# Patient Record
Sex: MALE | Race: OTHER | HISPANIC OR LATINO | URBAN - METROPOLITAN AREA
[De-identification: names, ages, dates, MRNs, and addresses within clinical notes are randomized per-mention and may not be internally consistent; named-entity substitution may affect disease eponyms.]

---

## 2018-07-02 ENCOUNTER — EMERGENCY (EMERGENCY)
Facility: HOSPITAL | Age: 63
LOS: 1 days | Discharge: HOME | End: 2018-07-02
Attending: EMERGENCY MEDICINE | Admitting: EMERGENCY MEDICINE
Payer: MEDICAID

## 2018-07-02 VITALS
TEMPERATURE: 98 F | OXYGEN SATURATION: 98 % | HEART RATE: 74 BPM | RESPIRATION RATE: 18 BRPM | DIASTOLIC BLOOD PRESSURE: 75 MMHG | SYSTOLIC BLOOD PRESSURE: 122 MMHG

## 2018-07-02 VITALS
RESPIRATION RATE: 18 BRPM | SYSTOLIC BLOOD PRESSURE: 110 MMHG | TEMPERATURE: 98 F | HEART RATE: 70 BPM | DIASTOLIC BLOOD PRESSURE: 62 MMHG | OXYGEN SATURATION: 97 %

## 2018-07-02 LAB
ALBUMIN SERPL ELPH-MCNC: 4.1 G/DL — SIGNIFICANT CHANGE UP (ref 3.3–5)
ALP SERPL-CCNC: 84 U/L — SIGNIFICANT CHANGE UP (ref 40–120)
ALT FLD-CCNC: 17 U/L — SIGNIFICANT CHANGE UP (ref 4–41)
APPEARANCE UR: CLEAR — SIGNIFICANT CHANGE UP
AST SERPL-CCNC: 14 U/L — SIGNIFICANT CHANGE UP (ref 4–40)
BACTERIA # UR AUTO: SIGNIFICANT CHANGE UP
BASOPHILS # BLD AUTO: 0.03 K/UL — SIGNIFICANT CHANGE UP (ref 0–0.2)
BASOPHILS NFR BLD AUTO: 0.5 % — SIGNIFICANT CHANGE UP (ref 0–2)
BILIRUB SERPL-MCNC: 0.7 MG/DL — SIGNIFICANT CHANGE UP (ref 0.2–1.2)
BILIRUB UR-MCNC: NEGATIVE — SIGNIFICANT CHANGE UP
BLOOD UR QL VISUAL: NEGATIVE — SIGNIFICANT CHANGE UP
BUN SERPL-MCNC: 13 MG/DL — SIGNIFICANT CHANGE UP (ref 7–23)
CALCIUM SERPL-MCNC: 8.9 MG/DL — SIGNIFICANT CHANGE UP (ref 8.4–10.5)
CHLORIDE SERPL-SCNC: 99 MMOL/L — SIGNIFICANT CHANGE UP (ref 98–107)
CO2 SERPL-SCNC: 27 MMOL/L — SIGNIFICANT CHANGE UP (ref 22–31)
COLOR SPEC: YELLOW — SIGNIFICANT CHANGE UP
CREAT SERPL-MCNC: 0.88 MG/DL — SIGNIFICANT CHANGE UP (ref 0.5–1.3)
EOSINOPHIL # BLD AUTO: 0.62 K/UL — HIGH (ref 0–0.5)
EOSINOPHIL NFR BLD AUTO: 10.1 % — HIGH (ref 0–6)
GLUCOSE SERPL-MCNC: 339 MG/DL — HIGH (ref 70–99)
GLUCOSE UR-MCNC: >1000 — SIGNIFICANT CHANGE UP
HCT VFR BLD CALC: 43.3 % — SIGNIFICANT CHANGE UP (ref 39–50)
HGB BLD-MCNC: 14.4 G/DL — SIGNIFICANT CHANGE UP (ref 13–17)
IMM GRANULOCYTES # BLD AUTO: 0.01 # — SIGNIFICANT CHANGE UP
IMM GRANULOCYTES NFR BLD AUTO: 0.2 % — SIGNIFICANT CHANGE UP (ref 0–1.5)
KETONES UR-MCNC: NEGATIVE — SIGNIFICANT CHANGE UP
LEUKOCYTE ESTERASE UR-ACNC: NEGATIVE — SIGNIFICANT CHANGE UP
LIDOCAIN IGE QN: 25 U/L — SIGNIFICANT CHANGE UP (ref 7–60)
LYMPHOCYTES # BLD AUTO: 2.39 K/UL — SIGNIFICANT CHANGE UP (ref 1–3.3)
LYMPHOCYTES # BLD AUTO: 39.1 % — SIGNIFICANT CHANGE UP (ref 13–44)
MCHC RBC-ENTMCNC: 30.2 PG — SIGNIFICANT CHANGE UP (ref 27–34)
MCHC RBC-ENTMCNC: 33.3 % — SIGNIFICANT CHANGE UP (ref 32–36)
MCV RBC AUTO: 90.8 FL — SIGNIFICANT CHANGE UP (ref 80–100)
MONOCYTES # BLD AUTO: 0.61 K/UL — SIGNIFICANT CHANGE UP (ref 0–0.9)
MONOCYTES NFR BLD AUTO: 10 % — SIGNIFICANT CHANGE UP (ref 2–14)
MUCOUS THREADS # UR AUTO: SIGNIFICANT CHANGE UP
NEUTROPHILS # BLD AUTO: 2.46 K/UL — SIGNIFICANT CHANGE UP (ref 1.8–7.4)
NEUTROPHILS NFR BLD AUTO: 40.1 % — LOW (ref 43–77)
NITRITE UR-MCNC: NEGATIVE — SIGNIFICANT CHANGE UP
NRBC # FLD: 0 — SIGNIFICANT CHANGE UP
PH UR: 7 — SIGNIFICANT CHANGE UP (ref 4.6–8)
PLATELET # BLD AUTO: 138 K/UL — LOW (ref 150–400)
PMV BLD: 10.9 FL — SIGNIFICANT CHANGE UP (ref 7–13)
POTASSIUM SERPL-MCNC: 4.2 MMOL/L — SIGNIFICANT CHANGE UP (ref 3.5–5.3)
POTASSIUM SERPL-SCNC: 4.2 MMOL/L — SIGNIFICANT CHANGE UP (ref 3.5–5.3)
PROT SERPL-MCNC: 6.9 G/DL — SIGNIFICANT CHANGE UP (ref 6–8.3)
PROT UR-MCNC: 10 MG/DL — SIGNIFICANT CHANGE UP
RBC # BLD: 4.77 M/UL — SIGNIFICANT CHANGE UP (ref 4.2–5.8)
RBC # FLD: 11.4 % — SIGNIFICANT CHANGE UP (ref 10.3–14.5)
RBC CASTS # UR COMP ASSIST: SIGNIFICANT CHANGE UP (ref 0–?)
REVIEW TO FOLLOW: YES — SIGNIFICANT CHANGE UP
SODIUM SERPL-SCNC: 137 MMOL/L — SIGNIFICANT CHANGE UP (ref 135–145)
SP GR SPEC: 1.03 — SIGNIFICANT CHANGE UP (ref 1–1.04)
SQUAMOUS # UR AUTO: SIGNIFICANT CHANGE UP
UROBILINOGEN FLD QL: NORMAL MG/DL — SIGNIFICANT CHANGE UP
WBC # BLD: 6.12 K/UL — SIGNIFICANT CHANGE UP (ref 3.8–10.5)
WBC # FLD AUTO: 6.12 K/UL — SIGNIFICANT CHANGE UP (ref 3.8–10.5)
WBC UR QL: SIGNIFICANT CHANGE UP (ref 0–?)

## 2018-07-02 PROCEDURE — 71046 X-RAY EXAM CHEST 2 VIEWS: CPT | Mod: 26

## 2018-07-02 PROCEDURE — 99284 EMERGENCY DEPT VISIT MOD MDM: CPT | Mod: 25

## 2018-07-02 PROCEDURE — 76705 ECHO EXAM OF ABDOMEN: CPT | Mod: 26

## 2018-07-02 RX ORDER — KETOROLAC TROMETHAMINE 30 MG/ML
15 SYRINGE (ML) INJECTION ONCE
Qty: 0 | Refills: 0 | Status: DISCONTINUED | OUTPATIENT
Start: 2018-07-02 | End: 2018-07-02

## 2018-07-02 RX ADMIN — Medication 15 MILLIGRAM(S): at 20:06

## 2018-07-02 NOTE — ED PROVIDER NOTE - OBJECTIVE STATEMENT
62y/o M with no pertinent PMHx, presents to the ED c/o R sided abdominal pain radiating to his back intermittently x1mo. his pain has worsened in the past couple of days. His pain is worse with movement, no alleviating factors, not worse with food. Denies nausea/vomiting/diarrhea, trauma, dysuria, hematuria, fevers/chills, recent abx use, any other complaints. NKDA.

## 2018-07-02 NOTE — ED PROVIDER NOTE - MEDICAL DECISION MAKING DETAILS
64y/o M with R sided abdominal pain radiating to his back. R/o cholecystitis vs. PNA. Plan: Labs, CXR, US, analgesia.

## 2018-07-02 NOTE — ED PROVIDER NOTE - MUSCULOSKELETAL MINIMAL EXAM
Tenderness over the R lower scapula border. No CVA tenderness. No deformity, neurovascularly intact to all extremities./TENDERNESS

## 2018-07-02 NOTE — ED PROVIDER NOTE - ABDOMINAL EXAM
Abdomen soft, non-tender, nondistended. Small reducible umbilical hernia./soft/HERNIATION/nondistended/nontender...

## 2018-07-02 NOTE — ED PROCEDURE NOTE - PROCEDURE ADDITIONAL DETAILS
Focused Ed ultrasound RUQ:  Indication:   Findings: no wall thickening, no wall edema, no pericholecystic fluid, negative sonographic Ivory's. no stones/sludge  anterior gallbladder wall: within normal limits  CBD: within normal limits    Impression: no cholelithiasis, no cholecystitis  [facility]  [id number]

## 2018-07-02 NOTE — ED PROVIDER NOTE - PROGRESS NOTE DETAILS
Attending Note (Yenni): pain resolved.  explained to patient need for follow up for elevated glucose, low platelets and high eosinophils.

## 2018-07-04 LAB
BACTERIA UR CULT: SIGNIFICANT CHANGE UP
SPECIMEN SOURCE: SIGNIFICANT CHANGE UP

## 2022-01-01 ENCOUNTER — APPOINTMENT (OUTPATIENT)
Dept: HEMATOLOGY ONCOLOGY | Facility: CLINIC | Age: 67
End: 2022-01-01

## 2022-01-01 ENCOUNTER — APPOINTMENT (OUTPATIENT)
Dept: CARDIOLOGY | Facility: CLINIC | Age: 67
End: 2022-01-01

## 2022-01-01 ENCOUNTER — NON-APPOINTMENT (OUTPATIENT)
Age: 67
End: 2022-01-01

## 2022-01-01 ENCOUNTER — RESULT REVIEW (OUTPATIENT)
Age: 67
End: 2022-01-01

## 2022-01-01 ENCOUNTER — RX RENEWAL (OUTPATIENT)
Age: 67
End: 2022-01-01

## 2022-01-01 ENCOUNTER — APPOINTMENT (OUTPATIENT)
Dept: RADIATION ONCOLOGY | Facility: CLINIC | Age: 67
End: 2022-01-01

## 2022-01-01 ENCOUNTER — OUTPATIENT (OUTPATIENT)
Dept: OUTPATIENT SERVICES | Facility: HOSPITAL | Age: 67
LOS: 1 days | Discharge: HOME | End: 2022-01-01

## 2022-01-01 ENCOUNTER — APPOINTMENT (OUTPATIENT)
Dept: NEUROLOGY | Facility: CLINIC | Age: 67
End: 2022-01-01

## 2022-01-01 ENCOUNTER — APPOINTMENT (OUTPATIENT)
Dept: MRI IMAGING | Facility: IMAGING CENTER | Age: 67
End: 2022-01-01

## 2022-01-01 ENCOUNTER — APPOINTMENT (OUTPATIENT)
Dept: NEUROLOGY | Facility: CLINIC | Age: 67
End: 2022-01-01
Payer: MEDICARE

## 2022-01-01 ENCOUNTER — APPOINTMENT (OUTPATIENT)
Dept: ENDOCRINOLOGY | Facility: CLINIC | Age: 67
End: 2022-01-01

## 2022-01-01 ENCOUNTER — OUTPATIENT (OUTPATIENT)
Dept: OUTPATIENT SERVICES | Facility: HOSPITAL | Age: 67
LOS: 1 days | End: 2022-01-01
Payer: COMMERCIAL

## 2022-01-01 ENCOUNTER — LABORATORY RESULT (OUTPATIENT)
Age: 67
End: 2022-01-01

## 2022-01-01 ENCOUNTER — APPOINTMENT (OUTPATIENT)
Dept: OTOLARYNGOLOGY | Facility: CLINIC | Age: 67
End: 2022-01-01
Payer: MEDICARE

## 2022-01-01 ENCOUNTER — APPOINTMENT (OUTPATIENT)
Dept: NEUROSURGERY | Facility: CLINIC | Age: 67
End: 2022-01-01

## 2022-01-01 ENCOUNTER — OUTPATIENT (OUTPATIENT)
Dept: OUTPATIENT SERVICES | Facility: HOSPITAL | Age: 67
LOS: 1 days | End: 2022-01-01
Payer: MEDICARE

## 2022-01-01 ENCOUNTER — APPOINTMENT (OUTPATIENT)
Dept: OPHTHALMOLOGY | Facility: CLINIC | Age: 67
End: 2022-01-01

## 2022-01-01 VITALS
SYSTOLIC BLOOD PRESSURE: 100 MMHG | RESPIRATION RATE: 16 BRPM | HEART RATE: 74 BPM | OXYGEN SATURATION: 98 % | WEIGHT: 148 LBS | BODY MASS INDEX: 23.18 KG/M2 | DIASTOLIC BLOOD PRESSURE: 62 MMHG | TEMPERATURE: 209.48 F

## 2022-01-01 VITALS
OXYGEN SATURATION: 98 % | WEIGHT: 148 LBS | HEART RATE: 78 BPM | SYSTOLIC BLOOD PRESSURE: 118 MMHG | HEIGHT: 67 IN | RESPIRATION RATE: 16 BRPM | DIASTOLIC BLOOD PRESSURE: 66 MMHG | BODY MASS INDEX: 23.23 KG/M2

## 2022-01-01 VITALS
BODY MASS INDEX: 20.56 KG/M2 | OXYGEN SATURATION: 98 % | WEIGHT: 131 LBS | TEMPERATURE: 208.4 F | DIASTOLIC BLOOD PRESSURE: 60 MMHG | SYSTOLIC BLOOD PRESSURE: 100 MMHG | HEART RATE: 92 BPM | HEIGHT: 67 IN | RESPIRATION RATE: 16 BRPM

## 2022-01-01 VITALS
WEIGHT: 140 LBS | DIASTOLIC BLOOD PRESSURE: 70 MMHG | TEMPERATURE: 208.76 F | HEART RATE: 78 BPM | OXYGEN SATURATION: 96 % | HEIGHT: 67 IN | BODY MASS INDEX: 21.97 KG/M2 | SYSTOLIC BLOOD PRESSURE: 100 MMHG

## 2022-01-01 VITALS
OXYGEN SATURATION: 98 % | BODY MASS INDEX: 23.7 KG/M2 | DIASTOLIC BLOOD PRESSURE: 64 MMHG | SYSTOLIC BLOOD PRESSURE: 108 MMHG | RESPIRATION RATE: 16 BRPM | HEART RATE: 64 BPM | HEIGHT: 67 IN | WEIGHT: 151 LBS

## 2022-01-01 DIAGNOSIS — Z98.890 OTHER SPECIFIED POSTPROCEDURAL STATES: Chronic | ICD-10-CM

## 2022-01-01 DIAGNOSIS — C71.9 MALIGNANT NEOPLASM OF BRAIN, UNSPECIFIED: ICD-10-CM

## 2022-01-01 DIAGNOSIS — D50.9 IRON DEFICIENCY ANEMIA, UNSPECIFIED: ICD-10-CM

## 2022-01-01 LAB
ALBUMIN SERPL ELPH-MCNC: 4.2 G/DL
ALBUMIN SERPL ELPH-MCNC: 4.5 G/DL
ALP BLD-CCNC: 62 U/L
ALP BLD-CCNC: 76 U/L
ALP BLD-CCNC: 80 U/L
ALP BLD-CCNC: 82 U/L
ALT SERPL-CCNC: 14 U/L
ALT SERPL-CCNC: 17 U/L
ANION GAP SERPL CALC-SCNC: 12 MMOL/L
ANION GAP SERPL CALC-SCNC: 13 MMOL/L
ANION GAP SERPL CALC-SCNC: 13 MMOL/L
ANION GAP SERPL CALC-SCNC: 14 MMOL/L
AST SERPL-CCNC: 14 U/L
AST SERPL-CCNC: 15 U/L
AST SERPL-CCNC: 16 U/L
AST SERPL-CCNC: 22 U/L
BASOPHILS # BLD AUTO: 0 K/UL — SIGNIFICANT CHANGE UP (ref 0–0.2)
BASOPHILS # BLD AUTO: 0.02 K/UL — SIGNIFICANT CHANGE UP (ref 0–0.2)
BASOPHILS # BLD AUTO: 0.02 K/UL — SIGNIFICANT CHANGE UP (ref 0–0.2)
BASOPHILS # BLD AUTO: 0.03 K/UL — SIGNIFICANT CHANGE UP (ref 0–0.2)
BASOPHILS # BLD AUTO: 0.04 K/UL — SIGNIFICANT CHANGE UP (ref 0–0.2)
BASOPHILS # BLD AUTO: 0.05 K/UL — SIGNIFICANT CHANGE UP (ref 0–0.2)
BASOPHILS # BLD AUTO: 0.06 K/UL — SIGNIFICANT CHANGE UP (ref 0–0.2)
BASOPHILS # BLD AUTO: 0.06 K/UL — SIGNIFICANT CHANGE UP (ref 0–0.2)
BASOPHILS # BLD AUTO: 0.07 K/UL — SIGNIFICANT CHANGE UP (ref 0–0.2)
BASOPHILS NFR BLD AUTO: 0 % — SIGNIFICANT CHANGE UP (ref 0–2)
BASOPHILS NFR BLD AUTO: 0.4 % — SIGNIFICANT CHANGE UP (ref 0–2)
BASOPHILS NFR BLD AUTO: 0.5 % — SIGNIFICANT CHANGE UP (ref 0–2)
BASOPHILS NFR BLD AUTO: 0.6 % — SIGNIFICANT CHANGE UP (ref 0–2)
BASOPHILS NFR BLD AUTO: 0.6 % — SIGNIFICANT CHANGE UP (ref 0–2)
BASOPHILS NFR BLD AUTO: 0.7 % — SIGNIFICANT CHANGE UP (ref 0–2)
BASOPHILS NFR BLD AUTO: 0.7 % — SIGNIFICANT CHANGE UP (ref 0–2)
BASOPHILS NFR BLD AUTO: 0.8 % — SIGNIFICANT CHANGE UP (ref 0–2)
BASOPHILS NFR BLD AUTO: 0.8 % — SIGNIFICANT CHANGE UP (ref 0–2)
BASOPHILS NFR BLD AUTO: 0.9 % — SIGNIFICANT CHANGE UP (ref 0–2)
BASOPHILS NFR BLD AUTO: 1 % — SIGNIFICANT CHANGE UP (ref 0–2)
BASOPHILS NFR BLD AUTO: 1.2 % — SIGNIFICANT CHANGE UP (ref 0–2)
BILIRUB SERPL-MCNC: 0.4 MG/DL
BILIRUB SERPL-MCNC: 0.5 MG/DL
BUN SERPL-MCNC: 10 MG/DL
BUN SERPL-MCNC: 13 MG/DL
BUN SERPL-MCNC: 13 MG/DL
BUN SERPL-MCNC: 9 MG/DL
CALCIUM SERPL-MCNC: 8.9 MG/DL
CALCIUM SERPL-MCNC: 9.3 MG/DL
CALCIUM SERPL-MCNC: 9.7 MG/DL
CALCIUM SERPL-MCNC: 9.7 MG/DL
CHLORIDE SERPL-SCNC: 101 MMOL/L
CHLORIDE SERPL-SCNC: 101 MMOL/L
CHLORIDE SERPL-SCNC: 98 MMOL/L
CHLORIDE SERPL-SCNC: 99 MMOL/L
CO2 SERPL-SCNC: 25 MMOL/L
CO2 SERPL-SCNC: 25 MMOL/L
CO2 SERPL-SCNC: 26 MMOL/L
CO2 SERPL-SCNC: 27 MMOL/L
CREAT SERPL-MCNC: 0.69 MG/DL
CREAT SERPL-MCNC: 0.71 MG/DL
CREAT SERPL-MCNC: 0.72 MG/DL
CREAT SERPL-MCNC: 0.89 MG/DL
EGFR: 101 ML/MIN/1.73M2
EGFR: 101 ML/MIN/1.73M2
EGFR: 102 ML/MIN/1.73M2
EGFR: 95 ML/MIN/1.73M2
EOSINOPHIL # BLD AUTO: 0.66 K/UL — HIGH (ref 0–0.5)
EOSINOPHIL # BLD AUTO: 0.66 K/UL — HIGH (ref 0–0.5)
EOSINOPHIL # BLD AUTO: 0.69 K/UL — HIGH (ref 0–0.5)
EOSINOPHIL # BLD AUTO: 0.81 K/UL — HIGH (ref 0–0.5)
EOSINOPHIL # BLD AUTO: 0.85 K/UL — HIGH (ref 0–0.5)
EOSINOPHIL # BLD AUTO: 0.9 K/UL — HIGH (ref 0–0.5)
EOSINOPHIL # BLD AUTO: 0.93 K/UL — HIGH (ref 0–0.5)
EOSINOPHIL # BLD AUTO: 0.99 K/UL — HIGH (ref 0–0.5)
EOSINOPHIL # BLD AUTO: 1 K/UL — HIGH (ref 0–0.5)
EOSINOPHIL # BLD AUTO: 1.01 K/UL — HIGH (ref 0–0.5)
EOSINOPHIL # BLD AUTO: 1.02 K/UL — HIGH (ref 0–0.5)
EOSINOPHIL # BLD AUTO: 1.19 K/UL — HIGH (ref 0–0.5)
EOSINOPHIL # BLD AUTO: 1.39 K/UL — HIGH (ref 0–0.5)
EOSINOPHIL # BLD AUTO: 1.51 K/UL — HIGH (ref 0–0.5)
EOSINOPHIL # BLD AUTO: 1.55 K/UL — HIGH (ref 0–0.5)
EOSINOPHIL # BLD AUTO: 1.7 K/UL — HIGH (ref 0–0.5)
EOSINOPHIL # BLD AUTO: 1.79 K/UL — HIGH (ref 0–0.5)
EOSINOPHIL # BLD AUTO: 1.88 K/UL — HIGH (ref 0–0.5)
EOSINOPHIL # BLD AUTO: 2.11 K/UL — HIGH (ref 0–0.5)
EOSINOPHIL NFR BLD AUTO: 14.7 % — HIGH (ref 0–6)
EOSINOPHIL NFR BLD AUTO: 15.6 % — HIGH (ref 0–6)
EOSINOPHIL NFR BLD AUTO: 16.8 % — HIGH (ref 0–6)
EOSINOPHIL NFR BLD AUTO: 17.3 % — HIGH (ref 0–6)
EOSINOPHIL NFR BLD AUTO: 17.9 % — HIGH (ref 0–6)
EOSINOPHIL NFR BLD AUTO: 18.7 % — HIGH (ref 0–6)
EOSINOPHIL NFR BLD AUTO: 19.5 % — HIGH (ref 0–6)
EOSINOPHIL NFR BLD AUTO: 19.6 % — HIGH (ref 0–6)
EOSINOPHIL NFR BLD AUTO: 21.7 % — HIGH (ref 0–6)
EOSINOPHIL NFR BLD AUTO: 22.9 % — HIGH (ref 0–6)
EOSINOPHIL NFR BLD AUTO: 23 % — HIGH (ref 0–6)
EOSINOPHIL NFR BLD AUTO: 24 % — HIGH (ref 0–6)
EOSINOPHIL NFR BLD AUTO: 25 % — HIGH (ref 0–6)
EOSINOPHIL NFR BLD AUTO: 26.2 % — HIGH (ref 0–6)
EOSINOPHIL NFR BLD AUTO: 27.1 % — HIGH (ref 0–6)
EOSINOPHIL NFR BLD AUTO: 29.1 % — HIGH (ref 0–6)
EOSINOPHIL NFR BLD AUTO: 33.7 % — HIGH (ref 0–6)
EOSINOPHIL NFR BLD AUTO: 34.4 % — HIGH (ref 0–6)
EOSINOPHIL NFR BLD AUTO: 34.8 % — HIGH (ref 0–6)
GLUCOSE SERPL-MCNC: 122 MG/DL
GLUCOSE SERPL-MCNC: 130 MG/DL
GLUCOSE SERPL-MCNC: 130 MG/DL
GLUCOSE SERPL-MCNC: 281 MG/DL
HCT VFR BLD CALC: 32.5 % — LOW (ref 39–50)
HCT VFR BLD CALC: 32.7 % — LOW (ref 39–50)
HCT VFR BLD CALC: 33.8 % — LOW (ref 39–50)
HCT VFR BLD CALC: 34.1 % — LOW (ref 39–50)
HCT VFR BLD CALC: 34.5 % — LOW (ref 39–50)
HCT VFR BLD CALC: 34.7 % — LOW (ref 39–50)
HCT VFR BLD CALC: 34.8 % — LOW (ref 39–50)
HCT VFR BLD CALC: 34.9 % — LOW (ref 39–50)
HCT VFR BLD CALC: 35 % — LOW (ref 39–50)
HCT VFR BLD CALC: 35.6 % — LOW (ref 39–50)
HCT VFR BLD CALC: 36 % — LOW (ref 39–50)
HCT VFR BLD CALC: 36.4 % — LOW (ref 39–50)
HCT VFR BLD CALC: 36.5 % — LOW (ref 39–50)
HCT VFR BLD CALC: 36.6 % — LOW (ref 39–50)
HCT VFR BLD CALC: 37.1 % — LOW (ref 39–50)
HCT VFR BLD CALC: 37.5 % — LOW (ref 39–50)
HCT VFR BLD CALC: 39 % — SIGNIFICANT CHANGE UP (ref 39–50)
HGB BLD-MCNC: 11.1 G/DL — LOW (ref 13–17)
HGB BLD-MCNC: 11.2 G/DL — LOW (ref 13–17)
HGB BLD-MCNC: 11.3 G/DL — LOW (ref 13–17)
HGB BLD-MCNC: 11.5 G/DL — LOW (ref 13–17)
HGB BLD-MCNC: 11.6 G/DL — LOW (ref 13–17)
HGB BLD-MCNC: 11.7 G/DL — LOW (ref 13–17)
HGB BLD-MCNC: 11.9 G/DL — LOW (ref 13–17)
HGB BLD-MCNC: 12 G/DL — LOW (ref 13–17)
HGB BLD-MCNC: 12 G/DL — LOW (ref 13–17)
HGB BLD-MCNC: 12.1 G/DL — LOW (ref 13–17)
HGB BLD-MCNC: 12.1 G/DL — LOW (ref 13–17)
HGB BLD-MCNC: 12.3 G/DL — LOW (ref 13–17)
HGB BLD-MCNC: 12.5 G/DL — LOW (ref 13–17)
HGB BLD-MCNC: 13.2 G/DL — SIGNIFICANT CHANGE UP (ref 13–17)
IMM GRANULOCYTES NFR BLD AUTO: 0 % — SIGNIFICANT CHANGE UP (ref 0–0.9)
IMM GRANULOCYTES NFR BLD AUTO: 0.2 % — SIGNIFICANT CHANGE UP (ref 0–0.9)
IMM GRANULOCYTES NFR BLD AUTO: 0.3 % — SIGNIFICANT CHANGE UP (ref 0–0.9)
IMM GRANULOCYTES NFR BLD AUTO: 0.3 % — SIGNIFICANT CHANGE UP (ref 0–1.5)
IMM GRANULOCYTES NFR BLD AUTO: 0.4 % — SIGNIFICANT CHANGE UP (ref 0–0.9)
IMM GRANULOCYTES NFR BLD AUTO: 0.5 % — SIGNIFICANT CHANGE UP (ref 0–0.9)
IMM GRANULOCYTES NFR BLD AUTO: 0.6 % — SIGNIFICANT CHANGE UP (ref 0–0.9)
IMM GRANULOCYTES NFR BLD AUTO: 0.8 % — SIGNIFICANT CHANGE UP (ref 0–1.5)
IMM GRANULOCYTES NFR BLD AUTO: 1.2 % — HIGH (ref 0–0.9)
LYMPHOCYTES # BLD AUTO: 0.81 K/UL — LOW (ref 1–3.3)
LYMPHOCYTES # BLD AUTO: 0.86 K/UL — LOW (ref 1–3.3)
LYMPHOCYTES # BLD AUTO: 0.88 K/UL — LOW (ref 1–3.3)
LYMPHOCYTES # BLD AUTO: 0.9 K/UL — LOW (ref 1–3.3)
LYMPHOCYTES # BLD AUTO: 0.94 K/UL — LOW (ref 1–3.3)
LYMPHOCYTES # BLD AUTO: 0.95 K/UL — LOW (ref 1–3.3)
LYMPHOCYTES # BLD AUTO: 0.96 K/UL — LOW (ref 1–3.3)
LYMPHOCYTES # BLD AUTO: 0.97 K/UL — LOW (ref 1–3.3)
LYMPHOCYTES # BLD AUTO: 0.97 K/UL — LOW (ref 1–3.3)
LYMPHOCYTES # BLD AUTO: 0.98 K/UL — LOW (ref 1–3.3)
LYMPHOCYTES # BLD AUTO: 1 K/UL — SIGNIFICANT CHANGE UP (ref 1–3.3)
LYMPHOCYTES # BLD AUTO: 1.08 K/UL — SIGNIFICANT CHANGE UP (ref 1–3.3)
LYMPHOCYTES # BLD AUTO: 1.11 K/UL — SIGNIFICANT CHANGE UP (ref 1–3.3)
LYMPHOCYTES # BLD AUTO: 1.14 K/UL — SIGNIFICANT CHANGE UP (ref 1–3.3)
LYMPHOCYTES # BLD AUTO: 1.2 K/UL — SIGNIFICANT CHANGE UP (ref 1–3.3)
LYMPHOCYTES # BLD AUTO: 1.2 K/UL — SIGNIFICANT CHANGE UP (ref 1–3.3)
LYMPHOCYTES # BLD AUTO: 1.27 K/UL — SIGNIFICANT CHANGE UP (ref 1–3.3)
LYMPHOCYTES # BLD AUTO: 1.3 K/UL — SIGNIFICANT CHANGE UP (ref 1–3.3)
LYMPHOCYTES # BLD AUTO: 1.31 K/UL — SIGNIFICANT CHANGE UP (ref 1–3.3)
LYMPHOCYTES # BLD AUTO: 15.7 % — SIGNIFICANT CHANGE UP (ref 13–44)
LYMPHOCYTES # BLD AUTO: 16 % — SIGNIFICANT CHANGE UP (ref 13–44)
LYMPHOCYTES # BLD AUTO: 17.5 % — SIGNIFICANT CHANGE UP (ref 13–44)
LYMPHOCYTES # BLD AUTO: 17.6 % — SIGNIFICANT CHANGE UP (ref 13–44)
LYMPHOCYTES # BLD AUTO: 18.4 % — SIGNIFICANT CHANGE UP (ref 13–44)
LYMPHOCYTES # BLD AUTO: 18.7 % — SIGNIFICANT CHANGE UP (ref 13–44)
LYMPHOCYTES # BLD AUTO: 19.6 % — SIGNIFICANT CHANGE UP (ref 13–44)
LYMPHOCYTES # BLD AUTO: 20 % — SIGNIFICANT CHANGE UP (ref 13–44)
LYMPHOCYTES # BLD AUTO: 20.4 % — SIGNIFICANT CHANGE UP (ref 13–44)
LYMPHOCYTES # BLD AUTO: 20.7 % — SIGNIFICANT CHANGE UP (ref 13–44)
LYMPHOCYTES # BLD AUTO: 21 % — SIGNIFICANT CHANGE UP (ref 13–44)
LYMPHOCYTES # BLD AUTO: 22 % — SIGNIFICANT CHANGE UP (ref 13–44)
LYMPHOCYTES # BLD AUTO: 22.7 % — SIGNIFICANT CHANGE UP (ref 13–44)
LYMPHOCYTES # BLD AUTO: 23 % — SIGNIFICANT CHANGE UP (ref 13–44)
LYMPHOCYTES # BLD AUTO: 23.3 % — SIGNIFICANT CHANGE UP (ref 13–44)
LYMPHOCYTES # BLD AUTO: 24.1 % — SIGNIFICANT CHANGE UP (ref 13–44)
LYMPHOCYTES # BLD AUTO: 24.7 % — SIGNIFICANT CHANGE UP (ref 13–44)
LYMPHOCYTES # BLD AUTO: 26.7 % — SIGNIFICANT CHANGE UP (ref 13–44)
LYMPHOCYTES # BLD AUTO: 28 % — SIGNIFICANT CHANGE UP (ref 13–44)
MCHC RBC-ENTMCNC: 32.1 G/DL — SIGNIFICANT CHANGE UP (ref 32–36)
MCHC RBC-ENTMCNC: 32.1 G/DL — SIGNIFICANT CHANGE UP (ref 32–36)
MCHC RBC-ENTMCNC: 32.2 PG — SIGNIFICANT CHANGE UP (ref 27–34)
MCHC RBC-ENTMCNC: 32.3 PG — SIGNIFICANT CHANGE UP (ref 27–34)
MCHC RBC-ENTMCNC: 32.8 PG — SIGNIFICANT CHANGE UP (ref 27–34)
MCHC RBC-ENTMCNC: 32.9 G/DL — SIGNIFICANT CHANGE UP (ref 32–36)
MCHC RBC-ENTMCNC: 32.9 G/DL — SIGNIFICANT CHANGE UP (ref 32–36)
MCHC RBC-ENTMCNC: 32.9 PG — SIGNIFICANT CHANGE UP (ref 27–34)
MCHC RBC-ENTMCNC: 33.1 G/DL — SIGNIFICANT CHANGE UP (ref 32–36)
MCHC RBC-ENTMCNC: 33.2 G/DL — SIGNIFICANT CHANGE UP (ref 32–36)
MCHC RBC-ENTMCNC: 33.2 PG — SIGNIFICANT CHANGE UP (ref 27–34)
MCHC RBC-ENTMCNC: 33.3 G/DL — SIGNIFICANT CHANGE UP (ref 32–36)
MCHC RBC-ENTMCNC: 33.3 PG — SIGNIFICANT CHANGE UP (ref 27–34)
MCHC RBC-ENTMCNC: 33.4 G/DL — SIGNIFICANT CHANGE UP (ref 32–36)
MCHC RBC-ENTMCNC: 33.4 G/DL — SIGNIFICANT CHANGE UP (ref 32–36)
MCHC RBC-ENTMCNC: 33.4 PG — SIGNIFICANT CHANGE UP (ref 27–34)
MCHC RBC-ENTMCNC: 33.5 G/DL — SIGNIFICANT CHANGE UP (ref 32–36)
MCHC RBC-ENTMCNC: 33.5 PG — SIGNIFICANT CHANGE UP (ref 27–34)
MCHC RBC-ENTMCNC: 33.7 G/DL — SIGNIFICANT CHANGE UP (ref 32–36)
MCHC RBC-ENTMCNC: 33.7 G/DL — SIGNIFICANT CHANGE UP (ref 32–36)
MCHC RBC-ENTMCNC: 33.8 G/DL — SIGNIFICANT CHANGE UP (ref 32–36)
MCHC RBC-ENTMCNC: 33.8 PG — SIGNIFICANT CHANGE UP (ref 27–34)
MCHC RBC-ENTMCNC: 33.8 PG — SIGNIFICANT CHANGE UP (ref 27–34)
MCHC RBC-ENTMCNC: 33.9 G/DL — SIGNIFICANT CHANGE UP (ref 32–36)
MCHC RBC-ENTMCNC: 33.9 PG — SIGNIFICANT CHANGE UP (ref 27–34)
MCHC RBC-ENTMCNC: 34 PG — SIGNIFICANT CHANGE UP (ref 27–34)
MCHC RBC-ENTMCNC: 34 PG — SIGNIFICANT CHANGE UP (ref 27–34)
MCHC RBC-ENTMCNC: 34.1 PG — HIGH (ref 27–34)
MCHC RBC-ENTMCNC: 34.2 G/DL — SIGNIFICANT CHANGE UP (ref 32–36)
MCHC RBC-ENTMCNC: 34.2 PG — HIGH (ref 27–34)
MCHC RBC-ENTMCNC: 34.2 PG — HIGH (ref 27–34)
MCHC RBC-ENTMCNC: 34.3 G/DL — SIGNIFICANT CHANGE UP (ref 32–36)
MCHC RBC-ENTMCNC: 34.3 PG — HIGH (ref 27–34)
MCHC RBC-ENTMCNC: 34.9 G/DL — SIGNIFICANT CHANGE UP (ref 32–36)
MCV RBC AUTO: 100 FL — SIGNIFICANT CHANGE UP (ref 80–100)
MCV RBC AUTO: 100 FL — SIGNIFICANT CHANGE UP (ref 80–100)
MCV RBC AUTO: 100.3 FL — HIGH (ref 80–100)
MCV RBC AUTO: 100.3 FL — HIGH (ref 80–100)
MCV RBC AUTO: 100.6 FL — HIGH (ref 80–100)
MCV RBC AUTO: 100.9 FL — HIGH (ref 80–100)
MCV RBC AUTO: 101.1 FL — HIGH (ref 80–100)
MCV RBC AUTO: 101.5 FL — HIGH (ref 80–100)
MCV RBC AUTO: 101.7 FL — HIGH (ref 80–100)
MCV RBC AUTO: 101.8 FL — HIGH (ref 80–100)
MCV RBC AUTO: 102 FL — HIGH (ref 80–100)
MCV RBC AUTO: 102.2 FL — HIGH (ref 80–100)
MCV RBC AUTO: 104.3 FL — HIGH (ref 80–100)
MCV RBC AUTO: 96.8 FL — SIGNIFICANT CHANGE UP (ref 80–100)
MCV RBC AUTO: 96.9 FL — SIGNIFICANT CHANGE UP (ref 80–100)
MCV RBC AUTO: 98.4 FL — SIGNIFICANT CHANGE UP (ref 80–100)
MCV RBC AUTO: 98.6 FL — SIGNIFICANT CHANGE UP (ref 80–100)
MCV RBC AUTO: 98.8 FL — SIGNIFICANT CHANGE UP (ref 80–100)
MCV RBC AUTO: 99.7 FL — SIGNIFICANT CHANGE UP (ref 80–100)
MONOCYTES # BLD AUTO: 0.15 K/UL — SIGNIFICANT CHANGE UP (ref 0–0.9)
MONOCYTES # BLD AUTO: 0.21 K/UL — SIGNIFICANT CHANGE UP (ref 0–0.9)
MONOCYTES # BLD AUTO: 0.27 K/UL — SIGNIFICANT CHANGE UP (ref 0–0.9)
MONOCYTES # BLD AUTO: 0.28 K/UL — SIGNIFICANT CHANGE UP (ref 0–0.9)
MONOCYTES # BLD AUTO: 0.29 K/UL — SIGNIFICANT CHANGE UP (ref 0–0.9)
MONOCYTES # BLD AUTO: 0.3 K/UL — SIGNIFICANT CHANGE UP (ref 0–0.9)
MONOCYTES # BLD AUTO: 0.3 K/UL — SIGNIFICANT CHANGE UP (ref 0–0.9)
MONOCYTES # BLD AUTO: 0.36 K/UL — SIGNIFICANT CHANGE UP (ref 0–0.9)
MONOCYTES # BLD AUTO: 0.36 K/UL — SIGNIFICANT CHANGE UP (ref 0–0.9)
MONOCYTES # BLD AUTO: 0.37 K/UL — SIGNIFICANT CHANGE UP (ref 0–0.9)
MONOCYTES # BLD AUTO: 0.38 K/UL — SIGNIFICANT CHANGE UP (ref 0–0.9)
MONOCYTES # BLD AUTO: 0.38 K/UL — SIGNIFICANT CHANGE UP (ref 0–0.9)
MONOCYTES # BLD AUTO: 0.39 K/UL — SIGNIFICANT CHANGE UP (ref 0–0.9)
MONOCYTES # BLD AUTO: 0.41 K/UL — SIGNIFICANT CHANGE UP (ref 0–0.9)
MONOCYTES # BLD AUTO: 0.47 K/UL — SIGNIFICANT CHANGE UP (ref 0–0.9)
MONOCYTES # BLD AUTO: 0.47 K/UL — SIGNIFICANT CHANGE UP (ref 0–0.9)
MONOCYTES # BLD AUTO: 0.51 K/UL — SIGNIFICANT CHANGE UP (ref 0–0.9)
MONOCYTES # BLD AUTO: 0.53 K/UL — SIGNIFICANT CHANGE UP (ref 0–0.9)
MONOCYTES # BLD AUTO: 0.53 K/UL — SIGNIFICANT CHANGE UP (ref 0–0.9)
MONOCYTES NFR BLD AUTO: 10.5 % — SIGNIFICANT CHANGE UP (ref 2–14)
MONOCYTES NFR BLD AUTO: 11.6 % — SIGNIFICANT CHANGE UP (ref 2–14)
MONOCYTES NFR BLD AUTO: 2 % — SIGNIFICANT CHANGE UP (ref 2–14)
MONOCYTES NFR BLD AUTO: 5 % — SIGNIFICANT CHANGE UP (ref 2–14)
MONOCYTES NFR BLD AUTO: 5 % — SIGNIFICANT CHANGE UP (ref 2–14)
MONOCYTES NFR BLD AUTO: 6.4 % — SIGNIFICANT CHANGE UP (ref 2–14)
MONOCYTES NFR BLD AUTO: 6.6 % — SIGNIFICANT CHANGE UP (ref 2–14)
MONOCYTES NFR BLD AUTO: 6.8 % — SIGNIFICANT CHANGE UP (ref 2–14)
MONOCYTES NFR BLD AUTO: 6.8 % — SIGNIFICANT CHANGE UP (ref 2–14)
MONOCYTES NFR BLD AUTO: 6.9 % — SIGNIFICANT CHANGE UP (ref 2–14)
MONOCYTES NFR BLD AUTO: 7.3 % — SIGNIFICANT CHANGE UP (ref 2–14)
MONOCYTES NFR BLD AUTO: 7.5 % — SIGNIFICANT CHANGE UP (ref 2–14)
MONOCYTES NFR BLD AUTO: 7.8 % — SIGNIFICANT CHANGE UP (ref 2–14)
MONOCYTES NFR BLD AUTO: 7.9 % — SIGNIFICANT CHANGE UP (ref 2–14)
MONOCYTES NFR BLD AUTO: 8.3 % — SIGNIFICANT CHANGE UP (ref 2–14)
MONOCYTES NFR BLD AUTO: 8.5 % — SIGNIFICANT CHANGE UP (ref 2–14)
MONOCYTES NFR BLD AUTO: 8.6 % — SIGNIFICANT CHANGE UP (ref 2–14)
MONOCYTES NFR BLD AUTO: 8.7 % — SIGNIFICANT CHANGE UP (ref 2–14)
MONOCYTES NFR BLD AUTO: 9.2 % — SIGNIFICANT CHANGE UP (ref 2–14)
NEUTROPHILS # BLD AUTO: 1.61 K/UL — LOW (ref 1.8–7.4)
NEUTROPHILS # BLD AUTO: 1.63 K/UL — LOW (ref 1.8–7.4)
NEUTROPHILS # BLD AUTO: 1.85 K/UL — SIGNIFICANT CHANGE UP (ref 1.8–7.4)
NEUTROPHILS # BLD AUTO: 1.89 K/UL — SIGNIFICANT CHANGE UP (ref 1.8–7.4)
NEUTROPHILS # BLD AUTO: 1.97 K/UL — SIGNIFICANT CHANGE UP (ref 1.8–7.4)
NEUTROPHILS # BLD AUTO: 2.1 K/UL — SIGNIFICANT CHANGE UP (ref 1.8–7.4)
NEUTROPHILS # BLD AUTO: 2.25 K/UL — SIGNIFICANT CHANGE UP (ref 1.8–7.4)
NEUTROPHILS # BLD AUTO: 2.29 K/UL — SIGNIFICANT CHANGE UP (ref 1.8–7.4)
NEUTROPHILS # BLD AUTO: 2.31 K/UL — SIGNIFICANT CHANGE UP (ref 1.8–7.4)
NEUTROPHILS # BLD AUTO: 2.32 K/UL — SIGNIFICANT CHANGE UP (ref 1.8–7.4)
NEUTROPHILS # BLD AUTO: 2.32 K/UL — SIGNIFICANT CHANGE UP (ref 1.8–7.4)
NEUTROPHILS # BLD AUTO: 2.35 K/UL — SIGNIFICANT CHANGE UP (ref 1.8–7.4)
NEUTROPHILS # BLD AUTO: 2.4 K/UL — SIGNIFICANT CHANGE UP (ref 1.8–7.4)
NEUTROPHILS # BLD AUTO: 2.41 K/UL — SIGNIFICANT CHANGE UP (ref 1.8–7.4)
NEUTROPHILS # BLD AUTO: 2.43 K/UL — SIGNIFICANT CHANGE UP (ref 1.8–7.4)
NEUTROPHILS # BLD AUTO: 2.59 K/UL — SIGNIFICANT CHANGE UP (ref 1.8–7.4)
NEUTROPHILS # BLD AUTO: 2.89 K/UL — SIGNIFICANT CHANGE UP (ref 1.8–7.4)
NEUTROPHILS # BLD AUTO: 3.46 K/UL — SIGNIFICANT CHANGE UP (ref 1.8–7.4)
NEUTROPHILS # BLD AUTO: 4.26 K/UL — SIGNIFICANT CHANGE UP (ref 1.8–7.4)
NEUTROPHILS NFR BLD AUTO: 32.9 % — LOW (ref 43–77)
NEUTROPHILS NFR BLD AUTO: 36.9 % — LOW (ref 43–77)
NEUTROPHILS NFR BLD AUTO: 41.8 % — LOW (ref 43–77)
NEUTROPHILS NFR BLD AUTO: 42.2 % — LOW (ref 43–77)
NEUTROPHILS NFR BLD AUTO: 42.7 % — LOW (ref 43–77)
NEUTROPHILS NFR BLD AUTO: 43.9 % — SIGNIFICANT CHANGE UP (ref 43–77)
NEUTROPHILS NFR BLD AUTO: 44 % — SIGNIFICANT CHANGE UP (ref 43–77)
NEUTROPHILS NFR BLD AUTO: 44.1 % — SIGNIFICANT CHANGE UP (ref 43–77)
NEUTROPHILS NFR BLD AUTO: 46.7 % — SIGNIFICANT CHANGE UP (ref 43–77)
NEUTROPHILS NFR BLD AUTO: 48 % — SIGNIFICANT CHANGE UP (ref 43–77)
NEUTROPHILS NFR BLD AUTO: 50.3 % — SIGNIFICANT CHANGE UP (ref 43–77)
NEUTROPHILS NFR BLD AUTO: 50.7 % — SIGNIFICANT CHANGE UP (ref 43–77)
NEUTROPHILS NFR BLD AUTO: 50.9 % — SIGNIFICANT CHANGE UP (ref 43–77)
NEUTROPHILS NFR BLD AUTO: 51.1 % — SIGNIFICANT CHANGE UP (ref 43–77)
NEUTROPHILS NFR BLD AUTO: 51.3 % — SIGNIFICANT CHANGE UP (ref 43–77)
NEUTROPHILS NFR BLD AUTO: 51.8 % — SIGNIFICANT CHANGE UP (ref 43–77)
NEUTROPHILS NFR BLD AUTO: 54.6 % — SIGNIFICANT CHANGE UP (ref 43–77)
NEUTROPHILS NFR BLD AUTO: 54.7 % — SIGNIFICANT CHANGE UP (ref 43–77)
NEUTROPHILS NFR BLD AUTO: 57 % — SIGNIFICANT CHANGE UP (ref 43–77)
NRBC # BLD: 0 /100 WBCS — SIGNIFICANT CHANGE UP (ref 0–0)
NRBC # BLD: 0 /100 — SIGNIFICANT CHANGE UP (ref 0–0)
NRBC # BLD: SIGNIFICANT CHANGE UP /100 WBCS (ref 0–0)
PLAT MORPH BLD: NORMAL — SIGNIFICANT CHANGE UP
PLATELET # BLD AUTO: 100 K/UL — LOW (ref 150–400)
PLATELET # BLD AUTO: 100 K/UL — LOW (ref 150–400)
PLATELET # BLD AUTO: 103 K/UL — LOW (ref 150–400)
PLATELET # BLD AUTO: 104 K/UL — LOW (ref 150–400)
PLATELET # BLD AUTO: 107 K/UL — LOW (ref 150–400)
PLATELET # BLD AUTO: 112 K/UL — LOW (ref 150–400)
PLATELET # BLD AUTO: 113 K/UL — LOW (ref 150–400)
PLATELET # BLD AUTO: 118 K/UL — LOW (ref 150–400)
PLATELET # BLD AUTO: 131 K/UL — LOW (ref 150–400)
PLATELET # BLD AUTO: 83 K/UL — LOW (ref 150–400)
PLATELET # BLD AUTO: 85 K/UL — LOW (ref 150–400)
PLATELET # BLD AUTO: 88 K/UL — LOW (ref 150–400)
PLATELET # BLD AUTO: 89 K/UL — LOW (ref 150–400)
PLATELET # BLD AUTO: 94 K/UL — LOW (ref 150–400)
PLATELET # BLD AUTO: 96 K/UL — LOW (ref 150–400)
PLATELET # BLD AUTO: 98 K/UL — LOW (ref 150–400)
PLATELET # BLD AUTO: 99 K/UL — LOW (ref 150–400)
POTASSIUM SERPL-SCNC: 4.4 MMOL/L
POTASSIUM SERPL-SCNC: 4.4 MMOL/L
POTASSIUM SERPL-SCNC: 4.7 MMOL/L
POTASSIUM SERPL-SCNC: 4.9 MMOL/L
PROT SERPL-MCNC: 6.7 G/DL
PROT SERPL-MCNC: 6.7 G/DL
PROT SERPL-MCNC: 6.8 G/DL
PROT SERPL-MCNC: 6.9 G/DL
RBC # BLD: 3.25 M/UL — LOW (ref 4.2–5.8)
RBC # BLD: 3.31 M/UL — LOW (ref 4.2–5.8)
RBC # BLD: 3.37 M/UL — LOW (ref 4.2–5.8)
RBC # BLD: 3.41 M/UL — LOW (ref 4.2–5.8)
RBC # BLD: 3.42 M/UL — LOW (ref 4.2–5.8)
RBC # BLD: 3.44 M/UL — LOW (ref 4.2–5.8)
RBC # BLD: 3.44 M/UL — LOW (ref 4.2–5.8)
RBC # BLD: 3.49 M/UL — LOW (ref 4.2–5.8)
RBC # BLD: 3.49 M/UL — LOW (ref 4.2–5.8)
RBC # BLD: 3.5 M/UL — LOW (ref 4.2–5.8)
RBC # BLD: 3.52 M/UL — LOW (ref 4.2–5.8)
RBC # BLD: 3.56 M/UL — LOW (ref 4.2–5.8)
RBC # BLD: 3.62 M/UL — LOW (ref 4.2–5.8)
RBC # BLD: 3.63 M/UL — LOW (ref 4.2–5.8)
RBC # BLD: 3.65 M/UL — LOW (ref 4.2–5.8)
RBC # BLD: 3.7 M/UL — LOW (ref 4.2–5.8)
RBC # BLD: 3.71 M/UL — LOW (ref 4.2–5.8)
RBC # BLD: 3.75 M/UL — LOW (ref 4.2–5.8)
RBC # BLD: 4.03 M/UL — LOW (ref 4.2–5.8)
RBC # FLD: 11.9 % — SIGNIFICANT CHANGE UP (ref 10.3–14.5)
RBC # FLD: 12 % — SIGNIFICANT CHANGE UP (ref 10.3–14.5)
RBC # FLD: 12.2 % — SIGNIFICANT CHANGE UP (ref 10.3–14.5)
RBC # FLD: 12.2 % — SIGNIFICANT CHANGE UP (ref 10.3–14.5)
RBC # FLD: 12.3 % — SIGNIFICANT CHANGE UP (ref 10.3–14.5)
RBC # FLD: 12.6 % — SIGNIFICANT CHANGE UP (ref 10.3–14.5)
RBC # FLD: 12.8 % — SIGNIFICANT CHANGE UP (ref 10.3–14.5)
RBC # FLD: 13 % — SIGNIFICANT CHANGE UP (ref 10.3–14.5)
RBC # FLD: 13.2 % — SIGNIFICANT CHANGE UP (ref 10.3–14.5)
RBC # FLD: 13.5 % — SIGNIFICANT CHANGE UP (ref 10.3–14.5)
RBC # FLD: 13.8 % — SIGNIFICANT CHANGE UP (ref 10.3–14.5)
RBC # FLD: 13.8 % — SIGNIFICANT CHANGE UP (ref 10.3–14.5)
RBC # FLD: 14 % — SIGNIFICANT CHANGE UP (ref 10.3–14.5)
RBC BLD AUTO: SIGNIFICANT CHANGE UP
SODIUM SERPL-SCNC: 134 MMOL/L
SODIUM SERPL-SCNC: 137 MMOL/L
SODIUM SERPL-SCNC: 140 MMOL/L
SODIUM SERPL-SCNC: 141 MMOL/L
WBC # BLD: 3.82 K/UL — SIGNIFICANT CHANGE UP (ref 3.8–10.5)
WBC # BLD: 3.92 K/UL — SIGNIFICANT CHANGE UP (ref 3.8–10.5)
WBC # BLD: 4.14 K/UL — SIGNIFICANT CHANGE UP (ref 3.8–10.5)
WBC # BLD: 4.29 K/UL — SIGNIFICANT CHANGE UP (ref 3.8–10.5)
WBC # BLD: 4.41 K/UL — SIGNIFICANT CHANGE UP (ref 3.8–10.5)
WBC # BLD: 4.41 K/UL — SIGNIFICANT CHANGE UP (ref 3.8–10.5)
WBC # BLD: 4.48 K/UL — SIGNIFICANT CHANGE UP (ref 3.8–10.5)
WBC # BLD: 4.71 K/UL — SIGNIFICANT CHANGE UP (ref 3.8–10.5)
WBC # BLD: 4.76 K/UL — SIGNIFICANT CHANGE UP (ref 3.8–10.5)
WBC # BLD: 4.89 K/UL — SIGNIFICANT CHANGE UP (ref 3.8–10.5)
WBC # BLD: 4.91 K/UL — SIGNIFICANT CHANGE UP (ref 3.8–10.5)
WBC # BLD: 5.04 K/UL — SIGNIFICANT CHANGE UP (ref 3.8–10.5)
WBC # BLD: 5.12 K/UL — SIGNIFICANT CHANGE UP (ref 3.8–10.5)
WBC # BLD: 5.32 K/UL — SIGNIFICANT CHANGE UP (ref 3.8–10.5)
WBC # BLD: 5.47 K/UL — SIGNIFICANT CHANGE UP (ref 3.8–10.5)
WBC # BLD: 6.03 K/UL — SIGNIFICANT CHANGE UP (ref 3.8–10.5)
WBC # BLD: 6.27 K/UL — SIGNIFICANT CHANGE UP (ref 3.8–10.5)
WBC # BLD: 6.35 K/UL — SIGNIFICANT CHANGE UP (ref 3.8–10.5)
WBC # BLD: 7.47 K/UL — SIGNIFICANT CHANGE UP (ref 3.8–10.5)
WBC # FLD AUTO: 3.82 K/UL — SIGNIFICANT CHANGE UP (ref 3.8–10.5)
WBC # FLD AUTO: 3.92 K/UL — SIGNIFICANT CHANGE UP (ref 3.8–10.5)
WBC # FLD AUTO: 4.14 K/UL — SIGNIFICANT CHANGE UP (ref 3.8–10.5)
WBC # FLD AUTO: 4.29 K/UL — SIGNIFICANT CHANGE UP (ref 3.8–10.5)
WBC # FLD AUTO: 4.41 K/UL — SIGNIFICANT CHANGE UP (ref 3.8–10.5)
WBC # FLD AUTO: 4.41 K/UL — SIGNIFICANT CHANGE UP (ref 3.8–10.5)
WBC # FLD AUTO: 4.48 K/UL — SIGNIFICANT CHANGE UP (ref 3.8–10.5)
WBC # FLD AUTO: 4.71 K/UL — SIGNIFICANT CHANGE UP (ref 3.8–10.5)
WBC # FLD AUTO: 4.76 K/UL — SIGNIFICANT CHANGE UP (ref 3.8–10.5)
WBC # FLD AUTO: 4.89 K/UL — SIGNIFICANT CHANGE UP (ref 3.8–10.5)
WBC # FLD AUTO: 4.91 K/UL — SIGNIFICANT CHANGE UP (ref 3.8–10.5)
WBC # FLD AUTO: 5.04 K/UL — SIGNIFICANT CHANGE UP (ref 3.8–10.5)
WBC # FLD AUTO: 5.12 K/UL — SIGNIFICANT CHANGE UP (ref 3.8–10.5)
WBC # FLD AUTO: 5.32 K/UL — SIGNIFICANT CHANGE UP (ref 3.8–10.5)
WBC # FLD AUTO: 5.47 K/UL — SIGNIFICANT CHANGE UP (ref 3.8–10.5)
WBC # FLD AUTO: 6.03 K/UL — SIGNIFICANT CHANGE UP (ref 3.8–10.5)
WBC # FLD AUTO: 6.27 K/UL — SIGNIFICANT CHANGE UP (ref 3.8–10.5)
WBC # FLD AUTO: 6.35 K/UL — SIGNIFICANT CHANGE UP (ref 3.8–10.5)
WBC # FLD AUTO: 7.47 K/UL — SIGNIFICANT CHANGE UP (ref 3.8–10.5)

## 2022-01-01 PROCEDURE — 99215 OFFICE O/P EST HI 40 MIN: CPT

## 2022-01-01 PROCEDURE — 93000 ELECTROCARDIOGRAM COMPLETE: CPT

## 2022-01-01 PROCEDURE — 92015 DETERMINE REFRACTIVE STATE: CPT | Mod: NC

## 2022-01-01 PROCEDURE — 70553 MRI BRAIN STEM W/O & W/DYE: CPT | Mod: 26

## 2022-01-01 PROCEDURE — A9585: CPT

## 2022-01-01 PROCEDURE — 99214 OFFICE O/P EST MOD 30 MIN: CPT | Mod: 25

## 2022-01-01 PROCEDURE — 99214 OFFICE O/P EST MOD 30 MIN: CPT

## 2022-01-01 PROCEDURE — 92133 CPTRZD OPH DX IMG PST SGM ON: CPT

## 2022-01-01 PROCEDURE — 70553 MRI BRAIN STEM W/O & W/DYE: CPT

## 2022-01-01 PROCEDURE — 92083 EXTENDED VISUAL FIELD XM: CPT

## 2022-01-01 PROCEDURE — 99212 OFFICE O/P EST SF 10 MIN: CPT

## 2022-01-01 PROCEDURE — 31622 DX BRONCHOSCOPE/WASH: CPT

## 2022-01-01 RX ORDER — TEMOZOLOMIDE 140 MG/1
140 CAPSULE ORAL AT BEDTIME
Qty: 4 | Refills: 0 | Status: DISCONTINUED | COMMUNITY
Start: 2022-01-01 | End: 2022-01-01

## 2022-01-24 ENCOUNTER — INPATIENT (INPATIENT)
Facility: HOSPITAL | Age: 67
LOS: 14 days | Discharge: NURSING, REHAB OR LONG-TERM CARE | DRG: 25 | End: 2022-02-08
Attending: NEUROLOGICAL SURGERY | Admitting: NEUROLOGICAL SURGERY
Payer: COMMERCIAL

## 2022-01-24 VITALS
TEMPERATURE: 98 F | HEIGHT: 70 IN | SYSTOLIC BLOOD PRESSURE: 116 MMHG | WEIGHT: 171.96 LBS | HEART RATE: 80 BPM | RESPIRATION RATE: 19 BRPM | DIASTOLIC BLOOD PRESSURE: 79 MMHG | OXYGEN SATURATION: 100 %

## 2022-01-24 DIAGNOSIS — G93.89 OTHER SPECIFIED DISORDERS OF BRAIN: ICD-10-CM

## 2022-01-24 LAB
ALBUMIN SERPL ELPH-MCNC: 4.5 G/DL — SIGNIFICANT CHANGE UP (ref 3.3–5)
ALP SERPL-CCNC: 69 U/L — SIGNIFICANT CHANGE UP (ref 40–120)
ALT FLD-CCNC: 27 U/L — SIGNIFICANT CHANGE UP (ref 10–45)
ANION GAP SERPL CALC-SCNC: 14 MMOL/L — SIGNIFICANT CHANGE UP (ref 5–17)
APTT BLD: 27.6 SEC — SIGNIFICANT CHANGE UP (ref 27.5–35.5)
AST SERPL-CCNC: 12 U/L — SIGNIFICANT CHANGE UP (ref 10–40)
BASOPHILS # BLD AUTO: 0.06 K/UL — SIGNIFICANT CHANGE UP (ref 0–0.2)
BASOPHILS NFR BLD AUTO: 0.7 % — SIGNIFICANT CHANGE UP (ref 0–2)
BILIRUB SERPL-MCNC: 0.7 MG/DL — SIGNIFICANT CHANGE UP (ref 0.2–1.2)
BLD GP AB SCN SERPL QL: NEGATIVE — SIGNIFICANT CHANGE UP
BUN SERPL-MCNC: 15 MG/DL — SIGNIFICANT CHANGE UP (ref 7–23)
CALCIUM SERPL-MCNC: 9.9 MG/DL — SIGNIFICANT CHANGE UP (ref 8.4–10.5)
CHLORIDE SERPL-SCNC: 98 MMOL/L — SIGNIFICANT CHANGE UP (ref 96–108)
CO2 SERPL-SCNC: 24 MMOL/L — SIGNIFICANT CHANGE UP (ref 22–31)
CREAT SERPL-MCNC: 0.9 MG/DL — SIGNIFICANT CHANGE UP (ref 0.5–1.3)
EOSINOPHIL # BLD AUTO: 0.51 K/UL — HIGH (ref 0–0.5)
EOSINOPHIL NFR BLD AUTO: 5.5 % — SIGNIFICANT CHANGE UP (ref 0–6)
GLUCOSE BLDC GLUCOMTR-MCNC: 245 MG/DL — HIGH (ref 70–99)
GLUCOSE SERPL-MCNC: 176 MG/DL — HIGH (ref 70–99)
HCT VFR BLD CALC: 48.8 % — SIGNIFICANT CHANGE UP (ref 39–50)
HGB BLD-MCNC: 15.7 G/DL — SIGNIFICANT CHANGE UP (ref 13–17)
IMM GRANULOCYTES NFR BLD AUTO: 0.2 % — SIGNIFICANT CHANGE UP (ref 0–1.5)
INR BLD: 1.02 RATIO — SIGNIFICANT CHANGE UP (ref 0.88–1.16)
LYMPHOCYTES # BLD AUTO: 2.73 K/UL — SIGNIFICANT CHANGE UP (ref 1–3.3)
LYMPHOCYTES # BLD AUTO: 29.6 % — SIGNIFICANT CHANGE UP (ref 13–44)
MCHC RBC-ENTMCNC: 30.3 PG — SIGNIFICANT CHANGE UP (ref 27–34)
MCHC RBC-ENTMCNC: 32.2 GM/DL — SIGNIFICANT CHANGE UP (ref 32–36)
MCV RBC AUTO: 94 FL — SIGNIFICANT CHANGE UP (ref 80–100)
MONOCYTES # BLD AUTO: 0.69 K/UL — SIGNIFICANT CHANGE UP (ref 0–0.9)
MONOCYTES NFR BLD AUTO: 7.5 % — SIGNIFICANT CHANGE UP (ref 2–14)
NEUTROPHILS # BLD AUTO: 5.22 K/UL — SIGNIFICANT CHANGE UP (ref 1.8–7.4)
NEUTROPHILS NFR BLD AUTO: 56.5 % — SIGNIFICANT CHANGE UP (ref 43–77)
NRBC # BLD: 0 /100 WBCS — SIGNIFICANT CHANGE UP (ref 0–0)
PLATELET # BLD AUTO: 140 K/UL — LOW (ref 150–400)
POTASSIUM SERPL-MCNC: 3.8 MMOL/L — SIGNIFICANT CHANGE UP (ref 3.5–5.3)
POTASSIUM SERPL-SCNC: 3.8 MMOL/L — SIGNIFICANT CHANGE UP (ref 3.5–5.3)
PROT SERPL-MCNC: 7.7 G/DL — SIGNIFICANT CHANGE UP (ref 6–8.3)
PROTHROM AB SERPL-ACNC: 12.2 SEC — SIGNIFICANT CHANGE UP (ref 10.6–13.6)
RBC # BLD: 5.19 M/UL — SIGNIFICANT CHANGE UP (ref 4.2–5.8)
RBC # FLD: 11.5 % — SIGNIFICANT CHANGE UP (ref 10.3–14.5)
RH IG SCN BLD-IMP: POSITIVE — SIGNIFICANT CHANGE UP
RH IG SCN BLD-IMP: POSITIVE — SIGNIFICANT CHANGE UP
SARS-COV-2 RNA SPEC QL NAA+PROBE: SIGNIFICANT CHANGE UP
SODIUM SERPL-SCNC: 136 MMOL/L — SIGNIFICANT CHANGE UP (ref 135–145)
WBC # BLD: 9.23 K/UL — SIGNIFICANT CHANGE UP (ref 3.8–10.5)
WBC # FLD AUTO: 9.23 K/UL — SIGNIFICANT CHANGE UP (ref 3.8–10.5)

## 2022-01-24 PROCEDURE — 99291 CRITICAL CARE FIRST HOUR: CPT

## 2022-01-24 PROCEDURE — 93010 ELECTROCARDIOGRAM REPORT: CPT

## 2022-01-24 PROCEDURE — 70450 CT HEAD/BRAIN W/O DYE: CPT | Mod: 26,MA

## 2022-01-24 PROCEDURE — 71045 X-RAY EXAM CHEST 1 VIEW: CPT | Mod: 26,59

## 2022-01-24 RX ORDER — DEXTROSE 50 % IN WATER 50 %
25 SYRINGE (ML) INTRAVENOUS ONCE
Refills: 0 | Status: DISCONTINUED | OUTPATIENT
Start: 2022-01-24 | End: 2022-01-27

## 2022-01-24 RX ORDER — SODIUM CHLORIDE 9 MG/ML
1000 INJECTION, SOLUTION INTRAVENOUS
Refills: 0 | Status: DISCONTINUED | OUTPATIENT
Start: 2022-01-24 | End: 2022-01-27

## 2022-01-24 RX ORDER — DEXTROSE 50 % IN WATER 50 %
12.5 SYRINGE (ML) INTRAVENOUS ONCE
Refills: 0 | Status: DISCONTINUED | OUTPATIENT
Start: 2022-01-24 | End: 2022-01-27

## 2022-01-24 RX ORDER — DEXAMETHASONE 0.5 MG/5ML
4 ELIXIR ORAL ONCE
Refills: 0 | Status: DISCONTINUED | OUTPATIENT
Start: 2022-01-24 | End: 2022-01-26

## 2022-01-24 RX ORDER — CHLORHEXIDINE GLUCONATE 213 G/1000ML
1 SOLUTION TOPICAL DAILY
Refills: 0 | Status: DISCONTINUED | OUTPATIENT
Start: 2022-01-24 | End: 2022-01-27

## 2022-01-24 RX ORDER — LEVETIRACETAM 250 MG/1
1000 TABLET, FILM COATED ORAL ONCE
Refills: 0 | Status: COMPLETED | OUTPATIENT
Start: 2022-01-24 | End: 2022-01-24

## 2022-01-24 RX ORDER — LEVETIRACETAM 250 MG/1
500 TABLET, FILM COATED ORAL EVERY 12 HOURS
Refills: 0 | Status: DISCONTINUED | OUTPATIENT
Start: 2022-01-24 | End: 2022-01-25

## 2022-01-24 RX ORDER — SENNA PLUS 8.6 MG/1
2 TABLET ORAL AT BEDTIME
Refills: 0 | Status: DISCONTINUED | OUTPATIENT
Start: 2022-01-24 | End: 2022-01-25

## 2022-01-24 RX ORDER — DEXTROSE 50 % IN WATER 50 %
15 SYRINGE (ML) INTRAVENOUS ONCE
Refills: 0 | Status: DISCONTINUED | OUTPATIENT
Start: 2022-01-24 | End: 2022-01-27

## 2022-01-24 RX ORDER — LOSARTAN POTASSIUM 100 MG/1
50 TABLET, FILM COATED ORAL DAILY
Refills: 0 | Status: DISCONTINUED | OUTPATIENT
Start: 2022-01-24 | End: 2022-01-25

## 2022-01-24 RX ORDER — DEXAMETHASONE 0.5 MG/5ML
4 ELIXIR ORAL EVERY 6 HOURS
Refills: 0 | Status: DISCONTINUED | OUTPATIENT
Start: 2022-01-24 | End: 2022-01-27

## 2022-01-24 RX ORDER — ATORVASTATIN CALCIUM 80 MG/1
20 TABLET, FILM COATED ORAL AT BEDTIME
Refills: 0 | Status: DISCONTINUED | OUTPATIENT
Start: 2022-01-24 | End: 2022-01-27

## 2022-01-24 RX ORDER — INSULIN LISPRO 100/ML
VIAL (ML) SUBCUTANEOUS AT BEDTIME
Refills: 0 | Status: DISCONTINUED | OUTPATIENT
Start: 2022-01-24 | End: 2022-01-25

## 2022-01-24 RX ORDER — INSULIN LISPRO 100/ML
VIAL (ML) SUBCUTANEOUS
Refills: 0 | Status: DISCONTINUED | OUTPATIENT
Start: 2022-01-24 | End: 2022-01-25

## 2022-01-24 RX ORDER — GLUCAGON INJECTION, SOLUTION 0.5 MG/.1ML
1 INJECTION, SOLUTION SUBCUTANEOUS ONCE
Refills: 0 | Status: DISCONTINUED | OUTPATIENT
Start: 2022-01-24 | End: 2022-01-27

## 2022-01-24 RX ORDER — PANTOPRAZOLE SODIUM 20 MG/1
40 TABLET, DELAYED RELEASE ORAL
Refills: 0 | Status: DISCONTINUED | OUTPATIENT
Start: 2022-01-24 | End: 2022-01-27

## 2022-01-24 RX ORDER — LISINOPRIL 2.5 MG/1
2.5 TABLET ORAL DAILY
Refills: 0 | Status: DISCONTINUED | OUTPATIENT
Start: 2022-01-24 | End: 2022-01-27

## 2022-01-24 RX ORDER — ACETAMINOPHEN 500 MG
650 TABLET ORAL EVERY 6 HOURS
Refills: 0 | Status: DISCONTINUED | OUTPATIENT
Start: 2022-01-24 | End: 2022-01-27

## 2022-01-24 RX ADMIN — LEVETIRACETAM 1000 MILLIGRAM(S): 250 TABLET, FILM COATED ORAL at 19:41

## 2022-01-24 RX ADMIN — LEVETIRACETAM 400 MILLIGRAM(S): 250 TABLET, FILM COATED ORAL at 19:22

## 2022-01-24 NOTE — H&P ADULT - NSHPLABSRESULTS_GEN_ALL_CORE
15.7   9.23  )-----------( 140      ( 24 Jan 2022 19:30 )             48.8   01-24    136  |  98  |  15  ----------------------------<  176<H>  3.8   |  24  |  0.90    Ca    9.9      24 Jan 2022 19:30    TPro  7.7  /  Alb  4.5  /  TBili  0.7  /  DBili  x   /  AST  12  /  ALT  27  /  AlkPhos  69  01-24

## 2022-01-24 NOTE — ED ADULT TRIAGE NOTE - CHIEF COMPLAINT QUOTE
Sent in by DR. Butterfield for worsening AMS x 1 month and "neurosurgical admission", Dx Glioblastoma

## 2022-01-24 NOTE — ED PROVIDER NOTE - CLINICAL SUMMARY MEDICAL DECISION MAKING FREE TEXT BOX
67 yo M hx of DM on oral medications, sent in by Dr. Butterfield for neurosurgery eval, diagnosed with glioblastoma in the [place] on 1/7. Moving all extremities, stable vitals, moving all extremities, AOxname, unable to follow commands. Neurosurgery recommended CT head non con and pre-op labs. Will see patient shortly. Admit to neurosurgery floor vs ICU. 65 yo M hx of DM on oral medications, sent in by Dr. Butterfield for neurosurgery eval, diagnosed with glioblastoma in the [place] on 1/7. Moving all extremities, stable vitals, moving all extremities, AOxname, unable to follow commands. Neurosurgery recommended CT head non con, keppra, and pre-op labs. Will see patient shortly. Admit to neurosurgery floor vs ICU.

## 2022-01-24 NOTE — ED PROVIDER NOTE - OBJECTIVE STATEMENT
67 yo M hx of DM on oral medications, sent in by Dr. Butterfield for neurosurgery eval. Patient answers his name to all questions asked. History obtained from daughter at bedside. On 1/7, pt was diagnosed with glioblastoma in the [place] because he was answering questions inappropriately. Patient arrived back in the [place] on 1/20 and has been unable to say anything other than his name since. No other complaints.

## 2022-01-24 NOTE — ED ADULT NURSE NOTE - OBJECTIVE STATEMENT
Pt is an ambulatory 66 yr old male a/oX 1 brought in by family after being diagnosed with a glioblastoma in early [place]>  Pt was complaining of headaches and photophobia that prompted him to come to [facility] when arriving home.  PERRL wnl, pérez with equal strength. Daughter states that he has been having memory difficulties for about four months and had trouble swallowing pills this morning LUNGS CTA.  No chest pain or sob.  Abdomen NT ND.  No urinary symptoms.  Peripheral pulses +2bl no edema. Pt is an ambulatory 66 yr old male a/oX 1 brought in by family after being diagnosed with a glioblastoma in early January in .  Pt was complaining of headaches and photophobia that prompted him to come to [facility] when arriving home.  PERRL wnl, pérez with equal strength. Daughter states that he has been having memory difficulties for about four months and had trouble swallowing pills this morning LUNGS CTA.  NSR on cm at 87 bpm. No chest pain or sob.  Abdomen NT ND.  No urinary symptoms.  Peripheral pulses +2bl no edema.

## 2022-01-24 NOTE — ED PROVIDER NOTE - ATTENDING CONTRIBUTION TO CARE
Sudhir George MD, FACEP: In this physician's medical judgement based on clinical history and physical exam: patient with history of GBM diagnosed in [place] at the beginning of the month due to mental status changes of anger and changed mood. Moderate persistent. patient reported back to [facility] after being admitted for several days. patient is having persistent headaches and reports to this emergency department for evaluation.   patient tracking, moving upper extremities spontaneously  ncat  perrl  mmm  trachea midline  non-tachycardic  non-tachypneic  no gross deformity of extremities, no asymmetry  no edema  no rash/vesicles/petechiae  without capacity at this time, confabulating, alert to person  will get iv, cbc, cmp, ct head, neurosurgery consult  Will follow up on labs, analgesia, imaging, reassess and disposition to the inpatient team as clinically indicated.  *The above represents an initial assessment/impression. Please refer to my progress notes below for potential changes in patient clinical course*   Patient endorsed to the neurosurgical team at the time of admission. Based on patient's history and physical exam, as well as the results of today's workup, I feel that patient warrants admission to the hospital for further workup/evaluation and continued management. I discussed the findings of today's workup with the patient and addressed the patient's questions and concerns. The patient was agreeable with admission. Our team spoke with the inpatient receiving team who accepted the patient for admission and subsequently took over the patient's care at the time of admission. The receiving team will follow up on pending labs, analgesia, any clinical imaging results, ancillary findings, reassess, and disposition as clinically indicated. Details of patient and plan conveyed to receiving physician team and conveyed back for understanding. There were no questions at this time about the patient's status, disposition, and plan. Patient's care to be taken over by receiving physician team at this time, all decisions regarding the progression of care will be made at their discretion.

## 2022-01-24 NOTE — ED PROVIDER NOTE - PROGRESS NOTE DETAILS
O'Elan DO PGY-2: received sign out on this patient from day team (Dr. Doyle). Dispo pending CTr. NSGY aware of pt. TBA floor vs NSCU O'Elan DO PGY-2: called NSGY to inform them of midline shift

## 2022-01-24 NOTE — PROGRESS NOTE ADULT - SUBJECTIVE AND OBJECTIVE BOX
HPI:  66M hx HTN/DM was in the [place] for vacation early January, was confused and not answering appropriately, MRI Jan 7 showed L frontal enhancing mass with significant edema. Repeat CTH today showed significant vasogenic edema and 1.5cm MLS. Exam: Awake, Ox1, answering many questions inappropriately/perseverating, EOMI, R drift, DUMONT strongl (24 Jan 2022 20:04)          ICU Vital Signs Last 24 Hrs  T(C): 36.7 (24 Jan 2022 22:30), Max: 36.9 (24 Jan 2022 18:41)  T(F): 98 (24 Jan 2022 22:30), Max: 98.4 (24 Jan 2022 18:41)  HR: 65 (24 Jan 2022 22:30) (65 - 80)  BP: 117/77 (24 Jan 2022 22:30) (116/79 - 121/74)  BP(mean): 88 (24 Jan 2022 19:39) (88 - 88)  ABP: --  ABP(mean): --  RR: 14 (24 Jan 2022 22:30) (14 - 19)  SpO2: 98% (24 Jan 2022 22:30) (98% - 100%)                            15.7   9.23  )-----------( 140      ( 24 Jan 2022 19:30 )             48.8    01-24    136  |  98  |  15  ----------------------------<  176<H>  3.8   |  24  |  0.90    Ca    9.9      24 Jan 2022 19:30    TPro  7.7  /  Alb  4.5  /  TBili  0.7  /  DBili  x   /  AST  12  /  ALT  27  /  AlkPhos  69  01-24            PHYSICAL EXAM:    General: No Acute Distress     Neurological: Awake, alert oriented to person, place and time, Following Commands, PERRL, EOMI, Face Symmetrical, Speech Fluent, Moving all extremities, Muscle Strength normal in all four extremities, No Drift, Sensation to Light Touch Intact    Pulmonary: Clear to Auscultation, No Rales, No Rhonchi, No Wheezes     Cardiovascular: S1, S2, Regular Rate and Rhythm     Gastrointestinal: Soft, Nontender, Nondistended     Extremities: No calf tenderness     Incision:       MEDICATIONS:  Antibiotics:      Neurological:   acetaminophen     Tablet .. 650 milliGRAM(s) Oral every 6 hours PRN  levETIRAcetam 500 milliGRAM(s) Oral every 12 hours    Cardiac:   lisinopril 2.5 milliGRAM(s) Oral daily  losartan 50 milliGRAM(s) Oral daily    Pulm:    Heme:     Other:   atorvastatin 20 milliGRAM(s) Oral at bedtime  bisacodyl 5 milliGRAM(s) Oral daily PRN Constipation  dexAMETHasone     Tablet 4 milliGRAM(s) Oral every 6 hours  dexAMETHasone  Injectable 4 milliGRAM(s) IV Push once  dextrose 40% Gel 15 Gram(s) Oral once  dextrose 5%. 1000 milliLiter(s) IV Continuous <Continuous>  dextrose 5%. 1000 milliLiter(s) IV Continuous <Continuous>  dextrose 50% Injectable 25 Gram(s) IV Push once  dextrose 50% Injectable 12.5 Gram(s) IV Push once  dextrose 50% Injectable 25 Gram(s) IV Push once  glucagon  Injectable 1 milliGRAM(s) IntraMuscular once  insulin lispro (ADMELOG) corrective regimen sliding scale   SubCutaneous three times a day before meals  insulin lispro (ADMELOG) corrective regimen sliding scale   SubCutaneous at bedtime  pantoprazole    Tablet 40 milliGRAM(s) Oral before breakfast  senna 2 Tablet(s) Oral at bedtime PRN Constipation       DEVICES: [] Restraints [] CAREN/HMV []LD [] ET tube [] Trach [] Chest Tube [] A-line [] Campuzano [] NGT [] Rectal Tube       A/P:  HPI:  66M presenting with brain mass with significant vasogenic edema with brain compression and midline shift     Neuro:   neuro checks q 1 hr  decadron 4 mg q 6 hr for vasogenic edema   MRI brain wwo contrast   keppra 500 mg BID per NS for seizure prophylaxis   Respiratory: RA  CV:HTN on lisinopril and losartan   Endocrine: sugar goal 120-180        DVT ppx: SCD, venous dopplers as he is high risk for DVT, holc chemoprophylaxis in case of NS procedure tomorrow   Renal: NS 75 ml/hr   ID: afebrile   GI: NPO for now   Social/Family:   Discharge planning:     Code Status: [] Full Code [] DNR [] DNI [] Goals of Care:   Disposition: [] ICU [] Stroke Unit [] RCU []PCU []Floor [] Discharge Home     Patient at high risk for neurologic deterioration, critical care time, excluding procedures: 45 minutes                    HPI:  66M hx HTN/DM was [place] for vacation early January, was confused and not answering appropriately, MRI Jan 7 showed L frontal enhancing mass with significant edema. Repeat CTH today showed significant vasogenic edema and 1.5cm MLS. Exam: Awake, Ox1, answering many questions inappropriately/perseverating, EOMI, R drift, DUMONT strongl (24 Jan 2022 20:04)          ICU Vital Signs Last 24 Hrs  T(C): 36.7 (24 Jan 2022 22:30), Max: 36.9 (24 Jan 2022 18:41)  T(F): 98 (24 Jan 2022 22:30), Max: 98.4 (24 Jan 2022 18:41)  HR: 65 (24 Jan 2022 22:30) (65 - 80)  BP: 117/77 (24 Jan 2022 22:30) (116/79 - 121/74)  BP(mean): 88 (24 Jan 2022 19:39) (88 - 88)  ABP: --  ABP(mean): --  RR: 14 (24 Jan 2022 22:30) (14 - 19)  SpO2: 98% (24 Jan 2022 22:30) (98% - 100%)                            15.7   9.23  )-----------( 140      ( 24 Jan 2022 19:30 )             48.8    01-24    136  |  98  |  15  ----------------------------<  176<H>  3.8   |  24  |  0.90    Ca    9.9      24 Jan 2022 19:30    TPro  7.7  /  Alb  4.5  /  TBili  0.7  /  DBili  x   /  AST  12  /  ALT  27  /  AlkPhos  69  01-24            PHYSICAL EXAM:    General: No Acute Distress     Neurological: Awake, alert oriented to person, place and time, Following Commands, PERRL, EOMI, ,mild left facial , Speech Fluent, Moving all extremities, left UE drift, 5/5 all over except for left arm flexion 4+   Pulmonary: Clear to Auscultation, No Rales, No Rhonchi, No Wheezes     Cardiovascular: S1, S2, Regular Rate and Rhythm     Gastrointestinal: Soft, Nontender, Nondistended     Extremities: No calf tenderness     Incision:       MEDICATIONS:  Antibiotics:      Neurological:   acetaminophen     Tablet .. 650 milliGRAM(s) Oral every 6 hours PRN  levETIRAcetam 500 milliGRAM(s) Oral every 12 hours    Cardiac:   lisinopril 2.5 milliGRAM(s) Oral daily  losartan 50 milliGRAM(s) Oral daily    Pulm:    Heme:     Other:   atorvastatin 20 milliGRAM(s) Oral at bedtime  bisacodyl 5 milliGRAM(s) Oral daily PRN Constipation  dexAMETHasone     Tablet 4 milliGRAM(s) Oral every 6 hours  dexAMETHasone  Injectable 4 milliGRAM(s) IV Push once  dextrose 40% Gel 15 Gram(s) Oral once  dextrose 5%. 1000 milliLiter(s) IV Continuous <Continuous>  dextrose 5%. 1000 milliLiter(s) IV Continuous <Continuous>  dextrose 50% Injectable 25 Gram(s) IV Push once  dextrose 50% Injectable 12.5 Gram(s) IV Push once  dextrose 50% Injectable 25 Gram(s) IV Push once  glucagon  Injectable 1 milliGRAM(s) IntraMuscular once  insulin lispro (ADMELOG) corrective regimen sliding scale   SubCutaneous three times a day before meals  insulin lispro (ADMELOG) corrective regimen sliding scale   SubCutaneous at bedtime  pantoprazole    Tablet 40 milliGRAM(s) Oral before breakfast  senna 2 Tablet(s) Oral at bedtime PRN Constipation       DEVICES: [] Restraints [] CAREN/HMV []LD [] ET tube [] Trach [] Chest Tube [] A-line [] Campuzano [] NGT [] Rectal Tube       A/P:  HPI:  66M presenting with brain mass with significant vasogenic edema with brain compression and midline shift     Neuro:   neuro checks q 1 hr  decadron 4 mg q 6 hr for vasogenic edema   MRI brain wwo contrast   keppra 500 mg BID per NS for seizure prophylaxis   Respiratory: RA  CV:HTN on lisinopril and losartan   Endocrine: sugar goal 120-180        DVT ppx: SCD, venous dopplers as he is high risk for DVT, hold chemoprophylaxis in case of NS procedure tomorrow   Renal: NS 75 ml/hr   ID: afebrile   GI: NPO for now   Social/Family:   Discharge planning:     Code Status: [] Full Code [] DNR [] DNI [] Goals of Care:   Disposition: [] ICU [] Stroke Unit [] RCU []PCU []Floor [] Discharge Home     Patient at high risk for neurologic deterioration, including brain herniation , critical care time, excluding procedures: 35 minutes                    HPI:  66M hx HTN/DM was [place] for vacation early January, was confused and not answering appropriately, MRI Jan 7 showed L frontal enhancing mass with significant edema. Repeat CTH today showed significant vasogenic edema and 1.5cm MLS. Exam: Awake, Ox1, answering many questions inappropriately/perseverating, EOMI, R drift, DUMONT strongl (24 Jan 2022 20:04)          ICU Vital Signs Last 24 Hrs  T(C): 36.7 (24 Jan 2022 22:30), Max: 36.9 (24 Jan 2022 18:41)  T(F): 98 (24 Jan 2022 22:30), Max: 98.4 (24 Jan 2022 18:41)  HR: 65 (24 Jan 2022 22:30) (65 - 80)  BP: 117/77 (24 Jan 2022 22:30) (116/79 - 121/74)  BP(mean): 88 (24 Jan 2022 19:39) (88 - 88)  ABP: --  ABP(mean): --  RR: 14 (24 Jan 2022 22:30) (14 - 19)  SpO2: 98% (24 Jan 2022 22:30) (98% - 100%)                            15.7   9.23  )-----------( 140      ( 24 Jan 2022 19:30 )             48.8    01-24    136  |  98  |  15  ----------------------------<  176<H>  3.8   |  24  |  0.90    Ca    9.9      24 Jan 2022 19:30    TPro  7.7  /  Alb  4.5  /  TBili  0.7  /  DBili  x   /  AST  12  /  ALT  27  /  AlkPhos  69  01-24            PHYSICAL EXAM:    General: No Acute Distress     Neurological: Awake, alert oriented to person, place and time, with choice ,  Following Commands, PERRL, EOMI, ,mild left facial , Speech Fluent, Moving all extremities, left UE drift, 5/5 all over except for left arm flexion 4+   Pulmonary: Clear to Auscultation, No Rales, No Rhonchi, No Wheezes     Cardiovascular: S1, S2, Regular Rate and Rhythm     Gastrointestinal: Soft, Nontender, Nondistended     Extremities: No calf tenderness     Incision:       MEDICATIONS:  Antibiotics:      Neurological:   acetaminophen     Tablet .. 650 milliGRAM(s) Oral every 6 hours PRN  levETIRAcetam 500 milliGRAM(s) Oral every 12 hours    Cardiac:   lisinopril 2.5 milliGRAM(s) Oral daily  losartan 50 milliGRAM(s) Oral daily    Pulm:    Heme:     Other:   atorvastatin 20 milliGRAM(s) Oral at bedtime  bisacodyl 5 milliGRAM(s) Oral daily PRN Constipation  dexAMETHasone     Tablet 4 milliGRAM(s) Oral every 6 hours  dexAMETHasone  Injectable 4 milliGRAM(s) IV Push once  dextrose 40% Gel 15 Gram(s) Oral once  dextrose 5%. 1000 milliLiter(s) IV Continuous <Continuous>  dextrose 5%. 1000 milliLiter(s) IV Continuous <Continuous>  dextrose 50% Injectable 25 Gram(s) IV Push once  dextrose 50% Injectable 12.5 Gram(s) IV Push once  dextrose 50% Injectable 25 Gram(s) IV Push once  glucagon  Injectable 1 milliGRAM(s) IntraMuscular once  insulin lispro (ADMELOG) corrective regimen sliding scale   SubCutaneous three times a day before meals  insulin lispro (ADMELOG) corrective regimen sliding scale   SubCutaneous at bedtime  pantoprazole    Tablet 40 milliGRAM(s) Oral before breakfast  senna 2 Tablet(s) Oral at bedtime PRN Constipation       DEVICES: [] Restraints [] CAREN/HMV []LD [] ET tube [] Trach [] Chest Tube [] A-line [] Campuzano [] NGT [] Rectal Tube       A/P:  66M presenting with brain mass with significant vasogenic edema with brain compression and midline shift     Neuro:   neuro checks q 1 hr  decadron 4 mg q 6 hr for vasogenic edema   MRI brain wwo contrast   keppra 500 mg BID per NS for seizure prophylaxis   Respiratory: RA  CV:HTN on lisinopril and losartan   Endocrine: sugar goal 120-180        DVT ppx: SCD, venous dopplers as he is high risk for DVT, hold chemoprophylaxis in case of NS procedure tomorrow   Renal: NS 75 ml/hr   ID: afebrile   GI: NPO for now   pantoprazole while on decadron   Social/Family:   Discharge planning:     Code Status: [] Full Code [] DNR [] DNI [] Goals of Care:   Disposition: [] ICU [] Stroke Unit [] RCU []PCU []Floor [] Discharge Home     Patient at high risk for neurologic deterioration, including brain herniation , critical care time, excluding procedures: 35 minutes

## 2022-01-24 NOTE — ED PROVIDER NOTE - PHYSICAL EXAMINATION
GEN: Patient awake NAD.   HEENT: normocephalic, atraumatic, no scleral icterus, moist MM  CARDIAC: RRR, S1, S2, no murmur.   PULM: CTA B/L no wheeze, rhonchi, rales.   ABD: soft NT, ND, no rebound no guarding  MSK: Moving all extremities, no edema.   NEURO: A&Ox Name, does not follow commands, unable to cooperate with physical exam. non focal.   SKIN: warm, dry, no rash.

## 2022-01-24 NOTE — ED ADULT NURSE REASSESSMENT NOTE - NS ED NURSE REASSESS COMMENT FT1
Pt remains in bed with daughter at bedside.  Remains at baseline mental status A/oX1.  Responds to commands but doesn't answer questions appropriately.  DUMONT with equal strength.  Pt is one person assist out of bed with unsteady gait. Pending ICU bed assignment.  Dysphagia screen failed and communicated to Dr. Marquez and Mehnaz

## 2022-01-24 NOTE — CHART NOTE - NSCHARTNOTEFT_GEN_A_CORE
CAPRINI SCORE [CLOT] Score on Admission for     AGE RELATED RISK FACTORS                                                       MOBILITY RELATED FACTORS  [ ] Age 41-60 years                                            (1 Point)                  [ ] Bed rest                                                        (1 Point)  [ x ] Age: 61-74 years                                           (2 Points)                 [ ] Plaster cast                                                   (2 Points)  [ ] Age= 75 years                                              (3 Points)                 [ ] Bed bound for more than 72 hours                 (2 Points)    DISEASE RELATED RISK FACTORS                                               GENDER SPECIFIC FACTORS  [ ] Edema in the lower extremities                       (1 Point)                  [ ] Pregnancy                                                     (1 Point)  [ ] Varicose veins                                               (1 Point)                  [ ] Post-partum < 6 weeks                                   (1 Point)             [ ] BMI > 25 Kg/m2                                            (1 Point)                  [ ] Hormonal therapy  or oral contraception          (1 Point)                 [ ] Sepsis (in the previous month)                        (1 Point)                  [ ] History of pregnancy complications                 (1 point)  [ ] Pneumonia or serious lung disease                                               [ ] Unexplained or recurrent                     (1 Point)           (in the previous month)                               (1 Point)  [ ] Abnormal pulmonary function test                     (1 Point)                 SURGERY RELATED RISK FACTORS (include planned surgeries)  [ ] Acute myocardial infarction                              (1 Point)                 [ ]  Section                                             (1 Point)  [ ] Congestive heart failure (in the previous month)  (1 Point)         [ ] Minor surgery                                                  (1 Point)   [ ] Inflammatory bowel disease                             (1 Point)                 [ ] Arthroscopic surgery                                        (2 Points)  [ ] Central venous access                                      (2 Points)                [ ] General surgery lasting more than 45 minutes   (2 Points)       [ ] Stroke (in the previous month)                          (5 Points)               [ ] Elective arthroplasty                                         (5 Points)            [ ] current or past malignancy                              (2 Points)                                                                                                       HEMATOLOGY RELATED FACTORS                                                 TRAUMA RELATED RISK FACTORS  [ ] Prior episodes of VTE                                     (3 Points)                [ ] Fracture of the hip, pelvis, or leg                       (5 Points)  [ ] Positive family history for VTE                         (3 Points)                 [ ] Acute spinal cord injury (in the previous month)  (5 Points)  [ ] Prothrombin 97623 A                                     (3 Points)                 [ ] Paralysis  (less than 1 month)                             (5 Points)  [ ] Factor V Leiden                                             (3 Points)                  [ ] Multiple Trauma within 1 month                        (5 Points)  [ ] Lupus anticoagulants                                     (3 Points)                                                           [ ] Anticardiolipin antibodies                               (3 Points)                                                       [ ] High homocysteine in the blood                      (3 Points)                                             [ ] Other congenital or acquired thrombophilia      (3 Points)                                                [ ] Heparin induced thrombocytopenia                  (3 Points)   [ ] Previous Covid-19 confirmed infection             ( 3 Points)                                        Total Score [      2       ]    Risk:  Very low 0   Low 1 to 2   Moderate 3 to 4   High =5       VTE Prophylasix Recommednations:  [ x ] mechanical pneumatic compression devices                                      [ ] contraindicated due to: _____________________  [ ] chemo prophylasix                                                                                   [ x ] contraindicated due to:    **** MODERATE/HIGH LIKELIHOOD DVT PRESENT ON ADMISSION  [ x ] (please order LE dopplers within 24 hours of admission) CAPRINI SCORE [CLOT] Score on Admission for     AGE RELATED RISK FACTORS                                                       MOBILITY RELATED FACTORS  [ ] Age 41-60 years                                            (1 Point)                  [ ] Bed rest                                                        (1 Point)  [ x ] Age: 61-74 years                                           (2 Points)                 [ ] Plaster cast                                                   (2 Points)  [ ] Age= 75 years                                              (3 Points)                 [ ] Bed bound for more than 72 hours                 (2 Points)    DISEASE RELATED RISK FACTORS                                               GENDER SPECIFIC FACTORS  [ ] Edema in the lower extremities                       (1 Point)                  [ ] Pregnancy                                                     (1 Point)  [ ] Varicose veins                                               (1 Point)                  [ ] Post-partum < 6 weeks                                   (1 Point)             [ ] BMI > 25 Kg/m2                                            (1 Point)                  [ ] Hormonal therapy  or oral contraception          (1 Point)                 [ ] Sepsis (in the previous month)                        (1 Point)                  [ ] History of pregnancy complications                 (1 point)  [ ] Pneumonia or serious lung disease                                               [ ] Unexplained or recurrent                     (1 Point)           (in the previous month)                               (1 Point)  [ ] Abnormal pulmonary function test                     (1 Point)                 SURGERY RELATED RISK FACTORS (include planned surgeries)  [ ] Acute myocardial infarction                              (1 Point)                 [ ]  Section                                             (1 Point)  [ ] Congestive heart failure (in the previous month)  (1 Point)               [ ] Minor surgery                                                  (1 Point)   [ ] Inflammatory bowel disease                             (1 Point)                 [ ] Arthroscopic surgery                                        (2 Points)  [ ] Central venous access                                      (2 Points)                [x ] General surgery lasting more than 45 minutes   (2 Points)       [ ] Stroke (in the previous month)                          (5 Points)               [ ] Elective arthroplasty                                         (5 Points)            [x ] current or past malignancy                              (2 Points)                                                                                                       HEMATOLOGY RELATED FACTORS                                                 TRAUMA RELATED RISK FACTORS  [ ] Prior episodes of VTE                                     (3 Points)                [ ] Fracture of the hip, pelvis, or leg                       (5 Points)  [ ] Positive family history for VTE                         (3 Points)                 [ ] Acute spinal cord injury (in the previous month)  (5 Points)  [ ] Prothrombin 53340 A                                     (3 Points)                 [ ] Paralysis  (less than 1 month)                             (5 Points)  [ ] Factor V Leiden                                             (3 Points)                  [ ] Multiple Trauma within 1 month                        (5 Points)  [ ] Lupus anticoagulants                                     (3 Points)                                                           [ ] Anticardiolipin antibodies                               (3 Points)                                                       [ ] High homocysteine in the blood                      (3 Points)                                             [ ] Other congenital or acquired thrombophilia      (3 Points)                                                [ ] Heparin induced thrombocytopenia                  (3 Points)   [ ] Previous Covid-19 confirmed infection             ( 3 Points)                                        Total Score [      6     ]    Risk:  Very low 0   Low 1 to 2   Moderate 3 to 4   High =5       VTE Prophylasix Recommednations:  [ x ] mechanical pneumatic compression devices                                      [ ] contraindicated due to: _____________________  [ ] chemo prophylasix                                                                                   [ x ] contraindicated due to: Preop for poss OR    **** MODERATE/HIGH LIKELIHOOD DVT PRESENT ON ADMISSION  [ x ] (please order LE dopplers within 24 hours of admission)

## 2022-01-24 NOTE — H&P ADULT - HISTORY OF PRESENT ILLNESS
66M hx HTN/DM was [place] the [place] early January, was confused and not answering appropriately, MRI Jan 7 showed L frontal enhancing mass with significant edema. Repeat CTH today showed significant vasogenic edema and 1.5cm MLS. Exam: Awake, Ox1, answering many questions inappropriately/perseverating, EOMI, R drift, JULIA rosas 66M hx HTN/DM was in the [facility] [place] for [place] early January, was confused and not answering appropriately, MRI Jan 7 showed L frontal enhancing mass with significant edema.  He presented to [facility] on 1/24/22 for further evaluation and repeat CTH showed significant vasogenic edema and 1.5cm MLS. He underwent 1/27/22 for left sided craniotomy for tumor resection with gleolan.  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 Post op course was complicated by possible seizures, but EEG negative. Hyponatermia with salt tabs added to regimen.  He was stabilized, evaluated by PMR and was accepted acute inpatient rehab. He was admitted to  Acute rehab this evening, 02/08/2022, however COVID test came back positive.  Last COVID test was from 02/04/22 and was negative.

## 2022-01-24 NOTE — ED ADULT NURSE NOTE - NSIMPLEMENTINTERV_GEN_ALL_ED
Implemented All Fall Risk Interventions:  [place] to call system. Call bell, personal items and telephone within reach. Instruct patient to call for assistance. Room bathroom lighting operational. Non-slip footwear when patient is off stretcher. Physically safe environment: no spills, clutter or unnecessary equipment. Stretcher in lowest position, wheels locked, appropriate side rails in place. Provide visual cue, wrist band, yellow gown, etc. Monitor gait and stability. Monitor for mental status changes and reorient to person, place, and time. Review medications for side effects contributing to fall risk. Reinforce activity limits and safety measures with patient and family.

## 2022-01-24 NOTE — H&P ADULT - ASSESSMENT
Osiel Norwood    66M hx HTN/DM [place] early January, was confused and not answering appropriately, MRI Jan 7 showed L frontal enhancing mass with significant edema. Repeat CTH today showed significant vasogenic edema and 1.5cm MLS. Exam: Awake, Ox1, answering many questions inappropriately/perseverating, EOMI, R drift, DUMONT strongly.   -Adm NSCU overnight  -Dex 4q6  -Keppra  -Repeat MRI wwo  -Would likely be good clinical trials candidate, will send for screening after new MR

## 2022-01-25 LAB
A1C WITH ESTIMATED AVERAGE GLUCOSE RESULT: 10.7 % — HIGH (ref 4–5.6)
ANION GAP SERPL CALC-SCNC: 10 MMOL/L — SIGNIFICANT CHANGE UP (ref 5–17)
ANION GAP SERPL CALC-SCNC: 12 MMOL/L — SIGNIFICANT CHANGE UP (ref 5–17)
ANION GAP SERPL CALC-SCNC: 13 MMOL/L — SIGNIFICANT CHANGE UP (ref 5–17)
BUN SERPL-MCNC: 12 MG/DL — SIGNIFICANT CHANGE UP (ref 7–23)
BUN SERPL-MCNC: 15 MG/DL — SIGNIFICANT CHANGE UP (ref 7–23)
BUN SERPL-MCNC: 16 MG/DL — SIGNIFICANT CHANGE UP (ref 7–23)
CALCIUM SERPL-MCNC: 9.1 MG/DL — SIGNIFICANT CHANGE UP (ref 8.4–10.5)
CALCIUM SERPL-MCNC: 9.3 MG/DL — SIGNIFICANT CHANGE UP (ref 8.4–10.5)
CALCIUM SERPL-MCNC: 9.3 MG/DL — SIGNIFICANT CHANGE UP (ref 8.4–10.5)
CHLORIDE SERPL-SCNC: 100 MMOL/L — SIGNIFICANT CHANGE UP (ref 96–108)
CHLORIDE SERPL-SCNC: 101 MMOL/L — SIGNIFICANT CHANGE UP (ref 96–108)
CHLORIDE SERPL-SCNC: 104 MMOL/L — SIGNIFICANT CHANGE UP (ref 96–108)
CO2 SERPL-SCNC: 20 MMOL/L — LOW (ref 22–31)
CO2 SERPL-SCNC: 22 MMOL/L — SIGNIFICANT CHANGE UP (ref 22–31)
CO2 SERPL-SCNC: 23 MMOL/L — SIGNIFICANT CHANGE UP (ref 22–31)
CREAT SERPL-MCNC: 0.78 MG/DL — SIGNIFICANT CHANGE UP (ref 0.5–1.3)
CREAT SERPL-MCNC: 0.89 MG/DL — SIGNIFICANT CHANGE UP (ref 0.5–1.3)
CREAT SERPL-MCNC: 0.93 MG/DL — SIGNIFICANT CHANGE UP (ref 0.5–1.3)
ESTIMATED AVERAGE GLUCOSE: 260 MG/DL — HIGH (ref 68–114)
GLUCOSE BLDC GLUCOMTR-MCNC: 198 MG/DL — HIGH (ref 70–99)
GLUCOSE BLDC GLUCOMTR-MCNC: 205 MG/DL — HIGH (ref 70–99)
GLUCOSE BLDC GLUCOMTR-MCNC: 235 MG/DL — HIGH (ref 70–99)
GLUCOSE BLDC GLUCOMTR-MCNC: 245 MG/DL — HIGH (ref 70–99)
GLUCOSE BLDC GLUCOMTR-MCNC: 275 MG/DL — HIGH (ref 70–99)
GLUCOSE SERPL-MCNC: 248 MG/DL — HIGH (ref 70–99)
GLUCOSE SERPL-MCNC: 286 MG/DL — HIGH (ref 70–99)
GLUCOSE SERPL-MCNC: 315 MG/DL — HIGH (ref 70–99)
MAGNESIUM SERPL-MCNC: 1.7 MG/DL — SIGNIFICANT CHANGE UP (ref 1.6–2.6)
MAGNESIUM SERPL-MCNC: 2.4 MG/DL — SIGNIFICANT CHANGE UP (ref 1.6–2.6)
PHOSPHATE SERPL-MCNC: 2.1 MG/DL — LOW (ref 2.5–4.5)
PHOSPHATE SERPL-MCNC: 2.6 MG/DL — SIGNIFICANT CHANGE UP (ref 2.5–4.5)
POTASSIUM SERPL-MCNC: 4 MMOL/L — SIGNIFICANT CHANGE UP (ref 3.5–5.3)
POTASSIUM SERPL-MCNC: 4.1 MMOL/L — SIGNIFICANT CHANGE UP (ref 3.5–5.3)
POTASSIUM SERPL-MCNC: 4.5 MMOL/L — SIGNIFICANT CHANGE UP (ref 3.5–5.3)
POTASSIUM SERPL-SCNC: 4 MMOL/L — SIGNIFICANT CHANGE UP (ref 3.5–5.3)
POTASSIUM SERPL-SCNC: 4.1 MMOL/L — SIGNIFICANT CHANGE UP (ref 3.5–5.3)
POTASSIUM SERPL-SCNC: 4.5 MMOL/L — SIGNIFICANT CHANGE UP (ref 3.5–5.3)
SODIUM SERPL-SCNC: 134 MMOL/L — LOW (ref 135–145)
SODIUM SERPL-SCNC: 135 MMOL/L — SIGNIFICANT CHANGE UP (ref 135–145)
SODIUM SERPL-SCNC: 136 MMOL/L — SIGNIFICANT CHANGE UP (ref 135–145)
T3 SERPL-MCNC: 81 NG/DL — SIGNIFICANT CHANGE UP (ref 80–200)
T4 AB SER-ACNC: 4.5 UG/DL — LOW (ref 4.6–12)
TSH SERPL-MCNC: 4.15 UIU/ML — SIGNIFICANT CHANGE UP (ref 0.27–4.2)

## 2022-01-25 PROCEDURE — 99291 CRITICAL CARE FIRST HOUR: CPT

## 2022-01-25 PROCEDURE — 70553 MRI BRAIN STEM W/O & W/DYE: CPT | Mod: 26

## 2022-01-25 RX ORDER — INSULIN LISPRO 100/ML
VIAL (ML) SUBCUTANEOUS
Refills: 0 | Status: DISCONTINUED | OUTPATIENT
Start: 2022-01-25 | End: 2022-01-27

## 2022-01-25 RX ORDER — DEXAMETHASONE 0.5 MG/5ML
6 ELIXIR ORAL ONCE
Refills: 0 | Status: COMPLETED | OUTPATIENT
Start: 2022-01-25 | End: 2022-01-25

## 2022-01-25 RX ORDER — MAGNESIUM SULFATE 500 MG/ML
2 VIAL (ML) INJECTION ONCE
Refills: 0 | Status: COMPLETED | OUTPATIENT
Start: 2022-01-25 | End: 2022-01-25

## 2022-01-25 RX ORDER — SODIUM CHLORIDE 5 G/100ML
1000 INJECTION, SOLUTION INTRAVENOUS
Refills: 0 | Status: DISCONTINUED | OUTPATIENT
Start: 2022-01-25 | End: 2022-01-27

## 2022-01-25 RX ORDER — POLYETHYLENE GLYCOL 3350 17 G/17G
17 POWDER, FOR SOLUTION ORAL EVERY 12 HOURS
Refills: 0 | Status: DISCONTINUED | OUTPATIENT
Start: 2022-01-25 | End: 2022-01-27

## 2022-01-25 RX ORDER — SENNA PLUS 8.6 MG/1
2 TABLET ORAL AT BEDTIME
Refills: 0 | Status: DISCONTINUED | OUTPATIENT
Start: 2022-01-25 | End: 2022-01-27

## 2022-01-25 RX ORDER — ENOXAPARIN SODIUM 100 MG/ML
40 INJECTION SUBCUTANEOUS
Refills: 0 | Status: DISCONTINUED | OUTPATIENT
Start: 2022-01-25 | End: 2022-01-26

## 2022-01-25 RX ORDER — SODIUM CHLORIDE 9 MG/ML
1000 INJECTION INTRAMUSCULAR; INTRAVENOUS; SUBCUTANEOUS
Refills: 0 | Status: DISCONTINUED | OUTPATIENT
Start: 2022-01-25 | End: 2022-01-25

## 2022-01-25 RX ORDER — INSULIN LISPRO 100/ML
VIAL (ML) SUBCUTANEOUS EVERY 6 HOURS
Refills: 0 | Status: DISCONTINUED | OUTPATIENT
Start: 2022-01-25 | End: 2022-01-25

## 2022-01-25 RX ORDER — LACOSAMIDE 50 MG/1
100 TABLET ORAL
Refills: 0 | Status: DISCONTINUED | OUTPATIENT
Start: 2022-01-25 | End: 2022-01-27

## 2022-01-25 RX ORDER — INSULIN LISPRO 100/ML
8 VIAL (ML) SUBCUTANEOUS
Refills: 0 | Status: DISCONTINUED | OUTPATIENT
Start: 2022-01-25 | End: 2022-01-26

## 2022-01-25 RX ORDER — INSULIN GLARGINE 100 [IU]/ML
10 INJECTION, SOLUTION SUBCUTANEOUS AT BEDTIME
Refills: 0 | Status: DISCONTINUED | OUTPATIENT
Start: 2022-01-25 | End: 2022-01-26

## 2022-01-25 RX ADMIN — Medication 6: at 11:42

## 2022-01-25 RX ADMIN — Medication 8 UNIT(S): at 11:41

## 2022-01-25 RX ADMIN — Medication 4: at 21:33

## 2022-01-25 RX ADMIN — Medication 4 MILLIGRAM(S): at 23:46

## 2022-01-25 RX ADMIN — SODIUM CHLORIDE 50 MILLILITER(S): 5 INJECTION, SOLUTION INTRAVENOUS at 03:01

## 2022-01-25 RX ADMIN — Medication 25 GRAM(S): at 19:25

## 2022-01-25 RX ADMIN — SODIUM CHLORIDE 75 MILLILITER(S): 9 INJECTION INTRAMUSCULAR; INTRAVENOUS; SUBCUTANEOUS at 01:31

## 2022-01-25 RX ADMIN — LACOSAMIDE 100 MILLIGRAM(S): 50 TABLET ORAL at 17:28

## 2022-01-25 RX ADMIN — Medication 4 MILLIGRAM(S): at 11:55

## 2022-01-25 RX ADMIN — PANTOPRAZOLE SODIUM 40 MILLIGRAM(S): 20 TABLET, DELAYED RELEASE ORAL at 08:07

## 2022-01-25 RX ADMIN — Medication 4 MILLIGRAM(S): at 05:04

## 2022-01-25 RX ADMIN — Medication 8 UNIT(S): at 17:16

## 2022-01-25 RX ADMIN — Medication 4: at 17:15

## 2022-01-25 RX ADMIN — INSULIN GLARGINE 10 UNIT(S): 100 INJECTION, SOLUTION SUBCUTANEOUS at 21:31

## 2022-01-25 RX ADMIN — SENNA PLUS 2 TABLET(S): 8.6 TABLET ORAL at 21:34

## 2022-01-25 RX ADMIN — Medication 4 MILLIGRAM(S): at 17:28

## 2022-01-25 RX ADMIN — ATORVASTATIN CALCIUM 20 MILLIGRAM(S): 80 TABLET, FILM COATED ORAL at 21:31

## 2022-01-25 RX ADMIN — ATORVASTATIN CALCIUM 20 MILLIGRAM(S): 80 TABLET, FILM COATED ORAL at 00:36

## 2022-01-25 RX ADMIN — SODIUM CHLORIDE 50 MILLILITER(S): 5 INJECTION, SOLUTION INTRAVENOUS at 19:25

## 2022-01-25 RX ADMIN — CHLORHEXIDINE GLUCONATE 1 APPLICATION(S): 213 SOLUTION TOPICAL at 12:53

## 2022-01-25 RX ADMIN — Medication 2: at 05:04

## 2022-01-25 RX ADMIN — Medication 62.5 MILLIMOLE(S): at 21:30

## 2022-01-25 RX ADMIN — Medication 4 MILLIGRAM(S): at 00:36

## 2022-01-25 RX ADMIN — Medication 6 MILLIGRAM(S): at 02:52

## 2022-01-25 RX ADMIN — ENOXAPARIN SODIUM 40 MILLIGRAM(S): 100 INJECTION SUBCUTANEOUS at 17:28

## 2022-01-25 RX ADMIN — SODIUM CHLORIDE 50 MILLILITER(S): 5 INJECTION, SOLUTION INTRAVENOUS at 17:29

## 2022-01-25 RX ADMIN — LEVETIRACETAM 500 MILLIGRAM(S): 250 TABLET, FILM COATED ORAL at 05:04

## 2022-01-25 RX ADMIN — POLYETHYLENE GLYCOL 3350 17 GRAM(S): 17 POWDER, FOR SOLUTION ORAL at 17:28

## 2022-01-25 RX ADMIN — Medication 4: at 00:58

## 2022-01-25 NOTE — PROGRESS NOTE ADULT - SUBJECTIVE AND OBJECTIVE BOX
NSCU Progress Note    Assessment/Hospital Course:    66M hx HTN/DM [place] early January, was confused and not answering appropriately, MRI Jan 7 showed L frontal enhancing mass with significant edema. Repeat CTH showed significant vasogenic edema and 1.5cm MLS    24 Hour Events/Subjective:  - started on HTS for cerebral edema      REVIEW OF SYSTEMS:  - negative except as above    VITALS:   - T(C): 36.6 (01-25-22 @ 08:00), Max: 36.9 (01-24-22 @ 18:41)  T(F): 97.9 (01-25-22 @ 08:00), Max: 98.5 (01-24-22 @ 23:15)  HR: 62 (01-25-22 @ 09:00) (56 - 80)  BP: 107/72 (01-25-22 @ 09:00) (95/56 - 127/79)  ABP: --  ABP(mean): --  RR: 16 (01-25-22 @ 09:00) (13 - 21)  SpO2: 93% (01-25-22 @ 09:00) (92% - 100%)      IMAGING/DATA:   - Reviewed          PHYSICAL EXAM:    General: calm  CVS: RRR  Pulm: CTAB  GI: Soft, NTND  Extremities: No LE Edema  Neuro: AOx1 (person), perseverating, frontal behavior, PERRL, EOMI, R facial, motor 5/5 throughout except UE 4+/5, NSCU Progress Note    Assessment/Hospital Course:    66M hx HTN/DM [place] early [place], was confused and not answering appropriately, MRI Jan 7 showed L frontal enhancing mass with significant edema. Repeat CTH showed significant vasogenic edema and 1.5cm MLS    24 Hour Events/Subjective:  - started on HTS for cerebral edema    VITALS:   - T(C): 36.6 (01-25-22 @ 08:00), Max: 36.9 (01-24-22 @ 18:41)  T(F): 97.9 (01-25-22 @ 08:00), Max: 98.5 (01-24-22 @ 23:15)  HR: 62 (01-25-22 @ 09:00) (56 - 80)  BP: 107/72 (01-25-22 @ 09:00) (95/56 - 127/79)  ABP: --  ABP(mean): --  RR: 16 (01-25-22 @ 09:00) (13 - 21)  SpO2: 93% (01-25-22 @ 09:00) (92% - 100%)      IMAGING/DATA:   - Reviewed          PHYSICAL EXAM:    General: calm  CVS: RRR  Pulm: CTAB  GI: Soft, NTND  Extremities: No LE Edema  Neuro: AOx1 (person), perseverating, frontal behavior, PERRL, EOMI, R facial, motor 5/5 throughout except UE 4+/5,

## 2022-01-25 NOTE — OCCUPATIONAL THERAPY INITIAL EVALUATION ADULT - ADDITIONAL COMMENTS
CT Head (1/24): Extensive lesion and surrounding vasogenic edema in left frontal and temporal lobes with involvement of the corpus callosum and medial left frontal lobe with marked ventricular effacement and 1.4 cm left to right midline shift.  MRI (1/26): Large inferior frontal intra-axial irregularly ring-enhancing mass with mass effect and vasogenic edema suspicious for high-grade glioma. Midline shift to the right and mild hydrocephalus  CT Head (1/27): No significant change from one hour prior. Postop changes in the left frontal lobe. Mass effect, midline shift and subfalcine herniation remain.

## 2022-01-25 NOTE — PROGRESS NOTE ADULT - ASSESSMENT
Osiel Norwood    66M hx HTN/DM was [place] the [place] for vacation early January, was confused and not answering appropriately, MRI Jan 7 showed L frontal enhancing mass with significant edema. Repeat CTH today showed significant vasogenic edema and 1.5cm MLS. Exam: Awake, Ox1, answering many questions inappropriately/perseverating, EOMI, R drift, DUMONT strongly.   -Adm NSCU overnight  -Dex 4q6  -Keppra  -Repeat MRI wwo  -Would likely be good clinical trials candidate, will send for screening after new MR

## 2022-01-25 NOTE — PROGRESS NOTE ADULT - SUBJECTIVE AND OBJECTIVE BOX
Patient seen and examined at bedside.    --Anticoagulation--    T(C): 36.9 (01-24-22 @ 23:15), Max: 36.9 (01-24-22 @ 18:41)  HR: 67 (01-25-22 @ 01:00) (63 - 80)  BP: 103/75 (01-25-22 @ 01:00) (103/75 - 127/79)  RR: 18 (01-25-22 @ 01:00) (14 - 21)  SpO2: 100% (01-25-22 @ 01:00) (92% - 100%)  Wt(kg): --    Exam:   Awake, Ox1, answering many questions inappropriately/perseverating, EOMI, R drift, DUMONT strongly.

## 2022-01-25 NOTE — OCCUPATIONAL THERAPY INITIAL EVALUATION ADULT - PERTINENT HX OF CURRENT PROBLEM, REHAB EVAL
66M hx HTN/DM was [place] early January, was confused and not answering appropriately, MRI Jan 7 showed L frontal enhancing mass with significant edema. Repeat CTH today showed significant vasogenic edema and 1.5cm MLS. Exam: Awake, Ox1, answering many questions inappropriately/perseverating 66M hx HTN/DM was in the [facility] [place] for vacation early January, was confused and not answering appropriately, MRI Jan 7 showed L frontal enhancing mass with significant edema. Repeat CTH today showed significant vasogenic edema and 1.5cm MLS. Pt now s/p L craniotomy for resection of brain tumor on 1/27/22.

## 2022-01-25 NOTE — OCCUPATIONAL THERAPY INITIAL EVALUATION ADULT - LEVEL OF INDEPENDENCE: SIT/STAND, REHAB EVAL
NT-pt requesting to return to supine due to dizziness minimum assist (75% patients effort)/moderate assist (50% patients effort)

## 2022-01-25 NOTE — PROGRESS NOTE ADULT - SUBJECTIVE AND OBJECTIVE BOX
NSCU ATTENDING -- ADDITIONAL PROGRESS NOTE    Nighttime rounds were performed -- please refer to earlier Progress Note for HPI details.    T(C): 36.7 (01-25-22 @ 23:00), Max: 36.8 (01-25-22 @ 11:00)  HR: 56 (01-26-22 @ 02:00) (56 - 74)  BP: 110/68 (01-26-22 @ 02:00) (95/56 - 122/86)  RR: 14 (01-26-22 @ 02:00) (13 - 20)  SpO2: 94% (01-26-22 @ 02:00) (93% - 100%)  Wt(kg): --    Relevant labwork and imaging reviewed.    Patient remains critically ill.    [A/P]  MRI with medication tomorrow  continue decadron for cerebral edema  2% at 50cc/hr for Na goal 140-150  OR plan for 1/27

## 2022-01-25 NOTE — PROGRESS NOTE ADULT - ASSESSMENT
ASSESSMENT/PLAN:    66M presenting with brain mass with significant vasogenic edema with brain compression and midline shift     NEURO:  - neuro checks q2h  - MRI WWO  - dex 4q6h for vasogenic edema and HTS per below  - Vimpat 100mg BID for sz ppx (transitioned from keppra for behavior)    CVS:  - SBP goal 100-160  - lisinopril    PULM:  - RA  - IS As tolerated  - Aspiration precautions    RENAL:  - Fluids: HTS 2%@50  - na goal 140-150  - daily IOs  - BMP q8h    GI:  - Diet: ADAT, NPO MN for possible intervention  - GI prophylaxis: PPPi while on CCS  - Bowel regimen: miralax, senna    ENDO:   - FS goal 120-180  - lantus 10uqhs  - lospro 8u TID     HEME/ONC:  - SCDs  - Chemoppx: SQL    ID:  - monitor for fevers      Patient is at high risk of neurologic deterioration/death due to:  cerebral edema, brain compression, seizures      Time spent: 35 critical care minutes ASSESSMENT/PLAN:    66M presenting with brain mass with significant vasogenic edema with brain compression and midline shift     NEURO:  - neuro checks q2h  - MRI WWO  - dex 4q6h for vasogenic edema and HTS per below  - Vimpat 100mg BID for sz ppx (transitioned from keppra for behavior)    CVS:  - SBP goal 100-160  - lisinopril    PULM:  - RA  - IS As tolerated  - Aspiration precautions    RENAL:  - Fluids: HTS 2%@50  - na goal 140-150  - daily IOs  - BMP q8h    GI:  - Diet: ADAT, NPO MN for possible intervention  - GI prophylaxis: PPPi while on CCS  - Bowel regimen: miralax, senna    ENDO:   - FS goal 120-180  - lantus 10uqhs  - lospro 8u TID     HEME/ONC:  - SCDs  - Chemoppx: SQL    ID:  - monitor for fevers      Patient is at high risk of neurologic deterioration/death due to:  cerebral edema, brain compression, seizures

## 2022-01-26 ENCOUNTER — TRANSCRIPTION ENCOUNTER (OUTPATIENT)
Age: 67
End: 2022-01-26

## 2022-01-26 LAB
ANION GAP SERPL CALC-SCNC: 13 MMOL/L — SIGNIFICANT CHANGE UP (ref 5–17)
ANION GAP SERPL CALC-SCNC: 14 MMOL/L — SIGNIFICANT CHANGE UP (ref 5–17)
ANION GAP SERPL CALC-SCNC: 14 MMOL/L — SIGNIFICANT CHANGE UP (ref 5–17)
ANION GAP SERPL CALC-SCNC: 16 MMOL/L — SIGNIFICANT CHANGE UP (ref 5–17)
APTT BLD: 23.5 SEC — LOW (ref 27.5–35.5)
BLD GP AB SCN SERPL QL: NEGATIVE — SIGNIFICANT CHANGE UP
BUN SERPL-MCNC: 15 MG/DL — SIGNIFICANT CHANGE UP (ref 7–23)
BUN SERPL-MCNC: 15 MG/DL — SIGNIFICANT CHANGE UP (ref 7–23)
BUN SERPL-MCNC: 18 MG/DL — SIGNIFICANT CHANGE UP (ref 7–23)
BUN SERPL-MCNC: 19 MG/DL — SIGNIFICANT CHANGE UP (ref 7–23)
CALCIUM SERPL-MCNC: 8.8 MG/DL — SIGNIFICANT CHANGE UP (ref 8.4–10.5)
CALCIUM SERPL-MCNC: 8.8 MG/DL — SIGNIFICANT CHANGE UP (ref 8.4–10.5)
CALCIUM SERPL-MCNC: 9.2 MG/DL — SIGNIFICANT CHANGE UP (ref 8.4–10.5)
CALCIUM SERPL-MCNC: 9.2 MG/DL — SIGNIFICANT CHANGE UP (ref 8.4–10.5)
CHLORIDE SERPL-SCNC: 105 MMOL/L — SIGNIFICANT CHANGE UP (ref 96–108)
CHLORIDE SERPL-SCNC: 106 MMOL/L — SIGNIFICANT CHANGE UP (ref 96–108)
CHLORIDE SERPL-SCNC: 107 MMOL/L — SIGNIFICANT CHANGE UP (ref 96–108)
CHLORIDE SERPL-SCNC: 107 MMOL/L — SIGNIFICANT CHANGE UP (ref 96–108)
CO2 SERPL-SCNC: 16 MMOL/L — LOW (ref 22–31)
CO2 SERPL-SCNC: 19 MMOL/L — LOW (ref 22–31)
CO2 SERPL-SCNC: 19 MMOL/L — LOW (ref 22–31)
CO2 SERPL-SCNC: 20 MMOL/L — LOW (ref 22–31)
CREAT SERPL-MCNC: 0.8 MG/DL — SIGNIFICANT CHANGE UP (ref 0.5–1.3)
CREAT SERPL-MCNC: 0.8 MG/DL — SIGNIFICANT CHANGE UP (ref 0.5–1.3)
CREAT SERPL-MCNC: 0.83 MG/DL — SIGNIFICANT CHANGE UP (ref 0.5–1.3)
CREAT SERPL-MCNC: 0.87 MG/DL — SIGNIFICANT CHANGE UP (ref 0.5–1.3)
GLUCOSE BLDC GLUCOMTR-MCNC: 248 MG/DL — HIGH (ref 70–99)
GLUCOSE BLDC GLUCOMTR-MCNC: 252 MG/DL — HIGH (ref 70–99)
GLUCOSE BLDC GLUCOMTR-MCNC: 258 MG/DL — HIGH (ref 70–99)
GLUCOSE BLDC GLUCOMTR-MCNC: 271 MG/DL — HIGH (ref 70–99)
GLUCOSE BLDC GLUCOMTR-MCNC: 301 MG/DL — HIGH (ref 70–99)
GLUCOSE SERPL-MCNC: 205 MG/DL — HIGH (ref 70–99)
GLUCOSE SERPL-MCNC: 284 MG/DL — HIGH (ref 70–99)
GLUCOSE SERPL-MCNC: 286 MG/DL — HIGH (ref 70–99)
GLUCOSE SERPL-MCNC: 349 MG/DL — HIGH (ref 70–99)
HCT VFR BLD CALC: 43 % — SIGNIFICANT CHANGE UP (ref 39–50)
HGB BLD-MCNC: 14.4 G/DL — SIGNIFICANT CHANGE UP (ref 13–17)
INR BLD: 1.09 RATIO — SIGNIFICANT CHANGE UP (ref 0.88–1.16)
MAGNESIUM SERPL-MCNC: 1.8 MG/DL — SIGNIFICANT CHANGE UP (ref 1.6–2.6)
MAGNESIUM SERPL-MCNC: 2.4 MG/DL — SIGNIFICANT CHANGE UP (ref 1.6–2.6)
MCHC RBC-ENTMCNC: 30.8 PG — SIGNIFICANT CHANGE UP (ref 27–34)
MCHC RBC-ENTMCNC: 33.5 GM/DL — SIGNIFICANT CHANGE UP (ref 32–36)
MCV RBC AUTO: 92.1 FL — SIGNIFICANT CHANGE UP (ref 80–100)
NRBC # BLD: 0 /100 WBCS — SIGNIFICANT CHANGE UP (ref 0–0)
PHOSPHATE SERPL-MCNC: 2.4 MG/DL — LOW (ref 2.5–4.5)
PHOSPHATE SERPL-MCNC: 3.3 MG/DL — SIGNIFICANT CHANGE UP (ref 2.5–4.5)
PHOSPHATE SERPL-MCNC: 3.9 MG/DL — SIGNIFICANT CHANGE UP (ref 2.5–4.5)
PLATELET # BLD AUTO: 108 K/UL — LOW (ref 150–400)
POTASSIUM SERPL-MCNC: 3.9 MMOL/L — SIGNIFICANT CHANGE UP (ref 3.5–5.3)
POTASSIUM SERPL-MCNC: 4 MMOL/L — SIGNIFICANT CHANGE UP (ref 3.5–5.3)
POTASSIUM SERPL-MCNC: 4.1 MMOL/L — SIGNIFICANT CHANGE UP (ref 3.5–5.3)
POTASSIUM SERPL-MCNC: 4.6 MMOL/L — SIGNIFICANT CHANGE UP (ref 3.5–5.3)
POTASSIUM SERPL-SCNC: 3.9 MMOL/L — SIGNIFICANT CHANGE UP (ref 3.5–5.3)
POTASSIUM SERPL-SCNC: 4 MMOL/L — SIGNIFICANT CHANGE UP (ref 3.5–5.3)
POTASSIUM SERPL-SCNC: 4.1 MMOL/L — SIGNIFICANT CHANGE UP (ref 3.5–5.3)
POTASSIUM SERPL-SCNC: 4.6 MMOL/L — SIGNIFICANT CHANGE UP (ref 3.5–5.3)
PROTHROM AB SERPL-ACNC: 13 SEC — SIGNIFICANT CHANGE UP (ref 10.6–13.6)
RBC # BLD: 4.67 M/UL — SIGNIFICANT CHANGE UP (ref 4.2–5.8)
RBC # FLD: 11.4 % — SIGNIFICANT CHANGE UP (ref 10.3–14.5)
RH IG SCN BLD-IMP: POSITIVE — SIGNIFICANT CHANGE UP
SODIUM SERPL-SCNC: 137 MMOL/L — SIGNIFICANT CHANGE UP (ref 135–145)
SODIUM SERPL-SCNC: 139 MMOL/L — SIGNIFICANT CHANGE UP (ref 135–145)
SODIUM SERPL-SCNC: 140 MMOL/L — SIGNIFICANT CHANGE UP (ref 135–145)
SODIUM SERPL-SCNC: 140 MMOL/L — SIGNIFICANT CHANGE UP (ref 135–145)
WBC # BLD: 16.26 K/UL — HIGH (ref 3.8–10.5)
WBC # FLD AUTO: 16.26 K/UL — HIGH (ref 3.8–10.5)

## 2022-01-26 PROCEDURE — 93970 EXTREMITY STUDY: CPT | Mod: 26

## 2022-01-26 PROCEDURE — 99222 1ST HOSP IP/OBS MODERATE 55: CPT

## 2022-01-26 PROCEDURE — 99291 CRITICAL CARE FIRST HOUR: CPT

## 2022-01-26 PROCEDURE — 70552 MRI BRAIN STEM W/DYE: CPT | Mod: 26

## 2022-01-26 RX ORDER — INSULIN GLARGINE 100 [IU]/ML
20 INJECTION, SOLUTION SUBCUTANEOUS AT BEDTIME
Refills: 0 | Status: DISCONTINUED | OUTPATIENT
Start: 2022-01-26 | End: 2022-01-27

## 2022-01-26 RX ORDER — INSULIN LISPRO 100/ML
12 VIAL (ML) SUBCUTANEOUS
Refills: 0 | Status: DISCONTINUED | OUTPATIENT
Start: 2022-01-26 | End: 2022-01-27

## 2022-01-26 RX ORDER — INSULIN LISPRO 100/ML
12 VIAL (ML) SUBCUTANEOUS ONCE
Refills: 0 | Status: COMPLETED | OUTPATIENT
Start: 2022-01-26 | End: 2022-01-26

## 2022-01-26 RX ORDER — POTASSIUM PHOSPHATE, MONOBASIC POTASSIUM PHOSPHATE, DIBASIC 236; 224 MG/ML; MG/ML
15 INJECTION, SOLUTION INTRAVENOUS ONCE
Refills: 0 | Status: DISCONTINUED | OUTPATIENT
Start: 2022-01-26 | End: 2022-01-26

## 2022-01-26 RX ORDER — AMINOLEVULINIC ACID HYDROCHLORIDE 1500 MG/1
1560 POWDER, FOR SOLUTION ORAL ONCE
Refills: 0 | Status: COMPLETED | OUTPATIENT
Start: 2022-01-27 | End: 2022-01-27

## 2022-01-26 RX ORDER — MAGNESIUM SULFATE 500 MG/ML
2 VIAL (ML) INJECTION ONCE
Refills: 0 | Status: COMPLETED | OUTPATIENT
Start: 2022-01-26 | End: 2022-01-26

## 2022-01-26 RX ORDER — DIAZEPAM 5 MG
5 TABLET ORAL ONCE
Refills: 0 | Status: DISCONTINUED | OUTPATIENT
Start: 2022-01-26 | End: 2022-01-26

## 2022-01-26 RX ORDER — ENOXAPARIN SODIUM 100 MG/ML
40 INJECTION SUBCUTANEOUS ONCE
Refills: 0 | Status: DISCONTINUED | OUTPATIENT
Start: 2022-01-26 | End: 2022-01-26

## 2022-01-26 RX ADMIN — Medication 12 UNIT(S): at 12:00

## 2022-01-26 RX ADMIN — Medication 650 MILLIGRAM(S): at 19:13

## 2022-01-26 RX ADMIN — PANTOPRAZOLE SODIUM 40 MILLIGRAM(S): 20 TABLET, DELAYED RELEASE ORAL at 08:53

## 2022-01-26 RX ADMIN — POLYETHYLENE GLYCOL 3350 17 GRAM(S): 17 POWDER, FOR SOLUTION ORAL at 05:31

## 2022-01-26 RX ADMIN — Medication 25 GRAM(S): at 17:36

## 2022-01-26 RX ADMIN — LACOSAMIDE 100 MILLIGRAM(S): 50 TABLET ORAL at 05:31

## 2022-01-26 RX ADMIN — Medication 4 MILLIGRAM(S): at 05:31

## 2022-01-26 RX ADMIN — Medication 6: at 17:13

## 2022-01-26 RX ADMIN — Medication 4: at 08:49

## 2022-01-26 RX ADMIN — Medication 85 MILLIMOLE(S): at 18:06

## 2022-01-26 RX ADMIN — Medication 12 UNIT(S): at 17:13

## 2022-01-26 RX ADMIN — Medication 4 MILLIGRAM(S): at 23:57

## 2022-01-26 RX ADMIN — Medication 4 MILLIGRAM(S): at 12:26

## 2022-01-26 RX ADMIN — LACOSAMIDE 100 MILLIGRAM(S): 50 TABLET ORAL at 17:36

## 2022-01-26 RX ADMIN — CHLORHEXIDINE GLUCONATE 1 APPLICATION(S): 213 SOLUTION TOPICAL at 12:39

## 2022-01-26 RX ADMIN — SENNA PLUS 2 TABLET(S): 8.6 TABLET ORAL at 22:04

## 2022-01-26 RX ADMIN — INSULIN GLARGINE 20 UNIT(S): 100 INJECTION, SOLUTION SUBCUTANEOUS at 22:09

## 2022-01-26 RX ADMIN — Medication 650 MILLIGRAM(S): at 19:58

## 2022-01-26 RX ADMIN — Medication 4 MILLIGRAM(S): at 17:35

## 2022-01-26 RX ADMIN — Medication 6: at 22:04

## 2022-01-26 RX ADMIN — SODIUM CHLORIDE 50 MILLILITER(S): 5 INJECTION, SOLUTION INTRAVENOUS at 19:13

## 2022-01-26 RX ADMIN — POLYETHYLENE GLYCOL 3350 17 GRAM(S): 17 POWDER, FOR SOLUTION ORAL at 17:36

## 2022-01-26 RX ADMIN — ATORVASTATIN CALCIUM 20 MILLIGRAM(S): 80 TABLET, FILM COATED ORAL at 22:05

## 2022-01-26 RX ADMIN — LISINOPRIL 2.5 MILLIGRAM(S): 2.5 TABLET ORAL at 05:32

## 2022-01-26 RX ADMIN — Medication 8 UNIT(S): at 08:49

## 2022-01-26 RX ADMIN — Medication 6: at 12:27

## 2022-01-26 RX ADMIN — Medication 5 MILLIGRAM(S): at 14:02

## 2022-01-26 NOTE — DIETITIAN INITIAL EVALUATION ADULT. - CHIEF COMPLAINT
The patient is a 67 yo M with PMH: HTN/DM. Admitted with confusion and not answering appropriately. MRI showed L frontal enhancing mass with significant edema. Repeat CTH showed significant vasogenic edema with brain compression and 1.5cm MLS. Continues on Decadron for vasogenic edema; on hypertonic saline as noted with Na goal 140-150. Plan for OR 1/27.

## 2022-01-26 NOTE — DIETITIAN INITIAL EVALUATION ADULT. - SIGNS/SYMPTOMS
A1c 10.7% pt with L enhancing mass with significant edema; planned OR (1/27) for neurosurgical intervention

## 2022-01-26 NOTE — PROGRESS NOTE ADULT - ASSESSMENT
ASSESSMENT/PLAN:    66M presenting with brain mass with significant vasogenic edema with brain compression and midline shift     NEURO:  - neuro checks q2h  - MRI WWO  - dex 4q6h for vasogenic edema and HTS per below  - Vimpat 100mg BID for sz ppx (transitioned from keppra for behavior)    CVS:  - SBP goal 100-160  - lisinopril    PULM:  - RA  - IS As tolerated  - Aspiration precautions    RENAL:  - Fluids: HTS 2%@50  - na goal 140-150  - daily IOs  - BMP q8h    GI:  - Diet: ADAT, NPO MN for possible intervention  - GI prophylaxis: PPPi while on CCS  - Bowel regimen: miralax, senna    ENDO:   - FS goal 120-180  - lantus 10uqhs  - lospro 8u TID     HEME/ONC:  - SCDs  - Chemoppx: SQL    ID:  - monitor for fevers      Patient is at high risk of neurologic deterioration/death due to:  cerebral edema, brain compression, seizures     ASSESSMENT/PLAN:    66M presenting with brain mass with significant vasogenic edema with brain compression and midline shift     NEURO:  - neuro checks q2h  - MRI WWO  - dex 4q6h for vasogenic edema and HTS per below  - Vimpat 100mg BID for sz ppx (transitioned from keppra for behavior)  - PT/OT as tolerated    CVS:  - SBP goal 100-160  - lisinopril    PULM:  - RA  - IS As tolerated  - Aspiration precautions    RENAL:  - Fluids: HTS 2%@50  - na goal 140-150  - daily IOs  - BMP q8h    GI:  - Diet: ADAT, NPO MN for OR TThurs  - GI prophylaxis: PPi while on CCS  - Bowel regimen: miralax, senna    ENDO:   - FS goal 120-180  - lantus 10uqhs, readjust dose after OR tomorrow since will be NPO tonight  - lispro 8u TID     HEME/ONC:  - SCDs  - Chemoppx: SQL, hold dose before OR tonight    ID:  - monitor for fevers      Patient is at high risk of neurologic deterioration/death due to:  cerebral edema, brain compression, seizures     ASSESSMENT/PLAN:    66M presenting with brain mass with significant vasogenic edema with brain compression and midline shift     NEURO:  - neuro checks q2h  - MRI WWO  - dex 4q6h for vasogenic edema and HTS per below  - Vimpat 100mg BID for sz ppx (transitioned from keppra for behavior)  - PT/OT as tolerated    CVS:  - SBP goal 100-160  - lisinopril    PULM:  - RA  - IS As tolerated  - Aspiration precautions    RENAL:  - Fluids: HTS 2%@50  - na goal 140-150  - daily IOs  - BMP q8h    GI:  - Diet: ADAT, NPO MN for OR TThurs  - GI prophylaxis: PPi while on CCS  - Bowel regimen: miralax, senna    ENDO:   - FS goal 120-180  - lantus 10uqhs, readjust dose after OR tomorrow since will be NPO tonight  - lispro 8u TID     HEME/ONC:  - SCDs  - Chemoppx: SQL; hold kwaku-OR    ID:  - monitor for fevers      Patient is at high risk of neurologic deterioration/death due to:  cerebral edema, brain compression, seizures

## 2022-01-26 NOTE — DIETITIAN INITIAL EVALUATION ADULT. - ENERGY INTAKE
pt reports good appetite - will continue to monitor. Plan for NPO after midnight for planned OR (1/27).

## 2022-01-26 NOTE — DIETITIAN INITIAL EVALUATION ADULT. - ORAL INTAKE PTA/DIET HISTORY
With aid of [place] , pt able to participate in nutrition evaluation (ID# [id number]). Pt reports good With aid of [place] , pt able to participate in nutrition evaluation (ID# [id number]). Pt reports good appetite PTA; consumes three meals daily with no specific dietary preferences noted. Denies food allergies. Denies intake of vitamins/supplements PTA. Denies N/V/C/diarrhea.

## 2022-01-26 NOTE — DIETITIAN INITIAL EVALUATION ADULT. - PERTINENT MEDS FT
Hibiclens, NaCl 2%, Lantus, Insulin Lispro, Vimpat, Miralax, Senna, Lipitor, Dulcolax, Decadron, Zestril, Protonix

## 2022-01-26 NOTE — PROGRESS NOTE ADULT - SUBJECTIVE AND OBJECTIVE BOX
The patient is a 66-yo male who was in his usual state of health until earlier this month (Jan 2022) when he was noted to be progressively disoriented and confused. At that time, the patient was in the [place] where he completed an MRI showing a large enhancing left frontal mass with edema. He was subsequently brought back to [place], his place of residence and presented to the [facility] ER on Jan 24. On initial exam, the patient was only oriented to self, answering questions inappropriately and perseverating with a RUE pronator drive and no focal deficit. CTH in the ER confirmed a large left frontal lesion with significant vasogenic edema and midline shift. 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 On exam today, the patient is awake and perseverating, intermittently oriented to self, following simple commands, moving all extremities strong symmetrically with RUE pronator drive. He has been on keppra and decadron 4q6 since admission. While the patient cannot provide history, collateral was obtained from the patient's adult daughter Addie. The patient is , has 3 children, and lives alone in a house in [facility] where the patient's brother also resides. The patient is a retired  and is very close to his family. The plan is surgical gross total resection of the lesion. The patient's daughter present at bedside voiced understanding of the indications, risks, and alternatives of surgical resection and consented to the procedure as the HCP of her father. Please keep patient NPO overnight, gleolan to be administered at 5:30 AM, and continue decadron and keppra.

## 2022-01-26 NOTE — DIETITIAN INITIAL EVALUATION ADULT. - OTHER INFO
Pt currently prescribed a consistent carbohydrate diet. Reports good intake of meal last night (1/25 - first meal received); awaiting arrival of breakfast meal (1/26) at time of RD visit. A1c 10.7%, indicating poor glycemic control PTA. Pt unable to report if he typically adheres to therapeutic diet and/or antihyperglycemic regimen - ? confusion/disorientation noted. Currently prescribed Lantus (10u at bedtime), Admelog (8u TID) and ISS sliding scale to aid in management of BG. To note, pt prescribed Decadron, posing pt at an additional risk for elevated glucose. Continues on hypertonic saline (2%) for cerebral edema.     Dosing wt (1/24): 171.8 lbs.   Pt reports UBW ~123.2 lbs ("56kg") and reports no hx of wt changes.   Wt discrepancy noted. Based on visual observation, pt more likely closer to dosing wt. To note, pt with ? confusion/disorientation. Will continue to trend; re-weight needed to verify.    Skin: no pressure injuries noted  Edema: none noted  GI: no documented BM's recorded thus far. On bowel regimen (senna, Miralax, Dulcolax) as ordered.

## 2022-01-26 NOTE — CHART NOTE - NSCHARTNOTEFT_GEN_A_CORE
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 Repeat MRI with thin slice for stereotaxis redemonstrates large left frontal, intra-axial heterogenously enhancing mass with 1.4cm MLS, perilesional edema in left frontal, temporal lobes with involvement of corpus callosum.   Neuro exam: Afghan speaking A&Ox1 (name), perseverates, follows simple commands. PERRL, EOMI, mild Right facial droop. LUE 4+/5 flexion, otherwise 5/5 throughout.   Lab: Hgb 15.7, platelet 140, coagulation profile wnl, Na 139 on 2% NS at 50cc/hr, glucose 200s.    The daughter is at bedside and consented for father for Left frontal stereotactic craniotomy for resection of mass. Attending surgeon Dr. Gupta. Postop disposition NSCU. He will require neuro-oncology, radiation oncology referral pending pathological diagnosis. He lives alone in a home with stairs and will require rehab following discharge from hospital.

## 2022-01-26 NOTE — PROGRESS NOTE ADULT - SUBJECTIVE AND OBJECTIVE BOX
Patient seen and examined at bedside.    --Anticoagulation--  enoxaparin Injectable 40 milliGRAM(s) SubCutaneous <User Schedule>    T(C): 36.7 (01-25-22 @ 23:00), Max: 36.8 (01-25-22 @ 11:00)  HR: 56 (01-26-22 @ 01:00) (56 - 74)  BP: 103/62 (01-26-22 @ 01:00) (95/56 - 122/86)  RR: 15 (01-26-22 @ 01:00) (13 - 20)  SpO2: 95% (01-26-22 @ 01:00) (93% - 100%)  Wt(kg): --    Exam:  Awake, expressive aphasia Ox0, perseverates. JULIA JIN strongly but has a R drift

## 2022-01-26 NOTE — PROGRESS NOTE ADULT - SUBJECTIVE AND OBJECTIVE BOX
NSCU Progress Note    Assessment/Hospital Course:    66M hx HTN/DM was [place] the [place] for vacation early January, was confused and not answering appropriately, MRI Jan 7 showed L frontal enhancing mass with significant edema. Repeat CTH showed significant vasogenic edema and 1.5cm MLS    24 Hour Events/Subjective:  - started on HTS for cerebral edema    VITALS:   - T(C): 36.6 (01-25-22 @ 08:00), Max: 36.9 (01-24-22 @ 18:41)  T(F): 97.9 (01-25-22 @ 08:00), Max: 98.5 (01-24-22 @ 23:15)  HR: 62 (01-25-22 @ 09:00) (56 - 80)  BP: 107/72 (01-25-22 @ 09:00) (95/56 - 127/79)  ABP: --  ABP(mean): --  RR: 16 (01-25-22 @ 09:00) (13 - 21)  SpO2: 93% (01-25-22 @ 09:00) (92% - 100%)      IMAGING/DATA:   - Reviewed          PHYSICAL EXAM:    General: calm  CVS: RRR  Pulm: CTAB  GI: Soft, NTND  Extremities: No LE Edema  Neuro: AOx1 (person), perseverating, frontal behavior, PERRL, EOMI, R facial, motor 5/5 throughout except UE 4+/5, NSCU Progress Note    Assessment/Hospital Course:    66M hx HTN/DM was [place] for [place] early January, was confused and not answering appropriately, MRI Jan 7 showed L frontal enhancing mass with significant edema. Repeat CTH showed significant vasogenic edema and 1.5cm MLS    24 Hour Events/Subjective:  - started on HTS for cerebral edema    VITALS:   -Reviewed      IMAGING/DATA:   - Reviewed      PHYSICAL EXAM:    General: calm  CVS: RRR  Pulm: CTAB  GI: Soft, NTND  Extremities: No LE Edema  Neuro: more awake today, Ox1 (person), perseverating, PERRL, EOMI, R facial, motor 5/5 throughout NSCU Progress Note    Assessment/Hospital Course:    66M hx HTN/DM was [place] the [place] early January, was confused and not answering appropriately, MRI Jan 7 showed L frontal enhancing mass with significant edema. Repeat CTH showed significant vasogenic edema and 1.5cm MLS    24 Hour Events/Subjective:  - started on HTS for cerebral edema    VITALS:   -Reviewed      IMAGING/DATA:   - Reviewed      PHYSICAL EXAM:    General: calm  CVS: RRR  Pulm: CTAB  GI: Soft, NTND  Extremities: No LE Edema  Neuro: more awake today, Ox2 (person and place), perseverating, PERRL, EOMI, R facial, motor 5/5 throughout

## 2022-01-26 NOTE — PROGRESS NOTE ADULT - ATTENDING COMMENTS
MRI with contrast planned for today  OR planned for tomorrow    Overall, more attentive today but still perseverates    At risk of death/neuro decline due to cerebral edema/brain compression from large brain tumor with vasogenic edema

## 2022-01-26 NOTE — DIETITIAN INITIAL EVALUATION ADULT. - ETIOLOGY
altered pancreatic function increased demand for nutrient in the setting of brain injury, pending neurosurgical intervention

## 2022-01-26 NOTE — DIETITIAN INITIAL EVALUATION ADULT. - ADD RECOMMEND
1. Continue consistent carbohydrate diet as ordered. Defer consistency to medical team, SLP. 2. Obtain and document re-weight. 3. RD to add diet Might[employer]akes TID with meals in anticipation of increased nutrient needs r/t pending surgery. Encourage and monitor PO intake. [place] dietary preferences as expressed as able. 4. Diet education deferred at this time 2/2 ? confusion/disorientation, acuity of present illness with pending surgery. RD to remain available to provide education PRN. 5. Monitor wt trends/labs/skin integrity/hydration status/bowel regularity.

## 2022-01-26 NOTE — CONSULT NOTE ADULT - SUBJECTIVE AND OBJECTIVE BOX
Patient is a 66y old  Male who presents with a chief complaint of     Admission HPI:  66M hx HTN/DM was [place] the [place] for vacation early January, was confused and not answering appropriately, MRI Jan 7 showed L frontal enhancing mass with significant edema. Repeat CTH today showed significant vasogenic edema and 1.5cm MLS. Exam: Awake, Ox1, answering many questions inappropriately/perseverating, EOMI, R drift, DUMONT strongl (24 Jan 2022 20:04)    Interval History:  Patient planned for surgical intervention on 1/27.  Most recent imaging:    CT Head No Cont (01.24.22 @ 19:35) >  Extensive lesion and surrounding vasogenic edema in left frontal and   temporal lobes with involvement of the corpus callosum and medial left   frontal lobe with marked ventricular effacement and 1.4 cm left to right   midline shift.    MR Brain Stereotactic w/wo IV Cont (01.25.22 @ 14:45) >  Study is limited as patient was unable to continue and did   not receive contrast to complete the preop evaluation study. Evidence of   mass lesion in the left frontal lobe with significant mass effect on the   left lateral ventricle and midline shift. Surrounding vasogenic edema.   Subfalcine herniation. Suspicion of possible invasion of the corpus   callosum. Recommend repeat possibly under anesthesia for more complete   evaluation    REVIEW OF SYSTEMS: No chest pain, shortness of breath, nausea, vomiting or diarhea; other ROS neg     PAST MEDICAL & SURGICAL HISTORY  Diabetes mellitus  Hypertension  No significant past surgical history    FUNCTIONAL HISTORY:   Lives alone in home with stairs.  PTA Independent    CURRENT FUNCTIONAL STATUS:  Min A transfers/gait    FAMILY HISTORY   N/C    MEDICATIONS   acetaminophen     Tablet .. 650 milliGRAM(s) Oral every 6 hours PRN  atorvastatin 20 milliGRAM(s) Oral at bedtime  bisacodyl 5 milliGRAM(s) Oral daily PRN  chlorhexidine 4% Liquid 1 Application(s) Topical daily  dexAMETHasone     Tablet 4 milliGRAM(s) Oral every 6 hours  dexAMETHasone  Injectable 4 milliGRAM(s) IV Push once  dextrose 40% Gel 15 Gram(s) Oral once  dextrose 5%. 1000 milliLiter(s) IV Continuous <Continuous>  dextrose 5%. 1000 milliLiter(s) IV Continuous <Continuous>  dextrose 50% Injectable 25 Gram(s) IV Push once  dextrose 50% Injectable 12.5 Gram(s) IV Push once  dextrose 50% Injectable 25 Gram(s) IV Push once  diazepam    Tablet 5 milliGRAM(s) Oral once  enoxaparin Injectable 40 milliGRAM(s) SubCutaneous once  glucagon  Injectable 1 milliGRAM(s) IntraMuscular once  insulin glargine Injectable (LANTUS) 10 Unit(s) SubCutaneous at bedtime  insulin lispro (ADMELOG) corrective regimen sliding scale   SubCutaneous Before meals and at bedtime  insulin lispro Injectable (ADMELOG) 8 Unit(s) SubCutaneous three times a day before meals  lacosamide 100 milliGRAM(s) Oral two times a day  lisinopril 2.5 milliGRAM(s) Oral daily  pantoprazole    Tablet 40 milliGRAM(s) Oral before breakfast  polyethylene glycol 3350 17 Gram(s) Oral every 12 hours  senna 2 Tablet(s) Oral at bedtime  sodium chloride 2% . 1000 milliLiter(s) IV Continuous <Continuous>    ALLERGIES  No Known Allergies    VITALS  T(C): 36.3 (01-26-22 @ 07:00), Max: 36.7 (01-25-22 @ 23:00)  HR: 68 (01-26-22 @ 09:00) (53 - 72)  BP: 122/68 (01-26-22 @ 09:00) (102/65 - 130/88)  RR: 14 (01-26-22 @ 09:00) (14 - 20)  SpO2: 94% (01-26-22 @ 09:00) (93% - 97%)  Wt(kg): --    PHYSICAL EXAM  Constitutional - NAD, Comfortable  HEENT - NCAT, EOMI  Neck - Supple, No limited ROM  Chest - CTA bilaterally, No wheeze, No rhonchi, No crackles  Cardiovascular - RRR, S1S2, No murmurs  Abdomen - BS+, Soft, NTND  Extremities - No C/C/E, No calf tenderness   Neurologic Exam -                    Cognitive - Awake, Alert, AAO to self, place, date, year, situation     Communication - Fluent, No dysarthria, no aphasia     Cranial Nerves - CN 2-12 intact     Motor - No focal deficits      Sensory - Intact to LT     Reflexes - DTR Intact, No primitive reflexive  Psychiatric - Mood stable, Affect WNL    RECENT LABS/IMAGING  CBC Full  -  ( 24 Jan 2022 19:30 )  WBC Count : 9.23 K/uL  RBC Count : 5.19 M/uL  Hemoglobin : 15.7 g/dL  Hematocrit : 48.8 %  Platelet Count - Automated : 140 K/uL  Mean Cell Volume : 94.0 fl  Mean Cell Hemoglobin : 30.3 pg  Mean Cell Hemoglobin Concentration : 32.2 gm/dL  Auto Neutrophil # : 5.22 K/uL  Auto Lymphocyte # : 2.73 K/uL  Auto Monocyte # : 0.69 K/uL  Auto Eosinophil # : 0.51 K/uL  Auto Basophil # : 0.06 K/uL  Auto Neutrophil % : 56.5 %  Auto Lymphocyte % : 29.6 %  Auto Monocyte % : 7.5 %  Auto Eosinophil % : 5.5 %  Auto Basophil % : 0.7 %    01-26    139  |  106  |  15  ----------------------------<  286<H>  4.1   |  19<L>  |  0.80    Ca    8.8      26 Jan 2022 09:30  Phos  3.9     01-26  Mg     2.4     01-25    TPro  7.7  /  Alb  4.5  /  TBili  0.7  /  DBili  x   /  AST  12  /  ALT  27  /  AlkPhos  69  01-24    Impression:  65 yo with functional deficits secondary to diagnosis of brain mass    Plan:  PT- ROM, Bed Mob, Transfers, Amb w AD and bracing as needed  OT- ADLs, bracing  SLP- Dysphagia eval and treat  Prec- Falls, Cardiac  DVT Prophylaxis- Lovenox  Skin- Turn q2 h  Dispo-  Patient is a 66y old  Male who presents with a chief complaint of     Admission HPI:  66M hx HTN/DM [place] early [place], was confused and not answering appropriately, MRI Jan 7 showed L frontal enhancing mass with significant edema. Repeat CTH today showed significant vasogenic edema and 1.5cm MLS. Exam: Awake, Ox1, answering many questions inappropriately/perseverating, EOMI, R drift, DUMONT strongl (24 Jan 2022 20:04)    Interval History:  Patient planned for surgical intervention on 1/27.  Most recent imaging:    CT Head No Cont (01.24.22 @ 19:35) >  Extensive lesion and surrounding vasogenic edema in left frontal and   temporal lobes with involvement of the corpus callosum and medial left   frontal lobe with marked ventricular effacement and 1.4 cm left to right   midline shift.    MR Brain Stereotactic w/wo IV Cont (01.25.22 @ 14:45) >  Study is limited as patient was unable to continue and did   not receive contrast to complete the preop evaluation study. Evidence of   mass lesion in the left frontal lobe with significant mass effect on the   left lateral ventricle and midline shift. Surrounding vasogenic edema.   Subfalcine herniation. Suspicion of possible invasion of the corpus   callosum. Recommend repeat possibly under anesthesia for more complete   evaluation    Daughter at bedside  REVIEW OF SYSTEMS: + poor balance, denies other ROS No chest pain, shortness of breath, nausea, vomiting or diarhea; other ROS neg     PAST MEDICAL & SURGICAL HISTORY  Diabetes mellitus  Hypertension  No significant past surgical history    FUNCTIONAL HISTORY:   Lives alone in home with stairs.  PTA Independent    CURRENT FUNCTIONAL STATUS:  Min A transfers/gait    FAMILY HISTORY   N/C    MEDICATIONS   acetaminophen     Tablet .. 650 milliGRAM(s) Oral every 6 hours PRN  atorvastatin 20 milliGRAM(s) Oral at bedtime  bisacodyl 5 milliGRAM(s) Oral daily PRN  chlorhexidine 4% Liquid 1 Application(s) Topical daily  dexAMETHasone     Tablet 4 milliGRAM(s) Oral every 6 hours  dexAMETHasone  Injectable 4 milliGRAM(s) IV Push once  dextrose 40% Gel 15 Gram(s) Oral once  dextrose 5%. 1000 milliLiter(s) IV Continuous <Continuous>  dextrose 5%. 1000 milliLiter(s) IV Continuous <Continuous>  dextrose 50% Injectable 25 Gram(s) IV Push once  dextrose 50% Injectable 12.5 Gram(s) IV Push once  dextrose 50% Injectable 25 Gram(s) IV Push once  diazepam    Tablet 5 milliGRAM(s) Oral once  enoxaparin Injectable 40 milliGRAM(s) SubCutaneous once  glucagon  Injectable 1 milliGRAM(s) IntraMuscular once  insulin glargine Injectable (LANTUS) 10 Unit(s) SubCutaneous at bedtime  insulin lispro (ADMELOG) corrective regimen sliding scale   SubCutaneous Before meals and at bedtime  insulin lispro Injectable (ADMELOG) 8 Unit(s) SubCutaneous three times a day before meals  lacosamide 100 milliGRAM(s) Oral two times a day  lisinopril 2.5 milliGRAM(s) Oral daily  pantoprazole    Tablet 40 milliGRAM(s) Oral before breakfast  polyethylene glycol 3350 17 Gram(s) Oral every 12 hours  senna 2 Tablet(s) Oral at bedtime  sodium chloride 2% . 1000 milliLiter(s) IV Continuous <Continuous>    ALLERGIES  No Known Allergies    VITALS  T(C): 36.3 (01-26-22 @ 07:00), Max: 36.7 (01-25-22 @ 23:00)  HR: 68 (01-26-22 @ 09:00) (53 - 72)  BP: 122/68 (01-26-22 @ 09:00) (102/65 - 130/88)  RR: 14 (01-26-22 @ 09:00) (14 - 20)  SpO2: 94% (01-26-22 @ 09:00) (93% - 97%)  Wt(kg): --    PHYSICAL EXAM  Constitutional - NAD, Comfortable  HEENT - NCAT, EOMI  Neck - Supple, No limited ROM  Chest - CTA bilaterally, No wheeze, No rhonchi, No crackles  Cardiovascular - RRR, S1S2, No murmurs  Abdomen - BS+, Soft, NTND  Extremities - No C/C/E, No calf tenderness   Neurologic Exam -                 Alert  Follows verbal instruction  Motor non-focal 4+/5 bl UE and LE    Psychiatric - Mood stable, Affect WNL    RECENT LABS/IMAGING  CBC Full  -  ( 24 Jan 2022 19:30 )  WBC Count : 9.23 K/uL  RBC Count : 5.19 M/uL  Hemoglobin : 15.7 g/dL  Hematocrit : 48.8 %  Platelet Count - Automated : 140 K/uL  Mean Cell Volume : 94.0 fl  Mean Cell Hemoglobin : 30.3 pg  Mean Cell Hemoglobin Concentration : 32.2 gm/dL  Auto Neutrophil # : 5.22 K/uL  Auto Lymphocyte # : 2.73 K/uL  Auto Monocyte # : 0.69 K/uL  Auto Eosinophil # : 0.51 K/uL  Auto Basophil # : 0.06 K/uL  Auto Neutrophil % : 56.5 %  Auto Lymphocyte % : 29.6 %  Auto Monocyte % : 7.5 %  Auto Eosinophil % : 5.5 %  Auto Basophil % : 0.7 %    01-26    139  |  106  |  15  ----------------------------<  286<H>  4.1   |  19<L>  |  0.80    Ca    8.8      26 Jan 2022 09:30  Phos  3.9     01-26  Mg     2.4     01-25    TPro  7.7  /  Alb  4.5  /  TBili  0.7  /  DBili  x   /  AST  12  /  ALT  27  /  AlkPhos  69  01-24    Impression:  65 yo with functional deficits secondary to diagnosis of brain mass    Plan:  PT- ROM, Bed Mob, Transfers, Amb w AD and bracing as needed  OT- ADLs, bracing  SLP- Dysphagia eval and treat  Prec- Falls, Cardiac  DVT Prophylaxis- Lovenox  Skin- Turn q2 h  Dispo- Post-op will likely need acute rehab  D/W patient and his daughter

## 2022-01-26 NOTE — PROGRESS NOTE ADULT - ASSESSMENT
Osiel Norwood    66M hx HTN/DM was [place] the [place] early January, was confused and not answering appropriately, MRI Jan 7 showed L frontal enhancing mass with significant edema.   -Adm NSCU  -Dex 4q6  -Keppra  -Repeat MRI w/ pending, only obtained WO as patient became agitated  -Preop for likely thursday

## 2022-01-26 NOTE — PROGRESS NOTE ADULT - ASSESSMENT
ASSESSMENT/PLAN:    66M presenting with brain mass with significant vasogenic edema with brain compression and midline shift     NEURO:  - neuro checks q2h  - MRI WWO  - dex 4q6h for vasogenic edema and HTS per below  - Vimpat 100mg BID for sz ppx (transitioned from keppra for behavior)  - PT/OT as tolerated    CVS:  - SBP goal 100-160  - lisinopril    PULM:  - RA  - IS As tolerated  - Aspiration precautions    RENAL:  - Fluids: HTS 2%@50  - na goal 140-150  - daily IOs  - BMP q8h    GI:  - Diet: ADAT, NPO MN for OR TThurs  - GI prophylaxis: PPi while on CCS  - Bowel regimen: miralax, senna    ENDO:   - FS goal 120-180  - lantus 10uqhs, readjust dose after OR tomorrow since will be NPO tonight  - lispro 8u TID     HEME/ONC:  - SCDs  - Chemoppx: SQL; hold kwaku-OR    ID:  - monitor for fevers      Patient is at high risk of neurologic deterioration/death due to:  cerebral edema, brain compression, seizures   ASSESSMENT/PLAN:    66M presenting with brain mass with significant vasogenic edema with brain compression and midline shift     NEURO:  pre op for OR tomorrow  - neuro checks q2h  - dex 4q6h for vasogenic edema and HTS per below  - Vimpat 100mg BID for sz ppx (transitioned from keppra for behavior)  - PT/OT as tolerated  - gleolan to be given at 5:30am    CVS: Hypertension  - SBP goal 100-160  - lisinopril    PULM:  - RA  - IS As tolerated  - Aspiration precautions    RENAL:  - Fluids: HTS 2%@50  - na goal 140-150  - daily IOs  - BMP q8h    GI:  - Diet: ADAT, NPO MN for OR  - GI prophylaxis: PPi while on CCS  - Bowel regimen: miralax, senna    ENDO:   - FS goal 120-180; poorly controlled FS in 200's   - lantus 20 units given tonight; pre meals 12U tid hold while NPO   - JANIE    HEME/ONC:  - SCDs  - Chemoppx: SQL; hold kwaku-OR    ID:  - monitor for fevers      Patient is at high risk of neurologic deterioration/death due to:  cerebral edema, brain compression, seizures

## 2022-01-27 ENCOUNTER — RESULT REVIEW (OUTPATIENT)
Age: 67
End: 2022-01-27

## 2022-01-27 ENCOUNTER — APPOINTMENT (OUTPATIENT)
Dept: NEUROSURGERY | Facility: HOSPITAL | Age: 67
End: 2022-01-27

## 2022-01-27 LAB
ALBUMIN SERPL ELPH-MCNC: 2.9 G/DL — LOW (ref 3.3–5)
ALP SERPL-CCNC: 45 U/L — SIGNIFICANT CHANGE UP (ref 40–120)
ALT FLD-CCNC: 13 U/L — SIGNIFICANT CHANGE UP (ref 10–45)
ANION GAP SERPL CALC-SCNC: 14 MMOL/L — SIGNIFICANT CHANGE UP (ref 5–17)
ANION GAP SERPL CALC-SCNC: 20 MMOL/L — HIGH (ref 5–17)
APTT BLD: 21 SEC — LOW (ref 27.5–35.5)
AST SERPL-CCNC: 9 U/L — LOW (ref 10–40)
BASOPHILS # BLD AUTO: 0.01 K/UL — SIGNIFICANT CHANGE UP (ref 0–0.2)
BASOPHILS NFR BLD AUTO: 0.1 % — SIGNIFICANT CHANGE UP (ref 0–2)
BILIRUB SERPL-MCNC: 1 MG/DL — SIGNIFICANT CHANGE UP (ref 0.2–1.2)
BUN SERPL-MCNC: 11 MG/DL — SIGNIFICANT CHANGE UP (ref 7–23)
BUN SERPL-MCNC: 15 MG/DL — SIGNIFICANT CHANGE UP (ref 7–23)
CALCIUM SERPL-MCNC: 7.1 MG/DL — LOW (ref 8.4–10.5)
CALCIUM SERPL-MCNC: 9.5 MG/DL — SIGNIFICANT CHANGE UP (ref 8.4–10.5)
CHLORIDE SERPL-SCNC: 107 MMOL/L — SIGNIFICANT CHANGE UP (ref 96–108)
CHLORIDE SERPL-SCNC: 108 MMOL/L — SIGNIFICANT CHANGE UP (ref 96–108)
CO2 SERPL-SCNC: 17 MMOL/L — LOW (ref 22–31)
CO2 SERPL-SCNC: 22 MMOL/L — SIGNIFICANT CHANGE UP (ref 22–31)
CREAT SERPL-MCNC: 0.65 MG/DL — SIGNIFICANT CHANGE UP (ref 0.5–1.3)
CREAT SERPL-MCNC: 0.82 MG/DL — SIGNIFICANT CHANGE UP (ref 0.5–1.3)
EOSINOPHIL # BLD AUTO: 0 K/UL — SIGNIFICANT CHANGE UP (ref 0–0.5)
EOSINOPHIL NFR BLD AUTO: 0 % — SIGNIFICANT CHANGE UP (ref 0–6)
GAS PNL BLDA: SIGNIFICANT CHANGE UP
GLUCOSE BLDC GLUCOMTR-MCNC: 232 MG/DL — HIGH (ref 70–99)
GLUCOSE BLDC GLUCOMTR-MCNC: 250 MG/DL — HIGH (ref 70–99)
GLUCOSE SERPL-MCNC: 112 MG/DL — HIGH (ref 70–99)
GLUCOSE SERPL-MCNC: 195 MG/DL — HIGH (ref 70–99)
HCT VFR BLD CALC: 39.2 % — SIGNIFICANT CHANGE UP (ref 39–50)
HGB BLD-MCNC: 13.2 G/DL — SIGNIFICANT CHANGE UP (ref 13–17)
IMM GRANULOCYTES NFR BLD AUTO: 0.6 % — SIGNIFICANT CHANGE UP (ref 0–1.5)
INR BLD: 1.15 RATIO — SIGNIFICANT CHANGE UP (ref 0.88–1.16)
LYMPHOCYTES # BLD AUTO: 0.72 K/UL — LOW (ref 1–3.3)
LYMPHOCYTES # BLD AUTO: 4.4 % — LOW (ref 13–44)
MAGNESIUM SERPL-MCNC: 2 MG/DL — SIGNIFICANT CHANGE UP (ref 1.6–2.6)
MCHC RBC-ENTMCNC: 30.8 PG — SIGNIFICANT CHANGE UP (ref 27–34)
MCHC RBC-ENTMCNC: 33.7 GM/DL — SIGNIFICANT CHANGE UP (ref 32–36)
MCV RBC AUTO: 91.4 FL — SIGNIFICANT CHANGE UP (ref 80–100)
MONOCYTES # BLD AUTO: 0.72 K/UL — SIGNIFICANT CHANGE UP (ref 0–0.9)
MONOCYTES NFR BLD AUTO: 4.4 % — SIGNIFICANT CHANGE UP (ref 2–14)
NEUTROPHILS # BLD AUTO: 14.79 K/UL — HIGH (ref 1.8–7.4)
NEUTROPHILS NFR BLD AUTO: 90.5 % — HIGH (ref 43–77)
NRBC # BLD: 0 /100 WBCS — SIGNIFICANT CHANGE UP (ref 0–0)
PHOSPHATE SERPL-MCNC: 3.9 MG/DL — SIGNIFICANT CHANGE UP (ref 2.5–4.5)
PLATELET # BLD AUTO: 106 K/UL — LOW (ref 150–400)
POTASSIUM SERPL-MCNC: 3.3 MMOL/L — LOW (ref 3.5–5.3)
POTASSIUM SERPL-MCNC: 3.6 MMOL/L — SIGNIFICANT CHANGE UP (ref 3.5–5.3)
POTASSIUM SERPL-SCNC: 3.3 MMOL/L — LOW (ref 3.5–5.3)
POTASSIUM SERPL-SCNC: 3.6 MMOL/L — SIGNIFICANT CHANGE UP (ref 3.5–5.3)
PROT SERPL-MCNC: 4.9 G/DL — LOW (ref 6–8.3)
PROTHROM AB SERPL-ACNC: 13.7 SEC — HIGH (ref 10.6–13.6)
RBC # BLD: 4.29 M/UL — SIGNIFICANT CHANGE UP (ref 4.2–5.8)
RBC # FLD: 11.8 % — SIGNIFICANT CHANGE UP (ref 10.3–14.5)
SARS-COV-2 RNA SPEC QL NAA+PROBE: SIGNIFICANT CHANGE UP
SODIUM SERPL-SCNC: 143 MMOL/L — SIGNIFICANT CHANGE UP (ref 135–145)
SODIUM SERPL-SCNC: 145 MMOL/L — SIGNIFICANT CHANGE UP (ref 135–145)
WBC # BLD: 16.33 K/UL — HIGH (ref 3.8–10.5)
WBC # FLD AUTO: 16.33 K/UL — HIGH (ref 3.8–10.5)

## 2022-01-27 PROCEDURE — 88360 TUMOR IMMUNOHISTOCHEM/MANUAL: CPT | Mod: 26

## 2022-01-27 PROCEDURE — 88313 SPECIAL STAINS GROUP 2: CPT | Mod: 26

## 2022-01-27 PROCEDURE — 88334 PATH CONSLTJ SURG CYTO XM EA: CPT | Mod: 26,59

## 2022-01-27 PROCEDURE — 70450 CT HEAD/BRAIN W/O DYE: CPT | Mod: 26,77

## 2022-01-27 PROCEDURE — 61781 SCAN PROC CRANIAL INTRA: CPT

## 2022-01-27 PROCEDURE — 71045 X-RAY EXAM CHEST 1 VIEW: CPT | Mod: 26

## 2022-01-27 PROCEDURE — 88307 TISSUE EXAM BY PATHOLOGIST: CPT | Mod: 26

## 2022-01-27 PROCEDURE — 88341 IMHCHEM/IMCYTCHM EA ADD ANTB: CPT | Mod: 26,59

## 2022-01-27 PROCEDURE — 99291 CRITICAL CARE FIRST HOUR: CPT

## 2022-01-27 PROCEDURE — 69990 MICROSURGERY ADD-ON: CPT | Mod: 59

## 2022-01-27 PROCEDURE — 70450 CT HEAD/BRAIN W/O DYE: CPT | Mod: 26

## 2022-01-27 PROCEDURE — 88331 PATH CONSLTJ SURG 1 BLK 1SPC: CPT | Mod: 26

## 2022-01-27 PROCEDURE — 61510 CRNEC TREPH EXC BRN TUM STTL: CPT

## 2022-01-27 PROCEDURE — 95720 EEG PHY/QHP EA INCR W/VEEG: CPT

## 2022-01-27 PROCEDURE — 88342 IMHCHEM/IMCYTCHM 1ST ANTB: CPT | Mod: 26,59

## 2022-01-27 RX ORDER — POLYETHYLENE GLYCOL 3350 17 G/17G
17 POWDER, FOR SOLUTION ORAL EVERY 12 HOURS
Refills: 0 | Status: DISCONTINUED | OUTPATIENT
Start: 2022-01-27 | End: 2022-02-08

## 2022-01-27 RX ORDER — LEVETIRACETAM 250 MG/1
1000 TABLET, FILM COATED ORAL EVERY 12 HOURS
Refills: 0 | Status: DISCONTINUED | OUTPATIENT
Start: 2022-01-27 | End: 2022-01-28

## 2022-01-27 RX ORDER — CALCIUM GLUCONATE 100 MG/ML
2 VIAL (ML) INTRAVENOUS ONCE
Refills: 0 | Status: COMPLETED | OUTPATIENT
Start: 2022-01-27 | End: 2022-01-27

## 2022-01-27 RX ORDER — PANTOPRAZOLE SODIUM 20 MG/1
40 TABLET, DELAYED RELEASE ORAL
Refills: 0 | Status: DISCONTINUED | OUTPATIENT
Start: 2022-01-27 | End: 2022-01-27

## 2022-01-27 RX ORDER — SENNA PLUS 8.6 MG/1
2 TABLET ORAL AT BEDTIME
Refills: 0 | Status: DISCONTINUED | OUTPATIENT
Start: 2022-01-27 | End: 2022-02-08

## 2022-01-27 RX ORDER — ACETAMINOPHEN 500 MG
650 TABLET ORAL EVERY 6 HOURS
Refills: 0 | Status: DISCONTINUED | OUTPATIENT
Start: 2022-01-27 | End: 2022-02-08

## 2022-01-27 RX ORDER — DEXTROSE 50 % IN WATER 50 %
12.5 SYRINGE (ML) INTRAVENOUS ONCE
Refills: 0 | Status: DISCONTINUED | OUTPATIENT
Start: 2022-01-27 | End: 2022-02-08

## 2022-01-27 RX ORDER — DEXTROSE 50 % IN WATER 50 %
25 SYRINGE (ML) INTRAVENOUS ONCE
Refills: 0 | Status: DISCONTINUED | OUTPATIENT
Start: 2022-01-27 | End: 2022-02-08

## 2022-01-27 RX ORDER — DEXTROSE 50 % IN WATER 50 %
15 SYRINGE (ML) INTRAVENOUS ONCE
Refills: 0 | Status: DISCONTINUED | OUTPATIENT
Start: 2022-01-27 | End: 2022-01-28

## 2022-01-27 RX ORDER — DEXTROSE 50 % IN WATER 50 %
25 SYRINGE (ML) INTRAVENOUS ONCE
Refills: 0 | Status: DISCONTINUED | OUTPATIENT
Start: 2022-01-27 | End: 2022-01-28

## 2022-01-27 RX ORDER — CHLORHEXIDINE GLUCONATE 213 G/1000ML
15 SOLUTION TOPICAL EVERY 12 HOURS
Refills: 0 | Status: DISCONTINUED | OUTPATIENT
Start: 2022-01-27 | End: 2022-01-30

## 2022-01-27 RX ORDER — DEXAMETHASONE 0.5 MG/5ML
8 ELIXIR ORAL ONCE
Refills: 0 | Status: COMPLETED | OUTPATIENT
Start: 2022-01-27 | End: 2022-01-27

## 2022-01-27 RX ORDER — SODIUM CHLORIDE 9 MG/ML
1000 INJECTION INTRAMUSCULAR; INTRAVENOUS; SUBCUTANEOUS
Refills: 0 | Status: DISCONTINUED | OUTPATIENT
Start: 2022-01-27 | End: 2022-01-28

## 2022-01-27 RX ORDER — LACOSAMIDE 50 MG/1
100 TABLET ORAL
Refills: 0 | Status: DISCONTINUED | OUTPATIENT
Start: 2022-01-27 | End: 2022-01-27

## 2022-01-27 RX ORDER — POTASSIUM CHLORIDE 20 MEQ
20 PACKET (EA) ORAL ONCE
Refills: 0 | Status: COMPLETED | OUTPATIENT
Start: 2022-01-27 | End: 2022-01-27

## 2022-01-27 RX ORDER — ACETAMINOPHEN 500 MG
650 TABLET ORAL EVERY 6 HOURS
Refills: 0 | Status: DISCONTINUED | OUTPATIENT
Start: 2022-01-27 | End: 2022-01-27

## 2022-01-27 RX ORDER — DEXAMETHASONE 0.5 MG/5ML
8 ELIXIR ORAL EVERY 6 HOURS
Refills: 0 | Status: DISCONTINUED | OUTPATIENT
Start: 2022-01-27 | End: 2022-01-28

## 2022-01-27 RX ORDER — INSULIN LISPRO 100/ML
VIAL (ML) SUBCUTANEOUS
Refills: 0 | Status: DISCONTINUED | OUTPATIENT
Start: 2022-01-27 | End: 2022-01-28

## 2022-01-27 RX ORDER — LACOSAMIDE 50 MG/1
200 TABLET ORAL
Refills: 0 | Status: DISCONTINUED | OUTPATIENT
Start: 2022-01-27 | End: 2022-01-29

## 2022-01-27 RX ORDER — LACOSAMIDE 50 MG/1
200 TABLET ORAL ONCE
Refills: 0 | Status: DISCONTINUED | OUTPATIENT
Start: 2022-01-27 | End: 2022-01-27

## 2022-01-27 RX ORDER — FENTANYL CITRATE 50 UG/ML
25 INJECTION INTRAVENOUS ONCE
Refills: 0 | Status: DISCONTINUED | OUTPATIENT
Start: 2022-01-27 | End: 2022-01-27

## 2022-01-27 RX ORDER — POTASSIUM CHLORIDE 20 MEQ
10 PACKET (EA) ORAL
Refills: 0 | Status: COMPLETED | OUTPATIENT
Start: 2022-01-27 | End: 2022-01-27

## 2022-01-27 RX ORDER — NAFCILLIN 10 G/100ML
1 INJECTION, POWDER, FOR SOLUTION INTRAVENOUS EVERY 6 HOURS
Refills: 0 | Status: COMPLETED | OUTPATIENT
Start: 2022-01-27 | End: 2022-01-28

## 2022-01-27 RX ORDER — GLUCAGON INJECTION, SOLUTION 0.5 MG/.1ML
1 INJECTION, SOLUTION SUBCUTANEOUS ONCE
Refills: 0 | Status: DISCONTINUED | OUTPATIENT
Start: 2022-01-27 | End: 2022-02-08

## 2022-01-27 RX ORDER — LISINOPRIL 2.5 MG/1
2.5 TABLET ORAL DAILY
Refills: 0 | Status: DISCONTINUED | OUTPATIENT
Start: 2022-01-27 | End: 2022-01-28

## 2022-01-27 RX ORDER — INSULIN LISPRO 100/ML
12 VIAL (ML) SUBCUTANEOUS
Refills: 0 | Status: DISCONTINUED | OUTPATIENT
Start: 2022-01-27 | End: 2022-01-28

## 2022-01-27 RX ORDER — INSULIN GLARGINE 100 [IU]/ML
20 INJECTION, SOLUTION SUBCUTANEOUS AT BEDTIME
Refills: 0 | Status: DISCONTINUED | OUTPATIENT
Start: 2022-01-27 | End: 2022-01-29

## 2022-01-27 RX ORDER — ATORVASTATIN CALCIUM 80 MG/1
20 TABLET, FILM COATED ORAL AT BEDTIME
Refills: 0 | Status: DISCONTINUED | OUTPATIENT
Start: 2022-01-27 | End: 2022-02-08

## 2022-01-27 RX ADMIN — SENNA PLUS 2 TABLET(S): 8.6 TABLET ORAL at 21:27

## 2022-01-27 RX ADMIN — Medication 1 MILLIGRAM(S): at 16:27

## 2022-01-27 RX ADMIN — NAFCILLIN 100 GRAM(S): 10 INJECTION, POWDER, FOR SOLUTION INTRAVENOUS at 23:22

## 2022-01-27 RX ADMIN — Medication 200 GRAM(S): at 18:37

## 2022-01-27 RX ADMIN — FENTANYL CITRATE 25 MICROGRAM(S): 50 INJECTION INTRAVENOUS at 20:53

## 2022-01-27 RX ADMIN — SODIUM CHLORIDE 100 MILLILITER(S): 9 INJECTION INTRAMUSCULAR; INTRAVENOUS; SUBCUTANEOUS at 20:23

## 2022-01-27 RX ADMIN — LACOSAMIDE 100 MILLIGRAM(S): 50 TABLET ORAL at 05:29

## 2022-01-27 RX ADMIN — NAFCILLIN 100 GRAM(S): 10 INJECTION, POWDER, FOR SOLUTION INTRAVENOUS at 18:07

## 2022-01-27 RX ADMIN — Medication 8 MILLIGRAM(S): at 23:22

## 2022-01-27 RX ADMIN — LISINOPRIL 2.5 MILLIGRAM(S): 2.5 TABLET ORAL at 05:29

## 2022-01-27 RX ADMIN — Medication 100 MILLIEQUIVALENT(S): at 18:05

## 2022-01-27 RX ADMIN — Medication 4: at 23:06

## 2022-01-27 RX ADMIN — Medication 2: at 17:58

## 2022-01-27 RX ADMIN — AMINOLEVULINIC ACID HYDROCHLORIDE 1560 MILLIGRAM(S): 1500 POWDER, FOR SOLUTION ORAL at 05:29

## 2022-01-27 RX ADMIN — Medication 100 MILLIEQUIVALENT(S): at 19:02

## 2022-01-27 RX ADMIN — Medication 2 MILLIGRAM(S): at 16:21

## 2022-01-27 RX ADMIN — Medication 101.6 MILLIGRAM(S): at 18:37

## 2022-01-27 RX ADMIN — FENTANYL CITRATE 25 MICROGRAM(S): 50 INJECTION INTRAVENOUS at 20:23

## 2022-01-27 RX ADMIN — CHLORHEXIDINE GLUCONATE 15 MILLILITER(S): 213 SOLUTION TOPICAL at 18:05

## 2022-01-27 RX ADMIN — LEVETIRACETAM 400 MILLIGRAM(S): 250 TABLET, FILM COATED ORAL at 17:00

## 2022-01-27 RX ADMIN — Medication 4 MILLIGRAM(S): at 05:29

## 2022-01-27 RX ADMIN — INSULIN GLARGINE 20 UNIT(S): 100 INJECTION, SOLUTION SUBCUTANEOUS at 23:07

## 2022-01-27 RX ADMIN — LACOSAMIDE 140 MILLIGRAM(S): 50 TABLET ORAL at 18:37

## 2022-01-27 RX ADMIN — POLYETHYLENE GLYCOL 3350 17 GRAM(S): 17 POWDER, FOR SOLUTION ORAL at 21:26

## 2022-01-27 RX ADMIN — Medication 100 MILLIEQUIVALENT(S): at 21:19

## 2022-01-27 RX ADMIN — Medication 20 MILLIEQUIVALENT(S): at 02:59

## 2022-01-27 RX ADMIN — ATORVASTATIN CALCIUM 20 MILLIGRAM(S): 80 TABLET, FILM COATED ORAL at 21:26

## 2022-01-27 NOTE — PROGRESS NOTE ADULT - ASSESSMENT
ASSESSMENT/PLAN:    66M presenting with brain mass with significant vasogenic edema with brain compression and midline shift     NEURO:  - neuro checks q2h  - MRI WWO  - dex 4q6h for vasogenic edema and HTS per below  - Vimpat 100mg BID for sz ppx (transitioned from keppra for behavior)  - PT/OT as tolerated    CVS:  - SBP goal 100-160  - lisinopril    PULM:  - RA  - IS As tolerated  - Aspiration precautions    RENAL:  - Fluids: HTS 2%@50  - na goal 140-150  - daily IOs  - BMP q8h    GI:  - Diet: ADAT, NPO MN for OR TThurs  - GI prophylaxis: PPi while on CCS  - Bowel regimen: miralax, senna    ENDO:   - FS goal 120-180  - lantus 10uqhs, readjust dose after OR tomorrow since will be NPO tonight  - lispro 8u TID     HEME/ONC:  - SCDs  - Chemoppx: SQL; hold kwaku-OR    ID:  - monitor for fevers      Patient is at high risk of neurologic deterioration/death due to:  cerebral edema, brain compression, seizures     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

## 2022-01-27 NOTE — PROGRESS NOTE ADULT - SUBJECTIVE AND OBJECTIVE BOX
After conclusion of craniotomy, patient had prolonged emergence from anesthesia. At the end of the case, patient was breathing spontaneously generating tidal volumes of over 500ml at rate of 18-20. However, patient was not following commands, opening eyes, or spontaneously moving extremities. He did display pharyngeal reflexes and occasional cough. ABG checked and no gross abnormalities seen. Decision made to keep patient intubated and obtain stat CT scan, which was negative for hematoma as per neurosurgery. Patient transported to the neuro ICU intubated but breathing spontaneously. He did not receive long-acting opioids, but possible that maintenance anesthetic still having an effect (especially given that patient was not fully oriented pre-op). Full report given to neuro ICU.

## 2022-01-27 NOTE — PROGRESS NOTE ADULT - ATTENDING COMMENTS
D/W Fellow status post 5-A:A at 5:30AM.    OX2, follows in [place], perseverates, right facial, symmetric and full strength throughout    OR for resection  Steroids for cerebral edema  Will adjust insulin regimen post-op as now NPO

## 2022-01-27 NOTE — PROGRESS NOTE ADULT - SUBJECTIVE AND OBJECTIVE BOX
NSCU Progress Note    Assessment/Hospital Course:    66M hx HTN/DM was [place] the [place] vacation early January, was confused and not answering appropriately, MRI Jan 7 showed L frontal enhancing mass with significant edema. Repeat CTH showed significant vasogenic edema and 1.5cm MLS    24 Hour Events/Subjective:  - started on HTS for cerebral edema    VITALS:   -Reviewed      IMAGING/DATA:   - Reviewed      PHYSICAL EXAM:    General: calm  CVS: RRR  Pulm: CTAB  GI: Soft, NTND  Extremities: No LE Edema  Neuro: more awake today, Ox2 (person and place), perseverating, PERRL, EOMI, R facial, motor 5/5 throughout NSCU Progress Note    Assessment/Hospital Course:    66M hx HTN/DM was in the [place] for vacation early January, was confused and not answering appropriately, MRI Jan 7 showed L frontal enhancing mass with significant edema. Repeat CTH showed significant vasogenic edema and 1.5cm MLS    24 Hour Events/Subjective:  - started on HTS for cerebral edema  - given Gleolan at 5:30AM    VITALS:   -Reviewed      IMAGING/DATA:   - Reviewed      PHYSICAL EXAM:  General: calm  CVS: RRR  Pulm: CTAB  GI: Soft, NTND  Extremities: No LE Edema  Neuro: Flat affect, Ox2 (person and place), perseverating, PERRL, EOMI, R facial, motor 5/5 throughout

## 2022-01-27 NOTE — PRE-ANESTHESIA EVALUATION ADULT - NSANTHPEFT_GEN_ALL_CORE
General: Well appearing, no distress  CV: Regular  Pulm: Unlabored  Neuro: AAOx1-2, spontaneously moving all extremities, able to follow basic commands.

## 2022-01-27 NOTE — CHART NOTE - NSCHARTNOTEFT_GEN_A_CORE
[facility]    ** PRELIMINARY EEG reviewed until  20:00    - No seizures recorded so far.      Final report to be completed at the completion of the study tomorrow morning.    -----------------------------  Armin Grant MD, MBA  Epilepsy Fellow    --------------------------------  EEG Reading Room: [phone]  (weekdays)  On Call Service After Hours: [phone]

## 2022-01-27 NOTE — PRE-ANESTHESIA EVALUATION ADULT - NSANTHOSAYNRD_GEN_A_CORE
No. CALVIN screening performed.  STOP BANG Legend: 0-2 = LOW Risk; 3-4 = INTERMEDIATE Risk; 5-8 = HIGH Risk

## 2022-01-27 NOTE — PROGRESS NOTE ADULT - SUBJECTIVE AND OBJECTIVE BOX
Patient seen and examined at bedside.    --Anticoagulation--    T(C): 36.8 (01-27-22 @ 03:00), Max: 36.8 (01-27-22 @ 03:00)  HR: 61 (01-27-22 @ 03:00) (40 - 81)  BP: 132/76 (01-27-22 @ 03:00) (111/70 - 167/74)  RR: 20 (01-27-22 @ 03:00) (13 - 24)  SpO2: 96% (01-27-22 @ 03:00) (92% - 99%)  Wt(kg): --    Exam:    Awake, expressive aphasia Ox1, perseverates. FC, DUMONT strongly but has a R drift

## 2022-01-27 NOTE — PROGRESS NOTE ADULT - SUBJECTIVE AND OBJECTIVE BOX
HPI:  66M hx HTN/DM was in [place] early January, was confused and not answering appropriately, MRI Jan 7 showed L frontal enhancing mass with significant edema. Repeat CTH showed significant vasogenic edema and 1.5cm MLS    EVENTS:   Day 0 post craniotomy and resection   Kept intubated post op  vEEG monitor/ post op CT no complications    VITALS:  T(C): , Max: 36.8 (01-27-22 @ 03:00)  HR:  (40 - 80)  BP:  (107/70 - 167/74)  ABP:  (140/64 - 150/73)  RR:  (13 - 24)  SpO2:  (88% - 100%)  Wt(kg): --  Device: Avea, Mode: AC/ CMV (Assist Control/ Continuous Mandatory Ventilation), RR (machine): 12, TV (machine): 45, FiO2: 40, PEEP: 5, ITime: 1, MAP: 8, PIP: 20    01-26-22 @ 07:01  -  01-27-22 @ 07:00  --------------------------------------------------------  IN: 2729.8 mL / OUT: 1850 mL / NET: 879.8 mL      LABS:  Na: 145 (01-27 @ 16:41), 143 (01-27 @ 04:36), 140 (01-26 @ 22:37), 137 (01-26 @ 16:18), 139 (01-26 @ 09:30), 140 (01-26 @ 03:22), 134 (01-25 @ 21:12), 135 (01-25 @ 15:33), 136 (01-25 @ 10:13), 136 (01-24 @ 19:30)  K: 3.3 (01-27 @ 16:41), 3.6 (01-27 @ 04:36), 3.9 (01-26 @ 22:37), 4.6 (01-26 @ 16:18), 4.1 (01-26 @ 09:30), 4.0 (01-26 @ 03:22), 4.0 (01-25 @ 21:12), 4.1 (01-25 @ 15:33), 4.5 (01-25 @ 10:13), 3.8 (01-24 @ 19:30)  Cl: 108 (01-27 @ 16:41), 107 (01-27 @ 04:36), 107 (01-26 @ 22:37), 105 (01-26 @ 16:18), 106 (01-26 @ 09:30), 107 (01-26 @ 03:22), 104 (01-25 @ 21:12), 101 (01-25 @ 15:33), 100 (01-25 @ 10:13), 98 (01-24 @ 19:30)  CO2: 17 (01-27 @ 16:41), 22 (01-27 @ 04:36), 19 (01-26 @ 22:37), 16 (01-26 @ 16:18), 19 (01-26 @ 09:30), 20 (01-26 @ 03:22), 20 (01-25 @ 21:12), 22 (01-25 @ 15:33), 23 (01-25 @ 10:13), 24 (01-24 @ 19:30)  BUN: 11 (01-27 @ 16:41), 15 (01-27 @ 04:36), 18 (01-26 @ 22:37), 19 (01-26 @ 16:18), 15 (01-26 @ 09:30), 15 (01-26 @ 03:22), 16 (01-25 @ 21:12), 15 (01-25 @ 15:33), 12 (01-25 @ 10:13), 15 (01-24 @ 19:30)  Cr: 0.65 (01-27 @ 16:41), 0.82 (01-27 @ 04:36), 0.83 (01-26 @ 22:37), 0.87 (01-26 @ 16:18), 0.80 (01-26 @ 09:30), 0.80 (01-26 @ 03:22), 0.89 (01-25 @ 21:12), 0.93 (01-25 @ 15:33), 0.78 (01-25 @ 10:13), 0.90 (01-24 @ 19:30)  Glu: 195(01-27 @ 16:41), 112(01-27 @ 04:36), 284(01-26 @ 22:37), 349(01-26 @ 16:18), 286(01-26 @ 09:30), 205(01-26 @ 03:22), 248(01-25 @ 21:12), 315(01-25 @ 15:33), 286(01-25 @ 10:13), 176(01-24 @ 19:30)    Hgb: 13.2 (01-27 @ 16:41), 14.4 (01-26 @ 22:37), 15.7 (01-24 @ 19:30)  Hct: 39.2 (01-27 @ 16:41), 43.0 (01-26 @ 22:37), 48.8 (01-24 @ 19:30)  WBC: 16.33 (01-27 @ 16:41), 16.26 (01-26 @ 22:37), 9.23 (01-24 @ 19:30)  Plt: 106 (01-27 @ 16:41), 108 (01-26 @ 22:37), 140 (01-24 @ 19:30)    INR: 1.15 01-27-22 @ 16:41, 1.09 01-26-22 @ 22:37, 1.02 01-24-22 @ 19:30  PTT: 21.0 01-27-22 @ 16:41, 23.5 01-26-22 @ 22:37, 27.6 01-24-22 @ 19:30    IMAGING:   Recent imaging studies were reviewed.    MEDICATIONS:  acetaminophen     Tablet .. 650 milliGRAM(s) Oral every 6 hours PRN  atorvastatin 20 milliGRAM(s) Oral at bedtime  bisacodyl 5 milliGRAM(s) Oral daily PRN  calcium gluconate IVPB 2 Gram(s) IV Intermittent once  chlorhexidine 0.12% Liquid 15 milliLiter(s) Oral Mucosa every 12 hours  dexAMETHasone     Tablet 8 milliGRAM(s) Oral every 6 hours  dexAMETHasone  IVPB 8 milliGRAM(s) IV Intermittent once  dextrose 40% Gel 15 Gram(s) Oral once  dextrose 50% Injectable 25 Gram(s) IV Push once  dextrose 50% Injectable 12.5 Gram(s) IV Push once  dextrose 50% Injectable 25 Gram(s) IV Push once  fentaNYL    Injectable 25 MICROGram(s) IV Push once  glucagon  Injectable 1 milliGRAM(s) IntraMuscular once  insulin glargine Injectable (LANTUS) 20 Unit(s) SubCutaneous at bedtime  insulin lispro (ADMELOG) corrective regimen sliding scale   SubCutaneous Before meals and at bedtime  insulin lispro Injectable (ADMELOG) 12 Unit(s) SubCutaneous three times a day before meals  lacosamide 200 milliGRAM(s) Oral two times a day  lacosamide IVPB 200 milliGRAM(s) IV Intermittent once  levETIRAcetam  IVPB 1000 milliGRAM(s) IV Intermittent every 12 hours  lisinopril 2.5 milliGRAM(s) Oral daily  nafcillin  IVPB 1 Gram(s) IV Intermittent every 6 hours  pantoprazole    Tablet 40 milliGRAM(s) Oral before breakfast  polyethylene glycol 3350 17 Gram(s) Oral every 12 hours  potassium chloride  10 mEq/100 mL IVPB 10 milliEquivalent(s) IV Intermittent every 1 hour  senna 2 Tablet(s) Oral at bedtime  sodium chloride 0.9%. 1000 milliLiter(s) IV Continuous <Continuous>    PHYSICAL EXAM:  General: calm  CVS: RRR  Pulm: CTAB  GI: Soft, NTND  Extremities: No LE Edema  Neuro: Flat affect, Ox2 (person and place), perseverating, PERRL, EOMI, R facial, motor 5/5 throughout   HPI:  66M hx HTN/DM was [place] early January, was confused and not answering appropriately, MRI Jan 7 showed L frontal enhancing mass with significant edema. Repeat CTH showed significant vasogenic edema and 1.5cm MLS    EVENTS:   Day 0 post craniotomy and resection   Kept intubated post op  vEEG monitor/ post op CT no complications    VITALS:  T(C): , Max: 36.8 (01-27-22 @ 03:00)  HR:  (40 - 80)  BP:  (107/70 - 167/74)  ABP:  (140/64 - 150/73)  RR:  (13 - 24)  SpO2:  (88% - 100%)  Wt(kg): --  Device: Avea, Mode: AC/ CMV (Assist Control/ Continuous Mandatory Ventilation), RR (machine): 12, TV (machine): 45, FiO2: 40, PEEP: 5, ITime: 1, MAP: 8, PIP: 20    01-26-22 @ 07:01  -  01-27-22 @ 07:00  --------------------------------------------------------  IN: 2729.8 mL / OUT: 1850 mL / NET: 879.8 mL      LABS:  Na: 145 (01-27 @ 16:41), 143 (01-27 @ 04:36), 140 (01-26 @ 22:37), 137 (01-26 @ 16:18), 139 (01-26 @ 09:30), 140 (01-26 @ 03:22), 134 (01-25 @ 21:12), 135 (01-25 @ 15:33), 136 (01-25 @ 10:13), 136 (01-24 @ 19:30)  K: 3.3 (01-27 @ 16:41), 3.6 (01-27 @ 04:36), 3.9 (01-26 @ 22:37), 4.6 (01-26 @ 16:18), 4.1 (01-26 @ 09:30), 4.0 (01-26 @ 03:22), 4.0 (01-25 @ 21:12), 4.1 (01-25 @ 15:33), 4.5 (01-25 @ 10:13), 3.8 (01-24 @ 19:30)  Cl: 108 (01-27 @ 16:41), 107 (01-27 @ 04:36), 107 (01-26 @ 22:37), 105 (01-26 @ 16:18), 106 (01-26 @ 09:30), 107 (01-26 @ 03:22), 104 (01-25 @ 21:12), 101 (01-25 @ 15:33), 100 (01-25 @ 10:13), 98 (01-24 @ 19:30)  CO2: 17 (01-27 @ 16:41), 22 (01-27 @ 04:36), 19 (01-26 @ 22:37), 16 (01-26 @ 16:18), 19 (01-26 @ 09:30), 20 (01-26 @ 03:22), 20 (01-25 @ 21:12), 22 (01-25 @ 15:33), 23 (01-25 @ 10:13), 24 (01-24 @ 19:30)  BUN: 11 (01-27 @ 16:41), 15 (01-27 @ 04:36), 18 (01-26 @ 22:37), 19 (01-26 @ 16:18), 15 (01-26 @ 09:30), 15 (01-26 @ 03:22), 16 (01-25 @ 21:12), 15 (01-25 @ 15:33), 12 (01-25 @ 10:13), 15 (01-24 @ 19:30)  Cr: 0.65 (01-27 @ 16:41), 0.82 (01-27 @ 04:36), 0.83 (01-26 @ 22:37), 0.87 (01-26 @ 16:18), 0.80 (01-26 @ 09:30), 0.80 (01-26 @ 03:22), 0.89 (01-25 @ 21:12), 0.93 (01-25 @ 15:33), 0.78 (01-25 @ 10:13), 0.90 (01-24 @ 19:30)  Glu: 195(01-27 @ 16:41), 112(01-27 @ 04:36), 284(01-26 @ 22:37), 349(01-26 @ 16:18), 286(01-26 @ 09:30), 205(01-26 @ 03:22), 248(01-25 @ 21:12), 315(01-25 @ 15:33), 286(01-25 @ 10:13), 176(01-24 @ 19:30)    Hgb: 13.2 (01-27 @ 16:41), 14.4 (01-26 @ 22:37), 15.7 (01-24 @ 19:30)  Hct: 39.2 (01-27 @ 16:41), 43.0 (01-26 @ 22:37), 48.8 (01-24 @ 19:30)  WBC: 16.33 (01-27 @ 16:41), 16.26 (01-26 @ 22:37), 9.23 (01-24 @ 19:30)  Plt: 106 (01-27 @ 16:41), 108 (01-26 @ 22:37), 140 (01-24 @ 19:30)    INR: 1.15 01-27-22 @ 16:41, 1.09 01-26-22 @ 22:37, 1.02 01-24-22 @ 19:30  PTT: 21.0 01-27-22 @ 16:41, 23.5 01-26-22 @ 22:37, 27.6 01-24-22 @ 19:30    IMAGING:   Recent imaging studies were reviewed.    MEDICATIONS:  acetaminophen     Tablet .. 650 milliGRAM(s) Oral every 6 hours PRN  atorvastatin 20 milliGRAM(s) Oral at bedtime  bisacodyl 5 milliGRAM(s) Oral daily PRN  calcium gluconate IVPB 2 Gram(s) IV Intermittent once  chlorhexidine 0.12% Liquid 15 milliLiter(s) Oral Mucosa every 12 hours  dexAMETHasone     Tablet 8 milliGRAM(s) Oral every 6 hours  dexAMETHasone  IVPB 8 milliGRAM(s) IV Intermittent once  dextrose 40% Gel 15 Gram(s) Oral once  dextrose 50% Injectable 25 Gram(s) IV Push once  dextrose 50% Injectable 12.5 Gram(s) IV Push once  dextrose 50% Injectable 25 Gram(s) IV Push once  fentaNYL    Injectable 25 MICROGram(s) IV Push once  glucagon  Injectable 1 milliGRAM(s) IntraMuscular once  insulin glargine Injectable (LANTUS) 20 Unit(s) SubCutaneous at bedtime  insulin lispro (ADMELOG) corrective regimen sliding scale   SubCutaneous Before meals and at bedtime  insulin lispro Injectable (ADMELOG) 12 Unit(s) SubCutaneous three times a day before meals  lacosamide 200 milliGRAM(s) Oral two times a day  lacosamide IVPB 200 milliGRAM(s) IV Intermittent once  levETIRAcetam  IVPB 1000 milliGRAM(s) IV Intermittent every 12 hours  lisinopril 2.5 milliGRAM(s) Oral daily  nafcillin  IVPB 1 Gram(s) IV Intermittent every 6 hours  pantoprazole    Tablet 40 milliGRAM(s) Oral before breakfast  polyethylene glycol 3350 17 Gram(s) Oral every 12 hours  potassium chloride  10 mEq/100 mL IVPB 10 milliEquivalent(s) IV Intermittent every 1 hour  senna 2 Tablet(s) Oral at bedtime  sodium chloride 0.9%. 1000 milliLiter(s) IV Continuous <Continuous>    PHYSICAL EXAM:  General: intubated  CVS: RRR  Pulm: CTAB, intubated   GI: Soft, NTND  Extremities: No LE Edema  Neuro: GCS [id number], pupils 3 mm reactive bilateral, Localizing with both upper limbs Right>left, withdrawing with LE against gravity

## 2022-01-27 NOTE — PROGRESS NOTE ADULT - ASSESSMENT
Osiel Norwood    66M hx HTN/DM was [place] the [place] for [place] early January, was confused and not answering appropriately, MRI Jan 7 showed L frontal enhancing mass with significant edema.   -Adm NSCU  -Dex 4q6  -Keppra  -Preop LISY Holbrook @ 5:30

## 2022-01-27 NOTE — PROGRESS NOTE ADULT - ASSESSMENT
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   ASSESSMENT/PLAN:  66M presenting with brain mass with significant vasogenic edema with brain compression and midline shift     NEURO:  -NC Q 1 hr  - dex 8q6h for vasogenic edema and HTS per below  - Vimpat 100mg BID for sz ppx   -Keppra 1000 MG bid  - Gliolan light precautions for 48 hrs  -CT head tomorrow morning  -MRI post op  -v EEG    CVS:  - SBP goal 100-160  - lisinopril 2.5 mg OD    PULM:  - Intubated, ventilated  - CPAP at tolerated   - Aspiration precautions    RENAL:  - Fluids: NS at 100ml/hr  -Na 145 AT 16:00 today  - na goal 140-150  - daily IOs  - BMP q8h    GI:  - Diet: NPO  - GI prophylaxis: PPI while on steroids  - Bowel regimen: miralax, senna    ENDO:   -Hyperglycemia, to continue monitoring  - FS goal 120-180  -iss  -20 Lantus     HEME/ONC:  - SCDs  - Chemoppx: SQL; hold fresh post op    ID:  - monitor for fevers    Full code    ICU      Patient is at high risk of neurologic deterioration/death due to:  cerebral edema, brain compression, seizures

## 2022-01-28 LAB
ALBUMIN SERPL ELPH-MCNC: 3.4 G/DL — SIGNIFICANT CHANGE UP (ref 3.3–5)
ALBUMIN SERPL ELPH-MCNC: 3.4 G/DL — SIGNIFICANT CHANGE UP (ref 3.3–5)
ALP SERPL-CCNC: 52 U/L — SIGNIFICANT CHANGE UP (ref 40–120)
ALP SERPL-CCNC: 54 U/L — SIGNIFICANT CHANGE UP (ref 40–120)
ALT FLD-CCNC: 16 U/L — SIGNIFICANT CHANGE UP (ref 10–45)
ALT FLD-CCNC: 17 U/L — SIGNIFICANT CHANGE UP (ref 10–45)
ANION GAP SERPL CALC-SCNC: 12 MMOL/L — SIGNIFICANT CHANGE UP (ref 5–17)
ANION GAP SERPL CALC-SCNC: 16 MMOL/L — SIGNIFICANT CHANGE UP (ref 5–17)
AST SERPL-CCNC: 16 U/L — SIGNIFICANT CHANGE UP (ref 10–40)
AST SERPL-CCNC: 16 U/L — SIGNIFICANT CHANGE UP (ref 10–40)
BILIRUB DIRECT SERPL-MCNC: 0.3 MG/DL — SIGNIFICANT CHANGE UP (ref 0–0.3)
BILIRUB DIRECT SERPL-MCNC: 0.4 MG/DL — HIGH (ref 0–0.3)
BILIRUB INDIRECT FLD-MCNC: 0.9 MG/DL — SIGNIFICANT CHANGE UP (ref 0.2–1)
BILIRUB INDIRECT FLD-MCNC: 1.3 MG/DL — HIGH (ref 0.2–1)
BILIRUB SERPL-MCNC: 1.2 MG/DL — SIGNIFICANT CHANGE UP (ref 0.2–1.2)
BILIRUB SERPL-MCNC: 1.7 MG/DL — HIGH (ref 0.2–1.2)
BUN SERPL-MCNC: 12 MG/DL — SIGNIFICANT CHANGE UP (ref 7–23)
BUN SERPL-MCNC: 12 MG/DL — SIGNIFICANT CHANGE UP (ref 7–23)
CALCIUM SERPL-MCNC: 8.6 MG/DL — SIGNIFICANT CHANGE UP (ref 8.4–10.5)
CALCIUM SERPL-MCNC: 8.7 MG/DL — SIGNIFICANT CHANGE UP (ref 8.4–10.5)
CHLORIDE SERPL-SCNC: 103 MMOL/L — SIGNIFICANT CHANGE UP (ref 96–108)
CHLORIDE SERPL-SCNC: 103 MMOL/L — SIGNIFICANT CHANGE UP (ref 96–108)
CO2 SERPL-SCNC: 19 MMOL/L — LOW (ref 22–31)
CO2 SERPL-SCNC: 22 MMOL/L — SIGNIFICANT CHANGE UP (ref 22–31)
CREAT SERPL-MCNC: 0.62 MG/DL — SIGNIFICANT CHANGE UP (ref 0.5–1.3)
CREAT SERPL-MCNC: 0.8 MG/DL — SIGNIFICANT CHANGE UP (ref 0.5–1.3)
GLUCOSE BLDC GLUCOMTR-MCNC: 195 MG/DL — HIGH (ref 70–99)
GLUCOSE BLDC GLUCOMTR-MCNC: 223 MG/DL — HIGH (ref 70–99)
GLUCOSE BLDC GLUCOMTR-MCNC: 225 MG/DL — HIGH (ref 70–99)
GLUCOSE BLDC GLUCOMTR-MCNC: 240 MG/DL — HIGH (ref 70–99)
GLUCOSE BLDC GLUCOMTR-MCNC: 245 MG/DL — HIGH (ref 70–99)
GLUCOSE SERPL-MCNC: 238 MG/DL — HIGH (ref 70–99)
GLUCOSE SERPL-MCNC: 285 MG/DL — HIGH (ref 70–99)
HCT VFR BLD CALC: 38.6 % — LOW (ref 39–50)
HCT VFR BLD CALC: 38.7 % — LOW (ref 39–50)
HGB BLD-MCNC: 12.8 G/DL — LOW (ref 13–17)
HGB BLD-MCNC: 12.9 G/DL — LOW (ref 13–17)
MAGNESIUM SERPL-MCNC: 1.8 MG/DL — SIGNIFICANT CHANGE UP (ref 1.6–2.6)
MAGNESIUM SERPL-MCNC: 1.9 MG/DL — SIGNIFICANT CHANGE UP (ref 1.6–2.6)
MCHC RBC-ENTMCNC: 30.8 PG — SIGNIFICANT CHANGE UP (ref 27–34)
MCHC RBC-ENTMCNC: 30.8 PG — SIGNIFICANT CHANGE UP (ref 27–34)
MCHC RBC-ENTMCNC: 33.2 GM/DL — SIGNIFICANT CHANGE UP (ref 32–36)
MCHC RBC-ENTMCNC: 33.3 GM/DL — SIGNIFICANT CHANGE UP (ref 32–36)
MCV RBC AUTO: 92.4 FL — SIGNIFICANT CHANGE UP (ref 80–100)
MCV RBC AUTO: 92.8 FL — SIGNIFICANT CHANGE UP (ref 80–100)
NRBC # BLD: 0 /100 WBCS — SIGNIFICANT CHANGE UP (ref 0–0)
NRBC # BLD: 0 /100 WBCS — SIGNIFICANT CHANGE UP (ref 0–0)
PHOSPHATE SERPL-MCNC: 3.5 MG/DL — SIGNIFICANT CHANGE UP (ref 2.5–4.5)
PHOSPHATE SERPL-MCNC: 4.4 MG/DL — SIGNIFICANT CHANGE UP (ref 2.5–4.5)
PLATELET # BLD AUTO: 106 K/UL — LOW (ref 150–400)
PLATELET # BLD AUTO: 109 K/UL — LOW (ref 150–400)
POTASSIUM SERPL-MCNC: 3.9 MMOL/L — SIGNIFICANT CHANGE UP (ref 3.5–5.3)
POTASSIUM SERPL-MCNC: 4.3 MMOL/L — SIGNIFICANT CHANGE UP (ref 3.5–5.3)
POTASSIUM SERPL-SCNC: 3.9 MMOL/L — SIGNIFICANT CHANGE UP (ref 3.5–5.3)
POTASSIUM SERPL-SCNC: 4.3 MMOL/L — SIGNIFICANT CHANGE UP (ref 3.5–5.3)
PROT SERPL-MCNC: 5.7 G/DL — LOW (ref 6–8.3)
PROT SERPL-MCNC: 5.8 G/DL — LOW (ref 6–8.3)
RBC # BLD: 4.16 M/UL — LOW (ref 4.2–5.8)
RBC # BLD: 4.19 M/UL — LOW (ref 4.2–5.8)
RBC # FLD: 11.7 % — SIGNIFICANT CHANGE UP (ref 10.3–14.5)
RBC # FLD: 11.9 % — SIGNIFICANT CHANGE UP (ref 10.3–14.5)
SODIUM SERPL-SCNC: 137 MMOL/L — SIGNIFICANT CHANGE UP (ref 135–145)
SODIUM SERPL-SCNC: 138 MMOL/L — SIGNIFICANT CHANGE UP (ref 135–145)
WBC # BLD: 14.33 K/UL — HIGH (ref 3.8–10.5)
WBC # BLD: 14.71 K/UL — HIGH (ref 3.8–10.5)
WBC # FLD AUTO: 14.33 K/UL — HIGH (ref 3.8–10.5)
WBC # FLD AUTO: 14.71 K/UL — HIGH (ref 3.8–10.5)

## 2022-01-28 PROCEDURE — 99291 CRITICAL CARE FIRST HOUR: CPT

## 2022-01-28 PROCEDURE — 95720 EEG PHY/QHP EA INCR W/VEEG: CPT

## 2022-01-28 PROCEDURE — 70450 CT HEAD/BRAIN W/O DYE: CPT | Mod: 26

## 2022-01-28 RX ORDER — NICARDIPINE HYDROCHLORIDE 30 MG/1
5 CAPSULE, EXTENDED RELEASE ORAL
Qty: 40 | Refills: 0 | Status: DISCONTINUED | OUTPATIENT
Start: 2022-01-28 | End: 2022-01-29

## 2022-01-28 RX ORDER — MAGNESIUM SULFATE 500 MG/ML
2 VIAL (ML) INJECTION ONCE
Refills: 0 | Status: COMPLETED | OUTPATIENT
Start: 2022-01-28 | End: 2022-01-28

## 2022-01-28 RX ORDER — LISINOPRIL 2.5 MG/1
5 TABLET ORAL DAILY
Refills: 0 | Status: DISCONTINUED | OUTPATIENT
Start: 2022-01-28 | End: 2022-01-30

## 2022-01-28 RX ORDER — POTASSIUM CHLORIDE 20 MEQ
10 PACKET (EA) ORAL ONCE
Refills: 0 | Status: COMPLETED | OUTPATIENT
Start: 2022-01-28 | End: 2022-01-28

## 2022-01-28 RX ORDER — HYDRALAZINE HCL 50 MG
10 TABLET ORAL ONCE
Refills: 0 | Status: COMPLETED | OUTPATIENT
Start: 2022-01-28 | End: 2022-01-28

## 2022-01-28 RX ORDER — LISINOPRIL 2.5 MG/1
2.5 TABLET ORAL ONCE
Refills: 0 | Status: COMPLETED | OUTPATIENT
Start: 2022-01-28 | End: 2022-01-28

## 2022-01-28 RX ORDER — SODIUM CHLORIDE 5 G/100ML
1000 INJECTION, SOLUTION INTRAVENOUS
Refills: 0 | Status: DISCONTINUED | OUTPATIENT
Start: 2022-01-28 | End: 2022-02-01

## 2022-01-28 RX ORDER — LABETALOL HCL 100 MG
10 TABLET ORAL ONCE
Refills: 0 | Status: COMPLETED | OUTPATIENT
Start: 2022-01-28 | End: 2022-01-28

## 2022-01-28 RX ORDER — INSULIN LISPRO 100/ML
VIAL (ML) SUBCUTANEOUS EVERY 6 HOURS
Refills: 0 | Status: DISCONTINUED | OUTPATIENT
Start: 2022-01-28 | End: 2022-01-29

## 2022-01-28 RX ORDER — DEXAMETHASONE 0.5 MG/5ML
4 ELIXIR ORAL EVERY 6 HOURS
Refills: 0 | Status: DISCONTINUED | OUTPATIENT
Start: 2022-01-28 | End: 2022-01-29

## 2022-01-28 RX ORDER — DEXAMETHASONE 0.5 MG/5ML
8 ELIXIR ORAL ONCE
Refills: 0 | Status: COMPLETED | OUTPATIENT
Start: 2022-01-28 | End: 2022-01-28

## 2022-01-28 RX ADMIN — Medication 8 MILLIGRAM(S): at 12:27

## 2022-01-28 RX ADMIN — ATORVASTATIN CALCIUM 20 MILLIGRAM(S): 80 TABLET, FILM COATED ORAL at 21:14

## 2022-01-28 RX ADMIN — Medication 2: at 23:52

## 2022-01-28 RX ADMIN — Medication 4 MILLIGRAM(S): at 17:50

## 2022-01-28 RX ADMIN — Medication 8 MILLIGRAM(S): at 05:43

## 2022-01-28 RX ADMIN — NAFCILLIN 100 GRAM(S): 10 INJECTION, POWDER, FOR SOLUTION INTRAVENOUS at 14:23

## 2022-01-28 RX ADMIN — LISINOPRIL 2.5 MILLIGRAM(S): 2.5 TABLET ORAL at 21:54

## 2022-01-28 RX ADMIN — Medication 1: at 17:40

## 2022-01-28 RX ADMIN — POLYETHYLENE GLYCOL 3350 17 GRAM(S): 17 POWDER, FOR SOLUTION ORAL at 17:50

## 2022-01-28 RX ADMIN — LACOSAMIDE 200 MILLIGRAM(S): 50 TABLET ORAL at 05:43

## 2022-01-28 RX ADMIN — CHLORHEXIDINE GLUCONATE 15 MILLILITER(S): 213 SOLUTION TOPICAL at 05:42

## 2022-01-28 RX ADMIN — LACOSAMIDE 200 MILLIGRAM(S): 50 TABLET ORAL at 17:51

## 2022-01-28 RX ADMIN — Medication 10 MILLIGRAM(S): at 19:30

## 2022-01-28 RX ADMIN — Medication 100 MILLIEQUIVALENT(S): at 21:14

## 2022-01-28 RX ADMIN — LEVETIRACETAM 400 MILLIGRAM(S): 250 TABLET, FILM COATED ORAL at 05:43

## 2022-01-28 RX ADMIN — INSULIN GLARGINE 20 UNIT(S): 100 INJECTION, SOLUTION SUBCUTANEOUS at 21:37

## 2022-01-28 RX ADMIN — Medication 25 GRAM(S): at 05:43

## 2022-01-28 RX ADMIN — Medication 4: at 07:57

## 2022-01-28 RX ADMIN — Medication 4 MILLIGRAM(S): at 23:53

## 2022-01-28 RX ADMIN — SENNA PLUS 2 TABLET(S): 8.6 TABLET ORAL at 21:14

## 2022-01-28 RX ADMIN — CHLORHEXIDINE GLUCONATE 15 MILLILITER(S): 213 SOLUTION TOPICAL at 17:50

## 2022-01-28 RX ADMIN — LISINOPRIL 2.5 MILLIGRAM(S): 2.5 TABLET ORAL at 05:42

## 2022-01-28 RX ADMIN — NAFCILLIN 100 GRAM(S): 10 INJECTION, POWDER, FOR SOLUTION INTRAVENOUS at 05:42

## 2022-01-28 RX ADMIN — Medication 2: at 12:34

## 2022-01-28 NOTE — PROGRESS NOTE ADULT - SUBJECTIVE AND OBJECTIVE BOX
HPI:  66M hx HTN/DM was [place] early January, was confused and not answering appropriately, MRI Jan 7 showed L frontal enhancing mass with significant edema. Repeat CTH showed significant vasogenic edema and 1.5cm MLS  - POD1 L crani for GBM with gleolan  - after OR b/l LE shaking concerning for seizure, CTH stable, given ativan, loaded with keppra and continued with vimpat    EVENTS:   No acute events .    VITALS:  T(C): , Max: 37.3 (01-28-22 @ 19:00)  HR:  (71 - 87)  BP: --  ABP:  (127/58 - 190/78)  RR:  (14 - 22)  SpO2:  (97% - 100%)  Wt(kg): --  Device: Avea, Mode: CPAP with PS, FiO2: 40, PEEP: 5, PS: 5, MAP: 8, PIP: 11    01-27-22 @ 07:01  -  01-28-22 @ 07:00  --------------------------------------------------------  IN: 2450 mL / OUT: 1420 mL / NET: 1030 mL    01-28-22 @ 07:01  -  01-28-22 @ 20:06  --------------------------------------------------------  IN: 1250 mL / OUT: 1945 mL / NET: -695 mL      LABS:  Na: 138 (01-28 @ 00:19), 145 (01-27 @ 16:41), 143 (01-27 @ 04:36), 140 (01-26 @ 22:37), 137 (01-26 @ 16:18), 139 (01-26 @ 09:30), 140 (01-26 @ 03:22), 134 (01-25 @ 21:12)  K: 4.3 (01-28 @ 00:19), 3.3 (01-27 @ 16:41), 3.6 (01-27 @ 04:36), 3.9 (01-26 @ 22:37), 4.6 (01-26 @ 16:18), 4.1 (01-26 @ 09:30), 4.0 (01-26 @ 03:22), 4.0 (01-25 @ 21:12)  Cl: 103 (01-28 @ 00:19), 108 (01-27 @ 16:41), 107 (01-27 @ 04:36), 107 (01-26 @ 22:37), 105 (01-26 @ 16:18), 106 (01-26 @ 09:30), 107 (01-26 @ 03:22), 104 (01-25 @ 21:12)  CO2: 19 (01-28 @ 00:19), 17 (01-27 @ 16:41), 22 (01-27 @ 04:36), 19 (01-26 @ 22:37), 16 (01-26 @ 16:18), 19 (01-26 @ 09:30), 20 (01-26 @ 03:22), 20 (01-25 @ 21:12)  BUN: 12 (01-28 @ 00:19), 11 (01-27 @ 16:41), 15 (01-27 @ 04:36), 18 (01-26 @ 22:37), 19 (01-26 @ 16:18), 15 (01-26 @ 09:30), 15 (01-26 @ 03:22), 16 (01-25 @ 21:12)  Cr: 0.80 (01-28 @ 00:19), 0.65 (01-27 @ 16:41), 0.82 (01-27 @ 04:36), 0.83 (01-26 @ 22:37), 0.87 (01-26 @ 16:18), 0.80 (01-26 @ 09:30), 0.80 (01-26 @ 03:22), 0.89 (01-25 @ 21:12)  Glu: 285(01-28 @ 00:19), 195(01-27 @ 16:41), 112(01-27 @ 04:36), 284(01-26 @ 22:37), 349(01-26 @ 16:18), 286(01-26 @ 09:30), 205(01-26 @ 03:22), 248(01-25 @ 21:12)    Hgb: 12.8 (01-28 @ 00:18), 13.2 (01-27 @ 16:41), 14.4 (01-26 @ 22:37)  Hct: 38.6 (01-28 @ 00:18), 39.2 (01-27 @ 16:41), 43.0 (01-26 @ 22:37)  WBC: 14.71 (01-28 @ 00:18), 16.33 (01-27 @ 16:41), 16.26 (01-26 @ 22:37)  Plt: 109 (01-28 @ 00:18), 106 (01-27 @ 16:41), 108 (01-26 @ 22:37)    INR: 1.15 01-27-22 @ 16:41, 1.09 01-26-22 @ 22:37  PTT: 21.0 01-27-22 @ 16:41, 23.5 01-26-22 @ 22:37    IMAGING:   Recent imaging studies were reviewed.    MEDICATIONS:  acetaminophen     Tablet .. 650 milliGRAM(s) Oral every 6 hours PRN  atorvastatin 20 milliGRAM(s) Oral at bedtime  bisacodyl 5 milliGRAM(s) Oral daily PRN  chlorhexidine 0.12% Liquid 15 milliLiter(s) Oral Mucosa every 12 hours  dexAMETHasone     Tablet 4 milliGRAM(s) Oral every 6 hours  dextrose 50% Injectable 12.5 Gram(s) IV Push once  dextrose 50% Injectable 25 Gram(s) IV Push once  glucagon  Injectable 1 milliGRAM(s) IntraMuscular once  insulin glargine Injectable (LANTUS) 20 Unit(s) SubCutaneous at bedtime  insulin lispro (ADMELOG) corrective regimen sliding scale   SubCutaneous every 6 hours  lacosamide 200 milliGRAM(s) Oral two times a day  lisinopril 5 milliGRAM(s) Oral daily  polyethylene glycol 3350 17 Gram(s) Oral every 12 hours  senna 2 Tablet(s) Oral at bedtime  sodium chloride 2% . 1000 milliLiter(s) IV Continuous <Continuous>    PHYSICAL EXAM:  General: intubated  CVS: RRR  Pulm: CTAB  GI: Soft, NTND  Extremities: No LE Edema  Neuro: intubated, EO, pupils 3mm reactive, no fc, b/l UE localize L>R, ble wd

## 2022-01-28 NOTE — PROGRESS NOTE ADULT - SUBJECTIVE AND OBJECTIVE BOX
Patient seen and examined at bedside.    --Anticoagulation--    T(C): 37.2 (01-27-22 @ 23:00), Max: 37.2 (01-27-22 @ 23:00)  HR: 85 (01-28-22 @ 01:00) (58 - 85)  BP: 107/70 (01-27-22 @ 07:02) (107/70 - 122/74)  RR: 19 (01-28-22 @ 01:00) (13 - 20)  SpO2: 98% (01-28-22 @ 01:00) (88% - 100%)  Wt(kg): --    Exam:    Intubated, EOV, no FC, RUE localizes antigravity however it is weaker compared to LUE. BLE WD.

## 2022-01-28 NOTE — PROGRESS NOTE ADULT - SUBJECTIVE AND OBJECTIVE BOX
NSCU Progress Note    Assessment/Hospital Course:    66M hx HTN/DM was [place] the [place] for vacation early January, was confused and not answering appropriately, MRI Jan 7 showed L frontal enhancing mass with significant edema. Repeat CTH showed significant vasogenic edema and 1.5cm MLS    24 Hour Events/Subjective:  - POD1 L crani for GBM with gleolan  - after OR b/l LE shaking concerning for seizure, CTH stable, given ativan, loaded with keppra and continued with vimpat  - on EEG, no sz overnight    VITALS:   -Reviewed      IMAGING/DATA:   - Reviewed      PHYSICAL EXAM:  General: intubated  CVS: RRR  Pulm: CTAB  GI: Soft, NTND  Extremities: No LE Edema  Neuro: intubated, EO, pupils 3mm reactive, no fc, b/l UE localize L>R, ble wd NSCU Progress Note    Assessment/Hospital Course:    66M hx HTN/DM was [place] the [place] early January, was confused and not answering appropriately, MRI Jan 7 showed L frontal enhancing mass with significant edema. Repeat CTH showed significant vasogenic edema and 1.5cm MLS    24 Hour Events/Subjective:  - POD1 L crani for GBM with gleolan  - after OR b/l LE shaking concerning for seizure, CTH stable, given ativan, loaded with keppra and continued with vimpat  - on EEG, no sz overnight  - CPAP 5/5 this am    VITALS:   -Reviewed      IMAGING/DATA:   - Reviewed      PHYSICAL EXAM:  General: intubated  CVS: RRR  Pulm: CTAB  GI: Soft, NTND  Extremities: No LE Edema  Neuro: intubated, EO, pupils 3mm reactive, no fc, b/l UE localize L>R, ble wd

## 2022-01-28 NOTE — PROGRESS NOTE ADULT - ASSESSMENT
ASSESSMENT/PLAN:    66M presenting with brain mass with significant vasogenic edema with brain compression and midline shift     NEURO:  - neurochecks q1h  - dex 4q6h for vasogenic edema and HTS per below  - Vimpat 100mg BID for sz ppx (transitioned from keppra for behavior)  - Keppra 1g BID (started 1/28 for seizure like activity, BLE shaking)  - c/w vEEG for 48h  - dark protocol 48h  - CTH today  - MRI  - pain control    CVS:  - SBP goal 100-160  - lisinopril    PULM:  - ETT to vent  - wean to extubate      RENAL:  - Fluids: NS 100cc/h NS while NPO  - na goal 140-150  - daily IOs  - BMP q8h    GI:  - Diet: NPO for possible extubation  - GI prophylaxis: PPI while on steroids  - Bowel regimen: miralax, senna    ENDO:   - FS goal 120-180    HEME/ONC:  - SCDs  - Chemoppx: hold for fresh post op    ID:  - monitor for fevers      Patient is at high risk of neurologic deterioration/death due to:  cerebral edema, brain compression, seizures     ASSESSMENT/PLAN:    66M presenting with brain mass with significant vasogenic edema with brain compression and midline shift     NEURO:  - neurochecks q1h  - dex 4q6h for vasogenic edema and HTS per below  - Vimpat 200mg BID for sz ppx (transitioned from keppra for behavior)  - DC keppra 1g BID (started 1/28 for seizure like activity, BLE shaking; unlikely seizure, will monitor on EEG)  - c/w vEEG for 48h  - dark protocol 48h  - CTH today  - MRI  - pain control    CVS:  - SBP goal 100-160  - lisinopril    PULM:  - ETT to vent  - wean to extubate  - CPAP as tolerated  - ABG      RENAL:  - Fluids: HTS 2%@75  - na goal 145-150  - daily IOs  - BMP q8h    GI:  - Diet: NPO for possible extubation  - GI prophylaxis: PPI while on steroids  - Bowel regimen: miralax, senna    ENDO:   - FS goal 120-180  - Ena while on ccs  - will add basal insulin once feeds start    HEME/ONC:  - SCDs  - Chemoppx: hold d/t small post op heme    ID:  - monitor for fevers      Patient is at high risk of neurologic deterioration/death due to:  cerebral edema, brain compression, seizures     ASSESSMENT/PLAN:    66M presenting with brain mass with significant vasogenic edema with brain compression and midline shift     NEURO:  - neurochecks q1h  - dex 4q6h for vasogenic edema and HTS per below  - Vimpat 200mg BID for sz ppx (transitioned from keppra for behavior)  - DC keppra 1g BID (started 1/28 for seizure like activity, BLE shaking; unlikely seizure, will monitor on EEG)  - c/w vEEG for 48h  - dark protocol 48h  - CTH today  - MRI  - pain control    CVS:  - SBP goal 100-160  - lisinopril    PULM:  - ETT to vent  - wean to extubate; will keep intubatd today d/t swelling on CTH and attempt tomorrow  - CPAP as tolerated  - ABG      RENAL:  - Fluids: HTS 2%@75  - na goal 145-150  - daily IOs  - BMP q8h    GI:  - Diet: NPO for possible extubation  - GI prophylaxis: PPI while on steroids  - Bowel regimen: miralax, senna    ENDO:   - FS goal 120-180  - Ena while on ccs  - will add basal insulin once feeds start    HEME/ONC:  - SCDs  - Chemoppx: hold d/t small post op heme    ID:  - monitor for fevers      Patient is at high risk of neurologic deterioration/death due to:  cerebral edema, brain compression, seizures

## 2022-01-28 NOTE — EEG REPORT - NS EEG TEXT BOX
[facility]   COMPREHENSIVE EPILEPSY CENTER   REPORT OF LONG-TERM VIDEO EEG     [facility]: [address], [place] [address], Ph#: [phone]  [facility]: [id number] [facility] [address] [place] [address], Ph#: [phone]  [facility]: [address] [place] [address], Ph#: [phone]    Patient Name: JORDAN CHAKRABORTY  Age and : 66y (57)  MRN #: [id number]  Location: [facility] 03  Referring Physician: Parker Gupta    Start Time/Date:  on 22  End Time/Date: 08 on 22   Duration: 13H 18m    _____________________________________________________________  STUDY INFORMATION    EEG Recording Technique:  The patient underwent continuous Video-EEG monitoring, using Telemetry System hardware on the XLTek Digital System. EEG and video data were stored on a computer hard drive with important events saved in digital archive files. The material was reviewed by a physician (electroencephalographer / epileptologist) on a daily basis. Nilson and seizure detection algorithms were utilized and reviewed. An EEG Technician attended to the patient, and was available throughout daytime work hours.  The epilepsy center neurologist was available in person or on call 24-hours per day.    EEG Placement and Labeling of Electrodes:  The EEG was performed utilizing 20 channel referential EEG connections (coronal over temporal over parasagittal montage) using all standard 10-20 electrode placements with EKG, with additional electrodes placed in the inferior temporal region using the modified 10-10 montage electrode placements for elective admissions, or if deemed necessary. Recording was at a sampling rate of 256 samples per second per channel. Time synchronized digital video recording was done simultaneously with EEG recording. A low light infrared camera was used for low light recording.     _____________________________________________________________  HISTORY    Patient is a 66M hx HTN/DM [place] early January, was confused and not answering appropriately, MRI  showed L frontal enhancing mass with significant edema. Repeat CTH showed significant vasogenic edema and 1.5cm midline shift. Went to OR on 22 for left craniotomy for resection of tumor. After OR, there was an episode of bilateral LE shaking concerning for seizure, CTH stable, given ativan, loaded with keppra and continued with vimpat    PERTINENT MEDICATION:  fentaNYL    Injectable: 25 MICROGram(s) IV Push ( @ 20:23)  lacosamide: 200 milliGRAM(s) Oral ( @ 05:43)  lacosamide IVPB: 140 mL/Hr IV Intermittent ( @ 18:37)  levETIRAcetam  IVPB: 400 mL/Hr IV Intermittent ( @ 17:00),  400 mL/Hr IV Intermittent ( @ 05:43)  LORazepam   Injectable: 2 milliGRAM(s) IV Push ( @ 16:21)  LORazepam   Injectable: 1 milliGRAM(s) IV Push ( @ 16:27)    _____________________________________________________________  STUDY INTERPRETATION    Findings: The background was continuous and reactive. The background predominantly consisted of theta, delta and faster activities. No clear wakefulness or posterior dominant rhythm seen.    Background Slowing:  Background predominantly consisted of theta, delta and faster activities.    Focal Slowing:   Near continuous theta/delta slowing in the left parasaggital region, at times rhythmic occurring at 1Hz frequency.    Sleep Background:  Drowsiness was characterized by fragmentation, attenuation, and slowing of the background activity.    Stage II sleep transients were not well appreciated.    Other Non-Epileptiform Findings:  Excess diffuse fast activity.     Interictal Epileptiform Activity:   Occasional right temporal sharp waves (maximal at P8), at times with a field more anterior, were present.    Events:  Clinical events: None recorded.  Seizures: None recorded.    Activation Procedures:   Hyperventilation was not performed.    Photic stimulation was not performed.     Artifacts:  Intermittent myogenic and movement artifacts were noted.    ECG:  The single channel ECG was not appropriately reflecting heart rate.    _____________________________________________________________  EEG SUMMARY/CLASSIFICATION    Abnormal EEG in an altered patient.  - Occasional right temporal sharp waves (maximal at P8), at times with a field more anterior, were present.  - Near continuous theta/delta slowing in the left parasaggital region, at times rhythmic occurring at 1Hz frequency.  - Moderate-severe generalized slowing    _____________________________________________________________  EEG IMPRESSION/CLINICAL CORRELATE    Abnormal EEG study.  1. Potential epileptogenic focus over the right posterior temporal region  2. Structural or functional abnormality in the left parasaggital region  3. Moderate-severe nonspecific diffuse or multifocal cerebral dysfunction.     No seizure seen.    _____________________________________________________________    Sabrina Isidro MD  Epilepsy Fellow       *******************THIS IS A PRELIMINARY EEG REPORT PENDING ATTENDING REVIEW ******************* [facility]   COMPREHENSIVE EPILEPSY CENTER   REPORT OF LONG-TERM VIDEO EEG     [facility]: [address], [place] [address], Ph#: [phone]  [facility]: [id number] [place] [address], Ph#: [phone]  [facility]: [address] [place] [address], Ph#: [phone]    Patient Name: JORDAN CHAKRABORTY  Age and : 66y (57)  MRN #: [id number]  Location: [facility] 03  Referring Physician: Parker Gupta    Start Time/Date:  on 22  End Time/Date: 08 on 22   Duration: 13H 18m    _____________________________________________________________  STUDY INFORMATION    EEG Recording Technique:  The patient underwent continuous Video-EEG monitoring, using Telemetry System hardware on the XLTek Digital System. EEG and video data were stored on a computer hard drive with important events saved in digital archive files. The material was reviewed by a physician (electroencephalographer / epileptologist) on a daily basis. Nilson and seizure detection algorithms were utilized and reviewed. An EEG Technician attended to the patient, and was available throughout daytime work hours.  The epilepsy center neurologist was available in person or on call 24-hours per day.    EEG Placement and Labeling of Electrodes:  The EEG was performed utilizing 20 channel referential EEG connections (coronal over temporal over parasagittal montage) using all standard 10-20 electrode placements with EKG, with additional electrodes placed in the inferior temporal region using the modified 10-10 montage electrode placements for elective admissions, or if deemed necessary. Recording was at a sampling rate of 256 samples per second per channel. Time synchronized digital video recording was done simultaneously with EEG recording. A low light infrared camera was used for low light recording.     _____________________________________________________________  HISTORY    Patient is a 66M hx HTN/DM [place] early January, was confused and not answering appropriately, MRI  showed L frontal enhancing mass with significant edema. Repeat CTH showed significant vasogenic edema and 1.5cm midline shift. Went to OR on 22 for left craniotomy for resection of tumor. After OR, there was an episode of bilateral LE shaking concerning for seizure, CTH stable, given ativan, loaded with keppra and continued with vimpat    PERTINENT MEDICATION:  fentaNYL    Injectable: 25 MICROGram(s) IV Push ( @ 20:23)  lacosamide: 200 milliGRAM(s) Oral ( @ 05:43)  lacosamide IVPB: 140 mL/Hr IV Intermittent ( @ 18:37)  levETIRAcetam  IVPB: 400 mL/Hr IV Intermittent ( @ 17:00),  400 mL/Hr IV Intermittent ( @ 05:43)  LORazepam   Injectable: 2 milliGRAM(s) IV Push ( @ 16:21)  LORazepam   Injectable: 1 milliGRAM(s) IV Push ( @ 16:27)    _____________________________________________________________  STUDY INTERPRETATION    Findings: The background was continuous and reactive. The background predominantly consisted of theta, delta and faster activities. No clear wakefulness or posterior dominant rhythm seen.    Background Slowing:  Background predominantly consisted of theta, delta and faster activities.    Focal Slowing:   Near continuous theta/delta slowing in the left parasaggital region, at times rhythmic occurring at 1Hz frequency.    Sleep Background:  Drowsiness was characterized by fragmentation, attenuation, and slowing of the background activity.    Stage II sleep transients were not well appreciated.    Other Non-Epileptiform Findings:  Excess diffuse fast activity.     Interictal Epileptiform Activity:   Occasional right temporal sharp waves (maximal at P8), at times with a field more anterior, were present.    Events:  Clinical events: None recorded.  Seizures: None recorded.    Activation Procedures:   Hyperventilation was not performed.    Photic stimulation was not performed.     Artifacts:  Intermittent myogenic and movement artifacts were noted.    ECG:  The single channel ECG was not appropriately reflecting heart rate.    _____________________________________________________________  EEG SUMMARY/CLASSIFICATION    Abnormal EEG in an altered patient.  - Occasional right temporal sharp waves (maximal at P8), at times with a field more anterior, were present.  - Near continuous theta/delta slowing in the left parasaggital region, at times rhythmic occurring at 1Hz frequency.  - Moderate-severe generalized slowing  - Excess diffuse fast activity    _____________________________________________________________  EEG IMPRESSION/CLINICAL CORRELATE    Abnormal EEG study.  1. Potential epileptogenic focus over the right posterior temporal region  2. Structural or functional abnormality in the left parasaggital region  3. Moderate-severe nonspecific diffuse or multifocal cerebral dysfunction.   4. Excessive diffuse fast activity can be seen in the setting of certain sedating medications such as benzodiazepines     No seizure seen.    _____________________________________________________________    Sabrina Isidro MD  Epilepsy Fellow       *******************THIS IS A PRELIMINARY EEG REPORT PENDING ATTENDING REVIEW ******************* [facility]   COMPREHENSIVE EPILEPSY CENTER   REPORT OF LONG-TERM VIDEO EEG     [facility]: [address], [place] [address], Ph#: [phone]  [facility]: [id number] [place] [address], Ph#: [phone]  [facility]: [address] [place] [address], Ph#: [phone]    Patient Name: JORDAN CHAKRABORTY  Age and : 66y (57)  MRN #: [id number]  Location: [facility] 03  Referring Physician: Parker Gupta    Start Time/Date:  on 22  End Time/Date: 08 on 22   Duration: 13H 18m    _____________________________________________________________  STUDY INFORMATION    EEG Recording Technique:  The patient underwent continuous Video-EEG monitoring, using Telemetry System hardware on the XLTek Digital System. EEG and video data were stored on a computer hard drive with important events saved in digital archive files. The material was reviewed by a physician (electroencephalographer / epileptologist) on a daily basis. Nilson and seizure detection algorithms were utilized and reviewed. An EEG Technician attended to the patient, and was available throughout daytime work hours.  The epilepsy center neurologist was available in person or on call 24-hours per day.    EEG Placement and Labeling of Electrodes:  The EEG was performed utilizing 20 channel referential EEG connections (coronal over temporal over parasagittal montage) using all standard 10-20 electrode placements with EKG, with additional electrodes placed in the inferior temporal region using the modified 10-10 montage electrode placements for elective admissions, or if deemed necessary. Recording was at a sampling rate of 256 samples per second per channel. Time synchronized digital video recording was done simultaneously with EEG recording. A low light infrared camera was used for low light recording.     _____________________________________________________________  HISTORY    Patient is a 66M hx HTN/DM was [place] early January, was confused and not answering appropriately, MRI  showed L frontal enhancing mass with significant edema. Repeat CTH showed significant vasogenic edema and 1.5cm midline shift. Went to OR on 22 for left craniotomy for resection of tumor. After OR, there was an episode of bilateral LE shaking concerning for seizure, CTH stable, given ativan, loaded with keppra and continued with vimpat    PERTINENT MEDICATION:  fentaNYL    Injectable: 25 MICROGram(s) IV Push ( @ 20:23)  lacosamide: 200 milliGRAM(s) Oral ( @ 05:43)  lacosamide IVPB: 140 mL/Hr IV Intermittent ( @ 18:37)  levETIRAcetam  IVPB: 400 mL/Hr IV Intermittent ( @ 17:00),  400 mL/Hr IV Intermittent ( @ 05:43)  LORazepam   Injectable: 2 milliGRAM(s) IV Push ( @ 16:21)  LORazepam   Injectable: 1 milliGRAM(s) IV Push ( @ 16:27)    _____________________________________________________________  STUDY INTERPRETATION    Findings: The background was continuous and reactive. The background predominantly consisted of theta, delta and faster activities. No clear wakefulness or posterior dominant rhythm seen.    Background Slowing:  Background predominantly consisted of theta, delta and faster activities.    Focal Slowing:   Near continuous theta/delta slowing in the left parasaggital region, at times rhythmic occurring at 1Hz frequency.    Sleep Background:  Drowsiness was characterized by fragmentation, attenuation, and slowing of the background activity.    Bitemporal wickets seen in drowsiness, right greater than left.  Stage II sleep transients were not well appreciated.    Other Non-Epileptiform Findings:  Excess diffuse fast activity.     Interictal Epileptiform Activity:   No clear epileptiform discharges.    Events:  Clinical events: None recorded.  Seizures: None recorded.    Activation Procedures:   Hyperventilation was not performed.    Photic stimulation was not performed.     Artifacts:  Intermittent myogenic and movement artifacts were noted.    ECG:  The single channel ECG was not appropriately reflecting heart rate.    _____________________________________________________________  EEG SUMMARY/CLASSIFICATION    Abnormal EEG in an altered patient.  - Near continuous theta/delta slowing in the left parasaggital region, at times rhythmic occurring at 1Hz frequency.  - Moderate-severe generalized slowing  - Excess diffuse fast activity    _____________________________________________________________  EEG IMPRESSION/CLINICAL CORRELATE    Abnormal EEG study.  1. Structural or functional abnormality in the left parasaggital region  2. Moderate-severe nonspecific diffuse or multifocal cerebral dysfunction.   3. Excessive diffuse fast activity can be seen in the setting of certain sedating medications such as benzodiazepines     No seizure seen.    _____________________________________________________________    Sabrina Isidro MD  Epilepsy Fellow    Bernard Paul MD  EEG/Epilepsy Attending

## 2022-01-28 NOTE — PROGRESS NOTE ADULT - ASSESSMENT
ASSESSMENT/PLAN:    66M presenting with brain mass with significant vasogenic edema with brain compression and midline shift     NEURO:  - neurochecks q1h  - dex 4q6h for vasogenic edema and HTS per below  - Vimpat 200mg BID for sz ppx (transitioned from keppra for behavior)  - DC keppra 1g BID (started 1/28 for seizure like activity, BLE shaking; unlikely seizure, will monitor on EEG)  - c/w vEEG for 48h  - dark protocol 48h  - CTH today  - MRI  - pain control    CVS:  - SBP goal 100-160  - lisinopril    PULM:  - ETT to vent  - wean to extubate; will keep intubatd today d/t swelling on CTH and attempt tomorrow  - CPAP as tolerated  - ABG      RENAL:  - Fluids: HTS 2%@75  - na goal 145-150  - daily IOs  - BMP q8h    GI:  - Diet: NPO for possible extubation  - GI prophylaxis: PPI while on steroids  - Bowel regimen: miralax, senna    ENDO:   - FS goal 120-180  - Ena while on ccs  - will add basal insulin once feeds start    HEME/ONC:  - SCDs  - Chemoppx: hold d/t small post op heme    ID:  - monitor for fevers      Patient is at high risk of neurologic deterioration/death due to:  cerebral edema, brain compression, seizures   ASSESSMENT/PLAN:    66M presenting with brain mass with significant vasogenic edema with brain compression and midline shift     NEURO:  - neurochecks q1h  - dex 4q6h for vasogenic edema and HTS per below  - Vimpat 200mg BID for sz ppx (transitioned from keppra for behavior)  - DC keppra 1g BID (started 1/28 for seizure like activity, BLE shaking; unlikely seizure, will monitor on EEG)  - c/w vEEG for 48h  - dark protocol 48h  - CTH tomorrow  - MRI  - pain control    CVS:  - SBP goal 100-160  - lisinopril  -Cardene gtt  -start Labetalol 100 Q8    PULM:  - ETT to vent  - wean to extubate; will keep intubatd today d/t swelling on CTH and attempt tomorrow  - CPAP as tolerated  - ABG tonight      RENAL:  - Fluids: HTS 2%@75  - na goal 145-150  - daily IOs  - BMP q8h    GI:  - Diet: NPO for possible extubation tomorrow  - GI prophylaxis: PPI while on steroids  - Bowel regimen: miralax, senna    ENDO:   - FS goal 120-180  - Ena while on ccs  - Lantus 20 units    HEME/ONC:  - SCDs  - Chemoppx: hold d/t small post op heme    ID:  - monitor for fevers      Patient is at high risk of neurologic deterioration/death due to:  cerebral edema, brain compression, seizures

## 2022-01-28 NOTE — PROGRESS NOTE ADULT - ASSESSMENT
Osiel Norwood    66M hx HTN/DM was [place] early January, was confused and not answering appropriately, MRI Jan 7 showed L frontal enhancing mass with significant edema.   -s/p L crani for tumor, left intubated for slow wakeup  -Dex  -Keppra  -Repeat CTH AM, prior 2 CTH show good resection, minimal heme  -Dark 48hrs, trend LFTs for gleolan  -Wean to extubate

## 2022-01-29 LAB
ANION GAP SERPL CALC-SCNC: 12 MMOL/L — SIGNIFICANT CHANGE UP (ref 5–17)
ANION GAP SERPL CALC-SCNC: 13 MMOL/L — SIGNIFICANT CHANGE UP (ref 5–17)
ANION GAP SERPL CALC-SCNC: 14 MMOL/L — SIGNIFICANT CHANGE UP (ref 5–17)
BUN SERPL-MCNC: 12 MG/DL — SIGNIFICANT CHANGE UP (ref 7–23)
BUN SERPL-MCNC: 12 MG/DL — SIGNIFICANT CHANGE UP (ref 7–23)
BUN SERPL-MCNC: 16 MG/DL — SIGNIFICANT CHANGE UP (ref 7–23)
CALCIUM SERPL-MCNC: 8.2 MG/DL — LOW (ref 8.4–10.5)
CALCIUM SERPL-MCNC: 8.5 MG/DL — SIGNIFICANT CHANGE UP (ref 8.4–10.5)
CALCIUM SERPL-MCNC: 9.1 MG/DL — SIGNIFICANT CHANGE UP (ref 8.4–10.5)
CHLORIDE SERPL-SCNC: 103 MMOL/L — SIGNIFICANT CHANGE UP (ref 96–108)
CHLORIDE SERPL-SCNC: 104 MMOL/L — SIGNIFICANT CHANGE UP (ref 96–108)
CHLORIDE SERPL-SCNC: 99 MMOL/L — SIGNIFICANT CHANGE UP (ref 96–108)
CO2 SERPL-SCNC: 20 MMOL/L — LOW (ref 22–31)
CO2 SERPL-SCNC: 22 MMOL/L — SIGNIFICANT CHANGE UP (ref 22–31)
CO2 SERPL-SCNC: 22 MMOL/L — SIGNIFICANT CHANGE UP (ref 22–31)
CREAT SERPL-MCNC: 0.54 MG/DL — SIGNIFICANT CHANGE UP (ref 0.5–1.3)
CREAT SERPL-MCNC: 0.54 MG/DL — SIGNIFICANT CHANGE UP (ref 0.5–1.3)
CREAT SERPL-MCNC: 0.55 MG/DL — SIGNIFICANT CHANGE UP (ref 0.5–1.3)
GAS PNL BLDA: SIGNIFICANT CHANGE UP
GAS PNL BLDA: SIGNIFICANT CHANGE UP
GLUCOSE BLDC GLUCOMTR-MCNC: 167 MG/DL — HIGH (ref 70–99)
GLUCOSE BLDC GLUCOMTR-MCNC: 200 MG/DL — HIGH (ref 70–99)
GLUCOSE BLDC GLUCOMTR-MCNC: 208 MG/DL — HIGH (ref 70–99)
GLUCOSE BLDC GLUCOMTR-MCNC: 215 MG/DL — HIGH (ref 70–99)
GLUCOSE BLDC GLUCOMTR-MCNC: 219 MG/DL — HIGH (ref 70–99)
GLUCOSE BLDC GLUCOMTR-MCNC: 225 MG/DL — HIGH (ref 70–99)
GLUCOSE BLDC GLUCOMTR-MCNC: 235 MG/DL — HIGH (ref 70–99)
GLUCOSE BLDC GLUCOMTR-MCNC: 243 MG/DL — HIGH (ref 70–99)
GLUCOSE BLDC GLUCOMTR-MCNC: 282 MG/DL — HIGH (ref 70–99)
GLUCOSE SERPL-MCNC: 219 MG/DL — HIGH (ref 70–99)
GLUCOSE SERPL-MCNC: 245 MG/DL — HIGH (ref 70–99)
GLUCOSE SERPL-MCNC: 258 MG/DL — HIGH (ref 70–99)
MAGNESIUM SERPL-MCNC: 1.9 MG/DL — SIGNIFICANT CHANGE UP (ref 1.6–2.6)
MAGNESIUM SERPL-MCNC: 1.9 MG/DL — SIGNIFICANT CHANGE UP (ref 1.6–2.6)
MAGNESIUM SERPL-MCNC: 2.3 MG/DL — SIGNIFICANT CHANGE UP (ref 1.6–2.6)
PHOSPHATE SERPL-MCNC: 3.1 MG/DL — SIGNIFICANT CHANGE UP (ref 2.5–4.5)
PHOSPHATE SERPL-MCNC: 3.2 MG/DL — SIGNIFICANT CHANGE UP (ref 2.5–4.5)
PHOSPHATE SERPL-MCNC: 3.3 MG/DL — SIGNIFICANT CHANGE UP (ref 2.5–4.5)
POTASSIUM SERPL-MCNC: 4 MMOL/L — SIGNIFICANT CHANGE UP (ref 3.5–5.3)
POTASSIUM SERPL-SCNC: 4 MMOL/L — SIGNIFICANT CHANGE UP (ref 3.5–5.3)
SODIUM SERPL-SCNC: 135 MMOL/L — SIGNIFICANT CHANGE UP (ref 135–145)
SODIUM SERPL-SCNC: 137 MMOL/L — SIGNIFICANT CHANGE UP (ref 135–145)
SODIUM SERPL-SCNC: 137 MMOL/L — SIGNIFICANT CHANGE UP (ref 135–145)

## 2022-01-29 PROCEDURE — 70553 MRI BRAIN STEM W/O & W/DYE: CPT | Mod: 26

## 2022-01-29 PROCEDURE — 70450 CT HEAD/BRAIN W/O DYE: CPT | Mod: 26

## 2022-01-29 PROCEDURE — 99291 CRITICAL CARE FIRST HOUR: CPT

## 2022-01-29 PROCEDURE — 95718 EEG PHYS/QHP 2-12 HR W/VEEG: CPT

## 2022-01-29 PROCEDURE — 71045 X-RAY EXAM CHEST 1 VIEW: CPT | Mod: 26,59

## 2022-01-29 RX ORDER — SODIUM CHLORIDE 9 MG/ML
2 INJECTION INTRAMUSCULAR; INTRAVENOUS; SUBCUTANEOUS EVERY 6 HOURS
Refills: 0 | Status: DISCONTINUED | OUTPATIENT
Start: 2022-01-29 | End: 2022-01-30

## 2022-01-29 RX ORDER — CHLORHEXIDINE GLUCONATE 213 G/1000ML
1 SOLUTION TOPICAL DAILY
Refills: 0 | Status: DISCONTINUED | OUTPATIENT
Start: 2022-01-29 | End: 2022-02-01

## 2022-01-29 RX ORDER — MULTIVIT WITH MIN/MFOLATE/K2 340-15/3 G
1 POWDER (GRAM) ORAL ONCE
Refills: 0 | Status: COMPLETED | OUTPATIENT
Start: 2022-01-29 | End: 2022-01-29

## 2022-01-29 RX ORDER — DEXAMETHASONE 0.5 MG/5ML
4 ELIXIR ORAL EVERY 6 HOURS
Refills: 0 | Status: DISCONTINUED | OUTPATIENT
Start: 2022-01-29 | End: 2022-01-31

## 2022-01-29 RX ORDER — LACOSAMIDE 50 MG/1
200 TABLET ORAL EVERY 12 HOURS
Refills: 0 | Status: DISCONTINUED | OUTPATIENT
Start: 2022-01-29 | End: 2022-01-31

## 2022-01-29 RX ORDER — LABETALOL HCL 100 MG
100 TABLET ORAL EVERY 8 HOURS
Refills: 0 | Status: DISCONTINUED | OUTPATIENT
Start: 2022-01-29 | End: 2022-01-30

## 2022-01-29 RX ORDER — DEXMEDETOMIDINE HYDROCHLORIDE IN 0.9% SODIUM CHLORIDE 4 UG/ML
0.2 INJECTION INTRAVENOUS
Qty: 200 | Refills: 0 | Status: DISCONTINUED | OUTPATIENT
Start: 2022-01-29 | End: 2022-01-29

## 2022-01-29 RX ORDER — SODIUM CHLORIDE 9 MG/ML
1000 INJECTION INTRAMUSCULAR; INTRAVENOUS; SUBCUTANEOUS ONCE
Refills: 0 | Status: DISCONTINUED | OUTPATIENT
Start: 2022-01-29 | End: 2022-01-29

## 2022-01-29 RX ORDER — SODIUM CHLORIDE 9 MG/ML
1500 INJECTION INTRAMUSCULAR; INTRAVENOUS; SUBCUTANEOUS ONCE
Refills: 0 | Status: COMPLETED | OUTPATIENT
Start: 2022-01-29 | End: 2022-01-29

## 2022-01-29 RX ORDER — DEXTROSE 50 % IN WATER 50 %
25 SYRINGE (ML) INTRAVENOUS
Refills: 0 | Status: DISCONTINUED | OUTPATIENT
Start: 2022-01-29 | End: 2022-02-08

## 2022-01-29 RX ORDER — MAGNESIUM SULFATE 500 MG/ML
1 VIAL (ML) INJECTION ONCE
Refills: 0 | Status: COMPLETED | OUTPATIENT
Start: 2022-01-29 | End: 2022-01-29

## 2022-01-29 RX ORDER — DEXTROSE 50 % IN WATER 50 %
50 SYRINGE (ML) INTRAVENOUS
Refills: 0 | Status: DISCONTINUED | OUTPATIENT
Start: 2022-01-29 | End: 2022-02-08

## 2022-01-29 RX ORDER — PANTOPRAZOLE SODIUM 20 MG/1
40 TABLET, DELAYED RELEASE ORAL ONCE
Refills: 0 | Status: COMPLETED | OUTPATIENT
Start: 2022-01-29 | End: 2022-01-29

## 2022-01-29 RX ORDER — INSULIN HUMAN 100 [IU]/ML
5 INJECTION, SOLUTION SUBCUTANEOUS
Qty: 100 | Refills: 0 | Status: DISCONTINUED | OUTPATIENT
Start: 2022-01-29 | End: 2022-01-31

## 2022-01-29 RX ADMIN — Medication 4 MILLIGRAM(S): at 05:31

## 2022-01-29 RX ADMIN — Medication 10 MILLIGRAM(S): at 13:00

## 2022-01-29 RX ADMIN — SENNA PLUS 2 TABLET(S): 8.6 TABLET ORAL at 22:23

## 2022-01-29 RX ADMIN — Medication 4 MILLIGRAM(S): at 13:10

## 2022-01-29 RX ADMIN — Medication 4 MILLIGRAM(S): at 17:12

## 2022-01-29 RX ADMIN — Medication 100 MILLIGRAM(S): at 22:23

## 2022-01-29 RX ADMIN — INSULIN HUMAN 5 UNIT(S)/HR: 100 INJECTION, SOLUTION SUBCUTANEOUS at 17:14

## 2022-01-29 RX ADMIN — Medication 650 MILLIGRAM(S): at 23:00

## 2022-01-29 RX ADMIN — Medication 2: at 05:40

## 2022-01-29 RX ADMIN — INSULIN HUMAN 5 UNIT(S)/HR: 100 INJECTION, SOLUTION SUBCUTANEOUS at 19:22

## 2022-01-29 RX ADMIN — PANTOPRAZOLE SODIUM 40 MILLIGRAM(S): 20 TABLET, DELAYED RELEASE ORAL at 22:23

## 2022-01-29 RX ADMIN — SODIUM CHLORIDE 100 MILLILITER(S): 5 INJECTION, SOLUTION INTRAVENOUS at 17:13

## 2022-01-29 RX ADMIN — NICARDIPINE HYDROCHLORIDE 25 MG/HR: 30 CAPSULE, EXTENDED RELEASE ORAL at 22:15

## 2022-01-29 RX ADMIN — ATORVASTATIN CALCIUM 20 MILLIGRAM(S): 80 TABLET, FILM COATED ORAL at 22:24

## 2022-01-29 RX ADMIN — POLYETHYLENE GLYCOL 3350 17 GRAM(S): 17 POWDER, FOR SOLUTION ORAL at 17:12

## 2022-01-29 RX ADMIN — SODIUM CHLORIDE 1000 MILLILITER(S): 9 INJECTION INTRAMUSCULAR; INTRAVENOUS; SUBCUTANEOUS at 23:00

## 2022-01-29 RX ADMIN — CHLORHEXIDINE GLUCONATE 15 MILLILITER(S): 213 SOLUTION TOPICAL at 17:12

## 2022-01-29 RX ADMIN — LACOSAMIDE 200 MILLIGRAM(S): 50 TABLET ORAL at 05:31

## 2022-01-29 RX ADMIN — POLYETHYLENE GLYCOL 3350 17 GRAM(S): 17 POWDER, FOR SOLUTION ORAL at 05:31

## 2022-01-29 RX ADMIN — CHLORHEXIDINE GLUCONATE 15 MILLILITER(S): 213 SOLUTION TOPICAL at 05:30

## 2022-01-29 RX ADMIN — SODIUM CHLORIDE 2 GRAM(S): 9 INJECTION INTRAMUSCULAR; INTRAVENOUS; SUBCUTANEOUS at 16:18

## 2022-01-29 RX ADMIN — LISINOPRIL 5 MILLIGRAM(S): 2.5 TABLET ORAL at 05:31

## 2022-01-29 RX ADMIN — Medication 650 MILLIGRAM(S): at 22:24

## 2022-01-29 RX ADMIN — SODIUM CHLORIDE 100 MILLILITER(S): 5 INJECTION, SOLUTION INTRAVENOUS at 19:23

## 2022-01-29 RX ADMIN — Medication 100 MILLIGRAM(S): at 05:31

## 2022-01-29 RX ADMIN — Medication 1 BOTTLE: at 16:18

## 2022-01-29 RX ADMIN — SODIUM CHLORIDE 100 MILLILITER(S): 5 INJECTION, SOLUTION INTRAVENOUS at 03:27

## 2022-01-29 RX ADMIN — LACOSAMIDE 200 MILLIGRAM(S): 50 TABLET ORAL at 17:13

## 2022-01-29 RX ADMIN — Medication 100 GRAM(S): at 03:40

## 2022-01-29 NOTE — PROGRESS NOTE ADULT - SUBJECTIVE AND OBJECTIVE BOX
Patient seen and examined at bedside.    --Anticoagulation--    T(C): 37.3 (01-28-22 @ 23:00), Max: 37.3 (01-28-22 @ 19:00)  HR: 102 (01-29-22 @ 00:00) (71 - 108)  BP: --  RR: 15 (01-29-22 @ 00:00) (14 - 22)  SpO2: 97% (01-29-22 @ 00:00) (97% - 100%)  Wt(kg): --    Exam:    Intubated, EOV, no FC, RUE localizes antigravity however it is weaker compared to LUE. BLE WD.

## 2022-01-29 NOTE — PROGRESS NOTE ADULT - ASSESSMENT
ASSESSMENT/PLAN:    66M presenting with brain mass with significant vasogenic edema with brain compression and midline shift     NEURO:  - neurochecks q1h  - dex 4q6h for vasogenic edema and HTS per below  - Vimpat 200mg BID for sz ppx (transitioned from keppra for behavior)  - c/w vEEG for 48h  - CTH today  - MRI  - pain control    CVS:  - SBP goal 100-160  - lisinopril    PULM:  - ETT to vent  - wean to extubate; likely extubation today pending MRI then will re-assess  - CPAP as tolerated, able to tolerate 10/5 today and not taking good volumes on 5/5      RENAL:  - Fluids: HTS 2%@100  - na goal 145-150  - daily IOs  - BMP q8h    GI:  - Diet: NPO for possible extubation  - GI prophylaxis: PPI while on steroids  - Bowel regimen: miralax, senna    ENDO:   - FS goal 120-180  - insulin drip then bridge to SC insulin      HEME/ONC:  - SCDs  - Chemoppx: hold d/t small post op heme    ID:  - monitor for fevers      Patient is at high risk of neurologic deterioration/death due to:  cerebral edema, brain compression, seizures   ASSESSMENT/PLAN:    66M presenting with brain mass with significant vasogenic edema with brain compression and midline shift     NEURO:  - neurochecks q1h  - dex 4q6h for vasogenic edema and HTS per below  - Vimpat 200mg BID for sz ppx  - c/w vEEG for 48h  - CTH and MRI done, small bleed at the tumor bed  - pain control    CVS:  - SBP goal 100-160  - lisinopril 5  - Labetalol 100 q 8    PULM:  - ETT to vent  - wean to extubate; likely extubation tomorrow  - CPAP as tolerated, able to tolerate 10/5 today and not taking good volumes on 5/5      RENAL:  - Fluids: HTS 2%@100  - na goal 145-150  - daily IOs  - BMP q8h    GI:  - Diet: NPO for possible extubation  - GI prophylaxis: Protonix  - Bowel regimen: miralax, senna    ENDO:   - FS goal 120-180  -Hyperglycemia   - insulin drip at 2U/hr then bridge to SC insulin      HEME/ONC:  - SCDs  - Chemoppx: hold d/t small post op heme    ID:  - monitor for fevers      Patient is at high risk of neurologic deterioration/death due to:  cerebral edema, brain compression, seizures

## 2022-01-29 NOTE — PROGRESS NOTE ADULT - SUBJECTIVE AND OBJECTIVE BOX
NSCU Progress Note    Assessment/Hospital Course:    66M hx HTN/DM was [place] early January, was confused and not answering appropriately, MRI Jan 7 showed L frontal enhancing mass with significant edema. Repeat CTH showed significant vasogenic edema and 1.5cm MLS    24 Hour Events/Subjective:  - POD2 L crani for GBM with gleolan  - tolerating CPAP 5/5 since 4am  - on EEG, no sz overnight      VITALS:   -Reviewed      IMAGING/DATA:   - Reviewed      PHYSICAL EXAM:  General: intubated  CVS: RRR  Pulm: CTAB  GI: Soft, NTND  Extremities: No LE Edema  Neuro: intubated, EO spont, does not fc, pupils 3mm reactive, b/l UE localize L>R, ble wd NSCU Progress Note    Assessment/Hospital Course:    66M hx HTN/DM was [place] for vacation early January, was confused and not answering appropriately, MRI Jan 7 showed L frontal enhancing mass with significant edema. Repeat CTH showed significant vasogenic edema and 1.5cm MLS    24 Hour Events/Subjective:  - POD2 L crani for GBM with gleolan  - tolerating CPAP 10/5 since 4am; was not taking good volumes on 5/5l kept intubated for MRI  - on EEG, no sz overnight      VITALS:   -Reviewed      IMAGING/DATA:   - Reviewed      PHYSICAL EXAM:  General: intubated  CVS: RRR  Pulm: CTAB  GI: Soft, NTND  Extremities: No LE Edema  Neuro: intubated, EO spont, does not fc, pupils 3mm reactive, b/l UE localize L>R, ble wd

## 2022-01-29 NOTE — PROGRESS NOTE ADULT - ASSESSMENT
Osiel Norwood    66M hx HTN/DM was in the [place] for vacation early January, was confused and not answering appropriately, MRI Jan 7 showed L frontal enhancing mass with significant edema.   -s/p L crani for tumor, left intubated for slow wakeup, still not returned to baseline  -Dex  -Keppra  -Repeat CTH is stable  -Dark 48hrs, trend LFTs for gleolan, stable  -Wean to extubate  -MRI pending

## 2022-01-29 NOTE — PROGRESS NOTE ADULT - ASSESSMENT
ASSESSMENT/PLAN:    66M presenting with brain mass with significant vasogenic edema with brain compression and midline shift     NEURO:  - neurochecks q1h  - dex 4q6h for vasogenic edema and HTS per below  - Vimpat 200mg BID for sz ppx (transitioned from keppra for behavior)  - c/w vEEG for 48h  - CTH today  - MRI  - pain control    CVS:  - SBP goal 100-160  - lisinopril    PULM:  - ETT to vent  - wean to extubate; likely extubation today pending imaging to assess cerebral edema  - CPAP as tolerated      RENAL:  - Fluids: HTS 2%@100  - na goal 145-150  - daily IOs  - BMP q8h    GI:  - Diet: NPO for possible extubation  - GI prophylaxis: PPI while on steroids  - Bowel regimen: miralax, senna    ENDO:   - FS goal 120-180  - Ena while on ccs  - will add basal insulin once feeds start    HEME/ONC:  - SCDs  - Chemoppx: hold d/t small post op heme    ID:  - monitor for fevers      Patient is at high risk of neurologic deterioration/death due to:  cerebral edema, brain compression, seizures     ASSESSMENT/PLAN:    66M presenting with brain mass with significant vasogenic edema with brain compression and midline shift     NEURO:  - neurochecks q1h  - dex 4q6h for vasogenic edema and HTS per below  - Vimpat 200mg BID for sz ppx (transitioned from keppra for behavior)  - c/w vEEG for 48h  - CTH today  - MRI  - pain control    CVS:  - SBP goal 100-160  - lisinopril    PULM:  - ETT to vent  - wean to extubate; likely extubation today pending MRI then will re-assess  - CPAP as tolerated, able to tolerate 10/5 today and not taking good volumes on 5/5      RENAL:  - Fluids: HTS 2%@100  - na goal 145-150  - daily IOs  - BMP q8h    GI:  - Diet: NPO for possible extubation  - GI prophylaxis: PPI while on steroids  - Bowel regimen: miralax, senna    ENDO:   - FS goal 120-180  - insulin drip then bridge to SC insulin      HEME/ONC:  - SCDs  - Chemoppx: hold d/t small post op heme    ID:  - monitor for fevers      Patient is at high risk of neurologic deterioration/death due to:  cerebral edema, brain compression, seizures

## 2022-01-29 NOTE — PROGRESS NOTE ADULT - SUBJECTIVE AND OBJECTIVE BOX
HPI:  66M hx HTN/DM was in the [place] for vacation early January, was confused and not answering appropriately, MRI Jan 7 showed L frontal enhancing mass with significant edema. Repeat CTH showed significant vasogenic edema and 1.5cm MLS  - POD2 L crani for GBM with gleolan    EVENTS:   No acute events overnight.    VITALS:  T(C): , Max: 37.3 (01-28-22 @ 23:00)  HR:  (64 - 115)  BP:  (101/70 - 130/80)  ABP:  (100/48 - 181/84)  RR:  (12 - 20)  SpO2:  (96% - 100%)  Wt(kg): --  Device: Avea, Mode: CPAP with PS, FiO2: 40, PEEP: 5, PS: 10, MAP: 8, PIP: 15    01-28-22 @ 07:01  -  01-29-22 @ 07:00  --------------------------------------------------------  IN: 2375 mL / OUT: 3795 mL / NET: -1420 mL    01-29-22 @ 07:01  -  01-29-22 @ 19:47  --------------------------------------------------------  IN: 1210.9 mL / OUT: 950 mL / NET: 260.9 mL      LABS:  Na: 137 (01-29 @ 18:56), 137 (01-29 @ 10:37), 135 (01-29 @ 01:37), 137 (01-28 @ 19:45), 138 (01-28 @ 00:19), 145 (01-27 @ 16:41), 143 (01-27 @ 04:36), 140 (01-26 @ 22:37)  K: 4.0 (01-29 @ 18:56), 4.0 (01-29 @ 10:37), 4.0 (01-29 @ 01:37), 3.9 (01-28 @ 19:45), 4.3 (01-28 @ 00:19), 3.3 (01-27 @ 16:41), 3.6 (01-27 @ 04:36), 3.9 (01-26 @ 22:37)  Cl: 104 (01-29 @ 18:56), 103 (01-29 @ 10:37), 99 (01-29 @ 01:37), 103 (01-28 @ 19:45), 103 (01-28 @ 00:19), 108 (01-27 @ 16:41), 107 (01-27 @ 04:36), 107 (01-26 @ 22:37)  CO2: 20 (01-29 @ 18:56), 22 (01-29 @ 10:37), 22 (01-29 @ 01:37), 22 (01-28 @ 19:45), 19 (01-28 @ 00:19), 17 (01-27 @ 16:41), 22 (01-27 @ 04:36), 19 (01-26 @ 22:37)  BUN: 16 (01-29 @ 18:56), 12 (01-29 @ 10:37), 12 (01-29 @ 01:37), 12 (01-28 @ 19:45), 12 (01-28 @ 00:19), 11 (01-27 @ 16:41), 15 (01-27 @ 04:36), 18 (01-26 @ 22:37)  Cr: 0.54 (01-29 @ 18:56), 0.54 (01-29 @ 10:37), 0.55 (01-29 @ 01:37), 0.62 (01-28 @ 19:45), 0.80 (01-28 @ 00:19), 0.65 (01-27 @ 16:41), 0.82 (01-27 @ 04:36), 0.83 (01-26 @ 22:37)  Glu: 245(01-29 @ 18:56), 219(01-29 @ 10:37), 258(01-29 @ 01:37), 238(01-28 @ 19:45), 285(01-28 @ 00:19), 195(01-27 @ 16:41), 112(01-27 @ 04:36), 284(01-26 @ 22:37)    Hgb: 12.9 (01-28 @ 19:45), 12.8 (01-28 @ 00:18), 13.2 (01-27 @ 16:41), 14.4 (01-26 @ 22:37)  Hct: 38.7 (01-28 @ 19:45), 38.6 (01-28 @ 00:18), 39.2 (01-27 @ 16:41), 43.0 (01-26 @ 22:37)  WBC: 14.33 (01-28 @ 19:45), 14.71 (01-28 @ 00:18), 16.33 (01-27 @ 16:41), 16.26 (01-26 @ 22:37)  Plt: 106 (01-28 @ 19:45), 109 (01-28 @ 00:18), 106 (01-27 @ 16:41), 108 (01-26 @ 22:37)    INR: 1.15 01-27-22 @ 16:41, 1.09 01-26-22 @ 22:37  PTT: 21.0 01-27-22 @ 16:41, 23.5 01-26-22 @ 22:37    IMAGING:   Recent imaging studies were reviewed.    MEDICATIONS:  acetaminophen     Tablet .. 650 milliGRAM(s) Oral every 6 hours PRN  atorvastatin 20 milliGRAM(s) Oral at bedtime  bisacodyl 5 milliGRAM(s) Oral daily PRN  chlorhexidine 0.12% Liquid 15 milliLiter(s) Oral Mucosa every 12 hours  chlorhexidine 4% Liquid 1 Application(s) Topical daily  dexAMETHasone     Tablet 4 milliGRAM(s) Oral every 6 hours  dextrose 50% Injectable 50 milliLiter(s) IV Push every 15 minutes  dextrose 50% Injectable 25 milliLiter(s) IV Push every 15 minutes  dextrose 50% Injectable 12.5 Gram(s) IV Push once  dextrose 50% Injectable 25 Gram(s) IV Push once  glucagon  Injectable 1 milliGRAM(s) IntraMuscular once  insulin regular Infusion 5 Unit(s)/Hr IV Continuous <Continuous>  labetalol 100 milliGRAM(s) Oral every 8 hours  lacosamide 200 milliGRAM(s) Oral two times a day  lisinopril 5 milliGRAM(s) Oral daily  polyethylene glycol 3350 17 Gram(s) Oral every 12 hours  senna 2 Tablet(s) Oral at bedtime  sodium chloride 2 Gram(s) Oral every 6 hours  sodium chloride 2% . 1000 milliLiter(s) IV Continuous <Continuous>    PHYSICAL EXAM:  General: intubated  CVS: RRR  Pulm: CTAB  GI: Soft, NTND  Extremities: No LE Edema  Neuro: intubated, EO spont, does not fc, pupils 3mm reactive, b/l UE localize L>R, ble wd   HPI:  66M hx HTN/DM was [place] early January, was confused and not answering appropriately, MRI Jan 7 showed L frontal enhancing mass with significant edema. Repeat CTH showed significant vasogenic edema and 1.5cm MLS  - POD2 L crani for GBM with gleolan    EVENTS:   vEEG no seizures    VITALS:  T(C): , Max: 37.3 (01-28-22 @ 23:00)  HR:  (64 - 115)  BP:  (101/70 - 130/80)  ABP:  (100/48 - 181/84)  RR:  (12 - 20)  SpO2:  (96% - 100%)  Wt(kg): --  Device: Avea, Mode: CPAP with PS, FiO2: 40, PEEP: 5, PS: 10, MAP: 8, PIP: 15    01-28-22 @ 07:01  -  01-29-22 @ 07:00  --------------------------------------------------------  IN: 2375 mL / OUT: 3795 mL / NET: -1420 mL    01-29-22 @ 07:01  -  01-29-22 @ 19:47  --------------------------------------------------------  IN: 1210.9 mL / OUT: 950 mL / NET: 260.9 mL      LABS:  Na: 137 (01-29 @ 18:56), 137 (01-29 @ 10:37), 135 (01-29 @ 01:37), 137 (01-28 @ 19:45), 138 (01-28 @ 00:19), 145 (01-27 @ 16:41), 143 (01-27 @ 04:36), 140 (01-26 @ 22:37)  K: 4.0 (01-29 @ 18:56), 4.0 (01-29 @ 10:37), 4.0 (01-29 @ 01:37), 3.9 (01-28 @ 19:45), 4.3 (01-28 @ 00:19), 3.3 (01-27 @ 16:41), 3.6 (01-27 @ 04:36), 3.9 (01-26 @ 22:37)  Cl: 104 (01-29 @ 18:56), 103 (01-29 @ 10:37), 99 (01-29 @ 01:37), 103 (01-28 @ 19:45), 103 (01-28 @ 00:19), 108 (01-27 @ 16:41), 107 (01-27 @ 04:36), 107 (01-26 @ 22:37)  CO2: 20 (01-29 @ 18:56), 22 (01-29 @ 10:37), 22 (01-29 @ 01:37), 22 (01-28 @ 19:45), 19 (01-28 @ 00:19), 17 (01-27 @ 16:41), 22 (01-27 @ 04:36), 19 (01-26 @ 22:37)  BUN: 16 (01-29 @ 18:56), 12 (01-29 @ 10:37), 12 (01-29 @ 01:37), 12 (01-28 @ 19:45), 12 (01-28 @ 00:19), 11 (01-27 @ 16:41), 15 (01-27 @ 04:36), 18 (01-26 @ 22:37)  Cr: 0.54 (01-29 @ 18:56), 0.54 (01-29 @ 10:37), 0.55 (01-29 @ 01:37), 0.62 (01-28 @ 19:45), 0.80 (01-28 @ 00:19), 0.65 (01-27 @ 16:41), 0.82 (01-27 @ 04:36), 0.83 (01-26 @ 22:37)  Glu: 245(01-29 @ 18:56), 219(01-29 @ 10:37), 258(01-29 @ 01:37), 238(01-28 @ 19:45), 285(01-28 @ 00:19), 195(01-27 @ 16:41), 112(01-27 @ 04:36), 284(01-26 @ 22:37)    Hgb: 12.9 (01-28 @ 19:45), 12.8 (01-28 @ 00:18), 13.2 (01-27 @ 16:41), 14.4 (01-26 @ 22:37)  Hct: 38.7 (01-28 @ 19:45), 38.6 (01-28 @ 00:18), 39.2 (01-27 @ 16:41), 43.0 (01-26 @ 22:37)  WBC: 14.33 (01-28 @ 19:45), 14.71 (01-28 @ 00:18), 16.33 (01-27 @ 16:41), 16.26 (01-26 @ 22:37)  Plt: 106 (01-28 @ 19:45), 109 (01-28 @ 00:18), 106 (01-27 @ 16:41), 108 (01-26 @ 22:37)    INR: 1.15 01-27-22 @ 16:41, 1.09 01-26-22 @ 22:37  PTT: 21.0 01-27-22 @ 16:41, 23.5 01-26-22 @ 22:37    IMAGING:   Recent imaging studies were reviewed.    MEDICATIONS:  acetaminophen     Tablet .. 650 milliGRAM(s) Oral every 6 hours PRN  atorvastatin 20 milliGRAM(s) Oral at bedtime  bisacodyl 5 milliGRAM(s) Oral daily PRN  chlorhexidine 0.12% Liquid 15 milliLiter(s) Oral Mucosa every 12 hours  chlorhexidine 4% Liquid 1 Application(s) Topical daily  dexAMETHasone     Tablet 4 milliGRAM(s) Oral every 6 hours  dextrose 50% Injectable 50 milliLiter(s) IV Push every 15 minutes  dextrose 50% Injectable 25 milliLiter(s) IV Push every 15 minutes  dextrose 50% Injectable 12.5 Gram(s) IV Push once  dextrose 50% Injectable 25 Gram(s) IV Push once  glucagon  Injectable 1 milliGRAM(s) IntraMuscular once  insulin regular Infusion 5 Unit(s)/Hr IV Continuous <Continuous>  labetalol 100 milliGRAM(s) Oral every 8 hours  lacosamide 200 milliGRAM(s) Oral two times a day  lisinopril 5 milliGRAM(s) Oral daily  polyethylene glycol 3350 17 Gram(s) Oral every 12 hours  senna 2 Tablet(s) Oral at bedtime  sodium chloride 2 Gram(s) Oral every 6 hours  sodium chloride 2% . 1000 milliLiter(s) IV Continuous <Continuous>    PHYSICAL EXAM:  General: intubated  CVS: RRR  Pulm: CTAB  GI: Soft, NTND  Extremities: No LE Edema  Neuro: intubated, EO spont, attends to voice, does not fc, pupils 3mm reactive, b/l UE localize L>R, bilateral LE wd

## 2022-01-29 NOTE — EEG REPORT - NS EEG TEXT BOX
[facility]   COMPREHENSIVE EPILEPSY CENTER   REPORT OF LONG-TERM VIDEO EEG     [facility]: [address], [place] [address], Ph#: [phone]  [facility]: [address] [address], Ph#: [phone]  [facility]: [address] [place] [address], Ph#: [phone]    Patient Name: JORDAN CHKARABORTY  Age and : 66y (57)  MRN #: [id number]  Location: [facility] 03  Referring Physician: Parker Gupta    Start Time/Date: 835 on 22  End Time/Date: 800 on 22   Duration: 23 hours    _____________________________________________________________  STUDY INFORMATION    EEG Recording Technique:  The patient underwent continuous Video-EEG monitoring, using Telemetry System hardware on the XLTek Digital System. EEG and video data were stored on a computer hard drive with important events saved in digital archive files. The material was reviewed by a physician (electroencephalographer / epileptologist) on a daily basis. Nilson and seizure detection algorithms were utilized and reviewed. An EEG Technician attended to the patient, and was available throughout daytime work hours.  The epilepsy center neurologist was available in person or on call 24-hours per day.    EEG Placement and Labeling of Electrodes:  The EEG was performed utilizing 20 channel referential EEG connections (coronal over temporal over parasagittal montage) using all standard 10-20 electrode placements with EKG, with additional electrodes placed in the inferior temporal region using the modified 10-10 montage electrode placements for elective admissions, or if deemed necessary. Recording was at a sampling rate of 256 samples per second per channel. Time synchronized digital video recording was done simultaneously with EEG recording. A low light infrared camera was used for low light recording.     _____________________________________________________________  HISTORY    Patient is a 66M hx HTN/DM [place] early January, was confused and not answering appropriately, MRI  showed L frontal enhancing mass with significant edema. Repeat CTH showed significant vasogenic edema and 1.5cm midline shift. Went to OR on 22 for left craniotomy for resection of tumor. After OR, there was an episode of bilateral LE shaking concerning for seizure, CTH stable, given ativan, loaded with keppra and continued with vimpat    PERTINENT MEDICATION:  lacosamide 200 milliGRAM(s) Oral two times a day    _____________________________________________________________  STUDY INTERPRETATION    Findings: The background was continuous and reactive. The background predominantly consisted of theta, delta and faster activities. No clear wakefulness or posterior dominant rhythm seen.    Background Slowing:  Background predominantly consisted of theta, delta and faster activities.    Focal Slowing:   Near continuous theta/delta slowing in the left parasaggital region, at times rhythmic occurring at 1Hz frequency.    Sleep Background:  Drowsiness was characterized by fragmentation, attenuation, and slowing of the background activity.    Bitemporal wickets seen in drowsiness, right greater than left.  Stage II sleep transients were not well appreciated.    Other Non-Epileptiform Findings:  None    Interictal Epileptiform Activity:   No clear epileptiform discharges.    Events:  Clinical events: None recorded.  Seizures: None recorded.    Activation Procedures:   Hyperventilation was not performed.    Photic stimulation was not performed.     Artifacts:  Intermittent myogenic and movement artifacts were noted.    ECG:  The single channel ECG was not appropriately reflecting heart rate.    _____________________________________________________________  EEG SUMMARY/CLASSIFICATION    Abnormal EEG in an altered patient.  - Near continuous theta/delta slowing in the left parasaggital region, at times rhythmic occurring at 1Hz frequency.  - Moderate-severe generalized slowing  _____________________________________________________________  EEG IMPRESSION/CLINICAL CORRELATE    Abnormal EEG study.  1. Structural or functional abnormality in the left parasaggital region  2. Moderate-severe nonspecific diffuse or multifocal cerebral dysfunction.     No seizure recorded x 2 days.  In absence of additional clinical concerns, recommend consideration for discontinuation of current EEG study with reconnection in future if clinically warranted.    _____________________________________________________________    Armin Grant MD, PANDA  Fellow | [facility]       [facility] EEG Reading Room Ph#: [phone]  Epilepsy Answering Service after 5PM and before 8:30AM: Ph#: [phone].   [facility]   COMPREHENSIVE EPILEPSY CENTER   REPORT OF LONG-TERM VIDEO EEG     [facility]: [address], [place] [address], Ph#: [phone]  [facility]: [address] [place] [place] [address], Ph#: [phone]  [facility]: [address] [place] [address], Ph#: [phone]    Patient Name: JORDAN CHAKRABORTY  Age and : 66y (57)  MRN #: [id number]  Location: [facility] 03  Referring Physician: Parker Gupta    Start Time/Date: 835 on 22  End Time/Date: 800 on 22   Duration: 23 hours    _____________________________________________________________  STUDY INFORMATION    EEG Recording Technique:  The patient underwent continuous Video-EEG monitoring, using Telemetry System hardware on the XLTek Digital System. EEG and video data were stored on a computer hard drive with important events saved in digital archive files. The material was reviewed by a physician (electroencephalographer / epileptologist) on a daily basis. Nilson and seizure detection algorithms were utilized and reviewed. An EEG Technician attended to the patient, and was available throughout daytime work hours.  The epilepsy center neurologist was available in person or on call 24-hours per day.    EEG Placement and Labeling of Electrodes:  The EEG was performed utilizing 20 channel referential EEG connections (coronal over temporal over parasagittal montage) using all standard 10-20 electrode placements with EKG, with additional electrodes placed in the inferior temporal region using the modified 10-10 montage electrode placements for elective admissions, or if deemed necessary. Recording was at a sampling rate of 256 samples per second per channel. Time synchronized digital video recording was done simultaneously with EEG recording. A low light infrared camera was used for low light recording.     _____________________________________________________________  HISTORY    Patient is a 66M hx HTN/DM [place] early January, was confused and not answering appropriately, MRI  showed L frontal enhancing mass with significant edema. Repeat CTH showed significant vasogenic edema and 1.5cm midline shift. Went to OR on 22 for left craniotomy for resection of tumor. After OR, there was an episode of bilateral LE shaking concerning for seizure, CTH stable, given ativan, loaded with keppra and continued with vimpat    PERTINENT MEDICATION:  lacosamide 200 milliGRAM(s) Oral two times a day    _____________________________________________________________  STUDY INTERPRETATION    Findings: The background was continuous and reactive. The background predominantly consisted of theta, delta and faster activities. No clear wakefulness or posterior dominant rhythm seen.    Background Slowing:  Background predominantly consisted of theta, delta and faster activities.    Focal Slowing:   Near continuous theta/delta slowing in the left parasaggital region, at times rhythmic occurring at 1Hz frequency.    Sleep Background:  Drowsiness was characterized by fragmentation, attenuation, and slowing of the background activity.    Bitemporal wickets seen in drowsiness, right greater than left.  Stage II sleep transients were not well appreciated.    Other Non-Epileptiform Findings:  None    Interictal Epileptiform Activity:   No clear epileptiform discharges.    Events:  Clinical events: None recorded.  Seizures: None recorded.    Activation Procedures:   Hyperventilation was not performed.    Photic stimulation was not performed.     Artifacts:  Intermittent myogenic and movement artifacts were noted.    ECG:  The single channel ECG was not appropriately reflecting heart rate.    _____________________________________________________________  EEG SUMMARY/CLASSIFICATION    Abnormal EEG in an altered patient.  - Near continuous theta/delta slowing in the left parasaggital region, at times rhythmic occurring at 1Hz frequency.  - Moderate-severe generalized slowing  _____________________________________________________________  EEG IMPRESSION/CLINICAL CORRELATE    Abnormal EEG study.  1. Structural or functional abnormality in the left parasaggital region  2. Moderate-severe nonspecific diffuse or multifocal cerebral dysfunction.     No seizure recorded x 2 days.  In absence of additional clinical concerns, recommend discontinuation of current EEG study with reconnection in future if clinically warranted.    _____________________________________________________________    Armin Grant MD, PANDA  Fellow | [facility]       [facility] EEG Reading Room Ph#: [phone]  Epilepsy Answering Service after 5PM and before 8:30AM: Ph#: [phone].   [facility]   COMPREHENSIVE EPILEPSY CENTER   REPORT OF LONG-TERM VIDEO EEG     [facility]: [address], [place] [address], Ph#: [phone]  [facility]: [address] [place] [address], Ph#: [phone]  [facility]: [address] [place] [address], Ph#: [phone]    Patient Name: JORDAN CHAKRABORTY  Age and : 66y (57)  MRN #: [id number]  Location: [facility] 03  Referring Physician: Parker Gupta    Start Time/Date: 835 on 22  End Time/Date: 22   Duration: 26 hours 14 mins    _____________________________________________________________  STUDY INFORMATION    EEG Recording Technique:  The patient underwent continuous Video-EEG monitoring, using Telemetry System hardware on the XLTek Digital System. EEG and video data were stored on a computer hard drive with important events saved in digital archive files. The material was reviewed by a physician (electroencephalographer / epileptologist) on a daily basis. Nilson and seizure detection algorithms were utilized and reviewed. An EEG Technician attended to the patient, and was available throughout daytime work hours.  The epilepsy center neurologist was available in person or on call 24-hours per day.    EEG Placement and Labeling of Electrodes:  The EEG was performed utilizing 20 channel referential EEG connections (coronal over temporal over parasagittal montage) using all standard 10-20 electrode placements with EKG, with additional electrodes placed in the inferior temporal region using the modified 10-10 montage electrode placements for elective admissions, or if deemed necessary. Recording was at a sampling rate of 256 samples per second per channel. Time synchronized digital video recording was done simultaneously with EEG recording. A low light infrared camera was used for low light recording.     _____________________________________________________________  HISTORY    Patient is a 66M hx HTN/DM [place] early January, was confused and not answering appropriately, MRI  showed L frontal enhancing mass with significant edema. Repeat CTH showed significant vasogenic edema and 1.5cm midline shift. Went to OR on 22 for left craniotomy for resection of tumor. After OR, there was an episode of bilateral LE shaking concerning for seizure, CTH stable, given ativan, loaded with keppra and continued with vimpat    PERTINENT MEDICATION:  lacosamide 200 milliGRAM(s) Oral two times a day    _____________________________________________________________  STUDY INTERPRETATION    Findings: The background was continuous and reactive. The background predominantly consisted of theta, delta and faster activities. No clear wakefulness or posterior dominant rhythm seen.    Background Slowing:  Background predominantly consisted of theta, delta and faster activities.    Focal Slowing:   Near continuous theta/delta slowing in the left parasaggital region, at times rhythmic occurring at 1Hz frequency.    Sleep Background:  Drowsiness was characterized by fragmentation, attenuation, and slowing of the background activity.    Bitemporal wickets seen in drowsiness, right greater than left.  Stage II sleep transients were not well appreciated.    Other Non-Epileptiform Findings:  None    Interictal Epileptiform Activity:   No clear epileptiform discharges.    Events:  Clinical events: None recorded.  Seizures: None recorded.    Activation Procedures:   Hyperventilation was not performed.    Photic stimulation was not performed.     Artifacts:  Intermittent myogenic and movement artifacts were noted.    ECG:  The single channel ECG was not appropriately reflecting heart rate.    _____________________________________________________________  EEG SUMMARY/CLASSIFICATION    Abnormal EEG in an altered patient.  - Near continuous theta/delta slowing in the left parasaggital region, at times rhythmic occurring at 1Hz frequency.  - Moderate-severe generalized slowing  _____________________________________________________________  EEG IMPRESSION/CLINICAL CORRELATE    Abnormal EEG study.  1. Structural or functional abnormality in the left parasaggital region  2. Moderate-severe nonspecific diffuse or multifocal cerebral dysfunction.     No seizure recorded x 2 days.  In absence of additional clinical concerns, recommend discontinuation of current EEG study with reconnection in future if clinically warranted.    _____________________________________________________________    Armin Grant MD, PANDA  Fellow | [facility]       [facility] EEG Reading Room Ph#: [phone]  Epilepsy Answering Service after 5PM and before 8:30AM: Ph#: [phone].

## 2022-01-30 LAB
ANION GAP SERPL CALC-SCNC: 10 MMOL/L — SIGNIFICANT CHANGE UP (ref 5–17)
ANION GAP SERPL CALC-SCNC: 8 MMOL/L — SIGNIFICANT CHANGE UP (ref 5–17)
ANION GAP SERPL CALC-SCNC: 9 MMOL/L — SIGNIFICANT CHANGE UP (ref 5–17)
BUN SERPL-MCNC: 14 MG/DL — SIGNIFICANT CHANGE UP (ref 7–23)
BUN SERPL-MCNC: 14 MG/DL — SIGNIFICANT CHANGE UP (ref 7–23)
BUN SERPL-MCNC: 15 MG/DL — SIGNIFICANT CHANGE UP (ref 7–23)
CALCIUM SERPL-MCNC: 7.8 MG/DL — LOW (ref 8.4–10.5)
CALCIUM SERPL-MCNC: 7.9 MG/DL — LOW (ref 8.4–10.5)
CALCIUM SERPL-MCNC: 8 MG/DL — LOW (ref 8.4–10.5)
CHLORIDE SERPL-SCNC: 106 MMOL/L — SIGNIFICANT CHANGE UP (ref 96–108)
CHLORIDE SERPL-SCNC: 109 MMOL/L — HIGH (ref 96–108)
CHLORIDE SERPL-SCNC: 109 MMOL/L — HIGH (ref 96–108)
CO2 SERPL-SCNC: 22 MMOL/L — SIGNIFICANT CHANGE UP (ref 22–31)
CO2 SERPL-SCNC: 23 MMOL/L — SIGNIFICANT CHANGE UP (ref 22–31)
CO2 SERPL-SCNC: 24 MMOL/L — SIGNIFICANT CHANGE UP (ref 22–31)
CREAT SERPL-MCNC: 0.52 MG/DL — SIGNIFICANT CHANGE UP (ref 0.5–1.3)
CREAT SERPL-MCNC: 0.52 MG/DL — SIGNIFICANT CHANGE UP (ref 0.5–1.3)
CREAT SERPL-MCNC: 0.55 MG/DL — SIGNIFICANT CHANGE UP (ref 0.5–1.3)
GAS PNL BLDA: SIGNIFICANT CHANGE UP
GLUCOSE BLDC GLUCOMTR-MCNC: 100 MG/DL — HIGH (ref 70–99)
GLUCOSE BLDC GLUCOMTR-MCNC: 102 MG/DL — HIGH (ref 70–99)
GLUCOSE BLDC GLUCOMTR-MCNC: 112 MG/DL — HIGH (ref 70–99)
GLUCOSE BLDC GLUCOMTR-MCNC: 112 MG/DL — HIGH (ref 70–99)
GLUCOSE BLDC GLUCOMTR-MCNC: 113 MG/DL — HIGH (ref 70–99)
GLUCOSE BLDC GLUCOMTR-MCNC: 113 MG/DL — HIGH (ref 70–99)
GLUCOSE BLDC GLUCOMTR-MCNC: 114 MG/DL — HIGH (ref 70–99)
GLUCOSE BLDC GLUCOMTR-MCNC: 114 MG/DL — HIGH (ref 70–99)
GLUCOSE BLDC GLUCOMTR-MCNC: 118 MG/DL — HIGH (ref 70–99)
GLUCOSE BLDC GLUCOMTR-MCNC: 121 MG/DL — HIGH (ref 70–99)
GLUCOSE BLDC GLUCOMTR-MCNC: 123 MG/DL — HIGH (ref 70–99)
GLUCOSE BLDC GLUCOMTR-MCNC: 126 MG/DL — HIGH (ref 70–99)
GLUCOSE BLDC GLUCOMTR-MCNC: 126 MG/DL — HIGH (ref 70–99)
GLUCOSE BLDC GLUCOMTR-MCNC: 129 MG/DL — HIGH (ref 70–99)
GLUCOSE BLDC GLUCOMTR-MCNC: 133 MG/DL — HIGH (ref 70–99)
GLUCOSE BLDC GLUCOMTR-MCNC: 136 MG/DL — HIGH (ref 70–99)
GLUCOSE BLDC GLUCOMTR-MCNC: 137 MG/DL — HIGH (ref 70–99)
GLUCOSE BLDC GLUCOMTR-MCNC: 142 MG/DL — HIGH (ref 70–99)
GLUCOSE BLDC GLUCOMTR-MCNC: 143 MG/DL — HIGH (ref 70–99)
GLUCOSE BLDC GLUCOMTR-MCNC: 145 MG/DL — HIGH (ref 70–99)
GLUCOSE BLDC GLUCOMTR-MCNC: 160 MG/DL — HIGH (ref 70–99)
GLUCOSE BLDC GLUCOMTR-MCNC: 163 MG/DL — HIGH (ref 70–99)
GLUCOSE BLDC GLUCOMTR-MCNC: 165 MG/DL — HIGH (ref 70–99)
GLUCOSE BLDC GLUCOMTR-MCNC: 166 MG/DL — HIGH (ref 70–99)
GLUCOSE BLDC GLUCOMTR-MCNC: 173 MG/DL — HIGH (ref 70–99)
GLUCOSE BLDC GLUCOMTR-MCNC: 177 MG/DL — HIGH (ref 70–99)
GLUCOSE SERPL-MCNC: 121 MG/DL — HIGH (ref 70–99)
GLUCOSE SERPL-MCNC: 134 MG/DL — HIGH (ref 70–99)
GLUCOSE SERPL-MCNC: 174 MG/DL — HIGH (ref 70–99)
HCT VFR BLD CALC: 32.3 % — LOW (ref 39–50)
HGB BLD-MCNC: 10.6 G/DL — LOW (ref 13–17)
MAGNESIUM SERPL-MCNC: 2 MG/DL — SIGNIFICANT CHANGE UP (ref 1.6–2.6)
MAGNESIUM SERPL-MCNC: 2.2 MG/DL — SIGNIFICANT CHANGE UP (ref 1.6–2.6)
MAGNESIUM SERPL-MCNC: 2.4 MG/DL — SIGNIFICANT CHANGE UP (ref 1.6–2.6)
MCHC RBC-ENTMCNC: 31.1 PG — SIGNIFICANT CHANGE UP (ref 27–34)
MCHC RBC-ENTMCNC: 32.8 GM/DL — SIGNIFICANT CHANGE UP (ref 32–36)
MCV RBC AUTO: 94.7 FL — SIGNIFICANT CHANGE UP (ref 80–100)
NRBC # BLD: 0 /100 WBCS — SIGNIFICANT CHANGE UP (ref 0–0)
PHOSPHATE SERPL-MCNC: 2.2 MG/DL — LOW (ref 2.5–4.5)
PHOSPHATE SERPL-MCNC: 2.7 MG/DL — SIGNIFICANT CHANGE UP (ref 2.5–4.5)
PHOSPHATE SERPL-MCNC: 3.4 MG/DL — SIGNIFICANT CHANGE UP (ref 2.5–4.5)
PLATELET # BLD AUTO: 98 K/UL — LOW (ref 150–400)
POTASSIUM SERPL-MCNC: 3.8 MMOL/L — SIGNIFICANT CHANGE UP (ref 3.5–5.3)
POTASSIUM SERPL-MCNC: 3.9 MMOL/L — SIGNIFICANT CHANGE UP (ref 3.5–5.3)
POTASSIUM SERPL-MCNC: 4.2 MMOL/L — SIGNIFICANT CHANGE UP (ref 3.5–5.3)
POTASSIUM SERPL-SCNC: 3.8 MMOL/L — SIGNIFICANT CHANGE UP (ref 3.5–5.3)
POTASSIUM SERPL-SCNC: 3.9 MMOL/L — SIGNIFICANT CHANGE UP (ref 3.5–5.3)
POTASSIUM SERPL-SCNC: 4.2 MMOL/L — SIGNIFICANT CHANGE UP (ref 3.5–5.3)
RBC # BLD: 3.41 M/UL — LOW (ref 4.2–5.8)
RBC # FLD: 11.9 % — SIGNIFICANT CHANGE UP (ref 10.3–14.5)
SODIUM SERPL-SCNC: 139 MMOL/L — SIGNIFICANT CHANGE UP (ref 135–145)
SODIUM SERPL-SCNC: 140 MMOL/L — SIGNIFICANT CHANGE UP (ref 135–145)
SODIUM SERPL-SCNC: 141 MMOL/L — SIGNIFICANT CHANGE UP (ref 135–145)
WBC # BLD: 12.07 K/UL — HIGH (ref 3.8–10.5)
WBC # FLD AUTO: 12.07 K/UL — HIGH (ref 3.8–10.5)

## 2022-01-30 PROCEDURE — 71045 X-RAY EXAM CHEST 1 VIEW: CPT | Mod: 26,59

## 2022-01-30 PROCEDURE — 99291 CRITICAL CARE FIRST HOUR: CPT

## 2022-01-30 PROCEDURE — 70450 CT HEAD/BRAIN W/O DYE: CPT | Mod: 26

## 2022-01-30 RX ORDER — PANTOPRAZOLE SODIUM 20 MG/1
40 TABLET, DELAYED RELEASE ORAL AT BEDTIME
Refills: 0 | Status: DISCONTINUED | OUTPATIENT
Start: 2022-01-30 | End: 2022-02-08

## 2022-01-30 RX ORDER — SODIUM CHLORIDE 9 MG/ML
3 INJECTION INTRAMUSCULAR; INTRAVENOUS; SUBCUTANEOUS EVERY 6 HOURS
Refills: 0 | Status: DISCONTINUED | OUTPATIENT
Start: 2022-01-30 | End: 2022-02-03

## 2022-01-30 RX ORDER — LISINOPRIL 2.5 MG/1
10 TABLET ORAL DAILY
Refills: 0 | Status: DISCONTINUED | OUTPATIENT
Start: 2022-01-30 | End: 2022-02-03

## 2022-01-30 RX ORDER — FENTANYL CITRATE 50 UG/ML
25 INJECTION INTRAVENOUS ONCE
Refills: 0 | Status: DISCONTINUED | OUTPATIENT
Start: 2022-01-30 | End: 2022-01-30

## 2022-01-30 RX ORDER — POTASSIUM PHOSPHATE, MONOBASIC POTASSIUM PHOSPHATE, DIBASIC 236; 224 MG/ML; MG/ML
15 INJECTION, SOLUTION INTRAVENOUS ONCE
Refills: 0 | Status: COMPLETED | OUTPATIENT
Start: 2022-01-30 | End: 2022-01-30

## 2022-01-30 RX ORDER — ENOXAPARIN SODIUM 100 MG/ML
40 INJECTION SUBCUTANEOUS
Refills: 0 | Status: DISCONTINUED | OUTPATIENT
Start: 2022-01-30 | End: 2022-02-08

## 2022-01-30 RX ORDER — LABETALOL HCL 100 MG
100 TABLET ORAL EVERY 8 HOURS
Refills: 0 | Status: DISCONTINUED | OUTPATIENT
Start: 2022-01-30 | End: 2022-01-31

## 2022-01-30 RX ADMIN — Medication 4 MILLIGRAM(S): at 11:52

## 2022-01-30 RX ADMIN — Medication 100 MILLIGRAM(S): at 14:50

## 2022-01-30 RX ADMIN — PANTOPRAZOLE SODIUM 40 MILLIGRAM(S): 20 TABLET, DELAYED RELEASE ORAL at 22:57

## 2022-01-30 RX ADMIN — SODIUM CHLORIDE 2 GRAM(S): 9 INJECTION INTRAMUSCULAR; INTRAVENOUS; SUBCUTANEOUS at 11:52

## 2022-01-30 RX ADMIN — ENOXAPARIN SODIUM 40 MILLIGRAM(S): 100 INJECTION SUBCUTANEOUS at 17:08

## 2022-01-30 RX ADMIN — FENTANYL CITRATE 25 MICROGRAM(S): 50 INJECTION INTRAVENOUS at 02:00

## 2022-01-30 RX ADMIN — LACOSAMIDE 140 MILLIGRAM(S): 50 TABLET ORAL at 17:07

## 2022-01-30 RX ADMIN — LACOSAMIDE 140 MILLIGRAM(S): 50 TABLET ORAL at 05:31

## 2022-01-30 RX ADMIN — SODIUM CHLORIDE 100 MILLILITER(S): 5 INJECTION, SOLUTION INTRAVENOUS at 07:27

## 2022-01-30 RX ADMIN — ATORVASTATIN CALCIUM 20 MILLIGRAM(S): 80 TABLET, FILM COATED ORAL at 22:57

## 2022-01-30 RX ADMIN — SODIUM CHLORIDE 2 GRAM(S): 9 INJECTION INTRAMUSCULAR; INTRAVENOUS; SUBCUTANEOUS at 05:34

## 2022-01-30 RX ADMIN — SODIUM CHLORIDE 2 GRAM(S): 9 INJECTION INTRAMUSCULAR; INTRAVENOUS; SUBCUTANEOUS at 00:28

## 2022-01-30 RX ADMIN — FENTANYL CITRATE 25 MICROGRAM(S): 50 INJECTION INTRAVENOUS at 01:00

## 2022-01-30 RX ADMIN — CHLORHEXIDINE GLUCONATE 1 APPLICATION(S): 213 SOLUTION TOPICAL at 11:52

## 2022-01-30 RX ADMIN — Medication 4 MILLIGRAM(S): at 00:28

## 2022-01-30 RX ADMIN — INSULIN HUMAN 5 UNIT(S)/HR: 100 INJECTION, SOLUTION SUBCUTANEOUS at 19:58

## 2022-01-30 RX ADMIN — Medication 85 MILLIMOLE(S): at 05:31

## 2022-01-30 RX ADMIN — LISINOPRIL 5 MILLIGRAM(S): 2.5 TABLET ORAL at 05:32

## 2022-01-30 RX ADMIN — INSULIN HUMAN 5 UNIT(S)/HR: 100 INJECTION, SOLUTION SUBCUTANEOUS at 07:27

## 2022-01-30 RX ADMIN — Medication 100 MILLIGRAM(S): at 05:32

## 2022-01-30 RX ADMIN — Medication 4 MILLIGRAM(S): at 05:33

## 2022-01-30 RX ADMIN — Medication 100 MILLIGRAM(S): at 22:57

## 2022-01-30 RX ADMIN — Medication 4 MILLIGRAM(S): at 17:06

## 2022-01-30 RX ADMIN — CHLORHEXIDINE GLUCONATE 15 MILLILITER(S): 213 SOLUTION TOPICAL at 05:34

## 2022-01-30 RX ADMIN — POTASSIUM PHOSPHATE, MONOBASIC POTASSIUM PHOSPHATE, DIBASIC 62.5 MILLIMOLE(S): 236; 224 INJECTION, SOLUTION INTRAVENOUS at 22:57

## 2022-01-30 RX ADMIN — SODIUM CHLORIDE 2 GRAM(S): 9 INJECTION INTRAMUSCULAR; INTRAVENOUS; SUBCUTANEOUS at 17:09

## 2022-01-30 NOTE — PROGRESS NOTE ADULT - ASSESSMENT
ASSESSMENT/PLAN:    66M presenting with brain mass with significant vasogenic edema with brain compression and midline shift     NEURO:  - neurochecks q1h  - dex 4q6h for vasogenic edema and HTS per below  - Vimpat 200mg BID for sz ppx  - c/w vEEG for 48h  - CTH and MRI done, small bleed at the tumor bed  - pain control    CVS:  - SBP goal 100-160  - lisinopril 5  - Labetalol 100 q 8    PULM:  - ETT to vent  - wean to extubate; likely extubation tomorrow  - CPAP as tolerated, able to tolerate 10/5 today and not taking good volumes on 5/5      RENAL:  - Fluids: HTS 2%@100  - na goal 145-150  - daily IOs  - BMP q8h    GI:  - Diet: NPO for possible extubation  - GI prophylaxis: Protonix  - Bowel regimen: miralax, senna    ENDO:   - FS goal 120-180  -Hyperglycemia   - insulin drip at 2U/hr then bridge to SC insulin      HEME/ONC:  - SCDs  - Chemoppx: hold d/t small post op heme    ID:  - monitor for fevers      Patient is at high risk of neurologic deterioration/death due to:  cerebral edema, brain compression, seizures   ASSESSMENT/PLAN:    66M presenting with brain mass with significant vasogenic edema with brain compression and midline shift     NEURO:  - neurochecks q1h  - dex 4q6h for vasogenic edema and HTS per below  - Vimpat 200mg BID for sz ppx  - CTH and MRI done, small bleed at the tumor bed  -CT head today stable  - pain control    CVS:  - SBP goal 100-160  - lisinopril 5  - Labetalol 100 q 8    PULM:  - ETT to vent  - ABG, CXR, extubation today   - CPAP as tolerated, able to tolerate 10/5 today and not taking good volumes on 5/5      RENAL:  - Fluids: HTS 2%@100  - na goal 145-150  - daily IOs  - BMP q8h    GI:  - Diet: NPO for possible extubation  - GI prophylaxis: Protonix  - Bowel regimen: miralax, senna    ENDO:   - FS goal 120-180  -Hyperglycemia   - insulin drip at 2U/hr then bridge to SC insulin      HEME/ONC:  - SCDs  - Chemoppx: Start Lovenox at 40    ID:  - monitor for fevers      Patient is at high risk of neurologic deterioration/death due to:  cerebral edema, brain compression, seizures

## 2022-01-30 NOTE — PHYSICAL THERAPY INITIAL EVALUATION ADULT - DIAGNOSIS, PT EVAL
decreased functional mobility due to weakness
decreased functional mobility due to weakness and inattention to R side

## 2022-01-30 NOTE — PHYSICAL THERAPY INITIAL EVALUATION ADULT - REFERRAL TO ANOTHER SERVICE NEEDED, PT EVAL
Diabetes mellitus, type 2    Diverticulitis    DVT (deep venous thrombosis)    Hyperlipidemia    Hypertension    SBO (small bowel obstruction)
occupational therapy
occupational therapy

## 2022-01-30 NOTE — PROGRESS NOTE ADULT - ASSESSMENT
66M presenting with brain mass with significant vasogenic edema with brain compression and midline shift     - Wean to extubate  - Continue Vimpat  - MRI showing good resection, less mass effect.  - Dex 4q6  - CTH in AM

## 2022-01-30 NOTE — AIRWAY REMOVAL NOTE  ADULT & PEDS - ARTIFICAL AIRWAY REMOVAL COMMENTS
Written order for extubation verified. The patient was identified by full name and birth date compared to the identification band. Present during the procedure was RN Lew

## 2022-01-30 NOTE — PHYSICAL THERAPY INITIAL EVALUATION ADULT - PERTINENT HX OF CURRENT PROBLEM, REHAB EVAL
PMHx: HTN, DM. [place] early January, was confused and not answering appropriately, MRI 1/7: L frontal enhancing mass with significant edema. Repeat CTH 1/24: Extensive lesion & surrounding vasogenic edema in L frontal & temporal lobes with involvement of the corpus callosum & medial L frontal lobe with marked ventricular effacement & 1.4 cm L to R midline shift. Exam: Awake, Ox1, answering many questions inappropriately/perseverating, R drift
s/p L craniotomy for resection of brain tumor with Gleolan 1/27 remained intubated due to slow wakeup. after OR b/l LE shaking concerning for seizure, CTH stable. vEEG neg. s/p extub 1/30 at 11:10

## 2022-01-30 NOTE — PHYSICAL THERAPY INITIAL EVALUATION ADULT - PHYSICAL ASSIST/NONPHYSICAL ASSIST: SIT/STAND, REHAB EVAL
verbal cues/nonverbal cues (demo/gestures)/1 person assist
inattention RUE with assist to place R hand on walker/verbal cues/nonverbal cues (demo/gestures)/2 person assist

## 2022-01-30 NOTE — PROGRESS NOTE ADULT - SUBJECTIVE AND OBJECTIVE BOX
HPI:  66M hx HTN/DM was in the [place] vacation early January, was confused and not answering appropriately, MRI Jan 7 showed L frontal enhancing mass with significant edema. Repeat CTH showed significant vasogenic edema and 1.5cm MLS  - POD3 L crani for GBM with gleolan    OVERNIGHT EVENTS:   No acute events overnight.    VITALS:  T(C): , Max: 36.9 (01-29-22 @ 15:00)  HR:  (56 - 96)  BP:  (101/70 - 130/80)  ABP:  (100/48 - 170/75)  RR:  (12 - 20)  SpO2:  (98% - 100%)  Wt(kg): --  Device: Avea, Mode: CPAP with PS, FiO2: 40, PEEP: 5, PS: 10, MAP: 9, PIP: 15    01-28-22 @ 07:01  -  01-29-22 @ 07:00  --------------------------------------------------------  IN: 2375 mL / OUT: 3795 mL / NET: -1420 mL    01-29-22 @ 07:01  -  01-30-22 @ 05:24  --------------------------------------------------------  IN: 3581.4 mL / OUT: 1500 mL / NET: 2081.4 mL      LABS:  Na: 141 (01-30 @ 02:50), 137 (01-29 @ 18:56), 137 (01-29 @ 10:37), 135 (01-29 @ 01:37), 137 (01-28 @ 19:45), 138 (01-28 @ 00:19), 145 (01-27 @ 16:41)  K: 4.2 (01-30 @ 02:50), 4.0 (01-29 @ 18:56), 4.0 (01-29 @ 10:37), 4.0 (01-29 @ 01:37), 3.9 (01-28 @ 19:45), 4.3 (01-28 @ 00:19), 3.3 (01-27 @ 16:41)  Cl: 109 (01-30 @ 02:50), 104 (01-29 @ 18:56), 103 (01-29 @ 10:37), 99 (01-29 @ 01:37), 103 (01-28 @ 19:45), 103 (01-28 @ 00:19), 108 (01-27 @ 16:41)  CO2: 22 (01-30 @ 02:50), 20 (01-29 @ 18:56), 22 (01-29 @ 10:37), 22 (01-29 @ 01:37), 22 (01-28 @ 19:45), 19 (01-28 @ 00:19), 17 (01-27 @ 16:41)  BUN: 15 (01-30 @ 02:50), 16 (01-29 @ 18:56), 12 (01-29 @ 10:37), 12 (01-29 @ 01:37), 12 (01-28 @ 19:45), 12 (01-28 @ 00:19), 11 (01-27 @ 16:41)  Cr: 0.52 (01-30 @ 02:50), 0.54 (01-29 @ 18:56), 0.54 (01-29 @ 10:37), 0.55 (01-29 @ 01:37), 0.62 (01-28 @ 19:45), 0.80 (01-28 @ 00:19), 0.65 (01-27 @ 16:41)  Glu: 134(01-30 @ 02:50), 245(01-29 @ 18:56), 219(01-29 @ 10:37), 258(01-29 @ 01:37), 238(01-28 @ 19:45), 285(01-28 @ 00:19), 195(01-27 @ 16:41)    Hgb: 12.9 (01-28 @ 19:45), 12.8 (01-28 @ 00:18), 13.2 (01-27 @ 16:41)  Hct: 38.7 (01-28 @ 19:45), 38.6 (01-28 @ 00:18), 39.2 (01-27 @ 16:41)  WBC: 14.33 (01-28 @ 19:45), 14.71 (01-28 @ 00:18), 16.33 (01-27 @ 16:41)  Plt: 106 (01-28 @ 19:45), 109 (01-28 @ 00:18), 106 (01-27 @ 16:41)    INR: 1.15 01-27-22 @ 16:41  PTT: 21.0 01-27-22 @ 16:41    IMAGING:   Recent imaging studies were reviewed.    MEDICATIONS:  acetaminophen     Tablet .. 650 milliGRAM(s) Oral every 6 hours PRN  atorvastatin 20 milliGRAM(s) Oral at bedtime  bisacodyl 5 milliGRAM(s) Oral daily PRN  chlorhexidine 0.12% Liquid 15 milliLiter(s) Oral Mucosa every 12 hours  chlorhexidine 4% Liquid 1 Application(s) Topical daily  dexAMETHasone  Injectable 4 milliGRAM(s) IV Push every 6 hours  dextrose 50% Injectable 12.5 Gram(s) IV Push once  dextrose 50% Injectable 25 Gram(s) IV Push once  dextrose 50% Injectable 50 milliLiter(s) IV Push every 15 minutes  dextrose 50% Injectable 25 milliLiter(s) IV Push every 15 minutes  fentaNYL    Injectable 25 MICROGram(s) IV Push once  glucagon  Injectable 1 milliGRAM(s) IntraMuscular once  insulin regular Infusion 5 Unit(s)/Hr IV Continuous <Continuous>  labetalol 100 milliGRAM(s) Oral every 8 hours  lacosamide IVPB 200 milliGRAM(s) IV Intermittent every 12 hours  lisinopril 5 milliGRAM(s) Oral daily  polyethylene glycol 3350 17 Gram(s) Oral every 12 hours  senna 2 Tablet(s) Oral at bedtime  sodium chloride 2 Gram(s) Oral every 6 hours  sodium chloride 2% . 1000 milliLiter(s) IV Continuous <Continuous>  sodium phosphate IVPB 30 milliMole(s) IV Intermittent once    PHYSICAL EXAM:  General: intubated  CVS: RRR  Pulm: CTAB  GI: Soft, NTND  Extremities: No LE Edema  Neuro: intubated, EO spont, attends to voice, does not fc, pupils 3mm reactive, b/l UE localize L>R, bilateral LE wd   HPI:  66M hx HTN/DM was [place] early January, was confused and not answering appropriately, MRI Jan 7 showed L frontal enhancing mass with significant edema. Repeat CTH showed significant vasogenic edema and 1.5cm MLS  - POD3 L crani for GBM with gleolan    OVERNIGHT EVENTS:   No acute events overnight.    VITALS:  T(C): , Max: 36.9 (01-29-22 @ 15:00)  HR:  (56 - 96)  BP:  (101/70 - 130/80)  ABP:  (100/48 - 170/75)  RR:  (12 - 20)  SpO2:  (98% - 100%)  Wt(kg): --  Device: Avea, Mode: CPAP with PS, FiO2: 40, PEEP: 5, PS: 10, MAP: 9, PIP: 15    01-28-22 @ 07:01  -  01-29-22 @ 07:00  --------------------------------------------------------  IN: 2375 mL / OUT: 3795 mL / NET: -1420 mL    01-29-22 @ 07:01  -  01-30-22 @ 05:24  --------------------------------------------------------  IN: 3581.4 mL / OUT: 1500 mL / NET: 2081.4 mL      LABS:  Na: 141 (01-30 @ 02:50), 137 (01-29 @ 18:56), 137 (01-29 @ 10:37), 135 (01-29 @ 01:37), 137 (01-28 @ 19:45), 138 (01-28 @ 00:19), 145 (01-27 @ 16:41)  K: 4.2 (01-30 @ 02:50), 4.0 (01-29 @ 18:56), 4.0 (01-29 @ 10:37), 4.0 (01-29 @ 01:37), 3.9 (01-28 @ 19:45), 4.3 (01-28 @ 00:19), 3.3 (01-27 @ 16:41)  Cl: 109 (01-30 @ 02:50), 104 (01-29 @ 18:56), 103 (01-29 @ 10:37), 99 (01-29 @ 01:37), 103 (01-28 @ 19:45), 103 (01-28 @ 00:19), 108 (01-27 @ 16:41)  CO2: 22 (01-30 @ 02:50), 20 (01-29 @ 18:56), 22 (01-29 @ 10:37), 22 (01-29 @ 01:37), 22 (01-28 @ 19:45), 19 (01-28 @ 00:19), 17 (01-27 @ 16:41)  BUN: 15 (01-30 @ 02:50), 16 (01-29 @ 18:56), 12 (01-29 @ 10:37), 12 (01-29 @ 01:37), 12 (01-28 @ 19:45), 12 (01-28 @ 00:19), 11 (01-27 @ 16:41)  Cr: 0.52 (01-30 @ 02:50), 0.54 (01-29 @ 18:56), 0.54 (01-29 @ 10:37), 0.55 (01-29 @ 01:37), 0.62 (01-28 @ 19:45), 0.80 (01-28 @ 00:19), 0.65 (01-27 @ 16:41)  Glu: 134(01-30 @ 02:50), 245(01-29 @ 18:56), 219(01-29 @ 10:37), 258(01-29 @ 01:37), 238(01-28 @ 19:45), 285(01-28 @ 00:19), 195(01-27 @ 16:41)    Hgb: 12.9 (01-28 @ 19:45), 12.8 (01-28 @ 00:18), 13.2 (01-27 @ 16:41)  Hct: 38.7 (01-28 @ 19:45), 38.6 (01-28 @ 00:18), 39.2 (01-27 @ 16:41)  WBC: 14.33 (01-28 @ 19:45), 14.71 (01-28 @ 00:18), 16.33 (01-27 @ 16:41)  Plt: 106 (01-28 @ 19:45), 109 (01-28 @ 00:18), 106 (01-27 @ 16:41)    INR: 1.15 01-27-22 @ 16:41  PTT: 21.0 01-27-22 @ 16:41    IMAGING:   Recent imaging studies were reviewed.    MEDICATIONS:  acetaminophen     Tablet .. 650 milliGRAM(s) Oral every 6 hours PRN  atorvastatin 20 milliGRAM(s) Oral at bedtime  bisacodyl 5 milliGRAM(s) Oral daily PRN  chlorhexidine 0.12% Liquid 15 milliLiter(s) Oral Mucosa every 12 hours  chlorhexidine 4% Liquid 1 Application(s) Topical daily  dexAMETHasone  Injectable 4 milliGRAM(s) IV Push every 6 hours  dextrose 50% Injectable 12.5 Gram(s) IV Push once  dextrose 50% Injectable 25 Gram(s) IV Push once  dextrose 50% Injectable 50 milliLiter(s) IV Push every 15 minutes  dextrose 50% Injectable 25 milliLiter(s) IV Push every 15 minutes  fentaNYL    Injectable 25 MICROGram(s) IV Push once  glucagon  Injectable 1 milliGRAM(s) IntraMuscular once  insulin regular Infusion 5 Unit(s)/Hr IV Continuous <Continuous>  labetalol 100 milliGRAM(s) Oral every 8 hours  lacosamide IVPB 200 milliGRAM(s) IV Intermittent every 12 hours  lisinopril 5 milliGRAM(s) Oral daily  polyethylene glycol 3350 17 Gram(s) Oral every 12 hours  senna 2 Tablet(s) Oral at bedtime  sodium chloride 2 Gram(s) Oral every 6 hours  sodium chloride 2% . 1000 milliLiter(s) IV Continuous <Continuous>  sodium phosphate IVPB 30 milliMole(s) IV Intermittent once    PHYSICAL EXAM:  General: intubated  CVS: RRR  Pulm: CTAB  GI: Soft, NTND  Extremities: No LE Edema  Neuro: GCS [id number] intubated, EO spont, attends to voice, Follows simple commands, pupils 3mm reactive, b/l UE localize L>R, bilateral LE wd

## 2022-01-30 NOTE — PHYSICAL THERAPY INITIAL EVALUATION ADULT - GENERAL OBSERVATIONS, REHAB EVAL
received supine with head of bed elevated +ngt, +bassam, +iv, +condom cath, +bilateral wrist restraints untied, +bilateral sequential compression devices, +o2 via nc
received sidelying R in NAD, +iv

## 2022-01-30 NOTE — PHYSICAL THERAPY INITIAL EVALUATION ADULT - ADDITIONAL COMMENTS
per pt, lives alone in apartment, +steps to enter, right hand dominant    +inattention R side
per pt, lives alone in apartment, +steps to enter, right hand dominant      following commands better in [place],  service utilized

## 2022-01-30 NOTE — PROGRESS NOTE ADULT - SUBJECTIVE AND OBJECTIVE BOX
EVENTS:   Patient seen on evening rounds, no acute events.  Extubated during the day today.     VITALS:  T(C): , Max: 37.3 (01-30-22 @ 11:00)  HR:  (56 - 88)  BP: --  ABP:  (114/53 - 170/75)  RR:  (11 - 20)  SpO2:  (94% - 100%)  Wt(kg): --  Device: , Mode: CPAP with PS, FiO2: 40, PEEP: 5, PS: 5, MAP: 8, PIP: 10    01-29-22 @ 07:01  -  01-30-22 @ 07:00  --------------------------------------------------------  IN: 4144.4 mL / OUT: 2000 mL / NET: 2144.4 mL    01-30-22 @ 07:01  -  01-30-22 @ 19:07  --------------------------------------------------------  IN: 1290 mL / OUT: 1600 mL / NET: -310 mL      LABS:  Na: 140 (01-30 @ 18:38), 139 (01-30 @ 10:32), 141 (01-30 @ 02:50), 137 (01-29 @ 18:56), 137 (01-29 @ 10:37), 135 (01-29 @ 01:37), 137 (01-28 @ 19:45), 138 (01-28 @ 00:19)  K: 3.9 (01-30 @ 18:38), 3.8 (01-30 @ 10:32), 4.2 (01-30 @ 02:50), 4.0 (01-29 @ 18:56), 4.0 (01-29 @ 10:37), 4.0 (01-29 @ 01:37), 3.9 (01-28 @ 19:45), 4.3 (01-28 @ 00:19)  Cl: 109 (01-30 @ 18:38), 106 (01-30 @ 10:32), 109 (01-30 @ 02:50), 104 (01-29 @ 18:56), 103 (01-29 @ 10:37), 99 (01-29 @ 01:37), 103 (01-28 @ 19:45), 103 (01-28 @ 00:19)  CO2: 23 (01-30 @ 18:38), 24 (01-30 @ 10:32), 22 (01-30 @ 02:50), 20 (01-29 @ 18:56), 22 (01-29 @ 10:37), 22 (01-29 @ 01:37), 22 (01-28 @ 19:45), 19 (01-28 @ 00:19)  BUN: 14 (01-30 @ 18:38), 14 (01-30 @ 10:32), 15 (01-30 @ 02:50), 16 (01-29 @ 18:56), 12 (01-29 @ 10:37), 12 (01-29 @ 01:37), 12 (01-28 @ 19:45), 12 (01-28 @ 00:19)  Cr: 0.55 (01-30 @ 18:38), 0.52 (01-30 @ 10:32), 0.52 (01-30 @ 02:50), 0.54 (01-29 @ 18:56), 0.54 (01-29 @ 10:37), 0.55 (01-29 @ 01:37), 0.62 (01-28 @ 19:45), 0.80 (01-28 @ 00:19)  Glu: 174(01-30 @ 18:38), 121(01-30 @ 10:32), 134(01-30 @ 02:50), 245(01-29 @ 18:56), 219(01-29 @ 10:37), 258(01-29 @ 01:37), 238(01-28 @ 19:45), 285(01-28 @ 00:19)    Hgb: 10.6 (01-30 @ 10:39), 12.9 (01-28 @ 19:45), 12.8 (01-28 @ 00:18)  Hct: 32.3 (01-30 @ 10:39), 38.7 (01-28 @ 19:45), 38.6 (01-28 @ 00:18)  WBC: 12.07 (01-30 @ 10:39), 14.33 (01-28 @ 19:45), 14.71 (01-28 @ 00:18)  Plt: 98 (01-30 @ 10:39), 106 (01-28 @ 19:45), 109 (01-28 @ 00:18)    MEDICATIONS:  acetaminophen     Tablet .. 650 milliGRAM(s) Oral every 6 hours PRN  atorvastatin 20 milliGRAM(s) Oral at bedtime  bisacodyl 5 milliGRAM(s) Oral daily PRN  chlorhexidine 4% Liquid 1 Application(s) Topical daily  dexAMETHasone  Injectable 4 milliGRAM(s) IV Push every 6 hours  dextrose 50% Injectable 12.5 Gram(s) IV Push once  dextrose 50% Injectable 25 Gram(s) IV Push once  dextrose 50% Injectable 50 milliLiter(s) IV Push every 15 minutes  dextrose 50% Injectable 25 milliLiter(s) IV Push every 15 minutes  enoxaparin Injectable 40 milliGRAM(s) SubCutaneous <User Schedule>  glucagon  Injectable 1 milliGRAM(s) IntraMuscular once  insulin regular Infusion 5 Unit(s)/Hr IV Continuous <Continuous>  labetalol 100 milliGRAM(s) Oral every 8 hours  lacosamide IVPB 200 milliGRAM(s) IV Intermittent every 12 hours  lisinopril 5 milliGRAM(s) Oral daily  polyethylene glycol 3350 17 Gram(s) Oral every 12 hours  senna 2 Tablet(s) Oral at bedtime  sodium chloride 2 Gram(s) Oral every 6 hours  sodium chloride 2% . 1000 milliLiter(s) IV Continuous <Continuous>    EXAMINATION:  General:  in NAD  HEENT:  MMM  Neuro:  awake, alert, oriented x 3, follows commands, EOMI, face symmetric, no PD, DF 5/5   Cards:  RRR  Respiratory:  no respiratory distress  Abdomen:  soft  Extremities:  no LE edema    Assessment/Plan:   67yo man with HTN and DM, POD3 L crani for GBM with gleolan. Last head CT today with post-op changes.     - dex 4q6h for vasogenic edema and HTS per below  - Vimpat 200mg BID for sz ppx  - SBP goal 100-160  - lisinopril 5  - Labetalol 100 q 8  - Fluids: HTS 2%@100  - na goal 145-150  - insulin ggt EVENTS:   Patient seen on evening rounds, no acute events.  Extubated during the day today.     VITALS:  T(C): , Max: 37.3 (01-30-22 @ 11:00)  HR:  (56 - 88)  BP: --  ABP:  (114/53 - 170/75)  RR:  (11 - 20)  SpO2:  (94% - 100%)  Wt(kg): --  Device: , Mode: CPAP with PS, FiO2: 40, PEEP: 5, PS: 5, MAP: 8, PIP: 10    01-29-22 @ 07:01  -  01-30-22 @ 07:00  --------------------------------------------------------  IN: 4144.4 mL / OUT: 2000 mL / NET: 2144.4 mL    01-30-22 @ 07:01  -  01-30-22 @ 19:07  --------------------------------------------------------  IN: 1290 mL / OUT: 1600 mL / NET: -310 mL      LABS:  Na: 140 (01-30 @ 18:38), 139 (01-30 @ 10:32), 141 (01-30 @ 02:50), 137 (01-29 @ 18:56), 137 (01-29 @ 10:37), 135 (01-29 @ 01:37), 137 (01-28 @ 19:45), 138 (01-28 @ 00:19)  K: 3.9 (01-30 @ 18:38), 3.8 (01-30 @ 10:32), 4.2 (01-30 @ 02:50), 4.0 (01-29 @ 18:56), 4.0 (01-29 @ 10:37), 4.0 (01-29 @ 01:37), 3.9 (01-28 @ 19:45), 4.3 (01-28 @ 00:19)  Cl: 109 (01-30 @ 18:38), 106 (01-30 @ 10:32), 109 (01-30 @ 02:50), 104 (01-29 @ 18:56), 103 (01-29 @ 10:37), 99 (01-29 @ 01:37), 103 (01-28 @ 19:45), 103 (01-28 @ 00:19)  CO2: 23 (01-30 @ 18:38), 24 (01-30 @ 10:32), 22 (01-30 @ 02:50), 20 (01-29 @ 18:56), 22 (01-29 @ 10:37), 22 (01-29 @ 01:37), 22 (01-28 @ 19:45), 19 (01-28 @ 00:19)  BUN: 14 (01-30 @ 18:38), 14 (01-30 @ 10:32), 15 (01-30 @ 02:50), 16 (01-29 @ 18:56), 12 (01-29 @ 10:37), 12 (01-29 @ 01:37), 12 (01-28 @ 19:45), 12 (01-28 @ 00:19)  Cr: 0.55 (01-30 @ 18:38), 0.52 (01-30 @ 10:32), 0.52 (01-30 @ 02:50), 0.54 (01-29 @ 18:56), 0.54 (01-29 @ 10:37), 0.55 (01-29 @ 01:37), 0.62 (01-28 @ 19:45), 0.80 (01-28 @ 00:19)  Glu: 174(01-30 @ 18:38), 121(01-30 @ 10:32), 134(01-30 @ 02:50), 245(01-29 @ 18:56), 219(01-29 @ 10:37), 258(01-29 @ 01:37), 238(01-28 @ 19:45), 285(01-28 @ 00:19)    Hgb: 10.6 (01-30 @ 10:39), 12.9 (01-28 @ 19:45), 12.8 (01-28 @ 00:18)  Hct: 32.3 (01-30 @ 10:39), 38.7 (01-28 @ 19:45), 38.6 (01-28 @ 00:18)  WBC: 12.07 (01-30 @ 10:39), 14.33 (01-28 @ 19:45), 14.71 (01-28 @ 00:18)  Plt: 98 (01-30 @ 10:39), 106 (01-28 @ 19:45), 109 (01-28 @ 00:18)    MEDICATIONS:  acetaminophen     Tablet .. 650 milliGRAM(s) Oral every 6 hours PRN  atorvastatin 20 milliGRAM(s) Oral at bedtime  bisacodyl 5 milliGRAM(s) Oral daily PRN  chlorhexidine 4% Liquid 1 Application(s) Topical daily  dexAMETHasone  Injectable 4 milliGRAM(s) IV Push every 6 hours  dextrose 50% Injectable 12.5 Gram(s) IV Push once  dextrose 50% Injectable 25 Gram(s) IV Push once  dextrose 50% Injectable 50 milliLiter(s) IV Push every 15 minutes  dextrose 50% Injectable 25 milliLiter(s) IV Push every 15 minutes  enoxaparin Injectable 40 milliGRAM(s) SubCutaneous <User Schedule>  glucagon  Injectable 1 milliGRAM(s) IntraMuscular once  insulin regular Infusion 5 Unit(s)/Hr IV Continuous <Continuous>  labetalol 100 milliGRAM(s) Oral every 8 hours  lacosamide IVPB 200 milliGRAM(s) IV Intermittent every 12 hours  lisinopril 5 milliGRAM(s) Oral daily  polyethylene glycol 3350 17 Gram(s) Oral every 12 hours  senna 2 Tablet(s) Oral at bedtime  sodium chloride 2 Gram(s) Oral every 6 hours  sodium chloride 2% . 1000 milliLiter(s) IV Continuous <Continuous>    EXAMINATION:  General:  in NAD  HEENT:  MMM  Neuro:  awake, alert, oriented to self only, follows simple commands, EOMI, BTT b/l, face symmetric, with drift in b/l arms, 5/5 L triceps, 5-/5 R triceps, able to lift b/l legs antigravity   Cards:  RRR  Respiratory:  no respiratory distress  Abdomen:  soft  Extremities:  no LE edema    Assessment/Plan:   67yo man with HTN and DM, POD3 L crani for GBM with gleolan. Last head CT today with post-op changes with 9mm MLS.      - q1h NC  - dex 4q6h for vasogenic edema and HTS per below  - Vimpat 200mg BID for sz ppx and LE shaking concerning for possible seizure   - SBP goal 100-160  - increase lisinopril to 10mg daily with goal to stop labetalol 100 q 8  - Fluids: HTS 2%@100, increase salt tabs 3g q6h and add 3% at 30cc/hr, Na goal 145-150  - insulin ggt  - start TF   - Lov ppx    Peripherals and Kaylah     Patient seen and examined by attending on 1/30/2022.    Patient is critically ill due to GBM resection and at high risk for neurological deterioration or death due to: significant vasogenic edema, midline shift of brain requiring steroids and hypertonics, at high risk for seizures, with hyperglycemia requiring an Insulin ggt.

## 2022-01-30 NOTE — PHYSICAL THERAPY INITIAL EVALUATION ADULT - IMPAIRMENTS CONTRIBUTING TO GAIT DEVIATIONS, PT EVAL
impaired balance/decreased strength
slightly impulsive/impaired balance/cognition/decreased strength

## 2022-01-30 NOTE — PHYSICAL THERAPY INITIAL EVALUATION ADULT - IMPAIRED TRANSFERS: SIT/STAND, REHAB EVAL
slightly impulsive/impaired balance/cognition/decreased strength
inattention RUE with assist to place R hand on walker/impaired balance/decreased strength

## 2022-01-30 NOTE — PHYSICAL THERAPY INITIAL EVALUATION ADULT - IMPAIRMENTS FOUND, PT EVAL
gait, locomotion, and balance/muscle strength
inattention R side/gait, locomotion, and balance/muscle strength/poor safety awareness

## 2022-01-30 NOTE — PHYSICAL THERAPY INITIAL EVALUATION ADULT - ASSISTIVE DEVICE FOR TRANSFER: STAND/SIT, REHAB EVAL
Patient wants to speak with Dr. Esparza regarding coming home Monday instead of Tuesday. Please contact patient at [phone]   1/31/rolling walker

## 2022-01-30 NOTE — PHYSICAL THERAPY INITIAL EVALUATION ADULT - FOLLOWS COMMANDS/ANSWERS QUESTIONS, REHAB EVAL
simple commands in Scottish  with occasional 2-3sec delay/75% of the time
pt refused  phone, simple commands in English with occasional need for Turkmen/75% of the time

## 2022-01-30 NOTE — PHYSICAL THERAPY INITIAL EVALUATION ADULT - RANGE OF MOTION EXAMINATION, REHAB EVAL
c/o pain with L shoulder flexion/bilateral upper extremity ROM was WFL (within functional limits)/bilateral lower extremity ROM was WFL (within functional limits)

## 2022-01-31 LAB
ANION GAP SERPL CALC-SCNC: 8 MMOL/L — SIGNIFICANT CHANGE UP (ref 5–17)
ANION GAP SERPL CALC-SCNC: 8 MMOL/L — SIGNIFICANT CHANGE UP (ref 5–17)
ANION GAP SERPL CALC-SCNC: 9 MMOL/L — SIGNIFICANT CHANGE UP (ref 5–17)
BUN SERPL-MCNC: 15 MG/DL — SIGNIFICANT CHANGE UP (ref 7–23)
BUN SERPL-MCNC: 16 MG/DL — SIGNIFICANT CHANGE UP (ref 7–23)
BUN SERPL-MCNC: 19 MG/DL — SIGNIFICANT CHANGE UP (ref 7–23)
CALCIUM SERPL-MCNC: 7.8 MG/DL — LOW (ref 8.4–10.5)
CALCIUM SERPL-MCNC: 8.1 MG/DL — LOW (ref 8.4–10.5)
CALCIUM SERPL-MCNC: 8.5 MG/DL — SIGNIFICANT CHANGE UP (ref 8.4–10.5)
CHLORIDE SERPL-SCNC: 108 MMOL/L — SIGNIFICANT CHANGE UP (ref 96–108)
CHLORIDE SERPL-SCNC: 110 MMOL/L — HIGH (ref 96–108)
CHLORIDE SERPL-SCNC: 111 MMOL/L — HIGH (ref 96–108)
CO2 SERPL-SCNC: 22 MMOL/L — SIGNIFICANT CHANGE UP (ref 22–31)
CO2 SERPL-SCNC: 22 MMOL/L — SIGNIFICANT CHANGE UP (ref 22–31)
CO2 SERPL-SCNC: 23 MMOL/L — SIGNIFICANT CHANGE UP (ref 22–31)
CREAT SERPL-MCNC: 0.55 MG/DL — SIGNIFICANT CHANGE UP (ref 0.5–1.3)
CREAT SERPL-MCNC: 0.57 MG/DL — SIGNIFICANT CHANGE UP (ref 0.5–1.3)
CREAT SERPL-MCNC: 0.57 MG/DL — SIGNIFICANT CHANGE UP (ref 0.5–1.3)
GLUCOSE BLDC GLUCOMTR-MCNC: 107 MG/DL — HIGH (ref 70–99)
GLUCOSE BLDC GLUCOMTR-MCNC: 109 MG/DL — HIGH (ref 70–99)
GLUCOSE BLDC GLUCOMTR-MCNC: 112 MG/DL — HIGH (ref 70–99)
GLUCOSE BLDC GLUCOMTR-MCNC: 116 MG/DL — HIGH (ref 70–99)
GLUCOSE BLDC GLUCOMTR-MCNC: 121 MG/DL — HIGH (ref 70–99)
GLUCOSE BLDC GLUCOMTR-MCNC: 125 MG/DL — HIGH (ref 70–99)
GLUCOSE BLDC GLUCOMTR-MCNC: 127 MG/DL — HIGH (ref 70–99)
GLUCOSE BLDC GLUCOMTR-MCNC: 145 MG/DL — HIGH (ref 70–99)
GLUCOSE BLDC GLUCOMTR-MCNC: 149 MG/DL — HIGH (ref 70–99)
GLUCOSE BLDC GLUCOMTR-MCNC: 165 MG/DL — HIGH (ref 70–99)
GLUCOSE BLDC GLUCOMTR-MCNC: 169 MG/DL — HIGH (ref 70–99)
GLUCOSE BLDC GLUCOMTR-MCNC: 173 MG/DL — HIGH (ref 70–99)
GLUCOSE BLDC GLUCOMTR-MCNC: 178 MG/DL — HIGH (ref 70–99)
GLUCOSE BLDC GLUCOMTR-MCNC: 237 MG/DL — HIGH (ref 70–99)
GLUCOSE SERPL-MCNC: 105 MG/DL — HIGH (ref 70–99)
GLUCOSE SERPL-MCNC: 173 MG/DL — HIGH (ref 70–99)
GLUCOSE SERPL-MCNC: 255 MG/DL — HIGH (ref 70–99)
HCT VFR BLD CALC: 31 % — LOW (ref 39–50)
HGB BLD-MCNC: 10.1 G/DL — LOW (ref 13–17)
MAGNESIUM SERPL-MCNC: 1.8 MG/DL — SIGNIFICANT CHANGE UP (ref 1.6–2.6)
MAGNESIUM SERPL-MCNC: 2.1 MG/DL — SIGNIFICANT CHANGE UP (ref 1.6–2.6)
MAGNESIUM SERPL-MCNC: 2.3 MG/DL — SIGNIFICANT CHANGE UP (ref 1.6–2.6)
MCHC RBC-ENTMCNC: 30.7 PG — SIGNIFICANT CHANGE UP (ref 27–34)
MCHC RBC-ENTMCNC: 32.6 GM/DL — SIGNIFICANT CHANGE UP (ref 32–36)
MCV RBC AUTO: 94.2 FL — SIGNIFICANT CHANGE UP (ref 80–100)
NRBC # BLD: 0 /100 WBCS — SIGNIFICANT CHANGE UP (ref 0–0)
PHOSPHATE SERPL-MCNC: 2.8 MG/DL — SIGNIFICANT CHANGE UP (ref 2.5–4.5)
PHOSPHATE SERPL-MCNC: 3.1 MG/DL — SIGNIFICANT CHANGE UP (ref 2.5–4.5)
PHOSPHATE SERPL-MCNC: 3.1 MG/DL — SIGNIFICANT CHANGE UP (ref 2.5–4.5)
PLATELET # BLD AUTO: 114 K/UL — LOW (ref 150–400)
POTASSIUM SERPL-MCNC: 3.6 MMOL/L — SIGNIFICANT CHANGE UP (ref 3.5–5.3)
POTASSIUM SERPL-MCNC: 3.8 MMOL/L — SIGNIFICANT CHANGE UP (ref 3.5–5.3)
POTASSIUM SERPL-MCNC: 3.9 MMOL/L — SIGNIFICANT CHANGE UP (ref 3.5–5.3)
POTASSIUM SERPL-SCNC: 3.6 MMOL/L — SIGNIFICANT CHANGE UP (ref 3.5–5.3)
POTASSIUM SERPL-SCNC: 3.8 MMOL/L — SIGNIFICANT CHANGE UP (ref 3.5–5.3)
POTASSIUM SERPL-SCNC: 3.9 MMOL/L — SIGNIFICANT CHANGE UP (ref 3.5–5.3)
RBC # BLD: 3.29 M/UL — LOW (ref 4.2–5.8)
RBC # FLD: 11.6 % — SIGNIFICANT CHANGE UP (ref 10.3–14.5)
SODIUM SERPL-SCNC: 140 MMOL/L — SIGNIFICANT CHANGE UP (ref 135–145)
SODIUM SERPL-SCNC: 140 MMOL/L — SIGNIFICANT CHANGE UP (ref 135–145)
SODIUM SERPL-SCNC: 141 MMOL/L — SIGNIFICANT CHANGE UP (ref 135–145)
WBC # BLD: 9.64 K/UL — SIGNIFICANT CHANGE UP (ref 3.8–10.5)
WBC # FLD AUTO: 9.64 K/UL — SIGNIFICANT CHANGE UP (ref 3.8–10.5)

## 2022-01-31 PROCEDURE — 99233 SBSQ HOSP IP/OBS HIGH 50: CPT

## 2022-01-31 PROCEDURE — 71045 X-RAY EXAM CHEST 1 VIEW: CPT | Mod: 26

## 2022-01-31 RX ORDER — HUMAN INSULIN 100 [IU]/ML
4 INJECTION, SUSPENSION SUBCUTANEOUS ONCE
Refills: 0 | Status: COMPLETED | OUTPATIENT
Start: 2022-01-31 | End: 2022-01-31

## 2022-01-31 RX ORDER — MAGNESIUM SULFATE 500 MG/ML
2 VIAL (ML) INJECTION ONCE
Refills: 0 | Status: COMPLETED | OUTPATIENT
Start: 2022-01-31 | End: 2022-01-31

## 2022-01-31 RX ORDER — HUMAN INSULIN 100 [IU]/ML
10 INJECTION, SUSPENSION SUBCUTANEOUS EVERY 6 HOURS
Refills: 0 | Status: DISCONTINUED | OUTPATIENT
Start: 2022-01-31 | End: 2022-02-02

## 2022-01-31 RX ORDER — SODIUM CHLORIDE 9 MG/ML
1000 INJECTION, SOLUTION INTRAVENOUS
Refills: 0 | Status: DISCONTINUED | OUTPATIENT
Start: 2022-01-31 | End: 2022-02-08

## 2022-01-31 RX ORDER — LACOSAMIDE 50 MG/1
200 TABLET ORAL
Refills: 0 | Status: DISCONTINUED | OUTPATIENT
Start: 2022-01-31 | End: 2022-01-31

## 2022-01-31 RX ORDER — HUMAN INSULIN 100 [IU]/ML
8 INJECTION, SUSPENSION SUBCUTANEOUS EVERY 6 HOURS
Refills: 0 | Status: DISCONTINUED | OUTPATIENT
Start: 2022-01-31 | End: 2022-01-31

## 2022-01-31 RX ORDER — DEXAMETHASONE 0.5 MG/5ML
3 ELIXIR ORAL EVERY 6 HOURS
Refills: 0 | Status: DISCONTINUED | OUTPATIENT
Start: 2022-01-31 | End: 2022-02-01

## 2022-01-31 RX ORDER — INSULIN LISPRO 100/ML
VIAL (ML) SUBCUTANEOUS EVERY 6 HOURS
Refills: 0 | Status: DISCONTINUED | OUTPATIENT
Start: 2022-01-31 | End: 2022-01-31

## 2022-01-31 RX ORDER — LACOSAMIDE 50 MG/1
100 TABLET ORAL
Refills: 0 | Status: DISCONTINUED | OUTPATIENT
Start: 2022-01-31 | End: 2022-02-04

## 2022-01-31 RX ORDER — POTASSIUM CHLORIDE 20 MEQ
40 PACKET (EA) ORAL ONCE
Refills: 0 | Status: DISCONTINUED | OUTPATIENT
Start: 2022-01-31 | End: 2022-01-31

## 2022-01-31 RX ORDER — POTASSIUM CHLORIDE 20 MEQ
30 PACKET (EA) ORAL ONCE
Refills: 0 | Status: COMPLETED | OUTPATIENT
Start: 2022-01-31 | End: 2022-01-31

## 2022-01-31 RX ORDER — DEXTROSE 50 % IN WATER 50 %
15 SYRINGE (ML) INTRAVENOUS ONCE
Refills: 0 | Status: DISCONTINUED | OUTPATIENT
Start: 2022-01-31 | End: 2022-02-08

## 2022-01-31 RX ORDER — INSULIN LISPRO 100/ML
VIAL (ML) SUBCUTANEOUS EVERY 6 HOURS
Refills: 0 | Status: DISCONTINUED | OUTPATIENT
Start: 2022-01-31 | End: 2022-02-02

## 2022-01-31 RX ORDER — POTASSIUM CHLORIDE 20 MEQ
40 PACKET (EA) ORAL ONCE
Refills: 0 | Status: COMPLETED | OUTPATIENT
Start: 2022-01-31 | End: 2022-01-31

## 2022-01-31 RX ADMIN — SODIUM CHLORIDE 3 GRAM(S): 9 INJECTION INTRAMUSCULAR; INTRAVENOUS; SUBCUTANEOUS at 17:16

## 2022-01-31 RX ADMIN — INSULIN HUMAN 5 UNIT(S)/HR: 100 INJECTION, SOLUTION SUBCUTANEOUS at 09:02

## 2022-01-31 RX ADMIN — Medication 4 MILLIGRAM(S): at 06:04

## 2022-01-31 RX ADMIN — HUMAN INSULIN 10 UNIT(S): 100 INJECTION, SUSPENSION SUBCUTANEOUS at 17:45

## 2022-01-31 RX ADMIN — SODIUM CHLORIDE 3 GRAM(S): 9 INJECTION INTRAMUSCULAR; INTRAVENOUS; SUBCUTANEOUS at 06:04

## 2022-01-31 RX ADMIN — HUMAN INSULIN 8 UNIT(S): 100 INJECTION, SUSPENSION SUBCUTANEOUS at 09:07

## 2022-01-31 RX ADMIN — CHLORHEXIDINE GLUCONATE 1 APPLICATION(S): 213 SOLUTION TOPICAL at 12:59

## 2022-01-31 RX ADMIN — LISINOPRIL 10 MILLIGRAM(S): 2.5 TABLET ORAL at 06:05

## 2022-01-31 RX ADMIN — SODIUM CHLORIDE 3 GRAM(S): 9 INJECTION INTRAMUSCULAR; INTRAVENOUS; SUBCUTANEOUS at 00:19

## 2022-01-31 RX ADMIN — Medication 3 MILLIGRAM(S): at 23:59

## 2022-01-31 RX ADMIN — LACOSAMIDE 140 MILLIGRAM(S): 50 TABLET ORAL at 06:04

## 2022-01-31 RX ADMIN — Medication 3 MILLIGRAM(S): at 12:59

## 2022-01-31 RX ADMIN — SODIUM CHLORIDE 100 MILLILITER(S): 5 INJECTION, SOLUTION INTRAVENOUS at 09:02

## 2022-01-31 RX ADMIN — ENOXAPARIN SODIUM 40 MILLIGRAM(S): 100 INJECTION SUBCUTANEOUS at 17:15

## 2022-01-31 RX ADMIN — Medication 4 MILLIGRAM(S): at 02:15

## 2022-01-31 RX ADMIN — SODIUM CHLORIDE 3 GRAM(S): 9 INJECTION INTRAMUSCULAR; INTRAVENOUS; SUBCUTANEOUS at 23:59

## 2022-01-31 RX ADMIN — Medication 30 MILLIEQUIVALENT(S): at 23:59

## 2022-01-31 RX ADMIN — SODIUM CHLORIDE 50 MILLILITER(S): 5 INJECTION, SOLUTION INTRAVENOUS at 10:00

## 2022-01-31 RX ADMIN — Medication 25 GRAM(S): at 06:04

## 2022-01-31 RX ADMIN — Medication 3 MILLIGRAM(S): at 17:15

## 2022-01-31 RX ADMIN — POLYETHYLENE GLYCOL 3350 17 GRAM(S): 17 POWDER, FOR SOLUTION ORAL at 06:04

## 2022-01-31 RX ADMIN — Medication 40 MILLIEQUIVALENT(S): at 06:04

## 2022-01-31 RX ADMIN — LACOSAMIDE 100 MILLIGRAM(S): 50 TABLET ORAL at 17:24

## 2022-01-31 RX ADMIN — PANTOPRAZOLE SODIUM 40 MILLIGRAM(S): 20 TABLET, DELAYED RELEASE ORAL at 23:59

## 2022-01-31 RX ADMIN — HUMAN INSULIN 4 UNIT(S): 100 INJECTION, SUSPENSION SUBCUTANEOUS at 13:57

## 2022-01-31 RX ADMIN — SODIUM CHLORIDE 3 GRAM(S): 9 INJECTION INTRAMUSCULAR; INTRAVENOUS; SUBCUTANEOUS at 12:54

## 2022-01-31 RX ADMIN — Medication 2: at 17:30

## 2022-01-31 NOTE — SWALLOW BEDSIDE ASSESSMENT ADULT - COMMENTS
1/27: s/p L crani for GBM with gleolan. after OR b/l LE shaking concerning for seizure, CTH stable, given ativan, loaded with keppra and continued with vimpat  1/30: Extubated     Exam: Awake, Ox2 (name, place), hypophonic, pupils 3mm reactive, FC, R tricep 4+/5 with RUE drift, all other uppers 5/5, lowers AG to commands

## 2022-01-31 NOTE — SPEECH LANGUAGE PATHOLOGY EVALUATION - COMMENTS
1/27: s/p L crani for GBM with gleolan. after OR b/l LE shaking concerning for seizure, CTH stable, given ativan, loaded with keppra and continued with vimpat  1/30: Extubated     Exam: Awake, Ox2 (name, place), hypophonic, pupils 3mm reactive, FC, R tricep 4+/5 with RUE drift, all other uppers 5/5, lowers AG to commands Pt will benefit from skilled ST services upon next level of care for cognitive-linguistic therapy. Paucity of spontaneous verbal output DNT

## 2022-01-31 NOTE — PROGRESS NOTE ADULT - ASSESSMENT
ASSESSMENT/PLAN:    66M presenting with brain mass with significant vasogenic edema with brain compression and midline shift     NEURO:  - neurochecks q1h  - dex 4q6h for vasogenic edema and HTS per below  - Vimpat 200mg BID for sz ppx  - CTH and MRI done, small bleed at the tumor bed  -CT head today stable  - pain control    CVS:  - SBP goal 100-160  - lisinopril 5  - Labetalol 100 q 8    PULM:  - ETT to vent  - ABG, CXR, extubation today   - CPAP as tolerated, able to tolerate 10/5 today and not taking good volumes on 5/5      RENAL:  - Fluids: HTS 2%@100  - na goal 145-150  - daily IOs  - BMP q8h    GI:  - Diet: NPO for possible extubation  - GI prophylaxis: Protonix  - Bowel regimen: miralax, senna    ENDO:   - FS goal 120-180  -Hyperglycemia   - insulin drip at 2U/hr then bridge to SC insulin      HEME/ONC:  - SCDs  - Chemoppx: Start Lovenox at 40    ID:  - monitor for fevers      Patient is at high risk of neurologic deterioration/death due to:  cerebral edema, brain compression, seizures   ASSESSMENT/PLAN:    66M presenting with brain mass with significant vasogenic edema with brain compression and midline shift     NEURO:  - neurochecks q4h  - dex 3q6h for vasogenic edema and HTS per below  - Vimpat decrease 100mg BID for sz ppx  - CTH and MRI done, small bleed at the tumor bed  - pain control  -follow up pathology     CVS:  - SBP goal 100-160  - lisinopril 10mg     PULM:  extubated 1/30 now on RA     RENAL:  - Fluids: HTS 2%@100 decrease to 50cc/hr   - na goal 140-150  - daily IOs  - BMP q8h    GI:  - Diet: started tube feeds   -S&S to follow   - GI prophylaxis: Protonix  - Bowel regimen: miralax, senna    ENDO:   - FS goal 120-180  -Hyperglycemia   - insulin drip at 2U/hr then bridge to SC insulin today      HEME/ONC:  - SCDs  - Chemoppx: Lovenox at 40    ID:  - monitor for fevers      Patient is at high risk of neurologic deterioration/death due to:  cerebral edema, brain compression, seizures

## 2022-01-31 NOTE — SPEECH LANGUAGE PATHOLOGY EVALUATION - SLP DIAGNOSIS
Pt presents with cognitive-linguistic, receptive, and expressive language deficits. Pt consistently perseverating on initial prompt/cue in each task, making it difficult to  competency; i.e., patient able to name initial object/ picture presented, but unable to name 2/2 perseverating on first. This behavior noted across simple directives, simple yes/no questions, responsive naming, and body ID. Pt with flat affect and poor eye contact. No evidence of dysarthria. Vocal quality WFL.

## 2022-01-31 NOTE — SWALLOW BEDSIDE ASSESSMENT ADULT - ADDITIONAL RECOMMENDATIONS
Maintain good oral hygiene.    GOAL: Pt/caregivers will demonstrate understanding of dysphagia management/ plan of care.

## 2022-01-31 NOTE — SWALLOW BEDSIDE ASSESSMENT ADULT - SWALLOW EVAL: DIAGNOSIS
66M s/p L crani for GBM w/ gleolan, now extubated. Pt presents with a suspected oropharyngeal dysphagia, superimposed upon cognitive/linguistic deficits. No palpable pharyngeal swallow trigger with minimal/conservative PO trials, requiring clinician to suction PO from oral cavity. Not a candidate for a PO diet/ further PO trials at this time.

## 2022-01-31 NOTE — SWALLOW BEDSIDE ASSESSMENT ADULT - SLP PERTINENT HISTORY OF CURRENT PROBLEM
66M hx HTN/DM was in the [facility] [place] early January, was confused and not answering appropriately, MRI Jan 7 showed L frontal enhancing mass with significant edema. Repeat CTH showed significant vasogenic edema and 1.5cm MLS

## 2022-01-31 NOTE — SPEECH LANGUAGE PATHOLOGY EVALUATION - SLP PERTINENT HISTORY OF CURRENT PROBLEM
66M hx HTN/DM was in the [facility] [place] for vacation early January, was confused and not answering appropriately, MRI Jan 7 showed L frontal enhancing mass with significant edema. Repeat CTH showed significant vasogenic edema and 1.5cm MLS

## 2022-01-31 NOTE — PROGRESS NOTE ADULT - SUBJECTIVE AND OBJECTIVE BOX
Patient seen and examined at bedside.    --Anticoagulation--  enoxaparin Injectable 40 milliGRAM(s) SubCutaneous <User Schedule>    T(C): 36.6 (01-30-22 @ 15:00), Max: 37.3 (01-30-22 @ 11:00)  HR: 74 (01-31-22 @ 02:00) (56 - 85)  BP: --  RR: 17 (01-31-22 @ 02:00) (11 - 20)  SpO2: 97% (01-31-22 @ 02:00) (94% - 100%)  Wt(kg): --    Exam:  Awake, Ox2 (name, place), hypophonic, pupils 3mm reactive, FC, R tricep 4+/5 with RUE drift, all other uppers 5/5, lowers AG to commands    Labs:                        10.6   12.07 )-----------( 98       ( 30 Jan 2022 10:39 )             32.3     01-30    140  |  109<H>  |  14  ----------------------------<  174<H>  3.9   |  23  |  0.55    Ca    7.9<L>      30 Jan 2022 18:38  Phos  2.7     01-30  Mg     2.0     01-30

## 2022-01-31 NOTE — SWALLOW BEDSIDE ASSESSMENT ADULT - SLP GENERAL OBSERVATIONS
Pt encountered in bed, awake, on room air. +NGT. VSS. English/Hungarian speaking, video  utilized. Flat affect. Pt oriented to self, only. Follows 50% of simple 1-step commands. Vocal quality clear.

## 2022-01-31 NOTE — CHART NOTE - NSCHARTNOTEFT_GEN_A_CORE
Nutrition Follow Up Note  Patient seen for: nutrition follow up on NSCU    Chart reviewed, events noted. "66M hx HTN/DM was [place] vacation early January, was confused and not answering appropriately, MRI Jan 7 showed L frontal enhancing mass with significant edema. Repeat CTH showed significant vasogenic edema and 1.5cm MLS. POD3 L crani for GBM with gleolan"    Source: [] Patient       [x] EMR        [] RN        [] Family at bedside       [X] Other: extubated 1/30  Pt is [place] speaking with cognitive/linguistic deficits.    Diet Order:   Diet, NPO with Tube Feed:   Tube Feeding Modality: Nasogastric  Glucerna 1.2 Jeb (GLUCERNARTH)  Total Volume for 24 Hours (mL): 1440  Continuous  Starting Tube Feed Rate {mL per Hour}: 20  Increase Tube Feed Rate by (mL): 10     Every 4 hours  Until Goal Tube Feed Rate (mL per Hour): 60  Tube Feed Duration (in Hours): 24  Tube Feed Start Time: 21:30 (01-30-22)    EN Order Provides: 1440 ml formula, 1728 calories (22 kcal/kg), 86 grams protein (1.1 gm/kg), 1159 ml free water; based on dosing wt 78 kg    Current Pump Rate: 10 ml/hr    Is current diet order appropriate/adequate? [X] No, current EN order does not meet increased nutrient needs.   See EN recommendations below.    PO intake :   Pt ordered for Consistent Carbohydrate diet (1/25-1/26); NPO for most of 7-day admission    Nutrition-related concerns:  - Speech and Swallow evaluation 1/31: "Pt presents with sandoval-pharyngeal dysphagia and cognitive/linguistic deficits. Continue NPO, with non-oral nutrition/hydration/medications."  - Enteral Nutrition: trophic EN @ 10 ml/hr initiated 1/31  - Ordered for 2% NaCl @ 50 ml/hr, and 3 gm NaCl via NGT q6 hrs for Na goal 140-150  - Hyperglycemia (HbA1c 10.7%) addressed with NPH 8 units q6 hrs, NPH 4 units (1 dose), and SSI q6 hrs. Insulin infusion ordered 1/29.  - Decadron ordered 3 mg q6 hrs    GI:  Last BM1/30 (x5), 1/31.   Bowel Regimen? [X] Yes: Miralax, senna, Dulcolax    Weights:   Daily Weight: no new weight  DOSING Weight: 78 kg (01-26)  IBW: 75.2 kg    MEDICATIONS  (STANDING):  atorvastatin  dexAMETHasone  Injectable  dextrose 40% Gel  dextrose 5%.  dextrose 5%.  dextrose 50% Injectable  dextrose 50% Injectable  dextrose 50% Injectable  dextrose 50% Injectable  glucagon  Injectable  insulin lispro (ADMELOG) corrective regimen sliding scale  insulin NPH human recombinant  insulin NPH human recombinant  insulin regular Infusion  lisinopril  pantoprazole  Injectable  polyethylene glycol 3350  senna  sodium chloride  sodium chloride 2% .    Pertinent Labs: 01-31 @ 10:12: Na 141, BUN 16, Cr 0.57, <H>, K+ 3.9, Phos 2.8, Mg 2.3,   01-31 @ 02:58: Na 140, BUN 15, Cr 0.55, <H>, K+ 3.6, Phos 3.1, Mg 1.8,   01-30 @ 18:38: Na 140, BUN 14, Cr 0.55, <H>, K+ 3.9, Phos 2.7, Mg 2.0,     A1C with Estimated Average Glucose Result: 10.7 % (01-25-22 @ 10:00)    Finger Sticks:  POCT Blood Glucose.: 178 mg/dL (01-31 @ 13:06)  POCT Blood Glucose.: 173 mg/dL (01-31 @ 11:05)  POCT Blood Glucose.: 145 mg/dL (01-31 @ 10:05)  POCT Blood Glucose.: 169 mg/dL (01-31 @ 09:03)  POCT Blood Glucose.: 165 mg/dL (01-31 @ 07:59)  POCT Blood Glucose.: 149 mg/dL (01-31 @ 07:01)  POCT Blood Glucose.: 127 mg/dL (01-31 @ 06:15)  POCT Blood Glucose.: 125 mg/dL (01-31 @ 05:14)  POCT Blood Glucose.: 116 mg/dL (01-31 @ 04:11)  POCT Blood Glucose.: 112 mg/dL (01-31 @ 03:05)  POCT Blood Glucose.: 107 mg/dL (01-31 @ 02:02)  POCT Blood Glucose.: 109 mg/dL (01-31 @ 01:07)  POCT Blood Glucose.: 121 mg/dL (01-31 @ 00:07)  POCT Blood Glucose.: 145 mg/dL (01-30 @ 23:27)  POCT Blood Glucose.: 163 mg/dL (01-30 @ 22:21)  POCT Blood Glucose.: 166 mg/dL (01-30 @ 21:24)  POCT Blood Glucose.: 165 mg/dL (01-30 @ 19:57)  POCT Blood Glucose.: 173 mg/dL (01-30 @ 18:56)  POCT Blood Glucose.: 160 mg/dL (01-30 @ 18:50)  POCT Blood Glucose.: 126 mg/dL (01-30 @ 18:08)  POCT Blood Glucose.: 137 mg/dL (01-30 @ 17:02)  POCT Blood Glucose.: 142 mg/dL (01-30 @ 16:01)  POCT Blood Glucose.: 113 mg/dL (01-30 @ 14:55)  POCT Blood Glucose.: 123 mg/dL (01-30 @ 14:12)    Skin per nursing documentation: no pressure injuries documented  Edema: no edema documented     Estimated Nutrition Needs: based on dosing wt 78 kg  Energy Needs: 1658-7296 kcal (25-30 kcal/kg)  Protein Needs:  grams (1.2-1.4 gm/kg)    Previous Nutrition Diagnosis: 1) Altered Nutrition Related Lab Values 2) Increased Nutrient Needs  Nutrition Diagnosis is: [X] ongoing; addressed with carbohydrate controlled enteral formula    New Nutrition Diagnosis: Dysphagia related to brain mass, s/p Left frontal craniotomy and removal of tumor 1/27/22, as evidenced by Speech and Swallow evaluation of sandoval-pharyngeal dysphagia and NPO order    Nutrition Care Plan:  [X] In Progress  [] Achieved  [] Not applicable    Nutrition Interventions:     Education Provided:       [] Yes:  [X] No: n/a       Recommendations:      1. Change formula to Glucerna1.5, advance to goal as tolerated: Glucerna1.5 @ 55 ml/hr x 24 hrs to provide 1320ml formula, 1980 kcal (25 kcal/kg), 109 gm protein (1.4 gm/kg), 1002ml free water, based on dosing wt 78kg  2. Monitor future swallow evaluations      Monitoring and Evaluation:   Continue to monitor nutritional intake, tolerance to diet prescription, weights, labs, skin integrity      RD remains available upon request and will follow up per protocol  Ronda Pang, MS RD CDN [facility] (Pager #[phone]) Nutrition Follow Up Note  Patient seen for: nutrition follow up on NSCU    Chart reviewed, events noted. "66M hx HTN/DM [place] early January, was confused and not answering appropriately, MRI Jan 7 showed L frontal enhancing mass with significant edema. Repeat CTH showed significant vasogenic edema and 1.5cm MLS. POD3 L crani for GBM with gleolan"    Source: [] Patient       [x] EMR        [] RN        [] Family at bedside       [X] Other: extubated 1/30  Pt is [place] speaking with cognitive/linguistic deficits; pt nods that he feels "ok".    Diet Order:   Diet, NPO with Tube Feed:   Tube Feeding Modality: Nasogastric  Glucerna 1.2 Jeb (GLUCERNARTH)  Total Volume for 24 Hours (mL): 1440  Continuous  Starting Tube Feed Rate {mL per Hour}: 20  Increase Tube Feed Rate by (mL): 10     Every 4 hours  Until Goal Tube Feed Rate (mL per Hour): 60  Tube Feed Duration (in Hours): 24  Tube Feed Start Time: 21:30 (01-30-22)    EN Order Provides: 1440 ml formula, 1728 calories (22 kcal/kg), 86 grams protein (1.1 gm/kg), 1159 ml free water; based on dosing wt 78 kg    Current Pump Rate: 10 ml/hr --> advanced to 30 ml/hr    Is current diet order appropriate/adequate? [X] No, current EN order does not meet increased nutrient needs.   See EN recommendations below.    PO intake :   Pt ordered for Consistent Carbohydrate diet (1/25-1/26); NPO for most of 7-day admission    Nutrition-related concerns:  - Speech and Swallow evaluation 1/31: "Pt presents with sandoval-pharyngeal dysphagia and cognitive/linguistic deficits. Continue NPO, with non-oral nutrition/hydration/medications."  - Enteral Nutrition: trophic EN @ 10 ml/hr initiated 1/31  - Ordered for 2% NaCl @ 50 ml/hr, and 3 gm NaCl via NGT q6 hrs for Na goal 140-150  - Hyperglycemia (HbA1c 10.7%) addressed with NPH 8 units q6 hrs, NPH 4 units (1 dose), and SSI q6 hrs. Insulin infusion ordered 1/29.  - Decadron ordered 3 mg q6 hrs    GI:  Last BM1/30 (x5), 1/31.   Bowel Regimen? [X] Yes: Miralax, senna, Dulcolax    Weights:   Daily Weight: no new weight  DOSING Weight: 78 kg (01-26)  IBW: 75.2 kg    MEDICATIONS  (STANDING):  atorvastatin  dexAMETHasone  Injectable  dextrose 40% Gel  dextrose 5%.  dextrose 5%.  dextrose 50% Injectable  dextrose 50% Injectable  dextrose 50% Injectable  dextrose 50% Injectable  glucagon  Injectable  insulin lispro (ADMELOG) corrective regimen sliding scale  insulin NPH human recombinant  insulin NPH human recombinant  insulin regular Infusion  lisinopril  pantoprazole  Injectable  polyethylene glycol 3350  senna  sodium chloride  sodium chloride 2% .    Pertinent Labs: 01-31 @ 10:12: Na 141, BUN 16, Cr 0.57, <H>, K+ 3.9, Phos 2.8, Mg 2.3,   01-31 @ 02:58: Na 140, BUN 15, Cr 0.55, <H>, K+ 3.6, Phos 3.1, Mg 1.8,   01-30 @ 18:38: Na 140, BUN 14, Cr 0.55, <H>, K+ 3.9, Phos 2.7, Mg 2.0,     A1C with Estimated Average Glucose Result: 10.7 % (01-25-22 @ 10:00)    Finger Sticks:  POCT Blood Glucose.: 178 mg/dL (01-31 @ 13:06)  POCT Blood Glucose.: 173 mg/dL (01-31 @ 11:05)  POCT Blood Glucose.: 145 mg/dL (01-31 @ 10:05)  POCT Blood Glucose.: 169 mg/dL (01-31 @ 09:03)  POCT Blood Glucose.: 165 mg/dL (01-31 @ 07:59)  POCT Blood Glucose.: 149 mg/dL (01-31 @ 07:01)  POCT Blood Glucose.: 127 mg/dL (01-31 @ 06:15)  POCT Blood Glucose.: 125 mg/dL (01-31 @ 05:14)  POCT Blood Glucose.: 116 mg/dL (01-31 @ 04:11)  POCT Blood Glucose.: 112 mg/dL (01-31 @ 03:05)  POCT Blood Glucose.: 107 mg/dL (01-31 @ 02:02)  POCT Blood Glucose.: 109 mg/dL (01-31 @ 01:07)  POCT Blood Glucose.: 121 mg/dL (01-31 @ 00:07)  POCT Blood Glucose.: 145 mg/dL (01-30 @ 23:27)  POCT Blood Glucose.: 163 mg/dL (01-30 @ 22:21)  POCT Blood Glucose.: 166 mg/dL (01-30 @ 21:24)  POCT Blood Glucose.: 165 mg/dL (01-30 @ 19:57)  POCT Blood Glucose.: 173 mg/dL (01-30 @ 18:56)  POCT Blood Glucose.: 160 mg/dL (01-30 @ 18:50)  POCT Blood Glucose.: 126 mg/dL (01-30 @ 18:08)  POCT Blood Glucose.: 137 mg/dL (01-30 @ 17:02)  POCT Blood Glucose.: 142 mg/dL (01-30 @ 16:01)  POCT Blood Glucose.: 113 mg/dL (01-30 @ 14:55)  POCT Blood Glucose.: 123 mg/dL (01-30 @ 14:12)    Skin per nursing documentation: no pressure injuries documented  Edema: no edema documented     Estimated Nutrition Needs: based on dosing wt 78 kg  Energy Needs: 5062-2894 kcal (25-30 kcal/kg)  Protein Needs:  grams (1.2-1.4 gm/kg)    Previous Nutrition Diagnosis: 1) Altered Nutrition Related Lab Values 2) Increased Nutrient Needs  Nutrition Diagnosis is: [X] ongoing; addressed with carbohydrate controlled enteral formula    New Nutrition Diagnosis: Dysphagia related to brain mass, s/p Left frontal craniotomy and removal of tumor 1/27/22, as evidenced by Speech and Swallow evaluation of sandoval-pharyngeal dysphagia and NPO order    Nutrition Care Plan:  [X] In Progress  [] Achieved  [] Not applicable    Nutrition Interventions:     Education Provided:       [] Yes:  [X] No: n/a       Recommendations:      1. Change formula to Glucerna1.5, advance to goal as tolerated: Glucerna1.5 @ 55 ml/hr x 24 hrs to provide 1320ml formula, 1980 kcal (25 kcal/kg), 109 gm protein (1.4 gm/kg), 1002ml free water, based on dosing wt 78kg  2. Monitor future swallow evaluations      Monitoring and Evaluation:   Continue to monitor nutritional intake, tolerance to diet prescription, weights, labs, skin integrity      RD remains available upon request and will follow up per protocol  Ronda Pang, MS RD CDN  [facility] (Pager #[phone])

## 2022-01-31 NOTE — SWALLOW BEDSIDE ASSESSMENT ADULT - ORAL PREPARATORY PHASE
Minimal oral manipulation. Absent A-P transfer and absent pharyngeal swallow trigger. Clinician suctioned PO from oral cavity. Further trials deferred./Within functional limits

## 2022-01-31 NOTE — PROGRESS NOTE ADULT - ASSESSMENT
66M s/p L crani for GBM w/ gleolan, now extubated doing well postop, on high dose dex requiring insulin drip.   - Repeat CTH stable, poss small L frontal residual neoplasm, will fu imaging as outpt  - fu final path  - need to xition off insulin gtt, will d/w team in AM to taper dex; can xfer to floor when off gtt  - SQL  - vimpat for AEDs  - Recd for AR, needs PMNR  - Na 140 on 2%  - pending path will need neuro onc fu

## 2022-02-01 LAB
ANION GAP SERPL CALC-SCNC: 12 MMOL/L — SIGNIFICANT CHANGE UP (ref 5–17)
ANION GAP SERPL CALC-SCNC: 13 MMOL/L — SIGNIFICANT CHANGE UP (ref 5–17)
BUN SERPL-MCNC: 18 MG/DL — SIGNIFICANT CHANGE UP (ref 7–23)
BUN SERPL-MCNC: 18 MG/DL — SIGNIFICANT CHANGE UP (ref 7–23)
CALCIUM SERPL-MCNC: 8.8 MG/DL — SIGNIFICANT CHANGE UP (ref 8.4–10.5)
CALCIUM SERPL-MCNC: 9 MG/DL — SIGNIFICANT CHANGE UP (ref 8.4–10.5)
CHLORIDE SERPL-SCNC: 103 MMOL/L — SIGNIFICANT CHANGE UP (ref 96–108)
CHLORIDE SERPL-SCNC: 105 MMOL/L — SIGNIFICANT CHANGE UP (ref 96–108)
CO2 SERPL-SCNC: 21 MMOL/L — LOW (ref 22–31)
CO2 SERPL-SCNC: 23 MMOL/L — SIGNIFICANT CHANGE UP (ref 22–31)
CREAT SERPL-MCNC: 0.6 MG/DL — SIGNIFICANT CHANGE UP (ref 0.5–1.3)
CREAT SERPL-MCNC: 0.62 MG/DL — SIGNIFICANT CHANGE UP (ref 0.5–1.3)
GLUCOSE BLDC GLUCOMTR-MCNC: 110 MG/DL — HIGH (ref 70–99)
GLUCOSE BLDC GLUCOMTR-MCNC: 111 MG/DL — HIGH (ref 70–99)
GLUCOSE BLDC GLUCOMTR-MCNC: 131 MG/DL — HIGH (ref 70–99)
GLUCOSE BLDC GLUCOMTR-MCNC: 192 MG/DL — HIGH (ref 70–99)
GLUCOSE BLDC GLUCOMTR-MCNC: 204 MG/DL — HIGH (ref 70–99)
GLUCOSE BLDC GLUCOMTR-MCNC: 61 MG/DL — LOW (ref 70–99)
GLUCOSE BLDC GLUCOMTR-MCNC: 75 MG/DL — SIGNIFICANT CHANGE UP (ref 70–99)
GLUCOSE SERPL-MCNC: 193 MG/DL — HIGH (ref 70–99)
GLUCOSE SERPL-MCNC: 71 MG/DL — SIGNIFICANT CHANGE UP (ref 70–99)
HCT VFR BLD CALC: 31.8 % — LOW (ref 39–50)
HGB BLD-MCNC: 10.5 G/DL — LOW (ref 13–17)
MAGNESIUM SERPL-MCNC: 1.9 MG/DL — SIGNIFICANT CHANGE UP (ref 1.6–2.6)
MCHC RBC-ENTMCNC: 31 PG — SIGNIFICANT CHANGE UP (ref 27–34)
MCHC RBC-ENTMCNC: 33 GM/DL — SIGNIFICANT CHANGE UP (ref 32–36)
MCV RBC AUTO: 93.8 FL — SIGNIFICANT CHANGE UP (ref 80–100)
NRBC # BLD: 0 /100 WBCS — SIGNIFICANT CHANGE UP (ref 0–0)
PHOSPHATE SERPL-MCNC: 3.2 MG/DL — SIGNIFICANT CHANGE UP (ref 2.5–4.5)
PLATELET # BLD AUTO: 116 K/UL — LOW (ref 150–400)
POTASSIUM SERPL-MCNC: 3.4 MMOL/L — LOW (ref 3.5–5.3)
POTASSIUM SERPL-MCNC: 4 MMOL/L — SIGNIFICANT CHANGE UP (ref 3.5–5.3)
POTASSIUM SERPL-SCNC: 3.4 MMOL/L — LOW (ref 3.5–5.3)
POTASSIUM SERPL-SCNC: 4 MMOL/L — SIGNIFICANT CHANGE UP (ref 3.5–5.3)
RBC # BLD: 3.39 M/UL — LOW (ref 4.2–5.8)
RBC # FLD: 11.7 % — SIGNIFICANT CHANGE UP (ref 10.3–14.5)
SODIUM SERPL-SCNC: 138 MMOL/L — SIGNIFICANT CHANGE UP (ref 135–145)
SODIUM SERPL-SCNC: 139 MMOL/L — SIGNIFICANT CHANGE UP (ref 135–145)
WBC # BLD: 7.07 K/UL — SIGNIFICANT CHANGE UP (ref 3.8–10.5)
WBC # FLD AUTO: 7.07 K/UL — SIGNIFICANT CHANGE UP (ref 3.8–10.5)

## 2022-02-01 PROCEDURE — 71045 X-RAY EXAM CHEST 1 VIEW: CPT | Mod: 26

## 2022-02-01 PROCEDURE — 99233 SBSQ HOSP IP/OBS HIGH 50: CPT

## 2022-02-01 PROCEDURE — 71045 X-RAY EXAM CHEST 1 VIEW: CPT | Mod: 26,77

## 2022-02-01 PROCEDURE — 43752 NASAL/OROGASTRIC W/TUBE PLMT: CPT

## 2022-02-01 RX ORDER — DEXTROSE 50 % IN WATER 50 %
12.5 SYRINGE (ML) INTRAVENOUS ONCE
Refills: 0 | Status: COMPLETED | OUTPATIENT
Start: 2022-02-01 | End: 2022-02-01

## 2022-02-01 RX ORDER — SODIUM CHLORIDE 9 MG/ML
1000 INJECTION, SOLUTION INTRAVENOUS
Refills: 0 | Status: DISCONTINUED | OUTPATIENT
Start: 2022-02-01 | End: 2022-02-02

## 2022-02-01 RX ORDER — DEXAMETHASONE 0.5 MG/5ML
3 ELIXIR ORAL EVERY 8 HOURS
Refills: 0 | Status: DISCONTINUED | OUTPATIENT
Start: 2022-02-01 | End: 2022-02-04

## 2022-02-01 RX ORDER — HALOPERIDOL DECANOATE 100 MG/ML
2 INJECTION INTRAMUSCULAR EVERY 6 HOURS
Refills: 0 | Status: DISCONTINUED | OUTPATIENT
Start: 2022-02-01 | End: 2022-02-03

## 2022-02-01 RX ADMIN — Medication 3 MILLIGRAM(S): at 14:05

## 2022-02-01 RX ADMIN — ENOXAPARIN SODIUM 40 MILLIGRAM(S): 100 INJECTION SUBCUTANEOUS at 17:49

## 2022-02-01 RX ADMIN — SODIUM CHLORIDE 50 MILLILITER(S): 5 INJECTION, SOLUTION INTRAVENOUS at 07:26

## 2022-02-01 RX ADMIN — LACOSAMIDE 100 MILLIGRAM(S): 50 TABLET ORAL at 21:19

## 2022-02-01 RX ADMIN — SODIUM CHLORIDE 3 GRAM(S): 9 INJECTION INTRAMUSCULAR; INTRAVENOUS; SUBCUTANEOUS at 06:13

## 2022-02-01 RX ADMIN — SENNA PLUS 2 TABLET(S): 8.6 TABLET ORAL at 21:19

## 2022-02-01 RX ADMIN — SODIUM CHLORIDE 3 GRAM(S): 9 INJECTION INTRAMUSCULAR; INTRAVENOUS; SUBCUTANEOUS at 23:13

## 2022-02-01 RX ADMIN — ATORVASTATIN CALCIUM 20 MILLIGRAM(S): 80 TABLET, FILM COATED ORAL at 03:11

## 2022-02-01 RX ADMIN — HUMAN INSULIN 10 UNIT(S): 100 INJECTION, SUSPENSION SUBCUTANEOUS at 06:14

## 2022-02-01 RX ADMIN — Medication 3 MILLIGRAM(S): at 21:20

## 2022-02-01 RX ADMIN — LISINOPRIL 10 MILLIGRAM(S): 2.5 TABLET ORAL at 06:13

## 2022-02-01 RX ADMIN — HUMAN INSULIN 10 UNIT(S): 100 INJECTION, SUSPENSION SUBCUTANEOUS at 11:15

## 2022-02-01 RX ADMIN — HUMAN INSULIN 10 UNIT(S): 100 INJECTION, SUSPENSION SUBCUTANEOUS at 00:30

## 2022-02-01 RX ADMIN — HUMAN INSULIN 10 UNIT(S): 100 INJECTION, SUSPENSION SUBCUTANEOUS at 18:19

## 2022-02-01 RX ADMIN — POLYETHYLENE GLYCOL 3350 17 GRAM(S): 17 POWDER, FOR SOLUTION ORAL at 06:14

## 2022-02-01 RX ADMIN — Medication 12.5 GRAM(S): at 21:19

## 2022-02-01 RX ADMIN — HUMAN INSULIN 10 UNIT(S): 100 INJECTION, SUSPENSION SUBCUTANEOUS at 23:11

## 2022-02-01 RX ADMIN — Medication 2: at 06:14

## 2022-02-01 RX ADMIN — LACOSAMIDE 100 MILLIGRAM(S): 50 TABLET ORAL at 06:13

## 2022-02-01 RX ADMIN — HALOPERIDOL DECANOATE 2 MILLIGRAM(S): 100 INJECTION INTRAMUSCULAR at 17:21

## 2022-02-01 RX ADMIN — ATORVASTATIN CALCIUM 20 MILLIGRAM(S): 80 TABLET, FILM COATED ORAL at 21:20

## 2022-02-01 RX ADMIN — PANTOPRAZOLE SODIUM 40 MILLIGRAM(S): 20 TABLET, DELAYED RELEASE ORAL at 21:19

## 2022-02-01 RX ADMIN — Medication 4: at 00:30

## 2022-02-01 RX ADMIN — Medication 3 MILLIGRAM(S): at 06:14

## 2022-02-01 RX ADMIN — SODIUM CHLORIDE 3 GRAM(S): 9 INJECTION INTRAMUSCULAR; INTRAVENOUS; SUBCUTANEOUS at 12:29

## 2022-02-01 NOTE — PROGRESS NOTE ADULT - SUBJECTIVE AND OBJECTIVE BOX
SUMMARY:   66 year-old man with HTN and DMII admitted 1/25/22 for left frontal enhancing mass found on work-up of confusion for which he underwent 5-ALA assisted resection on 1/27/22. Post-operatively, he had a clinical seizure, treated with lorazepam and levetiracetam load. EEG revealed no seizures. He was extubated 1/30/22. He had hyperglycemia , initially treated with insulin gtt, then transitioned to NPH.     24 HOUR EVENTS:   Pulled out NGT, replaced.     VITALS/DATA/ORDERS: [x] Reviewed    PHYSICAL EXAM:  General: Calm in bed, but at times restless  CVS: Regular  Pulm: Clear   GI: Soft  Ext: No edema  Neuro: Awake, alert, oriented to self and hospital, follows, RUE drift, right side 4+/5, left side full strength

## 2022-02-01 NOTE — PROGRESS NOTE ADULT - ASSESSMENT
ASSESSMENT/PLAN:    66M presenting with brain mass with significant vasogenic edema with brain compression and midline shift     NEURO:  - neurochecks q4h  - dex 3q6h for vasogenic edema and HTS per below  - Vimpat decrease 100mg BID for sz ppx  - CTH and MRI done, small bleed at the tumor bed  - pain control  -follow up pathology     CVS:  - SBP goal 100-160  - lisinopril 10mg     PULM:  extubated 1/30 now on RA     RENAL:  - Fluids: HTS 2%@100 decrease to 50cc/hr   - na goal 140-150  - daily IOs  - BMP q8h    GI:  - Diet: started tube feeds   -S&S to follow   - GI prophylaxis: Protonix  - Bowel regimen: miralax, senna    ENDO:   - FS goal 120-180  -Hyperglycemia   - insulin drip at 2U/hr then bridge to SC insulin today      HEME/ONC:  - SCDs  - Chemoppx: Lovenox at 40    ID:  - monitor for fevers      Patient is at high risk of neurologic deterioration/death due to:  cerebral edema, brain compression, seizures   ASSESSMENT/PLAN:    66M presenting with brain mass with significant vasogenic edema with brain compression and midline shift     NEURO:  - neurochecks q4h  - dex 3q8h for vasogenic edema and HTS per below  - Vimpat decrease 100mg BID  - CTH and MRI done, small bleed at the tumor bed  - pain control  -follow up pathology     CVS:  - SBP goal 100-160  - lisinopril 10mg     PULM:  extubated 1/30 now on RA     RENAL:  - Fluids: HTS 2%@100 decrease to 50cc/hr   - na goal 140-150  - daily IOs  - BMP q12h    GI:  - Diet: tube feeds   -S&S to follow   - GI prophylaxis: Protonix  - Bowel regimen: miralax, senna    ENDO:   - FS goal 120-180  -Hyperglycemia   - NPH 10U q8; HISS       HEME/ONC:  - SCDs  - Chemoppx: Lovenox at 40    ID:  - monitor for fevers      stable for transfer to [facility]

## 2022-02-01 NOTE — PROGRESS NOTE ADULT - ASSESSMENT
ASSESSMENT/PLAN: Brain tumor with cerebral edema    NEURO:  - Steroids taper  - Vimpat for seizure ppx  - Pain control  - Delirium precautions    CVS:  - SBP goal 100-160mmHg  - Continue enteral BP meds    PULM:  - Aspiration precautions    RENAL:  - Fluids: hypertonic saline, wean as tolerated  - na goal 140-150    GI:  - Diet: tube feeds  - Bowel regimen: Miralax, senna    ENDO:   - FS goal 120-180  - NPH, adjust as needed    HEME/ONC:  - SCDs, chemoppx     ID:  - Monitor for fevers

## 2022-02-01 NOTE — PROGRESS NOTE ADULT - SUBJECTIVE AND OBJECTIVE BOX
HPI:  66M hx HTN/DM was [place] early January, was confused and not answering appropriately, MRI Jan 7 showed L frontal enhancing mass with significant edema. Repeat CTH showed significant vasogenic edema and 1.5cm MLS  - POD3 L crani for GBM with gleolan    OVERNIGHT EVENTS:   No acute events overnight.    IMAGING:   Recent imaging studies were reviewed.    PHYSICAL EXAM:  General: intubated  CVS: RRR  Pulm: CTAB  GI: Soft, NTND  Extremities: No LE Edema  Neuro: EO spont, Aox1, attends to voice, Follows simple commands, pupils 3mm reactive, RUE & RLE 4+/5 w/ drift, LUE& LLE 5/5             ICU Vital Signs Last 24 Hrs  T(C): 37 (31 Jan 2022 19:00), Max: 37 (31 Jan 2022 11:00)  T(F): 98.6 (31 Jan 2022 19:00), Max: 98.6 (31 Jan 2022 11:00)  HR: 52 (01 Feb 2022 03:00) (52 - 81)  BP: --  BP(mean): --  ABP: 147/60 (01 Feb 2022 03:00) (129/55 - 160/71)  ABP(mean): 91 (01 Feb 2022 03:00) (79 - 106)  RR: 15 (01 Feb 2022 03:00) (13 - 22)  SpO2: 97% (01 Feb 2022 03:00) (95% - 98%)      01-30-22 @ 07:01  -  01-31-22 @ 07:00  --------------------------------------------------------  IN: 3106.5 mL / OUT: 3550 mL / NET: -443.5 mL    01-31-22 @ 07:01  -  02-01-22 @ 06:30  --------------------------------------------------------  IN: 1775 mL / OUT: 2800 mL / NET: -1025 mL            acetaminophen     Tablet .. 650 milliGRAM(s) Oral every 6 hours PRN  atorvastatin 20 milliGRAM(s) Oral at bedtime  bisacodyl 5 milliGRAM(s) Oral daily PRN  chlorhexidine 4% Liquid 1 Application(s) Topical daily  dexAMETHasone  Injectable 3 milliGRAM(s) IV Push every 6 hours  dextrose 40% Gel 15 Gram(s) Oral once  dextrose 5%. 1000 milliLiter(s) (50 mL/Hr) IV Continuous <Continuous>  dextrose 5%. 1000 milliLiter(s) (100 mL/Hr) IV Continuous <Continuous>  dextrose 50% Injectable 50 milliLiter(s) IV Push every 15 minutes  dextrose 50% Injectable 25 milliLiter(s) IV Push every 15 minutes  dextrose 50% Injectable 12.5 Gram(s) IV Push once  dextrose 50% Injectable 25 Gram(s) IV Push once  enoxaparin Injectable 40 milliGRAM(s) SubCutaneous <User Schedule>  glucagon  Injectable 1 milliGRAM(s) IntraMuscular once  insulin lispro (ADMELOG) corrective regimen sliding scale.   SubCutaneous every 6 hours  insulin NPH human recombinant 10 Unit(s) SubCutaneous every 6 hours  lacosamide Solution 100 milliGRAM(s) Oral two times a day  lisinopril 10 milliGRAM(s) Oral daily  pantoprazole  Injectable 40 milliGRAM(s) IV Push at bedtime  polyethylene glycol 3350 17 Gram(s) Oral every 12 hours  senna 2 Tablet(s) Oral at bedtime  sodium chloride 3 Gram(s) Oral every 6 hours  sodium chloride 2% . 1000 milliLiter(s) (50 mL/Hr) IV Continuous <Continuous>      LABS:  Na: 140 (01-31 @ 18:33), 141 (01-31 @ 10:12), 140 (01-31 @ 02:58), 140 (01-30 @ 18:38), 139 (01-30 @ 10:32), 141 (01-30 @ 02:50), 137 (01-29 @ 18:56), 137 (01-29 @ 10:37)  K: 3.8 (01-31 @ 18:33), 3.9 (01-31 @ 10:12), 3.6 (01-31 @ 02:58), 3.9 (01-30 @ 18:38), 3.8 (01-30 @ 10:32), 4.2 (01-30 @ 02:50), 4.0 (01-29 @ 18:56), 4.0 (01-29 @ 10:37)  Cl: 108 (01-31 @ 18:33), 111 (01-31 @ 10:12), 110 (01-31 @ 02:58), 109 (01-30 @ 18:38), 106 (01-30 @ 10:32), 109 (01-30 @ 02:50), 104 (01-29 @ 18:56), 103 (01-29 @ 10:37)  CO2: 23 (01-31 @ 18:33), 22 (01-31 @ 10:12), 22 (01-31 @ 02:58), 23 (01-30 @ 18:38), 24 (01-30 @ 10:32), 22 (01-30 @ 02:50), 20 (01-29 @ 18:56), 22 (01-29 @ 10:37)  BUN: 19 (01-31 @ 18:33), 16 (01-31 @ 10:12), 15 (01-31 @ 02:58), 14 (01-30 @ 18:38), 14 (01-30 @ 10:32), 15 (01-30 @ 02:50), 16 (01-29 @ 18:56), 12 (01-29 @ 10:37)  Cr: 0.57 (01-31 @ 18:33), 0.57 (01-31 @ 10:12), 0.55 (01-31 @ 02:58), 0.55 (01-30 @ 18:38), 0.52 (01-30 @ 10:32), 0.52 (01-30 @ 02:50), 0.54 (01-29 @ 18:56), 0.54 (01-29 @ 10:37)  Glu: 255(01-31 @ 18:33), 173(01-31 @ 10:12), 105(01-31 @ 02:58), 174(01-30 @ 18:38), 121(01-30 @ 10:32), 134(01-30 @ 02:50), 245(01-29 @ 18:56), 219(01-29 @ 10:37)    Hgb: 10.5 (02-01 @ 05:49), 10.1 (01-31 @ 02:58), 10.6 (01-30 @ 10:39)  Hct: 31.8 (02-01 @ 05:49), 31.0 (01-31 @ 02:58), 32.3 (01-30 @ 10:39)  WBC: 7.07 (02-01 @ 05:49), 9.64 (01-31 @ 02:58), 12.07 (01-30 @ 10:39)  Plt: 116 (02-01 @ 05:49), 114 (01-31 @ 02:58), 98 (01-30 @ 10:39)      ABG - ( 30 Jan 2022 10:41 )  pH, Arterial: 7.48  pH, Blood: x     /  pCO2: 36    /  pO2: 150   / HCO3: 27    / Base Excess: 3.3   /  SaO2: 99.7                 HPI:  66M hx HTN/DM [place] early January, was confused and not answering appropriately, MRI Jan 7 showed L frontal enhancing mass with significant edema. Repeat CTH showed significant vasogenic edema and 1.5cm MLS  - POD3 L crani for GBM with gleolan    OVERNIGHT EVENTS:   No acute events overnight.    IMAGING:   Recent imaging studies were reviewed.    PHYSICAL EXAM:  General: intubated  CVS: RRR  Pulm: CTAB  GI: Soft, NTND  Extremities: No LE Edema  Neuro: EO spont, Aox3, attends to voice, Follows simple commands, pupils 3mm reactive, RUE & RLE 4+/5 w/ drift, LUE& LLE 5/5       ICU Vital Signs Last 24 Hrs  T(C): 37 (31 Jan 2022 19:00), Max: 37 (31 Jan 2022 11:00)  T(F): 98.6 (31 Jan 2022 19:00), Max: 98.6 (31 Jan 2022 11:00)  HR: 52 (01 Feb 2022 03:00) (52 - 81)  ABP: 147/60 (01 Feb 2022 03:00) (129/55 - 160/71)  ABP(mean): 91 (01 Feb 2022 03:00) (79 - 106)  RR: 15 (01 Feb 2022 03:00) (13 - 22)  SpO2: 97% (01 Feb 2022 03:00) (95% - 98%)      01-30-22 @ 07:01  -  01-31-22 @ 07:00  --------------------------------------------------------  IN: 3106.5 mL / OUT: 3550 mL / NET: -443.5 mL    01-31-22 @ 07:01  -  02-01-22 @ 06:30  --------------------------------------------------------  IN: 1775 mL / OUT: 2800 mL / NET: -1025 mL            acetaminophen     Tablet .. 650 milliGRAM(s) Oral every 6 hours PRN  atorvastatin 20 milliGRAM(s) Oral at bedtime  bisacodyl 5 milliGRAM(s) Oral daily PRN  chlorhexidine 4% Liquid 1 Application(s) Topical daily  dexAMETHasone  Injectable 3 milliGRAM(s) IV Push every 6 hours  dextrose 40% Gel 15 Gram(s) Oral once  dextrose 5%. 1000 milliLiter(s) (50 mL/Hr) IV Continuous <Continuous>  dextrose 5%. 1000 milliLiter(s) (100 mL/Hr) IV Continuous <Continuous>  dextrose 50% Injectable 50 milliLiter(s) IV Push every 15 minutes  dextrose 50% Injectable 25 milliLiter(s) IV Push every 15 minutes  dextrose 50% Injectable 12.5 Gram(s) IV Push once  dextrose 50% Injectable 25 Gram(s) IV Push once  enoxaparin Injectable 40 milliGRAM(s) SubCutaneous <User Schedule>  glucagon  Injectable 1 milliGRAM(s) IntraMuscular once  insulin lispro (ADMELOG) corrective regimen sliding scale.   SubCutaneous every 6 hours  insulin NPH human recombinant 10 Unit(s) SubCutaneous every 6 hours  lacosamide Solution 100 milliGRAM(s) Oral two times a day  lisinopril 10 milliGRAM(s) Oral daily  pantoprazole  Injectable 40 milliGRAM(s) IV Push at bedtime  polyethylene glycol 3350 17 Gram(s) Oral every 12 hours  senna 2 Tablet(s) Oral at bedtime  sodium chloride 3 Gram(s) Oral every 6 hours  sodium chloride 2% . 1000 milliLiter(s) (50 mL/Hr) IV Continuous <Continuous>      LABS:  Na: 140 (01-31 @ 18:33), 141 (01-31 @ 10:12), 140 (01-31 @ 02:58), 140 (01-30 @ 18:38), 139 (01-30 @ 10:32), 141 (01-30 @ 02:50), 137 (01-29 @ 18:56), 137 (01-29 @ 10:37)  K: 3.8 (01-31 @ 18:33), 3.9 (01-31 @ 10:12), 3.6 (01-31 @ 02:58), 3.9 (01-30 @ 18:38), 3.8 (01-30 @ 10:32), 4.2 (01-30 @ 02:50), 4.0 (01-29 @ 18:56), 4.0 (01-29 @ 10:37)  Cl: 108 (01-31 @ 18:33), 111 (01-31 @ 10:12), 110 (01-31 @ 02:58), 109 (01-30 @ 18:38), 106 (01-30 @ 10:32), 109 (01-30 @ 02:50), 104 (01-29 @ 18:56), 103 (01-29 @ 10:37)  CO2: 23 (01-31 @ 18:33), 22 (01-31 @ 10:12), 22 (01-31 @ 02:58), 23 (01-30 @ 18:38), 24 (01-30 @ 10:32), 22 (01-30 @ 02:50), 20 (01-29 @ 18:56), 22 (01-29 @ 10:37)  BUN: 19 (01-31 @ 18:33), 16 (01-31 @ 10:12), 15 (01-31 @ 02:58), 14 (01-30 @ 18:38), 14 (01-30 @ 10:32), 15 (01-30 @ 02:50), 16 (01-29 @ 18:56), 12 (01-29 @ 10:37)  Cr: 0.57 (01-31 @ 18:33), 0.57 (01-31 @ 10:12), 0.55 (01-31 @ 02:58), 0.55 (01-30 @ 18:38), 0.52 (01-30 @ 10:32), 0.52 (01-30 @ 02:50), 0.54 (01-29 @ 18:56), 0.54 (01-29 @ 10:37)  Glu: 255(01-31 @ 18:33), 173(01-31 @ 10:12), 105(01-31 @ 02:58), 174(01-30 @ 18:38), 121(01-30 @ 10:32), 134(01-30 @ 02:50), 245(01-29 @ 18:56), 219(01-29 @ 10:37)    Hgb: 10.5 (02-01 @ 05:49), 10.1 (01-31 @ 02:58), 10.6 (01-30 @ 10:39)  Hct: 31.8 (02-01 @ 05:49), 31.0 (01-31 @ 02:58), 32.3 (01-30 @ 10:39)  WBC: 7.07 (02-01 @ 05:49), 9.64 (01-31 @ 02:58), 12.07 (01-30 @ 10:39)  Plt: 116 (02-01 @ 05:49), 114 (01-31 @ 02:58), 98 (01-30 @ 10:39)      ABG - ( 30 Jan 2022 10:41 )  pH, Arterial: 7.48  pH, Blood: x     /  pCO2: 36    /  pO2: 150   / HCO3: 27    / Base Excess: 3.3   /  SaO2: 99.7

## 2022-02-02 DIAGNOSIS — D69.6 THROMBOCYTOPENIA, UNSPECIFIED: ICD-10-CM

## 2022-02-02 DIAGNOSIS — E78.5 HYPERLIPIDEMIA, UNSPECIFIED: ICD-10-CM

## 2022-02-02 DIAGNOSIS — Z29.9 ENCOUNTER FOR PROPHYLACTIC MEASURES, UNSPECIFIED: ICD-10-CM

## 2022-02-02 DIAGNOSIS — C71.9 MALIGNANT NEOPLASM OF BRAIN, UNSPECIFIED: ICD-10-CM

## 2022-02-02 DIAGNOSIS — I10 ESSENTIAL (PRIMARY) HYPERTENSION: ICD-10-CM

## 2022-02-02 DIAGNOSIS — E11.9 TYPE 2 DIABETES MELLITUS WITHOUT COMPLICATIONS: ICD-10-CM

## 2022-02-02 DIAGNOSIS — D49.6 NEOPLASM OF UNSPECIFIED BEHAVIOR OF BRAIN: ICD-10-CM

## 2022-02-02 PROBLEM — Z00.00 ENCOUNTER FOR PREVENTIVE HEALTH EXAMINATION: Status: ACTIVE | Noted: 2022-02-02

## 2022-02-02 LAB
ANION GAP SERPL CALC-SCNC: 12 MMOL/L — SIGNIFICANT CHANGE UP (ref 5–17)
BUN SERPL-MCNC: 18 MG/DL — SIGNIFICANT CHANGE UP (ref 7–23)
CALCIUM SERPL-MCNC: 8.7 MG/DL — SIGNIFICANT CHANGE UP (ref 8.4–10.5)
CHLORIDE SERPL-SCNC: 99 MMOL/L — SIGNIFICANT CHANGE UP (ref 96–108)
CO2 SERPL-SCNC: 22 MMOL/L — SIGNIFICANT CHANGE UP (ref 22–31)
CREAT SERPL-MCNC: 0.6 MG/DL — SIGNIFICANT CHANGE UP (ref 0.5–1.3)
GLUCOSE BLDC GLUCOMTR-MCNC: 101 MG/DL — HIGH (ref 70–99)
GLUCOSE BLDC GLUCOMTR-MCNC: 103 MG/DL — HIGH (ref 70–99)
GLUCOSE BLDC GLUCOMTR-MCNC: 219 MG/DL — HIGH (ref 70–99)
GLUCOSE BLDC GLUCOMTR-MCNC: 276 MG/DL — HIGH (ref 70–99)
GLUCOSE SERPL-MCNC: 218 MG/DL — HIGH (ref 70–99)
HCT VFR BLD CALC: 32.2 % — LOW (ref 39–50)
HGB BLD-MCNC: 11 G/DL — LOW (ref 13–17)
MCHC RBC-ENTMCNC: 31 PG — SIGNIFICANT CHANGE UP (ref 27–34)
MCHC RBC-ENTMCNC: 34.2 GM/DL — SIGNIFICANT CHANGE UP (ref 32–36)
MCV RBC AUTO: 90.7 FL — SIGNIFICANT CHANGE UP (ref 80–100)
NRBC # BLD: 0 /100 WBCS — SIGNIFICANT CHANGE UP (ref 0–0)
PLATELET # BLD AUTO: 129 K/UL — LOW (ref 150–400)
POTASSIUM SERPL-MCNC: 4 MMOL/L — SIGNIFICANT CHANGE UP (ref 3.5–5.3)
POTASSIUM SERPL-SCNC: 4 MMOL/L — SIGNIFICANT CHANGE UP (ref 3.5–5.3)
RBC # BLD: 3.55 M/UL — LOW (ref 4.2–5.8)
RBC # FLD: 11.5 % — SIGNIFICANT CHANGE UP (ref 10.3–14.5)
SODIUM SERPL-SCNC: 133 MMOL/L — LOW (ref 135–145)
WBC # BLD: 8.71 K/UL — SIGNIFICANT CHANGE UP (ref 3.8–10.5)
WBC # FLD AUTO: 8.71 K/UL — SIGNIFICANT CHANGE UP (ref 3.8–10.5)

## 2022-02-02 PROCEDURE — 99233 SBSQ HOSP IP/OBS HIGH 50: CPT

## 2022-02-02 PROCEDURE — 99223 1ST HOSP IP/OBS HIGH 75: CPT | Mod: GC

## 2022-02-02 RX ORDER — INSULIN LISPRO 100/ML
VIAL (ML) SUBCUTANEOUS
Refills: 0 | Status: DISCONTINUED | OUTPATIENT
Start: 2022-02-02 | End: 2022-02-02

## 2022-02-02 RX ORDER — INSULIN GLARGINE 100 [IU]/ML
15 INJECTION, SOLUTION SUBCUTANEOUS AT BEDTIME
Refills: 0 | Status: DISCONTINUED | OUTPATIENT
Start: 2022-02-02 | End: 2022-02-02

## 2022-02-02 RX ORDER — INSULIN GLARGINE 100 [IU]/ML
12 INJECTION, SOLUTION SUBCUTANEOUS AT BEDTIME
Refills: 0 | Status: DISCONTINUED | OUTPATIENT
Start: 2022-02-02 | End: 2022-02-03

## 2022-02-02 RX ORDER — INSULIN LISPRO 100/ML
12 VIAL (ML) SUBCUTANEOUS
Refills: 0 | Status: DISCONTINUED | OUTPATIENT
Start: 2022-02-02 | End: 2022-02-02

## 2022-02-02 RX ORDER — ACETAMINOPHEN 500 MG
650 TABLET ORAL ONCE
Refills: 0 | Status: DISCONTINUED | OUTPATIENT
Start: 2022-02-02 | End: 2022-02-02

## 2022-02-02 RX ORDER — INSULIN LISPRO 100/ML
VIAL (ML) SUBCUTANEOUS AT BEDTIME
Refills: 0 | Status: DISCONTINUED | OUTPATIENT
Start: 2022-02-02 | End: 2022-02-08

## 2022-02-02 RX ORDER — SODIUM CHLORIDE 9 MG/ML
1000 INJECTION INTRAMUSCULAR; INTRAVENOUS; SUBCUTANEOUS
Refills: 0 | Status: DISCONTINUED | OUTPATIENT
Start: 2022-02-02 | End: 2022-02-02

## 2022-02-02 RX ORDER — INSULIN LISPRO 100/ML
VIAL (ML) SUBCUTANEOUS
Refills: 0 | Status: DISCONTINUED | OUTPATIENT
Start: 2022-02-02 | End: 2022-02-08

## 2022-02-02 RX ORDER — INSULIN LISPRO 100/ML
8 VIAL (ML) SUBCUTANEOUS
Refills: 0 | Status: DISCONTINUED | OUTPATIENT
Start: 2022-02-02 | End: 2022-02-03

## 2022-02-02 RX ADMIN — Medication 6: at 11:55

## 2022-02-02 RX ADMIN — INSULIN GLARGINE 12 UNIT(S): 100 INJECTION, SOLUTION SUBCUTANEOUS at 23:11

## 2022-02-02 RX ADMIN — PANTOPRAZOLE SODIUM 40 MILLIGRAM(S): 20 TABLET, DELAYED RELEASE ORAL at 21:39

## 2022-02-02 RX ADMIN — ENOXAPARIN SODIUM 40 MILLIGRAM(S): 100 INJECTION SUBCUTANEOUS at 16:49

## 2022-02-02 RX ADMIN — ATORVASTATIN CALCIUM 20 MILLIGRAM(S): 80 TABLET, FILM COATED ORAL at 21:38

## 2022-02-02 RX ADMIN — POLYETHYLENE GLYCOL 3350 17 GRAM(S): 17 POWDER, FOR SOLUTION ORAL at 16:50

## 2022-02-02 RX ADMIN — Medication 4: at 05:35

## 2022-02-02 RX ADMIN — HUMAN INSULIN 10 UNIT(S): 100 INJECTION, SUSPENSION SUBCUTANEOUS at 05:36

## 2022-02-02 RX ADMIN — SODIUM CHLORIDE 3 GRAM(S): 9 INJECTION INTRAMUSCULAR; INTRAVENOUS; SUBCUTANEOUS at 16:50

## 2022-02-02 RX ADMIN — LISINOPRIL 10 MILLIGRAM(S): 2.5 TABLET ORAL at 05:35

## 2022-02-02 RX ADMIN — Medication 3 MILLIGRAM(S): at 14:19

## 2022-02-02 RX ADMIN — SENNA PLUS 2 TABLET(S): 8.6 TABLET ORAL at 21:39

## 2022-02-02 RX ADMIN — Medication 3 MILLIGRAM(S): at 05:36

## 2022-02-02 RX ADMIN — Medication 3 MILLIGRAM(S): at 21:38

## 2022-02-02 RX ADMIN — LACOSAMIDE 100 MILLIGRAM(S): 50 TABLET ORAL at 16:50

## 2022-02-02 RX ADMIN — LACOSAMIDE 100 MILLIGRAM(S): 50 TABLET ORAL at 05:35

## 2022-02-02 RX ADMIN — SODIUM CHLORIDE 3 GRAM(S): 9 INJECTION INTRAMUSCULAR; INTRAVENOUS; SUBCUTANEOUS at 05:35

## 2022-02-02 RX ADMIN — SODIUM CHLORIDE 3 GRAM(S): 9 INJECTION INTRAMUSCULAR; INTRAVENOUS; SUBCUTANEOUS at 11:03

## 2022-02-02 RX ADMIN — POLYETHYLENE GLYCOL 3350 17 GRAM(S): 17 POWDER, FOR SOLUTION ORAL at 06:00

## 2022-02-02 NOTE — CHART NOTE - NSCHARTNOTEFT_GEN_A_CORE
66M hx HTN/DM was in the [place] for vacation early January, was confused and not answering appropriately, MRI Jan 7 showed L frontal enhancing mass with significant edema. Repeat CTH showed significant vasogenic edema and 1.5cm MLS.   1/27: s/p L crani for GBM with gleolan. after OR b/l LE shaking concerning for seizure, CTH stable, given ativan, loaded with keppra and continued with vimpat  1/30: Extubated     Pt seen for initial bedside swallow evaluation on 1/31, with recommendations for NPO, with non-oral nutrition/hydration/medications. At that time, pt presented with a suspected oropharyngeal dysphagia, superimposed upon cognitive/linguistic deficits. No palpable pharyngeal swallow trigger with minimal/conservative PO trials, requiring clinician to suction PO from oral cavity. Not a candidate for a PO diet/ further PO trials at this time.    Pt seen for re-evaluation on this date. No longer requiring ICU level of care. Pt encountered in bed, awake, on room air. NGT in place, pt with b/l wrist restraints and posey vest. Pt Kazakh/ English speaking,  utilized (#[id number]). A&Ox2, to self and to place. Improvement in perseverative responses from initial evaluation. Able to follow directives for purpose of the assessment. Slightly low vocal volume. Oral cavity clear/ clean, sandoval-motor exam grossly unremarkable. Patient provided with PO trials of ice chips, thin puree, thick puree, solid textures, and thin liquids via cup/straw. Swallow function notable for prolonged mastication with harder solids; pharyngeal swallow judged to be timely. No overt signs of laryngeal penetration/aspiration observed. Pt denied difficulty chewing/swallowing. Pt left in NAD.    Impressions: Pt presents with improvement in mentation/ swallow function from prior assessment.     Recommendations:  1. Easy to chew diet with thin liquids   2. Monitor for s/s aspiration/laryngeal penetration. If noted:  D/C p.o. intake, provide non-oral nutrition/hydration/meds, and contact this service @ [id number]  3. D/c to acute rehab     Above discussed with Ismael HILL.    Vandana Saldana CCC-SLP pgr #[phone]

## 2022-02-02 NOTE — CONSULT NOTE ADULT - SUBJECTIVE AND OBJECTIVE BOX
Javier Bermeo MD, PGY-3  Available on [employer] Teams | Pager: [phone] | [facility]: [phone]    HPI:  66M who was in his usual state of health until earlier this month (Jan 2022) when he was noted to be progressively disoriented and confused. At that time, the patient was in the [place] where he completed an MRI showing a large enhancing left frontal mass with edema. He was subsequently brought back to [place], his place of residence and presented to the [facility] ER on Jan 24. On initial exam, the patient was only oriented to self, answering questions inappropriately and perseverating with a RUE pronator drive and no focal deficit. CTH in the ER confirmed a large left frontal lesion with significant vasogenic edema and midline shift. 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 Pt underwent gross total resection of the lesion on 1/27/2022.     PCP: Avery Juarez [phone]    PAST MEDICAL & SURGICAL HISTORY:  Diabetes mellitus  Hypertension  HLD    Review of Systems:   CONSTITUTIONAL: No fever, chills  HEENT: No sore throat, vision changes  RESPIRATORY: No cough, wheezing, shortness of breath  CARDIOVASCULAR: No chest pain, palpitations, leg edema  GASTROINTESTINAL: No abdominal pain, nausea, vomiting, diarrhea or constipation. No melena or hematochezia.  GENITOURINARY: No dysuria, frequency, hematuria  NEUROLOGICAL: No headaches, memory loss, loss of strength, numbness, or tremors  SKIN: No itching, burning, rashes, or lesions   MUSCULOSKELETAL: No joint pain or swelling; No muscle, back, or extremity pain  PSYCHIATRIC: No depression, anxiety  HEME/LYMPH: No easy bruising, or bleeding gums      Allergies  No Known Allergies  Intolerances    Social History:     FAMILY HISTORY:      MEDICATIONS  (STANDING):  atorvastatin 20 milliGRAM(s) Oral at bedtime  dexAMETHasone  Injectable 3 milliGRAM(s) IV Push every 8 hours  dextrose 40% Gel 15 Gram(s) Oral once  dextrose 5%. 1000 milliLiter(s) (50 mL/Hr) IV Continuous <Continuous>  dextrose 5%. 1000 milliLiter(s) (100 mL/Hr) IV Continuous <Continuous>  dextrose 50% Injectable 50 milliLiter(s) IV Push every 15 minutes  dextrose 50% Injectable 25 milliLiter(s) IV Push every 15 minutes  dextrose 50% Injectable 12.5 Gram(s) IV Push once  dextrose 50% Injectable 25 Gram(s) IV Push once  enoxaparin Injectable 40 milliGRAM(s) SubCutaneous <User Schedule>  glucagon  Injectable 1 milliGRAM(s) IntraMuscular once  insulin glargine Injectable (LANTUS) 15 Unit(s) SubCutaneous at bedtime  insulin lispro (ADMELOG) corrective regimen sliding scale.   SubCutaneous every 6 hours  insulin lispro Injectable (ADMELOG) 12 Unit(s) SubCutaneous three times a day before meals  lacosamide Solution 100 milliGRAM(s) Oral two times a day  lisinopril 10 milliGRAM(s) Oral daily  pantoprazole  Injectable 40 milliGRAM(s) IV Push at bedtime  polyethylene glycol 3350 17 Gram(s) Oral every 12 hours  senna 2 Tablet(s) Oral at bedtime  sodium chloride 3 Gram(s) Oral every 6 hours  sodium chloride 0.9%. 1000 milliLiter(s) (50 mL/Hr) IV Continuous <Continuous>    MEDICATIONS  (PRN):  acetaminophen     Tablet .. 650 milliGRAM(s) Oral every 6 hours PRN Temp greater or equal to 38C (100.4F), Mild Pain (1 - 3)  bisacodyl 5 milliGRAM(s) Oral daily PRN Constipation  haloperidol    Injectable 2 milliGRAM(s) IV Push every 6 hours PRN Agitation        CAPILLARY BLOOD GLUCOSE      POCT Blood Glucose.: 276 mg/dL (02 Feb 2022 11:51)  POCT Blood Glucose.: 219 mg/dL (02 Feb 2022 05:17)  POCT Blood Glucose.: 111 mg/dL (01 Feb 2022 23:10)  POCT Blood Glucose.: 131 mg/dL (01 Feb 2022 21:37)  POCT Blood Glucose.: 61 mg/dL (01 Feb 2022 20:55)  POCT Blood Glucose.: 75 mg/dL (01 Feb 2022 17:54)    I&O's Summary    01 Feb 2022 07:01  -  02 Feb 2022 07:00  --------------------------------------------------------  IN: 1820 mL / OUT: 950 mL / NET: 870 mL        Physical Exam:  Vital Signs Last 24 Hrs  T(C): 36.6 (02 Feb 2022 07:18), Max: 36.9 (01 Feb 2022 15:40)  T(F): 97.9 (02 Feb 2022 07:18), Max: 98.5 (01 Feb 2022 15:40)  HR: 53 (02 Feb 2022 07:18) (48 - 94)  BP: 151/83 (02 Feb 2022 07:18) (143/78 - 173/85)  BP(mean): --  RR: 18 (02 Feb 2022 07:18) (18 - 18)  SpO2: 97% (02 Feb 2022 07:18) (94% - 97%)    CONSTITUTIONAL: NAD, well-developed, well-groomed  NECK: Supple, no palpable masses; no thyromegaly  RESPIRATORY: Normal respiratory effort; lungs are clear to auscultation bilaterally  CARDIOVASCULAR: normal S1 and S2, no murmur/rub/gallop; No lower extremity edema  ABDOMEN: Nontender to palpation, normoactive bowel sounds, no rebound/guarding; No hepatosplenomegaly  MUSCULOSKELETAL:  no clubbing or cyanosis of digits; no joint swelling or tenderness to palpation  PSYCH: A+O to person, place, and time; affect appropriate  NEUROLOGY: CN 2-12 are intact and symmetric; no gross sensory deficits   SKIN: No rashes; no palpable lesions    LABS:                        11.0   8.71  )-----------( 129      ( 02 Feb 2022 06:11 )             32.2     02-02    133<L>  |  99  |  18  ----------------------------<  218<H>  4.0   |  22  |  0.60    Ca    8.7      02 Feb 2022 06:09  Phos  3.2     02-01  Mg     1.9     02-01      RADIOLOGY & ADDITIONAL TESTS:  Results Reviewed:   Imaging Personally Reviewed:  Electrocardiogram Personally Reviewed:    COORDINATION OF CARE:  Care Discussed with Consultants/Other Providers [Y/N]:  Prior or Outpatient Records Reviewed [Y/N]: Javier Bermeo MD, PGY-3  Available on [employer] Teams | Pager: [phone] | [facility]: [phone]    HPI:  HPI obtained from chart review due to patient's confusion. He is a 66M who was in his usual state of health until earlier this month (Jan 2022) when he was noted to be progressively disoriented and confused. At that time, the patient was in the [place] where he completed an MRI showing a large enhancing left frontal mass with edema. He was subsequently brought back to [place], his place of residence and presented to the [facility] ER on Jan 24. On initial exam, the patient was only oriented to self, answering questions inappropriately and perseverating with a RUE pronator drive and no focal deficit. CTH in the ER confirmed a large left frontal lesion with significant vasogenic edema and midline shift. 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 Pt underwent gross total resection of the lesion on 1/27/2022.     Pt appears confused today, not oriented. Answering some questions appropriately but some responses not appropriate (i.e. what year is it? Saturday). Pt states he feels good but ROS positive for headache.     PCP: Avery Juarez [phone]    PAST MEDICAL & SURGICAL HISTORY:  Diabetes mellitus  Hypertension  HLD    Review of Systems - limited by patient confusion.   CONSTITUTIONAL: No fever, chills  HEENT: No sore throat, vision changes  RESPIRATORY: No cough, difficulty breathing  CARDIOVASCULAR: No chest pain, palpitations, leg edema  GASTROINTESTINAL: No abdominal pain, nausea, vomiting, diarrhea or constipation.  GENITOURINARY: No dysuria, frequency, hematuria  NEUROLOGICAL: +headache, confusion, No weakness  SKIN: No rashes or lesions   MUSCULOSKELETAL: No joint pain or swelling; No muscle, back, or extremity pain  PSYCHIATRIC: No depression, anxiety  HEME/LYMPH: No easy bruising, or bleeding gums    Allergies  No Known Allergies  Intolerances    Social History: works as a , denies toxic habits x 3    FAMILY HISTORY: diabetes in mom?     Home Medications:  aspirin 81 mg oral delayed release tablet: 1 tab(s) orally once a day (02 Feb 2022 11:41)  atorvastatin 10 mg oral tablet: 1 tab(s) orally once a day (02 Feb 2022 11:41)  lisinopril 2.5 mg oral tablet: 1 tab(s) orally once a day (02 Feb 2022 11:41)  metFORMIN 1000 mg oral tablet: 1 tab(s) orally 2 times a day (02 Feb 2022 11:41)    MEDICATIONS  (STANDING):  atorvastatin 20 milliGRAM(s) Oral at bedtime  dexAMETHasone  Injectable 3 milliGRAM(s) IV Push every 8 hours  dextrose 40% Gel 15 Gram(s) Oral once  dextrose 5%. 1000 milliLiter(s) (50 mL/Hr) IV Continuous <Continuous>  dextrose 5%. 1000 milliLiter(s) (100 mL/Hr) IV Continuous <Continuous>  dextrose 50% Injectable 50 milliLiter(s) IV Push every 15 minutes  dextrose 50% Injectable 25 milliLiter(s) IV Push every 15 minutes  dextrose 50% Injectable 12.5 Gram(s) IV Push once  dextrose 50% Injectable 25 Gram(s) IV Push once  enoxaparin Injectable 40 milliGRAM(s) SubCutaneous <User Schedule>  glucagon  Injectable 1 milliGRAM(s) IntraMuscular once  insulin glargine Injectable (LANTUS) 15 Unit(s) SubCutaneous at bedtime  insulin lispro (ADMELOG) corrective regimen sliding scale.   SubCutaneous every 6 hours  insulin lispro Injectable (ADMELOG) 12 Unit(s) SubCutaneous three times a day before meals  lacosamide Solution 100 milliGRAM(s) Oral two times a day  lisinopril 10 milliGRAM(s) Oral daily  pantoprazole  Injectable 40 milliGRAM(s) IV Push at bedtime  polyethylene glycol 3350 17 Gram(s) Oral every 12 hours  senna 2 Tablet(s) Oral at bedtime  sodium chloride 3 Gram(s) Oral every 6 hours  sodium chloride 0.9%. 1000 milliLiter(s) (50 mL/Hr) IV Continuous <Continuous>    MEDICATIONS  (PRN):  acetaminophen     Tablet .. 650 milliGRAM(s) Oral every 6 hours PRN Temp greater or equal to 38C (100.4F), Mild Pain (1 - 3)  bisacodyl 5 milliGRAM(s) Oral daily PRN Constipation  haloperidol    Injectable 2 milliGRAM(s) IV Push every 6 hours PRN Agitation        CAPILLARY BLOOD GLUCOSE      POCT Blood Glucose.: 276 mg/dL (02 Feb 2022 11:51)  POCT Blood Glucose.: 219 mg/dL (02 Feb 2022 05:17)  POCT Blood Glucose.: 111 mg/dL (01 Feb 2022 23:10)  POCT Blood Glucose.: 131 mg/dL (01 Feb 2022 21:37)  POCT Blood Glucose.: 61 mg/dL (01 Feb 2022 20:55)  POCT Blood Glucose.: 75 mg/dL (01 Feb 2022 17:54)    I&O's Summary    01 Feb 2022 07:01  -  02 Feb 2022 07:00  --------------------------------------------------------  IN: 1820 mL / OUT: 950 mL / NET: 870 mL        Physical Exam:  Vital Signs Last 24 Hrs  T(C): 36.6 (02 Feb 2022 07:18), Max: 36.9 (01 Feb 2022 15:40)  T(F): 97.9 (02 Feb 2022 07:18), Max: 98.5 (01 Feb 2022 15:40)  HR: 53 (02 Feb 2022 07:18) (48 - 94)  BP: 151/83 (02 Feb 2022 07:18) (143/78 - 173/85)  RR: 18 (02 Feb 2022 07:18) (18 - 18)  SpO2: 97% (02 Feb 2022 07:18) (94% - 97%)    CONSTITUTIONAL: NAD, watching TV, comfortable, in wrist restraints  HEAD: staples across head, no sign of wound infection  EYES: EOMI, PERRL  NECK: Supple, no palpable masses; no thyromegaly  RESPIRATORY: CTABL/ no wheezing or respiratory distress  CARDIOVASCULAR: normal S1 and S2, no murmur/rub/gallop; No lower extremity edema  ABDOMEN: soft, nt, nd, normoactive bowel sounds  MUSCULOSKELETAL: no clubbing or cyanosis of digits; no joint swelling or tenderness to palpation  PSYCH: AOx1  NEUROLOGY: diffuse weakness, nonfocal  SKIN: No rashes; no palpable lesions    LABS:                        11.0   8.71  )-----------( 129      ( 02 Feb 2022 06:11 )             32.2     02-02    133<L>  |  99  |  18  ----------------------------<  218<H>  4.0   |  22  |  0.60    Ca    8.7      02 Feb 2022 06:09  Phos  3.2     02-01  Mg     1.9     02-01      RADIOLOGY & ADDITIONAL TESTS:  Results Reviewed:   < from: CT Head No Cont (01.30.22 @ 09:32) >  Postoperative changes compatible the high left frontal craniotomy is   again seen. Evaluation of the postop bed does demonstrate abnormal   low-attenuation though there are scattered areas of high attenuation   again seen which could be compatible with areas of hemorrhage at the   postop material. Extra-axial fluid/air is identified in the postoperative   region which measures approximately 1.6 cm in widest diameter. Overall   the amount of air has decreased when compared with the prior exam. This   finding previously measured approximately 1.3 cm in widest diameter. Area   of vasogenic edema around the postop region is again seen and unchanged.   Mass effect on the left lateral ventricle is again seen with   left-to-right shift at the C6 (9.6 mm).    < end of copied text >  < from: CT Head No Cont (01.29.22 @ 09:51) >  IMPRESSION:  -Interval development of acute hemorrhage within the left frontal   resection cavity measuring up to 2 cm.    -Interval slight decrease in rightward midline shift, now 1 cm.    -Other postsurgical changes as described above. Stable hypoattenuation   surrounding the resection cavity.     Findings discussed with SERGIO Cooney at 12:13 PM on 1/29/2022 by Dr. Ulloa    < end of copied text >  < from: CT Head No Cont (01.28.22 @ 10:03) >  IMPRESSION: Decrease in left frontal extra axial air, the rest of the   study appears relatively unchanged when allowing for differences in   technique.    < end of copied text >  < from: CT Head No Cont (01.27.22 @ 17:11) >  IMPRESSION:  No significant change from one hour prior. Postop changes in   the left frontal lobe. Mass effect, midline shift and subfalcine   herniation remain.    < end of copied text >  < from: CT Head No Cont (01.27.22 @ 15:40) >  IMPRESSION:  Left frontal craniotomy with postsurgical changes as described above.   Stable left to right midline shift. Persistent left subfalcine   herniation. Increased mass effect on the third ventricle which is now   effaced.    < end of copied text >  < from: CT Head No Cont (01.24.22 @ 19:35) >  IMPRESSION:  Extensive lesion and surrounding vasogenic edema in left frontal and   temporal lobes with involvement of the corpus callosum and medial left   frontal lobe with marked ventricular effacement and 1.4 cm left to right   midline shift.  Consider submitting prior exams for comparison.    < end of copied text >    Imaging Personally Reviewed:   Electrocardiogram Personally Reviewed: NSR with R axis deviation    COORDINATION OF CARE:  Care Discussed with Consultants/Other Providers [Y/N]:  Prior or Outpatient Records Reviewed [Y/N]: Javier Bemreo MD, PGY-3  Available on [employer] Teams | Pager: [phone] | [facility]: [phone]    HPI:  HPI obtained from chart review due to patient's confusion. He is a 66M who was in his usual state of health until earlier this month (Jan 2022) when he was noted to be progressively disoriented and confused. At that time, the patient was in the [facility] [place] where he completed an MRI showing a large enhancing left frontal mass with edema. He was subsequently brought back to [place], his place of residence and presented to the [facility] ER on Jan 24. On initial exam, the patient was only oriented to self, answering questions inappropriately and perseverating with a RUE pronator drive and no focal deficit. CTH in the ER confirmed a large left frontal lesion with significant vasogenic edema and midline shift. 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 Pt underwent gross total resection of the lesion on 1/27/2022. Pt was extubated 1/30. Pt has also been hyperglycemic during this admission, initially on 4u q6 NPH then to 10 units q6. One episode of hypoglycemia POCT 61.    Pt appears confused today, not oriented. Answering some questions appropriately but some responses not appropriate (i.e. what year is it? Saturday). Pt states he feels good but ROS positive for headache.     PCP: Avery Juarez [phone]    PAST MEDICAL & SURGICAL HISTORY:  Diabetes mellitus  Hypertension  HLD    Review of Systems - limited by patient confusion.   CONSTITUTIONAL: No fever, chills  HEENT: No sore throat, vision changes  RESPIRATORY: No cough, difficulty breathing  CARDIOVASCULAR: No chest pain, palpitations, leg edema  GASTROINTESTINAL: No abdominal pain, nausea, vomiting, diarrhea or constipation.  GENITOURINARY: No dysuria, frequency, hematuria  NEUROLOGICAL: +headache, confusion, No weakness  SKIN: No rashes or lesions   MUSCULOSKELETAL: No joint pain or swelling; No muscle, back, or extremity pain  PSYCHIATRIC: No depression, anxiety  HEME/LYMPH: No easy bruising, or bleeding gums    Allergies  No Known Allergies  Intolerances    Social History: works as a , denies toxic habits x 3    FAMILY HISTORY: diabetes in mom?     Home Medications:  aspirin 81 mg oral delayed release tablet: 1 tab(s) orally once a day (02 Feb 2022 11:41)  atorvastatin 10 mg oral tablet: 1 tab(s) orally once a day (02 Feb 2022 11:41)  lisinopril 2.5 mg oral tablet: 1 tab(s) orally once a day (02 Feb 2022 11:41)  metFORMIN 1000 mg oral tablet: 1 tab(s) orally 2 times a day (02 Feb 2022 11:41)    MEDICATIONS  (STANDING):  atorvastatin 20 milliGRAM(s) Oral at bedtime  dexAMETHasone  Injectable 3 milliGRAM(s) IV Push every 8 hours  dextrose 40% Gel 15 Gram(s) Oral once  dextrose 5%. 1000 milliLiter(s) (50 mL/Hr) IV Continuous <Continuous>  dextrose 5%. 1000 milliLiter(s) (100 mL/Hr) IV Continuous <Continuous>  dextrose 50% Injectable 50 milliLiter(s) IV Push every 15 minutes  dextrose 50% Injectable 25 milliLiter(s) IV Push every 15 minutes  dextrose 50% Injectable 12.5 Gram(s) IV Push once  dextrose 50% Injectable 25 Gram(s) IV Push once  enoxaparin Injectable 40 milliGRAM(s) SubCutaneous <User Schedule>  glucagon  Injectable 1 milliGRAM(s) IntraMuscular once  insulin glargine Injectable (LANTUS) 15 Unit(s) SubCutaneous at bedtime  insulin lispro (ADMELOG) corrective regimen sliding scale.   SubCutaneous every 6 hours  insulin lispro Injectable (ADMELOG) 12 Unit(s) SubCutaneous three times a day before meals  lacosamide Solution 100 milliGRAM(s) Oral two times a day  lisinopril 10 milliGRAM(s) Oral daily  pantoprazole  Injectable 40 milliGRAM(s) IV Push at bedtime  polyethylene glycol 3350 17 Gram(s) Oral every 12 hours  senna 2 Tablet(s) Oral at bedtime  sodium chloride 3 Gram(s) Oral every 6 hours  sodium chloride 0.9%. 1000 milliLiter(s) (50 mL/Hr) IV Continuous <Continuous>    MEDICATIONS  (PRN):  acetaminophen     Tablet .. 650 milliGRAM(s) Oral every 6 hours PRN Temp greater or equal to 38C (100.4F), Mild Pain (1 - 3)  bisacodyl 5 milliGRAM(s) Oral daily PRN Constipation  haloperidol    Injectable 2 milliGRAM(s) IV Push every 6 hours PRN Agitation    CAPILLARY BLOOD GLUCOSE      POCT Blood Glucose.: 276 mg/dL (02 Feb 2022 11:51)  POCT Blood Glucose.: 219 mg/dL (02 Feb 2022 05:17)  POCT Blood Glucose.: 111 mg/dL (01 Feb 2022 23:10)  POCT Blood Glucose.: 131 mg/dL (01 Feb 2022 21:37)  POCT Blood Glucose.: 61 mg/dL (01 Feb 2022 20:55)  POCT Blood Glucose.: 75 mg/dL (01 Feb 2022 17:54)    I&O's Summary    01 Feb 2022 07:01  -  02 Feb 2022 07:00  --------------------------------------------------------  IN: 1820 mL / OUT: 950 mL / NET: 870 mL    Physical Exam:  Vital Signs Last 24 Hrs  T(C): 36.6 (02 Feb 2022 07:18), Max: 36.9 (01 Feb 2022 15:40)  T(F): 97.9 (02 Feb 2022 07:18), Max: 98.5 (01 Feb 2022 15:40)  HR: 53 (02 Feb 2022 07:18) (48 - 94)  BP: 151/83 (02 Feb 2022 07:18) (143/78 - 173/85)  RR: 18 (02 Feb 2022 07:18) (18 - 18)  SpO2: 97% (02 Feb 2022 07:18) (94% - 97%)    CONSTITUTIONAL: NAD, watching TV, comfortable, in wrist restraints  HEAD: staples across head, no sign of wound infection  EYES: EOMI, PERRL  NECK: Supple, no palpable masses; no thyromegaly  RESPIRATORY: CTABL/ no wheezing or respiratory distress  CARDIOVASCULAR: normal S1 and S2, no murmur/rub/gallop; No lower extremity edema  ABDOMEN: soft, nt, nd, normoactive bowel sounds  MUSCULOSKELETAL: no clubbing or cyanosis of digits; no joint swelling or tenderness to palpation  PSYCH: AOx1  NEUROLOGY: diffuse weakness, nonfocal  SKIN: No rashes; no palpable lesions    LABS:                        11.0   8.71  )-----------( 129      ( 02 Feb 2022 06:11 )             32.2     02-02    133<L>  |  99  |  18  ----------------------------<  218<H>  4.0   |  22  |  0.60    Ca    8.7      02 Feb 2022 06:09  Phos  3.2     02-01  Mg     1.9     02-01      RADIOLOGY & ADDITIONAL TESTS:  Results Reviewed:   < from: CT Head No Cont (01.30.22 @ 09:32) >  Postoperative changes compatible the high left frontal craniotomy is   again seen. Evaluation of the postop bed does demonstrate abnormal   low-attenuation though there are scattered areas of high attenuation   again seen which could be compatible with areas of hemorrhage at the   postop material. Extra-axial fluid/air is identified in the postoperative   region which measures approximately 1.6 cm in widest diameter. Overall   the amount of air has decreased when compared with the prior exam. This   finding previously measured approximately 1.3 cm in widest diameter. Area   of vasogenic edema around the postop region is again seen and unchanged.   Mass effect on the left lateral ventricle is again seen with   left-to-right shift at the C6 (9.6 mm).    < end of copied text >  < from: CT Head No Cont (01.29.22 @ 09:51) >  IMPRESSION:  -Interval development of acute hemorrhage within the left frontal   resection cavity measuring up to 2 cm.    -Interval slight decrease in rightward midline shift, now 1 cm.    -Other postsurgical changes as described above. Stable hypoattenuation   surrounding the resection cavity.     Findings discussed with SERGIO Cooney at 12:13 PM on 1/29/2022 by Dr. Ulloa    < end of copied text >  < from: CT Head No Cont (01.28.22 @ 10:03) >  IMPRESSION: Decrease in left frontal extra axial air, the rest of the   study appears relatively unchanged when allowing for differences in   technique.    < end of copied text >  < from: CT Head No Cont (01.27.22 @ 17:11) >  IMPRESSION:  No significant change from one hour prior. Postop changes in   the left frontal lobe. Mass effect, midline shift and subfalcine   herniation remain.    < end of copied text >  < from: CT Head No Cont (01.27.22 @ 15:40) >  IMPRESSION:  Left frontal craniotomy with postsurgical changes as described above.   Stable left to right midline shift. Persistent left subfalcine   herniation. Increased mass effect on the third ventricle which is now   effaced.    < end of copied text >  < from: CT Head No Cont (01.24.22 @ 19:35) >  IMPRESSION:  Extensive lesion and surrounding vasogenic edema in left frontal and   temporal lobes with involvement of the corpus callosum and medial left   frontal lobe with marked ventricular effacement and 1.4 cm left to right   midline shift.  Consider submitting prior exams for comparison.    < end of copied text >    Imaging Personally Reviewed:   Electrocardiogram Personally Reviewed: NSR with R axis deviation    COORDINATION OF CARE:  Care Discussed with Consultants/Other Providers [Y/N]:  Prior or Outpatient Records Reviewed [Y/N]: Javier Bermeo MD, PGY-3  Available on [employer] Teams | Pager: [phone] | [facility]: [phone]    HPI:  HPI obtained from chart review due to patient's confusion. He is a 66M who was in his usual state of health until earlier this month (Jan 2022) when he was noted to be progressively disoriented and confused. At that time, the patient was in the [place] where he completed an MRI showing a large enhancing left frontal mass with edema. He was subsequently brought back to [place], his place of residence and presented to the [facility] ER on Jan 24. On initial exam, the patient was only oriented to self, answering questions inappropriately and perseverating with a RUE pronator drive and no focal deficit. CTH in the ER confirmed a large left frontal lesion with significant vasogenic edema and midline shift. 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 Pt underwent gross total resection of the lesion on 1/27. Pt was extubated 1/30. Pt has also been hyperglycemic during this admission, initially on 4u q6 NPH then to 10 units q6. One episode of hypoglycemia POCT 61.    Pt appears confused today, not oriented. Answering some questions appropriately but some responses not appropriate (i.e. what year is it? Saturday). Pt states he feels good but ROS positive for headache.     PCP: Avery Juarez [phone]    PAST MEDICAL & SURGICAL HISTORY:  Diabetes mellitus  Hypertension  HLD    Review of Systems - limited by patient confusion.   CONSTITUTIONAL: No fever, chills  HEENT: No sore throat, vision changes  RESPIRATORY: No cough, difficulty breathing  CARDIOVASCULAR: No chest pain, palpitations, leg edema  GASTROINTESTINAL: No abdominal pain, nausea, vomiting, diarrhea or constipation.  GENITOURINARY: No dysuria, frequency, hematuria  NEUROLOGICAL: +headache, confusion, No weakness  SKIN: No rashes or lesions   MUSCULOSKELETAL: No joint pain or swelling; No muscle, back, or extremity pain  PSYCHIATRIC: No depression, anxiety  HEME/LYMPH: No easy bruising, or bleeding gums    Allergies  No Known Allergies  Intolerances    Social History: works as a , denies toxic habits x 3    FAMILY HISTORY: diabetes in mom?     Home Medications:  aspirin 81 mg oral delayed release tablet: 1 tab(s) orally once a day (02 Feb 2022 11:41)  atorvastatin 10 mg oral tablet: 1 tab(s) orally once a day (02 Feb 2022 11:41)  lisinopril 2.5 mg oral tablet: 1 tab(s) orally once a day (02 Feb 2022 11:41)  metFORMIN 1000 mg oral tablet: 1 tab(s) orally 2 times a day (02 Feb 2022 11:41)    MEDICATIONS  (STANDING):  atorvastatin 20 milliGRAM(s) Oral at bedtime  dexAMETHasone  Injectable 3 milliGRAM(s) IV Push every 8 hours  dextrose 40% Gel 15 Gram(s) Oral once  dextrose 5%. 1000 milliLiter(s) (50 mL/Hr) IV Continuous <Continuous>  dextrose 5%. 1000 milliLiter(s) (100 mL/Hr) IV Continuous <Continuous>  dextrose 50% Injectable 50 milliLiter(s) IV Push every 15 minutes  dextrose 50% Injectable 25 milliLiter(s) IV Push every 15 minutes  dextrose 50% Injectable 12.5 Gram(s) IV Push once  dextrose 50% Injectable 25 Gram(s) IV Push once  enoxaparin Injectable 40 milliGRAM(s) SubCutaneous <User Schedule>  glucagon  Injectable 1 milliGRAM(s) IntraMuscular once  insulin glargine Injectable (LANTUS) 15 Unit(s) SubCutaneous at bedtime  insulin lispro (ADMELOG) corrective regimen sliding scale.   SubCutaneous every 6 hours  insulin lispro Injectable (ADMELOG) 12 Unit(s) SubCutaneous three times a day before meals  lacosamide Solution 100 milliGRAM(s) Oral two times a day  lisinopril 10 milliGRAM(s) Oral daily  pantoprazole  Injectable 40 milliGRAM(s) IV Push at bedtime  polyethylene glycol 3350 17 Gram(s) Oral every 12 hours  senna 2 Tablet(s) Oral at bedtime  sodium chloride 3 Gram(s) Oral every 6 hours  sodium chloride 0.9%. 1000 milliLiter(s) (50 mL/Hr) IV Continuous <Continuous>    MEDICATIONS  (PRN):  acetaminophen     Tablet .. 650 milliGRAM(s) Oral every 6 hours PRN Temp greater or equal to 38C (100.4F), Mild Pain (1 - 3)  bisacodyl 5 milliGRAM(s) Oral daily PRN Constipation  haloperidol    Injectable 2 milliGRAM(s) IV Push every 6 hours PRN Agitation    CAPILLARY BLOOD GLUCOSE      POCT Blood Glucose.: 276 mg/dL (02 Feb 2022 11:51)  POCT Blood Glucose.: 219 mg/dL (02 Feb 2022 05:17)  POCT Blood Glucose.: 111 mg/dL (01 Feb 2022 23:10)  POCT Blood Glucose.: 131 mg/dL (01 Feb 2022 21:37)  POCT Blood Glucose.: 61 mg/dL (01 Feb 2022 20:55)  POCT Blood Glucose.: 75 mg/dL (01 Feb 2022 17:54)    I&O's Summary    01 Feb 2022 07:01  -  02 Feb 2022 07:00  --------------------------------------------------------  IN: 1820 mL / OUT: 950 mL / NET: 870 mL    Physical Exam:  Vital Signs Last 24 Hrs  T(C): 36.6 (02 Feb 2022 07:18), Max: 36.9 (01 Feb 2022 15:40)  T(F): 97.9 (02 Feb 2022 07:18), Max: 98.5 (01 Feb 2022 15:40)  HR: 53 (02 Feb 2022 07:18) (48 - 94)  BP: 151/83 (02 Feb 2022 07:18) (143/78 - 173/85)  RR: 18 (02 Feb 2022 07:18) (18 - 18)  SpO2: 97% (02 Feb 2022 07:18) (94% - 97%)    CONSTITUTIONAL: NAD, watching TV, comfortable, in wrist restraints  HEAD: staples across head, no sign of wound infection  EYES: EOMI, PERRL  NECK: Supple, no palpable masses; no thyromegaly  RESPIRATORY: CTABL/ no wheezing or respiratory distress  CARDIOVASCULAR: normal S1 and S2, no murmur/rub/gallop; No lower extremity edema  ABDOMEN: soft, nt, nd, normoactive bowel sounds  MUSCULOSKELETAL: no clubbing or cyanosis of digits; no joint swelling or tenderness to palpation  PSYCH: AOx1-2 (month, year)  NEUROLOGY: follows simple commands, moves all extremities  SKIN: No rashes; no palpable lesions    LABS:                        11.0   8.71  )-----------( 129      ( 02 Feb 2022 06:11 )             32.2     02-02    133<L>  |  99  |  18  ----------------------------<  218<H>  4.0   |  22  |  0.60    Ca    8.7      02 Feb 2022 06:09  Phos  3.2     02-01  Mg     1.9     02-01      RADIOLOGY & ADDITIONAL TESTS:  Results Reviewed:   < from: CT Head No Cont (01.30.22 @ 09:32) >  Postoperative changes compatible the high left frontal craniotomy is   again seen. Evaluation of the postop bed does demonstrate abnormal   low-attenuation though there are scattered areas of high attenuation   again seen which could be compatible with areas of hemorrhage at the   postop material. Extra-axial fluid/air is identified in the postoperative   region which measures approximately 1.6 cm in widest diameter. Overall   the amount of air has decreased when compared with the prior exam. This   finding previously measured approximately 1.3 cm in widest diameter. Area   of vasogenic edema around the postop region is again seen and unchanged.   Mass effect on the left lateral ventricle is again seen with   left-to-right shift at the C6 (9.6 mm).    < end of copied text >  < from: CT Head No Cont (01.29.22 @ 09:51) >  IMPRESSION:  -Interval development of acute hemorrhage within the left frontal   resection cavity measuring up to 2 cm.    -Interval slight decrease in rightward midline shift, now 1 cm.    -Other postsurgical changes as described above. Stable hypoattenuation   surrounding the resection cavity.     Findings discussed with SERGIO Cooney at 12:13 PM on 1/29/2022 by Dr. Ulloa    < end of copied text >  < from: CT Head No Cont (01.28.22 @ 10:03) >  IMPRESSION: Decrease in left frontal extra axial air, the rest of the   study appears relatively unchanged when allowing for differences in   technique.    < end of copied text >  < from: CT Head No Cont (01.27.22 @ 17:11) >  IMPRESSION:  No significant change from one hour prior. Postop changes in   the left frontal lobe. Mass effect, midline shift and subfalcine   herniation remain.    < end of copied text >  < from: CT Head No Cont (01.27.22 @ 15:40) >  IMPRESSION:  Left frontal craniotomy with postsurgical changes as described above.   Stable left to right midline shift. Persistent left subfalcine   herniation. Increased mass effect on the third ventricle which is now   effaced.    < end of copied text >  < from: CT Head No Cont (01.24.22 @ 19:35) >  IMPRESSION:  Extensive lesion and surrounding vasogenic edema in left frontal and   temporal lobes with involvement of the corpus callosum and medial left   frontal lobe with marked ventricular effacement and 1.4 cm left to right   midline shift.  Consider submitting prior exams for comparison.    < end of copied text >    Imaging Personally Reviewed:   Electrocardiogram Personally Reviewed: NSR with R axis deviation    COORDINATION OF CARE:  Care Discussed with Consultants/Other Providers [Y/N]: neurosurgery PA(Roxane)  Prior or Outpatient Records Reviewed [Y/N]: yes Javier Bermeo MD, PGY-3  Available on [employer] Teams | Pager: [phone] | [facility]: [id number]    HPI:  HPI obtained from chart review due to patient's confusion. He is a 66M who was in his usual state of health until earlier this month (Jan 2022) when he was noted to be progressively disoriented and confused. At that time, the patient was in the [place] where he completed an MRI showing a large enhancing left frontal mass with edema. He was subsequently brought back to [place], his place of residence and presented to the [facility] ER on Jan 24. On initial exam, the patient was only oriented to self, answering questions inappropriately and perseverating with a RUE pronator drive and no focal deficit. CTH in the ER confirmed a large left frontal lesion with significant vasogenic edema and midline shift. 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 Pt underwent gross total resection of the lesion on 1/27. Pt was extubated 1/30. Pt has also been hyperglycemic during this admission, initially on 4u q6 NPH then to 10 units q6. One episode of hypoglycemia POCT 61.    Pt appears confused today, not oriented. Answering some questions appropriately but some responses not appropriate (i.e. what year is it? Saturday). Pt states he feels good but ROS positive for headache. Denies chest pain, SOB, cough.    PCP: Avery Juarez [phone]    PAST MEDICAL & SURGICAL HISTORY:  Diabetes mellitus  Hypertension  HLD    Review of Systems - limited by patient confusion.   CONSTITUTIONAL: No fever, chills  HEENT: No sore throat, vision changes  RESPIRATORY: No cough, difficulty breathing  CARDIOVASCULAR: No chest pain, palpitations, leg edema  GASTROINTESTINAL: No abdominal pain, nausea, vomiting, diarrhea or constipation.  GENITOURINARY: No dysuria, frequency, hematuria  NEUROLOGICAL: +headache, confusion, No weakness  SKIN: No rashes or lesions   MUSCULOSKELETAL: No joint pain or swelling; No muscle, back, or extremity pain  PSYCHIATRIC: No depression, anxiety  HEME/LYMPH: No easy bruising, or bleeding gums    Allergies  No Known Allergies  Intolerances    Social History: works as a , denies toxic habits x 3    FAMILY HISTORY: diabetes in mom?     Home Medications:  aspirin 81 mg oral delayed release tablet: 1 tab(s) orally once a day (02 Feb 2022 11:41)  atorvastatin 10 mg oral tablet: 1 tab(s) orally once a day (02 Feb 2022 11:41)  lisinopril 2.5 mg oral tablet: 1 tab(s) orally once a day (02 Feb 2022 11:41)  metFORMIN 1000 mg oral tablet: 1 tab(s) orally 2 times a day (02 Feb 2022 11:41)    MEDICATIONS  (STANDING):  atorvastatin 20 milliGRAM(s) Oral at bedtime  dexAMETHasone  Injectable 3 milliGRAM(s) IV Push every 8 hours  dextrose 40% Gel 15 Gram(s) Oral once  dextrose 5%. 1000 milliLiter(s) (50 mL/Hr) IV Continuous <Continuous>  dextrose 5%. 1000 milliLiter(s) (100 mL/Hr) IV Continuous <Continuous>  dextrose 50% Injectable 50 milliLiter(s) IV Push every 15 minutes  dextrose 50% Injectable 25 milliLiter(s) IV Push every 15 minutes  dextrose 50% Injectable 12.5 Gram(s) IV Push once  dextrose 50% Injectable 25 Gram(s) IV Push once  enoxaparin Injectable 40 milliGRAM(s) SubCutaneous <User Schedule>  glucagon  Injectable 1 milliGRAM(s) IntraMuscular once  insulin glargine Injectable (LANTUS) 15 Unit(s) SubCutaneous at bedtime  insulin lispro (ADMELOG) corrective regimen sliding scale.   SubCutaneous every 6 hours  insulin lispro Injectable (ADMELOG) 12 Unit(s) SubCutaneous three times a day before meals  lacosamide Solution 100 milliGRAM(s) Oral two times a day  lisinopril 10 milliGRAM(s) Oral daily  pantoprazole  Injectable 40 milliGRAM(s) IV Push at bedtime  polyethylene glycol 3350 17 Gram(s) Oral every 12 hours  senna 2 Tablet(s) Oral at bedtime  sodium chloride 3 Gram(s) Oral every 6 hours  sodium chloride 0.9%. 1000 milliLiter(s) (50 mL/Hr) IV Continuous <Continuous>    MEDICATIONS  (PRN):  acetaminophen     Tablet .. 650 milliGRAM(s) Oral every 6 hours PRN Temp greater or equal to 38C (100.4F), Mild Pain (1 - 3)  bisacodyl 5 milliGRAM(s) Oral daily PRN Constipation  haloperidol    Injectable 2 milliGRAM(s) IV Push every 6 hours PRN Agitation    CAPILLARY BLOOD GLUCOSE      POCT Blood Glucose.: 276 mg/dL (02 Feb 2022 11:51)  POCT Blood Glucose.: 219 mg/dL (02 Feb 2022 05:17)  POCT Blood Glucose.: 111 mg/dL (01 Feb 2022 23:10)  POCT Blood Glucose.: 131 mg/dL (01 Feb 2022 21:37)  POCT Blood Glucose.: 61 mg/dL (01 Feb 2022 20:55)  POCT Blood Glucose.: 75 mg/dL (01 Feb 2022 17:54)    I&O's Summary    01 Feb 2022 07:01  -  02 Feb 2022 07:00  --------------------------------------------------------  IN: 1820 mL / OUT: 950 mL / NET: 870 mL    Physical Exam:  Vital Signs Last 24 Hrs  T(C): 36.6 (02 Feb 2022 07:18), Max: 36.9 (01 Feb 2022 15:40)  T(F): 97.9 (02 Feb 2022 07:18), Max: 98.5 (01 Feb 2022 15:40)  HR: 53 (02 Feb 2022 07:18) (48 - 94)  BP: 151/83 (02 Feb 2022 07:18) (143/78 - 173/85)  RR: 18 (02 Feb 2022 07:18) (18 - 18)  SpO2: 97% (02 Feb 2022 07:18) (94% - 97%)    CONSTITUTIONAL: NAD, watching TV, comfortable, in wrist restraints  HEAD: staples across head, no sign of wound infection  EYES: EOMI, PERRL  NECK: Supple, no palpable masses; no thyromegaly  RESPIRATORY: CTABL/ no wheezing or respiratory distress  CARDIOVASCULAR: normal S1 and S2, no murmur/rub/gallop; No lower extremity edema  ABDOMEN: soft, nt, nd, normoactive bowel sounds  MUSCULOSKELETAL: no clubbing or cyanosis of digits; no joint swelling or tenderness to palpation  PSYCH: AOx1-2 (month, year)  NEUROLOGY: follows simple commands, moves all extremities  SKIN: No rashes; no palpable lesions    LABS:                        11.0   8.71  )-----------( 129      ( 02 Feb 2022 06:11 )             32.2     02-02    133<L>  |  99  |  18  ----------------------------<  218<H>  4.0   |  22  |  0.60    Ca    8.7      02 Feb 2022 06:09  Phos  3.2     02-01  Mg     1.9     02-01      RADIOLOGY & ADDITIONAL TESTS:  Results Reviewed:   < from: CT Head No Cont (01.30.22 @ 09:32) >  Postoperative changes compatible the high left frontal craniotomy is   again seen. Evaluation of the postop bed does demonstrate abnormal   low-attenuation though there are scattered areas of high attenuation   again seen which could be compatible with areas of hemorrhage at the   postop material. Extra-axial fluid/air is identified in the postoperative   region which measures approximately 1.6 cm in widest diameter. Overall   the amount of air has decreased when compared with the prior exam. This   finding previously measured approximately 1.3 cm in widest diameter. Area   of vasogenic edema around the postop region is again seen and unchanged.   Mass effect on the left lateral ventricle is again seen with   left-to-right shift at the C6 (9.6 mm).    < end of copied text >  < from: CT Head No Cont (01.29.22 @ 09:51) >  IMPRESSION:  -Interval development of acute hemorrhage within the left frontal   resection cavity measuring up to 2 cm.    -Interval slight decrease in rightward midline shift, now 1 cm.    -Other postsurgical changes as described above. Stable hypoattenuation   surrounding the resection cavity.     Findings discussed with SERGIO Cooney at 12:13 PM on 1/29/2022 by Dr. Ulloa    < end of copied text >  < from: CT Head No Cont (01.28.22 @ 10:03) >  IMPRESSION: Decrease in left frontal extra axial air, the rest of the   study appears relatively unchanged when allowing for differences in   technique.    < end of copied text >  < from: CT Head No Cont (01.27.22 @ 17:11) >  IMPRESSION:  No significant change from one hour prior. Postop changes in   the left frontal lobe. Mass effect, midline shift and subfalcine   herniation remain.    < end of copied text >  < from: CT Head No Cont (01.27.22 @ 15:40) >  IMPRESSION:  Left frontal craniotomy with postsurgical changes as described above.   Stable left to right midline shift. Persistent left subfalcine   herniation. Increased mass effect on the third ventricle which is now   effaced.    < end of copied text >  < from: CT Head No Cont (01.24.22 @ 19:35) >  IMPRESSION:  Extensive lesion and surrounding vasogenic edema in left frontal and   temporal lobes with involvement of the corpus callosum and medial left   frontal lobe with marked ventricular effacement and 1.4 cm left to right   midline shift.  Consider submitting prior exams for comparison.    < end of copied text >    Imaging Personally Reviewed:   Electrocardiogram Personally Reviewed: NSR with R axis deviation    COORDINATION OF CARE:  Care Discussed with Consultants/Other Providers [Y/N]: neurosurgery Jennifer)  Prior or Outpatient Records Reviewed [Y/N]: yes

## 2022-02-02 NOTE — PROGRESS NOTE ADULT - ASSESSMENT
ASSESSMENT AND PLAN: 66M hx HTN/DM was in the [place] early January, was confused and not answering appropriately, MRI Jan 7 showed L frontal enhancing mass with significant edema. Repeat CTH today showed significant vasogenic edema and 1.5cm    NEURO:   - Continue neuro checks q 4    PULM:     CV:    ENDO:     HEME/ONC:                      DVT ppx:     RENAL:     ID:     GI:      DISCHARGE PLANNING:       ASSESSMENT AND PLAN: 66M hx HTN/DM was in the [place] for vacation early January, was confused and not answering appropriately, MRI Jan 7 showed L frontal enhancing mass with significant edema. Repeat CTH today showed significant vasogenic edema and 1.5cm    s/p left craniotomy for resection of brain tumor (w/ Foster)    NEURO:   - Continue neuro checks q 4  - F/u surgical pathology  - Continue Decadron for brain tumor and cerebral edema, currently on 3mg q 8hours  - Continue Vimpat for seizure prophylaxis (was on EEG when he was in [place] - revealed no seizures)  - Continue pain control with tylenol prn  - Has Haldol 2mg IVP for prn agitation - was given one dose yesterday evening  - PT/OT/PMR for Acute Rehab    PULM:   - On room air, O2Sat>94%  - Incentive spirometry    CV:  - -160  - Continue Lisinopril for HTN  - Continue Lipitor for HLD    ENDO:   - HgbA1c 10.7  - Hypoglycemic overnight and was given D50x1 and was placed on D5W for 6 hours - no longer hypoglycemic  - Last fingerstick was 276 - hyperglycemia likely a combination of his Diabetes and also Steroids  - Was previously on Glucerna TF and on NPH 10q6 with ISS, however passed S&S today - placed on CCD diet with Lantus 15 at bedtime and Admelog 12 units pre-meal, with ISS  - Will continue to monitor fingersticks    HEME/ONC:             Last CBC 2/2 stable         DVT ppx: SQL, SCDs, [place] 1/26 - negative    RENAL:   - IVL  - Last sodium is 133, corrected for hyperglycemia is 135  - Currently on salt tabs 3g q 6  - BMP in am    ID:   - Afebrile  - 1/26 COVID neg    GI:    - S&S seen today and advance from TF to easy to chew diet (CCD)  - Continue senna, miralax, and dulcolax, last BM 1/31    DISCHARGE PLANNING:   PT/OT/PMR - Acute Rehab    Plan to be discussed w/ Dr. Gupta  [id number]

## 2022-02-02 NOTE — CONSULT NOTE ADULT - PROBLEM SELECTOR RECOMMENDATION 6
DVT: lovenox. LE duplex on 1/26 negative for DVT.  Diet: DASH/CC  Dispo: acute rehab    will follow periodically. call with questions. DVT: lovenox. LE duplex on 1/26 negative for DVT.  bowel regimen- monitor BM. last documented on 1/31.  Dispo: acute rehab

## 2022-02-02 NOTE — CONSULT NOTE ADULT - SUBJECTIVE AND OBJECTIVE BOX
Patient is a 66y old  Male who presents with a chief complaint of GBM (01 Feb 2022 01:36)    Admission HPI:  66M hx HTN/DM was in the [place] for vacation early January, was confused and not answering appropriately, MRI Jan 7 showed L frontal enhancing mass with significant edema. Repeat CTH today showed significant vasogenic edema and 1.5cm MLS. Exam: Awake, Ox1, answering many questions inappropriately/perseverating, EOMI, R drift, DUMONT strongl (24 Jan 2022 20:04)    Interval History:  Patient went to OR on 1/27 for craniotomy for resection of GBM w vinh.  Post-op with functional deficits.  Most recent imaging:  CT Head No Cont (01.30.22 @ 09:32) >  Postoperative changes compatible the high left frontal craniotomy is   again seen. Evaluation of the postop bed does demonstrate abnormal   low-attenuation though there are scattered areas of high attenuation   again seen which could be compatible with areas of hemorrhage at the   postop material. Extra-axial fluid/air is identified in the postoperative   region which measures approximately 1.6 cm in widest diameter. Overall   the amount of air has decreased when compared with the prior exam. This   finding previously measured approximately 1.3 cm in widest diameter. Area   of vasogenic edema around the postop region is again seen and unchanged.   Mass effect on the left lateral ventricle is again seen with   left-to-right shift at the C6 (9.6 mm).    Evaluation of the osseous structures with the appropriate window aside   from postop changes appear normal    Extra-axial soft tissue swelling and stables with associated air are seen   involving the high bifrontal region.    Left maxillary polyp versus retention cyst is seen    Both mastoid and middle ear regions appear clear.    IMPRESSION: Postop changes are again seen as described above.    REVIEW OF SYSTEMS: No chest pain, shortness of breath, nausea, vomiting or diarhea; other ROS neg     PAST MEDICAL & SURGICAL HISTORY  Diabetes mellitus  Hypertension  No significant past surgical history    FUNCTIONAL HISTORY:   Lives alone in apartment w steps.  PTA Independent    CURRENT FUNCTIONAL STATUS:  Mod A bed mob and transfers    FAMILY HISTORY   Neg brain Ca    MEDICATIONS   acetaminophen     Tablet .. 650 milliGRAM(s) Oral every 6 hours PRN  atorvastatin 20 milliGRAM(s) Oral at bedtime  bisacodyl 5 milliGRAM(s) Oral daily PRN  dexAMETHasone  Injectable 3 milliGRAM(s) IV Push every 8 hours  dextrose 40% Gel 15 Gram(s) Oral once  dextrose 5%. 1000 milliLiter(s) IV Continuous <Continuous>  dextrose 5%. 1000 milliLiter(s) IV Continuous <Continuous>  dextrose 50% Injectable 50 milliLiter(s) IV Push every 15 minutes  dextrose 50% Injectable 25 milliLiter(s) IV Push every 15 minutes  dextrose 50% Injectable 12.5 Gram(s) IV Push once  dextrose 50% Injectable 25 Gram(s) IV Push once  enoxaparin Injectable 40 milliGRAM(s) SubCutaneous <User Schedule>  glucagon  Injectable 1 milliGRAM(s) IntraMuscular once  haloperidol    Injectable 2 milliGRAM(s) IV Push every 6 hours PRN  insulin lispro (ADMELOG) corrective regimen sliding scale.   SubCutaneous every 6 hours  insulin NPH human recombinant 10 Unit(s) SubCutaneous every 6 hours  lacosamide Solution 100 milliGRAM(s) Oral two times a day  lisinopril 10 milliGRAM(s) Oral daily  pantoprazole  Injectable 40 milliGRAM(s) IV Push at bedtime  polyethylene glycol 3350 17 Gram(s) Oral every 12 hours  senna 2 Tablet(s) Oral at bedtime  sodium chloride 3 Gram(s) Oral every 6 hours  sodium chloride 0.9%. 1000 milliLiter(s) IV Continuous <Continuous>    ALLERGIES  No Known Allergies    VITALS  T(C): 36.6 (02-02-22 @ 07:18), Max: 36.9 (02-01-22 @ 11:00)  HR: 53 (02-02-22 @ 07:18) (48 - 94)  BP: 151/83 (02-02-22 @ 07:18) (143/78 - 173/85)  RR: 18 (02-02-22 @ 07:18) (18 - 20)  SpO2: 97% (02-02-22 @ 07:18) (94% - 97%)  Wt(kg): --    PHYSICAL EXAM  Constitutional - NAD, Comfortable  HEENT - NCAT, EOMI  Neck - Supple, No limited ROM  Chest - CTA bilaterally, No wheeze, No rhonchi, No crackles  Cardiovascular - Yomi, S1S2, No murmurs  Abdomen - BS+, Soft, NTND  Extremities - No C/C/E, No calf tenderness   Neurologic Exam -                    Cognitive - Awake, Alert, AAO to self, place, date, year, situation     Communication - Fluent, No dysarthria, no aphasia     Cranial Nerves - CN 2-12 intact     Motor - No focal deficits      Sensory - Intact to LT     Reflexes - DTR Intact, No primitive reflexive  Psychiatric - Mood stable, Affect WNL    RECENT LABS/IMAGING  CBC Full  -  ( 02 Feb 2022 06:11 )  WBC Count : 8.71 K/uL  RBC Count : 3.55 M/uL  Hemoglobin : 11.0 g/dL  Hematocrit : 32.2 %  Platelet Count - Automated : 129 K/uL  Mean Cell Volume : 90.7 fl  Mean Cell Hemoglobin : 31.0 pg  Mean Cell Hemoglobin Concentration : 34.2 gm/dL  Auto Neutrophil # : x  Auto Lymphocyte # : x  Auto Monocyte # : x  Auto Eosinophil # : x  Auto Basophil # : x  Auto Neutrophil % : x  Auto Lymphocyte % : x  Auto Monocyte % : x  Auto Eosinophil % : x  Auto Basophil % : x    02-02    133<L>  |  99  |  18  ----------------------------<  218<H>  4.0   |  22  |  0.60    Ca    8.7      02 Feb 2022 06:09  Phos  3.2     02-01  Mg     1.9     02-01    Impression:  65 yo with functional deficits secondary to diagnosis of Brain Mass    Plan:  PT- ROM, Bed Mob, Transfers, Amb w AD and bracing as needed  OT- ADLs, bracing  SLP- Dysphagia eval and treat  Prec- Falls, Cardiac  Monitor Na+  DVT Prophylaxis- Lovenox  Skin- Turn q2 h  Dispo-  Patient is a 66y old  Male who presents with a chief complaint of GBM (01 Feb 2022 01:36)    Admission HPI:  66M hx HTN/DM was in [place] vacation early January, was confused and not answering appropriately, MRI Jan 7 showed L frontal enhancing mass with significant edema. Repeat CTH today showed significant vasogenic edema and 1.5cm MLS. Exam: Awake, Ox1, answering many questions inappropriately/perseverating, EOMI, R drift, DUMONT strongl (24 Jan 2022 20:04)    Interval History:  Patient went to OR on 1/27 for craniotomy for resection of GBM w vinh.  Post-op with functional deficits.  Most recent imaging:  CT Head No Cont (01.30.22 @ 09:32) >  Postoperative changes compatible the high left frontal craniotomy is   again seen. Evaluation of the postop bed does demonstrate abnormal   low-attenuation though there are scattered areas of high attenuation   again seen which could be compatible with areas of hemorrhage at the   postop material. Extra-axial fluid/air is identified in the postoperative   region which measures approximately 1.6 cm in widest diameter. Overall   the amount of air has decreased when compared with the prior exam. This   finding previously measured approximately 1.3 cm in widest diameter. Area   of vasogenic edema around the postop region is again seen and unchanged.   Mass effect on the left lateral ventricle is again seen with   left-to-right shift at the C6 (9.6 mm).    Evaluation of the osseous structures with the appropriate window aside   from postop changes appear normal    Extra-axial soft tissue swelling and stables with associated air are seen   involving the high bifrontal region.    Left maxillary polyp versus retention cyst is seen    Both mastoid and middle ear regions appear clear.    IMPRESSION: Postop changes are again seen as described above.    REVIEW OF SYSTEMS: Difficult to attain due to mental status.    PAST MEDICAL & SURGICAL HISTORY  Diabetes mellitus  Hypertension  No significant past surgical history    FUNCTIONAL HISTORY:   Lives alone in apartment w steps.  PTA Independent    CURRENT FUNCTIONAL STATUS:  Mod A bed mob and transfers    FAMILY HISTORY   Neg brain Ca    MEDICATIONS   acetaminophen     Tablet .. 650 milliGRAM(s) Oral every 6 hours PRN  atorvastatin 20 milliGRAM(s) Oral at bedtime  bisacodyl 5 milliGRAM(s) Oral daily PRN  dexAMETHasone  Injectable 3 milliGRAM(s) IV Push every 8 hours  dextrose 40% Gel 15 Gram(s) Oral once  dextrose 5%. 1000 milliLiter(s) IV Continuous <Continuous>  dextrose 5%. 1000 milliLiter(s) IV Continuous <Continuous>  dextrose 50% Injectable 50 milliLiter(s) IV Push every 15 minutes  dextrose 50% Injectable 25 milliLiter(s) IV Push every 15 minutes  dextrose 50% Injectable 12.5 Gram(s) IV Push once  dextrose 50% Injectable 25 Gram(s) IV Push once  enoxaparin Injectable 40 milliGRAM(s) SubCutaneous <User Schedule>  glucagon  Injectable 1 milliGRAM(s) IntraMuscular once  haloperidol    Injectable 2 milliGRAM(s) IV Push every 6 hours PRN  insulin lispro (ADMELOG) corrective regimen sliding scale.   SubCutaneous every 6 hours  insulin NPH human recombinant 10 Unit(s) SubCutaneous every 6 hours  lacosamide Solution 100 milliGRAM(s) Oral two times a day  lisinopril 10 milliGRAM(s) Oral daily  pantoprazole  Injectable 40 milliGRAM(s) IV Push at bedtime  polyethylene glycol 3350 17 Gram(s) Oral every 12 hours  senna 2 Tablet(s) Oral at bedtime  sodium chloride 3 Gram(s) Oral every 6 hours  sodium chloride 0.9%. 1000 milliLiter(s) IV Continuous <Continuous>    ALLERGIES  No Known Allergies    VITALS  T(C): 36.6 (02-02-22 @ 07:18), Max: 36.9 (02-01-22 @ 11:00)  HR: 53 (02-02-22 @ 07:18) (48 - 94)  BP: 151/83 (02-02-22 @ 07:18) (143/78 - 173/85)  RR: 18 (02-02-22 @ 07:18) (18 - 20)  SpO2: 97% (02-02-22 @ 07:18) (94% - 97%)  Wt(kg): --    PHYSICAL EXAM  Constitutional - NAD, Comfortable  HEENT - Staples intact, EOMI  Neck - Supple, No limited ROM  Chest - CTA bilaterally, No wheeze, No rhonchi, No crackles  Cardiovascular - Yomi, S1S2, No murmurs  Abdomen - BS+, Soft, NTND  Extremities - No C/C/E, No calf tenderness   Neurologic Exam -                 AAOx2  Poor insight- states he is here "for a job"  Motor 5/5 bl UE/LEs  no clonus     Psychiatric - Mood stable, Affect WNL    RECENT LABS/IMAGING  CBC Full  -  ( 02 Feb 2022 06:11 )  WBC Count : 8.71 K/uL  RBC Count : 3.55 M/uL  Hemoglobin : 11.0 g/dL  Hematocrit : 32.2 %  Platelet Count - Automated : 129 K/uL  Mean Cell Volume : 90.7 fl  Mean Cell Hemoglobin : 31.0 pg  Mean Cell Hemoglobin Concentration : 34.2 gm/dL  Auto Neutrophil # : x  Auto Lymphocyte # : x  Auto Monocyte # : x  Auto Eosinophil # : x  Auto Basophil # : x  Auto Neutrophil % : x  Auto Lymphocyte % : x  Auto Monocyte % : x  Auto Eosinophil % : x  Auto Basophil % : x    02-02    133<L>  |  99  |  18  ----------------------------<  218<H>  4.0   |  22  |  0.60    Ca    8.7      02 Feb 2022 06:09  Phos  3.2     02-01  Mg     1.9     02-01    Impression:  67 yo with functional deficits secondary to diagnosis of Brain Mass    Plan:  PT- ROM, Bed Mob, Transfers, Amb w AD and bracing as needed  OT- ADLs, bracing  SLP- Dysphagia eval and treat  Prec- Falls, Cardiac  Monitor Na+  DVT Prophylaxis- Lovenox  Skin- Turn q2 h  Dispo- Acute TBI Rehab- can tolerate 3h/d of therapies and requires daily physician visits

## 2022-02-02 NOTE — CONSULT NOTE ADULT - PROBLEM SELECTOR RECOMMENDATION 5
DVT: SCDs  Diet: DASH/CC  Dispo: onc    Transitions of Care Status:  1.  Name of PCP: Dr. Avery Juarez  2.  PCP Contacted on Admission: [ ] Y    [ ] N    3.  PCP contacted at Discharge: [ ] Y    [ ] N    [ ] N/A  4.  Post-Discharge Appointment Date and Location:  5.  Summary of Handoff given to PCP: DVT: SCDs  Diet: DASH/CC  Dispo: pending    Transitions of Care Status:  1.  Name of PCP: Dr. Avery Juarez  2.  PCP Contacted on Admission: [ ] Y    [ ] N    3.  PCP contacted at Discharge: [ ] Y    [ ] N    [ ] N/A  4.  Post-Discharge Appointment Date and Location:  5.  Summary of Handoff given to PCP: DVT: lovenox  Diet: DASH/CC  Dispo: pending    Transitions of Care Status:  1.  Name of PCP: Dr. Avery Juarez  2.  PCP Contacted on Admission: [ ] Y    [ ] N    3.  PCP contacted at Discharge: [ ] Y    [ ] N    [ ] N/A  4.  Post-Discharge Appointment Date and Location:  5.  Summary of Handoff given to PCP: - mild and remains relatively stable  - monitor CBC periodically

## 2022-02-02 NOTE — CONSULT NOTE ADULT - PROBLEM SELECTOR RECOMMENDATION 2
On lisinopril 2.5 mg daily at home  - c/w lisinopril 10 mg daily, may uptitrate if pt continues to be hypertensive On lisinopril 2.5 mg daily at home  - c/w lisinopril 10 mg daily and monitor BP

## 2022-02-02 NOTE — CONSULT NOTE ADULT - ATTENDING COMMENTS
patient seen and examined earlier today. case d/w Dr. Bermeo and neurosurgery PA.    66M with hx of T2DM, HTN, HLD a/w large left frontal brain mass with cerebral edema and brain compression concerning for GBM s/p left crani for resection with Gleolan on 1/27.   - monitor FS glucose and adjust insulin accordingly  - patient now on PO diet, NG tube removed    will follow periodically. call with questions.

## 2022-02-02 NOTE — CONSULT NOTE ADULT - PROBLEM SELECTOR RECOMMENDATION 9
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 c/b encephalopathy may be secondary to delirium vs recent GBM resection   - Management per neurosurgery  - Dexamethasone 3 mg q8 hrs  - Lacosamide 100 bid  - PPI while on steroids  - PT/OT/SS    # Encephalopathy  - Delirium vs recent GBM resection  - start melatonin 5-6 mg qhs to promote normal wake/sleep cycles  - attempt reorientation when disoriented 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

## 2022-02-02 NOTE — CONSULT NOTE ADULT - ASSESSMENT
66M with hx of T2DM, HTN, HLD here with new diagnosis of head mass s/p resection.  66M with hx of T2DM, HTN, HLD here with GBM s/p resection.  66M with hx of T2DM, HTN, HLD a/w large left frontal brain mass with cerebral edema and brain compression concerning for GBM s/p left crani for resection with Gleolan on 1/27.

## 2022-02-02 NOTE — CONSULT NOTE ADULT - PROBLEM SELECTOR RECOMMENDATION 3
On metformin 1g bid at home; last a1c 10.7 on this admission  - Start Basal / Bolus   - FS/JANIE qac/qhs  - Monitor for hyperglycemia while on steroids On metformin 1g bid at home; last a1c 10.7 on this admission  - Recommend basal 12u, bolus 4 units with medium-intensity sliding scale  - Will titrate insulin depending on FS trend  - FS/MISS qac/qhs  - Monitor for hyperglycemia while on steroids On metformin 1g bid at home; last a1c 10.7 on this admission  - Recommend basal 12u, bolus 8 units with medium-intensity sliding scale  - Will titrate insulin depending on FS trend  - FS/MISS qac/qhs  - Monitor for hyperglycemia while on steroids On metformin 1000mg bid at home; last a1c 10.7 on this admission  - patient passed S&S and started on diet today  - start lantus 12u, premeal 8 units with medium-intensity sliding scale  - Will titrate insulin depending on FS trend  - FS/MISS qac/qhs  - Monitor for hyperglycemia while on steroids On metformin 1000mg bid at home; last a1c 10.7 on this admission  - patient passed S&S and started on diet today  - switch NPH to lantus 12u, premeal 8 units with medium-intensity sliding scale  - Will titrate insulin depending on FS trend  - FS/MISS qac/qhs  - Monitor for hyperglycemia while on steroids

## 2022-02-02 NOTE — PROGRESS NOTE ADULT - SUBJECTIVE AND OBJECTIVE BOX
SUBJECTIVE: HPI:  66M hx HTN/DM was [place] early January, was confused and not answering appropriately, MRI Jan 7 showed L frontal enhancing mass with significant edema. Repeat CTH today showed significant vasogenic edema and 1.5cm MLS. Exam: Awake, Ox1, answering many questions inappropriately/perseverating, EOMI, R drift, JULAI rosas (24 Jan 2022 20:04)      OVERNIGHT EVENTS: Hypoglycemic overnight with fingersticks 60 received D50 x 1 and was on D5W for 6hours (finished now) and this mornings fingersticks much improved. Patient seen and evaluated at bedside, wide awake and states that he is comfortable and that his pain well controlled with pain medications.     Vital Signs Last 24 Hrs  T(C): 36.6 (02 Feb 2022 07:18), Max: 36.9 (01 Feb 2022 11:00)  T(F): 97.9 (02 Feb 2022 07:18), Max: 98.5 (01 Feb 2022 15:40)  HR: 53 (02 Feb 2022 07:18) (48 - 94)  BP: 151/83 (02 Feb 2022 07:18) (143/78 - 173/85)  BP(mean): --  RR: 18 (02 Feb 2022 07:18) (18 - 20)  SpO2: 97% (02 Feb 2022 07:18) (94% - 97%)    PHYSICAL EXAM:    General: No Acute Distress     Neurological: Awake, alert, Ox1-2 (name and month), following commands, RUE drift, RUE 5/5, LUE 5/5, LLE 5/5, sensation intact    Pulmonary: Clear to Auscultation, No Rales, No Rhonchi, No Wheezes     Cardiovascular: S1, S2, Regular Rate and Rhythm     Gastrointestinal: Soft, Nontender, Nondistended     Incision: Crani staples in place, C/D/I    LABS:                        11.0   8.71  )-----------( 129      ( 02 Feb 2022 06:11 )             32.2    02-02    133<L>  |  99  |  18  ----------------------------<  218<H>  4.0   |  22  |  0.60    Ca    8.7      02 Feb 2022 06:09  Phos  3.2     02-01  Mg     1.9     02-01 02-01 @ 07:01  -  02-02 @ 07:00  --------------------------------------------------------  IN: 1820 mL / OUT: 950 mL / NET: 870 mL      DRAINS: None    MEDICATIONS:  Antibiotics:    Neuro:  acetaminophen     Tablet .. 650 milliGRAM(s) Oral every 6 hours PRN Temp greater or equal to 38C (100.4F), Mild Pain (1 - 3)  haloperidol    Injectable 2 milliGRAM(s) IV Push every 6 hours PRN Agitation  lacosamide Solution 100 milliGRAM(s) Oral two times a day    Cardiac:  lisinopril 10 milliGRAM(s) Oral daily    Pulm:    GI/:  bisacodyl 5 milliGRAM(s) Oral daily PRN Constipation  pantoprazole  Injectable 40 milliGRAM(s) IV Push at bedtime  polyethylene glycol 3350 17 Gram(s) Oral every 12 hours  senna 2 Tablet(s) Oral at bedtime    Other:   atorvastatin 20 milliGRAM(s) Oral at bedtime  dexAMETHasone  Injectable 3 milliGRAM(s) IV Push every 8 hours  dextrose 40% Gel 15 Gram(s) Oral once  dextrose 5%. 1000 milliLiter(s) IV Continuous <Continuous>  dextrose 5%. 1000 milliLiter(s) IV Continuous <Continuous>  dextrose 50% Injectable 50 milliLiter(s) IV Push every 15 minutes  dextrose 50% Injectable 25 milliLiter(s) IV Push every 15 minutes  dextrose 50% Injectable 12.5 Gram(s) IV Push once  dextrose 50% Injectable 25 Gram(s) IV Push once  enoxaparin Injectable 40 milliGRAM(s) SubCutaneous <User Schedule>  glucagon  Injectable 1 milliGRAM(s) IntraMuscular once  insulin lispro (ADMELOG) corrective regimen sliding scale.   SubCutaneous every 6 hours  insulin NPH human recombinant 10 Unit(s) SubCutaneous every 6 hours  sodium chloride 3 Gram(s) Oral every 6 hours  sodium chloride 0.9%. 1000 milliLiter(s) IV Continuous <Continuous>    DIET: [] Regular [] CCD [] Renal [] Puree [] Dysphagia [x] Tube Feeds: Glucerna TF    IMAGING:   < from: CT Head No Cont (01.30.22 @ 09:32) >  Postoperative changes compatible the high left frontal craniotomy is   again seen. Evaluation of the postop bed does demonstrate abnormal   low-attenuation though there are scattered areas of high attenuation   again seen which could be compatible with areas of hemorrhage at the   postop material. Extra-axial fluid/air is identified in the postoperative   region which measures approximately 1.6 cm in widest diameter. Overall   the amount of air has decreased when compared with the prior exam. This   finding previously measured approximately 1.3 cm in widest diameter. Area   of vasogenic edema around the postop region is again seen and unchanged.   Mass effect on the left lateral ventricle is again seen with   left-to-right shift at the C6 (9.6 mm).    Evaluation of the osseous structures with the appropriate window aside   from postop changes appear normal    Extra-axial soft tissue swelling and stables with associated air are seen   involving the high bifrontal region.    Left maxillary polyp versus retention cyst is seen    Both mastoid and middle ear regions appear clear.    IMPRESSION: Postop changes are again seen as described above.    --- End of Report ---    ANAY PORTILLO MD; Attending Radiologist  This documenthas been electronically signed. Jan 30 2022  9:17AM    < end of copied text >    < from: MR Head w/wo IV Cont (01.29.22 @ 12:51) >  IMPRESSION:    3.7 x 3.1 cm region of heterogeneous T1 shortening, consistent with   hemorrhagic products in the left frontal surgical bed. The presence of T1   shortening limits evaluation for the presence of residual enhancing   neoplasm.    Similar extensive T2 and FLAIR hyperintense signal surrounding the   surgical cavity, and extending into the left subinsular region, across   the anterior corpus callosum, and involving the septum pellucidum. This   may represent nonenhancing neoplasm and/or edema.    Similar abnormal T2 and FLAIR hyperintense signal along involving white   matter along the right occipital horn and ventricular atrium. While this   may represent chronic ischemic changes, the presence of nonenhancing   neoplasm should be considered.        --- End of Report ---    EDUARD WASHINGTON MD; Attending Radiologist  This document has been electronically signed. Jan 29 2022  2:36PM    < end of copied text >

## 2022-02-03 DIAGNOSIS — E87.1 HYPO-OSMOLALITY AND HYPONATREMIA: ICD-10-CM

## 2022-02-03 LAB
ANION GAP SERPL CALC-SCNC: 11 MMOL/L — SIGNIFICANT CHANGE UP (ref 5–17)
ANION GAP SERPL CALC-SCNC: 7 MMOL/L — SIGNIFICANT CHANGE UP (ref 5–17)
BUN SERPL-MCNC: 18 MG/DL — SIGNIFICANT CHANGE UP (ref 7–23)
BUN SERPL-MCNC: 24 MG/DL — HIGH (ref 7–23)
CALCIUM SERPL-MCNC: 7.2 MG/DL — LOW (ref 8.4–10.5)
CALCIUM SERPL-MCNC: 8.2 MG/DL — LOW (ref 8.4–10.5)
CHLORIDE SERPL-SCNC: 115 MMOL/L — HIGH (ref 96–108)
CHLORIDE SERPL-SCNC: 95 MMOL/L — LOW (ref 96–108)
CO2 SERPL-SCNC: 21 MMOL/L — LOW (ref 22–31)
CO2 SERPL-SCNC: 23 MMOL/L — SIGNIFICANT CHANGE UP (ref 22–31)
CREAT SERPL-MCNC: 0.65 MG/DL — SIGNIFICANT CHANGE UP (ref 0.5–1.3)
CREAT SERPL-MCNC: 0.75 MG/DL — SIGNIFICANT CHANGE UP (ref 0.5–1.3)
GLUCOSE BLDC GLUCOMTR-MCNC: 118 MG/DL — HIGH (ref 70–99)
GLUCOSE BLDC GLUCOMTR-MCNC: 138 MG/DL — HIGH (ref 70–99)
GLUCOSE BLDC GLUCOMTR-MCNC: 203 MG/DL — HIGH (ref 70–99)
GLUCOSE BLDC GLUCOMTR-MCNC: 206 MG/DL — HIGH (ref 70–99)
GLUCOSE BLDC GLUCOMTR-MCNC: 208 MG/DL — HIGH (ref 70–99)
GLUCOSE SERPL-MCNC: 167 MG/DL — HIGH (ref 70–99)
GLUCOSE SERPL-MCNC: 214 MG/DL — HIGH (ref 70–99)
POTASSIUM SERPL-MCNC: 3.5 MMOL/L — SIGNIFICANT CHANGE UP (ref 3.5–5.3)
POTASSIUM SERPL-MCNC: 4 MMOL/L — SIGNIFICANT CHANGE UP (ref 3.5–5.3)
POTASSIUM SERPL-SCNC: 3.5 MMOL/L — SIGNIFICANT CHANGE UP (ref 3.5–5.3)
POTASSIUM SERPL-SCNC: 4 MMOL/L — SIGNIFICANT CHANGE UP (ref 3.5–5.3)
SODIUM SERPL-SCNC: 127 MMOL/L — LOW (ref 135–145)
SODIUM SERPL-SCNC: 145 MMOL/L — SIGNIFICANT CHANGE UP (ref 135–145)

## 2022-02-03 PROCEDURE — 99233 SBSQ HOSP IP/OBS HIGH 50: CPT

## 2022-02-03 PROCEDURE — 99232 SBSQ HOSP IP/OBS MODERATE 35: CPT

## 2022-02-03 RX ORDER — SODIUM CHLORIDE 5 G/100ML
1000 INJECTION, SOLUTION INTRAVENOUS
Refills: 0 | Status: DISCONTINUED | OUTPATIENT
Start: 2022-02-03 | End: 2022-02-03

## 2022-02-03 RX ORDER — INSULIN GLARGINE 100 [IU]/ML
15 INJECTION, SOLUTION SUBCUTANEOUS AT BEDTIME
Refills: 0 | Status: DISCONTINUED | OUTPATIENT
Start: 2022-02-03 | End: 2022-02-04

## 2022-02-03 RX ORDER — POTASSIUM CHLORIDE 20 MEQ
40 PACKET (EA) ORAL ONCE
Refills: 0 | Status: COMPLETED | OUTPATIENT
Start: 2022-02-03 | End: 2022-02-04

## 2022-02-03 RX ORDER — INSULIN LISPRO 100/ML
10 VIAL (ML) SUBCUTANEOUS
Refills: 0 | Status: DISCONTINUED | OUTPATIENT
Start: 2022-02-03 | End: 2022-02-04

## 2022-02-03 RX ORDER — SODIUM CHLORIDE 9 MG/ML
2 INJECTION INTRAMUSCULAR; INTRAVENOUS; SUBCUTANEOUS EVERY 8 HOURS
Refills: 0 | Status: DISCONTINUED | OUTPATIENT
Start: 2022-02-03 | End: 2022-02-04

## 2022-02-03 RX ORDER — LISINOPRIL 2.5 MG/1
5 TABLET ORAL DAILY
Refills: 0 | Status: DISCONTINUED | OUTPATIENT
Start: 2022-02-04 | End: 2022-02-07

## 2022-02-03 RX ADMIN — ENOXAPARIN SODIUM 40 MILLIGRAM(S): 100 INJECTION SUBCUTANEOUS at 17:19

## 2022-02-03 RX ADMIN — Medication 650 MILLIGRAM(S): at 19:28

## 2022-02-03 RX ADMIN — Medication 3 MILLIGRAM(S): at 05:52

## 2022-02-03 RX ADMIN — Medication 10 UNIT(S): at 17:16

## 2022-02-03 RX ADMIN — LISINOPRIL 10 MILLIGRAM(S): 2.5 TABLET ORAL at 05:53

## 2022-02-03 RX ADMIN — SENNA PLUS 2 TABLET(S): 8.6 TABLET ORAL at 22:08

## 2022-02-03 RX ADMIN — ATORVASTATIN CALCIUM 20 MILLIGRAM(S): 80 TABLET, FILM COATED ORAL at 22:05

## 2022-02-03 RX ADMIN — POLYETHYLENE GLYCOL 3350 17 GRAM(S): 17 POWDER, FOR SOLUTION ORAL at 17:18

## 2022-02-03 RX ADMIN — SODIUM CHLORIDE 3 GRAM(S): 9 INJECTION INTRAMUSCULAR; INTRAVENOUS; SUBCUTANEOUS at 05:53

## 2022-02-03 RX ADMIN — POLYETHYLENE GLYCOL 3350 17 GRAM(S): 17 POWDER, FOR SOLUTION ORAL at 05:52

## 2022-02-03 RX ADMIN — SODIUM CHLORIDE 3 GRAM(S): 9 INJECTION INTRAMUSCULAR; INTRAVENOUS; SUBCUTANEOUS at 17:15

## 2022-02-03 RX ADMIN — SODIUM CHLORIDE 50 MILLILITER(S): 5 INJECTION, SOLUTION INTRAVENOUS at 11:32

## 2022-02-03 RX ADMIN — Medication 4: at 09:29

## 2022-02-03 RX ADMIN — PANTOPRAZOLE SODIUM 40 MILLIGRAM(S): 20 TABLET, DELAYED RELEASE ORAL at 22:05

## 2022-02-03 RX ADMIN — SODIUM CHLORIDE 2 GRAM(S): 9 INJECTION INTRAMUSCULAR; INTRAVENOUS; SUBCUTANEOUS at 22:05

## 2022-02-03 RX ADMIN — Medication 8 UNIT(S): at 09:28

## 2022-02-03 RX ADMIN — Medication 3 MILLIGRAM(S): at 14:30

## 2022-02-03 RX ADMIN — INSULIN GLARGINE 15 UNIT(S): 100 INJECTION, SOLUTION SUBCUTANEOUS at 22:07

## 2022-02-03 RX ADMIN — SODIUM CHLORIDE 3 GRAM(S): 9 INJECTION INTRAMUSCULAR; INTRAVENOUS; SUBCUTANEOUS at 11:32

## 2022-02-03 RX ADMIN — Medication 3 MILLIGRAM(S): at 22:05

## 2022-02-03 RX ADMIN — Medication 650 MILLIGRAM(S): at 18:58

## 2022-02-03 RX ADMIN — LACOSAMIDE 100 MILLIGRAM(S): 50 TABLET ORAL at 17:22

## 2022-02-03 RX ADMIN — LACOSAMIDE 100 MILLIGRAM(S): 50 TABLET ORAL at 05:59

## 2022-02-03 NOTE — PROGRESS NOTE ADULT - PROBLEM SELECTOR PLAN 7
DVT: lovenox. LE duplex on 1/26 negative for DVT.  bowel regimen- monitor BM. last documented on 1/31. If no BM today, consider mg citrate.  Dispo: acute rehab.

## 2022-02-03 NOTE — PROGRESS NOTE ADULT - PROBLEM SELECTOR PLAN 1
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

## 2022-02-03 NOTE — PROGRESS NOTE ADULT - PROBLEM SELECTOR PLAN 3
On lisinopril 2.5 mg daily at home  - c/w lisinopril 10 mg daily and monitor BP closely. If BP trends down, decrease lisinopril to 5mg. On lisinopril 2.5 mg daily at home  - decrease lisinopril from 10 mg to 5 mg daily given SBP trend  - monitor BP

## 2022-02-03 NOTE — PROGRESS NOTE ADULT - ASSESSMENT
66M with hx of T2DM, HTN, HLD a/w large left frontal brain mass with cerebral edema and brain compression concerning for GBM s/p left crani for resection with Gleolan on 1/27. Course c/b hyponatremia, steroid induced hyperglycemia.

## 2022-02-03 NOTE — PROGRESS NOTE ADULT - SUBJECTIVE AND OBJECTIVE BOX
[facility] Division of Hospital Medicine  Felicia Salas MD  spectra [id number]    Patient is a 66y old  Male who presents with a chief complaint of brain mass (02 Feb 2022 11:41)      SUBJECTIVE / OVERNIGHT EVENTS: patient seen and examined earlier today. he endorses HA but otherwise doing ok. Denies SOB. He is eating and drinking ok. per staff, he has been calm and off restraints now.   ADDITIONAL REVIEW OF SYSTEMS:    MEDICATIONS  (STANDING):  atorvastatin 20 milliGRAM(s) Oral at bedtime  dexAMETHasone  Injectable 3 milliGRAM(s) IV Push every 8 hours  dextrose 40% Gel 15 Gram(s) Oral once  dextrose 5%. 1000 milliLiter(s) (50 mL/Hr) IV Continuous <Continuous>  dextrose 5%. 1000 milliLiter(s) (100 mL/Hr) IV Continuous <Continuous>  dextrose 50% Injectable 50 milliLiter(s) IV Push every 15 minutes  dextrose 50% Injectable 25 milliLiter(s) IV Push every 15 minutes  dextrose 50% Injectable 12.5 Gram(s) IV Push once  dextrose 50% Injectable 25 Gram(s) IV Push once  enoxaparin Injectable 40 milliGRAM(s) SubCutaneous <User Schedule>  glucagon  Injectable 1 milliGRAM(s) IntraMuscular once  insulin glargine Injectable (LANTUS) 15 Unit(s) SubCutaneous at bedtime  insulin lispro (ADMELOG) corrective regimen sliding scale   SubCutaneous three times a day before meals  insulin lispro (ADMELOG) corrective regimen sliding scale   SubCutaneous at bedtime  insulin lispro Injectable (ADMELOG) 10 Unit(s) SubCutaneous three times a day before meals  lacosamide Solution 100 milliGRAM(s) Oral two times a day  lisinopril 10 milliGRAM(s) Oral daily  pantoprazole  Injectable 40 milliGRAM(s) IV Push at bedtime  polyethylene glycol 3350 17 Gram(s) Oral every 12 hours  senna 2 Tablet(s) Oral at bedtime  sodium chloride 3 Gram(s) Oral every 6 hours  sodium chloride 2% . 1000 milliLiter(s) (50 mL/Hr) IV Continuous <Continuous>    MEDICATIONS  (PRN):  acetaminophen     Tablet .. 650 milliGRAM(s) Oral every 6 hours PRN Temp greater or equal to 38C (100.4F), Mild Pain (1 - 3)  bisacodyl 5 milliGRAM(s) Oral daily PRN Constipation      CAPILLARY BLOOD GLUCOSE      POCT Blood Glucose.: 206 mg/dL (03 Feb 2022 11:53)  POCT Blood Glucose.: 208 mg/dL (03 Feb 2022 09:20)  POCT Blood Glucose.: 203 mg/dL (03 Feb 2022 07:35)  POCT Blood Glucose.: 103 mg/dL (02 Feb 2022 21:36)  POCT Blood Glucose.: 101 mg/dL (02 Feb 2022 16:23)    I&O's Summary    02 Feb 2022 07:01  -  03 Feb 2022 07:00  --------------------------------------------------------  IN: 240 mL / OUT: 1350 mL / NET: -1110 mL        PHYSICAL EXAM:  Vital Signs Last 24 Hrs  T(C): 36.7 (03 Feb 2022 11:39), Max: 37 (03 Feb 2022 07:38)  T(F): 98.1 (03 Feb 2022 11:39), Max: 98.6 (03 Feb 2022 07:38)  HR: 63 (03 Feb 2022 11:39) (54 - 76)  BP: 113/65 (03 Feb 2022 11:39) (108/65 - 128/80)  BP(mean): --  RR: 18 (03 Feb 2022 11:39) (17 - 18)  SpO2: 98% (03 Feb 2022 11:39) (94% - 98%)    CONSTITUTIONAL: NAD, well groomed  HEAD: staples across head, no sign of wound infection  EYES: EOMI, PERRL  NECK: Supple, no palpable masses; no thyromegaly  RESPIRATORY: CTABL, no wheezing or respiratory distress  CARDIOVASCULAR: normal S1 and S2, no murmur/rub/gallop; No lower extremity edema  ABDOMEN: soft, nt, nd, normoactive bowel sounds  MUSCULOSKELETAL: no clubbing or cyanosis of digits; no joint swelling or tenderness to palpation  PSYCH: AOx person, states he is in [place], thought it was 2017 but knew it was Thursday  NEUROLOGY: follows simple commands, moves all extremities  SKIN: No rashes; no palpable lesions    LABS:                        11.0   8.71  )-----------( 129      ( 02 Feb 2022 06:11 )             32.2     02-03    127<L>  |  95<L>  |  24<H>  ----------------------------<  214<H>  4.0   |  21<L>  |  0.75    Ca    8.2<L>      03 Feb 2022 07:01                  RADIOLOGY & ADDITIONAL TESTS:  Results Reviewed:   Imaging Personally Reviewed:  Electrocardiogram Personally Reviewed:    COORDINATION OF CARE:  Care Discussed with Consultants/Other Providers [Y/N]: neurosurgery PA(Roxane)  Prior or Outpatient Records Reviewed [Y/N]:

## 2022-02-03 NOTE — PROGRESS NOTE ADULT - ASSESSMENT
ASSESSMENT AND PLAN: 66M hx HTN/DM [place] early [place], was confused and not answering appropriately, MRI Jan 7 showed L frontal enhancing mass with significant edema. Repeat CTH today showed significant vasogenic edema and 1.5cm    s/p left craniotomy for resection of brain tumor (w/ Gleolan) POD 7    NEURO:   - Continue neuro checks q 4  - F/u surgical pathology  - Continue Decadron for brain tumor and cerebral edema, currently on 3mg q 8hours  - Continue Vimpat for seizure prophylaxis (was on EEG when he was in [place] - revealed no seizures)  - Continue pain control with tylenol prn  - Was on Haldol 2mg IVP for prn agitation - d/c'd this morning, no Haldol prn given for more than 24hrs  - PT/OT/PMR for Acute Rehab    PULM:   - On room air, O2Sat>94%  - Incentive spirometry    CV:  - -160  - Continue Lisinopril for HTN  - Continue Lipitor for HLD    ENDO:   - HgbA1c 10.7  - Resolved hypglycemia, current FS in 200's  - Current regimen ISS, Lantus 12, Admelog 8 TID w/ CCD  - Will continue to monitor fingersticks    HEME/ONC:             Last CBC 2/2 stable         DVT ppx: SQL, SCDs, [place] 1/26 - negative    RENAL:   - Hyponatremic this morning Na at 127, corrected due to hyperglycemia corrected is 130  - Placed patient on 2%NACL@50 - with BMP q 6 - goal 135-140  - Currently on salt tabs 3g q 6    ID:   - Afebrile  - 1/26 COVID neg    GI:    - S&S following, diet advanced yesterday  - Continue senna, miralax, and dulcolax, last BM 1/31    DISCHARGE PLANNING:   PT/OT/PMR - Acute Rehab, daughter Roxanne currently given options for placement for rehab    Plan to be discussed w/ Dr. Gupta  [id number]

## 2022-02-03 NOTE — PROGRESS NOTE ADULT - SUBJECTIVE AND OBJECTIVE BOX
SUBJECTIVE: HPI:  66M hx HTN/DM was [place] early January, was confused and not answering appropriately, MRI Jan 7 showed L frontal enhancing mass with significant edema. Repeat CTH today showed significant vasogenic edema and 1.5cm MLS. Exam: Awake, Ox1, answering many questions inappropriately/perseverating, EOMI, R drift, DUMONT strongl (24 Jan 2022 20:04)      OVERNIGHT EVENTS: Patient moved to observation room overnight due to impulsivity, currently only on vest restraints, wrist restraints d/c'd yesterday. Patient seen and evaluated at bedside comfortable, daughter Roxanne at the bedside - gave updates and answered questions.     Vital Signs Last 24 Hrs  T(C): 37 (03 Feb 2022 07:38), Max: 37 (03 Feb 2022 07:38)  T(F): 98.6 (03 Feb 2022 07:38), Max: 98.6 (03 Feb 2022 07:38)  HR: 54 (03 Feb 2022 07:38) (54 - 76)  BP: 119/68 (03 Feb 2022 07:38) (108/65 - 131/78)  BP(mean): --  RR: 17 (03 Feb 2022 07:38) (17 - 18)  SpO2: 94% (03 Feb 2022 07:38) (94% - 98%)    PHYSICAL EXAM:    General: No Acute Distress     Neurological: Awake, OX2 (name, month), EOMI / PERRL, following commands, RUE drift, uppers 5/5, lowers 5/5    Pulmonary: Clear to Auscultation, No Rales, No Rhonchi, No Wheezes     Cardiovascular: S1, S2, Regular Rate and Rhythm     Gastrointestinal: Soft, Nontender, Nondistended     Incision: Crani staples in place C/D/I    LABS:                        11.0   8.71  )-----------( 129      ( 02 Feb 2022 06:11 )             32.2    02-03    127<L>  |  95<L>  |  24<H>  ----------------------------<  214<H>  4.0   |  21<L>  |  0.75    Ca    8.2<L>      03 Feb 2022 07:01          02-02 @ 07:01  -  02-03 @ 07:00  --------------------------------------------------------  IN: 240 mL / OUT: 1350 mL / NET: -1110 mL      DRAINS: None    MEDICATIONS:  Antibiotics:    Neuro:  acetaminophen     Tablet .. 650 milliGRAM(s) Oral every 6 hours PRN Temp greater or equal to 38C (100.4F), Mild Pain (1 - 3)  haloperidol    Injectable 2 milliGRAM(s) IV Push every 6 hours PRN Agitation  lacosamide Solution 100 milliGRAM(s) Oral two times a day    Cardiac:  lisinopril 10 milliGRAM(s) Oral daily    Pulm:    GI/:  bisacodyl 5 milliGRAM(s) Oral daily PRN Constipation  pantoprazole  Injectable 40 milliGRAM(s) IV Push at bedtime  polyethylene glycol 3350 17 Gram(s) Oral every 12 hours  senna 2 Tablet(s) Oral at bedtime    Other:   atorvastatin 20 milliGRAM(s) Oral at bedtime  dexAMETHasone  Injectable 3 milliGRAM(s) IV Push every 8 hours  dextrose 40% Gel 15 Gram(s) Oral once  dextrose 5%. 1000 milliLiter(s) IV Continuous <Continuous>  dextrose 5%. 1000 milliLiter(s) IV Continuous <Continuous>  dextrose 50% Injectable 50 milliLiter(s) IV Push every 15 minutes  dextrose 50% Injectable 25 milliLiter(s) IV Push every 15 minutes  dextrose 50% Injectable 12.5 Gram(s) IV Push once  dextrose 50% Injectable 25 Gram(s) IV Push once  enoxaparin Injectable 40 milliGRAM(s) SubCutaneous <User Schedule>  glucagon  Injectable 1 milliGRAM(s) IntraMuscular once  insulin glargine Injectable (LANTUS) 12 Unit(s) SubCutaneous at bedtime  insulin lispro (ADMELOG) corrective regimen sliding scale   SubCutaneous three times a day before meals  insulin lispro (ADMELOG) corrective regimen sliding scale   SubCutaneous at bedtime  insulin lispro Injectable (ADMELOG) 8 Unit(s) SubCutaneous three times a day before meals  sodium chloride 3 Gram(s) Oral every 6 hours  sodium chloride 2% . 1000 milliLiter(s) IV Continuous <Continuous>    DIET: [] Regular [x] CCD [] Renal [] Puree [] Dysphagia [] Tube Feeds:     IMAGING:   < from: MR Head w/wo IV Cont (01.29.22 @ 12:51) >  IMPRESSION:    3.7 x 3.1 cm region of heterogeneous T1 shortening, consistent with   hemorrhagic products in the left frontal surgical bed. The presence of T1   shortening limits evaluation for the presence of residual enhancing   neoplasm.    Similar extensive T2 and FLAIR hyperintense signal surrounding the   surgical cavity, and extending into the left subinsular region, across   the anterior corpus callosum, and involving the septum pellucidum. This   may represent nonenhancing neoplasm and/or edema.    Similar abnormal T2 and FLAIR hyperintense signal along involving white   matter along the right occipital horn and ventricular atrium. While this   may represent chronic ischemic changes, the presence of nonenhancing   neoplasm should be considered.        --- End of Report ---      EDUARD WASHINGTON MD; Attending Radiologist  This document has been electronically signed. Jan 29 2022  2:36PM    < end of copied text >

## 2022-02-03 NOTE — PROGRESS NOTE ADULT - PROBLEM SELECTOR PLAN 4
On metformin 1000mg bid at home; last a1c 10.7 on this admission  - patient passed S&S and started on PO diet  - increase lantus 15u, premeal 10 units with medium-intensity sliding scale  - Will titrate insulin depending on FS trend  - FS/MISS qac/qhs  - Monitor for hyperglycemia while on steroids.

## 2022-02-04 LAB
ANION GAP SERPL CALC-SCNC: 11 MMOL/L — SIGNIFICANT CHANGE UP (ref 5–17)
ANION GAP SERPL CALC-SCNC: 9 MMOL/L — SIGNIFICANT CHANGE UP (ref 5–17)
BUN SERPL-MCNC: 21 MG/DL — SIGNIFICANT CHANGE UP (ref 7–23)
BUN SERPL-MCNC: 21 MG/DL — SIGNIFICANT CHANGE UP (ref 7–23)
CALCIUM SERPL-MCNC: 8.1 MG/DL — LOW (ref 8.4–10.5)
CALCIUM SERPL-MCNC: 8.6 MG/DL — SIGNIFICANT CHANGE UP (ref 8.4–10.5)
CHLORIDE SERPL-SCNC: 99 MMOL/L — SIGNIFICANT CHANGE UP (ref 96–108)
CHLORIDE SERPL-SCNC: 99 MMOL/L — SIGNIFICANT CHANGE UP (ref 96–108)
CO2 SERPL-SCNC: 21 MMOL/L — LOW (ref 22–31)
CO2 SERPL-SCNC: 23 MMOL/L — SIGNIFICANT CHANGE UP (ref 22–31)
CREAT SERPL-MCNC: 0.7 MG/DL — SIGNIFICANT CHANGE UP (ref 0.5–1.3)
CREAT SERPL-MCNC: 0.71 MG/DL — SIGNIFICANT CHANGE UP (ref 0.5–1.3)
GLUCOSE BLDC GLUCOMTR-MCNC: 111 MG/DL — HIGH (ref 70–99)
GLUCOSE BLDC GLUCOMTR-MCNC: 171 MG/DL — HIGH (ref 70–99)
GLUCOSE BLDC GLUCOMTR-MCNC: 179 MG/DL — HIGH (ref 70–99)
GLUCOSE BLDC GLUCOMTR-MCNC: 242 MG/DL — HIGH (ref 70–99)
GLUCOSE SERPL-MCNC: 150 MG/DL — HIGH (ref 70–99)
GLUCOSE SERPL-MCNC: 273 MG/DL — HIGH (ref 70–99)
POTASSIUM SERPL-MCNC: 3.9 MMOL/L — SIGNIFICANT CHANGE UP (ref 3.5–5.3)
POTASSIUM SERPL-MCNC: 4.2 MMOL/L — SIGNIFICANT CHANGE UP (ref 3.5–5.3)
POTASSIUM SERPL-SCNC: 3.9 MMOL/L — SIGNIFICANT CHANGE UP (ref 3.5–5.3)
POTASSIUM SERPL-SCNC: 4.2 MMOL/L — SIGNIFICANT CHANGE UP (ref 3.5–5.3)
SARS-COV-2 RNA SPEC QL NAA+PROBE: SIGNIFICANT CHANGE UP
SODIUM SERPL-SCNC: 130 MMOL/L — LOW (ref 135–145)
SODIUM SERPL-SCNC: 133 MMOL/L — LOW (ref 135–145)

## 2022-02-04 PROCEDURE — 99232 SBSQ HOSP IP/OBS MODERATE 35: CPT

## 2022-02-04 RX ORDER — INSULIN GLARGINE 100 [IU]/ML
16 INJECTION, SOLUTION SUBCUTANEOUS AT BEDTIME
Refills: 0 | Status: DISCONTINUED | OUTPATIENT
Start: 2022-02-04 | End: 2022-02-05

## 2022-02-04 RX ORDER — DEXAMETHASONE 0.5 MG/5ML
2 ELIXIR ORAL EVERY 8 HOURS
Refills: 0 | Status: DISCONTINUED | OUTPATIENT
Start: 2022-02-04 | End: 2022-02-07

## 2022-02-04 RX ORDER — SODIUM CHLORIDE 9 MG/ML
2 INJECTION INTRAMUSCULAR; INTRAVENOUS; SUBCUTANEOUS EVERY 6 HOURS
Refills: 0 | Status: DISCONTINUED | OUTPATIENT
Start: 2022-02-04 | End: 2022-02-08

## 2022-02-04 RX ORDER — INSULIN LISPRO 100/ML
12 VIAL (ML) SUBCUTANEOUS
Refills: 0 | Status: DISCONTINUED | OUTPATIENT
Start: 2022-02-04 | End: 2022-02-04

## 2022-02-04 RX ORDER — INSULIN LISPRO 100/ML
12 VIAL (ML) SUBCUTANEOUS
Refills: 0 | Status: DISCONTINUED | OUTPATIENT
Start: 2022-02-04 | End: 2022-02-05

## 2022-02-04 RX ORDER — LACOSAMIDE 50 MG/1
100 TABLET ORAL
Refills: 0 | Status: DISCONTINUED | OUTPATIENT
Start: 2022-02-04 | End: 2022-02-08

## 2022-02-04 RX ADMIN — SODIUM CHLORIDE 2 GRAM(S): 9 INJECTION INTRAMUSCULAR; INTRAVENOUS; SUBCUTANEOUS at 11:14

## 2022-02-04 RX ADMIN — ENOXAPARIN SODIUM 40 MILLIGRAM(S): 100 INJECTION SUBCUTANEOUS at 17:03

## 2022-02-04 RX ADMIN — Medication 10 UNIT(S): at 12:24

## 2022-02-04 RX ADMIN — INSULIN GLARGINE 16 UNIT(S): 100 INJECTION, SOLUTION SUBCUTANEOUS at 21:31

## 2022-02-04 RX ADMIN — POLYETHYLENE GLYCOL 3350 17 GRAM(S): 17 POWDER, FOR SOLUTION ORAL at 05:34

## 2022-02-04 RX ADMIN — POLYETHYLENE GLYCOL 3350 17 GRAM(S): 17 POWDER, FOR SOLUTION ORAL at 17:04

## 2022-02-04 RX ADMIN — ATORVASTATIN CALCIUM 20 MILLIGRAM(S): 80 TABLET, FILM COATED ORAL at 21:31

## 2022-02-04 RX ADMIN — LACOSAMIDE 100 MILLIGRAM(S): 50 TABLET ORAL at 05:35

## 2022-02-04 RX ADMIN — Medication 650 MILLIGRAM(S): at 01:00

## 2022-02-04 RX ADMIN — SODIUM CHLORIDE 2 GRAM(S): 9 INJECTION INTRAMUSCULAR; INTRAVENOUS; SUBCUTANEOUS at 23:01

## 2022-02-04 RX ADMIN — Medication 10 MILLIGRAM(S): at 12:29

## 2022-02-04 RX ADMIN — Medication 2: at 08:18

## 2022-02-04 RX ADMIN — Medication 2 MILLIGRAM(S): at 13:33

## 2022-02-04 RX ADMIN — SODIUM CHLORIDE 2 GRAM(S): 9 INJECTION INTRAMUSCULAR; INTRAVENOUS; SUBCUTANEOUS at 17:04

## 2022-02-04 RX ADMIN — Medication 12 UNIT(S): at 17:04

## 2022-02-04 RX ADMIN — LACOSAMIDE 100 MILLIGRAM(S): 50 TABLET ORAL at 17:03

## 2022-02-04 RX ADMIN — Medication 4: at 12:22

## 2022-02-04 RX ADMIN — LISINOPRIL 5 MILLIGRAM(S): 2.5 TABLET ORAL at 05:35

## 2022-02-04 RX ADMIN — Medication 2 MILLIGRAM(S): at 21:31

## 2022-02-04 RX ADMIN — Medication 10 UNIT(S): at 08:31

## 2022-02-04 RX ADMIN — Medication 650 MILLIGRAM(S): at 01:30

## 2022-02-04 RX ADMIN — Medication 40 MILLIEQUIVALENT(S): at 05:34

## 2022-02-04 RX ADMIN — SODIUM CHLORIDE 2 GRAM(S): 9 INJECTION INTRAMUSCULAR; INTRAVENOUS; SUBCUTANEOUS at 05:35

## 2022-02-04 RX ADMIN — PANTOPRAZOLE SODIUM 40 MILLIGRAM(S): 20 TABLET, DELAYED RELEASE ORAL at 21:30

## 2022-02-04 RX ADMIN — Medication 3 MILLIGRAM(S): at 05:35

## 2022-02-04 NOTE — PROGRESS NOTE ADULT - ASSESSMENT
HPI:  66M hx HTN/DM was [place] for [place] early January, was confused and not answering appropriately, MRI Jan 7 showed L frontal enhancing mass with significant edema. Repeat CTH today showed significant vasogenic edema and 1.5cm MLS. Exam: Awake, Ox1, answering many questions inappropriately/perseverating, EOMI, R drift, JULIA rosas (24 Jan 2022 20:04)    PROCEDURE: Craniotomy, with supratentorial neoplasm excision     s/p left craniotomy and resection of left frontal mass on 1/27     PLAN:  Neuro:   1 Out of bed   2 continue pt/ot   3 vimpat 100 bid to prevent seizure   4 decadron 2q8 to control cerebral edema from tumor burden     Respiratory:   1 room air   2 incentive spirometry     CV:  1 bp stable   2 continue lisinopril     Endocrine:   1 diabetes melitus   Heme/Onc:             DVT ppx:   Renal:   ID:   GI:   Social/Family:   Discharge planning:      HPI:  66M hx HTN/DM was in the [place] for vacation early January, was confused and not answering appropriately, MRI Jan 7 showed L frontal enhancing mass with significant edema. Repeat CTH today showed significant vasogenic edema and 1.5cm MLS. Exam: Awake, Ox1, answering many questions inappropriately/perseverating, EOMI, R drift, JULIA rosas (24 Jan 2022 20:04)    PROCEDURE: Craniotomy, with supratentorial neoplasm excision     s/p left craniotomy and resection of left frontal mass on 1/27     PLAN:  Neuro:   1 Out of bed   2 continue pt/ot   3 vimpat 100 bid to prevent seizure   4 decadron 2q8 to control cerebral edema from tumor burden     Respiratory:   1 room air   2 incentive spirometry     CV:  1 bp stable   2 continue lisinopril     Endocrine:   1 diabetes melitus -continue on lantus, premeal admelog and sliding scale     Heme/Onc:             DVT ppx: sql and scds   1 patient will need neuro onc as outpatient     Renal:   1 voiding     ID:   1 afebrile     GI:   1 ccd diet   2 continue miralax and senna     Social/Family:   Discharge planning: rehab :p   covid sent     -will discuss with Dr. Gupta   -spectra [phone]

## 2022-02-04 NOTE — PROGRESS NOTE ADULT - SUBJECTIVE AND OBJECTIVE BOX
SUBJECTIVE:   Patient was seen and evaluated at bedside. Patient is resting in bed and is in no new acute distress.   OVERNIGHT EVENTS:   none   Vital Signs Last 24 Hrs  T(C): 36.7 (04 Feb 2022 13:00), Max: 36.9 (03 Feb 2022 19:19)  T(F): 98 (04 Feb 2022 13:00), Max: 98.4 (03 Feb 2022 19:19)  HR: 58 (04 Feb 2022 13:00) (57 - 78)  BP: 111/67 (04 Feb 2022 13:00) (106/67 - 111/70)  BP(mean): --  RR: 18 (04 Feb 2022 13:00) (18 - 18)  SpO2: 96% (04 Feb 2022 13:00) (94% - 98%)    PHYSICAL EXAM:    General: No Acute Distress     Neurological: Awake, alert oriented to person, place and time, Following Commands, PERRL, EOMI, Face Symmetrical, Speech Fluent, Moving all extremities, Muscle Strength normal in all four extremities, right upper pronator  Drift, Sensation to Light Touch Intact    Pulmonary: Clear to Auscultation, No Rales, No Rhonchi, No Wheezes     Cardiovascular: S1, S2, Regular Rate and Rhythm     Gastrointestinal: Soft, Nontender, Nondistended     Incision: clean and dry     LABS:   02-04    130<L>  |  99  |  21  ----------------------------<  273<H>  4.2   |  21<L>  |  0.70    Ca    8.6      04 Feb 2022 12:20          02-03 @ 07:01  -  02-04 @ 07:00  --------------------------------------------------------  IN: 0 mL / OUT: 350 mL / NET: -350 mL      DRAINS:     MEDICATIONS:  Antibiotics:    Neuro:  acetaminophen     Tablet .. 650 milliGRAM(s) Oral every 6 hours PRN Temp greater or equal to 38C (100.4F), Mild Pain (1 - 3)  lacosamide Solution 100 milliGRAM(s) Oral two times a day    Cardiac:  lisinopril 5 milliGRAM(s) Oral daily    Pulm:    GI/:  bisacodyl 5 milliGRAM(s) Oral daily PRN Constipation  pantoprazole  Injectable 40 milliGRAM(s) IV Push at bedtime  polyethylene glycol 3350 17 Gram(s) Oral every 12 hours  senna 2 Tablet(s) Oral at bedtime    Other:   atorvastatin 20 milliGRAM(s) Oral at bedtime  dexAMETHasone  Injectable 2 milliGRAM(s) IV Push every 8 hours  dextrose 40% Gel 15 Gram(s) Oral once  dextrose 5%. 1000 milliLiter(s) IV Continuous <Continuous>  dextrose 5%. 1000 milliLiter(s) IV Continuous <Continuous>  dextrose 50% Injectable 50 milliLiter(s) IV Push every 15 minutes  dextrose 50% Injectable 25 milliLiter(s) IV Push every 15 minutes  dextrose 50% Injectable 12.5 Gram(s) IV Push once  dextrose 50% Injectable 25 Gram(s) IV Push once  enoxaparin Injectable 40 milliGRAM(s) SubCutaneous <User Schedule>  glucagon  Injectable 1 milliGRAM(s) IntraMuscular once  insulin glargine Injectable (LANTUS) 15 Unit(s) SubCutaneous at bedtime  insulin lispro (ADMELOG) corrective regimen sliding scale   SubCutaneous three times a day before meals  insulin lispro (ADMELOG) corrective regimen sliding scale   SubCutaneous at bedtime  insulin lispro Injectable (ADMELOG) 10 Unit(s) SubCutaneous three times a day before meals  sodium chloride 2 Gram(s) Oral every 6 hours    DIET: [] Regular [] CCD [] Renal [] Puree [] Dysphagia [] Tube Feeds:   ccd diet   IMAGING:   ACC: [id number] EXAM:  CT BRAIN                          PROCEDURE DATE:  01/30/2022          INTERPRETATION:  Clinical indications: Status post surgery.    Multiple axial sections were performed from base skull to vertex without   contrast enhancement.    Postoperative changes compatible the high left frontal craniotomy is   again seen. Evaluation of the postop bed does demonstrate abnormal   low-attenuation though there are scattered areas of high attenuation   again seen which could be compatible with areas of hemorrhage at the   postop material. Extra-axial fluid/air is identified in the postoperative   region which measures approximately 1.6 cm in widest diameter. Overall   the amount of air has decreased when compared with the prior exam. This   finding previously measured approximately 1.3 cm in widest diameter. Area   of vasogenic edema around the postop region is again seen and unchanged.   Mass effect on the left lateral ventricle is again seen with   left-to-right shift at the C6 (9.6 mm).    Evaluation of the osseous structures with the appropriate window aside   from postop changes appear normal    Extra-axial soft tissue swelling and stables with associated air are seen   involving the high bifrontal region.    Left maxillary polyp versus retention cyst is seen    Both mastoid and middle ear regions appear clear.    IMPRESSION: Postop changes are again seen as described above.    --- End of Report ---

## 2022-02-04 NOTE — PROGRESS NOTE ADULT - PROBLEM SELECTOR PLAN 1
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

## 2022-02-04 NOTE — PROGRESS NOTE ADULT - PROBLEM SELECTOR PLAN 4
On metformin 1000mg bid at home; last a1c 10.7 on this admission  - patient passed S&S and started on PO diet  - c/w lantus 15u, premeal 10 units with medium-intensity sliding scale  - Will titrate insulin depending on FS trend  - FS/MISS qac/qhs  - Monitor for hyperglycemia while on steroids On metformin 1000mg bid at home; last a1c 10.7 on this admission  - patient passed S&S and started on PO diet  - increase lantus 16units qHS  - increase premeal 12 units with medium-intensity sliding scale  - Will titrate insulin depending on FS trend  - FS/MISS qac/qhs  - Monitor for hyperglycemia while on steroids

## 2022-02-04 NOTE — PROGRESS NOTE ADULT - PROBLEM SELECTOR PLAN 7
DVT: lovenox. LE duplex on 1/26 negative for DVT.  bowel regimen: monitor BM. last documented on 1/31. ordered dulcolax suppository x 1 now.  Dispo: acute rehab

## 2022-02-04 NOTE — PROGRESS NOTE ADULT - PROBLEM SELECTOR PLAN 3
On lisinopril 2.5 mg daily at home  - c/w lisinopril 5mg daily (decreased started today 2/4) with hold parameters  - if SBP persistently below<110, may need to decrease back to home dose of 2.5mg daily  - will monitor BP throughout the day  - monitor vitals

## 2022-02-04 NOTE — PROGRESS NOTE ADULT - SUBJECTIVE AND OBJECTIVE BOX
Patient is a 66y old  Male who presents with a chief complaint of GBM (01 Feb 2022 01:36)    Patient tolerating therapies.  Min A transfers; CS gait.  Reports HA- 5/10 in severity.   No CP, No SOB  Daughter at bedside.    MEDICATIONS  (STANDING):  atorvastatin 20 milliGRAM(s) Oral at bedtime  dexAMETHasone  Injectable 2 milliGRAM(s) IV Push every 8 hours  dextrose 40% Gel 15 Gram(s) Oral once  dextrose 5%. 1000 milliLiter(s) (50 mL/Hr) IV Continuous <Continuous>  dextrose 5%. 1000 milliLiter(s) (100 mL/Hr) IV Continuous <Continuous>  dextrose 50% Injectable 50 milliLiter(s) IV Push every 15 minutes  dextrose 50% Injectable 25 milliLiter(s) IV Push every 15 minutes  dextrose 50% Injectable 12.5 Gram(s) IV Push once  dextrose 50% Injectable 25 Gram(s) IV Push once  enoxaparin Injectable 40 milliGRAM(s) SubCutaneous <User Schedule>  glucagon  Injectable 1 milliGRAM(s) IntraMuscular once  insulin glargine Injectable (LANTUS) 15 Unit(s) SubCutaneous at bedtime  insulin lispro (ADMELOG) corrective regimen sliding scale   SubCutaneous three times a day before meals  insulin lispro (ADMELOG) corrective regimen sliding scale   SubCutaneous at bedtime  insulin lispro Injectable (ADMELOG) 10 Unit(s) SubCutaneous three times a day before meals  lacosamide Solution 100 milliGRAM(s) Oral two times a day  lisinopril 5 milliGRAM(s) Oral daily  pantoprazole  Injectable 40 milliGRAM(s) IV Push at bedtime  polyethylene glycol 3350 17 Gram(s) Oral every 12 hours  senna 2 Tablet(s) Oral at bedtime  sodium chloride 2 Gram(s) Oral every 6 hours    MEDICATIONS  (PRN):  acetaminophen     Tablet .. 650 milliGRAM(s) Oral every 6 hours PRN Temp greater or equal to 38C (100.4F), Mild Pain (1 - 3)  bisacodyl 5 milliGRAM(s) Oral daily PRN Constipation    Vital Signs Last 24 Hrs  T(C): 36.8 (04 Feb 2022 07:00), Max: 36.9 (03 Feb 2022 19:19)  T(F): 98.2 (04 Feb 2022 07:00), Max: 98.4 (03 Feb 2022 19:19)  HR: 62 (04 Feb 2022 07:00) (57 - 78)  BP: 106/67 (04 Feb 2022 07:00) (106/67 - 111/70)  BP(mean): --  RR: 18 (04 Feb 2022 07:00) (18 - 18)  SpO2: 98% (04 Feb 2022 07:00) (94% - 98%)    PHYSICAL EXAM  Constitutional - NAD, Comfortable  HEENT - Staples intact, EOMI  Neck - Supple, No limited ROM  Chest - CTA bilaterally, No wheeze, No rhonchi, No crackles  Cardiovascular - RRR, S1S2, No murmurs  Abdomen - BS+, Soft, NTND  Extremities - No C/C/E, No calf tenderness   Neurologic Exam -                 AAOx2  Improved cognition- AAO x 2  Motor 5/5 bl UE/LEs  no clonus     Psychiatric - Mood stable, Affect WNL    Impression:  67 yo with functional deficits secondary to diagnosis of Brain Mass    Plan:  PT- ROM, Bed Mob, Transfers, Amb w AD and bracing as needed  OT- ADLs, bracing  SLP- Dysphagia eval and treat  Prec- Falls, Cardiac  Monitor Na+  DVT Prophylaxis- Lovenox  Skin- Turn q2 h  Dispo- Acute TBI Rehab- can tolerate 3h/d of therapies and requires daily physician visits  D/W patient and his daughter rehab options and functional prognosis.

## 2022-02-04 NOTE — PROGRESS NOTE ADULT - SUBJECTIVE AND OBJECTIVE BOX
[facility] Division of Hospital Medicine  Ruth Ann MD  Spectra: [phone]      Patient is a 66y old  Male who presents with a chief complaint of brain mass (02 Feb 2022 11:41)      SUBJECTIVE / OVERNIGHT EVENTS: no acute events overnight. has mild headache he states 3-5/10 in severity but overall improved. no fever, chills, chest pain, dyspnea, abd pain, n/v.   ADDITIONAL REVIEW OF SYSTEMS:    MEDICATIONS  (STANDING):  atorvastatin 20 milliGRAM(s) Oral at bedtime  dexAMETHasone  Injectable 2 milliGRAM(s) IV Push every 8 hours  dextrose 40% Gel 15 Gram(s) Oral once  dextrose 5%. 1000 milliLiter(s) (50 mL/Hr) IV Continuous <Continuous>  dextrose 5%. 1000 milliLiter(s) (100 mL/Hr) IV Continuous <Continuous>  dextrose 50% Injectable 50 milliLiter(s) IV Push every 15 minutes  dextrose 50% Injectable 25 milliLiter(s) IV Push every 15 minutes  dextrose 50% Injectable 12.5 Gram(s) IV Push once  dextrose 50% Injectable 25 Gram(s) IV Push once  enoxaparin Injectable 40 milliGRAM(s) SubCutaneous <User Schedule>  glucagon  Injectable 1 milliGRAM(s) IntraMuscular once  insulin glargine Injectable (LANTUS) 15 Unit(s) SubCutaneous at bedtime  insulin lispro (ADMELOG) corrective regimen sliding scale   SubCutaneous three times a day before meals  insulin lispro (ADMELOG) corrective regimen sliding scale   SubCutaneous at bedtime  insulin lispro Injectable (ADMELOG) 10 Unit(s) SubCutaneous three times a day before meals  lacosamide Solution 100 milliGRAM(s) Oral two times a day  lisinopril 5 milliGRAM(s) Oral daily  pantoprazole  Injectable 40 milliGRAM(s) IV Push at bedtime  polyethylene glycol 3350 17 Gram(s) Oral every 12 hours  senna 2 Tablet(s) Oral at bedtime  sodium chloride 2 Gram(s) Oral every 6 hours    MEDICATIONS  (PRN):  acetaminophen     Tablet .. 650 milliGRAM(s) Oral every 6 hours PRN Temp greater or equal to 38C (100.4F), Mild Pain (1 - 3)  bisacodyl 5 milliGRAM(s) Oral daily PRN Constipation      CAPILLARY BLOOD GLUCOSE      POCT Blood Glucose.: 171 mg/dL (04 Feb 2022 08:09)  POCT Blood Glucose.: 118 mg/dL (03 Feb 2022 21:33)  POCT Blood Glucose.: 138 mg/dL (03 Feb 2022 16:28)  POCT Blood Glucose.: 206 mg/dL (03 Feb 2022 11:53)    I&O's Summary    03 Feb 2022 07:01  -  04 Feb 2022 07:00  --------------------------------------------------------  IN: 0 mL / OUT: 350 mL / NET: -350 mL        PHYSICAL EXAM:  Vital Signs Last 24 Hrs  T(C): 36.8 (04 Feb 2022 07:00), Max: 36.9 (03 Feb 2022 19:19)  T(F): 98.2 (04 Feb 2022 07:00), Max: 98.4 (03 Feb 2022 19:19)  HR: 62 (04 Feb 2022 07:00) (57 - 78)  BP: 106/67 (04 Feb 2022 07:00) (106/67 - 111/70)  BP(mean): --  RR: 18 (04 Feb 2022 07:00) (18 - 18)  SpO2: 98% (04 Feb 2022 07:00) (94% - 98%)    CONSTITUTIONAL: NAD, well-developed, well-groomed  EYES: PERRLA; conjunctiva and sclera clear  ENMT: Moist oral mucosa, no pharyngeal injection or exudates; normal dentition  NECK: Supple, no palpable masses; no thyromegaly  RESPIRATORY: Normal respiratory effort; lungs are clear to auscultation bilaterally  CARDIOVASCULAR: Regular rate and rhythm, normal S1 and S2, no murmur/rub/gallop; No lower extremity edema; Peripheral pulses are 2+ bilaterally  ABDOMEN: Soft, Nondistended, Nontender to palpation, normoactive bowel sounds  MUSCULOSKELETAL:  No clubbing or cyanosis of digits; no joint swelling or tenderness to palpation  PSYCH: A+O to person and state [place]), not year though knows it is February  NEUROLOGY: no gross sensory deficits, 5/5 strength throughout  SKIN: +crani incision with staples, c/d/i    LABS:    02-04    133<L>  |  99  |  21  ----------------------------<  150<H>  3.9   |  23  |  0.71    Ca    8.1<L>      04 Feb 2022 06:05          RADIOLOGY & ADDITIONAL TESTS:  Results Reviewed: hyponatremia overall improved/stable, Cr stable  Imaging Personally Reviewed:  Electrocardiogram Personally Reviewed:    COORDINATION OF CARE:  Care Discussed with Consultants/Other Providers [Y]: neurosurgery SERGIO Baron  Prior or Outpatient Records Reviewed [Y/N]:

## 2022-02-05 DIAGNOSIS — D72.829 ELEVATED WHITE BLOOD CELL COUNT, UNSPECIFIED: ICD-10-CM

## 2022-02-05 LAB
ANION GAP SERPL CALC-SCNC: 13 MMOL/L — SIGNIFICANT CHANGE UP (ref 5–17)
BUN SERPL-MCNC: 22 MG/DL — SIGNIFICANT CHANGE UP (ref 7–23)
CALCIUM SERPL-MCNC: 8.5 MG/DL — SIGNIFICANT CHANGE UP (ref 8.4–10.5)
CHLORIDE SERPL-SCNC: 96 MMOL/L — SIGNIFICANT CHANGE UP (ref 96–108)
CO2 SERPL-SCNC: 22 MMOL/L — SIGNIFICANT CHANGE UP (ref 22–31)
CREAT SERPL-MCNC: 0.82 MG/DL — SIGNIFICANT CHANGE UP (ref 0.5–1.3)
GLUCOSE BLDC GLUCOMTR-MCNC: 121 MG/DL — HIGH (ref 70–99)
GLUCOSE BLDC GLUCOMTR-MCNC: 186 MG/DL — HIGH (ref 70–99)
GLUCOSE BLDC GLUCOMTR-MCNC: 232 MG/DL — HIGH (ref 70–99)
GLUCOSE BLDC GLUCOMTR-MCNC: 234 MG/DL — HIGH (ref 70–99)
GLUCOSE BLDC GLUCOMTR-MCNC: 87 MG/DL — SIGNIFICANT CHANGE UP (ref 70–99)
GLUCOSE SERPL-MCNC: 184 MG/DL — HIGH (ref 70–99)
HCT VFR BLD CALC: 32 % — LOW (ref 39–50)
HGB BLD-MCNC: 10.9 G/DL — LOW (ref 13–17)
MCHC RBC-ENTMCNC: 31.4 PG — SIGNIFICANT CHANGE UP (ref 27–34)
MCHC RBC-ENTMCNC: 34.1 GM/DL — SIGNIFICANT CHANGE UP (ref 32–36)
MCV RBC AUTO: 92.2 FL — SIGNIFICANT CHANGE UP (ref 80–100)
NRBC # BLD: 0 /100 WBCS — SIGNIFICANT CHANGE UP (ref 0–0)
PLATELET # BLD AUTO: 157 K/UL — SIGNIFICANT CHANGE UP (ref 150–400)
POTASSIUM SERPL-MCNC: 4.1 MMOL/L — SIGNIFICANT CHANGE UP (ref 3.5–5.3)
POTASSIUM SERPL-SCNC: 4.1 MMOL/L — SIGNIFICANT CHANGE UP (ref 3.5–5.3)
RBC # BLD: 3.47 M/UL — LOW (ref 4.2–5.8)
RBC # FLD: 12.2 % — SIGNIFICANT CHANGE UP (ref 10.3–14.5)
SODIUM SERPL-SCNC: 131 MMOL/L — LOW (ref 135–145)
WBC # BLD: 12.68 K/UL — HIGH (ref 3.8–10.5)
WBC # FLD AUTO: 12.68 K/UL — HIGH (ref 3.8–10.5)

## 2022-02-05 PROCEDURE — 99233 SBSQ HOSP IP/OBS HIGH 50: CPT

## 2022-02-05 RX ORDER — INSULIN GLARGINE 100 [IU]/ML
18 INJECTION, SOLUTION SUBCUTANEOUS AT BEDTIME
Refills: 0 | Status: DISCONTINUED | OUTPATIENT
Start: 2022-02-05 | End: 2022-02-06

## 2022-02-05 RX ORDER — INSULIN LISPRO 100/ML
13 VIAL (ML) SUBCUTANEOUS
Refills: 0 | Status: DISCONTINUED | OUTPATIENT
Start: 2022-02-05 | End: 2022-02-06

## 2022-02-05 RX ORDER — MULTIVIT WITH MIN/MFOLATE/K2 340-15/3 G
1 POWDER (GRAM) ORAL ONCE
Refills: 0 | Status: DISCONTINUED | OUTPATIENT
Start: 2022-02-05 | End: 2022-02-05

## 2022-02-05 RX ORDER — INSULIN GLARGINE 100 [IU]/ML
18 INJECTION, SOLUTION SUBCUTANEOUS AT BEDTIME
Refills: 0 | Status: DISCONTINUED | OUTPATIENT
Start: 2022-02-05 | End: 2022-02-05

## 2022-02-05 RX ADMIN — Medication 4: at 16:29

## 2022-02-05 RX ADMIN — Medication 2: at 08:05

## 2022-02-05 RX ADMIN — Medication 12 UNIT(S): at 08:06

## 2022-02-05 RX ADMIN — LACOSAMIDE 100 MILLIGRAM(S): 50 TABLET ORAL at 05:11

## 2022-02-05 RX ADMIN — SODIUM CHLORIDE 2 GRAM(S): 9 INJECTION INTRAMUSCULAR; INTRAVENOUS; SUBCUTANEOUS at 05:11

## 2022-02-05 RX ADMIN — ATORVASTATIN CALCIUM 20 MILLIGRAM(S): 80 TABLET, FILM COATED ORAL at 21:34

## 2022-02-05 RX ADMIN — Medication 12 UNIT(S): at 12:05

## 2022-02-05 RX ADMIN — PANTOPRAZOLE SODIUM 40 MILLIGRAM(S): 20 TABLET, DELAYED RELEASE ORAL at 21:35

## 2022-02-05 RX ADMIN — INSULIN GLARGINE 18 UNIT(S): 100 INJECTION, SOLUTION SUBCUTANEOUS at 22:50

## 2022-02-05 RX ADMIN — SODIUM CHLORIDE 2 GRAM(S): 9 INJECTION INTRAMUSCULAR; INTRAVENOUS; SUBCUTANEOUS at 21:34

## 2022-02-05 RX ADMIN — Medication 2 MILLIGRAM(S): at 13:20

## 2022-02-05 RX ADMIN — Medication 13 UNIT(S): at 16:29

## 2022-02-05 RX ADMIN — LISINOPRIL 5 MILLIGRAM(S): 2.5 TABLET ORAL at 05:11

## 2022-02-05 RX ADMIN — SODIUM CHLORIDE 2 GRAM(S): 9 INJECTION INTRAMUSCULAR; INTRAVENOUS; SUBCUTANEOUS at 17:03

## 2022-02-05 RX ADMIN — Medication 2 MILLIGRAM(S): at 21:35

## 2022-02-05 RX ADMIN — Medication 2 MILLIGRAM(S): at 05:11

## 2022-02-05 RX ADMIN — SODIUM CHLORIDE 2 GRAM(S): 9 INJECTION INTRAMUSCULAR; INTRAVENOUS; SUBCUTANEOUS at 11:58

## 2022-02-05 RX ADMIN — ENOXAPARIN SODIUM 40 MILLIGRAM(S): 100 INJECTION SUBCUTANEOUS at 17:03

## 2022-02-05 RX ADMIN — Medication 4: at 12:04

## 2022-02-05 RX ADMIN — POLYETHYLENE GLYCOL 3350 17 GRAM(S): 17 POWDER, FOR SOLUTION ORAL at 17:11

## 2022-02-05 RX ADMIN — LACOSAMIDE 100 MILLIGRAM(S): 50 TABLET ORAL at 17:03

## 2022-02-05 NOTE — PROGRESS NOTE ADULT - PROBLEM SELECTOR PLAN 8
DVT: lovenox. LE duplex on 1/26 negative for DVT.  last BM 2/5. continue bowel regimen.  Dispo: acute rehab

## 2022-02-05 NOTE — PROGRESS NOTE ADULT - PROBLEM SELECTOR PLAN 2
overall improved. s/p 2% nacl IVF  - c/w salt tabs 2g q6h  - monitor Na on BMP new mild leukocytosis today.  - afebrile, non-toxic appearing  - would monitor WBC on CBC daily, ordered for CBC with diff tomorrow  - if febrile, would send infectious work-up but currently low suspicion for infection  - monitor off abx

## 2022-02-05 NOTE — PROGRESS NOTE ADULT - PROBLEM SELECTOR PLAN 5
- c/w atorvastatin 20 mg qHS On metformin 1000mg bid at home; last a1c 10.7 on this admission  - patient passed S&S and started on PO diet  - increase lantus 18units qHS  - increase premeal 13 units with medium-intensity sliding scale  - Will titrate insulin depending on FS trend  - FS/MISS qac/qhs  - Monitor for hyperglycemia while on steroids

## 2022-02-05 NOTE — PROGRESS NOTE ADULT - PROBLEM SELECTOR PLAN 3
On lisinopril 2.5 mg daily at home  - c/w lisinopril 5mg daily (decreased started today 2/4) with hold parameters  - if SBP persistently below<110, may need to decrease back to home dose of 2.5mg daily but has been stable on current dose  - monitor vitals overall improved. s/p 2% nacl IVF  - c/w salt tabs 2g q6h  - monitor Na on BMP

## 2022-02-05 NOTE — PROGRESS NOTE ADULT - ASSESSMENT
HPI: 66M hx HTN/DM [place] early January, was confused and not answering appropriately, MRI Jan 7 showed L frontal enhancing mass with significant edema. Admitted on 1/24; 1/27 s/p LT crani for left frontal brain tumor resection with Gleolan POD# 9    PLAN:    Neuro:   - Neuro checks every 4 hours  - Continue vimpat 100 BID for seizure prophylaxis - on 1/27 had episode of lower extremity shaking but EEG was negative x 48 hrs  - Decadron for cerebral edema; slow taper   - Mobilization as tolerated    Respiratory:   - Tolerating room air  - Incentive spirometer    CV:  - Continue Lisinopril 5mg daily for HTN  - Continue Lipitor for history of HLD    GI:   - Tolerating CCD diet  - Continue miralax and senna   - Continue Protonix for GI prophylaxis while on steroids    Renal:   - Voiding  - Replete lytes PRN  - Continue sodium chloride tabs 2g q6 for hyponatremia     Endocrine:   - Continue Lantus 18 units qHS, Admelog 13 qHS TID AC, moderate ISS for steroid induced hyperglycemia. Check fingersticks TID AC.     Heme/Onc:               - DVT ppx: sql and scds   - Neuro-onc follow up as outpatient for further treatment    ID:   - Afebrile     DISPO:   - PT/OT/PMR - Acute TBI  - Will discuss with Dr. Candy Mireles # [id number]        Social/Family:   Discharge planning: rehab :p   covid sent     -will discuss with Dr. Candy ambrocio [phone]

## 2022-02-05 NOTE — PROGRESS NOTE ADULT - PROBLEM SELECTOR PLAN 4
On metformin 1000mg bid at home; last a1c 10.7 on this admission  - patient passed S&S and started on PO diet  - increase lantus 18units qHS  - increase premeal 13 units with medium-intensity sliding scale  - Will titrate insulin depending on FS trend  - FS/MISS qac/qhs  - Monitor for hyperglycemia while on steroids On lisinopril 2.5 mg daily at home  - c/w lisinopril 5mg daily (decreased started today 2/4) with hold parameters  - if SBP persistently below<110, may need to decrease back to home dose of 2.5mg daily but has been stable on current dose  - monitor vitals

## 2022-02-05 NOTE — PROGRESS NOTE ADULT - PROBLEM SELECTOR PLAN 7
DVT: lovenox. LE duplex on 1/26 negative for DVT.  last BM 2/5. continue bowel regimen.  Dispo: acute rehab - mild and remains relatively stable  - monitor CBC periodically

## 2022-02-05 NOTE — PROGRESS NOTE ADULT - SUBJECTIVE AND OBJECTIVE BOX
[facility] Division of Hospital Medicine  Ruth Ann MD  Spectra: [phone]      Patient is a 66y old  Male who presents with a chief complaint of Patient was admitted with left frontal mass. (04 Feb 2022 13:32)      SUBJECTIVE / OVERNIGHT EVENTS: no acute events overnight. feels well without complaints. denies any fever nor headache. +bowel movement this morning.   ADDITIONAL REVIEW OF SYSTEMS:    MEDICATIONS  (STANDING):  atorvastatin 20 milliGRAM(s) Oral at bedtime  dexAMETHasone  Injectable 2 milliGRAM(s) IV Push every 8 hours  dextrose 40% Gel 15 Gram(s) Oral once  dextrose 5%. 1000 milliLiter(s) (50 mL/Hr) IV Continuous <Continuous>  dextrose 5%. 1000 milliLiter(s) (100 mL/Hr) IV Continuous <Continuous>  dextrose 50% Injectable 50 milliLiter(s) IV Push every 15 minutes  dextrose 50% Injectable 25 milliLiter(s) IV Push every 15 minutes  dextrose 50% Injectable 12.5 Gram(s) IV Push once  dextrose 50% Injectable 25 Gram(s) IV Push once  enoxaparin Injectable 40 milliGRAM(s) SubCutaneous <User Schedule>  glucagon  Injectable 1 milliGRAM(s) IntraMuscular once  insulin glargine Injectable (LANTUS) 18 Unit(s) SubCutaneous at bedtime  insulin lispro (ADMELOG) corrective regimen sliding scale   SubCutaneous three times a day before meals  insulin lispro (ADMELOG) corrective regimen sliding scale   SubCutaneous at bedtime  insulin lispro Injectable (ADMELOG) 13 Unit(s) SubCutaneous three times a day before meals  lacosamide Solution 100 milliGRAM(s) Oral two times a day  lisinopril 5 milliGRAM(s) Oral daily  pantoprazole  Injectable 40 milliGRAM(s) IV Push at bedtime  polyethylene glycol 3350 17 Gram(s) Oral every 12 hours  senna 2 Tablet(s) Oral at bedtime  sodium chloride 2 Gram(s) Oral every 6 hours    MEDICATIONS  (PRN):  acetaminophen     Tablet .. 650 milliGRAM(s) Oral every 6 hours PRN Temp greater or equal to 38C (100.4F), Mild Pain (1 - 3)  bisacodyl 5 milliGRAM(s) Oral daily PRN Constipation      CAPILLARY BLOOD GLUCOSE      POCT Blood Glucose.: 234 mg/dL (05 Feb 2022 11:56)  POCT Blood Glucose.: 186 mg/dL (05 Feb 2022 08:03)  POCT Blood Glucose.: 179 mg/dL (04 Feb 2022 21:07)  POCT Blood Glucose.: 111 mg/dL (04 Feb 2022 16:24)  POCT Blood Glucose.: 242 mg/dL (04 Feb 2022 12:05)    I&O's Summary    04 Feb 2022 07:01  -  05 Feb 2022 07:00  --------------------------------------------------------  IN: 0 mL / OUT: 700 mL / NET: -700 mL        PHYSICAL EXAM:  Vital Signs Last 24 Hrs  T(C): 36.5 (05 Feb 2022 08:18), Max: 36.8 (04 Feb 2022 15:13)  T(F): 97.7 (05 Feb 2022 08:18), Max: 98.3 (05 Feb 2022 04:30)  HR: 63 (05 Feb 2022 08:18) (55 - 71)  BP: 108/68 (05 Feb 2022 08:18) (106/63 - 124/72)  BP(mean): --  RR: 18 (05 Feb 2022 08:18) (18 - 18)  SpO2: 97% (05 Feb 2022 08:18) (93% - 98%)    CONSTITUTIONAL: NAD, well-developed, well-groomed  EYES: PERRLA; conjunctiva and sclera clear  ENMT: Moist oral mucosa, no pharyngeal injection or exudates; normal dentition  NECK: Supple, no palpable masses; no thyromegaly  RESPIRATORY: Normal respiratory effort; lungs are clear to auscultation bilaterally  CARDIOVASCULAR: Regular rate and rhythm, normal S1 and S2, no murmur/rub/gallop; No lower extremity edema; Peripheral pulses are 2+ bilaterally  ABDOMEN: Soft, Nondistended, Nontender to palpation, normoactive bowel sounds  MUSCULOSKELETAL:  No clubbing or cyanosis of digits; no joint swelling or tenderness to palpation  PSYCH: A+O to person, place ([place]), and year if given time to answer  NEUROLOGY: no gross sensory deficits, 5/5 strength throughout  SKIN: +crani incision with staples, c/d/i    LABS:                        10.9   12.68 )-----------( 157      ( 05 Feb 2022 06:57 )             32.0     02-05    131<L>  |  96  |  22  ----------------------------<  184<H>  4.1   |  22  |  0.82    Ca    8.5      05 Feb 2022 07:00          RADIOLOGY & ADDITIONAL TESTS:  Results Reviewed: stable hyponatremia  Imaging Personally Reviewed:  Electrocardiogram Personally Reviewed:    COORDINATION OF CARE:  Care Discussed with Consultants/Other Providers [Y]: Neurosurgery SERGIO Quezada  Prior or Outpatient Records Reviewed [Y/N]:

## 2022-02-05 NOTE — PROGRESS NOTE ADULT - PROBLEM SELECTOR PLAN 1
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

## 2022-02-05 NOTE — PROGRESS NOTE ADULT - SUBJECTIVE AND OBJECTIVE BOX
SUBJECTIVE: Patient was seen and evaluated at bedside. Patient is resting in bed and is in no new acute distress. Denies chest pain, shortness of breath, nausea/vomiting. Complains of headache improved with current pain medication regimen.     Vital Signs Last 24 Hrs  T(C): 37 (02-05-22 @ 12:07), Max: 37 (02-05-22 @ 12:07)  T(F): 98.6 (02-05-22 @ 12:07), Max: 98.6 (02-05-22 @ 12:07)  HR: 65 (02-05-22 @ 12:07) (55 - 71)  BP: 104/69 (02-05-22 @ 12:07) (104/69 - 124/72)  RR: 18 (02-05-22 @ 12:07) (18 - 18)  SpO2: 98% (02-05-22 @ 12:07) (93% - 98%)    PHYSICAL EXAM:    General: No Acute Distress, resting comfortably in bed    Neurological: Awake, alert oriented to person, place and time, Following Commands, PERRL, EOMI, Face Symmetrical, Speech Fluent, Moving all extremities, Muscle Strength normal in all four extremities, mild right upper pronator drift, Sensation to Light Touch Intact    Incision: +bifrontal staples C/D/I    Pulmonary: Clear to Auscultation, No Rales, No Rhonchi, No Wheezes     Cardiovascular: S1, S2, Regular Rate and Rhythm     Gastrointestinal: Soft, Nontender, Nondistended       LABS:                          10.9   12.68 )-----------( 157      ( 05 Feb 2022 06:57 )             32.0   02-05    131<L>  |  96  |  22  ----------------------------<  184<H>  4.1   |  22  |  0.82    Ca    8.5      05 Feb 2022 07:00    IMAGING:       ACC: [id number] EXAM:  CT BRAIN                          PROCEDURE DATE:  01/30/2022    INTERPRETATION:  Clinical indications: Status post surgery.  Multiple axial sections were performed from base skull to vertex without   contrast enhancement.    Postoperative changes compatible the high left frontal craniotomy is   again seen. Evaluation of the postop bed does demonstrate abnormal   low-attenuation though there are scattered areas of high attenuation   again seen which could be compatible with areas of hemorrhage at the   postop material. Extra-axial fluid/air is identified in the postoperative   region which measures approximately 1.6 cm in widest diameter. Overall   the amount of air has decreased when compared with the prior exam. This   finding previously measured approximately 1.3 cm in widest diameter. Area   of vasogenic edema around the postop region is again seen and unchanged.   Mass effect on the left lateral ventricle is again seen with   left-to-right shift at the C6 (9.6 mm).    Evaluation of the osseous structures with the appropriate window aside   from postop changes appear normal    Extra-axial soft tissue swelling and stables with associated air are seen   involving the high bifrontal region.    Left maxillary polyp versus retention cyst is seen    Both mastoid and middle ear regions appear clear.    IMPRESSION: Postop changes are again seen as described above.    --- End of Report ---        MEDICATIONS  (STANDING):  atorvastatin 20 milliGRAM(s) Oral at bedtime  dexAMETHasone  Injectable 2 milliGRAM(s) IV Push every 8 hours  dextrose 40% Gel 15 Gram(s) Oral once  dextrose 5%. 1000 milliLiter(s) (50 mL/Hr) IV Continuous <Continuous>  dextrose 5%. 1000 milliLiter(s) (100 mL/Hr) IV Continuous <Continuous>  dextrose 50% Injectable 50 milliLiter(s) IV Push every 15 minutes  dextrose 50% Injectable 25 milliLiter(s) IV Push every 15 minutes  dextrose 50% Injectable 12.5 Gram(s) IV Push once  dextrose 50% Injectable 25 Gram(s) IV Push once  enoxaparin Injectable 40 milliGRAM(s) SubCutaneous <User Schedule>  glucagon  Injectable 1 milliGRAM(s) IntraMuscular once  insulin glargine Injectable (LANTUS) 18 Unit(s) SubCutaneous at bedtime  insulin lispro (ADMELOG) corrective regimen sliding scale   SubCutaneous three times a day before meals  insulin lispro (ADMELOG) corrective regimen sliding scale   SubCutaneous at bedtime  insulin lispro Injectable (ADMELOG) 13 Unit(s) SubCutaneous three times a day before meals  lacosamide Solution 100 milliGRAM(s) Oral two times a day  lisinopril 5 milliGRAM(s) Oral daily  pantoprazole  Injectable 40 milliGRAM(s) IV Push at bedtime  polyethylene glycol 3350 17 Gram(s) Oral every 12 hours  senna 2 Tablet(s) Oral at bedtime  sodium chloride 2 Gram(s) Oral every 6 hours    MEDICATIONS  (PRN):  acetaminophen     Tablet .. 650 milliGRAM(s) Oral every 6 hours PRN Temp greater or equal to 38C (100.4F), Mild Pain (1 - 3)  bisacodyl 5 milliGRAM(s) Oral daily PRN Constipation

## 2022-02-06 LAB
ANION GAP SERPL CALC-SCNC: 12 MMOL/L — SIGNIFICANT CHANGE UP (ref 5–17)
BUN SERPL-MCNC: 20 MG/DL — SIGNIFICANT CHANGE UP (ref 7–23)
CALCIUM SERPL-MCNC: 8.3 MG/DL — LOW (ref 8.4–10.5)
CHLORIDE SERPL-SCNC: 98 MMOL/L — SIGNIFICANT CHANGE UP (ref 96–108)
CO2 SERPL-SCNC: 22 MMOL/L — SIGNIFICANT CHANGE UP (ref 22–31)
CREAT SERPL-MCNC: 0.7 MG/DL — SIGNIFICANT CHANGE UP (ref 0.5–1.3)
GLUCOSE BLDC GLUCOMTR-MCNC: 164 MG/DL — HIGH (ref 70–99)
GLUCOSE BLDC GLUCOMTR-MCNC: 200 MG/DL — HIGH (ref 70–99)
GLUCOSE BLDC GLUCOMTR-MCNC: 241 MG/DL — HIGH (ref 70–99)
GLUCOSE BLDC GLUCOMTR-MCNC: 66 MG/DL — LOW (ref 70–99)
GLUCOSE BLDC GLUCOMTR-MCNC: 73 MG/DL — SIGNIFICANT CHANGE UP (ref 70–99)
GLUCOSE BLDC GLUCOMTR-MCNC: 96 MG/DL — SIGNIFICANT CHANGE UP (ref 70–99)
GLUCOSE SERPL-MCNC: 179 MG/DL — HIGH (ref 70–99)
HCT VFR BLD CALC: 32 % — LOW (ref 39–50)
HGB BLD-MCNC: 10.9 G/DL — LOW (ref 13–17)
MAGNESIUM SERPL-MCNC: 1.7 MG/DL — SIGNIFICANT CHANGE UP (ref 1.6–2.6)
MCHC RBC-ENTMCNC: 31.2 PG — SIGNIFICANT CHANGE UP (ref 27–34)
MCHC RBC-ENTMCNC: 34.1 GM/DL — SIGNIFICANT CHANGE UP (ref 32–36)
MCV RBC AUTO: 91.7 FL — SIGNIFICANT CHANGE UP (ref 80–100)
NRBC # BLD: 0 /100 WBCS — SIGNIFICANT CHANGE UP (ref 0–0)
PHOSPHATE SERPL-MCNC: 3.4 MG/DL — SIGNIFICANT CHANGE UP (ref 2.5–4.5)
PLATELET # BLD AUTO: 158 K/UL — SIGNIFICANT CHANGE UP (ref 150–400)
POTASSIUM SERPL-MCNC: 3.7 MMOL/L — SIGNIFICANT CHANGE UP (ref 3.5–5.3)
POTASSIUM SERPL-SCNC: 3.7 MMOL/L — SIGNIFICANT CHANGE UP (ref 3.5–5.3)
RBC # BLD: 3.49 M/UL — LOW (ref 4.2–5.8)
RBC # FLD: 12.5 % — SIGNIFICANT CHANGE UP (ref 10.3–14.5)
SARS-COV-2 RNA SPEC QL NAA+PROBE: SIGNIFICANT CHANGE UP
SODIUM SERPL-SCNC: 132 MMOL/L — LOW (ref 135–145)
WBC # BLD: 10.59 K/UL — HIGH (ref 3.8–10.5)
WBC # FLD AUTO: 10.59 K/UL — HIGH (ref 3.8–10.5)

## 2022-02-06 PROCEDURE — 99232 SBSQ HOSP IP/OBS MODERATE 35: CPT

## 2022-02-06 RX ORDER — INSULIN LISPRO 100/ML
10 VIAL (ML) SUBCUTANEOUS
Refills: 0 | Status: DISCONTINUED | OUTPATIENT
Start: 2022-02-06 | End: 2022-02-08

## 2022-02-06 RX ORDER — POTASSIUM CHLORIDE 20 MEQ
40 PACKET (EA) ORAL ONCE
Refills: 0 | Status: COMPLETED | OUTPATIENT
Start: 2022-02-06 | End: 2022-02-06

## 2022-02-06 RX ORDER — INSULIN GLARGINE 100 [IU]/ML
15 INJECTION, SOLUTION SUBCUTANEOUS AT BEDTIME
Refills: 0 | Status: DISCONTINUED | OUTPATIENT
Start: 2022-02-06 | End: 2022-02-08

## 2022-02-06 RX ORDER — MAGNESIUM SULFATE 500 MG/ML
2 VIAL (ML) INJECTION ONCE
Refills: 0 | Status: COMPLETED | OUTPATIENT
Start: 2022-02-06 | End: 2022-02-06

## 2022-02-06 RX ADMIN — SODIUM CHLORIDE 2 GRAM(S): 9 INJECTION INTRAMUSCULAR; INTRAVENOUS; SUBCUTANEOUS at 17:29

## 2022-02-06 RX ADMIN — LACOSAMIDE 100 MILLIGRAM(S): 50 TABLET ORAL at 05:09

## 2022-02-06 RX ADMIN — Medication 40 MILLIEQUIVALENT(S): at 08:47

## 2022-02-06 RX ADMIN — ENOXAPARIN SODIUM 40 MILLIGRAM(S): 100 INJECTION SUBCUTANEOUS at 17:29

## 2022-02-06 RX ADMIN — POLYETHYLENE GLYCOL 3350 17 GRAM(S): 17 POWDER, FOR SOLUTION ORAL at 17:29

## 2022-02-06 RX ADMIN — PANTOPRAZOLE SODIUM 40 MILLIGRAM(S): 20 TABLET, DELAYED RELEASE ORAL at 21:36

## 2022-02-06 RX ADMIN — LACOSAMIDE 100 MILLIGRAM(S): 50 TABLET ORAL at 17:29

## 2022-02-06 RX ADMIN — Medication 2 MILLIGRAM(S): at 13:37

## 2022-02-06 RX ADMIN — Medication 2 MILLIGRAM(S): at 05:09

## 2022-02-06 RX ADMIN — INSULIN GLARGINE 15 UNIT(S): 100 INJECTION, SOLUTION SUBCUTANEOUS at 21:35

## 2022-02-06 RX ADMIN — Medication 2: at 08:04

## 2022-02-06 RX ADMIN — LISINOPRIL 5 MILLIGRAM(S): 2.5 TABLET ORAL at 05:08

## 2022-02-06 RX ADMIN — ATORVASTATIN CALCIUM 20 MILLIGRAM(S): 80 TABLET, FILM COATED ORAL at 21:36

## 2022-02-06 RX ADMIN — Medication 13 UNIT(S): at 08:05

## 2022-02-06 RX ADMIN — Medication 4: at 16:25

## 2022-02-06 RX ADMIN — SODIUM CHLORIDE 2 GRAM(S): 9 INJECTION INTRAMUSCULAR; INTRAVENOUS; SUBCUTANEOUS at 11:42

## 2022-02-06 RX ADMIN — Medication 25 GRAM(S): at 08:46

## 2022-02-06 RX ADMIN — Medication 10 UNIT(S): at 16:25

## 2022-02-06 RX ADMIN — SODIUM CHLORIDE 2 GRAM(S): 9 INJECTION INTRAMUSCULAR; INTRAVENOUS; SUBCUTANEOUS at 05:08

## 2022-02-06 RX ADMIN — Medication 2 MILLIGRAM(S): at 21:36

## 2022-02-06 RX ADMIN — SENNA PLUS 2 TABLET(S): 8.6 TABLET ORAL at 21:36

## 2022-02-06 NOTE — PROGRESS NOTE ADULT - ASSESSMENT
Patient:   BELLA COLLAZO            MRN: [id number]            FIN: [id number]               Age:   58 years     Sex:  FEMALE     :  62   Associated Diagnoses:   None   Author:   KENNETH CALDWELL      OPERATIVE REPORT        PREOPERATIVE DIAGNOSIS:  Left foot hammertoe 2nd and 4th digits      POSTOPERATIVE DIAGNOSIS:  Left foot hammertoe 2nd and 4th digits      PROCEDURE  Left foot hammertoe 2nd and 4th digit surgical correction      SURGEON:  Kenneth Monet DPM      ASSISTANT:  Kamran Moreno DPM Resident      FINDINGS:  none      SPECIMENS REMOVED:  none    ESTIMATED BLOOD LOSS:  less than 5cc      BLOOD ADMINISTRATION:  none      COMPLICATIONS:  none      GRAFTS/IMPLANTS:  one 3.5mm and one 4.5mm K-wire, staples      ANESTHESIA:  MAC with local block      HEMOSTASIS:  Ankle tourniquet 58 mins at 200mmHg      DESCRIPTION OF PROCEDURE:    The patient was brought into the Operating Room and placed on the Operating Room table in supine position. Ankle tourniquet was subsequently applied. Using a total of 20mL of 1:1 mixture of 0.25% Bupivacaine and 2% Lidocaine a tibial nerve and midfoot ring block was performed on the left foot. Following this the left foot was scrubbed, prepped and draped in the usual aseptic technique.     At this time attention was directed to the 2nd and 4th digits of the left foot which were noted to have flexion contractures at both PIP joints with dorsal exostosis growth palpated at both contractures.  Anaesthesia was checked and patient felt no pain. Using a number 15 blade two dorsal longitudinal  incisions were made proximal to left 2nd MT head first and then proximal to 4th MT head second. The 2nd extensor digitorum longus tendon was transected in a z cut fashion then the 4th extensor digitorum longus tendon was transected in a z cut fashion. 2nd and 4th digits were noted to have less extensor pull and were on equal purchasing with other digits with Kelikian push  up. Using a 15 blade a longitudinal incision was made from from base of proximal phalanx of 2nd to head of the middle phalanx. Patient began moving excessively and another 10cc of 1:1 mixture of 0.25% bupivacaine and 2% lidocaine were injected in a ring block circumferentially around left midfoot. Once further anesthesia was met an elliptical longitudinal insicion was then made on the 4th digit form base of the proximal phalanx to the head of the middle phalanx. The 2nd PIP joint was found and the medial and lateral collateral ligaments were resected off of the head of the proximal phalanx. Then the 4th PIP joint was found and the medial and lateral collateral ligaments were resected.Using a sagittal saw about 3mm   of the 2nd and 4th  proximal phalange heads were then excised. Next copious amounts of saline solution were used to irrigate the surgical sites. Then  A 3.5mm K-wire was then placed longitudinal through the 2nd digit in a retrograde fashion and a 4.5mm K-wire was then placed through the 4th digit also in a retrograde fashion as well. At this time the digits were evluated and determined to be in anatomical alignment with no contractures and with good purchasing. Both K-wires were then cut using a , bent up dorsally, and fashioned with protective Jurgan pin protectors. The 2nd and 4th digital skin incisions were approximated together using staples and the proximal stab incisions were approximated together using 4-0 nylon. The foot was cleaned with wet sponge and then dressings consisting of xeroform, 4x4 gauze and kerlix were applied to surgical sites. Tourniquet was then deflated. The left foot was warm with CFT < 3 sec to digits 2 and 4.     The patient tolerated the above procedure and anesthesia well and was taken from the Operating Room to the PACU with vital signs stable and vascular status intact to the left foot. Post-operative orders were placed. Patient was discharged once deemed fit  so by [facility] protocol.             Electronically Signed On 08/22/2018 18:08  __________________________________________________   KENNETH CALDWELL     HPI: 66M hx HTN/DM [place] early January, was confused and not answering appropriately, MRI Jan 7 showed L frontal enhancing mass with significant edema. Admitted on 1/24; 1/27 s/p LT crani for left frontal brain tumor resection with Gleolan.     PLAN:    Neuro:   - Neuro checks every 4 hours  - Continue vimpat 100 BID for seizure prophylaxis - on 1/27 had episode of lower extremity shaking but EEG was negative x 48 hrs  - Decadron for cerebral edema; slow taper   - Mobilization as tolerated    Respiratory:   - Tolerating room air  - Incentive spirometer    CV:  - Continue Lisinopril 5mg daily for HTN  - Continue Lipitor for history of HLD    GI:   - Tolerating CCD diet  - Continue miralax and senna   - Continue Protonix for GI prophylaxis while on steroids    Renal:   - Voiding  - Replete lytes PRN  - Continue sodium chloride tabs 2g q6 for hyponatremia     Endocrine:   - Continue Lantus 15 units qHS, Admelog 10 qHS TID AC, moderate ISS for steroid induced hyperglycemia. Check fingersticks TID AC.     Heme/Onc:               - DVT ppx: sql and scds   - Neuro-onc follow up as outpatient for further treatment    ID:   - Afebrile     DISPO:   - PT/OT/PMR - Acute TBI upon d/c    - Will discuss with Dr. Candy Mireles # [phone]    Social/Family:   Discharge planning: rehab :p   covid sent     -will discuss with Dr. Candy ambrocio [phone]

## 2022-02-06 NOTE — PROGRESS NOTE ADULT - PROBLEM SELECTOR PLAN 4
On lisinopril 2.5 mg daily at home  - c/w lisinopril 5mg daily (decreased started today 2/4) with hold parameters  - if SBP persistently below<110, may need to decrease back to home dose of 2.5mg daily but has been stable on current dose  - monitor vitals

## 2022-02-06 NOTE — PROGRESS NOTE ADULT - NSPROGADDITIONALINFOA_GEN_ALL_CORE
.  Ruth Ann MD  Division of Hospital Medicine     Spectra: [phone]    Plan discussed with patient and neurosurgery SERGIO Quezada.
.  Ruth Ann MD  Division of Hospital Medicine  [facility]   Spectra: [phone]    Plan discussed with patient and neurosurgery SERGIO Baron.
.  Ruth Ann MD  Division of Hospital Medicine  [facility]   Spectra: [phone]    Would send COVID-19 PCR today (ordered) for DC planning for rehab.    Plan discussed with patient and neurosurgery SERGIO Gonzalez.

## 2022-02-06 NOTE — PROGRESS NOTE ADULT - PROBLEM SELECTOR PLAN 5
On metformin 1000mg bid at home; last a1c 10.7 on this admission  - patient passed S&S and started on PO diet  - decreased lantus to 15 units qHS  - decreased premeal to 10 units with medium-intensity sliding scale  - Will titrate insulin depending on FS trend  - FS/MISS qac/qhs  - Monitor for hyperglycemia while on steroids

## 2022-02-06 NOTE — PROGRESS NOTE ADULT - SUBJECTIVE AND OBJECTIVE BOX
SUBJECTIVE: Patient was seen and evaluated at bedside. Patient is resting in bed and is in no new acute distress. Denies chest pain, shortness of breath, nausea/vomiting. Complains of headache improved with current pain medication regimen.     Vital Signs Last 24 Hrs  T(C): 37.1 (06 Feb 2022 15:41), Max: 37.1 (06 Feb 2022 15:41)  T(F): 98.8 (06 Feb 2022 15:41), Max: 98.8 (06 Feb 2022 15:41)  HR: 62 (06 Feb 2022 15:41) (51 - 70)  BP: 100/62 (06 Feb 2022 15:41) (100/62 - 115/69)  BP(mean): --  RR: 18 (06 Feb 2022 15:41) (18 - 18)  SpO2: 96% (06 Feb 2022 15:41) (94% - 98%)    PHYSICAL EXAM:    General: No Acute Distress, resting comfortably in bed    Neurological: Awake, alert oriented to person, place and time, Following Commands, PERRL, EOMI, Face Symmetrical, Speech Fluent, Moving all extremities, Muscle Strength normal in all four extremities, mild right upper pronator drift, Sensation to Light Touch Intact    Incision: +bifrontal staples C/D/I    Pulmonary: Clear to Auscultation, No Rales, No Rhonchi, No Wheezes     Cardiovascular: S1, S2, Regular Rate and Rhythm     Gastrointestinal: Soft, Nontender, Nondistended       LABS:                               10.9   10.59 )-----------( 158      ( 06 Feb 2022 06:59 )             32.0   02-06    132<L>  |  98  |  20  ----------------------------<  179<H>  3.7   |  22  |  0.70    Ca    8.3<L>      06 Feb 2022 06:58  Phos  3.4     02-06  Mg     1.7     02-06      IMAGING:       ACC: [id number] EXAM:  CT BRAIN                          PROCEDURE DATE:  01/30/2022    INTERPRETATION:  Clinical indications: Status post surgery.  Multiple axial sections were performed from base skull to vertex without   contrast enhancement.    Postoperative changes compatible the high left frontal craniotomy is   again seen. Evaluation of the postop bed does demonstrate abnormal   low-attenuation though there are scattered areas of high attenuation   again seen which could be compatible with areas of hemorrhage at the   postop material. Extra-axial fluid/air is identified in the postoperative   region which measures approximately 1.6 cm in widest diameter. Overall   the amount of air has decreased when compared with the prior exam. This   finding previously measured approximately 1.3 cm in widest diameter. Area   of vasogenic edema around the postop region is again seen and unchanged.   Mass effect on the left lateral ventricle is again seen with   left-to-right shift at the C6 (9.6 mm).    Evaluation of the osseous structures with the appropriate window aside   from postop changes appear normal    Extra-axial soft tissue swelling and stables with associated air are seen   involving the high bifrontal region.    Left maxillary polyp versus retention cyst is seen    Both mastoid and middle ear regions appear clear.    IMPRESSION: Postop changes are again seen as described above.    --- End of Report ---    MEDICATIONS  (STANDING):  atorvastatin 20 milliGRAM(s) Oral at bedtime  dexAMETHasone  Injectable 2 milliGRAM(s) IV Push every 8 hours  dextrose 40% Gel 15 Gram(s) Oral once  dextrose 5%. 1000 milliLiter(s) (50 mL/Hr) IV Continuous <Continuous>  dextrose 5%. 1000 milliLiter(s) (100 mL/Hr) IV Continuous <Continuous>  dextrose 50% Injectable 50 milliLiter(s) IV Push every 15 minutes  dextrose 50% Injectable 25 milliLiter(s) IV Push every 15 minutes  dextrose 50% Injectable 12.5 Gram(s) IV Push once  dextrose 50% Injectable 25 Gram(s) IV Push once  enoxaparin Injectable 40 milliGRAM(s) SubCutaneous <User Schedule>  glucagon  Injectable 1 milliGRAM(s) IntraMuscular once  insulin glargine Injectable (LANTUS) 15 Unit(s) SubCutaneous at bedtime  insulin lispro (ADMELOG) corrective regimen sliding scale   SubCutaneous three times a day before meals  insulin lispro (ADMELOG) corrective regimen sliding scale   SubCutaneous at bedtime  insulin lispro Injectable (ADMELOG) 10 Unit(s) SubCutaneous three times a day before meals  lacosamide Solution 100 milliGRAM(s) Oral two times a day  lisinopril 5 milliGRAM(s) Oral daily  pantoprazole  Injectable 40 milliGRAM(s) IV Push at bedtime  polyethylene glycol 3350 17 Gram(s) Oral every 12 hours  senna 2 Tablet(s) Oral at bedtime  sodium chloride 2 Gram(s) Oral every 6 hours    MEDICATIONS  (PRN):  acetaminophen     Tablet .. 650 milliGRAM(s) Oral every 6 hours PRN Temp greater or equal to 38C (100.4F), Mild Pain (1 - 3)  bisacodyl 5 milliGRAM(s) Oral daily PRN Constipation

## 2022-02-06 NOTE — PROVIDER CONTACT NOTE (OTHER) - ACTION/TREATMENT ORDERED:
no interventions at this time, hold insulin, give patient lunch and recheck sugar after
Ordered Treatment IVP Dextrose 12.5 Grams, D5W NS 0.9% @ 50 cc/hr, Repeat Blood Glucose

## 2022-02-06 NOTE — PROGRESS NOTE ADULT - SUBJECTIVE AND OBJECTIVE BOX
[facility] Division of Hospital Medicine  Ruth Ann MD  Spectra: [phone]      Patient is a 66y old  Male who presents with a chief complaint of Admitted 1/25 LT frontal brain tumor with cerebral edema and brain compression (05 Feb 2022 14:55)      SUBJECTIVE / OVERNIGHT EVENTS: no acute events overnight. no fever, chills, headache, dizziness nor lightheadedness. feels well without complaints.  ADDITIONAL REVIEW OF SYSTEMS:    MEDICATIONS  (STANDING):  atorvastatin 20 milliGRAM(s) Oral at bedtime  dexAMETHasone  Injectable 2 milliGRAM(s) IV Push every 8 hours  dextrose 40% Gel 15 Gram(s) Oral once  dextrose 5%. 1000 milliLiter(s) (50 mL/Hr) IV Continuous <Continuous>  dextrose 5%. 1000 milliLiter(s) (100 mL/Hr) IV Continuous <Continuous>  dextrose 50% Injectable 50 milliLiter(s) IV Push every 15 minutes  dextrose 50% Injectable 25 milliLiter(s) IV Push every 15 minutes  dextrose 50% Injectable 12.5 Gram(s) IV Push once  dextrose 50% Injectable 25 Gram(s) IV Push once  enoxaparin Injectable 40 milliGRAM(s) SubCutaneous <User Schedule>  glucagon  Injectable 1 milliGRAM(s) IntraMuscular once  insulin glargine Injectable (LANTUS) 15 Unit(s) SubCutaneous at bedtime  insulin lispro (ADMELOG) corrective regimen sliding scale   SubCutaneous three times a day before meals  insulin lispro (ADMELOG) corrective regimen sliding scale   SubCutaneous at bedtime  insulin lispro Injectable (ADMELOG) 10 Unit(s) SubCutaneous three times a day before meals  lacosamide Solution 100 milliGRAM(s) Oral two times a day  lisinopril 5 milliGRAM(s) Oral daily  pantoprazole  Injectable 40 milliGRAM(s) IV Push at bedtime  polyethylene glycol 3350 17 Gram(s) Oral every 12 hours  senna 2 Tablet(s) Oral at bedtime  sodium chloride 2 Gram(s) Oral every 6 hours    MEDICATIONS  (PRN):  acetaminophen     Tablet .. 650 milliGRAM(s) Oral every 6 hours PRN Temp greater or equal to 38C (100.4F), Mild Pain (1 - 3)  bisacodyl 5 milliGRAM(s) Oral daily PRN Constipation      CAPILLARY BLOOD GLUCOSE      POCT Blood Glucose.: 96 mg/dL (06 Feb 2022 12:24)  POCT Blood Glucose.: 73 mg/dL (06 Feb 2022 11:28)  POCT Blood Glucose.: 66 mg/dL (06 Feb 2022 11:26)  POCT Blood Glucose.: 164 mg/dL (06 Feb 2022 07:59)  POCT Blood Glucose.: 121 mg/dL (05 Feb 2022 22:43)  POCT Blood Glucose.: 87 mg/dL (05 Feb 2022 21:01)  POCT Blood Glucose.: 232 mg/dL (05 Feb 2022 16:24)    I&O's Summary    05 Feb 2022 07:01  -  06 Feb 2022 07:00  --------------------------------------------------------  IN: 120 mL / OUT: 1400 mL / NET: -1280 mL    06 Feb 2022 07:01  -  06 Feb 2022 12:34  --------------------------------------------------------  IN: 0 mL / OUT: 300 mL / NET: -300 mL        PHYSICAL EXAM:  Vital Signs Last 24 Hrs  T(C): 37 (06 Feb 2022 11:00), Max: 37 (06 Feb 2022 11:00)  T(F): 98.6 (06 Feb 2022 11:00), Max: 98.6 (06 Feb 2022 11:00)  HR: 63 (06 Feb 2022 11:00) (51 - 71)  BP: 102/67 (06 Feb 2022 11:00) (102/67 - 115/69)  BP(mean): --  RR: 18 (06 Feb 2022 11:00) (18 - 18)  SpO2: 98% (06 Feb 2022 11:00) (94% - 98%)    CONSTITUTIONAL: NAD, well-developed, well-groomed  EYES: PERRLA; conjunctiva and sclera clear  ENMT: Moist oral mucosa, no pharyngeal injection or exudates; normal dentition  NECK: Supple, no palpable masses; no thyromegaly  RESPIRATORY: Normal respiratory effort; lungs are clear to auscultation bilaterally  CARDIOVASCULAR: Regular rate and rhythm, normal S1 and S2, no murmur/rub/gallop; No lower extremity edema; Peripheral pulses are 2+ bilaterally  ABDOMEN: Soft, Nondistended, Nontender to palpation, normoactive bowel sounds  MUSCULOSKELETAL:  No clubbing or cyanosis of digits; no joint swelling or tenderness to palpation  PSYCH: A+O to person, place ([place]), and year if given time to answer  NEUROLOGY: no gross sensory deficits, 5/5 strength throughout  SKIN: +crani incision with staples, c/d/i    LABS:                        10.9   10.59 )-----------( 158      ( 06 Feb 2022 06:59 )             32.0     02-06    132<L>  |  98  |  20  ----------------------------<  179<H>  3.7   |  22  |  0.70    Ca    8.3<L>      06 Feb 2022 06:58  Phos  3.4     02-06  Mg     1.7     02-06        RADIOLOGY & ADDITIONAL TESTS:  Results Reviewed: no leukocytosis, hypokalemia and hypomagnesemia repleted, stable/improving hyponatremia  Imaging Personally Reviewed:  Electrocardiogram Personally Reviewed:    COORDINATION OF CARE:  Care Discussed with Consultants/Other Providers [Y]: Neurosurgery SERGIO Gonzalez  Prior or Outpatient Records Reviewed [Y/N]:

## 2022-02-06 NOTE — PROGRESS NOTE ADULT - PROBLEM SELECTOR PLAN 2
transient leukocytosis improved today.  - afebrile, non-toxic appearing  - would monitor WBC on CBC daily, ordered for CBC with diff tomorrow  - if febrile, would send infectious work-up but currently low suspicion for infection  - monitor off abx

## 2022-02-07 LAB
ANION GAP SERPL CALC-SCNC: 12 MMOL/L — SIGNIFICANT CHANGE UP (ref 5–17)
ANION GAP SERPL CALC-SCNC: 9 MMOL/L — SIGNIFICANT CHANGE UP (ref 5–17)
BUN SERPL-MCNC: 21 MG/DL — SIGNIFICANT CHANGE UP (ref 7–23)
BUN SERPL-MCNC: 24 MG/DL — HIGH (ref 7–23)
CALCIUM SERPL-MCNC: 8.4 MG/DL — SIGNIFICANT CHANGE UP (ref 8.4–10.5)
CALCIUM SERPL-MCNC: 8.4 MG/DL — SIGNIFICANT CHANGE UP (ref 8.4–10.5)
CHLORIDE SERPL-SCNC: 95 MMOL/L — LOW (ref 96–108)
CHLORIDE SERPL-SCNC: 97 MMOL/L — SIGNIFICANT CHANGE UP (ref 96–108)
CO2 SERPL-SCNC: 22 MMOL/L — SIGNIFICANT CHANGE UP (ref 22–31)
CO2 SERPL-SCNC: 24 MMOL/L — SIGNIFICANT CHANGE UP (ref 22–31)
CREAT SERPL-MCNC: 0.79 MG/DL — SIGNIFICANT CHANGE UP (ref 0.5–1.3)
CREAT SERPL-MCNC: 0.85 MG/DL — SIGNIFICANT CHANGE UP (ref 0.5–1.3)
GLUCOSE BLDC GLUCOMTR-MCNC: 105 MG/DL — HIGH (ref 70–99)
GLUCOSE BLDC GLUCOMTR-MCNC: 106 MG/DL — HIGH (ref 70–99)
GLUCOSE BLDC GLUCOMTR-MCNC: 182 MG/DL — HIGH (ref 70–99)
GLUCOSE BLDC GLUCOMTR-MCNC: 204 MG/DL — HIGH (ref 70–99)
GLUCOSE BLDC GLUCOMTR-MCNC: 219 MG/DL — HIGH (ref 70–99)
GLUCOSE SERPL-MCNC: 111 MG/DL — HIGH (ref 70–99)
GLUCOSE SERPL-MCNC: 224 MG/DL — HIGH (ref 70–99)
MAGNESIUM SERPL-MCNC: 1.8 MG/DL — SIGNIFICANT CHANGE UP (ref 1.6–2.6)
POTASSIUM SERPL-MCNC: 3.9 MMOL/L — SIGNIFICANT CHANGE UP (ref 3.5–5.3)
POTASSIUM SERPL-MCNC: 4.1 MMOL/L — SIGNIFICANT CHANGE UP (ref 3.5–5.3)
POTASSIUM SERPL-SCNC: 3.9 MMOL/L — SIGNIFICANT CHANGE UP (ref 3.5–5.3)
POTASSIUM SERPL-SCNC: 4.1 MMOL/L — SIGNIFICANT CHANGE UP (ref 3.5–5.3)
SODIUM SERPL-SCNC: 128 MMOL/L — LOW (ref 135–145)
SODIUM SERPL-SCNC: 131 MMOL/L — LOW (ref 135–145)
SURGICAL PATHOLOGY STUDY: SIGNIFICANT CHANGE UP

## 2022-02-07 PROCEDURE — 99233 SBSQ HOSP IP/OBS HIGH 50: CPT

## 2022-02-07 RX ORDER — DEXAMETHASONE 0.5 MG/5ML
2 ELIXIR ORAL
Refills: 0 | Status: DISCONTINUED | OUTPATIENT
Start: 2022-02-07 | End: 2022-02-08

## 2022-02-07 RX ADMIN — Medication 10 UNIT(S): at 07:52

## 2022-02-07 RX ADMIN — POLYETHYLENE GLYCOL 3350 17 GRAM(S): 17 POWDER, FOR SOLUTION ORAL at 18:43

## 2022-02-07 RX ADMIN — LACOSAMIDE 100 MILLIGRAM(S): 50 TABLET ORAL at 05:19

## 2022-02-07 RX ADMIN — Medication 10 UNIT(S): at 18:42

## 2022-02-07 RX ADMIN — PANTOPRAZOLE SODIUM 40 MILLIGRAM(S): 20 TABLET, DELAYED RELEASE ORAL at 21:01

## 2022-02-07 RX ADMIN — SODIUM CHLORIDE 2 GRAM(S): 9 INJECTION INTRAMUSCULAR; INTRAVENOUS; SUBCUTANEOUS at 00:02

## 2022-02-07 RX ADMIN — SODIUM CHLORIDE 2 GRAM(S): 9 INJECTION INTRAMUSCULAR; INTRAVENOUS; SUBCUTANEOUS at 18:43

## 2022-02-07 RX ADMIN — Medication 2 MILLIGRAM(S): at 18:46

## 2022-02-07 RX ADMIN — Medication 10 UNIT(S): at 11:43

## 2022-02-07 RX ADMIN — ENOXAPARIN SODIUM 40 MILLIGRAM(S): 100 INJECTION SUBCUTANEOUS at 18:42

## 2022-02-07 RX ADMIN — Medication 4: at 11:44

## 2022-02-07 RX ADMIN — POLYETHYLENE GLYCOL 3350 17 GRAM(S): 17 POWDER, FOR SOLUTION ORAL at 05:18

## 2022-02-07 RX ADMIN — INSULIN GLARGINE 15 UNIT(S): 100 INJECTION, SOLUTION SUBCUTANEOUS at 21:01

## 2022-02-07 RX ADMIN — SODIUM CHLORIDE 2 GRAM(S): 9 INJECTION INTRAMUSCULAR; INTRAVENOUS; SUBCUTANEOUS at 23:12

## 2022-02-07 RX ADMIN — SODIUM CHLORIDE 2 GRAM(S): 9 INJECTION INTRAMUSCULAR; INTRAVENOUS; SUBCUTANEOUS at 05:17

## 2022-02-07 RX ADMIN — SODIUM CHLORIDE 2 GRAM(S): 9 INJECTION INTRAMUSCULAR; INTRAVENOUS; SUBCUTANEOUS at 13:33

## 2022-02-07 RX ADMIN — SENNA PLUS 2 TABLET(S): 8.6 TABLET ORAL at 21:01

## 2022-02-07 RX ADMIN — LACOSAMIDE 100 MILLIGRAM(S): 50 TABLET ORAL at 18:42

## 2022-02-07 RX ADMIN — Medication 4: at 07:51

## 2022-02-07 RX ADMIN — Medication 2 MILLIGRAM(S): at 05:17

## 2022-02-07 RX ADMIN — ATORVASTATIN CALCIUM 20 MILLIGRAM(S): 80 TABLET, FILM COATED ORAL at 21:01

## 2022-02-07 NOTE — PROGRESS NOTE ADULT - PROBLEM SELECTOR PLAN 8
DVT: lovenox. LE duplex on 1/26 negative for DVT.  continue bowel regimen. patient is having BM.  Dispo: acute rehab    will follow periodically. call with questions.

## 2022-02-07 NOTE — PROGRESS NOTE ADULT - SUBJECTIVE AND OBJECTIVE BOX
[facility] Division of Hospital Medicine  Felicia Salas MD  spectra [phone]    Patient is a 66y old  Male who presents with a chief complaint of Admitted 1/25 LT frontal brain tumor with cerebral edema and brain compression (06 Feb 2022 16:55)      SUBJECTIVE / OVERNIGHT EVENTS: Patient seen and examined. He endorses mild HA but otherwise doing well.   ADDITIONAL REVIEW OF SYSTEMS:    MEDICATIONS  (STANDING):  atorvastatin 20 milliGRAM(s) Oral at bedtime  dexAMETHasone     Tablet 2 milliGRAM(s) Oral two times a day  dextrose 40% Gel 15 Gram(s) Oral once  dextrose 5%. 1000 milliLiter(s) (50 mL/Hr) IV Continuous <Continuous>  dextrose 5%. 1000 milliLiter(s) (100 mL/Hr) IV Continuous <Continuous>  dextrose 50% Injectable 50 milliLiter(s) IV Push every 15 minutes  dextrose 50% Injectable 25 milliLiter(s) IV Push every 15 minutes  dextrose 50% Injectable 12.5 Gram(s) IV Push once  dextrose 50% Injectable 25 Gram(s) IV Push once  enoxaparin Injectable 40 milliGRAM(s) SubCutaneous <User Schedule>  glucagon  Injectable 1 milliGRAM(s) IntraMuscular once  insulin glargine Injectable (LANTUS) 15 Unit(s) SubCutaneous at bedtime  insulin lispro (ADMELOG) corrective regimen sliding scale   SubCutaneous three times a day before meals  insulin lispro (ADMELOG) corrective regimen sliding scale   SubCutaneous at bedtime  insulin lispro Injectable (ADMELOG) 10 Unit(s) SubCutaneous three times a day before meals  lacosamide Solution 100 milliGRAM(s) Oral two times a day  pantoprazole  Injectable 40 milliGRAM(s) IV Push at bedtime  polyethylene glycol 3350 17 Gram(s) Oral every 12 hours  senna 2 Tablet(s) Oral at bedtime  sodium chloride 2 Gram(s) Oral every 6 hours    MEDICATIONS  (PRN):  acetaminophen     Tablet .. 650 milliGRAM(s) Oral every 6 hours PRN Temp greater or equal to 38C (100.4F), Mild Pain (1 - 3)  bisacodyl 5 milliGRAM(s) Oral daily PRN Constipation      CAPILLARY BLOOD GLUCOSE      POCT Blood Glucose.: 204 mg/dL (07 Feb 2022 11:33)  POCT Blood Glucose.: 219 mg/dL (07 Feb 2022 07:14)  POCT Blood Glucose.: 200 mg/dL (06 Feb 2022 21:15)  POCT Blood Glucose.: 241 mg/dL (06 Feb 2022 16:15)  POCT Blood Glucose.: 96 mg/dL (06 Feb 2022 12:24)    I&O's Summary    06 Feb 2022 07:01  -  07 Feb 2022 07:00  --------------------------------------------------------  IN: 240 mL / OUT: 1125 mL / NET: -885 mL    07 Feb 2022 07:01  -  07 Feb 2022 11:59  --------------------------------------------------------  IN: 0 mL / OUT: 200 mL / NET: -200 mL        PHYSICAL EXAM:  Vital Signs Last 24 Hrs  T(C): 36.5 (07 Feb 2022 11:46), Max: 37.1 (06 Feb 2022 15:41)  T(F): 97.7 (07 Feb 2022 11:46), Max: 98.8 (06 Feb 2022 15:41)  HR: 60 (07 Feb 2022 11:46) (51 - 64)  BP: 90/50 (07 Feb 2022 11:46) (90/50 - 108/62)  BP(mean): --  RR: 18 (07 Feb 2022 07:36) (18 - 18)  SpO2: 96% (07 Feb 2022 07:36) (95% - 96%)    CONSTITUTIONAL: NAD, well groomed  HEAD: staples across head, no sign of wound infection  NECK: Supple, no palpable masses; no thyromegaly  RESPIRATORY: CTABL, no wheezing or respiratory distress  CARDIOVASCULAR: normal S1 and S2, no murmur/rub/gallop; No lower extremity edema  ABDOMEN: soft, nt, nd, normoactive bowel sounds  MUSCULOSKELETAL: no clubbing or cyanosis of digits; no joint swelling or tenderness to palpation  PSYCH: AOx person, place, month  NEUROLOGY: follows simple commands, moves all extremities  SKIN: No rashes; no palpable lesions    LABS:                        10.9   10.59 )-----------( 158      ( 06 Feb 2022 06:59 )             32.0     02-07    128<L>  |  95<L>  |  21  ----------------------------<  224<H>  4.1   |  24  |  0.79    Ca    8.4      07 Feb 2022 06:09  Phos  3.4     02-06  Mg     1.8     02-07                  RADIOLOGY & ADDITIONAL TESTS:  Results Reviewed:   Imaging Personally Reviewed:  Electrocardiogram Personally Reviewed:    COORDINATION OF CARE:  Care Discussed with Consultants/Other Providers [Y/N]: neurosurgery PA(Lisa)  Prior or Outpatient Records Reviewed [Y/N]:

## 2022-02-07 NOTE — PROGRESS NOTE ADULT - PROBLEM SELECTOR PLAN 5
On metformin 1000mg bid at home; last a1c 10.7 on this admission  - patient passed S&S and started on PO diet  - decreased lantus to 15 units qHS and premeal to 10 units 2/6  - titrate insulin depending on FS trend (steroid tapered today again so will defer making any changes yet)

## 2022-02-07 NOTE — CONSULT NOTE ADULT - SUBJECTIVE AND OBJECTIVE BOX
[facility]  [phone]  NEPHROLOGY      INITIAL CONSULT NOTE  --------------------------------------------------------------------------------  HPI:    66M hx HTN/DM was [place] early January, was confused and not answering appropriately, MRI Jan 7 showed L frontal enhancing mass with significant edema. Admitted on 1/24; 1/27 s/p LT crani for left frontal brain tumor resection with Gleolan. pt noticed with hyponatremia nacl tab started. renal consult called today . pt states has been drinking over 2 liter of fluids a day       PAST HISTORY  --------------------------------------------------------------------------------  PAST MEDICAL & SURGICAL HISTORY:  Diabetes mellitus    Hypertension    No significant past surgical history      FAMILY HISTORY:    PAST SOCIAL HISTORY:    ALLERGIES & MEDICATIONS  --------------------------------------------------------------------------------  Allergies    No Known Allergies    Intolerances      Standing Inpatient Medications  atorvastatin 20 milliGRAM(s) Oral at bedtime  dexAMETHasone     Tablet 2 milliGRAM(s) Oral two times a day  dextrose 40% Gel 15 Gram(s) Oral once  dextrose 5%. 1000 milliLiter(s) IV Continuous <Continuous>  dextrose 5%. 1000 milliLiter(s) IV Continuous <Continuous>  dextrose 50% Injectable 50 milliLiter(s) IV Push every 15 minutes  dextrose 50% Injectable 25 milliLiter(s) IV Push every 15 minutes  dextrose 50% Injectable 12.5 Gram(s) IV Push once  dextrose 50% Injectable 25 Gram(s) IV Push once  enoxaparin Injectable 40 milliGRAM(s) SubCutaneous <User Schedule>  glucagon  Injectable 1 milliGRAM(s) IntraMuscular once  insulin glargine Injectable (LANTUS) 15 Unit(s) SubCutaneous at bedtime  insulin lispro (ADMELOG) corrective regimen sliding scale   SubCutaneous three times a day before meals  insulin lispro (ADMELOG) corrective regimen sliding scale   SubCutaneous at bedtime  insulin lispro Injectable (ADMELOG) 10 Unit(s) SubCutaneous three times a day before meals  lacosamide Solution 100 milliGRAM(s) Oral two times a day  pantoprazole  Injectable 40 milliGRAM(s) IV Push at bedtime  polyethylene glycol 3350 17 Gram(s) Oral every 12 hours  senna 2 Tablet(s) Oral at bedtime  sodium chloride 2 Gram(s) Oral every 6 hours    PRN Inpatient Medications  acetaminophen     Tablet .. 650 milliGRAM(s) Oral every 6 hours PRN  bisacodyl 5 milliGRAM(s) Oral daily PRN      REVIEW OF SYSTEMS  --------------------------------------------------------------------------------  Gen: No  fevers/chills   Skin: No rashes  Head/Eyes/Ears/Mouth: No headache; Normal hearing;  No sinus pain/discomfort, sore throat  Respiratory: No dyspnea, cough, wheezing  CV: No chest pain, orthopnea  GI: No abdominal pain, diarrhea, nausea, vomiting, melena  : No dysuria, decrease urination or hesitancy urinating  MSK: No joint pain/swelling; no back pain  Neuro: No dizziness/lightheadedness  also with no edema         VITALS/PHYSICAL EXAM  --------------------------------------------------------------------------------  T(C): 36.7 (02-07-22 @ 19:00), Max: 36.8 (02-06-22 @ 23:59)  HR: 61 (02-07-22 @ 19:00) (51 - 61)  BP: 117/71 (02-07-22 @ 19:00) (90/50 - 117/71)  RR: 18 (02-07-22 @ 19:00) (18 - 18)  SpO2: 97% (02-07-22 @ 19:00) (96% - 97%)  Wt(kg): --        02-06-22 @ 07:01  -  02-07-22 @ 07:00  --------------------------------------------------------  IN: 240 mL / OUT: 1125 mL / NET: -885 mL    02-07-22 @ 07:01  -  02-07-22 @ 22:00  --------------------------------------------------------  IN: 120 mL / OUT: 200 mL / NET: -80 mL      Physical Exam:  	Gen: Non toxic comfortable appearing  + scalp incision site dressing   	no jvd , supple neck,   	Pulm: decrease bs  no rales or ronchi or wheezing  	CV: RRR, S1S2; no rub  	Abd: +BS, soft, nontender/nondistended  	: No suprapubic tenderness  	UE: Warm, no cyanosis  no clubbing,  no edema; no asterixis  	LE: Warm, no cyanosis  no clubbing, no edema  	Neuro: alert and oriented. speech coherent   	    LABS/STUDIES  --------------------------------------------------------------------------------              10.9   10.59 >-----------<  158      [02-06-22 @ 06:59]              32.0     131  |  97  |  24  ----------------------------<  111      [02-07-22 @ 18:59]  3.9   |  22  |  0.85        Ca     8.4     [02-07-22 @ 18:59]      Mg     1.8     [02-07-22 @ 06:09]      Phos  3.4     [02-06-22 @ 06:58]            Creatinine Trend:  SCr 0.85 [02-07 @ 18:59]  SCr 0.79 [02-07 @ 06:09]  SCr 0.70 [02-06 @ 06:58]  SCr 0.82 [02-05 @ 07:00]  SCr 0.70 [02-04 @ 12:20]        TSH 4.15      [01-25-22 @ 10:16]

## 2022-02-07 NOTE — PROGRESS NOTE ADULT - ASSESSMENT
HPI: 66M hx HTN/DM was in the [place] for vacation early January, was confused and not answering appropriately, MRI Jan 7 showed L frontal enhancing mass with significant edema. Admitted on 1/24; 1/27 s/p LT crani for left frontal brain tumor resection with Gleolan.     PLAN:    Neuro:   - Neuro checks every 4 hours  - Continue vimpat 100 BID for seizure prophylaxis - on 1/27 had episode of lower extremity shaking but EEG was negative x 48 hrs  - Decadron for cerebral edema; slow taper   - Mobilization as tolerated    Respiratory:   - Tolerating room air  - Incentive spirometer    CV:  - Continue Lisinopril 5mg daily for HTN  - Continue Lipitor for history of HLD    GI:   - Tolerating CCD diet  - Continue miralax and senna   - Continue Protonix for GI prophylaxis while on steroids    Renal:   - will add 1L fluid restriction  - Voiding  - Replete lytes PRN  - Continue sodium chloride tabs 2g q6 for hyponatremia     Endocrine:   - Continue Lantus 15 units qHS, Admelog 10 qHS TID AC, moderate ISS for steroid induced hyperglycemia. Check fingersticks TID AC.     Heme/Onc:               - DVT ppx: sql and scds   - Neuro-onc follow up as outpatient for further treatment    ID:   - Afebrile     DISPO:   - PT/OT/PMR - Acute TBI upon d/c    - Will discuss with Dr. Gupta  - Chi # [phone]    Social/Family:   covid 2/6 negative    -will discuss with Dr. Gupta   -lorrie [phone]

## 2022-02-07 NOTE — PROGRESS NOTE ADULT - PROBLEM SELECTOR PLAN 3
overall improved s/p 2% NaCl IVF and salt tab but dropped again today  - c/w salt tabs 2g q6h  - fluid restrict  - renal consult regarding role for tolvaptan if no improvement

## 2022-02-07 NOTE — PROGRESS NOTE ADULT - SUBJECTIVE AND OBJECTIVE BOX
SUBJECTIVE: Patient was seen and evaluated at bedside. Patient is resting in bed and is in no new acute distress. Denies chest pain, shortness of breath, nausea/vomiting. Complains of headache improved with current pain medication regimen. ambulated halls with PT earlier.     Vital Signs Last 24 Hrs  T(C): 36.8 (07 Feb 2022 15:19), Max: 36.8 (06 Feb 2022 19:02)  T(F): 98.2 (07 Feb 2022 15:19), Max: 98.3 (06 Feb 2022 19:02)  HR: 57 (07 Feb 2022 15:19) (51 - 64)  BP: 102/63 (07 Feb 2022 15:19) (90/50 - 108/62)  BP(mean): --  RR: 18 (07 Feb 2022 15:19) (18 - 18)  SpO2: 96% (07 Feb 2022 15:19) (95% - 96%)    PHYSICAL EXAM:    General: No Acute Distress, resting comfortably in bed    Neurological: Awake, alert oriented to person, place and time, Following Commands, PERRL, EOMI, Face Symmetrical, Speech Fluent, Moving all extremities, Muscle Strength normal in all four extremities, mild right upper pronator drift, Sensation to Light Touch Intact    Incision: +bifrontal staples C/D/I    Pulmonary: Clear to Auscultation, No Rales, No Rhonchi, No Wheezes     Cardiovascular: S1, S2, Regular Rate and Rhythm     Gastrointestinal: Soft, Nontender, Nondistended       LABS:                            10.9   10.59 )-----------( 158      ( 06 Feb 2022 06:59 )             32.0   02-07    128<L>  |  95<L>  |  21  ----------------------------<  224<H>  4.1   |  24  |  0.79    Ca    8.4      07 Feb 2022 06:09  Phos  3.4     02-06  Mg     1.8     02-07    IMAGING:       ACC: [id number] EXAM:  CT BRAIN                          PROCEDURE DATE:  01/30/2022    INTERPRETATION:  Clinical indications: Status post surgery.  Multiple axial sections were performed from base skull to vertex without   contrast enhancement.    Postoperative changes compatible the high left frontal craniotomy is   again seen. Evaluation of the postop bed does demonstrate abnormal   low-attenuation though there are scattered areas of high attenuation   again seen which could be compatible with areas of hemorrhage at the   postop material. Extra-axial fluid/air is identified in the postoperative   region which measures approximately 1.6 cm in widest diameter. Overall   the amount of air has decreased when compared with the prior exam. This   finding previously measured approximately 1.3 cm in widest diameter. Area   of vasogenic edema around the postop region is again seen and unchanged.   Mass effect on the left lateral ventricle is again seen with   left-to-right shift at the C6 (9.6 mm).    Evaluation of the osseous structures with the appropriate window aside   from postop changes appear normal    Extra-axial soft tissue swelling and stables with associated air are seen   involving the high bifrontal region.    Left maxillary polyp versus retention cyst is seen    Both mastoid and middle ear regions appear clear.    IMPRESSION: Postop changes are again seen as described above.    --- End of Report ---    MEDICATIONS  (STANDING):  atorvastatin 20 milliGRAM(s) Oral at bedtime  dexAMETHasone     Tablet 2 milliGRAM(s) Oral two times a day  dextrose 40% Gel 15 Gram(s) Oral once  dextrose 5%. 1000 milliLiter(s) (50 mL/Hr) IV Continuous <Continuous>  dextrose 5%. 1000 milliLiter(s) (100 mL/Hr) IV Continuous <Continuous>  dextrose 50% Injectable 50 milliLiter(s) IV Push every 15 minutes  dextrose 50% Injectable 25 milliLiter(s) IV Push every 15 minutes  dextrose 50% Injectable 12.5 Gram(s) IV Push once  dextrose 50% Injectable 25 Gram(s) IV Push once  enoxaparin Injectable 40 milliGRAM(s) SubCutaneous <User Schedule>  glucagon  Injectable 1 milliGRAM(s) IntraMuscular once  insulin glargine Injectable (LANTUS) 15 Unit(s) SubCutaneous at bedtime  insulin lispro (ADMELOG) corrective regimen sliding scale   SubCutaneous three times a day before meals  insulin lispro (ADMELOG) corrective regimen sliding scale   SubCutaneous at bedtime  insulin lispro Injectable (ADMELOG) 10 Unit(s) SubCutaneous three times a day before meals  lacosamide Solution 100 milliGRAM(s) Oral two times a day  pantoprazole  Injectable 40 milliGRAM(s) IV Push at bedtime  polyethylene glycol 3350 17 Gram(s) Oral every 12 hours  senna 2 Tablet(s) Oral at bedtime  sodium chloride 2 Gram(s) Oral every 6 hours    MEDICATIONS  (PRN):  acetaminophen     Tablet .. 650 milliGRAM(s) Oral every 6 hours PRN Temp greater or equal to 38C (100.4F), Mild Pain (1 - 3)  bisacodyl 5 milliGRAM(s) Oral daily PRN Constipation

## 2022-02-07 NOTE — PROGRESS NOTE ADULT - PROBLEM SELECTOR PLAN 1
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

## 2022-02-07 NOTE — CONSULT NOTE ADULT - ASSESSMENT
66M hx HTN/DM was in [place] vacation early January, was confused and not answering appropriately, MRI Jan 7 showed L frontal enhancing mass with significant edema. Admitted on 1/24; 1/27 s/p LT crani for left frontal brain tumor resection with Gleolan. pt noticed with hyponatremia nacl tab started. pt states has been drinking over 2 liter of fluids a day     1- hyponatremia   2- anemia   3- hyperglycemia       hyponatremia in setting of siadh likely.   limited fluid intake to one liter a day as discussed with pt earlier. na is starting to improve  cont nacl 2 gram q6  trend hb   insulin for hyperglycemia  d/w NS team at bedside

## 2022-02-07 NOTE — PROGRESS NOTE ADULT - PROBLEM SELECTOR PLAN 2
transient leukocytosis improved on repeat.  - afebrile, non-toxic appearing  - if febrile, would send infectious work-up but currently low suspicion for infection  - monitor off abx

## 2022-02-08 ENCOUNTER — NON-APPOINTMENT (OUTPATIENT)
Age: 67
End: 2022-02-08

## 2022-02-08 ENCOUNTER — TRANSCRIPTION ENCOUNTER (OUTPATIENT)
Age: 67
End: 2022-02-08

## 2022-02-08 ENCOUNTER — INPATIENT (INPATIENT)
Facility: HOSPITAL | Age: 67
LOS: 0 days | Discharge: ANOTHER HOSPITAL | DRG: 949 | End: 2022-02-09
Attending: PSYCHIATRY & NEUROLOGY | Admitting: PSYCHIATRY & NEUROLOGY
Payer: COMMERCIAL

## 2022-02-08 VITALS
HEART RATE: 75 BPM | SYSTOLIC BLOOD PRESSURE: 111 MMHG | OXYGEN SATURATION: 97 % | RESPIRATION RATE: 18 BRPM | TEMPERATURE: 97 F | DIASTOLIC BLOOD PRESSURE: 59 MMHG

## 2022-02-08 VITALS
RESPIRATION RATE: 15 BRPM | HEART RATE: 78 BPM | TEMPERATURE: 98 F | WEIGHT: 162.48 LBS | SYSTOLIC BLOOD PRESSURE: 99 MMHG | DIASTOLIC BLOOD PRESSURE: 65 MMHG | OXYGEN SATURATION: 95 % | HEIGHT: 70 IN

## 2022-02-08 VITALS — WEIGHT: 162.48 LBS

## 2022-02-08 DIAGNOSIS — D43.2 NEOPLASM OF UNCERTAIN BEHAVIOR OF BRAIN, UNSPECIFIED: ICD-10-CM

## 2022-02-08 LAB
ALBUMIN SERPL ELPH-MCNC: 2.5 G/DL — LOW (ref 3.3–5)
ALP SERPL-CCNC: 74 U/L — SIGNIFICANT CHANGE UP (ref 40–120)
ALT FLD-CCNC: 32 U/L — SIGNIFICANT CHANGE UP (ref 10–45)
ANION GAP SERPL CALC-SCNC: 8 MMOL/L — SIGNIFICANT CHANGE UP (ref 5–17)
ANION GAP SERPL CALC-SCNC: 9 MMOL/L — SIGNIFICANT CHANGE UP (ref 5–17)
AST SERPL-CCNC: 13 U/L — SIGNIFICANT CHANGE UP (ref 10–40)
BASOPHILS # BLD AUTO: 0.01 K/UL — SIGNIFICANT CHANGE UP (ref 0–0.2)
BASOPHILS NFR BLD AUTO: 0.1 % — SIGNIFICANT CHANGE UP (ref 0–2)
BILIRUB SERPL-MCNC: 0.9 MG/DL — SIGNIFICANT CHANGE UP (ref 0.2–1.2)
BUN SERPL-MCNC: 20 MG/DL — SIGNIFICANT CHANGE UP (ref 7–23)
BUN SERPL-MCNC: 25 MG/DL — HIGH (ref 7–23)
CALCIUM SERPL-MCNC: 8.4 MG/DL — SIGNIFICANT CHANGE UP (ref 8.4–10.5)
CALCIUM SERPL-MCNC: 8.4 MG/DL — SIGNIFICANT CHANGE UP (ref 8.4–10.5)
CHLORIDE SERPL-SCNC: 97 MMOL/L — SIGNIFICANT CHANGE UP (ref 96–108)
CHLORIDE SERPL-SCNC: 99 MMOL/L — SIGNIFICANT CHANGE UP (ref 96–108)
CO2 SERPL-SCNC: 24 MMOL/L — SIGNIFICANT CHANGE UP (ref 22–31)
CO2 SERPL-SCNC: 26 MMOL/L — SIGNIFICANT CHANGE UP (ref 22–31)
CREAT SERPL-MCNC: 0.78 MG/DL — SIGNIFICANT CHANGE UP (ref 0.5–1.3)
CREAT SERPL-MCNC: 1.1 MG/DL — SIGNIFICANT CHANGE UP (ref 0.5–1.3)
EOSINOPHIL # BLD AUTO: 0.01 K/UL — SIGNIFICANT CHANGE UP (ref 0–0.5)
EOSINOPHIL NFR BLD AUTO: 0.1 % — SIGNIFICANT CHANGE UP (ref 0–6)
GLUCOSE BLDC GLUCOMTR-MCNC: 139 MG/DL — HIGH (ref 70–99)
GLUCOSE BLDC GLUCOMTR-MCNC: 166 MG/DL — HIGH (ref 70–99)
GLUCOSE BLDC GLUCOMTR-MCNC: 189 MG/DL — HIGH (ref 70–99)
GLUCOSE BLDC GLUCOMTR-MCNC: 80 MG/DL — SIGNIFICANT CHANGE UP (ref 70–99)
GLUCOSE SERPL-MCNC: 184 MG/DL — HIGH (ref 70–99)
GLUCOSE SERPL-MCNC: 224 MG/DL — HIGH (ref 70–99)
HCT VFR BLD CALC: 35.2 % — LOW (ref 39–50)
HGB BLD-MCNC: 12.1 G/DL — LOW (ref 13–17)
IMM GRANULOCYTES NFR BLD AUTO: 0.4 % — SIGNIFICANT CHANGE UP (ref 0–1.5)
LYMPHOCYTES # BLD AUTO: 1.42 K/UL — SIGNIFICANT CHANGE UP (ref 1–3.3)
LYMPHOCYTES # BLD AUTO: 14.3 % — SIGNIFICANT CHANGE UP (ref 13–44)
MCHC RBC-ENTMCNC: 31.7 PG — SIGNIFICANT CHANGE UP (ref 27–34)
MCHC RBC-ENTMCNC: 34.4 GM/DL — SIGNIFICANT CHANGE UP (ref 32–36)
MCV RBC AUTO: 92.1 FL — SIGNIFICANT CHANGE UP (ref 80–100)
MONOCYTES # BLD AUTO: 0.68 K/UL — SIGNIFICANT CHANGE UP (ref 0–0.9)
MONOCYTES NFR BLD AUTO: 6.8 % — SIGNIFICANT CHANGE UP (ref 2–14)
NEUTROPHILS # BLD AUTO: 7.8 K/UL — HIGH (ref 1.8–7.4)
NEUTROPHILS NFR BLD AUTO: 78.3 % — HIGH (ref 43–77)
NRBC # BLD: 0 /100 WBCS — SIGNIFICANT CHANGE UP (ref 0–0)
PLATELET # BLD AUTO: 147 K/UL — LOW (ref 150–400)
POTASSIUM SERPL-MCNC: 3.9 MMOL/L — SIGNIFICANT CHANGE UP (ref 3.5–5.3)
POTASSIUM SERPL-MCNC: 4.3 MMOL/L — SIGNIFICANT CHANGE UP (ref 3.5–5.3)
POTASSIUM SERPL-SCNC: 3.9 MMOL/L — SIGNIFICANT CHANGE UP (ref 3.5–5.3)
POTASSIUM SERPL-SCNC: 4.3 MMOL/L — SIGNIFICANT CHANGE UP (ref 3.5–5.3)
PROT SERPL-MCNC: 6 G/DL — SIGNIFICANT CHANGE UP (ref 6–8.3)
RBC # BLD: 3.82 M/UL — LOW (ref 4.2–5.8)
RBC # FLD: 13.2 % — SIGNIFICANT CHANGE UP (ref 10.3–14.5)
SODIUM SERPL-SCNC: 130 MMOL/L — LOW (ref 135–145)
SODIUM SERPL-SCNC: 133 MMOL/L — LOW (ref 135–145)
WBC # BLD: 9.96 K/UL — SIGNIFICANT CHANGE UP (ref 3.8–10.5)
WBC # FLD AUTO: 9.96 K/UL — SIGNIFICANT CHANGE UP (ref 3.8–10.5)

## 2022-02-08 PROCEDURE — 84443 ASSAY THYROID STIM HORMONE: CPT

## 2022-02-08 PROCEDURE — 92526 ORAL FUNCTION THERAPY: CPT

## 2022-02-08 PROCEDURE — 97129 THER IVNTJ 1ST 15 MIN: CPT

## 2022-02-08 PROCEDURE — 82565 ASSAY OF CREATININE: CPT

## 2022-02-08 PROCEDURE — 82962 GLUCOSE BLOOD TEST: CPT

## 2022-02-08 PROCEDURE — 85730 THROMBOPLASTIN TIME PARTIAL: CPT

## 2022-02-08 PROCEDURE — 36415 COLL VENOUS BLD VENIPUNCTURE: CPT

## 2022-02-08 PROCEDURE — 71045 X-RAY EXAM CHEST 1 VIEW: CPT

## 2022-02-08 PROCEDURE — U0005: CPT

## 2022-02-08 PROCEDURE — C9254: CPT

## 2022-02-08 PROCEDURE — C1889: CPT

## 2022-02-08 PROCEDURE — 92610 EVALUATE SWALLOWING FUNCTION: CPT

## 2022-02-08 PROCEDURE — 86901 BLOOD TYPING SEROLOGIC RH(D): CPT

## 2022-02-08 PROCEDURE — 97166 OT EVAL MOD COMPLEX 45 MIN: CPT

## 2022-02-08 PROCEDURE — 95714 VEEG EA 12-26 HR UNMNTR: CPT

## 2022-02-08 PROCEDURE — 97110 THERAPEUTIC EXERCISES: CPT

## 2022-02-08 PROCEDURE — 85018 HEMOGLOBIN: CPT

## 2022-02-08 PROCEDURE — 83735 ASSAY OF MAGNESIUM: CPT

## 2022-02-08 PROCEDURE — 85014 HEMATOCRIT: CPT

## 2022-02-08 PROCEDURE — 92523 SPEECH SOUND LANG COMPREHEN: CPT

## 2022-02-08 PROCEDURE — 88334 PATH CONSLTJ SURG CYTO XM EA: CPT

## 2022-02-08 PROCEDURE — 94003 VENT MGMT INPAT SUBQ DAY: CPT

## 2022-02-08 PROCEDURE — 97530 THERAPEUTIC ACTIVITIES: CPT

## 2022-02-08 PROCEDURE — 96365 THER/PROPH/DIAG IV INF INIT: CPT

## 2022-02-08 PROCEDURE — 85027 COMPLETE CBC AUTOMATED: CPT

## 2022-02-08 PROCEDURE — 99291 CRITICAL CARE FIRST HOUR: CPT | Mod: 25

## 2022-02-08 PROCEDURE — 95711 VEEG 2-12 HR UNMONITORED: CPT

## 2022-02-08 PROCEDURE — 70450 CT HEAD/BRAIN W/O DYE: CPT

## 2022-02-08 PROCEDURE — 83605 ASSAY OF LACTIC ACID: CPT

## 2022-02-08 PROCEDURE — C9399: CPT

## 2022-02-08 PROCEDURE — 88313 SPECIAL STAINS GROUP 2: CPT

## 2022-02-08 PROCEDURE — 88360 TUMOR IMMUNOHISTOCHEM/MANUAL: CPT

## 2022-02-08 PROCEDURE — 84100 ASSAY OF PHOSPHORUS: CPT

## 2022-02-08 PROCEDURE — 97116 GAIT TRAINING THERAPY: CPT

## 2022-02-08 PROCEDURE — A9585: CPT

## 2022-02-08 PROCEDURE — 84295 ASSAY OF SERUM SODIUM: CPT

## 2022-02-08 PROCEDURE — 88331 PATH CONSLTJ SURG 1 BLK 1SPC: CPT

## 2022-02-08 PROCEDURE — 70552 MRI BRAIN STEM W/DYE: CPT

## 2022-02-08 PROCEDURE — 86900 BLOOD TYPING SEROLOGIC ABO: CPT

## 2022-02-08 PROCEDURE — 97161 PT EVAL LOW COMPLEX 20 MIN: CPT

## 2022-02-08 PROCEDURE — 80048 BASIC METABOLIC PNL TOTAL CA: CPT

## 2022-02-08 PROCEDURE — 88342 IMHCHEM/IMCYTCHM 1ST ANTB: CPT

## 2022-02-08 PROCEDURE — 88307 TISSUE EXAM BY PATHOLOGIST: CPT

## 2022-02-08 PROCEDURE — 82803 BLOOD GASES ANY COMBINATION: CPT

## 2022-02-08 PROCEDURE — C1713: CPT

## 2022-02-08 PROCEDURE — 82330 ASSAY OF CALCIUM: CPT

## 2022-02-08 PROCEDURE — U0003: CPT

## 2022-02-08 PROCEDURE — 80053 COMPREHEN METABOLIC PANEL: CPT

## 2022-02-08 PROCEDURE — 84132 ASSAY OF SERUM POTASSIUM: CPT

## 2022-02-08 PROCEDURE — 82947 ASSAY GLUCOSE BLOOD QUANT: CPT

## 2022-02-08 PROCEDURE — 83036 HEMOGLOBIN GLYCOSYLATED A1C: CPT

## 2022-02-08 PROCEDURE — 84480 ASSAY TRIIODOTHYRONINE (T3): CPT

## 2022-02-08 PROCEDURE — C1769: CPT

## 2022-02-08 PROCEDURE — 94002 VENT MGMT INPAT INIT DAY: CPT

## 2022-02-08 PROCEDURE — 99232 SBSQ HOSP IP/OBS MODERATE 35: CPT

## 2022-02-08 PROCEDURE — 95700 EEG CONT REC W/VID EEG TECH: CPT

## 2022-02-08 PROCEDURE — 80076 HEPATIC FUNCTION PANEL: CPT

## 2022-02-08 PROCEDURE — 70553 MRI BRAIN STEM W/O & W/DYE: CPT

## 2022-02-08 PROCEDURE — 86850 RBC ANTIBODY SCREEN: CPT

## 2022-02-08 PROCEDURE — 93970 EXTREMITY STUDY: CPT

## 2022-02-08 PROCEDURE — 97164 PT RE-EVAL EST PLAN CARE: CPT

## 2022-02-08 PROCEDURE — 82435 ASSAY OF BLOOD CHLORIDE: CPT

## 2022-02-08 PROCEDURE — 84436 ASSAY OF TOTAL THYROXINE: CPT

## 2022-02-08 PROCEDURE — 85025 COMPLETE CBC W/AUTO DIFF WBC: CPT

## 2022-02-08 PROCEDURE — 85610 PROTHROMBIN TIME: CPT

## 2022-02-08 PROCEDURE — 88341 IMHCHEM/IMCYTCHM EA ADD ANTB: CPT

## 2022-02-08 RX ORDER — DEXTROSE 50 % IN WATER 50 %
25 SYRINGE (ML) INTRAVENOUS ONCE
Refills: 0 | Status: DISCONTINUED | OUTPATIENT
Start: 2022-02-08 | End: 2022-02-09

## 2022-02-08 RX ORDER — ACETAMINOPHEN 500 MG
650 TABLET ORAL EVERY 6 HOURS
Refills: 0 | Status: DISCONTINUED | OUTPATIENT
Start: 2022-02-08 | End: 2022-02-09

## 2022-02-08 RX ORDER — ATORVASTATIN CALCIUM 80 MG/1
1 TABLET, FILM COATED ORAL
Qty: 0 | Refills: 0 | DISCHARGE

## 2022-02-08 RX ORDER — DEXTROSE 50 % IN WATER 50 %
12.5 SYRINGE (ML) INTRAVENOUS ONCE
Refills: 0 | Status: DISCONTINUED | OUTPATIENT
Start: 2022-02-08 | End: 2022-02-09

## 2022-02-08 RX ORDER — INSULIN LISPRO 100/ML
8 VIAL (ML) SUBCUTANEOUS
Refills: 0 | Status: DISCONTINUED | OUTPATIENT
Start: 2022-02-08 | End: 2022-02-09

## 2022-02-08 RX ORDER — ATORVASTATIN CALCIUM 80 MG/1
20 TABLET, FILM COATED ORAL AT BEDTIME
Refills: 0 | Status: DISCONTINUED | OUTPATIENT
Start: 2022-02-08 | End: 2022-02-09

## 2022-02-08 RX ORDER — INSULIN LISPRO 100/ML
8 VIAL (ML) SUBCUTANEOUS
Refills: 0 | Status: DISCONTINUED | OUTPATIENT
Start: 2022-02-08 | End: 2022-02-08

## 2022-02-08 RX ORDER — INSULIN GLARGINE 100 [IU]/ML
12 INJECTION, SOLUTION SUBCUTANEOUS AT BEDTIME
Refills: 0 | Status: DISCONTINUED | OUTPATIENT
Start: 2022-02-08 | End: 2022-02-08

## 2022-02-08 RX ORDER — SENNA PLUS 8.6 MG/1
2 TABLET ORAL
Qty: 0 | Refills: 0 | DISCHARGE
Start: 2022-02-08

## 2022-02-08 RX ORDER — INSULIN LISPRO 100/ML
6 VIAL (ML) SUBCUTANEOUS
Refills: 0 | Status: DISCONTINUED | OUTPATIENT
Start: 2022-02-08 | End: 2022-02-08

## 2022-02-08 RX ORDER — INSULIN LISPRO 100/ML
VIAL (ML) SUBCUTANEOUS
Refills: 0 | Status: DISCONTINUED | OUTPATIENT
Start: 2022-02-08 | End: 2022-02-09

## 2022-02-08 RX ORDER — PANTOPRAZOLE SODIUM 20 MG/1
40 TABLET, DELAYED RELEASE ORAL
Qty: 0 | Refills: 0 | DISCHARGE
Start: 2022-02-08

## 2022-02-08 RX ORDER — LACOSAMIDE 50 MG/1
100 TABLET ORAL
Refills: 0 | Status: DISCONTINUED | OUTPATIENT
Start: 2022-02-08 | End: 2022-02-09

## 2022-02-08 RX ORDER — DEXAMETHASONE 0.5 MG/5ML
2 ELIXIR ORAL
Refills: 0 | Status: DISCONTINUED | OUTPATIENT
Start: 2022-02-08 | End: 2022-02-09

## 2022-02-08 RX ORDER — DEXAMETHASONE 0.5 MG/5ML
1 ELIXIR ORAL
Qty: 0 | Refills: 0 | DISCHARGE
Start: 2022-02-08

## 2022-02-08 RX ORDER — INSULIN LISPRO 100/ML
VIAL (ML) SUBCUTANEOUS AT BEDTIME
Refills: 0 | Status: DISCONTINUED | OUTPATIENT
Start: 2022-02-08 | End: 2022-02-09

## 2022-02-08 RX ORDER — SODIUM CHLORIDE 9 MG/ML
1000 INJECTION, SOLUTION INTRAVENOUS
Refills: 0 | Status: DISCONTINUED | OUTPATIENT
Start: 2022-02-08 | End: 2022-02-09

## 2022-02-08 RX ORDER — INSULIN GLARGINE 100 [IU]/ML
15 INJECTION, SOLUTION SUBCUTANEOUS AT BEDTIME
Refills: 0 | Status: DISCONTINUED | OUTPATIENT
Start: 2022-02-08 | End: 2022-02-09

## 2022-02-08 RX ORDER — POLYETHYLENE GLYCOL 3350 17 G/17G
17 POWDER, FOR SOLUTION ORAL EVERY 12 HOURS
Refills: 0 | Status: DISCONTINUED | OUTPATIENT
Start: 2022-02-08 | End: 2022-02-09

## 2022-02-08 RX ORDER — ATORVASTATIN CALCIUM 80 MG/1
1 TABLET, FILM COATED ORAL
Qty: 0 | Refills: 0 | DISCHARGE
Start: 2022-02-08

## 2022-02-08 RX ORDER — LACOSAMIDE 50 MG/1
10 TABLET ORAL
Qty: 0 | Refills: 0 | DISCHARGE
Start: 2022-02-08

## 2022-02-08 RX ORDER — LISINOPRIL 2.5 MG/1
1 TABLET ORAL
Qty: 0 | Refills: 0 | DISCHARGE

## 2022-02-08 RX ORDER — PANTOPRAZOLE SODIUM 20 MG/1
40 TABLET, DELAYED RELEASE ORAL AT BEDTIME
Refills: 0 | Status: DISCONTINUED | OUTPATIENT
Start: 2022-02-08 | End: 2022-02-08

## 2022-02-08 RX ORDER — INSULIN GLARGINE 100 [IU]/ML
15 INJECTION, SOLUTION SUBCUTANEOUS
Qty: 0 | Refills: 0 | DISCHARGE
Start: 2022-02-08

## 2022-02-08 RX ORDER — SENNA PLUS 8.6 MG/1
2 TABLET ORAL AT BEDTIME
Refills: 0 | Status: DISCONTINUED | OUTPATIENT
Start: 2022-02-08 | End: 2022-02-09

## 2022-02-08 RX ORDER — SODIUM CHLORIDE 9 MG/ML
2 INJECTION INTRAMUSCULAR; INTRAVENOUS; SUBCUTANEOUS EVERY 6 HOURS
Refills: 0 | Status: DISCONTINUED | OUTPATIENT
Start: 2022-02-08 | End: 2022-02-09

## 2022-02-08 RX ORDER — PETROLATUM,WHITE
1 JELLY (GRAM) TOPICAL DAILY
Refills: 0 | Status: DISCONTINUED | OUTPATIENT
Start: 2022-02-08 | End: 2022-02-09

## 2022-02-08 RX ORDER — INSULIN LISPRO 100/ML
6 VIAL (ML) SUBCUTANEOUS
Refills: 0 | Status: DISCONTINUED | OUTPATIENT
Start: 2022-02-08 | End: 2022-02-09

## 2022-02-08 RX ORDER — ENOXAPARIN SODIUM 100 MG/ML
40 INJECTION SUBCUTANEOUS
Qty: 0 | Refills: 0 | DISCHARGE
Start: 2022-02-08

## 2022-02-08 RX ORDER — INSULIN LISPRO 100/ML
8 VIAL (ML) SUBCUTANEOUS
Qty: 0 | Refills: 0 | DISCHARGE
Start: 2022-02-08

## 2022-02-08 RX ORDER — ENOXAPARIN SODIUM 100 MG/ML
40 INJECTION SUBCUTANEOUS
Refills: 0 | Status: DISCONTINUED | OUTPATIENT
Start: 2022-02-09 | End: 2022-02-09

## 2022-02-08 RX ORDER — METFORMIN HYDROCHLORIDE 850 MG/1
1 TABLET ORAL
Qty: 0 | Refills: 0 | DISCHARGE

## 2022-02-08 RX ORDER — PANTOPRAZOLE SODIUM 20 MG/1
40 TABLET, DELAYED RELEASE ORAL
Refills: 0 | Status: DISCONTINUED | OUTPATIENT
Start: 2022-02-08 | End: 2022-02-09

## 2022-02-08 RX ORDER — ACETAMINOPHEN 500 MG
2 TABLET ORAL
Qty: 0 | Refills: 0 | DISCHARGE
Start: 2022-02-08

## 2022-02-08 RX ORDER — SODIUM CHLORIDE 9 MG/ML
2 INJECTION INTRAMUSCULAR; INTRAVENOUS; SUBCUTANEOUS
Qty: 0 | Refills: 0 | DISCHARGE
Start: 2022-02-08

## 2022-02-08 RX ORDER — DEXTROSE 50 % IN WATER 50 %
15 SYRINGE (ML) INTRAVENOUS ONCE
Refills: 0 | Status: DISCONTINUED | OUTPATIENT
Start: 2022-02-08 | End: 2022-02-09

## 2022-02-08 RX ORDER — GLUCAGON INJECTION, SOLUTION 0.5 MG/.1ML
1 INJECTION, SOLUTION SUBCUTANEOUS ONCE
Refills: 0 | Status: DISCONTINUED | OUTPATIENT
Start: 2022-02-08 | End: 2022-02-09

## 2022-02-08 RX ORDER — ASPIRIN/CALCIUM CARB/MAGNESIUM 324 MG
1 TABLET ORAL
Qty: 0 | Refills: 0 | DISCHARGE

## 2022-02-08 RX ORDER — POLYETHYLENE GLYCOL 3350 17 G/17G
17 POWDER, FOR SOLUTION ORAL
Qty: 0 | Refills: 0 | DISCHARGE
Start: 2022-02-08

## 2022-02-08 RX ADMIN — ENOXAPARIN SODIUM 40 MILLIGRAM(S): 100 INJECTION SUBCUTANEOUS at 17:30

## 2022-02-08 RX ADMIN — SODIUM CHLORIDE 2 GRAM(S): 9 INJECTION INTRAMUSCULAR; INTRAVENOUS; SUBCUTANEOUS at 17:29

## 2022-02-08 RX ADMIN — ATORVASTATIN CALCIUM 20 MILLIGRAM(S): 80 TABLET, FILM COATED ORAL at 22:35

## 2022-02-08 RX ADMIN — LACOSAMIDE 100 MILLIGRAM(S): 50 TABLET ORAL at 05:32

## 2022-02-08 RX ADMIN — SODIUM CHLORIDE 2 GRAM(S): 9 INJECTION INTRAMUSCULAR; INTRAVENOUS; SUBCUTANEOUS at 22:35

## 2022-02-08 RX ADMIN — SENNA PLUS 2 TABLET(S): 8.6 TABLET ORAL at 22:35

## 2022-02-08 RX ADMIN — Medication 2: at 08:51

## 2022-02-08 RX ADMIN — Medication 2 MILLIGRAM(S): at 05:33

## 2022-02-08 RX ADMIN — POLYETHYLENE GLYCOL 3350 17 GRAM(S): 17 POWDER, FOR SOLUTION ORAL at 05:32

## 2022-02-08 RX ADMIN — Medication 2 MILLIGRAM(S): at 17:29

## 2022-02-08 RX ADMIN — SODIUM CHLORIDE 2 GRAM(S): 9 INJECTION INTRAMUSCULAR; INTRAVENOUS; SUBCUTANEOUS at 05:32

## 2022-02-08 RX ADMIN — SODIUM CHLORIDE 2 GRAM(S): 9 INJECTION INTRAMUSCULAR; INTRAVENOUS; SUBCUTANEOUS at 12:21

## 2022-02-08 RX ADMIN — Medication 10 UNIT(S): at 08:51

## 2022-02-08 RX ADMIN — Medication 6 UNIT(S): at 12:21

## 2022-02-08 RX ADMIN — INSULIN GLARGINE 15 UNIT(S): 100 INJECTION, SOLUTION SUBCUTANEOUS at 22:36

## 2022-02-08 RX ADMIN — LACOSAMIDE 100 MILLIGRAM(S): 50 TABLET ORAL at 17:30

## 2022-02-08 RX ADMIN — POLYETHYLENE GLYCOL 3350 17 GRAM(S): 17 POWDER, FOR SOLUTION ORAL at 17:30

## 2022-02-08 NOTE — H&P ADULT - HISTORY OF PRESENT ILLNESS
66 year old male with PMH of HTN, DM type 2, HLD, known brain mass. [place] was [place] early January, was confused and not answering appropriately, MRI Jan 7 showed Left frontal enhancing mass with significant edema. He presented to [facility] on 1/24 for further evaluation and repeat CTH today showed significant vasogenic edema and 1.5cm MLS. He underwent 1/27/22 for left sided craniotomy for tumor resection with gleolan. Post op course was complicated by possible seizures with EEG negative. Hyponatermia with salt tabs added to regimen.  66 year old male with PMH of HTN, DM type 2, HLD, known brain mass. [place] was [place] early January, was confused and not answering appropriately, MRI Jan 7 showed Left frontal enhancing mass with significant edema. He presented to [facility] on 1/24 for further evaluation and repeat CTH today showed significant vasogenic edema and 1.5cm MLS. He underwent 1/27/22 for left sided craniotomy for tumor resection with gleolan. Post op course was complicated by possible seizures with EEG negative. Hyponatermia with salt tabs added to regimen. Pathology for tumor returned as gliosarcoma and will require outpatient follow up with heme/onc. He was evaluated by PMR and was recommended for acute inpatient rehab. He was admitted to [facility] on 2/8/22.

## 2022-02-08 NOTE — PROGRESS NOTE ADULT - PROBLEM SELECTOR PROBLEM 1
GBM (glioblastoma multiforme)

## 2022-02-08 NOTE — H&P ADULT - ASSESSMENT
ASSESSMENT/PLAN  66 year old male with PMH of HTN, DM type 2, HLD, known brain mass. [place] was [place] early January, was confused and not answering appropriately, MRI Jan 7 showed Left frontal enhancing mass with significant edema. He presented to [facility] on 1/24 for further evaluation and repeat CTH today showed significant vasogenic edema and 1.5cm MLS. He underwent 1/27/22 for left sided craniotomy for tumor resection with gleolan. Post op course was complicated by possible seizures with EEG negative. Hyponatermia with salt tabs added to regimen. Pathology for tumor returned as gliosarcoma and will require outpatient follow up with heme/onc. He was evaluated by PMR and was recommended for acute inpatient rehab. He was admitted to [facility] on 2/8/22.     #Left frontal tumor s/p craniotomy for resection with right sided drift, Gait Instability, ADL impairments and Functional impairments  - Start Comprehensive Rehab Program of PT/OT/SLP  - Monitor incision -staples to be removed 2/10   -Continue Vimpat 100mg BID for seizure prophylaxis  -Continue Decadron slow taper- now on 2mg Q12h - to maintain until follow up with neurosurgery   -Will require follow up with neuro-oncology     #Hyponatermia  -Continue fluid restriction  -Continue salt tabs 2G Q6h    #HTN  -Continue lisinopril 5mg daily    #HLD   -Continue atorvastatin 20mg daily    #Type 2 DM  - A1c is 10.7 (1/25)  -FS and ISS  -Continue Lantus 15 units at bedtime  - continue premeal admelog 8U at breakfast and dinner and 6U at lunch      #Pain control  - Tylenol PRN    #GI/Bowel Mgmt   - Continue Senna at bedtime   - Miralax BID    #Bladder management  - Continue to monitor PVR q 8 hours (SC if > 400)  -Monitor UO    #DVT  - Lovenox  - SCDs  - TEDs     #Skin:  -No active issues at this time    FEN   - Diet - Easy to Chew+ Thins  [CCHO, DASH/TLC]    - Dysphagia  SLP - evaluation and treatment    Precautions / PROPHYLAXIS:   - Falls, Cardiac, Seizure   - ortho: Weight bearing status: WBAT   - Lungs: Aspiration, Incentive Spirometer   - Pressure injury/Skin: Turn Q2hrs while in bed, OOB to Chair, PT/OT      Parker Gupta)  Neurosurgery  [address] [place] [address]  Phone: [phone]  Fax: [phone]  Follow Up Time:     Matthew Hill)  Neurology  [facility], [address] [place] [address]  Phone: [phone]  Fax: [phone]  Follow Up Time:     Kristopher Guevara  RADIATION ONCOLOGY  [address] [place] [address] A [place] [address]  Phone: [phone]  Fax: [phone]      MEDICAL PROGNOSIS: GOOD              REHAB POTENTIAL: GOOD              ESTIMATED DISPOSITION: HOME WITH HOME CARE              ELOS: 10-14 Days   EXPECTED THERAPY:     P.T. 1hr/day       O.T. 1hr/day      S.L.P. 1hr/day       P&O Unnecessary       EXP FREQUENCY: 5 days per 7 day period     PRESCREEN COMPARISON:   I have reviewed the prescreen information and I have found no relevant changes between the preadmission screening and my post admission evaluation     RATIONALE FOR INPATIENT ADMISSION - Patient demonstrates the following: (check all that apply)  [X] Medically appropriate for rehabilitation admission  [X] Has attainable rehab goals with an appropriate initial discharge plan  [X] Has rehabilitation potential (expected to make a significant improvement within a reasonable period of time)   [X] Requires close medical management by a rehab physician, rehab nursing care, Hospitalist and comprehensive interdisciplinary team (including PT, OT, & or SLP, Prosthetics and Orthotics)   ASSESSMENT/PLAN  66 year old male with PMH of HTN, DM type 2, HLD, known brain mass. [place] early January, was confused and not answering appropriately, MRI Jan 7 showed Left frontal enhancing mass with significant edema. He presented to [facility] on 1/24 for further evaluation and repeat CTH today showed significant vasogenic edema and 1.5cm MLS. He underwent 1/27/22 for left sided craniotomy for tumor resection with gleolan. Post op course was complicated by possible seizures with EEG negative. Hyponatermia with salt tabs added to regimen. Pathology for tumor returned as gliosarcoma and will require outpatient follow up with heme/onc. He was evaluated by PMR and was recommended for acute inpatient rehab. He was admitted to [facility] on 2/8/22.     #Left frontal tumor s/p craniotomy for resection with right sided drift, Gait Instability, ADL impairments and Functional impairments  - Start Comprehensive Rehab Program of PT/OT/SLP  - Monitor incision -staples to be removed 2/10   -Continue Vimpat 100mg BID for seizure prophylaxis  -Continue Decadron slow taper- now on 2mg Q12h - to maintain until follow up with neurosurgery   -Will require follow up with neuro-oncology     #Hyponatermia  -Continue fluid restriction  -Continue salt tabs 2G Q6h    #HTN  -Continue lisinopril 5mg daily    #HLD   -Continue atorvastatin 20mg daily    #Type 2 DM  - A1c is 10.7 (1/25)  -FS and ISS  -Continue Lantus 15 units at bedtime  - continue premeal admelog 8U at breakfast and dinner and 6U at lunch      #Pain control  - Tylenol PRN    #GI/Bowel Mgmt   - Continue Senna at bedtime   - Miralax BID    #Bladder management  - Continue to monitor PVR q 8 hours (SC if > 400)  -Monitor UO    #DVT  - Lovenox  - SCDs  - TEDs     #Skin:  -Incisional wound care per nursing. Surgical staples in place, wound intact    FEN   - Diet - Easy to Chew+ Thins  [CCHO, DASH/TLC]    - Dysphagia  SLP - evaluation and treatment    Precautions / PROPHYLAXIS:   - Falls, Cardiac, Seizure   - ortho: Weight bearing status: WBAT   - Lungs: Aspiration, Incentive Spirometer   - Pressure injury/Skin: Turn Q2hrs while in bed, OOB to Chair, PT/OT      Parker Gupta)  Neurosurgery  [address] [place] [address]  Phone: [phone]  Fax: [phone]  Follow Up Time:     Matthew Hill)  Neurology  [facility], [address] [place] [address]  Phone: [phone]  Fax: [phone]  Follow Up Time:     Kristopher Guevara  RADIATION ONCOLOGY  [address] [place] [address] - [place] [address]  Phone: [phone]  Fax: [phone]      MEDICAL PROGNOSIS: GOOD              REHAB POTENTIAL: GOOD              ESTIMATED DISPOSITION: HOME WITH HOME CARE              ELOS: 10-14 Days   EXPECTED THERAPY:     P.T. 1hr/day       O.T. 1hr/day      S.L.P. 1hr/day       P&O Unnecessary       EXP FREQUENCY: 5 days per 7 day period     PRESCREEN COMPARISON:   I have reviewed the prescreen information and I have found no relevant changes between the preadmission screening and my post admission evaluation     RATIONALE FOR INPATIENT ADMISSION - Patient demonstrates the following: (check all that apply)  [X] Medically appropriate for rehabilitation admission  [X] Has attainable rehab goals with an appropriate initial discharge plan  [X] Has rehabilitation potential (expected to make a significant improvement within a reasonable period of time)   [X] Requires close medical management by a rehab physician, rehab nursing care, Hospitalist and comprehensive interdisciplinary team (including PT, OT, & or SLP, Prosthetics and Orthotics)

## 2022-02-08 NOTE — PROGRESS NOTE ADULT - PROVIDER SPECIALTY LIST ADULT
NSICU
Neurosurgery
Neurosurgery
Anesthesia
NSICU
Neurosurgery
Rehab Medicine
Rehab Medicine
Internal Medicine
NSICU
Neurosurgery
NSICU
Neurosurgery
Hospitalist
Internal Medicine
Hospitalist
Hospitalist
Internal Medicine

## 2022-02-08 NOTE — H&P ADULT - NSHPSOCIALHISTORY_GEN_ALL_CORE
SOCIAL HISTORY  Smoking - Denied  EtOH - Denied   Drugs - Denied    FUNCTIONAL HISTORY:   Lives alone in apartment w steps.  PTA Independent    CURRENT FUNCTIONAL STATUS:  Mod A bed mob and transfers

## 2022-02-08 NOTE — H&P ADULT - NSHPLABSRESULTS_GEN_ALL_CORE
LABS:      02-08    130<L>  |  97  |  20  ----------------------------<  184<H>  3.9   |  24  |  0.78    Ca    8.4      08 Feb 2022 06:15  Mg     1.8     02-07        < from: CT Head No Cont (01.24.22 @ 19:35) >    IMPRESSION:  Extensive lesion and surrounding vasogenic edema in left frontal and   temporal lobes with involvement of the corpus callosum and medial left   frontal lobe with marked ventricular effacement and 1.4 cm left to right   midline shift.  Consider submitting prior exams for comparison.    < end of copied text >    < from: MR Brain Stereotactic w/ IV Cont (01.26.22 @ 15:21) >    IMPRESSION: Large inferior frontal intra-axial irregularly ring-enhancing   mass with mass effect and vasogenic edema suspicious for high-grade   glioma. Midline shift to the right and mild hydrocephalus.    < end of copied text >    < from: CT Head No Cont (01.30.22 @ 09:32) >    IMPRESSION: Postop changes are again seen as described above.    < end of copied text >    < from: Xray Chest 1 View- PORTABLE-Urgent (Xray Chest 1 View- PORTABLE-Urgent .) (02.01.22 @ 18:03) >        < end of copied text >

## 2022-02-08 NOTE — PROGRESS NOTE ADULT - PROBLEM SELECTOR PLAN 5
On metformin 1000mg bid at home; last a1c 10.7 on this admission  - patient passed S&S and started on PO diet  - decrease lantus to 12 units qHS and premeal to 6 units on discharge  - titrate insulin depending on FS trend On metformin 1000mg bid at home; last a1c 10.7 on this admission  - patient passed S&S and started on PO diet  - decrease lantus to 12 units qHS and premeal to 6 units TID on discharge  - titrate insulin depending on FS trend

## 2022-02-08 NOTE — H&P ADULT - NSHPREVIEWOFSYSTEMS_GEN_ALL_CORE
REVIEW OF SYSTEMS  Constitutional: No fever, No Chills, No fatigue  HEENT: No eye pain, No visual disturbances, No difficulty hearing  Pulm: No cough,  No shortness of breath  Cardio: No chest pain, No palpitations  GI:  No abdominal pain, No nausea, No vomiting, No diarrhea, No constipation  : No dysuria, No frequency, No hematuria  Neuro: No headaches, No memory loss, +loss of strength, No numbness, No tremors  Skin: No itching, No rashes, No lesions   Endo: No temperature intolerance  MSK: No joint pain, No joint swelling, No muscle pain, No Neck or back pain  Psych:  No depression, No anxiety REVIEW OF SYSTEMS  Constitutional: No fever, No Chills, No fatigue  HEENT: No eye pain, +Double vision, No difficulty hearing  Pulm: No shortness of breath +Mild intermittent cough  Cardio: No chest pain, No palpitations  GI:  No abdominal pain, No nausea, No vomiting, No diarrhea, No constipation +Last BM this morning  : No dysuria, No frequency, No hematuria  Neuro: No memory loss, No numbness, No tremors +Headache +Subjective weakness RLE  Skin: No itching, No rashes, No lesions   Endo: No temperature intolerance  MSK: No joint pain, No joint swelling, No muscle pain, No Neck or back pain  Psych:  No depression, No anxiety

## 2022-02-08 NOTE — PROGRESS NOTE ADULT - SUBJECTIVE AND OBJECTIVE BOX
[facility] Division of Hospital Medicine  Felicia Salas MD  spectra [id number]    Patient is a 66y old  Male who presents with a chief complaint of 1/27/22 s/p Left craniotomy for left frontal brain tumor resection with Gleolan.  (08 Feb 2022 11:39)      SUBJECTIVE / OVERNIGHT EVENTS:  ADDITIONAL REVIEW OF SYSTEMS:    MEDICATIONS  (STANDING):  atorvastatin 20 milliGRAM(s) Oral at bedtime  dexAMETHasone     Tablet 2 milliGRAM(s) Oral two times a day  dextrose 40% Gel 15 Gram(s) Oral once  dextrose 5%. 1000 milliLiter(s) (50 mL/Hr) IV Continuous <Continuous>  dextrose 5%. 1000 milliLiter(s) (100 mL/Hr) IV Continuous <Continuous>  dextrose 50% Injectable 50 milliLiter(s) IV Push every 15 minutes  dextrose 50% Injectable 25 milliLiter(s) IV Push every 15 minutes  dextrose 50% Injectable 12.5 Gram(s) IV Push once  dextrose 50% Injectable 25 Gram(s) IV Push once  enoxaparin Injectable 40 milliGRAM(s) SubCutaneous <User Schedule>  glucagon  Injectable 1 milliGRAM(s) IntraMuscular once  insulin glargine Injectable (LANTUS) 15 Unit(s) SubCutaneous at bedtime  insulin lispro (ADMELOG) corrective regimen sliding scale   SubCutaneous three times a day before meals  insulin lispro (ADMELOG) corrective regimen sliding scale   SubCutaneous at bedtime  insulin lispro Injectable (ADMELOG) 8 Unit(s) SubCutaneous with breakfast  insulin lispro Injectable (ADMELOG) 6 Unit(s) SubCutaneous with lunch  insulin lispro Injectable (ADMELOG) 8 Unit(s) SubCutaneous with dinner  lacosamide Solution 100 milliGRAM(s) Oral two times a day  pantoprazole  Injectable 40 milliGRAM(s) IV Push at bedtime  polyethylene glycol 3350 17 Gram(s) Oral every 12 hours  senna 2 Tablet(s) Oral at bedtime  sodium chloride 2 Gram(s) Oral every 6 hours    MEDICATIONS  (PRN):  acetaminophen     Tablet .. 650 milliGRAM(s) Oral every 6 hours PRN Temp greater or equal to 38C (100.4F), Mild Pain (1 - 3)  bisacodyl 5 milliGRAM(s) Oral daily PRN Constipation      CAPILLARY BLOOD GLUCOSE      POCT Blood Glucose.: 139 mg/dL (08 Feb 2022 11:48)  POCT Blood Glucose.: 166 mg/dL (08 Feb 2022 08:40)  POCT Blood Glucose.: 182 mg/dL (07 Feb 2022 20:53)  POCT Blood Glucose.: 106 mg/dL (07 Feb 2022 18:40)  POCT Blood Glucose.: 105 mg/dL (07 Feb 2022 16:15)    I&O's Summary    07 Feb 2022 07:01  -  08 Feb 2022 07:00  --------------------------------------------------------  IN: 460 mL / OUT: 500 mL / NET: -40 mL    08 Feb 2022 07:01  -  08 Feb 2022 13:39  --------------------------------------------------------  IN: 240 mL / OUT: 275 mL / NET: -35 mL        PHYSICAL EXAM:  Vital Signs Last 24 Hrs  T(C): 36.6 (08 Feb 2022 11:55), Max: 36.8 (07 Feb 2022 15:19)  T(F): 97.8 (08 Feb 2022 11:55), Max: 98.3 (08 Feb 2022 08:42)  HR: 70 (08 Feb 2022 11:55) (57 - 70)  BP: 90/59 (08 Feb 2022 11:55) (90/59 - 117/71)  BP(mean): --  RR: 18 (08 Feb 2022 11:55) (18 - 18)  SpO2: 95% (08 Feb 2022 11:55) (95% - 98%)    CONSTITUTIONAL: NAD, well-developed, well-groomed  NECK: Supple, no palpable masses; no thyromegaly  RESPIRATORY: Normal respiratory effort; lungs are clear to auscultation bilaterally  CARDIOVASCULAR: normal S1 and S2, no murmur/rub/gallop; No lower extremity edema  ABDOMEN: Nontender to palpation, normoactive bowel sounds, no rebound/guarding; No hepatosplenomegaly  MUSCULOSKELETAL:  no clubbing or cyanosis of digits; no joint swelling or tenderness to palpation  PSYCH: A+O to person, place, and time; affect appropriate   SKIN: No rashes; no palpable lesions    LABS:    02-08    130<L>  |  97  |  20  ----------------------------<  184<H>  3.9   |  24  |  0.78    Ca    8.4      08 Feb 2022 06:15  Mg     1.8     02-07                  RADIOLOGY & ADDITIONAL TESTS:  Results Reviewed:   Imaging Personally Reviewed:  Electrocardiogram Personally Reviewed:    COORDINATION OF CARE:  Care Discussed with Consultants/Other Providers [Y/N]:  Prior or Outpatient Records Reviewed [Y/N]:   [facility] Division of Hospital Medicine  Felicia Salas MD  spectra [id number]    Patient is a 66y old  Male who presents with a chief complaint of 1/27/22 s/p Left craniotomy for left frontal brain tumor resection with Gleolan.  (08 Feb 2022 11:39)      SUBJECTIVE / OVERNIGHT EVENTS: patient denies any HA. tolerating diet.   ADDITIONAL REVIEW OF SYSTEMS:    MEDICATIONS  (STANDING):  atorvastatin 20 milliGRAM(s) Oral at bedtime  dexAMETHasone     Tablet 2 milliGRAM(s) Oral two times a day  dextrose 40% Gel 15 Gram(s) Oral once  dextrose 5%. 1000 milliLiter(s) (50 mL/Hr) IV Continuous <Continuous>  dextrose 5%. 1000 milliLiter(s) (100 mL/Hr) IV Continuous <Continuous>  dextrose 50% Injectable 50 milliLiter(s) IV Push every 15 minutes  dextrose 50% Injectable 25 milliLiter(s) IV Push every 15 minutes  dextrose 50% Injectable 12.5 Gram(s) IV Push once  dextrose 50% Injectable 25 Gram(s) IV Push once  enoxaparin Injectable 40 milliGRAM(s) SubCutaneous <User Schedule>  glucagon  Injectable 1 milliGRAM(s) IntraMuscular once  insulin glargine Injectable (LANTUS) 15 Unit(s) SubCutaneous at bedtime  insulin lispro (ADMELOG) corrective regimen sliding scale   SubCutaneous three times a day before meals  insulin lispro (ADMELOG) corrective regimen sliding scale   SubCutaneous at bedtime  insulin lispro Injectable (ADMELOG) 8 Unit(s) SubCutaneous with breakfast  insulin lispro Injectable (ADMELOG) 6 Unit(s) SubCutaneous with lunch  insulin lispro Injectable (ADMELOG) 8 Unit(s) SubCutaneous with dinner  lacosamide Solution 100 milliGRAM(s) Oral two times a day  pantoprazole  Injectable 40 milliGRAM(s) IV Push at bedtime  polyethylene glycol 3350 17 Gram(s) Oral every 12 hours  senna 2 Tablet(s) Oral at bedtime  sodium chloride 2 Gram(s) Oral every 6 hours    MEDICATIONS  (PRN):  acetaminophen     Tablet .. 650 milliGRAM(s) Oral every 6 hours PRN Temp greater or equal to 38C (100.4F), Mild Pain (1 - 3)  bisacodyl 5 milliGRAM(s) Oral daily PRN Constipation      CAPILLARY BLOOD GLUCOSE      POCT Blood Glucose.: 139 mg/dL (08 Feb 2022 11:48)  POCT Blood Glucose.: 166 mg/dL (08 Feb 2022 08:40)  POCT Blood Glucose.: 182 mg/dL (07 Feb 2022 20:53)  POCT Blood Glucose.: 106 mg/dL (07 Feb 2022 18:40)  POCT Blood Glucose.: 105 mg/dL (07 Feb 2022 16:15)    I&O's Summary    07 Feb 2022 07:01  -  08 Feb 2022 07:00  --------------------------------------------------------  IN: 460 mL / OUT: 500 mL / NET: -40 mL    08 Feb 2022 07:01  -  08 Feb 2022 13:39  --------------------------------------------------------  IN: 240 mL / OUT: 275 mL / NET: -35 mL        PHYSICAL EXAM:  Vital Signs Last 24 Hrs  T(C): 36.6 (08 Feb 2022 11:55), Max: 36.8 (07 Feb 2022 15:19)  T(F): 97.8 (08 Feb 2022 11:55), Max: 98.3 (08 Feb 2022 08:42)  HR: 70 (08 Feb 2022 11:55) (57 - 70)  BP: 90/59 (08 Feb 2022 11:55) (90/59 - 117/71)  BP(mean): --  RR: 18 (08 Feb 2022 11:55) (18 - 18)  SpO2: 95% (08 Feb 2022 11:55) (95% - 98%)    CONSTITUTIONAL: NAD, well groomed  HEAD: staples across head, no sign of wound infection  NECK: Supple, no palpable masses; no thyromegaly  RESPIRATORY: CTABL, no wheezing or respiratory distress  CARDIOVASCULAR: normal S1 and S2, no murmur/rub/gallop; No lower extremity edema  ABDOMEN: soft, nt, nd, normoactive bowel sounds  MUSCULOSKELETAL: no clubbing or cyanosis of digits; no joint swelling or tenderness to palpation  PSYCH: AOx person, place, month/year  NEUROLOGY: follows simple commands, moves all extremities  SKIN: No rashes; no palpable lesions    LABS:    02-08    130<L>  |  97  |  20  ----------------------------<  184<H>  3.9   |  24  |  0.78    Ca    8.4      08 Feb 2022 06:15  Mg     1.8     02-07                  RADIOLOGY & ADDITIONAL TESTS:  Results Reviewed:   Imaging Personally Reviewed:  Electrocardiogram Personally Reviewed:    COORDINATION OF CARE:  Care Discussed with Consultants/Other Providers [Y/N]: neurosurgery PA(Lisa)  Prior or Outpatient Records Reviewed [Y/N]:

## 2022-02-08 NOTE — PROGRESS NOTE ADULT - PROBLEM SELECTOR PLAN 8
DVT: lovenox. LE duplex on 1/26 negative for DVT.  continue bowel regimen. patient is having BM.  Dispo: acute rehab    will follow periodically. call with questions. DVT: lovenox. LE duplex on 1/26 negative for DVT.  continue bowel regimen. patient is having BM.  Dispo: d/c to acute rehab today    will follow periodically. call with questions. DVT: lovenox. LE duplex on 1/26 negative for DVT.  continue bowel regimen  Dispo: d/c to acute rehab today    will follow periodically. call with questions.

## 2022-02-08 NOTE — DISCHARGE NOTE PROVIDER - NSDCFUADDAPPT_GEN_ALL_CORE_FT
Dr Gupta- neurosurgeon- please call office for appointment upon discharge from rehab.   Neuro- oncologist Dr Hill- please call office for appointment upon discharge from rehab.   Radiation Medicine [phone]- please call office for appointment upon discharge from rehab.

## 2022-02-08 NOTE — PROGRESS NOTE ADULT - SUBJECTIVE AND OBJECTIVE BOX
Patient is a 66y old  Male who presents with a chief complaint of GBM (01 Feb 2022 01:36)    Patient tolerating therapies.  Min A transfers; CS gait.  No CP, No SOB  Daughter at bedside.    MEDICATIONS  (STANDING):  atorvastatin 20 milliGRAM(s) Oral at bedtime  dexAMETHasone     Tablet 2 milliGRAM(s) Oral two times a day  dextrose 40% Gel 15 Gram(s) Oral once  dextrose 5%. 1000 milliLiter(s) (50 mL/Hr) IV Continuous <Continuous>  dextrose 5%. 1000 milliLiter(s) (100 mL/Hr) IV Continuous <Continuous>  dextrose 50% Injectable 50 milliLiter(s) IV Push every 15 minutes  dextrose 50% Injectable 25 milliLiter(s) IV Push every 15 minutes  dextrose 50% Injectable 12.5 Gram(s) IV Push once  dextrose 50% Injectable 25 Gram(s) IV Push once  enoxaparin Injectable 40 milliGRAM(s) SubCutaneous <User Schedule>  glucagon  Injectable 1 milliGRAM(s) IntraMuscular once  insulin glargine Injectable (LANTUS) 15 Unit(s) SubCutaneous at bedtime  insulin lispro (ADMELOG) corrective regimen sliding scale   SubCutaneous three times a day before meals  insulin lispro (ADMELOG) corrective regimen sliding scale   SubCutaneous at bedtime  insulin lispro Injectable (ADMELOG) 10 Unit(s) SubCutaneous three times a day before meals  lacosamide Solution 100 milliGRAM(s) Oral two times a day  pantoprazole  Injectable 40 milliGRAM(s) IV Push at bedtime  polyethylene glycol 3350 17 Gram(s) Oral every 12 hours  senna 2 Tablet(s) Oral at bedtime  sodium chloride 2 Gram(s) Oral every 6 hours    MEDICATIONS  (PRN):  acetaminophen     Tablet .. 650 milliGRAM(s) Oral every 6 hours PRN Temp greater or equal to 38C (100.4F), Mild Pain (1 - 3)  bisacodyl 5 milliGRAM(s) Oral daily PRN Constipation    Vital Signs Last 24 Hrs  T(C): 36.8 (08 Feb 2022 08:42), Max: 36.8 (07 Feb 2022 15:19)  T(F): 98.3 (08 Feb 2022 08:42), Max: 98.3 (08 Feb 2022 08:42)  HR: 60 (08 Feb 2022 08:42) (57 - 61)  BP: 97/63 (08 Feb 2022 08:42) (90/50 - 117/71)  BP(mean): --  RR: 18 (08 Feb 2022 08:42) (18 - 18)  SpO2: 96% (08 Feb 2022 08:42) (96% - 98%)    PHYSICAL EXAM  Constitutional - NAD, Comfortable  HEENT - Incision intact  Extremities - No C/C/E, No calf tenderness   Neurologic Exam -                 AAOx2  Improved cognition- AAO x 2  Motor 5/5 bl UE/LEs  no clonus     Psychiatric - Mood stable, Affect WNL    02-08    130<L>  |  97  |  20  ----------------------------<  184<H>  3.9   |  24  |  0.78    Ca    8.4      08 Feb 2022 06:15  Mg     1.8     02-07      Impression:  67 yo with functional deficits secondary to diagnosis of Brain Mass    Plan:  PT- ROM, Bed Mob, Transfers, Amb w AD and bracing as needed  OT- ADLs, bracing  SLP- Dysphagia eval and treat  Prec- Falls, Cardiac  Monitor Na+  DVT Prophylaxis- Lovenox  Skin- Turn q2 h  Dispo- Acute TBI Rehab- can tolerate 3h/d of therapies and requires daily physician visits

## 2022-02-08 NOTE — PROGRESS NOTE ADULT - PROBLEM SELECTOR PLAN 3
overall improved s/p 2% NaCl IVF and salt tab but dropped again today  - c/w salt tabs 2g q6h  - fluid restrict  - renal consult regarding role for tolvaptan if no improvement overall improved s/p 2% NaCl IVF and salt tab but dropped again on 2/7  - c/w salt tabs 2g q6h  - fluid restrict  - renal consult appreciated  - continue with fluid restriction and salt tab for now  - repeat BMP with improved hyponatremia.

## 2022-02-08 NOTE — PATIENT PROFILE ADULT - FALL HARM RISK - HARM RISK INTERVENTIONS
Assistance with ambulation/Assistance OOB with selected safe patient handling equipment/Communicate Risk of Fall with Harm to all staff/Discuss with provider need for PT consult/Monitor for mental status changes/Monitor gait and stability/Provide patient with walking aids - walker, cane, crutches/Reinforce activity limits and safety measures with patient and family/Reorient to person, place and time as needed/Review medications for side effects contributing to fall risk/Sit up slowly, dangle for a short time, stand at bedside before walking/Tailored Fall Risk Interventions/Use of alarms - bed, chair and/or voice tab/Visual Cue: Yellow wristband and red socks/Bed in lowest position, wheels locked, appropriate side rails in place/Call bell, personal items and telephone in reach/Instruct patient to call for assistance before getting out of bed or chair/Non-slip footwear when patient is out of bed/[place] to call system/Physically safe environment - no spills, clutter or unnecessary equipment/Purposeful Proactive Rounding/Room/bathroom lighting operational, light cord in reach

## 2022-02-08 NOTE — DISCHARGE NOTE PROVIDER - CARE PROVIDERS DIRECT ADDRESSES
[email or web address]

## 2022-02-08 NOTE — DISCHARGE NOTE PROVIDER - NSDCCPCAREPLAN_GEN_ALL_CORE_FT
PRINCIPAL DISCHARGE DIAGNOSIS  Diagnosis: Brain mass  Assessment and Plan of Treatment: 1/27/22 s/p Left craniotomy for left frontal brain tumor resection with Foster.   Dr Gupta- neurosurgeon- please call office for appointment upon discharge from rehab.   Neuro- oncology and radiation medicine upon discharge from rehab.      SECONDARY DISCHARGE DIAGNOSES  Diagnosis: Hyperlipidemia  Assessment and Plan of Treatment: Please continue medications and follow up with your primary care physician upon discharge from rehab.    Diagnosis: Hyponatremia  Assessment and Plan of Treatment: continue 1L fluid restriction and salt tabs, please check Na in 3-5 days. Wean salt tabs as tolerated.    Diagnosis: Hypertension  Assessment and Plan of Treatment: Please continue medications and follow up with your primary care physician upon discharge from rehab.    Diagnosis: DM (diabetes mellitus), type 2  Assessment and Plan of Treatment: HGA1C 10.7, please adjust insulins as needed.

## 2022-02-08 NOTE — DISCHARGE NOTE PROVIDER - HOSPITAL COURSE
HPI:  66M hx HTN/DM [place] early January, was confused and not answering appropriately, MRI Jan 7 showed L frontal enhancing mass with significant edema. Repeat CTH today showed significant vasogenic edema and 1.5cm MLS. 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 He had routine post operative care in NSCU and then transferred to the floor. Course complicated by hyponatremia- improved with salt tabs and hypertonic fluids and steroid induced hyperglycemia- improved with insulin adjustments.  Patient seen and followed in consultation by hospitalist medicine. Nephrology consulted for hyponatremia as well- improved with fluid restriction. PT/OT/PMR evaluated him and recommended acute rehab. On the day of discharge he was medically and neurologically stable for discharge to rehab. He will likely require adjuvant chemo/RT after discharge from rehab.

## 2022-02-08 NOTE — DISCHARGE NOTE NURSING/CASE MANAGEMENT/SOCIAL WORK - DATE OF LAST VACCINATION
Remote transmission received from patient's monitor at home.       Current ECG illustrates NSR.     Episodes since 05.20.2021:  2 Tachy episodes noted illustrating NSR/ST with Twave oversensing.     EP physician will review. See PACEART report under Cardiology tab.      Follow up 1 month via carelink.
08-Feb-2022

## 2022-02-08 NOTE — DISCHARGE NOTE PROVIDER - NSDCMRMEDTOKEN_GEN_ALL_CORE_FT
acetaminophen 325 mg oral tablet: 2 tab(s) orally every 6 hours, As needed, Temp greater or equal to 38C (100.4F), Mild Pain (1 - 3)  atorvastatin 20 mg oral tablet: 1 tab(s) orally once a day (at bedtime)  bisacodyl 5 mg oral delayed release tablet: 1 tab(s) orally once a day, As needed, Constipation  dexamethasone 2 mg oral tablet: 1 tab(s) orally 2 times a day, do not taper further until outpt follow up  enoxaparin: 40 milligram(s) subcutaneous once a day (at bedtime)  insulin glargine: 15 unit(s) subcutaneous once a day (at bedtime)  insulin lispro 100 units/mL injectable solution: 8 unit(s) SQ before breakfast, 6Units SQ before lunch, 8Units SQ before dinner with sliding scale per protocol.   lacosamide 10 mg/mL oral solution: 10 milliliter(s) orally 2 times a day  pantoprazole 40 mg intravenous injection: 40 milligram(s) intravenous once a day (at bedtime)  polyethylene glycol 3350 oral powder for reconstitution: 17 gram(s) orally every 12 hours  senna oral tablet: 2 tab(s) orally once a day (at bedtime)  sodium chloride 1 g oral tablet: 2 tab(s) orally every 6 hours

## 2022-02-08 NOTE — PROGRESS NOTE ADULT - REASON FOR ADMISSION
Brain tumor
Left frontal GBM
Admitted 1/25 LT frontal brain tumor with cerebral edema and brain compression
GBM
Left frontal GBM
Left frontal tumor
Left frontal tumor
Patient was admitted with left frontal mass.
Admitted 1/25 LT frontal brain tumor with cerebral edema and brain compression
GBM

## 2022-02-08 NOTE — PROGRESS NOTE ADULT - PROBLEM SELECTOR PLAN 1
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

## 2022-02-08 NOTE — DISCHARGE NOTE NURSING/CASE MANAGEMENT/SOCIAL WORK - PATIENT PORTAL LINK FT
You can access the FollowMyHealth Patient Portal offered by [facility] by registering at the following website: [email or web address]. By joining [employer]’s FollowMyHealth portal, you will also be able to view your health information using other applications (apps) compatible with our system.

## 2022-02-08 NOTE — PROGRESS NOTE ADULT - ASSESSMENT
HPI: 66M hx HTN/DM was [place] early January, was confused and not answering appropriately, MRI Jan 7 showed L frontal enhancing mass with significant edema. Admitted on 1/24; 1/27 s/p LT crani for left frontal brain tumor resection with Gleolan.     PLAN:    Neuro:   - Neuro checks every 4 hours  - Continue vimpat 100 BID for seizure prophylaxis - on 1/27 had episode of lower extremity shaking but EEG was negative x 48 hrs  - Decadron for cerebral edema; slow taper   - Mobilization as tolerated    Respiratory:   - Tolerating room air  - Incentive spirometer    CV:  - Continue Lisinopril 5mg daily for HTN  - Continue Lipitor for history of HLD    GI:   - Tolerating CCD diet  - Continue miralax and senna   - Continue Protonix for GI prophylaxis while on steroids    Renal:   - will cont 1L fluid restriction for hyponatremia, improving  - Voiding  - Replete lytes PRN  - Continue sodium chloride tabs 2g q6 for hyponatremia     Endocrine:   - Continue Lantus at 12 units qHS, Admelog 10 qHS TID AC, moderate ISS for steroid induced hyperglycemia. Check fingersticks TID AC.     Heme/Onc:               - DVT ppx: sql and scds   - Neuro-onc follow up as outpatient for further treatment    ID:   - Afebrile     DISPO:   - PT/OT/PMR - Acute TBI upon d/c, bed available today    - Will discuss with Dr. Gupta  - Chi # [phone]    Social/Family:   covid 2/6 negative    -will discuss with Dr. Gupta   -lorrie [phone]

## 2022-02-08 NOTE — DISCHARGE NOTE NURSING/CASE MANAGEMENT/SOCIAL WORK - NSDCPEFALRISK_GEN_ALL_CORE
For information on Fall & Injury Prevention, visit: [email or web address] OR  [email or web address] OR  https://www.cdc.gov/steadi/patient.html

## 2022-02-08 NOTE — DISCHARGE NOTE PROVIDER - CARE PROVIDER_API CALL
Parker Gupta)  Neurosurgery  [address] [place] [address]  Phone: [phone]  Fax: [phone]  Follow Up Time:     Matthew Hill)  Neurology  [facility], [address] [place] [address]  Phone: [phone]  Fax: [phone]  Follow Up Time:     Kristopher Guevara  RADIATION ONCOLOGY  [address] [place] [address] - [place] [address]  Phone: [phone]  Fax: [phone]  Follow Up Time:

## 2022-02-08 NOTE — DISCHARGE NOTE PROVIDER - NSDCFUADDINST_GEN_ALL_CORE_FT
please notify physician if fevers, bleeding, swelling, pain not relieved by medication, increased sluggishness or irritability, increased nausea or vomiting, inability to tolerate foods or liquids, new or increasing weakness/numbness/tingling.   please do not engage in strenuous activity, heavy lifting, drive, or return to work or school until cleared by surgeon.   please keep incision clean and dry, do not submerge wound in water for prolonged periods of time, pat dry after showering, and do not use any creams or ointments to incision.   Wound- staple removal for POD #14 on 2/10/22.   Please do not take NSAIDs- ie. advil, motrin, ibuprofen, aleve, aspirin, naproxen, etc. Tylenol/ acetaminophen ok to take.

## 2022-02-08 NOTE — H&P ADULT - NSHPPHYSICALEXAM_GEN_ALL_CORE
Constitutional - NAD, Comfortable  HEENT - NCAT, EOMI. +Poor maxillary dentition +Hoarse voice  Neck - Supple, No limited ROM  Chest - good chest expansion, good respiratory effort, CTAB   Cardio - warm and well perfused, RRR, no murmur  Abdomen -  Soft, NTND  Extremities - No peripheral edema, No calf tenderness +chronic traumatic R digit amputation  Neurologic Exam:                    Cognitive -             Orientation: Awake, Alert, Oriented to self, month, "hospital"            Memory: Unable to correctly describe specific events leading up to acute hospitalization, unclear on his diagnosis            Thought: process, content appropriate     Speech - Fluent, Comprehensible, No dysarthria, No aphasia +Hoarse voice     Cranial Nerves - No facial asymmetry, Tongue midline, EOMI, Shoulder shrug intact     Motor -                      LEFT    UE - ShAB 5/5, EF 5/5, EE 5/5, WE 5/5,  WNL                    RIGHT UE - ShAB 5/5, EF 5/5, EE 4/5, WE 5/5,  WNL                    LEFT    LE - HF 5/5, KE 5/5, DF 5/5, PF 5/5                    RIGHT LE - HF 5/5, KE 5/5, DF 5/5, PF 5/5        Sensory - Intact to LT bilateral     Reflexes - DTR 2 / 4 , neg Garcia's b/l, neg Babinski's b/l     Coordination - FTN intact     OculoVestibular -  No nystagmus  Psychiatric - Mood stable, Affect WNL  Skin on admission: +Coronal cranial incision with surgical staples CDI w/ some dried blood  +Dry skin bilateral feet

## 2022-02-08 NOTE — PROGRESS NOTE ADULT - SUBJECTIVE AND OBJECTIVE BOX
SUBJECTIVE: Patient was seen and evaluated at bedside. Patient is resting in bed and is in no new acute distress. Denies chest pain, shortness of breath, nausea/vomiting. Complains of headache improved with current pain medication regimen. ambulated halls with PT earlier. Awaiting rehab placement.     Vital Signs Last 24 Hrs  T(C): 36.3 (08 Feb 2022 15:00), Max: 36.8 (08 Feb 2022 08:42)  T(F): 97.3 (08 Feb 2022 15:00), Max: 98.3 (08 Feb 2022 08:42)  HR: 75 (08 Feb 2022 15:00) (59 - 75)  BP: 111/59 (08 Feb 2022 15:00) (90/59 - 117/71)  BP(mean): --  RR: 18 (08 Feb 2022 15:00) (18 - 18)  SpO2: 97% (08 Feb 2022 15:00) (95% - 98%)    PHYSICAL EXAM:    General: No Acute Distress, resting comfortably in bed    Neurological: Awake, alert oriented to person, place and time, Following Commands, PERRL, EOMI, Face Symmetrical, Speech Fluent, Moving all extremities, Muscle Strength normal in all four extremities, mild right upper pronator drift, Sensation to Light Touch Intact    Incision: +bifrontal staples C/D/I    Pulmonary: Clear to Auscultation, No Rales, No Rhonchi, No Wheezes     Cardiovascular: S1, S2, Regular Rate and Rhythm     Gastrointestinal: Soft, Nontender, Nondistended       LABS:           02-08    130<L>  |  97  |  20  ----------------------------<  184<H>  3.9   |  24  |  0.78    Ca    8.4      08 Feb 2022 06:15  Mg     1.8     02-07      IMAGING:       ACC: [id number] EXAM:  CT BRAIN                          PROCEDURE DATE:  01/30/2022    INTERPRETATION:  Clinical indications: Status post surgery.  Multiple axial sections were performed from base skull to vertex without   contrast enhancement.    Postoperative changes compatible the high left frontal craniotomy is   again seen. Evaluation of the postop bed does demonstrate abnormal   low-attenuation though there are scattered areas of high attenuation   again seen which could be compatible with areas of hemorrhage at the   postop material. Extra-axial fluid/air is identified in the postoperative   region which measures approximately 1.6 cm in widest diameter. Overall   the amount of air has decreased when compared with the prior exam. This   finding previously measured approximately 1.3 cm in widest diameter. Area   of vasogenic edema around the postop region is again seen and unchanged.   Mass effect on the left lateral ventricle is again seen with   left-to-right shift at the C6 (9.6 mm).    Evaluation of the osseous structures with the appropriate window aside   from postop changes appear normal    Extra-axial soft tissue swelling and stables with associated air are seen   involving the high bifrontal region.    Left maxillary polyp versus retention cyst is seen    Both mastoid and middle ear regions appear clear.    IMPRESSION: Postop changes are again seen as described above.    --- End of Report ---    MEDICATIONS  (STANDING):  atorvastatin 20 milliGRAM(s) Oral at bedtime  dexAMETHasone     Tablet 2 milliGRAM(s) Oral two times a day  dextrose 40% Gel 15 Gram(s) Oral once  dextrose 5%. 1000 milliLiter(s) (50 mL/Hr) IV Continuous <Continuous>  dextrose 5%. 1000 milliLiter(s) (100 mL/Hr) IV Continuous <Continuous>  dextrose 50% Injectable 50 milliLiter(s) IV Push every 15 minutes  dextrose 50% Injectable 25 milliLiter(s) IV Push every 15 minutes  dextrose 50% Injectable 12.5 Gram(s) IV Push once  dextrose 50% Injectable 25 Gram(s) IV Push once  enoxaparin Injectable 40 milliGRAM(s) SubCutaneous <User Schedule>  glucagon  Injectable 1 milliGRAM(s) IntraMuscular once  insulin glargine Injectable (LANTUS) 15 Unit(s) SubCutaneous at bedtime  insulin lispro (ADMELOG) corrective regimen sliding scale   SubCutaneous three times a day before meals  insulin lispro (ADMELOG) corrective regimen sliding scale   SubCutaneous at bedtime  insulin lispro Injectable (ADMELOG) 10 Unit(s) SubCutaneous three times a day before meals  lacosamide Solution 100 milliGRAM(s) Oral two times a day  pantoprazole  Injectable 40 milliGRAM(s) IV Push at bedtime  polyethylene glycol 3350 17 Gram(s) Oral every 12 hours  senna 2 Tablet(s) Oral at bedtime  sodium chloride 2 Gram(s) Oral every 6 hours    MEDICATIONS  (PRN):  acetaminophen     Tablet .. 650 milliGRAM(s) Oral every 6 hours PRN Temp greater or equal to 38C (100.4F), Mild Pain (1 - 3)  bisacodyl 5 milliGRAM(s) Oral daily PRN Constipation

## 2022-02-09 ENCOUNTER — INPATIENT (INPATIENT)
Facility: HOSPITAL | Age: 67
LOS: 1 days | Discharge: NURSING, REHAB OR LONG-TERM CARE | DRG: 155 | End: 2022-02-11
Attending: INTERNAL MEDICINE | Admitting: INTERNAL MEDICINE
Payer: COMMERCIAL

## 2022-02-09 VITALS
OXYGEN SATURATION: 95 % | DIASTOLIC BLOOD PRESSURE: 61 MMHG | SYSTOLIC BLOOD PRESSURE: 98 MMHG | RESPIRATION RATE: 16 BRPM | HEART RATE: 72 BPM | TEMPERATURE: 98 F

## 2022-02-09 DIAGNOSIS — Z98.890 OTHER SPECIFIED POSTPROCEDURAL STATES: Chronic | ICD-10-CM

## 2022-02-09 DIAGNOSIS — D43.2 NEOPLASM OF UNCERTAIN BEHAVIOR OF BRAIN, UNSPECIFIED: ICD-10-CM

## 2022-02-09 PROBLEM — I10 ESSENTIAL (PRIMARY) HYPERTENSION: Chronic | Status: ACTIVE | Noted: 2022-01-24

## 2022-02-09 LAB
GLUCOSE BLDC GLUCOMTR-MCNC: 130 MG/DL — HIGH (ref 70–99)
GLUCOSE BLDC GLUCOMTR-MCNC: 185 MG/DL — HIGH (ref 70–99)
GLUCOSE BLDC GLUCOMTR-MCNC: 206 MG/DL — HIGH (ref 70–99)
GLUCOSE BLDC GLUCOMTR-MCNC: 89 MG/DL — SIGNIFICANT CHANGE UP (ref 70–99)
HCV AB S/CO SERPL IA: 0.09 S/CO — SIGNIFICANT CHANGE UP (ref 0–0.99)
HCV AB SERPL-IMP: SIGNIFICANT CHANGE UP
SARS-COV-2 RNA SPEC QL NAA+PROBE: DETECTED
SARS-COV-2 RNA SPEC QL NAA+PROBE: SIGNIFICANT CHANGE UP

## 2022-02-09 PROCEDURE — 80053 COMPREHEN METABOLIC PANEL: CPT

## 2022-02-09 PROCEDURE — 82962 GLUCOSE BLOOD TEST: CPT

## 2022-02-09 PROCEDURE — 36415 COLL VENOUS BLD VENIPUNCTURE: CPT

## 2022-02-09 PROCEDURE — 87635 SARS-COV-2 COVID-19 AMP PRB: CPT

## 2022-02-09 PROCEDURE — 99223 1ST HOSP IP/OBS HIGH 75: CPT

## 2022-02-09 PROCEDURE — 85025 COMPLETE CBC W/AUTO DIFF WBC: CPT

## 2022-02-09 PROCEDURE — 71045 X-RAY EXAM CHEST 1 VIEW: CPT | Mod: 26

## 2022-02-09 PROCEDURE — 86803 HEPATITIS C AB TEST: CPT

## 2022-02-09 RX ORDER — PANTOPRAZOLE SODIUM 20 MG/1
40 TABLET, DELAYED RELEASE ORAL
Refills: 0 | Status: DISCONTINUED | OUTPATIENT
Start: 2022-02-09 | End: 2022-02-11

## 2022-02-09 RX ORDER — ATORVASTATIN CALCIUM 80 MG/1
1 TABLET, FILM COATED ORAL
Qty: 0 | Refills: 0 | DISCHARGE
Start: 2022-02-09

## 2022-02-09 RX ORDER — PETROLATUM,WHITE
1 JELLY (GRAM) TOPICAL DAILY
Refills: 0 | Status: DISCONTINUED | OUTPATIENT
Start: 2022-02-09 | End: 2022-02-09

## 2022-02-09 RX ORDER — DEXAMETHASONE 0.5 MG/5ML
1 ELIXIR ORAL
Qty: 0 | Refills: 0 | DISCHARGE
Start: 2022-02-09

## 2022-02-09 RX ORDER — INSULIN LISPRO 100/ML
8 VIAL (ML) SUBCUTANEOUS
Qty: 0 | Refills: 0 | DISCHARGE
Start: 2022-02-09

## 2022-02-09 RX ORDER — ACETAMINOPHEN 500 MG
2 TABLET ORAL
Qty: 0 | Refills: 0 | DISCHARGE
Start: 2022-02-09

## 2022-02-09 RX ORDER — POLYETHYLENE GLYCOL 3350 17 G/17G
17 POWDER, FOR SOLUTION ORAL EVERY 12 HOURS
Refills: 0 | Status: DISCONTINUED | OUTPATIENT
Start: 2022-02-09 | End: 2022-02-11

## 2022-02-09 RX ORDER — INSULIN LISPRO 100/ML
6 VIAL (ML) SUBCUTANEOUS
Refills: 0 | Status: DISCONTINUED | OUTPATIENT
Start: 2022-02-09 | End: 2022-02-09

## 2022-02-09 RX ORDER — SODIUM CHLORIDE 9 MG/ML
1000 INJECTION, SOLUTION INTRAVENOUS
Refills: 0 | Status: DISCONTINUED | OUTPATIENT
Start: 2022-02-09 | End: 2022-02-11

## 2022-02-09 RX ORDER — INSULIN GLARGINE 100 [IU]/ML
16 INJECTION, SOLUTION SUBCUTANEOUS AT BEDTIME
Refills: 0 | Status: DISCONTINUED | OUTPATIENT
Start: 2022-02-09 | End: 2022-02-11

## 2022-02-09 RX ORDER — SODIUM CHLORIDE 9 MG/ML
2 INJECTION INTRAMUSCULAR; INTRAVENOUS; SUBCUTANEOUS EVERY 6 HOURS
Refills: 0 | Status: DISCONTINUED | OUTPATIENT
Start: 2022-02-09 | End: 2022-02-11

## 2022-02-09 RX ORDER — DEXTROSE 50 % IN WATER 50 %
12.5 SYRINGE (ML) INTRAVENOUS ONCE
Refills: 0 | Status: DISCONTINUED | OUTPATIENT
Start: 2022-02-09 | End: 2022-02-11

## 2022-02-09 RX ORDER — INSULIN LISPRO 100/ML
5 VIAL (ML) SUBCUTANEOUS
Refills: 0 | Status: DISCONTINUED | OUTPATIENT
Start: 2022-02-09 | End: 2022-02-11

## 2022-02-09 RX ORDER — ATORVASTATIN CALCIUM 80 MG/1
20 TABLET, FILM COATED ORAL AT BEDTIME
Refills: 0 | Status: DISCONTINUED | OUTPATIENT
Start: 2022-02-09 | End: 2022-02-11

## 2022-02-09 RX ORDER — GLUCAGON INJECTION, SOLUTION 0.5 MG/.1ML
1 INJECTION, SOLUTION SUBCUTANEOUS ONCE
Refills: 0 | Status: DISCONTINUED | OUTPATIENT
Start: 2022-02-09 | End: 2022-02-11

## 2022-02-09 RX ORDER — LACOSAMIDE 50 MG/1
100 TABLET ORAL
Refills: 0 | Status: DISCONTINUED | OUTPATIENT
Start: 2022-02-09 | End: 2022-02-11

## 2022-02-09 RX ORDER — INSULIN LISPRO 100/ML
VIAL (ML) SUBCUTANEOUS
Refills: 0 | Status: DISCONTINUED | OUTPATIENT
Start: 2022-02-09 | End: 2022-02-11

## 2022-02-09 RX ORDER — DEXTROSE 50 % IN WATER 50 %
25 SYRINGE (ML) INTRAVENOUS ONCE
Refills: 0 | Status: DISCONTINUED | OUTPATIENT
Start: 2022-02-09 | End: 2022-02-11

## 2022-02-09 RX ORDER — POLYETHYLENE GLYCOL 3350 17 G/17G
17 POWDER, FOR SOLUTION ORAL
Qty: 0 | Refills: 0 | DISCHARGE
Start: 2022-02-09

## 2022-02-09 RX ORDER — SENNA PLUS 8.6 MG/1
2 TABLET ORAL AT BEDTIME
Refills: 0 | Status: DISCONTINUED | OUTPATIENT
Start: 2022-02-09 | End: 2022-02-11

## 2022-02-09 RX ORDER — LACOSAMIDE 50 MG/1
10 TABLET ORAL
Qty: 0 | Refills: 0 | DISCHARGE
Start: 2022-02-09

## 2022-02-09 RX ORDER — PETROLATUM,WHITE
1 JELLY (GRAM) TOPICAL
Qty: 0 | Refills: 0 | DISCHARGE
Start: 2022-02-09

## 2022-02-09 RX ORDER — DEXAMETHASONE 0.5 MG/5ML
2 ELIXIR ORAL
Refills: 0 | Status: DISCONTINUED | OUTPATIENT
Start: 2022-02-09 | End: 2022-02-11

## 2022-02-09 RX ORDER — SENNA PLUS 8.6 MG/1
2 TABLET ORAL
Qty: 0 | Refills: 0 | DISCHARGE
Start: 2022-02-09

## 2022-02-09 RX ORDER — ACETAMINOPHEN 500 MG
650 TABLET ORAL EVERY 6 HOURS
Refills: 0 | Status: DISCONTINUED | OUTPATIENT
Start: 2022-02-09 | End: 2022-02-11

## 2022-02-09 RX ORDER — ENOXAPARIN SODIUM 100 MG/ML
40 INJECTION SUBCUTANEOUS DAILY
Refills: 0 | Status: DISCONTINUED | OUTPATIENT
Start: 2022-02-09 | End: 2022-02-11

## 2022-02-09 RX ORDER — PANTOPRAZOLE SODIUM 20 MG/1
1 TABLET, DELAYED RELEASE ORAL
Qty: 0 | Refills: 0 | DISCHARGE
Start: 2022-02-09

## 2022-02-09 RX ORDER — INSULIN LISPRO 100/ML
VIAL (ML) SUBCUTANEOUS AT BEDTIME
Refills: 0 | Status: DISCONTINUED | OUTPATIENT
Start: 2022-02-09 | End: 2022-02-11

## 2022-02-09 RX ORDER — DEXTROSE 50 % IN WATER 50 %
15 SYRINGE (ML) INTRAVENOUS ONCE
Refills: 0 | Status: DISCONTINUED | OUTPATIENT
Start: 2022-02-09 | End: 2022-02-11

## 2022-02-09 RX ORDER — INSULIN LISPRO 100/ML
6 VIAL (ML) SUBCUTANEOUS
Qty: 0 | Refills: 0 | DISCHARGE
Start: 2022-02-09

## 2022-02-09 RX ORDER — SODIUM CHLORIDE 9 MG/ML
2 INJECTION INTRAMUSCULAR; INTRAVENOUS; SUBCUTANEOUS
Qty: 0 | Refills: 0 | DISCHARGE
Start: 2022-02-09

## 2022-02-09 RX ADMIN — ATORVASTATIN CALCIUM 20 MILLIGRAM(S): 80 TABLET, FILM COATED ORAL at 22:52

## 2022-02-09 RX ADMIN — SODIUM CHLORIDE 2 GRAM(S): 9 INJECTION INTRAMUSCULAR; INTRAVENOUS; SUBCUTANEOUS at 12:16

## 2022-02-09 RX ADMIN — ENOXAPARIN SODIUM 40 MILLIGRAM(S): 100 INJECTION SUBCUTANEOUS at 12:16

## 2022-02-09 RX ADMIN — SODIUM CHLORIDE 2 GRAM(S): 9 INJECTION INTRAMUSCULAR; INTRAVENOUS; SUBCUTANEOUS at 17:53

## 2022-02-09 RX ADMIN — LACOSAMIDE 100 MILLIGRAM(S): 50 TABLET ORAL at 17:54

## 2022-02-09 RX ADMIN — INSULIN GLARGINE 16 UNIT(S): 100 INJECTION, SOLUTION SUBCUTANEOUS at 22:52

## 2022-02-09 RX ADMIN — Medication 6 UNIT(S): at 16:58

## 2022-02-09 RX ADMIN — SODIUM CHLORIDE 2 GRAM(S): 9 INJECTION INTRAMUSCULAR; INTRAVENOUS; SUBCUTANEOUS at 06:44

## 2022-02-09 RX ADMIN — Medication 6 UNIT(S): at 08:24

## 2022-02-09 RX ADMIN — POLYETHYLENE GLYCOL 3350 17 GRAM(S): 17 POWDER, FOR SOLUTION ORAL at 06:44

## 2022-02-09 RX ADMIN — POLYETHYLENE GLYCOL 3350 17 GRAM(S): 17 POWDER, FOR SOLUTION ORAL at 17:54

## 2022-02-09 RX ADMIN — PANTOPRAZOLE SODIUM 40 MILLIGRAM(S): 20 TABLET, DELAYED RELEASE ORAL at 06:44

## 2022-02-09 RX ADMIN — Medication 6 UNIT(S): at 12:18

## 2022-02-09 RX ADMIN — SENNA PLUS 2 TABLET(S): 8.6 TABLET ORAL at 22:52

## 2022-02-09 RX ADMIN — SODIUM CHLORIDE 2 GRAM(S): 9 INJECTION INTRAMUSCULAR; INTRAVENOUS; SUBCUTANEOUS at 22:52

## 2022-02-09 RX ADMIN — Medication 2 MILLIGRAM(S): at 17:53

## 2022-02-09 RX ADMIN — Medication 2 MILLIGRAM(S): at 06:44

## 2022-02-09 RX ADMIN — Medication 1: at 09:01

## 2022-02-09 RX ADMIN — LACOSAMIDE 100 MILLIGRAM(S): 50 TABLET ORAL at 06:45

## 2022-02-09 NOTE — PATIENT PROFILE ADULT - FALL HARM RISK - HARM RISK INTERVENTIONS
Assistance with ambulation/Assistance OOB with selected safe patient handling equipment/Communicate Risk of Fall with Harm to all staff/Reinforce activity limits and safety measures with patient and family/Tailored Fall Risk Interventions/Use of alarms - bed, chair and/or voice tab/Visual Cue: Yellow wristband and red socks/Bed in lowest position, wheels locked, appropriate side rails in place/Call bell, personal items and telephone in reach/Instruct patient to call for assistance before getting out of bed or chair/Non-slip footwear when patient is out of bed/[place] to call system/Physically safe environment - no spills, clutter or unnecessary equipment/Purposeful Proactive Rounding/Room/bathroom lighting operational, light cord in reach

## 2022-02-09 NOTE — DISCHARGE NOTE PROVIDER - NSDCCPCAREPLAN_GEN_ALL_CORE_FT
PRINCIPAL DISCHARGE DIAGNOSIS  Diagnosis: Status post craniotomy  Assessment and Plan of Treatment: PT/OT/ST, staple removal 2/10, vimpat 100mg bid, decadron slow taper

## 2022-02-09 NOTE — SWALLOW BEDSIDE ASSESSMENT ADULT - SWALLOW EVAL: DIAGNOSIS
Pt presenting with mild oropharyngeal dysphagia s/p craniotomy. Adequate bite tear with solids observed, disorganized mastication with increased mastication time with regular solids, adequate oral clearance with no residue post swallow. Adequate oral grading to cup, suspect timely swallow initiation, multiple swallows per bolus indicative of likely pharyngeal stasis. With consecutive sips of thin liquids reflexive cough observed indicative of likely aspiration. No behavioral signs of aspiration observed when consuming liquids via small, single sips.

## 2022-02-09 NOTE — CHART NOTE - NSCHARTNOTEFT_GEN_A_CORE
66M hx HTN and DM2 was in the [place] for vacation in early January, when pt was noted to be confused and not answering appropriately, so an MRI was done on Jan 7 showed L frontal enhancing mass with significant edema.  He presented to [facility] on 1/24 for further evaluation and repeat CTH showed significant vasogenic edema and 1.5cm MLS. He underwent 1/27/22 for left sided craniotomy for tumor resection with gleolan.  Pt was started on decadron and Vimpat also.  Post op course was complicated by possible seizures with EEG negative. Hyponatremia with salt tabs added to regimen. 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 He was evaluated by PMR and was recommended for acute inpatient rehab. He was admitted to [facility] last night, 02/08/22, however COVID test came back positive.  COVID tests from 02/04 and 02/06/22 were negative.    COVID +  -Unclear if this is a false positive as pt has had two negative tests. He is asymptomatic w/o respiratory sx  -ordered a portable cxr to assess for lung involvement  -repeated covid nasal swab  -assess for trx pending results of above.    Left frontal tumor s/p craniotomy for resection with right sided drift, Gait Instability, ADL impairments and Functional impairments  - discharge back to rehab when able  - cont PT/OT/Speech while admitted  - Monitor incision -staples to be removed 2/10   -Continue Vimpat 100mg BID for seizure prophylaxis  -Continue Decadron slow taper- now on 2mg Q12h - to maintain until follow up with neurosurgery   -Will require follow up with neuro-oncology     Dysphagia  Hoarseness  -cont current diet of easy to chew, thin liquids. small and single sips.   -ENT consulted for hoarseness - d/w Randy    #Hyponatermia  - fluid restriction  -Continue salt tabs 2G Q6h    #HTN  -Continue lisinopril 5mg daily    #HLD   -Continue atorvastatin 20mg daily    #Type 2 DM  - A1c is 10.7 (1/25)  -FS and ISS  -Continue Lantus 15 units at bedtime  - continue premeal admelog 8U at breakfast and dinner and 6U at lunch  -will adjust insulin as needed 66M hx HTN and DM2 was in the [place] for vacation in early January, when pt was noted to be confused and not answering appropriately, so an MRI was done on Jan 7 showed L frontal enhancing mass with significant edema.  He presented to [facility] on 1/24 for further evaluation and repeat CTH showed significant vasogenic edema and 1.5cm MLS. He underwent 1/27/22 for left sided craniotomy for tumor resection with gleolan.  Pt was started on decadron and Vimpat also.  Post op course was complicated by possible seizures with EEG negative. Hyponatremia with salt tabs added to regimen. 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 He was evaluated by PMR and was recommended for acute inpatient rehab. He was admitted to [facility] last night, 02/08/22, however COVID test came back positive.  COVID tests from 02/04 and 02/06/22 were negative.    COVID +  -Unclear if this is a false positive as pt has had two negative tests. He is asymptomatic w/o respiratory sx  -ordered a portable cxr to assess for lung involvement  -repeated covid nasal swab  -assess for trx pending results of above.    Left frontal tumor s/p craniotomy for resection with right sided drift, Gait Instability, ADL impairments and Functional impairments  - discharge back to rehab when able  - cont PT/OT/Speech while admitted  - Monitor incision -staples to be removed 2/10   -Continue Vimpat 100mg BID for seizure prophylaxis  -Continue Decadron slow taper- now on 2mg Q12h - to maintain until follow up with neurosurgery   -Will require follow up with neuro-oncology     Dysphagia  Hoarseness  -cont current diet of easy to chew, thin liquids. small and single sips.   -ENT consulted for hoarseness - d/w Randy    #Hyponatermia  - fluid restriction  -Continue salt tabs 2G Q6h    #HTN  -BP low, lisinopril stopped.     #HLD   -Continue atorvastatin 20mg daily    #Type 2 DM  - A1c is 10.7 (1/25)  -FS and ISS  -Continue Lantus 15 units at bedtime  - BS borderline low. Reduce aspart to 5 units tid wm.   -will adjust insulin as needed

## 2022-02-09 NOTE — PHYSICAL THERAPY INITIAL EVALUATION ADULT - PERTINENT HX OF CURRENT PROBLEM, REHAB EVAL
presents from  inpatient rehab 2/2 COVID19+; patient s/p left frontal mass tumor resection/ craniotomy 1/27/22

## 2022-02-09 NOTE — SWALLOW BEDSIDE ASSESSMENT ADULT - SLP GENERAL OBSERVATIONS
Pt received in bed on room air. [place]  utilized for [place] translation. Pt alert and oriented to place, month, year; however, unaware of recent hx/situation. Hoarse vocal quality noted and pt able to intermittently follow simple directives with breakdown following and sequencing multistep directives. Utilized liquids at bedside to complete evaluation

## 2022-02-09 NOTE — SWALLOW BEDSIDE ASSESSMENT ADULT - ORAL PREPARATORY PHASE
Within functional limits Disorganized mastication with suspected missing dentition exacerbating oral phase deficits/Decreased mastication ability

## 2022-02-09 NOTE — H&P ADULT - HISTORY OF PRESENT ILLNESS
66M hx HTN/DM was [place] early January, was confused and not answering appropriately, MRI Jan 7 showed L frontal enhancing mass with significant edema. Repeat CTH today showed significant vasogenic edema and 1.5cm MLS. Exam: Awake, Ox1, answering many questions inappropriately/perseverating,  66M hx HTN/DM was [place] early January, was confused and not answering appropriately, MRI Jan 7 showed L frontal enhancing mass with significant edema.  He presented to [facility] on 1/24 for further evaluation and repeat CTH showed significant vasogenic edema and 1.5cm MLS. He underwent 1/27/22 for left sided craniotomy for tumor resection with gleolan.  Pt was started on decadron and Vimpat also.  Post op course was complicated by possible seizures with EEG negative. Hyponatermia with salt tabs added to regimen. 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 He was evaluated by PMR and was recommended for acute inpatient rehab. He was admitted to [facility] last night, 02/08/22, however COVID test came back positive.  Last COVID test from 02/04/22 was negative.   66M hx HTN and DM2 was in [place] [facility] [place] for vacation in early January, when pt was noted to be confused and not answering appropriately, so an MRI was done on Jan 7 showed L frontal enhancing mass with significant edema.  He presented to [facility] on 1/24 for further evaluation and repeat CTH showed significant vasogenic edema and 1.5cm MLS. He underwent 1/27/22 for left sided craniotomy for tumor resection with gleolan.  Pt was started on decadron and Vimpat also.  Post op course was complicated by possible seizures with EEG negative. Hyponatermia with salt tabs added to regimen. 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 He was evaluated by PMR and was recommended for acute inpatient rehab. He was admitted to [facility] last night, 02/08/22, however COVID test came back positive.  Last COVID test from 02/04/22 was negative.  Pt with no complaints   66M hx HTN and DM2 was in the [facility] [place] for vacation in early January, when pt was noted to be confused and not answering appropriately, so an MRI was done on Jan 7 showed L frontal enhancing mass with significant edema.  He presented to [facility] on 1/24 for further evaluation and repeat CTH showed significant vasogenic edema and 1.5cm MLS. He underwent 1/27/22 for left sided craniotomy for tumor resection with gleolan.  Pt was started on decadron and Vimpat also.  Post op course was complicated by possible seizures with EEG negative. Hyponatermia with salt tabs added to regimen. 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 He was evaluated by PMR and was recommended for acute inpatient rehab. He was admitted to [facility] last night, 02/08/22, however COVID test came back positive.  COVID tests from 02/04 and 02/06/22 were negative.  Pt with no complaints-denies cough, dyspnea, chest pain, headache, fever, chills, abd pain, nausea, vomiting, diarrhea.    no

## 2022-02-09 NOTE — H&P ADULT - ASSESSMENT
66M hx HTN and DM2 was in the [facility] [place] for vacation in early January, when pt was noted to be confused and not answering appropriately, so an MRI was done on Jan 7 showed L frontal enhancing mass with significant edema.  He presented to [facility] on 1/24 for further evaluation and repeat CTH showed significant vasogenic edema and 1.5cm MLS. He underwent 1/27/22 for left sided craniotomy for tumor resection with gleolan.  Pt was started on decadron and Vimpat also.  Post op course was complicated by possible seizures with EEG negative. Hyponatremia with salt tabs added to regimen. 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 He was evaluated by PMR and was recommended for acute inpatient rehab. He was admitted to [facility] last night, 02/08/22, however COVID test came back positive.  COVID tests from 02/04 and 02/06/22 were negative.    COVID +ve   S/p craniotomy for grade 4 Gliosarcoma  Hyponatremia, improved  HTN, DM    Admit to medical floor  Cont current meds:        66M hx HTN and DM2 was in the [facility] [place] for vacation in early January, when pt was noted to be confused and not answering appropriately, so an MRI was done on Jan 7 showed L frontal enhancing mass with significant edema.  He presented to [facility] on 1/24 for further evaluation and repeat CTH showed significant vasogenic edema and 1.5cm MLS. He underwent 1/27/22 for left sided craniotomy for tumor resection with gleolan.  Pt was started on decadron and Vimpat also.  Post op course was complicated by possible seizures with EEG negative. Hyponatremia with salt tabs added to regimen. 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 He was evaluated by PMR and was recommended for acute inpatient rehab. He was admitted to [facility] last night, 02/08/22, however COVID test came back positive.  COVID tests from 02/04 and 02/06/22 were negative.    COVID +ve   S/p craniotomy for grade 4 Gliosarcoma  Hyponatremia, improved  HTN, DM    Admit to medical floor  Cont current meds: Lacosamide, decadron, Statin, PPI  Cont to monitor FS, cont lantus insulin, admelog  Supportive care for COVID  ?Monoclonal ab  Cont DVT prophylaxis

## 2022-02-09 NOTE — PHYSICAL THERAPY INITIAL EVALUATION ADULT - ADDITIONAL COMMENTS
lives in room in elevated apartment with 1 DAVID; independent with all mobility, ADLs and IADLs without AD PTA

## 2022-02-09 NOTE — DISCHARGE NOTE PROVIDER - CARE PROVIDER_API CALL
Parker Gupta)  Neurosurgery  [address] [place] [address]  Phone: [phone]  Fax: [phone]  Follow Up Time:

## 2022-02-09 NOTE — SWALLOW BEDSIDE ASSESSMENT ADULT - ADDITIONAL RECOMMENDATIONS
Patient observed with hoarse vocal quality during conversation which he reports is not his baseline. Recommend ENT consult to assess integrity of vocal cords/ etiology of vocal quality.

## 2022-02-09 NOTE — DISCHARGE NOTE PROVIDER - HOSPITAL COURSE
66 year old male with PMH of HTN, DM type 2, HLD, known brain mass. [place] was [place] early January, was confused and not answering appropriately, MRI Jan 7 showed Left frontal enhancing mass with significant edema. He presented to [facility] on 1/24 for further evaluation and repeat CTH today showed significant vasogenic edema and 1.5cm MLS. He underwent 1/27/22 for left sided craniotomy for tumor resection with gleolan. Post op course was complicated by possible seizures with EEG negative. Hyponatermia with salt tabs added to regimen. Pathology for tumor returned as gliosarcoma and will require outpatient follow up with heme/onc. He was evaluated by PMR and was recommended for acute inpatient rehab. He was admitted to [facility] on 2/8/22.     On initial COVID-19 swab at [facility], pt was found to be positive, despite having no symptoms. Policy is for patient to be transferred to a medicine floor due to the positive COVID-19 test.

## 2022-02-09 NOTE — SWALLOW BEDSIDE ASSESSMENT ADULT - COMMENTS
WBC: 9.96  CXR: 2/1 Clear lungs  MRI 1/29: IMPRESSION: 3.7 x 3.1 cm region of heterogeneous T1 shortening, consistent with hemorrhagic products in the left frontal surgical bed. The presence of T1 shortening limits evaluation for the presence of residual enhancing neoplasm. Similar extensive T2 and FLAIR hyperintense signal surrounding the surgical cavity, and extending into the left subinsular region, across the anterior corpus callosum, and involving the septum pellucidum. This may represent nonenhancing neoplasm and/or edema. Similar abnormal T2 and FLAIR hyperintense signal along involving white matter along the right occipital horn and ventricular atrium. While this may represent chronic ischemic changes, the presence of nonenhancing neoplasm should be considered.  Head CT 1/29: IMPRESSION:  -Interval development of acute hemorrhage within the left frontal resection cavity measuring up to 2 cm. -Interval slight decrease in rightward midline shift, now 1 cm. -Other postsurgical changes as described above. Stable hypoattenuation surrounding the resection cavity.

## 2022-02-09 NOTE — DISCHARGE NOTE PROVIDER - NSDCMRMEDTOKEN_GEN_ALL_CORE_FT
acetaminophen 325 mg oral tablet: 2 tab(s) orally every 6 hours, As needed, Temp greater or equal to 38C (100.4F), Mild Pain (1 - 3), Moderate Pain (4 - 6)  Admelog 100 units/mL injectable solution: 8 unit(s) injectable once a day (in the morning)  Admelog 100 units/mL injectable solution: 6 unit(s) injectable once a day before lunch (in the afternoon)  Admelog 100 units/mL injectable solution: 8  injectable once a day before dinner (in the evening)  atorvastatin 20 mg oral tablet: 1 tab(s) orally once a day (at bedtime)  dexamethasone 2 mg oral tablet: 1 tab(s) orally 2 times a day  enoxaparin: 40 milligram(s) subcutaneous once a day (at bedtime)  insulin glargine: 15 unit(s) subcutaneous once a day (at bedtime)  lacosamide 10 mg/mL oral solution: 10 milliliter(s) orally 2 times a day  pantoprazole 40 mg oral delayed release tablet: 1 tab(s) orally once a day (before a meal)  petrolatum topical ointment: 1 application topically once a day  polyethylene glycol 3350 oral powder for reconstitution: 17 gram(s) orally every 12 hours  senna oral tablet: 2 tab(s) orally once a day (at bedtime)  sodium chloride 1 g oral tablet: 2 tab(s) orally every 6 hours

## 2022-02-09 NOTE — H&P ADULT - NSHPPHYSICALEXAM_GEN_ALL_CORE
Vital Signs (24 Hrs):  T(C): 36.6 (02-09-22 @ 02:25), Max: 36.8 (02-08-22 @ 08:42)  HR: 72 (02-09-22 @ 02:25) (60 - 78)  BP: 98/61 (02-09-22 @ 02:25) (90/59 - 111/59)  RR: 16 (02-09-22 @ 02:25) (15 - 18)  SpO2: 95% (02-09-22 @ 02:25) (95% - 97%)

## 2022-02-09 NOTE — SWALLOW BEDSIDE ASSESSMENT ADULT - ASR SWALLOW LINGUAL MOBILITY
Reduced ROM/weakness/impaired protrusion/impaired left lateral movement/impaired right lateral movement

## 2022-02-09 NOTE — SWALLOW BEDSIDE ASSESSMENT ADULT - SLP PERTINENT HISTORY OF CURRENT PROBLEM
66M hx HTN and DM2 was in the [facility] [place] for vacation in early January, when pt was noted to be confused and not answering appropriately, so an MRI was done on Jan 7 showed L frontal enhancing mass with significant edema.  He presented to [facility] on 1/24 for further evaluation and repeat CTH showed significant vasogenic edema and 1.5cm MLS. He underwent 1/27/22 for left sided craniotomy for tumor resection with gleolan.  Pt was started on decadron and Vimpat also.  Post op course was complicated by possible seizures with EEG negative. Hyponatermia with salt tabs added to regimen. 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 He was evaluated by PMR and was recommended for acute inpatient rehab. He was admitted to [facility] last night, 02/08/22, however COVID test came back positive.  COVID tests from 02/04 and 02/06/22 were negative.

## 2022-02-09 NOTE — SWALLOW BEDSIDE ASSESSMENT ADULT - PHARYNGEAL PHASE
Cough post oral intake/Multiple swallows Multiple swallows/Within functional limits Within functional limits

## 2022-02-09 NOTE — OCCUPATIONAL THERAPY INITIAL EVALUATION ADULT - GENERAL OBSERVATIONS, REHAB EVAL
received supine with head of bed elevated, +condom cath, all needs in reach, in NAD, agreeable to OT

## 2022-02-09 NOTE — DISCHARGE NOTE PROVIDER - NSDCACTIVITY_GEN_ALL_CORE
Do not drive or operate machinery/Do not make important decisions/Follow Instructions Provided by your Surgical Team

## 2022-02-09 NOTE — OCCUPATIONAL THERAPY INITIAL EVALUATION ADULT - PERTINENT HX OF CURRENT PROBLEM, REHAB EVAL
s/p L craniotomy for resection of brain tumor with Gleolan 1/27 remained intubated due to slow wakeup. after OR b/l LE shaking concerning for seizure, CTH stable. vEEG neg. s/p extub 1/30 at 11:10

## 2022-02-10 ENCOUNTER — TRANSCRIPTION ENCOUNTER (OUTPATIENT)
Age: 67
End: 2022-02-10

## 2022-02-10 DIAGNOSIS — R49.0 DYSPHONIA: ICD-10-CM

## 2022-02-10 LAB
A1C WITH ESTIMATED AVERAGE GLUCOSE RESULT: 8.7 % — HIGH (ref 4–5.6)
ANION GAP SERPL CALC-SCNC: 6 MMOL/L — SIGNIFICANT CHANGE UP (ref 5–17)
BASOPHILS # BLD AUTO: 0.01 K/UL — SIGNIFICANT CHANGE UP (ref 0–0.2)
BASOPHILS NFR BLD AUTO: 0.1 % — SIGNIFICANT CHANGE UP (ref 0–2)
BUN SERPL-MCNC: 19 MG/DL — SIGNIFICANT CHANGE UP (ref 7–23)
CALCIUM SERPL-MCNC: 8.4 MG/DL — SIGNIFICANT CHANGE UP (ref 8.4–10.5)
CHLORIDE SERPL-SCNC: 100 MMOL/L — SIGNIFICANT CHANGE UP (ref 96–108)
CO2 SERPL-SCNC: 29 MMOL/L — SIGNIFICANT CHANGE UP (ref 22–31)
CREAT SERPL-MCNC: 0.92 MG/DL — SIGNIFICANT CHANGE UP (ref 0.5–1.3)
EOSINOPHIL # BLD AUTO: 0.01 K/UL — SIGNIFICANT CHANGE UP (ref 0–0.5)
EOSINOPHIL NFR BLD AUTO: 0.1 % — SIGNIFICANT CHANGE UP (ref 0–6)
ESTIMATED AVERAGE GLUCOSE: 203 MG/DL — HIGH (ref 68–114)
GLUCOSE BLDC GLUCOMTR-MCNC: 107 MG/DL — HIGH (ref 70–99)
GLUCOSE BLDC GLUCOMTR-MCNC: 128 MG/DL — HIGH (ref 70–99)
GLUCOSE BLDC GLUCOMTR-MCNC: 164 MG/DL — HIGH (ref 70–99)
GLUCOSE BLDC GLUCOMTR-MCNC: 185 MG/DL — HIGH (ref 70–99)
GLUCOSE SERPL-MCNC: 175 MG/DL — HIGH (ref 70–99)
HCT VFR BLD CALC: 35.3 % — LOW (ref 39–50)
HCV AB S/CO SERPL IA: 0.1 S/CO — SIGNIFICANT CHANGE UP (ref 0–0.99)
HCV AB SERPL-IMP: SIGNIFICANT CHANGE UP
HGB BLD-MCNC: 11.6 G/DL — LOW (ref 13–17)
IMM GRANULOCYTES NFR BLD AUTO: 0.5 % — SIGNIFICANT CHANGE UP (ref 0–1.5)
LYMPHOCYTES # BLD AUTO: 1.13 K/UL — SIGNIFICANT CHANGE UP (ref 1–3.3)
LYMPHOCYTES # BLD AUTO: 13.2 % — SIGNIFICANT CHANGE UP (ref 13–44)
MCHC RBC-ENTMCNC: 30.9 PG — SIGNIFICANT CHANGE UP (ref 27–34)
MCHC RBC-ENTMCNC: 32.9 GM/DL — SIGNIFICANT CHANGE UP (ref 32–36)
MCV RBC AUTO: 94.1 FL — SIGNIFICANT CHANGE UP (ref 80–100)
MONOCYTES # BLD AUTO: 0.57 K/UL — SIGNIFICANT CHANGE UP (ref 0–0.9)
MONOCYTES NFR BLD AUTO: 6.6 % — SIGNIFICANT CHANGE UP (ref 2–14)
NEUTROPHILS # BLD AUTO: 6.82 K/UL — SIGNIFICANT CHANGE UP (ref 1.8–7.4)
NEUTROPHILS NFR BLD AUTO: 79.5 % — HIGH (ref 43–77)
NRBC # BLD: 0 /100 WBCS — SIGNIFICANT CHANGE UP (ref 0–0)
PLATELET # BLD AUTO: 163 K/UL — SIGNIFICANT CHANGE UP (ref 150–400)
POTASSIUM SERPL-MCNC: 3.9 MMOL/L — SIGNIFICANT CHANGE UP (ref 3.5–5.3)
POTASSIUM SERPL-SCNC: 3.9 MMOL/L — SIGNIFICANT CHANGE UP (ref 3.5–5.3)
RBC # BLD: 3.75 M/UL — LOW (ref 4.2–5.8)
RBC # FLD: 13.5 % — SIGNIFICANT CHANGE UP (ref 10.3–14.5)
SARS-COV-2 RNA SPEC QL NAA+PROBE: SIGNIFICANT CHANGE UP
SODIUM SERPL-SCNC: 135 MMOL/L — SIGNIFICANT CHANGE UP (ref 135–145)
WBC # BLD: 8.58 K/UL — SIGNIFICANT CHANGE UP (ref 3.8–10.5)
WBC # FLD AUTO: 8.58 K/UL — SIGNIFICANT CHANGE UP (ref 3.8–10.5)

## 2022-02-10 PROCEDURE — 31575 DIAGNOSTIC LARYNGOSCOPY: CPT

## 2022-02-10 PROCEDURE — 99232 SBSQ HOSP IP/OBS MODERATE 35: CPT

## 2022-02-10 PROCEDURE — 99222 1ST HOSP IP/OBS MODERATE 55: CPT | Mod: 25

## 2022-02-10 RX ORDER — DEXAMETHASONE 0.5 MG/5ML
1 ELIXIR ORAL
Qty: 0 | Refills: 0 | DISCHARGE
Start: 2022-02-10

## 2022-02-10 RX ORDER — ACETAMINOPHEN 500 MG
2 TABLET ORAL
Qty: 0 | Refills: 0 | DISCHARGE
Start: 2022-02-10

## 2022-02-10 RX ORDER — SENNA PLUS 8.6 MG/1
2 TABLET ORAL
Qty: 0 | Refills: 0 | DISCHARGE
Start: 2022-02-10

## 2022-02-10 RX ORDER — PANTOPRAZOLE SODIUM 20 MG/1
1 TABLET, DELAYED RELEASE ORAL
Qty: 0 | Refills: 0 | DISCHARGE
Start: 2022-02-10

## 2022-02-10 RX ORDER — POLYETHYLENE GLYCOL 3350 17 G/17G
17 POWDER, FOR SOLUTION ORAL
Qty: 0 | Refills: 0 | DISCHARGE
Start: 2022-02-10

## 2022-02-10 RX ORDER — ATORVASTATIN CALCIUM 80 MG/1
1 TABLET, FILM COATED ORAL
Qty: 0 | Refills: 0 | DISCHARGE
Start: 2022-02-10

## 2022-02-10 RX ORDER — LACOSAMIDE 50 MG/1
10 TABLET ORAL
Qty: 0 | Refills: 0 | DISCHARGE
Start: 2022-02-10

## 2022-02-10 RX ORDER — SODIUM CHLORIDE 9 MG/ML
2 INJECTION INTRAMUSCULAR; INTRAVENOUS; SUBCUTANEOUS
Qty: 0 | Refills: 0 | DISCHARGE
Start: 2022-02-10

## 2022-02-10 RX ADMIN — SODIUM CHLORIDE 2 GRAM(S): 9 INJECTION INTRAMUSCULAR; INTRAVENOUS; SUBCUTANEOUS at 11:44

## 2022-02-10 RX ADMIN — INSULIN GLARGINE 16 UNIT(S): 100 INJECTION, SOLUTION SUBCUTANEOUS at 22:26

## 2022-02-10 RX ADMIN — Medication 1: at 11:44

## 2022-02-10 RX ADMIN — Medication 5 UNIT(S): at 11:44

## 2022-02-10 RX ADMIN — Medication 2 MILLIGRAM(S): at 05:44

## 2022-02-10 RX ADMIN — POLYETHYLENE GLYCOL 3350 17 GRAM(S): 17 POWDER, FOR SOLUTION ORAL at 05:44

## 2022-02-10 RX ADMIN — Medication 5 UNIT(S): at 08:04

## 2022-02-10 RX ADMIN — Medication 2 MILLIGRAM(S): at 18:31

## 2022-02-10 RX ADMIN — LACOSAMIDE 100 MILLIGRAM(S): 50 TABLET ORAL at 05:59

## 2022-02-10 RX ADMIN — Medication 1: at 08:05

## 2022-02-10 RX ADMIN — ENOXAPARIN SODIUM 40 MILLIGRAM(S): 100 INJECTION SUBCUTANEOUS at 11:44

## 2022-02-10 RX ADMIN — SODIUM CHLORIDE 2 GRAM(S): 9 INJECTION INTRAMUSCULAR; INTRAVENOUS; SUBCUTANEOUS at 05:44

## 2022-02-10 RX ADMIN — SODIUM CHLORIDE 2 GRAM(S): 9 INJECTION INTRAMUSCULAR; INTRAVENOUS; SUBCUTANEOUS at 18:31

## 2022-02-10 RX ADMIN — PANTOPRAZOLE SODIUM 40 MILLIGRAM(S): 20 TABLET, DELAYED RELEASE ORAL at 05:45

## 2022-02-10 RX ADMIN — SENNA PLUS 2 TABLET(S): 8.6 TABLET ORAL at 22:26

## 2022-02-10 RX ADMIN — LACOSAMIDE 100 MILLIGRAM(S): 50 TABLET ORAL at 18:31

## 2022-02-10 RX ADMIN — ATORVASTATIN CALCIUM 20 MILLIGRAM(S): 80 TABLET, FILM COATED ORAL at 22:26

## 2022-02-10 RX ADMIN — Medication 5 UNIT(S): at 18:31

## 2022-02-10 NOTE — PROGRESS NOTE ADULT - SUBJECTIVE AND OBJECTIVE BOX
Patient is a 66y old  Male who presents with a chief complaint of COVID +ve (09 Feb 2022 02:00)    No events overnight  COVID negative from 2/9/2022  Patient seen and examined at bedside.    ALLERGIES:  No Known Allergies    MEDICATIONS  (STANDING):  atorvastatin 20 milliGRAM(s) Oral at bedtime  dexAMETHasone     Tablet 2 milliGRAM(s) Oral two times a day  dextrose 40% Gel 15 Gram(s) Oral once  dextrose 5%. 1000 milliLiter(s) (50 mL/Hr) IV Continuous <Continuous>  dextrose 5%. 1000 milliLiter(s) (100 mL/Hr) IV Continuous <Continuous>  dextrose 50% Injectable 25 Gram(s) IV Push once  dextrose 50% Injectable 12.5 Gram(s) IV Push once  dextrose 50% Injectable 25 Gram(s) IV Push once  enoxaparin Injectable 40 milliGRAM(s) SubCutaneous daily  glucagon  Injectable 1 milliGRAM(s) IntraMuscular once  insulin glargine Injectable (LANTUS) 16 Unit(s) SubCutaneous at bedtime  insulin lispro (ADMELOG) corrective regimen sliding scale   SubCutaneous three times a day before meals  insulin lispro (ADMELOG) corrective regimen sliding scale   SubCutaneous at bedtime  insulin lispro Injectable (ADMELOG) 5 Unit(s) SubCutaneous before breakfast  insulin lispro Injectable (ADMELOG) 5 Unit(s) SubCutaneous before lunch  insulin lispro Injectable (ADMELOG) 5 Unit(s) SubCutaneous before dinner  lacosamide Solution 100 milliGRAM(s) Oral two times a day  pantoprazole    Tablet 40 milliGRAM(s) Oral before breakfast  polyethylene glycol 3350 17 Gram(s) Oral every 12 hours  senna 2 Tablet(s) Oral at bedtime  sodium chloride 2 Gram(s) Oral every 6 hours    MEDICATIONS  (PRN):  acetaminophen     Tablet .. 650 milliGRAM(s) Oral every 6 hours PRN Temp greater or equal to 38C (100.4F), Mild Pain (1 - 3)    Vital Signs Last 24 Hrs  T(F): 97.5 (10 Feb 2022 05:43), Max: 98.3 (09 Feb 2022 20:58)  HR: 57 (10 Feb 2022 05:43) (57 - 74)  BP: 110/60 (10 Feb 2022 05:43) (100/67 - 110/60)  RR: 16 (10 Feb 2022 05:43) (16 - 18)  SpO2: 97% (10 Feb 2022 05:43) (97% - 100%)  I&O's Summary    09 Feb 2022 07:01  -  10 Feb 2022 07:00  --------------------------------------------------------  IN: 800 mL / OUT: 0 mL / NET: 800 mL      BMI (kg/m2): 23.3 (02-08-22 @ 18:48)  PHYSICAL EXAM:  General: NAD, A/O x 3, craniotomy site noted C/D/I  ENT: MMM, no scleral icterus  Neck: Supple, No JVD, no thyroidomegaly  Lungs: Clear to auscultation bilaterally, no wheezes, no rales, no rhonchi, good inspiratory effort  Cardio: RRR, S1/S2, No murmurs  Abdomen: Soft, Nontender, Nondistended; Bowel sounds present  Extremities: No calf tenderness, No pitting edema, no skin changes    LABS:                        11.6   8.58  )-----------( 163      ( 10 Feb 2022 05:45 )             35.3       02-10    135  |  100  |  19  ----------------------------<  175  3.9   |  29  |  0.92    Ca    8.4      10 Feb 2022 05:45    TPro  6.0  /  Alb  2.5  /  TBili  0.9  /  DBili  x   /  AST  13  /  ALT  32  /  AlkPhos  74  02-08     eGFR if Non African American: 87 mL/min/1.73M2 (02-10-22 @ 05:45)  eGFR if African American: 101 mL/min/1.73M2 (02-10-22 @ 05:45)    POCT Blood Glucose.: 185 mg/dL (10 Feb 2022 11:43)  POCT Blood Glucose.: 164 mg/dL (10 Feb 2022 08:03)  POCT Blood Glucose.: 206 mg/dL (09 Feb 2022 22:51)  POCT Blood Glucose.: 130 mg/dL (09 Feb 2022 16:57)    COVID-19 PCR: NotDetec (02-09-22 @ 10:20)  COVID-19 PCR: Detected (02-08-22 @ 22:50)  COVID-19 PCR: NotDetec (02-06-22 @ 13:16)  COVID-19 PCR: NotDetec (02-04-22 @ 12:20)  COVID-19 PCR: NotDetec (01-26-22 @ 22:36)  COVID-19 PCR: NotDetec (02-09-22 @ 10:20)  COVID-19 PCR: Detected (02-08-22 @ 22:50)  COVID-19 PCR: NotDetec (02-06-22 @ 13:16)  COVID-19 PCR: NotDetec (02-04-22 @ 12:20)  COVID-19 PCR: NotDetec (01-26-22 @ 22:36)  COVID-19 PCR: NotDetec (01-24-22 @ 19:40)        RADIOLOGY & ADDITIONAL TESTS:  Care Discussed with Consultants/Other Providers:

## 2022-02-10 NOTE — DISCHARGE NOTE PROVIDER - NSDCMRMEDTOKEN_GEN_ALL_CORE_FT
acetaminophen 325 mg oral tablet: 2 tab(s) orally every 6 hours, As needed, Temp greater or equal to 38C (100.4F), Mild Pain (1 - 3)  Admelog 100 units/mL injectable solution: 8 unit(s) injectable once a day (in the morning)  Admelog 100 units/mL injectable solution: 6 unit(s) injectable once a day before lunch (in the afternoon)  Admelog 100 units/mL injectable solution: 8  injectable once a day before dinner (in the evening)  atorvastatin 20 mg oral tablet: 1 tab(s) orally once a day (at bedtime)  dexamethasone 2 mg oral tablet: 1 tab(s) orally 2 times a day  enoxaparin: 40 milligram(s) subcutaneous once a day (at bedtime)  insulin glargine: 15 unit(s) subcutaneous once a day (at bedtime)  lacosamide 10 mg/mL oral solution: 10 milliliter(s) orally 2 times a day  pantoprazole 40 mg oral delayed release tablet: 1 tab(s) orally once a day (before a meal)  polyethylene glycol 3350 oral powder for reconstitution: 17 gram(s) orally every 12 hours  senna oral tablet: 2 tab(s) orally once a day (at bedtime)  sodium chloride 1 g oral tablet: 2 tab(s) orally every 6 hours   acetaminophen 325 mg oral tablet: 2 tab(s) orally every 6 hours, As needed, Mild Pain (1 - 3), Moderate Pain (4 - 6)  atorvastatin 20 mg oral tablet: 1 tab(s) orally once a day (at bedtime)  escitalopram 5 mg oral tablet: 1 tab(s) orally once a day  fluticasone 50 mcg/inh nasal spray: 1 spray(s) nasal 2 times a day  gabapentin 100 mg oral capsule: 1 cap(s) orally once a day (at bedtime)  HumaLOG KwikPen 100 units/mL injectable solution: 5 unit(s) subcutaneous 3 times a day (with meals)   Insulin Pen Needles, 4mm: 1 application subcutaneously 4 times a day. ** Use with insulin pen **   Lantus Solostar Pen 100 units/mL subcutaneous solution: 16 unit(s) subcutaneous once a day (at bedtime)   melatonin 3 mg oral tablet: 1 tab(s) orally once a day (at bedtime)  metFORMIN 1000 mg oral tablet: 1 tab(s) orally 2 times a day  pantoprazole 40 mg oral delayed release tablet: 1 tab(s) orally once a day (before a meal)  polyethylene glycol 3350 oral powder for reconstitution: 17 gram(s) orally every 12 hours, As Needed for constipation (if no BM x 2 days).   senna oral tablet: 2 tab(s) orally once a day (at bedtime)  sodium chloride 1 g oral tablet: 1 tab(s) orally 2 times a day   Vimpat 100 mg oral tablet: 1 tab(s) orally 2 times a day

## 2022-02-10 NOTE — DISCHARGE NOTE PROVIDER - HOSPITAL COURSE
Hospital Course  HPI:  66M hx HTN and DM2 was in the [place] for vacation in early January, when pt was noted to be confused and not answering appropriately, so an MRI was done on Jan 7 showed L frontal enhancing mass with significant edema.  He presented to [facility] on 1/24 for further evaluation and repeat CTH showed significant vasogenic edema and 1.5cm MLS. He underwent 1/27/22 for left sided craniotomy for tumor resection with gleolan.  Pt was started on decadron and Vimpat also.  Post op course was complicated by possible seizures with EEG negative. Hyponatermia with salt tabs added to regimen. 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 He was evaluated by PMR and was recommended for acute inpatient rehab. He was admitted to [facility] last night, 02/08/22, however COVID test came back positive.  COVID tests from 02/04 and 02/06/22 were negative.  Pt with no complaints-denies cough, dyspnea, chest pain, headache, fever, chills, abd pain, nausea, vomiting, diarrhea.     COVID swab from 2/9/2022 and 2/10/2022 are both negative.  Patient does not need isolation at this time.  I am going to say that the COVID swab from 2/8/2022 was a false positive.  Patient stable for discharge back to acute rehab.       Hospital Course  HPI:  66M hx HTN and DM2 was in the [place] for vacation in early January, when pt was noted to be confused and not answering appropriately, so an MRI was done on Jan 7 showed L frontal enhancing mass with significant edema.  He presented to [facility] on 1/24 for further evaluation and repeat CTH showed significant vasogenic edema and 1.5cm MLS. He underwent 1/27/22 for left sided craniotomy for tumor resection with gleolan.  Pt was started on decadron and Vimpat also.  Post op course was complicated by possible seizures with EEG negative. Hyponatermia with salt tabs added to regimen. 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 He was evaluated by PMR and was recommended for acute inpatient rehab. He was admitted to [facility] last night, 02/08/22, however COVID test came back positive.  COVID tests from 02/04 and 02/06/22 were negative.  Pt with no complaints-denies cough, dyspnea, chest pain, headache, fever, chills, abd pain, nausea, vomiting, diarrhea.     COVID swab from 2/9/2022 and 2/10/2022 are both negative.  Patient does not need isolation at this time.  I am going to say that the COVID swab from 2/8/2022 was a false positive.  Patient stable for discharge back to acute rehab.    While here, we consulted ENT for hoarseness:  Recommendations from ENT as below:  - vocal cords mobile but with incomplete closure  - Continue with speech and swallow therapy.  - Follow up with ENT/Laryngology, Dr. Graham Bergeron [phone] 2 weeks after discharge.

## 2022-02-10 NOTE — CONSULT NOTE ADULT - REASON FOR ADMISSION
COVID +ve Additional Notes: Patient consent was obtained to proceed with the visit and recommended plan of care after discussion of all risks and benefits, including the risks of COVID-19 exposure. Detail Level: Simple

## 2022-02-10 NOTE — DISCHARGE NOTE PROVIDER - NSDCFUSCHEDAPPT_GEN_ALL_CORE_FT
JORDAN CHAKRABORTY ; 03/26/2022 ; NPP Endocrin [address]  JORDAN CHAKRABORTY ; 03/26/2022 ; NPP Cardio [address] ; 03/26/2022 ; NPP Cardio [address]  JORDAN CHAKRABORTY ; 03/31/2022 ; NPP Rad Med [address] [facility]  JORDAN CHAKRABORTY ; 04/01/2022 ; NPP Rad  [address]  JORDAN CHAKRABORTY ; 04/06/2022 ; NPP Neurosurg [address] [facility]  JORDAN CHAKRABORTY ; 04/06/2022 ; NPP Neurology [address] [facility]  JORDAN CHAKRABORTY ; 05/10/2022 ; NPP Cardio [address]

## 2022-02-10 NOTE — CONSULT NOTE ADULT - PROBLEM SELECTOR RECOMMENDATION 9
- vocal cords mobile but with incomplete closure  - Continue with speech and swallow therapy.  - Follow up with ENT/Laryngology, Dr. Graham Bergeron [phone] 2 weeks after discharge.

## 2022-02-10 NOTE — CHART NOTE - NSCHARTNOTEFT_GEN_A_CORE
Repeat COVID swab from both 2/9 and 2/10 are negative  I do not suspect COVID pneumonia at this time  I will d/c isolation  Will recommend discharge back to Acute rehab  Waiting on PMR evaluation

## 2022-02-11 ENCOUNTER — INPATIENT (INPATIENT)
Facility: HOSPITAL | Age: 67
LOS: 12 days | Discharge: HOME HEALTH | DRG: 949 | End: 2022-02-24
Attending: PSYCHIATRY & NEUROLOGY | Admitting: PSYCHIATRY & NEUROLOGY
Payer: COMMERCIAL

## 2022-02-11 VITALS
DIASTOLIC BLOOD PRESSURE: 66 MMHG | OXYGEN SATURATION: 98 % | SYSTOLIC BLOOD PRESSURE: 101 MMHG | RESPIRATION RATE: 16 BRPM | TEMPERATURE: 98 F | HEART RATE: 68 BPM

## 2022-02-11 VITALS
HEIGHT: 67 IN | SYSTOLIC BLOOD PRESSURE: 92 MMHG | RESPIRATION RATE: 14 BRPM | DIASTOLIC BLOOD PRESSURE: 56 MMHG | WEIGHT: 164.69 LBS | OXYGEN SATURATION: 98 % | HEART RATE: 62 BPM | TEMPERATURE: 98 F

## 2022-02-11 DIAGNOSIS — Z98.890 OTHER SPECIFIED POSTPROCEDURAL STATES: Chronic | ICD-10-CM

## 2022-02-11 DIAGNOSIS — D43.2 NEOPLASM OF UNCERTAIN BEHAVIOR OF BRAIN, UNSPECIFIED: ICD-10-CM

## 2022-02-11 LAB
GLUCOSE BLDC GLUCOMTR-MCNC: 104 MG/DL — HIGH (ref 70–99)
GLUCOSE BLDC GLUCOMTR-MCNC: 125 MG/DL — HIGH (ref 70–99)
GLUCOSE BLDC GLUCOMTR-MCNC: 211 MG/DL — HIGH (ref 70–99)
GLUCOSE BLDC GLUCOMTR-MCNC: 224 MG/DL — HIGH (ref 70–99)
SARS-COV-2 RNA SPEC QL NAA+PROBE: SIGNIFICANT CHANGE UP

## 2022-02-11 PROCEDURE — 97162 PT EVAL MOD COMPLEX 30 MIN: CPT

## 2022-02-11 PROCEDURE — 97116 GAIT TRAINING THERAPY: CPT

## 2022-02-11 PROCEDURE — 99239 HOSP IP/OBS DSCHRG MGMT >30: CPT

## 2022-02-11 PROCEDURE — 92610 EVALUATE SWALLOWING FUNCTION: CPT

## 2022-02-11 PROCEDURE — 80048 BASIC METABOLIC PNL TOTAL CA: CPT

## 2022-02-11 PROCEDURE — 82962 GLUCOSE BLOOD TEST: CPT

## 2022-02-11 PROCEDURE — 99223 1ST HOSP IP/OBS HIGH 75: CPT

## 2022-02-11 PROCEDURE — 71045 X-RAY EXAM CHEST 1 VIEW: CPT

## 2022-02-11 PROCEDURE — 85025 COMPLETE CBC W/AUTO DIFF WBC: CPT

## 2022-02-11 PROCEDURE — 97166 OT EVAL MOD COMPLEX 45 MIN: CPT

## 2022-02-11 PROCEDURE — 97110 THERAPEUTIC EXERCISES: CPT

## 2022-02-11 PROCEDURE — U0005: CPT

## 2022-02-11 PROCEDURE — 97535 SELF CARE MNGMENT TRAINING: CPT

## 2022-02-11 PROCEDURE — 87635 SARS-COV-2 COVID-19 AMP PRB: CPT

## 2022-02-11 PROCEDURE — 83036 HEMOGLOBIN GLYCOSYLATED A1C: CPT

## 2022-02-11 PROCEDURE — 36415 COLL VENOUS BLD VENIPUNCTURE: CPT

## 2022-02-11 PROCEDURE — 86803 HEPATITIS C AB TEST: CPT

## 2022-02-11 PROCEDURE — U0003: CPT

## 2022-02-11 RX ORDER — LACOSAMIDE 50 MG/1
100 TABLET ORAL
Refills: 0 | Status: DISCONTINUED | OUTPATIENT
Start: 2022-02-11 | End: 2022-02-24

## 2022-02-11 RX ORDER — INSULIN LISPRO 100/ML
VIAL (ML) SUBCUTANEOUS AT BEDTIME
Refills: 0 | Status: DISCONTINUED | OUTPATIENT
Start: 2022-02-11 | End: 2022-02-15

## 2022-02-11 RX ORDER — INSULIN LISPRO 100/ML
5 VIAL (ML) SUBCUTANEOUS
Refills: 0 | Status: DISCONTINUED | OUTPATIENT
Start: 2022-02-11 | End: 2022-02-11

## 2022-02-11 RX ORDER — ENOXAPARIN SODIUM 100 MG/ML
40 INJECTION SUBCUTANEOUS DAILY
Refills: 0 | Status: DISCONTINUED | OUTPATIENT
Start: 2022-02-12 | End: 2022-02-24

## 2022-02-11 RX ORDER — INSULIN LISPRO 100/ML
VIAL (ML) SUBCUTANEOUS
Refills: 0 | Status: DISCONTINUED | OUTPATIENT
Start: 2022-02-11 | End: 2022-02-15

## 2022-02-11 RX ORDER — DEXAMETHASONE 0.5 MG/5ML
2 ELIXIR ORAL
Refills: 0 | Status: DISCONTINUED | OUTPATIENT
Start: 2022-02-11 | End: 2022-02-24

## 2022-02-11 RX ORDER — ATORVASTATIN CALCIUM 80 MG/1
20 TABLET, FILM COATED ORAL AT BEDTIME
Refills: 0 | Status: DISCONTINUED | OUTPATIENT
Start: 2022-02-11 | End: 2022-02-24

## 2022-02-11 RX ORDER — DEXTROSE 50 % IN WATER 50 %
25 SYRINGE (ML) INTRAVENOUS ONCE
Refills: 0 | Status: DISCONTINUED | OUTPATIENT
Start: 2022-02-11 | End: 2022-02-24

## 2022-02-11 RX ORDER — INSULIN GLARGINE 100 [IU]/ML
16 INJECTION, SOLUTION SUBCUTANEOUS AT BEDTIME
Refills: 0 | Status: DISCONTINUED | OUTPATIENT
Start: 2022-02-11 | End: 2022-02-24

## 2022-02-11 RX ORDER — SODIUM CHLORIDE 9 MG/ML
2 INJECTION INTRAMUSCULAR; INTRAVENOUS; SUBCUTANEOUS EVERY 6 HOURS
Refills: 0 | Status: DISCONTINUED | OUTPATIENT
Start: 2022-02-11 | End: 2022-02-14

## 2022-02-11 RX ORDER — PANTOPRAZOLE SODIUM 20 MG/1
40 TABLET, DELAYED RELEASE ORAL
Refills: 0 | Status: DISCONTINUED | OUTPATIENT
Start: 2022-02-12 | End: 2022-02-24

## 2022-02-11 RX ORDER — DEXTROSE 50 % IN WATER 50 %
15 SYRINGE (ML) INTRAVENOUS ONCE
Refills: 0 | Status: DISCONTINUED | OUTPATIENT
Start: 2022-02-11 | End: 2022-02-24

## 2022-02-11 RX ORDER — POLYETHYLENE GLYCOL 3350 17 G/17G
17 POWDER, FOR SOLUTION ORAL EVERY 12 HOURS
Refills: 0 | Status: DISCONTINUED | OUTPATIENT
Start: 2022-02-11 | End: 2022-02-24

## 2022-02-11 RX ORDER — SENNA PLUS 8.6 MG/1
2 TABLET ORAL AT BEDTIME
Refills: 0 | Status: DISCONTINUED | OUTPATIENT
Start: 2022-02-11 | End: 2022-02-24

## 2022-02-11 RX ORDER — ACETAMINOPHEN 500 MG
650 TABLET ORAL EVERY 6 HOURS
Refills: 0 | Status: DISCONTINUED | OUTPATIENT
Start: 2022-02-11 | End: 2022-02-24

## 2022-02-11 RX ORDER — INSULIN LISPRO 100/ML
5 VIAL (ML) SUBCUTANEOUS
Refills: 0 | Status: DISCONTINUED | OUTPATIENT
Start: 2022-02-12 | End: 2022-02-15

## 2022-02-11 RX ORDER — GLUCAGON INJECTION, SOLUTION 0.5 MG/.1ML
1 INJECTION, SOLUTION SUBCUTANEOUS ONCE
Refills: 0 | Status: DISCONTINUED | OUTPATIENT
Start: 2022-02-11 | End: 2022-02-24

## 2022-02-11 RX ORDER — SODIUM CHLORIDE 9 MG/ML
1000 INJECTION, SOLUTION INTRAVENOUS
Refills: 0 | Status: DISCONTINUED | OUTPATIENT
Start: 2022-02-11 | End: 2022-02-24

## 2022-02-11 RX ORDER — DEXTROSE 50 % IN WATER 50 %
12.5 SYRINGE (ML) INTRAVENOUS ONCE
Refills: 0 | Status: DISCONTINUED | OUTPATIENT
Start: 2022-02-11 | End: 2022-02-24

## 2022-02-11 RX ADMIN — SODIUM CHLORIDE 2 GRAM(S): 9 INJECTION INTRAMUSCULAR; INTRAVENOUS; SUBCUTANEOUS at 18:14

## 2022-02-11 RX ADMIN — SODIUM CHLORIDE 2 GRAM(S): 9 INJECTION INTRAMUSCULAR; INTRAVENOUS; SUBCUTANEOUS at 07:08

## 2022-02-11 RX ADMIN — SODIUM CHLORIDE 2 GRAM(S): 9 INJECTION INTRAMUSCULAR; INTRAVENOUS; SUBCUTANEOUS at 00:01

## 2022-02-11 RX ADMIN — Medication 2: at 08:08

## 2022-02-11 RX ADMIN — LACOSAMIDE 100 MILLIGRAM(S): 50 TABLET ORAL at 19:27

## 2022-02-11 RX ADMIN — LACOSAMIDE 100 MILLIGRAM(S): 50 TABLET ORAL at 07:21

## 2022-02-11 RX ADMIN — Medication 5 UNIT(S): at 08:08

## 2022-02-11 RX ADMIN — POLYETHYLENE GLYCOL 3350 17 GRAM(S): 17 POWDER, FOR SOLUTION ORAL at 18:20

## 2022-02-11 RX ADMIN — ATORVASTATIN CALCIUM 20 MILLIGRAM(S): 80 TABLET, FILM COATED ORAL at 21:22

## 2022-02-11 RX ADMIN — INSULIN GLARGINE 16 UNIT(S): 100 INJECTION, SOLUTION SUBCUTANEOUS at 21:42

## 2022-02-11 RX ADMIN — Medication 2 MILLIGRAM(S): at 18:14

## 2022-02-11 RX ADMIN — Medication 5 UNIT(S): at 11:40

## 2022-02-11 RX ADMIN — PANTOPRAZOLE SODIUM 40 MILLIGRAM(S): 20 TABLET, DELAYED RELEASE ORAL at 07:08

## 2022-02-11 RX ADMIN — POLYETHYLENE GLYCOL 3350 17 GRAM(S): 17 POWDER, FOR SOLUTION ORAL at 07:08

## 2022-02-11 RX ADMIN — Medication 2 MILLIGRAM(S): at 07:08

## 2022-02-11 RX ADMIN — SODIUM CHLORIDE 2 GRAM(S): 9 INJECTION INTRAMUSCULAR; INTRAVENOUS; SUBCUTANEOUS at 23:10

## 2022-02-11 RX ADMIN — SODIUM CHLORIDE 2 GRAM(S): 9 INJECTION INTRAMUSCULAR; INTRAVENOUS; SUBCUTANEOUS at 11:40

## 2022-02-11 RX ADMIN — SENNA PLUS 2 TABLET(S): 8.6 TABLET ORAL at 21:22

## 2022-02-11 RX ADMIN — ENOXAPARIN SODIUM 40 MILLIGRAM(S): 100 INJECTION SUBCUTANEOUS at 11:40

## 2022-02-11 NOTE — H&P ADULT - HISTORY OF PRESENT ILLNESS
This is a 67 YO male with PMH of  HTN, DM2, and known brain mass. [place] was in [place] for vacation in early January, when pt was noted to be confused and not answering appropriately, so an MRI was done on Jan 7 showed L frontal enhancing mass with significant edema. He presented to [facility] on 1/24 for further evaluation and repeat CTH showed significant vasogenic edema and 1.5cm MLS. He underwent 1/27/22 for left sided craniotomy for tumor resection with gleolan.  Pt was started on decadron and Vimpat also.  Post op course was complicated by possible seizures with EEG negative. Hyponatermia with salt tabs added to regimen. 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 He was evaluated by PMR and was recommended for acute inpatient rehab. He was admitted to [facility] on 02/08/22, however COVID test came back positive. COVID tests from 02/04 and 02/06/22 were negative. Patient remains asymptomatic.     COVID swab from 2/9/2022 and 2/10/2022 are both negative. Per medical team, he does not need isolation at this time. COVID swab from 2/8/2022 was likely a false positive. He was seen by ENT for hoarseness - vocal cords mobile but with incomplete closure and Continue with speech and swallow therapy. Follow up with ENT/Laryngology, Dr. Graham Bergeron [phone] 2 weeks after discharge. Patient stable to return to [facility]  on 2/11/22.

## 2022-02-11 NOTE — PATIENT PROFILE ADULT - FALL HARM RISK - HARM RISK INTERVENTIONS
Assistance with ambulation/Assistance OOB with selected safe patient handling equipment/Communicate Risk of Fall with Harm to all staff/Reinforce activity limits and safety measures with patient and family/Tailored Fall Risk Interventions/Use of alarms - bed, chair and/or voice tab/Visual Cue: Yellow wristband and red socks/Bed in lowest position, wheels locked, appropriate side rails in place/Call bell, personal items and telephone in reach/Instruct patient to call for assistance before getting out of bed or chair/Non-slip footwear when patient is out of bed/Foster to call system/Physically safe environment - no spills, clutter or unnecessary equipment/Purposeful Proactive Rounding/Room/bathroom lighting operational, light cord in reach

## 2022-02-11 NOTE — H&P ADULT - NSHPPHYSICALEXAM_GEN_ALL_CORE
PHYSICAL EXAM  VITALS  T(C): 36.9 (02-11-22 @ 15:27), Max: 36.9 (02-11-22 @ 15:27)  HR: 68 (02-11-22 @ 15:27) (67 - 68)  BP: 101/66 (02-11-22 @ 15:27) (101/66 - 104/65)  RR: 16 (02-11-22 @ 15:27) (12 - 16)  SpO2: 98% (02-11-22 @ 15:27) (95% - 98%)    Gen - NAD, Comfortable  HEENT - NCAT, EOMI  Neck - Supple, No limited ROM  Pulm - CTAB,   Cardiovascular - RRR, S1S2  Abdomen - Soft, NT/ND, +BS  Extremities - No C/C/E, No calf tenderness  Neuro-     Cognitive - AAOx3, names, repeats , follows commands      Communication - Fluent, mild dysarthrias     Attention:  mildly impaired      Memory: Recall 3 objects immediate and 1 min later         Cranial Nerves - no VF cut , EOMI, + LR, face is symmetric,  hoarseness, mild dysarthria, no dysphagia      Motor -                    Left UE/LE  5/5                   RIght  UE/LE 5-/5 with pronator drift      Sensory - Intact to LT     Reflexes - DTR Intact,      Coordination - FTN intact     Tone - normal  Psychiatric - Mood stable, Affect WNL    Craniotomy site is healing well, fully closed, staples are intact

## 2022-02-11 NOTE — DISCHARGE NOTE NURSING/CASE MANAGEMENT/SOCIAL WORK - PATIENT PORTAL LINK FT
You can access the FollowMyHealth Patient Portal offered by [facility] by registering at the following website: [email or web address]. By joining [employer]’s FollowMyHealth portal, you will also be able to view your health information using other applications (apps) compatible with our system.

## 2022-02-11 NOTE — H&P ADULT - NSHPSOCIALHISTORY_GEN_ALL_CORE
Smoking - Denied  EtOH - Denied   Drugs - Denied     Patient lives in room in elevated apartment with 1 DAVID  PTA: Independent in ADLs and ambulation     CURRENT FUNCTIONAL STATUS  Bed Mobility:   Transfers:   Gait:

## 2022-02-11 NOTE — H&P ADULT - NSICDXPASTMEDICALHX_GEN_ALL_CORE_FT
PAST MEDICAL HISTORY:  Diabetes mellitus     Glioblastoma just diagnosed - 1/22/22    Hypertension

## 2022-02-11 NOTE — PROGRESS NOTE ADULT - SUBJECTIVE AND OBJECTIVE BOX
Patient is a 66y old  Male who presents with a chief complaint of COVID +ve (10 Feb 2022 18:19)    NO events overnight  Denies chest pain, SOB  Patient seen and examined at bedside.    ALLERGIES:  No Known Allergies    MEDICATIONS  (STANDING):  atorvastatin 20 milliGRAM(s) Oral at bedtime  dexAMETHasone     Tablet 2 milliGRAM(s) Oral two times a day  dextrose 40% Gel 15 Gram(s) Oral once  dextrose 5%. 1000 milliLiter(s) (50 mL/Hr) IV Continuous <Continuous>  dextrose 5%. 1000 milliLiter(s) (100 mL/Hr) IV Continuous <Continuous>  dextrose 50% Injectable 25 Gram(s) IV Push once  dextrose 50% Injectable 12.5 Gram(s) IV Push once  dextrose 50% Injectable 25 Gram(s) IV Push once  enoxaparin Injectable 40 milliGRAM(s) SubCutaneous daily  glucagon  Injectable 1 milliGRAM(s) IntraMuscular once  insulin glargine Injectable (LANTUS) 16 Unit(s) SubCutaneous at bedtime  insulin lispro (ADMELOG) corrective regimen sliding scale   SubCutaneous three times a day before meals  insulin lispro (ADMELOG) corrective regimen sliding scale   SubCutaneous at bedtime  insulin lispro Injectable (ADMELOG) 5 Unit(s) SubCutaneous before breakfast  insulin lispro Injectable (ADMELOG) 5 Unit(s) SubCutaneous before lunch  insulin lispro Injectable (ADMELOG) 5 Unit(s) SubCutaneous before dinner  lacosamide Solution 100 milliGRAM(s) Oral two times a day  pantoprazole    Tablet 40 milliGRAM(s) Oral before breakfast  polyethylene glycol 3350 17 Gram(s) Oral every 12 hours  senna 2 Tablet(s) Oral at bedtime  sodium chloride 2 Gram(s) Oral every 6 hours    MEDICATIONS  (PRN):  acetaminophen     Tablet .. 650 milliGRAM(s) Oral every 6 hours PRN Temp greater or equal to 38C (100.4F), Mild Pain (1 - 3)    Vital Signs Last 24 Hrs  T(F): 97.8 (11 Feb 2022 06:34), Max: 98.5 (10 Feb 2022 15:57)  HR: 68 (11 Feb 2022 06:34) (67 - 74)  BP: 104/61 (11 Feb 2022 06:34) (104/61 - 112/64)  RR: 12 (11 Feb 2022 06:34) (12 - 16)  SpO2: 95% (11 Feb 2022 06:34) (95% - 98%)  I&O's Summary    BMI (kg/m2): 23.3 (02-08-22 @ 18:48)  PHYSICAL EXAM:  General: NAD, A/O x 3  ENT: MMM, no scleral icterus  Neck: Supple, No JVD, no thyroidomegaly  Lungs: Clear to auscultation bilaterally, no wheezes, no rales, no rhonchi, good inspiratory effort  Cardio: RRR, S1/S2, No murmurs  Abdomen: Soft, Nontender, Nondistended; Bowel sounds present  Extremities: No calf tenderness, No pitting edema, no skin changes    LABS:                        11.6   8.58  )-----------( 163      ( 10 Feb 2022 05:45 )             35.3       02-10    135  |  100  |  19  ----------------------------<  175  3.9   |  29  |  0.92    Ca    8.4      10 Feb 2022 05:45    TPro  6.0  /  Alb  2.5  /  TBili  0.9  /  DBili  x   /  AST  13  /  ALT  32  /  AlkPhos  74  02-08     eGFR if Non African American: 87 mL/min/1.73M2 (02-10-22 @ 05:45)  eGFR if African American: 101 mL/min/1.73M2 (02-10-22 @ 05:45)      POCT Blood Glucose.: 128 mg/dL (10 Feb 2022 22:24)  POCT Blood Glucose.: 107 mg/dL (10 Feb 2022 17:04)  POCT Blood Glucose.: 185 mg/dL (10 Feb 2022 11:43)  POCT Blood Glucose.: 164 mg/dL (10 Feb 2022 08:03)    COVID-19 PCR: NotDetec (02-10-22 @ 11:57)  COVID-19 PCR: NotDetec (02-09-22 @ 10:20)  COVID-19 PCR: Detected (02-08-22 @ 22:50)  COVID-19 PCR: NotDetec (02-06-22 @ 13:16)  COVID-19 PCR: NotDetec (02-04-22 @ 12:20)    COVID-19 PCR: NotDetec (02-10-22 @ 11:57)  COVID-19 PCR: NotDetec (02-09-22 @ 10:20)  COVID-19 PCR: Detected (02-08-22 @ 22:50)  COVID-19 PCR: NotDetec (02-06-22 @ 13:16)  COVID-19 PCR: NotDetec (02-04-22 @ 12:20)  COVID-19 PCR: NotDetec (01-26-22 @ 22:36)  COVID-19 PCR: NotDetec (01-24-22 @ 19:40)

## 2022-02-11 NOTE — PROGRESS NOTE ADULT - ASSESSMENT
66M hx HTN and DM2 was in the [place] for vacation in early January, when pt was noted to be confused and not answering appropriately, so an MRI was done on Jan 7 showed L frontal enhancing mass with significant edema.  He presented to [facility] on 1/24 for further evaluation and repeat CTH showed significant vasogenic edema and 1.5cm MLS. He underwent 1/27/22 for left sided craniotomy for tumor resection with gleolan.  Pt was started on decadron and Vimpat also.  Post op course was complicated by possible seizures with EEG negative. Hyponatremia with salt tabs added to regimen. 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 He was evaluated by PMR and was recommended for acute inpatient rehab. He was admitted to [facility] last night, 02/08/22, however COVID test came back positive.  COVID tests from 02/04 and 02/06/22 were negative.    COVID +  -Unclear if this is a false positive as pt has had two negative tests. He is asymptomatic w/o respiratory sx  -Will swab for COVID today as well, c/w isolation precautions until this result comes back  -ordered a portable cxr to assess for lung involvement    Left frontal tumor s/p craniotomy for resection with right sided drift, Gait Instability, ADL impairments and Functional impairments  - discharge back to rehab if COVID swab from 2/10 negative  - cont PT/OT/Speech while admitted  - Monitor incision -staples to be removed 2/10.....  -Continue Vimpat 100mg BID for seizure prophylaxis  -Continue Decadron slow taper- now on 2mg Q12h - to maintain until follow up with neurosurgery   -Will require follow up with neuro-oncology     Dysphagia  Hoarseness  -cont current diet of easy to chew, thin liquids. small and single sips.   -ENT consulted for hoarseness - d/w Randy    #Hyponatermia  - fluid restriction  -Continue salt tabs 2G Q6h    #HTN  -BP low, lisinopril stopped.     #HLD   -Continue atorvastatin 20mg daily    #Type 2 DM  - A1c is 10.7 (1/25)  -FS and ISS  -Continue Lantus 15 units at bedtime  - BS borderline low. Reduce aspart to 5 units tid wm.   -will adjust insulin as needed.    DVT PPX   AM labs  DISP: PMR evaluation placed, pending COVID swab from today
  66M hx HTN and DM2 was in [place] for vacation in early January, when pt was noted to be confused and not answering appropriately, so an MRI was done on Jan 7 showed L frontal enhancing mass with significant edema.  He presented to [facility] on 1/24 for further evaluation and repeat CTH showed significant vasogenic edema and 1.5cm MLS. He underwent 1/27/22 for left sided craniotomy for tumor resection with gleolan.  Pt was started on decadron and Vimpat also.  Post op course was complicated by possible seizures with EEG negative. Hyponatremia with salt tabs added to regimen. 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 He was evaluated by PMR and was recommended for acute inpatient rehab. He was admitted to [facility] last night, 02/08/22, however COVID test came back positive.  COVID tests from 02/04 and 02/06/22 were negative.    Unlikely to be COVID +; likely to be false negative  -WHile in the inpatient side,  pt has had two negative tests. He is asymptomatic w/o respiratory sx  -D/c isolation precaution  - Stable for discharge to Acute rehab    Left frontal tumor s/p craniotomy for resection with right sided drift, Gait Instability, ADL impairments and Functional impairments  - discharge back to rehab  - cont PT/OT/Speech while admitted  - Monitor incision -staples to be removed 2/10.....  -Continue Vimpat 100mg BID for seizure prophylaxis  -Continue Decadron slow taper- now on 2mg Q12h - to maintain until follow up with neurosurgery   -Will require follow up with neuro-oncology     Dysphagia  Hoarseness  -Appreciate ENT consult; to f/u as o/p:  ENT/Laryngology, Dr. Graham Bergeron [phone] 2 weeks after discharge.  -Vocal cords mobile but with incomplete closure  -C/w speech and swallow therapy  -cont current diet of easy to chew, thin liquids. small and single sips.     #Hyponatermia  - fluid restriction  -Continue salt tabs 2G Q6h    #HTN  -BP low, lisinopril stopped.     #HLD   -Continue atorvastatin 20mg daily    #Type 2 DM  - A1c is 10.7 (1/25)  -FS and ISS  -Continue Lantus 15 units at bedtime  - BS borderline low. Reduce aspart to 5 units tid wm.   -will adjust insulin as needed.    DVT PPX   DISP: Stable for transfer to acute rehab  D/c 45 min

## 2022-02-11 NOTE — DISCHARGE NOTE NURSING/CASE MANAGEMENT/SOCIAL WORK - NSDCPEFALRISK_GEN_ALL_CORE
For information on Fall & Injury Prevention, visit: [email or web address] OR  [email or web address] OR  https://www.cdc.gov/steadi/patient.html

## 2022-02-11 NOTE — H&P ADULT - ASSESSMENT
ASSESSMENT/PLAN  This is a 65 YO male with PMH of  HTN, DM2, and known brain mass. He was in the [place] for vacation in early January, when he was noted to be confused and not answering appropriately, so an MRI was done on Jan 7 showed L frontal enhancing mass with significant edema. He presented to [facility] on 1/24 for further evaluation and repeat CTH which  showed significant vasogenic edema and 1.5cm MLS. He underwent 1/27/22 for left sided craniotomy for tumor resection with gleolan. Post-op course was complicated by possible seizures with EEG negative, hyponatermia with salt tabs added to regimen. 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 He was admitted to [facility] but tested positive for covid. He was asymptomatic but trasnferred to medical floor for quarantine. His subsequent covid swabs came back negative. Patient was deemed stable to return to [facility]  on 2/11/22. Patient  with gait Instability, ADL impairments and Functional impairments.    #Left frontal tumor s/p craniotomy for resection with right sided drift, Gait Instability, ADL impairments and Functional impairments  - Start Comprehensive Rehab Program of PT/OT/SLP  - Monitor incision -staples to be removed 2/10 ?  -Continue Vimpat 100mg BID for seizure prophylaxis  -Continue Decadron slow taper- now on 2mg Q12h - to maintain until follow up with neurosurgery   -Will require follow up with neuro-oncology     #Hyponatermia  -Continue fluid restriction  -Continue salt tabs 2G Q6h    #HTN  -Continue lisinopril 5mg daily    #HLD   -Continue atorvastatin 20mg daily    #Type 2 DM  - A1c is 10.7 (1/25)  -FS and ISS  -Continue Lantus 15 units at bedtime  - continue premeal admelog 8U at breakfast and dinner and 6U at lunch    #Pain control  - Tylenol PRN    #Covid 19  - positive on 2/8- likely false positive  - Negative  2/9 and 2/10    #GI/Bowel Mgmt   - Continue Senna at bedtime   - Miralax BID    #Bladder management  - Continue to monitor PVR q 8 hours (SC if > 400)  -Monitor UO    #DVT  - Lovenox  - SCDs  - TEDs     #Skin:  -Incisional wound care per nursing. Surgical staples in place, wound intact    FEN   - Diet - Easy to Chew+ Thins  [CCHO, DASH/TLC]    - Dysphagia  SLP - evaluation and treatment      #Precaution  - Fall, Aspiration, cardiac      #Skin:  - No active issues at this time  - Pressure injury/Skin: Turn Q2hrs while in bed, OOB to Chair, PT/OT     #Dysphagia    - SLP: evaluation and treatment    #Mood/Cognition:  - Neuropsychology consult PRN    #Sleep:   - Maintain quiet hours and low stim environment.  - Melatonin PRN to maximize participation in therapy during the day.   - Monitor sleep logs    Outpatient Follow-up (Specialty/Name of physician):  Parker hook)  Neurosurgery  [address] [place] [address]  Phone: [phone]  Fax: [phone]  Follow Up Time:     Matthew Hill)  Neurology  [facility], [address] [place] [address]  Phone: [phone]  Fax: [phone]  Follow Up Time:     Kristopher Guevara  RADIATION ONCOLOGY  [address] [place] [address] - [place] [address]  Phone: [phone]  Fax: [phone]      MEDICAL PROGNOSIS: GOOD            REHAB POTENTIAL: GOOD             ESTIMATED DISPOSITION: HOME WITH HOME CARE            ELOS: 10-14 Days   EXPECTED THERAPY:     P.T. 1hr/day       O.T. 1hr/day      S.L.P. 1hr/day     P&O Unnecessary     EXP FREQUENCY: 5 days per 7 day period     PRESCREEN COMPARISON:   I have reviewed the prescreen information and I have found no relevant changes between the preadmission screening and my post admission evaluation     RATIONALE FOR INPATIENT ADMISSION - Patient demonstrates the following: (check all that apply)  [X] Medically appropriate for rehabilitation admission  [X] Has attainable rehab goals with an appropriate initial discharge plan  [X] Has rehabilitation potential (expected to make a significant improvement within a reasonable period of time)   [X] Requires close medical management by a rehab physician, rehab nursing care, Hospitalist and comprehensive interdisciplinary team (including PT, OT, & or SLP, Prosthetics and Orthotics)

## 2022-02-12 LAB
ALBUMIN SERPL ELPH-MCNC: 2.5 G/DL — LOW (ref 3.3–5)
ALP SERPL-CCNC: 65 U/L — SIGNIFICANT CHANGE UP (ref 40–120)
ALT FLD-CCNC: 23 U/L — SIGNIFICANT CHANGE UP (ref 10–45)
ANION GAP SERPL CALC-SCNC: 9 MMOL/L — SIGNIFICANT CHANGE UP (ref 5–17)
AST SERPL-CCNC: 18 U/L — SIGNIFICANT CHANGE UP (ref 10–40)
BILIRUB SERPL-MCNC: 0.8 MG/DL — SIGNIFICANT CHANGE UP (ref 0.2–1.2)
BUN SERPL-MCNC: 15 MG/DL — SIGNIFICANT CHANGE UP (ref 7–23)
CALCIUM SERPL-MCNC: 8.6 MG/DL — SIGNIFICANT CHANGE UP (ref 8.4–10.5)
CHLORIDE SERPL-SCNC: 100 MMOL/L — SIGNIFICANT CHANGE UP (ref 96–108)
CO2 SERPL-SCNC: 25 MMOL/L — SIGNIFICANT CHANGE UP (ref 22–31)
CREAT SERPL-MCNC: 0.76 MG/DL — SIGNIFICANT CHANGE UP (ref 0.5–1.3)
GLUCOSE BLDC GLUCOMTR-MCNC: 166 MG/DL — HIGH (ref 70–99)
GLUCOSE BLDC GLUCOMTR-MCNC: 181 MG/DL — HIGH (ref 70–99)
GLUCOSE BLDC GLUCOMTR-MCNC: 221 MG/DL — HIGH (ref 70–99)
GLUCOSE BLDC GLUCOMTR-MCNC: 228 MG/DL — HIGH (ref 70–99)
GLUCOSE SERPL-MCNC: 194 MG/DL — HIGH (ref 70–99)
HCT VFR BLD CALC: 34.9 % — LOW (ref 39–50)
HGB BLD-MCNC: 11.5 G/DL — LOW (ref 13–17)
MCHC RBC-ENTMCNC: 31.7 PG — SIGNIFICANT CHANGE UP (ref 27–34)
MCHC RBC-ENTMCNC: 33 GM/DL — SIGNIFICANT CHANGE UP (ref 32–36)
MCV RBC AUTO: 96.1 FL — SIGNIFICANT CHANGE UP (ref 80–100)
NRBC # BLD: 0 /100 WBCS — SIGNIFICANT CHANGE UP (ref 0–0)
PLATELET # BLD AUTO: 50 K/UL — LOW (ref 150–400)
POTASSIUM SERPL-MCNC: 4.5 MMOL/L — SIGNIFICANT CHANGE UP (ref 3.5–5.3)
POTASSIUM SERPL-SCNC: 4.5 MMOL/L — SIGNIFICANT CHANGE UP (ref 3.5–5.3)
PROT SERPL-MCNC: 6.3 G/DL — SIGNIFICANT CHANGE UP (ref 6–8.3)
RBC # BLD: 3.63 M/UL — LOW (ref 4.2–5.8)
RBC # FLD: 13.4 % — SIGNIFICANT CHANGE UP (ref 10.3–14.5)
SODIUM SERPL-SCNC: 134 MMOL/L — LOW (ref 135–145)
WBC # BLD: 7.84 K/UL — SIGNIFICANT CHANGE UP (ref 3.8–10.5)
WBC # FLD AUTO: 7.84 K/UL — SIGNIFICANT CHANGE UP (ref 3.8–10.5)

## 2022-02-12 PROCEDURE — 99223 1ST HOSP IP/OBS HIGH 75: CPT

## 2022-02-12 PROCEDURE — 99232 SBSQ HOSP IP/OBS MODERATE 35: CPT

## 2022-02-12 RX ADMIN — Medication 5 UNIT(S): at 08:27

## 2022-02-12 RX ADMIN — ENOXAPARIN SODIUM 40 MILLIGRAM(S): 100 INJECTION SUBCUTANEOUS at 11:16

## 2022-02-12 RX ADMIN — ATORVASTATIN CALCIUM 20 MILLIGRAM(S): 80 TABLET, FILM COATED ORAL at 21:10

## 2022-02-12 RX ADMIN — LACOSAMIDE 100 MILLIGRAM(S): 50 TABLET ORAL at 17:07

## 2022-02-12 RX ADMIN — SODIUM CHLORIDE 2 GRAM(S): 9 INJECTION INTRAMUSCULAR; INTRAVENOUS; SUBCUTANEOUS at 11:16

## 2022-02-12 RX ADMIN — Medication 2: at 08:27

## 2022-02-12 RX ADMIN — Medication 2 MILLIGRAM(S): at 17:06

## 2022-02-12 RX ADMIN — SODIUM CHLORIDE 2 GRAM(S): 9 INJECTION INTRAMUSCULAR; INTRAVENOUS; SUBCUTANEOUS at 05:52

## 2022-02-12 RX ADMIN — SODIUM CHLORIDE 2 GRAM(S): 9 INJECTION INTRAMUSCULAR; INTRAVENOUS; SUBCUTANEOUS at 17:06

## 2022-02-12 RX ADMIN — SODIUM CHLORIDE 2 GRAM(S): 9 INJECTION INTRAMUSCULAR; INTRAVENOUS; SUBCUTANEOUS at 23:19

## 2022-02-12 RX ADMIN — Medication 5 UNIT(S): at 17:07

## 2022-02-12 RX ADMIN — Medication 1: at 11:51

## 2022-02-12 RX ADMIN — Medication 1: at 17:07

## 2022-02-12 RX ADMIN — Medication 2 MILLIGRAM(S): at 05:52

## 2022-02-12 RX ADMIN — PANTOPRAZOLE SODIUM 40 MILLIGRAM(S): 20 TABLET, DELAYED RELEASE ORAL at 05:52

## 2022-02-12 RX ADMIN — LACOSAMIDE 100 MILLIGRAM(S): 50 TABLET ORAL at 05:52

## 2022-02-12 RX ADMIN — SENNA PLUS 2 TABLET(S): 8.6 TABLET ORAL at 21:10

## 2022-02-12 RX ADMIN — INSULIN GLARGINE 16 UNIT(S): 100 INJECTION, SOLUTION SUBCUTANEOUS at 21:09

## 2022-02-12 RX ADMIN — Medication 5 UNIT(S): at 11:51

## 2022-02-12 RX ADMIN — POLYETHYLENE GLYCOL 3350 17 GRAM(S): 17 POWDER, FOR SOLUTION ORAL at 05:52

## 2022-02-12 NOTE — DIETITIAN INITIAL EVALUATION ADULT. - DIET TYPE
Education Provided on Need for Supplementation/1500ml/consistent carbohydrate (evening snack)/supplement (specify)/easy to chew

## 2022-02-12 NOTE — CONSULT NOTE ADULT - ASSESSMENT
65 y/o man with PMH of  HTN, DM2, and recent discovery of brain Glioblastoma s/p 1/27/22 for left sided craniotomy. Post-op course was complicated by possible hyponatermia with salt tabs added to regimen. 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 He was admitted to [facility] for functional and gair impairment     Rehab: Functional and gait instability:  - C/w PT/OT per primary team     CBS: Left frontal GBM s/p craniotomy for resection with right sided drift  -Continue Vimpat 100mg BID for seizure prophylaxis  -Continue Decadron slow taper- now on 2mg Q12h - to maintain until follow up with neurosurgery   -Will require follow up with neuro-oncology     Hyponatermia  -Continue fluid restriction  -Continue salt tabs 2G Q6h    HTN  -Continue lisinopril 5mg daily    HLD   -Continue atorvastatin 20mg daily    Type 2 DM  - A1c is 10.7 (1/25)  -FS and ISS  -Continue Lantus 16 units at bedtime  - continue premeal admelog 5U at breakfast and dinner and 6U at lunch    DVT PPX  - Lovenox  - SCDs  - TEDs

## 2022-02-12 NOTE — DIETITIAN INITIAL EVALUATION ADULT. - OTHER INFO
Initial Nutrition Assessment   66yr Old Male   Dx: Neoplasm of Brain  Diet - Consistent Carbohydrate DASH-TLC Regular - Easy to Chew (IDDSI Level 7) Diet w/ Thin Liquids (IDDSI Level 0) & 1,500ml/day Fluid Restriction   Recommend Change Diet to Consistent Carbohydrate Diet (Continue Consistency Per Speech Therapy) & Recommend Initiate Glucerna 8oz PO BID)  Denies Food Allergy/Intolerance  Tolerates Diet Well - No Chewing/Swallowing Complications (Per Patient)  Surgical Incision on Head & No Pressure Ulcers (as Per Nursing Flow Sheets)  No Edema Noted (as Per Nursing Flow Sheets)  No Recent Nausea/Vomiting/Diarrhea/Constipation (as Per Patient)

## 2022-02-12 NOTE — DIETITIAN INITIAL EVALUATION ADULT. - PHYSCIAL ASSESSMENT
Weight Stable over Last 6 Months (Per Patient)   States UBW - 150lb   Obtain New Weight to Confirm Weight

## 2022-02-12 NOTE — DIETITIAN INITIAL EVALUATION ADULT. - ORAL INTAKE PTA/DIET HISTORY
Patient Does Not Follow Diet @Home, Consumes 2-3 Meals a Day & Doesn't Take Vitamin/Supplements @Home    on Consistent Carbohydrate DASH-TLC Regular - Easy to Chew (IDDSI Level 7) Diet & 1,500ml/day Fluid Restriction  Recommend Change Diet to Consistent Carbohydrate Diet (on NaCl, Fluid Restriction  & Recent Na+ Low)  Recommend Initiate Glucerna 8oz PO BID (Provides 440kcal-20grams of Protein) - Education Provided on Need for Supplementation

## 2022-02-12 NOTE — DIETITIAN INITIAL EVALUATION ADULT. - ADD RECOMMEND
1) Monitor Weights, Intake, Tolerance, Skin, POCT & Labwork   2) Glucerna 8oz PO BID 3) Recommend Change Diet to Consistent Carbohydrate Regular - Easy to Chew (IDDSI Level 7) Diet 4) Education Provided on Need for Supplementation 5) Continue Nutrition Plan of Care

## 2022-02-12 NOTE — PROGRESS NOTE ADULT - CONSTITUTIONAL COMMENTS
eyes open spontaneously. reduced sustained attention, yes/no simple information appears reliable, follows simple commands consistnetly

## 2022-02-12 NOTE — DIETITIAN NUTRITION RISK NOTIFICATION - TREATMENT: THE FOLLOWING DIET HAS BEEN RECOMMENDED
Recommend Change Diet to Consistent Carbohydrate Diet  Recommend Initiate Glucerna 8oz PO BID (Provides 440kcal-20grams of Protein)   Education Provided on Need for Supplementation

## 2022-02-12 NOTE — CONSULT NOTE ADULT - SUBJECTIVE AND OBJECTIVE BOX
Patient is a 66y old  Male who presents with a chief complaint of Glioblastoma (12 Feb 2022 13:41)    HPI:     67 YO man with PMH of  HTN, DM2, and known brain mass.hx of confusion had MRI showing L frontal enhancing mass with significant edema. He presented to [facility] on 1/24 for further evaluation and repeat CTH showed significant vasogenic edema and 1.5cm MLS. He underwent 1/27/22 for left sided craniotomy for tumor resection  Pt was started on decadron and Vimpat also.  Post op course was complicated by possible seizures with EEG negative. Hyponatermia with salt tabs added to regimen. 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 He was evaluated by PMR and was recommended for acute inpatient rehab. He was admitted to [facility] on 02/08/22, however COVID test came back positive. COVID tests from 02/04 and 02/06/22 were negative. Patient remains asymptomatic.     Patient admitted to [facility] for Functional and gait instability:    PAST MEDICAL & SURGICAL HISTORY:  Diabetes mellitus    Hypertension    Glioblastoma  just diagnosed - 1/22/22    S/P craniotomy        Father: - at age - with history of   Mother: - at age - with history of       Substance Use (street drugs): (  ) never used  (  ) other:  Tobacco Usage:  (   ) never smoked   (   ) former smoker   (   ) current smoker  (     ) pack year  Alcohol Usage:  Sexual History:   Recent Travel:      Allergies    No Known Allergies    Intolerances        dextrose 40% Gel 15 Gram(s) Oral once  dextrose 5%. 1000 milliLiter(s) IV Continuous <Continuous>  dextrose 5%. 1000 milliLiter(s) IV Continuous <Continuous>  dextrose 50% Injectable 25 Gram(s) IV Push once  dextrose 50% Injectable 12.5 Gram(s) IV Push once  dextrose 50% Injectable 25 Gram(s) IV Push once  glucagon  Injectable 1 milliGRAM(s) IntraMuscular once  insulin glargine Injectable (LANTUS) 16 Unit(s) SubCutaneous at bedtime  insulin lispro (ADMELOG) corrective regimen sliding scale   SubCutaneous three times a day before meals  insulin lispro (ADMELOG) corrective regimen sliding scale   SubCutaneous at bedtime  insulin lispro Injectable (ADMELOG) 5 Unit(s) SubCutaneous three times a day before meals      REVIEW OF SYSTEMS:  CONSTITUTIONAL: No fever, weight loss, or fatigue  EYES: No eye pain, visual disturbances, or discharge  ENMT:  No difficulty hearing, tinnitus, vertigo; No sinus or throat pain  NECK: No pain or stiffness  BREASTS: No pain, masses, or nipple discharge  RESPIRATORY: No cough, wheezing, chills or hemoptysis; No shortness of breath  CARDIOVASCULAR: No chest pain, palpitations, dizziness, or leg swelling  GASTROINTESTINAL: No abdominal or epigastric pain. No nausea, vomiting, or hematemesis; No diarrhea or constipation. No melena or hematochezia.  GENITOURINARY: No dysuria, frequency, hematuria, or incontinence  NEUROLOGICAL: No headaches, memory loss, loss of strength, numbness, or tremors  SKIN: No itching, burning, rashes, or lesions   LYMPH NODES: No enlarged glands  ENDOCRINE: No heat or cold intolerance; No hair loss  MUSCULOSKELETAL: No joint pain or swelling; No muscle, back, or extremity pain  PSYCHIATRIC: No depression, anxiety, mood swings, or difficulty sleeping  HEME/LYMPH: No easy bruising, or bleeding gums  ALLERY AND IMMUNOLOGIC: No hives or eczema    ALL ROS REVIEWED AND NORMAL EXCEPT AS STATED ABOVE    T(C): 36.4 (02-12-22 @ 07:30), Max: 36.4 (02-11-22 @ 21:27)  HR: 59 (02-12-22 @ 07:30) (59 - 63)  BP: 107/64 (02-12-22 @ 07:30) (100/65 - 118/69)  RR: 15 (02-12-22 @ 07:30) (14 - 15)  SpO2: 96% (02-12-22 @ 07:30) (96% - 98%)  Wt(kg): --Vital Signs Last 24 Hrs  T(C): 36.4 (12 Feb 2022 07:30), Max: 36.4 (11 Feb 2022 21:27)  T(F): 97.5 (12 Feb 2022 07:30), Max: 97.6 (11 Feb 2022 21:27)  HR: 59 (12 Feb 2022 07:30) (59 - 63)  BP: 107/64 (12 Feb 2022 07:30) (100/65 - 118/69)  BP(mean): --  RR: 15 (12 Feb 2022 07:30) (14 - 15)  SpO2: 96% (12 Feb 2022 07:30) (96% - 98%)    PHYSICAL EXAM:      LABS:                        11.5   7.84  )-----------( 50       ( 12 Feb 2022 05:30 )             34.9     02-12    134<L>  |  100  |  15  ----------------------------<  194<H>  4.5   |  25  |  0.76    Ca    8.6      12 Feb 2022 05:30    TPro  6.3  /  Alb  2.5<L>  /  TBili  0.8  /  DBili  x   /  AST  18  /  ALT  23  /  AlkPhos  65  02-12         CAPILLARY BLOOD GLUCOSE      POCT Blood Glucose.: 166 mg/dL (12 Feb 2022 11:49)  POCT Blood Glucose.: 228 mg/dL (12 Feb 2022 08:17)  POCT Blood Glucose.: 224 mg/dL (11 Feb 2022 21:20)  POCT Blood Glucose.: 104 mg/dL (11 Feb 2022 16:44)            RADIOLOGY & ADDITIONAL TESTS:    Consultant(s) Notes Reviewed:  [x ] YES  [ ] NO  Care Discussed with Consultants/Other Providers [ x] YES  [ ] NO  Imaging Personally Reviewed:  [ ] YES  [ ] NO

## 2022-02-12 NOTE — PROGRESS NOTE ADULT - COMMENTS
Patient denies H./A. reports sleeping well last night. Incision healing well, staples intact, no drainage or redness, no soft tissue/subcutaneous swelling. no N.V, tremors    +has some sore throat and dysphonia

## 2022-02-12 NOTE — DIETITIAN INITIAL EVALUATION ADULT. - REASON INDICATOR FOR ASSESSMENT
Initial Assessment - RD Screen - Hemoglobin A1c >8  Patient Speaks [place]ian (Able to Speak English Preferred to Speak in Belarusian), RD is Fluent in [place]ian

## 2022-02-12 NOTE — PROGRESS NOTE ADULT - ASSESSMENT
This is a 65 YO male with PMH of  HTN, DM2, p/w  L frontal enhancing mass with significant edema s/p left sided craniotomy and tumor resection with gleolan. Post-op course was complicated by possible seizures. 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    #Left frontal tumor s/p craniotomy  - f/u NSGY re: timing staple removal as post 2 weeks  -Continue Vimpat 100mg BID for seizure prophylaxis  -Continue Decadron taper, on 2mg Q12h  - continue Comprehensive Rehab Program of PT/OT/SLP 3 hours daily 5 x week  -Will require follow up with neuro-oncology     #Covid 19  - positive on 2/8- likely false positive  - Negative  2/9 and 2/10    # Hyponatermia  -Continue fluid restriction  -Continue salt tabs 2G Q6h  - Na 134 2/12    #HTN  - lisinopril 5mg daily  - (92/56 - 118/69). f/u hospitalist ?reduce lisinopril 2.5     #HLD   - atorvastatin 20mg daily    #Type 2 DM  - A1c is 10.7 (1/25)  -FS and ISS  -Continue Lantus 15 units qhs  - premeal admelog 8U at breakfast and dinner and 6U at lunch    #Pain control  - Tylenol PRN    #GI/Bowel Mgmt   - Continue Senna at bedtime   - Miralax BID    #DVT  - Lovenox  - SCDs, TEDs     # FEN   - Diet - Easy to Chew+ Thins  [CCHO, DASH/TLC]    - Dysphagia  SLP - evaluation and treatment          Outpatient Follow-up (Specialty/Name of physician):  Parker hook)  Neurosurgery  [address] [place] [address]  Phone: [phone]  Fax: [phone]  Follow Up Time:     Matthew Hill)  Neurology  [facility], [address] [place] [address]  Phone: [phone]  Fax: [phone]  Follow Up Time:     Kristopher Guevara  RADIATION ONCOLOGY  [address] [place] [address] A [place] [address]  Phone: [phone]  Fax: [phone]     This is a 67 YO male with PMH of  HTN, DM2, p/w  L frontal enhancing mass with significant edema s/p left sided craniotomy and tumor resection with gleolan. Post-op course was complicated by possible seizures. 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    #Left frontal tumor s/p craniotomy  - f/u NSGY re: timing staple removal as post 2 weeks  -Continue Vimpat 100mg BID for seizure prophylaxis  -Continue Decadron taper, on 2mg Q12h  - continue Comprehensive Rehab Program of PT/OT/SLP 3 hours daily 5 x week  -Will require follow up with neuro-oncology     #Covid 19  - positive on 2/8- likely false positive  - Negative  2/9 and 2/10    # sore throat and dysphonia  - consider ENT evaluation possible VC pathology    # Hyponatermia  -Continue fluid restriction  -Continue salt tabs 2G Q6h  - Na 134 2/12    #HTN  - lisinopril 5mg daily  - (92/56 - 118/69). f/u hospitalist ?reduce lisinopril 2.5     #HLD   - atorvastatin 20mg daily    #Type 2 DM  - A1c is 10.7 (1/25)  -FS and ISS  -Continue Lantus 15 units qhs  - premeal admelog 8U at breakfast and dinner and 6U at lunch    #Pain control  - Tylenol PRN    #GI/Bowel Mgmt   - Continue Senna at bedtime   - Miralax BID    #DVT  - Lovenox  - SCDs, TEDs     # FEN   - Diet - Easy to Chew+ Thins  [CCHO, DASH/TLC]    - Dysphagia  SLP - evaluation and treatment          Outpatient Follow-up (Specialty/Name of physician):  Parker hook)  Neurosurgery  [address] [place] [address]  Phone: [phone]  Fax: [phone]  Follow Up Time:     Matthew Hill)  Neurology  [facility], [address] [place] [address]  Phone: [phone]  Fax: [phone]  Follow Up Time:     Kristopher Guevara  RADIATION ONCOLOGY  [address] [place] [address] Entrance A [place] [address]  Phone: [phone]  Fax: [phone]

## 2022-02-12 NOTE — PROGRESS NOTE ADULT - SUBJECTIVE AND OBJECTIVE BOX
Patient is a 66y old  Male who presents with a chief complaint of Glioblastoma (2022 15:12)      HPI:  This is a 67 YO male with PMH of  HTN, DM2, and known brain mass. He was in the [place] for vacation in early January, when pt was noted to be confused and not answering appropriately, so an MRI was done on  showed L frontal enhancing mass with significant edema. He presented to [facility] on  for further evaluation and repeat CTH showed significant vasogenic edema and 1.5cm MLS. He underwent 22 for left sided craniotomy for tumor resection with gleolan.  Pt was started on decadron and Vimpat also.  Post op course was complicated by possible seizures with EEG negative. Hyponatermia with salt tabs added to regimen. 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 He was evaluated by PMR and was recommended for acute inpatient rehab. He was admitted to [facility] on 22, however COVID test came back positive. COVID tests from  and 22 were negative. Patient remains asymptomatic.     COVID swab from 2022 and 2/10/2022 are both negative. Per medical team, he does not need isolation at this time. COVID swab from 2022 was likely a false positive. He was seen by ENT for hoarseness - vocal cords mobile but with incomplete closure and Continue with speech and swallow therapy. Follow up with ENT/Laryngology, Dr. Graham Bergeron [phone] 2 weeks after discharge. Patient stable to return to [facility]  on 22.       (2022 15:12)      PAST MEDICAL & SURGICAL HISTORY:  Diabetes mellitus    Hypertension    Glioblastoma  just diagnosed - 22    S/P craniotomy        MEDICATIONS  (STANDING):  atorvastatin 20 milliGRAM(s) Oral at bedtime  dexAMETHasone     Tablet 2 milliGRAM(s) Oral two times a day  dextrose 40% Gel 15 Gram(s) Oral once  dextrose 5%. 1000 milliLiter(s) (50 mL/Hr) IV Continuous <Continuous>  dextrose 5%. 1000 milliLiter(s) (100 mL/Hr) IV Continuous <Continuous>  dextrose 50% Injectable 25 Gram(s) IV Push once  dextrose 50% Injectable 12.5 Gram(s) IV Push once  dextrose 50% Injectable 25 Gram(s) IV Push once  enoxaparin Injectable 40 milliGRAM(s) SubCutaneous daily  glucagon  Injectable 1 milliGRAM(s) IntraMuscular once  insulin glargine Injectable (LANTUS) 16 Unit(s) SubCutaneous at bedtime  insulin lispro (ADMELOG) corrective regimen sliding scale   SubCutaneous three times a day before meals  insulin lispro (ADMELOG) corrective regimen sliding scale   SubCutaneous at bedtime  insulin lispro Injectable (ADMELOG) 5 Unit(s) SubCutaneous three times a day before meals  lacosamide Solution 100 milliGRAM(s) Oral two times a day  pantoprazole    Tablet 40 milliGRAM(s) Oral before breakfast  polyethylene glycol 3350 17 Gram(s) Oral every 12 hours  senna 2 Tablet(s) Oral at bedtime  sodium chloride 2 Gram(s) Oral every 6 hours    MEDICATIONS  (PRN):  acetaminophen     Tablet .. 650 milliGRAM(s) Oral every 6 hours PRN Temp greater or equal to 38C (100.4F), Mild Pain (1 - 3)      Allergies    No Known Allergies    Intolerances          VITALS  66y  Vital Signs Last 24 Hrs  T(C): 36.4 (2022 07:30), Max: 36.9 (2022 15:27)  T(F): 97.5 (2022 07:30), Max: 98.4 (2022 15:27)  HR: 59 (2022 07:30) (59 - 68)  BP: 107/64 (2022 07:30) (92/56 - 118/69)  BP(mean): --  RR: 15 (2022 07:30) (14 - 16)  SpO2: 96% (2022 07:30) (96% - 98%)  Daily Height in cm: 170.18 (2022 16:09)    Daily Weight in k.7 (2022 22:33)        RECENT LABS:                          11.5   7.84  )-----------( 50       ( 2022 05:30 )             34.9     02-12    134<L>  |  100  |  15  ----------------------------<  194<H>  4.5   |  25  |  0.76    Ca    8.6      2022 05:30    TPro  6.3  /  Alb  2.5<L>  /  TBili  0.8  /  DBili  x   /  AST  18  /  ALT  23  /  AlkPhos  65  02-12    LIVER FUNCTIONS - ( 2022 05:30 )  Alb: 2.5 g/dL / Pro: 6.3 g/dL / ALK PHOS: 65 U/L / ALT: 23 U/L / AST: 18 U/L / GGT: x                   CAPILLARY BLOOD GLUCOSE      POCT Blood Glucose.: 166 mg/dL (2022 11:49)  POCT Blood Glucose.: 228 mg/dL (2022 08:17)  POCT Blood Glucose.: 224 mg/dL (2022 21:20)  POCT Blood Glucose.: 104 mg/dL (2022 16:44)

## 2022-02-13 LAB
GLUCOSE BLDC GLUCOMTR-MCNC: 144 MG/DL — HIGH (ref 70–99)
GLUCOSE BLDC GLUCOMTR-MCNC: 147 MG/DL — HIGH (ref 70–99)
GLUCOSE BLDC GLUCOMTR-MCNC: 154 MG/DL — HIGH (ref 70–99)
GLUCOSE BLDC GLUCOMTR-MCNC: 210 MG/DL — HIGH (ref 70–99)

## 2022-02-13 PROCEDURE — 99232 SBSQ HOSP IP/OBS MODERATE 35: CPT

## 2022-02-13 RX ORDER — NYSTATIN 500MM UNIT
500000 POWDER (EA) MISCELLANEOUS THREE TIMES A DAY
Refills: 0 | Status: COMPLETED | OUTPATIENT
Start: 2022-02-13 | End: 2022-02-18

## 2022-02-13 RX ORDER — BENZOCAINE AND MENTHOL 5; 1 G/100ML; G/100ML
1 LIQUID ORAL EVERY 4 HOURS
Refills: 0 | Status: DISCONTINUED | OUTPATIENT
Start: 2022-02-13 | End: 2022-02-24

## 2022-02-13 RX ADMIN — Medication 5 UNIT(S): at 16:39

## 2022-02-13 RX ADMIN — Medication 2 MILLIGRAM(S): at 17:15

## 2022-02-13 RX ADMIN — POLYETHYLENE GLYCOL 3350 17 GRAM(S): 17 POWDER, FOR SOLUTION ORAL at 05:14

## 2022-02-13 RX ADMIN — ENOXAPARIN SODIUM 40 MILLIGRAM(S): 100 INJECTION SUBCUTANEOUS at 11:17

## 2022-02-13 RX ADMIN — LACOSAMIDE 100 MILLIGRAM(S): 50 TABLET ORAL at 17:17

## 2022-02-13 RX ADMIN — PANTOPRAZOLE SODIUM 40 MILLIGRAM(S): 20 TABLET, DELAYED RELEASE ORAL at 05:15

## 2022-02-13 RX ADMIN — Medication 2: at 11:21

## 2022-02-13 RX ADMIN — Medication 500000 UNIT(S): at 13:27

## 2022-02-13 RX ADMIN — LACOSAMIDE 100 MILLIGRAM(S): 50 TABLET ORAL at 05:14

## 2022-02-13 RX ADMIN — Medication 5 UNIT(S): at 07:46

## 2022-02-13 RX ADMIN — Medication 500000 UNIT(S): at 21:39

## 2022-02-13 RX ADMIN — SODIUM CHLORIDE 2 GRAM(S): 9 INJECTION INTRAMUSCULAR; INTRAVENOUS; SUBCUTANEOUS at 17:14

## 2022-02-13 RX ADMIN — Medication 2 MILLIGRAM(S): at 05:14

## 2022-02-13 RX ADMIN — BENZOCAINE AND MENTHOL 1 LOZENGE: 5; 1 LIQUID ORAL at 13:28

## 2022-02-13 RX ADMIN — SODIUM CHLORIDE 2 GRAM(S): 9 INJECTION INTRAMUSCULAR; INTRAVENOUS; SUBCUTANEOUS at 05:14

## 2022-02-13 RX ADMIN — Medication 5 UNIT(S): at 11:20

## 2022-02-13 RX ADMIN — SODIUM CHLORIDE 2 GRAM(S): 9 INJECTION INTRAMUSCULAR; INTRAVENOUS; SUBCUTANEOUS at 23:51

## 2022-02-13 RX ADMIN — INSULIN GLARGINE 16 UNIT(S): 100 INJECTION, SOLUTION SUBCUTANEOUS at 21:39

## 2022-02-13 RX ADMIN — Medication 1: at 07:47

## 2022-02-13 RX ADMIN — ATORVASTATIN CALCIUM 20 MILLIGRAM(S): 80 TABLET, FILM COATED ORAL at 21:38

## 2022-02-13 RX ADMIN — SODIUM CHLORIDE 2 GRAM(S): 9 INJECTION INTRAMUSCULAR; INTRAVENOUS; SUBCUTANEOUS at 11:17

## 2022-02-13 NOTE — PROGRESS NOTE ADULT - ASSESSMENT
This is a 65 YO male with PMH of  HTN, DM2, p/w  L frontal enhancing mass with significant edema s/p left sided craniotomy and tumor resection with gleolan. Post-op course was complicated by possible seizures. 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    #Left frontal tumor s/p craniotomy  - f/u NSGY re: timing staple removal as post 2 weeks  - Vimpat 100mg BID for seizure prophylaxis  - Continue Decadron taper, on 2mg Q12h  - continue Comprehensive Rehab Program of PT/OT/SLP 3 hours daily 5 x week  - follow up with neuro-oncology     #Covid 19  - positive on 2/8- likely false positive  - Negative  2/9 and 2/10    # sore throat and dysphonia  - recommend  ENT evaluation possible VC pathology  - oral thrush: start nystatin TID 2/13  - cepacol lozenge    # Hyponatermia  -Continue fluid restriction  -Continue salt tabs 2G Q6h  - Na 134 2/12  - BMP 2./14    #HTN  - lisinopril dc'd  -  (105/51 - 109/66) 2/13 BP improved    #HLD   - atorvastatin 20mg daily    #Type 2 DM  - A1c is 10.7 (1/25)  - FS and ISS  -Continue Lantus 15 units qhs  - premeal admelog 8U at breakfast and dinner and 6U at lunch  - -221 2/13 not optimally controlled. hospitalist f/u    #Pain control  - Tylenol PRN    #GI/Bowel Mgmt   - Continue Senna qhs  - Miralax BID    # FEN   - Diet - Easy to Chew+ Thins  [CCHO, DASH/TLC]      #DVT PPX  - Lovenox  - SCDs, TEDs       Outpatient Follow-up (Specialty/Name of physician):  Parker hook)  Neurosurgery  [address] [place] [address]  Phone: [phone]  Fax: [phone]  Follow Up Time:     Matthew Hill)  Neurology  [facility], [address] [place] [address]  Phone: [phone]  Fax: [phone]  Follow Up Time:     Kristopher Guevara  RADIATION ONCOLOGY  [address] [place] [address] Entrance A [place] [address]  Phone: [phone]  Fax: [phone]

## 2022-02-13 NOTE — PROGRESS NOTE ADULT - SUBJECTIVE AND OBJECTIVE BOX
Hospitalist: Tere Cabrera DO    CHIEF COMPLAINT: Patient is a 66y old  male who presents with a chief complaint of Glioblastoma (13 Feb 2022 10:29)    SUBJECTIVE / OVERNIGHT EVENTS: Patient seen and examined. No acute events overnight. Pain well controlled and patient without any complaints.    MEDICATIONS  (STANDING):  atorvastatin 20 milliGRAM(s) Oral at bedtime  dexAMETHasone     Tablet 2 milliGRAM(s) Oral two times a day  dextrose 40% Gel 15 Gram(s) Oral once  dextrose 5%. 1000 milliLiter(s) (50 mL/Hr) IV Continuous <Continuous>  dextrose 5%. 1000 milliLiter(s) (100 mL/Hr) IV Continuous <Continuous>  dextrose 50% Injectable 25 Gram(s) IV Push once  dextrose 50% Injectable 12.5 Gram(s) IV Push once  dextrose 50% Injectable 25 Gram(s) IV Push once  enoxaparin Injectable 40 milliGRAM(s) SubCutaneous daily  glucagon  Injectable 1 milliGRAM(s) IntraMuscular once  insulin glargine Injectable (LANTUS) 16 Unit(s) SubCutaneous at bedtime  insulin lispro (ADMELOG) corrective regimen sliding scale   SubCutaneous three times a day before meals  insulin lispro (ADMELOG) corrective regimen sliding scale   SubCutaneous at bedtime  insulin lispro Injectable (ADMELOG) 5 Unit(s) SubCutaneous three times a day before meals  lacosamide Solution 100 milliGRAM(s) Oral two times a day  nystatin    Suspension 187701 Unit(s) Oral three times a day  pantoprazole    Tablet 40 milliGRAM(s) Oral before breakfast  polyethylene glycol 3350 17 Gram(s) Oral every 12 hours  senna 2 Tablet(s) Oral at bedtime  sodium chloride 2 Gram(s) Oral every 6 hours    MEDICATIONS  (PRN):  acetaminophen     Tablet .. 650 milliGRAM(s) Oral every 6 hours PRN Temp greater or equal to 38C (100.4F), Mild Pain (1 - 3)  benzocaine 15 mG/menthol 3.6 mG Lozenge 1 Lozenge Oral every 4 hours PRN Sore Throat      VITALS:  T(F): 97.6 (02-13-22 @ 08:19), Max: 98.1 (02-12-22 @ 20:16)  HR: 58 (02-13-22 @ 08:19) (58 - 62)  BP: 105/51 (02-13-22 @ 08:19) (105/51 - 109/66)  RR: 15 (02-13-22 @ 08:19) (14 - 15)  SpO2: 95% (02-13-22 @ 08:19)      REVIEW OF SYSTEMS:  For ROV please refer back to H&P     PHYSICAL EXAM:     Constitutional Normal: well nourished, well developed, non-dysmorphic, no acute distress     Psychiatric: age appropriate behavior, cooperative     Skin: no obvious skin lesions     Lymphatic: no cervical lymphadenopathy     Eyes: EOM Intact, non-icteric    LABS:              11.5                 134  | 25   | 15           7.84  >-----------< 50      ------------------------< 194                   34.9                 4.5  | 100  | 0.76                                         Ca 8.6   Mg x     Ph x           TPro  6.3  /  Alb  2.5      TBili  0.8  /  DBili  x         AST  18  /  ALT  23            AlkPhos  65       CAPILLARY BLOOD GLUCOSE      POCT Blood Glucose.: 210 mg/dL (13 Feb 2022 11:16)  POCT Blood Glucose.: 154 mg/dL (13 Feb 2022 07:45)  POCT Blood Glucose.: 221 mg/dL (12 Feb 2022 20:20)  POCT Blood Glucose.: 181 mg/dL (12 Feb 2022 16:39)        MICROBIOLOGY:          RADIOLOGY & ADDITIONAL TESTS:    Imaging Personally Reviewed:    [X] Consultant(s) Notes Reviewed:  [X] Care Discussed with Consultants/Other Providers:

## 2022-02-13 NOTE — PROGRESS NOTE ADULT - SUBJECTIVE AND OBJECTIVE BOX
Patient is a 66y old  Male who presents with a chief complaint of Glioblastoma (2022 16:09)      HPI:  This is a 67 YO male with PMH of  HTN, DM2, and known brain mass. He was in the [place] for vacation in early January, when pt was noted to be confused and not answering appropriately, so an MRI was done on  showed L frontal enhancing mass with significant edema. He presented to [facility] on  for further evaluation and repeat CTH showed significant vasogenic edema and 1.5cm MLS. He underwent 22 for left sided craniotomy for tumor resection with gleolan.  Pt was started on decadron and Vimpat also.  Post op course was complicated by possible seizures with EEG negative. Hyponatermia with salt tabs added to regimen. 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 He was evaluated by PMR and was recommended for acute inpatient rehab. He was admitted to [facility] on 22, however COVID test came back positive. COVID tests from  and 22 were negative. Patient remains asymptomatic.     COVID swab from 2022 and 2/10/2022 are both negative. Per medical team, he does not need isolation at this time. COVID swab from 2022 was likely a false positive. He was seen by ENT for hoarseness - vocal cords mobile but with incomplete closure and Continue with speech and swallow therapy. Follow up with ENT/Laryngology, Dr. Graham Bergeron [phone] 2 weeks after discharge. Patient stable to return to [facility] IR[facility]  on 22.       (2022 15:12)      PAST MEDICAL & SURGICAL HISTORY:  Diabetes mellitus    Hypertension    Glioblastoma  just diagnosed - 22    S/P craniotomy        MEDICATIONS  (STANDING):  atorvastatin 20 milliGRAM(s) Oral at bedtime  dexAMETHasone     Tablet 2 milliGRAM(s) Oral two times a day  dextrose 40% Gel 15 Gram(s) Oral once  dextrose 5%. 1000 milliLiter(s) (50 mL/Hr) IV Continuous <Continuous>  dextrose 5%. 1000 milliLiter(s) (100 mL/Hr) IV Continuous <Continuous>  dextrose 50% Injectable 25 Gram(s) IV Push once  dextrose 50% Injectable 12.5 Gram(s) IV Push once  dextrose 50% Injectable 25 Gram(s) IV Push once  enoxaparin Injectable 40 milliGRAM(s) SubCutaneous daily  glucagon  Injectable 1 milliGRAM(s) IntraMuscular once  insulin glargine Injectable (LANTUS) 16 Unit(s) SubCutaneous at bedtime  insulin lispro (ADMELOG) corrective regimen sliding scale   SubCutaneous three times a day before meals  insulin lispro (ADMELOG) corrective regimen sliding scale   SubCutaneous at bedtime  insulin lispro Injectable (ADMELOG) 5 Unit(s) SubCutaneous three times a day before meals  lacosamide Solution 100 milliGRAM(s) Oral two times a day  pantoprazole    Tablet 40 milliGRAM(s) Oral before breakfast  polyethylene glycol 3350 17 Gram(s) Oral every 12 hours  senna 2 Tablet(s) Oral at bedtime  sodium chloride 2 Gram(s) Oral every 6 hours    MEDICATIONS  (PRN):  acetaminophen     Tablet .. 650 milliGRAM(s) Oral every 6 hours PRN Temp greater or equal to 38C (100.4F), Mild Pain (1 - 3)      Allergies    No Known Allergies    Intolerances          VITALS  66y  Vital Signs Last 24 Hrs  T(C): 36.4 (2022 08:19), Max: 36.7 (2022 20:16)  T(F): 97.6 (2022 08:19), Max: 98.1 (2022 20:16)  HR: 58 (2022 08:19) (58 - 62)  BP: 105/51 (2022 08:19) (105/51 - 109/66)  BP(mean): --  RR: 15 (2022 08:19) (14 - 15)  SpO2: 95% (2022 08:19) (95% - 97%)  Daily     Daily Weight in k.4 (2022 01:54)        RECENT LABS:                          11.5   7.84  )-----------( 50       ( 2022 05:30 )             34.9     02-12    134<L>  |  100  |  15  ----------------------------<  194<H>  4.5   |  25  |  0.76    Ca    8.6      2022 05:30    TPro  6.3  /  Alb  2.5<L>  /  TBili  0.8  /  DBili  x   /  AST  18  /  ALT  23  /  AlkPhos  65  02-12    LIVER FUNCTIONS - ( 2022 05:30 )  Alb: 2.5 g/dL / Pro: 6.3 g/dL / ALK PHOS: 65 U/L / ALT: 23 U/L / AST: 18 U/L / GGT: x                   CAPILLARY BLOOD GLUCOSE      POCT Blood Glucose.: 154 mg/dL (2022 07:45)  POCT Blood Glucose.: 221 mg/dL (2022 20:20)  POCT Blood Glucose.: 181 mg/dL (2022 16:39)  POCT Blood Glucose.: 166 mg/dL (2022 11:49)        Review of Systems:   · Additional ROS	Patient complains of persistent sore throat had difficulty sleeping at night due to it. Discussed ENT consult tomorrow. No fever    Physical Exam:   · Constitutional	detailed exam  · Constitutional Comments	alert, bifrontal craniotomy incison healing well, C/D/I no swelling  O x 3 grossly follows simple commands consistnetly  · HEENT	no injection in throat _+oral thrush  sl dry mucosa  ·	  · Respiratory	detailed exam  · Respiratory Details	respirations non-labored; clear to auscultation bilaterally  · Respiratory Comments	good- effort  · Cardiovascular	detailed exam  · Cardiovascular Details	regular rate and rhythm  · Gastrointestinal	detailed exam  · GI Normal	soft; nontender; bowel sounds normal; no rebound tenderness  · Extremities	detailed exam  · Extremities Comments	calves soft, NT no erythema or warmth bilaterally

## 2022-02-13 NOTE — PROGRESS NOTE ADULT - ASSESSMENT
65 y/o man with PMH of  HTN, DM2, and recent discovery of brain Glioblastoma s/p 1/27/22 for left sided craniotomy. Post-op course was complicated by possible hyponatermia with salt tabs added to regimen. 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 He was admitted to [facility] for functional and gair impairment     Rehab: Functional and gait instability:  - C/w PT/OT per primary team     CBS: Left frontal GBM s/p craniotomy for resection with right sided drift  -Continue Vimpat 100mg BID for seizure prophylaxis  -Continue Decadron slow taper- now on 2mg Q12h - to maintain until follow up with neurosurgery   -Will require follow up with neuro-oncology     Hyponatermia  -Continue fluid restriction  -Continue salt tabs 2G Q6h    HTN  -lisinopril has been held due to soft BP     HLD   -Continue atorvastatin 20mg daily    Type 2 DM  - A1c is 10.7 (1/25)  -FS and ISS  -Continue Lantus 16 units at bedtime  - continue premeal admelog 5U at breakfast and dinner and 6U at lunch    DVT PPX  - Lovenox  - SCDs  - TEDs

## 2022-02-14 LAB
ALBUMIN SERPL ELPH-MCNC: 2.6 G/DL — LOW (ref 3.3–5)
ALP SERPL-CCNC: 67 U/L — SIGNIFICANT CHANGE UP (ref 40–120)
ALT FLD-CCNC: 23 U/L — SIGNIFICANT CHANGE UP (ref 10–45)
ANION GAP SERPL CALC-SCNC: 6 MMOL/L — SIGNIFICANT CHANGE UP (ref 5–17)
AST SERPL-CCNC: 11 U/L — SIGNIFICANT CHANGE UP (ref 10–40)
BILIRUB SERPL-MCNC: 0.8 MG/DL — SIGNIFICANT CHANGE UP (ref 0.2–1.2)
BUN SERPL-MCNC: 9 MG/DL — SIGNIFICANT CHANGE UP (ref 7–23)
CALCIUM SERPL-MCNC: 8.8 MG/DL — SIGNIFICANT CHANGE UP (ref 8.4–10.5)
CHLORIDE SERPL-SCNC: 99 MMOL/L — SIGNIFICANT CHANGE UP (ref 96–108)
CO2 SERPL-SCNC: 31 MMOL/L — SIGNIFICANT CHANGE UP (ref 22–31)
CREAT SERPL-MCNC: 0.87 MG/DL — SIGNIFICANT CHANGE UP (ref 0.5–1.3)
CULTURE RESULTS: SIGNIFICANT CHANGE UP
GLUCOSE BLDC GLUCOMTR-MCNC: 113 MG/DL — HIGH (ref 70–99)
GLUCOSE BLDC GLUCOMTR-MCNC: 190 MG/DL — HIGH (ref 70–99)
GLUCOSE BLDC GLUCOMTR-MCNC: 228 MG/DL — HIGH (ref 70–99)
GLUCOSE BLDC GLUCOMTR-MCNC: 307 MG/DL — HIGH (ref 70–99)
GLUCOSE SERPL-MCNC: 114 MG/DL — HIGH (ref 70–99)
HCT VFR BLD CALC: 36.3 % — LOW (ref 39–50)
HGB BLD-MCNC: 11.9 G/DL — LOW (ref 13–17)
MCHC RBC-ENTMCNC: 31.2 PG — SIGNIFICANT CHANGE UP (ref 27–34)
MCHC RBC-ENTMCNC: 32.8 GM/DL — SIGNIFICANT CHANGE UP (ref 32–36)
MCV RBC AUTO: 95 FL — SIGNIFICANT CHANGE UP (ref 80–100)
NRBC # BLD: 0 /100 WBCS — SIGNIFICANT CHANGE UP (ref 0–0)
PLATELET # BLD AUTO: 144 K/UL — LOW (ref 150–400)
POTASSIUM SERPL-MCNC: 4.5 MMOL/L — SIGNIFICANT CHANGE UP (ref 3.5–5.3)
POTASSIUM SERPL-SCNC: 4.5 MMOL/L — SIGNIFICANT CHANGE UP (ref 3.5–5.3)
PROT SERPL-MCNC: 6.4 G/DL — SIGNIFICANT CHANGE UP (ref 6–8.3)
RBC # BLD: 3.82 M/UL — LOW (ref 4.2–5.8)
RBC # FLD: 13.1 % — SIGNIFICANT CHANGE UP (ref 10.3–14.5)
SODIUM SERPL-SCNC: 136 MMOL/L — SIGNIFICANT CHANGE UP (ref 135–145)
SPECIMEN SOURCE: SIGNIFICANT CHANGE UP
WBC # BLD: 8.9 K/UL — SIGNIFICANT CHANGE UP (ref 3.8–10.5)
WBC # FLD AUTO: 8.9 K/UL — SIGNIFICANT CHANGE UP (ref 3.8–10.5)

## 2022-02-14 PROCEDURE — 99233 SBSQ HOSP IP/OBS HIGH 50: CPT

## 2022-02-14 PROCEDURE — 99232 SBSQ HOSP IP/OBS MODERATE 35: CPT

## 2022-02-14 RX ORDER — SODIUM CHLORIDE 9 MG/ML
1 INJECTION INTRAMUSCULAR; INTRAVENOUS; SUBCUTANEOUS EVERY 6 HOURS
Refills: 0 | Status: DISCONTINUED | OUTPATIENT
Start: 2022-02-14 | End: 2022-02-21

## 2022-02-14 RX ORDER — FLUTICASONE PROPIONATE 50 MCG
1 SPRAY, SUSPENSION NASAL
Refills: 0 | Status: DISCONTINUED | OUTPATIENT
Start: 2022-02-14 | End: 2022-02-24

## 2022-02-14 RX ADMIN — Medication 650 MILLIGRAM(S): at 21:27

## 2022-02-14 RX ADMIN — Medication 500000 UNIT(S): at 14:05

## 2022-02-14 RX ADMIN — Medication 2: at 17:12

## 2022-02-14 RX ADMIN — BENZOCAINE AND MENTHOL 1 LOZENGE: 5; 1 LIQUID ORAL at 17:59

## 2022-02-14 RX ADMIN — INSULIN GLARGINE 16 UNIT(S): 100 INJECTION, SOLUTION SUBCUTANEOUS at 21:21

## 2022-02-14 RX ADMIN — Medication 5 UNIT(S): at 07:49

## 2022-02-14 RX ADMIN — Medication 2 MILLIGRAM(S): at 05:50

## 2022-02-14 RX ADMIN — Medication 1 SPRAY(S): at 17:48

## 2022-02-14 RX ADMIN — Medication 2 MILLIGRAM(S): at 17:47

## 2022-02-14 RX ADMIN — ENOXAPARIN SODIUM 40 MILLIGRAM(S): 100 INJECTION SUBCUTANEOUS at 11:01

## 2022-02-14 RX ADMIN — SENNA PLUS 2 TABLET(S): 8.6 TABLET ORAL at 21:21

## 2022-02-14 RX ADMIN — SODIUM CHLORIDE 1 GRAM(S): 9 INJECTION INTRAMUSCULAR; INTRAVENOUS; SUBCUTANEOUS at 17:47

## 2022-02-14 RX ADMIN — ATORVASTATIN CALCIUM 20 MILLIGRAM(S): 80 TABLET, FILM COATED ORAL at 21:20

## 2022-02-14 RX ADMIN — SODIUM CHLORIDE 2 GRAM(S): 9 INJECTION INTRAMUSCULAR; INTRAVENOUS; SUBCUTANEOUS at 11:01

## 2022-02-14 RX ADMIN — PANTOPRAZOLE SODIUM 40 MILLIGRAM(S): 20 TABLET, DELAYED RELEASE ORAL at 05:54

## 2022-02-14 RX ADMIN — SODIUM CHLORIDE 2 GRAM(S): 9 INJECTION INTRAMUSCULAR; INTRAVENOUS; SUBCUTANEOUS at 05:50

## 2022-02-14 RX ADMIN — Medication 500000 UNIT(S): at 05:59

## 2022-02-14 RX ADMIN — Medication 4: at 10:59

## 2022-02-14 RX ADMIN — Medication 5 UNIT(S): at 10:59

## 2022-02-14 RX ADMIN — Medication 5 UNIT(S): at 17:11

## 2022-02-14 RX ADMIN — LACOSAMIDE 100 MILLIGRAM(S): 50 TABLET ORAL at 17:48

## 2022-02-14 RX ADMIN — LACOSAMIDE 100 MILLIGRAM(S): 50 TABLET ORAL at 05:50

## 2022-02-14 RX ADMIN — Medication 500000 UNIT(S): at 21:20

## 2022-02-14 RX ADMIN — Medication 650 MILLIGRAM(S): at 22:12

## 2022-02-14 NOTE — PROGRESS NOTE ADULT - SUBJECTIVE AND OBJECTIVE BOX
Medicine Progress Note    Patient is a 66y old  Male who presents with a chief complaint of Glioblastoma (14 Feb 2022 13:56)      SUBJECTIVE / OVERNIGHT EVENTS:  seen and examined  Chart reviewed  No overnight events  Limb weakness improving with therapy  BM+  sore throat. admit post-nasal drip. no other symptoms    ADDITIONAL REVIEW OF SYSTEMS:  no fever/chills/CP/sob/palpitation/dizziness/ abd pain/nausea/vomiting/diarrhea/constipation/headaches. all other ROS neg    MEDICATIONS  (STANDING):  atorvastatin 20 milliGRAM(s) Oral at bedtime  dexAMETHasone     Tablet 2 milliGRAM(s) Oral two times a day  dextrose 40% Gel 15 Gram(s) Oral once  dextrose 5%. 1000 milliLiter(s) (50 mL/Hr) IV Continuous <Continuous>  dextrose 5%. 1000 milliLiter(s) (100 mL/Hr) IV Continuous <Continuous>  dextrose 50% Injectable 25 Gram(s) IV Push once  dextrose 50% Injectable 12.5 Gram(s) IV Push once  dextrose 50% Injectable 25 Gram(s) IV Push once  enoxaparin Injectable 40 milliGRAM(s) SubCutaneous daily  fluticasone propionate 50 MICROgram(s)/spray Nasal Spray 1 Spray(s) Both Nostrils two times a day  glucagon  Injectable 1 milliGRAM(s) IntraMuscular once  insulin glargine Injectable (LANTUS) 16 Unit(s) SubCutaneous at bedtime  insulin lispro (ADMELOG) corrective regimen sliding scale   SubCutaneous three times a day before meals  insulin lispro (ADMELOG) corrective regimen sliding scale   SubCutaneous at bedtime  insulin lispro Injectable (ADMELOG) 5 Unit(s) SubCutaneous three times a day before meals  lacosamide Solution 100 milliGRAM(s) Oral two times a day  nystatin    Suspension 995388 Unit(s) Oral three times a day  pantoprazole    Tablet 40 milliGRAM(s) Oral before breakfast  polyethylene glycol 3350 17 Gram(s) Oral every 12 hours  senna 2 Tablet(s) Oral at bedtime  sodium chloride 1 Gram(s) Oral every 6 hours    MEDICATIONS  (PRN):  acetaminophen     Tablet .. 650 milliGRAM(s) Oral every 6 hours PRN Temp greater or equal to 38C (100.4F), Mild Pain (1 - 3)  benzocaine 15 mG/menthol 3.6 mG Lozenge 1 Lozenge Oral every 4 hours PRN Sore Throat    CAPILLARY BLOOD GLUCOSE      POCT Blood Glucose.: 307 mg/dL (14 Feb 2022 10:57)  POCT Blood Glucose.: 113 mg/dL (14 Feb 2022 07:47)  POCT Blood Glucose.: 144 mg/dL (13 Feb 2022 21:37)  POCT Blood Glucose.: 147 mg/dL (13 Feb 2022 16:38)    I&O's Summary      PHYSICAL EXAM:  Vital Signs Last 24 Hrs  T(C): 36.6 (14 Feb 2022 07:48), Max: 36.8 (13 Feb 2022 20:26)  T(F): 97.9 (14 Feb 2022 07:48), Max: 98.2 (13 Feb 2022 20:26)  HR: 68 (14 Feb 2022 07:48) (62 - 68)  BP: 105/73 (14 Feb 2022 07:48) (102/62 - 110/71)  BP(mean): --  RR: 16 (14 Feb 2022 07:48) (16 - 16)  SpO2: 97% (14 Feb 2022 07:48) (95% - 97%)    GENERAL: Not in distress. Alert    HEENT: clear conjuctiva, MMM. no pallor or icterus. clear nasal discharge with dripping through  post-pharynx. throat clear. no sinus TP  CARDIOVASCULAR: RRR S1, S2. No murmur/rubs/gallop  LUNGS: BLAE+, no rales, no wheezing, no rhonchi.    ABDOMEN: ND. Soft,  NT, no guarding / rebound / rigidity. BS normoactive  BACK: No spine tenderness.  EXTREMITIES: no edema. no leg or calf TP.  SKIN: warm and dry  PSYCHIATRIC: Calm.  No agitation.    LABS:                        11.9   8.90  )-----------( 144      ( 14 Feb 2022 06:00 )             36.3     02-14    136  |  99  |  9   ----------------------------<  114<H>  4.5   |  31  |  0.87    Ca    8.8      14 Feb 2022 06:00    TPro  6.4  /  Alb  2.6<L>  /  TBili  0.8  /  DBili  x   /  AST  11  /  ALT  23  /  AlkPhos  67  02-14              Culture - Group A Streptococcus (collected 12 Feb 2022 16:57)  Source: .Throat Throat  Final Report (14 Feb 2022 07:31):    No Streptococcus pyogenes (Group A) isolated      COVID-19 PCR: NotDetec (11 Feb 2022 22:10)  COVID-19 PCR: NotDetec (10 Feb 2022 11:57)  COVID-19 PCR: NotDetec (09 Feb 2022 10:20)  COVID-19 PCR: Detected (08 Feb 2022 22:50)  COVID-19 PCR: NotDetec (06 Feb 2022 13:16)  COVID-19 PCR: NotDetec (04 Feb 2022 12:20)  COVID-19 PCR: NotDetec (26 Jan 2022 22:36)  COVID-19 PCR: NotDetec (24 Jan 2022 19:40)      RADIOLOGY & ADDITIONAL TESTS:  Imaging from Last 24 Hours:    Electrocardiogram/QTc Interval:    COORDINATION OF CARE:  Care Discussed with Consultants/Other Providers:

## 2022-02-14 NOTE — PROGRESS NOTE ADULT - SUBJECTIVE AND OBJECTIVE BOX
CHIEF COMPLAINT: no acute events overnight, denies HA, no Sz, tolerates therapy well       HISTORY OF PRESENT ILLNESS    This is a 65 YO male with PMH of  HTN, DM2, and known brain mass. He was in the [place] for vacation in early January, when pt was noted to be confused and not answering appropriately, so an MRI was done on Jan 7 showed L frontal enhancing mass with significant edema. He presented to [facility] on 1/24 for further evaluation and repeat CTH showed significant vasogenic edema and 1.5cm MLS. He underwent 1/27/22 for left sided craniotomy for tumor resection with gleolan.  Pt was started on decadron and Vimpat also.  Post op course was complicated by possible seizures with EEG negative. Hyponatermia with salt tabs added to regimen. 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 He was evaluated by PMR and was recommended for acute inpatient rehab. He was admitted to [facility] on 02/08/22, however COVID test came back positive. COVID tests from 02/04 and 02/06/22 were negative. Patient remains asymptomatic.     COVID swab from 2/9/2022 and 2/10/2022 are both negative. Per medical team, he does not need isolation at this time. COVID swab from 2/8/2022 was likely a false positive. He was seen by ENT for hoarseness - vocal cords mobile but with incomplete closure and Continue with speech and swallow therapy. Follow up with ENT/Laryngology, Dr. Graham Bergeron [phone] 2 weeks after discharge. Patient stable to return to [facility] IR[facility]  on 2/11/22.       (11 Feb 2022 15:12)           REVIEW OF SYMPTOMS  [X] Constitutional WNL     [X] Cardio WNL            [X] Resp WNL           [X] GI WNL                          [X]  WNL                   [X] Heme WNL              [X] Endo WNL                     [X] Skin WNL                 [X] MSK WNL            [X] Neuro WNL                   [X] Cognitive WNL        [X] Psych WNL      VITALS  Vital Signs Last 24 Hrs  T(C): 36.6 (14 Feb 2022 07:48), Max: 36.8 (13 Feb 2022 20:26)  T(F): 97.9 (14 Feb 2022 07:48), Max: 98.2 (13 Feb 2022 20:26)  HR: 68 (14 Feb 2022 07:48) (62 - 68)  BP: 105/73 (14 Feb 2022 07:48) (102/62 - 110/71)  BP(mean): --  RR: 16 (14 Feb 2022 07:48) (16 - 16)  SpO2: 97% (14 Feb 2022 07:48) (95% - 97%)      PHYSICAL EXAM  Constitutional - NAD, Comfortable  HEENT - NCAT, EOMI  Neck - Supple, No limited ROM  Chest - CTA bilaterally  Cardiovascular - RRR, S1S2, No murmurs  Abdomen -Soft, NTND  Extremities - No C/C/E, No calf tenderness  Neurologic Exam -   no new focal deficits                   RECENT LABS                        11.9   8.90  )-----------( 144      ( 14 Feb 2022 06:00 )             36.3     02-14    136  |  99  |  9   ----------------------------<  114<H>  4.5   |  31  |  0.87    Ca    8.8      14 Feb 2022 06:00    TPro  6.4  /  Alb  2.6<L>  /  TBili  0.8  /  DBili  x   /  AST  11  /  ALT  23  /  AlkPhos  67  02-14      LIVER FUNCTIONS - ( 14 Feb 2022 06:00 )  Alb: 2.6 g/dL / Pro: 6.4 g/dL / ALK PHOS: 67 U/L / ALT: 23 U/L / AST: 11 U/L / GGT: x                         Culture - Group A Streptococcus (collected 12 Feb 2022 16:57)  Source: .Throat Throat  Final Report (14 Feb 2022 07:31):    No Streptococcus pyogenes (Group A) isolated    CURRENT MEDICATIONS  MEDICATIONS  (STANDING):  atorvastatin 20 milliGRAM(s) Oral at bedtime  dexAMETHasone     Tablet 2 milliGRAM(s) Oral two times a day  dextrose 40% Gel 15 Gram(s) Oral once  dextrose 5%. 1000 milliLiter(s) (50 mL/Hr) IV Continuous <Continuous>  dextrose 5%. 1000 milliLiter(s) (100 mL/Hr) IV Continuous <Continuous>  dextrose 50% Injectable 25 Gram(s) IV Push once  dextrose 50% Injectable 12.5 Gram(s) IV Push once  dextrose 50% Injectable 25 Gram(s) IV Push once  enoxaparin Injectable 40 milliGRAM(s) SubCutaneous daily  glucagon  Injectable 1 milliGRAM(s) IntraMuscular once  insulin glargine Injectable (LANTUS) 16 Unit(s) SubCutaneous at bedtime  insulin lispro (ADMELOG) corrective regimen sliding scale   SubCutaneous three times a day before meals  insulin lispro (ADMELOG) corrective regimen sliding scale   SubCutaneous at bedtime  insulin lispro Injectable (ADMELOG) 5 Unit(s) SubCutaneous three times a day before meals  lacosamide Solution 100 milliGRAM(s) Oral two times a day  nystatin    Suspension 531514 Unit(s) Oral three times a day  pantoprazole    Tablet 40 milliGRAM(s) Oral before breakfast  polyethylene glycol 3350 17 Gram(s) Oral every 12 hours  senna 2 Tablet(s) Oral at bedtime  sodium chloride 2 Gram(s) Oral every 6 hours    MEDICATIONS  (PRN):  acetaminophen     Tablet .. 650 milliGRAM(s) Oral every 6 hours PRN Temp greater or equal to 38C (100.4F), Mild Pain (1 - 3)  benzocaine 15 mG/menthol 3.6 mG Lozenge 1 Lozenge Oral every 4 hours PRN Sore Throat

## 2022-02-14 NOTE — PROGRESS NOTE ADULT - ASSESSMENT
67 y/o man with PMH of  HTN, DM2, and recent discovery of brain Glioblastoma s/p 1/27/22 for left sided craniotomy. Post-op course was complicated by possible hyponatermia with salt tabs added to regimen. 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 He was admitted to [facility] for functional and gair impairment      Functional and gait instability:  - C/w PT/OT per primary team      Left frontal GBM s/p craniotomy for resection with right sided drift  -Continue Vimpat 100mg BID for seizure prophylaxis  -Continue Decadron slow taper- now on 2mg Q12h - to maintain until follow up with neurosurgery   -Will require follow up with neuro-oncology     Hyponatremia  -Continue fluid restriction  -Continue salt tabs 2G Q6h. wean off as able  - sodium normalized. can DC fluid restriction 2/14    HTN  -lisinopril has been held due to soft BP     HLD   -Continue atorvastatin 20mg daily    Type 2 DM  - A1c is 10.7 (1/25)  -FS and ISS  -Continue Lantus 16 units at bedtime  - continue premeal admelog 5U at breakfast and dinner and 6U at lunch    Sore throat: likely due to PND  - cepacol  - flonase  - monitor temp/cbc    DVT PPX  - Lovenox  - SCDs  - TEDs       d/w Dr. Parker

## 2022-02-14 NOTE — PROGRESS NOTE ADULT - ASSESSMENT
ASSESSMENT/PLAN  66 year old male with PMH of  HTN, DM2, and known brain mass. He was in the [place] for vacation in early January, when he was noted to be confused and not answering appropriately, so an MRI was done on Jan 7 showed L frontal enhancing mass with significant edema. He presented to [facility] on 1/24 for further evaluation and repeat CTH which  showed significant vasogenic edema and 1.5cm MLS. He underwent 1/27/22 for left sided craniotomy for tumor resection with gleolan. Post-op course was complicated by possible seizures with EEG negative, hyponatermia with salt tabs added to regimen. 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 He was admitted to [facility] but tested positive for covid. He was asymptomatic but trasnferred to medical floor for quarantine. His subsequent covid swabs came back negative. Patient was deemed stable to return to [facility] IR[facility]  on 2/11/22. Patient  with gait Instability, ADL impairments and Functional impairments.    #Left frontal tumor s/p craniotomy for resection with right sided drift, Gait Instability, ADL impairments and Functional impairments  - Continue Comprehensive Rehab Program of PT/OT/SLP  - Monitor incision -staples removed 2/14 - tolerated well  -Continue Vimpat 100mg BID for seizure prophylaxis  -Continue Decadron slow taper- now on 2mg Q12h - to maintain until follow up with neurosurgery   -Will require follow up with neuro-oncology     #Hyponatremia  -Continue salt tabs 2G Q6h  - monitor NA  - Na 136 (2/14)    #HTN  -Home med: lisinopril 5mg daily  -BP controlled     #HLD   -Continue atorvastatin 20mg daily    #Type 2 DM  - A1c is 10.7 (1/25)  -FS and ISS  -Continue Lantus 16 units at bedtime  - continue premeal admelog 5U before meals    #Pain control  - Tylenol PRN    #Covid 19 - false positive   - positive on 2/8- likely false positive  - Negative  2/9 and 2/10  -Continues to be asymptomatic    #GI/Bowel Mgmt   - Continue Senna at bedtime   - Miralax BID    #Bladder management  - Continue to monitor PVR q 8 hours (SC if > 400)  -Monitor UO    #DVT  - Lovenox  - SCDs  - TEDs     #Skin:  -Incisional wound care per nursing. Surgical staples removed, wound intact    FEN   - Diet - Puree+ mild thick [CCHO, DASH/TLC]    - Dysphagia  SLP - evaluation and treatment    #Precaution  - Fall, Aspiration, cardiac      #Dysphagia    - SLP: evaluation and treatment    #Mood/Cognition:  - Neuropsychology consult PRN    Outpatient Follow-up (Specialty/Name of physician):  Parker hook)  Neurosurgery  [address] [place] [address]  Phone: [phone]  Fax: [phone]  Follow Up Time:     Matthew Hill)  Neurology  [facility], [address] [place] [address]  Phone: [phone]  Fax: [phone]  Follow Up Time:     Kristopher Guevara  RADIATION ONCOLOGY  [address] [place] [address] A - [place] [address]  Phone: [phone]  Fax: [phone]

## 2022-02-14 NOTE — PROGRESS NOTE ADULT - SUBJECTIVE AND OBJECTIVE BOX
CHIEF COMPLAINT: He has no new complaints today. Staples removed from surgical site.     Patient is a 66y old  Male who presents with a chief complaint of Glioblastoma (2022 13:01)    HPI:  This is a 65 YO male with PMH of  HTN, DM2, and known brain mass. He was in the [place] for vacation in early January, when pt was noted to be confused and not answering appropriately, so an MRI was done on  showed L frontal enhancing mass with significant edema. He presented to [facility] on  for further evaluation and repeat CTH showed significant vasogenic edema and 1.5cm MLS. He underwent 22 for left sided craniotomy for tumor resection with gleolan.  Pt was started on decadron and Vimpat also.  Post op course was complicated by possible seizures with EEG negative. Hyponatermia with salt tabs added to regimen. 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 He was evaluated by PMR and was recommended for acute inpatient rehab. He was admitted to [facility] on 22, however COVID test came back positive. COVID tests from  and 22 were negative. Patient remains asymptomatic.     COVID swab from 2022 and 2/10/2022 are both negative. Per medical team, he does not need isolation at this time. COVID swab from 2022 was likely a false positive. He was seen by ENT for hoarseness - vocal cords mobile but with incomplete closure and Continue with speech and swallow therapy. Follow up with ENT/Laryngology, Dr. Graham Bergeron [phone] 2 weeks after discharge. Patient stable to return to [facility]  on 22.       (2022 15:12)      PAST MEDICAL & SURGICAL HISTORY:  Diabetes mellitus    Hypertension    Glioblastoma  just diagnosed - 22    S/P craniotomy        Allergies    No Known Allergies    Intolerances          PHYSICAL EXAM    Vital Signs Last 24 Hrs  T(C): 36.6 (2022 07:48), Max: 36.8 (2022 20:26)  T(F): 97.9 (2022 07:48), Max: 98.2 (2022 20:26)  HR: 68 (2022 07:48) (62 - 68)  BP: 105/73 (2022 07:48) (102/62 - 110/71)  BP(mean): --  RR: 16 (2022 07:48) (16 - 16)  SpO2: 97% (2022 07:48) (95% - 97%)  Daily     Daily Weight in k.4 (2022 01:45)      PHYSICAL EXAM  Constitutional - NAD, Comfortable  HEENT - NCAT, EOMI  Neck - Supple, No limited ROM  Chest - CTA bilaterally  Cardiovascular - RRR, S1S2  Abdomen -BS+, Soft, NTND  Extremities - No C/C/E, No calf tenderness   Neurologic Exam - aox3, mild dysarthria, pérez, surgical site C/D/I    RECENT LABS:                          11.9   8.90  )-----------( 144      ( 2022 06:00 )             36.3     02-14    136  |  99  |  9   ----------------------------<  114<H>  4.5   |  31  |  0.87    Ca    8.8      2022 06:00    TPro  6.4  /  Alb  2.6<L>  /  TBili  0.8  /  DBili  x   /  AST  11  /  ALT  23  /  AlkPhos  67  02-14    LIVER FUNCTIONS - ( 2022 06:00 )  Alb: 2.6 g/dL / Pro: 6.4 g/dL / ALK PHOS: 67 U/L / ALT: 23 U/L / AST: 11 U/L / GGT: x                 Culture - Group A Streptococcus (collected 22 @ 16:57)  Source: .Throat Throat  Final Report (22 @ 07:31):    No Streptococcus pyogenes (Group A) isolated        CAPILLARY BLOOD GLUCOSE      POCT Blood Glucose.: 307 mg/dL (2022 10:57)  POCT Blood Glucose.: 113 mg/dL (2022 07:47)  POCT Blood Glucose.: 144 mg/dL (2022 21:37)  POCT Blood Glucose.: 147 mg/dL (2022 16:38)      MEDICATIONS  (STANDING):  atorvastatin 20 milliGRAM(s) Oral at bedtime  dexAMETHasone     Tablet 2 milliGRAM(s) Oral two times a day  dextrose 40% Gel 15 Gram(s) Oral once  dextrose 5%. 1000 milliLiter(s) (50 mL/Hr) IV Continuous <Continuous>  dextrose 5%. 1000 milliLiter(s) (100 mL/Hr) IV Continuous <Continuous>  dextrose 50% Injectable 25 Gram(s) IV Push once  dextrose 50% Injectable 12.5 Gram(s) IV Push once  dextrose 50% Injectable 25 Gram(s) IV Push once  enoxaparin Injectable 40 milliGRAM(s) SubCutaneous daily  glucagon  Injectable 1 milliGRAM(s) IntraMuscular once  insulin glargine Injectable (LANTUS) 16 Unit(s) SubCutaneous at bedtime  insulin lispro (ADMELOG) corrective regimen sliding scale   SubCutaneous three times a day before meals  insulin lispro (ADMELOG) corrective regimen sliding scale   SubCutaneous at bedtime  insulin lispro Injectable (ADMELOG) 5 Unit(s) SubCutaneous three times a day before meals  lacosamide Solution 100 milliGRAM(s) Oral two times a day  nystatin    Suspension 013615 Unit(s) Oral three times a day  pantoprazole    Tablet 40 milliGRAM(s) Oral before breakfast  polyethylene glycol 3350 17 Gram(s) Oral every 12 hours  senna 2 Tablet(s) Oral at bedtime  sodium chloride 2 Gram(s) Oral every 6 hours    MEDICATIONS  (PRN):  acetaminophen     Tablet .. 650 milliGRAM(s) Oral every 6 hours PRN Temp greater or equal to 38C (100.4F), Mild Pain (1 - 3)  benzocaine 15 mG/menthol 3.6 mG Lozenge 1 Lozenge Oral every 4 hours PRN Sore Throat

## 2022-02-14 NOTE — PROGRESS NOTE ADULT - ASSESSMENT
ASSESSMENT/PLAN  This is a 67 YO male with PMH of  HTN, DM2, and known brain mass. He was in the [place] for vacation in early January, when he was noted to be confused and not answering appropriately, so an MRI was done on Jan 7 showed L frontal enhancing mass with significant edema. He presented to [facility] on 1/24 for further evaluation and repeat CTH which  showed significant vasogenic edema and 1.5cm MLS. He underwent 1/27/22 for left sided craniotomy for tumor resection with gleolan. Post-op course was complicated by possible seizures with EEG negative, hyponatermia with salt tabs added to regimen. 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 He was admitted to [facility] but tested positive for covid. He was asymptomatic but trasnferred to medical floor for quarantine. His subsequent covid swabs came back negative. Patient was deemed stable to return to [facility]  on 2/11/22. Patient  with gait Instability, ADL impairments and Functional impairments.    #Left frontal tumor ( glioma)  s/p craniotomy for resection with right sided drift, Gait Instability, ADL impairments and Functional impairments  - Start Comprehensive Rehab Program of PT/OT/SLP  - Monitor incision -staples removed 2/14  -Continue Vimpat 100mg BID for seizure prophylaxis  -Continue Decadron slow taper for cerebral edema- now on 2mg Q12h - to maintain until follow up with neurosurgery   -Will require follow up with neuro-oncology     # Hyponatremia - improved   -Discontinue fluid restriction  -Continue salt tabs 1G Q6h    #HTN  -Continue lisinopril 5mg daily    #HLD   -Continue atorvastatin 20mg daily    #Type 2 DM  - A1c is 10.7 (1/25)  -FS and ISS  -Continue Lantus 15 units at bedtime  - continue premeal admelog 8U at breakfast and dinner and 6U at lunch    #Pain control  - Tylenol PRN    #Covid 19  - positive on 2/8- likely false positive  - Negative  2/9 and 2/10    #GI/Bowel Mgmt   - Continue Senna at bedtime   - Miralax BID    #Bladder management  - Continue to monitor PVR q 8 hours (SC if > 400)  -Monitor UO    #DVT  - Lovenox  - SCDs  - TEDs     #Skin:  -Incisional wound care per nursing. Surgical staples in place, wound intact    FEN   - Diet - Easy to Chew+ Thins  [CCHO, DASH/TLC]    - Dysphagia  SLP - evaluation and treatment      #Precaution  - Fall, Aspiration, cardiac        Outpatient Follow-up (Specialty/Name of physician):  Parker hook)  Neurosurgery  [address] [place] [address]  Phone: [phone]  Fax: [phone]  Follow Up Time:     Matthew Hill)  Neurology  [facility], [address] [place] [address]  Phone: [phone]  Fax: [phone]  Follow Up Time:     Kristopher Guevara  RADIATION ONCOLOGY  [address] [place] [address] A [place] [address]  Phone: [phone]  Fax: [phone]

## 2022-02-14 NOTE — PROGRESS NOTE ADULT - SUBJECTIVE AND OBJECTIVE BOX
REHABILITATION DIAGNOSIS:  Neoplasm of uncertain behavior of brain        COMORBIDITIES/COMPLICATING CONDITIONS IMPACTING REHABILITATION:  HEALTH ISSUES - PROBLEM Dx:    - cerebral edema  - sz ppx  - gait impairment  - debility  - cognitive impairment     PAST MEDICAL & SURGICAL HISTORY:  Diabetes mellitus    Hypertension    Glioblastoma  just diagnosed - 1/22/22    S/P craniotomy        Based upon consideration of the patient's impairments, functional status, complicating conditions and any other contributing factors and after information garnered from the assessments of all therapy disciplines involved in treating the patient and other pertinent clinicians:    INTERDISCIPLINARY REHABILITATION INTERVENTIONS:    [  x ] Transfer Training  [   x] Bed Mobility  [  x ] Therapeutic Exercise  [  x ] Balance/Coordination Exercises  [  x ] Locomotion training  [  x ] Stairs    [ x  ] Functional transfers  [ x  ] Activities of daily living  [  x ] Visual Perceptual training    [ x  ] Bowel/Bladder program  [  x ] Pain Management  [  x] Skin/Wound Care    [  x] Speech/Communication Exercise  [   x] Swallowing Exercises    [ x  ] Cognitive Exercises  [  x ] Cognitive-linguistic Treatment  [x   ] Behavioral Program    [  x ] Patient/Family Counseling  [  x ] Family Training  [ x  ] Community Re-entry  [   ] Orthotic Evaluation/Training  [   ] Prosthetic Eval/Training    MEDICAL PROGNOSIS:  ***    REHAB POTENTIAL:  ***  EXPECTED DAILY THERAPIES:    [x   ] PT  [x   ] OT  [ x  ] ST    EXPECTED INTENSITY OF PROGRAM:  3 hours a day    EXPECTED FREQUENCY OF PROGRAM:  5 days a week    ESTIMATED LOS:  7-10 days   ESTIMATED DISPOSITION:  Home with home care     INTERDISCIPLINARY FUNCTIONAL OUTCOMES/GOALS:         Gait/Mobility:       Transfers:       ADLs:       Functional Transfers:       Medication Management:       Communication:       Cognitive:       Nutrition:       Bladder:       Bowel:

## 2022-02-15 LAB
GLUCOSE BLDC GLUCOMTR-MCNC: 120 MG/DL — HIGH (ref 70–99)
GLUCOSE BLDC GLUCOMTR-MCNC: 155 MG/DL — HIGH (ref 70–99)
GLUCOSE BLDC GLUCOMTR-MCNC: 182 MG/DL — HIGH (ref 70–99)
GLUCOSE BLDC GLUCOMTR-MCNC: 255 MG/DL — HIGH (ref 70–99)

## 2022-02-15 PROCEDURE — 99232 SBSQ HOSP IP/OBS MODERATE 35: CPT

## 2022-02-15 RX ORDER — METFORMIN HYDROCHLORIDE 850 MG/1
500 TABLET ORAL
Refills: 0 | Status: DISCONTINUED | OUTPATIENT
Start: 2022-02-15 | End: 2022-02-21

## 2022-02-15 RX ORDER — INSULIN LISPRO 100/ML
5 VIAL (ML) SUBCUTANEOUS
Refills: 0 | Status: DISCONTINUED | OUTPATIENT
Start: 2022-02-15 | End: 2022-02-24

## 2022-02-15 RX ORDER — INSULIN LISPRO 100/ML
VIAL (ML) SUBCUTANEOUS
Refills: 0 | Status: DISCONTINUED | OUTPATIENT
Start: 2022-02-15 | End: 2022-02-24

## 2022-02-15 RX ADMIN — Medication 5 UNIT(S): at 07:52

## 2022-02-15 RX ADMIN — SODIUM CHLORIDE 1 GRAM(S): 9 INJECTION INTRAMUSCULAR; INTRAVENOUS; SUBCUTANEOUS at 00:50

## 2022-02-15 RX ADMIN — SODIUM CHLORIDE 1 GRAM(S): 9 INJECTION INTRAMUSCULAR; INTRAVENOUS; SUBCUTANEOUS at 05:27

## 2022-02-15 RX ADMIN — SODIUM CHLORIDE 1 GRAM(S): 9 INJECTION INTRAMUSCULAR; INTRAVENOUS; SUBCUTANEOUS at 11:52

## 2022-02-15 RX ADMIN — ATORVASTATIN CALCIUM 20 MILLIGRAM(S): 80 TABLET, FILM COATED ORAL at 21:26

## 2022-02-15 RX ADMIN — PANTOPRAZOLE SODIUM 40 MILLIGRAM(S): 20 TABLET, DELAYED RELEASE ORAL at 05:29

## 2022-02-15 RX ADMIN — Medication 1 SPRAY(S): at 05:27

## 2022-02-15 RX ADMIN — Medication 500000 UNIT(S): at 21:26

## 2022-02-15 RX ADMIN — INSULIN GLARGINE 16 UNIT(S): 100 INJECTION, SOLUTION SUBCUTANEOUS at 21:26

## 2022-02-15 RX ADMIN — Medication 2 MILLIGRAM(S): at 05:27

## 2022-02-15 RX ADMIN — Medication 2 MILLIGRAM(S): at 16:49

## 2022-02-15 RX ADMIN — SODIUM CHLORIDE 1 GRAM(S): 9 INJECTION INTRAMUSCULAR; INTRAVENOUS; SUBCUTANEOUS at 16:49

## 2022-02-15 RX ADMIN — Medication 2: at 16:48

## 2022-02-15 RX ADMIN — Medication 1 SPRAY(S): at 16:50

## 2022-02-15 RX ADMIN — LACOSAMIDE 100 MILLIGRAM(S): 50 TABLET ORAL at 16:56

## 2022-02-15 RX ADMIN — METFORMIN HYDROCHLORIDE 500 MILLIGRAM(S): 850 TABLET ORAL at 16:49

## 2022-02-15 RX ADMIN — Medication 5 UNIT(S): at 16:50

## 2022-02-15 RX ADMIN — POLYETHYLENE GLYCOL 3350 17 GRAM(S): 17 POWDER, FOR SOLUTION ORAL at 05:27

## 2022-02-15 RX ADMIN — Medication 5 UNIT(S): at 11:53

## 2022-02-15 RX ADMIN — ENOXAPARIN SODIUM 40 MILLIGRAM(S): 100 INJECTION SUBCUTANEOUS at 11:52

## 2022-02-15 RX ADMIN — SODIUM CHLORIDE 1 GRAM(S): 9 INJECTION INTRAMUSCULAR; INTRAVENOUS; SUBCUTANEOUS at 23:20

## 2022-02-15 RX ADMIN — LACOSAMIDE 100 MILLIGRAM(S): 50 TABLET ORAL at 05:27

## 2022-02-15 RX ADMIN — Medication 1: at 07:52

## 2022-02-15 RX ADMIN — Medication 500000 UNIT(S): at 05:27

## 2022-02-15 RX ADMIN — Medication 6: at 11:53

## 2022-02-15 NOTE — PROGRESS NOTE ADULT - SUBJECTIVE AND OBJECTIVE BOX
CHIEF COMPLAINT: He states he is feeling well today. No complaints. Participating well in therapy.     Patient is a 66y old  Male who presents with a chief complaint of Glioblastoma (13 Feb 2022 13:01)    HPI:  This is a 65 YO male with PMH of  HTN, DM2, and known brain mass. He was [place] in early January, when pt was noted to be confused and not answering appropriately, so an MRI was done on Jan 7 showed L frontal enhancing mass with significant edema. He presented to [facility] on 1/24 for further evaluation and repeat CTH showed significant vasogenic edema and 1.5cm MLS. He underwent 1/27/22 for left sided craniotomy for tumor resection with gleolan.  Pt was started on decadron and Vimpat also.  Post op course was complicated by possible seizures with EEG negative. Hyponatermia with salt tabs added to regimen. 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 He was evaluated by PMR and was recommended for acute inpatient rehab. He was admitted to [facility] on 02/08/22, however COVID test came back positive. COVID tests from 02/04 and 02/06/22 were negative. Patient remains asymptomatic.     COVID swab from 2/9/2022 and 2/10/2022 are both negative. Per medical team, he does not need isolation at this time. COVID swab from 2/8/2022 was likely a false positive. He was seen by ENT for hoarseness - vocal cords mobile but with incomplete closure and Continue with speech and swallow therapy. Follow up with ENT/Laryngology, Dr. Graham Bergeron [phone] 2 weeks after discharge. Patient stable to return to [facility] IR[facility]  on 2/11/22.       (11 Feb 2022 15:12)      PAST MEDICAL & SURGICAL HISTORY:  Diabetes mellitus    Hypertension    Glioblastoma  just diagnosed - 1/22/22    S/P craniotomy        Allergies    No Known Allergies    Intolerances          PHYSICAL EXAM    Vital Signs Last 24 Hrs  T(C): 36.6 (15 Feb 2022 07:47), Max: 37 (14 Feb 2022 21:14)  T(F): 97.9 (15 Feb 2022 07:47), Max: 98.6 (14 Feb 2022 21:14)  HR: 61 (15 Feb 2022 07:47) (61 - 71)  BP: 122/77 (15 Feb 2022 07:47) (101/60 - 122/77)  BP(mean): --  RR: 16 (15 Feb 2022 07:47) (15 - 16)  SpO2: 97% (15 Feb 2022 07:47) (96% - 97%)      PHYSICAL EXAM  Constitutional - NAD, Comfortable  HEENT - NCAT, EOMI  Neck - Supple, No limited ROM  Chest - CTA bilaterally  Cardiovascular - RRR, S1S2  Abdomen -BS+, Soft, NTND  Extremities - No C/C/E, No calf tenderness   Neurologic Exam - aox3, mild dysarthria, pérez, surgical site C/D/I    RECENT LABS:                          11.9   8.90  )-----------( 144      ( 14 Feb 2022 06:00 )             36.3     02-14    136  |  99  |  9   ----------------------------<  114<H>  4.5   |  31  |  0.87    Ca    8.8      14 Feb 2022 06:00    TPro  6.4  /  Alb  2.6<L>  /  TBili  0.8  /  DBili  x   /  AST  11  /  ALT  23  /  AlkPhos  67  02-14    LIVER FUNCTIONS - ( 14 Feb 2022 06:00 )  Alb: 2.6 g/dL / Pro: 6.4 g/dL / ALK PHOS: 67 U/L / ALT: 23 U/L / AST: 11 U/L / GGT: x                 Culture - Group A Streptococcus (collected 02-12-22 @ 16:57)  Source: .Throat Throat  Final Report (02-14-22 @ 07:31):    No Streptococcus pyogenes (Group A) isolated          CAPILLARY BLOOD GLUCOSE      POCT Blood Glucose.: 255 mg/dL (15 Feb 2022 11:49)  POCT Blood Glucose.: 182 mg/dL (15 Feb 2022 07:45)  POCT Blood Glucose.: 190 mg/dL (14 Feb 2022 21:16)  POCT Blood Glucose.: 228 mg/dL (14 Feb 2022 17:10)      MEDICATIONS  (STANDING):  atorvastatin 20 milliGRAM(s) Oral at bedtime  dexAMETHasone     Tablet 2 milliGRAM(s) Oral two times a day  dextrose 40% Gel 15 Gram(s) Oral once  dextrose 5%. 1000 milliLiter(s) (50 mL/Hr) IV Continuous <Continuous>  dextrose 5%. 1000 milliLiter(s) (100 mL/Hr) IV Continuous <Continuous>  dextrose 50% Injectable 25 Gram(s) IV Push once  dextrose 50% Injectable 12.5 Gram(s) IV Push once  dextrose 50% Injectable 25 Gram(s) IV Push once  enoxaparin Injectable 40 milliGRAM(s) SubCutaneous daily  glucagon  Injectable 1 milliGRAM(s) IntraMuscular once  insulin glargine Injectable (LANTUS) 16 Unit(s) SubCutaneous at bedtime  insulin lispro (ADMELOG) corrective regimen sliding scale   SubCutaneous three times a day before meals  insulin lispro (ADMELOG) corrective regimen sliding scale   SubCutaneous at bedtime  insulin lispro Injectable (ADMELOG) 5 Unit(s) SubCutaneous three times a day before meals  lacosamide Solution 100 milliGRAM(s) Oral two times a day  nystatin    Suspension 322879 Unit(s) Oral three times a day  pantoprazole    Tablet 40 milliGRAM(s) Oral before breakfast  polyethylene glycol 3350 17 Gram(s) Oral every 12 hours  senna 2 Tablet(s) Oral at bedtime  sodium chloride 2 Gram(s) Oral every 6 hours    MEDICATIONS  (PRN):  acetaminophen     Tablet .. 650 milliGRAM(s) Oral every 6 hours PRN Temp greater or equal to 38C (100.4F), Mild Pain (1 - 3)  benzocaine 15 mG/menthol 3.6 mG Lozenge 1 Lozenge Oral every 4 hours PRN Sore Throat

## 2022-02-15 NOTE — PROGRESS NOTE ADULT - SUBJECTIVE AND OBJECTIVE BOX
Medicine Progress Note    Patient is a 66y old  Male who presents with a chief complaint of Glioblastoma (15 Feb 2022 12:21)      SUBJECTIVE / OVERNIGHT EVENTS:  seen and examined  Chart reviewed  No overnight events  Limb weakness improving with therapy  BM+  No pain  sore throat. no other complaints    ADDITIONAL REVIEW OF SYSTEMS:  no fever/chills/CP/sob/palpitation/dizziness/ abd pain/nausea/vomiting/diarrhea/constipation/headaches. all other ROS neg    MEDICATIONS  (STANDING):  atorvastatin 20 milliGRAM(s) Oral at bedtime  dexAMETHasone     Tablet 2 milliGRAM(s) Oral two times a day  dextrose 40% Gel 15 Gram(s) Oral once  dextrose 5%. 1000 milliLiter(s) (50 mL/Hr) IV Continuous <Continuous>  dextrose 5%. 1000 milliLiter(s) (100 mL/Hr) IV Continuous <Continuous>  dextrose 50% Injectable 25 Gram(s) IV Push once  dextrose 50% Injectable 12.5 Gram(s) IV Push once  dextrose 50% Injectable 25 Gram(s) IV Push once  enoxaparin Injectable 40 milliGRAM(s) SubCutaneous daily  fluticasone propionate 50 MICROgram(s)/spray Nasal Spray 1 Spray(s) Both Nostrils two times a day  glucagon  Injectable 1 milliGRAM(s) IntraMuscular once  insulin glargine Injectable (LANTUS) 16 Unit(s) SubCutaneous at bedtime  insulin lispro (ADMELOG) corrective regimen sliding scale   SubCutaneous three times a day before meals  lacosamide Solution 100 milliGRAM(s) Oral two times a day  metFORMIN 500 milliGRAM(s) Oral two times a day  nystatin    Suspension 527039 Unit(s) Oral three times a day  pantoprazole    Tablet 40 milliGRAM(s) Oral before breakfast  polyethylene glycol 3350 17 Gram(s) Oral every 12 hours  senna 2 Tablet(s) Oral at bedtime  sodium chloride 1 Gram(s) Oral every 6 hours    MEDICATIONS  (PRN):  acetaminophen     Tablet .. 650 milliGRAM(s) Oral every 6 hours PRN Temp greater or equal to 38C (100.4F), Mild Pain (1 - 3)  benzocaine 15 mG/menthol 3.6 mG Lozenge 1 Lozenge Oral every 4 hours PRN Sore Throat    CAPILLARY BLOOD GLUCOSE      POCT Blood Glucose.: 255 mg/dL (15 Feb 2022 11:49)  POCT Blood Glucose.: 182 mg/dL (15 Feb 2022 07:45)  POCT Blood Glucose.: 190 mg/dL (14 Feb 2022 21:16)  POCT Blood Glucose.: 228 mg/dL (14 Feb 2022 17:10)    I&O's Summary      PHYSICAL EXAM:  Vital Signs Last 24 Hrs  T(C): 36.6 (15 Feb 2022 07:47), Max: 37 (14 Feb 2022 21:14)  T(F): 97.9 (15 Feb 2022 07:47), Max: 98.6 (14 Feb 2022 21:14)  HR: 61 (15 Feb 2022 07:47) (61 - 71)  BP: 122/77 (15 Feb 2022 07:47) (101/60 - 122/77)  BP(mean): --  RR: 16 (15 Feb 2022 07:47) (15 - 16)  SpO2: 97% (15 Feb 2022 07:47) (96% - 97%)    GENERAL: Not in distress. Alert    HEENT: clear conjuctiva, MMM. no pallor or icterus. no nasal discharge or PND today. throat clear. no sinus TP  CARDIOVASCULAR: RRR S1, S2. No murmur/rubs/gallop  LUNGS: BLAE+, no rales, no wheezing, no rhonchi.    ABDOMEN: ND. Soft,  NT, no guarding / rebound / rigidity. BS normoactive  BACK: No spine tenderness.  EXTREMITIES: no edema. no leg or calf TP.  SKIN: warm and dry  PSYCHIATRIC: Calm.  No agitation.    LABS:                        11.9   8.90  )-----------( 144      ( 14 Feb 2022 06:00 )             36.3     02-14    136  |  99  |  9   ----------------------------<  114<H>  4.5   |  31  |  0.87    Ca    8.8      14 Feb 2022 06:00    TPro  6.4  /  Alb  2.6<L>  /  TBili  0.8  /  DBili  x   /  AST  11  /  ALT  23  /  AlkPhos  67  02-14              Culture - Group A Streptococcus (collected 12 Feb 2022 16:57)  Source: .Throat Throat  Final Report (14 Feb 2022 07:31):    No Streptococcus pyogenes (Group A) isolated      COVID-19 PCR: NotDetec (11 Feb 2022 22:10)  COVID-19 PCR: NotDetec (10 Feb 2022 11:57)  COVID-19 PCR: NotDetec (09 Feb 2022 10:20)  COVID-19 PCR: Detected (08 Feb 2022 22:50)  COVID-19 PCR: NotDetec (06 Feb 2022 13:16)  COVID-19 PCR: NotDetec (04 Feb 2022 12:20)  COVID-19 PCR: NotDetec (26 Jan 2022 22:36)  COVID-19 PCR: NotDetec (24 Jan 2022 19:40)      RADIOLOGY & ADDITIONAL TESTS:  Imaging from Last 24 Hours:    Electrocardiogram/QTc Interval:    COORDINATION OF CARE:  Care Discussed with Consultants/Other Providers:

## 2022-02-15 NOTE — PROGRESS NOTE ADULT - ASSESSMENT
ASSESSMENT/PLAN  66 year old male with PMH of  HTN, DM2, and known brain mass. He was in the [place] for vacation in early January, when he was noted to be confused and not answering appropriately, so an MRI was done on Jan 7 showed L frontal enhancing mass with significant edema. He presented to [facility] on 1/24 for further evaluation and repeat CTH which  showed significant vasogenic edema and 1.5cm MLS. He underwent 1/27/22 for left sided craniotomy for tumor resection with gleolan. Post-op course was complicated by possible seizures with EEG negative, hyponatermia with salt tabs added to regimen. 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 He was admitted to [facility] but tested positive for covid. He was asymptomatic but trasnferred to medical floor for quarantine. His subsequent covid swabs came back negative. Patient was deemed stable to return to [facility]  on 2/11/22. Patient  with gait Instability, ADL impairments and Functional impairments.    #Left frontal tumor s/p craniotomy for resection with right sided drift, Gait Instability, ADL impairments and Functional impairments  - Continue Comprehensive Rehab Program of PT/OT/SLP  - Monitor incision -staples removed 2/14 - tolerated well  -Continue Vimpat 100mg BID for seizure prophylaxis  -Continue Decadron slow taper- now on 2mg Q12h - to maintain until follow up with neurosurgery   -Will require follow up with neuro-oncology     #Hyponatremia  -Continue salt tabs 1G Q6h  - monitor NA  - Na 136 (2/14)    #HTN  -Home med: lisinopril 5mg daily  -BP controlled     #HLD   -Continue atorvastatin 20mg daily    #Type 2 DM  - A1c is 10.7 (1/25)  -FS and ISS  -Continue Lantus 16 units at bedtime  -Continue metformin 500mg BID     #Pain control  - Tylenol PRN    #Covid 19 - false positive   - positive on 2/8- likely false positive  - Negative  2/9 and 2/10  -Continues to be asymptomatic    #GI/Bowel Mgmt   - Continue Senna at bedtime   - Miralax BID    #Bladder management  - Continue to monitor PVR q 8 hours (SC if > 400)  -Monitor UO    #DVT  - Lovenox  - SCDs  - TEDs     #Skin:  -Incisional wound care per nursing. Surgical staples removed, wound intact    FEN   - Diet - Puree+ mild thick [CCHO, DASH/TLC]    - Dysphagia  SLP - evaluation and treatment    #Precaution  - Fall, Aspiration, cardiac      #Dysphagia    - SLP: evaluation and treatment    #Mood/Cognition:  - Neuropsychology consult PRN    Outpatient Follow-up (Specialty/Name of physician):  Parker hook)  Neurosurgery  [address] [place] [address]  Phone: [phone]  Fax: [phone]  Follow Up Time:     Matthew Hill)  Neurology  [facility], [address] [place] [address]  Phone: [phone]  Fax: [phone]  Follow Up Time:     Kristopher Guevara  RADIATION ONCOLOGY  [address] [place] [address] - [place] [address]  Phone: [phone]  Fax: [phone]

## 2022-02-15 NOTE — PROGRESS NOTE ADULT - ASSESSMENT
67 y/o man with PMH of  HTN, DM2, and recent discovery of brain Glioblastoma s/p 1/27/22 for left sided craniotomy. Post-op course was complicated by possible hyponatermia with salt tabs added to regimen. 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 He was admitted to [facility] for functional and gair impairment      Functional and gait instability:  - C/w PT/OT per primary team      Left frontal GBM s/p craniotomy for resection with right sided drift  -Continue Vimpat 100mg BID for seizure prophylaxis  -Continue Decadron slow taper- now on 2mg Q12h - to maintain until follow up with neurosurgery   -Will require follow up with neuro-oncology     Hyponatremia  -Continue fluid restriction  -Continue salt tabs 2G Q6h. wean off as able  - sodium normalized. can DC fluid restriction 2/14    HTN  -lisinopril has been held due to soft BP     HLD   -Continue atorvastatin 20mg daily    Type 2 DM  - A1c is 10.7 (1/25)  -FS and ISS  -Continue Lantus 16 units at bedtime  - continue premeal admelog 5U at breakfast and dinner and 6U at lunch    Sore throat: likely due to PND  - cepacol  - flonase  - monitor temp/cbc    DVT PPX  - Lovenox  - SCDs  - TEDs       d/w Hilda [ NP] 65 y/o man with PMH of  HTN, DM2, and recent discovery of brain Glioblastoma s/p 1/27/22 for left sided craniotomy. Post-op course was complicated by possible hyponatermia with salt tabs added to regimen. 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 He was admitted to [facility] for functional and gair impairment      Functional and gait instability:  - C/w PT/OT per primary team      Left frontal GBM s/p craniotomy for resection with right sided drift  -Continue Vimpat 100mg BID for seizure prophylaxis  -Continue Decadron slow taper- now on 2mg Q12h - to maintain until follow up with neurosurgery   -Will require follow up with neuro-oncology     Hyponatremia  -Continue fluid restriction  -Continue salt tabs 2G Q6h. wean off as able  - sodium normalized. can DC fluid restriction 2/14    HTN  -lisinopril has been held due to soft BP     HLD   -Continue atorvastatin 20mg daily    Type 2 DM uncontrolled  - A1c is 10.7 (1/25)  -FS and ISS  -Continue Lantus 16 units at bedtime  - added metformin 500 mg BID on 2/15. can increase to 1000 mg BID after 2-3 days if tolerating well and crcl>30   - c/w humalog 5 unit TIDAC. may need to reduce pre-meals while titrating up metformin  - Changed ISS to moderate dose on 2/15  - watch for hypoglycemia  - DM NP Natividad is following    Sore throat: likely due to PND  - cepacol  - flonase  - monitor temp/cbc    DVT PPX  - Lovenox  - SCDs  - TEDs       d/w Hilda [ NP]

## 2022-02-15 NOTE — ADVANCED PRACTICE NURSE CONSULT - RECOMMEDATIONS
Recommendations  BG goal 100- 180 mg/dl  Start Metformin 500 mg twice a day, check renal function in 2- 3days, if WNL and no GI distress from Metformin- titrate to 1000 mg twice a day.   C/W insulin glargine Injectable (LANTUS) 16 Unit(s) SubCutaneous at bedtime  Change insulin lispro (ADMELOG) from low to moderate corrective regimen sliding scale   SubCutaneous three times a day before meals   Change insulin lispro (ADMELOG) from low to moderate corrective regimen sliding scale   SubCutaneous at bedtime  Discontinue insulin lispro Injectable (ADMELOG) 5 Unit(s) SubCutaneous three times a day before meals    Discharge Recommendations:  1. Lantus Solostar Pen 100 units/ml (on favorite list)   16 units SubCutaneous DAILY- dose per needs at time of discharge    2. Humalog kwikpen 100 units/ml SubCutaneous, moderate correction scale before meals, three times/day (on favorite list) dose per needs at time of discharge  3. Insulin pen needles 4 mm- yahaira- (on favorite list)  4. Alcohol swabs- (on favorite list)  5. BG meter per insurance- (on favorite list)  6. BG test strips per insurance- (on favorite list)  7. Lancets- per insurance -(on favorite list)  8. Metformin  mg tablets orally- 2 tablets 2 times/day  9. Glucose Tablets 4 Gms- take 4 tablets as needed for BG > 70  10. Follow up with PCP Avery Juarez [phone]  11. Would benefit from Endocrinology consult upon discharge for CV risk lower diabetes agents.    Recommendations  BG goal 100- 180 mg/dl  Start Metformin 500 mg twice a day, check renal function in 2- 3days, if WNL and no GI distress from Metformin- titrate to 1000 mg twice a day.   C/W insulin glargine Injectable (LANTUS) 16 Unit(s) SubCutaneous at bedtime  Change insulin lispro (ADMELOG) from low to moderate corrective regimen sliding scale   SubCutaneous three times a day before meals   Change insulin lispro (ADMELOG) from low to moderate corrective regimen sliding scale   SubCutaneous at bedtime  Discontinue insulin lispro Injectable (ADMELOG) 5 Unit(s) SubCutaneous three times a day before meals    Discharge Recommendations:  1. Lantus Solostar Pen 100 units/ml (on favorite list)   16 units SubCutaneous DAILY- dose per needs at time of discharge    2. Humalog kwikpen 100 units/ml SubCutaneous, moderate correction scale before meals, three times/day (on favorite list) dose per needs at time of discharge  3. Insulin pen needles 4 mm- yahaira- (on favorite list)  4. Alcohol swabs- (on favorite list)  5. BG meter per insurance- (on favorite list)  6. BG test strips per insurance- (on favorite list)  7. Lancets- per insurance -(on favorite list)  8. Metformin  mg tablets orally- 2 tablets 2 times/day  9. Glucose Tablets 4 Gms- take 4 tablets as needed for BG <  70  10. Follow up with PCP Avery Juarez [phone]  11. Would benefit from Endocrinology consult upon discharge for CV risk lower diabetes agents.

## 2022-02-16 LAB
GLUCOSE BLDC GLUCOMTR-MCNC: 127 MG/DL — HIGH (ref 70–99)
GLUCOSE BLDC GLUCOMTR-MCNC: 171 MG/DL — HIGH (ref 70–99)
GLUCOSE BLDC GLUCOMTR-MCNC: 171 MG/DL — HIGH (ref 70–99)
GLUCOSE BLDC GLUCOMTR-MCNC: 251 MG/DL — HIGH (ref 70–99)

## 2022-02-16 PROCEDURE — 74230 X-RAY XM SWLNG FUNCJ C+: CPT | Mod: 26

## 2022-02-16 PROCEDURE — 99233 SBSQ HOSP IP/OBS HIGH 50: CPT

## 2022-02-16 RX ORDER — GABAPENTIN 400 MG/1
100 CAPSULE ORAL AT BEDTIME
Refills: 0 | Status: DISCONTINUED | OUTPATIENT
Start: 2022-02-16 | End: 2022-02-24

## 2022-02-16 RX ADMIN — Medication 500000 UNIT(S): at 21:52

## 2022-02-16 RX ADMIN — Medication 500000 UNIT(S): at 13:47

## 2022-02-16 RX ADMIN — Medication 6: at 11:09

## 2022-02-16 RX ADMIN — SODIUM CHLORIDE 1 GRAM(S): 9 INJECTION INTRAMUSCULAR; INTRAVENOUS; SUBCUTANEOUS at 23:47

## 2022-02-16 RX ADMIN — SODIUM CHLORIDE 1 GRAM(S): 9 INJECTION INTRAMUSCULAR; INTRAVENOUS; SUBCUTANEOUS at 11:11

## 2022-02-16 RX ADMIN — Medication 5 UNIT(S): at 08:06

## 2022-02-16 RX ADMIN — LACOSAMIDE 100 MILLIGRAM(S): 50 TABLET ORAL at 17:12

## 2022-02-16 RX ADMIN — INSULIN GLARGINE 16 UNIT(S): 100 INJECTION, SOLUTION SUBCUTANEOUS at 21:55

## 2022-02-16 RX ADMIN — Medication 5 UNIT(S): at 11:09

## 2022-02-16 RX ADMIN — Medication 2: at 16:24

## 2022-02-16 RX ADMIN — SENNA PLUS 2 TABLET(S): 8.6 TABLET ORAL at 21:52

## 2022-02-16 RX ADMIN — Medication 2: at 08:07

## 2022-02-16 RX ADMIN — GABAPENTIN 100 MILLIGRAM(S): 400 CAPSULE ORAL at 21:52

## 2022-02-16 RX ADMIN — Medication 5 UNIT(S): at 16:23

## 2022-02-16 RX ADMIN — ENOXAPARIN SODIUM 40 MILLIGRAM(S): 100 INJECTION SUBCUTANEOUS at 11:11

## 2022-02-16 RX ADMIN — Medication 1 SPRAY(S): at 17:12

## 2022-02-16 RX ADMIN — BENZOCAINE AND MENTHOL 1 LOZENGE: 5; 1 LIQUID ORAL at 16:27

## 2022-02-16 RX ADMIN — Medication 2 MILLIGRAM(S): at 17:12

## 2022-02-16 RX ADMIN — Medication 500000 UNIT(S): at 05:32

## 2022-02-16 RX ADMIN — METFORMIN HYDROCHLORIDE 500 MILLIGRAM(S): 850 TABLET ORAL at 08:06

## 2022-02-16 RX ADMIN — PANTOPRAZOLE SODIUM 40 MILLIGRAM(S): 20 TABLET, DELAYED RELEASE ORAL at 05:33

## 2022-02-16 RX ADMIN — BENZOCAINE AND MENTHOL 1 LOZENGE: 5; 1 LIQUID ORAL at 11:11

## 2022-02-16 RX ADMIN — Medication 1 SPRAY(S): at 05:32

## 2022-02-16 RX ADMIN — SODIUM CHLORIDE 1 GRAM(S): 9 INJECTION INTRAMUSCULAR; INTRAVENOUS; SUBCUTANEOUS at 05:33

## 2022-02-16 RX ADMIN — Medication 2 MILLIGRAM(S): at 05:33

## 2022-02-16 RX ADMIN — SODIUM CHLORIDE 1 GRAM(S): 9 INJECTION INTRAMUSCULAR; INTRAVENOUS; SUBCUTANEOUS at 17:12

## 2022-02-16 RX ADMIN — LACOSAMIDE 100 MILLIGRAM(S): 50 TABLET ORAL at 05:33

## 2022-02-16 RX ADMIN — METFORMIN HYDROCHLORIDE 500 MILLIGRAM(S): 850 TABLET ORAL at 17:12

## 2022-02-16 RX ADMIN — ATORVASTATIN CALCIUM 20 MILLIGRAM(S): 80 TABLET, FILM COATED ORAL at 21:52

## 2022-02-16 NOTE — PROGRESS NOTE ADULT - ASSESSMENT
ASSESSMENT/PLAN  66 year old male with PMH of  HTN, DM2, and known brain mass. He was in the [place] for vacation in early January, when he was noted to be confused and not answering appropriately, so an MRI was done on Jan 7 showed L frontal enhancing mass with significant edema. He presented to [facility] on 1/24 for further evaluation and repeat CTH which  showed significant vasogenic edema and 1.5cm MLS. He underwent 1/27/22 for left sided craniotomy for tumor resection with gleolan. Post-op course was complicated by possible seizures with EEG negative, hyponatermia with salt tabs added to regimen. 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 He was admitted to [facility] but tested positive for covid. He was asymptomatic but trasnferred to medical floor for quarantine. His subsequent covid swabs came back negative. Patient was deemed stable to return to [facility]  on 2/11/22. Patient  with gait Instability, ADL impairments and Functional impairments.    #Left frontal tumor s/p craniotomy for resection with right sided drift, Gait Instability, ADL impairments and Functional impairments  - Continue Comprehensive Rehab Program of PT/OT/SLP  - Monitor incision -staples removed 2/14 - tolerated well  -Continue Vimpat 100mg BID for seizure prophylaxis  -Continue Decadron slow taper- now on 2mg Q12h - to maintain until follow up with neurosurgery   -Will require follow up with neuro-oncology     #Muscular chest pain  - Not cardiac in nature  - No associated SOB or tachycardia  - Start Gabapentin 100mg at bedtime    #Hyponatremia  -Continue salt tabs 1G Q6h  - monitor NA  - Na 136 (2/14)    #HTN  -Home med: lisinopril 5mg daily  -BP controlled     #HLD   -Continue atorvastatin 20mg daily    #Type 2 DM  - A1c is 10.7 (1/25)  -FS and ISS  -Continue Lantus 16 units at bedtime  -Continue metformin 500mg BID     #Pain control  - Tylenol PRN    #Covid 19 - false positive   - positive on 2/8- likely false positive  - Negative  2/9 and 2/10  -Continues to be asymptomatic    #GI/Bowel Mgmt   - Continue Senna at bedtime   - Miralax BID    #Bladder management  - Continue to monitor PVR q 8 hours (SC if > 400)  -Monitor UO    #DVT  - Lovenox  - SCDs  - TEDs     #Skin:  -Incisional wound care per nursing. Surgical staples removed, wound intact    FEN   - Diet -regular with thin liquids [CCHO, DASH/TLC]    -Passed [facility] 2/16    - Dysphagia  SLP - evaluation and treatment        Outpatient Follow-up (Specialty/Name of physician):  Parker Gupta)  Neurosurgery  [address] [place] [address]  Phone: [phone]  Fax: [phone]  Follow Up Time:     Matthew Hill)  Neurology  [facility], [address] [place] [address]  Phone: [phone]  Fax: [phone]  Follow Up Time:     Kristopher Guevara  RADIATION ONCOLOGY  [address] [place] [address] A [place] [address]  Phone: [phone]  Fax: [phone]       ASSESSMENT/PLAN  66 year old male with PMH of  HTN, DM2, and known brain mass. He was in the [place] for vacation in early January, when he was noted to be confused and not answering appropriately, so an MRI was done on Jan 7 showed L frontal enhancing mass with significant edema. He presented to [facility] on 1/24 for further evaluation and repeat CTH which  showed significant vasogenic edema and 1.5cm MLS. He underwent 1/27/22 for left sided craniotomy for tumor resection with gleolan. Post-op course was complicated by possible seizures with EEG negative, hyponatermia with salt tabs added to regimen. 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 He was admitted to [facility] but tested positive for covid. He was asymptomatic but trasnferred to medical floor for quarantine. His subsequent covid swabs came back negative. Patient was deemed stable to return to [facility]  on 2/11/22. Patient  with gait Instability, ADL impairments and Functional impairments.    #Left frontal tumor s/p craniotomy for resection with right sided drift, Gait Instability, ADL impairments and Functional impairments  - Continue Comprehensive Rehab Program of PT/OT/SLP  - Monitor incision -staples removed 2/14 - tolerated well  -Continue Vimpat 100mg BID for seizure prophylaxis  -Continue Decadron slow taper- now on 2mg Q12h - to maintain until follow up with neurosurgery   -Will require follow up with neuro-oncology     #Muscular chest pain  - Not cardiac in nature  - No associated SOB or tachycardia  - Start Gabapentin 100mg at bedtime    #Hyponatremia  -Continue salt tabs 1G Q6h  - monitor NA  - Na 136 (2/14)    #HTN  -Home med: lisinopril 5mg daily  -BP controlled     #HLD   -Continue atorvastatin 20mg daily    #Type 2 DM  - A1c is 10.7 (1/25)  -FS and ISS  -Continue Lantus 16 units at bedtime  -Continue metformin 500mg BID     #Pain control  - Tylenol PRN    #Covid 19 - false positive   - positive on 2/8- likely false positive  - Negative  2/9 and 2/10  -Continues to be asymptomatic    #GI/Bowel Mgmt   - Continue Senna at bedtime   - Miralax BID    #Bladder management  - Continue to monitor PVR q 8 hours (SC if > 400)  -Monitor UO    #DVT  - Lovenox  - SCDs  - TEDs     #Skin:  -Incisional wound care per nursing. Surgical staples removed, wound intact    FEN   - Diet -regular with thin liquids [CCHO, DASH/TLC]    -Passed MBS 2/16    - Dysphagia  SLP - evaluation and treatment        Outpatient Follow-up (Specialty/Name of physician):  Parker Gupta)  Neurosurgery  [address] [place] [address]  Phone: [phone]  Fax: [phone]  Follow Up Time:     Matthew Hill)  Neurology  [facility], [address] [place] [address]  Phone: [phone]  Fax: [phone]  Follow Up Time:     Kristopher Guevara  RADIATION ONCOLOGY  [address] [place] [address] A - [place] [address]  Phone: [phone]  Fax: [phone]    TEAM MEETING 2/16/22  SW:  moving in with daughter, family training needed  OT: Mod I for eating/grooming, Sv bathing and transfers, min for toileting  PT: SV for transfers, 100' amb no device with SV, 12 stairs with SV  SLP: MBS passed for regular with thins, delayed emptying may need GI as outpatient  barriers: cognition, comprehension, needs 24/7 supervision, impulsive   goals: Mod I for adls and transfers, SV for ambulation  EDOD: Home with 24/7 2/25

## 2022-02-16 NOTE — PROGRESS NOTE ADULT - SUBJECTIVE AND OBJECTIVE BOX
SUBJECTIVE: He states he is having muscular chest and neck pain that is worse when he sleeps. He states that tylenol does help to relieve the discomfort.     Patient is a 66y old  Male who presents with a chief complaint of Glioblastoma (13 Feb 2022 13:01)    HPI:  This is a 67 YO male with PMH of  HTN, DM2, and known brain mass. He was in the [place] for vacation in early January, when pt was noted to be confused and not answering appropriately, so an MRI was done on Jan 7 showed L frontal enhancing mass with significant edema. He presented to [facility] on 1/24 for further evaluation and repeat CTH showed significant vasogenic edema and 1.5cm MLS. He underwent 1/27/22 for left sided craniotomy for tumor resection with gleolan.  Pt was started on decadron and Vimpat also.  Post op course was complicated by possible seizures with EEG negative. Hyponatermia with salt tabs added to regimen. 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 He was evaluated by PMR and was recommended for acute inpatient rehab. He was admitted to [facility] on 02/08/22, however COVID test came back positive. COVID tests from 02/04 and 02/06/22 were negative. Patient remains asymptomatic.     COVID swab from 2/9/2022 and 2/10/2022 are both negative. Per medical team, he does not need isolation at this time. COVID swab from 2/8/2022 was likely a false positive. He was seen by ENT for hoarseness - vocal cords mobile but with incomplete closure and Continue with speech and swallow therapy. Follow up with ENT/Laryngology, Dr. Graham Bergeron [phone] 2 weeks after discharge. Patient stable to return to [facility]  on 2/11/22.       (11 Feb 2022 15:12)      PAST MEDICAL & SURGICAL HISTORY:  Diabetes mellitus    Hypertension    Glioblastoma  just diagnosed - 1/22/22    S/P craniotomy        Allergies    No Known Allergies    Intolerances          PHYSICAL EXAM    Vital Signs Last 24 Hrs  T(C): 36.3 (16 Feb 2022 08:54), Max: 36.5 (15 Feb 2022 19:45)  T(F): 97.4 (16 Feb 2022 08:54), Max: 97.7 (15 Feb 2022 19:45)  HR: 92 (16 Feb 2022 08:54) (66 - 92)  BP: 92/57 (16 Feb 2022 08:54) (92/57 - 96/59)  BP(mean): --  RR: 15 (16 Feb 2022 08:54) (15 - 15)  SpO2: 98% (16 Feb 2022 08:54) (95% - 98%)      PHYSICAL EXAM  Constitutional - NAD, Comfortable  HEENT - NCAT, EOMI  Neck - Supple, No limited ROM  Chest - CTA bilaterally  Cardiovascular - RRR, S1S2  Abdomen -BS+, Soft, NTND  Extremities - No C/C/E, No calf tenderness   Neurologic Exam - aox3, mild dysarthria, pérez, surgical site C/D/I    RECENT LABS:                          11.9   8.90  )-----------( 144      ( 14 Feb 2022 06:00 )             36.3     02-14    136  |  99  |  9   ----------------------------<  114<H>  4.5   |  31  |  0.87    Ca    8.8      14 Feb 2022 06:00    TPro  6.4  /  Alb  2.6<L>  /  TBili  0.8  /  DBili  x   /  AST  11  /  ALT  23  /  AlkPhos  67  02-14    LIVER FUNCTIONS - ( 14 Feb 2022 06:00 )  Alb: 2.6 g/dL / Pro: 6.4 g/dL / ALK PHOS: 67 U/L / ALT: 23 U/L / AST: 11 U/L / GGT: x                 Culture - Group A Streptococcus (collected 02-12-22 @ 16:57)  Source: .Throat Throat  Final Report (02-14-22 @ 07:31):    No Streptococcus pyogenes (Group A) isolated        CAPILLARY BLOOD GLUCOSE      POCT Blood Glucose.: 251 mg/dL (16 Feb 2022 11:07)  POCT Blood Glucose.: 171 mg/dL (16 Feb 2022 08:05)  POCT Blood Glucose.: 120 mg/dL (15 Feb 2022 21:19)  POCT Blood Glucose.: 155 mg/dL (15 Feb 2022 16:44)  POCT Blood Glucose.: 255 mg/dL (15 Feb 2022 11:49)      MEDICATIONS  (STANDING):  atorvastatin 20 milliGRAM(s) Oral at bedtime  dexAMETHasone     Tablet 2 milliGRAM(s) Oral two times a day  dextrose 40% Gel 15 Gram(s) Oral once  dextrose 5%. 1000 milliLiter(s) (50 mL/Hr) IV Continuous <Continuous>  dextrose 5%. 1000 milliLiter(s) (100 mL/Hr) IV Continuous <Continuous>  dextrose 50% Injectable 25 Gram(s) IV Push once  dextrose 50% Injectable 12.5 Gram(s) IV Push once  dextrose 50% Injectable 25 Gram(s) IV Push once  enoxaparin Injectable 40 milliGRAM(s) SubCutaneous daily  fluticasone propionate 50 MICROgram(s)/spray Nasal Spray 1 Spray(s) Both Nostrils two times a day  gabapentin 100 milliGRAM(s) Oral at bedtime  glucagon  Injectable 1 milliGRAM(s) IntraMuscular once  insulin glargine Injectable (LANTUS) 16 Unit(s) SubCutaneous at bedtime  insulin lispro (ADMELOG) corrective regimen sliding scale   SubCutaneous three times a day before meals  insulin lispro Injectable (ADMELOG) 5 Unit(s) SubCutaneous three times a day before meals  lacosamide Solution 100 milliGRAM(s) Oral two times a day  metFORMIN 500 milliGRAM(s) Oral two times a day  nystatin    Suspension 712679 Unit(s) Oral three times a day  pantoprazole    Tablet 40 milliGRAM(s) Oral before breakfast  polyethylene glycol 3350 17 Gram(s) Oral every 12 hours  senna 2 Tablet(s) Oral at bedtime  sodium chloride 1 Gram(s) Oral every 6 hours    MEDICATIONS  (PRN):  acetaminophen     Tablet .. 650 milliGRAM(s) Oral every 6 hours PRN Temp greater or equal to 38C (100.4F), Mild Pain (1 - 3)  benzocaine 15 mG/menthol 3.6 mG Lozenge 1 Lozenge Oral every 4 hours PRN Sore Throat

## 2022-02-17 LAB
ALBUMIN SERPL ELPH-MCNC: 2.7 G/DL — LOW (ref 3.3–5)
ALP SERPL-CCNC: 76 U/L — SIGNIFICANT CHANGE UP (ref 40–120)
ALT FLD-CCNC: 18 U/L — SIGNIFICANT CHANGE UP (ref 10–45)
ANION GAP SERPL CALC-SCNC: 9 MMOL/L — SIGNIFICANT CHANGE UP (ref 5–17)
AST SERPL-CCNC: 6 U/L — LOW (ref 10–40)
BILIRUB SERPL-MCNC: 0.7 MG/DL — SIGNIFICANT CHANGE UP (ref 0.2–1.2)
BUN SERPL-MCNC: 18 MG/DL — SIGNIFICANT CHANGE UP (ref 7–23)
CALCIUM SERPL-MCNC: 9.1 MG/DL — SIGNIFICANT CHANGE UP (ref 8.4–10.5)
CHLORIDE SERPL-SCNC: 99 MMOL/L — SIGNIFICANT CHANGE UP (ref 96–108)
CO2 SERPL-SCNC: 26 MMOL/L — SIGNIFICANT CHANGE UP (ref 22–31)
CREAT SERPL-MCNC: 0.9 MG/DL — SIGNIFICANT CHANGE UP (ref 0.5–1.3)
GLUCOSE BLDC GLUCOMTR-MCNC: 153 MG/DL — HIGH (ref 70–99)
GLUCOSE BLDC GLUCOMTR-MCNC: 157 MG/DL — HIGH (ref 70–99)
GLUCOSE BLDC GLUCOMTR-MCNC: 192 MG/DL — HIGH (ref 70–99)
GLUCOSE BLDC GLUCOMTR-MCNC: 224 MG/DL — HIGH (ref 70–99)
GLUCOSE BLDC GLUCOMTR-MCNC: 96 MG/DL — SIGNIFICANT CHANGE UP (ref 70–99)
GLUCOSE SERPL-MCNC: 196 MG/DL — HIGH (ref 70–99)
HCT VFR BLD CALC: 36.9 % — LOW (ref 39–50)
HGB BLD-MCNC: 12.2 G/DL — LOW (ref 13–17)
MCHC RBC-ENTMCNC: 31.4 PG — SIGNIFICANT CHANGE UP (ref 27–34)
MCHC RBC-ENTMCNC: 33.1 GM/DL — SIGNIFICANT CHANGE UP (ref 32–36)
MCV RBC AUTO: 95.1 FL — SIGNIFICANT CHANGE UP (ref 80–100)
NRBC # BLD: 0 /100 WBCS — SIGNIFICANT CHANGE UP (ref 0–0)
PLATELET # BLD AUTO: 145 K/UL — LOW (ref 150–400)
POTASSIUM SERPL-MCNC: 4.3 MMOL/L — SIGNIFICANT CHANGE UP (ref 3.5–5.3)
POTASSIUM SERPL-SCNC: 4.3 MMOL/L — SIGNIFICANT CHANGE UP (ref 3.5–5.3)
PROT SERPL-MCNC: 7 G/DL — SIGNIFICANT CHANGE UP (ref 6–8.3)
RBC # BLD: 3.88 M/UL — LOW (ref 4.2–5.8)
RBC # FLD: 13.2 % — SIGNIFICANT CHANGE UP (ref 10.3–14.5)
SODIUM SERPL-SCNC: 134 MMOL/L — LOW (ref 135–145)
WBC # BLD: 7.53 K/UL — SIGNIFICANT CHANGE UP (ref 3.8–10.5)
WBC # FLD AUTO: 7.53 K/UL — SIGNIFICANT CHANGE UP (ref 3.8–10.5)

## 2022-02-17 PROCEDURE — 99232 SBSQ HOSP IP/OBS MODERATE 35: CPT

## 2022-02-17 RX ADMIN — POLYETHYLENE GLYCOL 3350 17 GRAM(S): 17 POWDER, FOR SOLUTION ORAL at 05:42

## 2022-02-17 RX ADMIN — ATORVASTATIN CALCIUM 20 MILLIGRAM(S): 80 TABLET, FILM COATED ORAL at 21:23

## 2022-02-17 RX ADMIN — SODIUM CHLORIDE 1 GRAM(S): 9 INJECTION INTRAMUSCULAR; INTRAVENOUS; SUBCUTANEOUS at 17:12

## 2022-02-17 RX ADMIN — Medication 4: at 11:14

## 2022-02-17 RX ADMIN — Medication 2: at 08:08

## 2022-02-17 RX ADMIN — Medication 5 UNIT(S): at 11:14

## 2022-02-17 RX ADMIN — Medication 500000 UNIT(S): at 14:16

## 2022-02-17 RX ADMIN — ENOXAPARIN SODIUM 40 MILLIGRAM(S): 100 INJECTION SUBCUTANEOUS at 11:14

## 2022-02-17 RX ADMIN — Medication 500000 UNIT(S): at 21:23

## 2022-02-17 RX ADMIN — Medication 500000 UNIT(S): at 05:42

## 2022-02-17 RX ADMIN — Medication 5 UNIT(S): at 08:08

## 2022-02-17 RX ADMIN — LACOSAMIDE 100 MILLIGRAM(S): 50 TABLET ORAL at 17:17

## 2022-02-17 RX ADMIN — Medication 650 MILLIGRAM(S): at 12:11

## 2022-02-17 RX ADMIN — SODIUM CHLORIDE 1 GRAM(S): 9 INJECTION INTRAMUSCULAR; INTRAVENOUS; SUBCUTANEOUS at 11:14

## 2022-02-17 RX ADMIN — PANTOPRAZOLE SODIUM 40 MILLIGRAM(S): 20 TABLET, DELAYED RELEASE ORAL at 05:42

## 2022-02-17 RX ADMIN — Medication 1 SPRAY(S): at 05:42

## 2022-02-17 RX ADMIN — Medication 2: at 16:34

## 2022-02-17 RX ADMIN — SODIUM CHLORIDE 1 GRAM(S): 9 INJECTION INTRAMUSCULAR; INTRAVENOUS; SUBCUTANEOUS at 05:42

## 2022-02-17 RX ADMIN — Medication 5 UNIT(S): at 16:33

## 2022-02-17 RX ADMIN — Medication 2 MILLIGRAM(S): at 05:42

## 2022-02-17 RX ADMIN — METFORMIN HYDROCHLORIDE 500 MILLIGRAM(S): 850 TABLET ORAL at 17:12

## 2022-02-17 RX ADMIN — Medication 1 SPRAY(S): at 17:12

## 2022-02-17 RX ADMIN — LACOSAMIDE 100 MILLIGRAM(S): 50 TABLET ORAL at 05:41

## 2022-02-17 RX ADMIN — METFORMIN HYDROCHLORIDE 500 MILLIGRAM(S): 850 TABLET ORAL at 05:42

## 2022-02-17 RX ADMIN — GABAPENTIN 100 MILLIGRAM(S): 400 CAPSULE ORAL at 21:22

## 2022-02-17 RX ADMIN — POLYETHYLENE GLYCOL 3350 17 GRAM(S): 17 POWDER, FOR SOLUTION ORAL at 17:13

## 2022-02-17 RX ADMIN — Medication 2 MILLIGRAM(S): at 17:12

## 2022-02-17 RX ADMIN — Medication 650 MILLIGRAM(S): at 11:17

## 2022-02-17 NOTE — PROGRESS NOTE ADULT - ASSESSMENT
ASSESSMENT/PLAN  66 year old male with PMH of  HTN, DM2, and known brain mass. He was in the [place] for vacation in early January, when he was noted to be confused and not answering appropriately, so an MRI was done on Jan 7 showed L frontal enhancing mass with significant edema. He presented to [facility] on 1/24 for further evaluation and repeat CTH which  showed significant vasogenic edema and 1.5cm MLS. He underwent 1/27/22 for left sided craniotomy for tumor resection with gleolan. Post-op course was complicated by possible seizures with EEG negative, hyponatermia with salt tabs added to regimen. 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 He was admitted to [facility] but tested positive for covid. He was asymptomatic but trasnferred to medical floor for quarantine. His subsequent covid swabs came back negative. Patient was deemed stable to return to [facility]  on 2/11/22. Patient  with gait Instability, ADL impairments and Functional impairments.    #Left frontal tumor s/p craniotomy for resection with right sided drift, Gait Instability, ADL impairments and Functional impairments  - Continue Comprehensive Rehab Program of PT/OT/SLP  - Monitor incision -staples removed 2/14 - tolerated well  -Continue Vimpat 100mg BID for seizure prophylaxis  -Continue Decadron slow taper- now on 2mg Q12h - to maintain until follow up with neurosurgery   -Will require follow up with neuro-oncology     #Muscular chest pain  - Not cardiac in nature  - No associated SOB or tachycardia  - Start Gabapentin 100mg at bedtime    #Hyponatremia  -Continue salt tabs 1G Q6h  - monitor NA  - Na 136 (2/14)    #HTN  -Home med: lisinopril 5mg daily  -BP controlled     #HLD   -Continue atorvastatin 20mg daily    #Type 2 DM  - A1c is 10.7 (1/25)  -FS and ISS  -Continue Lantus 16 units at bedtime  -Continue metformin 500mg BID     #Pain control  - Tylenol PRN    #Covid 19 - false positive   - positive on 2/8- likely false positive  - Negative  2/9 and 2/10  -Continues to be asymptomatic    #GI/Bowel Mgmt   - Continue Senna at bedtime   - Miralax BID    #Bladder management  - Continue to monitor PVR q 8 hours (SC if > 400)  -Monitor UO    #DVT  - Lovenox  - SCDs  - TEDs     #Skin:  -Incisional wound care per nursing. Surgical staples removed, wound intact    FEN   - Diet -regular with thin liquids [CCHO, DASH/TLC]    -Passed MBS 2/16    - Dysphagia  SLP - evaluation and treatment        Outpatient Follow-up (Specialty/Name of physician):  Parker Gupta)  Neurosurgery  [address] [place] [address]  Phone: [phone]  Fax: [phone]  Follow Up Time:     Matthew Hill)  Neurology  [facility], [address] [place] [address]  Phone: [phone]  Fax: [phone]  Follow Up Time:     Kristopher Guevara  RADIATION ONCOLOGY  [address] [place] [address] A [place] [address]  Phone: [phone]  Fax: [phone]    TEAM MEETING 2/16/22  SW:  moving in with daughter, family training needed  OT: Mod I for eating/grooming, Sv bathing and transfers, min for toileting  PT: SV for transfers, 100' amb no device with SV, 12 stairs with SV  SLP: MBS passed for regular with thins, delayed emptying may need GI as outpatient  barriers: cognition, comprehension, needs 24/7 supervision, impulsive   goals: Mod I for adls and transfers, SV for ambulation  EDOD: Home with 24/7 2/25

## 2022-02-17 NOTE — CHART NOTE - NSCHARTNOTEFT_GEN_A_CORE
Assessment:   Patient seen for: f/u     Source: [x] medical review, [x] patient    Factors impacting intake: [ ] none [ ] nausea  [ ] vomiting [ ] diarrhea [ ] constipation  [ ]chewing problems   [x] swallowing: pain when swallowing    Intake: 75%    Diet Prescription: Diet, Regular:   Consistent Carbohydrate {No Snacks}  Supplement Feeding Modality:  Oral  Glucerna Shake Cans or Servings Per Day:  1       Frequency:  Daily (02-16-22 @ 10:07)      Current Weight: Weight (kg): 2/16/22: 166 Lbs    74.7 Kg 164.3 Lbs (02-11 @ 16:09)  % Weight Change: stable wts    Pt reports good appetite. PO intake 75%. Pt feed self, reports no chewing difficulties, c/o swallowing difficulties, reports pain when swallowing, speaking, or coughing. Denies N/V/C/D. Last BM: 02/17. Pt w/ hx of DM, 01/25/22 HgA1C 10.7, high values may due to steroids tx. Therapeutic Diet Education provided verbally and in written form. Spoke about identifying CHO at each meal, keep a consistent CHO intake at each meal, suggested to pair CHO w/ protein at each meal, healthy plate pattern explained, recommend to maintain a physical activity daily as medically prescribed. Pt was agreeable w/ recommendations. Dietitian is available to continue w/ Diabetes education PRN.     Pertinent Medications: MEDICATIONS  (STANDING):  atorvastatin 20 milliGRAM(s) Oral at bedtime  dexAMETHasone     Tablet 2 milliGRAM(s) Oral two times a day  dextrose 40% Gel 15 Gram(s) Oral once  dextrose 5%. 1000 milliLiter(s) (50 mL/Hr) IV Continuous <Continuous>  dextrose 5%. 1000 milliLiter(s) (100 mL/Hr) IV Continuous <Continuous>  dextrose 50% Injectable 12.5 Gram(s) IV Push once  dextrose 50% Injectable 25 Gram(s) IV Push once  dextrose 50% Injectable 25 Gram(s) IV Push once  enoxaparin Injectable 40 milliGRAM(s) SubCutaneous daily  fluticasone propionate 50 MICROgram(s)/spray Nasal Spray 1 Spray(s) Both Nostrils two times a day  gabapentin 100 milliGRAM(s) Oral at bedtime  glucagon  Injectable 1 milliGRAM(s) IntraMuscular once  insulin glargine Injectable (LANTUS) 16 Unit(s) SubCutaneous at bedtime  insulin lispro (ADMELOG) corrective regimen sliding scale   SubCutaneous three times a day before meals  insulin lispro Injectable (ADMELOG) 5 Unit(s) SubCutaneous three times a day before meals  lacosamide Solution 100 milliGRAM(s) Oral two times a day  metFORMIN 500 milliGRAM(s) Oral two times a day  nystatin    Suspension 335354 Unit(s) Oral three times a day  pantoprazole    Tablet 40 milliGRAM(s) Oral before breakfast  polyethylene glycol 3350 17 Gram(s) Oral every 12 hours  senna 2 Tablet(s) Oral at bedtime  sodium chloride 1 Gram(s) Oral every 6 hours    MEDICATIONS  (PRN):  acetaminophen     Tablet .. 650 milliGRAM(s) Oral every 6 hours PRN Temp greater or equal to 38C (100.4F), Mild Pain (1 - 3)  benzocaine 15 mG/menthol 3.6 mG Lozenge 1 Lozenge Oral every 4 hours PRN Sore Throat    Pertinent Labs: 02-17 Na134 mmol/L<L> Glu 196 mg/dL<H> K+ 4.3 mmol/L Cr  0.90 mg/dL BUN 18 mg/dL 02-17 Alb 2.7 g/dL<L>     CAPILLARY BLOOD GLUCOSE      POCT Blood Glucose.: 224 mg/dL (17 Feb 2022 11:13)  POCT Blood Glucose.: 192 mg/dL (17 Feb 2022 08:07)  POCT Blood Glucose.: 127 mg/dL (16 Feb 2022 21:51)  POCT Blood Glucose.: 171 mg/dL (16 Feb 2022 16:22)      Skin: WDL except Sx incision on head    Edema: None     Estimated Needs:   [x] no change since previous assessment    Previous Nutrition Diagnosis:   [x] Malnutrition Severe    Nutrition Diagnosis is [x] ongoing    New Nutrition Diagnosis: [x] Lack of Food & knowledge diet related r/t lack of previous exposure to DM therapeutic diet education, AEB pt was no able to ID groups of foods w/ CHO content.       Interventions:   Recommend  [x] Continue with current diet: Consistent Carbohydrates diet (no snacks)  [x] Nutrition Supplement: Glucerna shake 1 x day (per can 200 ji, 10 g protein)   [x] Nutrition Support: Honor pt's food preferences as feasible with prescribed diet.   [x] Obtain and record PO intake and weights daily   [x] Other: Continue with Therapeutic Diabetes Diet education at f/u interview.   [x] Suggest SLP swallowing evaluation     Monitoring and Evaluation:   Continue to monitor Nutritional intake, Tolerance to diet prescription, weights, labs, skin integrity.  other:  RD remains available upon request and will follow up per protocol.

## 2022-02-17 NOTE — PROGRESS NOTE ADULT - ASSESSMENT
65 y/o man with PMH of  HTN, DM2, and recent discovery of brain Glioblastoma s/p 1/27/22 for left sided craniotomy. Post-op course was complicated by possible hyponatermia with salt tabs added to regimen. 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 He was admitted to [facility] for functional and gair impairment     Functional and gait instability:  - C/w PT/OT per primary team     Left frontal GBM s/p craniotomy for resection with right sided drift  -Continue Vimpat 100mg BID for seizure prophylaxis  -Continue Decadron slow taper- now on 2mg Q12h - to maintain until follow up with neurosurgery   -Will require follow up with neuro-oncology     Hyponatremia  -Continue fluid restriction  -Continue salt tabs 1G Q6h    HTN  -lisinopril has been held due to soft BP     HLD   -Continue atorvastatin 20mg daily    Type 2 DM uncontrolled  - A1c is 10.7  -FS and ISS  -Continue Lantus 16 units at bedtime + premeal 5units + Metformin 500mg bid. Titrate as tolerated    DVT PPX  - Lovenox  - SCDs  - TEDs

## 2022-02-17 NOTE — PROGRESS NOTE ADULT - SUBJECTIVE AND OBJECTIVE BOX
CHIEF COMPLAINT: no HA, no new complaints       HISTORY OF PRESENT ILLNESS    This is a 65 YO male with PMH of  HTN, DM2, and known brain mass. He was in the [place] for vacation in early January, when pt was noted to be confused and not answering appropriately, so an MRI was done on Jan 7 showed L frontal enhancing mass with significant edema. He presented to [facility] on 1/24 for further evaluation and repeat CTH showed significant vasogenic edema and 1.5cm MLS. He underwent 1/27/22 for left sided craniotomy for tumor resection with gleolan.  Pt was started on decadron and Vimpat also.  Post op course was complicated by possible seizures with EEG negative. Hyponatermia with salt tabs added to regimen. 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 He was evaluated by PMR and was recommended for acute inpatient rehab. He was admitted to [facility] on 02/08/22, however COVID test came back positive. COVID tests from 02/04 and 02/06/22 were negative. Patient remains asymptomatic.     COVID swab from 2/9/2022 and 2/10/2022 are both negative. Per medical team, he does not need isolation at this time. COVID swab from 2/8/2022 was likely a false positive. He was seen by ENT for hoarseness - vocal cords mobile but with incomplete closure and Continue with speech and swallow therapy. Follow up with ENT/Laryngology, Dr. Graham Bergeron [phone] 2 weeks after discharge. Patient stable to return to [facility] IR[facility]  on 2/11/22.       (11 Feb 2022 15:12)        PAST MEDICAL & SURGICAL HISTORY:  Diabetes mellitus    Hypertension    Glioblastoma  just diagnosed - 1/22/22    S/P craniotomy         REVIEW OF SYMPTOMS  [X] Constitutional WNL     [X] Cardio WNL            [X] Resp WNL           [X] GI WNL                          [X]  WNL                   [X] Heme WNL              [X] Endo WNL                     [X] Skin WNL                 [X] MSK WNL            [X] Neuro WNL                   [X] Cognitive WNL        [X] Psych WNL      VITALS  Vital Signs Last 24 Hrs  T(C): 36.7 (17 Feb 2022 07:35), Max: 36.9 (16 Feb 2022 20:18)  T(F): 98.1 (17 Feb 2022 07:35), Max: 98.4 (16 Feb 2022 20:18)  HR: 84 (17 Feb 2022 07:35) (72 - 84)  BP: 107/71 (17 Feb 2022 07:35) (97/62 - 107/71)  BP(mean): --  RR: 16 (17 Feb 2022 07:35) (15 - 16)  SpO2: 96% (17 Feb 2022 07:35) (95% - 96%)      PHYSICAL EXAM  Constitutional - NAD, Comfortable  HEENT - NCAT, EOMI  Neck - Supple, No limited ROM  Chest - CTA bilaterally  Cardiovascular -, S1S2, No murmurs  Abdomen -Soft, NTND  Extremities - No C/C/E, No calf tenderness   Neurologic Exam -no new focal deficits                        RECENT LABS                        12.2   7.53  )-----------( 145      ( 17 Feb 2022 07:45 )             36.9     02-17    134<L>  |  99  |  18  ----------------------------<  196<H>  4.3   |  26  |  0.90    Ca    9.1      17 Feb 2022 07:45    TPro  7.0  /  Alb  2.7<L>  /  TBili  0.7  /  DBili  x   /  AST  6<L>  /  ALT  18  /  AlkPhos  76  02-17      LIVER FUNCTIONS - ( 17 Feb 2022 07:45 )  Alb: 2.7 g/dL / Pro: 7.0 g/dL / ALK PHOS: 76 U/L / ALT: 18 U/L / AST: 6 U/L / GGT: x                 CURRENT MEDICATIONS  MEDICATIONS  (STANDING):  atorvastatin 20 milliGRAM(s) Oral at bedtime  dexAMETHasone     Tablet 2 milliGRAM(s) Oral two times a day  dextrose 40% Gel 15 Gram(s) Oral once  dextrose 5%. 1000 milliLiter(s) (50 mL/Hr) IV Continuous <Continuous>  dextrose 5%. 1000 milliLiter(s) (100 mL/Hr) IV Continuous <Continuous>  dextrose 50% Injectable 12.5 Gram(s) IV Push once  dextrose 50% Injectable 25 Gram(s) IV Push once  dextrose 50% Injectable 25 Gram(s) IV Push once  enoxaparin Injectable 40 milliGRAM(s) SubCutaneous daily  fluticasone propionate 50 MICROgram(s)/spray Nasal Spray 1 Spray(s) Both Nostrils two times a day  gabapentin 100 milliGRAM(s) Oral at bedtime  glucagon  Injectable 1 milliGRAM(s) IntraMuscular once  insulin glargine Injectable (LANTUS) 16 Unit(s) SubCutaneous at bedtime  insulin lispro (ADMELOG) corrective regimen sliding scale   SubCutaneous three times a day before meals  insulin lispro Injectable (ADMELOG) 5 Unit(s) SubCutaneous three times a day before meals  lacosamide Solution 100 milliGRAM(s) Oral two times a day  metFORMIN 500 milliGRAM(s) Oral two times a day  nystatin    Suspension 001099 Unit(s) Oral three times a day  pantoprazole    Tablet 40 milliGRAM(s) Oral before breakfast  polyethylene glycol 3350 17 Gram(s) Oral every 12 hours  senna 2 Tablet(s) Oral at bedtime  sodium chloride 1 Gram(s) Oral every 6 hours    MEDICATIONS  (PRN):  acetaminophen     Tablet .. 650 milliGRAM(s) Oral every 6 hours PRN Temp greater or equal to 38C (100.4F), Mild Pain (1 - 3)  benzocaine 15 mG/menthol 3.6 mG Lozenge 1 Lozenge Oral every 4 hours PRN Sore Throat

## 2022-02-17 NOTE — PROGRESS NOTE ADULT - SUBJECTIVE AND OBJECTIVE BOX
Patient is a 66y old  Male who presents with a chief complaint of Gliosarcoma (16 Feb 2022 11:10)      SUBJECTIVE / OVERNIGHT EVENTS:  Pt seen and examined at bedside. No acute events overnight.    Allergies    No Known Allergies    Intolerances        MEDICATIONS  (STANDING):  atorvastatin 20 milliGRAM(s) Oral at bedtime  dexAMETHasone     Tablet 2 milliGRAM(s) Oral two times a day  dextrose 40% Gel 15 Gram(s) Oral once  dextrose 5%. 1000 milliLiter(s) (50 mL/Hr) IV Continuous <Continuous>  dextrose 5%. 1000 milliLiter(s) (100 mL/Hr) IV Continuous <Continuous>  dextrose 50% Injectable 25 Gram(s) IV Push once  dextrose 50% Injectable 12.5 Gram(s) IV Push once  dextrose 50% Injectable 25 Gram(s) IV Push once  enoxaparin Injectable 40 milliGRAM(s) SubCutaneous daily  fluticasone propionate 50 MICROgram(s)/spray Nasal Spray 1 Spray(s) Both Nostrils two times a day  gabapentin 100 milliGRAM(s) Oral at bedtime  glucagon  Injectable 1 milliGRAM(s) IntraMuscular once  insulin glargine Injectable (LANTUS) 16 Unit(s) SubCutaneous at bedtime  insulin lispro (ADMELOG) corrective regimen sliding scale   SubCutaneous three times a day before meals  insulin lispro Injectable (ADMELOG) 5 Unit(s) SubCutaneous three times a day before meals  lacosamide Solution 100 milliGRAM(s) Oral two times a day  metFORMIN 500 milliGRAM(s) Oral two times a day  nystatin    Suspension 281852 Unit(s) Oral three times a day  pantoprazole    Tablet 40 milliGRAM(s) Oral before breakfast  polyethylene glycol 3350 17 Gram(s) Oral every 12 hours  senna 2 Tablet(s) Oral at bedtime  sodium chloride 1 Gram(s) Oral every 6 hours    MEDICATIONS  (PRN):  acetaminophen     Tablet .. 650 milliGRAM(s) Oral every 6 hours PRN Temp greater or equal to 38C (100.4F), Mild Pain (1 - 3)  benzocaine 15 mG/menthol 3.6 mG Lozenge 1 Lozenge Oral every 4 hours PRN Sore Throat      Vital Signs Last 24 Hrs  T(C): 36.7 (17 Feb 2022 07:35), Max: 36.9 (16 Feb 2022 20:18)  T(F): 98.1 (17 Feb 2022 07:35), Max: 98.4 (16 Feb 2022 20:18)  HR: 84 (17 Feb 2022 07:35) (72 - 84)  BP: 107/71 (17 Feb 2022 07:35) (97/62 - 107/71)  BP(mean): --  RR: 16 (17 Feb 2022 07:35) (15 - 16)  SpO2: 96% (17 Feb 2022 07:35) (95% - 96%)  CAPILLARY BLOOD GLUCOSE      POCT Blood Glucose.: 192 mg/dL (17 Feb 2022 08:07)  POCT Blood Glucose.: 127 mg/dL (16 Feb 2022 21:51)  POCT Blood Glucose.: 171 mg/dL (16 Feb 2022 16:22)  POCT Blood Glucose.: 251 mg/dL (16 Feb 2022 11:07)    I&O's Summary    16 Feb 2022 07:01  -  17 Feb 2022 07:00  --------------------------------------------------------  IN: 480 mL / OUT: 700 mL / NET: -220 mL        PHYSICAL EXAM:  GENERAL: NAD, well-developed  HEAD:  Surgical incision site c/d/i s/p staple removal  NECK: Supple, No JVD  CHEST/LUNG: Clear to auscultation bilaterally; No wheeze, nonlabored breathing  HEART: Regular rate and rhythm; No murmurs, rubs, or gallops  ABDOMEN: Soft, Nontender, Nondistended; Bowel sounds present  EXTREMITIES:  2+ Peripheral Pulses, No clubbing, cyanosis, or edema  NEUROLOGY: AAOx3, non-focal  PSYCH: calm, appropriate mood    LABS:                        12.2   7.53  )-----------( 145      ( 17 Feb 2022 07:45 )             36.9     02-17    134<L>  |  99  |  18  ----------------------------<  196<H>  4.3   |  26  |  0.90    Ca    9.1      17 Feb 2022 07:45    TPro  7.0  /  Alb  2.7<L>  /  TBili  0.7  /  DBili  x   /  AST  6<L>  /  ALT  18  /  AlkPhos  76  02-17              RADIOLOGY & ADDITIONAL TESTS:  Results Reviewed:   Imaging Personally Reviewed:  Electrocardiogram Personally Reviewed:    COORDINATION OF CARE:  Care Discussed with Consultants/Other Providers [Y/N]:  Prior or Outpatient Records Reviewed [Y/N]:

## 2022-02-18 ENCOUNTER — TRANSCRIPTION ENCOUNTER (OUTPATIENT)
Age: 67
End: 2022-02-18

## 2022-02-18 LAB
GLUCOSE BLDC GLUCOMTR-MCNC: 143 MG/DL — HIGH (ref 70–99)
GLUCOSE BLDC GLUCOMTR-MCNC: 152 MG/DL — HIGH (ref 70–99)
GLUCOSE BLDC GLUCOMTR-MCNC: 358 MG/DL — HIGH (ref 70–99)
GLUCOSE BLDC GLUCOMTR-MCNC: 93 MG/DL — SIGNIFICANT CHANGE UP (ref 70–99)
HCT VFR BLD CALC: 34.5 % — LOW (ref 39–50)
HGB BLD-MCNC: 11.6 G/DL — LOW (ref 13–17)
MCHC RBC-ENTMCNC: 31.8 PG — SIGNIFICANT CHANGE UP (ref 27–34)
MCHC RBC-ENTMCNC: 33.6 GM/DL — SIGNIFICANT CHANGE UP (ref 32–36)
MCV RBC AUTO: 94.5 FL — SIGNIFICANT CHANGE UP (ref 80–100)
NRBC # BLD: 0 /100 WBCS — SIGNIFICANT CHANGE UP (ref 0–0)
PLATELET # BLD AUTO: 114 K/UL — LOW (ref 150–400)
RBC # BLD: 3.65 M/UL — LOW (ref 4.2–5.8)
RBC # FLD: 12.9 % — SIGNIFICANT CHANGE UP (ref 10.3–14.5)
SARS-COV-2 RNA SPEC QL NAA+PROBE: SIGNIFICANT CHANGE UP
WBC # BLD: 6.97 K/UL — SIGNIFICANT CHANGE UP (ref 3.8–10.5)
WBC # FLD AUTO: 6.97 K/UL — SIGNIFICANT CHANGE UP (ref 3.8–10.5)

## 2022-02-18 PROCEDURE — 99232 SBSQ HOSP IP/OBS MODERATE 35: CPT

## 2022-02-18 PROCEDURE — 99233 SBSQ HOSP IP/OBS HIGH 50: CPT

## 2022-02-18 RX ORDER — ESCITALOPRAM OXALATE 10 MG/1
5 TABLET, FILM COATED ORAL DAILY
Refills: 0 | Status: DISCONTINUED | OUTPATIENT
Start: 2022-02-18 | End: 2022-02-24

## 2022-02-18 RX ADMIN — ENOXAPARIN SODIUM 40 MILLIGRAM(S): 100 INJECTION SUBCUTANEOUS at 11:15

## 2022-02-18 RX ADMIN — ATORVASTATIN CALCIUM 20 MILLIGRAM(S): 80 TABLET, FILM COATED ORAL at 21:21

## 2022-02-18 RX ADMIN — SODIUM CHLORIDE 1 GRAM(S): 9 INJECTION INTRAMUSCULAR; INTRAVENOUS; SUBCUTANEOUS at 06:25

## 2022-02-18 RX ADMIN — LACOSAMIDE 100 MILLIGRAM(S): 50 TABLET ORAL at 17:20

## 2022-02-18 RX ADMIN — SODIUM CHLORIDE 1 GRAM(S): 9 INJECTION INTRAMUSCULAR; INTRAVENOUS; SUBCUTANEOUS at 11:16

## 2022-02-18 RX ADMIN — Medication 10: at 11:16

## 2022-02-18 RX ADMIN — PANTOPRAZOLE SODIUM 40 MILLIGRAM(S): 20 TABLET, DELAYED RELEASE ORAL at 06:25

## 2022-02-18 RX ADMIN — Medication 5 UNIT(S): at 07:22

## 2022-02-18 RX ADMIN — GABAPENTIN 100 MILLIGRAM(S): 400 CAPSULE ORAL at 21:21

## 2022-02-18 RX ADMIN — LACOSAMIDE 100 MILLIGRAM(S): 50 TABLET ORAL at 06:25

## 2022-02-18 RX ADMIN — INSULIN GLARGINE 16 UNIT(S): 100 INJECTION, SOLUTION SUBCUTANEOUS at 21:22

## 2022-02-18 RX ADMIN — Medication 2 MILLIGRAM(S): at 17:05

## 2022-02-18 RX ADMIN — Medication 2 MILLIGRAM(S): at 06:25

## 2022-02-18 RX ADMIN — Medication 1 SPRAY(S): at 06:25

## 2022-02-18 RX ADMIN — SODIUM CHLORIDE 1 GRAM(S): 9 INJECTION INTRAMUSCULAR; INTRAVENOUS; SUBCUTANEOUS at 17:05

## 2022-02-18 RX ADMIN — METFORMIN HYDROCHLORIDE 500 MILLIGRAM(S): 850 TABLET ORAL at 17:05

## 2022-02-18 RX ADMIN — SENNA PLUS 2 TABLET(S): 8.6 TABLET ORAL at 21:22

## 2022-02-18 RX ADMIN — Medication 1 SPRAY(S): at 17:05

## 2022-02-18 RX ADMIN — Medication 500000 UNIT(S): at 06:25

## 2022-02-18 RX ADMIN — Medication 2: at 07:21

## 2022-02-18 RX ADMIN — Medication 5 UNIT(S): at 11:16

## 2022-02-18 RX ADMIN — ESCITALOPRAM OXALATE 5 MILLIGRAM(S): 10 TABLET, FILM COATED ORAL at 17:05

## 2022-02-18 RX ADMIN — SODIUM CHLORIDE 1 GRAM(S): 9 INJECTION INTRAMUSCULAR; INTRAVENOUS; SUBCUTANEOUS at 23:38

## 2022-02-18 RX ADMIN — METFORMIN HYDROCHLORIDE 500 MILLIGRAM(S): 850 TABLET ORAL at 07:21

## 2022-02-18 NOTE — PROGRESS NOTE ADULT - SUBJECTIVE AND OBJECTIVE BOX
Patient is a 66y old  Male who presents with a chief complaint of Glioblastoma (17 Feb 2022 11:23)      SUBJECTIVE / OVERNIGHT EVENTS:  Pt seen and examined at bedside. No acute events overnight.    Allergies    No Known Allergies    Intolerances        MEDICATIONS  (STANDING):  atorvastatin 20 milliGRAM(s) Oral at bedtime  dexAMETHasone     Tablet 2 milliGRAM(s) Oral two times a day  dextrose 40% Gel 15 Gram(s) Oral once  dextrose 5%. 1000 milliLiter(s) (50 mL/Hr) IV Continuous <Continuous>  dextrose 5%. 1000 milliLiter(s) (100 mL/Hr) IV Continuous <Continuous>  dextrose 50% Injectable 12.5 Gram(s) IV Push once  dextrose 50% Injectable 25 Gram(s) IV Push once  dextrose 50% Injectable 25 Gram(s) IV Push once  enoxaparin Injectable 40 milliGRAM(s) SubCutaneous daily  fluticasone propionate 50 MICROgram(s)/spray Nasal Spray 1 Spray(s) Both Nostrils two times a day  gabapentin 100 milliGRAM(s) Oral at bedtime  glucagon  Injectable 1 milliGRAM(s) IntraMuscular once  insulin glargine Injectable (LANTUS) 16 Unit(s) SubCutaneous at bedtime  insulin lispro (ADMELOG) corrective regimen sliding scale   SubCutaneous three times a day before meals  insulin lispro Injectable (ADMELOG) 5 Unit(s) SubCutaneous three times a day before meals  lacosamide Solution 100 milliGRAM(s) Oral two times a day  metFORMIN 500 milliGRAM(s) Oral two times a day  pantoprazole    Tablet 40 milliGRAM(s) Oral before breakfast  polyethylene glycol 3350 17 Gram(s) Oral every 12 hours  senna 2 Tablet(s) Oral at bedtime  sodium chloride 1 Gram(s) Oral every 6 hours    MEDICATIONS  (PRN):  acetaminophen     Tablet .. 650 milliGRAM(s) Oral every 6 hours PRN Temp greater or equal to 38C (100.4F), Mild Pain (1 - 3)  benzocaine 15 mG/menthol 3.6 mG Lozenge 1 Lozenge Oral every 4 hours PRN Sore Throat      Vital Signs Last 24 Hrs  T(C): 36.4 (18 Feb 2022 08:50), Max: 36.7 (17 Feb 2022 19:43)  T(F): 97.6 (18 Feb 2022 08:50), Max: 98 (17 Feb 2022 19:43)  HR: 75 (18 Feb 2022 08:50) (75 - 93)  BP: 117/77 (18 Feb 2022 08:50) (93/50 - 117/77)  BP(mean): --  RR: 16 (18 Feb 2022 08:50) (14 - 16)  SpO2: 94% (18 Feb 2022 08:50) (94% - 95%)  CAPILLARY BLOOD GLUCOSE      POCT Blood Glucose.: 152 mg/dL (18 Feb 2022 07:20)  POCT Blood Glucose.: 153 mg/dL (17 Feb 2022 22:42)  POCT Blood Glucose.: 96 mg/dL (17 Feb 2022 21:22)  POCT Blood Glucose.: 157 mg/dL (17 Feb 2022 16:30)  POCT Blood Glucose.: 224 mg/dL (17 Feb 2022 11:13)    I&O's Summary    17 Feb 2022 07:01  -  18 Feb 2022 07:00  --------------------------------------------------------  IN: 720 mL / OUT: 0 mL / NET: 720 mL        PHYSICAL EXAM:  GENERAL: NAD, well-developed  HEAD:  Surgical incision site c/d/i s/p staple removal  NECK: Supple, No JVD  CHEST/LUNG: Clear to auscultation bilaterally; No wheeze, nonlabored breathing  HEART: Regular rate and rhythm; No murmurs, rubs, or gallops  ABDOMEN: Soft, Nontender, Nondistended; Bowel sounds present  EXTREMITIES:  2+ Peripheral Pulses, No clubbing, cyanosis, or edema  NEUROLOGY: AAOx3, non-focal  PSYCH: calm, appropriate mood    LABS:                        11.6   6.97  )-----------( 114      ( 18 Feb 2022 07:30 )             34.5     02-17    134<L>  |  99  |  18  ----------------------------<  196<H>  4.3   |  26  |  0.90    Ca    9.1      17 Feb 2022 07:45    TPro  7.0  /  Alb  2.7<L>  /  TBili  0.7  /  DBili  x   /  AST  6<L>  /  ALT  18  /  AlkPhos  76  02-17              RADIOLOGY & ADDITIONAL TESTS:  Results Reviewed:   Imaging Personally Reviewed:  Electrocardiogram Personally Reviewed:    COORDINATION OF CARE:  Care Discussed with Consultants/Other Providers [Y/N]:  Prior or Outpatient Records Reviewed [Y/N]:

## 2022-02-18 NOTE — PROGRESS NOTE ADULT - ASSESSMENT
ASSESSMENT/PLAN  66 year old male with PMH of  HTN, DM2, and known brain mass. He was in the [place] for vacation in early January, when he was noted to be confused and not answering appropriately, so an MRI was done on Jan 7 showed L frontal enhancing mass with significant edema. He presented to [facility] on 1/24 for further evaluation and repeat CTH which  showed significant vasogenic edema and 1.5cm MLS. He underwent 1/27/22 for left sided craniotomy for tumor resection with gleolan. Post-op course was complicated by possible seizures with EEG negative, hyponatermia with salt tabs added to regimen. 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 He was admitted to [facility] but tested positive for covid. He was asymptomatic but trasnferred to medical floor for quarantine. His subsequent covid swabs came back negative. Patient was deemed stable to return to [facility]  on 2/11/22. Patient  with gait Instability, ADL impairments and Functional impairments.    #Left frontal tumor s/p craniotomy for resection with right sided drift, Gait Instability, ADL impairments and Functional impairments  - Continue Comprehensive Rehab Program of PT/OT/SLP  - Monitor incision -staples removed 2/14 - tolerated well  -Continue Vimpat 100mg BID for seizure prophylaxis  -Continue Decadron slow taper- now on 2mg Q12h - to maintain until follow up with neurosurgery   -Will require follow up with neuro-oncology     # depression  - start Lexapro  - Neuropsychology  input       #Hyponatremia  -Continue salt tabs 1G Q6h  - monitor NA  - Na 136 (2/14)    #HTN  -Home med: lisinopril 5mg daily  -BP controlled     #HLD   -Continue atorvastatin 20mg daily    #Type 2 DM  - A1c is 10.7 (1/25)  -FS and ISS  -Continue Lantus 16 units at bedtime  -Continue metformin 500mg BID     #Pain control  - Tylenol PRN    #Covid 19 - false positive   - positive on 2/8- likely false positive  - Negative  2/9 and 2/10  -Continues to be asymptomatic    #GI/Bowel Mgmt   - Continue Senna at bedtime   - Miralax BID    #Bladder management  - Continue to monitor PVR q 8 hours (SC if > 400)  -Monitor UO    #DVT  - Lovenox  - SCDs  - TEDs     #Skin:  -Incisional wound care per nursing. Surgical staples removed, wound intact    FEN   - Diet -regular with thin liquids [CCHO, DASH/TLC]    -Passed MBS 2/16    - Dysphagia  SLP - evaluation and treatment        Outpatient Follow-up (Specialty/Name of physician):  Parker Gupta)  Neurosurgery  [address] [place] [address]  Phone: [phone]  Fax: [phone]  Follow Up Time:     Matthew Hill)  Neurology  [facility], [address] [place] [address]  Phone: [phone]  Fax: [phone]  Follow Up Time:     Kristopher Guevara  RADIATION ONCOLOGY  [address] [place] [address] Entrance A [place] [address]  Phone: [phone]  Fax: [phone]    TEAM MEETING 2/16/22  SW:  moving in with daughter, family training needed  OT: Mod I for eating/grooming, Sv bathing and transfers, min for toileting  PT: SV for transfers, 100' amb no device with SV, 12 stairs with SV  SLP: MBS passed for regular with thins, delayed emptying may need GI as outpatient  barriers: cognition, comprehension, needs 24/7 supervision, impulsive   goals: Mod I for adls and transfers, SV for ambulation  EDOD: Home with 24/7 2/25    ASSESSMENT/PLAN  66 year old male with PMH of  HTN, DM2, and known brain mass. He was in the [place] for vacation in early January, when he was noted to be confused and not answering appropriately, so an MRI was done on Jan 7 showed L frontal enhancing mass with significant edema. He presented to [facility] on 1/24 for further evaluation and repeat CTH which  showed significant vasogenic edema and 1.5cm MLS. He underwent 1/27/22 for left sided craniotomy for tumor resection with gleolan. Post-op course was complicated by possible seizures with EEG negative, hyponatermia with salt tabs added to regimen. 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 He was admitted to [facility] but tested positive for covid. He was asymptomatic but trasnferred to medical floor for quarantine. His subsequent covid swabs came back negative. Patient was deemed stable to return to [facility]  on 2/11/22. Patient  with gait Instability, ADL impairments and Functional impairments.    #Left frontal tumor s/p craniotomy for resection with right sided drift, Gait Instability, ADL impairments and Functional impairments  - Continue Comprehensive Rehab Program of PT/OT/SLP  - Monitor incision -staples removed 2/14 - tolerated well  -Continue Vimpat 100mg BID for seizure prophylaxis  -Continue Decadron slow taper- now on 2mg Q12h - to maintain until follow up with neurosurgery   -Will require follow up with neuro-oncology     # depression  - start Lexapro  - Neuropsychology  input       #Hyponatremia  -Continue salt tabs 1G Q6h  - monitor NA  - Na 136 (2/14)    #HTN  -Home med: lisinopril 5mg daily  -BP controlled     #HLD   -Continue atorvastatin 20mg daily    #Type 2 DM  - A1c is 10.7 (1/25)  -FS and ISS  -Continue Lantus 16 units at bedtime  -Continue metformin 500mg BID     #Pain control  - Tylenol PRN    #Covid 19 - false positive   - positive on 2/8- likely false positive  - Negative  2/9 and 2/10  -Continues to be asymptomatic    #GI/Bowel Mgmt   - Continue Senna at bedtime   - Miralax BID    #Bladder management  - Continue to monitor PVR q 8 hours (SC if > 400)  -Monitor UO    #DVT  - Lovenox  - SCDs  - TEDs     #Skin:  -Incisional wound care per nursing. Surgical staples removed, wound intact    FEN   - Diet -regular with thin liquids [CCHO, DASH/TLC]    -Passed MBS 2/16    - Dysphagia  SLP - evaluation and treatment        Outpatient Follow-up (Specialty/Name of physician):  Parker Gupta)  Neurosurgery  [address] [place] [address]  Phone: [phone]  Fax: [phone]  Follow Up Time:     Matthew Hill)  Neurology  [facility], [address] [place] [address]  Phone: [phone]  Fax: [phone]  Follow Up Time:     Kristopher Guevara  RADIATION ONCOLOGY  [address] [place] [address] A - [place] [address]  Phone: [phone]  Fax: [phone]    TEAM MEETING 2/16/22  SW:  moving in with daughter, family training needed  OT: Mod I for eating/grooming, Sv bathing and transfers, min for toileting  PT: SV for transfers, 100' amb no device with SV, 12 stairs with SV  SLP: MBS passed for regular with thins, delayed emptying may need GI as outpatient  barriers: cognition, comprehension, needs 24/7 supervision, impulsive   goals: Mod I for adls and transfers, SV for ambulation  EDOD: Home with 24/7 2/25         Spoke with daughter Roxanne [phone] - patient's needs, prognosis and discharge plan discussed - she understands that patient will need  supervision and assistance at home after discharge

## 2022-02-18 NOTE — DISCHARGE NOTE NURSING/CASE MANAGEMENT/SOCIAL WORK - NSDCFUADDAPPT_GEN_ALL_CORE_FT
Appt scheduled with neurosurgeon Dr. Gupta  Wednesday 2/23/22 @ 3PM  [facility] [place] [address]  ([phone]) Please follow up with primary care doctor in 2 weeks.

## 2022-02-18 NOTE — DISCHARGE NOTE NURSING/CASE MANAGEMENT/SOCIAL WORK - PATIENT PORTAL LINK FT
You can access the FollowMyHealth Patient Portal offered by [facility] by registering at the following website: [email or web address]. By joining [email or web address]’s FollowMyHealth portal, you will also be able to view your health information using other applications (apps) compatible with our system.

## 2022-02-18 NOTE — DISCHARGE NOTE NURSING/CASE MANAGEMENT/SOCIAL WORK - NSDCPEFALRISK_GEN_ALL_CORE
For information on Fall & Injury Prevention, visit: [email or web address] OR  [email or web address] OR  https://www.cdc.gov/steadi/patient.html

## 2022-02-18 NOTE — PROGRESS NOTE ADULT - ASSESSMENT
67 y/o man with PMH of  HTN, DM2, and recent discovery of brain Glioblastoma s/p 1/27/22 for left sided craniotomy. Post-op course was complicated by possible hyponatermia with salt tabs added to regimen. 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 He was admitted to [facility] for functional and gair impairment     Functional and gait instability:  - C/w PT/OT per primary team     Left frontal GBM s/p craniotomy for resection with right sided drift  -Continue Vimpat 100mg BID for seizure prophylaxis  -Continue Decadron slow taper- now on 2mg Q12h - to maintain until follow up with neurosurgery   -Will require follow up with neuro-oncology     Hyponatremia  -Continue fluid restriction  -Continue salt tabs 1G Q6h    HTN  -lisinopril has been held due to soft BP     HLD   -Continue atorvastatin 20mg daily    Type 2 DM uncontrolled  - A1c is 10.7  -At this time will Continue Lantus 16 units QHS + premeal 5units + Metformin 500mg bid. Will eventually titrate  down insulin as demand will decrease with increase insulin sensitivity due to Metformin. But currently still having episodes of hyperglycemia  -FS and ISS      DVT PPX  - Lovenox  - SCDs  - TEDs

## 2022-02-18 NOTE — DISCHARGE NOTE NURSING/CASE MANAGEMENT/SOCIAL WORK - NSSCCONTNUM_GEN_ALL_CORE
[phone] NOTE: Please follow up with them directly to coordinate home visit. [phone] NOTE: Please follow up with them directly to coordinate home visit. option 5

## 2022-02-18 NOTE — PROGRESS NOTE ADULT - SUBJECTIVE AND OBJECTIVE BOX
CHIEF COMPLAINT:  depressed, refusing therapy       HISTORY OF PRESENT ILLNESS    This is a 65 YO male with PMH of  HTN, DM2, and known brain mass. He was in the [place] for vacation in early January, when pt was noted to be confused and not answering appropriately, so an MRI was done on Jan 7 showed L frontal enhancing mass with significant edema. He presented to [facility] on 1/24 for further evaluation and repeat CTH showed significant vasogenic edema and 1.5cm MLS. He underwent 1/27/22 for left sided craniotomy for tumor resection with gleolan.  Pt was started on decadron and Vimpat also.  Post op course was complicated by possible seizures with EEG negative. Hyponatermia with salt tabs added to regimen. 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 He was evaluated by PMR and was recommended for acute inpatient rehab. He was admitted to [facility] on 02/08/22, however COVID test came back positive. COVID tests from 02/04 and 02/06/22 were negative. Patient remains asymptomatic.     COVID swab from 2/9/2022 and 2/10/2022 are both negative. Per medical team, he does not need isolation at this time. COVID swab from 2/8/2022 was likely a false positive. He was seen by ENT for hoarseness - vocal cords mobile but with incomplete closure and Continue with speech and swallow therapy. Follow up with ENT/Laryngology, Dr. Graham Bergeron [phone] 2 weeks after discharge. Patient stable to return to [facility]  on 2/11/22.       (11 Feb 2022 15:12)        PAST MEDICAL & SURGICAL HISTORY:  Diabetes mellitus    Hypertension    Glioblastoma  just diagnosed - 1/22/22    S/P craniotomy         REVIEW OF SYMPTOMS  [X] Constitutional WNL     [X] Cardio WNL            [X] Resp WNL           [X] GI WNL                          [X]  WNL                   [X] Heme WNL              [X] Endo WNL                     [X] Skin WNL                 [X] MSK WNL            [X] Neuro WNL                   [X] Cognitive WNL        [X] Psych WNL      VITALS  Vital Signs Last 24 Hrs  T(C): 36.4 (18 Feb 2022 08:50), Max: 36.7 (17 Feb 2022 19:43)  T(F): 97.6 (18 Feb 2022 08:50), Max: 98 (17 Feb 2022 19:43)  HR: 75 (18 Feb 2022 08:50) (75 - 93)  BP: 117/77 (18 Feb 2022 08:50) (93/50 - 117/77)  BP(mean): --  RR: 16 (18 Feb 2022 08:50) (14 - 16)  SpO2: 94% (18 Feb 2022 08:50) (94% - 95%)      PHYSICAL EXAM  Constitutional - NAD, Comfortable  HEENT - NCAT, EOMI  Neck - Supple, No limited ROM  Chest - CTA bilaterally  Cardiovascular - RRR, S1S2  Abdomen -  Soft, NTND  Extremities - No C/C/E, No calf tenderness   Neurologic Exam -  no new  focal deficits                        RECENT LABS                        11.6   6.97  )-----------( 114      ( 18 Feb 2022 07:30 )             34.5     02-17    134<L>  |  99  |  18  ----------------------------<  196<H>  4.3   |  26  |  0.90    Ca    9.1      17 Feb 2022 07:45    TPro  7.0  /  Alb  2.7<L>  /  TBili  0.7  /  DBili  x   /  AST  6<L>  /  ALT  18  /  AlkPhos  76  02-17      LIVER FUNCTIONS - ( 17 Feb 2022 07:45 )  Alb: 2.7 g/dL / Pro: 7.0 g/dL / ALK PHOS: 76 U/L / ALT: 18 U/L / AST: 6 U/L / GGT: x               CURRENT MEDICATIONS  MEDICATIONS  (STANDING):  atorvastatin 20 milliGRAM(s) Oral at bedtime  dexAMETHasone     Tablet 2 milliGRAM(s) Oral two times a day  dextrose 40% Gel 15 Gram(s) Oral once  dextrose 5%. 1000 milliLiter(s) (100 mL/Hr) IV Continuous <Continuous>  dextrose 5%. 1000 milliLiter(s) (50 mL/Hr) IV Continuous <Continuous>  dextrose 50% Injectable 25 Gram(s) IV Push once  dextrose 50% Injectable 12.5 Gram(s) IV Push once  dextrose 50% Injectable 25 Gram(s) IV Push once  enoxaparin Injectable 40 milliGRAM(s) SubCutaneous daily  fluticasone propionate 50 MICROgram(s)/spray Nasal Spray 1 Spray(s) Both Nostrils two times a day  gabapentin 100 milliGRAM(s) Oral at bedtime  glucagon  Injectable 1 milliGRAM(s) IntraMuscular once  insulin glargine Injectable (LANTUS) 16 Unit(s) SubCutaneous at bedtime  insulin lispro (ADMELOG) corrective regimen sliding scale   SubCutaneous three times a day before meals  insulin lispro Injectable (ADMELOG) 5 Unit(s) SubCutaneous three times a day before meals  lacosamide Solution 100 milliGRAM(s) Oral two times a day  metFORMIN 500 milliGRAM(s) Oral two times a day  pantoprazole    Tablet 40 milliGRAM(s) Oral before breakfast  polyethylene glycol 3350 17 Gram(s) Oral every 12 hours  senna 2 Tablet(s) Oral at bedtime  sodium chloride 1 Gram(s) Oral every 6 hours    MEDICATIONS  (PRN):  acetaminophen     Tablet .. 650 milliGRAM(s) Oral every 6 hours PRN Temp greater or equal to 38C (100.4F), Mild Pain (1 - 3)  benzocaine 15 mG/menthol 3.6 mG Lozenge 1 Lozenge Oral every 4 hours PRN Sore Throat      ASSESSMENT & PLAN          GI/Bowel Management - Dulcolax PRN, Fleet PRN   Management - Toilet Q2  Skin - Turn Q2  Pain - Tylenol PRN  DVT PPX - TEDs, SCDs  Diet -     Continue comprehensive acute rehab program consisting of 3hrs/day of OT/PT and SLP.

## 2022-02-19 LAB
GLUCOSE BLDC GLUCOMTR-MCNC: 150 MG/DL — HIGH (ref 70–99)
GLUCOSE BLDC GLUCOMTR-MCNC: 165 MG/DL — HIGH (ref 70–99)
GLUCOSE BLDC GLUCOMTR-MCNC: 167 MG/DL — HIGH (ref 70–99)
GLUCOSE BLDC GLUCOMTR-MCNC: 261 MG/DL — HIGH (ref 70–99)

## 2022-02-19 PROCEDURE — 99232 SBSQ HOSP IP/OBS MODERATE 35: CPT

## 2022-02-19 RX ADMIN — Medication 5 UNIT(S): at 07:45

## 2022-02-19 RX ADMIN — ENOXAPARIN SODIUM 40 MILLIGRAM(S): 100 INJECTION SUBCUTANEOUS at 11:50

## 2022-02-19 RX ADMIN — Medication 5 UNIT(S): at 16:35

## 2022-02-19 RX ADMIN — SENNA PLUS 2 TABLET(S): 8.6 TABLET ORAL at 21:51

## 2022-02-19 RX ADMIN — METFORMIN HYDROCHLORIDE 500 MILLIGRAM(S): 850 TABLET ORAL at 17:49

## 2022-02-19 RX ADMIN — SODIUM CHLORIDE 1 GRAM(S): 9 INJECTION INTRAMUSCULAR; INTRAVENOUS; SUBCUTANEOUS at 23:39

## 2022-02-19 RX ADMIN — INSULIN GLARGINE 16 UNIT(S): 100 INJECTION, SOLUTION SUBCUTANEOUS at 21:51

## 2022-02-19 RX ADMIN — Medication 5 UNIT(S): at 11:50

## 2022-02-19 RX ADMIN — ESCITALOPRAM OXALATE 5 MILLIGRAM(S): 10 TABLET, FILM COATED ORAL at 11:50

## 2022-02-19 RX ADMIN — LACOSAMIDE 100 MILLIGRAM(S): 50 TABLET ORAL at 06:15

## 2022-02-19 RX ADMIN — PANTOPRAZOLE SODIUM 40 MILLIGRAM(S): 20 TABLET, DELAYED RELEASE ORAL at 06:15

## 2022-02-19 RX ADMIN — SODIUM CHLORIDE 1 GRAM(S): 9 INJECTION INTRAMUSCULAR; INTRAVENOUS; SUBCUTANEOUS at 17:49

## 2022-02-19 RX ADMIN — SODIUM CHLORIDE 1 GRAM(S): 9 INJECTION INTRAMUSCULAR; INTRAVENOUS; SUBCUTANEOUS at 11:50

## 2022-02-19 RX ADMIN — SODIUM CHLORIDE 1 GRAM(S): 9 INJECTION INTRAMUSCULAR; INTRAVENOUS; SUBCUTANEOUS at 06:15

## 2022-02-19 RX ADMIN — Medication 1 SPRAY(S): at 06:14

## 2022-02-19 RX ADMIN — Medication 2 MILLIGRAM(S): at 06:15

## 2022-02-19 RX ADMIN — Medication 2 MILLIGRAM(S): at 17:49

## 2022-02-19 RX ADMIN — Medication 1 SPRAY(S): at 20:17

## 2022-02-19 RX ADMIN — METFORMIN HYDROCHLORIDE 500 MILLIGRAM(S): 850 TABLET ORAL at 07:44

## 2022-02-19 RX ADMIN — LACOSAMIDE 100 MILLIGRAM(S): 50 TABLET ORAL at 17:49

## 2022-02-19 RX ADMIN — Medication 6: at 11:50

## 2022-02-19 RX ADMIN — Medication 2: at 07:44

## 2022-02-19 RX ADMIN — GABAPENTIN 100 MILLIGRAM(S): 400 CAPSULE ORAL at 21:51

## 2022-02-19 RX ADMIN — ATORVASTATIN CALCIUM 20 MILLIGRAM(S): 80 TABLET, FILM COATED ORAL at 21:51

## 2022-02-19 RX ADMIN — Medication 2: at 16:34

## 2022-02-19 NOTE — PROGRESS NOTE ADULT - ASSESSMENT
ASSESSMENT/PLAN  66 year old male with PMH of  HTN, DM2, and known brain mass. He was in the [place] for vacation in early January, when he was noted to be confused and not answering appropriately, so an MRI was done on Jan 7 showed L frontal enhancing mass with significant edema. He presented to [facility] on 1/24 for further evaluation and repeat CTH which  showed significant vasogenic edema and 1.5cm MLS. He underwent 1/27/22 for left sided craniotomy for tumor resection with gleolan. Post-op course was complicated by possible seizures with EEG negative, hyponatermia with salt tabs added to regimen. 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 He was admitted to [facility] but tested positive for covid. He was asymptomatic but trasnferred to medical floor for quarantine. His subsequent covid swabs came back negative. Patient was deemed stable to return to [facility]  on 2/11/22. Patient  with gait Instability, ADL impairments and Functional impairments.    #Left frontal tumor s/p craniotomy for resection with right sided drift, Gait Instability, ADL impairments and Functional impairments  - Continue Comprehensive Rehab Program of PT/OT/SLP  - Monitor incision -staples removed 2/14   -Continue Vimpat 100mg BID for seizure prophylaxis  -Continue Decadron slow taper- now on 2mg Q12h - to maintain until follow up with neurosurgery   -Will require follow up with neuro-oncology     # depression  -  Lexapro  - Neuropsychology  input       #Hyponatremia  -Continue salt tabs 1G Q6h  - monitor NA  - Na 136 (2/14), Na 134 (2/17)    #HTN  -Home med: lisinopril 5mg daily  -BP controlled     #HLD   -Continue atorvastatin 20mg daily    #Type 2 DM  - A1c is 10.7 (1/25)  -FS and ISS  -Continue Lantus 16 units at bedtime  -Continue metformin 500mg BID     #Pain control  - Tylenol PRN    #Covid 19 - false positive   - positive on 2/8- likely false positive  - Negative  2/9 and 2/10  -Continues to be asymptomatic    #GI/Bowel Mgmt   - Continue Senna at bedtime   - Miralax BID    #Bladder management  - Continue to monitor PVR q 8 hours (SC if > 400)  -Monitor UO    #DVT  - Lovenox  - SCDs  - TEDs     #Skin:  -Incisional wound care per nursing. Surgical staples removed, wound intact    FEN   - Diet -regular with thin liquids [CCHO, DASH/TLC]    -Passed MBS 2/16    - Dysphagia  SLP     Labs: CBC, CMP 2/21        Outpatient Follow-up (Specialty/Name of physician):  Parker Gupta)  Neurosurgery  [address] [place] [address]  Phone: [phone]  Fax: [phone]  Follow Up Time:     Matthew Hill)  Neurology  [facility], [address] [place] [address]  Phone: [phone]  Fax: [phone]  Follow Up Time:     Kristopher Guevara  RADIATION ONCOLOGY  [address] [place] [address] A [place] [address]  Phone: [phone]  Fax: [phone]    TEAM MEETING 2/16/22  SW:  moving in with daughter, family training needed  OT: Mod I for eating/grooming, Sv bathing and transfers, min for toileting  PT: SV for transfers, 100' amb no device with SV, 12 stairs with SV  SLP: MBS passed for regular with thins, delayed emptying may need GI as outpatient  barriers: cognition, comprehension, needs 24/7 supervision, impulsive   goals: Mod I for adls and transfers, SV for ambulation  EDOD: Home with 24/7 2/25         Spoke with daughter Roxanne [phone] - patient's needs, prognosis and discharge plan discussed - she understands that patient will need  supervision and assistance at home after discharge

## 2022-02-19 NOTE — PROGRESS NOTE ADULT - SUBJECTIVE AND OBJECTIVE BOX
Patient is a 66y old  Male who presents with a chief complaint of Glioblastoma (19 Feb 2022 08:47)      Patient seen and examined at bedside. No acute overnight events. Patient denies fever, chills, CP, palpitations, SOB, abdominal pain, nausea or vomiting.    ALLERGIES:  No Known Allergies    MEDICATIONS  (STANDING):  atorvastatin 20 milliGRAM(s) Oral at bedtime  dexAMETHasone     Tablet 2 milliGRAM(s) Oral two times a day  dextrose 40% Gel 15 Gram(s) Oral once  dextrose 5%. 1000 milliLiter(s) (50 mL/Hr) IV Continuous <Continuous>  dextrose 5%. 1000 milliLiter(s) (100 mL/Hr) IV Continuous <Continuous>  dextrose 50% Injectable 25 Gram(s) IV Push once  dextrose 50% Injectable 12.5 Gram(s) IV Push once  dextrose 50% Injectable 25 Gram(s) IV Push once  enoxaparin Injectable 40 milliGRAM(s) SubCutaneous daily  escitalopram 5 milliGRAM(s) Oral daily  fluticasone propionate 50 MICROgram(s)/spray Nasal Spray 1 Spray(s) Both Nostrils two times a day  gabapentin 100 milliGRAM(s) Oral at bedtime  glucagon  Injectable 1 milliGRAM(s) IntraMuscular once  insulin glargine Injectable (LANTUS) 16 Unit(s) SubCutaneous at bedtime  insulin lispro (ADMELOG) corrective regimen sliding scale   SubCutaneous three times a day before meals  insulin lispro Injectable (ADMELOG) 5 Unit(s) SubCutaneous three times a day before meals  lacosamide Solution 100 milliGRAM(s) Oral two times a day  metFORMIN 500 milliGRAM(s) Oral two times a day  pantoprazole    Tablet 40 milliGRAM(s) Oral before breakfast  polyethylene glycol 3350 17 Gram(s) Oral every 12 hours  senna 2 Tablet(s) Oral at bedtime  sodium chloride 1 Gram(s) Oral every 6 hours    MEDICATIONS  (PRN):  acetaminophen     Tablet .. 650 milliGRAM(s) Oral every 6 hours PRN Temp greater or equal to 38C (100.4F), Mild Pain (1 - 3)  benzocaine 15 mG/menthol 3.6 mG Lozenge 1 Lozenge Oral every 4 hours PRN Sore Throat    Vital Signs Last 24 Hrs  T(F): 98.4 (19 Feb 2022 08:19), Max: 98.4 (18 Feb 2022 19:13)  HR: 76 (19 Feb 2022 08:19) (76 - 77)  BP: 100/66 (19 Feb 2022 08:19) (94/51 - 100/66)  RR: 15 (19 Feb 2022 08:19) (15 - 16)  SpO2: 96% (19 Feb 2022 08:19) (96% - 96%)    I&O's Summary    19 Feb 2022 07:01  -  19 Feb 2022 15:57  --------------------------------------------------------  IN: 480 mL / OUT: 0 mL / NET: 480 mL      PHYSICAL EXAM:  GENERAL: NAD, well-developed  HEAD:  Surgical incision site c/d/i s/p staple removal  NECK: Supple, No JVD  CHEST/LUNG: Clear to auscultation bilaterally; No wheeze, nonlabored breathing  HEART: Regular rate and rhythm; No murmurs, rubs, or gallops  ABDOMEN: Soft, Nontender, Nondistended; Bowel sounds present  EXTREMITIES:  2+ Peripheral Pulses, No clubbing, cyanosis, or edema  NEUROLOGY: AAOx3, non-focal  PSYCH: calm, appropriate mood     LABS:                        11.6   6.97  )-----------( 114      ( 18 Feb 2022 07:30 )             34.5       02-17    134  |  99  |  18  ----------------------------<  196  4.3   |  26  |  0.90    Ca    9.1      17 Feb 2022 07:45    TPro  7.0  /  Alb  2.7  /  TBili  0.7  /  DBili  x   /  AST  6   /  ALT  18  /  AlkPhos  76  02-17     eGFR if Non African American: 89 mL/min/1.73M2 (02-17-22 @ 07:45)  eGFR if : 103 mL/min/1.73M2 (02-17-22 @ 07:45)    POCT Blood Glucose.: 261 mg/dL (19 Feb 2022 11:46)  POCT Blood Glucose.: 167 mg/dL (19 Feb 2022 07:41)  POCT Blood Glucose.: 143 mg/dL (18 Feb 2022 21:20)  POCT Blood Glucose.: 93 mg/dL (18 Feb 2022 16:54)          Culture - Group A Streptococcus (collected 12 Feb 2022 16:57)  Source: .Throat Throat  Final Report (14 Feb 2022 07:31):    No Streptococcus pyogenes (Group A) isolated      COVID-19 PCR: NotDetec (02-18-22 @ 06:50)  COVID-19 PCR: NotDetec (02-11-22 @ 22:10)  COVID-19 PCR: NotDetec (02-10-22 @ 11:57)  COVID-19 PCR: NotDetec (02-09-22 @ 10:20)  COVID-19 PCR: Detected (02-08-22 @ 22:50)      RADIOLOGY & ADDITIONAL TESTS:     Care Discussed with Consultants/Other Providers: Rehab

## 2022-02-19 NOTE — PROGRESS NOTE ADULT - ASSESSMENT
67 y/o man with PMH of  HTN, DM2, and recent discovery of brain Glioblastoma s/p 1/27/22 for left sided craniotomy. Post-op course was complicated by possible hyponatermia with salt tabs added to regimen. 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 He was admitted to [facility] for functional and gair impairment     Functional and gait instability:  - C/w PT/OT per primary team     Left frontal GBM s/p craniotomy for resection with right sided drift  -Continue Vimpat 100mg BID for seizure prophylaxis  -Continue Decadron slow taper- now on 2mg Q12h - to maintain until follow up with neurosurgery   -Will require follow up with neuro-oncology     Hyponatremia  -Continue fluid restriction  -Continue salt tabs 1G Q6h    HTN  -lisinopril has been held due to soft BP     HLD   -Continue atorvastatin 20mg daily    Type 2 DM uncontrolled  - A1c is 10.7  -At this time will Continue Lantus 16 units QHS + premeal 5units + Metformin 500mg bid. Will eventually titrate down insulin as demand will decrease with increase insulin sensitivity due to Metformin. But currently still having episodes of hyperglycemia  -FS and ISS    DVT PPX  - Lovenox  - SCDs  - TEDs

## 2022-02-19 NOTE — PROGRESS NOTE ADULT - SUBJECTIVE AND OBJECTIVE BOX
CHIEF COMPLAINT:  depressed, refusing therapy       HISTORY OF PRESENT ILLNESS    This is a 65 YO male with PMH of  HTN, DM2, and known brain mass. He was in the [place] for vacation in early January, when pt was noted to be confused and not answering appropriately, so an MRI was done on Jan 7 showed L frontal enhancing mass with significant edema. He presented to [facility] on 1/24 for further evaluation and repeat CTH showed significant vasogenic edema and 1.5cm MLS. He underwent 1/27/22 for left sided craniotomy for tumor resection with gleolan.  Pt was started on decadron and Vimpat also.  Post op course was complicated by possible seizures with EEG negative. Hyponatermia with salt tabs added to regimen. 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 He was evaluated by PMR and was recommended for acute inpatient rehab. He was admitted to [facility] on 02/08/22, however COVID test came back positive. COVID tests from 02/04 and 02/06/22 were negative. Patient remains asymptomatic.     COVID swab from 2/9/2022 and 2/10/2022 are both negative. Per medical team, he does not need isolation at this time. COVID swab from 2/8/2022 was likely a false positive. He was seen by ENT for hoarseness - vocal cords mobile but with incomplete closure and Continue with speech and swallow therapy. Follow up with ENT/Laryngology, Dr. Graham Bergeron [phone] 2 weeks after discharge. Patient stable to return to [facility] IR[facility]  on 2/11/22.       (11 Feb 2022 15:12)        PAST MEDICAL & SURGICAL HISTORY:  Diabetes mellitus    Hypertension    Glioblastoma  just diagnosed - 1/22/22    S/P craniotomy         REVIEW OF SYMPTOMS/Subjective: Patient seen at bedside this morning. Patient in bed in NAD, no SOB, no n/v, no pain.  [X] Constitutional WNL     [X] Cardio WNL            [X] Resp WNL           [X] GI WNL                          [X]  WNL                   [ ] Heme WNL              [ ] Endo WNL                     [X] Skin WNL                 [X] MSK WNL            [X] Neuro WNL                   [ ] Cognitive WNL        [ ] Psych WNL      VITALS  Vital Signs Last 24 Hrs  T(C): 36.9 (02-19-22 @ 08:19), Max: 36.9 (02-18-22 @ 19:13)  T(F): 98.4 (02-19-22 @ 08:19), Max: 98.4 (02-18-22 @ 19:13)  HR: 76 (02-19-22 @ 08:19) (75 - 77)  BP: 100/66 (02-19-22 @ 08:19) (94/51 - 117/77)  BP(mean): --  RR: 15 (02-19-22 @ 08:19) (15 - 16)  SpO2: 96% (02-19-22 @ 08:19) (94% - 96%)          PHYSICAL EXAM  Constitutional - NAD, Comfortable  HEENT - NCAT, EOMI  Neck - Supple, No limited ROM  Chest - CTA bilaterally  Cardiovascular - RRR, S1S2  Abdomen -  Soft, NTND  Extremities - No C/C/E, No calf tenderness   Neurologic Exam -  no new  focal deficits, 5/5 motor power thoughout                        RECENT LABS  LABS:                                  11.6   6.97  )-----------( 114      ( 18 Feb 2022 07:30 )             34.5     02-17    134<L>  |  99  |  18  ----------------------------<  196<H>  4.3   |  26  |  0.90    Ca    9.1      17 Feb 2022 07:45    TPro  7.0  /  Alb  2.7<L>  /  TBili  0.7  /  DBili  x   /  AST  6<L>  /  ALT  18  /  AlkPhos  76  02-17      LIVER FUNCTIONS - ( 17 Feb 2022 07:45 )  Alb: 2.7 g/dL / Pro: 7.0 g/dL / ALK PHOS: 76 U/L / ALT: 18 U/L / AST: 6 U/L / GGT: x               CURRENT MEDICATIONS  MEDICATIONS  (STANDING):  atorvastatin 20 milliGRAM(s) Oral at bedtime  dexAMETHasone     Tablet 2 milliGRAM(s) Oral two times a day  dextrose 40% Gel 15 Gram(s) Oral once  dextrose 5%. 1000 milliLiter(s) (100 mL/Hr) IV Continuous <Continuous>  dextrose 5%. 1000 milliLiter(s) (50 mL/Hr) IV Continuous <Continuous>  dextrose 50% Injectable 25 Gram(s) IV Push once  dextrose 50% Injectable 12.5 Gram(s) IV Push once  dextrose 50% Injectable 25 Gram(s) IV Push once  enoxaparin Injectable 40 milliGRAM(s) SubCutaneous daily  fluticasone propionate 50 MICROgram(s)/spray Nasal Spray 1 Spray(s) Both Nostrils two times a day  gabapentin 100 milliGRAM(s) Oral at bedtime  glucagon  Injectable 1 milliGRAM(s) IntraMuscular once  insulin glargine Injectable (LANTUS) 16 Unit(s) SubCutaneous at bedtime  insulin lispro (ADMELOG) corrective regimen sliding scale   SubCutaneous three times a day before meals  insulin lispro Injectable (ADMELOG) 5 Unit(s) SubCutaneous three times a day before meals  lacosamide Solution 100 milliGRAM(s) Oral two times a day  metFORMIN 500 milliGRAM(s) Oral two times a day  pantoprazole    Tablet 40 milliGRAM(s) Oral before breakfast  polyethylene glycol 3350 17 Gram(s) Oral every 12 hours  senna 2 Tablet(s) Oral at bedtime  sodium chloride 1 Gram(s) Oral every 6 hours    MEDICATIONS  (PRN):  acetaminophen     Tablet .. 650 milliGRAM(s) Oral every 6 hours PRN Temp greater or equal to 38C (100.4F), Mild Pain (1 - 3)  benzocaine 15 mG/menthol 3.6 mG Lozenge 1 Lozenge Oral every 4 hours PRN Sore Throat

## 2022-02-20 LAB
GLUCOSE BLDC GLUCOMTR-MCNC: 146 MG/DL — HIGH (ref 70–99)
GLUCOSE BLDC GLUCOMTR-MCNC: 165 MG/DL — HIGH (ref 70–99)
GLUCOSE BLDC GLUCOMTR-MCNC: 183 MG/DL — HIGH (ref 70–99)
GLUCOSE BLDC GLUCOMTR-MCNC: 255 MG/DL — HIGH (ref 70–99)

## 2022-02-20 PROCEDURE — 99232 SBSQ HOSP IP/OBS MODERATE 35: CPT

## 2022-02-20 RX ADMIN — ENOXAPARIN SODIUM 40 MILLIGRAM(S): 100 INJECTION SUBCUTANEOUS at 11:59

## 2022-02-20 RX ADMIN — Medication 5 UNIT(S): at 11:59

## 2022-02-20 RX ADMIN — SODIUM CHLORIDE 1 GRAM(S): 9 INJECTION INTRAMUSCULAR; INTRAVENOUS; SUBCUTANEOUS at 05:51

## 2022-02-20 RX ADMIN — LACOSAMIDE 100 MILLIGRAM(S): 50 TABLET ORAL at 17:15

## 2022-02-20 RX ADMIN — SODIUM CHLORIDE 1 GRAM(S): 9 INJECTION INTRAMUSCULAR; INTRAVENOUS; SUBCUTANEOUS at 17:15

## 2022-02-20 RX ADMIN — Medication 6: at 11:59

## 2022-02-20 RX ADMIN — ATORVASTATIN CALCIUM 20 MILLIGRAM(S): 80 TABLET, FILM COATED ORAL at 21:24

## 2022-02-20 RX ADMIN — ESCITALOPRAM OXALATE 5 MILLIGRAM(S): 10 TABLET, FILM COATED ORAL at 11:59

## 2022-02-20 RX ADMIN — METFORMIN HYDROCHLORIDE 500 MILLIGRAM(S): 850 TABLET ORAL at 17:15

## 2022-02-20 RX ADMIN — LACOSAMIDE 100 MILLIGRAM(S): 50 TABLET ORAL at 05:49

## 2022-02-20 RX ADMIN — Medication 5 UNIT(S): at 17:15

## 2022-02-20 RX ADMIN — GABAPENTIN 100 MILLIGRAM(S): 400 CAPSULE ORAL at 21:25

## 2022-02-20 RX ADMIN — Medication 1 SPRAY(S): at 17:15

## 2022-02-20 RX ADMIN — Medication 5 UNIT(S): at 07:34

## 2022-02-20 RX ADMIN — INSULIN GLARGINE 16 UNIT(S): 100 INJECTION, SOLUTION SUBCUTANEOUS at 21:25

## 2022-02-20 RX ADMIN — SENNA PLUS 2 TABLET(S): 8.6 TABLET ORAL at 21:24

## 2022-02-20 RX ADMIN — SODIUM CHLORIDE 1 GRAM(S): 9 INJECTION INTRAMUSCULAR; INTRAVENOUS; SUBCUTANEOUS at 23:26

## 2022-02-20 RX ADMIN — Medication 2 MILLIGRAM(S): at 17:15

## 2022-02-20 RX ADMIN — POLYETHYLENE GLYCOL 3350 17 GRAM(S): 17 POWDER, FOR SOLUTION ORAL at 05:51

## 2022-02-20 RX ADMIN — SODIUM CHLORIDE 1 GRAM(S): 9 INJECTION INTRAMUSCULAR; INTRAVENOUS; SUBCUTANEOUS at 11:59

## 2022-02-20 RX ADMIN — PANTOPRAZOLE SODIUM 40 MILLIGRAM(S): 20 TABLET, DELAYED RELEASE ORAL at 05:51

## 2022-02-20 RX ADMIN — Medication 2 MILLIGRAM(S): at 05:50

## 2022-02-20 RX ADMIN — Medication 2: at 07:34

## 2022-02-20 RX ADMIN — METFORMIN HYDROCHLORIDE 500 MILLIGRAM(S): 850 TABLET ORAL at 07:34

## 2022-02-20 NOTE — PROGRESS NOTE ADULT - SUBJECTIVE AND OBJECTIVE BOX
Patient is a 66y old  Male who presents with a chief complaint of Glioblastoma (20 Feb 2022 08:55)    Patient seen and examined at bedside. No acute overnight events. Patient denies complaints.    ALLERGIES:  No Known Allergies    MEDICATIONS  (STANDING):  atorvastatin 20 milliGRAM(s) Oral at bedtime  dexAMETHasone     Tablet 2 milliGRAM(s) Oral two times a day  dextrose 40% Gel 15 Gram(s) Oral once  dextrose 5%. 1000 milliLiter(s) (50 mL/Hr) IV Continuous <Continuous>  dextrose 5%. 1000 milliLiter(s) (100 mL/Hr) IV Continuous <Continuous>  dextrose 50% Injectable 25 Gram(s) IV Push once  dextrose 50% Injectable 12.5 Gram(s) IV Push once  dextrose 50% Injectable 25 Gram(s) IV Push once  enoxaparin Injectable 40 milliGRAM(s) SubCutaneous daily  escitalopram 5 milliGRAM(s) Oral daily  fluticasone propionate 50 MICROgram(s)/spray Nasal Spray 1 Spray(s) Both Nostrils two times a day  gabapentin 100 milliGRAM(s) Oral at bedtime  glucagon  Injectable 1 milliGRAM(s) IntraMuscular once  insulin glargine Injectable (LANTUS) 16 Unit(s) SubCutaneous at bedtime  insulin lispro (ADMELOG) corrective regimen sliding scale   SubCutaneous three times a day before meals  insulin lispro Injectable (ADMELOG) 5 Unit(s) SubCutaneous three times a day before meals  lacosamide Solution 100 milliGRAM(s) Oral two times a day  metFORMIN 500 milliGRAM(s) Oral two times a day  pantoprazole    Tablet 40 milliGRAM(s) Oral before breakfast  polyethylene glycol 3350 17 Gram(s) Oral every 12 hours  senna 2 Tablet(s) Oral at bedtime  sodium chloride 1 Gram(s) Oral every 6 hours    MEDICATIONS  (PRN):  acetaminophen     Tablet .. 650 milliGRAM(s) Oral every 6 hours PRN Temp greater or equal to 38C (100.4F), Mild Pain (1 - 3)  benzocaine 15 mG/menthol 3.6 mG Lozenge 1 Lozenge Oral every 4 hours PRN Sore Throat    Vital Signs Last 24 Hrs  T(F): 98.1 (19 Feb 2022 20:20), Max: 98.1 (19 Feb 2022 20:20)  HR: 89 (19 Feb 2022 20:20) (89 - 89)  BP: 102/62 (19 Feb 2022 20:20) (102/62 - 102/62)  RR: 15 (19 Feb 2022 20:20) (15 - 15)  SpO2: 96% (19 Feb 2022 20:20) (96% - 96%)    I&O's Summary    19 Feb 2022 07:01  -  20 Feb 2022 07:00  --------------------------------------------------------  IN: 480 mL / OUT: 0 mL / NET: 480 mL          PHYSICAL EXAM:  GENERAL: NAD  HEENT: Anicteric, moist mucous membranes; scalp surgical incision site c/d/i s/p staple removal  CHEST/LUNG: CTA b/l, no rales, wheezes, or rhonchi  HEART: RRR, S1, S2  ABDOMEN: BS+, soft, nontender, nondistended  EXTREMITIES: No edema, cyanosis, or calf tenderness  NERVOUS SYSTEM: Answers questions and follows commands appropriately    LABS:                        11.6   6.97  )-----------( 114      ( 18 Feb 2022 07:30 )             34.5                                       POCT Blood Glucose.: 255 mg/dL (20 Feb 2022 11:18)  POCT Blood Glucose.: 165 mg/dL (20 Feb 2022 07:31)  POCT Blood Glucose.: 150 mg/dL (19 Feb 2022 21:50)  POCT Blood Glucose.: 165 mg/dL (19 Feb 2022 16:32)          COVID-19 PCR: NotDetec (02-18-22 @ 06:50)  COVID-19 PCR: NotDetec (02-11-22 @ 22:10)  COVID-19 PCR: NotDetec (02-10-22 @ 11:57)  COVID-19 PCR: NotDetec (02-09-22 @ 10:20)  COVID-19 PCR: Detected (02-08-22 @ 22:50)      RADIOLOGY & ADDITIONAL TESTS:     Care Discussed with Consultants/Other Providers: Rehab

## 2022-02-20 NOTE — PROGRESS NOTE ADULT - ASSESSMENT
67 y/o man with PMH of  HTN, DM2, and recent discovery of brain Glioblastoma s/p 1/27/22 for left sided craniotomy. Post-op course was complicated by possible hyponatermia with salt tabs added to regimen. 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 He was admitted to [facility] for functional and gair impairment     Functional and gait instability:  - C/w PT/OT per primary team     Left frontal GBM s/p craniotomy for resection with right sided drift  -Continue Vimpat 100mg BID for seizure prophylaxis  -Continue Decadron slow taper- now on 2mg Q12h - to maintain until follow up with neurosurgery   -Will require follow up with neuro-oncology     Hyponatremia  -Continue fluid restriction  -Continue salt tabs 1G Q6h    HTN  -lisinopril has been held due to soft BP     HLD   -Continue atorvastatin 20mg daily    Type 2 DM uncontrolled  -A1c is 10.7  -At this time will continue Lantus 16 units QHS + premeal 5units + Metformin 500mg bid. Will eventually titrate down insulin as demand will decrease with increase insulin sensitivity due to Metformin. But currently still having episodes of hyperglycemia, mostly at lunchtime  -FS and ISS    DVT PPX  - Lovenox  - SCDs  - TEDs

## 2022-02-20 NOTE — PROGRESS NOTE ADULT - SUBJECTIVE AND OBJECTIVE BOX
CHIEF COMPLAINT:  depressed, refusing therapy       HISTORY OF PRESENT ILLNESS    This is a 67 YO male with PMH of  HTN, DM2, and known brain mass. He was in the [place] for vacation in early January, when pt was noted to be confused and not answering appropriately, so an MRI was done on Jan 7 showed L frontal enhancing mass with significant edema. He presented to [facility] on 1/24 for further evaluation and repeat CTH showed significant vasogenic edema and 1.5cm MLS. He underwent 1/27/22 for left sided craniotomy for tumor resection with gleolan.  Pt was started on decadron and Vimpat also.  Post op course was complicated by possible seizures with EEG negative. Hyponatermia with salt tabs added to regimen. 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 He was evaluated by PMR and was recommended for acute inpatient rehab. He was admitted to [facility] on 02/08/22, however COVID test came back positive. COVID tests from 02/04 and 02/06/22 were negative. Patient remains asymptomatic.     COVID swab from 2/9/2022 and 2/10/2022 are both negative. Per medical team, he does not need isolation at this time. COVID swab from 2/8/2022 was likely a false positive. He was seen by ENT for hoarseness - vocal cords mobile but with incomplete closure and Continue with speech and swallow therapy. Follow up with ENT/Laryngology, Dr. Graham Bergeron [phone] 2 weeks after discharge. Patient stable to return to [facility] IR[facility]  on 2/11/22.       (11 Feb 2022 15:12)        PAST MEDICAL & SURGICAL HISTORY:  Diabetes mellitus    Hypertension    Glioblastoma  just diagnosed - 1/22/22    S/P craniotomy         REVIEW OF SYMPTOMS/Subjective: Patient seen at bedside this morning. Patient in bed in NAD, no SOB, no n/v, no pain.  [X] Constitutional WNL     [X] Cardio WNL            [X] Resp WNL           [X] GI WNL                          [X]  WNL                   [ ] Heme WNL              [ ] Endo WNL                     [X] Skin WNL                 [X] MSK WNL            [X] Neuro WNL                   [ ] Cognitive WNL        [ ] Psych WNL      VITALS  Vital Signs Last 24 Hrs  T(C): 36.7 (02-19-22 @ 20:20), Max: 36.7 (02-19-22 @ 20:20)  T(F): 98.1 (02-19-22 @ 20:20), Max: 98.1 (02-19-22 @ 20:20)  HR: 89 (02-19-22 @ 20:20) (89 - 89)  BP: 102/62 (02-19-22 @ 20:20) (102/62 - 102/62)  BP(mean): --  RR: 15 (02-19-22 @ 20:20) (15 - 15)  SpO2: 96% (02-19-22 @ 20:20) (96% - 96%)        PHYSICAL EXAM  Constitutional - NAD, Comfortable  HEENT - NCAT, EOMI  Neck - Supple, No limited ROM  Chest - CTA bilaterally  Cardiovascular - RRR, S1S2  Abdomen -  Soft, NTND  Extremities - No C/C/E, No calf tenderness   Neurologic Exam -  no new  focal deficits, 5/5 motor power thoughout                        RECENT LABS  LABS:  LABS:                                            11.6   6.97  )-----------( 114      ( 18 Feb 2022 07:30 )             34.5     02-17    134<L>  |  99  |  18  ----------------------------<  196<H>  4.3   |  26  |  0.90    Ca    9.1      17 Feb 2022 07:45    TPro  7.0  /  Alb  2.7<L>  /  TBili  0.7  /  DBili  x   /  AST  6<L>  /  ALT  18  /  AlkPhos  76  02-17      LIVER FUNCTIONS - ( 17 Feb 2022 07:45 )  Alb: 2.7 g/dL / Pro: 7.0 g/dL / ALK PHOS: 76 U/L / ALT: 18 U/L / AST: 6 U/L / GGT: x               CURRENT MEDICATIONS  MEDICATIONS  (STANDING):  atorvastatin 20 milliGRAM(s) Oral at bedtime  dexAMETHasone     Tablet 2 milliGRAM(s) Oral two times a day  dextrose 40% Gel 15 Gram(s) Oral once  dextrose 5%. 1000 milliLiter(s) (100 mL/Hr) IV Continuous <Continuous>  dextrose 5%. 1000 milliLiter(s) (50 mL/Hr) IV Continuous <Continuous>  dextrose 50% Injectable 25 Gram(s) IV Push once  dextrose 50% Injectable 12.5 Gram(s) IV Push once  dextrose 50% Injectable 25 Gram(s) IV Push once  enoxaparin Injectable 40 milliGRAM(s) SubCutaneous daily  fluticasone propionate 50 MICROgram(s)/spray Nasal Spray 1 Spray(s) Both Nostrils two times a day  gabapentin 100 milliGRAM(s) Oral at bedtime  glucagon  Injectable 1 milliGRAM(s) IntraMuscular once  insulin glargine Injectable (LANTUS) 16 Unit(s) SubCutaneous at bedtime  insulin lispro (ADMELOG) corrective regimen sliding scale   SubCutaneous three times a day before meals  insulin lispro Injectable (ADMELOG) 5 Unit(s) SubCutaneous three times a day before meals  lacosamide Solution 100 milliGRAM(s) Oral two times a day  metFORMIN 500 milliGRAM(s) Oral two times a day  pantoprazole    Tablet 40 milliGRAM(s) Oral before breakfast  polyethylene glycol 3350 17 Gram(s) Oral every 12 hours  senna 2 Tablet(s) Oral at bedtime  sodium chloride 1 Gram(s) Oral every 6 hours    MEDICATIONS  (PRN):  acetaminophen     Tablet .. 650 milliGRAM(s) Oral every 6 hours PRN Temp greater or equal to 38C (100.4F), Mild Pain (1 - 3)  benzocaine 15 mG/menthol 3.6 mG Lozenge 1 Lozenge Oral every 4 hours PRN Sore Throat

## 2022-02-20 NOTE — PROGRESS NOTE ADULT - ASSESSMENT
ASSESSMENT/PLAN  66 year old male with PMH of  HTN, DM2, and known brain mass. He was in the [place] for vacation in early January, when he was noted to be confused and not answering appropriately, so an MRI was done on Jan 7 showed L frontal enhancing mass with significant edema. He presented to [facility] on 1/24 for further evaluation and repeat CTH which  showed significant vasogenic edema and 1.5cm MLS. He underwent 1/27/22 for left sided craniotomy for tumor resection with gleolan. Post-op course was complicated by possible seizures with EEG negative, hyponatermia with salt tabs added to regimen. 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 He was admitted to [facility] but tested positive for covid. He was asymptomatic but trasnferred to medical floor for quarantine. His subsequent covid swabs came back negative. Patient was deemed stable to return to [facility]  on 2/11/22. Patient  with gait Instability, ADL impairments and Functional impairments.    #Left frontal tumor s/p craniotomy for resection with right sided drift, Gait Instability, ADL impairments and Functional impairments  - Continue Comprehensive Rehab Program of PT/OT/SLP  - Monitor incision -staples removed 2/14   -Continue Vimpat 100mg BID for seizure prophylaxis  -Continue Decadron slow taper- now on 2mg Q12h - to maintain until follow up with neurosurgery   -Will require follow up with neuro-oncology     # depression  -  Lexapro  - Neuropsychology  input       #Hyponatremia  -Continue salt tabs 1G Q6h  - monitor NA  - Na 136 (2/14), Na 134 (2/17)    #HTN  -Home med: lisinopril 5mg daily  -BP controlled     #HLD   -Continue atorvastatin 20mg daily    #Type 2 DM  - A1c is 10.7 (1/25)  -FS and ISS  -Continue Lantus 16 units at bedtime  -Continue metformin 500mg BID     #Pain control  - Tylenol PRN    #Covid 19 - false positive   - positive on 2/8- likely false positive  - Negative  2/9 and 2/10  -Continues to be asymptomatic    #GI/Bowel Mgmt   - Continue Senna at bedtime   - Miralax BID    #Bladder management  - Continue to monitor PVR q 8 hours (SC if > 400)  -Monitor UO    #DVT  - Lovenox  - SCDs  - TEDs     #Skin:  -Incisional wound care per nursing. Surgical staples removed, wound intact    FEN   - Diet -regular with thin liquids [CCHO, DASH/TLC]    -Passed MBS 2/16    - Dysphagia  SLP     Labs: CBC, CMP 2/21        Outpatient Follow-up (Specialty/Name of physician):  Parker Gupta)  Neurosurgery  [address] [place] [address]  Phone: [phone]  Fax: [phone]  Follow Up Time:     Matthew Hill)  Neurology  [facility], [address] [place] [address]  Phone: [phone]  Fax: [phone]  Follow Up Time:     Kristopher Guevara  RADIATION ONCOLOGY  [address] [place] [address] A [place] [address]  Phone: [phone]  Fax: [phone]    TEAM MEETING 2/16/22  SW:  moving in with daughter, family training needed  OT: Mod I for eating/grooming, Sv bathing and transfers, min for toileting  PT: SV for transfers, 100' amb no device with SV, 12 stairs with SV  SLP: MBS passed for regular with thins, delayed emptying may need GI as outpatient  barriers: cognition, comprehension, needs 24/7 supervision, impulsive   goals: Mod I for adls and transfers, SV for ambulation  EDOD: Home with 24/7 2/25         Spoke with daughter Roxanne [phone] - patient's needs, prognosis and discharge plan discussed - she understands that patient will need  supervision and assistance at home after discharge

## 2022-02-21 LAB
ALBUMIN SERPL ELPH-MCNC: 2.5 G/DL — LOW (ref 3.3–5)
ALP SERPL-CCNC: 70 U/L — SIGNIFICANT CHANGE UP (ref 40–120)
ALT FLD-CCNC: 18 U/L — SIGNIFICANT CHANGE UP (ref 10–45)
ANION GAP SERPL CALC-SCNC: 8 MMOL/L — SIGNIFICANT CHANGE UP (ref 5–17)
AST SERPL-CCNC: 9 U/L — LOW (ref 10–40)
BILIRUB SERPL-MCNC: 0.5 MG/DL — SIGNIFICANT CHANGE UP (ref 0.2–1.2)
BUN SERPL-MCNC: 19 MG/DL — SIGNIFICANT CHANGE UP (ref 7–23)
CALCIUM SERPL-MCNC: 9 MG/DL — SIGNIFICANT CHANGE UP (ref 8.4–10.5)
CHLORIDE SERPL-SCNC: 99 MMOL/L — SIGNIFICANT CHANGE UP (ref 96–108)
CO2 SERPL-SCNC: 30 MMOL/L — SIGNIFICANT CHANGE UP (ref 22–31)
CREAT SERPL-MCNC: 0.99 MG/DL — SIGNIFICANT CHANGE UP (ref 0.5–1.3)
GLUCOSE BLDC GLUCOMTR-MCNC: 109 MG/DL — HIGH (ref 70–99)
GLUCOSE BLDC GLUCOMTR-MCNC: 155 MG/DL — HIGH (ref 70–99)
GLUCOSE BLDC GLUCOMTR-MCNC: 176 MG/DL — HIGH (ref 70–99)
GLUCOSE BLDC GLUCOMTR-MCNC: 181 MG/DL — HIGH (ref 70–99)
GLUCOSE SERPL-MCNC: 196 MG/DL — HIGH (ref 70–99)
HCT VFR BLD CALC: 36.9 % — LOW (ref 39–50)
HGB BLD-MCNC: 11.8 G/DL — LOW (ref 13–17)
MCHC RBC-ENTMCNC: 31 PG — SIGNIFICANT CHANGE UP (ref 27–34)
MCHC RBC-ENTMCNC: 32 GM/DL — SIGNIFICANT CHANGE UP (ref 32–36)
MCV RBC AUTO: 96.9 FL — SIGNIFICANT CHANGE UP (ref 80–100)
NRBC # BLD: 0 /100 WBCS — SIGNIFICANT CHANGE UP (ref 0–0)
PLATELET # BLD AUTO: 145 K/UL — LOW (ref 150–400)
POTASSIUM SERPL-MCNC: 4.1 MMOL/L — SIGNIFICANT CHANGE UP (ref 3.5–5.3)
POTASSIUM SERPL-SCNC: 4.1 MMOL/L — SIGNIFICANT CHANGE UP (ref 3.5–5.3)
PROT SERPL-MCNC: 6.8 G/DL — SIGNIFICANT CHANGE UP (ref 6–8.3)
RBC # BLD: 3.81 M/UL — LOW (ref 4.2–5.8)
RBC # FLD: 13.1 % — SIGNIFICANT CHANGE UP (ref 10.3–14.5)
SODIUM SERPL-SCNC: 137 MMOL/L — SIGNIFICANT CHANGE UP (ref 135–145)
WBC # BLD: 6.58 K/UL — SIGNIFICANT CHANGE UP (ref 3.8–10.5)
WBC # FLD AUTO: 6.58 K/UL — SIGNIFICANT CHANGE UP (ref 3.8–10.5)

## 2022-02-21 PROCEDURE — 99232 SBSQ HOSP IP/OBS MODERATE 35: CPT

## 2022-02-21 RX ORDER — SODIUM CHLORIDE 9 MG/ML
1 INJECTION INTRAMUSCULAR; INTRAVENOUS; SUBCUTANEOUS EVERY 8 HOURS
Refills: 0 | Status: DISCONTINUED | OUTPATIENT
Start: 2022-02-21 | End: 2022-02-24

## 2022-02-21 RX ORDER — METFORMIN HYDROCHLORIDE 850 MG/1
1000 TABLET ORAL
Refills: 0 | Status: DISCONTINUED | OUTPATIENT
Start: 2022-02-21 | End: 2022-02-24

## 2022-02-21 RX ADMIN — ENOXAPARIN SODIUM 40 MILLIGRAM(S): 100 INJECTION SUBCUTANEOUS at 12:04

## 2022-02-21 RX ADMIN — Medication 2: at 16:15

## 2022-02-21 RX ADMIN — METFORMIN HYDROCHLORIDE 1000 MILLIGRAM(S): 850 TABLET ORAL at 17:32

## 2022-02-21 RX ADMIN — Medication 650 MILLIGRAM(S): at 12:04

## 2022-02-21 RX ADMIN — Medication 5 UNIT(S): at 16:15

## 2022-02-21 RX ADMIN — Medication 650 MILLIGRAM(S): at 12:45

## 2022-02-21 RX ADMIN — Medication 5 UNIT(S): at 11:11

## 2022-02-21 RX ADMIN — Medication 2 MILLIGRAM(S): at 17:31

## 2022-02-21 RX ADMIN — Medication 1 SPRAY(S): at 05:14

## 2022-02-21 RX ADMIN — Medication 1 SPRAY(S): at 17:32

## 2022-02-21 RX ADMIN — SODIUM CHLORIDE 1 GRAM(S): 9 INJECTION INTRAMUSCULAR; INTRAVENOUS; SUBCUTANEOUS at 12:06

## 2022-02-21 RX ADMIN — Medication 2: at 11:10

## 2022-02-21 RX ADMIN — ATORVASTATIN CALCIUM 20 MILLIGRAM(S): 80 TABLET, FILM COATED ORAL at 22:13

## 2022-02-21 RX ADMIN — SODIUM CHLORIDE 1 GRAM(S): 9 INJECTION INTRAMUSCULAR; INTRAVENOUS; SUBCUTANEOUS at 05:14

## 2022-02-21 RX ADMIN — LACOSAMIDE 100 MILLIGRAM(S): 50 TABLET ORAL at 05:14

## 2022-02-21 RX ADMIN — SENNA PLUS 2 TABLET(S): 8.6 TABLET ORAL at 22:13

## 2022-02-21 RX ADMIN — PANTOPRAZOLE SODIUM 40 MILLIGRAM(S): 20 TABLET, DELAYED RELEASE ORAL at 05:14

## 2022-02-21 RX ADMIN — Medication 5 UNIT(S): at 07:05

## 2022-02-21 RX ADMIN — GABAPENTIN 100 MILLIGRAM(S): 400 CAPSULE ORAL at 22:13

## 2022-02-21 RX ADMIN — Medication 2 MILLIGRAM(S): at 05:13

## 2022-02-21 RX ADMIN — SODIUM CHLORIDE 1 GRAM(S): 9 INJECTION INTRAMUSCULAR; INTRAVENOUS; SUBCUTANEOUS at 22:13

## 2022-02-21 RX ADMIN — ESCITALOPRAM OXALATE 5 MILLIGRAM(S): 10 TABLET, FILM COATED ORAL at 12:04

## 2022-02-21 RX ADMIN — Medication 2: at 07:05

## 2022-02-21 RX ADMIN — LACOSAMIDE 100 MILLIGRAM(S): 50 TABLET ORAL at 17:32

## 2022-02-21 RX ADMIN — INSULIN GLARGINE 16 UNIT(S): 100 INJECTION, SOLUTION SUBCUTANEOUS at 22:13

## 2022-02-21 RX ADMIN — METFORMIN HYDROCHLORIDE 500 MILLIGRAM(S): 850 TABLET ORAL at 07:40

## 2022-02-21 NOTE — PROGRESS NOTE ADULT - SUBJECTIVE AND OBJECTIVE BOX
CHIEF COMPLAINT:  depressed, refusing therapy       HISTORY OF PRESENT ILLNESS    This is a 67 YO male with PMH of  HTN, DM2, and known brain mass. He was in the [place] for vacation in early January, when pt was noted to be confused and not answering appropriately, so an MRI was done on Jan 7 showed L frontal enhancing mass with significant edema. He presented to [facility] on 1/24 for further evaluation and repeat CTH showed significant vasogenic edema and 1.5cm MLS. He underwent 1/27/22 for left sided craniotomy for tumor resection with gleolan.  Pt was started on decadron and Vimpat also.  Post op course was complicated by possible seizures with EEG negative. Hyponatermia with salt tabs added to regimen. 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 He was evaluated by PMR and was recommended for acute inpatient rehab. He was admitted to [facility] on 02/08/22, however COVID test came back positive. COVID tests from 02/04 and 02/06/22 were negative. Patient remains asymptomatic.     COVID swab from 2/9/2022 and 2/10/2022 are both negative. Per medical team, he does not need isolation at this time. COVID swab from 2/8/2022 was likely a false positive. He was seen by ENT for hoarseness - vocal cords mobile but with incomplete closure and Continue with speech and swallow therapy. Follow up with ENT/Laryngology, Dr. Graham Bergeron [phone] 2 weeks after discharge. Patient stable to return to [facility]  on 2/11/22.       (11 Feb 2022 15:12)        PAST MEDICAL & SURGICAL HISTORY:  Diabetes mellitus    Hypertension    Glioblastoma  just diagnosed - 1/22/22    S/P craniotomy         REVIEW OF SYMPTOMS/Subjective: Patient seen at bedside this morning. Patient in bed in NAD, no SOB, no n/v, no pain.  [X] Constitutional WNL     [X] Cardio WNL            [X] Resp WNL           [X] GI WNL                          [X]  WNL                   [ ] Heme WNL              [ ] Endo WNL                     [X] Skin WNL                 [ ] MSK WNL            [X] Neuro WNL                   [ ] Cognitive WNL        [ ] Psych WNL      VITALS  Vital Signs Last 24 Hrs  T(C): 36.8 (02-21-22 @ 07:03), Max: 36.8 (02-21-22 @ 07:03)  T(F): 98.2 (02-21-22 @ 07:03), Max: 98.2 (02-21-22 @ 07:03)  HR: 67 (02-21-22 @ 07:03) (67 - 86)  BP: 103/65 (02-21-22 @ 07:03) (102/55 - 103/65)  BP(mean): --  RR: 15 (02-21-22 @ 07:03) (15 - 16)  SpO2: 96% (02-21-22 @ 07:03) (95% - 96%)        PHYSICAL EXAM  Constitutional - NAD, Comfortable  HEENT - NCAT, MMM  Neck - Supple, No limited ROM  Chest - CTA bilaterally  Cardiovascular - RRR, S1S2  Abdomen -  Soft, NTND  Extremities - No C/C/E, No calf tenderness   Neurologic Exam -  no new  focal deficits, 5/5 motor power throughout                        RECENT LABS  LABS:  LABS:  LABS:                        11.8   6.58  )-----------( 145      ( 21 Feb 2022 06:41 )             36.9     21 Feb 2022 06:41    137    |  99     |  19     ----------------------------<  196    4.1     |  30     |  0.99     Ca    9.0        21 Feb 2022 06:41    TPro  6.8    /  Alb  2.5    /  TBili  0.5    /  DBili  x      /  AST  9      /  ALT  18     /  AlkPhos  70     21 Feb 2022 06:41                              11.6   6.97  )-----------( 114      ( 18 Feb 2022 07:30 )             34.5     02-17    134<L>  |  99  |  18  ----------------------------<  196<H>  4.3   |  26  |  0.90    Ca    9.1      17 Feb 2022 07:45    TPro  7.0  /  Alb  2.7<L>  /  TBili  0.7  /  DBili  x   /  AST  6<L>  /  ALT  18  /  AlkPhos  76  02-17      LIVER FUNCTIONS - ( 17 Feb 2022 07:45 )  Alb: 2.7 g/dL / Pro: 7.0 g/dL / ALK PHOS: 76 U/L / ALT: 18 U/L / AST: 6 U/L / GGT: x               CURRENT MEDICATIONS  MEDICATIONS  (STANDING):  atorvastatin 20 milliGRAM(s) Oral at bedtime  dexAMETHasone     Tablet 2 milliGRAM(s) Oral two times a day  dextrose 40% Gel 15 Gram(s) Oral once  dextrose 5%. 1000 milliLiter(s) (100 mL/Hr) IV Continuous <Continuous>  dextrose 5%. 1000 milliLiter(s) (50 mL/Hr) IV Continuous <Continuous>  dextrose 50% Injectable 25 Gram(s) IV Push once  dextrose 50% Injectable 12.5 Gram(s) IV Push once  dextrose 50% Injectable 25 Gram(s) IV Push once  enoxaparin Injectable 40 milliGRAM(s) SubCutaneous daily  fluticasone propionate 50 MICROgram(s)/spray Nasal Spray 1 Spray(s) Both Nostrils two times a day  gabapentin 100 milliGRAM(s) Oral at bedtime  glucagon  Injectable 1 milliGRAM(s) IntraMuscular once  insulin glargine Injectable (LANTUS) 16 Unit(s) SubCutaneous at bedtime  insulin lispro (ADMELOG) corrective regimen sliding scale   SubCutaneous three times a day before meals  insulin lispro Injectable (ADMELOG) 5 Unit(s) SubCutaneous three times a day before meals  lacosamide Solution 100 milliGRAM(s) Oral two times a day  metFORMIN 500 milliGRAM(s) Oral two times a day  pantoprazole    Tablet 40 milliGRAM(s) Oral before breakfast  polyethylene glycol 3350 17 Gram(s) Oral every 12 hours  senna 2 Tablet(s) Oral at bedtime  sodium chloride 1 Gram(s) Oral every 6 hours    MEDICATIONS  (PRN):  acetaminophen     Tablet .. 650 milliGRAM(s) Oral every 6 hours PRN Temp greater or equal to 38C (100.4F), Mild Pain (1 - 3)  benzocaine 15 mG/menthol 3.6 mG Lozenge 1 Lozenge Oral every 4 hours PRN Sore Throat

## 2022-02-21 NOTE — CHART NOTE - NSCHARTNOTEFT_GEN_A_CORE
Nutrition Brief Note  Received oral consult , daughter requested additional education , reviewed Consistent Carbohydrate meal pattern & portion sizes & " Carbohydrate choice" understanding appeared good , Name & number provided if further questions arise

## 2022-02-21 NOTE — PROGRESS NOTE ADULT - ASSESSMENT
ASSESSMENT/PLAN  66 year old male with PMH of  HTN, DM2, and known brain mass. He was in the [place] for vacation in early January, when he was noted to be confused and not answering appropriately, so an MRI was done on Jan 7 showed L frontal enhancing mass with significant edema. He presented to [facility] on 1/24 for further evaluation and repeat CTH which  showed significant vasogenic edema and 1.5cm MLS. He underwent 1/27/22 for left sided craniotomy for tumor resection with gleolan. Post-op course was complicated by possible seizures with EEG negative, hyponatermia with salt tabs added to regimen. 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 He was admitted to [facility] but tested positive for covid. He was asymptomatic but trasnferred to medical floor for quarantine. His subsequent covid swabs came back negative. Patient was deemed stable to return to [facility]  on 2/11/22. Patient  with gait Instability, ADL impairments and Functional impairments.    #Left frontal tumor s/p craniotomy for resection with right sided drift, Gait Instability, ADL impairments and Functional impairments  - Continue Comprehensive Rehab Program of PT/OT/SLP  - Monitor incision -staples removed 2/14   -Continue Vimpat 100mg BID for seizure prophylaxis  -Continue Decadron slow taper- now on 2mg Q12h - to maintain until follow up with neurosurgery   -Will require follow up with neuro-oncology     # depression  -  Lexapro  - Neuropsychology  input       #Hyponatremia  -Continue salt tabs 1G Q6h  - monitor NA  - Na 136 (2/14), Na 134 (2/17), Na= 137 2/21    #HTN  -Home med: lisinopril 5mg daily  -BP controlled     #HLD   -Continue atorvastatin 20mg daily    #Type 2 DM  - A1c is 10.7 (1/25)  -FS and ISS  -Continue Lantus 16 units at bedtime  -Continue metformin 500mg BID   -Hospitalist note appreciated    #Pain control  - Tylenol PRN    #Covid 19 - false positive   - positive on 2/8- likely false positive  - Negative  2/9 and 2/10  -Continues to be asymptomatic    #GI/Bowel Mgmt   - Continue Senna at bedtime   - Miralax BID    #Bladder management  - Continue to monitor PVR q 8 hours (SC if > 400)  -Monitor UO    #DVT  - Lovenox  - SCDs  - TEDs     #Skin:  -Incisional wound care per nursing. Surgical staples removed, wound intact    FEN   - Diet -regular with thin liquids [CCHO, DASH/TLC]    -Passed MBS 2/16    - Dysphagia  SLP     Labs: CBC, CMP 2/24        Outpatient Follow-up (Specialty/Name of physician):  Parker Gupta)  Neurosurgery  [address] [place] [address]  Phone: [phone]  Fax: [phone]  Follow Up Time:     Matthew Hill)  Neurology  [facility], [address] [place] [address]  Phone: [phone]  Fax: [phone]  Follow Up Time:     Kristopher Guevara  RADIATION ONCOLOGY  [address] [place] [address] Entrance A [place] [address]  Phone: [phone]  Fax: [phone]    TEAM MEETING 2/16/22  SW:  moving in with daughter, family training needed  OT: Mod I for eating/grooming, Sv bathing and transfers, min for toileting  PT: SV for transfers, 100' amb no device with SV, 12 stairs with SV  SLP: MBS passed for regular with thins, delayed emptying may need GI as outpatient  barriers: cognition, comprehension, needs 24/7 supervision, impulsive   goals: Mod I for adls and transfers, SV for ambulation  EDOD: Home with 24/7 2/25         Spoke with daughter Roxanne [phone] - patient's needs, prognosis and discharge plan discussed - she understands that patient will need  supervision and assistance at home after discharge

## 2022-02-21 NOTE — PROGRESS NOTE ADULT - ASSESSMENT
65 y/o man with PMH of  HTN, DM2, and recent discovery of brain Glioblastoma s/p 1/27/22 for left sided craniotomy. Post-op course was complicated by possible hyponatermia with salt tabs added to regimen. 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 He was admitted to [facility] for functional and gair impairment     Functional and gait instability:  - C/w PT/OT per primary team     Left frontal GBM s/p craniotomy for resection with right sided drift  -Continue Vimpat 100mg BID for seizure prophylaxis  -Continue Decadron slow taper- now on 2mg Q12h - to maintain until follow up with neurosurgery   -Will require follow up with neuro-oncology     Hyponatremia  -Resolved  -Continue fluid restriction  -Continue salt tabs, space it out to 1G Q8h    HTN  -lisinopril has been held due to soft BP     HLD   -Continue atorvastatin 20mg daily    Type 2 DM uncontrolled  -A1c is 10.7  -Increase Metformin to 1000mg BID and monitor for GI Side effect. If develops, decrease down to 500mg BID  -Continue Lantus 16 units QHS + premeal 5units  -FS and ISS    DVT PPX  - Lovenox  - SCDs  - TEDs

## 2022-02-21 NOTE — PROGRESS NOTE ADULT - SUBJECTIVE AND OBJECTIVE BOX
Patient is a 66y old  Male who presents with a chief complaint of Glioblastoma (21 Feb 2022 07:29)      SUBJECTIVE / OVERNIGHT EVENTS:  Pt seen and examined at bedside. No acute events overnight.  Pt denies cp, palpitations, sob, abd pain, N/V, fever, chills.    ROS:  All other review of systems negative    Allergies    No Known Allergies    Intolerances        MEDICATIONS  (STANDING):  atorvastatin 20 milliGRAM(s) Oral at bedtime  dexAMETHasone     Tablet 2 milliGRAM(s) Oral two times a day  dextrose 40% Gel 15 Gram(s) Oral once  dextrose 5%. 1000 milliLiter(s) (50 mL/Hr) IV Continuous <Continuous>  dextrose 5%. 1000 milliLiter(s) (100 mL/Hr) IV Continuous <Continuous>  dextrose 50% Injectable 25 Gram(s) IV Push once  dextrose 50% Injectable 12.5 Gram(s) IV Push once  dextrose 50% Injectable 25 Gram(s) IV Push once  enoxaparin Injectable 40 milliGRAM(s) SubCutaneous daily  escitalopram 5 milliGRAM(s) Oral daily  fluticasone propionate 50 MICROgram(s)/spray Nasal Spray 1 Spray(s) Both Nostrils two times a day  gabapentin 100 milliGRAM(s) Oral at bedtime  glucagon  Injectable 1 milliGRAM(s) IntraMuscular once  insulin glargine Injectable (LANTUS) 16 Unit(s) SubCutaneous at bedtime  insulin lispro (ADMELOG) corrective regimen sliding scale   SubCutaneous three times a day before meals  insulin lispro Injectable (ADMELOG) 5 Unit(s) SubCutaneous three times a day before meals  lacosamide Solution 100 milliGRAM(s) Oral two times a day  metFORMIN 500 milliGRAM(s) Oral two times a day  pantoprazole    Tablet 40 milliGRAM(s) Oral before breakfast  polyethylene glycol 3350 17 Gram(s) Oral every 12 hours  senna 2 Tablet(s) Oral at bedtime  sodium chloride 1 Gram(s) Oral every 6 hours    MEDICATIONS  (PRN):  acetaminophen     Tablet .. 650 milliGRAM(s) Oral every 6 hours PRN Temp greater or equal to 38C (100.4F), Mild Pain (1 - 3)  benzocaine 15 mG/menthol 3.6 mG Lozenge 1 Lozenge Oral every 4 hours PRN Sore Throat      Vital Signs Last 24 Hrs  T(C): 36.8 (21 Feb 2022 07:03), Max: 36.8 (21 Feb 2022 07:03)  T(F): 98.2 (21 Feb 2022 07:03), Max: 98.2 (21 Feb 2022 07:03)  HR: 67 (21 Feb 2022 07:03) (67 - 86)  BP: 103/65 (21 Feb 2022 07:03) (102/55 - 103/65)  BP(mean): --  RR: 15 (21 Feb 2022 07:03) (15 - 16)  SpO2: 96% (21 Feb 2022 07:03) (95% - 96%)  CAPILLARY BLOOD GLUCOSE      POCT Blood Glucose.: 181 mg/dL (21 Feb 2022 07:00)  POCT Blood Glucose.: 183 mg/dL (20 Feb 2022 21:24)  POCT Blood Glucose.: 146 mg/dL (20 Feb 2022 16:21)  POCT Blood Glucose.: 255 mg/dL (20 Feb 2022 11:18)    I&O's Summary    20 Feb 2022 07:01  -  21 Feb 2022 07:00  --------------------------------------------------------  IN: 0 mL / OUT: 400 mL / NET: -400 mL        PHYSICAL EXAM:  GENERAL: NAD, well-developed  HEAD:  Surgical incision site c/d/i s/p staple removal  NECK: Supple, No JVD  CHEST/LUNG: Clear to auscultation bilaterally; No wheeze, nonlabored breathing  HEART: Regular rate and rhythm; No murmurs, rubs, or gallops  ABDOMEN: Soft, Nontender, Nondistended; Bowel sounds present  EXTREMITIES:  2+ Peripheral Pulses, No clubbing, cyanosis, or edema  NEUROLOGY: AAOx3, non-focal  PSYCH: calm, appropriate mood    LABS:                        11.8   6.58  )-----------( 145      ( 21 Feb 2022 06:41 )             36.9     02-21    137  |  99  |  19  ----------------------------<  196<H>  4.1   |  30  |  0.99    Ca    9.0      21 Feb 2022 06:41    TPro  6.8  /  Alb  2.5<L>  /  TBili  0.5  /  DBili  x   /  AST  9<L>  /  ALT  18  /  AlkPhos  70  02-21              RADIOLOGY & ADDITIONAL TESTS:  Results Reviewed:   Imaging Personally Reviewed:  Electrocardiogram Personally Reviewed:    COORDINATION OF CARE:  Care Discussed with Consultants/Other Providers [Y/N]:  Prior or Outpatient Records Reviewed [Y/N]:

## 2022-02-22 ENCOUNTER — TRANSCRIPTION ENCOUNTER (OUTPATIENT)
Age: 67
End: 2022-02-22

## 2022-02-22 LAB
GLUCOSE BLDC GLUCOMTR-MCNC: 132 MG/DL — HIGH (ref 70–99)
GLUCOSE BLDC GLUCOMTR-MCNC: 135 MG/DL — HIGH (ref 70–99)
GLUCOSE BLDC GLUCOMTR-MCNC: 270 MG/DL — HIGH (ref 70–99)
GLUCOSE BLDC GLUCOMTR-MCNC: 78 MG/DL — SIGNIFICANT CHANGE UP (ref 70–99)

## 2022-02-22 PROCEDURE — 99233 SBSQ HOSP IP/OBS HIGH 50: CPT

## 2022-02-22 PROCEDURE — 99232 SBSQ HOSP IP/OBS MODERATE 35: CPT

## 2022-02-22 RX ADMIN — Medication 6: at 11:28

## 2022-02-22 RX ADMIN — ATORVASTATIN CALCIUM 20 MILLIGRAM(S): 80 TABLET, FILM COATED ORAL at 21:52

## 2022-02-22 RX ADMIN — ESCITALOPRAM OXALATE 5 MILLIGRAM(S): 10 TABLET, FILM COATED ORAL at 11:28

## 2022-02-22 RX ADMIN — Medication 1 SPRAY(S): at 06:13

## 2022-02-22 RX ADMIN — ENOXAPARIN SODIUM 40 MILLIGRAM(S): 100 INJECTION SUBCUTANEOUS at 11:28

## 2022-02-22 RX ADMIN — POLYETHYLENE GLYCOL 3350 17 GRAM(S): 17 POWDER, FOR SOLUTION ORAL at 07:23

## 2022-02-22 RX ADMIN — SODIUM CHLORIDE 1 GRAM(S): 9 INJECTION INTRAMUSCULAR; INTRAVENOUS; SUBCUTANEOUS at 14:05

## 2022-02-22 RX ADMIN — Medication 5 UNIT(S): at 11:28

## 2022-02-22 RX ADMIN — Medication 2 MILLIGRAM(S): at 17:02

## 2022-02-22 RX ADMIN — Medication 2 MILLIGRAM(S): at 06:13

## 2022-02-22 RX ADMIN — LACOSAMIDE 100 MILLIGRAM(S): 50 TABLET ORAL at 06:13

## 2022-02-22 RX ADMIN — LACOSAMIDE 100 MILLIGRAM(S): 50 TABLET ORAL at 17:02

## 2022-02-22 RX ADMIN — SENNA PLUS 2 TABLET(S): 8.6 TABLET ORAL at 21:52

## 2022-02-22 RX ADMIN — INSULIN GLARGINE 16 UNIT(S): 100 INJECTION, SOLUTION SUBCUTANEOUS at 21:52

## 2022-02-22 RX ADMIN — METFORMIN HYDROCHLORIDE 1000 MILLIGRAM(S): 850 TABLET ORAL at 17:02

## 2022-02-22 RX ADMIN — SODIUM CHLORIDE 1 GRAM(S): 9 INJECTION INTRAMUSCULAR; INTRAVENOUS; SUBCUTANEOUS at 21:52

## 2022-02-22 RX ADMIN — Medication 5 UNIT(S): at 07:24

## 2022-02-22 RX ADMIN — PANTOPRAZOLE SODIUM 40 MILLIGRAM(S): 20 TABLET, DELAYED RELEASE ORAL at 06:13

## 2022-02-22 RX ADMIN — METFORMIN HYDROCHLORIDE 1000 MILLIGRAM(S): 850 TABLET ORAL at 08:27

## 2022-02-22 RX ADMIN — Medication 1 SPRAY(S): at 17:02

## 2022-02-22 RX ADMIN — SODIUM CHLORIDE 1 GRAM(S): 9 INJECTION INTRAMUSCULAR; INTRAVENOUS; SUBCUTANEOUS at 06:13

## 2022-02-22 RX ADMIN — GABAPENTIN 100 MILLIGRAM(S): 400 CAPSULE ORAL at 21:52

## 2022-02-22 NOTE — PROGRESS NOTE ADULT - SUBJECTIVE AND OBJECTIVE BOX
CHIEF COMPLAINT:  nonproductive cough x sore throat + pain with swallowing and prolong talking x 5-7 days.  Encouraged hydration and deep breathing exercises. Intermittent HA 2-3/10 - tolerable.  Denies CP, SOB, nausea/vomiting, abd pain, or dysuria/frequency.  LBM 2 days ago.      HISTORY OF PRESENT ILLNESS  This is a 67 YO male with PMH of  HTN, DM2, and known brain mass. He was in the [place] for vacation in early January, when pt was noted to be confused and not answering appropriately, so an MRI was done on Jan 7 showed L frontal enhancing mass with significant edema. He presented to [facility] on 1/24 for further evaluation and repeat CTH showed significant vasogenic edema and 1.5cm MLS. He underwent 1/27/22 for left sided craniotomy for tumor resection with gleolan.  Pt was started on decadron and Vimpat also.  Post op course was complicated by possible seizures with EEG negative. Hyponatermia with salt tabs added to regimen. 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 He was evaluated by PMR and was recommended for acute inpatient rehab. He was admitted to [facility] on 02/08/22, however COVID test came back positive. COVID tests from 02/04 and 02/06/22 were negative. Patient remains asymptomatic.     COVID swab from 2/9/2022 and 2/10/2022 are both negative. Per medical team, he does not need isolation at this time. COVID swab from 2/8/2022 was likely a false positive. He was seen by ENT for hoarseness - vocal cords mobile but with incomplete closure and Continue with speech and swallow therapy. Follow up with ENT/Laryngology, Dr. Graham Bergeron [phone] 2 weeks after discharge. Patient stable to return to [facility]  on 2/11/22.    PAST MEDICAL & SURGICAL HISTORY:  Diabetes mellitus  Hypertension  Glioblastoma  just diagnosed - 1/22/22  S/P craniotomy       REVIEW OF SYMPTOMS  [X] Constitutional WNL          [X] Cardio WNL            [X] Resp WNL           [X] GI WNL; LBM 2/20            [X]  WNL                   [X] Heme WNL              [X] Endo WNL                       [X] Skin WNL                 [X] MSK WNL            [X] Neuro - Intermittent HA    [X] Cognitive WNL        [X] Psych WNL      VITALS  Vital Signs Last 24 Hrs  T(C): 36.8 (22 Feb 2022 07:25), Max: 36.9 (21 Feb 2022 19:47)  T(F): 98.2 (22 Feb 2022 07:25), Max: 98.5 (21 Feb 2022 19:47)  HR: 73 (22 Feb 2022 07:25) (73 - 80)  BP: 110/62 (22 Feb 2022 07:25) (110/62 - 116/74)  RR: 16 (22 Feb 2022 07:25) (16 - 16)  SpO2: 94% (22 Feb 2022 07:25) (94% - 95%)    PHYSICAL EXAM  Constitutional - NAD, Comfortable  HEENT - NCAT, EOMI  Neck - Supple, No limited ROM  Chest - CTA bilaterally  Cardiovascular - RRR, S1S2  Abdomen -  Soft, NTND  Extremities - No C/C/E, No calf tenderness   Neurologic Exam -  no new  focal deficits                      RECENT LABS   LAB                        11.8   6.58  )-----------( 145      ( 21 Feb 2022 06:41 )             36.9     02-21    137  |  99  |  19  ----------------------------<  196<H>  4.1   |  30  |  0.99    Ca    9.0      21 Feb 2022 06:41    TPro  6.8  /  Alb  2.5<L>  /  TBili  0.5  /  DBili  x   /  AST  9<L>  /  ALT  18  /  AlkPhos  70  02-21    LIVER FUNCTIONS - ( 21 Feb 2022 06:41 )  Alb: 2.5 g/dL / Pro: 6.8 g/dL / ALK PHOS: 70 U/L / ALT: 18 U/L / AST: 9 U/L / GGT: x             CAPILLARY BLOOD GLUCOSE  POCT Blood Glucose.: 132 mg/dL (22 Feb 2022 07:18)  POCT Blood Glucose.: 109 mg/dL (21 Feb 2022 21:50)  POCT Blood Glucose.: 155 mg/dL (21 Feb 2022 16:13)  POCT Blood Glucose.: 176 mg/dL (21 Feb 2022 11:08)      MEDICATIONS  (STANDING):  atorvastatin 20 milliGRAM(s) Oral at bedtime  dexAMETHasone     Tablet 2 milliGRAM(s) Oral two times a day  dextrose 40% Gel 15 Gram(s) Oral once  dextrose 5%. 1000 milliLiter(s) (50 mL/Hr) IV Continuous <Continuous>  dextrose 5%. 1000 milliLiter(s) (100 mL/Hr) IV Continuous <Continuous>  dextrose 50% Injectable 25 Gram(s) IV Push once  dextrose 50% Injectable 12.5 Gram(s) IV Push once  dextrose 50% Injectable 25 Gram(s) IV Push once  enoxaparin Injectable 40 milliGRAM(s) SubCutaneous daily  escitalopram 5 milliGRAM(s) Oral daily  fluticasone propionate 50 MICROgram(s)/spray Nasal Spray 1 Spray(s) Both Nostrils two times a day  gabapentin 100 milliGRAM(s) Oral at bedtime  glucagon  Injectable 1 milliGRAM(s) IntraMuscular once  insulin glargine Injectable (LANTUS) 16 Unit(s) SubCutaneous at bedtime  insulin lispro (ADMELOG) corrective regimen sliding scale   SubCutaneous three times a day before meals  insulin lispro Injectable (ADMELOG) 5 Unit(s) SubCutaneous three times a day before meals  lacosamide Solution 100 milliGRAM(s) Oral two times a day  metFORMIN 1000 milliGRAM(s) Oral two times a day  pantoprazole    Tablet 40 milliGRAM(s) Oral before breakfast  polyethylene glycol 3350 17 Gram(s) Oral every 12 hours  senna 2 Tablet(s) Oral at bedtime  sodium chloride 1 Gram(s) Oral every 8 hours    MEDICATIONS  (PRN):  acetaminophen     Tablet .. 650 milliGRAM(s) Oral every 6 hours PRN Temp greater or equal to 38C (100.4F), Mild Pain (1 - 3)  benzocaine 15 mG/menthol 3.6 mG Lozenge 1 Lozenge Oral every 4 hours PRN Sore Throat     HISTORY OF PRESENT ILLNESS  This is a 67 YO male with PMH of  HTN, DM2, and known brain mass. He was in the [place] for vacation in early January, when pt was noted to be confused and not answering appropriately, so an MRI was done on Jan 7 showed L frontal enhancing mass with significant edema. He presented to [facility] on 1/24 for further evaluation and repeat CTH showed significant vasogenic edema and 1.5cm MLS. He underwent 1/27/22 for left sided craniotomy for tumor resection with gleolan.  Pt was started on decadron and Vimpat also.  Post op course was complicated by possible seizures with EEG negative. Hyponatermia with salt tabs added to regimen. 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 He was evaluated by PMR and was recommended for acute inpatient rehab. He was admitted to [facility] on 02/08/22, however COVID test came back positive. COVID tests from 02/04 and 02/06/22 were negative. Patient remains asymptomatic.     COVID swab from 2/9/2022 and 2/10/2022 are both negative. Per medical team, he does not need isolation at this time. COVID swab from 2/8/2022 was likely a false positive. He was seen by ENT for hoarseness - vocal cords mobile but with incomplete closure and Continue with speech and swallow therapy. Follow up with ENT/Laryngology, Dr. Graham Bergeron [phone] 2 weeks after discharge. Patient stable to return to [facility]  on 2/11/22.    CHIEF COMPLAINT:    Seen and examined  Reports nonproductive cough x sore throat + pain with swallowing and prolong talking x 5-7 days, and hx of hoarseness preceeding acute rehab admission  Had ENT review earlier and symptoms attributed to incomplete vocal cord closure with plan for ENT f/u on discharge    Encouraged hydration and deep breathing exercises.     Also report intermittent HA 2-3/10 - tolerable, no visual symptoms, will be treated with PRN analgesics  otherwise no acute distress at this time    Denies CP, SOB, nausea/vomiting, abd pain, or dysuria/frequency.  LBM 2 days ago.  REVIEW OF SYMPTOMS  [X] Constitutional WNL          [X] Cardio WNL            [X] Resp WNL           [X] GI WNL; LBM 2/20            [X]  WNL                   [X] Heme WNL              [X] Endo WNL                       [X] Skin WNL                 [X] MSK WNL            [X] Neuro - Intermittent HA    [X] Cognitive WNL        [X] Psych WNL    Labs today-unremarkable    Therapy--engaging, ambulating functional distance with no gait aid. working on endurance and stairs negotiating      VITALS  Vital Signs Last 24 Hrs  T(C): 36.8 (22 Feb 2022 07:25), Max: 36.9 (21 Feb 2022 19:47)  T(F): 98.2 (22 Feb 2022 07:25), Max: 98.5 (21 Feb 2022 19:47)  HR: 73 (22 Feb 2022 07:25) (73 - 80)  BP: 110/62 (22 Feb 2022 07:25) (110/62 - 116/74)  RR: 16 (22 Feb 2022 07:25) (16 - 16)  SpO2: 94% (22 Feb 2022 07:25) (94% - 95%)    PHYSICAL EXAM  Constitutional - NAD, Comfortable  HEENT - NCAT, EOMI  Neck - Supple, No limited ROM  Chest - Clear  Cardiovascular - RRR, S1S2  Abdomen -  Soft, NTND  Extremities - No C/C/E, No calf tenderness   Scalp surgical wound healing well, sutures already removed, edges together    Neurologic Exam -  no new  focal deficits                      RECENT LABS   LAB                        11.8   6.58  )-----------( 145      ( 21 Feb 2022 06:41 )             36.9     02-21    137  |  99  |  19  ----------------------------<  196<H>  4.1   |  30  |  0.99    Ca    9.0      21 Feb 2022 06:41    TPro  6.8  /  Alb  2.5<L>  /  TBili  0.5  /  DBili  x   /  AST  9<L>  /  ALT  18  /  AlkPhos  70  02-21    LIVER FUNCTIONS - ( 21 Feb 2022 06:41 )  Alb: 2.5 g/dL / Pro: 6.8 g/dL / ALK PHOS: 70 U/L / ALT: 18 U/L / AST: 9 U/L / GGT: x             CAPILLARY BLOOD GLUCOSE  POCT Blood Glucose.: 132 mg/dL (22 Feb 2022 07:18)  POCT Blood Glucose.: 109 mg/dL (21 Feb 2022 21:50)  POCT Blood Glucose.: 155 mg/dL (21 Feb 2022 16:13)  POCT Blood Glucose.: 176 mg/dL (21 Feb 2022 11:08)      MEDICATIONS  (STANDING):  atorvastatin 20 milliGRAM(s) Oral at bedtime  dexAMETHasone     Tablet 2 milliGRAM(s) Oral two times a day  dextrose 40% Gel 15 Gram(s) Oral once  dextrose 5%. 1000 milliLiter(s) (50 mL/Hr) IV Continuous <Continuous>  dextrose 5%. 1000 milliLiter(s) (100 mL/Hr) IV Continuous <Continuous>  dextrose 50% Injectable 25 Gram(s) IV Push once  dextrose 50% Injectable 12.5 Gram(s) IV Push once  dextrose 50% Injectable 25 Gram(s) IV Push once  enoxaparin Injectable 40 milliGRAM(s) SubCutaneous daily  escitalopram 5 milliGRAM(s) Oral daily  fluticasone propionate 50 MICROgram(s)/spray Nasal Spray 1 Spray(s) Both Nostrils two times a day  gabapentin 100 milliGRAM(s) Oral at bedtime  glucagon  Injectable 1 milliGRAM(s) IntraMuscular once  insulin glargine Injectable (LANTUS) 16 Unit(s) SubCutaneous at bedtime  insulin lispro (ADMELOG) corrective regimen sliding scale   SubCutaneous three times a day before meals  insulin lispro Injectable (ADMELOG) 5 Unit(s) SubCutaneous three times a day before meals  lacosamide Solution 100 milliGRAM(s) Oral two times a day  metFORMIN 1000 milliGRAM(s) Oral two times a day  pantoprazole    Tablet 40 milliGRAM(s) Oral before breakfast  polyethylene glycol 3350 17 Gram(s) Oral every 12 hours  senna 2 Tablet(s) Oral at bedtime  sodium chloride 1 Gram(s) Oral every 8 hours    MEDICATIONS  (PRN):  acetaminophen     Tablet .. 650 milliGRAM(s) Oral every 6 hours PRN Temp greater or equal to 38C (100.4F), Mild Pain (1 - 3)  benzocaine 15 mG/menthol 3.6 mG Lozenge 1 Lozenge Oral every 4 hours PRN Sore Throat

## 2022-02-22 NOTE — PROGRESS NOTE ADULT - ASSESSMENT
67 y/o man with PMH of  HTN, DM2, and recent discovery of brain Glioblastoma s/p 1/27/22 for left sided craniotomy. Post-op course was complicated by possible hyponatermia with salt tabs added to regimen. 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 He was admitted to [facility] for functional and gair impairment     Functional and gait instability:  - C/w PT/OT per primary team     Left frontal GBM s/p craniotomy for resection with right sided drift  -Continue Vimpat 100mg BID for seizure prophylaxis  -Continue Decadron slow taper- now on 2mg BID - to maintain until follow up with neurosurgery   -Will require follow up with neuro-oncology     Hyponatremia  -Resolved  -Continue fluid restriction  -Continue salt tabs 1G Q8h. Will continue to taper and monitor Na levels    HTN  -lisinopril has been held due to soft BP     HLD   -Continue atorvastatin 20mg daily    Type 2 DM uncontrolled  -A1c is 10.7  -Continue Metformin 1000mg BID. Monitor for GI Side effect. If develops, decrease down to 500mg BID  -Continue Lantus 16 units QHS + premeal 5units  -FS and ISS    DVT PPX  - Lovenox  - SCDs  - TEDs

## 2022-02-22 NOTE — PROGRESS NOTE ADULT - ASSESSMENT
ASSESSMENT/PLAN  66 year old male with PMH of  HTN, DM2, and known brain mass. He was in the [place] for vacation in early January, when he was noted to be confused and not answering appropriately, so an MRI was done on Jan 7 showed L frontal enhancing mass with significant edema. He presented to [facility] on 1/24 for further evaluation and repeat CTH which  showed significant vasogenic edema and 1.5cm MLS. He underwent 1/27/22 for left sided craniotomy for tumor resection with gleolan. Post-op course was complicated by possible seizures with EEG negative, hyponatermia with salt tabs added to regimen. 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 He was admitted to [facility] but tested positive for covid. He was asymptomatic but trasnferred to medical floor for quarantine. His subsequent covid swabs came back negative. Patient was deemed stable to return to [facility]  on 2/11/22. Patient  with gait Instability, ADL impairments and Functional impairments.    #Left frontal tumor s/p craniotomy for resection with right sided drift, Gait Instability, ADL impairments and Functional impairments  - Continue Comprehensive Rehab Program of PT/OT/SLP  - Monitor incision -staples removed 2/14 - tolerated well  -Continue Vimpat 100mg BID for seizure prophylaxis  -Continue Decadron slow taper- now on 2mg Q12h - to maintain until follow up with neurosurgery   -Will require follow up with neuro-oncology     # depression  - start Lexapro  - Neuropsychology  input     #Hyponatremia  -Continue salt tabs 1G Q6h  - monitor NA  - Na 137 (2/21)    #HTN  -Home med: lisinopril 5mg daily  -BP controlled (103/65 - 116/74) 2/22    #HLD   -Continue atorvastatin 20mg daily    #Type 2 DM  - A1c is 10.7 (1/25)  -FS and ISS  - Lantus 16 units at bedtime  - Admelog 5 AC  - metformin 500mg BID - inc 1000 BID (2/21)    #Pain control  - Tylenol PRN    #Covid 19 - false positive   - positive on 2/8- likely false positive  - Negative  2/9 and 2/10  - Continues to be asymptomatic    #GI/Bowel Mgmt   - Senna at bedtime   - Miralax BID    #Bladder management  -PVR q 8 hours (SC if > 400) - PVR low, dc  -Monitor UO    #DVT  - Lovenox  - SCDs  - TEDs     #Skin:  -Incisional wound care per nursing. Surgical staples removed, wound intact    FEN   - Diet -regular with thin liquids [CCHO, DASH/TLC]    -Passed MBS 2/16    - Dysphagia  SLP - evaluation and treatment    Outpatient Follow-up (Specialty/Name of physician):  Parker Gupta)  Neurosurgery  [address] [place] [id number]  Phone: [phone]  Fax: [phone]  Follow Up Time:     Matthew Hill)  Neurology  [facility], [address] [place] [address]  Phone: [phone]  Fax: [phone]  Follow Up Time:     Kristopher Guevara  RADIATION ONCOLOGY  [address] [place] [address] Entrance A - [place] [address]  Phone: [phone]  Fax: [phone]    TEAM MEETING 2/16/22  SW:  moving in with daughter, family training needed  OT: Mod I for eating/grooming, Sv bathing and transfers, min for toileting  PT: SV for transfers, 100' amb no device with SV, 12 stairs with SV  SLP: MBS passed for regular with thins, delayed emptying may need GI as outpatient  barriers: cognition, comprehension, needs 24/7 supervision, impulsive   goals: Mod I for adls and transfers, SV for ambulation  EDOD: Home with 24/7 2/25     Daughter Roxanne [phone] updated on progress: patient's needs, prognosis and discharge plan discussed - she understands that patient will need  supervision and assistance at home after discharge  ASSESSMENT/PLAN  66 year old male with PMH of  HTN, DM2, and known brain mass. He was in the [place] for vacation in early January, when he was noted to be confused and not answering appropriately, so an MRI was done on Jan 7 showed L frontal enhancing mass with significant edema. He presented to [facility] on 1/24 for further evaluation and repeat CTH which  showed significant vasogenic edema and 1.5cm MLS. He underwent 1/27/22 for left sided craniotomy for tumor resection with gleolan. Post-op course was complicated by possible seizures with EEG negative, hyponatermia with salt tabs added to regimen. 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 He was admitted to [facility] but tested positive for covid. He was asymptomatic but trasnferred to medical floor for quarantine. His subsequent covid swabs came back negative. Patient was deemed stable to return to [facility]  on 2/11/22. Patient  with gait Instability, ADL impairments and Functional impairments.    #Left frontal tumor s/p craniotomy for resection with right sided drift, Gait Instability, ADL impairments and Functional impairments  - Continue Comprehensive Rehab Program of PT/OT/SLP  - Monitor incision -staples removed 2/14 - tolerated well  -Continue Vimpat 100mg BID for seizure prophylaxis  -Continue Decadron slow taper- now on 2mg Q12h - to maintain until follow up with neurosurgery   -Will require follow up with neuro-oncology     #cough/hoarseness   - has Cepacol PRN  - Seen by ENT - has vocal cords mobile but with incomplete closure  - will follow up with Dr. Graham Bergeron [phone] 2 weeks after discharge.     # depression  - start Lexapro  - Neuropsychology  input     #Hyponatremia  -Continue salt tabs 1G Q6h  - monitor NA  - Na 137 (2/21)    #HTN  -Home med: lisinopril 5mg daily  -BP controlled (103/65 - 116/74) 2/22    #HLD   -Continue atorvastatin 20mg daily    #Type 2 DM  - A1c is 10.7 (1/25)  -FS and ISS  - Lantus 16 units at bedtime  - Admelog 5 AC  - metformin 500mg BID - inc 1000 BID (2/21)    #Pain control  - Tylenol PRN    #Covid 19 - false positive   - positive on 2/8- likely false positive  - Negative  2/9 and 2/10  - Continues to be asymptomatic    #GI/Bowel Mgmt   - Senna at bedtime   - Miralax BID    #Bladder management  -PVR q 8 hours (SC if > 400) - PVR low, dc  -Monitor UO    #DVT  - Lovenox  - SCDs  - TEDs     #Skin:  -Incisional wound care per nursing. Surgical staples removed, wound intact    FEN   - Diet -regular with thin liquids [CCHO, DASH/TLC]    -Passed MBS 2/16    - Dysphagia  SLP - evaluation and treatment    Outpatient Follow-up (Specialty/Name of physician):  Parker Gupta)  Neurosurgery  [address] [place] [address]  Phone: [phone]  Fax: [phone]  Follow Up Time:     Matthew Hill)  Neurology  [facility], [address] [place] [address]  Phone: [phone]  Fax: [phone]  Follow Up Time:     Kristopher Guevara  RADIATION ONCOLOGY  [address] [place] [address] A [place] [address]  Phone: [phone]  Fax: [phone]    TEAM MEETING 2/16/22  SW:  moving in with daughter, family training needed  OT: Mod I for eating/grooming, Sv bathing and transfers, min for toileting  PT: SV for transfers, 100' amb no device with SV, 12 stairs with SV  SLP: MBS passed for regular with thins, delayed emptying may need GI as outpatient  barriers: cognition, comprehension, needs 24/7 supervision, impulsive   goals: Mod I for adls and transfers, SV for ambulation  EDOD: Home with 24/7 2/25     Daughter Roxanne [phone] updated on progress: patient's needs, prognosis and discharge plan discussed - she understands that patient will need  supervision and assistance at home after discharge  ASSESSMENT/PLAN  66 year old male with PMH of  HTN, DM2, and known brain mass. He was in the [place] for vacation in early January, when he was noted to be confused and not answering appropriately, so an MRI was done on Jan 7 showed L frontal enhancing mass with significant edema. He presented to [facility] on 1/24 for further evaluation and repeat CTH which  showed significant vasogenic edema and 1.5cm MLS. He underwent 1/27/22 for left sided craniotomy for tumor resection with gleolan. Post-op course was complicated by possible seizures with EEG negative, hyponatermia with salt tabs added to regimen. 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 He was admitted to [facility] but tested positive for covid. He was asymptomatic but trasnferred to medical floor for quarantine. His subsequent covid swabs came back negative. Patient was deemed stable to return to [facility]  on 2/11/22. Patient  with gait Instability, ADL impairments and Functional impairments.    * Hoarseness--c/w cepacol lozenges and ENT f/u as planned  * Therapy on endurance and progressive ambulation/stairs negotiating to continue     #Left frontal tumor s/p craniotomy for resection with right sided drift, Gait Instability, ADL impairments and Functional impairments  - Continue Comprehensive Rehab Program of PT/OT/SLP  - Monitor incision -staples removed 2/14 - tolerated well  -Continue Vimpat 100mg BID for seizure prophylaxis  -Continue Decadron slow taper- now on 2mg Q12h - to maintain until follow up with neurosurgery   -Will require follow up with neuro-oncology     #cough/hoarseness   - has Cepacol PRN  - Seen by ENT - has vocal cords mobile but with incomplete closure  - will follow up with Dr. Graham Bergeron [phone] 2 weeks after discharge.     # depression  - start Lexapro  - Neuropsychology  input     #Hyponatremia  -Continue salt tabs 1G Q6h  - monitor NA  - Na 137 (2/21)    #HTN  -Home med: lisinopril 5mg daily  -BP controlled (103/65 - 116/74) 2/22    #HLD   -Continue atorvastatin 20mg daily    #Type 2 DM  - A1c is 10.7 (1/25)  -FS and ISS  - Lantus 16 units at bedtime  - Admelog 5 AC  - metformin 500mg BID - inc 1000 BID (2/21)    #Pain control  - Tylenol PRN    #Covid 19 - false positive   - positive on 2/8- likely false positive  - Negative  2/9 and 2/10  - Continues to be asymptomatic    #GI/Bowel Mgmt   - Senna at bedtime   - Miralax BID    #Bladder management  -PVR q 8 hours (SC if > 400) - PVR low, dc  -Monitor UO    #DVT  - Lovenox  - SCDs  - TEDs     #Skin:  -Incisional wound care per nursing. Surgical staples removed, wound intact    FEN   - Diet -regular with thin liquids [CCHO, DASH/TLC]    -Passed MBS 2/16    - Dysphagia  SLP - evaluation and treatment    Outpatient Follow-up (Specialty/Name of physician):  Parker Gupta)  Neurosurgery  [address] [place] [address]  Phone: [phone]  Fax: [phone]  Follow Up Time:     Matthew Hill)  Neurology  [facility], [address] [place] [address]  Phone: [phone]  Fax: [phone]  Follow Up Time:     Kristopher Guevara  RADIATION ONCOLOGY  [address] [place] [address] A [place] [address]  Phone: [phone]  Fax: [phone]    TEAM MEETING 2/16/22  SW:  moving in with daughter, family training needed  OT: Mod I for eating/grooming, Sv bathing and transfers, min for toileting  PT: SV for transfers, 100' amb no device with SV, 12 stairs with SV  SLP: [facility] passed for regular with thins, delayed emptying may need GI as outpatient  barriers: cognition, comprehension, needs 24/7 supervision, impulsive   goals: Mod I for adls and transfers, SV for ambulation  EDOD: Home with 24/7 2/25     Daughter Roxanne [phone] updated on progress: patient's needs, prognosis and discharge plan discussed - she understands that patient will need  supervision and assistance at home after discharge

## 2022-02-23 ENCOUNTER — TRANSCRIPTION ENCOUNTER (OUTPATIENT)
Age: 67
End: 2022-02-23

## 2022-02-23 ENCOUNTER — APPOINTMENT (OUTPATIENT)
Dept: NEUROSURGERY | Facility: CLINIC | Age: 67
End: 2022-02-23

## 2022-02-23 LAB
GLUCOSE BLDC GLUCOMTR-MCNC: 127 MG/DL — HIGH (ref 70–99)
GLUCOSE BLDC GLUCOMTR-MCNC: 159 MG/DL — HIGH (ref 70–99)
GLUCOSE BLDC GLUCOMTR-MCNC: 215 MG/DL — HIGH (ref 70–99)
GLUCOSE BLDC GLUCOMTR-MCNC: 239 MG/DL — HIGH (ref 70–99)

## 2022-02-23 PROCEDURE — 99232 SBSQ HOSP IP/OBS MODERATE 35: CPT

## 2022-02-23 RX ORDER — ENOXAPARIN SODIUM 100 MG/ML
16 INJECTION SUBCUTANEOUS
Qty: 3 | Refills: 0
Start: 2022-02-23 | End: 2022-03-24

## 2022-02-23 RX ORDER — FLUTICASONE PROPIONATE 50 MCG
1 SPRAY, SUSPENSION NASAL
Qty: 1 | Refills: 0
Start: 2022-02-23 | End: 2022-03-24

## 2022-02-23 RX ORDER — PANTOPRAZOLE SODIUM 20 MG/1
1 TABLET, DELAYED RELEASE ORAL
Qty: 30 | Refills: 0
Start: 2022-02-23 | End: 2022-03-24

## 2022-02-23 RX ORDER — GABAPENTIN 400 MG/1
1 CAPSULE ORAL
Qty: 30 | Refills: 0
Start: 2022-02-23 | End: 2022-03-24

## 2022-02-23 RX ORDER — LACOSAMIDE 50 MG/1
1 TABLET ORAL
Qty: 60 | Refills: 0
Start: 2022-02-23 | End: 2022-03-24

## 2022-02-23 RX ORDER — DEXAMETHASONE 0.5 MG/5ML
1 ELIXIR ORAL
Qty: 60 | Refills: 0
Start: 2022-02-23 | End: 2022-03-24

## 2022-02-23 RX ORDER — POLYETHYLENE GLYCOL 3350 17 G/17G
17 POWDER, FOR SOLUTION ORAL
Qty: 0 | Refills: 0 | DISCHARGE
Start: 2022-02-23

## 2022-02-23 RX ORDER — INSULIN LISPRO 100/ML
5 VIAL (ML) SUBCUTANEOUS
Qty: 3 | Refills: 0
Start: 2022-02-23 | End: 2022-03-24

## 2022-02-23 RX ORDER — BENZOCAINE AND MENTHOL 5; 1 G/100ML; G/100ML
1 LIQUID ORAL
Qty: 0 | Refills: 0 | DISCHARGE
Start: 2022-02-23

## 2022-02-23 RX ORDER — LANOLIN ALCOHOL/MO/W.PET/CERES
1 CREAM (GRAM) TOPICAL
Qty: 0 | Refills: 0 | DISCHARGE
Start: 2022-02-23

## 2022-02-23 RX ORDER — ATORVASTATIN CALCIUM 80 MG/1
1 TABLET, FILM COATED ORAL
Qty: 30 | Refills: 0
Start: 2022-02-23 | End: 2022-03-24

## 2022-02-23 RX ORDER — SENNA PLUS 8.6 MG/1
2 TABLET ORAL
Qty: 0 | Refills: 0 | DISCHARGE
Start: 2022-02-23

## 2022-02-23 RX ORDER — ACETAMINOPHEN 500 MG
2 TABLET ORAL
Qty: 0 | Refills: 0 | DISCHARGE
Start: 2022-02-23

## 2022-02-23 RX ORDER — METFORMIN HYDROCHLORIDE 850 MG/1
1 TABLET ORAL
Qty: 60 | Refills: 0
Start: 2022-02-23 | End: 2022-03-24

## 2022-02-23 RX ORDER — ESCITALOPRAM OXALATE 10 MG/1
1 TABLET, FILM COATED ORAL
Qty: 30 | Refills: 0
Start: 2022-02-23 | End: 2022-03-24

## 2022-02-23 RX ORDER — LANOLIN ALCOHOL/MO/W.PET/CERES
3 CREAM (GRAM) TOPICAL AT BEDTIME
Refills: 0 | Status: DISCONTINUED | OUTPATIENT
Start: 2022-02-23 | End: 2022-02-24

## 2022-02-23 RX ORDER — SODIUM CHLORIDE 9 MG/ML
1 INJECTION INTRAMUSCULAR; INTRAVENOUS; SUBCUTANEOUS
Qty: 90 | Refills: 0
Start: 2022-02-23 | End: 2022-03-24

## 2022-02-23 RX ADMIN — Medication 3 MILLIGRAM(S): at 21:51

## 2022-02-23 RX ADMIN — SODIUM CHLORIDE 1 GRAM(S): 9 INJECTION INTRAMUSCULAR; INTRAVENOUS; SUBCUTANEOUS at 21:51

## 2022-02-23 RX ADMIN — Medication 1 SPRAY(S): at 17:24

## 2022-02-23 RX ADMIN — Medication 5 UNIT(S): at 07:38

## 2022-02-23 RX ADMIN — ENOXAPARIN SODIUM 40 MILLIGRAM(S): 100 INJECTION SUBCUTANEOUS at 11:28

## 2022-02-23 RX ADMIN — SODIUM CHLORIDE 1 GRAM(S): 9 INJECTION INTRAMUSCULAR; INTRAVENOUS; SUBCUTANEOUS at 11:28

## 2022-02-23 RX ADMIN — LACOSAMIDE 100 MILLIGRAM(S): 50 TABLET ORAL at 05:36

## 2022-02-23 RX ADMIN — Medication 2 MILLIGRAM(S): at 05:36

## 2022-02-23 RX ADMIN — PANTOPRAZOLE SODIUM 40 MILLIGRAM(S): 20 TABLET, DELAYED RELEASE ORAL at 05:36

## 2022-02-23 RX ADMIN — Medication 4: at 11:27

## 2022-02-23 RX ADMIN — LACOSAMIDE 100 MILLIGRAM(S): 50 TABLET ORAL at 17:24

## 2022-02-23 RX ADMIN — SODIUM CHLORIDE 1 GRAM(S): 9 INJECTION INTRAMUSCULAR; INTRAVENOUS; SUBCUTANEOUS at 05:36

## 2022-02-23 RX ADMIN — Medication 5 UNIT(S): at 11:28

## 2022-02-23 RX ADMIN — METFORMIN HYDROCHLORIDE 1000 MILLIGRAM(S): 850 TABLET ORAL at 17:24

## 2022-02-23 RX ADMIN — Medication 650 MILLIGRAM(S): at 09:20

## 2022-02-23 RX ADMIN — METFORMIN HYDROCHLORIDE 1000 MILLIGRAM(S): 850 TABLET ORAL at 07:37

## 2022-02-23 RX ADMIN — INSULIN GLARGINE 16 UNIT(S): 100 INJECTION, SOLUTION SUBCUTANEOUS at 21:50

## 2022-02-23 RX ADMIN — POLYETHYLENE GLYCOL 3350 17 GRAM(S): 17 POWDER, FOR SOLUTION ORAL at 05:37

## 2022-02-23 RX ADMIN — GABAPENTIN 100 MILLIGRAM(S): 400 CAPSULE ORAL at 21:51

## 2022-02-23 RX ADMIN — Medication 2: at 07:38

## 2022-02-23 RX ADMIN — SENNA PLUS 2 TABLET(S): 8.6 TABLET ORAL at 21:51

## 2022-02-23 RX ADMIN — ESCITALOPRAM OXALATE 5 MILLIGRAM(S): 10 TABLET, FILM COATED ORAL at 11:28

## 2022-02-23 RX ADMIN — Medication 2 MILLIGRAM(S): at 17:24

## 2022-02-23 RX ADMIN — Medication 1 SPRAY(S): at 05:37

## 2022-02-23 RX ADMIN — Medication 650 MILLIGRAM(S): at 08:44

## 2022-02-23 RX ADMIN — Medication 5 UNIT(S): at 16:27

## 2022-02-23 RX ADMIN — ATORVASTATIN CALCIUM 20 MILLIGRAM(S): 80 TABLET, FILM COATED ORAL at 21:51

## 2022-02-23 RX ADMIN — Medication 4: at 16:27

## 2022-02-23 NOTE — DISCHARGE NOTE PROVIDER - NSDCFUADDAPPT_GEN_ALL_CORE_FT
Appt scheduled with neurosurgeon Dr. Gupta  Wednesday 2/23/22 @ 3PM  [address] [place] [address] [place] [address]  ([phone])    Follow up with your primary care doctor:  Dr. Avery Juarez [phone]

## 2022-02-23 NOTE — DISCHARGE NOTE PROVIDER - HOSPITAL COURSE
HPI:  This is a 67 YO male with PMH of  HTN, DM2, and known brain mass. He was in the [place] for vacation in early January, when pt was noted to be confused and not answering appropriately, so an MRI was done on Jan 7 showed L frontal enhancing mass with significant edema. He presented to [facility] on 1/24 for further evaluation and repeat CTH showed significant vasogenic edema and 1.5cm MLS. He underwent 1/27/22 for left sided craniotomy for tumor resection with gleolan.  Pt was started on decadron and Vimpat also.  Post op course was complicated by possible seizures with EEG negative. Hyponatermia with salt tabs added to regimen. 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 He was evaluated by PMR and was recommended for acute inpatient rehab. He was admitted to [facility] on 02/08/22, however COVID test came back positive. COVID tests from 02/04 and 02/06/22 were negative. Patient remains asymptomatic.     COVID swab from 2/9/2022 and 2/10/2022 are both negative. Per medical team, he does not need isolation at this time. COVID swab from 2/8/2022 was likely a false positive. He was seen by ENT for hoarseness - vocal cords mobile but with incomplete closure and Continue with speech and swallow therapy. Follow up with ENT/Laryngology, Dr. Graham Bergeron [phone] 2 weeks after discharge. Patient stable to return to [facility]  on 2/11/22.      Rehab course significant for adjustment to diabetes regimen.   All other medical co-morbidites were stable.    Pt tolerated course of inpatient pt/ot/slp rehab with significant functional improvements and met rehab goals prior to discharge.     Pt was medically cleared on 2/24/22 for discharge to Home.        HPI:  This is a 67 YO male with PMH of  HTN, DM2, and known brain mass. He was in the [place] for vacation in early January, when pt was noted to be confused and not answering appropriately, so an MRI was done on Jan 7 showed L frontal enhancing mass with significant edema. He presented to [facility] on 1/24 for further evaluation and repeat CTH showed significant vasogenic edema and 1.5cm MLS. He underwent 1/27/22 for left sided craniotomy for tumor resection with gleolan.  Pt was started on decadron and Vimpat also.  Post op course was complicated by possible seizures with EEG negative. Hyponatermia with salt tabs added to regimen. 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 He was evaluated by PMR and was recommended for acute inpatient rehab. He was admitted to [facility] on 02/08/22, however COVID test came back positive. COVID tests from 02/04 and 02/06/22 were negative. Patient remains asymptomatic.     COVID swab from 2/9/2022 and 2/10/2022 are both negative. Per medical team, he does not need isolation at this time. COVID swab from 2/8/2022 was likely a false positive. He was seen by ENT for hoarseness - vocal cords mobile but with incomplete closure and Continue with speech and swallow therapy. Follow up with ENT/Laryngology, Dr. Graham Bergeron [phone] 2 weeks after discharge. Patient stable to return to [facility]  on 2/11/22.    Rehab course significant for adjustment to diabetes regimen.     All other medical co-morbidites were stable.    Pt tolerated course of inpatient pt/ot/slp rehab with significant functional improvements and met rehab goals prior to discharge.     Pt was medically cleared on 2/24/22 for discharge to Home.        HPI:  This is a 67 YO male with PMH of  HTN, DM2, and known brain mass. He was in the [place] for vacation in early January, when pt was noted to be confused and not answering appropriately, so an MRI was done on Jan 7 showed L frontal enhancing mass with significant edema. He presented to [facility] on 1/24 for further evaluation and repeat CTH showed significant vasogenic edema and 1.5cm MLS. He underwent 1/27/22 for left sided craniotomy for tumor resection with gleolan.  Pt was started on decadron and Vimpat also.  Post op course was complicated by possible seizures with EEG negative. Hyponatermia with salt tabs added to regimen. 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 He was evaluated by PMR and was recommended for acute inpatient rehab. He was admitted to [facility] on 02/08/22, however COVID test came back positive. COVID tests from 02/04 and 02/06/22 were negative. Patient remains asymptomatic.     COVID swab from 2/9/2022 and 2/10/2022 are both negative. Per medical team, he does not need isolation at this time. COVID swab from 2/8/2022 was likely a false positive. He was seen by ENT for hoarseness - vocal cords mobile but with incomplete closure and Continue with speech and swallow therapy. Follow up with ENT/Laryngology, Dr. Graham Bergeron [phone] 2 weeks after discharge. Patient stable to return to [facility]  on 2/11/22.    Rehab course significant for:  - adjustment to diabetes regimen  - ENT consult for throat soreness.  Vocal cord is mobile but with incomplete closure.  Outpatient followup    All other medical co-morbidites were stable.    Pt tolerated course of inpatient pt/ot/slp rehab with significant functional improvements and met rehab goals prior to discharge.     Pt was medically cleared on 2/24/22 for discharge to Home.        HPI:  This is a 67 YO male with PMH of  HTN, DM2, and known brain mass. He was in the [place] for vacation in early January, when pt was noted to be confused and not answering appropriately, so an MRI was done on Jan 7 showed L frontal enhancing mass with significant edema. He presented to [facility] on 1/24 for further evaluation and repeat CTH showed significant vasogenic edema and 1.5cm MLS. He underwent 1/27/22 for left sided craniotomy for tumor resection with gleolan.  Pt was started on decadron and Vimpat also.  Post op course was complicated by possible seizures with EEG negative. Hyponatermia with salt tabs added to regimen. 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 He was evaluated by PMR and was recommended for acute inpatient rehab. He was admitted to [facility] on 02/08/22, however COVID test came back positive. COVID tests from 02/04 and 02/06/22 were negative. Patient remains asymptomatic.     COVID swab from 2/9/2022 and 2/10/2022 are both negative. Per medical team, he does not need isolation at this time. COVID swab from 2/8/2022 was likely a false positive. He was seen by ENT for hoarseness - vocal cords mobile but with incomplete closure and Continue with speech and swallow therapy. Follow up with ENT/Laryngology, Dr. Graham Bergeron [phone] 2 weeks after discharge. Patient stable to return to [facility]  on 2/11/22.    Rehab course significant for:  - adjustment to diabetes regimen  - ENT consult for throat soreness.  Vocal cord is mobile but with incomplete closure.  Outpatient followup  --Will continue salt tabs for SIADH and f/u with PCP and Neurosurgery, with plan of stopping salt tabs in future  --Made functional progress, ambulating functional distance with improved gait and balance    All other medical co-morbidites were stable.    Pt tolerated course of inpatient pt/ot/slp rehab with significant functional improvements and met rehab goals prior to discharge.     Pt was medically cleared on 2/24/22 for discharge to Home.

## 2022-02-23 NOTE — DISCHARGE NOTE PROVIDER - NSDCCPCAREPLAN_GEN_ALL_CORE_FT
PRINCIPAL DISCHARGE DIAGNOSIS  Diagnosis: Brain mass  Assessment and Plan of Treatment: -Continue Vimpat 100mg BID for seizure prophylaxis  -Continue Decadron 2mg Q12h - to maintain until follow up with neurosurgery   -Will require follow up with neuro-oncology      SECONDARY DISCHARGE DIAGNOSES  Diagnosis: Hoarseness  Assessment and Plan of Treatment: - Cepacol PRN  - Seen by ENT - has vocal cords mobile but with incomplete closure  - will follow up with Dr. Graham Bergeron [phone] 2 weeks after discharge.  - Encouraged tylenol use for discomfort.       Diagnosis: Type 2 diabetes mellitus  Assessment and Plan of Treatment: - A1c is 10.7   - Lantus 16 units at bedtime  - Admelog 5 AC  -Continue metformin    Diagnosis: Depression, reactive  Assessment and Plan of Treatment: Continue lexapro 5mg daily    Diagnosis: Hypertension  Assessment and Plan of Treatment: Stop home lisinopril  Monitor blood pressure    Diagnosis: Hyperlipidemia  Assessment and Plan of Treatment: Continue atorvastatin 20mg daily    Diagnosis: Hyponatremia  Assessment and Plan of Treatment: Continue salt tabs 1G every 8 hours  follow up with PCP for further monitoring.     PRINCIPAL DISCHARGE DIAGNOSIS  Diagnosis: Brain mass  Assessment and Plan of Treatment: -Continue Vimpat 100mg BID for seizure prophylaxis  -Continue Decadron 2mg Q12h - to maintain until follow up with neurosurgery   -also follow up with neuro-oncology for outpatient plan of care      SECONDARY DISCHARGE DIAGNOSES  Diagnosis: Hoarseness  Assessment and Plan of Treatment: - Cepacol PRN  - Seen by ENT - has vocal cords mobile but with incomplete closure  - will follow up with Dr. Graham Bergeron [phone] 2 weeks after discharge.  - Encouraged tylenol use for discomfort.  May also use cough drops       Diagnosis: Type 2 diabetes mellitus  Assessment and Plan of Treatment: - A1c is 10.7   - Lantus 16 units at bedtime  - Admelog 5 AC  -Continue metformin 1000 BID  ***if glucose consistantly <90 or >180 please contact endocrinologist.  Otherwise, keep log of glucose monitoring and follow up witn endocrinologist within 2 weeks of discharge    Diagnosis: Depression, reactive  Assessment and Plan of Treatment: Continue lexapro 5mg daily    Diagnosis: Hypertension  Assessment and Plan of Treatment: Stop home lisinopril  Monitor off blood pressure medication  blood pressure has been soft at 100-110 / 65-75.  Keep yourself hydrated.  Please let PCP know if you're symptomatic (lightheaded, dizzy, nauseous)    Diagnosis: Hyperlipidemia  Assessment and Plan of Treatment: Continue atorvastatin 20mg daily    Diagnosis: Hyponatremia  Assessment and Plan of Treatment: Continue salt tabs 1G every 12 hours  Sodium level 139 on 2/24 (normal limits 135-145)  follow up with PCP for further monitoring + sodium tab down titration  Would like to follow up with Dr. Nasim Mitchell for PCP - information provided under follow up     PRINCIPAL DISCHARGE DIAGNOSIS  Diagnosis: Brain mass  Assessment and Plan of Treatment: -Continue Vimpat 100mg BID for seizure prophylaxis  -Continue Decadron 2mg Q12h - to maintain until follow up with neurosurgery   -also follow up with neuro-oncology for outpatient plan of care      SECONDARY DISCHARGE DIAGNOSES  Diagnosis: Hoarseness  Assessment and Plan of Treatment: - Cepacol PRN  - Seen by ENT - has vocal cords mobile but with incomplete closure  - will follow up with Dr. Graham Bergeron [phone] 2 weeks after discharge.  - Encouraged tylenol use for discomfort.  May also use cough drops       Diagnosis: Type 2 diabetes mellitus  Assessment and Plan of Treatment: - A1c is 10.7   - Lantus 16 units at bedtime  - short acting insulin sliding scale coverage for before meals as follow:   ***2 Units for glucose 151-200, 4 Units for glucose 201-250, 6 Units for glucose 251-300, 8 Unitis for glucose 301-350, 10 units for glucose 351-400  -Continue metformin 1000 BID  ***If glucose <70 - please repeat.  If <70 again, drink 4 oz of juice and recheck glucose in 15 min. Repeat juice if glucose <70 after 15 min.    ***Random glucose check if you're feeling unusual: sweaty, headaache/lightheadedness, jittery, nauseous/vomiting, severely tired  ****if glucose consistantly <80 or >180 please contact endocrinologist/PCP (whoever is managing your diabetes).  Otherwise, keep log of glucose monitoring and follow up witn endocrinologist within 2 weeks of discharge    Diagnosis: Depression, reactive  Assessment and Plan of Treatment: Continue lexapro 5mg daily    Diagnosis: Hypertension  Assessment and Plan of Treatment: Stop home lisinopril  Monitor off blood pressure medication  blood pressure has been soft at 100-110 / 65-75.  Keep yourself hydrated.  Please let PCP know if you're symptomatic (lightheaded, dizzy, nauseous)    Diagnosis: Hyperlipidemia  Assessment and Plan of Treatment: Continue atorvastatin 20mg daily    Diagnosis: Hyponatremia  Assessment and Plan of Treatment: Continue salt tabs 1G every 12 hours  Sodium level 139 on 2/24 (normal limits 135-145)  follow up with PCP for further monitoring + sodium tab down titration  Would like to follow up with Dr. Nasim Mitchell for PCP - information provided under follow up     PRINCIPAL DISCHARGE DIAGNOSIS  Diagnosis: Brain mass  Assessment and Plan of Treatment: -Continue Vimpat 100mg BID for seizure prophylaxis - Please provide chain pharmacy with ********[employer] Medicare information - ID# [id number], BIN #, PCN# and Group # - so it'll be covered  -Continue Decadron 2mg Q12h - to maintain until follow up with neurosurgery   -also follow up with neuro-oncology for outpatient plan of care      SECONDARY DISCHARGE DIAGNOSES  Diagnosis: Hoarseness  Assessment and Plan of Treatment: - Cepacol PRN  - Seen by ENT - has vocal cords mobile but with incomplete closure  - will follow up with Dr. Graham Bergeron [phone] 2 weeks after discharge.  - Encouraged tylenol use for discomfort.  May also use cough drops       Diagnosis: Type 2 diabetes mellitus  Assessment and Plan of Treatment: - A1c is 10.7   - Lantus (long acting) 16 units at bedtime  - Lispro (short acting) 5 Units before breakfast, lunch, and dinner.  Hold if glucose reading is <100.  -Continue metformin 1000 BID  ***If glucose <70 - please repeat.  If <70 again, drink 4 oz of juice and recheck glucose in 15 min. Repeat juice if glucose <70 after 15 min.    ***Random glucose check if you're feeling unusual: sweaty, headaache/lightheadedness, jittery, nauseous/vomiting, severely tired  ****if glucose consistantly <80 or >180 please contact endocrinologist/PCP (whoever is managing your diabetes).  Otherwise, keep log of glucose monitoring and follow up witn endocrinologist within 2 weeks of discharge    Diagnosis: Depression, reactive  Assessment and Plan of Treatment: Continue lexapro 5mg daily    Diagnosis: Hypertension  Assessment and Plan of Treatment: Stop home lisinopril  Monitor off blood pressure medication  blood pressure has been soft at 100-110 / 65-75.  Keep yourself hydrated.  Please let PCP know if you're symptomatic (lightheaded, dizzy, nauseous)    Diagnosis: Hyperlipidemia  Assessment and Plan of Treatment: Continue atorvastatin 20mg daily    Diagnosis: Hyponatremia  Assessment and Plan of Treatment: Continue salt tabs 1G every 12 hours  Sodium level 139 on 2/24 (normal limits 135-145)  follow up with PCP for further monitoring + sodium tab down titration  Would like to follow up with Dr. Nasim Mitchell for PCP - information provided under follow up

## 2022-02-23 NOTE — DISCHARGE NOTE PROVIDER - CARE PROVIDERS DIRECT ADDRESSES
[email or web address]

## 2022-02-23 NOTE — DISCHARGE NOTE PROVIDER - DETAILS OF MALNUTRITION DIAGNOSIS/DIAGNOSES
This patient has been assessed with a concern for Malnutrition and was treated during this hospitalization for the following Nutrition diagnosis/diagnoses:     -  02/12/2022: Severe protein-calorie malnutrition

## 2022-02-23 NOTE — PROGRESS NOTE ADULT - ASSESSMENT
ASSESSMENT/PLAN  66 year old male with PMH of  HTN, DM2, and known brain mass. He was in the [place] for vacation in early January, when he was noted to be confused and not answering appropriately, so an MRI was done on Jan 7 showed L frontal enhancing mass with significant edema. He presented to [facility] on 1/24 for further evaluation and repeat CTH which  showed significant vasogenic edema and 1.5cm MLS. He underwent 1/27/22 for left sided craniotomy for tumor resection with gleolan. Post-op course was complicated by possible seizures with EEG negative, hyponatermia with salt tabs added to regimen. 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 He was admitted to [facility] but tested positive for covid. He was asymptomatic but trasnferred to medical floor for quarantine. His subsequent covid swabs came back negative. Patient was deemed stable to return to [facility]  on 2/11/22. Patient  with gait Instability, ADL impairments and Functional impairments.      #Left frontal tumor s/p craniotomy for resection with right sided drift, Gait Instability, ADL impairments and Functional impairments  - Continue Comprehensive Rehab Program of PT/OT/SLP  - Monitor incision -staples removed 2/14 - tolerated well  -Continue Vimpat 100mg BID for seizure prophylaxis  -Continue Decadron slow taper- now on 2mg Q12h - to maintain until follow up with neurosurgery   -Will require follow up with neuro-oncology     #Cough/hoarseness   - has Cepacol PRN  - Seen by ENT - has vocal cords mobile but with incomplete closure  - will follow up with Dr. Graham Bergeron [phone] 2 weeks after discharge.  - Encouraged tylenol use for discomfort.      #Depression  - Continue Lexapro  - Neuropsychology  input     #Hyponatremia  -Continue salt tabs 1G Q8h  - monitor NA  - Na 137 (2/21)    #HTN  -Home med: lisinopril 5mg daily  -BP controlled     #HLD   -Continue atorvastatin 20mg daily    #Type 2 DM  - A1c is 10.7 (1/25)  -FS and ISS  - Lantus 16 units at bedtime  - Admelog 5 AC  - metformin 1000 BID (2/21)    #Pain control  - Tylenol PRN    #Covid 19 - false positive   - positive on 2/8- likely false positive  - Negative  2/9 and 2/10  - Continues to be asymptomatic    #GI/Bowel Mgmt   - Senna at bedtime   - Miralax BID    #DVT  - Lovenox  - SCDs  - TEDs     #Skin:  -Incisional wound care per nursing. Surgical staples removed, wound intact    FEN   - Diet -regular with thin liquids [CCHO, DASH/TLC]    -Passed MBS 2/16    - Dysphagia  SLP - evaluation and treatment    Outpatient Follow-up (Specialty/Name of physician):  Parker Gupta)  Neurosurgery  [address] [place] [address]  Phone: [phone]  Fax: [phone]  Follow Up Time:     Matthew Hill)  Neurology  [facility], [address] [place] [address]  Phone: [phone]  Fax: [phone]  Follow Up Time:     Kristopher Guevara  RADIATION ONCOLOGY  [address] [place] [address] RD Entrance A [place] [address]  Phone: [phone]  Fax: [phone]    TEAM MEETING 2/24/22  SW:  moving in with daughter, family training needed  OT: Mod I for adls, SV for transfers and bathing -met goals  PT: SV for transfers, 100' amb no device with SV, 12 stairs with SV  SLP: MBS passed for regular with thins, delayed emptying may need GI as outpatient  barriers: cognition, comprehension, needs 24/7 supervision, impulsive   goals: Mod I for adls and transfers, SV for ambulation - met goals   EDOD: Home with 24/7 2/24     ASSESSMENT/PLAN  66 year old male with PMH of  HTN, DM2, and known brain mass. He was in the [place] for vacation in early January, when he was noted to be confused and not answering appropriately, so an MRI was done on Jan 7 showed L frontal enhancing mass with significant edema. He presented to [facility] on 1/24 for further evaluation and repeat CTH which  showed significant vasogenic edema and 1.5cm MLS. He underwent 1/27/22 for left sided craniotomy for tumor resection with gleolan. Post-op course was complicated by possible seizures with EEG negative, hyponatermia with salt tabs added to regimen. 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 He was admitted to [facility] but tested positive for covid. He was asymptomatic but trasnferred to medical floor for quarantine. His subsequent covid swabs came back negative. Patient was deemed stable to return to [facility]  on 2/11/22. Patient  with gait Instability, ADL impairments and Functional impairments.    * Throat discomfort due to incomplete vocal cord closure f/u with ENT post d/c  * Insomnia, will give melatonin if persistent    #Left frontal tumor s/p craniotomy for resection with right sided drift, Gait Instability, ADL impairments and Functional impairments  - Continue Comprehensive Rehab Program of PT/OT/SLP  - Monitor incision -staples removed 2/14 - tolerated well  -Continue Vimpat 100mg BID for seizure prophylaxis  -Continue Decadron slow taper- now on 2mg Q12h - to maintain until follow up with neurosurgery   -Will require follow up with neuro-oncology     #Cough/hoarseness   - has Cepacol PRN  - Seen by ENT - has vocal cords mobile but with incomplete closure  - will follow up with Dr. Graham Bergeron [phone] 2 weeks after discharge.  - Encouraged tylenol use for discomfort.      #Depression  - Continue Lexapro  - Neuropsychology  input     #Hyponatremia  -Continue salt tabs 1G Q8h  - monitor NA  - Na 137 (2/21)    #HTN  -Home med: lisinopril 5mg daily  -BP controlled     #HLD   -Continue atorvastatin 20mg daily    #Type 2 DM  - A1c is 10.7 (1/25)  -FS and ISS  - Lantus 16 units at bedtime  - Admelog 5 AC  - metformin 1000 BID (2/21)    #Pain control  - Tylenol PRN    #Covid 19 - false positive   - positive on 2/8- likely false positive  - Negative  2/9 and 2/10  - Continues to be asymptomatic    #GI/Bowel Mgmt   - Senna at bedtime   - Miralax BID    #DVT  - Lovenox  - SCDs  - TEDs     #Skin:  -Incisional wound care per nursing. Surgical staples removed, wound intact    FEN   - Diet -regular with thin liquids [CCHO, DASH/TLC]    -Passed MBS 2/16    - Dysphagia  SLP - evaluation and treatment    Outpatient Follow-up (Specialty/Name of physician):  Parker Gupta)  Neurosurgery  [address] [place] [address]  Phone: [phone]  Fax: [phone]  Follow Up Time:     Matthew Hill)  Neurology  [facility], [address] [place] [address]  Phone: [phone]  Fax: [phone]  Follow Up Time:     Kristopher Guevara  RADIATION ONCOLOGY  [address] [place] [address] A - [place] [address]  Phone: [phone]  Fax: [phone]    TEAM MEETING 2/24/22  SW:  moving in with daughter, family training needed  OT: Mod I for adls, SV for transfers and bathing -met goals  PT: SV for transfers, 100' amb no device with SV, 12 stairs with SV  SLP: MBS passed for regular with thins, delayed emptying may need GI as outpatient  barriers: cognition, comprehension, needs 24/7 supervision, impulsive   goals: Mod I for adls and transfers, SV for ambulation - met goals   EDOD: Home with 24/7 2/24

## 2022-02-23 NOTE — CHART NOTE - NSCHARTNOTEFT_GEN_A_CORE
Nutrition Follow Up Note    Source: Medical Record [X] Patient [X] Family [ ]         Diet: Regular, consistent carbohydrate (no snacks), Glucerna daily  Pt tolerating diet with good PO intake, consuming % of meals per nursing flow sheets. Pt reports fair appetite presently due to sore throat, requests softer foods. Will honor request. Pt provided with review of nutrition education on consistent carb diet. Denies digestive issues currently.    Enteral/Parenteral Nutrition: N/A    Current Weight: 166.8 lbs (2/22)  166.4 lbs (2/20)  167.9 lbs (2/18)  166 lbs (2/16)    Pertinent Medications: MEDICATIONS  (STANDING):  atorvastatin 20 milliGRAM(s) Oral at bedtime  dexAMETHasone     Tablet 2 milliGRAM(s) Oral two times a day  dextrose 40% Gel 15 Gram(s) Oral once  dextrose 5%. 1000 milliLiter(s) (50 mL/Hr) IV Continuous <Continuous>  dextrose 5%. 1000 milliLiter(s) (100 mL/Hr) IV Continuous <Continuous>  dextrose 50% Injectable 25 Gram(s) IV Push once  dextrose 50% Injectable 25 Gram(s) IV Push once  dextrose 50% Injectable 12.5 Gram(s) IV Push once  enoxaparin Injectable 40 milliGRAM(s) SubCutaneous daily  escitalopram 5 milliGRAM(s) Oral daily  fluticasone propionate 50 MICROgram(s)/spray Nasal Spray 1 Spray(s) Both Nostrils two times a day  gabapentin 100 milliGRAM(s) Oral at bedtime  glucagon  Injectable 1 milliGRAM(s) IntraMuscular once  insulin glargine Injectable (LANTUS) 16 Unit(s) SubCutaneous at bedtime  insulin lispro (ADMELOG) corrective regimen sliding scale   SubCutaneous three times a day before meals  insulin lispro Injectable (ADMELOG) 5 Unit(s) SubCutaneous three times a day before meals  lacosamide Solution 100 milliGRAM(s) Oral two times a day  metFORMIN 1000 milliGRAM(s) Oral two times a day  pantoprazole    Tablet 40 milliGRAM(s) Oral before breakfast  polyethylene glycol 3350 17 Gram(s) Oral every 12 hours  senna 2 Tablet(s) Oral at bedtime  sodium chloride 1 Gram(s) Oral every 8 hours    MEDICATIONS  (PRN):  acetaminophen     Tablet .. 650 milliGRAM(s) Oral every 6 hours PRN Temp greater or equal to 38C (100.4F), Mild Pain (1 - 3)  benzocaine 15 mG/menthol 3.6 mG Lozenge 1 Lozenge Oral every 4 hours PRN Sore Throat      Pertinent Labs:  02-21 Na137 mmol/L Glu 196 mg/dL<H> K+ 4.1 mmol/L Cr  0.99 mg/dL BUN 19 mg/dL 02-21 Alb 2.5 g/dL<L>  POCT glucose x 4: 215 (H), 159 (H), 135 (H), 78 (WNL)      Skin: surgical incision per nursing flow sheets     Edema: No edema per nursing flow sheets     Last BM: on 2/22 Per nursing flowsheets      Estimated Needs:   [X] No Change since Previous Assessment  [ ] Recalculated:     Previous Nutrition Diagnosis:   1. Severe malnutrition  2. Food and nutrition knowledge deficit    Nutrition Diagnosis is [X] Ongoing  - addressed with diet, Glucerna, obtained food preferences, diet education ongoing    New Nutrition Diagnosis: [X] Not Applicable  [ ] Inadequate Protein Energy Intake   [ ] Inadequate Oral Intake   [ ] Excessive Energy Intake   [ ] Increased Nutrient Needs   [ ] Obesity   [ ] Altered GI Function   [ ] Unintended Weight Loss   [ ] Food & Nutrition Related Knowledge Deficit  [ ] Limited Adherence to nutrition related recommendations   [ ] Malnutrition      Interventions:   1. Recommend continuing with current plan of care    Monitoring & Evaluation:   [X] Weights   [X] PO Intake   [X] Follow Up (Per Protocol)  [X] Tolerance to Diet Prescription   [X] Other: Labs    RD Remains Available.  Mary Alice Dudley RD

## 2022-02-23 NOTE — PROGRESS NOTE ADULT - SUBJECTIVE AND OBJECTIVE BOX
HISTORY OF PRESENT ILLNESS  This is a 65 YO male with PMH of  HTN, DM2, and known brain mass. He was in the [place] for vacation in early January, when pt was noted to be confused and not answering appropriately, so an MRI was done on Jan 7 showed L frontal enhancing mass with significant edema. He presented to [facility] on 1/24 for further evaluation and repeat CTH showed significant vasogenic edema and 1.5cm MLS. He underwent 1/27/22 for left sided craniotomy for tumor resection with gleolan.  Pt was started on decadron and Vimpat also.  Post op course was complicated by possible seizures with EEG negative. Hyponatermia with salt tabs added to regimen. 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 He was evaluated by PMR and was recommended for acute inpatient rehab. He was admitted to [facility] on 02/08/22, however COVID test came back positive. COVID tests from 02/04 and 02/06/22 were negative. Patient remains asymptomatic.     COVID swab from 2/9/2022 and 2/10/2022 are both negative. Per medical team, he does not need isolation at this time. COVID swab from 2/8/2022 was likely a false positive. He was seen by ENT for hoarseness - vocal cords mobile but with incomplete closure and Continue with speech and swallow therapy. Follow up with ENT/Laryngology, Dr. Graham Bergeron [phone] 2 weeks after discharge. Patient stable to return to [facility]- IRU  on 2/11/22.    CHIEF COMPLAINT:    Continues to complain of sore throat which is minimally relieved with tylenol. Encouraged to continue tylenol use. He also complained of not being able to sleep well    Denies CP, SOB, nausea/vomiting, abd pain, or dysuria/frequency.     REVIEW OF SYSTEMS  [X] Constitutional WNL          [X] Cardio WNL            [X] Resp WNL           [X] GI WNL; LBM 2/20            [X]  WNL                   [X] Heme WNL              [X] Endo WNL                       [X] Skin WNL                 [X] MSK WNL            [X] Neuro - Intermittent HA    [X] Cognitive WNL        [X] Psych WNL    Labs -unremarkable    Therapy--engaging, ambulating functional distance with no gait aid. working on endurance and stairs negotiating      VITALS  Vital Signs Last 24 Hrs  T(C): 36.8 (22 Feb 2022 07:25), Max: 36.9 (21 Feb 2022 19:47)  T(F): 98.2 (22 Feb 2022 07:25), Max: 98.5 (21 Feb 2022 19:47)  HR: 73 (22 Feb 2022 07:25) (73 - 80)  BP: 110/62 (22 Feb 2022 07:25) (110/62 - 116/74)  RR: 16 (22 Feb 2022 07:25) (16 - 16)  SpO2: 94% (22 Feb 2022 07:25) (94% - 95%)    PHYSICAL EXAM  Constitutional - NAD, Comfortable  HEENT - NCAT, EOMI  Neck - Supple, No limited ROM  Chest - Clear  Cardiovascular - RRR, S1S2  Abdomen -  Soft, NTND  Extremities - No C/C/E, No calf tenderness   Scalp surgical wound healing well, sutures already removed, edges together    Neurologic Exam -  no new  focal deficits                      RECENT LABS   LAB                        11.8   6.58  )-----------( 145      ( 21 Feb 2022 06:41 )             36.9     02-21    137  |  99  |  19  ----------------------------<  196<H>  4.1   |  30  |  0.99    Ca    9.0      21 Feb 2022 06:41    TPro  6.8  /  Alb  2.5<L>  /  TBili  0.5  /  DBili  x   /  AST  9<L>  /  ALT  18  /  AlkPhos  70  02-21    LIVER FUNCTIONS - ( 21 Feb 2022 06:41 )  Alb: 2.5 g/dL / Pro: 6.8 g/dL / ALK PHOS: 70 U/L / ALT: 18 U/L / AST: 9 U/L / GGT: x             CAPILLARY BLOOD GLUCOSE      POCT Blood Glucose.: 215 mg/dL (23 Feb 2022 11:25)  POCT Blood Glucose.: 159 mg/dL (23 Feb 2022 07:34)  POCT Blood Glucose.: 135 mg/dL (22 Feb 2022 21:39)  POCT Blood Glucose.: 78 mg/dL (22 Feb 2022 16:21)      MEDICATIONS  (STANDING):  atorvastatin 20 milliGRAM(s) Oral at bedtime  dexAMETHasone     Tablet 2 milliGRAM(s) Oral two times a day  dextrose 40% Gel 15 Gram(s) Oral once  dextrose 5%. 1000 milliLiter(s) (50 mL/Hr) IV Continuous <Continuous>  dextrose 5%. 1000 milliLiter(s) (100 mL/Hr) IV Continuous <Continuous>  dextrose 50% Injectable 25 Gram(s) IV Push once  dextrose 50% Injectable 12.5 Gram(s) IV Push once  dextrose 50% Injectable 25 Gram(s) IV Push once  enoxaparin Injectable 40 milliGRAM(s) SubCutaneous daily  escitalopram 5 milliGRAM(s) Oral daily  fluticasone propionate 50 MICROgram(s)/spray Nasal Spray 1 Spray(s) Both Nostrils two times a day  gabapentin 100 milliGRAM(s) Oral at bedtime  glucagon  Injectable 1 milliGRAM(s) IntraMuscular once  insulin glargine Injectable (LANTUS) 16 Unit(s) SubCutaneous at bedtime  insulin lispro (ADMELOG) corrective regimen sliding scale   SubCutaneous three times a day before meals  insulin lispro Injectable (ADMELOG) 5 Unit(s) SubCutaneous three times a day before meals  lacosamide Solution 100 milliGRAM(s) Oral two times a day  metFORMIN 1000 milliGRAM(s) Oral two times a day  pantoprazole    Tablet 40 milliGRAM(s) Oral before breakfast  polyethylene glycol 3350 17 Gram(s) Oral every 12 hours  senna 2 Tablet(s) Oral at bedtime  sodium chloride 1 Gram(s) Oral every 8 hours    MEDICATIONS  (PRN):  acetaminophen     Tablet .. 650 milliGRAM(s) Oral every 6 hours PRN Temp greater or equal to 38C (100.4F), Mild Pain (1 - 3)  benzocaine 15 mG/menthol 3.6 mG Lozenge 1 Lozenge Oral every 4 hours PRN Sore Throat     HISTORY OF PRESENT ILLNESS  This is a 65 YO male with PMH of  HTN, DM2, and known brain mass. He was in the [place] for vacation in early January, when pt was noted to be confused and not answering appropriately, so an MRI was done on Jan 7 showed L frontal enhancing mass with significant edema. He presented to [facility] on 1/24 for further evaluation and repeat CTH showed significant vasogenic edema and 1.5cm MLS. He underwent 1/27/22 for left sided craniotomy for tumor resection with gleolan.  Pt was started on decadron and Vimpat also.  Post op course was complicated by possible seizures with EEG negative. Hyponatermia with salt tabs added to regimen. 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 He was evaluated by PMR and was recommended for acute inpatient rehab. He was admitted to [facility] on 02/08/22, however COVID test came back positive. COVID tests from 02/04 and 02/06/22 were negative. Patient remains asymptomatic.     COVID swab from 2/9/2022 and 2/10/2022 are both negative. Per medical team, he does not need isolation at this time. COVID swab from 2/8/2022 was likely a false positive. He was seen by ENT for hoarseness - vocal cords mobile but with incomplete closure and Continue with speech and swallow therapy. Follow up with ENT/Laryngology, Dr. Graham Bergeron [phone] 2 weeks after discharge. Patient stable to return to [facility]- IRU  on 2/11/22.      CHIEF COMPLAINT:    Seen and examined  Continues to complain of throat discomfort, reports some relief with tylenol.  ENT attributes this to incomplete vocal cord closure and has plans for out patient's f/u as explained to patient  He reports insomnia due to noise distraction, able to sleep during the day  Tolerating lose dose steroid as recs by Neurosurgery till his f/u with them    Denies CP, SOB, nausea/vomiting, abd pain, or dysuria/frequency.     REVIEW OF SYSTEMS  [X] Constitutional WNL          [X] Cardio WNL            [X] Resp WNL           [X] GI WNL; LBM 2/20            [X]  WNL                   [X] Heme WNL              [X] Endo WNL                       [X] Skin WNL                 [X] MSK WNL            [X] Neuro - Intermittent HA    [X] Cognitive WNL        [X] Psych WNL    Therapy--engaging, and making progress with progressive ambulation and stairs negotiation    VITALS  Vital Signs Last 24 Hrs  T(C): 36.8 (22 Feb 2022 07:25), Max: 36.9 (21 Feb 2022 19:47)  T(F): 98.2 (22 Feb 2022 07:25), Max: 98.5 (21 Feb 2022 19:47)  HR: 73 (22 Feb 2022 07:25) (73 - 80)  BP: 110/62 (22 Feb 2022 07:25) (110/62 - 116/74)  RR: 16 (22 Feb 2022 07:25) (16 - 16)  SpO2: 94% (22 Feb 2022 07:25) (94% - 95%)    PHYSICAL EXAM  Constitutional - NAD, Comfortable  HEENT - NCAT, EOMI, no external injury noted  Neck - Supple, No limited ROM  Chest - Clear  Cardiovascular - RRR, S1S2  Abdomen -  Soft, non tender  Extremities - No C/C/E, No calf tenderness   Scalp surgical wound healing well, sutures already removed, edges together    Neurologic Exam -  no new  focal deficits                      RECENT LABS   LAB                        11.8   6.58  )-----------( 145      ( 21 Feb 2022 06:41 )             36.9     02-21    137  |  99  |  19  ----------------------------<  196<H>  4.1   |  30  |  0.99    Ca    9.0      21 Feb 2022 06:41    TPro  6.8  /  Alb  2.5<L>  /  TBili  0.5  /  DBili  x   /  AST  9<L>  /  ALT  18  /  AlkPhos  70  02-21    LIVER FUNCTIONS - ( 21 Feb 2022 06:41 )  Alb: 2.5 g/dL / Pro: 6.8 g/dL / ALK PHOS: 70 U/L / ALT: 18 U/L / AST: 9 U/L / GGT: x             CAPILLARY BLOOD GLUCOSE      POCT Blood Glucose.: 215 mg/dL (23 Feb 2022 11:25)  POCT Blood Glucose.: 159 mg/dL (23 Feb 2022 07:34)  POCT Blood Glucose.: 135 mg/dL (22 Feb 2022 21:39)  POCT Blood Glucose.: 78 mg/dL (22 Feb 2022 16:21)      MEDICATIONS  (STANDING):  atorvastatin 20 milliGRAM(s) Oral at bedtime  dexAMETHasone     Tablet 2 milliGRAM(s) Oral two times a day  dextrose 40% Gel 15 Gram(s) Oral once  dextrose 5%. 1000 milliLiter(s) (50 mL/Hr) IV Continuous <Continuous>  dextrose 5%. 1000 milliLiter(s) (100 mL/Hr) IV Continuous <Continuous>  dextrose 50% Injectable 25 Gram(s) IV Push once  dextrose 50% Injectable 12.5 Gram(s) IV Push once  dextrose 50% Injectable 25 Gram(s) IV Push once  enoxaparin Injectable 40 milliGRAM(s) SubCutaneous daily  escitalopram 5 milliGRAM(s) Oral daily  fluticasone propionate 50 MICROgram(s)/spray Nasal Spray 1 Spray(s) Both Nostrils two times a day  gabapentin 100 milliGRAM(s) Oral at bedtime  glucagon  Injectable 1 milliGRAM(s) IntraMuscular once  insulin glargine Injectable (LANTUS) 16 Unit(s) SubCutaneous at bedtime  insulin lispro (ADMELOG) corrective regimen sliding scale   SubCutaneous three times a day before meals  insulin lispro Injectable (ADMELOG) 5 Unit(s) SubCutaneous three times a day before meals  lacosamide Solution 100 milliGRAM(s) Oral two times a day  metFORMIN 1000 milliGRAM(s) Oral two times a day  pantoprazole    Tablet 40 milliGRAM(s) Oral before breakfast  polyethylene glycol 3350 17 Gram(s) Oral every 12 hours  senna 2 Tablet(s) Oral at bedtime  sodium chloride 1 Gram(s) Oral every 8 hours    MEDICATIONS  (PRN):  acetaminophen     Tablet .. 650 milliGRAM(s) Oral every 6 hours PRN Temp greater or equal to 38C (100.4F), Mild Pain (1 - 3)  benzocaine 15 mG/menthol 3.6 mG Lozenge 1 Lozenge Oral every 4 hours PRN Sore Throat

## 2022-02-23 NOTE — PROGRESS NOTE ADULT - SUBJECTIVE AND OBJECTIVE BOX
Patient is a 66y old  Male who presents with a chief complaint of Glioblastoma (22 Feb 2022 10:45)      SUBJECTIVE / OVERNIGHT EVENTS:  Pt seen and examined at bedside. No acute events overnight.    Allergies    No Known Allergies    Intolerances        MEDICATIONS  (STANDING):  atorvastatin 20 milliGRAM(s) Oral at bedtime  dexAMETHasone     Tablet 2 milliGRAM(s) Oral two times a day  dextrose 40% Gel 15 Gram(s) Oral once  dextrose 5%. 1000 milliLiter(s) (50 mL/Hr) IV Continuous <Continuous>  dextrose 5%. 1000 milliLiter(s) (100 mL/Hr) IV Continuous <Continuous>  dextrose 50% Injectable 25 Gram(s) IV Push once  dextrose 50% Injectable 25 Gram(s) IV Push once  dextrose 50% Injectable 12.5 Gram(s) IV Push once  enoxaparin Injectable 40 milliGRAM(s) SubCutaneous daily  escitalopram 5 milliGRAM(s) Oral daily  fluticasone propionate 50 MICROgram(s)/spray Nasal Spray 1 Spray(s) Both Nostrils two times a day  gabapentin 100 milliGRAM(s) Oral at bedtime  glucagon  Injectable 1 milliGRAM(s) IntraMuscular once  insulin glargine Injectable (LANTUS) 16 Unit(s) SubCutaneous at bedtime  insulin lispro (ADMELOG) corrective regimen sliding scale   SubCutaneous three times a day before meals  insulin lispro Injectable (ADMELOG) 5 Unit(s) SubCutaneous three times a day before meals  lacosamide Solution 100 milliGRAM(s) Oral two times a day  metFORMIN 1000 milliGRAM(s) Oral two times a day  pantoprazole    Tablet 40 milliGRAM(s) Oral before breakfast  polyethylene glycol 3350 17 Gram(s) Oral every 12 hours  senna 2 Tablet(s) Oral at bedtime  sodium chloride 1 Gram(s) Oral every 8 hours    MEDICATIONS  (PRN):  acetaminophen     Tablet .. 650 milliGRAM(s) Oral every 6 hours PRN Temp greater or equal to 38C (100.4F), Mild Pain (1 - 3)  benzocaine 15 mG/menthol 3.6 mG Lozenge 1 Lozenge Oral every 4 hours PRN Sore Throat      Vital Signs Last 24 Hrs  T(C): 36.7 (22 Feb 2022 19:53), Max: 36.7 (22 Feb 2022 19:53)  T(F): 98 (22 Feb 2022 19:53), Max: 98 (22 Feb 2022 19:53)  HR: 81 (22 Feb 2022 19:53) (81 - 81)  BP: 106/70 (22 Feb 2022 19:53) (106/70 - 106/70)  BP(mean): --  RR: 16 (22 Feb 2022 19:53) (16 - 16)  SpO2: 96% (22 Feb 2022 19:53) (96% - 96%)  CAPILLARY BLOOD GLUCOSE      POCT Blood Glucose.: 159 mg/dL (23 Feb 2022 07:34)  POCT Blood Glucose.: 135 mg/dL (22 Feb 2022 21:39)  POCT Blood Glucose.: 78 mg/dL (22 Feb 2022 16:21)  POCT Blood Glucose.: 270 mg/dL (22 Feb 2022 11:26)    I&O's Summary      PHYSICAL EXAM:  GENERAL: NAD, well-developed  HEAD:  Surgical incision site c/d/i s/p staple removal  NECK: Supple, No JVD  CHEST/LUNG: Clear to auscultation bilaterally; No wheeze, nonlabored breathing  HEART: Regular rate and rhythm; No murmurs, rubs, or gallops  ABDOMEN: Soft, Nontender, Nondistended; Bowel sounds present  EXTREMITIES:  2+ Peripheral Pulses, No clubbing, cyanosis, or edema  NEUROLOGY: AAOx3, non-focal  PSYCH: calm, appropriate mood    LABS:                    RADIOLOGY & ADDITIONAL TESTS:  Results Reviewed:   Imaging Personally Reviewed:  Electrocardiogram Personally Reviewed:    COORDINATION OF CARE:  Care Discussed with Consultants/Other Providers [Y/N]:  Prior or Outpatient Records Reviewed [Y/N]:

## 2022-02-23 NOTE — PROGRESS NOTE ADULT - ASSESSMENT
67 y/o man with PMH of  HTN, DM2, and recent discovery of brain Glioblastoma s/p 1/27/22 for left sided craniotomy. Post-op course was complicated by possible hyponatermia with salt tabs added to regimen. 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 He was admitted to [facility] for functional and gair impairment     Functional and gait instability:  - C/w PT/OT per primary team     Left frontal GBM s/p craniotomy for resection with right sided drift  -Continue Vimpat 100mg BID for seizure prophylaxis  -Continue Decadron slow taper- now on 2mg BID - to maintain until follow up with neurosurgery   -Will require follow up with neuro-oncology     Hyponatremia  -Resolved  -Continue fluid restriction  -Continue salt tabs 1G Q8h. Will continue to taper and monitor Na levels    HTN  -lisinopril has been held due to soft BP     HLD   -Continue atorvastatin 20mg daily    Type 2 DM uncontrolled  -A1c is 10.7  -Continue Metformin 1000mg BID. Monitor for GI Side effect. If develops, decrease down to 500mg BID  -Continue Lantus 16 units QHS + premeal 5units  -FS and ISS    DVT PPX  - Lovenox  - SCDs  - TEDs

## 2022-02-23 NOTE — DISCHARGE NOTE PROVIDER - PROVIDER TOKENS
PROVIDER:[TOKEN:[[id number]],FOLLOWUP:[2 weeks]],PROVIDER:[TOKEN:[[id number]],FOLLOWUP:[1 week]],PROVIDER:[TOKEN:[[id number]],FOLLOWUP:[1 week]] PROVIDER:[TOKEN:[[id number]],FOLLOWUP:[2 weeks]],PROVIDER:[TOKEN:[[id number]],FOLLOWUP:[1 week]],PROVIDER:[TOKEN:[[id number]],FOLLOWUP:[1 week]],PROVIDER:[TOKEN:[[id number]],FOLLOWUP:[1 week]]

## 2022-02-23 NOTE — DISCHARGE NOTE PROVIDER - NSFOLLOWUPCLINICS_GEN_ALL_ED_FT
[facility]  Endocrinology  [address] [place] [address]  Phone: [phone]  Fax: [phone]

## 2022-02-23 NOTE — DISCHARGE NOTE PROVIDER - NSDCMRMEDTOKEN_GEN_ALL_CORE_FT
acetaminophen 325 mg oral tablet: 2 tab(s) orally every 6 hours, As needed, Temp greater or equal to 38C (100.4F), Mild Pain (1 - 3)  atorvastatin 20 mg oral tablet: 1 tab(s) orally once a day (at bedtime)  dexamethasone 2 mg oral tablet: 1 tab(s) orally 2 times a day  escitalopram 5 mg oral tablet: 1 tab(s) orally once a day  fluticasone 50 mcg/inh nasal spray: 1 spray(s) nasal 2 times a day  gabapentin 100 mg oral capsule: 1 cap(s) orally once a day (at bedtime)  HumaLOG KwikPen 100 units/mL injectable solution: 5 unit(s) subcutaneous 3 times a day (with meals)   lacosamide 100 mg oral tablet: 1 tab(s) orally 2 times a day MDD:2  Lantus Solostar Pen 100 units/mL subcutaneous solution: 16 unit(s) subcutaneous once a day (at bedtime)   melatonin 3 mg oral tablet: 1 tab(s) orally once a day (at bedtime)  menthol-benzocaine 3.6 mg-15 mg mucous membrane lozenge: 1 lozenge mucous membrane 3 times a day, As Needed  metFORMIN 1000 mg oral tablet: 1 tab(s) orally 2 times a day  pantoprazole 40 mg oral delayed release tablet: 1 tab(s) orally once a day (before a meal)  polyethylene glycol 3350 oral powder for reconstitution: 17 gram(s) orally every 12 hours  senna oral tablet: 2 tab(s) orally once a day (at bedtime)  sodium chloride 1 g oral tablet: 1 tab(s) orally every 8 hours    acetaminophen 325 mg oral tablet: 2 tab(s) orally every 6 hours, As needed, Temp greater or equal to 38C (100.4F), Mild Pain (1 - 3)  atorvastatin 20 mg oral tablet: 1 tab(s) orally once a day (at bedtime)  dexamethasone 2 mg oral tablet: 1 tab(s) orally 2 times a day  escitalopram 5 mg oral tablet: 1 tab(s) orally once a day  fluticasone 50 mcg/inh nasal spray: 1 spray(s) nasal 2 times a day  gabapentin 100 mg oral capsule: 1 cap(s) orally once a day (at bedtime)  HumaLOG KwikPen 100 units/mL injectable solution: 5 unit(s) subcutaneous 3 times a day (with meals)   lacosamide 100 mg oral tablet: 1 tab(s) orally 2 times a day MDD:2  Lantus Solostar Pen 100 units/mL subcutaneous solution: 16 unit(s) subcutaneous once a day (at bedtime)   melatonin 3 mg oral tablet: 1 tab(s) orally once a day (at bedtime)  menthol-benzocaine 3.6 mg-15 mg mucous membrane lozenge: 1 lozenge mucous membrane 3 times a day, As Needed  metFORMIN 1000 mg oral tablet: 1 tab(s) orally 2 times a day  pantoprazole 40 mg oral delayed release tablet: 1 tab(s) orally once a day (before a meal)  polyethylene glycol 3350 oral powder for reconstitution: 17 gram(s) orally every 12 hours, As Needed for constipation (if no BM x 2 days).   senna oral tablet: 2 tab(s) orally once a day (at bedtime)  sodium chloride 1 g oral tablet: 1 tab(s) orally 2 times a day    acetaminophen 325 mg oral tablet: 2 tab(s) orally every 6 hours, As needed, Temp greater or equal to 38C (100.4F), Mild Pain (1 - 3)  alcohol swabs : Apply topically to affected area 4 times a day   atorvastatin 20 mg oral tablet: 1 tab(s) orally once a day (at bedtime)  dexamethasone 2 mg oral tablet: 1 tab(s) orally 2 times a day  escitalopram 5 mg oral tablet: 1 tab(s) orally once a day  fluticasone 50 mcg/inh nasal spray: 1 spray(s) nasal 2 times a day  gabapentin 100 mg oral capsule: 1 cap(s) orally once a day (at bedtime)  glucometer (per patient&#x27;s insurance): Test blood sugars four times a day. Dispense #1 glucometer.  HumaLOG KwikPen 100 units/mL injectable solution: Moderate sliding scale as per glucose monitoring before meals  Please administer:  2 Units for glucose 151-200  4U for 201-250  6U for 251-300  8U for 301-350  10U for 351-400  HumaLOG KwikPen 100 units/mL injectable solution: 5 unit(s) subcutaneous 3 times a day (with meals)   Insulin Pen Needles, 4mm: 1 application subcutaneously 4 times a day. ** Use with insulin pen **   lacosamide 100 mg oral tablet: 1 tab(s) orally 2 times a day MDD:2  lancets: 1 application subcutaneously 4 times a day   Lantus Solostar Pen 100 units/mL subcutaneous solution: 16 unit(s) subcutaneous once a day (at bedtime)   melatonin 3 mg oral tablet: 1 tab(s) orally once a day (at bedtime)  menthol-benzocaine 3.6 mg-15 mg mucous membrane lozenge: 1 lozenge mucous membrane 3 times a day, As Needed  metFORMIN 1000 mg oral tablet: 1 tab(s) orally 2 times a day  pantoprazole 40 mg oral delayed release tablet: 1 tab(s) orally once a day (before a meal)  polyethylene glycol 3350 oral powder for reconstitution: 17 gram(s) orally every 12 hours, As Needed for constipation (if no BM x 2 days).   senna oral tablet: 2 tab(s) orally once a day (at bedtime)  sodium chloride 1 g oral tablet: 1 tab(s) orally 2 times a day   test strips (per patient&#x27;s insurance): 1 application subcutaneously 4 times a day. ** Compatible with patient&#x27;s glucometer **   acetaminophen 325 mg oral tablet: 2 tab(s) orally every 6 hours, As needed, Temp greater or equal to 38C (100.4F), Mild Pain (1 - 3)  alcohol swabs : Apply topically to affected area 4 times a day   atorvastatin 20 mg oral tablet: 1 tab(s) orally once a day (at bedtime)  dexamethasone 2 mg oral tablet: 1 tab(s) orally 2 times a day  escitalopram 5 mg oral tablet: 1 tab(s) orally once a day  fluticasone 50 mcg/inh nasal spray: 1 spray(s) nasal 2 times a day  gabapentin 100 mg oral capsule: 1 cap(s) orally once a day (at bedtime)  glucometer (per patient&#x27;s insurance): Test blood sugars four times a day. Dispense #1 glucometer.  HumaLOG KwikPen 100 units/mL injectable solution: 5 unit(s) subcutaneous 3 times a day (with meals)   Insulin Pen Needles, 4mm: 1 application subcutaneously 4 times a day. ** Use with insulin pen **   lacosamide 100 mg oral tablet: 1 tab(s) orally 2 times a day MDD:2  lancets: 1 application subcutaneously 4 times a day   Lantus Solostar Pen 100 units/mL subcutaneous solution: 16 unit(s) subcutaneous once a day (at bedtime)   melatonin 3 mg oral tablet: 1 tab(s) orally once a day (at bedtime)  menthol-benzocaine 3.6 mg-15 mg mucous membrane lozenge: 1 lozenge mucous membrane 3 times a day, As Needed  metFORMIN 1000 mg oral tablet: 1 tab(s) orally 2 times a day  pantoprazole 40 mg oral delayed release tablet: 1 tab(s) orally once a day (before a meal)  polyethylene glycol 3350 oral powder for reconstitution: 17 gram(s) orally every 12 hours, As Needed for constipation (if no BM x 2 days).   senna oral tablet: 2 tab(s) orally once a day (at bedtime)  sodium chloride 1 g oral tablet: 1 tab(s) orally 2 times a day   test strips (per patient&#x27;s insurance): 1 application subcutaneously 4 times a day. ** Compatible with patient&#x27;s glucometer **

## 2022-02-23 NOTE — DISCHARGE NOTE PROVIDER - CARE PROVIDER_API CALL
Parker Gupta)  Neurosurgery  [address] [place] [address]  Phone: [phone]  Fax: [phone]  Follow Up Time: 2 weeks    Matthew Hill)  Neurology  [facility], [address] [place] [address]  Phone: [phone]  Fax: [phone]  Follow Up Time: 1 week    Kristopher Guevara  RADIATION ONCOLOGY  [address] [place] [address] - [place] [address]  Phone: [phone]  Fax: [phone]  Follow Up Time: 1 week   Parker Gupta)  Neurosurgery  [address] [place] [address]  Phone: [phone]  Fax: [phone]  Follow Up Time: 2 weeks    Matthew Hill)  Neurology  [facility], [address] [place] [address]  Phone: [phone]  Fax: [phone]  Follow Up Time: 1 week    Kristopher Guevara  RADIATION ONCOLOGY  [address] [place] [address] - [place] [address]  Phone: [phone]  Fax: [phone]  Follow Up Time: 1 week    Vaughn Mitchell)  Cardiology; Internal Medicine  [address] [facility], [address]  [place] [address]  Phone: [phone]  Fax: [phone]  Follow Up Time: 1 week

## 2022-02-24 VITALS
SYSTOLIC BLOOD PRESSURE: 108 MMHG | OXYGEN SATURATION: 96 % | DIASTOLIC BLOOD PRESSURE: 57 MMHG | TEMPERATURE: 98 F | HEART RATE: 72 BPM | RESPIRATION RATE: 16 BRPM

## 2022-02-24 LAB
ALBUMIN SERPL ELPH-MCNC: 2.5 G/DL — LOW (ref 3.3–5)
ALP SERPL-CCNC: 65 U/L — SIGNIFICANT CHANGE UP (ref 40–120)
ALT FLD-CCNC: 17 U/L — SIGNIFICANT CHANGE UP (ref 10–45)
ANION GAP SERPL CALC-SCNC: 10 MMOL/L — SIGNIFICANT CHANGE UP (ref 5–17)
AST SERPL-CCNC: 11 U/L — SIGNIFICANT CHANGE UP (ref 10–40)
BILIRUB SERPL-MCNC: 0.6 MG/DL — SIGNIFICANT CHANGE UP (ref 0.2–1.2)
BUN SERPL-MCNC: 22 MG/DL — SIGNIFICANT CHANGE UP (ref 7–23)
CALCIUM SERPL-MCNC: 8.9 MG/DL — SIGNIFICANT CHANGE UP (ref 8.4–10.5)
CHLORIDE SERPL-SCNC: 100 MMOL/L — SIGNIFICANT CHANGE UP (ref 96–108)
CO2 SERPL-SCNC: 29 MMOL/L — SIGNIFICANT CHANGE UP (ref 22–31)
CREAT SERPL-MCNC: 0.99 MG/DL — SIGNIFICANT CHANGE UP (ref 0.5–1.3)
GLUCOSE BLDC GLUCOMTR-MCNC: 126 MG/DL — HIGH (ref 70–99)
GLUCOSE BLDC GLUCOMTR-MCNC: 239 MG/DL — HIGH (ref 70–99)
GLUCOSE SERPL-MCNC: 156 MG/DL — HIGH (ref 70–99)
HCT VFR BLD CALC: 37.1 % — LOW (ref 39–50)
HGB BLD-MCNC: 11.7 G/DL — LOW (ref 13–17)
MCHC RBC-ENTMCNC: 30.8 PG — SIGNIFICANT CHANGE UP (ref 27–34)
MCHC RBC-ENTMCNC: 31.5 GM/DL — LOW (ref 32–36)
MCV RBC AUTO: 97.6 FL — SIGNIFICANT CHANGE UP (ref 80–100)
NRBC # BLD: 0 /100 WBCS — SIGNIFICANT CHANGE UP (ref 0–0)
PLATELET # BLD AUTO: 241 K/UL — SIGNIFICANT CHANGE UP (ref 150–400)
POTASSIUM SERPL-MCNC: 4.1 MMOL/L — SIGNIFICANT CHANGE UP (ref 3.5–5.3)
POTASSIUM SERPL-SCNC: 4.1 MMOL/L — SIGNIFICANT CHANGE UP (ref 3.5–5.3)
PROT SERPL-MCNC: 6.8 G/DL — SIGNIFICANT CHANGE UP (ref 6–8.3)
RBC # BLD: 3.8 M/UL — LOW (ref 4.2–5.8)
RBC # FLD: 12.8 % — SIGNIFICANT CHANGE UP (ref 10.3–14.5)
SARS-COV-2 RNA SPEC QL NAA+PROBE: SIGNIFICANT CHANGE UP
SODIUM SERPL-SCNC: 139 MMOL/L — SIGNIFICANT CHANGE UP (ref 135–145)
WBC # BLD: 7.24 K/UL — SIGNIFICANT CHANGE UP (ref 3.8–10.5)
WBC # FLD AUTO: 7.24 K/UL — SIGNIFICANT CHANGE UP (ref 3.8–10.5)

## 2022-02-24 PROCEDURE — 97530 THERAPEUTIC ACTIVITIES: CPT

## 2022-02-24 PROCEDURE — 80053 COMPREHEN METABOLIC PANEL: CPT

## 2022-02-24 PROCEDURE — 97167 OT EVAL HIGH COMPLEX 60 MIN: CPT

## 2022-02-24 PROCEDURE — 94640 AIRWAY INHALATION TREATMENT: CPT

## 2022-02-24 PROCEDURE — 74230 X-RAY XM SWLNG FUNCJ C+: CPT

## 2022-02-24 PROCEDURE — 87635 SARS-COV-2 COVID-19 AMP PRB: CPT

## 2022-02-24 PROCEDURE — 92507 TX SP LANG VOICE COMM INDIV: CPT

## 2022-02-24 PROCEDURE — 97116 GAIT TRAINING THERAPY: CPT

## 2022-02-24 PROCEDURE — 82962 GLUCOSE BLOOD TEST: CPT

## 2022-02-24 PROCEDURE — 36415 COLL VENOUS BLD VENIPUNCTURE: CPT

## 2022-02-24 PROCEDURE — 92523 SPEECH SOUND LANG COMPREHEN: CPT

## 2022-02-24 PROCEDURE — 97112 NEUROMUSCULAR REEDUCATION: CPT

## 2022-02-24 PROCEDURE — U0003: CPT

## 2022-02-24 PROCEDURE — 87081 CULTURE SCREEN ONLY: CPT

## 2022-02-24 PROCEDURE — 97129 THER IVNTJ 1ST 15 MIN: CPT

## 2022-02-24 PROCEDURE — 99238 HOSP IP/OBS DSCHRG MGMT 30/<: CPT

## 2022-02-24 PROCEDURE — 97535 SELF CARE MNGMENT TRAINING: CPT

## 2022-02-24 PROCEDURE — U0005: CPT

## 2022-02-24 PROCEDURE — 92610 EVALUATE SWALLOWING FUNCTION: CPT

## 2022-02-24 PROCEDURE — 85027 COMPLETE CBC AUTOMATED: CPT

## 2022-02-24 PROCEDURE — 97163 PT EVAL HIGH COMPLEX 45 MIN: CPT

## 2022-02-24 PROCEDURE — 92611 MOTION FLUOROSCOPY/SWALLOW: CPT

## 2022-02-24 PROCEDURE — 97110 THERAPEUTIC EXERCISES: CPT

## 2022-02-24 RX ORDER — INSULIN LISPRO 100/ML
5 VIAL (ML) SUBCUTANEOUS
Qty: 5 | Refills: 0
Start: 2022-02-24 | End: 2022-03-25

## 2022-02-24 RX ORDER — SODIUM CHLORIDE 9 MG/ML
1 INJECTION INTRAMUSCULAR; INTRAVENOUS; SUBCUTANEOUS
Qty: 60 | Refills: 0
Start: 2022-02-24 | End: 2022-03-25

## 2022-02-24 RX ORDER — ISOPROPYL ALCOHOL, BENZOCAINE .7; .06 ML/ML; ML/ML
1 SWAB TOPICAL
Qty: 100 | Refills: 1
Start: 2022-02-24 | End: 2022-04-14

## 2022-02-24 RX ORDER — SODIUM CHLORIDE 9 MG/ML
1 INJECTION INTRAMUSCULAR; INTRAVENOUS; SUBCUTANEOUS
Refills: 0 | Status: DISCONTINUED | OUTPATIENT
Start: 2022-02-24 | End: 2022-02-24

## 2022-02-24 RX ORDER — INSULIN LISPRO 100/ML
6 VIAL (ML) SUBCUTANEOUS
Qty: 3 | Refills: 0
Start: 2022-02-24 | End: 2022-03-25

## 2022-02-24 RX ADMIN — Medication 4: at 12:48

## 2022-02-24 RX ADMIN — SODIUM CHLORIDE 1 GRAM(S): 9 INJECTION INTRAMUSCULAR; INTRAVENOUS; SUBCUTANEOUS at 05:26

## 2022-02-24 RX ADMIN — METFORMIN HYDROCHLORIDE 1000 MILLIGRAM(S): 850 TABLET ORAL at 07:28

## 2022-02-24 RX ADMIN — Medication 5 UNIT(S): at 07:28

## 2022-02-24 RX ADMIN — Medication 1 SPRAY(S): at 05:27

## 2022-02-24 RX ADMIN — LACOSAMIDE 100 MILLIGRAM(S): 50 TABLET ORAL at 05:26

## 2022-02-24 RX ADMIN — Medication 2 MILLIGRAM(S): at 05:26

## 2022-02-24 RX ADMIN — Medication 5 UNIT(S): at 12:48

## 2022-02-24 RX ADMIN — ENOXAPARIN SODIUM 40 MILLIGRAM(S): 100 INJECTION SUBCUTANEOUS at 12:47

## 2022-02-24 RX ADMIN — PANTOPRAZOLE SODIUM 40 MILLIGRAM(S): 20 TABLET, DELAYED RELEASE ORAL at 05:26

## 2022-02-24 RX ADMIN — ESCITALOPRAM OXALATE 5 MILLIGRAM(S): 10 TABLET, FILM COATED ORAL at 12:47

## 2022-02-24 NOTE — PROGRESS NOTE ADULT - PROVIDER SPECIALTY LIST ADULT
Hospitalist
Neurology
Neurology
Hospitalist
Neurology
Physiatry
Rehab Medicine
Hospitalist
Neurology
Rehab Medicine
Rehab Medicine
Hospitalist
Neurology
Physiatry
Rehab Medicine
Rehab Medicine
Hospitalist
Hospitalist
Rehab Medicine

## 2022-02-24 NOTE — ADVANCED PRACTICE NURSE CONSULT - ASSESSMENT
Pt being discharge today. Spoke to daughter, Roxanne. Daughter has been educated on insulin pen use but not BG meter. Will ask primary RN to teach BG meter today before discharge. 
PHI: 65 y/o man with PMH of  HTN, DM2, and recent discovery of brain Glioblastoma s/p 1/27/22 for left sided craniotomy. Post-op course was complicated by possible hyponatermia with salt tabs added to regimen. 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 He was admitted to [facility] for functional and gait impairment   HgbA1c – 10.7 (1/25/2022)  eGFR- 90 (2/14/2022)  POCT range- 113- 307 – spiking midday- on Decadron twice a day.     Current In-patient Diabetes medications:  insulin glargine Injectable (LANTUS) 16 Unit(s) SubCutaneous at bedtime  insulin lispro (ADMELOG) low corrective regimen sliding scale   SubCutaneous three times a day before meals  insulin lispro (ADMELOG) low corrective regimen sliding scale   SubCutaneous at bedtime  insulin lispro Injectable (ADMELOG) 5 Unit(s) SubCutaneous three times a day before meals    PCP: Avery Juarez [phone]    Home diabetes Regimen:  metFORMIN 1000 mg oral tablet: 1 tab(s) orally 2 times a day     Pt with periods of confusion.  Spoke with pt’s daughter- Roxana [phone] who reports that pt was not taking his metformin prior to admission for an unknown periods of time due to Glioblastoma effects and hospitalization in [facility] [place]. Daughter states that pt was able to check his own BG in past but prior this this hospitalization and upon pts return from , family purchased and Contour next BG meter and began checking pt’s BG.  Pt never told insulin and pt/family will need insulin teaching.    Reviewed when to check BG depending on insulin pt discharged on, s/s and tx of hypoglycemia with daughter and left written resources of Leaving the hospital with Diabetes, BG goals, A1c Goals, Preventing complications due to diabetes, Insulin pen use, sharps disposal, BG monitoring, S/S and Tx of hypoglycemia for daughter to .   Informed primary Rn- Diane Mackey that pt’s family will need to learn insulin pen use prior to discharge.    Retinopathy hx – none know but pt wear glasses- last exam June 2021 per daughter  Renal function- eGFR 90  Feet- intact – denies numbness, burning or pain- educated daughter on daily diabetes foot care  Teeth- no upper teeth- had dentures which per daughter are lost. Missing teeth on bottom. Educated daughter on need for dental exam/cleaning every 6 months.

## 2022-02-24 NOTE — ADVANCED PRACTICE NURSE CONSULT - RECOMMEDATIONS
Discharge Recommendations:  1. Lantus Solostar Pen 100 units/ml (on favorite list)   16 units SubCutaneous DAILY- dose per needs at time of discharge    2. Humalog kwikpen 100 units/ml SubCutaneous, moderate correction scale before meals, three times/day (on favorite list)   3. Insulin pen needles 4 mm- yahaira- (on favorite list)  4. Alcohol swabs- (on favorite list)  5. BG meter per insurance- (on favorite list)  6. BG test strips per insurance- (on favorite list)  7. Lancets- per insurance -(on favorite list)  8. Metformin  mg tablets orally- 2 tablets 2 times/day  9. Glucose Tablets 4 Gms- take 4 tablets as needed for BG <  70  10. Follow up with PCP Avery Juarez [phone]  11. Would benefit from Endocrinology consult upon discharge for CV risk lower diabetes agents.

## 2022-02-24 NOTE — PROGRESS NOTE ADULT - ATTENDING COMMENTS
No HA, SZ, daily gains in therapy.    Multidisciplinary team meeting today:  patient's functional goals and needs, functional and clinical  progress were discussed, barriers to discharge were identified. Anticipate discharge home with home care, 24/7 supervision for safety.  EDOD 02/25/22      > 35 min spent, > 50 % coordinating care
Patient medically and neurologically  stable. Making progress towards rehab goals.   Continue rehab program. Discharge plan discussed with SW, team.
Seen with NP, Ms Herminio Lakhani,    Glioblastoma s/p excision,   On stable dose steroid    No acute interval vents  Throat discomfort mildly relieved with Tylenol with plan for ENT F/U post dc  No external injury or erythema noted     Engaging in therapy, working on progressive ambulation and stairs negotiatin    Continue PT/OT  Commence melatonin for insomnia  Continue conservative mgt for throat discomfort, ENT f/u on discharge or earlier if persistent discomfort continue  DC as noted at team conference
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
Seen with NP, Ms Herminio Lakhani,    Glioblastoma s/p excision, with SIADH  On stable dose steroid and salt tabs  Na level maintained, with plan to gradually d/c salt tabs  Insomnia responding to melatonin  No head ache on review today    Sustained functional improvement, increased ambulatory distance,  Pain controlled. throat discomfort less prominent     Clinically stable for discharge  AAO X 3  Balance and gait improved    DC home as planned, post dc instructions as in dc summary  F/u with ENT  F/u with neurosurgery and PCP for Na monitoring and gradually d/c salt tabs   ENT f/u for incomplete vocal cord closure

## 2022-02-24 NOTE — PROGRESS NOTE ADULT - NUTRITIONAL ASSESSMENT
This patient has been assessed with a concern for Malnutrition and has been determined to have a diagnosis/diagnoses of Severe protein-calorie malnutrition.    This patient is being managed with:   Diet Pureed-  Consistent Carbohydrate {No Snacks}  Mildly Thick Liquids (MILDTHICKLIQS)  Supplement Feeding Modality:  Oral  Glucerna Shake Cans or Servings Per Day:  1       Frequency:  Two Times a day  Entered: Feb 14 2022 10:25AM    
This patient has been assessed with a concern for Malnutrition and has been determined to have a diagnosis/diagnoses of Severe protein-calorie malnutrition.    This patient is being managed with:   Diet Regular-  Consistent Carbohydrate {No Snacks}  Supplement Feeding Modality:  Oral  Glucerna Shake Cans or Servings Per Day:  1       Frequency:  Daily  Entered: Feb 16 2022 10:07AM    
This patient has been assessed with a concern for Malnutrition and has been determined to have a diagnosis/diagnoses of Severe protein-calorie malnutrition.    This patient is being managed with:   Diet Pureed-  Consistent Carbohydrate {No Snacks}  Mildly Thick Liquids (MILDTHICKLIQS)  Supplement Feeding Modality:  Oral  Glucerna Shake Cans or Servings Per Day:  1       Frequency:  Two Times a day  Entered: Feb 14 2022 10:25AM    
This patient has been assessed with a concern for Malnutrition and has been determined to have a diagnosis/diagnoses of Severe protein-calorie malnutrition.    This patient is being managed with:   Diet Regular-  Consistent Carbohydrate {No Snacks}  Supplement Feeding Modality:  Oral  Glucerna Shake Cans or Servings Per Day:  1       Frequency:  Daily  Entered: Feb 16 2022 10:07AM    
This patient has been assessed with a concern for Malnutrition and has been determined to have a diagnosis/diagnoses of Severe protein-calorie malnutrition.    This patient is being managed with:   Diet Regular-  Consistent Carbohydrate {No Snacks}  Supplement Feeding Modality:  Oral  Glucerna Shake Cans or Servings Per Day:  1       Frequency:  Daily  Entered: Feb 16 2022 10:07AM    
This patient has been assessed with a concern for Malnutrition and has been determined to have a diagnosis/diagnoses of Severe protein-calorie malnutrition.    This patient is being managed with:   Diet Pureed-  Consistent Carbohydrate {No Snacks}  Mildly Thick Liquids (MILDTHICKLIQS)  Supplement Feeding Modality:  Oral  Glucerna Shake Cans or Servings Per Day:  1       Frequency:  Two Times a day  Entered: Feb 14 2022 10:25AM    
This patient has been assessed with a concern for Malnutrition and has been determined to have a diagnosis/diagnoses of Severe protein-calorie malnutrition.    This patient is being managed with:   Diet Easy to Chew-  Consistent Carbohydrate {No Snacks}  1500mL Fluid Restriction ([id number])  Supplement Feeding Modality:  Oral  Glucerna Shake Cans or Servings Per Day:  1       Frequency:  Two Times a day  Entered: Feb 12 2022 11:39AM    
This patient has been assessed with a concern for Malnutrition and has been determined to have a diagnosis/diagnoses of Severe protein-calorie malnutrition.    This patient is being managed with:   Diet Pureed-  Consistent Carbohydrate {No Snacks}  Mildly Thick Liquids (MILDTHICKLIQS)  Supplement Feeding Modality:  Oral  Glucerna Shake Cans or Servings Per Day:  1       Frequency:  Two Times a day  Entered: Feb 14 2022 10:25AM    
This patient has been assessed with a concern for Malnutrition and has been determined to have a diagnosis/diagnoses of Severe protein-calorie malnutrition.    This patient is being managed with:   Diet Regular-  Consistent Carbohydrate {No Snacks}  Supplement Feeding Modality:  Oral  Glucerna Shake Cans or Servings Per Day:  1       Frequency:  Daily  Entered: Feb 16 2022 10:07AM    
This patient has been assessed with a concern for Malnutrition and has been determined to have a diagnosis/diagnoses of Severe protein-calorie malnutrition.    This patient is being managed with:   Diet Pureed-  Consistent Carbohydrate {No Snacks}  1500mL Fluid Restriction ([id number])  Mildly Thick Liquids (MILDTHICKLIQS)  Supplement Feeding Modality:  Oral  Glucerna Shake Cans or Servings Per Day:  1       Frequency:  Two Times a day  Entered: Feb 12 2022  1:57PM    
This patient has been assessed with a concern for Malnutrition and has been determined to have a diagnosis/diagnoses of Severe protein-calorie malnutrition.    This patient is being managed with:   Diet Regular-  Consistent Carbohydrate {No Snacks}  Supplement Feeding Modality:  Oral  Glucerna Shake Cans or Servings Per Day:  1       Frequency:  Daily  Entered: Feb 16 2022 10:07AM    

## 2022-02-24 NOTE — PROGRESS NOTE ADULT - REASON FOR ADMISSION
Glioblastoma
Gliosarcoma
Glioblastoma

## 2022-02-24 NOTE — PROGRESS NOTE ADULT - SUBJECTIVE AND OBJECTIVE BOX
HISTORY OF PRESENT ILLNESS  This is a 65 YO male with PMH of  HTN, DM2, and known brain mass. He was in the [place] for vacation in early January, when pt was noted to be confused and not answering appropriately, so an MRI was done on Jan 7 showed L frontal enhancing mass with significant edema. He presented to [facility] on 1/24 for further evaluation and repeat CTH showed significant vasogenic edema and 1.5cm MLS. He underwent 1/27/22 for left sided craniotomy for tumor resection with gleolan.  Pt was started on decadron and Vimpat also.  Post op course was complicated by possible seizures with EEG negative. Hyponatermia with salt tabs added to regimen. 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 He was evaluated by PMR and was recommended for acute inpatient rehab. He was admitted to [facility] on 02/08/22, however COVID test came back positive. COVID tests from 02/04 and 02/06/22 were negative. Patient remains asymptomatic.     COVID swab from 2/9/2022 and 2/10/2022 are both negative. Per medical team, he does not need isolation at this time. COVID swab from 2/8/2022 was likely a false positive. He was seen by ENT for hoarseness - vocal cords mobile but with incomplete closure and Continue with speech and swallow therapy. Follow up with ENT/Laryngology, Dr. Graham Bergeron [phone] 2 weeks after discharge. Patient stable to return to [facility]- IRU  on 2/11/22.      CHIEF COMPLAINT:    Seen and examined  Coughing less.  Mild sore throat rated at 4/10 - pain with swallowing (improving)  Discussed ENT impression - incomplete vocal cord closure and outpatient f/u   HA 3/10 - tolerable.  Educated that if HA 8-10/10 to contact neurosurgery office  Discussed discharge - medications (remain on steroid til Neurosurgery f/u, seizure ppx), outpatient followups, and restrictions (no driving).  He feels ready for discharge.  Denies CP/palpitation, SOB, dizziness, nausea/vomiting, abd pain, constipation or dysuria/frequency. LBM yesterday 2/23  Only consumed 30% of breakfast tray - did not want to eat eggs (been eating it daily) but didn't want to inconvenient staff.  Open to having pancake instead.    REVIEW OF SYSTEMS  [X] Constitutional WNL            [X] Cardio WNL            [X] Resp cough improving  [X] GI WNL; LBM 2/23             [X]  WNL                 [X] Heme WNL              [X] Endo WNL                        [X] Skin WNL                [X] MSK WNL            [X] Neuro - minimal HA 3/10   [X] Cognitive WNL        [X] Psych WNL    VITALS  Vital Signs Last 24 Hrs  T(C): 36.7 (24 Feb 2022 07:31), Max: 36.8 (23 Feb 2022 20:04)  T(F): 98 (24 Feb 2022 07:31), Max: 98.2 (23 Feb 2022 20:04)  HR: 72 (24 Feb 2022 07:31) (72 - 80)  BP: 108/57 (24 Feb 2022 07:31) (107/65 - 108/57)  RR: 16 (24 Feb 2022 07:31) (16 - 16)  SpO2: 96% (24 Feb 2022 07:31) (96% - 97%)    PHYSICAL EXAM  Constitutional - NAD, Comfortable  HEENT - NCAT, EOMI  Neck - Supple, No limited ROM  Chest - CTA  Cardiovascular - RRR, S1S2  Abdomen -  Soft, non tender/non distended, +BS  Extremities - No C/C/E, No calf tenderness   Scalp surgical wound healing well (closed)  Neurologic Exam -  no new  focal deficits                      RECENT LABS   LAB                        11.7   7.24  )-----------( 241      ( 24 Feb 2022 06:00 )             37.1     02-24    139  |  100  |  22  ----------------------------<  156<H>  4.1   |  29  |  0.99    Ca    8.9      24 Feb 2022 06:00    TPro  6.8  /  Alb  2.5<L>  /  TBili  0.6  /  DBili  x   /  AST  11  /  ALT  17  /  AlkPhos  65  02-24    LIVER FUNCTIONS - ( 24 Feb 2022 06:00 )  Alb: 2.5 g/dL / Pro: 6.8 g/dL / ALK PHOS: 65 U/L / ALT: 17 U/L / AST: 11 U/L / GGT: x             CAPILLARY BLOOD GLUCOSE  POCT Blood Glucose.: 126 mg/dL (24 Feb 2022 07:26)  POCT Blood Glucose.: 127 mg/dL (23 Feb 2022 21:46)  POCT Blood Glucose.: 239 mg/dL (23 Feb 2022 16:26)  POCT Blood Glucose.: 215 mg/dL (23 Feb 2022 11:25)    MEDICATIONS  (STANDING):  atorvastatin 20 milliGRAM(s) Oral at bedtime  dexAMETHasone     Tablet 2 milliGRAM(s) Oral two times a day  dextrose 40% Gel 15 Gram(s) Oral once  dextrose 5%. 1000 milliLiter(s) (50 mL/Hr) IV Continuous <Continuous>  dextrose 5%. 1000 milliLiter(s) (100 mL/Hr) IV Continuous <Continuous>  dextrose 50% Injectable 25 Gram(s) IV Push once  dextrose 50% Injectable 12.5 Gram(s) IV Push once  dextrose 50% Injectable 25 Gram(s) IV Push once  enoxaparin Injectable 40 milliGRAM(s) SubCutaneous daily  escitalopram 5 milliGRAM(s) Oral daily  fluticasone propionate 50 MICROgram(s)/spray Nasal Spray 1 Spray(s) Both Nostrils two times a day  gabapentin 100 milliGRAM(s) Oral at bedtime  glucagon  Injectable 1 milliGRAM(s) IntraMuscular once  insulin glargine Injectable (LANTUS) 16 Unit(s) SubCutaneous at bedtime  insulin lispro (ADMELOG) corrective regimen sliding scale   SubCutaneous three times a day before meals  insulin lispro Injectable (ADMELOG) 5 Unit(s) SubCutaneous three times a day before meals  lacosamide Solution 100 milliGRAM(s) Oral two times a day  melatonin 3 milliGRAM(s) Oral at bedtime  metFORMIN 1000 milliGRAM(s) Oral two times a day  pantoprazole    Tablet 40 milliGRAM(s) Oral before breakfast  polyethylene glycol 3350 17 Gram(s) Oral every 12 hours  senna 2 Tablet(s) Oral at bedtime  sodium chloride 1 Gram(s) Oral every 8 hours    MEDICATIONS  (PRN):  acetaminophen     Tablet .. 650 milliGRAM(s) Oral every 6 hours PRN Temp greater or equal to 38C (100.4F), Mild Pain (1 - 3)  benzocaine 15 mG/menthol 3.6 mG Lozenge 1 Lozenge Oral every 4 hours PRN Sore Throat     HISTORY OF PRESENT ILLNESS  This is a 65 YO male with PMH of  HTN, DM2, and known brain mass. He was in the [place] for vacation in early January, when pt was noted to be confused and not answering appropriately, so an MRI was done on Jan 7 showed L frontal enhancing mass with significant edema. He presented to [facility] on 1/24 for further evaluation and repeat CTH showed significant vasogenic edema and 1.5cm MLS. He underwent 1/27/22 for left sided craniotomy for tumor resection with gleolan.  Pt was started on decadron and Vimpat also.  Post op course was complicated by possible seizures with EEG negative. Hyponatermia with salt tabs added to regimen. 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 He was evaluated by PMR and was recommended for acute inpatient rehab. He was admitted to [facility] on 02/08/22, however COVID test came back positive. COVID tests from 02/04 and 02/06/22 were negative. Patient remains asymptomatic.     COVID swab from 2/9/2022 and 2/10/2022 are both negative. Per medical team, he does not need isolation at this time. COVID swab from 2/8/2022 was likely a false positive. He was seen by ENT for hoarseness - vocal cords mobile but with incomplete closure and Continue with speech and swallow therapy. Follow up with ENT/Laryngology, Dr. Graham Bergeron [phone] 2 weeks after discharge. Patient stable to return to [facility]- IRU  on 2/11/22.      CHIEF COMPLAINT:    Seen and examined  Coughing less.  Mild sore throat rated at 4/10 - pain with swallowing (improving)  Discussed ENT impression - incomplete vocal cord closure and outpatient f/u   HA 3/10 - tolerable.  Educated that if HA 8-10/10 to contact neurosurgery office  Discussed discharge - medications (remain on steroid til Neurosurgery f/u, seizure ppx), outpatient followups, and restrictions (no driving).  He feels ready for discharge.  Denies CP/palpitation, SOB, dizziness, nausea/vomiting, abd pain, constipation or dysuria/frequency. LBM yesterday 2/23  Only consumed 30% of breakfast tray - did not want to eat eggs (been eating it daily) but didn't want to inconvenient staff.  Open to having pancake instead.    REVIEW OF SYSTEMS  [X] Constitutional WNL            [X] Cardio WNL            [X] Resp cough improving  [X] GI WNL; LBM 2/23             [X]  WNL                 [X] Heme WNL              [X] Endo WNL                        [X] Skin WNL                [X] MSK WNL            [X] Neuro - minimal HA 3/10   [X] Cognitive WNL        [X] Psych WNL    VITALS  Vital Signs Last 24 Hrs  T(C): 36.7 (24 Feb 2022 07:31), Max: 36.8 (23 Feb 2022 20:04)  T(F): 98 (24 Feb 2022 07:31), Max: 98.2 (23 Feb 2022 20:04)  HR: 72 (24 Feb 2022 07:31) (72 - 80)  BP: 108/57 (24 Feb 2022 07:31) (107/65 - 108/57)  RR: 16 (24 Feb 2022 07:31) (16 - 16)  SpO2: 96% (24 Feb 2022 07:31) (96% - 97%)    PHYSICAL EXAM  Constitutional - NAD, Comfortable  HEENT - NCAT, EOMI  Neck - Supple, No limited ROM  Chest - CTA  Cardiovascular - RRR, S1S2  Abdomen -  Soft, non tender/non distended, +BS  Extremities - No C/C/E, No calf tenderness   Scalp surgical wound healing well (closed)  Neurologic Exam -  no new  focal deficits                      RECENT LABS   LAB                        11.7   7.24  )-----------( 241      ( 24 Feb 2022 06:00 )             37.1     02-24    139  |  100  |  22  ----------------------------<  156<H>  4.1   |  29  |  0.99    Ca    8.9      24 Feb 2022 06:00    TPro  6.8  /  Alb  2.5<L>  /  TBili  0.6  /  DBili  x   /  AST  11  /  ALT  17  /  AlkPhos  65  02-24    LIVER FUNCTIONS - ( 24 Feb 2022 06:00 )  Alb: 2.5 g/dL / Pro: 6.8 g/dL / ALK PHOS: 65 U/L / ALT: 17 U/L / AST: 11 U/L / GGT: x           CAPILLARY BLOOD GLUCOSE  POCT Blood Glucose.: 126 mg/dL (24 Feb 2022 07:26)  POCT Blood Glucose.: 127 mg/dL (23 Feb 2022 21:46)  POCT Blood Glucose.: 239 mg/dL (23 Feb 2022 16:26)  POCT Blood Glucose.: 215 mg/dL (23 Feb 2022 11:25)    MEDICATIONS  (STANDING):  atorvastatin 20 milliGRAM(s) Oral at bedtime  dexAMETHasone     Tablet 2 milliGRAM(s) Oral two times a day  dextrose 40% Gel 15 Gram(s) Oral once  dextrose 5%. 1000 milliLiter(s) (50 mL/Hr) IV Continuous <Continuous>  dextrose 5%. 1000 milliLiter(s) (100 mL/Hr) IV Continuous <Continuous>  dextrose 50% Injectable 25 Gram(s) IV Push once  dextrose 50% Injectable 12.5 Gram(s) IV Push once  dextrose 50% Injectable 25 Gram(s) IV Push once  enoxaparin Injectable 40 milliGRAM(s) SubCutaneous daily  escitalopram 5 milliGRAM(s) Oral daily  fluticasone propionate 50 MICROgram(s)/spray Nasal Spray 1 Spray(s) Both Nostrils two times a day  gabapentin 100 milliGRAM(s) Oral at bedtime  glucagon  Injectable 1 milliGRAM(s) IntraMuscular once  insulin glargine Injectable (LANTUS) 16 Unit(s) SubCutaneous at bedtime  insulin lispro (ADMELOG) corrective regimen sliding scale   SubCutaneous three times a day before meals  insulin lispro Injectable (ADMELOG) 5 Unit(s) SubCutaneous three times a day before meals  lacosamide Solution 100 milliGRAM(s) Oral two times a day  melatonin 3 milliGRAM(s) Oral at bedtime  metFORMIN 1000 milliGRAM(s) Oral two times a day  pantoprazole    Tablet 40 milliGRAM(s) Oral before breakfast  polyethylene glycol 3350 17 Gram(s) Oral every 12 hours  senna 2 Tablet(s) Oral at bedtime  sodium chloride 1 Gram(s) Oral every 8 hours    MEDICATIONS  (PRN):  acetaminophen     Tablet .. 650 milliGRAM(s) Oral every 6 hours PRN Temp greater or equal to 38C (100.4F), Mild Pain (1 - 3)  benzocaine 15 mG/menthol 3.6 mG Lozenge 1 Lozenge Oral every 4 hours PRN Sore Throat

## 2022-02-24 NOTE — PROGRESS NOTE ADULT - ASSESSMENT
ASSESSMENT/PLAN  66 year old male with PMH of  HTN, DM2, and known brain mass. He was in the [place] for vacation in early January, when he was noted to be confused and not answering appropriately, so an MRI was done on Jan 7 showed L frontal enhancing mass with significant edema. He presented to [facility] on 1/24 for further evaluation and repeat CTH which  showed significant vasogenic edema and 1.5cm MLS. He underwent 1/27/22 for left sided craniotomy for tumor resection with gleolan. Post-op course was complicated by possible seizures with EEG negative, hyponatermia with salt tabs added to regimen. 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 He was admitted to [facility] but tested positive for covid. He was asymptomatic but trasnferred to medical floor for quarantine. His subsequent covid swabs came back negative. Patient was deemed stable to return to [facility]  on 2/11/22. Patient  with gait Instability, ADL impairments and Functional impairments.    * Throat discomfort due to incomplete vocal cord closure f/u with ENT post d/c  * Insomnia, will give melatonin if persistent    #Left frontal tumor s/p craniotomy for resection with right sided drift, Gait Instability, ADL impairments and Functional impairments  - Monitor incision -staples removed 2/14 - tolerated well  -Vimpat 100mg BID for seizure prophylaxis  -Decadron slow taper- now on 2mg Q12h - to maintain until follow up with neurosurgery   -follow up with neuro-oncology     #Cough/hoarseness   - has Cepacol PRN  - Seen by ENT - has vocal cords mobile but with incomplete closure  - will follow up with Dr. Graham Bergeron [phone] 2 weeks after discharge.  - Encouraged tylenol use for discomfort.      #Depression  - Lexapro    #Hyponatremia  -salt tabs 1G Q8h - dec BID  - monitor by PCP outpatient  - Na 139 (2/24)    #HTN  -Home med: lisinopril 5mg daily  -BP controlled off Med (107/65 - 110/75) 2/24    #HLD   -atorvastatin 20mg daily    #Type 2 DM  - A1c is 10.7 (1/25)  -FS and ISS  - Lantus 16 units at bedtime  - Admelog 5 AC  - metformin 1000 BID (2/21)    #Pain control  - Tylenol PRN    #Covid 19 - false positive   - positive on 2/8- likely false positive  - Negative  2/9, 2/10, 2/18  - Continues to be asymptomatic    #GI/Bowel Mgmt   - Senna at bedtime   - Miralax BID    #DVT  - Lovenox  - SCDs  - TEDs     #Skin:  -Incisional wound care per nursing. Surgical staples removed, wound intact (closed)    FEN   - Diet -regular with thin liquids [CCHO, DASH/TLC]    -Passed MBS 2/16    - Dysphagia  SLP - evaluation and treatment    Outpatient Follow-up (Specialty/Name of physician):  Parker Gupta)  Neurosurgery  [address] [place] [address]  Phone: [phone]  Fax: [phone]  Follow Up Time:     Matthew Hill)  Neurology  [facility], [address] [place] [address]  Phone: [phone]  Fax: [phone]  Follow Up Time:     Kristopher Guevara  RADIATION ONCOLOGY  [address] [place] [address] A [place] [address]  Phone: [phone]  Fax: [phone]    TEAM MEETING 2/24/22  SW:  moving in with daughter, family training needed  OT: Mod I for adls, SV for transfers and bathing -met goals  PT: SV for transfers, 100' amb no device with SV, 12 stairs with SV  SLP: MBS passed for regular with thins, delayed emptying may need GI as outpatient  barriers: cognition, comprehension, needs 24/7 supervision, impulsive   goals: Mod I for adls and transfers, SV for ambulation - met goals   EDOD: Home with 24/7 2/24     ASSESSMENT/PLAN  66 year old male with PMH of  HTN, DM2, and known brain mass. He was in the [place] for vacation in early January, when he was noted to be confused and not answering appropriately, so an MRI was done on Jan 7 showed L frontal enhancing mass with significant edema. He presented to [facility] on 1/24 for further evaluation and repeat CTH which  showed significant vasogenic edema and 1.5cm MLS. He underwent 1/27/22 for left sided craniotomy for tumor resection with gleolan. Post-op course was complicated by possible seizures with EEG negative, hyponatermia with salt tabs added to regimen. 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 He was admitted to [facility] but tested positive for covid. He was asymptomatic but trasnferred to medical floor for quarantine. His subsequent covid swabs came back negative. Patient was deemed stable to return to [facility]  on 2/11/22. Patient  with gait Instability, ADL impairments and Functional impairments.    * Stable for discharge  * reduce        #Left frontal tumor s/p craniotomy for resection with right sided drift, Gait Instability, ADL impairments and Functional impairments  - Monitor incision -staples removed 2/14 - tolerated well  -Vimpat 100mg BID for seizure prophylaxis  -Decadron slow taper- now on 2mg Q12h - to maintain until follow up with neurosurgery   -follow up with neuro-oncology     #Cough/hoarseness   - has Cepacol PRN  - Seen by ENT - has vocal cords mobile but with incomplete closure  - will follow up with Dr. Graham Bergeron [phone] 2 weeks after discharge.  - Encouraged tylenol use for discomfort.      #Depression  - Lexapro    #Hyponatremia  -salt tabs 1G Q8h - dec BID  - monitor by PCP outpatient  - Na 139 (2/24)    #HTN  -Home med: lisinopril 5mg daily  -BP controlled off Med (107/65 - 110/75) 2/24    #HLD   -atorvastatin 20mg daily    #Type 2 DM  - A1c is 10.7 (1/25)  -FS and ISS  - Lantus 16 units at bedtime  - Admelog 5 AC  - metformin 1000 BID (2/21)    #Pain control  - Tylenol PRN    #Covid 19 - false positive   - positive on 2/8- likely false positive  - Negative  2/9, 2/10, 2/18  - Continues to be asymptomatic    #GI/Bowel Mgmt   - Senna at bedtime   - Miralax BID    #DVT  - Lovenox  - SCDs  - TEDs     #Skin:  -Incisional wound care per nursing. Surgical staples removed, wound intact (closed)    FEN   - Diet -regular with thin liquids [CCHO, DASH/TLC]    -Passed MBS 2/16    - Dysphagia  SLP - evaluation and treatment    Outpatient Follow-up (Specialty/Name of physician):  Parker Gupta)  Neurosurgery  [address] [place] [address]  Phone: [phone]  Fax: [phone]  Follow Up Time:     Matthew Hill)  Neurology  [facility], [address] [place] [address]  Phone: [phone]  Fax: [phone]  Follow Up Time:     Kristopher Guevara  RADIATION ONCOLOGY  [address] [place] [address] RD Entrance A [place] [address]  Phone: [phone]  Fax: [phone]    TEAM MEETING 2/24/22  SW:  moving in with daughter, family training needed  OT: Mod I for adls, SV for transfers and bathing -met goals  PT: SV for transfers, 100' amb no device with SV, 12 stairs with SV  SLP: MBS passed for regular with thins, delayed emptying may need GI as outpatient  barriers: cognition, comprehension, needs 24/7 supervision, impulsive   goals: Mod I for adls and transfers, SV for ambulation - met goals   EDOD: Home with 24/7 2/24     ASSESSMENT/PLAN  66 year old male with PMH of  HTN, DM2, and known brain mass. He was in the [place] for vacation in early January, when he was noted to be confused and not answering appropriately, so an MRI was done on Jan 7 showed L frontal enhancing mass with significant edema. He presented to [facility] on 1/24 for further evaluation and repeat CTH which  showed significant vasogenic edema and 1.5cm MLS. He underwent 1/27/22 for left sided craniotomy for tumor resection with gleolan. Post-op course was complicated by possible seizures with EEG negative, hyponatermia with salt tabs added to regimen. 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 He was admitted to [facility] but tested positive for covid. He was asymptomatic but trasnferred to medical floor for quarantine. His subsequent covid swabs came back negative. Patient was deemed stable to return to [facility]  on 2/11/22. Patient  with gait Instability, ADL impairments and Functional impairments.    * Stable for discharge  *PCP and Neurosurgery to f/u monitoring of Na level and gradually d/c salt tabs        #Left frontal tumor s/p craniotomy for resection with right sided drift, Gait Instability, ADL impairments and Functional impairments  - Monitor incision -staples removed 2/14 - tolerated well  -Vimpat 100mg BID for seizure prophylaxis  -Decadron slow taper- now on 2mg Q12h - to maintain until follow up with neurosurgery   -follow up with neuro-oncology     #Cough/hoarseness   - has Cepacol PRN  - Seen by ENT - has vocal cords mobile but with incomplete closure  - will follow up with Dr. Graham Bergeron [phone] 2 weeks after discharge.  - Encouraged tylenol use for discomfort.      #Depression  - Lexapro    #Hyponatremia  -salt tabs 1G Q8h - dec BID  - monitor by PCP outpatient  - Na 139 (2/24)    #HTN  -Home med: lisinopril 5mg daily  -BP controlled off Med (107/65 - 110/75) 2/24    #HLD   -atorvastatin 20mg daily    #Type 2 DM  - A1c is 10.7 (1/25)  -FS and ISS  - Lantus 16 units at bedtime  - Admelog 5 AC  - metformin 1000 BID (2/21)    #Pain control  - Tylenol PRN    #Covid 19 - false positive   - positive on 2/8- likely false positive  - Negative  2/9, 2/10, 2/18  - Continues to be asymptomatic    #GI/Bowel Mgmt   - Senna at bedtime   - Miralax BID    #DVT  - Lovenox  - SCDs  - TEDs     #Skin:  -Incisional wound care per nursing. Surgical staples removed, wound intact (closed)    FEN   - Diet -regular with thin liquids [CCHO, DASH/TLC]    -Passed MBS 2/16    - Dysphagia  SLP - evaluation and treatment    Outpatient Follow-up (Specialty/Name of physician):  Parker Gupta)  Neurosurgery  [address] [place] [address]  Phone: [phone]  Fax: [phone]  Follow Up Time:     Matthew Hill)  Neurology  [facility], [address] [place] [address]  Phone: [phone]  Fax: [phone]  Follow Up Time:     Kristopher Guevara  RADIATION ONCOLOGY  [address] [place] [address] A [place] [address]  Phone: [phone]  Fax: [phone]    TEAM MEETING 2/24/22  SW:  moving in with daughter, family training needed  OT: Mod I for adls, SV for transfers and bathing -met goals  PT: SV for transfers, 100' amb no device with SV, 12 stairs with SV  SLP: MBS passed for regular with thins, delayed emptying may need GI as outpatient  barriers: cognition, comprehension, needs 24/7 supervision, impulsive   goals: Mod I for adls and transfers, SV for ambulation - met goals   EDOD: Home with 24/7 2/24

## 2022-02-28 ENCOUNTER — INPATIENT (INPATIENT)
Facility: HOSPITAL | Age: 67
LOS: 2 days | Discharge: HOME | End: 2022-03-03
Attending: INTERNAL MEDICINE | Admitting: INTERNAL MEDICINE
Payer: MEDICARE

## 2022-02-28 VITALS
HEIGHT: 67 IN | OXYGEN SATURATION: 98 % | DIASTOLIC BLOOD PRESSURE: 79 MMHG | SYSTOLIC BLOOD PRESSURE: 123 MMHG | HEART RATE: 90 BPM | TEMPERATURE: 98 F | RESPIRATION RATE: 16 BRPM

## 2022-02-28 DIAGNOSIS — Z98.890 OTHER SPECIFIED POSTPROCEDURAL STATES: Chronic | ICD-10-CM

## 2022-02-28 DIAGNOSIS — J38.4 EDEMA OF LARYNX: ICD-10-CM

## 2022-02-28 LAB
ALBUMIN SERPL ELPH-MCNC: 3.3 G/DL — SIGNIFICANT CHANGE UP (ref 3.3–5)
ALP SERPL-CCNC: 73 U/L — SIGNIFICANT CHANGE UP (ref 40–120)
ALT FLD-CCNC: 9 U/L — SIGNIFICANT CHANGE UP (ref 4–41)
ANION GAP SERPL CALC-SCNC: 12 MMOL/L — SIGNIFICANT CHANGE UP (ref 7–14)
AST SERPL-CCNC: 7 U/L — SIGNIFICANT CHANGE UP (ref 4–40)
BASOPHILS # BLD AUTO: 0.02 K/UL — SIGNIFICANT CHANGE UP (ref 0–0.2)
BASOPHILS NFR BLD AUTO: 0.2 % — SIGNIFICANT CHANGE UP (ref 0–2)
BILIRUB SERPL-MCNC: 0.4 MG/DL — SIGNIFICANT CHANGE UP (ref 0.2–1.2)
BUN SERPL-MCNC: 16 MG/DL — SIGNIFICANT CHANGE UP (ref 7–23)
CALCIUM SERPL-MCNC: 9 MG/DL — SIGNIFICANT CHANGE UP (ref 8.4–10.5)
CHLORIDE SERPL-SCNC: 98 MMOL/L — SIGNIFICANT CHANGE UP (ref 98–107)
CO2 SERPL-SCNC: 26 MMOL/L — SIGNIFICANT CHANGE UP (ref 22–31)
CREAT SERPL-MCNC: 0.6 MG/DL — SIGNIFICANT CHANGE UP (ref 0.5–1.3)
EGFR: 107 ML/MIN/1.73M2 — SIGNIFICANT CHANGE UP
EOSINOPHIL # BLD AUTO: 0.01 K/UL — SIGNIFICANT CHANGE UP (ref 0–0.5)
EOSINOPHIL NFR BLD AUTO: 0.1 % — SIGNIFICANT CHANGE UP (ref 0–6)
GLUCOSE SERPL-MCNC: 191 MG/DL — HIGH (ref 70–99)
HCT VFR BLD CALC: 34.9 % — LOW (ref 39–50)
HGB BLD-MCNC: 11.6 G/DL — LOW (ref 13–17)
IANC: 9.6 K/UL — HIGH (ref 1.5–8.5)
IMM GRANULOCYTES NFR BLD AUTO: 0.7 % — SIGNIFICANT CHANGE UP (ref 0–1.5)
LYMPHOCYTES # BLD AUTO: 0.9 K/UL — LOW (ref 1–3.3)
LYMPHOCYTES # BLD AUTO: 7.9 % — LOW (ref 13–44)
MCHC RBC-ENTMCNC: 31.2 PG — SIGNIFICANT CHANGE UP (ref 27–34)
MCHC RBC-ENTMCNC: 33.2 GM/DL — SIGNIFICANT CHANGE UP (ref 32–36)
MCV RBC AUTO: 93.8 FL — SIGNIFICANT CHANGE UP (ref 80–100)
MONOCYTES # BLD AUTO: 0.81 K/UL — SIGNIFICANT CHANGE UP (ref 0–0.9)
MONOCYTES NFR BLD AUTO: 7.1 % — SIGNIFICANT CHANGE UP (ref 2–14)
NEUTROPHILS # BLD AUTO: 9.6 K/UL — HIGH (ref 1.8–7.4)
NEUTROPHILS NFR BLD AUTO: 84 % — HIGH (ref 43–77)
NRBC # BLD: 0 /100 WBCS — SIGNIFICANT CHANGE UP
NRBC # FLD: 0 K/UL — SIGNIFICANT CHANGE UP
PLATELET # BLD AUTO: 269 K/UL — SIGNIFICANT CHANGE UP (ref 150–400)
POTASSIUM SERPL-MCNC: 3.9 MMOL/L — SIGNIFICANT CHANGE UP (ref 3.5–5.3)
POTASSIUM SERPL-SCNC: 3.9 MMOL/L — SIGNIFICANT CHANGE UP (ref 3.5–5.3)
PROT SERPL-MCNC: 6.9 G/DL — SIGNIFICANT CHANGE UP (ref 6–8.3)
RBC # BLD: 3.72 M/UL — LOW (ref 4.2–5.8)
RBC # FLD: 12.9 % — SIGNIFICANT CHANGE UP (ref 10.3–14.5)
SODIUM SERPL-SCNC: 136 MMOL/L — SIGNIFICANT CHANGE UP (ref 135–145)
TROPONIN T, HIGH SENSITIVITY RESULT: 7 NG/L — SIGNIFICANT CHANGE UP
WBC # BLD: 11.42 K/UL — HIGH (ref 3.8–10.5)
WBC # FLD AUTO: 11.42 K/UL — HIGH (ref 3.8–10.5)

## 2022-02-28 PROCEDURE — 71260 CT THORAX DX C+: CPT | Mod: 26,MA

## 2022-02-28 PROCEDURE — 70491 CT SOFT TISSUE NECK W/DYE: CPT | Mod: 26,MA

## 2022-02-28 PROCEDURE — 99285 EMERGENCY DEPT VISIT HI MDM: CPT

## 2022-02-28 RX ORDER — SODIUM CHLORIDE 9 MG/ML
1000 INJECTION, SOLUTION INTRAVENOUS
Refills: 0 | Status: DISCONTINUED | OUTPATIENT
Start: 2022-02-28 | End: 2022-03-03

## 2022-02-28 RX ORDER — DEXAMETHASONE 0.5 MG/5ML
10 ELIXIR ORAL ONCE
Refills: 0 | Status: COMPLETED | OUTPATIENT
Start: 2022-02-28 | End: 2022-02-28

## 2022-02-28 RX ORDER — AMPICILLIN SODIUM AND SULBACTAM SODIUM 250; 125 MG/ML; MG/ML
3 INJECTION, POWDER, FOR SUSPENSION INTRAMUSCULAR; INTRAVENOUS ONCE
Refills: 0 | Status: COMPLETED | OUTPATIENT
Start: 2022-02-28 | End: 2022-02-28

## 2022-02-28 RX ADMIN — Medication 102 MILLIGRAM(S): at 21:12

## 2022-02-28 RX ADMIN — SODIUM CHLORIDE 100 MILLILITER(S): 9 INJECTION, SOLUTION INTRAVENOUS at 23:07

## 2022-02-28 RX ADMIN — AMPICILLIN SODIUM AND SULBACTAM SODIUM 200 GRAM(S): 250; 125 INJECTION, POWDER, FOR SUSPENSION INTRAMUSCULAR; INTRAVENOUS at 21:36

## 2022-02-28 NOTE — ED ADULT TRIAGE NOTE - CHIEF COMPLAINT QUOTE
Sent from MD Kelsey for CT of neck/chest. Seen to have swelling of vocal cords. As per daughter, brain tumor removed on 1/21/22 (diagnosed with glioblastoma), discharged 3wks ago. Pt. c/o pain and hoarseness.

## 2022-02-28 NOTE — ED PROVIDER NOTE - CARE PLAN
1 Principal Discharge DX:	Vocal cord edema   Principal Discharge DX:	Bacterial tracheitis  Secondary Diagnosis:	Type 2 diabetes mellitus  Secondary Diagnosis:	Tracheal stenosis  Secondary Diagnosis:	Glioblastoma  Secondary Diagnosis:	Dysphagia

## 2022-02-28 NOTE — ED PROVIDER NOTE - PHYSICAL EXAMINATION
Gen: non toxic appearing, NAD   Head: NC/NT  Eyes:  anicteric  ENT: airway patent, mmm   CV: RRR, +S1/S2   Resp: CTAB, symmetric breath sounds   GI:  abdomen soft non-distended, NTTP  Extremities -  no edema  Neuro: A&Ox4

## 2022-02-28 NOTE — ED CLERICAL - NS ED CLERK NOTE PRE-ARRIVAL INFORMATION; ADDITIONAL PRE-ARRIVAL INFORMATION
pt sent from ent c/o soar throat pt seen by ent vocal cords not moving  has sub glottitis infection  ?

## 2022-02-28 NOTE — ED PROVIDER NOTE - ATTENDING CONTRIBUTION TO CARE
66 year old Male who has a past medical history of HTN, T2DM on Insulin, recently Dx with Grade 4 Gliosarcoma s/p left sided craniotomy with gleloan on 01/27/22 currently on Vimpat 100mg BID and PO decadron presenting from Dr Aguilar's office for "b/l vocal cord edema and mucous coming from trachea"   DATA:  LAB/Imaging  CT IMPRESSION: Mild soft tissue thickening of the glottis and subglottic larynx, and   severe soft tissue thickening of the upper trachea with severe narrowing   at the level of the thoracic inlet. Multiple locules of air in the   pretracheal/left paratracheal space with tiny amount of fluid, cannot   exclude tracheal injury/microperforation. No evidence for rim-enhancing   fluid collection to suggest abscess.    IMPRESSION/RISK:  Dx=? complicated tracheitis     Consideration include CTSx/ENT BSABx ivfs admit  Plan  as above

## 2022-02-28 NOTE — ED ADULT NURSE NOTE - OBJECTIVE STATEMENT
Sent from [facility] for CT of neck/chest. Seen to have swelling of vocal cords. As per daughter, brain tumor removed on 1/21/22 (diagnosed with glioblastoma), discharged 3wks ago. Pt. c/o pain and hoarseness.  Patient to room 11 with family at bedside. Pt is alert and oriented times four. Patient evaluated by MD. 20 gauge IV lock placed to left antecubital and labs drawn and sent. Pt comfortable and waiting for results, further orders and disposition.  JUMA Matos

## 2022-02-28 NOTE — ED PROVIDER NOTE - PROGRESS NOTE DETAILS
Gail PGY3: spoke to Dr. Kelsey ENT whom scoped the patient today. Noted b/l vocal cord edema and mucous coming from trachea, recommended antibotics and steroids and house ENT consult. ENT resident consulted, raul DOLL's.

## 2022-02-28 NOTE — CONSULT NOTE ADULT - ASSESSMENT
A/P: 67 yo M with hx of glioblastoma, htn, dm, hld sent in by outpt ENT Gera Kelsey for CT neck and chest given concern of worsening throat/upper chest pain. Worsening odynophagia, now tolerating puree food. No fevers, WBC 11.2. Scope exam with no significant supralaryngeal swelling, mild weakness of L vocal cord. CT w/ severe soft tissue thickening of the upper trachea with severe narrowing at the level of the thoracic inlet. Multiple locules of air in the pretracheal/left paratracheal space with tiny amount of fluid, cannot exclude tracheal injury/microperforation. No evidence for rim-enhancing fluid collection to suggest abscess.    - Airway widely patent - no significant supralaryngeal swelling  - C/w antibiotics per primary team  - Can consider two more doses of steroids  - CTS consult for thoracic inlet tracheal narrowing w/ locules of air in pre/paratracheal space  - SLP evaluation to r/o aspiration  - Pain control  - D/w attending

## 2022-02-28 NOTE — ED PROVIDER NOTE - OBJECTIVE STATEMENT
65 yo M with hx of glioblastoma, htn, dm, hld sent in by outpt ENT Gera Kelsey for CT neck and chest.     Pt recently had glioblastoma resection surgery and was discharged from the rehab 4ds ago. Had NGtube during his stay that removed before he was discharged. Since the removal he has been experiencing throat pain and upper chest pain. Seen by ENT outpt and was noted to have vocal cord swelling and or irritation and sent in for CT. Having difficulty swallowing. Eating pureed food and still having difficulty swallowing due to pain. No shortness of breath. No fever, chills, abd pain, n/v, headache, dizziness, change in vision.

## 2022-03-01 ENCOUNTER — TRANSCRIPTION ENCOUNTER (OUTPATIENT)
Age: 67
End: 2022-03-01

## 2022-03-01 DIAGNOSIS — S19.82XA: ICD-10-CM

## 2022-03-01 DIAGNOSIS — J39.8 OTHER SPECIFIED DISEASES OF UPPER RESPIRATORY TRACT: ICD-10-CM

## 2022-03-01 DIAGNOSIS — C71.9 MALIGNANT NEOPLASM OF BRAIN, UNSPECIFIED: ICD-10-CM

## 2022-03-01 DIAGNOSIS — R13.10 DYSPHAGIA, UNSPECIFIED: ICD-10-CM

## 2022-03-01 DIAGNOSIS — I10 ESSENTIAL (PRIMARY) HYPERTENSION: ICD-10-CM

## 2022-03-01 DIAGNOSIS — R65.10 SYSTEMIC INFLAMMATORY RESPONSE SYNDROME (SIRS) OF NON-INFECTIOUS ORIGIN WITHOUT ACUTE ORGAN DYSFUNCTION: ICD-10-CM

## 2022-03-01 DIAGNOSIS — Z29.9 ENCOUNTER FOR PROPHYLACTIC MEASURES, UNSPECIFIED: ICD-10-CM

## 2022-03-01 DIAGNOSIS — E11.9 TYPE 2 DIABETES MELLITUS WITHOUT COMPLICATIONS: ICD-10-CM

## 2022-03-01 LAB
A1C WITH ESTIMATED AVERAGE GLUCOSE RESULT: 7.3 % — HIGH (ref 4–5.6)
ANION GAP SERPL CALC-SCNC: 13 MMOL/L — SIGNIFICANT CHANGE UP (ref 7–14)
APTT BLD: 24.9 SEC — LOW (ref 27–36.3)
BLD GP AB SCN SERPL QL: NEGATIVE — SIGNIFICANT CHANGE UP
BLOOD GAS VENOUS COMPREHENSIVE RESULT: SIGNIFICANT CHANGE UP
BUN SERPL-MCNC: 13 MG/DL — SIGNIFICANT CHANGE UP (ref 7–23)
CALCIUM SERPL-MCNC: 9.2 MG/DL — SIGNIFICANT CHANGE UP (ref 8.4–10.5)
CHLORIDE SERPL-SCNC: 95 MMOL/L — LOW (ref 98–107)
CO2 SERPL-SCNC: 25 MMOL/L — SIGNIFICANT CHANGE UP (ref 22–31)
CREAT SERPL-MCNC: 0.61 MG/DL — SIGNIFICANT CHANGE UP (ref 0.5–1.3)
EGFR: 106 ML/MIN/1.73M2 — SIGNIFICANT CHANGE UP
ESTIMATED AVERAGE GLUCOSE: 163 — SIGNIFICANT CHANGE UP
GLUCOSE BLDC GLUCOMTR-MCNC: 114 MG/DL — HIGH (ref 70–99)
GLUCOSE BLDC GLUCOMTR-MCNC: 177 MG/DL — HIGH (ref 70–99)
GLUCOSE BLDC GLUCOMTR-MCNC: 193 MG/DL — HIGH (ref 70–99)
GLUCOSE BLDC GLUCOMTR-MCNC: 207 MG/DL — HIGH (ref 70–99)
GLUCOSE BLDC GLUCOMTR-MCNC: 223 MG/DL — HIGH (ref 70–99)
GLUCOSE BLDC GLUCOMTR-MCNC: 244 MG/DL — HIGH (ref 70–99)
GLUCOSE SERPL-MCNC: 243 MG/DL — HIGH (ref 70–99)
HCT VFR BLD CALC: 37.9 % — LOW (ref 39–50)
HGB BLD-MCNC: 12.5 G/DL — LOW (ref 13–17)
INR BLD: 1.32 RATIO — HIGH (ref 0.88–1.16)
MAGNESIUM SERPL-MCNC: 1.6 MG/DL — SIGNIFICANT CHANGE UP (ref 1.6–2.6)
MCHC RBC-ENTMCNC: 31.3 PG — SIGNIFICANT CHANGE UP (ref 27–34)
MCHC RBC-ENTMCNC: 33 GM/DL — SIGNIFICANT CHANGE UP (ref 32–36)
MCV RBC AUTO: 94.8 FL — SIGNIFICANT CHANGE UP (ref 80–100)
NRBC # BLD: 0 /100 WBCS — SIGNIFICANT CHANGE UP
NRBC # FLD: 0 K/UL — SIGNIFICANT CHANGE UP
PHOSPHATE SERPL-MCNC: 3.7 MG/DL — SIGNIFICANT CHANGE UP (ref 2.5–4.5)
PLATELET # BLD AUTO: 287 K/UL — SIGNIFICANT CHANGE UP (ref 150–400)
POTASSIUM SERPL-MCNC: 4.2 MMOL/L — SIGNIFICANT CHANGE UP (ref 3.5–5.3)
POTASSIUM SERPL-SCNC: 4.2 MMOL/L — SIGNIFICANT CHANGE UP (ref 3.5–5.3)
PROTHROM AB SERPL-ACNC: 15.4 SEC — HIGH (ref 10.5–13.4)
RBC # BLD: 4 M/UL — LOW (ref 4.2–5.8)
RBC # FLD: 13 % — SIGNIFICANT CHANGE UP (ref 10.3–14.5)
RH IG SCN BLD-IMP: POSITIVE — SIGNIFICANT CHANGE UP
SARS-COV-2 RNA SPEC QL NAA+PROBE: SIGNIFICANT CHANGE UP
SARS-COV-2 RNA SPEC QL NAA+PROBE: SIGNIFICANT CHANGE UP
SODIUM SERPL-SCNC: 133 MMOL/L — LOW (ref 135–145)
TROPONIN T, HIGH SENSITIVITY RESULT: 7 NG/L — SIGNIFICANT CHANGE UP
WBC # BLD: 10.69 K/UL — HIGH (ref 3.8–10.5)
WBC # FLD AUTO: 10.69 K/UL — HIGH (ref 3.8–10.5)

## 2022-03-01 PROCEDURE — 99223 1ST HOSP IP/OBS HIGH 75: CPT

## 2022-03-01 PROCEDURE — 99222 1ST HOSP IP/OBS MODERATE 55: CPT

## 2022-03-01 PROCEDURE — 12345: CPT | Mod: NC

## 2022-03-01 RX ORDER — ACETAMINOPHEN 500 MG
650 TABLET ORAL ONCE
Refills: 0 | Status: COMPLETED | OUTPATIENT
Start: 2022-03-01 | End: 2022-03-01

## 2022-03-01 RX ORDER — PANTOPRAZOLE SODIUM 20 MG/1
40 TABLET, DELAYED RELEASE ORAL DAILY
Refills: 0 | Status: DISCONTINUED | OUTPATIENT
Start: 2022-03-01 | End: 2022-03-03

## 2022-03-01 RX ORDER — FLUTICASONE PROPIONATE 50 MCG
1 SPRAY, SUSPENSION NASAL
Refills: 0 | Status: DISCONTINUED | OUTPATIENT
Start: 2022-03-01 | End: 2022-03-03

## 2022-03-01 RX ORDER — AMPICILLIN SODIUM AND SULBACTAM SODIUM 250; 125 MG/ML; MG/ML
3 INJECTION, POWDER, FOR SUSPENSION INTRAMUSCULAR; INTRAVENOUS EVERY 6 HOURS
Refills: 0 | Status: DISCONTINUED | OUTPATIENT
Start: 2022-03-01 | End: 2022-03-03

## 2022-03-01 RX ORDER — GLUCAGON INJECTION, SOLUTION 0.5 MG/.1ML
1 INJECTION, SOLUTION SUBCUTANEOUS ONCE
Refills: 0 | Status: DISCONTINUED | OUTPATIENT
Start: 2022-03-01 | End: 2022-03-03

## 2022-03-01 RX ORDER — INSULIN LISPRO 100/ML
VIAL (ML) SUBCUTANEOUS EVERY 6 HOURS
Refills: 0 | Status: DISCONTINUED | OUTPATIENT
Start: 2022-03-01 | End: 2022-03-01

## 2022-03-01 RX ORDER — LACOSAMIDE 50 MG/1
100 TABLET ORAL EVERY 12 HOURS
Refills: 0 | Status: DISCONTINUED | OUTPATIENT
Start: 2022-03-01 | End: 2022-03-03

## 2022-03-01 RX ORDER — INSULIN GLARGINE 100 [IU]/ML
10 INJECTION, SOLUTION SUBCUTANEOUS AT BEDTIME
Refills: 0 | Status: COMPLETED | OUTPATIENT
Start: 2022-03-01 | End: 2022-03-01

## 2022-03-01 RX ORDER — INSULIN GLARGINE 100 [IU]/ML
8 INJECTION, SOLUTION SUBCUTANEOUS ONCE
Refills: 0 | Status: COMPLETED | OUTPATIENT
Start: 2022-03-01 | End: 2022-03-01

## 2022-03-01 RX ORDER — DEXTROSE 50 % IN WATER 50 %
15 SYRINGE (ML) INTRAVENOUS ONCE
Refills: 0 | Status: DISCONTINUED | OUTPATIENT
Start: 2022-03-01 | End: 2022-03-03

## 2022-03-01 RX ORDER — SODIUM CHLORIDE 9 MG/ML
1000 INJECTION, SOLUTION INTRAVENOUS
Refills: 0 | Status: DISCONTINUED | OUTPATIENT
Start: 2022-03-01 | End: 2022-03-03

## 2022-03-01 RX ORDER — DEXTROSE 50 % IN WATER 50 %
25 SYRINGE (ML) INTRAVENOUS ONCE
Refills: 0 | Status: DISCONTINUED | OUTPATIENT
Start: 2022-03-01 | End: 2022-03-03

## 2022-03-01 RX ORDER — ENOXAPARIN SODIUM 100 MG/ML
40 INJECTION SUBCUTANEOUS DAILY
Refills: 0 | Status: DISCONTINUED | OUTPATIENT
Start: 2022-03-01 | End: 2022-03-01

## 2022-03-01 RX ORDER — DEXAMETHASONE 0.5 MG/5ML
6 ELIXIR ORAL DAILY
Refills: 0 | Status: COMPLETED | OUTPATIENT
Start: 2022-03-01 | End: 2022-03-02

## 2022-03-01 RX ORDER — DEXTROSE 50 % IN WATER 50 %
12.5 SYRINGE (ML) INTRAVENOUS ONCE
Refills: 0 | Status: DISCONTINUED | OUTPATIENT
Start: 2022-03-01 | End: 2022-03-03

## 2022-03-01 RX ORDER — LANOLIN ALCOHOL/MO/W.PET/CERES
3 CREAM (GRAM) TOPICAL AT BEDTIME
Refills: 0 | Status: DISCONTINUED | OUTPATIENT
Start: 2022-03-01 | End: 2022-03-03

## 2022-03-01 RX ORDER — INSULIN LISPRO 100/ML
VIAL (ML) SUBCUTANEOUS EVERY 6 HOURS
Refills: 0 | Status: DISCONTINUED | OUTPATIENT
Start: 2022-03-01 | End: 2022-03-02

## 2022-03-01 RX ORDER — INFLUENZA VIRUS VACCINE 15; 15; 15; 15 UG/.5ML; UG/.5ML; UG/.5ML; UG/.5ML
0.7 SUSPENSION INTRAMUSCULAR ONCE
Refills: 0 | Status: DISCONTINUED | OUTPATIENT
Start: 2022-03-01 | End: 2022-03-03

## 2022-03-01 RX ADMIN — Medication 650 MILLIGRAM(S): at 21:04

## 2022-03-01 RX ADMIN — Medication 650 MILLIGRAM(S): at 22:04

## 2022-03-01 RX ADMIN — LACOSAMIDE 120 MILLIGRAM(S): 50 TABLET ORAL at 05:31

## 2022-03-01 RX ADMIN — INSULIN GLARGINE 10 UNIT(S): 100 INJECTION, SOLUTION SUBCUTANEOUS at 22:27

## 2022-03-01 RX ADMIN — INSULIN GLARGINE 8 UNIT(S): 100 INJECTION, SOLUTION SUBCUTANEOUS at 05:30

## 2022-03-01 RX ADMIN — Medication 4: at 13:02

## 2022-03-01 RX ADMIN — PANTOPRAZOLE SODIUM 40 MILLIGRAM(S): 20 TABLET, DELAYED RELEASE ORAL at 13:01

## 2022-03-01 RX ADMIN — Medication 6 MILLIGRAM(S): at 21:04

## 2022-03-01 RX ADMIN — ENOXAPARIN SODIUM 40 MILLIGRAM(S): 100 INJECTION SUBCUTANEOUS at 13:01

## 2022-03-01 RX ADMIN — Medication 2: at 05:30

## 2022-03-01 RX ADMIN — AMPICILLIN SODIUM AND SULBACTAM SODIUM 200 GRAM(S): 250; 125 INJECTION, POWDER, FOR SUSPENSION INTRAMUSCULAR; INTRAVENOUS at 19:25

## 2022-03-01 RX ADMIN — Medication 3 MILLIGRAM(S): at 22:28

## 2022-03-01 RX ADMIN — LACOSAMIDE 120 MILLIGRAM(S): 50 TABLET ORAL at 18:35

## 2022-03-01 RX ADMIN — AMPICILLIN SODIUM AND SULBACTAM SODIUM 200 GRAM(S): 250; 125 INJECTION, POWDER, FOR SUSPENSION INTRAMUSCULAR; INTRAVENOUS at 06:35

## 2022-03-01 RX ADMIN — Medication 1 SPRAY(S): at 18:35

## 2022-03-01 RX ADMIN — AMPICILLIN SODIUM AND SULBACTAM SODIUM 200 GRAM(S): 250; 125 INJECTION, POWDER, FOR SUSPENSION INTRAMUSCULAR; INTRAVENOUS at 13:02

## 2022-03-01 NOTE — H&P ADULT - PROBLEM SELECTOR PLAN 1
- CT neck showing Mild soft tissue thickening of the glottis and subglottic larynx, and severe soft tissue thickening of the upper trachea with severe narrowing at the level of the thoracic inlet. Multiple locules of air in the pretracheal/left paratracheal space with tiny amount of fluid, cannot exclude tracheal injury/microperforation. No evidence for rim-enhancing fluid collection to suggest abscess.  - CT chest Circumferential thickening of the trachea with resultant severe narrowing at the level of thoracic inlet. Exact etiology is unclear, however, primary consideration is post intubation stenosis.  - ENT following  - pt is s/p laryngoscope by ENT - Airway widely patent - no significant supralaryngeal swelling  - monitor Sp02   - s/p IV Decadron 10mg x 1 and IV Unasyn   - pt is on PO decadron outpatient  - will continue IV Decadron x 2 more doses  - Send blood cx x 2 sets  - Continue IV Abx  - CT showing thoracic inlet tracheal narrowing w/ locules of air in pre/paratracheal space --> CT surgery c/s in AM - CT neck showing Mild soft tissue thickening of the glottis and subglottic larynx, and severe soft tissue thickening of the upper trachea with severe narrowing at the level of the thoracic inlet. Multiple locules of air in the pretracheal/left paratracheal space with tiny amount of fluid, cannot exclude tracheal injury/microperforation. No evidence for rim-enhancing fluid collection to suggest abscess.  - CT chest Circumferential thickening of the trachea with resultant severe narrowing at the level of thoracic inlet. Exact etiology is unclear, however, primary consideration is post intubation stenosis.  - ENT following - pt is s/p laryngoscope by ENT - Airway widely patent - no significant supralaryngeal swelling  - monitor Sp02   - s/p IV Decadron 10mg x 1 and IV Unasyn 3g x 1 in ED   - pt is on PO decadron 2mg outpatient  - will continue IV Decadron x 2 more doses  - Send blood cx x 2 sets  - Continue IV Unasyn 3g Q6 hours  - CT showing thoracic inlet tracheal narrowing w/ locules of air in pre/paratracheal space --> CT surgery c/s in AM - CT neck showing Mild soft tissue thickening of the glottis and subglottic larynx, and severe soft tissue thickening of the upper trachea with severe narrowing at the level of the thoracic inlet. Multiple locules of air in the pretracheal/left paratracheal space with tiny amount of fluid, cannot exclude tracheal injury/microperforation. No evidence for rim-enhancing fluid collection to suggest abscess.  - CT chest Circumferential thickening of the trachea with resultant severe narrowing at the level of thoracic inlet. Exact etiology is unclear, however, primary consideration is post intubation stenosis.  - ENT following - pt is s/p laryngoscope by ENT - Airway widely patent - no significant supralaryngeal swelling  - monitor Sp02 patient is on RA   - s/p IV Decadron 10mg x 1 and IV Unasyn 3g x 1 in ED   - will continue IV Decadron 6mg x 2 more doses  - Send blood cx x 2 sets  - Continue IV Unasyn 3g Q6 hours  - CT showing thoracic inlet tracheal narrowing w/ locules of air in pre/paratracheal space --> CT surgery c/s in AM

## 2022-03-01 NOTE — PROGRESS NOTE ADULT - SUBJECTIVE AND OBJECTIVE BOX
[facility] Division of Hospital Medicine  Nehemiah Choudhury) MD Deon  Pager [phone]    SUBJECTIVE:  Chief complaint: tracheal stenosis.    Pt seen and evaluated at bedside this morning. No o/n events. Denies any SOB/CP/NV. Still reports burning at the throat region. Passed SLP eval.     ROS: All systems negative except as noted.      Vital Signs Last 24 Hrs  T(C): 36.3 (01 Mar 2022 13:27), Max: 36.8 (01 Mar 2022 12:33)  T(F): 97.3 (01 Mar 2022 13:27), Max: 98.2 (01 Mar 2022 12:33)  HR: 71 (01 Mar 2022 13:27) (71 - 79)  BP: 114/75 (01 Mar 2022 13:27) (105/73 - 132/94)  BP(mean): --  RR: 17 (01 Mar 2022 13:27) (17 - 20)  SpO2: 97% (01 Mar 2022 13:27) (94% - 98%)      PHYSICAL EXAM:  Gen- In bed, comfortable, NAD  Resp- CTAB, good effort. No r/r/w. No accessory muscle use.  CVS- RRR, S1S2, no g/r/m. No LE edema.  GI- Soft abd, NT, ND, +BSx4. No HSM.  MSK- No C/C. ROM intact. No crepitus.    MEDICATION:  MEDICATIONS  (STANDING):  ampicillin/sulbactam  IVPB 3 Gram(s) IV Intermittent every 6 hours  dexAMETHasone  Injectable 6 milliGRAM(s) IV Push daily  dextrose 40% Gel 15 Gram(s) Oral once  dextrose 5%. 1000 milliLiter(s) (50 mL/Hr) IV Continuous <Continuous>  dextrose 5%. 1000 milliLiter(s) (100 mL/Hr) IV Continuous <Continuous>  dextrose 50% Injectable 25 Gram(s) IV Push once  dextrose 50% Injectable 12.5 Gram(s) IV Push once  dextrose 50% Injectable 25 Gram(s) IV Push once  enoxaparin Injectable 40 milliGRAM(s) SubCutaneous daily  fluticasone propionate 50 MICROgram(s)/spray Nasal Spray 1 Spray(s) Both Nostrils two times a day  glucagon  Injectable 1 milliGRAM(s) IntraMuscular once  insulin glargine Injectable (LANTUS) 10 Unit(s) SubCutaneous at bedtime  insulin lispro (ADMELOG) corrective regimen sliding scale   SubCutaneous every 6 hours  lacosamide IVPB 100 milliGRAM(s) IV Intermittent every 12 hours  lactated ringers. 1000 milliLiter(s) (100 mL/Hr) IV Continuous <Continuous>  pantoprazole  Injectable 40 milliGRAM(s) IV Push daily    MEDICATIONS  (PRN):            LABORATORY:                          12.5   10.69 )-----------( 287      ( 01 Mar 2022 04:40 )             37.9     03-01    133<L>  |  95<L>  |  13  ----------------------------<  243<H>  4.2   |  25  |  0.61    Ca    9.2      01 Mar 2022 04:40  Phos  3.7     03-01  Mg     1.60     03-01    TPro  6.9  /  Alb  3.3  /  TBili  0.4  /  DBili  x   /  AST  7   /  ALT  9   /  AlkPhos  73  02-28    PT/INR - ( 01 Mar 2022 04:40 )   PT: 15.4 sec;   INR: 1.32 ratio         PTT - ( 01 Mar 2022 04:40 )  PTT:24.9 sec          COVID-19 PCR: NotDetec (28 Feb 2022 21:32)  COVID-19 PCR: NotDetec (24 Feb 2022 05:30)  COVID-19 PCR: NotDetec (18 Feb 2022 06:50)  COVID-19 PCR: NotDetec (11 Feb 2022 22:10)  COVID-19 PCR: NotDetec (10 Feb 2022 11:57)  COVID-19 PCR: NotDetec (09 Feb 2022 10:20)  COVID-19 PCR: Detected (08 Feb 2022 22:50)  COVID-19 PCR: NotDetec (06 Feb 2022 13:16)  COVID-19 PCR: NotDetec (04 Feb 2022 12:20)  COVID-19 PCR: NotDetec (26 Jan 2022 22:36)  COVID-19 PCR: NotDetec (24 Jan 2022 19:40)

## 2022-03-01 NOTE — H&P ADULT - PROBLEM SELECTOR PLAN 2
- pt reports pain and difficulty swallowing   - CT showing Retained debris/secretions are seen along the right mid to lower trachea  - Diet NPO  - aspiration precautions  - keep head of bed elevated  - S+S ordered

## 2022-03-01 NOTE — CONSULT NOTE ADULT - ATTENDING COMMENTS
narrowing just below vc, approx. 3 cm in length.   plan for eval tomorrow with flex bronch, possible dilation/fulguration  possible stent. patient previously intubated.

## 2022-03-01 NOTE — CONSULT NOTE ADULT - SUBJECTIVE AND OBJECTIVE BOX
ENT CONSULT NOTE    HPI: 65 yo M with hx of glioblastoma, htn, dm, hld sent in by outpt ENT Gera Kelsey for CT neck and chest.     	Pt recently had glioblastoma resection surgery and was discharged from the rehab 4ds ago. Had NGtube during his stay that removed before he was discharged. Since the removal he has been experiencing throat pain and upper chest pain. Seen by ENT outpt and was noted to have vocal cord swelling and or irritation and sent in for CT. Having difficulty swallowing. Eating pureed food and still having difficulty swallowing due to pain. No shortness of breath. No fever, chills, abd pain, n/v, headache, dizziness, change in vision.    Pt said symptoms have been worsening over the last few weeks. More difficulty breathing but no significant respiratory events. Pt says voice also sounds different (breathy during exam). He says he feels pain at sternal notch area. Denies recent fevers, but has had more coughing events recently with sputum.    PAST MEDICAL & SURGICAL HISTORY:  Diabetes mellitus    Hypertension    Glioblastoma  just diagnosed - 1/22/22    S/P craniotomy      No Known Allergies    MEDICATIONS  (STANDING):  lactated ringers. 1000 milliLiter(s) (100 mL/Hr) IV Continuous <Continuous>    MEDICATIONS  (PRN):      Objective    ICU Vital Signs Last 24 Hrs  T(C): 36.7 (28 Feb 2022 19:30), Max: 36.7 (28 Feb 2022 19:30)  T(F): 98.1 (28 Feb 2022 19:30), Max: 98.1 (28 Feb 2022 19:30)  HR: 78 (28 Feb 2022 19:30) (78 - 90)  BP: 132/94 (28 Feb 2022 19:30) (123/79 - 132/94)  BP(mean): --  ABP: --  ABP(mean): --  RR: 18 (28 Feb 2022 19:30) (16 - 18)  SpO2: 98% (28 Feb 2022 19:30) (98% - 98%)      PHYSICAL EXAM:    CONSTITUTIONAL: Well nourished, well developed, NON-DYSMORPHIC, and in no acute distress.    HEAD: normocephalic, atraumatic.  NOSE: Normal external nose. Anterior nasal cavity patent with no obstruction. Inferior turbinates normally sized.  ORAL CAVITY/OROPHARYNX: normal mucosa. No erythema, lesions or bleeding.  NECK: No cervical lymphadenopathy TTP near the sternal notch. No fluctuance, skin changes or swelling  RESPIRATORY: Respirations unlabored, no increased work of breathing with use of accessory muscles and retractions. No stridor.  CARDIAC: Warm extremities, no cyanosis.       Procedure: Flexible laryngoscopy    Pre-procedure diagnosis/Indication for procedure: To evaluate larynx    Nasopharynx wnl  BOT/vallecula normal  Epiglottis sharp  AE folds nonedematous  L VF mildly paretic, R VF with normal movement  No masses or lesions visualized in post cricoid space or pyriform sinuses bilaterally  No active bleeding or significant obstruction noted in supraglottic area.                           11.6   11.42 )-----------( 269      ( 28 Feb 2022 18:14 )             34.9     02-28    136  |  98  |  16  ----------------------------<  191<H>  3.9   |  26  |  0.60    Ca    9.0      28 Feb 2022 18:13    TPro  6.9  /  Alb  3.3  /  TBili  0.4  /  DBili  x   /  AST  7   /  ALT  9   /  AlkPhos  73  02-28      I&O's Summary    
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 As per ED notes patient was seen by outpatient ENT - Dr. Gera Kelsey who scoped patient today. Patient reports since his surgery he had hoarseness. He complains of throat pain and difficulty with swallowing. States he was on pureed diet home but complains of productive cough with yellow sputum. . Patient denies fever, chills, chest pain, dyspnea, abdominal pain, nausea, vomiting, melena, hematochezia      CT neck/chest - Mild soft tissue thickening of the glottis and subglottic larynx, and severe soft tissue thickening of the upper trachea with severe narrowing at the level of the thoracic inlet. Multiple locules of air in the pretracheal/left paratracheal space with tiny amount of fluid, cannot exclude tracheal injury/microperforation.   Patient is s/p IV Decadron and IV Unasyn. 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  Type 2 diabetes mellitus  S/P craniotomy                 Review of Systems:  · Negative General Symptoms	no fever; no chills  · Negative Skin Symptoms	no rash; no itching  · Negative Ophthalmologic Symptoms	no blurred vision L; no blurred vision R; no loss of vision L; no loss of vision R  · ENMT Comments	+ dysphagia + odynophagia +dysphonia  · Negative Respiratory and Thorax Symptoms	no wheezing; no dyspnea  · Respiratory and Thorax Comments	+ productive cough w/ yellow sputum  · Negative Cardiovascular Symptoms	no chest pain; no palpitations  · Negative Gastrointestinal Symptoms	no nausea; no vomiting; no abdominal pain; no melena; no hematochezia  · Negative General Genitourinary Symptoms	no hematuria; no flank pain L; no flank pain R; no dysuria  · Negative Musculoskeletal Symptoms	no neck pain; no arm pain L; no arm pain R; no back pain; no leg pain L; no leg pain R  · Negative Neurological Symptoms	no syncope; no loss of consciousness  · Negative Psychiatric Symptoms	no mood swings; no agitation  · Negative Hematology Symptoms	no gum bleeding; no nose bleeding                MEDICATIONS  (STANDING):  ampicillin/sulbactam  IVPB 3 Gram(s) IV Intermittent every 6 hours  dexAMETHasone  Injectable 6 milliGRAM(s) IV Push daily  dextrose 40% Gel 15 Gram(s) Oral once  dextrose 5%. 1000 milliLiter(s) (50 mL/Hr) IV Continuous <Continuous>  dextrose 5%. 1000 milliLiter(s) (100 mL/Hr) IV Continuous <Continuous>  dextrose 50% Injectable 25 Gram(s) IV Push once  dextrose 50% Injectable 12.5 Gram(s) IV Push once  dextrose 50% Injectable 25 Gram(s) IV Push once  enoxaparin Injectable 40 milliGRAM(s) SubCutaneous daily  fluticasone propionate 50 MICROgram(s)/spray Nasal Spray 1 Spray(s) Both Nostrils two times a day  glucagon  Injectable 1 milliGRAM(s) IntraMuscular once  insulin lispro (ADMELOG) corrective regimen sliding scale   SubCutaneous every 6 hours  lacosamide IVPB 100 milliGRAM(s) IV Intermittent every 12 hours  lactated ringers. 1000 milliLiter(s) (100 mL/Hr) IV Continuous <Continuous>  pantoprazole  Injectable 40 milliGRAM(s) IV Push daily    MEDICATIONS  (PRN):      Allergies  No Known Allergies  Intolerances        SOCIAL HISTORY:  Denies toxic habits            FAMILY HISTORY:  No pertinent family history in first degree relatives  unless noted, no significant family hx with Mother, Father, Siblings          Vital Signs Last 24 Hrs  T(C): 36.8 (01 Mar 2022 12:33), Max: 36.8 (01 Mar 2022 12:33)  T(F): 98.2 (01 Mar 2022 12:33), Max: 98.2 (01 Mar 2022 12:33)  HR: 73 (01 Mar 2022 12:33) (71 - 90)  BP: 106/62 (01 Mar 2022 12:33) (105/73 - 132/94)  BP(mean): --  RR: 18 (01 Mar 2022 12:33) (16 - 20)  SpO2: 94% (01 Mar 2022 12:33) (94% - 98%)        PHYSICAL EXAM:  General: Awake and alert.  No acute distress. + Dysphonia when speaking   Head: Normocephalic, atraumatic.  S/p craniotomy incision healing   Eyes: PERRL.  EOMI.  No scleral icterus.  No conjunctival pallor.  Mouth: Moist MM.  No oropharyngeal exudates.    Neck: Supple.  Full range of motion.  No JVD.    Heart: RRR.  Normal S1 and S2.  No murmurs, rubs, or gallops.   Lungs: Nonlabored breathing.  Good inspiratory effort.  CTAB.  No wheezes, crackles, or rhonchi.    Abdomen: BS+, soft, NT/ND.  No hepatomegaly.   Skin: Warm and dry.  No rashes.  Extremities: No cyanosis.  2+ peripheral pulses b/l. No LE edema   Musculoskeletal: No joint deformities.  No spinal or paraspinal tenderness.  Neuro: A&Ox3.  CN II-XII intact.  5/5 motor strength in UE and LE b/l.  Tactile sensation intact in UE and LE b/l.  Cerebellar function intact as assessed by finger-to-nose test.            LABS:                        12.5   10.69 )-----------( 287      ( 01 Mar 2022 04:40 )             37.9     03-01    133<L>  |  95<L>  |  13  ----------------------------<  243<H>  4.2   |  25  |  0.61    Ca    9.2      01 Mar 2022 04:40  Phos  3.7     03-01  Mg     1.60     03-01    TPro  6.9  /  Alb  3.3  /  TBili  0.4  /  DBili  x   /  AST  7   /  ALT  9   /  AlkPhos  73  02-28    PT/INR - ( 01 Mar 2022 04:40 )   PT: 15.4 sec;   INR: 1.32 ratio         PTT - ( 01 Mar 2022 04:40 )  PTT:24.9 sec      RADIOLOGY & ADDITIONAL STUDIES:    ASSESSMENT:   67yMalePAST MEDICAL & SURGICAL HISTORY:  Hypertension    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

## 2022-03-01 NOTE — H&P ADULT - ASSESSMENT
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

## 2022-03-01 NOTE — H&P ADULT - PROBLEM SELECTOR PLAN 4
- 02/10 A1c 8.7  - pt is on Lantus 16U qhs and premeal insulin  - currently diet is NPO  - will give half basal insulin - Lantus 8U x 1 ordered  - ISS   - monitor FS closely while on steroids

## 2022-03-01 NOTE — H&P ADULT - PROBLEM SELECTOR PLAN 3
- recently dx Stage 4 Glioblastoma s/p craniotomy   - Continue Vimpat 100mg BID for seizure prophylaxis  -Continue Decadron slow taper- now on 2mg Q12h   - follow up with neurosurgery - recently dx Stage 4 Glioblastoma s/p craniotomy   - On PO Vimpat 100mg BID for seizure prophylaxis  - s/p IV Decadron 10mg in ER  - Will continue IV Decadron 6mg x 2 days  - then start steroid taper   - follow up with neurosurgery

## 2022-03-01 NOTE — H&P ADULT - HISTORY OF PRESENT ILLNESS
66 year old Male PMHx HTN, T2DM on Insulin, Glioblastoma s/p left sided craniotomy with gleanthony on 01/27/22 found to have Grade 4 Gliosarcoma sent in from outpatient ENT office for b/l vocal cord edema and mucuous coming from trachea. Patient reports since his surgery he had hoarseness. He reports pain and difficuly with swallowing. States he was on pureed diet home. Patient complains of productive cough with yellow sputum. After craniotomy patient was recommended for acute inpatient rehab at [facility] but was recently admitted on 02/11/22 - 02/24/22 because he was found to be + COVID and could not be discharged to rehab. Patient tolerated course of inpatient pt/ot/slp rehab with significant functional improvements and met rehab goals prior and discharged home. Patient denies fever, chills, chest pain, dyspnea, abdominal pain, nausea, vomiting, melena, hematochezia    In ED vitals Temp 98F HR 71 Bp 129/79 Sp02 96% RA. Labs: WBC 11.42 H/H 11.6/34.9 Serum glucose 191   CT neck/chest - Mild soft tissue thickening of the glottis and subglottic larynx, and severe soft tissue thickening of the upper trachea with severe narrowing at the level of the thoracic inlet. Multiple locules of air in the pretracheal/left paratracheal space with tiny amount of fluid, cannot exclude tracheal injury/microperforation.   Patient is s/p IV Decadron and IV Unasyn. 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 As per ED notes patient was seen by outpatient ENT - Dr. Gera Kelsey who scoped patient today. Patient reports since his surgery he had hoarseness. He complains of throat pain and difficulty with swallowing. States he was on pureed diet home but complains of productive cough with yellow sputum. . Patient denies fever, chills, chest pain, dyspnea, abdominal pain, nausea, vomiting, melena, hematochezia    In ED vitals Temp 98F HR 71 Bp 129/79 Sp02 96% RA. Labs: WBC 11.42 H/H 11.6/34.9 Serum glucose 191   CT neck/chest - Mild soft tissue thickening of the glottis and subglottic larynx, and severe soft tissue thickening of the upper trachea with severe narrowing at the level of the thoracic inlet. Multiple locules of air in the pretracheal/left paratracheal space with tiny amount of fluid, cannot exclude tracheal injury/microperforation.   Patient is s/p IV Decadron and IV Unasyn. Seen and evaluated by ENT

## 2022-03-01 NOTE — PROGRESS NOTE ADULT - PROBLEM SELECTOR PLAN 4
- 02/10 A1c 8.7  - pt is on Lantus 16U qhs and premeal insulin  - Lantus 10 unit x1 tonight in anticipation of NPO. Readjust post-op.  - ISS   - monitor FS closely while on steroids

## 2022-03-01 NOTE — H&P ADULT - NSHPPHYSICALEXAM_GEN_ALL_CORE
Vital Signs Last 24 Hrs  T(C): 36.7 (01 Mar 2022 00:51), Max: 36.7 (28 Feb 2022 19:30)  T(F): 98 (01 Mar 2022 00:51), Max: 98.1 (28 Feb 2022 19:30)  HR: 71 (01 Mar 2022 00:51) (71 - 90)  BP: 129/79 (01 Mar 2022 00:51) (123/79 - 132/94)  BP(mean): --  RR: 20 (01 Mar 2022 00:51) (16 - 20)  SpO2: 96% (01 Mar 2022 00:51) (96% - 98%)    PHYSICAL EXAM:  General: Awake and alert.  No acute distress. + Dysphonia when speaking   Head: Normocephalic, atraumatic.  S/p craniotomy incision healing   Eyes: PERRL.  EOMI.  No scleral icterus.  No conjunctival pallor.  Mouth: Moist MM.  No oropharyngeal exudates.    Neck: Supple.  Full range of motion.  No JVD.    Heart: RRR.  Normal S1 and S2.  No murmurs, rubs, or gallops.   Lungs: Nonlabored breathing.  Good inspiratory effort.  CTAB.  No wheezes, crackles, or rhonchi.    Abdomen: BS+, soft, NT/ND.  No hepatomegaly.   Skin: Warm and dry.  No rashes.  Extremities: No cyanosis.  2+ peripheral pulses b/l. No LE edema   Musculoskeletal: No joint deformities.  No spinal or paraspinal tenderness.  Neuro: A&Ox3.  CN II-XII intact.  5/5 motor strength in UE and LE b/l.  Tactile sensation intact in UE and LE b/l.  Cerebellar function intact as assessed by finger-to-nose test.

## 2022-03-01 NOTE — PATIENT PROFILE ADULT - FALL HARM RISK - HARM RISK INTERVENTIONS
Assistance with ambulation/Assistance OOB with selected safe patient handling equipment/Communicate Risk of Fall with Harm to all staff/Discuss with provider need for PT consult/Monitor gait and stability/Reinforce activity limits and safety measures with patient and family/Tailored Fall Risk Interventions/Use of alarms - bed, chair and/or voice tab/Visual Cue: Yellow wristband and red socks/Bed in lowest position, wheels locked, appropriate side rails in place/Call bell, personal items and telephone in reach/Instruct patient to call for assistance before getting out of bed or chair/Non-slip footwear when patient is out of bed/[place] to call system/Physically safe environment - no spills, clutter or unnecessary equipment/Purposeful Proactive Rounding/Room/bathroom lighting operational, light cord in reach

## 2022-03-01 NOTE — PATIENT PROFILE ADULT - LANGUAGE ASSISTANCE NEEDED
Patient's daughter is at the bedside (Addie Norwood)/Yes-Patient/Caregiver accepts free interpretation services...

## 2022-03-01 NOTE — H&P ADULT - ATTENDING COMMENTS
Pt with gliosarcoma s/p resection 2 months ago, DM, HTN p/w sore throat, dysphagia and hoarseness.  Found to have tracheal stenosis on imaging.  On exam: no stridor, no resp distress.  Heart RRR, lungs CTA  Labs reviewed  CT chest reviewed   will continue steroids and unasyn  CT surgery consult

## 2022-03-01 NOTE — H&P ADULT - NSICDXPASTMEDICALHX_GEN_ALL_CORE_FT
PAST MEDICAL HISTORY:  Glioblastoma Grade 4 Gliosarcoma dx Jan 2022    Hypertension     Type 2 diabetes mellitus

## 2022-03-01 NOTE — PROGRESS NOTE ADULT - PROBLEM SELECTOR PLAN 1
- CT neck showing Mild soft tissue thickening of the glottis and subglottic larynx, and severe soft tissue thickening of the upper trachea with severe narrowing at the level of the thoracic inlet. Multiple locules of air in the pretracheal/left paratracheal space with tiny amount of fluid, cannot exclude tracheal injury/microperforation. No evidence for rim-enhancing fluid collection to suggest abscess.  - CT chest Circumferential thickening of the trachea with resultant severe narrowing at the level of thoracic inlet. Exact etiology is unclear, however, primary consideration is post intubation stenosis.  - ENT following - pt is s/p laryngoscope by ENT - Airway widely patent - no significant supralaryngeal swelling  - monitor Sp02 patient is on RA   - s/p IV Decadron 10mg x 1 and IV Unasyn 3g x 1 in ED   - Will finish up dex as per ENT today.  - F/u blood cx x 2 sets  - Continue IV Unasyn 3g Q6 hours  - CT showing thoracic inlet tracheal narrowing w/ locules of air in pre/paratracheal space --> CT surgery c/s recommending Bronch for eval.  -NPO @ MN.

## 2022-03-01 NOTE — PROGRESS NOTE ADULT - PROBLEM SELECTOR PLAN 5
DVT ppx: Will hold lovenox in anticipation of procedure tomorrow. SCD for now.     Comm  Spoke w/ dtr Addie updated on status. All questions/concerns addresse.d

## 2022-03-01 NOTE — PROGRESS NOTE ADULT - PROBLEM SELECTOR PLAN 2
- pt reports pain and difficulty swallowing   - CT showing Retained debris/secretions are seen along the right mid to lower trachea  - SLP recs noted - reg diet. WIll be NPO @ [facility] for procedure.

## 2022-03-02 ENCOUNTER — APPOINTMENT (OUTPATIENT)
Dept: THORACIC SURGERY | Facility: HOSPITAL | Age: 67
End: 2022-03-02

## 2022-03-02 ENCOUNTER — RESULT REVIEW (OUTPATIENT)
Age: 67
End: 2022-03-02

## 2022-03-02 PROBLEM — E11.9 TYPE 2 DIABETES MELLITUS WITHOUT COMPLICATIONS: Chronic | Status: ACTIVE | Noted: 2022-03-01

## 2022-03-02 PROBLEM — C71.9 MALIGNANT NEOPLASM OF BRAIN, UNSPECIFIED: Chronic | Status: ACTIVE | Noted: 2022-02-09

## 2022-03-02 LAB
ALBUMIN SERPL ELPH-MCNC: 3.1 G/DL — LOW (ref 3.3–5)
ALP SERPL-CCNC: 70 U/L — SIGNIFICANT CHANGE UP (ref 40–120)
ALT FLD-CCNC: 11 U/L — SIGNIFICANT CHANGE UP (ref 4–41)
ANION GAP SERPL CALC-SCNC: 10 MMOL/L — SIGNIFICANT CHANGE UP (ref 7–14)
APTT BLD: 24.1 SEC — LOW (ref 27–36.3)
AST SERPL-CCNC: 11 U/L — SIGNIFICANT CHANGE UP (ref 4–40)
BASOPHILS # BLD AUTO: 0 K/UL — SIGNIFICANT CHANGE UP (ref 0–0.2)
BASOPHILS NFR BLD AUTO: 0 % — SIGNIFICANT CHANGE UP (ref 0–2)
BILIRUB SERPL-MCNC: 0.4 MG/DL — SIGNIFICANT CHANGE UP (ref 0.2–1.2)
BLD GP AB SCN SERPL QL: NEGATIVE — SIGNIFICANT CHANGE UP
BUN SERPL-MCNC: 12 MG/DL — SIGNIFICANT CHANGE UP (ref 7–23)
CALCIUM SERPL-MCNC: 8.6 MG/DL — SIGNIFICANT CHANGE UP (ref 8.4–10.5)
CHLORIDE SERPL-SCNC: 96 MMOL/L — LOW (ref 98–107)
CO2 SERPL-SCNC: 29 MMOL/L — SIGNIFICANT CHANGE UP (ref 22–31)
CREAT SERPL-MCNC: 0.6 MG/DL — SIGNIFICANT CHANGE UP (ref 0.5–1.3)
EGFR: 106 ML/MIN/1.73M2 — SIGNIFICANT CHANGE UP
EOSINOPHIL # BLD AUTO: 0.01 K/UL — SIGNIFICANT CHANGE UP (ref 0–0.5)
EOSINOPHIL NFR BLD AUTO: 0.1 % — SIGNIFICANT CHANGE UP (ref 0–6)
GLUCOSE BLDC GLUCOMTR-MCNC: 162 MG/DL — HIGH (ref 70–99)
GLUCOSE BLDC GLUCOMTR-MCNC: 166 MG/DL — HIGH (ref 70–99)
GLUCOSE BLDC GLUCOMTR-MCNC: 170 MG/DL — HIGH (ref 70–99)
GLUCOSE BLDC GLUCOMTR-MCNC: 171 MG/DL — HIGH (ref 70–99)
GLUCOSE BLDC GLUCOMTR-MCNC: 176 MG/DL — HIGH (ref 70–99)
GLUCOSE BLDC GLUCOMTR-MCNC: 217 MG/DL — HIGH (ref 70–99)
GLUCOSE SERPL-MCNC: 186 MG/DL — HIGH (ref 70–99)
GRAM STN FLD: SIGNIFICANT CHANGE UP
HCT VFR BLD CALC: 35 % — LOW (ref 39–50)
HGB BLD-MCNC: 11.2 G/DL — LOW (ref 13–17)
IANC: 5.74 K/UL — SIGNIFICANT CHANGE UP (ref 1.5–8.5)
IMM GRANULOCYTES NFR BLD AUTO: 0.6 % — SIGNIFICANT CHANGE UP (ref 0–1.5)
INR BLD: 1.2 RATIO — HIGH (ref 0.88–1.16)
LYMPHOCYTES # BLD AUTO: 0.61 K/UL — LOW (ref 1–3.3)
LYMPHOCYTES # BLD AUTO: 9.1 % — LOW (ref 13–44)
MAGNESIUM SERPL-MCNC: 1.7 MG/DL — SIGNIFICANT CHANGE UP (ref 1.6–2.6)
MCHC RBC-ENTMCNC: 30.1 PG — SIGNIFICANT CHANGE UP (ref 27–34)
MCHC RBC-ENTMCNC: 32 GM/DL — SIGNIFICANT CHANGE UP (ref 32–36)
MCV RBC AUTO: 94.1 FL — SIGNIFICANT CHANGE UP (ref 80–100)
MONOCYTES # BLD AUTO: 0.3 K/UL — SIGNIFICANT CHANGE UP (ref 0–0.9)
MONOCYTES NFR BLD AUTO: 4.5 % — SIGNIFICANT CHANGE UP (ref 2–14)
NEUTROPHILS # BLD AUTO: 5.74 K/UL — SIGNIFICANT CHANGE UP (ref 1.8–7.4)
NEUTROPHILS NFR BLD AUTO: 85.7 % — HIGH (ref 43–77)
NRBC # BLD: 0 /100 WBCS — SIGNIFICANT CHANGE UP
NRBC # FLD: 0 K/UL — SIGNIFICANT CHANGE UP
PHOSPHATE SERPL-MCNC: 3.5 MG/DL — SIGNIFICANT CHANGE UP (ref 2.5–4.5)
PLATELET # BLD AUTO: 246 K/UL — SIGNIFICANT CHANGE UP (ref 150–400)
POTASSIUM SERPL-MCNC: 4 MMOL/L — SIGNIFICANT CHANGE UP (ref 3.5–5.3)
POTASSIUM SERPL-SCNC: 4 MMOL/L — SIGNIFICANT CHANGE UP (ref 3.5–5.3)
PROT SERPL-MCNC: 6.4 G/DL — SIGNIFICANT CHANGE UP (ref 6–8.3)
PROTHROM AB SERPL-ACNC: 14 SEC — HIGH (ref 10.5–13.4)
RBC # BLD: 3.72 M/UL — LOW (ref 4.2–5.8)
RBC # FLD: 12.8 % — SIGNIFICANT CHANGE UP (ref 10.3–14.5)
RH IG SCN BLD-IMP: POSITIVE — SIGNIFICANT CHANGE UP
SODIUM SERPL-SCNC: 135 MMOL/L — SIGNIFICANT CHANGE UP (ref 135–145)
SPECIMEN SOURCE: SIGNIFICANT CHANGE UP
WBC # BLD: 6.7 K/UL — SIGNIFICANT CHANGE UP (ref 3.8–10.5)
WBC # FLD AUTO: 6.7 K/UL — SIGNIFICANT CHANGE UP (ref 3.8–10.5)

## 2022-03-02 PROCEDURE — 88305 TISSUE EXAM BY PATHOLOGIST: CPT | Mod: 26

## 2022-03-02 PROCEDURE — 31645 BRNCHSC W/THER ASPIR 1ST: CPT

## 2022-03-02 PROCEDURE — 31624 DX BRONCHOSCOPE/LAVAGE: CPT

## 2022-03-02 PROCEDURE — 31625 BRONCHOSCOPY W/BIOPSY(S): CPT

## 2022-03-02 PROCEDURE — 99233 SBSQ HOSP IP/OBS HIGH 50: CPT

## 2022-03-02 PROCEDURE — 31630 BRONCHOSCOPY DILATE/FX REPR: CPT

## 2022-03-02 DEVICE — DILATION BALLOON: Type: IMPLANTABLE DEVICE | Status: FUNCTIONAL

## 2022-03-02 DEVICE — BLLN CRE 12 MM: Type: IMPLANTABLE DEVICE | Status: FUNCTIONAL

## 2022-03-02 DEVICE — IMPLANTABLE DEVICE: Type: IMPLANTABLE DEVICE | Status: FUNCTIONAL

## 2022-03-02 RX ORDER — FENTANYL CITRATE 50 UG/ML
50 INJECTION INTRAVENOUS
Refills: 0 | Status: DISCONTINUED | OUTPATIENT
Start: 2022-03-02 | End: 2022-03-02

## 2022-03-02 RX ORDER — INSULIN LISPRO 100/ML
VIAL (ML) SUBCUTANEOUS
Refills: 0 | Status: DISCONTINUED | OUTPATIENT
Start: 2022-03-02 | End: 2022-03-03

## 2022-03-02 RX ORDER — ONDANSETRON 8 MG/1
4 TABLET, FILM COATED ORAL ONCE
Refills: 0 | Status: DISCONTINUED | OUTPATIENT
Start: 2022-03-02 | End: 2022-03-02

## 2022-03-02 RX ORDER — ACETAMINOPHEN 500 MG
650 TABLET ORAL EVERY 6 HOURS
Refills: 0 | Status: DISCONTINUED | OUTPATIENT
Start: 2022-03-02 | End: 2022-03-03

## 2022-03-02 RX ORDER — INSULIN GLARGINE 100 [IU]/ML
10 INJECTION, SOLUTION SUBCUTANEOUS AT BEDTIME
Refills: 0 | Status: DISCONTINUED | OUTPATIENT
Start: 2022-03-02 | End: 2022-03-03

## 2022-03-02 RX ORDER — INSULIN LISPRO 100/ML
VIAL (ML) SUBCUTANEOUS AT BEDTIME
Refills: 0 | Status: DISCONTINUED | OUTPATIENT
Start: 2022-03-02 | End: 2022-03-03

## 2022-03-02 RX ORDER — FENTANYL CITRATE 50 UG/ML
25 INJECTION INTRAVENOUS
Refills: 0 | Status: DISCONTINUED | OUTPATIENT
Start: 2022-03-02 | End: 2022-03-02

## 2022-03-02 RX ADMIN — Medication 1 SPRAY(S): at 07:15

## 2022-03-02 RX ADMIN — Medication 2: at 00:32

## 2022-03-02 RX ADMIN — Medication 650 MILLIGRAM(S): at 12:00

## 2022-03-02 RX ADMIN — Medication 650 MILLIGRAM(S): at 11:29

## 2022-03-02 RX ADMIN — Medication 2: at 18:18

## 2022-03-02 RX ADMIN — AMPICILLIN SODIUM AND SULBACTAM SODIUM 200 GRAM(S): 250; 125 INJECTION, POWDER, FOR SUSPENSION INTRAMUSCULAR; INTRAVENOUS at 06:04

## 2022-03-02 RX ADMIN — Medication 3 MILLIGRAM(S): at 23:04

## 2022-03-02 RX ADMIN — Medication 1 SPRAY(S): at 18:18

## 2022-03-02 RX ADMIN — AMPICILLIN SODIUM AND SULBACTAM SODIUM 200 GRAM(S): 250; 125 INJECTION, POWDER, FOR SUSPENSION INTRAMUSCULAR; INTRAVENOUS at 23:04

## 2022-03-02 RX ADMIN — Medication 6 MILLIGRAM(S): at 05:22

## 2022-03-02 RX ADMIN — AMPICILLIN SODIUM AND SULBACTAM SODIUM 200 GRAM(S): 250; 125 INJECTION, POWDER, FOR SUSPENSION INTRAMUSCULAR; INTRAVENOUS at 18:18

## 2022-03-02 RX ADMIN — AMPICILLIN SODIUM AND SULBACTAM SODIUM 200 GRAM(S): 250; 125 INJECTION, POWDER, FOR SUSPENSION INTRAMUSCULAR; INTRAVENOUS at 12:37

## 2022-03-02 RX ADMIN — Medication 2: at 07:13

## 2022-03-02 RX ADMIN — LACOSAMIDE 120 MILLIGRAM(S): 50 TABLET ORAL at 18:17

## 2022-03-02 RX ADMIN — Medication 2: at 12:49

## 2022-03-02 RX ADMIN — AMPICILLIN SODIUM AND SULBACTAM SODIUM 200 GRAM(S): 250; 125 INJECTION, POWDER, FOR SUSPENSION INTRAMUSCULAR; INTRAVENOUS at 00:29

## 2022-03-02 RX ADMIN — LACOSAMIDE 120 MILLIGRAM(S): 50 TABLET ORAL at 05:22

## 2022-03-02 RX ADMIN — PANTOPRAZOLE SODIUM 40 MILLIGRAM(S): 20 TABLET, DELAYED RELEASE ORAL at 12:50

## 2022-03-02 RX ADMIN — INSULIN GLARGINE 10 UNIT(S): 100 INJECTION, SOLUTION SUBCUTANEOUS at 23:04

## 2022-03-02 NOTE — PROGRESS NOTE ADULT - SUBJECTIVE AND OBJECTIVE BOX
[facility] Division of Hospital Medicine  Nehemiah Choudhury) MD Deon  Pager [phone]    SUBJECTIVE:  Chief complaint: tracheal stenosis.    Pt seen and evaluated at bedside this morning. Overnight event noted - pt complained of HA, now resolved. He continues to have some mild sore throat. No f/c, SOB, CP.    ROS: All systems negative except as noted.      Vital Signs Last 24 Hrs  T(C): 36.7 (02 Mar 2022 13:30), Max: 36.7 (02 Mar 2022 13:30)  T(F): 98 (02 Mar 2022 13:30), Max: 98 (02 Mar 2022 13:30)  HR: 76 (02 Mar 2022 13:30) (67 - 80)  BP: 144/83 (02 Mar 2022 13:30) (103/71 - 144/83)  BP(mean): --  RR: 18 (02 Mar 2022 13:30) (17 - 18)  SpO2: 97% (02 Mar 2022 13:30) (96% - 98%)    PHYSICAL EXAM:  Gen- In bed, comfortable, NAD  Resp- CTAB, good effort. No r/r/w. No accessory muscle use.  CVS- RRR, S1S2, no g/r/m. No LE edema.  GI- Soft abd, NT, ND, +BSx4. No HSM.  MSK- No C/C. ROM intact. No crepitus.    MEDICATION:  MEDICATIONS  (STANDING):  ampicillin/sulbactam  IVPB 3 Gram(s) IV Intermittent every 6 hours  dextrose 40% Gel 15 Gram(s) Oral once  dextrose 5%. 1000 milliLiter(s) (50 mL/Hr) IV Continuous <Continuous>  dextrose 5%. 1000 milliLiter(s) (100 mL/Hr) IV Continuous <Continuous>  dextrose 50% Injectable 25 Gram(s) IV Push once  dextrose 50% Injectable 12.5 Gram(s) IV Push once  dextrose 50% Injectable 25 Gram(s) IV Push once  fluticasone propionate 50 MICROgram(s)/spray Nasal Spray 1 Spray(s) Both Nostrils two times a day  glucagon  Injectable 1 milliGRAM(s) IntraMuscular once  influenza  Vaccine (HIGH DOSE) 0.7 milliLiter(s) IntraMuscular once  insulin lispro (ADMELOG) corrective regimen sliding scale   SubCutaneous every 6 hours  lacosamide IVPB 100 milliGRAM(s) IV Intermittent every 12 hours  lactated ringers. 1000 milliLiter(s) (100 mL/Hr) IV Continuous <Continuous>  pantoprazole  Injectable 40 milliGRAM(s) IV Push daily    MEDICATIONS  (PRN):  acetaminophen     Tablet .. 650 milliGRAM(s) Oral every 6 hours PRN Mild Pain (1 - 3), Moderate Pain (4 - 6)  melatonin 3 milliGRAM(s) Oral at bedtime PRN Insomnia        LABORATORY:                          11.2   6.70  )-----------( 246      ( 02 Mar 2022 07:21 )             35.0     03-02    135  |  96<L>  |  12  ----------------------------<  186<H>  4.0   |  29  |  0.60    Ca    8.6      02 Mar 2022 07:21  Phos  3.5     03-02  Mg     1.70     03-02    TPro  6.4  /  Alb  3.1<L>  /  TBili  0.4  /  DBili  x   /  AST  11  /  ALT  11  /  AlkPhos  70  03-02    PT/INR - ( 02 Mar 2022 07:21 )   PT: 14.0 sec;   INR: 1.20 ratio         PTT - ( 02 Mar 2022 07:21 )  PTT:24.1 sec          COVID-19 PCR: NotDetec (01 Mar 2022 18:06)  COVID-19 PCR: NotDetec (28 Feb 2022 21:32)  COVID-19 PCR: NotDetec (24 Feb 2022 05:30)  COVID-19 PCR: NotDetec (18 Feb 2022 06:50)  COVID-19 PCR: NotDetec (11 Feb 2022 22:10)  COVID-19 PCR: NotDetec (10 Feb 2022 11:57)  COVID-19 PCR: NotDetec (09 Feb 2022 10:20)  COVID-19 PCR: Detected (08 Feb 2022 22:50)  COVID-19 PCR: NotDetec (06 Feb 2022 13:16)  COVID-19 PCR: NotDetec (04 Feb 2022 12:20)  COVID-19 PCR: NotDetec (26 Jan 2022 22:36)  COVID-19 PCR: NotDetec (24 Jan 2022 19:40)

## 2022-03-02 NOTE — CHART NOTE - NSCHARTNOTEFT_GEN_A_CORE
Contacted by RN during rounds regarding patient having head pain. I examined the patient at the bedside and he noted to have pain extending post-auricularly down to neck.  Patient's voice hoarse at baseline. Denies SOB, increased difficulty swallowing, itching at the back of throat, palpitations, headache or any other acute symptoms of concern.  PHYSICAL EXAM:  GENERAL: NAD, well-developed, resting comfortably in bed   ENT: no enlargement of tonsils, no petechia on palate, uvula midline   CHEST/LUNG: Clear to auscultation bilaterally; No wheeze/rhonchi/rale  HEART: Regular rate and rhythm; No murmurs, rubs, or gallops  PSYCH: AAOx3    Plan  -Patient given 650 mg PO Tylenol  -Scheduled for bronchoscopy tomorrow  -Pain/discomfort likely due to tracheal inflammation   -Will continue to monitor   -Advised RN to notify me if patient experiences any other symptoms of concern
Patient s/p flex bronch, balloon dilation, tracheal biopsy and BAL.  Patient resting comfortably, states breathing is better now.  Patient tolerating po, voiding.    Vital Signs Last 24 Hrs  T(C): 36.4 (02 Mar 2022 16:00), Max: 36.7 (02 Mar 2022 13:30)  T(F): 97.6 (02 Mar 2022 16:00), Max: 98 (02 Mar 2022 13:30)  HR: 68 (02 Mar 2022 16:15) (66 - 82)  BP: 118/71 (02 Mar 2022 16:15) (103/71 - 144/83)  BP(mean): 82 (02 Mar 2022 16:15) (77 - 91)  RR: 20 (02 Mar 2022 16:15) (11 - 20)  SpO2: 100% (02 Mar 2022 16:15) (94% - 100%)    I&O's Detail    MEDICATIONS  (STANDING):  ampicillin/sulbactam  IVPB 3 Gram(s) IV Intermittent every 6 hours  dextrose 40% Gel 15 Gram(s) Oral once  dextrose 5%. 1000 milliLiter(s) (50 mL/Hr) IV Continuous <Continuous>  dextrose 5%. 1000 milliLiter(s) (100 mL/Hr) IV Continuous <Continuous>  dextrose 50% Injectable 25 Gram(s) IV Push once  dextrose 50% Injectable 12.5 Gram(s) IV Push once  dextrose 50% Injectable 25 Gram(s) IV Push once  fluticasone propionate 50 MICROgram(s)/spray Nasal Spray 1 Spray(s) Both Nostrils two times a day  glucagon  Injectable 1 milliGRAM(s) IntraMuscular once  influenza  Vaccine (HIGH DOSE) 0.7 milliLiter(s) IntraMuscular once  insulin glargine Injectable (LANTUS) 10 Unit(s) SubCutaneous at bedtime  insulin lispro (ADMELOG) corrective regimen sliding scale   SubCutaneous every 6 hours  lacosamide IVPB 100 milliGRAM(s) IV Intermittent every 12 hours  lactated ringers. 1000 milliLiter(s) (100 mL/Hr) IV Continuous <Continuous>  pantoprazole  Injectable 40 milliGRAM(s) IV Push daily    A/P: S/P Flex Bronch, balloon dilation, tracheal biopsy and BAL   Follow up biopsy result  Follow up with Dr. Garland in 3 weeks  Continue care as per primary team
CT surgery consulted for CT scan findings showing thoracic inlet tracheal narrowing w/ locules of air in pre/paratracheal space - F/U recs ---
Chart and patient information reviewed. Agree with current plan. d/w thoracic surgery regarding bronchoscopy. OK for outpatient follow up with Dr Gupta. Can consult as needed.

## 2022-03-02 NOTE — PROGRESS NOTE ADULT - PROBLEM SELECTOR PLAN 2
- pt reports pain and difficulty swallowing   - CT showing Retained debris/secretions are seen along the right mid to lower trachea  - Pending bronchoscopy w/ CTS.  - F/u recs re: diet post-procedure. May need SLP re-eval.

## 2022-03-02 NOTE — PROGRESS NOTE ADULT - PROBLEM SELECTOR PLAN 4
- 02/10 A1c 8.7  - pt is on Lantus 16U qhs and premeal insulin  - FS stable and at goal.   - Will continue lantus 10units QHS while in NPO status. Can titrate up when resuming PO intake.  - ISS   - Trend FS.

## 2022-03-02 NOTE — PROGRESS NOTE ADULT - PROBLEM SELECTOR PLAN 1
- CT neck showing Mild soft tissue thickening of the glottis and subglottic larynx, and severe soft tissue thickening of the upper trachea with severe narrowing at the level of the thoracic inlet. Multiple locules of air in the pretracheal/left paratracheal space with tiny amount of fluid, cannot exclude tracheal injury/microperforation. No evidence for rim-enhancing fluid collection to suggest abscess.  - CT chest Circumferential thickening of the trachea with resultant severe narrowing at the level of thoracic inlet. Exact etiology is unclear, however, primary consideration is post intubation stenosis.  - ENT following - pt is s/p laryngoscope by ENT - Airway widely patent - no significant supralaryngeal swelling  - S/p course of dex. Monitor off steroid.   - F/u blood cx x 2 sets  - Continue IV Unasyn 3g Q6 hours - course TBD based on findings/workup.   - CT showing thoracic inlet tracheal narrowing w/ locules of air in pre/paratracheal space --> CT surgery to eval w/ bronch.  -NPO @ [place].

## 2022-03-02 NOTE — PROGRESS NOTE ADULT - PROBLEM SELECTOR PLAN 5
DVT ppx: Holding lovenox. Pending OR today - resume once ok w/ CTS.     Comm  Last spoke w/ Addie daughter 3/1. Await new info. Will f/u shortly.

## 2022-03-02 NOTE — BRIEF OPERATIVE NOTE - NSICDXBRIEFPROCEDURE_GEN_ALL_CORE_FT
PROCEDURES:  Flexible bronchoscopy 02-Mar-2022 15:09:07  Cody Wei  Balloon dilation of trachea 02-Mar-2022 15:10:16  Cody Wei  Tracheal biopsy 02-Mar-2022 15:10:47  Cody Wei

## 2022-03-03 ENCOUNTER — TRANSCRIPTION ENCOUNTER (OUTPATIENT)
Age: 67
End: 2022-03-03

## 2022-03-03 VITALS
SYSTOLIC BLOOD PRESSURE: 102 MMHG | TEMPERATURE: 98 F | RESPIRATION RATE: 17 BRPM | OXYGEN SATURATION: 97 % | HEART RATE: 83 BPM | DIASTOLIC BLOOD PRESSURE: 64 MMHG

## 2022-03-03 LAB
ANION GAP SERPL CALC-SCNC: 10 MMOL/L — SIGNIFICANT CHANGE UP (ref 7–14)
BASOPHILS # BLD AUTO: 0.01 K/UL — SIGNIFICANT CHANGE UP (ref 0–0.2)
BASOPHILS NFR BLD AUTO: 0.1 % — SIGNIFICANT CHANGE UP (ref 0–2)
BUN SERPL-MCNC: 12 MG/DL — SIGNIFICANT CHANGE UP (ref 7–23)
CALCIUM SERPL-MCNC: 8.6 MG/DL — SIGNIFICANT CHANGE UP (ref 8.4–10.5)
CHLORIDE SERPL-SCNC: 97 MMOL/L — LOW (ref 98–107)
CO2 SERPL-SCNC: 26 MMOL/L — SIGNIFICANT CHANGE UP (ref 22–31)
CREAT SERPL-MCNC: 0.65 MG/DL — SIGNIFICANT CHANGE UP (ref 0.5–1.3)
EGFR: 104 ML/MIN/1.73M2 — SIGNIFICANT CHANGE UP
EOSINOPHIL # BLD AUTO: 0.01 K/UL — SIGNIFICANT CHANGE UP (ref 0–0.5)
EOSINOPHIL NFR BLD AUTO: 0.1 % — SIGNIFICANT CHANGE UP (ref 0–6)
GLUCOSE BLDC GLUCOMTR-MCNC: 128 MG/DL — HIGH (ref 70–99)
GLUCOSE BLDC GLUCOMTR-MCNC: 165 MG/DL — HIGH (ref 70–99)
GLUCOSE BLDC GLUCOMTR-MCNC: 213 MG/DL — HIGH (ref 70–99)
GLUCOSE SERPL-MCNC: 187 MG/DL — HIGH (ref 70–99)
HCT VFR BLD CALC: 34.6 % — LOW (ref 39–50)
HGB BLD-MCNC: 11.5 G/DL — LOW (ref 13–17)
IANC: 7.24 K/UL — SIGNIFICANT CHANGE UP (ref 1.5–8.5)
IMM GRANULOCYTES NFR BLD AUTO: 0.7 % — SIGNIFICANT CHANGE UP (ref 0–1.5)
LYMPHOCYTES # BLD AUTO: 1.15 K/UL — SIGNIFICANT CHANGE UP (ref 1–3.3)
LYMPHOCYTES # BLD AUTO: 12.9 % — LOW (ref 13–44)
MAGNESIUM SERPL-MCNC: 1.8 MG/DL — SIGNIFICANT CHANGE UP (ref 1.6–2.6)
MCHC RBC-ENTMCNC: 31 PG — SIGNIFICANT CHANGE UP (ref 27–34)
MCHC RBC-ENTMCNC: 33.2 GM/DL — SIGNIFICANT CHANGE UP (ref 32–36)
MCV RBC AUTO: 93.3 FL — SIGNIFICANT CHANGE UP (ref 80–100)
MONOCYTES # BLD AUTO: 0.47 K/UL — SIGNIFICANT CHANGE UP (ref 0–0.9)
MONOCYTES NFR BLD AUTO: 5.3 % — SIGNIFICANT CHANGE UP (ref 2–14)
NEUTROPHILS # BLD AUTO: 7.24 K/UL — SIGNIFICANT CHANGE UP (ref 1.8–7.4)
NEUTROPHILS NFR BLD AUTO: 80.9 % — HIGH (ref 43–77)
NIGHT BLUE STAIN TISS: SIGNIFICANT CHANGE UP
NRBC # BLD: 0 /100 WBCS — SIGNIFICANT CHANGE UP
NRBC # FLD: 0 K/UL — SIGNIFICANT CHANGE UP
PHOSPHATE SERPL-MCNC: 2.6 MG/DL — SIGNIFICANT CHANGE UP (ref 2.5–4.5)
PLATELET # BLD AUTO: 225 K/UL — SIGNIFICANT CHANGE UP (ref 150–400)
POTASSIUM SERPL-MCNC: 4 MMOL/L — SIGNIFICANT CHANGE UP (ref 3.5–5.3)
POTASSIUM SERPL-SCNC: 4 MMOL/L — SIGNIFICANT CHANGE UP (ref 3.5–5.3)
RBC # BLD: 3.71 M/UL — LOW (ref 4.2–5.8)
RBC # FLD: 12.6 % — SIGNIFICANT CHANGE UP (ref 10.3–14.5)
SODIUM SERPL-SCNC: 133 MMOL/L — LOW (ref 135–145)
SPECIMEN SOURCE: SIGNIFICANT CHANGE UP
WBC # BLD: 8.94 K/UL — SIGNIFICANT CHANGE UP (ref 3.8–10.5)
WBC # FLD AUTO: 8.94 K/UL — SIGNIFICANT CHANGE UP (ref 3.8–10.5)

## 2022-03-03 PROCEDURE — 71045 X-RAY EXAM CHEST 1 VIEW: CPT | Mod: 26

## 2022-03-03 PROCEDURE — 99239 HOSP IP/OBS DSCHRG MGMT >30: CPT

## 2022-03-03 RX ORDER — INSULIN GLARGINE 100 [IU]/ML
16 INJECTION, SOLUTION SUBCUTANEOUS
Qty: 3 | Refills: 0
Start: 2022-03-03 | End: 2022-04-01

## 2022-03-03 RX ORDER — PANTOPRAZOLE SODIUM 20 MG/1
1 TABLET, DELAYED RELEASE ORAL
Qty: 30 | Refills: 0
Start: 2022-03-03 | End: 2022-04-01

## 2022-03-03 RX ORDER — LACOSAMIDE 50 MG/1
100 TABLET ORAL EVERY 12 HOURS
Refills: 0 | Status: DISCONTINUED | OUTPATIENT
Start: 2022-03-03 | End: 2022-03-03

## 2022-03-03 RX ORDER — PANTOPRAZOLE SODIUM 20 MG/1
40 TABLET, DELAYED RELEASE ORAL
Refills: 0 | Status: DISCONTINUED | OUTPATIENT
Start: 2022-03-03 | End: 2022-03-03

## 2022-03-03 RX ORDER — ACETAMINOPHEN 500 MG
2 TABLET ORAL
Qty: 0 | Refills: 0 | DISCHARGE
Start: 2022-03-03

## 2022-03-03 RX ORDER — FLUTICASONE PROPIONATE 50 MCG
1 SPRAY, SUSPENSION NASAL
Qty: 1 | Refills: 0
Start: 2022-03-03 | End: 2022-04-01

## 2022-03-03 RX ORDER — INSULIN LISPRO 100/ML
5 VIAL (ML) SUBCUTANEOUS
Qty: 5 | Refills: 0
Start: 2022-03-03 | End: 2022-04-01

## 2022-03-03 RX ADMIN — Medication 2: at 12:45

## 2022-03-03 RX ADMIN — Medication 1 SPRAY(S): at 17:55

## 2022-03-03 RX ADMIN — LACOSAMIDE 100 MILLIGRAM(S): 50 TABLET ORAL at 17:54

## 2022-03-03 RX ADMIN — PANTOPRAZOLE SODIUM 40 MILLIGRAM(S): 20 TABLET, DELAYED RELEASE ORAL at 12:13

## 2022-03-03 RX ADMIN — AMPICILLIN SODIUM AND SULBACTAM SODIUM 200 GRAM(S): 250; 125 INJECTION, POWDER, FOR SUSPENSION INTRAMUSCULAR; INTRAVENOUS at 06:03

## 2022-03-03 RX ADMIN — Medication 1 SPRAY(S): at 05:06

## 2022-03-03 RX ADMIN — LACOSAMIDE 120 MILLIGRAM(S): 50 TABLET ORAL at 05:06

## 2022-03-03 RX ADMIN — Medication 1 TABLET(S): at 19:11

## 2022-03-03 RX ADMIN — Medication 1: at 08:50

## 2022-03-03 RX ADMIN — AMPICILLIN SODIUM AND SULBACTAM SODIUM 200 GRAM(S): 250; 125 INJECTION, POWDER, FOR SUSPENSION INTRAMUSCULAR; INTRAVENOUS at 12:14

## 2022-03-03 NOTE — OCCUPATIONAL THERAPY INITIAL EVALUATION ADULT - GENERAL OBSERVATIONS, REHAB EVAL
Pt received semisupine in bed. No acute distress. Patient agreed to evaluation from Occupational Therapist. +Heplock, +Pulse ox to Right finger.

## 2022-03-03 NOTE — PHYSICAL THERAPY INITIAL EVALUATION ADULT - DIAGNOSIS, PT EVAL
Upon evaluation, pt independent with all mobility without assistive device; appears at functional baseline

## 2022-03-03 NOTE — PHYSICAL THERAPY INITIAL EVALUATION ADULT - PERTINENT HX OF CURRENT PROBLEM, REHAB EVAL
Pt is a 67 year old male presenting from outpatient ENT office for "bilateral vocal cord edema and mucous coming from trachea". CT neck showing Mild soft tissue thickening of the glottis and subglottic larynx, and severe soft tissue thickening of the upper trachea with severe narrowing at the level of the thoracic inlet. Multiple locules of air in the pretracheal/left paratracheal space with tiny amount of fluid, cannot exclude tracheal injury/microperforation.

## 2022-03-03 NOTE — PROGRESS NOTE ADULT - PROBLEM SELECTOR PLAN 5
DVT ppx: Holding lovenox. Pending OR today - resume once ok w/ CTS.     Comm  Last spoke w/ Addie daughter 3/1. Await new info. Will f/u shortly. DVT ppx: Ambulatory -- dispo anticipated today. Will not order now.    Comm  Last spoke w/ Addie daughter 3/3. Updated on plan of care and follow up needs.      Dispo- to home w/ fam. 35 min spent preparing DC, counseling pt, and coordination of care.

## 2022-03-03 NOTE — PROGRESS NOTE ADULT - PROBLEM SELECTOR PLAN 3
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  - follow up with neurosurgery as OP. Will clarify regimen w/ fam
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  - follow up with neurosurgery as OP. Will clarify regimen w/ fam
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  - follow up with neurosurgery as OP. Will clarify regimen w/ fam

## 2022-03-03 NOTE — DISCHARGE NOTE NURSING/CASE MANAGEMENT/SOCIAL WORK - NSDCPEFALRISK_GEN_ALL_CORE
For information on Fall & Injury Prevention, visit: [email or web address] OR  [email or web address] OR  https://www.cdc.gov/steadi/patient.html

## 2022-03-03 NOTE — DISCHARGE NOTE PROVIDER - NSDCCPCAREPLAN_GEN_ALL_CORE_FT
PRINCIPAL DISCHARGE DIAGNOSIS  Diagnosis: Vocal cord edema  Assessment and Plan of Treatment: Continue medications as prescribed. Follow up with your primary care doctor for further evaluation and management. Please call to make an appointment within 1-2 weeks of discharge.        SECONDARY DISCHARGE DIAGNOSES  Diagnosis: Glioblastoma  Assessment and Plan of Treatment: Continue taking Vimpat 100mg twice daily for seizure prophylaxis and follow up with neurosurgery outpatient for further treatment and management.    Diagnosis: Tracheal stenosis  Assessment and Plan of Treatment: Cat scan of neck showing mild soft tissue thickening of the glottis and subglottic larynx, and severe soft tissue thickening of the upper trachea with severe narrowing at the level of the thoracic inlet. Multiple locules of air in the pretracheal/left paratracheal space with tiny amount of fluid, cannot exclude tracheal injury/microperforation. No evidence for rim-enhancing fluid collection to suggest abscess.  - Cat scan of chest showing circumferential thickening of the trachea with resultant severe narrowing at the level of thoracic inlet. Exact etiology is unclear, however, primary consideration is post intubation stenosis.  - ENT saw you while you were in the hospital and passed a laryngoscope which showed your airway was widely patent - no significant supralaryngeal swelling.   - You were given a course of steroids for the swelling.   - You were given intravenous antibiotics (Unasyn)    Diagnosis: Dysphagia  Assessment and Plan of Treatment: You reported some pain and difficulty swallowing. The cat scan showed some debris in your trachea which resolved after bronchoscopy.    Diagnosis: Type 2 diabetes mellitus  Assessment and Plan of Treatment: Continue your medication regimen and a consistent carbohydrate diet (Meaning eating the same amount of carbohydrates at the same time each day). Monitor blood glucose levels throughout the day before meals and at bedtime. Record blood sugars and bring to outpatient providers appointment in order to be reviewed by your doctor for management modifications. If your sugars are more than 400 or less than 70 you should contact your PCP immediately. Monitor for signs/symptoms of low blood glucose, such as, dizziness, altered mental status, or cool/clammy skin. In addition, monitor for signs/symptoms of high blood glucose, such as, feeling hot, dry, fatigued, or with increased thirst/urination. Make regular podiatry appointments in order to have feet checked for wounds and uncontrolled toe nail growth to prevent infections, as well as, appointments with an ophthalmologist to monitor your vision.       PRINCIPAL DISCHARGE DIAGNOSIS  Diagnosis: Vocal cord edema  Assessment and Plan of Treatment: Continue medications as prescribed. Follow up with your primary care doctor for further evaluation and management. Please call to make an appointment within 1-2 weeks of discharge.        SECONDARY DISCHARGE DIAGNOSES  Diagnosis: Type 2 diabetes mellitus  Assessment and Plan of Treatment: Continue your medication regimen and a consistent carbohydrate diet (Meaning eating the same amount of carbohydrates at the same time each day). Monitor blood glucose levels throughout the day before meals and at bedtime. Record blood sugars and bring to outpatient providers appointment in order to be reviewed by your doctor for management modifications. If your sugars are more than 400 or less than 70 you should contact your PCP immediately. Monitor for signs/symptoms of low blood glucose, such as, dizziness, altered mental status, or cool/clammy skin. In addition, monitor for signs/symptoms of high blood glucose, such as, feeling hot, dry, fatigued, or with increased thirst/urination. Make regular podiatry appointments in order to have feet checked for wounds and uncontrolled toe nail growth to prevent infections, as well as, appointments with an ophthalmologist to monitor your vision.      Diagnosis: Tracheal stenosis  Assessment and Plan of Treatment: Cat scan of neck showing mild soft tissue thickening of the glottis and subglottic larynx, and severe soft tissue thickening of the upper trachea with severe narrowing at the level of the thoracic inlet. Multiple locules of air in the pretracheal/left paratracheal space with tiny amount of fluid, cannot exclude tracheal injury/microperforation. No evidence for rim-enhancing fluid collection to suggest abscess.  - Cat scan of chest showing circumferential thickening of the trachea with resultant severe narrowing at the level of thoracic inlet. Exact etiology is unclear, however, primary consideration is post intubation stenosis.  - you were seen by ENT while you were in the hospital and passed a laryngoscope which showed your airway was widely patent - no significant supralaryngeal swelling. You were given a course of steroids for the swelling  - You were given intravenous antibiotics (Unasyn), changed to Augmentin 875mg oral 2x daily for 3 more days    Diagnosis: Glioblastoma  Assessment and Plan of Treatment: Continue taking Vimpat 100mg twice daily for seizure prophylaxis and follow up with neurosurgery outpatient for further treatment and management    Diagnosis: Dysphagia  Assessment and Plan of Treatment: You reported some pain and difficulty swallowing. The CT scan showed some debris in your trachea which resolved after bronchoscopy.     PRINCIPAL DISCHARGE DIAGNOSIS  Diagnosis: Vocal cord edema  Assessment and Plan of Treatment: Continue medications as prescribed. Follow up with your primary care doctor and ENT for further evaluation and management. Please call to make an appointment within 1-2 weeks of discharge.        SECONDARY DISCHARGE DIAGNOSES  Diagnosis: Type 2 diabetes mellitus  Assessment and Plan of Treatment: HgA1C level 8.7%. Continue your medication regimen and a consistent carbohydrate diet. Monitor blood glucose levels throughout the day before meals and at bedtime. Record blood sugars and bring to outpatient providers appointment in order to be reviewed by your doctor for management modifications. If your sugars are more than 400 or less than 70 you should contact your PCP immediately. Monitor for signs/symptoms of low blood glucose, such as, dizziness, altered mental status, or cool/clammy skin. In addition, monitor for signs/symptoms of high blood glucose, such as, feeling hot, dry, fatigued, or with increased thirst/urination. Make regular podiatry appointments in order to have feet checked for wounds and uncontrolled toe nail growth to prevent infections, as well as, appointments with an ophthalmologist to monitor your vision.      Diagnosis: Tracheal stenosis  Assessment and Plan of Treatment: Cat scan of neck showing mild soft tissue thickening of the glottis and subglottic larynx, and severe soft tissue thickening of the upper trachea with severe narrowing at the level of the thoracic inlet. Multiple locules of air in the pretracheal/left paratracheal space with tiny amount of fluid, No evidence for rim-enhancing fluid collection to suggest abscess.  - Cat scan of chest showing circumferential thickening of the trachea with resultant severe narrowing at the level of thoracic inlet.   - you were seen by ENT while you were in the hospital and passed a laryngoscope which showed your airway was widely patent - no significant supralaryngeal swelling. You were given a course of steroids for the swelling  - 3/2 pt s/p Flex Bronch, balloon dilation, tracheal biopsy and BAL by CT Surgery. Follow up with CTSx Dr Garland in 3 weeks to follow up on Biopsy results  - You were given intravenous antibiotics (Unasyn), changed to Augmentin 875mg oral 2x daily for 3 more days  - 3/3 CXR showed -lungs are clear. There is no pneumothorax or pleural effusion. No acute bony abnormalities    Diagnosis: Glioblastoma  Assessment and Plan of Treatment: Continue taking Vimpat 100mg twice daily for seizure prophylaxis and follow up with Neurosurgery outpatient for further treatment and management    Diagnosis: Dysphagia  Assessment and Plan of Treatment: You reported some pain and difficulty swallowing. The CT scan showed some debris in your trachea which resolved after bronchoscopy.

## 2022-03-03 NOTE — PROGRESS NOTE ADULT - PROBLEM SELECTOR PROBLEM 3
Chief Complaint   Patient presents with     Strabismus Follow Up     partially accomodative ET. mom notes near [facility]. occasionally takes off glasses when using a tablet. no change in crossing with glasses on. still with worsed crossing without glasses. no AHP. no VA concerns. no h/o patching.        
Glioblastoma

## 2022-03-03 NOTE — PHYSICAL THERAPY INITIAL EVALUATION ADULT - DISCHARGE DISPOSITION, PT EVAL
Recommend discharge home without PT services. Pt is independent with all functional mobility; appears at functional baseline. Pt to be removed from PT program.

## 2022-03-03 NOTE — SWALLOW BEDSIDE ASSESSMENT ADULT - ASR SWALLOW RECOMMEND DIAG
Objective testing NOT warranted given no concerns for pharyngeal dysphagia at this time.
Cinesophagram NOT warranted at this time given patient recently completed objective testing on 2/16/22 (please see full report), at which time easy to chew with thin liquids via small single sips was recommended.

## 2022-03-03 NOTE — PROGRESS NOTE ADULT - ASSESSMENT
Assessment: S/P Flex Bronch, balloon dilation, tracheal biopsy and BAL     Plan:  - Pt CXR reviewed  - F/u Biopsy  - F/u w/ Dr. Garland in 3 weeks  - Cont care as per primary team  - D/c planning as per primary team, no objection from thoracic surgery standpoint to discharge     Recall with any questions and concerns  Thoracic Surgery  [id number]  
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

## 2022-03-03 NOTE — DISCHARGE NOTE PROVIDER - NSDCMRMEDTOKEN_GEN_ALL_CORE_FT
atorvastatin 20 mg oral tablet: 1 tab(s) orally once a day (at bedtime)  dexamethasone 2 mg oral tablet: 1 tab(s) orally 2 times a day  escitalopram 5 mg oral tablet: 1 tab(s) orally once a day  fluticasone 50 mcg/inh nasal spray: 1 spray(s) nasal 2 times a day  gabapentin 100 mg oral capsule: 1 cap(s) orally once a day (at bedtime)  HumaLOG KwikPen 100 units/mL injectable solution: 5 unit(s) subcutaneous 3 times a day (with meals)   Lantus Solostar Pen 100 units/mL subcutaneous solution: 16 unit(s) subcutaneous once a day (at bedtime)   melatonin 3 mg oral tablet: 1 tab(s) orally once a day (at bedtime)  menthol-benzocaine 3.6 mg-15 mg mucous membrane lozenge: 1 lozenge mucous membrane 3 times a day, As Needed  metFORMIN 1000 mg oral tablet: 1 tab(s) orally 2 times a day  pantoprazole 40 mg oral delayed release tablet: 1 tab(s) orally once a day (before a meal)  polyethylene glycol 3350 oral powder for reconstitution: 17 gram(s) orally every 12 hours, As Needed for constipation (if no BM x 2 days).   senna oral tablet: 2 tab(s) orally once a day (at bedtime)  sodium chloride 1 g oral tablet: 1 tab(s) orally 2 times a day   Vimpat 100 mg oral tablet: 1 tab(s) orally 2 times a day   acetaminophen 325 mg oral tablet: 2 tab(s) orally every 6 hours, As needed, Mild Pain (1 - 3), Moderate Pain (4 - 6)  amoxicillin-clavulanate 875 mg-125 mg oral tablet: 1 tab(s) orally every 12 hours  atorvastatin 20 mg oral tablet: 1 tab(s) orally once a day (at bedtime)  escitalopram 5 mg oral tablet: 1 tab(s) orally once a day  fluticasone 50 mcg/inh nasal spray: 1 spray(s) nasal 2 times a day  gabapentin 100 mg oral capsule: 1 cap(s) orally once a day (at bedtime)  HumaLOG KwikPen 100 units/mL injectable solution: 5 unit(s) subcutaneous 3 times a day (with meals)   Lantus Solostar Pen 100 units/mL subcutaneous solution: 16 unit(s) subcutaneous once a day (at bedtime)   melatonin 3 mg oral tablet: 1 tab(s) orally once a day (at bedtime)  metFORMIN 1000 mg oral tablet: 1 tab(s) orally 2 times a day  pantoprazole 40 mg oral delayed release tablet: 1 tab(s) orally once a day (before a meal)  polyethylene glycol 3350 oral powder for reconstitution: 17 gram(s) orally every 12 hours, As Needed for constipation (if no BM x 2 days).   senna oral tablet: 2 tab(s) orally once a day (at bedtime)  sodium chloride 1 g oral tablet: 1 tab(s) orally 2 times a day   Vimpat 100 mg oral tablet: 1 tab(s) orally 2 times a day   acetaminophen 325 mg oral tablet: 2 tab(s) orally every 6 hours, As needed, Mild Pain (1 - 3), Moderate Pain (4 - 6)  amoxicillin-clavulanate 875 mg-125 mg oral tablet: 1 tab(s) orally every 12 hours  atorvastatin 20 mg oral tablet: 1 tab(s) orally once a day (at bedtime)  escitalopram 5 mg oral tablet: 1 tab(s) orally once a day  fluticasone 50 mcg/inh nasal spray: 1 spray(s) nasal 2 times a day  gabapentin 100 mg oral capsule: 1 cap(s) orally once a day (at bedtime)  HumaLOG KwikPen 100 units/mL injectable solution: 5 unit(s) subcutaneous 3 times a day (with meals)   Insulin Pen Needles, 4mm: 1 application subcutaneously 4 times a day. ** Use with insulin pen **   Lantus Solostar Pen 100 units/mL subcutaneous solution: 16 unit(s) subcutaneous once a day (at bedtime)   melatonin 3 mg oral tablet: 1 tab(s) orally once a day (at bedtime)  metFORMIN 1000 mg oral tablet: 1 tab(s) orally 2 times a day  pantoprazole 40 mg oral delayed release tablet: 1 tab(s) orally once a day (before a meal)  polyethylene glycol 3350 oral powder for reconstitution: 17 gram(s) orally every 12 hours, As Needed for constipation (if no BM x 2 days).   senna oral tablet: 2 tab(s) orally once a day (at bedtime)  sodium chloride 1 g oral tablet: 1 tab(s) orally 2 times a day   Vimpat 100 mg oral tablet: 1 tab(s) orally 2 times a day

## 2022-03-03 NOTE — OCCUPATIONAL THERAPY INITIAL EVALUATION ADULT - MD ORDER
Occupational Therapy (OT) to evaluate and treat. Out of bed to Chair. Ambulate as Tolerated. Ambulate with assistance. Per JUMA Nation, pt is okay to participate in OT evaluation and perform activity as tolerated.

## 2022-03-03 NOTE — SWALLOW BEDSIDE ASSESSMENT ADULT - ADDITIONAL RECOMMENDATIONS
Reconsult department for PO tolerance a needed
Reconsult this department for PO tolerance as needed. If dysphagia symptoms return/persist, patient instructed to follow up at [facility] [phone].

## 2022-03-03 NOTE — PROGRESS NOTE ADULT - PROBLEM SELECTOR PLAN 2
- pt reports pain and difficulty swallowing   - CT showing Retained debris/secretions are seen along the right mid to lower trachea  - Pending bronchoscopy w/ CTS.  - F/u recs re: diet post-procedure. May need SLP re-eval. -Improved.   - CT showing Retained debris/secretions are seen along the right mid to lower trachea  - SLP reeval appreicated. Cont diet as ordered.  - Can use lozenge for symptom.

## 2022-03-03 NOTE — SWALLOW BEDSIDE ASSESSMENT ADULT - SWALLOW EVAL: DIAGNOSIS
1. Functional oral stage for puree, regular solids, mildly thick and thin liquids marked by adequate bolus collection, transfer and posterior transport. 2. Functional pharyngeal phase for aforementioned consistencies marked by a present pharyngeal swallow trigger with hyolaryngeal elevation noted upon digital palpation without evidence of airway penetration/aspiration.

## 2022-03-03 NOTE — DISCHARGE NOTE NURSING/CASE MANAGEMENT/SOCIAL WORK - PATIENT PORTAL LINK FT
You can access the FollowMyHealth Patient Portal offered by [facility] by registering at the following website: [email or web address]. By joining [facility]’s FollowMyHealth portal, you will also be able to view your health information using other applications (apps) compatible with our system.

## 2022-03-03 NOTE — PHYSICAL THERAPY INITIAL EVALUATION ADULT - GENERAL OBSERVATIONS, REHAB EVAL
Upon entry, pt semi-supine in bed in NAD; + continuous pulse oximeter. Pt left in bedside chair with all tubes/lines intact, call bell in reach and in NAD.  JUMA Griggs present at bedside

## 2022-03-03 NOTE — OCCUPATIONAL THERAPY INITIAL EVALUATION ADULT - ADDITIONAL COMMENTS
(See continued from above) CT Neck Soft Tissue with IV Contrast displayed mild soft tissue thickening of the glottis and subglottic larynx, and severe soft tissue thickening of the upper trachea with severe narrowing at the level of the thoracic inlet. Multiple locules of air in the pretracheal/left paratracheal space with tiny amount of fluid, cannot exclude tracheal injury/microperforation. No evidence for rim-enhancing fluid collection to suggest abscess.

## 2022-03-03 NOTE — DISCHARGE NOTE PROVIDER - HOSPITAL COURSE
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     Tracheal stenosis.   - CT neck showing Mild soft tissue thickening of the glottis and subglottic larynx, and severe soft tissue thickening of the upper trachea with severe narrowing at the level of the thoracic inlet. Multiple locules of air in the pretracheal/left paratracheal space with tiny amount of fluid, cannot exclude tracheal injury/microperforation. No evidence for rim-enhancing fluid collection to suggest abscess.  - CT chest Circumferential thickening of the trachea with resultant severe narrowing at the level of thoracic inlet. Exact etiology is unclear, however, primary consideration is post intubation stenosis.  - ENT following - pt is s/p laryngoscope by ENT - Airway widely patent - no significant supralaryngeal swelling  - S/p course of dex. Monitor off steroid.   - F/u blood cx x 2 sets  - Continue IV Unasyn 3g Q6 hours - course TBD based on findings/workup.   - CT showing thoracic inlet tracheal narrowing w/ locules of air in pre/paratracheal space --> CT surgery to eval w/ bronch.  -NPO @ [facility].    Dysphagia.   - pt reports pain and difficulty swallowing   - CT showing Retained debris/secretions are seen along the right mid to lower trachea  - Pending bronchoscopy w/ CTS.  - F/u recs re: diet post-procedure. May need SLP re-eval.    Glioblastoma.   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  - follow up with neurosurgery as OP. Will clarify regimen w/ fam.    Type 2 diabetes mellitus.   - 02/10 A1c 8.7  - pt is on Lantus 16U qhs and premeal insulin  - FS stable and at goal.   - Will continue lantus 10units QHS while in NPO status. Can titrate up when resuming PO intake.  - ISS   - Trend FS.    Need for prophylactic measure.   DVT ppx: Holding lovenox. Pending OR today - resume once ok w/ CTS.     Dispo:    On_________, case was discussed with __________, patient is medically cleared and optimized for discharge today.   All medications were reviewed with attending, and sent to mutually agreed upon pharmacy.   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     Tracheal stenosis- CT neck showing Mild soft tissue thickening of the glottis and subglottic larynx, and severe soft tissue thickening of the upper trachea with severe narrowing at the level of the thoracic inlet. Multiple locules of air in the pretracheal/left paratracheal space with tiny amount of fluid, cannot exclude tracheal injury/microperforation. No evidence for rim-enhancing fluid collection to suggest abscess.  - CT chest Circumferential thickening of the trachea with resultant severe narrowing at the level of thoracic inlet. Exact etiology is unclear, however, primary consideration is post intubation stenosis.  - ENT following - pt is s/p laryngoscope by ENT - Airway widely patent - no significant supralaryngeal swelling  - S/p course of Decadron. Monitor off steroid. Blood cx x 2 -NTD  - treated with IV Unasyn 3g Q6 hours changed to Augmentin BID x 3 more days  - CT showing thoracic inlet tracheal narrowing w/ locules of air in pre/paratracheal space -->CT surgery c/s- 3/2 pt s/p Flex Bronch, balloon dilation, tracheal biopsy and BAL   - F/u w/ CTSx Dr Garland in 3 weeks to f/u on Biopsy results    Dysphagia- pt reports pain and difficulty swallowing   - CT showing Retained debris/secretions are seen along the right mid to lower trachea  - CT surgery c/s- 3/2 pt s/p Flex Bronch, balloon dilation, tracheal biopsy and BAL   - S+S -Regular solids with thin liquids, as tolerated  - F/u w/ CTSx Dr Garland in 3 weeks to f/u on Biopsy results    Glioblastoma- recently dx Stage 4 Glioblastoma s/p craniotomy   - On PO Vimpat 100mg BID for seizure prophylaxis, s/p steroids  - follow up with neurosurgery as OP    Type 2 diabetes mellitus- 02/10 A1c 8.7%, pt is on Lantus 16U qhs and premeal insulin  - FS stable and at goal. ISS. Trend FS    Dispo: On 3/3, case was discussed with Dr Chavarria, patient is medically cleared and optimized for discharge today.   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     Tracheal stenosis- CT neck showing Mild soft tissue thickening of the glottis and subglottic larynx, and severe soft tissue thickening of the upper trachea with severe narrowing at the level of the thoracic inlet. Multiple locules of air in the pretracheal/left paratracheal space with tiny amount of fluid, cannot exclude tracheal injury/microperforation. No evidence for rim-enhancing fluid collection to suggest abscess.  - CT chest Circumferential thickening of the trachea with resultant severe narrowing at the level of thoracic inlet. Exact etiology is unclear, however, primary consideration is post intubation stenosis.  - ENT following - pt is s/p laryngoscope by ENT - Airway widely patent - no significant supralaryngeal swelling  - S/p course of Decadron. Monitor off steroid. Blood cx x 2 -NTD  - treated with IV Unasyn 3g Q6 hours changed to Augmentin BID x 3 more days  - CT showing thoracic inlet tracheal narrowing w/ locules of air in pre/paratracheal space -->CT surgery c/s- 3/2 pt s/p Flex Bronch, balloon dilation, tracheal biopsy and BAL   - F/u w/ CTSx Dr Garland in 3 weeks to f/u on Biopsy results  - 3/3 CXR- The heart is normal in size.The lungs are clear. There is no pneumothorax or pleural effusion. No acute bony abnormalities    Dysphagia- pt reports pain and difficulty swallowing   - CT showing Retained debris/secretions are seen along the right mid to lower trachea  - CT surgery c/s- 3/2 pt s/p Flex Bronch, balloon dilation, tracheal biopsy and BAL   - S+S -Regular solids with thin liquids, as tolerated  - F/u w/ CTSx Dr Garland in 3 weeks to f/u on Biopsy results    Glioblastoma- recently dx Stage 4 Glioblastoma s/p craniotomy   - On PO Vimpat 100mg BID for seizure prophylaxis, s/p steroids  - follow up with neurosurgery as OP    Type 2 diabetes mellitus- 02/10 A1c 8.7%, pt is on Lantus 16U qhs and premeal insulin  - FS stable and at goal. ISS. Trend FS    Dispo: On 3/3, case was discussed with Dr Chavarria, patient is medically cleared and optimized for discharge today.   All medications were reviewed with attending, and sent to mutually agreed upon pharmacy

## 2022-03-03 NOTE — DISCHARGE NOTE PROVIDER - CARE PROVIDER_API CALL
[facility]  [address] [place] [address]  Phone: [phone]  Fax: [phone]  Established Patient  Follow Up Time: 1 week

## 2022-03-03 NOTE — PHYSICAL THERAPY INITIAL EVALUATION ADULT - ADDITIONAL COMMENTS
Pt lives in a private home with his son; there are 3 steps to enter. Pt reports his son provided assistance as needed; however, primarily independent with mobility, self-care, and ADLs. Owns a rolling walker and uses it as needed.

## 2022-03-03 NOTE — PROGRESS NOTE ADULT - PROBLEM SELECTOR PLAN 1
- CT neck showing Mild soft tissue thickening of the glottis and subglottic larynx, and severe soft tissue thickening of the upper trachea with severe narrowing at the level of the thoracic inlet. Multiple locules of air in the pretracheal/left paratracheal space with tiny amount of fluid, cannot exclude tracheal injury/microperforation. No evidence for rim-enhancing fluid collection to suggest abscess.  - CT chest Circumferential thickening of the trachea with resultant severe narrowing at the level of thoracic inlet. Exact etiology is unclear, however, primary consideration is post intubation stenosis.  - ENT following - pt is s/p laryngoscope by ENT - Airway widely patent - no significant supralaryngeal swelling  - S/p course of dex. Monitor off steroid.   - F/u blood cx x 2 sets  - Continue IV Unasyn 3g Q6 hours - course TBD based on findings/workup.   - CT showing thoracic inlet tracheal narrowing w/ locules of air in pre/paratracheal space --> CT surgery to eval w/ bronch.  -NPO @ [facility]. - CT neck showing Mild soft tissue thickening of the glottis and subglottic larynx, and severe soft tissue thickening of the upper trachea with severe narrowing at the level of the thoracic inlet. Multiple locules of air in the pretracheal/left paratracheal space with tiny amount of fluid, cannot exclude tracheal injury/microperforation. No evidence for rim-enhancing fluid collection to suggest abscess.  - CT chest Circumferential thickening of the trachea with resultant severe narrowing at the level of thoracic inlet. Exact etiology is unclear, however, primary consideration is post intubation stenosis.  - ENT following - pt is s/p laryngoscope by ENT - Airway widely patent - no significant supralaryngeal swelling  - S/p course of dex. Monitor off steroid.   - F/u blood cx x 2 sets - NGTD  - Appreciate thoracic recs - s/p balloon dilation, BAL, trach bx.  D/w PA. Ok for Dc pending CXR f/u. Pt will need path f/u as OP.

## 2022-03-03 NOTE — DISCHARGE NOTE PROVIDER - NSDCFUSCHEDAPPT_GEN_ALL_CORE_FT
JORDAN CHAKRABORTY ; 03/09/2022 ; NPP Neurology [address] [facility]  JORDAN CHAKRABORTY ; 03/09/2022 ; NPP Neurosurg [address] [facility]  JORDAN CHAKRABORTY ; 04/14/2022 ; NPP Cardio [address]  JORDAN CHAKRABORTY ; 05/12/2022 ; NPP Endocrin [address]

## 2022-03-03 NOTE — PROGRESS NOTE ADULT - SUBJECTIVE AND OBJECTIVE BOX
[facility] Division of Hospital Medicine  Nehemiah Choudhury) MD Deon  Pager [phone]    SUBJECTIVE:  Chief complaint: tracheal stenosis.    Pt seen and evaluated at bedside this morning. No overnight.     ROS: All systems negative except as noted.      Vital Signs Last 24 Hrs  T(C): 36.7 (02 Mar 2022 13:30), Max: 36.7 (02 Mar 2022 13:30)  T(F): 98 (02 Mar 2022 13:30), Max: 98 (02 Mar 2022 13:30)  HR: 76 (02 Mar 2022 13:30) (67 - 80)  BP: 144/83 (02 Mar 2022 13:30) (103/71 - 144/83)  BP(mean): --  RR: 18 (02 Mar 2022 13:30) (17 - 18)  SpO2: 97% (02 Mar 2022 13:30) (96% - 98%)    PHYSICAL EXAM:  Gen- In bed, comfortable, NAD  Resp- CTAB, good effort. No r/r/w. No accessory muscle use.  CVS- RRR, S1S2, no g/r/m. No LE edema.  GI- Soft abd, NT, ND, +BSx4. No HSM.  MSK- No C/C. ROM intact. No crepitus.    MEDICATION:  MEDICATIONS  (STANDING):  ampicillin/sulbactam  IVPB 3 Gram(s) IV Intermittent every 6 hours  dextrose 40% Gel 15 Gram(s) Oral once  dextrose 5%. 1000 milliLiter(s) (50 mL/Hr) IV Continuous <Continuous>  dextrose 5%. 1000 milliLiter(s) (100 mL/Hr) IV Continuous <Continuous>  dextrose 50% Injectable 25 Gram(s) IV Push once  dextrose 50% Injectable 12.5 Gram(s) IV Push once  dextrose 50% Injectable 25 Gram(s) IV Push once  fluticasone propionate 50 MICROgram(s)/spray Nasal Spray 1 Spray(s) Both Nostrils two times a day  glucagon  Injectable 1 milliGRAM(s) IntraMuscular once  influenza  Vaccine (HIGH DOSE) 0.7 milliLiter(s) IntraMuscular once  insulin lispro (ADMELOG) corrective regimen sliding scale   SubCutaneous every 6 hours  lacosamide IVPB 100 milliGRAM(s) IV Intermittent every 12 hours  lactated ringers. 1000 milliLiter(s) (100 mL/Hr) IV Continuous <Continuous>  pantoprazole  Injectable 40 milliGRAM(s) IV Push daily    MEDICATIONS  (PRN):  acetaminophen     Tablet .. 650 milliGRAM(s) Oral every 6 hours PRN Mild Pain (1 - 3), Moderate Pain (4 - 6)  melatonin 3 milliGRAM(s) Oral at bedtime PRN Insomnia        LABORATORY:                          11.2   6.70  )-----------( 246      ( 02 Mar 2022 07:21 )             35.0     03-02    135  |  96<L>  |  12  ----------------------------<  186<H>  4.0   |  29  |  0.60    Ca    8.6      02 Mar 2022 07:21  Phos  3.5     03-02  Mg     1.70     03-02    TPro  6.4  /  Alb  3.1<L>  /  TBili  0.4  /  DBili  x   /  AST  11  /  ALT  11  /  AlkPhos  70  03-02    PT/INR - ( 02 Mar 2022 07:21 )   PT: 14.0 sec;   INR: 1.20 ratio         PTT - ( 02 Mar 2022 07:21 )  PTT:24.1 sec          COVID-19 PCR: NotDetec (01 Mar 2022 18:06)  COVID-19 PCR: NotDetec (28 Feb 2022 21:32)  COVID-19 PCR: NotDetec (24 Feb 2022 05:30)  COVID-19 PCR: NotDetec (18 Feb 2022 06:50)  COVID-19 PCR: NotDetec (11 Feb 2022 22:10)  COVID-19 PCR: NotDetec (10 Feb 2022 11:57)  COVID-19 PCR: NotDetec (09 Feb 2022 10:20)  COVID-19 PCR: Detected (08 Feb 2022 22:50)  COVID-19 PCR: NotDetec (06 Feb 2022 13:16)  COVID-19 PCR: NotDetec (04 Feb 2022 12:20)  COVID-19 PCR: NotDetec (26 Jan 2022 22:36)  COVID-19 PCR: NotDetec (24 Jan 2022 19:40)                     [facility] Division of Hospital Medicine  Nehemiah Choudhury) MD Deon  Pager [phone]    SUBJECTIVE:  Chief complaint: tracheal stenosis.    Pt seen and evaluated at bedside this morning. No overnight. Throat pain improved. Swallowing w/o issues. No more HA.    ROS: All systems negative except as noted.      Vital Signs Last 24 Hrs  T(C): 36.7 (02 Mar 2022 13:30), Max: 36.7 (02 Mar 2022 13:30)  T(F): 98 (02 Mar 2022 13:30), Max: 98 (02 Mar 2022 13:30)  HR: 76 (02 Mar 2022 13:30) (67 - 80)  BP: 144/83 (02 Mar 2022 13:30) (103/71 - 144/83)  BP(mean): --  RR: 18 (02 Mar 2022 13:30) (17 - 18)  SpO2: 97% (02 Mar 2022 13:30) (96% - 98%)    PHYSICAL EXAM:  Gen- In bed, comfortable, NAD  Resp- CTAB, good effort. No r/r/w. No accessory muscle use.  CVS- RRR, S1S2, no g/r/m. No LE edema.  GI- Soft abd, NT, ND, +BSx4. No HSM.  MSK- No C/C. ROM intact. No crepitus.    MEDICATION:  MEDICATIONS  (STANDING):  amoxicillin  875 milliGRAM(s)/clavulanate 1 Tablet(s) Oral every 12 hours  dextrose 40% Gel 15 Gram(s) Oral once  dextrose 5%. 1000 milliLiter(s) (50 mL/Hr) IV Continuous <Continuous>  dextrose 5%. 1000 milliLiter(s) (100 mL/Hr) IV Continuous <Continuous>  dextrose 50% Injectable 25 Gram(s) IV Push once  dextrose 50% Injectable 12.5 Gram(s) IV Push once  dextrose 50% Injectable 25 Gram(s) IV Push once  fluticasone propionate 50 MICROgram(s)/spray Nasal Spray 1 Spray(s) Both Nostrils two times a day  glucagon  Injectable 1 milliGRAM(s) IntraMuscular once  influenza  Vaccine (HIGH DOSE) 0.7 milliLiter(s) IntraMuscular once  insulin glargine Injectable (LANTUS) 10 Unit(s) SubCutaneous at bedtime  insulin lispro (ADMELOG) corrective regimen sliding scale   SubCutaneous three times a day before meals  insulin lispro (ADMELOG) corrective regimen sliding scale   SubCutaneous at bedtime  lacosamide 100 milliGRAM(s) Oral every 12 hours  pantoprazole    Tablet 40 milliGRAM(s) Oral before breakfast    MEDICATIONS  (PRN):  acetaminophen     Tablet .. 650 milliGRAM(s) Oral every 6 hours PRN Mild Pain (1 - 3), Moderate Pain (4 - 6)  melatonin 3 milliGRAM(s) Oral at bedtime PRN Insomnia      LABORATORY:                          11.5   8.94  )-----------( 225      ( 03 Mar 2022 07:35 )             34.6     03-03    133<L>  |  97<L>  |  12  ----------------------------<  187<H>  4.0   |  26  |  0.65    Ca    8.6      03 Mar 2022 07:35  Phos  2.6     03-03  Mg     1.80     03-03    TPro  6.4  /  Alb  3.1<L>  /  TBili  0.4  /  DBili  x   /  AST  11  /  ALT  11  /  AlkPhos  70  03-02    PT/INR - ( 02 Mar 2022 07:21 )   PT: 14.0 sec;   INR: 1.20 ratio         PTT - ( 02 Mar 2022 07:21 )  PTT:24.1 sec      COVID-19 PCR: NotDetec (01 Mar 2022 18:06)  COVID-19 PCR: NotDetec (28 Feb 2022 21:32)  COVID-19 PCR: NotDetec (24 Feb 2022 05:30)  COVID-19 PCR: NotDetec (18 Feb 2022 06:50)  COVID-19 PCR: NotDetec (11 Feb 2022 22:10)  COVID-19 PCR: NotDetec (10 Feb 2022 11:57)  COVID-19 PCR: NotDetec (09 Feb 2022 10:20)  COVID-19 PCR: Detected (08 Feb 2022 22:50)  COVID-19 PCR: NotDetec (06 Feb 2022 13:16)  COVID-19 PCR: NotDetec (04 Feb 2022 12:20)  COVID-19 PCR: NotDetec (26 Jan 2022 22:36)  COVID-19 PCR: NotDetec (24 Jan 2022 19:40)

## 2022-03-03 NOTE — PROGRESS NOTE ADULT - PROBLEM SELECTOR PLAN 4
- 02/10 A1c 8.7  - pt is on Lantus 16U qhs and premeal insulin  - FS stable and at goal.   - Will continue lantus 10units QHS while in NPO status. Can titrate up when resuming PO intake.  - ISS   - Trend FS. - 02/10 A1c 8.7  - pt is on Lantus 16U qhs and premeal insulin  - FS stable and at goal.   - resume home insulin on DC.  - ISS   - Trend FS.

## 2022-03-03 NOTE — OCCUPATIONAL THERAPY INITIAL EVALUATION ADULT - PERTINENT HX OF CURRENT PROBLEM, REHAB EVAL
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 Pt presented to [facility] on 2/28/22 from outpatient ENT office for "Bilateral vocal cord edema and mucous coming from trachea." (See continued below)

## 2022-03-03 NOTE — SWALLOW BEDSIDE ASSESSMENT ADULT - ASR SWALLOW REFERRAL
ENT consult given patient reporting pain in throat.
ENT at MD's discretion given patient reporting odynophagia and presenting with hoarse/breathy vocal quality.

## 2022-03-03 NOTE — OCCUPATIONAL THERAPY INITIAL EVALUATION ADULT - LIVES WITH, PROFILE
Pt. reports he lives with his son in a house with 3 steps to enter. Once inside, pt. reports bedroom and bathroom are located on the main level. Per pt., he has a bathtub in his bathroom.

## 2022-03-03 NOTE — PHYSICAL THERAPY INITIAL EVALUATION ADULT - PATIENT PROFILE REVIEW, REHAB EVAL
PT initial evaluation received and chart review completed. Pt agreeable to participate in PT evaluation. Pt cleared by JUMA Griggs./yes

## 2022-03-03 NOTE — PROGRESS NOTE ADULT - SUBJECTIVE AND OBJECTIVE BOX
Thoracic Surgery Progress Note    Subjective: Pt is s/p flex Bronch, balloon dilation, tracheal biopsy and BAL. Seen at bedside by team and Dr. Garland. Feeling well and recovering on the floor. Pt did not endorse any chest pain, SOB, fevers/chills, N/V/D/C.    PAST MEDICAL & SURGICAL HISTORY:  Hypertension    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    Type 2 diabetes mellitus    S/P craniotomy         CBC Full  -  ( 03 Mar 2022 07:35 )  WBC Count : 8.94 K/uL  RBC Count : 3.71 M/uL  Hemoglobin : 11.5 g/dL  Hematocrit : 34.6 %  Platelet Count - Automated : 225 K/uL  Mean Cell Volume : 93.3 fL  Mean Cell Hemoglobin : 31.0 pg  Mean Cell Hemoglobin Concentration : 33.2 gm/dL  Auto Neutrophil # : 7.24 K/uL  Auto Lymphocyte # : 1.15 K/uL  Auto Monocyte # : 0.47 K/uL  Auto Eosinophil # : 0.01 K/uL  Auto Basophil # : 0.01 K/uL  Auto Neutrophil % : 80.9 %  Auto Lymphocyte % : 12.9 %  Auto Monocyte % : 5.3 %  Auto Eosinophil % : 0.1 %  Auto Basophil % : 0.1 %    03-03    133<L>  |  97<L>  |  12  ----------------------------<  187<H>  4.0   |  26  |  0.65    Ca    8.6      03 Mar 2022 07:35  Phos  2.6     03-03  Mg     1.80     03-03    TPro  6.4  /  Alb  3.1<L>  /  TBili  0.4  /  DBili  x   /  AST  11  /  ALT  11  /  AlkPhos  70  03-02    PT/INR - ( 02 Mar 2022 07:21 )   PT: 14.0 sec;   INR: 1.20 ratio         PTT - ( 02 Mar 2022 07:21 )  PTT:24.1 sec

## 2022-03-03 NOTE — SWALLOW BEDSIDE ASSESSMENT ADULT - COMMENTS
Per ENT note, patient is a "65 yo M with hx of glioblastoma, htn, dm, hld sent in by outpt ENT Gera Kelsey for CT neck and chest given concern of worsening throat/upper chest pain. Worsening odynophagia, now tolerating puree food. No fevers, WBC 11.2. Scope exam with no significant supralaryngeal swelling, mild weakness of L vocal cord. CT w/ severe soft tissue thickening of the upper trachea with severe narrowing at the level of the thoracic inlet. Multiple locules of air in the pretracheal/left paratracheal space with tiny amount of fluid, cannot exclude tracheal injury/microperforation. No evidence for rim-enhancing fluid collection to suggest abscess."    WBC is elevated.     Patient seen at bedside this AM for an initial assessment of the swallow function, at which time patient was alert and cooperative. Patient is known to this service as patient was seen at [facility] during a recent admission. At that time patient was followed during course of admission and completed a Cinesophagram on 2/16/22 (please see report in PACS), at which time easy to chew with thin liquids via small single sips was recommended. [employer]  utilized (ID#[id number]), however, patient able to make wants/needs known in English. Patient reporting pain in throat when eating/drinking with a pain scale of 6/7(out of 10). Patient noted to have a hoarse/breathy vocal quality.
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  ENT following - pt is s/p laryngoscope by ENT - Airway widely patent - no significant supralaryngeal swelling"    Most recent CT of chest, revealed "Circumferential thickening of the trachea with resultant severe narrowing at the level of thoracic inlet. 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 At that time patient was followed during course of admission and completed a Cinesophagram on 2/16/22 (please see report in PACS), at which time easy to chew with thin liquids via small single sips was recommended.    Patient seen at bedside this AM for a reassessment of the swallow function, at which time patient was alert and cooperative. Patient is known to this service as patient was seen for an initial assessment on 3/1/22 (please see full report for details), at which time regular solids with thin liquids was recommended. [employer]  utilized (ID [id number]). Patient is able to communicate wants/needs with basic English. Patient reporting "a little" pain in throat with a pain scale of 4-5 out of 10. Patient noted to have improved vocal quality in comparison to initial assessment.

## 2022-03-04 LAB
CULTURE RESULTS: SIGNIFICANT CHANGE UP
SPECIMEN SOURCE: SIGNIFICANT CHANGE UP

## 2022-03-06 LAB
CULTURE RESULTS: SIGNIFICANT CHANGE UP
CULTURE RESULTS: SIGNIFICANT CHANGE UP
SPECIMEN SOURCE: SIGNIFICANT CHANGE UP
SPECIMEN SOURCE: SIGNIFICANT CHANGE UP

## 2022-03-08 LAB — SURGICAL PATHOLOGY STUDY: SIGNIFICANT CHANGE UP

## 2022-03-09 ENCOUNTER — APPOINTMENT (OUTPATIENT)
Dept: NEUROLOGY | Facility: CLINIC | Age: 67
End: 2022-03-09
Payer: MEDICARE

## 2022-03-09 ENCOUNTER — APPOINTMENT (OUTPATIENT)
Dept: NEUROSURGERY | Facility: CLINIC | Age: 67
End: 2022-03-09
Payer: MEDICARE

## 2022-03-09 VITALS
HEART RATE: 106 BPM | DIASTOLIC BLOOD PRESSURE: 68 MMHG | OXYGEN SATURATION: 98 % | SYSTOLIC BLOOD PRESSURE: 102 MMHG | HEIGHT: 67 IN | WEIGHT: 152 LBS | BODY MASS INDEX: 23.86 KG/M2 | RESPIRATION RATE: 16 BRPM

## 2022-03-09 PROCEDURE — 99215 OFFICE O/P EST HI 40 MIN: CPT

## 2022-03-09 NOTE — PHYSICAL EXAM
[General Appearance - Alert] : alert [General Appearance - In No Acute Distress] : in no acute distress [Affect] : the affect was normal [Mood] : the mood was normal [Person] : oriented to person [Place] : oriented to place [Remote Intact] : remote memory intact [Span Intact] : the attention span was normal [Concentration Intact] : normal concentrating ability [Visual Intact] : visual attention was ~T not ~L decreased [Naming Objects] : no difficulty naming common objects [Repeating Phrases] : no difficulty repeating a phrase [Fluency] : fluency intact [Comprehension] : comprehension intact [Cranial Nerves Optic (II)] : visual acuity intact bilaterally,  visual fields full to confrontation, pupils equal round and reactive to light [Cranial Nerves Oculomotor (III)] : extraocular motion intact [Cranial Nerves Trigeminal (V)] : facial sensation intact symmetrically [Cranial Nerves Facial (VII)] : face symmetrical [Cranial Nerves Vestibulocochlear (VIII)] : hearing was intact bilaterally [Cranial Nerves Glossopharyngeal (IX)] : tongue and palate midline [Cranial Nerves Accessory (XI - Cranial And Spinal)] : head turning and shoulder shrug symmetric [Cranial Nerves Hypoglossal (XII)] : there was no tongue deviation with protrusion [Motor Tone] : muscle tone was normal in all four extremities [Motor Strength] : muscle strength was normal in all four extremities [Involuntary Movements] : no involuntary movements were seen [No Muscle Atrophy] : normal bulk in all four extremities [Sensation Tactile Decrease] : light touch was intact [Sclera] : the sclera and conjunctiva were normal [Extraocular Movements] : extraocular movements were intact [Oropharynx] : the oropharynx was normal [Respiration, Rhythm And Depth] : normal respiratory rhythm and effort [Edema] : there was no peripheral edema [Skin Color & Pigmentation] : normal skin color and pigmentation [Time] : disoriented to time [Short Term Intact] : short term memory impaired [Registration Intact] : recent registration memory impaired [Dysdiadochokinesia Bilaterally] : not present [Coordination - Dysmetria Impaired Finger-to-Nose Bilateral] : not present [FreeTextEntry4] : oriented to time with prompting [FreeTextEntry8] : mild imbalance  [FreeTextEntry1] : incision site well healed

## 2022-03-09 NOTE — REASON FOR VISIT
[Family Member] : family member [de-identified] : L frontal crani tumor resection (gliosarcoma) [de-identified] : 1/27/22

## 2022-03-09 NOTE — DISCUSSION/SUMMARY
[FreeTextEntry1] : Brain tumor - I recommend chemoradiation therapy.\par \par PT EDUCATION – We discussed the natural history of the disease, including the incidence, risk factors, factors predicting prognosis, diagnostic methods, available treatments, anticipated risks/benefits, and the prognosis of the disease. I explained that overall survival would be modestly improved, but quality of life and chance for long term (i.e., greater than two year) survival would be significantly improved. \par \par We discussed the overall treatment plan in broad strokes, cautioning that today there would be a lot of information and another visit for chemotherapy teaching would be conducted to review the “nuts & bolts” of treatment prior to the start of radiation therapy. This overview included a plan for 6 weeks of radiation with concurrent temozolomide chemotherapy.\par \par The patient identified the health care proxy as his daughters.\par \par We also discussed treatment with Optune, which would begin 1 month after completion of chemoradiation and be used in conjunction with monthly chemotherapy cycles. Pt education materials provided. They wish to proceed and Rx was submitted. \par \par DISPO - All questions answered. To follow-up for chemo teaching.\par \par

## 2022-03-09 NOTE — ASSESSMENT
[FreeTextEntry1] : 68 yo now over five weeks s/p resection of a L frontal gliosarcoma. We are in agreement w/ Dr. Hill that he needs to begin adjuvant treatment in the next several weeks. Dr. Guevara to see pt this upcoming Monday. Dr. Hill also discussed Optune and we opined that he is a good candidate. \par \par Will order an MR +/- to be done in next several weeks to facilitate planning.

## 2022-03-09 NOTE — HISTORY OF PRESENT ILLNESS
[FreeTextEntry1] : This is a pleasant 68 yo RH man with PMHx HTN and DM, now with glioblastoma, presenting for opinion regarding neuro-oncological care.\par \par He recently (2/28/22) was seen for throat pain/difficulty swallowing. ED notes indicate, "Had NGtube during his stay that removed before he was discharged. Since the removal he has been experiencing throat pain and upper chest pain. Seen by ENT outpt and was noted to have vocal cord swelling and or irritation and sent in for CT. Having difficulty swallowing. Eating pureed food and still having difficulty swallowing due to pain."\par \par ONCOLOGIC HISTORY\par 1/7/2022 - presented to [facility] while visiting [place] with headaches and cognitive changes and was diagnosed with brain tumor\par 1/24/2022 - presented to [facility] ED with complaints of confused speech (only states name to all questions asked)\par 1/27/2022 - s/p resection of LEFT frontal enhancing tumor with GLEOLAN placed.\par PATH: Gliosarcoma, no ATRX mutation on immunostains, IDH-1 negative.\par 2/4/2022 - COVID testing NEGATIVE\par 2/8/2022 - d/c from [facility] to [place] [facility] on salt tablets, insulin, and decadron \par 2/9/2022 - COVID POSITIVE, but thought to be FALSE positive as others negative\par 2/10/2022 - staples removed.\par 2/24/2022 - saw ENT for hoarse voice ( Dr. 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 \par He is now able to eat a regular diet. No n/v. Some mild headaches, taking Tylenol daily. \par Gait mildly unsteady, but he is able to ambulate without assistive devices and no falls. \par Short term memory is impaired and he has occasional word-finding difficulty. \par They report he is very fatigued and deconditioned. \par \par He is a retired . Lives with his wife and son, two daughters live nearby. \par \par He is [place]an speaking, understands English and speaks basic English. They decline the  phone and prefer his daughter to translate. \par \par KPS 80. \par \par

## 2022-03-09 NOTE — HISTORY OF PRESENT ILLNESS
[FreeTextEntry1] : 68 yo who presented w/ AMS and dx brain mass while in [place] Presented to [facility] upon return to [place]; ultimately underwent near-total L frontal resection of what ultimately returned IDH WT gliosarcoma. Discharged to [facility], returned home 2/24. This is his first outpatient visit, seeing us and Dr. Hill both today.\par \par He and family both feel that he is significantly improved c/t pre-op and closer to his baseline. He has no obvious deficits and is able to attend to basic needs unassisted. He is living in [place] with two of his adult children. He has poor endurance at present and some balance issues when he is not focused on the path in front of him. He does not require an assist device at this time. \par \par He has had no issues with the incision.

## 2022-03-09 NOTE — PHYSICAL EXAM
[FreeTextEntry1] : Primarily [place] speaking, his daughters do some translation but he answers limited questions in English\par Ox3\par PERRL 4->3 7 EOMI OU\par FS/TM\par No pronator drift\par Incision well healed w/o erythema, fluctuance, breakdown

## 2022-03-10 ENCOUNTER — TRANSCRIPTION ENCOUNTER (OUTPATIENT)
Age: 67
End: 2022-03-10

## 2022-03-11 ENCOUNTER — RESULT REVIEW (OUTPATIENT)
Age: 67
End: 2022-03-11

## 2022-03-11 ENCOUNTER — INPATIENT (INPATIENT)
Facility: HOSPITAL | Age: 67
LOS: 10 days | Discharge: HOME HEALTH | End: 2022-03-22
Attending: STUDENT IN AN ORGANIZED HEALTH CARE EDUCATION/TRAINING PROGRAM | Admitting: STUDENT IN AN ORGANIZED HEALTH CARE EDUCATION/TRAINING PROGRAM
Payer: MEDICARE

## 2022-03-11 VITALS
SYSTOLIC BLOOD PRESSURE: 133 MMHG | DIASTOLIC BLOOD PRESSURE: 59 MMHG | RESPIRATION RATE: 24 BRPM | OXYGEN SATURATION: 98 % | HEIGHT: 67 IN | TEMPERATURE: 98 F | HEART RATE: 101 BPM

## 2022-03-11 DIAGNOSIS — R06.1 STRIDOR: ICD-10-CM

## 2022-03-11 DIAGNOSIS — R06.89 OTHER ABNORMALITIES OF BREATHING: ICD-10-CM

## 2022-03-11 DIAGNOSIS — Z98.890 OTHER SPECIFIED POSTPROCEDURAL STATES: Chronic | ICD-10-CM

## 2022-03-11 LAB
ALBUMIN SERPL ELPH-MCNC: 3.3 G/DL — SIGNIFICANT CHANGE UP (ref 3.3–5)
ALP SERPL-CCNC: 100 U/L — SIGNIFICANT CHANGE UP (ref 40–120)
ALT FLD-CCNC: 20 U/L — SIGNIFICANT CHANGE UP (ref 4–41)
ANION GAP SERPL CALC-SCNC: 12 MMOL/L — SIGNIFICANT CHANGE UP (ref 7–14)
APTT BLD: 28.6 SEC — SIGNIFICANT CHANGE UP (ref 27–36.3)
AST SERPL-CCNC: 23 U/L — SIGNIFICANT CHANGE UP (ref 4–40)
B PERT DNA SPEC QL NAA+PROBE: SIGNIFICANT CHANGE UP
B PERT+PARAPERT DNA PNL SPEC NAA+PROBE: SIGNIFICANT CHANGE UP
BASE EXCESS BLDV CALC-SCNC: 4.4 MMOL/L — HIGH (ref -2–3)
BASOPHILS # BLD AUTO: 0.02 K/UL — SIGNIFICANT CHANGE UP (ref 0–0.2)
BASOPHILS NFR BLD AUTO: 0.2 % — SIGNIFICANT CHANGE UP (ref 0–2)
BILIRUB SERPL-MCNC: 0.6 MG/DL — SIGNIFICANT CHANGE UP (ref 0.2–1.2)
BLD GP AB SCN SERPL QL: NEGATIVE — SIGNIFICANT CHANGE UP
BLOOD GAS VENOUS COMPREHENSIVE RESULT: SIGNIFICANT CHANGE UP
BORDETELLA PARAPERTUSSIS (RAPRVP): SIGNIFICANT CHANGE UP
BUN SERPL-MCNC: 7 MG/DL — SIGNIFICANT CHANGE UP (ref 7–23)
C PNEUM DNA SPEC QL NAA+PROBE: SIGNIFICANT CHANGE UP
CALCIUM SERPL-MCNC: 9 MG/DL — SIGNIFICANT CHANGE UP (ref 8.4–10.5)
CHLORIDE BLDV-SCNC: 97 MMOL/L — SIGNIFICANT CHANGE UP (ref 96–108)
CHLORIDE SERPL-SCNC: 97 MMOL/L — LOW (ref 98–107)
CO2 BLDV-SCNC: 31.2 MMOL/L — HIGH (ref 22–26)
CO2 SERPL-SCNC: 26 MMOL/L — SIGNIFICANT CHANGE UP (ref 22–31)
CREAT SERPL-MCNC: 0.51 MG/DL — SIGNIFICANT CHANGE UP (ref 0.5–1.3)
EGFR: 112 ML/MIN/1.73M2 — SIGNIFICANT CHANGE UP
EOSINOPHIL # BLD AUTO: 0.45 K/UL — SIGNIFICANT CHANGE UP (ref 0–0.5)
EOSINOPHIL NFR BLD AUTO: 4.7 % — SIGNIFICANT CHANGE UP (ref 0–6)
FLUAV SUBTYP SPEC NAA+PROBE: SIGNIFICANT CHANGE UP
FLUBV RNA SPEC QL NAA+PROBE: SIGNIFICANT CHANGE UP
GAS PNL BLDV: 133 MMOL/L — LOW (ref 136–145)
GLUCOSE BLDC GLUCOMTR-MCNC: 134 MG/DL — HIGH (ref 70–99)
GLUCOSE BLDC GLUCOMTR-MCNC: 197 MG/DL — HIGH (ref 70–99)
GLUCOSE BLDC GLUCOMTR-MCNC: 278 MG/DL — HIGH (ref 70–99)
GLUCOSE BLDC GLUCOMTR-MCNC: 321 MG/DL — HIGH (ref 70–99)
GLUCOSE BLDV-MCNC: 132 MG/DL — HIGH (ref 70–99)
GLUCOSE SERPL-MCNC: 136 MG/DL — HIGH (ref 70–99)
HADV DNA SPEC QL NAA+PROBE: SIGNIFICANT CHANGE UP
HCO3 BLDV-SCNC: 30 MMOL/L — HIGH (ref 22–29)
HCOV 229E RNA SPEC QL NAA+PROBE: SIGNIFICANT CHANGE UP
HCOV HKU1 RNA SPEC QL NAA+PROBE: SIGNIFICANT CHANGE UP
HCOV NL63 RNA SPEC QL NAA+PROBE: SIGNIFICANT CHANGE UP
HCOV OC43 RNA SPEC QL NAA+PROBE: SIGNIFICANT CHANGE UP
HCT VFR BLD CALC: 36.5 % — LOW (ref 39–50)
HCT VFR BLDA CALC: 37 % — LOW (ref 39–51)
HGB BLD CALC-MCNC: 12.2 G/DL — LOW (ref 13–17)
HGB BLD-MCNC: 11.7 G/DL — LOW (ref 13–17)
HMPV RNA SPEC QL NAA+PROBE: SIGNIFICANT CHANGE UP
HPIV1 RNA SPEC QL NAA+PROBE: SIGNIFICANT CHANGE UP
HPIV2 RNA SPEC QL NAA+PROBE: SIGNIFICANT CHANGE UP
HPIV3 RNA SPEC QL NAA+PROBE: SIGNIFICANT CHANGE UP
HPIV4 RNA SPEC QL NAA+PROBE: SIGNIFICANT CHANGE UP
IANC: 7.04 K/UL — SIGNIFICANT CHANGE UP (ref 1.5–8.5)
IMM GRANULOCYTES NFR BLD AUTO: 0.4 % — SIGNIFICANT CHANGE UP (ref 0–1.5)
INR BLD: 1.27 RATIO — HIGH (ref 0.88–1.16)
LACTATE BLDV-MCNC: 2 MMOL/L — SIGNIFICANT CHANGE UP (ref 0.5–2)
LYMPHOCYTES # BLD AUTO: 1.25 K/UL — SIGNIFICANT CHANGE UP (ref 1–3.3)
LYMPHOCYTES # BLD AUTO: 13.1 % — SIGNIFICANT CHANGE UP (ref 13–44)
M PNEUMO DNA SPEC QL NAA+PROBE: SIGNIFICANT CHANGE UP
MCHC RBC-ENTMCNC: 30.3 PG — SIGNIFICANT CHANGE UP (ref 27–34)
MCHC RBC-ENTMCNC: 32.1 GM/DL — SIGNIFICANT CHANGE UP (ref 32–36)
MCV RBC AUTO: 94.6 FL — SIGNIFICANT CHANGE UP (ref 80–100)
MONOCYTES # BLD AUTO: 0.73 K/UL — SIGNIFICANT CHANGE UP (ref 0–0.9)
MONOCYTES NFR BLD AUTO: 7.7 % — SIGNIFICANT CHANGE UP (ref 2–14)
NEUTROPHILS # BLD AUTO: 7.04 K/UL — SIGNIFICANT CHANGE UP (ref 1.8–7.4)
NEUTROPHILS NFR BLD AUTO: 73.9 % — SIGNIFICANT CHANGE UP (ref 43–77)
NRBC # BLD: 0 /100 WBCS — SIGNIFICANT CHANGE UP
NRBC # FLD: 0 K/UL — SIGNIFICANT CHANGE UP
PCO2 BLDV: 47 MMHG — SIGNIFICANT CHANGE UP (ref 42–55)
PH BLDV: 7.41 — SIGNIFICANT CHANGE UP (ref 7.32–7.43)
PLATELET # BLD AUTO: 144 K/UL — LOW (ref 150–400)
PO2 BLDV: 72 MMHG — SIGNIFICANT CHANGE UP
POTASSIUM BLDV-SCNC: 3.5 MMOL/L — SIGNIFICANT CHANGE UP (ref 3.5–5.1)
POTASSIUM SERPL-MCNC: 3.9 MMOL/L — SIGNIFICANT CHANGE UP (ref 3.5–5.3)
POTASSIUM SERPL-SCNC: 3.9 MMOL/L — SIGNIFICANT CHANGE UP (ref 3.5–5.3)
PROT SERPL-MCNC: 6.6 G/DL — SIGNIFICANT CHANGE UP (ref 6–8.3)
PROTHROM AB SERPL-ACNC: 14.8 SEC — HIGH (ref 10.5–13.4)
RAPID RVP RESULT: SIGNIFICANT CHANGE UP
RBC # BLD: 3.86 M/UL — LOW (ref 4.2–5.8)
RBC # FLD: 13.3 % — SIGNIFICANT CHANGE UP (ref 10.3–14.5)
RH IG SCN BLD-IMP: POSITIVE — SIGNIFICANT CHANGE UP
RSV RNA SPEC QL NAA+PROBE: SIGNIFICANT CHANGE UP
RV+EV RNA SPEC QL NAA+PROBE: SIGNIFICANT CHANGE UP
SAO2 % BLDV: 94.5 % — SIGNIFICANT CHANGE UP
SARS-COV-2 RNA SPEC QL NAA+PROBE: SIGNIFICANT CHANGE UP
SODIUM SERPL-SCNC: 135 MMOL/L — SIGNIFICANT CHANGE UP (ref 135–145)
WBC # BLD: 9.53 K/UL — SIGNIFICANT CHANGE UP (ref 3.8–10.5)
WBC # FLD AUTO: 9.53 K/UL — SIGNIFICANT CHANGE UP (ref 3.8–10.5)

## 2022-03-11 PROCEDURE — 99284 EMERGENCY DEPT VISIT MOD MDM: CPT

## 2022-03-11 PROCEDURE — 99283 EMERGENCY DEPT VISIT LOW MDM: CPT

## 2022-03-11 PROCEDURE — 99233 SBSQ HOSP IP/OBS HIGH 50: CPT

## 2022-03-11 PROCEDURE — 31622 DX BRONCHOSCOPE/WASH: CPT

## 2022-03-11 PROCEDURE — 88305 TISSUE EXAM BY PATHOLOGIST: CPT | Mod: 26

## 2022-03-11 PROCEDURE — 71045 X-RAY EXAM CHEST 1 VIEW: CPT | Mod: 26

## 2022-03-11 DEVICE — FIBER SLIMLINE EZ SIS 365UM: Type: IMPLANTABLE DEVICE | Status: FUNCTIONAL

## 2022-03-11 DEVICE — GWIRE ZEBRA 038/150 ANG: Type: IMPLANTABLE DEVICE | Status: FUNCTIONAL

## 2022-03-11 RX ORDER — PANTOPRAZOLE SODIUM 20 MG/1
40 TABLET, DELAYED RELEASE ORAL
Refills: 0 | Status: DISCONTINUED | OUTPATIENT
Start: 2022-03-11 | End: 2022-03-11

## 2022-03-11 RX ORDER — LACOSAMIDE 50 MG/1
100 TABLET ORAL EVERY 12 HOURS
Refills: 0 | Status: DISCONTINUED | OUTPATIENT
Start: 2022-03-11 | End: 2022-03-14

## 2022-03-11 RX ORDER — ESCITALOPRAM OXALATE 10 MG/1
5 TABLET, FILM COATED ORAL DAILY
Refills: 0 | Status: DISCONTINUED | OUTPATIENT
Start: 2022-03-11 | End: 2022-03-22

## 2022-03-11 RX ORDER — DEXTROSE 50 % IN WATER 50 %
25 SYRINGE (ML) INTRAVENOUS ONCE
Refills: 0 | Status: DISCONTINUED | OUTPATIENT
Start: 2022-03-11 | End: 2022-03-16

## 2022-03-11 RX ORDER — SODIUM CHLORIDE 9 MG/ML
1000 INJECTION, SOLUTION INTRAVENOUS
Refills: 0 | Status: DISCONTINUED | OUTPATIENT
Start: 2022-03-11 | End: 2022-03-16

## 2022-03-11 RX ORDER — INFLUENZA VIRUS VACCINE 15; 15; 15; 15 UG/.5ML; UG/.5ML; UG/.5ML; UG/.5ML
0.7 SUSPENSION INTRAMUSCULAR ONCE
Refills: 0 | Status: DISCONTINUED | OUTPATIENT
Start: 2022-03-11 | End: 2022-03-16

## 2022-03-11 RX ORDER — HEPARIN SODIUM 5000 [USP'U]/ML
5000 INJECTION INTRAVENOUS; SUBCUTANEOUS EVERY 8 HOURS
Refills: 0 | Status: DISCONTINUED | OUTPATIENT
Start: 2022-03-11 | End: 2022-03-22

## 2022-03-11 RX ORDER — PANTOPRAZOLE SODIUM 20 MG/1
40 TABLET, DELAYED RELEASE ORAL DAILY
Refills: 0 | Status: DISCONTINUED | OUTPATIENT
Start: 2022-03-11 | End: 2022-03-18

## 2022-03-11 RX ORDER — SODIUM CHLORIDE 9 MG/ML
1 INJECTION INTRAMUSCULAR; INTRAVENOUS; SUBCUTANEOUS
Refills: 0 | Status: DISCONTINUED | OUTPATIENT
Start: 2022-03-11 | End: 2022-03-22

## 2022-03-11 RX ORDER — ACETAMINOPHEN 500 MG
1000 TABLET ORAL ONCE
Refills: 0 | Status: COMPLETED | OUTPATIENT
Start: 2022-03-11 | End: 2022-03-11

## 2022-03-11 RX ORDER — FAMOTIDINE 10 MG/ML
20 INJECTION INTRAVENOUS EVERY 12 HOURS
Refills: 0 | Status: DISCONTINUED | OUTPATIENT
Start: 2022-03-11 | End: 2022-03-11

## 2022-03-11 RX ORDER — FLUTICASONE PROPIONATE 50 MCG
1 SPRAY, SUSPENSION NASAL
Refills: 0 | Status: DISCONTINUED | OUTPATIENT
Start: 2022-03-11 | End: 2022-03-22

## 2022-03-11 RX ORDER — DEXTROSE 50 % IN WATER 50 %
12.5 SYRINGE (ML) INTRAVENOUS ONCE
Refills: 0 | Status: DISCONTINUED | OUTPATIENT
Start: 2022-03-11 | End: 2022-03-22

## 2022-03-11 RX ORDER — INSULIN LISPRO 100/ML
VIAL (ML) SUBCUTANEOUS EVERY 6 HOURS
Refills: 0 | Status: DISCONTINUED | OUTPATIENT
Start: 2022-03-11 | End: 2022-03-14

## 2022-03-11 RX ORDER — DEXTROSE 50 % IN WATER 50 %
15 SYRINGE (ML) INTRAVENOUS ONCE
Refills: 0 | Status: DISCONTINUED | OUTPATIENT
Start: 2022-03-11 | End: 2022-03-16

## 2022-03-11 RX ORDER — DEXTROSE MONOHYDRATE, SODIUM CHLORIDE, AND POTASSIUM CHLORIDE 50; .745; 4.5 G/1000ML; G/1000ML; G/1000ML
1000 INJECTION, SOLUTION INTRAVENOUS
Refills: 0 | Status: DISCONTINUED | OUTPATIENT
Start: 2022-03-11 | End: 2022-03-11

## 2022-03-11 RX ORDER — SODIUM CHLORIDE 9 MG/ML
1000 INJECTION, SOLUTION INTRAVENOUS
Refills: 0 | Status: DISCONTINUED | OUTPATIENT
Start: 2022-03-11 | End: 2022-03-12

## 2022-03-11 RX ORDER — LACOSAMIDE 50 MG/1
100 TABLET ORAL
Refills: 0 | Status: DISCONTINUED | OUTPATIENT
Start: 2022-03-11 | End: 2022-03-11

## 2022-03-11 RX ORDER — INSULIN GLARGINE 100 [IU]/ML
8 INJECTION, SOLUTION SUBCUTANEOUS AT BEDTIME
Refills: 0 | Status: DISCONTINUED | OUTPATIENT
Start: 2022-03-11 | End: 2022-03-14

## 2022-03-11 RX ORDER — GABAPENTIN 400 MG/1
100 CAPSULE ORAL AT BEDTIME
Refills: 0 | Status: DISCONTINUED | OUTPATIENT
Start: 2022-03-11 | End: 2022-03-22

## 2022-03-11 RX ADMIN — Medication 6: at 23:38

## 2022-03-11 RX ADMIN — Medication 400 MILLIGRAM(S): at 12:13

## 2022-03-11 RX ADMIN — HEPARIN SODIUM 5000 UNIT(S): 5000 INJECTION INTRAVENOUS; SUBCUTANEOUS at 23:37

## 2022-03-11 RX ADMIN — FAMOTIDINE 20 MILLIGRAM(S): 10 INJECTION INTRAVENOUS at 17:01

## 2022-03-11 RX ADMIN — INSULIN GLARGINE 8 UNIT(S): 100 INJECTION, SOLUTION SUBCUTANEOUS at 23:37

## 2022-03-11 RX ADMIN — DEXTROSE MONOHYDRATE, SODIUM CHLORIDE, AND POTASSIUM CHLORIDE 75 MILLILITER(S): 50; .745; 4.5 INJECTION, SOLUTION INTRAVENOUS at 17:55

## 2022-03-11 RX ADMIN — Medication 1 SPRAY(S): at 17:01

## 2022-03-11 NOTE — H&P ADULT - HISTORY OF PRESENT ILLNESS
· HPI Objective Statement: 68 y/o M with PMHx of recently dx Grade 4 glioblastoma s/p craniotomy, HTN, T2DM, and recent hospitalization at [facility] for tracheal stenosis p/w severe throat pain, difficulty breathing, and productive cough. Pt discharged on 3/3 after tracheal balloon dilation and biopsy. 4 days ago (5 days post-discharge), pt started having severe 10/10 throat pain, worst under the chin but extending down to the rest of the throat, and having difficulty breathing. Throat pain is worsened with swallowing. Also endorses cough productive of yellow sputum, which has been keeping pt up at night. Denies fever, chills, chest pain, abdominal pain, N/V. Has been tolerating PO intake.  Pt had on 3/2/22 a Bronch, balloon dilation and tracheal biopsy with Dr Garland. Post procedure pt's symptoms improved and pt was eventually discharged to home by medical service with plans for outpt follow up with DR Goldman. OR pathology showed no malignancy. Today pt presents to [facility] ER with worsening cough, SOB, and neck pain x 4 days. Pt seen with DR Garland. Pt has audible inspiratory stridor with some SOB during exam. Daughter and bedside who confirms symptoms. ASsessment done with Dr. Garland speaking to pt and pt's daughter in Occitan. Pt looks to be in mild resp distress. On exam coarse wheezing heard throughout. Pt to be admitted to Thoracic surgery, CTICU with plans for urgent add on Bronchoscopy today with DR Chavez or DR. Garland.

## 2022-03-11 NOTE — ED PROVIDER NOTE - ATTENDING CONTRIBUTION TO CARE
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 Pt discharged on 3/3 after tracheal balloon dilation and biopsy. 4 days ago (5 days post-discharge), pt started having severe 10/10 throat pain, worst under the chin but extending down to the rest of the throat, and having difficulty breathing. Throat pain is worsened with swallowing. Also endorses cough productive of yellow sputum, which has been keeping pt up at night. Denies fever, chills, chest pain, abdominal pain, N/V. 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 Pt discharged on 3/3 after tracheal balloon dilation and biopsy. 4 days ago (5 days post-discharge), pt started having severe 10/10 throat pain, worst under the chin but extending down to the rest of the throat, and having difficulty breathing. Throat pain is worsened with swallowing. Also endorses cough productive of yellow sputum, which has been keeping pt up at night. Denies fever, chills, chest pain, abdominal pain, N/V. Has been tolerating PO intake."    PE: well appearing; no resp distress but stridorous (unknown if since procedure or not); pulse ox 100% on RA: put on 2L for comfort; occasionally has gasping episodes; CTAB/L; s1 s2 no m/r/g abd soft/NT/ND    Imp: rectal temp 99, will get CXR, labs, CT surgery consult as know pt well given recent tracheal dilation procedure

## 2022-03-11 NOTE — ED ADULT TRIAGE NOTE - CHIEF COMPLAINT QUOTE
d/ed j 3/4 for balloon dilation  trachea. daughter reports increased diff breathing  x 4 days  with stridor,pain to neck  area pmh- glioblastomy, dm   fs 188.

## 2022-03-11 NOTE — PROGRESS NOTE ADULT - SUBJECTIVE AND OBJECTIVE BOX
CHIEF COMPLAINT: FOLLOW UP IN ICU FOR POSTOPERATIVE CARE OF PATIENT WHO IS S/P              PROCEDURES:                ISSUES:                INTERVAL EVENTS:      s/p bronchoscopy in OR with soft tissue              HISTORY:      Says breathing is okay. complained of throat pain         PHYSICAL EXAM:      Gen: Comfortable, No acute distress     Eyes: Sclera white, Conjunctiva normal, Eyelids normal, Pupils symmetrical      ENT: Mucous membranes moist,  ,  ,       Neck: Trachea midline,  ,  ,  ,  ,  ,       CV: Rate regular, Rhythm regular,  ,  ,       Resp: Breath sounds clear, No accessory muscles use, ,       Abd: Soft, Non-distended, Non-tender, Bowel sounds normal,  ,  ,       Skin: Warm, No peripheral edema of lower extremities,  ,       : No gonzalez     Neuro: Moving all 4 extremities,       Psych: A&Ox3               ASSESSMENT AND PLAN:           NEURO:     Post-operative Pain - Tylenol IV PRN.             RESPIRATORY:   Hypoxia - Humidified O2. Wean nasal cannula for goal O2sat above 92. Obtain CXR. Incentive spirometry. Chest PT and frequent suctioning. Continue bronchodilators. OOB to chair & ambulate w/ assistance. Continuous pulse oximetry for support & to prevent decompensation.     Heliox on stand by if needed. There is soft tissue swelling/ granulation tissue where anterior cricoid cartilage is necrotic however airway is patent and not completely occluded by this tissue. Should be intubatable. Cricothyroidotomy and trach kit at bedside    Keep HOB elevated, monitor for stridor.             CARDIOVASCULAR:     Hemodynamically stable - Not on pressors. Continue hemodynamic monitoring.     Telemetry (medical test) - Reviewed by me today independently. Normal sinus rhythm.             RENAL:     Stable - Monitor IOs and electrolytes. Keep K above 4.0 and Mg above 2.0.             GASTROINTESTINAL:     GI prophylaxis not indicated     Zofran and Reglan IV PRN for nausea     NPO for now given stridor and in ICU for airway monitoring. Can start diet tomorrow if stable from airway standpoint            HEMATOLOGIC:     No signs of active bleeding. Monitor Hgb in CBC in AM     DVT prophylaxis with heparin subQ and SCDs.             INFECTIOUS DISEASE:     All surgical sites appear clean. No signs of active infection. Will monitor for fever and leukocytosis.           ENDOCRINE:     Stable – Monitor glucose fingersticks for goal 120-180.           Pertinent clinical, laboratory, radiographic, hemodynamic, echocardiographic, respiratory data, microbiologic data and chart were reviewed by myself and analyzed frequently throughout the course of the day and night by myself.     Plan discussed at length with the CTICU staff and Attending CT Surgeon -   Dr Trevin Chavez    Patient's status was discussed with patient at bedside.     _________________________  VITAL SIGNS:  Vital Signs Last 24 Hrs  T(C): 35 (11 Mar 2022 14:45), Max: 37.7 (11 Mar 2022 10:57)  T(F): 95 (11 Mar 2022 14:45), Max: 99.8 (11 Mar 2022 10:57)  HR: 96 (11 Mar 2022 15:00) (89 - 101)  BP: 100/55 (11 Mar 2022 15:00) (100/55 - 139/79)  BP(mean): 85 (11 Mar 2022 15:00) (72 - 96)  RR: 18 (11 Mar 2022 15:00) (16 - 24)  SpO2: 99% (11 Mar 2022 15:00) (98% - 100%)  I/Os:   I&O's Detail    11 Mar 2022 07:01  -  11 Mar 2022 15:28  --------------------------------------------------------  IN:    dextrose 5% + sodium chloride 0.45% w/ Additives: 150 mL  Total IN: 150 mL    OUT:  Total OUT: 0 mL    Total NET: 150 mL              MEDICATIONS:  MEDICATIONS  (STANDING):  dextrose 40% Gel 15 Gram(s) Oral once  dextrose 5% + sodium chloride 0.45% with potassium chloride 20 mEq/L 1000 milliLiter(s) (75 mL/Hr) IV Continuous <Continuous>  dextrose 5%. 1000 milliLiter(s) (50 mL/Hr) IV Continuous <Continuous>  dextrose 5%. 1000 milliLiter(s) (100 mL/Hr) IV Continuous <Continuous>  dextrose 50% Injectable 25 Gram(s) IV Push once  dextrose 50% Injectable 12.5 Gram(s) IV Push once  escitalopram 5 milliGRAM(s) Oral daily  famotidine Injectable 20 milliGRAM(s) IV Push every 12 hours  fluticasone propionate 50 MICROgram(s)/spray Nasal Spray 1 Spray(s) Both Nostrils two times a day  gabapentin 100 milliGRAM(s) Oral at bedtime  heparin   Injectable 5000 Unit(s) SubCutaneous every 8 hours  insulin glargine Injectable (LANTUS) 8 Unit(s) SubCutaneous at bedtime  insulin lispro (ADMELOG) corrective regimen sliding scale   SubCutaneous every 6 hours  lacosamide 100 milliGRAM(s) Oral two times a day  pantoprazole    Tablet 40 milliGRAM(s) Oral before breakfast    MEDICATIONS  (PRN):      LABS:  Laboratory data was independently reviewed by me today.                           11.7   9.53  )-----------( 144      ( 11 Mar 2022 11:10 )             36.5     03-11    135  |  97<L>  |  7   ----------------------------<  136<H>  3.9   |  26  |  0.51    Ca    9.0      11 Mar 2022 11:10    TPro  6.6  /  Alb  3.3  /  TBili  0.6  /  DBili  x   /  AST  23  /  ALT  20  /  AlkPhos  100  03-11    LIVER FUNCTIONS - ( 11 Mar 2022 11:10 )  Alb: 3.3 g/dL / Pro: 6.6 g/dL / ALK PHOS: 100 U/L / ALT: 20 U/L / AST: 23 U/L / GGT: x           PT/INR - ( 11 Mar 2022 12:06 )   PT: 14.8 sec;   INR: 1.27 ratio         PTT - ( 11 Mar 2022 12:06 )  PTT:28.6 sec        RADIOLOGY:   Radiology images were independently reviewed by me today. Reports were reviewed by me today.    Xray Chest 1 View- PORTABLE-Urgent:   ACC: [id number] EXAM:  XR CHEST PORTABLE URGENT 1V                          PROCEDURE DATE:  03/11/2022          INTERPRETATION:  CLINICAL INFORMATION: Dyspnea    Time of Exam:  March 11, 2022 at 12:04 PM    EXAM:  Frontal Chest    FINDINGS:  Both lungs are equally aerated and free of any focal abnormalities. The   heart is not enlarged and there is no effusion.        COMPARISON:  March 3, 2022        IMPRESSION:  No acute pulmonary disease.    --- End of Report ---            JOANNE BENNETT MD; Attending Radiologist  This document has been electronically signed. Mar 11 2022 12:15PM (03-11-22 @ 12:05)  Xray Chest 1 View- PORTABLE-Urgent:   ACC: [id number] EXAM:  XR CHEST PORTABLE URGENT 1V                          PROCEDURE DATE:  03/03/2022          INTERPRETATION:  EXAMINATION: XR CHEST URGENT    CLINICAL INDICATION: Postop status post tracheal dilatation.    TECHNIQUE: Single frontal, portable view of the chest was obtained.    COMPARISON: Chest radiograph 2/9/2022, CT chest 2/20/2022.    FINDINGS:    The heart is normal in size.  The lungs are clear.  There is no pneumothorax or pleural effusion.  No acute bony abnormalities.    IMPRESSION:  Clear lungs.    --- End of Report ---          LEONARDO SUNG DO; Resident Radiologist  This document has been electronically signed.  SHANE IRAHETA MD; Attending Radiologist  This document has been electronically signed. Mar  3 33698:31PM (03-03-22 @ 13:42)  Xray Chest 1 View-PORTABLE IMMEDIATE:   ACC: [id number] EXAM:  XR CHEST PORTABLE IMMED 1V                          PROCEDURE DATE:  02/09/2022          INTERPRETATION:  Clinical history: 66-year-old male, Covid positive.    Single expiratory/kyphotic view of the chest is compared to 2/2/2021.    FINDINGS: Normal cardiac silhouette and normal pulmonary vasculature with   no consolidation, effusion, gross adenopathy, pneumothorax or acute   osseous finding.    IMPRESSION:  No acute radiographic findings    --- End of Report ---            BROCK ELLIOTT DO; Attending Radiologist  This document has been electronically signed. Feb 10 2022  9:34AM (02-09-22 @ 18:45)                           ________________________________________________                      CHIEF COMPLAINT: FOLLOW UP IN ICU FOR POSTOPERATIVE CARE OF PATIENT WHO IS S/P              PROCEDURES:         Flexible bronchoscopy. Significant subglottic stenosis visualized with concern for anterior cricoid cartilage weakness. 11-March-2022         ISSUES:       Stridor   Tracheal stenosis   Anterior left cricoid cartilage necrosis   DM   Glioblastoma diagnosed Jan 2022, s/p craniotomy         INTERVAL EVENTS:      s/p bronchoscopy in OR with subglottic stenosis, granulation/soft tissue in setting of cricoid cartilage necrosis noted on CT scan  Patency of airway is maintained despite this soft tissue and patient should be able to get intubated if needed  Heliox/cricothyroidotomy at bedside if needed.  Transferred to ICU from OR after bronchoscopy, breathing with minimal inspiratory stridor, not using accessory muscles, not in distress, breathing comfortably.    ENT consulted to assess for cartilage necrosis - ? need for reconstruction versus tracheostomy given underlying glioblastoma with guarded long term prognosis.        HISTORY:      Says breathing is okay, complained of throat pain         PHYSICAL EXAM:      Gen: Comfortable, No acute distress     Eyes: Sclera white, Conjunctiva normal, Eyelids normal, Pupils symmetrical      ENT: Mucous membranes moist,  mild audible inspiratory stridor    Neck: Trachea midline,  ,  ,  ,  ,  ,       CV: Rate regular, Rhythm regular,  ,  ,       Resp: Breath sounds clear, No accessory muscles use, ,       Abd: Soft, Non-distended, Non-tender, Bowel sounds normal,  ,  ,       Skin: Warm, No peripheral edema of lower extremities,  ,       : No gonzalez     Neuro: Moving all 4 extremities,       Psych: A&Ox3               ASSESSMENT AND PLAN:           NEURO:     Post-operative Pain - Tylenol IV PRN.             RESPIRATORY:   Hypoxia - Humidified O2. Wean nasal cannula for goal O2sat above 92. Obtain CXR. Incentive spirometry. Chest PT and frequent suctioning. Continue bronchodilators. OOB to chair & ambulate w/ assistance. Continuous pulse oximetry for support & to prevent decompensation.     Heliox on stand by if needed. There is soft tissue swelling/ granulation tissue where anterior cricoid cartilage is necrotic however airway is patent and not completely occluded by this tissue. Should be intubatable. Cricothyroidotomy and trach kit at bedside    Keep HOB elevated, monitor for worsening stridor/respiratory distress      F/u ENT recs for further definitive management of cricoid cartilage necrosis which appears to interfere with airway causing stridor.      CARDIOVASCULAR:     Hemodynamically stable - Not on pressors. Continue hemodynamic monitoring.     Telemetry (medical test) - Reviewed by me today independently. Normal sinus rhythm.             RENAL:     Stable - Monitor IOs and electrolytes. Keep K above 4.0 and Mg above 2.0.             GASTROINTESTINAL:     GI prophylaxis not indicated     Zofran and Reglan IV PRN for nausea     NPO for now given stridor and in ICU for airway monitoring. Can start diet tomorrow if stable from airway standpoint            HEMATOLOGIC:     No signs of active bleeding. Monitor Hgb in CBC in AM     DVT prophylaxis with heparin subQ and SCDs.             INFECTIOUS DISEASE:     All surgical sites appear clean. No signs of active infection. Will monitor for fever and leukocytosis.           ENDOCRINE:     Stable – Monitor glucose fingersticks for goal 120-180.           Pertinent clinical, laboratory, radiographic, hemodynamic, echocardiographic, respiratory data, microbiologic data and chart were reviewed by myself and analyzed frequently throughout the course of the day and night by myself.     Plan discussed at length with the CTICU staff and Attending CT Surgeon -   Dr Trevin Chavez    Patient's status was discussed with patient at bedside.     _________________________  VITAL SIGNS:  Vital Signs Last 24 Hrs  T(C): 35 (11 Mar 2022 14:45), Max: 37.7 (11 Mar 2022 10:57)  T(F): 95 (11 Mar 2022 14:45), Max: 99.8 (11 Mar 2022 10:57)  HR: 96 (11 Mar 2022 15:00) (89 - 101)  BP: 100/55 (11 Mar 2022 15:00) (100/55 - 139/79)  BP(mean): 85 (11 Mar 2022 15:00) (72 - 96)  RR: 18 (11 Mar 2022 15:00) (16 - 24)  SpO2: 99% (11 Mar 2022 15:00) (98% - 100%)  I/Os:   I&O's Detail    11 Mar 2022 07:01  -  11 Mar 2022 15:28  --------------------------------------------------------  IN:    dextrose 5% + sodium chloride 0.45% w/ Additives: 150 mL  Total IN: 150 mL    OUT:  Total OUT: 0 mL    Total NET: 150 mL              MEDICATIONS:  MEDICATIONS  (STANDING):  dextrose 40% Gel 15 Gram(s) Oral once  dextrose 5% + sodium chloride 0.45% with potassium chloride 20 mEq/L 1000 milliLiter(s) (75 mL/Hr) IV Continuous <Continuous>  dextrose 5%. 1000 milliLiter(s) (50 mL/Hr) IV Continuous <Continuous>  dextrose 5%. 1000 milliLiter(s) (100 mL/Hr) IV Continuous <Continuous>  dextrose 50% Injectable 25 Gram(s) IV Push once  dextrose 50% Injectable 12.5 Gram(s) IV Push once  escitalopram 5 milliGRAM(s) Oral daily  famotidine Injectable 20 milliGRAM(s) IV Push every 12 hours  fluticasone propionate 50 MICROgram(s)/spray Nasal Spray 1 Spray(s) Both Nostrils two times a day  gabapentin 100 milliGRAM(s) Oral at bedtime  heparin   Injectable 5000 Unit(s) SubCutaneous every 8 hours  insulin glargine Injectable (LANTUS) 8 Unit(s) SubCutaneous at bedtime  insulin lispro (ADMELOG) corrective regimen sliding scale   SubCutaneous every 6 hours  lacosamide 100 milliGRAM(s) Oral two times a day  pantoprazole    Tablet 40 milliGRAM(s) Oral before breakfast    MEDICATIONS  (PRN):      LABS:  Laboratory data was independently reviewed by me today.                           11.7   9.53  )-----------( 144      ( 11 Mar 2022 11:10 )             36.5     03-11    135  |  97<L>  |  7   ----------------------------<  136<H>  3.9   |  26  |  0.51    Ca    9.0      11 Mar 2022 11:10    TPro  6.6  /  Alb  3.3  /  TBili  0.6  /  DBili  x   /  AST  23  /  ALT  20  /  AlkPhos  100  03-11    LIVER FUNCTIONS - ( 11 Mar 2022 11:10 )  Alb: 3.3 g/dL / Pro: 6.6 g/dL / ALK PHOS: 100 U/L / ALT: 20 U/L / AST: 23 U/L / GGT: x           PT/INR - ( 11 Mar 2022 12:06 )   PT: 14.8 sec;   INR: 1.27 ratio         PTT - ( 11 Mar 2022 12:06 )  PTT:28.6 sec        RADIOLOGY:   Radiology images were independently reviewed by me today. Reports were reviewed by me today.    Xray Chest 1 View- PORTABLE-Urgent:   ACC: [id number] EXAM:  XR CHEST PORTABLE URGENT 1V                          PROCEDURE DATE:  03/11/2022          INTERPRETATION:  CLINICAL INFORMATION: Dyspnea    Time of Exam:  March 11, 2022 at 12:04 PM    EXAM:  Frontal Chest    FINDINGS:  Both lungs are equally aerated and free of any focal abnormalities. The   heart is not enlarged and there is no effusion.        COMPARISON:  March 3, 2022        IMPRESSION:  No acute pulmonary disease.    --- End of Report ---            JOANNE BENNETT MD; Attending Radiologist  This document has been electronically signed. Mar 11 2022 12:15PM (03-11-22 @ 12:05)  Xray Chest 1 View- PORTABLE-Urgent:   ACC: [id number] EXAM:  XR CHEST PORTABLE URGENT 1V                          PROCEDURE DATE:  03/03/2022          INTERPRETATION:  EXAMINATION: XR CHEST URGENT    CLINICAL INDICATION: Postop status post tracheal dilatation.    TECHNIQUE: Single frontal, portable view of the chest was obtained.    COMPARISON: Chest radiograph 2/9/2022, CT chest 2/20/2022.    FINDINGS:    The heart is normal in size.  The lungs are clear.  There is no pneumothorax or pleural effusion.  No acute bony abnormalities.    IMPRESSION:  Clear lungs.    --- End of Report ---          LEONARDO SUNG DO; Resident Radiologist  This document has been electronically signed.  SHANE IRAHETA MD; Attending Radiologist  This document has been electronically signed. Mar  3 34917:31PM (03-03-22 @ 13:42)  Xray Chest 1 View-PORTABLE IMMEDIATE:   ACC: [id number] EXAM:  XR CHEST PORTABLE IMMED 1V                          PROCEDURE DATE:  02/09/2022          INTERPRETATION:  Clinical history: 66-year-old male, Covid positive.    Single expiratory/kyphotic view of the chest is compared to 2/2/2021.    FINDINGS: Normal cardiac silhouette and normal pulmonary vasculature with   no consolidation, effusion, gross adenopathy, pneumothorax or acute   osseous finding.    IMPRESSION:  No acute radiographic findings    --- End of Report ---            BROCK ELLIOTT DO; Attending Radiologist  This document has been electronically signed. Feb 10 2022  9:34AM (02-09-22 @ 18:45)                           ________________________________________________

## 2022-03-11 NOTE — H&P ADULT - NSHPSOCIALHISTORY_GEN_ALL_CORE
Social History:  Social History (marital status, living situation, occupation, tobacco use, alcohol and drug use, and sexual history): Denies toxic habits    Tobacco Screening:  · Core Measure Site	No  · Has the patient used tobacco in the past 30 days?	No

## 2022-03-11 NOTE — H&P ADULT - ASSESSMENT
: 68 y/o M with PMHx of recently dx Grade 4 glioblastoma s/p craniotomy, HTN, T2DM, and recent hospitalization at [facility] for tracheal stenosis p/w severe throat pain, difficulty breathing, and productive cough. Pt discharged on 3/3 after tracheal balloon dilation and biopsy. Now presents to ER with stridor, SOB

## 2022-03-11 NOTE — H&P ADULT - ATTENDING COMMENTS
patient seen - presented with throat pain but noted to have extensive stridor on exam.   discussed case with Dr. Chavez who intended to bronch emergently to evaluate and secure airway. in meantime patient being admitted to CT icu for monitoring.

## 2022-03-11 NOTE — ED PROVIDER NOTE - PROGRESS NOTE DETAILS
Annel Garcia PGY2: Pt evaluated by CTS. Given stridor, they would like to take pt to OR for Bronch. Will admit to their service.

## 2022-03-11 NOTE — ED PROVIDER NOTE - PHYSICAL EXAMINATION
Gen: Appears weak, lying in bed  HEENT: Tenderness to palpation in throat. Trachea midline, no lymph nodes palpated. Oropharynx unremarkable with no erythema. +stridor. Craniotomy scars noted.  CV: tachycardic but regular rhythm. No murmurs, rubs, or gallops.   Pulm: +stridor. Lungs CTAB with equal breath sounds b/l and no wheezes or crackles.  Abd: Normoactive bowel sounds, soft, nontender, nondistended  Ext: no LE edema, intact bipedal pulses

## 2022-03-11 NOTE — H&P ADULT - NSHPPHYSICALEXAM_GEN_ALL_CORE
General: WN/WD Mild resp distress  Neurology: A&Ox3, nonfocal, DUMONT x 4  Eyes: PERRLA/ EOMI, Gross vision intact  ENT/Neck: + inspiratory stridor  Respiratory: coarse wheezing throughout bilat  CV: RRR, S1S2, no murmurs, rubs or gallops  Abdominal: Soft, NT, ND +BS,   Extremities: No edema, + peripheral pulses  Skin: No Rashes, Hematoma, Ecchymosis

## 2022-03-11 NOTE — PATIENT PROFILE ADULT - FALL HARM RISK - HARM RISK INTERVENTIONS
Assistance with ambulation/Assistance OOB with selected safe patient handling equipment/Communicate Risk of Fall with Harm to all staff/Discuss with provider need for PT consult/Monitor gait and stability/Reinforce activity limits and safety measures with patient and family/Tailored Fall Risk Interventions/Visual Cue: Yellow wristband and red socks/Bed in lowest position, wheels locked, appropriate side rails in place/Call bell, personal items and telephone in reach/Instruct patient to call for assistance before getting out of bed or chair/Non-slip footwear when patient is out of bed/[place] to call system/Physically safe environment - no spills, clutter or unnecessary equipment/Purposeful Proactive Rounding/Room/bathroom lighting operational, light cord in reach

## 2022-03-11 NOTE — H&P ADULT - PROBLEM SELECTOR PLAN 1
Admit to Thoracic surgery DR. Garland  either CTICU for Heliox vs straight to OR today for  urgent Bronch, possible dilation possible Fulgeration   with either DR. Chavez or Dr. Garland  NPO  COVID swab  Labs, Type and screen  Above d/w pt and ER team

## 2022-03-11 NOTE — ED PROVIDER NOTE - OBJECTIVE STATEMENT
66 y/o M with PMHx of recently dx Grade 4 glioblastoma s/p craniotomy, HTN, T2DM, and recent hospitalization at [facility] for tracheal stenosis p/w severe throat pain, difficulty breathing, and productive cough. Pt discharged on 3/3 after tracheal balloon dilation and biopsy. 4 days ago (5 days post-discharge), pt started having severe 10/10 throat pain, worst under the chin but extending down to the rest of the throat, and having difficulty breathing. Throat pain is worsened with swallowing. Also endorses cough productive of yellow sputum, which has been keeping pt up at night. Denies fever, chills, chest pain, abdominal pain, N/V. Has been tolerating PO intake.

## 2022-03-11 NOTE — ED PROVIDER NOTE - CLINICAL SUMMARY MEDICAL DECISION MAKING FREE TEXT BOX
66 y/o M with PMHx of recently dx Grade 4 glioblastoma s/p craniotomy, HTN, T2DM, and recent hospitalization at [facility] for tracheal stenosis s/p balloon dilation and biopsy p/w severe throat pain, difficulty breathing, and productive cough. Pain and stridor may be post-surgical in nature, so will consult CT surgery. CBC, CMP, VBG, CXR to r/o pneumonia given high risk of aspiration and productive cough.

## 2022-03-12 LAB
ANION GAP SERPL CALC-SCNC: 10 MMOL/L — SIGNIFICANT CHANGE UP (ref 7–14)
BASOPHILS # BLD AUTO: 0.01 K/UL — SIGNIFICANT CHANGE UP (ref 0–0.2)
BASOPHILS NFR BLD AUTO: 0.2 % — SIGNIFICANT CHANGE UP (ref 0–2)
BUN SERPL-MCNC: 6 MG/DL — LOW (ref 7–23)
CALCIUM SERPL-MCNC: 8.6 MG/DL — SIGNIFICANT CHANGE UP (ref 8.4–10.5)
CHLORIDE SERPL-SCNC: 97 MMOL/L — LOW (ref 98–107)
CO2 SERPL-SCNC: 26 MMOL/L — SIGNIFICANT CHANGE UP (ref 22–31)
CREAT SERPL-MCNC: 0.48 MG/DL — LOW (ref 0.5–1.3)
EGFR: 114 ML/MIN/1.73M2 — SIGNIFICANT CHANGE UP
EOSINOPHIL # BLD AUTO: 0.08 K/UL — SIGNIFICANT CHANGE UP (ref 0–0.5)
EOSINOPHIL NFR BLD AUTO: 1.5 % — SIGNIFICANT CHANGE UP (ref 0–6)
GLUCOSE BLDC GLUCOMTR-MCNC: 183 MG/DL — HIGH (ref 70–99)
GLUCOSE BLDC GLUCOMTR-MCNC: 183 MG/DL — HIGH (ref 70–99)
GLUCOSE BLDC GLUCOMTR-MCNC: 198 MG/DL — HIGH (ref 70–99)
GLUCOSE SERPL-MCNC: 196 MG/DL — HIGH (ref 70–99)
HCT VFR BLD CALC: 32.6 % — LOW (ref 39–50)
HGB BLD-MCNC: 10.7 G/DL — LOW (ref 13–17)
IANC: 4.37 K/UL — SIGNIFICANT CHANGE UP (ref 1.5–8.5)
IMM GRANULOCYTES NFR BLD AUTO: 0.4 % — SIGNIFICANT CHANGE UP (ref 0–1.5)
LYMPHOCYTES # BLD AUTO: 0.61 K/UL — LOW (ref 1–3.3)
LYMPHOCYTES # BLD AUTO: 11.4 % — LOW (ref 13–44)
MAGNESIUM SERPL-MCNC: 1.6 MG/DL — SIGNIFICANT CHANGE UP (ref 1.6–2.6)
MCHC RBC-ENTMCNC: 30.8 PG — SIGNIFICANT CHANGE UP (ref 27–34)
MCHC RBC-ENTMCNC: 32.8 GM/DL — SIGNIFICANT CHANGE UP (ref 32–36)
MCV RBC AUTO: 93.9 FL — SIGNIFICANT CHANGE UP (ref 80–100)
MONOCYTES # BLD AUTO: 0.26 K/UL — SIGNIFICANT CHANGE UP (ref 0–0.9)
MONOCYTES NFR BLD AUTO: 4.9 % — SIGNIFICANT CHANGE UP (ref 2–14)
NEUTROPHILS # BLD AUTO: 4.37 K/UL — SIGNIFICANT CHANGE UP (ref 1.8–7.4)
NEUTROPHILS NFR BLD AUTO: 81.6 % — HIGH (ref 43–77)
NRBC # BLD: 0 /100 WBCS — SIGNIFICANT CHANGE UP
NRBC # FLD: 0 K/UL — SIGNIFICANT CHANGE UP
PHOSPHATE SERPL-MCNC: 2.6 MG/DL — SIGNIFICANT CHANGE UP (ref 2.5–4.5)
PLATELET # BLD AUTO: 111 K/UL — LOW (ref 150–400)
POTASSIUM SERPL-MCNC: 4.1 MMOL/L — SIGNIFICANT CHANGE UP (ref 3.5–5.3)
POTASSIUM SERPL-SCNC: 4.1 MMOL/L — SIGNIFICANT CHANGE UP (ref 3.5–5.3)
RBC # BLD: 3.47 M/UL — LOW (ref 4.2–5.8)
RBC # FLD: 13.2 % — SIGNIFICANT CHANGE UP (ref 10.3–14.5)
SODIUM SERPL-SCNC: 133 MMOL/L — LOW (ref 135–145)
WBC # BLD: 5.35 K/UL — SIGNIFICANT CHANGE UP (ref 3.8–10.5)
WBC # FLD AUTO: 5.35 K/UL — SIGNIFICANT CHANGE UP (ref 3.8–10.5)

## 2022-03-12 PROCEDURE — 31575 DIAGNOSTIC LARYNGOSCOPY: CPT

## 2022-03-12 PROCEDURE — 99291 CRITICAL CARE FIRST HOUR: CPT

## 2022-03-12 PROCEDURE — 71045 X-RAY EXAM CHEST 1 VIEW: CPT | Mod: 26

## 2022-03-12 PROCEDURE — 99222 1ST HOSP IP/OBS MODERATE 55: CPT | Mod: 25

## 2022-03-12 RX ORDER — LANOLIN ALCOHOL/MO/W.PET/CERES
3 CREAM (GRAM) TOPICAL AT BEDTIME
Refills: 0 | Status: DISCONTINUED | OUTPATIENT
Start: 2022-03-12 | End: 2022-03-17

## 2022-03-12 RX ORDER — MAGNESIUM SULFATE 500 MG/ML
2 VIAL (ML) INJECTION ONCE
Refills: 0 | Status: COMPLETED | OUTPATIENT
Start: 2022-03-12 | End: 2022-03-12

## 2022-03-12 RX ADMIN — Medication 2: at 06:21

## 2022-03-12 RX ADMIN — Medication 2: at 21:36

## 2022-03-12 RX ADMIN — GABAPENTIN 100 MILLIGRAM(S): 400 CAPSULE ORAL at 21:37

## 2022-03-12 RX ADMIN — PANTOPRAZOLE SODIUM 40 MILLIGRAM(S): 20 TABLET, DELAYED RELEASE ORAL at 12:51

## 2022-03-12 RX ADMIN — Medication 1 SPRAY(S): at 07:10

## 2022-03-12 RX ADMIN — LACOSAMIDE 120 MILLIGRAM(S): 50 TABLET ORAL at 00:01

## 2022-03-12 RX ADMIN — LACOSAMIDE 120 MILLIGRAM(S): 50 TABLET ORAL at 12:51

## 2022-03-12 RX ADMIN — Medication 25 GRAM(S): at 07:11

## 2022-03-12 RX ADMIN — SODIUM CHLORIDE 75 MILLILITER(S): 9 INJECTION, SOLUTION INTRAVENOUS at 00:21

## 2022-03-12 RX ADMIN — INSULIN GLARGINE 8 UNIT(S): 100 INJECTION, SOLUTION SUBCUTANEOUS at 21:35

## 2022-03-12 RX ADMIN — Medication 1 SPRAY(S): at 17:23

## 2022-03-12 RX ADMIN — ESCITALOPRAM OXALATE 5 MILLIGRAM(S): 10 TABLET, FILM COATED ORAL at 12:51

## 2022-03-12 RX ADMIN — HEPARIN SODIUM 5000 UNIT(S): 5000 INJECTION INTRAVENOUS; SUBCUTANEOUS at 21:37

## 2022-03-12 RX ADMIN — Medication 2: at 17:28

## 2022-03-12 RX ADMIN — SODIUM CHLORIDE 1 GRAM(S): 9 INJECTION INTRAMUSCULAR; INTRAVENOUS; SUBCUTANEOUS at 17:23

## 2022-03-12 RX ADMIN — LACOSAMIDE 120 MILLIGRAM(S): 50 TABLET ORAL at 23:28

## 2022-03-12 RX ADMIN — Medication 3 MILLIGRAM(S): at 21:36

## 2022-03-12 RX ADMIN — HEPARIN SODIUM 5000 UNIT(S): 5000 INJECTION INTRAVENOUS; SUBCUTANEOUS at 06:20

## 2022-03-12 RX ADMIN — HEPARIN SODIUM 5000 UNIT(S): 5000 INJECTION INTRAVENOUS; SUBCUTANEOUS at 13:57

## 2022-03-12 NOTE — PHYSICAL THERAPY INITIAL EVALUATION ADULT - GENERAL OBSERVATIONS, REHAB EVAL
Consult received, chart reviewed. Patient received in bed, no apparent distress, +cardiac monitor, +NRB.

## 2022-03-12 NOTE — PHYSICAL THERAPY INITIAL EVALUATION ADULT - PATIENT PROFILE REVIEW, REHAB EVAL
Activity - Ambulate as tolerated. Spoke with Parker MERCHANT RN and Alessandro HILL, pt. ok for OOB to chair./yes

## 2022-03-12 NOTE — PROGRESS NOTE ADULT - SUBJECTIVE AND OBJECTIVE BOX
JORDAN CHAKRABORTY      67y   Male   [id number]         No Known Allergies             Daily Height in cm: 170.18 (11 Mar 2022 10:00)    Daily Drug Dosing Weight  Height (cm): 170.2 (11 Mar 2022 10:00)  Weight (kg): 70.2 (02 Mar 2022 12:46)  BMI (kg/m2): 24.2 (11 Mar 2022 10:00)  BSA (m2): 1.81 (11 Mar 2022 10:00)    HPI:    HPI Objective Statement: 66 y/o M with PMHx of recently dx Grade 4 glioblastoma s/p craniotomy, HTN, T2DM, and recent hospitalization at [facility] for tracheal stenosis p/w severe throat pain, difficulty breathing, and productive cough. Pt discharged on 3/3 after tracheal balloon dilation and biopsy. 4 days ago (5 days post-discharge), pt started having severe 10/10 throat pain, worst under the chin but extending down to the rest of the throat, and having difficulty breathing. Throat pain is worsened with swallowing. Also endorses cough productive of yellow sputum, which has been keeping pt up at night. Denies fever, chills, chest pain, abdominal pain, N/V. Has been tolerating PO intake.  Pt had on 3/2/22 a Bronch, balloon dilation and tracheal biopsy with Dr Garland. Post procedure pt's symptoms improved and pt was eventually discharged to home by medical service with plans for outpt follow up with DR Goldman. OR pathology showed no malignancy. Today pt presents to [facility] ER with worsening cough, SOB, and neck pain x 4 days. Pt seen with DR Garland. Pt has audible inspiratory stridor with some SOB during exam. Daughter and bedside who confirms symptoms. ASsessment done with Dr. Garland speaking to pt and pt's daughter in Yoruba. Pt looks to be in mild resp distress. On exam coarse wheezing heard throughout. Pt to be admitted to Thoracic surgery, CTICU with plans for urgent add on Bronchoscopy today with DR Chavez or DR. Garland.  (11 Mar 2022 11:36)    Procedure: Flexible bronchoscopy. Significant subglottic stenosis visualized with concern for anterior cricoid cartilage weakness. 11-March-2022                       Issues:               Tracheal stenosis               Stridor   Anterior left cricoid cartilage necrosis   DM   Glioblastoma  s/p craniotomy                Home Medications:  acetaminophen 325 mg oral tablet: 2 tab(s) orally every 6 hours, As needed, Mild Pain (1 - 3), Moderate Pain (4 - 6) (03 Mar 2022 18:11)  melatonin 3 mg oral tablet: 1 tab(s) orally once a day (at bedtime) (01 Mar 2022 03:19)  polyethylene glycol 3350 oral powder for reconstitution: 17 gram(s) orally every 12 hours, As Needed for constipation (if no BM x 2 days).  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    Type 2 diabetes mellitus    S/P craniotomy      Vital Signs Last 24 Hrs  T(C): 36.5 (12 Mar 2022 08:00), Max: 37.7 (11 Mar 2022 10:57)  T(F): 97.7 (12 Mar 2022 08:00), Max: 99.8 (11 Mar 2022 10:57)  HR: 75 (12 Mar 2022 08:00) (68 - 101)  BP: 111/66 (12 Mar 2022 08:00) (90/60 - 148/109)  BP(mean): 78 (12 Mar 2022 08:00) (52 - 119)  RR: 13 (12 Mar 2022 08:00) (13 - 26)  SpO2: 98% (12 Mar 2022 08:00) (95% - 100%)  I&O's Detail    11 Mar 2022 07:01  -  12 Mar 2022 07:00  --------------------------------------------------------  IN:    dextrose 5% + sodium chloride 0.45% w/ Additives: 600 mL    IV PiggyBack: 100 mL    Lactated Ringers: 675 mL  Total IN: 1375 mL    OUT:    Stool (mL): 300 mL    Voided (mL): 1600 mL  Total OUT: 1900 mL    Total NET: -525 mL      12 Mar 2022 06:01  -  12 Mar 2022 09:48  --------------------------------------------------------  IN:    Lactated Ringers: 75 mL  Total IN: 75 mL    OUT:  Total OUT: 0 mL    Total NET: 75 mL        CAPILLARY BLOOD GLUCOSE      POCT Blood Glucose.: 183 mg/dL (12 Mar 2022 06:19)  POCT Blood Glucose.: 278 mg/dL (11 Mar 2022 23:25)  POCT Blood Glucose.: 321 mg/dL (11 Mar 2022 23:23)  POCT Blood Glucose.: 197 mg/dL (11 Mar 2022 17:03)  POCT Blood Glucose.: 134 mg/dL (11 Mar 2022 15:27)  POCT Blood Glucose.: 188 mg/dL (11 Mar 2022 10:09)    Home Medications:  acetaminophen 325 mg oral tablet: 2 tab(s) orally every 6 hours, As needed, Mild Pain (1 - 3), Moderate Pain (4 - 6) (03 Mar 2022 18:11)  melatonin 3 mg oral tablet: 1 tab(s) orally once a day (at bedtime) (01 Mar 2022 03:19)  polyethylene glycol 3350 oral powder for reconstitution: 17 gram(s) orally every 12 hours, As Needed for constipation (if no BM x 2 days).  (01 Mar 2022 03:19)  senna oral tablet: 2 tab(s) orally once a day (at bedtime) (01 Mar 2022 03:19)  Vimpat 100 mg oral tablet: 1 tab(s) orally 2 times a day (01 Mar 2022 03:19)    MEDICATIONS  (STANDING):  dextrose 40% Gel 15 Gram(s) Oral once  dextrose 5%. 1000 milliLiter(s) (50 mL/Hr) IV Continuous <Continuous>  dextrose 5%. 1000 milliLiter(s) (100 mL/Hr) IV Continuous <Continuous>  dextrose 50% Injectable 25 Gram(s) IV Push once  dextrose 50% Injectable 12.5 Gram(s) IV Push once  escitalopram 5 milliGRAM(s) Oral daily  fluticasone propionate 50 MICROgram(s)/spray Nasal Spray 1 Spray(s) Both Nostrils two times a day  gabapentin 100 milliGRAM(s) Oral at bedtime  heparin   Injectable 5000 Unit(s) SubCutaneous every 8 hours  influenza  Vaccine (HIGH DOSE) 0.7 milliLiter(s) IntraMuscular once  insulin glargine Injectable (LANTUS) 8 Unit(s) SubCutaneous at bedtime  insulin lispro (ADMELOG) corrective regimen sliding scale   SubCutaneous every 6 hours  lacosamide IVPB 100 milliGRAM(s) IV Intermittent every 12 hours  lactated ringers. 1000 milliLiter(s) (75 mL/Hr) IV Continuous <Continuous>  pantoprazole  Injectable 40 milliGRAM(s) IV Push daily  sodium chloride 1 Gram(s) Oral two times a day    MEDICATIONS  (PRN):      Physical exam:                             General:               Pt is awake, alert,  appears to becomfortable                                                  Neuro:                  Nonfocal                             Cardiovascular:   S1 & S2, regular                           Respiratory:         Air entry is fair and equal on both sides, has bilateral conducted sounds                           GI:                          Soft, nondistended and nontender, Bowel sounds active                            Ext:                        No cyanosis or edema     Labs:                                                                           10.7   5.35  )-----------( 111      ( 12 Mar 2022 05:44 )             32.6             03-12    133<L>  |  97<L>  |  6<L>  ----------------------------<  196<H>  4.1   |  26  |  0.48<L>    Ca    8.6      12 Mar 2022 05:44  Phos  2.6     03-12  Mg     1.60     03-12    TPro  6.6  /  Alb  3.3  /  TBili  0.6  /  DBili  x   /  AST  23  /  ALT  20  /  AlkPhos  100  03-11                  PT/INR - ( 11 Mar 2022 12:06 )   PT: 14.8 sec;   INR: 1.27 ratio         PTT - ( 11 Mar 2022 12:06 )  PTT:28.6 sec  LIVER FUNCTIONS - ( 11 Mar 2022 11:10 )  Alb: 3.3 g/dL / Pro: 6.6 g/dL / ALK PHOS: 100 U/L / ALT: 20 U/L / AST: 23 U/L / GGT: x             CXR:  < from: Xray Chest 1 View- PORTABLE-Urgent (03.11.22 @ 12:05) >  Both lungs are equally aerated and free of any focal abnormalities. The   heart is not enlarged and there is no effusion.      COMPARISON:  March 3, 2022      IMPRESSION:  No acute pulmonary disease.        Plan:  General: 67yMale s/p Flexible bronchoscopy. Significant subglottic stenosis visualized with concern for anterior cricoid cartilage weakness. 11-March-2022, fairly comfortable on Heliox.                             Neuro:                                         Pain control with   Tylenol PRN    glioblastoma s/p craniotomy - Continue Vimpat                             Cardiovascular:                                          Continue hemodynamic monitoring.                            Respiratory:                                         Stridor / Tracheal stenosis: On Heliox                                         Comfortable, not in any distress.                                                             Continue bronchodilators, pulmonary toilet- PRN     ENT f/u                            GI                                         Start soft diet                                         Continue Zofran / Reglan for nausea - PRN                                         Continue bowel regimen	                                                                 Renal:                                         Continue LR  30cc/hr                                         Monitor I/Os and electrolytes                                                                                        Hem/ Onc:                                         DVT prophylaxis with SQ Heparin and SCDs                                         Follow CBC in AM                           Infectious disease:                                            Monitor for fever / leukocytosis.                                          All surgical incision / chest tube  sites look clean                            Endocrine                                            Continue Accu-Checks with coverage       Pertinent clinical, laboratory, radiographic, hemodynamic, echocardiographic, respiratory data, microbiologic data and chart were reviewed and analyzed frequently throughout the course of the day and night  Patient seen, examined and plan discussed with CT Surgeon Dr. Arce / CTICU team during rounds.    OOB to chair and ambulate as tolerated.     Status discussed with patient /  patient's family and updated plan of care      I have spent 35  minutes of critical care time with this pt between  7am and 7pm monitoring hemodynamic status, respiratory status, managing heliox and preventing progression to respiratory failure.            Miky Munoz MD

## 2022-03-12 NOTE — CONSULT NOTE ADULT - ASSESSMENT
A/P:  68 y/o M with PMHx of recently dx Grade 4 glioblastoma s/p craniotomy, HTN, T2DM, and recent hospitalization at [facility] for tracheal stenosis p/w severe throat pain, difficulty breathing, and productive cough. Required bronchoscopy and dilation yesterday which showed significant SGS with concern for anterior cric weakness. Now much improved symptoms, no stridor on bedside exam, no issues respiratory wise, on 5L nasal cannula. Has had pain with swallowing which has improved since procedure. Tolerating his current diet. Patient said he was intubated for craniotomy on 1/27 and in his postop period started having pain and breathing issues in rehab. Per daughter at bedside, there may have been issues with his vocal folds and concern of aspiration at one point. He had markedly breathy voice that has since improved. Pt had similar event two weeks ago that improved with bronch/dilation with biopsy that showed no malignancy.    - CT w/ Circumferential thickening of the trachea with resultant severe narrowing at the level of thoracic inlet.  - Scope exam with no significant supraglottic obstruction, mild L VF weakness  - Difficult to visualize SG area  - Will d/w CTS for possible tracheostomy before starting chemotherapy  - D/w attending

## 2022-03-12 NOTE — PHYSICAL THERAPY INITIAL EVALUATION ADULT - ADDITIONAL COMMENTS
Pt. was left seated in chair post PT Evaluation, no apparent distress, all lines intact, call bell within reach. Parker DENNISON aware.

## 2022-03-12 NOTE — CONSULT NOTE ADULT - SUBJECTIVE AND OBJECTIVE BOX
ENT CONSULT NOTE    HPI: 68 y/o M with PMHx of recently dx Grade 4 glioblastoma s/p craniotomy, HTN, T2DM, and recent hospitalization at [facility] for tracheal stenosis p/w severe throat pain, difficulty breathing, and productive cough. Pt discharged on 3/3 after tracheal balloon dilation and biopsy. 4 days before admission (5 days post-discharge), pt started having severe 10/10 throat pain, worst under the chin but extending down to the rest of the throat, and having difficulty breathing. Throat pain is worsened with swallowing. Also endorses cough productive of yellow sputum, which has been keeping pt up at night.     Pt had on 3/2/22 a Bronch, balloon dilation and tracheal biopsy with Dr Garland. Post procedure pt's symptoms improved and pt was eventually discharged to home by medical service with plans for outpt follow up with DR Goldman. OR pathology showed no malignancy. Pt presented again with chest pain, SOB and stridor requiring bronchoscopy and dilation yesterday which showed significant SGS with concern for anterior cric weakness. Now much improved symptoms, no stridor on bedside exam, no issues respiratory wise, on 5L nasal cannula. Has had pain with swallowing which has improved since procedure. Tolerating his current diet. Patient said he was intubated for craniotomy on 1/27 and in his postop period started having pain and breathing issues in rehab. Per daughter at bedside, there may have been issues with his vocal folds and concern of aspiration at one point. He had markedly breathy voice that has since improved. No other complaints.    Home Medications:  acetaminophen 325 mg oral tablet: 2 tab(s) orally every 6 hours, As needed, Mild Pain (1 - 3), Moderate Pain (4 - 6) (03 Mar 2022 18:11)  melatonin 3 mg oral tablet: 1 tab(s) orally once a day (at bedtime) (01 Mar 2022 03:19)  polyethylene glycol 3350 oral powder for reconstitution: 17 gram(s) orally every 12 hours, As Needed for constipation (if no BM x 2 days).  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    Type 2 diabetes mellitus    S/P craniotomy      Vital Signs Last 24 Hrs  T(C): 36.5 (12 Mar 2022 08:00), Max: 37.7 (11 Mar 2022 10:57)  T(F): 97.7 (12 Mar 2022 08:00), Max: 99.8 (11 Mar 2022 10:57)  HR: 75 (12 Mar 2022 08:00) (68 - 101)  BP: 111/66 (12 Mar 2022 08:00) (90/60 - 148/109)  BP(mean): 78 (12 Mar 2022 08:00) (52 - 119)  RR: 13 (12 Mar 2022 08:00) (13 - 26)  SpO2: 98% (12 Mar 2022 08:00) (95% - 100%)  I&O's Detail    11 Mar 2022 07:01  -  12 Mar 2022 07:00  --------------------------------------------------------  IN:    dextrose 5% + sodium chloride 0.45% w/ Additives: 600 mL    IV PiggyBack: 100 mL    Lactated Ringers: 675 mL  Total IN: 1375 mL    OUT:    Stool (mL): 300 mL    Voided (mL): 1600 mL  Total OUT: 1900 mL    Total NET: -525 mL      12 Mar 2022 06:01  -  12 Mar 2022 09:48  --------------------------------------------------------  IN:    Lactated Ringers: 75 mL  Total IN: 75 mL    OUT:  Total OUT: 0 mL    Total NET: 75 mL    PHYSICAL EXAM:    CONSTITUTIONAL: Well nourished, well developed, NON-DYSMORPHIC, and in no acute distress.    HEAD: normocephalic, atraumatic.  NECK: Tenderness to palpation on cric, no skin changes  RESPIRATORY: Respirations unlabored, no increased work of breathing with use of accessory muscles and retractions. No stridor.  CARDIAC: Warm extremities, no cyanosis.       Procedure: Flexible laryngoscopy    Pre-procedure diagnosis/Indication for procedure: To evaluate larynx    Description:    A flexible endoscope was used to examine the left and right nasal cavities, posterior oropharynx, hypopharynx, and larynx. The nasal valve areas were examined for abnormalities or collapse. The inferior and middle turbinates were evaluated. The middle and superior meatuses, the sphenoethmoid recesses, and the nasopharynx were examined and inspected for mucopurulence, polyps, and nasal masses. The oropharynx, tongue base/vallecula, epiglottis, aryepiglottic folds, arytenoids, vocal cords, hypopharynx were also inspected for swelling, inflammation, or dysfunction. The patient tolerated the procedure without complications.    Nasopharynx wnl  BOT/vallecula normal  Epiglottis sharp  AE folds nonedematous  Arytenoids mobile  Vocal folds mobile bilaterally, though left appears mildly weak  No masses or lesions visualized in post cricoid space or pyriform sinuses bilaterally  No active bleeding or significant obstruction noted in supraglottic area.

## 2022-03-13 LAB
ANION GAP SERPL CALC-SCNC: 9 MMOL/L — SIGNIFICANT CHANGE UP (ref 7–14)
BUN SERPL-MCNC: 5 MG/DL — LOW (ref 7–23)
CALCIUM SERPL-MCNC: 8.3 MG/DL — LOW (ref 8.4–10.5)
CHLORIDE SERPL-SCNC: 99 MMOL/L — SIGNIFICANT CHANGE UP (ref 98–107)
CO2 SERPL-SCNC: 29 MMOL/L — SIGNIFICANT CHANGE UP (ref 22–31)
CREAT SERPL-MCNC: 0.52 MG/DL — SIGNIFICANT CHANGE UP (ref 0.5–1.3)
EGFR: 111 ML/MIN/1.73M2 — SIGNIFICANT CHANGE UP
GLUCOSE BLDC GLUCOMTR-MCNC: 183 MG/DL — HIGH (ref 70–99)
GLUCOSE BLDC GLUCOMTR-MCNC: 186 MG/DL — HIGH (ref 70–99)
GLUCOSE BLDC GLUCOMTR-MCNC: 212 MG/DL — HIGH (ref 70–99)
GLUCOSE BLDC GLUCOMTR-MCNC: 91 MG/DL — SIGNIFICANT CHANGE UP (ref 70–99)
GLUCOSE SERPL-MCNC: 83 MG/DL — SIGNIFICANT CHANGE UP (ref 70–99)
HCT VFR BLD CALC: 33.9 % — LOW (ref 39–50)
HGB BLD-MCNC: 11 G/DL — LOW (ref 13–17)
MAGNESIUM SERPL-MCNC: 1.6 MG/DL — SIGNIFICANT CHANGE UP (ref 1.6–2.6)
MCHC RBC-ENTMCNC: 30.7 PG — SIGNIFICANT CHANGE UP (ref 27–34)
MCHC RBC-ENTMCNC: 32.4 GM/DL — SIGNIFICANT CHANGE UP (ref 32–36)
MCV RBC AUTO: 94.7 FL — SIGNIFICANT CHANGE UP (ref 80–100)
NRBC # BLD: 0 /100 WBCS — SIGNIFICANT CHANGE UP
NRBC # FLD: 0 K/UL — SIGNIFICANT CHANGE UP
PHOSPHATE SERPL-MCNC: 2.3 MG/DL — LOW (ref 2.5–4.5)
PLATELET # BLD AUTO: 127 K/UL — LOW (ref 150–400)
POTASSIUM SERPL-MCNC: 3.3 MMOL/L — LOW (ref 3.5–5.3)
POTASSIUM SERPL-SCNC: 3.3 MMOL/L — LOW (ref 3.5–5.3)
RBC # BLD: 3.58 M/UL — LOW (ref 4.2–5.8)
RBC # FLD: 13 % — SIGNIFICANT CHANGE UP (ref 10.3–14.5)
SODIUM SERPL-SCNC: 137 MMOL/L — SIGNIFICANT CHANGE UP (ref 135–145)
WBC # BLD: 8.43 K/UL — SIGNIFICANT CHANGE UP (ref 3.8–10.5)
WBC # FLD AUTO: 8.43 K/UL — SIGNIFICANT CHANGE UP (ref 3.8–10.5)

## 2022-03-13 PROCEDURE — 71045 X-RAY EXAM CHEST 1 VIEW: CPT | Mod: 26

## 2022-03-13 PROCEDURE — 99233 SBSQ HOSP IP/OBS HIGH 50: CPT

## 2022-03-13 RX ORDER — POTASSIUM CHLORIDE 20 MEQ
20 PACKET (EA) ORAL ONCE
Refills: 0 | Status: COMPLETED | OUTPATIENT
Start: 2022-03-13 | End: 2022-03-13

## 2022-03-13 RX ORDER — MAGNESIUM SULFATE 500 MG/ML
2 VIAL (ML) INJECTION ONCE
Refills: 0 | Status: COMPLETED | OUTPATIENT
Start: 2022-03-13 | End: 2022-03-13

## 2022-03-13 RX ORDER — POTASSIUM CHLORIDE 20 MEQ
10 PACKET (EA) ORAL
Refills: 0 | Status: COMPLETED | OUTPATIENT
Start: 2022-03-13 | End: 2022-03-13

## 2022-03-13 RX ORDER — POTASSIUM PHOSPHATE, MONOBASIC POTASSIUM PHOSPHATE, DIBASIC 236; 224 MG/ML; MG/ML
30 INJECTION, SOLUTION INTRAVENOUS ONCE
Refills: 0 | Status: COMPLETED | OUTPATIENT
Start: 2022-03-13 | End: 2022-03-13

## 2022-03-13 RX ADMIN — HEPARIN SODIUM 5000 UNIT(S): 5000 INJECTION INTRAVENOUS; SUBCUTANEOUS at 06:14

## 2022-03-13 RX ADMIN — LACOSAMIDE 120 MILLIGRAM(S): 50 TABLET ORAL at 11:14

## 2022-03-13 RX ADMIN — Medication 4: at 21:47

## 2022-03-13 RX ADMIN — HEPARIN SODIUM 5000 UNIT(S): 5000 INJECTION INTRAVENOUS; SUBCUTANEOUS at 11:19

## 2022-03-13 RX ADMIN — POTASSIUM PHOSPHATE, MONOBASIC POTASSIUM PHOSPHATE, DIBASIC 83.33 MILLIMOLE(S): 236; 224 INJECTION, SOLUTION INTRAVENOUS at 08:11

## 2022-03-13 RX ADMIN — Medication 1 SPRAY(S): at 06:16

## 2022-03-13 RX ADMIN — HEPARIN SODIUM 5000 UNIT(S): 5000 INJECTION INTRAVENOUS; SUBCUTANEOUS at 21:43

## 2022-03-13 RX ADMIN — Medication 100 MILLIEQUIVALENT(S): at 07:58

## 2022-03-13 RX ADMIN — GABAPENTIN 100 MILLIGRAM(S): 400 CAPSULE ORAL at 21:46

## 2022-03-13 RX ADMIN — PANTOPRAZOLE SODIUM 40 MILLIGRAM(S): 20 TABLET, DELAYED RELEASE ORAL at 11:15

## 2022-03-13 RX ADMIN — Medication 2: at 15:54

## 2022-03-13 RX ADMIN — Medication 3 MILLIGRAM(S): at 21:44

## 2022-03-13 RX ADMIN — Medication 100 MILLIEQUIVALENT(S): at 06:13

## 2022-03-13 RX ADMIN — Medication 100 MILLIEQUIVALENT(S): at 09:03

## 2022-03-13 RX ADMIN — INSULIN GLARGINE 8 UNIT(S): 100 INJECTION, SOLUTION SUBCUTANEOUS at 21:43

## 2022-03-13 RX ADMIN — Medication 20 MILLIEQUIVALENT(S): at 06:28

## 2022-03-13 RX ADMIN — Medication 1 SPRAY(S): at 17:10

## 2022-03-13 RX ADMIN — Medication 25 GRAM(S): at 06:21

## 2022-03-13 RX ADMIN — ESCITALOPRAM OXALATE 5 MILLIGRAM(S): 10 TABLET, FILM COATED ORAL at 14:30

## 2022-03-13 RX ADMIN — SODIUM CHLORIDE 1 GRAM(S): 9 INJECTION INTRAMUSCULAR; INTRAVENOUS; SUBCUTANEOUS at 06:15

## 2022-03-13 RX ADMIN — Medication 2: at 11:15

## 2022-03-13 RX ADMIN — SODIUM CHLORIDE 1 GRAM(S): 9 INJECTION INTRAMUSCULAR; INTRAVENOUS; SUBCUTANEOUS at 17:10

## 2022-03-13 NOTE — PROGRESS NOTE ADULT - SUBJECTIVE AND OBJECTIVE BOX
CHIEF COMPLAINT: FOLLOW UP IN ICU FOR POSTOPERATIVE CARE OF PATIENT WHO IS S/P              PROCEDURES:         Flexible bronchoscopy. Significant subglottic stenosis visualized with concern for anterior cricoid cartilage weakness. 11-March-2022         ISSUES:       Stridor   Tracheal stenosis   Anterior left cricoid cartilage necrosis   DM   Glioblastoma diagnosed Jan 2022, s/p craniotomy         INTERVAL EVENTS:      Stable on heliox via nasal cannula. Minimal stridor today, breathing appears much improved compared to before  Blood glucose better controlled  Tolerating soft diet  ENT to f/u if patient will need tracheostomy for more definitive management for tracheal stenosis and cricoid cartilage necrosis        HISTORY:      Says breathing is im         PHYSICAL EXAM:      Gen: Comfortable, No acute distress     Eyes: Sclera white, Conjunctiva normal, Eyelids normal, Pupils symmetrical      ENT: Mucous membranes moist,  mild audible inspiratory stridor    Neck: Trachea midline,  ,  ,  ,  ,  ,       CV: Rate regular, Rhythm regular,  ,  ,       Resp: Breath sounds clear, No accessory muscles use, ,       Abd: Soft, Non-distended, Non-tender, Bowel sounds normal,  ,  ,       Skin: Warm, No peripheral edema of lower extremities,  ,       : No gonzalez     Neuro: Moving all 4 extremities,       Psych: A&Ox3               ASSESSMENT AND PLAN:           NEURO:     Post-operative Pain - Tylenol IV PRN.             RESPIRATORY:   Hypoxia - Humidified O2. Wean nasal cannula for goal O2sat above 92. Obtain CXR. Incentive spirometry. Chest PT and frequent suctioning. Continue bronchodilators. OOB to chair & ambulate w/ assistance. Continuous pulse oximetry for support & to prevent decompensation.     Heliox on stand by if needed. There is soft tissue swelling/ granulation tissue where anterior cricoid cartilage is necrotic however airway is patent and not completely occluded by this tissue. Should be intubatable. Cricothyroidotomy and trach kit at bedside    Keep HOB elevated, monitor for worsening stridor/respiratory distress      F/u ENT recs for further definitive management of cricoid cartilage necrosis which appears to interfere with airway causing stridor.      CARDIOVASCULAR:     Hemodynamically stable - Not on pressors. Continue hemodynamic monitoring.     Telemetry (medical test) - Reviewed by me today independently. Normal sinus rhythm.             RENAL:     Stable - Monitor IOs and electrolytes. Keep K above 4.0 and Mg above 2.0.             GASTROINTESTINAL:     GI prophylaxis not indicated     Zofran and Reglan IV PRN for nausea     NPO for now given stridor and in ICU for airway monitoring. Can start diet tomorrow if stable from airway standpoint            HEMATOLOGIC:     No signs of active bleeding. Monitor Hgb in CBC in AM     DVT prophylaxis with heparin subQ and SCDs.             INFECTIOUS DISEASE:     All surgical sites appear clean. No signs of active infection. Will monitor for fever and leukocytosis.           ENDOCRINE:     Stable – Monitor glucose fingersticks for goal 120-180.           Pertinent clinical, laboratory, radiographic, hemodynamic, echocardiographic, respiratory data, microbiologic data and chart were reviewed by myself and analyzed frequently throughout the course of the day and night by myself.     Plan discussed at length with the CTICU staff and Attending CT Surgeon -   Dr Trevin Chavez    Patient's status was discussed with patient at bedside.  CHIEF COMPLAINT: FOLLOW UP IN ICU FOR POSTOPERATIVE CARE OF PATIENT WHO IS S/P              PROCEDURES:         Flexible bronchoscopy. Significant subglottic stenosis visualized with concern for anterior cricoid cartilage weakness. 11-March-2022         ISSUES:       Stridor   Tracheal stenosis   Anterior left cricoid cartilage necrosis   DM   Glioblastoma diagnosed Jan 2022, s/p craniotomy         INTERVAL EVENTS:      Stable on heliox via nasal cannula. Minimal stridor today, breathing appears much improved compared to before  Blood glucose better controlled  Tolerating soft diet  ENT to f/u if patient will need tracheostomy for more definitive management for tracheal stenosis and cricoid cartilage necrosis        HISTORY:      Says breathing is improved         PHYSICAL EXAM:      Gen: Comfortable, No acute distress     Eyes: Sclera white, Conjunctiva normal, Eyelids normal, Pupils symmetrical      ENT: Mucous membranes moist,  minimal audible inspiratory stridor    Neck: Trachea midline,  ,  ,  ,  ,  ,       CV: Rate regular, Rhythm regular,  ,  ,       Resp: Breath sounds clear, No accessory muscles use, ,       Abd: Soft, Non-distended, Non-tender, Bowel sounds normal,  ,  ,       Skin: Warm, No peripheral edema of lower extremities,  ,       : No gonzalez     Neuro: Moving all 4 extremities,       Psych: A&Ox3               ASSESSMENT AND PLAN:           NEURO:     Post-operative Pain - Tylenol IV PRN.             RESPIRATORY:   Hypoxia - Humidified O2. Wean nasal cannula for goal O2sat above 92. Obtain CXR. Incentive spirometry. Chest PT and frequent suctioning. Continue bronchodilators. OOB to chair & ambulate w/ assistance. Continuous pulse oximetry for support & to prevent decompensation.     Heliox on stand by if needed. There is soft tissue swelling/ granulation tissue where anterior cricoid cartilage is necrotic however airway is patent and not completely occluded by this tissue. Should be intubatable. Cricothyroidotomy and trach kit at bedside    Keep HOB elevated, monitor for worsening stridor/respiratory distress      F/u ENT recs      CARDIOVASCULAR:     Hemodynamically stable - Not on pressors. Continue hemodynamic monitoring.     Telemetry (medical test) - Reviewed by me today independently. Normal sinus rhythm.             RENAL:     Stable - Monitor IOs and electrolytes. Keep K above 4.0 and Mg above 2.0.             GASTROINTESTINAL:     GI prophylaxis not indicated     Zofran and Reglan IV PRN for nausea     Soft diet, glucerna            HEMATOLOGIC:     No signs of active bleeding. Monitor Hgb in CBC in AM     DVT prophylaxis with heparin subQ and SCDs.             INFECTIOUS DISEASE:     All surgical sites appear clean. No signs of active infection. Will monitor for fever and leukocytosis.           ENDOCRINE:     Stable – Monitor glucose fingersticks for goal 120-180. Lantus, lispro with meals          Pertinent clinical, laboratory, radiographic, hemodynamic, echocardiographic, respiratory data, microbiologic data and chart were reviewed by myself and analyzed frequently throughout the course of the day and night by myself.     Plan discussed at length with the CTICU staff and Attending CT Surgeon -   Dr Jaime Arce    Patient's status was discussed with patient at bedside.     _________________________  VITAL SIGNS:  Vital Signs Last 24 Hrs  T(C): 37 (13 Mar 2022 08:00), Max: 37 (13 Mar 2022 00:00)  T(F): 98.6 (13 Mar 2022 08:00), Max: 98.6 (13 Mar 2022 00:00)  HR: 99 (13 Mar 2022 09:00) (68 - 99)  BP: 107/61 (13 Mar 2022 09:00) (92/54 - 122/95)  BP(mean): 72 (13 Mar 2022 09:00) (60 - 101)  RR: 16 (13 Mar 2022 09:00) (13 - 22)  SpO2: 95% (13 Mar 2022 09:00) (94% - 99%)  I/Os:   I&O's Detail    12 Mar 2022 06:01  -  13 Mar 2022 07:00  --------------------------------------------------------  IN:    IV PiggyBack: 200 mL    Lactated Ringers: 75 mL    Oral Fluid: 240 mL  Total IN: 515 mL    OUT:    Stool (mL): 1 mL    Voided (mL): 1350 mL  Total OUT: 1351 mL    Total NET: -836 mL      13 Mar 2022 07:01  -  13 Mar 2022 09:48  --------------------------------------------------------  IN:    IV PiggyBack: 700 mL  Total IN: 700 mL    OUT:    Voided (mL): 300 mL  Total OUT: 300 mL    Total NET: 400 mL              MEDICATIONS:  MEDICATIONS  (STANDING):  dextrose 40% Gel 15 Gram(s) Oral once  dextrose 5%. 1000 milliLiter(s) (50 mL/Hr) IV Continuous <Continuous>  dextrose 5%. 1000 milliLiter(s) (100 mL/Hr) IV Continuous <Continuous>  dextrose 50% Injectable 25 Gram(s) IV Push once  dextrose 50% Injectable 12.5 Gram(s) IV Push once  escitalopram 5 milliGRAM(s) Oral daily  fluticasone propionate 50 MICROgram(s)/spray Nasal Spray 1 Spray(s) Both Nostrils two times a day  gabapentin 100 milliGRAM(s) Oral at bedtime  heparin   Injectable 5000 Unit(s) SubCutaneous every 8 hours  influenza  Vaccine (HIGH DOSE) 0.7 milliLiter(s) IntraMuscular once  insulin glargine Injectable (LANTUS) 8 Unit(s) SubCutaneous at bedtime  insulin lispro (ADMELOG) corrective regimen sliding scale   SubCutaneous every 6 hours  lacosamide IVPB 100 milliGRAM(s) IV Intermittent every 12 hours  melatonin 3 milliGRAM(s) Oral at bedtime  pantoprazole  Injectable 40 milliGRAM(s) IV Push daily  sodium chloride 1 Gram(s) Oral two times a day    MEDICATIONS  (PRN):      LABS:  Laboratory data was independently reviewed by me today.                           11.0   8.43  )-----------( 127      ( 13 Mar 2022 05:15 )             33.9     03-13    137  |  99  |  5<L>  ----------------------------<  83  3.3<L>   |  29  |  0.52    Ca    8.3<L>      13 Mar 2022 05:15  Phos  2.3     03-13  Mg     1.60     03-13    TPro  6.6  /  Alb  3.3  /  TBili  0.6  /  DBili  x   /  AST  23  /  ALT  20  /  AlkPhos  100  03-11    LIVER FUNCTIONS - ( 11 Mar 2022 11:10 )  Alb: 3.3 g/dL / Pro: 6.6 g/dL / ALK PHOS: 100 U/L / ALT: 20 U/L / AST: 23 U/L / GGT: x           PT/INR - ( 11 Mar 2022 12:06 )   PT: 14.8 sec;   INR: 1.27 ratio         PTT - ( 11 Mar 2022 12:06 )  PTT:28.6 sec        RADIOLOGY:   Radiology images were independently reviewed by me today. Reports were reviewed by me today.    Xray Chest 1 View- PORTABLE-Routine:   ACC: [id number] EXAM:  XR CHEST PORTABLE ROUTINE 1V                          PROCEDURE DATE:  03/12/2022          INTERPRETATION:  TIME OF EXAM: March 12, 2022 at 5:27 AM.    CLINICAL INFORMATION: Tracheal stenosis.    COMPARISON:  March 11, 2022.    TECHNIQUE:   AP Portable chest x-ray.    INTERPRETATION:    The heart is not enlarged. The mediastinum is not accurately evaluated on   this image.  The right lung is clear.  There are new patchy left lower lung opacities. No pleural effusion or   pneumothorax is seen.  There is scoliosis of the spine.      IMPRESSION:  New patchy left lower lung opacities which can be due to   subsegmental atelectasis or developing pneumonia/aspiration.    --- End of Report ---            YOLIE LAMAR MD; Attending Radiologist  This document has been electronically signed. Mar 12 2022 12:38PM (03-12-22 @ 05:57)  Xray Chest 1 View- PORTABLE-Urgent:   ACC: [id number] EXAM:  XR CHEST PORTABLE URGENT 1V                          PROCEDURE DATE:  03/11/2022          INTERPRETATION:  CLINICAL INFORMATION: Dyspnea    Time of Exam:  March 11, 2022 at 12:04 PM    EXAM:  Frontal Chest    FINDINGS:  Both lungs are equally aerated and free of any focal abnormalities. The   heart is not enlarged and there is no effusion.        COMPARISON:  March 3, 2022        IMPRESSION:  No acute pulmonary disease.    --- End of Report ---            JOANNE BENNETT MD; Attending Radiologist  This document has been electronically signed. Mar 11 2022 12:15PM (03-11-22 @ 12:05)  Xray Chest 1 View- PORTABLE-Urgent:   ACC: [id number] EXAM:  XR CHEST PORTABLE URGENT 1V                          PROCEDURE DATE:  03/03/2022          INTERPRETATION:  EXAMINATION: XR CHEST URGENT    CLINICAL INDICATION: Postop status post tracheal dilatation.    TECHNIQUE: Single frontal, portable view of the chest was obtained.    COMPARISON: Chest radiograph 2/9/2022, CT chest 2/20/2022.    FINDINGS:    The heart is normal in size.  The lungs are clear.  There is no pneumothorax or pleural effusion.  No acute bony abnormalities.    IMPRESSION:  Clear lungs.    --- End of Report ---          LEONARDO SUNG DO; Resident Radiologist  This document has been electronically signed.  SHANE IRAHETA MD; Attending Radiologist  This document has been electronically signed. Mar  3 [id number] (03-03-22 @ 13:42)

## 2022-03-14 ENCOUNTER — NON-APPOINTMENT (OUTPATIENT)
Age: 67
End: 2022-03-14

## 2022-03-14 LAB
ANION GAP SERPL CALC-SCNC: 11 MMOL/L — SIGNIFICANT CHANGE UP (ref 7–14)
ANION GAP SERPL CALC-SCNC: 12 MMOL/L — SIGNIFICANT CHANGE UP (ref 7–14)
BUN SERPL-MCNC: 4 MG/DL — LOW (ref 7–23)
BUN SERPL-MCNC: 5 MG/DL — LOW (ref 7–23)
CALCIUM SERPL-MCNC: 8.5 MG/DL — SIGNIFICANT CHANGE UP (ref 8.4–10.5)
CALCIUM SERPL-MCNC: 8.6 MG/DL — SIGNIFICANT CHANGE UP (ref 8.4–10.5)
CHLORIDE SERPL-SCNC: 92 MMOL/L — LOW (ref 98–107)
CHLORIDE SERPL-SCNC: 93 MMOL/L — LOW (ref 98–107)
CO2 SERPL-SCNC: 25 MMOL/L — SIGNIFICANT CHANGE UP (ref 22–31)
CO2 SERPL-SCNC: 25 MMOL/L — SIGNIFICANT CHANGE UP (ref 22–31)
CREAT SERPL-MCNC: 0.61 MG/DL — SIGNIFICANT CHANGE UP (ref 0.5–1.3)
CREAT SERPL-MCNC: 0.64 MG/DL — SIGNIFICANT CHANGE UP (ref 0.5–1.3)
EGFR: 104 ML/MIN/1.73M2 — SIGNIFICANT CHANGE UP
EGFR: 106 ML/MIN/1.73M2 — SIGNIFICANT CHANGE UP
GLUCOSE BLDC GLUCOMTR-MCNC: 187 MG/DL — HIGH (ref 70–99)
GLUCOSE BLDC GLUCOMTR-MCNC: 217 MG/DL — HIGH (ref 70–99)
GLUCOSE BLDC GLUCOMTR-MCNC: 245 MG/DL — HIGH (ref 70–99)
GLUCOSE BLDC GLUCOMTR-MCNC: 250 MG/DL — HIGH (ref 70–99)
GLUCOSE SERPL-MCNC: 167 MG/DL — HIGH (ref 70–99)
GLUCOSE SERPL-MCNC: 253 MG/DL — HIGH (ref 70–99)
HCT VFR BLD CALC: 35.3 % — LOW (ref 39–50)
HGB BLD-MCNC: 11.5 G/DL — LOW (ref 13–17)
MAGNESIUM SERPL-MCNC: 1.6 MG/DL — SIGNIFICANT CHANGE UP (ref 1.6–2.6)
MCHC RBC-ENTMCNC: 29.9 PG — SIGNIFICANT CHANGE UP (ref 27–34)
MCHC RBC-ENTMCNC: 32.6 GM/DL — SIGNIFICANT CHANGE UP (ref 32–36)
MCV RBC AUTO: 91.9 FL — SIGNIFICANT CHANGE UP (ref 80–100)
NRBC # BLD: 0 /100 WBCS — SIGNIFICANT CHANGE UP
NRBC # FLD: 0.02 K/UL — HIGH
PHOSPHATE SERPL-MCNC: 2.9 MG/DL — SIGNIFICANT CHANGE UP (ref 2.5–4.5)
PLATELET # BLD AUTO: 138 K/UL — LOW (ref 150–400)
POTASSIUM SERPL-MCNC: 3.8 MMOL/L — SIGNIFICANT CHANGE UP (ref 3.5–5.3)
POTASSIUM SERPL-MCNC: 3.9 MMOL/L — SIGNIFICANT CHANGE UP (ref 3.5–5.3)
POTASSIUM SERPL-SCNC: 3.8 MMOL/L — SIGNIFICANT CHANGE UP (ref 3.5–5.3)
POTASSIUM SERPL-SCNC: 3.9 MMOL/L — SIGNIFICANT CHANGE UP (ref 3.5–5.3)
RBC # BLD: 3.84 M/UL — LOW (ref 4.2–5.8)
RBC # FLD: 13.1 % — SIGNIFICANT CHANGE UP (ref 10.3–14.5)
SODIUM SERPL-SCNC: 128 MMOL/L — LOW (ref 135–145)
SODIUM SERPL-SCNC: 130 MMOL/L — LOW (ref 135–145)
WBC # BLD: 6.83 K/UL — SIGNIFICANT CHANGE UP (ref 3.8–10.5)
WBC # FLD AUTO: 6.83 K/UL — SIGNIFICANT CHANGE UP (ref 3.8–10.5)

## 2022-03-14 PROCEDURE — 99233 SBSQ HOSP IP/OBS HIGH 50: CPT

## 2022-03-14 PROCEDURE — 71045 X-RAY EXAM CHEST 1 VIEW: CPT | Mod: 26

## 2022-03-14 RX ORDER — MAGNESIUM SULFATE 500 MG/ML
2 VIAL (ML) INJECTION ONCE
Refills: 0 | Status: COMPLETED | OUTPATIENT
Start: 2022-03-14 | End: 2022-03-14

## 2022-03-14 RX ORDER — METOPROLOL TARTRATE 50 MG
2.5 TABLET ORAL ONCE
Refills: 0 | Status: DISCONTINUED | OUTPATIENT
Start: 2022-03-14 | End: 2022-03-15

## 2022-03-14 RX ORDER — SODIUM CHLORIDE 9 MG/ML
500 INJECTION, SOLUTION INTRAVENOUS ONCE
Refills: 0 | Status: COMPLETED | OUTPATIENT
Start: 2022-03-14 | End: 2022-03-14

## 2022-03-14 RX ORDER — BENZOCAINE AND MENTHOL 5; 1 G/100ML; G/100ML
1 LIQUID ORAL THREE TIMES A DAY
Refills: 0 | Status: DISCONTINUED | OUTPATIENT
Start: 2022-03-14 | End: 2022-03-17

## 2022-03-14 RX ORDER — GLUCAGON INJECTION, SOLUTION 0.5 MG/.1ML
1 INJECTION, SOLUTION SUBCUTANEOUS ONCE
Refills: 0 | Status: DISCONTINUED | OUTPATIENT
Start: 2022-03-14 | End: 2022-03-16

## 2022-03-14 RX ORDER — LACOSAMIDE 50 MG/1
100 TABLET ORAL
Refills: 0 | Status: DISCONTINUED | OUTPATIENT
Start: 2022-03-14 | End: 2022-03-17

## 2022-03-14 RX ORDER — INSULIN LISPRO 100/ML
VIAL (ML) SUBCUTANEOUS
Refills: 0 | Status: DISCONTINUED | OUTPATIENT
Start: 2022-03-14 | End: 2022-03-22

## 2022-03-14 RX ORDER — INSULIN GLARGINE 100 [IU]/ML
12 INJECTION, SOLUTION SUBCUTANEOUS AT BEDTIME
Refills: 0 | Status: DISCONTINUED | OUTPATIENT
Start: 2022-03-14 | End: 2022-03-17

## 2022-03-14 RX ORDER — ACETAMINOPHEN 500 MG
1000 TABLET ORAL ONCE
Refills: 0 | Status: COMPLETED | OUTPATIENT
Start: 2022-03-14 | End: 2022-03-14

## 2022-03-14 RX ADMIN — Medication 3 MILLIGRAM(S): at 21:47

## 2022-03-14 RX ADMIN — Medication 4: at 13:51

## 2022-03-14 RX ADMIN — GABAPENTIN 100 MILLIGRAM(S): 400 CAPSULE ORAL at 21:47

## 2022-03-14 RX ADMIN — SODIUM CHLORIDE 1 GRAM(S): 9 INJECTION INTRAMUSCULAR; INTRAVENOUS; SUBCUTANEOUS at 06:10

## 2022-03-14 RX ADMIN — LACOSAMIDE 120 MILLIGRAM(S): 50 TABLET ORAL at 00:08

## 2022-03-14 RX ADMIN — Medication 2: at 07:10

## 2022-03-14 RX ADMIN — Medication 4: at 18:53

## 2022-03-14 RX ADMIN — ESCITALOPRAM OXALATE 5 MILLIGRAM(S): 10 TABLET, FILM COATED ORAL at 13:51

## 2022-03-14 RX ADMIN — Medication 1000 MILLIGRAM(S): at 14:07

## 2022-03-14 RX ADMIN — INSULIN GLARGINE 12 UNIT(S): 100 INJECTION, SOLUTION SUBCUTANEOUS at 21:47

## 2022-03-14 RX ADMIN — HEPARIN SODIUM 5000 UNIT(S): 5000 INJECTION INTRAVENOUS; SUBCUTANEOUS at 13:22

## 2022-03-14 RX ADMIN — HEPARIN SODIUM 5000 UNIT(S): 5000 INJECTION INTRAVENOUS; SUBCUTANEOUS at 21:47

## 2022-03-14 RX ADMIN — SODIUM CHLORIDE 1 GRAM(S): 9 INJECTION INTRAMUSCULAR; INTRAVENOUS; SUBCUTANEOUS at 17:10

## 2022-03-14 RX ADMIN — Medication 2.5 MILLIGRAM(S): at 23:30

## 2022-03-14 RX ADMIN — LACOSAMIDE 100 MILLIGRAM(S): 50 TABLET ORAL at 17:11

## 2022-03-14 RX ADMIN — PANTOPRAZOLE SODIUM 40 MILLIGRAM(S): 20 TABLET, DELAYED RELEASE ORAL at 13:23

## 2022-03-14 RX ADMIN — Medication 25 GRAM(S): at 06:26

## 2022-03-14 RX ADMIN — Medication 1 SPRAY(S): at 17:11

## 2022-03-14 RX ADMIN — SODIUM CHLORIDE 1500 MILLILITER(S): 9 INJECTION, SOLUTION INTRAVENOUS at 20:51

## 2022-03-14 RX ADMIN — Medication 1 SPRAY(S): at 06:09

## 2022-03-14 RX ADMIN — HEPARIN SODIUM 5000 UNIT(S): 5000 INJECTION INTRAVENOUS; SUBCUTANEOUS at 06:09

## 2022-03-14 RX ADMIN — Medication 2: at 21:48

## 2022-03-14 RX ADMIN — SODIUM CHLORIDE 1500 MILLILITER(S): 9 INJECTION, SOLUTION INTRAVENOUS at 23:32

## 2022-03-14 RX ADMIN — Medication 400 MILLIGRAM(S): at 13:51

## 2022-03-14 NOTE — PROGRESS NOTE ADULT - SUBJECTIVE AND OBJECTIVE BOX
CHIEF COMPLAINT: FOLLOW UP IN ICU FOR POSTOPERATIVE CARE OF PATIENT WHO IS S/P  Bronchoscopy for stridor            PROCEDURES:         Flexible bronchoscopy. Significant subglottic stenosis visualized with concern for anterior cricoid cartilage weakness. 11-March-2022         ISSUES:       Stridor   Tracheal stenosis   Anterior left cricoid cartilage necrosis   DM   Glioblastoma diagnosed Jan 2022, s/p craniotomy         INTERVAL EVENTS:      Off heliox.         HISTORY:      Says breathing was better yesterday compared to today.         PHYSICAL EXAM:      Gen: Comfortable, No acute distress     Eyes: Sclera white, Conjunctiva normal, Eyelids normal, Pupils symmetrical      ENT: Mucous membranes moist,  minimal audible inspiratory stridor    Neck: Trachea midline,  ,  ,  ,  ,  ,       CV: Rate regular, Rhythm regular,  ,  ,       Resp: Breath sounds clear, No accessory muscles use, ,       Abd: Soft, Non-distended, Non-tender, Bowel sounds normal,  ,  ,       Skin: Warm, No peripheral edema of lower extremities,  ,       : No gonzalez     Neuro: Moving all 4 extremities,       Psych: A&Ox3               ASSESSMENT AND PLAN:           NEURO:     Post-operative Pain - Tylenol IV PRN.             RESPIRATORY:   Hypoxia - Humidified O2. Wean nasal cannula for goal O2sat above 92. Obtain CXR. Incentive spirometry. Chest PT and frequent suctioning. Continue bronchodilators. OOB to chair & ambulate w/ assistance. Continuous pulse oximetry for support & to prevent decompensation.     Heliox on stand by if needed. There is soft tissue swelling/ granulation tissue where anterior cricoid cartilage is necrotic however airway is patent and not completely occluded by this tissue but reported to fall into airway occasionally as seen on bronch in OR.   Should be intubatable. Cricothyroidotomy and trach kit at bedside    Keep HOB elevated, monitor for worsening stridor/respiratory distress      F/u ENT recs      CARDIOVASCULAR:     Hemodynamically stable - Not on pressors. Continue hemodynamic monitoring.     Telemetry (medical test) - Reviewed by me today independently. Normal sinus rhythm.             RENAL:     Stable - Monitor IOs and electrolytes. Keep K above 4.0 and Mg above 2.0.             GASTROINTESTINAL:     GI prophylaxis not indicated     Zofran and Reglan IV PRN for nausea     Soft diet, glucerna            HEMATOLOGIC:     No signs of active bleeding. Monitor Hgb in CBC in AM     DVT prophylaxis with heparin subQ and SCDs.             INFECTIOUS DISEASE:     All surgical sites appear clean. No signs of active infection. Will monitor for fever and leukocytosis.           ENDOCRINE:     Stable – Monitor glucose fingersticks for goal 120-180. Lantus dose increased to 12 units from 8 units, lispro with meals          Pertinent clinical, laboratory, radiographic, hemodynamic, echocardiographic, respiratory data, microbiologic data and chart were reviewed by myself and analyzed frequently throughout the course of the day and night by myself.     Plan discussed at length with the CTICU staff and Attending CT Surgeon -   Dr Trevin Chavez    Patient's status was discussed with patient at bedside.     _________________________  VITAL SIGNS:  Vital Signs Last 24 Hrs  T(C): 37.1 (14 Mar 2022 08:00), Max: 38 (13 Mar 2022 16:00)  T(F): 98.7 (14 Mar 2022 08:00), Max: 100.4 (13 Mar 2022 16:00)  HR: 95 (14 Mar 2022 09:00) (86 - 112)  BP: 107/64 (14 Mar 2022 09:00) (93/45 - 117/69)  BP(mean): 75 (14 Mar 2022 09:00) (59 - 595)  RR: 16 (14 Mar 2022 09:00) (15 - 32)  SpO2: 95% (14 Mar 2022 09:00) (93% - 97%)  I/Os:   I&O's Detail    13 Mar 2022 07:01  -  14 Mar 2022 07:00  --------------------------------------------------------  IN:    IV PiggyBack: 800 mL  Total IN: 800 mL    OUT:    Voided (mL): 2675 mL  Total OUT: 2675 mL    Total NET: -1875 mL              MEDICATIONS:  MEDICATIONS  (STANDING):  dextrose 40% Gel 15 Gram(s) Oral once  dextrose 5%. 1000 milliLiter(s) (50 mL/Hr) IV Continuous <Continuous>  dextrose 5%. 1000 milliLiter(s) (100 mL/Hr) IV Continuous <Continuous>  dextrose 50% Injectable 25 Gram(s) IV Push once  dextrose 50% Injectable 12.5 Gram(s) IV Push once  escitalopram 5 milliGRAM(s) Oral daily  fluticasone propionate 50 MICROgram(s)/spray Nasal Spray 1 Spray(s) Both Nostrils two times a day  gabapentin 100 milliGRAM(s) Oral at bedtime  heparin   Injectable 5000 Unit(s) SubCutaneous every 8 hours  influenza  Vaccine (HIGH DOSE) 0.7 milliLiter(s) IntraMuscular once  insulin glargine Injectable (LANTUS) 12 Unit(s) SubCutaneous at bedtime  insulin lispro (ADMELOG) corrective regimen sliding scale   SubCutaneous every 6 hours  lacosamide IVPB 100 milliGRAM(s) IV Intermittent every 12 hours  melatonin 3 milliGRAM(s) Oral at bedtime  pantoprazole  Injectable 40 milliGRAM(s) IV Push daily  sodium chloride 1 Gram(s) Oral two times a day    MEDICATIONS  (PRN):      LABS:  Laboratory data was independently reviewed by me today.                           11.5   6.83  )-----------( 138      ( 14 Mar 2022 04:55 )             35.3     03-14    130<L>  |  93<L>  |  5<L>  ----------------------------<  253<H>  3.9   |  25  |  0.64    Ca    8.5      14 Mar 2022 07:37  Phos  2.9     03-14  Mg     1.60     03-14                RADIOLOGY:   Radiology images were independently reviewed by me today. Reports were reviewed by me today.    Xray Chest 1 View- PORTABLE-Routine:   ACC: [id number] EXAM:  XR CHEST PORTABLE ROUTINE 1V                          PROCEDURE DATE:  03/14/2022          INTERPRETATION:  TIME OF EXAM: March 14, 2022 at 5:36 AM    CLINICAL INFORMATION: Post bronchoscopy    TECHNIQUE:   Portable chest    INTERPRETATION:    No extra alveolar air as a complication of bronchoscopy.    The lungs are clear, the heart is not enlarged and there are no effusions.      COMPARISON:  March 13      IMPRESSION:  Status post bronchoscopy with clear lungs and no   complications from bronchoscopy.    --- End of Report ---            JOANNE BENNETT MD; Attending Radiologist  This document has been electronically signed. Mar 14 2022  7:34AM (03-14-22 @ 06:31)  Xray Chest 1 View- PORTABLE-Routine:   ACC: [id number] EXAM:  XR CHEST PORTABLE ROUTINE 1V                          PROCEDURE DATE:  03/13/2022          INTERPRETATION:  TIME OF EXAM: March 13, 2022 at 5:32 AM.    CLINICAL INFORMATION: Status post bronchoscopy. Follow-up.    COMPARISON:  March 12, 2022.    TECHNIQUE:   AP Portable chest x-ray. Lordotic and rotated.    INTERPRETATION:    Heart size and the mediastinum cannot be accurately evaluated on this   projection.  The lungs are clear. Previous patchy left lower lung opacities have   resolved.  No pleural effusion or pneumothorax seen.  No acute bony abnormality noted.      IMPRESSION:  With resolution of previous left lower lung patchy opacities.    No pneumothorax.    --- End of Report ---            YOLIE LAMAR MD; Attending Radiologist  This document has been electronically signed. Mar 13 2022 11:57AM (03-13-22 @ 08:27)  Xray Chest 1 View- PORTABLE-Routine:   ACC: [id number] EXAM:  XR CHEST PORTABLE ROUTINE 1V                          PROCEDURE DATE:  03/12/2022          INTERPRETATION:  TIME OF EXAM: March 12, 2022 at 5:27 AM.    CLINICAL INFORMATION: Tracheal stenosis.    COMPARISON:  March 11, 2022.    TECHNIQUE:   AP Portable chest x-ray.    INTERPRETATION:    The heart is not enlarged. The mediastinum is not accurately evaluated on   this image.  The right lung is clear.  There are new patchy left lower lung opacities. No pleural effusion or   pneumothorax is seen.  There is scoliosis of the spine.      IMPRESSION:  New patchy left lower lung opacities which can be due to   subsegmental atelectasis or developing pneumonia/aspiration.    --- End of Report ---            YOLIE LAMAR MD; Attending Radiologist  This document has been electronically signed. Mar 12 2022 12:38PM (03-12-22 @ 05:57)

## 2022-03-15 ENCOUNTER — APPOINTMENT (OUTPATIENT)
Dept: RADIATION ONCOLOGY | Facility: CLINIC | Age: 67
End: 2022-03-15
Payer: MEDICARE

## 2022-03-15 ENCOUNTER — NON-APPOINTMENT (OUTPATIENT)
Age: 67
End: 2022-03-15

## 2022-03-15 LAB
ANION GAP SERPL CALC-SCNC: 12 MMOL/L — SIGNIFICANT CHANGE UP (ref 7–14)
BUN SERPL-MCNC: 7 MG/DL — SIGNIFICANT CHANGE UP (ref 7–23)
CALCIUM SERPL-MCNC: 9 MG/DL — SIGNIFICANT CHANGE UP (ref 8.4–10.5)
CHLORIDE SERPL-SCNC: 96 MMOL/L — LOW (ref 98–107)
CO2 SERPL-SCNC: 24 MMOL/L — SIGNIFICANT CHANGE UP (ref 22–31)
CREAT SERPL-MCNC: 0.68 MG/DL — SIGNIFICANT CHANGE UP (ref 0.5–1.3)
EGFR: 103 ML/MIN/1.73M2 — SIGNIFICANT CHANGE UP
GLUCOSE BLDC GLUCOMTR-MCNC: 143 MG/DL — HIGH (ref 70–99)
GLUCOSE BLDC GLUCOMTR-MCNC: 185 MG/DL — HIGH (ref 70–99)
GLUCOSE BLDC GLUCOMTR-MCNC: 187 MG/DL — HIGH (ref 70–99)
GLUCOSE BLDC GLUCOMTR-MCNC: 210 MG/DL — HIGH (ref 70–99)
GLUCOSE SERPL-MCNC: 179 MG/DL — HIGH (ref 70–99)
HCT VFR BLD CALC: 37.5 % — LOW (ref 39–50)
HGB BLD-MCNC: 12.2 G/DL — LOW (ref 13–17)
MAGNESIUM SERPL-MCNC: 1.6 MG/DL — SIGNIFICANT CHANGE UP (ref 1.6–2.6)
MCHC RBC-ENTMCNC: 30.1 PG — SIGNIFICANT CHANGE UP (ref 27–34)
MCHC RBC-ENTMCNC: 32.5 GM/DL — SIGNIFICANT CHANGE UP (ref 32–36)
MCV RBC AUTO: 92.6 FL — SIGNIFICANT CHANGE UP (ref 80–100)
NRBC # BLD: 0 /100 WBCS — SIGNIFICANT CHANGE UP
NRBC # FLD: 0.02 K/UL — HIGH
PHOSPHATE SERPL-MCNC: 2.5 MG/DL — SIGNIFICANT CHANGE UP (ref 2.5–4.5)
PLATELET # BLD AUTO: 163 K/UL — SIGNIFICANT CHANGE UP (ref 150–400)
POTASSIUM SERPL-MCNC: 4.3 MMOL/L — SIGNIFICANT CHANGE UP (ref 3.5–5.3)
POTASSIUM SERPL-SCNC: 4.3 MMOL/L — SIGNIFICANT CHANGE UP (ref 3.5–5.3)
RBC # BLD: 4.05 M/UL — LOW (ref 4.2–5.8)
RBC # FLD: 13 % — SIGNIFICANT CHANGE UP (ref 10.3–14.5)
SODIUM SERPL-SCNC: 132 MMOL/L — LOW (ref 135–145)
SURGICAL PATHOLOGY STUDY: SIGNIFICANT CHANGE UP
WBC # BLD: 9.6 K/UL — SIGNIFICANT CHANGE UP (ref 3.8–10.5)
WBC # FLD AUTO: 9.6 K/UL — SIGNIFICANT CHANGE UP (ref 3.8–10.5)

## 2022-03-15 PROCEDURE — 99233 SBSQ HOSP IP/OBS HIGH 50: CPT

## 2022-03-15 RX ORDER — INSULIN LISPRO 100/ML
2 VIAL (ML) SUBCUTANEOUS
Refills: 0 | Status: DISCONTINUED | OUTPATIENT
Start: 2022-03-15 | End: 2022-03-17

## 2022-03-15 RX ORDER — ALBUMIN HUMAN 25 %
250 VIAL (ML) INTRAVENOUS
Refills: 0 | Status: COMPLETED | OUTPATIENT
Start: 2022-03-15 | End: 2022-03-15

## 2022-03-15 RX ORDER — METOPROLOL TARTRATE 50 MG
12.5 TABLET ORAL
Refills: 0 | Status: DISCONTINUED | OUTPATIENT
Start: 2022-03-15 | End: 2022-03-16

## 2022-03-15 RX ORDER — MAGNESIUM SULFATE 500 MG/ML
2 VIAL (ML) INJECTION ONCE
Refills: 0 | Status: COMPLETED | OUTPATIENT
Start: 2022-03-15 | End: 2022-03-15

## 2022-03-15 RX ORDER — METOPROLOL TARTRATE 50 MG
2.5 TABLET ORAL ONCE
Refills: 0 | Status: COMPLETED | OUTPATIENT
Start: 2022-03-15 | End: 2022-03-14

## 2022-03-15 RX ORDER — METOPROLOL TARTRATE 50 MG
25 TABLET ORAL
Refills: 0 | Status: DISCONTINUED | OUTPATIENT
Start: 2022-03-15 | End: 2022-03-15

## 2022-03-15 RX ORDER — LEVALBUTEROL 1.25 MG/.5ML
0.63 SOLUTION, CONCENTRATE RESPIRATORY (INHALATION) EVERY 6 HOURS
Refills: 0 | Status: DISCONTINUED | OUTPATIENT
Start: 2022-03-15 | End: 2022-03-22

## 2022-03-15 RX ORDER — SODIUM CHLORIDE 9 MG/ML
500 INJECTION, SOLUTION INTRAVENOUS ONCE
Refills: 0 | Status: COMPLETED | OUTPATIENT
Start: 2022-03-15 | End: 2022-03-15

## 2022-03-15 RX ADMIN — Medication 250 MILLILITER(S): at 12:34

## 2022-03-15 RX ADMIN — SODIUM CHLORIDE 1 GRAM(S): 9 INJECTION INTRAMUSCULAR; INTRAVENOUS; SUBCUTANEOUS at 05:54

## 2022-03-15 RX ADMIN — Medication 12.5 MILLIGRAM(S): at 18:19

## 2022-03-15 RX ADMIN — LACOSAMIDE 100 MILLIGRAM(S): 50 TABLET ORAL at 05:54

## 2022-03-15 RX ADMIN — Medication 2: at 16:52

## 2022-03-15 RX ADMIN — ESCITALOPRAM OXALATE 5 MILLIGRAM(S): 10 TABLET, FILM COATED ORAL at 12:14

## 2022-03-15 RX ADMIN — HEPARIN SODIUM 5000 UNIT(S): 5000 INJECTION INTRAVENOUS; SUBCUTANEOUS at 21:06

## 2022-03-15 RX ADMIN — Medication 250 MILLILITER(S): at 13:32

## 2022-03-15 RX ADMIN — LEVALBUTEROL 0.63 MILLIGRAM(S): 1.25 SOLUTION, CONCENTRATE RESPIRATORY (INHALATION) at 15:00

## 2022-03-15 RX ADMIN — Medication 2 UNIT(S): at 08:25

## 2022-03-15 RX ADMIN — LACOSAMIDE 100 MILLIGRAM(S): 50 TABLET ORAL at 18:19

## 2022-03-15 RX ADMIN — PANTOPRAZOLE SODIUM 40 MILLIGRAM(S): 20 TABLET, DELAYED RELEASE ORAL at 12:14

## 2022-03-15 RX ADMIN — Medication 2 UNIT(S): at 12:13

## 2022-03-15 RX ADMIN — Medication 1 SPRAY(S): at 06:46

## 2022-03-15 RX ADMIN — Medication 1: at 12:13

## 2022-03-15 RX ADMIN — Medication 25 GRAM(S): at 05:54

## 2022-03-15 RX ADMIN — SODIUM CHLORIDE 1500 MILLILITER(S): 9 INJECTION, SOLUTION INTRAVENOUS at 07:06

## 2022-03-15 RX ADMIN — HEPARIN SODIUM 5000 UNIT(S): 5000 INJECTION INTRAVENOUS; SUBCUTANEOUS at 05:54

## 2022-03-15 RX ADMIN — Medication 1 SPRAY(S): at 18:19

## 2022-03-15 RX ADMIN — Medication 2 UNIT(S): at 16:52

## 2022-03-15 RX ADMIN — INSULIN GLARGINE 12 UNIT(S): 100 INJECTION, SOLUTION SUBCUTANEOUS at 21:06

## 2022-03-15 RX ADMIN — GABAPENTIN 100 MILLIGRAM(S): 400 CAPSULE ORAL at 21:09

## 2022-03-15 RX ADMIN — LEVALBUTEROL 0.63 MILLIGRAM(S): 1.25 SOLUTION, CONCENTRATE RESPIRATORY (INHALATION) at 22:44

## 2022-03-15 RX ADMIN — SODIUM CHLORIDE 1 GRAM(S): 9 INJECTION INTRAMUSCULAR; INTRAVENOUS; SUBCUTANEOUS at 18:19

## 2022-03-15 RX ADMIN — Medication 3 MILLIGRAM(S): at 21:08

## 2022-03-15 RX ADMIN — HEPARIN SODIUM 5000 UNIT(S): 5000 INJECTION INTRAVENOUS; SUBCUTANEOUS at 15:08

## 2022-03-15 NOTE — PROGRESS NOTE ADULT - SUBJECTIVE AND OBJECTIVE BOX
JORDAN CHAKRABORTY      67y   Male   MRN-[id number]         No Known Allergies             Daily     Daily Drug Dosing Weight  Height (cm): 170.2 (11 Mar 2022 10:00)  Weight (kg): 64.6 (12 Mar 2022 20:00)  BMI (kg/m2): 22.3 (12 Mar 2022 20:00)  BSA (m2): 1.75 (12 Mar 2022 20:00)    68 y/o M with PMHx of recently dx Grade 4 glioblastoma s/p craniotomy, HTN, T2DM, and recent hospitalization at [facility] for tracheal stenosis p/w severe throat pain, difficulty breathing, and productive cough. Pt discharged on 3/3 after tracheal balloon dilation and biopsy. 4 days ago (5 days post-discharge), pt started having severe 10/10 throat pain, worst under the chin but extending down to the rest of the throat, and having difficulty breathing. Throat pain is worsened with swallowing. Also endorses cough productive of yellow sputum, which has been keeping pt up at night. Denies fever, chills, chest pain, abdominal pain, N/V. Has been tolerating PO intake.  Pt had on 3/2/22 a Bronch, balloon dilation and tracheal biopsy with Dr Garland. Post procedure pt's symptoms improved and pt was eventually discharged to home by medical service with plans for outpt follow up with DR Goldman. OR pathology showed no malignancy. Today pt presents to [facility] ER with worsening cough, SOB, and neck pain x 4 days. Pt seen with DR Garland. Pt has audible inspiratory stridor with some SOB during exam. Daughter and bedside who confirms symptoms. ASsessment done with Dr. Garland speaking to pt and pt's daughter in [place]. Pt looks to be in mild resp distress. On exam coarse wheezing heard throughout. Pt to be admitted to Thoracic surgery, CTICU with plans for urgent add on Bronchoscopy today with DR Chavez or DR. Garland.  (11 Mar 2022 11:36)    Procedure: Flexible bronchoscopy. Significant subglottic stenosis visualized with concern for anterior cricoid cartilage weakness. 11-March-2022                       Issues:               Tracheal stenosis               Stridor   Anterior left cricoid cartilage necrosis   DM   Glioblastoma  s/p craniotomy                Home Medications:  acetaminophen 325 mg oral tablet: 2 tab(s) orally every 6 hours, As needed, Mild Pain (1 - 3), Moderate Pain (4 - 6) (03 Mar 2022 18:11)  melatonin 3 mg oral tablet: 1 tab(s) orally once a day (at bedtime) (01 Mar 2022 03:19)  polyethylene glycol 3350 oral powder for reconstitution: 17 gram(s) orally every 12 hours, As Needed for constipation (if no BM x 2 days).  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    Type 2 diabetes mellitus    S/P craniotomy      Vital Signs Last 24 Hrs  T(C): 37 (15 Mar 2022 08:00), Max: 37.4 (15 Mar 2022 04:00)  T(F): 98.6 (15 Mar 2022 08:00), Max: 99.4 (15 Mar 2022 04:00)  HR: 86 (15 Mar 2022 09:00) (80 - 140)  BP: 94/53 (15 Mar 2022 08:00) (84/51 - 130/77)  BP(mean): 63 (15 Mar 2022 08:00) (57 - 89)  RR: 16 (15 Mar 2022 09:00) (15 - 26)  SpO2: 96% (15 Mar 2022 09:00) (91% - 98%)  I&O's Detail    14 Mar 2022 07:01  -  15 Mar 2022 07:00  --------------------------------------------------------  IN:    IV PiggyBack: 50 mL    Lactated Ringers Bolus: 1000 mL    Oral Fluid: 1080 mL  Total IN: 2130 mL    OUT:    Voided (mL): 1050 mL  Total OUT: 1050 mL    Total NET: 1080 mL      15 Mar 2022 07:01  -  15 Mar 2022 09:44  --------------------------------------------------------  IN:    Lactated Ringers Bolus: 500 mL  Total IN: 500 mL    OUT:  Total OUT: 0 mL    Total NET: 500 mL        CAPILLARY BLOOD GLUCOSE      POCT Blood Glucose.: 143 mg/dL (15 Mar 2022 08:23)  POCT Blood Glucose.: 217 mg/dL (14 Mar 2022 21:46)  POCT Blood Glucose.: 245 mg/dL (14 Mar 2022 18:52)  POCT Blood Glucose.: 250 mg/dL (14 Mar 2022 13:40)    Home Medications:  acetaminophen 325 mg oral tablet: 2 tab(s) orally every 6 hours, As needed, Mild Pain (1 - 3), Moderate Pain (4 - 6) (03 Mar 2022 18:11)  melatonin 3 mg oral tablet: 1 tab(s) orally once a day (at bedtime) (01 Mar 2022 03:19)  polyethylene glycol 3350 oral powder for reconstitution: 17 gram(s) orally every 12 hours, As Needed for constipation (if no BM x 2 days).  (01 Mar 2022 03:19)  senna oral tablet: 2 tab(s) orally once a day (at bedtime) (01 Mar 2022 03:19)  Vimpat 100 mg oral tablet: 1 tab(s) orally 2 times a day (01 Mar 2022 03:19)    MEDICATIONS  (STANDING):  dextrose 40% Gel 15 Gram(s) Oral once  dextrose 5%. 1000 milliLiter(s) (50 mL/Hr) IV Continuous <Continuous>  dextrose 5%. 1000 milliLiter(s) (100 mL/Hr) IV Continuous <Continuous>  dextrose 50% Injectable 25 Gram(s) IV Push once  dextrose 50% Injectable 12.5 Gram(s) IV Push once  escitalopram 5 milliGRAM(s) Oral daily  fluticasone propionate 50 MICROgram(s)/spray Nasal Spray 1 Spray(s) Both Nostrils two times a day  gabapentin 100 milliGRAM(s) Oral at bedtime  glucagon  Injectable 1 milliGRAM(s) IntraMuscular once  heparin   Injectable 5000 Unit(s) SubCutaneous every 8 hours  influenza  Vaccine (HIGH DOSE) 0.7 milliLiter(s) IntraMuscular once  insulin glargine Injectable (LANTUS) 12 Unit(s) SubCutaneous at bedtime  insulin lispro (ADMELOG) corrective regimen sliding scale   SubCutaneous three times a day before meals  insulin lispro Injectable (ADMELOG) 2 Unit(s) SubCutaneous before breakfast  insulin lispro Injectable (ADMELOG) 2 Unit(s) SubCutaneous before lunch  insulin lispro Injectable (ADMELOG) 2 Unit(s) SubCutaneous before dinner  lacosamide 100 milliGRAM(s) Oral two times a day  melatonin 3 milliGRAM(s) Oral at bedtime  metoprolol tartrate 12.5 milliGRAM(s) Oral two times a day  pantoprazole  Injectable 40 milliGRAM(s) IV Push daily  sodium chloride 1 Gram(s) Oral two times a day    MEDICATIONS  (PRN):  benzocaine 15 mG/menthol 3.6 mG Lozenge 1 Lozenge Oral three times a day PRN Sore Throat        Physical exam:   General:               Pt is awake, alert,  appears to becomfortable                                                  Neuro:                  Nonfocal                             Cardiovascular:   S1 & S2, regular                           Respiratory:         Air entry is fair and equal on both sides, lungs are clear                          GI:                          Soft, nondistended and nontender, Bowel sounds active                            Ext:                        No cyanosis or edema                              Labs:                                                                           12.2   9.60  )-----------( 163      ( 15 Mar 2022 02:55 )             37.5             03-15    132<L>  |  96<L>  |  7   ----------------------------<  179<H>  4.3   |  24  |  0.68    Ca    9.0      15 Mar 2022 02:55  Phos  2.5     03-15  Mg     1.60     03-15        CXR:  < from: Xray Chest 1 View- PORTABLE-Routine (Xray Chest 1 View- PORTABLE-Routine in AM.) (03.14.22 @ 06:31) >    IMPRESSION:  Status post bronchoscopy with clear lungs and no   complications from bronchoscopy.        Plan:  General: 67yMale s/p Flexible bronchoscopy. Significant subglottic stenosis visualized with concern for anterior cricoid cartilage weakness. 11-March-2022, fairly comfortable on Heliox.                             Neuro:                                         Pain control with   Tylenol PRN    glioblastoma s/p craniotomy - Continue Vimpat for seizure prophylaxis                            Cardiovascular:                                          Continue hemodynamic monitoring.  Was tachy overnight, with flat IVC on US, received 1L IVF. Currently in NSR with HR in 90s  Continue Low dose Lopressor 12.5mg BID                            Respiratory:                                         Stridor / Tracheal stenosis: Off Heliox                                          Comfortable, not in any distress.                                                             Continue bronchodilators, pulmonary toilet- PRN     Scheduled for tracheostomy on Thursday                            GI                                         On soft diet and Glucerna shakes                                         Continue Zofran / Reglan for nausea - PRN                                         Continue bowel regimen	                                                                 Renal:                                         Continue LR  30cc/hr                                         Monitor I/Os and electrolytes.                                                                                         Hem/ Onc:                                         DVT prophylaxis with SQ Heparin and SCDs                                         Follow CBC in AM                           Infectious disease:                                            Monitor for fever / leukocytosis.                                          All surgical incision / chest tube  sites look clean                            Endocrine                                            Continue Accu-Checks with coverage       Pertinent clinical, laboratory, radiographic, hemodynamic, echocardiographic, respiratory data, microbiologic data and chart were reviewed and analyzed frequently throughout the course of the day and night  Patient seen, examined and plan discussed with CT Surgeon Dr. Garland / CTICU team during rounds.    OOB to chair and ambulate as tolerated.     I have spent 40 minutes of time with this patient including 20 minutes coordinating care in the ICU and updating family members.         Miky Munoz MD

## 2022-03-16 ENCOUNTER — TRANSCRIPTION ENCOUNTER (OUTPATIENT)
Age: 67
End: 2022-03-16

## 2022-03-16 LAB
ANION GAP SERPL CALC-SCNC: 11 MMOL/L — SIGNIFICANT CHANGE UP (ref 7–14)
BLD GP AB SCN SERPL QL: NEGATIVE — SIGNIFICANT CHANGE UP
BUN SERPL-MCNC: 6 MG/DL — LOW (ref 7–23)
CA-I BLD-SCNC: 1.1 MMOL/L — LOW (ref 1.15–1.29)
CALCIUM SERPL-MCNC: 8.9 MG/DL — SIGNIFICANT CHANGE UP (ref 8.4–10.5)
CHLORIDE SERPL-SCNC: 95 MMOL/L — LOW (ref 98–107)
CO2 SERPL-SCNC: 25 MMOL/L — SIGNIFICANT CHANGE UP (ref 22–31)
CREAT SERPL-MCNC: 0.58 MG/DL — SIGNIFICANT CHANGE UP (ref 0.5–1.3)
EGFR: 108 ML/MIN/1.73M2 — SIGNIFICANT CHANGE UP
GLUCOSE BLDC GLUCOMTR-MCNC: 164 MG/DL — HIGH (ref 70–99)
GLUCOSE BLDC GLUCOMTR-MCNC: 191 MG/DL — HIGH (ref 70–99)
GLUCOSE BLDC GLUCOMTR-MCNC: 279 MG/DL — HIGH (ref 70–99)
GLUCOSE BLDC GLUCOMTR-MCNC: 316 MG/DL — HIGH (ref 70–99)
GLUCOSE SERPL-MCNC: 214 MG/DL — HIGH (ref 70–99)
HCT VFR BLD CALC: 33.7 % — LOW (ref 39–50)
HGB BLD-MCNC: 11 G/DL — LOW (ref 13–17)
MAGNESIUM SERPL-MCNC: 1.7 MG/DL — SIGNIFICANT CHANGE UP (ref 1.6–2.6)
MCHC RBC-ENTMCNC: 30.1 PG — SIGNIFICANT CHANGE UP (ref 27–34)
MCHC RBC-ENTMCNC: 32.6 GM/DL — SIGNIFICANT CHANGE UP (ref 32–36)
MCV RBC AUTO: 92.3 FL — SIGNIFICANT CHANGE UP (ref 80–100)
NRBC # BLD: 0 /100 WBCS — SIGNIFICANT CHANGE UP
NRBC # FLD: 0 K/UL — SIGNIFICANT CHANGE UP
PHOSPHATE SERPL-MCNC: 3 MG/DL — SIGNIFICANT CHANGE UP (ref 2.5–4.5)
PLATELET # BLD AUTO: 149 K/UL — LOW (ref 150–400)
POTASSIUM SERPL-MCNC: 4.1 MMOL/L — SIGNIFICANT CHANGE UP (ref 3.5–5.3)
POTASSIUM SERPL-SCNC: 4.1 MMOL/L — SIGNIFICANT CHANGE UP (ref 3.5–5.3)
RBC # BLD: 3.65 M/UL — LOW (ref 4.2–5.8)
RBC # FLD: 12.7 % — SIGNIFICANT CHANGE UP (ref 10.3–14.5)
RH IG SCN BLD-IMP: POSITIVE — SIGNIFICANT CHANGE UP
SARS-COV-2 RNA SPEC QL NAA+PROBE: SIGNIFICANT CHANGE UP
SODIUM SERPL-SCNC: 131 MMOL/L — LOW (ref 135–145)
WBC # BLD: 7.11 K/UL — SIGNIFICANT CHANGE UP (ref 3.8–10.5)
WBC # FLD AUTO: 7.11 K/UL — SIGNIFICANT CHANGE UP (ref 3.8–10.5)

## 2022-03-16 PROCEDURE — 71045 X-RAY EXAM CHEST 1 VIEW: CPT | Mod: 26

## 2022-03-16 PROCEDURE — 99233 SBSQ HOSP IP/OBS HIGH 50: CPT

## 2022-03-16 RX ORDER — ALBUMIN HUMAN 25 %
250 VIAL (ML) INTRAVENOUS ONCE
Refills: 0 | Status: COMPLETED | OUTPATIENT
Start: 2022-03-16 | End: 2022-03-16

## 2022-03-16 RX ORDER — TETRACAINE/BENZOCAINE/BUTAMBEN 2%-14%-2%
1 OINTMENT (GRAM) TOPICAL
Refills: 0 | Status: DISCONTINUED | OUTPATIENT
Start: 2022-03-16 | End: 2022-03-22

## 2022-03-16 RX ORDER — ALBUMIN HUMAN 25 %
250 VIAL (ML) INTRAVENOUS ONCE
Refills: 0 | Status: DISCONTINUED | OUTPATIENT
Start: 2022-03-16 | End: 2022-03-16

## 2022-03-16 RX ORDER — MAGNESIUM SULFATE 500 MG/ML
2 VIAL (ML) INJECTION ONCE
Refills: 0 | Status: COMPLETED | OUTPATIENT
Start: 2022-03-16 | End: 2022-03-16

## 2022-03-16 RX ORDER — CALCIUM GLUCONATE 100 MG/ML
1 VIAL (ML) INTRAVENOUS ONCE
Refills: 0 | Status: COMPLETED | OUTPATIENT
Start: 2022-03-16 | End: 2022-03-16

## 2022-03-16 RX ADMIN — SODIUM CHLORIDE 1 GRAM(S): 9 INJECTION INTRAMUSCULAR; INTRAVENOUS; SUBCUTANEOUS at 17:13

## 2022-03-16 RX ADMIN — Medication 1 SPRAY(S): at 17:13

## 2022-03-16 RX ADMIN — Medication 4: at 11:57

## 2022-03-16 RX ADMIN — Medication 3 MILLIGRAM(S): at 21:49

## 2022-03-16 RX ADMIN — Medication 1: at 17:14

## 2022-03-16 RX ADMIN — LACOSAMIDE 100 MILLIGRAM(S): 50 TABLET ORAL at 06:11

## 2022-03-16 RX ADMIN — Medication 25 GRAM(S): at 06:25

## 2022-03-16 RX ADMIN — LEVALBUTEROL 0.63 MILLIGRAM(S): 1.25 SOLUTION, CONCENTRATE RESPIRATORY (INHALATION) at 15:31

## 2022-03-16 RX ADMIN — SODIUM CHLORIDE 1 GRAM(S): 9 INJECTION INTRAMUSCULAR; INTRAVENOUS; SUBCUTANEOUS at 06:11

## 2022-03-16 RX ADMIN — LEVALBUTEROL 0.63 MILLIGRAM(S): 1.25 SOLUTION, CONCENTRATE RESPIRATORY (INHALATION) at 09:37

## 2022-03-16 RX ADMIN — Medication 250 MILLILITER(S): at 13:33

## 2022-03-16 RX ADMIN — Medication 2 UNIT(S): at 17:14

## 2022-03-16 RX ADMIN — HEPARIN SODIUM 5000 UNIT(S): 5000 INJECTION INTRAVENOUS; SUBCUTANEOUS at 13:32

## 2022-03-16 RX ADMIN — LEVALBUTEROL 0.63 MILLIGRAM(S): 1.25 SOLUTION, CONCENTRATE RESPIRATORY (INHALATION) at 04:08

## 2022-03-16 RX ADMIN — Medication 250 MILLILITER(S): at 09:28

## 2022-03-16 RX ADMIN — PANTOPRAZOLE SODIUM 40 MILLIGRAM(S): 20 TABLET, DELAYED RELEASE ORAL at 11:57

## 2022-03-16 RX ADMIN — HEPARIN SODIUM 5000 UNIT(S): 5000 INJECTION INTRAVENOUS; SUBCUTANEOUS at 06:10

## 2022-03-16 RX ADMIN — Medication 100 GRAM(S): at 15:25

## 2022-03-16 RX ADMIN — Medication 2 UNIT(S): at 07:59

## 2022-03-16 RX ADMIN — Medication 1: at 07:59

## 2022-03-16 RX ADMIN — HEPARIN SODIUM 5000 UNIT(S): 5000 INJECTION INTRAVENOUS; SUBCUTANEOUS at 21:49

## 2022-03-16 RX ADMIN — ESCITALOPRAM OXALATE 5 MILLIGRAM(S): 10 TABLET, FILM COATED ORAL at 11:57

## 2022-03-16 RX ADMIN — LACOSAMIDE 100 MILLIGRAM(S): 50 TABLET ORAL at 17:13

## 2022-03-16 RX ADMIN — Medication 250 MILLILITER(S): at 22:30

## 2022-03-16 RX ADMIN — Medication 250 MILLILITER(S): at 08:30

## 2022-03-16 RX ADMIN — Medication 250 MILLILITER(S): at 11:30

## 2022-03-16 RX ADMIN — LEVALBUTEROL 0.63 MILLIGRAM(S): 1.25 SOLUTION, CONCENTRATE RESPIRATORY (INHALATION) at 21:24

## 2022-03-16 RX ADMIN — Medication 1 SPRAY(S): at 06:09

## 2022-03-16 RX ADMIN — GABAPENTIN 100 MILLIGRAM(S): 400 CAPSULE ORAL at 21:49

## 2022-03-16 RX ADMIN — INSULIN GLARGINE 12 UNIT(S): 100 INJECTION, SOLUTION SUBCUTANEOUS at 21:48

## 2022-03-16 RX ADMIN — Medication 1 SPRAY(S): at 21:05

## 2022-03-16 RX ADMIN — Medication 2 UNIT(S): at 11:56

## 2022-03-16 NOTE — DIETITIAN INITIAL EVALUATION ADULT. - OTHER INFO
66 y/o [place]i speaking male admitted with dx Tracheal stenosis and Stridor. Visited with pt to obtain nutrition hx. Pt understands and can converse in English. He said he has had issues of pain with swallowing since his prior hospitalization earlier this month. His symptoms have affected his oral intake, especially immediately prior to admission, as his pain upon swallowing was severe. He was not certain about his weight status, however [facility] records indicate that he had a recorded weight of 74.4 kg in February 2022 indicating a 13% weight loss over the past month. Pt's poor oral intake with noted loss of weight presents him at severe risk for malnutrition. Pt now tolerating ordered diet and eating well. He is drinking Glucerna supplement as ordered. No GI distress at present with last BM 3/15. No edema nor pressure injuries identified at this time.  - 316 mg/dl over the past 24 hrs with Lantus and Admelog insulins ordered for coverage. Pt had been seen for diabetes education during his 2/22 admission; reinforced therapeutic diet principles. Encourage and monitor oral intake, especially of nutrition supplement. RDN services to remain available as needed.

## 2022-03-16 NOTE — DIETITIAN INITIAL EVALUATION ADULT. - PERTINENT LABORATORY DATA
03-16 Na 131 mmol/L<L> Glu 214 mg/dL<H> K+ 4.1 mmol/L Cr 0.58 mg/dL BUN 6 mg/dL<L> Phos 3.0 mg/dL  03-16 @ 11:37 POCT 316 mg/dL  03-16 @ 07:55 POCT 191 mg/dL  03-15 @ 21:03 POCT 185 mg/dL  03-15 @ 16:50 POCT 210 mg/dL

## 2022-03-16 NOTE — PROGRESS NOTE ADULT - SUBJECTIVE AND OBJECTIVE BOX
CHIEF COMPLAINT: FOLLOW UP IN ICU FOR CARE OF PATIENT WHO HAS TRACHEAL STENOSIS      PROCEDURES:     Flexible bronchoscopy. Significant subglottic stenosis visualized with concern for anterior cricoid cartilage weakness. 11-March-2022   Craniotomy, with supratentorial neoplasm excision 27-Jan-2022    ISSUES:   Hypotension  Grade 4 Glioblastoma of Brain s/p Craniotomy (1/2022)  Tracheal stenosis  Anterior left cricoid cartilage necrosis  DM2  Fever    INTERVAL EVENTS:   Orthostatic hypotension this AM  Bedside ultrasound found flat IVC  Given IVF with improvement of BP    HISTORY:   Patient denies dyspnea and stridor. Denies chest pain, cough, pleuritic pain, fever. Denies abdominal pain, nausea, or vomiting.    PHYSICAL EXAM:   Gen: Comfortable, No acute distress  Eyes: Sclera white, Conjunctiva normal, Eyelids normal, Pupils symmetrical   ENT: Mucous membranes moist,  ,  ,    Neck: Trachea midline,  ,  ,  ,  ,  ,    CV: Rate regular, Rhythm regular,  ,  ,    Resp: Breath sounds clear, No accessory muscles use,  ,  ,    Abd: Soft, Non-distended, Non-tender, Bowel sounds normal,  ,  ,    Skin: Warm, No peripheral edema of lower extremities,  ,    : No gonzalez  Neuro: Moving all 4 extremities,    Psych: A&Ox3      ASSESSMENT AND PLAN:     NEURO:  No pain issues.        Seizure disorder - stable. continue antiepileptics       RESPIRATORY:  Hypoxia - Wean nasal cannula for goal O2sat above 92. Obtain CXR. Incentive spirometry. Chest PT and frequent suctioning. Continue bronchodilators. OOB to chair & ambulate w/ assistance. Continuous pulse oximetry for support & to prevent decompensation.          Tracheal stenosis - Stable. Continue O2. Start heliox PRN worsening dyspnea. Awaiting bronchoscopy. Airway monitoring.        CARDIOVASCULAR:  Orthostatic hypotension from hypovolemia - Improved after IVF.  Hemodynamically stable - Not on pressors. Continue hemodynamic monitoring.  Telemetry (medical test) - Reviewed by me today independently. Normal sinus rhythm.  HTN - stable. Continue home antihypertensives medications.       RENAL:  Stable - Monitor IOs and electrolytes. Keep K above 4.0 and Mg above 2.0.       GASTROINTESTINAL:  GI prophylaxis not indicated  Zofran and Reglan IV PRN for nausea  Dysphagia diet       HEMATOLOGIC:  No signs of active bleeding. Monitor Hgb in CBC in AM  DVT prophylaxis with heparin subQ and SCDs.       INFECTIOUS DISEASE:  Mild fever of unclear source - Intermittently spiking fevers. Blood cultures obtained. Continue to monitor and follow up cultures. Prior febrile events with cultures have been negative. Possible malignancy related or central-like fever given glioblastoma.       ENDOCRINE:  DM2 – Stable. Monitor glucose fingersticks for goal 120-180. Insulin sliding scale. Carb control diet.       ONCOLOGY:  Grade 4 glioblastoma s/p craniotomy - Stable.        Pertinent clinical, laboratory, radiographic, hemodynamic, echocardiographic, respiratory data, microbiologic data and chart were reviewed by myself and analyzed frequently throughout the course of the day and night by myself.    Plan discussed at length with the CTICU staff and Attending CT Surgeon -   Dr Radha Garland.       Patient's status was discussed with patient at bedside.     	      ________________________________________________    _________________________  VITAL SIGNS:  Vital Signs Last 24 Hrs  T(C): 37.2 (17 Mar 2022 14:10), Max: 38.2 (17 Mar 2022 00:00)  T(F): 98.9 (17 Mar 2022 14:10), Max: 100.8 (17 Mar 2022 00:00)  HR: 79 (17 Mar 2022 18:00) (79 - 111)  BP: 103/71 (17 Mar 2022 18:00) (95/52 - 159/70)  BP(mean): 78 (17 Mar 2022 18:00) (61 - 109)  RR: 14 (17 Mar 2022 18:00) (13 - 32)  SpO2: 100% (17 Mar 2022 18:00) (83% - 100%)  I/Os:   I&O's Detail    16 Mar 2022 07:01  -  17 Mar 2022 07:00  --------------------------------------------------------  IN:    Albumin 5%  - 250 mL: 1000 mL    IV PiggyBack: 150 mL    Sodium Chloride 0.9% Bolus: 500 mL  Total IN: 1650 mL    OUT:    Voided (mL): 900 mL  Total OUT: 900 mL    Total NET: 750 mL      17 Mar 2022 07:01  -  17 Mar 2022 19:02  --------------------------------------------------------  IN:    Dexmedetomidine: 6 mL  Total IN: 6 mL    OUT:    Voided (mL): 1000 mL  Total OUT: 1000 mL    Total NET: -994 mL          Mode: standby      MEDICATIONS:  MEDICATIONS  (STANDING):  acetaminophen   IVPB .. 950 milliGRAM(s) IV Intermittent once  chlorhexidine 0.12% Liquid 15 milliLiter(s) Oral Mucosa every 12 hours  dexMEDEtomidine Infusion 0.4 MICROgram(s)/kG/Hr (6.46 mL/Hr) IV Continuous <Continuous>  dextrose 50% Injectable 12.5 Gram(s) IV Push once  escitalopram 5 milliGRAM(s) Oral daily  fluticasone propionate 50 MICROgram(s)/spray Nasal Spray 1 Spray(s) Both Nostrils two times a day  gabapentin 100 milliGRAM(s) Oral at bedtime  heparin   Injectable 5000 Unit(s) SubCutaneous every 8 hours  insulin glargine Injectable (LANTUS) 6 Unit(s) SubCutaneous at bedtime  insulin lispro (ADMELOG) corrective regimen sliding scale   SubCutaneous three times a day before meals  lacosamide IVPB 100 milliGRAM(s) IV Intermittent every 12 hours  levalbuterol Inhalation 0.63 milliGRAM(s) Inhalation every 6 hours  pantoprazole  Injectable 40 milliGRAM(s) IV Push daily  sodium chloride 1 Gram(s) Oral two times a day    MEDICATIONS  (PRN):  tetracaine/benzocaine/butamben Spray 1 Spray(s) Topical every 3 hours PRN sore throat      LABS:  Laboratory data was independently reviewed by me today.                           10.1   6.17  )-----------( 158      ( 17 Mar 2022 05:10 )             31.8     03-17    133<L>  |  95<L>  |  4<L>  ----------------------------<  190<H>  3.9   |  26  |  0.60    Ca    9.4      17 Mar 2022 05:10  Phos  3.3     03-17  Mg     1.80     03-17        PT/INR - ( 17 Mar 2022 05:10 )   PT: 14.7 sec;   INR: 1.26 ratio         PTT - ( 17 Mar 2022 05:10 )  PTT:30.2 sec        RADIOLOGY:   Radiology images were independently reviewed by me today. Reports were reviewed by me today.    Xray Chest 1 View- PORTABLE-Urgent:   ACC: [id number] EXAM:  XR CHEST PORTABLE ROUTINE 1V                        ACC: [id number] EXAM:  XR CHEST PORTABLE URGENT 1V                          PROCEDURE DATE:  03/17/2022          INTERPRETATION:  TIME OF EXAM: March 17, 2022 at 5:14 AM and 2:58 PM    CLINICAL INFORMATION: Tracheal stenosis    TECHNIQUE:   Portable chest    INTERPRETATION:    5:14 AM:  Linear atelectasis at the right lung base. Lungs are otherwise clear, the   heart is not enlarged and there is no effusion or pneumothorax.    2:58 PM:  A tracheostomy tube is been placed. No pneumomediastinum or pneumothorax.   Lungs remain clear although right apex is hazy probably overlying bones.   Moderate gastric distention from the tracheostomy procedure.      COMPARISON:  March 16      IMPRESSION:  Follow-up studies pre and post successful tracheostomy tube   placement.    --- End of Report ---            JOANNE BENNETT MD; Attending Radiologist  This document has been electronically signed. Mar 17 2022  5:57PM (03-17-22 @ 15:09)  Xray Chest 1 View- PORTABLE-Routine:   ACC: [id number] EXAM:  XR CHEST PORTABLE ROUTINE 1V                        ACC: [id number] EXAM:  XR CHEST PORTABLE URGENT 1V                          PROCEDURE DATE:  03/17/2022          INTERPRETATION:  TIME OF EXAM: March 17, 2022 at 5:14 AM and 2:58 PM    CLINICAL INFORMATION: Tracheal stenosis    TECHNIQUE:   Portable chest    INTERPRETATION:    5:14 AM:  Linear atelectasis at the right lung base. Lungs are otherwise clear, the   heart is not enlarged and there is no effusion or pneumothorax.    2:58 PM:  A tracheostomy tube is been placed. No pneumomediastinum or pneumothorax.   Lungs remain clear although right apex is hazy probably overlying bones.   Moderate gastric distention from the tracheostomy procedure.      COMPARISON:  March 16      IMPRESSION:  Follow-up studies pre and post successful tracheostomy tube   placement.    --- End of Report ---            JOANNE BENNETT MD; Attending Radiologist  This document has been electronically signed. Mar 17 2022  5:57PM (03-17-22 @ 06:41)  Xray Chest 1 View- PORTABLE-Routine:   ACC: [id number] EXAM:  XR CHEST PORTABLE ROUTINE 1V                          PROCEDURE DATE:  03/16/2022          INTERPRETATION:  TIME OF EXAM: March 16, 2022 at 6:03 AM    CLINICAL INFORMATION: Postop    TECHNIQUE:   AP chest    INTERPRETATION:    Linear opacity at the right lung base likely postop atelectasis.   Remainder of both lungs are clear, the heart is not enlarged and there is   no effusion or pneumothorax.      COMPARISON:  March 14      IMPRESSION:  Follow-up postop with postop linear atelectasis at the right   lung base otherwise clear lungs.    --- End of Report ---            JOANNE BENNETT MD; Attending Radiologist  This document has been electronically signed. Mar 16 2022 10:17AM (03-16-22 @ 07:09)

## 2022-03-17 ENCOUNTER — APPOINTMENT (OUTPATIENT)
Dept: THORACIC SURGERY | Facility: HOSPITAL | Age: 67
End: 2022-03-17

## 2022-03-17 LAB
ANION GAP SERPL CALC-SCNC: 12 MMOL/L — SIGNIFICANT CHANGE UP (ref 7–14)
APTT BLD: 30.2 SEC — SIGNIFICANT CHANGE UP (ref 27–36.3)
BUN SERPL-MCNC: 4 MG/DL — LOW (ref 7–23)
CALCIUM SERPL-MCNC: 9.4 MG/DL — SIGNIFICANT CHANGE UP (ref 8.4–10.5)
CHLORIDE SERPL-SCNC: 95 MMOL/L — LOW (ref 98–107)
CO2 SERPL-SCNC: 26 MMOL/L — SIGNIFICANT CHANGE UP (ref 22–31)
CREAT SERPL-MCNC: 0.6 MG/DL — SIGNIFICANT CHANGE UP (ref 0.5–1.3)
EGFR: 106 ML/MIN/1.73M2 — SIGNIFICANT CHANGE UP
GLUCOSE BLDC GLUCOMTR-MCNC: 167 MG/DL — HIGH (ref 70–99)
GLUCOSE BLDC GLUCOMTR-MCNC: 174 MG/DL — HIGH (ref 70–99)
GLUCOSE BLDC GLUCOMTR-MCNC: 228 MG/DL — HIGH (ref 70–99)
GLUCOSE BLDC GLUCOMTR-MCNC: 245 MG/DL — HIGH (ref 70–99)
GLUCOSE SERPL-MCNC: 190 MG/DL — HIGH (ref 70–99)
HCT VFR BLD CALC: 31.8 % — LOW (ref 39–50)
HGB BLD-MCNC: 10.1 G/DL — LOW (ref 13–17)
INR BLD: 1.26 RATIO — HIGH (ref 0.88–1.16)
MAGNESIUM SERPL-MCNC: 1.8 MG/DL — SIGNIFICANT CHANGE UP (ref 1.6–2.6)
MCHC RBC-ENTMCNC: 29.8 PG — SIGNIFICANT CHANGE UP (ref 27–34)
MCHC RBC-ENTMCNC: 31.8 GM/DL — LOW (ref 32–36)
MCV RBC AUTO: 93.8 FL — SIGNIFICANT CHANGE UP (ref 80–100)
NRBC # BLD: 0 /100 WBCS — SIGNIFICANT CHANGE UP
NRBC # FLD: 0 K/UL — SIGNIFICANT CHANGE UP
PHOSPHATE SERPL-MCNC: 3.3 MG/DL — SIGNIFICANT CHANGE UP (ref 2.5–4.5)
PLATELET # BLD AUTO: 158 K/UL — SIGNIFICANT CHANGE UP (ref 150–400)
POTASSIUM SERPL-MCNC: 3.9 MMOL/L — SIGNIFICANT CHANGE UP (ref 3.5–5.3)
POTASSIUM SERPL-SCNC: 3.9 MMOL/L — SIGNIFICANT CHANGE UP (ref 3.5–5.3)
PROTHROM AB SERPL-ACNC: 14.7 SEC — HIGH (ref 10.5–13.4)
RBC # BLD: 3.39 M/UL — LOW (ref 4.2–5.8)
RBC # FLD: 13 % — SIGNIFICANT CHANGE UP (ref 10.3–14.5)
SODIUM SERPL-SCNC: 133 MMOL/L — LOW (ref 135–145)
WBC # BLD: 6.17 K/UL — SIGNIFICANT CHANGE UP (ref 3.8–10.5)
WBC # FLD AUTO: 6.17 K/UL — SIGNIFICANT CHANGE UP (ref 3.8–10.5)

## 2022-03-17 PROCEDURE — 31645 BRNCHSC W/THER ASPIR 1ST: CPT | Mod: 59

## 2022-03-17 PROCEDURE — 31615 TRCHEOBRNCHSC EST TRACHS INC: CPT

## 2022-03-17 PROCEDURE — 71045 X-RAY EXAM CHEST 1 VIEW: CPT | Mod: 26

## 2022-03-17 PROCEDURE — 31624 DX BRONCHOSCOPE/LAVAGE: CPT | Mod: 59

## 2022-03-17 PROCEDURE — 31600 PLANNED TRACHEOSTOMY: CPT

## 2022-03-17 PROCEDURE — 99233 SBSQ HOSP IP/OBS HIGH 50: CPT

## 2022-03-17 DEVICE — TRACH DIC CUF 7.0MM: Type: IMPLANTABLE DEVICE | Status: FUNCTIONAL

## 2022-03-17 RX ORDER — FENTANYL CITRATE 50 UG/ML
100 INJECTION INTRAVENOUS ONCE
Refills: 0 | Status: DISCONTINUED | OUTPATIENT
Start: 2022-03-17 | End: 2022-03-17

## 2022-03-17 RX ORDER — SODIUM CHLORIDE 9 MG/ML
1000 INJECTION, SOLUTION INTRAVENOUS
Refills: 0 | Status: DISCONTINUED | OUTPATIENT
Start: 2022-03-17 | End: 2022-03-19

## 2022-03-17 RX ORDER — ACETAMINOPHEN 500 MG
650 TABLET ORAL EVERY 6 HOURS
Refills: 0 | Status: DISCONTINUED | OUTPATIENT
Start: 2022-03-17 | End: 2022-03-17

## 2022-03-17 RX ORDER — POTASSIUM CHLORIDE 20 MEQ
10 PACKET (EA) ORAL ONCE
Refills: 0 | Status: COMPLETED | OUTPATIENT
Start: 2022-03-17 | End: 2022-03-17

## 2022-03-17 RX ORDER — METOCLOPRAMIDE HCL 10 MG
10 TABLET ORAL ONCE
Refills: 0 | Status: COMPLETED | OUTPATIENT
Start: 2022-03-17 | End: 2022-03-17

## 2022-03-17 RX ORDER — INSULIN GLARGINE 100 [IU]/ML
6 INJECTION, SOLUTION SUBCUTANEOUS AT BEDTIME
Refills: 0 | Status: DISCONTINUED | OUTPATIENT
Start: 2022-03-17 | End: 2022-03-20

## 2022-03-17 RX ORDER — DEXMEDETOMIDINE HYDROCHLORIDE IN 0.9% SODIUM CHLORIDE 4 UG/ML
0.4 INJECTION INTRAVENOUS
Qty: 400 | Refills: 0 | Status: DISCONTINUED | OUTPATIENT
Start: 2022-03-17 | End: 2022-03-18

## 2022-03-17 RX ORDER — LACOSAMIDE 50 MG/1
100 TABLET ORAL EVERY 12 HOURS
Refills: 0 | Status: DISCONTINUED | OUTPATIENT
Start: 2022-03-17 | End: 2022-03-18

## 2022-03-17 RX ORDER — MAGNESIUM SULFATE 500 MG/ML
2 VIAL (ML) INJECTION ONCE
Refills: 0 | Status: COMPLETED | OUTPATIENT
Start: 2022-03-17 | End: 2022-03-17

## 2022-03-17 RX ORDER — CHLORHEXIDINE GLUCONATE 213 G/1000ML
15 SOLUTION TOPICAL EVERY 12 HOURS
Refills: 0 | Status: DISCONTINUED | OUTPATIENT
Start: 2022-03-17 | End: 2022-03-18

## 2022-03-17 RX ORDER — ACETAMINOPHEN 500 MG
950 TABLET ORAL ONCE
Refills: 0 | Status: DISCONTINUED | OUTPATIENT
Start: 2022-03-17 | End: 2022-03-22

## 2022-03-17 RX ORDER — CHLORHEXIDINE GLUCONATE 213 G/1000ML
15 SOLUTION TOPICAL EVERY 12 HOURS
Refills: 0 | Status: DISCONTINUED | OUTPATIENT
Start: 2022-03-17 | End: 2022-03-17

## 2022-03-17 RX ADMIN — Medication 650 MILLIGRAM(S): at 01:35

## 2022-03-17 RX ADMIN — Medication 1 SPRAY(S): at 17:40

## 2022-03-17 RX ADMIN — Medication 25 GRAM(S): at 06:27

## 2022-03-17 RX ADMIN — FENTANYL CITRATE 100 MICROGRAM(S): 50 INJECTION INTRAVENOUS at 14:45

## 2022-03-17 RX ADMIN — LACOSAMIDE 120 MILLIGRAM(S): 50 TABLET ORAL at 17:59

## 2022-03-17 RX ADMIN — Medication 650 MILLIGRAM(S): at 00:21

## 2022-03-17 RX ADMIN — LEVALBUTEROL 0.63 MILLIGRAM(S): 1.25 SOLUTION, CONCENTRATE RESPIRATORY (INHALATION) at 21:34

## 2022-03-17 RX ADMIN — LACOSAMIDE 100 MILLIGRAM(S): 50 TABLET ORAL at 05:54

## 2022-03-17 RX ADMIN — LEVALBUTEROL 0.63 MILLIGRAM(S): 1.25 SOLUTION, CONCENTRATE RESPIRATORY (INHALATION) at 16:10

## 2022-03-17 RX ADMIN — HEPARIN SODIUM 5000 UNIT(S): 5000 INJECTION INTRAVENOUS; SUBCUTANEOUS at 05:54

## 2022-03-17 RX ADMIN — Medication 1: at 06:00

## 2022-03-17 RX ADMIN — LEVALBUTEROL 0.63 MILLIGRAM(S): 1.25 SOLUTION, CONCENTRATE RESPIRATORY (INHALATION) at 09:50

## 2022-03-17 RX ADMIN — FENTANYL CITRATE 100 MICROGRAM(S): 50 INJECTION INTRAVENOUS at 14:35

## 2022-03-17 RX ADMIN — Medication 100 MILLIEQUIVALENT(S): at 09:20

## 2022-03-17 RX ADMIN — SODIUM CHLORIDE 75 MILLILITER(S): 9 INJECTION, SOLUTION INTRAVENOUS at 22:27

## 2022-03-17 RX ADMIN — LEVALBUTEROL 0.63 MILLIGRAM(S): 1.25 SOLUTION, CONCENTRATE RESPIRATORY (INHALATION) at 04:06

## 2022-03-17 RX ADMIN — DEXMEDETOMIDINE HYDROCHLORIDE IN 0.9% SODIUM CHLORIDE 6.46 MICROGRAM(S)/KG/HR: 4 INJECTION INTRAVENOUS at 15:09

## 2022-03-17 RX ADMIN — SODIUM CHLORIDE 1 GRAM(S): 9 INJECTION INTRAMUSCULAR; INTRAVENOUS; SUBCUTANEOUS at 05:54

## 2022-03-17 RX ADMIN — CHLORHEXIDINE GLUCONATE 15 MILLILITER(S): 213 SOLUTION TOPICAL at 17:39

## 2022-03-17 RX ADMIN — Medication 2: at 17:38

## 2022-03-17 RX ADMIN — HEPARIN SODIUM 5000 UNIT(S): 5000 INJECTION INTRAVENOUS; SUBCUTANEOUS at 22:24

## 2022-03-17 RX ADMIN — INSULIN GLARGINE 6 UNIT(S): 100 INJECTION, SOLUTION SUBCUTANEOUS at 22:22

## 2022-03-17 RX ADMIN — Medication 1 SPRAY(S): at 05:54

## 2022-03-17 RX ADMIN — Medication 10 MILLIGRAM(S): at 15:33

## 2022-03-17 NOTE — BRIEF OPERATIVE NOTE - NSICDXBRIEFPROCEDURE_GEN_ALL_CORE_FT
PROCEDURES:  Flexible bronchoscopy 11-Mar-2022 14:25:08  Graham Maradiaga  
PROCEDURES:  Flexible bronchoscopy 11-Mar-2022 14:25:08  Graham Maradiaga  Creation, tracheostomy, with bronchoscopy 17-Mar-2022 14:18:39  Graham Maradiaga

## 2022-03-17 NOTE — BRIEF OPERATIVE NOTE - OPERATION/FINDINGS
Flexible bronchoscopy. Significant subglottic stenosis visualized with concern for anterior cricoid cartilage weakness.
Flexible bronchoscopy. Tracheostomy creation with Portex 7 Cuffed.

## 2022-03-17 NOTE — PROGRESS NOTE ADULT - SUBJECTIVE AND OBJECTIVE BOX
CHIEF COMPLAINT: FOLLOW UP IN ICU FOR POSTOPERATIVE CARE OF PATIENT WHO IS S/P  Bronchoscopy for stridor            PROCEDURES:         Flexible bronchoscopy. Significant subglottic stenosis visualized with concern for anterior cricoid cartilage weakness. 11-March-2022         ISSUES:       Stridor   Tracheal stenosis   Anterior left cricoid cartilage necrosis   DM   Glioblastoma diagnosed Jan 2022, s/p craniotomy         INTERVAL EVENTS:      Tmax 100.8, no leucocytosis, sinus tachy to low 100's, MAP >65 off pressors.  Mild stridor, breathing comfortably without distress, Spo2 maintained 96% on 2 to 3lNC  NPO for tracheostomy today        HISTORY:      No complaints, mild headache. Denies nausea, says breathing is okay and not laboured like before.         PHYSICAL EXAM:      Gen: Comfortable, No acute distress     Eyes: Sclera white, Conjunctiva normal, Eyelids normal, Pupils symmetrical      ENT: Mucous membranes moist,  minimal audible inspiratory stridor    Neck: Trachea midline,  ,  ,  ,  ,  ,       CV: Rate regular, Rhythm regular,  ,  ,       Resp: Breath sounds clear, No accessory muscles use, ,       Abd: Soft, Non-distended, Non-tender, Bowel sounds normal,  ,  ,       Skin: Warm, No peripheral edema of lower extremities,  ,       : No gonzalez     Neuro: Moving all 4 extremities,       Psych: A&Ox3               ASSESSMENT AND PLAN:           NEURO:     Post-operative Pain - Tylenol IV PRN.             RESPIRATORY:   Hypoxia - Humidified O2. Wean nasal cannula for goal O2sat above 92. Obtain CXR. Incentive spirometry. Chest PT and frequent suctioning. Continue bronchodilators. OOB to chair & ambulate w/ assistance. Continuous pulse oximetry for support & to prevent decompensation.     Tracheostomy today    Keep HOB elevated            CARDIOVASCULAR:     Hemodynamically stable - Not on pressors. Continue hemodynamic monitoring.     Telemetry (medical test) - Reviewed by me today independently. Normal sinus rhythm.             RENAL:     Stable - Monitor IOs and electrolytes. Keep K above 4.0 and Mg above 2.0.             GASTROINTESTINAL:     GI prophylaxis not indicated     Zofran and Reglan IV PRN for nausea     Soft diet, glucerna            HEMATOLOGIC:     No signs of active bleeding. Monitor Hgb in CBC in AM     DVT prophylaxis with heparin subQ and SCDs.             INFECTIOUS DISEASE:     All surgical sites appear clean. No signs of active infection. Will monitor for fever and leukocytosis.           ENDOCRINE:     Stable – Monitor glucose fingersticks for goal 120-180. Lantus dose 12 units , lispro with meals          Pertinent clinical, laboratory, radiographic, hemodynamic, echocardiographic, respiratory data, microbiologic data and chart were reviewed by myself and analyzed frequently throughout the course of the day and night by myself.     Plan discussed at length with the CTICU staff and Attending CT Surgeon -   Dr Radha Garza    Patient's status was discussed with patient at bedside.     _________________________  VITAL SIGNS:  Vital Signs Last 24 Hrs  T(C): 37 (17 Mar 2022 12:00), Max: 38.2 (17 Mar 2022 00:00)  T(F): 98.6 (17 Mar 2022 12:00), Max: 100.8 (17 Mar 2022 00:00)  HR: 98 (17 Mar 2022 12:00) (81 - 111)  BP: 108/62 (17 Mar 2022 12:00) (95/52 - 135/72)  BP(mean): 74 (17 Mar 2022 12:00) (61 - 91)  RR: 17 (17 Mar 2022 12:00) (13 - 21)  SpO2: 98% (17 Mar 2022 12:00) (92% - 100%)  I/Os:   I&O's Detail    16 Mar 2022 07:01  -  17 Mar 2022 07:00  --------------------------------------------------------  IN:    Albumin 5%  - 250 mL: 1000 mL    IV PiggyBack: 150 mL    Sodium Chloride 0.9% Bolus: 500 mL  Total IN: 1650 mL    OUT:    Voided (mL): 900 mL  Total OUT: 900 mL    Total NET: 750 mL      17 Mar 2022 07:01  -  17 Mar 2022 13:12  --------------------------------------------------------  IN:  Total IN: 0 mL    OUT:    Voided (mL): 450 mL  Total OUT: 450 mL    Total NET: -450 mL              MEDICATIONS:  MEDICATIONS  (STANDING):  acetaminophen   IVPB .. 950 milliGRAM(s) IV Intermittent once  dextrose 50% Injectable 12.5 Gram(s) IV Push once  escitalopram 5 milliGRAM(s) Oral daily  fluticasone propionate 50 MICROgram(s)/spray Nasal Spray 1 Spray(s) Both Nostrils two times a day  gabapentin 100 milliGRAM(s) Oral at bedtime  heparin   Injectable 5000 Unit(s) SubCutaneous every 8 hours  insulin glargine Injectable (LANTUS) 12 Unit(s) SubCutaneous at bedtime  insulin lispro (ADMELOG) corrective regimen sliding scale   SubCutaneous three times a day before meals  insulin lispro Injectable (ADMELOG) 2 Unit(s) SubCutaneous before breakfast  insulin lispro Injectable (ADMELOG) 2 Unit(s) SubCutaneous before lunch  insulin lispro Injectable (ADMELOG) 2 Unit(s) SubCutaneous before dinner  lacosamide 100 milliGRAM(s) Oral two times a day  levalbuterol Inhalation 0.63 milliGRAM(s) Inhalation every 6 hours  melatonin 3 milliGRAM(s) Oral at bedtime  pantoprazole  Injectable 40 milliGRAM(s) IV Push daily  sodium chloride 1 Gram(s) Oral two times a day    MEDICATIONS  (PRN):  acetaminophen     Tablet .. 650 milliGRAM(s) Oral every 6 hours PRN Temp greater or equal to 38C (100.4F), Mild Pain (1 - 3)  benzocaine 15 mG/menthol 3.6 mG Lozenge 1 Lozenge Oral three times a day PRN Sore Throat  tetracaine/benzocaine/butamben Spray 1 Spray(s) Topical every 3 hours PRN sore throat      LABS:  Laboratory data was independently reviewed by me today.                           10.1   6.17  )-----------( 158      ( 17 Mar 2022 05:10 )             31.8     03-17    133<L>  |  95<L>  |  4<L>  ----------------------------<  190<H>  3.9   |  26  |  0.60    Ca    9.4      17 Mar 2022 05:10  Phos  3.3     03-17  Mg     1.80     03-17        PT/INR - ( 17 Mar 2022 05:10 )   PT: 14.7 sec;   INR: 1.26 ratio         PTT - ( 17 Mar 2022 05:10 )  PTT:30.2 sec        RADIOLOGY:   Radiology images were independently reviewed by me today. Reports were reviewed by me today.    Xray Chest 1 View- PORTABLE-Routine:   ACC: [id number] EXAM:  XR CHEST PORTABLE ROUTINE 1V                          PROCEDURE DATE:  03/16/2022          INTERPRETATION:  TIME OF EXAM: March 16, 2022 at 6:03 AM    CLINICAL INFORMATION: Postop    TECHNIQUE:   AP chest    INTERPRETATION:    Linear opacity at the right lung base likely postop atelectasis.   Remainder of both lungs are clear, the heart is not enlarged and there is   no effusion or pneumothorax.      COMPARISON:  March 14      IMPRESSION:  Follow-up postop with postop linear atelectasis at the right   lung base otherwise clear lungs.    --- End of Report ---            JOANNE BENNETT MD; Attending Radiologist  This document has been electronically signed. Mar 16 2022 10:17AM (03-16-22 @ 07:09)  Xray Chest 1 View- PORTABLE-Routine:   ACC: [id number] EXAM:  XR CHEST PORTABLE ROUTINE 1V                          PROCEDURE DATE:  03/14/2022          INTERPRETATION:  TIME OF EXAM: March 14, 2022 at 5:36 AM    CLINICAL INFORMATION: Post bronchoscopy    TECHNIQUE:   Portable chest    INTERPRETATION:    No extra alveolar air as a complication of bronchoscopy.    The lungs are clear, the heart is not enlarged and there are no effusions.      COMPARISON:  March 13      IMPRESSION:  Status post bronchoscopy with clear lungs and no   complications from bronchoscopy.    --- End of Report ---            JOANNE BENNETT MD; Attending Radiologist  This document has been electronically signed. Mar 14 2022  7:34AM (03-14-22 @ 06:31)  Xray Chest 1 View- PORTABLE-Routine:   ACC: [id number] EXAM:  XR CHEST PORTABLE ROUTINE 1V                          PROCEDURE DATE:  03/13/2022          INTERPRETATION:  TIME OF EXAM: March 13, 2022 at 5:32 AM.    CLINICAL INFORMATION: Status post bronchoscopy. Follow-up.    COMPARISON:  March 12, 2022.    TECHNIQUE:   AP Portable chest x-ray. Lordotic and rotated.    INTERPRETATION:    Heart size and the mediastinum cannot be accurately evaluated on this   projection.  The lungs are clear. Previous patchy left lower lung opacities have   resolved.  No pleural effusion or pneumothorax seen.  No acute bony abnormality noted.      IMPRESSION:  With resolution of previous left lower lung patchy opacities.    No pneumothorax.    --- End of Report ---            YOLIE LAMAR MD; Attending Radiologist  This document has been electronically signed. Mar 13 2022 11:57AM (03-13-22 @ 08:27)

## 2022-03-17 NOTE — BRIEF OPERATIVE NOTE - NSICDXBRIEFPREOP_GEN_ALL_CORE_FT
PRE-OP DIAGNOSIS:  Stridor 11-Mar-2022 14:24:57  Graham Maradiaga  
PRE-OP DIAGNOSIS:  Stridor 11-Mar-2022 14:24:57  Graham Maradiaga

## 2022-03-18 LAB
ANION GAP SERPL CALC-SCNC: 11 MMOL/L — SIGNIFICANT CHANGE UP (ref 7–14)
BUN SERPL-MCNC: 7 MG/DL — SIGNIFICANT CHANGE UP (ref 7–23)
CALCIUM SERPL-MCNC: 9.1 MG/DL — SIGNIFICANT CHANGE UP (ref 8.4–10.5)
CHLORIDE SERPL-SCNC: 93 MMOL/L — LOW (ref 98–107)
CO2 SERPL-SCNC: 27 MMOL/L — SIGNIFICANT CHANGE UP (ref 22–31)
CREAT SERPL-MCNC: 0.51 MG/DL — SIGNIFICANT CHANGE UP (ref 0.5–1.3)
EGFR: 112 ML/MIN/1.73M2 — SIGNIFICANT CHANGE UP
GLUCOSE BLDC GLUCOMTR-MCNC: 129 MG/DL — HIGH (ref 70–99)
GLUCOSE BLDC GLUCOMTR-MCNC: 201 MG/DL — HIGH (ref 70–99)
GLUCOSE BLDC GLUCOMTR-MCNC: 207 MG/DL — HIGH (ref 70–99)
GLUCOSE BLDC GLUCOMTR-MCNC: 220 MG/DL — HIGH (ref 70–99)
GLUCOSE SERPL-MCNC: 241 MG/DL — HIGH (ref 70–99)
HCT VFR BLD CALC: 30.5 % — LOW (ref 39–50)
HGB BLD-MCNC: 9.7 G/DL — LOW (ref 13–17)
MAGNESIUM SERPL-MCNC: 1.8 MG/DL — SIGNIFICANT CHANGE UP (ref 1.6–2.6)
MCHC RBC-ENTMCNC: 29.8 PG — SIGNIFICANT CHANGE UP (ref 27–34)
MCHC RBC-ENTMCNC: 31.8 GM/DL — LOW (ref 32–36)
MCV RBC AUTO: 93.6 FL — SIGNIFICANT CHANGE UP (ref 80–100)
NRBC # BLD: 0 /100 WBCS — SIGNIFICANT CHANGE UP
NRBC # FLD: 0 K/UL — SIGNIFICANT CHANGE UP
PHOSPHATE SERPL-MCNC: 3.5 MG/DL — SIGNIFICANT CHANGE UP (ref 2.5–4.5)
PLATELET # BLD AUTO: 147 K/UL — LOW (ref 150–400)
POTASSIUM SERPL-MCNC: 4.9 MMOL/L — SIGNIFICANT CHANGE UP (ref 3.5–5.3)
POTASSIUM SERPL-SCNC: 4.9 MMOL/L — SIGNIFICANT CHANGE UP (ref 3.5–5.3)
RBC # BLD: 3.26 M/UL — LOW (ref 4.2–5.8)
RBC # FLD: 12.5 % — SIGNIFICANT CHANGE UP (ref 10.3–14.5)
SODIUM SERPL-SCNC: 131 MMOL/L — LOW (ref 135–145)
WBC # BLD: 4.62 K/UL — SIGNIFICANT CHANGE UP (ref 3.8–10.5)
WBC # FLD AUTO: 4.62 K/UL — SIGNIFICANT CHANGE UP (ref 3.8–10.5)

## 2022-03-18 PROCEDURE — 71045 X-RAY EXAM CHEST 1 VIEW: CPT | Mod: 26

## 2022-03-18 PROCEDURE — 99233 SBSQ HOSP IP/OBS HIGH 50: CPT

## 2022-03-18 PROCEDURE — 74230 X-RAY XM SWLNG FUNCJ C+: CPT | Mod: 26

## 2022-03-18 RX ORDER — ATORVASTATIN CALCIUM 80 MG/1
20 TABLET, FILM COATED ORAL AT BEDTIME
Refills: 0 | Status: DISCONTINUED | OUTPATIENT
Start: 2022-03-18 | End: 2022-03-22

## 2022-03-18 RX ORDER — LACOSAMIDE 50 MG/1
100 TABLET ORAL
Refills: 0 | Status: DISCONTINUED | OUTPATIENT
Start: 2022-03-18 | End: 2022-03-22

## 2022-03-18 RX ORDER — PANTOPRAZOLE SODIUM 20 MG/1
40 TABLET, DELAYED RELEASE ORAL
Refills: 0 | Status: DISCONTINUED | OUTPATIENT
Start: 2022-03-18 | End: 2022-03-22

## 2022-03-18 RX ORDER — MAGNESIUM SULFATE 500 MG/ML
2 VIAL (ML) INJECTION ONCE
Refills: 0 | Status: COMPLETED | OUTPATIENT
Start: 2022-03-18 | End: 2022-03-18

## 2022-03-18 RX ORDER — LANOLIN ALCOHOL/MO/W.PET/CERES
3 CREAM (GRAM) TOPICAL AT BEDTIME
Refills: 0 | Status: DISCONTINUED | OUTPATIENT
Start: 2022-03-18 | End: 2022-03-22

## 2022-03-18 RX ADMIN — GABAPENTIN 100 MILLIGRAM(S): 400 CAPSULE ORAL at 21:03

## 2022-03-18 RX ADMIN — HEPARIN SODIUM 5000 UNIT(S): 5000 INJECTION INTRAVENOUS; SUBCUTANEOUS at 06:16

## 2022-03-18 RX ADMIN — Medication 1 SPRAY(S): at 06:15

## 2022-03-18 RX ADMIN — SODIUM CHLORIDE 75 MILLILITER(S): 9 INJECTION, SOLUTION INTRAVENOUS at 21:03

## 2022-03-18 RX ADMIN — Medication 2: at 18:19

## 2022-03-18 RX ADMIN — INSULIN GLARGINE 6 UNIT(S): 100 INJECTION, SOLUTION SUBCUTANEOUS at 21:06

## 2022-03-18 RX ADMIN — CHLORHEXIDINE GLUCONATE 15 MILLILITER(S): 213 SOLUTION TOPICAL at 06:05

## 2022-03-18 RX ADMIN — HEPARIN SODIUM 5000 UNIT(S): 5000 INJECTION INTRAVENOUS; SUBCUTANEOUS at 21:03

## 2022-03-18 RX ADMIN — LACOSAMIDE 120 MILLIGRAM(S): 50 TABLET ORAL at 06:07

## 2022-03-18 RX ADMIN — SODIUM CHLORIDE 1 GRAM(S): 9 INJECTION INTRAMUSCULAR; INTRAVENOUS; SUBCUTANEOUS at 18:20

## 2022-03-18 RX ADMIN — Medication 2: at 06:22

## 2022-03-18 RX ADMIN — PANTOPRAZOLE SODIUM 40 MILLIGRAM(S): 20 TABLET, DELAYED RELEASE ORAL at 13:07

## 2022-03-18 RX ADMIN — Medication 2: at 13:10

## 2022-03-18 RX ADMIN — LACOSAMIDE 100 MILLIGRAM(S): 50 TABLET ORAL at 18:20

## 2022-03-18 RX ADMIN — Medication 25 GRAM(S): at 06:13

## 2022-03-18 RX ADMIN — ATORVASTATIN CALCIUM 20 MILLIGRAM(S): 80 TABLET, FILM COATED ORAL at 21:04

## 2022-03-18 RX ADMIN — LEVALBUTEROL 0.63 MILLIGRAM(S): 1.25 SOLUTION, CONCENTRATE RESPIRATORY (INHALATION) at 21:42

## 2022-03-18 RX ADMIN — LEVALBUTEROL 0.63 MILLIGRAM(S): 1.25 SOLUTION, CONCENTRATE RESPIRATORY (INHALATION) at 10:09

## 2022-03-18 RX ADMIN — PANTOPRAZOLE SODIUM 40 MILLIGRAM(S): 20 TABLET, DELAYED RELEASE ORAL at 15:22

## 2022-03-18 RX ADMIN — LEVALBUTEROL 0.63 MILLIGRAM(S): 1.25 SOLUTION, CONCENTRATE RESPIRATORY (INHALATION) at 16:24

## 2022-03-18 RX ADMIN — Medication 1 SPRAY(S): at 18:20

## 2022-03-18 RX ADMIN — HEPARIN SODIUM 5000 UNIT(S): 5000 INJECTION INTRAVENOUS; SUBCUTANEOUS at 15:22

## 2022-03-18 RX ADMIN — LEVALBUTEROL 0.63 MILLIGRAM(S): 1.25 SOLUTION, CONCENTRATE RESPIRATORY (INHALATION) at 04:16

## 2022-03-18 NOTE — SWALLOW VFSS/MBS ASSESSMENT ADULT - COMMENTS
Per Critical Care Attending Note 3/15/22: "68 y/o M with PMHx of recently dx Grade 4 glioblastoma s/p craniotomy, HTN, T2DM, and recent hospitalization at [facility] for tracheal stenosis p/w severe throat pain, difficulty breathing, and productive cough. Pt discharged on 3/3 after tracheal balloon dilation and biopsy. 4 days ago (5 days post-discharge), pt started having severe 10/10 throat pain, worst under the chin but extending down to the rest of the throat, and having difficulty breathing. Throat pain is worsened with swallowing. Also endorses cough productive of yellow sputum, which has been keeping pt up at night."     Per Critical Care Attending Note 3/17/22: "FOLLOW UP IN ICU FOR POSTOPERATIVE CARE OF PATIENT WHO IS S/P Bronchoscopy for stridor; Significant subglottic stenosis visualized with concern for anterior cricoid cartilage weakness. 	   -->ISSUES:  Stridor, Tracheal stenosis, Anterior left cricoid cartilage necrosis, DM, Glioblastoma diagnosed Jan 2022, s/p craniotomy, Tracheostomy today."     Patient is familiar to this department as the patient was seen for an multiple swallow evaluations (3/2, 3/3), with the most recent evaluation with recommendation of regular solids with thin liquids. Refer to full reports for further details. Per Critical Care Attending Note 3/15/22: "66 y/o M with PMHx of recently dx Grade 4 glioblastoma s/p craniotomy, HTN, T2DM, and recent hospitalization at [facility] for tracheal stenosis p/w severe throat pain, difficulty breathing, and productive cough. Pt discharged on 3/3 after tracheal balloon dilation and biopsy. 4 days ago (5 days post-discharge), pt started having severe 10/10 throat pain, worst under the chin but extending down to the rest of the throat, and having difficulty breathing. Throat pain is worsened with swallowing. Also endorses cough productive of yellow sputum, which has been keeping pt up at night."     Per Critical Care Attending Note 3/17/22: "FOLLOW UP IN ICU FOR POSTOPERATIVE CARE OF PATIENT WHO IS S/P Bronchoscopy for stridor; Significant subglottic stenosis visualized with concern for anterior cricoid cartilage weakness. 	   -->ISSUES:  Stridor, Tracheal stenosis, Anterior left cricoid cartilage necrosis, DM, Glioblastoma diagnosed Jan 2022, s/p craniotomy, Tracheostomy today."     Patient is familiar to this department as the patient was seen for an multiple swallow evaluations (3/2, 3/3), with the most recent evaluation with recommendation of regular solids with thin liquids. Patient seen this AM for a Green Dye Swallow Test as patient is now s/p Tracheostomy with recommendations of consideration for NPO with consideration of short-term non-oral means of nutrition. Per discussion with CTI MD, cinesophagram to be completed today. Refer to full reports for further details.    Patient received in Radiology Suite awake, alert and communicative and transferred to specialized seating unit for lateral view projection. #7 Cuffed Tracheostomy noted on trach collar status with one-way speaking valve in place. Patient with adequate spontaneous phonation. Per Critical Care Attending Note 3/15/22: "66 y/o M with PMHx of recently dx Grade 4 glioblastoma s/p craniotomy, HTN, T2DM, and recent hospitalization at [facility] for tracheal stenosis p/w severe throat pain, difficulty breathing, and productive cough. Pt discharged on 3/3 after tracheal balloon dilation and biopsy. 4 days ago (5 days post-discharge), pt started having severe 10/10 throat pain, worst under the chin but extending down to the rest of the throat, and having difficulty breathing. Throat pain is worsened with swallowing. Also endorses cough productive of yellow sputum, which has been keeping pt up at night."     Per Critical Care Attending Note 3/17/22: "FOLLOW UP IN ICU FOR POSTOPERATIVE CARE OF PATIENT WHO IS S/P Bronchoscopy for stridor; Significant subglottic stenosis visualized with concern for anterior cricoid cartilage weakness. 	   -->ISSUES:  Stridor, Tracheal stenosis, Anterior left cricoid cartilage necrosis, DM, Glioblastoma diagnosed Jan 2022, s/p craniotomy, Tracheostomy today."     Patient is familiar to this department as the patient was seen for an multiple swallow evaluations (3/2, 3/3), with the most recent evaluation with recommendation of regular solids with thin liquids. Patient seen this AM for a Green Dye Swallow Test as patient is now s/p Tracheostomy with recommendations of consideration for NPO with consideration of short-term non-oral means of nutrition. Per discussion with CTI MD, cinesophagram to be completed today. Refer to full reports for further details.    Patient received in Radiology Suite awake, alert and communicative and transferred to specialized seating unit for lateral view projection. #7 Cuffed Tracheostomy noted on trach collar status with one-way speaking valve in place. Patient with adequate spontaneous voicing

## 2022-03-18 NOTE — SWALLOW VFSS/MBS ASSESSMENT ADULT - ORAL PHASE COMMENTS
Adequate bolus containment, adequate bolus manipulation and adequate anterior-posterior transport. There was adequate oral clearance noted post primary swallow. Adequate bolus containment, adequate bolus manipulation, adequate anterior-posterior transport and adequate oral clearance post primary swallow. Adequate bolus containment, adequate bolus manipulation and adequate anterior-posterior transport. Trace/mild lingual residue noted post primary swallow though cleared with spontaneous re-swallow. Adequate bolus containment, adequate bolus manipulation and adequate anterior-posterior transport. There was trace/mild residue at the lingual surface post primary swallow though adequate clearance with use of spontaneous re-swallow. Adequate bolus containment, adequate bolus manipulation, prolonged mastication with adequate ability to break down solid textures and adequate anterior-posterior transport. There was mild oral residue post primary swallow covering lingual and palatal surface, though adequate oral clearance with use of spontaneous re-swallow.

## 2022-03-18 NOTE — SWALLOW BEDSIDE ASSESSMENT ADULT - ASR SWALLOW RECOMMEND DIAG
Cinesophagram is recommended to objectively assess swallow mechanism as patient is with tracheostomy which can impede hyolaryngeal elevation and increase risk of aspiration./VFSS/MBS

## 2022-03-18 NOTE — SWALLOW VFSS/MBS ASSESSMENT ADULT - DIAGNOSTIC IMPRESSIONS
1. Mild oral stage deficits given thin liquids, mildly thick liquids, moderately thick liquids, puree and regular solids marked by adequate bolus containment, adequate bolus manipulation, prolonged mastication with adequate ability to break down solid textures and adequate anterior-posterior transport. Patient noted with trace/mild lingual and palatal surface post primary swallow though adequate oral clearance with use of spontaneous re-swallow.   2. Mild pharyngeal stage deficits marked by reduced tongue base retraction resulting in trace residue at the base of tongue and valleculae post primary swallow, delayed initiation of the pharyngeal swallow trigger (bolus head at the valleculae-thin liquids), adequate laryngeal elevation, reduced pharyngeal constriction resulting in trace residue at the pyriform sinuses post primary swallow and adequate laryngeal vestibule closure. There were trace pharyngeal clearance deficits located diffuse through the hypopharynx, though adequate clearance with use of spontaneous re-swallow. There was one episode of Laryngeal Penetration during the swallow given thin liquids with retrieval and adequate airway protection maintained. There was No Aspiration before, during or after the swallow given thin liquids, mildly thick liquids, moderately thick liquids, puree and regular solids.     Of note: One-way speaking valve removed at end of cinesophagram and patient provided cup sip of thin liquids, no aspiration/penetration observed before, during or after the swallow.

## 2022-03-18 NOTE — PROGRESS NOTE ADULT - SUBJECTIVE AND OBJECTIVE BOX
ANESTHESIA POSTOP CHECK    67y Male POSTOP DAY 1     No COMPLAINTS    NO APPARENT ANESTHESIA COMPLICATIONS

## 2022-03-18 NOTE — PROGRESS NOTE ADULT - SUBJECTIVE AND OBJECTIVE BOX
CHIEF COMPLAINT: FOLLOW UP IN ICU FOR CARE OF PATIENT WHO HAS TRACHEAL STENOSIS      PROCEDURES:   -  Tracheostomy creation with Portex 7 Cuffed. 17-Mar-2022   - Flexible bronchoscopy. Significant subglottic stenosis visualized with concern for anterior cricoid cartilage weakness. 11-March-2022   - Craniotomy, with supratentorial neoplasm excision 27-Jan-2022    ISSUES:   Tracheal stenosis  Anterior left cricoid cartilage necrosis  Grade 4 Glioblastoma of Brain s/p Craniotomy (1/2022)  DM2    INTERVAL EVENTS:   Placed on speaking valve which he is tolerating  Modified barium swallow cleared for dysphagia diet.  Arranging for home trach care.     HISTORY:   Patient denies dyspnea and stridor. Denies chest pain, cough, pleuritic pain, fever. Denies abdominal pain, nausea, or vomiting.    PHYSICAL EXAM:   Gen: Comfortable, No acute distress  Eyes: Sclera white, Conjunctiva normal, Eyelids normal, Pupils symmetrical   ENT: Mucous membranes moist,  ,  ,  Trach  Neck: Trachea midline,  ,  ,  ,  ,  ,    CV: Rate regular, Rhythm regular,  ,  ,    Resp: Breath sounds clear, No accessory muscles use,  ,  ,    Abd: Soft, Non-distended, Non-tender, Bowel sounds normal,  ,  ,    Skin: Warm, No peripheral edema of lower extremities,  ,    : No gonzalez  Neuro: Moving all 4 extremities,    Psych: A&Ox3      ASSESSMENT AND PLAN:     NEURO:  No pain issues.        Seizure disorder - stable. continue antiepileptics       RESPIRATORY:  Hypoxia - Wean nasal cannula for goal O2sat above 92. Obtain CXR. Incentive spirometry. Chest PT and frequent suctioning. Continue bronchodilators. OOB to chair & ambulate w/ assistance. Continuous pulse oximetry for support & to prevent decompensation.        Tracheal stenosis s/p tracheostomy. - Speaking valve. Trach care.         CARDIOVASCULAR:  Hemodynamically stable - Not on pressors. Continue hemodynamic monitoring.  Telemetry (medical test) - Reviewed by me today independently. Normal sinus rhythm.  HTN - stable. Continue home antihypertensives medications.       RENAL:  Stable - Monitor IOs and electrolytes. Keep K above 4.0 and Mg above 2.0.       GASTROINTESTINAL:  GI prophylaxis not indicated  Zofran and Reglan IV PRN for nausea  Dysphagia diet       HEMATOLOGIC:  No signs of active bleeding. Monitor Hgb in CBC in AM  DVT prophylaxis with heparin subQ and SCDs.       INFECTIOUS DISEASE:  Mild fever of unclear source - Intermittently spiking fevers. Blood cultures obtained. Continue to monitor and follow up cultures. Prior febrile events with cultures have been negative. Possible malignancy related or central-like fever given glioblastoma.       ENDOCRINE:  DM2 – Stable. Monitor glucose fingersticks for goal 120-180. Insulin sliding scale. Carb control diet.       ONCOLOGY:  Grade 4 glioblastoma s/p craniotomy - Stable.        Pertinent clinical, laboratory, radiographic, hemodynamic, echocardiographic, respiratory data, microbiologic data and chart were reviewed by myself and analyzed frequently throughout the course of the day and night by myself.    Plan discussed at length with the CTICU staff and Attending CT Surgeon -   Dr Radha Garland.       Patient's status was discussed with patient at bedside.         _________________________  VITAL SIGNS:  Vital Signs Last 24 Hrs  T(C): 36.6 (18 Mar 2022 13:00), Max: 36.6 (18 Mar 2022 00:00)  T(F): 97.8 (18 Mar 2022 13:00), Max: 97.9 (18 Mar 2022 00:00)  HR: 100 (18 Mar 2022 13:00) (62 - 103)  BP: 119/75 (18 Mar 2022 13:00) (95/67 - 141/76)  BP(mean): 85 (18 Mar 2022 13:00) (69 - 102)  RR: 22 (18 Mar 2022 13:00) (11 - 32)  SpO2: 94% (18 Mar 2022 13:00) (83% - 100%)  I/Os:   I&O's Detail    17 Mar 2022 07:01  -  18 Mar 2022 07:00  --------------------------------------------------------  IN:    Dexmedetomidine: 6 mL    IV PiggyBack: 100 mL    Lactated Ringers: 750 mL  Total IN: 856 mL    OUT:    Voided (mL): 2000 mL  Total OUT: 2000 mL    Total NET: -1144 mL      18 Mar 2022 07:01  -  18 Mar 2022 14:34  --------------------------------------------------------  IN:    Lactated Ringers: 525 mL  Total IN: 525 mL    OUT:    Voided (mL): 600 mL  Total OUT: 600 mL    Total NET: -75 mL          Mode: standby      MEDICATIONS:  MEDICATIONS  (STANDING):  acetaminophen   IVPB .. 950 milliGRAM(s) IV Intermittent once  atorvastatin 20 milliGRAM(s) Oral at bedtime  dextrose 50% Injectable 12.5 Gram(s) IV Push once  escitalopram 5 milliGRAM(s) Oral daily  fluticasone propionate 50 MICROgram(s)/spray Nasal Spray 1 Spray(s) Both Nostrils two times a day  gabapentin 100 milliGRAM(s) Oral at bedtime  heparin   Injectable 5000 Unit(s) SubCutaneous every 8 hours  insulin glargine Injectable (LANTUS) 6 Unit(s) SubCutaneous at bedtime  insulin lispro (ADMELOG) corrective regimen sliding scale   SubCutaneous three times a day before meals  lacosamide 100 milliGRAM(s) Oral two times a day  lactated ringers. 1000 milliLiter(s) (75 mL/Hr) IV Continuous <Continuous>  levalbuterol Inhalation 0.63 milliGRAM(s) Inhalation every 6 hours  pantoprazole    Tablet 40 milliGRAM(s) Oral before breakfast  sodium chloride 1 Gram(s) Oral two times a day    MEDICATIONS  (PRN):  tetracaine/benzocaine/butamben Spray 1 Spray(s) Topical every 3 hours PRN sore throat      LABS:  Laboratory data was independently reviewed by me today.                           9.7    4.62  )-----------( 147      ( 18 Mar 2022 03:53 )             30.5     03-18    131<L>  |  93<L>  |  7   ----------------------------<  241<H>  4.9   |  27  |  0.51    Ca    9.1      18 Mar 2022 03:53  Phos  3.5     03-18  Mg     1.80     03-18        PT/INR - ( 17 Mar 2022 05:10 )   PT: 14.7 sec;   INR: 1.26 ratio         PTT - ( 17 Mar 2022 05:10 )  PTT:30.2 sec        RADIOLOGY:   Radiology images were independently reviewed by me today. Reports were reviewed by me today.    Xray Chest 1 View- PORTABLE-Urgent:   ACC: [id number] EXAM:  XR CHEST PORTABLE URGENT 1V                          PROCEDURE DATE:  03/18/2022          INTERPRETATION:  TIME OF EXAM: March 18, 2022 at 11:08 AM    CLINICAL INFORMATION: Post tracheostomy.    TECHNIQUE:   Portable chest    INTERPRETATION:    Tracheostomy tube remains in place. Lungs are better expanded than the   study of the day before showing no signs of any acute pulmonary disease.    The heart is not enlarged and there is no effusion or pneumothorax.      COMPARISON: March 17      IMPRESSION:  Status post tracheostomy with clear lungs.    --- End of Report ---            JOANNE BENNETT MD; Attending Radiologist  This document has been electronically signed. Mar 18 2022 11:22AM (03-18-22 @ 11:35)  Xray Chest 1 View- PORTABLE-Urgent:   ACC: [id number] EXAM:  XR CHEST PORTABLE ROUTINE 1V                        ACC: [id number] EXAM:  XR CHEST PORTABLE URGENT 1V                          PROCEDURE DATE:  03/17/2022          INTERPRETATION:  TIME OF EXAM: March 17, 2022 at 5:14 AM and 2:58 PM    CLINICAL INFORMATION: Tracheal stenosis    TECHNIQUE:   Portable chest    INTERPRETATION:    5:14 AM:  Linear atelectasis at the right lung base. Lungs are otherwise clear, the   heart is not enlarged and there is no effusion or pneumothorax.    2:58 PM:  A tracheostomy tube is been placed. No pneumomediastinum or pneumothorax.   Lungs remain clear although right apex is hazy probably overlying bones.   Moderate gastric distention from the tracheostomy procedure.      COMPARISON:  March 16      IMPRESSION:  Follow-up studies pre and post successful tracheostomy tube   placement.    --- End of Report ---            JOANNE BENNETT MD; Attending Radiologist  This document has been electronically signed. Mar 17 2022  5:57PM (03-17-22 @ 15:09)  Xray Chest 1 View- PORTABLE-Routine:   ACC: [id number] EXAM:  XR CHEST PORTABLE ROUTINE 1V                        ACC: [id number] EXAM:  XR CHEST PORTABLE URGENT 1V                          PROCEDURE DATE:  03/17/2022          INTERPRETATION:  TIME OF EXAM: March 17, 2022 at 5:14 AM and 2:58 PM    CLINICAL INFORMATION: Tracheal stenosis    TECHNIQUE:   Portable chest    INTERPRETATION:    5:14 AM:  Linear atelectasis at the right lung base. Lungs are otherwise clear, the   heart is not enlarged and there is no effusion or pneumothorax.    2:58 PM:  A tracheostomy tube is been placed. No pneumomediastinum or pneumothorax.   Lungs remain clear although right apex is hazy probably overlying bones.   Moderate gastric distention from the tracheostomy procedure.      COMPARISON:  March 16      IMPRESSION:  Follow-up studies pre and post successful tracheostomy tube   placement.    --- End of Report ---            JOANNE BENNETT MD; Attending Radiologist  This document has been electronically signed. Mar 17 2022  5:57PM (03-17-22 @ 06:41)       CHIEF COMPLAINT: FOLLOW UP IN ICU FOR CARE OF PATIENT WHO HAS TRACHEAL STENOSIS      PROCEDURES:   -  Tracheostomy creation with Portex 7 Cuffed. 17-Mar-2022   - Flexible bronchoscopy. Significant subglottic stenosis visualized with concern for anterior cricoid cartilage weakness. 11-March-2022   - Craniotomy, with supratentorial neoplasm excision 27-Jan-2022    ISSUES:   Tracheal stenosis  Anterior left cricoid cartilage necrosis  Grade 4 Glioblastoma of Brain s/p Craniotomy (1/2022)  DM2  Chronic hyponatremia    INTERVAL EVENTS:   Placed on speaking valve which he is tolerating  Modified barium swallow cleared for dysphagia diet.  Arranging for home trach care.     HISTORY:   Patient denies dyspnea and stridor. Denies chest pain, cough, pleuritic pain, fever. Denies abdominal pain, nausea, or vomiting.    PHYSICAL EXAM:   Gen: Comfortable, No acute distress  Eyes: Sclera white, Conjunctiva normal, Eyelids normal, Pupils symmetrical   ENT: Mucous membranes moist,  ,  ,  Trach  Neck: Trachea midline,  ,  ,  ,  ,  ,    CV: Rate regular, Rhythm regular,  ,  ,    Resp: Breath sounds clear, No accessory muscles use,  ,  ,    Abd: Soft, Non-distended, Non-tender, Bowel sounds normal,  ,  ,    Skin: Warm, No peripheral edema of lower extremities,  ,    : No gonzalez  Neuro: Moving all 4 extremities,    Psych: A&Ox3      ASSESSMENT AND PLAN:     NEURO:  No pain issues.        Seizure disorder - stable. continue antiepileptics       RESPIRATORY:  Hypoxia - Wean nasal cannula for goal O2sat above 92. Obtain CXR. Incentive spirometry. Chest PT and frequent suctioning. Continue bronchodilators. OOB to chair & ambulate w/ assistance. Continuous pulse oximetry for support & to prevent decompensation.        Tracheal stenosis s/p tracheostomy. - Speaking valve. Trach care.         CARDIOVASCULAR:  Hemodynamically stable - Not on pressors. Continue hemodynamic monitoring.  Telemetry (medical test) - Reviewed by me today independently. Normal sinus rhythm.  HTN - stable. Continue home antihypertensives medications.       RENAL:  Stable - Monitor IOs and electrolytes. Keep K above 4.0 and Mg above 2.0.     Chronic hyponatremia - Stable. Likely central related. continue sodium chloride PO. Monitor Na level. Avoid overcorrection greater than 8 in 24 hours.     GASTROINTESTINAL:  GI prophylaxis not indicated  Zofran and Reglan IV PRN for nausea  Dysphagia diet       HEMATOLOGIC:  No signs of active bleeding. Monitor Hgb in CBC in AM  DVT prophylaxis with heparin subQ and SCDs.       INFECTIOUS DISEASE:  Mild fever of unclear source - Intermittently spiking fevers. Blood cultures obtained. Continue to monitor and follow up cultures. Prior febrile events with cultures have been negative. Possible malignancy related or central-like fever given glioblastoma.       ENDOCRINE:  DM2 – Stable. Monitor glucose fingersticks for goal 120-180. Insulin sliding scale. Carb control diet.       ONCOLOGY:  Grade 4 glioblastoma s/p craniotomy - Stable.        Pertinent clinical, laboratory, radiographic, hemodynamic, echocardiographic, respiratory data, microbiologic data and chart were reviewed by myself and analyzed frequently throughout the course of the day and night by myself.    Plan discussed at length with the CTICU staff and Attending CT Surgeon -   Dr Radha Garland.       Patient's status was discussed with patient at bedside.         _________________________  VITAL SIGNS:  Vital Signs Last 24 Hrs  T(C): 36.6 (18 Mar 2022 13:00), Max: 36.6 (18 Mar 2022 00:00)  T(F): 97.8 (18 Mar 2022 13:00), Max: 97.9 (18 Mar 2022 00:00)  HR: 100 (18 Mar 2022 13:00) (62 - 103)  BP: 119/75 (18 Mar 2022 13:00) (95/67 - 141/76)  BP(mean): 85 (18 Mar 2022 13:00) (69 - 102)  RR: 22 (18 Mar 2022 13:00) (11 - 32)  SpO2: 94% (18 Mar 2022 13:00) (83% - 100%)  I/Os:   I&O's Detail    17 Mar 2022 07:01  -  18 Mar 2022 07:00  --------------------------------------------------------  IN:    Dexmedetomidine: 6 mL    IV PiggyBack: 100 mL    Lactated Ringers: 750 mL  Total IN: 856 mL    OUT:    Voided (mL): 2000 mL  Total OUT: 2000 mL    Total NET: -1144 mL      18 Mar 2022 07:01  -  18 Mar 2022 14:34  --------------------------------------------------------  IN:    Lactated Ringers: 525 mL  Total IN: 525 mL    OUT:    Voided (mL): 600 mL  Total OUT: 600 mL    Total NET: -75 mL          Mode: standby      MEDICATIONS:  MEDICATIONS  (STANDING):  acetaminophen   IVPB .. 950 milliGRAM(s) IV Intermittent once  atorvastatin 20 milliGRAM(s) Oral at bedtime  dextrose 50% Injectable 12.5 Gram(s) IV Push once  escitalopram 5 milliGRAM(s) Oral daily  fluticasone propionate 50 MICROgram(s)/spray Nasal Spray 1 Spray(s) Both Nostrils two times a day  gabapentin 100 milliGRAM(s) Oral at bedtime  heparin   Injectable 5000 Unit(s) SubCutaneous every 8 hours  insulin glargine Injectable (LANTUS) 6 Unit(s) SubCutaneous at bedtime  insulin lispro (ADMELOG) corrective regimen sliding scale   SubCutaneous three times a day before meals  lacosamide 100 milliGRAM(s) Oral two times a day  lactated ringers. 1000 milliLiter(s) (75 mL/Hr) IV Continuous <Continuous>  levalbuterol Inhalation 0.63 milliGRAM(s) Inhalation every 6 hours  pantoprazole    Tablet 40 milliGRAM(s) Oral before breakfast  sodium chloride 1 Gram(s) Oral two times a day    MEDICATIONS  (PRN):  tetracaine/benzocaine/butamben Spray 1 Spray(s) Topical every 3 hours PRN sore throat      LABS:  Laboratory data was independently reviewed by me today.                           9.7    4.62  )-----------( 147      ( 18 Mar 2022 03:53 )             30.5     03-18    131<L>  |  93<L>  |  7   ----------------------------<  241<H>  4.9   |  27  |  0.51    Ca    9.1      18 Mar 2022 03:53  Phos  3.5     03-18  Mg     1.80     03-18        PT/INR - ( 17 Mar 2022 05:10 )   PT: 14.7 sec;   INR: 1.26 ratio         PTT - ( 17 Mar 2022 05:10 )  PTT:30.2 sec        RADIOLOGY:   Radiology images were independently reviewed by me today. Reports were reviewed by me today.    Xray Chest 1 View- PORTABLE-Urgent:   ACC: [id number] EXAM:  XR CHEST PORTABLE URGENT 1V                          PROCEDURE DATE:  03/18/2022          INTERPRETATION:  TIME OF EXAM: March 18, 2022 at 11:08 AM    CLINICAL INFORMATION: Post tracheostomy.    TECHNIQUE:   Portable chest    INTERPRETATION:    Tracheostomy tube remains in place. Lungs are better expanded than the   study of the day before showing no signs of any acute pulmonary disease.    The heart is not enlarged and there is no effusion or pneumothorax.      COMPARISON: March 17      IMPRESSION:  Status post tracheostomy with clear lungs.    --- End of Report ---            JOANNE BENNETT MD; Attending Radiologist  This document has been electronically signed. Mar 18 2022 11:22AM (03-18-22 @ 11:35)  Xray Chest 1 View- PORTABLE-Urgent:   ACC: [id number] EXAM:  XR CHEST PORTABLE ROUTINE 1V                        ACC: [id number] EXAM:  XR CHEST PORTABLE URGENT 1V                          PROCEDURE DATE:  03/17/2022          INTERPRETATION:  TIME OF EXAM: March 17, 2022 at 5:14 AM and 2:58 PM    CLINICAL INFORMATION: Tracheal stenosis    TECHNIQUE:   Portable chest    INTERPRETATION:    5:14 AM:  Linear atelectasis at the right lung base. Lungs are otherwise clear, the   heart is not enlarged and there is no effusion or pneumothorax.    2:58 PM:  A tracheostomy tube is been placed. No pneumomediastinum or pneumothorax.   Lungs remain clear although right apex is hazy probably overlying bones.   Moderate gastric distention from the tracheostomy procedure.      COMPARISON:  March 16      IMPRESSION:  Follow-up studies pre and post successful tracheostomy tube   placement.    --- End of Report ---            JOANNE BENNETT MD; Attending Radiologist  This document has been electronically signed. Mar 17 2022  5:57PM (03-17-22 @ 15:09)  Xray Chest 1 View- PORTABLE-Routine:   ACC: [id number] EXAM:  XR CHEST PORTABLE ROUTINE 1V                        ACC: [id number] EXAM:  XR CHEST PORTABLE URGENT 1V                          PROCEDURE DATE:  03/17/2022          INTERPRETATION:  TIME OF EXAM: March 17, 2022 at 5:14 AM and 2:58 PM    CLINICAL INFORMATION: Tracheal stenosis    TECHNIQUE:   Portable chest    INTERPRETATION:    5:14 AM:  Linear atelectasis at the right lung base. Lungs are otherwise clear, the   heart is not enlarged and there is no effusion or pneumothorax.    2:58 PM:  A tracheostomy tube is been placed. No pneumomediastinum or pneumothorax.   Lungs remain clear although right apex is hazy probably overlying bones.   Moderate gastric distention from the tracheostomy procedure.      COMPARISON:  March 16      IMPRESSION:  Follow-up studies pre and post successful tracheostomy tube   placement.    --- End of Report ---            JOANNE BENNETT MD; Attending Radiologist  This document has been electronically signed. Mar 17 2022  5:57PM (03-17-22 @ 06:41)

## 2022-03-18 NOTE — SWALLOW BEDSIDE ASSESSMENT ADULT - SWALLOW EVAL: DIAGNOSIS
Green Dye Swallow Test. Patient provided puree and moderately thick liquids (4 oz of each) impregnated with green food color dye to assess for gross aspiration. Oral stage marked by adequate bolus containment, adequate bolus manipulation and adequate anterior-posterior transport with adequate oral clearance noted. Pharyngeal stage marked by observed initiation of the pharyngeal swallow trigger and hyolaryngal excursion judged via laryngeal palpation. Patient noted with multiple swallows given puree texture, possibly indicative of pharyngeal clearance deficits. In addition ,patient noted with intermittent coughing throughout PO trials, likely indicative of aspiration. RN provided tracheal suctioning immediately after provision of PO trials, with no Green Dye return noted. RN suctioned again one hour later,.. Green Dye Swallow Test. Patient provided puree and moderately thick liquids (4 oz of each) impregnated with green food color dye to assess for gross aspiration. Oral stage marked by adequate bolus containment, adequate bolus manipulation and adequate anterior-posterior transport with adequate oral clearance noted. Pharyngeal stage marked by observed initiation of the pharyngeal swallow trigger and hyolaryngal excursion judged via laryngeal palpation. Patient noted with multiple swallows given puree texture, possibly indicative of pharyngeal clearance deficits. In addition ,patient noted with intermittent coughing throughout PO trials, likely indicative of aspiration. RN provided tracheal suctioning immediately after provision of PO trials, with no Green Dye return noted. RN suctioned again one hour later again with no Green Dye return.

## 2022-03-18 NOTE — SWALLOW VFSS/MBS ASSESSMENT ADULT - PHARYNGEAL PHASE COMMENTS
Trace residue at the base of tongue, valleculae,  pyriform sinuses post primary swallow, adequate clearance with use of spontaneous re-swallow adequate pharyngeal clearance Adequate pharyngeal clearance

## 2022-03-18 NOTE — SWALLOW BEDSIDE ASSESSMENT ADULT - COMMENTS
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 Pt discharged on 3/3 after tracheal balloon dilation and biopsy. 4 days ago (5 days post-discharge), pt started having severe 10/10 throat pain, worst under the chin but extending down to the rest of the throat, and having difficulty breathing. Throat pain is worsened with swallowing. Also endorses cough productive of yellow sputum, which has been keeping pt up at night. Denies fever, chills, chest pain, abdominal pain, N/V. Has been tolerating PO intake.  Pt had on 3/2/22 a Bronch, balloon dilation and tracheal biopsy with Dr Garland. Post procedure pt's symptoms improved and pt was eventually discharged to home by medical service with plans for outpt follow up with DR Goldman. OR pathology showed no malignancy. Today pt presents to [facility] ER with worsening cough, SOB, and neck pain x 4 days. Pt seen with DR Garland. Pt has audible inspiratory stridor with some SOB during exam. Daughter and bedside who confirms symptoms. ASsessment done with Dr. Garland speaking to pt and pt's daughter in Romansh. Pt looks to be in mild resp distress. On exam coarse wheezing heard throughout. Pt to be admitted to Thoracic surgery, CTICU with plans for urgent add on Bronchoscopy today with DR Chavez or DR. Garland. "     Patient received upright in bed, awake and alert. #7 Cuffed Portex Trachestomy noted, cuff deflated, on trach collar status. Spo2 at 97% and did not change throughout the evaluation. Patient with weak vocal quality via digital occlusion of trachestomy hub from SLP. Patient noted to communicate with SLP via gestures and written communication as patient reported 3/10 pain when phonating. 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 Pt discharged on 3/3 after tracheal balloon dilation and biopsy. 4 days ago (5 days post-discharge), pt started having severe 10/10 throat pain, worst under the chin but extending down to the rest of the throat, and having difficulty breathing. Throat pain is worsened with swallowing. Also endorses cough productive of yellow sputum, which has been keeping pt up at night. Denies fever, chills, chest pain, abdominal pain, N/V. Has been tolerating PO intake.  Pt had on 3/2/22 a Bronch, balloon dilation and tracheal biopsy with Dr Garland. Post procedure pt's symptoms improved and pt was eventually discharged to home by medical service with plans for outpt follow up with DR Goldman. OR pathology showed no malignancy. Today pt presents to [facility] ER with worsening cough, SOB, and neck pain x 4 days. Pt seen with DR Garland. Pt has audible inspiratory stridor with some SOB during exam. Daughter and bedside who confirms symptoms. ASsessment done with Dr. Garland speaking to pt and pt's daughter in Maori. Pt looks to be in mild resp distress. On exam coarse wheezing heard throughout. Pt to be admitted to Thoracic surgery, CTICU with plans for urgent add on Bronchoscopy today with DR Chavez or DR. Garland. "     Patient received upright in bed, awake and alert. #7 Cuffed Portex Tracheostomy noted, cuff deflated, on trach collar status. Spo2 at 97% and did not change throughout the evaluation. Patient with weak vocal quality via digital occlusion of tracheostomy hub from SLP. Patient noted to communicate with SLP via gestures and written communication as patient reported 3/10 pain when phonating. 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 Pt discharged on 3/3 after tracheal balloon dilation and biopsy. 4 days ago (5 days post-discharge), pt started having severe 10/10 throat pain, worst under the chin but extending down to the rest of the throat, and having difficulty breathing. Throat pain is worsened with swallowing. Also endorses cough productive of yellow sputum, which has been keeping pt up at night."     Per Critical Care Attending Note 3/17/22: "FOLLOW UP IN ICU FOR POSTOPERATIVE CARE OF PATIENT WHO IS S/P Bronchoscopy for stridor; Significant subglottic stenosis visualized with concern for anterior cricoid cartilage weakness. 	   -->ISSUES:  Stridor, Tracheal stenosis, Anterior left cricoid cartilage necrosis, DM, Glioblastoma diagnosed Jan 2022, s/p craniotomy, Tracheostomy today."     Patient is familiar to this department as the patient was seen for an multiple swallow evaluations (3/2, 3/3), with the most recent evaluation with recommendation of regular solids with thin liquids. Refer to full reports for further details.     Patient received upright in bed, awake and alert. #7 Cuffed Portex Tracheostomy noted, cuff deflated, on trach collar status. Spo2 at 97% and did not change throughout the evaluation. Patient with weak vocal quality via digital occlusion of tracheostomy hub from SLP. Patient noted to communicate with SLP via gestures and written communication as patient reported 3/10 pain when phonating.

## 2022-03-18 NOTE — SWALLOW VFSS/MBS ASSESSMENT ADULT - RECOMMENDED CONSISTENCY
1. Soft & Bite Sized with Thin Liquids  2. Aspiration/Reflux Precautions  3. Patient must wear one-way speaking valve during meals and all PO intake.

## 2022-03-19 ENCOUNTER — TRANSCRIPTION ENCOUNTER (OUTPATIENT)
Age: 67
End: 2022-03-19

## 2022-03-19 LAB
ANION GAP SERPL CALC-SCNC: 12 MMOL/L — SIGNIFICANT CHANGE UP (ref 7–14)
BUN SERPL-MCNC: 7 MG/DL — SIGNIFICANT CHANGE UP (ref 7–23)
CALCIUM SERPL-MCNC: 8.9 MG/DL — SIGNIFICANT CHANGE UP (ref 8.4–10.5)
CHLORIDE SERPL-SCNC: 97 MMOL/L — LOW (ref 98–107)
CO2 SERPL-SCNC: 27 MMOL/L — SIGNIFICANT CHANGE UP (ref 22–31)
CREAT SERPL-MCNC: 0.63 MG/DL — SIGNIFICANT CHANGE UP (ref 0.5–1.3)
EGFR: 105 ML/MIN/1.73M2 — SIGNIFICANT CHANGE UP
GLUCOSE BLDC GLUCOMTR-MCNC: 117 MG/DL — HIGH (ref 70–99)
GLUCOSE BLDC GLUCOMTR-MCNC: 153 MG/DL — HIGH (ref 70–99)
GLUCOSE BLDC GLUCOMTR-MCNC: 165 MG/DL — HIGH (ref 70–99)
GLUCOSE BLDC GLUCOMTR-MCNC: 192 MG/DL — HIGH (ref 70–99)
GLUCOSE SERPL-MCNC: 114 MG/DL — HIGH (ref 70–99)
HCT VFR BLD CALC: 30 % — LOW (ref 39–50)
HGB BLD-MCNC: 9.6 G/DL — LOW (ref 13–17)
MAGNESIUM SERPL-MCNC: 1.8 MG/DL — SIGNIFICANT CHANGE UP (ref 1.6–2.6)
MCHC RBC-ENTMCNC: 29.7 PG — SIGNIFICANT CHANGE UP (ref 27–34)
MCHC RBC-ENTMCNC: 32 GM/DL — SIGNIFICANT CHANGE UP (ref 32–36)
MCV RBC AUTO: 92.9 FL — SIGNIFICANT CHANGE UP (ref 80–100)
NRBC # BLD: 0 /100 WBCS — SIGNIFICANT CHANGE UP
NRBC # FLD: 0 K/UL — SIGNIFICANT CHANGE UP
PHOSPHATE SERPL-MCNC: 3.6 MG/DL — SIGNIFICANT CHANGE UP (ref 2.5–4.5)
PLATELET # BLD AUTO: 210 K/UL — SIGNIFICANT CHANGE UP (ref 150–400)
POTASSIUM SERPL-MCNC: 3.9 MMOL/L — SIGNIFICANT CHANGE UP (ref 3.5–5.3)
POTASSIUM SERPL-SCNC: 3.9 MMOL/L — SIGNIFICANT CHANGE UP (ref 3.5–5.3)
RBC # BLD: 3.23 M/UL — LOW (ref 4.2–5.8)
RBC # FLD: 12.9 % — SIGNIFICANT CHANGE UP (ref 10.3–14.5)
SODIUM SERPL-SCNC: 136 MMOL/L — SIGNIFICANT CHANGE UP (ref 135–145)
WBC # BLD: 6.74 K/UL — SIGNIFICANT CHANGE UP (ref 3.8–10.5)
WBC # FLD AUTO: 6.74 K/UL — SIGNIFICANT CHANGE UP (ref 3.8–10.5)

## 2022-03-19 PROCEDURE — 99233 SBSQ HOSP IP/OBS HIGH 50: CPT

## 2022-03-19 PROCEDURE — 71045 X-RAY EXAM CHEST 1 VIEW: CPT | Mod: 26

## 2022-03-19 RX ORDER — DIPHENHYDRAMINE HCL 50 MG
12.5 CAPSULE ORAL EVERY 4 HOURS
Refills: 0 | Status: DISCONTINUED | OUTPATIENT
Start: 2022-03-19 | End: 2022-03-22

## 2022-03-19 RX ORDER — DIPHENHYDRAMINE HCL 50 MG
12.5 CAPSULE ORAL EVERY 4 HOURS
Refills: 0 | Status: DISCONTINUED | OUTPATIENT
Start: 2022-03-19 | End: 2022-03-19

## 2022-03-19 RX ORDER — POTASSIUM CHLORIDE 20 MEQ
20 PACKET (EA) ORAL ONCE
Refills: 0 | Status: COMPLETED | OUTPATIENT
Start: 2022-03-19 | End: 2022-03-19

## 2022-03-19 RX ORDER — MAGNESIUM SULFATE 500 MG/ML
1 VIAL (ML) INJECTION ONCE
Refills: 0 | Status: COMPLETED | OUTPATIENT
Start: 2022-03-19 | End: 2022-03-19

## 2022-03-19 RX ADMIN — Medication 1 SPRAY(S): at 17:09

## 2022-03-19 RX ADMIN — HEPARIN SODIUM 5000 UNIT(S): 5000 INJECTION INTRAVENOUS; SUBCUTANEOUS at 22:07

## 2022-03-19 RX ADMIN — Medication 1: at 17:08

## 2022-03-19 RX ADMIN — HEPARIN SODIUM 5000 UNIT(S): 5000 INJECTION INTRAVENOUS; SUBCUTANEOUS at 06:02

## 2022-03-19 RX ADMIN — HEPARIN SODIUM 5000 UNIT(S): 5000 INJECTION INTRAVENOUS; SUBCUTANEOUS at 14:59

## 2022-03-19 RX ADMIN — Medication 3 MILLIGRAM(S): at 00:11

## 2022-03-19 RX ADMIN — LEVALBUTEROL 0.63 MILLIGRAM(S): 1.25 SOLUTION, CONCENTRATE RESPIRATORY (INHALATION) at 16:13

## 2022-03-19 RX ADMIN — SODIUM CHLORIDE 1 GRAM(S): 9 INJECTION INTRAMUSCULAR; INTRAVENOUS; SUBCUTANEOUS at 06:03

## 2022-03-19 RX ADMIN — Medication 1 SPRAY(S): at 06:03

## 2022-03-19 RX ADMIN — LACOSAMIDE 100 MILLIGRAM(S): 50 TABLET ORAL at 17:08

## 2022-03-19 RX ADMIN — SODIUM CHLORIDE 1 GRAM(S): 9 INJECTION INTRAMUSCULAR; INTRAVENOUS; SUBCUTANEOUS at 17:08

## 2022-03-19 RX ADMIN — LEVALBUTEROL 0.63 MILLIGRAM(S): 1.25 SOLUTION, CONCENTRATE RESPIRATORY (INHALATION) at 22:10

## 2022-03-19 RX ADMIN — LACOSAMIDE 100 MILLIGRAM(S): 50 TABLET ORAL at 06:03

## 2022-03-19 RX ADMIN — Medication 12.5 MILLIGRAM(S): at 22:08

## 2022-03-19 RX ADMIN — INSULIN GLARGINE 6 UNIT(S): 100 INJECTION, SOLUTION SUBCUTANEOUS at 22:08

## 2022-03-19 RX ADMIN — ATORVASTATIN CALCIUM 20 MILLIGRAM(S): 80 TABLET, FILM COATED ORAL at 22:07

## 2022-03-19 RX ADMIN — Medication 100 GRAM(S): at 06:24

## 2022-03-19 RX ADMIN — Medication 1: at 12:30

## 2022-03-19 RX ADMIN — Medication 20 MILLIEQUIVALENT(S): at 06:42

## 2022-03-19 RX ADMIN — LEVALBUTEROL 0.63 MILLIGRAM(S): 1.25 SOLUTION, CONCENTRATE RESPIRATORY (INHALATION) at 03:43

## 2022-03-19 RX ADMIN — GABAPENTIN 100 MILLIGRAM(S): 400 CAPSULE ORAL at 22:07

## 2022-03-19 RX ADMIN — ESCITALOPRAM OXALATE 5 MILLIGRAM(S): 10 TABLET, FILM COATED ORAL at 12:30

## 2022-03-19 RX ADMIN — Medication 3 MILLIGRAM(S): at 22:08

## 2022-03-19 RX ADMIN — PANTOPRAZOLE SODIUM 40 MILLIGRAM(S): 20 TABLET, DELAYED RELEASE ORAL at 06:14

## 2022-03-19 RX ADMIN — LEVALBUTEROL 0.63 MILLIGRAM(S): 1.25 SOLUTION, CONCENTRATE RESPIRATORY (INHALATION) at 09:39

## 2022-03-19 NOTE — DISCHARGE NOTE PROVIDER - NSDCMRMEDTOKEN_GEN_ALL_CORE_FT
acetaminophen 325 mg oral tablet: 2 tab(s) orally every 6 hours, As needed, Mild Pain (1 - 3), Moderate Pain (4 - 6)  amoxicillin-clavulanate 875 mg-125 mg oral tablet: 1 tab(s) orally every 12 hours  atorvastatin 20 mg oral tablet: 1 tab(s) orally once a day (at bedtime)  escitalopram 5 mg oral tablet: 1 tab(s) orally once a day  fluticasone 50 mcg/inh nasal spray: 1 spray(s) nasal 2 times a day  gabapentin 100 mg oral capsule: 1 cap(s) orally once a day (at bedtime)  HumaLOG KwikPen 100 units/mL injectable solution: 5 unit(s) subcutaneous 3 times a day (with meals)   Insulin Pen Needles, 4mm: 1 application subcutaneously 4 times a day. ** Use with insulin pen **   Lantus Solostar Pen 100 units/mL subcutaneous solution: 16 unit(s) subcutaneous once a day (at bedtime)   melatonin 3 mg oral tablet: 1 tab(s) orally once a day (at bedtime)  metFORMIN 1000 mg oral tablet: 1 tab(s) orally 2 times a day  pantoprazole 40 mg oral delayed release tablet: 1 tab(s) orally once a day (before a meal)  polyethylene glycol 3350 oral powder for reconstitution: 17 gram(s) orally every 12 hours, As Needed for constipation (if no BM x 2 days).   senna oral tablet: 2 tab(s) orally once a day (at bedtime)  sodium chloride 1 g oral tablet: 1 tab(s) orally 2 times a day   Vimpat 100 mg oral tablet: 1 tab(s) orally 2 times a day   acetaminophen 325 mg oral tablet: 2 tab(s) orally every 6 hours, As needed, Mild Pain (1 - 3), Moderate Pain (4 - 6)  atorvastatin 20 mg oral tablet: 1 tab(s) orally once a day (at bedtime)  escitalopram 5 mg oral tablet: 1 tab(s) orally once a day  fluticasone 50 mcg/inh nasal spray: 1 spray(s) nasal 2 times a day  gabapentin 100 mg oral capsule: 1 cap(s) orally once a day (at bedtime)  HumaLOG KwikPen 100 units/mL injectable solution: 5 unit(s) subcutaneous 3 times a day (with meals)   Insulin Pen Needles, 4mm: 1 application subcutaneously 4 times a day. ** Use with insulin pen **   Lantus Solostar Pen 100 units/mL subcutaneous solution: 16 unit(s) subcutaneous once a day (at bedtime)   melatonin 3 mg oral tablet: 1 tab(s) orally once a day (at bedtime)  metFORMIN 1000 mg oral tablet: 1 tab(s) orally 2 times a day  pantoprazole 40 mg oral delayed release tablet: 1 tab(s) orally once a day (before a meal)  polyethylene glycol 3350 oral powder for reconstitution: 17 gram(s) orally every 12 hours, As Needed for constipation (if no BM x 2 days).   senna oral tablet: 2 tab(s) orally once a day (at bedtime)  sodium chloride 1 g oral tablet: 1 tab(s) orally 2 times a day   Vimpat 100 mg oral tablet: 1 tab(s) orally 2 times a day

## 2022-03-19 NOTE — DISCHARGE NOTE PROVIDER - HOSPITAL COURSE
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 Pt was placed on a speaking valve on 3/18, cleared for DC when all trach supplies delivered to home. 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 Pt was placed on a speaking valve on 3/18, cleared for DC when all trach supplies delivered to home, conformed with Social work and case management.

## 2022-03-19 NOTE — DISCHARGE NOTE PROVIDER - CARE PROVIDER_API CALL
Radha aGrland)  Surgery; Thoracic Surgery  [id number] [place] [address]  Phone: [phone]  Fax: [phone]  Follow Up Time:

## 2022-03-19 NOTE — PROGRESS NOTE ADULT - SUBJECTIVE AND OBJECTIVE BOX
JORDAN CHAKRABORTY      67y   Male   MRN-[id number]         No Known Allergies             Daily     Daily Drug Dosing Weight  Height (cm): 170.2 (11 Mar 2022 10:00)  Weight (kg): 64.6 (12 Mar 2022 20:00)  BMI (kg/m2): 22.3 (12 Mar 2022 20:00)  BSA (m2): 1.75 (12 Mar 2022 20:00)      68 y/o M with PMHx of recently dx Grade 4 glioblastoma s/p craniotomy, HTN, T2DM, and recent hospitalization at [facility] for tracheal stenosis p/w severe throat pain, difficulty breathing, and productive cough. Pt discharged on 3/3 after tracheal balloon dilation and biopsy. 4 days ago (5 days post-discharge), pt started having severe 10/10 throat pain, worst under the chin but extending down to the rest of the throat, and having difficulty breathing. Throat pain is worsened with swallowing. Also endorses cough productive of yellow sputum, which has been keeping pt up at night. Denies fever, chills, chest pain, abdominal pain, N/V. Has been tolerating PO intake.  Pt had on 3/2/22 a Bronch, balloon dilation and tracheal biopsy with Dr Garland. Post procedure pt's symptoms improved and pt was eventually discharged to home by medical service with plans for outpt follow up with DR Goldman. OR pathology showed no malignancy. Today pt presents to [facility] ER with worsening cough, SOB, and neck pain x 4 days. Pt seen with DR Garland. Pt has audible inspiratory stridor with some SOB during exam. Daughter and bedside who confirms symptoms. ASsessment done with Dr. Garland speaking to pt and pt's daughter in Lithuanian. Pt looks to be in mild resp distress. On exam coarse wheezing heard throughout. Pt to be admitted to Thoracic surgery, CTICU with plans for urgent add on Bronchoscopy today with DR Chavez or DR. Garland.  (11 Mar 2022 11:36)    Procedure: Flexible bronchoscopy. Significant subglottic stenosis visualized with concern for anterior cricoid cartilage weakness. 11-March-2022      Flexible bronchoscopy. Tracheostomy creation with Portex 7 Cuffed.                        Issues:               Tracheal stenosis                Stridor   Anterior left cricoid cartilage necrosis   DM   Glioblastoma  s/p craniotomy   Tracheostomy status                Home Medications:  acetaminophen 325 mg oral tablet: 2 tab(s) orally every 6 hours, As needed, Mild Pain (1 - 3), Moderate Pain (4 - 6) (03 Mar 2022 18:11)  melatonin 3 mg oral tablet: 1 tab(s) orally once a day (at bedtime) (01 Mar 2022 03:19)  polyethylene glycol 3350 oral powder for reconstitution: 17 gram(s) orally every 12 hours, As Needed for constipation (if no BM x 2 days).  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    Type 2 diabetes mellitus    S/P craniotomy      Vital Signs Last 24 Hrs  T(C): 36.5 (19 Mar 2022 08:00), Max: 36.9 (18 Mar 2022 20:00)  T(F): 97.7 (19 Mar 2022 08:00), Max: 98.4 (18 Mar 2022 20:00)  HR: 85 (19 Mar 2022 09:41) (72 - 100)  BP: 90/52 (19 Mar 2022 09:00) (90/52 - 132/74)  BP(mean): 62 (19 Mar 2022 09:00) (62 - 91)  RR: 22 (19 Mar 2022 09:41) (10 - 22)  SpO2: 100% (19 Mar 2022 09:41) (93% - 100%)  /I&O's Detail/    18 Mar 2022 07:01  -  19 Mar 2022 07:00  --------------------------------------------------------  IN:    IV PiggyBack: 100 mL    Lactated Ringers: 1575 mL    Oral Fluid: 120 mL  Total IN: 1795 mL    OUT:    Voided (mL): 1100 mL  Total OUT: 1100 mL    Total NET: 695 mL      19 Mar 2022 07:01  -  19 Mar 2022 11:52  --------------------------------------------------------  IN:    Oral Fluid: 240 mL  Total IN: 240 mL    OUT:  Total OUT: 0 mL    Total NET: 240 mL        CAPILLARY BLOOD GLUCOSE      POCT Blood Glucose.: 153 mg/dL (19 Mar 2022 11:46)  POCT Blood Glucose.: 117 mg/dL (19 Mar 2022 07:42)  POCT Blood Glucose.: 129 mg/dL (18 Mar 2022 21:06)  POCT Blood Glucose.: 201 mg/dL (18 Mar 2022 17:56)  POCT Blood Glucose.: 220 mg/dL (18 Mar 2022 13:06)    Home Medications:  acetaminophen 325 mg oral tablet: 2 tab(s) orally every 6 hours, As needed, Mild Pain (1 - 3), Moderate Pain (4 - 6) (03 Mar 2022 18:11)  melatonin 3 mg oral tablet: 1 tab(s) orally once a day (at bedtime) (01 Mar 2022 03:19)  polyethylene glycol 3350 oral powder for reconstitution: 17 gram(s) orally every 12 hours, As Needed for constipation (if no BM x 2 days).  (01 Mar 2022 03:19)  senna oral tablet: 2 tab(s) orally once a day (at bedtime) (01 Mar 2022 03:19)  Vimpat 100 mg oral tablet: 1 tab(s) orally 2 times a day (01 Mar 2022 03:19)    MEDICATIONS  (STANDING):  acetaminophen   IVPB .. 950 milliGRAM(s) IV Intermittent once  atorvastatin 20 milliGRAM(s) Oral at bedtime  dextrose 50% Injectable 12.5 Gram(s) IV Push once  escitalopram 5 milliGRAM(s) Oral daily  fluticasone propionate 50 MICROgram(s)/spray Nasal Spray 1 Spray(s) Both Nostrils two times a day  gabapentin 100 milliGRAM(s) Oral at bedtime  heparin   Injectable 5000 Unit(s) SubCutaneous every 8 hours  insulin glargine Injectable (LANTUS) 6 Unit(s) SubCutaneous at bedtime  insulin lispro (ADMELOG) corrective regimen sliding scale   SubCutaneous three times a day before meals  lacosamide 100 milliGRAM(s) Oral two times a day  levalbuterol Inhalation 0.63 milliGRAM(s) Inhalation every 6 hours  melatonin 3 milliGRAM(s) Oral at bedtime  pantoprazole    Tablet 40 milliGRAM(s) Oral before breakfast  sodium chloride 1 Gram(s) Oral two times a day    MEDICATIONS  (PRN):  tetracaine/benzocaine/butamben Spray 1 Spray(s) Topical every 3 hours PRN sore throat        Physical exam:   General:               Pt is awake, alert,  appears to be comfortable                                                  Neuro:                  Nonfocal                             Cardiovascular:   S1 & S2, regular                           Respiratory:         Air entry is fair and equal on both sides, lungs are clear                          GI:                          Soft, nondistended and nontender, Bowel sounds active                            Ext:                        No cyanosis or edema                              Labs:                                                                           9.6    6.74  )-----------( 210      ( 19 Mar 2022 04:58 )             30.0             03-19    136  |  97<L>  |  7   ----------------------------<  114<H>  3.9   |  27  |  0.63    Ca    8.9      19 Mar 2022 04:58  Phos  3.6     03-19  Mg     1.80     03-19                      Culture - Blood (collected 17 Mar 2022 10:12)  Source: .Blood Blood  Preliminary Report (18 Mar 2022 11:01):    No growth to date.        CXR:  < from: Xray Chest 1 View- PORTABLE-Routine (03.19.22 @ 06:40) >  Tracheostomy tube in place unchanged from the study of the day before.   Lungs show no focal abnormalities. The heart is not enlarged and there is   no effusion or pneumothorax.    Contrast material seen in the visualized hepatic flexure.    COMPARISON:  March 18    IMPRESSION:  Clear lungs with tracheostomy tube.          Plan:  General: 67yMale s/p Flexible bronchoscopy. Significant subglottic stenosis visualized with concern for anterior cricoid cartilage weakness. 11-March-2022, fairly comfortable on Heliox.                             Neuro:                                         Pain control with   Tylenol PRN    glioblastoma s/p craniotomy - Continue Vimpat for seizure prophylaxis                            Cardiovascular:                                          Continue hemodynamic monitoring.  Remained in NSR                            Respiratory:                                         Stridor / Tracheal stenosis: OS/P Trach  Off oxygen and tolerating Passy-Jas valve                                          Comfortable, not in any distress.                                                             Continue bronchodilators, pulmonary toilet- PRN                               GI                                         On soft diet and Glucerna shakes                                         Continue Zofran / Reglan for nausea - PRN                                         Continue bowel regimen	                                                                 Renal:                                         Monitor I/Os and electrolytes.                                                                                         Hem/ Onc:                                         DVT prophylaxis with SQ Heparin and SCDs                                         Follow CBC in AM                           Infectious disease:                                            Monitor for fever / leukocytosis.                                                                    Endocrine                                            Continue Accu-Checks with coverage       Pertinent clinical, laboratory, radiographic, hemodynamic, echocardiographic, respiratory data, microbiologic data and chart were reviewed and analyzed frequently throughout the course of the day and night  Patient seen, examined and plan discussed with CT Surgeon Dr. Garland / CTICU team during rounds.    OOB to chair and ambulate as tolerated.     I have spent 40 minutes of time with this patient including 20 minutes coordinating care in the ICU and updating family members.         Miky Munoz MD

## 2022-03-19 NOTE — DISCHARGE NOTE PROVIDER - NSDCFUADDINST_GEN_ALL_CORE_FT
Please walk 4-5 x per day, Increase as tolerated. You may climb stairs. Continue to use incentive spirometer.   You may keep all wounds uncovered.  See Dr. Garland's office in 2 weeks. Please call [phone] for an apt.   There are sutures holding the trach in place. The sutures will be taken out at Dr. Garland's office.    Please suction trach 4 times a day, or more if necessary. If you have sudden SOB, or bleeding for the trach site, please go to the nearest ED. Please walk 4-5 x per day, Increase as tolerated. You may climb stairs. Continue to use incentive spirometer.   You may keep all wounds uncovered.  See Dr. Garland's office in 2 weeks. Please call [phone] for an apt.   There are sutures holding the trach in place. The sutures will be taken out at Dr. Garland's office.    A Nurse has been arranged to care for your trach. Your trach is a 7 Portex. Please suction trach 4 times a day, or more if necessary. You may keep the speaking valve on during the day. If you have sudden SOB, or bleeding for the trach site, please go to the nearest ED.

## 2022-03-19 NOTE — DISCHARGE NOTE PROVIDER - NSDCFUSCHEDAPPT_GEN_ALL_CORE_FT
JORDAN CHAKRABORTY ; 03/24/2022 ; [facility] [address]  JORDAN CHAKRABORTY ; 03/26/2022 ; [facility] Endocrin [address]

## 2022-03-19 NOTE — DISCHARGE NOTE PROVIDER - NSDCCPTREATMENT_GEN_ALL_CORE_FT
PRINCIPAL PROCEDURE  Procedure: Creation, tracheostomy, with bronchoscopy  Findings and Treatment:

## 2022-03-19 NOTE — DISCHARGE NOTE PROVIDER - NSDCCPCAREPLAN_GEN_ALL_CORE_FT
PRINCIPAL DISCHARGE DIAGNOSIS  Diagnosis: Difficulty breathing  Assessment and Plan of Treatment:

## 2022-03-20 LAB
ANION GAP SERPL CALC-SCNC: 11 MMOL/L — SIGNIFICANT CHANGE UP (ref 7–14)
BUN SERPL-MCNC: 6 MG/DL — LOW (ref 7–23)
CALCIUM SERPL-MCNC: 9 MG/DL — SIGNIFICANT CHANGE UP (ref 8.4–10.5)
CHLORIDE SERPL-SCNC: 95 MMOL/L — LOW (ref 98–107)
CO2 SERPL-SCNC: 26 MMOL/L — SIGNIFICANT CHANGE UP (ref 22–31)
CREAT SERPL-MCNC: 0.66 MG/DL — SIGNIFICANT CHANGE UP (ref 0.5–1.3)
EGFR: 103 ML/MIN/1.73M2 — SIGNIFICANT CHANGE UP
GLUCOSE BLDC GLUCOMTR-MCNC: 156 MG/DL — HIGH (ref 70–99)
GLUCOSE BLDC GLUCOMTR-MCNC: 167 MG/DL — HIGH (ref 70–99)
GLUCOSE BLDC GLUCOMTR-MCNC: 177 MG/DL — HIGH (ref 70–99)
GLUCOSE BLDC GLUCOMTR-MCNC: 234 MG/DL — HIGH (ref 70–99)
GLUCOSE SERPL-MCNC: 158 MG/DL — HIGH (ref 70–99)
HCT VFR BLD CALC: 31.7 % — LOW (ref 39–50)
HGB BLD-MCNC: 10 G/DL — LOW (ref 13–17)
MAGNESIUM SERPL-MCNC: 1.7 MG/DL — SIGNIFICANT CHANGE UP (ref 1.6–2.6)
MCHC RBC-ENTMCNC: 30 PG — SIGNIFICANT CHANGE UP (ref 27–34)
MCHC RBC-ENTMCNC: 31.5 GM/DL — LOW (ref 32–36)
MCV RBC AUTO: 95.2 FL — SIGNIFICANT CHANGE UP (ref 80–100)
NRBC # BLD: 0 /100 WBCS — SIGNIFICANT CHANGE UP
NRBC # FLD: 0.04 K/UL — HIGH
PHOSPHATE SERPL-MCNC: 3 MG/DL — SIGNIFICANT CHANGE UP (ref 2.5–4.5)
PLATELET # BLD AUTO: 216 K/UL — SIGNIFICANT CHANGE UP (ref 150–400)
POTASSIUM SERPL-MCNC: 4 MMOL/L — SIGNIFICANT CHANGE UP (ref 3.5–5.3)
POTASSIUM SERPL-SCNC: 4 MMOL/L — SIGNIFICANT CHANGE UP (ref 3.5–5.3)
RBC # BLD: 3.33 M/UL — LOW (ref 4.2–5.8)
RBC # FLD: 13.2 % — SIGNIFICANT CHANGE UP (ref 10.3–14.5)
SODIUM SERPL-SCNC: 132 MMOL/L — LOW (ref 135–145)
WBC # BLD: 6.04 K/UL — SIGNIFICANT CHANGE UP (ref 3.8–10.5)
WBC # FLD AUTO: 6.04 K/UL — SIGNIFICANT CHANGE UP (ref 3.8–10.5)

## 2022-03-20 PROCEDURE — 71045 X-RAY EXAM CHEST 1 VIEW: CPT | Mod: 26

## 2022-03-20 PROCEDURE — 99233 SBSQ HOSP IP/OBS HIGH 50: CPT

## 2022-03-20 RX ORDER — MAGNESIUM SULFATE 500 MG/ML
2 VIAL (ML) INJECTION ONCE
Refills: 0 | Status: COMPLETED | OUTPATIENT
Start: 2022-03-20 | End: 2022-03-20

## 2022-03-20 RX ORDER — INSULIN GLARGINE 100 [IU]/ML
16 INJECTION, SOLUTION SUBCUTANEOUS AT BEDTIME
Refills: 0 | Status: DISCONTINUED | OUTPATIENT
Start: 2022-03-20 | End: 2022-03-22

## 2022-03-20 RX ADMIN — LACOSAMIDE 100 MILLIGRAM(S): 50 TABLET ORAL at 06:13

## 2022-03-20 RX ADMIN — HEPARIN SODIUM 5000 UNIT(S): 5000 INJECTION INTRAVENOUS; SUBCUTANEOUS at 06:13

## 2022-03-20 RX ADMIN — Medication 25 GRAM(S): at 06:13

## 2022-03-20 RX ADMIN — ESCITALOPRAM OXALATE 5 MILLIGRAM(S): 10 TABLET, FILM COATED ORAL at 11:39

## 2022-03-20 RX ADMIN — Medication 3 MILLIGRAM(S): at 22:54

## 2022-03-20 RX ADMIN — Medication 1: at 16:36

## 2022-03-20 RX ADMIN — SODIUM CHLORIDE 1 GRAM(S): 9 INJECTION INTRAMUSCULAR; INTRAVENOUS; SUBCUTANEOUS at 06:13

## 2022-03-20 RX ADMIN — HEPARIN SODIUM 5000 UNIT(S): 5000 INJECTION INTRAVENOUS; SUBCUTANEOUS at 13:29

## 2022-03-20 RX ADMIN — ATORVASTATIN CALCIUM 20 MILLIGRAM(S): 80 TABLET, FILM COATED ORAL at 22:54

## 2022-03-20 RX ADMIN — LEVALBUTEROL 0.63 MILLIGRAM(S): 1.25 SOLUTION, CONCENTRATE RESPIRATORY (INHALATION) at 21:35

## 2022-03-20 RX ADMIN — LEVALBUTEROL 0.63 MILLIGRAM(S): 1.25 SOLUTION, CONCENTRATE RESPIRATORY (INHALATION) at 09:45

## 2022-03-20 RX ADMIN — Medication 12.5 MILLIGRAM(S): at 22:53

## 2022-03-20 RX ADMIN — SODIUM CHLORIDE 1 GRAM(S): 9 INJECTION INTRAMUSCULAR; INTRAVENOUS; SUBCUTANEOUS at 21:17

## 2022-03-20 RX ADMIN — LACOSAMIDE 100 MILLIGRAM(S): 50 TABLET ORAL at 17:17

## 2022-03-20 RX ADMIN — INSULIN GLARGINE 16 UNIT(S): 100 INJECTION, SOLUTION SUBCUTANEOUS at 22:54

## 2022-03-20 RX ADMIN — Medication 2: at 11:37

## 2022-03-20 RX ADMIN — Medication 1: at 07:53

## 2022-03-20 RX ADMIN — Medication 1 SPRAY(S): at 17:17

## 2022-03-20 RX ADMIN — Medication 1 SPRAY(S): at 06:14

## 2022-03-20 RX ADMIN — GABAPENTIN 100 MILLIGRAM(S): 400 CAPSULE ORAL at 22:54

## 2022-03-20 RX ADMIN — LEVALBUTEROL 0.63 MILLIGRAM(S): 1.25 SOLUTION, CONCENTRATE RESPIRATORY (INHALATION) at 16:09

## 2022-03-20 RX ADMIN — LEVALBUTEROL 0.63 MILLIGRAM(S): 1.25 SOLUTION, CONCENTRATE RESPIRATORY (INHALATION) at 04:15

## 2022-03-20 RX ADMIN — PANTOPRAZOLE SODIUM 40 MILLIGRAM(S): 20 TABLET, DELAYED RELEASE ORAL at 06:13

## 2022-03-20 RX ADMIN — HEPARIN SODIUM 5000 UNIT(S): 5000 INJECTION INTRAVENOUS; SUBCUTANEOUS at 22:53

## 2022-03-20 NOTE — PROGRESS NOTE ADULT - SUBJECTIVE AND OBJECTIVE BOX
JORDAN CHAKRABORTY                     MRN-[id number]    HPI:    · HPI Objective Statement: 68 y/o M with PMHx of recently dx Grade 4 glioblastoma s/p craniotomy, HTN, T2DM, and recent hospitalization at [facility] for tracheal stenosis p/w severe throat pain, difficulty breathing, and productive cough. Pt discharged on 3/3 after tracheal balloon dilation and biopsy. 4 days ago (5 days post-discharge), pt started having severe 10/10 throat pain, worst under the chin but extending down to the rest of the throat, and having difficulty breathing. Throat pain is worsened with swallowing. Also endorses cough productive of yellow sputum, which has been keeping pt up at night. Denies fever, chills, chest pain, abdominal pain, N/V. Has been tolerating PO intake.  Pt had on 3/2/22 a Bronch, balloon dilation and tracheal biopsy with Dr Garland. Post procedure pt's symptoms improved and pt was eventually discharged to home by medical service with plans for outpt follow up with DR Goldman. OR pathology showed no malignancy. Today pt presents to [facility] ER with worsening cough, SOB, and neck pain x 4 days. Pt seen with DR Garland. Pt has audible inspiratory stridor with some SOB during exam. Daughter and bedside who confirms symptoms. ASsessment done with Dr. Garland speaking to pt and pt's daughter in [place]. Pt looks to be in mild resp distress. On exam coarse wheezing heard throughout. Pt to be admitted to Thoracic surgery, CTICU with plans for urgent add on Bronchoscopy today with DR Chavez or DR. Garland.  (11 Mar 2022 11:36)  Procedure: Flexible bronchoscopy. Significant subglottic stenosis visualized with concern for anterior cricoid cartilage weakness. 11-March-2022      Flexible bronchoscopy. Tracheostomy creation with Portex 7 Cuffed.                        Issues:               Tracheal stenosis                Stridor   Anterior left cricoid cartilage necrosis   DM   Glioblastoma  s/p craniotomy   Tracheostomy status                Home Medications:  acetaminophen 325 mg oral tablet: 2 tab(s) orally every 6 hours, As needed, Mild Pain (1 - 3), Moderate Pain (4 - 6) (03 Mar 2022 18:11)  melatonin 3 mg oral tablet: 1 tab(s) orally once a day (at bedtime) (01 Mar 2022 03:19)  polyethylene glycol 3350 oral powder for reconstitution: 17 gram(s) orally every 12 hours, As Needed for constipation (if no BM x 2 days).  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    Type 2 diabetes mellitus    S/P craniotomy              VITAL SIGNS:  Vital Signs Last 24 Hrs  T(C): 36.9 (20 Mar 2022 12:00), Max: 37.1 (20 Mar 2022 08:00)  T(F): 98.5 (20 Mar 2022 12:00), Max: 98.7 (20 Mar 2022 08:00)  HR: 98 (20 Mar 2022 12:00) (78 - 104)  BP: 96/57 (20 Mar 2022 12:00) (93/51 - 121/55)  BP(mean): 67 (20 Mar 2022 12:00) (59 - 101)  RR: 19 (20 Mar 2022 12:00) (13 - 21)  SpO2: 97% (20 Mar 2022 12:00) (93% - 100%)    I/Os:   I&O's Detail    19 Mar 2022 07:01  -  20 Mar 2022 07:00  --------------------------------------------------------  IN:    Oral Fluid: 720 mL  Total IN: 720 mL    OUT:    Voided (mL): 1350 mL  Total OUT: 1350 mL    Total NET: -630 mL      20 Mar 2022 07:01  -  20 Mar 2022 12:57  --------------------------------------------------------  IN:  Total IN: 0 mL    OUT:    Voided (mL): 350 mL  Total OUT: 350 mL    Total NET: -350 mL          CAPILLARY BLOOD GLUCOSE      POCT Blood Glucose.: 234 mg/dL (20 Mar 2022 11:27)  POCT Blood Glucose.: 177 mg/dL (20 Mar 2022 07:51)  POCT Blood Glucose.: 192 mg/dL (19 Mar 2022 21:56)  POCT Blood Glucose.: 165 mg/dL (19 Mar 2022 16:42)      =======================MEDICATIONS===================  MEDICATIONS  (STANDING):  acetaminophen   IVPB .. 950 milliGRAM(s) IV Intermittent once  atorvastatin 20 milliGRAM(s) Oral at bedtime  dextrose 50% Injectable 12.5 Gram(s) IV Push once  escitalopram 5 milliGRAM(s) Oral daily  fluticasone propionate 50 MICROgram(s)/spray Nasal Spray 1 Spray(s) Both Nostrils two times a day  gabapentin 100 milliGRAM(s) Oral at bedtime  heparin   Injectable 5000 Unit(s) SubCutaneous every 8 hours  insulin glargine Injectable (LANTUS) 16 Unit(s) SubCutaneous at bedtime  insulin lispro (ADMELOG) corrective regimen sliding scale   SubCutaneous three times a day before meals  lacosamide 100 milliGRAM(s) Oral two times a day  levalbuterol Inhalation 0.63 milliGRAM(s) Inhalation every 6 hours  melatonin 3 milliGRAM(s) Oral at bedtime  pantoprazole    Tablet 40 milliGRAM(s) Oral before breakfast  sodium chloride 1 Gram(s) Oral two times a day    MEDICATIONS  (PRN):  diphenhydrAMINE Elixir 12.5 milliGRAM(s) Oral every 4 hours PRN Rash and/or Itching  tetracaine/benzocaine/butamben Spray 1 Spray(s) Topical every 3 hours PRN sore throat      PHYSICAL EXAM============================  General:                         Awake, alert, not in any distress  Neuro:                            Moving all extremities to commands.   Respiratory:	Air entry fair and  bilateral conducted sounds                                           Effort even and unlabored.  CV:		Regular rate and rhythm. Normal S1/S2                                          Distal pulses present.  Abdomen:	                     Soft, non-distended. Bowel sounds present   Skin:		No rash.  Extremities:	Warm, no cyanosis or edema.  Palpable pulses    ============================LABS=========================                        10.0   6.04  )-----------( 216      ( 20 Mar 2022 04:39 )             31.7     03-20    132<L>  |  95<L>  |  6<L>  ----------------------------<  158<H>  4.0   |  26  |  0.66    Ca    9.0      20 Mar 2022 04:39  Phos  3.0     03-20  Mg     1.70     03-20    A/P:  67yMale s/p Flexible bronchoscopy. Significant subglottic stenosis visualized with concern for anterior cricoid cartilage weakness. 11-March-2022, fairly comfortable on Heliox.                             Neuro:                                         Pain control with   Tylenol PRN    glioblastoma s/p craniotomy - Continue Vimpat for seizure prophylaxis                            Cardiovascular:                                          Continue hemodynamic monitoring.  Remained in NSR                            Respiratory:                                         Stridor / Tracheal stenosis: OS/P Trach, trach site clean  Off oxygen and tolerating Passy-Jas valve                                          Comfortable, not in any distress.                                                             Continue bronchodilators, pulmonary toilet- PRN                               GI                                         On soft diet and Glucerna shakes                                         Continue Zofran / Reglan for nausea - PRN                                         Continue bowel regimen	                                                                 Renal:                                         Monitor I/Os and electrolytes.                                                                                         Hem/ Onc:                                         DVT prophylaxis with SQ Heparin and SCDs                                         Follow CBC in AM                           Infectious disease:                                            Monitor for fever / leukocytosis.                                                                    Endocrine                                            Continue Accu-Checks with coverage       Pertinent clinical, laboratory, radiographic, hemodynamic, echocardiographic, respiratory data, microbiologic data and chart were reviewed and analyzed frequently throughout the course of the day and night  Patient seen, examined and plan discussed with CT Surgeon Dr. Garland / CTICU team during rounds.    OOB to chair and ambulate as tolerated.     I have spent 40 minutes of time with this patient including 20 minutes coordinating care in the ICU and updating family members.       Falguni Rock DO FACEP

## 2022-03-21 ENCOUNTER — TRANSCRIPTION ENCOUNTER (OUTPATIENT)
Age: 67
End: 2022-03-21

## 2022-03-21 LAB
GLUCOSE BLDC GLUCOMTR-MCNC: 160 MG/DL — HIGH (ref 70–99)
GLUCOSE BLDC GLUCOMTR-MCNC: 227 MG/DL — HIGH (ref 70–99)
GLUCOSE BLDC GLUCOMTR-MCNC: 231 MG/DL — HIGH (ref 70–99)

## 2022-03-21 PROCEDURE — 99233 SBSQ HOSP IP/OBS HIGH 50: CPT

## 2022-03-21 RX ADMIN — LACOSAMIDE 100 MILLIGRAM(S): 50 TABLET ORAL at 17:55

## 2022-03-21 RX ADMIN — Medication 2: at 08:35

## 2022-03-21 RX ADMIN — LACOSAMIDE 100 MILLIGRAM(S): 50 TABLET ORAL at 06:24

## 2022-03-21 RX ADMIN — Medication 1 SPRAY(S): at 17:54

## 2022-03-21 RX ADMIN — LEVALBUTEROL 0.63 MILLIGRAM(S): 1.25 SOLUTION, CONCENTRATE RESPIRATORY (INHALATION) at 23:04

## 2022-03-21 RX ADMIN — HEPARIN SODIUM 5000 UNIT(S): 5000 INJECTION INTRAVENOUS; SUBCUTANEOUS at 06:25

## 2022-03-21 RX ADMIN — Medication 1 SPRAY(S): at 06:25

## 2022-03-21 RX ADMIN — GABAPENTIN 100 MILLIGRAM(S): 400 CAPSULE ORAL at 21:10

## 2022-03-21 RX ADMIN — LEVALBUTEROL 0.63 MILLIGRAM(S): 1.25 SOLUTION, CONCENTRATE RESPIRATORY (INHALATION) at 09:15

## 2022-03-21 RX ADMIN — SODIUM CHLORIDE 1 GRAM(S): 9 INJECTION INTRAMUSCULAR; INTRAVENOUS; SUBCUTANEOUS at 17:55

## 2022-03-21 RX ADMIN — LEVALBUTEROL 0.63 MILLIGRAM(S): 1.25 SOLUTION, CONCENTRATE RESPIRATORY (INHALATION) at 03:38

## 2022-03-21 RX ADMIN — Medication 1: at 15:52

## 2022-03-21 RX ADMIN — INSULIN GLARGINE 16 UNIT(S): 100 INJECTION, SOLUTION SUBCUTANEOUS at 21:11

## 2022-03-21 RX ADMIN — LEVALBUTEROL 0.63 MILLIGRAM(S): 1.25 SOLUTION, CONCENTRATE RESPIRATORY (INHALATION) at 15:31

## 2022-03-21 RX ADMIN — HEPARIN SODIUM 5000 UNIT(S): 5000 INJECTION INTRAVENOUS; SUBCUTANEOUS at 21:11

## 2022-03-21 RX ADMIN — Medication 3 MILLIGRAM(S): at 21:10

## 2022-03-21 RX ADMIN — ATORVASTATIN CALCIUM 20 MILLIGRAM(S): 80 TABLET, FILM COATED ORAL at 21:10

## 2022-03-21 RX ADMIN — PANTOPRAZOLE SODIUM 40 MILLIGRAM(S): 20 TABLET, DELAYED RELEASE ORAL at 06:24

## 2022-03-21 RX ADMIN — HEPARIN SODIUM 5000 UNIT(S): 5000 INJECTION INTRAVENOUS; SUBCUTANEOUS at 14:34

## 2022-03-21 RX ADMIN — ESCITALOPRAM OXALATE 5 MILLIGRAM(S): 10 TABLET, FILM COATED ORAL at 14:34

## 2022-03-21 RX ADMIN — SODIUM CHLORIDE 1 GRAM(S): 9 INJECTION INTRAMUSCULAR; INTRAVENOUS; SUBCUTANEOUS at 06:24

## 2022-03-21 NOTE — PROGRESS NOTE ADULT - SUBJECTIVE AND OBJECTIVE BOX
JORDAN CHAKRABORTY      67y   Male   MRN-[id number]         No Known Allergies             Daily     Daily Drug Dosing Weight  Height (cm): 170.2 (11 Mar 2022 10:00)  Weight (kg): 64.6 (12 Mar 2022 20:00)  BMI (kg/m2): 22.3 (12 Mar 2022 20:00)  BSA (m2): 1.75 (12 Mar 2022 20:00)    68 y/o M with PMHx of recently dx Grade 4 glioblastoma s/p craniotomy, HTN, T2DM, and recent hospitalization at [facility] for tracheal stenosis p/w severe throat pain, difficulty breathing, and productive cough. Pt discharged on 3/3 after tracheal balloon dilation and biopsy. 4 days ago (5 days post-discharge), pt started having severe 10/10 throat pain, worst under the chin but extending down to the rest of the throat, and having difficulty breathing. Throat pain is worsened with swallowing. Also endorses cough productive of yellow sputum, which has been keeping pt up at night. Denies fever, chills, chest pain, abdominal pain, N/V. Has been tolerating PO intake.  Pt had on 3/2/22 a Bronch, balloon dilation and tracheal biopsy with Dr Garland. Post procedure pt's symptoms improved and pt was eventually discharged to home by medical service with plans for outpt follow up with DR Goldman. OR pathology showed no malignancy. Today pt presents to [facility] ER with worsening cough, SOB, and neck pain x 4 days. Pt seen with DR Garland. Pt has audible inspiratory stridor with some SOB during exam. Daughter and bedside who confirms symptoms. ASsessment done with Dr. Garland speaking to pt and pt's daughter in Swedish. Pt looks to be in mild resp distress. On exam coarse wheezing heard throughout. Pt to be admitted to Thoracic surgery, CTICU with plans for urgent add on Bronchoscopy today with DR Chavez or DR. Garland.  (11 Mar 2022 11:36)    Procedure: Flexible bronchoscopy. Significant subglottic stenosis visualized with concern for anterior cricoid cartilage weakness. 11-March-2022      Flexible bronchoscopy. Tracheostomy creation with Portex 7 Cuffed.                        Issues:               Tracheal stenosis                Stridor   Anterior left cricoid cartilage necrosis   DM   Glioblastoma  s/p craniotomy   Tracheostomy status              Home Medications:  acetaminophen 325 mg oral tablet: 2 tab(s) orally every 6 hours, As needed, Mild Pain (1 - 3), Moderate Pain (4 - 6) (03 Mar 2022 18:11)  melatonin 3 mg oral tablet: 1 tab(s) orally once a day (at bedtime) (01 Mar 2022 03:19)  polyethylene glycol 3350 oral powder for reconstitution: 17 gram(s) orally every 12 hours, As Needed for constipation (if no BM x 2 days).  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    Type 2 diabetes mellitus    S/P craniotomy      Vital Signs Last 24 Hrs  T(C): 36.8 (21 Mar 2022 08:00), Max: 36.9 (20 Mar 2022 12:00)  T(F): 98.2 (21 Mar 2022 08:00), Max: 98.5 (20 Mar 2022 12:00)  HR: 93 (21 Mar 2022 08:00) (84 - 117)  BP: 107/81 (21 Mar 2022 08:00) (91/60 - 130/78)  BP(mean): 87 (21 Mar 2022 08:00) (59 - 99)  RR: 23 (21 Mar 2022 08:00) (14 - 23)  SpO2: 98% (21 Mar 2022 08:00) (93% - 100%)  I&O's Detail    20 Mar 2022 07:01  -  21 Mar 2022 07:00  --------------------------------------------------------  IN:    Oral Fluid: 440 mL  Total IN: 440 mL    OUT:    Voided (mL): 1200 mL  Total OUT: 1200 mL    Total NET: -760 mL        CAPILLARY BLOOD GLUCOSE      POCT Blood Glucose.: 156 mg/dL (20 Mar 2022 22:55)  POCT Blood Glucose.: 167 mg/dL (20 Mar 2022 16:17)  POCT Blood Glucose.: 234 mg/dL (20 Mar 2022 11:27)    Home Medications:  acetaminophen 325 mg oral tablet: 2 tab(s) orally every 6 hours, As needed, Mild Pain (1 - 3), Moderate Pain (4 - 6) (03 Mar 2022 18:11)  melatonin 3 mg oral tablet: 1 tab(s) orally once a day (at bedtime) (01 Mar 2022 03:19)  polyethylene glycol 3350 oral powder for reconstitution: 17 gram(s) orally every 12 hours, As Needed for constipation (if no BM x 2 days).  (01 Mar 2022 03:19)  senna oral tablet: 2 tab(s) orally once a day (at bedtime) (01 Mar 2022 03:19)  Vimpat 100 mg oral tablet: 1 tab(s) orally 2 times a day (01 Mar 2022 03:19)    MEDICATIONS  (STANDING):  acetaminophen   IVPB .. 950 milliGRAM(s) IV Intermittent once  atorvastatin 20 milliGRAM(s) Oral at bedtime  dextrose 50% Injectable 12.5 Gram(s) IV Push once  escitalopram 5 milliGRAM(s) Oral daily  fluticasone propionate 50 MICROgram(s)/spray Nasal Spray 1 Spray(s) Both Nostrils two times a day  gabapentin 100 milliGRAM(s) Oral at bedtime  heparin   Injectable 5000 Unit(s) SubCutaneous every 8 hours  insulin glargine Injectable (LANTUS) 16 Unit(s) SubCutaneous at bedtime  insulin lispro (ADMELOG) corrective regimen sliding scale   SubCutaneous three times a day before meals  lacosamide 100 milliGRAM(s) Oral two times a day  levalbuterol Inhalation 0.63 milliGRAM(s) Inhalation every 6 hours  melatonin 3 milliGRAM(s) Oral at bedtime  pantoprazole    Tablet 40 milliGRAM(s) Oral before breakfast  sodium chloride 1 Gram(s) Oral two times a day    MEDICATIONS  (PRN):  diphenhydrAMINE Elixir 12.5 milliGRAM(s) Oral every 4 hours PRN Rash and/or Itching  tetracaine/benzocaine/butamben Spray 1 Spray(s) Topical every 3 hours PRN sore throat        Physical exam:     General:               Pt is awake, alert,  appears to be comfortable                                                  Neuro:                  Nonfocal                             Cardiovascular:   S1 & S2, regular                           Respiratory:         Air entry is fair and equal on both sides, lungs are clear                          GI:                          Soft, nondistended and nontender, Bowel sounds active                            Ext:                        No cyanosis or edema                          Labs:                                                                           10.0   6.04  )-----------( 216      ( 20 Mar 2022 04:39 )             31.7             03-20    132<L>  |  95<L>  |  6<L>  ----------------------------<  158<H>  4.0   |  26  |  0.66    Ca    9.0      20 Mar 2022 04:39  Phos  3.0     03-20  Mg     1.70     03-20        CXR:     < from: Xray Chest 1 View- PORTABLE-Routine (Xray Chest 1 View- PORTABLE-Routine in AM.) (03.20.22 @ 05:43) >  Tracheostomy tube in place. The lungs are clear, the heart is not   enlarged and there is no effusion or pneumothorax.    COMPARISON:  March 19    IMPRESSION:  Clear lungs with tracheostomy tube.        Plan:  General: 67yMale s/p Flexible bronchoscopy. Significant subglottic stenosis visualized with concern for anterior cricoid cartilage weakness. 11-March-2022, fairly comfortable on Heliox.                             Neuro:                                         Pain control with   Tylenol PRN    glioblastoma s/p craniotomy - Continue Vimpat for seizure prophylaxis                            Cardiovascular:                                          Continue hemodynamic monitoring.  Remained in NSR                            Respiratory:                                         Stridor / Tracheal stenosis: S/P Trach  Off oxygen and tolerating [place] valve                                          Comfortable, not in any respiratory distress.                                                             Continue bronchodilators, pulmonary toilet- PRN                               GI                                         On soft diet and Glucerna shakes                                         Continue Zofran / Reglan for nausea - PRN                                         Continue bowel regimen	                                                                 Renal:                                         Monitor I/Os and electrolytes.                                                                                         Hem/ Onc:                                         DVT prophylaxis with SQ Heparin and SCDs                                                                  Infectious disease:                                            Monitor for fever / leukocytosis.                                                                    Endocrine                                            Continue Accu-Checks with coverage       Pertinent clinical, laboratory, radiographic, hemodynamic, echocardiographic, respiratory data, microbiologic data and chart were reviewed and analyzed frequently throughout the course of the day and night  Patient seen, examined and plan discussed with CT Surgeon Dr. Garland / CTICU team during rounds.    OOB to chair and ambulate as tolerated.     Patient is for discharge today.     I have spent 40 minutes of time with this patient including 20 minutes coordinating care in the ICU and updating family members.             Miky Munoz MD

## 2022-03-21 NOTE — DISCHARGE NOTE NURSING/CASE MANAGEMENT/SOCIAL WORK - NSSCNAMETXT_GEN_ALL_CORE
[facility] at [place] [phone] initial visit will be day after discharge home. A nurse will call prior to the home visit.

## 2022-03-21 NOTE — DISCHARGE NOTE NURSING/CASE MANAGEMENT/SOCIAL WORK - PATIENT PORTAL LINK FT
You can access the FollowMyHealth Patient Portal offered by [facility] by registering at the following website: [email or web address]. By joining [facility]’s FollowMyHealth portal, you will also be able to view your health information using other applications (apps) compatible with our system.

## 2022-03-21 NOTE — CHART NOTE - NSCHARTNOTEFT_GEN_A_CORE
NUTRITION FOLLOW-UP:    Met w/pt and daughter to obtain nutrition related information.  Pt s/p MBS study on 3/18 - pt started on soft and bite sized diet with thin fluids.  Pt and daughter state that he is tolerating diet well and his appetite is good.  Lunch tray observed with ~90% of tray contents consumed.  Possible d/c home today.  Pt and daughter given diet instructions including written material in Lao and English for referral after d/c.  All questions answered.  Pt previously assessed with severe malnutrition 2/2 wt loss and less than 50% food intake PTA. Noted wt stable since hospitalization.      66 y/o M with PMHx of recently dx Grade 4 glioblastoma s/p craniotomy, HTN, T2DM, and recent hospitalization at [facility] for tracheal stenosis p/w severe throat pain, difficulty breathing, and productive cough. Pt discharged on 3/3 after tracheal balloon dilation and biopsy. 4 days ago (5 days post-discharge), pt started having severe 10/10 throat pain, worst under the chin but extending down to the rest of the throat, and having difficulty breathing. Throat pain is worsened with swallowing. Also endorses cough productive of yellow sputum, which has been keeping pt up at night. Denies fever, chills, chest pain, abdominal pain, N/V. Has been tolerating PO intake.  Pt had on 3/2/22 a Bronch, balloon dilation and tracheal biopsy with Dr Garland. Post procedure pt's symptoms improved and pt was eventually discharged to home by medical service with plans for outpt follow up with DR Goldman. OR pathology showed no malignancy. Today pt presents to [facility] ER with worsening cough, SOB, and neck pain x 4 days. Pt seen with DR Garland. Pt has audible inspiratory stridor with some SOB during exam. Daughter and bedside who confirms symptoms. ASsessment done with Dr. Garland speaking to pt and pt's daughter in Lao. Pt looks to be in mild resp distress. On exam coarse wheezing heard throughout. Pt to be admitted to Thoracic surgery, CTICU with plans for urgent add on Bronchoscopy today with DR Chavez or DR. Garland.  (11 Mar 2022 11:36)    Procedure: Flexible bronchoscopy. Significant subglottic stenosis visualized with concern for anterior cricoid cartilage weakness. 11-March-2022      Flexible bronchoscopy. Tracheostomy creation with Portex 7 Cuffed.       Weight:  3/18 - 64.7kg     Adm - 64.6kg     IBW - 67kg  Labs:  H/H 10.0/31.7  Na 132  BUN 6  Glu 158  -234    Current Diet:  Soft and bite sized.  Nutrition Recommendations:  Continue current consistency as tolerated.  Diet should also include Consistent Carbohydrate, which pt has been on PTA.  Suggest f/u with SLP for advancement of diet consistency as appropriate    RD to Remain Available:  yes    Additional Recommendations:   1) Monitor weights, labs, BM's, skin integrity, p.o. intake, FS  2) Possible d/c today  3)

## 2022-03-21 NOTE — DISCHARGE NOTE NURSING/CASE MANAGEMENT/SOCIAL WORK - NSDCPEFALRISK_GEN_ALL_CORE
For information on Fall & Injury Prevention, visit: [email or web address] OR  [email or web address] OR  https://www.cdc.gov/steadi/patient.html

## 2022-03-22 VITALS
OXYGEN SATURATION: 100 % | SYSTOLIC BLOOD PRESSURE: 107 MMHG | RESPIRATION RATE: 16 BRPM | DIASTOLIC BLOOD PRESSURE: 79 MMHG | HEART RATE: 101 BPM

## 2022-03-22 LAB
CULTURE RESULTS: SIGNIFICANT CHANGE UP
GLUCOSE BLDC GLUCOMTR-MCNC: 174 MG/DL — HIGH (ref 70–99)
GLUCOSE BLDC GLUCOMTR-MCNC: 195 MG/DL — HIGH (ref 70–99)
SPECIMEN SOURCE: SIGNIFICANT CHANGE UP

## 2022-03-22 PROCEDURE — 99233 SBSQ HOSP IP/OBS HIGH 50: CPT

## 2022-03-22 RX ADMIN — LEVALBUTEROL 0.63 MILLIGRAM(S): 1.25 SOLUTION, CONCENTRATE RESPIRATORY (INHALATION) at 10:04

## 2022-03-22 RX ADMIN — Medication 1 SPRAY(S): at 05:54

## 2022-03-22 RX ADMIN — HEPARIN SODIUM 5000 UNIT(S): 5000 INJECTION INTRAVENOUS; SUBCUTANEOUS at 13:14

## 2022-03-22 RX ADMIN — LACOSAMIDE 100 MILLIGRAM(S): 50 TABLET ORAL at 05:53

## 2022-03-22 RX ADMIN — SODIUM CHLORIDE 1 GRAM(S): 9 INJECTION INTRAMUSCULAR; INTRAVENOUS; SUBCUTANEOUS at 05:54

## 2022-03-22 RX ADMIN — PANTOPRAZOLE SODIUM 40 MILLIGRAM(S): 20 TABLET, DELAYED RELEASE ORAL at 06:00

## 2022-03-22 RX ADMIN — HEPARIN SODIUM 5000 UNIT(S): 5000 INJECTION INTRAVENOUS; SUBCUTANEOUS at 05:54

## 2022-03-22 RX ADMIN — Medication 1: at 11:41

## 2022-03-22 RX ADMIN — Medication 1: at 07:46

## 2022-03-22 RX ADMIN — ESCITALOPRAM OXALATE 5 MILLIGRAM(S): 10 TABLET, FILM COATED ORAL at 11:43

## 2022-03-22 NOTE — PROGRESS NOTE ADULT - NUTRITIONAL ASSESSMENT
This patient has been assessed with a concern for Malnutrition and has been determined to have a diagnosis/diagnoses of Severe protein-calorie malnutrition.    This patient is being managed with:   Diet Soft and Bite Sized-  Entered: Mar 18 2022  2:05PM    
This patient has been assessed with a concern for Malnutrition and has been determined to have a diagnosis/diagnoses of Severe protein-calorie malnutrition.    This patient is being managed with:   Diet NPO-  Entered: Mar 17 2022  5:41PM    
This patient has been assessed with a concern for Malnutrition and has been determined to have a diagnosis/diagnoses of Severe protein-calorie malnutrition.    This patient is being managed with:   Diet Soft and Bite Sized-  Supplement Feeding Modality:  Oral  Glucerna Shake Cans or Servings Per Day:  2       Frequency:  Two Times a day  Entered: Mar 21 2022  4:50PM    
This patient has been assessed with a concern for Malnutrition and has been determined to have a diagnosis/diagnoses of Severe protein-calorie malnutrition.    This patient is being managed with:   Diet NPO after Midnight-     NPO Start Date: 16-Mar-2022   NPO Start Time: 23:59  Except Medications  Entered: Mar 16 2022 11:03AM    Diet Minced and Moist-  Consistent Carbohydrate {Evening Snack} (CSTCHOSN)  Supplement Feeding Modality:  Oral  Glucerna Shake Cans or Servings Per Day:  2       Frequency:  Three Times a day  Entered: Mar 12 2022  6:08PM    
This patient has been assessed with a concern for Malnutrition and has been determined to have a diagnosis/diagnoses of Severe protein-calorie malnutrition.    This patient is being managed with:   Diet NPO-  Entered: Mar 17 2022  5:41PM    
This patient has been assessed with a concern for Malnutrition and has been determined to have a diagnosis/diagnoses of Severe protein-calorie malnutrition.    This patient is being managed with:   Diet Soft and Bite Sized-  Entered: Mar 18 2022  2:05PM    

## 2022-03-22 NOTE — PROGRESS NOTE ADULT - REASON FOR ADMISSION
SOB< cough

## 2022-03-22 NOTE — PROGRESS NOTE ADULT - PROVIDER SPECIALTY LIST ADULT
Critical Care
Anesthesia
Critical Care

## 2022-03-22 NOTE — PROGRESS NOTE ADULT - SUBJECTIVE AND OBJECTIVE BOX
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 Pt discharged on 3/3 after tracheal balloon dilation and biopsy. 4 days ago (5 days post-discharge), pt started having severe 10/10 throat pain, worst under the chin but extending down to the rest of the throat, and having difficulty breathing. Throat pain is worsened with swallowing. Also endorses cough productive of yellow sputum, which has been keeping pt up at night. Denies fever, chills, chest pain, abdominal pain, N/V. Has been tolerating PO intake.  Pt had on 3/2/22 a Bronch, balloon dilation and tracheal biopsy with Dr Garland. Post procedure pt's symptoms improved and pt was eventually discharged to home by medical service with plans for outpt follow up with DR Goldman. OR pathology showed no malignancy. Today pt presents to [facility] ER with worsening cough, SOB, and neck pain x 4 days. Pt seen with DR Garland. Pt has audible inspiratory stridor with some SOB during exam. Daughter and bedside who confirms symptoms. ASsessment done with Dr. Garland speaking to pt and pt's daughter in [place]. Pt looks to be in mild resp distress. On exam coarse wheezing heard throughout. Pt to be admitted to Thoracic surgery, CTICU with plans for urgent add on Bronchoscopy today with DR Chavez or DR. Garland.  (11 Mar 2022 11:36)      Procedure: Flexible bronchoscopy. Significant subglottic stenosis visualized with concern for anterior cricoid cartilage weakness. 11-March-2022      Flexible bronchoscopy. Tracheostomy creation with Portex 7 Cuffed.                        Issues:               Tracheal stenosis                Stridor   Anterior left cricoid cartilage necrosis   DM   Glioblastoma  s/p craniotomy   Tracheostomy status              Home Medications:  acetaminophen 325 mg oral tablet: 2 tab(s) orally every 6 hours, As needed, Mild Pain (1 - 3), Moderate Pain (4 - 6) (03 Mar 2022 18:11)  melatonin 3 mg oral tablet: 1 tab(s) orally once a day (at bedtime) (01 Mar 2022 03:19)  polyethylene glycol 3350 oral powder for reconstitution: 17 gram(s) orally every 12 hours, As Needed for constipation (if no BM x 2 days).  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    Type 2 diabetes mellitus    S/P craniotomy              VITAL SIGNS:  Vital Signs Last 24 Hrs  T(C): 36.7 (22 Mar 2022 08:00), Max: 37.3 (22 Mar 2022 00:00)  T(F): 98.1 (22 Mar 2022 08:00), Max: 99.1 (22 Mar 2022 00:00)  HR: 102 (22 Mar 2022 10:38) (88 - 118)  BP: 101/61 (22 Mar 2022 10:00) (93/71 - 136/68)  BP(mean): 70 (22 Mar 2022 10:00) (60 - 85)  RR: 15 (22 Mar 2022 10:00) (11 - 24)  SpO2: 98% (22 Mar 2022 10:38) (94% - 100%)    I/Os:   I&O's Detail    21 Mar 2022 07:01  -  22 Mar 2022 07:00  --------------------------------------------------------  IN:    Oral Fluid: 200 mL  Total IN: 200 mL    OUT:    Voided (mL): 1200 mL  Total OUT: 1200 mL    Total NET: -1000 mL      22 Mar 2022 07:01  -  22 Mar 2022 11:01  --------------------------------------------------------  IN:    Oral Fluid: 150 mL  Total IN: 150 mL    OUT:  Total OUT: 0 mL    Total NET: 150 mL          CAPILLARY BLOOD GLUCOSE      POCT Blood Glucose.: 174 mg/dL (22 Mar 2022 07:43)  POCT Blood Glucose.: 231 mg/dL (21 Mar 2022 21:09)  POCT Blood Glucose.: 160 mg/dL (21 Mar 2022 15:50)      =======================MEDICATIONS===================  MEDICATIONS  (STANDING):  acetaminophen   IVPB .. 950 milliGRAM(s) IV Intermittent once  atorvastatin 20 milliGRAM(s) Oral at bedtime  dextrose 50% Injectable 12.5 Gram(s) IV Push once  escitalopram 5 milliGRAM(s) Oral daily  fluticasone propionate 50 MICROgram(s)/spray Nasal Spray 1 Spray(s) Both Nostrils two times a day  gabapentin 100 milliGRAM(s) Oral at bedtime  heparin   Injectable 5000 Unit(s) SubCutaneous every 8 hours  insulin glargine Injectable (LANTUS) 16 Unit(s) SubCutaneous at bedtime  insulin lispro (ADMELOG) corrective regimen sliding scale   SubCutaneous three times a day before meals  lacosamide 100 milliGRAM(s) Oral two times a day  levalbuterol Inhalation 0.63 milliGRAM(s) Inhalation every 6 hours  melatonin 3 milliGRAM(s) Oral at bedtime  pantoprazole    Tablet 40 milliGRAM(s) Oral before breakfast  sodium chloride 1 Gram(s) Oral two times a day    MEDICATIONS  (PRN):  diphenhydrAMINE Elixir 12.5 milliGRAM(s) Oral every 4 hours PRN Rash and/or Itching  tetracaine/benzocaine/butamben Spray 1 Spray(s) Topical every 3 hours PRN sore throat      PHYSICAL EXAM============================  General:                         Awake, alert, not in any distress  Neuro:                            Moving all extremities to commands.   Respiratory:	Air entry fair and  bilateral conducted sounds                                           Effort even and unlabored.  CV:		Regular rate and rhythm. Normal S1/S2                                          Distal pulses present.  Abdomen:	                     Soft, non-distended. Bowel sounds present   Skin:		No rash.  Extremities:	Warm, no cyanosis or edema.  Palpable pulses     67yMale s/p Flexible bronchoscopy. Significant subglottic stenosis visualized with concern for anterior cricoid cartilage weakness. 11-March-2022, fairly comfortable on Heliox.                             Neuro:                                         Pain control with   Tylenol PRN    glioblastoma s/p craniotomy - Continue Vimpat for seizure prophylaxis                            Cardiovascular:                                          Continue hemodynamic monitoring.  Remained in NSR                            Respiratory:                                         Stridor / Tracheal stenosis: S/P Trach  Off oxygen and tolerating Passy-Rewey valve. Voice normal                                          Comfortable, not in any respiratory distress.                                                             Continue bronchodilators, pulmonary toilet- PRN                               GI                                         On soft diet and Glucerna shakes                                         Continue Zofran / Reglan for nausea - PRN                                         Continue bowel regimen	                                                                 Renal:                                         Monitor I/Os and electrolytes.                                                                                         Hem/ Onc:                                         DVT prophylaxis with SQ Heparin and SCDs                                                                  Infectious disease:                                            Monitor for fever / leukocytosis.                                                                    Endocrine                                            Continue Accu-Checks with coverage       Pertinent clinical, laboratory, radiographic, hemodynamic, echocardiographic, respiratory data, microbiologic data and chart were reviewed and analyzed frequently throughout the course of the day and night  Patient seen, examined and plan discussed with CT Surgeon Dr. Garland / CTICU team during rounds.    OOB to chair and ambulate as tolerated.     I have spent 35 minutes of time with this patient including 20 minutes coordinating care in the ICU and updating family members.       Falguni Rock DO, FACEP

## 2022-03-24 ENCOUNTER — NON-APPOINTMENT (OUTPATIENT)
Age: 67
End: 2022-03-24

## 2022-03-24 ENCOUNTER — APPOINTMENT (OUTPATIENT)
Dept: CARDIOLOGY | Facility: CLINIC | Age: 67
End: 2022-03-24
Payer: MEDICARE

## 2022-03-24 VITALS
DIASTOLIC BLOOD PRESSURE: 75 MMHG | OXYGEN SATURATION: 97 % | HEIGHT: 67 IN | TEMPERATURE: 97.8 F | HEART RATE: 108 BPM | SYSTOLIC BLOOD PRESSURE: 108 MMHG | WEIGHT: 152 LBS | BODY MASS INDEX: 23.86 KG/M2

## 2022-03-24 DIAGNOSIS — F32.A DEPRESSION, UNSPECIFIED: ICD-10-CM

## 2022-03-24 DIAGNOSIS — Z00.00 ENCOUNTER FOR GENERAL ADULT MEDICAL EXAMINATION W/OUT ABNORMAL FINDINGS: ICD-10-CM

## 2022-03-24 DIAGNOSIS — Z86.73 PERSONAL HISTORY OF TRANSIENT ISCHEMIC ATTACK (TIA), AND CEREBRAL INFARCTION W/OUT RESIDUAL DEFICITS: ICD-10-CM

## 2022-03-24 DIAGNOSIS — Z82.49 FAMILY HISTORY OF ISCHEMIC HEART DISEASE AND OTHER DISEASES OF THE CIRCULATORY SYSTEM: ICD-10-CM

## 2022-03-24 DIAGNOSIS — R94.31 ABNORMAL ELECTROCARDIOGRAM [ECG] [EKG]: ICD-10-CM

## 2022-03-24 DIAGNOSIS — Z12.5 ENCOUNTER FOR SCREENING FOR MALIGNANT NEOPLASM OF PROSTATE: ICD-10-CM

## 2022-03-24 DIAGNOSIS — Z83.438 FAMILY HISTORY OF OTHER DISORDER OF LIPOPROTEIN METABOLISM AND OTHER LIPIDEMIA: ICD-10-CM

## 2022-03-24 DIAGNOSIS — Z83.3 FAMILY HISTORY OF DIABETES MELLITUS: ICD-10-CM

## 2022-03-24 DIAGNOSIS — D64.9 ANEMIA, UNSPECIFIED: ICD-10-CM

## 2022-03-24 PROCEDURE — G0439: CPT

## 2022-03-24 PROCEDURE — 93000 ELECTROCARDIOGRAM COMPLETE: CPT

## 2022-03-24 RX ORDER — METFORMIN HYDROCHLORIDE 1000 MG/1
1000 TABLET, COATED ORAL
Qty: 60 | Refills: 0 | Status: DISCONTINUED | COMMUNITY
Start: 2022-03-09 | End: 2022-03-24

## 2022-03-24 NOTE — HISTORY OF PRESENT ILLNESS
[FreeTextEntry1] : Here to establish care and for annual physical exam.  [de-identified] : This is a 66yo male with PMH of DM2 and recent diagnosis of Gliosarcoma s/p resection of the left frontal enhancing tumor and Gleolan placed. Patient also with recent hospitalization for tracheal stenosis and  s/p Tracheal balloon dilatation on 3/2/22 and Tracheostomy on 3/17/22. Patient presents today with family members, accompanied by daughter. Patient's daughter reporting his blood glucose has been low, he has appointment to see Dr. Gannon in 2 days, currently on Insulin. Patient's daughter reports that he has been having some discomfort with swallowing but otherwise doing well. Pt to f/u surgeon which is not yet scheduled. No other issues or concerns for today. Patient denies chest pain, shortness of breath, palpitations, dizziness, vision changes, n/v, abdominal pain, changes in bowel/bladder habits,  or appetite.

## 2022-03-25 ENCOUNTER — NON-APPOINTMENT (OUTPATIENT)
Age: 67
End: 2022-03-25

## 2022-03-26 ENCOUNTER — APPOINTMENT (OUTPATIENT)
Dept: CARDIOLOGY | Facility: CLINIC | Age: 67
End: 2022-03-26
Payer: MEDICARE

## 2022-03-26 ENCOUNTER — APPOINTMENT (OUTPATIENT)
Dept: ENDOCRINOLOGY | Facility: CLINIC | Age: 67
End: 2022-03-26
Payer: MEDICARE

## 2022-03-26 VITALS
BODY MASS INDEX: 22.6 KG/M2 | WEIGHT: 144 LBS | OXYGEN SATURATION: 97 % | HEART RATE: 100 BPM | HEIGHT: 67 IN | DIASTOLIC BLOOD PRESSURE: 78 MMHG | TEMPERATURE: 97.9 F | SYSTOLIC BLOOD PRESSURE: 122 MMHG

## 2022-03-26 DIAGNOSIS — Z86.73 PERSONAL HISTORY OF TRANSIENT ISCHEMIC ATTACK (TIA), AND CEREBRAL INFARCTION W/OUT RESIDUAL DEFICITS: ICD-10-CM

## 2022-03-26 PROCEDURE — 93880 EXTRACRANIAL BILAT STUDY: CPT

## 2022-03-26 PROCEDURE — 82962 GLUCOSE BLOOD TEST: CPT

## 2022-03-26 PROCEDURE — 93306 TTE W/DOPPLER COMPLETE: CPT

## 2022-03-26 PROCEDURE — 99204 OFFICE O/P NEW MOD 45 MIN: CPT

## 2022-03-28 DIAGNOSIS — T14.8XXA OTHER INJURY OF UNSPECIFIED BODY REGION, INITIAL ENCOUNTER: ICD-10-CM

## 2022-03-28 DIAGNOSIS — L08.9 OTHER INJURY OF UNSPECIFIED BODY REGION, INITIAL ENCOUNTER: ICD-10-CM

## 2022-03-28 LAB — GLUCOSE BLDC GLUCOMTR-MCNC: 93

## 2022-03-31 ENCOUNTER — OUTPATIENT (OUTPATIENT)
Dept: OUTPATIENT SERVICES | Facility: HOSPITAL | Age: 67
LOS: 1 days | Discharge: HOME | End: 2022-03-31
Payer: MEDICARE

## 2022-03-31 ENCOUNTER — APPOINTMENT (OUTPATIENT)
Dept: RADIATION ONCOLOGY | Facility: CLINIC | Age: 67
End: 2022-03-31
Payer: MEDICARE

## 2022-03-31 DIAGNOSIS — Z98.890 OTHER SPECIFIED POSTPROCEDURAL STATES: Chronic | ICD-10-CM

## 2022-03-31 PROCEDURE — 99205 OFFICE O/P NEW HI 60 MIN: CPT | Mod: 25

## 2022-03-31 PROCEDURE — 77263 THER RADIOLOGY TX PLNG CPLX: CPT

## 2022-03-31 NOTE — REVIEW OF SYSTEMS
[Fatigue] : fatigue [Visual Disturbances] : visual disturbances [Dysphagia] : dysphagia [Abdominal Pain] : abdominal pain [Skin Wound] : skin wound [Depression] : depression [Negative] : Allergic/Immunologic [Blurred Vision: Grade 1 - Intervention not indicated] : Blurred Vision: Grade 1 - Intervention not indicated [Headache: Grade 1 - Mild pain] : Headache: Grade 1 - Mild pain [Peripheral Sensory Neuropathy: Grade 1 - Asymptomatic; loss of deep tendon reflexes or paresthesia] : Peripheral Sensory Neuropathy: Grade 1 - Asymptomatic; loss of deep tendon reflexes or paresthesia [Cough: Grade 1 - Mild symptoms; nonprescription intervention indicated] : Cough: Grade 1 - Mild symptoms; nonprescription intervention indicated

## 2022-03-31 NOTE — REASON FOR VISIT
[Spouse] : spouse [Family Member] : family member [[employer] ] : provided by [employer]   [Interpreters_IDNumber] : [id number] [Interpreters_FullName] : Meaghan [[id number]] : [place]

## 2022-03-31 NOTE — VITALS
[Least Pain Intensity: 0/10] : 0/10 [80: Normal activity with effort; some signs or symptoms of disease.] : 80: Normal activity with effort; some signs or symptoms of disease.  [ECOG Performance Status: 1 - Restricted in physically strenuous activity but ambulatory and able to carry out work of a light or sedentary nature] : Performance Status: 1 - Restricted in physically strenuous activity but ambulatory and able to carry out work of a light or sedentary nature, e.g., light house work, office work [Maximal Pain Intensity: 4/10] : 4/10 [Pain Location: ___] : Pain Location: [unfilled] [Date: ____________] : Patient's last distress assessment performed on [unfilled]. [7 - Distress Level] : Distress Level: 7 [Referred Patient  to social work for follow-up] : Patient was referred to social work for follow-up

## 2022-03-31 NOTE — HISTORY OF PRESENT ILLNESS
[FreeTextEntry1] : Mr. Hernandez is 67 year old male with Gliosarcoma WHO Grade 4 s/p resection of the left frontal enhancing tumor and Gleolan placed, presents today for consideration of RT.\par \par Oncology Hx;\par 1/7/2022 - presented to [facility] [place] with headaches and cognitive changes and was diagnosed with brain tumor. [place] and 1/24 presented to [facility] with c/o confused speech and noted to responding by answering his name to all questions.\par \par 1/24/2022 CT Brain noted extensive lesion and surrounding vasogenic edema in left frontal and temporal lobes with involvement of the corpus callosum and medial left frontal lobe with marked ventricular effacement and 1.4 cm left to right midline shift.\par \par 1/25/2022 MRI Brain noted evidence of mass lesion in the left frontal lobe with significant mass effect on the left lateral ventricle and midline shift. Surrounding vasogenic edema. Subfalcine herniation. Suspicion of possible invasion of the corpus callosum. \par \par 1/27/2022 - he underwent left frontal craniotomy and resection of left frontal enhancing tumor and Gleolan placement by / Candy. Pathology revealed Gliosarcoma,  IDH-1 negative.\par \par 2/28/2022 - He returned to [facility] ED with complaints of difficulty swallowing and vocal cord injury. and 3/2/2022 - he underwent tracheal stenosis, bronchial lavage and trachea biopsy, negative for malignancy.\par \par He followed up with Dr. Gupta and Dr. Hill with discussion of starting adjuvant therapy, RT and possible Optune placement.\par \par 3/31/2022- Presents today to discuss treatment. Recovering well from his surgery. Notes generalized weakness, fatigue, blurred vision, and occasional neuropathy in feet since surgery. Tracheostomy appears clean and dry; he reports cough, mild 4/10 throat pain, tolerating soft diet. Having occasional HA (worse in evening) that resolves with Tylenol. Currently working with PT. When asked about emotional distress, pt appeared to struggle with disclosure. Daughter notes he recently started on Escitalopram 5 mg.

## 2022-04-01 ENCOUNTER — APPOINTMENT (OUTPATIENT)
Dept: MRI IMAGING | Facility: IMAGING CENTER | Age: 67
End: 2022-04-01
Payer: MEDICARE

## 2022-04-01 ENCOUNTER — OUTPATIENT (OUTPATIENT)
Dept: OUTPATIENT SERVICES | Facility: HOSPITAL | Age: 67
LOS: 1 days | Discharge: HOME | End: 2022-04-01

## 2022-04-01 ENCOUNTER — RESULT REVIEW (OUTPATIENT)
Age: 67
End: 2022-04-01

## 2022-04-01 ENCOUNTER — APPOINTMENT (OUTPATIENT)
Dept: HEMATOLOGY ONCOLOGY | Facility: CLINIC | Age: 67
End: 2022-04-01

## 2022-04-01 ENCOUNTER — OUTPATIENT (OUTPATIENT)
Dept: OUTPATIENT SERVICES | Facility: HOSPITAL | Age: 67
LOS: 1 days | End: 2022-04-01
Payer: COMMERCIAL

## 2022-04-01 ENCOUNTER — NON-APPOINTMENT (OUTPATIENT)
Age: 67
End: 2022-04-01

## 2022-04-01 DIAGNOSIS — Z98.890 OTHER SPECIFIED POSTPROCEDURAL STATES: Chronic | ICD-10-CM

## 2022-04-01 DIAGNOSIS — C71.9 MALIGNANT NEOPLASM OF BRAIN, UNSPECIFIED: ICD-10-CM

## 2022-04-01 LAB
BASOPHILS # BLD AUTO: 0.02 K/UL — SIGNIFICANT CHANGE UP (ref 0–0.2)
BASOPHILS NFR BLD AUTO: 0.2 % — SIGNIFICANT CHANGE UP (ref 0–2)
EOSINOPHIL # BLD AUTO: 0.19 K/UL — SIGNIFICANT CHANGE UP (ref 0–0.5)
EOSINOPHIL NFR BLD AUTO: 1.7 % — SIGNIFICANT CHANGE UP (ref 0–6)
HCT VFR BLD CALC: 39.8 % — SIGNIFICANT CHANGE UP (ref 39–50)
HGB BLD-MCNC: 12.6 G/DL — LOW (ref 13–17)
IMM GRANULOCYTES NFR BLD AUTO: 0.4 % — SIGNIFICANT CHANGE UP (ref 0–1.5)
LYMPHOCYTES # BLD AUTO: 1.13 K/UL — SIGNIFICANT CHANGE UP (ref 1–3.3)
LYMPHOCYTES # BLD AUTO: 10 % — LOW (ref 13–44)
MCHC RBC-ENTMCNC: 30.2 PG — SIGNIFICANT CHANGE UP (ref 27–34)
MCHC RBC-ENTMCNC: 31.7 G/DL — LOW (ref 32–36)
MCV RBC AUTO: 95.4 FL — SIGNIFICANT CHANGE UP (ref 80–100)
MONOCYTES # BLD AUTO: 0.6 K/UL — SIGNIFICANT CHANGE UP (ref 0–0.9)
MONOCYTES NFR BLD AUTO: 5.3 % — SIGNIFICANT CHANGE UP (ref 2–14)
NEUTROPHILS # BLD AUTO: 9.35 K/UL — HIGH (ref 1.8–7.4)
NEUTROPHILS NFR BLD AUTO: 82.4 % — HIGH (ref 43–77)
NRBC # BLD: 0 /100 WBCS — SIGNIFICANT CHANGE UP (ref 0–0)
PLATELET # BLD AUTO: 251 K/UL — SIGNIFICANT CHANGE UP (ref 150–400)
RBC # BLD: 4.17 M/UL — LOW (ref 4.2–5.8)
RBC # FLD: 13.7 % — SIGNIFICANT CHANGE UP (ref 10.3–14.5)
WBC # BLD: 11.33 K/UL — HIGH (ref 3.8–10.5)
WBC # FLD AUTO: 11.33 K/UL — HIGH (ref 3.8–10.5)

## 2022-04-01 PROCEDURE — 70553 MRI BRAIN STEM W/O & W/DYE: CPT | Mod: 26

## 2022-04-01 PROCEDURE — 70553 MRI BRAIN STEM W/O & W/DYE: CPT

## 2022-04-01 PROCEDURE — A9585: CPT

## 2022-04-01 PROCEDURE — 77334 RADIATION TREATMENT AID(S): CPT | Mod: 26

## 2022-04-02 LAB
CULTURE RESULTS: SIGNIFICANT CHANGE UP
SPECIMEN SOURCE: SIGNIFICANT CHANGE UP

## 2022-04-04 ENCOUNTER — APPOINTMENT (OUTPATIENT)
Dept: THORACIC SURGERY | Facility: CLINIC | Age: 67
End: 2022-04-04
Payer: MEDICARE

## 2022-04-04 VITALS
HEIGHT: 67 IN | HEART RATE: 108 BPM | SYSTOLIC BLOOD PRESSURE: 112 MMHG | DIASTOLIC BLOOD PRESSURE: 77 MMHG | RESPIRATION RATE: 18 BRPM | BODY MASS INDEX: 21.82 KG/M2 | WEIGHT: 139 LBS | OXYGEN SATURATION: 96 %

## 2022-04-04 DIAGNOSIS — F41.1 GENERALIZED ANXIETY DISORDER: ICD-10-CM

## 2022-04-04 DIAGNOSIS — F41.0 GENERALIZED ANXIETY DISORDER: ICD-10-CM

## 2022-04-04 DIAGNOSIS — Z86.39 PERSONAL HISTORY OF OTHER ENDOCRINE, NUTRITIONAL AND METABOLIC DISEASE: ICD-10-CM

## 2022-04-04 DIAGNOSIS — Z86.59 PERSONAL HISTORY OF OTHER MENTAL AND BEHAVIORAL DISORDERS: ICD-10-CM

## 2022-04-04 PROCEDURE — 99215 OFFICE O/P EST HI 40 MIN: CPT

## 2022-04-04 NOTE — ASSESSMENT
[FreeTextEntry1] : Mr. JORDAN CHAKRABORTY, 67 year old male, w/ hx of HTN, DM on Insulin, recently Dx with Grade 4 Gliosarcoma s/p left sided craniotomy with gleloan on 01/27/22 currently on Vimpat 100mg BID and PO decadron recently re-admitted on 02/11/22 because he was found to be + COVID and could not be discharged to [facility] during admission patient had significant functional improvements and was discharged home on 02/24/22 presenting from outpatient ENT office for "b/l vocal cord edema and mucous coming from trachea." \par \par Now s/p FB, tracheal biopsy, dilation from 8 to 15 mm and BAL on 3/2/22. Path revealed squamous mucosa with underlying granulation inflammation, negative for carcinoma, likely represents squamous metaplasia given the site of the biopsy. \par \par Now s/p FB on 3/11/22 for suspected subglottic stenosis. It showed stenosis noted with significant inflammatory changes and granulation tissue in the immediate subglottic space at level of cricothyroid membrane and cricoid cartilage. PAth of larynx revealed acute and chronic inflammation and granulation tissue formation. \par \par Now s/p FB and tracheostomy on 3/17/22. \par \par I have reviewed the patient's medical records and diagnostic images at time of this office consultation and have made the following recommendation:\par 1. No need further Thoracic surgery follow up required. Can return to clinic as needed. Follow up with RadOnc per their recommendations. \par 2. Patient's family member endorsing inner cannula from hospital easier to replace. Will send script for Portex Size 7 inner cannula. \par \par Recommendations reviewed with patient during this office visit, and all questions answered; Patient instructed on the importance of follow up and verbalizes understanding.\par \par I personally performed the services described in the documentation, reviewed the documentation recorded by the scribe in my presence and it accurately and completely records my words and actions.\par \par I, Ml Lynn, ANP-C, am scribing for and the presence of JASMINE Arndt, the following sections HISTORY OF PRESENT ILLNESS, PAST MEDICAL/FAMILY/SOCIAL HISTORY; REVIEW OF SYSTEMS; VITAL SIGNS; PHYSICAL EXAM; DISPOSITION.\par \par \par \par

## 2022-04-04 NOTE — PHYSICAL EXAM
[Sclera] : the sclera and conjunctiva were normal [PERRL With Normal Accommodation] : pupils were equal in size, round, and reactive to light [Extraocular Movements] : extraocular movements were intact [] : no respiratory distress [Respiration, Rhythm And Depth] : normal respiratory rhythm and effort [Exaggerated Use Of Accessory Muscles For Inspiration] : no accessory muscle use [Auscultation Breath Sounds / Voice Sounds] : lungs were clear to auscultation bilaterally [Heart Rate And Rhythm] : heart rate was normal and rhythm regular [Examination Of The Chest] : the chest was normal in appearance [Chest Visual Inspection Thoracic Asymmetry] : no chest asymmetry [Diminished Respiratory Excursion] : normal chest expansion [2+] : left 2+ [Cervical Lymph Nodes Enlarged Posterior Bilaterally] : posterior cervical [Cervical Lymph Nodes Enlarged Anterior Bilaterally] : anterior cervical [Supraclavicular Lymph Nodes Enlarged Bilaterally] : supraclavicular [Involuntary Movements] : no involuntary movements were seen [Skin Color & Pigmentation] : normal skin color and pigmentation [Oriented To Time, Place, And Person] : oriented to person, place, and time

## 2022-04-04 NOTE — HISTORY OF PRESENT ILLNESS
[FreeTextEntry1] : Mr. JORDAN CHAKRABORTY, 67 year old male, w/ hx of HTN, DM on Insulin, recently Dx with Grade 4 Gliosarcoma s/p left sided craniotomy with gleloan on 01/27/22 currently on Vimpat 100mg BID and PO decadron recently re-admitted on 02/11/22 because he was found to be + COVID and could not be discharged to [facility] during admission patient had significant functional improvements and was discharged home on 02/24/22 presenting from outpatient ENT office for "b/l vocal cord edema and mucous coming from trachea." \par \par Now s/p FB, tracheal biopsy, dilation from 8 to 15 mm and BAL on 3/2/22. Path revealed squamous mucosa with underlying granulation inflammation, negative for carcinoma, likely represents squamous metaplasia given the site of the biopsy. \par \par Now s/p FB on 3/11/22 for suspected subglottic stenosis. It showed stenosis noted with significant inflammatory changes and granulation tissue in the immediate subglottic space at level of cricothyroid membrane and cricoid cartilage. PAth of larynx revealed acute and chronic inflammation and granulation tissue formation. \par \par Now s/p FB and tracheostomy on 3/17/22. \par \par Pt presents today for follow up. Today endorses productive cough; Cleaning inner cannula daily; Tolerating PO; Increasing activity as tolerated. No fever, chills, night sweats, lightheadedness, dizziness or hemoptysis. \par \par \par \par \par \par \par

## 2022-04-06 ENCOUNTER — APPOINTMENT (OUTPATIENT)
Dept: NEUROSURGERY | Facility: CLINIC | Age: 67
End: 2022-04-06
Payer: MEDICARE

## 2022-04-06 ENCOUNTER — APPOINTMENT (OUTPATIENT)
Dept: NEUROLOGY | Facility: CLINIC | Age: 67
End: 2022-04-06
Payer: MEDICARE

## 2022-04-06 VITALS
HEIGHT: 67 IN | HEART RATE: 103 BPM | WEIGHT: 141 LBS | BODY MASS INDEX: 22.13 KG/M2 | RESPIRATION RATE: 16 BRPM | SYSTOLIC BLOOD PRESSURE: 98 MMHG | DIASTOLIC BLOOD PRESSURE: 68 MMHG | OXYGEN SATURATION: 96 %

## 2022-04-06 PROCEDURE — 99214 OFFICE O/P EST MOD 30 MIN: CPT

## 2022-04-06 PROCEDURE — 99215 OFFICE O/P EST HI 40 MIN: CPT

## 2022-04-06 RX ORDER — LACOSAMIDE 100 MG/1
100 TABLET, FILM COATED ORAL
Qty: 60 | Refills: 3 | Status: DISCONTINUED | COMMUNITY
Start: 2022-03-09 | End: 2022-04-06

## 2022-04-06 RX ORDER — NORMAL SALT TABLETS 1 G/G
1 TABLET ORAL
Qty: 60 | Refills: 2 | Status: DISCONTINUED | COMMUNITY
Start: 2022-03-09 | End: 2022-04-06

## 2022-04-06 NOTE — HISTORY OF PRESENT ILLNESS
[FreeTextEntry1] : 68 yo RH man with PMHx HTN, DM, glioblastoma (MGMT methylated), presents for opinion regarding ongoing neuro-oncological care.\par \par He recently (2/28/22) was seen for throat pain/difficulty swallowing. ED notes indicate, "Had NGtube during his stay that removed before he was discharged. Since the removal he has been experiencing throat pain and upper chest pain. Seen by ENT outpt and was noted to have vocal cord swelling and or irritation and sent in for CT. Having difficulty swallowing. Eating pureed food and still having difficulty swallowing due to pain."\par \par ONCOLOGIC HISTORY\par 1/7/2022 - [place] [facility] [place] [facility] with headaches and cognitive changes and was diagnosed with brain tumor\par 1/24/2022 - presented to [facility] ED with complaints of confused speech (only states name to all questions asked)\par 1/27/2022 - s/p resection of LEFT frontal enhancing tumor with GLEOLAN placed.\par PATH: Gliosarcoma, no ATRX mutation on immunostains, MGMT methylated.\par 2/4/2022 - COVID testing NEGATIVE\par 2/8/2022 - d/c from [facility] to [facility] on salt tablets, insulin, and decadron \par 2/9/2022 - COVID POSITIVE, but thought to be FALSE positive as others negative\par 2/10/2022 - staples removed.\par 2/24/2022 - saw ENT for hoarse voice ( Dr. Graham Bergeron [phone])\par 2/28/2022 - returned to [facility] ED from rehab with complaints of difficulty swallowing and vocal cord injury.\par 3/2/2022 - s/p tracheal stenosis procedure by ENT\par 3/14/22 - s/p continued hospitalization for tracheal stenosis and cricoid cartilage necrosis.\par 3/17/22 - s/p FB and tracheostomy with biopsy of trachea for suspected subglottic stenosis.\par PATH: granulation, inflammation, no carcinoma.\par 3/26/22 - seen by Endocrine (IVY)\par 3/31/22 - seen by rad onc (CLAYTON) on lexapro 5mg daily.\par 4/4/22 - seen by surgery \par \par He presents today for follow-up visit with new MRI, accompanied by his daughter. \par \par He now has a tracheostomy and breathing is better, although he has a persistent cough that makes it difficult for him to sleep. He has followed up with CT surgery. \par \par He saw Dr. Guevara and RT planning is underway. Chemotherapy has been ordered and is being held at [facility]. \par \par He is generally feeling well with no headaches, one recent episode of vomiting but no persistent nausea. Eating and drinking well. Gait is steady. Speech is clear. Daughter notes some mild short-term memory impairment. \par \par \par \par \par \par \par

## 2022-04-06 NOTE — PHYSICAL EXAM
[FreeTextEntry1] : Ox3\par PERRL 4->3 & EOMI OU\par FS/TM\par DUMONT 5/5 w/o pronator drift\par Bicoronal incision well healed w/o erythema or skin breakdown

## 2022-04-06 NOTE — ASSESSMENT
[FreeTextEntry1] : 68 yo who is overall doing well following his L frontal gliosarcoma resection. His adjuvant therapy has been delayed past ideal but recent MR does suggest a durable resection, at least in the short term. He has been seen by Elvis Guevara and Liz (today) and adjuvant chemoradiation are starting soon. He has no ongoing neurosurgical needs at this time. Can f/u w/ Dr. Gupta following completion of his adjuvant therapy, sooner for cause.

## 2022-04-06 NOTE — PHYSICAL EXAM
[General Appearance - Alert] : alert [General Appearance - In No Acute Distress] : in no acute distress [Oriented To Time, Place, And Person] : oriented to person, place, and time [Impaired Insight] : insight and judgment were intact [Affect] : the affect was normal [Mood] : the mood was normal [Person] : oriented to person [Place] : oriented to place [Time] : oriented to time [Remote Intact] : remote memory intact [Span Intact] : the attention span was normal [Concentration Intact] : normal concentrating ability [Visual Intact] : visual attention was ~T not ~L decreased [Naming Objects] : no difficulty naming common objects [Repeating Phrases] : no difficulty repeating a phrase [Fluency] : fluency intact [Comprehension] : comprehension intact [Cranial Nerves Optic (II)] : visual acuity intact bilaterally,  visual fields full to confrontation, pupils equal round and reactive to light [Cranial Nerves Oculomotor (III)] : extraocular motion intact [Cranial Nerves Trigeminal (V)] : facial sensation intact symmetrically [Cranial Nerves Facial (VII)] : face symmetrical [Cranial Nerves Vestibulocochlear (VIII)] : hearing was intact bilaterally [Cranial Nerves Glossopharyngeal (IX)] : tongue and palate midline [Cranial Nerves Accessory (XI - Cranial And Spinal)] : head turning and shoulder shrug symmetric [Cranial Nerves Hypoglossal (XII)] : there was no tongue deviation with protrusion [Motor Tone] : muscle tone was normal in all four extremities [Motor Strength] : muscle strength was normal in all four extremities [Involuntary Movements] : no involuntary movements were seen [No Muscle Atrophy] : normal bulk in all four extremities [Sensation Tactile Decrease] : light touch was intact [Abnormal Walk] : normal gait [Balance] : balance was intact [Sclera] : the sclera and conjunctiva were normal [Extraocular Movements] : extraocular movements were intact [Respiration, Rhythm And Depth] : normal respiratory rhythm and effort [Edema] : there was no peripheral edema [Skin Color & Pigmentation] : normal skin color and pigmentation [Dysdiadochokinesia Bilaterally] : not present [Coordination - Dysmetria Impaired Finger-to-Nose Bilateral] : not present [FreeTextEntry1] : tracheostomy

## 2022-04-06 NOTE — DISCUSSION/SUMMARY
[FreeTextEntry1] : INSOMNIA -- He requests a medication for sleep. i explained that I do not want him to take sedatives at this time, as his brain tumor might cause sleepiness and we wish to monitor him for that.\par \par COUGH -- His cardiothoracic surgery team advised him to discuss with us which medications would NOT interfere with his cough. Importantly, he is afebrile and has not had a fever at home. I referred him to pulmonology (Dr Elias, if available) for management. The chemotherapy he will be taking (temozolomide) does not interact with any antibiotics or cough medications.\par \par SEIZURES -- He requests a refill for his VImpat 100-100. However, he has never had a seizure, so I do not think he still needs to take this medicaiton.\par \par BRAIN TUMOR - MRI reviewed and he is doing well clinically. He is likely to begin radiation within the next 1-2 weeks. Chemo teaching was performed. Briefly, we discussed the planned use of temozolomide. The drug will be taken on an empty stomach, typically at bedtime, 60 minutes after taking an anti-emetic medication, called ondansetron.\par \par Anticipated side effects include constipation, nausea, perhaps vomiting, and possibly low blood counts. A discussion regarding the management of constipation was held and written instructions were provided. We also discussed how to manage nausea with the anti-emetic medication. We discussed when to call us for help. We also discussed safe handling of the medication for the pt and caregivers. \par \par The kind of blood count issues that could complicate therapy and rarely threaten life were reviewed. The risk for low counts is approximately 5% or 1 in 20 patients. Women are more at risk than men. The elderly are also at greater risk. The importance of regular bloodwork was reinforced. Baseline CBC and CMP done, and Rx provided for weekly CBC during chemoradiation. \par \par Written instructions were provided and a plan for close interval follow-up was made.\par \par The patient and caregiver expressed a clear understanding of the issues at hand. All questions were answered. \par \par TMZ dose: 140mg \par Start date: TBD - pending RT start date \par \par DISPO – To follow-up for mid-chemoradiation visit. \par

## 2022-04-06 NOTE — REASON FOR VISIT
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 Since, he has had intermittent emesis w/o HA. His vision is unimpaired. He continues to walk slowly and requires full focus so he does not fall. His diet/nutrition has been decreased in the past several weeks. He continues to produce copious mucus, especially at night and his having difficulty sleeping.

## 2022-04-06 NOTE — DATA REVIEWED
[de-identified] : again seen is near-total resection of his L frontal tumor, with small foci of enhancment seen along the superolateral and infromedial aspects of the resection cavity. slightly increased lateral ventricular size c/t prior.

## 2022-04-11 PROCEDURE — 77301 RADIOTHERAPY DOSE PLAN IMRT: CPT | Mod: 26

## 2022-04-11 PROCEDURE — 77300 RADIATION THERAPY DOSE PLAN: CPT | Mod: 26

## 2022-04-11 PROCEDURE — 77338 DESIGN MLC DEVICE FOR IMRT: CPT | Mod: 26

## 2022-04-12 ENCOUNTER — NON-APPOINTMENT (OUTPATIENT)
Age: 67
End: 2022-04-12

## 2022-04-12 RX ORDER — INSULIN GLARGINE 100 [IU]/ML
100 INJECTION, SOLUTION SUBCUTANEOUS
Refills: 0 | Status: DISCONTINUED | COMMUNITY
Start: 2022-03-09 | End: 2022-04-12

## 2022-04-12 RX ORDER — PEN NEEDLE, DIABETIC 29 G X1/2"
32G X 4 MM NEEDLE, DISPOSABLE MISCELLANEOUS
Qty: 4 | Refills: 3 | Status: ACTIVE | COMMUNITY
Start: 2022-04-12 | End: 1900-01-01

## 2022-04-12 RX ORDER — FLUTICASONE PROPIONATE 50 UG/1
50 SPRAY, METERED NASAL DAILY
Qty: 16 | Refills: 2 | Status: ACTIVE | COMMUNITY
Start: 2022-03-24 | End: 1900-01-01

## 2022-04-13 ENCOUNTER — NON-APPOINTMENT (OUTPATIENT)
Age: 67
End: 2022-04-13

## 2022-04-13 RX ORDER — INSULIN ASPART 100 [IU]/ML
100 INJECTION, SOLUTION INTRAVENOUS; SUBCUTANEOUS
Qty: 1 | Refills: 1 | Status: ACTIVE | COMMUNITY
Start: 2022-04-13 | End: 1900-01-01

## 2022-04-13 RX ORDER — INSULIN GLARGINE 100 [IU]/ML
100 INJECTION, SOLUTION SUBCUTANEOUS
Qty: 1 | Refills: 1 | Status: DISCONTINUED | COMMUNITY
Start: 2022-04-12 | End: 2022-04-13

## 2022-04-13 RX ORDER — INSULIN LISPRO 100 [IU]/ML
100 INJECTION, SOLUTION INTRAVENOUS; SUBCUTANEOUS
Qty: 1 | Refills: 1 | Status: DISCONTINUED | COMMUNITY
Start: 2022-03-09 | End: 2022-04-13

## 2022-04-18 ENCOUNTER — RESULT REVIEW (OUTPATIENT)
Age: 67
End: 2022-04-18

## 2022-04-18 ENCOUNTER — RX RENEWAL (OUTPATIENT)
Age: 67
End: 2022-04-18

## 2022-04-18 ENCOUNTER — APPOINTMENT (OUTPATIENT)
Dept: HEMATOLOGY ONCOLOGY | Facility: CLINIC | Age: 67
End: 2022-04-18

## 2022-04-18 LAB
BASOPHILS # BLD AUTO: 0.06 K/UL — SIGNIFICANT CHANGE UP (ref 0–0.2)
BASOPHILS NFR BLD AUTO: 0.6 % — SIGNIFICANT CHANGE UP (ref 0–2)
EOSINOPHIL # BLD AUTO: 0.68 K/UL — HIGH (ref 0–0.5)
EOSINOPHIL NFR BLD AUTO: 6.6 % — HIGH (ref 0–6)
HCT VFR BLD CALC: 38.9 % — LOW (ref 39–50)
HGB BLD-MCNC: 12.7 G/DL — LOW (ref 13–17)
IMM GRANULOCYTES NFR BLD AUTO: 0.7 % — SIGNIFICANT CHANGE UP (ref 0–1.5)
LYMPHOCYTES # BLD AUTO: 2.68 K/UL — SIGNIFICANT CHANGE UP (ref 1–3.3)
LYMPHOCYTES # BLD AUTO: 25.8 % — SIGNIFICANT CHANGE UP (ref 13–44)
MCHC RBC-ENTMCNC: 30.1 PG — SIGNIFICANT CHANGE UP (ref 27–34)
MCHC RBC-ENTMCNC: 32.6 G/DL — SIGNIFICANT CHANGE UP (ref 32–36)
MCV RBC AUTO: 92.2 FL — SIGNIFICANT CHANGE UP (ref 80–100)
MONOCYTES # BLD AUTO: 0.73 K/UL — SIGNIFICANT CHANGE UP (ref 0–0.9)
MONOCYTES NFR BLD AUTO: 7 % — SIGNIFICANT CHANGE UP (ref 2–14)
NEUTROPHILS # BLD AUTO: 6.15 K/UL — SIGNIFICANT CHANGE UP (ref 1.8–7.4)
NEUTROPHILS NFR BLD AUTO: 59.3 % — SIGNIFICANT CHANGE UP (ref 43–77)
NRBC # BLD: 0 /100 WBCS — SIGNIFICANT CHANGE UP (ref 0–0)
PLATELET # BLD AUTO: 208 K/UL — SIGNIFICANT CHANGE UP (ref 150–400)
RBC # BLD: 4.22 M/UL — SIGNIFICANT CHANGE UP (ref 4.2–5.8)
RBC # FLD: 14.4 % — SIGNIFICANT CHANGE UP (ref 10.3–14.5)
WBC # BLD: 10.37 K/UL — SIGNIFICANT CHANGE UP (ref 3.8–10.5)
WBC # FLD AUTO: 10.37 K/UL — SIGNIFICANT CHANGE UP (ref 3.8–10.5)

## 2022-04-18 PROCEDURE — G6002: CPT | Mod: 26

## 2022-04-19 PROCEDURE — G6002: CPT | Mod: 26

## 2022-04-20 ENCOUNTER — NON-APPOINTMENT (OUTPATIENT)
Age: 67
End: 2022-04-20

## 2022-04-20 VITALS — SYSTOLIC BLOOD PRESSURE: 99 MMHG | DIASTOLIC BLOOD PRESSURE: 60 MMHG

## 2022-04-20 VITALS
OXYGEN SATURATION: 96 % | WEIGHT: 144.8 LBS | HEART RATE: 86 BPM | TEMPERATURE: 98 F | SYSTOLIC BLOOD PRESSURE: 89 MMHG | DIASTOLIC BLOOD PRESSURE: 54 MMHG | RESPIRATION RATE: 16 BRPM | BODY MASS INDEX: 22.68 KG/M2

## 2022-04-20 PROCEDURE — G6002: CPT | Mod: 26

## 2022-04-20 NOTE — HISTORY OF PRESENT ILLNESS
[FreeTextEntry1] : Mr. Norwood presents today for on treatment visit. He has completed 600/6000 cgy of radiation to the left partial brainfor treatment of a gliosarcoma. \par \par He is doing well so far with treatment. Notes some right sided headaches, 4/10 since starting treatment monday. Notes nausea worse at night and Dizziness in the morning as well, both starting 4/18. \par He is not particularly bothered by headaches. has short term memory issues since surgery.\par not on any steroids

## 2022-04-20 NOTE — DISEASE MANAGEMENT
[Clinical] : TNM Stage: c [N/A] : Currently not applicable [TTNM] : X [NTNM] : X [MTNM] : X [de-identified] : 600 cgy [de-identified] : 6000 cgy [de-identified] : left partial brain

## 2022-04-20 NOTE — PHYSICAL EXAM
[General Appearance - Well Developed] : well developed [] : no respiratory distress [Normal] : no joint swelling, no clubbing or cyanosis of the fingernails and muscle strength and tone were normal [No Focal Deficits] : no focal deficits [Oriented To Time, Place, And Person] : oriented to person, place, and time [de-identified] : trach in place

## 2022-04-20 NOTE — REVIEW OF SYSTEMS
[Nausea: Grade 1 - Loss of appetite without alteration in eating habits] : Nausea: Grade 1 - Loss of appetite without alteration in eating habits [Dizziness: Grade 1 - Mild unsteadiness or sensation of movement] : Dizziness: Grade 1 - Mild unsteadiness or sensation of movement [Headache: Grade 1 - Mild pain] : Headache: Grade 1 - Mild pain

## 2022-04-21 ENCOUNTER — NON-APPOINTMENT (OUTPATIENT)
Age: 67
End: 2022-04-21

## 2022-04-21 PROCEDURE — G6002: CPT | Mod: 26

## 2022-04-22 ENCOUNTER — NON-APPOINTMENT (OUTPATIENT)
Age: 67
End: 2022-04-22

## 2022-04-22 PROCEDURE — 77014: CPT | Mod: 26

## 2022-04-22 PROCEDURE — 77427 RADIATION TX MANAGEMENT X5: CPT

## 2022-04-23 LAB
CULTURE RESULTS: SIGNIFICANT CHANGE UP
SPECIMEN SOURCE: SIGNIFICANT CHANGE UP

## 2022-04-25 ENCOUNTER — RESULT REVIEW (OUTPATIENT)
Age: 67
End: 2022-04-25

## 2022-04-25 ENCOUNTER — APPOINTMENT (OUTPATIENT)
Dept: HEMATOLOGY ONCOLOGY | Facility: CLINIC | Age: 67
End: 2022-04-25

## 2022-04-25 LAB
BASOPHILS # BLD AUTO: 0 K/UL — SIGNIFICANT CHANGE UP (ref 0–0.2)
BASOPHILS NFR BLD AUTO: 0 % — SIGNIFICANT CHANGE UP (ref 0–2)
EOSINOPHIL # BLD AUTO: 3.05 K/UL — HIGH (ref 0–0.5)
EOSINOPHIL NFR BLD AUTO: 30 % — HIGH (ref 0–6)
HCT VFR BLD CALC: 40.7 % — SIGNIFICANT CHANGE UP (ref 39–50)
HGB BLD-MCNC: 13.6 G/DL — SIGNIFICANT CHANGE UP (ref 13–17)
LYMPHOCYTES # BLD AUTO: 2.24 K/UL — SIGNIFICANT CHANGE UP (ref 1–3.3)
LYMPHOCYTES # BLD AUTO: 22 % — SIGNIFICANT CHANGE UP (ref 13–44)
MCHC RBC-ENTMCNC: 30.5 PG — SIGNIFICANT CHANGE UP (ref 27–34)
MCHC RBC-ENTMCNC: 33.4 G/DL — SIGNIFICANT CHANGE UP (ref 32–36)
MCV RBC AUTO: 91.3 FL — SIGNIFICANT CHANGE UP (ref 80–100)
MONOCYTES # BLD AUTO: 0.51 K/UL — SIGNIFICANT CHANGE UP (ref 0–0.9)
MONOCYTES NFR BLD AUTO: 5 % — SIGNIFICANT CHANGE UP (ref 2–14)
NEUTROPHILS # BLD AUTO: 4.38 K/UL — SIGNIFICANT CHANGE UP (ref 1.8–7.4)
NEUTROPHILS NFR BLD AUTO: 43 % — SIGNIFICANT CHANGE UP (ref 43–77)
NRBC # BLD: 0 /100 — SIGNIFICANT CHANGE UP (ref 0–0)
NRBC # BLD: SIGNIFICANT CHANGE UP /100 WBCS (ref 0–0)
PLAT MORPH BLD: NORMAL — SIGNIFICANT CHANGE UP
PLATELET # BLD AUTO: 169 K/UL — SIGNIFICANT CHANGE UP (ref 150–400)
RBC # BLD: 4.46 M/UL — SIGNIFICANT CHANGE UP (ref 4.2–5.8)
RBC # FLD: 14.4 % — SIGNIFICANT CHANGE UP (ref 10.3–14.5)
RBC BLD AUTO: SIGNIFICANT CHANGE UP
WBC # BLD: 10.18 K/UL — SIGNIFICANT CHANGE UP (ref 3.8–10.5)
WBC # FLD AUTO: 10.18 K/UL — SIGNIFICANT CHANGE UP (ref 3.8–10.5)

## 2022-04-25 PROCEDURE — G6002: CPT | Mod: 26

## 2022-04-26 ENCOUNTER — NON-APPOINTMENT (OUTPATIENT)
Age: 67
End: 2022-04-26

## 2022-04-26 VITALS
WEIGHT: 143.52 LBS | HEIGHT: 67 IN | SYSTOLIC BLOOD PRESSURE: 111 MMHG | TEMPERATURE: 98.06 F | DIASTOLIC BLOOD PRESSURE: 76 MMHG | HEART RATE: 87 BPM | OXYGEN SATURATION: 97 % | RESPIRATION RATE: 18 BRPM | BODY MASS INDEX: 22.53 KG/M2

## 2022-04-26 PROCEDURE — G6002: CPT | Mod: 26

## 2022-04-26 NOTE — VITALS
[Maximal Pain Intensity: 4/10] : 4/10 [Least Pain Intensity: 0/10] : 0/10 [Pain Location: ___] : Pain Location: [unfilled] [80: Normal activity with effort; some signs or symptoms of disease.] : 80: Normal activity with effort; some signs or symptoms of disease.  [ECOG Performance Status: 1 - Restricted in physically strenuous activity but ambulatory and able to carry out work of a light or sedentary nature] : Performance Status: 1 - Restricted in physically strenuous activity but ambulatory and able to carry out work of a light or sedentary nature, e.g., light house work, office work [Date: ____________] : Patient's last distress assessment performed on [unfilled]. [7 - Distress Level] : Distress Level: 7 [Referred Patient  to social work for follow-up] : Patient was referred to social work for follow-up

## 2022-04-27 ENCOUNTER — APPOINTMENT (OUTPATIENT)
Dept: NEUROLOGY | Facility: CLINIC | Age: 67
End: 2022-04-27
Payer: MEDICARE

## 2022-04-27 PROCEDURE — G6002: CPT | Mod: 26

## 2022-04-27 PROCEDURE — 99215 OFFICE O/P EST HI 40 MIN: CPT | Mod: 25

## 2022-04-27 NOTE — HISTORY OF PRESENT ILLNESS
[FreeTextEntry1] : 68 yo RH man with PMHx HTN, DM, glioblastoma (MGMT methylated), presents for ongoing neuro-oncological care midway through 6 week course of chemoRT.\par \par ONCOLOGIC HISTORY\par 1/7/2022 - [place] to [facility] [place] [facility] [place] with headaches and cognitive changes and was diagnosed with brain tumor\par 1/24/2022 - presented to [facility] ED with complaints of confused speech (only states name to all questions asked)\par 1/27/2022 - s/p resection of LEFT frontal enhancing tumor with GLEOLAN placed.\par PATH: Gliosarcoma, no ATRX mutation on immunostains.\par 2/4/2022 - COVID testing NEGATIVE\par 2/8/2022 - d/c from [facility] to [facility] on salt tablets, insulin, and decadron \par 2/9/2022 - COVID POSITIVE, but thought to be FALSE positive as others negative\par 2/10/2022 - staples removed.\par 2/24/2022 - saw ENT for hoarse voice ( Dr. Graham Bergeron [phone])\par 2/28/2022 - returned to [facility] ED from rehab with complaints of difficulty swallowing and vocal cord injury. notes indicate, "Had NGtube during his stay that removed before he was discharged. Since the removal he has been experiencing throat pain and upper chest pain. Seen by ENT outpt and was noted to have vocal cord swelling and or irritation and sent in for CT. Having difficulty swallowing. 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 \par \par He is generally feeling well without headaches, but he continues to have episodes of emesis without much nausea. He is constipated. He was instructed by Dr Guevara to use zofran every 8 hours. He had an episode of emesis without nausea on his way to the appointment today. He has had very hard bowel movements. His diet lacks fiber, partially because of his diabetes (no fruit). He used miralex yesterday and had only a small, hard BM. Eating and drinking well. Belly is not distended. Gait is steady. Speech is clear. Daughter reported mild short-term memory impairment in the past.\par \par He remains with tracheostomy and breathing is better, although he has a persistent cough. He has followed up with CT surgery.

## 2022-04-27 NOTE — PHYSICAL EXAM
[FreeTextEntry1] : belly is soft, flat, and mildly tender in the left lower quadrant without masses.

## 2022-04-27 NOTE — HISTORY OF PRESENT ILLNESS
[FreeTextEntry1] : Mr. 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 [place] and 1/24 presented to [facility] with c/o confused speech and noted to responding by answering his name to all questions.\par \par 1/24/2022 CT Brain noted extensive lesion and surrounding vasogenic edema in left frontal and temporal lobes with involvement of the corpus callosum and medial left frontal lobe with marked ventricular effacement and 1.4 cm left to right midline shift.\par \par 1/25/2022 MRI Brain noted evidence of mass lesion in the left frontal lobe with significant mass effect on the left lateral ventricle and midline shift. Surrounding vasogenic edema. Subfalcine herniation. Suspicion of possible invasion of the corpus callosum. \par \par 1/27/2022 - he underwent left frontal craniotomy and resection of left frontal enhancing tumor and Gleolan placement by / Candy. Pathology revealed Gliosarcoma,  IDH-1 negative.\par \par 2/28/2022 - He returned to [facility] ED with complaints of difficulty swallowing and vocal cord injury. and 3/2/2022 - he underwent tracheal stenosis, bronchial lavage and trachea biopsy, negative for malignancy.\par \par He followed up with Dr. Gupta and Dr. Hill with discussion of starting adjuvant therapy, RT and possible Optune placement.\par \par 3/31/2022- Presents today to discuss treatment. Recovering well from his surgery. Notes generalized weakness, fatigue, blurred vision, and occasional neuropathy in feet since surgery. Tracheostomy appears clean and dry; he reports cough, mild 4/10 throat pain, tolerating soft diet. Having occasional HA (worse in evening) that resolves with Tylenol. Currently working with PT. When asked about emotional distress, pt appeared to struggle with disclosure. Daughter notes he recently started on Escitalopram 5 mg.\par \par 4/26//2022- Mr. Norwood presents today for on treatment visit. Has completed 1400/6000 cgy of radiation. dealt with nausea last week. Nausea slightly improved from last week, however still with vomiting today. no steroids at this time. denies headaches. slight fatigue and dizziness.

## 2022-04-27 NOTE — REVIEW OF SYSTEMS
[Blurred Vision: Grade 1 - Intervention not indicated] : Blurred Vision: Grade 1 - Intervention not indicated [Headache: Grade 1 - Mild pain] : Headache: Grade 1 - Mild pain [Peripheral Sensory Neuropathy: Grade 1 - Asymptomatic; loss of deep tendon reflexes or paresthesia] : Peripheral Sensory Neuropathy: Grade 1 - Asymptomatic; loss of deep tendon reflexes or paresthesia [Cough: Grade 1 - Mild symptoms; nonprescription intervention indicated] : Cough: Grade 1 - Mild symptoms; nonprescription intervention indicated [Abdominal Pain] : abdominal pain [Depression] : depression [Negative] : Allergic/Immunologic [Fatigue] : no fatigue [Visual Disturbances] : no visual disturbances [Dysphagia] : no dysphagia [FreeTextEntry7] : nausea this week

## 2022-04-27 NOTE — PHYSICAL EXAM
[Normal] : oriented to person, place and time, the affect was normal, the mood was normal and not anxious [de-identified] : trach present

## 2022-04-27 NOTE — DISCUSSION/SUMMARY
[FreeTextEntry1] : EMESIS -- The absence of persistent nausea suggests this is related to constipation, which can be exacerbated by ondansetron usage. I recommend taking this medication if nausea arises.\par \par CONSTIPATION -- We reviewed the addition of fiber to his diet and the use of Miralex. They will add stephen seeds to his smoothies. They understand the importance of regular BMs. I do not think he has a diabetic gastroparesis at this time.\par \par COUGH -- His cardiothoracic surgery team advised him to discuss with us which medications would NOT interfere with his cough. Importantly, he is afebrile and has not had a fever at home. I referred him to pulmonology (Dr Elias, if available) for management. The chemotherapy he will be taking (temozolomide) does not interact with any antibiotics or cough medications.\par \par SEIZURES -- He is off VImpat 100-100. He has never had a seizure, so I do not think he still needs to take this medication.\par \par BRAIN TUMOR - MRI to be performed upon completion of chemoRT. Given the mMGMT status, I recommend 12 cycles of post-RT Temozolomide. The patient and caregiver expressed a clear understanding of the issues at hand. All questions were answered. \par \par DISPO – To follow-up for post chemoradiation visit with brain MRI with contrast. \par

## 2022-04-27 NOTE — REASON FOR VISIT
[Consideration of Curative Therapy] : consideration of curative therapy for [Brain Tumor] : brain tumor [Spouse] : spouse [Family Member] : family member [[employer] ] : provided by [employer]   [Interpreters_IDNumber] : [id number] [Interpreters_FullName] : Meaghan [[id number]] : Mauritanian

## 2022-04-28 ENCOUNTER — OUTPATIENT (OUTPATIENT)
Dept: OUTPATIENT SERVICES | Facility: HOSPITAL | Age: 67
LOS: 1 days | Discharge: HOME | End: 2022-04-28

## 2022-04-28 ENCOUNTER — APPOINTMENT (OUTPATIENT)
Dept: RADIOLOGY | Facility: IMAGING CENTER | Age: 67
End: 2022-04-28
Payer: MEDICARE

## 2022-04-28 ENCOUNTER — OUTPATIENT (OUTPATIENT)
Dept: OUTPATIENT SERVICES | Facility: HOSPITAL | Age: 67
LOS: 1 days | End: 2022-04-28
Payer: MEDICARE

## 2022-04-28 DIAGNOSIS — Z98.890 OTHER SPECIFIED POSTPROCEDURAL STATES: Chronic | ICD-10-CM

## 2022-04-28 DIAGNOSIS — R10.9 UNSPECIFIED ABDOMINAL PAIN: ICD-10-CM

## 2022-04-28 DIAGNOSIS — C71.9 MALIGNANT NEOPLASM OF BRAIN, UNSPECIFIED: ICD-10-CM

## 2022-04-28 PROCEDURE — 74019 RADEX ABDOMEN 2 VIEWS: CPT

## 2022-04-28 PROCEDURE — G6002: CPT | Mod: 26

## 2022-04-28 PROCEDURE — 77427 RADIATION TX MANAGEMENT X5: CPT

## 2022-04-28 PROCEDURE — 74019 RADEX ABDOMEN 2 VIEWS: CPT | Mod: 26

## 2022-04-29 PROCEDURE — 77014: CPT | Mod: 26

## 2022-05-02 ENCOUNTER — RESULT REVIEW (OUTPATIENT)
Age: 67
End: 2022-05-02

## 2022-05-02 ENCOUNTER — APPOINTMENT (OUTPATIENT)
Dept: HEMATOLOGY ONCOLOGY | Facility: CLINIC | Age: 67
End: 2022-05-02

## 2022-05-02 LAB
BASOPHILS # BLD AUTO: 0.05 K/UL — SIGNIFICANT CHANGE UP (ref 0–0.2)
BASOPHILS NFR BLD AUTO: 0.5 % — SIGNIFICANT CHANGE UP (ref 0–2)
EOSINOPHIL # BLD AUTO: 1.83 K/UL — HIGH (ref 0–0.5)
EOSINOPHIL NFR BLD AUTO: 19.5 % — HIGH (ref 0–6)
HCT VFR BLD CALC: 41 % — SIGNIFICANT CHANGE UP (ref 39–50)
HGB BLD-MCNC: 13.9 G/DL — SIGNIFICANT CHANGE UP (ref 13–17)
IMM GRANULOCYTES NFR BLD AUTO: 0.4 % — SIGNIFICANT CHANGE UP (ref 0–1.5)
LYMPHOCYTES # BLD AUTO: 1.81 K/UL — SIGNIFICANT CHANGE UP (ref 1–3.3)
LYMPHOCYTES # BLD AUTO: 19.3 % — SIGNIFICANT CHANGE UP (ref 13–44)
MCHC RBC-ENTMCNC: 30.5 PG — SIGNIFICANT CHANGE UP (ref 27–34)
MCHC RBC-ENTMCNC: 33.9 G/DL — SIGNIFICANT CHANGE UP (ref 32–36)
MCV RBC AUTO: 90.1 FL — SIGNIFICANT CHANGE UP (ref 80–100)
MONOCYTES # BLD AUTO: 0.77 K/UL — SIGNIFICANT CHANGE UP (ref 0–0.9)
MONOCYTES NFR BLD AUTO: 8.2 % — SIGNIFICANT CHANGE UP (ref 2–14)
NEUTROPHILS # BLD AUTO: 4.89 K/UL — SIGNIFICANT CHANGE UP (ref 1.8–7.4)
NEUTROPHILS NFR BLD AUTO: 52.1 % — SIGNIFICANT CHANGE UP (ref 43–77)
NRBC # BLD: 0 /100 WBCS — SIGNIFICANT CHANGE UP (ref 0–0)
PLATELET # BLD AUTO: 201 K/UL — SIGNIFICANT CHANGE UP (ref 150–400)
RBC # BLD: 4.55 M/UL — SIGNIFICANT CHANGE UP (ref 4.2–5.8)
RBC # FLD: 14.1 % — SIGNIFICANT CHANGE UP (ref 10.3–14.5)
WBC # BLD: 9.39 K/UL — SIGNIFICANT CHANGE UP (ref 3.8–10.5)
WBC # FLD AUTO: 9.39 K/UL — SIGNIFICANT CHANGE UP (ref 3.8–10.5)

## 2022-05-02 PROCEDURE — G6002: CPT | Mod: 26

## 2022-05-03 ENCOUNTER — NON-APPOINTMENT (OUTPATIENT)
Age: 67
End: 2022-05-03

## 2022-05-03 VITALS
DIASTOLIC BLOOD PRESSURE: 72 MMHG | SYSTOLIC BLOOD PRESSURE: 110 MMHG | HEIGHT: 67 IN | OXYGEN SATURATION: 97 % | RESPIRATION RATE: 16 BRPM | WEIGHT: 145 LBS | BODY MASS INDEX: 22.76 KG/M2 | HEART RATE: 97 BPM

## 2022-05-03 PROCEDURE — G6002: CPT | Mod: 26

## 2022-05-03 NOTE — PHYSICAL EXAM
[Normal] : oriented to person, place and time, the affect was normal, the mood was normal and not anxious [de-identified] : trach present

## 2022-05-03 NOTE — DISEASE MANAGEMENT
[Clinical] : TNM Stage: c [N/A] : Currently not applicable [TTNM] : x [NTNM] : x [MTNM] : x [de-identified] : 2400 cgy [de-identified] : 6000 cgy [de-identified] : left partial brain

## 2022-05-03 NOTE — HISTORY OF PRESENT ILLNESS
[FreeTextEntry1] : Mr. 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 [place] and 1/24 presented to [facility] with c/o confused speech and noted to responding by answering his name to all questions.\par \par 1/24/2022 CT Brain noted extensive lesion and surrounding vasogenic edema in left frontal and temporal lobes with involvement of the corpus callosum and medial left frontal lobe with marked ventricular effacement and 1.4 cm left to right midline shift.\par \par 1/25/2022 MRI Brain noted evidence of mass lesion in the left frontal lobe with significant mass effect on the left lateral ventricle and midline shift. Surrounding vasogenic edema. Subfalcine herniation. Suspicion of possible invasion of the corpus callosum. \par \par 1/27/2022 - he underwent left frontal craniotomy and resection of left frontal enhancing tumor and Gleolan placement by / Candy. Pathology revealed Gliosarcoma,  IDH-1 negative.\par \par 2/28/2022 - He returned to [facility] ED with complaints of difficulty swallowing and vocal cord injury. and 3/2/2022 - he underwent tracheal stenosis, bronchial lavage and trachea biopsy, negative for malignancy.\par \par He followed up with Dr. Gupta and Dr. Hill with discussion of starting adjuvant therapy, RT and possible Optune placement.\par \par 3/31/2022- Presents today to discuss treatment. Recovering well from his surgery. Notes generalized weakness, fatigue, blurred vision, and occasional neuropathy in feet since surgery. Tracheostomy appears clean and dry; he reports cough, mild 4/10 throat pain, tolerating soft diet. Having occasional HA (worse in evening) that resolves with Tylenol. Currently working with PT. When asked about emotional distress, pt appeared to struggle with disclosure. Daughter notes he recently started on Escitalopram 5 mg.\par \par 4/26//2022- Mr. Norwood presents today for on treatment visit. Has completed 1400/6000 cgy of radiation. dealt with nausea last week. Nausea slightly improved from last week, however still with vomiting today. no steroids at this time. denies headaches. slight fatigue and dizziness.\par \par 5/3/2022- Mr. Norwood presents today for on treatment visit. He has completed 2400/6000 cgy of radiation. KUB revealed a kidney stone. Today he feels well. notes abdominal pressure and nausea have improved. now moving bowels. feels some pressure in his chest hen laying down at night. never during the day and no shortness of breath.some cough at night. no steroids.

## 2022-05-03 NOTE — REASON FOR VISIT
[Routine On-Treatment] : a routine on-treatment visit for [Brain Tumor] : brain tumor [Spouse] : spouse [Family Member] : family member [[employer] ] : provided by [employer]   [Interpreters_IDNumber] : [id number] [Interpreters_FullName] : Meaghan [[id number]] : Chinese

## 2022-05-04 ENCOUNTER — NON-APPOINTMENT (OUTPATIENT)
Age: 67
End: 2022-05-04

## 2022-05-04 PROCEDURE — G6002: CPT | Mod: 26

## 2022-05-05 PROCEDURE — G6002: CPT | Mod: 26

## 2022-05-06 ENCOUNTER — NON-APPOINTMENT (OUTPATIENT)
Age: 67
End: 2022-05-06

## 2022-05-06 ENCOUNTER — APPOINTMENT (OUTPATIENT)
Dept: OPHTHALMOLOGY | Facility: CLINIC | Age: 67
End: 2022-05-06
Payer: MEDICARE

## 2022-05-06 PROCEDURE — 92004 COMPRE OPH EXAM NEW PT 1/>: CPT

## 2022-05-06 PROCEDURE — 77427 RADIATION TX MANAGEMENT X5: CPT

## 2022-05-06 PROCEDURE — 77014: CPT | Mod: 26

## 2022-05-06 PROCEDURE — 92015 DETERMINE REFRACTIVE STATE: CPT

## 2022-05-09 ENCOUNTER — APPOINTMENT (OUTPATIENT)
Dept: HEMATOLOGY ONCOLOGY | Facility: CLINIC | Age: 67
End: 2022-05-09

## 2022-05-09 ENCOUNTER — RESULT REVIEW (OUTPATIENT)
Age: 67
End: 2022-05-09

## 2022-05-09 LAB
BASOPHILS # BLD AUTO: 0.08 K/UL — SIGNIFICANT CHANGE UP (ref 0–0.2)
BASOPHILS NFR BLD AUTO: 0.8 % — SIGNIFICANT CHANGE UP (ref 0–2)
EOSINOPHIL # BLD AUTO: 2.55 K/UL — HIGH (ref 0–0.5)
EOSINOPHIL NFR BLD AUTO: 24.2 % — HIGH (ref 0–6)
HCT VFR BLD CALC: 40.2 % — SIGNIFICANT CHANGE UP (ref 39–50)
HGB BLD-MCNC: 13.5 G/DL — SIGNIFICANT CHANGE UP (ref 13–17)
IMM GRANULOCYTES NFR BLD AUTO: 0.3 % — SIGNIFICANT CHANGE UP (ref 0–1.5)
LYMPHOCYTES # BLD AUTO: 1.35 K/UL — SIGNIFICANT CHANGE UP (ref 1–3.3)
LYMPHOCYTES # BLD AUTO: 12.8 % — LOW (ref 13–44)
MCHC RBC-ENTMCNC: 30.2 PG — SIGNIFICANT CHANGE UP (ref 27–34)
MCHC RBC-ENTMCNC: 33.6 G/DL — SIGNIFICANT CHANGE UP (ref 32–36)
MCV RBC AUTO: 89.9 FL — SIGNIFICANT CHANGE UP (ref 80–100)
MONOCYTES # BLD AUTO: 0.63 K/UL — SIGNIFICANT CHANGE UP (ref 0–0.9)
MONOCYTES NFR BLD AUTO: 6 % — SIGNIFICANT CHANGE UP (ref 2–14)
NEUTROPHILS # BLD AUTO: 5.89 K/UL — SIGNIFICANT CHANGE UP (ref 1.8–7.4)
NEUTROPHILS NFR BLD AUTO: 55.9 % — SIGNIFICANT CHANGE UP (ref 43–77)
NRBC # BLD: 0 /100 WBCS — SIGNIFICANT CHANGE UP (ref 0–0)
PLATELET # BLD AUTO: 209 K/UL — SIGNIFICANT CHANGE UP (ref 150–400)
RBC # BLD: 4.47 M/UL — SIGNIFICANT CHANGE UP (ref 4.2–5.8)
RBC # FLD: 14 % — SIGNIFICANT CHANGE UP (ref 10.3–14.5)
WBC # BLD: 10.53 K/UL — HIGH (ref 3.8–10.5)
WBC # FLD AUTO: 10.53 K/UL — HIGH (ref 3.8–10.5)

## 2022-05-09 PROCEDURE — G6002: CPT | Mod: 26

## 2022-05-10 ENCOUNTER — APPOINTMENT (OUTPATIENT)
Dept: CT IMAGING | Facility: IMAGING CENTER | Age: 67
End: 2022-05-10
Payer: MEDICARE

## 2022-05-10 ENCOUNTER — NON-APPOINTMENT (OUTPATIENT)
Age: 67
End: 2022-05-10

## 2022-05-10 ENCOUNTER — APPOINTMENT (OUTPATIENT)
Dept: UROLOGY | Facility: CLINIC | Age: 67
End: 2022-05-10

## 2022-05-10 ENCOUNTER — APPOINTMENT (OUTPATIENT)
Dept: CARDIOLOGY | Facility: CLINIC | Age: 67
End: 2022-05-10
Payer: MEDICARE

## 2022-05-10 ENCOUNTER — OUTPATIENT (OUTPATIENT)
Dept: OUTPATIENT SERVICES | Facility: HOSPITAL | Age: 67
LOS: 1 days | End: 2022-05-10
Payer: MEDICARE

## 2022-05-10 ENCOUNTER — APPOINTMENT (OUTPATIENT)
Dept: RADIOLOGY | Facility: IMAGING CENTER | Age: 67
End: 2022-05-10
Payer: MEDICARE

## 2022-05-10 ENCOUNTER — LABORATORY RESULT (OUTPATIENT)
Age: 67
End: 2022-05-10

## 2022-05-10 ENCOUNTER — RESULT REVIEW (OUTPATIENT)
Age: 67
End: 2022-05-10

## 2022-05-10 VITALS
RESPIRATION RATE: 18 BRPM | SYSTOLIC BLOOD PRESSURE: 89 MMHG | HEART RATE: 123 BPM | DIASTOLIC BLOOD PRESSURE: 64 MMHG | OXYGEN SATURATION: 98 % | TEMPERATURE: 98.6 F

## 2022-05-10 VITALS
HEIGHT: 67 IN | BODY MASS INDEX: 21.03 KG/M2 | HEART RATE: 112 BPM | WEIGHT: 134 LBS | TEMPERATURE: 208.58 F | DIASTOLIC BLOOD PRESSURE: 76 MMHG | SYSTOLIC BLOOD PRESSURE: 108 MMHG | OXYGEN SATURATION: 96 %

## 2022-05-10 DIAGNOSIS — R00.0 TACHYCARDIA, UNSPECIFIED: ICD-10-CM

## 2022-05-10 DIAGNOSIS — E61.1 IRON DEFICIENCY: ICD-10-CM

## 2022-05-10 DIAGNOSIS — J39.8 OTHER SPECIFIED DISEASES OF UPPER RESPIRATORY TRACT: ICD-10-CM

## 2022-05-10 DIAGNOSIS — Z98.890 OTHER SPECIFIED POSTPROCEDURAL STATES: Chronic | ICD-10-CM

## 2022-05-10 PROCEDURE — 93000 ELECTROCARDIOGRAM COMPLETE: CPT

## 2022-05-10 PROCEDURE — 74176 CT ABD & PELVIS W/O CONTRAST: CPT | Mod: 26

## 2022-05-10 PROCEDURE — 71046 X-RAY EXAM CHEST 2 VIEWS: CPT | Mod: 26

## 2022-05-10 PROCEDURE — 71046 X-RAY EXAM CHEST 2 VIEWS: CPT

## 2022-05-10 PROCEDURE — 74176 CT ABD & PELVIS W/O CONTRAST: CPT

## 2022-05-10 PROCEDURE — 99214 OFFICE O/P EST MOD 30 MIN: CPT | Mod: 25

## 2022-05-10 NOTE — REVIEW OF SYSTEMS
[Constipation: Grade 1 - Occasional or intermittent symptoms; occasional use of stool softeners, laxatives, dietary modification, or enema] : Constipation: Grade 1 - Occasional or intermittent symptoms; occasional use of stool softeners, laxatives, dietary modification, or enema [Nausea: Grade 1 - Loss of appetite without alteration in eating habits] : Nausea: Grade 1 - Loss of appetite without alteration in eating habits [Vomiting: Grade 2 - 3 - 5 episodes ( by 5 minutes) in 24 hrs] : Vomiting: Grade 2 - 3 - 5 episodes ( by 5 minutes) in 24 hrs [Fatigue: Grade 1 - Fatigue relieved by rest] : Fatigue: Grade 1 - Fatigue relieved by rest [Dyspnea: Grade 1 - Shortness of breath with moderate exertion] : Dyspnea: Grade 1 - Shortness of breath with moderate exertion [Fatigue] : no fatigue [Visual Disturbances] : no visual disturbances [Dysphagia] : no dysphagia [FreeTextEntry7] : nausea this week

## 2022-05-10 NOTE — HISTORY OF PRESENT ILLNESS
[FreeTextEntry1] : This is a 66yo male with PMH of DM2 and recent diagnosis of Gliosarcoma s/p resection of the left frontal enhancing tumor and Gleolan placed. He was also hospitalized for tracheal stenosis and s/p Tracheal balloon dilatation on 3/2/22 and Tracheostomy on 3/17/22. He is accompanied by his daughter at today's visit. He is s/p ECHO and Carotid Doppler as advised. He reports he has abdominal pain with nausea and vomiting for approx 1 week. He has been taking Zofran as needed. He is also taking omeprazole. He reports that despite this medication he is still experiencing lack of appetite, nausea and vomiting. His daughter reports that he had an abdominal scan on Friday and was found to have kidney stones. He continues to undergoing chemo/radiation treatment daily.

## 2022-05-10 NOTE — DISEASE MANAGEMENT
[TTNM] : x [NTNM] : x [MTNM] : x [de-identified] : 3200 cgy [de-identified] : 6000 cgy [de-identified] : left partial brain

## 2022-05-10 NOTE — HISTORY OF PRESENT ILLNESS
[FreeTextEntry1] : Mr. 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 [place] and 1/24 presented to [facility] with c/o confused speech and noted to responding by answering his name to all questions.\par \par 1/24/2022 CT Brain noted extensive lesion and surrounding vasogenic edema in left frontal and temporal lobes with involvement of the corpus callosum and medial left frontal lobe with marked ventricular effacement and 1.4 cm left to right midline shift.\par \par 1/25/2022 MRI Brain noted evidence of mass lesion in the left frontal lobe with significant mass effect on the left lateral ventricle and midline shift. Surrounding vasogenic edema. Subfalcine herniation. Suspicion of possible invasion of the corpus callosum. \par \par 1/27/2022 - he underwent left frontal craniotomy and resection of left frontal enhancing tumor and Gleolan placement by / Candy. Pathology revealed Gliosarcoma,  IDH-1 negative.\par \par 2/28/2022 - He returned to [facility] ED with complaints of difficulty swallowing and vocal cord injury. and 3/2/2022 - he underwent tracheal stenosis, bronchial lavage and trachea biopsy, negative for malignancy.\par \par He followed up with Dr. Gupta and Dr. Hill with discussion of starting adjuvant therapy, RT and possible Optune placement.\par \par 3/31/2022- Presents today to discuss treatment. Recovering well from his surgery. Notes generalized weakness, fatigue, blurred vision, and occasional neuropathy in feet since surgery. Tracheostomy appears clean and dry; he reports cough, mild 4/10 throat pain, tolerating soft diet. Having occasional HA (worse in evening) that resolves with Tylenol. Currently working with PT. When asked about emotional distress, pt appeared to struggle with disclosure. Daughter notes he recently started on Escitalopram 5 mg.\par \par 4/26//2022- Mr. Norwood presents today for on treatment visit. Has completed 1400/6000 cgy of radiation. dealt with nausea last week. Nausea slightly improved from last week, however still with vomiting today. no steroids at this time. denies headaches. slight fatigue and dizziness.\par \par 5/3/2022- Mr. Norwood presents today for on treatment visit. He has completed 2400/6000 cgy of radiation. KUB revealed a kidney stone. Today he feels well. notes abdominal pressure and nausea have improved. now moving bowels. feels some pressure in his chest hen laying down at night. never during the day and no shortness of breath.some cough at night. no steroids.\par \par 5/10/2022- Mr. Norwood presents today for on treatment visit. has completed 3200/6000 cgy of radiation. He is seen prior to treatment for vomiting episode. Family states they have increased frequency of Zofran from once daily to BID (resolution of symptoms x 1 week, but rebounded). Pt reports feeling lightheaded and unsteady on feet. Per family, he has not been tolerating oral intake and hydrating poorly. Remains on TMZ (last dose yesterday evening).

## 2022-05-11 VITALS — HEART RATE: 96 BPM | OXYGEN SATURATION: 98 % | SYSTOLIC BLOOD PRESSURE: 94 MMHG | DIASTOLIC BLOOD PRESSURE: 68 MMHG

## 2022-05-11 PROCEDURE — G6002: CPT | Mod: 26

## 2022-05-12 ENCOUNTER — APPOINTMENT (OUTPATIENT)
Dept: ENDOCRINOLOGY | Facility: CLINIC | Age: 67
End: 2022-05-12

## 2022-05-12 PROCEDURE — G6002: CPT | Mod: 26

## 2022-05-13 PROCEDURE — G6002: CPT | Mod: 26

## 2022-05-16 ENCOUNTER — RESULT REVIEW (OUTPATIENT)
Age: 67
End: 2022-05-16

## 2022-05-16 ENCOUNTER — APPOINTMENT (OUTPATIENT)
Dept: HEMATOLOGY ONCOLOGY | Facility: CLINIC | Age: 67
End: 2022-05-16

## 2022-05-16 LAB
BASOPHILS # BLD AUTO: 0.1 K/UL — SIGNIFICANT CHANGE UP (ref 0–0.2)
BASOPHILS NFR BLD AUTO: 1.2 % — SIGNIFICANT CHANGE UP (ref 0–2)
EOSINOPHIL # BLD AUTO: 2.93 K/UL — HIGH (ref 0–0.5)
EOSINOPHIL NFR BLD AUTO: 34.6 % — HIGH (ref 0–6)
HCT VFR BLD CALC: 40 % — SIGNIFICANT CHANGE UP (ref 39–50)
HGB BLD-MCNC: 13.5 G/DL — SIGNIFICANT CHANGE UP (ref 13–17)
IMM GRANULOCYTES NFR BLD AUTO: 0.2 % — SIGNIFICANT CHANGE UP (ref 0–1.5)
LYMPHOCYTES # BLD AUTO: 1.27 K/UL — SIGNIFICANT CHANGE UP (ref 1–3.3)
LYMPHOCYTES # BLD AUTO: 15 % — SIGNIFICANT CHANGE UP (ref 13–44)
MCHC RBC-ENTMCNC: 30 PG — SIGNIFICANT CHANGE UP (ref 27–34)
MCHC RBC-ENTMCNC: 33.8 G/DL — SIGNIFICANT CHANGE UP (ref 32–36)
MCV RBC AUTO: 88.9 FL — SIGNIFICANT CHANGE UP (ref 80–100)
MONOCYTES # BLD AUTO: 0.79 K/UL — SIGNIFICANT CHANGE UP (ref 0–0.9)
MONOCYTES NFR BLD AUTO: 9.3 % — SIGNIFICANT CHANGE UP (ref 2–14)
NEUTROPHILS # BLD AUTO: 3.37 K/UL — SIGNIFICANT CHANGE UP (ref 1.8–7.4)
NEUTROPHILS NFR BLD AUTO: 39.7 % — LOW (ref 43–77)
NRBC # BLD: 0 /100 WBCS — SIGNIFICANT CHANGE UP (ref 0–0)
PLATELET # BLD AUTO: 178 K/UL — SIGNIFICANT CHANGE UP (ref 150–400)
RBC # BLD: 4.5 M/UL — SIGNIFICANT CHANGE UP (ref 4.2–5.8)
RBC # FLD: 14.3 % — SIGNIFICANT CHANGE UP (ref 10.3–14.5)
WBC # BLD: 8.48 K/UL — SIGNIFICANT CHANGE UP (ref 3.8–10.5)
WBC # FLD AUTO: 8.48 K/UL — SIGNIFICANT CHANGE UP (ref 3.8–10.5)

## 2022-05-16 PROCEDURE — 77427 RADIATION TX MANAGEMENT X5: CPT

## 2022-05-16 PROCEDURE — G6002: CPT | Mod: 26

## 2022-05-17 ENCOUNTER — NON-APPOINTMENT (OUTPATIENT)
Age: 67
End: 2022-05-17

## 2022-05-17 VITALS
HEART RATE: 97 BPM | OXYGEN SATURATION: 96 % | TEMPERATURE: 97.34 F | WEIGHT: 139.13 LBS | HEIGHT: 67 IN | RESPIRATION RATE: 17 BRPM | DIASTOLIC BLOOD PRESSURE: 71 MMHG | SYSTOLIC BLOOD PRESSURE: 103 MMHG | BODY MASS INDEX: 21.84 KG/M2

## 2022-05-17 PROCEDURE — G6002: CPT | Mod: 26

## 2022-05-17 NOTE — VITALS
[Least Pain Intensity: 0/10] : 0/10 [Pain Location: ___] : Pain Location: [unfilled] [80: Normal activity with effort; some signs or symptoms of disease.] : 80: Normal activity with effort; some signs or symptoms of disease.  [ECOG Performance Status: 1 - Restricted in physically strenuous activity but ambulatory and able to carry out work of a light or sedentary nature] : Performance Status: 1 - Restricted in physically strenuous activity but ambulatory and able to carry out work of a light or sedentary nature, e.g., light house work, office work [Date: ____________] : Patient's last distress assessment performed on [unfilled]. [7 - Distress Level] : Distress Level: 7 [Referred Patient  to social work for follow-up] : Patient was referred to social work for follow-up [Maximal Pain Intensity: 1/10] : 1/10

## 2022-05-17 NOTE — DISEASE MANAGEMENT
[Clinical] : TNM Stage: c [N/A] : Currently not applicable [TTNM] : x [NTNM] : x [MTNM] : x [de-identified] : 3200 cgy [de-identified] : 6000 cgy [de-identified] : left partial brain

## 2022-05-17 NOTE — REVIEW OF SYSTEMS
[Constipation: Grade 1 - Occasional or intermittent symptoms; occasional use of stool softeners, laxatives, dietary modification, or enema] : Constipation: Grade 1 - Occasional or intermittent symptoms; occasional use of stool softeners, laxatives, dietary modification, or enema [Nausea: Grade 1 - Loss of appetite without alteration in eating habits] : Nausea: Grade 1 - Loss of appetite without alteration in eating habits [Vomiting: Grade 2 - 3 - 5 episodes ( by 5 minutes) in 24 hrs] : Vomiting: Grade 2 - 3 - 5 episodes ( by 5 minutes) in 24 hrs [Fatigue: Grade 1 - Fatigue relieved by rest] : Fatigue: Grade 1 - Fatigue relieved by rest [Blurred Vision: Grade 1 - Intervention not indicated] : Blurred Vision: Grade 1 - Intervention not indicated [Headache: Grade 1 - Mild pain] : Headache: Grade 1 - Mild pain [Peripheral Sensory Neuropathy: Grade 1 - Asymptomatic; loss of deep tendon reflexes or paresthesia] : Peripheral Sensory Neuropathy: Grade 1 - Asymptomatic; loss of deep tendon reflexes or paresthesia [Cough: Grade 1 - Mild symptoms; nonprescription intervention indicated] : Cough: Grade 1 - Mild symptoms; nonprescription intervention indicated [Dyspnea: Grade 1 - Shortness of breath with moderate exertion] : Dyspnea: Grade 1 - Shortness of breath with moderate exertion [Depression] : depression [Fatigue] : no fatigue [Visual Disturbances] : no visual disturbances [Dysphagia] : no dysphagia [Abdominal Pain] : no abdominal pain [Constipation] : constipation [Negative] : Gastrointestinal [de-identified] : mild headache, left side

## 2022-05-17 NOTE — PHYSICAL EXAM
[Normal] : oriented to person, place and time, the affect was normal, the mood was normal and not anxious [de-identified] : trach present

## 2022-05-17 NOTE — HISTORY OF PRESENT ILLNESS
[FreeTextEntry1] : Mr. 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 [place] and 1/24 presented to [facility] with c/o confused speech and noted to responding by answering his name to all questions.\par \par 1/24/2022 CT Brain noted extensive lesion and surrounding vasogenic edema in left frontal and temporal lobes with involvement of the corpus callosum and medial left frontal lobe with marked ventricular effacement and 1.4 cm left to right midline shift.\par \par 1/25/2022 MRI Brain noted evidence of mass lesion in the left frontal lobe with significant mass effect on the left lateral ventricle and midline shift. Surrounding vasogenic edema. Subfalcine herniation. Suspicion of possible invasion of the corpus callosum. \par \par 1/27/2022 - he underwent left frontal craniotomy and resection of left frontal enhancing tumor and Gleolan placement by / Candy. Pathology revealed Gliosarcoma,  IDH-1 negative.\par \par 2/28/2022 - He returned to [facility] ED with complaints of difficulty swallowing and vocal cord injury. and 3/2/2022 - he underwent tracheal stenosis, bronchial lavage and trachea biopsy, negative for malignancy.\par \par He followed up with Dr. Gupta and Dr. Hill with discussion of starting adjuvant therapy, RT and possible Optune placement.\par \par 3/31/2022- Presents today to discuss treatment. Recovering well from his surgery. Notes generalized weakness, fatigue, blurred vision, and occasional neuropathy in feet since surgery. Tracheostomy appears clean and dry; he reports cough, mild 4/10 throat pain, tolerating soft diet. Having occasional HA (worse in evening) that resolves with Tylenol. Currently working with PT. When asked about emotional distress, pt appeared to struggle with disclosure. Daughter notes he recently started on Escitalopram 5 mg.\par \par 4/26//2022- Mr. Norwood presents today for on treatment visit. Has completed 1400/6000 cgy of radiation. dealt with nausea last week. Nausea slightly improved from last week, however still with vomiting today. no steroids at this time. denies headaches. slight fatigue and dizziness.\par \par 5/3/2022- Mr. Norwood presents today for on treatment visit. He has completed 2400/6000 cgy of radiation. KUB revealed a kidney stone. Today he feels well. notes abdominal pressure and nausea have improved. now moving bowels. feels some pressure in his chest hen laying down at night. never during the day and no shortness of breath.some cough at night. no steroids.\par \par 5/10/2022- Mr. Norwood presents today for on treatment visit. has completed 3200/6000 cgy of radiation. He is seen prior to treatment for vomiting episode. Family states they have increased frequency of Zofran from once daily to BID (resolution of symptoms x 1 week, but rebounded). Pt reports feeling lightheaded and unsteady on feet. Per family, he has not been tolerating oral intake and hydrating poorly. Remains on TMZ (last dose yesterday evening).\par \par 5/17/2022- Mr. Norwood presents today for on treatment visit. He has completed 4200/6000 cgy of radiation to the left partial brain. feeling much better. hydrating well, continues on temodar. Notes he still has slight abdominal pain after taking temodar pills. no nausea or vomiting at this time. only mild right sided headache from time to time. mild constipation.

## 2022-05-17 NOTE — REASON FOR VISIT
Continue current management. [Routine On-Treatment] : a routine on-treatment visit for [Brain Tumor] : brain tumor [Spouse] : spouse [Family Member] : family member [[employer] ] : provided by [employer]   [Interpreters_IDNumber] : [id number] [Interpreters_FullName] : Meaghan [[id number]] : Guamanian

## 2022-05-18 PROCEDURE — G6002: CPT | Mod: 26

## 2022-05-19 PROCEDURE — G6002: CPT | Mod: 26

## 2022-05-19 NOTE — REVIEW OF SYSTEMS
[Nausea: Grade 1 - Loss of appetite without alteration in eating habits] : Nausea: Grade 1 - Loss of appetite without alteration in eating habits [Vomiting: Grade 1 - 1 - 2 episodes ( by 5 minutes) in 24 hrs] : Vomiting: Grade 1 - 1 - 2 episodes ( by 5 minutes) in 24 hrs [Fatigue: Grade 1 - Fatigue relieved by rest] : Fatigue: Grade 1 - Fatigue relieved by rest [Dizziness: Grade 1 - Mild unsteadiness or sensation of movement] : Dizziness: Grade 1 - Mild unsteadiness or sensation of movement

## 2022-05-20 ENCOUNTER — APPOINTMENT (OUTPATIENT)
Dept: RADIATION ONCOLOGY | Facility: CLINIC | Age: 67
End: 2022-05-20
Payer: MEDICARE

## 2022-05-20 ENCOUNTER — NON-APPOINTMENT (OUTPATIENT)
Age: 67
End: 2022-05-20

## 2022-05-20 VITALS
HEIGHT: 67 IN | TEMPERATURE: 97.34 F | DIASTOLIC BLOOD PRESSURE: 62 MMHG | OXYGEN SATURATION: 97 % | RESPIRATION RATE: 17 BRPM | SYSTOLIC BLOOD PRESSURE: 93 MMHG | HEART RATE: 94 BPM

## 2022-05-20 PROCEDURE — 77014: CPT | Mod: 26

## 2022-05-20 PROCEDURE — 99203 OFFICE O/P NEW LOW 30 MIN: CPT | Mod: 25,95

## 2022-05-20 NOTE — DISEASE MANAGEMENT
[Clinical] : TNM Stage: c [FreeTextEntry4] : GBM [TTNM] : - [NTNM] : - [MTNM] : - [N/A] : Currently not applicable

## 2022-05-20 NOTE — HISTORY OF PRESENT ILLNESS
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 Guevara for certification of medical cannabis. \par \par In January 2022 he presented to the clinic while visiting the [place] with complaints of headaches and cognitive changes. He was diagnosed with a brain tumor. \par \par [place] then returned the the [place] and presented to [facility] on 1/24/22 with speech impairment and confusion. Inpatient CT brain noted an extensive lesion with surrounding vasogenic edema in the left frontal and temporal lobes with involvement of the corpus callosum and medial left frontal lobe with marked ventricular effacement and a 1.4 cm left to right midline shift. \par \par Inpatient brain MRI on 1/25/22 showed evidence of a mass lesion in the left frontal lobe with significant mass effect on the left lateral ventricle and midline shift with surrounding vasogenic edema and subfalcine herniation. Suspicion of possible invasion of the corpus callosum noted. \par \par On 1/27/22 he underwent a left frontal craniotomy and resection of the left frontal enhancing tumor and Gleolan placement by Dr. Gupta. Pathology showed a gliosarcoma WHO grade 4. IDH-1 negative. \par \par He then presented to [facility] on 2/28/22 with complaints of dysphagia and vocal cord injury. On 3/2/22 he underwent tracheal stenosis, bronchial lavage and trachea biopsy which was negative for malignancy. Post-operative recovery was complicated by stridor. Tracheostomy on 3/17/22. \par \par He began RT to the left partial brain (planned total dose of 6000 cGy) with Dr. Guevara in April 2022. \par \par States he has nausea with vomiting and is taking Zofran. He had a resolution of symptoms with Zofran for 1 week but then rebounded. Abdominal pain noted. He states he has become weaker since starting radiation. Decreased appetite and poor oral intake noted. Weight loss of about 15 pounds noted.  Daughter states weight loss is closer to 30 lbs.

## 2022-05-23 ENCOUNTER — RESULT REVIEW (OUTPATIENT)
Age: 67
End: 2022-05-23

## 2022-05-23 ENCOUNTER — APPOINTMENT (OUTPATIENT)
Dept: HEMATOLOGY ONCOLOGY | Facility: CLINIC | Age: 67
End: 2022-05-23

## 2022-05-23 PROBLEM — Z86.73 HISTORY OF CEREBROVASCULAR ACCIDENT: Status: RESOLVED | Noted: 2022-05-23 | Resolved: 2022-05-23

## 2022-05-23 LAB
BASOPHILS # BLD AUTO: 0.09 K/UL — SIGNIFICANT CHANGE UP (ref 0–0.2)
BASOPHILS NFR BLD AUTO: 1 % — SIGNIFICANT CHANGE UP (ref 0–2)
EOSINOPHIL # BLD AUTO: 2.84 K/UL — HIGH (ref 0–0.5)
EOSINOPHIL NFR BLD AUTO: 30.1 % — HIGH (ref 0–6)
HCT VFR BLD CALC: 40 % — SIGNIFICANT CHANGE UP (ref 39–50)
HGB BLD-MCNC: 13.4 G/DL — SIGNIFICANT CHANGE UP (ref 13–17)
IMM GRANULOCYTES NFR BLD AUTO: 0.4 % — SIGNIFICANT CHANGE UP (ref 0–1.5)
LYMPHOCYTES # BLD AUTO: 1.32 K/UL — SIGNIFICANT CHANGE UP (ref 1–3.3)
LYMPHOCYTES # BLD AUTO: 14 % — SIGNIFICANT CHANGE UP (ref 13–44)
MCHC RBC-ENTMCNC: 30.3 PG — SIGNIFICANT CHANGE UP (ref 27–34)
MCHC RBC-ENTMCNC: 33.5 G/DL — SIGNIFICANT CHANGE UP (ref 32–36)
MCV RBC AUTO: 90.5 FL — SIGNIFICANT CHANGE UP (ref 80–100)
MONOCYTES # BLD AUTO: 0.77 K/UL — SIGNIFICANT CHANGE UP (ref 0–0.9)
MONOCYTES NFR BLD AUTO: 8.2 % — SIGNIFICANT CHANGE UP (ref 2–14)
NEUTROPHILS # BLD AUTO: 4.36 K/UL — SIGNIFICANT CHANGE UP (ref 1.8–7.4)
NEUTROPHILS NFR BLD AUTO: 46.3 % — SIGNIFICANT CHANGE UP (ref 43–77)
NRBC # BLD: 0 /100 WBCS — SIGNIFICANT CHANGE UP (ref 0–0)
PLATELET # BLD AUTO: 143 K/UL — LOW (ref 150–400)
RBC # BLD: 4.42 M/UL — SIGNIFICANT CHANGE UP (ref 4.2–5.8)
RBC # FLD: 14.5 % — SIGNIFICANT CHANGE UP (ref 10.3–14.5)
WBC # BLD: 9.42 K/UL — SIGNIFICANT CHANGE UP (ref 3.8–10.5)
WBC # FLD AUTO: 9.42 K/UL — SIGNIFICANT CHANGE UP (ref 3.8–10.5)

## 2022-05-23 PROCEDURE — G6002: CPT | Mod: 26

## 2022-05-23 PROCEDURE — 77427 RADIATION TX MANAGEMENT X5: CPT

## 2022-05-23 NOTE — HISTORY OF PRESENT ILLNESS
[FreeTextEntry1] : Mr. JORDAN CHAKRABORTY is a 67 year old male who presents for initial endocrine evaluation with regard to a hx of type 2 dm. The diabetes has been present for about 1.5 years. 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 With regard to neuropathy,he denies any specific diabetic neuropathy type s/s.  . For the diabetes, he  is currently taking  Metformin 100 mg bidLantus insulin  per pmd at 5 units pre each meal of Humalog and Lantus 16 units  hs but of late he is  using a sliding scale under 150 none 151-200 2 units , 201--250  4 units and 250-300 6  301-350 8 units and max 10 if over 400. This was his SS at rehab.On this scale sicne seeing DR. Mitchell 2 days ago. Yesterday no humalog needed -only 16 units hs and b's yesterday were  and this am bg at 75.   Was taking Metformin prior to end of January hospitalization Prior to SS, bg's were down to 61.. He   denies polyuria, polydipsia, or any visual changes. He  too denies any skin lesions, skin breakdown or non-healing areas of skin. He  too denies any podiatric concerns. Ophthalmologic evaluation is up to date. Home glucose monitoring has shown values to be running on the low side  of late  running in the           \par HbA1c from 3/24/2022 returned at 6.6 %.\par Additional medical history includes that of Cva about 10-15 years ago.\par \par Doses have trach in place.  Is able top swallw..  The trach was placed last week. PO intake is reduced from prior-mostly soft foods.\par \par Weight in 170'as pre ca dx now in 150 range\par \par Plan is for chemo and Rt to start apparetnly in next rwo weeks.  MRI 4/1.\par \par FH  DM2  Two SIblings\par HTN Father and Mother\par Hyperlipidemia Father\par \par Diet:Reduced intake of late post tracg\par \par Labs from 324/2022 too shoed low end iron and elevated Ferritin and HB/Hct at 11.4/37.8\par TFT's were wnl.\par \par Meds: Atorvastatin, Gabapentin, Escitalopram and Vimpat along with Melatonin 3 mg and NAcl tabs\par

## 2022-05-24 ENCOUNTER — APPOINTMENT (OUTPATIENT)
Dept: UROLOGY | Facility: CLINIC | Age: 67
End: 2022-05-24
Payer: MEDICARE

## 2022-05-24 ENCOUNTER — NON-APPOINTMENT (OUTPATIENT)
Age: 67
End: 2022-05-24

## 2022-05-24 VITALS
DIASTOLIC BLOOD PRESSURE: 63 MMHG | WEIGHT: 135.69 LBS | HEART RATE: 103 BPM | SYSTOLIC BLOOD PRESSURE: 93 MMHG | RESPIRATION RATE: 16 BRPM | HEIGHT: 67 IN | OXYGEN SATURATION: 96 % | BODY MASS INDEX: 21.3 KG/M2 | TEMPERATURE: 98.06 F

## 2022-05-24 DIAGNOSIS — N20.9 URINARY CALCULUS, UNSPECIFIED: ICD-10-CM

## 2022-05-24 PROCEDURE — 99204 OFFICE O/P NEW MOD 45 MIN: CPT

## 2022-05-24 PROCEDURE — G6002: CPT | Mod: 26

## 2022-05-24 NOTE — ASSESSMENT
[FreeTextEntry1] : ct images reviewed\par advised abd discomfort not from nonobstructing stones \par more likely from constipation \par which is likely contributing to his voiding complaints as well\par advise increase hydration and take regular miralax \par if still bothersome can consider adding tamsulosin \par \par will observe nonobstructing stones \par will call for sudden onset right flank pain

## 2022-05-24 NOTE — DISEASE MANAGEMENT
[Clinical] : TNM Stage: c [N/A] : Currently not applicable [TTNM] : x [NTNM] : x [MTNM] : x [de-identified] : 5200 cgy [de-identified] : 6000 cgy [de-identified] : left partial brain

## 2022-05-24 NOTE — REASON FOR VISIT
[Routine On-Treatment] : a routine on-treatment visit for [Brain Tumor] : brain tumor [Spouse] : spouse [Family Member] : family member [[employer] ] : provided by [employer]   [Interpreters_IDNumber] : [id number] [Interpreters_FullName] : Meaghan [[id number]] : Irish

## 2022-05-24 NOTE — REVIEW OF SYSTEMS
[Constipation: Grade 1 - Occasional or intermittent symptoms; occasional use of stool softeners, laxatives, dietary modification, or enema] : Constipation: Grade 1 - Occasional or intermittent symptoms; occasional use of stool softeners, laxatives, dietary modification, or enema [Nausea: Grade 1 - Loss of appetite without alteration in eating habits] : Nausea: Grade 1 - Loss of appetite without alteration in eating habits [Vomiting: Grade 2 - 3 - 5 episodes ( by 5 minutes) in 24 hrs] : Vomiting: Grade 2 - 3 - 5 episodes ( by 5 minutes) in 24 hrs [Fatigue: Grade 1 - Fatigue relieved by rest] : Fatigue: Grade 1 - Fatigue relieved by rest [Blurred Vision: Grade 1 - Intervention not indicated] : Blurred Vision: Grade 1 - Intervention not indicated [Headache: Grade 1 - Mild pain] : Headache: Grade 1 - Mild pain [Peripheral Sensory Neuropathy: Grade 1 - Asymptomatic; loss of deep tendon reflexes or paresthesia] : Peripheral Sensory Neuropathy: Grade 1 - Asymptomatic; loss of deep tendon reflexes or paresthesia [Cough: Grade 1 - Mild symptoms; nonprescription intervention indicated] : Cough: Grade 1 - Mild symptoms; nonprescription intervention indicated [Dyspnea: Grade 1 - Shortness of breath with moderate exertion] : Dyspnea: Grade 1 - Shortness of breath with moderate exertion [Constipation] : constipation [Depression] : depression [Negative] : Allergic/Immunologic [Fatigue] : no fatigue [Visual Disturbances] : no visual disturbances [Dysphagia] : no dysphagia [Abdominal Pain] : no abdominal pain [de-identified] : mild headache, left side

## 2022-05-24 NOTE — HISTORY OF PRESENT ILLNESS
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 Guevara for certification of medical cannabis. \par \par In January 2022 he presented to the clinic while visiting the [place] with complaints of headaches and cognitive changes. He was diagnosed with a brain tumor. \par \par [place] then [place] the the [place] and presented to [facility] on 1/24/22 with speech impairment and confusion. Inpatient CT brain noted an extensive lesion with surrounding vasogenic edema in the left frontal and temporal lobes with involvement of the corpus callosum and medial left frontal lobe with marked ventricular effacement and a 1.4 cm left to right midline shift. \par \par Inpatient brain MRI on 1/25/22 showed evidence of a mass lesion in the left frontal lobe with significant mass effect on the left lateral ventricle and midline shift with surrounding vasogenic edema and subfalcine herniation. Suspicion of possible invasion of the corpus callosum noted. \par \par On 1/27/22 he underwent a left frontal craniotomy and resection of the left frontal enhancing tumor and Gleolan placement by Dr. Gupta. Pathology showed a gliosarcoma WHO grade 4. IDH-1 negative. \par \par He then presented to [facility] on 2/28/22 with complaints of dysphagia and vocal cord injury. On 3/2/22 he underwent tracheal stenosis, bronchial lavage and trachea biopsy which was negative for malignancy. Post-operative recovery was complicated by stridor. Tracheostomy on 3/17/22. \par \par He began RT to the left partial brain (planned total dose of 6000 cGy) with Dr. Guevara in April 2022. \par \par States he has nausea with vomiting and is taking Zofran. He had a resolution of symptoms with Zofran for 1 week but then rebounded. Abdominal pain noted. He states he has become weaker since starting radiation. Decreased appetite and poor oral intake noted. Weight loss of about 15 pounds noted. Daughter states weight loss is closer to 30 lbs.\par \par ct showed 2 nonobstructing stones right kidney \par has some straining with urination occasionally \par some discomfort with urination \par + constipation

## 2022-05-24 NOTE — PHYSICAL EXAM
[Normal] : oriented to person, place and time, the affect was normal, the mood was normal and not anxious [de-identified] : trach present

## 2022-05-24 NOTE — VITALS
[Maximal Pain Intensity: 1/10] : 1/10 [Least Pain Intensity: 0/10] : 0/10 [Pain Location: ___] : Pain Location: [unfilled] [80: Normal activity with effort; some signs or symptoms of disease.] : 80: Normal activity with effort; some signs or symptoms of disease.  [ECOG Performance Status: 1 - Restricted in physically strenuous activity but ambulatory and able to carry out work of a light or sedentary nature] : Performance Status: 1 - Restricted in physically strenuous activity but ambulatory and able to carry out work of a light or sedentary nature, e.g., light house work, office work [Date: ____________] : Patient's last distress assessment performed on [unfilled]. [7 - Distress Level] : Distress Level: 7 [Referred Patient  to social work for follow-up] : Patient was referred to social work for follow-up

## 2022-05-24 NOTE — HISTORY OF PRESENT ILLNESS
[FreeTextEntry1] : Mr. 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 [place] to [place] and 1/24 presented to [facility] with c/o confused speech and noted to responding by answering his name to all questions.\par \par 1/24/2022 CT Brain noted extensive lesion and surrounding vasogenic edema in left frontal and temporal lobes with involvement of the corpus callosum and medial left frontal lobe with marked ventricular effacement and 1.4 cm left to right midline shift.\par \par 1/25/2022 MRI Brain noted evidence of mass lesion in the left frontal lobe with significant mass effect on the left lateral ventricle and midline shift. Surrounding vasogenic edema. Subfalcine herniation. Suspicion of possible invasion of the corpus callosum. \par \par 1/27/2022 - he underwent left frontal craniotomy and resection of left frontal enhancing tumor and Gleolan placement by / Candy. Pathology revealed Gliosarcoma,  IDH-1 negative.\par \par 2/28/2022 - He returned to [facility] ED with complaints of difficulty swallowing and vocal cord injury. and 3/2/2022 - he underwent tracheal stenosis, bronchial lavage and trachea biopsy, negative for malignancy.\par \par He followed up with Dr. Gupta and Dr. Hill with discussion of starting adjuvant therapy, RT and possible Optune placement.\par \par 3/31/2022- Presents today to discuss treatment. Recovering well from his surgery. Notes generalized weakness, fatigue, blurred vision, and occasional neuropathy in feet since surgery. Tracheostomy appears clean and dry; he reports cough, mild 4/10 throat pain, tolerating soft diet. Having occasional HA (worse in evening) that resolves with Tylenol. Currently working with PT. When asked about emotional distress, pt appeared to struggle with disclosure. Daughter notes he recently started on Escitalopram 5 mg.\par \par 4/26//2022- Mr. Norwood presents today for on treatment visit. Has completed 1400/6000 cgy of radiation. dealt with nausea last week. Nausea slightly improved from last week, however still with vomiting today. no steroids at this time. denies headaches. slight fatigue and dizziness.\par \par 5/3/2022- Mr. Norwood presents today for on treatment visit. He has completed 2400/6000 cgy of radiation. KUB revealed a kidney stone. Today he feels well. notes abdominal pressure and nausea have improved. now moving bowels. feels some pressure in his chest hen laying down at night. never during the day and no shortness of breath.some cough at night. no steroids.\par \par 5/10/2022- Mr. Norwood presents today for on treatment visit. has completed 3200/6000 cgy of radiation. He is seen prior to treatment for vomiting episode. Family states they have increased frequency of Zofran from once daily to BID (resolution of symptoms x 1 week, but rebounded). Pt reports feeling lightheaded and unsteady on feet. Per family, he has not been tolerating oral intake and hydrating poorly. Remains on TMZ (last dose yesterday evening).\par \par 5/17/2022- Mr. Norwood presents today for on treatment visit. He has completed 4200/6000 cgy of radiation to the left partial brain. feeling much better. hydrating well, continues on temodar. Notes he still has slight abdominal pain after taking temodar pills. no nausea or vomiting at this time. only mild right sided headache from time to time. mild constipation.\par \par 5/24/2022- Mr. Norwood presents today for OTV. He has completed 5200/6000 cgy of radiation to the left partial brain. feeling well. denies headaches, nausea, focal weakness. still constipated, not taking miralax. doing his best to drink water.

## 2022-05-25 ENCOUNTER — RX RENEWAL (OUTPATIENT)
Age: 67
End: 2022-05-25

## 2022-05-25 PROCEDURE — G6002: CPT | Mod: 26

## 2022-05-26 LAB
APPEARANCE: CLEAR
BACTERIA UR CULT: NORMAL
BACTERIA: NEGATIVE
BILIRUBIN URINE: NEGATIVE
BLOOD URINE: NEGATIVE
COLOR: YELLOW
GLUCOSE QUALITATIVE U: NEGATIVE
HYALINE CASTS: 1 /LPF
KETONES URINE: NEGATIVE
LEUKOCYTE ESTERASE URINE: NEGATIVE
MICROSCOPIC-UA: NORMAL
NITRITE URINE: NEGATIVE
PH URINE: 8
PROTEIN URINE: NORMAL
RED BLOOD CELLS URINE: 2 /HPF
SPECIFIC GRAVITY URINE: 1.02
SQUAMOUS EPITHELIAL CELLS: 0 /HPF
UROBILINOGEN URINE: NORMAL
WHITE BLOOD CELLS URINE: 1 /HPF

## 2022-05-26 PROCEDURE — G6002: CPT | Mod: 26

## 2022-05-27 PROCEDURE — G6002: CPT | Mod: 26

## 2022-05-31 ENCOUNTER — NON-APPOINTMENT (OUTPATIENT)
Age: 67
End: 2022-05-31

## 2022-05-31 VITALS
SYSTOLIC BLOOD PRESSURE: 97 MMHG | HEIGHT: 67 IN | DIASTOLIC BLOOD PRESSURE: 68 MMHG | OXYGEN SATURATION: 96 % | HEART RATE: 106 BPM | TEMPERATURE: 96.98 F

## 2022-05-31 PROCEDURE — 77014: CPT | Mod: 26

## 2022-05-31 PROCEDURE — 77427 RADIATION TX MANAGEMENT X5: CPT

## 2022-05-31 NOTE — DISEASE MANAGEMENT
[Clinical] : TNM Stage: c [TTNM] : x [NTNM] : x [MTNM] : x [N/A] : Currently not applicable [de-identified] : 6000 cgy [de-identified] : 6000 cgy [de-identified] : left partial brain

## 2022-05-31 NOTE — REVIEW OF SYSTEMS
[Constipation: Grade 0] : Constipation: Grade 0 [Diarrhea: Grade 1 - Increase of <4 stools per day over baseline; mild increase in ostomy output compared to baseline] : Diarrhea: Grade 1 - Increase of <4 stools per day over baseline; mild increase in ostomy output compared to baseline [Nausea: Grade 0] : Nausea: Grade 0 [Vomiting: Grade 1 - 1 - 2 episodes ( by 5 minutes) in 24 hrs] : Vomiting: Grade 1 - 1 - 2 episodes ( by 5 minutes) in 24 hrs [Fatigue: Grade 1 - Fatigue relieved by rest] : Fatigue: Grade 1 - Fatigue relieved by rest [Blurred Vision: Grade 0] : Blurred Vision: Grade 0 [Cognitive Disturbance: Grade 1 - Mild cognitive disability; not interfering with work/school/life performance; specialized educational services/devices not indicated] : Cognitive Disturbance: Grade 1 - Mild cognitive disability; not interfering with work/school/life performance; specialized educational services/devices not indicated [Concentration Impairment: Grade 0] : Concentration Impairment: Grade 0 [Dizziness: Grade 0] : Dizziness: Grade 0  [Facial Muscle Weakness: Grade 0] : Facial Muscle Weakness: Grade 0 [Headache: Grade 0] : Headache: Grade 0 [Lethargy: Grade 0] : Lethargy: Grade 0 [Meningismus: Grade 0] : Meningismus: Grade 0 [Peripheral Motor Neuropathy: Grade 0] : Peripheral Motor Neuropathy: Grade 0 [Peripheral Sensory Neuropathy: Grade 0] : Peripheral Sensory Neuropathy: Grade 0 [Somnolence: Grade 0] : Somnolence: Grade 0 [Cough: Grade 1 - Mild symptoms; nonprescription intervention indicated] : Cough: Grade 1 - Mild symptoms; nonprescription intervention indicated [Dyspnea: Grade 1 - Shortness of breath with moderate exertion] : Dyspnea: Grade 1 - Shortness of breath with moderate exertion [Fatigue] : no fatigue [Visual Disturbances] : no visual disturbances [Dysphagia] : no dysphagia [Abdominal Pain] : no abdominal pain [Constipation] : constipation [Dizziness] : no dizziness [Fainting] : no fainting [Depression] : depression [Negative] : Allergic/Immunologic [FreeTextEntry4] : with trach [FreeTextEntry6] : cough at night [de-identified] : denies headaches

## 2022-05-31 NOTE — HISTORY OF PRESENT ILLNESS
[FreeTextEntry1] : Mr. 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 [place] and 1/24 presented to [facility] with c/o confused speech and noted to responding by answering his name to all questions.\par \par 1/24/2022 CT Brain noted extensive lesion and surrounding vasogenic edema in left frontal and temporal lobes with involvement of the corpus callosum and medial left frontal lobe with marked ventricular effacement and 1.4 cm left to right midline shift.\par \par 1/25/2022 MRI Brain noted evidence of mass lesion in the left frontal lobe with significant mass effect on the left lateral ventricle and midline shift. Surrounding vasogenic edema. Subfalcine herniation. Suspicion of possible invasion of the corpus callosum. \par \par 1/27/2022 - he underwent left frontal craniotomy and resection of left frontal enhancing tumor and Gleolan placement by / Candy. Pathology revealed Gliosarcoma,  IDH-1 negative.\par \par 2/28/2022 - He returned to [facility] ED with complaints of difficulty swallowing and vocal cord injury. and 3/2/2022 - he underwent tracheal stenosis, bronchial lavage and trachea biopsy, negative for malignancy.\par \par He followed up with Dr. Gupta and Dr. Hill with discussion of starting adjuvant therapy, RT and possible Optune placement.\par \par 3/31/2022- Presents today to discuss treatment. Recovering well from his surgery. Notes generalized weakness, fatigue, blurred vision, and occasional neuropathy in feet since surgery. Tracheostomy appears clean and dry; he reports cough, mild 4/10 throat pain, tolerating soft diet. Having occasional HA (worse in evening) that resolves with Tylenol. Currently working with PT. When asked about emotional distress, pt appeared to struggle with disclosure. Daughter notes he recently started on Escitalopram 5 mg.\par \par 4/26//2022- Mr. Norwood presents today for on treatment visit. Has completed 1400/6000 cgy of radiation. dealt with nausea last week. Nausea slightly improved from last week, however still with vomiting today. no steroids at this time. denies headaches. slight fatigue and dizziness.\par \par 5/3/2022- Mr. Norwood presents today for on treatment visit. He has completed 2400/6000 cgy of radiation. KUB revealed a kidney stone. Today he feels well. notes abdominal pressure and nausea have improved. now moving bowels. feels some pressure in his chest hen laying down at night. never during the day and no shortness of breath.some cough at night. no steroids.\par \par 5/10/2022- Mr. Norwood presents today for on treatment visit. has completed 3200/6000 cgy of radiation. He is seen prior to treatment for vomiting episode. Family states they have increased frequency of Zofran from once daily to BID (resolution of symptoms x 1 week, but rebounded). Pt reports feeling lightheaded and unsteady on feet. Per family, he has not been tolerating oral intake and hydrating poorly. Remains on TMZ (last dose yesterday evening).\par \par 5/17/2022- Mr. Norwood presents today for on treatment visit. He has completed 4200/6000 cgy of radiation to the left partial brain. feeling much better. hydrating well, continues on temodar. Notes he still has slight abdominal pain after taking temodar pills. no nausea or vomiting at this time. only mild right sided headache from time to time. mild constipation.\par \par 5/24/2022- Mr. Norwood presents today for OTV. He has completed 5200/6000 cgy of radiation to the left partial brain. feeling well. denies headaches, nausea, focal weakness. still constipated, not taking miralax. doing his best to drink water.\par \par 5/31/2022- Mr. Norwood presents for OTV. Completed radiation today, 6000/6000cgy to left partial brain. feels well. denies headaches. one episode vomiting this past week. overall more alert, taking CBD.

## 2022-05-31 NOTE — REASON FOR VISIT
[Routine On-Treatment] : a routine on-treatment visit for [Brain Tumor] : brain tumor [Spouse] : spouse [Family Member] : family member [[employer] ] : provided by [employer]   [Interpreters_IDNumber] : [id number] [Interpreters_FullName] : Meaghan [TWNoteComboBox1] : [place]

## 2022-05-31 NOTE — PHYSICAL EXAM
[Normal] : oriented to person, place and time, the affect was normal, the mood was normal and not anxious [de-identified] : trach present

## 2022-06-01 ENCOUNTER — NON-APPOINTMENT (OUTPATIENT)
Age: 67
End: 2022-06-01

## 2022-06-06 NOTE — PRE-OP CHECKLIST - WEIGHT IN LBS
171.9 Metronidazole Counseling:  I discussed with the patient the risks of metronidazole including but not limited to seizures, nausea/vomiting, a metallic taste in the mouth, nausea/vomiting and severe allergy.

## 2022-06-09 ENCOUNTER — NON-APPOINTMENT (OUTPATIENT)
Age: 67
End: 2022-06-09

## 2022-06-09 ENCOUNTER — APPOINTMENT (OUTPATIENT)
Dept: CARDIOLOGY | Facility: CLINIC | Age: 67
End: 2022-06-09
Payer: MEDICARE

## 2022-06-09 ENCOUNTER — EMERGENCY (EMERGENCY)
Facility: HOSPITAL | Age: 67
LOS: 1 days | Discharge: HOME | End: 2022-06-09
Attending: EMERGENCY MEDICINE
Payer: MEDICARE

## 2022-06-09 VITALS
DIASTOLIC BLOOD PRESSURE: 60 MMHG | HEART RATE: 86 BPM | RESPIRATION RATE: 18 BRPM | OXYGEN SATURATION: 97 % | SYSTOLIC BLOOD PRESSURE: 93 MMHG | HEIGHT: 67 IN | WEIGHT: 160.06 LBS | TEMPERATURE: 98 F

## 2022-06-09 VITALS
HEIGHT: 67 IN | DIASTOLIC BLOOD PRESSURE: 52 MMHG | OXYGEN SATURATION: 99 % | BODY MASS INDEX: 20.56 KG/M2 | HEART RATE: 97 BPM | WEIGHT: 131 LBS | SYSTOLIC BLOOD PRESSURE: 72 MMHG | TEMPERATURE: 208.4 F

## 2022-06-09 VITALS
OXYGEN SATURATION: 98 % | TEMPERATURE: 98 F | HEART RATE: 84 BPM | DIASTOLIC BLOOD PRESSURE: 64 MMHG | RESPIRATION RATE: 19 BRPM | SYSTOLIC BLOOD PRESSURE: 113 MMHG

## 2022-06-09 DIAGNOSIS — Z98.890 OTHER SPECIFIED POSTPROCEDURAL STATES: Chronic | ICD-10-CM

## 2022-06-09 DIAGNOSIS — T78.40XA ALLERGY, UNSPECIFIED, INITIAL ENCOUNTER: ICD-10-CM

## 2022-06-09 DIAGNOSIS — R05.9 COUGH, UNSPECIFIED: ICD-10-CM

## 2022-06-09 DIAGNOSIS — L29.9 PRURITUS, UNSPECIFIED: ICD-10-CM

## 2022-06-09 LAB
ALBUMIN SERPL ELPH-MCNC: 3 G/DL — LOW (ref 3.3–5)
ALP SERPL-CCNC: 85 U/L — SIGNIFICANT CHANGE UP (ref 40–120)
ALT FLD-CCNC: 12 U/L — SIGNIFICANT CHANGE UP (ref 10–45)
ANION GAP SERPL CALC-SCNC: 14 MMOL/L — SIGNIFICANT CHANGE UP (ref 5–17)
APPEARANCE UR: CLEAR — SIGNIFICANT CHANGE UP
AST SERPL-CCNC: 15 U/L — SIGNIFICANT CHANGE UP (ref 10–40)
BACTERIA # UR AUTO: NEGATIVE — SIGNIFICANT CHANGE UP
BASOPHILS # BLD AUTO: 0.11 K/UL — SIGNIFICANT CHANGE UP (ref 0–0.2)
BASOPHILS NFR BLD AUTO: 1.7 % — SIGNIFICANT CHANGE UP (ref 0–2)
BILIRUB SERPL-MCNC: 0.5 MG/DL — SIGNIFICANT CHANGE UP (ref 0.2–1.2)
BILIRUB UR-MCNC: NEGATIVE — SIGNIFICANT CHANGE UP
BUN SERPL-MCNC: 10 MG/DL — SIGNIFICANT CHANGE UP (ref 7–23)
CALCIUM SERPL-MCNC: 8.2 MG/DL — LOW (ref 8.4–10.5)
CHLORIDE SERPL-SCNC: 97 MMOL/L — SIGNIFICANT CHANGE UP (ref 96–108)
CO2 SERPL-SCNC: 25 MMOL/L — SIGNIFICANT CHANGE UP (ref 22–31)
COLOR SPEC: YELLOW — SIGNIFICANT CHANGE UP
CREAT SERPL-MCNC: 0.62 MG/DL — SIGNIFICANT CHANGE UP (ref 0.5–1.3)
DIFF PNL FLD: NEGATIVE — SIGNIFICANT CHANGE UP
EGFR: 105 ML/MIN/1.73M2 — SIGNIFICANT CHANGE UP
EOSINOPHIL # BLD AUTO: 1.7 K/UL — HIGH (ref 0–0.5)
EOSINOPHIL NFR BLD AUTO: 26.1 % — HIGH (ref 0–6)
EPI CELLS # UR: 0 /HPF — SIGNIFICANT CHANGE UP
GLUCOSE SERPL-MCNC: 86 MG/DL — SIGNIFICANT CHANGE UP (ref 70–99)
GLUCOSE UR QL: NEGATIVE — SIGNIFICANT CHANGE UP
HCT VFR BLD CALC: 38.6 % — LOW (ref 39–50)
HGB BLD-MCNC: 12.4 G/DL — LOW (ref 13–17)
HYALINE CASTS # UR AUTO: 1 /LPF — SIGNIFICANT CHANGE UP (ref 0–2)
KETONES UR-MCNC: SIGNIFICANT CHANGE UP
LEUKOCYTE ESTERASE UR-ACNC: NEGATIVE — SIGNIFICANT CHANGE UP
LYMPHOCYTES # BLD AUTO: 0.33 K/UL — LOW (ref 1–3.3)
LYMPHOCYTES # BLD AUTO: 5 % — LOW (ref 13–44)
MAGNESIUM SERPL-MCNC: 1.5 MG/DL — LOW (ref 1.6–2.6)
MANUAL SMEAR VERIFICATION: SIGNIFICANT CHANGE UP
MCHC RBC-ENTMCNC: 30 PG — SIGNIFICANT CHANGE UP (ref 27–34)
MCHC RBC-ENTMCNC: 32.1 GM/DL — SIGNIFICANT CHANGE UP (ref 32–36)
MCV RBC AUTO: 93.5 FL — SIGNIFICANT CHANGE UP (ref 80–100)
MONOCYTES # BLD AUTO: 0.44 K/UL — SIGNIFICANT CHANGE UP (ref 0–0.9)
MONOCYTES NFR BLD AUTO: 6.7 % — SIGNIFICANT CHANGE UP (ref 2–14)
NEUTROPHILS # BLD AUTO: 3.66 K/UL — SIGNIFICANT CHANGE UP (ref 1.8–7.4)
NEUTROPHILS NFR BLD AUTO: 55.5 % — SIGNIFICANT CHANGE UP (ref 43–77)
NEUTS BAND # BLD: 0.8 % — SIGNIFICANT CHANGE UP (ref 0–8)
NITRITE UR-MCNC: NEGATIVE — SIGNIFICANT CHANGE UP
PH UR: 7 — SIGNIFICANT CHANGE UP (ref 5–8)
PHOSPHATE SERPL-MCNC: 4 MG/DL — SIGNIFICANT CHANGE UP (ref 2.5–4.5)
PLAT MORPH BLD: NORMAL — SIGNIFICANT CHANGE UP
PLATELET # BLD AUTO: 119 K/UL — LOW (ref 150–400)
POTASSIUM SERPL-MCNC: 4.2 MMOL/L — SIGNIFICANT CHANGE UP (ref 3.5–5.3)
POTASSIUM SERPL-SCNC: 4.2 MMOL/L — SIGNIFICANT CHANGE UP (ref 3.5–5.3)
PROT SERPL-MCNC: 5.9 G/DL — LOW (ref 6–8.3)
PROT UR-MCNC: ABNORMAL
RBC # BLD: 4.13 M/UL — LOW (ref 4.2–5.8)
RBC # FLD: 15.3 % — HIGH (ref 10.3–14.5)
RBC BLD AUTO: SIGNIFICANT CHANGE UP
RBC CASTS # UR COMP ASSIST: 2 /HPF — SIGNIFICANT CHANGE UP (ref 0–4)
SODIUM SERPL-SCNC: 136 MMOL/L — SIGNIFICANT CHANGE UP (ref 135–145)
SP GR SPEC: 1.03 — HIGH (ref 1.01–1.02)
UROBILINOGEN FLD QL: NEGATIVE — SIGNIFICANT CHANGE UP
VARIANT LYMPHS # BLD: 4.2 % — SIGNIFICANT CHANGE UP (ref 0–6)
WBC # BLD: 6.5 K/UL — SIGNIFICANT CHANGE UP (ref 3.8–10.5)
WBC # FLD AUTO: 6.5 K/UL — SIGNIFICANT CHANGE UP (ref 3.8–10.5)
WBC UR QL: 1 /HPF — SIGNIFICANT CHANGE UP (ref 0–5)

## 2022-06-09 PROCEDURE — 99284 EMERGENCY DEPT VISIT MOD MDM: CPT

## 2022-06-09 PROCEDURE — 81001 URINALYSIS AUTO W/SCOPE: CPT

## 2022-06-09 PROCEDURE — 71045 X-RAY EXAM CHEST 1 VIEW: CPT | Mod: 26

## 2022-06-09 PROCEDURE — 85025 COMPLETE CBC W/AUTO DIFF WBC: CPT

## 2022-06-09 PROCEDURE — 99214 OFFICE O/P EST MOD 30 MIN: CPT | Mod: 25

## 2022-06-09 PROCEDURE — 36415 COLL VENOUS BLD VENIPUNCTURE: CPT

## 2022-06-09 PROCEDURE — 80053 COMPREHEN METABOLIC PANEL: CPT

## 2022-06-09 PROCEDURE — 71045 X-RAY EXAM CHEST 1 VIEW: CPT

## 2022-06-09 PROCEDURE — 84100 ASSAY OF PHOSPHORUS: CPT

## 2022-06-09 PROCEDURE — 93000 ELECTROCARDIOGRAM COMPLETE: CPT

## 2022-06-09 PROCEDURE — 83735 ASSAY OF MAGNESIUM: CPT

## 2022-06-09 PROCEDURE — 87086 URINE CULTURE/COLONY COUNT: CPT

## 2022-06-09 PROCEDURE — 99285 EMERGENCY DEPT VISIT HI MDM: CPT | Mod: 25

## 2022-06-09 RX ORDER — SODIUM CHLORIDE 9 MG/ML
500 INJECTION INTRAMUSCULAR; INTRAVENOUS; SUBCUTANEOUS ONCE
Refills: 0 | Status: DISCONTINUED | OUTPATIENT
Start: 2022-06-09 | End: 2022-06-09

## 2022-06-09 RX ORDER — SODIUM CHLORIDE 9 MG/ML
1000 INJECTION INTRAMUSCULAR; INTRAVENOUS; SUBCUTANEOUS ONCE
Refills: 0 | Status: COMPLETED | OUTPATIENT
Start: 2022-06-09 | End: 2022-06-09

## 2022-06-09 RX ADMIN — SODIUM CHLORIDE 1000 MILLILITER(S): 9 INJECTION INTRAMUSCULAR; INTRAVENOUS; SUBCUTANEOUS at 15:41

## 2022-06-09 NOTE — ED PROVIDER NOTE - OBJECTIVE STATEMENT
Patient offered services. Prefers family member Roxanne serving as     68 yo M with pmh of htn, Grade 4 Gliosarcoma s/p left sided craniotomy with chito (1/27/22) on Vimpat 100mg BID and PO decadron s/p, Tracheostomy presenting for hypotension. Patient was at routine visit today when his pressure was taken and was 70s/50s. He usually is 90s/70s but states that he has not been drinking much as his appetite has been poor. Is on chemo but currently on a chemo "brake". Endorses seeing some black stool but no red. Also with intermittent cough. No fevers, chills, chest pain, sob, LE swelling. Got 400ccs at the office with improvement in his BP.

## 2022-06-09 NOTE — ED ADULT NURSE NOTE - OBJECTIVE STATEMENT
Received pt in assigned area a&xo3, as per daughter, pt  sent by Cardiologist due to hypotension while at his appointment, BP= 70/50 , given 400cc of NS in the  office, 90/60 prior to coming to ED, daughter states when pt woke up he felt dizzy but had no complaints while at Cardiologist office  & has no complaints at this time, denies any chest pain, dizziness, sob, weakness, h/a or n/v, moving all extremities, pt placed on cardiac monitor BP=94/65,  pt has trachea intact, o2 98% to room air, as per daughter, trach placed in February, pt has brain cancer, recently completed his 30 day course of chemo on 5/31, additional ivl placed and labs drawn, daughter means at bedside with patient.

## 2022-06-09 NOTE — HISTORY OF PRESENT ILLNESS
[FreeTextEntry1] : This is a 67 year male with a Pmhx of DM2, Gliosarcoma s/p resection of the left frontal enhancing tumor and Gleolan placed, tracheal stenosis and s/p Tracheal balloon dilatation on 3/2/22 and Tracheostomy on 3/17/22. \par  who presents to the office for follow up. pt last seen on 5/10/22 with N/V. CT a/P showing 4mm/2mm non obstructing Right renal stone \par daughter here in office \par \par reports abdominal pain N/V has resolved \par \par pt reports generalized itchiness for the last week  always reports worsen at night - \par Denies any new creams or soaps \par reports pt recently started THC/CBD - 10mg/5.25mg a day - for the last 3 weeks \par \par

## 2022-06-09 NOTE — ED PROVIDER NOTE - NS ED ROS FT
Gen: Denies fever, chills, + generalized weakness  CV: Denies chest pain, palpitations  HEENT: Denies neck pain, headache, vision changes  Skin: Denies rash, erythema, color changes  Resp: Denies SOB, +cough  Endo: Denies sensitivity to heat, cold, increased urination  GI: Denies constipation, nausea, vomiting, diarrhea  Msk: Denies back pain, LE swelling, extremity pain  : Denies dysuria, increased frequency  Neuro: Denies LOC, focal weakness, numbness, tingling  Psych: Denies hx of psych, SI, HI

## 2022-06-09 NOTE — ED PROVIDER NOTE - PHYSICAL EXAMINATION
Gen: Alert and oriented. Lying comfortably in bed. Answering questions appropriately  HEENT: extra occular movements intact, no nasal discharge, mucous membranes moist  CV: Regular rate and rhythm, +S1/S2, no murmurs/rubs/gallops,   Resp: Tracheostomy in place. Able to speak. No increased work of breathing. Clear to ausculation bilaterally, no wheezes/rhonchi/rales  GI: Abdomen soft non-distended, non tender to palpation, no masses  MSK: Craniotomy scar at head No open wounds, no bruising, no LE edema, Homans sign negative bl  Neuro: A&Ox4, following commands, moving all four extremities spontaneously  Psych: appropriate mood

## 2022-06-09 NOTE — ED PROVIDER NOTE - NSFOLLOWUPINSTRUCTIONS_ED_ALL_ED_FT
Please follow up with your doctor this week. Please bring your attached results with you.     As discussed, please follow up with GI to discuss possible feeding tube. Also please follow up with dermatology for your rash.     Rest well and stay hydrated.     Return to the Emergency Department for worsening or persistent symptoms, and/or ANY NEW OR CONCERNING SYMPTOMS. If you have issues obtaining follow up, please call: [phone] to obtain a doctor or specialist who takes your insurance in your area.

## 2022-06-09 NOTE — CONSULT NOTE ADULT - SUBJECTIVE AND OBJECTIVE BOX
DATE OF SERVICE: 06-09-22 @ 23:21    CHIEF COMPLAINT:Patient is a 67y old  Male who presents with a chief complaint of     HISTORY OF PRESENT ILLNESS:HPI:   · HPI Objective Statement: Patient offered services. Prefers family member Roxanne serving as     	66 yo M with pmh of htn, Grade 4 Gliosarcoma s/p left sided craniotomy with gleloan (1/27/22) on Vimpat 100mg BID and PO decadron s/p, Tracheostomy presenting for hypotension. Patient was at routine visit today when his pressure was taken and was 70s/50s. He usually is 90s/70s but states that he has not been drinking much as his appetite has been poor. Is on chemo but currently on a chemo "brake". Endorses seeing some black stool but no red. Also with intermittent cough. No fevers, chills, chest pain, sob, LE swelling. Got 400ccs at the office with improvement in his BP.        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      Type 2 diabetes mellitus      S/P craniotomy              MEDICATIONS:                  FAMILY HISTORY:      Non-contributory    SOCIAL HISTORY:    [ ] not a current smoker    Allergies    No Known Allergies    Intolerances    	    REVIEW OF SYSTEMS:  CONSTITUTIONAL: No fever  EYES: No eye pain, visual disturbances, or discharge  ENMT:  No difficulty hearing, tinnitus  NECK: No pain or stiffness  RESPIRATORY: No cough, wheezing,  CARDIOVASCULAR: No chest pain, palpitations, passing out, dizziness, or leg swelling  GASTROINTESTINAL:  No nausea, vomiting, diarrhea or constipation. No melena.  GENITOURINARY: No dysuria, hematuria  NEUROLOGICAL: No stroke like symptoms  SKIN: No burning or lesions   ENDOCRINE: No heat or cold intolerance  MUSCULOSKELETAL: No joint pain or swelling  PSYCHIATRIC: No  anxiety, mood swings  HEME/LYMPH: No bleeding gums  ALLERGY AND IMMUNOLOGIC: No hives or eczema	    All other ROS negative    PHYSICAL EXAM:  T(C): 36.9 (06-09-22 @ 17:37), Max: 37.3 (06-09-22 @ 15:46)  HR: 84 (06-09-22 @ 17:37) (84 - 86)  BP: 113/64 (06-09-22 @ 17:37) (93/60 - 113/64)  RR: 19 (06-09-22 @ 17:37) (18 - 19)  SpO2: 98% (06-09-22 @ 17:37) (97% - 98%)  Wt(kg): --  I&O's Summary      Appearance: Normal	  HEENT:   Normal oral mucosa, EOMI	  Cardiovascular:  S1 S2, No JVD,    Respiratory: Lungs clear to auscultation	  Psychiatry: Alert  Gastrointestinal:  Soft, Non-tender, + BS	  Skin: No rashes   Neurologic: Non-focal  Extremities:  No edema  Vascular: Peripheral pulses palpable    	    	  	  CARDIAC MARKERS:  Labs personally reviewed by me                                  12.4   6.50  )-----------( 119      ( 09 Jun 2022 15:56 )             38.6     06-09    136  |  97  |  10  ----------------------------<  86  4.2   |  25  |  0.62    Ca    8.2<L>      09 Jun 2022 15:56  Phos  4.0     06-09  Mg     1.5     06-09    TPro  5.9<L>  /  Alb  3.0<L>  /  TBili  0.5  /  DBili  x   /  AST  15  /  ALT  12  /  AlkPhos  85  06-09           Radiology: Personally reviewed by me - trach in place, clear lungs      Assessment /Plan:   	66 yo M with pmh of htn, Grade 4 Gliosarcoma s/p left sided craniotomy with gleloan (1/27/22) on Vimpat 100mg BID and PO decadron s/p, Tracheostomy presenting for hypotension. Patient was at routine visit today when his pressure was taken and was 70s/50s. He usually is 90s/70s but states that he has not been drinking much as his appetite has been poor. Is on chemo but currently on a chemo "brake". Endorses seeing some black stool but no red. Also with intermittent cough. No fevers, chills, chest pain, sob, LE swelling. Got 400ccs at the office with improvement in his BP.    1. ASx hypotension -- r/o occult infection  - likely 2/2 prerenal state  - responded well to IVF        Differential diagnosis and plan of care discussed with patient after the evaluation. Counseling on diet, nutritional counseling, weight management, exercise and medication compliance was done.   Advanced care planning/advanced directives discussed with patient/family. DNR status including forceful chest compressions to attempt to restart the heart, ventilator support/artificial breathing, electric shock, artificial nutrition, health care proxy, Molst form all discussed with pt. Pt wishes to consider. More than fifteen minutes spent on discussing advanced directives         Kj Marvin DO [facility]  Cardiovascular Medicine  [address] [place] [address], [address]  Office [phone]  Cell [phone]

## 2022-06-09 NOTE — ED PROVIDER NOTE - PATIENT PORTAL LINK FT
You can access the FollowMyHealth Patient Portal offered by [facility] by registering at the following website: [email or web address]. By joining [facility]’s FollowMyHealth portal, you will also be able to view your health information using other applications (apps) compatible with our system.

## 2022-06-09 NOTE — ED PROVIDER NOTE - PROGRESS NOTE DETAILS
Joseph Frankel PGY3: Work up non actionable. Patient at baseline. BP has remained wnl since arrival. Given weight loss and lack of PO intake discussed the importance of following up with GI for potential feeding tube. Also will give dermatology follow up for diffuse itching. Discussed plan and return precautions with patient and family member who understands and agrees. All questions answered.

## 2022-06-09 NOTE — ED PROVIDER NOTE - ATTENDING CONTRIBUTION TO CARE
Pt with daughter, stg IV brain cancer, hypotensive episode, improved with IVF here, noted poor intake of water and food over past few weeks. No fever.  Pt cachectic, nontender abdomen, nontoxic appearing.  Had discussion with daughter and patient about possibility of feeding tube.  Will consider, wants to go home.

## 2022-06-09 NOTE — ED PROVIDER NOTE - CLINICAL SUMMARY MEDICAL DECISION MAKING FREE TEXT BOX
Joseph Frankel PGY3: Patient with new hypotension from office now resolved. VSS. Patient looks well and is non toxic appearing. PE as above. Likely malnutrion/dehydration. Also consider infection vs anemia. Will get labs, imaging, give fluids, and reassess.

## 2022-06-09 NOTE — ED PROVIDER NOTE - NSFOLLOWUPCLINICS_GEN_ALL_ED_FT
[facility] [place]  Dermatology  [address], [address]  [place] [address]  Phone: [phone]  Fax: [phone]    [facility]  Gastroenterology  [address] [place] [address] [address]  [place] [address]  Phone: [phone]  Fax:

## 2022-06-10 LAB
CULTURE RESULTS: SIGNIFICANT CHANGE UP
SPECIMEN SOURCE: SIGNIFICANT CHANGE UP

## 2022-06-16 ENCOUNTER — APPOINTMENT (OUTPATIENT)
Dept: GASTROENTEROLOGY | Facility: CLINIC | Age: 67
End: 2022-06-16
Payer: MEDICARE

## 2022-06-16 ENCOUNTER — NON-APPOINTMENT (OUTPATIENT)
Age: 67
End: 2022-06-16

## 2022-06-16 VITALS
WEIGHT: 131 LBS | BODY MASS INDEX: 20.56 KG/M2 | SYSTOLIC BLOOD PRESSURE: 100 MMHG | DIASTOLIC BLOOD PRESSURE: 68 MMHG | HEIGHT: 67 IN | HEART RATE: 95 BPM

## 2022-06-16 DIAGNOSIS — R11.2 NAUSEA WITH VOMITING, UNSPECIFIED: ICD-10-CM

## 2022-06-16 PROCEDURE — 99203 OFFICE O/P NEW LOW 30 MIN: CPT

## 2022-06-16 PROCEDURE — 82274 ASSAY TEST FOR BLOOD FECAL: CPT | Mod: QW

## 2022-06-16 RX ORDER — PANTOPRAZOLE 40 MG/1
40 TABLET, DELAYED RELEASE ORAL DAILY
Qty: 30 | Refills: 2 | Status: DISCONTINUED | COMMUNITY
Start: 2022-03-09 | End: 2022-06-16

## 2022-06-16 RX ORDER — AMOXICILLIN AND CLAVULANATE POTASSIUM 875; 125 MG/1; MG/1
875-125 TABLET, COATED ORAL
Qty: 6 | Refills: 0 | Status: DISCONTINUED | COMMUNITY
Start: 2022-03-03

## 2022-06-16 RX ORDER — BLOOD-GLUCOSE METER
W/DEVICE KIT MISCELLANEOUS
Qty: 1 | Refills: 0 | Status: ACTIVE | COMMUNITY
Start: 2022-02-24

## 2022-06-16 NOTE — REASON FOR VISIT
[Consultation] : a consultation visit [Other: _____] : [unfilled] [FreeTextEntry1] : Pt was diagnosed with brain cancer, when he swallows it comes up

## 2022-06-16 NOTE — ASSESSMENT
[FreeTextEntry1] : 1.  Intermittent nausea, regurgitation, history of GERD, with abnormal swallowing studies--suspect underlying upper GI motility disorder, such as gastroparesis, ineffective esophageal motility.  May have concomitant partial outlet obstruction and constipation contributing.  Possibility of underlying tumor, but at this point, looking for easy to treat conditions.\par 2.  Gliosarcoma status post surgery, chemotherapy, RT.\par 3.  Malnutrition, weight loss.\par 4.  Status post tracheostomy.\par 5.  Type 2 diabetes mellitus.\par 6.  Hyperlipidemia.\par 7.  Allergic to amoxicillin.\par \par Plan:\par 1.  Medical records reviewed.\par 2.  Although EGD could be performed, I suspect that the potential risks far outweigh the benefits in this clinical scenario.  We are looking for easy to treat conditions at this point.\par 3.  Therefore, conservative measures would include increasing omeprazole to 40 mg AC twice daily and adding ondansetron 8 mg AC twice daily; can increase ondansetron to is much as 8 mg AC 3 times daily as tolerated and if needed.\par 4.  Optimize bowel regimen--can take MiraLAX 1 capful in 8 ounces of fluid twice daily, along with the Senokot either regularly or as needed.\par 5.  Although PEG tube has been discussed with patient/family, they are hesitant, given difficulty caring for same and overall prognosis.\par 6.  Dietary instruction given.\par 7.  Return here as needed.

## 2022-06-16 NOTE — HISTORY OF PRESENT ILLNESS
[FreeTextEntry1] : Osiel underwent surgery and some chemotherapy, RT for gliosarcoma.  He has been unsteady on his feet, tracheostomy, and some issues with swallowing, regurgitation, and constipation.  He has been able to tolerate liquids, but notes that when he eats food or solids, he feels full quickly, with food coming back up.  He also notes anorexia and constipation.  He has been taking omeprazole 20 mg twice daily, Senokot 3 daily, with incomplete improvement.  He had undergone swallowing studies this past winter, revealing slight decrease in esophageal motility and some penetration, but no tameka aspiration.  He has type 2 DM, as well.

## 2022-06-16 NOTE — REVIEW OF SYSTEMS
[Feeling Poorly] : feeling poorly [Feeling Tired] : feeling tired [Recent Weight Loss (___ Lbs)] : recent [unfilled] ~Ulb weight loss [Heart Rate Is Slow] : slow heart rate [Lower Ext Edema] : lower extremity edema [Abdominal Pain] : abdominal pain [Vomiting] : vomiting [Constipation] : constipation [Confused] : confusion [Dizziness] : dizziness [Fainting] : fainting [Limb Weakness] : limb weakness [Difficulty Walking] : difficulty walking [Sleep Disturbances] : sleep disturbances [Anxiety] : anxiety [Depression] : depression [Negative] : Heme/Lymph [As Noted in HPI] : as noted in HPI [FreeTextEntry7] : Dark Stool [FreeTextEntry8] : Slow urination [FreeTextEntry1] : Pt couldn’t breathe has tube, no appetite, pt had 1st round of chemo

## 2022-06-16 NOTE — PHYSICAL EXAM
[General Appearance - Alert] : alert [General Appearance - In No Acute Distress] : in no acute distress [General Appearance - Well Developed] : well developed [Sclera] : the sclera and conjunctiva were normal [Auscultation Breath Sounds / Voice Sounds] : lungs were clear to auscultation bilaterally [Heart Rate And Rhythm] : heart rate was normal and rhythm regular [Heart Sounds] : normal S1 and S2 [Heart Sounds Gallop] : no gallops [Murmurs] : no murmurs [Heart Sounds Pericardial Friction Rub] : no pericardial rub [Full Pulse] : the pedal pulses are present [Edema] : there was no peripheral edema [Abdomen Soft] : soft [Abdomen Tenderness] : non-tender [Abdomen Mass (___ Cm)] : no abdominal mass palpated [Diminished] : diminished [Normal Sphincter Tone] : normal sphincter tone [No Rectal Mass] : no rectal mass [Internal Hemorrhoid] : no internal hemorrhoids [External Hemorrhoid] : no external hemorrhoids [Occult Blood Positive] : stool was negative for occult blood [FreeTextEntry1] : moderate amount soft green stool, FIT negative [Prostate Tenderness] : was not tender [Prostate Size___ (Scale 0-4)] : prostate size was [unfilled] on a scale of 0-4 [Cervical Lymph Nodes Enlarged Posterior Bilaterally] : posterior cervical [Cervical Lymph Nodes Enlarged Anterior Bilaterally] : anterior cervical [Supraclavicular Lymph Nodes Enlarged Bilaterally] : supraclavicular [Inguinal Lymph Nodes Enlarged Bilaterally] : inguinal [Nail Clubbing] : no clubbing  or cyanosis of the fingernails [Musculoskeletal - Swelling] : no joint swelling seen [Motor Tone] : muscle strength and tone were normal [Skin Color & Pigmentation] : normal skin color and pigmentation [Skin Turgor] : normal skin turgor [] : no rash

## 2022-06-16 NOTE — CONSULT LETTER
[Dear  ___] : Dear  [unfilled], [Courtesy Letter:] : I had the pleasure of seeing your patient, [unfilled], in my office today. [Please see my note below.] : Please see my note below. [Consult Closing:] : Thank you very much for allowing me to participate in the care of this patient.  If you have any questions, please do not hesitate to contact me. [Sincerely,] : Sincerely, [FreeTextEntry3] : Wes Davis M.D.\par

## 2022-06-21 ENCOUNTER — LABORATORY RESULT (OUTPATIENT)
Age: 67
End: 2022-06-21

## 2022-06-21 ENCOUNTER — APPOINTMENT (OUTPATIENT)
Dept: ENDOCRINOLOGY | Facility: CLINIC | Age: 67
End: 2022-06-21
Payer: MEDICARE

## 2022-06-21 ENCOUNTER — RX RENEWAL (OUTPATIENT)
Age: 67
End: 2022-06-21

## 2022-06-21 VITALS
SYSTOLIC BLOOD PRESSURE: 90 MMHG | OXYGEN SATURATION: 95 % | HEIGHT: 67 IN | BODY MASS INDEX: 20.72 KG/M2 | DIASTOLIC BLOOD PRESSURE: 62 MMHG | HEART RATE: 104 BPM | WEIGHT: 132 LBS | TEMPERATURE: 208.76 F

## 2022-06-21 LAB
GLUCOSE BLDC GLUCOMTR-MCNC: 89
HBA1C MFR BLD HPLC: 5.6

## 2022-06-21 PROCEDURE — 82962 GLUCOSE BLOOD TEST: CPT

## 2022-06-21 PROCEDURE — 83036 HEMOGLOBIN GLYCOSYLATED A1C: CPT | Mod: QW

## 2022-06-21 PROCEDURE — 99214 OFFICE O/P EST MOD 30 MIN: CPT

## 2022-06-21 RX ORDER — LANCETS 33 GAUGE
EACH MISCELLANEOUS
Qty: 4 | Refills: 3 | Status: ACTIVE | COMMUNITY
Start: 2022-03-26 | End: 1900-01-01

## 2022-06-22 LAB
25(OH)D3 SERPL-MCNC: 33.1 NG/ML
BASOPHILS # BLD AUTO: 0.05 K/UL
BASOPHILS NFR BLD AUTO: 0.8 %
CHOLEST SERPL-MCNC: 110 MG/DL
CREAT SPEC-SCNC: 120 MG/DL
EOSINOPHIL # BLD AUTO: 1.88 K/UL
EOSINOPHIL NFR BLD AUTO: 30.7 %
ESTIMATED AVERAGE GLUCOSE: 114 MG/DL
FRUCTOSAMINE SERPL-MCNC: 212 UMOL/L
GLYCOMARK.: 3.7 UG/ML
HBA1C MFR BLD HPLC: 5.6 %
HCT VFR BLD CALC: 41.9 %
HDLC SERPL-MCNC: 55 MG/DL
HGB BLD-MCNC: 13.6 G/DL
IMM GRANULOCYTES NFR BLD AUTO: 0.2 %
LDLC SERPL DIRECT ASSAY-MCNC: 35 MG/DL
LYMPHOCYTES # BLD AUTO: 1.37 K/UL
LYMPHOCYTES NFR BLD AUTO: 22.4 %
MAN DIFF?: NORMAL
MCHC RBC-ENTMCNC: 30.3 PG
MCHC RBC-ENTMCNC: 32.5 GM/DL
MCV RBC AUTO: 93.3 FL
MICROALBUMIN 24H UR DL<=1MG/L-MCNC: <1.2 MG/DL
MICROALBUMIN/CREAT 24H UR-RTO: NORMAL MG/G
MONOCYTES # BLD AUTO: 0.56 K/UL
MONOCYTES NFR BLD AUTO: 9.2 %
NEUTROPHILS # BLD AUTO: 2.25 K/UL
NEUTROPHILS NFR BLD AUTO: 36.7 %
PLATELET # BLD AUTO: 130 K/UL
RBC # BLD: 4.49 M/UL
RBC # FLD: 15.8 %
T3FREE SERPL-MCNC: 2.22 PG/ML
T4 FREE SERPL-MCNC: 1 NG/DL
TRIGL SERPL-MCNC: 89 MG/DL
TSH SERPL-ACNC: 1.66 UIU/ML
WBC # FLD AUTO: 6.12 K/UL

## 2022-06-23 ENCOUNTER — APPOINTMENT (OUTPATIENT)
Dept: MRI IMAGING | Facility: IMAGING CENTER | Age: 67
End: 2022-06-23

## 2022-06-23 ENCOUNTER — APPOINTMENT (OUTPATIENT)
Dept: NEUROLOGY | Facility: CLINIC | Age: 67
End: 2022-06-23
Payer: MEDICARE

## 2022-06-23 ENCOUNTER — OUTPATIENT (OUTPATIENT)
Dept: OUTPATIENT SERVICES | Facility: HOSPITAL | Age: 67
LOS: 1 days | End: 2022-06-23
Payer: MEDICARE

## 2022-06-23 VITALS — HEIGHT: 67 IN | BODY MASS INDEX: 19.62 KG/M2 | WEIGHT: 125 LBS

## 2022-06-23 DIAGNOSIS — Z98.890 OTHER SPECIFIED POSTPROCEDURAL STATES: Chronic | ICD-10-CM

## 2022-06-23 DIAGNOSIS — C71.9 MALIGNANT NEOPLASM OF BRAIN, UNSPECIFIED: ICD-10-CM

## 2022-06-23 PROCEDURE — A9585: CPT

## 2022-06-23 PROCEDURE — 70553 MRI BRAIN STEM W/O & W/DYE: CPT

## 2022-06-23 PROCEDURE — 99215 OFFICE O/P EST HI 40 MIN: CPT

## 2022-06-23 PROCEDURE — 70553 MRI BRAIN STEM W/O & W/DYE: CPT | Mod: 26

## 2022-06-24 NOTE — PHYSICAL EXAM
[General Appearance - Alert] : alert [General Appearance - In No Acute Distress] : in no acute distress [Affect] : the affect was normal [Mood] : the mood was normal [Person] : oriented to person [Place] : oriented to place [Remote Intact] : remote memory intact [Span Intact] : the attention span was normal [Concentration Intact] : normal concentrating ability [Visual Intact] : visual attention was ~T not ~L decreased [Naming Objects] : no difficulty naming common objects [Repeating Phrases] : no difficulty repeating a phrase [Fluency] : fluency intact [Comprehension] : comprehension intact [Cranial Nerves Optic (II)] : visual acuity intact bilaterally,  visual fields full to confrontation, pupils equal round and reactive to light [Cranial Nerves Oculomotor (III)] : extraocular motion intact [Cranial Nerves Trigeminal (V)] : facial sensation intact symmetrically [Cranial Nerves Facial (VII)] : face symmetrical [Cranial Nerves Vestibulocochlear (VIII)] : hearing was intact bilaterally [Cranial Nerves Glossopharyngeal (IX)] : tongue and palate midline [Cranial Nerves Accessory (XI - Cranial And Spinal)] : head turning and shoulder shrug symmetric [Cranial Nerves Hypoglossal (XII)] : there was no tongue deviation with protrusion [Motor Tone] : muscle tone was normal in all four extremities [Motor Strength] : muscle strength was normal in all four extremities [Involuntary Movements] : no involuntary movements were seen [No Muscle Atrophy] : normal bulk in all four extremities [Sensation Tactile Decrease] : light touch was intact [Sclera] : the sclera and conjunctiva were normal [Extraocular Movements] : extraocular movements were intact [Oropharynx] : the oropharynx was normal [Respiration, Rhythm And Depth] : normal respiratory rhythm and effort [Edema] : there was no peripheral edema [Skin Color & Pigmentation] : normal skin color and pigmentation [Time] : disoriented to time [Short Term Intact] : short term memory impaired [Dysdiadochokinesia Bilaterally] : not present [Coordination - Dysmetria Impaired Finger-to-Nose Bilateral] : not present [FreeTextEntry8] : very mild imbalance  [FreeTextEntry1] : tracheostomy

## 2022-06-24 NOTE — HISTORY OF PRESENT ILLNESS
[FreeTextEntry1] : 68 yo RH man with PMHx HTN, DM, glioblastoma (MGMT methylated), presents for follow-up. 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 Graham Bergeron [phone])\par 2/28/2022 - returned to [facility] ED from rehab with complaints of difficulty swallowing and vocal cord injury. notes indicate, "Had NGtube during his stay that removed before he was discharged. Since the removal he has been experiencing throat pain and upper chest pain. Seen by ENT outpt and was noted to have vocal cord swelling and or irritation and sent in for CT. Having difficulty swallowing. 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 \par He is accompanied by his daughter and son-in-law. \par He speaks some English and his daughter also translates for him. \par \par He had significant nausea and vomiting with chemo, as well as constipation. Nausea now resolved, constipation is improving. His appetite is poor. They have seen GI. \par He has occasional mild headaches. \par Gait is mildly unsteady. Some dizziness. \par Family notes confusion and short-term memory impairments. \par They had called re: potential nocturnal seizures - he is now on keppra 500mg BID - no further episodes of tongue-biting or falling at night, although they do note some twitching. \par He started CBD which is helping with his appetite and symptoms. \par \par He remains with tracheostomy and breathing is better. \par

## 2022-06-24 NOTE — DISCUSSION/SUMMARY
[FreeTextEntry1] : Brain tumor - Imaging reviewed, stable. Would continue with tmz cycle #1 to begin 6/27/22, dose 250mg (150mg/m2). We reviewed med administration, as well as ways to prevent and manage nausea and constipation. I asked them to only take Zofran as needed, as it can contribute to constipation. Encouraged plenty of fiber and fluid intake. \par \par Seizures - Continue Keppra and continue to monitor. \par \par DISPO - All questions answered. Follow-up visit in 1 month. MRI/visit in 2 months.

## 2022-06-25 NOTE — HISTORY OF PRESENT ILLNESS
[FreeTextEntry1] : Mr. JORDAN CHAKRABORTY is a 67 year old male who returns for endocrine evaluation with regard to a hx of type 2 dm. The diabetes has been present for about 1.5 years. Patient accompanied by daughter and son-in-law.  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 Was on chemotherapy and radiation. AS part of his chemo regimen, he was given glucocorticoids which did elevate his glucose.\par \[facility]  Will be meeting with oncologist on Thursday  for next round of chemotherapy. He is unsure if glucocorticoids will be part of his treatment regimen.\par \par He denies any history of retinopathy or nephropathy. With regard to neuropathy,he denies any specific diabetic neuropathy type s/s.\par  \par For the diabetes, he is currently taking Metformin 1000 mg bid, Lantus 10 HS ((but has not been using for the past month because BG's have not been above 125 at bedtime), and humalog  2-4 SS ( ss < 160 2, 161-200 3, 201-250 4). Per patient's daughter, have not been using for a while because BG's not above 160. Pt checks BG's at home 2-4 times daily.\par \par POCT glucose resulted today at 89 mg/dL fasting. \par POCT A1c resulted today at 5.6%, previously 6.6% in March. \par Patient has been off corticosteroids since February of this year.\par \par Does note about 40 lbs of weight loss. Patient has met with GI. Does have decreased appetite and gastroparesis due to brain tumor. Patient can only have soft foods or soup.  \par \par He denies polyuria, polydipsia, or any visual changes. He too denies any skin lesions, skin breakdown or non-healing areas of skin. He too denies any podiatric concerns. Ophthalmologic evaluation is up to date. Home glucose monitoring has shown values to be running on the low side of late running in the \par \par Additional medical history includes that of Cva about 10-15 years ago, hyponatremia, hypotension\par \par Doses have trach in place. Is able top swallow. PO intake is reduced from prior-mostly soft foods.\par Weight in 170'as pre ca dx, now 132 lbs\par \par Plan is for chemo and Rt to start apparently in next two weeks. \par \par FH DM2 Two SIblings\par \par Meds: Atorvastatin, Gabapentin, Escitalopram and Vimpat along with Melatonin 3 mg and Nacl tabs\par \par

## 2022-06-28 ENCOUNTER — APPOINTMENT (OUTPATIENT)
Dept: OTOLARYNGOLOGY | Facility: CLINIC | Age: 67
End: 2022-06-28

## 2022-06-28 VITALS
HEIGHT: 67 IN | WEIGHT: 125 LBS | HEART RATE: 94 BPM | TEMPERATURE: 208.4 F | SYSTOLIC BLOOD PRESSURE: 95 MMHG | DIASTOLIC BLOOD PRESSURE: 64 MMHG | BODY MASS INDEX: 19.62 KG/M2

## 2022-06-28 DIAGNOSIS — Z86.69 PERSONAL HISTORY OF OTHER DISEASES OF THE NERVOUS SYSTEM AND SENSE ORGANS: ICD-10-CM

## 2022-06-28 PROCEDURE — 31622 DX BRONCHOSCOPE/WASH: CPT

## 2022-06-28 PROCEDURE — 99214 OFFICE O/P EST MOD 30 MIN: CPT | Mod: 25

## 2022-06-28 RX ORDER — OMEPRAZOLE 20 MG/1
20 CAPSULE, DELAYED RELEASE ORAL
Qty: 30 | Refills: 2 | Status: COMPLETED | COMMUNITY
Start: 2022-05-03 | End: 2022-06-28

## 2022-06-28 RX ORDER — ATORVASTATIN CALCIUM 10 MG/1
10 TABLET, FILM COATED ORAL
Qty: 90 | Refills: 0 | Status: COMPLETED | COMMUNITY
Start: 2021-04-19 | End: 2022-06-28

## 2022-06-28 RX ORDER — LISINOPRIL 2.5 MG/1
2.5 TABLET ORAL
Qty: 90 | Refills: 0 | Status: COMPLETED | COMMUNITY
Start: 2021-04-19 | End: 2022-06-28

## 2022-06-28 RX ORDER — DEXAMETHASONE 2 MG/1
2 TABLET ORAL
Qty: 60 | Refills: 0 | Status: COMPLETED | COMMUNITY
Start: 2022-02-23 | End: 2022-06-28

## 2022-06-28 RX ORDER — LEVETIRACETAM 500 MG/1
500 TABLET, FILM COATED ORAL TWICE DAILY
Qty: 60 | Refills: 3 | Status: COMPLETED | COMMUNITY
Start: 2022-06-13 | End: 2022-06-28

## 2022-06-28 RX ORDER — TEMOZOLOMIDE 250 MG/1
250 CAPSULE ORAL
Qty: 5 | Refills: 0 | Status: COMPLETED | COMMUNITY
Start: 2022-03-09 | End: 2022-06-28

## 2022-06-28 RX ORDER — OMEPRAZOLE 40 MG/1
40 CAPSULE, DELAYED RELEASE ORAL
Qty: 180 | Refills: 3 | Status: COMPLETED | COMMUNITY
Start: 2022-06-21 | End: 2022-06-28

## 2022-06-28 NOTE — PROCEDURE
[FreeTextEntry1] : FOB, FFL [FreeTextEntry2] : Confirm placement of tracheostomy, chronic coughing [FreeTextEntry3] : After patient gave consent, a FFL was performed.  No lesions in the NPx, OPx, HPx or larynx.  The VC seemed paretic, R > L, with limited abduction.  A FOB was then performed and we replaced the trach in the site and confirmed placement.  No lesions in the trachea, no significant secretions.  \par \par Scope: 214\par

## 2022-06-28 NOTE — PHYSICAL EXAM
[de-identified] : Size 7 portex tube in place, no granulation, very loose - half the trach is sitting out - the straps were tightened.   [Midline] : trachea located in midline position [Laryngoscopy Performed] : laryngoscopy was performed, see procedure section for findings [Normal] : no rashes

## 2022-06-28 NOTE — REASON FOR VISIT
[Initial Evaluation] : an initial evaluation for [Family Member] : family member [Source: ______] : History obtained from [unfilled] [FreeTextEntry2] : trach care

## 2022-06-28 NOTE — HISTORY OF PRESENT ILLNESS
[de-identified] : 67 year old male presents for an initial evaluation for tracheostomy. 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  RT completed on 5/31/22 pending start of chemotherapy 7/1/22. Daughter states has had a cognitive decline since gliosarcoma surgery, very forgetful forgets he has trach constantly touches the trach, when he coughs he moves it, says its too tight, has coughed the entire trach out, coughing up clear mucus, coughing up blood at times, bleeding around the tracheostomy site at times, chills at times and has intermittent pressure on his head. Unsure when the trach was changed last.  Daughter denies foul smell around the trach, recent fevers, dysphagia, odynophagia, dyspnea, dysphonia, night sweats, fevers,or otalgia. Using a humidifier with distilled water to keep tracheostomy moist. Currently using omeprazole, and Flonase. \par Medical History: DM type 2, and seizures.

## 2022-06-30 ENCOUNTER — LABORATORY RESULT (OUTPATIENT)
Age: 67
End: 2022-06-30

## 2022-07-13 ENCOUNTER — APPOINTMENT (OUTPATIENT)
Dept: RADIATION ONCOLOGY | Facility: CLINIC | Age: 67
End: 2022-07-13

## 2022-07-15 ENCOUNTER — APPOINTMENT (OUTPATIENT)
Dept: ENDOCRINOLOGY | Facility: CLINIC | Age: 67
End: 2022-07-15

## 2022-07-26 ENCOUNTER — APPOINTMENT (OUTPATIENT)
Dept: OTOLARYNGOLOGY | Facility: CLINIC | Age: 67
End: 2022-07-26

## 2022-07-26 VITALS
HEART RATE: 92 BPM | SYSTOLIC BLOOD PRESSURE: 100 MMHG | DIASTOLIC BLOOD PRESSURE: 67 MMHG | OXYGEN SATURATION: 96 % | BODY MASS INDEX: 20.52 KG/M2 | WEIGHT: 130.73 LBS | HEIGHT: 67 IN

## 2022-07-26 PROCEDURE — 99214 OFFICE O/P EST MOD 30 MIN: CPT | Mod: 25

## 2022-07-26 PROCEDURE — 31622 DX BRONCHOSCOPE/WASH: CPT

## 2022-07-26 NOTE — HISTORY OF PRESENT ILLNESS
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  RT completed on 5/31/22 Started chemo 7/5/22 5 pills per month for 12 months. \par \par Presents for follow up evaluation of tracheostomy. Daughter states since trach was adjusted at last visit with improvement- more comfortable for him. Trach no longer moving in and out when he coughs. Cough still present but improving/ Breathing well and speaking better. Patient is occasionally forgetful that trach is in place - family is frequently reminding him. Reports small amount of blood in mucus when he coughs. No bleeding around the stoma. Daughter denies foul smell around the trach, recent fevers, dysphagia, odynophagia, dyspnea, dysphonia and night sweats. Eating and drinking without difficulty. Occasional tinnitus, no noticeable hearing loss since completion of radiation. Continues use of humidifier with distilled water to keep tracheostomy moist. Currently using Omeprazole, and Flonase. \par \par Medical History: DM type 2, and seizures.

## 2022-07-26 NOTE — REASON FOR VISIT
[Family Member] : family member [Source: ______] : History obtained from [unfilled] [Subsequent Evaluation] : a subsequent evaluation for [FreeTextEntry2] : trach care

## 2022-07-26 NOTE — PHYSICAL EXAM
[Midline] : trachea located in midline position [Laryngoscopy Performed] : laryngoscopy was performed, see procedure section for findings [Normal] : no rashes [de-identified] : Size 7 portex tube in place, no granulation, very loose - half the trach is sitting out - the straps were tightened.

## 2022-07-26 NOTE — PROCEDURE
[FreeTextEntry1] : Trach change, FOB, FFL. [FreeTextEntry2] : Confirm placement of tracheostomy, chronic coughing, stomal granulation [FreeTextEntry3] : After patient gave consent, a FFL was performed. No lesions in the NPx, OPx, HPx or larynx. The VC seemed paretic, R > L, with limited abduction. A FOB was then performed and we replaced the trach in the site and confirmed placement and there is some mild granulation at the stomal site. No lesions in the trachea, no significant secretions. \par \par Scope: 216

## 2022-08-03 ENCOUNTER — RESULT REVIEW (OUTPATIENT)
Age: 67
End: 2022-08-03

## 2022-08-03 ENCOUNTER — APPOINTMENT (OUTPATIENT)
Dept: NEUROLOGY | Facility: CLINIC | Age: 67
End: 2022-08-03

## 2022-08-03 ENCOUNTER — OUTPATIENT (OUTPATIENT)
Dept: OUTPATIENT SERVICES | Facility: HOSPITAL | Age: 67
LOS: 1 days | Discharge: HOME | End: 2022-08-03

## 2022-08-03 ENCOUNTER — APPOINTMENT (OUTPATIENT)
Dept: HEMATOLOGY ONCOLOGY | Facility: CLINIC | Age: 67
End: 2022-08-03

## 2022-08-03 VITALS
HEIGHT: 67 IN | BODY MASS INDEX: 20.56 KG/M2 | RESPIRATION RATE: 16 BRPM | OXYGEN SATURATION: 95 % | HEART RATE: 92 BPM | WEIGHT: 131 LBS

## 2022-08-03 VITALS — SYSTOLIC BLOOD PRESSURE: 102 MMHG | DIASTOLIC BLOOD PRESSURE: 60 MMHG

## 2022-08-03 DIAGNOSIS — Z98.890 OTHER SPECIFIED POSTPROCEDURAL STATES: Chronic | ICD-10-CM

## 2022-08-03 DIAGNOSIS — C71.9 MALIGNANT NEOPLASM OF BRAIN, UNSPECIFIED: ICD-10-CM

## 2022-08-03 LAB
ALBUMIN SERPL ELPH-MCNC: 3.5 G/DL
ALP BLD-CCNC: 77 U/L
ALT SERPL-CCNC: 16 U/L
ANION GAP SERPL CALC-SCNC: 9 MMOL/L
AST SERPL-CCNC: 17 U/L
BASOPHILS # BLD AUTO: 0.06 K/UL — SIGNIFICANT CHANGE UP (ref 0–0.2)
BASOPHILS NFR BLD AUTO: 1 % — SIGNIFICANT CHANGE UP (ref 0–2)
BILIRUB SERPL-MCNC: 0.4 MG/DL
BUN SERPL-MCNC: 7 MG/DL
CALCIUM SERPL-MCNC: 9 MG/DL
CHLORIDE SERPL-SCNC: 99 MMOL/L
CO2 SERPL-SCNC: 27 MMOL/L
CREAT SERPL-MCNC: 0.86 MG/DL
EGFR: 96 ML/MIN/1.73M2
EOSINOPHIL # BLD AUTO: 1.02 K/UL — HIGH (ref 0–0.5)
EOSINOPHIL NFR BLD AUTO: 17.6 % — HIGH (ref 0–6)
GLUCOSE SERPL-MCNC: 165 MG/DL
HCT VFR BLD CALC: 37.1 % — LOW (ref 39–50)
HGB BLD-MCNC: 12.5 G/DL — LOW (ref 13–17)
IMM GRANULOCYTES NFR BLD AUTO: 0.3 % — SIGNIFICANT CHANGE UP (ref 0–1.5)
LYMPHOCYTES # BLD AUTO: 1.15 K/UL — SIGNIFICANT CHANGE UP (ref 1–3.3)
LYMPHOCYTES # BLD AUTO: 19.8 % — SIGNIFICANT CHANGE UP (ref 13–44)
MCHC RBC-ENTMCNC: 33 PG — SIGNIFICANT CHANGE UP (ref 27–34)
MCHC RBC-ENTMCNC: 33.7 G/DL — SIGNIFICANT CHANGE UP (ref 32–36)
MCV RBC AUTO: 97.6 FL — SIGNIFICANT CHANGE UP (ref 80–100)
MONOCYTES # BLD AUTO: 0.48 K/UL — SIGNIFICANT CHANGE UP (ref 0–0.9)
MONOCYTES NFR BLD AUTO: 8.3 % — SIGNIFICANT CHANGE UP (ref 2–14)
NEUTROPHILS # BLD AUTO: 3.07 K/UL — SIGNIFICANT CHANGE UP (ref 1.8–7.4)
NEUTROPHILS NFR BLD AUTO: 53 % — SIGNIFICANT CHANGE UP (ref 43–77)
NRBC # BLD: 0 /100 WBCS — SIGNIFICANT CHANGE UP (ref 0–0)
PLATELET # BLD AUTO: 116 K/UL — LOW (ref 150–400)
POTASSIUM SERPL-SCNC: 4.8 MMOL/L
PROT SERPL-MCNC: 5.8 G/DL
RBC # BLD: 3.79 M/UL — LOW (ref 4.2–5.8)
RBC # FLD: 14 % — SIGNIFICANT CHANGE UP (ref 10.3–14.5)
SODIUM SERPL-SCNC: 135 MMOL/L
WBC # BLD: 5.8 K/UL — SIGNIFICANT CHANGE UP (ref 3.8–10.5)
WBC # FLD AUTO: 5.8 K/UL — SIGNIFICANT CHANGE UP (ref 3.8–10.5)

## 2022-08-03 PROCEDURE — 99214 OFFICE O/P EST MOD 30 MIN: CPT

## 2022-08-03 RX ORDER — TEMOZOLOMIDE 140 MG/1
140 CAPSULE ORAL
Qty: 21 | Refills: 0 | Status: DISCONTINUED | COMMUNITY
Start: 2022-05-02

## 2022-08-03 NOTE — DISCUSSION/SUMMARY
[FreeTextEntry1] : Brain tumor - He is doing well clinically, improving over time. We discussed better prognosis associated with his methylation status. Continue with tmz cycle #2 to begin as soon as they receive the meds, dose 340mg (200mg/m2). We reviewed ways to prevent and manage constipation. \par Check labs today and q2 weeks. \par \par Seizures - I watched the video of his fall and it does not appear to be a seizure. Will continue Keppra. \par \par DISPO - All questions answered. Follow-up visit with MRI next week. \par \par

## 2022-08-03 NOTE — PHYSICAL EXAM
[General Appearance - Alert] : alert [General Appearance - In No Acute Distress] : in no acute distress [Affect] : the affect was normal [Mood] : the mood was normal [Person] : oriented to person [Place] : oriented to place [Time] : oriented to time [Remote Intact] : remote memory intact [Span Intact] : the attention span was normal [Concentration Intact] : normal concentrating ability [Visual Intact] : visual attention was ~T not ~L decreased [Naming Objects] : no difficulty naming common objects [Repeating Phrases] : no difficulty repeating a phrase [Fluency] : fluency intact [Comprehension] : comprehension intact [Cranial Nerves Optic (II)] : visual acuity intact bilaterally,  visual fields full to confrontation, pupils equal round and reactive to light [Cranial Nerves Oculomotor (III)] : extraocular motion intact [Cranial Nerves Trigeminal (V)] : facial sensation intact symmetrically [Cranial Nerves Facial (VII)] : face symmetrical [Cranial Nerves Vestibulocochlear (VIII)] : hearing was intact bilaterally [Cranial Nerves Glossopharyngeal (IX)] : tongue and palate midline [Cranial Nerves Accessory (XI - Cranial And Spinal)] : head turning and shoulder shrug symmetric [Cranial Nerves Hypoglossal (XII)] : there was no tongue deviation with protrusion [Motor Tone] : muscle tone was normal in all four extremities [Motor Strength] : muscle strength was normal in all four extremities [Involuntary Movements] : no involuntary movements were seen [No Muscle Atrophy] : normal bulk in all four extremities [Sensation Tactile Decrease] : light touch was intact [Sclera] : the sclera and conjunctiva were normal [Extraocular Movements] : extraocular movements were intact [Oropharynx] : the oropharynx was normal [Respiration, Rhythm And Depth] : normal respiratory rhythm and effort [Edema] : there was no peripheral edema [Skin Color & Pigmentation] : normal skin color and pigmentation [] : no rash [Short Term Intact] : short term memory impaired [Dysdiadochokinesia Bilaterally] : not present [Coordination - Dysmetria Impaired Finger-to-Nose Bilateral] : not present [FreeTextEntry8] : very mild imbalance - improved since last visit  [FreeTextEntry1] : tracheostomy

## 2022-08-03 NOTE — HISTORY OF PRESENT ILLNESS
[FreeTextEntry1] : 66 yo RH man with PMHx HTN, DM, glioblastoma (MGMT methylated), presents for follow-up. 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 Graham Bergeron [phone])\par 2/28/2022 - returned to [facility] ED from rehab with complaints of difficulty swallowing and vocal cord injury. notes indicate, "Had NGtube during his stay that removed before he was discharged. Since the removal he has been experiencing throat pain and upper chest pain. Seen by ENT outpt and was noted to have vocal cord swelling and or irritation and sent in for CT. Having difficulty swallowing. 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  \par He is accompanied by his daughter and son-in-law. \par He speaks some English and his daughter also translates for him. \par \par He tolerated the chemo cycle well, other than severe constipation. \par He is otherwise doing well, with no n/v, no headaches. Appetite has improved. \par Gait is steadier. \par Family is happy with his progress. \par \par He remains on Keppra and they noted one fall at night, which they have on video. \par \par He remains with tracheostomy, which is permanent. \par

## 2022-08-11 ENCOUNTER — APPOINTMENT (OUTPATIENT)
Dept: MRI IMAGING | Facility: IMAGING CENTER | Age: 67
End: 2022-08-11

## 2022-08-11 ENCOUNTER — APPOINTMENT (OUTPATIENT)
Dept: NEUROLOGY | Facility: CLINIC | Age: 67
End: 2022-08-11

## 2022-08-11 ENCOUNTER — OUTPATIENT (OUTPATIENT)
Dept: OUTPATIENT SERVICES | Facility: HOSPITAL | Age: 67
LOS: 1 days | End: 2022-08-11
Payer: MEDICARE

## 2022-08-11 DIAGNOSIS — Z98.890 OTHER SPECIFIED POSTPROCEDURAL STATES: Chronic | ICD-10-CM

## 2022-08-11 DIAGNOSIS — C71.9 MALIGNANT NEOPLASM OF BRAIN, UNSPECIFIED: ICD-10-CM

## 2022-08-11 PROCEDURE — 99214 OFFICE O/P EST MOD 30 MIN: CPT

## 2022-08-11 PROCEDURE — 70553 MRI BRAIN STEM W/O & W/DYE: CPT | Mod: 26

## 2022-08-11 PROCEDURE — A9585: CPT

## 2022-08-11 PROCEDURE — 70553 MRI BRAIN STEM W/O & W/DYE: CPT

## 2022-08-15 NOTE — VITALS
[Least Pain Intensity: 0/10] : 0/10 [Pain Location: ___] : Pain Location: [unfilled] [80: Normal activity with effort; some signs or symptoms of disease.] : 80: Normal activity with effort; some signs or symptoms of disease.  [ECOG Performance Status: 1 - Restricted in physically strenuous activity but ambulatory and able to carry out work of a light or sedentary nature] : Performance Status: 1 - Restricted in physically strenuous activity but ambulatory and able to carry out work of a light or sedentary nature, e.g., light house work, office work

## 2022-08-15 NOTE — REVIEW OF SYSTEMS
[Fatigue] : fatigue [Visual Disturbances] : visual disturbances [Dysphagia] : dysphagia [Skin Wound] : skin wound [Depression] : depression [Negative] : Neurological [Blurred Vision: Grade 0] : Blurred Vision: Grade 0 [Headache: Grade 0] : Headache: Grade 0 [Peripheral Sensory Neuropathy: Grade 0] : Peripheral Sensory Neuropathy: Grade 0 [Shortness Of Breath] : no shortness of breath [Cough] : no cough [Abdominal Pain] : no abdominal pain [Vomiting] : no vomiting [Confused] : no confusion [Dizziness] : no dizziness [Fainting] : no fainting [Difficulty Walking] : no difficulty walking [FreeTextEntry6] : cough improved since changing trach [FreeTextEntry7] : gets constipated when he takes chemo [FreeTextEntry8] : sometimes has to push when urinating [de-identified] : family believes he had a seizure on 7/15

## 2022-08-15 NOTE — REASON FOR VISIT
[Brain Tumor] : brain tumor [Spouse] : spouse [Family Member] : family member [[employer] ] : provided by [employer]   [Interpreters_IDNumber] : [id number] [Interpreters_FullName] : Meaghan [TWNoteComboBox1] : [place]

## 2022-08-15 NOTE — HISTORY OF PRESENT ILLNESS
[Home] : at home, [unfilled] , at the time of the visit. [Medical Office: ([facility])___] : at the medical office located in  [Verbal consent obtained from patient] : the patient, [unfilled] [FreeTextEntry1] : Mr. 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 [place] returned to [place] and 1/24 presented to [facility] with c/o confused speech and noted to responding by answering his name to all questions.\par \par 1/24/2022 CT Brain noted extensive lesion and surrounding vasogenic edema in left frontal and temporal lobes with involvement of the corpus callosum and medial left frontal lobe with marked ventricular effacement and 1.4 cm left to right midline shift.\par \par 1/25/2022 MRI Brain noted evidence of mass lesion in the left frontal lobe with significant mass effect on the left lateral ventricle and midline shift. Surrounding vasogenic edema. Subfalcine herniation. Suspicion of possible invasion of the corpus callosum. \par \par 1/27/2022 - he underwent left frontal craniotomy and resection of left frontal enhancing tumor and Gleolan placement by / Candy. Pathology revealed Gliosarcoma,  IDH-1 negative.\par \par 2/28/2022 - He returned to [facility] ED with complaints of difficulty swallowing and vocal cord injury. and 3/2/2022 - he underwent tracheal stenosis, bronchial lavage and trachea biopsy, negative for malignancy.\par \par He followed up with Dr. Gupta and Dr. Hill with discussion of starting adjuvant therapy, RT and possible Optune placement.\par \par 3/31/2022- Presented to Dr. Guevara today to discuss treatment. Recovering well from his surgery. Notes generalized weakness, fatigue, blurred vision, and occasional neuropathy in feet since surgery. Tracheostomy appears clean and dry; he reports cough, mild 4/10 throat pain, tolerating soft diet. Reports occasional HA (worse in evening) that resolves with Tylenol. Working with PT. When asked about emotional distress, pt appeared to struggle with disclosure. Daughter notes he recently started on Escitalopram 5 mg.\par \par 8/15/2022- Presents today for post treatment evaluation. He is seen through TELEPHONE for which he provides verbal consent on 8/15/2022 at 1:10 PM. pacific  offered, patient refuses. has continued to follow with Dr. Hill and Dr. Caba. has done 2 cycles of adjuvant temodar.\par \par MRI brain 6/23 showed \par Resolution of postsurgical changes now with nodular enhancement along the medial aspect of the postsurgical cavity which may represent posttreatment change versus recurrent neoplasm. Resolution of midline shift and mass effect upon the left frontal horn. Perfusion data was unable to be post processed due to technical factors.\par \par [facility] MRI 8/11 without final read but appears stable with improved T2. \par \par Feeling well overall. denies headaches, focal weakness, confusion. his family thinks he had a nocturnal seizure on 7/15. gets constipated when he takes chemo.\par \par Family thought he had one seizure in july and he was started on keppra\par notes some redness to head post-RT which has now resolved

## 2022-08-15 NOTE — HISTORY OF PRESENT ILLNESS
[FreeTextEntry1] : 68 yo RH man with PMHx HTN, DM, glioblastoma (MGMT methylated), presents for follow-up. 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 Graham Bergeron [phone])\par 2/28/2022 - returned to [facility] ED from rehab with complaints of difficulty swallowing and vocal cord injury. notes indicate, "Had NGtube during his stay that removed before he was discharged. Since the removal he has been experiencing throat pain and upper chest pain. Seen by ENT outpt and was noted to have vocal cord swelling and or irritation and sent in for CT. Having difficulty swallowing. 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 He recently started tmz cycle #2. \par \par He is accompanied by his daughter and son-in-law. \par He speaks some English and his daughter also translates for him. \par \par He tolerated the chemo cycle well, other than severe constipation. \par He is otherwise doing well, with no n/v, no headaches. Appetite has improved. \par Gait is steadier. \par Family is happy with his progress. \par \par He remains on Keppra and they noted one fall at night, which they have on video. \par \par He remains with tracheostomy, which is permanent. \par \par

## 2022-08-15 NOTE — DATA REVIEWED
[de-identified] : I personally reviewed MR imaging dated\par 4/1/22	6/23/22	8/11/22	\par \par In reviewing these images, I find LEFT ANTERIOR FRONTAL HYPERINTENSITY on the T2 weighted images, with signal change likely representing an admixture of treatment effects, cerebral edema, or low grade neoplasm. \par DWI sequences disclose no restriction.\par On the contrast enhanced images, there is NO NEOPLASTIC enhancement.\par Overall I find the images to be stable. \par Selected imaging was provided to the patient, along with a detailed verbal explanation of the issues at hand as outlined above.\par

## 2022-08-15 NOTE — DISCUSSION/SUMMARY
[FreeTextEntry1] : BRAIN TUMOR -- I recommend continuing with temozolomide for 12 cycles total, given the MGMT methylation. We discussed this in detail. It is reassuring that there has been no change on recent imaging.\par \par TRAVEL - They ask about travelling and I reviewed the need for local labwork and ability to return rapidly if needed. It may make more sense to defer travel until after 12 cycles of temozolomide is completed.\par \par DISPO -- To return in one month for routine follow-up and in two months with repeat imaging.

## 2022-08-15 NOTE — PHYSICAL EXAM
[General Appearance - Alert] : alert [General Appearance - In No Acute Distress] : in no acute distress [Oriented To Time, Place, And Person] : oriented to person, place, and time [Impaired Insight] : insight and judgment were intact [Affect] : the affect was normal [Person] : oriented to person [Place] : oriented to place [Time] : oriented to time [Concentration Intact] : normal concentrating ability [Visual Intact] : visual attention was ~T not ~L decreased [Naming Objects] : no difficulty naming common objects [Repeating Phrases] : no difficulty repeating a phrase [Writing A Sentence] : no difficulty writing a sentence [Fluency] : fluency intact [Comprehension] : comprehension intact [Reading] : reading intact [Past History] : adequate knowledge of personal past history [Cranial Nerves Optic (II)] : visual acuity intact bilaterally,  visual fields full to confrontation, pupils equal round and reactive to light [Cranial Nerves Oculomotor (III)] : extraocular motion intact [Cranial Nerves Trigeminal (V)] : facial sensation intact symmetrically [Cranial Nerves Facial (VII)] : face symmetrical [Cranial Nerves Vestibulocochlear (VIII)] : hearing was intact bilaterally [Cranial Nerves Glossopharyngeal (IX)] : tongue and palate midline [Cranial Nerves Accessory (XI - Cranial And Spinal)] : head turning and shoulder shrug symmetric [Cranial Nerves Hypoglossal (XII)] : there was no tongue deviation with protrusion [Motor Tone] : muscle tone was normal in all four extremities [Motor Strength] : muscle strength was normal in all four extremities [No Muscle Atrophy] : normal bulk in all four extremities [Motor Handedness Right-Handed] : the patient is right hand dominant [Sensation Tactile Decrease] : light touch was intact [Abnormal Walk] : normal gait [Balance] : balance was intact [1+] : Ankle jerk left 1+ [FreeTextEntry1] : TRACH TUBE IN PLACE. [Paresis Pronator Drift Right-Sided] : no pronator drift on the right [Paresis Pronator Drift Left-Sided] : no pronator drift on the left [Motor Strength Upper Extremities Bilaterally] : strength was normal in both upper extremities [Motor Strength Lower Extremities Bilaterally] : strength was normal in both lower extremities [Past-pointing] : there was no past-pointing [Tremor] : no tremor present [Plantar Reflex Right Only] : normal on the right [Plantar Reflex Left Only] : normal on the left

## 2022-09-06 NOTE — HISTORY OF PRESENT ILLNESS
[FreeTextEntry1] : 68 yo RH man with PMHx HTN, DM, glioblastoma (MGMT methylated), presents for follow-up. 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 Graham Bergeron [phone])\par 2/28/2022 - returned to [facility] ED from rehab with complaints of difficulty swallowing and vocal cord injury. notes indicate, "Had NGtube during his stay that removed before he was discharged. Since the removal he has been experiencing throat pain and upper chest pain. Seen by ENT outpt and was noted to have vocal cord swelling and or irritation and sent in for CT. Having difficulty swallowing. 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 Daughter reports overall he is feeling ok, mild headaches towards the end of the day , will have a good night sleep and morning and during the day he feels fine.\par \par Occasional twitching of left ue - hand - Keppra started on 6/13 - episodes reduced in frequency but still happening- daughter showed me a video from 8/19 - appeared to behaving rhythmic movements of right UE. Patient himself is unable to tell me whether he is aware of these events. Daughter reports compliance with medications.

## 2022-09-06 NOTE — PHYSICAL EXAM
[General Appearance - Alert] : alert [General Appearance - In No Acute Distress] : in no acute distress [General Appearance - Well Nourished] : well nourished [Oriented To Time, Place, And Person] : oriented to person, place, and time [Impaired Insight] : insight and judgment were intact [Affect] : the affect was normal [Mood] : the mood was normal [] : no respiratory distress [Respiration, Rhythm And Depth] : normal respiratory rhythm and effort [Exaggerated Use Of Accessory Muscles For Inspiration] : no accessory muscle use [Edema] : there was no peripheral edema [FreeTextEntry1] : + TRACH COLLAR

## 2022-09-06 NOTE — DISCUSSION/SUMMARY
[FreeTextEntry1] : Patient seen and examined\par GLIOSARCOMA - Neurologically stable. CBC, CMP today, Weekly CBC thereafter\par Once BW reviewed, patient could start TMZ 3rd cycle\par SEIZUREs - RUE shaking - Last episode on 8/16/2022 - Will raise Keppra to 750 mg bid\par FOLLOW UP- Will set up a follow up in a month along with an MRI ( Oct 6, 2022). In the interim, if any concerns patient knows to call our office

## 2022-09-13 PROBLEM — D50.9 IRON DEFICIENCY ANEMIA: Status: ACTIVE | Noted: 2022-05-10

## 2022-09-13 NOTE — REVIEW OF SYSTEMS
[Negative] : Heme/Lymph [Blurry Vision] : blurred vision [Constipation] : constipation [Feeling Fatigued] : feeling fatigued [Eye Pain] : no eye pain

## 2022-09-13 NOTE — HISTORY OF PRESENT ILLNESS
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 \par  who presents to the office for follow up. Pt was last seen in the office on 6/9/22 with hypotension and was sent to ED where he was given fluids and discharged. Daughter states that his platelets were low, he will be getting another blood test today to determine if he could start chemo. Pt reports   vision changes and cannot see well. pt with ocassional fatigue

## 2022-10-06 NOTE — PHYSICAL EXAM
[FreeTextEntry1] : AOx3\par PERRL, EOMI, CN 2-12 intact\par LE and UE 5/5 b/l w/o pronator drift \par Normal gait\par Bicoronal incision well healed w/o erythema or skin breakdown.

## 2022-10-06 NOTE — REASON FOR VISIT
[Follow-Up: _____] : a [unfilled] follow-up visit [FreeTextEntry1] : JORDAN CHAKRABORTY is a 67 year old male being seen for a follow-up visit. \par Patient accompanied by family member. 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 His post-operative course was complicated by subglottal stenosis for which he underwent tracheostomy.\par \par Last set of imaging in August showed no tumor recurrence. F/u MRI in two weeks with appointment with Dr. Mackenzie. He is still complaining of L frontal headache daily. Intermittent episodes of swelling in area exposed to radiation. Worsening short term memory loss. Patient is able to ambulate, performs ADLs with supervision. Family is arranging for home health aide.

## 2022-10-06 NOTE — REVIEW OF SYSTEMS
[Confused or Disoriented] : no confusion [Memory Lapses or Loss] : memory loss [Decr. Concentrating Ability] : no decrease in concentrating ability [Difficulty with Language] : no ~M difficulty with language [Facial Weakness] : no facial weakness [Arm Weakness] : no arm weakness [Hand Weakness] : no hand weakness [Leg Weakness] : no leg weakness [Poor Coordination] : good coordination

## 2022-10-17 NOTE — HISTORY OF PRESENT ILLNESS
[FreeTextEntry1] : Mr. JORDAN CHAKRABORTY is a 67 year old male who returns for endocrine evaluation with regard to a hx of type 2 dm. The diabetes has been present for about 1.5 years. Patient accompanied by daughter and son-in-law. 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 Tumor has not returned since January 2022.He too had developed Tracheal stenosis and is s/p recent Tracheal balloon dilation on 3/2/22 and tracheostomy on 3/17/22- will be meeting with ENT at the end of June. Was on chemotherapy and radiation. As part of his chemo regimen, he was given glucocorticoids which did elevate his glucose. \par He denies any history of retinopathy or nephropathy. With regard to neuropathy,he denies any specific diabetic neuropathy type s/s.\par  \par For the diabetes, he is currently taking Metformin 1000 mg bid, Lantus 10 HS ((but has not been using for the past month because BG's have not been above 125 at bedtime), and humalog 2-4 SS ( ss < 160 2, 161-200 3, 201-250 4). Per patient's daughter, have not been using for a while because BG's not above 160. Pt checks BG's at home 2-4 times daily.\par \par \par HGM has shown to be running in the \par A1c resulted  at 5.9 % on 9/13/22, previously 5.6% in June 2022. \par Patient has been off corticosteroids since February of this year.\par \par Does note about 40 lbs of weight loss. Patient has met with GI. Does have decreased appetite and gastroparesis due to brain tumor. Patient can only have soft foods or soup. \par \par He denies polyuria, polydipsia, or any visual changes. He too denies any skin lesions, skin breakdown or non-healing areas of skin. He too denies any podiatric concerns. Ophthalmologic evaluation is up to date. Home glucose monitoring has shown values to be running on the low side of late running in the \par \par Additional medical history includes that of Cva about 10-15 years ago, hyponatremia, hypotension\par \par Doses have trach in place. Is able to swallow. PO intake is reduced from prior-mostly soft foods.\par Weight in 170'as pre ca dx, now 148 lbs\par \par FH DM2 Two SIblings\par \par Meds: Atorvastatin, Gabapentin, Escitalopram and Vimpat along with Melatonin 3 mg and Nacl tabs

## 2022-10-19 NOTE — HISTORY OF PRESENT ILLNESS
[FreeTextEntry1] : 66 yo RH man with PMHx HTN, DM, glioblastoma (MGMT methylated), presents for follow-up. 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 Graham Bergeron [phone])\par 2/28/2022 - returned to [facility] ED from rehab with complaints of difficulty swallowing and vocal cord injury. notes indicate, "Had NGtube during his stay that removed before he was discharged. Since the removal he has been experiencing throat pain and upper chest pain. Seen by ENT outpt and was noted to have vocal cord swelling and or irritation and sent in for CT. Having difficulty swallowing. 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.\par PATH: granulation, inflammation, no carcinoma.\par 3/26/22 - seen by Endocrine (IVY)\par 3/31/22 - seen by rad onc (CLAYTON) on lexapro 5mg daily.\par 4/4/22 - seen by surgery \par 4/18/22 - 5/30/22 - completed 6-week course of chemoRT\par 7/5/22 - tmz cycle #1, dose 250mg (150mg/m2) \par  8/6/22 - tmz cycle #2, dose 340mg (200mg/m2) \par 8/11/2022 - MRI stable \par 9/6/2022 - Reports Occasional twitching of left ue - hand - Keppra started on 6/13 - episodes reduced in frequency but still happening- daughter showed me a video from 8/19 - appeared to behaving rhythmic movements of right UE. Patient himself is unable to tell me whether he is aware of these events. Daughter reports compliance with medications. Raised Keppra to 750 mg bid\par 9/30/2022- 10/3- TMZ # 3 ( 340 MG x 4 DAYS sec to Thrombocytopenia)\par 10/19/2022 - Here for a follow up along with a new MRI.\par \par He offers no new complaints - hand shaking has resolved on higher dose of Keppra - they report good compliance. He notes increased energy. He denies any focal weakness, numbness, or gait change.\par

## 2022-10-19 NOTE — PHYSICAL EXAM
[General Appearance - Alert] : alert [General Appearance - In No Acute Distress] : in no acute distress [Affect] : the affect was normal [Mood] : the mood was normal [] : no respiratory distress [Respiration, Rhythm And Depth] : normal respiratory rhythm and effort [Exaggerated Use Of Accessory Muscles For Inspiration] : no accessory muscle use [Edema] : there was no peripheral edema [FreeTextEntry1] : + TRACH COLLAR

## 2022-10-19 NOTE — DISCUSSION/SUMMARY
[FreeTextEntry1] : Patient seen and examined\par GLIOSARCOMA - Neurologically stable. MRI from today reviewed ,left frontal area appears stable to my review. MRI without any new enhancement, or flair changes\par CBC, CMP today, Weekly CBC thereafter\par Once BW reviewed, Will order TMZ 4th cycle ( 340 mg X 4 days)- Due to start on 10/28/2022\par SEIZURES - Since raising Keppra family did not notice anymore RUE shaking.  Continue Keppra  750 mg bid\par FOLLOW UP- Will set up a follow up in a month.  In the interim, if any concerns patient knows to call our office.

## 2022-11-15 NOTE — DISCUSSION/SUMMARY
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 Completed 6 weeks of RT with Temodar and, thus far, 4 cycles of adjuvant Temodar. He is doing well. Seizures under control and neurologically stable.\par Will check CBC and CMP today.\par He is due for Cycle #5 11/25 - pending review of blood work.\par Should follow up 1st week of January with MRI.

## 2022-11-15 NOTE — HISTORY OF PRESENT ILLNESS
[FreeTextEntry1] : 68 yo RH man with PMHx HTN, DM, glioblastoma (MGMT methylated), presents for follow-up. 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 Graham Bergeron [phone])\par 2/28/2022 - returned to [facility] ED from rehab with complaints of difficulty swallowing and vocal cord injury. notes indicate, "Had NGtube during his stay that removed before he was discharged. Since the removal he has been experiencing throat pain and upper chest pain. Seen by ENT outpt and was noted to have vocal cord swelling and or irritation and sent in for CT. Having difficulty swallowing. 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.\par PATH: granulation, inflammation, no carcinoma.\par 3/26/22 - seen by Endocrine (IVY)\par 3/31/22 - seen by rad onc (CLAYTON) on lexapro 5mg daily.\par 4/4/22 - seen by surgery \par 4/18/22 - 5/30/22 - completed 6-week course of chemoRT\par 7/5/22 - tmz cycle #1, dose 250mg (150mg/m2) \par  8/6/22 - tmz cycle #2, dose 340mg (200mg/m2) \par 8/11/2022 - MRI stable \par 9/6/2022 - Reports Occasional twitching of left ue - hand - Keppra started on 6/13 - episodes reduced in frequency but still happening- daughter showed me a video from 8/19 - appeared to behaving rhythmic movements of right UE. Patient himself is unable to tell me whether he is aware of these events. Daughter reports compliance with medications. Raised Keppra to 750 mg bid\par 9/30/2022- 10/3- TMZ # 3 ( 340 MG x 4 DAYS sec to Thrombocytopenia)\par 10/19/2022 - Here for a follow up along with a new MRI.\par \par He offers no new complaints - hand shaking has resolved on higher dose of Keppra - they report good compliance. He notes increased energy. He denies any focal weakness, numbness, or gait change.\par Daughter feels he is doing better - increased independence.

## 2022-11-15 NOTE — PHYSICAL EXAM
[FreeTextEntry1] : Weight 152 lbs.\par He is awake and alert - oriented to month, year, and president.\par Speech is fluent with normal reception\par EOMI, VFF\par Face symmetric - tongue midline\par Trach collar is in place\par No noted drift\par UE strength full and symmetric - FFM intact\par SABRINA and FNF intact bilaterally\par LE strong - ambulates independently and steadily.\par No LE edema noted.

## 2022-11-16 NOTE — H&P ADULT - NSICDXFAMILYHX_GEN_ALL_CORE_FT
FAMILY HISTORY:  No pertinent family history in first degree relatives     I (Nasir) agree with above, I performed a history and physical. Counseled nicole medical staff, physician assistant, and/or medical student on medical decision making as documented. Medical decisions and treatment interventions were made in real time during the patient encounter. Additionally and/or with the following exceptions: Patient is a 39-year-old past medical history of known cholelithiasis who is presenting to the emergency department with acute onset severe epigastric pain with radiation to the back.  Does not feel as a burning.  Pain is excruciating, took ibuprofen 800 mg without resolution of symptoms.  Also associated with nonbloody vomiting.  Has a history of laparoscopic to rule out endometriosis, which was ruled out, patient moving bowels and gas normally.  She is uncomfortable appearing, morphine ordered.  Has tenderness to palpation in the right upper quadrant/epigastrium.  Suspect cholecystitis, will pain ultrasound imaging.  Obtain EKG, basic labs will reassess patient.

## 2022-12-20 NOTE — HISTORY OF PRESENT ILLNESS
[FreeTextEntry1] : 68 yo RH man with PMHx HTN, DM, glioblastoma (MGMT methylated), presents for follow-up. 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 Graham Bergeron [phone])\par 2/28/2022 - returned to [facility] ED from rehab with complaints of difficulty swallowing and vocal cord injury. notes indicate, "Had NGtube during his stay that removed before he was discharged. Since the removal he has been experiencing throat pain and upper chest pain. Seen by ENT outpt and was noted to have vocal cord swelling and or irritation and sent in for CT. Having difficulty swallowing. 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.\par PATH: granulation, inflammation, no carcinoma.\par 3/26/22 - seen by Endocrine (IV[facility])\par 3/31/22 - seen by rad onc (CLAYTON) on lexapro 5mg daily.\par 4/4/22 - seen by surgery \par 4/18/22 - 5/30/22 - completed 6-week course of chemoRT\par 7/5/22 - tmz cycle #1, dose 250mg (150mg/m2) \par  8/6/22 - tmz cycle #2, dose 340mg (200mg/m2) \par 8/11/2022 - MRI stable \par 9/6/2022 - Reports Occasional twitching of left ue - hand - Keppra started on 6/13 - episodes reduced in frequency but still happening- daughter showed me a video from 8/19 - appeared to behaving rhythmic movements of right UE. Patient himself is unable to tell me whether he is aware of these events. Daughter reports compliance with medications. Raised Keppra to 750 mg bid\par 9/30/2022- 10/3- TMZ # 3 ( 340 MG x 4 DAYS sec to Thrombocytopenia)\par 10/19/2022 - MRI stable\par 11/25-11/29- Cycle # 5 \par 12/15/2022- Patient here for a follow up along with a new MRI. Offers no new complaints - reports he is feeling better - improved energy. Denies headaches or further seizures.\par

## 2022-12-20 NOTE — DISCUSSION/SUMMARY
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 Completed 6 weeks of RT with Temodar and, thus far, 5 cycles of adjuvant Temodar\par Neurologically stable\par MRI from today reviewed and noted a small nodular enhancement both lateral ( not new) and inferior ( new) aspect of left frontal cavity\par Will check CBC and CMP today.\par He is due for Cycle #6 12/23 - pending review of blood work.\par Will do a clinical follow along with an MRI  up in a month\par In the interim, if any concerns patient knows to call our office.

## 2022-12-20 NOTE — PHYSICAL EXAM
[General Appearance - Alert] : alert [General Appearance - In No Acute Distress] : in no acute distress [] : no respiratory distress [Respiration, Rhythm And Depth] : normal respiratory rhythm and effort [Exaggerated Use Of Accessory Muscles For Inspiration] : no accessory muscle use [Edema] : there was no peripheral edema [FreeTextEntry1] : Awake and alert - oriented to month, year, and president.\par Speech is fluent with normal reception\par EOMI, VFF\par Face symmetric - tongue midline\par Trach collar is in place\par No noted drift\par UE strength full and symmetric - FFM intact\par SABRINA and FNF intact bilaterally\par LE strong - ambulates independently and steadily.\par No LE edema noted.

## 2022-12-27 NOTE — PHYSICAL EXAM
[Midline] : trachea located in midline position [Laryngoscopy Performed] : laryngoscopy was performed, see procedure section for findings [Normal] : no rashes [de-identified] : Size 7 portex tube in place, no granulation, very loose - half the trach is sitting out - the straps were tightened.

## 2022-12-27 NOTE — PROCEDURE
[FreeTextEntry1] : Trach change, FOB, FFL.  [FreeTextEntry2] : Confirm placement of tracheostomy, chronic coughing, stomal granulation.  [FreeTextEntry3] : After patient gave consent, a FFL was performed. No lesions in the NPx, OPx, HPx or larynx. The VC seemed paretic, R > L, with limited abduction. A FOB was then performed and we replaced the trach in the site and confirmed placement and there is some mild granulation at the stomal site. No lesions in the trachea, no significant secretions. \par \par Scope: 212

## 2022-12-27 NOTE — REASON FOR VISIT
[Subsequent Evaluation] : a subsequent evaluation for [Family Member] : family member [Source: ______] : History obtained from [unfilled] [FreeTextEntry2] : trach care

## 2023-01-01 ENCOUNTER — OUTPATIENT (OUTPATIENT)
Dept: OUTPATIENT SERVICES | Facility: HOSPITAL | Age: 68
LOS: 1 days | End: 2023-01-01
Payer: MEDICARE

## 2023-01-01 ENCOUNTER — APPOINTMENT (OUTPATIENT)
Dept: CARDIOLOGY | Facility: CLINIC | Age: 68
End: 2023-01-01
Payer: MEDICARE

## 2023-01-01 ENCOUNTER — NON-APPOINTMENT (OUTPATIENT)
Age: 68
End: 2023-01-01

## 2023-01-01 ENCOUNTER — APPOINTMENT (OUTPATIENT)
Dept: MRI IMAGING | Facility: IMAGING CENTER | Age: 68
End: 2023-01-01
Payer: MEDICARE

## 2023-01-01 ENCOUNTER — APPOINTMENT (OUTPATIENT)
Dept: VASCULAR SURGERY | Facility: CLINIC | Age: 68
End: 2023-01-01
Payer: MEDICARE

## 2023-01-01 ENCOUNTER — OUTPATIENT (OUTPATIENT)
Dept: OUTPATIENT SERVICES | Facility: HOSPITAL | Age: 68
LOS: 1 days | End: 2023-01-01
Payer: MEDICAID

## 2023-01-01 ENCOUNTER — RX RENEWAL (OUTPATIENT)
Age: 68
End: 2023-01-01

## 2023-01-01 ENCOUNTER — TRANSCRIPTION ENCOUNTER (OUTPATIENT)
Age: 68
End: 2023-01-01

## 2023-01-01 ENCOUNTER — APPOINTMENT (OUTPATIENT)
Dept: NEUROSURGERY | Facility: CLINIC | Age: 68
End: 2023-01-01

## 2023-01-01 ENCOUNTER — APPOINTMENT (OUTPATIENT)
Dept: NEUROLOGY | Facility: CLINIC | Age: 68
End: 2023-01-01
Payer: MEDICAID

## 2023-01-01 ENCOUNTER — INPATIENT (INPATIENT)
Facility: HOSPITAL | Age: 68
LOS: 13 days | Discharge: NURSING, REHAB OR LONG-TERM CARE | DRG: 54 | End: 2023-03-21
Attending: NEUROLOGICAL SURGERY | Admitting: NEUROLOGICAL SURGERY
Payer: MEDICARE

## 2023-01-01 ENCOUNTER — APPOINTMENT (OUTPATIENT)
Dept: OTOLARYNGOLOGY | Facility: CLINIC | Age: 68
End: 2023-01-01
Payer: MEDICARE

## 2023-01-01 ENCOUNTER — APPOINTMENT (OUTPATIENT)
Dept: MRI IMAGING | Facility: HOSPITAL | Age: 68
End: 2023-01-01

## 2023-01-01 ENCOUNTER — APPOINTMENT (OUTPATIENT)
Dept: NEUROLOGY | Facility: CLINIC | Age: 68
End: 2023-01-01
Payer: MEDICARE

## 2023-01-01 ENCOUNTER — APPOINTMENT (OUTPATIENT)
Dept: CT IMAGING | Facility: IMAGING CENTER | Age: 68
End: 2023-01-01
Payer: MEDICARE

## 2023-01-01 ENCOUNTER — APPOINTMENT (OUTPATIENT)
Dept: OTOLARYNGOLOGY | Facility: CLINIC | Age: 68
End: 2023-01-01

## 2023-01-01 ENCOUNTER — APPOINTMENT (OUTPATIENT)
Dept: CARDIOLOGY | Facility: CLINIC | Age: 68
End: 2023-01-01

## 2023-01-01 ENCOUNTER — APPOINTMENT (OUTPATIENT)
Dept: ENDOCRINOLOGY | Facility: CLINIC | Age: 68
End: 2023-01-01

## 2023-01-01 ENCOUNTER — APPOINTMENT (OUTPATIENT)
Dept: INTERNAL MEDICINE | Facility: CLINIC | Age: 68
End: 2023-01-01
Payer: MEDICARE

## 2023-01-01 ENCOUNTER — INPATIENT (INPATIENT)
Facility: HOSPITAL | Age: 68
LOS: 9 days | Discharge: HOME | DRG: 949 | End: 2023-03-31
Attending: PHYSICAL MEDICINE & REHABILITATION | Admitting: PSYCHIATRY & NEUROLOGY
Payer: COMMERCIAL

## 2023-01-01 ENCOUNTER — APPOINTMENT (OUTPATIENT)
Dept: NEUROSURGERY | Facility: CLINIC | Age: 68
End: 2023-01-01
Payer: MEDICARE

## 2023-01-01 ENCOUNTER — APPOINTMENT (OUTPATIENT)
Dept: RADIOLOGY | Facility: IMAGING CENTER | Age: 68
End: 2023-01-01

## 2023-01-01 ENCOUNTER — APPOINTMENT (OUTPATIENT)
Dept: HEPATOLOGY | Facility: CLINIC | Age: 68
End: 2023-01-01

## 2023-01-01 ENCOUNTER — APPOINTMENT (OUTPATIENT)
Dept: INTERNAL MEDICINE | Facility: CLINIC | Age: 68
End: 2023-01-01

## 2023-01-01 ENCOUNTER — APPOINTMENT (OUTPATIENT)
Dept: RADIATION ONCOLOGY | Facility: CLINIC | Age: 68
End: 2023-01-01
Payer: MEDICARE

## 2023-01-01 ENCOUNTER — LABORATORY RESULT (OUTPATIENT)
Age: 68
End: 2023-01-01

## 2023-01-01 ENCOUNTER — APPOINTMENT (OUTPATIENT)
Dept: PULMONOLOGY | Facility: CLINIC | Age: 68
End: 2023-01-01
Payer: MEDICARE

## 2023-01-01 ENCOUNTER — APPOINTMENT (OUTPATIENT)
Dept: ENDOCRINOLOGY | Facility: CLINIC | Age: 68
End: 2023-01-01
Payer: MEDICARE

## 2023-01-01 ENCOUNTER — INPATIENT (INPATIENT)
Facility: HOSPITAL | Age: 68
LOS: 35 days | DRG: 4 | End: 2023-08-12
Attending: NEUROLOGICAL SURGERY | Admitting: NEUROLOGICAL SURGERY
Payer: MEDICARE

## 2023-01-01 VITALS
DIASTOLIC BLOOD PRESSURE: 76 MMHG | HEIGHT: 67 IN | HEART RATE: 71 BPM | BODY MASS INDEX: 25.11 KG/M2 | WEIGHT: 160 LBS | SYSTOLIC BLOOD PRESSURE: 118 MMHG

## 2023-01-01 VITALS
BODY MASS INDEX: 23.86 KG/M2 | SYSTOLIC BLOOD PRESSURE: 100 MMHG | WEIGHT: 152 LBS | HEIGHT: 67 IN | DIASTOLIC BLOOD PRESSURE: 70 MMHG | OXYGEN SATURATION: 97 % | HEART RATE: 82 BPM

## 2023-01-01 VITALS
DIASTOLIC BLOOD PRESSURE: 58 MMHG | WEIGHT: 156 LBS | TEMPERATURE: 97.9 F | OXYGEN SATURATION: 97 % | SYSTOLIC BLOOD PRESSURE: 98 MMHG | HEIGHT: 67 IN | HEART RATE: 73 BPM | BODY MASS INDEX: 24.48 KG/M2

## 2023-01-01 VITALS
RESPIRATION RATE: 18 BRPM | DIASTOLIC BLOOD PRESSURE: 75 MMHG | SYSTOLIC BLOOD PRESSURE: 117 MMHG | HEIGHT: 67 IN | TEMPERATURE: 97 F | OXYGEN SATURATION: 97 % | WEIGHT: 159 LBS | HEART RATE: 73 BPM | BODY MASS INDEX: 24.96 KG/M2

## 2023-01-01 VITALS
SYSTOLIC BLOOD PRESSURE: 112 MMHG | HEIGHT: 67 IN | HEART RATE: 66 BPM | RESPIRATION RATE: 16 BRPM | BODY MASS INDEX: 25.9 KG/M2 | WEIGHT: 165 LBS | DIASTOLIC BLOOD PRESSURE: 72 MMHG | TEMPERATURE: 98.4 F | OXYGEN SATURATION: 98 %

## 2023-01-01 VITALS
DIASTOLIC BLOOD PRESSURE: 60 MMHG | HEIGHT: 67 IN | OXYGEN SATURATION: 97 % | SYSTOLIC BLOOD PRESSURE: 94 MMHG | WEIGHT: 166 LBS | BODY MASS INDEX: 26.06 KG/M2 | HEART RATE: 70 BPM | TEMPERATURE: 97.3 F

## 2023-01-01 VITALS
DIASTOLIC BLOOD PRESSURE: 75 MMHG | OXYGEN SATURATION: 96 % | WEIGHT: 157 LBS | SYSTOLIC BLOOD PRESSURE: 125 MMHG | BODY MASS INDEX: 24.64 KG/M2 | RESPIRATION RATE: 12 BRPM | TEMPERATURE: 98.1 F | HEIGHT: 67 IN | HEART RATE: 76 BPM

## 2023-01-01 VITALS
WEIGHT: 156 LBS | HEART RATE: 97 BPM | DIASTOLIC BLOOD PRESSURE: 56 MMHG | HEIGHT: 67 IN | TEMPERATURE: 98.1 F | BODY MASS INDEX: 24.48 KG/M2 | OXYGEN SATURATION: 97 % | SYSTOLIC BLOOD PRESSURE: 104 MMHG

## 2023-01-01 VITALS
HEIGHT: 67 IN | TEMPERATURE: 98.4 F | BODY MASS INDEX: 24.48 KG/M2 | HEART RATE: 86 BPM | DIASTOLIC BLOOD PRESSURE: 58 MMHG | WEIGHT: 156 LBS | OXYGEN SATURATION: 96 % | SYSTOLIC BLOOD PRESSURE: 90 MMHG

## 2023-01-01 VITALS
DIASTOLIC BLOOD PRESSURE: 76 MMHG | RESPIRATION RATE: 16 BRPM | BODY MASS INDEX: 24.64 KG/M2 | WEIGHT: 157 LBS | HEIGHT: 67 IN | OXYGEN SATURATION: 99 % | SYSTOLIC BLOOD PRESSURE: 108 MMHG | HEART RATE: 86 BPM

## 2023-01-01 VITALS
DIASTOLIC BLOOD PRESSURE: 63 MMHG | HEART RATE: 58 BPM | RESPIRATION RATE: 18 BRPM | OXYGEN SATURATION: 96 % | SYSTOLIC BLOOD PRESSURE: 111 MMHG

## 2023-01-01 VITALS
HEART RATE: 83 BPM | DIASTOLIC BLOOD PRESSURE: 60 MMHG | TEMPERATURE: 97.8 F | BODY MASS INDEX: 25.11 KG/M2 | WEIGHT: 160 LBS | HEIGHT: 67 IN | OXYGEN SATURATION: 95 % | SYSTOLIC BLOOD PRESSURE: 90 MMHG

## 2023-01-01 VITALS
HEART RATE: 79 BPM | SYSTOLIC BLOOD PRESSURE: 95 MMHG | HEIGHT: 67 IN | TEMPERATURE: 98.3 F | WEIGHT: 160 LBS | BODY MASS INDEX: 25.11 KG/M2 | DIASTOLIC BLOOD PRESSURE: 64 MMHG

## 2023-01-01 VITALS
WEIGHT: 160.06 LBS | HEART RATE: 81 BPM | TEMPERATURE: 98 F | DIASTOLIC BLOOD PRESSURE: 87 MMHG | OXYGEN SATURATION: 97 % | RESPIRATION RATE: 17 BRPM | HEIGHT: 67 IN | SYSTOLIC BLOOD PRESSURE: 139 MMHG

## 2023-01-01 VITALS
TEMPERATURE: 98 F | HEIGHT: 66 IN | SYSTOLIC BLOOD PRESSURE: 101 MMHG | OXYGEN SATURATION: 95 % | RESPIRATION RATE: 20 BRPM | WEIGHT: 156.09 LBS | DIASTOLIC BLOOD PRESSURE: 62 MMHG | HEART RATE: 71 BPM

## 2023-01-01 VITALS
DIASTOLIC BLOOD PRESSURE: 69 MMHG | OXYGEN SATURATION: 94 % | TEMPERATURE: 98 F | RESPIRATION RATE: 16 BRPM | HEART RATE: 66 BPM | SYSTOLIC BLOOD PRESSURE: 112 MMHG

## 2023-01-01 VITALS
HEIGHT: 67 IN | HEART RATE: 72 BPM | OXYGEN SATURATION: 97 % | SYSTOLIC BLOOD PRESSURE: 94 MMHG | TEMPERATURE: 98.1 F | WEIGHT: 163.13 LBS | DIASTOLIC BLOOD PRESSURE: 70 MMHG | BODY MASS INDEX: 25.6 KG/M2

## 2023-01-01 VITALS — OXYGEN SATURATION: 100 % | RESPIRATION RATE: 18 BRPM | HEART RATE: 89 BPM

## 2023-01-01 VITALS
HEART RATE: 72 BPM | DIASTOLIC BLOOD PRESSURE: 71 MMHG | OXYGEN SATURATION: 96 % | RESPIRATION RATE: 18 BRPM | SYSTOLIC BLOOD PRESSURE: 115 MMHG

## 2023-01-01 VITALS — HEART RATE: 29 BPM | RESPIRATION RATE: 12 BRPM | OXYGEN SATURATION: 95 %

## 2023-01-01 VITALS — OXYGEN SATURATION: 97 %

## 2023-01-01 DIAGNOSIS — I50.21 ACUTE SYSTOLIC (CONGESTIVE) HEART FAILURE: ICD-10-CM

## 2023-01-01 DIAGNOSIS — C71.9 MALIGNANT NEOPLASM OF BRAIN, UNSPECIFIED: ICD-10-CM

## 2023-01-01 DIAGNOSIS — E55.9 VITAMIN D DEFICIENCY, UNSPECIFIED: ICD-10-CM

## 2023-01-01 DIAGNOSIS — D69.6 THROMBOCYTOPENIA, UNSPECIFIED: ICD-10-CM

## 2023-01-01 DIAGNOSIS — Z98.890 OTHER SPECIFIED POSTPROCEDURAL STATES: Chronic | ICD-10-CM

## 2023-01-01 DIAGNOSIS — R10.9 UNSPECIFIED ABDOMINAL PAIN: ICD-10-CM

## 2023-01-01 DIAGNOSIS — E11.9 TYPE 2 DIABETES MELLITUS W/OUT COMPLICATIONS: ICD-10-CM

## 2023-01-01 DIAGNOSIS — J39.8 OTHER SPECIFIED DISEASES OF UPPER RESPIRATORY TRACT: ICD-10-CM

## 2023-01-01 DIAGNOSIS — I51.81 TAKOTSUBO SYNDROME: ICD-10-CM

## 2023-01-01 DIAGNOSIS — J81.0 ACUTE PULMONARY EDEMA: ICD-10-CM

## 2023-01-01 DIAGNOSIS — I42.9 CARDIOMYOPATHY, UNSPECIFIED: ICD-10-CM

## 2023-01-01 DIAGNOSIS — H53.9 UNSPECIFIED VISUAL DISTURBANCE: ICD-10-CM

## 2023-01-01 DIAGNOSIS — I16.1 HYPERTENSIVE EMERGENCY: ICD-10-CM

## 2023-01-01 DIAGNOSIS — Z71.89 OTHER SPECIFIED COUNSELING: ICD-10-CM

## 2023-01-01 DIAGNOSIS — I10 ESSENTIAL (PRIMARY) HYPERTENSION: ICD-10-CM

## 2023-01-01 DIAGNOSIS — D50.9 IRON DEFICIENCY ANEMIA, UNSPECIFIED: ICD-10-CM

## 2023-01-01 DIAGNOSIS — Z78.9 OTHER SPECIFIED HEALTH STATUS: ICD-10-CM

## 2023-01-01 DIAGNOSIS — N40.0 BENIGN PROSTATIC HYPERPLASIA WITHOUT LOWER URINARY TRACT SYMPTOMS: ICD-10-CM

## 2023-01-01 DIAGNOSIS — I49.9 CARDIAC ARRHYTHMIA, UNSPECIFIED: ICD-10-CM

## 2023-01-01 DIAGNOSIS — E43 UNSPECIFIED SEVERE PROTEIN-CALORIE MALNUTRITION: ICD-10-CM

## 2023-01-01 DIAGNOSIS — Z01.818 ENCOUNTER FOR OTHER PREPROCEDURAL EXAMINATION: ICD-10-CM

## 2023-01-01 DIAGNOSIS — C71.1 MALIGNANT NEOPLASM OF FRONTAL LOBE: ICD-10-CM

## 2023-01-01 DIAGNOSIS — J96.01 ACUTE RESPIRATORY FAILURE WITH HYPOXIA: ICD-10-CM

## 2023-01-01 DIAGNOSIS — Z23 ENCOUNTER FOR IMMUNIZATION: ICD-10-CM

## 2023-01-01 DIAGNOSIS — J15.6 PNEUMONIA DUE TO OTHER GRAM-NEGATIVE BACTERIA: ICD-10-CM

## 2023-01-01 DIAGNOSIS — I82.561 CHRONIC EMBOLISM AND THROMBOSIS OF RIGHT CALF MUSCULAR VEIN: ICD-10-CM

## 2023-01-01 DIAGNOSIS — N17.9 ACUTE KIDNEY FAILURE, UNSPECIFIED: ICD-10-CM

## 2023-01-01 DIAGNOSIS — I87.2 VENOUS INSUFFICIENCY (CHRONIC) (PERIPHERAL): ICD-10-CM

## 2023-01-01 DIAGNOSIS — I47.1 SUPRAVENTRICULAR TACHYCARDIA: ICD-10-CM

## 2023-01-01 DIAGNOSIS — Z93.0 TRACHEOSTOMY STATUS: ICD-10-CM

## 2023-01-01 DIAGNOSIS — Z71.85 ENCOUNTER FOR IMMUNIZATION SAFETY COUNSELING: ICD-10-CM

## 2023-01-01 DIAGNOSIS — Z20.822 CONTACT WITH AND (SUSPECTED) EXPOSURE TO COVID-19: ICD-10-CM

## 2023-01-01 DIAGNOSIS — A41.9 SEPSIS, UNSPECIFIED ORGANISM: ICD-10-CM

## 2023-01-01 DIAGNOSIS — I25.10 ATHEROSCLEROTIC HEART DISEASE OF NATIVE CORONARY ARTERY W/OUT ANGINA PECTORIS: ICD-10-CM

## 2023-01-01 DIAGNOSIS — Z11.59 ENCOUNTER FOR SCREENING FOR OTHER VIRAL DISEASES: ICD-10-CM

## 2023-01-01 DIAGNOSIS — I42.0 DILATED CARDIOMYOPATHY: ICD-10-CM

## 2023-01-01 DIAGNOSIS — E78.5 HYPERLIPIDEMIA, UNSPECIFIED: ICD-10-CM

## 2023-01-01 DIAGNOSIS — Z51.5 ENCOUNTER FOR PALLIATIVE CARE: ICD-10-CM

## 2023-01-01 DIAGNOSIS — I83.893 VARICOSE VEINS OF BILATERAL LOWER EXTREMITIES WITH OTHER COMPLICATIONS: ICD-10-CM

## 2023-01-01 DIAGNOSIS — N40.0 BENIGN PROSTATIC HYPERPLASIA WITHOUT LOWER URINARY TRACT SYMPMS: ICD-10-CM

## 2023-01-01 DIAGNOSIS — E44.0 MODERATE PROTEIN-CALORIE MALNUTRITION: ICD-10-CM

## 2023-01-01 DIAGNOSIS — N20.0 CALCULUS OF KIDNEY: ICD-10-CM

## 2023-01-01 DIAGNOSIS — E87.1 HYPO-OSMOLALITY AND HYPONATREMIA: ICD-10-CM

## 2023-01-01 DIAGNOSIS — E11.9 TYPE 2 DIABETES MELLITUS WITHOUT COMPLICATIONS: ICD-10-CM

## 2023-01-01 DIAGNOSIS — Z13.228 ENCOUNTER FOR SCREENING FOR OTHER METABOLIC DISORDERS: ICD-10-CM

## 2023-01-01 DIAGNOSIS — R58 HEMORRHAGE, NOT ELSEWHERE CLASSIFIED: ICD-10-CM

## 2023-01-01 DIAGNOSIS — K59.09 OTHER CONSTIPATION: ICD-10-CM

## 2023-01-01 DIAGNOSIS — E22.2 SYNDROME OF INAPPROPRIATE SECRETION OF ANTIDIURETIC HORMONE: ICD-10-CM

## 2023-01-01 DIAGNOSIS — G93.49 OTHER ENCEPHALOPATHY: ICD-10-CM

## 2023-01-01 DIAGNOSIS — E11.10 TYPE 2 DIABETES MELLITUS WITH KETOACIDOSIS WITHOUT COMA: ICD-10-CM

## 2023-01-01 DIAGNOSIS — R41.82 ALTERED MENTAL STATUS, UNSPECIFIED: ICD-10-CM

## 2023-01-01 DIAGNOSIS — I82.401 ACUTE EMBOLISM AND THROMBOSIS OF UNSPECIFIED DEEP VEINS OF RIGHT LOWER EXTREMITY: ICD-10-CM

## 2023-01-01 DIAGNOSIS — R57.0 CARDIOGENIC SHOCK: ICD-10-CM

## 2023-01-01 DIAGNOSIS — K21.9 GASTRO-ESOPHAGEAL REFLUX DISEASE W/OUT ESOPHAGITIS: ICD-10-CM

## 2023-01-01 DIAGNOSIS — I95.9 HYPOTENSION, UNSPECIFIED: ICD-10-CM

## 2023-01-01 DIAGNOSIS — R53.81 OTHER MALAISE: ICD-10-CM

## 2023-01-01 DIAGNOSIS — R53.2 FUNCTIONAL QUADRIPLEGIA: ICD-10-CM

## 2023-01-01 DIAGNOSIS — I82.409 ACUTE EMBOLISM AND THROMBOSIS OF UNSPECIFIED DEEP VEINS OF UNSPECIFIED LOWER EXTREMITY: ICD-10-CM

## 2023-01-01 DIAGNOSIS — Z91.89 OTHER SPECIFIED PERSONAL RISK FACTORS, NOT ELSEWHERE CLASSIFIED: ICD-10-CM

## 2023-01-01 DIAGNOSIS — Z86.718 PERSONAL HISTORY OF OTHER VENOUS THROMBOSIS AND EMBOLISM: ICD-10-CM

## 2023-01-01 DIAGNOSIS — R53.83 OTHER FATIGUE: ICD-10-CM

## 2023-01-01 DIAGNOSIS — I48.91 UNSPECIFIED ATRIAL FIBRILLATION: ICD-10-CM

## 2023-01-01 LAB
-  AMOXICILLIN/CLAVULANIC ACID: SIGNIFICANT CHANGE UP
-  AMPICILLIN/SULBACTAM: SIGNIFICANT CHANGE UP
-  AMPICILLIN: SIGNIFICANT CHANGE UP
-  AZTREONAM: SIGNIFICANT CHANGE UP
-  BACTEROIDES FRAGILIS: SIGNIFICANT CHANGE UP
-  CANDIDA ALBICANS: SIGNIFICANT CHANGE UP
-  CANDIDA GLABRATA: SIGNIFICANT CHANGE UP
-  CANDIDA KRUSEI: SIGNIFICANT CHANGE UP
-  CANDIDA PARAPSILOSIS: SIGNIFICANT CHANGE UP
-  CANDIDA TROPICALIS: SIGNIFICANT CHANGE UP
-  CEFAZOLIN: SIGNIFICANT CHANGE UP
-  CEFEPIME: SIGNIFICANT CHANGE UP
-  CEFTRIAXONE: SIGNIFICANT CHANGE UP
-  CIPROFLOXACIN: SIGNIFICANT CHANGE UP
-  CLINDAMYCIN: SIGNIFICANT CHANGE UP
-  COAGULASE NEGATIVE STAPHYLOCOCCUS, METHICILLIN RESISTANT: SIGNIFICANT CHANGE UP
-  COAGULASE NEGATIVE STAPHYLOCOCCUS: SIGNIFICANT CHANGE UP
-  ENTEROBACTERALES: SIGNIFICANT CHANGE UP
-  ERTAPENEM: SIGNIFICANT CHANGE UP
-  ERYTHROMYCIN: SIGNIFICANT CHANGE UP
-  GENTAMICIN SYNERGY: SIGNIFICANT CHANGE UP
-  GENTAMICIN: SIGNIFICANT CHANGE UP
-  K. PNEUMONIAE GROUP: SIGNIFICANT CHANGE UP
-  K. PNEUMONIAE GROUP: SIGNIFICANT CHANGE UP
-  LEVOFLOXACIN: SIGNIFICANT CHANGE UP
-  LINEZOLID: SIGNIFICANT CHANGE UP
-  MEROPENEM: SIGNIFICANT CHANGE UP
-  MEROPENEM: SIGNIFICANT CHANGE UP
-  METRONIDAZOLE: SIGNIFICANT CHANGE UP
-  OXACILLIN: SIGNIFICANT CHANGE UP
-  PIPERACILLIN/TAZOBACTAM: SIGNIFICANT CHANGE UP
-  RIFAMPIN: SIGNIFICANT CHANGE UP
-  STAPHYLOCOCCUS EPIDERMIDIS, METHICILLIN RESISTANT: SIGNIFICANT CHANGE UP
-  STAPHYLOCOCCUS EPIDERMIDIS, METHICILLIN RESISTANT: SIGNIFICANT CHANGE UP
-  STAPHYLOCOCCUS EPIDERMIDIS: SIGNIFICANT CHANGE UP
-  STAPHYLOCOCCUS LUGDUNENSIS, METHICILLIN RESISTANT: SIGNIFICANT CHANGE UP
-  STENOTROPHOMONAS MALTOPHILIA: SIGNIFICANT CHANGE UP
-  STREPTOCOCCUS SP. (NOT GRP A, B OR S PNEUMONIAE): SIGNIFICANT CHANGE UP
-  STREPTOMYCIN SYNERGY: SIGNIFICANT CHANGE UP
-  TOBRAMYCIN: SIGNIFICANT CHANGE UP
-  TRIMETHOPRIM/SULFAMETHOXAZOLE: SIGNIFICANT CHANGE UP
-  VANCOMYCIN: SIGNIFICANT CHANGE UP
25(OH)D3 SERPL-MCNC: 18.4 NG/ML
A BAUMANNII DNA SPEC QL NAA+PROBE: SIGNIFICANT CHANGE UP
A1C WITH ESTIMATED AVERAGE GLUCOSE RESULT: 5.7 % — HIGH (ref 4–5.6)
A1C WITH ESTIMATED AVERAGE GLUCOSE RESULT: 5.8 % — HIGH (ref 4–5.6)
A1C WITH ESTIMATED AVERAGE GLUCOSE RESULT: 5.9 % — HIGH (ref 4–5.6)
ALBUMIN SERPL ELPH-MCNC: 1.7 G/DL — LOW (ref 3.3–5)
ALBUMIN SERPL ELPH-MCNC: 1.7 G/DL — LOW (ref 3.3–5)
ALBUMIN SERPL ELPH-MCNC: 1.8 G/DL — LOW (ref 3.3–5)
ALBUMIN SERPL ELPH-MCNC: 1.8 G/DL — LOW (ref 3.3–5)
ALBUMIN SERPL ELPH-MCNC: 1.9 G/DL — LOW (ref 3.3–5)
ALBUMIN SERPL ELPH-MCNC: 2.2 G/DL — LOW (ref 3.3–5)
ALBUMIN SERPL ELPH-MCNC: 2.6 G/DL — LOW (ref 3.3–5)
ALBUMIN SERPL ELPH-MCNC: 2.7 G/DL — LOW (ref 3.3–5)
ALBUMIN SERPL ELPH-MCNC: 2.8 G/DL — LOW (ref 3.3–5)
ALBUMIN SERPL ELPH-MCNC: 2.8 G/DL — LOW (ref 3.3–5)
ALBUMIN SERPL ELPH-MCNC: 2.9 G/DL — LOW (ref 3.3–5)
ALBUMIN SERPL ELPH-MCNC: 3 G/DL — LOW (ref 3.3–5)
ALBUMIN SERPL ELPH-MCNC: 3.2 G/DL — LOW (ref 3.3–5)
ALBUMIN SERPL ELPH-MCNC: 3.6 G/DL — SIGNIFICANT CHANGE UP (ref 3.3–5)
ALBUMIN SERPL ELPH-MCNC: 3.6 G/DL — SIGNIFICANT CHANGE UP (ref 3.3–5)
ALBUMIN SERPL ELPH-MCNC: 3.7 G/DL
ALBUMIN SERPL ELPH-MCNC: 3.8 G/DL — SIGNIFICANT CHANGE UP (ref 3.3–5)
ALBUMIN SERPL ELPH-MCNC: 3.9 G/DL — SIGNIFICANT CHANGE UP (ref 3.3–5)
ALBUMIN SERPL ELPH-MCNC: 4.3 G/DL
ALBUMIN SERPL ELPH-MCNC: 4.4 G/DL
ALBUMIN SERPL ELPH-MCNC: 4.6 G/DL
ALBUMIN SERPL ELPH-MCNC: 4.6 G/DL — SIGNIFICANT CHANGE UP (ref 3.3–5)
ALP BLD-CCNC: 77 U/L
ALP BLD-CCNC: 78 U/L
ALP BLD-CCNC: 80 U/L
ALP BLD-CCNC: 94 U/L
ALP SERPL-CCNC: 53 U/L — SIGNIFICANT CHANGE UP (ref 40–120)
ALP SERPL-CCNC: 53 U/L — SIGNIFICANT CHANGE UP (ref 40–120)
ALP SERPL-CCNC: 56 U/L — SIGNIFICANT CHANGE UP (ref 40–120)
ALP SERPL-CCNC: 57 U/L — SIGNIFICANT CHANGE UP (ref 40–120)
ALP SERPL-CCNC: 59 U/L — SIGNIFICANT CHANGE UP (ref 40–120)
ALP SERPL-CCNC: 60 U/L — SIGNIFICANT CHANGE UP (ref 40–120)
ALP SERPL-CCNC: 64 U/L — SIGNIFICANT CHANGE UP (ref 40–120)
ALP SERPL-CCNC: 64 U/L — SIGNIFICANT CHANGE UP (ref 40–120)
ALP SERPL-CCNC: 65 U/L — SIGNIFICANT CHANGE UP (ref 40–120)
ALP SERPL-CCNC: 66 U/L — SIGNIFICANT CHANGE UP (ref 40–120)
ALP SERPL-CCNC: 69 U/L — SIGNIFICANT CHANGE UP (ref 40–120)
ALP SERPL-CCNC: 70 U/L — SIGNIFICANT CHANGE UP (ref 40–120)
ALP SERPL-CCNC: 72 U/L — SIGNIFICANT CHANGE UP (ref 40–120)
ALP SERPL-CCNC: 73 U/L — SIGNIFICANT CHANGE UP (ref 40–120)
ALP SERPL-CCNC: 73 U/L — SIGNIFICANT CHANGE UP (ref 40–120)
ALP SERPL-CCNC: 74 U/L — SIGNIFICANT CHANGE UP (ref 40–120)
ALP SERPL-CCNC: 78 U/L — SIGNIFICANT CHANGE UP (ref 40–120)
ALP SERPL-CCNC: 81 U/L — SIGNIFICANT CHANGE UP (ref 40–120)
ALP SERPL-CCNC: 81 U/L — SIGNIFICANT CHANGE UP (ref 40–120)
ALP SERPL-CCNC: 85 U/L — SIGNIFICANT CHANGE UP (ref 40–120)
ALT FLD-CCNC: 10 U/L — SIGNIFICANT CHANGE UP (ref 10–45)
ALT FLD-CCNC: 13 U/L — SIGNIFICANT CHANGE UP (ref 10–45)
ALT FLD-CCNC: 15 U/L — SIGNIFICANT CHANGE UP (ref 10–45)
ALT FLD-CCNC: 16 U/L — SIGNIFICANT CHANGE UP (ref 10–45)
ALT FLD-CCNC: 21 U/L — SIGNIFICANT CHANGE UP (ref 10–45)
ALT FLD-CCNC: 22 U/L — SIGNIFICANT CHANGE UP (ref 10–45)
ALT FLD-CCNC: 28 U/L — SIGNIFICANT CHANGE UP (ref 10–45)
ALT FLD-CCNC: 28 U/L — SIGNIFICANT CHANGE UP (ref 10–45)
ALT FLD-CCNC: 32 U/L — SIGNIFICANT CHANGE UP (ref 10–45)
ALT FLD-CCNC: 41 U/L — SIGNIFICANT CHANGE UP (ref 10–45)
ALT FLD-CCNC: 43 U/L — SIGNIFICANT CHANGE UP (ref 10–45)
ALT FLD-CCNC: 49 U/L — HIGH (ref 10–45)
ALT FLD-CCNC: 51 U/L — HIGH (ref 10–45)
ALT FLD-CCNC: 61 U/L — HIGH (ref 10–45)
ALT FLD-CCNC: 69 U/L — HIGH (ref 10–45)
ALT FLD-CCNC: 75 U/L — HIGH (ref 10–45)
ALT FLD-CCNC: 84 U/L — HIGH (ref 10–45)
ALT FLD-CCNC: SIGNIFICANT CHANGE UP (ref 10–45)
ALT SERPL-CCNC: 11 U/L
ALT SERPL-CCNC: 12 U/L
ALT SERPL-CCNC: 13 U/L
ALT SERPL-CCNC: 18 U/L
AMMONIA BLD-MCNC: 48 UMOL/L — SIGNIFICANT CHANGE UP (ref 11–55)
AMMONIA BLD-MCNC: <10 UMOL/L — LOW (ref 11–55)
AMPHET UR-MCNC: NEGATIVE — SIGNIFICANT CHANGE UP
AMPHET UR-MCNC: NEGATIVE — SIGNIFICANT CHANGE UP
AMYLASE P1 CFR SERPL: 47 U/L — SIGNIFICANT CHANGE UP (ref 25–125)
AMYLASE P1 CFR SERPL: 78 U/L — SIGNIFICANT CHANGE UP (ref 25–125)
ANION GAP SERPL CALC-SCNC: 10 MMOL/L — SIGNIFICANT CHANGE UP (ref 5–17)
ANION GAP SERPL CALC-SCNC: 11 MMOL/L — SIGNIFICANT CHANGE UP (ref 5–17)
ANION GAP SERPL CALC-SCNC: 12 MMOL/L — SIGNIFICANT CHANGE UP (ref 5–17)
ANION GAP SERPL CALC-SCNC: 13 MMOL/L
ANION GAP SERPL CALC-SCNC: 13 MMOL/L — SIGNIFICANT CHANGE UP (ref 5–17)
ANION GAP SERPL CALC-SCNC: 13 MMOL/L — SIGNIFICANT CHANGE UP (ref 5–17)
ANION GAP SERPL CALC-SCNC: 14 MMOL/L — SIGNIFICANT CHANGE UP (ref 5–17)
ANION GAP SERPL CALC-SCNC: 15 MMOL/L
ANION GAP SERPL CALC-SCNC: 15 MMOL/L
ANION GAP SERPL CALC-SCNC: 15 MMOL/L — SIGNIFICANT CHANGE UP (ref 5–17)
ANION GAP SERPL CALC-SCNC: 16 MMOL/L
ANION GAP SERPL CALC-SCNC: 18 MMOL/L — HIGH (ref 5–17)
ANION GAP SERPL CALC-SCNC: 2 MMOL/L — LOW (ref 5–17)
ANION GAP SERPL CALC-SCNC: 20 MMOL/L — HIGH (ref 5–17)
ANION GAP SERPL CALC-SCNC: 22 MMOL/L — HIGH (ref 5–17)
ANION GAP SERPL CALC-SCNC: 24 MMOL/L — HIGH (ref 5–17)
ANION GAP SERPL CALC-SCNC: 4 MMOL/L — LOW (ref 5–17)
ANION GAP SERPL CALC-SCNC: 5 MMOL/L — SIGNIFICANT CHANGE UP (ref 5–17)
ANION GAP SERPL CALC-SCNC: 6 MMOL/L — SIGNIFICANT CHANGE UP (ref 5–17)
ANION GAP SERPL CALC-SCNC: 7 MMOL/L — SIGNIFICANT CHANGE UP (ref 5–17)
ANION GAP SERPL CALC-SCNC: 8 MMOL/L — SIGNIFICANT CHANGE UP (ref 5–17)
ANION GAP SERPL CALC-SCNC: 9 MMOL/L — SIGNIFICANT CHANGE UP (ref 5–17)
ANION GAP SERPL CALC-SCNC: SIGNIFICANT CHANGE UP MMOL/L (ref 5–17)
ANISOCYTOSIS BLD QL: SIGNIFICANT CHANGE UP
ANISOCYTOSIS BLD QL: SLIGHT — SIGNIFICANT CHANGE UP
APPEARANCE UR: ABNORMAL
APPEARANCE UR: CLEAR — SIGNIFICANT CHANGE UP
APPEARANCE: CLEAR
APTT BLD: 21 SEC — LOW (ref 27.5–35.5)
APTT BLD: 23.7 SEC — LOW (ref 27.5–35.5)
APTT BLD: 25.4 SEC — LOW (ref 27.5–35.5)
APTT BLD: 25.8 SEC — LOW (ref 27.5–35.5)
APTT BLD: 26.1 SEC — SIGNIFICANT CHANGE UP (ref 24.5–35.6)
APTT BLD: 27.4 SEC — SIGNIFICANT CHANGE UP (ref 24.5–35.6)
APTT BLD: 27.5 SEC — SIGNIFICANT CHANGE UP (ref 27.5–35.5)
APTT BLD: 28.3 SEC
APTT BLD: 28.7 SEC — SIGNIFICANT CHANGE UP (ref 27.5–35.5)
APTT BLD: 35 SEC — SIGNIFICANT CHANGE UP (ref 24.5–35.6)
APTT BLD: 38.8 SEC — HIGH (ref 24.5–35.6)
APTT BLD: 41 SEC — HIGH (ref 24.5–35.6)
AST SERPL-CCNC: 14 U/L — SIGNIFICANT CHANGE UP (ref 10–40)
AST SERPL-CCNC: 15 U/L — SIGNIFICANT CHANGE UP (ref 10–40)
AST SERPL-CCNC: 16 U/L
AST SERPL-CCNC: 16 U/L — SIGNIFICANT CHANGE UP (ref 10–40)
AST SERPL-CCNC: 17 U/L
AST SERPL-CCNC: 19 U/L — SIGNIFICANT CHANGE UP (ref 10–40)
AST SERPL-CCNC: 20 U/L — SIGNIFICANT CHANGE UP (ref 10–40)
AST SERPL-CCNC: 21 U/L
AST SERPL-CCNC: 21 U/L — SIGNIFICANT CHANGE UP (ref 10–40)
AST SERPL-CCNC: 22 U/L
AST SERPL-CCNC: 22 U/L — SIGNIFICANT CHANGE UP (ref 10–40)
AST SERPL-CCNC: 22 U/L — SIGNIFICANT CHANGE UP (ref 10–40)
AST SERPL-CCNC: 25 U/L — SIGNIFICANT CHANGE UP (ref 10–40)
AST SERPL-CCNC: 26 U/L — SIGNIFICANT CHANGE UP (ref 10–40)
AST SERPL-CCNC: 27 U/L — SIGNIFICANT CHANGE UP (ref 10–40)
AST SERPL-CCNC: 29 U/L — SIGNIFICANT CHANGE UP (ref 10–40)
AST SERPL-CCNC: 31 U/L — SIGNIFICANT CHANGE UP (ref 10–40)
AST SERPL-CCNC: 32 U/L — SIGNIFICANT CHANGE UP (ref 10–40)
AST SERPL-CCNC: 32 U/L — SIGNIFICANT CHANGE UP (ref 10–40)
AST SERPL-CCNC: 33 U/L — SIGNIFICANT CHANGE UP (ref 10–40)
AST SERPL-CCNC: 34 U/L — SIGNIFICANT CHANGE UP (ref 10–40)
AST SERPL-CCNC: 37 U/L — SIGNIFICANT CHANGE UP (ref 10–40)
AST SERPL-CCNC: 42 U/L — HIGH (ref 10–40)
AST SERPL-CCNC: 44 U/L — HIGH (ref 10–40)
AST SERPL-CCNC: 88 U/L — HIGH (ref 10–40)
AST SERPL-CCNC: SIGNIFICANT CHANGE UP (ref 10–40)
B-OH-BUTYR SERPL-SCNC: 0.1 MMOL/L — SIGNIFICANT CHANGE UP
B-OH-BUTYR SERPL-SCNC: 0.2 MMOL/L — SIGNIFICANT CHANGE UP
B-OH-BUTYR SERPL-SCNC: 1.3 MMOL/L — HIGH
BACTERIA # UR AUTO: PRESENT /HPF
BACTERIA # UR AUTO: SIGNIFICANT CHANGE UP /HPF
BACTERIA # UR AUTO: SIGNIFICANT CHANGE UP /HPF
BACTERIA: NEGATIVE
BARBITURATES UR SCN-MCNC: NEGATIVE — SIGNIFICANT CHANGE UP
BARBITURATES UR SCN-MCNC: NEGATIVE — SIGNIFICANT CHANGE UP
BASE EXCESS BLDA CALC-SCNC: -0.4 MMOL/L — SIGNIFICANT CHANGE UP (ref -2–3)
BASE EXCESS BLDA CALC-SCNC: -0.8 MMOL/L — SIGNIFICANT CHANGE UP (ref -2–3)
BASE EXCESS BLDA CALC-SCNC: -1.9 MMOL/L — SIGNIFICANT CHANGE UP (ref -2–3)
BASE EXCESS BLDA CALC-SCNC: -11.1 MMOL/L — LOW (ref -2–3)
BASE EXCESS BLDA CALC-SCNC: -11.8 MMOL/L — LOW (ref -2–3)
BASE EXCESS BLDA CALC-SCNC: -13.6 MMOL/L — LOW (ref -2–3)
BASE EXCESS BLDA CALC-SCNC: -2.6 MMOL/L — LOW (ref -2–3)
BASE EXCESS BLDA CALC-SCNC: -20 MMOL/L — LOW (ref -2–3)
BASE EXCESS BLDA CALC-SCNC: -3.9 MMOL/L — LOW (ref -2–3)
BASE EXCESS BLDA CALC-SCNC: -3.9 MMOL/L — LOW (ref -2–3)
BASE EXCESS BLDA CALC-SCNC: -4.5 MMOL/L — LOW (ref -2–3)
BASE EXCESS BLDA CALC-SCNC: -5.4 MMOL/L — LOW (ref -2–3)
BASE EXCESS BLDA CALC-SCNC: -7 MMOL/L — LOW (ref -2–3)
BASE EXCESS BLDA CALC-SCNC: -7.3 MMOL/L — LOW (ref -2–3)
BASE EXCESS BLDA CALC-SCNC: -7.5 MMOL/L — LOW (ref -2–3)
BASE EXCESS BLDA CALC-SCNC: -7.6 MMOL/L — LOW (ref -2–3)
BASE EXCESS BLDA CALC-SCNC: -7.6 MMOL/L — LOW (ref -2–3)
BASE EXCESS BLDA CALC-SCNC: -8.4 MMOL/L — LOW (ref -2–3)
BASE EXCESS BLDA CALC-SCNC: -9.3 MMOL/L — LOW (ref -2–3)
BASE EXCESS BLDA CALC-SCNC: -9.3 MMOL/L — LOW (ref -2–3)
BASE EXCESS BLDA CALC-SCNC: 0.2 MMOL/L — SIGNIFICANT CHANGE UP (ref -2–3)
BASE EXCESS BLDA CALC-SCNC: 0.4 MMOL/L — SIGNIFICANT CHANGE UP (ref -2–3)
BASE EXCESS BLDA CALC-SCNC: 0.9 MMOL/L — SIGNIFICANT CHANGE UP (ref -2–3)
BASE EXCESS BLDA CALC-SCNC: 1.7 MMOL/L — SIGNIFICANT CHANGE UP (ref -2–3)
BASE EXCESS BLDA CALC-SCNC: 2.3 MMOL/L — SIGNIFICANT CHANGE UP (ref -2–3)
BASE EXCESS BLDA CALC-SCNC: 2.4 MMOL/L — SIGNIFICANT CHANGE UP (ref -2–3)
BASE EXCESS BLDA CALC-SCNC: 3.4 MMOL/L — HIGH (ref -2–3)
BASE EXCESS BLDA CALC-SCNC: 3.9 MMOL/L — HIGH (ref -2–3)
BASE EXCESS BLDA CALC-SCNC: 3.9 MMOL/L — HIGH (ref -2–3)
BASE EXCESS BLDA CALC-SCNC: 4.5 MMOL/L — HIGH (ref -2–3)
BASE EXCESS BLDA CALC-SCNC: 5.4 MMOL/L — HIGH (ref -2–3)
BASE EXCESS BLDA CALC-SCNC: 5.7 MMOL/L — HIGH (ref -2–3)
BASE EXCESS BLDA CALC-SCNC: 6.6 MMOL/L — HIGH (ref -2–3)
BASE EXCESS BLDV CALC-SCNC: -1.5 MMOL/L — SIGNIFICANT CHANGE UP (ref -2–3)
BASE EXCESS BLDV CALC-SCNC: -8.4 MMOL/L — LOW (ref -2–3)
BASE EXCESS BLDV CALC-SCNC: 2.4 MMOL/L — SIGNIFICANT CHANGE UP (ref -2–3)
BASE EXCESS BLDV CALC-SCNC: 3 MMOL/L — SIGNIFICANT CHANGE UP (ref -2–3)
BASE EXCESS BLDV CALC-SCNC: 4 MMOL/L — HIGH (ref -2–3)
BASOPHILS # BLD AUTO: 0 K/UL — SIGNIFICANT CHANGE UP (ref 0–0.2)
BASOPHILS # BLD AUTO: 0.01 K/UL — SIGNIFICANT CHANGE UP (ref 0–0.2)
BASOPHILS # BLD AUTO: 0.03 K/UL — SIGNIFICANT CHANGE UP (ref 0–0.2)
BASOPHILS # BLD AUTO: 0.03 K/UL — SIGNIFICANT CHANGE UP (ref 0–0.2)
BASOPHILS # BLD AUTO: 0.04 K/UL
BASOPHILS # BLD AUTO: 0.05 K/UL
BASOPHILS # BLD AUTO: 0.06 K/UL
BASOPHILS NFR BLD AUTO: 0 % — SIGNIFICANT CHANGE UP (ref 0–2)
BASOPHILS NFR BLD AUTO: 0.1 % — SIGNIFICANT CHANGE UP (ref 0–2)
BASOPHILS NFR BLD AUTO: 0.4 %
BASOPHILS NFR BLD AUTO: 0.4 % — SIGNIFICANT CHANGE UP (ref 0–2)
BASOPHILS NFR BLD AUTO: 0.8 %
BASOPHILS NFR BLD AUTO: 0.8 %
BASOPHILS NFR BLD AUTO: 0.9 %
BASOPHILS NFR BLD AUTO: 0.9 % — SIGNIFICANT CHANGE UP (ref 0–2)
BASOPHILS NFR BLD AUTO: 1 %
BASOPHILS NFR BLD AUTO: 1.1 %
BENZODIAZ UR-MCNC: NEGATIVE — SIGNIFICANT CHANGE UP
BENZODIAZ UR-MCNC: NEGATIVE — SIGNIFICANT CHANGE UP
BILIRUB DIRECT SERPL-MCNC: 0.2 MG/DL
BILIRUB DIRECT SERPL-MCNC: 0.3 MG/DL — SIGNIFICANT CHANGE UP (ref 0–0.3)
BILIRUB DIRECT SERPL-MCNC: 0.4 MG/DL — HIGH (ref 0–0.3)
BILIRUB DIRECT SERPL-MCNC: 0.6 MG/DL — HIGH (ref 0–0.3)
BILIRUB DIRECT SERPL-MCNC: 1.1 MG/DL — HIGH (ref 0–0.3)
BILIRUB DIRECT SERPL-MCNC: 1.5 MG/DL — HIGH (ref 0–0.3)
BILIRUB DIRECT SERPL-MCNC: <0.2 MG/DL — SIGNIFICANT CHANGE UP (ref 0–0.3)
BILIRUB INDIRECT FLD-MCNC: 0.7 MG/DL — SIGNIFICANT CHANGE UP (ref 0.2–1)
BILIRUB INDIRECT FLD-MCNC: 0.8 MG/DL — SIGNIFICANT CHANGE UP (ref 0.2–1)
BILIRUB INDIRECT FLD-MCNC: 1.1 MG/DL — HIGH (ref 0.2–1)
BILIRUB INDIRECT FLD-MCNC: 1.6 MG/DL — HIGH (ref 0.2–1)
BILIRUB INDIRECT FLD-MCNC: SIGNIFICANT CHANGE UP MG/DL (ref 0.2–1)
BILIRUB INDIRECT SERPL-MCNC: 0.4 MG/DL
BILIRUB SERPL-MCNC: 0.3 MG/DL
BILIRUB SERPL-MCNC: 0.3 MG/DL — SIGNIFICANT CHANGE UP (ref 0.2–1.2)
BILIRUB SERPL-MCNC: 0.4 MG/DL — SIGNIFICANT CHANGE UP (ref 0.2–1.2)
BILIRUB SERPL-MCNC: 0.4 MG/DL — SIGNIFICANT CHANGE UP (ref 0.2–1.2)
BILIRUB SERPL-MCNC: 0.5 MG/DL
BILIRUB SERPL-MCNC: 0.5 MG/DL — SIGNIFICANT CHANGE UP (ref 0.2–1.2)
BILIRUB SERPL-MCNC: 0.6 MG/DL — SIGNIFICANT CHANGE UP (ref 0.2–1.2)
BILIRUB SERPL-MCNC: 0.7 MG/DL — SIGNIFICANT CHANGE UP (ref 0.2–1.2)
BILIRUB SERPL-MCNC: 0.9 MG/DL — SIGNIFICANT CHANGE UP (ref 0.2–1.2)
BILIRUB SERPL-MCNC: 1 MG/DL — SIGNIFICANT CHANGE UP (ref 0.2–1.2)
BILIRUB SERPL-MCNC: 1.1 MG/DL — SIGNIFICANT CHANGE UP (ref 0.2–1.2)
BILIRUB SERPL-MCNC: 1.2 MG/DL — SIGNIFICANT CHANGE UP (ref 0.2–1.2)
BILIRUB SERPL-MCNC: 1.2 MG/DL — SIGNIFICANT CHANGE UP (ref 0.2–1.2)
BILIRUB SERPL-MCNC: 1.4 MG/DL — HIGH (ref 0.2–1.2)
BILIRUB SERPL-MCNC: 1.4 MG/DL — HIGH (ref 0.2–1.2)
BILIRUB SERPL-MCNC: 1.7 MG/DL — HIGH (ref 0.2–1.2)
BILIRUB SERPL-MCNC: 2.2 MG/DL — HIGH (ref 0.2–1.2)
BILIRUB SERPL-MCNC: 2.3 MG/DL — HIGH (ref 0.2–1.2)
BILIRUB SERPL-MCNC: 2.8 MG/DL — HIGH (ref 0.2–1.2)
BILIRUB SERPL-MCNC: 3 MG/DL — HIGH (ref 0.2–1.2)
BILIRUB SERPL-MCNC: 3 MG/DL — HIGH (ref 0.2–1.2)
BILIRUB SERPL-MCNC: 3.1 MG/DL — HIGH (ref 0.2–1.2)
BILIRUB UR-MCNC: ABNORMAL
BILIRUB UR-MCNC: NEGATIVE — SIGNIFICANT CHANGE UP
BILIRUBIN URINE: NEGATIVE
BLD GP AB SCN SERPL QL: NEGATIVE — SIGNIFICANT CHANGE UP
BLOOD URINE: NEGATIVE
BUN SERPL-MCNC: 10 MG/DL — SIGNIFICANT CHANGE UP (ref 7–23)
BUN SERPL-MCNC: 11 MG/DL
BUN SERPL-MCNC: 11 MG/DL — SIGNIFICANT CHANGE UP (ref 7–23)
BUN SERPL-MCNC: 12 MG/DL — SIGNIFICANT CHANGE UP (ref 7–23)
BUN SERPL-MCNC: 13 MG/DL
BUN SERPL-MCNC: 13 MG/DL — SIGNIFICANT CHANGE UP (ref 7–23)
BUN SERPL-MCNC: 14 MG/DL — SIGNIFICANT CHANGE UP (ref 7–23)
BUN SERPL-MCNC: 15 MG/DL — SIGNIFICANT CHANGE UP (ref 7–23)
BUN SERPL-MCNC: 16 MG/DL — SIGNIFICANT CHANGE UP (ref 7–23)
BUN SERPL-MCNC: 17 MG/DL — SIGNIFICANT CHANGE UP (ref 7–23)
BUN SERPL-MCNC: 18 MG/DL — SIGNIFICANT CHANGE UP (ref 7–23)
BUN SERPL-MCNC: 19 MG/DL — SIGNIFICANT CHANGE UP (ref 7–23)
BUN SERPL-MCNC: 19 MG/DL — SIGNIFICANT CHANGE UP (ref 7–23)
BUN SERPL-MCNC: 20 MG/DL — SIGNIFICANT CHANGE UP (ref 7–23)
BUN SERPL-MCNC: 20 MG/DL — SIGNIFICANT CHANGE UP (ref 7–23)
BUN SERPL-MCNC: 21 MG/DL — SIGNIFICANT CHANGE UP (ref 7–23)
BUN SERPL-MCNC: 22 MG/DL — SIGNIFICANT CHANGE UP (ref 7–23)
BUN SERPL-MCNC: 23 MG/DL — SIGNIFICANT CHANGE UP (ref 7–23)
BUN SERPL-MCNC: 23 MG/DL — SIGNIFICANT CHANGE UP (ref 7–23)
BUN SERPL-MCNC: 24 MG/DL — HIGH (ref 7–23)
BUN SERPL-MCNC: 25 MG/DL — HIGH (ref 7–23)
BUN SERPL-MCNC: 26 MG/DL — HIGH (ref 7–23)
BUN SERPL-MCNC: 29 MG/DL — HIGH (ref 7–23)
BUN SERPL-MCNC: 5 MG/DL — LOW (ref 7–23)
BUN SERPL-MCNC: 5 MG/DL — LOW (ref 7–23)
BUN SERPL-MCNC: 6 MG/DL — LOW (ref 7–23)
BUN SERPL-MCNC: 6 MG/DL — LOW (ref 7–23)
BUN SERPL-MCNC: 7 MG/DL
BUN SERPL-MCNC: 7 MG/DL
BUN SERPL-MCNC: 7 MG/DL — SIGNIFICANT CHANGE UP (ref 7–23)
BUN SERPL-MCNC: 7 MG/DL — SIGNIFICANT CHANGE UP (ref 7–23)
BUN SERPL-MCNC: 8 MG/DL — SIGNIFICANT CHANGE UP (ref 7–23)
BUN SERPL-MCNC: 9 MG/DL — SIGNIFICANT CHANGE UP (ref 7–23)
BURR CELLS BLD QL SMEAR: PRESENT — SIGNIFICANT CHANGE UP
BURR CELLS BLD QL SMEAR: SIGNIFICANT CHANGE UP
BURR CELLS BLD QL SMEAR: SIGNIFICANT CHANGE UP
BURR CELLS BLD QL SMEAR: SLIGHT — SIGNIFICANT CHANGE UP
C AURIS DNA BLD POS QL NAA+PROBE: SIGNIFICANT CHANGE UP
C DIFF GDH STL QL: NEGATIVE — SIGNIFICANT CHANGE UP
C DIFF GDH STL QL: SIGNIFICANT CHANGE UP
C NEOFORM DNA BLD POS QL NAA+PROBE: SIGNIFICANT CHANGE UP
CA-I BLDA-SCNC: 1.16 MMOL/L — SIGNIFICANT CHANGE UP (ref 1.15–1.33)
CA-I SERPL-SCNC: 1.09 MMOL/L — LOW (ref 1.15–1.33)
CA-I SERPL-SCNC: 1.1 MMOL/L — LOW (ref 1.15–1.33)
CA-I SERPL-SCNC: 1.11 MMOL/L — LOW (ref 1.15–1.33)
CA-I SERPL-SCNC: 1.13 MMOL/L — LOW (ref 1.15–1.33)
CALCIUM SERPL-MCNC: 10.2 MG/DL
CALCIUM SERPL-MCNC: 6.1 MG/DL — CRITICAL LOW (ref 8.4–10.5)
CALCIUM SERPL-MCNC: 6.2 MG/DL — CRITICAL LOW (ref 8.4–10.5)
CALCIUM SERPL-MCNC: 6.3 MG/DL — CRITICAL LOW (ref 8.4–10.5)
CALCIUM SERPL-MCNC: 6.5 MG/DL — CRITICAL LOW (ref 8.4–10.5)
CALCIUM SERPL-MCNC: 6.7 MG/DL — LOW (ref 8.4–10.5)
CALCIUM SERPL-MCNC: 6.7 MG/DL — LOW (ref 8.4–10.5)
CALCIUM SERPL-MCNC: 6.8 MG/DL — LOW (ref 8.4–10.5)
CALCIUM SERPL-MCNC: 6.9 MG/DL — LOW (ref 8.4–10.5)
CALCIUM SERPL-MCNC: 7 MG/DL — LOW (ref 8.4–10.5)
CALCIUM SERPL-MCNC: 7.1 MG/DL — LOW (ref 8.4–10.5)
CALCIUM SERPL-MCNC: 7.2 MG/DL — LOW (ref 8.4–10.5)
CALCIUM SERPL-MCNC: 7.3 MG/DL — LOW (ref 8.4–10.5)
CALCIUM SERPL-MCNC: 7.3 MG/DL — LOW (ref 8.4–10.5)
CALCIUM SERPL-MCNC: 7.4 MG/DL — LOW (ref 8.4–10.5)
CALCIUM SERPL-MCNC: 7.5 MG/DL — LOW (ref 8.4–10.5)
CALCIUM SERPL-MCNC: 7.6 MG/DL — LOW (ref 8.4–10.5)
CALCIUM SERPL-MCNC: 7.7 MG/DL — LOW (ref 8.4–10.5)
CALCIUM SERPL-MCNC: 7.8 MG/DL — LOW (ref 8.4–10.5)
CALCIUM SERPL-MCNC: 7.9 MG/DL — LOW (ref 8.4–10.5)
CALCIUM SERPL-MCNC: 8 MG/DL — LOW (ref 8.4–10.5)
CALCIUM SERPL-MCNC: 8.1 MG/DL — LOW (ref 8.4–10.5)
CALCIUM SERPL-MCNC: 8.1 MG/DL — LOW (ref 8.4–10.5)
CALCIUM SERPL-MCNC: 8.2 MG/DL — LOW (ref 8.4–10.5)
CALCIUM SERPL-MCNC: 8.3 MG/DL — LOW (ref 8.4–10.5)
CALCIUM SERPL-MCNC: 8.4 MG/DL — SIGNIFICANT CHANGE UP (ref 8.4–10.5)
CALCIUM SERPL-MCNC: 8.5 MG/DL — SIGNIFICANT CHANGE UP (ref 8.4–10.5)
CALCIUM SERPL-MCNC: 8.6 MG/DL — SIGNIFICANT CHANGE UP (ref 8.4–10.5)
CALCIUM SERPL-MCNC: 8.7 MG/DL — SIGNIFICANT CHANGE UP (ref 8.4–10.5)
CALCIUM SERPL-MCNC: 8.8 MG/DL — SIGNIFICANT CHANGE UP (ref 8.4–10.5)
CALCIUM SERPL-MCNC: 8.9 MG/DL — SIGNIFICANT CHANGE UP (ref 8.4–10.5)
CALCIUM SERPL-MCNC: 9 MG/DL — SIGNIFICANT CHANGE UP (ref 8.4–10.5)
CALCIUM SERPL-MCNC: 9.1 MG/DL — SIGNIFICANT CHANGE UP (ref 8.4–10.5)
CALCIUM SERPL-MCNC: 9.2 MG/DL — SIGNIFICANT CHANGE UP (ref 8.4–10.5)
CALCIUM SERPL-MCNC: 9.3 MG/DL — SIGNIFICANT CHANGE UP (ref 8.4–10.5)
CALCIUM SERPL-MCNC: 9.4 MG/DL — SIGNIFICANT CHANGE UP (ref 8.4–10.5)
CALCIUM SERPL-MCNC: 9.5 MG/DL
CALCIUM SERPL-MCNC: 9.7 MG/DL
CALCIUM SERPL-MCNC: 9.7 MG/DL
CALCIUM SERPL-MCNC: SIGNIFICANT CHANGE UP MG/DL (ref 8.4–10.5)
CHLORIDE SERPL-SCNC: 100 MMOL/L — SIGNIFICANT CHANGE UP (ref 96–108)
CHLORIDE SERPL-SCNC: 101 MMOL/L — SIGNIFICANT CHANGE UP (ref 96–108)
CHLORIDE SERPL-SCNC: 102 MMOL/L — SIGNIFICANT CHANGE UP (ref 96–108)
CHLORIDE SERPL-SCNC: 103 MMOL/L — SIGNIFICANT CHANGE UP (ref 96–108)
CHLORIDE SERPL-SCNC: 104 MMOL/L — SIGNIFICANT CHANGE UP (ref 96–108)
CHLORIDE SERPL-SCNC: 105 MMOL/L — SIGNIFICANT CHANGE UP (ref 96–108)
CHLORIDE SERPL-SCNC: 105 MMOL/L — SIGNIFICANT CHANGE UP (ref 96–108)
CHLORIDE SERPL-SCNC: 106 MMOL/L — SIGNIFICANT CHANGE UP (ref 96–108)
CHLORIDE SERPL-SCNC: 106 MMOL/L — SIGNIFICANT CHANGE UP (ref 96–108)
CHLORIDE SERPL-SCNC: 107 MMOL/L — SIGNIFICANT CHANGE UP (ref 96–108)
CHLORIDE SERPL-SCNC: 108 MMOL/L — SIGNIFICANT CHANGE UP (ref 96–108)
CHLORIDE SERPL-SCNC: 108 MMOL/L — SIGNIFICANT CHANGE UP (ref 96–108)
CHLORIDE SERPL-SCNC: 109 MMOL/L — HIGH (ref 96–108)
CHLORIDE SERPL-SCNC: 110 MMOL/L — HIGH (ref 96–108)
CHLORIDE SERPL-SCNC: 111 MMOL/L — HIGH (ref 96–108)
CHLORIDE SERPL-SCNC: 112 MMOL/L — HIGH (ref 96–108)
CHLORIDE SERPL-SCNC: 113 MMOL/L — HIGH (ref 96–108)
CHLORIDE SERPL-SCNC: 114 MMOL/L — HIGH (ref 96–108)
CHLORIDE SERPL-SCNC: 114 MMOL/L — HIGH (ref 96–108)
CHLORIDE SERPL-SCNC: 80 MMOL/L — LOW (ref 96–108)
CHLORIDE SERPL-SCNC: 82 MMOL/L — LOW (ref 96–108)
CHLORIDE SERPL-SCNC: 84 MMOL/L — LOW (ref 96–108)
CHLORIDE SERPL-SCNC: 85 MMOL/L — LOW (ref 96–108)
CHLORIDE SERPL-SCNC: 86 MMOL/L — LOW (ref 96–108)
CHLORIDE SERPL-SCNC: 86 MMOL/L — LOW (ref 96–108)
CHLORIDE SERPL-SCNC: 87 MMOL/L — LOW (ref 96–108)
CHLORIDE SERPL-SCNC: 87 MMOL/L — LOW (ref 96–108)
CHLORIDE SERPL-SCNC: 88 MMOL/L
CHLORIDE SERPL-SCNC: 88 MMOL/L — LOW (ref 96–108)
CHLORIDE SERPL-SCNC: 89 MMOL/L — LOW (ref 96–108)
CHLORIDE SERPL-SCNC: 90 MMOL/L — LOW (ref 96–108)
CHLORIDE SERPL-SCNC: 91 MMOL/L — LOW (ref 96–108)
CHLORIDE SERPL-SCNC: 92 MMOL/L — LOW (ref 96–108)
CHLORIDE SERPL-SCNC: 93 MMOL/L — LOW (ref 96–108)
CHLORIDE SERPL-SCNC: 94 MMOL/L
CHLORIDE SERPL-SCNC: 94 MMOL/L — LOW (ref 96–108)
CHLORIDE SERPL-SCNC: 95 MMOL/L — LOW (ref 96–108)
CHLORIDE SERPL-SCNC: 96 MMOL/L
CHLORIDE SERPL-SCNC: 96 MMOL/L — SIGNIFICANT CHANGE UP (ref 96–108)
CHLORIDE SERPL-SCNC: 97 MMOL/L — SIGNIFICANT CHANGE UP (ref 96–108)
CHLORIDE SERPL-SCNC: 98 MMOL/L
CHLORIDE SERPL-SCNC: 98 MMOL/L — SIGNIFICANT CHANGE UP (ref 96–108)
CHLORIDE SERPL-SCNC: 99 MMOL/L — SIGNIFICANT CHANGE UP (ref 96–108)
CHLORIDE SERPL-SCNC: SIGNIFICANT CHANGE UP MMOL/L (ref 96–108)
CHOLEST SERPL-MCNC: 100 MG/DL — SIGNIFICANT CHANGE UP
CHOLEST SERPL-MCNC: 111 MG/DL
CHOLEST SERPL-MCNC: 112 MG/DL
CK MB CFR SERPL CALC: 0.5 NG/ML — SIGNIFICANT CHANGE UP (ref 0.5–10)
CK MB CFR SERPL CALC: 1 NG/ML — SIGNIFICANT CHANGE UP (ref 0–6.7)
CK MB CFR SERPL CALC: 1 NG/ML — SIGNIFICANT CHANGE UP (ref 0–6.7)
CK MB CFR SERPL CALC: 5.3 NG/ML — SIGNIFICANT CHANGE UP (ref 0–6.7)
CK MB CFR SERPL CALC: <1 NG/ML — SIGNIFICANT CHANGE UP (ref 0–6.7)
CK MB CFR SERPL CALC: <1 NG/ML — SIGNIFICANT CHANGE UP (ref 0–6.7)
CK SERPL-CCNC: 114 U/L — SIGNIFICANT CHANGE UP (ref 30–200)
CK SERPL-CCNC: 30 U/L — SIGNIFICANT CHANGE UP (ref 30–200)
CK SERPL-CCNC: 36 U/L — SIGNIFICANT CHANGE UP (ref 30–200)
CK SERPL-CCNC: 40 U/L
CK SERPL-CCNC: 51 U/L — SIGNIFICANT CHANGE UP (ref 30–200)
CK SERPL-CCNC: 52 U/L — SIGNIFICANT CHANGE UP (ref 30–200)
CK SERPL-CCNC: 67 U/L
CK SERPL-CCNC: 67 U/L — SIGNIFICANT CHANGE UP (ref 30–200)
CK SERPL-CCNC: 80 U/L — SIGNIFICANT CHANGE UP (ref 30–200)
CK SERPL-CCNC: 94 U/L — SIGNIFICANT CHANGE UP (ref 30–200)
CO2 BLDA-SCNC: 12 MMOL/L — LOW (ref 19–24)
CO2 BLDA-SCNC: 13 MMOL/L — LOW (ref 19–24)
CO2 BLDA-SCNC: 15 MMOL/L — LOW (ref 19–24)
CO2 BLDA-SCNC: 17 MMOL/L — LOW (ref 19–24)
CO2 BLDA-SCNC: 17 MMOL/L — LOW (ref 19–24)
CO2 BLDA-SCNC: 18 MMOL/L — LOW (ref 19–24)
CO2 BLDA-SCNC: 19 MMOL/L — SIGNIFICANT CHANGE UP (ref 19–24)
CO2 BLDA-SCNC: 20 MMOL/L — SIGNIFICANT CHANGE UP (ref 19–24)
CO2 BLDA-SCNC: 21 MMOL/L — SIGNIFICANT CHANGE UP (ref 19–24)
CO2 BLDA-SCNC: 21 MMOL/L — SIGNIFICANT CHANGE UP (ref 19–24)
CO2 BLDA-SCNC: 23 MMOL/L — SIGNIFICANT CHANGE UP (ref 19–24)
CO2 BLDA-SCNC: 24 MMOL/L — SIGNIFICANT CHANGE UP (ref 19–24)
CO2 BLDA-SCNC: 24 MMOL/L — SIGNIFICANT CHANGE UP (ref 19–24)
CO2 BLDA-SCNC: 25 MMOL/L — HIGH (ref 19–24)
CO2 BLDA-SCNC: 26 MMOL/L — HIGH (ref 19–24)
CO2 BLDA-SCNC: 28 MMOL/L — HIGH (ref 19–24)
CO2 BLDA-SCNC: 29 MMOL/L — HIGH (ref 19–24)
CO2 BLDA-SCNC: 29 MMOL/L — HIGH (ref 19–24)
CO2 BLDA-SCNC: 30 MMOL/L — HIGH (ref 19–24)
CO2 BLDA-SCNC: 32 MMOL/L — HIGH (ref 19–24)
CO2 BLDA-SCNC: 9 MMOL/L — SIGNIFICANT CHANGE UP (ref 19–24)
CO2 BLDV-SCNC: 25 MMOL/L — SIGNIFICANT CHANGE UP (ref 22–26)
CO2 BLDV-SCNC: 28.5 MMOL/L — HIGH (ref 22–26)
CO2 BLDV-SCNC: 29.9 MMOL/L — HIGH (ref 22–26)
CO2 BLDV-SCNC: 30.6 MMOL/L — HIGH (ref 22–26)
CO2 SERPL-SCNC: 13 MMOL/L — LOW (ref 22–31)
CO2 SERPL-SCNC: 14 MMOL/L — LOW (ref 22–31)
CO2 SERPL-SCNC: 16 MMOL/L — LOW (ref 22–31)
CO2 SERPL-SCNC: 16 MMOL/L — LOW (ref 22–31)
CO2 SERPL-SCNC: 17 MMOL/L — LOW (ref 22–31)
CO2 SERPL-SCNC: 18 MMOL/L — LOW (ref 22–31)
CO2 SERPL-SCNC: 19 MMOL/L — LOW (ref 22–31)
CO2 SERPL-SCNC: 20 MMOL/L — LOW (ref 22–31)
CO2 SERPL-SCNC: 21 MMOL/L — LOW (ref 22–31)
CO2 SERPL-SCNC: 22 MMOL/L
CO2 SERPL-SCNC: 22 MMOL/L — SIGNIFICANT CHANGE UP (ref 22–31)
CO2 SERPL-SCNC: 23 MMOL/L
CO2 SERPL-SCNC: 23 MMOL/L
CO2 SERPL-SCNC: 23 MMOL/L — SIGNIFICANT CHANGE UP (ref 22–31)
CO2 SERPL-SCNC: 24 MMOL/L — SIGNIFICANT CHANGE UP (ref 22–31)
CO2 SERPL-SCNC: 25 MMOL/L
CO2 SERPL-SCNC: 25 MMOL/L — SIGNIFICANT CHANGE UP (ref 22–31)
CO2 SERPL-SCNC: 26 MMOL/L — SIGNIFICANT CHANGE UP (ref 22–31)
CO2 SERPL-SCNC: 27 MMOL/L — SIGNIFICANT CHANGE UP (ref 22–31)
CO2 SERPL-SCNC: 28 MMOL/L — SIGNIFICANT CHANGE UP (ref 22–31)
CO2 SERPL-SCNC: 29 MMOL/L — SIGNIFICANT CHANGE UP (ref 22–31)
CO2 SERPL-SCNC: 30 MMOL/L — SIGNIFICANT CHANGE UP (ref 22–31)
CO2 SERPL-SCNC: 30 MMOL/L — SIGNIFICANT CHANGE UP (ref 22–31)
CO2 SERPL-SCNC: 31 MMOL/L — SIGNIFICANT CHANGE UP (ref 22–31)
CO2 SERPL-SCNC: 8 MMOL/L — CRITICAL LOW (ref 22–31)
CO2 SERPL-SCNC: SIGNIFICANT CHANGE UP MMOL/L (ref 22–31)
COCAINE METAB.OTHER UR-MCNC: NEGATIVE — SIGNIFICANT CHANGE UP
COCAINE METAB.OTHER UR-MCNC: NEGATIVE — SIGNIFICANT CHANGE UP
COHGB MFR BLDA: 0.9 % — SIGNIFICANT CHANGE UP
COHGB MFR BLDV: 1.4 % — SIGNIFICANT CHANGE UP
COLOR SPEC: ABNORMAL
COLOR SPEC: YELLOW — SIGNIFICANT CHANGE UP
COLOR: YELLOW
COMMENT - URINE: SIGNIFICANT CHANGE UP
COMMENT - URINE: SIGNIFICANT CHANGE UP
CORTIS AM PEAK SERPL-MCNC: 12.33 UG/DL — SIGNIFICANT CHANGE UP (ref 6.02–18.4)
CORTIS AM PEAK SERPL-MCNC: 16.79 UG/DL — SIGNIFICANT CHANGE UP (ref 6.02–18.4)
CORTIS AM PEAK SERPL-MCNC: 6.96 UG/DL — SIGNIFICANT CHANGE UP (ref 6.02–18.4)
CORTIS AM PEAK SERPL-MCNC: 7.92 UG/DL — SIGNIFICANT CHANGE UP (ref 6.02–18.4)
CREAT ?TM UR-MCNC: 26 MG/DL — SIGNIFICANT CHANGE UP
CREAT ?TM UR-MCNC: 30 MG/DL — SIGNIFICANT CHANGE UP
CREAT ?TM UR-MCNC: 37 MG/DL — SIGNIFICANT CHANGE UP
CREAT ?TM UR-MCNC: 45 MG/DL — SIGNIFICANT CHANGE UP
CREAT ?TM UR-MCNC: 79 MG/DL — SIGNIFICANT CHANGE UP
CREAT SERPL-MCNC: 0.39 MG/DL — LOW (ref 0.5–1.3)
CREAT SERPL-MCNC: 0.39 MG/DL — LOW (ref 0.5–1.3)
CREAT SERPL-MCNC: 0.4 MG/DL — LOW (ref 0.5–1.3)
CREAT SERPL-MCNC: 0.41 MG/DL — LOW (ref 0.5–1.3)
CREAT SERPL-MCNC: 0.41 MG/DL — LOW (ref 0.5–1.3)
CREAT SERPL-MCNC: 0.43 MG/DL — LOW (ref 0.5–1.3)
CREAT SERPL-MCNC: 0.44 MG/DL — LOW (ref 0.5–1.3)
CREAT SERPL-MCNC: 0.45 MG/DL — LOW (ref 0.5–1.3)
CREAT SERPL-MCNC: 0.45 MG/DL — LOW (ref 0.5–1.3)
CREAT SERPL-MCNC: 0.46 MG/DL — LOW (ref 0.5–1.3)
CREAT SERPL-MCNC: 0.47 MG/DL — LOW (ref 0.5–1.3)
CREAT SERPL-MCNC: 0.47 MG/DL — LOW (ref 0.5–1.3)
CREAT SERPL-MCNC: 0.48 MG/DL — LOW (ref 0.5–1.3)
CREAT SERPL-MCNC: 0.48 MG/DL — LOW (ref 0.5–1.3)
CREAT SERPL-MCNC: 0.5 MG/DL — SIGNIFICANT CHANGE UP (ref 0.5–1.3)
CREAT SERPL-MCNC: 0.5 MG/DL — SIGNIFICANT CHANGE UP (ref 0.5–1.3)
CREAT SERPL-MCNC: 0.51 MG/DL — SIGNIFICANT CHANGE UP (ref 0.5–1.3)
CREAT SERPL-MCNC: 0.52 MG/DL — SIGNIFICANT CHANGE UP (ref 0.5–1.3)
CREAT SERPL-MCNC: 0.52 MG/DL — SIGNIFICANT CHANGE UP (ref 0.5–1.3)
CREAT SERPL-MCNC: 0.53 MG/DL — SIGNIFICANT CHANGE UP (ref 0.5–1.3)
CREAT SERPL-MCNC: 0.54 MG/DL — SIGNIFICANT CHANGE UP (ref 0.5–1.3)
CREAT SERPL-MCNC: 0.55 MG/DL — SIGNIFICANT CHANGE UP (ref 0.5–1.3)
CREAT SERPL-MCNC: 0.56 MG/DL — SIGNIFICANT CHANGE UP (ref 0.5–1.3)
CREAT SERPL-MCNC: 0.57 MG/DL — SIGNIFICANT CHANGE UP (ref 0.5–1.3)
CREAT SERPL-MCNC: 0.58 MG/DL — SIGNIFICANT CHANGE UP (ref 0.5–1.3)
CREAT SERPL-MCNC: 0.59 MG/DL — SIGNIFICANT CHANGE UP (ref 0.5–1.3)
CREAT SERPL-MCNC: 0.6 MG/DL — SIGNIFICANT CHANGE UP (ref 0.5–1.3)
CREAT SERPL-MCNC: 0.61 MG/DL — SIGNIFICANT CHANGE UP (ref 0.5–1.3)
CREAT SERPL-MCNC: 0.62 MG/DL — SIGNIFICANT CHANGE UP (ref 0.5–1.3)
CREAT SERPL-MCNC: 0.63 MG/DL — SIGNIFICANT CHANGE UP (ref 0.5–1.3)
CREAT SERPL-MCNC: 0.64 MG/DL — SIGNIFICANT CHANGE UP (ref 0.5–1.3)
CREAT SERPL-MCNC: 0.65 MG/DL — SIGNIFICANT CHANGE UP (ref 0.5–1.3)
CREAT SERPL-MCNC: 0.66 MG/DL — SIGNIFICANT CHANGE UP (ref 0.5–1.3)
CREAT SERPL-MCNC: 0.66 MG/DL — SIGNIFICANT CHANGE UP (ref 0.5–1.3)
CREAT SERPL-MCNC: 0.67 MG/DL — SIGNIFICANT CHANGE UP (ref 0.5–1.3)
CREAT SERPL-MCNC: 0.68 MG/DL — SIGNIFICANT CHANGE UP (ref 0.5–1.3)
CREAT SERPL-MCNC: 0.69 MG/DL — SIGNIFICANT CHANGE UP (ref 0.5–1.3)
CREAT SERPL-MCNC: 0.7 MG/DL — SIGNIFICANT CHANGE UP (ref 0.5–1.3)
CREAT SERPL-MCNC: 0.71 MG/DL — SIGNIFICANT CHANGE UP (ref 0.5–1.3)
CREAT SERPL-MCNC: 0.71 MG/DL — SIGNIFICANT CHANGE UP (ref 0.5–1.3)
CREAT SERPL-MCNC: 0.72 MG/DL — SIGNIFICANT CHANGE UP (ref 0.5–1.3)
CREAT SERPL-MCNC: 0.73 MG/DL
CREAT SERPL-MCNC: 0.73 MG/DL — SIGNIFICANT CHANGE UP (ref 0.5–1.3)
CREAT SERPL-MCNC: 0.76 MG/DL
CREAT SERPL-MCNC: 0.76 MG/DL — SIGNIFICANT CHANGE UP (ref 0.5–1.3)
CREAT SERPL-MCNC: 0.77 MG/DL — SIGNIFICANT CHANGE UP (ref 0.5–1.3)
CREAT SERPL-MCNC: 0.77 MG/DL — SIGNIFICANT CHANGE UP (ref 0.5–1.3)
CREAT SERPL-MCNC: 0.78 MG/DL — SIGNIFICANT CHANGE UP (ref 0.5–1.3)
CREAT SERPL-MCNC: 0.78 MG/DL — SIGNIFICANT CHANGE UP (ref 0.5–1.3)
CREAT SERPL-MCNC: 0.81 MG/DL — SIGNIFICANT CHANGE UP (ref 0.5–1.3)
CREAT SERPL-MCNC: 0.82 MG/DL — SIGNIFICANT CHANGE UP (ref 0.5–1.3)
CREAT SERPL-MCNC: 0.82 MG/DL — SIGNIFICANT CHANGE UP (ref 0.5–1.3)
CREAT SERPL-MCNC: 0.83 MG/DL — SIGNIFICANT CHANGE UP (ref 0.5–1.3)
CREAT SERPL-MCNC: 0.84 MG/DL
CREAT SERPL-MCNC: 0.87 MG/DL — SIGNIFICANT CHANGE UP (ref 0.5–1.3)
CREAT SERPL-MCNC: 0.88 MG/DL — SIGNIFICANT CHANGE UP (ref 0.5–1.3)
CREAT SERPL-MCNC: 0.88 MG/DL — SIGNIFICANT CHANGE UP (ref 0.5–1.3)
CREAT SERPL-MCNC: 0.89 MG/DL — SIGNIFICANT CHANGE UP (ref 0.5–1.3)
CREAT SERPL-MCNC: 0.9 MG/DL
CREAT SERPL-MCNC: 0.9 MG/DL — SIGNIFICANT CHANGE UP (ref 0.5–1.3)
CREAT SERPL-MCNC: 0.9 MG/DL — SIGNIFICANT CHANGE UP (ref 0.5–1.3)
CREAT SERPL-MCNC: 0.91 MG/DL — SIGNIFICANT CHANGE UP (ref 0.5–1.3)
CREAT SERPL-MCNC: 0.94 MG/DL — SIGNIFICANT CHANGE UP (ref 0.5–1.3)
CREAT SERPL-MCNC: 0.95 MG/DL — SIGNIFICANT CHANGE UP (ref 0.5–1.3)
CREAT SERPL-MCNC: 1.03 MG/DL — SIGNIFICANT CHANGE UP (ref 0.5–1.3)
CREAT SERPL-MCNC: 1.06 MG/DL — SIGNIFICANT CHANGE UP (ref 0.5–1.3)
CREAT SERPL-MCNC: SIGNIFICANT CHANGE UP MG/DL (ref 0.5–1.3)
CULTURE RESULTS: SIGNIFICANT CHANGE UP
D DIMER BLD IA.RAPID-MCNC: 224 NG/ML DDU — SIGNIFICANT CHANGE UP
D DIMER BLD IA.RAPID-MCNC: 553 NG/ML DDU — HIGH
D DIMER BLD IA.RAPID-MCNC: 698 NG/ML DDU — HIGH
D DIMER BLD IA.RAPID-MCNC: 869 NG/ML DDU — HIGH
D DIMER BLD IA.RAPID-MCNC: 897 NG/ML DDU — HIGH
DACRYOCYTES BLD QL SMEAR: SLIGHT — SIGNIFICANT CHANGE UP
DAT POLY-SP REAG RBC QL: NEGATIVE — SIGNIFICANT CHANGE UP
DIFF PNL FLD: ABNORMAL
DIFF PNL FLD: NEGATIVE — SIGNIFICANT CHANGE UP
E CLOAC COMP DNA BLD POS QL NAA+PROBE: SIGNIFICANT CHANGE UP
E COLI DNA BLD POS QL NAA+NON-PROBE: SIGNIFICANT CHANGE UP
E FAECALIS DNA BLD POS QL NAA+NON-PROBE: SIGNIFICANT CHANGE UP
E FAECIUM DNA BLD POS QL NAA+NON-PROBE: SIGNIFICANT CHANGE UP
E FAECIUM DNA BLD POS QL NAA+NON-PROBE: SIGNIFICANT CHANGE UP
EGFR: 100 ML/MIN/1.73M2
EGFR: 100 ML/MIN/1.73M2 — SIGNIFICANT CHANGE UP
EGFR: 100 ML/MIN/1.73M2 — SIGNIFICANT CHANGE UP
EGFR: 101 ML/MIN/1.73M2 — SIGNIFICANT CHANGE UP
EGFR: 102 ML/MIN/1.73M2 — SIGNIFICANT CHANGE UP
EGFR: 103 ML/MIN/1.73M2 — SIGNIFICANT CHANGE UP
EGFR: 104 ML/MIN/1.73M2 — SIGNIFICANT CHANGE UP
EGFR: 105 ML/MIN/1.73M2 — SIGNIFICANT CHANGE UP
EGFR: 106 ML/MIN/1.73M2 — SIGNIFICANT CHANGE UP
EGFR: 107 ML/MIN/1.73M2 — SIGNIFICANT CHANGE UP
EGFR: 108 ML/MIN/1.73M2 — SIGNIFICANT CHANGE UP
EGFR: 109 ML/MIN/1.73M2 — SIGNIFICANT CHANGE UP
EGFR: 110 ML/MIN/1.73M2 — SIGNIFICANT CHANGE UP
EGFR: 111 ML/MIN/1.73M2 — SIGNIFICANT CHANGE UP
EGFR: 112 ML/MIN/1.73M2 — SIGNIFICANT CHANGE UP
EGFR: 112 ML/MIN/1.73M2 — SIGNIFICANT CHANGE UP
EGFR: 113 ML/MIN/1.73M2 — SIGNIFICANT CHANGE UP
EGFR: 113 ML/MIN/1.73M2 — SIGNIFICANT CHANGE UP
EGFR: 114 ML/MIN/1.73M2 — SIGNIFICANT CHANGE UP
EGFR: 114 ML/MIN/1.73M2 — SIGNIFICANT CHANGE UP
EGFR: 115 ML/MIN/1.73M2 — SIGNIFICANT CHANGE UP
EGFR: 116 ML/MIN/1.73M2 — SIGNIFICANT CHANGE UP
EGFR: 117 ML/MIN/1.73M2 — SIGNIFICANT CHANGE UP
EGFR: 119 ML/MIN/1.73M2 — SIGNIFICANT CHANGE UP
EGFR: 119 ML/MIN/1.73M2 — SIGNIFICANT CHANGE UP
EGFR: 120 ML/MIN/1.73M2 — SIGNIFICANT CHANGE UP
EGFR: 77 ML/MIN/1.73M2 — SIGNIFICANT CHANGE UP
EGFR: 80 ML/MIN/1.73M2 — SIGNIFICANT CHANGE UP
EGFR: 88 ML/MIN/1.73M2 — SIGNIFICANT CHANGE UP
EGFR: 88 ML/MIN/1.73M2 — SIGNIFICANT CHANGE UP
EGFR: 92 ML/MIN/1.73M2 — SIGNIFICANT CHANGE UP
EGFR: 94 ML/MIN/1.73M2
EGFR: 94 ML/MIN/1.73M2 — SIGNIFICANT CHANGE UP
EGFR: 95 ML/MIN/1.73M2 — SIGNIFICANT CHANGE UP
EGFR: 96 ML/MIN/1.73M2
EGFR: 96 ML/MIN/1.73M2 — SIGNIFICANT CHANGE UP
EGFR: 97 ML/MIN/1.73M2 — SIGNIFICANT CHANGE UP
EGFR: 98 ML/MIN/1.73M2 — SIGNIFICANT CHANGE UP
EGFR: 99 ML/MIN/1.73M2
EGFR: 99 ML/MIN/1.73M2 — SIGNIFICANT CHANGE UP
ELLIPTOCYTES BLD QL SMEAR: SLIGHT — SIGNIFICANT CHANGE UP
ELLIPTOCYTES BLD QL SMEAR: SLIGHT — SIGNIFICANT CHANGE UP
EOSINOPHIL # BLD AUTO: 0 K/UL — SIGNIFICANT CHANGE UP (ref 0–0.5)
EOSINOPHIL # BLD AUTO: 0.03 K/UL — SIGNIFICANT CHANGE UP (ref 0–0.5)
EOSINOPHIL # BLD AUTO: 0.04 K/UL — SIGNIFICANT CHANGE UP (ref 0–0.5)
EOSINOPHIL # BLD AUTO: 0.06 K/UL — SIGNIFICANT CHANGE UP (ref 0–0.5)
EOSINOPHIL # BLD AUTO: 0.07 K/UL — SIGNIFICANT CHANGE UP (ref 0–0.5)
EOSINOPHIL # BLD AUTO: 0.15 K/UL — SIGNIFICANT CHANGE UP (ref 0–0.5)
EOSINOPHIL # BLD AUTO: 0.36 K/UL — SIGNIFICANT CHANGE UP (ref 0–0.5)
EOSINOPHIL # BLD AUTO: 0.39 K/UL
EOSINOPHIL # BLD AUTO: 0.55 K/UL
EOSINOPHIL # BLD AUTO: 0.56 K/UL
EOSINOPHIL # BLD AUTO: 0.62 K/UL
EOSINOPHIL # BLD AUTO: 1.08 K/UL
EOSINOPHIL # BLD AUTO: 1.27 K/UL
EOSINOPHIL NFR BLD AUTO: 0 % — SIGNIFICANT CHANGE UP (ref 0–6)
EOSINOPHIL NFR BLD AUTO: 0.2 % — SIGNIFICANT CHANGE UP (ref 0–6)
EOSINOPHIL NFR BLD AUTO: 0.9 % — SIGNIFICANT CHANGE UP (ref 0–6)
EOSINOPHIL NFR BLD AUTO: 1.7 % — SIGNIFICANT CHANGE UP (ref 0–6)
EOSINOPHIL NFR BLD AUTO: 1.7 % — SIGNIFICANT CHANGE UP (ref 0–6)
EOSINOPHIL NFR BLD AUTO: 1.8 % — SIGNIFICANT CHANGE UP (ref 0–6)
EOSINOPHIL NFR BLD AUTO: 10.5 %
EOSINOPHIL NFR BLD AUTO: 11 % — HIGH (ref 0–6)
EOSINOPHIL NFR BLD AUTO: 11.2 %
EOSINOPHIL NFR BLD AUTO: 11.6 %
EOSINOPHIL NFR BLD AUTO: 13 %
EOSINOPHIL NFR BLD AUTO: 18.3 %
EOSINOPHIL NFR BLD AUTO: 2 % — SIGNIFICANT CHANGE UP (ref 0–6)
EOSINOPHIL NFR BLD AUTO: 2.6 % — SIGNIFICANT CHANGE UP (ref 0–6)
EOSINOPHIL NFR BLD AUTO: 8.9 %
EPI CELLS # UR: SIGNIFICANT CHANGE UP /HPF (ref 0–5)
ESTIMATED AVERAGE GLUCOSE: 114 MG/DL
ESTIMATED AVERAGE GLUCOSE: 117 MG/DL — HIGH (ref 68–114)
ESTIMATED AVERAGE GLUCOSE: 120 MG/DL — HIGH (ref 68–114)
ESTIMATED AVERAGE GLUCOSE: 123 MG/DL — HIGH (ref 68–114)
ESTIMATED AVERAGE GLUCOSE: 131 MG/DL
FERRITIN SERPL-MCNC: 195 NG/ML — SIGNIFICANT CHANGE UP (ref 30–400)
FERRITIN SERPL-MCNC: 204 NG/ML — SIGNIFICANT CHANGE UP (ref 30–400)
FERRITIN SERPL-MCNC: 674 NG/ML — HIGH (ref 30–400)
FERRITIN SERPL-MCNC: 77 NG/ML
FERRITIN SERPL-MCNC: 99 NG/ML — SIGNIFICANT CHANGE UP (ref 30–400)
FIBRINOGEN PPP-MCNC: 389 MG/DL — SIGNIFICANT CHANGE UP (ref 200–445)
FIBRINOGEN PPP-MCNC: 397 MG/DL — SIGNIFICANT CHANGE UP (ref 200–445)
FIBRINOGEN PPP-MCNC: 502 MG/DL — HIGH (ref 200–445)
FIBRINOGEN PPP-MCNC: 602 MG/DL — HIGH (ref 258–438)
FIBRINOGEN PPP-MCNC: 622 MG/DL — HIGH (ref 258–438)
FOLATE SERPL-MCNC: 15.7 NG/ML
FOLATE SERPL-MCNC: 6.6 NG/ML — SIGNIFICANT CHANGE UP
FOLATE SERPL-MCNC: 6.8 NG/ML — SIGNIFICANT CHANGE UP
FOLATE SERPL-MCNC: 7.2 NG/ML — SIGNIFICANT CHANGE UP
FOLATE SERPL-MCNC: 7.8 NG/ML — SIGNIFICANT CHANGE UP
FSP PPP-MCNC: >=5 <20 UG/ML
GAS PNL BLDA: SIGNIFICANT CHANGE UP
GAS PNL BLDV: 125 MMOL/L — LOW (ref 136–145)
GAS PNL BLDV: 126 MMOL/L — LOW (ref 136–145)
GAS PNL BLDV: 126 MMOL/L — LOW (ref 136–145)
GAS PNL BLDV: 127 MMOL/L — LOW (ref 136–145)
GAS PNL BLDV: SIGNIFICANT CHANGE UP
GIANT PLATELETS BLD QL SMEAR: PRESENT — SIGNIFICANT CHANGE UP
GIANT PLATELETS BLD QL SMEAR: PRESENT — SIGNIFICANT CHANGE UP
GLUCOSE BLDA-MCNC: 183 MG/DL — HIGH (ref 70–99)
GLUCOSE BLDC GLUCOMTR-MCNC: 100 MG/DL — HIGH (ref 70–99)
GLUCOSE BLDC GLUCOMTR-MCNC: 100 MG/DL — HIGH (ref 70–99)
GLUCOSE BLDC GLUCOMTR-MCNC: 104 MG/DL — HIGH (ref 70–99)
GLUCOSE BLDC GLUCOMTR-MCNC: 105 MG/DL — HIGH (ref 70–99)
GLUCOSE BLDC GLUCOMTR-MCNC: 106 MG/DL — HIGH (ref 70–99)
GLUCOSE BLDC GLUCOMTR-MCNC: 106 MG/DL — HIGH (ref 70–99)
GLUCOSE BLDC GLUCOMTR-MCNC: 107 MG/DL — HIGH (ref 70–99)
GLUCOSE BLDC GLUCOMTR-MCNC: 108 MG/DL — HIGH (ref 70–99)
GLUCOSE BLDC GLUCOMTR-MCNC: 108 MG/DL — HIGH (ref 70–99)
GLUCOSE BLDC GLUCOMTR-MCNC: 109 MG/DL — HIGH (ref 70–99)
GLUCOSE BLDC GLUCOMTR-MCNC: 110 MG/DL — HIGH (ref 70–99)
GLUCOSE BLDC GLUCOMTR-MCNC: 111 MG/DL — HIGH (ref 70–99)
GLUCOSE BLDC GLUCOMTR-MCNC: 112 MG/DL — HIGH (ref 70–99)
GLUCOSE BLDC GLUCOMTR-MCNC: 113 MG/DL — HIGH (ref 70–99)
GLUCOSE BLDC GLUCOMTR-MCNC: 114
GLUCOSE BLDC GLUCOMTR-MCNC: 115 MG/DL — HIGH (ref 70–99)
GLUCOSE BLDC GLUCOMTR-MCNC: 116 MG/DL — HIGH (ref 70–99)
GLUCOSE BLDC GLUCOMTR-MCNC: 117 MG/DL — HIGH (ref 70–99)
GLUCOSE BLDC GLUCOMTR-MCNC: 118 MG/DL — HIGH (ref 70–99)
GLUCOSE BLDC GLUCOMTR-MCNC: 118 MG/DL — HIGH (ref 70–99)
GLUCOSE BLDC GLUCOMTR-MCNC: 119 MG/DL — HIGH (ref 70–99)
GLUCOSE BLDC GLUCOMTR-MCNC: 121 MG/DL — HIGH (ref 70–99)
GLUCOSE BLDC GLUCOMTR-MCNC: 122 MG/DL — HIGH (ref 70–99)
GLUCOSE BLDC GLUCOMTR-MCNC: 122 MG/DL — HIGH (ref 70–99)
GLUCOSE BLDC GLUCOMTR-MCNC: 123 MG/DL — HIGH (ref 70–99)
GLUCOSE BLDC GLUCOMTR-MCNC: 124 MG/DL — HIGH (ref 70–99)
GLUCOSE BLDC GLUCOMTR-MCNC: 124 MG/DL — HIGH (ref 70–99)
GLUCOSE BLDC GLUCOMTR-MCNC: 125 MG/DL — HIGH (ref 70–99)
GLUCOSE BLDC GLUCOMTR-MCNC: 127 MG/DL — HIGH (ref 70–99)
GLUCOSE BLDC GLUCOMTR-MCNC: 127 MG/DL — HIGH (ref 70–99)
GLUCOSE BLDC GLUCOMTR-MCNC: 129 MG/DL — HIGH (ref 70–99)
GLUCOSE BLDC GLUCOMTR-MCNC: 130 MG/DL — HIGH (ref 70–99)
GLUCOSE BLDC GLUCOMTR-MCNC: 130 MG/DL — HIGH (ref 70–99)
GLUCOSE BLDC GLUCOMTR-MCNC: 131 MG/DL — HIGH (ref 70–99)
GLUCOSE BLDC GLUCOMTR-MCNC: 131 MG/DL — HIGH (ref 70–99)
GLUCOSE BLDC GLUCOMTR-MCNC: 133 MG/DL — HIGH (ref 70–99)
GLUCOSE BLDC GLUCOMTR-MCNC: 133 MG/DL — HIGH (ref 70–99)
GLUCOSE BLDC GLUCOMTR-MCNC: 134 MG/DL — HIGH (ref 70–99)
GLUCOSE BLDC GLUCOMTR-MCNC: 136 MG/DL — HIGH (ref 70–99)
GLUCOSE BLDC GLUCOMTR-MCNC: 137 MG/DL — HIGH (ref 70–99)
GLUCOSE BLDC GLUCOMTR-MCNC: 138 MG/DL — HIGH (ref 70–99)
GLUCOSE BLDC GLUCOMTR-MCNC: 139 MG/DL — HIGH (ref 70–99)
GLUCOSE BLDC GLUCOMTR-MCNC: 140 MG/DL — HIGH (ref 70–99)
GLUCOSE BLDC GLUCOMTR-MCNC: 141 MG/DL — HIGH (ref 70–99)
GLUCOSE BLDC GLUCOMTR-MCNC: 142 MG/DL — HIGH (ref 70–99)
GLUCOSE BLDC GLUCOMTR-MCNC: 142 MG/DL — HIGH (ref 70–99)
GLUCOSE BLDC GLUCOMTR-MCNC: 143 MG/DL — HIGH (ref 70–99)
GLUCOSE BLDC GLUCOMTR-MCNC: 144 MG/DL — HIGH (ref 70–99)
GLUCOSE BLDC GLUCOMTR-MCNC: 144 MG/DL — HIGH (ref 70–99)
GLUCOSE BLDC GLUCOMTR-MCNC: 146 MG/DL — HIGH (ref 70–99)
GLUCOSE BLDC GLUCOMTR-MCNC: 147 MG/DL — HIGH (ref 70–99)
GLUCOSE BLDC GLUCOMTR-MCNC: 149 MG/DL — HIGH (ref 70–99)
GLUCOSE BLDC GLUCOMTR-MCNC: 153 MG/DL — HIGH (ref 70–99)
GLUCOSE BLDC GLUCOMTR-MCNC: 153 MG/DL — HIGH (ref 70–99)
GLUCOSE BLDC GLUCOMTR-MCNC: 154 MG/DL — HIGH (ref 70–99)
GLUCOSE BLDC GLUCOMTR-MCNC: 154 MG/DL — HIGH (ref 70–99)
GLUCOSE BLDC GLUCOMTR-MCNC: 156 MG/DL — HIGH (ref 70–99)
GLUCOSE BLDC GLUCOMTR-MCNC: 157 MG/DL — HIGH (ref 70–99)
GLUCOSE BLDC GLUCOMTR-MCNC: 158 MG/DL — HIGH (ref 70–99)
GLUCOSE BLDC GLUCOMTR-MCNC: 159 MG/DL — HIGH (ref 70–99)
GLUCOSE BLDC GLUCOMTR-MCNC: 161 MG/DL — HIGH (ref 70–99)
GLUCOSE BLDC GLUCOMTR-MCNC: 162 MG/DL — HIGH (ref 70–99)
GLUCOSE BLDC GLUCOMTR-MCNC: 162 MG/DL — HIGH (ref 70–99)
GLUCOSE BLDC GLUCOMTR-MCNC: 163 MG/DL — HIGH (ref 70–99)
GLUCOSE BLDC GLUCOMTR-MCNC: 164 MG/DL — HIGH (ref 70–99)
GLUCOSE BLDC GLUCOMTR-MCNC: 166 MG/DL — HIGH (ref 70–99)
GLUCOSE BLDC GLUCOMTR-MCNC: 167 MG/DL — HIGH (ref 70–99)
GLUCOSE BLDC GLUCOMTR-MCNC: 167 MG/DL — HIGH (ref 70–99)
GLUCOSE BLDC GLUCOMTR-MCNC: 168 MG/DL — HIGH (ref 70–99)
GLUCOSE BLDC GLUCOMTR-MCNC: 168 MG/DL — HIGH (ref 70–99)
GLUCOSE BLDC GLUCOMTR-MCNC: 169 MG/DL — HIGH (ref 70–99)
GLUCOSE BLDC GLUCOMTR-MCNC: 169 MG/DL — HIGH (ref 70–99)
GLUCOSE BLDC GLUCOMTR-MCNC: 170 MG/DL — HIGH (ref 70–99)
GLUCOSE BLDC GLUCOMTR-MCNC: 170 MG/DL — HIGH (ref 70–99)
GLUCOSE BLDC GLUCOMTR-MCNC: 171 MG/DL — HIGH (ref 70–99)
GLUCOSE BLDC GLUCOMTR-MCNC: 171 MG/DL — HIGH (ref 70–99)
GLUCOSE BLDC GLUCOMTR-MCNC: 173 MG/DL — HIGH (ref 70–99)
GLUCOSE BLDC GLUCOMTR-MCNC: 174 MG/DL — HIGH (ref 70–99)
GLUCOSE BLDC GLUCOMTR-MCNC: 174 MG/DL — HIGH (ref 70–99)
GLUCOSE BLDC GLUCOMTR-MCNC: 175 MG/DL — HIGH (ref 70–99)
GLUCOSE BLDC GLUCOMTR-MCNC: 176 MG/DL — HIGH (ref 70–99)
GLUCOSE BLDC GLUCOMTR-MCNC: 177 MG/DL — HIGH (ref 70–99)
GLUCOSE BLDC GLUCOMTR-MCNC: 178 MG/DL — HIGH (ref 70–99)
GLUCOSE BLDC GLUCOMTR-MCNC: 179 MG/DL — HIGH (ref 70–99)
GLUCOSE BLDC GLUCOMTR-MCNC: 179 MG/DL — HIGH (ref 70–99)
GLUCOSE BLDC GLUCOMTR-MCNC: 180 MG/DL — HIGH (ref 70–99)
GLUCOSE BLDC GLUCOMTR-MCNC: 180 MG/DL — HIGH (ref 70–99)
GLUCOSE BLDC GLUCOMTR-MCNC: 181 MG/DL — HIGH (ref 70–99)
GLUCOSE BLDC GLUCOMTR-MCNC: 183 MG/DL — HIGH (ref 70–99)
GLUCOSE BLDC GLUCOMTR-MCNC: 183 MG/DL — HIGH (ref 70–99)
GLUCOSE BLDC GLUCOMTR-MCNC: 184 MG/DL — HIGH (ref 70–99)
GLUCOSE BLDC GLUCOMTR-MCNC: 185 MG/DL — HIGH (ref 70–99)
GLUCOSE BLDC GLUCOMTR-MCNC: 186 MG/DL — HIGH (ref 70–99)
GLUCOSE BLDC GLUCOMTR-MCNC: 187 MG/DL — HIGH (ref 70–99)
GLUCOSE BLDC GLUCOMTR-MCNC: 188 MG/DL — HIGH (ref 70–99)
GLUCOSE BLDC GLUCOMTR-MCNC: 190 MG/DL — HIGH (ref 70–99)
GLUCOSE BLDC GLUCOMTR-MCNC: 191 MG/DL — HIGH (ref 70–99)
GLUCOSE BLDC GLUCOMTR-MCNC: 193 MG/DL — HIGH (ref 70–99)
GLUCOSE BLDC GLUCOMTR-MCNC: 194 MG/DL — HIGH (ref 70–99)
GLUCOSE BLDC GLUCOMTR-MCNC: 195 MG/DL — HIGH (ref 70–99)
GLUCOSE BLDC GLUCOMTR-MCNC: 197 MG/DL — HIGH (ref 70–99)
GLUCOSE BLDC GLUCOMTR-MCNC: 197 MG/DL — HIGH (ref 70–99)
GLUCOSE BLDC GLUCOMTR-MCNC: 199 MG/DL — HIGH (ref 70–99)
GLUCOSE BLDC GLUCOMTR-MCNC: 200 MG/DL — HIGH (ref 70–99)
GLUCOSE BLDC GLUCOMTR-MCNC: 201 MG/DL — HIGH (ref 70–99)
GLUCOSE BLDC GLUCOMTR-MCNC: 202 MG/DL — HIGH (ref 70–99)
GLUCOSE BLDC GLUCOMTR-MCNC: 203 MG/DL — HIGH (ref 70–99)
GLUCOSE BLDC GLUCOMTR-MCNC: 204 MG/DL — HIGH (ref 70–99)
GLUCOSE BLDC GLUCOMTR-MCNC: 205 MG/DL — HIGH (ref 70–99)
GLUCOSE BLDC GLUCOMTR-MCNC: 205 MG/DL — HIGH (ref 70–99)
GLUCOSE BLDC GLUCOMTR-MCNC: 206 MG/DL — HIGH (ref 70–99)
GLUCOSE BLDC GLUCOMTR-MCNC: 207 MG/DL — HIGH (ref 70–99)
GLUCOSE BLDC GLUCOMTR-MCNC: 208 MG/DL — HIGH (ref 70–99)
GLUCOSE BLDC GLUCOMTR-MCNC: 208 MG/DL — HIGH (ref 70–99)
GLUCOSE BLDC GLUCOMTR-MCNC: 210 MG/DL — HIGH (ref 70–99)
GLUCOSE BLDC GLUCOMTR-MCNC: 213 MG/DL — HIGH (ref 70–99)
GLUCOSE BLDC GLUCOMTR-MCNC: 214 MG/DL — HIGH (ref 70–99)
GLUCOSE BLDC GLUCOMTR-MCNC: 215 MG/DL — HIGH (ref 70–99)
GLUCOSE BLDC GLUCOMTR-MCNC: 217 MG/DL — HIGH (ref 70–99)
GLUCOSE BLDC GLUCOMTR-MCNC: 220 MG/DL — HIGH (ref 70–99)
GLUCOSE BLDC GLUCOMTR-MCNC: 221 MG/DL — HIGH (ref 70–99)
GLUCOSE BLDC GLUCOMTR-MCNC: 223 MG/DL — HIGH (ref 70–99)
GLUCOSE BLDC GLUCOMTR-MCNC: 224 MG/DL — HIGH (ref 70–99)
GLUCOSE BLDC GLUCOMTR-MCNC: 227 MG/DL — HIGH (ref 70–99)
GLUCOSE BLDC GLUCOMTR-MCNC: 229 MG/DL — HIGH (ref 70–99)
GLUCOSE BLDC GLUCOMTR-MCNC: 229 MG/DL — HIGH (ref 70–99)
GLUCOSE BLDC GLUCOMTR-MCNC: 230 MG/DL — HIGH (ref 70–99)
GLUCOSE BLDC GLUCOMTR-MCNC: 231 MG/DL — HIGH (ref 70–99)
GLUCOSE BLDC GLUCOMTR-MCNC: 234 MG/DL — HIGH (ref 70–99)
GLUCOSE BLDC GLUCOMTR-MCNC: 241 MG/DL — HIGH (ref 70–99)
GLUCOSE BLDC GLUCOMTR-MCNC: 242 MG/DL — HIGH (ref 70–99)
GLUCOSE BLDC GLUCOMTR-MCNC: 248 MG/DL — HIGH (ref 70–99)
GLUCOSE BLDC GLUCOMTR-MCNC: 249 MG/DL — HIGH (ref 70–99)
GLUCOSE BLDC GLUCOMTR-MCNC: 249 MG/DL — HIGH (ref 70–99)
GLUCOSE BLDC GLUCOMTR-MCNC: 250 MG/DL — HIGH (ref 70–99)
GLUCOSE BLDC GLUCOMTR-MCNC: 252 MG/DL — HIGH (ref 70–99)
GLUCOSE BLDC GLUCOMTR-MCNC: 256 MG/DL — HIGH (ref 70–99)
GLUCOSE BLDC GLUCOMTR-MCNC: 256 MG/DL — HIGH (ref 70–99)
GLUCOSE BLDC GLUCOMTR-MCNC: 258 MG/DL — HIGH (ref 70–99)
GLUCOSE BLDC GLUCOMTR-MCNC: 259 MG/DL — HIGH (ref 70–99)
GLUCOSE BLDC GLUCOMTR-MCNC: 265 MG/DL — HIGH (ref 70–99)
GLUCOSE BLDC GLUCOMTR-MCNC: 270 MG/DL — HIGH (ref 70–99)
GLUCOSE BLDC GLUCOMTR-MCNC: 272 MG/DL — HIGH (ref 70–99)
GLUCOSE BLDC GLUCOMTR-MCNC: 285 MG/DL — HIGH (ref 70–99)
GLUCOSE BLDC GLUCOMTR-MCNC: 295 MG/DL — HIGH (ref 70–99)
GLUCOSE BLDC GLUCOMTR-MCNC: 305 MG/DL — HIGH (ref 70–99)
GLUCOSE BLDC GLUCOMTR-MCNC: 313 MG/DL — HIGH (ref 70–99)
GLUCOSE BLDC GLUCOMTR-MCNC: 313 MG/DL — HIGH (ref 70–99)
GLUCOSE BLDC GLUCOMTR-MCNC: 329 MG/DL — HIGH (ref 70–99)
GLUCOSE BLDC GLUCOMTR-MCNC: 348 MG/DL — HIGH (ref 70–99)
GLUCOSE BLDC GLUCOMTR-MCNC: 37 MG/DL — CRITICAL LOW (ref 70–99)
GLUCOSE BLDC GLUCOMTR-MCNC: 379 MG/DL — HIGH (ref 70–99)
GLUCOSE BLDC GLUCOMTR-MCNC: 59 MG/DL — LOW (ref 70–99)
GLUCOSE BLDC GLUCOMTR-MCNC: 64 MG/DL — LOW (ref 70–99)
GLUCOSE BLDC GLUCOMTR-MCNC: 80 MG/DL — SIGNIFICANT CHANGE UP (ref 70–99)
GLUCOSE BLDC GLUCOMTR-MCNC: 83 MG/DL — SIGNIFICANT CHANGE UP (ref 70–99)
GLUCOSE BLDC GLUCOMTR-MCNC: 84 MG/DL — SIGNIFICANT CHANGE UP (ref 70–99)
GLUCOSE BLDC GLUCOMTR-MCNC: 84 MG/DL — SIGNIFICANT CHANGE UP (ref 70–99)
GLUCOSE BLDC GLUCOMTR-MCNC: 87 MG/DL — SIGNIFICANT CHANGE UP (ref 70–99)
GLUCOSE BLDC GLUCOMTR-MCNC: 87 MG/DL — SIGNIFICANT CHANGE UP (ref 70–99)
GLUCOSE BLDC GLUCOMTR-MCNC: 89 MG/DL — SIGNIFICANT CHANGE UP (ref 70–99)
GLUCOSE BLDC GLUCOMTR-MCNC: 90 MG/DL — SIGNIFICANT CHANGE UP (ref 70–99)
GLUCOSE BLDC GLUCOMTR-MCNC: 93 MG/DL — SIGNIFICANT CHANGE UP (ref 70–99)
GLUCOSE BLDC GLUCOMTR-MCNC: 93 MG/DL — SIGNIFICANT CHANGE UP (ref 70–99)
GLUCOSE BLDC GLUCOMTR-MCNC: 94 MG/DL — SIGNIFICANT CHANGE UP (ref 70–99)
GLUCOSE BLDC GLUCOMTR-MCNC: 95 MG/DL — SIGNIFICANT CHANGE UP (ref 70–99)
GLUCOSE BLDC GLUCOMTR-MCNC: 96 MG/DL — SIGNIFICANT CHANGE UP (ref 70–99)
GLUCOSE BLDC GLUCOMTR-MCNC: 97 MG/DL — SIGNIFICANT CHANGE UP (ref 70–99)
GLUCOSE BLDC GLUCOMTR-MCNC: 98 MG/DL — SIGNIFICANT CHANGE UP (ref 70–99)
GLUCOSE BLDC GLUCOMTR-MCNC: 98 MG/DL — SIGNIFICANT CHANGE UP (ref 70–99)
GLUCOSE BLDC GLUCOMTR-MCNC: 99 MG/DL — SIGNIFICANT CHANGE UP (ref 70–99)
GLUCOSE QUALITATIVE U: NEGATIVE
GLUCOSE SERPL-MCNC: 101 MG/DL — HIGH (ref 70–99)
GLUCOSE SERPL-MCNC: 111 MG/DL — HIGH (ref 70–99)
GLUCOSE SERPL-MCNC: 112 MG/DL — HIGH (ref 70–99)
GLUCOSE SERPL-MCNC: 112 MG/DL — HIGH (ref 70–99)
GLUCOSE SERPL-MCNC: 114 MG/DL — HIGH (ref 70–99)
GLUCOSE SERPL-MCNC: 115 MG/DL — HIGH (ref 70–99)
GLUCOSE SERPL-MCNC: 116 MG/DL — HIGH (ref 70–99)
GLUCOSE SERPL-MCNC: 117 MG/DL — HIGH (ref 70–99)
GLUCOSE SERPL-MCNC: 120 MG/DL — HIGH (ref 70–99)
GLUCOSE SERPL-MCNC: 122 MG/DL — HIGH (ref 70–99)
GLUCOSE SERPL-MCNC: 124 MG/DL — HIGH (ref 70–99)
GLUCOSE SERPL-MCNC: 126 MG/DL — HIGH (ref 70–99)
GLUCOSE SERPL-MCNC: 126 MG/DL — HIGH (ref 70–99)
GLUCOSE SERPL-MCNC: 127 MG/DL — HIGH (ref 70–99)
GLUCOSE SERPL-MCNC: 129 MG/DL — HIGH (ref 70–99)
GLUCOSE SERPL-MCNC: 131 MG/DL — HIGH (ref 70–99)
GLUCOSE SERPL-MCNC: 132 MG/DL — HIGH (ref 70–99)
GLUCOSE SERPL-MCNC: 133 MG/DL — HIGH (ref 70–99)
GLUCOSE SERPL-MCNC: 133 MG/DL — HIGH (ref 70–99)
GLUCOSE SERPL-MCNC: 135 MG/DL — HIGH (ref 70–99)
GLUCOSE SERPL-MCNC: 136 MG/DL
GLUCOSE SERPL-MCNC: 139 MG/DL — HIGH (ref 70–99)
GLUCOSE SERPL-MCNC: 139 MG/DL — HIGH (ref 70–99)
GLUCOSE SERPL-MCNC: 142 MG/DL — HIGH (ref 70–99)
GLUCOSE SERPL-MCNC: 143 MG/DL — HIGH (ref 70–99)
GLUCOSE SERPL-MCNC: 143 MG/DL — HIGH (ref 70–99)
GLUCOSE SERPL-MCNC: 146 MG/DL — HIGH (ref 70–99)
GLUCOSE SERPL-MCNC: 146 MG/DL — HIGH (ref 70–99)
GLUCOSE SERPL-MCNC: 147 MG/DL — HIGH (ref 70–99)
GLUCOSE SERPL-MCNC: 149 MG/DL — HIGH (ref 70–99)
GLUCOSE SERPL-MCNC: 150 MG/DL — HIGH (ref 70–99)
GLUCOSE SERPL-MCNC: 150 MG/DL — HIGH (ref 70–99)
GLUCOSE SERPL-MCNC: 151 MG/DL — HIGH (ref 70–99)
GLUCOSE SERPL-MCNC: 152 MG/DL — HIGH (ref 70–99)
GLUCOSE SERPL-MCNC: 152 MG/DL — HIGH (ref 70–99)
GLUCOSE SERPL-MCNC: 156 MG/DL — HIGH (ref 70–99)
GLUCOSE SERPL-MCNC: 157 MG/DL — HIGH (ref 70–99)
GLUCOSE SERPL-MCNC: 158 MG/DL — HIGH (ref 70–99)
GLUCOSE SERPL-MCNC: 158 MG/DL — HIGH (ref 70–99)
GLUCOSE SERPL-MCNC: 159 MG/DL — HIGH (ref 70–99)
GLUCOSE SERPL-MCNC: 160 MG/DL — HIGH (ref 70–99)
GLUCOSE SERPL-MCNC: 163 MG/DL — HIGH (ref 70–99)
GLUCOSE SERPL-MCNC: 165 MG/DL — HIGH (ref 70–99)
GLUCOSE SERPL-MCNC: 166 MG/DL — HIGH (ref 70–99)
GLUCOSE SERPL-MCNC: 166 MG/DL — HIGH (ref 70–99)
GLUCOSE SERPL-MCNC: 167 MG/DL — HIGH (ref 70–99)
GLUCOSE SERPL-MCNC: 167 MG/DL — HIGH (ref 70–99)
GLUCOSE SERPL-MCNC: 168 MG/DL — HIGH (ref 70–99)
GLUCOSE SERPL-MCNC: 169 MG/DL — HIGH (ref 70–99)
GLUCOSE SERPL-MCNC: 169 MG/DL — HIGH (ref 70–99)
GLUCOSE SERPL-MCNC: 171 MG/DL — HIGH (ref 70–99)
GLUCOSE SERPL-MCNC: 172 MG/DL — HIGH (ref 70–99)
GLUCOSE SERPL-MCNC: 174 MG/DL — HIGH (ref 70–99)
GLUCOSE SERPL-MCNC: 177 MG/DL — HIGH (ref 70–99)
GLUCOSE SERPL-MCNC: 179 MG/DL — HIGH (ref 70–99)
GLUCOSE SERPL-MCNC: 180 MG/DL — HIGH (ref 70–99)
GLUCOSE SERPL-MCNC: 181 MG/DL — HIGH (ref 70–99)
GLUCOSE SERPL-MCNC: 183 MG/DL — HIGH (ref 70–99)
GLUCOSE SERPL-MCNC: 184 MG/DL — HIGH (ref 70–99)
GLUCOSE SERPL-MCNC: 184 MG/DL — HIGH (ref 70–99)
GLUCOSE SERPL-MCNC: 185 MG/DL — HIGH (ref 70–99)
GLUCOSE SERPL-MCNC: 186 MG/DL — HIGH (ref 70–99)
GLUCOSE SERPL-MCNC: 187 MG/DL — HIGH (ref 70–99)
GLUCOSE SERPL-MCNC: 188 MG/DL — HIGH (ref 70–99)
GLUCOSE SERPL-MCNC: 188 MG/DL — HIGH (ref 70–99)
GLUCOSE SERPL-MCNC: 189 MG/DL — HIGH (ref 70–99)
GLUCOSE SERPL-MCNC: 189 MG/DL — HIGH (ref 70–99)
GLUCOSE SERPL-MCNC: 190 MG/DL — HIGH (ref 70–99)
GLUCOSE SERPL-MCNC: 192 MG/DL — HIGH (ref 70–99)
GLUCOSE SERPL-MCNC: 193 MG/DL — HIGH (ref 70–99)
GLUCOSE SERPL-MCNC: 197 MG/DL — HIGH (ref 70–99)
GLUCOSE SERPL-MCNC: 197 MG/DL — HIGH (ref 70–99)
GLUCOSE SERPL-MCNC: 198 MG/DL — HIGH (ref 70–99)
GLUCOSE SERPL-MCNC: 203 MG/DL — HIGH (ref 70–99)
GLUCOSE SERPL-MCNC: 204 MG/DL — HIGH (ref 70–99)
GLUCOSE SERPL-MCNC: 204 MG/DL — HIGH (ref 70–99)
GLUCOSE SERPL-MCNC: 205 MG/DL
GLUCOSE SERPL-MCNC: 205 MG/DL — HIGH (ref 70–99)
GLUCOSE SERPL-MCNC: 207 MG/DL — HIGH (ref 70–99)
GLUCOSE SERPL-MCNC: 208 MG/DL — HIGH (ref 70–99)
GLUCOSE SERPL-MCNC: 209 MG/DL — HIGH (ref 70–99)
GLUCOSE SERPL-MCNC: 214 MG/DL — HIGH (ref 70–99)
GLUCOSE SERPL-MCNC: 217 MG/DL — HIGH (ref 70–99)
GLUCOSE SERPL-MCNC: 218 MG/DL — HIGH (ref 70–99)
GLUCOSE SERPL-MCNC: 225 MG/DL — HIGH (ref 70–99)
GLUCOSE SERPL-MCNC: 228 MG/DL — HIGH (ref 70–99)
GLUCOSE SERPL-MCNC: 230 MG/DL — HIGH (ref 70–99)
GLUCOSE SERPL-MCNC: 231 MG/DL — HIGH (ref 70–99)
GLUCOSE SERPL-MCNC: 231 MG/DL — HIGH (ref 70–99)
GLUCOSE SERPL-MCNC: 235 MG/DL — HIGH (ref 70–99)
GLUCOSE SERPL-MCNC: 245 MG/DL — HIGH (ref 70–99)
GLUCOSE SERPL-MCNC: 248 MG/DL — HIGH (ref 70–99)
GLUCOSE SERPL-MCNC: 252 MG/DL — HIGH (ref 70–99)
GLUCOSE SERPL-MCNC: 258 MG/DL — HIGH (ref 70–99)
GLUCOSE SERPL-MCNC: 262 MG/DL — HIGH (ref 70–99)
GLUCOSE SERPL-MCNC: 264 MG/DL — HIGH (ref 70–99)
GLUCOSE SERPL-MCNC: 285 MG/DL — HIGH (ref 70–99)
GLUCOSE SERPL-MCNC: 323 MG/DL — HIGH (ref 70–99)
GLUCOSE SERPL-MCNC: 34 MG/DL — CRITICAL LOW (ref 70–99)
GLUCOSE SERPL-MCNC: 38 MG/DL — CRITICAL LOW (ref 70–99)
GLUCOSE SERPL-MCNC: 477 MG/DL — CRITICAL HIGH (ref 70–99)
GLUCOSE SERPL-MCNC: 65 MG/DL — LOW (ref 70–99)
GLUCOSE SERPL-MCNC: 90 MG/DL — SIGNIFICANT CHANGE UP (ref 70–99)
GLUCOSE SERPL-MCNC: 96 MG/DL
GLUCOSE SERPL-MCNC: 99 MG/DL
GLUCOSE UR QL: 100
GLUCOSE UR QL: 250
GLUCOSE UR QL: 500
GLUCOSE UR QL: >=1000
GLUCOSE UR QL: >=1000
GLUCOSE UR QL: NEGATIVE — SIGNIFICANT CHANGE UP
GLYCOPROTEIN IV ANTIBODY: NEGATIVE — SIGNIFICANT CHANGE UP
GP B STREP DNA BLD POS QL NAA+NON-PROBE: SIGNIFICANT CHANGE UP
GRAM STN FLD: SIGNIFICANT CHANGE UP
HAEM INFLU DNA BLD POS QL NAA+NON-PROBE: SIGNIFICANT CHANGE UP
HAPTOGLOB SERPL-MCNC: 127 MG/DL — SIGNIFICANT CHANGE UP (ref 34–200)
HAPTOGLOB SERPL-MCNC: 290 MG/DL — HIGH (ref 34–200)
HAPTOGLOB SERPL-MCNC: 301 MG/DL — HIGH (ref 34–200)
HAPTOGLOB SERPL-MCNC: 303 MG/DL — HIGH (ref 34–200)
HAPTOGLOB SERPL-MCNC: 303 MG/DL — HIGH (ref 34–200)
HAV IGM SER QL: NONREACTIVE
HAV IGM SER-ACNC: SIGNIFICANT CHANGE UP
HBA1C MFR BLD HPLC: 5.6 %
HBA1C MFR BLD HPLC: 5.9
HBA1C MFR BLD HPLC: 6.2 %
HBV CORE AB SER-ACNC: REACTIVE
HBV CORE IGM SER QL: NONREACTIVE
HBV CORE IGM SER-ACNC: SIGNIFICANT CHANGE UP
HBV DNA # SERPL NAA+PROBE: <10 IU/ML — SIGNIFICANT CHANGE UP
HBV DNA # SERPL NAA+PROBE: DETECTED
HBV DNA SERPL NAA+PROBE-LOG#: <1 LOGIU/ML — SIGNIFICANT CHANGE UP
HBV SURFACE AB SER-ACNC: SIGNIFICANT CHANGE UP
HBV SURFACE AB SERPL IA-ACNC: <3 MIU/ML
HBV SURFACE AG SER QL: REACTIVE
HBV SURFACE AG SER-ACNC: REACTIVE
HCO3 BLDA-SCNC: 11 MMOL/L — LOW (ref 21–28)
HCO3 BLDA-SCNC: 12 MMOL/L — LOW (ref 21–28)
HCO3 BLDA-SCNC: 14 MMOL/L — LOW (ref 21–28)
HCO3 BLDA-SCNC: 16 MMOL/L — LOW (ref 21–28)
HCO3 BLDA-SCNC: 16 MMOL/L — LOW (ref 21–28)
HCO3 BLDA-SCNC: 17 MMOL/L — LOW (ref 21–28)
HCO3 BLDA-SCNC: 17 MMOL/L — LOW (ref 21–28)
HCO3 BLDA-SCNC: 18 MMOL/L — LOW (ref 21–28)
HCO3 BLDA-SCNC: 19 MMOL/L — LOW (ref 21–28)
HCO3 BLDA-SCNC: 20 MMOL/L — LOW (ref 21–28)
HCO3 BLDA-SCNC: 20 MMOL/L — LOW (ref 21–28)
HCO3 BLDA-SCNC: 22 MMOL/L — SIGNIFICANT CHANGE UP (ref 21–28)
HCO3 BLDA-SCNC: 23 MMOL/L — SIGNIFICANT CHANGE UP (ref 21–28)
HCO3 BLDA-SCNC: 23 MMOL/L — SIGNIFICANT CHANGE UP (ref 21–28)
HCO3 BLDA-SCNC: 24 MMOL/L — SIGNIFICANT CHANGE UP (ref 21–28)
HCO3 BLDA-SCNC: 25 MMOL/L — SIGNIFICANT CHANGE UP (ref 21–28)
HCO3 BLDA-SCNC: 27 MMOL/L — SIGNIFICANT CHANGE UP (ref 21–28)
HCO3 BLDA-SCNC: 28 MMOL/L — SIGNIFICANT CHANGE UP (ref 21–28)
HCO3 BLDA-SCNC: 29 MMOL/L — HIGH (ref 21–28)
HCO3 BLDA-SCNC: 29 MMOL/L — HIGH (ref 21–28)
HCO3 BLDA-SCNC: 30 MMOL/L — HIGH (ref 21–28)
HCO3 BLDA-SCNC: 8 MMOL/L — LOW (ref 21–28)
HCO3 BLDV-SCNC: 20 MMOL/L — LOW (ref 22–29)
HCO3 BLDV-SCNC: 24 MMOL/L — SIGNIFICANT CHANGE UP (ref 22–29)
HCO3 BLDV-SCNC: 27 MMOL/L — SIGNIFICANT CHANGE UP (ref 22–29)
HCO3 BLDV-SCNC: 28 MMOL/L — SIGNIFICANT CHANGE UP (ref 22–29)
HCO3 BLDV-SCNC: 29 MMOL/L — SIGNIFICANT CHANGE UP (ref 22–29)
HCT VFR BLD CALC: 19.4 % — CRITICAL LOW (ref 39–50)
HCT VFR BLD CALC: 20.1 % — CRITICAL LOW (ref 39–50)
HCT VFR BLD CALC: 20.6 % — CRITICAL LOW (ref 39–50)
HCT VFR BLD CALC: 21 % — CRITICAL LOW (ref 39–50)
HCT VFR BLD CALC: 21.2 % — LOW (ref 39–50)
HCT VFR BLD CALC: 21.2 % — LOW (ref 39–50)
HCT VFR BLD CALC: 21.3 % — LOW (ref 39–50)
HCT VFR BLD CALC: 21.5 % — LOW (ref 39–50)
HCT VFR BLD CALC: 21.7 % — LOW (ref 39–50)
HCT VFR BLD CALC: 21.7 % — LOW (ref 39–50)
HCT VFR BLD CALC: 22 % — LOW (ref 39–50)
HCT VFR BLD CALC: 22.3 % — LOW (ref 39–50)
HCT VFR BLD CALC: 22.3 % — LOW (ref 39–50)
HCT VFR BLD CALC: 22.5 % — LOW (ref 39–50)
HCT VFR BLD CALC: 22.6 % — LOW (ref 39–50)
HCT VFR BLD CALC: 23 % — LOW (ref 39–50)
HCT VFR BLD CALC: 23.1 % — LOW (ref 39–50)
HCT VFR BLD CALC: 23.1 % — LOW (ref 39–50)
HCT VFR BLD CALC: 23.3 % — LOW (ref 39–50)
HCT VFR BLD CALC: 23.4 % — LOW (ref 39–50)
HCT VFR BLD CALC: 24.1 % — LOW (ref 39–50)
HCT VFR BLD CALC: 24.3 % — LOW (ref 39–50)
HCT VFR BLD CALC: 24.4 % — LOW (ref 39–50)
HCT VFR BLD CALC: 24.7 % — LOW (ref 39–50)
HCT VFR BLD CALC: 25 % — LOW (ref 39–50)
HCT VFR BLD CALC: 25 % — LOW (ref 39–50)
HCT VFR BLD CALC: 25.1 % — LOW (ref 39–50)
HCT VFR BLD CALC: 25.2 % — LOW (ref 39–50)
HCT VFR BLD CALC: 25.2 % — LOW (ref 39–50)
HCT VFR BLD CALC: 25.4 % — LOW (ref 39–50)
HCT VFR BLD CALC: 26.1 % — LOW (ref 39–50)
HCT VFR BLD CALC: 26.5 % — LOW (ref 39–50)
HCT VFR BLD CALC: 26.6 % — LOW (ref 39–50)
HCT VFR BLD CALC: 27 % — LOW (ref 39–50)
HCT VFR BLD CALC: 27.2 % — LOW (ref 39–50)
HCT VFR BLD CALC: 27.2 % — LOW (ref 39–50)
HCT VFR BLD CALC: 27.3 % — LOW (ref 39–50)
HCT VFR BLD CALC: 27.4 % — LOW (ref 39–50)
HCT VFR BLD CALC: 27.5 % — LOW (ref 39–50)
HCT VFR BLD CALC: 27.9 % — LOW (ref 39–50)
HCT VFR BLD CALC: 28 % — LOW (ref 39–50)
HCT VFR BLD CALC: 28 % — LOW (ref 39–50)
HCT VFR BLD CALC: 28.1 % — LOW (ref 39–50)
HCT VFR BLD CALC: 28.3 % — LOW (ref 39–50)
HCT VFR BLD CALC: 28.4 % — LOW (ref 39–50)
HCT VFR BLD CALC: 28.9 % — LOW (ref 39–50)
HCT VFR BLD CALC: 29.1 % — LOW (ref 39–50)
HCT VFR BLD CALC: 29.3 % — LOW (ref 39–50)
HCT VFR BLD CALC: 29.5 % — LOW (ref 39–50)
HCT VFR BLD CALC: 29.9 % — LOW (ref 39–50)
HCT VFR BLD CALC: 30.1 % — LOW (ref 39–50)
HCT VFR BLD CALC: 30.3 % — LOW (ref 39–50)
HCT VFR BLD CALC: 30.4 % — LOW (ref 39–50)
HCT VFR BLD CALC: 30.9 % — LOW (ref 39–50)
HCT VFR BLD CALC: 31.5 % — LOW (ref 39–50)
HCT VFR BLD CALC: 32 % — LOW (ref 39–50)
HCT VFR BLD CALC: 32.6 % — LOW (ref 39–50)
HCT VFR BLD CALC: 32.7 % — LOW (ref 39–50)
HCT VFR BLD CALC: 32.8 % — LOW (ref 39–50)
HCT VFR BLD CALC: 33.1 % — LOW (ref 39–50)
HCT VFR BLD CALC: 33.3 % — LOW (ref 39–50)
HCT VFR BLD CALC: 33.5 % — LOW (ref 39–50)
HCT VFR BLD CALC: 33.6 % — LOW (ref 39–50)
HCT VFR BLD CALC: 33.6 % — LOW (ref 39–50)
HCT VFR BLD CALC: 34.1 % — LOW (ref 39–50)
HCT VFR BLD CALC: 35.1 % — LOW (ref 39–50)
HCT VFR BLD CALC: 35.5 % — LOW (ref 39–50)
HCT VFR BLD CALC: 35.6 % — LOW (ref 39–50)
HCT VFR BLD CALC: 35.8 %
HCT VFR BLD CALC: 35.8 % — LOW (ref 39–50)
HCT VFR BLD CALC: 36.2 % — LOW (ref 39–50)
HCT VFR BLD CALC: 36.2 % — LOW (ref 39–50)
HCT VFR BLD CALC: 36.3 % — LOW (ref 39–50)
HCT VFR BLD CALC: 36.5 % — LOW (ref 39–50)
HCT VFR BLD CALC: 36.5 % — LOW (ref 39–50)
HCT VFR BLD CALC: 36.8 %
HCT VFR BLD CALC: 37.1 %
HCT VFR BLD CALC: 37.2 % — LOW (ref 39–50)
HCT VFR BLD CALC: 37.7 %
HCT VFR BLD CALC: 37.8 %
HCT VFR BLD CALC: 39.1 % — SIGNIFICANT CHANGE UP (ref 39–50)
HCT VFR BLD CALC: 39.4 % — SIGNIFICANT CHANGE UP (ref 39–50)
HCT VFR BLD CALC: 41.2 %
HCV AB S/CO SERPL IA: 0.05 S/CO — SIGNIFICANT CHANGE UP
HCV AB SER QL: NONREACTIVE
HCV AB SERPL-IMP: SIGNIFICANT CHANGE UP
HCV S/CO RATIO: 0.09 S/CO
HDLC SERPL-MCNC: 44 MG/DL
HDLC SERPL-MCNC: 49 MG/DL
HDLC SERPL-MCNC: 54 MG/DL — SIGNIFICANT CHANGE UP
HGB BLD CALC-MCNC: 9.7 G/DL — LOW (ref 12.6–17.4)
HGB BLD-MCNC: 10 G/DL — LOW (ref 13–17)
HGB BLD-MCNC: 10.1 G/DL — LOW (ref 13–17)
HGB BLD-MCNC: 10.2 G/DL — LOW (ref 13–17)
HGB BLD-MCNC: 10.4 G/DL — LOW (ref 13–17)
HGB BLD-MCNC: 10.4 G/DL — LOW (ref 13–17)
HGB BLD-MCNC: 10.5 G/DL — LOW (ref 13–17)
HGB BLD-MCNC: 10.8 G/DL — LOW (ref 13–17)
HGB BLD-MCNC: 10.9 G/DL — LOW (ref 13–17)
HGB BLD-MCNC: 11 G/DL — LOW (ref 13–17)
HGB BLD-MCNC: 11.1 G/DL — LOW (ref 13–17)
HGB BLD-MCNC: 11.4 G/DL
HGB BLD-MCNC: 11.4 G/DL — LOW (ref 13–17)
HGB BLD-MCNC: 11.5 G/DL — LOW (ref 13–17)
HGB BLD-MCNC: 11.7 G/DL — LOW (ref 13–17)
HGB BLD-MCNC: 11.7 G/DL — LOW (ref 13–17)
HGB BLD-MCNC: 11.8 G/DL
HGB BLD-MCNC: 11.9 G/DL — LOW (ref 13–17)
HGB BLD-MCNC: 11.9 G/DL — LOW (ref 13–17)
HGB BLD-MCNC: 12 G/DL — LOW (ref 13–17)
HGB BLD-MCNC: 12.1 G/DL — LOW (ref 13–17)
HGB BLD-MCNC: 12.1 G/DL — LOW (ref 13–17)
HGB BLD-MCNC: 12.2 G/DL — LOW (ref 13–17)
HGB BLD-MCNC: 12.3 G/DL
HGB BLD-MCNC: 12.3 G/DL — LOW (ref 13–17)
HGB BLD-MCNC: 12.4 G/DL
HGB BLD-MCNC: 12.5 G/DL
HGB BLD-MCNC: 12.5 G/DL — LOW (ref 13–17)
HGB BLD-MCNC: 12.5 G/DL — LOW (ref 13–17)
HGB BLD-MCNC: 12.6 G/DL — LOW (ref 13–17)
HGB BLD-MCNC: 12.7 G/DL — LOW (ref 13–17)
HGB BLD-MCNC: 12.8 G/DL
HGB BLD-MCNC: 12.8 G/DL — LOW (ref 13–17)
HGB BLD-MCNC: 12.9 G/DL — LOW (ref 13–17)
HGB BLD-MCNC: 12.9 G/DL — LOW (ref 13–17)
HGB BLD-MCNC: 6.5 G/DL — CRITICAL LOW (ref 13–17)
HGB BLD-MCNC: 6.7 G/DL — CRITICAL LOW (ref 13–17)
HGB BLD-MCNC: 6.8 G/DL — CRITICAL LOW (ref 13–17)
HGB BLD-MCNC: 6.8 G/DL — CRITICAL LOW (ref 13–17)
HGB BLD-MCNC: 7 G/DL — CRITICAL LOW (ref 13–17)
HGB BLD-MCNC: 7 G/DL — CRITICAL LOW (ref 13–17)
HGB BLD-MCNC: 7.1 G/DL — LOW (ref 13–17)
HGB BLD-MCNC: 7.1 G/DL — LOW (ref 13–17)
HGB BLD-MCNC: 7.2 G/DL — LOW (ref 13–17)
HGB BLD-MCNC: 7.2 G/DL — LOW (ref 13–17)
HGB BLD-MCNC: 7.3 G/DL — LOW (ref 13–17)
HGB BLD-MCNC: 7.3 G/DL — LOW (ref 13–17)
HGB BLD-MCNC: 7.4 G/DL — LOW (ref 13–17)
HGB BLD-MCNC: 7.5 G/DL — LOW (ref 13–17)
HGB BLD-MCNC: 7.5 G/DL — LOW (ref 13–17)
HGB BLD-MCNC: 7.6 G/DL — LOW (ref 13–17)
HGB BLD-MCNC: 7.7 G/DL — LOW (ref 13–17)
HGB BLD-MCNC: 7.9 G/DL — LOW (ref 13–17)
HGB BLD-MCNC: 7.9 G/DL — LOW (ref 13–17)
HGB BLD-MCNC: 8 G/DL — LOW (ref 13–17)
HGB BLD-MCNC: 8 G/DL — LOW (ref 13–17)
HGB BLD-MCNC: 8.1 G/DL — LOW (ref 13–17)
HGB BLD-MCNC: 8.2 G/DL — LOW (ref 13–17)
HGB BLD-MCNC: 8.4 G/DL — LOW (ref 13–17)
HGB BLD-MCNC: 8.5 G/DL — LOW (ref 13–17)
HGB BLD-MCNC: 8.6 G/DL — LOW (ref 13–17)
HGB BLD-MCNC: 8.7 G/DL — LOW (ref 13–17)
HGB BLD-MCNC: 8.8 G/DL — LOW (ref 13–17)
HGB BLD-MCNC: 8.9 G/DL — LOW (ref 13–17)
HGB BLD-MCNC: 9.1 G/DL — LOW (ref 13–17)
HGB BLD-MCNC: 9.1 G/DL — LOW (ref 13–17)
HGB BLD-MCNC: 9.2 G/DL — LOW (ref 13–17)
HGB BLD-MCNC: 9.2 G/DL — LOW (ref 13–17)
HGB BLD-MCNC: 9.3 G/DL — LOW (ref 13–17)
HGB BLD-MCNC: 9.4 G/DL — LOW (ref 13–17)
HGB BLD-MCNC: 9.5 G/DL — LOW (ref 13–17)
HGB BLD-MCNC: 9.5 G/DL — LOW (ref 13–17)
HGB BLD-MCNC: 9.7 G/DL — LOW (ref 13–17)
HGB BLD-MCNC: 9.7 G/DL — LOW (ref 13–17)
HGB BLD-MCNC: 9.8 G/DL — LOW (ref 13–17)
HGB BLD-MCNC: 9.8 G/DL — LOW (ref 13–17)
HGB BLD-MCNC: 9.9 G/DL — LOW (ref 13–17)
HGB BLDA-MCNC: 11.9 G/DL — LOW (ref 12.6–17.4)
HIV 1+2 AB+HIV1 P24 AG SERPL QL IA: SIGNIFICANT CHANGE UP
HLA AB SER QL IA: NEGATIVE — SIGNIFICANT CHANGE UP
HOROWITZ INDEX BLDA+IHG-RTO: 40 — SIGNIFICANT CHANGE UP
HOROWITZ INDEX BLDA+IHG-RTO: 60 — SIGNIFICANT CHANGE UP
HYALINE CASTS: 5 /LPF
HYPOCHROMIA BLD QL: SIGNIFICANT CHANGE UP
HYPOCHROMIA BLD QL: SLIGHT — SIGNIFICANT CHANGE UP
IMM GRANULOCYTES NFR BLD AUTO: 0.2 %
IMM GRANULOCYTES NFR BLD AUTO: 0.3 % — SIGNIFICANT CHANGE UP (ref 0–0.9)
IMM GRANULOCYTES NFR BLD AUTO: 0.4 %
IMM GRANULOCYTES NFR BLD AUTO: 0.7 % — SIGNIFICANT CHANGE UP (ref 0–0.9)
IMM GRANULOCYTES NFR BLD AUTO: 0.9 %
INR BLD: 1.07 — SIGNIFICANT CHANGE UP (ref 0.88–1.16)
INR BLD: 1.07 — SIGNIFICANT CHANGE UP (ref 0.88–1.16)
INR BLD: 1.1 — SIGNIFICANT CHANGE UP (ref 0.88–1.16)
INR BLD: 1.17 — HIGH (ref 0.88–1.16)
INR BLD: 1.19 — HIGH (ref 0.85–1.18)
INR BLD: 1.26 — HIGH (ref 0.85–1.18)
INR BLD: 1.27 — HIGH (ref 0.85–1.18)
INR BLD: 1.36 — HIGH (ref 0.88–1.16)
INR BLD: 1.51 — HIGH (ref 0.88–1.16)
INR BLD: 1.72 — HIGH (ref 0.85–1.18)
INR BLD: 1.85 — HIGH (ref 0.85–1.18)
INR BLD: 1.99 — HIGH (ref 0.85–1.18)
INR PPP: 1.14 RATIO
IRON SATN MFR SERPL: 110 UG/DL — SIGNIFICANT CHANGE UP (ref 45–165)
IRON SATN MFR SERPL: 13 UG/DL — LOW (ref 45–165)
IRON SATN MFR SERPL: 18 UG/DL — LOW (ref 45–165)
IRON SATN MFR SERPL: 26 %
IRON SATN MFR SERPL: 33 % — SIGNIFICANT CHANGE UP (ref 16–55)
IRON SATN MFR SERPL: 6 % — LOW (ref 16–55)
IRON SATN MFR SERPL: 9 % — LOW (ref 16–55)
IRON SERPL-MCNC: 83 UG/DL
K OXYTOCA DNA BLD POS QL NAA+NON-PROBE: SIGNIFICANT CHANGE UP
KETONES UR-MCNC: 15 MG/DL
KETONES UR-MCNC: ABNORMAL MG/DL
KETONES UR-MCNC: ABNORMAL MG/DL
KETONES UR-MCNC: NEGATIVE — SIGNIFICANT CHANGE UP
KETONES URINE: NEGATIVE
KLEBSIELLA SP DNA BLD POS QL NAA+NON-PRB: SIGNIFICANT CHANGE UP
L MONOCYTOG DNA BLD POS QL NAA+NON-PROBE: SIGNIFICANT CHANGE UP
LACOSAMIDE (VIMPAT) RESULT: 2.53 UG/ML — SIGNIFICANT CHANGE UP (ref 1–10)
LACOSAMIDE (VIMPAT) RESULT: 3.52 UG/ML — SIGNIFICANT CHANGE UP (ref 1–10)
LACTATE SERPL-SCNC: 0.9 MMOL/L — SIGNIFICANT CHANGE UP (ref 0.5–2)
LACTATE SERPL-SCNC: 1.1 MMOL/L — SIGNIFICANT CHANGE UP (ref 0.5–2)
LACTATE SERPL-SCNC: 1.2 MMOL/L — SIGNIFICANT CHANGE UP (ref 0.5–2)
LACTATE SERPL-SCNC: 1.6 MMOL/L — SIGNIFICANT CHANGE UP (ref 0.5–2)
LACTATE SERPL-SCNC: 1.8 MMOL/L — SIGNIFICANT CHANGE UP (ref 0.5–2)
LACTATE SERPL-SCNC: 1.9 MMOL/L — SIGNIFICANT CHANGE UP (ref 0.5–2)
LACTATE SERPL-SCNC: 12 MMOL/L — CRITICAL HIGH (ref 0.5–2)
LACTATE SERPL-SCNC: 2.3 MMOL/L — HIGH (ref 0.5–2)
LACTATE SERPL-SCNC: 2.4 MMOL/L — HIGH (ref 0.5–2)
LACTATE SERPL-SCNC: 2.4 MMOL/L — HIGH (ref 0.5–2)
LACTATE SERPL-SCNC: 2.5 MMOL/L — HIGH (ref 0.5–2)
LACTATE SERPL-SCNC: 2.6 MMOL/L — HIGH (ref 0.5–2)
LACTATE SERPL-SCNC: 2.7 MMOL/L — HIGH (ref 0.5–2)
LACTATE SERPL-SCNC: 3.1 MMOL/L — HIGH (ref 0.5–2)
LACTATE SERPL-SCNC: 3.5 MMOL/L — HIGH (ref 0.5–2)
LACTATE SERPL-SCNC: 3.6 MMOL/L — HIGH (ref 0.5–2)
LACTATE SERPL-SCNC: 3.8 MMOL/L — HIGH (ref 0.5–2)
LACTATE SERPL-SCNC: 4 MMOL/L — CRITICAL HIGH (ref 0.5–2)
LACTATE SERPL-SCNC: 4.4 MMOL/L — CRITICAL HIGH (ref 0.5–2)
LACTATE SERPL-SCNC: 5.9 MMOL/L — CRITICAL HIGH (ref 0.5–2)
LACTATE SERPL-SCNC: 7.9 MMOL/L — CRITICAL HIGH (ref 0.5–2)
LACTATE SERPL-SCNC: 8.5 MMOL/L — CRITICAL HIGH (ref 0.5–2)
LDH SERPL L TO P-CCNC: 137 U/L — SIGNIFICANT CHANGE UP (ref 50–242)
LDH SERPL L TO P-CCNC: 193 U/L — SIGNIFICANT CHANGE UP (ref 50–242)
LDH SERPL L TO P-CCNC: 222 U/L — SIGNIFICANT CHANGE UP (ref 50–242)
LDH SERPL L TO P-CCNC: 223 U/L — SIGNIFICANT CHANGE UP (ref 50–242)
LDH SERPL L TO P-CCNC: 230 U/L — SIGNIFICANT CHANGE UP (ref 50–242)
LDH SERPL L TO P-CCNC: 316 U/L — HIGH (ref 50–242)
LDLC SERPL CALC-MCNC: 50 MG/DL
LDLC SERPL CALC-MCNC: 54 MG/DL
LEUKOCYTE ESTERASE UR-ACNC: ABNORMAL
LEUKOCYTE ESTERASE UR-ACNC: NEGATIVE — SIGNIFICANT CHANGE UP
LEUKOCYTE ESTERASE URINE: NEGATIVE
LIDOCAIN IGE QN: 20 U/L — SIGNIFICANT CHANGE UP (ref 7–60)
LIDOCAIN IGE QN: 7 U/L — SIGNIFICANT CHANGE UP (ref 7–60)
LIDOCAIN IGE QN: 8 U/L — SIGNIFICANT CHANGE UP (ref 7–60)
LIPID PNL WITH DIRECT LDL SERPL: 36 MG/DL — SIGNIFICANT CHANGE UP
LMWH PPP CHRO-ACNC: 0.23 IU/ML — LOW (ref 0.5–1.1)
LMWH PPP CHRO-ACNC: 0.43 IU/ML — LOW (ref 0.5–1.1)
LMWH PPP CHRO-ACNC: 0.48 IU/ML — LOW (ref 0.5–1.1)
LMWH PPP CHRO-ACNC: 0.56 IU/ML — SIGNIFICANT CHANGE UP (ref 0.5–1.1)
LYMPHOCYTES # BLD AUTO: 0 % — LOW (ref 13–44)
LYMPHOCYTES # BLD AUTO: 0 K/UL — LOW (ref 1–3.3)
LYMPHOCYTES # BLD AUTO: 0.04 K/UL — LOW (ref 1–3.3)
LYMPHOCYTES # BLD AUTO: 0.1 K/UL — LOW (ref 1–3.3)
LYMPHOCYTES # BLD AUTO: 0.17 K/UL — LOW (ref 1–3.3)
LYMPHOCYTES # BLD AUTO: 0.2 K/UL — LOW (ref 1–3.3)
LYMPHOCYTES # BLD AUTO: 0.23 K/UL — LOW (ref 1–3.3)
LYMPHOCYTES # BLD AUTO: 0.24 K/UL — LOW (ref 1–3.3)
LYMPHOCYTES # BLD AUTO: 0.25 K/UL — LOW (ref 1–3.3)
LYMPHOCYTES # BLD AUTO: 0.39 K/UL — LOW (ref 1–3.3)
LYMPHOCYTES # BLD AUTO: 0.4 K/UL — LOW (ref 1–3.3)
LYMPHOCYTES # BLD AUTO: 0.7 K/UL — LOW (ref 1–3.3)
LYMPHOCYTES # BLD AUTO: 1 K/UL — SIGNIFICANT CHANGE UP (ref 1–3.3)
LYMPHOCYTES # BLD AUTO: 1.03 K/UL
LYMPHOCYTES # BLD AUTO: 1.08 K/UL
LYMPHOCYTES # BLD AUTO: 1.15 K/UL — SIGNIFICANT CHANGE UP (ref 1–3.3)
LYMPHOCYTES # BLD AUTO: 1.18 K/UL
LYMPHOCYTES # BLD AUTO: 1.28 K/UL
LYMPHOCYTES # BLD AUTO: 1.34 K/UL
LYMPHOCYTES # BLD AUTO: 1.49 K/UL — SIGNIFICANT CHANGE UP (ref 1–3.3)
LYMPHOCYTES # BLD AUTO: 1.7 % — LOW (ref 13–44)
LYMPHOCYTES # BLD AUTO: 1.8 % — LOW (ref 13–44)
LYMPHOCYTES # BLD AUTO: 10.7 % — LOW (ref 13–44)
LYMPHOCYTES # BLD AUTO: 12.5 % — LOW (ref 13–44)
LYMPHOCYTES # BLD AUTO: 13.2 % — SIGNIFICANT CHANGE UP (ref 13–44)
LYMPHOCYTES # BLD AUTO: 2.53 K/UL
LYMPHOCYTES # BLD AUTO: 2.6 % — LOW (ref 13–44)
LYMPHOCYTES # BLD AUTO: 35 % — SIGNIFICANT CHANGE UP (ref 13–44)
LYMPHOCYTES # BLD AUTO: 4.5 % — LOW (ref 13–44)
LYMPHOCYTES # BLD AUTO: 6 % — LOW (ref 13–44)
LYMPHOCYTES # BLD AUTO: 6.2 % — LOW (ref 13–44)
LYMPHOCYTES # BLD AUTO: 6.9 % — LOW (ref 13–44)
LYMPHOCYTES # BLD AUTO: 7 % — LOW (ref 13–44)
LYMPHOCYTES # BLD AUTO: 9.2 % — LOW (ref 13–44)
LYMPHOCYTES NFR BLD AUTO: 21.6 %
LYMPHOCYTES NFR BLD AUTO: 22.2 %
LYMPHOCYTES NFR BLD AUTO: 22.3 %
LYMPHOCYTES NFR BLD AUTO: 22.7 %
LYMPHOCYTES NFR BLD AUTO: 22.8 %
LYMPHOCYTES NFR BLD AUTO: 29.2 %
MACROCYTES BLD QL: SIGNIFICANT CHANGE UP
MACROCYTES BLD QL: SLIGHT — SIGNIFICANT CHANGE UP
MAGNESIUM SERPL-MCNC: 1.4 MG/DL — LOW (ref 1.6–2.6)
MAGNESIUM SERPL-MCNC: 1.4 MG/DL — LOW (ref 1.6–2.6)
MAGNESIUM SERPL-MCNC: 1.5 MG/DL — LOW (ref 1.6–2.6)
MAGNESIUM SERPL-MCNC: 1.6 MG/DL — SIGNIFICANT CHANGE UP (ref 1.6–2.6)
MAGNESIUM SERPL-MCNC: 1.7 MG/DL — SIGNIFICANT CHANGE UP (ref 1.6–2.6)
MAGNESIUM SERPL-MCNC: 1.8 MG/DL
MAGNESIUM SERPL-MCNC: 1.8 MG/DL — SIGNIFICANT CHANGE UP (ref 1.6–2.6)
MAGNESIUM SERPL-MCNC: 1.9 MG/DL — SIGNIFICANT CHANGE UP (ref 1.6–2.6)
MAGNESIUM SERPL-MCNC: 2 MG/DL — SIGNIFICANT CHANGE UP (ref 1.6–2.6)
MAGNESIUM SERPL-MCNC: 2.1 MG/DL — SIGNIFICANT CHANGE UP (ref 1.6–2.6)
MAGNESIUM SERPL-MCNC: 2.1 MG/DL — SIGNIFICANT CHANGE UP (ref 1.6–2.6)
MAGNESIUM SERPL-MCNC: 2.3 MG/DL — SIGNIFICANT CHANGE UP (ref 1.6–2.6)
MAN DIFF?: NORMAL
MANUAL SMEAR VERIFICATION: SIGNIFICANT CHANGE UP
MCHC RBC-ENTMCNC: 29.9 PG
MCHC RBC-ENTMCNC: 30.2 GM/DL
MCHC RBC-ENTMCNC: 30.9 PG — SIGNIFICANT CHANGE UP (ref 27–34)
MCHC RBC-ENTMCNC: 31 PG — SIGNIFICANT CHANGE UP (ref 27–34)
MCHC RBC-ENTMCNC: 31.1 GM/DL
MCHC RBC-ENTMCNC: 31.1 PG — SIGNIFICANT CHANGE UP (ref 27–34)
MCHC RBC-ENTMCNC: 31.2 PG — SIGNIFICANT CHANGE UP (ref 27–34)
MCHC RBC-ENTMCNC: 31.2 PG — SIGNIFICANT CHANGE UP (ref 27–34)
MCHC RBC-ENTMCNC: 31.3 GM/DL
MCHC RBC-ENTMCNC: 31.3 PG — SIGNIFICANT CHANGE UP (ref 27–34)
MCHC RBC-ENTMCNC: 31.4 PG — SIGNIFICANT CHANGE UP (ref 27–34)
MCHC RBC-ENTMCNC: 31.5 PG — SIGNIFICANT CHANGE UP (ref 27–34)
MCHC RBC-ENTMCNC: 31.5 PG — SIGNIFICANT CHANGE UP (ref 27–34)
MCHC RBC-ENTMCNC: 31.6 PG — SIGNIFICANT CHANGE UP (ref 27–34)
MCHC RBC-ENTMCNC: 31.7 PG — SIGNIFICANT CHANGE UP (ref 27–34)
MCHC RBC-ENTMCNC: 31.8 PG — SIGNIFICANT CHANGE UP (ref 27–34)
MCHC RBC-ENTMCNC: 31.9 GM/DL — LOW (ref 32–36)
MCHC RBC-ENTMCNC: 31.9 PG — SIGNIFICANT CHANGE UP (ref 27–34)
MCHC RBC-ENTMCNC: 31.9 PG — SIGNIFICANT CHANGE UP (ref 27–34)
MCHC RBC-ENTMCNC: 32 GM/DL — SIGNIFICANT CHANGE UP (ref 32–36)
MCHC RBC-ENTMCNC: 32 GM/DL — SIGNIFICANT CHANGE UP (ref 32–36)
MCHC RBC-ENTMCNC: 32 PG — SIGNIFICANT CHANGE UP (ref 27–34)
MCHC RBC-ENTMCNC: 32 PG — SIGNIFICANT CHANGE UP (ref 27–34)
MCHC RBC-ENTMCNC: 32.1 GM/DL — SIGNIFICANT CHANGE UP (ref 32–36)
MCHC RBC-ENTMCNC: 32.2 PG — SIGNIFICANT CHANGE UP (ref 27–34)
MCHC RBC-ENTMCNC: 32.3 GM/DL — SIGNIFICANT CHANGE UP (ref 32–36)
MCHC RBC-ENTMCNC: 32.3 GM/DL — SIGNIFICANT CHANGE UP (ref 32–36)
MCHC RBC-ENTMCNC: 32.4 GM/DL — SIGNIFICANT CHANGE UP (ref 32–36)
MCHC RBC-ENTMCNC: 32.4 GM/DL — SIGNIFICANT CHANGE UP (ref 32–36)
MCHC RBC-ENTMCNC: 32.4 PG — SIGNIFICANT CHANGE UP (ref 27–34)
MCHC RBC-ENTMCNC: 32.5 GM/DL — SIGNIFICANT CHANGE UP (ref 32–36)
MCHC RBC-ENTMCNC: 32.6 GM/DL — SIGNIFICANT CHANGE UP (ref 32–36)
MCHC RBC-ENTMCNC: 32.6 PG
MCHC RBC-ENTMCNC: 32.6 PG — SIGNIFICANT CHANGE UP (ref 27–34)
MCHC RBC-ENTMCNC: 32.7 GM/DL — SIGNIFICANT CHANGE UP (ref 32–36)
MCHC RBC-ENTMCNC: 32.7 PG
MCHC RBC-ENTMCNC: 32.7 PG — SIGNIFICANT CHANGE UP (ref 27–34)
MCHC RBC-ENTMCNC: 32.8 GM/DL — SIGNIFICANT CHANGE UP (ref 32–36)
MCHC RBC-ENTMCNC: 32.8 PG — SIGNIFICANT CHANGE UP (ref 27–34)
MCHC RBC-ENTMCNC: 32.9 GM/DL — SIGNIFICANT CHANGE UP (ref 32–36)
MCHC RBC-ENTMCNC: 32.9 PG — SIGNIFICANT CHANGE UP (ref 27–34)
MCHC RBC-ENTMCNC: 33 GM/DL — SIGNIFICANT CHANGE UP (ref 32–36)
MCHC RBC-ENTMCNC: 33 PG
MCHC RBC-ENTMCNC: 33 PG — SIGNIFICANT CHANGE UP (ref 27–34)
MCHC RBC-ENTMCNC: 33.1 GM/DL — SIGNIFICANT CHANGE UP (ref 32–36)
MCHC RBC-ENTMCNC: 33.1 GM/DL — SIGNIFICANT CHANGE UP (ref 32–36)
MCHC RBC-ENTMCNC: 33.1 PG — SIGNIFICANT CHANGE UP (ref 27–34)
MCHC RBC-ENTMCNC: 33.2 GM/DL — SIGNIFICANT CHANGE UP (ref 32–36)
MCHC RBC-ENTMCNC: 33.2 PG — SIGNIFICANT CHANGE UP (ref 27–34)
MCHC RBC-ENTMCNC: 33.3 GM/DL — SIGNIFICANT CHANGE UP (ref 32–36)
MCHC RBC-ENTMCNC: 33.3 PG — SIGNIFICANT CHANGE UP (ref 27–34)
MCHC RBC-ENTMCNC: 33.4 GM/DL
MCHC RBC-ENTMCNC: 33.4 GM/DL
MCHC RBC-ENTMCNC: 33.4 PG — SIGNIFICANT CHANGE UP (ref 27–34)
MCHC RBC-ENTMCNC: 33.5 GM/DL — SIGNIFICANT CHANGE UP (ref 32–36)
MCHC RBC-ENTMCNC: 33.5 PG — SIGNIFICANT CHANGE UP (ref 27–34)
MCHC RBC-ENTMCNC: 33.5 PG — SIGNIFICANT CHANGE UP (ref 27–34)
MCHC RBC-ENTMCNC: 33.6 GM/DL — SIGNIFICANT CHANGE UP (ref 32–36)
MCHC RBC-ENTMCNC: 33.6 PG
MCHC RBC-ENTMCNC: 33.6 PG — SIGNIFICANT CHANGE UP (ref 27–34)
MCHC RBC-ENTMCNC: 33.6 PG — SIGNIFICANT CHANGE UP (ref 27–34)
MCHC RBC-ENTMCNC: 33.7 GM/DL — SIGNIFICANT CHANGE UP (ref 32–36)
MCHC RBC-ENTMCNC: 33.7 PG — SIGNIFICANT CHANGE UP (ref 27–34)
MCHC RBC-ENTMCNC: 33.8 GM/DL — SIGNIFICANT CHANGE UP (ref 32–36)
MCHC RBC-ENTMCNC: 33.8 GM/DL — SIGNIFICANT CHANGE UP (ref 32–36)
MCHC RBC-ENTMCNC: 33.8 PG — SIGNIFICANT CHANGE UP (ref 27–34)
MCHC RBC-ENTMCNC: 33.9 GM/DL — SIGNIFICANT CHANGE UP (ref 32–36)
MCHC RBC-ENTMCNC: 33.9 GM/DL — SIGNIFICANT CHANGE UP (ref 32–36)
MCHC RBC-ENTMCNC: 34 GM/DL — SIGNIFICANT CHANGE UP (ref 32–36)
MCHC RBC-ENTMCNC: 34 PG
MCHC RBC-ENTMCNC: 34.1 GM/DL — SIGNIFICANT CHANGE UP (ref 32–36)
MCHC RBC-ENTMCNC: 34.1 GM/DL — SIGNIFICANT CHANGE UP (ref 32–36)
MCHC RBC-ENTMCNC: 34.1 PG — HIGH (ref 27–34)
MCHC RBC-ENTMCNC: 34.2 GM/DL — SIGNIFICANT CHANGE UP (ref 32–36)
MCHC RBC-ENTMCNC: 34.3 GM/DL — SIGNIFICANT CHANGE UP (ref 32–36)
MCHC RBC-ENTMCNC: 34.6 GM/DL — SIGNIFICANT CHANGE UP (ref 32–36)
MCHC RBC-ENTMCNC: 34.7 GM/DL — SIGNIFICANT CHANGE UP (ref 32–36)
MCHC RBC-ENTMCNC: 34.8 GM/DL — SIGNIFICANT CHANGE UP (ref 32–36)
MCHC RBC-ENTMCNC: 34.8 GM/DL — SIGNIFICANT CHANGE UP (ref 32–36)
MCHC RBC-ENTMCNC: 34.9 GM/DL
MCHC RBC-ENTMCNC: 34.9 GM/DL — SIGNIFICANT CHANGE UP (ref 32–36)
MCHC RBC-ENTMCNC: 34.9 GM/DL — SIGNIFICANT CHANGE UP (ref 32–36)
MCHC RBC-ENTMCNC: 35.1 GM/DL — SIGNIFICANT CHANGE UP (ref 32–36)
MCHC RBC-ENTMCNC: 35.1 GM/DL — SIGNIFICANT CHANGE UP (ref 32–36)
MCHC RBC-ENTMCNC: 35.3 GM/DL — SIGNIFICANT CHANGE UP (ref 32–36)
MCHC RBC-ENTMCNC: 35.3 GM/DL — SIGNIFICANT CHANGE UP (ref 32–36)
MCHC RBC-ENTMCNC: 35.5 GM/DL — SIGNIFICANT CHANGE UP (ref 32–36)
MCHC RBC-ENTMCNC: 35.6 GM/DL — SIGNIFICANT CHANGE UP (ref 32–36)
MCHC RBC-ENTMCNC: 35.6 GM/DL — SIGNIFICANT CHANGE UP (ref 32–36)
MCV RBC AUTO: 100 FL — SIGNIFICANT CHANGE UP (ref 80–100)
MCV RBC AUTO: 100 FL — SIGNIFICANT CHANGE UP (ref 80–100)
MCV RBC AUTO: 100.3 FL — HIGH (ref 80–100)
MCV RBC AUTO: 100.4 FL — HIGH (ref 80–100)
MCV RBC AUTO: 100.4 FL — HIGH (ref 80–100)
MCV RBC AUTO: 100.6 FL — HIGH (ref 80–100)
MCV RBC AUTO: 100.6 FL — HIGH (ref 80–100)
MCV RBC AUTO: 100.7 FL — HIGH (ref 80–100)
MCV RBC AUTO: 100.8 FL — HIGH (ref 80–100)
MCV RBC AUTO: 100.9 FL — HIGH (ref 80–100)
MCV RBC AUTO: 101.2 FL — HIGH (ref 80–100)
MCV RBC AUTO: 101.6 FL — HIGH (ref 80–100)
MCV RBC AUTO: 101.8 FL — HIGH (ref 80–100)
MCV RBC AUTO: 102.4 FL — HIGH (ref 80–100)
MCV RBC AUTO: 103.4 FL — HIGH (ref 80–100)
MCV RBC AUTO: 104 FL — HIGH (ref 80–100)
MCV RBC AUTO: 104.1 FL — HIGH (ref 80–100)
MCV RBC AUTO: 105 FL — HIGH (ref 80–100)
MCV RBC AUTO: 105.3 FL
MCV RBC AUTO: 108.1 FL
MCV RBC AUTO: 90.7 FL — SIGNIFICANT CHANGE UP (ref 80–100)
MCV RBC AUTO: 91.5 FL — SIGNIFICANT CHANGE UP (ref 80–100)
MCV RBC AUTO: 91.7 FL — SIGNIFICANT CHANGE UP (ref 80–100)
MCV RBC AUTO: 92 FL — SIGNIFICANT CHANGE UP (ref 80–100)
MCV RBC AUTO: 92 FL — SIGNIFICANT CHANGE UP (ref 80–100)
MCV RBC AUTO: 92.2 FL — SIGNIFICANT CHANGE UP (ref 80–100)
MCV RBC AUTO: 92.4 FL — SIGNIFICANT CHANGE UP (ref 80–100)
MCV RBC AUTO: 92.6 FL — SIGNIFICANT CHANGE UP (ref 80–100)
MCV RBC AUTO: 93 FL — SIGNIFICANT CHANGE UP (ref 80–100)
MCV RBC AUTO: 93 FL — SIGNIFICANT CHANGE UP (ref 80–100)
MCV RBC AUTO: 93.1 FL — SIGNIFICANT CHANGE UP (ref 80–100)
MCV RBC AUTO: 93.2 FL — SIGNIFICANT CHANGE UP (ref 80–100)
MCV RBC AUTO: 93.2 FL — SIGNIFICANT CHANGE UP (ref 80–100)
MCV RBC AUTO: 93.3 FL — SIGNIFICANT CHANGE UP (ref 80–100)
MCV RBC AUTO: 93.5 FL — SIGNIFICANT CHANGE UP (ref 80–100)
MCV RBC AUTO: 93.8 FL — SIGNIFICANT CHANGE UP (ref 80–100)
MCV RBC AUTO: 93.9 FL — SIGNIFICANT CHANGE UP (ref 80–100)
MCV RBC AUTO: 94 FL — SIGNIFICANT CHANGE UP (ref 80–100)
MCV RBC AUTO: 94.1 FL — SIGNIFICANT CHANGE UP (ref 80–100)
MCV RBC AUTO: 94.1 FL — SIGNIFICANT CHANGE UP (ref 80–100)
MCV RBC AUTO: 94.7 FL — SIGNIFICANT CHANGE UP (ref 80–100)
MCV RBC AUTO: 94.8 FL — SIGNIFICANT CHANGE UP (ref 80–100)
MCV RBC AUTO: 94.8 FL — SIGNIFICANT CHANGE UP (ref 80–100)
MCV RBC AUTO: 94.9 FL — SIGNIFICANT CHANGE UP (ref 80–100)
MCV RBC AUTO: 95.2 FL — SIGNIFICANT CHANGE UP (ref 80–100)
MCV RBC AUTO: 95.3 FL — SIGNIFICANT CHANGE UP (ref 80–100)
MCV RBC AUTO: 95.7 FL — SIGNIFICANT CHANGE UP (ref 80–100)
MCV RBC AUTO: 95.7 FL — SIGNIFICANT CHANGE UP (ref 80–100)
MCV RBC AUTO: 95.9 FL — SIGNIFICANT CHANGE UP (ref 80–100)
MCV RBC AUTO: 96.5 FL — SIGNIFICANT CHANGE UP (ref 80–100)
MCV RBC AUTO: 96.6 FL — SIGNIFICANT CHANGE UP (ref 80–100)
MCV RBC AUTO: 96.8 FL — SIGNIFICANT CHANGE UP (ref 80–100)
MCV RBC AUTO: 96.9 FL — SIGNIFICANT CHANGE UP (ref 80–100)
MCV RBC AUTO: 96.9 FL — SIGNIFICANT CHANGE UP (ref 80–100)
MCV RBC AUTO: 97 FL — SIGNIFICANT CHANGE UP (ref 80–100)
MCV RBC AUTO: 97.1 FL — SIGNIFICANT CHANGE UP (ref 80–100)
MCV RBC AUTO: 97.3 FL
MCV RBC AUTO: 97.3 FL — SIGNIFICANT CHANGE UP (ref 80–100)
MCV RBC AUTO: 97.5 FL — SIGNIFICANT CHANGE UP (ref 80–100)
MCV RBC AUTO: 97.6 FL
MCV RBC AUTO: 97.6 FL — SIGNIFICANT CHANGE UP (ref 80–100)
MCV RBC AUTO: 97.8 FL — SIGNIFICANT CHANGE UP (ref 80–100)
MCV RBC AUTO: 97.9 FL
MCV RBC AUTO: 98.2 FL — SIGNIFICANT CHANGE UP (ref 80–100)
MCV RBC AUTO: 98.4 FL — SIGNIFICANT CHANGE UP (ref 80–100)
MCV RBC AUTO: 98.5 FL — SIGNIFICANT CHANGE UP (ref 80–100)
MCV RBC AUTO: 98.7 FL — SIGNIFICANT CHANGE UP (ref 80–100)
MCV RBC AUTO: 98.7 FL — SIGNIFICANT CHANGE UP (ref 80–100)
MCV RBC AUTO: 98.8 FL — SIGNIFICANT CHANGE UP (ref 80–100)
MCV RBC AUTO: 98.9 FL — SIGNIFICANT CHANGE UP (ref 80–100)
MCV RBC AUTO: 98.9 FL — SIGNIFICANT CHANGE UP (ref 80–100)
MCV RBC AUTO: 99 FL — SIGNIFICANT CHANGE UP (ref 80–100)
MCV RBC AUTO: 99.2 FL
MCV RBC AUTO: 99.2 FL — SIGNIFICANT CHANGE UP (ref 80–100)
METAMYELOCYTES # FLD: 0.9 % — HIGH (ref 0–0)
METAMYELOCYTES # FLD: 2.6 % — HIGH (ref 0–0)
METAMYELOCYTES # FLD: 2.7 % — HIGH (ref 0–0)
METAMYELOCYTES # FLD: 6.1 % — HIGH (ref 0–0)
METHADONE UR-MCNC: NEGATIVE — SIGNIFICANT CHANGE UP
METHADONE UR-MCNC: NEGATIVE — SIGNIFICANT CHANGE UP
METHGB MFR BLDA: 0.5 % — SIGNIFICANT CHANGE UP
METHGB MFR BLDV: 0.7 % — SIGNIFICANT CHANGE UP
METHOD TYPE: SIGNIFICANT CHANGE UP
MICROCYTES BLD QL: SIGNIFICANT CHANGE UP
MICROCYTES BLD QL: SLIGHT — SIGNIFICANT CHANGE UP
MICROSCOPIC-UA: NORMAL
MONOCYTES # BLD AUTO: 0 K/UL — SIGNIFICANT CHANGE UP (ref 0–0.9)
MONOCYTES # BLD AUTO: 0.02 K/UL — SIGNIFICANT CHANGE UP (ref 0–0.9)
MONOCYTES # BLD AUTO: 0.1 K/UL — SIGNIFICANT CHANGE UP (ref 0–0.9)
MONOCYTES # BLD AUTO: 0.11 K/UL — SIGNIFICANT CHANGE UP (ref 0–0.9)
MONOCYTES # BLD AUTO: 0.11 K/UL — SIGNIFICANT CHANGE UP (ref 0–0.9)
MONOCYTES # BLD AUTO: 0.14 K/UL — SIGNIFICANT CHANGE UP (ref 0–0.9)
MONOCYTES # BLD AUTO: 0.18 K/UL — SIGNIFICANT CHANGE UP (ref 0–0.9)
MONOCYTES # BLD AUTO: 0.19 K/UL — SIGNIFICANT CHANGE UP (ref 0–0.9)
MONOCYTES # BLD AUTO: 0.3 K/UL — SIGNIFICANT CHANGE UP (ref 0–0.9)
MONOCYTES # BLD AUTO: 0.32 K/UL — SIGNIFICANT CHANGE UP (ref 0–0.9)
MONOCYTES # BLD AUTO: 0.34 K/UL
MONOCYTES # BLD AUTO: 0.37 K/UL
MONOCYTES # BLD AUTO: 0.37 K/UL — SIGNIFICANT CHANGE UP (ref 0–0.9)
MONOCYTES # BLD AUTO: 0.41 K/UL
MONOCYTES # BLD AUTO: 0.44 K/UL — SIGNIFICANT CHANGE UP (ref 0–0.9)
MONOCYTES # BLD AUTO: 0.46 K/UL
MONOCYTES # BLD AUTO: 0.48 K/UL
MONOCYTES # BLD AUTO: 0.51 K/UL — SIGNIFICANT CHANGE UP (ref 0–0.9)
MONOCYTES # BLD AUTO: 0.94 K/UL — HIGH (ref 0–0.9)
MONOCYTES # BLD AUTO: 0.99 K/UL
MONOCYTES NFR BLD AUTO: 0 % — LOW (ref 2–14)
MONOCYTES NFR BLD AUTO: 0.9 % — LOW (ref 2–14)
MONOCYTES NFR BLD AUTO: 13.3 % — SIGNIFICANT CHANGE UP (ref 2–14)
MONOCYTES NFR BLD AUTO: 2.7 % — SIGNIFICANT CHANGE UP (ref 2–14)
MONOCYTES NFR BLD AUTO: 3.4 % — SIGNIFICANT CHANGE UP (ref 2–14)
MONOCYTES NFR BLD AUTO: 3.5 % — SIGNIFICANT CHANGE UP (ref 2–14)
MONOCYTES NFR BLD AUTO: 3.6 % — SIGNIFICANT CHANGE UP (ref 2–14)
MONOCYTES NFR BLD AUTO: 4.4 % — SIGNIFICANT CHANGE UP (ref 2–14)
MONOCYTES NFR BLD AUTO: 4.5 % — SIGNIFICANT CHANGE UP (ref 2–14)
MONOCYTES NFR BLD AUTO: 5.2 % — SIGNIFICANT CHANGE UP (ref 2–14)
MONOCYTES NFR BLD AUTO: 5.8 % — SIGNIFICANT CHANGE UP (ref 2–14)
MONOCYTES NFR BLD AUTO: 6.8 % — SIGNIFICANT CHANGE UP (ref 2–14)
MONOCYTES NFR BLD AUTO: 7.1 %
MONOCYTES NFR BLD AUTO: 7.8 %
MONOCYTES NFR BLD AUTO: 7.8 %
MONOCYTES NFR BLD AUTO: 8 % — SIGNIFICANT CHANGE UP (ref 2–14)
MONOCYTES NFR BLD AUTO: 8.7 %
MONOCYTES NFR BLD AUTO: 9 %
MONOCYTES NFR BLD AUTO: 9 % — SIGNIFICANT CHANGE UP (ref 2–14)
MONOCYTES NFR BLD AUTO: 9.3 %
MRSA PCR RESULT.: NEGATIVE — SIGNIFICANT CHANGE UP
MRSA PCR RESULT.: SIGNIFICANT CHANGE UP
MRSA SPEC QL CULT: SIGNIFICANT CHANGE UP
MSSA DNA SPEC QL NAA+PROBE: SIGNIFICANT CHANGE UP
MYELOCYTES NFR BLD: 0.9 % — HIGH (ref 0–0)
MYELOCYTES NFR BLD: 1.7 % — HIGH (ref 0–0)
MYELOCYTES NFR BLD: 8.9 % — HIGH (ref 0–0)
MYOGLOBIN UR-MCNC: 4 NG/ML — SIGNIFICANT CHANGE UP (ref 0–13)
N MEN ISLT CULT: SIGNIFICANT CHANGE UP
NEUTROPHILS # BLD AUTO: 1.48 K/UL — LOW (ref 1.8–7.4)
NEUTROPHILS # BLD AUTO: 1.84 K/UL — SIGNIFICANT CHANGE UP (ref 1.8–7.4)
NEUTROPHILS # BLD AUTO: 13.53 K/UL — HIGH (ref 1.8–7.4)
NEUTROPHILS # BLD AUTO: 2.16 K/UL — SIGNIFICANT CHANGE UP (ref 1.8–7.4)
NEUTROPHILS # BLD AUTO: 2.26 K/UL
NEUTROPHILS # BLD AUTO: 2.28 K/UL — SIGNIFICANT CHANGE UP (ref 1.8–7.4)
NEUTROPHILS # BLD AUTO: 2.29 K/UL — SIGNIFICANT CHANGE UP (ref 1.8–7.4)
NEUTROPHILS # BLD AUTO: 2.68 K/UL
NEUTROPHILS # BLD AUTO: 2.73 K/UL
NEUTROPHILS # BLD AUTO: 2.92 K/UL — SIGNIFICANT CHANGE UP (ref 1.8–7.4)
NEUTROPHILS # BLD AUTO: 2.94 K/UL
NEUTROPHILS # BLD AUTO: 22.82 K/UL — HIGH (ref 1.8–7.4)
NEUTROPHILS # BLD AUTO: 3.04 K/UL
NEUTROPHILS # BLD AUTO: 3.13 K/UL — SIGNIFICANT CHANGE UP (ref 1.8–7.4)
NEUTROPHILS # BLD AUTO: 3.43 K/UL — SIGNIFICANT CHANGE UP (ref 1.8–7.4)
NEUTROPHILS # BLD AUTO: 4.66 K/UL — SIGNIFICANT CHANGE UP (ref 1.8–7.4)
NEUTROPHILS # BLD AUTO: 4.8 K/UL — SIGNIFICANT CHANGE UP (ref 1.8–7.4)
NEUTROPHILS # BLD AUTO: 5.84 K/UL — SIGNIFICANT CHANGE UP (ref 1.8–7.4)
NEUTROPHILS # BLD AUTO: 6.41 K/UL
NEUTROPHILS # BLD AUTO: 8.41 K/UL — HIGH (ref 1.8–7.4)
NEUTROPHILS NFR BLD AUTO: 45 % — SIGNIFICANT CHANGE UP (ref 43–77)
NEUTROPHILS NFR BLD AUTO: 49.9 %
NEUTROPHILS NFR BLD AUTO: 51.5 %
NEUTROPHILS NFR BLD AUTO: 56.4 %
NEUTROPHILS NFR BLD AUTO: 56.5 %
NEUTROPHILS NFR BLD AUTO: 57.3 %
NEUTROPHILS NFR BLD AUTO: 57.3 %
NEUTROPHILS NFR BLD AUTO: 65.5 % — SIGNIFICANT CHANGE UP (ref 43–77)
NEUTROPHILS NFR BLD AUTO: 71 % — SIGNIFICANT CHANGE UP (ref 43–77)
NEUTROPHILS NFR BLD AUTO: 71.4 % — SIGNIFICANT CHANGE UP (ref 43–77)
NEUTROPHILS NFR BLD AUTO: 77.3 % — HIGH (ref 43–77)
NEUTROPHILS NFR BLD AUTO: 78.3 % — HIGH (ref 43–77)
NEUTROPHILS NFR BLD AUTO: 82.1 % — HIGH (ref 43–77)
NEUTROPHILS NFR BLD AUTO: 82.5 % — HIGH (ref 43–77)
NEUTROPHILS NFR BLD AUTO: 84 % — HIGH (ref 43–77)
NEUTROPHILS NFR BLD AUTO: 84.1 % — HIGH (ref 43–77)
NEUTROPHILS NFR BLD AUTO: 85.7 % — HIGH (ref 43–77)
NEUTROPHILS NFR BLD AUTO: 89.7 % — HIGH (ref 43–77)
NEUTROPHILS NFR BLD AUTO: 95.6 % — HIGH (ref 43–77)
NEUTROPHILS NFR BLD AUTO: 96.5 % — HIGH (ref 43–77)
NEUTS BAND # BLD: 0.9 % — SIGNIFICANT CHANGE UP (ref 0–8)
NEUTS BAND # BLD: 1.8 % — SIGNIFICANT CHANGE UP (ref 0–8)
NEUTS BAND # BLD: 10.5 % — HIGH (ref 0–8)
NEUTS BAND # BLD: 11.5 % — HIGH (ref 0–8)
NEUTS BAND # BLD: 13.2 % — HIGH (ref 0–8)
NEUTS BAND # BLD: 2.6 % — SIGNIFICANT CHANGE UP (ref 0–8)
NEUTS BAND # BLD: 4.4 % — SIGNIFICANT CHANGE UP (ref 0–8)
NEUTS BAND # BLD: 7 % — SIGNIFICANT CHANGE UP (ref 0–8)
NEUTS BAND # BLD: 7.1 % — SIGNIFICANT CHANGE UP (ref 0–8)
NITRITE UR-MCNC: NEGATIVE — SIGNIFICANT CHANGE UP
NITRITE UR-MCNC: POSITIVE
NITRITE URINE: NEGATIVE
NON HDL CHOLESTEROL: 46 MG/DL — SIGNIFICANT CHANGE UP
NONHDLC SERPL-MCNC: 62 MG/DL
NONHDLC SERPL-MCNC: 68 MG/DL
NRBC # BLD: 0 /100 WBCS — SIGNIFICANT CHANGE UP (ref 0–0)
NRBC # BLD: 0 /100 — SIGNIFICANT CHANGE UP (ref 0–0)
NRBC # BLD: 1 /100 WBCS — HIGH (ref 0–0)
NRBC # BLD: 1 /100 WBCS — HIGH (ref 0–0)
NRBC # BLD: 1 /100 — HIGH (ref 0–0)
NRBC # BLD: 2 /100 WBCS — HIGH (ref 0–0)
NRBC # BLD: 2 /100 WBCS — HIGH (ref 0–0)
NRBC # BLD: 2 /100 — HIGH (ref 0–0)
NRBC # BLD: 3 /100 WBCS — HIGH (ref 0–0)
NRBC # BLD: 3 /100 WBCS — HIGH (ref 0–0)
NRBC # BLD: 5 /100 — HIGH (ref 0–0)
NRBC # BLD: SIGNIFICANT CHANGE UP /100 WBCS (ref 0–0)
NT-PROBNP SERPL-MCNC: 109 PG/ML
NT-PROBNP SERPL-SCNC: 1072 PG/ML — HIGH (ref 0–300)
NT-PROBNP SERPL-SCNC: 1137 PG/ML — HIGH (ref 0–300)
NT-PROBNP SERPL-SCNC: 1323 PG/ML — HIGH (ref 0–300)
NT-PROBNP SERPL-SCNC: 4001 PG/ML — HIGH (ref 0–300)
NT-PROBNP SERPL-SCNC: 5584 PG/ML — HIGH (ref 0–300)
NT-PROBNP SERPL-SCNC: 6027 PG/ML — HIGH (ref 0–300)
NT-PROBNP SERPL-SCNC: 6043 PG/ML — HIGH (ref 0–300)
NT-PROBNP SERPL-SCNC: 6373 PG/ML — HIGH (ref 0–300)
NT-PROBNP SERPL-SCNC: 6714 PG/ML — HIGH (ref 0–300)
OB PNL STL: POSITIVE
OPIATES UR-MCNC: NEGATIVE — SIGNIFICANT CHANGE UP
OPIATES UR-MCNC: NEGATIVE — SIGNIFICANT CHANGE UP
ORGANISM # SPEC MICROSCOPIC CNT: SIGNIFICANT CHANGE UP
OSMOLALITY SERPL: 240 MOSM/KG — LOW (ref 280–301)
OSMOLALITY SERPL: 257 MOSM/KG — LOW (ref 280–301)
OSMOLALITY SERPL: 259 MOSM/KG — LOW (ref 280–301)
OSMOLALITY SERPL: 267 MOSM/KG — LOW (ref 280–301)
OSMOLALITY SERPL: 272 MOSM/KG — LOW (ref 280–301)
OSMOLALITY SERPL: 275 MOSM/KG — LOW (ref 280–301)
OSMOLALITY SERPL: 280 MOSM/KG — SIGNIFICANT CHANGE UP (ref 280–301)
OSMOLALITY SERPL: 281 MOSM/KG — SIGNIFICANT CHANGE UP (ref 280–301)
OSMOLALITY SERPL: 283 MOSMOL/KG
OSMOLALITY UR: 141 MOSM/KG — LOW (ref 300–900)
OSMOLALITY UR: 273 MOSM/KG — LOW (ref 300–900)
OSMOLALITY UR: 358 MOSM/KG — SIGNIFICANT CHANGE UP (ref 300–900)
OSMOLALITY UR: 367 MOSM/KG — SIGNIFICANT CHANGE UP (ref 300–900)
OSMOLALITY UR: 508 MOSM/KG — SIGNIFICANT CHANGE UP (ref 300–900)
OSMOLALITY UR: 568 MOSM/KG — SIGNIFICANT CHANGE UP (ref 300–900)
OSMOLALITY UR: 585 MOSM/KG — SIGNIFICANT CHANGE UP (ref 300–900)
OSMOLALITY UR: 741 MOSM/KG — SIGNIFICANT CHANGE UP (ref 300–900)
OSMOLALITY UR: 802 MOSM/KG — SIGNIFICANT CHANGE UP (ref 300–900)
OSMOLALITY UR: 834 MOSM/KG — SIGNIFICANT CHANGE UP (ref 300–900)
OTHER CELLS CSF MANUAL: 11.3 ML/DL — LOW (ref 18–22)
OVALOCYTES BLD QL SMEAR: SLIGHT — SIGNIFICANT CHANGE UP
OXYHGB MFR BLDA: 98.1 % — HIGH (ref 90–95)
P AERUGINOSA DNA BLD POS NAA+NON-PROBE: SIGNIFICANT CHANGE UP
P AERUGINOSA DNA BLD POS NAA+NON-PROBE: SIGNIFICANT CHANGE UP
PCO2 BLDA: 19 MMHG — LOW (ref 35–48)
PCO2 BLDA: 22 MMHG — LOW (ref 35–48)
PCO2 BLDA: 23 MMHG — LOW (ref 35–48)
PCO2 BLDA: 23 MMHG — LOW (ref 35–48)
PCO2 BLDA: 24 MMHG — LOW (ref 35–48)
PCO2 BLDA: 25 MMHG — LOW (ref 35–48)
PCO2 BLDA: 27 MMHG — LOW (ref 35–48)
PCO2 BLDA: 28 MMHG — LOW (ref 35–48)
PCO2 BLDA: 28 MMHG — LOW (ref 35–48)
PCO2 BLDA: 29 MMHG — LOW (ref 35–48)
PCO2 BLDA: 31 MMHG — LOW (ref 35–48)
PCO2 BLDA: 31 MMHG — LOW (ref 35–48)
PCO2 BLDA: 32 MMHG — LOW (ref 35–48)
PCO2 BLDA: 33 MMHG — LOW (ref 35–48)
PCO2 BLDA: 35 MMHG — SIGNIFICANT CHANGE UP (ref 35–48)
PCO2 BLDA: 35 MMHG — SIGNIFICANT CHANGE UP (ref 35–48)
PCO2 BLDA: 36 MMHG — SIGNIFICANT CHANGE UP (ref 35–48)
PCO2 BLDA: 37 MMHG — SIGNIFICANT CHANGE UP (ref 35–48)
PCO2 BLDA: 38 MMHG — SIGNIFICANT CHANGE UP (ref 35–48)
PCO2 BLDA: 39 MMHG — SIGNIFICANT CHANGE UP (ref 35–48)
PCO2 BLDA: 40 MMHG — SIGNIFICANT CHANGE UP (ref 35–48)
PCO2 BLDA: 41 MMHG — SIGNIFICANT CHANGE UP (ref 35–48)
PCO2 BLDA: 46 MMHG — SIGNIFICANT CHANGE UP (ref 35–48)
PCO2 BLDA: 48 MMHG — SIGNIFICANT CHANGE UP (ref 35–48)
PCO2 BLDV: 41 MMHG — LOW (ref 42–55)
PCO2 BLDV: 42 MMHG — SIGNIFICANT CHANGE UP (ref 42–55)
PCO2 BLDV: 45 MMHG — SIGNIFICANT CHANGE UP (ref 42–55)
PCO2 BLDV: 46 MMHG — SIGNIFICANT CHANGE UP (ref 42–55)
PCO2 BLDV: 53 MMHG — SIGNIFICANT CHANGE UP (ref 42–55)
PCP SPEC-MCNC: SIGNIFICANT CHANGE UP
PCP SPEC-MCNC: SIGNIFICANT CHANGE UP
PCP UR-MCNC: NEGATIVE — SIGNIFICANT CHANGE UP
PCP UR-MCNC: NEGATIVE — SIGNIFICANT CHANGE UP
PH BLDA: 7.09 — CRITICAL LOW (ref 7.35–7.45)
PH BLDA: 7.2 — CRITICAL LOW (ref 7.35–7.45)
PH BLDA: 7.24 — LOW (ref 7.35–7.45)
PH BLDA: 7.24 — LOW (ref 7.35–7.45)
PH BLDA: 7.27 — LOW (ref 7.35–7.45)
PH BLDA: 7.28 — LOW (ref 7.35–7.45)
PH BLDA: 7.29 — LOW (ref 7.35–7.45)
PH BLDA: 7.3 — LOW (ref 7.35–7.45)
PH BLDA: 7.3 — LOW (ref 7.35–7.45)
PH BLDA: 7.31 — LOW (ref 7.35–7.45)
PH BLDA: 7.33 — LOW (ref 7.35–7.45)
PH BLDA: 7.38 — SIGNIFICANT CHANGE UP (ref 7.35–7.45)
PH BLDA: 7.41 — SIGNIFICANT CHANGE UP (ref 7.35–7.45)
PH BLDA: 7.41 — SIGNIFICANT CHANGE UP (ref 7.35–7.45)
PH BLDA: 7.42 — SIGNIFICANT CHANGE UP (ref 7.35–7.45)
PH BLDA: 7.44 — SIGNIFICANT CHANGE UP (ref 7.35–7.45)
PH BLDA: 7.45 — SIGNIFICANT CHANGE UP (ref 7.35–7.45)
PH BLDA: 7.45 — SIGNIFICANT CHANGE UP (ref 7.35–7.45)
PH BLDA: 7.47 — HIGH (ref 7.35–7.45)
PH BLDA: 7.48 — HIGH (ref 7.35–7.45)
PH BLDA: 7.49 — HIGH (ref 7.35–7.45)
PH BLDA: 7.5 — HIGH (ref 7.35–7.45)
PH BLDA: 7.51 — HIGH (ref 7.35–7.45)
PH BLDA: 7.52 — HIGH (ref 7.35–7.45)
PH BLDA: 7.54 — HIGH (ref 7.35–7.45)
PH BLDV: 7.18 — CRITICAL LOW (ref 7.32–7.43)
PH BLDV: 7.37 — SIGNIFICANT CHANGE UP (ref 7.32–7.43)
PH BLDV: 7.4 — SIGNIFICANT CHANGE UP (ref 7.32–7.43)
PH BLDV: 7.42 — SIGNIFICANT CHANGE UP (ref 7.32–7.43)
PH BLDV: 7.42 — SIGNIFICANT CHANGE UP (ref 7.32–7.43)
PH UR: 5 — SIGNIFICANT CHANGE UP (ref 5–8)
PH UR: 5.5 — SIGNIFICANT CHANGE UP (ref 5–8)
PH UR: 6 — SIGNIFICANT CHANGE UP (ref 5–8)
PH UR: 6 — SIGNIFICANT CHANGE UP (ref 5–8)
PH UR: 6.5 — SIGNIFICANT CHANGE UP (ref 5–8)
PH UR: 6.5 — SIGNIFICANT CHANGE UP (ref 5–8)
PH UR: 7 — SIGNIFICANT CHANGE UP (ref 5–8)
PH UR: 7 — SIGNIFICANT CHANGE UP (ref 5–8)
PH URINE: 6.5
PHOSPHATE SERPL-MCNC: 1.6 MG/DL — LOW (ref 2.5–4.5)
PHOSPHATE SERPL-MCNC: 1.9 MG/DL — LOW (ref 2.5–4.5)
PHOSPHATE SERPL-MCNC: 2.1 MG/DL — LOW (ref 2.5–4.5)
PHOSPHATE SERPL-MCNC: 2.2 MG/DL — LOW (ref 2.5–4.5)
PHOSPHATE SERPL-MCNC: 2.3 MG/DL — LOW (ref 2.5–4.5)
PHOSPHATE SERPL-MCNC: 2.4 MG/DL — LOW (ref 2.5–4.5)
PHOSPHATE SERPL-MCNC: 2.5 MG/DL — SIGNIFICANT CHANGE UP (ref 2.5–4.5)
PHOSPHATE SERPL-MCNC: 2.6 MG/DL — SIGNIFICANT CHANGE UP (ref 2.5–4.5)
PHOSPHATE SERPL-MCNC: 2.7 MG/DL — SIGNIFICANT CHANGE UP (ref 2.5–4.5)
PHOSPHATE SERPL-MCNC: 2.8 MG/DL — SIGNIFICANT CHANGE UP (ref 2.5–4.5)
PHOSPHATE SERPL-MCNC: 2.9 MG/DL — SIGNIFICANT CHANGE UP (ref 2.5–4.5)
PHOSPHATE SERPL-MCNC: 3 MG/DL — SIGNIFICANT CHANGE UP (ref 2.5–4.5)
PHOSPHATE SERPL-MCNC: 3.1 MG/DL — SIGNIFICANT CHANGE UP (ref 2.5–4.5)
PHOSPHATE SERPL-MCNC: 3.2 MG/DL — SIGNIFICANT CHANGE UP (ref 2.5–4.5)
PHOSPHATE SERPL-MCNC: 3.2 MG/DL — SIGNIFICANT CHANGE UP (ref 2.5–4.5)
PHOSPHATE SERPL-MCNC: 3.3 MG/DL — SIGNIFICANT CHANGE UP (ref 2.5–4.5)
PHOSPHATE SERPL-MCNC: 3.4 MG/DL — SIGNIFICANT CHANGE UP (ref 2.5–4.5)
PHOSPHATE SERPL-MCNC: 3.5 MG/DL — SIGNIFICANT CHANGE UP (ref 2.5–4.5)
PHOSPHATE SERPL-MCNC: 3.5 MG/DL — SIGNIFICANT CHANGE UP (ref 2.5–4.5)
PHOSPHATE SERPL-MCNC: 3.7 MG/DL — SIGNIFICANT CHANGE UP (ref 2.5–4.5)
PHOSPHATE SERPL-MCNC: 3.8 MG/DL — SIGNIFICANT CHANGE UP (ref 2.5–4.5)
PHOSPHATE SERPL-MCNC: 3.8 MG/DL — SIGNIFICANT CHANGE UP (ref 2.5–4.5)
PHOSPHATE SERPL-MCNC: 3.9 MG/DL — SIGNIFICANT CHANGE UP (ref 2.5–4.5)
PHOSPHATE SERPL-MCNC: 3.9 MG/DL — SIGNIFICANT CHANGE UP (ref 2.5–4.5)
PHOSPHATE SERPL-MCNC: 4 MG/DL — SIGNIFICANT CHANGE UP (ref 2.5–4.5)
PHOSPHATE SERPL-MCNC: 4 MG/DL — SIGNIFICANT CHANGE UP (ref 2.5–4.5)
PHOSPHATE SERPL-MCNC: 4.3 MG/DL — SIGNIFICANT CHANGE UP (ref 2.5–4.5)
PHOSPHATE SERPL-MCNC: 4.3 MG/DL — SIGNIFICANT CHANGE UP (ref 2.5–4.5)
PHOSPHATE SERPL-MCNC: 5.2 MG/DL — HIGH (ref 2.5–4.5)
PHOSPHATE SERPL-MCNC: 6.1 MG/DL — HIGH (ref 2.5–4.5)
PLAT GP IA/IIA AB SER QL IA: NEGATIVE — SIGNIFICANT CHANGE UP
PLAT GP IB/IX AB SER QL IA: NEGATIVE — SIGNIFICANT CHANGE UP
PLAT GP IIB/IIIA AB SER QL IA: NEGATIVE — SIGNIFICANT CHANGE UP
PLAT MORPH BLD: ABNORMAL
PLAT MORPH BLD: NORMAL — SIGNIFICANT CHANGE UP
PLATELET # BLD AUTO: 100 K/UL — LOW (ref 150–400)
PLATELET # BLD AUTO: 101 K/UL — LOW (ref 150–400)
PLATELET # BLD AUTO: 103 K/UL
PLATELET # BLD AUTO: 104 K/UL
PLATELET # BLD AUTO: 104 K/UL — LOW (ref 150–400)
PLATELET # BLD AUTO: 104 K/UL — LOW (ref 150–400)
PLATELET # BLD AUTO: 105 K/UL — LOW (ref 150–400)
PLATELET # BLD AUTO: 105 K/UL — LOW (ref 150–400)
PLATELET # BLD AUTO: 108 K/UL — LOW (ref 150–400)
PLATELET # BLD AUTO: 11 K/UL — CRITICAL LOW (ref 150–400)
PLATELET # BLD AUTO: 110 K/UL — LOW (ref 150–400)
PLATELET # BLD AUTO: 113 K/UL — LOW (ref 150–400)
PLATELET # BLD AUTO: 114 K/UL — LOW (ref 150–400)
PLATELET # BLD AUTO: 115 K/UL — LOW (ref 150–400)
PLATELET # BLD AUTO: 115 K/UL — LOW (ref 150–400)
PLATELET # BLD AUTO: 116 K/UL
PLATELET # BLD AUTO: 117 K/UL — LOW (ref 150–400)
PLATELET # BLD AUTO: 119 K/UL — LOW (ref 150–400)
PLATELET # BLD AUTO: 121 K/UL
PLATELET # BLD AUTO: 121 K/UL — LOW (ref 150–400)
PLATELET # BLD AUTO: 121 K/UL — LOW (ref 150–400)
PLATELET # BLD AUTO: 122 K/UL — LOW (ref 150–400)
PLATELET # BLD AUTO: 123 K/UL — LOW (ref 150–400)
PLATELET # BLD AUTO: 124 K/UL — LOW (ref 150–400)
PLATELET # BLD AUTO: 125 K/UL — LOW (ref 150–400)
PLATELET # BLD AUTO: 125 K/UL — LOW (ref 150–400)
PLATELET # BLD AUTO: 127 K/UL — LOW (ref 150–400)
PLATELET # BLD AUTO: 129 K/UL — LOW (ref 150–400)
PLATELET # BLD AUTO: 133 K/UL — LOW (ref 150–400)
PLATELET # BLD AUTO: 140 K/UL
PLATELET # BLD AUTO: 143 K/UL — LOW (ref 150–400)
PLATELET # BLD AUTO: 17 K/UL — CRITICAL LOW (ref 150–400)
PLATELET # BLD AUTO: 18 K/UL — CRITICAL LOW (ref 150–400)
PLATELET # BLD AUTO: 20 K/UL — CRITICAL LOW (ref 150–400)
PLATELET # BLD AUTO: 30 K/UL — LOW (ref 150–400)
PLATELET # BLD AUTO: 412 K/UL
PLATELET # BLD AUTO: 44 K/UL — LOW (ref 150–400)
PLATELET # BLD AUTO: 46 K/UL — LOW (ref 150–400)
PLATELET # BLD AUTO: 47 K/UL — LOW (ref 150–400)
PLATELET # BLD AUTO: 50 K/UL — LOW (ref 150–400)
PLATELET # BLD AUTO: 54 K/UL — LOW (ref 150–400)
PLATELET # BLD AUTO: 55 K/UL — LOW (ref 150–400)
PLATELET # BLD AUTO: 59 K/UL — LOW (ref 150–400)
PLATELET # BLD AUTO: 63 K/UL — LOW (ref 150–400)
PLATELET # BLD AUTO: 64 K/UL — LOW (ref 150–400)
PLATELET # BLD AUTO: 67 K/UL — LOW (ref 150–400)
PLATELET # BLD AUTO: 68 K/UL — LOW (ref 150–400)
PLATELET # BLD AUTO: 69 K/UL — LOW (ref 150–400)
PLATELET # BLD AUTO: 70 K/UL — LOW (ref 150–400)
PLATELET # BLD AUTO: 72 K/UL — LOW (ref 150–400)
PLATELET # BLD AUTO: 73 K/UL — LOW (ref 150–400)
PLATELET # BLD AUTO: 73 K/UL — LOW (ref 150–400)
PLATELET # BLD AUTO: 74 K/UL — LOW (ref 150–400)
PLATELET # BLD AUTO: 75 K/UL — LOW (ref 150–400)
PLATELET # BLD AUTO: 76 K/UL — LOW (ref 150–400)
PLATELET # BLD AUTO: 76 K/UL — LOW (ref 150–400)
PLATELET # BLD AUTO: 78 K/UL — LOW (ref 150–400)
PLATELET # BLD AUTO: 79 K/UL — LOW (ref 150–400)
PLATELET # BLD AUTO: 79 K/UL — LOW (ref 150–400)
PLATELET # BLD AUTO: 80 K/UL — LOW (ref 150–400)
PLATELET # BLD AUTO: 80 K/UL — LOW (ref 150–400)
PLATELET # BLD AUTO: 81 K/UL — LOW (ref 150–400)
PLATELET # BLD AUTO: 81 K/UL — LOW (ref 150–400)
PLATELET # BLD AUTO: 82 K/UL — LOW (ref 150–400)
PLATELET # BLD AUTO: 84 K/UL — LOW (ref 150–400)
PLATELET # BLD AUTO: 85 K/UL — LOW (ref 150–400)
PLATELET # BLD AUTO: 86 K/UL — LOW (ref 150–400)
PLATELET # BLD AUTO: 87 K/UL — LOW (ref 150–400)
PLATELET # BLD AUTO: 88 K/UL — LOW (ref 150–400)
PLATELET # BLD AUTO: 91 K/UL — LOW (ref 150–400)
PLATELET # BLD AUTO: 91 K/UL — LOW (ref 150–400)
PLATELET # BLD AUTO: 94 K/UL — LOW (ref 150–400)
PLATELET # BLD AUTO: 98 K/UL — LOW (ref 150–400)
PLATELET # BLD AUTO: 98 K/UL — LOW (ref 150–400)
PLATELET # BLD AUTO: 99 K/UL — LOW (ref 150–400)
PLATELET # BLD AUTO: 99 K/UL — LOW (ref 150–400)
PO2 BLDA: 102 MMHG — SIGNIFICANT CHANGE UP (ref 83–108)
PO2 BLDA: 105 MMHG — SIGNIFICANT CHANGE UP (ref 83–108)
PO2 BLDA: 114 MMHG — HIGH (ref 83–108)
PO2 BLDA: 122 MMHG — HIGH (ref 83–108)
PO2 BLDA: 135 MMHG — HIGH (ref 83–108)
PO2 BLDA: 138 MMHG — HIGH (ref 83–108)
PO2 BLDA: 140 MMHG — HIGH (ref 83–108)
PO2 BLDA: 159 MMHG — HIGH (ref 83–108)
PO2 BLDA: 160 MMHG — HIGH (ref 83–108)
PO2 BLDA: 161 MMHG — HIGH (ref 83–108)
PO2 BLDA: 163 MMHG — HIGH (ref 83–108)
PO2 BLDA: 163 MMHG — HIGH (ref 83–108)
PO2 BLDA: 165 MMHG — HIGH (ref 83–108)
PO2 BLDA: 173 MMHG — HIGH (ref 83–108)
PO2 BLDA: 187 MMHG — HIGH (ref 83–108)
PO2 BLDA: 190 MMHG — HIGH (ref 83–108)
PO2 BLDA: 202 MMHG — HIGH (ref 83–108)
PO2 BLDA: 204 MMHG — HIGH (ref 83–108)
PO2 BLDA: 206 MMHG — HIGH (ref 83–108)
PO2 BLDA: 208 MMHG — HIGH (ref 83–108)
PO2 BLDA: 400 MMHG — HIGH (ref 83–108)
PO2 BLDA: 61 MMHG — LOW (ref 83–108)
PO2 BLDA: 61 MMHG — LOW (ref 83–108)
PO2 BLDA: 81 MMHG — LOW (ref 83–108)
PO2 BLDA: 83 MMHG — SIGNIFICANT CHANGE UP (ref 83–108)
PO2 BLDA: 85 MMHG — SIGNIFICANT CHANGE UP (ref 83–108)
PO2 BLDA: 88 MMHG — SIGNIFICANT CHANGE UP (ref 83–108)
PO2 BLDA: 90 MMHG — SIGNIFICANT CHANGE UP (ref 83–108)
PO2 BLDA: 91 MMHG — SIGNIFICANT CHANGE UP (ref 83–108)
PO2 BLDA: 93 MMHG — SIGNIFICANT CHANGE UP (ref 83–108)
PO2 BLDA: 95 MMHG — SIGNIFICANT CHANGE UP (ref 83–108)
PO2 BLDA: 97 MMHG — SIGNIFICANT CHANGE UP (ref 83–108)
PO2 BLDA: 98 MMHG — SIGNIFICANT CHANGE UP (ref 83–108)
PO2 BLDV: 35 MMHG — SIGNIFICANT CHANGE UP (ref 25–45)
PO2 BLDV: 37 MMHG — SIGNIFICANT CHANGE UP (ref 25–45)
PO2 BLDV: 40 MMHG — SIGNIFICANT CHANGE UP (ref 25–45)
PO2 BLDV: 43 MMHG — SIGNIFICANT CHANGE UP (ref 25–45)
PO2 BLDV: 60 MMHG — HIGH (ref 25–45)
POIKILOCYTOSIS BLD QL AUTO: SIGNIFICANT CHANGE UP
POIKILOCYTOSIS BLD QL AUTO: SLIGHT — SIGNIFICANT CHANGE UP
POLYCHROMASIA BLD QL SMEAR: SLIGHT — SIGNIFICANT CHANGE UP
POTASSIUM BLDA-SCNC: 5.3 MMOL/L — HIGH (ref 3.5–5.1)
POTASSIUM BLDV-SCNC: 3.9 MMOL/L — SIGNIFICANT CHANGE UP (ref 3.5–5.1)
POTASSIUM BLDV-SCNC: 4.3 MMOL/L — SIGNIFICANT CHANGE UP (ref 3.5–5.1)
POTASSIUM SERPL-MCNC: 2.6 MMOL/L — CRITICAL LOW (ref 3.5–5.3)
POTASSIUM SERPL-MCNC: 2.7 MMOL/L — CRITICAL LOW (ref 3.5–5.3)
POTASSIUM SERPL-MCNC: 2.8 MMOL/L — CRITICAL LOW (ref 3.5–5.3)
POTASSIUM SERPL-MCNC: 2.8 MMOL/L — CRITICAL LOW (ref 3.5–5.3)
POTASSIUM SERPL-MCNC: 3.1 MMOL/L — LOW (ref 3.5–5.3)
POTASSIUM SERPL-MCNC: 3.2 MMOL/L — LOW (ref 3.5–5.3)
POTASSIUM SERPL-MCNC: 3.2 MMOL/L — LOW (ref 3.5–5.3)
POTASSIUM SERPL-MCNC: 3.3 MMOL/L — LOW (ref 3.5–5.3)
POTASSIUM SERPL-MCNC: 3.4 MMOL/L — LOW (ref 3.5–5.3)
POTASSIUM SERPL-MCNC: 3.5 MMOL/L — SIGNIFICANT CHANGE UP (ref 3.5–5.3)
POTASSIUM SERPL-MCNC: 3.6 MMOL/L — SIGNIFICANT CHANGE UP (ref 3.5–5.3)
POTASSIUM SERPL-MCNC: 3.7 MMOL/L — SIGNIFICANT CHANGE UP (ref 3.5–5.3)
POTASSIUM SERPL-MCNC: 3.8 MMOL/L — SIGNIFICANT CHANGE UP (ref 3.5–5.3)
POTASSIUM SERPL-MCNC: 3.9 MMOL/L — SIGNIFICANT CHANGE UP (ref 3.5–5.3)
POTASSIUM SERPL-MCNC: 4 MMOL/L — SIGNIFICANT CHANGE UP (ref 3.5–5.3)
POTASSIUM SERPL-MCNC: 4.1 MMOL/L — SIGNIFICANT CHANGE UP (ref 3.5–5.3)
POTASSIUM SERPL-MCNC: 4.2 MMOL/L — SIGNIFICANT CHANGE UP (ref 3.5–5.3)
POTASSIUM SERPL-MCNC: 4.3 MMOL/L — SIGNIFICANT CHANGE UP (ref 3.5–5.3)
POTASSIUM SERPL-MCNC: 4.4 MMOL/L — SIGNIFICANT CHANGE UP (ref 3.5–5.3)
POTASSIUM SERPL-MCNC: 4.5 MMOL/L — SIGNIFICANT CHANGE UP (ref 3.5–5.3)
POTASSIUM SERPL-MCNC: 4.6 MMOL/L — SIGNIFICANT CHANGE UP (ref 3.5–5.3)
POTASSIUM SERPL-MCNC: 4.8 MMOL/L — SIGNIFICANT CHANGE UP (ref 3.5–5.3)
POTASSIUM SERPL-MCNC: 4.8 MMOL/L — SIGNIFICANT CHANGE UP (ref 3.5–5.3)
POTASSIUM SERPL-MCNC: 4.9 MMOL/L — SIGNIFICANT CHANGE UP (ref 3.5–5.3)
POTASSIUM SERPL-MCNC: 5 MMOL/L — SIGNIFICANT CHANGE UP (ref 3.5–5.3)
POTASSIUM SERPL-MCNC: 5 MMOL/L — SIGNIFICANT CHANGE UP (ref 3.5–5.3)
POTASSIUM SERPL-MCNC: 5.1 MMOL/L — SIGNIFICANT CHANGE UP (ref 3.5–5.3)
POTASSIUM SERPL-MCNC: SIGNIFICANT CHANGE UP (ref 3.5–5.3)
POTASSIUM SERPL-MCNC: SIGNIFICANT CHANGE UP MMOL/L (ref 3.5–5.3)
POTASSIUM SERPL-SCNC: 2.6 MMOL/L — CRITICAL LOW (ref 3.5–5.3)
POTASSIUM SERPL-SCNC: 2.7 MMOL/L — CRITICAL LOW (ref 3.5–5.3)
POTASSIUM SERPL-SCNC: 2.8 MMOL/L — CRITICAL LOW (ref 3.5–5.3)
POTASSIUM SERPL-SCNC: 2.8 MMOL/L — CRITICAL LOW (ref 3.5–5.3)
POTASSIUM SERPL-SCNC: 3.1 MMOL/L — LOW (ref 3.5–5.3)
POTASSIUM SERPL-SCNC: 3.2 MMOL/L — LOW (ref 3.5–5.3)
POTASSIUM SERPL-SCNC: 3.2 MMOL/L — LOW (ref 3.5–5.3)
POTASSIUM SERPL-SCNC: 3.3 MMOL/L — LOW (ref 3.5–5.3)
POTASSIUM SERPL-SCNC: 3.4 MMOL/L — LOW (ref 3.5–5.3)
POTASSIUM SERPL-SCNC: 3.5 MMOL/L — SIGNIFICANT CHANGE UP (ref 3.5–5.3)
POTASSIUM SERPL-SCNC: 3.6 MMOL/L — SIGNIFICANT CHANGE UP (ref 3.5–5.3)
POTASSIUM SERPL-SCNC: 3.7 MMOL/L — SIGNIFICANT CHANGE UP (ref 3.5–5.3)
POTASSIUM SERPL-SCNC: 3.8 MMOL/L — SIGNIFICANT CHANGE UP (ref 3.5–5.3)
POTASSIUM SERPL-SCNC: 3.9 MMOL/L — SIGNIFICANT CHANGE UP (ref 3.5–5.3)
POTASSIUM SERPL-SCNC: 4 MMOL/L — SIGNIFICANT CHANGE UP (ref 3.5–5.3)
POTASSIUM SERPL-SCNC: 4.1 MMOL/L — SIGNIFICANT CHANGE UP (ref 3.5–5.3)
POTASSIUM SERPL-SCNC: 4.2 MMOL/L — SIGNIFICANT CHANGE UP (ref 3.5–5.3)
POTASSIUM SERPL-SCNC: 4.3 MMOL/L — SIGNIFICANT CHANGE UP (ref 3.5–5.3)
POTASSIUM SERPL-SCNC: 4.4 MMOL/L — SIGNIFICANT CHANGE UP (ref 3.5–5.3)
POTASSIUM SERPL-SCNC: 4.5 MMOL/L
POTASSIUM SERPL-SCNC: 4.5 MMOL/L — SIGNIFICANT CHANGE UP (ref 3.5–5.3)
POTASSIUM SERPL-SCNC: 4.6 MMOL/L — SIGNIFICANT CHANGE UP (ref 3.5–5.3)
POTASSIUM SERPL-SCNC: 4.7 MMOL/L
POTASSIUM SERPL-SCNC: 4.8 MMOL/L — SIGNIFICANT CHANGE UP (ref 3.5–5.3)
POTASSIUM SERPL-SCNC: 4.8 MMOL/L — SIGNIFICANT CHANGE UP (ref 3.5–5.3)
POTASSIUM SERPL-SCNC: 4.9 MMOL/L — SIGNIFICANT CHANGE UP (ref 3.5–5.3)
POTASSIUM SERPL-SCNC: 5 MMOL/L — SIGNIFICANT CHANGE UP (ref 3.5–5.3)
POTASSIUM SERPL-SCNC: 5 MMOL/L — SIGNIFICANT CHANGE UP (ref 3.5–5.3)
POTASSIUM SERPL-SCNC: 5.1 MMOL/L — SIGNIFICANT CHANGE UP (ref 3.5–5.3)
POTASSIUM SERPL-SCNC: SIGNIFICANT CHANGE UP (ref 3.5–5.3)
POTASSIUM SERPL-SCNC: SIGNIFICANT CHANGE UP MMOL/L (ref 3.5–5.3)
PROCALCITONIN SERPL-MCNC: 0.14 NG/ML — HIGH (ref 0.02–0.1)
PROCALCITONIN SERPL-MCNC: 0.22 NG/ML — HIGH (ref 0.02–0.1)
PROCALCITONIN SERPL-MCNC: 0.32 NG/ML — HIGH (ref 0.02–0.1)
PROCALCITONIN SERPL-MCNC: 0.36 NG/ML — HIGH (ref 0.02–0.1)
PROCALCITONIN SERPL-MCNC: 20.32 NG/ML — HIGH (ref 0.02–0.1)
PROCALCITONIN SERPL-MCNC: 3.32 NG/ML — HIGH (ref 0.02–0.1)
PROMYELOCYTES # FLD: 0.9 % — HIGH (ref 0–0)
PROT ?TM UR-MCNC: 20 MG/DL — HIGH (ref 0–12)
PROT SERPL-MCNC: 3.6 G/DL — LOW (ref 6–8.3)
PROT SERPL-MCNC: 3.7 G/DL — LOW (ref 6–8.3)
PROT SERPL-MCNC: 4 G/DL — LOW (ref 6–8.3)
PROT SERPL-MCNC: 4.1 G/DL — LOW (ref 6–8.3)
PROT SERPL-MCNC: 4.2 G/DL — LOW (ref 6–8.3)
PROT SERPL-MCNC: 4.2 G/DL — LOW (ref 6–8.3)
PROT SERPL-MCNC: 4.3 G/DL — LOW (ref 6–8.3)
PROT SERPL-MCNC: 4.4 G/DL — LOW (ref 6–8.3)
PROT SERPL-MCNC: 5.4 G/DL — LOW (ref 6–8.3)
PROT SERPL-MCNC: 5.4 G/DL — LOW (ref 6–8.3)
PROT SERPL-MCNC: 5.7 G/DL — LOW (ref 6–8.3)
PROT SERPL-MCNC: 6 G/DL — SIGNIFICANT CHANGE UP (ref 6–8.3)
PROT SERPL-MCNC: 6.3 G/DL — SIGNIFICANT CHANGE UP (ref 6–8.3)
PROT SERPL-MCNC: 6.3 G/DL — SIGNIFICANT CHANGE UP (ref 6–8.3)
PROT SERPL-MCNC: 6.4 G/DL — SIGNIFICANT CHANGE UP (ref 6–8.3)
PROT SERPL-MCNC: 6.4 G/DL — SIGNIFICANT CHANGE UP (ref 6–8.3)
PROT SERPL-MCNC: 6.5 G/DL — SIGNIFICANT CHANGE UP (ref 6–8.3)
PROT SERPL-MCNC: 6.5 G/DL — SIGNIFICANT CHANGE UP (ref 6–8.3)
PROT SERPL-MCNC: 6.6 G/DL — SIGNIFICANT CHANGE UP (ref 6–8.3)
PROT SERPL-MCNC: 6.7 G/DL
PROT SERPL-MCNC: 6.8 G/DL — SIGNIFICANT CHANGE UP (ref 6–8.3)
PROT SERPL-MCNC: 7 G/DL — SIGNIFICANT CHANGE UP (ref 6–8.3)
PROT SERPL-MCNC: 7.3 G/DL
PROT SERPL-MCNC: 7.3 G/DL — SIGNIFICANT CHANGE UP (ref 6–8.3)
PROT SERPL-MCNC: 7.4 G/DL
PROT SERPL-MCNC: 7.7 G/DL
PROT UR-MCNC: 100 MG/DL
PROT UR-MCNC: 30 MG/DL
PROT UR-MCNC: >=300 MG/DL
PROT UR-MCNC: NEGATIVE MG/DL — SIGNIFICANT CHANGE UP
PROTEIN URINE: ABNORMAL
PROTEUS SP DNA BLD POS QL NAA+NON-PROBE: SIGNIFICANT CHANGE UP
PROTHROM AB SERPL-ACNC: 12.8 SEC — SIGNIFICANT CHANGE UP (ref 10.5–13.4)
PROTHROM AB SERPL-ACNC: 12.8 SEC — SIGNIFICANT CHANGE UP (ref 10.5–13.4)
PROTHROM AB SERPL-ACNC: 13.1 SEC — SIGNIFICANT CHANGE UP (ref 10.5–13.4)
PROTHROM AB SERPL-ACNC: 13.5 SEC — HIGH (ref 9.5–13)
PROTHROM AB SERPL-ACNC: 13.9 SEC — HIGH (ref 10.5–13.4)
PROTHROM AB SERPL-ACNC: 14.3 SEC — HIGH (ref 9.5–13)
PROTHROM AB SERPL-ACNC: 14.4 SEC — HIGH (ref 9.5–13)
PROTHROM AB SERPL-ACNC: 16.2 SEC — HIGH (ref 10.5–13.4)
PROTHROM AB SERPL-ACNC: 18.1 SEC — HIGH (ref 10.5–13.4)
PROTHROM AB SERPL-ACNC: 19.3 SEC — HIGH (ref 9.5–13)
PROTHROM AB SERPL-ACNC: 20.7 SEC — HIGH (ref 9.5–13)
PROTHROM AB SERPL-ACNC: 22.2 SEC — HIGH (ref 9.5–13)
PT BLD: 13.2 SEC
RBC # BLD: 1.96 M/UL — LOW (ref 4.2–5.8)
RBC # BLD: 1.98 M/UL — LOW (ref 4.2–5.8)
RBC # BLD: 2.02 M/UL — LOW (ref 4.2–5.8)
RBC # BLD: 2.14 M/UL — LOW (ref 4.2–5.8)
RBC # BLD: 2.19 M/UL — LOW (ref 4.2–5.8)
RBC # BLD: 2.19 M/UL — LOW (ref 4.2–5.8)
RBC # BLD: 2.21 M/UL — LOW (ref 4.2–5.8)
RBC # BLD: 2.24 M/UL — LOW (ref 4.2–5.8)
RBC # BLD: 2.27 M/UL — LOW (ref 4.2–5.8)
RBC # BLD: 2.27 M/UL — LOW (ref 4.2–5.8)
RBC # BLD: 2.29 M/UL — LOW (ref 4.2–5.8)
RBC # BLD: 2.3 M/UL — LOW (ref 4.2–5.8)
RBC # BLD: 2.31 M/UL — LOW (ref 4.2–5.8)
RBC # BLD: 2.35 M/UL — LOW (ref 4.2–5.8)
RBC # BLD: 2.37 M/UL — LOW (ref 4.2–5.8)
RBC # BLD: 2.38 M/UL — LOW (ref 4.2–5.8)
RBC # BLD: 2.4 M/UL — LOW (ref 4.2–5.8)
RBC # BLD: 2.43 M/UL — LOW (ref 4.2–5.8)
RBC # BLD: 2.46 M/UL — LOW (ref 4.2–5.8)
RBC # BLD: 2.47 M/UL — LOW (ref 4.2–5.8)
RBC # BLD: 2.47 M/UL — LOW (ref 4.2–5.8)
RBC # BLD: 2.51 M/UL — LOW (ref 4.2–5.8)
RBC # BLD: 2.56 M/UL — LOW (ref 4.2–5.8)
RBC # BLD: 2.58 M/UL — LOW (ref 4.2–5.8)
RBC # BLD: 2.62 M/UL — LOW (ref 4.2–5.8)
RBC # BLD: 2.65 M/UL — LOW (ref 4.2–5.8)
RBC # BLD: 2.68 M/UL — LOW (ref 4.2–5.8)
RBC # BLD: 2.68 M/UL — LOW (ref 4.2–5.8)
RBC # BLD: 2.7 M/UL — LOW (ref 4.2–5.8)
RBC # BLD: 2.73 M/UL — LOW (ref 4.2–5.8)
RBC # BLD: 2.77 M/UL — LOW (ref 4.2–5.8)
RBC # BLD: 2.77 M/UL — LOW (ref 4.2–5.8)
RBC # BLD: 2.78 M/UL — LOW (ref 4.2–5.8)
RBC # BLD: 2.79 M/UL — LOW (ref 4.2–5.8)
RBC # BLD: 2.8 M/UL — LOW (ref 4.2–5.8)
RBC # BLD: 2.82 M/UL — LOW (ref 4.2–5.8)
RBC # BLD: 2.82 M/UL — LOW (ref 4.2–5.8)
RBC # BLD: 2.83 M/UL — LOW (ref 4.2–5.8)
RBC # BLD: 2.84 M/UL — LOW (ref 4.2–5.8)
RBC # BLD: 2.89 M/UL — LOW (ref 4.2–5.8)
RBC # BLD: 2.89 M/UL — LOW (ref 4.2–5.8)
RBC # BLD: 2.9 M/UL — LOW (ref 4.2–5.8)
RBC # BLD: 2.91 M/UL — LOW (ref 4.2–5.8)
RBC # BLD: 2.94 M/UL — LOW (ref 4.2–5.8)
RBC # BLD: 2.95 M/UL — LOW (ref 4.2–5.8)
RBC # BLD: 2.95 M/UL — LOW (ref 4.2–5.8)
RBC # BLD: 2.96 M/UL — LOW (ref 4.2–5.8)
RBC # BLD: 2.97 M/UL — LOW (ref 4.2–5.8)
RBC # BLD: 2.99 M/UL — LOW (ref 4.2–5.8)
RBC # BLD: 3.02 M/UL — LOW (ref 4.2–5.8)
RBC # BLD: 3.09 M/UL — LOW (ref 4.2–5.8)
RBC # BLD: 3.09 M/UL — LOW (ref 4.2–5.8)
RBC # BLD: 3.1 M/UL — LOW (ref 4.2–5.8)
RBC # BLD: 3.13 M/UL — LOW (ref 4.2–5.8)
RBC # BLD: 3.18 M/UL — LOW (ref 4.2–5.8)
RBC # BLD: 3.21 M/UL — LOW (ref 4.2–5.8)
RBC # BLD: 3.25 M/UL — LOW (ref 4.2–5.8)
RBC # BLD: 3.28 M/UL — LOW (ref 4.2–5.8)
RBC # BLD: 3.3 M/UL — LOW (ref 4.2–5.8)
RBC # BLD: 3.33 M/UL — LOW (ref 4.2–5.8)
RBC # BLD: 3.33 M/UL — LOW (ref 4.2–5.8)
RBC # BLD: 3.37 M/UL — LOW (ref 4.2–5.8)
RBC # BLD: 3.47 M/UL — LOW (ref 4.2–5.8)
RBC # BLD: 3.52 M/UL — LOW (ref 4.2–5.8)
RBC # BLD: 3.53 M/UL — LOW (ref 4.2–5.8)
RBC # BLD: 3.57 M/UL — LOW (ref 4.2–5.8)
RBC # BLD: 3.58 M/UL
RBC # BLD: 3.58 M/UL — LOW (ref 4.2–5.8)
RBC # BLD: 3.6 M/UL — LOW (ref 4.2–5.8)
RBC # BLD: 3.61 M/UL — LOW (ref 4.2–5.8)
RBC # BLD: 3.61 M/UL — LOW (ref 4.2–5.8)
RBC # BLD: 3.66 M/UL — LOW (ref 4.2–5.8)
RBC # BLD: 3.66 M/UL — LOW (ref 4.2–5.8)
RBC # BLD: 3.68 M/UL
RBC # BLD: 3.69 M/UL — LOW (ref 4.2–5.8)
RBC # BLD: 3.74 M/UL — LOW (ref 4.2–5.8)
RBC # BLD: 3.75 M/UL — LOW (ref 4.2–5.8)
RBC # BLD: 3.76 M/UL
RBC # BLD: 3.8 M/UL
RBC # BLD: 3.81 M/UL
RBC # BLD: 3.81 M/UL
RBC # BLD: 3.81 M/UL — LOW (ref 4.2–5.8)
RBC # BLD: 3.82 M/UL — LOW (ref 4.2–5.8)
RBC # BLD: 3.82 M/UL — LOW (ref 4.2–5.8)
RBC # BLD: 3.87 M/UL — LOW (ref 4.2–5.8)
RBC # BLD: 3.88 M/UL — LOW (ref 4.2–5.8)
RBC # BLD: 3.96 M/UL — LOW (ref 4.2–5.8)
RBC # FLD: 11.5 % — SIGNIFICANT CHANGE UP (ref 10.3–14.5)
RBC # FLD: 11.6 % — SIGNIFICANT CHANGE UP (ref 10.3–14.5)
RBC # FLD: 11.7 % — SIGNIFICANT CHANGE UP (ref 10.3–14.5)
RBC # FLD: 11.8 % — SIGNIFICANT CHANGE UP (ref 10.3–14.5)
RBC # FLD: 11.8 % — SIGNIFICANT CHANGE UP (ref 10.3–14.5)
RBC # FLD: 11.9 % — SIGNIFICANT CHANGE UP (ref 10.3–14.5)
RBC # FLD: 12 % — SIGNIFICANT CHANGE UP (ref 10.3–14.5)
RBC # FLD: 12.1 % — SIGNIFICANT CHANGE UP (ref 10.3–14.5)
RBC # FLD: 12.1 % — SIGNIFICANT CHANGE UP (ref 10.3–14.5)
RBC # FLD: 12.2 % — SIGNIFICANT CHANGE UP (ref 10.3–14.5)
RBC # FLD: 12.3 %
RBC # FLD: 12.4 % — SIGNIFICANT CHANGE UP (ref 10.3–14.5)
RBC # FLD: 12.5 %
RBC # FLD: 12.5 %
RBC # FLD: 12.5 % — SIGNIFICANT CHANGE UP (ref 10.3–14.5)
RBC # FLD: 12.5 % — SIGNIFICANT CHANGE UP (ref 10.3–14.5)
RBC # FLD: 12.6 % — SIGNIFICANT CHANGE UP (ref 10.3–14.5)
RBC # FLD: 12.7 % — SIGNIFICANT CHANGE UP (ref 10.3–14.5)
RBC # FLD: 12.8 % — SIGNIFICANT CHANGE UP (ref 10.3–14.5)
RBC # FLD: 12.9 % — SIGNIFICANT CHANGE UP (ref 10.3–14.5)
RBC # FLD: 12.9 % — SIGNIFICANT CHANGE UP (ref 10.3–14.5)
RBC # FLD: 13 % — SIGNIFICANT CHANGE UP (ref 10.3–14.5)
RBC # FLD: 13.1 % — SIGNIFICANT CHANGE UP (ref 10.3–14.5)
RBC # FLD: 13.2 %
RBC # FLD: 13.2 % — SIGNIFICANT CHANGE UP (ref 10.3–14.5)
RBC # FLD: 13.3 % — SIGNIFICANT CHANGE UP (ref 10.3–14.5)
RBC # FLD: 13.3 % — SIGNIFICANT CHANGE UP (ref 10.3–14.5)
RBC # FLD: 13.6 %
RBC # FLD: 13.7 % — SIGNIFICANT CHANGE UP (ref 10.3–14.5)
RBC # FLD: 13.7 % — SIGNIFICANT CHANGE UP (ref 10.3–14.5)
RBC # FLD: 13.8 % — SIGNIFICANT CHANGE UP (ref 10.3–14.5)
RBC # FLD: 14 % — SIGNIFICANT CHANGE UP (ref 10.3–14.5)
RBC # FLD: 14 % — SIGNIFICANT CHANGE UP (ref 10.3–14.5)
RBC # FLD: 14.2 % — SIGNIFICANT CHANGE UP (ref 10.3–14.5)
RBC # FLD: 14.3 % — SIGNIFICANT CHANGE UP (ref 10.3–14.5)
RBC # FLD: 14.5 % — SIGNIFICANT CHANGE UP (ref 10.3–14.5)
RBC # FLD: 14.5 % — SIGNIFICANT CHANGE UP (ref 10.3–14.5)
RBC # FLD: 14.6 % — HIGH (ref 10.3–14.5)
RBC # FLD: 14.6 % — HIGH (ref 10.3–14.5)
RBC # FLD: 14.7 % — HIGH (ref 10.3–14.5)
RBC # FLD: 15 %
RBC # FLD: 15.2 % — HIGH (ref 10.3–14.5)
RBC # FLD: 15.4 % — HIGH (ref 10.3–14.5)
RBC # FLD: 15.5 % — HIGH (ref 10.3–14.5)
RBC # FLD: 16.1 % — HIGH (ref 10.3–14.5)
RBC # FLD: 16.2 % — HIGH (ref 10.3–14.5)
RBC # FLD: 16.3 % — HIGH (ref 10.3–14.5)
RBC # FLD: 16.3 % — HIGH (ref 10.3–14.5)
RBC # FLD: 16.4 % — HIGH (ref 10.3–14.5)
RBC # FLD: 16.6 % — HIGH (ref 10.3–14.5)
RBC # FLD: 16.6 % — HIGH (ref 10.3–14.5)
RBC # FLD: 16.7 % — HIGH (ref 10.3–14.5)
RBC # FLD: 16.8 % — HIGH (ref 10.3–14.5)
RBC # FLD: 16.8 % — HIGH (ref 10.3–14.5)
RBC # FLD: 16.9 % — HIGH (ref 10.3–14.5)
RBC # FLD: 17.1 % — HIGH (ref 10.3–14.5)
RBC # FLD: 17.2 % — HIGH (ref 10.3–14.5)
RBC # FLD: 17.2 % — HIGH (ref 10.3–14.5)
RBC # FLD: 17.4 % — HIGH (ref 10.3–14.5)
RBC # FLD: 17.6 % — HIGH (ref 10.3–14.5)
RBC # FLD: 17.7 % — HIGH (ref 10.3–14.5)
RBC # FLD: 17.8 % — HIGH (ref 10.3–14.5)
RBC # FLD: 17.9 % — HIGH (ref 10.3–14.5)
RBC # FLD: 18.2 % — HIGH (ref 10.3–14.5)
RBC # FLD: 18.2 % — HIGH (ref 10.3–14.5)
RBC # FLD: 18.4 % — HIGH (ref 10.3–14.5)
RBC # FLD: 18.7 % — HIGH (ref 10.3–14.5)
RBC # FLD: 19.2 % — HIGH (ref 10.3–14.5)
RBC # FLD: 19.3 % — HIGH (ref 10.3–14.5)
RBC # FLD: 19.4 % — HIGH (ref 10.3–14.5)
RBC # FLD: 19.7 % — HIGH (ref 10.3–14.5)
RBC # FLD: 20 % — HIGH (ref 10.3–14.5)
RBC # FLD: 20.6 % — HIGH (ref 10.3–14.5)
RBC # FLD: 20.9 % — HIGH (ref 10.3–14.5)
RBC # FLD: 20.9 % — HIGH (ref 10.3–14.5)
RBC # FLD: 21.1 % — HIGH (ref 10.3–14.5)
RBC BLD AUTO: ABNORMAL
RBC BLD AUTO: SIGNIFICANT CHANGE UP
RBC CASTS # UR COMP ASSIST: < 5 /HPF — SIGNIFICANT CHANGE UP
RBC CASTS # UR COMP ASSIST: > 10 /HPF
RBC CASTS # UR COMP ASSIST: ABNORMAL /HPF
RED BLOOD CELLS URINE: 6 /HPF
RETICS #: 115.9 K/UL — SIGNIFICANT CHANGE UP (ref 25–125)
RETICS #: 35.5 K/UL — SIGNIFICANT CHANGE UP (ref 25–125)
RETICS #: 70.1 K/UL — SIGNIFICANT CHANGE UP (ref 25–125)
RETICS #: 70.4 K/UL — SIGNIFICANT CHANGE UP (ref 25–125)
RETICS #: 74.1 K/UL — SIGNIFICANT CHANGE UP (ref 25–125)
RETICS/RBC NFR: 1.3 % — SIGNIFICANT CHANGE UP (ref 0.5–2.5)
RETICS/RBC NFR: 1.9 % — SIGNIFICANT CHANGE UP (ref 0.5–2.5)
RETICS/RBC NFR: 2.3 % — SIGNIFICANT CHANGE UP (ref 0.5–2.5)
RETICS/RBC NFR: 2.3 % — SIGNIFICANT CHANGE UP (ref 0.5–2.5)
RETICS/RBC NFR: 5.3 % — HIGH (ref 0.5–2.5)
RH IG SCN BLD-IMP: POSITIVE — SIGNIFICANT CHANGE UP
S AUREUS DNA NOSE QL NAA+PROBE: POSITIVE
S AUREUS DNA NOSE QL NAA+PROBE: SIGNIFICANT CHANGE UP
S LUGDUNENSIS DNA SNV QL NAA+NON-PROBE: SIGNIFICANT CHANGE UP
S MARCESCENS DNA BLD POS NAA+NON-PROBE: SIGNIFICANT CHANGE UP
S PNEUM DNA BLD POS QL NAA+NON-PROBE: SIGNIFICANT CHANGE UP
S PYO DNA BLD POS QL NAA+NON-PROBE: SIGNIFICANT CHANGE UP
SALMONELLA DNA BLD POS QL NAA+NON-PROBE: SIGNIFICANT CHANGE UP
SAO2 % BLDA: 100 % — HIGH (ref 94–98)
SAO2 % BLDA: 100 % — HIGH (ref 94–98)
SAO2 % BLDA: 83.6 % — LOW (ref 94–98)
SAO2 % BLDA: 91.8 % — LOW (ref 94–98)
SAO2 % BLDA: 96.8 % — SIGNIFICANT CHANGE UP (ref 94–98)
SAO2 % BLDA: 97.2 % — SIGNIFICANT CHANGE UP (ref 94–98)
SAO2 % BLDA: 97.3 % — SIGNIFICANT CHANGE UP (ref 94–98)
SAO2 % BLDA: 98.2 % — HIGH (ref 94–98)
SAO2 % BLDA: 98.4 % — HIGH (ref 94–98)
SAO2 % BLDA: 98.4 % — HIGH (ref 94–98)
SAO2 % BLDA: 98.5 % — HIGH (ref 94–98)
SAO2 % BLDA: 98.6 % — HIGH (ref 94–98)
SAO2 % BLDA: 98.7 % — HIGH (ref 94–98)
SAO2 % BLDA: 98.7 % — HIGH (ref 94–98)
SAO2 % BLDA: 98.8 % — HIGH (ref 94–98)
SAO2 % BLDA: 98.9 % — HIGH (ref 94–98)
SAO2 % BLDA: 99 % — HIGH (ref 94–98)
SAO2 % BLDA: 99.1 % — HIGH (ref 94–98)
SAO2 % BLDA: 99.2 % — HIGH (ref 94–98)
SAO2 % BLDA: 99.2 % — HIGH (ref 94–98)
SAO2 % BLDA: 99.3 % — HIGH (ref 94–98)
SAO2 % BLDA: 99.4 % — HIGH (ref 94–98)
SAO2 % BLDA: 99.5 % — HIGH (ref 94–98)
SAO2 % BLDA: 99.7 % — HIGH (ref 94–98)
SAO2 % BLDA: 99.7 % — HIGH (ref 94–98)
SAO2 % BLDA: 99.8 % — HIGH (ref 94–98)
SAO2 % BLDA: 99.9 % — HIGH (ref 94–98)
SAO2 % BLDA: 99.9 % — HIGH (ref 94–98)
SAO2 % BLDV: 57.3 % — LOW (ref 67–88)
SAO2 % BLDV: 61 % — LOW (ref 67–88)
SAO2 % BLDV: 66.3 % — LOW (ref 67–88)
SAO2 % BLDV: 68 % — SIGNIFICANT CHANGE UP (ref 67–88)
SAO2 % BLDV: 84.1 % — SIGNIFICANT CHANGE UP (ref 67–88)
SARS-COV-2 N GENE NPH QL NAA+PROBE: NOT DETECTED
SARS-COV-2 RNA SPEC QL NAA+PROBE: SIGNIFICANT CHANGE UP
SARS-COV-2 RNA SPEC QL NAA+PROBE: SIGNIFICANT CHANGE UP
SCHISTOCYTES BLD QL AUTO: SLIGHT — SIGNIFICANT CHANGE UP
SICKLE CELLS BLD QL SMEAR: SIGNIFICANT CHANGE UP
SMUDGE CELLS # BLD: PRESENT — SIGNIFICANT CHANGE UP
SODIUM BLDA-SCNC: 118 MMOL/L — CRITICAL LOW (ref 136–145)
SODIUM SERPL-SCNC: 114 MMOL/L — CRITICAL LOW (ref 135–145)
SODIUM SERPL-SCNC: 115 MMOL/L — CRITICAL LOW (ref 135–145)
SODIUM SERPL-SCNC: 117 MMOL/L — CRITICAL LOW (ref 135–145)
SODIUM SERPL-SCNC: 118 MMOL/L — CRITICAL LOW (ref 135–145)
SODIUM SERPL-SCNC: 119 MMOL/L — CRITICAL LOW (ref 135–145)
SODIUM SERPL-SCNC: 120 MMOL/L — CRITICAL LOW (ref 135–145)
SODIUM SERPL-SCNC: 121 MMOL/L — LOW (ref 135–145)
SODIUM SERPL-SCNC: 121 MMOL/L — LOW (ref 135–145)
SODIUM SERPL-SCNC: 122 MMOL/L — LOW (ref 135–145)
SODIUM SERPL-SCNC: 123 MMOL/L — LOW (ref 135–145)
SODIUM SERPL-SCNC: 124 MMOL/L — LOW (ref 135–145)
SODIUM SERPL-SCNC: 125 MMOL/L — LOW (ref 135–145)
SODIUM SERPL-SCNC: 126 MMOL/L
SODIUM SERPL-SCNC: 126 MMOL/L — LOW (ref 135–145)
SODIUM SERPL-SCNC: 127 MMOL/L — LOW (ref 135–145)
SODIUM SERPL-SCNC: 128 MMOL/L — LOW (ref 135–145)
SODIUM SERPL-SCNC: 129 MMOL/L — LOW (ref 135–145)
SODIUM SERPL-SCNC: 130 MMOL/L — LOW (ref 135–145)
SODIUM SERPL-SCNC: 131 MMOL/L — LOW (ref 135–145)
SODIUM SERPL-SCNC: 132 MMOL/L — LOW (ref 135–145)
SODIUM SERPL-SCNC: 133 MMOL/L
SODIUM SERPL-SCNC: 133 MMOL/L — LOW (ref 135–145)
SODIUM SERPL-SCNC: 134 MMOL/L
SODIUM SERPL-SCNC: 134 MMOL/L — LOW (ref 135–145)
SODIUM SERPL-SCNC: 135 MMOL/L
SODIUM SERPL-SCNC: 135 MMOL/L — SIGNIFICANT CHANGE UP (ref 135–145)
SODIUM SERPL-SCNC: 136 MMOL/L — SIGNIFICANT CHANGE UP (ref 135–145)
SODIUM SERPL-SCNC: 137 MMOL/L — SIGNIFICANT CHANGE UP (ref 135–145)
SODIUM SERPL-SCNC: 138 MMOL/L — SIGNIFICANT CHANGE UP (ref 135–145)
SODIUM SERPL-SCNC: 139 MMOL/L — SIGNIFICANT CHANGE UP (ref 135–145)
SODIUM SERPL-SCNC: 141 MMOL/L — SIGNIFICANT CHANGE UP (ref 135–145)
SODIUM SERPL-SCNC: 142 MMOL/L — SIGNIFICANT CHANGE UP (ref 135–145)
SODIUM SERPL-SCNC: 143 MMOL/L — SIGNIFICANT CHANGE UP (ref 135–145)
SODIUM SERPL-SCNC: SIGNIFICANT CHANGE UP MMOL/L (ref 135–145)
SODIUM UR-SCNC: 116 MMOL/L — SIGNIFICANT CHANGE UP
SODIUM UR-SCNC: 120 MMOL/L — SIGNIFICANT CHANGE UP
SODIUM UR-SCNC: 123 MMOL/L — SIGNIFICANT CHANGE UP
SODIUM UR-SCNC: 125 MMOL/L — SIGNIFICANT CHANGE UP
SODIUM UR-SCNC: 190 MMOL/L — SIGNIFICANT CHANGE UP
SODIUM UR-SCNC: 61 MMOL/L — SIGNIFICANT CHANGE UP
SODIUM UR-SCNC: 82 MMOL/L — SIGNIFICANT CHANGE UP
SODIUM UR-SCNC: 94 MMOL/L — SIGNIFICANT CHANGE UP
SODIUM UR-SCNC: 95 MMOL/L — SIGNIFICANT CHANGE UP
SODIUM UR-SCNC: <20 MMOL/L — SIGNIFICANT CHANGE UP
SP GR SPEC: 1.01 — SIGNIFICANT CHANGE UP (ref 1–1.03)
SP GR SPEC: 1.02 — SIGNIFICANT CHANGE UP (ref 1–1.03)
SP GR SPEC: >=1.03 — SIGNIFICANT CHANGE UP (ref 1–1.03)
SPECIFIC GRAVITY URINE: 1.03
SPECIMEN SOURCE: SIGNIFICANT CHANGE UP
SPHEROCYTES BLD QL SMEAR: SLIGHT — SIGNIFICANT CHANGE UP
SQUAMOUS EPITHELIAL CELLS: 1 /HPF
T4 FREE SERPL-MCNC: 0.75 NG/DL — LOW (ref 0.93–1.7)
T4 FREE SERPL-MCNC: 0.9 NG/DL
T4 FREE SERPL-MCNC: 1 NG/DL
TARGETS BLD QL SMEAR: SLIGHT — SIGNIFICANT CHANGE UP
TARGETS BLD QL SMEAR: SLIGHT — SIGNIFICANT CHANGE UP
THC UR QL: NEGATIVE — SIGNIFICANT CHANGE UP
THC UR QL: NEGATIVE — SIGNIFICANT CHANGE UP
TIBC SERPL-MCNC: 200 UG/DL — LOW (ref 220–430)
TIBC SERPL-MCNC: 211 UG/DL — LOW (ref 220–430)
TIBC SERPL-MCNC: 321 UG/DL
TIBC SERPL-MCNC: 331 UG/DL — SIGNIFICANT CHANGE UP (ref 220–430)
TOBRAMYCIN TROUGH SERPL-MCNC: 1.4 UG/ML — SIGNIFICANT CHANGE UP (ref 0–2)
TRANSFERRIN SERPL-MCNC: 171 MG/DL — LOW (ref 200–360)
TRANSFERRIN SERPL-MCNC: 251 MG/DL
TRANSFERRIN SERPL-MCNC: 293 MG/DL — SIGNIFICANT CHANGE UP (ref 200–360)
TRIGL SERPL-MCNC: 137 MG/DL — SIGNIFICANT CHANGE UP
TRIGL SERPL-MCNC: 175 MG/DL — HIGH
TRIGL SERPL-MCNC: 38 MG/DL
TRIGL SERPL-MCNC: 49 MG/DL — SIGNIFICANT CHANGE UP
TRIGL SERPL-MCNC: 90 MG/DL
TROPONIN I, HIGH SENSITIVITY RESULT: 10.5 NG/L — SIGNIFICANT CHANGE UP
TROPONIN T SERPL-MCNC: 0.02 NG/ML — HIGH (ref 0–0.01)
TROPONIN T SERPL-MCNC: 0.03 NG/ML — HIGH (ref 0–0.01)
TROPONIN T SERPL-MCNC: 0.13 NG/ML — CRITICAL HIGH (ref 0–0.01)
TROPONIN T SERPL-MCNC: 0.16 NG/ML — CRITICAL HIGH (ref 0–0.01)
TROPONIN T SERPL-MCNC: 0.18 NG/ML — CRITICAL HIGH (ref 0–0.01)
TROPONIN T SERPL-MCNC: 0.32 NG/ML — CRITICAL HIGH (ref 0–0.01)
TROPONIN T, HIGH SENSITIVITY RESULT: 18 NG/L — SIGNIFICANT CHANGE UP (ref 0–51)
TROPONIN T, HIGH SENSITIVITY RESULT: 23 NG/L — SIGNIFICANT CHANGE UP (ref 0–51)
TROPONIN T, HIGH SENSITIVITY RESULT: 26 NG/L — SIGNIFICANT CHANGE UP (ref 0–51)
TROPONIN T, HIGH SENSITIVITY RESULT: 41 NG/L — SIGNIFICANT CHANGE UP (ref 0–51)
TROPONIN T, HIGH SENSITIVITY RESULT: 7 NG/L — SIGNIFICANT CHANGE UP (ref 0–51)
TROPONIN T, HIGH SENSITIVITY RESULT: <6 NG/L — SIGNIFICANT CHANGE UP (ref 0–51)
TSH SERPL-ACNC: 2.74 UIU/ML
TSH SERPL-ACNC: 4.45 UIU/ML
TSH SERPL-MCNC: 1.05 UIU/ML — SIGNIFICANT CHANGE UP (ref 0.27–4.2)
TSH SERPL-MCNC: 1.21 UIU/ML — SIGNIFICANT CHANGE UP (ref 0.27–4.2)
UIBC SERPL-MCNC: 187 UG/DL — SIGNIFICANT CHANGE UP (ref 110–370)
UIBC SERPL-MCNC: 193 UG/DL — SIGNIFICANT CHANGE UP (ref 110–370)
UIBC SERPL-MCNC: 221 UG/DL — SIGNIFICANT CHANGE UP (ref 110–370)
UIBC SERPL-MCNC: 238 UG/DL
URATE SERPL-MCNC: 1.9 MG/DL — LOW (ref 3.4–8.8)
UROBILINOGEN FLD QL: 0.2 E.U./DL — SIGNIFICANT CHANGE UP
UROBILINOGEN FLD QL: 1 E.U./DL — SIGNIFICANT CHANGE UP
UROBILINOGEN FLD QL: 4 E.U./DL
UROBILINOGEN FLD QL: 4 E.U./DL
UROBILINOGEN URINE: ABNORMAL
VANCOMYCIN TROUGH SERPL-MCNC: 5.9 UG/ML — LOW (ref 10–20)
VARIANT LYMPHS # BLD: 0.9 % — SIGNIFICANT CHANGE UP (ref 0–6)
VARIANT LYMPHS # BLD: 1.7 % — SIGNIFICANT CHANGE UP (ref 0–6)
VIT B12 SERPL-MCNC: 1469 PG/ML — HIGH (ref 232–1245)
VIT B12 SERPL-MCNC: 373 PG/ML — SIGNIFICANT CHANGE UP (ref 232–1245)
VIT B12 SERPL-MCNC: 438 PG/ML
VIT B12 SERPL-MCNC: 463 PG/ML — SIGNIFICANT CHANGE UP (ref 232–1245)
VIT B12 SERPL-MCNC: 602 PG/ML — SIGNIFICANT CHANGE UP (ref 232–1245)
WBC # BLD: 10.49 K/UL — SIGNIFICANT CHANGE UP (ref 3.8–10.5)
WBC # BLD: 10.76 K/UL — HIGH (ref 3.8–10.5)
WBC # BLD: 10.78 K/UL — HIGH (ref 3.8–10.5)
WBC # BLD: 11.07 K/UL — HIGH (ref 3.8–10.5)
WBC # BLD: 11.09 K/UL — HIGH (ref 3.8–10.5)
WBC # BLD: 11.91 K/UL — HIGH (ref 3.8–10.5)
WBC # BLD: 12.16 K/UL — HIGH (ref 3.8–10.5)
WBC # BLD: 12.69 K/UL — HIGH (ref 3.8–10.5)
WBC # BLD: 14.35 K/UL — HIGH (ref 3.8–10.5)
WBC # BLD: 15.97 K/UL — HIGH (ref 3.8–10.5)
WBC # BLD: 16.13 K/UL — HIGH (ref 3.8–10.5)
WBC # BLD: 2.24 K/UL — LOW (ref 3.8–10.5)
WBC # BLD: 2.28 K/UL — LOW (ref 3.8–10.5)
WBC # BLD: 2.32 K/UL — LOW (ref 3.8–10.5)
WBC # BLD: 2.4 K/UL — LOW (ref 3.8–10.5)
WBC # BLD: 2.42 K/UL — LOW (ref 3.8–10.5)
WBC # BLD: 2.51 K/UL — LOW (ref 3.8–10.5)
WBC # BLD: 2.58 K/UL — LOW (ref 3.8–10.5)
WBC # BLD: 2.65 K/UL — LOW (ref 3.8–10.5)
WBC # BLD: 2.8 K/UL — LOW (ref 3.8–10.5)
WBC # BLD: 2.92 K/UL — LOW (ref 3.8–10.5)
WBC # BLD: 23.43 K/UL — HIGH (ref 3.8–10.5)
WBC # BLD: 28.59 K/UL — HIGH (ref 3.8–10.5)
WBC # BLD: 3.09 K/UL — LOW (ref 3.8–10.5)
WBC # BLD: 3.11 K/UL — LOW (ref 3.8–10.5)
WBC # BLD: 3.18 K/UL — LOW (ref 3.8–10.5)
WBC # BLD: 3.2 K/UL — LOW (ref 3.8–10.5)
WBC # BLD: 3.25 K/UL — LOW (ref 3.8–10.5)
WBC # BLD: 3.29 K/UL — LOW (ref 3.8–10.5)
WBC # BLD: 3.33 K/UL — LOW (ref 3.8–10.5)
WBC # BLD: 3.34 K/UL — LOW (ref 3.8–10.5)
WBC # BLD: 3.43 K/UL — LOW (ref 3.8–10.5)
WBC # BLD: 3.56 K/UL — LOW (ref 3.8–10.5)
WBC # BLD: 3.58 K/UL — LOW (ref 3.8–10.5)
WBC # BLD: 3.62 K/UL — LOW (ref 3.8–10.5)
WBC # BLD: 3.67 K/UL — LOW (ref 3.8–10.5)
WBC # BLD: 3.69 K/UL — LOW (ref 3.8–10.5)
WBC # BLD: 3.71 K/UL — LOW (ref 3.8–10.5)
WBC # BLD: 3.77 K/UL — LOW (ref 3.8–10.5)
WBC # BLD: 3.94 K/UL — SIGNIFICANT CHANGE UP (ref 3.8–10.5)
WBC # BLD: 3.96 K/UL — SIGNIFICANT CHANGE UP (ref 3.8–10.5)
WBC # BLD: 4.27 K/UL — SIGNIFICANT CHANGE UP (ref 3.8–10.5)
WBC # BLD: 4.35 K/UL — SIGNIFICANT CHANGE UP (ref 3.8–10.5)
WBC # BLD: 4.38 K/UL — SIGNIFICANT CHANGE UP (ref 3.8–10.5)
WBC # BLD: 4.38 K/UL — SIGNIFICANT CHANGE UP (ref 3.8–10.5)
WBC # BLD: 4.45 K/UL — SIGNIFICANT CHANGE UP (ref 3.8–10.5)
WBC # BLD: 4.63 K/UL — SIGNIFICANT CHANGE UP (ref 3.8–10.5)
WBC # BLD: 4.64 K/UL — SIGNIFICANT CHANGE UP (ref 3.8–10.5)
WBC # BLD: 4.75 K/UL — SIGNIFICANT CHANGE UP (ref 3.8–10.5)
WBC # BLD: 4.87 K/UL — SIGNIFICANT CHANGE UP (ref 3.8–10.5)
WBC # BLD: 5.02 K/UL — SIGNIFICANT CHANGE UP (ref 3.8–10.5)
WBC # BLD: 5.16 K/UL — SIGNIFICANT CHANGE UP (ref 3.8–10.5)
WBC # BLD: 5.49 K/UL — SIGNIFICANT CHANGE UP (ref 3.8–10.5)
WBC # BLD: 5.51 K/UL — SIGNIFICANT CHANGE UP (ref 3.8–10.5)
WBC # BLD: 5.65 K/UL — SIGNIFICANT CHANGE UP (ref 3.8–10.5)
WBC # BLD: 5.67 K/UL — SIGNIFICANT CHANGE UP (ref 3.8–10.5)
WBC # BLD: 5.91 K/UL — SIGNIFICANT CHANGE UP (ref 3.8–10.5)
WBC # BLD: 5.98 K/UL — SIGNIFICANT CHANGE UP (ref 3.8–10.5)
WBC # BLD: 6.01 K/UL — SIGNIFICANT CHANGE UP (ref 3.8–10.5)
WBC # BLD: 6.26 K/UL — SIGNIFICANT CHANGE UP (ref 3.8–10.5)
WBC # BLD: 6.32 K/UL — SIGNIFICANT CHANGE UP (ref 3.8–10.5)
WBC # BLD: 6.47 K/UL — SIGNIFICANT CHANGE UP (ref 3.8–10.5)
WBC # BLD: 6.48 K/UL — SIGNIFICANT CHANGE UP (ref 3.8–10.5)
WBC # BLD: 6.5 K/UL — SIGNIFICANT CHANGE UP (ref 3.8–10.5)
WBC # BLD: 6.53 K/UL — SIGNIFICANT CHANGE UP (ref 3.8–10.5)
WBC # BLD: 6.65 K/UL — SIGNIFICANT CHANGE UP (ref 3.8–10.5)
WBC # BLD: 6.8 K/UL — SIGNIFICANT CHANGE UP (ref 3.8–10.5)
WBC # BLD: 6.86 K/UL — SIGNIFICANT CHANGE UP (ref 3.8–10.5)
WBC # BLD: 6.87 K/UL — SIGNIFICANT CHANGE UP (ref 3.8–10.5)
WBC # BLD: 6.98 K/UL — SIGNIFICANT CHANGE UP (ref 3.8–10.5)
WBC # BLD: 7.09 K/UL — SIGNIFICANT CHANGE UP (ref 3.8–10.5)
WBC # BLD: 7.1 K/UL — SIGNIFICANT CHANGE UP (ref 3.8–10.5)
WBC # BLD: 7.16 K/UL — SIGNIFICANT CHANGE UP (ref 3.8–10.5)
WBC # BLD: 7.21 K/UL — SIGNIFICANT CHANGE UP (ref 3.8–10.5)
WBC # BLD: 7.4 K/UL — SIGNIFICANT CHANGE UP (ref 3.8–10.5)
WBC # BLD: 7.5 K/UL — SIGNIFICANT CHANGE UP (ref 3.8–10.5)
WBC # BLD: 7.51 K/UL — SIGNIFICANT CHANGE UP (ref 3.8–10.5)
WBC # BLD: 7.55 K/UL — SIGNIFICANT CHANGE UP (ref 3.8–10.5)
WBC # BLD: 7.59 K/UL — SIGNIFICANT CHANGE UP (ref 3.8–10.5)
WBC # BLD: 8.52 K/UL — SIGNIFICANT CHANGE UP (ref 3.8–10.5)
WBC # BLD: 9.99 K/UL — SIGNIFICANT CHANGE UP (ref 3.8–10.5)
WBC # FLD AUTO: 10.49 K/UL — SIGNIFICANT CHANGE UP (ref 3.8–10.5)
WBC # FLD AUTO: 10.76 K/UL — HIGH (ref 3.8–10.5)
WBC # FLD AUTO: 10.78 K/UL — HIGH (ref 3.8–10.5)
WBC # FLD AUTO: 11.07 K/UL — HIGH (ref 3.8–10.5)
WBC # FLD AUTO: 11.09 K/UL — HIGH (ref 3.8–10.5)
WBC # FLD AUTO: 11.34 K/UL
WBC # FLD AUTO: 11.91 K/UL — HIGH (ref 3.8–10.5)
WBC # FLD AUTO: 12.16 K/UL — HIGH (ref 3.8–10.5)
WBC # FLD AUTO: 12.69 K/UL — HIGH (ref 3.8–10.5)
WBC # FLD AUTO: 14.35 K/UL — HIGH (ref 3.8–10.5)
WBC # FLD AUTO: 15.97 K/UL — HIGH (ref 3.8–10.5)
WBC # FLD AUTO: 16.13 K/UL — HIGH (ref 3.8–10.5)
WBC # FLD AUTO: 2.24 K/UL — LOW (ref 3.8–10.5)
WBC # FLD AUTO: 2.28 K/UL — LOW (ref 3.8–10.5)
WBC # FLD AUTO: 2.32 K/UL — LOW (ref 3.8–10.5)
WBC # FLD AUTO: 2.4 K/UL — LOW (ref 3.8–10.5)
WBC # FLD AUTO: 2.42 K/UL — LOW (ref 3.8–10.5)
WBC # FLD AUTO: 2.51 K/UL — LOW (ref 3.8–10.5)
WBC # FLD AUTO: 2.58 K/UL — LOW (ref 3.8–10.5)
WBC # FLD AUTO: 2.65 K/UL — LOW (ref 3.8–10.5)
WBC # FLD AUTO: 2.8 K/UL — LOW (ref 3.8–10.5)
WBC # FLD AUTO: 2.92 K/UL — LOW (ref 3.8–10.5)
WBC # FLD AUTO: 23.43 K/UL — HIGH (ref 3.8–10.5)
WBC # FLD AUTO: 28.59 K/UL — HIGH (ref 3.8–10.5)
WBC # FLD AUTO: 3.09 K/UL — LOW (ref 3.8–10.5)
WBC # FLD AUTO: 3.11 K/UL — LOW (ref 3.8–10.5)
WBC # FLD AUTO: 3.18 K/UL — LOW (ref 3.8–10.5)
WBC # FLD AUTO: 3.2 K/UL — LOW (ref 3.8–10.5)
WBC # FLD AUTO: 3.25 K/UL — LOW (ref 3.8–10.5)
WBC # FLD AUTO: 3.29 K/UL — LOW (ref 3.8–10.5)
WBC # FLD AUTO: 3.33 K/UL — LOW (ref 3.8–10.5)
WBC # FLD AUTO: 3.34 K/UL — LOW (ref 3.8–10.5)
WBC # FLD AUTO: 3.43 K/UL — LOW (ref 3.8–10.5)
WBC # FLD AUTO: 3.56 K/UL — LOW (ref 3.8–10.5)
WBC # FLD AUTO: 3.58 K/UL — LOW (ref 3.8–10.5)
WBC # FLD AUTO: 3.62 K/UL — LOW (ref 3.8–10.5)
WBC # FLD AUTO: 3.67 K/UL — LOW (ref 3.8–10.5)
WBC # FLD AUTO: 3.69 K/UL — LOW (ref 3.8–10.5)
WBC # FLD AUTO: 3.71 K/UL — LOW (ref 3.8–10.5)
WBC # FLD AUTO: 3.77 K/UL — LOW (ref 3.8–10.5)
WBC # FLD AUTO: 3.94 K/UL — SIGNIFICANT CHANGE UP (ref 3.8–10.5)
WBC # FLD AUTO: 3.96 K/UL — SIGNIFICANT CHANGE UP (ref 3.8–10.5)
WBC # FLD AUTO: 4.27 K/UL — SIGNIFICANT CHANGE UP (ref 3.8–10.5)
WBC # FLD AUTO: 4.35 K/UL — SIGNIFICANT CHANGE UP (ref 3.8–10.5)
WBC # FLD AUTO: 4.38 K/UL — SIGNIFICANT CHANGE UP (ref 3.8–10.5)
WBC # FLD AUTO: 4.38 K/UL — SIGNIFICANT CHANGE UP (ref 3.8–10.5)
WBC # FLD AUTO: 4.39 K/UL
WBC # FLD AUTO: 4.45 K/UL — SIGNIFICANT CHANGE UP (ref 3.8–10.5)
WBC # FLD AUTO: 4.63 K/UL — SIGNIFICANT CHANGE UP (ref 3.8–10.5)
WBC # FLD AUTO: 4.64 K/UL — SIGNIFICANT CHANGE UP (ref 3.8–10.5)
WBC # FLD AUTO: 4.75 K/UL — SIGNIFICANT CHANGE UP (ref 3.8–10.5)
WBC # FLD AUTO: 4.76 K/UL
WBC # FLD AUTO: 4.76 K/UL
WBC # FLD AUTO: 4.87 K/UL — SIGNIFICANT CHANGE UP (ref 3.8–10.5)
WBC # FLD AUTO: 5.02 K/UL — SIGNIFICANT CHANGE UP (ref 3.8–10.5)
WBC # FLD AUTO: 5.16 K/UL — SIGNIFICANT CHANGE UP (ref 3.8–10.5)
WBC # FLD AUTO: 5.31 K/UL
WBC # FLD AUTO: 5.49 K/UL — SIGNIFICANT CHANGE UP (ref 3.8–10.5)
WBC # FLD AUTO: 5.51 K/UL — SIGNIFICANT CHANGE UP (ref 3.8–10.5)
WBC # FLD AUTO: 5.65 K/UL — SIGNIFICANT CHANGE UP (ref 3.8–10.5)
WBC # FLD AUTO: 5.67 K/UL — SIGNIFICANT CHANGE UP (ref 3.8–10.5)
WBC # FLD AUTO: 5.89 K/UL
WBC # FLD AUTO: 5.91 K/UL — SIGNIFICANT CHANGE UP (ref 3.8–10.5)
WBC # FLD AUTO: 5.98 K/UL — SIGNIFICANT CHANGE UP (ref 3.8–10.5)
WBC # FLD AUTO: 6.01 K/UL — SIGNIFICANT CHANGE UP (ref 3.8–10.5)
WBC # FLD AUTO: 6.26 K/UL — SIGNIFICANT CHANGE UP (ref 3.8–10.5)
WBC # FLD AUTO: 6.32 K/UL — SIGNIFICANT CHANGE UP (ref 3.8–10.5)
WBC # FLD AUTO: 6.47 K/UL — SIGNIFICANT CHANGE UP (ref 3.8–10.5)
WBC # FLD AUTO: 6.48 K/UL — SIGNIFICANT CHANGE UP (ref 3.8–10.5)
WBC # FLD AUTO: 6.5 K/UL — SIGNIFICANT CHANGE UP (ref 3.8–10.5)
WBC # FLD AUTO: 6.53 K/UL — SIGNIFICANT CHANGE UP (ref 3.8–10.5)
WBC # FLD AUTO: 6.65 K/UL — SIGNIFICANT CHANGE UP (ref 3.8–10.5)
WBC # FLD AUTO: 6.8 K/UL — SIGNIFICANT CHANGE UP (ref 3.8–10.5)
WBC # FLD AUTO: 6.86 K/UL — SIGNIFICANT CHANGE UP (ref 3.8–10.5)
WBC # FLD AUTO: 6.87 K/UL — SIGNIFICANT CHANGE UP (ref 3.8–10.5)
WBC # FLD AUTO: 6.98 K/UL — SIGNIFICANT CHANGE UP (ref 3.8–10.5)
WBC # FLD AUTO: 7.09 K/UL — SIGNIFICANT CHANGE UP (ref 3.8–10.5)
WBC # FLD AUTO: 7.1 K/UL — SIGNIFICANT CHANGE UP (ref 3.8–10.5)
WBC # FLD AUTO: 7.16 K/UL — SIGNIFICANT CHANGE UP (ref 3.8–10.5)
WBC # FLD AUTO: 7.21 K/UL — SIGNIFICANT CHANGE UP (ref 3.8–10.5)
WBC # FLD AUTO: 7.4 K/UL — SIGNIFICANT CHANGE UP (ref 3.8–10.5)
WBC # FLD AUTO: 7.5 K/UL — SIGNIFICANT CHANGE UP (ref 3.8–10.5)
WBC # FLD AUTO: 7.51 K/UL — SIGNIFICANT CHANGE UP (ref 3.8–10.5)
WBC # FLD AUTO: 7.55 K/UL — SIGNIFICANT CHANGE UP (ref 3.8–10.5)
WBC # FLD AUTO: 7.59 K/UL — SIGNIFICANT CHANGE UP (ref 3.8–10.5)
WBC # FLD AUTO: 8.52 K/UL — SIGNIFICANT CHANGE UP (ref 3.8–10.5)
WBC # FLD AUTO: 9.99 K/UL — SIGNIFICANT CHANGE UP (ref 3.8–10.5)
WBC UR QL: < 5 /HPF — SIGNIFICANT CHANGE UP
WHITE BLOOD CELLS URINE: 3 /HPF

## 2023-01-01 PROCEDURE — 71045 X-RAY EXAM CHEST 1 VIEW: CPT | Mod: 26,76

## 2023-01-01 PROCEDURE — 76604 US EXAM CHEST: CPT | Mod: 26

## 2023-01-01 PROCEDURE — G0009: CPT

## 2023-01-01 PROCEDURE — 99233 SBSQ HOSP IP/OBS HIGH 50: CPT

## 2023-01-01 PROCEDURE — 71045 X-RAY EXAM CHEST 1 VIEW: CPT | Mod: 26

## 2023-01-01 PROCEDURE — 70450 CT HEAD/BRAIN W/O DYE: CPT | Mod: 26,59,77

## 2023-01-01 PROCEDURE — 99291 CRITICAL CARE FIRST HOUR: CPT

## 2023-01-01 PROCEDURE — 99232 SBSQ HOSP IP/OBS MODERATE 35: CPT

## 2023-01-01 PROCEDURE — 71045 X-RAY EXAM CHEST 1 VIEW: CPT | Mod: 26,77

## 2023-01-01 PROCEDURE — 99232 SBSQ HOSP IP/OBS MODERATE 35: CPT | Mod: GC

## 2023-01-01 PROCEDURE — 97116 GAIT TRAINING THERAPY: CPT

## 2023-01-01 PROCEDURE — 74018 RADEX ABDOMEN 1 VIEW: CPT | Mod: 26

## 2023-01-01 PROCEDURE — 99292 CRITICAL CARE ADDL 30 MIN: CPT | Mod: 25

## 2023-01-01 PROCEDURE — 83036 HEMOGLOBIN GLYCOSYLATED A1C: CPT | Mod: QW

## 2023-01-01 PROCEDURE — 93306 TTE W/DOPPLER COMPLETE: CPT | Mod: 26

## 2023-01-01 PROCEDURE — 82550 ASSAY OF CK (CPK): CPT

## 2023-01-01 PROCEDURE — 95708 EEG WO VID EA 12-26HR UNMNTR: CPT

## 2023-01-01 PROCEDURE — 99214 OFFICE O/P EST MOD 30 MIN: CPT

## 2023-01-01 PROCEDURE — 99214 OFFICE O/P EST MOD 30 MIN: CPT | Mod: 25

## 2023-01-01 PROCEDURE — 97163 PT EVAL HIGH COMPLEX 45 MIN: CPT

## 2023-01-01 PROCEDURE — 36415 COLL VENOUS BLD VENIPUNCTURE: CPT

## 2023-01-01 PROCEDURE — 70491 CT SOFT TISSUE NECK W/DYE: CPT

## 2023-01-01 PROCEDURE — 97530 THERAPEUTIC ACTIVITIES: CPT

## 2023-01-01 PROCEDURE — 70496 CT ANGIOGRAPHY HEAD: CPT | Mod: 26

## 2023-01-01 PROCEDURE — 36556 INSERT NON-TUNNEL CV CATH: CPT

## 2023-01-01 PROCEDURE — 84145 PROCALCITONIN (PCT): CPT

## 2023-01-01 PROCEDURE — 82728 ASSAY OF FERRITIN: CPT

## 2023-01-01 PROCEDURE — C1760: CPT

## 2023-01-01 PROCEDURE — 80048 BASIC METABOLIC PNL TOTAL CA: CPT

## 2023-01-01 PROCEDURE — A9585: CPT

## 2023-01-01 PROCEDURE — 83930 ASSAY OF BLOOD OSMOLALITY: CPT

## 2023-01-01 PROCEDURE — U0003: CPT

## 2023-01-01 PROCEDURE — 70450 CT HEAD/BRAIN W/O DYE: CPT | Mod: MD

## 2023-01-01 PROCEDURE — 93970 EXTREMITY STUDY: CPT

## 2023-01-01 PROCEDURE — C8929: CPT

## 2023-01-01 PROCEDURE — 99222 1ST HOSP IP/OBS MODERATE 55: CPT

## 2023-01-01 PROCEDURE — C1894: CPT

## 2023-01-01 PROCEDURE — 70491 CT SOFT TISSUE NECK W/DYE: CPT | Mod: 26

## 2023-01-01 PROCEDURE — 83550 IRON BINDING TEST: CPT

## 2023-01-01 PROCEDURE — 75574 CT ANGIO HRT W/3D IMAGE: CPT | Mod: 26

## 2023-01-01 PROCEDURE — C1887: CPT

## 2023-01-01 PROCEDURE — 99223 1ST HOSP IP/OBS HIGH 75: CPT

## 2023-01-01 PROCEDURE — 99498 ADVNCD CARE PLAN ADDL 30 MIN: CPT | Mod: 25

## 2023-01-01 PROCEDURE — 71260 CT THORAX DX C+: CPT

## 2023-01-01 PROCEDURE — 97110 THERAPEUTIC EXERCISES: CPT

## 2023-01-01 PROCEDURE — 75574 CT ANGIO HRT W/3D IMAGE: CPT

## 2023-01-01 PROCEDURE — 0042T: CPT | Mod: MA

## 2023-01-01 PROCEDURE — 99497 ADVNCD CARE PLAN 30 MIN: CPT | Mod: 25

## 2023-01-01 PROCEDURE — 93321 DOPPLER ECHO F-UP/LMTD STD: CPT | Mod: 26

## 2023-01-01 PROCEDURE — 17250 CHEM CAUT OF GRANLTJ TISSUE: CPT

## 2023-01-01 PROCEDURE — 99291 CRITICAL CARE FIRST HOUR: CPT | Mod: 25

## 2023-01-01 PROCEDURE — U0005: CPT

## 2023-01-01 PROCEDURE — 70553 MRI BRAIN STEM W/O & W/DYE: CPT

## 2023-01-01 PROCEDURE — 93000 ELECTROCARDIOGRAM COMPLETE: CPT

## 2023-01-01 PROCEDURE — ZZZZZ: CPT

## 2023-01-01 PROCEDURE — 84300 ASSAY OF URINE SODIUM: CPT

## 2023-01-01 PROCEDURE — 31502 CHANGE OF WINDPIPE AIRWAY: CPT

## 2023-01-01 PROCEDURE — 99215 OFFICE O/P EST HI 40 MIN: CPT

## 2023-01-01 PROCEDURE — C1769: CPT

## 2023-01-01 PROCEDURE — 85027 COMPLETE CBC AUTOMATED: CPT

## 2023-01-01 PROCEDURE — 85730 THROMBOPLASTIN TIME PARTIAL: CPT

## 2023-01-01 PROCEDURE — 82803 BLOOD GASES ANY COMBINATION: CPT

## 2023-01-01 PROCEDURE — 99221 1ST HOSP IP/OBS SF/LOW 40: CPT

## 2023-01-01 PROCEDURE — 70496 CT ANGIOGRAPHY HEAD: CPT | Mod: 26,MA

## 2023-01-01 PROCEDURE — 82962 GLUCOSE BLOOD TEST: CPT

## 2023-01-01 PROCEDURE — 99203 OFFICE O/P NEW LOW 30 MIN: CPT | Mod: 95

## 2023-01-01 PROCEDURE — 99233 SBSQ HOSP IP/OBS HIGH 50: CPT | Mod: 25

## 2023-01-01 PROCEDURE — 70498 CT ANGIOGRAPHY NECK: CPT | Mod: 26,MA

## 2023-01-01 PROCEDURE — 84100 ASSAY OF PHOSPHORUS: CPT

## 2023-01-01 PROCEDURE — 93308 TTE F-UP OR LMTD: CPT

## 2023-01-01 PROCEDURE — 71045 X-RAY EXAM CHEST 1 VIEW: CPT

## 2023-01-01 PROCEDURE — 71260 CT THORAX DX C+: CPT | Mod: 26

## 2023-01-01 PROCEDURE — 84484 ASSAY OF TROPONIN QUANT: CPT

## 2023-01-01 PROCEDURE — 94002 VENT MGMT INPAT INIT DAY: CPT

## 2023-01-01 PROCEDURE — 93970 EXTREMITY STUDY: CPT | Mod: 26

## 2023-01-01 PROCEDURE — 95720 EEG PHY/QHP EA INCR W/VEEG: CPT

## 2023-01-01 PROCEDURE — 97112 NEUROMUSCULAR REEDUCATION: CPT

## 2023-01-01 PROCEDURE — 93306 TTE W/DOPPLER COMPLETE: CPT

## 2023-01-01 PROCEDURE — 83615 LACTATE (LD) (LDH) ENZYME: CPT

## 2023-01-01 PROCEDURE — 93971 EXTREMITY STUDY: CPT | Mod: 26,LT

## 2023-01-01 PROCEDURE — 0042T: CPT

## 2023-01-01 PROCEDURE — 31600 PLANNED TRACHEOSTOMY: CPT

## 2023-01-01 PROCEDURE — 36010 PLACE CATHETER IN VEIN: CPT

## 2023-01-01 PROCEDURE — 97165 OT EVAL LOW COMPLEX 30 MIN: CPT

## 2023-01-01 PROCEDURE — 99204 OFFICE O/P NEW MOD 45 MIN: CPT

## 2023-01-01 PROCEDURE — 70450 CT HEAD/BRAIN W/O DYE: CPT | Mod: 26,59,MA

## 2023-01-01 PROCEDURE — 94003 VENT MGMT INPAT SUBQ DAY: CPT

## 2023-01-01 PROCEDURE — 99231 SBSQ HOSP IP/OBS SF/LOW 25: CPT

## 2023-01-01 PROCEDURE — 99222 1ST HOSP IP/OBS MODERATE 55: CPT | Mod: 25

## 2023-01-01 PROCEDURE — 70553 MRI BRAIN STEM W/O & W/DYE: CPT | Mod: 26

## 2023-01-01 PROCEDURE — 82607 VITAMIN B-12: CPT

## 2023-01-01 PROCEDURE — 80053 COMPREHEN METABOLIC PANEL: CPT

## 2023-01-01 PROCEDURE — 84295 ASSAY OF SERUM SODIUM: CPT

## 2023-01-01 PROCEDURE — 87641 MR-STAPH DNA AMP PROBE: CPT

## 2023-01-01 PROCEDURE — P9045: CPT

## 2023-01-01 PROCEDURE — 85379 FIBRIN DEGRADATION QUANT: CPT

## 2023-01-01 PROCEDURE — 99239 HOSP IP/OBS DSCHRG MGMT >30: CPT

## 2023-01-01 PROCEDURE — 99222 1ST HOSP IP/OBS MODERATE 55: CPT | Mod: GC

## 2023-01-01 PROCEDURE — 95700 EEG CONT REC W/VID EEG TECH: CPT

## 2023-01-01 PROCEDURE — 71275 CT ANGIOGRAPHY CHEST: CPT | Mod: 26

## 2023-01-01 PROCEDURE — 70450 CT HEAD/BRAIN W/O DYE: CPT | Mod: 26,59

## 2023-01-01 PROCEDURE — 85025 COMPLETE CBC W/AUTO DIFF WBC: CPT

## 2023-01-01 PROCEDURE — 99496 TRANSJ CARE MGMT HIGH F2F 7D: CPT | Mod: 25

## 2023-01-01 PROCEDURE — 97162 PT EVAL MOD COMPLEX 30 MIN: CPT

## 2023-01-01 PROCEDURE — 83735 ASSAY OF MAGNESIUM: CPT

## 2023-01-01 PROCEDURE — 87040 BLOOD CULTURE FOR BACTERIA: CPT

## 2023-01-01 PROCEDURE — 82140 ASSAY OF AMMONIA: CPT

## 2023-01-01 PROCEDURE — 83880 ASSAY OF NATRIURETIC PEPTIDE: CPT

## 2023-01-01 PROCEDURE — 99204 OFFICE O/P NEW MOD 45 MIN: CPT | Mod: 25

## 2023-01-01 PROCEDURE — 87186 SC STD MICRODIL/AGAR DIL: CPT

## 2023-01-01 PROCEDURE — 85045 AUTOMATED RETICULOCYTE COUNT: CPT

## 2023-01-01 PROCEDURE — 70450 CT HEAD/BRAIN W/O DYE: CPT | Mod: 26

## 2023-01-01 PROCEDURE — 83036 HEMOGLOBIN GLYCOSYLATED A1C: CPT

## 2023-01-01 PROCEDURE — 92610 EVALUATE SWALLOWING FUNCTION: CPT

## 2023-01-01 PROCEDURE — 31615 TRCHEOBRNCHSC EST TRACHS INC: CPT

## 2023-01-01 PROCEDURE — 97167 OT EVAL HIGH COMPLEX 60 MIN: CPT

## 2023-01-01 PROCEDURE — 93010 ELECTROCARDIOGRAM REPORT: CPT

## 2023-01-01 PROCEDURE — 84466 ASSAY OF TRANSFERRIN: CPT

## 2023-01-01 PROCEDURE — 70496 CT ANGIOGRAPHY HEAD: CPT

## 2023-01-01 PROCEDURE — 99223 1ST HOSP IP/OBS HIGH 75: CPT | Mod: GC

## 2023-01-01 PROCEDURE — 84443 ASSAY THYROID STIM HORMONE: CPT

## 2023-01-01 PROCEDURE — 93454 CORONARY ARTERY ANGIO S&I: CPT | Mod: 26,59

## 2023-01-01 PROCEDURE — 31575 DIAGNOSTIC LARYNGOSCOPY: CPT

## 2023-01-01 PROCEDURE — 31622 DX BRONCHOSCOPE/WASH: CPT

## 2023-01-01 PROCEDURE — 51703 INSERT BLADDER CATH COMPLEX: CPT

## 2023-01-01 PROCEDURE — 94640 AIRWAY INHALATION TREATMENT: CPT

## 2023-01-01 PROCEDURE — 82746 ASSAY OF FOLIC ACID SERUM: CPT

## 2023-01-01 PROCEDURE — 83605 ASSAY OF LACTIC ACID: CPT

## 2023-01-01 PROCEDURE — 99213 OFFICE O/P EST LOW 20 MIN: CPT | Mod: 25

## 2023-01-01 PROCEDURE — 87635 SARS-COV-2 COVID-19 AMP PRB: CPT

## 2023-01-01 PROCEDURE — 92522 EVALUATE SPEECH PRODUCTION: CPT

## 2023-01-01 PROCEDURE — 99214 OFFICE O/P EST MOD 30 MIN: CPT | Mod: 95

## 2023-01-01 PROCEDURE — 31575 DIAGNOSTIC LARYNGOSCOPY: CPT | Mod: 59

## 2023-01-01 PROCEDURE — 82533 TOTAL CORTISOL: CPT

## 2023-01-01 PROCEDURE — 97535 SELF CARE MNGMENT TRAINING: CPT

## 2023-01-01 PROCEDURE — 87449 NOS EACH ORGANISM AG IA: CPT

## 2023-01-01 PROCEDURE — 92523 SPEECH SOUND LANG COMPREHEN: CPT

## 2023-01-01 PROCEDURE — 93454 CORONARY ARTERY ANGIO S&I: CPT

## 2023-01-01 PROCEDURE — 71046 X-RAY EXAM CHEST 2 VIEWS: CPT

## 2023-01-01 PROCEDURE — 90677 PCV20 VACCINE IM: CPT

## 2023-01-01 PROCEDURE — 95705 EEG W/O VID 2-12 HR UNMNTR: CPT

## 2023-01-01 PROCEDURE — 87640 STAPH A DNA AMP PROBE: CPT

## 2023-01-01 PROCEDURE — 36620 INSERTION CATHETER ARTERY: CPT

## 2023-01-01 PROCEDURE — 85610 PROTHROMBIN TIME: CPT

## 2023-01-01 PROCEDURE — 82553 CREATINE MB FRACTION: CPT

## 2023-01-01 PROCEDURE — 85384 FIBRINOGEN ACTIVITY: CPT

## 2023-01-01 PROCEDURE — 87070 CULTURE OTHR SPECIMN AEROBIC: CPT

## 2023-01-01 PROCEDURE — 36600 WITHDRAWAL OF ARTERIAL BLOOD: CPT | Mod: 59

## 2023-01-01 PROCEDURE — 83935 ASSAY OF URINE OSMOLALITY: CPT

## 2023-01-01 PROCEDURE — 74018 RADEX ABDOMEN 1 VIEW: CPT | Mod: 26,77

## 2023-01-01 PROCEDURE — 70450 CT HEAD/BRAIN W/O DYE: CPT | Mod: 26,XU

## 2023-01-01 PROCEDURE — 85520 HEPARIN ASSAY: CPT

## 2023-01-01 PROCEDURE — 99498 ADVNCD CARE PLAN ADDL 30 MIN: CPT

## 2023-01-01 PROCEDURE — 92507 TX SP LANG VOICE COMM INDIV: CPT

## 2023-01-01 PROCEDURE — 93005 ELECTROCARDIOGRAM TRACING: CPT

## 2023-01-01 PROCEDURE — 81003 URINALYSIS AUTO W/O SCOPE: CPT

## 2023-01-01 PROCEDURE — 99215 OFFICE O/P EST HI 40 MIN: CPT | Mod: 25

## 2023-01-01 PROCEDURE — 82010 KETONE BODYS QUAN: CPT

## 2023-01-01 PROCEDURE — 83540 ASSAY OF IRON: CPT

## 2023-01-01 PROCEDURE — 71045 X-RAY EXAM CHEST 1 VIEW: CPT | Mod: 26,76,77

## 2023-01-01 PROCEDURE — 84132 ASSAY OF SERUM POTASSIUM: CPT

## 2023-01-01 PROCEDURE — 61650 EVASC PRLNG ADMN RX AGNT 1ST: CPT

## 2023-01-01 PROCEDURE — 31500 INSERT EMERGENCY AIRWAY: CPT

## 2023-01-01 PROCEDURE — 82330 ASSAY OF CALCIUM: CPT

## 2023-01-01 PROCEDURE — 80076 HEPATIC FUNCTION PANEL: CPT

## 2023-01-01 PROCEDURE — 43235 EGD DIAGNOSTIC BRUSH WASH: CPT | Mod: GC

## 2023-01-01 PROCEDURE — 82570 ASSAY OF URINE CREATININE: CPT

## 2023-01-01 PROCEDURE — 93308 TTE F-UP OR LMTD: CPT | Mod: 26

## 2023-01-01 PROCEDURE — 74177 CT ABD & PELVIS W/CONTRAST: CPT | Mod: 26

## 2023-01-01 PROCEDURE — 99152 MOD SED SAME PHYS/QHP 5/>YRS: CPT | Mod: 59

## 2023-01-01 PROCEDURE — 81001 URINALYSIS AUTO W/SCOPE: CPT

## 2023-01-01 PROCEDURE — 87324 CLOSTRIDIUM AG IA: CPT

## 2023-01-01 PROCEDURE — 83010 ASSAY OF HAPTOGLOBIN QUANT: CPT

## 2023-01-01 PROCEDURE — 70498 CT ANGIOGRAPHY NECK: CPT | Mod: 26

## 2023-01-01 PROCEDURE — 99024 POSTOP FOLLOW-UP VISIT: CPT

## 2023-01-01 PROCEDURE — 99497 ADVNCD CARE PLAN 30 MIN: CPT

## 2023-01-01 PROCEDURE — 82947 ASSAY GLUCOSE BLOOD QUANT: CPT

## 2023-01-01 DEVICE — TUBE TRACH SZ 6 CUFF FLEX REUSE: Type: IMPLANTABLE DEVICE | Status: FUNCTIONAL

## 2023-01-01 RX ORDER — SODIUM,POTASSIUM PHOSPHATES 278-250MG
1 POWDER IN PACKET (EA) ORAL ONCE
Refills: 0 | Status: COMPLETED | OUTPATIENT
Start: 2023-01-01 | End: 2023-01-01

## 2023-01-01 RX ORDER — POTASSIUM CHLORIDE 20 MEQ
10 PACKET (EA) ORAL
Refills: 0 | Status: COMPLETED | OUTPATIENT
Start: 2023-01-01 | End: 2023-01-01

## 2023-01-01 RX ORDER — SODIUM,POTASSIUM PHOSPHATES 278-250MG
1 POWDER IN PACKET (EA) ORAL EVERY 12 HOURS
Refills: 0 | Status: COMPLETED | OUTPATIENT
Start: 2023-01-01 | End: 2023-01-01

## 2023-01-01 RX ORDER — DEXTROSE 50 % IN WATER 50 %
50 SYRINGE (ML) INTRAVENOUS
Refills: 0 | Status: DISCONTINUED | OUTPATIENT
Start: 2023-01-01 | End: 2023-01-01

## 2023-01-01 RX ORDER — PROPOFOL 10 MG/ML
9.99 INJECTION, EMULSION INTRAVENOUS
Qty: 1000 | Refills: 0 | Status: DISCONTINUED | OUTPATIENT
Start: 2023-01-01 | End: 2023-01-01

## 2023-01-01 RX ORDER — PANTOPRAZOLE SODIUM 20 MG/1
40 TABLET, DELAYED RELEASE ORAL
Refills: 0 | Status: DISCONTINUED | OUTPATIENT
Start: 2023-01-01 | End: 2023-01-01

## 2023-01-01 RX ORDER — IPRATROPIUM/ALBUTEROL SULFATE 18-103MCG
3 AEROSOL WITH ADAPTER (GRAM) INHALATION
Qty: 0 | Refills: 0 | DISCHARGE
Start: 2023-01-01

## 2023-01-01 RX ORDER — ESCITALOPRAM OXALATE 10 MG/1
5 TABLET, FILM COATED ORAL DAILY
Refills: 0 | Status: DISCONTINUED | OUTPATIENT
Start: 2023-01-01 | End: 2023-01-01

## 2023-01-01 RX ORDER — SUCRALFATE 1 G
1 TABLET ORAL EVERY 6 HOURS
Refills: 0 | Status: DISCONTINUED | OUTPATIENT
Start: 2023-01-01 | End: 2023-01-01

## 2023-01-01 RX ORDER — POTASSIUM PHOSPHATE, MONOBASIC POTASSIUM PHOSPHATE, DIBASIC 236; 224 MG/ML; MG/ML
15 INJECTION, SOLUTION INTRAVENOUS ONCE
Refills: 0 | Status: COMPLETED | OUTPATIENT
Start: 2023-01-01 | End: 2023-01-01

## 2023-01-01 RX ORDER — SODIUM CHLORIDE 9 MG/ML
500 INJECTION INTRAMUSCULAR; INTRAVENOUS; SUBCUTANEOUS ONCE
Refills: 0 | Status: COMPLETED | OUTPATIENT
Start: 2023-01-01 | End: 2023-01-01

## 2023-01-01 RX ORDER — SODIUM CHLORIDE 9 MG/ML
4 INJECTION INTRAMUSCULAR; INTRAVENOUS; SUBCUTANEOUS EVERY 6 HOURS
Refills: 0 | Status: DISCONTINUED | OUTPATIENT
Start: 2023-01-01 | End: 2023-01-01

## 2023-01-01 RX ORDER — MAGNESIUM SULFATE 500 MG/ML
2 VIAL (ML) INJECTION ONCE
Refills: 0 | Status: COMPLETED | OUTPATIENT
Start: 2023-01-01 | End: 2023-01-01

## 2023-01-01 RX ORDER — POTASSIUM CHLORIDE 20 MEQ
40 PACKET (EA) ORAL
Refills: 0 | Status: COMPLETED | OUTPATIENT
Start: 2023-01-01 | End: 2023-01-01

## 2023-01-01 RX ORDER — FLUDROCORTISONE ACETATE 0.1 MG/1
0.1 TABLET ORAL DAILY
Refills: 0 | Status: DISCONTINUED | OUTPATIENT
Start: 2023-01-01 | End: 2023-01-01

## 2023-01-01 RX ORDER — ACETAMINOPHEN 500 MG
650 TABLET ORAL EVERY 6 HOURS
Refills: 0 | Status: DISCONTINUED | OUTPATIENT
Start: 2023-01-01 | End: 2023-01-01

## 2023-01-01 RX ORDER — INSULIN GLARGINE 100 [IU]/ML
10 INJECTION, SOLUTION SUBCUTANEOUS AT BEDTIME
Refills: 0 | Status: DISCONTINUED | OUTPATIENT
Start: 2023-01-01 | End: 2023-01-01

## 2023-01-01 RX ORDER — CIPROFLOXACIN LACTATE 400MG/40ML
400 VIAL (ML) INTRAVENOUS EVERY 12 HOURS
Refills: 0 | Status: DISCONTINUED | OUTPATIENT
Start: 2023-01-01 | End: 2023-01-01

## 2023-01-01 RX ORDER — POTASSIUM CHLORIDE 20 MEQ
40 PACKET (EA) ORAL ONCE
Refills: 0 | Status: COMPLETED | OUTPATIENT
Start: 2023-01-01 | End: 2023-01-01

## 2023-01-01 RX ORDER — NOREPINEPHRINE BITARTRATE/D5W 8 MG/250ML
0.05 PLASTIC BAG, INJECTION (ML) INTRAVENOUS
Qty: 8 | Refills: 0 | Status: DISCONTINUED | OUTPATIENT
Start: 2023-01-01 | End: 2023-01-01

## 2023-01-01 RX ORDER — SODIUM CHLORIDE 9 MG/ML
10 INJECTION INTRAMUSCULAR; INTRAVENOUS; SUBCUTANEOUS
Refills: 0 | Status: DISCONTINUED | OUTPATIENT
Start: 2023-01-01 | End: 2023-01-01

## 2023-01-01 RX ORDER — PHYTONADIONE (VIT K1) 5 MG
10 TABLET ORAL DAILY
Refills: 0 | Status: COMPLETED | OUTPATIENT
Start: 2023-01-01 | End: 2023-01-01

## 2023-01-01 RX ORDER — ACETAMINOPHEN 500 MG
1000 TABLET ORAL ONCE
Refills: 0 | Status: COMPLETED | OUTPATIENT
Start: 2023-01-01 | End: 2023-01-01

## 2023-01-01 RX ORDER — DEXMEDETOMIDINE HYDROCHLORIDE IN 0.9% SODIUM CHLORIDE 4 UG/ML
0.2 INJECTION INTRAVENOUS
Qty: 400 | Refills: 0 | Status: DISCONTINUED | OUTPATIENT
Start: 2023-01-01 | End: 2023-01-01

## 2023-01-01 RX ORDER — SODIUM CHLORIDE 9 MG/ML
2 INJECTION INTRAMUSCULAR; INTRAVENOUS; SUBCUTANEOUS EVERY 6 HOURS
Refills: 0 | Status: DISCONTINUED | OUTPATIENT
Start: 2023-01-01 | End: 2023-01-01

## 2023-01-01 RX ORDER — ATORVASTATIN CALCIUM 80 MG/1
20 TABLET, FILM COATED ORAL AT BEDTIME
Refills: 0 | Status: DISCONTINUED | OUTPATIENT
Start: 2023-01-01 | End: 2023-01-01

## 2023-01-01 RX ORDER — FUROSEMIDE 40 MG
10 TABLET ORAL ONCE
Refills: 0 | Status: COMPLETED | OUTPATIENT
Start: 2023-01-01 | End: 2023-01-01

## 2023-01-01 RX ORDER — SITAGLIPTIN 100 MG/1
100 TABLET, FILM COATED ORAL
Qty: 90 | Refills: 1 | Status: ACTIVE | COMMUNITY
Start: 2023-01-01 | End: 1900-01-01

## 2023-01-01 RX ORDER — DEXTROSE 50 % IN WATER 50 %
15 SYRINGE (ML) INTRAVENOUS ONCE
Refills: 0 | Status: DISCONTINUED | OUTPATIENT
Start: 2023-01-01 | End: 2023-01-01

## 2023-01-01 RX ORDER — ACETAMINOPHEN 500 MG
2 TABLET ORAL
Qty: 0 | Refills: 0 | DISCHARGE
Start: 2023-01-01

## 2023-01-01 RX ORDER — PIPERACILLIN AND TAZOBACTAM 4; .5 G/20ML; G/20ML
4.5 INJECTION, POWDER, LYOPHILIZED, FOR SOLUTION INTRAVENOUS ONCE
Refills: 0 | Status: COMPLETED | OUTPATIENT
Start: 2023-01-01 | End: 2023-01-01

## 2023-01-01 RX ORDER — FUROSEMIDE 40 MG
40 TABLET ORAL ONCE
Refills: 0 | Status: COMPLETED | OUTPATIENT
Start: 2023-01-01 | End: 2023-01-01

## 2023-01-01 RX ORDER — ACETYLCYSTEINE 200 MG/ML
4 VIAL (ML) MISCELLANEOUS
Qty: 0 | Refills: 0 | DISCHARGE
Start: 2023-01-01

## 2023-01-01 RX ORDER — SODIUM BICARBONATE 1 MEQ/ML
2.11 SYRINGE (ML) INTRAVENOUS
Qty: 150 | Refills: 0 | Status: DISCONTINUED | OUTPATIENT
Start: 2023-01-01 | End: 2023-01-01

## 2023-01-01 RX ORDER — PIPERACILLIN AND TAZOBACTAM 4; .5 G/20ML; G/20ML
3.38 INJECTION, POWDER, LYOPHILIZED, FOR SOLUTION INTRAVENOUS ONCE
Refills: 0 | Status: DISCONTINUED | OUTPATIENT
Start: 2023-01-01 | End: 2023-01-01

## 2023-01-01 RX ORDER — MIDAZOLAM HYDROCHLORIDE 1 MG/ML
1 INJECTION, SOLUTION INTRAMUSCULAR; INTRAVENOUS ONCE
Refills: 0 | Status: DISCONTINUED | OUTPATIENT
Start: 2023-01-01 | End: 2023-01-01

## 2023-01-01 RX ORDER — SODIUM CHLORIDE 9 MG/ML
250 INJECTION INTRAMUSCULAR; INTRAVENOUS; SUBCUTANEOUS ONCE
Refills: 0 | Status: COMPLETED | OUTPATIENT
Start: 2023-01-01 | End: 2023-01-01

## 2023-01-01 RX ORDER — INSULIN HUMAN 100 [IU]/ML
5 INJECTION, SOLUTION SUBCUTANEOUS EVERY 6 HOURS
Refills: 0 | Status: DISCONTINUED | OUTPATIENT
Start: 2023-01-01 | End: 2023-01-01

## 2023-01-01 RX ORDER — SODIUM CHLORIDE 9 MG/ML
1000 INJECTION INTRAMUSCULAR; INTRAVENOUS; SUBCUTANEOUS ONCE
Refills: 0 | Status: COMPLETED | OUTPATIENT
Start: 2023-01-01 | End: 2023-01-01

## 2023-01-01 RX ORDER — DIPHENHYDRAMINE HCL 50 MG
50 CAPSULE ORAL ONCE
Refills: 0 | Status: DISCONTINUED | OUTPATIENT
Start: 2023-01-01 | End: 2023-01-01

## 2023-01-01 RX ORDER — CHLORHEXIDINE GLUCONATE 213 G/1000ML
1 SOLUTION TOPICAL ONCE
Refills: 0 | Status: DISCONTINUED | OUTPATIENT
Start: 2023-01-01 | End: 2023-01-01

## 2023-01-01 RX ORDER — METRONIDAZOLE 500 MG
500 TABLET ORAL EVERY 8 HOURS
Refills: 0 | Status: DISCONTINUED | OUTPATIENT
Start: 2023-01-01 | End: 2023-01-01

## 2023-01-01 RX ORDER — SODIUM CHLORIDE 5 G/100ML
500 INJECTION, SOLUTION INTRAVENOUS
Refills: 0 | Status: DISCONTINUED | OUTPATIENT
Start: 2023-01-01 | End: 2023-01-01

## 2023-01-01 RX ORDER — SACUBITRIL AND VALSARTAN 24; 26 MG/1; MG/1
1 TABLET, FILM COATED ORAL EVERY 12 HOURS
Refills: 0 | Status: DISCONTINUED | OUTPATIENT
Start: 2023-01-01 | End: 2023-01-01

## 2023-01-01 RX ORDER — AMIODARONE HYDROCHLORIDE 400 MG/1
0.5 TABLET ORAL
Qty: 450 | Refills: 0 | Status: DISCONTINUED | OUTPATIENT
Start: 2023-01-01 | End: 2023-01-01

## 2023-01-01 RX ORDER — GLUCAGON INJECTION, SOLUTION 0.5 MG/.1ML
1 INJECTION, SOLUTION SUBCUTANEOUS ONCE
Refills: 0 | Status: DISCONTINUED | OUTPATIENT
Start: 2023-01-01 | End: 2023-01-01

## 2023-01-01 RX ORDER — SUCRALFATE 1 G
1 TABLET ORAL
Qty: 60 | Refills: 0
Start: 2023-01-01 | End: 2023-08-15

## 2023-01-01 RX ORDER — ALBUMIN HUMAN 25 %
250 VIAL (ML) INTRAVENOUS ONCE
Refills: 0 | Status: COMPLETED | OUTPATIENT
Start: 2023-01-01 | End: 2023-01-01

## 2023-01-01 RX ORDER — INSULIN LISPRO 100/ML
4 VIAL (ML) SUBCUTANEOUS
Refills: 0 | Status: DISCONTINUED | OUTPATIENT
Start: 2023-01-01 | End: 2023-01-01

## 2023-01-01 RX ORDER — POTASSIUM PHOSPHATE, MONOBASIC POTASSIUM PHOSPHATE, DIBASIC 236; 224 MG/ML; MG/ML
15 INJECTION, SOLUTION INTRAVENOUS ONCE
Refills: 0 | Status: DISCONTINUED | OUTPATIENT
Start: 2023-01-01 | End: 2023-01-01

## 2023-01-01 RX ORDER — PSYLLIUM SEED (WITH DEXTROSE)
1 POWDER (GRAM) ORAL
Refills: 0 | Status: DISCONTINUED | OUTPATIENT
Start: 2023-01-01 | End: 2023-01-01

## 2023-01-01 RX ORDER — ATORVASTATIN CALCIUM 20 MG/1
20 TABLET, FILM COATED ORAL
Qty: 90 | Refills: 1 | Status: ACTIVE | COMMUNITY
Start: 2022-03-09 | End: 1900-01-01

## 2023-01-01 RX ORDER — CISATRACURIUM BESYLATE 2 MG/ML
10 INJECTION INTRAVENOUS ONCE
Refills: 0 | Status: COMPLETED | OUTPATIENT
Start: 2023-01-01 | End: 2023-01-01

## 2023-01-01 RX ORDER — CHLORHEXIDINE GLUCONATE 213 G/1000ML
15 SOLUTION TOPICAL EVERY 12 HOURS
Refills: 0 | Status: DISCONTINUED | OUTPATIENT
Start: 2023-01-01 | End: 2023-01-01

## 2023-01-01 RX ORDER — TEMOZOLOMIDE 100 MG/1
100 CAPSULE ORAL
Qty: 8 | Refills: 0 | Status: DISCONTINUED | COMMUNITY
Start: 2022-01-01 | End: 2023-01-01

## 2023-01-01 RX ORDER — HYDRALAZINE HCL 50 MG
10 TABLET ORAL ONCE
Refills: 0 | Status: COMPLETED | OUTPATIENT
Start: 2023-01-01 | End: 2023-01-01

## 2023-01-01 RX ORDER — DEXTROSE 50 % IN WATER 50 %
12.5 SYRINGE (ML) INTRAVENOUS ONCE
Refills: 0 | Status: DISCONTINUED | OUTPATIENT
Start: 2023-01-01 | End: 2023-01-01

## 2023-01-01 RX ORDER — DAPAGLIFLOZIN 10 MG/1
10 TABLET, FILM COATED ORAL
Qty: 90 | Refills: 0 | Status: DISCONTINUED | COMMUNITY
Start: 2023-01-01 | End: 2023-01-01

## 2023-01-01 RX ORDER — INSULIN HUMAN 100 [IU]/ML
4 INJECTION, SOLUTION SUBCUTANEOUS ONCE
Refills: 0 | Status: COMPLETED | OUTPATIENT
Start: 2023-01-01 | End: 2023-01-01

## 2023-01-01 RX ORDER — ENOXAPARIN SODIUM 100 MG/ML
40 INJECTION SUBCUTANEOUS
Refills: 0 | Status: DISCONTINUED | OUTPATIENT
Start: 2023-01-01 | End: 2023-01-01

## 2023-01-01 RX ORDER — CISATRACURIUM BESYLATE 2 MG/ML
3 INJECTION INTRAVENOUS
Qty: 200 | Refills: 0 | Status: DISCONTINUED | OUTPATIENT
Start: 2023-01-01 | End: 2023-01-01

## 2023-01-01 RX ORDER — SODIUM CHLORIDE 9 MG/ML
2 INJECTION INTRAMUSCULAR; INTRAVENOUS; SUBCUTANEOUS EVERY 8 HOURS
Refills: 0 | Status: DISCONTINUED | OUTPATIENT
Start: 2023-01-01 | End: 2023-01-01

## 2023-01-01 RX ORDER — DEXTROSE 50 % IN WATER 50 %
25 SYRINGE (ML) INTRAVENOUS ONCE
Refills: 0 | Status: DISCONTINUED | OUTPATIENT
Start: 2023-01-01 | End: 2023-01-01

## 2023-01-01 RX ORDER — POTASSIUM PHOSPHATE, MONOBASIC POTASSIUM PHOSPHATE, DIBASIC 236; 224 MG/ML; MG/ML
30 INJECTION, SOLUTION INTRAVENOUS ONCE
Refills: 0 | Status: DISCONTINUED | OUTPATIENT
Start: 2023-01-01 | End: 2023-01-01

## 2023-01-01 RX ORDER — SODIUM CHLORIDE 9 MG/ML
4 INJECTION INTRAMUSCULAR; INTRAVENOUS; SUBCUTANEOUS
Qty: 0 | Refills: 0 | DISCHARGE
Start: 2023-01-01

## 2023-01-01 RX ORDER — FLUTICASONE PROPIONATE 50 MCG
1 SPRAY, SUSPENSION NASAL
Qty: 0 | Refills: 0 | DISCHARGE
Start: 2023-01-01

## 2023-01-01 RX ORDER — POTASSIUM CHLORIDE 20 MEQ
20 PACKET (EA) ORAL
Refills: 0 | Status: COMPLETED | OUTPATIENT
Start: 2023-01-01 | End: 2023-01-01

## 2023-01-01 RX ORDER — LEVETIRACETAM 250 MG/1
1 TABLET, FILM COATED ORAL
Qty: 60 | Refills: 0
Start: 2023-01-01 | End: 2023-01-01

## 2023-01-01 RX ORDER — CEFEPIME 1 G/1
INJECTION, POWDER, FOR SOLUTION INTRAMUSCULAR; INTRAVENOUS
Refills: 0 | Status: DISCONTINUED | OUTPATIENT
Start: 2023-01-01 | End: 2023-01-01

## 2023-01-01 RX ORDER — DOBUTAMINE HCL 250MG/20ML
2 VIAL (ML) INTRAVENOUS
Qty: 500 | Refills: 0 | Status: DISCONTINUED | OUTPATIENT
Start: 2023-01-01 | End: 2023-01-01

## 2023-01-01 RX ORDER — HYDROCORTISONE 1 %
1 OINTMENT (GRAM) TOPICAL DAILY
Refills: 0 | Status: DISCONTINUED | OUTPATIENT
Start: 2023-01-01 | End: 2023-01-01

## 2023-01-01 RX ORDER — LANOLIN ALCOHOL/MO/W.PET/CERES
5 CREAM (GRAM) TOPICAL ONCE
Refills: 0 | Status: COMPLETED | OUTPATIENT
Start: 2023-01-01 | End: 2023-01-01

## 2023-01-01 RX ORDER — CHLORHEXIDINE GLUCONATE 213 G/1000ML
1 SOLUTION TOPICAL
Refills: 0 | Status: DISCONTINUED | OUTPATIENT
Start: 2023-01-01 | End: 2023-01-01

## 2023-01-01 RX ORDER — METFORMIN HYDROCHLORIDE 500 MG/1
500 TABLET, COATED ORAL TWICE DAILY
Qty: 180 | Refills: 1 | Status: DISCONTINUED | COMMUNITY
Start: 2022-04-12 | End: 2023-01-01

## 2023-01-01 RX ORDER — INSULIN GLARGINE 100 [IU]/ML
14 INJECTION, SOLUTION SUBCUTANEOUS AT BEDTIME
Refills: 0 | Status: DISCONTINUED | OUTPATIENT
Start: 2023-01-01 | End: 2023-01-01

## 2023-01-01 RX ORDER — ONDANSETRON 8 MG/1
4 TABLET, FILM COATED ORAL ONCE
Refills: 0 | Status: COMPLETED | OUTPATIENT
Start: 2023-01-01 | End: 2023-01-01

## 2023-01-01 RX ORDER — ENOXAPARIN SODIUM 100 MG/ML
75 INJECTION SUBCUTANEOUS EVERY 12 HOURS
Refills: 0 | Status: DISCONTINUED | OUTPATIENT
Start: 2023-01-01 | End: 2023-01-01

## 2023-01-01 RX ORDER — PHYTONADIONE (VIT K1) 5 MG
10 TABLET ORAL ONCE
Refills: 0 | Status: COMPLETED | OUTPATIENT
Start: 2023-01-01 | End: 2023-01-01

## 2023-01-01 RX ORDER — DEXAMETHASONE 0.5 MG/5ML
2 ELIXIR ORAL EVERY 12 HOURS
Refills: 0 | Status: DISCONTINUED | OUTPATIENT
Start: 2023-01-01 | End: 2023-01-01

## 2023-01-01 RX ORDER — INSULIN GLARGINE 100 [IU]/ML
6 INJECTION, SOLUTION SUBCUTANEOUS AT BEDTIME
Refills: 0 | Status: DISCONTINUED | OUTPATIENT
Start: 2023-01-01 | End: 2023-01-01

## 2023-01-01 RX ORDER — FLUDROCORTISONE ACETATE 0.1 MG/1
0.2 TABLET ORAL DAILY
Refills: 0 | Status: DISCONTINUED | OUTPATIENT
Start: 2023-01-01 | End: 2023-01-01

## 2023-01-01 RX ORDER — SODIUM BICARBONATE 1 MEQ/ML
0.05 SYRINGE (ML) INTRAVENOUS
Qty: 150 | Refills: 0 | Status: DISCONTINUED | OUTPATIENT
Start: 2023-01-01 | End: 2023-01-01

## 2023-01-01 RX ORDER — ENOXAPARIN SODIUM 100 MG/ML
70 INJECTION SUBCUTANEOUS EVERY 12 HOURS
Refills: 0 | Status: DISCONTINUED | OUTPATIENT
Start: 2023-01-01 | End: 2023-01-01

## 2023-01-01 RX ORDER — NOREPINEPHRINE BITARTRATE/D5W 8 MG/250ML
0.05 PLASTIC BAG, INJECTION (ML) INTRAVENOUS
Qty: 16 | Refills: 0 | Status: DISCONTINUED | OUTPATIENT
Start: 2023-01-01 | End: 2023-01-01

## 2023-01-01 RX ORDER — SENNA PLUS 8.6 MG/1
2 TABLET ORAL AT BEDTIME
Refills: 0 | Status: DISCONTINUED | OUTPATIENT
Start: 2023-01-01 | End: 2023-01-01

## 2023-01-01 RX ORDER — SODIUM CHLORIDE 9 MG/ML
2 INJECTION INTRAMUSCULAR; INTRAVENOUS; SUBCUTANEOUS EVERY 12 HOURS
Refills: 0 | Status: DISCONTINUED | OUTPATIENT
Start: 2023-01-01 | End: 2023-01-01

## 2023-01-01 RX ORDER — AMIODARONE HYDROCHLORIDE 400 MG/1
1 TABLET ORAL
Qty: 450 | Refills: 0 | Status: COMPLETED | OUTPATIENT
Start: 2023-01-01 | End: 2023-01-01

## 2023-01-01 RX ORDER — LABETALOL HCL 100 MG
10 TABLET ORAL ONCE
Refills: 0 | Status: COMPLETED | OUTPATIENT
Start: 2023-01-01 | End: 2023-01-01

## 2023-01-01 RX ORDER — UREA 15 G
1 POWDER IN PACKET (EA) ORAL
Qty: 28 | Refills: 0
Start: 2023-01-01 | End: 2023-01-01

## 2023-01-01 RX ORDER — MAGNESIUM SULFATE 500 MG/ML
1 VIAL (ML) INJECTION ONCE
Refills: 0 | Status: COMPLETED | OUTPATIENT
Start: 2023-01-01 | End: 2023-01-01

## 2023-01-01 RX ORDER — IOHEXOL 300 MG/ML
30 INJECTION, SOLUTION INTRAVENOUS ONCE
Refills: 0 | Status: COMPLETED | OUTPATIENT
Start: 2023-01-01 | End: 2023-01-01

## 2023-01-01 RX ORDER — IRON SUCROSE 20 MG/ML
300 INJECTION, SOLUTION INTRAVENOUS EVERY 24 HOURS
Refills: 0 | Status: COMPLETED | OUTPATIENT
Start: 2023-01-01 | End: 2023-01-01

## 2023-01-01 RX ORDER — CEFEPIME 1 G/1
2000 INJECTION, POWDER, FOR SOLUTION INTRAMUSCULAR; INTRAVENOUS EVERY 8 HOURS
Refills: 0 | Status: DISCONTINUED | OUTPATIENT
Start: 2023-01-01 | End: 2023-01-01

## 2023-01-01 RX ORDER — HYDROCORTISONE 20 MG
50 TABLET ORAL EVERY 6 HOURS
Refills: 0 | Status: DISCONTINUED | OUTPATIENT
Start: 2023-01-01 | End: 2023-01-01

## 2023-01-01 RX ORDER — METOPROLOL TARTRATE 50 MG
1 TABLET ORAL
Qty: 30 | Refills: 0
Start: 2023-01-01 | End: 2023-01-01

## 2023-01-01 RX ORDER — SENNA PLUS 8.6 MG/1
1 TABLET ORAL
Refills: 0 | DISCHARGE

## 2023-01-01 RX ORDER — DEXAMETHASONE 0.5 MG/5ML
4 ELIXIR ORAL EVERY 12 HOURS
Refills: 0 | Status: DISCONTINUED | OUTPATIENT
Start: 2023-01-01 | End: 2023-01-01

## 2023-01-01 RX ORDER — MAGNESIUM OXIDE 400 MG ORAL TABLET 241.3 MG
400 TABLET ORAL ONCE
Refills: 0 | Status: COMPLETED | OUTPATIENT
Start: 2023-01-01 | End: 2023-01-01

## 2023-01-01 RX ORDER — LEVETIRACETAM 250 MG/1
1 TABLET, FILM COATED ORAL
Qty: 0 | Refills: 0 | DISCHARGE

## 2023-01-01 RX ORDER — SODIUM,POTASSIUM PHOSPHATES 278-250MG
1 POWDER IN PACKET (EA) ORAL EVERY 6 HOURS
Refills: 0 | Status: COMPLETED | OUTPATIENT
Start: 2023-01-01 | End: 2023-01-01

## 2023-01-01 RX ORDER — INSULIN GLARGINE 100 [IU]/ML
18 INJECTION, SOLUTION SUBCUTANEOUS AT BEDTIME
Refills: 0 | Status: DISCONTINUED | OUTPATIENT
Start: 2023-01-01 | End: 2023-01-01

## 2023-01-01 RX ORDER — POTASSIUM CHLORIDE 20 MEQ
20 PACKET (EA) ORAL ONCE
Refills: 0 | Status: COMPLETED | OUTPATIENT
Start: 2023-01-01 | End: 2023-01-01

## 2023-01-01 RX ORDER — CEFEPIME 1 G/1
2000 INJECTION, POWDER, FOR SOLUTION INTRAMUSCULAR; INTRAVENOUS ONCE
Refills: 0 | Status: COMPLETED | OUTPATIENT
Start: 2023-01-01 | End: 2023-01-01

## 2023-01-01 RX ORDER — VANCOMYCIN HCL 1 G
1250 VIAL (EA) INTRAVENOUS ONCE
Refills: 0 | Status: COMPLETED | OUTPATIENT
Start: 2023-01-01 | End: 2023-01-01

## 2023-01-01 RX ORDER — METOPROLOL TARTRATE 50 MG
12.5 TABLET ORAL ONCE
Refills: 0 | Status: COMPLETED | OUTPATIENT
Start: 2023-01-01 | End: 2023-01-01

## 2023-01-01 RX ORDER — LEVETIRACETAM 750 MG/1
750 TABLET, FILM COATED ORAL TWICE DAILY
Qty: 60 | Refills: 6 | Status: ACTIVE | COMMUNITY
Start: 1900-01-01 | End: 1900-01-01

## 2023-01-01 RX ORDER — VANCOMYCIN HCL 1 G
1250 VIAL (EA) INTRAVENOUS EVERY 12 HOURS
Refills: 0 | Status: DISCONTINUED | OUTPATIENT
Start: 2023-01-01 | End: 2023-01-01

## 2023-01-01 RX ORDER — SODIUM CHLORIDE 9 G/ML
0.9 INJECTION, SOLUTION INTRAVENOUS
Qty: 10 | Refills: 0 | Status: DISCONTINUED | COMMUNITY
Start: 2023-01-01 | End: 2023-01-01

## 2023-01-01 RX ORDER — DEXAMETHASONE 0.5 MG/5ML
20 ELIXIR ORAL ONCE
Refills: 0 | Status: DISCONTINUED | OUTPATIENT
Start: 2023-01-01 | End: 2023-01-01

## 2023-01-01 RX ORDER — TEMOZOLOMIDE 140 MG/1
140 CAPSULE ORAL
Qty: 4 | Refills: 0 | Status: DISCONTINUED | COMMUNITY
Start: 2022-08-03 | End: 2023-01-01

## 2023-01-01 RX ORDER — CALCIUM CARBONATE 500(1250)
2 TABLET ORAL ONCE
Refills: 0 | Status: COMPLETED | OUTPATIENT
Start: 2023-01-01 | End: 2023-01-01

## 2023-01-01 RX ORDER — SODIUM CHLORIDE 9 MG/ML
1000 INJECTION, SOLUTION INTRAVENOUS
Refills: 0 | Status: DISCONTINUED | OUTPATIENT
Start: 2023-01-01 | End: 2023-01-01

## 2023-01-01 RX ORDER — LACTOBACILLUS ACIDOPHILUS 100MM CELL
1 CAPSULE ORAL DAILY
Refills: 0 | Status: DISCONTINUED | OUTPATIENT
Start: 2023-01-01 | End: 2023-01-01

## 2023-01-01 RX ORDER — SIMETHICONE 80 MG/1
80 TABLET, CHEWABLE ORAL EVERY 6 HOURS
Refills: 0 | Status: DISCONTINUED | OUTPATIENT
Start: 2023-01-01 | End: 2023-01-01

## 2023-01-01 RX ORDER — POTASSIUM CHLORIDE 20 MEQ
10 PACKET (EA) ORAL
Refills: 0 | Status: DISCONTINUED | OUTPATIENT
Start: 2023-01-01 | End: 2023-01-01

## 2023-01-01 RX ORDER — INSULIN GLARGINE 100 [IU]/ML
8 INJECTION, SOLUTION SUBCUTANEOUS ONCE
Refills: 0 | Status: DISCONTINUED | OUTPATIENT
Start: 2023-01-01 | End: 2023-01-01

## 2023-01-01 RX ORDER — SODIUM CHLORIDE 9 MG/ML
1000 INJECTION INTRAMUSCULAR; INTRAVENOUS; SUBCUTANEOUS
Refills: 0 | Status: DISCONTINUED | OUTPATIENT
Start: 2023-01-01 | End: 2023-01-01

## 2023-01-01 RX ORDER — INSULIN GLARGINE 100 [IU]/ML
8 INJECTION, SOLUTION SUBCUTANEOUS AT BEDTIME
Refills: 0 | Status: DISCONTINUED | OUTPATIENT
Start: 2023-01-01 | End: 2023-01-01

## 2023-01-01 RX ORDER — ASPIRIN/CALCIUM CARB/MAGNESIUM 324 MG
1 TABLET ORAL
Qty: 0 | Refills: 0 | DISCHARGE

## 2023-01-01 RX ORDER — ELECTROLYTE SOLUTION,INJ
1 VIAL (ML) INTRAVENOUS
Refills: 0 | Status: DISCONTINUED | OUTPATIENT
Start: 2023-01-01 | End: 2023-01-01

## 2023-01-01 RX ORDER — PANTOPRAZOLE SODIUM 20 MG/1
40 TABLET, DELAYED RELEASE ORAL EVERY 12 HOURS
Refills: 0 | Status: DISCONTINUED | OUTPATIENT
Start: 2023-01-01 | End: 2023-01-01

## 2023-01-01 RX ORDER — DEXMEDETOMIDINE HYDROCHLORIDE IN 0.9% SODIUM CHLORIDE 4 UG/ML
0.2 INJECTION INTRAVENOUS
Qty: 200 | Refills: 0 | Status: DISCONTINUED | OUTPATIENT
Start: 2023-01-01 | End: 2023-01-01

## 2023-01-01 RX ORDER — POTASSIUM CHLORIDE 20 MEQ
40 PACKET (EA) ORAL ONCE
Refills: 0 | Status: DISCONTINUED | OUTPATIENT
Start: 2023-01-01 | End: 2023-01-01

## 2023-01-01 RX ORDER — VANCOMYCIN HCL 1 G
VIAL (EA) INTRAVENOUS
Refills: 0 | Status: DISCONTINUED | OUTPATIENT
Start: 2023-01-01 | End: 2023-01-01

## 2023-01-01 RX ORDER — IPRATROPIUM/ALBUTEROL SULFATE 18-103MCG
3 AEROSOL WITH ADAPTER (GRAM) INHALATION ONCE
Refills: 0 | Status: COMPLETED | OUTPATIENT
Start: 2023-01-01 | End: 2023-01-01

## 2023-01-01 RX ORDER — MAGNESIUM SULFATE 500 MG/ML
2 VIAL (ML) INJECTION ONCE
Refills: 0 | Status: DISCONTINUED | OUTPATIENT
Start: 2023-01-01 | End: 2023-01-01

## 2023-01-01 RX ORDER — SODIUM BICARBONATE 1 MEQ/ML
50 SYRINGE (ML) INTRAVENOUS ONCE
Refills: 0 | Status: COMPLETED | OUTPATIENT
Start: 2023-01-01 | End: 2023-01-01

## 2023-01-01 RX ORDER — VANCOMYCIN HCL 1 G
500 VIAL (EA) INTRAVENOUS ONCE
Refills: 0 | Status: COMPLETED | OUTPATIENT
Start: 2023-01-01 | End: 2023-01-01

## 2023-01-01 RX ORDER — ESCITALOPRAM OXALATE 10 MG/1
1 TABLET, FILM COATED ORAL
Qty: 0 | Refills: 0 | DISCHARGE
Start: 2023-01-01

## 2023-01-01 RX ORDER — PHENYLEPHRINE HYDROCHLORIDE 10 MG/ML
0.1 INJECTION INTRAVENOUS
Qty: 160 | Refills: 0 | Status: DISCONTINUED | OUTPATIENT
Start: 2023-01-01 | End: 2023-01-01

## 2023-01-01 RX ORDER — LEVETIRACETAM 250 MG/1
750 TABLET, FILM COATED ORAL
Refills: 0 | Status: DISCONTINUED | OUTPATIENT
Start: 2023-01-01 | End: 2023-01-01

## 2023-01-01 RX ORDER — MAGNESIUM OXIDE 400 MG ORAL TABLET 241.3 MG
400 TABLET ORAL EVERY 12 HOURS
Refills: 0 | Status: COMPLETED | OUTPATIENT
Start: 2023-01-01 | End: 2023-01-01

## 2023-01-01 RX ORDER — SODIUM CHLORIDE 9 MG/ML
2 INJECTION INTRAMUSCULAR; INTRAVENOUS; SUBCUTANEOUS
Qty: 240 | Refills: 0
Start: 2023-01-01 | End: 2023-01-01

## 2023-01-01 RX ORDER — PANTOPRAZOLE SODIUM 20 MG/1
40 TABLET, DELAYED RELEASE ORAL DAILY
Refills: 0 | Status: DISCONTINUED | OUTPATIENT
Start: 2023-01-01 | End: 2023-01-01

## 2023-01-01 RX ORDER — METOCLOPRAMIDE HCL 10 MG
10 TABLET ORAL EVERY 8 HOURS
Refills: 0 | Status: DISCONTINUED | OUTPATIENT
Start: 2023-01-01 | End: 2023-01-01

## 2023-01-01 RX ORDER — INSULIN HUMAN 100 [IU]/ML
6 INJECTION, SOLUTION SUBCUTANEOUS EVERY 6 HOURS
Refills: 0 | Status: DISCONTINUED | OUTPATIENT
Start: 2023-01-01 | End: 2023-01-01

## 2023-01-01 RX ORDER — SACUBITRIL AND VALSARTAN 24; 26 MG/1; MG/1
1 TABLET, FILM COATED ORAL
Qty: 0 | Refills: 0 | DISCHARGE
Start: 2023-01-01

## 2023-01-01 RX ORDER — TOBRAMYCIN SULFATE 40 MG/ML
460 VIAL (ML) INJECTION ONCE
Refills: 0 | Status: COMPLETED | OUTPATIENT
Start: 2023-01-01 | End: 2023-01-01

## 2023-01-01 RX ORDER — FUROSEMIDE 40 MG
20 TABLET ORAL ONCE
Refills: 0 | Status: COMPLETED | OUTPATIENT
Start: 2023-01-01 | End: 2023-01-01

## 2023-01-01 RX ORDER — VASOPRESSIN 20 [USP'U]/ML
0.04 INJECTION INTRAVENOUS
Qty: 40 | Refills: 0 | Status: DISCONTINUED | OUTPATIENT
Start: 2023-01-01 | End: 2023-01-01

## 2023-01-01 RX ORDER — PHENYLEPHRINE HYDROCHLORIDE 10 MG/ML
1.7 INJECTION INTRAVENOUS
Qty: 40 | Refills: 0 | Status: DISCONTINUED | OUTPATIENT
Start: 2023-01-01 | End: 2023-01-01

## 2023-01-01 RX ORDER — ADENOSINE 3 MG/ML
6 INJECTION INTRAVENOUS ONCE
Refills: 0 | Status: DISCONTINUED | OUTPATIENT
Start: 2023-01-01 | End: 2023-01-01

## 2023-01-01 RX ORDER — METOPROLOL TARTRATE 50 MG
12.5 TABLET ORAL EVERY 12 HOURS
Refills: 0 | Status: DISCONTINUED | OUTPATIENT
Start: 2023-01-01 | End: 2023-01-01

## 2023-01-01 RX ORDER — ESCITALOPRAM OXALATE 10 MG/1
10 TABLET, FILM COATED ORAL DAILY
Refills: 0 | Status: DISCONTINUED | OUTPATIENT
Start: 2023-01-01 | End: 2023-01-01

## 2023-01-01 RX ORDER — PROPOFOL 10 MG/ML
10 INJECTION, EMULSION INTRAVENOUS
Qty: 1000 | Refills: 0 | Status: DISCONTINUED | OUTPATIENT
Start: 2023-01-01 | End: 2023-01-01

## 2023-01-01 RX ORDER — ONDANSETRON 8 MG/1
4 TABLET, FILM COATED ORAL
Qty: 0 | Refills: 0 | DISCHARGE
Start: 2023-01-01

## 2023-01-01 RX ORDER — MIDODRINE HYDROCHLORIDE 2.5 MG/1
15 TABLET ORAL EVERY 8 HOURS
Refills: 0 | Status: DISCONTINUED | OUTPATIENT
Start: 2023-01-01 | End: 2023-01-01

## 2023-01-01 RX ORDER — PROPOFOL 10 MG/ML
50 INJECTION, EMULSION INTRAVENOUS
Qty: 1000 | Refills: 0 | Status: DISCONTINUED | OUTPATIENT
Start: 2023-01-01 | End: 2023-01-01

## 2023-01-01 RX ORDER — METOPROLOL TARTRATE 50 MG
5 TABLET ORAL ONCE
Refills: 0 | Status: DISCONTINUED | OUTPATIENT
Start: 2023-01-01 | End: 2023-01-01

## 2023-01-01 RX ORDER — FLUTICASONE PROPIONATE 50 MCG
1 SPRAY, SUSPENSION NASAL
Refills: 0 | Status: DISCONTINUED | OUTPATIENT
Start: 2023-01-01 | End: 2023-01-01

## 2023-01-01 RX ORDER — PROPOFOL 10 MG/ML
20 INJECTION, EMULSION INTRAVENOUS
Qty: 1000 | Refills: 0 | Status: DISCONTINUED | OUTPATIENT
Start: 2023-01-01 | End: 2023-01-01

## 2023-01-01 RX ORDER — LEVETIRACETAM 250 MG/1
1000 TABLET, FILM COATED ORAL
Refills: 0 | Status: DISCONTINUED | OUTPATIENT
Start: 2023-01-01 | End: 2023-01-01

## 2023-01-01 RX ORDER — CISATRACURIUM BESYLATE 2 MG/ML
10 INJECTION INTRAVENOUS ONCE
Refills: 0 | Status: DISCONTINUED | OUTPATIENT
Start: 2023-01-01 | End: 2023-01-01

## 2023-01-01 RX ORDER — NOREPINEPHRINE BITARTRATE/D5W 8 MG/250ML
0.01 PLASTIC BAG, INJECTION (ML) INTRAVENOUS
Qty: 8 | Refills: 0 | Status: DISCONTINUED | OUTPATIENT
Start: 2023-01-01 | End: 2023-01-01

## 2023-01-01 RX ORDER — METRONIDAZOLE 500 MG
TABLET ORAL
Refills: 0 | Status: DISCONTINUED | OUTPATIENT
Start: 2023-01-01 | End: 2023-01-01

## 2023-01-01 RX ORDER — INSULIN GLARGINE 100 [IU]/ML
12 INJECTION, SOLUTION SUBCUTANEOUS AT BEDTIME
Refills: 0 | Status: DISCONTINUED | OUTPATIENT
Start: 2023-01-01 | End: 2023-01-01

## 2023-01-01 RX ORDER — LEVETIRACETAM 250 MG/1
750 TABLET, FILM COATED ORAL ONCE
Refills: 0 | Status: COMPLETED | OUTPATIENT
Start: 2023-01-01 | End: 2023-01-01

## 2023-01-01 RX ORDER — AMIODARONE HYDROCHLORIDE 400 MG/1
150 TABLET ORAL ONCE
Refills: 0 | Status: COMPLETED | OUTPATIENT
Start: 2023-01-01 | End: 2023-01-01

## 2023-01-01 RX ORDER — ATORVASTATIN CALCIUM 80 MG/1
1 TABLET, FILM COATED ORAL
Qty: 30 | Refills: 0
Start: 2023-01-01 | End: 2023-01-01

## 2023-01-01 RX ORDER — ACETYLCYSTEINE 200 MG/ML
4 VIAL (ML) MISCELLANEOUS EVERY 6 HOURS
Refills: 0 | Status: DISCONTINUED | OUTPATIENT
Start: 2023-01-01 | End: 2023-01-01

## 2023-01-01 RX ORDER — CALCIUM CARBONATE 500(1250)
2 TABLET ORAL
Qty: 0 | Refills: 0 | DISCHARGE
Start: 2023-01-01

## 2023-01-01 RX ORDER — POTASSIUM CHLORIDE 20 MEQ
40 PACKET (EA) ORAL EVERY 4 HOURS
Refills: 0 | Status: COMPLETED | OUTPATIENT
Start: 2023-01-01 | End: 2023-01-01

## 2023-01-01 RX ORDER — CEFEPIME 1 G/1
2000 INJECTION, POWDER, FOR SOLUTION INTRAMUSCULAR; INTRAVENOUS EVERY 8 HOURS
Refills: 0 | Status: COMPLETED | OUTPATIENT
Start: 2023-01-01 | End: 2023-01-01

## 2023-01-01 RX ORDER — CEFTRIAXONE 500 MG/1
1000 INJECTION, POWDER, FOR SOLUTION INTRAMUSCULAR; INTRAVENOUS EVERY 24 HOURS
Refills: 0 | Status: COMPLETED | OUTPATIENT
Start: 2023-01-01 | End: 2023-01-01

## 2023-01-01 RX ORDER — CEFTRIAXONE 500 MG/1
2000 INJECTION, POWDER, FOR SOLUTION INTRAMUSCULAR; INTRAVENOUS EVERY 24 HOURS
Refills: 0 | Status: DISCONTINUED | OUTPATIENT
Start: 2023-01-01 | End: 2023-01-01

## 2023-01-01 RX ORDER — ENOXAPARIN SODIUM 100 MG/ML
70 INJECTION SUBCUTANEOUS ONCE
Refills: 0 | Status: COMPLETED | OUTPATIENT
Start: 2023-01-01 | End: 2023-01-01

## 2023-01-01 RX ORDER — MAGNESIUM SULFATE 500 MG/ML
4 VIAL (ML) INJECTION ONCE
Refills: 0 | Status: COMPLETED | OUTPATIENT
Start: 2023-01-01 | End: 2023-01-01

## 2023-01-01 RX ORDER — POTASSIUM CHLORIDE 20 MEQ
40 PACKET (EA) ORAL EVERY 4 HOURS
Refills: 0 | Status: DISCONTINUED | OUTPATIENT
Start: 2023-01-01 | End: 2023-01-01

## 2023-01-01 RX ORDER — I.V. FAT EMULSION 20 G/100ML
50 EMULSION INTRAVENOUS
Qty: 3630 | Refills: 0 | Status: DISCONTINUED | OUTPATIENT
Start: 2023-01-01 | End: 2023-01-01

## 2023-01-01 RX ORDER — VANCOMYCIN HCL 1 G
1000 VIAL (EA) INTRAVENOUS EVERY 12 HOURS
Refills: 0 | Status: DISCONTINUED | OUTPATIENT
Start: 2023-01-01 | End: 2023-01-01

## 2023-01-01 RX ORDER — INSULIN HUMAN 100 [IU]/ML
3 INJECTION, SOLUTION SUBCUTANEOUS
Qty: 100 | Refills: 0 | Status: DISCONTINUED | OUTPATIENT
Start: 2023-01-01 | End: 2023-01-01

## 2023-01-01 RX ORDER — DEXAMETHASONE 0.5 MG/5ML
1 ELIXIR ORAL
Qty: 60 | Refills: 0
Start: 2023-01-01 | End: 2023-08-15

## 2023-01-01 RX ORDER — TEMOZOLOMIDE 100 MG/1
100 CAPSULE ORAL AT BEDTIME
Qty: 8 | Refills: 0 | Status: DISCONTINUED | COMMUNITY
Start: 2022-08-03 | End: 2023-01-01

## 2023-01-01 RX ORDER — ESCITALOPRAM OXALATE 10 MG/1
1 TABLET, FILM COATED ORAL
Qty: 30 | Refills: 0
Start: 2023-01-01 | End: 2023-01-01

## 2023-01-01 RX ORDER — CISATRACURIUM BESYLATE 2 MG/ML
5 INJECTION INTRAVENOUS ONCE
Refills: 0 | Status: COMPLETED | OUTPATIENT
Start: 2023-01-01 | End: 2023-01-01

## 2023-01-01 RX ORDER — LANOLIN ALCOHOL/MO/W.PET/CERES
5 CREAM (GRAM) TOPICAL AT BEDTIME
Refills: 0 | Status: DISCONTINUED | OUTPATIENT
Start: 2023-01-01 | End: 2023-01-01

## 2023-01-01 RX ORDER — POTASSIUM PHOSPHATE, MONOBASIC POTASSIUM PHOSPHATE, DIBASIC 236; 224 MG/ML; MG/ML
30 INJECTION, SOLUTION INTRAVENOUS ONCE
Refills: 0 | Status: COMPLETED | OUTPATIENT
Start: 2023-01-01 | End: 2023-01-01

## 2023-01-01 RX ORDER — OMEPRAZOLE 40 MG/1
40 CAPSULE, DELAYED RELEASE ORAL
Refills: 0 | Status: DISCONTINUED | COMMUNITY
End: 2023-01-01

## 2023-01-01 RX ORDER — DIATRIZOATE MEGLUMINE 180 MG/ML
30 INJECTION, SOLUTION INTRAVESICAL ONCE
Refills: 0 | Status: COMPLETED | OUTPATIENT
Start: 2023-01-01 | End: 2023-01-01

## 2023-01-01 RX ORDER — ENOXAPARIN SODIUM 100 MG/ML
75 INJECTION SUBCUTANEOUS
Qty: 0 | Refills: 0 | DISCHARGE
Start: 2023-01-01

## 2023-01-01 RX ORDER — NOREPINEPHRINE BITARTRATE/D5W 8 MG/250ML
0.05 PLASTIC BAG, INJECTION (ML) INTRAVENOUS
Qty: 32 | Refills: 0 | Status: DISCONTINUED | OUTPATIENT
Start: 2023-01-01 | End: 2023-01-01

## 2023-01-01 RX ORDER — NORMAL SALT TABLETS 1 G/G
1 TABLET ORAL EVERY 6 HOURS
Qty: 240 | Refills: 1 | Status: ACTIVE | COMMUNITY
Start: 2022-03-24 | End: 1900-01-01

## 2023-01-01 RX ORDER — INSULIN LISPRO 100/ML
VIAL (ML) SUBCUTANEOUS
Refills: 0 | Status: DISCONTINUED | OUTPATIENT
Start: 2023-01-01 | End: 2023-01-01

## 2023-01-01 RX ORDER — SODIUM CHLORIDE 9 MG/ML
3 INJECTION INTRAMUSCULAR; INTRAVENOUS; SUBCUTANEOUS EVERY 6 HOURS
Refills: 0 | Status: DISCONTINUED | OUTPATIENT
Start: 2023-01-01 | End: 2023-01-01

## 2023-01-01 RX ORDER — CALCIUM GLUCONATE 100 MG/ML
1 VIAL (ML) INTRAVENOUS ONCE
Refills: 0 | Status: COMPLETED | OUTPATIENT
Start: 2023-01-01 | End: 2023-01-01

## 2023-01-01 RX ORDER — UREA 15 G
15 POWDER IN PACKET (EA) ORAL EVERY 12 HOURS
Refills: 0 | Status: DISCONTINUED | OUTPATIENT
Start: 2023-01-01 | End: 2023-01-01

## 2023-01-01 RX ORDER — APIXABAN 5 MG/1
5 TABLET, FILM COATED ORAL
Qty: 180 | Refills: 1 | Status: DISCONTINUED | COMMUNITY
Start: 2023-01-01 | End: 2023-01-01

## 2023-01-01 RX ORDER — ROBINUL 0.2 MG/ML
0.4 INJECTION INTRAMUSCULAR; INTRAVENOUS EVERY 6 HOURS
Refills: 0 | Status: DISCONTINUED | OUTPATIENT
Start: 2023-01-01 | End: 2023-01-01

## 2023-01-01 RX ORDER — METOPROLOL TARTRATE 50 MG
25 TABLET ORAL EVERY 12 HOURS
Refills: 0 | Status: DISCONTINUED | OUTPATIENT
Start: 2023-01-01 | End: 2023-01-01

## 2023-01-01 RX ORDER — MEROPENEM 1 G/30ML
INJECTION INTRAVENOUS
Refills: 0 | Status: DISCONTINUED | OUTPATIENT
Start: 2023-01-01 | End: 2023-01-01

## 2023-01-01 RX ORDER — MEROPENEM 1 G/30ML
1000 INJECTION INTRAVENOUS EVERY 8 HOURS
Refills: 0 | Status: DISCONTINUED | OUTPATIENT
Start: 2023-01-01 | End: 2023-01-01

## 2023-01-01 RX ORDER — PROPOFOL 10 MG/ML
5 INJECTION, EMULSION INTRAVENOUS
Qty: 1000 | Refills: 0 | Status: DISCONTINUED | OUTPATIENT
Start: 2023-01-01 | End: 2023-01-01

## 2023-01-01 RX ORDER — ONDANSETRON 8 MG/1
4 TABLET, FILM COATED ORAL EVERY 8 HOURS
Refills: 0 | Status: DISCONTINUED | OUTPATIENT
Start: 2023-01-01 | End: 2023-01-01

## 2023-01-01 RX ORDER — INSULIN LISPRO 100/ML
VIAL (ML) SUBCUTANEOUS EVERY 6 HOURS
Refills: 0 | Status: DISCONTINUED | OUTPATIENT
Start: 2023-01-01 | End: 2023-01-01

## 2023-01-01 RX ORDER — LEVETIRACETAM 250 MG/1
750 TABLET, FILM COATED ORAL EVERY 12 HOURS
Refills: 0 | Status: DISCONTINUED | OUTPATIENT
Start: 2023-01-01 | End: 2023-01-01

## 2023-01-01 RX ORDER — MEROPENEM 1 G/30ML
2000 INJECTION INTRAVENOUS EVERY 8 HOURS
Refills: 0 | Status: DISCONTINUED | OUTPATIENT
Start: 2023-01-01 | End: 2023-01-01

## 2023-01-01 RX ORDER — POTASSIUM CHLORIDE 20 MEQ
20 PACKET (EA) ORAL
Refills: 0 | Status: DISCONTINUED | OUTPATIENT
Start: 2023-01-01 | End: 2023-01-01

## 2023-01-01 RX ORDER — SITAGLIPTIN 50 MG/1
1 TABLET, FILM COATED ORAL
Qty: 30 | Refills: 0
Start: 2023-01-01 | End: 2023-01-01

## 2023-01-01 RX ORDER — POLYETHYLENE GLYCOL 3350 17 G/17G
17 POWDER, FOR SOLUTION ORAL
Qty: 0 | Refills: 0 | DISCHARGE
Start: 2023-01-01

## 2023-01-01 RX ORDER — POLYETHYLENE GLYCOL 3350 17 G/17G
17 POWDER, FOR SOLUTION ORAL DAILY
Refills: 0 | Status: DISCONTINUED | OUTPATIENT
Start: 2023-01-01 | End: 2023-01-01

## 2023-01-01 RX ORDER — LEVETIRACETAM 250 MG/1
1000 TABLET, FILM COATED ORAL EVERY 12 HOURS
Refills: 0 | Status: DISCONTINUED | OUTPATIENT
Start: 2023-01-01 | End: 2023-01-01

## 2023-01-01 RX ORDER — LANOLIN ALCOHOL/MO/W.PET/CERES
2 CREAM (GRAM) TOPICAL
Qty: 0 | Refills: 0 | DISCHARGE
Start: 2023-01-01

## 2023-01-01 RX ORDER — SODIUM BICARBONATE 1 MEQ/ML
0.16 SYRINGE (ML) INTRAVENOUS
Qty: 150 | Refills: 0 | Status: DISCONTINUED | OUTPATIENT
Start: 2023-01-01 | End: 2023-01-01

## 2023-01-01 RX ORDER — INSULIN GLARGINE 100 [IU]/ML
20 INJECTION, SOLUTION SUBCUTANEOUS AT BEDTIME
Refills: 0 | Status: DISCONTINUED | OUTPATIENT
Start: 2023-01-01 | End: 2023-01-01

## 2023-01-01 RX ORDER — AMIODARONE HYDROCHLORIDE 400 MG/1
1 TABLET ORAL
Qty: 450 | Refills: 0 | Status: DISCONTINUED | OUTPATIENT
Start: 2023-01-01 | End: 2023-01-01

## 2023-01-01 RX ORDER — ISOPROPYL ALCOHOL 70 ML/100ML
SWAB TOPICAL
Qty: 6 | Refills: 3 | Status: ACTIVE | COMMUNITY
Start: 2022-04-12 | End: 1900-01-01

## 2023-01-01 RX ORDER — LACOSAMIDE 50 MG/1
1 TABLET ORAL
Qty: 0 | Refills: 0 | DISCHARGE

## 2023-01-01 RX ORDER — POTASSIUM CHLORIDE 20 MEQ
40 PACKET (EA) ORAL EVERY 6 HOURS
Refills: 0 | Status: COMPLETED | OUTPATIENT
Start: 2023-01-01 | End: 2023-01-01

## 2023-01-01 RX ORDER — METOCLOPRAMIDE HCL 10 MG
10 TABLET ORAL ONCE
Refills: 0 | Status: COMPLETED | OUTPATIENT
Start: 2023-01-01 | End: 2023-01-01

## 2023-01-01 RX ORDER — PANTOPRAZOLE SODIUM 20 MG/1
40 TABLET, DELAYED RELEASE ORAL ONCE
Refills: 0 | Status: COMPLETED | OUTPATIENT
Start: 2023-01-01 | End: 2023-01-01

## 2023-01-01 RX ORDER — PHENYLEPHRINE HYDROCHLORIDE 10 MG/ML
0.1 INJECTION INTRAVENOUS
Qty: 40 | Refills: 0 | Status: DISCONTINUED | OUTPATIENT
Start: 2023-01-01 | End: 2023-01-01

## 2023-01-01 RX ORDER — FENTANYL CITRATE 50 UG/ML
50 INJECTION INTRAVENOUS ONCE
Refills: 0 | Status: DISCONTINUED | OUTPATIENT
Start: 2023-01-01 | End: 2023-01-01

## 2023-01-01 RX ORDER — FENTANYL CITRATE 50 UG/ML
25 INJECTION INTRAVENOUS ONCE
Refills: 0 | Status: DISCONTINUED | OUTPATIENT
Start: 2023-01-01 | End: 2023-01-01

## 2023-01-01 RX ORDER — INSULIN HUMAN 100 [IU]/ML
INJECTION, SOLUTION SUBCUTANEOUS EVERY 6 HOURS
Refills: 0 | Status: DISCONTINUED | OUTPATIENT
Start: 2023-01-01 | End: 2023-01-01

## 2023-01-01 RX ORDER — SENNOSIDES 8.6 MG TABLETS 8.6 MG/1
TABLET ORAL
Refills: 0 | Status: DISCONTINUED | COMMUNITY
End: 2023-01-01

## 2023-01-01 RX ORDER — PHENYLEPHRINE HYDROCHLORIDE 10 MG/ML
0.2 INJECTION INTRAVENOUS
Qty: 160 | Refills: 0 | Status: DISCONTINUED | OUTPATIENT
Start: 2023-01-01 | End: 2023-01-01

## 2023-01-01 RX ORDER — MAGNESIUM SULFATE 500 MG/ML
2 VIAL (ML) INJECTION
Refills: 0 | Status: COMPLETED | OUTPATIENT
Start: 2023-01-01 | End: 2023-01-01

## 2023-01-01 RX ORDER — AMIODARONE HYDROCHLORIDE 400 MG/1
400 TABLET ORAL
Refills: 0 | Status: DISCONTINUED | OUTPATIENT
Start: 2023-01-01 | End: 2023-01-01

## 2023-01-01 RX ORDER — SODIUM,POTASSIUM PHOSPHATES 278-250MG
1 POWDER IN PACKET (EA) ORAL EVERY 4 HOURS
Refills: 0 | Status: COMPLETED | OUTPATIENT
Start: 2023-01-01 | End: 2023-01-01

## 2023-01-01 RX ORDER — SODIUM CHLORIDE 9 MG/ML
3 INJECTION INTRAMUSCULAR; INTRAVENOUS; SUBCUTANEOUS
Qty: 168 | Refills: 0
Start: 2023-01-01 | End: 2023-01-01

## 2023-01-01 RX ORDER — BLOOD SUGAR DIAGNOSTIC
STRIP MISCELLANEOUS DAILY
Qty: 6 | Refills: 3 | Status: ACTIVE | COMMUNITY
Start: 2022-03-26 | End: 1900-01-01

## 2023-01-01 RX ORDER — HYDROCORTISONE 1 %
1 OINTMENT (GRAM) TOPICAL
Qty: 0 | Refills: 0 | DISCHARGE
Start: 2023-01-01

## 2023-01-01 RX ORDER — METOPROLOL TARTRATE 50 MG
50 TABLET ORAL DAILY
Refills: 0 | Status: DISCONTINUED | OUTPATIENT
Start: 2023-01-01 | End: 2023-01-01

## 2023-01-01 RX ORDER — SUCRALFATE 1 G
1 TABLET ORAL EVERY 12 HOURS
Refills: 0 | Status: DISCONTINUED | OUTPATIENT
Start: 2023-01-01 | End: 2023-01-01

## 2023-01-01 RX ORDER — SODIUM CHLORIDE 9 MG/ML
1 INJECTION INTRAMUSCULAR; INTRAVENOUS; SUBCUTANEOUS
Refills: 0 | Status: DISCONTINUED | OUTPATIENT
Start: 2023-01-01 | End: 2023-01-01

## 2023-01-01 RX ORDER — FENTANYL CITRATE 50 UG/ML
25 INJECTION INTRAVENOUS
Refills: 0 | Status: DISCONTINUED | OUTPATIENT
Start: 2023-01-01 | End: 2023-01-01

## 2023-01-01 RX ORDER — SACUBITRIL AND VALSARTAN 24; 26 MG/1; MG/1
24-26 TABLET, FILM COATED ORAL TWICE DAILY
Qty: 180 | Refills: 1 | Status: DISCONTINUED | COMMUNITY
Start: 2023-01-01 | End: 2023-01-01

## 2023-01-01 RX ORDER — HUMAN INSULIN 100 [IU]/ML
10 INJECTION, SUSPENSION SUBCUTANEOUS ONCE
Refills: 0 | Status: COMPLETED | OUTPATIENT
Start: 2023-01-01 | End: 2023-01-01

## 2023-01-01 RX ORDER — CALCIUM CARBONATE 500(1250)
2 TABLET ORAL EVERY 4 HOURS
Refills: 0 | Status: DISCONTINUED | OUTPATIENT
Start: 2023-01-01 | End: 2023-01-01

## 2023-01-01 RX ORDER — SODIUM CHLORIDE 9 MG/ML
500 INJECTION, SOLUTION INTRAVENOUS ONCE
Refills: 0 | Status: DISCONTINUED | OUTPATIENT
Start: 2023-01-01 | End: 2023-01-01

## 2023-01-01 RX ORDER — METRONIDAZOLE 500 MG
500 TABLET ORAL ONCE
Refills: 0 | Status: COMPLETED | OUTPATIENT
Start: 2023-01-01 | End: 2023-01-01

## 2023-01-01 RX ORDER — BENZOCAINE AND MENTHOL 5; 1 G/100ML; G/100ML
1 LIQUID ORAL DAILY
Refills: 0 | Status: DISCONTINUED | OUTPATIENT
Start: 2023-01-01 | End: 2023-01-01

## 2023-01-01 RX ORDER — PANTOPRAZOLE SODIUM 20 MG/1
1 TABLET, DELAYED RELEASE ORAL
Qty: 30 | Refills: 0
Start: 2023-01-01 | End: 2023-01-01

## 2023-01-01 RX ORDER — ONDANSETRON 8 MG/1
1 TABLET, FILM COATED ORAL
Qty: 30 | Refills: 0
Start: 2023-01-01 | End: 2023-01-01

## 2023-01-01 RX ORDER — ASPIRIN/CALCIUM CARB/MAGNESIUM 324 MG
1 TABLET ORAL
Refills: 0 | DISCHARGE

## 2023-01-01 RX ORDER — INSULIN LISPRO 100/ML
VIAL (ML) SUBCUTANEOUS AT BEDTIME
Refills: 0 | Status: DISCONTINUED | OUTPATIENT
Start: 2023-01-01 | End: 2023-01-01

## 2023-01-01 RX ORDER — ONDANSETRON 4 MG/1
4 TABLET ORAL DAILY
Qty: 90 | Refills: 0 | Status: ACTIVE | COMMUNITY
Start: 2022-03-09 | End: 1900-01-01

## 2023-01-01 RX ORDER — INSULIN HUMAN 100 [IU]/ML
2 INJECTION, SOLUTION SUBCUTANEOUS EVERY 6 HOURS
Refills: 0 | Status: DISCONTINUED | OUTPATIENT
Start: 2023-01-01 | End: 2023-01-01

## 2023-01-01 RX ORDER — FENTANYL CITRATE 50 UG/ML
0.5 INJECTION INTRAVENOUS
Qty: 2500 | Refills: 0 | Status: DISCONTINUED | OUTPATIENT
Start: 2023-01-01 | End: 2023-01-01

## 2023-01-01 RX ORDER — ERGOCALCIFEROL 1.25 MG/1
1.25 MG CAPSULE ORAL
Qty: 6 | Refills: 0 | Status: COMPLETED | COMMUNITY
Start: 2023-01-01 | End: 2023-01-01

## 2023-01-01 RX ORDER — IBUPROFEN 200 MG
400 TABLET ORAL EVERY 6 HOURS
Refills: 0 | Status: DISCONTINUED | OUTPATIENT
Start: 2023-01-01 | End: 2023-01-01

## 2023-01-01 RX ORDER — CEFTRIAXONE 500 MG/1
1000 INJECTION, POWDER, FOR SOLUTION INTRAMUSCULAR; INTRAVENOUS EVERY 12 HOURS
Refills: 0 | Status: DISCONTINUED | OUTPATIENT
Start: 2023-01-01 | End: 2023-01-01

## 2023-01-01 RX ORDER — APIXABAN 2.5 MG/1
10 TABLET, FILM COATED ORAL EVERY 12 HOURS
Refills: 0 | Status: DISCONTINUED | OUTPATIENT
Start: 2023-01-01 | End: 2023-01-01

## 2023-01-01 RX ORDER — MAGNESIUM SULFATE 500 MG/ML
4 VIAL (ML) INJECTION ONCE
Refills: 0 | Status: DISCONTINUED | OUTPATIENT
Start: 2023-01-01 | End: 2023-01-01

## 2023-01-01 RX ORDER — LEVETIRACETAM 250 MG/1
1 TABLET, FILM COATED ORAL
Qty: 0 | Refills: 0 | DISCHARGE
Start: 2023-01-01

## 2023-01-01 RX ORDER — APIXABAN 2.5 MG/1
5 TABLET, FILM COATED ORAL EVERY 12 HOURS
Refills: 0 | Status: CANCELLED | OUTPATIENT
Start: 2023-01-01 | End: 2023-01-01

## 2023-01-01 RX ORDER — LANOLIN ALCOHOL/MO/W.PET/CERES
6 CREAM (GRAM) TOPICAL AT BEDTIME
Refills: 0 | Status: DISCONTINUED | OUTPATIENT
Start: 2023-01-01 | End: 2023-01-01

## 2023-01-01 RX ORDER — GABAPENTIN 400 MG/1
100 CAPSULE ORAL AT BEDTIME
Refills: 0 | Status: DISCONTINUED | OUTPATIENT
Start: 2023-01-01 | End: 2023-01-01

## 2023-01-01 RX ORDER — METOPROLOL TARTRATE 50 MG
3 TABLET ORAL ONCE
Refills: 0 | Status: COMPLETED | OUTPATIENT
Start: 2023-01-01 | End: 2023-01-01

## 2023-01-01 RX ORDER — IPRATROPIUM/ALBUTEROL SULFATE 18-103MCG
3 AEROSOL WITH ADAPTER (GRAM) INHALATION EVERY 6 HOURS
Refills: 0 | Status: DISCONTINUED | OUTPATIENT
Start: 2023-01-01 | End: 2023-01-01

## 2023-01-01 RX ORDER — SACUBITRIL AND VALSARTAN 24; 26 MG/1; MG/1
1 TABLET, FILM COATED ORAL
Qty: 60 | Refills: 0
Start: 2023-01-01 | End: 2023-01-01

## 2023-01-01 RX ORDER — INSULIN GLARGINE 100 [IU]/ML
5 INJECTION, SOLUTION SUBCUTANEOUS AT BEDTIME
Refills: 0 | Status: DISCONTINUED | OUTPATIENT
Start: 2023-01-01 | End: 2023-01-01

## 2023-01-01 RX ORDER — APIXABAN 2.5 MG/1
2 TABLET, FILM COATED ORAL
Qty: 66 | Refills: 0
Start: 2023-01-01 | End: 2023-01-01

## 2023-01-01 RX ORDER — SACUBITRIL AND VALSARTAN 49; 51 MG/1; MG/1
TABLET, FILM COATED ORAL
Refills: 0 | Status: DISCONTINUED | COMMUNITY
End: 2023-01-01

## 2023-01-01 RX ORDER — ONDANSETRON 8 MG/1
4 TABLET, FILM COATED ORAL EVERY 6 HOURS
Refills: 0 | Status: DISCONTINUED | OUTPATIENT
Start: 2023-01-01 | End: 2023-01-01

## 2023-01-01 RX ORDER — PANTOPRAZOLE SODIUM 20 MG/1
8 TABLET, DELAYED RELEASE ORAL
Qty: 80 | Refills: 0 | Status: DISCONTINUED | OUTPATIENT
Start: 2023-01-01 | End: 2023-01-01

## 2023-01-01 RX ORDER — DEXTROSE 50 % IN WATER 50 %
25 SYRINGE (ML) INTRAVENOUS
Refills: 0 | Status: DISCONTINUED | OUTPATIENT
Start: 2023-01-01 | End: 2023-01-01

## 2023-01-01 RX ORDER — PANTOPRAZOLE 40 MG/1
40 TABLET, DELAYED RELEASE ORAL
Qty: 90 | Refills: 1 | Status: ACTIVE | COMMUNITY
Start: 2023-01-01 | End: 1900-01-01

## 2023-01-01 RX ORDER — TOLVAPTAN 15 MG/1
15 TABLET ORAL ONCE
Refills: 0 | Status: COMPLETED | OUTPATIENT
Start: 2023-01-01 | End: 2023-01-01

## 2023-01-01 RX ORDER — GABAPENTIN 100 MG/1
100 CAPSULE ORAL
Qty: 90 | Refills: 3 | Status: DISCONTINUED | COMMUNITY
Start: 2022-03-09 | End: 2023-01-01

## 2023-01-01 RX ORDER — POLYETHYLENE GLYCOL 3350 17 G/17G
17 POWDER, FOR SOLUTION ORAL EVERY 12 HOURS
Refills: 0 | Status: DISCONTINUED | OUTPATIENT
Start: 2023-01-01 | End: 2023-01-01

## 2023-01-01 RX ORDER — BENZOCAINE AND MENTHOL 5; 1 G/100ML; G/100ML
1 LIQUID ORAL EVERY 4 HOURS
Refills: 0 | Status: DISCONTINUED | OUTPATIENT
Start: 2023-01-01 | End: 2023-01-01

## 2023-01-01 RX ORDER — PIPERACILLIN AND TAZOBACTAM 4; .5 G/20ML; G/20ML
4.5 INJECTION, POWDER, LYOPHILIZED, FOR SOLUTION INTRAVENOUS EVERY 8 HOURS
Refills: 0 | Status: DISCONTINUED | OUTPATIENT
Start: 2023-01-01 | End: 2023-01-01

## 2023-01-01 RX ORDER — I.V. FAT EMULSION 20 G/100ML
0.69 EMULSION INTRAVENOUS
Qty: 50 | Refills: 0 | Status: DISCONTINUED | OUTPATIENT
Start: 2023-01-01 | End: 2023-01-01

## 2023-01-01 RX ORDER — METOPROLOL TARTRATE 50 MG
1 TABLET ORAL
Qty: 0 | Refills: 0 | DISCHARGE
Start: 2023-01-01

## 2023-01-01 RX ORDER — DEXTROSE 50 % IN WATER 50 %
25 SYRINGE (ML) INTRAVENOUS ONCE
Refills: 0 | Status: COMPLETED | OUTPATIENT
Start: 2023-01-01 | End: 2023-01-01

## 2023-01-01 RX ORDER — BENZOCAINE AND MENTHOL 5; 1 G/100ML; G/100ML
1 LIQUID ORAL
Qty: 0 | Refills: 0 | DISCHARGE
Start: 2023-01-01

## 2023-01-01 RX ORDER — METOPROLOL TARTRATE 50 MG
25 TABLET ORAL DAILY
Refills: 0 | Status: DISCONTINUED | OUTPATIENT
Start: 2023-01-01 | End: 2023-01-01

## 2023-01-01 RX ORDER — LACOSAMIDE 50 MG/1
50 TABLET ORAL EVERY 12 HOURS
Refills: 0 | Status: DISCONTINUED | OUTPATIENT
Start: 2023-01-01 | End: 2023-01-01

## 2023-01-01 RX ORDER — ONDANSETRON 8 MG/1
4 TABLET, FILM COATED ORAL ONCE
Refills: 0 | Status: DISCONTINUED | OUTPATIENT
Start: 2023-01-01 | End: 2023-01-01

## 2023-01-01 RX ORDER — ALBUMIN HUMAN 25 %
50 VIAL (ML) INTRAVENOUS ONCE
Refills: 0 | Status: COMPLETED | OUTPATIENT
Start: 2023-01-01 | End: 2023-01-01

## 2023-01-01 RX ORDER — HYDROMORPHONE HYDROCHLORIDE 2 MG/ML
0.5 INJECTION INTRAMUSCULAR; INTRAVENOUS; SUBCUTANEOUS ONCE
Refills: 0 | Status: DISCONTINUED | OUTPATIENT
Start: 2023-01-01 | End: 2023-01-01

## 2023-01-01 RX ORDER — FENTANYL CITRATE 50 UG/ML
50 INJECTION INTRAVENOUS
Refills: 0 | Status: DISCONTINUED | OUTPATIENT
Start: 2023-01-01 | End: 2023-01-01

## 2023-01-01 RX ORDER — ATORVASTATIN CALCIUM 80 MG/1
1 TABLET, FILM COATED ORAL
Qty: 0 | Refills: 0 | DISCHARGE
Start: 2023-01-01

## 2023-01-01 RX ORDER — APIXABAN 2.5 MG/1
1 TABLET, FILM COATED ORAL
Qty: 66 | Refills: 0 | DISCHARGE
Start: 2023-01-01 | End: 2023-01-01

## 2023-01-01 RX ORDER — LANOLIN ALCOHOL/MO/W.PET/CERES
1 CREAM (GRAM) TOPICAL
Qty: 0 | Refills: 0 | DISCHARGE
Start: 2023-01-01

## 2023-01-01 RX ORDER — SODIUM BICARBONATE 1 MEQ/ML
1.05 SYRINGE (ML) INTRAVENOUS
Qty: 75 | Refills: 0 | Status: DISCONTINUED | OUTPATIENT
Start: 2023-01-01 | End: 2023-01-01

## 2023-01-01 RX ORDER — METRONIDAZOLE 500 MG
500 TABLET ORAL EVERY 8 HOURS
Refills: 0 | Status: COMPLETED | OUTPATIENT
Start: 2023-01-01 | End: 2023-01-01

## 2023-01-01 RX ORDER — CEFTRIAXONE 500 MG/1
1000 INJECTION, POWDER, FOR SOLUTION INTRAMUSCULAR; INTRAVENOUS EVERY 24 HOURS
Refills: 0 | Status: DISCONTINUED | OUTPATIENT
Start: 2023-01-01 | End: 2023-01-01

## 2023-01-01 RX ORDER — FLUDROCORTISONE ACETATE 0.1 MG/1
2 TABLET ORAL
Qty: 28 | Refills: 0
Start: 2023-01-01 | End: 2023-01-01

## 2023-01-01 RX ORDER — HYDROMORPHONE HYDROCHLORIDE 2 MG/ML
0.25 INJECTION INTRAMUSCULAR; INTRAVENOUS; SUBCUTANEOUS ONCE
Refills: 0 | Status: DISCONTINUED | OUTPATIENT
Start: 2023-01-01 | End: 2023-01-01

## 2023-01-01 RX ORDER — HYDROCORTISONE 20 MG
25 TABLET ORAL EVERY 8 HOURS
Refills: 0 | Status: DISCONTINUED | OUTPATIENT
Start: 2023-01-01 | End: 2023-01-01

## 2023-01-01 RX ORDER — DIGOXIN 250 MCG
0.5 TABLET ORAL ONCE
Refills: 0 | Status: DISCONTINUED | OUTPATIENT
Start: 2023-01-01 | End: 2023-01-01

## 2023-01-01 RX ORDER — METOPROLOL TARTRATE 50 MG
5 TABLET ORAL ONCE
Refills: 0 | Status: COMPLETED | OUTPATIENT
Start: 2023-01-01 | End: 2023-01-01

## 2023-01-01 RX ORDER — SODIUM CHLORIDE 9 MG/ML
2 INJECTION INTRAMUSCULAR; INTRAVENOUS; SUBCUTANEOUS
Qty: 0 | Refills: 0 | DISCHARGE
Start: 2023-01-01

## 2023-01-01 RX ORDER — GLUCOSAMINE HCL/CHONDROITIN SU 500-400 MG
3 CAPSULE ORAL AT BEDTIME
Qty: 180 | Refills: 0 | Status: ACTIVE | COMMUNITY
Start: 2022-03-24

## 2023-01-01 RX ORDER — DIGOXIN 250 MCG
500 TABLET ORAL ONCE
Refills: 0 | Status: DISCONTINUED | OUTPATIENT
Start: 2023-01-01 | End: 2023-01-01

## 2023-01-01 RX ORDER — METOCLOPRAMIDE HCL 10 MG
10 TABLET ORAL EVERY 8 HOURS
Refills: 0 | Status: COMPLETED | OUTPATIENT
Start: 2023-01-01 | End: 2023-01-01

## 2023-01-01 RX ADMIN — Medication 20 MILLIEQUIVALENT(S): at 12:48

## 2023-01-01 RX ADMIN — Medication 650 MILLIGRAM(S): at 21:24

## 2023-01-01 RX ADMIN — PROPOFOL 8.71 MICROGRAM(S)/KG/MIN: 10 INJECTION, EMULSION INTRAVENOUS at 00:07

## 2023-01-01 RX ADMIN — AMIODARONE HYDROCHLORIDE 400 MILLIGRAM(S): 400 TABLET ORAL at 18:12

## 2023-01-01 RX ADMIN — ATORVASTATIN CALCIUM 20 MILLIGRAM(S): 80 TABLET, FILM COATED ORAL at 21:43

## 2023-01-01 RX ADMIN — SODIUM CHLORIDE 3 GRAM(S): 9 INJECTION INTRAMUSCULAR; INTRAVENOUS; SUBCUTANEOUS at 05:10

## 2023-01-01 RX ADMIN — INSULIN GLARGINE 5 UNIT(S): 100 INJECTION, SOLUTION SUBCUTANEOUS at 23:23

## 2023-01-01 RX ADMIN — Medication 10 MILLIGRAM(S): at 18:51

## 2023-01-01 RX ADMIN — Medication 2: at 12:20

## 2023-01-01 RX ADMIN — ESCITALOPRAM OXALATE 5 MILLIGRAM(S): 10 TABLET, FILM COATED ORAL at 11:30

## 2023-01-01 RX ADMIN — SODIUM CHLORIDE 3 GRAM(S): 9 INJECTION INTRAMUSCULAR; INTRAVENOUS; SUBCUTANEOUS at 11:23

## 2023-01-01 RX ADMIN — CEFEPIME 100 MILLIGRAM(S): 1 INJECTION, POWDER, FOR SOLUTION INTRAMUSCULAR; INTRAVENOUS at 02:34

## 2023-01-01 RX ADMIN — DIATRIZOATE MEGLUMINE 30 MILLILITER(S): 180 INJECTION, SOLUTION INTRAVESICAL at 11:22

## 2023-01-01 RX ADMIN — Medication 1000 MILLIGRAM(S): at 18:07

## 2023-01-01 RX ADMIN — PANTOPRAZOLE SODIUM 10 MG/HR: 20 TABLET, DELAYED RELEASE ORAL at 10:32

## 2023-01-01 RX ADMIN — Medication 4 MILLILITER(S): at 21:05

## 2023-01-01 RX ADMIN — ESCITALOPRAM OXALATE 5 MILLIGRAM(S): 10 TABLET, FILM COATED ORAL at 11:50

## 2023-01-01 RX ADMIN — SODIUM CHLORIDE 4 MILLILITER(S): 9 INJECTION INTRAMUSCULAR; INTRAVENOUS; SUBCUTANEOUS at 16:53

## 2023-01-01 RX ADMIN — SODIUM CHLORIDE 3 GRAM(S): 9 INJECTION INTRAMUSCULAR; INTRAVENOUS; SUBCUTANEOUS at 18:04

## 2023-01-01 RX ADMIN — Medication 62.5 MILLIMOLE(S): at 09:56

## 2023-01-01 RX ADMIN — SACUBITRIL AND VALSARTAN 1 TABLET(S): 24; 26 TABLET, FILM COATED ORAL at 05:29

## 2023-01-01 RX ADMIN — SODIUM CHLORIDE 4 MILLILITER(S): 9 INJECTION INTRAMUSCULAR; INTRAVENOUS; SUBCUTANEOUS at 21:11

## 2023-01-01 RX ADMIN — ENOXAPARIN SODIUM 70 MILLIGRAM(S): 100 INJECTION SUBCUTANEOUS at 17:30

## 2023-01-01 RX ADMIN — Medication 1 GRAM(S): at 05:42

## 2023-01-01 RX ADMIN — LEVETIRACETAM 750 MILLIGRAM(S): 250 TABLET, FILM COATED ORAL at 05:56

## 2023-01-01 RX ADMIN — INSULIN HUMAN 2: 100 INJECTION, SOLUTION SUBCUTANEOUS at 17:08

## 2023-01-01 RX ADMIN — INSULIN HUMAN 1: 100 INJECTION, SOLUTION SUBCUTANEOUS at 07:56

## 2023-01-01 RX ADMIN — ATORVASTATIN CALCIUM 20 MILLIGRAM(S): 80 TABLET, FILM COATED ORAL at 21:07

## 2023-01-01 RX ADMIN — Medication 1 GRAM(S): at 06:01

## 2023-01-01 RX ADMIN — Medication 20 MILLIEQUIVALENT(S): at 16:21

## 2023-01-01 RX ADMIN — Medication 25 MILLIGRAM(S): at 21:33

## 2023-01-01 RX ADMIN — Medication 10 MILLIGRAM(S): at 06:01

## 2023-01-01 RX ADMIN — Medication 2: at 07:19

## 2023-01-01 RX ADMIN — PANTOPRAZOLE SODIUM 40 MILLIGRAM(S): 20 TABLET, DELAYED RELEASE ORAL at 05:29

## 2023-01-01 RX ADMIN — Medication 6 MILLIGRAM(S): at 21:28

## 2023-01-01 RX ADMIN — LEVETIRACETAM 750 MILLIGRAM(S): 250 TABLET, FILM COATED ORAL at 06:11

## 2023-01-01 RX ADMIN — MAGNESIUM OXIDE 400 MG ORAL TABLET 400 MILLIGRAM(S): 241.3 TABLET ORAL at 12:36

## 2023-01-01 RX ADMIN — Medication 6 MILLIGRAM(S): at 21:43

## 2023-01-01 RX ADMIN — CEFTRIAXONE 100 MILLIGRAM(S): 500 INJECTION, POWDER, FOR SOLUTION INTRAMUSCULAR; INTRAVENOUS at 10:30

## 2023-01-01 RX ADMIN — Medication 1 GRAM(S): at 06:21

## 2023-01-01 RX ADMIN — CEFEPIME 100 MILLIGRAM(S): 1 INJECTION, POWDER, FOR SOLUTION INTRAMUSCULAR; INTRAVENOUS at 17:40

## 2023-01-01 RX ADMIN — CEFEPIME 100 MILLIGRAM(S): 1 INJECTION, POWDER, FOR SOLUTION INTRAMUSCULAR; INTRAVENOUS at 17:49

## 2023-01-01 RX ADMIN — ENOXAPARIN SODIUM 40 MILLIGRAM(S): 100 INJECTION SUBCUTANEOUS at 21:25

## 2023-01-01 RX ADMIN — CHLORHEXIDINE GLUCONATE 1 APPLICATION(S): 213 SOLUTION TOPICAL at 06:08

## 2023-01-01 RX ADMIN — SODIUM CHLORIDE 4 MILLILITER(S): 9 INJECTION INTRAMUSCULAR; INTRAVENOUS; SUBCUTANEOUS at 11:12

## 2023-01-01 RX ADMIN — INSULIN GLARGINE 18 UNIT(S): 100 INJECTION, SOLUTION SUBCUTANEOUS at 21:56

## 2023-01-01 RX ADMIN — ESCITALOPRAM OXALATE 5 MILLIGRAM(S): 10 TABLET, FILM COATED ORAL at 12:30

## 2023-01-01 RX ADMIN — SACUBITRIL AND VALSARTAN 1 TABLET(S): 24; 26 TABLET, FILM COATED ORAL at 17:40

## 2023-01-01 RX ADMIN — Medication 2 MILLIGRAM(S): at 05:43

## 2023-01-01 RX ADMIN — VASOPRESSIN 6 UNIT(S)/MIN: 20 INJECTION INTRAVENOUS at 10:32

## 2023-01-01 RX ADMIN — Medication 6.81 MICROGRAM(S)/KG/MIN: at 14:36

## 2023-01-01 RX ADMIN — Medication 2 MILLIGRAM(S): at 17:07

## 2023-01-01 RX ADMIN — ENOXAPARIN SODIUM 70 MILLIGRAM(S): 100 INJECTION SUBCUTANEOUS at 06:00

## 2023-01-01 RX ADMIN — Medication 100 MILLIGRAM(S): at 13:11

## 2023-01-01 RX ADMIN — Medication 1 APPLICATION(S): at 13:52

## 2023-01-01 RX ADMIN — MEROPENEM 100 MILLIGRAM(S): 1 INJECTION INTRAVENOUS at 05:43

## 2023-01-01 RX ADMIN — Medication 1 APPLICATION(S): at 06:23

## 2023-01-01 RX ADMIN — Medication 100 GRAM(S): at 13:25

## 2023-01-01 RX ADMIN — SODIUM CHLORIDE 2 GRAM(S): 9 INJECTION INTRAMUSCULAR; INTRAVENOUS; SUBCUTANEOUS at 17:30

## 2023-01-01 RX ADMIN — Medication 1 SPRAY(S): at 05:22

## 2023-01-01 RX ADMIN — SODIUM CHLORIDE 3 GRAM(S): 9 INJECTION INTRAMUSCULAR; INTRAVENOUS; SUBCUTANEOUS at 14:21

## 2023-01-01 RX ADMIN — Medication 6.81 MICROGRAM(S)/KG/MIN: at 06:30

## 2023-01-01 RX ADMIN — Medication 2: at 22:39

## 2023-01-01 RX ADMIN — Medication 1 GRAM(S): at 06:33

## 2023-01-01 RX ADMIN — Medication 4 MILLIGRAM(S): at 06:49

## 2023-01-01 RX ADMIN — Medication 2 MILLIGRAM(S): at 17:35

## 2023-01-01 RX ADMIN — Medication 25 MILLIGRAM(S): at 17:06

## 2023-01-01 RX ADMIN — LEVETIRACETAM 750 MILLIGRAM(S): 250 TABLET, FILM COATED ORAL at 17:02

## 2023-01-01 RX ADMIN — FENTANYL CITRATE 25 MICROGRAM(S): 50 INJECTION INTRAVENOUS at 15:13

## 2023-01-01 RX ADMIN — SODIUM CHLORIDE 4 MILLILITER(S): 9 INJECTION INTRAMUSCULAR; INTRAVENOUS; SUBCUTANEOUS at 00:40

## 2023-01-01 RX ADMIN — Medication 4 MILLILITER(S): at 04:48

## 2023-01-01 RX ADMIN — Medication 1 GRAM(S): at 17:52

## 2023-01-01 RX ADMIN — Medication 3 MILLILITER(S): at 20:48

## 2023-01-01 RX ADMIN — SODIUM CHLORIDE 50 MILLILITER(S): 9 INJECTION INTRAMUSCULAR; INTRAVENOUS; SUBCUTANEOUS at 21:26

## 2023-01-01 RX ADMIN — FENTANYL CITRATE 25 MICROGRAM(S): 50 INJECTION INTRAVENOUS at 09:20

## 2023-01-01 RX ADMIN — SODIUM CHLORIDE 4 MILLILITER(S): 9 INJECTION INTRAMUSCULAR; INTRAVENOUS; SUBCUTANEOUS at 05:33

## 2023-01-01 RX ADMIN — Medication 1 SPRAY(S): at 05:27

## 2023-01-01 RX ADMIN — Medication 40 MILLIGRAM(S): at 10:17

## 2023-01-01 RX ADMIN — Medication 100 MILLIGRAM(S): at 06:08

## 2023-01-01 RX ADMIN — LEVETIRACETAM 750 MILLIGRAM(S): 250 TABLET, FILM COATED ORAL at 17:09

## 2023-01-01 RX ADMIN — SODIUM CHLORIDE 2 GRAM(S): 9 INJECTION INTRAMUSCULAR; INTRAVENOUS; SUBCUTANEOUS at 17:49

## 2023-01-01 RX ADMIN — Medication 1 APPLICATION(S): at 12:42

## 2023-01-01 RX ADMIN — CHLORHEXIDINE GLUCONATE 15 MILLILITER(S): 213 SOLUTION TOPICAL at 18:47

## 2023-01-01 RX ADMIN — Medication 400 MILLIGRAM(S): at 15:52

## 2023-01-01 RX ADMIN — ENOXAPARIN SODIUM 70 MILLIGRAM(S): 100 INJECTION SUBCUTANEOUS at 17:48

## 2023-01-01 RX ADMIN — Medication 40 MILLIEQUIVALENT(S): at 10:35

## 2023-01-01 RX ADMIN — SODIUM CHLORIDE 2 GRAM(S): 9 INJECTION INTRAMUSCULAR; INTRAVENOUS; SUBCUTANEOUS at 19:59

## 2023-01-01 RX ADMIN — SODIUM CHLORIDE 2 GRAM(S): 9 INJECTION INTRAMUSCULAR; INTRAVENOUS; SUBCUTANEOUS at 11:59

## 2023-01-01 RX ADMIN — Medication 100 MILLIEQUIVALENT(S): at 03:44

## 2023-01-01 RX ADMIN — CHLORHEXIDINE GLUCONATE 1 APPLICATION(S): 213 SOLUTION TOPICAL at 05:26

## 2023-01-01 RX ADMIN — ESCITALOPRAM OXALATE 5 MILLIGRAM(S): 10 TABLET, FILM COATED ORAL at 12:29

## 2023-01-01 RX ADMIN — Medication 1 SPRAY(S): at 06:17

## 2023-01-01 RX ADMIN — Medication 2: at 06:07

## 2023-01-01 RX ADMIN — Medication 400 MILLIGRAM(S): at 22:20

## 2023-01-01 RX ADMIN — SODIUM CHLORIDE 25 MILLILITER(S): 5 INJECTION, SOLUTION INTRAVENOUS at 14:08

## 2023-01-01 RX ADMIN — LACOSAMIDE 110 MILLIGRAM(S): 50 TABLET ORAL at 18:07

## 2023-01-01 RX ADMIN — SENNA PLUS 2 TABLET(S): 8.6 TABLET ORAL at 22:52

## 2023-01-01 RX ADMIN — Medication 20 MILLIGRAM(S): at 03:30

## 2023-01-01 RX ADMIN — TOLVAPTAN 15 MILLIGRAM(S): 15 TABLET ORAL at 17:05

## 2023-01-01 RX ADMIN — Medication 1 APPLICATION(S): at 13:56

## 2023-01-01 RX ADMIN — INSULIN HUMAN 2 UNIT(S): 100 INJECTION, SOLUTION SUBCUTANEOUS at 06:40

## 2023-01-01 RX ADMIN — Medication 1 TABLET(S): at 11:57

## 2023-01-01 RX ADMIN — SACUBITRIL AND VALSARTAN 1 TABLET(S): 24; 26 TABLET, FILM COATED ORAL at 05:44

## 2023-01-01 RX ADMIN — LEVETIRACETAM 750 MILLIGRAM(S): 250 TABLET, FILM COATED ORAL at 06:12

## 2023-01-01 RX ADMIN — Medication 1 APPLICATION(S): at 21:40

## 2023-01-01 RX ADMIN — SODIUM CHLORIDE 2 GRAM(S): 9 INJECTION INTRAMUSCULAR; INTRAVENOUS; SUBCUTANEOUS at 05:05

## 2023-01-01 RX ADMIN — Medication 2: at 23:56

## 2023-01-01 RX ADMIN — Medication 1 GRAM(S): at 05:54

## 2023-01-01 RX ADMIN — Medication 166.67 MILLIGRAM(S): at 11:49

## 2023-01-01 RX ADMIN — Medication 1000 MILLIGRAM(S): at 17:00

## 2023-01-01 RX ADMIN — PROPOFOL 4.27 MICROGRAM(S)/KG/MIN: 10 INJECTION, EMULSION INTRAVENOUS at 07:49

## 2023-01-01 RX ADMIN — LACOSAMIDE 110 MILLIGRAM(S): 50 TABLET ORAL at 18:30

## 2023-01-01 RX ADMIN — PHENYLEPHRINE HYDROCHLORIDE 2.72 MICROGRAM(S)/KG/MIN: 10 INJECTION INTRAVENOUS at 23:49

## 2023-01-01 RX ADMIN — SODIUM CHLORIDE 3 GRAM(S): 9 INJECTION INTRAMUSCULAR; INTRAVENOUS; SUBCUTANEOUS at 05:57

## 2023-01-01 RX ADMIN — LEVETIRACETAM 400 MILLIGRAM(S): 250 TABLET, FILM COATED ORAL at 06:46

## 2023-01-01 RX ADMIN — SODIUM CHLORIDE 2 GRAM(S): 9 INJECTION INTRAMUSCULAR; INTRAVENOUS; SUBCUTANEOUS at 11:07

## 2023-01-01 RX ADMIN — SODIUM CHLORIDE 3 GRAM(S): 9 INJECTION INTRAMUSCULAR; INTRAVENOUS; SUBCUTANEOUS at 23:22

## 2023-01-01 RX ADMIN — Medication 10 MILLIGRAM(S): at 18:10

## 2023-01-01 RX ADMIN — CEFTRIAXONE 100 MILLIGRAM(S): 500 INJECTION, POWDER, FOR SOLUTION INTRAMUSCULAR; INTRAVENOUS at 12:01

## 2023-01-01 RX ADMIN — SODIUM CHLORIDE 4 MILLILITER(S): 9 INJECTION INTRAMUSCULAR; INTRAVENOUS; SUBCUTANEOUS at 15:19

## 2023-01-01 RX ADMIN — LEVETIRACETAM 750 MILLIGRAM(S): 250 TABLET, FILM COATED ORAL at 18:12

## 2023-01-01 RX ADMIN — Medication 30 MILLILITER(S): at 21:50

## 2023-01-01 RX ADMIN — AMIODARONE HYDROCHLORIDE 400 MILLIGRAM(S): 400 TABLET ORAL at 17:38

## 2023-01-01 RX ADMIN — Medication 10 MILLIGRAM(S): at 06:15

## 2023-01-01 RX ADMIN — Medication 4: at 12:33

## 2023-01-01 RX ADMIN — ENOXAPARIN SODIUM 70 MILLIGRAM(S): 100 INJECTION SUBCUTANEOUS at 18:13

## 2023-01-01 RX ADMIN — Medication 650 MILLIGRAM(S): at 00:42

## 2023-01-01 RX ADMIN — Medication 40 MILLIEQUIVALENT(S): at 12:33

## 2023-01-01 RX ADMIN — MIDODRINE HYDROCHLORIDE 15 MILLIGRAM(S): 2.5 TABLET ORAL at 22:03

## 2023-01-01 RX ADMIN — FENTANYL CITRATE 50 MICROGRAM(S): 50 INJECTION INTRAVENOUS at 21:16

## 2023-01-01 RX ADMIN — Medication 650 MILLIGRAM(S): at 16:19

## 2023-01-01 RX ADMIN — Medication 2: at 22:37

## 2023-01-01 RX ADMIN — PIPERACILLIN AND TAZOBACTAM 25 GRAM(S): 4; .5 INJECTION, POWDER, LYOPHILIZED, FOR SOLUTION INTRAVENOUS at 06:00

## 2023-01-01 RX ADMIN — INSULIN HUMAN 2 UNIT(S): 100 INJECTION, SOLUTION SUBCUTANEOUS at 23:22

## 2023-01-01 RX ADMIN — Medication 1 APPLICATION(S): at 22:16

## 2023-01-01 RX ADMIN — Medication 4: at 11:24

## 2023-01-01 RX ADMIN — SODIUM CHLORIDE 4 MILLILITER(S): 9 INJECTION INTRAMUSCULAR; INTRAVENOUS; SUBCUTANEOUS at 05:42

## 2023-01-01 RX ADMIN — Medication 10 MILLIGRAM(S): at 21:40

## 2023-01-01 RX ADMIN — Medication 1 GRAM(S): at 11:23

## 2023-01-01 RX ADMIN — Medication 5 MILLIGRAM(S): at 21:59

## 2023-01-01 RX ADMIN — Medication 1 PACKET(S): at 17:12

## 2023-01-01 RX ADMIN — Medication 1 SPRAY(S): at 06:28

## 2023-01-01 RX ADMIN — Medication 2: at 06:55

## 2023-01-01 RX ADMIN — LEVETIRACETAM 750 MILLIGRAM(S): 250 TABLET, FILM COATED ORAL at 06:00

## 2023-01-01 RX ADMIN — Medication 25 MILLIGRAM(S): at 18:36

## 2023-01-01 RX ADMIN — LEVETIRACETAM 750 MILLIGRAM(S): 250 TABLET, FILM COATED ORAL at 17:51

## 2023-01-01 RX ADMIN — Medication 2: at 23:28

## 2023-01-01 RX ADMIN — SACUBITRIL AND VALSARTAN 1 TABLET(S): 24; 26 TABLET, FILM COATED ORAL at 17:03

## 2023-01-01 RX ADMIN — Medication 1 GRAM(S): at 05:09

## 2023-01-01 RX ADMIN — FENTANYL CITRATE 3.63 MICROGRAM(S)/KG/HR: 50 INJECTION INTRAVENOUS at 22:18

## 2023-01-01 RX ADMIN — SODIUM CHLORIDE 2 GRAM(S): 9 INJECTION INTRAMUSCULAR; INTRAVENOUS; SUBCUTANEOUS at 06:43

## 2023-01-01 RX ADMIN — Medication 100 MILLIGRAM(S): at 12:58

## 2023-01-01 RX ADMIN — CHLORHEXIDINE GLUCONATE 1 APPLICATION(S): 213 SOLUTION TOPICAL at 05:19

## 2023-01-01 RX ADMIN — Medication 4 MILLILITER(S): at 06:04

## 2023-01-01 RX ADMIN — SACUBITRIL AND VALSARTAN 1 TABLET(S): 24; 26 TABLET, FILM COATED ORAL at 05:13

## 2023-01-01 RX ADMIN — CHLORHEXIDINE GLUCONATE 15 MILLILITER(S): 213 SOLUTION TOPICAL at 06:32

## 2023-01-01 RX ADMIN — SODIUM CHLORIDE 2 GRAM(S): 9 INJECTION INTRAMUSCULAR; INTRAVENOUS; SUBCUTANEOUS at 11:30

## 2023-01-01 RX ADMIN — SODIUM CHLORIDE 120 MILLILITER(S): 9 INJECTION INTRAMUSCULAR; INTRAVENOUS; SUBCUTANEOUS at 13:55

## 2023-01-01 RX ADMIN — POTASSIUM PHOSPHATE, MONOBASIC POTASSIUM PHOSPHATE, DIBASIC 62.5 MILLIMOLE(S): 236; 224 INJECTION, SOLUTION INTRAVENOUS at 09:19

## 2023-01-01 RX ADMIN — ESCITALOPRAM OXALATE 5 MILLIGRAM(S): 10 TABLET, FILM COATED ORAL at 00:50

## 2023-01-01 RX ADMIN — INSULIN GLARGINE 14 UNIT(S): 100 INJECTION, SOLUTION SUBCUTANEOUS at 22:06

## 2023-01-01 RX ADMIN — Medication 2: at 17:24

## 2023-01-01 RX ADMIN — Medication 25 GRAM(S): at 00:38

## 2023-01-01 RX ADMIN — Medication 1 APPLICATION(S): at 11:48

## 2023-01-01 RX ADMIN — Medication 1000 MILLIGRAM(S): at 17:24

## 2023-01-01 RX ADMIN — Medication 1 PACKET(S): at 11:48

## 2023-01-01 RX ADMIN — Medication 40 MILLIEQUIVALENT(S): at 23:09

## 2023-01-01 RX ADMIN — Medication 40 MILLIGRAM(S): at 08:43

## 2023-01-01 RX ADMIN — PROPOFOL 21.8 MICROGRAM(S)/KG/MIN: 10 INJECTION, EMULSION INTRAVENOUS at 06:21

## 2023-01-01 RX ADMIN — ENOXAPARIN SODIUM 40 MILLIGRAM(S): 100 INJECTION SUBCUTANEOUS at 22:20

## 2023-01-01 RX ADMIN — ONDANSETRON 4 MILLIGRAM(S): 8 TABLET, FILM COATED ORAL at 11:10

## 2023-01-01 RX ADMIN — SODIUM CHLORIDE 4 MILLILITER(S): 9 INJECTION INTRAMUSCULAR; INTRAVENOUS; SUBCUTANEOUS at 09:12

## 2023-01-01 RX ADMIN — Medication 1 SPRAY(S): at 17:31

## 2023-01-01 RX ADMIN — Medication 1 SPRAY(S): at 05:10

## 2023-01-01 RX ADMIN — MAGNESIUM OXIDE 400 MG ORAL TABLET 400 MILLIGRAM(S): 241.3 TABLET ORAL at 17:48

## 2023-01-01 RX ADMIN — LEVETIRACETAM 750 MILLIGRAM(S): 250 TABLET, FILM COATED ORAL at 17:39

## 2023-01-01 RX ADMIN — PANTOPRAZOLE SODIUM 40 MILLIGRAM(S): 20 TABLET, DELAYED RELEASE ORAL at 06:15

## 2023-01-01 RX ADMIN — SODIUM CHLORIDE 2 GRAM(S): 9 INJECTION INTRAMUSCULAR; INTRAVENOUS; SUBCUTANEOUS at 00:58

## 2023-01-01 RX ADMIN — SODIUM CHLORIDE 2 GRAM(S): 9 INJECTION INTRAMUSCULAR; INTRAVENOUS; SUBCUTANEOUS at 05:44

## 2023-01-01 RX ADMIN — Medication 5 MILLIGRAM(S): at 23:09

## 2023-01-01 RX ADMIN — INSULIN GLARGINE 5 UNIT(S): 100 INJECTION, SOLUTION SUBCUTANEOUS at 00:33

## 2023-01-01 RX ADMIN — SENNA PLUS 2 TABLET(S): 8.6 TABLET ORAL at 22:18

## 2023-01-01 RX ADMIN — Medication 4 MILLIGRAM(S): at 18:13

## 2023-01-01 RX ADMIN — CHLORHEXIDINE GLUCONATE 1 APPLICATION(S): 213 SOLUTION TOPICAL at 06:09

## 2023-01-01 RX ADMIN — SODIUM CHLORIDE 2 GRAM(S): 9 INJECTION INTRAMUSCULAR; INTRAVENOUS; SUBCUTANEOUS at 11:58

## 2023-01-01 RX ADMIN — Medication 25 GRAM(S): at 10:06

## 2023-01-01 RX ADMIN — SODIUM CHLORIDE 4 MILLILITER(S): 9 INJECTION INTRAMUSCULAR; INTRAVENOUS; SUBCUTANEOUS at 16:47

## 2023-01-01 RX ADMIN — Medication 2 MILLIGRAM(S): at 05:37

## 2023-01-01 RX ADMIN — Medication 2: at 22:46

## 2023-01-01 RX ADMIN — FENTANYL CITRATE 25 MICROGRAM(S): 50 INJECTION INTRAVENOUS at 23:24

## 2023-01-01 RX ADMIN — Medication 1 SPRAY(S): at 18:02

## 2023-01-01 RX ADMIN — Medication 4 MILLILITER(S): at 06:37

## 2023-01-01 RX ADMIN — SODIUM CHLORIDE 3 GRAM(S): 9 INJECTION INTRAMUSCULAR; INTRAVENOUS; SUBCUTANEOUS at 05:04

## 2023-01-01 RX ADMIN — ENOXAPARIN SODIUM 75 MILLIGRAM(S): 100 INJECTION SUBCUTANEOUS at 05:12

## 2023-01-01 RX ADMIN — SODIUM CHLORIDE 4 MILLILITER(S): 9 INJECTION INTRAMUSCULAR; INTRAVENOUS; SUBCUTANEOUS at 10:32

## 2023-01-01 RX ADMIN — MIDODRINE HYDROCHLORIDE 15 MILLIGRAM(S): 2.5 TABLET ORAL at 15:11

## 2023-01-01 RX ADMIN — Medication 50 MILLILITER(S): at 18:16

## 2023-01-01 RX ADMIN — PANTOPRAZOLE SODIUM 40 MILLIGRAM(S): 20 TABLET, DELAYED RELEASE ORAL at 06:00

## 2023-01-01 RX ADMIN — SODIUM CHLORIDE 2 GRAM(S): 9 INJECTION INTRAMUSCULAR; INTRAVENOUS; SUBCUTANEOUS at 05:38

## 2023-01-01 RX ADMIN — Medication 100 MILLIGRAM(S): at 05:37

## 2023-01-01 RX ADMIN — LEVETIRACETAM 750 MILLIGRAM(S): 250 TABLET, FILM COATED ORAL at 17:37

## 2023-01-01 RX ADMIN — Medication 1 GRAM(S): at 18:03

## 2023-01-01 RX ADMIN — SODIUM CHLORIDE 3 GRAM(S): 9 INJECTION INTRAMUSCULAR; INTRAVENOUS; SUBCUTANEOUS at 17:23

## 2023-01-01 RX ADMIN — Medication 2: at 06:19

## 2023-01-01 RX ADMIN — SODIUM CHLORIDE 3 GRAM(S): 9 INJECTION INTRAMUSCULAR; INTRAVENOUS; SUBCUTANEOUS at 23:01

## 2023-01-01 RX ADMIN — SODIUM CHLORIDE 3 GRAM(S): 9 INJECTION INTRAMUSCULAR; INTRAVENOUS; SUBCUTANEOUS at 06:21

## 2023-01-01 RX ADMIN — Medication 1.34 MICROGRAM(S)/KG/MIN: at 19:54

## 2023-01-01 RX ADMIN — Medication 400 MILLIGRAM(S): at 00:34

## 2023-01-01 RX ADMIN — AMIODARONE HYDROCHLORIDE 16.7 MG/MIN: 400 TABLET ORAL at 18:07

## 2023-01-01 RX ADMIN — Medication 4 MILLILITER(S): at 23:24

## 2023-01-01 RX ADMIN — Medication 4 MILLIGRAM(S): at 18:05

## 2023-01-01 RX ADMIN — Medication 25 GRAM(S): at 07:40

## 2023-01-01 RX ADMIN — Medication 250 MILLIGRAM(S): at 05:54

## 2023-01-01 RX ADMIN — FENTANYL CITRATE 25 MICROGRAM(S): 50 INJECTION INTRAVENOUS at 10:04

## 2023-01-01 RX ADMIN — IRON SUCROSE 176.67 MILLIGRAM(S): 20 INJECTION, SOLUTION INTRAVENOUS at 21:18

## 2023-01-01 RX ADMIN — Medication 1 GRAM(S): at 18:07

## 2023-01-01 RX ADMIN — POTASSIUM PHOSPHATE, MONOBASIC POTASSIUM PHOSPHATE, DIBASIC 62.5 MILLIMOLE(S): 236; 224 INJECTION, SOLUTION INTRAVENOUS at 23:44

## 2023-01-01 RX ADMIN — Medication 4: at 17:57

## 2023-01-01 RX ADMIN — MEROPENEM 100 MILLIGRAM(S): 1 INJECTION INTRAVENOUS at 13:55

## 2023-01-01 RX ADMIN — Medication 62.5 MILLIMOLE(S): at 11:29

## 2023-01-01 RX ADMIN — SODIUM CHLORIDE 3 GRAM(S): 9 INJECTION INTRAMUSCULAR; INTRAVENOUS; SUBCUTANEOUS at 11:16

## 2023-01-01 RX ADMIN — INSULIN GLARGINE 14 UNIT(S): 100 INJECTION, SOLUTION SUBCUTANEOUS at 22:46

## 2023-01-01 RX ADMIN — ESCITALOPRAM OXALATE 5 MILLIGRAM(S): 10 TABLET, FILM COATED ORAL at 11:07

## 2023-01-01 RX ADMIN — Medication 2: at 23:03

## 2023-01-01 RX ADMIN — FENTANYL CITRATE 25 MICROGRAM(S): 50 INJECTION INTRAVENOUS at 14:12

## 2023-01-01 RX ADMIN — LEVETIRACETAM 400 MILLIGRAM(S): 250 TABLET, FILM COATED ORAL at 17:42

## 2023-01-01 RX ADMIN — Medication 4 MILLIGRAM(S): at 05:35

## 2023-01-01 RX ADMIN — LEVETIRACETAM 400 MILLIGRAM(S): 250 TABLET, FILM COATED ORAL at 07:00

## 2023-01-01 RX ADMIN — Medication 1 GRAM(S): at 23:42

## 2023-01-01 RX ADMIN — SENNA PLUS 2 TABLET(S): 8.6 TABLET ORAL at 22:36

## 2023-01-01 RX ADMIN — Medication 123 MILLIGRAM(S): at 21:43

## 2023-01-01 RX ADMIN — FENTANYL CITRATE 25 MICROGRAM(S): 50 INJECTION INTRAVENOUS at 11:29

## 2023-01-01 RX ADMIN — SACUBITRIL AND VALSARTAN 1 TABLET(S): 24; 26 TABLET, FILM COATED ORAL at 06:24

## 2023-01-01 RX ADMIN — Medication 1 PACKET(S): at 05:42

## 2023-01-01 RX ADMIN — HYDROMORPHONE HYDROCHLORIDE 0.5 MILLIGRAM(S): 2 INJECTION INTRAMUSCULAR; INTRAVENOUS; SUBCUTANEOUS at 00:33

## 2023-01-01 RX ADMIN — CEFEPIME 100 MILLIGRAM(S): 1 INJECTION, POWDER, FOR SOLUTION INTRAMUSCULAR; INTRAVENOUS at 09:25

## 2023-01-01 RX ADMIN — PROPOFOL 8.71 MICROGRAM(S)/KG/MIN: 10 INJECTION, EMULSION INTRAVENOUS at 12:38

## 2023-01-01 RX ADMIN — ENOXAPARIN SODIUM 70 MILLIGRAM(S): 100 INJECTION SUBCUTANEOUS at 05:05

## 2023-01-01 RX ADMIN — Medication 100 GRAM(S): at 03:53

## 2023-01-01 RX ADMIN — SACUBITRIL AND VALSARTAN 1 TABLET(S): 24; 26 TABLET, FILM COATED ORAL at 17:31

## 2023-01-01 RX ADMIN — SODIUM CHLORIDE 3 GRAM(S): 9 INJECTION INTRAMUSCULAR; INTRAVENOUS; SUBCUTANEOUS at 12:35

## 2023-01-01 RX ADMIN — Medication 25 GRAM(S): at 07:30

## 2023-01-01 RX ADMIN — LEVETIRACETAM 400 MILLIGRAM(S): 250 TABLET, FILM COATED ORAL at 18:03

## 2023-01-01 RX ADMIN — PIPERACILLIN AND TAZOBACTAM 25 GRAM(S): 4; .5 INJECTION, POWDER, LYOPHILIZED, FOR SOLUTION INTRAVENOUS at 20:00

## 2023-01-01 RX ADMIN — Medication 10 MILLIGRAM(S): at 22:14

## 2023-01-01 RX ADMIN — Medication 1 SPRAY(S): at 06:30

## 2023-01-01 RX ADMIN — Medication 5 MILLIGRAM(S): at 21:20

## 2023-01-01 RX ADMIN — PROPOFOL 21.8 MICROGRAM(S)/KG/MIN: 10 INJECTION, EMULSION INTRAVENOUS at 04:29

## 2023-01-01 RX ADMIN — LACOSAMIDE 110 MILLIGRAM(S): 50 TABLET ORAL at 07:07

## 2023-01-01 RX ADMIN — Medication 100 MILLIEQUIVALENT(S): at 09:32

## 2023-01-01 RX ADMIN — Medication 4 MILLILITER(S): at 05:14

## 2023-01-01 RX ADMIN — Medication 2: at 22:32

## 2023-01-01 RX ADMIN — Medication 5 MILLIGRAM(S): at 09:50

## 2023-01-01 RX ADMIN — Medication 6.81 MICROGRAM(S)/KG/MIN: at 03:52

## 2023-01-01 RX ADMIN — SODIUM CHLORIDE 2 GRAM(S): 9 INJECTION INTRAMUSCULAR; INTRAVENOUS; SUBCUTANEOUS at 10:33

## 2023-01-01 RX ADMIN — INSULIN HUMAN 2 UNIT(S): 100 INJECTION, SOLUTION SUBCUTANEOUS at 11:26

## 2023-01-01 RX ADMIN — Medication 1 APPLICATION(S): at 11:11

## 2023-01-01 RX ADMIN — FLUDROCORTISONE ACETATE 0.2 MILLIGRAM(S): 0.1 TABLET ORAL at 06:40

## 2023-01-01 RX ADMIN — SODIUM CHLORIDE 3 GRAM(S): 9 INJECTION INTRAMUSCULAR; INTRAVENOUS; SUBCUTANEOUS at 11:38

## 2023-01-01 RX ADMIN — Medication 4 UNIT(S): at 13:09

## 2023-01-01 RX ADMIN — Medication 6.81 MICROGRAM(S)/KG/MIN: at 23:33

## 2023-01-01 RX ADMIN — SODIUM CHLORIDE 4 MILLILITER(S): 9 INJECTION INTRAMUSCULAR; INTRAVENOUS; SUBCUTANEOUS at 05:27

## 2023-01-01 RX ADMIN — PANTOPRAZOLE SODIUM 40 MILLIGRAM(S): 20 TABLET, DELAYED RELEASE ORAL at 18:41

## 2023-01-01 RX ADMIN — MIDODRINE HYDROCHLORIDE 15 MILLIGRAM(S): 2.5 TABLET ORAL at 05:39

## 2023-01-01 RX ADMIN — Medication 4 MILLIGRAM(S): at 19:05

## 2023-01-01 RX ADMIN — Medication 6: at 12:36

## 2023-01-01 RX ADMIN — Medication 25 MILLIGRAM(S): at 09:52

## 2023-01-01 RX ADMIN — Medication 1 SPRAY(S): at 06:47

## 2023-01-01 RX ADMIN — Medication 2: at 07:28

## 2023-01-01 RX ADMIN — INSULIN HUMAN 4: 100 INJECTION, SOLUTION SUBCUTANEOUS at 11:27

## 2023-01-01 RX ADMIN — ATORVASTATIN CALCIUM 20 MILLIGRAM(S): 80 TABLET, FILM COATED ORAL at 21:14

## 2023-01-01 RX ADMIN — FLUDROCORTISONE ACETATE 0.2 MILLIGRAM(S): 0.1 TABLET ORAL at 06:12

## 2023-01-01 RX ADMIN — Medication 1 SPRAY(S): at 06:00

## 2023-01-01 RX ADMIN — FENTANYL CITRATE 25 MICROGRAM(S): 50 INJECTION INTRAVENOUS at 01:15

## 2023-01-01 RX ADMIN — INSULIN GLARGINE 10 UNIT(S): 100 INJECTION, SOLUTION SUBCUTANEOUS at 23:11

## 2023-01-01 RX ADMIN — Medication 1 PACKET(S): at 11:31

## 2023-01-01 RX ADMIN — Medication 2: at 06:59

## 2023-01-01 RX ADMIN — Medication 650 MILLIGRAM(S): at 23:00

## 2023-01-01 RX ADMIN — Medication 20 MILLIEQUIVALENT(S): at 08:48

## 2023-01-01 RX ADMIN — FENTANYL CITRATE 25 MICROGRAM(S): 50 INJECTION INTRAVENOUS at 09:34

## 2023-01-01 RX ADMIN — Medication 4: at 07:36

## 2023-01-01 RX ADMIN — SODIUM CHLORIDE 3 GRAM(S): 9 INJECTION INTRAMUSCULAR; INTRAVENOUS; SUBCUTANEOUS at 06:40

## 2023-01-01 RX ADMIN — ESCITALOPRAM OXALATE 5 MILLIGRAM(S): 10 TABLET, FILM COATED ORAL at 11:27

## 2023-01-01 RX ADMIN — Medication 4 MILLIGRAM(S): at 05:23

## 2023-01-01 RX ADMIN — Medication 1 SPRAY(S): at 18:23

## 2023-01-01 RX ADMIN — SODIUM CHLORIDE 2 GRAM(S): 9 INJECTION INTRAMUSCULAR; INTRAVENOUS; SUBCUTANEOUS at 17:36

## 2023-01-01 RX ADMIN — Medication 1 GRAM(S): at 17:43

## 2023-01-01 RX ADMIN — CHLORHEXIDINE GLUCONATE 1 APPLICATION(S): 213 SOLUTION TOPICAL at 05:08

## 2023-01-01 RX ADMIN — SODIUM CHLORIDE 3 GRAM(S): 9 INJECTION INTRAMUSCULAR; INTRAVENOUS; SUBCUTANEOUS at 06:59

## 2023-01-01 RX ADMIN — PIPERACILLIN AND TAZOBACTAM 25 GRAM(S): 4; .5 INJECTION, POWDER, LYOPHILIZED, FOR SOLUTION INTRAVENOUS at 21:40

## 2023-01-01 RX ADMIN — INSULIN HUMAN 2: 100 INJECTION, SOLUTION SUBCUTANEOUS at 11:13

## 2023-01-01 RX ADMIN — LEVETIRACETAM 1000 MILLIGRAM(S): 250 TABLET, FILM COATED ORAL at 18:00

## 2023-01-01 RX ADMIN — Medication 3 MILLILITER(S): at 05:13

## 2023-01-01 RX ADMIN — CHLORHEXIDINE GLUCONATE 1 APPLICATION(S): 213 SOLUTION TOPICAL at 06:02

## 2023-01-01 RX ADMIN — ENOXAPARIN SODIUM 70 MILLIGRAM(S): 100 INJECTION SUBCUTANEOUS at 17:31

## 2023-01-01 RX ADMIN — Medication 650 MILLIGRAM(S): at 07:10

## 2023-01-01 RX ADMIN — Medication 4 MILLIGRAM(S): at 05:58

## 2023-01-01 RX ADMIN — Medication 1 GRAM(S): at 17:54

## 2023-01-01 RX ADMIN — Medication 4: at 22:52

## 2023-01-01 RX ADMIN — Medication 25 MILLIGRAM(S): at 05:20

## 2023-01-01 RX ADMIN — CHLORHEXIDINE GLUCONATE 15 MILLILITER(S): 213 SOLUTION TOPICAL at 06:15

## 2023-01-01 RX ADMIN — Medication 4 MILLIGRAM(S): at 06:00

## 2023-01-01 RX ADMIN — Medication 1 GRAM(S): at 17:14

## 2023-01-01 RX ADMIN — Medication 12.5 GRAM(S): at 07:56

## 2023-01-01 RX ADMIN — CHLORHEXIDINE GLUCONATE 1 APPLICATION(S): 213 SOLUTION TOPICAL at 05:22

## 2023-01-01 RX ADMIN — MEROPENEM 280 MILLIGRAM(S): 1 INJECTION INTRAVENOUS at 22:20

## 2023-01-01 RX ADMIN — Medication 1 APPLICATION(S): at 15:05

## 2023-01-01 RX ADMIN — Medication 1 SPRAY(S): at 05:59

## 2023-01-01 RX ADMIN — Medication 25 MILLIGRAM(S): at 14:42

## 2023-01-01 RX ADMIN — Medication 1 GRAM(S): at 17:58

## 2023-01-01 RX ADMIN — Medication 10 MILLIGRAM(S): at 23:32

## 2023-01-01 RX ADMIN — BENZOCAINE AND MENTHOL 1 LOZENGE: 5; 1 LIQUID ORAL at 12:00

## 2023-01-01 RX ADMIN — PROPOFOL 21.8 MICROGRAM(S)/KG/MIN: 10 INJECTION, EMULSION INTRAVENOUS at 04:21

## 2023-01-01 RX ADMIN — Medication 1 SPRAY(S): at 18:47

## 2023-01-01 RX ADMIN — DEXMEDETOMIDINE HYDROCHLORIDE IN 0.9% SODIUM CHLORIDE 3.63 MICROGRAM(S)/KG/HR: 4 INJECTION INTRAVENOUS at 19:23

## 2023-01-01 RX ADMIN — SODIUM CHLORIDE 4 MILLILITER(S): 9 INJECTION INTRAMUSCULAR; INTRAVENOUS; SUBCUTANEOUS at 06:13

## 2023-01-01 RX ADMIN — Medication 2 MILLIGRAM(S): at 05:58

## 2023-01-01 RX ADMIN — SODIUM CHLORIDE 3 GRAM(S): 9 INJECTION INTRAMUSCULAR; INTRAVENOUS; SUBCUTANEOUS at 23:45

## 2023-01-01 RX ADMIN — Medication 100 MILLIEQUIVALENT(S): at 07:12

## 2023-01-01 RX ADMIN — PANTOPRAZOLE SODIUM 40 MILLIGRAM(S): 20 TABLET, DELAYED RELEASE ORAL at 05:58

## 2023-01-01 RX ADMIN — POTASSIUM PHOSPHATE, MONOBASIC POTASSIUM PHOSPHATE, DIBASIC 62.5 MILLIMOLE(S): 236; 224 INJECTION, SOLUTION INTRAVENOUS at 04:48

## 2023-01-01 RX ADMIN — Medication 2 MILLIGRAM(S): at 06:15

## 2023-01-01 RX ADMIN — MEROPENEM 100 MILLIGRAM(S): 1 INJECTION INTRAVENOUS at 21:45

## 2023-01-01 RX ADMIN — CHLORHEXIDINE GLUCONATE 15 MILLILITER(S): 213 SOLUTION TOPICAL at 05:20

## 2023-01-01 RX ADMIN — LACOSAMIDE 110 MILLIGRAM(S): 50 TABLET ORAL at 06:38

## 2023-01-01 RX ADMIN — Medication 650 MILLIGRAM(S): at 07:49

## 2023-01-01 RX ADMIN — SACUBITRIL AND VALSARTAN 1 TABLET(S): 24; 26 TABLET, FILM COATED ORAL at 05:05

## 2023-01-01 RX ADMIN — ATORVASTATIN CALCIUM 20 MILLIGRAM(S): 80 TABLET, FILM COATED ORAL at 23:34

## 2023-01-01 RX ADMIN — FENTANYL CITRATE 25 MICROGRAM(S): 50 INJECTION INTRAVENOUS at 12:13

## 2023-01-01 RX ADMIN — Medication 1 TABLET(S): at 11:38

## 2023-01-01 RX ADMIN — MAGNESIUM OXIDE 400 MG ORAL TABLET 400 MILLIGRAM(S): 241.3 TABLET ORAL at 18:13

## 2023-01-01 RX ADMIN — Medication 650 MILLIGRAM(S): at 13:45

## 2023-01-01 RX ADMIN — LEVETIRACETAM 1000 MILLIGRAM(S): 250 TABLET, FILM COATED ORAL at 06:30

## 2023-01-01 RX ADMIN — SODIUM CHLORIDE 3 GRAM(S): 9 INJECTION INTRAMUSCULAR; INTRAVENOUS; SUBCUTANEOUS at 19:02

## 2023-01-01 RX ADMIN — Medication 100 MILLIGRAM(S): at 14:30

## 2023-01-01 RX ADMIN — Medication 10 MILLIGRAM(S): at 15:38

## 2023-01-01 RX ADMIN — Medication 2 MILLIGRAM(S): at 17:32

## 2023-01-01 RX ADMIN — Medication 6 MILLIGRAM(S): at 21:26

## 2023-01-01 RX ADMIN — FENTANYL CITRATE 25 MICROGRAM(S): 50 INJECTION INTRAVENOUS at 04:30

## 2023-01-01 RX ADMIN — LEVETIRACETAM 400 MILLIGRAM(S): 250 TABLET, FILM COATED ORAL at 18:33

## 2023-01-01 RX ADMIN — Medication 40 MILLIEQUIVALENT(S): at 11:27

## 2023-01-01 RX ADMIN — Medication 1 GRAM(S): at 18:00

## 2023-01-01 RX ADMIN — SODIUM CHLORIDE 3 GRAM(S): 9 INJECTION INTRAMUSCULAR; INTRAVENOUS; SUBCUTANEOUS at 06:12

## 2023-01-01 RX ADMIN — SODIUM CHLORIDE 2 GRAM(S): 9 INJECTION INTRAMUSCULAR; INTRAVENOUS; SUBCUTANEOUS at 23:37

## 2023-01-01 RX ADMIN — Medication 2: at 13:21

## 2023-01-01 RX ADMIN — PROPOFOL 8.71 MICROGRAM(S)/KG/MIN: 10 INJECTION, EMULSION INTRAVENOUS at 08:15

## 2023-01-01 RX ADMIN — SODIUM CHLORIDE 2 GRAM(S): 9 INJECTION INTRAMUSCULAR; INTRAVENOUS; SUBCUTANEOUS at 06:24

## 2023-01-01 RX ADMIN — ENOXAPARIN SODIUM 75 MILLIGRAM(S): 100 INJECTION SUBCUTANEOUS at 18:33

## 2023-01-01 RX ADMIN — LEVETIRACETAM 750 MILLIGRAM(S): 250 TABLET, FILM COATED ORAL at 17:43

## 2023-01-01 RX ADMIN — Medication 5 MILLIGRAM(S): at 21:50

## 2023-01-01 RX ADMIN — Medication 40 MILLIEQUIVALENT(S): at 08:16

## 2023-01-01 RX ADMIN — SODIUM CHLORIDE 4 MILLILITER(S): 9 INJECTION INTRAMUSCULAR; INTRAVENOUS; SUBCUTANEOUS at 06:29

## 2023-01-01 RX ADMIN — Medication 1 SPRAY(S): at 06:58

## 2023-01-01 RX ADMIN — SODIUM CHLORIDE 500 MILLILITER(S): 9 INJECTION INTRAMUSCULAR; INTRAVENOUS; SUBCUTANEOUS at 18:31

## 2023-01-01 RX ADMIN — CHLORHEXIDINE GLUCONATE 15 MILLILITER(S): 213 SOLUTION TOPICAL at 17:50

## 2023-01-01 RX ADMIN — Medication 250 MILLIGRAM(S): at 14:22

## 2023-01-01 RX ADMIN — CEFEPIME 100 MILLIGRAM(S): 1 INJECTION, POWDER, FOR SOLUTION INTRAMUSCULAR; INTRAVENOUS at 17:47

## 2023-01-01 RX ADMIN — PROPOFOL 4.27 MICROGRAM(S)/KG/MIN: 10 INJECTION, EMULSION INTRAVENOUS at 17:05

## 2023-01-01 RX ADMIN — MIDODRINE HYDROCHLORIDE 15 MILLIGRAM(S): 2.5 TABLET ORAL at 15:04

## 2023-01-01 RX ADMIN — Medication 4 UNIT(S): at 08:06

## 2023-01-01 RX ADMIN — SODIUM CHLORIDE 3 GRAM(S): 9 INJECTION INTRAMUSCULAR; INTRAVENOUS; SUBCUTANEOUS at 05:39

## 2023-01-01 RX ADMIN — Medication 650 MILLIGRAM(S): at 13:35

## 2023-01-01 RX ADMIN — SODIUM CHLORIDE 3 GRAM(S): 9 INJECTION INTRAMUSCULAR; INTRAVENOUS; SUBCUTANEOUS at 17:40

## 2023-01-01 RX ADMIN — INSULIN HUMAN 4: 100 INJECTION, SOLUTION SUBCUTANEOUS at 23:26

## 2023-01-01 RX ADMIN — Medication 40 MILLIEQUIVALENT(S): at 07:49

## 2023-01-01 RX ADMIN — Medication 8: at 12:44

## 2023-01-01 RX ADMIN — CHLORHEXIDINE GLUCONATE 1 APPLICATION(S): 213 SOLUTION TOPICAL at 05:05

## 2023-01-01 RX ADMIN — CHLORHEXIDINE GLUCONATE 1 APPLICATION(S): 213 SOLUTION TOPICAL at 05:52

## 2023-01-01 RX ADMIN — DEXMEDETOMIDINE HYDROCHLORIDE IN 0.9% SODIUM CHLORIDE 3.63 MICROGRAM(S)/KG/HR: 4 INJECTION INTRAVENOUS at 08:45

## 2023-01-01 RX ADMIN — SODIUM CHLORIDE 75 MILLILITER(S): 9 INJECTION INTRAMUSCULAR; INTRAVENOUS; SUBCUTANEOUS at 15:55

## 2023-01-01 RX ADMIN — Medication 650 MILLIGRAM(S): at 09:00

## 2023-01-01 RX ADMIN — CEFEPIME 100 MILLIGRAM(S): 1 INJECTION, POWDER, FOR SOLUTION INTRAMUSCULAR; INTRAVENOUS at 10:23

## 2023-01-01 RX ADMIN — Medication 4: at 17:34

## 2023-01-01 RX ADMIN — SODIUM CHLORIDE 4 MILLILITER(S): 9 INJECTION INTRAMUSCULAR; INTRAVENOUS; SUBCUTANEOUS at 16:31

## 2023-01-01 RX ADMIN — Medication 1 SPRAY(S): at 18:19

## 2023-01-01 RX ADMIN — Medication 1 PACKET(S): at 18:12

## 2023-01-01 RX ADMIN — INSULIN HUMAN 3 UNIT(S)/HR: 100 INJECTION, SOLUTION SUBCUTANEOUS at 03:30

## 2023-01-01 RX ADMIN — LEVETIRACETAM 750 MILLIGRAM(S): 250 TABLET, FILM COATED ORAL at 07:08

## 2023-01-01 RX ADMIN — LEVETIRACETAM 750 MILLIGRAM(S): 250 TABLET, FILM COATED ORAL at 05:29

## 2023-01-01 RX ADMIN — Medication 1 PACKET(S): at 18:26

## 2023-01-01 RX ADMIN — Medication 4: at 11:33

## 2023-01-01 RX ADMIN — LEVETIRACETAM 750 MILLIGRAM(S): 250 TABLET, FILM COATED ORAL at 18:23

## 2023-01-01 RX ADMIN — SODIUM CHLORIDE 50 MILLILITER(S): 5 INJECTION, SOLUTION INTRAVENOUS at 03:16

## 2023-01-01 RX ADMIN — Medication 6.81 MICROGRAM(S)/KG/MIN: at 01:40

## 2023-01-01 RX ADMIN — ATORVASTATIN CALCIUM 20 MILLIGRAM(S): 80 TABLET, FILM COATED ORAL at 21:37

## 2023-01-01 RX ADMIN — FENTANYL CITRATE 25 MICROGRAM(S): 50 INJECTION INTRAVENOUS at 12:35

## 2023-01-01 RX ADMIN — FENTANYL CITRATE 50 MICROGRAM(S): 50 INJECTION INTRAVENOUS at 20:23

## 2023-01-01 RX ADMIN — Medication 650 MILLIGRAM(S): at 01:21

## 2023-01-01 RX ADMIN — Medication 1 SPRAY(S): at 05:36

## 2023-01-01 RX ADMIN — Medication 125 MILLILITER(S): at 08:54

## 2023-01-01 RX ADMIN — Medication 2 MILLIGRAM(S): at 05:52

## 2023-01-01 RX ADMIN — Medication 2: at 00:25

## 2023-01-01 RX ADMIN — SENNA PLUS 2 TABLET(S): 8.6 TABLET ORAL at 21:07

## 2023-01-01 RX ADMIN — Medication 100 MILLIGRAM(S): at 22:21

## 2023-01-01 RX ADMIN — ENOXAPARIN SODIUM 70 MILLIGRAM(S): 100 INJECTION SUBCUTANEOUS at 17:21

## 2023-01-01 RX ADMIN — Medication 1 GRAM(S): at 06:09

## 2023-01-01 RX ADMIN — CHLORHEXIDINE GLUCONATE 15 MILLILITER(S): 213 SOLUTION TOPICAL at 06:23

## 2023-01-01 RX ADMIN — PANTOPRAZOLE SODIUM 40 MILLIGRAM(S): 20 TABLET, DELAYED RELEASE ORAL at 18:47

## 2023-01-01 RX ADMIN — MIDODRINE HYDROCHLORIDE 15 MILLIGRAM(S): 2.5 TABLET ORAL at 16:29

## 2023-01-01 RX ADMIN — Medication 5 MILLIGRAM(S): at 22:43

## 2023-01-01 RX ADMIN — SODIUM CHLORIDE 3 GRAM(S): 9 INJECTION INTRAMUSCULAR; INTRAVENOUS; SUBCUTANEOUS at 11:27

## 2023-01-01 RX ADMIN — SODIUM CHLORIDE 1000 MILLILITER(S): 9 INJECTION INTRAMUSCULAR; INTRAVENOUS; SUBCUTANEOUS at 15:55

## 2023-01-01 RX ADMIN — Medication 5 MILLIGRAM(S): at 23:22

## 2023-01-01 RX ADMIN — ESCITALOPRAM OXALATE 5 MILLIGRAM(S): 10 TABLET, FILM COATED ORAL at 12:49

## 2023-01-01 RX ADMIN — LEVETIRACETAM 750 MILLIGRAM(S): 250 TABLET, FILM COATED ORAL at 17:23

## 2023-01-01 RX ADMIN — ATORVASTATIN CALCIUM 20 MILLIGRAM(S): 80 TABLET, FILM COATED ORAL at 22:16

## 2023-01-01 RX ADMIN — Medication 50 MILLILITER(S): at 03:30

## 2023-01-01 RX ADMIN — Medication 5 MILLIGRAM(S): at 22:36

## 2023-01-01 RX ADMIN — Medication 25 GRAM(S): at 07:57

## 2023-01-01 RX ADMIN — SODIUM CHLORIDE 4 MILLILITER(S): 9 INJECTION INTRAMUSCULAR; INTRAVENOUS; SUBCUTANEOUS at 11:57

## 2023-01-01 RX ADMIN — Medication 1 PACKET(S): at 10:33

## 2023-01-01 RX ADMIN — Medication 2 MILLIGRAM(S): at 06:09

## 2023-01-01 RX ADMIN — Medication 1 GRAM(S): at 06:30

## 2023-01-01 RX ADMIN — Medication 1 GRAM(S): at 13:43

## 2023-01-01 RX ADMIN — SODIUM CHLORIDE 30 MILLILITER(S): 5 INJECTION, SOLUTION INTRAVENOUS at 20:05

## 2023-01-01 RX ADMIN — MIDODRINE HYDROCHLORIDE 15 MILLIGRAM(S): 2.5 TABLET ORAL at 05:55

## 2023-01-01 RX ADMIN — Medication 1 SPRAY(S): at 17:40

## 2023-01-01 RX ADMIN — PHENYLEPHRINE HYDROCHLORIDE 2.72 MICROGRAM(S)/KG/MIN: 10 INJECTION INTRAVENOUS at 14:13

## 2023-01-01 RX ADMIN — LEVETIRACETAM 750 MILLIGRAM(S): 250 TABLET, FILM COATED ORAL at 18:51

## 2023-01-01 RX ADMIN — Medication 1 GRAM(S): at 05:04

## 2023-01-01 RX ADMIN — SODIUM CHLORIDE 3 GRAM(S): 9 INJECTION INTRAMUSCULAR; INTRAVENOUS; SUBCUTANEOUS at 14:14

## 2023-01-01 RX ADMIN — Medication 1 GRAM(S): at 06:40

## 2023-01-01 RX ADMIN — SODIUM CHLORIDE 4 MILLILITER(S): 9 INJECTION INTRAMUSCULAR; INTRAVENOUS; SUBCUTANEOUS at 17:20

## 2023-01-01 RX ADMIN — Medication 4 MILLIGRAM(S): at 05:16

## 2023-01-01 RX ADMIN — Medication 6.81 MICROGRAM(S)/KG/MIN: at 07:50

## 2023-01-01 RX ADMIN — Medication 250 MILLIGRAM(S): at 18:12

## 2023-01-01 RX ADMIN — Medication 25 MILLIGRAM(S): at 17:38

## 2023-01-01 RX ADMIN — SODIUM CHLORIDE 2 GRAM(S): 9 INJECTION INTRAMUSCULAR; INTRAVENOUS; SUBCUTANEOUS at 18:11

## 2023-01-01 RX ADMIN — Medication 4: at 21:32

## 2023-01-01 RX ADMIN — CHLORHEXIDINE GLUCONATE 1 APPLICATION(S): 213 SOLUTION TOPICAL at 05:53

## 2023-01-01 RX ADMIN — Medication 3.4 MICROGRAM(S)/KG/MIN: at 18:16

## 2023-01-01 RX ADMIN — POTASSIUM PHOSPHATE, MONOBASIC POTASSIUM PHOSPHATE, DIBASIC 83.33 MILLIMOLE(S): 236; 224 INJECTION, SOLUTION INTRAVENOUS at 15:10

## 2023-01-01 RX ADMIN — LEVETIRACETAM 750 MILLIGRAM(S): 250 TABLET, FILM COATED ORAL at 18:30

## 2023-01-01 RX ADMIN — Medication 4 MILLILITER(S): at 09:16

## 2023-01-01 RX ADMIN — Medication 10 MILLIGRAM(S): at 23:38

## 2023-01-01 RX ADMIN — Medication 100 MILLIEQUIVALENT(S): at 20:50

## 2023-01-01 RX ADMIN — INSULIN HUMAN 6 UNIT(S): 100 INJECTION, SOLUTION SUBCUTANEOUS at 06:54

## 2023-01-01 RX ADMIN — SODIUM CHLORIDE 25 MILLILITER(S): 5 INJECTION, SOLUTION INTRAVENOUS at 12:37

## 2023-01-01 RX ADMIN — CEFEPIME 100 MILLIGRAM(S): 1 INJECTION, POWDER, FOR SOLUTION INTRAMUSCULAR; INTRAVENOUS at 02:29

## 2023-01-01 RX ADMIN — ATORVASTATIN CALCIUM 20 MILLIGRAM(S): 80 TABLET, FILM COATED ORAL at 21:32

## 2023-01-01 RX ADMIN — Medication 1 SPRAY(S): at 17:30

## 2023-01-01 RX ADMIN — MEROPENEM 100 MILLIGRAM(S): 1 INJECTION INTRAVENOUS at 21:54

## 2023-01-01 RX ADMIN — Medication 4 MILLIGRAM(S): at 19:21

## 2023-01-01 RX ADMIN — Medication 3 MILLILITER(S): at 09:48

## 2023-01-01 RX ADMIN — Medication 40 MILLIGRAM(S): at 13:15

## 2023-01-01 RX ADMIN — PIPERACILLIN AND TAZOBACTAM 25 GRAM(S): 4; .5 INJECTION, POWDER, LYOPHILIZED, FOR SOLUTION INTRAVENOUS at 17:32

## 2023-01-01 RX ADMIN — SODIUM CHLORIDE 2 GRAM(S): 9 INJECTION INTRAMUSCULAR; INTRAVENOUS; SUBCUTANEOUS at 17:40

## 2023-01-01 RX ADMIN — Medication 650 MILLIGRAM(S): at 01:45

## 2023-01-01 RX ADMIN — SODIUM CHLORIDE 3 GRAM(S): 9 INJECTION INTRAMUSCULAR; INTRAVENOUS; SUBCUTANEOUS at 00:12

## 2023-01-01 RX ADMIN — Medication 4 MILLIGRAM(S): at 19:39

## 2023-01-01 RX ADMIN — ENOXAPARIN SODIUM 70 MILLIGRAM(S): 100 INJECTION SUBCUTANEOUS at 05:11

## 2023-01-01 RX ADMIN — Medication 2: at 06:40

## 2023-01-01 RX ADMIN — Medication 1 SPRAY(S): at 06:50

## 2023-01-01 RX ADMIN — Medication 3.4 MICROGRAM(S)/KG/MIN: at 07:16

## 2023-01-01 RX ADMIN — Medication 5 MILLIGRAM(S): at 21:29

## 2023-01-01 RX ADMIN — Medication 1 TABLET(S): at 21:24

## 2023-01-01 RX ADMIN — FENTANYL CITRATE 25 MICROGRAM(S): 50 INJECTION INTRAVENOUS at 15:50

## 2023-01-01 RX ADMIN — Medication 62.5 MILLIMOLE(S): at 09:51

## 2023-01-01 RX ADMIN — Medication 1 PACKET(S): at 16:18

## 2023-01-01 RX ADMIN — Medication 40 MILLIGRAM(S): at 23:24

## 2023-01-01 RX ADMIN — Medication 1 SPRAY(S): at 17:09

## 2023-01-01 RX ADMIN — PROPOFOL 21.8 MICROGRAM(S)/KG/MIN: 10 INJECTION, EMULSION INTRAVENOUS at 20:41

## 2023-01-01 RX ADMIN — Medication 400 MILLIGRAM(S): at 17:05

## 2023-01-01 RX ADMIN — Medication 4 MILLILITER(S): at 21:19

## 2023-01-01 RX ADMIN — Medication 1 APPLICATION(S): at 13:29

## 2023-01-01 RX ADMIN — Medication 100 MILLIGRAM(S): at 05:52

## 2023-01-01 RX ADMIN — Medication 1 APPLICATION(S): at 06:00

## 2023-01-01 RX ADMIN — ENOXAPARIN SODIUM 75 MILLIGRAM(S): 100 INJECTION SUBCUTANEOUS at 18:11

## 2023-01-01 RX ADMIN — Medication 3 MILLILITER(S): at 10:19

## 2023-01-01 RX ADMIN — ATORVASTATIN CALCIUM 20 MILLIGRAM(S): 80 TABLET, FILM COATED ORAL at 23:23

## 2023-01-01 RX ADMIN — Medication 1 PACKET(S): at 17:40

## 2023-01-01 RX ADMIN — Medication 3 MILLILITER(S): at 17:42

## 2023-01-01 RX ADMIN — Medication 1 SPRAY(S): at 05:38

## 2023-01-01 RX ADMIN — AMIODARONE HYDROCHLORIDE 400 MILLIGRAM(S): 400 TABLET ORAL at 05:57

## 2023-01-01 RX ADMIN — ATORVASTATIN CALCIUM 20 MILLIGRAM(S): 80 TABLET, FILM COATED ORAL at 21:26

## 2023-01-01 RX ADMIN — Medication 1 GRAM(S): at 11:45

## 2023-01-01 RX ADMIN — Medication 10 MILLIGRAM(S): at 13:21

## 2023-01-01 RX ADMIN — LEVETIRACETAM 750 MILLIGRAM(S): 250 TABLET, FILM COATED ORAL at 05:25

## 2023-01-01 RX ADMIN — Medication 2: at 22:59

## 2023-01-01 RX ADMIN — Medication 2: at 07:25

## 2023-01-01 RX ADMIN — Medication 20 MILLIEQUIVALENT(S): at 17:59

## 2023-01-01 RX ADMIN — SODIUM CHLORIDE 3 GRAM(S): 9 INJECTION INTRAMUSCULAR; INTRAVENOUS; SUBCUTANEOUS at 05:42

## 2023-01-01 RX ADMIN — PANTOPRAZOLE SODIUM 40 MILLIGRAM(S): 20 TABLET, DELAYED RELEASE ORAL at 06:43

## 2023-01-01 RX ADMIN — CHLORHEXIDINE GLUCONATE 1 APPLICATION(S): 213 SOLUTION TOPICAL at 06:00

## 2023-01-01 RX ADMIN — SODIUM CHLORIDE 3 GRAM(S): 9 INJECTION INTRAMUSCULAR; INTRAVENOUS; SUBCUTANEOUS at 06:01

## 2023-01-01 RX ADMIN — ESCITALOPRAM OXALATE 5 MILLIGRAM(S): 10 TABLET, FILM COATED ORAL at 13:43

## 2023-01-01 RX ADMIN — LACOSAMIDE 110 MILLIGRAM(S): 50 TABLET ORAL at 06:22

## 2023-01-01 RX ADMIN — ESCITALOPRAM OXALATE 5 MILLIGRAM(S): 10 TABLET, FILM COATED ORAL at 17:39

## 2023-01-01 RX ADMIN — PHENYLEPHRINE HYDROCHLORIDE 2.72 MICROGRAM(S)/KG/MIN: 10 INJECTION INTRAVENOUS at 02:21

## 2023-01-01 RX ADMIN — SODIUM CHLORIDE 3 GRAM(S): 9 INJECTION INTRAMUSCULAR; INTRAVENOUS; SUBCUTANEOUS at 12:33

## 2023-01-01 RX ADMIN — ATORVASTATIN CALCIUM 20 MILLIGRAM(S): 80 TABLET, FILM COATED ORAL at 23:00

## 2023-01-01 RX ADMIN — IRON SUCROSE 176.67 MILLIGRAM(S): 20 INJECTION, SOLUTION INTRAVENOUS at 20:44

## 2023-01-01 RX ADMIN — LEVETIRACETAM 750 MILLIGRAM(S): 250 TABLET, FILM COATED ORAL at 07:36

## 2023-01-01 RX ADMIN — Medication 650 MILLIGRAM(S): at 15:04

## 2023-01-01 RX ADMIN — Medication 650 MILLIGRAM(S): at 22:31

## 2023-01-01 RX ADMIN — CHLORHEXIDINE GLUCONATE 15 MILLILITER(S): 213 SOLUTION TOPICAL at 06:08

## 2023-01-01 RX ADMIN — Medication 10 MILLIGRAM(S): at 14:35

## 2023-01-01 RX ADMIN — Medication 2: at 12:00

## 2023-01-01 RX ADMIN — ESCITALOPRAM OXALATE 5 MILLIGRAM(S): 10 TABLET, FILM COATED ORAL at 11:49

## 2023-01-01 RX ADMIN — Medication 250 MILLIGRAM(S): at 06:23

## 2023-01-01 RX ADMIN — SODIUM CHLORIDE 3 GRAM(S): 9 INJECTION INTRAMUSCULAR; INTRAVENOUS; SUBCUTANEOUS at 17:43

## 2023-01-01 RX ADMIN — Medication 100 MILLIGRAM(S): at 13:21

## 2023-01-01 RX ADMIN — PIPERACILLIN AND TAZOBACTAM 25 GRAM(S): 4; .5 INJECTION, POWDER, LYOPHILIZED, FOR SOLUTION INTRAVENOUS at 05:19

## 2023-01-01 RX ADMIN — SODIUM CHLORIDE 3 GRAM(S): 9 INJECTION INTRAMUSCULAR; INTRAVENOUS; SUBCUTANEOUS at 23:21

## 2023-01-01 RX ADMIN — SODIUM CHLORIDE 1000 MILLILITER(S): 9 INJECTION INTRAMUSCULAR; INTRAVENOUS; SUBCUTANEOUS at 01:15

## 2023-01-01 RX ADMIN — LACOSAMIDE 110 MILLIGRAM(S): 50 TABLET ORAL at 06:02

## 2023-01-01 RX ADMIN — SODIUM CHLORIDE 3 GRAM(S): 9 INJECTION INTRAMUSCULAR; INTRAVENOUS; SUBCUTANEOUS at 17:36

## 2023-01-01 RX ADMIN — Medication 1 GRAM(S): at 06:14

## 2023-01-01 RX ADMIN — Medication 1 GRAM(S): at 17:23

## 2023-01-01 RX ADMIN — Medication 650 MILLIGRAM(S): at 13:59

## 2023-01-01 RX ADMIN — Medication 4 MILLIGRAM(S): at 17:43

## 2023-01-01 RX ADMIN — FENTANYL CITRATE 3.63 MICROGRAM(S)/KG/HR: 50 INJECTION INTRAVENOUS at 22:30

## 2023-01-01 RX ADMIN — ENOXAPARIN SODIUM 70 MILLIGRAM(S): 100 INJECTION SUBCUTANEOUS at 05:18

## 2023-01-01 RX ADMIN — ESCITALOPRAM OXALATE 5 MILLIGRAM(S): 10 TABLET, FILM COATED ORAL at 11:35

## 2023-01-01 RX ADMIN — CHLORHEXIDINE GLUCONATE 15 MILLILITER(S): 213 SOLUTION TOPICAL at 17:37

## 2023-01-01 RX ADMIN — ATORVASTATIN CALCIUM 20 MILLIGRAM(S): 80 TABLET, FILM COATED ORAL at 21:17

## 2023-01-01 RX ADMIN — Medication 1 APPLICATION(S): at 14:43

## 2023-01-01 RX ADMIN — FENTANYL CITRATE 25 MICROGRAM(S): 50 INJECTION INTRAVENOUS at 16:12

## 2023-01-01 RX ADMIN — SODIUM CHLORIDE 3 GRAM(S): 9 INJECTION INTRAMUSCULAR; INTRAVENOUS; SUBCUTANEOUS at 17:17

## 2023-01-01 RX ADMIN — CEFTRIAXONE 100 MILLIGRAM(S): 500 INJECTION, POWDER, FOR SOLUTION INTRAMUSCULAR; INTRAVENOUS at 10:28

## 2023-01-01 RX ADMIN — SODIUM CHLORIDE 4 MILLILITER(S): 9 INJECTION INTRAMUSCULAR; INTRAVENOUS; SUBCUTANEOUS at 22:14

## 2023-01-01 RX ADMIN — ATORVASTATIN CALCIUM 20 MILLIGRAM(S): 80 TABLET, FILM COATED ORAL at 22:20

## 2023-01-01 RX ADMIN — Medication 650 MILLIGRAM(S): at 08:56

## 2023-01-01 RX ADMIN — SENNA PLUS 2 TABLET(S): 8.6 TABLET ORAL at 22:29

## 2023-01-01 RX ADMIN — Medication 2 MILLIGRAM(S): at 06:26

## 2023-01-01 RX ADMIN — PANTOPRAZOLE SODIUM 40 MILLIGRAM(S): 20 TABLET, DELAYED RELEASE ORAL at 19:03

## 2023-01-01 RX ADMIN — Medication 6: at 23:01

## 2023-01-01 RX ADMIN — SACUBITRIL AND VALSARTAN 1 TABLET(S): 24; 26 TABLET, FILM COATED ORAL at 17:21

## 2023-01-01 RX ADMIN — INSULIN HUMAN 6 UNIT(S): 100 INJECTION, SOLUTION SUBCUTANEOUS at 11:37

## 2023-01-01 RX ADMIN — CHLORHEXIDINE GLUCONATE 15 MILLILITER(S): 213 SOLUTION TOPICAL at 06:52

## 2023-01-01 RX ADMIN — Medication 4 MILLILITER(S): at 17:20

## 2023-01-01 RX ADMIN — Medication 100 MILLIEQUIVALENT(S): at 10:32

## 2023-01-01 RX ADMIN — Medication 1 APPLICATION(S): at 13:31

## 2023-01-01 RX ADMIN — PROPOFOL 8.71 MICROGRAM(S)/KG/MIN: 10 INJECTION, EMULSION INTRAVENOUS at 01:44

## 2023-01-01 RX ADMIN — SODIUM CHLORIDE 1000 MILLILITER(S): 9 INJECTION INTRAMUSCULAR; INTRAVENOUS; SUBCUTANEOUS at 17:23

## 2023-01-01 RX ADMIN — SODIUM CHLORIDE 1000 MILLILITER(S): 9 INJECTION INTRAMUSCULAR; INTRAVENOUS; SUBCUTANEOUS at 16:46

## 2023-01-01 RX ADMIN — Medication 1 GRAM(S): at 05:11

## 2023-01-01 RX ADMIN — ATORVASTATIN CALCIUM 20 MILLIGRAM(S): 80 TABLET, FILM COATED ORAL at 22:10

## 2023-01-01 RX ADMIN — Medication 4: at 12:04

## 2023-01-01 RX ADMIN — Medication 50 MILLIGRAM(S): at 23:40

## 2023-01-01 RX ADMIN — Medication 1 PACKET(S): at 05:57

## 2023-01-01 RX ADMIN — CHLORHEXIDINE GLUCONATE 15 MILLILITER(S): 213 SOLUTION TOPICAL at 18:19

## 2023-01-01 RX ADMIN — Medication 12.5 MILLIGRAM(S): at 05:10

## 2023-01-01 RX ADMIN — AMIODARONE HYDROCHLORIDE 33.3 MG/MIN: 400 TABLET ORAL at 13:48

## 2023-01-01 RX ADMIN — SODIUM CHLORIDE 120 MILLILITER(S): 9 INJECTION INTRAMUSCULAR; INTRAVENOUS; SUBCUTANEOUS at 18:14

## 2023-01-01 RX ADMIN — Medication 25 GRAM(S): at 16:42

## 2023-01-01 RX ADMIN — Medication 3 MILLILITER(S): at 15:18

## 2023-01-01 RX ADMIN — SODIUM CHLORIDE 3 GRAM(S): 9 INJECTION INTRAMUSCULAR; INTRAVENOUS; SUBCUTANEOUS at 11:30

## 2023-01-01 RX ADMIN — CHLORHEXIDINE GLUCONATE 1 APPLICATION(S): 213 SOLUTION TOPICAL at 05:02

## 2023-01-01 RX ADMIN — Medication 40 MILLIGRAM(S): at 10:23

## 2023-01-01 RX ADMIN — Medication 1 APPLICATION(S): at 06:20

## 2023-01-01 RX ADMIN — SODIUM CHLORIDE 3 GRAM(S): 9 INJECTION INTRAMUSCULAR; INTRAVENOUS; SUBCUTANEOUS at 06:33

## 2023-01-01 RX ADMIN — Medication 20 MILLIEQUIVALENT(S): at 15:38

## 2023-01-01 RX ADMIN — SODIUM CHLORIDE 4 MILLILITER(S): 9 INJECTION INTRAMUSCULAR; INTRAVENOUS; SUBCUTANEOUS at 09:01

## 2023-01-01 RX ADMIN — Medication 1 GRAM(S): at 11:51

## 2023-01-01 RX ADMIN — PIPERACILLIN AND TAZOBACTAM 25 GRAM(S): 4; .5 INJECTION, POWDER, LYOPHILIZED, FOR SOLUTION INTRAVENOUS at 14:10

## 2023-01-01 RX ADMIN — FLUDROCORTISONE ACETATE 0.2 MILLIGRAM(S): 0.1 TABLET ORAL at 05:39

## 2023-01-01 RX ADMIN — PANTOPRAZOLE SODIUM 40 MILLIGRAM(S): 20 TABLET, DELAYED RELEASE ORAL at 05:17

## 2023-01-01 RX ADMIN — ATORVASTATIN CALCIUM 20 MILLIGRAM(S): 80 TABLET, FILM COATED ORAL at 22:47

## 2023-01-01 RX ADMIN — Medication 100 MILLIGRAM(S): at 16:00

## 2023-01-01 RX ADMIN — SODIUM CHLORIDE 3 GRAM(S): 9 INJECTION INTRAMUSCULAR; INTRAVENOUS; SUBCUTANEOUS at 17:51

## 2023-01-01 RX ADMIN — CHLORHEXIDINE GLUCONATE 15 MILLILITER(S): 213 SOLUTION TOPICAL at 06:02

## 2023-01-01 RX ADMIN — SODIUM CHLORIDE 3 GRAM(S): 9 INJECTION INTRAMUSCULAR; INTRAVENOUS; SUBCUTANEOUS at 18:33

## 2023-01-01 RX ADMIN — PROPOFOL 21.8 MICROGRAM(S)/KG/MIN: 10 INJECTION, EMULSION INTRAVENOUS at 23:53

## 2023-01-01 RX ADMIN — ATORVASTATIN CALCIUM 20 MILLIGRAM(S): 80 TABLET, FILM COATED ORAL at 21:56

## 2023-01-01 RX ADMIN — LEVETIRACETAM 400 MILLIGRAM(S): 250 TABLET, FILM COATED ORAL at 19:39

## 2023-01-01 RX ADMIN — SACUBITRIL AND VALSARTAN 1 TABLET(S): 24; 26 TABLET, FILM COATED ORAL at 18:23

## 2023-01-01 RX ADMIN — Medication 2: at 12:28

## 2023-01-01 RX ADMIN — CHLORHEXIDINE GLUCONATE 15 MILLILITER(S): 213 SOLUTION TOPICAL at 17:12

## 2023-01-01 RX ADMIN — SODIUM CHLORIDE 1000 MILLILITER(S): 9 INJECTION INTRAMUSCULAR; INTRAVENOUS; SUBCUTANEOUS at 13:40

## 2023-01-01 RX ADMIN — CHLORHEXIDINE GLUCONATE 15 MILLILITER(S): 213 SOLUTION TOPICAL at 05:40

## 2023-01-01 RX ADMIN — ENOXAPARIN SODIUM 70 MILLIGRAM(S): 100 INJECTION SUBCUTANEOUS at 21:10

## 2023-01-01 RX ADMIN — Medication 100 MILLIEQUIVALENT(S): at 06:50

## 2023-01-01 RX ADMIN — Medication 4 MILLILITER(S): at 16:46

## 2023-01-01 RX ADMIN — Medication 10 MILLIGRAM(S): at 14:11

## 2023-01-01 RX ADMIN — LEVETIRACETAM 750 MILLIGRAM(S): 250 TABLET, FILM COATED ORAL at 05:13

## 2023-01-01 RX ADMIN — Medication 4 MILLIGRAM(S): at 06:30

## 2023-01-01 RX ADMIN — Medication 40 MILLIEQUIVALENT(S): at 10:12

## 2023-01-01 RX ADMIN — ATORVASTATIN CALCIUM 20 MILLIGRAM(S): 80 TABLET, FILM COATED ORAL at 22:03

## 2023-01-01 RX ADMIN — PROPOFOL 8.71 MICROGRAM(S)/KG/MIN: 10 INJECTION, EMULSION INTRAVENOUS at 02:34

## 2023-01-01 RX ADMIN — INSULIN GLARGINE 10 UNIT(S): 100 INJECTION, SOLUTION SUBCUTANEOUS at 22:52

## 2023-01-01 RX ADMIN — ATORVASTATIN CALCIUM 20 MILLIGRAM(S): 80 TABLET, FILM COATED ORAL at 21:25

## 2023-01-01 RX ADMIN — CHLORHEXIDINE GLUCONATE 1 APPLICATION(S): 213 SOLUTION TOPICAL at 05:54

## 2023-01-01 RX ADMIN — Medication 125 MILLILITER(S): at 16:29

## 2023-01-01 RX ADMIN — CEFEPIME 100 MILLIGRAM(S): 1 INJECTION, POWDER, FOR SOLUTION INTRAMUSCULAR; INTRAVENOUS at 01:06

## 2023-01-01 RX ADMIN — SODIUM CHLORIDE 3 GRAM(S): 9 INJECTION INTRAMUSCULAR; INTRAVENOUS; SUBCUTANEOUS at 23:09

## 2023-01-01 RX ADMIN — Medication 1000 MILLIGRAM(S): at 23:20

## 2023-01-01 RX ADMIN — FENTANYL CITRATE 25 MICROGRAM(S): 50 INJECTION INTRAVENOUS at 15:45

## 2023-01-01 RX ADMIN — SODIUM CHLORIDE 3 GRAM(S): 9 INJECTION INTRAMUSCULAR; INTRAVENOUS; SUBCUTANEOUS at 12:14

## 2023-01-01 RX ADMIN — CEFEPIME 100 MILLIGRAM(S): 1 INJECTION, POWDER, FOR SOLUTION INTRAMUSCULAR; INTRAVENOUS at 18:30

## 2023-01-01 RX ADMIN — Medication 250 MILLIGRAM(S): at 10:13

## 2023-01-01 RX ADMIN — CHLORHEXIDINE GLUCONATE 15 MILLILITER(S): 213 SOLUTION TOPICAL at 05:54

## 2023-01-01 RX ADMIN — Medication 5 MILLIGRAM(S): at 22:46

## 2023-01-01 RX ADMIN — Medication 4 MILLILITER(S): at 05:34

## 2023-01-01 RX ADMIN — SODIUM CHLORIDE 3 GRAM(S): 9 INJECTION INTRAMUSCULAR; INTRAVENOUS; SUBCUTANEOUS at 05:16

## 2023-01-01 RX ADMIN — Medication 3 MILLILITER(S): at 10:49

## 2023-01-01 RX ADMIN — SODIUM CHLORIDE 3 GRAM(S): 9 INJECTION INTRAMUSCULAR; INTRAVENOUS; SUBCUTANEOUS at 05:51

## 2023-01-01 RX ADMIN — PANTOPRAZOLE SODIUM 40 MILLIGRAM(S): 20 TABLET, DELAYED RELEASE ORAL at 06:12

## 2023-01-01 RX ADMIN — SODIUM CHLORIDE 2 GRAM(S): 9 INJECTION INTRAMUSCULAR; INTRAVENOUS; SUBCUTANEOUS at 11:49

## 2023-01-01 RX ADMIN — Medication 100 MILLIEQUIVALENT(S): at 09:34

## 2023-01-01 RX ADMIN — PROPOFOL 21.8 MICROGRAM(S)/KG/MIN: 10 INJECTION, EMULSION INTRAVENOUS at 10:32

## 2023-01-01 RX ADMIN — Medication 2 MILLIGRAM(S): at 17:59

## 2023-01-01 RX ADMIN — MEROPENEM 100 MILLIGRAM(S): 1 INJECTION INTRAVENOUS at 15:54

## 2023-01-01 RX ADMIN — Medication 123 MILLIGRAM(S): at 22:28

## 2023-01-01 RX ADMIN — Medication 2 TABLET(S): at 06:08

## 2023-01-01 RX ADMIN — Medication 4: at 22:36

## 2023-01-01 RX ADMIN — SODIUM CHLORIDE 50 MILLILITER(S): 5 INJECTION, SOLUTION INTRAVENOUS at 14:43

## 2023-01-01 RX ADMIN — LEVETIRACETAM 400 MILLIGRAM(S): 250 TABLET, FILM COATED ORAL at 17:05

## 2023-01-01 RX ADMIN — INSULIN GLARGINE 5 UNIT(S): 100 INJECTION, SOLUTION SUBCUTANEOUS at 21:54

## 2023-01-01 RX ADMIN — Medication 1 GRAM(S): at 19:39

## 2023-01-01 RX ADMIN — Medication 1 SPRAY(S): at 06:12

## 2023-01-01 RX ADMIN — Medication 3 MILLILITER(S): at 21:41

## 2023-01-01 RX ADMIN — Medication 5 MILLIGRAM(S): at 21:39

## 2023-01-01 RX ADMIN — ESCITALOPRAM OXALATE 5 MILLIGRAM(S): 10 TABLET, FILM COATED ORAL at 11:24

## 2023-01-01 RX ADMIN — Medication 100 MILLIGRAM(S): at 22:16

## 2023-01-01 RX ADMIN — Medication 1 SPRAY(S): at 17:46

## 2023-01-01 RX ADMIN — SODIUM CHLORIDE 3 GRAM(S): 9 INJECTION INTRAMUSCULAR; INTRAVENOUS; SUBCUTANEOUS at 11:45

## 2023-01-01 RX ADMIN — LEVETIRACETAM 750 MILLIGRAM(S): 250 TABLET, FILM COATED ORAL at 18:13

## 2023-01-01 RX ADMIN — Medication 2: at 06:45

## 2023-01-01 RX ADMIN — Medication 1 GRAM(S): at 07:00

## 2023-01-01 RX ADMIN — CHLORHEXIDINE GLUCONATE 1 APPLICATION(S): 213 SOLUTION TOPICAL at 07:38

## 2023-01-01 RX ADMIN — SODIUM CHLORIDE 3 GRAM(S): 9 INJECTION INTRAMUSCULAR; INTRAVENOUS; SUBCUTANEOUS at 23:38

## 2023-01-01 RX ADMIN — Medication 1 GRAM(S): at 17:03

## 2023-01-01 RX ADMIN — SODIUM CHLORIDE 4 MILLILITER(S): 9 INJECTION INTRAMUSCULAR; INTRAVENOUS; SUBCUTANEOUS at 09:48

## 2023-01-01 RX ADMIN — Medication 1 GRAM(S): at 18:18

## 2023-01-01 RX ADMIN — Medication 1 SPRAY(S): at 17:05

## 2023-01-01 RX ADMIN — Medication 1 GRAM(S): at 23:29

## 2023-01-01 RX ADMIN — SODIUM CHLORIDE 250 MILLILITER(S): 9 INJECTION INTRAMUSCULAR; INTRAVENOUS; SUBCUTANEOUS at 11:44

## 2023-01-01 RX ADMIN — LACOSAMIDE 110 MILLIGRAM(S): 50 TABLET ORAL at 18:38

## 2023-01-01 RX ADMIN — Medication 102 MILLIGRAM(S): at 12:01

## 2023-01-01 RX ADMIN — Medication 1 GRAM(S): at 06:49

## 2023-01-01 RX ADMIN — ESCITALOPRAM OXALATE 5 MILLIGRAM(S): 10 TABLET, FILM COATED ORAL at 11:47

## 2023-01-01 RX ADMIN — PANTOPRAZOLE SODIUM 10 MG/HR: 20 TABLET, DELAYED RELEASE ORAL at 02:33

## 2023-01-01 RX ADMIN — Medication 25 MILLIGRAM(S): at 05:55

## 2023-01-01 RX ADMIN — FLUDROCORTISONE ACETATE 0.2 MILLIGRAM(S): 0.1 TABLET ORAL at 05:28

## 2023-01-01 RX ADMIN — Medication 4: at 21:28

## 2023-01-01 RX ADMIN — Medication 1 TABLET(S): at 11:24

## 2023-01-01 RX ADMIN — Medication 40 MILLIEQUIVALENT(S): at 07:08

## 2023-01-01 RX ADMIN — Medication 25 MILLIGRAM(S): at 05:25

## 2023-01-01 RX ADMIN — ATORVASTATIN CALCIUM 20 MILLIGRAM(S): 80 TABLET, FILM COATED ORAL at 22:07

## 2023-01-01 RX ADMIN — FLUDROCORTISONE ACETATE 0.2 MILLIGRAM(S): 0.1 TABLET ORAL at 05:04

## 2023-01-01 RX ADMIN — SODIUM CHLORIDE 3 GRAM(S): 9 INJECTION INTRAMUSCULAR; INTRAVENOUS; SUBCUTANEOUS at 05:35

## 2023-01-01 RX ADMIN — Medication 400 MILLIGRAM(S): at 13:02

## 2023-01-01 RX ADMIN — Medication 6: at 17:49

## 2023-01-01 RX ADMIN — Medication 1000 MILLIGRAM(S): at 13:32

## 2023-01-01 RX ADMIN — ESCITALOPRAM OXALATE 5 MILLIGRAM(S): 10 TABLET, FILM COATED ORAL at 12:17

## 2023-01-01 RX ADMIN — ATORVASTATIN CALCIUM 20 MILLIGRAM(S): 80 TABLET, FILM COATED ORAL at 21:40

## 2023-01-01 RX ADMIN — Medication 4 MILLIGRAM(S): at 17:46

## 2023-01-01 RX ADMIN — Medication 40 MILLIGRAM(S): at 16:53

## 2023-01-01 RX ADMIN — CEFEPIME 100 MILLIGRAM(S): 1 INJECTION, POWDER, FOR SOLUTION INTRAMUSCULAR; INTRAVENOUS at 01:47

## 2023-01-01 RX ADMIN — SENNA PLUS 2 TABLET(S): 8.6 TABLET ORAL at 23:23

## 2023-01-01 RX ADMIN — INSULIN HUMAN 5 UNIT(S): 100 INJECTION, SOLUTION SUBCUTANEOUS at 16:36

## 2023-01-01 RX ADMIN — SODIUM CHLORIDE 3 GRAM(S): 9 INJECTION INTRAMUSCULAR; INTRAVENOUS; SUBCUTANEOUS at 23:36

## 2023-01-01 RX ADMIN — LEVETIRACETAM 750 MILLIGRAM(S): 250 TABLET, FILM COATED ORAL at 17:00

## 2023-01-01 RX ADMIN — ESCITALOPRAM OXALATE 5 MILLIGRAM(S): 10 TABLET, FILM COATED ORAL at 11:16

## 2023-01-01 RX ADMIN — Medication 25 GRAM(S): at 03:58

## 2023-01-01 RX ADMIN — LACOSAMIDE 110 MILLIGRAM(S): 50 TABLET ORAL at 18:14

## 2023-01-01 RX ADMIN — Medication 1 PACKET(S): at 06:21

## 2023-01-01 RX ADMIN — SODIUM CHLORIDE 75 MILLILITER(S): 5 INJECTION, SOLUTION INTRAVENOUS at 18:58

## 2023-01-01 RX ADMIN — SODIUM CHLORIDE 2 GRAM(S): 9 INJECTION INTRAMUSCULAR; INTRAVENOUS; SUBCUTANEOUS at 05:47

## 2023-01-01 RX ADMIN — HUMAN INSULIN 10 UNIT(S): 100 INJECTION, SUSPENSION SUBCUTANEOUS at 12:52

## 2023-01-01 RX ADMIN — Medication 15 GRAM(S): at 17:45

## 2023-01-01 RX ADMIN — CEFEPIME 100 MILLIGRAM(S): 1 INJECTION, POWDER, FOR SOLUTION INTRAMUSCULAR; INTRAVENOUS at 23:22

## 2023-01-01 RX ADMIN — LEVETIRACETAM 750 MILLIGRAM(S): 250 TABLET, FILM COATED ORAL at 17:40

## 2023-01-01 RX ADMIN — LEVETIRACETAM 750 MILLIGRAM(S): 250 TABLET, FILM COATED ORAL at 17:49

## 2023-01-01 RX ADMIN — Medication 25 GRAM(S): at 11:35

## 2023-01-01 RX ADMIN — Medication 1 APPLICATION(S): at 21:50

## 2023-01-01 RX ADMIN — Medication 4 MILLILITER(S): at 20:21

## 2023-01-01 RX ADMIN — LEVETIRACETAM 750 MILLIGRAM(S): 250 TABLET, FILM COATED ORAL at 05:28

## 2023-01-01 RX ADMIN — Medication 4 UNIT(S): at 12:15

## 2023-01-01 RX ADMIN — LEVETIRACETAM 750 MILLIGRAM(S): 250 TABLET, FILM COATED ORAL at 17:06

## 2023-01-01 RX ADMIN — LEVETIRACETAM 750 MILLIGRAM(S): 250 TABLET, FILM COATED ORAL at 05:03

## 2023-01-01 RX ADMIN — SODIUM CHLORIDE 2 GRAM(S): 9 INJECTION INTRAMUSCULAR; INTRAVENOUS; SUBCUTANEOUS at 15:51

## 2023-01-01 RX ADMIN — SODIUM CHLORIDE 2 GRAM(S): 9 INJECTION INTRAMUSCULAR; INTRAVENOUS; SUBCUTANEOUS at 11:57

## 2023-01-01 RX ADMIN — ENOXAPARIN SODIUM 75 MILLIGRAM(S): 100 INJECTION SUBCUTANEOUS at 17:44

## 2023-01-01 RX ADMIN — Medication 62.5 MILLIMOLE(S): at 10:40

## 2023-01-01 RX ADMIN — Medication 1 GRAM(S): at 05:51

## 2023-01-01 RX ADMIN — SODIUM CHLORIDE 2 GRAM(S): 9 INJECTION INTRAMUSCULAR; INTRAVENOUS; SUBCUTANEOUS at 11:33

## 2023-01-01 RX ADMIN — Medication 25 GRAM(S): at 12:36

## 2023-01-01 RX ADMIN — Medication 1 SPRAY(S): at 05:17

## 2023-01-01 RX ADMIN — ATORVASTATIN CALCIUM 20 MILLIGRAM(S): 80 TABLET, FILM COATED ORAL at 21:29

## 2023-01-01 RX ADMIN — Medication 40 MILLIEQUIVALENT(S): at 23:33

## 2023-01-01 RX ADMIN — Medication 3 MILLILITER(S): at 22:14

## 2023-01-01 RX ADMIN — SODIUM CHLORIDE 4 MILLILITER(S): 9 INJECTION INTRAMUSCULAR; INTRAVENOUS; SUBCUTANEOUS at 21:14

## 2023-01-01 RX ADMIN — Medication 3 MILLILITER(S): at 04:47

## 2023-01-01 RX ADMIN — SODIUM CHLORIDE 4 MILLILITER(S): 9 INJECTION INTRAMUSCULAR; INTRAVENOUS; SUBCUTANEOUS at 21:42

## 2023-01-01 RX ADMIN — Medication 2: at 23:27

## 2023-01-01 RX ADMIN — Medication 25 GRAM(S): at 07:46

## 2023-01-01 RX ADMIN — ATORVASTATIN CALCIUM 20 MILLIGRAM(S): 80 TABLET, FILM COATED ORAL at 22:52

## 2023-01-01 RX ADMIN — INSULIN GLARGINE 18 UNIT(S): 100 INJECTION, SOLUTION SUBCUTANEOUS at 22:44

## 2023-01-01 RX ADMIN — Medication 5 MILLIGRAM(S): at 22:52

## 2023-01-01 RX ADMIN — ESCITALOPRAM OXALATE 5 MILLIGRAM(S): 10 TABLET, FILM COATED ORAL at 11:59

## 2023-01-01 RX ADMIN — ENOXAPARIN SODIUM 75 MILLIGRAM(S): 100 INJECTION SUBCUTANEOUS at 07:37

## 2023-01-01 RX ADMIN — PANTOPRAZOLE SODIUM 40 MILLIGRAM(S): 20 TABLET, DELAYED RELEASE ORAL at 05:49

## 2023-01-01 RX ADMIN — Medication 4: at 18:28

## 2023-01-01 RX ADMIN — Medication 4 MILLIGRAM(S): at 18:51

## 2023-01-01 RX ADMIN — Medication 3 MILLILITER(S): at 21:04

## 2023-01-01 RX ADMIN — PANTOPRAZOLE SODIUM 40 MILLIGRAM(S): 20 TABLET, DELAYED RELEASE ORAL at 05:54

## 2023-01-01 RX ADMIN — Medication 2 MILLIGRAM(S): at 05:42

## 2023-01-01 RX ADMIN — FLUDROCORTISONE ACETATE 0.2 MILLIGRAM(S): 0.1 TABLET ORAL at 05:09

## 2023-01-01 RX ADMIN — ESCITALOPRAM OXALATE 5 MILLIGRAM(S): 10 TABLET, FILM COATED ORAL at 11:22

## 2023-01-01 RX ADMIN — LEVETIRACETAM 400 MILLIGRAM(S): 250 TABLET, FILM COATED ORAL at 06:35

## 2023-01-01 RX ADMIN — Medication 100 MILLIEQUIVALENT(S): at 19:54

## 2023-01-01 RX ADMIN — ESCITALOPRAM OXALATE 5 MILLIGRAM(S): 10 TABLET, FILM COATED ORAL at 11:28

## 2023-01-01 RX ADMIN — Medication 2: at 07:12

## 2023-01-01 RX ADMIN — CEFEPIME 100 MILLIGRAM(S): 1 INJECTION, POWDER, FOR SOLUTION INTRAMUSCULAR; INTRAVENOUS at 10:10

## 2023-01-01 RX ADMIN — ESCITALOPRAM OXALATE 5 MILLIGRAM(S): 10 TABLET, FILM COATED ORAL at 12:00

## 2023-01-01 RX ADMIN — PANTOPRAZOLE SODIUM 10 MG/HR: 20 TABLET, DELAYED RELEASE ORAL at 22:28

## 2023-01-01 RX ADMIN — Medication 5 MILLIGRAM(S): at 00:46

## 2023-01-01 RX ADMIN — Medication 4 UNIT(S): at 09:46

## 2023-01-01 RX ADMIN — Medication 1 GRAM(S): at 18:14

## 2023-01-01 RX ADMIN — Medication 15 GRAM(S): at 05:17

## 2023-01-01 RX ADMIN — Medication 100 MILLIEQUIVALENT(S): at 23:16

## 2023-01-01 RX ADMIN — Medication 5 MILLIGRAM(S): at 21:07

## 2023-01-01 RX ADMIN — CHLORHEXIDINE GLUCONATE 15 MILLILITER(S): 213 SOLUTION TOPICAL at 06:21

## 2023-01-01 RX ADMIN — Medication 400 MILLIGRAM(S): at 03:07

## 2023-01-01 RX ADMIN — Medication 3 MILLILITER(S): at 09:01

## 2023-01-01 RX ADMIN — Medication 1 GRAM(S): at 17:06

## 2023-01-01 RX ADMIN — CEFEPIME 100 MILLIGRAM(S): 1 INJECTION, POWDER, FOR SOLUTION INTRAMUSCULAR; INTRAVENOUS at 09:00

## 2023-01-01 RX ADMIN — CHLORHEXIDINE GLUCONATE 1 APPLICATION(S): 213 SOLUTION TOPICAL at 05:09

## 2023-01-01 RX ADMIN — ATORVASTATIN CALCIUM 20 MILLIGRAM(S): 80 TABLET, FILM COATED ORAL at 22:43

## 2023-01-01 RX ADMIN — Medication 5 MILLIGRAM(S): at 21:56

## 2023-01-01 RX ADMIN — Medication 25 GRAM(S): at 11:27

## 2023-01-01 RX ADMIN — Medication 3 MILLILITER(S): at 09:14

## 2023-01-01 RX ADMIN — SODIUM CHLORIDE 3 GRAM(S): 9 INJECTION INTRAMUSCULAR; INTRAVENOUS; SUBCUTANEOUS at 11:51

## 2023-01-01 RX ADMIN — Medication 650 MILLIGRAM(S): at 23:51

## 2023-01-01 RX ADMIN — LEVETIRACETAM 750 MILLIGRAM(S): 250 TABLET, FILM COATED ORAL at 05:16

## 2023-01-01 RX ADMIN — Medication 650 MILLIGRAM(S): at 14:44

## 2023-01-01 RX ADMIN — Medication 1 PACKET(S): at 14:10

## 2023-01-01 RX ADMIN — MIDODRINE HYDROCHLORIDE 15 MILLIGRAM(S): 2.5 TABLET ORAL at 19:36

## 2023-01-01 RX ADMIN — Medication 100 MILLIEQUIVALENT(S): at 21:57

## 2023-01-01 RX ADMIN — Medication 1 GRAM(S): at 06:45

## 2023-01-01 RX ADMIN — SODIUM CHLORIDE 3 GRAM(S): 9 INJECTION INTRAMUSCULAR; INTRAVENOUS; SUBCUTANEOUS at 16:05

## 2023-01-01 RX ADMIN — Medication 4 UNIT(S): at 17:46

## 2023-01-01 RX ADMIN — AMIODARONE HYDROCHLORIDE 600 MILLIGRAM(S): 400 TABLET ORAL at 13:28

## 2023-01-01 RX ADMIN — Medication 4 MILLILITER(S): at 10:32

## 2023-01-01 RX ADMIN — Medication 100 MILLIEQUIVALENT(S): at 13:19

## 2023-01-01 RX ADMIN — SODIUM CHLORIDE 20 MILLILITER(S): 9 INJECTION INTRAMUSCULAR; INTRAVENOUS; SUBCUTANEOUS at 15:59

## 2023-01-01 RX ADMIN — Medication 10 MILLIGRAM(S): at 14:42

## 2023-01-01 RX ADMIN — Medication 4: at 15:58

## 2023-01-01 RX ADMIN — Medication 50 MILLIGRAM(S): at 05:18

## 2023-01-01 RX ADMIN — Medication 2: at 23:48

## 2023-01-01 RX ADMIN — CHLORHEXIDINE GLUCONATE 15 MILLILITER(S): 213 SOLUTION TOPICAL at 17:53

## 2023-01-01 RX ADMIN — Medication 20 MILLIEQUIVALENT(S): at 19:01

## 2023-01-01 RX ADMIN — LEVETIRACETAM 750 MILLIGRAM(S): 250 TABLET, FILM COATED ORAL at 06:17

## 2023-01-01 RX ADMIN — SACUBITRIL AND VALSARTAN 1 TABLET(S): 24; 26 TABLET, FILM COATED ORAL at 17:20

## 2023-01-01 RX ADMIN — ENOXAPARIN SODIUM 70 MILLIGRAM(S): 100 INJECTION SUBCUTANEOUS at 17:05

## 2023-01-01 RX ADMIN — LEVETIRACETAM 1000 MILLIGRAM(S): 250 TABLET, FILM COATED ORAL at 17:16

## 2023-01-01 RX ADMIN — ATORVASTATIN CALCIUM 20 MILLIGRAM(S): 80 TABLET, FILM COATED ORAL at 22:45

## 2023-01-01 RX ADMIN — FLUDROCORTISONE ACETATE 0.2 MILLIGRAM(S): 0.1 TABLET ORAL at 05:16

## 2023-01-01 RX ADMIN — POTASSIUM PHOSPHATE, MONOBASIC POTASSIUM PHOSPHATE, DIBASIC 62.5 MILLIMOLE(S): 236; 224 INJECTION, SOLUTION INTRAVENOUS at 21:54

## 2023-01-01 RX ADMIN — Medication 4 UNIT(S): at 18:10

## 2023-01-01 RX ADMIN — Medication 100 GRAM(S): at 07:41

## 2023-01-01 RX ADMIN — INSULIN GLARGINE 18 UNIT(S): 100 INJECTION, SOLUTION SUBCUTANEOUS at 22:12

## 2023-01-01 RX ADMIN — INSULIN HUMAN 6 UNIT(S): 100 INJECTION, SOLUTION SUBCUTANEOUS at 13:09

## 2023-01-01 RX ADMIN — Medication 1 SPRAY(S): at 05:45

## 2023-01-01 RX ADMIN — Medication 3 MILLILITER(S): at 18:04

## 2023-01-01 RX ADMIN — SODIUM CHLORIDE 3 GRAM(S): 9 INJECTION INTRAMUSCULAR; INTRAVENOUS; SUBCUTANEOUS at 19:05

## 2023-01-01 RX ADMIN — Medication 1 GRAM(S): at 17:16

## 2023-01-01 RX ADMIN — Medication 100 MILLIEQUIVALENT(S): at 08:59

## 2023-01-01 RX ADMIN — Medication 1 GRAM(S): at 11:38

## 2023-01-01 RX ADMIN — LEVETIRACETAM 750 MILLIGRAM(S): 250 TABLET, FILM COATED ORAL at 06:40

## 2023-01-01 RX ADMIN — Medication 1 APPLICATION(S): at 11:27

## 2023-01-01 RX ADMIN — PROPOFOL 21.8 MICROGRAM(S)/KG/MIN: 10 INJECTION, EMULSION INTRAVENOUS at 01:57

## 2023-01-01 RX ADMIN — SODIUM CHLORIDE 3 GRAM(S): 9 INJECTION INTRAMUSCULAR; INTRAVENOUS; SUBCUTANEOUS at 12:30

## 2023-01-01 RX ADMIN — LACOSAMIDE 110 MILLIGRAM(S): 50 TABLET ORAL at 18:54

## 2023-01-01 RX ADMIN — Medication 4: at 22:42

## 2023-01-01 RX ADMIN — SODIUM CHLORIDE 3 GRAM(S): 9 INJECTION INTRAMUSCULAR; INTRAVENOUS; SUBCUTANEOUS at 05:28

## 2023-01-01 RX ADMIN — Medication 25 GRAM(S): at 07:08

## 2023-01-01 RX ADMIN — Medication 4 MILLILITER(S): at 16:31

## 2023-01-01 RX ADMIN — Medication 400 MILLIGRAM(S): at 09:41

## 2023-01-01 RX ADMIN — Medication 650 MILLIGRAM(S): at 12:44

## 2023-01-01 RX ADMIN — MEROPENEM 280 MILLIGRAM(S): 1 INJECTION INTRAVENOUS at 06:08

## 2023-01-01 RX ADMIN — Medication 2: at 18:53

## 2023-01-01 RX ADMIN — CHLORHEXIDINE GLUCONATE 15 MILLILITER(S): 213 SOLUTION TOPICAL at 05:09

## 2023-01-01 RX ADMIN — SODIUM CHLORIDE 75 MILLILITER(S): 5 INJECTION, SOLUTION INTRAVENOUS at 03:10

## 2023-01-01 RX ADMIN — LACOSAMIDE 110 MILLIGRAM(S): 50 TABLET ORAL at 19:05

## 2023-01-01 RX ADMIN — Medication 2: at 06:31

## 2023-01-01 RX ADMIN — SODIUM CHLORIDE 3 GRAM(S): 9 INJECTION INTRAMUSCULAR; INTRAVENOUS; SUBCUTANEOUS at 06:31

## 2023-01-01 RX ADMIN — LEVETIRACETAM 400 MILLIGRAM(S): 250 TABLET, FILM COATED ORAL at 21:12

## 2023-01-01 RX ADMIN — Medication 4 MILLILITER(S): at 05:42

## 2023-01-01 RX ADMIN — Medication 650 MILLIGRAM(S): at 16:00

## 2023-01-01 RX ADMIN — Medication 10 MILLIGRAM(S): at 05:18

## 2023-01-01 RX ADMIN — Medication 50 MILLIEQUIVALENT(S): at 00:16

## 2023-01-01 RX ADMIN — Medication 1 SPRAY(S): at 17:04

## 2023-01-01 RX ADMIN — CHLORHEXIDINE GLUCONATE 15 MILLILITER(S): 213 SOLUTION TOPICAL at 17:02

## 2023-01-01 RX ADMIN — INSULIN GLARGINE 20 UNIT(S): 100 INJECTION, SOLUTION SUBCUTANEOUS at 21:26

## 2023-01-01 RX ADMIN — LEVETIRACETAM 400 MILLIGRAM(S): 250 TABLET, FILM COATED ORAL at 18:01

## 2023-01-01 RX ADMIN — LEVETIRACETAM 750 MILLIGRAM(S): 250 TABLET, FILM COATED ORAL at 18:36

## 2023-01-01 RX ADMIN — Medication 25 GRAM(S): at 00:52

## 2023-01-01 RX ADMIN — POTASSIUM PHOSPHATE, MONOBASIC POTASSIUM PHOSPHATE, DIBASIC 62.5 MILLIMOLE(S): 236; 224 INJECTION, SOLUTION INTRAVENOUS at 11:24

## 2023-01-01 RX ADMIN — VASOPRESSIN 6 UNIT(S)/MIN: 20 INJECTION INTRAVENOUS at 18:40

## 2023-01-01 RX ADMIN — Medication 85 MILLIMOLE(S): at 11:29

## 2023-01-01 RX ADMIN — Medication 650 MILLIGRAM(S): at 21:59

## 2023-01-01 RX ADMIN — Medication 1 TABLET(S): at 07:56

## 2023-01-01 RX ADMIN — LACOSAMIDE 110 MILLIGRAM(S): 50 TABLET ORAL at 05:58

## 2023-01-01 RX ADMIN — CHLORHEXIDINE GLUCONATE 15 MILLILITER(S): 213 SOLUTION TOPICAL at 17:59

## 2023-01-01 RX ADMIN — Medication 3.4 MICROGRAM(S)/KG/MIN: at 02:18

## 2023-01-01 RX ADMIN — ENOXAPARIN SODIUM 40 MILLIGRAM(S): 100 INJECTION SUBCUTANEOUS at 21:37

## 2023-01-01 RX ADMIN — FENTANYL CITRATE 25 MICROGRAM(S): 50 INJECTION INTRAVENOUS at 04:08

## 2023-01-01 RX ADMIN — SODIUM CHLORIDE 3 GRAM(S): 9 INJECTION INTRAMUSCULAR; INTRAVENOUS; SUBCUTANEOUS at 18:51

## 2023-01-01 RX ADMIN — Medication 1 GRAM(S): at 23:45

## 2023-01-01 RX ADMIN — LEVETIRACETAM 400 MILLIGRAM(S): 250 TABLET, FILM COATED ORAL at 06:24

## 2023-01-01 RX ADMIN — INSULIN GLARGINE 14 UNIT(S): 100 INJECTION, SOLUTION SUBCUTANEOUS at 22:28

## 2023-01-01 RX ADMIN — LEVETIRACETAM 750 MILLIGRAM(S): 250 TABLET, FILM COATED ORAL at 05:04

## 2023-01-01 RX ADMIN — PIPERACILLIN AND TAZOBACTAM 25 GRAM(S): 4; .5 INJECTION, POWDER, LYOPHILIZED, FOR SOLUTION INTRAVENOUS at 23:50

## 2023-01-01 RX ADMIN — Medication 1 GRAM(S): at 11:47

## 2023-01-01 RX ADMIN — PANTOPRAZOLE SODIUM 40 MILLIGRAM(S): 20 TABLET, DELAYED RELEASE ORAL at 17:23

## 2023-01-01 RX ADMIN — Medication 1 GRAM(S): at 23:36

## 2023-01-01 RX ADMIN — CHLORHEXIDINE GLUCONATE 15 MILLILITER(S): 213 SOLUTION TOPICAL at 17:07

## 2023-01-01 RX ADMIN — Medication 5 MILLIGRAM(S): at 22:07

## 2023-01-01 RX ADMIN — SACUBITRIL AND VALSARTAN 1 TABLET(S): 24; 26 TABLET, FILM COATED ORAL at 18:53

## 2023-01-01 RX ADMIN — Medication 1 TABLET(S): at 23:49

## 2023-01-01 RX ADMIN — ATORVASTATIN CALCIUM 20 MILLIGRAM(S): 80 TABLET, FILM COATED ORAL at 21:47

## 2023-01-01 RX ADMIN — Medication 25 GRAM(S): at 08:16

## 2023-01-01 RX ADMIN — Medication 3 MILLILITER(S): at 00:41

## 2023-01-01 RX ADMIN — PANTOPRAZOLE SODIUM 40 MILLIGRAM(S): 20 TABLET, DELAYED RELEASE ORAL at 05:19

## 2023-01-01 RX ADMIN — INSULIN HUMAN 1: 100 INJECTION, SOLUTION SUBCUTANEOUS at 05:23

## 2023-01-01 RX ADMIN — Medication 2 MILLIGRAM(S): at 19:03

## 2023-01-01 RX ADMIN — Medication 4 MILLIGRAM(S): at 05:09

## 2023-01-01 RX ADMIN — PANTOPRAZOLE SODIUM 10 MG/HR: 20 TABLET, DELAYED RELEASE ORAL at 18:41

## 2023-01-01 RX ADMIN — ENOXAPARIN SODIUM 70 MILLIGRAM(S): 100 INJECTION SUBCUTANEOUS at 05:36

## 2023-01-01 RX ADMIN — Medication 100 MILLIEQUIVALENT(S): at 23:25

## 2023-01-01 RX ADMIN — SODIUM CHLORIDE 2 GRAM(S): 9 INJECTION INTRAMUSCULAR; INTRAVENOUS; SUBCUTANEOUS at 23:32

## 2023-01-01 RX ADMIN — Medication 100 MILLIEQUIVALENT(S): at 04:39

## 2023-01-01 RX ADMIN — Medication 10 MILLIGRAM(S): at 18:07

## 2023-01-01 RX ADMIN — SODIUM CHLORIDE 3 GRAM(S): 9 INJECTION INTRAMUSCULAR; INTRAVENOUS; SUBCUTANEOUS at 11:48

## 2023-01-01 RX ADMIN — SODIUM CHLORIDE 2 GRAM(S): 9 INJECTION INTRAMUSCULAR; INTRAVENOUS; SUBCUTANEOUS at 18:12

## 2023-01-01 RX ADMIN — Medication 3 MILLILITER(S): at 16:17

## 2023-01-01 RX ADMIN — Medication 1 APPLICATION(S): at 05:22

## 2023-01-01 RX ADMIN — Medication 100 MILLIGRAM(S): at 21:22

## 2023-01-01 RX ADMIN — CISATRACURIUM BESYLATE 13.1 MICROGRAM(S)/KG/MIN: 2 INJECTION INTRAVENOUS at 06:01

## 2023-01-01 RX ADMIN — Medication 4: at 16:51

## 2023-01-01 RX ADMIN — CHLORHEXIDINE GLUCONATE 1 APPLICATION(S): 213 SOLUTION TOPICAL at 05:56

## 2023-01-01 RX ADMIN — ATORVASTATIN CALCIUM 20 MILLIGRAM(S): 80 TABLET, FILM COATED ORAL at 21:44

## 2023-01-01 RX ADMIN — INSULIN GLARGINE 18 UNIT(S): 100 INJECTION, SOLUTION SUBCUTANEOUS at 22:59

## 2023-01-01 RX ADMIN — Medication 6.81 MICROGRAM(S)/KG/MIN: at 22:24

## 2023-01-01 RX ADMIN — Medication 4: at 12:10

## 2023-01-01 RX ADMIN — INSULIN GLARGINE 20 UNIT(S): 100 INJECTION, SOLUTION SUBCUTANEOUS at 22:25

## 2023-01-01 RX ADMIN — CEFEPIME 100 MILLIGRAM(S): 1 INJECTION, POWDER, FOR SOLUTION INTRAMUSCULAR; INTRAVENOUS at 18:14

## 2023-01-01 RX ADMIN — SENNA PLUS 2 TABLET(S): 8.6 TABLET ORAL at 22:43

## 2023-01-01 RX ADMIN — PHENYLEPHRINE HYDROCHLORIDE 45.4 MICROGRAM(S)/KG/MIN: 10 INJECTION INTRAVENOUS at 12:02

## 2023-01-01 RX ADMIN — CHLORHEXIDINE GLUCONATE 15 MILLILITER(S): 213 SOLUTION TOPICAL at 05:57

## 2023-01-01 RX ADMIN — Medication 40 MILLIEQUIVALENT(S): at 08:53

## 2023-01-01 RX ADMIN — ESCITALOPRAM OXALATE 5 MILLIGRAM(S): 10 TABLET, FILM COATED ORAL at 16:00

## 2023-01-01 RX ADMIN — Medication 1 SPRAY(S): at 18:24

## 2023-01-01 RX ADMIN — ESCITALOPRAM OXALATE 5 MILLIGRAM(S): 10 TABLET, FILM COATED ORAL at 11:33

## 2023-01-01 RX ADMIN — Medication 1 GRAM(S): at 17:46

## 2023-01-01 RX ADMIN — MIDODRINE HYDROCHLORIDE 15 MILLIGRAM(S): 2.5 TABLET ORAL at 14:44

## 2023-01-01 RX ADMIN — PHENYLEPHRINE HYDROCHLORIDE 2.72 MICROGRAM(S)/KG/MIN: 10 INJECTION INTRAVENOUS at 15:45

## 2023-01-01 RX ADMIN — Medication 1 APPLICATION(S): at 11:53

## 2023-01-01 RX ADMIN — SODIUM CHLORIDE 2 GRAM(S): 9 INJECTION INTRAMUSCULAR; INTRAVENOUS; SUBCUTANEOUS at 17:43

## 2023-01-01 RX ADMIN — CHLORHEXIDINE GLUCONATE 1 APPLICATION(S): 213 SOLUTION TOPICAL at 05:40

## 2023-01-01 RX ADMIN — Medication 1 TABLET(S): at 12:30

## 2023-01-01 RX ADMIN — SODIUM CHLORIDE 3 GRAM(S): 9 INJECTION INTRAMUSCULAR; INTRAVENOUS; SUBCUTANEOUS at 17:11

## 2023-01-01 RX ADMIN — SODIUM CHLORIDE 4 MILLILITER(S): 9 INJECTION INTRAMUSCULAR; INTRAVENOUS; SUBCUTANEOUS at 21:05

## 2023-01-01 RX ADMIN — SODIUM CHLORIDE 3 GRAM(S): 9 INJECTION INTRAMUSCULAR; INTRAVENOUS; SUBCUTANEOUS at 05:54

## 2023-01-01 RX ADMIN — Medication 4 MILLIGRAM(S): at 06:33

## 2023-01-01 RX ADMIN — ATORVASTATIN CALCIUM 20 MILLIGRAM(S): 80 TABLET, FILM COATED ORAL at 21:28

## 2023-01-01 RX ADMIN — Medication 62.5 MILLIMOLE(S): at 23:24

## 2023-01-01 RX ADMIN — Medication 4: at 12:34

## 2023-01-01 RX ADMIN — SODIUM CHLORIDE 3 GRAM(S): 9 INJECTION INTRAMUSCULAR; INTRAVENOUS; SUBCUTANEOUS at 12:48

## 2023-01-01 RX ADMIN — SENNA PLUS 2 TABLET(S): 8.6 TABLET ORAL at 23:21

## 2023-01-01 RX ADMIN — Medication 4 MILLILITER(S): at 09:01

## 2023-01-01 RX ADMIN — Medication 4 MILLIGRAM(S): at 06:59

## 2023-01-01 RX ADMIN — Medication 1 SPRAY(S): at 05:03

## 2023-01-01 RX ADMIN — Medication 1 SPRAY(S): at 06:11

## 2023-01-01 RX ADMIN — Medication 20 MILLIEQUIVALENT(S): at 11:25

## 2023-01-01 RX ADMIN — PANTOPRAZOLE SODIUM 40 MILLIGRAM(S): 20 TABLET, DELAYED RELEASE ORAL at 18:02

## 2023-01-01 RX ADMIN — SODIUM CHLORIDE 3 GRAM(S): 9 INJECTION INTRAMUSCULAR; INTRAVENOUS; SUBCUTANEOUS at 13:43

## 2023-01-01 RX ADMIN — ATORVASTATIN CALCIUM 20 MILLIGRAM(S): 80 TABLET, FILM COATED ORAL at 21:54

## 2023-01-01 RX ADMIN — IOHEXOL 30 MILLILITER(S): 300 INJECTION, SOLUTION INTRAVENOUS at 10:04

## 2023-01-01 RX ADMIN — Medication 2: at 07:48

## 2023-01-01 RX ADMIN — FENTANYL CITRATE 25 MICROGRAM(S): 50 INJECTION INTRAVENOUS at 01:30

## 2023-01-01 RX ADMIN — SODIUM CHLORIDE 2 GRAM(S): 9 INJECTION INTRAMUSCULAR; INTRAVENOUS; SUBCUTANEOUS at 11:40

## 2023-01-01 RX ADMIN — SACUBITRIL AND VALSARTAN 1 TABLET(S): 24; 26 TABLET, FILM COATED ORAL at 17:54

## 2023-01-01 RX ADMIN — Medication 1 GRAM(S): at 06:46

## 2023-01-01 RX ADMIN — Medication 1 SPRAY(S): at 17:41

## 2023-01-01 RX ADMIN — CEFTRIAXONE 100 MILLIGRAM(S): 500 INJECTION, POWDER, FOR SOLUTION INTRAMUSCULAR; INTRAVENOUS at 11:24

## 2023-01-01 RX ADMIN — Medication 2: at 17:45

## 2023-01-01 RX ADMIN — Medication 1000 MILLIGRAM(S): at 16:30

## 2023-01-01 RX ADMIN — LEVETIRACETAM 400 MILLIGRAM(S): 250 TABLET, FILM COATED ORAL at 05:58

## 2023-01-01 RX ADMIN — ATORVASTATIN CALCIUM 20 MILLIGRAM(S): 80 TABLET, FILM COATED ORAL at 22:13

## 2023-01-01 RX ADMIN — LACOSAMIDE 110 MILLIGRAM(S): 50 TABLET ORAL at 06:57

## 2023-01-01 RX ADMIN — Medication 4 MILLIGRAM(S): at 17:38

## 2023-01-01 RX ADMIN — Medication 6: at 11:34

## 2023-01-01 RX ADMIN — Medication 1 APPLICATION(S): at 14:00

## 2023-01-01 RX ADMIN — SODIUM CHLORIDE 1000 MILLILITER(S): 9 INJECTION INTRAMUSCULAR; INTRAVENOUS; SUBCUTANEOUS at 17:24

## 2023-01-01 RX ADMIN — LACOSAMIDE 110 MILLIGRAM(S): 50 TABLET ORAL at 19:00

## 2023-01-01 RX ADMIN — LEVETIRACETAM 750 MILLIGRAM(S): 250 TABLET, FILM COATED ORAL at 05:37

## 2023-01-01 RX ADMIN — Medication 1 SPRAY(S): at 17:22

## 2023-01-01 RX ADMIN — PIPERACILLIN AND TAZOBACTAM 200 GRAM(S): 4; .5 INJECTION, POWDER, LYOPHILIZED, FOR SOLUTION INTRAVENOUS at 17:23

## 2023-01-01 RX ADMIN — Medication 3 MILLILITER(S): at 06:04

## 2023-01-01 RX ADMIN — Medication 1 GRAM(S): at 23:38

## 2023-01-01 RX ADMIN — Medication 4 MILLILITER(S): at 00:40

## 2023-01-01 RX ADMIN — Medication 20 MILLIGRAM(S): at 04:49

## 2023-01-01 RX ADMIN — PIPERACILLIN AND TAZOBACTAM 25 GRAM(S): 4; .5 INJECTION, POWDER, LYOPHILIZED, FOR SOLUTION INTRAVENOUS at 06:47

## 2023-01-01 RX ADMIN — Medication 1 SPRAY(S): at 17:51

## 2023-01-01 RX ADMIN — Medication 25 MILLIGRAM(S): at 17:03

## 2023-01-01 RX ADMIN — PANTOPRAZOLE SODIUM 40 MILLIGRAM(S): 20 TABLET, DELAYED RELEASE ORAL at 17:32

## 2023-01-01 RX ADMIN — Medication 400 MILLIGRAM(S): at 17:56

## 2023-01-01 RX ADMIN — PROPOFOL 21.8 MICROGRAM(S)/KG/MIN: 10 INJECTION, EMULSION INTRAVENOUS at 23:49

## 2023-01-01 RX ADMIN — ESCITALOPRAM OXALATE 5 MILLIGRAM(S): 10 TABLET, FILM COATED ORAL at 11:57

## 2023-01-01 RX ADMIN — LACOSAMIDE 110 MILLIGRAM(S): 50 TABLET ORAL at 06:01

## 2023-01-01 RX ADMIN — Medication 1 GRAM(S): at 23:26

## 2023-01-01 RX ADMIN — Medication 2: at 23:30

## 2023-01-01 RX ADMIN — INSULIN HUMAN 4: 100 INJECTION, SOLUTION SUBCUTANEOUS at 16:36

## 2023-01-01 RX ADMIN — Medication 4 MILLIGRAM(S): at 05:59

## 2023-01-01 RX ADMIN — Medication 1 GRAM(S): at 06:15

## 2023-01-01 RX ADMIN — AMIODARONE HYDROCHLORIDE 33.3 MG/MIN: 400 TABLET ORAL at 11:27

## 2023-01-01 RX ADMIN — Medication 1 SPRAY(S): at 05:42

## 2023-01-01 RX ADMIN — SODIUM CHLORIDE 500 MILLILITER(S): 9 INJECTION INTRAMUSCULAR; INTRAVENOUS; SUBCUTANEOUS at 08:11

## 2023-01-01 RX ADMIN — Medication 2 MILLIGRAM(S): at 05:40

## 2023-01-01 RX ADMIN — Medication 40 MILLIGRAM(S): at 17:31

## 2023-01-01 RX ADMIN — CHLORHEXIDINE GLUCONATE 15 MILLILITER(S): 213 SOLUTION TOPICAL at 18:54

## 2023-01-01 RX ADMIN — PANTOPRAZOLE SODIUM 40 MILLIGRAM(S): 20 TABLET, DELAYED RELEASE ORAL at 17:49

## 2023-01-01 RX ADMIN — ATORVASTATIN CALCIUM 20 MILLIGRAM(S): 80 TABLET, FILM COATED ORAL at 22:44

## 2023-01-01 RX ADMIN — Medication 2: at 06:46

## 2023-01-01 RX ADMIN — Medication 4 MILLIGRAM(S): at 17:09

## 2023-01-01 RX ADMIN — Medication 2 MILLIGRAM(S): at 05:54

## 2023-01-01 RX ADMIN — CHLORHEXIDINE GLUCONATE 1 APPLICATION(S): 213 SOLUTION TOPICAL at 06:22

## 2023-01-01 RX ADMIN — PIPERACILLIN AND TAZOBACTAM 25 GRAM(S): 4; .5 INJECTION, POWDER, LYOPHILIZED, FOR SOLUTION INTRAVENOUS at 14:42

## 2023-01-01 RX ADMIN — CHLORHEXIDINE GLUCONATE 15 MILLILITER(S): 213 SOLUTION TOPICAL at 17:00

## 2023-01-01 RX ADMIN — ESCITALOPRAM OXALATE 5 MILLIGRAM(S): 10 TABLET, FILM COATED ORAL at 11:48

## 2023-01-01 RX ADMIN — Medication 6: at 17:38

## 2023-01-01 RX ADMIN — Medication 1 SPRAY(S): at 17:39

## 2023-01-01 RX ADMIN — Medication 4 MILLIGRAM(S): at 18:33

## 2023-01-01 RX ADMIN — SODIUM CHLORIDE 4 MILLILITER(S): 9 INJECTION INTRAMUSCULAR; INTRAVENOUS; SUBCUTANEOUS at 06:35

## 2023-01-01 RX ADMIN — Medication 1 SPRAY(S): at 17:42

## 2023-01-01 RX ADMIN — INSULIN GLARGINE 10 UNIT(S): 100 INJECTION, SOLUTION SUBCUTANEOUS at 21:57

## 2023-01-01 RX ADMIN — Medication 1 SPRAY(S): at 05:37

## 2023-01-01 RX ADMIN — SODIUM CHLORIDE 3 GRAM(S): 9 INJECTION INTRAMUSCULAR; INTRAVENOUS; SUBCUTANEOUS at 11:47

## 2023-01-01 RX ADMIN — Medication 2: at 22:36

## 2023-01-01 RX ADMIN — INSULIN HUMAN 2: 100 INJECTION, SOLUTION SUBCUTANEOUS at 06:10

## 2023-01-01 RX ADMIN — PANTOPRAZOLE SODIUM 40 MILLIGRAM(S): 20 TABLET, DELAYED RELEASE ORAL at 12:16

## 2023-01-01 RX ADMIN — Medication 50 MILLIGRAM(S): at 05:13

## 2023-01-01 RX ADMIN — Medication 4 MILLILITER(S): at 21:41

## 2023-01-01 RX ADMIN — Medication 1 GRAM(S): at 05:25

## 2023-01-01 RX ADMIN — Medication 1 TABLET(S): at 11:52

## 2023-01-01 RX ADMIN — PROPOFOL 4.27 MICROGRAM(S)/KG/MIN: 10 INJECTION, EMULSION INTRAVENOUS at 04:13

## 2023-01-01 RX ADMIN — Medication 650 MILLIGRAM(S): at 15:16

## 2023-01-01 RX ADMIN — Medication 100 MILLIEQUIVALENT(S): at 20:58

## 2023-01-01 RX ADMIN — ONDANSETRON 4 MILLIGRAM(S): 8 TABLET, FILM COATED ORAL at 14:35

## 2023-01-01 RX ADMIN — Medication 4 MILLILITER(S): at 12:51

## 2023-01-01 RX ADMIN — SODIUM CHLORIDE 3 GRAM(S): 9 INJECTION INTRAMUSCULAR; INTRAVENOUS; SUBCUTANEOUS at 17:37

## 2023-01-01 RX ADMIN — LEVETIRACETAM 750 MILLIGRAM(S): 250 TABLET, FILM COATED ORAL at 06:43

## 2023-01-01 RX ADMIN — SENNA PLUS 2 TABLET(S): 8.6 TABLET ORAL at 21:58

## 2023-01-01 RX ADMIN — Medication 5 MILLIGRAM(S): at 21:30

## 2023-01-01 RX ADMIN — Medication 3 MILLILITER(S): at 23:17

## 2023-01-01 RX ADMIN — ESCITALOPRAM OXALATE 5 MILLIGRAM(S): 10 TABLET, FILM COATED ORAL at 11:23

## 2023-01-01 RX ADMIN — Medication 100 MILLIEQUIVALENT(S): at 04:21

## 2023-01-01 RX ADMIN — SODIUM CHLORIDE 3 GRAM(S): 9 INJECTION INTRAMUSCULAR; INTRAVENOUS; SUBCUTANEOUS at 23:04

## 2023-01-01 RX ADMIN — ESCITALOPRAM OXALATE 5 MILLIGRAM(S): 10 TABLET, FILM COATED ORAL at 11:11

## 2023-01-01 RX ADMIN — Medication 1 GRAM(S): at 05:39

## 2023-01-01 RX ADMIN — ENOXAPARIN SODIUM 40 MILLIGRAM(S): 100 INJECTION SUBCUTANEOUS at 22:45

## 2023-01-01 RX ADMIN — Medication 3 MILLILITER(S): at 20:21

## 2023-01-01 RX ADMIN — LEVETIRACETAM 750 MILLIGRAM(S): 250 TABLET, FILM COATED ORAL at 18:11

## 2023-01-01 RX ADMIN — SACUBITRIL AND VALSARTAN 1 TABLET(S): 24; 26 TABLET, FILM COATED ORAL at 17:37

## 2023-01-01 RX ADMIN — VASOPRESSIN 6 UNIT(S)/MIN: 20 INJECTION INTRAVENOUS at 04:06

## 2023-01-01 RX ADMIN — Medication 650 MILLIGRAM(S): at 02:21

## 2023-01-01 RX ADMIN — POLYETHYLENE GLYCOL 3350 17 GRAM(S): 17 POWDER, FOR SOLUTION ORAL at 11:36

## 2023-01-01 RX ADMIN — Medication 4 MILLILITER(S): at 16:30

## 2023-01-01 RX ADMIN — Medication 1 SPRAY(S): at 05:18

## 2023-01-01 RX ADMIN — Medication 2: at 07:36

## 2023-01-01 RX ADMIN — Medication 100 MILLIEQUIVALENT(S): at 11:18

## 2023-01-01 RX ADMIN — Medication 100 MILLIEQUIVALENT(S): at 10:43

## 2023-01-01 RX ADMIN — Medication 4: at 06:20

## 2023-01-01 RX ADMIN — LEVETIRACETAM 400 MILLIGRAM(S): 250 TABLET, FILM COATED ORAL at 18:19

## 2023-01-01 RX ADMIN — Medication 5 MILLIGRAM(S): at 22:10

## 2023-01-01 RX ADMIN — PANTOPRAZOLE SODIUM 40 MILLIGRAM(S): 20 TABLET, DELAYED RELEASE ORAL at 05:18

## 2023-01-01 RX ADMIN — HYDROMORPHONE HYDROCHLORIDE 0.5 MILLIGRAM(S): 2 INJECTION INTRAMUSCULAR; INTRAVENOUS; SUBCUTANEOUS at 01:39

## 2023-01-01 RX ADMIN — CHLORHEXIDINE GLUCONATE 1 APPLICATION(S): 213 SOLUTION TOPICAL at 05:57

## 2023-01-01 RX ADMIN — Medication 4 MILLIGRAM(S): at 06:40

## 2023-01-01 RX ADMIN — SODIUM CHLORIDE 2 GRAM(S): 9 INJECTION INTRAMUSCULAR; INTRAVENOUS; SUBCUTANEOUS at 23:12

## 2023-01-01 RX ADMIN — MEROPENEM 100 MILLIGRAM(S): 1 INJECTION INTRAVENOUS at 06:23

## 2023-01-01 RX ADMIN — SODIUM CHLORIDE 1000 MILLILITER(S): 9 INJECTION INTRAMUSCULAR; INTRAVENOUS; SUBCUTANEOUS at 17:13

## 2023-01-01 RX ADMIN — Medication 40 MILLIEQUIVALENT(S): at 07:24

## 2023-01-01 RX ADMIN — Medication 100 GRAM(S): at 09:00

## 2023-01-01 RX ADMIN — Medication 1 SPRAY(S): at 05:49

## 2023-01-01 RX ADMIN — Medication 40 MILLIEQUIVALENT(S): at 07:30

## 2023-01-01 RX ADMIN — SODIUM CHLORIDE 3 GRAM(S): 9 INJECTION INTRAMUSCULAR; INTRAVENOUS; SUBCUTANEOUS at 12:00

## 2023-01-01 RX ADMIN — CHLORHEXIDINE GLUCONATE 1 APPLICATION(S): 213 SOLUTION TOPICAL at 05:27

## 2023-01-01 RX ADMIN — Medication 1 TABLET(S): at 07:41

## 2023-01-01 RX ADMIN — Medication 75 MEQ/KG/HR: at 12:36

## 2023-01-01 RX ADMIN — SENNA PLUS 2 TABLET(S): 8.6 TABLET ORAL at 23:32

## 2023-01-01 RX ADMIN — Medication 1 GRAM(S): at 05:17

## 2023-01-01 RX ADMIN — PANTOPRAZOLE SODIUM 40 MILLIGRAM(S): 20 TABLET, DELAYED RELEASE ORAL at 07:18

## 2023-01-01 RX ADMIN — LEVETIRACETAM 750 MILLIGRAM(S): 250 TABLET, FILM COATED ORAL at 05:18

## 2023-01-01 RX ADMIN — SODIUM CHLORIDE 3 GRAM(S): 9 INJECTION INTRAMUSCULAR; INTRAVENOUS; SUBCUTANEOUS at 22:41

## 2023-01-01 RX ADMIN — Medication 1 SPRAY(S): at 18:06

## 2023-01-01 RX ADMIN — Medication 250 MILLIGRAM(S): at 19:30

## 2023-01-01 RX ADMIN — SENNA PLUS 2 TABLET(S): 8.6 TABLET ORAL at 22:45

## 2023-01-01 RX ADMIN — Medication 4: at 06:59

## 2023-01-01 RX ADMIN — Medication 4 MILLILITER(S): at 06:14

## 2023-01-01 RX ADMIN — LACOSAMIDE 110 MILLIGRAM(S): 50 TABLET ORAL at 06:46

## 2023-01-01 RX ADMIN — SODIUM CHLORIDE 3 GRAM(S): 9 INJECTION INTRAMUSCULAR; INTRAVENOUS; SUBCUTANEOUS at 06:09

## 2023-01-01 RX ADMIN — SODIUM CHLORIDE 2 GRAM(S): 9 INJECTION INTRAMUSCULAR; INTRAVENOUS; SUBCUTANEOUS at 17:56

## 2023-01-01 RX ADMIN — Medication 2: at 12:29

## 2023-01-01 RX ADMIN — Medication 650 MILLIGRAM(S): at 07:57

## 2023-01-01 RX ADMIN — PIPERACILLIN AND TAZOBACTAM 25 GRAM(S): 4; .5 INJECTION, POWDER, LYOPHILIZED, FOR SOLUTION INTRAVENOUS at 13:39

## 2023-01-01 RX ADMIN — ESCITALOPRAM OXALATE 5 MILLIGRAM(S): 10 TABLET, FILM COATED ORAL at 11:55

## 2023-01-01 RX ADMIN — Medication 102 MILLIGRAM(S): at 00:14

## 2023-01-01 RX ADMIN — SODIUM CHLORIDE 2 GRAM(S): 9 INJECTION INTRAMUSCULAR; INTRAVENOUS; SUBCUTANEOUS at 05:17

## 2023-01-01 RX ADMIN — Medication 4: at 06:27

## 2023-01-01 RX ADMIN — MEROPENEM 280 MILLIGRAM(S): 1 INJECTION INTRAVENOUS at 15:10

## 2023-01-01 RX ADMIN — Medication 100 GRAM(S): at 08:48

## 2023-01-01 RX ADMIN — Medication 3 MILLILITER(S): at 12:52

## 2023-01-01 RX ADMIN — SODIUM CHLORIDE 3 GRAM(S): 9 INJECTION INTRAMUSCULAR; INTRAVENOUS; SUBCUTANEOUS at 06:00

## 2023-01-01 RX ADMIN — INSULIN HUMAN 6 UNIT(S): 100 INJECTION, SOLUTION SUBCUTANEOUS at 23:27

## 2023-01-01 RX ADMIN — Medication 250 MILLIGRAM(S): at 18:28

## 2023-01-01 RX ADMIN — PROPOFOL 8.71 MICROGRAM(S)/KG/MIN: 10 INJECTION, EMULSION INTRAVENOUS at 18:14

## 2023-01-01 RX ADMIN — FENTANYL CITRATE 25 MICROGRAM(S): 50 INJECTION INTRAVENOUS at 15:19

## 2023-01-01 RX ADMIN — Medication 25 GRAM(S): at 21:31

## 2023-01-01 RX ADMIN — FENTANYL CITRATE 25 MICROGRAM(S): 50 INJECTION INTRAVENOUS at 16:00

## 2023-01-01 RX ADMIN — LACOSAMIDE 110 MILLIGRAM(S): 50 TABLET ORAL at 18:17

## 2023-01-01 RX ADMIN — SODIUM CHLORIDE 2 GRAM(S): 9 INJECTION INTRAMUSCULAR; INTRAVENOUS; SUBCUTANEOUS at 12:21

## 2023-01-01 RX ADMIN — INSULIN HUMAN 6 UNIT(S): 100 INJECTION, SOLUTION SUBCUTANEOUS at 05:22

## 2023-01-01 RX ADMIN — Medication 6: at 13:30

## 2023-01-01 RX ADMIN — LEVETIRACETAM 750 MILLIGRAM(S): 250 TABLET, FILM COATED ORAL at 17:48

## 2023-01-01 RX ADMIN — Medication 1 APPLICATION(S): at 06:02

## 2023-01-01 RX ADMIN — ESCITALOPRAM OXALATE 5 MILLIGRAM(S): 10 TABLET, FILM COATED ORAL at 12:21

## 2023-01-01 RX ADMIN — CHLORHEXIDINE GLUCONATE 15 MILLILITER(S): 213 SOLUTION TOPICAL at 05:42

## 2023-01-01 RX ADMIN — Medication 1 TABLET(S): at 11:23

## 2023-01-01 RX ADMIN — Medication 4 MILLIGRAM(S): at 18:19

## 2023-01-01 RX ADMIN — Medication 6.81 MICROGRAM(S)/KG/MIN: at 19:24

## 2023-01-01 RX ADMIN — CHLORHEXIDINE GLUCONATE 15 MILLILITER(S): 213 SOLUTION TOPICAL at 05:37

## 2023-01-01 RX ADMIN — CISATRACURIUM BESYLATE 10 MILLIGRAM(S): 2 INJECTION INTRAVENOUS at 20:06

## 2023-01-01 RX ADMIN — Medication 6: at 22:13

## 2023-01-01 RX ADMIN — Medication 25 MILLIGRAM(S): at 21:50

## 2023-01-01 RX ADMIN — SODIUM CHLORIDE 3 GRAM(S): 9 INJECTION INTRAMUSCULAR; INTRAVENOUS; SUBCUTANEOUS at 05:58

## 2023-01-01 RX ADMIN — Medication 1 SPRAY(S): at 05:54

## 2023-01-01 RX ADMIN — AMIODARONE HYDROCHLORIDE 400 MILLIGRAM(S): 400 TABLET ORAL at 17:06

## 2023-01-01 RX ADMIN — Medication 2: at 16:41

## 2023-01-01 RX ADMIN — SODIUM CHLORIDE 3 GRAM(S): 9 INJECTION INTRAMUSCULAR; INTRAVENOUS; SUBCUTANEOUS at 23:17

## 2023-01-01 RX ADMIN — LACOSAMIDE 110 MILLIGRAM(S): 50 TABLET ORAL at 18:44

## 2023-01-01 RX ADMIN — Medication 5 MILLIGRAM(S): at 23:00

## 2023-01-01 RX ADMIN — Medication 4 MILLILITER(S): at 16:18

## 2023-01-01 RX ADMIN — CHLORHEXIDINE GLUCONATE 15 MILLILITER(S): 213 SOLUTION TOPICAL at 05:18

## 2023-01-01 RX ADMIN — Medication 25 GRAM(S): at 19:54

## 2023-01-01 RX ADMIN — ENOXAPARIN SODIUM 70 MILLIGRAM(S): 100 INJECTION SUBCUTANEOUS at 17:39

## 2023-01-01 RX ADMIN — SODIUM CHLORIDE 3 GRAM(S): 9 INJECTION INTRAMUSCULAR; INTRAVENOUS; SUBCUTANEOUS at 11:24

## 2023-01-01 RX ADMIN — Medication 100 GRAM(S): at 07:47

## 2023-01-01 RX ADMIN — PROPOFOL 21.8 MICROGRAM(S)/KG/MIN: 10 INJECTION, EMULSION INTRAVENOUS at 03:52

## 2023-01-01 RX ADMIN — LACOSAMIDE 110 MILLIGRAM(S): 50 TABLET ORAL at 18:40

## 2023-01-01 RX ADMIN — Medication 2: at 12:33

## 2023-01-01 RX ADMIN — Medication 5 MILLIGRAM(S): at 22:18

## 2023-01-01 RX ADMIN — ENOXAPARIN SODIUM 70 MILLIGRAM(S): 100 INJECTION SUBCUTANEOUS at 17:41

## 2023-01-01 RX ADMIN — SODIUM CHLORIDE 3 GRAM(S): 9 INJECTION INTRAMUSCULAR; INTRAVENOUS; SUBCUTANEOUS at 23:49

## 2023-01-01 RX ADMIN — SACUBITRIL AND VALSARTAN 1 TABLET(S): 24; 26 TABLET, FILM COATED ORAL at 05:17

## 2023-01-01 RX ADMIN — INSULIN GLARGINE 10 UNIT(S): 100 INJECTION, SOLUTION SUBCUTANEOUS at 22:57

## 2023-01-01 RX ADMIN — ATORVASTATIN CALCIUM 20 MILLIGRAM(S): 80 TABLET, FILM COATED ORAL at 23:06

## 2023-01-01 RX ADMIN — Medication 3 MILLILITER(S): at 06:50

## 2023-01-01 RX ADMIN — SODIUM CHLORIDE 3 GRAM(S): 9 INJECTION INTRAMUSCULAR; INTRAVENOUS; SUBCUTANEOUS at 00:14

## 2023-01-01 RX ADMIN — Medication 200 MILLIGRAM(S): at 21:14

## 2023-01-01 RX ADMIN — Medication 2 MILLIGRAM(S): at 17:41

## 2023-01-01 RX ADMIN — Medication 50 MILLIGRAM(S): at 05:19

## 2023-01-01 RX ADMIN — SODIUM CHLORIDE 4 MILLILITER(S): 9 INJECTION INTRAMUSCULAR; INTRAVENOUS; SUBCUTANEOUS at 10:58

## 2023-01-01 RX ADMIN — SACUBITRIL AND VALSARTAN 1 TABLET(S): 24; 26 TABLET, FILM COATED ORAL at 05:11

## 2023-01-01 RX ADMIN — Medication 1 PACKET(S): at 17:48

## 2023-01-01 RX ADMIN — Medication 1 TABLET(S): at 05:11

## 2023-01-01 RX ADMIN — Medication 4: at 11:51

## 2023-01-01 RX ADMIN — CHLORHEXIDINE GLUCONATE 15 MILLILITER(S): 213 SOLUTION TOPICAL at 17:23

## 2023-01-01 RX ADMIN — Medication 4: at 22:09

## 2023-01-01 RX ADMIN — Medication 1 TABLET(S): at 13:43

## 2023-01-01 RX ADMIN — Medication 1 GRAM(S): at 17:35

## 2023-01-01 RX ADMIN — CHLORHEXIDINE GLUCONATE 15 MILLILITER(S): 213 SOLUTION TOPICAL at 06:26

## 2023-01-01 RX ADMIN — INSULIN GLARGINE 14 UNIT(S): 100 INJECTION, SOLUTION SUBCUTANEOUS at 22:42

## 2023-01-01 RX ADMIN — Medication 6.68 MICROGRAM(S)/KG/MIN: at 05:47

## 2023-01-01 RX ADMIN — Medication 10 MILLIGRAM(S): at 08:27

## 2023-01-01 RX ADMIN — ESCITALOPRAM OXALATE 5 MILLIGRAM(S): 10 TABLET, FILM COATED ORAL at 12:39

## 2023-01-01 RX ADMIN — ESCITALOPRAM OXALATE 5 MILLIGRAM(S): 10 TABLET, FILM COATED ORAL at 12:44

## 2023-01-01 RX ADMIN — Medication 100 GRAM(S): at 11:24

## 2023-01-01 RX ADMIN — Medication 2: at 16:11

## 2023-01-01 RX ADMIN — Medication 6.81 MICROGRAM(S)/KG/MIN: at 10:32

## 2023-01-01 RX ADMIN — LEVETIRACETAM 750 MILLIGRAM(S): 250 TABLET, FILM COATED ORAL at 17:46

## 2023-01-01 RX ADMIN — Medication 1 SPRAY(S): at 06:41

## 2023-01-01 RX ADMIN — Medication 25 GRAM(S): at 21:33

## 2023-01-01 RX ADMIN — CEFEPIME 100 MILLIGRAM(S): 1 INJECTION, POWDER, FOR SOLUTION INTRAMUSCULAR; INTRAVENOUS at 01:02

## 2023-01-01 RX ADMIN — Medication 1 SPRAY(S): at 17:19

## 2023-01-01 RX ADMIN — Medication 1 GRAM(S): at 17:24

## 2023-01-01 RX ADMIN — SODIUM CHLORIDE 75 MILLILITER(S): 5 INJECTION, SOLUTION INTRAVENOUS at 10:41

## 2023-01-01 RX ADMIN — MEROPENEM 100 MILLIGRAM(S): 1 INJECTION INTRAVENOUS at 09:42

## 2023-01-01 RX ADMIN — PANTOPRAZOLE SODIUM 40 MILLIGRAM(S): 20 TABLET, DELAYED RELEASE ORAL at 07:00

## 2023-01-01 RX ADMIN — Medication 2 MILLIGRAM(S): at 18:04

## 2023-01-01 RX ADMIN — FLUDROCORTISONE ACETATE 0.2 MILLIGRAM(S): 0.1 TABLET ORAL at 06:31

## 2023-01-01 RX ADMIN — ONDANSETRON 4 MILLIGRAM(S): 8 TABLET, FILM COATED ORAL at 12:35

## 2023-01-01 RX ADMIN — SODIUM CHLORIDE 2 GRAM(S): 9 INJECTION INTRAMUSCULAR; INTRAVENOUS; SUBCUTANEOUS at 10:23

## 2023-01-01 RX ADMIN — Medication 4 MILLILITER(S): at 21:11

## 2023-01-01 RX ADMIN — Medication 5 MILLIGRAM(S): at 22:57

## 2023-01-01 RX ADMIN — SACUBITRIL AND VALSARTAN 1 TABLET(S): 24; 26 TABLET, FILM COATED ORAL at 06:17

## 2023-01-01 RX ADMIN — Medication 40 MILLIEQUIVALENT(S): at 07:47

## 2023-01-01 RX ADMIN — PANTOPRAZOLE SODIUM 40 MILLIGRAM(S): 20 TABLET, DELAYED RELEASE ORAL at 06:17

## 2023-01-01 RX ADMIN — Medication 1 GRAM(S): at 19:02

## 2023-01-01 RX ADMIN — INSULIN HUMAN 4: 100 INJECTION, SOLUTION SUBCUTANEOUS at 16:43

## 2023-01-01 RX ADMIN — Medication 1 SPRAY(S): at 06:45

## 2023-01-01 RX ADMIN — Medication 50 MILLIEQUIVALENT(S): at 02:10

## 2023-01-01 RX ADMIN — CHLORHEXIDINE GLUCONATE 15 MILLILITER(S): 213 SOLUTION TOPICAL at 19:20

## 2023-01-01 RX ADMIN — PANTOPRAZOLE SODIUM 40 MILLIGRAM(S): 20 TABLET, DELAYED RELEASE ORAL at 11:36

## 2023-01-01 RX ADMIN — Medication 4: at 12:38

## 2023-01-01 RX ADMIN — INSULIN GLARGINE 10 UNIT(S): 100 INJECTION, SOLUTION SUBCUTANEOUS at 00:14

## 2023-01-01 RX ADMIN — Medication 1 APPLICATION(S): at 23:21

## 2023-01-01 RX ADMIN — Medication 1 SPRAY(S): at 05:30

## 2023-01-01 RX ADMIN — LEVETIRACETAM 1000 MILLIGRAM(S): 250 TABLET, FILM COATED ORAL at 06:33

## 2023-01-01 RX ADMIN — Medication 1000 MILLIGRAM(S): at 22:00

## 2023-01-01 RX ADMIN — LEVETIRACETAM 750 MILLIGRAM(S): 250 TABLET, FILM COATED ORAL at 05:20

## 2023-01-01 RX ADMIN — Medication 1 SPRAY(S): at 17:48

## 2023-01-01 RX ADMIN — Medication 5 MILLIGRAM(S): at 23:33

## 2023-01-01 RX ADMIN — SODIUM CHLORIDE 1000 MILLILITER(S): 9 INJECTION INTRAMUSCULAR; INTRAVENOUS; SUBCUTANEOUS at 15:24

## 2023-01-01 RX ADMIN — CEFTRIAXONE 100 MILLIGRAM(S): 500 INJECTION, POWDER, FOR SOLUTION INTRAMUSCULAR; INTRAVENOUS at 11:51

## 2023-01-01 RX ADMIN — SODIUM CHLORIDE 3 GRAM(S): 9 INJECTION INTRAMUSCULAR; INTRAVENOUS; SUBCUTANEOUS at 19:39

## 2023-01-01 RX ADMIN — Medication 100 MILLIEQUIVALENT(S): at 22:03

## 2023-01-01 RX ADMIN — LACOSAMIDE 110 MILLIGRAM(S): 50 TABLET ORAL at 06:11

## 2023-01-01 RX ADMIN — PROPOFOL 21.8 MICROGRAM(S)/KG/MIN: 10 INJECTION, EMULSION INTRAVENOUS at 17:33

## 2023-01-01 RX ADMIN — INSULIN HUMAN 6 UNIT(S): 100 INJECTION, SOLUTION SUBCUTANEOUS at 17:02

## 2023-01-01 RX ADMIN — Medication 3 MILLILITER(S): at 05:43

## 2023-01-01 RX ADMIN — Medication 1 GRAM(S): at 12:19

## 2023-01-01 RX ADMIN — SACUBITRIL AND VALSARTAN 1 TABLET(S): 24; 26 TABLET, FILM COATED ORAL at 06:00

## 2023-01-01 RX ADMIN — SODIUM CHLORIDE 50 MILLILITER(S): 5 INJECTION, SOLUTION INTRAVENOUS at 12:30

## 2023-01-01 RX ADMIN — PHENYLEPHRINE HYDROCHLORIDE 2.72 MICROGRAM(S)/KG/MIN: 10 INJECTION INTRAVENOUS at 21:21

## 2023-01-01 RX ADMIN — SODIUM CHLORIDE 2 GRAM(S): 9 INJECTION INTRAMUSCULAR; INTRAVENOUS; SUBCUTANEOUS at 11:48

## 2023-01-01 RX ADMIN — ENOXAPARIN SODIUM 70 MILLIGRAM(S): 100 INJECTION SUBCUTANEOUS at 05:19

## 2023-01-01 RX ADMIN — Medication 40 MILLIEQUIVALENT(S): at 06:58

## 2023-01-01 RX ADMIN — Medication 400 MILLIGRAM(S): at 16:41

## 2023-01-01 RX ADMIN — Medication 400 MILLIGRAM(S): at 22:45

## 2023-01-01 RX ADMIN — Medication 1 SPRAY(S): at 05:46

## 2023-01-01 RX ADMIN — Medication 4 MILLILITER(S): at 22:14

## 2023-01-01 RX ADMIN — LEVETIRACETAM 750 MILLIGRAM(S): 250 TABLET, FILM COATED ORAL at 05:10

## 2023-01-01 RX ADMIN — Medication 1 SPRAY(S): at 17:54

## 2023-01-01 RX ADMIN — Medication 1 GRAM(S): at 17:00

## 2023-01-01 RX ADMIN — SODIUM CHLORIDE 50 MILLILITER(S): 5 INJECTION, SOLUTION INTRAVENOUS at 11:46

## 2023-01-01 RX ADMIN — LEVETIRACETAM 750 MILLIGRAM(S): 250 TABLET, FILM COATED ORAL at 17:21

## 2023-01-01 RX ADMIN — Medication 6 MILLIGRAM(S): at 21:47

## 2023-01-01 RX ADMIN — PHENYLEPHRINE HYDROCHLORIDE 2.72 MICROGRAM(S)/KG/MIN: 10 INJECTION INTRAVENOUS at 18:41

## 2023-01-01 RX ADMIN — LEVETIRACETAM 750 MILLIGRAM(S): 250 TABLET, FILM COATED ORAL at 18:18

## 2023-01-01 RX ADMIN — Medication 1 GRAM(S): at 17:49

## 2023-01-01 RX ADMIN — Medication 2: at 06:43

## 2023-01-01 RX ADMIN — ESCITALOPRAM OXALATE 5 MILLIGRAM(S): 10 TABLET, FILM COATED ORAL at 13:08

## 2023-01-01 RX ADMIN — AMIODARONE HYDROCHLORIDE 400 MILLIGRAM(S): 400 TABLET ORAL at 17:00

## 2023-01-01 RX ADMIN — HYDROMORPHONE HYDROCHLORIDE 0.25 MILLIGRAM(S): 2 INJECTION INTRAMUSCULAR; INTRAVENOUS; SUBCUTANEOUS at 14:27

## 2023-01-01 RX ADMIN — CEFEPIME 100 MILLIGRAM(S): 1 INJECTION, POWDER, FOR SOLUTION INTRAMUSCULAR; INTRAVENOUS at 17:02

## 2023-01-01 RX ADMIN — Medication 62.5 MILLIMOLE(S): at 02:08

## 2023-01-01 RX ADMIN — Medication 1 GRAM(S): at 17:31

## 2023-01-01 RX ADMIN — Medication 1 GRAM(S): at 17:39

## 2023-01-01 RX ADMIN — Medication 400 MILLIGRAM(S): at 16:44

## 2023-01-01 RX ADMIN — Medication 2: at 05:21

## 2023-01-01 RX ADMIN — Medication 1 SPRAY(S): at 05:12

## 2023-01-01 RX ADMIN — CHLORHEXIDINE GLUCONATE 15 MILLILITER(S): 213 SOLUTION TOPICAL at 06:00

## 2023-01-01 RX ADMIN — SODIUM CHLORIDE 2 GRAM(S): 9 INJECTION INTRAMUSCULAR; INTRAVENOUS; SUBCUTANEOUS at 23:30

## 2023-01-01 RX ADMIN — Medication 1 GRAM(S): at 17:37

## 2023-01-01 RX ADMIN — Medication 100 GRAM(S): at 08:17

## 2023-01-01 RX ADMIN — Medication 100 MILLIEQUIVALENT(S): at 07:53

## 2023-01-01 RX ADMIN — Medication 3 MILLILITER(S): at 16:04

## 2023-01-01 RX ADMIN — Medication 1 GRAM(S): at 18:51

## 2023-01-01 RX ADMIN — Medication 4 MILLIGRAM(S): at 06:47

## 2023-01-01 RX ADMIN — ATORVASTATIN CALCIUM 20 MILLIGRAM(S): 80 TABLET, FILM COATED ORAL at 22:57

## 2023-01-01 RX ADMIN — SODIUM CHLORIDE 4 MILLILITER(S): 9 INJECTION INTRAMUSCULAR; INTRAVENOUS; SUBCUTANEOUS at 06:03

## 2023-01-01 RX ADMIN — LEVETIRACETAM 400 MILLIGRAM(S): 250 TABLET, FILM COATED ORAL at 06:51

## 2023-01-01 RX ADMIN — LEVETIRACETAM 400 MILLIGRAM(S): 250 TABLET, FILM COATED ORAL at 18:24

## 2023-01-01 RX ADMIN — SODIUM CHLORIDE 3 GRAM(S): 9 INJECTION INTRAMUSCULAR; INTRAVENOUS; SUBCUTANEOUS at 01:15

## 2023-01-01 RX ADMIN — LEVETIRACETAM 400 MILLIGRAM(S): 250 TABLET, FILM COATED ORAL at 08:18

## 2023-01-01 RX ADMIN — SODIUM CHLORIDE 3 GRAM(S): 9 INJECTION INTRAMUSCULAR; INTRAVENOUS; SUBCUTANEOUS at 17:54

## 2023-01-01 RX ADMIN — Medication 6 MILLIGRAM(S): at 21:21

## 2023-01-01 RX ADMIN — LEVETIRACETAM 400 MILLIGRAM(S): 250 TABLET, FILM COATED ORAL at 06:55

## 2023-01-01 RX ADMIN — Medication 1 APPLICATION(S): at 11:28

## 2023-01-01 RX ADMIN — SODIUM CHLORIDE 4 MILLILITER(S): 9 INJECTION INTRAMUSCULAR; INTRAVENOUS; SUBCUTANEOUS at 21:20

## 2023-01-01 RX ADMIN — Medication 4 MILLIGRAM(S): at 05:04

## 2023-01-01 RX ADMIN — Medication 4 MILLILITER(S): at 10:19

## 2023-01-01 RX ADMIN — CHLORHEXIDINE GLUCONATE 15 MILLILITER(S): 213 SOLUTION TOPICAL at 05:52

## 2023-01-01 RX ADMIN — SODIUM CHLORIDE 3 GRAM(S): 9 INJECTION INTRAMUSCULAR; INTRAVENOUS; SUBCUTANEOUS at 12:44

## 2023-01-01 RX ADMIN — ESCITALOPRAM OXALATE 5 MILLIGRAM(S): 10 TABLET, FILM COATED ORAL at 12:09

## 2023-01-01 RX ADMIN — LACOSAMIDE 110 MILLIGRAM(S): 50 TABLET ORAL at 06:59

## 2023-01-01 RX ADMIN — VASOPRESSIN 6 UNIT(S)/MIN: 20 INJECTION INTRAVENOUS at 23:32

## 2023-01-01 RX ADMIN — Medication 1 SPRAY(S): at 05:26

## 2023-01-01 RX ADMIN — LEVETIRACETAM 400 MILLIGRAM(S): 250 TABLET, FILM COATED ORAL at 05:53

## 2023-01-01 RX ADMIN — ENOXAPARIN SODIUM 70 MILLIGRAM(S): 100 INJECTION SUBCUTANEOUS at 05:25

## 2023-01-01 RX ADMIN — Medication 2 MILLIGRAM(S): at 17:39

## 2023-01-01 RX ADMIN — SODIUM CHLORIDE 3 GRAM(S): 9 INJECTION INTRAMUSCULAR; INTRAVENOUS; SUBCUTANEOUS at 06:44

## 2023-01-01 RX ADMIN — Medication 2: at 06:50

## 2023-01-01 RX ADMIN — SODIUM CHLORIDE 3 GRAM(S): 9 INJECTION INTRAMUSCULAR; INTRAVENOUS; SUBCUTANEOUS at 06:45

## 2023-01-01 RX ADMIN — Medication 6 MILLIGRAM(S): at 21:12

## 2023-01-01 RX ADMIN — SODIUM CHLORIDE 3 GRAM(S): 9 INJECTION INTRAMUSCULAR; INTRAVENOUS; SUBCUTANEOUS at 12:46

## 2023-01-01 RX ADMIN — Medication 2: at 17:15

## 2023-01-01 RX ADMIN — Medication 3 MILLILITER(S): at 06:29

## 2023-01-01 RX ADMIN — Medication 20 MILLIEQUIVALENT(S): at 17:38

## 2023-01-01 RX ADMIN — ATORVASTATIN CALCIUM 20 MILLIGRAM(S): 80 TABLET, FILM COATED ORAL at 21:19

## 2023-01-01 RX ADMIN — INSULIN GLARGINE 10 UNIT(S): 100 INJECTION, SOLUTION SUBCUTANEOUS at 23:35

## 2023-01-01 RX ADMIN — Medication 1 SPRAY(S): at 05:44

## 2023-01-01 RX ADMIN — Medication 6 MILLIGRAM(S): at 21:14

## 2023-01-01 RX ADMIN — SODIUM CHLORIDE 3 GRAM(S): 9 INJECTION INTRAMUSCULAR; INTRAVENOUS; SUBCUTANEOUS at 12:20

## 2023-01-01 RX ADMIN — FENTANYL CITRATE 25 MICROGRAM(S): 50 INJECTION INTRAVENOUS at 15:58

## 2023-01-01 RX ADMIN — Medication 100 MILLIEQUIVALENT(S): at 06:02

## 2023-01-01 RX ADMIN — PANTOPRAZOLE SODIUM 40 MILLIGRAM(S): 20 TABLET, DELAYED RELEASE ORAL at 18:54

## 2023-01-01 RX ADMIN — SACUBITRIL AND VALSARTAN 1 TABLET(S): 24; 26 TABLET, FILM COATED ORAL at 18:29

## 2023-01-01 RX ADMIN — Medication 40 MILLIEQUIVALENT(S): at 07:27

## 2023-01-01 RX ADMIN — Medication 4 MILLILITER(S): at 15:19

## 2023-01-01 RX ADMIN — CISATRACURIUM BESYLATE 13.1 MICROGRAM(S)/KG/MIN: 2 INJECTION INTRAVENOUS at 18:17

## 2023-01-01 RX ADMIN — SODIUM CHLORIDE 3 GRAM(S): 9 INJECTION INTRAMUSCULAR; INTRAVENOUS; SUBCUTANEOUS at 23:33

## 2023-01-01 RX ADMIN — Medication 20 MILLIGRAM(S): at 01:24

## 2023-01-01 RX ADMIN — Medication 100 MILLIEQUIVALENT(S): at 10:11

## 2023-01-01 RX ADMIN — PANTOPRAZOLE SODIUM 10 MG/HR: 20 TABLET, DELAYED RELEASE ORAL at 10:05

## 2023-01-01 RX ADMIN — PROPOFOL 21.8 MICROGRAM(S)/KG/MIN: 10 INJECTION, EMULSION INTRAVENOUS at 19:54

## 2023-01-01 RX ADMIN — Medication 25 GRAM(S): at 05:37

## 2023-01-01 RX ADMIN — Medication 6.81 MICROGRAM(S)/KG/MIN: at 01:20

## 2023-01-01 RX ADMIN — Medication 1 SPRAY(S): at 05:53

## 2023-01-01 RX ADMIN — Medication 4 MILLILITER(S): at 20:48

## 2023-01-01 RX ADMIN — Medication 100 MILLIEQUIVALENT(S): at 07:00

## 2023-01-01 RX ADMIN — Medication 3 MILLILITER(S): at 09:12

## 2023-01-01 RX ADMIN — SODIUM CHLORIDE 2 GRAM(S): 9 INJECTION INTRAMUSCULAR; INTRAVENOUS; SUBCUTANEOUS at 11:28

## 2023-01-01 RX ADMIN — Medication 1 SPRAY(S): at 21:41

## 2023-01-01 RX ADMIN — FENTANYL CITRATE 25 MICROGRAM(S): 50 INJECTION INTRAVENOUS at 23:40

## 2023-01-01 RX ADMIN — SODIUM CHLORIDE 4 MILLILITER(S): 9 INJECTION INTRAMUSCULAR; INTRAVENOUS; SUBCUTANEOUS at 20:22

## 2023-01-01 RX ADMIN — SENNA PLUS 2 TABLET(S): 8.6 TABLET ORAL at 21:19

## 2023-01-01 RX ADMIN — LEVETIRACETAM 750 MILLIGRAM(S): 250 TABLET, FILM COATED ORAL at 17:20

## 2023-01-01 RX ADMIN — Medication 4 MILLILITER(S): at 18:03

## 2023-01-01 RX ADMIN — Medication 100 MILLIGRAM(S): at 22:14

## 2023-01-01 RX ADMIN — FENTANYL CITRATE 25 MICROGRAM(S): 50 INJECTION INTRAVENOUS at 12:45

## 2023-01-01 RX ADMIN — SODIUM CHLORIDE 4 MILLILITER(S): 9 INJECTION INTRAMUSCULAR; INTRAVENOUS; SUBCUTANEOUS at 16:19

## 2023-01-01 RX ADMIN — SODIUM CHLORIDE 3 GRAM(S): 9 INJECTION INTRAMUSCULAR; INTRAVENOUS; SUBCUTANEOUS at 23:29

## 2023-01-01 RX ADMIN — Medication 15 GRAM(S): at 17:38

## 2023-01-01 RX ADMIN — Medication 1 GRAM(S): at 18:59

## 2023-01-01 RX ADMIN — Medication 4 MILLILITER(S): at 09:56

## 2023-01-01 RX ADMIN — Medication 1 SPRAY(S): at 17:24

## 2023-01-01 RX ADMIN — ENOXAPARIN SODIUM 75 MILLIGRAM(S): 100 INJECTION SUBCUTANEOUS at 05:38

## 2023-01-01 RX ADMIN — Medication 650 MILLIGRAM(S): at 13:44

## 2023-01-01 RX ADMIN — INSULIN HUMAN 6: 100 INJECTION, SOLUTION SUBCUTANEOUS at 06:39

## 2023-01-01 RX ADMIN — SODIUM CHLORIDE 3 GRAM(S): 9 INJECTION INTRAMUSCULAR; INTRAVENOUS; SUBCUTANEOUS at 06:16

## 2023-01-01 RX ADMIN — Medication 25 MILLIGRAM(S): at 17:00

## 2023-01-01 RX ADMIN — Medication 1 GRAM(S): at 05:59

## 2023-01-01 RX ADMIN — Medication 10 MILLIGRAM(S): at 21:33

## 2023-01-01 RX ADMIN — Medication 100 MILLIEQUIVALENT(S): at 05:00

## 2023-01-01 RX ADMIN — Medication 4: at 11:17

## 2023-01-01 RX ADMIN — MIDODRINE HYDROCHLORIDE 15 MILLIGRAM(S): 2.5 TABLET ORAL at 22:13

## 2023-01-01 RX ADMIN — Medication 4: at 22:45

## 2023-01-01 RX ADMIN — Medication 10 MILLIGRAM(S): at 14:08

## 2023-01-01 RX ADMIN — SODIUM CHLORIDE 4 MILLILITER(S): 9 INJECTION INTRAMUSCULAR; INTRAVENOUS; SUBCUTANEOUS at 04:48

## 2023-01-01 RX ADMIN — Medication 2: at 12:15

## 2023-01-01 RX ADMIN — SODIUM CHLORIDE 3 GRAM(S): 9 INJECTION INTRAMUSCULAR; INTRAVENOUS; SUBCUTANEOUS at 00:29

## 2023-01-01 RX ADMIN — Medication 1 SPRAY(S): at 18:30

## 2023-01-01 RX ADMIN — SODIUM CHLORIDE 2 GRAM(S): 9 INJECTION INTRAMUSCULAR; INTRAVENOUS; SUBCUTANEOUS at 18:23

## 2023-01-01 RX ADMIN — ENOXAPARIN SODIUM 70 MILLIGRAM(S): 100 INJECTION SUBCUTANEOUS at 06:41

## 2023-01-01 RX ADMIN — SODIUM CHLORIDE 3 GRAM(S): 9 INJECTION INTRAMUSCULAR; INTRAVENOUS; SUBCUTANEOUS at 23:42

## 2023-01-01 RX ADMIN — PROPOFOL 4.27 MICROGRAM(S)/KG/MIN: 10 INJECTION, EMULSION INTRAVENOUS at 11:36

## 2023-01-01 RX ADMIN — LACOSAMIDE 110 MILLIGRAM(S): 50 TABLET ORAL at 19:03

## 2023-01-01 RX ADMIN — PANTOPRAZOLE SODIUM 40 MILLIGRAM(S): 20 TABLET, DELAYED RELEASE ORAL at 05:51

## 2023-01-01 RX ADMIN — SODIUM CHLORIDE 2 GRAM(S): 9 INJECTION INTRAMUSCULAR; INTRAVENOUS; SUBCUTANEOUS at 23:10

## 2023-01-01 RX ADMIN — ATORVASTATIN CALCIUM 20 MILLIGRAM(S): 80 TABLET, FILM COATED ORAL at 22:36

## 2023-01-01 RX ADMIN — Medication 6 MILLIGRAM(S): at 21:04

## 2023-01-01 RX ADMIN — SENNA PLUS 2 TABLET(S): 8.6 TABLET ORAL at 21:28

## 2023-01-01 RX ADMIN — Medication 6.81 MICROGRAM(S)/KG/MIN: at 11:23

## 2023-01-01 RX ADMIN — Medication 650 MILLIGRAM(S): at 21:10

## 2023-01-01 RX ADMIN — INSULIN GLARGINE 20 UNIT(S): 100 INJECTION, SOLUTION SUBCUTANEOUS at 23:29

## 2023-01-01 RX ADMIN — SODIUM CHLORIDE 3 GRAM(S): 9 INJECTION INTRAMUSCULAR; INTRAVENOUS; SUBCUTANEOUS at 18:06

## 2023-01-01 RX ADMIN — INSULIN HUMAN 2 UNIT(S): 100 INJECTION, SOLUTION SUBCUTANEOUS at 17:06

## 2023-01-01 RX ADMIN — INSULIN HUMAN 2: 100 INJECTION, SOLUTION SUBCUTANEOUS at 00:16

## 2023-01-01 RX ADMIN — POTASSIUM PHOSPHATE, MONOBASIC POTASSIUM PHOSPHATE, DIBASIC 83.33 MILLIMOLE(S): 236; 224 INJECTION, SOLUTION INTRAVENOUS at 10:32

## 2023-01-01 RX ADMIN — Medication 1 SPRAY(S): at 05:39

## 2023-01-01 RX ADMIN — Medication 1 SPRAY(S): at 18:51

## 2023-01-01 RX ADMIN — Medication 1 GRAM(S): at 12:48

## 2023-01-01 RX ADMIN — FLUDROCORTISONE ACETATE 0.1 MILLIGRAM(S): 0.1 TABLET ORAL at 06:45

## 2023-01-01 RX ADMIN — Medication 100 MILLIGRAM(S): at 06:14

## 2023-01-01 RX ADMIN — SODIUM CHLORIDE 4 MILLILITER(S): 9 INJECTION INTRAMUSCULAR; INTRAVENOUS; SUBCUTANEOUS at 09:58

## 2023-01-01 RX ADMIN — PANTOPRAZOLE SODIUM 40 MILLIGRAM(S): 20 TABLET, DELAYED RELEASE ORAL at 18:03

## 2023-01-01 RX ADMIN — Medication 1 SPRAY(S): at 05:11

## 2023-01-01 RX ADMIN — Medication 4 MILLILITER(S): at 12:47

## 2023-01-01 RX ADMIN — Medication 1 SPRAY(S): at 07:36

## 2023-01-01 RX ADMIN — Medication 25 GRAM(S): at 06:40

## 2023-01-01 RX ADMIN — INSULIN HUMAN 4: 100 INJECTION, SOLUTION SUBCUTANEOUS at 13:27

## 2023-01-01 RX ADMIN — PANTOPRAZOLE SODIUM 40 MILLIGRAM(S): 20 TABLET, DELAYED RELEASE ORAL at 22:18

## 2023-01-01 RX ADMIN — Medication 1 GRAM(S): at 23:25

## 2023-01-01 RX ADMIN — Medication 30 MILLILITER(S): at 16:20

## 2023-01-01 RX ADMIN — Medication 1 GRAM(S): at 05:55

## 2023-01-01 RX ADMIN — INSULIN HUMAN 6 UNIT(S): 100 INJECTION, SOLUTION SUBCUTANEOUS at 17:38

## 2023-01-01 RX ADMIN — Medication 40 MILLIEQUIVALENT(S): at 17:44

## 2023-01-01 RX ADMIN — CHLORHEXIDINE GLUCONATE 15 MILLILITER(S): 213 SOLUTION TOPICAL at 17:06

## 2023-01-01 RX ADMIN — Medication 3 MILLILITER(S): at 17:20

## 2023-01-01 RX ADMIN — Medication 25 GRAM(S): at 09:31

## 2023-01-01 RX ADMIN — CEFEPIME 100 MILLIGRAM(S): 1 INJECTION, POWDER, FOR SOLUTION INTRAMUSCULAR; INTRAVENOUS at 09:39

## 2023-01-01 RX ADMIN — Medication 2: at 11:51

## 2023-01-01 RX ADMIN — Medication 1 APPLICATION(S): at 22:00

## 2023-01-01 RX ADMIN — Medication 6 MILLIGRAM(S): at 21:11

## 2023-01-01 RX ADMIN — Medication 1 GRAM(S): at 05:27

## 2023-01-01 RX ADMIN — Medication 4 MILLILITER(S): at 21:14

## 2023-01-01 RX ADMIN — Medication 1 TABLET(S): at 12:19

## 2023-01-01 RX ADMIN — Medication 25 MILLIGRAM(S): at 05:18

## 2023-01-01 RX ADMIN — Medication 25 MILLIGRAM(S): at 18:12

## 2023-01-01 RX ADMIN — ATORVASTATIN CALCIUM 20 MILLIGRAM(S): 80 TABLET, FILM COATED ORAL at 21:21

## 2023-01-01 RX ADMIN — Medication 100 MILLIGRAM(S): at 14:26

## 2023-01-01 RX ADMIN — Medication 2: at 13:37

## 2023-01-01 RX ADMIN — Medication 4 MILLIGRAM(S): at 17:51

## 2023-01-01 RX ADMIN — Medication 4 UNIT(S): at 08:30

## 2023-01-01 RX ADMIN — CHLORHEXIDINE GLUCONATE 15 MILLILITER(S): 213 SOLUTION TOPICAL at 05:27

## 2023-01-01 RX ADMIN — SODIUM CHLORIDE 3 GRAM(S): 9 INJECTION INTRAMUSCULAR; INTRAVENOUS; SUBCUTANEOUS at 17:46

## 2023-01-01 RX ADMIN — AMIODARONE HYDROCHLORIDE 400 MILLIGRAM(S): 400 TABLET ORAL at 05:18

## 2023-01-01 RX ADMIN — ESCITALOPRAM OXALATE 5 MILLIGRAM(S): 10 TABLET, FILM COATED ORAL at 11:32

## 2023-01-01 RX ADMIN — PIPERACILLIN AND TAZOBACTAM 25 GRAM(S): 4; .5 INJECTION, POWDER, LYOPHILIZED, FOR SOLUTION INTRAVENOUS at 07:36

## 2023-01-01 RX ADMIN — ENOXAPARIN SODIUM 70 MILLIGRAM(S): 100 INJECTION SUBCUTANEOUS at 05:46

## 2023-01-01 RX ADMIN — Medication 4 UNIT(S): at 12:22

## 2023-01-01 RX ADMIN — SODIUM CHLORIDE 2 GRAM(S): 9 INJECTION INTRAMUSCULAR; INTRAVENOUS; SUBCUTANEOUS at 17:31

## 2023-01-01 RX ADMIN — Medication 650 MILLIGRAM(S): at 14:35

## 2023-01-01 RX ADMIN — SACUBITRIL AND VALSARTAN 1 TABLET(S): 24; 26 TABLET, FILM COATED ORAL at 06:11

## 2023-01-01 RX ADMIN — SODIUM CHLORIDE 2 GRAM(S): 9 INJECTION INTRAMUSCULAR; INTRAVENOUS; SUBCUTANEOUS at 17:02

## 2023-01-01 RX ADMIN — LEVETIRACETAM 750 MILLIGRAM(S): 250 TABLET, FILM COATED ORAL at 06:30

## 2023-01-01 RX ADMIN — PANTOPRAZOLE SODIUM 40 MILLIGRAM(S): 20 TABLET, DELAYED RELEASE ORAL at 17:07

## 2023-01-01 RX ADMIN — Medication 1 SPRAY(S): at 17:20

## 2023-01-01 RX ADMIN — Medication 1 GRAM(S): at 17:05

## 2023-01-01 RX ADMIN — INSULIN HUMAN 2: 100 INJECTION, SOLUTION SUBCUTANEOUS at 23:27

## 2023-01-01 RX ADMIN — LEVETIRACETAM 400 MILLIGRAM(S): 250 TABLET, FILM COATED ORAL at 10:10

## 2023-01-01 RX ADMIN — Medication 1 PACKET(S): at 17:54

## 2023-01-01 RX ADMIN — SODIUM CHLORIDE 3 GRAM(S): 9 INJECTION INTRAMUSCULAR; INTRAVENOUS; SUBCUTANEOUS at 18:19

## 2023-01-01 RX ADMIN — Medication 1 SPRAY(S): at 17:36

## 2023-01-01 RX ADMIN — Medication 6: at 12:18

## 2023-01-01 RX ADMIN — Medication 1 GRAM(S): at 23:21

## 2023-01-01 RX ADMIN — SODIUM CHLORIDE 1000 MILLILITER(S): 9 INJECTION INTRAMUSCULAR; INTRAVENOUS; SUBCUTANEOUS at 04:18

## 2023-01-01 RX ADMIN — PROPOFOL 21.8 MICROGRAM(S)/KG/MIN: 10 INJECTION, EMULSION INTRAVENOUS at 09:00

## 2023-01-01 RX ADMIN — Medication 100 MILLIGRAM(S): at 20:31

## 2023-01-01 RX ADMIN — Medication 650 MILLIGRAM(S): at 12:47

## 2023-01-01 RX ADMIN — Medication 1 GRAM(S): at 23:11

## 2023-01-01 RX ADMIN — LEVETIRACETAM 750 MILLIGRAM(S): 250 TABLET, FILM COATED ORAL at 17:05

## 2023-01-01 RX ADMIN — Medication 2: at 00:31

## 2023-01-01 RX ADMIN — SACUBITRIL AND VALSARTAN 1 TABLET(S): 24; 26 TABLET, FILM COATED ORAL at 17:49

## 2023-01-01 RX ADMIN — SODIUM CHLORIDE 3 GRAM(S): 9 INJECTION INTRAMUSCULAR; INTRAVENOUS; SUBCUTANEOUS at 18:00

## 2023-01-01 RX ADMIN — ENOXAPARIN SODIUM 75 MILLIGRAM(S): 100 INJECTION SUBCUTANEOUS at 17:20

## 2023-01-01 RX ADMIN — ESCITALOPRAM OXALATE 5 MILLIGRAM(S): 10 TABLET, FILM COATED ORAL at 12:22

## 2023-01-01 RX ADMIN — INSULIN HUMAN 4: 100 INJECTION, SOLUTION SUBCUTANEOUS at 05:46

## 2023-01-01 RX ADMIN — ESCITALOPRAM OXALATE 5 MILLIGRAM(S): 10 TABLET, FILM COATED ORAL at 11:29

## 2023-01-01 RX ADMIN — CHLORHEXIDINE GLUCONATE 1 APPLICATION(S): 213 SOLUTION TOPICAL at 05:23

## 2023-01-01 RX ADMIN — ESCITALOPRAM OXALATE 5 MILLIGRAM(S): 10 TABLET, FILM COATED ORAL at 12:13

## 2023-01-01 RX ADMIN — INSULIN HUMAN 6 UNIT(S): 100 INJECTION, SOLUTION SUBCUTANEOUS at 05:46

## 2023-01-01 RX ADMIN — SODIUM CHLORIDE 3 GRAM(S): 9 INJECTION INTRAMUSCULAR; INTRAVENOUS; SUBCUTANEOUS at 11:57

## 2023-01-01 RX ADMIN — Medication 10 MILLIGRAM(S): at 21:41

## 2023-01-01 RX ADMIN — ENOXAPARIN SODIUM 40 MILLIGRAM(S): 100 INJECTION SUBCUTANEOUS at 22:26

## 2023-01-01 RX ADMIN — SODIUM CHLORIDE 1000 MILLILITER(S): 9 INJECTION INTRAMUSCULAR; INTRAVENOUS; SUBCUTANEOUS at 15:51

## 2023-01-01 RX ADMIN — MEROPENEM 280 MILLIGRAM(S): 1 INJECTION INTRAVENOUS at 14:39

## 2023-01-01 RX ADMIN — ATORVASTATIN CALCIUM 20 MILLIGRAM(S): 80 TABLET, FILM COATED ORAL at 21:50

## 2023-01-01 RX ADMIN — Medication 400 MILLIGRAM(S): at 22:10

## 2023-01-01 RX ADMIN — Medication 4 MILLIGRAM(S): at 06:12

## 2023-01-01 RX ADMIN — SODIUM CHLORIDE 2 GRAM(S): 9 INJECTION INTRAMUSCULAR; INTRAVENOUS; SUBCUTANEOUS at 23:21

## 2023-01-01 RX ADMIN — PROPOFOL 21.8 MICROGRAM(S)/KG/MIN: 10 INJECTION, EMULSION INTRAVENOUS at 23:32

## 2023-01-01 RX ADMIN — Medication 2: at 21:58

## 2023-01-01 RX ADMIN — ENOXAPARIN SODIUM 75 MILLIGRAM(S): 100 INJECTION SUBCUTANEOUS at 06:11

## 2023-01-01 RX ADMIN — Medication 100 GRAM(S): at 07:27

## 2023-01-01 RX ADMIN — Medication 1 APPLICATION(S): at 21:44

## 2023-01-01 RX ADMIN — Medication 1 SPRAY(S): at 17:37

## 2023-01-01 RX ADMIN — SODIUM CHLORIDE 200 MILLILITER(S): 9 INJECTION INTRAMUSCULAR; INTRAVENOUS; SUBCUTANEOUS at 01:40

## 2023-01-01 RX ADMIN — Medication 650 MILLIGRAM(S): at 14:31

## 2023-01-01 RX ADMIN — Medication 1 GRAM(S): at 05:29

## 2023-01-01 RX ADMIN — Medication 12.5 MILLIGRAM(S): at 10:43

## 2023-01-01 RX ADMIN — INSULIN HUMAN 2 UNIT(S): 100 INJECTION, SOLUTION SUBCUTANEOUS at 16:44

## 2023-01-01 RX ADMIN — Medication 1 SPRAY(S): at 06:02

## 2023-01-01 RX ADMIN — Medication 1 GRAM(S): at 19:06

## 2023-01-01 RX ADMIN — Medication 4 UNIT(S): at 11:47

## 2023-01-01 RX ADMIN — SODIUM CHLORIDE 30 MILLILITER(S): 5 INJECTION, SOLUTION INTRAVENOUS at 17:38

## 2023-01-01 RX ADMIN — Medication 1 GRAM(S): at 23:04

## 2023-01-01 RX ADMIN — FLUDROCORTISONE ACETATE 0.2 MILLIGRAM(S): 0.1 TABLET ORAL at 05:03

## 2023-01-01 RX ADMIN — CISATRACURIUM BESYLATE 13.1 MICROGRAM(S)/KG/MIN: 2 INJECTION INTRAVENOUS at 23:33

## 2023-01-01 RX ADMIN — SODIUM CHLORIDE 1000 MILLILITER(S): 9 INJECTION INTRAMUSCULAR; INTRAVENOUS; SUBCUTANEOUS at 00:02

## 2023-01-01 RX ADMIN — INSULIN GLARGINE 10 UNIT(S): 100 INJECTION, SOLUTION SUBCUTANEOUS at 23:22

## 2023-01-01 RX ADMIN — Medication 10 MILLIGRAM(S): at 13:39

## 2023-01-01 RX ADMIN — Medication 6: at 16:40

## 2023-01-01 RX ADMIN — MEROPENEM 100 MILLIGRAM(S): 1 INJECTION INTRAVENOUS at 06:32

## 2023-01-01 RX ADMIN — FENTANYL CITRATE 3.63 MICROGRAM(S)/KG/HR: 50 INJECTION INTRAVENOUS at 04:06

## 2023-01-01 RX ADMIN — SODIUM CHLORIDE 3 GRAM(S): 9 INJECTION INTRAMUSCULAR; INTRAVENOUS; SUBCUTANEOUS at 18:12

## 2023-01-01 RX ADMIN — ENOXAPARIN SODIUM 75 MILLIGRAM(S): 100 INJECTION SUBCUTANEOUS at 05:44

## 2023-01-01 RX ADMIN — Medication 40 MILLIEQUIVALENT(S): at 14:42

## 2023-01-01 RX ADMIN — Medication 1000 MILLIGRAM(S): at 03:36

## 2023-01-01 RX ADMIN — Medication 20 MILLIEQUIVALENT(S): at 16:00

## 2023-01-01 RX ADMIN — Medication 1 APPLICATION(S): at 14:45

## 2023-01-01 RX ADMIN — FLUDROCORTISONE ACETATE 0.2 MILLIGRAM(S): 0.1 TABLET ORAL at 11:38

## 2023-01-01 RX ADMIN — Medication 3 MILLILITER(S): at 06:14

## 2023-01-01 RX ADMIN — ENOXAPARIN SODIUM 70 MILLIGRAM(S): 100 INJECTION SUBCUTANEOUS at 06:11

## 2023-01-01 RX ADMIN — Medication 2 MILLIGRAM(S): at 17:23

## 2023-01-01 RX ADMIN — Medication 6.81 MICROGRAM(S)/KG/MIN: at 02:21

## 2023-01-01 RX ADMIN — Medication 125 MILLILITER(S): at 18:16

## 2023-01-01 RX ADMIN — HYDROMORPHONE HYDROCHLORIDE 0.25 MILLIGRAM(S): 2 INJECTION INTRAMUSCULAR; INTRAVENOUS; SUBCUTANEOUS at 14:57

## 2023-01-01 RX ADMIN — Medication 1 SPRAY(S): at 18:11

## 2023-01-01 RX ADMIN — ESCITALOPRAM OXALATE 10 MILLIGRAM(S): 10 TABLET, FILM COATED ORAL at 12:35

## 2023-01-01 RX ADMIN — SODIUM CHLORIDE 3 GRAM(S): 9 INJECTION INTRAMUSCULAR; INTRAVENOUS; SUBCUTANEOUS at 17:15

## 2023-01-01 RX ADMIN — SODIUM CHLORIDE 120 MILLILITER(S): 9 INJECTION INTRAMUSCULAR; INTRAVENOUS; SUBCUTANEOUS at 02:34

## 2023-01-01 RX ADMIN — SODIUM CHLORIDE 75 MILLILITER(S): 5 INJECTION, SOLUTION INTRAVENOUS at 02:25

## 2023-01-01 RX ADMIN — Medication 4 MILLIGRAM(S): at 05:28

## 2023-01-01 RX ADMIN — PANTOPRAZOLE SODIUM 40 MILLIGRAM(S): 20 TABLET, DELAYED RELEASE ORAL at 05:05

## 2023-01-01 RX ADMIN — Medication 100 MILLIEQUIVALENT(S): at 07:30

## 2023-01-01 RX ADMIN — CHLORHEXIDINE GLUCONATE 15 MILLILITER(S): 213 SOLUTION TOPICAL at 17:39

## 2023-01-01 RX ADMIN — Medication 25 GRAM(S): at 11:36

## 2023-01-01 RX ADMIN — Medication 40 MILLIEQUIVALENT(S): at 10:07

## 2023-01-01 RX ADMIN — ATORVASTATIN CALCIUM 20 MILLIGRAM(S): 80 TABLET, FILM COATED ORAL at 21:11

## 2023-01-01 RX ADMIN — ONDANSETRON 4 MILLIGRAM(S): 8 TABLET, FILM COATED ORAL at 16:44

## 2023-01-01 RX ADMIN — Medication 2: at 11:47

## 2023-01-01 RX ADMIN — Medication 5 MILLIGRAM(S): at 21:31

## 2023-01-01 RX ADMIN — Medication 1 SPRAY(S): at 06:43

## 2023-01-01 RX ADMIN — Medication 25 GRAM(S): at 07:20

## 2023-01-01 RX ADMIN — Medication 50 MILLIEQUIVALENT(S): at 03:52

## 2023-01-01 RX ADMIN — PANTOPRAZOLE SODIUM 10 MG/HR: 20 TABLET, DELAYED RELEASE ORAL at 18:31

## 2023-01-01 RX ADMIN — ATORVASTATIN CALCIUM 20 MILLIGRAM(S): 80 TABLET, FILM COATED ORAL at 22:56

## 2023-01-01 RX ADMIN — ATORVASTATIN CALCIUM 20 MILLIGRAM(S): 80 TABLET, FILM COATED ORAL at 22:18

## 2023-01-01 RX ADMIN — CHLORHEXIDINE GLUCONATE 15 MILLILITER(S): 213 SOLUTION TOPICAL at 17:56

## 2023-01-01 RX ADMIN — CISATRACURIUM BESYLATE 13.1 MICROGRAM(S)/KG/MIN: 2 INJECTION INTRAVENOUS at 22:03

## 2023-01-01 RX ADMIN — Medication 1 GRAM(S): at 11:56

## 2023-01-01 RX ADMIN — Medication 4: at 22:05

## 2023-01-01 RX ADMIN — Medication 100 MILLIGRAM(S): at 06:01

## 2023-01-01 RX ADMIN — SODIUM CHLORIDE 3 GRAM(S): 9 INJECTION INTRAMUSCULAR; INTRAVENOUS; SUBCUTANEOUS at 17:50

## 2023-01-01 RX ADMIN — Medication 3 MILLILITER(S): at 21:11

## 2023-01-01 RX ADMIN — LEVETIRACETAM 750 MILLIGRAM(S): 250 TABLET, FILM COATED ORAL at 05:47

## 2023-01-01 RX ADMIN — PROPOFOL 2.18 MICROGRAM(S)/KG/MIN: 10 INJECTION, EMULSION INTRAVENOUS at 15:55

## 2023-01-01 RX ADMIN — ATORVASTATIN CALCIUM 20 MILLIGRAM(S): 80 TABLET, FILM COATED ORAL at 21:58

## 2023-01-01 RX ADMIN — SODIUM CHLORIDE 4 MILLILITER(S): 9 INJECTION INTRAMUSCULAR; INTRAVENOUS; SUBCUTANEOUS at 21:46

## 2023-01-01 RX ADMIN — CHLORHEXIDINE GLUCONATE 15 MILLILITER(S): 213 SOLUTION TOPICAL at 17:35

## 2023-01-01 RX ADMIN — FENTANYL CITRATE 25 MICROGRAM(S): 50 INJECTION INTRAVENOUS at 15:44

## 2023-01-01 RX ADMIN — SODIUM CHLORIDE 2 GRAM(S): 9 INJECTION INTRAMUSCULAR; INTRAVENOUS; SUBCUTANEOUS at 05:10

## 2023-01-01 RX ADMIN — Medication 25 GRAM(S): at 23:54

## 2023-01-01 RX ADMIN — Medication 4 MILLILITER(S): at 09:48

## 2023-01-01 RX ADMIN — Medication 1000 MILLIGRAM(S): at 17:23

## 2023-01-01 RX ADMIN — Medication 25 MILLILITER(S): at 08:15

## 2023-01-01 RX ADMIN — Medication 50 MILLIEQUIVALENT(S): at 04:46

## 2023-01-01 RX ADMIN — Medication 650 MILLIGRAM(S): at 21:58

## 2023-01-01 RX ADMIN — Medication 2 MILLIGRAM(S): at 18:54

## 2023-01-01 RX ADMIN — SODIUM CHLORIDE 2 GRAM(S): 9 INJECTION INTRAMUSCULAR; INTRAVENOUS; SUBCUTANEOUS at 17:20

## 2023-01-01 RX ADMIN — INSULIN HUMAN 1: 100 INJECTION, SOLUTION SUBCUTANEOUS at 17:38

## 2023-01-01 RX ADMIN — LACOSAMIDE 110 MILLIGRAM(S): 50 TABLET ORAL at 07:39

## 2023-01-01 RX ADMIN — Medication 1000 MILLIGRAM(S): at 10:21

## 2023-01-01 RX ADMIN — Medication 1 TABLET(S): at 11:45

## 2023-01-01 RX ADMIN — Medication 4 MILLILITER(S): at 09:12

## 2023-01-01 RX ADMIN — SODIUM CHLORIDE 3 GRAM(S): 9 INJECTION INTRAMUSCULAR; INTRAVENOUS; SUBCUTANEOUS at 23:37

## 2023-01-01 RX ADMIN — FLUDROCORTISONE ACETATE 0.2 MILLIGRAM(S): 0.1 TABLET ORAL at 11:47

## 2023-01-01 RX ADMIN — Medication 650 MILLIGRAM(S): at 07:58

## 2023-01-01 RX ADMIN — Medication 2: at 06:54

## 2023-01-01 RX ADMIN — ESCITALOPRAM OXALATE 5 MILLIGRAM(S): 10 TABLET, FILM COATED ORAL at 11:42

## 2023-01-01 RX ADMIN — LEVETIRACETAM 400 MILLIGRAM(S): 250 TABLET, FILM COATED ORAL at 17:52

## 2023-01-01 RX ADMIN — Medication 2 MILLIGRAM(S): at 17:53

## 2023-01-01 RX ADMIN — Medication 1 SPRAY(S): at 19:39

## 2023-01-01 RX ADMIN — LEVETIRACETAM 1000 MILLIGRAM(S): 250 TABLET, FILM COATED ORAL at 18:06

## 2023-01-01 RX ADMIN — Medication 3 MILLILITER(S): at 16:46

## 2023-01-01 RX ADMIN — Medication 4 MILLIGRAM(S): at 17:16

## 2023-01-01 RX ADMIN — INSULIN HUMAN 5 UNIT(S): 100 INJECTION, SOLUTION SUBCUTANEOUS at 23:00

## 2023-01-01 RX ADMIN — Medication 1 SPRAY(S): at 19:04

## 2023-01-01 RX ADMIN — MAGNESIUM OXIDE 400 MG ORAL TABLET 400 MILLIGRAM(S): 241.3 TABLET ORAL at 07:24

## 2023-01-01 RX ADMIN — SODIUM CHLORIDE 1000 MILLILITER(S): 9 INJECTION INTRAMUSCULAR; INTRAVENOUS; SUBCUTANEOUS at 01:21

## 2023-01-01 RX ADMIN — Medication 20 MILLIGRAM(S): at 10:36

## 2023-01-01 RX ADMIN — Medication 1 PACKET(S): at 08:48

## 2023-01-01 RX ADMIN — CEFEPIME 100 MILLIGRAM(S): 1 INJECTION, POWDER, FOR SOLUTION INTRAMUSCULAR; INTRAVENOUS at 17:37

## 2023-01-01 RX ADMIN — SODIUM CHLORIDE 120 MILLILITER(S): 9 INJECTION INTRAMUSCULAR; INTRAVENOUS; SUBCUTANEOUS at 07:52

## 2023-01-01 RX ADMIN — LACOSAMIDE 110 MILLIGRAM(S): 50 TABLET ORAL at 06:09

## 2023-01-01 RX ADMIN — SODIUM CHLORIDE 2 GRAM(S): 9 INJECTION INTRAMUSCULAR; INTRAVENOUS; SUBCUTANEOUS at 23:34

## 2023-01-01 RX ADMIN — INSULIN HUMAN 2: 100 INJECTION, SOLUTION SUBCUTANEOUS at 23:21

## 2023-01-01 RX ADMIN — Medication 25 GRAM(S): at 14:38

## 2023-01-01 RX ADMIN — Medication 4: at 19:28

## 2023-01-01 RX ADMIN — Medication 4 MILLIGRAM(S): at 17:23

## 2023-01-01 RX ADMIN — Medication 100 MILLIGRAM(S): at 22:07

## 2023-01-01 RX ADMIN — MEROPENEM 100 MILLIGRAM(S): 1 INJECTION INTRAVENOUS at 19:29

## 2023-01-01 RX ADMIN — Medication 3 MILLIGRAM(S): at 11:24

## 2023-01-01 RX ADMIN — LACOSAMIDE 110 MILLIGRAM(S): 50 TABLET ORAL at 18:47

## 2023-01-01 RX ADMIN — Medication 25 GRAM(S): at 11:01

## 2023-01-01 RX ADMIN — SODIUM CHLORIDE 1000 MILLILITER(S): 9 INJECTION INTRAMUSCULAR; INTRAVENOUS; SUBCUTANEOUS at 19:44

## 2023-01-01 RX ADMIN — SODIUM CHLORIDE 2 GRAM(S): 9 INJECTION INTRAMUSCULAR; INTRAVENOUS; SUBCUTANEOUS at 11:24

## 2023-01-01 RX ADMIN — SODIUM CHLORIDE 3 GRAM(S): 9 INJECTION INTRAMUSCULAR; INTRAVENOUS; SUBCUTANEOUS at 17:35

## 2023-01-01 RX ADMIN — Medication 1 SPRAY(S): at 07:37

## 2023-01-01 RX ADMIN — SACUBITRIL AND VALSARTAN 1 TABLET(S): 24; 26 TABLET, FILM COATED ORAL at 17:42

## 2023-01-01 RX ADMIN — AMIODARONE HYDROCHLORIDE 400 MILLIGRAM(S): 400 TABLET ORAL at 17:03

## 2023-01-01 RX ADMIN — CHLORHEXIDINE GLUCONATE 15 MILLILITER(S): 213 SOLUTION TOPICAL at 18:04

## 2023-01-01 RX ADMIN — Medication 62.5 MILLIMOLE(S): at 10:35

## 2023-01-01 RX ADMIN — Medication 4: at 16:32

## 2023-01-01 RX ADMIN — PANTOPRAZOLE SODIUM 10 MG/HR: 20 TABLET, DELAYED RELEASE ORAL at 19:24

## 2023-01-01 RX ADMIN — SODIUM CHLORIDE 2 GRAM(S): 9 INJECTION INTRAMUSCULAR; INTRAVENOUS; SUBCUTANEOUS at 11:50

## 2023-01-01 RX ADMIN — Medication 100 MILLIEQUIVALENT(S): at 04:36

## 2023-01-01 RX ADMIN — SODIUM CHLORIDE 2 GRAM(S): 9 INJECTION INTRAMUSCULAR; INTRAVENOUS; SUBCUTANEOUS at 05:11

## 2023-01-01 RX ADMIN — Medication 25 MILLIGRAM(S): at 06:11

## 2023-01-01 RX ADMIN — ATORVASTATIN CALCIUM 20 MILLIGRAM(S): 80 TABLET, FILM COATED ORAL at 23:08

## 2023-01-01 RX ADMIN — Medication 5 MILLIGRAM(S): at 22:40

## 2023-01-01 RX ADMIN — Medication 15 GRAM(S): at 06:31

## 2023-01-01 RX ADMIN — Medication 1 SPRAY(S): at 07:21

## 2023-01-01 RX ADMIN — FENTANYL CITRATE 50 MICROGRAM(S): 50 INJECTION INTRAVENOUS at 21:00

## 2023-01-01 RX ADMIN — APIXABAN 10 MILLIGRAM(S): 2.5 TABLET, FILM COATED ORAL at 06:16

## 2023-01-01 RX ADMIN — Medication 50 MILLIGRAM(S): at 18:47

## 2023-01-01 RX ADMIN — INSULIN HUMAN 2: 100 INJECTION, SOLUTION SUBCUTANEOUS at 13:14

## 2023-01-01 RX ADMIN — Medication 1 PACKET(S): at 06:08

## 2023-01-01 RX ADMIN — LEVETIRACETAM 1000 MILLIGRAM(S): 250 TABLET, FILM COATED ORAL at 05:58

## 2023-01-01 RX ADMIN — SODIUM CHLORIDE 4 MILLILITER(S): 9 INJECTION INTRAMUSCULAR; INTRAVENOUS; SUBCUTANEOUS at 18:45

## 2023-01-01 RX ADMIN — Medication 25 GRAM(S): at 17:44

## 2023-01-01 RX ADMIN — SODIUM CHLORIDE 25 MILLILITER(S): 5 INJECTION, SOLUTION INTRAVENOUS at 10:54

## 2023-01-01 RX ADMIN — SODIUM CHLORIDE 3 GRAM(S): 9 INJECTION INTRAMUSCULAR; INTRAVENOUS; SUBCUTANEOUS at 23:11

## 2023-01-01 RX ADMIN — SODIUM CHLORIDE 2 GRAM(S): 9 INJECTION INTRAMUSCULAR; INTRAVENOUS; SUBCUTANEOUS at 23:01

## 2023-01-01 RX ADMIN — ATORVASTATIN CALCIUM 20 MILLIGRAM(S): 80 TABLET, FILM COATED ORAL at 22:41

## 2023-01-01 RX ADMIN — Medication 75 MEQ/KG/HR: at 04:50

## 2023-01-01 RX ADMIN — LACOSAMIDE 110 MILLIGRAM(S): 50 TABLET ORAL at 06:34

## 2023-01-01 RX ADMIN — SENNA PLUS 2 TABLET(S): 8.6 TABLET ORAL at 22:47

## 2023-01-01 RX ADMIN — Medication 1 APPLICATION(S): at 15:19

## 2023-01-01 RX ADMIN — PROPOFOL 8.71 MICROGRAM(S)/KG/MIN: 10 INJECTION, EMULSION INTRAVENOUS at 10:34

## 2023-01-01 RX ADMIN — CEFTRIAXONE 100 MILLIGRAM(S): 500 INJECTION, POWDER, FOR SOLUTION INTRAMUSCULAR; INTRAVENOUS at 10:11

## 2023-01-01 RX ADMIN — FENTANYL CITRATE 25 MICROGRAM(S): 50 INJECTION INTRAVENOUS at 22:59

## 2023-01-01 RX ADMIN — PANTOPRAZOLE SODIUM 40 MILLIGRAM(S): 20 TABLET, DELAYED RELEASE ORAL at 17:41

## 2023-01-01 RX ADMIN — PANTOPRAZOLE SODIUM 40 MILLIGRAM(S): 20 TABLET, DELAYED RELEASE ORAL at 06:21

## 2023-01-01 RX ADMIN — Medication 4 MILLILITER(S): at 16:45

## 2023-01-01 RX ADMIN — SODIUM CHLORIDE 30 MILLILITER(S): 5 INJECTION, SOLUTION INTRAVENOUS at 09:20

## 2023-01-01 RX ADMIN — INSULIN GLARGINE 10 UNIT(S): 100 INJECTION, SOLUTION SUBCUTANEOUS at 23:04

## 2023-01-01 RX ADMIN — Medication 3 MILLILITER(S): at 11:59

## 2023-01-01 RX ADMIN — Medication 2: at 18:33

## 2023-01-01 RX ADMIN — Medication 100 MILLIEQUIVALENT(S): at 12:05

## 2023-01-01 RX ADMIN — INSULIN GLARGINE 10 UNIT(S): 100 INJECTION, SOLUTION SUBCUTANEOUS at 23:19

## 2023-01-01 RX ADMIN — FENTANYL CITRATE 50 MICROGRAM(S): 50 INJECTION INTRAVENOUS at 20:00

## 2023-01-01 RX ADMIN — Medication 4 MILLIGRAM(S): at 05:54

## 2023-01-01 RX ADMIN — AMIODARONE HYDROCHLORIDE 33.3 MG/MIN: 400 TABLET ORAL at 07:08

## 2023-01-01 RX ADMIN — SODIUM CHLORIDE 3 GRAM(S): 9 INJECTION INTRAMUSCULAR; INTRAVENOUS; SUBCUTANEOUS at 00:51

## 2023-01-01 RX ADMIN — ATORVASTATIN CALCIUM 20 MILLIGRAM(S): 80 TABLET, FILM COATED ORAL at 23:21

## 2023-01-01 RX ADMIN — Medication 3 MILLILITER(S): at 06:35

## 2023-01-01 RX ADMIN — Medication 650 MILLIGRAM(S): at 17:36

## 2023-01-01 RX ADMIN — Medication 2 MILLIGRAM(S): at 17:14

## 2023-01-01 RX ADMIN — BENZOCAINE AND MENTHOL 1 LOZENGE: 5; 1 LIQUID ORAL at 17:38

## 2023-01-01 RX ADMIN — Medication 650 MILLIGRAM(S): at 13:05

## 2023-01-01 RX ADMIN — Medication 1 GRAM(S): at 12:30

## 2023-01-01 RX ADMIN — ENOXAPARIN SODIUM 70 MILLIGRAM(S): 100 INJECTION SUBCUTANEOUS at 17:37

## 2023-01-01 RX ADMIN — Medication 5 MILLIGRAM(S): at 11:10

## 2023-01-01 RX ADMIN — ONDANSETRON 4 MILLIGRAM(S): 8 TABLET, FILM COATED ORAL at 00:33

## 2023-01-01 RX ADMIN — Medication 4 MILLILITER(S): at 06:28

## 2023-01-01 RX ADMIN — SACUBITRIL AND VALSARTAN 1 TABLET(S): 24; 26 TABLET, FILM COATED ORAL at 05:37

## 2023-01-01 RX ADMIN — FENTANYL CITRATE 25 MICROGRAM(S): 50 INJECTION INTRAVENOUS at 12:19

## 2023-01-01 RX ADMIN — Medication 100 GRAM(S): at 08:53

## 2023-01-01 RX ADMIN — Medication 1 SPRAY(S): at 05:56

## 2023-01-01 RX ADMIN — INSULIN HUMAN 4 UNIT(S): 100 INJECTION, SOLUTION SUBCUTANEOUS at 19:03

## 2023-01-01 RX ADMIN — Medication 3 MILLILITER(S): at 21:20

## 2023-01-01 RX ADMIN — CEFEPIME 100 MILLIGRAM(S): 1 INJECTION, POWDER, FOR SOLUTION INTRAMUSCULAR; INTRAVENOUS at 09:41

## 2023-01-01 RX ADMIN — Medication 1 SPRAY(S): at 05:06

## 2023-01-01 RX ADMIN — LEVETIRACETAM 750 MILLIGRAM(S): 250 TABLET, FILM COATED ORAL at 15:51

## 2023-01-01 RX ADMIN — SODIUM CHLORIDE 2 GRAM(S): 9 INJECTION INTRAMUSCULAR; INTRAVENOUS; SUBCUTANEOUS at 12:08

## 2023-01-01 RX ADMIN — Medication 3 MILLILITER(S): at 10:32

## 2023-01-01 RX ADMIN — Medication 6 MILLIGRAM(S): at 21:07

## 2023-01-01 RX ADMIN — Medication 2 MILLIGRAM(S): at 06:22

## 2023-01-01 RX ADMIN — Medication 25 MILLIGRAM(S): at 21:39

## 2023-01-01 RX ADMIN — Medication 2 MILLIGRAM(S): at 17:49

## 2023-01-01 RX ADMIN — Medication 2: at 21:05

## 2023-01-01 RX ADMIN — INSULIN HUMAN 1: 100 INJECTION, SOLUTION SUBCUTANEOUS at 19:04

## 2023-01-01 RX ADMIN — Medication 650 MILLIGRAM(S): at 18:06

## 2023-01-01 RX ADMIN — CHLORHEXIDINE GLUCONATE 15 MILLILITER(S): 213 SOLUTION TOPICAL at 17:31

## 2023-01-01 RX ADMIN — Medication 4 MILLIGRAM(S): at 06:45

## 2023-01-01 RX ADMIN — PROPOFOL 4.27 MICROGRAM(S)/KG/MIN: 10 INJECTION, EMULSION INTRAVENOUS at 03:30

## 2023-01-01 RX ADMIN — CHLORHEXIDINE GLUCONATE 15 MILLILITER(S): 213 SOLUTION TOPICAL at 05:25

## 2023-01-01 RX ADMIN — Medication 650 MILLIGRAM(S): at 10:17

## 2023-01-01 RX ADMIN — Medication 1 APPLICATION(S): at 18:59

## 2023-01-01 RX ADMIN — CISATRACURIUM BESYLATE 5 MILLIGRAM(S): 2 INJECTION INTRAVENOUS at 20:15

## 2023-01-01 RX ADMIN — PANTOPRAZOLE SODIUM 40 MILLIGRAM(S): 20 TABLET, DELAYED RELEASE ORAL at 06:25

## 2023-01-01 RX ADMIN — ESCITALOPRAM OXALATE 5 MILLIGRAM(S): 10 TABLET, FILM COATED ORAL at 11:52

## 2023-01-01 RX ADMIN — Medication 1 APPLICATION(S): at 11:36

## 2023-01-01 RX ADMIN — Medication 1 SPRAY(S): at 17:17

## 2023-01-01 RX ADMIN — Medication 4 MILLIGRAM(S): at 17:04

## 2023-01-01 RX ADMIN — Medication 4 MILLILITER(S): at 16:47

## 2023-01-01 RX ADMIN — CHLORHEXIDINE GLUCONATE 1 APPLICATION(S): 213 SOLUTION TOPICAL at 06:30

## 2023-01-01 RX ADMIN — Medication 3.4 MICROGRAM(S)/KG/MIN: at 15:55

## 2023-01-01 RX ADMIN — Medication 1 APPLICATION(S): at 23:46

## 2023-01-01 RX ADMIN — SENNA PLUS 2 TABLET(S): 8.6 TABLET ORAL at 22:41

## 2023-01-01 RX ADMIN — ATORVASTATIN CALCIUM 20 MILLIGRAM(S): 80 TABLET, FILM COATED ORAL at 21:04

## 2023-01-01 RX ADMIN — SODIUM CHLORIDE 3 GRAM(S): 9 INJECTION INTRAMUSCULAR; INTRAVENOUS; SUBCUTANEOUS at 23:26

## 2023-01-01 RX ADMIN — Medication 1 GRAM(S): at 05:57

## 2023-01-01 RX ADMIN — Medication 1 SPRAY(S): at 17:32

## 2023-01-01 RX ADMIN — POTASSIUM PHOSPHATE, MONOBASIC POTASSIUM PHOSPHATE, DIBASIC 62.5 MILLIMOLE(S): 236; 224 INJECTION, SOLUTION INTRAVENOUS at 10:11

## 2023-01-01 RX ADMIN — CHLORHEXIDINE GLUCONATE 1 APPLICATION(S): 213 SOLUTION TOPICAL at 05:46

## 2023-01-01 RX ADMIN — PROPOFOL 21.8 MICROGRAM(S)/KG/MIN: 10 INJECTION, EMULSION INTRAVENOUS at 17:09

## 2023-01-01 RX ADMIN — Medication 400 MILLIGRAM(S): at 17:37

## 2023-01-01 RX ADMIN — Medication 40 MILLIEQUIVALENT(S): at 22:45

## 2023-01-01 RX ADMIN — LEVETIRACETAM 750 MILLIGRAM(S): 250 TABLET, FILM COATED ORAL at 05:09

## 2023-01-01 RX ADMIN — ENOXAPARIN SODIUM 40 MILLIGRAM(S): 100 INJECTION SUBCUTANEOUS at 21:09

## 2023-01-01 RX ADMIN — Medication 20 MILLIGRAM(S): at 09:25

## 2023-01-01 RX ADMIN — SODIUM CHLORIDE 2 GRAM(S): 9 INJECTION INTRAMUSCULAR; INTRAVENOUS; SUBCUTANEOUS at 11:22

## 2023-01-01 RX ADMIN — PIPERACILLIN AND TAZOBACTAM 25 GRAM(S): 4; .5 INJECTION, POWDER, LYOPHILIZED, FOR SOLUTION INTRAVENOUS at 21:41

## 2023-01-01 RX ADMIN — Medication 2 MILLIGRAM(S): at 06:01

## 2023-01-01 RX ADMIN — Medication 1 SPRAY(S): at 18:13

## 2023-01-01 RX ADMIN — IRON SUCROSE 176.67 MILLIGRAM(S): 20 INJECTION, SOLUTION INTRAVENOUS at 21:04

## 2023-01-01 RX ADMIN — LEVETIRACETAM 750 MILLIGRAM(S): 250 TABLET, FILM COATED ORAL at 17:32

## 2023-01-01 RX ADMIN — SODIUM CHLORIDE 2 GRAM(S): 9 INJECTION INTRAMUSCULAR; INTRAVENOUS; SUBCUTANEOUS at 06:16

## 2023-01-01 RX ADMIN — Medication 25 GRAM(S): at 11:07

## 2023-01-01 RX ADMIN — Medication 1 GRAM(S): at 05:22

## 2023-01-01 RX ADMIN — Medication 8: at 11:35

## 2023-01-01 RX ADMIN — Medication 40 MILLIEQUIVALENT(S): at 05:09

## 2023-01-01 RX ADMIN — LACOSAMIDE 110 MILLIGRAM(S): 50 TABLET ORAL at 06:14

## 2023-01-01 RX ADMIN — Medication 4: at 23:45

## 2023-01-01 RX ADMIN — CHLORHEXIDINE GLUCONATE 15 MILLILITER(S): 213 SOLUTION TOPICAL at 05:46

## 2023-01-01 RX ADMIN — FENTANYL CITRATE 3.63 MICROGRAM(S)/KG/HR: 50 INJECTION INTRAVENOUS at 17:37

## 2023-01-01 RX ADMIN — Medication 650 MILLIGRAM(S): at 22:20

## 2023-01-01 RX ADMIN — Medication 1 TABLET(S): at 12:29

## 2023-01-01 RX ADMIN — PROPOFOL 21.8 MICROGRAM(S)/KG/MIN: 10 INJECTION, EMULSION INTRAVENOUS at 08:33

## 2023-01-01 RX ADMIN — Medication 50 MILLIGRAM(S): at 09:41

## 2023-01-01 RX ADMIN — Medication 3 MILLILITER(S): at 14:25

## 2023-01-01 RX ADMIN — Medication 3 MILLILITER(S): at 15:51

## 2023-01-01 RX ADMIN — Medication 2: at 07:05

## 2023-01-01 RX ADMIN — Medication 25 MILLIGRAM(S): at 22:10

## 2023-01-01 RX ADMIN — SACUBITRIL AND VALSARTAN 1 TABLET(S): 24; 26 TABLET, FILM COATED ORAL at 18:11

## 2023-01-01 RX ADMIN — POLYETHYLENE GLYCOL 3350 17 GRAM(S): 17 POWDER, FOR SOLUTION ORAL at 17:05

## 2023-01-01 RX ADMIN — INSULIN HUMAN 1: 100 INJECTION, SOLUTION SUBCUTANEOUS at 07:00

## 2023-01-01 RX ADMIN — Medication 4 MILLILITER(S): at 10:58

## 2023-01-01 RX ADMIN — INSULIN HUMAN 5 UNIT(S): 100 INJECTION, SOLUTION SUBCUTANEOUS at 06:10

## 2023-01-01 RX ADMIN — LEVETIRACETAM 750 MILLIGRAM(S): 250 TABLET, FILM COATED ORAL at 05:44

## 2023-01-01 RX ADMIN — PANTOPRAZOLE SODIUM 40 MILLIGRAM(S): 20 TABLET, DELAYED RELEASE ORAL at 05:39

## 2023-01-01 RX ADMIN — Medication 100 MILLIGRAM(S): at 22:57

## 2023-01-01 RX ADMIN — LEVETIRACETAM 750 MILLIGRAM(S): 250 TABLET, FILM COATED ORAL at 17:30

## 2023-01-01 RX ADMIN — LACOSAMIDE 110 MILLIGRAM(S): 50 TABLET ORAL at 18:31

## 2023-01-01 RX ADMIN — SODIUM CHLORIDE 4 MILLILITER(S): 9 INJECTION INTRAMUSCULAR; INTRAVENOUS; SUBCUTANEOUS at 17:07

## 2023-01-01 RX ADMIN — SODIUM CHLORIDE 3 GRAM(S): 9 INJECTION INTRAMUSCULAR; INTRAVENOUS; SUBCUTANEOUS at 11:22

## 2023-01-01 RX ADMIN — SODIUM CHLORIDE 2 GRAM(S): 9 INJECTION INTRAMUSCULAR; INTRAVENOUS; SUBCUTANEOUS at 23:44

## 2023-01-01 RX ADMIN — LEVETIRACETAM 400 MILLIGRAM(S): 250 TABLET, FILM COATED ORAL at 06:49

## 2023-01-01 RX ADMIN — DEXMEDETOMIDINE HYDROCHLORIDE IN 0.9% SODIUM CHLORIDE 3.63 MICROGRAM(S)/KG/HR: 4 INJECTION INTRAVENOUS at 23:54

## 2023-01-01 RX ADMIN — INSULIN GLARGINE 5 UNIT(S): 100 INJECTION, SOLUTION SUBCUTANEOUS at 22:37

## 2023-01-01 RX ADMIN — Medication 2 TABLET(S): at 23:22

## 2023-01-01 RX ADMIN — FENTANYL CITRATE 25 MICROGRAM(S): 50 INJECTION INTRAVENOUS at 13:54

## 2023-01-01 RX ADMIN — Medication 650 MILLIGRAM(S): at 11:58

## 2023-01-01 RX ADMIN — Medication 4 MILLILITER(S): at 21:45

## 2023-01-01 RX ADMIN — AMIODARONE HYDROCHLORIDE 400 MILLIGRAM(S): 400 TABLET ORAL at 05:25

## 2023-01-01 RX ADMIN — CHLORHEXIDINE GLUCONATE 1 APPLICATION(S): 213 SOLUTION TOPICAL at 05:37

## 2023-01-01 RX ADMIN — Medication 4: at 22:12

## 2023-01-01 RX ADMIN — Medication 4 MILLIGRAM(S): at 18:00

## 2023-01-01 RX ADMIN — FENTANYL CITRATE 25 MICROGRAM(S): 50 INJECTION INTRAVENOUS at 23:11

## 2023-01-01 RX ADMIN — Medication 1 SPRAY(S): at 17:16

## 2023-01-01 RX ADMIN — Medication 100 MILLIGRAM(S): at 21:43

## 2023-01-01 RX ADMIN — PANTOPRAZOLE SODIUM 40 MILLIGRAM(S): 20 TABLET, DELAYED RELEASE ORAL at 17:35

## 2023-01-01 RX ADMIN — Medication 40 MILLIEQUIVALENT(S): at 07:21

## 2023-01-01 RX ADMIN — LEVETIRACETAM 750 MILLIGRAM(S): 250 TABLET, FILM COATED ORAL at 17:31

## 2023-01-01 RX ADMIN — ENOXAPARIN SODIUM 70 MILLIGRAM(S): 100 INJECTION SUBCUTANEOUS at 17:00

## 2023-01-01 RX ADMIN — Medication 1 APPLICATION(S): at 06:29

## 2023-01-01 RX ADMIN — CHLORHEXIDINE GLUCONATE 1 APPLICATION(S): 213 SOLUTION TOPICAL at 05:29

## 2023-01-01 RX ADMIN — Medication 1 GRAM(S): at 23:01

## 2023-01-01 RX ADMIN — FLUDROCORTISONE ACETATE 0.1 MILLIGRAM(S): 0.1 TABLET ORAL at 06:46

## 2023-01-01 RX ADMIN — PANTOPRAZOLE SODIUM 10 MG/HR: 20 TABLET, DELAYED RELEASE ORAL at 23:53

## 2023-01-01 RX ADMIN — Medication 40 MILLIEQUIVALENT(S): at 23:22

## 2023-01-01 RX ADMIN — SODIUM CHLORIDE 3 GRAM(S): 9 INJECTION INTRAMUSCULAR; INTRAVENOUS; SUBCUTANEOUS at 11:11

## 2023-01-01 RX ADMIN — Medication 4 MILLILITER(S): at 11:11

## 2023-01-01 RX ADMIN — Medication 1 GRAM(S): at 06:00

## 2023-01-01 RX ADMIN — Medication 1 SPRAY(S): at 18:34

## 2023-01-01 RX ADMIN — PANTOPRAZOLE SODIUM 40 MILLIGRAM(S): 20 TABLET, DELAYED RELEASE ORAL at 05:37

## 2023-01-01 RX ADMIN — Medication 1 APPLICATION(S): at 12:24

## 2023-01-01 RX ADMIN — SACUBITRIL AND VALSARTAN 1 TABLET(S): 24; 26 TABLET, FILM COATED ORAL at 05:38

## 2023-01-01 RX ADMIN — Medication 102 MILLIGRAM(S): at 12:19

## 2023-01-01 RX ADMIN — Medication 100 GRAM(S): at 22:13

## 2023-01-01 RX ADMIN — SODIUM CHLORIDE 3 GRAM(S): 9 INJECTION INTRAMUSCULAR; INTRAVENOUS; SUBCUTANEOUS at 11:32

## 2023-01-01 RX ADMIN — Medication 1 PACKET(S): at 10:12

## 2023-01-01 RX ADMIN — Medication 40 MILLIEQUIVALENT(S): at 19:05

## 2023-01-01 RX ADMIN — Medication 102 MILLIGRAM(S): at 12:14

## 2023-01-01 RX ADMIN — PHENYLEPHRINE HYDROCHLORIDE 2.72 MICROGRAM(S)/KG/MIN: 10 INJECTION INTRAVENOUS at 06:51

## 2023-01-01 RX ADMIN — Medication 125 MILLILITER(S): at 21:37

## 2023-01-01 RX ADMIN — Medication 1000 MILLIGRAM(S): at 23:00

## 2023-01-01 RX ADMIN — Medication 1 SPRAY(S): at 05:15

## 2023-01-01 RX ADMIN — ESCITALOPRAM OXALATE 5 MILLIGRAM(S): 10 TABLET, FILM COATED ORAL at 12:33

## 2023-01-01 RX ADMIN — SODIUM CHLORIDE 2 GRAM(S): 9 INJECTION INTRAMUSCULAR; INTRAVENOUS; SUBCUTANEOUS at 05:30

## 2023-01-01 RX ADMIN — LEVETIRACETAM 400 MILLIGRAM(S): 250 TABLET, FILM COATED ORAL at 05:25

## 2023-01-01 RX ADMIN — SODIUM CHLORIDE 75 MILLILITER(S): 5 INJECTION, SOLUTION INTRAVENOUS at 21:04

## 2023-01-01 RX ADMIN — ATORVASTATIN CALCIUM 20 MILLIGRAM(S): 80 TABLET, FILM COATED ORAL at 21:09

## 2023-01-01 RX ADMIN — Medication 6.81 MICROGRAM(S)/KG/MIN: at 23:27

## 2023-01-01 RX ADMIN — Medication 12.5 MILLIGRAM(S): at 17:32

## 2023-01-01 RX ADMIN — Medication 10 MILLIGRAM(S): at 06:25

## 2023-01-01 RX ADMIN — LACOSAMIDE 110 MILLIGRAM(S): 50 TABLET ORAL at 18:16

## 2023-01-01 RX ADMIN — SODIUM CHLORIDE 3 GRAM(S): 9 INJECTION INTRAMUSCULAR; INTRAVENOUS; SUBCUTANEOUS at 00:04

## 2023-01-01 RX ADMIN — LACOSAMIDE 110 MILLIGRAM(S): 50 TABLET ORAL at 18:28

## 2023-01-01 RX ADMIN — INSULIN GLARGINE 18 UNIT(S): 100 INJECTION, SOLUTION SUBCUTANEOUS at 22:39

## 2023-01-01 RX ADMIN — FENTANYL CITRATE 25 MICROGRAM(S): 50 INJECTION INTRAVENOUS at 09:45

## 2023-01-01 RX ADMIN — Medication 85 MILLIMOLE(S): at 07:51

## 2023-01-01 RX ADMIN — CHLORHEXIDINE GLUCONATE 15 MILLILITER(S): 213 SOLUTION TOPICAL at 17:46

## 2023-01-01 RX ADMIN — INSULIN HUMAN 1: 100 INJECTION, SOLUTION SUBCUTANEOUS at 12:07

## 2023-01-01 RX ADMIN — SODIUM CHLORIDE 15 MILLILITER(S): 5 INJECTION, SOLUTION INTRAVENOUS at 17:36

## 2023-01-01 RX ADMIN — SODIUM CHLORIDE 4 MILLILITER(S): 9 INJECTION INTRAMUSCULAR; INTRAVENOUS; SUBCUTANEOUS at 20:47

## 2023-01-01 RX ADMIN — PANTOPRAZOLE SODIUM 10 MG/HR: 20 TABLET, DELAYED RELEASE ORAL at 18:28

## 2023-01-01 RX ADMIN — SODIUM CHLORIDE 500 MILLILITER(S): 9 INJECTION INTRAMUSCULAR; INTRAVENOUS; SUBCUTANEOUS at 16:30

## 2023-01-01 RX ADMIN — Medication 650 MILLIGRAM(S): at 16:30

## 2023-01-01 RX ADMIN — ENOXAPARIN SODIUM 70 MILLIGRAM(S): 100 INJECTION SUBCUTANEOUS at 05:29

## 2023-01-01 RX ADMIN — INSULIN GLARGINE 10 UNIT(S): 100 INJECTION, SOLUTION SUBCUTANEOUS at 00:15

## 2023-01-01 RX ADMIN — Medication 25 GRAM(S): at 19:01

## 2023-01-01 RX ADMIN — Medication 4 MILLIGRAM(S): at 17:35

## 2023-01-01 RX ADMIN — Medication 2: at 23:29

## 2023-01-01 RX ADMIN — AMIODARONE HYDROCHLORIDE 400 MILLIGRAM(S): 400 TABLET ORAL at 05:21

## 2023-01-01 RX ADMIN — Medication 10 MILLIGRAM(S): at 05:54

## 2023-01-01 RX ADMIN — LEVETIRACETAM 400 MILLIGRAM(S): 250 TABLET, FILM COATED ORAL at 19:04

## 2023-01-01 RX ADMIN — Medication 25 MILLILITER(S): at 06:34

## 2023-01-01 RX ADMIN — SODIUM CHLORIDE 30 MILLILITER(S): 5 INJECTION, SOLUTION INTRAVENOUS at 18:13

## 2023-01-01 RX ADMIN — Medication 10 MILLIGRAM(S): at 10:40

## 2023-01-01 RX ADMIN — PANTOPRAZOLE SODIUM 40 MILLIGRAM(S): 20 TABLET, DELAYED RELEASE ORAL at 05:56

## 2023-01-01 RX ADMIN — SACUBITRIL AND VALSARTAN 1 TABLET(S): 24; 26 TABLET, FILM COATED ORAL at 05:47

## 2023-01-01 RX ADMIN — CHLORHEXIDINE GLUCONATE 1 APPLICATION(S): 213 SOLUTION TOPICAL at 06:15

## 2023-01-17 PROBLEM — Z71.85 IMMUNIZATION COUNSELING: Status: ACTIVE | Noted: 2023-01-01

## 2023-01-17 PROBLEM — Z13.228 SCREENING FOR METABOLIC DISORDER: Status: ACTIVE | Noted: 2022-03-24

## 2023-01-17 PROBLEM — Z11.59 NEED FOR HEPATITIS C SCREENING TEST: Status: ACTIVE | Noted: 2023-01-01

## 2023-01-17 PROBLEM — H53.9 CHANGES IN VISION: Status: ACTIVE | Noted: 2022-01-01

## 2023-01-17 PROBLEM — N20.0 KIDNEY STONE: Status: ACTIVE | Noted: 2022-06-09

## 2023-01-17 PROBLEM — K59.09 CONSTIPATION, CHRONIC: Status: ACTIVE | Noted: 2022-06-16

## 2023-01-17 PROBLEM — R10.9 ABDOMINAL PAIN: Status: ACTIVE | Noted: 2022-04-26

## 2023-01-17 PROBLEM — R53.83 FATIGUE, UNSPECIFIED TYPE: Status: ACTIVE | Noted: 2022-01-01

## 2023-01-17 PROBLEM — Z23 ENCOUNTER FOR IMMUNIZATION: Status: ACTIVE | Noted: 2023-01-01

## 2023-01-17 NOTE — PHYSICAL EXAM
[Well Developed] : well developed [Well Nourished] : well nourished [No Acute Distress] : no acute distress [Normal Conjunctiva] : normal conjunctiva [Normal Venous Pressure] : normal venous pressure [No Carotid Bruit] : no carotid bruit [Normal S1, S2] : normal S1, S2 [No Murmur] : no murmur [No Rub] : no rub [No Gallop] : no gallop [Clear Lung Fields] : clear lung fields [Good Air Entry] : good air entry [No Respiratory Distress] : no respiratory distress  [Soft] : abdomen soft [Non Tender] : non-tender [No Masses/organomegaly] : no masses/organomegaly [Normal Bowel Sounds] : normal bowel sounds [Normal Gait] : normal gait [No Edema] : no edema [No Cyanosis] : no cyanosis [No Clubbing] : no clubbing [No Varicosities] : no varicosities [No Rash] : no rash [No Skin Lesions] : no skin lesions [Moves all extremities] : moves all extremities [No Focal Deficits] : no focal deficits [Normal Speech] : normal speech [Alert and Oriented] : alert and oriented [Normal memory] : normal memory [de-identified] : tracheostomy site noted and intact

## 2023-01-17 NOTE — PROCEDURE
[FreeTextEntry1] : Trach change, FOB, FFL.  [FreeTextEntry2] : Confirm placement of tracheostomy, chronic coughing, stomal granulation.  [FreeTextEntry3] : After patient gave consent, a FFL was performed. No lesions in the NPx, OPx, HPx or larynx. The VC seemed paretic, R > L, with limited abduction. A FOB was then performed and we replaced the trach in the site and confirmed placement and there is some mild granulation at the stomal site. No lesions in the trachea, no significant secretions. \par \par Scope: 210. \par

## 2023-01-17 NOTE — HISTORY OF PRESENT ILLNESS
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 Patient is accompanied by daughter. She reports he finished chemotherapy and will be having MRI 1/19/23 and will be seeing oncology 1/23/23. PAtient also reports constipation which he states could be from constipation. daughter reports he ran out of miralax, but does have relief with it\par \par Patient denies chest pain, dyspnea, palpitations, dizziness, syncope, changes in bowel/bladder habits or appetite.\par pt with ocassional fatigue

## 2023-01-17 NOTE — HISTORY OF PRESENT ILLNESS
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  RT completed on 5/31/22 and completed chemo 12/30/2022. Pt is here for trach change and continue coughing with mild mucus and pt over all improving. Pt is traveling and needs notes for traveling with trach care and suctions. \par \par

## 2023-01-17 NOTE — PHYSICAL EXAM
[Midline] : trachea located in midline position [Laryngoscopy Performed] : laryngoscopy was performed, see procedure section for findings [Normal] : no rashes [de-identified] : Size 6 cuffless Shilley in place, mild granulation.  No LAD.

## 2023-01-19 PROBLEM — E55.9 VITAMIN D DEFICIENCY: Status: ACTIVE | Noted: 2023-01-01

## 2023-01-23 NOTE — DISCUSSION/SUMMARY
[FreeTextEntry1] : I have reviewed his current MRI dated 1/19/2023 and have compared it to his prior scans from 12/2022 and 10/2022 - around his left high frontal operative cavity there is more notable enhancement both posterior and anterior left to the cavity - there is no significant change in flair abnormality.\par I have discussed the above with the patient and his daughter. I would like to present his case at our conference on Wednesday am and will be in touch with them Wednesday morning.

## 2023-01-23 NOTE — HISTORY OF PRESENT ILLNESS
[FreeTextEntry1] : 66 yo RH man with PMHx HTN, DM, glioblastoma (MGMT methylated), presents for follow-up. \par \par ONCOLOGIC HISTORY( Reviewed and copied from Dr Hill notes)\par 1/7/2022 - presented to [facility] in [facility] with headaches and cognitive changes and was diagnosed with brain tumor\par 1/24/2022 - presented to [facility] ED with complaints of confused speech (only states name to all questions asked)\par 1/27/2022 - s/p resection of LEFT frontal enhancing tumor with GLEOLAN placed.\par PATH: Gliosarcoma, no ATRX mutation on immunostains.\par 2/4/2022 - COVID testing NEGATIVE\par 2/8/2022 - d/c from [facility] to [place] rehab on salt tablets, insulin, and decadron \par 2/9/2022 - COVID POSITIVE, but thought to be FALSE positive as others negative\par 2/10/2022 - staples removed.\par 2/24/2022 - saw ENT for hoarse voice ( Dr. Graham Bergeron [phone])\par 2/28/2022 - returned to [facility] ED from rehab with complaints of difficulty swallowing and vocal cord injury. notes indicate, "Had NGtube during his stay that removed before he was discharged. Since the removal he has been experiencing throat pain and upper chest pain. Seen by ENT outpt and was noted to have vocal cord swelling and or irritation and sent in for CT. Having difficulty swallowing. 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.\par PATH: granulation, inflammation, no carcinoma.\par 3/26/22 - seen by Endocrine (IVY)\par 3/31/22 - seen by rad onc (CL[facility]TON) on lexapro 5mg daily.\par 4/4/22 - seen by surgery \par 4/18/22 - 5/30/22 - completed 6-week course of chemoRT\par 7/5/22 - tmz cycle #1, dose 250mg (150mg/m2) \par  8/6/22 - tmz cycle #2, dose 340mg (200mg/m2) \par 8/11/2022 - MRI stable \par 9/6/2022 - Reports Occasional twitching of left ue - hand - Keppra started on 6/13 - episodes reduced in frequency but still happening- daughter showed me a video from 8/19 - appeared to behaving rhythmic movements of right UE. Patient himself is unable to tell me whether he is aware of these events. Daughter reports compliance with medications. Raised Keppra to 750 mg bid\par 9/30/2022- 10/3- TMZ # 3 ( 340 MG x 4 DAYS sec to Thrombocytopenia)\par 10/19/2022 - MRI stable\par 11/25-11/29- Cycle # 5 \par 12/15/2022- MRI had shown increase enhancement (official read) at the posterior inferior margin of the left frontal operative cavity.\par Plan was to repeat scan at short interval as he was clinically stable. He had his 6th cycle of Temodar and is here for follow up. He offers no new complaints - denies any headaches, focal weakness, numbness, coordination or gait change.\par

## 2023-01-23 NOTE — PHYSICAL EXAM
[FreeTextEntry1] : He is awake and alert - oriented and fluent (Romansh)\par Trach collar in place.\par Follow commands\par EOMI, VFF\par Face symmetric and hearing is intact\par Tongue protrudes midline\par No drift noted\par FFM are equal bilaterally\par STrength full in UE and LE\par Aline and FFM are equal - FNF ok bilaterally\par Ambulating independently and steadily.

## 2023-02-04 NOTE — H&P ADULT - NSHPLABSRESULTS_GEN_ALL_CORE
SUMMARY: 63yo Malayalam-speaking woman w/ Myasthenia Gravis (on pyridostigmine, hospitalization every 1-2 years requiring IVIG) (dx 2015), s/p thymectomy (2010), T2DM who presents with SOB, cough, difficulty spitting up sputum x 4 days.  was sick with COVID, so son performed home COVID test for patient which was positive. Patient's primary complaint is SOB when lying down. She has been sleeping upright. She had difficulty coughing up and spitting out sputum but today these symptoms are slightly improved. Also reports difficulty swallowing - but was able to tolerate taking oral pills. Admits to difficulty looking up, but eye drooping is overall improved from past couple of months. No blurry vision. Patient takes pyridostigmine 120mg TID regularly. Patient also has prednisone PRN for sick days-- she has been taking 20mg prednisone x past 4 days as per instruction of her neurologist.     Of note, last hospitalization was 2 years ago at [facility] during which patient was intubated -- son notes that this was likely 2/2 patient being forced to lie down flat in hospital bed, leading to aspiration. She was extubated after ~3 days. Received IVIG during that hospitalization    OVERNIGHT EVENTS: Adm NSCU, upon arrival to the unit, appeared in acute respiratory distress tachypneic, hypertensive, tachycardic, hypoxic, ABG showing resp acidosis, immediately put on BiPAP, started on IVIG with some improvement     2/2- Still on IVIG, PLEX consented. No issues overnight.  2/3- Shiley was placed and was started on PLEX (2/2). No other events.  2/4- To get her second dose of PLEX today. No changes in exam.    ADMISSION SCORES:   GCS: 14 HH: MF: NIHSS: ICH Score:    REVIEW OF SYSTEMS: [] Unable to Assess due to neurologic exam   [ x] All ROS addressed below are non-contributory, except:  Neuro: [ ] Headache [ ] Back pain [ ] Numbness [ ] Weakness [ ] Ataxia [ ] Dizziness [ ] Aphasia [ ] Dysarthria [ ] Visual disturbance  Resp: [ x] Shortness of breath/dyspnea [ ] Orthopnea [ ] Cough  CV: [ ] Chest pain [ ] Palpitation [ ] Lightheadedness [ ] Syncope  Renal: [ ] Thirst [ ] Edema  GI: [ ] Nausea [ ] Emesis [ ] Abdominal pain [ ] Constipation [ ] Diarrhea  Hem: [ ] Hematemesis [ ] bBright red blood per rectum  ID: [ ] Fever [ ] Chills [ ] Dysuria  ENT: [ ] Rhinorrhea    VITALS: [x] Reviewed    IMAGING/DATA: [x] Reviewed    IVF FLUIDS/MEDICATIONS: [x] Reviewed    ALLERGIES: Allergies    No Known Allergies    Intolerances      DEVICES:   [] Restraints [x] PIVs [] ET tube [] central line [] PICC [] arterial line [] morales [] NGT/OGT [] EVD [] LD [] MYAH/HMV [] LiCOX [] ICP monitor [] Trach [] PEG [] Chest Tube [] other:    EXAMINATION:  General: distress  HEENT: Anicteric sclerae  Cardiac: [id number] tachycardic  Lungs: diminished   Abdomen: Soft, non-tender, +BS  Neurologic: Awake, alert, oriented x3 with choices, follows commands, PERRL, EOMI, face symmetric, bilateral ptosis improving, Neck flexion 2, neck extension 4+. R delt 3/5, biceps 4+/5, Triceps 5/5,  5/5. L delt 3/5, biceps 4/5, triceps 5/5,  5/5. BILATERAL LE 5/5. Sensations intact.   SUMMARY: 65yo Malayalam-speaking woman w/ Myasthenia Gravis (on pyridostigmine, hospitalization every 1-2 years requiring IVIG) (dx 2015), s/p thymectomy (2010), T2DM who presents with SOB, cough, difficulty spitting up sputum x 4 days.  was sick with COVID, so son performed home COVID test for patient which was positive. Patient's primary complaint is SOB when lying down. She has been sleeping upright. She had difficulty coughing up and spitting out sputum but today these symptoms are slightly improved. Also reports difficulty swallowing - but was able to tolerate taking oral pills. Admits to difficulty looking up, but eye drooping is overall improved from past couple of months. No blurry vision. Patient takes pyridostigmine 120mg TID regularly. Patient also has prednisone PRN for sick days-- she has been taking 20mg prednisone x past 4 days as per instruction of her neurologist.     Of note, last hospitalization was 2 years ago at [facility] during which patient was intubated -- son notes that this was likely 2/2 patient being forced to lie down flat in hospital bed, leading to aspiration. She was extubated after ~3 days. Received IVIG during that hospitalization    OVERNIGHT EVENTS: Adm NSCU, upon arrival to the unit, appeared in acute respiratory distress tachypneic, hypertensive, tachycardic, hypoxic, ABG showing resp acidosis, immediately put on BiPAP, started on IVIG with some improvement     2/2- Still on IVIG, PLEX consented. No issues overnight.  2/3- Shiley was placed and was started on PLEX (2/2). No other events.  2/4- To get her second dose of PLEX today. No changes in exam.    ADMISSION SCORES:   GCS: 14 HH: MF: NIHSS: ICH Score:    REVIEW OF SYSTEMS: [] Unable to Assess due to neurologic exam   [ x] All ROS addressed below are non-contributory, except:  Neuro: [ ] Headache [ ] Back pain [ ] Numbness [ ] Weakness [ ] Ataxia [ ] Dizziness [ ] Aphasia [ ] Dysarthria [ ] Visual disturbance  Resp: [ x] Shortness of breath/dyspnea [ ] Orthopnea [ ] Cough  CV: [ ] Chest pain [ ] Palpitation [ ] Lightheadedness [ ] Syncope  Renal: [ ] Thirst [ ] Edema  GI: [ ] Nausea [ ] Emesis [ ] Abdominal pain [ ] Constipation [ ] Diarrhea  Hem: [ ] Hematemesis [ ] bBright red blood per rectum  ID: [ ] Fever [ ] Chills [ ] Dysuria  ENT: [ ] Rhinorrhea    VITALS: [x] Reviewed    IMAGING/DATA: [x] Reviewed    IVF FLUIDS/MEDICATIONS: [x] Reviewed    ALLERGIES: Allergies    No Known Allergies    Intolerances      DEVICES:   [] Restraints [x] PIVs [] ET tube [] central line [] PICC [] arterial line [] morales [] NGT/OGT [] EVD [] LD [] MYAH/HMV [] LiCOX [] ICP monitor [] Trach [] PEG [] Chest Tube [] other:    EXAMINATION:  General: distress  HEENT: Anicteric sclerae  Cardiac: [id number] tachycardic  Lungs: diminished   Abdomen: Soft, non-tender, +BS  Neurologic: Awake, alert, oriented x3 with choices, follows commands, PERRL, EOMI, face symmetric, bilateral ptosis improving, Neck flexion 2, neck extension 4+. R delt 3/5, biceps 4+/5, Triceps 5/5,  5/5. L delt 3/5, biceps 4/5, triceps 5/5,  5/5. BILATERAL LE 5/5. Sensations intact.   SUMMARY: 65yo Malayalam-speaking woman w/ Myasthenia Gravis (on pyridostigmine, hospitalization every 1-2 years requiring IVIG) (dx 2015), s/p thymectomy (2010), T2DM who presents with SOB, cough, difficulty spitting up sputum x 4 days.  was sick with COVID, so son performed home COVID test for patient which was positive. Patient's primary complaint is SOB when lying down. She has been sleeping upright. She had difficulty coughing up and spitting out sputum but today these symptoms are slightly improved. Also reports difficulty swallowing - but was able to tolerate taking oral pills. Admits to difficulty looking up, but eye drooping is overall improved from past couple of months. No blurry vision. Patient takes pyridostigmine 120mg TID regularly. Patient also has prednisone PRN for sick days-- she has been taking 20mg prednisone x past 4 days as per instruction of her neurologist.     Of note, last hospitalization was 2 years ago at [facility] during which patient was intubated -- son notes that this was likely 2/2 patient being forced to lie down flat in hospital bed, leading to aspiration. She was extubated after ~3 days. Received IVIG during that hospitalization    OVERNIGHT EVENTS: Adm NSCU, upon arrival to the unit, appeared in acute respiratory distress tachypneic, hypertensive, tachycardic, hypoxic, ABG showing resp acidosis, immediately put on BiPAP, started on IVIG with some improvement     2/2- Still on IVIG, PLEX consented. No issues overnight.  2/3- Shiley was placed and was started on PLEX (2/2). No other events.  2/4- To get her second dose of PLEX today. No changes in exam.    ADMISSION SCORES:   GCS: 14 HH: MF: NIHSS: ICH Score:    REVIEW OF SYSTEMS: [] Unable to Assess due to neurologic exam   [ x] All ROS addressed below are non-contributory, except:  Neuro: [ ] Headache [ ] Back pain [ ] Numbness [ ] Weakness [ ] Ataxia [ ] Dizziness [ ] Aphasia [ ] Dysarthria [ ] Visual disturbance  Resp: [ x] Shortness of breath/dyspnea [ ] Orthopnea [ ] Cough  CV: [ ] Chest pain [ ] Palpitation [ ] Lightheadedness [ ] Syncope  Renal: [ ] Thirst [ ] Edema  GI: [ ] Nausea [ ] Emesis [ ] Abdominal pain [ ] Constipation [ ] Diarrhea  Hem: [ ] Hematemesis [ ] bBright red blood per rectum  ID: [ ] Fever [ ] Chills [ ] Dysuria  ENT: [ ] Rhinorrhea    VITALS: [x] Reviewed    IMAGING/DATA: [x] Reviewed    IVF FLUIDS/MEDICATIONS: [x] Reviewed    ALLERGIES: Allergies    No Known Allergies    Intolerances      DEVICES:   [] Restraints [x] PIVs [] ET tube [] central line [] PICC [] arterial line [] morales [] NGT/OGT [] EVD [] LD [] MYAH/HMV [] LiCOX [] ICP monitor [] Trach [] PEG [] Chest Tube [] other:    EXAMINATION:  General: distress  HEENT: Anicteric sclerae  Cardiac: [id number] tachycardic  Lungs: diminished   Abdomen: Soft, non-tender, +BS  Neurologic: Awake, alert, oriented x3 with choices, follows commands, PERRL, EOMI, face symmetric, bilateral ptosis improving, Neck flexion 2, neck extension 4+. R delt 3/5, biceps 4+/5, Triceps 5/5,  5/5. L delt 3/5, biceps 4/5, triceps 5/5,  5/5. BILATERAL LE 5/5. Sensations intact.   SUMMARY: 65yo Malayalam-speaking woman w/ Myasthenia Gravis (on pyridostigmine, hospitalization every 1-2 years requiring IVIG) (dx 2015), s/p thymectomy (2010), T2DM who presents with SOB, cough, difficulty spitting up sputum x 4 days.  was sick with COVID, so son performed home COVID test for patient which was positive. Patient's primary complaint is SOB when lying down. She has been sleeping upright. She had difficulty coughing up and spitting out sputum but today these symptoms are slightly improved. Also reports difficulty swallowing - but was able to tolerate taking oral pills. Admits to difficulty looking up, but eye drooping is overall improved from past couple of months. No blurry vision. Patient takes pyridostigmine 120mg TID regularly. Patient also has prednisone PRN for sick days-- she has been taking 20mg prednisone x past 4 days as per instruction of her neurologist.     Of note, last hospitalization was 2 years ago at [facility] during which patient was intubated -- son notes that this was likely 2/2 patient being forced to lie down flat in hospital bed, leading to aspiration. She was extubated after ~3 days. Received IVIG during that hospitalization    OVERNIGHT EVENTS: Adm NSCU, upon arrival to the unit, appeared in acute respiratory distress tachypneic, hypertensive, tachycardic, hypoxic, ABG showing resp acidosis, immediately put on BiPAP, started on IVIG with some improvement     2/2- Still on IVIG, PLEX consented. No issues overnight.  2/3- Shiley was placed and was started on PLEX (2/2). No other events.  2/4- To get her second dose of PLEX today. No changes in exam.    ADMISSION SCORES:   GCS: 14 HH: MF: NIHSS: ICH Score:    REVIEW OF SYSTEMS: [] Unable to Assess due to neurologic exam   [ x] All ROS addressed below are non-contributory, except:  Neuro: [ ] Headache [ ] Back pain [ ] Numbness [ ] Weakness [ ] Ataxia [ ] Dizziness [ ] Aphasia [ ] Dysarthria [ ] Visual disturbance  Resp: [ x] Shortness of breath/dyspnea [ ] Orthopnea [ ] Cough  CV: [ ] Chest pain [ ] Palpitation [ ] Lightheadedness [ ] Syncope  Renal: [ ] Thirst [ ] Edema  GI: [ ] Nausea [ ] Emesis [ ] Abdominal pain [ ] Constipation [ ] Diarrhea  Hem: [ ] Hematemesis [ ] bBright red blood per rectum  ID: [ ] Fever [ ] Chills [ ] Dysuria  ENT: [ ] Rhinorrhea    VITALS: [x] Reviewed    IMAGING/DATA: [x] Reviewed    IVF FLUIDS/MEDICATIONS: [x] Reviewed    ALLERGIES: Allergies    No Known Allergies    Intolerances      DEVICES:   [] Restraints [x] PIVs [] ET tube [] central line [] PICC [] arterial line [] morales [] NGT/OGT [] EVD [] LD [] MYAH/HMV [] LiCOX [] ICP monitor [] Trach [] PEG [] Chest Tube [] other:    EXAMINATION:  General: distress  HEENT: Anicteric sclerae  Cardiac: [id number] tachycardic  Lungs: diminished   Abdomen: Soft, non-tender, +BS  Neurologic: Awake, alert, oriented x3 with choices, follows commands, PERRL, EOMI, face symmetric, bilateral ptosis improving, Neck flexion 2, neck extension 5. R delt 3/5, biceps 5/5, Triceps 4+/5,  5/5. L delt 3/5, biceps 4+/5, triceps 4+/5,  5/5. BILATERAL LE 5/5. Sensations intact.   SUMMARY: 63yo Malayalam-speaking woman w/ Myasthenia Gravis (on pyridostigmine, hospitalization every 1-2 years requiring IVIG) (dx 2015), s/p thymectomy (2010), T2DM who presents with SOB, cough, difficulty spitting up sputum x 4 days.  was sick with COVID, so son performed home COVID test for patient which was positive. Patient's primary complaint is SOB when lying down. She has been sleeping upright. She had difficulty coughing up and spitting out sputum but today these symptoms are slightly improved. Also reports difficulty swallowing - but was able to tolerate taking oral pills. Admits to difficulty looking up, but eye drooping is overall improved from past couple of months. No blurry vision. Patient takes pyridostigmine 120mg TID regularly. Patient also has prednisone PRN for sick days-- she has been taking 20mg prednisone x past 4 days as per instruction of her neurologist.     Of note, last hospitalization was 2 years ago at [facility] during which patient was intubated -- son notes that this was likely 2/2 patient being forced to lie down flat in hospital bed, leading to aspiration. She was extubated after ~3 days. Received IVIG during that hospitalization    OVERNIGHT EVENTS: Adm NSCU, upon arrival to the unit, appeared in acute respiratory distress tachypneic, hypertensive, tachycardic, hypoxic, ABG showing resp acidosis, immediately put on BiPAP, started on IVIG with some improvement     2/2- Still on IVIG, PLEX consented. No issues overnight.  2/3- Shiley was placed and was started on PLEX (2/2). No other events.  2/4- To get her second dose of PLEX today. No changes in exam.    ADMISSION SCORES:   GCS: 14 HH: MF: NIHSS: ICH Score:    REVIEW OF SYSTEMS: [] Unable to Assess due to neurologic exam   [ x] All ROS addressed below are non-contributory, except:  Neuro: [ ] Headache [ ] Back pain [ ] Numbness [ ] Weakness [ ] Ataxia [ ] Dizziness [ ] Aphasia [ ] Dysarthria [ ] Visual disturbance  Resp: [ x] Shortness of breath/dyspnea [ ] Orthopnea [ ] Cough  CV: [ ] Chest pain [ ] Palpitation [ ] Lightheadedness [ ] Syncope  Renal: [ ] Thirst [ ] Edema  GI: [ ] Nausea [ ] Emesis [ ] Abdominal pain [ ] Constipation [ ] Diarrhea  Hem: [ ] Hematemesis [ ] bBright red blood per rectum  ID: [ ] Fever [ ] Chills [ ] Dysuria  ENT: [ ] Rhinorrhea    VITALS: [x] Reviewed    IMAGING/DATA: [x] Reviewed    IVF FLUIDS/MEDICATIONS: [x] Reviewed    ALLERGIES: Allergies    No Known Allergies    Intolerances      DEVICES:   [] Restraints [x] PIVs [] ET tube [] central line [] PICC [] arterial line [] morales [] NGT/OGT [] EVD [] LD [] MYAH/HMV [] LiCOX [] ICP monitor [] Trach [] PEG [] Chest Tube [] other:    EXAMINATION:  General: distress  HEENT: Anicteric sclerae  Cardiac: [id number] tachycardic  Lungs: diminished   Abdomen: Soft, non-tender, +BS  Neurologic: Awake, alert, oriented x3 with choices, follows commands, PERRL, EOMI, face symmetric, bilateral ptosis improving, Neck flexion 2, neck extension 5. R delt 3/5, biceps 5/5, Triceps 4+/5,  5/5. L delt 3/5, biceps 4+/5, triceps 4+/5,  5/5. BILATERAL LE 5/5. Sensations intact.   SUMMARY: 65yo Malayalam-speaking woman w/ Myasthenia Gravis (on pyridostigmine, hospitalization every 1-2 years requiring IVIG) (dx 2015), s/p thymectomy (2010), T2DM who presents with SOB, cough, difficulty spitting up sputum x 4 days.  was sick with COVID, so son performed home COVID test for patient which was positive. Patient's primary complaint is SOB when lying down. She has been sleeping upright. She had difficulty coughing up and spitting out sputum but today these symptoms are slightly improved. Also reports difficulty swallowing - but was able to tolerate taking oral pills. Admits to difficulty looking up, but eye drooping is overall improved from past couple of months. No blurry vision. Patient takes pyridostigmine 120mg TID regularly. Patient also has prednisone PRN for sick days-- she has been taking 20mg prednisone x past 4 days as per instruction of her neurologist.     Of note, last hospitalization was 2 years ago at [facility] during which patient was intubated -- son notes that this was likely 2/2 patient being forced to lie down flat in hospital bed, leading to aspiration. She was extubated after ~3 days. Received IVIG during that hospitalization    OVERNIGHT EVENTS: Adm NSCU, upon arrival to the unit, appeared in acute respiratory distress tachypneic, hypertensive, tachycardic, hypoxic, ABG showing resp acidosis, immediately put on BiPAP, started on IVIG with some improvement     2/2- Still on IVIG, PLEX consented. No issues overnight.  2/3- Shiley was placed and was started on PLEX (2/2). No other events.  2/4- To get her second dose of PLEX today. No changes in exam.    ADMISSION SCORES:   GCS: 14 HH: MF: NIHSS: ICH Score:    REVIEW OF SYSTEMS: [] Unable to Assess due to neurologic exam   [ x] All ROS addressed below are non-contributory, except:  Neuro: [ ] Headache [ ] Back pain [ ] Numbness [ ] Weakness [ ] Ataxia [ ] Dizziness [ ] Aphasia [ ] Dysarthria [ ] Visual disturbance  Resp: [ x] Shortness of breath/dyspnea [ ] Orthopnea [ ] Cough  CV: [ ] Chest pain [ ] Palpitation [ ] Lightheadedness [ ] Syncope  Renal: [ ] Thirst [ ] Edema  GI: [ ] Nausea [ ] Emesis [ ] Abdominal pain [ ] Constipation [ ] Diarrhea  Hem: [ ] Hematemesis [ ] bBright red blood per rectum  ID: [ ] Fever [ ] Chills [ ] Dysuria  ENT: [ ] Rhinorrhea    VITALS: [x] Reviewed    IMAGING/DATA: [x] Reviewed    IVF FLUIDS/MEDICATIONS: [x] Reviewed    ALLERGIES: Allergies    No Known Allergies    Intolerances      DEVICES:   [] Restraints [x] PIVs [] ET tube [] central line [] PICC [] arterial line [] morales [] NGT/OGT [] EVD [] LD [] MYAH/HMV [] LiCOX [] ICP monitor [] Trach [] PEG [] Chest Tube [] other:    EXAMINATION:  General: distress  HEENT: Anicteric sclerae  Cardiac: [id number] tachycardic  Lungs: diminished   Abdomen: Soft, non-tender, +BS  Neurologic: Awake, alert, oriented x3 with choices, follows commands, PERRL, EOMI, face symmetric, bilateral ptosis improving, Neck flexion 2, neck extension 5. R delt 3/5, biceps 5/5, Triceps 4+/5,  5/5. L delt 3/5, biceps 4+/5, triceps 4+/5,  5/5. BILATERAL LE 5/5. Sensations intact.   11.6   8.58  )-----------( 163      ( 10 Feb 2022 05:45 )             35.3     02-10    135  |  100  |  19  ----------------------------<  175<H>  3.9   |  29  |  0.92    Ca    8.4      10 Feb 2022 05:45      CT Head No Cont (01.29.22 @ 09:51)     IMPRESSION:  -Interval development of acute hemorrhage within the left frontal   resection cavity measuring up to 2 cm.    -Interval slight decrease in rightward midline shift, now 1 cm.    -Other postsurgical changes as described above. Stable hypoattenuation   surrounding the resection cavity.      CT Head No Cont (01.28.22 @ 10:03)     IMPRESSION: Decrease in left frontal extra axial air, the rest of the   study appears relatively unchanged when allowing for differences in   technique.    CT Head No Cont (01.27.22 @ 17:11)     IMPRESSION:  No significant change from one hour prior. Postop changes in   the left frontal lobe. Mass effect, midline shift and subfalcine   herniation remain.      CT Head No Cont (01.27.22 @ 15:40)     IMPRESSION:  Left frontal craniotomy with postsurgical changes as described above.   Stable left to right midline shift. Persistent left subfalcine   herniation. Increased mass effect on the third ventricle which is now   effaced.    VA Duplex Lower Ext Vein Scan, Bilat (01.26.22 @ 16:35)     IMPRESSION:  No evidence of deep venous thrombosis in either lower extremity.      CT Head No Cont (01.24.22 @ 19:35)     IMPRESSION:  Extensive lesion and surrounding vasogenic edema in left frontal and   temporal lobes with involvement of the corpus callosum and medial left   frontal lobe with marked ventricular effacement and 1.4 cm left to right   midline shift.

## 2023-02-16 NOTE — HISTORY OF PRESENT ILLNESS
[FreeTextEntry1] : 68 yo RH man with PMHx HTN, DM, glioblastoma (MGMT methylated), presents for follow-up. 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 Graham Bergeron [phone])\par 2/28/2022 - returned to [facility] ED from rehab with complaints of difficulty swallowing and vocal cord injury. notes indicate, "Had NGtube during his stay that removed before he was discharged. Since the removal he has been experiencing throat pain and upper chest pain. Seen by ENT outpt and was noted to have vocal cord swelling and or irritation and sent in for CT. Having difficulty swallowing. 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.\par PATH: granulation, inflammation, no carcinoma.\par 3/26/22 - seen by Endocrine (IVY)\par 3/31/22 - seen by rad onc (CLAYTON) on lexapro 5mg daily.\par 4/4/22 - seen by surgery \par 4/18/22 - 5/30/22 - completed 6-week course of chemoRT\par 7/5/22 - tmz cycle #1, dose 250mg (150mg/m2) \par  8/6/22 - tmz cycle #2, dose 340mg (200mg/m2) \par 8/11/2022 - MRI stable \par 9/6/2022 - Reports Occasional twitching of left ue - hand - Keppra started on 6/13 - episodes reduced in frequency but still happening- daughter showed me a video from 8/19 - appeared to behaving rhythmic movements of right UE. Patient himself is unable to tell me whether he is aware of these events. Daughter reports compliance with medications. Raised Keppra to 750 mg bid\par 9/30/2022- 10/3- TMZ # 3 ( 340 MG x 4 DAYS sec to Thrombocytopenia)\par 10/19/2022 - MRI stable\par 11/25-11/29- Cycle # 5 \par 12/15/2022- MRI had shown increase enhancement (official read) at the posterior inferior margin of the left frontal operative cavity.Plan was to repeat scan at short interval as he was clinically stable. \par 12/23-12/27 He had his 6th cycle of Temodar \par 1/23/2023 - MRI reviewed and discussed at  and plan was made to repeat MRI in February\par 2/16/2023 - Here for a follow up along with a new MRI\par \par He offers no new complaints\par He has no headache focal weakness or change in gait.\par \par

## 2023-02-16 NOTE — PHYSICAL EXAM
[FreeTextEntry1] : He is awake and alert - oriented and fluent (Lao)\par Trach collar in place.\par Follow commands\par EOMI, VFF\par Face symmetric and hearing is intact\par Tongue protrudes midline\par No drift noted\par FFM are equal bilaterally\par STrength full in UE and LE\par Aline and FFM are equal - FNF ok bilaterally\par Ambulating independently and steadily.

## 2023-02-16 NOTE — DISCUSSION/SUMMARY
[FreeTextEntry1] : I have personally reviewed MRI from today and have compared it to 1/19 and 12/15 0 to my review the nodular enhancement seen in the posterior medial aspect of the left frontal post-surgical cavity and the anteromedial enhancement is unchanged from Jan but progressed from 12/15 - would like to see if he is a candidate for IA Avastin at recurrence.

## 2023-02-27 PROBLEM — N40.0 BPH WITHOUT URINARY OBSTRUCTION: Status: ACTIVE | Noted: 2022-05-24

## 2023-02-27 PROBLEM — Z01.818 PREOPERATIVE EXAMINATION: Status: ACTIVE | Noted: 2023-01-01

## 2023-02-27 PROBLEM — C71.9: Status: ACTIVE | Noted: 2022-03-09

## 2023-02-27 NOTE — RESULTS/DATA
[] : results reviewed [de-identified] : PA and lateral\par \par Good effort\par \par Chest wall no abnormality\par \par Pleural space no effusion no reaction\par \par Hilar area normal\par \par Cardiac silhouette normal\par \par Parenchymal abnormality none\par \par Diaphragm normal\par \par Bony structure normal\par \par Impression normal chest x-ray\par Tracheostomy in place [de-identified] : RSR normal

## 2023-02-27 NOTE — ASSESSMENT
[FreeTextEntry4] : No contraindication for surgery. The medical condition is optimized for surgery. There is no evidence neither of angina, CHF, arrhythmias, nor valvular disease. There is no limitation of exercise capacity. JORDAN CHAKRABORTY is to be extubated once fully awake and able to protect airway. he is to be monitored in the recovery room.  They might benefit for high flow oxygen or noninvasive ventilation to prevent or reverse atelectasis.  Avoid oversedation. Patient is high risk for DVT and will require bimodal agents for DVT prophylaxis early mobilization is recommended. Patient is to use the incentive spirometry postoperative.  The patient was given instructions regarding the preoperative preparation. I instructed the patient to notify me if there is any change in the clinical status prior the surgery including any skin manifestations. No aspirin or NSAIDs, Naproxen, LOPEZ inhibitors, herbs, green tea and vitamins prior to surgery. NPO after midnight prior to surg. \par \par \par Benign prostatic hypertrophy\par \par Stable with no evidence of obstruction\par \par Chronic GERD\par \par PPI\par \par Diabetes type 2\par \par I informed the patient to hold his metformin the night before day off and 3 days after the procedure as he is getting a contrast.  He is to continue on the insulin sliding scale.  The hemoglobin A1c is improving.  Follow-up with the fingerstick\par \par Hyperlipidemia continue\par \par Continue statin\par \par Hyponatremia\par \par Follow-up on the sodium\par \par Thrombocytopenia\par \par Follow-up on the platelet level\par \par Tracheal stenosis\par \par The patient had complication postoperatively and he required dilatation multiple times but he has a permanent tracheostomy.  The patient has a size 6 Shiley which is cuffless.  Patient will require changes to a cuffed size 6 intraoperatively for mechanical ventilation\par \par

## 2023-02-27 NOTE — PHYSICAL EXAM
[General Appearance - Alert] : alert [General Appearance - In No Acute Distress] : in no acute distress [Oriented To Time, Place, And Person] : oriented to person, place, and time [Person] : oriented to person [Place] : oriented to place [Time] : oriented to time [Motor Tone] : muscle tone was normal in all four extremities [Motor Strength] : muscle strength was normal in all four extremities [Balance] : balance was intact [Heart Sounds] : normal S1 and S2 [Abnormal Walk] : normal gait

## 2023-02-27 NOTE — REVIEW OF SYSTEMS
[Feeling Poorly] : not feeling poorly [Feeling Tired] : not feeling tired [Anxiety] : no anxiety [Depression] : no depression [Confused or Disoriented] : no confusion [Arm Weakness] : no arm weakness [Leg Weakness] : no leg weakness [Numbness] : no numbness [Tingling] : no tingling [Seizures] : no convulsions [Dizziness] : no dizziness [Cluster Headache] : no cluster headache [Difficulty Walking] : no difficulty walking [Eyesight Problems] : no eyesight problems [Incontinence] : no incontinence [Muscle Weakness] : no muscle weakness [FreeTextEntry6] : tracheostomy capped

## 2023-02-27 NOTE — DATA REVIEWED
[de-identified] : \par   MR Head w/wo IV Cont             Final\par \par No Documents Attached\par \par \par \par \par   EXAM: [id number] - MR BRAIN WAW IC  - ORDERED BY: FABIAN RIVERO\par \par \par PROCEDURE DATE:  02/16/2023\par \par \par \par INTERPRETATION:  INDICATIONS:  Follow-up study\par \par TECHNIQUE:  Multiplanar imaging was performed using T1 weighted, T2 weighted and FLAIR sequences.  Diffusion weighted and susceptibility sensitive images were also obtained.  Following intravenous gadolinium, multiplanar T1 weighted images were performed. 7 cc Gadavist were administered. 0.5 cc were discarded.\par \par COMPARISON EXAMINATION: 1/19/2023\par \par FINDINGS:\par VENTRICLES AND SULCI:  Normal.\par INTRA-AXIAL:  Hemorrhage encephalomalacia and gliosis and some associated enhancement in the left frontal lobe with region of enhancement surrounding the frontal horn of the left lateral ventricle measuring slightly larger compared with the prior. White matter hyperintensity is unchanged compared with the prior in the region surrounding the resection site as well as in the periventricular and subcortical white matter regions. Perfusion in the region is stable in appearance compared with the prior without definitive perfusion abnormality.\par EXTRA-AXIAL:  No mass or collection.\par VISUALIZED SINUSES:  Right maxillary sinus mucosal thickening\par VISUALIZED MASTOIDS:  Clear.\par CALVARIUM:  Left frontal craniotomy\par CAROTID FLOW VOIDS:  Present.\par MISCELLANEOUS:  None.\par \par \par IMPRESSION:\par Question slight increased thickness in association with the periventricular and periresection cavity enhancement when compared with the prior minimally increased nodular enhancement along the posterior medial aspect of the left frontal postsurgical cavity with extension to the subependymal region of the left frontal horn also minimally increased may indicate progressive neoplasm. Unchanged surrounding FLAIR/T2 signal abnormality with extension across the genu of the corpus callosum and involving the right frontal a periventricular white matter consistent with posttreatment related changes and/or nonenhancing infiltrating neoplasm. No definite perfusion abnormality similar in appearance compared with the prior.\par \par \par --- End of Report ---\par \par \par \par \par \par \par MOISES STANLEY MD; Attending Radiologist\par This document has been electronically signed. Feb 21 2023  3:07PM\par \par  \par \par  Ordered by: FABIAN RIVERO       Collected/Examined: [id number] 03:00PM       \par Verification Required       Stage: Final       \par  Performed at: [facility]       Resulted: [id number] 02:55PM       Last Updated: 21Feb2023 03:10PM       Accession: [id number]

## 2023-02-27 NOTE — ASSESSMENT
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 His KPS is 90. I had a long discussion with the patient regarding the role of eligible clinical trials for recurrence (IA Avastin). The patient was extensively educated about the nature of his disease process. The patient should continue to see Dr. Mackenzie and will see Dr. Cox today for clearance. I have explained the alternatives, risks and benefits to the patient and she understands and agrees to proceed.  No, Declined

## 2023-02-27 NOTE — HISTORY OF PRESENT ILLNESS
[No Pertinent Cardiac History] : no history of aortic stenosis, atrial fibrillation, coronary artery disease, recent myocardial infarction, or implantable device/pacemaker [No Pertinent Pulmonary History] : no history of asthma, COPD, sleep apnea, or smoking [No Adverse Anesthesia Reaction] : no adverse anesthesia reaction in self or family member [Diabetes] : diabetes [Good (7-10 METs)] : Good (7-10 METs) [Aortic Stenosis] : no aortic stenosis [Atrial Fibrillation] : no atrial fibrillation [Coronary Artery Disease] : no coronary artery disease [Recent Myocardial Infarction] : no recent myocardial infarction [Implantable Device/Pacemaker] : no implantable device/pacemaker [Asthma] : no asthma [COPD] : no COPD [Sleep Apnea] : no sleep apnea [Smoker] : not a smoker [Chronic Anticoagulation] : no chronic anticoagulation [Chronic Kidney Disease] : no chronic kidney disease [FreeTextEntry3] : Estrada

## 2023-02-27 NOTE — HISTORY OF PRESENT ILLNESS
[de-identified] : 68 yo RH man with PMHx HTN, DM, glioblastoma MGMT methylated, presents for clinical trial screening.\par \par \par 1/7/2022 - presented to [facility] [place] [facility] [place] with headaches and cognitive changes and was diagnosed with brain tumor\par 1/24/2022 - presented to [facility] ED with complaints of confused speech (only states name to all questions asked)\par 1/27/2022 - s/p resection of LEFT frontal enhancing tumor with BAYRON placed (Dr. Gupta)\par \par PATH: Gliosarcoma, WHO grade IV,  IDH wild-type, EGFR non amplified, MGMT promotor methylated\par \par 2/8/2022 - d/c from [facility] to [facility] rehab\par 4/18/22 - 5/30/22 - chemoRT completed (6 weeks) \par 7/5/22 - C #1 TMZ\par 8/6/22 - C #2 TMZ\par 8/11/2022 - MRI stable \par 9/30/2022- 10/3- C #3 TMZ\par 10/19/2022 - MRI stable\par 11/25-11/29- C #5 TMZ\par 12/15/2022- MRI had shown increase enhancement (official read) at the posterior inferior margin of the left frontal operative cavity\par 12/23-12/27- C #6 TMZ \par 2/16/23: MRI \par \par \par \par \par TODAY 2/27/23: Patient presents as a referral from Dr. Mackenzie to discuss recurrent IA avastin. His most recent MRI showed progression with new enhancement from 2/16/23. He walks without difficulty. He speaks both [place]ian and English and denies any speech deficits. He endorses having morning headaches for which he takes Tylenol prn when severe.

## 2023-02-27 NOTE — REASON FOR VISIT
[New Patient Visit] : a new patient visit [Referred By: _________] : Patient was referred by MARTIN [FreeTextEntry1] : clinical trial screening-recurrent IA avastin ([facility])

## 2023-03-07 NOTE — H&P ADULT - NSHPLABSRESULTS_GEN_ALL_CORE
< from: MR Head w/wo IV Cont (02.16.23 @ 15:00) >    IMPRESSION:  Question slight increased thickness in association with the   periventricular and periresection cavity enhancement when compared with   the prior minimally increased nodular enhancement along the posterior   medial aspect of the left frontal postsurgical cavity with extension to   the subependymal region of the left frontal horn also minimally increased   may indicate progressive neoplasm. Unchanged surrounding FLAIR/T2 signal   abnormality with extension across the genu of the corpus callosum and   involving the right frontal a periventricular white matter consistent   with posttreatment related changes and/or nonenhancing infiltrating   neoplasm. No definite perfusion abnormality similar in appearance   compared with the prior.    < end of copied text >

## 2023-03-07 NOTE — H&P ADULT - NSICDXFAMILYHX_GEN_ALL_CORE_FT
FAMILY HISTORY:  Father  Still living? Unknown  FH: diabetes mellitus, Age at diagnosis: Age Unknown  FH: hyperlipidemia, Age at diagnosis: Age Unknown  FH: hypertension, Age at diagnosis: Age Unknown    Mother  Still living? Unknown  FH: diabetes mellitus, Age at diagnosis: Age Unknown  FH: hypertension, Age at diagnosis: Age Unknown

## 2023-03-07 NOTE — H&P ADULT - NSICDXPASTMEDICALHX_GEN_ALL_CORE_FT
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

## 2023-03-07 NOTE — H&P ADULT - HISTORY OF PRESENT ILLNESS
67 y/o right handed male with pmhx of T2DM, HLD, JAGDISH, hyponatremia, tracheal stenosis s/p tracheostomy, and glioblastoma presents for cerebral angiogram with Avastin treatment.    Daughter states in January 2022, patient had onset of confusion, dizziness, and speech difficulty while in [place] and was diagnosed with brain mass. Pt evaluated [place] the [place] at [facility] ED with CT head revealing left frontal brain mass. Pt underwent resection of left frontal enhancing tumor with GLEOLAN placed 1/27/2022. Path showed gliosarcoma, WHO grade IV, IDH wild-type, EGFR non amplified, MGMT promotor methylated. Pt was discharged to rehab and underwent radiation treatment and Temodar chemotherapy (completed 12/22). MRI 12/22 showed recurrence of brain mass and pt presents today referred by Dr. Mackenzie for trial participation cerebral angiogram with Avastin treatment.    Pt complains of dizziness. Denies headache, nausea, vomiting, weakness, numbness, chest pain, dyspnea.

## 2023-03-07 NOTE — PROGRESS NOTE ADULT - SUBJECTIVE AND OBJECTIVE BOX
NEUROSURGERY POST OP NOTE:    POD# 0 S/P angiogram for IA avastin    S:       T(C): 36.4 (03-07-23 @ 22:15), Max: 36.4 (03-07-23 @ 22:15)  HR: 114 (03-07-23 @ 22:00) (88 - 114)  BP: 135/91 (03-07-23 @ 22:00) (96/67 - 135/91)  RR: 30 (03-07-23 @ 22:00) (14 - 34)  SpO2: 100% (03-07-23 @ 22:00) (98% - 100%)      03-07-23 @ 07:01  -  03-07-23 @ 23:23  --------------------------------------------------------  IN: 200 mL / OUT: 850 mL / NET: -650 mL        acetaminophen     Tablet .. 650 milliGRAM(s) Oral every 6 hours PRN  atorvastatin 20 milliGRAM(s) Oral at bedtime  Bevacizumab (AVASTIN) 1065 milliGRAM(s) 1065 milliGRAM(s) IntraArterial once  chlorhexidine 0.12% Liquid 15 milliLiter(s) Oral Mucosa every 12 hours  chlorhexidine 4% Liquid 1 Application(s) Topical once  fluticasone propionate 50 MICROgram(s)/spray Nasal Spray 1 Spray(s) Both Nostrils two times a day  glucagon  Injectable 1 milliGRAM(s) IntraMuscular once  insulin lispro (ADMELOG) corrective regimen sliding scale   SubCutaneous Before meals and at bedtime  levETIRAcetam  IVPB 750 milliGRAM(s) IV Intermittent every 12 hours  Mannitol 20% 250 milliLiter(s) 250 milliLiter(s) IntraArterial once  ondansetron Injectable 4 milliGRAM(s) IV Push every 8 hours PRN  pantoprazole    Tablet 40 milliGRAM(s) Oral before breakfast  polyethylene glycol 3350 17 Gram(s) Oral every 12 hours PRN  senna 2 Tablet(s) Oral at bedtime  sodium chloride 1 Gram(s) Oral two times a day  sodium chloride 0.9%. 1000 milliLiter(s) IV Continuous <Continuous>  sodium chloride 3%. 500 milliLiter(s) IV Continuous <Continuous>      RADIOLOGY:     Exam:    WOUND/DRAINS:    DEVICES:       Assessment: 69 y/o right handed male with pmhx of T2DM, HLD, JAGDISH, hyponatremia, tracheal stenosis s/p tracheostomy, and glioblastoma now s/p cerebral angiogram with Avastin treatment (3/7)      Plan:      Assessment:  Present when checked    []  GCS  E   V  M     Heart Failure: []Acute, [] acute on chronic , []chronic  Heart Failure:  [] Diastolic (HFpEF), [] Systolic (HFrEF), []Combined (HFpEF and HFrEF), [] RHF, [] Pulm HTN, [] Other    [] EUGENIA, [] ATN, [] AIN, [] other  [] CKD1, [] CKD2, [] CKD 3, [] CKD 4, [] CKD 5, []ESRD    Encephalopathy: [] Metabolic, [] Hepatic, [] toxic, [] Neurological, [] Other    Abnormal Nurtitional Status: [] malnurtition (see nutrition note), [ ]underweight: BMI < 19, [] morbid obesity: BMI >40, [] Cachexia    [] Sepsis  [] hypovolemic shock,[] cardiogenic shock, [] hemorrhagic shock, [] neuogenic shock  [] Acute Respiratory Failure  []Cerebral edema, [] Brain compression/ herniation,   [] Functional quadriplegia  [] Acute blood loss anemia       NEUROSURGERY POST OP NOTE:    POD# 0 S/P angiogram for IA avastin    S: nonverbal      T(C): 36.4 (03-07-23 @ 22:15), Max: 36.4 (03-07-23 @ 22:15)  HR: 114 (03-07-23 @ 22:00) (88 - 114)  BP: 135/91 (03-07-23 @ 22:00) (96/67 - 135/91)  RR: 30 (03-07-23 @ 22:00) (14 - 34)  SpO2: 100% (03-07-23 @ 22:00) (98% - 100%)      03-07-23 @ 07:01  -  03-07-23 @ 23:23  --------------------------------------------------------  IN: 200 mL / OUT: 850 mL / NET: -650 mL        acetaminophen     Tablet .. 650 milliGRAM(s) Oral every 6 hours PRN  atorvastatin 20 milliGRAM(s) Oral at bedtime  Bevacizumab (AVASTIN) 1065 milliGRAM(s) 1065 milliGRAM(s) IntraArterial once  chlorhexidine 0.12% Liquid 15 milliLiter(s) Oral Mucosa every 12 hours  chlorhexidine 4% Liquid 1 Application(s) Topical once  fluticasone propionate 50 MICROgram(s)/spray Nasal Spray 1 Spray(s) Both Nostrils two times a day  glucagon  Injectable 1 milliGRAM(s) IntraMuscular once  insulin lispro (ADMELOG) corrective regimen sliding scale   SubCutaneous Before meals and at bedtime  levETIRAcetam  IVPB 750 milliGRAM(s) IV Intermittent every 12 hours  Mannitol 20% 250 milliLiter(s) 250 milliLiter(s) IntraArterial once  ondansetron Injectable 4 milliGRAM(s) IV Push every 8 hours PRN  pantoprazole    Tablet 40 milliGRAM(s) Oral before breakfast  polyethylene glycol 3350 17 Gram(s) Oral every 12 hours PRN  senna 2 Tablet(s) Oral at bedtime  sodium chloride 1 Gram(s) Oral two times a day  sodium chloride 0.9%. 1000 milliLiter(s) IV Continuous <Continuous>  sodium chloride 3%. 500 milliLiter(s) IV Continuous <Continuous>      RADIOLOGY:     Exam:  GEN: laying in bed, NAD  NEURO: aox0. follows some commands, OE spont, globally aphasic, face symmetric. PERRL, EOMI. LUE/LLE 5/5. RUE w/d, RLE minimal spontaneous movement, will w/d to noxious  CV: RRR +S1/S2  PULM: CTAB  GI: Abd soft, NT/ND  EXT: ext warm, dry, nontender. L DP/PT 2+, R DP/PT dopplerable     WOUND/DRAINS: R groin site c/d/i, safeguard deflated    DEVICES:       Assessment: 69 y/o right handed male with pmhx of T2DM, HLD, JAGDISH, hyponatremia, tracheal stenosis s/p tracheostomy, and glioblastoma now s/p cerebral angiogram with Avastin treatment (3/7)      Plan:  Neuro   - Vitals/neuro q1   - post op CTH negative  - f/u repeat CTH, CTA, CTP read  - pain control   - cont home keppra 750 bid    Cards  - SBP<150    Pulm  - full vent support 40/450/12/8    GI  - NPO  - bowel regimen  - cont home protonix    RENAL:   - 3%@50, NS@200cc/hr  - hyponatremia  - voiding  - trend lactate    HEME:   - trend h/h, PLTs  - SCDs  - LE dopplers pending    ID:   - afebrile    ENDO:   - DKA, on insulin gtt, NS@200cc/hr    DISPO: ICU status, full code, dispo pending      Assessment:  Present when checked    []  GCS  E   V  M     Heart Failure: []Acute, [] acute on chronic , []chronic  Heart Failure:  [] Diastolic (HFpEF), [] Systolic (HFrEF), []Combined (HFpEF and HFrEF), [] RHF, [] Pulm HTN, [] Other    [] EUGENIA, [] ATN, [] AIN, [] other  [] CKD1, [] CKD2, [] CKD 3, [] CKD 4, [] CKD 5, []ESRD    Encephalopathy: [] Metabolic, [] Hepatic, [] toxic, [] Neurological, [] Other    Abnormal Nurtitional Status: [] malnurtition (see nutrition note), [ ]underweight: BMI < 19, [] morbid obesity: BMI >40, [] Cachexia    [] Sepsis  [] hypovolemic shock,[] cardiogenic shock, [] hemorrhagic shock, [] neuogenic shock  [] Acute Respiratory Failure  []Cerebral edema, [] Brain compression/ herniation,   [] Functional quadriplegia  [] Acute blood loss anemia

## 2023-03-07 NOTE — H&P ADULT - ASSESSMENT
67 y/o right handed male with pmhx of T2DM, HLD, JAGDISH, hyponatremia, tracheal stenosis s/p tracheostomy, and glioblastoma presents for cerebral angiogram with Avastin treatment.    Plan:  - consent for cerebral angiogram and Avastin treatment obtained and put in chart. All risks, benefits, and alternatives were explained to the patient, who expressed understanding.  - return to cath lab recovery post procedure 67 y/o right handed male with pmhx of T2DM, HLD, JAGDISH, hyponatremia, tracheal stenosis s/p tracheostomy, and glioblastoma presents for cerebral angiogram with Avastin treatment.    Plan:  - Consent for cerebral angiogram and Avastin treatment obtained and in chart, all risks, benefits, and alternatives discussed including risk of bleeding at access site, infection, stroke, vessel dissection.  - Return to cath lab recovery post procedure

## 2023-03-07 NOTE — PROGRESS NOTE ADULT - ASSESSMENT
Assessment: 68M HTN, DM, chronic hyponatremia, POD 0 for IA Avastin for GBM c/b hypertensive emergency resulting in flash pulmonary edema 2/2 anesthesia iatrogenic administration of neosynephrine     NEURO:  POD 0 for IA Avastin for GBM   -neuro check q1- patient not back to baseline; currently w/ significant neurological deficit, will obtain CTA head & neck w/ perfusion  -seizure disorder c/w keppra 750mg BID  -pain management w/ tylenol   -PT/OT evaluation   Obtain MRI brain w/ w/o contrast on this admission    PULMONARY:  trach to vent   -CPAP wean to 5/5 obtain ABG  -continue to monitor on pulse o2     CARDIOVASCULAR:  hypertensive emergency resulting in flash pulmonary edema now well controlled  monitor on telemetry; sinus tach   vitals q1  sbp goal 100-160      GASTROENTEROLOGY:  NPO   zofran for nausea   GI ppx : Protonix 40mg qd  Daily stool count, LBM prior to arrival    RENAL/:  hyponatemia low serum osm &  urine osm   3% @ 50cc/ hr check BMP q6 hrs goal 132 in q4hrs.   -check BMP qd  -strict i/o's ;   -Na goal 135-145    ENDOCRINE:  Diabetes Mellitus  A1c- pending  Monitor FSG q6 hrs while NPO   Lantus 8U w/ HISS     HEME/ONC:  thrombocytopenia & anemia- likely in setting of chronic disease & chemo  DVT ppx: will hold chemical dvt ppx in setting of recent angio  b/l SCDs  Obtain b/l LE screening dopplers    INFECTIOUS:   Monitor for fevers   post operative antibiotic w/ ancef     Patient is critically ill, requiring critical care services.     I have personally and independently provided [60 ] minutes of critical care services.  This excludes any time spent on separate procedures or teaching.

## 2023-03-07 NOTE — BRIEF OPERATIVE NOTE - OPERATION/FINDINGS
Patient was brought to neuro angiography suite, placed on standard anesthesia monitors and sedated by anesthesiologist.  Under general trach anesthesia a left ICA angiogram was performed.  IA Mannitol and Avastin to left A1 and left M2.   Full report to follow.  Right groin/vasc access site closed with exoseal and manual compression, hemostasis achieved, no hematoma.

## 2023-03-07 NOTE — BRIEF OPERATIVE NOTE - NSICDXBRIEFPROCEDURE_GEN_ALL_CORE_FT
PROCEDURES:  Angiogram, carotid and cerebral arteries 07-Mar-2023 16:18:55 Intra arterial mannitol and avastin injection for GBM Kvng High

## 2023-03-07 NOTE — H&P ADULT - NSHPPHYSICALEXAM_GEN_ALL_CORE
Constitutional: awake. alert, no acute distress  Neurological: A/O x 3, speech clear, strength 5/5 in all extremities throughout, sensation intact throughout, CN II-XII grossly intact, no pronator drift  Neck: supple, nontender  Eyes: PERRL, EOMI  Cardio: regular rate and rhythm  Pulm: nonlabored breathing, normal chest rise  Extremities: well-perfused, no edema  Vascular: 2+ DP, PT, radial pulses bilaterally  Skin: warm, dry

## 2023-03-07 NOTE — CHART NOTE - NSCHARTNOTEFT_GEN_A_CORE
Subjective: Anesthesia related issues surrounding medications, patient's SBP up to 190's, patient developed flash pulmonary edema, managed with diuretics and higher Peep. Patient stable from hemodynamic stand point.  A non contrast head CT was performed to evaluate tumor bed and r/o hemorrhage. The scan is negative for acute heme.  Patient was upgraded to ICU as per anesthesiologist for closer monitoring.    T(C): 36.2 (03-07-23 @ 17:40), Max: 36.2 (03-07-23 @ 17:40)  HR: 88 (03-07-23 @ 17:55) (88 - 90)  BP: 105/73 (03-07-23 @ 17:55) (96/67 - 105/73)  RR: 23 (03-07-23 @ 17:55) (16 - 23)  SpO2: 100% (03-07-23 @ 17:55) (100% - 100%)  Wt(kg): --    Exam: Slowly waking up and intermittently following commands, moves left spontaneously and fully. Moves right side, but slightly weaker.      Wound: right groin dressing C/D/I, no hematoma    Imaging: non contrast head CT pending results    Assessment/Plan: 68 M with multiple medical problems, left frontal GBM, s/p resection, recurrence, now s/p IA avastin.   Anesthesia related complication: ICU admission, chest x-ray, neuro checks, hyponatremia work up, ABG.      Above d/w Dr. Ho

## 2023-03-07 NOTE — PROGRESS NOTE ADULT - SUBJECTIVE AND OBJECTIVE BOX
INTERVAL HISTORY: HPI:  67 y/o right handed male with pmhx of T2DM, HLD, JAGDISH, hyponatremia, tracheal stenosis s/p tracheostomy, and glioblastoma presents for cerebral angiogram with Avastin treatment.    Daughter states in 2022, patient had onset of confusion, dizziness, and speech difficulty while in [place] and was diagnosed with brain mass. Pt evaluated upon return to the [place] at [facility] ED with CT head revealing left frontal brain mass. Pt underwent resection of left frontal enhancing tumor with GLEOLAN placed 2022. Path showed gliosarcoma, WHO grade IV, IDH wild-type, EGFR non amplified, MGMT promotor methylated. Pt was discharged to rehab and underwent radiation treatment and Temodar chemotherapy (completed ). MRI  showed recurrence of brain mass and pt presents today referred by Dr. Mackenzie for trial participation cerebral angiogram with Avastin treatment.    Pt complains of dizziness. Denies headache, nausea, vomiting, weakness, numbness, chest pain, dyspnea.   (07 Mar 2023 12:30)      MEDICATIONS  (STANDING):  atorvastatin 20 milliGRAM(s) Oral at bedtime  Bevacizumab (AVASTIN) 1065 milliGRAM(s) 1065 milliGRAM(s) IntraArterial once  chlorhexidine 0.12% Liquid 15 milliLiter(s) Oral Mucosa every 12 hours  chlorhexidine 4% Liquid 1 Application(s) Topical once  fluticasone propionate 50 MICROgram(s)/spray Nasal Spray 1 Spray(s) Both Nostrils two times a day  glucagon  Injectable 1 milliGRAM(s) IntraMuscular once  insulin lispro (ADMELOG) corrective regimen sliding scale   SubCutaneous Before meals and at bedtime  levETIRAcetam  IVPB 750 milliGRAM(s) IV Intermittent every 12 hours  Mannitol 20% 250 milliLiter(s) 250 milliLiter(s) IntraArterial once  pantoprazole    Tablet 40 milliGRAM(s) Oral before breakfast  senna 2 Tablet(s) Oral at bedtime  sodium chloride 1 Gram(s) Oral two times a day  sodium chloride 0.9%. 1000 milliLiter(s) (75 mL/Hr) IV Continuous <Continuous>  sodium chloride 3%. 500 milliLiter(s) (50 mL/Hr) IV Continuous <Continuous>    MEDICATIONS  (PRN):  acetaminophen     Tablet .. 650 milliGRAM(s) Oral every 6 hours PRN Mild Pain (1 - 3)  ondansetron Injectable 4 milliGRAM(s) IV Push every 8 hours PRN Nausea and/or Vomiting  polyethylene glycol 3350 17 Gram(s) Oral every 12 hours PRN Constipation      Drug Dosing Weight  Height (cm): 170.2 (07 Mar 2023 16:49)  Weight (kg): 71.214 (07 Mar 2023 16:49)  BMI (kg/m2): 24.6 (07 Mar 2023 16:49)  BSA (m2): 1.82 (07 Mar 2023 16:49)    PAST MEDICAL & SURGICAL HISTORY:  Hypertension  Glioblastoma  Grade 4 Gliosarcoma dx 2022  Type 2 diabetes mellitus  Hyperlipidemia  Iron deficiency anemia  Nephrolithiasis  Thrombocytopenia  Hyponatremia  BPH (benign prostatic hyperplasia)  S/P craniotomy  H/O tracheostomy    REVIEW OF SYSTEMS: [x] Unable to Assess due to neurologic exam   [ ] All ROS addressed below are non-contributory, except:  Neuro: [ ] Headache [ ] Back pain [ ] Numbness [ ] Weakness [ ] Ataxia [ ] Dizziness [ ] Aphasia [ ] Dysarthria [ ] Visual disturbance  Resp: [ ] Shortness of breath/dyspnea, [ ] Orthopnea [ ] Cough  CV: [ ] Chest pain [ ] Palpitation [ ] Lightheadedness [ ] Syncope  Renal: [ ] Thirst [ ] Edema  GI: [ ] Nausea [ ] Emesis [ ] Abdominal pain [ ] Constipation [ ] Diarrhea  Hem: [ ] Hematemesis [ ] bright red blood per rectum  ID: [ ] Fever [ ] Chills [ ] Dysuria  ENT: [ ] Rhinorrhea    PHYSICAL EXAM:    General: No Acute Distress,  Neurological: grimace to pain, eyes open spontaneously, tracks, pupils 3mm reactive, non-verbal, not following commands, LUE AG purposeful, LLE spontaneous purposeful, RUE WD, RLE WD  Pulmonary: +trach  Cardiovascular: S1, S2, tachycardiac   Gastrointestinal: Soft, Nontender, Nondistended   Extremities: No calf tenderness   Incision: CDI no hematoma, DP +w/ doppler     ICU Vital Signs Last 24 Hrs  T(C): 36.4 (07 Mar 2023 22:15), Max: 36.4 (07 Mar 2023 22:15)  T(F): 97.5 (07 Mar 2023 22:15), Max: 97.5 (07 Mar 2023 22:15)  HR: 114 (07 Mar 2023 22:00) (88 - 114)  BP: 135/91 (07 Mar 2023 22:00) (96/67 - 135/91)  BP(mean): 108 (07 Mar 2023 22:00) (77 - 108)  ABP: 145/78 (07 Mar 2023 22:00) (106/63 - 145/78)  ABP(mean): 101 (07 Mar 2023 22:00) (77 - 102)  RR: 30 (07 Mar 2023 22:00) (14 - 34)  SpO2: 100% (07 Mar 2023 22:00) (98% - 100%)    O2 Parameters below as of 07 Mar 2023 22:00  Patient On (Oxygen Delivery Method): ventilator,cpap    O2 Concentration (%): 40      ABG - ( 07 Mar 2023 17:56 )  pH, Arterial: 7.41  pH, Blood: x     /  pCO2: 35    /  pO2: 202   / HCO3: 22    / Base Excess: -1.9  /  SaO2: 99.8        I&O's Detail    07 Mar 2023 07:01  -  07 Mar 2023 23:21  --------------------------------------------------------  IN:    IV PiggyBack: 50 mL    sodium chloride 3%: 150 mL  Total IN: 200 mL    OUT:    Incontinent per Condom Catheter (mL): 850 mL  Total OUT: 850 mL    Total NET: -650 mL        Mode: CPAP with PS  FiO2: 40  PEEP: 8  PS: 10  MAP: 12  PIP: 19        LABS:  CBC Full  -  ( 07 Mar 2023 18:00 )  WBC Count : 6.32 K/uL  RBC Count : 3.61 M/uL  Hemoglobin : 11.7 g/dL  Hematocrit : 33.6 %  Platelet Count - Automated : 104 K/uL  Mean Cell Volume : 93.1 fl  Mean Cell Hemoglobin : 32.4 pg  Mean Cell Hemoglobin Concentration : 34.8 gm/dL  Auto Neutrophil # : x  Auto Lymphocyte # : x  Auto Monocyte # : x  Auto Eosinophil # : x  Auto Basophil # : x  Auto Neutrophil % : x  Auto Lymphocyte % : x  Auto Monocyte % : x  Auto Eosinophil % : x  Auto Basophil % : x    03-07    122<L>  |  87<L>  |  9   ----------------------------<  204<H>  5.0   |  21<L>  |  0.81    Ca    8.8      07 Mar 2023 18:00  Phos  5.2     03-07  Mg     1.4     03-07      PT/INR - ( 07 Mar 2023 18:00 )   PT: 13.9 sec;   INR: 1.17          PTT - ( 07 Mar 2023 18:00 )  PTT:27.5 sec  Urinalysis Basic - ( 07 Mar 2023 18:06 )    Color: x / Appearance: x / S.010 / pH: x  Gluc: x / Ketone: x  / Bili: x / Urobili: x   Blood: x / Protein: x / Nitrite: x   Leuk Esterase: x / RBC: x / WBC x   Sq Epi: x / Non Sq Epi: x / Bacteria: x        RADIOLOGY & ADDITIONAL STUDIES:

## 2023-03-08 NOTE — PATIENT PROFILE ADULT - FALL HARM RISK - HARM RISK INTERVENTIONS

## 2023-03-08 NOTE — CONSULT NOTE ADULT - ATTENDING COMMENTS
Initial attending contact date  3/8/23    . See fellow note written above for details. I reviewed the fellow documentation. I have personally seen and examined this patient. I reviewed vitals, labs, medications, cardiac studies, and additional imaging. I agree with the above fellow's findings and plans as written above with the following additions/statements.    67 y/o right handed M with PMH of T2DM, HLD, JAGDISH, hyponatremia, tracheal stenosis s/p tracheostomy, and glioblastoma s/p resection 1/2022 s/p radiation and chemo, admitted for cerebral angio with avastin tx due to recurrence of brain mass. Post op with HTN emergency leading to pulm edema  -Cardiology consulted for low EF noted on POCUS  -ECHO reviewed: severely depressed LVEF ~ 15-20% with akinetic/apical wall and basal inferior wall hyperkinesis. Mod TR with mild pulm HTN  -EKG post op NSR with new septal Qs and 1mm DAVID  -trop .32 now downtrending, BNP 6000  -Clinical picture along with EKG changes, mild trop elevation and ECHO findings all consistent with takotsubo CM  -Since takotsubo CM is diagnosis of exclusion , will need to r/o CAD if EF does not improve once clinical status improves  -  out 1.2 L with lasix 20 mg IV x 1 @ 3am. CXR still with bl effusions, would give additional lasix 40 mg IV x 1  -lactate improving, pt remains warm on exam  -Wean off phenyl, if unable to wean due to hypotension would change to levo
Pt seen on rounds this afternoon.  68-yo man with type 2 DM and tracheal stenosis (with permanent tracheostomy), who underwent resection of a left frontal lobe glioblastoma in January of last year, followed by radiation and chemo.  Although he completed therapy last December, he was noted to have recurrent tumor shortly thereafter, and yesterday underwent cerebral angio with Avastin treatment.  Developed aphasia and R-sided weakness after the procedure, as well as flash pulmonary edema.  Was started on an insulin drip for hyperglycemia in the 300 range accompanied by significant  lactic acidosis and mild ketosis   Drip was stopped at 9 AM, and he was bridged with 10 units NPH at noon.  Last FS was 176 at approx 4 PM  Is still on a bicarb drip  Diabetic history is very incomplete, but it appears that he became transiently insulin-requiring early in 2022 while on steroids, then on metformin only.  Is intubated and sedated.  Lungs with faint bibasilar crackles.  Heart--RR without murmur.  Abdomen soft, non-distended  Would give 18 units Lantus tonight, continue sliding scale coverage q6h with moderate dose scale.  (Will convert the q6h coverage to a standing dose of regular insulin when/if feeds started)
have reviewed status and course to date, including status after OR, in depth with staff, and have spoken with family.    on current rx pt's  metabolic abnormalities are improving.   agree with findings and recommendations.

## 2023-03-08 NOTE — DIETITIAN INITIAL EVALUATION ADULT - OTHER CALCULATIONS
IBW used for calculations as pt >100% of IBW (106%). Needs adjusted for critical care requiring vent support, advanced age. Fluid per team.

## 2023-03-08 NOTE — PROVIDER CONTACT NOTE (CRITICAL VALUE NOTIFICATION) - ACTION/TREATMENT ORDERED:
No interventions at present
Primary RN and neuro team made aware. will continue to monitor
No interventions at present
primary RN and neuro team made aware. Will start bicarb gtt.
Primary RN and neuro team made aware. Will continue to monitor

## 2023-03-08 NOTE — CONSULT NOTE ADULT - PROBLEM SELECTOR RECOMMENDATION 9
the etiology of the respiratory failure is related to cardiogenic shock related to stress cardiomyopathy from norepinephrine.  Peak pressure is stable.  Wean FiO2 down.  CXR is consistent with pulmonary edema.  According to the echo finding the picture is more consistent with pulmonary edema rather ARDS.  Follow on SvO2 and may need ionotrops.

## 2023-03-08 NOTE — PROGRESS NOTE ADULT - SUBJECTIVE AND OBJECTIVE BOX
HPI:  67 y/o right handed male with pmhx of T2DM, HLD, JAGDISH, hyponatremia, tracheal stenosis s/p tracheostomy, and glioblastoma presents for cerebral angiogram with Avastin treatment.    Daughter states in 2022, patient had onset of confusion, dizziness, and speech difficulty while in [place] and was diagnosed with brain mass. Pt evaluated upon return to the [place] at [facility] ED with CT head revealing left frontal brain mass. Pt underwent resection of left frontal enhancing tumor with GLEOLAN placed 2022. Path showed gliosarcoma, WHO grade IV, IDH wild-type, EGFR non amplified, MGMT promotor methylated. Pt was discharged to rehab and underwent radiation treatment and Temodar chemotherapy (completed ). MRI  showed recurrence of brain mass and pt presents today referred by Dr. Mackenzie for trial participation cerebral angiogram with Avastin treatment.    Pt complains of dizziness. Denies headache, nausea, vomiting, weakness, numbness, chest pain, dyspnea.   (07 Mar 2023 12:30)    OVERNIGHT EVENTS: new global aphasia, right side w/d. CTH/CTA/CTP performed. ABG o/n with metabolic acidosis, lactate 7.9 and hyperglycemic 320s. started on insulin gtt, 1L NS bolus and NS@200cc/hr. desatting on cpap, placed on full vent support.    Hospital Course:   3/7: POD0 cerebral angiogram IA avastin. post op in cath lab hypertensive, +flash pulmonary edema w/ desaturation, given diuretics. on cpap   3/8: POD1. o/n new global aphasia, right side w/d. CTH/CTA/CTP performed. ABG o/n with metabolic acidosis, lactate 7.9 and hyperglycemic 320s. started on insulin gtt, 1L NS bolus and NS@200cc/hr. desatting on cpap, placed on full vent support.    Vital Signs Last 24 Hrs  T(C): 37.6 (08 Mar 2023 00:59), Max: 37.6 (08 Mar 2023 00:59)  T(F): 99.7 (08 Mar 2023 00:59), Max: 99.7 (08 Mar 2023 00:59)  HR: 141 (08 Mar 2023 01:00) (88 - 141)  BP: 149/106 (08 Mar 2023 01:00) (96/67 - 150/104)  BP(mean): 122 (08 Mar 2023 01:00) (77 - 122)  RR: 29 (08 Mar 2023 01:00) (14 - 34)  SpO2: 96% (08 Mar 2023 01:00) (96% - 100%)    Parameters below as of 08 Mar 2023 01:00  Patient On (Oxygen Delivery Method): ventilator    O2 Concentration (%): 40    I&O's Summary    07 Mar 2023 07:01  -  08 Mar 2023 01:48  --------------------------------------------------------  IN: 1650 mL / OUT: 850 mL / NET: 800 mL        PHYSICAL EXAM:  GEN: laying in bed, NAD  NEURO: aox0. follows some commands, OE spont, globally aphasic, face symmetric. PERRL, EOMI. LUE/LLE 5/5. RUE w/d, RLE minimal spontaneous movement, will w/d to noxious  CV: RRR +S1/S2  PULM: CTAB  GI: Abd soft, NT/ND  EXT: ext warm, dry, nontender. L DP/PT 2+, R DP/PT dopplerable     WOUND/DRAINS: R groin site c/d/i, safeguard deflated    TUBES/LINES:  [] Campuzano  [] Lumbar Drain  [] Wound Drains  [] Others      DIET:  [x] NPO  [] Mechanical  [] Tube feeds    LABS:                        11.7   6.32  )-----------( 104      ( 07 Mar 2023 18:00 )             33.6     03-08    122<L>  |  89<L>  |  10  ----------------------------<  323<H>  4.5   |  13<L>  |  0.70    Ca    9.0      08 Mar 2023 00:10  Phos  5.2     03-07  Mg     1.8     03-08      PT/INR - ( 07 Mar 2023 18:00 )   PT: 13.9 sec;   INR: 1.17          PTT - ( 07 Mar 2023 18:00 )  PTT:27.5 sec  Urinalysis Basic - ( 07 Mar 2023 18:06 )    Color: x / Appearance: x / S.010 / pH: x  Gluc: x / Ketone: x  / Bili: x / Urobili: x   Blood: x / Protein: x / Nitrite: x   Leuk Esterase: x / RBC: x / WBC x   Sq Epi: x / Non Sq Epi: x / Bacteria: x          CAPILLARY BLOOD GLUCOSE      POCT Blood Glucose.: 313 mg/dL (08 Mar 2023 01:11)  POCT Blood Glucose.: 248 mg/dL (07 Mar 2023 21:12)  POCT Blood Glucose.: 161 mg/dL (07 Mar 2023 17:47)  POCT Blood Glucose.: 100 mg/dL (07 Mar 2023 12:27)  POCT Blood Glucose.: 117 mg/dL (07 Mar 2023 09:25)      Drug Levels: [] N/A    CSF Analysis: [] N/A      Allergies    amoxicillin (Rash)    Intolerances      MEDICATIONS:  Antibiotics:    Neuro:  acetaminophen     Tablet .. 650 milliGRAM(s) Oral every 6 hours PRN  levETIRAcetam  IVPB 750 milliGRAM(s) IV Intermittent every 12 hours  ondansetron Injectable 4 milliGRAM(s) IV Push every 8 hours PRN    Anticoagulation:    OTHER:  atorvastatin 20 milliGRAM(s) Oral at bedtime  Bevacizumab (AVASTIN) 1065 milliGRAM(s) 1065 milliGRAM(s) IntraArterial once  chlorhexidine 0.12% Liquid 15 milliLiter(s) Oral Mucosa every 12 hours  chlorhexidine 4% Liquid 1 Application(s) Topical once  dextrose 50% Injectable 50 milliLiter(s) IV Push every 15 minutes  dextrose 50% Injectable 25 milliLiter(s) IV Push every 15 minutes  fluticasone propionate 50 MICROgram(s)/spray Nasal Spray 1 Spray(s) Both Nostrils two times a day  glucagon  Injectable 1 milliGRAM(s) IntraMuscular once  insulin lispro (ADMELOG) corrective regimen sliding scale   SubCutaneous Before meals and at bedtime  insulin regular Infusion 3 Unit(s)/Hr IV Continuous <Continuous>  Mannitol 20% 250 milliLiter(s) 250 milliLiter(s) IntraArterial once  pantoprazole  Injectable 40 milliGRAM(s) IV Push daily  polyethylene glycol 3350 17 Gram(s) Oral every 12 hours PRN  senna 2 Tablet(s) Oral at bedtime    IVF:  sodium chloride 0.9%. 1000 milliLiter(s) IV Continuous <Continuous>  sodium chloride 3%. 500 milliLiter(s) IV Continuous <Continuous>    CULTURES:    RADIOLOGY & ADDITIONAL TESTS:      ASSESSMENT:  67 y/o right handed male with pmhx of T2DM, HLD, JAGDISH, hyponatremia, tracheal stenosis s/p tracheostomy, and glioblastoma now s/p cerebral angiogram with Avastin treatment (3/7)    C71.9    Abnormal electrocardiogram [ECG] [EKG]    Allergy, unspecified, initial encounter    Anemia, unspecified    Benign prostatic hyperplasia without lower urinary tract symptoms    Calculus of kidney    Cough, unspecified    Depression, unspecified    ENCOUNTER FOR ATTENT    Encounter for general adult medical examination without abnormal findings    Encounter for immunization    Encounter for immunization safety counseling    Encounter for other preprocedural examination    Encounter for screening for malignant neoplasm of prostate    Encounter for screening for other metabolic disorders    Encounter for screening for other viral diseases    ESSENTIAL (PRIMARY)    Gastro-esophageal reflux disease without esophagitis    Generalized anxiety disorder    History of Glioblastoma    Hyperlipidemia, unspecified    Hypo-osmolality and hyponatremia    Hypotension, unspecified    Iron deficiency    Iron deficiency anemia, unspecified    LONG TERM (CURRENT)    LONG TERM (CURRENT)    LONG TERM (CURRENT)    Malignant neoplasm of brain, unspecified    Nausea with vomiting, unspecified    Other constipation    Other fatigue    Other injury of unspecified body region, initial encounter    OTHER SPECIFIED DISE    Personal history of malignant neoplasm, unspecified    Personal history of other diseases of the nervous system and sense organs    Personal history of other endocrine, nutritional and metabolic disease    Personal history of other mental and behavioral disorders    Personal history of transient ischemic attack (TIA), and cerebral infarction without residual deficits    POSTPROCEDURAL SUBGL    Pruritus, unspecified    Shortness of breath    Tachycardia, unspecified    Thrombocytopenia, unspecified    Unspecified abdominal pain    Unspecified visual disturbance    Urinary calculus, unspecified    Vitamin D deficiency, unspecified    No pertinent family history in first degree relatives    FH: diabetes mellitus (Father, Mother)    FH: hyperlipidemia (Father)    FH: hypertension (Father, Mother)    Handoff    No pertinent past medical history    Diabetes mellitus    Hypertension    Glioblastoma    Type 2 diabetes mellitus    Hyperlipidemia    Iron deficiency anemia    Nephrolithiasis    Thrombocytopenia    Hyponatremia    BPH (benign prostatic hyperplasia)    No pertinent past medical history    GBM (glioblastoma multiforme)    GBM (glioblastoma multiforme)    Angiogram, carotid and cerebral arteries    No significant past surgical history    No significant past surgical history    S/P craniotomy    H/O tracheostomy    No significant past surgical history    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        PLAN:  Neuro   - Vitals/neuro q1   - post op CTH negative  - f/u repeat CTH, CTA, CTP read  - pain control   - cont home keppra 750 bid    Cards  - SBP<150    Pulm  - full vent support 40/450/12/8    GI  - NPO  - bowel regimen  - cont home protonix    RENAL:   - 3%@50, NS@200cc/hr  - hyponatremia  - voiding  - trend lactate    HEME:   - trend h/h, PLTs  - SCDs  - LE dopplers pending    ID:   - afebrile    ENDO:   - DKA, on insulin gtt, NS@200cc/hr    DISPO: ICU status, full code, dispo pending        Assessment:  Present when checked    []  GCS  E   V  M     Heart Failure: []Acute, [] acute on chronic , []chronic  Heart Failure:  [] Diastolic (HFpEF), [] Systolic (HFrEF), []Combined (HFpEF and HFrEF), [] RHF, [] Pulm HTN, [] Other    [] EUGENIA, [] ATN, [] AIN, [] other  [] CKD1, [] CKD2, [] CKD 3, [] CKD 4, [] CKD 5, []ESRD    Encephalopathy: [] Metabolic, [] Hepatic, [] toxic, [] Neurological, [] Other    Abnormal Nurtitional Status: [] malnurtition (see nutrition note), [ ]underweight: BMI < 19, [] morbid obesity: BMI >40, [] Cachexia    [] Sepsis  [] hypovolemic shock,[] cardiogenic shock, [] hemorrhagic shock, [] neuogenic shock  [] Acute Respiratory Failure  []Cerebral edema, [] Brain compression/ herniation,   [] Functional quadriplegia  [] Acute blood loss anemia

## 2023-03-08 NOTE — PROGRESS NOTE ADULT - ASSESSMENT
Assessment: 68M HTN, DM, chronic hyponatremia, POD 0 for IA Avastin for GBM c/b hypertensive emergency resulting in flash pulmonary edema 2/2 anesthesia iatrogenic administration of neosynephrine     NEURO:  POD 0 for IA Avastin for GBM   Neuro check q1- patient not back to baseline; currently w/ significant neurological deficit, will obtain CTA head & neck w/ perfusion  Seizure disorder c/w keppra 750mg BID  Pain management w/ tylenol   PT/OT evaluation   Obtain MRI brain w/ w/o contrast on this admission    PULMONARY:  trach to vent   CPAP wean to 5/5 obtain ABG  Continue to monitor on pulse o2     CARDIOVASCULAR:  hypertensive emergency resulting in flash pulmonary edema now well controlled  monitor on telemetry; sinus tach   vitals q1  sbp goal 100-160      GASTROENTEROLOGY:  NPO   zofran for nausea   GI ppx : Protonix 40mg qd  Daily stool count, LBM prior to arrival    RENAL/:  hyponatemia low serum osm &  urine osm   3% @ 50cc/ hr check BMP q6 hrs goal 132 in q4hrs.   -check BMP qd  -strict i/o's ;   -Na goal 135-145    ENDOCRINE:  Diabetes Mellitus  A1c- pending  Monitor FSG q6 hrs while NPO   Lantus 8U w/ HISS     HEME/ONC:  thrombocytopenia & anemia- likely in setting of chronic disease & chemo  DVT ppx: will hold chemical dvt ppx in setting of recent angio  b/l SCDs  Obtain b/l LE screening dopplers    INFECTIOUS:   Monitor for fevers   post operative antibiotic w/ ancef     Patient is critically ill, requiring critical care services.     I have personally and independently provided [60 ] minutes of critical care services.  This excludes any time spent on separate procedures or teaching. Assessment: 69 y/o M HTN, DM, chronic hyponatremia, POD 1 for IA Avastin for GBM c/b hypertensive emergency resulting in flash pulmonary edema 2/2 anesthesia iatrogenic administration of neosynephrine   Course complicated by cardiogenic shock, pulmonary edema, severe lactic acidosis.     NEURO: Encephalopathy ?etiology  POD1 s/p IA Avastin for GBM   Neurochecks Q1   CT/ CTA head & neck w/ perfusion. No LVO or evidence of perfusion deficit  Seizure disorder: Continue Keppra 750mg BID  Encephalopathy: Obtain EEG, ammonia, TSH  Consider neurology consult   Sedation: Propofol. RASS -1 to -3  Obtain MRI brain w/ w/o contrast when stable  Analgesia: Tylenol  Activity:  Bedrest    PULMONARY: Acute pulmonary edema. Likely secondary to cardiogenic shock  Trach to vent   Full vent support (12/450/60/8)  ABG, CXR  POCUS    CARDIOVASCULAR: Cardiogenic shock, hypertensive emergency resulting in flash pulmonary edema  POCUS, TTE, EKG.  Check SVO2, lactate, troponin,   Goal MAP 65  Cardiology consult  Consider inotrope    GASTROENTEROLOGY:  NPO  GI ppx : Protonix 40mg qd  Daily stool count  LBM prior to arrival  LFTs wnl    RENAL/: Profound hyponatemia; low serum osm &  urine osm   S/P 3%. Off. Na 122-> 130   Check BMP, lactate Q6  Strict i/o's ;   Continue NaHCO3 gtt  Na goal 130-135  Nephrology following    ENDOCRINE: Diabetes Mellitus with DKA s/p insulin drip  A1c- 5.8  TSH pending  Cortisol in am    HEME/ONC:  Thrombocytopenia & anemia- likely in setting of chronic disease & chemo  DVT ppx: Start lovenox  B/L SCDs  Obtain b/l LE screening dopplers: pending  Monitor CBC    INFECTIOUS:   Monitor for fevers   Post operative antibiotic w/ ancef     Patient is critically ill, requiring critical care services.     I have personally and independently provided [60 ] minutes of critical care services.  This excludes any time spent on separate procedures or teaching. Assessment: 67 y/o M HTN, DM, chronic hyponatremia, POD 1 for IA Avastin for GBM.   Course complicated by cardiogenic shock/ cardiomyopathy- likely secondary to hypertensive emergency in setting of neosynephrine  Resultant pulmonary edema  Severe lactic acidosis  DKA  Hyponatremia    NEURO: Encephalopathy ?etiology  POD1 s/p IA Avastin for GBM   Neurochecks Q1   CT/ CTA head & neck w/ perfusion. No LVO or evidence of perfusion deficit  Seizure disorder: Continue Keppra 750mg BID  Encephalopathy: Obtain EEG, ammonia, TSH  Consider neurology consult   Sedation: Propofol. RASS -1 to -3  Obtain MRI brain w/ w/o contrast when stable  Analgesia: Tylenol prn. Fentanyl prn.  Activity: Bedrest for now.     PULMONARY: Acute pulmonary edema. Likely secondary to cardiogenic shock  Trach to vent. Full vent support (12/450/60/8)  ABG, CXR  POCUS- see cardiovascular    CARDIOVASCULAR: Cardiogenic shock, hypertensive emergency resulting in flash pulmonary edema  POCUS, TTE, EKG.  Trend SVO2, lactate, troponin, proBNP  Goal MAP >/=65  Cardiology consult  Consider inotrope; d/w cardiology  Continue to monitor and trend hemodynamic parameters: CO, CI, SVR, SVV, SVO2    GASTROENTEROLOGY:  NPO for now  GI ppx: Protonix 40mg qd  Daily stool count  LBM prior to arrival- bowel regimen  LFTs wnl. Monitor     RENAL/: Profound hyponatremia; low serum osm &  urine osm   S/P 3%. Off. Na 122-> 130. Avoid further correction.   Na goal 125-130.   Check BMP, lactate, Mg, phos, ABG, Q6.   Strict Is/Os  Continue NaHCO3 gtt 100-> 75ml/hr  Nephrology following; appreciate recommendations    ENDOCRINE: Diabetes Mellitus with DKA s/p insulin drip  Start long acting insulin, DC gtt. Fingersticks Q1 hr  A1c- 5.8  TSH pending  Check cortisol in am    HEME/ONC:  Thrombocytopenia & anemia- likely in setting of chronic disease & chemo  DVT ppx: Start lovenox 40mg  B/L SCDs  LE screening dopplers: pending  Monitor CBC    INFECTIOUS:   Monitor for signs/symptoms of infection.   No evidence of sepsis/septic shock at this time    MISC:    SOCIAL/FAMILY:  [] awaiting [x] updated at bedside [] family meeting  Contacted patient's daughter Roxanne by phone this am and informed her of patient's critical condition and overnight events.   Presented at the bedside with other siblings    CODE STATUS:  [x] Full Code [] DNR [] DNI [] Palliative/Comfort Care    DISPOSITION:  [x] ICU [] Stroke Unit [] Floor [] EMU [] RCU [] PCU    Time spent: 65 critical care minutes

## 2023-03-08 NOTE — DIETITIAN INITIAL EVALUATION ADULT - EDUCATION DIETARY MODIFICATIONS
Patient's mom called on the phone and states patient has bumps on her bottom and would like to be seen. Please advise   unable to verbalize/demonstrate/(0) unable to meet; needs instruction

## 2023-03-08 NOTE — PROGRESS NOTE ADULT - SUBJECTIVE AND OBJECTIVE BOX
Cardiology Consult Service Progress Note     Toro Flores MD PANDA  Cardiology Fellow   Pager [phone]    Patient is a 68y old  Male who presents with a chief complaint of cerebral angiogram with Avastin (08 Mar 2023 14:11)      SUBJECTIVE/OVERNIGHT EVENTS   No acute events overnight.      OBJECTIVE  Vital Signs Last 24 Hrs  T(C): 37.9 (08 Mar 2023 14:06), Max: 38.2 (08 Mar 2023 05:27)  T(F): 100.2 (08 Mar 2023 14:06), Max: 100.7 (08 Mar 2023 05:27)  HR: 103 (08 Mar 2023 14:00) (88 - 141)  BP: 94/64 (08 Mar 2023 07:00) (94/64 - 152/98)  BP(mean): 76 (08 Mar 2023 07:00) (71 - 122)  RR: 24 (08 Mar 2023 14:00) (14 - 45)  SpO2: 99% (08 Mar 2023 14:00) (84% - 100%)    Parameters below as of 08 Mar 2023 14:00  Patient On (Oxygen Delivery Method): ventilator,AC    O2 Concentration (%): 50    I&O's Summary    07 Mar 2023 07:01  -  08 Mar 2023 07:00  --------------------------------------------------------  IN: 1800.2 mL / OUT: 2095 mL / NET: -294.8 mL    08 Mar 2023 07:01  -  08 Mar 2023 14:24  --------------------------------------------------------  IN: 906.8 mL / OUT: 410 mL / NET: 496.8 mL        PHYSICAL EXAM:  GENERAL: NAD, well-developed  HEAD:  Atraumatic, Normocephalic  EYES: EOMI, conjunctiva and sclera clear  NECK: Supple, No JVD  CHEST/LUNG: Clear to auscultation bilaterally; No wheeze  HEART: Regular rate and rhythm; No murmurs, rubs, or gallops  ABDOMEN: Soft, Nontender, Nondistended; Bowel sounds present  EXTREMITIES:  2+ Peripheral Pulses, No clubbing, cyanosis, or edema  PSYCH: AAOx3  NEUROLOGY: non-focal  SKIN: No rashes or lesions    LABS                        12.9   12.16 )-----------( 133      ( 08 Mar 2023 03:00 )             39.1                         11.7   6.32  )-----------( 104      ( 07 Mar 2023 18:00 )             33.6     03-08    130<L>  |  97  |  13  ----------------------------<  124<H>  4.5   |  21<L>  |  0.90  03-08    125<L>  |  93<L>  |  13  ----------------------------<  285<H>  4.9   |  14<L>  |  0.95    Ca    8.9      08 Mar 2023 07:39  Ca    8.7      08 Mar 2023 04:50  Phos  2.3     03-08  Mg     1.7     03-08    TPro  6.5  /  Alb  3.6  /  TBili  0.7  /  DBili  x   /  AST  27  /  ALT  16  /  AlkPhos  81  03-08    CAPILLARY BLOOD GLUCOSE      POCT Blood Glucose.: 166 mg/dL (08 Mar 2023 11:55)  POCT Blood Glucose.: 130 mg/dL (08 Mar 2023 10:53)  POCT Blood Glucose.: 109 mg/dL (08 Mar 2023 10:04)  POCT Blood Glucose.: 115 mg/dL (08 Mar 2023 09:05)  POCT Blood Glucose.: 129 mg/dL (08 Mar 2023 08:20)  POCT Blood Glucose.: 194 mg/dL (08 Mar 2023 07:04)  POCT Blood Glucose.: 272 mg/dL (08 Mar 2023 06:13)  POCT Blood Glucose.: 295 mg/dL (08 Mar 2023 05:05)  POCT Blood Glucose.: 313 mg/dL (08 Mar 2023 04:00)  POCT Blood Glucose.: 379 mg/dL (08 Mar 2023 03:05)  POCT Blood Glucose.: 305 mg/dL (08 Mar 2023 01:57)  POCT Blood Glucose.: 313 mg/dL (08 Mar 2023 01:11)  POCT Blood Glucose.: 248 mg/dL (07 Mar 2023 21:12)  POCT Blood Glucose.: 161 mg/dL (07 Mar 2023 17:47)    PT/INR - ( 07 Mar 2023 18:00 )   PT: 13.9 sec;   INR: 1.17          PTT - ( 07 Mar 2023 18:00 )  PTT:27.5 sec   08 Mar 2023 08:58 Troponin 0.32 ng/mL /  U/L / CKMB x     / CKMB Units 5.3 ng/mL        ( 08 Mar 2023 09:12 ) pH: 7.45  /  pCO2: 33    /  pO2: 165   / HCO3: 23    / Base Excess: -0.4  /  SaO2: 99.4            ( 08 Mar 2023 06:51 ) pH: 7.38  /  pCO2: 27    /  pO2: 113   / HCO3: 16    / Base Excess: -7.6  /  SaO2: 98.8            ( 08 Mar 2023 05:33 ) pH: 7.34  /  pCO2: 25    /  pO2: 173   / HCO3: 14    / Base Excess: -10.5 /  SaO2: 99.4              23 @ 09:12 - VBG - pH: 7.37  | pCO2: 41    | pO2: 35    | Lactate:            Urinalysis Basic - ( 07 Mar 2023 18:06 )    Color: x / Appearance: x / S.010 / pH: x  Gluc: x / Ketone: x  / Bili: x / Urobili: x   Blood: x / Protein: x / Nitrite: x   Leuk Esterase: x / RBC: x / WBC x   Sq Epi: x / Non Sq Epi: x / Bacteria: x        RADIOLOGY & ADDITIONAL TESTS:    MEDICATIONS  (STANDING):  atorvastatin 20 milliGRAM(s) Oral at bedtime  chlorhexidine 0.12% Liquid 15 milliLiter(s) Oral Mucosa every 12 hours  chlorhexidine 2% Cloths 1 Application(s) Topical <User Schedule>  chlorhexidine 4% Liquid 1 Application(s) Topical once  dextrose 5%. 1000 milliLiter(s) (50 mL/Hr) IV Continuous <Continuous>  dextrose 5%. 1000 milliLiter(s) (100 mL/Hr) IV Continuous <Continuous>  dextrose 50% Injectable 25 Gram(s) IV Push once  dextrose 50% Injectable 12.5 Gram(s) IV Push once  dextrose 50% Injectable 25 Gram(s) IV Push once  enoxaparin Injectable 40 milliGRAM(s) SubCutaneous <User Schedule>  fluticasone propionate 50 MICROgram(s)/spray Nasal Spray 1 Spray(s) Both Nostrils two times a day  glucagon  Injectable 1 milliGRAM(s) IntraMuscular once  insulin regular  human corrective regimen sliding scale   SubCutaneous every 6 hours  levETIRAcetam  IVPB 750 milliGRAM(s) IV Intermittent every 12 hours  pantoprazole  Injectable 40 milliGRAM(s) IV Push daily  phenylephrine    Infusion 1.7 MICROgram(s)/kG/Min (45.4 mL/Hr) IV Continuous <Continuous>  propofol Infusion 9.993 MICROgram(s)/kG/Min (4.27 mL/Hr) IV Continuous <Continuous>  senna 2 Tablet(s) Oral at bedtime  sodium bicarbonate  Infusion 0.158 mEq/kG/Hr (75 mL/Hr) IV Continuous <Continuous>    MEDICATIONS  (PRN):  acetaminophen     Tablet .. 650 milliGRAM(s) Oral every 6 hours PRN Mild Pain (1 - 3)  dextrose Oral Gel 15 Gram(s) Oral once PRN Blood Glucose LESS THAN 70 milliGRAM(s)/deciliter  ondansetron Injectable 4 milliGRAM(s) IV Push every 8 hours PRN Nausea and/or Vomiting  polyethylene glycol 3350 17 Gram(s) Oral every 12 hours PRN Constipation  sodium chloride 0.9% lock flush 10 milliLiter(s) IV Push every 1 hour PRN Pre/post blood products, medications, blood draw, and to maintain line patency

## 2023-03-08 NOTE — CONSULT NOTE ADULT - SUBJECTIVE AND OBJECTIVE BOX
Patient is a 68y old  Male who presents with a chief complaint of cerebral angiogram with Avastin (08 Mar 2023 13:06)      HPI:  67 y/o male with pmhx of T2DM, HLD, JAGDISH, hyponatremia, tracheal stenosis s/p tracheostomy, and glioblastoma presents for cerebral angiogram with Avastin treatment. At [facility] ED, patient found to have CT head revealing left frontal brain mass, ultimatly diagnosed with glioblastoma, WHO grade IV. MRI  showed recurrence of brain mass and pt presents today referred by Dr. Mackenzie for trial participation cerebral angiogram with Avastin treatment. During overnight angiogram, anesthesia with complications resulting in flash pulmonary edema likely 2/2 to cardiogenic shock w/ a lactate up to 12. Patient started on bicarb gtt.     Pt complains of dizziness. Denies headache, nausea, vomiting, weakness, numbness, chest pain, dyspnea.   (07 Mar 2023 12:30)  No NSAID use. Nephrology consulted for elevated creatinine.     PAST MEDICAL & SURGICAL HISTORY:  Hypertension      Glioblastoma  Grade 4 Gliosarcoma dx 2022      Type 2 diabetes mellitus      Hyperlipidemia      Iron deficiency anemia      Nephrolithiasis      Thrombocytopenia      Hyponatremia      BPH (benign prostatic hyperplasia)      S/P craniotomy      H/O tracheostomy            Allergies:  amoxicillin (Rash)      Home Medications:   acetaminophen     Tablet .. 650 milliGRAM(s) Oral every 6 hours PRN  atorvastatin 20 milliGRAM(s) Oral at bedtime  chlorhexidine 0.12% Liquid 15 milliLiter(s) Oral Mucosa every 12 hours  chlorhexidine 2% Cloths 1 Application(s) Topical <User Schedule>  chlorhexidine 4% Liquid 1 Application(s) Topical once  dextrose 5%. 1000 milliLiter(s) IV Continuous <Continuous>  dextrose 5%. 1000 milliLiter(s) IV Continuous <Continuous>  dextrose 50% Injectable 25 Gram(s) IV Push once  dextrose 50% Injectable 12.5 Gram(s) IV Push once  dextrose 50% Injectable 25 Gram(s) IV Push once  dextrose Oral Gel 15 Gram(s) Oral once PRN  enoxaparin Injectable 40 milliGRAM(s) SubCutaneous <User Schedule>  fluticasone propionate 50 MICROgram(s)/spray Nasal Spray 1 Spray(s) Both Nostrils two times a day  glucagon  Injectable 1 milliGRAM(s) IntraMuscular once  insulin regular  human corrective regimen sliding scale   SubCutaneous every 6 hours  levETIRAcetam  IVPB 750 milliGRAM(s) IV Intermittent every 12 hours  ondansetron Injectable 4 milliGRAM(s) IV Push every 8 hours PRN  pantoprazole  Injectable 40 milliGRAM(s) IV Push daily  phenylephrine    Infusion 1.7 MICROgram(s)/kG/Min IV Continuous <Continuous>  polyethylene glycol 3350 17 Gram(s) Oral every 12 hours PRN  propofol Infusion 9.993 MICROgram(s)/kG/Min IV Continuous <Continuous>  senna 2 Tablet(s) Oral at bedtime  sodium bicarbonate  Infusion 0.158 mEq/kG/Hr IV Continuous <Continuous>  sodium chloride 0.9% lock flush 10 milliLiter(s) IV Push every 1 hour PRN      Hospital Medications:   MEDICATIONS  (STANDING):  atorvastatin 20 milliGRAM(s) Oral at bedtime  chlorhexidine 0.12% Liquid 15 milliLiter(s) Oral Mucosa every 12 hours  chlorhexidine 2% Cloths 1 Application(s) Topical <User Schedule>  chlorhexidine 4% Liquid 1 Application(s) Topical once  dextrose 5%. 1000 milliLiter(s) (50 mL/Hr) IV Continuous <Continuous>  dextrose 5%. 1000 milliLiter(s) (100 mL/Hr) IV Continuous <Continuous>  dextrose 50% Injectable 25 Gram(s) IV Push once  dextrose 50% Injectable 12.5 Gram(s) IV Push once  dextrose 50% Injectable 25 Gram(s) IV Push once  enoxaparin Injectable 40 milliGRAM(s) SubCutaneous <User Schedule>  fluticasone propionate 50 MICROgram(s)/spray Nasal Spray 1 Spray(s) Both Nostrils two times a day  glucagon  Injectable 1 milliGRAM(s) IntraMuscular once  insulin regular  human corrective regimen sliding scale   SubCutaneous every 6 hours  levETIRAcetam  IVPB 750 milliGRAM(s) IV Intermittent every 12 hours  pantoprazole  Injectable 40 milliGRAM(s) IV Push daily  phenylephrine    Infusion 1.7 MICROgram(s)/kG/Min (45.4 mL/Hr) IV Continuous <Continuous>  propofol Infusion 9.993 MICROgram(s)/kG/Min (4.27 mL/Hr) IV Continuous <Continuous>  senna 2 Tablet(s) Oral at bedtime  sodium bicarbonate  Infusion 0.158 mEq/kG/Hr (75 mL/Hr) IV Continuous <Continuous>      SOCIAL HISTORY:  Denies ETOh, smoking, or drug use.     Family History:  FAMILY HISTORY:  FH: diabetes mellitus (Father, Mother)    FH: hyperlipidemia (Father)    FH: hypertension (Father, Mother)          VITALS:  T(F): 99.2 (23 @ 09:06), Max: 100.7 (23 @ 05:27)  HR: 103 (23 @ 13:26)  BP: 94/64 (23 @ 07:00)  RR: 22 (23 @ 13:26)  SpO2: 99% (23 @ 13:26)  Wt(kg): --     07:01  -   07:00  --------------------------------------------------------  IN: 1800.2 mL / OUT: 2095 mL / NET: -294.8 mL     07:01  -   @ 13:38  --------------------------------------------------------  IN: 809.8 mL / OUT: 335 mL / NET: 474.9 mL      Height (cm): 170.2 ( @ 16:49)  Weight (kg): 71.214 ( @ 16:49)  BMI (kg/m2): 24.6 ( @ 16:49)  BSA (m2): 1.82 ( @ 16:49)  CAPILLARY BLOOD GLUCOSE      POCT Blood Glucose.: 166 mg/dL (08 Mar 2023 11:55)  POCT Blood Glucose.: 130 mg/dL (08 Mar 2023 10:53)  POCT Blood Glucose.: 109 mg/dL (08 Mar 2023 10:04)  POCT Blood Glucose.: 115 mg/dL (08 Mar 2023 09:05)  POCT Blood Glucose.: 129 mg/dL (08 Mar 2023 08:20)  POCT Blood Glucose.: 194 mg/dL (08 Mar 2023 07:04)  POCT Blood Glucose.: 272 mg/dL (08 Mar 2023 06:13)  POCT Blood Glucose.: 295 mg/dL (08 Mar 2023 05:05)  POCT Blood Glucose.: 313 mg/dL (08 Mar 2023 04:00)  POCT Blood Glucose.: 379 mg/dL (08 Mar 2023 03:05)  POCT Blood Glucose.: 305 mg/dL (08 Mar 2023 01:57)  POCT Blood Glucose.: 313 mg/dL (08 Mar 2023 01:11)  POCT Blood Glucose.: 248 mg/dL (07 Mar 2023 21:12)  POCT Blood Glucose.: 161 mg/dL (07 Mar 2023 17:47)      Review of Systems:  CONSTITUTIONAL: No fever or weight loss   RESPIRATORY: No shortness of breath, cough  CARDIOVASCULAR: No Chest pain, shortness of breath   GASTROINTESTINAL: No abdominal pain, nausea, vomiting, diarrhea  GENITOURINARY: No urinary frequency, gross hematuria, dysuria  NEUROLOGICAL: No headache, weakness  SKIN: No rash or skin lesion  MUSCULOSKELETAL: No swelling or pain   Psych: Denies suicidal or homicidal ideation     PHYSICAL EXAM:  GENERAL: patient sedated    HEENT: JUAN, EOMI, neck supple, no JVP. Trach in place, insertion site clean w/o erythema   CHEST/LUNG: anterior lung sounds w/ bl crackles   HEART: Regular rate and rhythm, no murmur, no gallops, no rub   ABDOMEN: Soft, nontender, non distended  : No flank or supra pubic tenderness.  EXTREMITIES: no pedal edema  Neurology: Sedated, neuro exam deferred    SKIN: No rash or skin lesion     LABS:      130<L>  |  97  |  13  ----------------------------<  124<H>  4.5   |  21<L>  |  0.90    Ca    8.9      08 Mar 2023 07:39  Phos  2.3     03-08  Mg     1.7     03-08    TPro  6.5  /  Alb  3.6  /  TBili  0.7  /  DBili      /  AST  27  /  ALT  16  /  AlkPhos  81  03-08    Creatinine Trend: 0.90 <--, 0.95 <--, 1.03 <--, 0.70 <--, 0.81 <--                        12.9   12.16 )-----------( 133      ( 08 Mar 2023 03:00 )             39.1     Urine Studies:  Urinalysis Basic - ( 07 Mar 2023 18:06 )    Color:  / Appearance:  / S.010 / pH:   Gluc:  / Ketone:   / Bili:  / Urobili:    Blood:  / Protein:  / Nitrite:    Leuk Esterase:  / RBC:  / WBC    Sq Epi:  / Non Sq Epi:  / Bacteria:       Sodium, Random Urine: <20 mmol/L ( @ 10:18)  Osmolality, Random Urine: 568 mosm/kg ( @ 09:14)  Osmolality, Random Urine: 273 mosm/kg ( @ 18:06)  Creatinine, Random Urine: 30 mg/dL ( @ 18:06)  Sodium, Random Urine: 61 mmol/L ( @ 18:06)         Patient is a 68y old  Male who presents with a chief complaint of cerebral angiogram with Avastin (08 Mar 2023 13:06)      HPI:  69 y/o male with pmhx of T2DM, HLD, JAGDISH, hyponatremia, tracheal stenosis s/p tracheostomy, and glioblastoma presents for cerebral angiogram with Avastin treatment. At [facility] ED, patient found to have CT head revealing left frontal brain mass, ultimatly diagnosed with glioblastoma, WHO grade IV. MRI  showed recurrence of brain mass and pt presents today referred by Dr. Mackenzie for trial participation cerebral angiogram with Avastin treatment. During overnight angiogram, anesthesia with complications resulting in flash pulmonary edema likely 2/2 to cardiogenic shock w/ a lactate up to 12. Patient started on bicarb gtt overnight.     Pt complains of dizziness. Denies headache, nausea, vomiting, weakness, numbness, chest pain, dyspnea.   (07 Mar 2023 12:30)  No NSAID use. Nephrology consulted for elevated creatinine.     PAST MEDICAL & SURGICAL HISTORY:  Hypertension      Glioblastoma  Grade 4 Gliosarcoma dx 2022      Type 2 diabetes mellitus      Hyperlipidemia      Iron deficiency anemia      Nephrolithiasis      Thrombocytopenia      Hyponatremia      BPH (benign prostatic hyperplasia)      S/P craniotomy      H/O tracheostomy            Allergies:  amoxicillin (Rash)      Home Medications:   acetaminophen     Tablet .. 650 milliGRAM(s) Oral every 6 hours PRN  atorvastatin 20 milliGRAM(s) Oral at bedtime  chlorhexidine 0.12% Liquid 15 milliLiter(s) Oral Mucosa every 12 hours  chlorhexidine 2% Cloths 1 Application(s) Topical <User Schedule>  chlorhexidine 4% Liquid 1 Application(s) Topical once  dextrose 5%. 1000 milliLiter(s) IV Continuous <Continuous>  dextrose 5%. 1000 milliLiter(s) IV Continuous <Continuous>  dextrose 50% Injectable 25 Gram(s) IV Push once  dextrose 50% Injectable 12.5 Gram(s) IV Push once  dextrose 50% Injectable 25 Gram(s) IV Push once  dextrose Oral Gel 15 Gram(s) Oral once PRN  enoxaparin Injectable 40 milliGRAM(s) SubCutaneous <User Schedule>  fluticasone propionate 50 MICROgram(s)/spray Nasal Spray 1 Spray(s) Both Nostrils two times a day  glucagon  Injectable 1 milliGRAM(s) IntraMuscular once  insulin regular  human corrective regimen sliding scale   SubCutaneous every 6 hours  levETIRAcetam  IVPB 750 milliGRAM(s) IV Intermittent every 12 hours  ondansetron Injectable 4 milliGRAM(s) IV Push every 8 hours PRN  pantoprazole  Injectable 40 milliGRAM(s) IV Push daily  phenylephrine    Infusion 1.7 MICROgram(s)/kG/Min IV Continuous <Continuous>  polyethylene glycol 3350 17 Gram(s) Oral every 12 hours PRN  propofol Infusion 9.993 MICROgram(s)/kG/Min IV Continuous <Continuous>  senna 2 Tablet(s) Oral at bedtime  sodium bicarbonate  Infusion 0.158 mEq/kG/Hr IV Continuous <Continuous>  sodium chloride 0.9% lock flush 10 milliLiter(s) IV Push every 1 hour PRN      Hospital Medications:   MEDICATIONS  (STANDING):  atorvastatin 20 milliGRAM(s) Oral at bedtime  chlorhexidine 0.12% Liquid 15 milliLiter(s) Oral Mucosa every 12 hours  chlorhexidine 2% Cloths 1 Application(s) Topical <User Schedule>  chlorhexidine 4% Liquid 1 Application(s) Topical once  dextrose 5%. 1000 milliLiter(s) (50 mL/Hr) IV Continuous <Continuous>  dextrose 5%. 1000 milliLiter(s) (100 mL/Hr) IV Continuous <Continuous>  dextrose 50% Injectable 25 Gram(s) IV Push once  dextrose 50% Injectable 12.5 Gram(s) IV Push once  dextrose 50% Injectable 25 Gram(s) IV Push once  enoxaparin Injectable 40 milliGRAM(s) SubCutaneous <User Schedule>  fluticasone propionate 50 MICROgram(s)/spray Nasal Spray 1 Spray(s) Both Nostrils two times a day  glucagon  Injectable 1 milliGRAM(s) IntraMuscular once  insulin regular  human corrective regimen sliding scale   SubCutaneous every 6 hours  levETIRAcetam  IVPB 750 milliGRAM(s) IV Intermittent every 12 hours  pantoprazole  Injectable 40 milliGRAM(s) IV Push daily  phenylephrine    Infusion 1.7 MICROgram(s)/kG/Min (45.4 mL/Hr) IV Continuous <Continuous>  propofol Infusion 9.993 MICROgram(s)/kG/Min (4.27 mL/Hr) IV Continuous <Continuous>  senna 2 Tablet(s) Oral at bedtime  sodium bicarbonate  Infusion 0.158 mEq/kG/Hr (75 mL/Hr) IV Continuous <Continuous>      SOCIAL HISTORY:  Denies ETOh, smoking, or drug use.     Family History:  FAMILY HISTORY:  FH: diabetes mellitus (Father, Mother)    FH: hyperlipidemia (Father)    FH: hypertension (Father, Mother)          VITALS:  T(F): 99.2 (23 @ 09:06), Max: 100.7 (23 @ 05:27)  HR: 103 (23 @ 13:26)  BP: 94/64 (23 @ 07:00)  RR: 22 (23 @ 13:26)  SpO2: 99% (23 @ 13:26)  Wt(kg): --     07:01  -   07:00  --------------------------------------------------------  IN: 1800.2 mL / OUT: 2095 mL / NET: -294.8 mL     07:01  -   @ 13:38  --------------------------------------------------------  IN: 809.8 mL / OUT: 335 mL / NET: 474.9 mL      Height (cm): 170.2 ( @ 16:49)  Weight (kg): 71.214 ( @ 16:49)  BMI (kg/m2): 24.6 ( @ 16:49)  BSA (m2): 1.82 ( @ 16:49)  CAPILLARY BLOOD GLUCOSE      POCT Blood Glucose.: 166 mg/dL (08 Mar 2023 11:55)  POCT Blood Glucose.: 130 mg/dL (08 Mar 2023 10:53)  POCT Blood Glucose.: 109 mg/dL (08 Mar 2023 10:04)  POCT Blood Glucose.: 115 mg/dL (08 Mar 2023 09:05)  POCT Blood Glucose.: 129 mg/dL (08 Mar 2023 08:20)  POCT Blood Glucose.: 194 mg/dL (08 Mar 2023 07:04)  POCT Blood Glucose.: 272 mg/dL (08 Mar 2023 06:13)  POCT Blood Glucose.: 295 mg/dL (08 Mar 2023 05:05)  POCT Blood Glucose.: 313 mg/dL (08 Mar 2023 04:00)  POCT Blood Glucose.: 379 mg/dL (08 Mar 2023 03:05)  POCT Blood Glucose.: 305 mg/dL (08 Mar 2023 01:57)  POCT Blood Glucose.: 313 mg/dL (08 Mar 2023 01:11)  POCT Blood Glucose.: 248 mg/dL (07 Mar 2023 21:12)  POCT Blood Glucose.: 161 mg/dL (07 Mar 2023 17:47)      Review of Systems:  CONSTITUTIONAL: No fever or weight loss   RESPIRATORY: No shortness of breath, cough  CARDIOVASCULAR: No Chest pain, shortness of breath   GASTROINTESTINAL: No abdominal pain, nausea, vomiting, diarrhea  GENITOURINARY: No urinary frequency, gross hematuria, dysuria  NEUROLOGICAL: No headache, weakness  SKIN: No rash or skin lesion  MUSCULOSKELETAL: No swelling or pain   Psych: Denies suicidal or homicidal ideation     PHYSICAL EXAM:  GENERAL: patient sedated    HEENT: JUAN, EOMI, neck supple, no JVP. Trach in place, insertion site clean w/o erythema   CHEST/LUNG: anterior lung sounds w/ bl crackles   HEART: Regular rate and rhythm, no murmur, no gallops, no rub   ABDOMEN: Soft, nontender, non distended  : No flank or supra pubic tenderness.  EXTREMITIES: no pedal edema  Neurology: Sedated, neuro exam deferred    SKIN: No rash or skin lesion     LABS:      130<L>  |  97  |  13  ----------------------------<  124<H>  4.5   |  21<L>  |  0.90    Ca    8.9      08 Mar 2023 07:39  Phos  2.3     03-08  Mg     1.7     03-08    TPro  6.5  /  Alb  3.6  /  TBili  0.7  /  DBili      /  AST  27  /  ALT  16  /  AlkPhos  81  03-08    Creatinine Trend: 0.90 <--, 0.95 <--, 1.03 <--, 0.70 <--, 0.81 <--                        12.9   12.16 )-----------( 133      ( 08 Mar 2023 03:00 )             39.1     Urine Studies:  Urinalysis Basic - ( 07 Mar 2023 18:06 )    Color:  / Appearance:  / S.010 / pH:   Gluc:  / Ketone:   / Bili:  / Urobili:    Blood:  / Protein:  / Nitrite:    Leuk Esterase:  / RBC:  / WBC    Sq Epi:  / Non Sq Epi:  / Bacteria:       Sodium, Random Urine: <20 mmol/L ( @ 10:18)  Osmolality, Random Urine: 568 mosm/kg ( @ 09:14)  Osmolality, Random Urine: 273 mosm/kg ( @ 18:06)  Creatinine, Random Urine: 30 mg/dL ( @ 18:06)  Sodium, Random Urine: 61 mmol/L ( @ 18:06)

## 2023-03-08 NOTE — CONSULT NOTE ADULT - TIME BILLING
as noted above
Patient seen and examined with house-staff during bedside rounds.  Resident note read, including vitals, physical findings, laboratory data, and radiological reports.   Revisions included below.  Direct personal management at bed side and extensive interpretation of the data.  Plan was outlined and discussed in details with the housestaff.  Decision making of high complexity  Action taken for acute disease activity to reflect the level of care provided:  - medication reconciliation  - review laboratory data  discussed with NS and cardiology
Transitioning to Lantus-based regimen

## 2023-03-08 NOTE — CONSULT NOTE ADULT - SUBJECTIVE AND OBJECTIVE BOX
Patient is a 68y old  Male who presents with a chief complaint of cerebral angiogram with Avastin (08 Mar 2023 13:35)      HPI:  67 y/o right handed male with pmhx of T2DM, HLD, JAGDISH, hyponatremia, tracheal stenosis s/p tracheostomy, and glioblastoma presents for cerebral angiogram with Avastin treatment.    Daughter states in 2022, patient had onset of confusion, dizziness, and speech difficulty while in [place] and was diagnosed with brain mass. Pt evaluated upon return to the  at [facility] ED with CT head revealing left frontal brain mass. Pt underwent resection of left frontal enhancing tumor with GLEOLAN placed 2022. Path showed gliosarcoma, WHO grade IV, IDH wild-type, EGFR non amplified, MGMT promotor methylated. Pt was discharged to rehab and underwent radiation treatment and Temodar chemotherapy (completed ). MRI  showed recurrence of brain mass and pt presents today referred by Dr. Mackenzie for trial participation cerebral angiogram with Avastin treatment.    Pt complains of dizziness. Denies headache, nausea, vomiting, weakness, numbness, chest pain, dyspnea.   (07 Mar 2023 12:30)      PAST MEDICAL & SURGICAL HISTORY:  Hypertension      Glioblastoma  Grade 4 Gliosarcoma dx 2022      Type 2 diabetes mellitus      Hyperlipidemia      Iron deficiency anemia      Nephrolithiasis      Thrombocytopenia      Hyponatremia      BPH (benign prostatic hyperplasia)      S/P craniotomy      H/O tracheostomy          FAMILY HISTORY:  FH: diabetes mellitus (Father, Mother)    FH: hyperlipidemia (Father)    FH: hypertension (Father, Mother)        SOCIAL HISTORY:  Smoking Status: [ ] Current, [ ] Former, [ ] Never  Pack Years:    MEDICATIONS:  Pulmonary:    Antimicrobials:    Anticoagulants:  enoxaparin Injectable 40 milliGRAM(s) SubCutaneous <User Schedule>    Onc:    GI/:  pantoprazole  Injectable 40 milliGRAM(s) IV Push daily  polyethylene glycol 3350 17 Gram(s) Oral every 12 hours PRN  senna 2 Tablet(s) Oral at bedtime    Endocrine:  atorvastatin 20 milliGRAM(s) Oral at bedtime  dextrose 50% Injectable 25 Gram(s) IV Push once  dextrose 50% Injectable 12.5 Gram(s) IV Push once  dextrose 50% Injectable 25 Gram(s) IV Push once  dextrose Oral Gel 15 Gram(s) Oral once PRN  glucagon  Injectable 1 milliGRAM(s) IntraMuscular once  insulin regular  human corrective regimen sliding scale   SubCutaneous every 6 hours    Cardiac:  phenylephrine    Infusion 1.7 MICROgram(s)/kG/Min IV Continuous <Continuous>    Other Medications:  acetaminophen     Tablet .. 650 milliGRAM(s) Oral every 6 hours PRN  chlorhexidine 0.12% Liquid 15 milliLiter(s) Oral Mucosa every 12 hours  chlorhexidine 2% Cloths 1 Application(s) Topical <User Schedule>  chlorhexidine 4% Liquid 1 Application(s) Topical once  dextrose 5%. 1000 milliLiter(s) IV Continuous <Continuous>  dextrose 5%. 1000 milliLiter(s) IV Continuous <Continuous>  fluticasone propionate 50 MICROgram(s)/spray Nasal Spray 1 Spray(s) Both Nostrils two times a day  levETIRAcetam  IVPB 750 milliGRAM(s) IV Intermittent every 12 hours  ondansetron Injectable 4 milliGRAM(s) IV Push every 8 hours PRN  propofol Infusion 9.993 MICROgram(s)/kG/Min IV Continuous <Continuous>  sodium bicarbonate  Infusion 0.158 mEq/kG/Hr IV Continuous <Continuous>  sodium chloride 0.9% lock flush 10 milliLiter(s) IV Push every 1 hour PRN      Allergies    amoxicillin (Rash)    Intolerances        Review of Systems:   •	General: negative  •	Skin/Breast: negative  •	Ophthalmologic: negative  •	ENMT: negative  •	Respiratory and Thorax: negative  •	Cardiovascular: negative  •	Gastrointestinal: negative  •	Genitourinary: negative  •	Musculoskeletal: negative  •	Neurological: negative  •	Psychiatric: negative  •	Hematology/Lymphatics: negative  •	Endocrine: negative  •	Allergic/Immunologic: negative    Physical Exam:   • Constitutional:	refer to dietitian/nutritionist note  • Eyes:	EOMI; PERRL; no drainage or redness  • ENMT:	No oral lesions; no gross abnormalities  • Neck	No bruits; no thyromegaly or nodules  • Breasts:	not examined  • Back:	No deformity or limitation of movement  • Respiratory:	Breath Sounds equal & clear to percussion & auscultation, no accessory muscle use  • Cardiovascular:	Regular rate & rhythm, normal S1, S2; no murmurs, gallops or rubs; no S3, S4  • Gastrointestinal:	Soft, non-tender, no hepatosplenomegaly, normal bowel sounds  • Genitourinary:	not examined  • Rectal: not examined  • Extremities:	No cyanosis, clubbing or edema  • Vascular:	Equal and normal pulses (carotid, femoral, dorsalis pedis)  • Neurologica:l	not examined  • Skin:	No lesions; no rash  • Lymph Nodes:	No lymphadedenopathy  • Musculoskeletal:	No joint pain, swelling or deformity; no limitation of movement      Vital Signs Last 24 Hrs  T(C): 37.9 (08 Mar 2023 14:06), Max: 38.2 (08 Mar 2023 05:27)  T(F): 100.2 (08 Mar 2023 14:06), Max: 100.7 (08 Mar 2023 05:27)  HR: 103 (08 Mar 2023 14:00) (88 - 141)  BP: 94/64 (08 Mar 2023 07:00) (94/64 - 152/98)  BP(mean): 76 (08 Mar 2023 07:00) (71 - 122)  RR: 24 (08 Mar 2023 14:00) (14 - 45)  SpO2: 99% (08 Mar 2023 14:00) (84% - 100%)    Parameters below as of 08 Mar 2023 14:00  Patient On (Oxygen Delivery Method): ventilator,AC    O2 Concentration (%): 50    03-07 @ 07:  -  08 @ 07:00  --------------------------------------------------------  IN: 1800.2 mL / OUT: 2095 mL / NET: -294.8 mL    03-08 @ 07:01  -  03-08 @ 14:12  --------------------------------------------------------  IN: 906.8 mL / OUT: 410 mL / NET: 496.8 mL      Mode: AC/ CMV (Assist Control/ Continuous Mandatory Ventilation)  RR (machine): 12  TV (machine): 400  FiO2: 50  PEEP: 5  ITime: 1  MAP: 9  PIP: 25      LABS:  ABG - ( 08 Mar 2023 09:12 )  pH, Arterial: 7.45  pH, Blood: x     /  pCO2: 33    /  pO2: 165   / HCO3: 23    / Base Excess: -0.4  /  SaO2: 99.4                CBC Full  -  ( 08 Mar 2023 03:00 )  WBC Count : 12.16 K/uL  RBC Count : 3.96 M/uL  Hemoglobin : 12.9 g/dL  Hematocrit : 39.1 %  Platelet Count - Automated : 133 K/uL  Mean Cell Volume : 98.7 fl  Mean Cell Hemoglobin : 32.6 pg  Mean Cell Hemoglobin Concentration : 33.0 gm/dL  Auto Neutrophil # : x  Auto Lymphocyte # : x  Auto Monocyte # : x  Auto Eosinophil # : x  Auto Basophil # : x  Auto Neutrophil % : x  Auto Lymphocyte % : x  Auto Monocyte % : x  Auto Eosinophil % : x  Auto Basophil % : x    03-08    130<L>  |  97  |  13  ----------------------------<  124<H>  4.5   |  21<L>  |  0.90    Ca    8.9      08 Mar 2023 07:39  Phos  2.3     03-08  Mg     1.7     03-08    TPro  6.5  /  Alb  3.6  /  TBili  0.7  /  DBili  x   /  AST  27  /  ALT  16  /  AlkPhos  81  03-08    PT/INR - ( 07 Mar 2023 18:00 )   PT: 13.9 sec;   INR: 1.17          PTT - ( 07 Mar 2023 18:00 )  PTT:27.5 sec      Urinalysis Basic - ( 07 Mar 2023 18:06 )    Color: x / Appearance: x / S.010 / pH: x  Gluc: x / Ketone: x  / Bili: x / Urobili: x   Blood: x / Protein: x / Nitrite: x   Leuk Esterase: x / RBC: x / WBC x   Sq Epi: x / Non Sq Epi: x / Bacteria: x    < from: Xray Chest 1 View- PORTABLE-Urgent (Xray Chest 1 View- PORTABLE-Urgent .) (23 @ 05:18) >    ACC: [id number] EXAM:  XR CHEST PORTABLE URGENT 1V   ORDERED BY: DOROTEO TINEO     PROCEDURE DATE:  2023          INTERPRETATION:  Portable chest    HISTORY: Right central line placement    IMPRESSION:    Right jugular venous catheter tip at superior vena cava has appeared   since prior exam earlier same day. Bilateral scattered diffuse lung   infiltrates, similar to mildly increasing. Tracheostomy unchanged. No   pneumothorax. No definite pleural effusion.    < end of copied text >              RADIOLOGY & ADDITIONAL STUDIES (The following images were personally reviewed):

## 2023-03-08 NOTE — CONSULT NOTE ADULT - SUBJECTIVE AND OBJECTIVE BOX
HISTORY OF PRESENT ILLNESS  Osiel Norwood is a 68-year-old male with a past medical history of type 2 diabetes mellitus, hyperlipidemia, iron deficiency anemia, tracheal stenosis (s/p tracheostomy) and glioblastoma (found to have a mass in 1/2022, s/p resection of left frontal enhancing tumor with GLEOLAN placed on 1/27/22 with pathology showing gliosarcoma, WHO grade IV s/p radiation and chemotherapy completed on 12/2022, repeat MRI showing recurrence of brain mass on 12/2022) who presented for further management, now s/p cerebral angiogram with Avastin treatment (3/7/23). Post-operative course was complicated by flash pulmonary edema, desaturation, new global aphasia, right sided weakness, lactic acidosis (pH 7.09, Bicarbonate 8, lactate 12.0, BHB 1.3) and hyperglycemia. Overnight, the patient was started on an insulin infusion. Endocrinology was consulted for recommendations regarding glycemic control.     CAPILLARY BLOOD GLUCOSE & INSULIN RECEIVED  183 mg/dL (03-07 @ 16:47)  161 mg/dL (03-07 @ 17:47)  204 mg/dL (03-07 @ 18:00)  248 mg/dL (03-07 @ 21:12) -> 4 units of lispro sliding scale.   323 mg/dL (03-08 @ 00:10)  313 mg/dL (03-08 @ 01:11)  305 mg/dL (03-08 @ 01:57) -> Insulin infusion started at 3 units/hr.  477 mg/dL (03-08 @ 03:00) -> Insulin infusion at 6 units/hr  379 mg/dL (03-08 @ 03:05) -> Insulin infusion at 6 units/hr  313 mg/dL (03-08 @ 04:00) -> Insulin infusion at 9 units/hr  285 mg/dL (03-08 @ 04:50) -> Insulin infusion at 9 units/hr  295 mg/dL (03-08 @ 05:05) -> Insulin infusion at 11 units/hr  272 mg/dL (03-08 @ 06:13) -> Insulin infusion at 12.5 units/hr  194 mg/dL (03-08 @ 07:04) -> Insulin infusion at 6.3 units/hr  124 mg/dL (03-08 @ 07:39) -> Insulin infusion at 6.3 units/hr  129 mg/dL (03-08 @ 08:20) -> Insulin infusion at 6.25 units/hr  115 mg/dL (03-08 @ 09:05) -> Insulin infusion at 3.1 units/hr  109 mg/dL (03-08 @ 10:04) -> Insulin infusion stopped.   130 mg/dL (03-08 @ 10:53)   166 mg/dL (03-08 @ 11:55) -> 10 units of NPH insulin.     DIABETES HISTORY  - Age at diagnosis:   - Symptoms at time of diagnosis:   - Current Therapy:  - History of other regimens:   - History of hypoglycemia:   - History of DKA/HHS:   - Complications:   - Home FSG:        > Fasting: *** mg/dL.        > Before meals: *** mg/dL.        > Bedtime: *** mg/dL.  - Diet:          > Breakfast:         > Lunch:        > Dinner:        > Snacks:  - Physical activity:    - Outpatient follow-up:     PAST MEDICAL & SURGICAL HISTORY  As per history of present illness.     FAMILY HISTORY  - Diabetes:  - Thyroid:  - Autoimmune:  - Other:    SOCIAL HISTORY  - Work:  - Alcohol:  - Smoking:  - Recreational Drugs:    ALLERGIES  amoxicillin (Rash)    CURRENT MEDICATIONS  acetaminophen     Tablet .. 650 milliGRAM(s) Oral every 6 hours PRN  atorvastatin 20 milliGRAM(s) Oral at bedtime  chlorhexidine 0.12% Liquid 15 milliLiter(s) Oral Mucosa every 12 hours  chlorhexidine 2% Cloths 1 Application(s) Topical <User Schedule>  chlorhexidine 4% Liquid 1 Application(s) Topical once  dextrose 5%. 1000 milliLiter(s) IV Continuous <Continuous>  dextrose 5%. 1000 milliLiter(s) IV Continuous <Continuous>  dextrose 50% Injectable 25 Gram(s) IV Push once  dextrose 50% Injectable 12.5 Gram(s) IV Push once  dextrose 50% Injectable 25 Gram(s) IV Push once  dextrose Oral Gel 15 Gram(s) Oral once PRN  enoxaparin Injectable 40 milliGRAM(s) SubCutaneous <User Schedule>  fluticasone propionate 50 MICROgram(s)/spray Nasal Spray 1 Spray(s) Both Nostrils two times a day  glucagon  Injectable 1 milliGRAM(s) IntraMuscular once  insulin regular  human corrective regimen sliding scale   SubCutaneous every 6 hours  levETIRAcetam  IVPB 750 milliGRAM(s) IV Intermittent every 12 hours  ondansetron Injectable 4 milliGRAM(s) IV Push every 8 hours PRN  pantoprazole  Injectable 40 milliGRAM(s) IV Push daily  phenylephrine    Infusion 1.7 MICROgram(s)/kG/Min IV Continuous <Continuous>  polyethylene glycol 3350 17 Gram(s) Oral every 12 hours PRN  propofol Infusion 9.993 MICROgram(s)/kG/Min IV Continuous <Continuous>  senna 2 Tablet(s) Oral at bedtime  sodium bicarbonate  Infusion 0.158 mEq/kG/Hr IV Continuous <Continuous>  sodium chloride 0.9% lock flush 10 milliLiter(s) IV Push every 1 hour PRN    REVIEW OF SYSTEMS  Constitutional:  Negative fever, chills or loss of appetite.  Eyes:  Negative blurry vision or double vision.  Cardiovascular:  Negative for chest pain or palpitations.  Respiratory:  Negative for cough, wheezing, or shortness of breath.   Gastrointestinal:  Negative for nausea, vomiting, diarrhea, constipation, or abdominal pain.  Genitourinary:  Negative frequency, urgency or dysuria.  Neurologic:  No headache, confusion, dizziness, lightheadedness.    PHYSICAL EXAM  Vital Signs Last 24 Hrs  T(C): 37.3 (08 Mar 2023 09:06), Max: 38.2 (08 Mar 2023 05:27)  T(F): 99.2 (08 Mar 2023 09:06), Max: 100.7 (08 Mar 2023 05:27)  HR: 111 (08 Mar 2023 12:49) (88 - 141)  BP: 94/64 (08 Mar 2023 07:00) (94/64 - 152/98)  BP(mean): 76 (08 Mar 2023 07:00) (71 - 122)  RR: 23 (08 Mar 2023 12:00) (14 - 45)  SpO2: 99% (08 Mar 2023 12:49) (84% - 100%)    Parameters below as of 08 Mar 2023 12:00  Patient On (Oxygen Delivery Method): ventilator    O2 Concentration (%): 50Constitutional: Awake, alert, in no acute distress.   HEENT: Normocephalic, atraumatic, JUAN, no proptosis or lid retraction.   Neck: supple, no acanthosis, no thyromegaly or palpable thyroid nodules.  Respiratory: Lungs clear to ausculation bilaterally.   Cardiovascular: regular rhythm, normal S1 and S2, no audible murmurs.   GI: soft, non-tender, non-distended, bowel sounds present, no masses appreciated.  Extremities: No lower extremity edema, peripheral pulses present.   Skin: no rashes.   Psychiatric: AAO x 3. Normal affect/mood.     LABS  CBC - WBC/HGB/HTC/PLT: 12.16/12.9/39.1/133 (03-08-23)  BMP: Na/K/Cl/Bicarb/BUN/Cr/Gluc: 130/4.5/97/21/13/0.90/124 (03-08-23)  Anion Gap: 12 (03-08-23)  eGFR: 94 (03-08-23)  Calcium: 8.9 (03-08-23)  Phosphorus: 2.3 (03-08-23)  Magnesium: 1.7 (03-08-23)  LFT - Alb/Tprot/Tbili/Dbili/AlkPhos/ALT/AST: 3.6/--/0.7/--/81/16/27 (03-08-23)  PT/aPTT/INR: 13.9/27.5/1.17 (03-07-23)  Thyroid Stimulating Hormone, Serum: 1.210 (03-08-23)    ASSESSMENT / RECOMMENDATIONS    A1C: 5.8 %  BUN: 13  Creatinine: 0.90  GFR: 94  Weight: 71.214  BMI: 24.6  EF:     # Type 2 diabetes mellitus  - Please continue lantus *** units at bedtime.   - Continue lispro *** units before each meal.  - Continue lispro moderate / low dose sliding scale before meals and at bedtime.  - Patient's fingerstick glucose goal is 100-180 mg/dL.    - For discharge, patient can ***.    - Patient can follow up at discharge with [facility] by calling [phone] to make an appointment.      Case discussed with Dr. Hoang. Primary team updated.       Fabrice Madrid    Endocrinology Fellow    Service Pager: [phone]  HISTORY OF PRESENT ILLNESS  Osiel Norwood is a 68-year-old male with a past medical history of type 2 diabetes mellitus, hyperlipidemia, iron deficiency anemia, tracheal stenosis (s/p tracheostomy) and glioblastoma (found to have a mass in 1/2022, s/p resection of left frontal enhancing tumor with GLEOLAN placed on 1/27/22 with pathology showing gliosarcoma, WHO grade IV s/p radiation and chemotherapy completed on 12/2022, repeat MRI showing recurrence of brain mass on 12/2022) who presented for further management, now s/p cerebral angiogram with Avastin treatment (3/7/23). Post-operative course was complicated by flash pulmonary edema, desaturation, new global aphasia, right sided weakness, lactic acidosis (pH 7.09, Bicarbonate 8, lactate 12.0, BHB 1.3) and hyperglycemia. Overnight, the patient was started on an insulin infusion. Endocrinology was consulted for recommendations regarding glycemic control.     CAPILLARY BLOOD GLUCOSE & INSULIN RECEIVED  183 mg/dL (03-07 @ 16:47)  161 mg/dL (03-07 @ 17:47)  204 mg/dL (03-07 @ 18:00)  248 mg/dL (03-07 @ 21:12) -> 4 units of lispro sliding scale.   323 mg/dL (03-08 @ 00:10)  313 mg/dL (03-08 @ 01:11)  305 mg/dL (03-08 @ 01:57) -> Insulin infusion started at 3 units/hr.  477 mg/dL (03-08 @ 03:00) -> Insulin infusion at 6 units/hr  379 mg/dL (03-08 @ 03:05) -> Insulin infusion at 6 units/hr  313 mg/dL (03-08 @ 04:00) -> Insulin infusion at 9 units/hr  285 mg/dL (03-08 @ 04:50) -> Insulin infusion at 9 units/hr  295 mg/dL (03-08 @ 05:05) -> Insulin infusion at 11 units/hr  272 mg/dL (03-08 @ 06:13) -> Insulin infusion at 12.5 units/hr  194 mg/dL (03-08 @ 07:04) -> Insulin infusion at 6.3 units/hr  124 mg/dL (03-08 @ 07:39) -> Insulin infusion at 6.3 units/hr  129 mg/dL (03-08 @ 08:20) -> Insulin infusion at 6.25 units/hr  115 mg/dL (03-08 @ 09:05) -> Insulin infusion at 3.1 units/hr  109 mg/dL (03-08 @ 10:04) -> Insulin infusion stopped.   130 mg/dL (03-08 @ 10:53)   166 mg/dL (03-08 @ 11:55) -> 10 units of NPH insulin.     DIABETES HISTORY  - Age at diagnosis:   - Symptoms at time of diagnosis:   - Current Therapy: Metformin 1,000 mg BID, Lantus 16 units at bedtime and Humalog 5 units TID.   - History of other regimens:   - History of hypoglycemia:   - History of DKA/HHS:   - Complications:   - Home FSG:        > Fasting: *** mg/dL.        > Before meals: *** mg/dL.        > Bedtime: *** mg/dL.  - Diet:          > Breakfast:         > Lunch:        > Dinner:        > Snacks:  - Physical activity:    - Outpatient follow-up:     PAST MEDICAL & SURGICAL HISTORY  As per history of present illness.     FAMILY HISTORY  - Diabetes:  - Thyroid:  - Autoimmune:  - Other:    SOCIAL HISTORY  - Work:  - Alcohol:  - Smoking:  - Recreational Drugs:    ALLERGIES  amoxicillin (Rash)    CURRENT MEDICATIONS  acetaminophen     Tablet .. 650 milliGRAM(s) Oral every 6 hours PRN  atorvastatin 20 milliGRAM(s) Oral at bedtime  chlorhexidine 0.12% Liquid 15 milliLiter(s) Oral Mucosa every 12 hours  chlorhexidine 2% Cloths 1 Application(s) Topical <User Schedule>  chlorhexidine 4% Liquid 1 Application(s) Topical once  dextrose 5%. 1000 milliLiter(s) IV Continuous <Continuous>  dextrose 5%. 1000 milliLiter(s) IV Continuous <Continuous>  dextrose 50% Injectable 25 Gram(s) IV Push once  dextrose 50% Injectable 12.5 Gram(s) IV Push once  dextrose 50% Injectable 25 Gram(s) IV Push once  dextrose Oral Gel 15 Gram(s) Oral once PRN  enoxaparin Injectable 40 milliGRAM(s) SubCutaneous <User Schedule>  fluticasone propionate 50 MICROgram(s)/spray Nasal Spray 1 Spray(s) Both Nostrils two times a day  glucagon  Injectable 1 milliGRAM(s) IntraMuscular once  insulin regular  human corrective regimen sliding scale   SubCutaneous every 6 hours  levETIRAcetam  IVPB 750 milliGRAM(s) IV Intermittent every 12 hours  ondansetron Injectable 4 milliGRAM(s) IV Push every 8 hours PRN  pantoprazole  Injectable 40 milliGRAM(s) IV Push daily  phenylephrine    Infusion 1.7 MICROgram(s)/kG/Min IV Continuous <Continuous>  polyethylene glycol 3350 17 Gram(s) Oral every 12 hours PRN  propofol Infusion 9.993 MICROgram(s)/kG/Min IV Continuous <Continuous>  senna 2 Tablet(s) Oral at bedtime  sodium bicarbonate  Infusion 0.158 mEq/kG/Hr IV Continuous <Continuous>  sodium chloride 0.9% lock flush 10 milliLiter(s) IV Push every 1 hour PRN    REVIEW OF SYSTEMS  Constitutional:  Negative fever, chills or loss of appetite.  Eyes:  Negative blurry vision or double vision.  Cardiovascular:  Negative for chest pain or palpitations.  Respiratory:  Negative for cough, wheezing, or shortness of breath.   Gastrointestinal:  Negative for nausea, vomiting, diarrhea, constipation, or abdominal pain.  Genitourinary:  Negative frequency, urgency or dysuria.  Neurologic:  No headache, confusion, dizziness, lightheadedness.    PHYSICAL EXAM  Vital Signs Last 24 Hrs  T(C): 37.3 (08 Mar 2023 09:06), Max: 38.2 (08 Mar 2023 05:27)  T(F): 99.2 (08 Mar 2023 09:06), Max: 100.7 (08 Mar 2023 05:27)  HR: 111 (08 Mar 2023 12:49) (88 - 141)  BP: 94/64 (08 Mar 2023 07:00) (94/64 - 152/98)  BP(mean): 76 (08 Mar 2023 07:00) (71 - 122)  RR: 23 (08 Mar 2023 12:00) (14 - 45)  SpO2: 99% (08 Mar 2023 12:49) (84% - 100%)    Parameters below as of 08 Mar 2023 12:00  Patient On (Oxygen Delivery Method): ventilator    O2 Concentration (%): 50Constitutional: Awake, alert, in no acute distress.   HEENT: Normocephalic, atraumatic, JUAN, no proptosis or lid retraction.   Neck: supple, no acanthosis, no thyromegaly or palpable thyroid nodules.  Respiratory: Lungs clear to ausculation bilaterally.   Cardiovascular: regular rhythm, normal S1 and S2, no audible murmurs.   GI: soft, non-tender, non-distended, bowel sounds present, no masses appreciated.  Extremities: No lower extremity edema, peripheral pulses present.   Skin: no rashes.   Psychiatric: AAO x 3. Normal affect/mood.     LABS  CBC - WBC/HGB/HTC/PLT: 12.16/12.9/39.1/133 (03-08-23)  BMP: Na/K/Cl/Bicarb/BUN/Cr/Gluc: 130/4.5/97/21/13/0.90/124 (03-08-23)  Anion Gap: 12 (03-08-23)  eGFR: 94 (03-08-23)  Calcium: 8.9 (03-08-23)  Phosphorus: 2.3 (03-08-23)  Magnesium: 1.7 (03-08-23)  LFT - Alb/Tprot/Tbili/Dbili/AlkPhos/ALT/AST: 3.6/--/0.7/--/81/16/27 (03-08-23)  PT/aPTT/INR: 13.9/27.5/1.17 (03-07-23)  Thyroid Stimulating Hormone, Serum: 1.210 (03-08-23)    ASSESSMENT / RECOMMENDATIONS    A1C: 5.8 %  BUN: 13  Creatinine: 0.90  GFR: 94  Weight: 71.214  BMI: 24.6  EF:     # Type 2 diabetes mellitus  - Please continue lantus *** units at bedtime.   - Continue lispro *** units before each meal.  - Continue lispro moderate / low dose sliding scale before meals and at bedtime.  - Patient's fingerstick glucose goal is 100-180 mg/dL.    - For discharge, patient can ***.    - Patient can follow up at discharge with [facility] by calling [phone] to make an appointment.      Case discussed with Dr. Hoang. Primary team updated.       Fabrice Madrid    Endocrinology Fellow    Service Pager: [phone]  HISTORY OF PRESENT ILLNESS  Osiel Norwood is a 68-year-old male with a past medical history of type 2 diabetes mellitus, hyperlipidemia, iron deficiency anemia, tracheal stenosis (s/p tracheostomy) and glioblastoma (found to have a mass in 1/2022, s/p resection of left frontal enhancing tumor with GLEOLAN placed on 1/27/22 with pathology showing gliosarcoma, WHO grade IV s/p radiation and chemotherapy completed on 12/2022, repeat MRI showing recurrence of brain mass on 12/2022) who presented for further management, now s/p cerebral angiogram with Avastin treatment (3/7/23). Post-operative course was complicated by flash pulmonary edema, desaturation, new global aphasia, right sided weakness, lactic acidosis (pH 7.09, Bicarbonate 8, lactate 12.0, BHB 1.3) and hyperglycemia. Overnight, the patient was started on an insulin infusion. Endocrinology was consulted for recommendations regarding glycemic control.     CAPILLARY BLOOD GLUCOSE & INSULIN RECEIVED  183 mg/dL (03-07 @ 16:47)  161 mg/dL (03-07 @ 17:47)  204 mg/dL (03-07 @ 18:00)  248 mg/dL (03-07 @ 21:12) -> 4 units of lispro sliding scale.   323 mg/dL (03-08 @ 00:10)  313 mg/dL (03-08 @ 01:11)  305 mg/dL (03-08 @ 01:57) -> Insulin infusion started at 3 units/hr.  477 mg/dL (03-08 @ 03:00) -> Insulin infusion at 6 units/hr  379 mg/dL (03-08 @ 03:05) -> Insulin infusion at 6 units/hr  313 mg/dL (03-08 @ 04:00) -> Insulin infusion at 9 units/hr  285 mg/dL (03-08 @ 04:50) -> Insulin infusion at 9 units/hr  295 mg/dL (03-08 @ 05:05) -> Insulin infusion at 11 units/hr  272 mg/dL (03-08 @ 06:13) -> Insulin infusion at 12.5 units/hr  194 mg/dL (03-08 @ 07:04) -> Insulin infusion at 6.3 units/hr  124 mg/dL (03-08 @ 07:39) -> Insulin infusion at 6.3 units/hr  129 mg/dL (03-08 @ 08:20) -> Insulin infusion at 6.25 units/hr  115 mg/dL (03-08 @ 09:05) -> Insulin infusion at 3.1 units/hr  109 mg/dL (03-08 @ 10:04) -> Insulin infusion stopped.   130 mg/dL (03-08 @ 10:53)   166 mg/dL (03-08 @ 11:55) -> 10 units of NPH insulin.     DIABETES HISTORY  History obtained from patient's daughter and endocrinology visits outpatient.   - Age at diagnosis: 1.5 years ago per endocrinology notes; many years ago per daughter.   - Current Therapy: Metformin 500 mg twice daily + Lantus 16 units at bedtime and Humalog 5 units TID.   - History of other regimens:   - History of hypoglycemia:   - History of DKA/HHS:   - Complications:   - Home FSG:        > Fasting: *** mg/dL.        > Before meals: *** mg/dL.        > Bedtime: *** mg/dL.  - Diet:          > Breakfast:         > Lunch:        > Dinner:        > Snacks:  - Physical activity:    - Outpatient follow-up:     PAST MEDICAL & SURGICAL HISTORY  As per history of present illness.     FAMILY HISTORY  - Diabetes:  - Thyroid:  - Autoimmune:  - Other:    SOCIAL HISTORY  - Work:  - Alcohol:  - Smoking:  - Recreational Drugs:    ALLERGIES  amoxicillin (Rash)    CURRENT MEDICATIONS  acetaminophen     Tablet .. 650 milliGRAM(s) Oral every 6 hours PRN  atorvastatin 20 milliGRAM(s) Oral at bedtime  chlorhexidine 0.12% Liquid 15 milliLiter(s) Oral Mucosa every 12 hours  chlorhexidine 2% Cloths 1 Application(s) Topical <User Schedule>  chlorhexidine 4% Liquid 1 Application(s) Topical once  dextrose 5%. 1000 milliLiter(s) IV Continuous <Continuous>  dextrose 5%. 1000 milliLiter(s) IV Continuous <Continuous>  dextrose 50% Injectable 25 Gram(s) IV Push once  dextrose 50% Injectable 12.5 Gram(s) IV Push once  dextrose 50% Injectable 25 Gram(s) IV Push once  dextrose Oral Gel 15 Gram(s) Oral once PRN  enoxaparin Injectable 40 milliGRAM(s) SubCutaneous <User Schedule>  fluticasone propionate 50 MICROgram(s)/spray Nasal Spray 1 Spray(s) Both Nostrils two times a day  glucagon  Injectable 1 milliGRAM(s) IntraMuscular once  insulin regular  human corrective regimen sliding scale   SubCutaneous every 6 hours  levETIRAcetam  IVPB 750 milliGRAM(s) IV Intermittent every 12 hours  ondansetron Injectable 4 milliGRAM(s) IV Push every 8 hours PRN  pantoprazole  Injectable 40 milliGRAM(s) IV Push daily  phenylephrine    Infusion 1.7 MICROgram(s)/kG/Min IV Continuous <Continuous>  polyethylene glycol 3350 17 Gram(s) Oral every 12 hours PRN  propofol Infusion 9.993 MICROgram(s)/kG/Min IV Continuous <Continuous>  senna 2 Tablet(s) Oral at bedtime  sodium bicarbonate  Infusion 0.158 mEq/kG/Hr IV Continuous <Continuous>  sodium chloride 0.9% lock flush 10 milliLiter(s) IV Push every 1 hour PRN    REVIEW OF SYSTEMS  Constitutional:  Negative fever, chills or loss of appetite.  Eyes:  Negative blurry vision or double vision.  Cardiovascular:  Negative for chest pain or palpitations.  Respiratory:  Negative for cough, wheezing, or shortness of breath.   Gastrointestinal:  Negative for nausea, vomiting, diarrhea, constipation, or abdominal pain.  Genitourinary:  Negative frequency, urgency or dysuria.  Neurologic:  No headache, confusion, dizziness, lightheadedness.    PHYSICAL EXAM  Vital Signs Last 24 Hrs  T(C): 37.3 (08 Mar 2023 09:06), Max: 38.2 (08 Mar 2023 05:27)  T(F): 99.2 (08 Mar 2023 09:06), Max: 100.7 (08 Mar 2023 05:27)  HR: 111 (08 Mar 2023 12:49) (88 - 141)  BP: 94/64 (08 Mar 2023 07:00) (94/64 - 152/98)  BP(mean): 76 (08 Mar 2023 07:00) (71 - 122)  RR: 23 (08 Mar 2023 12:00) (14 - 45)  SpO2: 99% (08 Mar 2023 12:49) (84% - 100%)    Parameters below as of 08 Mar 2023 12:00  Patient On (Oxygen Delivery Method): ventilator    O2 Concentration (%): 50Constitutional: Awake, alert, in no acute distress.   HEENT: Normocephalic, atraumatic, JUAN, no proptosis or lid retraction.   Neck: supple, no acanthosis, no thyromegaly or palpable thyroid nodules.  Respiratory: Lungs clear to ausculation bilaterally.   Cardiovascular: regular rhythm, normal S1 and S2, no audible murmurs.   GI: soft, non-tender, non-distended, bowel sounds present, no masses appreciated.  Extremities: No lower extremity edema, peripheral pulses present.   Skin: no rashes.   Psychiatric: AAO x 3. Normal affect/mood.     LABS  CBC - WBC/HGB/HTC/PLT: 12.16/12.9/39.1/133 (03-08-23)  BMP: Na/K/Cl/Bicarb/BUN/Cr/Gluc: 130/4.5/97/21/13/0.90/124 (03-08-23)  Anion Gap: 12 (03-08-23)  eGFR: 94 (03-08-23)  Calcium: 8.9 (03-08-23)  Phosphorus: 2.3 (03-08-23)  Magnesium: 1.7 (03-08-23)  LFT - Alb/Tprot/Tbili/Dbili/AlkPhos/ALT/AST: 3.6/--/0.7/--/81/16/27 (03-08-23)  PT/aPTT/INR: 13.9/27.5/1.17 (03-07-23)  Thyroid Stimulating Hormone, Serum: 1.210 (03-08-23)    ASSESSMENT / RECOMMENDATIONS    A1C: 5.8 %  BUN: 13  Creatinine: 0.90  GFR: 94  Weight: 71.214  BMI: 24.6  EF:     # Type 2 diabetes mellitus  - Please continue lantus *** units at bedtime.   - Continue lispro *** units before each meal.  - Continue lispro moderate / low dose sliding scale before meals and at bedtime.  - Patient's fingerstick glucose goal is 100-180 mg/dL.    - For discharge, patient can ***.    - Patient can follow up at discharge with [facility] by calling [phone] to make an appointment.      Case discussed with Dr. Hoang. Primary team updated.       Fabrice Madrid    Endocrinology Fellow    Service Pager: [phone]  HISTORY OF PRESENT ILLNESS  Osiel Norwood is a 68-year-old male with a past medical history of type 2 diabetes mellitus, hyperlipidemia, iron deficiency anemia, tracheal stenosis (s/p tracheostomy) and glioblastoma (found to have a mass in 2022, s/p resection of left frontal enhancing tumor with GLEOLAN placed on 22 with pathology showing gliosarcoma, WHO grade IV s/p radiation and chemotherapy completed on 2022, repeat MRI showing recurrence of brain mass on 2022) who presented for further management, now s/p cerebral angiogram with Avastin treatment (3/7/23). Post-operative course was complicated by flash pulmonary edema, desaturation, new global aphasia, right sided weakness, lactic acidosis (pH 7.09, Bicarbonate 8, lactate 12.0, BHB 1.3) and hyperglycemia. Overnight, the patient was started on an insulin infusion. Endocrinology was consulted for recommendations regarding glycemic control.     CAPILLARY BLOOD GLUCOSE & INSULIN RECEIVED  183 mg/dL ( @ 16:47)  161 mg/dL ( @ 17:47)  204 mg/dL ( @ 18:00)  248 mg/dL ( @ 21:12) -> 4 units of lispro sliding scale.   323 mg/dL ( @ 00:10)  313 mg/dL ( @ 01:11)  305 mg/dL ( @ 01:57) -> Insulin infusion started at 3 units/hr.  477 mg/dL ( 03:00) -> Insulin infusion at 6 units/hr  379 mg/dL ( 03:05) -> Insulin infusion at 6 units/hr  313 mg/dL ( 04:00) -> Insulin infusion at 9 units/hr  285 mg/dL ( @ 04:50) -> Insulin infusion at 9 units/hr  295 mg/dL ( 05:05) -> Insulin infusion at 11 units/hr  272 mg/dL ( 06:13) -> Insulin infusion at 12.5 units/hr  194 mg/dL ( 07:04) -> Insulin infusion at 6.3 units/hr  124 mg/dL ( 07:39) -> Insulin infusion at 6.3 units/hr  129 mg/dL ( @ 08:20) -> Insulin infusion at 6.25 units/hr  115 mg/dL ( @ 09:05) -> Insulin infusion at 3.1 units/hr  109 mg/dL ( @ 10:04) -> Insulin infusion stopped.   130 mg/dL ( @ 10:53)   166 mg/dL ( @ 11:55) -> 10 units of NPH insulin.     DIABETES HISTORY  History obtained from patient's daughter and endocrinology visits outpatient.   - Age at diagnosis: 1.5 years ago per endocrinology notes; many years ago per daughter.   - Current/previous therapy: Metformin 500 mg twice daily. He was previously on a a sliding scale of Lantus (administering 10 units at bedtime if blood glucose >125 mg/dL) and Humalog (2 units if 161-200 mg/dL; 2 units if 201-250 mg/dL; 3 units if 251-300 mg/dL; 5 units if >301 mg/dL). Per endocrinology note on 2022, all insulin was discontinued as his A1C was down to 5.6% (2022) from 6.6% (3/2022).   - History of hypoglycemia: Not experiencing hypoglycemic episodes. Per daughter, when he was on steroids in the past, and also receiving insulin, he used to have occasional hypoglycemic episodes; however, he no longer experiences them.   - History of DKA/HHS: Denied.   - Complications: Denied.   - Home FSG:        > Fastin-120s mg/dL.        > Before meals: 120-140s mg/dL.  - Diet:          > Breakfast: Plantains or yuca with eggs or salami.         > Lunch/dinner: Rice, beans and meat.         > Snacks: Plantain chips, peanuts.   - Outpatient follow-up: [facility] at [place], last seen on 2022.    PAST MEDICAL & SURGICAL HISTORY  As per history of present illness.     ALLERGIES  amoxicillin (Rash)    CURRENT MEDICATIONS  acetaminophen     Tablet .. 650 milliGRAM(s) Oral every 6 hours PRN  atorvastatin 20 milliGRAM(s) Oral at bedtime  chlorhexidine 0.12% Liquid 15 milliLiter(s) Oral Mucosa every 12 hours  chlorhexidine 2% Cloths 1 Application(s) Topical <User Schedule>  chlorhexidine 4% Liquid 1 Application(s) Topical once  dextrose 5%. 1000 milliLiter(s) IV Continuous <Continuous>  dextrose 5%. 1000 milliLiter(s) IV Continuous <Continuous>  dextrose 50% Injectable 25 Gram(s) IV Push once  dextrose 50% Injectable 12.5 Gram(s) IV Push once  dextrose 50% Injectable 25 Gram(s) IV Push once  dextrose Oral Gel 15 Gram(s) Oral once PRN  enoxaparin Injectable 40 milliGRAM(s) SubCutaneous <User Schedule>  fluticasone propionate 50 MICROgram(s)/spray Nasal Spray 1 Spray(s) Both Nostrils two times a day  glucagon  Injectable 1 milliGRAM(s) IntraMuscular once  insulin regular  human corrective regimen sliding scale   SubCutaneous every 6 hours  levETIRAcetam  IVPB 750 milliGRAM(s) IV Intermittent every 12 hours  ondansetron Injectable 4 milliGRAM(s) IV Push every 8 hours PRN  pantoprazole  Injectable 40 milliGRAM(s) IV Push daily  phenylephrine    Infusion 1.7 MICROgram(s)/kG/Min IV Continuous <Continuous>  polyethylene glycol 3350 17 Gram(s) Oral every 12 hours PRN  propofol Infusion 9.993 MICROgram(s)/kG/Min IV Continuous <Continuous>  senna 2 Tablet(s) Oral at bedtime  sodium bicarbonate  Infusion 0.158 mEq/kG/Hr IV Continuous <Continuous>  sodium chloride 0.9% lock flush 10 milliLiter(s) IV Push every 1 hour PRN    REVIEW OF SYSTEMS  Unable to obtain as he was sedated.     PHYSICAL EXAM  Vital Signs Last 24 Hrs  T(C): 37.3 (08 Mar 2023 09:06), Max: 38.2 (08 Mar 2023 05:27)  T(F): 99.2 (08 Mar 2023 09:06), Max: 100.7 (08 Mar 2023 05:27)  HR: 111 (08 Mar 2023 12:49) (88 - 141)  BP: 94/64 (08 Mar 2023 07:00) (94/64 - 152/98)  BP(mean): 76 (08 Mar 2023 07:00) (71 - 122)  RR: 23 (08 Mar 2023 12:00) (14 - 45)  SpO2: 99% (08 Mar 2023 12:49) (84% - 100%)    Parameters below as of 08 Mar 2023 12:00  Patient On (Oxygen Delivery Method): ventilator  O2 Concentration (%): 50    Constitutional: Sedated, on mechanical ventilation via tracheostomy.   HEENT: (+) Gauze dressing in place.   Neck: (+) Tracheostomy.   Respiratory: Lungs clear to ausculation bilaterally.   Cardiovascular: regular rhythm, normal S1 and S2, no audible murmurs.   GI: soft, non-distended, bowel sounds present.  Extremities: No lower extremity edema.    LABS  CBC - WBC/HGB/HTC/PLT: 12.16/12.9/39.1/133 (23)  BMP: Na/K/Cl/Bicarb/BUN/Cr/Gluc: 130/4.5/97/21/13/0.90/124 (23)  Anion Gap: 12 (23)  eGFR: 94 (23)  Calcium: 8.9 (23)  Phosphorus: 2.3 (23)  Magnesium: 1.7 (23)  LFT - Alb/Tprot/Tbili/Dbili/AlkPhos/ALT/AST: 3.6/--/0.7/--/81/16/27 (23)  PT/aPTT/INR: 13.9/27.5/1.17 (23)  Thyroid Stimulating Hormone, Serum: 1.210 (23)    ASSESSMENT / RECOMMENDATIONS  Mr. Norwood is a 68-year-old male with a past medical history of type 2 diabetes mellitus, hyperlipidemia, iron deficiency anemia, tracheal stenosis (s/p tracheostomy) and glioblastoma (found to have a mass in 2022, s/p resection of left frontal enhancing tumor with GLEOLAN placed on 22 with pathology showing gliosarcoma, WHO grade IV s/p radiation and chemotherapy completed on 2022, repeat MRI showing recurrence of brain mass on 2022) who presented for further management, now s/p cerebral angiogram with Avastin treatment (3/7/23). Post-operative course was complicated by flash pulmonary edema, desaturation, new global aphasia, right sided weakness, lactic acidosis and hyperglycemia. Overnight, the patient was started on an insulin infusion, now transitioned to basal-bolus regimen with 10 units of NPH. Endocrinology was consulted for recommendations regarding glycemic control.     A1C: 5.8 %  BUN: 13  Creatinine: 0.90  GFR: 94  Weight: 71.214  BMI: 24.6    # Type 2 diabetes mellitus  - Please continue with lantus 18 units at bedtime.   - Continue lispro moderate dose sliding scale every 6 hours.   - Patient's fingerstick glucose goal is 100-180 mg/dL.      Case discussed with Dr. Hoang. Primary team updated.       Fabrice Madrid    Endocrinology Fellow    Service Pager: [phone]

## 2023-03-08 NOTE — PROGRESS NOTE ADULT - SUBJECTIVE AND OBJECTIVE BOX
INTERVAL HISTORY: HPI:  67 y/o right handed male with pmhx of T2DM, HLD, JAGDISH, hyponatremia, tracheal stenosis s/p tracheostomy, and glioblastoma presents for cerebral angiogram with Avastin treatment.    Daughter states in January 2022, patient had onset of confusion, dizziness, and speech difficulty while in [place] and was diagnosed with brain mass. Pt evaluated upon return to the [place] at [facility] ED with CT head revealing left frontal brain mass. Pt underwent resection of left frontal enhancing tumor with GLEOLAN placed 1/27/2022. Path showed gliosarcoma, WHO grade IV, IDH wild-type, EGFR non amplified, MGMT promotor methylated. Pt was discharged to rehab and underwent radiation treatment and Temodar chemotherapy (completed 12/22). MRI 12/22 showed recurrence of brain mass and pt presents today referred by Dr. Mackenzie for trial participation cerebral angiogram with Avastin treatment.    Pt complains of dizziness. Denies headache, nausea, vomiting, weakness, numbness, chest pain, dyspnea.   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  Type 2 diabetes mellitus  Hyperlipidemia  Iron deficiency anemia  Nephrolithiasis  Thrombocytopenia  Hyponatremia  BPH (benign prostatic hyperplasia)  S/P craniotomy  H/O tracheostomy    REVIEW OF SYSTEMS: [x] Unable to Assess due to neurologic exam   [ ] All ROS addressed below are non-contributory, except:  Neuro: [ ] Headache [ ] Back pain [ ] Numbness [ ] Weakness [ ] Ataxia [ ] Dizziness [ ] Aphasia [ ] Dysarthria [ ] Visual disturbance  Resp: [ ] Shortness of breath/dyspnea, [ ] Orthopnea [ ] Cough  CV: [ ] Chest pain [ ] Palpitation [ ] Lightheadedness [ ] Syncope  Renal: [ ] Thirst [ ] Edema  GI: [ ] Nausea [ ] Emesis [ ] Abdominal pain [ ] Constipation [ ] Diarrhea  Hem: [ ] Hematemesis [ ] bright red blood per rectum  ID: [ ] Fever [ ] Chills [ ] Dysuria  ENT: [ ] Rhinorrhea      ICU Vital Signs Last 24 Hrs  T(C): 38.2 (08 Mar 2023 05:27), Max: 38.2 (08 Mar 2023 05:27)  T(F): 100.7 (08 Mar 2023 05:27), Max: 100.7 (08 Mar 2023 05:27)  HR: 123 (08 Mar 2023 06:00) (88 - 141)  BP: 96/70 (08 Mar 2023 06:00) (95/69 - 152/98)  BP(mean): 79 (08 Mar 2023 06:00) (71 - 122)  ABP: 99/58 (08 Mar 2023 06:00) (90/51 - 165/87)  ABP(mean): 68 (08 Mar 2023 06:00) (63 - 112)  RR: 45 (08 Mar 2023 06:00) (14 - 45)  SpO2: 95% (08 Mar 2023 06:00) (84% - 100%)      03-07-23 @ 07:01  -  03-08-23 @ 06:43  --------------------------------------------------------  IN: 1793.9 mL / OUT: 1975 mL / NET: -181.1 mL      Mode: AC/ CMV (Assist Control/ Continuous Mandatory Ventilation), RR (machine): 12, TV (machine): 450, FiO2: 60, PEEP: 6, ITime: 1, MAP: 6.3, PIP: 12    PHYSICAL EXAM:  General: No Acute Distress,  Neurological: Grimace to pain, eyes open spontaneously, tracks, pupils 3mm reactive, non-verbal, not following commands, LUE AG purposeful, LLE spontaneous purposeful, RUE WD, RLE WD  Pulmonary: +trach  Cardiovascular: S1, S2, tachycardiac   Gastrointestinal: Soft, Nontender, Nondistended   Extremities: No calf tenderness   Incision: CDI no hematoma, DP +w/ doppler     MEDICATIONS:   acetaminophen     Tablet .. 650 milliGRAM(s) Oral every 6 hours PRN  atorvastatin 20 milliGRAM(s) Oral at bedtime  Bevacizumab (AVASTIN) 1065 milliGRAM(s) 1065 milliGRAM(s) IntraArterial once  chlorhexidine 0.12% Liquid 15 milliLiter(s) Oral Mucosa every 12 hours  chlorhexidine 4% Liquid 1 Application(s) Topical once  dextrose 50% Injectable 50 milliLiter(s) IV Push every 15 minutes  dextrose 50% Injectable 25 milliLiter(s) IV Push every 15 minutes  fluticasone propionate 50 MICROgram(s)/spray Nasal Spray 1 Spray(s) Both Nostrils two times a day  glucagon  Injectable 1 milliGRAM(s) IntraMuscular once  insulin lispro (ADMELOG) corrective regimen sliding scale   SubCutaneous Before meals and at bedtime  insulin regular Infusion 3 Unit(s)/Hr (3 mL/Hr) IV Continuous <Continuous>  levETIRAcetam  IVPB 750 milliGRAM(s) IV Intermittent every 12 hours  Mannitol 20% 250 milliLiter(s) 250 milliLiter(s) IntraArterial once  ondansetron Injectable 4 milliGRAM(s) IV Push every 8 hours PRN  pantoprazole  Injectable 40 milliGRAM(s) IV Push daily  polyethylene glycol 3350 17 Gram(s) Oral every 12 hours PRN  propofol Infusion 10 MICROgram(s)/kG/Min (4.27 mL/Hr) IV Continuous <Continuous>  senna 2 Tablet(s) Oral at bedtime  sodium bicarbonate  Infusion 0.158 mEq/kG/Hr (75 mL/Hr) IV Continuous <Continuous>  sodium chloride 0.9% lock flush 10 milliLiter(s) IV Push every 1 hour PRN    LABS:                       12.9   12.16 )-----------( 133      ( 08 Mar 2023 03:00 )             39.1     03-08    125<L>  |  93<L>  |  13  ----------------------------<  285<H>  4.9   |  14<L>  |  0.95    Ca    8.7      08 Mar 2023 04:50  Phos  4.3     03-08  Mg     1.8     03-08    TPro  6.5  /  Alb  3.6  /  TBili  0.7  /  DBili  x   /  AST  27  /  ALT  16  /  AlkPhos  81  03-08    LIVER FUNCTIONS - ( 08 Mar 2023 04:50 )  Alb: 3.6 g/dL / Pro: 6.5 g/dL / ALK PHOS: 81 U/L / ALT: 16 U/L / AST: 27 U/L / GGT: x           ABG - ( 08 Mar 2023 05:33 )  pH, Arterial: 7.34  pH, Blood: x     /  pCO2: 25    /  pO2: 173   / HCO3: 14    / Base Excess: -10.5 /  SaO2: 99.4                           INTERVAL HISTORY: HPI:  69 y/o right handed M with PMH of T2DM, HLD, JAGDISH, hyponatremia, tracheal stenosis s/p tracheostomy, and glioblastoma presents for cerebral angiogram with Avastin treatment.  Daughter states in January 2022, patient had onset of confusion, dizziness, and speech difficulty while in [place] and was diagnosed with brain mass. Pt evaluated upon return to the [place] at [facility] ED with CT head revealing left frontal brain mass. Pt underwent resection of left frontal enhancing tumor with GLEOLAN placed 1/27/2022. Path showed gliosarcoma, WHO grade IV, IDH wild-type, EGFR non amplified, MGMT promotor methylated. Pt was discharged to rehab and underwent radiation treatment and Temodar chemotherapy (completed 12/22). MRI 12/22 showed recurrence of brain mass. Pt presented 3/7 referred by Dr. Mackenzie for trial participation cerebral angiogram with Avastin treatment.    Pt complains of dizziness. Denies headache, nausea, vomiting, weakness, numbness, chest pain, dyspnea.   (07 Mar 2023 12:30)    24HOUR EVENTS:   Decline in mental status noted, pt globally aphasic. Stat CT head, CTA/CTP done.   Overnight pt developed severe metabolic lactic acidosis, pulmonary edema, hyponatremia and fluid overload.   Insulin ggt started, given lasix 40mg IV x1. Pt developed SVT resolved which resolved with 5mg metoprolol. Also given 1 amp bicarbonate followed by HCO3 ggt.   Emergent IJ TLC placed at bedside  Sedated with propofol. Added neosynephrine for shock state.     HOSP COURSE:   3/7: POD 0 s/p cerebral angiogram IA avastin. Pt hypertensive after being given neosynephrine. Developed flash pulmonary edema w/ desaturation, given diuretics.    3/8: POD1. Overnight, new global aphasia, right side only withdrawing to noxious stim.  CTH/CTA/CTP performed. No LVO. Severe metabolic lactic acidosis, pulmonary edema, hyponatremia and fluid overload. Started on bicarb gtt. Diuresced.   Evidence of DKA-> on insulin gtt.   POCUS this am; B lines L>R, severely hypokinetic left ventricle. Concern for cardiogenic shock. Cardiology consulted stat.     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  Type 2 diabetes mellitus  Hyperlipidemia  Iron deficiency anemia  Nephrolithiasis  Thrombocytopenia  Hyponatremia  BPH (benign prostatic hyperplasia)  S/P craniotomy  H/O tracheostomy    REVIEW OF SYSTEMS: [x] Unable to Assess due to neurologic exam   [ ] All ROS addressed below are non-contributory, except:  Neuro: [ ] Headache [ ] Back pain [ ] Numbness [ ] Weakness [ ] Ataxia [ ] Dizziness [ ] Aphasia [ ] Dysarthria [ ] Visual disturbance  Resp: [ ] Shortness of breath/dyspnea, [ ] Orthopnea [ ] Cough  CV: [ ] Chest pain [ ] Palpitation [ ] Lightheadedness [ ] Syncope  Renal: [ ] Thirst [ ] Edema  GI: [ ] Nausea [ ] Emesis [ ] Abdominal pain [ ] Constipation [ ] Diarrhea  Hem: [ ] Hematemesis [ ] bright red blood per rectum  ID: [ ] Fever [ ] Chills [ ] Dysuria  ENT: [ ] Rhinorrhea      ICU Vital Signs Last 24 Hrs  T(C): 38.2 (08 Mar 2023 05:27), Max: 38.2 (08 Mar 2023 05:27)  T(F): 100.7 (08 Mar 2023 05:27), Max: 100.7 (08 Mar 2023 05:27)  HR: 123 (08 Mar 2023 06:00) (88 - 141)  BP: 96/70 (08 Mar 2023 06:00) (95/69 - 152/98)  BP(mean): 79 (08 Mar 2023 06:00) (71 - 122)  ABP: 99/58 (08 Mar 2023 06:00) (90/51 - 165/87)  ABP(mean): 68 (08 Mar 2023 06:00) (63 - 112)  RR: 45 (08 Mar 2023 06:00) (14 - 45)  SpO2: 95% (08 Mar 2023 06:00) (84% - 100%)      03-07-23 @ 07:01  -  03-08-23 @ 06:43  --------------------------------------------------------  IN: 1793.9 mL / OUT: 1975 mL / NET: -181.1 mL      Mode: AC/ CMV (Assist Control/ Continuous Mandatory Ventilation), RR (machine): 12, TV (machine): 450, FiO2: 60, PEEP: 6, ITime: 1, MAP: 6.3, PIP: 12    PHYSICAL EXAM:  General: Sedated  Neurological: Sedated on propofol. Pupils 3mm and reactive, withdraws in all extremities  HEENT: +trach in place.   Pulmonary: Coarse breath sounds in all lung fields L>R  Cardiovascular: S1, S2, tachycardic   Gastrointestinal: Soft, nontender, nondistended   : Campuzano in place; dark urine  Extremities: No calf tenderness   Incision: CDI no hematoma, DP +w/ doppler     MEDICATIONS:   acetaminophen     Tablet .. 650 milliGRAM(s) Oral every 6 hours PRN  atorvastatin 20 milliGRAM(s) Oral at bedtime  Bevacizumab (AVASTIN) 1065 milliGRAM(s) 1065 milliGRAM(s) IntraArterial once  chlorhexidine 0.12% Liquid 15 milliLiter(s) Oral Mucosa every 12 hours  chlorhexidine 4% Liquid 1 Application(s) Topical once  dextrose 50% Injectable 50 milliLiter(s) IV Push every 15 minutes  dextrose 50% Injectable 25 milliLiter(s) IV Push every 15 minutes  fluticasone propionate 50 MICROgram(s)/spray Nasal Spray 1 Spray(s) Both Nostrils two times a day  glucagon  Injectable 1 milliGRAM(s) IntraMuscular once  insulin lispro (ADMELOG) corrective regimen sliding scale   SubCutaneous Before meals and at bedtime  insulin regular Infusion 3 Unit(s)/Hr (3 mL/Hr) IV Continuous <Continuous>  levETIRAcetam  IVPB 750 milliGRAM(s) IV Intermittent every 12 hours  Mannitol 20% 250 milliLiter(s) 250 milliLiter(s) IntraArterial once  ondansetron Injectable 4 milliGRAM(s) IV Push every 8 hours PRN  pantoprazole  Injectable 40 milliGRAM(s) IV Push daily  polyethylene glycol 3350 17 Gram(s) Oral every 12 hours PRN  propofol Infusion 10 MICROgram(s)/kG/Min (4.27 mL/Hr) IV Continuous <Continuous>  senna 2 Tablet(s) Oral at bedtime  sodium bicarbonate  Infusion 0.158 mEq/kG/Hr (75 mL/Hr) IV Continuous <Continuous>  sodium chloride 0.9% lock flush 10 milliLiter(s) IV Push every 1 hour PRN    LABS:                       12.9   12.16 )-----------( 133      ( 08 Mar 2023 03:00 )             39.1     03-08    125<L>  |  93<L>  |  13  ----------------------------<  285<H>  4.9   |  14<L>  |  0.95    Ca    8.7      08 Mar 2023 04:50  Phos  4.3     03-08  Mg     1.8     03-08    TPro  6.5  /  Alb  3.6  /  TBili  0.7  /  DBili  x   /  AST  27  /  ALT  16  /  AlkPhos  81  03-08    LIVER FUNCTIONS - ( 08 Mar 2023 04:50 )  Alb: 3.6 g/dL / Pro: 6.5 g/dL / ALK PHOS: 81 U/L / ALT: 16 U/L / AST: 27 U/L / GGT: x           ABG - ( 08 Mar 2023 05:33 )  pH, Arterial: 7.34  pH, Blood: x     /  pCO2: 25    /  pO2: 173   / HCO3: 14    / Base Excess: -10.5 /  SaO2: 99.4

## 2023-03-08 NOTE — PROGRESS NOTE ADULT - ASSESSMENT
Assessment: 68M HTN, DM, chronic hyponatremia, POD 0 for IA Avastin for GBM c/b hypertensive emergency resulting in flash pulmonary edema 2/2 anesthesia iatrogenic administration of neosynephrine     NEURO:  POD 1 for IA Avastin for GBM   -neuro check q  -seizure disorder c/w keppra 750mg BID; continue w/ vEEG   -pain management w/ tylenol   -PT/OT evaluation   Obtain MRI brain w/ w/o contrast on this admission  sedation w/ propofol ggt RASS goal 0 to -1   troponin elevation in setting of acute decompensated heart failure     PULMONARY:  trach to vent   full vent support -SBP trials as tolerated   -continue to monitor on pulse o2     CARDIOVASCULAR:  hypertensive emergency resulting in flash pulmonary edema & acute decompensated heart failure EF 15%  monitor on telemetry; sinus tach   flow-track monitoring   vitals q1  hypotension in setting of cardiogenic shock   -c/w levophed ggt wean off   MAP goal >65  ECG:  NSR, 1st degree AVB, 0.5mm- 1mm DAVID v1-v3, IVCD  TTE with severely reduced LV systolic function EF 15-20%, apex and mid segments are akinetic EF 15%    GASTROENTEROLOGY:  NPO   zofran for nausea   c/w PPI  Daily stool count, LBM prior to arrival    RENAL/:  hyponatremia low serum osm &  urine osm continue to monitor   -check BMP qd  -strict i/o's ; c/w gonzalez  -Na goal 135-145  metabolic acidosis resolved s/p bicarb ggt; now alkalosis will adjust vent settings as appropriate   lasix PRN given severe reduction in systolic heart function       ENDOCRINE:  Diabetes Mellitus  A1c- 5.8  Monitor FSG q6 hrs while NPO   s/p insulin ggt for DKA   HISS     HEME/ONC:  thrombocytopenia & anemia- likely in setting of chronic disease & chemo  DVT ppx: SQL  b/l SCDs  b/l LE screening dopplers identified  LE calf DVT; repeat in 7 days to evaluate for propagation     INFECTIOUS:   febrile today   -cultures sent; UA negative, f/u blood and sputum cultures  monitor off abx at this time      Patient is critically ill, requiring critical care services.     I have personally and independently provided [35] minutes of critical care services.  This excludes any time spent on separate procedures or teaching.

## 2023-03-08 NOTE — CONSULT NOTE ADULT - ASSESSMENT
67 yo M PMHx of T2DM, HLD, JAGDISH, hyponatremia, tracheal stenosis s/p tracheostomy, and glioblastoma s/p resection of the left frontal enhancing tumor admitted for cerebral angiogram with Avastin treatment, c/b stress cardiomyopathy 2/2 excess norepinephrine.    #Stress induced cardiomyopathy (Takotsubo cardiomyopathy)   Likely due to excess norepinephrine given relative good functional capacity, and normal TTE prior to the procedure.  No overt signs of cardiogenic shock   TTE 3/2021: Normal Bi-V function   TTE with severely reduced LV systolic function EF 15-20%, apex and mid segments are akinetic  ECG:  NSR, 1st degree AVB, 0.5mm- 1mm DAVID v1-v3, IVCD  Tele: Predominantly sinus tachycardia, period of prolonged SVT likely due to norepinephrine.   Troponin peaked 0.32, no need to trend further   - Supportive care, consider  - wean pressors as tolerated  - will initiate GDMT once off pressors   69 yo M PMHx of T2DM, HLD, JAGDISH, hyponatremia, tracheal stenosis s/p tracheostomy, and glioblastoma s/p resection of the left frontal enhancing tumor admitted for cerebral angiogram with Avastin treatment, c/b stress cardiomyopathy 2/2 excess norepinephrine.    #Stress induced cardiomyopathy (Takotsubo cardiomyopathy)   Likely due to excess norepinephrine given relative good functional capacity, and normal TTE prior to the procedure.  No overt signs of cardiogenic shock   TTE 3/2021: Normal Bi-V function   TTE with severely reduced LV systolic function EF 15-20%, apex and mid segments are akinetic  ECG:  NSR, 1st degree AVB, 0.5mm- 1mm DAVID v1-v3, IVCD  Tele: Predominantly sinus tachycardia, period of prolonged SVT likely due to norepinephrine.   Troponin peaked 0.32, no need to trend further   ProBNP 6K s/p 20mg VI lasix. Would give additional 40mg IV lasix at this time.   - Supportive care, consider  - wean pressors as tolerated  - will initiate GDMT once off pressors

## 2023-03-08 NOTE — CONSULT NOTE ADULT - ASSESSMENT
54M presented w/elevated BUN/Cr w/o known renal history. Nephrology consulted for metabolic     Assessment/Plan:   #non-oliguric EUGENIA   #hyponatremia, hypovolemic   UOP 3000cc/24h   UA w/protein and blood   uPCR pending   unclear baseline creatinine  etiology of EUGENIA includes:   ischemic ATN likely given hypotensive episodes   toxic ATN unlikely as no known offending agents (gentamicin, chemotherapeutics)   contrast induced nephropathy unlikely as no known contrast exposure   rhabdomyolysis unlikely given no hx of trauma though pending CKMB panel   glomerulonephritis likely as UA w/protein and blood   AIN unlikely as no hx of PPI, NSAIDs, allopurinol, or antibiotics   paraproteinemia pending serum immunoelectropheresis (SPEP, immunofixation, free light chains)   renal infarct unlikely as rare   no NSAID use   most likely pre-renal, will pursue advanced work-up if initial work-up negative     Recommend:   STAT bladder scan r/o obstruction   continue with IVF NS @100cc/h   add-on CPK/CKMB panel   urine protein-creatinine ratio   daily BMP + urine lytes (Na, Cr, Urea, Osm)   renal sono  strict I/Os   renal diet     Thank you for the opportunity to participate in the care of your patient. The nephrology service remains available to assist with any questions or concerns. Please feel free to reach us by paging the on-call nephrology fellow for urgent issues or as below.  Pt. Was informed of Negative  COVID test.  Pt was informed to:  1. Quarantine for 10 days from the first day of symptoms.  2. To be 24 hours free of a fever, with no fever reducing medications.  3. To have great improvement of sxs.        53 yo male w/ tracheal stenosis s/p tracheostomy, and glioblastoma presents for cerebral angiogram with Avastin treatment w/ cc by severe metabolic acidosis likely 2/2 cardiogenic shock, now improving on bicarb gtt.     Assessment/Plan:   #metabolic acidosis   2/2 lactic acidosis i/s/o cardiogenic shock. Less likely 2/2 DKA given beta-hydroxy not significantly elevated.   - can decrease bicarb gtt as pH improving   - BMP and lactate q6  - cardiology input for inotrope support if EF reduced   - strict I&O  - expect creatinine to rise given urine Na <20     #non-oliguric EUGENIA   0.7-->1.03   - expect further rise given urine Na   - strict I&O   - renal diet when appropriate       Thank you for the opportunity to participate in the care of your patient. The nephrology service remains available to assist with any questions or concerns. Please feel free to reach us by paging the on-call nephrology fellow for urgent issues or as below.

## 2023-03-08 NOTE — DIETITIAN INITIAL EVALUATION ADULT - ENTERAL
If team places enteral access and wish to initiate EN feeds, please consult RD team  >> Propofol (17.1 ml/hr- 451 kcal).

## 2023-03-08 NOTE — PROGRESS NOTE ADULT - SUBJECTIVE AND OBJECTIVE BOX
INTERVAL HISTORY: HPI:  67 y/o right handed male with pmhx of T2DM, HLD, JAGDISH, hyponatremia, tracheal stenosis s/p tracheostomy, and glioblastoma presents for cerebral angiogram with Avastin treatment.    Daughter states in January 2022, patient had onset of confusion, dizziness, and speech difficulty while in [place] and was diagnosed with brain mass. Pt evaluated upon return to the [place] at [facility] ED with CT head revealing left frontal brain mass. Pt underwent resection of left frontal enhancing tumor with GLEOLAN placed 1/27/2022. Path showed gliosarcoma, WHO grade IV, IDH wild-type, EGFR non amplified, MGMT promotor methylated. Pt was discharged to rehab and underwent radiation treatment and Temodar chemotherapy (completed 12/22). MRI 12/22 showed recurrence of brain mass and pt presents today referred by Dr. Mackenzie for trial participation cerebral angiogram with Avastin treatment.    Pt complains of dizziness. Denies headache, nausea, vomiting, weakness, numbness, chest pain, dyspnea.   (07 Mar 2023 12:30)      MEDICATIONS  (STANDING):  atorvastatin 20 milliGRAM(s) Oral at bedtime  Bevacizumab (AVASTIN) 1065 milliGRAM(s) 1065 milliGRAM(s) IntraArterial once  chlorhexidine 0.12% Liquid 15 milliLiter(s) Oral Mucosa every 12 hours  chlorhexidine 4% Liquid 1 Application(s) Topical once  fluticasone propionate 50 MICROgram(s)/spray Nasal Spray 1 Spray(s) Both Nostrils two times a day  glucagon  Injectable 1 milliGRAM(s) IntraMuscular once  insulin lispro (ADMELOG) corrective regimen sliding scale   SubCutaneous Before meals and at bedtime  levETIRAcetam  IVPB 750 milliGRAM(s) IV Intermittent every 12 hours  Mannitol 20% 250 milliLiter(s) 250 milliLiter(s) IntraArterial once  pantoprazole    Tablet 40 milliGRAM(s) Oral before breakfast  senna 2 Tablet(s) Oral at bedtime  sodium chloride 1 Gram(s) Oral two times a day  sodium chloride 0.9%. 1000 milliLiter(s) (75 mL/Hr) IV Continuous <Continuous>  sodium chloride 3%. 500 milliLiter(s) (50 mL/Hr) IV Continuous <Continuous>    MEDICATIONS  (PRN):  acetaminophen     Tablet .. 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  Type 2 diabetes mellitus  Hyperlipidemia  Iron deficiency anemia  Nephrolithiasis  Thrombocytopenia  Hyponatremia  BPH (benign prostatic hyperplasia)  S/P craniotomy  H/O tracheostomy    REVIEW OF SYSTEMS: [x] Unable to Assess due to neurologic exam   [ ] All ROS addressed below are non-contributory, except:  Neuro: [ ] Headache [ ] Back pain [ ] Numbness [ ] Weakness [ ] Ataxia [ ] Dizziness [ ] Aphasia [ ] Dysarthria [ ] Visual disturbance  Resp: [ ] Shortness of breath/dyspnea, [ ] Orthopnea [ ] Cough  CV: [ ] Chest pain [ ] Palpitation [ ] Lightheadedness [ ] Syncope  Renal: [ ] Thirst [ ] Edema  GI: [ ] Nausea [ ] Emesis [ ] Abdominal pain [ ] Constipation [ ] Diarrhea  Hem: [ ] Hematemesis [ ] bright red blood per rectum  ID: [ ] Fever [ ] Chills [ ] Dysuria  ENT: [ ] Rhinorrhea    PHYSICAL EXAM:    General: No Acute Distress,  Neurological: grimace to pain, eyes open spontaneously, tracks, pupils 3mm reactive, non-verbal, not following commands, LUE AG purposeful, LLE spontaneous purposeful,  trace RUE WD, RLE WD  Pulmonary: +trach  Cardiovascular: S1, S2, tachycardiac   Gastrointestinal: Soft, Nontender, Nondistended   Extremities: No calf tenderness   Incision: CDI no hematoma, DP +w/ doppler     ICU Vital Signs Last 24 Hrs  T(C): 37.5 (08 Mar 2023 23:00), Max: 38.6 (08 Mar 2023 16:00)  T(F): 99.5 (08 Mar 2023 23:00), Max: 101.5 (08 Mar 2023 16:00)  HR: 89 (08 Mar 2023 23:00) (85 - 141)  BP: 94/64 (08 Mar 2023 07:00) (94/64 - 152/98)  BP(mean): 76 (08 Mar 2023 07:00) (71 - 122)  ABP: 111/59 (08 Mar 2023 23:00) (81/49 - 162/86)  ABP(mean): 77 (08 Mar 2023 23:00) (60 - 108)  RR: 20 (08 Mar 2023 23:00) (16 - 45)  SpO2: 99% (08 Mar 2023 23:00) (84% - 100%)      03-07-23 @ 07:01  -  03-08-23 @ 07:00  --------------------------------------------------------  IN: 1800.2 mL / OUT: 2095 mL / NET: -294.8 mL    03-08-23 @ 07:01  -  03-08-23 @ 23:39  --------------------------------------------------------  IN: 1411.7 mL / OUT: 1655 mL / NET: -243.3 mL        Mode: AC/ CMV (Assist Control/ Continuous Mandatory Ventilation), RR (machine): 12, TV (machine): 400, FiO2: 50, PEEP: 5, ITime: 1, MAP: 8, PIP: 8    acetaminophen     Tablet .. 650 milliGRAM(s) Oral every 6 hours PRN  atorvastatin 20 milliGRAM(s) Oral at bedtime  chlorhexidine 0.12% Liquid 15 milliLiter(s) Oral Mucosa every 12 hours  chlorhexidine 2% Cloths 1 Application(s) Topical <User Schedule>  chlorhexidine 4% Liquid 1 Application(s) Topical once  enoxaparin Injectable 40 milliGRAM(s) SubCutaneous <User Schedule>  fluticasone propionate 50 MICROgram(s)/spray Nasal Spray 1 Spray(s) Both Nostrils two times a day  insulin regular  human corrective regimen sliding scale   SubCutaneous every 6 hours  levETIRAcetam  IVPB 750 milliGRAM(s) IV Intermittent every 12 hours  norepinephrine Infusion 0.01 MICROgram(s)/kG/Min (1.34 mL/Hr) IV Continuous <Continuous>  ondansetron Injectable 4 milliGRAM(s) IV Push every 8 hours PRN  pantoprazole  Injectable 40 milliGRAM(s) IV Push daily  polyethylene glycol 3350 17 Gram(s) Oral every 12 hours PRN  propofol Infusion 9.993 MICROgram(s)/kG/Min (4.27 mL/Hr) IV Continuous <Continuous>  senna 2 Tablet(s) Oral at bedtime  sodium chloride 0.9% lock flush 10 milliLiter(s) IV Push every 1 hour PRN      LABS:  Na: 129 (03-08 @ 18:43), 127 (03-08 @ 14:23), 130 (03-08 @ 07:39), 125 (03-08 @ 04:50), 122 (03-08 @ 03:00), 122 (03-08 @ 00:10), 122 (03-07 @ 18:00)  K: 4.1 (03-08 @ 18:43), 4.4 (03-08 @ 14:23), 4.5 (03-08 @ 07:39), 4.9 (03-08 @ 04:50), 4.6 (03-08 @ 03:00), 4.5 (03-08 @ 00:10), 5.0 (03-07 @ 18:00)  Cl: 90 (03-08 @ 18:43), 93 (03-08 @ 14:23), 97 (03-08 @ 07:39), 93 (03-08 @ 04:50), 90 (03-08 @ 03:00), 89 (03-08 @ 00:10), 87 (03-07 @ 18:00)  CO2: 25 (03-08 @ 18:43), 24 (03-08 @ 14:23), 21 (03-08 @ 07:39), 14 (03-08 @ 04:50), 8 (03-08 @ 03:00), 13 (03-08 @ 00:10), 21 (03-07 @ 18:00)  BUN: 14 (03-08 @ 18:43), 14 (03-08 @ 14:23), 13 (03-08 @ 07:39), 13 (03-08 @ 04:50), 13 (03-08 @ 03:00), 10 (03-08 @ 00:10), 9 (03-07 @ 18:00)  Cr: 0.91 (03-08 @ 18:43), 0.82 (03-08 @ 14:23), 0.90 (03-08 @ 07:39), 0.95 (03-08 @ 04:50), 1.03 (03-08 @ 03:00), 0.70 (03-08 @ 00:10), 0.81 (03-07 @ 18:00)  Glu: 172(03-08 @ 18:43), 203(03-08 @ 14:23), 124(03-08 @ 07:39), 285(03-08 @ 04:50), 477(03-08 @ 03:00), 323(03-08 @ 00:10), 204(03-07 @ 18:00)    Hgb: 12.9 (03-08 @ 03:00), 11.7 (03-07 @ 18:00)  Hct: 39.1 (03-08 @ 03:00), 33.6 (03-07 @ 18:00)  WBC: 12.16 (03-08 @ 03:00), 6.32 (03-07 @ 18:00)  Plt: 133 (03-08 @ 03:00), 104 (03-07 @ 18:00)    INR: 1.17 03-07-23 @ 18:00  PTT: 27.5 03-07-23 @ 18:00    LIVER FUNCTIONS - ( 08 Mar 2023 04:50 )  Alb: 3.6 g/dL / Pro: 6.5 g/dL / ALK PHOS: 81 U/L / ALT: 16 U/L / AST: 27 U/L / GGT: x           ABG - ( 08 Mar 2023 17:30 )  pH, Arterial: 7.51  pH, Blood: x     /  pCO2: 31    /  pO2: 140   / HCO3: 25    / Base Excess: 2.3   /  SaO2: 99.2

## 2023-03-08 NOTE — DIETITIAN INITIAL EVALUATION ADULT - OTHER INFO
69 y/o right handed male with pmhx of T2DM, HLD, JAGDISH, hyponatremia, tracheal stenosis s/p tracheostomy, and glioblastoma now s/p cerebral angiogram with Avastin treatment (3/7)    On assessment, pt resting in bed on full vent support. Vent; AC/VC. Tmax 100.7F. MAP 70. Ordered for propofol (17.1 ml/hr- 451 kcal). Currently remains NPO- No plan for EN feeds as of this am during IDRs. Pt without enteral access- team to consider placement. If team wishes to restart EN feeds once enteral access is place- please consult RD team. No n/v/d/c. No abd distention or discomfort noted. Skin surgical incision. No pain noted. Unable to assess UBW nor appetite PTA. NKFA. Education deferred at this time. Please see nutr recs below.

## 2023-03-08 NOTE — PATIENT PROFILE ADULT - LANGUAGE ASSISTANCE NEEDED
Patient's daughter is at the bedside (Addie Norwood)/No-Patient/Caregiver offered and refused free interpretation services.

## 2023-03-08 NOTE — DIETITIAN INITIAL EVALUATION ADULT - PERTINENT MEDS FT
MEDICATIONS  (STANDING):  atorvastatin 20 milliGRAM(s) Oral at bedtime  chlorhexidine 0.12% Liquid 15 milliLiter(s) Oral Mucosa every 12 hours  chlorhexidine 2% Cloths 1 Application(s) Topical <User Schedule>  chlorhexidine 4% Liquid 1 Application(s) Topical once  enoxaparin Injectable 40 milliGRAM(s) SubCutaneous <User Schedule>  fluticasone propionate 50 MICROgram(s)/spray Nasal Spray 1 Spray(s) Both Nostrils two times a day  glucagon  Injectable 1 milliGRAM(s) IntraMuscular once  levETIRAcetam  IVPB 750 milliGRAM(s) IV Intermittent every 12 hours  magnesium sulfate  IVPB 2 Gram(s) IV Intermittent once  pantoprazole  Injectable 40 milliGRAM(s) IV Push daily  phenylephrine    Infusion 1.7 MICROgram(s)/kG/Min (45.4 mL/Hr) IV Continuous <Continuous>  propofol Infusion 9.993 MICROgram(s)/kG/Min (4.27 mL/Hr) IV Continuous <Continuous>  senna 2 Tablet(s) Oral at bedtime  sodium bicarbonate  Infusion 0.158 mEq/kG/Hr (100 mL/Hr) IV Continuous <Continuous>    MEDICATIONS  (PRN):  acetaminophen     Tablet .. 650 milliGRAM(s) Oral every 6 hours PRN Mild Pain (1 - 3)  ondansetron Injectable 4 milliGRAM(s) IV Push every 8 hours PRN Nausea and/or Vomiting  polyethylene glycol 3350 17 Gram(s) Oral every 12 hours PRN Constipation  sodium chloride 0.9% lock flush 10 milliLiter(s) IV Push every 1 hour PRN Pre/post blood products, medications, blood draw, and to maintain line patency

## 2023-03-08 NOTE — DIETITIAN INITIAL EVALUATION ADULT - PERTINENT LABORATORY DATA
03-08    130<L>  |  97  |  13  ----------------------------<  124<H>  4.5   |  21<L>  |  0.90    Ca    8.9      08 Mar 2023 07:39  Phos  2.3     03-08  Mg     1.7     03-08    TPro  6.5  /  Alb  3.6  /  TBili  0.7  /  DBili  x   /  AST  27  /  ALT  16  /  AlkPhos  81  03-08  POCT Blood Glucose.: 109 mg/dL (03-08-23 @ 10:04)  A1C with Estimated Average Glucose Result: 5.8 % (03-08-23 @ 03:00)  A1C with Estimated Average Glucose Result: 5.9 % (03-07-23 @ 18:00)

## 2023-03-08 NOTE — CONSULT NOTE ADULT - SUBJECTIVE AND OBJECTIVE BOX
Toro Flores MD PANDA   Cardiology Fellow    Pager [phone]     Patient is a 68y old  Male who presents with a chief complaint of cerebral angiogram with Avastin (08 Mar 2023 14:11)    HPI:  69 y/o right handed male with pmhx of T2DM, HLD, JAGDISH, hyponatremia, tracheal stenosis s/p tracheostomy, and glioblastoma presents for cerebral angiogram with Avastin treatment.    Daughter states in 2022, patient had onset of confusion, dizziness, and speech difficulty while in [place] and was diagnosed with brain mass. Pt evaluated upon return to the  at [facility] ED with CT head revealing left frontal brain mass. Pt underwent resection of left frontal enhancing tumor with GLEOLAN placed 2022. Path showed gliosarcoma, WHO grade IV, IDH wild-type, EGFR non amplified, MGMT promotor methylated. Pt was discharged to rehab and underwent radiation treatment and Temodar chemotherapy (completed ). MRI  showed recurrence of brain mass and pt presents today referred by Dr. Mackenzie for trial participation cerebral angiogram with Avastin treatment.    Pt complains of dizziness. Denies headache, nausea, vomiting, weakness, numbness, chest pain, dyspnea.   (07 Mar 2023 12:30)    Cardiology consulted for concern for abnormal POCUS findings. Patient experienced anesthesia related issues surrounding medications, SBP went up to 190's, subsequently developed flash pulmonary edema, managed with diuretics and higher Peep. A non-contrast head CT was performed to evaluate tumor bed and r/o hemorrhage. Tele with SVT that progressed to sinus tachycardia. POCUS this AM with concern for reduced LV systolic function and apical hypokinesis.     REVIEW OF SYSTEMS:   12 pt ROS otherwise negative     PAST MEDICAL & SURGICAL HISTORY:  Hypertension  Glioblastoma  Grade 4 Gliosarcoma dx 2022  Type 2 diabetes mellitus  Hyperlipidemia  Iron deficiency anemia  Nephrolithiasis  Thrombocytopenia  Hyponatremia  BPH (benign prostatic hyperplasia)  S/P craniotomy  H/O tracheostomy    SOCIAL HISTORY:    Allergies  amoxicillin (Rash)    HOME MEDICATIONS:  acetaminophen 325 mg oral tablet: 2 tab(s) orally every 6 hours, As needed, Mild Pain (1 - 3), Moderate Pain (4 - 6) (07 Mar 2023 12:50)  aspirin 81 mg oral tablet: 1 tab(s) orally once a day (07 Mar 2023 12:50)  levETIRAcetam 750 mg oral tablet: 1 tab(s) orally 2 times a day (07 Mar 2023 12:50)  melatonin 3 mg oral tablet: 1 tab(s) orally once a day (at bedtime) (07 Mar 2023 12:50)  polyethylene glycol 3350 oral powder for reconstitution: 17 gram(s) orally every 12 hours, As Needed for constipation (if no BM x 2 days).  (07 Mar 2023 12:50)  senna oral tablet: 2 tab(s) orally once a day (at bedtime) (07 Mar 2023 12:50)    Vital Signs Last 24 Hrs  T(C): 37.9 (08 Mar 2023 14:06), Max: 38.2 (08 Mar 2023 05:27)  T(F): 100.2 (08 Mar 2023 14:06), Max: 100.7 (08 Mar 2023 05:27)  HR: 98 (08 Mar 2023 15:00) (88 - 141)  BP: 94/64 (08 Mar 2023 07:00) (94/64 - 152/98)  BP(mean): 76 (08 Mar 2023 07:00) (71 - 122)  RR: 21 (08 Mar 2023 15:00) (14 - 45)  SpO2: 99% (08 Mar 2023 15:00) (84% - 100%)    Parameters below as of 08 Mar 2023 15:00  Patient On (Oxygen Delivery Method): ventilator    O2 Concentration (%): 50  I&O's Summary    07 Mar 2023 07:01  -  08 Mar 2023 07:00  --------------------------------------------------------  IN: 1800.2 mL / OUT: 2095 mL / NET: -294.8 mL    08 Mar 2023 07:01  -  08 Mar 2023 15:14  --------------------------------------------------------  IN: 1003.7 mL / OUT: 460 mL / NET: 543.7 mL    PHYSICAL EXAM:  GENERAL: NAD, sedated   HEAD:  Atraumatic, Normocephalic  EYES: EOMI, conjunctiva and sclera clear  NECK: +s/p tracheal intubation   CHEST/LUNG: +ventilator associated breath sounds   HEART: Regular rate and rhythm; No murmurs  ABDOMEN: Soft, Nontender, Nondistended; Bowel sounds present  EXTREMITIES:  2+ Peripheral Pulses, No clubbing, cyanosis, or edema  PSYCH: unable to assess  NEUROLOGY: sedated   SKIN: No rashes or lesions    LABS                        12.9   12.16 )-----------( 133      ( 08 Mar 2023 03:00 )             39.1                         11.7   6.32  )-----------( 104      ( 07 Mar 2023 18:00 )             33.6     03-08    130<L>  |  97  |  13  ----------------------------<  124<H>  4.5   |  21<L>  |  0.90  03-08    125<L>  |  93<L>  |  13  ----------------------------<  285<H>  4.9   |  14<L>  |  0.95    Ca    8.9      08 Mar 2023 07:39  Ca    8.7      08 Mar 2023 04:50  Phos  2.3     03-08  Mg     1.7     03-08    TPro  6.5  /  Alb  3.6  /  TBili  0.7  /  DBili  x   /  AST  27  /  ALT  16  /  AlkPhos  81  03-08    CAPILLARY BLOOD GLUCOSE  POCT Blood Glucose.: 179 mg/dL (08 Mar 2023 14:42)  POCT Blood Glucose.: 166 mg/dL (08 Mar 2023 11:55)  POCT Blood Glucose.: 130 mg/dL (08 Mar 2023 10:53)  POCT Blood Glucose.: 109 mg/dL (08 Mar 2023 10:04)    PT/INR - ( 07 Mar 2023 18:00 )   PT: 13.9 sec;   INR: 1.17     PTT - ( 07 Mar 2023 18:00 )  PTT:27.5 sec   08 Mar 2023 08:58 Troponin 0.32 ng/mL /  U/L / CKMB x     / CKMB Units 5.3 ng/mL  ( 08 Mar 2023 14:23 ) pH: 7.49  /  pCO2: 32    /  pO2: 163   / HCO3: 24    / Base Excess: 1.7   /  SaO2: 98.9    ( 08 Mar 2023 09:12 ) pH: 7.45  /  pCO2: 33    /  pO2: 165   / HCO3: 23    / Base Excess: -0.4  /  SaO2: 99.4    ( 08 Mar 2023 06:51 ) pH: 7.38  /  pCO2: 27    /  pO2: 113   / HCO3: 16    / Base Excess: -7.6  /  SaO2: 98.8      23 @ 14:23 - VBG - pH: 7.42  | pCO2: 42    | pO2: 37    | Lactate:        23 @ 09:12 - VBG - pH: 7.37  | pCO2: 41    | pO2: 35    | Lactate:          Urinalysis Basic - ( 07 Mar 2023 18:06 )  Color: x / Appearance: x / S.010 / pH: x  Gluc: x / Ketone: x  / Bili: x / Urobili: x   Blood: x / Protein: x / Nitrite: x   Leuk Esterase: x / RBC: x / WBC x   Sq Epi: x / Non Sq Epi: x / Bacteria: x    MEDICATIONS  (STANDING):  atorvastatin 20 milliGRAM(s) Oral at bedtime  chlorhexidine 0.12% Liquid 15 milliLiter(s) Oral Mucosa every 12 hours  chlorhexidine 2% Cloths 1 Application(s) Topical <User Schedule>  chlorhexidine 4% Liquid 1 Application(s) Topical once  dextrose 5%. 1000 milliLiter(s) (50 mL/Hr) IV Continuous <Continuous>  dextrose 5%. 1000 milliLiter(s) (100 mL/Hr) IV Continuous <Continuous>  dextrose 50% Injectable 25 Gram(s) IV Push once  dextrose 50% Injectable 12.5 Gram(s) IV Push once  dextrose 50% Injectable 25 Gram(s) IV Push once  enoxaparin Injectable 40 milliGRAM(s) SubCutaneous <User Schedule>  fluticasone propionate 50 MICROgram(s)/spray Nasal Spray 1 Spray(s) Both Nostrils two times a day  glucagon  Injectable 1 milliGRAM(s) IntraMuscular once  insulin regular  human corrective regimen sliding scale   SubCutaneous every 6 hours  levETIRAcetam  IVPB 750 milliGRAM(s) IV Intermittent every 12 hours  pantoprazole  Injectable 40 milliGRAM(s) IV Push daily  phenylephrine    Infusion 1.7 MICROgram(s)/kG/Min (45.4 mL/Hr) IV Continuous <Continuous>  propofol Infusion 9.993 MICROgram(s)/kG/Min (4.27 mL/Hr) IV Continuous <Continuous>  senna 2 Tablet(s) Oral at bedtime  sodium bicarbonate  Infusion 0.158 mEq/kG/Hr (75 mL/Hr) IV Continuous <Continuous>    MEDICATIONS  (PRN):  acetaminophen     Tablet .. 650 milliGRAM(s) Oral every 6 hours PRN Mild Pain (1 - 3)  dextrose Oral Gel 15 Gram(s) Oral once PRN Blood Glucose LESS THAN 70 milliGRAM(s)/deciliter  ondansetron Injectable 4 milliGRAM(s) IV Push every 8 hours PRN Nausea and/or Vomiting  polyethylene glycol 3350 17 Gram(s) Oral every 12 hours PRN Constipation  sodium chloride 0.9% lock flush 10 milliLiter(s) IV Push every 1 hour PRN Pre/post blood products, medications, blood draw, and to maintain line patency    ECG:  Admission EKG: NSR, 1st degree AVB, IVCD, LAHB   Repeat ECG: NSR, 1st degree AVB, 1mm DAVID v1-v3, IVCD, LAHB    ECHO FINDINGS:      RADIOLOGY & ADDITIONAL STUDIES:

## 2023-03-08 NOTE — CHART NOTE - NSCHARTNOTEFT_GEN_A_CORE
Patient in DKA w/ metabolic acidosis, pulmonary edema, hyponatremia and fluid overload. lasix 40mg IV x1 administered. SVT resolved w/ 5mg metoprolol, retaining nearly 900cc urine Campuzano placed.  Severe metabolic acidosis received 1 amp hco3 will start HCO3 ggt. emergent IJ TLC placed at bedside. CXR to confirm placement. Sedate patient given large TV w/ propofol. vent adjustments w/ next abg draw. Patient in DKA w/ metabolic acidosis, pulmonary edema, hyponatremia and fluid overload. insulin ggt started. lasix 40mg IV x1 administered. SVT resolved w/ 5mg metoprolol, retaining nearly 900cc urine Campuzano placed.  Severe metabolic acidosis received 1 amp hco3 will start HCO3 ggt. emergent IJ TLC placed at bedside. CXR to confirm placement. Sedate patient given large TV w/ propofol. vent adjustments w/ next abg draw. Patient in DKA w/ metabolic acidosis, pulmonary edema, hyponatremia and fluid overload. insulin ggt started. lasix 40mg IV x1 administered. SVT resolved w/ 5mg metoprolol, retaining nearly 900cc urine Campuzano placed.  Severe metabolic acidosis received 1 amp hco3 will start HCO3 ggt. emergent IJ TLC placed at bedside. CXR to confirm placement. Sedate patient given large TV w/ propofol. vent adjustments w/ next abg draw.    additional 1hr critical care time provided

## 2023-03-09 NOTE — EEG REPORT - NS EEG TEXT BOX
[facility] Department of Neurology  Inpatient Continuous video-Electroencephalogram    Patient Name:	JORDAN CHAKRABORTY    :	1955  MRN:	[id number]    Study Start Date/Time:  3/8/2023, 10:08:13 AM  Study End Date/Time:    Referred by: Marjan Hendrix MD    Brief Clinical History:  JORDAN CHAKRABORTY is a 68 year old Male; study performed to investigate for seizures or markers of epilepsy.      Diagnosis Code:   R56.9 convulsions/seizure  The live video was: unmonitored.    Pertinent Medications:  LEV 750mg BID    Acquisition Details:  Electroencephalography was acquired using a minimum of 21 channels on an [employer] system v 9.3.1 with electrode placement according to the standard International 10-20 system following ACNS (American Clinical Neurophysiology Society) guidelines for Long-Term Video EEG monitoring.  Anterior temporal T1 and T2 electrodes were utilized whenever possible.   The [employer] automated spike & seizure detections were all reviewed in detail, in addition to extensive portions of raw EEG.      Day 1: 3/8/2023 @ 10:08:13 AM to next morning @ 07:00 am  Background:  continuous, with predominantly alpha/theta frequencies.  Symmetry:  near continuous irregular delta>that’s over left hemisphere, max in frontotemporal region   Posterior Dominant Rhythm:  8 Hz fragmented.  Organization: Rudimentary.  Voltage:  Normal (20+ uV)  Variability: Yes. 		Reactivity: Yes.  N2 sleep: Symmetric, synchronous spindles and K complexes.  Spontaneous Activity:  No epileptiform discharges.  Periodic/rhythmic activity:  None  Events:  No electrographic seizures or significant clinical events.  Provocations:  Hyperventilation and Photic stimulation: was not performed.    Daily Summary:    1)	Mild generalized   slowing suggestive of a Mild degree of diffuse or multifocal  dysfunction.  2)	Focal slowing over left hemisphere, more prominent in frontotemporal region suggestive of focal cerebral dysfunction  3)	Occasional sharply contoured waves over left frontotemporal region indicating underlying hyperexcitability.     Marilyn Guzman MD  Attending Neurologist, [facility]

## 2023-03-09 NOTE — PROGRESS NOTE ADULT - ASSESSMENT
67 yo M PMHx of T2DM, HLD, JAGDISH, hyponatremia, tracheal stenosis s/p tracheostomy, and glioblastoma s/p resection of the left frontal enhancing tumor admitted for cerebral angiogram with Avastin treatment, c/b stress cardiomyopathy 2/2 excess norepinephrine.    #Stress induced cardiomyopathy (Takotsubo cardiomyopathy)   Likely due to excess norepinephrine given relative good functional capacity, and normal TTE prior to the procedure.  TTE 3/2021: Normal Bi-V function  TTE with severely reduced LV systolic function EF 15-20%, apex and mid segments are akineticECG:  NSR, 1st degree AVB, 0.5mm- 1mm DAVID v1-v3, IVCD. Troponin peaked 0.32, no need to trend further   Tele: no overnight events  3/9/23 EKG shows significant improvement after diuresis  c/w diuresis as needed.   Wean pressors as tolerated  If EF does not improve, will need to rule out CAD once patient is clinically stable. Takotsubu is a diagnosis of exclusion  Start beta-blocker therapy once BP stabilizes     -Since takotsubo CM is diagnosis of exclusion , will need to r/o CAD if EF does not improve once clinical status improves  -  out 2 L with lasix 40 mg IV x 2 (last night and this am). With sig improvement in resp status and CXR  -Wean off levo per primary team. Plan to start BB pending BP off levo

## 2023-03-09 NOTE — PROGRESS NOTE ADULT - SUBJECTIVE AND OBJECTIVE BOX
HPI:  69 y/o right handed male with pmhx of T2DM, HLD, JAGDISH, hyponatremia, tracheal stenosis s/p tracheostomy, and glioblastoma presents for cerebral angiogram with Avastin treatment.    Daughter states in 2022, patient had onset of confusion, dizziness, and speech difficulty while in [place] and was diagnosed with brain mass. Pt evaluated upon return to the [place] at [facility] ED with CT head revealing left frontal brain mass. Pt underwent resection of left frontal enhancing tumor with GLEOLAN placed 2022. Path showed gliosarcoma, WHO grade IV, IDH wild-type, EGFR non amplified, MGMT promotor methylated. Pt was discharged to rehab and underwent radiation treatment and Temodar chemotherapy (completed ). MRI  showed recurrence of brain mass and pt presents today referred by Dr. Mackenzie for trial participation cerebral angiogram with Avastin treatment.    Pt complains of dizziness. Denies headache, nausea, vomiting, weakness, numbness, chest pain, dyspnea.   (07 Mar 2023 12:30)    OVERNIGHT EVENTS: ANA LILIA    Hospital Course:   3/7: POD0 cerebral angiogram IA avastin. post op in cath lab hypertensive, +flash pulmonary edema w/ desaturation, given diuretics. on cpap   3/8: POD1. o/n new global aphasia, right side w/d. CTH/CTA/CTP performed. ABG o/n with metabolic acidosis, lactate 7.9 and hyperglycemic 320s. started on insulin gtt, 1L NS bolus and NS@200cc/hr. desatting on cpap, placed on full vent support. propofol for sedation. in SVT, resolved with 5 lopresor. fluids stopped due to worsening CXR. repeat lactate 12, given 1amp bicarb then started on bicarb gtt. POCUS this am with hypokinetic left ventricle, pending echo. Cards/nephro/ endo consulted for further recs. Insulin gtt dc'd. VEEG monitoring. S/p 3%Na.  SQL started tonight. S/p 10 NPH after FS of 166. austin gtt. S/p 40 IV lasix. Troponin downtrending 0.32 to 0.18. EEG negative for seizures. Pancultured spiked 101.5. UA negative. Bicarb gtt dc'd. TTE EF15-20%, akinetic apex and midsegments suggestive of takotsubo  3/9: POD2. ANA LILIA o/n neuro stable. remains sedated on propofol, on levo gtt.    Vital Signs Last 24 Hrs  T(C): 37.4 (09 Mar 2023 00:00), Max: 38.6 (08 Mar 2023 16:00)  T(F): 99.3 (09 Mar 2023 00:00), Max: 101.5 (08 Mar 2023 16:00)  HR: 87 (09 Mar 2023 00:00) (85 - 141)  BP: 94/64 (08 Mar 2023 07:00) (94/64 - 152/98)  BP(mean): 76 (08 Mar 2023 07:00) (71 - 122)  RR: 19 (09 Mar 2023 00:00) (19 - 45)  SpO2: 98% (09 Mar 2023 00:00) (84% - 100%)    Parameters below as of 09 Mar 2023 00:00  Patient On (Oxygen Delivery Method): ventilator    O2 Concentration (%): 50    I&O's Summary    07 Mar 2023 07:01  -  08 Mar 2023 07:00  --------------------------------------------------------  IN: 1800.2 mL / OUT: 2095 mL / NET: -294.8 mL    08 Mar 2023 07:01  -  09 Mar 2023 00:19  --------------------------------------------------------  IN: 1425.5 mL / OUT: 1715 mL / NET: -289.5 mL        PHYSICAL EXAM:  GEN: laying in bed, NAD  NEURO: AOx0. intermittently tracks, OE spont, face symmetric. CNII-XII intact. PERRL, EOMI. LUE/LLE spontaneous/strong. RUE trace w/d, RLE brisk w/d.   CV: RRR +S1/S2  PULM: CTAB  GI: Abd soft, NT/ND  EXT: ext warm, dry, nontender. pulses 2+ b/l  WOUND: R groin site c/d/i    TUBES/LINES:  [x] Gonzalez  [] Lumbar Drain  [] Wound Drains  [] Others      DIET:  [x] NPO  [] Mechanical  [] Tube feeds    LABS:                        12.9   12.16 )-----------( 133      ( 08 Mar 2023 03:00 )             39.1     03-08    129<L>  |  90<L>  |  14  ----------------------------<  172<H>  4.1   |  25  |  0.91    Ca    8.5      08 Mar 2023 18:43  Phos  2.3     03-08  Mg     1.7     03-08    TPro  6.5  /  Alb  3.6  /  TBili  0.7  /  DBili  x   /  AST  27  /  ALT  16  /  AlkPhos  81  03-08    PT/INR - ( 07 Mar 2023 18:00 )   PT: 13.9 sec;   INR: 1.17          PTT - ( 07 Mar 2023 18:00 )  PTT:27.5 sec  Urinalysis Basic - ( 08 Mar 2023 17:08 )    Color: Yellow / Appearance: Clear / S.020 / pH: x  Gluc: x / Ketone: NEGATIVE  / Bili: Negative / Urobili: 1.0 E.U./dL   Blood: x / Protein: NEGATIVE mg/dL / Nitrite: NEGATIVE   Leuk Esterase: NEGATIVE / RBC: > 10 /HPF / WBC < 5 /HPF   Sq Epi: x / Non Sq Epi: 0-5 /HPF / Bacteria: Present /HPF      CARDIAC MARKERS ( 08 Mar 2023 18:43 )  x     / 0.16 ng/mL / x     / x     / x      CARDIAC MARKERS ( 08 Mar 2023 14:23 )  x     / 0.18 ng/mL / x     / x     / x      CARDIAC MARKERS ( 08 Mar 2023 08:58 )  x     / 0.32 ng/mL / 114 U/L / x     / 5.3 ng/mL      CAPILLARY BLOOD GLUCOSE      POCT Blood Glucose.: 161 mg/dL (08 Mar 2023 23:24)  POCT Blood Glucose.: 179 mg/dL (08 Mar 2023 16:31)  POCT Blood Glucose.: 179 mg/dL (08 Mar 2023 14:42)  POCT Blood Glucose.: 166 mg/dL (08 Mar 2023 11:55)  POCT Blood Glucose.: 130 mg/dL (08 Mar 2023 10:53)  POCT Blood Glucose.: 109 mg/dL (08 Mar 2023 10:04)  POCT Blood Glucose.: 115 mg/dL (08 Mar 2023 09:05)  POCT Blood Glucose.: 129 mg/dL (08 Mar 2023 08:20)  POCT Blood Glucose.: 194 mg/dL (08 Mar 2023 07:04)  POCT Blood Glucose.: 272 mg/dL (08 Mar 2023 06:13)  POCT Blood Glucose.: 295 mg/dL (08 Mar 2023 05:05)  POCT Blood Glucose.: 313 mg/dL (08 Mar 2023 04:00)  POCT Blood Glucose.: 379 mg/dL (08 Mar 2023 03:05)  POCT Blood Glucose.: 305 mg/dL (08 Mar 2023 01:57)  POCT Blood Glucose.: 313 mg/dL (08 Mar 2023 01:11)      Drug Levels: [] N/A    CSF Analysis: [] N/A      Allergies    amoxicillin (Rash)    Intolerances      MEDICATIONS:  Antibiotics:    Neuro:  acetaminophen     Tablet .. 650 milliGRAM(s) Oral every 6 hours PRN  levETIRAcetam  IVPB 750 milliGRAM(s) IV Intermittent every 12 hours  ondansetron Injectable 4 milliGRAM(s) IV Push every 8 hours PRN  propofol Infusion 9.993 MICROgram(s)/kG/Min IV Continuous <Continuous>    Anticoagulation:  enoxaparin Injectable 40 milliGRAM(s) SubCutaneous <User Schedule>    OTHER:  atorvastatin 20 milliGRAM(s) Oral at bedtime  chlorhexidine 0.12% Liquid 15 milliLiter(s) Oral Mucosa every 12 hours  chlorhexidine 2% Cloths 1 Application(s) Topical <User Schedule>  chlorhexidine 4% Liquid 1 Application(s) Topical once  fluticasone propionate 50 MICROgram(s)/spray Nasal Spray 1 Spray(s) Both Nostrils two times a day  insulin regular  human corrective regimen sliding scale   SubCutaneous every 6 hours  norepinephrine Infusion 0.01 MICROgram(s)/kG/Min IV Continuous <Continuous>  pantoprazole  Injectable 40 milliGRAM(s) IV Push daily  polyethylene glycol 3350 17 Gram(s) Oral every 12 hours PRN  senna 2 Tablet(s) Oral at bedtime    IVF:    CULTURES:    RADIOLOGY & ADDITIONAL TESTS:      ASSESSMENT:  69 y/o right handed male with pmhx of T2DM, HLD, JAGDISH, hyponatremia, tracheal stenosis s/p tracheostomy, and glioblastoma now s/p cerebral angiogram with Avastin treatment (3/7).       C71.9    Abnormal electrocardiogram [ECG] [EKG]    Allergy, unspecified, initial encounter    Anemia, unspecified    Benign prostatic hyperplasia without lower urinary tract symptoms    Calculus of kidney    Cough, unspecified    Depression, unspecified    ENCOUNTER FOR ATTENT    Encounter for general adult medical examination without abnormal findings    Encounter for immunization    Encounter for immunization safety counseling    Encounter for other preprocedural examination    Encounter for screening for malignant neoplasm of prostate    Encounter for screening for other metabolic disorders    Encounter for screening for other viral diseases    ESSENTIAL (PRIMARY)    Gastro-esophageal reflux disease without esophagitis    Generalized anxiety disorder    History of Glioblastoma    Hyperlipidemia, unspecified    Hypo-osmolality and hyponatremia    Hypotension, unspecified    Iron deficiency    Iron deficiency anemia, unspecified    LONG TERM (CURRENT)    LONG TERM (CURRENT)    LONG TERM (CURRENT)    Malignant neoplasm of brain, unspecified    Nausea with vomiting, unspecified    Other constipation    Other fatigue    Other injury of unspecified body region, initial encounter    OTHER SPECIFIED DISE    Personal history of malignant neoplasm, unspecified    Personal history of other diseases of the nervous system and sense organs    Personal history of other endocrine, nutritional and metabolic disease    Personal history of other mental and behavioral disorders    Personal history of transient ischemic attack (TIA), and cerebral infarction without residual deficits    POSTPROCEDURAL SUBGL    Pruritus, unspecified    Shortness of breath    Tachycardia, unspecified    Thrombocytopenia, unspecified    Unspecified abdominal pain    Unspecified visual disturbance    Urinary calculus, unspecified    Vitamin D deficiency, unspecified    No pertinent family history in first degree relatives    FH: diabetes mellitus (Father, Mother)    FH: hyperlipidemia (Father)    FH: hypertension (Father, Mother)    Handoff    No pertinent past medical history    Diabetes mellitus    Hypertension    Glioblastoma    Type 2 diabetes mellitus    Hyperlipidemia    Iron deficiency anemia    Nephrolithiasis    Thrombocytopenia    Hyponatremia    BPH (benign prostatic hyperplasia)    No pertinent past medical history    GBM (glioblastoma multiforme)    GBM (glioblastoma multiforme)    Acute respiratory failure with hypoxia    Tracheostomy status    Acute hyponatremia    Angiogram, carotid and cerebral arteries    No significant past surgical history    No significant past surgical history    S/P craniotomy    H/O tracheostomy    No significant past surgical history    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        PLAN:  Neuro   - Vitals/neuro q1   - post op CTH negative  - CTA/CTP/repeat CTH negative  - pain control   - cont home keppra 750 bid  - VEEG monioring  - propofol for sedation    Cards  - MAP 65  - levo gtt  - Monitor CO/CI/ SvO2  - cards following  - TTE EF 15-20%, akinetic apex and midsegments suggestive of takotsubo    Pulm  - full vent support 50/400/12/8  - pulm following    GI  - NPO  - bowel regimen  - BM 3/8  - cont home protonix    RENAL:   - hyponatremia  - +gonzalez  - trend lactate    HEME:   - trend h/h, PLTs  - SCD L side only, SQL   - LE dopplers + R IM calf DVT   - serial dopplers     ID:   - afebrile    ENDO:   - DKA, insulin gtt dc'd  - AM cortisol 3/9 8:00am    DISPO: ICU status, full code, dispo pending        Assessment:  Present when checked    []  GCS  E   V  M     Heart Failure: []Acute, [] acute on chronic , []chronic  Heart Failure:  [] Diastolic (HFpEF), [] Systolic (HFrEF), []Combined (HFpEF and HFrEF), [] RHF, [] Pulm HTN, [] Other    [] EUGENIA, [] ATN, [] AIN, [] other  [] CKD1, [] CKD2, [] CKD 3, [] CKD 4, [] CKD 5, []ESRD    Encephalopathy: [] Metabolic, [] Hepatic, [] toxic, [] Neurological, [] Other    Abnormal Nurtitional Status: [] malnurtition (see nutrition note), [ ]underweight: BMI < 19, [] morbid obesity: BMI >40, [] Cachexia    [] Sepsis  [] hypovolemic shock,[] cardiogenic shock, [] hemorrhagic shock, [] neuogenic shock  [] Acute Respiratory Failure  []Cerebral edema, [] Brain compression/ herniation,   [] Functional quadriplegia  [] Acute blood loss anemia

## 2023-03-09 NOTE — CHART NOTE - NSCHARTNOTEFT_GEN_A_CORE
POD2. ANA LILIA o/n neuro stable. remains sedated on propofol, on levo gtt. Propofol dc'd. s/p lasix 40 mg IV x 1. CXR L effusion improved. Started lantus 10u at bedtime per endocrine. Campuzano removed, f/u TOV.

## 2023-03-09 NOTE — PROGRESS NOTE ADULT - SUBJECTIVE AND OBJECTIVE BOX
PM EVENTS: no acute overnight events as of documentation time of this note.    ROS: negative except as mentioned above.    T(C): 37.5 (03-09-23 @ 22:04), Max: 38.1 (03-09-23 @ 16:57)  HR: 78 (03-09-23 @ 22:00) (78 - 111)  BP: 97/65 (03-09-23 @ 21:00) (97/65 - 97/65)  RR: 14 (03-09-23 @ 22:00) (10 - 23)  SpO2: 100% (03-09-23 @ 22:00) (97% - 100%)    Mode: CPAP with PS  FiO2: 40  PEEP: 5  PS: 5  MAP: 6.3  PIP: 10      I&O's Summary    08 Mar 2023 07:01  -  09 Mar 2023 07:00  --------------------------------------------------------  IN: 1663 mL / OUT: 2015 mL / NET: -352 mL    09 Mar 2023 07:01  -  09 Mar 2023 22:25  --------------------------------------------------------  IN: 274 mL / OUT: 1225 mL / NET: -951 mL        EXAM:     Skyalr Coma Scale: 14    General: normocephalic, laying in bed, trach to vent, in no distress  Neuro     MS: A/Ox3 (to 'hospital' and appropriate month), cooperative with examination, bradyphrenia, no neglect, comprehension intact,    CN: PERRL, EOMI and no ptosis bilaterally, face symmetric, hearing grossly intact    Mot: bulk normal, tone normal, power 4/5 in bilateral upper and 5/5 in bilateral lower ext    Sens: intact to noxious stimuli in bilateral upper and lower ext  HEENT: tracheostomy to ventilator  Chest: nonlabored respirations, no adventitious lung sounds bilaterally, heart regular rate/rhythm, present S1/S2, no murmurs or rubs  Abdomen: nondistended, soft and nontender without peritoneal signs, normoactive bowel sounds  Extremities: no clubbing, well-perfused, no edema      ABG - ( 09 Mar 2023 14:47 )  pH, Arterial: 7.49  pH, Blood: x     /  pCO2: 38    /  pO2: 187   / HCO3: 29    / Base Excess: 5.4   /  SaO2: 99.9                                    11.7   10.76 )-----------( 105      ( 09 Mar 2023 05:13 )             33.3     03-09    130<L>  |  91<L>  |  15  ----------------------------<  184<H>  3.8   |  27  |  0.77    Ca    8.4      09 Mar 2023 14:02  Phos  4.0     03-09  Mg     1.8     03-09    TPro  6.5  /  Alb  3.6  /  TBili  0.7  /  DBili  x   /  AST  27  /  ALT  16  /  AlkPhos  81  03-08      MEDICATIONS  (STANDING):  atorvastatin 20 milliGRAM(s) Oral at bedtime  chlorhexidine 0.12% Liquid 15 milliLiter(s) Oral Mucosa every 12 hours  chlorhexidine 2% Cloths 1 Application(s) Topical <User Schedule>  enoxaparin Injectable 40 milliGRAM(s) SubCutaneous <User Schedule>  fluticasone propionate 50 MICROgram(s)/spray Nasal Spray 1 Spray(s) Both Nostrils two times a day  insulin glargine Injectable (LANTUS) 10 Unit(s) SubCutaneous at bedtime  insulin regular  human corrective regimen sliding scale   SubCutaneous every 6 hours  levETIRAcetam  IVPB 750 milliGRAM(s) IV Intermittent every 12 hours  norepinephrine Infusion 0.05 MICROgram(s)/kG/Min (6.68 mL/Hr) IV Continuous <Continuous>  pantoprazole  Injectable 40 milliGRAM(s) IV Push daily  senna 2 Tablet(s) Oral at bedtime    MEDICATIONS  (PRN):  acetaminophen     Tablet .. 650 milliGRAM(s) Oral every 6 hours PRN Mild Pain (1 - 3)  ondansetron Injectable 4 milliGRAM(s) IV Push every 8 hours PRN Nausea and/or Vomiting  polyethylene glycol 3350 17 Gram(s) Oral every 12 hours PRN Constipation  sodium chloride 0.9% lock flush 10 milliLiter(s) IV Push every 1 hour PRN Pre/post blood products, medications, blood draw, and to maintain line patency      Please see the day's note documented by Dr. Hendrix for detailed ongoing assessment and plan.  Additions to plan are as follows:    - F/u PM BMP, continue pressure support, wean levo

## 2023-03-09 NOTE — PROGRESS NOTE ADULT - SUBJECTIVE AND OBJECTIVE BOX
##INCOMPLETE##    Subjective: Patient seen and examined at bedside, tolerating CPAP well, able to squeeze my hand on command. Lab abnormalities normalizing overnight upon discontinuation of bicarb gtt. Cr back to baseline.       VITALS  Vital Signs Last 24 Hrs  T(C): 37.5 (09 Mar 2023 09:22), Max: 38.6 (08 Mar 2023 16:00)  T(F): 99.5 (09 Mar 2023 09:22), Max: 101.5 (08 Mar 2023 16:00)  HR: 89 (09 Mar 2023 09:00) (85 - 122)  BP: --  BP(mean): --  RR: 20 (09 Mar 2023 09:00) (18 - 24)  SpO2: 98% (09 Mar 2023 09:00) (97% - 100%)    Parameters below as of 09 Mar 2023 09:00  Patient On (Oxygen Delivery Method): ventilator,CPAP    O2 Concentration (%): 50    CAPILLARY BLOOD GLUCOSE      POCT Blood Glucose.: 149 mg/dL (09 Mar 2023 06:39)  POCT Blood Glucose.: 161 mg/dL (08 Mar 2023 23:24)  POCT Blood Glucose.: 179 mg/dL (08 Mar 2023 16:31)  POCT Blood Glucose.: 179 mg/dL (08 Mar 2023 14:42)  POCT Blood Glucose.: 166 mg/dL (08 Mar 2023 11:55)  POCT Blood Glucose.: 130 mg/dL (08 Mar 2023 10:53)  POCT Blood Glucose.: 109 mg/dL (08 Mar 2023 10:04)    PHYSICAL EXAM:  GENERAL: NAD  HEENT: JUAN, EOMI, neck supple, no JVP. Trach in place, insertion site clean w/o erythema   CHEST/LUNG: anterior lung sounds w/ bl crackles, improved from yesterday. CPAP w/ RR 12   HEART: Regular rate and rhythm, no murmur, no gallops, no rub   ABDOMEN: Soft, nontender, non distended  : No flank or supra pubic tenderness. 50 cc dark yellow urine, gonzalez catheter draining well   EXTREMITIES: no pedal edema  Neurology: tracks with eyes, squeezes hand, on SBP    SKIN: No rash or skin lesion     MEDICATIONS  (STANDING):  atorvastatin 20 milliGRAM(s) Oral at bedtime  chlorhexidine 0.12% Liquid 15 milliLiter(s) Oral Mucosa every 12 hours  chlorhexidine 2% Cloths 1 Application(s) Topical <User Schedule>  enoxaparin Injectable 40 milliGRAM(s) SubCutaneous <User Schedule>  fluticasone propionate 50 MICROgram(s)/spray Nasal Spray 1 Spray(s) Both Nostrils two times a day  furosemide   Injectable 40 milliGRAM(s) IV Push once  insulin regular  human corrective regimen sliding scale   SubCutaneous every 6 hours  levETIRAcetam  IVPB 750 milliGRAM(s) IV Intermittent every 12 hours  norepinephrine Infusion 0.05 MICROgram(s)/kG/Min (6.68 mL/Hr) IV Continuous <Continuous>  pantoprazole  Injectable 40 milliGRAM(s) IV Push daily  senna 2 Tablet(s) Oral at bedtime    MEDICATIONS  (PRN):  acetaminophen     Tablet .. 650 milliGRAM(s) Oral every 6 hours PRN Mild Pain (1 - 3)  ondansetron Injectable 4 milliGRAM(s) IV Push every 8 hours PRN Nausea and/or Vomiting  polyethylene glycol 3350 17 Gram(s) Oral every 12 hours PRN Constipation  sodium chloride 0.9% lock flush 10 milliLiter(s) IV Push every 1 hour PRN Pre/post blood products, medications, blood draw, and to maintain line patency      amoxicillin (Rash)      LABS                        11.7   10.76 )-----------( 105      ( 09 Mar 2023 05:13 )             33.3     03-09    128<L>  |  92<L>  |  15  ----------------------------<  157<H>  4.1   |  27  |  0.70    Ca    8.8      09 Mar 2023 05:13  Phos  4.0     03-09  Mg     1.8     03-09    TPro  6.5  /  Alb  3.6  /  TBili  0.7  /  DBili  x   /  AST  27  /  ALT  16  /  AlkPhos  81  03-08    PT/INR - ( 07 Mar 2023 18:00 )   PT: 13.9 sec;   INR: 1.17          PTT - ( 07 Mar 2023 18:00 )  PTT:27.5 sec  Urinalysis Basic - ( 08 Mar 2023 17:08 )    Color: Yellow / Appearance: Clear / S.020 / pH: x  Gluc: x / Ketone: NEGATIVE  / Bili: Negative / Urobili: 1.0 E.U./dL   Blood: x / Protein: NEGATIVE mg/dL / Nitrite: NEGATIVE   Leuk Esterase: NEGATIVE / RBC: > 10 /HPF / WBC < 5 /HPF   Sq Epi: x / Non Sq Epi: 0-5 /HPF / Bacteria: Present /HPF      CARDIAC MARKERS ( 08 Mar 2023 23:55 )  x     / 0.13 ng/mL / x     / x     / x      CARDIAC MARKERS ( 08 Mar 2023 18:43 )  x     / 0.16 ng/mL / x     / x     / x      CARDIAC MARKERS ( 08 Mar 2023 14:23 )  x     / 0.18 ng/mL / x     / x     / x      CARDIAC MARKERS ( 08 Mar 2023 08:58 )  x     / 0.32 ng/mL / 114 U/L / x     / 5.3 ng/mL        Culture - Blood (collected 23 @ 17:20)  Source: .Blood Blood  Preliminary Report (23 @ 06:01):    No growth at 12 hours    Culture - Blood (collected 23 @ 17:20)  Source: .Blood Blood  Preliminary Report (23 @ 06:01):    No growth at 12 hours    Urinalysis with Rflx Culture (collected 23 @ 17:08)        IMAGING/EKG/ETC     Subjective: Patient seen and examined at bedside, tolerating CPAP well, able to squeeze my hand on command. Lab abnormalities normalizing overnight upon discontinuation of bicarb gtt. Cr back to baseline.       VITALS  Vital Signs Last 24 Hrs  T(C): 37.5 (09 Mar 2023 09:22), Max: 38.6 (08 Mar 2023 16:00)  T(F): 99.5 (09 Mar 2023 09:22), Max: 101.5 (08 Mar 2023 16:00)  HR: 89 (09 Mar 2023 09:00) (85 - 122)  BP: --  BP(mean): --  RR: 20 (09 Mar 2023 09:00) (18 - 24)  SpO2: 98% (09 Mar 2023 09:00) (97% - 100%)    Parameters below as of 09 Mar 2023 09:00  Patient On (Oxygen Delivery Method): ventilator,CPAP    O2 Concentration (%): 50    CAPILLARY BLOOD GLUCOSE      POCT Blood Glucose.: 149 mg/dL (09 Mar 2023 06:39)  POCT Blood Glucose.: 161 mg/dL (08 Mar 2023 23:24)  POCT Blood Glucose.: 179 mg/dL (08 Mar 2023 16:31)  POCT Blood Glucose.: 179 mg/dL (08 Mar 2023 14:42)  POCT Blood Glucose.: 166 mg/dL (08 Mar 2023 11:55)  POCT Blood Glucose.: 130 mg/dL (08 Mar 2023 10:53)  POCT Blood Glucose.: 109 mg/dL (08 Mar 2023 10:04)    PHYSICAL EXAM:  GENERAL: NAD  HEENT: JUAN, EOMI, neck supple, no JVP. Trach in place, insertion site clean w/o erythema   CHEST/LUNG: anterior lung sounds w/ bl crackles, improved from yesterday. CPAP w/ RR 12   HEART: Regular rate and rhythm, no murmur, no gallops, no rub   ABDOMEN: Soft, nontender, non distended  : No flank or supra pubic tenderness. 50 cc dark yellow urine, gonzalez catheter draining well   EXTREMITIES: no pedal edema  Neurology: tracks with eyes, squeezes hand, on SBP    SKIN: No rash or skin lesion     MEDICATIONS  (STANDING):  atorvastatin 20 milliGRAM(s) Oral at bedtime  chlorhexidine 0.12% Liquid 15 milliLiter(s) Oral Mucosa every 12 hours  chlorhexidine 2% Cloths 1 Application(s) Topical <User Schedule>  enoxaparin Injectable 40 milliGRAM(s) SubCutaneous <User Schedule>  fluticasone propionate 50 MICROgram(s)/spray Nasal Spray 1 Spray(s) Both Nostrils two times a day  furosemide   Injectable 40 milliGRAM(s) IV Push once  insulin regular  human corrective regimen sliding scale   SubCutaneous every 6 hours  levETIRAcetam  IVPB 750 milliGRAM(s) IV Intermittent every 12 hours  norepinephrine Infusion 0.05 MICROgram(s)/kG/Min (6.68 mL/Hr) IV Continuous <Continuous>  pantoprazole  Injectable 40 milliGRAM(s) IV Push daily  senna 2 Tablet(s) Oral at bedtime    MEDICATIONS  (PRN):  acetaminophen     Tablet .. 650 milliGRAM(s) Oral every 6 hours PRN Mild Pain (1 - 3)  ondansetron Injectable 4 milliGRAM(s) IV Push every 8 hours PRN Nausea and/or Vomiting  polyethylene glycol 3350 17 Gram(s) Oral every 12 hours PRN Constipation  sodium chloride 0.9% lock flush 10 milliLiter(s) IV Push every 1 hour PRN Pre/post blood products, medications, blood draw, and to maintain line patency      amoxicillin (Rash)      LABS                        11.7   10.76 )-----------( 105      ( 09 Mar 2023 05:13 )             33.3     03-09    128<L>  |  92<L>  |  15  ----------------------------<  157<H>  4.1   |  27  |  0.70    Ca    8.8      09 Mar 2023 05:13  Phos  4.0     03-09  Mg     1.8     03-09    TPro  6.5  /  Alb  3.6  /  TBili  0.7  /  DBili  x   /  AST  27  /  ALT  16  /  AlkPhos  81  03-08    PT/INR - ( 07 Mar 2023 18:00 )   PT: 13.9 sec;   INR: 1.17          PTT - ( 07 Mar 2023 18:00 )  PTT:27.5 sec  Urinalysis Basic - ( 08 Mar 2023 17:08 )    Color: Yellow / Appearance: Clear / S.020 / pH: x  Gluc: x / Ketone: NEGATIVE  / Bili: Negative / Urobili: 1.0 E.U./dL   Blood: x / Protein: NEGATIVE mg/dL / Nitrite: NEGATIVE   Leuk Esterase: NEGATIVE / RBC: > 10 /HPF / WBC < 5 /HPF   Sq Epi: x / Non Sq Epi: 0-5 /HPF / Bacteria: Present /HPF      CARDIAC MARKERS ( 08 Mar 2023 23:55 )  x     / 0.13 ng/mL / x     / x     / x      CARDIAC MARKERS ( 08 Mar 2023 18:43 )  x     / 0.16 ng/mL / x     / x     / x      CARDIAC MARKERS ( 08 Mar 2023 14:23 )  x     / 0.18 ng/mL / x     / x     / x      CARDIAC MARKERS ( 08 Mar 2023 08:58 )  x     / 0.32 ng/mL / 114 U/L / x     / 5.3 ng/mL        Culture - Blood (collected 23 @ 17:20)  Source: .Blood Blood  Preliminary Report (23 @ 06:01):    No growth at 12 hours    Culture - Blood (collected 23 @ 17:20)  Source: .Blood Blood  Preliminary Report (23 @ 06:01):    No growth at 12 hours    Urinalysis with Rflx Culture (collected 23 @ 17:08)        IMAGING/EKG/ETC

## 2023-03-09 NOTE — PROGRESS NOTE ADULT - ATTENDING COMMENTS
Initial attending contact date  3/8/23    . See fellow note written above for details. I reviewed the fellow documentation. I have personally seen and examined this patient. I reviewed vitals, labs, medications, cardiac studies, and additional imaging. I agree with the above fellow's findings and plans as written above with the following additions/statements.    67 y/o right handed M with PMH of T2DM, HLD, JAGDISH, hyponatremia, tracheal stenosis s/p tracheostomy, and glioblastoma s/p resection 1/2022 s/p radiation and chemo, admitted for cerebral angio with avastin tx due to recurrence of brain mass. Post op with HTN emergency leading to pulm edema  -Cardiology consulted for low EF noted on POCUS  -ECHO reviewed: severely depressed LVEF ~ 15-20% with akinetic/apical wall and basal inferior wall hyperkinesis. Mod TR with mild pulm HTN  -EKG post op NSR with new septal Qs and 1mm DAVID  -trop .32 now downtrending, BNP 6000  -Clinical picture along with EKG changes, mild trop elevation and ECHO findings all consistent with takotsubo CM  -Since takotsubo CM is diagnosis of exclusion , will need to r/o CAD if EF does not improve once clinical status improves  -  out 2 L with lasix 40 mg IV x 2 (last night and this am). With sig improvement in resp status and CXR  -Wean off levo per primary team. Plan to start BB pending BP off levo

## 2023-03-09 NOTE — PROGRESS NOTE ADULT - ASSESSMENT
Assessment: 67 y/o M HTN, DM, chronic hyponatremia, POD2  for IA Avastin for GBM.   Course complicated by cardiogenic shock/ cardiomyopathy- likely secondary to hypertensive emergency in setting of neosynephrine  Resultant pulmonary edema  Severe lactic acidosis  DKA  Hyponatremia    NEURO: Encephalopathy ?etiology  POD1 s/p IA Avastin for GBM   Neurochecks Q1   CT/ CTA head & neck w/ perfusion. No LVO or evidence of perfusion deficit  Seizure disorder: Continue Keppra 750mg BID  EEG-   Consider neurology consult if no improvement in mental status  Obtain MRI brain w/ w/o contrast when stable  Sedation: Propofol. RASS 0 to -1  Analgesia: Tylenol prn. Fentanyl prn.  Activity: Bedrest for now.     PULMONARY: Acute pulmonary edema. Likely secondary to cardiogenic shock  Trach to vent. Full vent support (12/450/60/8)  ABG, CXR  POCUS- see cardiovascular    CARDIOVASCULAR: Takotsubo cardiomopathy, shock, hypertensive emergency resulting in flash pulmonary edema  ECHO: 3/8: Severely depressed LVEF ~ 15-20% with akinetic/apical wall and basal inferior wall hyperkinesis. Mod TR with mild pulm HTN  EKG: NSR with new septal Qs and 1mm DAVID  Shock improving. Off vasopressors. Trend SVO2, lactate.   Goal MAP >/=65  Troponin improving.   Cardiology following; no inotrope indicated as shock state improving.   Lasix 40mg   POCUS daily    GASTROENTEROLOGY:  NGT- start TFs  GI ppx: Protonix 40mg qd  LBM prior to arrival- bowel regimen  LFTs wnl. Monitor     RENAL/: Profound hyponatremia; low serum osm &  urine osm, hyperlactatemia, severe metabolic acidosis  S/P 3%. Off. Na 122-> 129.    Na goal 125-130.   Continue Campuzano: strict Is/Os  S/P NaHCO3 gtt - discontinued  Nephrology following; appreciate recommendations    ENDOCRINE: Diabetes Mellitus with DKA s/p insulin drip  Start long acting insulin,  A1c- 5.8  TSH-  Cortisol-    HEME/ONC: Thrombocytopenia & anemia- likely in setting of chronic disease & chemotherapy. New DVT present on admission. RLE IM calf.   DCT ppx: Lovenox 40mg  LLE SCD  Obtain repeat dopplers on 3/13.   Monitor CBC    INFECTIOUS: Fever noted 3/8  Leukocytosis improving  Blood culture NGTD  UA  Obtain sputum culture  Procalcitonin    MISC:    SOCIAL/FAMILY:  [] awaiting [x] updated at bedside [] family meeting  Contacted patient's daughter Roxanne by phone this am and informed her of patient's critical condition and overnight events.   Presented at the bedside with other siblings    CODE STATUS:  [x] Full Code [] DNR [] DNI [] Palliative/Comfort Care    DISPOSITION:  [x] ICU [] Stroke Unit [] Floor [] EMU [] RCU [] PCU    Time spent: 65 critical care minutes       Assessment: 69 y/o M HTN, DM, chronic hyponatremia, POD2  for IA Avastin for GBM.   Course complicated by cardiogenic shock/ takotsubo cardiomyopathy- likely secondary to hypertensive emergency in setting of neosynephrine  Resultant pulmonary edema  Severe lactic acidosis  DKA  Hyponatremia    NEURO: POD2 s/p IA Avastin for GBM   Neurochecks Q1   CT/ CTA head & neck w/ perfusion. No LVO or evidence of perfusion deficit  Seizure disorder: Continue Keppra 750mg BID  EEG- negative. DC EEG 3/9  Consider neurology consult if no improvement in mental status  Obtain MRI brain w/ w/o contrast when stable  Sedation: None   Analgesia: Tylenol prn.  Activity: PT/OT.     PULMONARY: Acute pulmonary edema. Likely secondary to cardiogenic shock  Trach to vent. Full vent support (12/450/60/8)  ABG, CXR  POCUS- see cardiovascular    CARDIOVASCULAR: Takotsubo cardiomopathy, shock, hypertensive emergency resulting in flash pulmonary edema  ECHO: 3/8: Severely depressed LVEF ~ 15-20% with akinetic/apical wall and basal inferior wall hyperkinesis. Mod TR with mild pulm HTN  EKG: NSR with new septal Qs and 1mm DAVID  Shock improving. On low dose levophed.   Goal MAP >/=65.   Troponin improving  Cardiology following; no inotrope indicated as shock state improving.   Lasix 40mg   POCUS daily    GASTROENTEROLOGY:  NGT- Start TFs  GI ppx: Protonix 40mg qd  LBM prior to arrival- 3/9  LFTs wnl. Monitor     RENAL/: Profound hyponatremia; low serum osm &  urine osm, hyperlactatemia, severe metabolic acidosis  S/P 3%. DCed. Na 122-> 129.    Na goal 125-130. Monitor BMPs  Continue Campuzano: strict Is/Os-> [place] cath  S/P NaHCO3 gtt - discontinued  Nephrology following; appreciate recommendations    ENDOCRINE: Diabetes Mellitus with DKA s/p insulin drip  Start long acting insulin 8u  A1c- 5.8, TSH- 1.2. Cortisol- 16    HEME/ONC: Thrombocytopenia & anemia- likely in setting of chronic disease & chemotherapy. New DVT present on admission. RLE IM calf.   DCT ppx: Lovenox 40mg  LLE SCD. None on R  Obtain repeat dopplers on 3/13.   Monitor CBC    INFECTIOUS: Fever noted 3/8  Leukocytosis improving  Blood culture NGTD  UA neg  Obtain sputum culture  Procalcitonin 0.3    MISC:    SOCIAL/FAMILY:  [] awaiting [x] updated at bedside [] family meeting  Contacted patient's daughter Roxanne by phone this am and informed her of patient's critical condition and overnight events.   Presented at the bedside with other siblings    CODE STATUS:  [x] Full Code [] DNR [] DNI [] Palliative/Comfort Care    DISPOSITION:  [x] ICU [] Stroke Unit [] Floor [] EMU [] RCU [] PCU    Time spent: 65 critical care minutes

## 2023-03-09 NOTE — PROGRESS NOTE ADULT - ATTENDING COMMENTS
have reviewed status and marked improvement in biochemical status and in responsiveness noted, and discussed with family    agree with findings and recommendations.

## 2023-03-09 NOTE — PROGRESS NOTE ADULT - SUBJECTIVE AND OBJECTIVE BOX
SUBJECTIVE / INTERVAL HPI: Patient was seen and examined this morning. The patient was awake and alert today. He remains on mechanical ventilation via tracheostomy. Blood glucose have been mostly within target range. He didn't received his long-acting insulin last night.     CAPILLARY BLOOD GLUCOSE & INSULIN RECEIVED  166 mg/dL (03-08 @ 11:55) ? 10 units of NPH insulin.   179 mg/dL (03-08 @ 14:42) ? 2 units of regular insulin sliding scale.  161 mg/dL (03-08 @ 23:24) ? 2 units of regular insulin sliding scale.   149 mg/dL (03-09 @ 06:39) ? Ø  206 mg/dL (03-09 @ 13:13) ? 4 units of regular insulin sliding scale.     PHYSICAL EXAM  Vital Signs Last 24 Hrs  T(C): 37.4 (09 Mar 2023 13:52), Max: 38.6 (08 Mar 2023 16:00)  T(F): 99.3 (09 Mar 2023 13:52), Max: 101.5 (08 Mar 2023 16:00)  HR: 95 (09 Mar 2023 13:00) (85 - 106)  BP: --  BP(mean): --  RR: 15 (09 Mar 2023 13:00) (13 - 24)  SpO2: 98% (09 Mar 2023 13:00) (97% - 100%)    Parameters below as of 09 Mar 2023 13:00  Patient On (Oxygen Delivery Method): ventilator,CPAP    Constitutional: Awake, alert, on mechanical ventilation via tracheostomy, following commands.   HEENT: (+) Gauze dressing in place.   Neck: (+) Tracheostomy.   Respiratory: Lungs clear to ausculation bilaterally.   Cardiovascular: regular rhythm, normal S1 and S2, no audible murmurs.   GI: soft, non-distended, bowel sounds present.  Extremities: No lower extremity edema.    LABS  CBC - WBC/HGB/HTC/PLT: 10.76/11.7/33.3/105 (03-09-23)  BMP - Na/K/Cl/Bicarb/BUN/Cr/Gluc/AG/eGFR: 128/4.1/92/27/15/0.70/157/9/101 (03-09-23)  Ca - 8.8 (03-09-23)  Phos - 4.0 (03-09-23)  Mg - 1.8 (03-09-23)  LFT - Alb/Tprot/Tbili/Dbili/AlkPhos/ALT/AST: 3.6/--/0.7/--/81/16/27 (03-08-23)  PT/aPTT/INR: 13.9/27.5/1.17 (03-07-23)   Thyroid Stimulating Hormone, Serum: 1.210 (03-08-23)    MEDICATIONS  MEDICATIONS  (STANDING):  atorvastatin 20 milliGRAM(s) Oral at bedtime  chlorhexidine 0.12% Liquid 15 milliLiter(s) Oral Mucosa every 12 hours  chlorhexidine 2% Cloths 1 Application(s) Topical <User Schedule>  enoxaparin Injectable 40 milliGRAM(s) SubCutaneous <User Schedule>  fluticasone propionate 50 MICROgram(s)/spray Nasal Spray 1 Spray(s) Both Nostrils two times a day  insulin regular  human corrective regimen sliding scale   SubCutaneous every 6 hours  levETIRAcetam  IVPB 750 milliGRAM(s) IV Intermittent every 12 hours  norepinephrine Infusion 0.05 MICROgram(s)/kG/Min (6.68 mL/Hr) IV Continuous <Continuous>  pantoprazole  Injectable 40 milliGRAM(s) IV Push daily  senna 2 Tablet(s) Oral at bedtime    MEDICATIONS  (PRN):  acetaminophen     Tablet .. 650 milliGRAM(s) Oral every 6 hours PRN Mild Pain (1 - 3)  ondansetron Injectable 4 milliGRAM(s) IV Push every 8 hours PRN Nausea and/or Vomiting  polyethylene glycol 3350 17 Gram(s) Oral every 12 hours PRN Constipation  sodium chloride 0.9% lock flush 10 milliLiter(s) IV Push every 1 hour PRN Pre/post blood products, medications, blood draw, and to maintain line patency    ASSESSMENT / RECOMMENDATIONS  Mr. Norwood is a 68-year-old male with a past medical history of type 2 diabetes mellitus, hyperlipidemia, iron deficiency anemia, tracheal stenosis (s/p tracheostomy) and glioblastoma (found to have a mass in 1/2022, s/p resection of left frontal enhancing tumor with GLEOLAN placed on 1/27/22 with pathology showing gliosarcoma, WHO grade IV s/p radiation and chemotherapy completed on 12/2022, repeat MRI showing recurrence of brain mass on 12/2022) who presented for further management, now s/p cerebral angiogram with Avastin treatment (3/7/23). Post-operative course was complicated by flash pulmonary edema, desaturation, new global aphasia, right sided weakness, lactic acidosis and hyperglycemia. Endocrinology was consulted for recommendations regarding glycemic control.     A1C: 5.8 %  BUN: 15  Creatinine: 0.70  GFR: 101  Weight: 71.214  BMI: 24.6    # Type 2 diabetes mellitus  - The patient missed his dose of long-acting insulin last night; however, blood glucose didn't increase too much, reflecting that his basal requirements are likely lower than previously estimated.   - Please continue lantus *** units at bedtime.   - Continue lispro *** units before each meal.  - Continue lispro moderate / low dose sliding scale before meals and at bedtime.  - Patient's fingerstick glucose goal is 100-180 mg/dL.    - For discharge, patient can ***.    - Patient can follow up at discharge with [facility] by calling [phone] to make an appointment.      Case discussed with Dr. Hoang. Primary team updated.       Fabrice Madrid    Endocrinology Fellow    Service Pager: [phone]    SUBJECTIVE / INTERVAL HPI: Patient was seen and examined this morning. The patient was awake and alert today. He remains on mechanical ventilation via tracheostomy. Blood glucose have been mostly within target range. He didn't received his long-acting insulin last night. Plan is to start tube feeds tonight with Glucerna.     CAPILLARY BLOOD GLUCOSE & INSULIN RECEIVED  166 mg/dL (03-08 @ 11:55) ? 10 units of NPH insulin.   179 mg/dL (03-08 @ 14:42) ? 2 units of regular insulin sliding scale.  161 mg/dL (03-08 @ 23:24) ? 2 units of regular insulin sliding scale.   149 mg/dL (03-09 @ 06:39) ? Ø  206 mg/dL (03-09 @ 13:13) ? 4 units of regular insulin sliding scale.     PHYSICAL EXAM  Vital Signs Last 24 Hrs  T(C): 37.4 (09 Mar 2023 13:52), Max: 38.6 (08 Mar 2023 16:00)  T(F): 99.3 (09 Mar 2023 13:52), Max: 101.5 (08 Mar 2023 16:00)  HR: 95 (09 Mar 2023 13:00) (85 - 106)  BP: --  BP(mean): --  RR: 15 (09 Mar 2023 13:00) (13 - 24)  SpO2: 98% (09 Mar 2023 13:00) (97% - 100%)    Parameters below as of 09 Mar 2023 13:00  Patient On (Oxygen Delivery Method): ventilator,CPAP    Constitutional: Awake, alert, on mechanical ventilation via tracheostomy, following commands.   HEENT: (+) Gauze dressing in place.   Neck: (+) Tracheostomy.   Respiratory: Lungs clear to ausculation bilaterally.   Cardiovascular: regular rhythm, normal S1 and S2, no audible murmurs.   GI: soft, non-distended, bowel sounds present.  Extremities: No lower extremity edema.    LABS  CBC - WBC/HGB/HTC/PLT: 10.76/11.7/33.3/105 (03-09-23)  BMP - Na/K/Cl/Bicarb/BUN/Cr/Gluc/AG/eGFR: 128/4.1/92/27/15/0.70/157/9/101 (03-09-23)  Ca - 8.8 (03-09-23)  Phos - 4.0 (03-09-23)  Mg - 1.8 (03-09-23)  LFT - Alb/Tprot/Tbili/Dbili/AlkPhos/ALT/AST: 3.6/--/0.7/--/81/16/27 (03-08-23)  PT/aPTT/INR: 13.9/27.5/1.17 (03-07-23)   Thyroid Stimulating Hormone, Serum: 1.210 (03-08-23)    MEDICATIONS  MEDICATIONS  (STANDING):  atorvastatin 20 milliGRAM(s) Oral at bedtime  chlorhexidine 0.12% Liquid 15 milliLiter(s) Oral Mucosa every 12 hours  chlorhexidine 2% Cloths 1 Application(s) Topical <User Schedule>  enoxaparin Injectable 40 milliGRAM(s) SubCutaneous <User Schedule>  fluticasone propionate 50 MICROgram(s)/spray Nasal Spray 1 Spray(s) Both Nostrils two times a day  insulin regular  human corrective regimen sliding scale   SubCutaneous every 6 hours  levETIRAcetam  IVPB 750 milliGRAM(s) IV Intermittent every 12 hours  norepinephrine Infusion 0.05 MICROgram(s)/kG/Min (6.68 mL/Hr) IV Continuous <Continuous>  pantoprazole  Injectable 40 milliGRAM(s) IV Push daily  senna 2 Tablet(s) Oral at bedtime    MEDICATIONS  (PRN):  acetaminophen     Tablet .. 650 milliGRAM(s) Oral every 6 hours PRN Mild Pain (1 - 3)  ondansetron Injectable 4 milliGRAM(s) IV Push every 8 hours PRN Nausea and/or Vomiting  polyethylene glycol 3350 17 Gram(s) Oral every 12 hours PRN Constipation  sodium chloride 0.9% lock flush 10 milliLiter(s) IV Push every 1 hour PRN Pre/post blood products, medications, blood draw, and to maintain line patency    ASSESSMENT / RECOMMENDATIONS  Mr. Norwood is a 68-year-old male with a past medical history of type 2 diabetes mellitus, hyperlipidemia, iron deficiency anemia, tracheal stenosis (s/p tracheostomy) and glioblastoma (found to have a mass in 1/2022, s/p resection of left frontal enhancing tumor with GLEOLAN placed on 1/27/22 with pathology showing gliosarcoma, WHO grade IV s/p radiation and chemotherapy completed on 12/2022, repeat MRI showing recurrence of brain mass on 12/2022) who presented for further management, now s/p cerebral angiogram with Avastin treatment (3/7/23). Post-operative course was complicated by flash pulmonary edema, desaturation, new global aphasia, right sided weakness, lactic acidosis and hyperglycemia. Endocrinology was consulted for recommendations regarding glycemic control.     A1C: 5.8 %  BUN: 15  Creatinine: 0.70  GFR: 101  Weight: 71.214  BMI: 24.6    # Type 2 diabetes mellitus  - The patient missed his dose of long-acting insulin last night; however, blood glucose didn't increase too much, reflecting that his basal requirements are likely lower than previously estimated.   - Please continue with lantus 10 units at bedtime.   - Continue regular insulin moderate dose sliding scale every 6 hours.  - Patient's fingerstick glucose goal is 100-180 mg/dL.      Case discussed with Dr. Hoang. Primary team updated.       Fabrice Madrid    Endocrinology Fellow    Service Pager: [phone]

## 2023-03-09 NOTE — PROGRESS NOTE ADULT - SUBJECTIVE AND OBJECTIVE BOX
Interval Events: Reviewed  Patient seen and examined at bedside.    Patient is a 68y old  Male who presents with a chief complaint of cerebral angiogram with Avastin (09 Mar 2023 14:00)  more awake    PAST MEDICAL & SURGICAL HISTORY:  Hypertension      Glioblastoma  Grade 4 Gliosarcoma dx 2022      Type 2 diabetes mellitus      Hyperlipidemia      Iron deficiency anemia      Nephrolithiasis      Thrombocytopenia      Hyponatremia      BPH (benign prostatic hyperplasia)      S/P craniotomy      H/O tracheostomy          MEDICATIONS:  Pulmonary:    Antimicrobials:    Anticoagulants:  enoxaparin Injectable 40 milliGRAM(s) SubCutaneous <User Schedule>    Cardiac:  norepinephrine Infusion 0.05 MICROgram(s)/kG/Min IV Continuous <Continuous>      Allergies    amoxicillin (Rash)    Intolerances        Vital Signs Last 24 Hrs  T(C): 38.1 (09 Mar 2023 16:57), Max: 38.1 (09 Mar 2023 16:57)  T(F): 100.6 (09 Mar 2023 16:57), Max: 100.6 (09 Mar 2023 16:57)  HR: 96 (09 Mar 2023 18:00) (85 - 111)  BP: --  BP(mean): --  RR: 20 (09 Mar 2023 18:00) (10 - 23)  SpO2: 99% (09 Mar 2023 18:00) (97% - 100%)    Parameters below as of 09 Mar 2023 18:00  Patient On (Oxygen Delivery Method): ventilator,CPAP    O2 Concentration (%): 40     @ 07:  -   @ 07:00  --------------------------------------------------------  IN: 1663 mL / OUT:  mL / NET: -352 mL     @ 07:01  -   @ 18:45  --------------------------------------------------------  IN: 257.9 mL / OUT: 1225 mL / NET: -967.1 mL      Mode: CPAP with PS  FiO2: 40  PEEP: 5  PS: 5  MAP: 7.2  PIP: 10      Review of Systems:   •	General: negative  •	Skin/Breast: negative  •	Ophthalmologic: negative  •	ENMT: negative  •	Respiratory and Thorax: negative  •	Cardiovascular: negative  •	Gastrointestinal: negative  •	Genitourinary: negative  •	Musculoskeletal: negative  •	Neurological: negative  •	Psychiatric: negative  •	Hematology/Lymphatics: negative  •	Endocrine: negative  •	Allergic/Immunologic: negative    Physical Exam:   • Constitutional:	refer to the dietion /Nutritionist note  • Eyes:	EOMI; PERRL; no drainage or redness  • ENMT:	No oral lesions; no gross abnormalities  • Neck	No bruits; no thyromegaly or nodules  • Breasts:	not examined  • Back:	No deformity or limitation of movement  • Respiratory:	Breath Sounds equal & clear to percussion & auscultation, no accessory muscle use  • Cardiovascular:	Regular rate & rhythm, normal S1, S2; no murmurs, gallops or rubs; no S3, S4  • Gastrointestinal:	Soft, non-tender, no hepatosplenomegaly, normal bowel sounds  • Genitourinary:	not examined  • Rectal: not examined  • Extremities:	No cyanosis, clubbing or edema  • Vascular:	Equal and normal pulses (carotid, femoral, dorsalis pedis)  • Neurologica:l	not examined  • Skin:	No lesions; no rash  • Lymph Nodes:	No lymphadedenopathy  • Musculoskeletal:	No joint pain, swelling or deformity; no limitation of movement        LABS:  ABG - ( 09 Mar 2023 14:47 )  pH, Arterial: 7.49  pH, Blood: x     /  pCO2: 38    /  pO2: 187   / HCO3: 29    / Base Excess: 5.4   /  SaO2: 99.9                CBC Full  -  ( 09 Mar 2023 05:13 )  WBC Count : 10.76 K/uL  RBC Count : 3.61 M/uL  Hemoglobin : 11.7 g/dL  Hematocrit : 33.3 %  Platelet Count - Automated : 105 K/uL  Mean Cell Volume : 92.2 fl  Mean Cell Hemoglobin : 32.4 pg  Mean Cell Hemoglobin Concentration : 35.1 gm/dL  Auto Neutrophil # : x  Auto Lymphocyte # : x  Auto Monocyte # : x  Auto Eosinophil # : x  Auto Basophil # : x  Auto Neutrophil % : x  Auto Lymphocyte % : x  Auto Monocyte % : x  Auto Eosinophil % : x  Auto Basophil % : x    03-    130<L>  |  91<L>  |  15  ----------------------------<  184<H>  3.8   |  27  |  0.77    Ca    8.4      09 Mar 2023 14:02  Phos  4.0       Mg     1.8         TPro  6.5  /  Alb  3.6  /  TBili  0.7  /  DBili  x   /  AST  27  /  ALT  16  /  AlkPhos  81            Urinalysis Basic - ( 08 Mar 2023 17:08 )    Color: Yellow / Appearance: Clear / S.020 / pH: x  Gluc: x / Ketone: NEGATIVE  / Bili: Negative / Urobili: 1.0 E.U./dL   Blood: x / Protein: NEGATIVE mg/dL / Nitrite: NEGATIVE   Leuk Esterase: NEGATIVE / RBC: > 10 /HPF / WBC < 5 /HPF   Sq Epi: x / Non Sq Epi: 0-5 /HPF / Bacteria: Present /HPF    < from: Xray Chest 1 View- PORTABLE-Urgent (Xray Chest 1 View- PORTABLE-Urgent .) (23 @ 18:19) >    PROCEDURE DATE:  2023    ******PRELIMINARY REPORT******      ******PRELIMINARY REPORT******           INTERPRETATION:  XR CHEST URGENT dated 3/9/2023 6:19 PM    HISTORY: Nasogastric tube placement    COMPARISON: Chest radiograph from 3/9/2023 at 6:44 AM    FINDINGS: Interval placement of enteric catheter with distal tip   appropriately positioned in the left upper quadrant. No interval change   in additional support devices. Lung apices are not included in the   field-of-view. Continued improvement in left lung opacity. Grossly   unchanged right lower lung hazy opacity. There are no pleural effusions.   The cardiomediastinal silhouette is unremarkable. No acute osseous   abnormality.      IMPRESSION:    1.  Interval placement of appropriately positioned enteric catheter.  2.  Improvement in left lung opacity. Right lower lung opacity, grossly   unchanged.    < end of copied text >            Culture Results:   No growth at 1 day. ( @ 17:20)  Culture Results:   No growth at 1 day. ( @ 17:20)      RADIOLOGY & ADDITIONAL STUDIES (The following images were personally reviewed):

## 2023-03-09 NOTE — PROGRESS NOTE ADULT - ATTENDING COMMENTS
Pt seen on rounds this afternoon.  Was nearly fully alert today, and his strength on the right side is improving.  Still NPO, with feeds likely to be started.  Did not receive the Lantus last night, but AM fingerstick today was satisfactory at 149.  The rise to 206 at noon (without feeds having been started) is difficult to explain  His EF is extremely low at 15-20%.  He was not aware of any previous heart disease--?? related to one of the chemo agents.  (Will not be able to go back on metformin post discharge)  Lungs were essentially clear.  --to decrease the Lantus order for tonight to 10 units  --Will not start any standing nutritional insulin for now, with feeds to be started only at 10 cc/hr and increased slowly

## 2023-03-09 NOTE — PROGRESS NOTE ADULT - SUBJECTIVE AND OBJECTIVE BOX
Cardiology Consult Service Progress Note     Toro Flores MD PANDA  Cardiology Fellow   Pager [phone]    Patient is a 68y old  Male who presents with a chief complaint of Cerebral angiogram with Avastin (09 Mar 2023 07:11)    SUBJECTIVE/OVERNIGHT EVENTS   No acute events overnight.      OBJECTIVE  Vital Signs Last 24 Hrs  T(C): 37.5 (09 Mar 2023 08:00), Max: 38.6 (08 Mar 2023 16:00)  T(F): 99.5 (09 Mar 2023 08:00), Max: 101.5 (08 Mar 2023 16:00)  HR: 95 (09 Mar 2023 08:00) (85 - 122)  RR: 18 (09 Mar 2023 08:00) (18 - 26)  SpO2: 100% (09 Mar 2023 08:00) (97% - 100%)    Parameters below as of 09 Mar 2023 08:00  Patient On (Oxygen Delivery Method): ventilator    O2 Concentration (%): 50    I&O's Summary    08 Mar 2023 07:01  -  09 Mar 2023 07:00  --------------------------------------------------------  IN: 1663 mL / OUT: 2015 mL / NET: -352 mL    09 Mar 2023 07:01  -  09 Mar 2023 08:33  --------------------------------------------------------  IN: 11.2 mL / OUT: 40 mL / NET: -28.8 mL    PHYSICAL EXAM:  GENERAL: NAD, well-developed  HEAD:  Atraumatic, Normocephalic  EYES: EOMI, conjunctiva and sclera clear  NECK: Supple, No JVD  CHEST/LUNG: Clear to auscultation bilaterally; No wheeze  HEART: Regular rate and rhythm; No murmurs, rubs, or gallops  ABDOMEN: Soft, Nontender, Nondistended; Bowel sounds present  EXTREMITIES:  2+ Peripheral Pulses, No clubbing, cyanosis, or edema  PSYCH: AAOx3  NEUROLOGY: non-focal  SKIN: No rashes or lesions    LABS                        11.7   10.76 )-----------( 105      ( 09 Mar 2023 05:13 )             33.3                         12.9   12.16 )-----------( 133      ( 08 Mar 2023 03:00 )             39.1     03-09    128<L>  |  92<L>  |  15  ----------------------------<  157<H>  4.1   |  27  |  0.70  03-08    126<L>  |  90<L>  |  15  ----------------------------<  181<H>  3.9   |  26  |  0.77    Ca    8.8      09 Mar 2023 05:13  Ca    8.6      08 Mar 2023 23:55  Phos  4.0     03-09  Mg     1.8     03-09    TPro  6.5  /  Alb  3.6  /  TBili  0.7  /  DBili  x   /  AST  27  /  ALT  16  /  AlkPhos  81  03-08    CAPILLARY BLOOD GLUCOSE  POCT Blood Glucose.: 149 mg/dL (09 Mar 2023 06:39)  POCT Blood Glucose.: 161 mg/dL (08 Mar 2023 23:24)  POCT Blood Glucose.: 179 mg/dL (08 Mar 2023 16:31)  POCT Blood Glucose.: 179 mg/dL (08 Mar 2023 14:42)  POCT Blood Glucose.: 166 mg/dL (08 Mar 2023 11:55)  POCT Blood Glucose.: 130 mg/dL (08 Mar 2023 10:53)  POCT Blood Glucose.: 109 mg/dL (08 Mar 2023 10:04)  POCT Blood Glucose.: 115 mg/dL (08 Mar 2023 09:05)    PT/INR - ( 07 Mar 2023 18:00 )   PT: 13.9 sec;   INR: 1.17     PTT - ( 07 Mar 2023 18:00 )  PTT:27.5 sec   08 Mar 2023 23:55 Troponin 0.13 ng/mL / CK x     / CKMB x     / CKMB Units x       08 Mar 2023 18:43 Troponin 0.16 ng/mL / CK x     / CKMB x     / CKMB Units x       08 Mar 2023 14:23 Troponin 0.18 ng/mL / CK x     / CKMB x     / CKMB Units x        ( 09 Mar 2023 05:10 ) pH: 7.47  /  pCO2: 38    /  pO2: 204   / HCO3: 28    / Base Excess: 3.9   /  SaO2: 100.0     ( 08 Mar 2023 23:55 ) pH: 7.47  /  pCO2: 39    /  pO2: 173   / HCO3: 28    / Base Excess: 4.5   /  SaO2: 99.0      ( 08 Mar 2023 17:30 ) pH: 7.51  /  pCO2: 31    /  pO2: 140   / HCO3: 25    / Base Excess: 2.3   /  SaO2: 99.2      23 @ 18:43 - VBG - pH: 7.40  | pCO2: 46    | pO2: 40    | Lactate:        23 @ 14:23 - VBG - pH: 7.42  | pCO2: 42    | pO2: 37    | Lactate:          Urinalysis Basic - ( 08 Mar 2023 17:08 )  Color: Yellow / Appearance: Clear / S.020 / pH: x  Gluc: x / Ketone: NEGATIVE  / Bili: Negative / Urobili: 1.0 E.U./dL   Blood: x / Protein: NEGATIVE mg/dL / Nitrite: NEGATIVE   Leuk Esterase: NEGATIVE / RBC: > 10 /HPF / WBC < 5 /HPF   Sq Epi: x / Non Sq Epi: 0-5 /HPF / Bacteria: Present /HPF    MEDICATIONS  (STANDING):  atorvastatin 20 milliGRAM(s) Oral at bedtime  chlorhexidine 0.12% Liquid 15 milliLiter(s) Oral Mucosa every 12 hours  chlorhexidine 2% Cloths 1 Application(s) Topical <User Schedule>  chlorhexidine 4% Liquid 1 Application(s) Topical once  enoxaparin Injectable 40 milliGRAM(s) SubCutaneous <User Schedule>  fluticasone propionate 50 MICROgram(s)/spray Nasal Spray 1 Spray(s) Both Nostrils two times a day  insulin regular  human corrective regimen sliding scale   SubCutaneous every 6 hours  levETIRAcetam  IVPB 750 milliGRAM(s) IV Intermittent every 12 hours  pantoprazole  Injectable 40 milliGRAM(s) IV Push daily  propofol Infusion 9.993 MICROgram(s)/kG/Min (4.27 mL/Hr) IV Continuous <Continuous>  senna 2 Tablet(s) Oral at bedtime    MEDICATIONS  (PRN):  acetaminophen     Tablet .. 650 milliGRAM(s) Oral every 6 hours PRN Mild Pain (1 - 3)  ondansetron Injectable 4 milliGRAM(s) IV Push every 8 hours PRN Nausea and/or Vomiting  polyethylene glycol 3350 17 Gram(s) Oral every 12 hours PRN Constipation  sodium chloride 0.9% lock flush 10 milliLiter(s) IV Push every 1 hour PRN Pre/post blood products, medications, blood draw, and to maintain line patency Cardiology Consult Service Progress Note     Toro Flores MD PANDA  Cardiology Fellow   Pager [phone]    Patient is a 68y old  Male who presents with a chief complaint of Cerebral angiogram with Avastin (09 Mar 2023 07:11). Hospital course complicated by pulmonary edema, lactic acidosis, DKA, and stress-induced cardiomyopathy    SUBJECTIVE/OVERNIGHT EVENTS   febrile overnight    Patient seen and examined at bedside. awake, alert, follows commands. Pressor requirements increased however improvement in clinical status.     OBJECTIVE  Vital Signs Last 24 Hrs  T(C): 37.5 (09 Mar 2023 08:00), Max: 38.6 (08 Mar 2023 16:00)  T(F): 99.5 (09 Mar 2023 08:00), Max: 101.5 (08 Mar 2023 16:00)  HR: 95 (09 Mar 2023 08:00) (85 - 122)  RR: 18 (09 Mar 2023 08:00) (18 - 26)  SpO2: 100% (09 Mar 2023 08:00) (97% - 100%)    Parameters below as of 09 Mar 2023 08:00  Patient On (Oxygen Delivery Method): ventilator    O2 Concentration (%): 50    I&O's Summary    08 Mar 2023 07:01  -  09 Mar 2023 07:00  --------------------------------------------------------  IN: 1663 mL / OUT: 2015 mL / NET: -352 mL    09 Mar 2023 07:01  -  09 Mar 2023 08:33  --------------------------------------------------------  IN: 11.2 mL / OUT: 40 mL / NET: -28.8 mL    PHYSICAL EXAM:  GENERAL: NAD, well-developed  HEAD:  Atraumatic, Normocephalic  EYES: EOMI, conjunctiva and sclera clear  NECK: Supple, No JVD  CHEST/LUNG: Clear to auscultation bilaterally; No wheeze  HEART: Regular rate and rhythm; No murmurs, rubs, or gallops  ABDOMEN: Soft, Nontender, Nondistended; Bowel sounds present  EXTREMITIES:  2+ Peripheral Pulses, No clubbing, cyanosis, or edema  PSYCH: AAOx3  NEUROLOGY: non-focal  SKIN: No rashes or lesions    LABS                        11.7   10.76 )-----------( 105      ( 09 Mar 2023 05:13 )             33.3                         12.9   12.16 )-----------( 133      ( 08 Mar 2023 03:00 )             39.1     03-09    128<L>  |  92<L>  |  15  ----------------------------<  157<H>  4.1   |  27  |  0.70  03-08    126<L>  |  90<L>  |  15  ----------------------------<  181<H>  3.9   |  26  |  0.77    Ca    8.8      09 Mar 2023 05:13  Ca    8.6      08 Mar 2023 23:55  Phos  4.0     03-09  Mg     1.8     03-09    TPro  6.5  /  Alb  3.6  /  TBili  0.7  /  DBili  x   /  AST  27  /  ALT  16  /  AlkPhos  81  03-08    CAPILLARY BLOOD GLUCOSE  POCT Blood Glucose.: 149 mg/dL (09 Mar 2023 06:39)  POCT Blood Glucose.: 161 mg/dL (08 Mar 2023 23:24)  POCT Blood Glucose.: 179 mg/dL (08 Mar 2023 16:31)  POCT Blood Glucose.: 179 mg/dL (08 Mar 2023 14:42)  POCT Blood Glucose.: 166 mg/dL (08 Mar 2023 11:55)  POCT Blood Glucose.: 130 mg/dL (08 Mar 2023 10:53)  POCT Blood Glucose.: 109 mg/dL (08 Mar 2023 10:04)  POCT Blood Glucose.: 115 mg/dL (08 Mar 2023 09:05)    PT/INR - ( 07 Mar 2023 18:00 )   PT: 13.9 sec;   INR: 1.17     PTT - ( 07 Mar 2023 18:00 )  PTT:27.5 sec   08 Mar 2023 23:55 Troponin 0.13 ng/mL / CK x     / CKMB x     / CKMB Units x       08 Mar 2023 18:43 Troponin 0.16 ng/mL / CK x     / CKMB x     / CKMB Units x       08 Mar 2023 14:23 Troponin 0.18 ng/mL / CK x     / CKMB x     / CKMB Units x        ( 09 Mar 2023 05:10 ) pH: 7.47  /  pCO2: 38    /  pO2: 204   / HCO3: 28    / Base Excess: 3.9   /  SaO2: 100.0     ( 08 Mar 2023 23:55 ) pH: 7.47  /  pCO2: 39    /  pO2: 173   / HCO3: 28    / Base Excess: 4.5   /  SaO2: 99.0      ( 08 Mar 2023 17:30 ) pH: 7.51  /  pCO2: 31    /  pO2: 140   / HCO3: 25    / Base Excess: 2.3   /  SaO2: 99.2      23 @ 18:43 - VBG - pH: 7.40  | pCO2: 46    | pO2: 40    | Lactate:        23 @ 14:23 - VBG - pH: 7.42  | pCO2: 42    | pO2: 37    | Lactate:          Urinalysis Basic - ( 08 Mar 2023 17:08 )  Color: Yellow / Appearance: Clear / S.020 / pH: x  Gluc: x / Ketone: NEGATIVE  / Bili: Negative / Urobili: 1.0 E.U./dL   Blood: x / Protein: NEGATIVE mg/dL / Nitrite: NEGATIVE   Leuk Esterase: NEGATIVE / RBC: > 10 /HPF / WBC < 5 /HPF   Sq Epi: x / Non Sq Epi: 0-5 /HPF / Bacteria: Present /HPF    MEDICATIONS  (STANDING):  atorvastatin 20 milliGRAM(s) Oral at bedtime  chlorhexidine 0.12% Liquid 15 milliLiter(s) Oral Mucosa every 12 hours  chlorhexidine 2% Cloths 1 Application(s) Topical <User Schedule>  chlorhexidine 4% Liquid 1 Application(s) Topical once  enoxaparin Injectable 40 milliGRAM(s) SubCutaneous <User Schedule>  fluticasone propionate 50 MICROgram(s)/spray Nasal Spray 1 Spray(s) Both Nostrils two times a day  insulin regular  human corrective regimen sliding scale   SubCutaneous every 6 hours  levETIRAcetam  IVPB 750 milliGRAM(s) IV Intermittent every 12 hours  pantoprazole  Injectable 40 milliGRAM(s) IV Push daily  propofol Infusion 9.993 MICROgram(s)/kG/Min (4.27 mL/Hr) IV Continuous <Continuous>  senna 2 Tablet(s) Oral at bedtime    MEDICATIONS  (PRN):  acetaminophen     Tablet .. 650 milliGRAM(s) Oral every 6 hours PRN Mild Pain (1 - 3)  ondansetron Injectable 4 milliGRAM(s) IV Push every 8 hours PRN Nausea and/or Vomiting  polyethylene glycol 3350 17 Gram(s) Oral every 12 hours PRN Constipation  sodium chloride 0.9% lock flush 10 milliLiter(s) IV Push every 1 hour PRN Pre/post blood products, medications, blood draw, and to maintain line patency

## 2023-03-09 NOTE — PROGRESS NOTE ADULT - SUBJECTIVE AND OBJECTIVE BOX
INTERVAL HISTORY: HPI:  67 y/o right handed M with PMH of T2DM, HLD, JAGDISH, hyponatremia, tracheal stenosis s/p tracheostomy, and glioblastoma presents for cerebral angiogram with Avastin treatment.  Daughter states in January 2022, patient had onset of confusion, dizziness, and speech difficulty while in [place] and was diagnosed with brain mass. Pt evaluated upon return to the [place] at [facility] ED with CT head revealing left frontal brain mass. Pt underwent resection of left frontal enhancing tumor with GLEOLAN placed 1/27/2022. Path showed gliosarcoma, WHO grade IV, IDH wild-type, EGFR non amplified, MGMT promotor methylated. Pt was discharged to rehab and underwent radiation treatment and Temodar chemotherapy (completed 12/22). MRI 12/22 showed recurrence of brain mass. Pt presented 3/7 referred by Dr. Mackenzie for trial participation cerebral angiogram with Avastin treatment.    Pt complains of dizziness. Denies headache, nausea, vomiting, weakness, numbness, chest pain, dyspnea.   (07 Mar 2023 12:30)    24HOUR EVENTS:   Decline in mental status noted, pt globally aphasic. Stat CT head, CTA/CTP done.   Overnight pt developed severe metabolic lactic acidosis, pulmonary edema, hyponatremia and fluid overload.   Insulin ggt started, given lasix 40mg IV x1. Pt developed SVT resolved which resolved with 5mg metoprolol. Also given 1 amp bicarbonate followed by HCO3 ggt.   Emergent IJ TLC placed at bedside  Sedated with propofol. Added neosynephrine for shock state.     HOSP COURSE:   3/7: POD 0 s/p cerebral angiogram IA avastin. Pt hypertensive after being given neosynephrine. Developed flash pulmonary edema w/ desaturation, given diuretics.    3/8: POD1. Overnight, new global aphasia, right side only withdrawing to noxious stim.  CTH/CTA/CTP performed. No LVO. Severe metabolic lactic acidosis, pulmonary edema, hyponatremia and fluid overload. Started on bicarb gtt. Diuresced.   Evidence of DKA-> on insulin gtt.   POCUS this am; B lines L>R, severely hypokinetic left ventricle. Concern for cardiogenic shock. Cardiology consulted stat.   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  Type 2 diabetes mellitus  Hyperlipidemia  Iron deficiency anemia  Nephrolithiasis  Thrombocytopenia  Hyponatremia  BPH (benign prostatic hyperplasia)  S/P craniotomy  H/O tracheostomy    REVIEW OF SYSTEMS: [x] Unable to Assess due to neurologic exam   [ ] All ROS addressed below are non-contributory, except:  Neuro: [ ] Headache [ ] Back pain [ ] Numbness [ ] Weakness [ ] Ataxia [ ] Dizziness [ ] Aphasia [ ] Dysarthria [ ] Visual disturbance  Resp: [ ] Shortness of breath/dyspnea, [ ] Orthopnea [ ] Cough  CV: [ ] Chest pain [ ] Palpitation [ ] Lightheadedness [ ] Syncope  Renal: [ ] Thirst [ ] Edema  GI: [ ] Nausea [ ] Emesis [ ] Abdominal pain [ ] Constipation [ ] Diarrhea  Hem: [ ] Hematemesis [ ] bright red blood per rectum  ID: [ ] Fever [ ] Chills [ ] Dysuria  ENT: [ ] Rhinorrhea      ICU Vital Signs Last 24 Hrs  T(C): 37.6 (09 Mar 2023 07:00), Max: 38.6 (08 Mar 2023 16:00)  T(F): 99.7 (09 Mar 2023 07:00), Max: 101.5 (08 Mar 2023 16:00)  HR: 87 (09 Mar 2023 07:00) (85 - 122)  ABP: 99/53 (09 Mar 2023 07:00) (81/49 - 118/73)  ABP(mean): 69 (09 Mar 2023 07:00) (60 - 88)  RR: 19 (09 Mar 2023 07:00) (18 - 28)  SpO2: 99% (09 Mar 2023 07:00) (97% - 99%)      03-08-23 @ 07:01  -  03-09-23 @ 07:00  --------------------------------------------------------  IN: 1663 mL / OUT: 2015 mL / NET: -352 mL    03-09-23 @ 07:01  -  03-09-23 @ 07:12  --------------------------------------------------------  IN: 12.5 mL / OUT: 0 mL / NET: 12.5 mL      Mode: AC/ CMV (Assist Control/ Continuous Mandatory Ventilation), RR (machine): 12, TV (machine): 400, FiO2: 50, PEEP: 8, ITime: 1, MAP: 9, PIP: 12      PHYSICAL EXAM:  General: Sedated  Neurological: Sedated on propofol. Pupils 3mm and reactive, withdraws in all extremities  HEENT: +trach in place.   Pulmonary: Coarse breath sounds in all lung fields L>R  Cardiovascular: S1, S2, tachycardic   Gastrointestinal: Soft, nontender, nondistended   : Campuzano in place; dark urine  Extremities: No calf tenderness   Incision: CDI no hematoma, DP +w/ doppler       MEDICATIONS:   acetaminophen     Tablet .. 650 milliGRAM(s) Oral every 6 hours PRN  atorvastatin 20 milliGRAM(s) Oral at bedtime  chlorhexidine 0.12% Liquid 15 milliLiter(s) Oral Mucosa every 12 hours  chlorhexidine 2% Cloths 1 Application(s) Topical <User Schedule>  chlorhexidine 4% Liquid 1 Application(s) Topical once  enoxaparin Injectable 40 milliGRAM(s) SubCutaneous <User Schedule>  fluticasone propionate 50 MICROgram(s)/spray Nasal Spray 1 Spray(s) Both Nostrils two times a day  insulin regular  human corrective regimen sliding scale   SubCutaneous every 6 hours  levETIRAcetam  IVPB 750 milliGRAM(s) IV Intermittent every 12 hours  norepinephrine Infusion 0.01 MICROgram(s)/kG/Min (1.34 mL/Hr) IV Continuous <Continuous>  ondansetron Injectable 4 milliGRAM(s) IV Push every 8 hours PRN  pantoprazole  Injectable 40 milliGRAM(s) IV Push daily  polyethylene glycol 3350 17 Gram(s) Oral every 12 hours PRN  propofol Infusion 9.993 MICROgram(s)/kG/Min (4.27 mL/Hr) IV Continuous <Continuous>  senna 2 Tablet(s) Oral at bedtime  sodium chloride 0.9% lock flush 10 milliLiter(s) IV Push every 1 hour PRN    LABS:                     11.7   10.76 )-----------( 105      ( 09 Mar 2023 05:13 )             33.3     03-09    128<L>  |  92<L>  |  15  ----------------------------<  157<H>  4.1   |  27  |  0.70    Ca    8.8      09 Mar 2023 05:13  Phos  4.0     03-09  Mg     1.8     03-09    TPro  6.5  /  Alb  3.6  /  TBili  0.7  /  DBili  x   /  AST  27  /  ALT  16  /  AlkPhos  81  03-08    LIVER FUNCTIONS - ( 08 Mar 2023 04:50 )  Alb: 3.6 g/dL / Pro: 6.5 g/dL / ALK PHOS: 81 U/L / ALT: 16 U/L / AST: 27 U/L / GGT: x           ABG - ( 09 Mar 2023 05:10 )  pH, Arterial: 7.47  pH, Blood: x     /  pCO2: 38    /  pO2: 204   / HCO3: 28    / Base Excess: 3.9   /  SaO2: 100.0       Culture - Blood (collected 03-08-23 @ 17:20)  Source: .Blood Blood  Preliminary Report (03-09-23 @ 06:01):    No growth at 12 hours    Culture - Blood (collected 03-08-23 @ 17:20)  Source: .Blood Blood  Preliminary Report (03-09-23 @ 06:01):    No growth at 12 hours                                  INTERVAL HISTORY: HPI:  67 y/o right handed M with PMH of T2DM, HLD, JAGDISH, hyponatremia, tracheal stenosis s/p tracheostomy, and glioblastoma presents for cerebral angiogram with Avastin treatment.  Daughter states in January 2022, patient had onset of confusion, dizziness, and speech difficulty while in [place] and was diagnosed with brain mass. Pt evaluated upon return to the [place] at [facility] ED with CT head revealing left frontal brain mass. Pt underwent resection of left frontal enhancing tumor with GLEOLAN placed 1/27/2022. Path showed gliosarcoma, WHO grade IV, IDH wild-type, EGFR non amplified, MGMT promotor methylated. Pt was discharged to rehab and underwent radiation treatment and Temodar chemotherapy (completed 12/22). MRI 12/22 showed recurrence of brain mass. Pt presented 3/7 referred by Dr. Mackenzie for trial participation cerebral angiogram with Avastin treatment.    Pt complains of dizziness. Denies headache, nausea, vomiting, weakness, numbness, chest pain, dyspnea.   (07 Mar 2023 12:30)    24HOUR EVENTS:   Decline in mental status noted, pt globally aphasic. Stat CT head, CTA/CTP done.   Overnight pt developed severe metabolic lactic acidosis, pulmonary edema, hyponatremia and fluid overload.   Insulin ggt started, given lasix 40mg IV x1. Pt developed SVT resolved which resolved with 5mg metoprolol. Also given 1 amp bicarbonate followed by HCO3 ggt.   Emergent IJ TLC placed at bedside  Sedated with propofol. Added neosynephrine for shock state.     HOSP COURSE:   3/7: POD 0 s/p cerebral angiogram IA avastin. Pt hypertensive after being given neosynephrine. Developed flash pulmonary edema w/ desaturation, given diuretics.    3/8: POD1. Overnight, new global aphasia, right side only withdrawing to noxious stim.  CTH/CTA/CTP performed. No LVO. Severe metabolic lactic acidosis, pulmonary edema, hyponatremia and fluid overload. Started on bicarb gtt. Diuresced.   Evidence of DKA-> on insulin gtt.   POCUS this am; B lines L>R, severely hypokinetic left ventricle. Concern for cardiogenic shock. Cardiology consulted stat.   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  Type 2 diabetes mellitus  Hyperlipidemia  Iron deficiency anemia  Nephrolithiasis  Thrombocytopenia  Hyponatremia  BPH (benign prostatic hyperplasia)  S/P craniotomy  H/O tracheostomy    REVIEW OF SYSTEMS: [x] Unable to Assess due to neurologic exam   [ ] All ROS addressed below are non-contributory, except:  Neuro: [ ] Headache [ ] Back pain [ ] Numbness [ ] Weakness [ ] Ataxia [ ] Dizziness [ ] Aphasia [ ] Dysarthria [ ] Visual disturbance  Resp: [ ] Shortness of breath/dyspnea, [ ] Orthopnea [ ] Cough  CV: [ ] Chest pain [ ] Palpitation [ ] Lightheadedness [ ] Syncope  Renal: [ ] Thirst [ ] Edema  GI: [ ] Nausea [ ] Emesis [ ] Abdominal pain [ ] Constipation [ ] Diarrhea  Hem: [ ] Hematemesis [ ] bright red blood per rectum  ID: [ ] Fever [ ] Chills [ ] Dysuria  ENT: [ ] Rhinorrhea      ICU Vital Signs Last 24 Hrs  T(C): 37.6 (09 Mar 2023 07:00), Max: 38.6 (08 Mar 2023 16:00)  T(F): 99.7 (09 Mar 2023 07:00), Max: 101.5 (08 Mar 2023 16:00)  HR: 87 (09 Mar 2023 07:00) (85 - 122)  ABP: 99/53 (09 Mar 2023 07:00) (81/49 - 118/73)  ABP(mean): 69 (09 Mar 2023 07:00) (60 - 88)  RR: 19 (09 Mar 2023 07:00) (18 - 28)  SpO2: 99% (09 Mar 2023 07:00) (97% - 99%)      03-08-23 @ 07:01  -  03-09-23 @ 07:00  --------------------------------------------------------  IN: 1663 mL / OUT: 2015 mL / NET: -352 mL    03-09-23 @ 07:01  -  03-09-23 @ 07:12  --------------------------------------------------------  IN: 12.5 mL / OUT: 0 mL / NET: 12.5 mL      Mode: AC/ CMV (Assist Control/ Continuous Mandatory Ventilation), RR (machine): 12, TV (machine): 400, FiO2: 50, PEEP: 8, ITime: 1, MAP: 9, PIP: 12  CPAP 10/8 40%      PHYSICAL EXAM:  General: Calm  Neurological: Off propofol, awake, oriented x 1 (person), following simple commands, LUE AG, LLE AG, RUE 2/5, RLE withdraws and moves spontaneously. Pupils 3mm and reactive.  HEENT: +trach in place.   Pulmonary: Diminshed breath sounds L>R  Cardiovascular: S1, S2, RRR  Gastrointestinal: Soft, nontender, distended   : Campuzano in place;  Extremities: No calf tenderness   Incision: CDI no hematoma, DP +w/ doppler     MEDICATIONS:   acetaminophen     Tablet .. 650 milliGRAM(s) Oral every 6 hours PRN  atorvastatin 20 milliGRAM(s) Oral at bedtime  chlorhexidine 0.12% Liquid 15 milliLiter(s) Oral Mucosa every 12 hours  chlorhexidine 2% Cloths 1 Application(s) Topical <User Schedule>  chlorhexidine 4% Liquid 1 Application(s) Topical once  enoxaparin Injectable 40 milliGRAM(s) SubCutaneous <User Schedule>  fluticasone propionate 50 MICROgram(s)/spray Nasal Spray 1 Spray(s) Both Nostrils two times a day  insulin regular  human corrective regimen sliding scale   SubCutaneous every 6 hours  levETIRAcetam  IVPB 750 milliGRAM(s) IV Intermittent every 12 hours  norepinephrine Infusion 0.01 MICROgram(s)/kG/Min (1.34 mL/Hr) IV Continuous <Continuous>  ondansetron Injectable 4 milliGRAM(s) IV Push every 8 hours PRN  pantoprazole  Injectable 40 milliGRAM(s) IV Push daily  polyethylene glycol 3350 17 Gram(s) Oral every 12 hours PRN  propofol Infusion 9.993 MICROgram(s)/kG/Min (4.27 mL/Hr) IV Continuous <Continuous>  senna 2 Tablet(s) Oral at bedtime  sodium chloride 0.9% lock flush 10 milliLiter(s) IV Push every 1 hour PRN    LABS:                     11.7   10.76 )-----------( 105      ( 09 Mar 2023 05:13 )             33.3     03-09    128<L>  |  92<L>  |  15  ----------------------------<  157<H>  4.1   |  27  |  0.70    Ca    8.8      09 Mar 2023 05:13  Phos  4.0     03-09  Mg     1.8     03-09    TPro  6.5  /  Alb  3.6  /  TBili  0.7  /  DBili  x   /  AST  27  /  ALT  16  /  AlkPhos  81  03-08    LIVER FUNCTIONS - ( 08 Mar 2023 04:50 )  Alb: 3.6 g/dL / Pro: 6.5 g/dL / ALK PHOS: 81 U/L / ALT: 16 U/L / AST: 27 U/L / GGT: x           ABG - ( 09 Mar 2023 05:10 )  pH, Arterial: 7.47  pH, Blood: x     /  pCO2: 38    /  pO2: 204   / HCO3: 28    / Base Excess: 3.9   /  SaO2: 100.0       Culture - Blood (collected 03-08-23 @ 17:20)  Source: .Blood Blood  Preliminary Report (03-09-23 @ 06:01):    No growth at 12 hours    Culture - Blood (collected 03-08-23 @ 17:20)  Source: .Blood Blood  Preliminary Report (03-09-23 @ 06:01):    No growth at 12 hours

## 2023-03-09 NOTE — PROGRESS NOTE ADULT - ASSESSMENT
53 yo male w/ tracheal stenosis s/p tracheostomy, and glioblastoma presents for cerebral angiogram with Avastin treatment w/ cc by severe metabolic acidosis likely 2/2 cardiogenic shock, now resolved.     Assessment/Plan:   #metabolic acidosis --> RESOLVED  2/2 lactic acidosis i/s/o cardiogenic shock. Tolerated bicarb gtt well, no longer needed. Lactate now 2.  - can reduce lab monitoring to q24    #non-oliguric EUGENIA --> RESOLVED   0.7-->1.03 --> 0.7  - strict I&O   - monitor Cr q24    Thank you for the opportunity to participate in the care of your patient. The nephrology service remains available to assist with any questions or concerns. Please feel free to reach us by paging the on-call nephrology fellow for urgent issues or as below.

## 2023-03-10 NOTE — PHYSICAL THERAPY INITIAL EVALUATION ADULT - GAIT DEVIATIONS NOTED, PT EVAL
decreased brandyn/increased time in double stance/decreased step length/decreased weight-shifting ability

## 2023-03-10 NOTE — OCCUPATIONAL THERAPY INITIAL EVALUATION ADULT - GENERAL OBSERVATIONS, REHAB EVAL
Pt received semi-supine in bed, +tele, +IV, +trach to vent (SPN-CPAP, PEEP 5, 40% FiO2), +a-line, +SCDs, +NGT (disconnected), +crani incision C/D/I, family at bedside, in NAD and agreeable to OT. Cleared by JUMA Duran to see.

## 2023-03-10 NOTE — PHYSICAL THERAPY INITIAL EVALUATION ADULT - MD ORDER
Angiogram, carotid and cerebral arteries 07-Mar-2023 16:18:55 Intra arterial mannitol and avastin injection for GBM Kvng High.

## 2023-03-10 NOTE — OCCUPATIONAL THERAPY INITIAL EVALUATION ADULT - ADDITIONAL COMMENTS
History obtained from daughter who was at bedside. Pt lives alone in an apartment on the 2nd floor, no DAVID, has elevator access. Family lives close by. Prior to admit, pt reports being independent with all ADLs/IADLs and mobility without AD. Pt does own a RW and SC but does not use them. Pt has a tub/shower with shower chair but does not use chair. Pt is R hand dominant, does not wear glasses. Family reports that he does have a history of falls.

## 2023-03-10 NOTE — PHYSICAL THERAPY INITIAL EVALUATION ADULT - IMPAIRMENTS FOUND, PT EVAL
aerobic capacity/endurance/fine motor/gait, locomotion, and balance/gross motor/joint integrity and mobility/muscle strength/neuromotor development and sensory integration/poor safety awareness/posture

## 2023-03-10 NOTE — PROGRESS NOTE ADULT - SUBJECTIVE AND OBJECTIVE BOX
HPI:  69 y/o right handed male with pmhx of T2DM, HLD, JAGDISH, hyponatremia, tracheal stenosis s/p tracheostomy, and glioblastoma presents for cerebral angiogram with Avastin treatment.    Daughter states in 2022, patient had onset of confusion, dizziness, and speech difficulty while in [place] and was diagnosed with brain mass. Pt evaluated upon return to the [place] at [facility] ED with CT head revealing left frontal brain mass. Pt underwent resection of left frontal enhancing tumor with GLEOLAN placed 2022. Path showed gliosarcoma, WHO grade IV, IDH wild-type, EGFR non amplified, MGMT promotor methylated. Pt was discharged to rehab and underwent radiation treatment and Temodar chemotherapy (completed ). MRI  showed recurrence of brain mass and pt presents today referred by Dr. Mackenzie for trial participation cerebral angiogram with Avastin treatment.    Pt complains of dizziness. Denies headache, nausea, vomiting, weakness, numbness, chest pain, dyspnea.   (07 Mar 2023 12:30)    Hospital Course:  3/7: POD0 cerebral angiogram IA avastin. post op in cath lab hypertensive, +flash pulmonary edema w/ desaturation, given diuretics. on cpap   3/8: POD1. o/n new global aphasia, right side w/d. CTH/CTA/CTP performed. ABG o/n with metabolic acidosis, lactate 7.9 and hyperglycemic 320s. started on insulin gtt, 1L NS bolus and NS@200cc/hr. desatting on cpap, placed on full vent support. propofol for sedation. in SVT, resolved with 5 lopresor. fluids stopped due to worsening CXR. repeat lactate 12, given 1amp bicarb then started on bicarb gtt. POCUS this am with hypokinetic left ventricle, pending echo. Cards/nephro/ endo consulted for further recs. Insulin gtt dc'd. VEEG monitoring. S/p 3%Na.  SQL started tonight. S/p 10 NPH after FS of 166. austin gtt. S/p 40 IV lasix. Troponin downtrending 0.32 to 0.18. EEG negative for seizures. Pancultured spiked 101.5. UA negative. Bicarb gtt dc'd. TTE EF15-20%, akinetic apex and midsegments suggestive of takotsubo  3/9: POD2. ANA LILIA o/n neuro stable. remains sedated on propofol, on levo gtt. Propofol dc'd. s/p lasix 40 mg IV x 1. CXR L effusion improved. Started lantus 10u at bedtime per endocrine. Campuzano removed, f/u TOV.    3/10: POD3 ANA LILIA overnight. Passed TOV. Remains on levo drip. Neuro exam stable.       Vital Signs Last 24 Hrs  T(C): 37.5 (09 Mar 2023 22:04), Max: 38.1 (09 Mar 2023 16:57)  T(F): 99.5 (09 Mar 2023 22:04), Max: 100.6 (09 Mar 2023 16:57)  HR: 77 (09 Mar 2023 23:00) (77 - 111)  BP: 97/65 (09 Mar 2023 21:00) (97/65 - 97/65)  BP(mean): 76 (09 Mar 2023 21:00) (76 - 76)  RR: 12 (09 Mar 2023 23:00) (10 - 23)  SpO2: 97% (09 Mar 2023 23:00) (97% - 100%)    Parameters below as of 09 Mar 2023 23:00  Patient On (Oxygen Delivery Method): ventilator,CPAP    O2 Concentration (%): 40    I&O's Summary    08 Mar 2023 07:01  -  09 Mar 2023 07:00  --------------------------------------------------------  IN: 1663 mL / OUT:  mL / NET: -352 mL    09 Mar 2023 07:01  -  10 Mar 2023 00:08  --------------------------------------------------------  IN: 307.3 mL / OUT: 1225 mL / NET: -917.6 mL      GEN: laying in bed, NAD  NEURO: AAOx3 (with choices nods/shakes head appropriately), FC  CNII-XII: EOM intact, PERRL, OE spont, face symmetric  LUE/LLE/RLE 5/5 throughout, RUE 4/5 throughout  CV: RRR +S1/S2  PULM: CTAB  GI: Abd soft, NT/ND  EXT: ext warm, dry, nontender. pulses 2+ b/l  WOUND: R groin site c/d/i    TUBES/LINES:  [] Campuzano  [] Lumbar Drain  [] Wound Drains  [x] Others: R IJ    DIET:  [] NPO  [] Mechanical  [x] Tube feeds    LABS:                        11.7   10.76 )-----------( 105      ( 09 Mar 2023 05:13 )             33.3     03-09    130<L>  |  91<L>  |  15  ----------------------------<  184<H>  3.8   |  27  |  0.77    Ca    8.4      09 Mar 2023 14:02  Phos  4.0     03-09  Mg     1.8     -09    TPro  6.5  /  Alb  3.6  /  TBili  0.7  /  DBili  x   /  AST  27  /  ALT  16  /  AlkPhos  81  03-08      Urinalysis Basic - ( 08 Mar 2023 17:08 )    Color: Yellow / Appearance: Clear / S.020 / pH: x  Gluc: x / Ketone: NEGATIVE  / Bili: Negative / Urobili: 1.0 E.U./dL   Blood: x / Protein: NEGATIVE mg/dL / Nitrite: NEGATIVE   Leuk Esterase: NEGATIVE / RBC: > 10 /HPF / WBC < 5 /HPF   Sq Epi: x / Non Sq Epi: 0-5 /HPF / Bacteria: Present /HPF      CARDIAC MARKERS ( 08 Mar 2023 23:55 )  x     / 0.13 ng/mL / x     / x     / x      CARDIAC MARKERS ( 08 Mar 2023 18:43 )  x     / 0.16 ng/mL / x     / x     / x      CARDIAC MARKERS ( 08 Mar 2023 14:23 )  x     / 0.18 ng/mL / x     / x     / x      CARDIAC MARKERS ( 08 Mar 2023 08:58 )  x     / 0.32 ng/mL / 114 U/L / x     / 5.3 ng/mL      CAPILLARY BLOOD GLUCOSE      POCT Blood Glucose.: 130 mg/dL (09 Mar 2023 22:53)  POCT Blood Glucose.: 131 mg/dL (09 Mar 2023 17:52)  POCT Blood Glucose.: 206 mg/dL (09 Mar 2023 13:13)  POCT Blood Glucose.: 149 mg/dL (09 Mar 2023 06:39)      Drug Levels: [] N/A    CSF Analysis: [] N/A      Allergies    amoxicillin (Rash)    Intolerances      MEDICATIONS:  Antibiotics:    Neuro:  acetaminophen     Tablet .. 650 milliGRAM(s) Oral every 6 hours PRN  levETIRAcetam  IVPB 750 milliGRAM(s) IV Intermittent every 12 hours  ondansetron Injectable 4 milliGRAM(s) IV Push every 8 hours PRN    Anticoagulation:  enoxaparin Injectable 40 milliGRAM(s) SubCutaneous <User Schedule>    OTHER:  atorvastatin 20 milliGRAM(s) Oral at bedtime  chlorhexidine 0.12% Liquid 15 milliLiter(s) Oral Mucosa every 12 hours  chlorhexidine 2% Cloths 1 Application(s) Topical <User Schedule>  fluticasone propionate 50 MICROgram(s)/spray Nasal Spray 1 Spray(s) Both Nostrils two times a day  insulin glargine Injectable (LANTUS) 10 Unit(s) SubCutaneous at bedtime  insulin regular  human corrective regimen sliding scale   SubCutaneous every 6 hours  norepinephrine Infusion 0.05 MICROgram(s)/kG/Min IV Continuous <Continuous>  pantoprazole  Injectable 40 milliGRAM(s) IV Push daily  polyethylene glycol 3350 17 Gram(s) Oral every 12 hours PRN  senna 2 Tablet(s) Oral at bedtime    IVF:    CULTURES:  Culture Results:   No growth at 1 day. ( @ 17:20)  Culture Results:   No growth at 1 day. ( @ 17:20)    RADIOLOGY & ADDITIONAL TESTS:      ASSESSMENT:  69 y/o right handed male with pmhx of T2DM, HLD, JAGDISH, hyponatremia, tracheal stenosis s/p tracheostomy, and glioblastoma now s/p cerebral angiogram with Avastin treatment (3/7/23).     C71.9    Abnormal electrocardiogram [ECG] [EKG]    Allergy, unspecified, initial encounter    Anemia, unspecified    Benign prostatic hyperplasia without lower urinary tract symptoms    Calculus of kidney    Cough, unspecified    Depression, unspecified    ENCOUNTER FOR ATTENT    Encounter for general adult medical examination without abnormal findings    Encounter for immunization    Encounter for immunization safety counseling    Encounter for other preprocedural examination    Encounter for screening for malignant neoplasm of prostate    Encounter for screening for other metabolic disorders    Encounter for screening for other viral diseases    ESSENTIAL (PRIMARY)    Gastro-esophageal reflux disease without esophagitis    Generalized anxiety disorder    History of Glioblastoma    Hyperlipidemia, unspecified    Hypo-osmolality and hyponatremia    Hypotension, unspecified    Iron deficiency    Iron deficiency anemia, unspecified    LONG TERM (CURRENT)    LONG TERM (CURRENT)    LONG TERM (CURRENT)    Malignant neoplasm of brain, unspecified    Nausea with vomiting, unspecified    Other constipation    Other fatigue    Other injury of unspecified body region, initial encounter    OTHER SPECIFIED DISE    Personal history of malignant neoplasm, unspecified    Personal history of other diseases of the nervous system and sense organs    Personal history of other endocrine, nutritional and metabolic disease    Personal history of other mental and behavioral disorders    Personal history of transient ischemic attack (TIA), and cerebral infarction without residual deficits    POSTPROCEDURAL SUBGL    Pruritus, unspecified    Shortness of breath    Tachycardia, unspecified    Thrombocytopenia, unspecified    Unspecified abdominal pain    Unspecified visual disturbance    Urinary calculus, unspecified    Vitamin D deficiency, unspecified    No pertinent family history in first degree relatives    FH: diabetes mellitus (Father, Mother)    FH: hyperlipidemia (Father)    FH: hypertension (Father, Mother)    Handoff    No pertinent past medical history    Diabetes mellitus    Hypertension    Glioblastoma    Type 2 diabetes mellitus    Hyperlipidemia    Iron deficiency anemia    Nephrolithiasis    Thrombocytopenia    Hyponatremia    BPH (benign prostatic hyperplasia)    No pertinent past medical history    GBM (glioblastoma multiforme)    GBM (glioblastoma multiforme)    Acute respiratory failure with hypoxia    Tracheostomy status    Acute hyponatremia    Angiogram, carotid and cerebral arteries    No significant past surgical history    No significant past surgical history    S/P craniotomy    H/O tracheostomy    No significant past surgical history    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        Neuro   - Vitals/neuro q1   - CTA/CTP/repeat CTH negative   - Pain control   - Cont home keppra 750 bid   - Stroke/neuro consult     Cards  - Levo for MAP 65   - Monitor CO/CI/ SvO2   - Cards following   - TTE EF 15-20%, akinetic apex and midsegments suggestive of takotsubo     Pulm  - CPAP 5/5 FiO2 40%   - Pulm following    GI  - NGT - Glucerna 1.2 @ 30   - Bowel regimen held for   - BM 3/9  - cont home protonix    RENAL:   - Chronic hyponatremia   - voiding  - trend lactate    HEME:  - DVT ppx: SQL, SCD L side only  - LE dopplers + R IM calf DVT, continue serial dopplers 3/13    ID:   - afebrile  - 3/8 pancx neg    ENDO:   - DKA resolved    - DM: A1C 5.8, coninue ISS   - lantus 10u at bedtime    DISPO:   - ICU status, full code, dispo pending    D/W DR. Ho and Dr. Huggins

## 2023-03-10 NOTE — PHYSICAL THERAPY INITIAL EVALUATION ADULT - PERTINENT HX OF CURRENT PROBLEM, REHAB EVAL
69 y/o right handed male with pmhx of T2DM, HLD, JAGDISH, hyponatremia, tracheal stenosis s/p tracheostomy, and glioblastoma now s/p cerebral angiogram with Avastin treatment (3/7/23).

## 2023-03-10 NOTE — PROGRESS NOTE ADULT - SUBJECTIVE AND OBJECTIVE BOX
SUBJECTIVE / INTERVAL HPI: Patient was seen and examined this morning. He was started on Glucerna last night [10 mL/hr (11PM-1AM), then 20 mL/hr (2AM-5AM), then 30 mL/hr (6AM-8AM)]; however, tube feeds were stopped this morning after the patient had multiple bowel movements. Blood glucose levels remain within target range.     CAPILLARY BLOOD GLUCOSE & INSULIN RECEIVED  206 mg/dL (03-09 @ 13:13) ? 4 units of sliding scale.  131 mg/dL (03-09 @ 17:52) ? Ø  130 mg/dL (03-09 @ 22:53) ? 10 units of lantus.   139 mg/dL (03-10 @ 06:46) ? Ø  134 mg/dL (03-10 @ 13:23) ? Ø    PHYSICAL EXAM  Vital Signs Last 24 Hrs  T(C): 36.6 (10 Mar 2023 09:05), Max: 38.1 (09 Mar 2023 16:57)  T(F): 97.9 (10 Mar 2023 09:05), Max: 100.6 (09 Mar 2023 16:57)  HR: 112 (10 Mar 2023 13:00) (77 - 128)  BP: 97/65 (09 Mar 2023 21:00) (97/65 - 97/65)  BP(mean): 76 (09 Mar 2023 21:00) (76 - 76)  RR: 24 (10 Mar 2023 13:00) (12 - 28)  SpO2: 95% (10 Mar 2023 13:00) (92% - 100%)    Parameters below as of 10 Mar 2023 13:00  Patient On (Oxygen Delivery Method): tracheostomy collar  O2 Flow (L/min): 10  O2 Concentration (%): 40    Constitutional: Awake, alert, on mechanical ventilation via tracheostomy, following commands.   HEENT: (+) Gauze dressing in place.   Neck: (+) Tracheostomy.   Respiratory: Lungs clear to ausculation bilaterally.   Cardiovascular: regular rhythm, normal S1 and S2, no audible murmurs.   GI: soft, non-distended, bowel sounds present.  Extremities: No lower extremity edema.    LABS  CBC - WBC/HGB/HTC/PLT: 7.50/10.4/30.1/76 (03-10-23)  BMP - Na/K/Cl/Bicarb/BUN/Cr/Gluc/AG/eGFR: 126/3.7/92/29/15/0.69/150/5/101 (03-10-23)  Ca - 8.7 (03-10-23)  Phos - 2.6 (03-10-23)  Mg - 1.8 (03-10-23)  LFT - Alb/Tprot/Tbili/Dbili/AlkPhos/ALT/AST: 3.6/--/0.7/--/81/16/27 (03-08-23)  PT/aPTT/INR: 13.9/27.5/1.17 (03-07-23)   Thyroid Stimulating Hormone, Serum: 1.210 (03-08-23)    MEDICATIONS  MEDICATIONS  (STANDING):  atorvastatin 20 milliGRAM(s) Oral at bedtime  chlorhexidine 0.12% Liquid 15 milliLiter(s) Oral Mucosa every 12 hours  chlorhexidine 2% Cloths 1 Application(s) Topical <User Schedule>  enoxaparin Injectable 40 milliGRAM(s) SubCutaneous <User Schedule>  fluticasone propionate 50 MICROgram(s)/spray Nasal Spray 1 Spray(s) Both Nostrils two times a day  insulin glargine Injectable (LANTUS) 10 Unit(s) SubCutaneous at bedtime  insulin regular  human corrective regimen sliding scale   SubCutaneous every 6 hours  levETIRAcetam  IVPB 750 milliGRAM(s) IV Intermittent every 12 hours  sucralfate 1 Gram(s) Oral every 12 hours    MEDICATIONS  (PRN):  acetaminophen     Tablet .. 650 milliGRAM(s) Oral every 6 hours PRN Mild Pain (1 - 3)  ondansetron Injectable 4 milliGRAM(s) IV Push every 8 hours PRN Nausea and/or Vomiting  sodium chloride 0.9% lock flush 10 milliLiter(s) IV Push every 1 hour PRN Pre/post blood products, medications, blood draw, and to maintain line patency    ASSESSMENT / RECOMMENDATIONS  Mr. Norwood is a 68-year-old male with a past medical history of type 2 diabetes mellitus, hyperlipidemia, iron deficiency anemia, tracheal stenosis (s/p tracheostomy) and glioblastoma (found to have a mass in 1/2022, s/p resection of left frontal enhancing tumor with GLEOLAN placed on 1/27/22 with pathology showing gliosarcoma, WHO grade IV s/p radiation and chemotherapy completed on 12/2022, repeat MRI showing recurrence of brain mass on 12/2022) who presented for further management, now s/p cerebral angiogram with Avastin treatment (3/7/23). Post-operative course was complicated by flash pulmonary edema, desaturation, new global aphasia, right sided weakness, lactic acidosis and hyperglycemia. Endocrinology was consulted for recommendations regarding glycemic control.     A1C: 5.8 %  BUN: 15  Creatinine: 0.69  GFR: 101  Weight: 71.214  BMI: 24.6    # Type 2 diabetes mellitus  - Please continue lantus *** units at bedtime.   - Continue lispro *** units before each meal.  - Continue lispro moderate / low dose sliding scale before meals and at bedtime.  - Patient's fingerstick glucose goal is 100-180 mg/dL.    - For discharge, patient can ***.    - Patient can follow up at discharge with [facility] by calling [phone] to make an appointment.      Case discussed with Dr. Hoang. Primary team updated.       Fabrice Madrid    Endocrinology Fellow    Service Pager: [phone]    SUBJECTIVE / INTERVAL HPI: Patient was seen and examined this morning. He was started on Glucerna last night [10 mL/hr (11PM-1AM), then 20 mL/hr (2AM-5AM), then 30 mL/hr (6AM-8AM)]; however, tube feeds were stopped this morning after the patient had multiple bowel movements. Blood glucose levels remain within target range.     CAPILLARY BLOOD GLUCOSE & INSULIN RECEIVED  206 mg/dL (03-09 @ 13:13) ? 4 units of sliding scale.  131 mg/dL (03-09 @ 17:52) ? Ø  130 mg/dL (03-09 @ 22:53) ? 10 units of lantus.   139 mg/dL (03-10 @ 06:46) ? Ø  134 mg/dL (03-10 @ 13:23) ? Ø    PHYSICAL EXAM  Vital Signs Last 24 Hrs  T(C): 36.6 (10 Mar 2023 09:05), Max: 38.1 (09 Mar 2023 16:57)  T(F): 97.9 (10 Mar 2023 09:05), Max: 100.6 (09 Mar 2023 16:57)  HR: 112 (10 Mar 2023 13:00) (77 - 128)  BP: 97/65 (09 Mar 2023 21:00) (97/65 - 97/65)  BP(mean): 76 (09 Mar 2023 21:00) (76 - 76)  RR: 24 (10 Mar 2023 13:00) (12 - 28)  SpO2: 95% (10 Mar 2023 13:00) (92% - 100%)    Parameters below as of 10 Mar 2023 13:00  Patient On (Oxygen Delivery Method): tracheostomy collar  O2 Flow (L/min): 10  O2 Concentration (%): 40    Constitutional: Awake, alert, on mechanical ventilation via tracheostomy, following commands.   HEENT: (+) Gauze dressing in place.   Neck: (+) Tracheostomy.   Respiratory: Lungs clear to ausculation bilaterally.   Cardiovascular: regular rhythm, normal S1 and S2, no audible murmurs.   GI: soft, non-distended, bowel sounds present.  Extremities: No lower extremity edema.    LABS  CBC - WBC/HGB/HTC/PLT: 7.50/10.4/30.1/76 (03-10-23)  BMP - Na/K/Cl/Bicarb/BUN/Cr/Gluc/AG/eGFR: 126/3.7/92/29/15/0.69/150/5/101 (03-10-23)  Ca - 8.7 (03-10-23)  Phos - 2.6 (03-10-23)  Mg - 1.8 (03-10-23)  LFT - Alb/Tprot/Tbili/Dbili/AlkPhos/ALT/AST: 3.6/--/0.7/--/81/16/27 (03-08-23)  PT/aPTT/INR: 13.9/27.5/1.17 (03-07-23)   Thyroid Stimulating Hormone, Serum: 1.210 (03-08-23)    MEDICATIONS  MEDICATIONS  (STANDING):  atorvastatin 20 milliGRAM(s) Oral at bedtime  chlorhexidine 0.12% Liquid 15 milliLiter(s) Oral Mucosa every 12 hours  chlorhexidine 2% Cloths 1 Application(s) Topical <User Schedule>  enoxaparin Injectable 40 milliGRAM(s) SubCutaneous <User Schedule>  fluticasone propionate 50 MICROgram(s)/spray Nasal Spray 1 Spray(s) Both Nostrils two times a day  insulin glargine Injectable (LANTUS) 10 Unit(s) SubCutaneous at bedtime  insulin regular  human corrective regimen sliding scale   SubCutaneous every 6 hours  levETIRAcetam  IVPB 750 milliGRAM(s) IV Intermittent every 12 hours  sucralfate 1 Gram(s) Oral every 12 hours    MEDICATIONS  (PRN):  acetaminophen     Tablet .. 650 milliGRAM(s) Oral every 6 hours PRN Mild Pain (1 - 3)  ondansetron Injectable 4 milliGRAM(s) IV Push every 8 hours PRN Nausea and/or Vomiting  sodium chloride 0.9% lock flush 10 milliLiter(s) IV Push every 1 hour PRN Pre/post blood products, medications, blood draw, and to maintain line patency    ASSESSMENT / RECOMMENDATIONS  Mr. Norwood is a 68-year-old male with a past medical history of type 2 diabetes mellitus, hyperlipidemia, iron deficiency anemia, tracheal stenosis (s/p tracheostomy) and glioblastoma (found to have a mass in 1/2022, s/p resection of left frontal enhancing tumor with GLEOLAN placed on 1/27/22 with pathology showing gliosarcoma, WHO grade IV s/p radiation and chemotherapy completed on 12/2022, repeat MRI showing recurrence of brain mass on 12/2022) who presented for further management, now s/p cerebral angiogram with Avastin treatment (3/7/23). Post-operative course was complicated by flash pulmonary edema, desaturation, new global aphasia, right sided weakness, lactic acidosis and hyperglycemia. Endocrinology was consulted for recommendations regarding glycemic control.     A1C: 5.8 %  BUN: 15  Creatinine: 0.69  GFR: 101  Weight: 71.214  BMI: 24.6    # Type 2 diabetes mellitus  - Blood glucose levels remain within target range.   - Please continue with lantus 10 units at bedtime.   - Continue regular insulin moderate dose sliding scale every 6 hours.  - Patient's fingerstick glucose goal is 100-180 mg/dL.      Case discussed with Dr. Hoang. Primary team updated.       Fabrice Madrid    Endocrinology Fellow    Service Pager: [phone]

## 2023-03-10 NOTE — PHYSICAL THERAPY INITIAL EVALUATION ADULT - ADDITIONAL COMMENTS
pt lives w/ family in a private home w/ stairs. has RW and SC from previous but does not use them. Family states that he was mostly independent in ADLs prior to this admission

## 2023-03-10 NOTE — PROGRESS NOTE ADULT - SUBJECTIVE AND OBJECTIVE BOX
Subjective/ROS: Patient seen and examined at bedside, awake and alert, wanting to stand up and speaking with family. Cr remains stable, electrolytes are stable. Hyponatremic but has been chronically low.      VITALS  Vital Signs Last 24 Hrs  T(C): 36.6 (10 Mar 2023 09:05), Max: 38.1 (09 Mar 2023 16:57)  T(F): 97.9 (10 Mar 2023 09:05), Max: 100.6 (09 Mar 2023 16:57)  HR: 112 (10 Mar 2023 13:00) (77 - 128)  BP: 97/65 (09 Mar 2023 21:00) (97/65 - 97/65)  BP(mean): 76 (09 Mar 2023 21:00) (76 - 76)  RR: 24 (10 Mar 2023 13:00) (12 - 28)  SpO2: 95% (10 Mar 2023 13:00) (92% - 100%)    Parameters below as of 10 Mar 2023 13:00  Patient On (Oxygen Delivery Method): tracheostomy collar  O2 Flow (L/min): 10  O2 Concentration (%): 40    CAPILLARY BLOOD GLUCOSE      POCT Blood Glucose.: 134 mg/dL (10 Mar 2023 13:23)  POCT Blood Glucose.: 139 mg/dL (10 Mar 2023 06:46)  POCT Blood Glucose.: 130 mg/dL (09 Mar 2023 22:53)  POCT Blood Glucose.: 131 mg/dL (09 Mar 2023 17:52)      PHYSICAL EXAM  GENERAL: NAD  HEENT: JUAN, EOMI, neck supple, no JVP. Trach in place, insertion site clean w/o erythema   CHEST/LUNG: anterior lung sounds w/ slight crackles, on CPAP  HEART: Regular rate and rhythm, no murmur, no gallops, no rub   ABDOMEN: Soft, nontender, non distended  : No flank or supra pubic tenderness, gonzalez with dark concentrated urine  EXTREMITIES: no pedal edema  Neurology: tracks with eyes, squeezes hand, on SBP    SKIN: No rash or skin lesion     MEDICATIONS  (STANDING):  atorvastatin 20 milliGRAM(s) Oral at bedtime  chlorhexidine 0.12% Liquid 15 milliLiter(s) Oral Mucosa every 12 hours  chlorhexidine 2% Cloths 1 Application(s) Topical <User Schedule>  enoxaparin Injectable 40 milliGRAM(s) SubCutaneous <User Schedule>  fluticasone propionate 50 MICROgram(s)/spray Nasal Spray 1 Spray(s) Both Nostrils two times a day  insulin glargine Injectable (LANTUS) 10 Unit(s) SubCutaneous at bedtime  insulin regular  human corrective regimen sliding scale   SubCutaneous every 6 hours  levETIRAcetam  IVPB 750 milliGRAM(s) IV Intermittent every 12 hours  sucralfate 1 Gram(s) Oral every 12 hours    MEDICATIONS  (PRN):  acetaminophen     Tablet .. 650 milliGRAM(s) Oral every 6 hours PRN Mild Pain (1 - 3)  ondansetron Injectable 4 milliGRAM(s) IV Push every 8 hours PRN Nausea and/or Vomiting  sodium chloride 0.9% lock flush 10 milliLiter(s) IV Push every 1 hour PRN Pre/post blood products, medications, blood draw, and to maintain line patency      amoxicillin (Rash)      LABS                        10.4   7.50  )-----------( 76       ( 10 Mar 2023 05:45 )             30.1     03-10    126<L>  |  92<L>  |  15  ----------------------------<  150<H>  3.7   |  29  |  0.69    Ca    8.7      10 Mar 2023 05:45  Phos  2.6     03-10  Mg     1.8     03-10        Urinalysis Basic - ( 08 Mar 2023 17:08 )    Color: Yellow / Appearance: Clear / S.020 / pH: x  Gluc: x / Ketone: NEGATIVE  / Bili: Negative / Urobili: 1.0 E.U./dL   Blood: x / Protein: NEGATIVE mg/dL / Nitrite: NEGATIVE   Leuk Esterase: NEGATIVE / RBC: > 10 /HPF / WBC < 5 /HPF   Sq Epi: x / Non Sq Epi: 0-5 /HPF / Bacteria: Present /HPF      CARDIAC MARKERS ( 08 Mar 2023 23:55 )  x     / 0.13 ng/mL / x     / x     / x      CARDIAC MARKERS ( 08 Mar 2023 18:43 )  x     / 0.16 ng/mL / x     / x     / x              IMAGING/EKG/ETC

## 2023-03-10 NOTE — PROGRESS NOTE ADULT - ASSESSMENT
55 yo male w/ tracheal stenosis s/p tracheostomy, and glioblastoma presents for cerebral angiogram with Avastin treatment w/ cc by severe metabolic acidosis likely 2/2 cardiogenic shock, now resolved.     Assessment/Plan:   #metabolic acidosis --> RESOLVED  2/2 lactic acidosis i/s/o cardiogenic shock.    #non-oliguric EUGENIA -- RESOLVED  - strict I&O   - monitor Cr q24    Thank you for the opportunity to participate in the care of your patient. The nephrology service will sign off at this time, but remains available to assist with any questions or concerns.

## 2023-03-10 NOTE — PROGRESS NOTE ADULT - ASSESSMENT
69 yo M PMHx of T2DM, HLD, JAGDISH, hyponatremia, tracheal stenosis s/p tracheostomy, and glioblastoma s/p resection of the left frontal enhancing tumor admitted for cerebral angiogram with Avastin treatment, c/b stress cardiomyopathy 2/2 excess norepinephrine.    #Stress induced cardiomyopathy (Takotsubo cardiomyopathy)   Likely due to excess norepinephrine given relative good functional capacity, and normal TTE prior to the procedure.  TTE 3/2021: Normal Bi-V function  TTE with severely reduced LV systolic function EF 15-20%, apex and mid segments are akineticECG:  NSR, 1st degree AVB, 0.5mm- 1mm DAVID v1-v3, IVCD. Troponin peaked 0.32, no need to trend further   Tele: sinus tachycardia overnight - possibly 2/2 dehydration  Patient weaned off pressors and on CPAP trial.   Start Metoprolol Tartrate 12.5mg BID post extubation if BP can tolerate it.   Repeat TTE next week

## 2023-03-10 NOTE — PHYSICAL THERAPY INITIAL EVALUATION ADULT - GENERAL OBSERVATIONS, REHAB EVAL
pt received/returned semi-supine in bed +IV, +tele, +A line, +cranial incision, +B/L SCDs, +gonzalez, +trach to vent CPAP FiO2 40%, family present, in NAD

## 2023-03-10 NOTE — OCCUPATIONAL THERAPY INITIAL EVALUATION ADULT - MODALITIES TREATMENT COMMENTS
Cranial Nerves II - XII: II: PERRLA; visual fields are full to confrontation III, IV, VI: EOMI, no nystagmus appreciated V: facial sensation intact to light touch V1-V3 b/l VII: no ptosis, no facial droop, symmetric eyebrow raise and closure VIII: hearing intact to finger rub b/l  XI: head turning limited when turning towards L side and shoulder shrug intact b/l XII: tongue protrusion midline. Vision Quadrant Test: unable to accurately assess as pt with difficulty following 2-3 step directions

## 2023-03-10 NOTE — PROGRESS NOTE ADULT - ATTENDING COMMENTS
Pt seen on rounds this afternoon.    Appears to have had further increase in strength on the R side  Feeds had been started last night, but now stopped because of frequent bowel movements.  Most of these were apparently "pasty" in consistency, though one was more diarrhea  Abdomen is soft and non-distended  Fingersticks have been in target range and extremely stable--essentially all values between 130 and 140 mg%.  (The lack of even a mild rise in his blood sugars after the feeds were started might suggest that his nutrient absorption was somewhat impaired).  Will continue the Lantus at 10 units qhs, and hold off on starting nutritional regular insulin.  Would try changing the feeds to Vital, a semi-elemental (pre-digested) formula which is usually well tolerated

## 2023-03-10 NOTE — CHART NOTE - NSCHARTNOTEFT_GEN_A_CORE
POD3 ANA LILIA overnight. Remains on levo drip. Neuro exam stable. multiple bouts of diarrhea overnight and AM. Tube feeds held. s/p albumin 250 cc +  cc for tachycardia in the setting of negative fluid status. Tolerating CPAP. Tolerating trach collar x 3 hours, switched back to CPAP overnight. Off levo gtt.

## 2023-03-10 NOTE — OCCUPATIONAL THERAPY INITIAL EVALUATION ADULT - MODIFIED CLINICAL TEST OF SENSORY INTEGRATION IN BALANCE TEST
Pt able to tolerate static stand with mod x2 person assist via b/l HHA for ~30 seconds, able to lift RLE and step forward but unable to follow commands to sequence lifting LLE.

## 2023-03-10 NOTE — PROGRESS NOTE ADULT - SUBJECTIVE AND OBJECTIVE BOX
Interval Events: Reviewed  Patient seen and examined at bedside.    Patient is a 68y old  Male who presents with a chief complaint of cerebral angiogram with Avastin (10 Mar 2023 08:49)    he is doing well awake and following commands  PAST MEDICAL & SURGICAL HISTORY:  Hypertension      Glioblastoma  Grade 4 Gliosarcoma dx 2022      Type 2 diabetes mellitus      Hyperlipidemia      Iron deficiency anemia      Nephrolithiasis      Thrombocytopenia      Hyponatremia      BPH (benign prostatic hyperplasia)      S/P craniotomy      H/O tracheostomy          MEDICATIONS:  Pulmonary:    Antimicrobials:    Anticoagulants:  enoxaparin Injectable 40 milliGRAM(s) SubCutaneous <User Schedule>    Cardiac:  norepinephrine Infusion 0.05 MICROgram(s)/kG/Min IV Continuous <Continuous>      Allergies    amoxicillin (Rash)    Intolerances        Vital Signs Last 24 Hrs  T(C): 37.8 (10 Mar 2023 05:27), Max: 38.1 (09 Mar 2023 16:57)  T(F): 100 (10 Mar 2023 05:27), Max: 100.6 (09 Mar 2023 16:57)  HR: 120 (10 Mar 2023 09:00) (77 - 122)  BP: 97/65 (09 Mar 2023 21:00) (97/65 - 97/65)  BP(mean): 76 (09 Mar 2023 21:00) (76 - 76)  RR: 19 (10 Mar 2023 09:00) (10 - 28)  SpO2: 100% (10 Mar 2023 09:00) (93% - 100%)    Parameters below as of 10 Mar 2023 09:00  Patient On (Oxygen Delivery Method): ventilator,CPAP    O2 Concentration (%): 40     @ 07:  -  03-10 @ 07:00  --------------------------------------------------------  IN: 884.1 mL / OUT: 1625 mL / NET: -740.9 mL    03-10 @ 07:01  -  03-10 @ 09:47  --------------------------------------------------------  IN: 34 mL / OUT: 200 mL / NET: -166 mL      Mode: CPAP with PS  FiO2: 40  PEEP: 5  PS: 5  MAP: 6.9  PIP: 10      Review of Systems:   •	General: negative  •	Skin/Breast: negative  •	Ophthalmologic: negative  •	ENMT: negative  •	Respiratory and Thorax: negative  •	Cardiovascular: negative  •	Gastrointestinal: negative  •	Genitourinary: negative  •	Musculoskeletal: negative  •	Neurological: negative  •	Psychiatric: negative  •	Hematology/Lymphatics: negative  •	Endocrine: negative  •	Allergic/Immunologic: negative    Physical Exam:   • Constitutional:	refer to the dietion /Nutritionist note  • Eyes:	EOMI; PERRL; no drainage or redness  • ENMT:	No oral lesions; no gross abnormalities  • Neck	No bruits; no thyromegaly or nodules  • Breasts:	not examined  • Back:	No deformity or limitation of movement  • Respiratory:	Breath Sounds equal & clear to percussion & auscultation, no accessory muscle use  • Cardiovascular:	Regular rate & rhythm, normal S1, S2; no murmurs, gallops or rubs; no S3, S4  • Gastrointestinal:	Soft, non-tender, no hepatosplenomegaly, normal bowel sounds  • Genitourinary:	not examined  • Rectal: not examined  • Extremities:	No cyanosis, clubbing or edema  • Vascular:	Equal and normal pulses (carotid, femoral, dorsalis pedis)  • Neurologica:l	not examined  • Skin:	No lesions; no rash  • Lymph Nodes:	No lymphadedenopathy  • Musculoskeletal:	No joint pain, swelling or deformity; no limitation of movement        LABS:  ABG - ( 10 Mar 2023 05:32 )  pH, Arterial: 7.49  pH, Blood: x     /  pCO2: 40    /  pO2: 114   / HCO3: 30    / Base Excess: 6.6   /  SaO2: 98.8                CBC Full  -  ( 10 Mar 2023 05:45 )  WBC Count : 7.50 K/uL  RBC Count : 3.21 M/uL  Hemoglobin : 10.4 g/dL  Hematocrit : 30.1 %  Platelet Count - Automated : 76 K/uL  Mean Cell Volume : 93.8 fl  Mean Cell Hemoglobin : 32.4 pg  Mean Cell Hemoglobin Concentration : 34.6 gm/dL  Auto Neutrophil # : x  Auto Lymphocyte # : x  Auto Monocyte # : x  Auto Eosinophil # : x  Auto Basophil # : x  Auto Neutrophil % : x  Auto Lymphocyte % : x  Auto Monocyte % : x  Auto Eosinophil % : x  Auto Basophil % : x    03-10    126<L>  |  92<L>  |  15  ----------------------------<  150<H>  3.7   |  29  |  0.69    Ca    8.7      10 Mar 2023 05:45  Phos  2.6     03-10  Mg     1.8     03-10            Urinalysis Basic - ( 08 Mar 2023 17:08 )    Color: Yellow / Appearance: Clear / S.020 / pH: x  Gluc: x / Ketone: NEGATIVE  / Bili: Negative / Urobili: 1.0 E.U./dL   Blood: x / Protein: NEGATIVE mg/dL / Nitrite: NEGATIVE   Leuk Esterase: NEGATIVE / RBC: > 10 /HPF / WBC < 5 /HPF   Sq Epi: x / Non Sq Epi: 0-5 /HPF / Bacteria: Present /HPF    < from: Xray Chest 1 View- PORTABLE-Urgent (Xray Chest 1 View- PORTABLE-Urgent .) (23 @ 18:19) >    ACC: [id number] EXAM:  XR CHEST PORTABLE URGENT 1V   ORDERED BY: ROC ANDERS     PROCEDURE DATE:  2023          INTERPRETATION:  XR CHEST URGENT dated 3/9/2023 6:19 PM    HISTORY: Nasogastric tube placement    COMPARISON: Chest radiograph from 3/9/2023 at 6:44 AM    FINDINGS: Interval placement of enteric catheter with distal tip   appropriately positioned in the left upper quadrant. No interval change   in additional support devices. Lung apices are not included in the   field-of-view. Continued improvement in left lung opacity. Grossly   unchanged right lower lung hazy opacity. There are no pleural effusions.   The cardiomediastinal silhouette is unremarkable. No acute osseous   abnormality.      IMPRESSION:    1.  Interval placement of appropriately positioned enteric catheter.  2.  Improvement in left lung opacity. Right lower lung opacity, grossly   unchanged.    < end of copied text >   CXR from today continues to improve           Culture Results:   No growth at 1 day. ( @ 17:20)  Culture Results:   No growth at 1 day. ( @ 17:20)      RADIOLOGY & ADDITIONAL STUDIES (The following images were personally reviewed):

## 2023-03-10 NOTE — PROGRESS NOTE ADULT - SUBJECTIVE AND OBJECTIVE BOX
INTERVAL HISTORY: HPI:  69 y/o right handed M with PMH of T2DM, HLD, JAGDISH, hyponatremia, tracheal stenosis s/p tracheostomy, and glioblastoma presents for cerebral angiogram with Avastin treatment.  Daughter states in January 2022, patient had onset of confusion, dizziness, and speech difficulty while in [place] and was diagnosed with brain mass. Pt evaluated upon return [place] the [place] at [facility] ED with CT head revealing left frontal brain mass. Pt underwent resection of left frontal enhancing tumor with GLEOLAN placed 1/27/2022. Path showed gliosarcoma, WHO grade IV, IDH wild-type, EGFR non amplified, MGMT promotor methylated. Pt was discharged to rehab and underwent radiation treatment and Temodar chemotherapy (completed 12/22). MRI 12/22 showed recurrence of brain mass. Pt presented 3/7 referred by Dr. Mackenzie for trial participation cerebral angiogram with Avastin treatment.    Pt complains of dizziness. Denies headache, nausea, vomiting, weakness, numbness, chest pain, dyspnea.   (07 Mar 2023 12:30)    24HOUR EVENTS:   Shock improving. Weaning pressors. Follows commands.     HOSP COURSE:   3/7: POD 0 s/p cerebral angiogram IA avastin. Pt hypertensive after being given neosynephrine. Developed flash pulmonary edema w/ desaturation, given diuretics.    3/8: POD1. Overnight, new global aphasia, right side only withdrawing to noxious stim.  CTH/CTA/CTP performed. No LVO. Severe metabolic lactic acidosis, pulmonary edema, hyponatremia and fluid overload. Started on bicarb gtt. Diuresced.   Evidence of DKA-> on insulin gtt.   POCUS this am; B lines L>R, severely hypokinetic left ventricle. Concern for cardiogenic shock. Cardiology consulted stat.   3/9: Shock improving. Weaning pressors. Follows commands.       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  Type 2 diabetes mellitus  Hyperlipidemia  Iron deficiency anemia  Nephrolithiasis  Thrombocytopenia  Hyponatremia  BPH (benign prostatic hyperplasia)  S/P craniotomy  H/O tracheostomy    REVIEW OF SYSTEMS: [x] Unable to Assess due to neurologic exam   [ ] All ROS addressed below are non-contributory, except:  Neuro: [ ] Headache [ ] Back pain [ ] Numbness [ ] Weakness [ ] Ataxia [ ] Dizziness [ ] Aphasia [ ] Dysarthria [ ] Visual disturbance  Resp: [ ] Shortness of breath/dyspnea, [ ] Orthopnea [ ] Cough  CV: [ ] Chest pain [ ] Palpitation [ ] Lightheadedness [ ] Syncope  Renal: [ ] Thirst [ ] Edema  GI: [ ] Nausea [ ] Emesis [ ] Abdominal pain [ ] Constipation [ ] Diarrhea  Hem: [ ] Hematemesis [ ] bright red blood per rectum  ID: [ ] Fever [ ] Chills [ ] Dysuria  ENT: [ ] Rhinorrhea      ICU Vital Signs Last 24 Hrs  T(C): 37.8 (10 Mar 2023 05:27), Max: 38.1 (09 Mar 2023 16:57)  T(F): 100 (10 Mar 2023 05:27), Max: 100.6 (09 Mar 2023 16:57)  HR: 87 (10 Mar 2023 06:00) (77 - 111)  BP: 97/65 (09 Mar 2023 21:00) (97/65 - 97/65)  BP(mean): 76 (09 Mar 2023 21:00) (76 - 76)  ABP: 115/58 (10 Mar 2023 06:00) (95/49 - 118/62)  ABP(mean): 76 (10 Mar 2023 06:00) (65 - 82)  RR: 19 (10 Mar 2023 06:00) (10 - 23)  SpO2: 93% (10 Mar 2023 06:00) (93% - 100%)      03-09-23 @ 07:01  -  03-10-23 @ 07:00  --------------------------------------------------------  IN: 884.1 mL / OUT: 1625 mL / NET: -740.9 mL      Mode: CPAP with PS, FiO2: 40, PEEP: 5, PS: 5, MAP: 6.4, PIP: 10            PHYSICAL EXAM:  General: Calm  Neurological: A/Ox3 by nodding and mouthing, no neglect, comprehension intact  CN: PERRL 3mm, EOMI and no ptosis bilaterally, face symmetric  Mot: bulk normal, tone normal, power 4/5 in bilateral upper and 5/5 in bilateral lower extremities  HEENT: +trach in place.   Pulmonary: Diminshed breath sounds L>R  Cardiovascular: S1, S2, RRR  Gastrointestinal: Soft, nontender, distended   : Campuzano in place;  Extremities: No calf tenderness   Incision: CDI no hematoma, DP +w/ doppler       MEDICATIONS:  acetaminophen     Tablet .. 650 milliGRAM(s) Oral every 6 hours PRN  atorvastatin 20 milliGRAM(s) Oral at bedtime  chlorhexidine 0.12% Liquid 15 milliLiter(s) Oral Mucosa every 12 hours  chlorhexidine 2% Cloths 1 Application(s) Topical <User Schedule>  enoxaparin Injectable 40 milliGRAM(s) SubCutaneous <User Schedule>  fluticasone propionate 50 MICROgram(s)/spray Nasal Spray 1 Spray(s) Both Nostrils two times a day  insulin glargine Injectable (LANTUS) 10 Unit(s) SubCutaneous at bedtime  insulin regular  human corrective regimen sliding scale   SubCutaneous every 6 hours  levETIRAcetam  IVPB 750 milliGRAM(s) IV Intermittent every 12 hours  magnesium sulfate  IVPB 2 Gram(s) IV Intermittent once  norepinephrine Infusion 0.05 MICROgram(s)/kG/Min (6.68 mL/Hr) IV Continuous <Continuous>  ondansetron Injectable 4 milliGRAM(s) IV Push every 8 hours PRN  pantoprazole  Injectable 40 milliGRAM(s) IV Push daily  potassium chloride   Powder 40 milliEquivalent(s) Oral once  sodium chloride 0.9% lock flush 10 milliLiter(s) IV Push every 1 hour PRN  sodium phosphate 15 milliMole(s)/250 mL IVPB 15 milliMole(s) IV Intermittent once               LABS:              10.4   7.50  )-----------( 76       ( 10 Mar 2023 05:45 )             30.1     03-10    126<L>  |  92<L>  |  15  ----------------------------<  150<H>  3.7   |  29  |  0.69    Ca    8.7      10 Mar 2023 05:45  Phos  2.6     03-10  Mg     1.8     03-10        ABG - ( 10 Mar 2023 05:32 )  pH, Arterial: 7.49  pH, Blood: x     /  pCO2: 40    /  pO2: 114   / HCO3: 30    / Base Excess: 6.6   /  SaO2: 98.8          Culture - Blood (collected 03-08-23 @ 17:20)  Source: .Blood Blood  Preliminary Report (03-09-23 @ 06:01):    No growth at 12 hours    Culture - Blood (collected 03-08-23 @ 17:20)  Source: .Blood Blood  Preliminary Report (03-09-23 @ 06:01):    No growth at 12 hours                                  INTERVAL HISTORY: HPI:  67 y/o right handed M with PMH of T2DM, HLD, JAGDISH, hyponatremia, tracheal stenosis s/p tracheostomy, and glioblastoma presents for cerebral angiogram with Avastin treatment.  Daughter states in January 2022, patient had onset of confusion, dizziness, and speech difficulty while in [place] and was diagnosed with brain mass. Pt evaluated upon return to the [place] at [facility] ED with CT head revealing left frontal brain mass. Pt underwent resection of left frontal enhancing tumor with GLEOLAN placed 1/27/2022. Path showed gliosarcoma, WHO grade IV, IDH wild-type, EGFR non amplified, MGMT promotor methylated. Pt was discharged to rehab and underwent radiation treatment and Temodar chemotherapy (completed 12/22). MRI 12/22 showed recurrence of brain mass. Pt presented 3/7 referred by Dr. Mackenzie for trial participation cerebral angiogram with Avastin treatment.    Pt complains of dizziness. Denies headache, nausea, vomiting, weakness, numbness, chest pain, dyspnea.   (07 Mar 2023 12:30)    24HOUR EVENTS:   Shock improving. Weaning pressors. Follows commands.     HOSP COURSE:   3/7: POD 0 s/p cerebral angiogram IA avastin. Pt hypertensive after being given neosynephrine. Developed flash pulmonary edema w/ desaturation, given diuretics.    3/8: POD1. Overnight, new global aphasia, right side only withdrawing to noxious stim.  CTH/CTA/CTP performed. No LVO. Severe metabolic lactic acidosis, pulmonary edema, hyponatremia and fluid overload. Started on bicarb gtt. Diuresced.   Evidence of DKA-> on insulin gtt.   POCUS this am; B lines L>R, severely hypokinetic left ventricle. Concern for cardiogenic shock. Cardiology consulted stat.   3/9: Shock improving. Weaning pressors. Follows commands.       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  Type 2 diabetes mellitus  Hyperlipidemia  Iron deficiency anemia  Nephrolithiasis  Thrombocytopenia  Hyponatremia  BPH (benign prostatic hyperplasia)  S/P craniotomy  H/O tracheostomy    REVIEW OF SYSTEMS: [x] Unable to Assess due to neurologic exam   [ ] All ROS addressed below are non-contributory, except:  Neuro: [ ] Headache [ ] Back pain [ ] Numbness [ ] Weakness [ ] Ataxia [ ] Dizziness [ ] Aphasia [ ] Dysarthria [ ] Visual disturbance  Resp: [ ] Shortness of breath/dyspnea, [ ] Orthopnea [ ] Cough  CV: [ ] Chest pain [ ] Palpitation [ ] Lightheadedness [ ] Syncope  Renal: [ ] Thirst [ ] Edema  GI: [ ] Nausea [ ] Emesis [ ] Abdominal pain [ ] Constipation [ ] Diarrhea  Hem: [ ] Hematemesis [ ] bright red blood per rectum  ID: [ ] Fever [ ] Chills [ ] Dysuria  ENT: [ ] Rhinorrhea      ICU Vital Signs Last 24 Hrs  T(C): 37.8 (10 Mar 2023 05:27), Max: 38.1 (09 Mar 2023 16:57)  T(F): 100 (10 Mar 2023 05:27), Max: 100.6 (09 Mar 2023 16:57)  HR: 87 (10 Mar 2023 06:00) (77 - 111)  BP: 97/65 (09 Mar 2023 21:00) (97/65 - 97/65)  BP(mean): 76 (09 Mar 2023 21:00) (76 - 76)  ABP: 115/58 (10 Mar 2023 06:00) (95/49 - 118/62)  ABP(mean): 76 (10 Mar 2023 06:00) (65 - 82)  RR: 19 (10 Mar 2023 06:00) (10 - 23)  SpO2: 93% (10 Mar 2023 06:00) (93% - 100%)      03-09-23 @ 07:01  -  03-10-23 @ 07:00  --------------------------------------------------------  IN: 884.1 mL / OUT: 1625 mL / NET: -740.9 mL      Mode: CPAP with PS, FiO2: 40, PEEP: 5, PS: 5, MAP: 6.4, PIP: 10      PHYSICAL EXAM:  General: Calm  Neurological: A/Ox3 by nodding and mouthing, no neglect, comprehension intact  CN: PERRL 3mm, EOMI and no ptosis bilaterally, face symmetric  Mot: bulk normal, tone normal, power 5/5 in bilateral upper and 5/5 in bilateral lower extremities  HEENT: +trach in place.   Pulmonary: Diminshed breath sounds L>R  Cardiovascular: S1, S2, RRR  Gastrointestinal: Soft, nontender, distended   : Campuzano in place;  Extremities: No calf tenderness   Incision: CDI no hematoma, DP +w/ doppler       MEDICATIONS:  acetaminophen     Tablet .. 650 milliGRAM(s) Oral every 6 hours PRN  atorvastatin 20 milliGRAM(s) Oral at bedtime  chlorhexidine 0.12% Liquid 15 milliLiter(s) Oral Mucosa every 12 hours  chlorhexidine 2% Cloths 1 Application(s) Topical <User Schedule>  enoxaparin Injectable 40 milliGRAM(s) SubCutaneous <User Schedule>  fluticasone propionate 50 MICROgram(s)/spray Nasal Spray 1 Spray(s) Both Nostrils two times a day  insulin glargine Injectable (LANTUS) 10 Unit(s) SubCutaneous at bedtime  insulin regular  human corrective regimen sliding scale   SubCutaneous every 6 hours  levETIRAcetam  IVPB 750 milliGRAM(s) IV Intermittent every 12 hours  magnesium sulfate  IVPB 2 Gram(s) IV Intermittent once  norepinephrine Infusion 0.05 MICROgram(s)/kG/Min (6.68 mL/Hr) IV Continuous <Continuous>  ondansetron Injectable 4 milliGRAM(s) IV Push every 8 hours PRN  pantoprazole  Injectable 40 milliGRAM(s) IV Push daily  potassium chloride   Powder 40 milliEquivalent(s) Oral once  sodium chloride 0.9% lock flush 10 milliLiter(s) IV Push every 1 hour PRN  sodium phosphate 15 milliMole(s)/250 mL IVPB 15 milliMole(s) IV Intermittent once               LABS:              10.4   7.50  )-----------( 76       ( 10 Mar 2023 05:45 )             30.1     03-10    126<L>  |  92<L>  |  15  ----------------------------<  150<H>  3.7   |  29  |  0.69    Ca    8.7      10 Mar 2023 05:45  Phos  2.6     03-10  Mg     1.8     03-10        ABG - ( 10 Mar 2023 05:32 )  pH, Arterial: 7.49  pH, Blood: x     /  pCO2: 40    /  pO2: 114   / HCO3: 30    / Base Excess: 6.6   /  SaO2: 98.8          Culture - Blood (collected 03-08-23 @ 17:20)  Source: .Blood Blood  Preliminary Report (03-09-23 @ 06:01):    No growth at 12 hours    Culture - Blood (collected 03-08-23 @ 17:20)  Source: .Blood Blood  Preliminary Report (03-09-23 @ 06:01):    No growth at 12 hours

## 2023-03-10 NOTE — PROGRESS NOTE ADULT - ATTENDING COMMENTS
have reviewed pt's dramatic improvement , both clinically and with resolution of metabolic abnormalities x persisitent  stable hypona.    agree with findings and summary of current status.  will be avail as needed for any issues, including if ser sodium again requires reevaluation. have reviewed pt's dramatic improvement , both clinically and with stabilization  of metabolic abnormalities x persisitent  stable hypona.    agree with findings and summary of current status.  will be avail as needed for any issues, including if ser sodium again requires reevaluation.

## 2023-03-10 NOTE — PHYSICAL THERAPY INITIAL EVALUATION ADULT - MODALITIES TREATMENT COMMENTS
smile, cheek puff, eyebrow raise: symmetrical. tongue protrusion: midline. visual tracking (H test): smooth pursuit.

## 2023-03-10 NOTE — OCCUPATIONAL THERAPY INITIAL EVALUATION ADULT - DIAGNOSIS, OT EVAL
Pt is s/p tracheostomy, and glioblastoma now s/p cerebral angiogram with Avastin treatment (3/7) presents with L side neglect, L sided weakness, decreased balance, and decreased activity tolerance impacting overall ease of completing ADLs and functional mobility tasks impacting overall ease of completing ADLs and functional mobility at [facility].

## 2023-03-10 NOTE — PROGRESS NOTE ADULT - ATTENDING COMMENTS
Initial attending contact date  3/8/23    . See fellow note written above for details. I reviewed the fellow documentation. I have personally seen and examined this patient. I reviewed vitals, labs, medications, cardiac studies, and additional imaging. I agree with the above fellow's findings and plans as written above with the following additions/statements.    69 y/o right handed M with PMH of T2DM, HLD, JAGDISH, hyponatremia, tracheal stenosis s/p tracheostomy, and glioblastoma s/p resection 1/2022 s/p radiation and chemo, admitted for cerebral angio with avastin tx due to recurrence of brain mass. Post op with HTN emergency leading to pulm edema  -Cardiology consulted for low EF noted on POCUS  -ECHO reviewed: severely depressed LVEF ~ 15-20% with akinetic/apical wall and basal inferior wall hyperkinesis. Mod TR with mild pulm HTN  -EKG post op NSR with new septal Qs and 1mm DAVID  -trop .32 now downtrending, BNP 6000  -Clinical picture along with EKG changes, mild trop elevation and ECHO findings all consistent with takotsubo CM  -Since takotsubo CM is diagnosis of exclusion , will need to r/o CAD if EF does not improve once clinical status improves  -Plan to repeat echo next week  -Clinically with sig improvement, alert awake following commands  -Decreased O2 requirements, plan to wean off vent today  -Wean off levo, plan to start metoprolol tartrate 12.5 mg po bid if BP can tolerate off levo

## 2023-03-10 NOTE — PROGRESS NOTE ADULT - ASSESSMENT
Assessment: 67 y/o M HTN, DM, chronic hyponatremia, POD 3  for IA Avastin for GBM.   Course complicated by cardiogenic shock/ takotsubo cardiomyopathy- likely secondary to hypertensive emergency in setting of neosynephrine  Resultant pulmonary edema  Severe lactic acidosis  DKA  Hyponatremia    NEURO: POD 3 s/p IA Avastin for GBM   Neurochecks Q1   CT/ CTA head & neck w/ perfusion. No LVO or evidence of perfusion deficit  Seizure disorder: Continue Keppra 750mg BID  EEG- negative. DCed  Obtain MRI brain w/ w/o contrast when stable  Analgesia: Tylenol prn.  Activity: PT/OT.     PULMONARY: Acute pulmonary edema. Likely secondary to cardiogenic shock  Trach to vent. CPAP with goal to trach collar 3/10  ABG, CXR  POCUS- see cardiovascular    CARDIOVASCULAR: Takotsubo cardiomopathy, shock, hypertensive emergency resulting in flash pulmonary edema- improvoing  ECHO: 3/8: Severely depressed LVEF ~ 15-20% with akinetic/apical wall and basal inferior wall hyperkinesis. Mod TR with mild pulm HTN  EKG: NSR with new septal Qs and 1mm DAVID  Shock improving. On low dose levophed.   Goal MAP >/=65.   Cardiology following  POCUS daily. Repeat formal TTE 3/13    GASTROENTEROLOGY:  Diet: TFs  GI ppx: Protonix 40mg qd  LBM: 3/9  LFTs wnl. Monitor     RENAL/: Profound hyponatremia; low serum osm &  urine osm, hyperlactatemia, severe metabolic acidosis  S/P 3%. DCed. Na 122-> 130  Na goal 125-130. Monitor BMPs  [facility]  Nephrology following; appreciate recommendations    ENDOCRINE: Diabetes Mellitus with DKA s/p insulin drip  On long acting insulin 10  A1c- 5.8, TSH- 1.2. Cortisol- 16    HEME/ONC: Thrombocytopenia & anemia- likely in setting of chronic disease & chemotherapy. New DVT present on admission. RLE IM calf.   DCT ppx: Lovenox 40mg  LLE SCD. None on R  Obtain repeat dopplers on 3/13.   Monitor CBC    INFECTIOUS: Fever noted 3/8  Leukocytosis improving  Blood culture NGTD  UA neg  Obtain sputum culture  Procalcitonin 0.3    MISC:    SOCIAL/FAMILY:  [] awaiting [x] updated at bedside [] family meeting  Contacted patient's daughter Roxanne by phone this am and informed her of patient's critical condition and overnight events.   Presented at the bedside with other siblings    CODE STATUS:  [x] Full Code [] DNR [] DNI [] Palliative/Comfort Care    DISPOSITION:  [x] ICU [] Stroke Unit [] Floor [] EMU [] RCU [] PCU    Time spent: 45 critical care minutes       Assessment: 67 y/o M HTN, DM, chronic hyponatremia, POD 3  for IA Avastin for GBM.   Course complicated by cardiogenic shock/ takotsubo cardiomyopathy- likely secondary to hypertensive emergency in setting of neosynephrine  Resultant pulmonary edema  Severe lactic acidosis  DKA  Hyponatremia    NEURO: POD 3 s/p IA Avastin for GBM   Neurochecks Q4  CT/ CTA head & neck w/ perfusion. No LVO or evidence of perfusion deficit  Seizure disorder: Continue Keppra 750mg BID  EEG- negative. DCed  Obtain MRI brain w/ w/o contrast when stable  Analgesia: Tylenol prn.  Activity: PT/OT.     PULMONARY: Acute pulmonary edema. Likely secondary to cardiogenic shock- improved  Trach to vent. CPAP with goal to trach collar 3/10.  ABG, CXR.    CARDIOVASCULAR: Takotsubo cardiomopathy, shock, hypertensive emergency resulting in flash pulmonary edema- improving  ECHO: 3/8: Severely depressed LVEF ~ 15-20% with akinetic/apical wall and basal inferior wall hyperkinesis. Mod TR with mild pulm HTN  EKG: NSR with new septal Qs and 1mm DAVID  Shock improving. On low dose levophed. Wean  Goal MAP >/=65.   Cardiology following  POCUS daily. Repeat formal TTE 3/13.   GDMT- will need beta blocker once stable.    GASTROENTEROLOGY: Diarrhea  Diet: TFs. Change from glucerna to other  GI ppx: Protonix 40mg qd-. sucralfate  LBM: 3/10- multiple  LFTs wnl. Monitor     RENAL/: Profound hyponatremia; low serum osm &  urine osm, hyperlactatemia, severe metabolic acidosis.   S/P 3%. DCed. Na 122-> 130  Na goal 125-130. Monitor BMPs/  [facility]  Nephrology following; appreciate recommendations  Albumin 250cc, normal saline 250cc.     ENDOCRINE: Diabetes Mellitus with DKA s/p insulin drip  On long acting insulin 10u  A1c- 5.8, TSH- 1.2. Cortisol- 16    HEME/ONC: Thrombocytopenia & anemia- likely in setting of chronic disease & chemotherapy. New DVT present on admission. RLE IM calf.   DCT ppx: Lovenox 40mg. Anti Xa level 3/11: 2:00am  LLE SCD. None on R  Obtain repeat dopplers on 3/13.   Monitor CBCs    INFECTIOUS: Fever noted 3/8-> resolved  Leukocytosis improving  Blood culture NGTD  UA neg    MISC:    SOCIAL/FAMILY:  [] awaiting [x] updated at bedside [] family meeting  Updating daughter daily.     CODE STATUS:  [x] Full Code [] DNR [] DNI [] Palliative/Comfort Care    DISPOSITION:  [x] ICU [] Stroke Unit [] Floor [] EMU [] RCU [] PCU    Time spent: 45 critical care minutes

## 2023-03-10 NOTE — OCCUPATIONAL THERAPY INITIAL EVALUATION ADULT - PLANNED THERAPY INTERVENTIONS, OT EVAL
ADL retraining/IADL retraining/balance training/bed mobility training/cognitive, visual perceptual/neuromuscular re-education/parent/caregiver training.../ROM/strengthening/transfer training

## 2023-03-10 NOTE — PROGRESS NOTE ADULT - SUBJECTIVE AND OBJECTIVE BOX
Cardiology Consult Service Progress Note     Toro Flores MD PANDA  Cardiology Fellow   Pager [phone]    Patient is a 68y old  Male who presents with a chief complaint of Cerebral angiogram with Avastin (09 Mar 2023 07:11). Hospital course complicated by pulmonary edema, lactic acidosis, DKA, and stress-induced cardiomyopathy    SUBJECTIVE/OVERNIGHT EVENTS   Tachycardic overnight - sinus  6 bowel movements as per primary team.     Patient seen and examined at bedside. Awake, alert, follows commands. Was weaned off of pressors and was on CPAP trial.      OBJECTIVE  Vital Signs Last 24 Hrs  T(C): 37.5 (09 Mar 2023 08:00), Max: 38.6 (08 Mar 2023 16:00)  T(F): 99.5 (09 Mar 2023 08:00), Max: 101.5 (08 Mar 2023 16:00)  HR: 95 (09 Mar 2023 08:00) (85 - 122)  RR: 18 (09 Mar 2023 08:00) (18 - 26)  SpO2: 100% (09 Mar 2023 08:00) (97% - 100%)    Parameters below as of 09 Mar 2023 08:00  Patient On (Oxygen Delivery Method): ventilator    O2 Concentration (%): 50    I&O's Summary    08 Mar 2023 07:01  -  09 Mar 2023 07:00  --------------------------------------------------------  IN: 1663 mL / OUT: 2015 mL / NET: -352 mL    09 Mar 2023 07:01  -  09 Mar 2023 08:33  --------------------------------------------------------  IN: 11.2 mL / OUT: 40 mL / NET: -28.8 mL    PHYSICAL EXAM:  GENERAL: NAD, well-developed  HEAD:  Atraumatic, Normocephalic  EYES: EOMI, conjunctiva and sclera clear  NECK: Supple, No JVD  CHEST/LUNG: Clear to auscultation bilaterally; No wheeze  HEART: Regular rate and rhythm; No murmurs, rubs, or gallops  ABDOMEN: Soft, Nontender, Nondistended; Bowel sounds present  EXTREMITIES:  2+ Peripheral Pulses, No clubbing, cyanosis, or edema  PSYCH: AAOx3  NEUROLOGY: non-focal  SKIN: No rashes or lesions    LABS                        11.7   10.76 )-----------( 105      ( 09 Mar 2023 05:13 )             33.3                         12.9   12.16 )-----------( 133      ( 08 Mar 2023 03:00 )             39.1     03-09    128<L>  |  92<L>  |  15  ----------------------------<  157<H>  4.1   |  27  |  0.70  03-08    126<L>  |  90<L>  |  15  ----------------------------<  181<H>  3.9   |  26  |  0.77    Ca    8.8      09 Mar 2023 05:13  Ca    8.6      08 Mar 2023 23:55  Phos  4.0     03-09  Mg     1.8     03-09    TPro  6.5  /  Alb  3.6  /  TBili  0.7  /  DBili  x   /  AST  27  /  ALT  16  /  AlkPhos  81  03-08    CAPILLARY BLOOD GLUCOSE  POCT Blood Glucose.: 149 mg/dL (09 Mar 2023 06:39)  POCT Blood Glucose.: 161 mg/dL (08 Mar 2023 23:24)  POCT Blood Glucose.: 179 mg/dL (08 Mar 2023 16:31)  POCT Blood Glucose.: 179 mg/dL (08 Mar 2023 14:42)  POCT Blood Glucose.: 166 mg/dL (08 Mar 2023 11:55)  POCT Blood Glucose.: 130 mg/dL (08 Mar 2023 10:53)  POCT Blood Glucose.: 109 mg/dL (08 Mar 2023 10:04)  POCT Blood Glucose.: 115 mg/dL (08 Mar 2023 09:05)    PT/INR - ( 07 Mar 2023 18:00 )   PT: 13.9 sec;   INR: 1.17     PTT - ( 07 Mar 2023 18:00 )  PTT:27.5 sec   08 Mar 2023 23:55 Troponin 0.13 ng/mL / CK x     / CKMB x     / CKMB Units x       08 Mar 2023 18:43 Troponin 0.16 ng/mL / CK x     / CKMB x     / CKMB Units x       08 Mar 2023 14:23 Troponin 0.18 ng/mL / CK x     / CKMB x     / CKMB Units x        ( 09 Mar 2023 05:10 ) pH: 7.47  /  pCO2: 38    /  pO2: 204   / HCO3: 28    / Base Excess: 3.9   /  SaO2: 100.0     ( 08 Mar 2023 23:55 ) pH: 7.47  /  pCO2: 39    /  pO2: 173   / HCO3: 28    / Base Excess: 4.5   /  SaO2: 99.0      ( 08 Mar 2023 17:30 ) pH: 7.51  /  pCO2: 31    /  pO2: 140   / HCO3: 25    / Base Excess: 2.3   /  SaO2: 99.2      23 @ 18:43 - VBG - pH: 7.40  | pCO2: 46    | pO2: 40    | Lactate:        23 @ 14:23 - VBG - pH: 7.42  | pCO2: 42    | pO2: 37    | Lactate:          Urinalysis Basic - ( 08 Mar 2023 17:08 )  Color: Yellow / Appearance: Clear / S.020 / pH: x  Gluc: x / Ketone: NEGATIVE  / Bili: Negative / Urobili: 1.0 E.U./dL   Blood: x / Protein: NEGATIVE mg/dL / Nitrite: NEGATIVE   Leuk Esterase: NEGATIVE / RBC: > 10 /HPF / WBC < 5 /HPF   Sq Epi: x / Non Sq Epi: 0-5 /HPF / Bacteria: Present /HPF    MEDICATIONS  (STANDING):  atorvastatin 20 milliGRAM(s) Oral at bedtime  chlorhexidine 0.12% Liquid 15 milliLiter(s) Oral Mucosa every 12 hours  chlorhexidine 2% Cloths 1 Application(s) Topical <User Schedule>  chlorhexidine 4% Liquid 1 Application(s) Topical once  enoxaparin Injectable 40 milliGRAM(s) SubCutaneous <User Schedule>  fluticasone propionate 50 MICROgram(s)/spray Nasal Spray 1 Spray(s) Both Nostrils two times a day  insulin regular  human corrective regimen sliding scale   SubCutaneous every 6 hours  levETIRAcetam  IVPB 750 milliGRAM(s) IV Intermittent every 12 hours  pantoprazole  Injectable 40 milliGRAM(s) IV Push daily  propofol Infusion 9.993 MICROgram(s)/kG/Min (4.27 mL/Hr) IV Continuous <Continuous>  senna 2 Tablet(s) Oral at bedtime    MEDICATIONS  (PRN):  acetaminophen     Tablet .. 650 milliGRAM(s) Oral every 6 hours PRN Mild Pain (1 - 3)  ondansetron Injectable 4 milliGRAM(s) IV Push every 8 hours PRN Nausea and/or Vomiting  polyethylene glycol 3350 17 Gram(s) Oral every 12 hours PRN Constipation  sodium chloride 0.9% lock flush 10 milliLiter(s) IV Push every 1 hour PRN Pre/post blood products, medications, blood draw, and to maintain line patency Cardiology Consult Service Progress Note     Patient is a 68y old  Male who presents with a chief complaint of Cerebral angiogram with Avastin (09 Mar 2023 07:11). Hospital course complicated by pulmonary edema, lactic acidosis, DKA, and stress-induced cardiomyopathy    SUBJECTIVE/OVERNIGHT EVENTS   Tachycardic overnight - sinus  6 bowel movements as per primary team.     Patient seen and examined at bedside. Awake, alert, follows commands. Was weaned off of pressors and was on CPAP trial.      OBJECTIVE  Vital Signs Last 24 Hrs  T(C): 37.5 (09 Mar 2023 08:00), Max: 38.6 (08 Mar 2023 16:00)  T(F): 99.5 (09 Mar 2023 08:00), Max: 101.5 (08 Mar 2023 16:00)  HR: 95 (09 Mar 2023 08:00) (85 - 122)  RR: 18 (09 Mar 2023 08:00) (18 - 26)  SpO2: 100% (09 Mar 2023 08:00) (97% - 100%)    Parameters below as of 09 Mar 2023 08:00  Patient On (Oxygen Delivery Method): ventilator    O2 Concentration (%): 50    I&O's Summary    08 Mar 2023 07:01  -  09 Mar 2023 07:00  --------------------------------------------------------  IN: 1663 mL / OUT: 2015 mL / NET: -352 mL    09 Mar 2023 07:01  -  09 Mar 2023 08:33  --------------------------------------------------------  IN: 11.2 mL / OUT: 40 mL / NET: -28.8 mL    PHYSICAL EXAM:  GENERAL: NAD, well-developed  HEAD:  Atraumatic, Normocephalic  EYES: EOMI, conjunctiva and sclera clear  NECK: Supple, No JVD  CHEST/LUNG: Clear to auscultation bilaterally; No wheeze  HEART: Regular rate and rhythm; No murmurs, rubs, or gallops  ABDOMEN: Soft, Nontender, Nondistended; Bowel sounds present  EXTREMITIES:  2+ Peripheral Pulses, No clubbing, cyanosis, or edema  PSYCH: AAOx3  NEUROLOGY: non-focal  SKIN: No rashes or lesions    LABS                        11.7   10.76 )-----------( 105      ( 09 Mar 2023 05:13 )             33.3                         12.9   12.16 )-----------( 133      ( 08 Mar 2023 03:00 )             39.1     03-09    128<L>  |  92<L>  |  15  ----------------------------<  157<H>  4.1   |  27  |  0.70  03-08    126<L>  |  90<L>  |  15  ----------------------------<  181<H>  3.9   |  26  |  0.77    Ca    8.8      09 Mar 2023 05:13  Ca    8.6      08 Mar 2023 23:55  Phos  4.0     03-09  Mg     1.8     03-09    TPro  6.5  /  Alb  3.6  /  TBili  0.7  /  DBili  x   /  AST  27  /  ALT  16  /  AlkPhos  81  03-08    CAPILLARY BLOOD GLUCOSE  POCT Blood Glucose.: 149 mg/dL (09 Mar 2023 06:39)  POCT Blood Glucose.: 161 mg/dL (08 Mar 2023 23:24)  POCT Blood Glucose.: 179 mg/dL (08 Mar 2023 16:31)  POCT Blood Glucose.: 179 mg/dL (08 Mar 2023 14:42)  POCT Blood Glucose.: 166 mg/dL (08 Mar 2023 11:55)  POCT Blood Glucose.: 130 mg/dL (08 Mar 2023 10:53)  POCT Blood Glucose.: 109 mg/dL (08 Mar 2023 10:04)  POCT Blood Glucose.: 115 mg/dL (08 Mar 2023 09:05)    PT/INR - ( 07 Mar 2023 18:00 )   PT: 13.9 sec;   INR: 1.17     PTT - ( 07 Mar 2023 18:00 )  PTT:27.5 sec   08 Mar 2023 23:55 Troponin 0.13 ng/mL / CK x     / CKMB x     / CKMB Units x       08 Mar 2023 18:43 Troponin 0.16 ng/mL / CK x     / CKMB x     / CKMB Units x       08 Mar 2023 14:23 Troponin 0.18 ng/mL / CK x     / CKMB x     / CKMB Units x        ( 09 Mar 2023 05:10 ) pH: 7.47  /  pCO2: 38    /  pO2: 204   / HCO3: 28    / Base Excess: 3.9   /  SaO2: 100.0     ( 08 Mar 2023 23:55 ) pH: 7.47  /  pCO2: 39    /  pO2: 173   / HCO3: 28    / Base Excess: 4.5   /  SaO2: 99.0      ( 08 Mar 2023 17:30 ) pH: 7.51  /  pCO2: 31    /  pO2: 140   / HCO3: 25    / Base Excess: 2.3   /  SaO2: 99.2      23 @ 18:43 - VBG - pH: 7.40  | pCO2: 46    | pO2: 40    | Lactate:        23 @ 14:23 - VBG - pH: 7.42  | pCO2: 42    | pO2: 37    | Lactate:          Urinalysis Basic - ( 08 Mar 2023 17:08 )  Color: Yellow / Appearance: Clear / S.020 / pH: x  Gluc: x / Ketone: NEGATIVE  / Bili: Negative / Urobili: 1.0 E.U./dL   Blood: x / Protein: NEGATIVE mg/dL / Nitrite: NEGATIVE   Leuk Esterase: NEGATIVE / RBC: > 10 /HPF / WBC < 5 /HPF   Sq Epi: x / Non Sq Epi: 0-5 /HPF / Bacteria: Present /HPF    MEDICATIONS  (STANDING):  atorvastatin 20 milliGRAM(s) Oral at bedtime  chlorhexidine 0.12% Liquid 15 milliLiter(s) Oral Mucosa every 12 hours  chlorhexidine 2% Cloths 1 Application(s) Topical <User Schedule>  chlorhexidine 4% Liquid 1 Application(s) Topical once  enoxaparin Injectable 40 milliGRAM(s) SubCutaneous <User Schedule>  fluticasone propionate 50 MICROgram(s)/spray Nasal Spray 1 Spray(s) Both Nostrils two times a day  insulin regular  human corrective regimen sliding scale   SubCutaneous every 6 hours  levETIRAcetam  IVPB 750 milliGRAM(s) IV Intermittent every 12 hours  pantoprazole  Injectable 40 milliGRAM(s) IV Push daily  propofol Infusion 9.993 MICROgram(s)/kG/Min (4.27 mL/Hr) IV Continuous <Continuous>  senna 2 Tablet(s) Oral at bedtime    MEDICATIONS  (PRN):  acetaminophen     Tablet .. 650 milliGRAM(s) Oral every 6 hours PRN Mild Pain (1 - 3)  ondansetron Injectable 4 milliGRAM(s) IV Push every 8 hours PRN Nausea and/or Vomiting  polyethylene glycol 3350 17 Gram(s) Oral every 12 hours PRN Constipation  sodium chloride 0.9% lock flush 10 milliLiter(s) IV Push every 1 hour PRN Pre/post blood products, medications, blood draw, and to maintain line patency

## 2023-03-10 NOTE — OCCUPATIONAL THERAPY INITIAL EVALUATION ADULT - PERTINENT HX OF CURRENT PROBLEM, REHAB EVAL
69 y/o right handed male with pmhx of T2DM, HLD, JAGDISH, hyponatremia, tracheal stenosis s/p tracheostomy, and glioblastoma presents for cerebral angiogram with Avastin treatment.

## 2023-03-10 NOTE — PROGRESS NOTE ADULT - SUBJECTIVE AND OBJECTIVE BOX
PM EVENTS: no acute overnight events as of documentation time of this note.    ROS: negative except as mentioned above.      ICU Vital Signs Last 24 Hrs  T(C): 38.4 (10 Mar 2023 14:00), Max: 38.4 (10 Mar 2023 14:00)  T(F): 101.1 (10 Mar 2023 14:00), Max: 101.1 (10 Mar 2023 14:00)  HR: 107 (10 Mar 2023 18:00) (77 - 128)  BP: 97/65 (09 Mar 2023 21:00) (97/65 - 97/65)  BP(mean): 76 (09 Mar 2023 21:00) (76 - 76)  ABP: 113/59 (10 Mar 2023 18:00) (95/49 - 190/133)  ABP(mean): 75 (10 Mar 2023 18:00) (65 - 150)  RR: 20 (10 Mar 2023 18:00) (12 - 28)  SpO2: 92% (10 Mar 2023 18:00) (85% - 100%)    O2 Parameters below as of 10 Mar 2023 18:00  Patient On (Oxygen Delivery Method): ventilator,CPAP    O2 Concentration (%): 40        EXAM:     [place] Coma Scale: 14    General: normocephalic, laying in bed, trach to vent, in no distress  Neuro     MS: A/Ox3 (to 'hospital' and appropriate month), cooperative with examination, bradyphrenia, no neglect, comprehension intact,    CN: PERRL, EOMI and no ptosis bilaterally, face symmetric, hearing grossly intact    Mot: bulk normal, tone normal, power 4/5 in bilateral upper and 5/5 in bilateral lower ext    Sens: intact to noxious stimuli in bilateral upper and lower ext  HEENT: tracheostomy to ventilator  Chest: nonlabored respirations, no adventitious lung sounds bilaterally, heart regular rate/rhythm, present S1/S2, no murmurs or rubs  Abdomen: nondistended, soft and nontender without peritoneal signs, normoactive bowel sounds  Extremities: no clubbing, well-perfused, no edema        ABG - ( 10 Mar 2023 05:32 )  pH, Arterial: 7.49  pH, Blood: x     /  pCO2: 40    /  pO2: 114   / HCO3: 30    / Base Excess: 6.6   /  SaO2: 98.8                                    10.4   7.50  )-----------( 76       ( 10 Mar 2023 05:45 )             30.1     03-10    131<L>  |  96  |  15  ----------------------------<  149<H>  3.8   |  27  |  0.64    Ca    8.2<L>      10 Mar 2023 14:27  Phos  3.0     03-10  Mg     2.0     03-10        MEDICATIONS  (STANDING):  atorvastatin 20 milliGRAM(s) Oral at bedtime  chlorhexidine 0.12% Liquid 15 milliLiter(s) Oral Mucosa every 12 hours  chlorhexidine 2% Cloths 1 Application(s) Topical <User Schedule>  enoxaparin Injectable 40 milliGRAM(s) SubCutaneous <User Schedule>  fluticasone propionate 50 MICROgram(s)/spray Nasal Spray 1 Spray(s) Both Nostrils two times a day  insulin glargine Injectable (LANTUS) 10 Unit(s) SubCutaneous at bedtime  insulin regular  human corrective regimen sliding scale   SubCutaneous every 6 hours  levETIRAcetam  IVPB 750 milliGRAM(s) IV Intermittent every 12 hours  sucralfate 1 Gram(s) Oral every 12 hours    MEDICATIONS  (PRN):  acetaminophen     Tablet .. 650 milliGRAM(s) Oral every 6 hours PRN Temp greater or equal to 38C (100.4F), Mild Pain (1 - 3)  albuterol/ipratropium for Nebulization 3 milliLiter(s) Nebulizer every 6 hours PRN Shortness of Breath and/or Wheezing  ondansetron Injectable 4 milliGRAM(s) IV Push every 8 hours PRN Nausea and/or Vomiting  sodium chloride 0.9% lock flush 10 milliLiter(s) IV Push every 1 hour PRN Pre/post blood products, medications, blood draw, and to maintain line patency      Please see the day's note documented by Dr. Hendrix for detailed ongoing assessment and plan.

## 2023-03-11 NOTE — CHART NOTE - NSCHARTNOTEFT_GEN_A_CORE
3/11: POD4 Episode of O2 desaturation to low 90s while on 40% FiO2. FiO2 increased to 60% briefly with improvement in O2, good breath sounds throughout, Pt denies SOB and CXR stable. Neuro exam stable. CXR with worsening congestion, given Lasix 40mg IV, on full vent support. Heme consulted for thrombocytopenia. Fever 101F. Episode of SVT with HR to 180s and hypotensive to 80s. Started on Steven, EKG and IV tylenol. Cards fellow called to bedside. Lopressor 5mg IV given with good resolution. Vital signs normalized. Patient remained neuro intact.

## 2023-03-11 NOTE — PROGRESS NOTE ADULT - SUBJECTIVE AND OBJECTIVE BOX
INTERVAL HISTORY: HPI:  69 y/o right handed M with PMH of T2DM, HLD, JAGDISH, hyponatremia, tracheal stenosis s/p tracheostomy, and glioblastoma presents for cerebral angiogram with Avastin treatment.  Daughter states in January 2022, patient had onset of confusion, dizziness, and speech difficulty while in [place] and was diagnosed with brain mass. Pt evaluated upon return to the [place] at [facility] ED with CT head revealing left frontal brain mass. Pt underwent resection of left frontal enhancing tumor with GLEOLAN placed 1/27/2022. Path showed gliosarcoma, WHO grade IV, IDH wild-type, EGFR non amplified, MGMT promotor methylated. Pt was discharged to rehab and underwent radiation treatment and Temodar chemotherapy (completed 12/22). MRI 12/22 showed recurrence of brain mass. Pt presented 3/7 referred by Dr. Mackenzie for trial participation cerebral angiogram with Avastin treatment.    Pt complains of dizziness. Denies headache, nausea, vomiting, weakness, numbness, chest pain, dyspnea.   (07 Mar 2023 12:30)    24HOUR EVENTS:   Shock improving. Weaning pressors. Follows commands.     HOSP COURSE:   3/7: POD 0 s/p cerebral angiogram IA avastin. Pt hypertensive after being given neosynephrine. Developed flash pulmonary edema w/ desaturation, given diuretics.    3/8: POD1. Overnight, new global aphasia, right side only withdrawing to noxious stim.  CTH/CTA/CTP performed. No LVO. Severe metabolic lactic acidosis, pulmonary edema, hyponatremia and fluid overload. Started on bicarb gtt. Diuresced.   Evidence of DKA-> on insulin gtt.   POCUS this am; B lines L>R, severely hypokinetic left ventricle. Concern for cardiogenic shock. Cardiology consulted stat.   3/9: Shock improving. Weaning pressors. Follows commands.   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  Type 2 diabetes mellitus  Hyperlipidemia  Iron deficiency anemia  Nephrolithiasis  Thrombocytopenia  Hyponatremia  BPH (benign prostatic hyperplasia)  S/P craniotomy  H/O tracheostomy    REVIEW OF SYSTEMS: [x] Unable to Assess due to neurologic exam   [ ] All ROS addressed below are non-contributory, except:  Neuro: [ ] Headache [ ] Back pain [ ] Numbness [ ] Weakness [ ] Ataxia [ ] Dizziness [ ] Aphasia [ ] Dysarthria [ ] Visual disturbance  Resp: [ ] Shortness of breath/dyspnea, [ ] Orthopnea [ ] Cough  CV: [ ] Chest pain [ ] Palpitation [ ] Lightheadedness [ ] Syncope  Renal: [ ] Thirst [ ] Edema  GI: [ ] Nausea [ ] Emesis [ ] Abdominal pain [ ] Constipation [ ] Diarrhea  Hem: [ ] Hematemesis [ ] bright red blood per rectum  ID: [ ] Fever [ ] Chills [ ] Dysuria  ENT: [ ] Rhinorrhea      ICU Vital Signs Last 24 Hrs  T(C): 37.6 (11 Mar 2023 06:12), Max: 38.4 (10 Mar 2023 14:00)  T(F): 99.6 (11 Mar 2023 06:12), Max: 101.1 (10 Mar 2023 14:00)  HR: 110 (11 Mar 2023 06:17) (96 - 128)  BP: 107/69 (11 Mar 2023 05:00) (91/59 - 111/66)  BP(mean): 83 (11 Mar 2023 05:00) (70 - 83)  ABP: 151/72 (11 Mar 2023 06:17) (108/58 - 190/133)  ABP(mean): 92 (11 Mar 2023 06:17) (70 - 150)  RR: 22 (11 Mar 2023 06:17) (15 - 28)  SpO2: 95% (11 Mar 2023 06:17) (85% - 100%)      03-09-23 @ 07:01  -  03-10-23 @ 07:00  --------------------------------------------------------  IN: 884.1 mL / OUT: 1625 mL / NET: -740.9 mL    03-10-23 @ 07:01  -  03-11-23 @ 06:48  --------------------------------------------------------  IN: 1091 mL / OUT: 625 mL / NET: 466 mL      Mode: AC/ CMV (Assist Control/ Continuous Mandatory Ventilation), RR (machine): 12, TV (machine): 400, FiO2: 55, PEEP: 5, ITime: 1, MAP: 6, PIP: 15      PHYSICAL EXAM:  General: Calm  Neurological: A/Ox3 by nodding and mouthing, no neglect, comprehension intact  CN: PERRL 3mm, EOMI and no ptosis bilaterally, face symmetric  Mot: bulk normal, tone normal, power 5/5 in bilateral upper and 5/5 in bilateral lower extremities  HEENT: +trach in place.   Pulmonary: Diminshed breath sounds L>R  Cardiovascular: S1, S2, RRR  Gastrointestinal: Soft, nontender, distended   Extremities: No calf tenderness   Incision: CDI no hematoma, DP +w/ doppler       MEDICATIONS:   acetaminophen     Tablet .. 650 milliGRAM(s) Oral every 6 hours PRN  albuterol/ipratropium for Nebulization 3 milliLiter(s) Nebulizer every 6 hours PRN  atorvastatin 20 milliGRAM(s) Oral at bedtime  chlorhexidine 0.12% Liquid 15 milliLiter(s) Oral Mucosa every 12 hours  chlorhexidine 2% Cloths 1 Application(s) Topical <User Schedule>  enoxaparin Injectable 40 milliGRAM(s) SubCutaneous <User Schedule>  fluticasone propionate 50 MICROgram(s)/spray Nasal Spray 1 Spray(s) Both Nostrils two times a day  insulin glargine Injectable (LANTUS) 10 Unit(s) SubCutaneous at bedtime  insulin regular  human corrective regimen sliding scale   SubCutaneous every 6 hours  levETIRAcetam  IVPB 750 milliGRAM(s) IV Intermittent every 12 hours  ondansetron Injectable 4 milliGRAM(s) IV Push every 8 hours PRN  sodium chloride 0.9% lock flush 10 milliLiter(s) IV Push every 1 hour PRN  sucralfate 1 Gram(s) Oral every 12 hours                          9.7    6.47  )-----------( 63       ( 11 Mar 2023 02:32 )             28.0     03-11    131<L>  |  95<L>  |  14  ----------------------------<  152<H>  4.1   |  28  |  0.62    Ca    8.2<L>      11 Mar 2023 02:32  Phos  2.5     03-11  Mg     2.0     03-11        ABG - ( 10 Mar 2023 05:32 )  pH, Arterial: 7.49  pH, Blood: x     /  pCO2: 40    /  pO2: 114   / HCO3: 30    / Base Excess: 6.6   /  SaO2: 98.8        Culture - Blood (collected 03-08-23 @ 17:20)  Source: .Blood Blood  Preliminary Report (03-09-23 @ 06:01):    No growth at 12 hours    Culture - Blood (collected 03-08-23 @ 17:20)  Source: .Blood Blood  Preliminary Report (03-09-23 @ 06:01):    No growth at 12 hours                                  INTERVAL HISTORY: HPI:  67 y/o right handed M with PMH of T2DM, HLD, JAGDISH, hyponatremia, tracheal stenosis s/p tracheostomy, and glioblastoma presents for cerebral angiogram with Avastin treatment.  Daughter states in January 2022, patient had onset of confusion, dizziness, and speech difficulty while in [place] and was diagnosed with brain mass. Pt evaluated upon return to the [place] at [facility] ED with CT head revealing left frontal brain mass. Pt underwent resection of left frontal enhancing tumor with GLEOLAN placed 1/27/2022. Path showed gliosarcoma, WHO grade IV, IDH wild-type, EGFR non amplified, MGMT promotor methylated. Pt was discharged to rehab and underwent radiation treatment and Temodar chemotherapy (completed 12/22). MRI 12/22 showed recurrence of brain mass. Pt presented 3/7 referred by Dr. Mackenzie for trial participation cerebral angiogram with Avastin treatment.    Pt complains of dizziness. Denies headache, nausea, vomiting, weakness, numbness, chest pain, dyspnea.   (07 Mar 2023 12:30)    24HOUR EVENTS:   Shock improving. Weaning pressors. Follows commands.     HOSP COURSE:   3/7: POD 0 s/p cerebral angiogram IA avastin. Pt hypertensive after being given neosynephrine. Developed flash pulmonary edema w/ desaturation, given diuretics.    3/8: POD1. Overnight, new global aphasia, right side only withdrawing to noxious stim.  CTH/CTA/CTP performed. No LVO. Severe metabolic lactic acidosis, pulmonary edema, hyponatremia and fluid overload. Started on bicarb gtt. Diuresced.   Evidence of DKA-> on insulin gtt.   POCUS this am; B lines L>R, severely hypokinetic left ventricle. Concern for cardiogenic shock. Cardiology consulted stat.   3/9: Shock improving. Weaning pressors. Follows commands.   3/10: Trialed trach collar- hypoxic in evening. 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  Type 2 diabetes mellitus  Hyperlipidemia  Iron deficiency anemia  Nephrolithiasis  Thrombocytopenia  Hyponatremia  BPH (benign prostatic hyperplasia)  S/P craniotomy  H/O tracheostomy    REVIEW OF SYSTEMS: [x] Unable to Assess due to neurologic exam   [ ] All ROS addressed below are non-contributory, except:  Neuro: [ ] Headache [ ] Back pain [ ] Numbness [ ] Weakness [ ] Ataxia [ ] Dizziness [ ] Aphasia [ ] Dysarthria [ ] Visual disturbance  Resp: [ ] Shortness of breath/dyspnea, [ ] Orthopnea [ ] Cough  CV: [ ] Chest pain [ ] Palpitation [ ] Lightheadedness [ ] Syncope  Renal: [ ] Thirst [ ] Edema  GI: [ ] Nausea [ ] Emesis [ ] Abdominal pain [ ] Constipation [ ] Diarrhea  Hem: [ ] Hematemesis [ ] bright red blood per rectum  ID: [ ] Fever [ ] Chills [ ] Dysuria  ENT: [ ] Rhinorrhea      ICU Vital Signs Last 24 Hrs  T(C): 37.6 (11 Mar 2023 06:12), Max: 38.4 (10 Mar 2023 14:00)  T(F): 99.6 (11 Mar 2023 06:12), Max: 101.1 (10 Mar 2023 14:00)  HR: 110 (11 Mar 2023 06:17) (96 - 128)  BP: 107/69 (11 Mar 2023 05:00) (91/59 - 111/66)  BP(mean): 83 (11 Mar 2023 05:00) (70 - 83)  ABP: 151/72 (11 Mar 2023 06:17) (108/58 - 190/133)  ABP(mean): 92 (11 Mar 2023 06:17) (70 - 150)  RR: 22 (11 Mar 2023 06:17) (15 - 28)  SpO2: 95% (11 Mar 2023 06:17) (85% - 100%)      03-09-23 @ 07:01  -  03-10-23 @ 07:00  --------------------------------------------------------  IN: 884.1 mL / OUT: 1625 mL / NET: -740.9 mL    03-10-23 @ 07:01  -  03-11-23 @ 06:48  --------------------------------------------------------  IN: 1091 mL / OUT: 625 mL / NET: 466 mL      Mode: AC/ CMV (Assist Control/ Continuous Mandatory Ventilation), RR (machine): 12, TV (machine): 400, FiO2: 55, PEEP: 8, ITime: 1, MAP: 6, PIP: 15      PHYSICAL EXAM:  General: Calm  Neurological: A/Ox3 by nodding and mouthing, no neglect, comprehension intact  CN: PERRL 3mm, EOMI and no ptosis bilaterally, face symmetric  Motor: Power 5/5 in bilateral upper and 5/5 in bilateral lower extremities  HEENT: +Trach in place.   Pulmonary: Diminshed breath sounds L>R  Cardiovascular: S1, S2, RRR  Gastrointestinal: Soft, nontender, distended   Extremities: No calf tenderness   Incision: CDI no hematoma, DP +w/ doppler       MEDICATIONS:   acetaminophen     Tablet .. 650 milliGRAM(s) Oral every 6 hours PRN  albuterol/ipratropium for Nebulization 3 milliLiter(s) Nebulizer every 6 hours PRN  atorvastatin 20 milliGRAM(s) Oral at bedtime  chlorhexidine 0.12% Liquid 15 milliLiter(s) Oral Mucosa every 12 hours  chlorhexidine 2% Cloths 1 Application(s) Topical <User Schedule>  enoxaparin Injectable 40 milliGRAM(s) SubCutaneous <User Schedule>  fluticasone propionate 50 MICROgram(s)/spray Nasal Spray 1 Spray(s) Both Nostrils two times a day  insulin glargine Injectable (LANTUS) 10 Unit(s) SubCutaneous at bedtime  insulin regular  human corrective regimen sliding scale   SubCutaneous every 6 hours  levETIRAcetam  IVPB 750 milliGRAM(s) IV Intermittent every 12 hours  ondansetron Injectable 4 milliGRAM(s) IV Push every 8 hours PRN  sodium chloride 0.9% lock flush 10 milliLiter(s) IV Push every 1 hour PRN  sucralfate 1 Gram(s) Oral every 12 hours                          9.7    6.47  )-----------( 63       ( 11 Mar 2023 02:32 )             28.0     03-11    131<L>  |  95<L>  |  14  ----------------------------<  152<H>  4.1   |  28  |  0.62    Ca    8.2<L>      11 Mar 2023 02:32  Phos  2.5     03-11  Mg     2.0     03-11        ABG - ( 10 Mar 2023 05:32 )  pH, Arterial: 7.49  pH, Blood: x     /  pCO2: 40    /  pO2: 114   / HCO3: 30    / Base Excess: 6.6   /  SaO2: 98.8        Culture - Blood (collected 03-08-23 @ 17:20)  Source: .Blood Blood  Preliminary Report (03-09-23 @ 06:01):    No growth at 12 hours    Culture - Blood (collected 03-08-23 @ 17:20)  Source: .Blood Blood  Preliminary Report (03-09-23 @ 06:01):    No growth at 12 hours

## 2023-03-11 NOTE — PROGRESS NOTE ADULT - ASSESSMENT
Assessment: 67 y/o M HTN, DM, chronic hyponatremia, POD 4  for IA Avastin for GBM.   Course complicated by cardiogenic shock/ takotsubo cardiomyopathy- likely secondary to hypertensive emergency in setting of neosynephrine  Resultant pulmonary edema  Severe lactic acidosis  DKA  Hyponatremia    NEURO: POD 4 s/p IA Avastin for GBM   Neurochecks Q4  CT/ CTA head & neck w/ perfusion. No LVO or evidence of perfusion deficit  Seizure disorder: Continue Keppra 750mg BID  EEG- negative. DCed  Obtain MRI brain w/ w/o contrast when stable  Analgesia: Tylenol prn.  Activity: PT/OT.     PULMONARY: Acute pulmonary edema. Likely secondary to cardiogenic shock- improved  Trach to vent. CPAP with goal to trach collar 3/11  ABG, CXR.    CARDIOVASCULAR: Takotsubo cardiomopathy, shock, hypertensive emergency resulting in flash pulmonary edema- improving  ECHO: 3/8: Severely depressed LVEF ~ 15-20% with akinetic/apical wall and basal inferior wall hyperkinesis. Mod TR with mild pulm HTN  EKG: NSR with new septal Qs and 1mm DAVID  Shock resolved. Off pressors  Goal MAP >/=65.   Cardiology following  POCUS daily. Repeat formal TTE 3/13.   GDMT- will need beta blocker once stable.  Monitor need for further diuresis    GASTROENTEROLOGY: Diarrhea  Diet: TFs  GI ppx: Protonix 40mg qd- sucralfate  LBM: 3/10- multiple- rectal tube  LFTs wnl. Monitor     RENAL/: Profound hyponatremia; low serum osm &  urine osm, hyperlactatemia, severe metabolic acidosis. Improved.  Na goal 125-130. Monitor BMPs.   [place] cath; monitor Is/Os  Nephrology following; appreciate recommendations  Replete lytes prn.     ENDOCRINE: Diabetes Mellitus with DKA s/p insulin drip  On long acting insulin 10u  A1c- 5.8, TSH- 1.2. Cortisol- 16    HEME/ONC: Thrombocytopenia & anemia- likely in setting of chronic disease & chemotherapy. New DVT present on admission. RLE IM calf.   DCT ppx: Lovenox 40mg. Anti Xa level 0.23  LLE SCD. None on R  Obtain repeat dopplers on 3/13.   Monitor CBCs  Obtain heme consult    INFECTIOUS: Fever noted 3/8-> resolved  Leukocytosis improving  Blood culture NGTD  UA neg    MISC:    SOCIAL/FAMILY:  [] awaiting [x] updated at bedside [] family meeting  Updating daughter daily.     CODE STATUS:  [x] Full Code [] DNR [] DNI [] Palliative/Comfort Care    DISPOSITION:  [x] ICU [] Stroke Unit [] Floor [] EMU [] RCU [] PCU    Time spent: 45 critical care minutes       Assessment: 69 y/o M HTN, DM, chronic hyponatremia, POD 4  for IA Avastin for GBM.   Course complicated by cardiogenic shock/ takotsubo cardiomyopathy- likely secondary to hypertensive emergency in setting of neosynephrine  Resultant pulmonary edema  Severe lactic acidosis  DKA  Hyponatremia    NEURO: POD 4 s/p IA Avastin for GBM   Neurochecks Q4  CT/ CTA head & neck w/ perfusion. No LVO or evidence of perfusion deficit  Seizure disorder: Continue Keppra 750mg BID  EEG- negative. DCed  Obtain MRI brain w/ w/o contrast when stable  Analgesia: Tylenol prn.  Activity: PT/OT.     PULMONARY: Acute pulmonary edema. Likely secondary to cardiogenic shock- improved  Trach to vent. Full vent support, trial CPAP.   ABG, CXR. 3/11  Aggressive chest PT  ENT consult re: trach cuffed-> cuffless    CARDIOVASCULAR: Takotsubo cardiomopathy, shock, hypertensive emergency resulting in flash pulmonary edema- improving  ECHO: 3/8: Severely depressed LVEF ~ 15-20% with akinetic/apical wall and basal inferior wall hyperkinesis. Mod TR with mild pulm HTN  EKG: NSR with new septal Qs and 1mm DAVID  Shock resolved. Off pressors.  Goal MAP >/=65.   Cardiology following  POCUS daily. Repeat formal TTE 3/13.   GDMT- will need beta blocker once stable.  CXR- pulm congestion, probnp- 6000s. Lasix 40mg x1.     GASTROENTEROLOGY: Diarrhea  Diet: TFs- Osmolite at 55cc/hr  GI ppx: Sucralfate  LBM: 3/11, banatrol.  LFTs wnl. Monitor     RENAL/: Profound hyponatremia; low serum osm &  urine osm, hyperlactatemia, severe metabolic acidosis. Improved.  Na goal 125-130. Monitor BMPs.   [place] cath; monitor Is/Os  Nephrology following; appreciate recommendations  Replete lytes prn.   Lasix 40mg x1    ENDOCRINE: Diabetes Mellitus with DKA s/p insulin drip  On long acting insulin 10u  A1c- 5.8, TSH- 1.2. Cortisol- 16    HEME/ONC: Thrombocytopenia & anemia- likely in setting of chronic disease & chemotherapy. New DVT present on admission. RLE IM calf.   DCT ppx: Lovenox 40mg. Anti Xa level 0.23  LLE SCD. None on R  Obtain repeat dopplers on 3/13.   Monitor CBCs  Obtain heme consult 3/11    INFECTIOUS: Fever noted 3/8-> resolved  Leukocytosis normalized  Blood culture NGTD  UA neg  Sputum culture    MISC:    SOCIAL/FAMILY:  [] awaiting [x] updated at bedside [] family meeting  Updating daughter daily.     CODE STATUS:  [x] Full Code [] DNR [] DNI [] Palliative/Comfort Care    DISPOSITION:  [x] ICU [] Stroke Unit [] Floor [] EMU [] RCU [] PCU    Time spent: 45 critical care minutes

## 2023-03-11 NOTE — PROGRESS NOTE ADULT - ATTENDING COMMENTS
Pt is a 69 y/o man c DMII, HLP, hyponatremia, tracheal stenosis s/p tracheostomy, glioblastoma s/p rsxn with stress CM.    - echo c/w stress CM prob sec to norepinephrine  - this morning pt with afib (tele strip reviewed)/ SVT to 180s  - responded to 5mg IV lopressor; agree to start amio gtt and load to prevent recurrence  - slow bolus and 24 hour gtt  - agree to address acoag per nsgy

## 2023-03-11 NOTE — CONSULT NOTE ADULT - ASSESSMENT
68M with pmhx of T2DM, HLD, JAGDISH, hyponatremia, tracheal stenosis s/p tracheostomy, and glioblastoma now s/p cerebral angiogram with Avastin treatment. Hematology consulted for thrombocytopenia.    #grade II Thrombocytopenia, Plt 63k  Pt has had a history of thrombocytopenia with his baseline range  since the latter half of last year.   He was previously on temozolomide and RT (completed treatment in 12/2022), which coincides with his noted thrombocytopenia. He received Avastin on 3/7, and has a new flores of 63k today.  Peripheral slide reviewed - rare schistocytes 0-1/hpf,   Plasmic score 3 pts, low risk of TTP  4Ts score 2 pts, low probability of HIT  No other new medications were started that could cause DITP.  No clinical signs of DIC on exam, however, will need labs to support this. Please get coags, d-dimer, fibrinogen with am labs.  Thrombocytopenia is most likely 2/2 recent Avastin treatment, as avastin is known to cause thrombocytopenia in up to 58% of patients (Uptodate).  Transfuse 1U plt if bleeding and platelet count <50k, if febrile and platelet count <20k,     #anemia  Hb 9.7, on admission 11.7, MCV 94.9  -Consider iron studies, b12, folate  -please obtain hemolysis labs: ldh, reticulocyte count, haptoglobin     68M with pmhx of T2DM, HLD, JAGDISH, hyponatremia, tracheal stenosis s/p tracheostomy, and glioblastoma now s/p cerebral angiogram with Avastin treatment. Hematology consulted for thrombocytopenia.    #grade II Thrombocytopenia, Plt 63k  Pt has had a history of thrombocytopenia with his baseline range  since the latter half of last year.   He was previously on temozolomide and RT (completed treatment in 12/2022), which coincides with his noted thrombocytopenia. He received Avastin on 3/7, and has a new flores of 63k today.  Peripheral slide reviewed - rare schistocytes 0-1/hpf,   Plasmic score 3 pts, low risk of TTP  4Ts score 2 pts, low probability of HIT  No other new medications were started that could cause DITP.  No clinical signs of DIC on exam, however, will need labs to support this. Please get coags, d-dimer, fibrinogen with am labs.  Thrombocytopenia is most likely 2/2 recent Avastin treatment, as avastin is known to cause thrombocytopenia in up to 58% of patients (Uptodate).  Transfuse 1U plt if bleeding and platelet count <50k, if febrile and platelet count <20k,     #anemia  Hb 9.7, on admission 11.7, MCV 94.9  No episodes of bleeding.  -Please obtain iron studies, b12, folate  -please obtain hemolysis labs: ldh, reticulocyte count, haptoglobin    Pt discussed with Dr. Houser

## 2023-03-11 NOTE — CONSULT NOTE ADULT - SUBJECTIVE AND OBJECTIVE BOX
Hematology Consult Note    HPI:  67 y/o right handed male with pmhx of T2DM, HLD, JAGDISH, hyponatremia, tracheal stenosis s/p tracheostomy, and glioblastoma presents for cerebral angiogram with Avastin treatment.    Daughter states in January 2022, patient had onset of confusion, dizziness, and speech difficulty while in [place] and was diagnosed with brain mass. Pt evaluated upon return to the [place] at [facility] ED with CT head revealing left frontal brain mass. Pt underwent resection of left frontal enhancing tumor with GLEOLAN placed 1/27/2022. Path showed gliosarcoma, WHO grade IV, IDH wild-type, EGFR non amplified, MGMT promotor methylated. Pt was discharged to rehab and underwent radiation treatment and Temodar chemotherapy (completed 12/22). MRI 12/22 showed recurrence of brain mass and pt presents today referred by Dr. Mackenzie for trial participation cerebral angiogram with Avastin treatment.    Pt complains of dizziness. Denies headache, nausea, vomiting, weakness, numbness, chest pain, dyspnea.   (07 Mar 2023 12:30)      Allergies    amoxicillin (Rash)    Intolerances        MEDICATIONS  (STANDING):  acetylcysteine 20%  Inhalation 4 milliLiter(s) Inhalation every 6 hours  aMIOdarone Infusion 1 mG/Min (33.3 mL/Hr) IV Continuous <Continuous>  aMIOdarone Infusion 0.5 mG/Min (16.7 mL/Hr) IV Continuous <Continuous>  atorvastatin 20 milliGRAM(s) Oral at bedtime  chlorhexidine 0.12% Liquid 15 milliLiter(s) Oral Mucosa every 12 hours  chlorhexidine 2% Cloths 1 Application(s) Topical <User Schedule>  enoxaparin Injectable 40 milliGRAM(s) SubCutaneous <User Schedule>  fluticasone propionate 50 MICROgram(s)/spray Nasal Spray 1 Spray(s) Both Nostrils two times a day  insulin glargine Injectable (LANTUS) 10 Unit(s) SubCutaneous at bedtime  insulin regular  human corrective regimen sliding scale   SubCutaneous every 6 hours  levETIRAcetam 750 milliGRAM(s) Oral two times a day  metoprolol tartrate 12.5 milliGRAM(s) Oral every 12 hours  phenylephrine    Infusion 0.1 MICROgram(s)/kG/Min (2.67 mL/Hr) IV Continuous <Continuous>  sodium chloride 3%  Inhalation 4 milliLiter(s) Inhalation every 6 hours  sucralfate 1 Gram(s) Oral every 12 hours    MEDICATIONS  (PRN):  acetaminophen     Tablet .. 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  Type 2 diabetes mellitus  Hyperlipidemia  Iron deficiency anemia  Nephrolithiasis  Thrombocytopenia  Hyponatremia  BPH (benign prostatic hyperplasia)  S/P craniotomy  H/O tracheostomy    FAMILY HISTORY:  FH: diabetes mellitus (Father, Mother)  FH: hyperlipidemia (Father)  FH: hypertension (Father, Mother)    SOCIAL HISTORY: No EtOH, no tobacco    REVIEW OF SYSTEMS:  (patient nonverbal 2/2 trach, limited ROS, shakes head no)  CONSTITUTIONAL: No weakness, fevers or chills  EYES/ENT: No visual changes;  No vertigo or throat pain   NECK: No pain or stiffness  RESPIRATORY: No cough, wheezing, hemoptysis; No shortness of breath  CARDIOVASCULAR: No chest pain or palpitations  GASTROINTESTINAL: No abdominal or epigastric pain. No nausea, vomiting, or hematemesis; No diarrhea or constipation. No melena or hematochezia.  GENITOURINARY: No dysuria, frequency or hematuria  NEUROLOGICAL: No numbness or weakness  SKIN: No itching, burning, rashes, or lesions   All other review of systems is negative unless indicated above.        T(F): 99.2 (03-11-23 @ 14:04), Max: 101.4 (03-11-23 @ 09:12)  HR: 88 (03-11-23 @ 14:00)  BP: 102/61 (03-11-23 @ 10:00)  RR: 20 (03-11-23 @ 14:00)  SpO2: 96% (03-11-23 @ 14:00)  Wt(kg): --    GENERAL: NAD, well-developed  HEAD:  Atraumatic, Normocephalic  EYES: EOMI, PERRLA, conjunctiva and sclera clear  NECK: Supple, No JVD, + trach, + TLC  CHEST/LUNG: Clear to auscultation bilaterally; No wheeze  HEART: Regular rate and rhythm; No murmurs, rubs, or gallops  ABDOMEN: Soft, Nontender, Nondistended; Bowel sounds present  EXTREMITIES:  2+ Peripheral Pulses, No clubbing, cyanosis, or edema,   NEUROLOGY: non-focal  SKIN: No rashes or lesions, no bruising, no petechiae, no oozing around peripheral lines                          9.7    6.47  )-----------( 63       ( 11 Mar 2023 02:32 )             28.0       03-11    131<L>  |  95<L>  |  14  ----------------------------<  152<H>  4.1   |  28  |  0.62    Ca    8.2<L>      11 Mar 2023 02:32  Phos  2.5     03-11  Mg     2.0     03-11        Magnesium, Serum: 2.0 mg/dL (03-11 @ 02:32)  Phosphorus Level, Serum: 2.5 mg/dL (03-11 @ 02:32)

## 2023-03-11 NOTE — PROGRESS NOTE ADULT - SUBJECTIVE AND OBJECTIVE BOX
PM EVENTS: no acute overnight events as of documentation time of this note.    ROS: negative except as mentioned above.      ICU Vital Signs Last 24 Hrs  T(C): 37.3 (11 Mar 2023 17:25), Max: 38.6 (11 Mar 2023 09:12)  T(F): 99.2 (11 Mar 2023 17:25), Max: 101.4 (11 Mar 2023 09:12)  HR: 104 (11 Mar 2023 17:00) (88 - 181)  BP: 102/61 (11 Mar 2023 10:00) (90/71 - 123/80)  BP(mean): 77 (11 Mar 2023 10:00) (70 - 96)  ABP: 123/67 (11 Mar 2023 17:00) (89/59 - 151/72)  ABP(mean): 83 (11 Mar 2023 17:00) (67 - 93)  RR: 16 (11 Mar 2023 17:00) (15 - 24)  SpO2: 95% (11 Mar 2023 17:00) (91% - 99%)    O2 Parameters below as of 11 Mar 2023 17:00  Patient On (Oxygen Delivery Method): ventilator    O2 Concentration (%): 50        EXAM:     Skylar Coma Scale: 14    General: normocephalic, laying in bed, trach to vent, in no distress  Neuro     MS: A/Ox3 (to 'hospital' and appropriate month), cooperative with examination, bradyphrenia, no neglect, comprehension intact,    CN: PERRL, EOMI and no ptosis bilaterally, face symmetric, hearing grossly intact    Mot: bulk normal, tone normal, power 4/5 in bilateral upper and 5/5 in bilateral lower ext    Sens: intact to noxious stimuli in bilateral upper and lower ext  HEENT: tracheostomy to ventilator  Chest: nonlabored respirations, no adventitious lung sounds bilaterally, heart regular rate/rhythm, present S1/S2, no murmurs or rubs  Abdomen: nondistended, soft and nontender without peritoneal signs, normoactive bowel sounds  Extremities: no clubbing, well-perfused, no edema    ABG - ( 11 Mar 2023 08:13 )  pH, Arterial: 7.47  pH, Blood: x     /  pCO2: 38    /  pO2: 98    / HCO3: 28    / Base Excess: 3.9   /  SaO2: 98.2                                    9.7    6.47  )-----------( 63       ( 11 Mar 2023 02:32 )             28.0     03-11    131<L>  |  95<L>  |  16  ----------------------------<  248<H>  3.7   |  26  |  0.63    Ca    8.3<L>      11 Mar 2023 15:52  Phos  2.6     03-11  Mg     1.8     03-11        MEDICATIONS  (STANDING):  acetylcysteine 20%  Inhalation 4 milliLiter(s) Inhalation every 6 hours  aMIOdarone Infusion 1 mG/Min (33.3 mL/Hr) IV Continuous <Continuous>  aMIOdarone Infusion 0.5 mG/Min (16.7 mL/Hr) IV Continuous <Continuous>  atorvastatin 20 milliGRAM(s) Oral at bedtime  chlorhexidine 0.12% Liquid 15 milliLiter(s) Oral Mucosa every 12 hours  chlorhexidine 2% Cloths 1 Application(s) Topical <User Schedule>  enoxaparin Injectable 40 milliGRAM(s) SubCutaneous <User Schedule>  fluticasone propionate 50 MICROgram(s)/spray Nasal Spray 1 Spray(s) Both Nostrils two times a day  insulin glargine Injectable (LANTUS) 10 Unit(s) SubCutaneous at bedtime  insulin regular  human corrective regimen sliding scale   SubCutaneous every 6 hours  insulin regular  human recombinant 2 Unit(s) SubCutaneous every 6 hours  levETIRAcetam 750 milliGRAM(s) Oral two times a day  metoprolol tartrate 12.5 milliGRAM(s) Oral every 12 hours  phenylephrine    Infusion 0.1 MICROgram(s)/kG/Min (2.67 mL/Hr) IV Continuous <Continuous>  sodium chloride 3%  Inhalation 4 milliLiter(s) Inhalation every 6 hours  sodium phosphate 15 milliMole(s)/250 mL IVPB 15 milliMole(s) IV Intermittent once  sucralfate 1 Gram(s) Oral every 12 hours    MEDICATIONS  (PRN):  acetaminophen     Tablet .. 650 milliGRAM(s) Oral every 6 hours PRN Temp greater or equal to 38C (100.4F), Mild Pain (1 - 3)  albuterol/ipratropium for Nebulization 3 milliLiter(s) Nebulizer every 6 hours PRN Shortness of Breath and/or Wheezing  ondansetron Injectable 4 milliGRAM(s) IV Push every 8 hours PRN Nausea and/or Vomiting  sodium chloride 0.9% lock flush 10 milliLiter(s) IV Push every 1 hour PRN Pre/post blood products, medications, blood draw, and to maintain line patency      Please see the day's note documented by Dr. Hendrix for detailed ongoing assessment and plan.     PM EVENTS: no acute overnight events as of documentation time of this note.    ROS: negative except as mentioned above.      ICU Vital Signs Last 24 Hrs  T(C): 37.3 (11 Mar 2023 17:25), Max: 38.6 (11 Mar 2023 09:12)  T(F): 99.2 (11 Mar 2023 17:25), Max: 101.4 (11 Mar 2023 09:12)  HR: 104 (11 Mar 2023 17:00) (88 - 181)  BP: 102/61 (11 Mar 2023 10:00) (90/71 - 123/80)  BP(mean): 77 (11 Mar 2023 10:00) (70 - 96)  ABP: 123/67 (11 Mar 2023 17:00) (89/59 - 151/72)  ABP(mean): 83 (11 Mar 2023 17:00) (67 - 93)  RR: 16 (11 Mar 2023 17:00) (15 - 24)  SpO2: 95% (11 Mar 2023 17:00) (91% - 99%)    O2 Parameters below as of 11 Mar 2023 17:00  Patient On (Oxygen Delivery Method): ventilator    O2 Concentration (%): 50        EXAM:     Skylar Coma Scale: 14    General: normocephalic, laying in bed, trach to vent, in no distress  Neuro     MS: A/Ox3 (to 'hospital' and appropriate month/year), cooperative with examination, no neglect, comprehension intact,    CN: PERRL, EOMI and no ptosis bilaterally, face symmetric, hearing grossly intact    Mot: bulk normal, tone normal, power 5/5 in bilateral upper and 5/5 in bilateral lower ext    Sens: intact to noxious stimuli in bilateral upper and lower ext  HEENT: tracheostomy to ventilator  Chest: nonlabored respirations, no adventitious lung sounds bilaterally, heart regular rate/rhythm, present S1/S2, no murmurs or rubs  Abdomen: nondistended, soft and nontender without peritoneal signs, normoactive bowel sounds  Extremities: no clubbing, well-perfused, no edema    ABG - ( 11 Mar 2023 08:13 )  pH, Arterial: 7.47  pH, Blood: x     /  pCO2: 38    /  pO2: 98    / HCO3: 28    / Base Excess: 3.9   /  SaO2: 98.2                                    9.7    6.47  )-----------( 63       ( 11 Mar 2023 02:32 )             28.0     03-11    131<L>  |  95<L>  |  16  ----------------------------<  248<H>  3.7   |  26  |  0.63    Ca    8.3<L>      11 Mar 2023 15:52  Phos  2.6     03-11  Mg     1.8     03-11        MEDICATIONS  (STANDING):  acetylcysteine 20%  Inhalation 4 milliLiter(s) Inhalation every 6 hours  aMIOdarone Infusion 1 mG/Min (33.3 mL/Hr) IV Continuous <Continuous>  aMIOdarone Infusion 0.5 mG/Min (16.7 mL/Hr) IV Continuous <Continuous>  atorvastatin 20 milliGRAM(s) Oral at bedtime  chlorhexidine 0.12% Liquid 15 milliLiter(s) Oral Mucosa every 12 hours  chlorhexidine 2% Cloths 1 Application(s) Topical <User Schedule>  enoxaparin Injectable 40 milliGRAM(s) SubCutaneous <User Schedule>  fluticasone propionate 50 MICROgram(s)/spray Nasal Spray 1 Spray(s) Both Nostrils two times a day  insulin glargine Injectable (LANTUS) 10 Unit(s) SubCutaneous at bedtime  insulin regular  human corrective regimen sliding scale   SubCutaneous every 6 hours  insulin regular  human recombinant 2 Unit(s) SubCutaneous every 6 hours  levETIRAcetam 750 milliGRAM(s) Oral two times a day  metoprolol tartrate 12.5 milliGRAM(s) Oral every 12 hours  phenylephrine    Infusion 0.1 MICROgram(s)/kG/Min (2.67 mL/Hr) IV Continuous <Continuous>  sodium chloride 3%  Inhalation 4 milliLiter(s) Inhalation every 6 hours  sodium phosphate 15 milliMole(s)/250 mL IVPB 15 milliMole(s) IV Intermittent once  sucralfate 1 Gram(s) Oral every 12 hours    MEDICATIONS  (PRN):  acetaminophen     Tablet .. 650 milliGRAM(s) Oral every 6 hours PRN Temp greater or equal to 38C (100.4F), Mild Pain (1 - 3)  albuterol/ipratropium for Nebulization 3 milliLiter(s) Nebulizer every 6 hours PRN Shortness of Breath and/or Wheezing  ondansetron Injectable 4 milliGRAM(s) IV Push every 8 hours PRN Nausea and/or Vomiting  sodium chloride 0.9% lock flush 10 milliLiter(s) IV Push every 1 hour PRN Pre/post blood products, medications, blood draw, and to maintain line patency      Please see the day's note documented by Dr. Hendrix for detailed ongoing assessment and plan.

## 2023-03-11 NOTE — PROGRESS NOTE ADULT - ATTENDING COMMENTS
This patient has Glioblastoma S/P surgical resection. chemotherapy and radiotherapy/ He has a tracheotomy in place because of tracheal stenosis. Osmolyte feeds are being given .On 10 units Lantus Insulin plus SS ,glucose levels were 134-134-164 last night and today 141-187.He is receiving 2 units Regular Insulin every 6 hours.

## 2023-03-11 NOTE — PROGRESS NOTE ADULT - SUBJECTIVE AND OBJECTIVE BOX
HPI:  69 y/o right handed male with pmhx of T2DM, HLD, JAGDISH, hyponatremia, tracheal stenosis s/p tracheostomy, and glioblastoma presents for cerebral angiogram with Avastin treatment.    Daughter states in January 2022, patient had onset of confusion, dizziness, and speech difficulty while in [place] and was diagnosed with brain mass. Pt evaluated upon return to the [place] at [facility] ED with CT head revealing left frontal brain mass. Pt underwent resection of left frontal enhancing tumor with GLEOLAN placed 1/27/2022. Path showed gliosarcoma, WHO grade IV, IDH wild-type, EGFR non amplified, MGMT promotor methylated. Pt was discharged to rehab and underwent radiation treatment and Temodar chemotherapy (completed 12/22). MRI 12/22 showed recurrence of brain mass and pt presents today referred by Dr. Mackenzie for trial participation cerebral angiogram with Avastin treatment.    Pt complains of dizziness. Denies headache, nausea, vomiting, weakness, numbness, chest pain, dyspnea.   (07 Mar 2023 12:30)    Hospital Course:  3/7: POD0 cerebral angiogram IA avastin. post op in cath lab hypertensive, +flash pulmonary edema w/ desaturation, given diuretics. on cpap   3/8: POD1. o/n new global aphasia, right side w/d. CTH/CTA/CTP performed. ABG o/n with metabolic acidosis, lactate 7.9 and hyperglycemic 320s. started on insulin gtt, 1L NS bolus and NS@200cc/hr. desatting on cpap, placed on full vent support. propofol for sedation. in SVT, resolved with 5 lopresor. fluids stopped due to worsening CXR. repeat lactate 12, given 1amp bicarb then started on bicarb gtt. POCUS this am with hypokinetic left ventricle, pending echo. Cards/nephro/ endo consulted for further recs. Insulin gtt dc'd. VEEG monitoring. S/p 3%Na.  SQL started tonight. S/p 10 NPH after FS of 166. austin gtt. S/p 40 IV lasix. Troponin downtrending 0.32 to 0.18. EEG negative for seizures. Pancultured spiked 101.5. UA negative. Bicarb gtt dc'd. TTE EF15-20%, akinetic apex and midsegments suggestive of takotsubo  3/9: POD2. ANA LILIA o/n neuro stable. remains sedated on propofol, on levo gtt. Propofol dc'd. s/p lasix 40 mg IV x 1. CXR L effusion improved. Started lantus 10u at bedtime per endocrine. Campuzano removed, f/u TOV.    3/10: POD3 ANA LILIA overnight. Neuro exam stable. multiple bouts of diarrhea overnight and AM. Tube feeds held. s/p albumin 250 cc +  cc for tachycardia in the setting of negative fluid status. Tolerating CPAP. Tolerating trach collar x 3 hours, switched back to CPAP overnight. Off levo gtt.   3/11: POD4 Episode of O2 desaturation to low 90s while on 40% FiO2. FiO2 increased to 60% briefly with improvement in O2, good breath sounds throughout, Pt denies SOB and CXR stable. Neuro exam stable. RT placed for persistent diarrhea.    Vital Signs Last 24 Hrs  T(C): 36.8 (10 Mar 2023 22:00), Max: 38.4 (10 Mar 2023 14:00)  T(F): 98.2 (10 Mar 2023 22:00), Max: 101.1 (10 Mar 2023 14:00)  HR: 102 (10 Mar 2023 22:24) (78 - 128)  BP: 91/59 (10 Mar 2023 21:00) (91/59 - 91/59)  BP(mean): 70 (10 Mar 2023 21:00) (70 - 70)  RR: 20 (10 Mar 2023 22:24) (12 - 28)  SpO2: 94% (10 Mar 2023 22:24) (85% - 100%)    Parameters below as of 10 Mar 2023 22:24  Patient On (Oxygen Delivery Method): ventilator    O2 Concentration (%): 60    I&O's Summary    09 Mar 2023 07:01  -  10 Mar 2023 07:00  --------------------------------------------------------  IN: 884.1 mL / OUT: 1625 mL / NET: -740.9 mL    10 Mar 2023 07:01  -  10 Mar 2023 23:49  --------------------------------------------------------  IN: 881 mL / OUT: 625 mL / NET: 256 mL        PHYSICAL EXAM:  GEN: laying in bed, NAD  NEURO: AAOx3 (with choices nods/shakes head appropriately), FC  CNII-XII: EOM intact, PERRL, OE spont, face symmetric  Motor: MAEx4 5/5 throughout B/L UE and LE, no pronator drift  CV: RRR +S1/S2  PULM: CTAB  GI: Abd soft, NT/ND  EXT: ext warm, dry, nontender. pulses 2+ b/l  WOUND: R groin site c/d/i    TUBES/LINES:  [] Campuzano  [] Lumbar Drain  [] Wound Drains  [x] Others: RT, R IJ    DIET:  [] NPO  [] Mechanical  [x] Tube feeds    LABS:                        10.4   7.50  )-----------( 76       ( 10 Mar 2023 05:45 )             30.1     03-10    131<L>  |  96  |  15  ----------------------------<  149<H>  3.8   |  27  |  0.64    Ca    8.2<L>      10 Mar 2023 14:27  Phos  3.0     03-10  Mg     2.0     03-10          CARDIAC MARKERS ( 08 Mar 2023 23:55 )  x     / 0.13 ng/mL / x     / x     / x          CAPILLARY BLOOD GLUCOSE      POCT Blood Glucose.: 134 mg/dL (10 Mar 2023 17:04)  POCT Blood Glucose.: 134 mg/dL (10 Mar 2023 13:23)  POCT Blood Glucose.: 139 mg/dL (10 Mar 2023 06:46)      Drug Levels: [] N/A    CSF Analysis: [] N/A      Allergies    amoxicillin (Rash)    Intolerances      MEDICATIONS:  Antibiotics:    Neuro:  acetaminophen     Tablet .. 650 milliGRAM(s) Oral every 6 hours PRN  levETIRAcetam  IVPB 750 milliGRAM(s) IV Intermittent every 12 hours  ondansetron Injectable 4 milliGRAM(s) IV Push every 8 hours PRN    Anticoagulation:  enoxaparin Injectable 40 milliGRAM(s) SubCutaneous <User Schedule>    OTHER:  albuterol/ipratropium for Nebulization 3 milliLiter(s) Nebulizer every 6 hours PRN  atorvastatin 20 milliGRAM(s) Oral at bedtime  chlorhexidine 0.12% Liquid 15 milliLiter(s) Oral Mucosa every 12 hours  chlorhexidine 2% Cloths 1 Application(s) Topical <User Schedule>  fluticasone propionate 50 MICROgram(s)/spray Nasal Spray 1 Spray(s) Both Nostrils two times a day  insulin glargine Injectable (LANTUS) 10 Unit(s) SubCutaneous at bedtime  insulin regular  human corrective regimen sliding scale   SubCutaneous every 6 hours  sucralfate 1 Gram(s) Oral every 12 hours    IVF:    CULTURES:  Culture Results:   No growth at 2 days. (03-08 @ 17:20)  Culture Results:   No growth at 2 days. (03-08 @ 17:20)    RADIOLOGY & ADDITIONAL TESTS:      ASSESSMENT:  69 y/o right handed male with pmhx of T2DM, HLD, JAGDISH, hyponatremia, tracheal stenosis s/p tracheostomy, and glioblastoma now s/p cerebral angiogram with Avastin treatment (3/7/23). c/b cardiogenic shock, cardiomyopathy, improving.     C71.9    Abnormal electrocardiogram [ECG] [EKG]    Allergy, unspecified, initial encounter    Anemia, unspecified    Benign prostatic hyperplasia without lower urinary tract symptoms    Calculus of kidney    Cough, unspecified    Depression, unspecified    ENCOUNTER FOR ATTENT    Encounter for general adult medical examination without abnormal findings    Encounter for immunization    Encounter for immunization safety counseling    Encounter for other preprocedural examination    Encounter for screening for malignant neoplasm of prostate    Encounter for screening for other metabolic disorders    Encounter for screening for other viral diseases    ESSENTIAL (PRIMARY)    Gastro-esophageal reflux disease without esophagitis    Generalized anxiety disorder    History of Glioblastoma    Hyperlipidemia, unspecified    Hypo-osmolality and hyponatremia    Hypotension, unspecified    Iron deficiency    Iron deficiency anemia, unspecified    LONG TERM (CURRENT)    LONG TERM (CURRENT)    LONG TERM (CURRENT)    Malignant neoplasm of brain, unspecified    Nausea with vomiting, unspecified    Other constipation    Other fatigue    Other injury of unspecified body region, initial encounter    OTHER SPECIFIED DISE    Personal history of malignant neoplasm, unspecified    Personal history of other diseases of the nervous system and sense organs    Personal history of other endocrine, nutritional and metabolic disease    Personal history of other mental and behavioral disorders    Personal history of transient ischemic attack (TIA), and cerebral infarction without residual deficits    POSTPROCEDURAL SUBGL    Pruritus, unspecified    Shortness of breath    Tachycardia, unspecified    Thrombocytopenia, unspecified    Unspecified abdominal pain    Unspecified visual disturbance    Urinary calculus, unspecified    Vitamin D deficiency, unspecified    No pertinent family history in first degree relatives    FH: diabetes mellitus (Father, Mother)    FH: hyperlipidemia (Father)    FH: hypertension (Father, Mother)    Handoff    No pertinent past medical history    Diabetes mellitus    Hypertension    Glioblastoma    Type 2 diabetes mellitus    Hyperlipidemia    Iron deficiency anemia    Nephrolithiasis    Thrombocytopenia    Hyponatremia    BPH (benign prostatic hyperplasia)    No pertinent past medical history    GBM (glioblastoma multiforme)    GBM (glioblastoma multiforme)    Acute respiratory failure with hypoxia    Tracheostomy status    Acute hyponatremia    Angiogram, carotid and cerebral arteries    No significant past surgical history    No significant past surgical history    S/P craniotomy    H/O tracheostomy    No significant past surgical history    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      PLAN:  Neuro   - Vitals/neuro q4   - CTA/CTP/repeat CTH negative    - Pain control   - Cont home Keppra 750 BID  - MRI w/wo on stepdown     Cards  - Goal MAP > 65, off levophed gtt  - Cards following - GDMT   - TTE EF 15-20%, akinetic apex and midsegments suggestive of takotsubo, repeat 3/13     Pulm  - full vent support  - Pulm following    GI  - NGT - Osmolite 1.2 @ 65 d/t diarrhea   - Bowel regimen held for loose stool, + banatrol, last BM 3/10  - Cont home protonix  - RT placed 3/10    RENAL:   - Chronic hyponatremia   - voiding  - trend lactate, WNL now    HEME:  - DVT ppx: SQL, SCD L side only  - LE dopplers + R IM calf DVT, continue serial dopplers 3/13    ID:   - afebrile  - 3/8 pancx neg    ENDO:   - DKA resolved    - DM: A1C 5.8, coninue ISS   - Lantus 10u at bedtime    DISPO:   - ICU status, full code, dispo pending    D/W Dr. Ho and Dr. Huggins    Assessment:  Present when checked    []  GCS  E   V  M     Heart Failure: []Acute, [] acute on chronic , []chronic  Heart Failure:  [] Diastolic (HFpEF), [] Systolic (HFrEF), []Combined (HFpEF and HFrEF), [] RHF, [] Pulm HTN, [] Other    [] EUGENIA, [] ATN, [] AIN, [] other  [] CKD1, [] CKD2, [] CKD 3, [] CKD 4, [] CKD 5, []ESRD    Encephalopathy: [] Metabolic, [] Hepatic, [] toxic, [] Neurological, [] Other    Abnormal Nurtitional Status: [] malnurtition (see nutrition note), [ ]underweight: BMI < 19, [] morbid obesity: BMI >40, [] Cachexia    [] Sepsis  [] hypovolemic shock,[] cardiogenic shock, [] hemorrhagic shock, [] neuogenic shock  [] Acute Respiratory Failure  []Cerebral edema, [] Brain compression/ herniation,   [] Functional quadriplegia  [] Acute blood loss anemia

## 2023-03-11 NOTE — PROGRESS NOTE ADULT - SUBJECTIVE AND OBJECTIVE BOX
INTERVAL HPI/OVERNIGHT EVENTS: Noted SVT to 180 in the AM briefly placed on neosynephrine, subsequently down titrated and discontinued,  given Lopressor 5mg IVP with conversion to sinus rhythm.    SUBJECTIVE: Patient seen and examined at bedside. No new cardiovascular complaints, denies chest pain, shortness of breath, changes in mental status.    OBJECTIVE:    VITAL SIGNS:  ICU Vital Signs Last 24 Hrs  T(C): 37.3 (11 Mar 2023 14:04), Max: 38.6 (11 Mar 2023 09:12)  T(F): 99.2 (11 Mar 2023 14:04), Max: 101.4 (11 Mar 2023 09:12)  HR: 95 (11 Mar 2023 15:00) (88 - 181)  BP: 102/61 (11 Mar 2023 10:00) (90/71 - 123/80)  BP(mean): 77 (11 Mar 2023 10:00) (70 - 96)  ABP: 110/58 (11 Mar 2023 15:00) (89/59 - 151/72)  ABP(mean): 74 (11 Mar 2023 15:00) (67 - 93)  RR: 18 (11 Mar 2023 15:00) (15 - 24)  SpO2: 95% (11 Mar 2023 15:00) (85% - 99%)    O2 Parameters below as of 11 Mar 2023 15:00  Patient On (Oxygen Delivery Method): ventilator    O2 Concentration (%): 55      Mode: AC/ CMV (Assist Control/ Continuous Mandatory Ventilation), RR (machine): 12, TV (machine): 400, FiO2: 55, PEEP: 8, ITime: 1, MAP: 10, PIP: 13    03-10 @ 07:01 - 03-11 @ 07:00  --------------------------------------------------------  IN: 1281 mL / OUT: 1025 mL / NET: 256 mL    03-11 @ 06:01  - 03-11 @ 15:03  --------------------------------------------------------  IN: 183.3 mL / OUT: 550 mL / NET: -366.7 mL      CAPILLARY BLOOD GLUCOSE      POCT Blood Glucose.: 181 mg/dL (11 Mar 2023 11:06)      PHYSICAL EXAM:    GENERAL: NAD, well-developed  HEAD:  Atraumatic, Normocephalic  EYES: EOMI, conjunctiva and sclera clear  NECK: Supple, No JVD, s/p tracheostomy connected to ventilator  CHEST/LUNG: Clear to auscultation bilaterally; No wheeze  HEART: Regular rate and rhythm; No murmurs, rubs, or gallops  ABDOMEN: Soft, Nontender, Nondistended; Bowel sounds present  EXTREMITIES:  2+ Peripheral Pulses, No clubbing, cyanosis, or edema  PSYCH: AAOx3  NEUROLOGY: non-focal  SKIN: No rashes or lesions      MEDICATIONS:  MEDICATIONS  (STANDING):  acetylcysteine 20%  Inhalation 4 milliLiter(s) Inhalation every 6 hours  aMIOdarone Infusion 1 mG/Min (33.3 mL/Hr) IV Continuous <Continuous>  aMIOdarone Infusion 0.5 mG/Min (16.7 mL/Hr) IV Continuous <Continuous>  atorvastatin 20 milliGRAM(s) Oral at bedtime  chlorhexidine 0.12% Liquid 15 milliLiter(s) Oral Mucosa every 12 hours  chlorhexidine 2% Cloths 1 Application(s) Topical <User Schedule>  enoxaparin Injectable 40 milliGRAM(s) SubCutaneous <User Schedule>  fluticasone propionate 50 MICROgram(s)/spray Nasal Spray 1 Spray(s) Both Nostrils two times a day  insulin glargine Injectable (LANTUS) 10 Unit(s) SubCutaneous at bedtime  insulin regular  human corrective regimen sliding scale   SubCutaneous every 6 hours  levETIRAcetam 750 milliGRAM(s) Oral two times a day  metoprolol tartrate 12.5 milliGRAM(s) Oral every 12 hours  phenylephrine    Infusion 0.1 MICROgram(s)/kG/Min (2.67 mL/Hr) IV Continuous <Continuous>  sodium chloride 3%  Inhalation 4 milliLiter(s) Inhalation every 6 hours  sucralfate 1 Gram(s) Oral every 12 hours    MEDICATIONS  (PRN):  acetaminophen     Tablet .. 650 milliGRAM(s) Oral every 6 hours PRN Temp greater or equal to 38C (100.4F), Mild Pain (1 - 3)  albuterol/ipratropium for Nebulization 3 milliLiter(s) Nebulizer every 6 hours PRN Shortness of Breath and/or Wheezing  ondansetron Injectable 4 milliGRAM(s) IV Push every 8 hours PRN Nausea and/or Vomiting  sodium chloride 0.9% lock flush 10 milliLiter(s) IV Push every 1 hour PRN Pre/post blood products, medications, blood draw, and to maintain line patency      ALLERGIES:  amoxicillin (Rash)    Intolerances    LABS:                        9.7    6.47  )-----------( 63       ( 11 Mar 2023 02:32 )             28.0     03-11    131<L>  |  95<L>  |  14  ----------------------------<  152<H>  4.1   |  28  |  0.62    Ca    8.2<L>      11 Mar 2023 02:32  Phos  2.5     03-11  Mg     2.0     03-11    RADIOLOGY & ADDITIONAL TESTS: Reviewed.

## 2023-03-11 NOTE — PROGRESS NOTE ADULT - ASSESSMENT
68M PMH T2DM, HLD, JAGDISH, hyponatremia, tracheal stenosis s/p tracheostomy, and glioblastoma s/p resection of the left frontal enhancing tumor admitted for cerebral angiogram with Avastin treatment in the setting of a recurrent mass c/b stress cardiomyopathy 2/2 excess norepinephrine.    Review of studies:  ECG: NSR, 1st degree AVB, 0.5mm- 1mm DAVID v1-v3, IVCD  TTE 3/2021: Normal Bi-V function  TTE with severely reduced LV systolic function EF 15-20%, apex and mid segments are akinetic  TTE 3/8/23: LVEF 15-20% . The entire apex and all the mid segments are akinetic, with relative sparing of the basal segments. In the absence of ischemic heart disease, this is suggestive of Takotsubo's cardiomyopathy. Clinical correlation is advised. With echocontrast imaging, there is no evidence of left ventricular thrombus. Normal right ventricular size and systolic function. Normal atria. Moderate tricuspid regurgitation. PASP 40 mmHg. No pericardial effusion. Mildly dilated aortic root.     #Stress induced cardiomyopathy (Takotsubo cardiomyopathy)   Likely due to excess norepinephrine given relative good functional capacity, and normal TTE prior to the procedure.  Troponin peaked 0.32, no need to trend further. Off vasopressor support since 3/10. Briefly placed on neosynephrine during SVT episode, otherwise hemodynamically stable once in sinus rhythm.  - Repeat TTE next week    #SVT/Atrial fibrillation  Telemetry 3/11/23: one episode of atrial fibrillation and SVT to 180s in the late morning - subsequent sinus rhythm s/p IV lopressor.  - s/p Lopressor 5mg IVP for SVT  - Please start Lopressor 12.5mg BID (hold HR<60 )  - Please initiate amiodarone load 150mg over 10 mins and then 1mg/min for 6hrs followed by 0.5mg/min for 18 hours and then 400mg BID to complete load then transition to amiodarone 200mg PO maintenance dose to maintain sinus rhythm.  - CHADSVASC 3 - will re-evaluate need for anticoagulation if cleared from neurosurgery perspective    Plan discussed with cardiology consult attending.

## 2023-03-12 NOTE — CHART NOTE - NSCHARTNOTEFT_GEN_A_CORE
Tele reviewed    Afib has converted to Sinus Tach/NSR on amio gtt  Can d/c amio gtt after 24 hrs   - increase Metoprolol tartrate to 25mg PO BID     Fever and infection likely triggers  Should improve with antipyretics and antibiotics     Toro Flores MD PANDA  Cardiovascular Disease Fellow, PGY-5

## 2023-03-12 NOTE — PROGRESS NOTE ADULT - SUBJECTIVE AND OBJECTIVE BOX
SUBJECTIVE / INTERVAL HPI: Patient was seen and examined this morning.     CAPILLARY BLOOD GLUCOSE & INSULIN RECEIVED  141 mg/dL (03-11 @ 06:24)  181 mg/dL (03-11 @ 11:06)  248 mg/dL (03-11 @ 15:52)  229 mg/dL (03-11 @ 16:13)  154 mg/dL (03-11 @ 23:16)  235 mg/dL (03-12 @ 04:28)  259 mg/dL (03-12 @ 06:24)  204 mg/dL (03-12 @ 11:21)  136 mg/dL (03-12 @ 16:35)      REVIEW OF SYSTEMS  Constitutional:  Negative fever, chills or loss of appetite.  Eyes:  Negative blurry vision or double vision.  Cardiovascular:  Negative for chest pain or palpitations.  Respiratory:  Negative for cough, wheezing, or shortness of breath.    Gastrointestinal:  Negative for nausea, vomiting, diarrhea, constipation, or abdominal pain.  Genitourinary:  Negative frequency, urgency or dysuria.  Neurologic:  No headache, confusion, dizziness, lightheadedness.    PHYSICAL EXAM  Vital Signs Last 24 Hrs  T(C): 37.1 (12 Mar 2023 13:55), Max: 38.7 (12 Mar 2023 06:14)  T(F): 98.7 (12 Mar 2023 13:55), Max: 101.7 (12 Mar 2023 06:14)  HR: 100 (12 Mar 2023 16:31) (95 - 113)  BP: 112/76 (12 Mar 2023 15:45) (96/59 - 119/75)  BP(mean): 89 (12 Mar 2023 15:45) (73 - 92)  RR: 23 (12 Mar 2023 16:31) (17 - 27)  SpO2: 100% (12 Mar 2023 16:31) (91% - 100%)    Parameters below as of 12 Mar 2023 16:31  Patient On (Oxygen Delivery Method): ventilator    O2 Concentration (%): 50    Constitutional: Awake, alert, in no acute distress.   HEENT: Normocephalic, atraumatic, JUAN.  Respiratory: Lungs clear to ausculation bilaterally.   Cardiovascular: regular rhythm, normal S1 and S2, no audible murmurs.   GI: soft, non-tender, non-distended, bowel sounds present.  Extremities: No lower extremity edema.  Psychiatric: AAO x 3. Normal affect/mood.     LABS  CBC - WBC/HGB/HTC/PLT: 5.51/9.9/28.9/79 (03-12-23)  BMP - Na/K/Cl/Bicarb/BUN/Cr/Gluc/AG/eGFR: 130/3.6/95/26/17/0.59/235/9/106 (03-12-23)  Ca - 7.8 (03-12-23)  Phos - 2.3 (03-12-23)  Mg - 2.0 (03-12-23)  LFT - Alb/Tprot/Tbili/Dbili/AlkPhos/ALT/AST: 3.0/--/1.1/0.4/70/61/88 (03-12-23)  PT/aPTT/INR: 18.1/28.7/1.51 (03-12-23)   Thyroid Stimulating Hormone, Serum: 1.210 (03-08-23)      MEDICATIONS  MEDICATIONS  (STANDING):  acetylcysteine 20%  Inhalation 4 milliLiter(s) Inhalation every 6 hours  aMIOdarone    Tablet 400 milliGRAM(s) Oral two times a day  atorvastatin 20 milliGRAM(s) Oral at bedtime  cefepime   IVPB 2000 milliGRAM(s) IV Intermittent every 8 hours  chlorhexidine 0.12% Liquid 15 milliLiter(s) Oral Mucosa every 12 hours  chlorhexidine 2% Cloths 1 Application(s) Topical <User Schedule>  enoxaparin Injectable 40 milliGRAM(s) SubCutaneous <User Schedule>  fluticasone propionate 50 MICROgram(s)/spray Nasal Spray 1 Spray(s) Both Nostrils two times a day  insulin glargine Injectable (LANTUS) 10 Unit(s) SubCutaneous at bedtime  insulin regular  human corrective regimen sliding scale   SubCutaneous every 6 hours  insulin regular  human recombinant 2 Unit(s) SubCutaneous every 6 hours  iron sucrose IVPB 300 milliGRAM(s) IV Intermittent every 24 hours  levETIRAcetam 750 milliGRAM(s) Oral two times a day  metoprolol tartrate 12.5 milliGRAM(s) Oral once  metoprolol tartrate 25 milliGRAM(s) Oral every 12 hours  sodium chloride 3%  Inhalation 4 milliLiter(s) Inhalation every 6 hours  sucralfate 1 Gram(s) Oral every 12 hours  vancomycin  IVPB 1000 milliGRAM(s) IV Intermittent every 12 hours    MEDICATIONS  (PRN):  acetaminophen     Tablet .. 650 milliGRAM(s) Oral every 6 hours PRN Temp greater or equal to 38C (100.4F), Mild Pain (1 - 3)  albuterol/ipratropium for Nebulization 3 milliLiter(s) Nebulizer every 6 hours PRN Shortness of Breath and/or Wheezing  ondansetron Injectable 4 milliGRAM(s) IV Push every 8 hours PRN Nausea and/or Vomiting  sodium chloride 0.9% lock flush 10 milliLiter(s) IV Push every 1 hour PRN Pre/post blood products, medications, blood draw, and to maintain line patency    ASSESSMENT / RECOMMENDATIONS    A1C: 5.8 %  BUN: 17  Creatinine: 0.59  GFR: 106  Weight: 71.214  BMI: 24.6  EF:     # Type 2 diabetes mellitus  - Please continue lantus *** units at bedtime.   - Continue lispro *** units before each meal.  - Continue lispro moderate / low dose sliding scale before meals and at bedtime.  - Patient's fingerstick glucose goal is 100-180 mg/dL.    - For discharge, patient can ***.    - Patient can follow up at discharge with [facility] by calling [phone] to make an appointment.      Case discussed with Dr. Salamanca. Primary team updated.       Fabrice Madrid    Endocrinology Fellow    Service Pager: [phone]    SUBJECTIVE / INTERVAL HPI: Patient was seen and examined this morning. He continues to receive tube feeds with Osmolite at goal rate of 65 mL/hr without any significant interruption. Blood glucose levels have been intermittently above target.     CAPILLARY BLOOD GLUCOSE & INSULIN RECEIVED  181 mg/dL (03-11 @ 11:06) ? 2 units of sliding scale.   229 mg/dL (03-11 @ 16:13) ? 2 units of regular insulin + 4 units of sliding scale  154 mg/dL (03-11 @ 23:16) ? 10 units of lantus + 2 units of regular insulin + 2 units of sliding scale.   259 mg/dL (03-12 @ 06:24) ? 2 units of regular insulin + 6 units of sliding scale.   204 mg/dL (03-12 @ 11:21)  ? 2 units of regular insulin + 4 units of sliding scale.   136 mg/dL (03-12 @ 16:35)  ? 2 units of regular insulin.    PHYSICAL EXAM  Vital Signs Last 24 Hrs  T(C): 37.1 (12 Mar 2023 13:55), Max: 38.7 (12 Mar 2023 06:14)  T(F): 98.7 (12 Mar 2023 13:55), Max: 101.7 (12 Mar 2023 06:14)  HR: 100 (12 Mar 2023 16:31) (95 - 113)  BP: 112/76 (12 Mar 2023 15:45) (96/59 - 119/75)  BP(mean): 89 (12 Mar 2023 15:45) (73 - 92)  RR: 23 (12 Mar 2023 16:31) (17 - 27)  SpO2: 100% (12 Mar 2023 16:31) (91% - 100%)    Parameters below as of 12 Mar 2023 16:31  Patient On (Oxygen Delivery Method): ventilator    O2 Concentration (%): 50    Constitutional: Awake, alert, on mechanical ventilation via tracheostomy, following commands.   Neck: (+) Tracheostomy,  Respiratory: Lungs clear to ausculation bilaterally.   Cardiovascular: regular rhythm, normal S1 and S2, no audible murmurs.   GI: soft, non-distended, bowel sounds present.  Extremities: No lower extremity edema.    LABS  CBC - WBC/HGB/HTC/PLT: 5.51/9.9/28.9/79 (03-12-23)  BMP - Na/K/Cl/Bicarb/BUN/Cr/Gluc/AG/eGFR: 130/3.6/95/26/17/0.59/235/9/106 (03-12-23)  Ca - 7.8 (03-12-23)  Phos - 2.3 (03-12-23)  Mg - 2.0 (03-12-23)  LFT - Alb/Tprot/Tbili/Dbili/AlkPhos/ALT/AST: 3.0/--/1.1/0.4/70/61/88 (03-12-23)  PT/aPTT/INR: 18.1/28.7/1.51 (03-12-23)   Thyroid Stimulating Hormone, Serum: 1.210 (03-08-23)    MEDICATIONS  MEDICATIONS  (STANDING):  acetylcysteine 20%  Inhalation 4 milliLiter(s) Inhalation every 6 hours  aMIOdarone    Tablet 400 milliGRAM(s) Oral two times a day  atorvastatin 20 milliGRAM(s) Oral at bedtime  cefepime   IVPB 2000 milliGRAM(s) IV Intermittent every 8 hours  chlorhexidine 0.12% Liquid 15 milliLiter(s) Oral Mucosa every 12 hours  chlorhexidine 2% Cloths 1 Application(s) Topical <User Schedule>  enoxaparin Injectable 40 milliGRAM(s) SubCutaneous <User Schedule>  fluticasone propionate 50 MICROgram(s)/spray Nasal Spray 1 Spray(s) Both Nostrils two times a day  insulin glargine Injectable (LANTUS) 10 Unit(s) SubCutaneous at bedtime  insulin regular  human corrective regimen sliding scale   SubCutaneous every 6 hours  insulin regular  human recombinant 2 Unit(s) SubCutaneous every 6 hours  iron sucrose IVPB 300 milliGRAM(s) IV Intermittent every 24 hours  levETIRAcetam 750 milliGRAM(s) Oral two times a day  metoprolol tartrate 12.5 milliGRAM(s) Oral once  metoprolol tartrate 25 milliGRAM(s) Oral every 12 hours  sodium chloride 3%  Inhalation 4 milliLiter(s) Inhalation every 6 hours  sucralfate 1 Gram(s) Oral every 12 hours  vancomycin  IVPB 1000 milliGRAM(s) IV Intermittent every 12 hours    MEDICATIONS  (PRN):  acetaminophen     Tablet .. 650 milliGRAM(s) Oral every 6 hours PRN Temp greater or equal to 38C (100.4F), Mild Pain (1 - 3)  albuterol/ipratropium for Nebulization 3 milliLiter(s) Nebulizer every 6 hours PRN Shortness of Breath and/or Wheezing  ondansetron Injectable 4 milliGRAM(s) IV Push every 8 hours PRN Nausea and/or Vomiting  sodium chloride 0.9% lock flush 10 milliLiter(s) IV Push every 1 hour PRN Pre/post blood products, medications, blood draw, and to maintain line patency    ASSESSMENT / RECOMMENDATIONS  Mr. Norwood is a 68-year-old male with a past medical history of type 2 diabetes mellitus, hyperlipidemia, iron deficiency anemia, tracheal stenosis (s/p tracheostomy) and glioblastoma (found to have a mass in 1/2022, s/p resection of left frontal enhancing tumor with GLEOLAN placed on 1/27/22 with pathology showing gliosarcoma, WHO grade IV s/p radiation and chemotherapy completed on 12/2022, repeat MRI showing recurrence of brain mass on 12/2022) who presented for further management, now s/p cerebral angiogram with Avastin treatment (3/7/23). Post-operative course was complicated by flash pulmonary edema, desaturation, new global aphasia, right sided weakness, lactic acidosis and hyperglycemia. Endocrinology was consulted for recommendations regarding glycemic control.     A1C: 5.8 %  BUN: 17  Creatinine: 0.59  GFR: 106  Weight: 71.214  BMI: 24.6    # Type 2 diabetes mellitus  - Blood glucose levels have been intermittently above target.   - Please continue with lantus 10 units at bedtime.   - Increase regular insulin to 6 units every 6 hours while on tube feeds.   - Continue regular insulin moderate dose sliding scale every 6 hours.  - Patient's fingerstick glucose goal is 100-180 mg/dL.      Case discussed with Dr. Salamanca. Primary team updated.       Fabrice Madrid    Endocrinology Fellow    Service Pager: [phone]

## 2023-03-12 NOTE — PROGRESS NOTE ADULT - SUBJECTIVE AND OBJECTIVE BOX
HPI:  69 y/o right handed male with pmhx of T2DM, HLD, JAGDISH, hyponatremia, tracheal stenosis s/p tracheostomy, and glioblastoma presents for cerebral angiogram with Avastin treatment.    Daughter states in January 2022, patient had onset of confusion, dizziness, and speech difficulty while in [place] and was diagnosed with brain mass. Pt evaluated upon return to the [place] at [facility] ED with CT head revealing left frontal brain mass. Pt underwent resection of left frontal enhancing tumor with GLEOLAN placed 1/27/2022. Path showed gliosarcoma, WHO grade IV, IDH wild-type, EGFR non amplified, MGMT promotor methylated. Pt was discharged to rehab and underwent radiation treatment and Temodar chemotherapy (completed 12/22). MRI 12/22 showed recurrence of brain mass and pt presents today referred by Dr. Mackenzie for trial participation cerebral angiogram with Avastin treatment.    Pt complains of dizziness. Denies headache, nausea, vomiting, weakness, numbness, chest pain, dyspnea.   (07 Mar 2023 12:30)    Hospital Course:  3/7: POD0 cerebral angiogram IA avastin. post op in cath lab hypertensive, +flash pulmonary edema w/ desaturation, given diuretics. on cpap   3/8: POD1. o/n new global aphasia, right side w/d. CTH/CTA/CTP performed. ABG o/n with metabolic acidosis, lactate 7.9 and hyperglycemic 320s. started on insulin gtt, 1L NS bolus and NS@200cc/hr. desatting on cpap, placed on full vent support. propofol for sedation. in SVT, resolved with 5 lopresor. fluids stopped due to worsening CXR. repeat lactate 12, given 1amp bicarb then started on bicarb gtt. POCUS this am with hypokinetic left ventricle, pending echo. Cards/nephro/ endo consulted for further recs. Insulin gtt dc'd. VEEG monitoring. S/p 3%Na.  SQL started tonight. S/p 10 NPH after FS of 166. steven gtt. S/p 40 IV lasix. Troponin downtrending 0.32 to 0.18. EEG negative for seizures. Pancultured spiked 101.5. UA negative. Bicarb gtt dc'd. TTE EF15-20%, akinetic apex and midsegments suggestive of takotsubo  3/9: POD2. ANA LILIA o/n neuro stable. remains sedated on propofol, on levo gtt. Propofol dc'd. s/p lasix 40 mg IV x 1. CXR L effusion improved. Started lantus 10u at bedtime per endocrine. Campuzano removed, f/u TOV.    3/10: POD3 ANA LILIA overnight. Neuro exam stable. multiple bouts of diarrhea overnight and AM. Tube feeds held. s/p albumin 250 cc +  cc for tachycardia in the setting of negative fluid status. Tolerating CPAP. Tolerating trach collar x 3 hours, switched back to CPAP overnight. Off levo gtt.   3/11: POD4 Episode of O2 desaturation to low 90s while on 40% FiO2. FiO2 increased to 60% briefly with improvement in O2, good breath sounds throughout, Pt denies SOB and CXR stable. Neuro exam stable. CXR with worsening congestion, given Lasix 40mg IV, on full vent support. Heme consulted for thrombocytopenia. Fever 101F. Episode of SVT with HR to 180s and hypotensive to 80s. Started on Steven, EKG and IV tylenol. Cards fellow called to bedside. Lopressor 5mg IV given with good resolution. Vital signs normalized. Patient remained neuro intact. Started on maintenance lopressor 12.5bid and amio gtt per cards. Given another Lasix 40mg IV at 6pm. Added regular insulin 2 units q 6 per endo.  3/12: POD5 ANA LILIA overnight. Pt remains on full vent support. Neuro exam stable.    Vital Signs Last 24 Hrs  T(C): 37.8 (11 Mar 2023 23:55), Max: 38.6 (11 Mar 2023 09:12)  T(F): 100 (11 Mar 2023 23:55), Max: 101.4 (11 Mar 2023 09:12)  HR: 95 (12 Mar 2023 00:00) (88 - 181)  BP: 105/70 (11 Mar 2023 23:00) (90/71 - 123/80)  BP(mean): 83 (11 Mar 2023 23:00) (73 - 96)  RR: 22 (12 Mar 2023 00:00) (15 - 24)  SpO2: 95% (12 Mar 2023 00:00) (91% - 99%)    Parameters below as of 12 Mar 2023 00:00  Patient On (Oxygen Delivery Method): ventilator    O2 Concentration (%): 50    I&O's Summary    10 Mar 2023 07:01  -  11 Mar 2023 07:00  --------------------------------------------------------  IN: 1281 mL / OUT: 1025 mL / NET: 256 mL    11 Mar 2023 06:01  -  12 Mar 2023 00:46  --------------------------------------------------------  IN: 1470.1 mL / OUT: 1900 mL / NET: -429.9 mL    PHYSICAL EXAM:  GEN: laying in bed, NAD  NEURO: AAOx3 (with choices nods/shakes head appropriately), FC  CNII-XII: EOM intact, PERRL, OE spont, face symmetric  Motor: MAEx4 5/5 throughout B/L UE and LE, no pronator drift  CV: RRR +S1/S2  PULM: CTAB  GI: Abd soft, NT/ND  EXT: ext warm, dry, nontender. pulses 2+ b/l  WOUND: R groin site c/d/i    TUBES/LINES:  [] Campuzano  [] Lumbar Drain  [] Wound Drains  [x] Others: R IJ    DIET:  [] NPO  [] Mechanical  [x] Tube feeds    LABS:                        9.7    6.47  )-----------( 63       ( 11 Mar 2023 02:32 )             28.0     03-11    131<L>  |  95<L>  |  16  ----------------------------<  248<H>  3.7   |  26  |  0.63    Ca    8.3<L>      11 Mar 2023 15:52  Phos  2.6     03-11  Mg     1.8     03-11      PT/INR - ( 11 Mar 2023 15:56 )   PT: 16.2 sec;   INR: 1.36          PTT - ( 11 Mar 2023 15:56 )  PTT:25.4 sec    CARDIAC MARKERS ( 11 Mar 2023 17:21 )  x     / 0.03 ng/mL / 94 U/L / x     / <1.0 ng/mL      CAPILLARY BLOOD GLUCOSE      POCT Blood Glucose.: 154 mg/dL (11 Mar 2023 23:16)  POCT Blood Glucose.: 229 mg/dL (11 Mar 2023 16:13)  POCT Blood Glucose.: 181 mg/dL (11 Mar 2023 11:06)  POCT Blood Glucose.: 141 mg/dL (11 Mar 2023 06:24)      Drug Levels: [] N/A    CSF Analysis: [] N/A      Allergies    amoxicillin (Rash)    Intolerances      MEDICATIONS:  Antibiotics:    Neuro:  acetaminophen     Tablet .. 650 milliGRAM(s) Oral every 6 hours PRN  levETIRAcetam 750 milliGRAM(s) Oral two times a day  ondansetron Injectable 4 milliGRAM(s) IV Push every 8 hours PRN    Anticoagulation:  enoxaparin Injectable 40 milliGRAM(s) SubCutaneous <User Schedule>    OTHER:  acetylcysteine 20%  Inhalation 4 milliLiter(s) Inhalation every 6 hours  albuterol/ipratropium for Nebulization 3 milliLiter(s) Nebulizer every 6 hours PRN  aMIOdarone Infusion 1 mG/Min IV Continuous <Continuous>  aMIOdarone Infusion 0.5 mG/Min IV Continuous <Continuous>  atorvastatin 20 milliGRAM(s) Oral at bedtime  chlorhexidine 0.12% Liquid 15 milliLiter(s) Oral Mucosa every 12 hours  chlorhexidine 2% Cloths 1 Application(s) Topical <User Schedule>  fluticasone propionate 50 MICROgram(s)/spray Nasal Spray 1 Spray(s) Both Nostrils two times a day  insulin glargine Injectable (LANTUS) 10 Unit(s) SubCutaneous at bedtime  insulin regular  human corrective regimen sliding scale   SubCutaneous every 6 hours  insulin regular  human recombinant 2 Unit(s) SubCutaneous every 6 hours  metoprolol tartrate 12.5 milliGRAM(s) Oral every 12 hours  phenylephrine    Infusion 0.1 MICROgram(s)/kG/Min IV Continuous <Continuous>  sodium chloride 3%  Inhalation 4 milliLiter(s) Inhalation every 6 hours  sucralfate 1 Gram(s) Oral every 12 hours    IVF:  potassium phosphate / sodium phosphate Tablet (K-PHOS No. 2) 1 Tablet(s) Oral every 12 hours    CULTURES:  Culture Results:   No growth at 3 days. (03-08 @ 17:20)  Culture Results:   No growth at 3 days. (03-08 @ 17:20)    RADIOLOGY & ADDITIONAL TESTS:      ASSESSMENT:  69 y/o right handed male with pmhx of T2DM, HLD, JAGDISH, hyponatremia, tracheal stenosis s/p tracheostomy, and glioblastoma now s/p cerebral angiogram with Avastin treatment (3/7/23). c/b cardiogenic shock, cardiomyopathy, improving.     C71.9    Abnormal electrocardiogram [ECG] [EKG]    Allergy, unspecified, initial encounter    Anemia, unspecified    Benign prostatic hyperplasia without lower urinary tract symptoms    Calculus of kidney    Cough, unspecified    Depression, unspecified    ENCOUNTER FOR ATTENT    Encounter for general adult medical examination without abnormal findings    Encounter for immunization    Encounter for immunization safety counseling    Encounter for other preprocedural examination    Encounter for screening for malignant neoplasm of prostate    Encounter for screening for other metabolic disorders    Encounter for screening for other viral diseases    ESSENTIAL (PRIMARY)    Gastro-esophageal reflux disease without esophagitis    Generalized anxiety disorder    History of Glioblastoma    Hyperlipidemia, unspecified    Hypo-osmolality and hyponatremia    Hypotension, unspecified    Iron deficiency    Iron deficiency anemia, unspecified    LONG TERM (CURRENT)    LONG TERM (CURRENT)    LONG TERM (CURRENT)    Malignant neoplasm of brain, unspecified    Nausea with vomiting, unspecified    Other constipation    Other fatigue    Other injury of unspecified body region, initial encounter    OTHER SPECIFIED DISE    Personal history of malignant neoplasm, unspecified    Personal history of other diseases of the nervous system and sense organs    Personal history of other endocrine, nutritional and metabolic disease    Personal history of other mental and behavioral disorders    Personal history of transient ischemic attack (TIA), and cerebral infarction without residual deficits    POSTPROCEDURAL SUBGL    Pruritus, unspecified    Shortness of breath    Tachycardia, unspecified    Thrombocytopenia, unspecified    Unspecified abdominal pain    Unspecified visual disturbance    Urinary calculus, unspecified    Vitamin D deficiency, unspecified    No pertinent family history in first degree relatives    FH: diabetes mellitus (Father, Mother)    FH: hyperlipidemia (Father)    FH: hypertension (Father, Mother)    Handoff    No pertinent past medical history    Diabetes mellitus    Hypertension    Glioblastoma    Type 2 diabetes mellitus    Hyperlipidemia    Iron deficiency anemia    Nephrolithiasis    Thrombocytopenia    Hyponatremia    BPH (benign prostatic hyperplasia)    No pertinent past medical history    GBM (glioblastoma multiforme)    GBM (glioblastoma multiforme)    Acute respiratory failure with hypoxia    Tracheostomy status    Acute hyponatremia    Angiogram, carotid and cerebral arteries    No significant past surgical history    No significant past surgical history    S/P craniotomy    H/O tracheostomy    No significant past surgical history    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      Neuro   - Vitals/neuro q4   - CTA/CTP/repeat CTH negative    - Pain control   - Cont home Keppra 750 BID  - MRI w/wo on stepdown     Cards  - Goal MAP > 65, off levophed gtt  - Cards following - GDMT   - TTE EF 15-20%, akinetic apex and midsegments suggestive of takotsubo, repeat 3/13   - Per cards: started on lopressor 12.5 bid and amiodarone gtt on 3/11    Pulm  - full vent support with CPAP trials   - Lasix prn   - duonebs, mucomyst, saline nebs for secretions   - Pulm following    GI  - NGT - Osmolite 1.2 @ 65 d/t diarrhea   - Bowel regimen held for loose stool, + banatrol, last BM 3/10  - Cont home protonix  - RT placed 3/10, removed 3/11    RENAL:   - Chronic hyponatremia   - voiding  - trend lactate, WNL now    HEME:  - DVT ppx: SQL, SCD L side only, 3/11 antixa 0.23  - LE dopplers + R IM calf DVT, continue serial dopplers 3/13  - heme consulted for thrombocytopenia- f/u recs     ID:   - 3/8 pancx neg  - f/u sputum cx    ENDO:   - DKA resolved    - DM: A1C 5.8, continue ISS   - Lantus 10u at bedtime, reg insulin 2u q6h    DISPO:   - ICU status, full code, dispo pending    D/W Dr. Ho and Dr. Huggins

## 2023-03-12 NOTE — PROGRESS NOTE ADULT - ATTENDING COMMENTS
HEMATOLOGY ATTENDING NOTE   I saw the patient with a family member, brother in law, at bedside and reviewed the chart, fellow's findings, assessment and plan and agree with the recommendations except as follows: In brief, patient is a 67yo man with history of glioblastoma s/p cerebral angiogram with Avastin with chronic mild variable thrombocytopenia in range of , consulted for slightly worsened thrombocytopenia of 63 -->70s after recent treatment with Avastin.  Patient also found to have a degree of iron deficiency anemia and recently newly diagnosed LE DVT only on ppx dose anticoagulation. Agree that there is no concern for a microangiopathic process without any schistocytes seen on smear and the timeline of a chronic thrombocytopenia. Monitor and transfuse supportively if needed (if platelets decrease to below 50 with invasive procedures planned.) Regarding iron deficiency also agree with assessment and treatment as delineated in the fellow's note. Regarding anticoagulation for a recent LE DVT, would hold off and consider vascular consult for IVC filter placement if there are contraindications to initiate AC present from neurosurgical perspective (i.e cerebral angiogram? VEGF inhibitor use?).

## 2023-03-12 NOTE — PROGRESS NOTE ADULT - ASSESSMENT
68M with pmhx of T2DM, HLD, JAGDISH, hyponatremia, tracheal stenosis s/p tracheostomy, and glioblastoma now s/p cerebral angiogram with Avastin treatment. Hematology consulted for thrombocytopenia.    #grade II Thrombocytopenia, Plt 63k-->79K(3/12)  Pt has had a history of thrombocytopenia with his baseline range  since the latter half of last year.   He was previously on temozolomide and RT (completed treatment in 12/2022), which coincides with his noted thrombocytopenia. He received Avastin on 3/7, and has a new flores of 63k today.  Peripheral slide reviewed - rare schistocytes 0-1/hpf,   Plasmic score 3 pts, low risk of TTP  4Ts score 2 pts, low probability of HIT  No other new medications were started that could cause DITP.  No clinical signs of DIC on exam, however, will need labs to support this. Labs reviewed, DIC ruled out.  Thrombocytopenia is most likely 2/2 recent Avastin treatment, as avastin is known to cause thrombocytopenia in up to 58% of patients (Uptodate).  Transfuse 1U plt if bleeding and platelet count <50k, if febrile and platelet count <20k, or if platelet count <10k    #DVT RLE IM calf  -new DVT, pt only on prophylactic dose lovenox  -suspect fever is 2/2 DVT  -DVT needs to be treated given risk of embolization. Recommend heparin gtt (full anticoagulation dosing) as this can be easily reversed if there are signs of bleeding, or if noted acute drop in platelets. Can transition back to anticoagulation dose lovenox if CBC remains stable, without bleeding.    #anemia  Hb 9.7, on admission 11.7, MCV 94.9  No episodes of bleeding.  -iron 13, %sat 6, ferritin 195, ok to give IV iron sucrose 300mg daily for 3 days.   -labs reviewed, no signs of hemolysis.    Pt discussed with Dr. Houser

## 2023-03-12 NOTE — CHART NOTE - NSCHARTNOTEFT_GEN_A_CORE
3/12: POD5 Pt remains on full vent support. Neuro exam stable. Spiked 101.7F fever, pan cultured. Started on vanco/cefepine for empiric coverage. Lasix 40mg IV given in afternoon. Started on IV iron x 3 days, per heme. Regular insulin increased to 6q6, per endo. Amio PO, increased lopressor to 25bid, per cards.

## 2023-03-12 NOTE — PROGRESS NOTE ADULT - ASSESSMENT
Assessment: 69 y/o M HTN, DM, chronic hyponatremia, POD 5  for IA Avastin for GBM.   Course complicated by cardiogenic shock/ takotsubo cardiomyopathy- likely secondary to hypertensive emergency in setting of neosynephrine  Resultant pulmonary edema  Severe lactic acidosis  DKA  Hyponatremia    NEURO: POD 5 s/p IA Avastin for GBM   Neurochecks Q4  CT/ CTA head & neck w/ perfusion. No LVO or evidence of perfusion deficit  Seizure disorder: Continue Keppra 750mg BID. EEG- negative. DCed  Obtain MRI brain w/ w/o contrast when stable  Analgesia: Tylenol prn.  Activity: PT/OT.     PULMONARY: Acute pulmonary edema. Likely secondary to cardiogenic shock- improved  Trach to vent. Full vent support, trial CPAP.   ABG, CXR.  Aggressive chest PT  ENT consult pending re: trach cuffed-> cuffless    CARDIOVASCULAR: Takotsubo cardiomopathy, shock, hypertensive emergency resulting in flash pulmonary edema- improving, SVT  ECHO: 3/8: Severely depressed LVEF ~ 15-20% with akinetic/apical wall and basal inferior wall hyperkinesis. Mod TR with mild pulm HTN  EKG: NSR with new septal Qs and 1mm DAVID  Shock resolved. Off pressors. Goal MAP >/=65.   SVT: Resolved with beta blocker. Continue amiodarone and metoprolol 12.5bid.   POCUS daily. Repeat formal TTE 3/13.   CXR- pulm congestion, probnp- 6000s. Lasix 40mg x1.   Cardiology following. GDMT    GASTROENTEROLOGY: Diarrhea  Diet: TFs- Osmolite at 55cc/hr  GI ppx: Sucralfate  LBM: 3/12, banatrol.  LFTs wnl. Monitor     RENAL/: Profound hyponatremia; low serum osm &  urine osm, hyperlactatemia, severe metabolic acidosis. Resolved  Na goal 125-130. Monitor BMPs.   [place] cath; monitor Is/Os  Nephrology following; appreciate recommendations  Replete lytes prn.     ENDOCRINE: Diabetes Mellitus.  DKA s/p insulin drip- resolved.   On long acting insulin 10u- titrate  A1c- 5.8, TSH- 1.2. Cortisol- 16    HEME/ONC: Thrombocytopenia & anemia- likely in setting of chronic disease & chemotherapy. New DVT present on admission. RLE IM calf.   DCT ppx: Lovenox 40mg. Anti Xa level 0.23  LLE SCD. None on R  Obtain repeat dopplers on 3/13.   Monitor CBCs  Heme following.     INFECTIOUS: Fever; unclear etiology.   Leukocytosis normalized  Blood culture NGTD  UA neg  Sputum culture:  Procal:      MISC:    SOCIAL/FAMILY:  [] awaiting [x] updated at bedside [] family meeting  Updating daughter daily.     CODE STATUS:  [x] Full Code [] DNR [] DNI [] Palliative/Comfort Care    DISPOSITION:  [x] ICU [] Stroke Unit [] Floor [] EMU [] RCU [] PCU    Time spent: 45 critical care minutes       Assessment: 67 y/o M HTN, DM, chronic hyponatremia, POD 5  for IA Avastin for GBM.   Course complicated by cardiogenic shock/ takotsubo cardiomyopathy- likely secondary to hypertensive emergency in setting of neosynephrine  Resultant pulmonary edema  Severe lactic acidosis  DKA  Hyponatremia    NEURO: POD 5 s/p IA Avastin for GBM   Neurochecks Q4  CT/ CTA head & neck w/ perfusion. No LVO or evidence of perfusion deficit  Seizure disorder: Continue Keppra 750mg BID. EEG- negative. DCed  Obtain MRI brain w/ w/o contrast when stable  Analgesia: Tylenol prn.  Activity: PT/OT.     PULMONARY: Acute pulmonary edema. Likely secondary to cardiogenic shock- improved  Trach to vent. Full vent support, trial CPAP.   ABG, CXR.  Aggressive chest PT  ENT consult pending re: trach cuffed-> cuffless    CARDIOVASCULAR: Takotsubo cardiomopathy, shock, hypertensive emergency resulting in flash pulmonary edema- improving, SVT.  ECHO: 3/8: Severely depressed LVEF ~ 15-20% with akinetic/apical wall and basal inferior wall hyperkinesis. Mod TR with mild pulm HTN  EKG: NSR with new septal Qs and 1mm DAVID  Shock resolved. Off pressors. Goal MAP >/=65.   SVT: Resolved with beta blocker. Continue amiodarone gtt; change to PO 3/12  Continue metoprolol 12.5bid.   POCUS daily. Repeat formal TTE 3/13.   CXR- pulm congestion, probnp- 6000s. Lasix 40mg x1.   Cardiology following. GDMT    GASTROENTEROLOGY: Diarrhea- improving  Diet: TFs- Osmolite at 55cc/hr  GI ppx: Sucralfate  LBM: 3/12, banatrol.  Monitor LFTs while on amiodarone    RENAL/: Profound hyponatremia; low serum osm &  urine osm, hyperlactatemia, severe metabolic acidosis. Resolved  Na goal 125-130. Monitor BMPs.   [place] cath; monitor Is/Os  Nephrology following; appreciate recommendations  Replete lytes prn.   Lasix prn    ENDOCRINE: Diabetes Mellitus.  DKA s/p insulin drip- resolved.   On long acting insulin 10u- titrate  A1c- 5.8, TSH- 1.2. Cortisol- 16    HEME/ONC: Thrombocytopenia & anemia- likely in setting of chronic disease & chemotherapy. New DVT present on admission. RLE IM calf.   DCT ppx: Lovenox 40mg. Anti Xa level 0.23  LLE SCD. None on R  Obtain repeat dopplers on 3/13.   Monitor CBCs  Heme following.   ?Mild JAGDISH. Ferrous sulfate    INFECTIOUS: Persistent fever- R/O VAP vs other  Leukocytosis normalized  Blood culture 3/8 NGTD, repeat pending.  UA neg  Sputum culture 3/10: GPC in pairs, rods- start broad spectrum Abx until speciation  Procal: pending  MRSA nares    MISC:    SOCIAL/FAMILY:  [] awaiting [x] updated at bedside [] family meeting  Updating daughter daily.     CODE STATUS:  [x] Full Code [] DNR [] DNI [] Palliative/Comfort Care    DISPOSITION:  [x] ICU [] Stroke Unit [] Floor [] EMU [] RCU [] PCU    Time spent: 45 critical care minutes       Assessment: 69 y/o M HTN, DM, chronic hyponatremia, POD 5  for IA Avastin for GBM.   Course complicated by cardiogenic shock/ takotsubo cardiomyopathy- likely secondary to hypertensive emergency in setting of neosynephrine  Resultant pulmonary edema  Severe lactic acidosis  DKA  Hyponatremia    NEURO: POD 5 s/p IA Avastin for GBM   Neurochecks Q4  CT/ CTA head & neck w/ perfusion. No LVO or evidence of perfusion deficit  Seizure disorder: Continue Keppra 750mg BID. EEG- negative. DCed  Obtain MRI brain w/ w/o contrast when stable  Analgesia: Tylenol prn.  Activity: PT/OT.     PULMONARY: Acute pulmonary edema. Likely secondary to cardiogenic shock- improved  Trach to vent. Full vent support, trial CPAP.   ABG, CXR.  Aggressive chest PT  ENT consult pending re: trach cuffed-> cuffless    CARDIOVASCULAR: Takotsubo cardiomopathy, shock, hypertensive emergency resulting in flash pulmonary edema- improving, SVT.  ECHO: 3/8: Severely depressed LVEF ~ 15-20% with akinetic/apical wall and basal inferior wall hyperkinesis. Mod TR with mild pulm HTN  EKG: NSR with new septal Qs and 1mm DAVID  Shock resolved. Off pressors. Goal MAP >/=65.   SVT: Resolved with beta blocker. Continue amiodarone gtt; change to PO 3/12  Continue metoprolol 12.5bid.   POCUS daily. Repeat formal TTE 3/13.   CXR- pulm congestion, probnp- 6000s. Lasix 40mg x1.   Cardiology following. GDMT    GASTROENTEROLOGY: Diarrhea- improving  Diet: TFs- Osmolite at 55cc/hr  GI ppx: Sucralfate  LBM: 3/12, banatrol.  Monitor LFTs while on amiodarone    RENAL/: Profound hyponatremia; low serum osm &  urine osm, hyperlactatemia, severe metabolic acidosis. Resolved  Na goal 125-130. Monitor BMPs.   [place] cath; monitor Is/Os  Nephrology following; appreciate recommendations  Replete lytes prn.   Lasix prn    ENDOCRINE: Diabetes Mellitus.  DKA s/p insulin drip- resolved.   On long acting insulin 10u- titrate  A1c- 5.8, TSH- 1.2. Cortisol- 16    HEME/ONC: Thrombocytopenia & anemia- likely in setting of chronic disease & chemotherapy. New DVT present on admission. RLE IM calf.   DCT ppx: Lovenox 40mg. Anti Xa level 0.23  LLE SCD. None on R  Obtain repeat dopplers on 3/13. If propagation, will consider full AC  Monitor CBCs  Heme following.   Mild JAGDISH. IV Ferrous sulfate    INFECTIOUS: Persistent fever- R/O VAP vs other  Leukocytosis normalized  Blood culture 3/8 NGTD, repeat pending.  UA neg  Sputum culture 3/10: GPC in pairs, rods- start broad spectrum Abx until speciation  Procal: pending  MRSA nares    MISC:    SOCIAL/FAMILY:  [] awaiting [x] updated at bedside [] family meeting  Updating daughter daily.     CODE STATUS:  [x] Full Code [] DNR [] DNI [] Palliative/Comfort Care    DISPOSITION:  [x] ICU [] Stroke Unit [] Floor [] EMU [] RCU [] PCU    Time spent: 45 critical care minutes

## 2023-03-12 NOTE — PROGRESS NOTE ADULT - SUBJECTIVE AND OBJECTIVE BOX
O:  T(C): 37.1 (03-12-23 @ 17:25), Max: 38.7 (03-12-23 @ 06:14)  HR: 87 (03-12-23 @ 18:25) (86 - 113)  BP: 112/76 (03-12-23 @ 15:45) (96/59 - 119/75)  RR: 20 (03-12-23 @ 18:25) (17 - 27)  SpO2: 100% (03-12-23 @ 18:25) (91% - 100%)  03-11-23 @ 06:01  -  03-12-23 @ 07:00  --------------------------------------------------------  IN: 1928.6 mL / OUT: 2050 mL / NET: -121.4 mL    03-12-23 @ 07:01  -  03-12-23 @ 18:54  --------------------------------------------------------  IN: 947 mL / OUT: 1000 mL / NET: -53 mL    acetaminophen     Tablet .. 650 milliGRAM(s) Oral every 6 hours PRN  acetylcysteine 20%  Inhalation 4 milliLiter(s) Inhalation every 6 hours  albuterol/ipratropium for Nebulization 3 milliLiter(s) Nebulizer every 6 hours PRN  aMIOdarone    Tablet 400 milliGRAM(s) Oral two times a day  atorvastatin 20 milliGRAM(s) Oral at bedtime  cefepime   IVPB 2000 milliGRAM(s) IV Intermittent every 8 hours  chlorhexidine 0.12% Liquid 15 milliLiter(s) Oral Mucosa every 12 hours  chlorhexidine 2% Cloths 1 Application(s) Topical <User Schedule>  enoxaparin Injectable 40 milliGRAM(s) SubCutaneous <User Schedule>  fluticasone propionate 50 MICROgram(s)/spray Nasal Spray 1 Spray(s) Both Nostrils two times a day  insulin glargine Injectable (LANTUS) 10 Unit(s) SubCutaneous at bedtime  insulin regular  human corrective regimen sliding scale   SubCutaneous every 6 hours  insulin regular  human recombinant 6 Unit(s) SubCutaneous every 6 hours  iron sucrose IVPB 300 milliGRAM(s) IV Intermittent every 24 hours  levETIRAcetam 750 milliGRAM(s) Oral two times a day  metoprolol tartrate 25 milliGRAM(s) Oral every 12 hours  ondansetron Injectable 4 milliGRAM(s) IV Push every 8 hours PRN  sodium chloride 0.9% lock flush 10 milliLiter(s) IV Push every 1 hour PRN  sodium chloride 3%  Inhalation 4 milliLiter(s) Inhalation every 6 hours  sucralfate 1 Gram(s) Oral every 12 hours  vancomycin  IVPB 1000 milliGRAM(s) IV Intermittent every 12 hours    Urinalysis with Rflx Culture (collected 03-12-23 @ 13:16)    Mode: CPAP with PS, FiO2: 40, PEEP: 7, PS: 5, MAP: 8, PIP: 13  NEURO EXAM     LABS:  Na: 130 (03-12 @ 04:28), 131 (03-11 @ 15:52), 131 (03-11 @ 02:32), 131 (03-10 @ 14:27), 126 (03-10 @ 05:45)  K: 3.6 (03-12 @ 04:28), 3.7 (03-11 @ 15:52), 4.1 (03-11 @ 02:32), 3.8 (03-10 @ 14:27), 3.7 (03-10 @ 05:45)  Cl: 95 (03-12 @ 04:28), 95 (03-11 @ 15:52), 95 (03-11 @ 02:32), 96 (03-10 @ 14:27), 92 (03-10 @ 05:45)  CO2: 26 (03-12 @ 04:28), 26 (03-11 @ 15:52), 28 (03-11 @ 02:32), 27 (03-10 @ 14:27), 29 (03-10 @ 05:45)  BUN: 17 (03-12 @ 04:28), 16 (03-11 @ 15:52), 14 (03-11 @ 02:32), 15 (03-10 @ 14:27), 15 (03-10 @ 05:45)  Cr: 0.59 (03-12 @ 04:28), 0.63 (03-11 @ 15:52), 0.62 (03-11 @ 02:32), 0.64 (03-10 @ 14:27), 0.69 (03-10 @ 05:45)  Glu: 235(03-12 @ 04:28), 248(03-11 @ 15:52), 152(03-11 @ 02:32), 149(03-10 @ 14:27), 150(03-10 @ 05:45)    Hgb: 9.9 (03-12 @ 04:28), 9.7 (03-11 @ 02:32), 10.4 (03-10 @ 05:45)  Hct: 28.9 (03-12 @ 04:28), 28.0 (03-11 @ 02:32), 30.1 (03-10 @ 05:45)  WBC: 5.51 (03-12 @ 04:28), 6.47 (03-11 @ 02:32), 7.50 (03-10 @ 05:45)  Plt: 79 (03-12 @ 04:28), 63 (03-11 @ 02:32), 76 (03-10 @ 05:45)    INR: 1.51 03-12-23 @ 04:28, 1.36 03-11-23 @ 15:56  PTT: 28.7 03-12-23 @ 04:28, 25.4 03-11-23 @ 15:56            Urinalysis with Rflx Culture (collected 12 Mar 2023 13:16)    Culture - Sputum (collected 10 Mar 2023 17:19)  Source: ET Tube ET Tube  Gram Stain (11 Mar 2023 21:21):    Rare epithelial cells    Few White blood cells    Few Gram positive cocci in pairs and clusters    Rare Gram Positive Rods    Moderate Gram Negative Rods  Preliminary Report (12 Mar 2023 09:58):    Numerous Pseudomonas aeruginosa Susceptibility to follow.    Moderate Staphylococcus aureus Susceptibility to follow.    Normal Respiratory Shani present    Culture in progress      a/p GBM, s/p IA avastin   neuro chekcs q     Brain edema, hypertonic saline                 , keep sodium in                  , BMP q 6 hr   CV : SBP goal   100-160 mmhg   cardiogenic shock, now resolved , off pressers  SVT on metoprolol 25 mg bID, and amiodarone drip, will switch to  mhg per cardiology, currently in sinus rythm  repeat TTE   lasix 40 mg lasix today for pulm congestion   Pulm: acute respiratory failure, trached, ABG and adjust vent settings accordingly   Mode: CPAP with PS  FiO2: 40  PEEP: 7  PS: 5  MAP: 8  PIP: 13    GI: on TF, at goal   Renal: SIADH   ID ferbile, pseudomonas and staph, on vancomycin and cefepime, check van through   Endo: DM, target sugar 120-180   lantus 10 un , NPH 6 units q 6 hr   Hem: SCD,   hold chemoprophylaxis as POD     35 critical care time as patient is at risk for brain henrniation, ICP crisis, seizures, ICH  O: febrile, pancultured started on cefepime and vancomycin and lasix   T(C): 37.1 (03-12-23 @ 17:25), Max: 38.7 (03-12-23 @ 06:14)  HR: 87 (03-12-23 @ 18:25) (86 - 113)  BP: 112/76 (03-12-23 @ 15:45) (96/59 - 119/75)  RR: 20 (03-12-23 @ 18:25) (17 - 27)  SpO2: 100% (03-12-23 @ 18:25) (91% - 100%)  03-11-23 @ 06:01  -  03-12-23 @ 07:00  --------------------------------------------------------  IN: 1928.6 mL / OUT: 2050 mL / NET: -121.4 mL    03-12-23 @ 07:01  -  03-12-23 @ 18:54  --------------------------------------------------------  IN: 947 mL / OUT: 1000 mL / NET: -53 mL    acetaminophen     Tablet .. 650 milliGRAM(s) Oral every 6 hours PRN  acetylcysteine 20%  Inhalation 4 milliLiter(s) Inhalation every 6 hours  albuterol/ipratropium for Nebulization 3 milliLiter(s) Nebulizer every 6 hours PRN  aMIOdarone    Tablet 400 milliGRAM(s) Oral two times a day  atorvastatin 20 milliGRAM(s) Oral at bedtime  cefepime   IVPB 2000 milliGRAM(s) IV Intermittent every 8 hours  chlorhexidine 0.12% Liquid 15 milliLiter(s) Oral Mucosa every 12 hours  chlorhexidine 2% Cloths 1 Application(s) Topical <User Schedule>  enoxaparin Injectable 40 milliGRAM(s) SubCutaneous <User Schedule>  fluticasone propionate 50 MICROgram(s)/spray Nasal Spray 1 Spray(s) Both Nostrils two times a day  insulin glargine Injectable (LANTUS) 10 Unit(s) SubCutaneous at bedtime  insulin regular  human corrective regimen sliding scale   SubCutaneous every 6 hours  insulin regular  human recombinant 6 Unit(s) SubCutaneous every 6 hours  iron sucrose IVPB 300 milliGRAM(s) IV Intermittent every 24 hours  levETIRAcetam 750 milliGRAM(s) Oral two times a day  metoprolol tartrate 25 milliGRAM(s) Oral every 12 hours  ondansetron Injectable 4 milliGRAM(s) IV Push every 8 hours PRN  sodium chloride 0.9% lock flush 10 milliLiter(s) IV Push every 1 hour PRN  sodium chloride 3%  Inhalation 4 milliLiter(s) Inhalation every 6 hours  sucralfate 1 Gram(s) Oral every 12 hours  vancomycin  IVPB 1000 milliGRAM(s) IV Intermittent every 12 hours    Urinalysis with Rflx Culture (collected 03-12-23 @ 13:16)    Mode: CPAP with PS, FiO2: 40, PEEP: 7, PS: 5, MAP: 8, PIP: 13  NEURO EXAM     LABS:  Na: 130 (03-12 @ 04:28), 131 (03-11 @ 15:52), 131 (03-11 @ 02:32), 131 (03-10 @ 14:27), 126 (03-10 @ 05:45)  K: 3.6 (03-12 @ 04:28), 3.7 (03-11 @ 15:52), 4.1 (03-11 @ 02:32), 3.8 (03-10 @ 14:27), 3.7 (03-10 @ 05:45)  Cl: 95 (03-12 @ 04:28), 95 (03-11 @ 15:52), 95 (03-11 @ 02:32), 96 (03-10 @ 14:27), 92 (03-10 @ 05:45)  CO2: 26 (03-12 @ 04:28), 26 (03-11 @ 15:52), 28 (03-11 @ 02:32), 27 (03-10 @ 14:27), 29 (03-10 @ 05:45)  BUN: 17 (03-12 @ 04:28), 16 (03-11 @ 15:52), 14 (03-11 @ 02:32), 15 (03-10 @ 14:27), 15 (03-10 @ 05:45)  Cr: 0.59 (03-12 @ 04:28), 0.63 (03-11 @ 15:52), 0.62 (03-11 @ 02:32), 0.64 (03-10 @ 14:27), 0.69 (03-10 @ 05:45)  Glu: 235(03-12 @ 04:28), 248(03-11 @ 15:52), 152(03-11 @ 02:32), 149(03-10 @ 14:27), 150(03-10 @ 05:45)    Hgb: 9.9 (03-12 @ 04:28), 9.7 (03-11 @ 02:32), 10.4 (03-10 @ 05:45)  Hct: 28.9 (03-12 @ 04:28), 28.0 (03-11 @ 02:32), 30.1 (03-10 @ 05:45)  WBC: 5.51 (03-12 @ 04:28), 6.47 (03-11 @ 02:32), 7.50 (03-10 @ 05:45)  Plt: 79 (03-12 @ 04:28), 63 (03-11 @ 02:32), 76 (03-10 @ 05:45)    INR: 1.51 03-12-23 @ 04:28, 1.36 03-11-23 @ 15:56  PTT: 28.7 03-12-23 @ 04:28, 25.4 03-11-23 @ 15:56            Urinalysis with Rflx Culture (collected 12 Mar 2023 13:16)    Culture - Sputum (collected 10 Mar 2023 17:19)  Source: ET Tube ET Tube  Gram Stain (11 Mar 2023 21:21):    Rare epithelial cells    Few White blood cells    Few Gram positive cocci in pairs and clusters    Rare Gram Positive Rods    Moderate Gram Negative Rods  Preliminary Report (12 Mar 2023 09:58):    Numerous Pseudomonas aeruginosa Susceptibility to follow.    Moderate Staphylococcus aureus Susceptibility to follow.    Normal Respiratory Shani present    Culture in progress      a/p GBM, s/p IA avastin   neuro chekcs q 4 hr   keppra 750 mg q 12 hr   CV : SBP goal   110-140 mmhg   cardiogenic shock, from stress induced cardio,myopathy, now resolved , off pressers  SVT on metoprolol 25 mg bID, and amiodarone drip, will switch to  mg BID  per cardiology, currently in sinus rythm  repeat TTE   lasix 40 mg lasix today for pulm congestion   Pulm: acute respiratory failure, trached, ABG and adjust vent settings accordingly   Mode: CPAP with PS  FiO2: 40  PEEP: 7  PS: 5  MAP: 8  PIP: 13  chest xray congested   GI: on TF, at goal   Renal: SIADH   ID febrile pseudomonas and staph arielle, on vancomycin and cefepime, check van through   Endo: DM, target sugar 120-180   lantus 10 un , NPH 6 units q 6 hr   Hem:  DVT  started on lovenox 40 mg sc qhs, contraindicated to  anticoagulate per NS    thrombocytopenia, hem on consult, could be from avastin     35 critical care time as patient is at risk for brain henrniation, ICP crisis, seizures, ICH  O: febrile, pancultured started on cefepime and vancomycin and lasix   T(C): 37.1 (03-12-23 @ 17:25), Max: 38.7 (03-12-23 @ 06:14)  HR: 87 (03-12-23 @ 18:25) (86 - 113)  BP: 112/76 (03-12-23 @ 15:45) (96/59 - 119/75)  RR: 20 (03-12-23 @ 18:25) (17 - 27)  SpO2: 100% (03-12-23 @ 18:25) (91% - 100%)  03-11-23 @ 06:01  -  03-12-23 @ 07:00  --------------------------------------------------------  IN: 1928.6 mL / OUT: 2050 mL / NET: -121.4 mL    03-12-23 @ 07:01  -  03-12-23 @ 18:54  --------------------------------------------------------  IN: 947 mL / OUT: 1000 mL / NET: -53 mL    acetaminophen     Tablet .. 650 milliGRAM(s) Oral every 6 hours PRN  acetylcysteine 20%  Inhalation 4 milliLiter(s) Inhalation every 6 hours  albuterol/ipratropium for Nebulization 3 milliLiter(s) Nebulizer every 6 hours PRN  aMIOdarone    Tablet 400 milliGRAM(s) Oral two times a day  atorvastatin 20 milliGRAM(s) Oral at bedtime  cefepime   IVPB 2000 milliGRAM(s) IV Intermittent every 8 hours  chlorhexidine 0.12% Liquid 15 milliLiter(s) Oral Mucosa every 12 hours  chlorhexidine 2% Cloths 1 Application(s) Topical <User Schedule>  enoxaparin Injectable 40 milliGRAM(s) SubCutaneous <User Schedule>  fluticasone propionate 50 MICROgram(s)/spray Nasal Spray 1 Spray(s) Both Nostrils two times a day  insulin glargine Injectable (LANTUS) 10 Unit(s) SubCutaneous at bedtime  insulin regular  human corrective regimen sliding scale   SubCutaneous every 6 hours  insulin regular  human recombinant 6 Unit(s) SubCutaneous every 6 hours  iron sucrose IVPB 300 milliGRAM(s) IV Intermittent every 24 hours  levETIRAcetam 750 milliGRAM(s) Oral two times a day  metoprolol tartrate 25 milliGRAM(s) Oral every 12 hours  ondansetron Injectable 4 milliGRAM(s) IV Push every 8 hours PRN  sodium chloride 0.9% lock flush 10 milliLiter(s) IV Push every 1 hour PRN  sodium chloride 3%  Inhalation 4 milliLiter(s) Inhalation every 6 hours  sucralfate 1 Gram(s) Oral every 12 hours  vancomycin  IVPB 1000 milliGRAM(s) IV Intermittent every 12 hours    Urinalysis with Rflx Culture (collected 03-12-23 @ 13:16)    Mode: CPAP with PS, FiO2: 40, PEEP: 7, PS: 5, MAP: 8, PIP: 13  NEURO EXAM   awake, alert oriented x 3, BRUNO ,intact EOM, 5/5 all over   LABS:  Na: 130 (03-12 @ 04:28), 131 (03-11 @ 15:52), 131 (03-11 @ 02:32), 131 (03-10 @ 14:27), 126 (03-10 @ 05:45)  K: 3.6 (03-12 @ 04:28), 3.7 (03-11 @ 15:52), 4.1 (03-11 @ 02:32), 3.8 (03-10 @ 14:27), 3.7 (03-10 @ 05:45)  Cl: 95 (03-12 @ 04:28), 95 (03-11 @ 15:52), 95 (03-11 @ 02:32), 96 (03-10 @ 14:27), 92 (03-10 @ 05:45)  CO2: 26 (03-12 @ 04:28), 26 (03-11 @ 15:52), 28 (03-11 @ 02:32), 27 (03-10 @ 14:27), 29 (03-10 @ 05:45)  BUN: 17 (03-12 @ 04:28), 16 (03-11 @ 15:52), 14 (03-11 @ 02:32), 15 (03-10 @ 14:27), 15 (03-10 @ 05:45)  Cr: 0.59 (03-12 @ 04:28), 0.63 (03-11 @ 15:52), 0.62 (03-11 @ 02:32), 0.64 (03-10 @ 14:27), 0.69 (03-10 @ 05:45)  Glu: 235(03-12 @ 04:28), 248(03-11 @ 15:52), 152(03-11 @ 02:32), 149(03-10 @ 14:27), 150(03-10 @ 05:45)    Hgb: 9.9 (03-12 @ 04:28), 9.7 (03-11 @ 02:32), 10.4 (03-10 @ 05:45)  Hct: 28.9 (03-12 @ 04:28), 28.0 (03-11 @ 02:32), 30.1 (03-10 @ 05:45)  WBC: 5.51 (03-12 @ 04:28), 6.47 (03-11 @ 02:32), 7.50 (03-10 @ 05:45)  Plt: 79 (03-12 @ 04:28), 63 (03-11 @ 02:32), 76 (03-10 @ 05:45)    INR: 1.51 03-12-23 @ 04:28, 1.36 03-11-23 @ 15:56  PTT: 28.7 03-12-23 @ 04:28, 25.4 03-11-23 @ 15:56            Urinalysis with Rflx Culture (collected 12 Mar 2023 13:16)    Culture - Sputum (collected 10 Mar 2023 17:19)  Source: ET Tube ET Tube  Gram Stain (11 Mar 2023 21:21):    Rare epithelial cells    Few White blood cells    Few Gram positive cocci in pairs and clusters    Rare Gram Positive Rods    Moderate Gram Negative Rods  Preliminary Report (12 Mar 2023 09:58):    Numerous Pseudomonas aeruginosa Susceptibility to follow.    Moderate Staphylococcus aureus Susceptibility to follow.    Normal Respiratory Shani present    Culture in progress      a/p GBM, s/p IA avastin   neuro chekcs q 4 hr   keppra 750 mg q 12 hr   CV : SBP goal   110-140 mmhg   cardiogenic shock, from stress induced cardio,myopathy, now resolved , off pressers  SVT on metoprolol 25 mg bID, and amiodarone drip, will switch to  mg BID  per cardiology, currently in sinus rythm  repeat TTE   lasix 40 mg lasix today for pulm congestion   Pulm: acute respiratory failure, trached, ABG and adjust vent settings accordingly   Mode: CPAP with PS  FiO2: 40  PEEP: 7  PS: 5  MAP: 8  PIP: 13  chest xray congested   GI: on TF, at goal   Renal: SIADH   ID febrile pseudomonas and staph bonch, on vancomycin and cefepime, check van through   Endo: DM, target sugar 120-180   lantus 10 un , NPH 6 units q 6 hr   Hem:  DVT  started on lovenox 40 mg sc qhs, contraindicated to  anticoagulate per NS    thrombocytopenia, hem on consult, could be from avastin     35 critical care time as patient is at risk for brain henrniation, seizures, ICH

## 2023-03-12 NOTE — PROGRESS NOTE ADULT - ATTENDING COMMENTS
24 hour feeds continue. Glucose level [phone] last night and today [phone]. I agree with increasing Regular Insulin to 6 units every 6 hours.

## 2023-03-12 NOTE — PROGRESS NOTE ADULT - SUBJECTIVE AND OBJECTIVE BOX
INTERVAL HISTORY: HPI:  67 y/o right handed M with PMH of T2DM, HLD, JAGDISH, hyponatremia, tracheal stenosis s/p tracheostomy, and glioblastoma presents for cerebral angiogram with Avastin treatment.  Daughter states in January 2022, patient had onset of confusion, dizziness, and speech difficulty while in [place] and was diagnosed with brain mass. Pt evaluated upon return to the [place] at [facility] ED with CT head revealing left frontal brain mass. Pt underwent resection of left frontal enhancing tumor with GLEOLAN placed 1/27/2022. Path showed gliosarcoma, WHO grade IV, IDH wild-type, EGFR non amplified, MGMT promotor methylated. Pt was discharged to rehab and underwent radiation treatment and Temodar chemotherapy (completed 12/22). MRI 12/22 showed recurrence of brain mass. Pt presented 3/7 referred by Dr. Mackenzie for trial participation cerebral angiogram with Avastin treatment.    Pt complains of dizziness. Denies headache, nausea, vomiting, weakness, numbness, chest pain, dyspnea.   (07 Mar 2023 12:30)    24HOUR EVENTS:   Shock improving. Weaning pressors. Follows commands.     HOSP COURSE:   3/7: POD 0 s/p cerebral angiogram IA avastin. Pt hypertensive after being given neosynephrine. Developed flash pulmonary edema w/ desaturation, given diuretics.    3/8: POD1. Overnight, new global aphasia, right side only withdrawing to noxious stim.  CTH/CTA/CTP performed. No LVO. Severe metabolic lactic acidosis, pulmonary edema, hyponatremia and fluid overload. Started on bicarb gtt. Diuresced.   Evidence of DKA-> on insulin gtt.   POCUS this am; B lines L>R, severely hypokinetic left ventricle. Concern for cardiogenic shock. Cardiology consulted stat.   3/9: Shock improving. Weaning pressors. Follows commands.   3/10: Trialed trach collar- hypoxic in evening. 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  Type 2 diabetes mellitus  Hyperlipidemia  Iron deficiency anemia  Nephrolithiasis  Thrombocytopenia  Hyponatremia  BPH (benign prostatic hyperplasia)  S/P craniotomy  H/O tracheostomy    REVIEW OF SYSTEMS: [x] Unable to Assess due to neurologic exam   [ ] All ROS addressed below are non-contributory, except:  Neuro: [ ] Headache [ ] Back pain [ ] Numbness [ ] Weakness [ ] Ataxia [ ] Dizziness [ ] Aphasia [ ] Dysarthria [ ] Visual disturbance  Resp: [ ] Shortness of breath/dyspnea, [ ] Orthopnea [ ] Cough  CV: [ ] Chest pain [ ] Palpitation [ ] Lightheadedness [ ] Syncope  Renal: [ ] Thirst [ ] Edema  GI: [ ] Nausea [ ] Emesis [ ] Abdominal pain [ ] Constipation [ ] Diarrhea  Hem: [ ] Hematemesis [ ] bright red blood per rectum  ID: [ ] Fever [ ] Chills [ ] Dysuria  ENT: [ ] Rhinorrhea        ICU Vital Signs Last 24 Hrs  T(C): 38.7 (12 Mar 2023 06:14), Max: 38.7 (12 Mar 2023 06:14)  T(F): 101.7 (12 Mar 2023 06:14), Max: 101.7 (12 Mar 2023 06:14)  HR: 101 (12 Mar 2023 07:00) (88 - 181)  BP: 119/75 (12 Mar 2023 07:00) (90/71 - 123/80)  BP(mean): 92 (12 Mar 2023 07:00) (73 - 96)  ABP: 132/67 (12 Mar 2023 07:00) (89/59 - 150/71)  ABP(mean): 87 (12 Mar 2023 07:00) (67 - 95)  RR: 27 (12 Mar 2023 07:00) (16 - 27)  SpO2: 96% (12 Mar 2023 07:00) (91% - 99%)      03-11-23 @ 06:01  -  03-12-23 @ 07:00  --------------------------------------------------------  IN: 1928.6 mL / OUT: 2050 mL / NET: -121.4 mL    03-12-23 @ 07:01  -  03-12-23 @ 07:25  --------------------------------------------------------  IN: 81.7 mL / OUT: 0 mL / NET: 81.7 mL      Mode: AC/ CMV (Assist Control/ Continuous Mandatory Ventilation), RR (machine): 12, TV (machine): 400, FiO2: 50, PEEP: 8, ITime: 1, MAP: 9, PIP: 15      PHYSICAL EXAM:  General: Calm  Neurological: A/Ox3 by nodding and mouthing, no neglect, comprehension intact  CN: PERRL 3mm, EOMI and no ptosis bilaterally, face symmetric  Motor: Power 5/5 in bilateral upper and 5/5 in bilateral lower extremities  HEENT: +Trach in place.   Pulmonary: Diminshed breath sounds L>R  Cardiovascular: S1, S2, RRR  Gastrointestinal: Soft, nontender, distended   Extremities: No calf tenderness   Incision: CDI no hematoma, DP +w/ doppler       MEDICATIONS:   acetaminophen     Tablet .. 650 milliGRAM(s) Oral every 6 hours PRN  acetylcysteine 20%  Inhalation 4 milliLiter(s) Inhalation every 6 hours  albuterol/ipratropium for Nebulization 3 milliLiter(s) Nebulizer every 6 hours PRN  aMIOdarone Infusion 0.5 mG/Min (16.7 mL/Hr) IV Continuous <Continuous>  atorvastatin 20 milliGRAM(s) Oral at bedtime  chlorhexidine 0.12% Liquid 15 milliLiter(s) Oral Mucosa every 12 hours  chlorhexidine 2% Cloths 1 Application(s) Topical <User Schedule>  enoxaparin Injectable 40 milliGRAM(s) SubCutaneous <User Schedule>  fluticasone propionate 50 MICROgram(s)/spray Nasal Spray 1 Spray(s) Both Nostrils two times a day  insulin glargine Injectable (LANTUS) 10 Unit(s) SubCutaneous at bedtime  insulin regular  human corrective regimen sliding scale   SubCutaneous every 6 hours  insulin regular  human recombinant 2 Unit(s) SubCutaneous every 6 hours  levETIRAcetam 750 milliGRAM(s) Oral two times a day  metoprolol tartrate 12.5 milliGRAM(s) Oral every 12 hours  ondansetron Injectable 4 milliGRAM(s) IV Push every 8 hours PRN  phenylephrine    Infusion 0.1 MICROgram(s)/kG/Min (2.67 mL/Hr) IV Continuous <Continuous>  potassium chloride   Powder 40 milliEquivalent(s) Oral once  potassium phosphate / sodium phosphate Powder (PHOS-NaK) 1 Packet(s) Oral once  sodium chloride 0.9% lock flush 10 milliLiter(s) IV Push every 1 hour PRN  sodium chloride 3%  Inhalation 4 milliLiter(s) Inhalation every 6 hours  sucralfate 1 Gram(s) Oral every 12 hours    LABS:                     9.9    5.51  )-----------( 79       ( 12 Mar 2023 04:28 )             28.9     03-12    130<L>  |  95<L>  |  17  ----------------------------<  235<H>  3.6   |  26  |  0.59    Ca    7.8<L>      12 Mar 2023 04:28  Phos  2.3     03-12  Mg     2.0     03-12        ABG - ( 12 Mar 2023 04:24 )  pH, Arterial: 7.48  pH, Blood: x     /  pCO2: 36    /  pO2: 138   / HCO3: 27    / Base Excess: 3.4   /  SaO2: 99.0                             INTERVAL HISTORY: HPI:  67 y/o right handed M with PMH of T2DM, HLD, JAGDISH, hyponatremia, tracheal stenosis s/p tracheostomy, and glioblastoma presents for cerebral angiogram with Avastin treatment.  Daughter states in January 2022, patient had onset of confusion, dizziness, and speech difficulty while in [place] and was diagnosed with brain mass. Pt evaluated upon return to the [place] at [facility] ED with CT head revealing left frontal brain mass. Pt underwent resection of left frontal enhancing tumor with GLEOLAN placed 1/27/2022. Path showed gliosarcoma, WHO grade IV, IDH wild-type, EGFR non amplified, MGMT promotor methylated. Pt was discharged to rehab and underwent radiation treatment and Temodar chemotherapy (completed 12/22). MRI 12/22 showed recurrence of brain mass. Pt presented 3/7 referred by Dr. Mackenzie for trial participation cerebral angiogram with Avastin treatment.    Pt complains of dizziness. Denies headache, nausea, vomiting, weakness, numbness, chest pain, dyspnea.   (07 Mar 2023 12:30)    24HOUR EVENTS:   Shock improving. Weaning pressors. Follows commands.     HOSP COURSE:   3/7: POD 0 s/p cerebral angiogram IA avastin. Pt hypertensive after being given neosynephrine. Developed flash pulmonary edema w/ desaturation, given diuretics.    3/8: POD1. Overnight, new global aphasia, right side only withdrawing to noxious stim.  CTH/CTA/CTP performed. No LVO. Severe metabolic lactic acidosis, pulmonary edema, hyponatremia and fluid overload. Started on bicarb gtt. Diuresced.   Evidence of DKA-> on insulin gtt.   POCUS this am; B lines L>R, severely hypokinetic left ventricle. Concern for cardiogenic shock. Cardiology consulted stat.   3/9: Shock improving. Weaning pressors. Follows commands.   3/10: Trialed trach collar- hypoxic in evening. 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  Type 2 diabetes mellitus  Hyperlipidemia  Iron deficiency anemia  Nephrolithiasis  Thrombocytopenia  Hyponatremia  BPH (benign prostatic hyperplasia)  S/P craniotomy  H/O tracheostomy    REVIEW OF SYSTEMS: [x] Unable to Assess due to neurologic exam   [ ] All ROS addressed below are non-contributory, except:  Neuro: [ ] Headache [ ] Back pain [ ] Numbness [ ] Weakness [ ] Ataxia [ ] Dizziness [ ] Aphasia [ ] Dysarthria [ ] Visual disturbance  Resp: [ ] Shortness of breath/dyspnea, [ ] Orthopnea [ ] Cough  CV: [ ] Chest pain [ ] Palpitation [ ] Lightheadedness [ ] Syncope  Renal: [ ] Thirst [ ] Edema  GI: [ ] Nausea [ ] Emesis [ ] Abdominal pain [ ] Constipation [ ] Diarrhea  Hem: [ ] Hematemesis [ ] bright red blood per rectum  ID: [ ] Fever [ ] Chills [ ] Dysuria  ENT: [ ] Rhinorrhea      ICU Vital Signs Last 24 Hrs  T(C): 38.7 (12 Mar 2023 06:14), Max: 38.7 (12 Mar 2023 06:14)  T(F): 101.7 (12 Mar 2023 06:14), Max: 101.7 (12 Mar 2023 06:14)  HR: 101 (12 Mar 2023 07:00) (88 - 181)  BP: 119/75 (12 Mar 2023 07:00) (90/71 - 123/80)  BP(mean): 92 (12 Mar 2023 07:00) (73 - 96)  ABP: 132/67 (12 Mar 2023 07:00) (89/59 - 150/71)  ABP(mean): 87 (12 Mar 2023 07:00) (67 - 95)  RR: 27 (12 Mar 2023 07:00) (16 - 27)  SpO2: 96% (12 Mar 2023 07:00) (91% - 99%)      03-11-23 @ 06:01  -  03-12-23 @ 07:00  --------------------------------------------------------  IN: 1928.6 mL / OUT: 2050 mL / NET: -121.4 mL    03-12-23 @ 07:01  -  03-12-23 @ 07:25  --------------------------------------------------------  IN: 81.7 mL / OUT: 0 mL / NET: 81.7 mL      Mode: AC/ CMV (Assist Control/ Continuous Mandatory Ventilation), RR (machine): 12, TV (machine): 400, FiO2: 50, PEEP: 8, ITime: 1, MAP: 9, PIP: 15      PHYSICAL EXAM:  General: Calm  Neurological: A/Ox3 by nodding and mouthing, no neglect, comprehension intact  CN: PERRL 3mm, EOMI and no ptosis bilaterally, face symmetric  Motor: Power 5/5 in bilateral upper and 5/5 in bilateral lower extremities  HEENT: +Trach in place.   Pulmonary: Diminshed breath sounds L>R  Cardiovascular: S1, S2, RRR  Gastrointestinal: Soft, nontender, distended   Extremities: No calf tenderness   Incision: CDI no hematoma, DP +w/ doppler       MEDICATIONS:   acetaminophen     Tablet .. 650 milliGRAM(s) Oral every 6 hours PRN  acetylcysteine 20%  Inhalation 4 milliLiter(s) Inhalation every 6 hours  albuterol/ipratropium for Nebulization 3 milliLiter(s) Nebulizer every 6 hours PRN  aMIOdarone Infusion 0.5 mG/Min (16.7 mL/Hr) IV Continuous <Continuous>  atorvastatin 20 milliGRAM(s) Oral at bedtime  chlorhexidine 0.12% Liquid 15 milliLiter(s) Oral Mucosa every 12 hours  chlorhexidine 2% Cloths 1 Application(s) Topical <User Schedule>  enoxaparin Injectable 40 milliGRAM(s) SubCutaneous <User Schedule>  fluticasone propionate 50 MICROgram(s)/spray Nasal Spray 1 Spray(s) Both Nostrils two times a day  insulin glargine Injectable (LANTUS) 10 Unit(s) SubCutaneous at bedtime  insulin regular  human corrective regimen sliding scale   SubCutaneous every 6 hours  insulin regular  human recombinant 2 Unit(s) SubCutaneous every 6 hours  levETIRAcetam 750 milliGRAM(s) Oral two times a day  metoprolol tartrate 12.5 milliGRAM(s) Oral every 12 hours  ondansetron Injectable 4 milliGRAM(s) IV Push every 8 hours PRN  phenylephrine    Infusion 0.1 MICROgram(s)/kG/Min (2.67 mL/Hr) IV Continuous <Continuous>  potassium chloride   Powder 40 milliEquivalent(s) Oral once  potassium phosphate / sodium phosphate Powder (PHOS-NaK) 1 Packet(s) Oral once  sodium chloride 0.9% lock flush 10 milliLiter(s) IV Push every 1 hour PRN  sodium chloride 3%  Inhalation 4 milliLiter(s) Inhalation every 6 hours  sucralfate 1 Gram(s) Oral every 12 hours    LABS:                     9.9    5.51  )-----------( 79       ( 12 Mar 2023 04:28 )             28.9     03-12    130<L>  |  95<L>  |  17  ----------------------------<  235<H>  3.6   |  26  |  0.59    Ca    7.8<L>      12 Mar 2023 04:28  Phos  2.3     03-12  Mg     2.0     03-12        ABG - ( 12 Mar 2023 04:24 )  pH, Arterial: 7.48  pH, Blood: x     /  pCO2: 36    /  pO2: 138   / HCO3: 27    / Base Excess: 3.4   /  SaO2: 99.0

## 2023-03-12 NOTE — PROGRESS NOTE ADULT - SUBJECTIVE AND OBJECTIVE BOX
INTERVAL HPI/OVERNIGHT EVENTS:    SUBJECTIVE: Patient seen and examined at bedside.    VITAL SIGNS:  ICU Vital Signs Last 24 Hrs  T(C): 37.1 (12 Mar 2023 13:55), Max: 38.7 (12 Mar 2023 06:14)  T(F): 98.7 (12 Mar 2023 13:55), Max: 101.7 (12 Mar 2023 06:14)  HR: 100 (12 Mar 2023 14:00) (95 - 113)  BP: 119/75 (12 Mar 2023 07:00) (96/59 - 119/75)  BP(mean): 92 (12 Mar 2023 07:00) (73 - 92)  ABP: 127/64 (12 Mar 2023 14:00) (100/58 - 141/74)  ABP(mean): 84 (12 Mar 2023 14:00) (73 - 95)  RR: 17 (12 Mar 2023 14:00) (16 - 27)  SpO2: 99% (12 Mar 2023 14:00) (91% - 99%)    O2 Parameters below as of 12 Mar 2023 14:00  Patient On (Oxygen Delivery Method): ventilator    O2 Concentration (%): 50      Mode: AC/ CMV (Assist Control/ Continuous Mandatory Ventilation), RR (machine): 12, TV (machine): 400, FiO2: 40, PEEP: 7, ITime: 1, MAP: 7, PIP: 16     @ 06:  -  12 @ 07:00  --------------------------------------------------------  IN: 1928.6 mL / OUT: 2050 mL / NET: -121.4 mL     @ 07:01  -  12 @ 14:59  --------------------------------------------------------  IN: 81.7 mL / OUT: 0 mL / NET: 81.7 mL      CAPILLARY BLOOD GLUCOSE      POCT Blood Glucose.: 204 mg/dL (12 Mar 2023 11:21)      PHYSICAL EXAM:    Constitutional: NAD  HEENT: NC/AT; PERRL, anicteric sclera; MMM  Neck: supple, no JVD, +trach  Cardiovascular: +S1/S2, tachycardic  Respiratory: CTA B/L, no W/R/R  Gastrointestinal: abdomen soft, NT/ND; no rebound or guarding; +BSx4  Genitourinary: no suprapubic tenderness or fullness  Extremities: WWP; no LE edema; no clubbing or cyanosis  Vascular: 2+ radial, DP/PT and femoral pulses B/L  Dermatologic: normal color and turgor; no visible rashes  Neurological: nonfocal    MEDICATIONS:  MEDICATIONS  (STANDING):  acetylcysteine 20%  Inhalation 4 milliLiter(s) Inhalation every 6 hours  aMIOdarone    Tablet 400 milliGRAM(s) Oral two times a day  atorvastatin 20 milliGRAM(s) Oral at bedtime  cefepime   IVPB      chlorhexidine 0.12% Liquid 15 milliLiter(s) Oral Mucosa every 12 hours  chlorhexidine 2% Cloths 1 Application(s) Topical <User Schedule>  enoxaparin Injectable 40 milliGRAM(s) SubCutaneous <User Schedule>  fluticasone propionate 50 MICROgram(s)/spray Nasal Spray 1 Spray(s) Both Nostrils two times a day  insulin glargine Injectable (LANTUS) 10 Unit(s) SubCutaneous at bedtime  insulin regular  human corrective regimen sliding scale   SubCutaneous every 6 hours  insulin regular  human recombinant 2 Unit(s) SubCutaneous every 6 hours  levETIRAcetam 750 milliGRAM(s) Oral two times a day  metoprolol tartrate 25 milliGRAM(s) Oral every 12 hours  sodium chloride 3%  Inhalation 4 milliLiter(s) Inhalation every 6 hours  sucralfate 1 Gram(s) Oral every 12 hours  vancomycin  IVPB 1000 milliGRAM(s) IV Intermittent every 12 hours    MEDICATIONS  (PRN):  acetaminophen     Tablet .. 650 milliGRAM(s) Oral every 6 hours PRN Temp greater or equal to 38C (100.4F), Mild Pain (1 - 3)  albuterol/ipratropium for Nebulization 3 milliLiter(s) Nebulizer every 6 hours PRN Shortness of Breath and/or Wheezing  ondansetron Injectable 4 milliGRAM(s) IV Push every 8 hours PRN Nausea and/or Vomiting  sodium chloride 0.9% lock flush 10 milliLiter(s) IV Push every 1 hour PRN Pre/post blood products, medications, blood draw, and to maintain line patency      ALLERGIES:  Allergies    amoxicillin (Rash)    Intolerances        LABS:                        9.9    5.51  )-----------( 79       ( 12 Mar 2023 04:28 )             28.9     03-12    130<L>  |  95<L>  |  17  ----------------------------<  235<H>  3.6   |  26  |  0.59    Ca    7.8<L>      12 Mar 2023 04:28  Phos  2.3     03-12  Mg     2.0     03-12    TPro  5.4<L>  /  Alb  3.0<L>  /  TBili  1.1  /  DBili  0.4<H>  /  AST  88<H>  /  ALT  61<H>  /  AlkPhos  70  03-12    PT/INR - ( 12 Mar 2023 04:28 )   PT: 18.1 sec;   INR: 1.51          PTT - ( 12 Mar 2023 04:28 )  PTT:28.7 sec  Urinalysis Basic - ( 12 Mar 2023 13:16 )    Color: Yellow / Appearance: Clear / S.025 / pH: x  Gluc: x / Ketone: Trace mg/dL  / Bili: Negative / Urobili: 4.0 E.U./dL   Blood: x / Protein: 30 mg/dL / Nitrite: POSITIVE   Leuk Esterase: NEGATIVE / RBC: > 10 /HPF / WBC < 5 /HPF   Sq Epi: x / Non Sq Epi: 0-5 /HPF / Bacteria: Present /HPF        RADIOLOGY & ADDITIONAL TESTS: Reviewed.

## 2023-03-13 NOTE — CHART NOTE - NSCHARTNOTEFT_GEN_A_CORE
c-diff sent for multiple loose BMs/febrile/off bowel regimen since 3/7, negative. low grade fever overnight 100.2 pan cx yesterday for fever 101.7. C. diff negative. Cefepime x 7 d for psudomonal PNA. Repeat echo done, EF 20%.

## 2023-03-13 NOTE — PROGRESS NOTE ADULT - SUBJECTIVE AND OBJECTIVE BOX
HPI:  67 y/o right handed male with pmhx of T2DM, HLD, JAGDISH, hyponatremia, tracheal stenosis s/p tracheostomy, and glioblastoma presents for cerebral angiogram with Avastin treatment.    Daughter states in 2022, patient had onset of confusion, dizziness, and speech difficulty while in [place] and was diagnosed with brain mass. Pt evaluated upon return to the [place] at [facility] ED with CT head revealing left frontal brain mass. Pt underwent resection of left frontal enhancing tumor with GLEOLAN placed 2022. Path showed gliosarcoma, WHO grade IV, IDH wild-type, EGFR non amplified, MGMT promotor methylated. Pt was discharged to rehab and underwent radiation treatment and Temodar chemotherapy (completed ). MRI  showed recurrence of brain mass and pt presents today referred by Dr. Mackenzie for trial participation cerebral angiogram with Avastin treatment.    Pt complains of dizziness. Denies headache, nausea, vomiting, weakness, numbness, chest pain, dyspnea.   (07 Mar 2023 12:30)    Hospital Course:  3/7: POD0 cerebral angiogram IA avastin. post op in cath lab hypertensive, +flash pulmonary edema w/ desaturation, given diuretics. on cpap   3/8: POD1. o/n new global aphasia, right side w/d. CTH/CTA/CTP performed. ABG o/n with metabolic acidosis, lactate 7.9 and hyperglycemic 320s. started on insulin gtt, 1L NS bolus and NS@200cc/hr. desatting on cpap, placed on full vent support. propofol for sedation. in SVT, resolved with 5 lopresor. fluids stopped due to worsening CXR. repeat lactate 12, given 1amp bicarb then started on bicarb gtt. POCUS this am with hypokinetic left ventricle, pending echo. Cards/nephro/ endo consulted for further recs. Insulin gtt dc'd. VEEG monitoring. S/p 3%Na.  SQL started tonight. S/p 10 NPH after FS of 166. steven gtt. S/p 40 IV lasix. Troponin downtrending 0.32 to 0.18. EEG negative for seizures. Pancultured spiked 101.5. UA negative. Bicarb gtt dc'd. TTE EF15-20%, akinetic apex and midsegments suggestive of takotsubo  3/9: POD2. ANA LILIA o/n neuro stable. remains sedated on propofol, on levo gtt. Propofol dc'd. s/p lasix 40 mg IV x 1. CXR L effusion improved. Started lantus 10u at bedtime per endocrine. Campuzano removed, f/u TOV.    3/10: POD3 ANA LILIA overnight. Neuro exam stable. multiple bouts of diarrhea overnight and AM. Tube feeds held. s/p albumin 250 cc +  cc for tachycardia in the setting of negative fluid status. Tolerating CPAP. Tolerating trach collar x 3 hours, switched back to CPAP overnight. Off levo gtt.   3/11: POD4 Episode of O2 desaturation to low 90s while on 40% FiO2. FiO2 increased to 60% briefly with improvement in O2, good breath sounds throughout, Pt denies SOB and CXR stable. Neuro exam stable. CXR with worsening congestion, given Lasix 40mg IV, on full vent support. Heme consulted for thrombocytopenia. Fever 101F. Episode of SVT with HR to 180s and hypotensive to 80s. Started on Steven, EKG and IV tylenol. Cards fellow called to bedside. Lopressor 5mg IV given with good resolution. Vital signs normalized. Patient remained neuro intact. Started on maintenance lopressor 12.5bid and amio gtt per cards. Given another Lasix 40mg IV at 6pm. Added regular insulin 2 units q 6 per endo.  3/12: POD5 ANA LILIA overnight. Pt remains on full vent support. Neuro exam stable.  3/13: POD 6. ANA LILIA overnight, exam stable, c-diff sent for multiple loose BMs/febrile/off bowel regimen since 3/7, low grade fever overnight 100.2 pan cx yesterday for fever 101.7    Vital Signs Last 24 Hrs  T(C): 38.4 (12 Mar 2023 21:53), Max: 38.7 (12 Mar 2023 06:14)  T(F): 101.2 (12 Mar 2023 21:53), Max: 101.7 (12 Mar 2023 06:14)  HR: 92 (13 Mar 2023 00:16) (86 - 107)  BP: 86/57 (12 Mar 2023 23:00) (86/57 - 119/75)  BP(mean): 67 (12 Mar 2023 23:00) (67 - 92)  RR: 22 (13 Mar 2023 00:16) (17 - 27)  SpO2: 100% (13 Mar 2023 00:16) (93% - 100%)    Parameters below as of 13 Mar 2023 00:16  Patient On (Oxygen Delivery Method): ventilator    O2 Concentration (%): 40    I&O's Detail    11 Mar 2023 06:01  -  12 Mar 2023 07:00  --------------------------------------------------------  IN:    Amiodarone: 433.6 mL    Enteral Tube Flush: 110 mL    Osmolite 1.2: 1385 mL  Total IN: 1928.6 mL    OUT:    Incontinent per Condom Catheter (mL): 2050 mL    Phenylephrine: 0 mL  Total OUT: 2050 mL    Total NET: -121.4 mL      12 Mar 2023 07:01  -  13 Mar 2023 00:19  --------------------------------------------------------  IN:    Amiodarone: 167 mL    Enteral Tube Flush: 50 mL    IV PiggyBack: 250 mL    Osmolite 1.2: 1105 mL  Total IN: 1572 mL    OUT:    Incontinent per Condom Catheter (mL): 1300 mL    Phenylephrine: 0 mL  Total OUT: 1300 mL    Total NET: 272 mL        I&O's Summary    11 Mar 2023 06:01  -  12 Mar 2023 07:00  --------------------------------------------------------  IN: 1928.6 mL / OUT: 2050 mL / NET: -121.4 mL    12 Mar 2023 07:01  -  13 Mar 2023 00:19  --------------------------------------------------------  IN: 1572 mL / OUT: 1300 mL / NET: 272 mL        PHYSICAL EXAM:  GEN: laying in bed, NAD  NEURO: AAOx3 (with choices nods/shakes head appropriately), FC  CNII-XII: EOM intact, PERRL, OE spont, face symmetric  Motor: MAEx4 5/5 throughout B/L UE and LE, no pronator drift  CV: RRR +S1/S2  PULM: CTAB  GI: Abd soft, NT/ND  EXT: ext warm, dry, nontender. pulses 2+ b/l  WOUND: R groin site c/d/i +steristrip in place      TUBES/LINES:  [] CVC  [] A-line  [] Lumbar Drain  [] Ventriculostomy  [] Other    DIET:  [] NPO  [] Mechanical  [] Tube feeds    LABS:                        9.9    5.51  )-----------( 79       ( 12 Mar 2023 04:28 )             28.9     03-12    130<L>  |  95<L>  |  17  ----------------------------<  235<H>  3.6   |  26  |  0.59    Ca    7.8<L>      12 Mar 2023 04:28  Phos  2.3     03-12  Mg     2.0     03-12    TPro  5.4<L>  /  Alb  3.0<L>  /  TBili  1.1  /  DBili  0.4<H>  /  AST  88<H>  /  ALT  61<H>  /  AlkPhos  70  03-12    PT/INR - ( 12 Mar 2023 04:28 )   PT: 18.1 sec;   INR: 1.51          PTT - ( 12 Mar 2023 04:28 )  PTT:28.7 sec  Urinalysis Basic - ( 12 Mar 2023 13:16 )    Color: Yellow / Appearance: Clear / S.025 / pH: x  Gluc: x / Ketone: Trace mg/dL  / Bili: Negative / Urobili: 4.0 E.U./dL   Blood: x / Protein: 30 mg/dL / Nitrite: POSITIVE   Leuk Esterase: NEGATIVE / RBC: > 10 /HPF / WBC < 5 /HPF   Sq Epi: x / Non Sq Epi: 0-5 /HPF / Bacteria: Present /HPF      CARDIAC MARKERS ( 12 Mar 2023 04:28 )  x     / 0.02 ng/mL / x     / x     / x      CARDIAC MARKERS ( 11 Mar 2023 17:21 )  x     / 0.03 ng/mL / 94 U/L / x     / <1.0 ng/mL      CAPILLARY BLOOD GLUCOSE      POCT Blood Glucose.: 223 mg/dL (12 Mar 2023 23:21)  POCT Blood Glucose.: 136 mg/dL (12 Mar 2023 16:35)  POCT Blood Glucose.: 204 mg/dL (12 Mar 2023 11:21)  POCT Blood Glucose.: 259 mg/dL (12 Mar 2023 06:24)      Drug Levels: [] N/A    CSF Analysis: [] N/A      Allergies    amoxicillin (Rash)    Intolerances      MEDICATIONS:  Antibiotics:  cefepime   IVPB 2000 milliGRAM(s) IV Intermittent every 8 hours  vancomycin  IVPB 1000 milliGRAM(s) IV Intermittent every 12 hours    Neuro:  acetaminophen     Tablet .. 650 milliGRAM(s) Oral every 6 hours PRN  levETIRAcetam 750 milliGRAM(s) Oral two times a day  ondansetron Injectable 4 milliGRAM(s) IV Push every 8 hours PRN    Anticoagulation:  enoxaparin Injectable 40 milliGRAM(s) SubCutaneous <User Schedule>    OTHER:  acetylcysteine 20%  Inhalation 4 milliLiter(s) Inhalation every 6 hours  albuterol/ipratropium for Nebulization 3 milliLiter(s) Nebulizer every 6 hours PRN  aMIOdarone    Tablet 400 milliGRAM(s) Oral two times a day  atorvastatin 20 milliGRAM(s) Oral at bedtime  chlorhexidine 0.12% Liquid 15 milliLiter(s) Oral Mucosa every 12 hours  chlorhexidine 2% Cloths 1 Application(s) Topical <User Schedule>  fluticasone propionate 50 MICROgram(s)/spray Nasal Spray 1 Spray(s) Both Nostrils two times a day  insulin glargine Injectable (LANTUS) 10 Unit(s) SubCutaneous at bedtime  insulin regular  human corrective regimen sliding scale   SubCutaneous every 6 hours  insulin regular  human recombinant 6 Unit(s) SubCutaneous every 6 hours  metoprolol tartrate 25 milliGRAM(s) Oral every 12 hours  sodium chloride 3%  Inhalation 4 milliLiter(s) Inhalation every 6 hours  sucralfate 1 Gram(s) Oral every 12 hours    IVF:  iron sucrose IVPB 300 milliGRAM(s) IV Intermittent every 24 hours    CULTURES:  Culture Results:   Numerous Pseudomonas aeruginosa Susceptibility to follow.  Moderate Staphylococcus aureus Susceptibility to follow.  Normal Respiratory Shani present  Culture in progress (03-10 @ 17:19)  Culture Results:   No growth at 4 days. ( @ 17:20)    RADIOLOGY & ADDITIONAL TESTS:      ASSESSMENT:  67 y/o right handed male with pmhx of T2DM, HLD, JAGDISH, hyponatremia, tracheal stenosis s/p tracheostomy, and glioblastoma now s/p cerebral angiogram with Avastin treatment (3/7/23). c/b cardiogenic shock, cardiomyopathy, improving.     C71.9    Abnormal electrocardiogram [ECG] [EKG]    Allergy, unspecified, initial encounter    Anemia, unspecified    Benign prostatic hyperplasia without lower urinary tract symptoms    Calculus of kidney    Cough, unspecified    Depression, unspecified    ENCOUNTER FOR ATTENT    Encounter for general adult medical examination without abnormal findings    Encounter for immunization    Encounter for immunization safety counseling    Encounter for other preprocedural examination    Encounter for screening for malignant neoplasm of prostate    Encounter for screening for other metabolic disorders    Encounter for screening for other viral diseases    ESSENTIAL (PRIMARY)    Gastro-esophageal reflux disease without esophagitis    Generalized anxiety disorder    History of Glioblastoma    Hyperlipidemia, unspecified    Hypo-osmolality and hyponatremia    Hypotension, unspecified    Iron deficiency    Iron deficiency anemia, unspecified    LONG TERM (CURRENT)    LONG TERM (CURRENT)    LONG TERM (CURRENT)    Malignant neoplasm of brain, unspecified    Nausea with vomiting, unspecified    Other constipation    Other fatigue    Other injury of unspecified body region, initial encounter    OTHER SPECIFIED DISE    Personal history of malignant neoplasm, unspecified    Personal history of other diseases of the nervous system and sense organs    Personal history of other endocrine, nutritional and metabolic disease    Personal history of other mental and behavioral disorders    Personal history of transient ischemic attack (TIA), and cerebral infarction without residual deficits    POSTPROCEDURAL SUBGL    Pruritus, unspecified    Shortness of breath    Tachycardia, unspecified    Thrombocytopenia, unspecified    Unspecified abdominal pain    Unspecified visual disturbance    Urinary calculus, unspecified    Vitamin D deficiency, unspecified    No pertinent family history in first degree relatives    FH: diabetes mellitus (Father, Mother)    FH: hyperlipidemia (Father)    FH: hypertension (Father, Mother)    Handoff    No pertinent past medical history    Diabetes mellitus    Hypertension    Glioblastoma    Type 2 diabetes mellitus    Hyperlipidemia    Iron deficiency anemia    Nephrolithiasis    Thrombocytopenia    Hyponatremia    BPH (benign prostatic hyperplasia)    No pertinent past medical history    GBM (glioblastoma multiforme)    GBM (glioblastoma multiforme)    Acute respiratory failure with hypoxia    Tracheostomy status    Acute hyponatremia    Angiogram, carotid and cerebral arteries    No significant past surgical history    No significant past surgical history    S/P craniotomy    H/O tracheostomy    No significant past surgical history    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        PLAN:  Neuro   - Vitals/neuro q4   - CTA/CTP/repeat CTH negative    - Pain control   - Cont home Keppra 750 BID  - MRI w/wo on stepdown     Cards  - Goal MAP > 65, off levophed gtt  - Cards following - GDMT   - TTE EF 15-20%, 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  - Per cards: started on lopressor 12.5 bid and amiodarone gtt on 3/11, now on amio PO 400mg BID  - repeat echo 3/13    Pulm  - full vent support with CPAP trials   - Lasix prn   - duonebs, mucomyst, saline nebs for secretions   - Pulm following    GI  - NGT - Osmolite 1.2 @ 65 d/t diarrhea   - Bowel regimen held for loose stool, + banatrol, last BM 3/10  - Cont home protonix  - RT placed 3/10, removed 3/11  - monitor LFTs while on amio     RENAL:   - Chronic hyponatremia   - voiding    HEME:  - DVT ppx: SQL, SCD L side only, 3/11 antixa 0.23  - LE dopplers + R IM calf DVT, continue serial dopplers 3/13  - heme consulted for thrombocytopenia- recommend IV iron x 3 days  - per heme, recommend full AC with hep gtt for DVT    ID:   - 3/8 pancx neg  - 3/12 pancx  - started on empiric vanco/cefepime 3/12   - c.diff sent 3/12    ENDO:   - DKA resolved    - DM: A1C 5.8, coninue ISS   - Lantus 10u at bedtime, reg insulin 6u q6h    DISPO:   - ICU status, full code, dispo pending    D/W Dr. Ho and Dr. Huggins        Assessment:  Present when checked    []  GCS  E   V  M     Heart Failure: []Acute, [] acute on chronic , []chronic  Heart Failure:  [] Diastolic (HFpEF), [] Systolic (HFrEF), []Combined (HFpEF and HFrEF), [] RHF, [] Pulm HTN, [] Other    [] EUGENIA, [] ATN, [] AIN, [] other  [] CKD1, [] CKD2, [] CKD 3, [] CKD 4, [] CKD 5, []ESRD    Encephalopathy: [] Metabolic, [] Hepatic, [] toxic, [] Neurological, [] Other    Abnormal Nurtitional Status: [] malnurtition (see nutrition note), [ ]underweight: BMI < 19, [] morbid obesity: BMI >40, [] Cachexia    [] Sepsis  [] hypovolemic shock,[] cardiogenic shock, [] hemorrhagic shock, [] neuogenic shock  [] Acute Respiratory Failure  []Cerebral edema, [] Brain compression/ herniation,   [] Functional quadriplegia  [] Acute blood loss anemia   HPI:  69 y/o right handed male with pmhx of T2DM, HLD, JAGDISH, hyponatremia, tracheal stenosis s/p tracheostomy, and glioblastoma presents for cerebral angiogram with Avastin treatment.    Daughter states in 2022, patient had onset of confusion, dizziness, and speech difficulty while in [place] and was diagnosed with brain mass. Pt evaluated upon return to the [place] at [facility] ED with CT head revealing left frontal brain mass. Pt underwent resection of left frontal enhancing tumor with GLEOLAN placed 2022. Path showed gliosarcoma, WHO grade IV, IDH wild-type, EGFR non amplified, MGMT promotor methylated. Pt was discharged to rehab and underwent radiation treatment and Temodar chemotherapy (completed ). MRI  showed recurrence of brain mass and pt presents today referred by Dr. Mackenzie for trial participation cerebral angiogram with Avastin treatment.    Pt complains of dizziness. Denies headache, nausea, vomiting, weakness, numbness, chest pain, dyspnea.   (07 Mar 2023 12:30)    Hospital Course:  3/7: POD0 cerebral angiogram IA avastin. post op in cath lab hypertensive, +flash pulmonary edema w/ desaturation, given diuretics. on cpap   3/8: POD1. o/n new global aphasia, right side w/d. CTH/CTA/CTP performed. ABG o/n with metabolic acidosis, lactate 7.9 and hyperglycemic 320s. started on insulin gtt, 1L NS bolus and NS@200cc/hr. desatting on cpap, placed on full vent support. propofol for sedation. in SVT, resolved with 5 lopresor. fluids stopped due to worsening CXR. repeat lactate 12, given 1amp bicarb then started on bicarb gtt. POCUS this am with hypokinetic left ventricle, pending echo. Cards/nephro/ endo consulted for further recs. Insulin gtt dc'd. VEEG monitoring. S/p 3%Na.  SQL started tonight. S/p 10 NPH after FS of 166. steven gtt. S/p 40 IV lasix. Troponin downtrending 0.32 to 0.18. EEG negative for seizures. Pancultured spiked 101.5. UA negative. Bicarb gtt dc'd. TTE EF15-20%, akinetic apex and midsegments suggestive of takotsubo  3/9: POD2. ANA LILIA o/n neuro stable. remains sedated on propofol, on levo gtt. Propofol dc'd. s/p lasix 40 mg IV x 1. CXR L effusion improved. Started lantus 10u at bedtime per endocrine. Campuzano removed, f/u TOV.    3/10: POD3 ANA LILIA overnight. Neuro exam stable. multiple bouts of diarrhea overnight and AM. Tube feeds held. s/p albumin 250 cc +  cc for tachycardia in the setting of negative fluid status. Tolerating CPAP. Tolerating trach collar x 3 hours, switched back to CPAP overnight. Off levo gtt.   3/11: POD4 Episode of O2 desaturation to low 90s while on 40% FiO2. FiO2 increased to 60% briefly with improvement in O2, good breath sounds throughout, Pt denies SOB and CXR stable. Neuro exam stable. CXR with worsening congestion, given Lasix 40mg IV, on full vent support. Heme consulted for thrombocytopenia. Fever 101F. Episode of SVT with HR to 180s and hypotensive to 80s. Started on Steven, EKG and IV tylenol. Cards fellow called to bedside. Lopressor 5mg IV given with good resolution. Vital signs normalized. Patient remained neuro intact. Started on maintenance lopressor 12.5bid and amio gtt per cards. Given another Lasix 40mg IV at 6pm. Added regular insulin 2 units q 6 per endo.  3/12: POD5 ANA LILIA overnight. Pt remains on full vent support. Neuro exam stable.  3/13: POD 6. ANA LILIA overnight, exam stable, c-diff sent for multiple loose BMs/febrile/off bowel regimen since 3/7, low grade fever overnight 100.2 pan cx yesterday for fever 101.7    Vital Signs Last 24 Hrs  T(C): 38.4 (12 Mar 2023 21:53), Max: 38.7 (12 Mar 2023 06:14)  T(F): 101.2 (12 Mar 2023 21:53), Max: 101.7 (12 Mar 2023 06:14)  HR: 92 (13 Mar 2023 00:16) (86 - 107)  BP: 86/57 (12 Mar 2023 23:00) (86/57 - 119/75)  BP(mean): 67 (12 Mar 2023 23:00) (67 - 92)  RR: 22 (13 Mar 2023 00:16) (17 - 27)  SpO2: 100% (13 Mar 2023 00:16) (93% - 100%)    Parameters below as of 13 Mar 2023 00:16  Patient On (Oxygen Delivery Method): ventilator    O2 Concentration (%): 40    I&O's Detail    11 Mar 2023 06:01  -  12 Mar 2023 07:00  --------------------------------------------------------  IN:    Amiodarone: 433.6 mL    Enteral Tube Flush: 110 mL    Osmolite 1.2: 1385 mL  Total IN: 1928.6 mL    OUT:    Incontinent per Condom Catheter (mL): 2050 mL    Phenylephrine: 0 mL  Total OUT: 2050 mL    Total NET: -121.4 mL      12 Mar 2023 07:01  -  13 Mar 2023 00:19  --------------------------------------------------------  IN:    Amiodarone: 167 mL    Enteral Tube Flush: 50 mL    IV PiggyBack: 250 mL    Osmolite 1.2: 1105 mL  Total IN: 1572 mL    OUT:    Incontinent per Condom Catheter (mL): 1300 mL    Phenylephrine: 0 mL  Total OUT: 1300 mL    Total NET: 272 mL        I&O's Summary    11 Mar 2023 06:01  -  12 Mar 2023 07:00  --------------------------------------------------------  IN: 1928.6 mL / OUT: 2050 mL / NET: -121.4 mL    12 Mar 2023 07:01  -  13 Mar 2023 00:19  --------------------------------------------------------  IN: 1572 mL / OUT: 1300 mL / NET: 272 mL        PHYSICAL EXAM:  GEN: laying in bed, NAD  NEURO: AAOx3 (with choices nods/shakes head appropriately), FC  CNII-XII: EOM intact, PERRL, OE spont, face symmetric  Motor: MAEx4 5/5 throughout B/L UE and LE, no pronator drift  CV: RRR +S1/S2  PULM: CTAB  GI: Abd soft, NT/ND  EXT: ext warm, dry, nontender. pulses 2+ b/l  WOUND: R groin site c/d/i +steristrip in place      TUBES/LINES:  [] Campuzano  [] Lumbar Drain  [] Wound Drains  [x] Others: R IJ    DIET:  [] NPO  [] Mechanical  [x] Tube feeds    LABS:                        9.9    5.51  )-----------( 79       ( 12 Mar 2023 04:28 )             28.9     03-12    130<L>  |  95<L>  |  17  ----------------------------<  235<H>  3.6   |  26  |  0.59    Ca    7.8<L>      12 Mar 2023 04:28  Phos  2.3     03-12  Mg     2.0     03-12    TPro  5.4<L>  /  Alb  3.0<L>  /  TBili  1.1  /  DBili  0.4<H>  /  AST  88<H>  /  ALT  61<H>  /  AlkPhos  70  03-12    PT/INR - ( 12 Mar 2023 04:28 )   PT: 18.1 sec;   INR: 1.51          PTT - ( 12 Mar 2023 04:28 )  PTT:28.7 sec  Urinalysis Basic - ( 12 Mar 2023 13:16 )    Color: Yellow / Appearance: Clear / S.025 / pH: x  Gluc: x / Ketone: Trace mg/dL  / Bili: Negative / Urobili: 4.0 E.U./dL   Blood: x / Protein: 30 mg/dL / Nitrite: POSITIVE   Leuk Esterase: NEGATIVE / RBC: > 10 /HPF / WBC < 5 /HPF   Sq Epi: x / Non Sq Epi: 0-5 /HPF / Bacteria: Present /HPF      CARDIAC MARKERS ( 12 Mar 2023 04:28 )  x     / 0.02 ng/mL / x     / x     / x      CARDIAC MARKERS ( 11 Mar 2023 17:21 )  x     / 0.03 ng/mL / 94 U/L / x     / <1.0 ng/mL      CAPILLARY BLOOD GLUCOSE      POCT Blood Glucose.: 223 mg/dL (12 Mar 2023 23:21)  POCT Blood Glucose.: 136 mg/dL (12 Mar 2023 16:35)  POCT Blood Glucose.: 204 mg/dL (12 Mar 2023 11:21)  POCT Blood Glucose.: 259 mg/dL (12 Mar 2023 06:24)      Drug Levels: [] N/A    CSF Analysis: [] N/A      Allergies    amoxicillin (Rash)    Intolerances      MEDICATIONS:  Antibiotics:  cefepime   IVPB 2000 milliGRAM(s) IV Intermittent every 8 hours  vancomycin  IVPB 1000 milliGRAM(s) IV Intermittent every 12 hours    Neuro:  acetaminophen     Tablet .. 650 milliGRAM(s) Oral every 6 hours PRN  levETIRAcetam 750 milliGRAM(s) Oral two times a day  ondansetron Injectable 4 milliGRAM(s) IV Push every 8 hours PRN    Anticoagulation:  enoxaparin Injectable 40 milliGRAM(s) SubCutaneous <User Schedule>    OTHER:  acetylcysteine 20%  Inhalation 4 milliLiter(s) Inhalation every 6 hours  albuterol/ipratropium for Nebulization 3 milliLiter(s) Nebulizer every 6 hours PRN  aMIOdarone    Tablet 400 milliGRAM(s) Oral two times a day  atorvastatin 20 milliGRAM(s) Oral at bedtime  chlorhexidine 0.12% Liquid 15 milliLiter(s) Oral Mucosa every 12 hours  chlorhexidine 2% Cloths 1 Application(s) Topical <User Schedule>  fluticasone propionate 50 MICROgram(s)/spray Nasal Spray 1 Spray(s) Both Nostrils two times a day  insulin glargine Injectable (LANTUS) 10 Unit(s) SubCutaneous at bedtime  insulin regular  human corrective regimen sliding scale   SubCutaneous every 6 hours  insulin regular  human recombinant 6 Unit(s) SubCutaneous every 6 hours  metoprolol tartrate 25 milliGRAM(s) Oral every 12 hours  sodium chloride 3%  Inhalation 4 milliLiter(s) Inhalation every 6 hours  sucralfate 1 Gram(s) Oral every 12 hours    IVF:  iron sucrose IVPB 300 milliGRAM(s) IV Intermittent every 24 hours    CULTURES:  Culture Results:   Numerous Pseudomonas aeruginosa Susceptibility to follow.  Moderate Staphylococcus aureus Susceptibility to follow.  Normal Respiratory Shani present  Culture in progress (03-10 @ 17:19)  Culture Results:   No growth at 4 days. ( @ 17:20)    RADIOLOGY & ADDITIONAL TESTS:      ASSESSMENT:  69 y/o right handed male with pmhx of T2DM, HLD, JAGDISH, hyponatremia, tracheal stenosis s/p tracheostomy, and glioblastoma now s/p cerebral angiogram with Avastin treatment (3/7/23). c/b cardiogenic shock, cardiomyopathy, improving.     C71.9    Abnormal electrocardiogram [ECG] [EKG]    Allergy, unspecified, initial encounter    Anemia, unspecified    Benign prostatic hyperplasia without lower urinary tract symptoms    Calculus of kidney    Cough, unspecified    Depression, unspecified    ENCOUNTER FOR ATTENT    Encounter for general adult medical examination without abnormal findings    Encounter for immunization    Encounter for immunization safety counseling    Encounter for other preprocedural examination    Encounter for screening for malignant neoplasm of prostate    Encounter for screening for other metabolic disorders    Encounter for screening for other viral diseases    ESSENTIAL (PRIMARY)    Gastro-esophageal reflux disease without esophagitis    Generalized anxiety disorder    History of Glioblastoma    Hyperlipidemia, unspecified    Hypo-osmolality and hyponatremia    Hypotension, unspecified    Iron deficiency    Iron deficiency anemia, unspecified    LONG TERM (CURRENT)    LONG TERM (CURRENT)    LONG TERM (CURRENT)    Malignant neoplasm of brain, unspecified    Nausea with vomiting, unspecified    Other constipation    Other fatigue    Other injury of unspecified body region, initial encounter    OTHER SPECIFIED DISE    Personal history of malignant neoplasm, unspecified    Personal history of other diseases of the nervous system and sense organs    Personal history of other endocrine, nutritional and metabolic disease    Personal history of other mental and behavioral disorders    Personal history of transient ischemic attack (TIA), and cerebral infarction without residual deficits    POSTPROCEDURAL SUBGL    Pruritus, unspecified    Shortness of breath    Tachycardia, unspecified    Thrombocytopenia, unspecified    Unspecified abdominal pain    Unspecified visual disturbance    Urinary calculus, unspecified    Vitamin D deficiency, unspecified    No pertinent family history in first degree relatives    FH: diabetes mellitus (Father, Mother)    FH: hyperlipidemia (Father)    FH: hypertension (Father, Mother)    Handoff    No pertinent past medical history    Diabetes mellitus    Hypertension    Glioblastoma    Type 2 diabetes mellitus    Hyperlipidemia    Iron deficiency anemia    Nephrolithiasis    Thrombocytopenia    Hyponatremia    BPH (benign prostatic hyperplasia)    No pertinent past medical history    GBM (glioblastoma multiforme)    GBM (glioblastoma multiforme)    Acute respiratory failure with hypoxia    Tracheostomy status    Acute hyponatremia    Angiogram, carotid and cerebral arteries    No significant past surgical history    No significant past surgical history    S/P craniotomy    H/O tracheostomy    No significant past surgical history    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        PLAN:  Neuro   - Vitals/neuro q4   - CTA/CTP/repeat CTH negative    - Pain control   - Cont home Keppra 750 BID  - MRI w/wo on stepdown     Cards  - Goal MAP > 65, off levophed gtt  - Cards following - GDMT   - TTE EF 15-20%, 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  - Per cards: started on lopressor 12.5 bid and amiodarone gtt on 3/11, now on amio PO 400mg BID  - repeat echo 3/13    Pulm  - full vent support with CPAP trials   - Lasix prn   - duonebs, mucomyst, saline nebs for secretions   - Pulm following    GI  - NGT - Osmolite 1.2 @ 65 d/t diarrhea   - Bowel regimen held for loose stool, + banatrol, last BM 3/10  - Cont home protonix  - RT placed 3/10, removed 3/11  - monitor LFTs while on amio     RENAL:   - Chronic hyponatremia   - voiding    HEME:  - DVT ppx: SQL, SCD L side only, 3/11 antixa 0.23  - LE dopplers + R IM calf DVT, continue serial dopplers 3/13  - heme consulted for thrombocytopenia- recommend IV iron x 3 days  - per heme, recommend full AC with hep gtt for DVT    ID:   - 3/8 pancx neg  - 3/12 pancx  - started on empiric vanco/cefepime 3/12   - c.diff sent 3/12    ENDO:   - DKA resolved    - DM: A1C 5.8, coninue ISS   - Lantus 10u at bedtime, reg insulin 6u q6h    DISPO:   - ICU status, full code, dispo pending    D/W Dr. Ho and Dr. Huggins        Assessment:  Present when checked    []  GCS  E   V  M     Heart Failure: []Acute, [] acute on chronic , []chronic  Heart Failure:  [] Diastolic (HFpEF), [] Systolic (HFrEF), []Combined (HFpEF and HFrEF), [] RHF, [] Pulm HTN, [] Other    [] EUGENIA, [] ATN, [] AIN, [] other  [] CKD1, [] CKD2, [] CKD 3, [] CKD 4, [] CKD 5, []ESRD    Encephalopathy: [] Metabolic, [] Hepatic, [] toxic, [] Neurological, [] Other    Abnormal Nurtitional Status: [] malnurtition (see nutrition note), [ ]underweight: BMI < 19, [] morbid obesity: BMI >40, [] Cachexia    [] Sepsis  [] hypovolemic shock,[] cardiogenic shock, [] hemorrhagic shock, [] neuogenic shock  [] Acute Respiratory Failure  []Cerebral edema, [] Brain compression/ herniation,   [] Functional quadriplegia  [] Acute blood loss anemia

## 2023-03-13 NOTE — PROGRESS NOTE ADULT - SUBJECTIVE AND OBJECTIVE BOX
INTERVAL HPI/OVERNIGHT EVENTS:    SUBJECTIVE: Patient seen and examined at bedside.    Vital Signs Last 24 Hrs  T(C): 37.2 (13 Mar 2023 09:00), Max: 38.4 (12 Mar 2023 21:53)  T(F): 99 (13 Mar 2023 09:00), Max: 101.2 (12 Mar 2023 21:53)  HR: 84 (13 Mar 2023 10:20) (80 - 103)  BP: 99/71 (13 Mar 2023 07:00) (86/57 - 112/76)  BP(mean): 80 (13 Mar 2023 07:00) (67 - 89)  RR: 22 (13 Mar 2023 10:20) (17 - 24)  SpO2: 97% (13 Mar 2023 10:20) (97% - 100%)    Parameters below as of 13 Mar 2023 10:20  Patient On (Oxygen Delivery Method): mask, aerosol    O2 Concentration (%): 40      PHYSICAL EXAM:    Constitutional: NAD  HEENT: NC/AT; PERRL, anicteric sclera; MMM  Neck: supple, no JVD, +trach  Cardiovascular: +S1/S2, tachycardic  Respiratory: CTA B/L, no W/R/R  Gastrointestinal: abdomen soft, NT/ND; no rebound or guarding; +BSx4  Genitourinary: no suprapubic tenderness or fullness  Extremities: WWP; no LE edema; no clubbing or cyanosis  Vascular: 2+ radial, DP/PT and femoral pulses B/L  Dermatologic: normal color and turgor; no visible rashes  Neurological: nonfocal      MEDICATIONS  (STANDING):  acetylcysteine 20%  Inhalation 4 milliLiter(s) Inhalation every 6 hours  aMIOdarone    Tablet 400 milliGRAM(s) Oral two times a day  atorvastatin 20 milliGRAM(s) Oral at bedtime  cefepime   IVPB 2000 milliGRAM(s) IV Intermittent every 8 hours  chlorhexidine 0.12% Liquid 15 milliLiter(s) Oral Mucosa every 12 hours  chlorhexidine 2% Cloths 1 Application(s) Topical <User Schedule>  enoxaparin Injectable 40 milliGRAM(s) SubCutaneous <User Schedule>  fluticasone propionate 50 MICROgram(s)/spray Nasal Spray 1 Spray(s) Both Nostrils two times a day  insulin glargine Injectable (LANTUS) 10 Unit(s) SubCutaneous at bedtime  insulin regular  human corrective regimen sliding scale   SubCutaneous every 6 hours  insulin regular  human recombinant 6 Unit(s) SubCutaneous every 6 hours  iron sucrose IVPB 300 milliGRAM(s) IV Intermittent every 24 hours  levETIRAcetam 750 milliGRAM(s) Oral two times a day  metoprolol tartrate 25 milliGRAM(s) Oral every 12 hours  sodium chloride 3%  Inhalation 4 milliLiter(s) Inhalation every 6 hours  sucralfate 1 Gram(s) Oral every 12 hours    MEDICATIONS  (PRN):  acetaminophen     Tablet .. 650 milliGRAM(s) Oral every 6 hours PRN Temp greater or equal to 38C (100.4F), Mild Pain (1 - 3)  albuterol/ipratropium for Nebulization 3 milliLiter(s) Nebulizer every 6 hours PRN Shortness of Breath and/or Wheezing  ondansetron Injectable 4 milliGRAM(s) IV Push every 8 hours PRN Nausea and/or Vomiting  sodium chloride 0.9% lock flush 10 milliLiter(s) IV Push every 1 hour PRN Pre/post blood products, medications, blood draw, and to maintain line patency        ALLERGIES:  Allergies    amoxicillin (Rash)    Intolerances        LABS:                          9.8    5.98  )-----------( 108      ( 13 Mar 2023 05:30 )             28.1       03-13    132<L>  |  96  |  18  ----------------------------<  184<H>  3.6   |  26  |  0.59    Ca    7.8<L>      13 Mar 2023 05:30  Phos  2.1     03-13  Mg     1.9     03-13    TPro  5.4<L>  /  Alb  2.6<L>  /  TBili  0.9  /  DBili  x   /  AST  29  /  ALT  41  /  AlkPhos  72  03-13      PT/INR - ( 12 Mar 2023 04:28 )   PT: 18.1 sec;   INR: 1.51        PTT - ( 12 Mar 2023 04:28 )  PTT:28.7 sec    RADIOLOGY & ADDITIONAL TESTS: Reviewed.

## 2023-03-13 NOTE — PROGRESS NOTE ADULT - SUBJECTIVE AND OBJECTIVE BOX
NSCU ATTENDING -- ADDITIONAL PROGRESS NOTE    Nighttime rounds were performed -- please refer to earlier Progress Note for HPI details.      ICU Vital Signs Last 24 Hrs  T(C): 37.3 (13 Mar 2023 17:25), Max: 38.4 (12 Mar 2023 21:53)  T(F): 99.2 (13 Mar 2023 17:25), Max: 101.2 (12 Mar 2023 21:53)  HR: 91 (13 Mar 2023 18:00) (80 - 103)  BP: 113/74 (13 Mar 2023 15:30) (86/57 - 113/74)  BP(mean): 87 (13 Mar 2023 15:30) (67 - 89)  ABP: 117/55 (13 Mar 2023 18:00) (94/45 - 146/69)  ABP(mean): 73 (13 Mar 2023 18:00) (64 - 89)  RR: 18 (13 Mar 2023 18:00) (17 - 24)  SpO2: 95% (13 Mar 2023 18:00) (92% - 100%)      03-12-23 @ 07:01  -  03-13-23 @ 07:00  --------------------------------------------------------  IN: 2447 mL / OUT: 1300 mL / NET: 1147 mL    03-13-23 @ 07:01  -  03-13-23 @ 19:11  --------------------------------------------------------  IN: 1065 mL / OUT: 350 mL / NET: 715 mL      Mode: standby    MEDICATIONS:   acetaminophen     Tablet .. 650 milliGRAM(s) Oral every 6 hours PRN  acetylcysteine 20%  Inhalation 4 milliLiter(s) Inhalation every 6 hours  albuterol/ipratropium for Nebulization 3 milliLiter(s) Nebulizer every 6 hours PRN  aMIOdarone    Tablet 400 milliGRAM(s) Oral two times a day  atorvastatin 20 milliGRAM(s) Oral at bedtime  cefepime   IVPB 2000 milliGRAM(s) IV Intermittent every 8 hours  chlorhexidine 0.12% Liquid 15 milliLiter(s) Oral Mucosa every 12 hours  chlorhexidine 2% Cloths 1 Application(s) Topical <User Schedule>  enoxaparin Injectable 40 milliGRAM(s) SubCutaneous <User Schedule>  fluticasone propionate 50 MICROgram(s)/spray Nasal Spray 1 Spray(s) Both Nostrils two times a day  insulin glargine Injectable (LANTUS) 10 Unit(s) SubCutaneous at bedtime  insulin regular  human corrective regimen sliding scale   SubCutaneous every 6 hours  insulin regular  human recombinant 5 Unit(s) SubCutaneous every 6 hours  iron sucrose IVPB 300 milliGRAM(s) IV Intermittent every 24 hours  levETIRAcetam 750 milliGRAM(s) Oral two times a day  metoprolol tartrate 25 milliGRAM(s) Oral every 12 hours  ondansetron Injectable 4 milliGRAM(s) IV Push every 8 hours PRN  sodium chloride 0.9% lock flush 10 milliLiter(s) IV Push every 1 hour PRN  sodium chloride 3%  Inhalation 4 milliLiter(s) Inhalation every 6 hours  sucralfate 1 Gram(s) Oral every 12 hours    LABS:                     9.8    5.98  )-----------( 108      ( 13 Mar 2023 05:30 )             28.1     03-13    132<L>  |  96  |  18  ----------------------------<  184<H>  3.6   |  26  |  0.59    Ca    7.8<L>      13 Mar 2023 05:30  Phos  2.1     03-13  Mg     1.9     03-13    TPro  5.4<L>  /  Alb  2.6<L>  /  TBili  0.9  /  DBili  x   /  AST  29  /  ALT  41  /  AlkPhos  72  03-13    LIVER FUNCTIONS - ( 13 Mar 2023 05:30 )  Alb: 2.6 g/dL / Pro: 5.4 g/dL / ALK PHOS: 72 U/L / ALT: 41 U/L / AST: 29 U/L / GGT: x           ABG - ( 13 Mar 2023 05:50 )  pH, Arterial: 7.52  pH, Blood: x     /  pCO2: 35    /  pO2: 163   / HCO3: 29    / Base Excess: 5.7   /  SaO2: 98.5        Culture - Sputum (collected 10 Mar 2023 17:19)  Source: ET Tube ET Tube  Gram Stain (11 Mar 2023 21:21):    Rare epithelial cells    Few White blood cells    Few Gram positive cocci in pairs and clusters    Rare Gram Positive Rods    Moderate Gram Negative Rods  Preliminary Report (12 Mar 2023 09:58):    Numerous Pseudomonas aeruginosa Susceptibility to follow.    Moderate Staphylococcus aureus Susceptibility to follow.    Normal Respiratory Shani present    Culture in progress      Assessment: GBM, s/p IA avastin; complicated by cardiogenic shock, from stress induced cardiomyopathy, pulm edema.     PULM:   Neuro: Neurochekcs Q4 hr   Keppra 750 mg Q12 hr   CV: MAP >65. Continue metoprolol 25 mg bid and amiodarone per cardiology, currently in sinus rhythm  TTE  EF 20%. Diuresce prn.  PULM: Trached- on trach collar. Continue to wean.   GI: On TFs, at goal   Renal: Monitor urine output. Monitor BMPs  ID: VAP- pseudomonas and staph; on cefepime. Vanc DCed.   Endo: DM, target sugar 140-180   Heme: R IM calf DVT. On lovenox 40 mg sc qhs. Consider full dose lovenox if no contraindications.   Thrombocytopenia- improving, heme on consult; possibly related to avastin.     35 critical care time as patient is at risk for brain herniation, seizures, ICH  NSCU ATTENDING -- ADDITIONAL PROGRESS NOTE    Nighttime rounds were performed -- please refer to earlier Progress Note for HPI details.      ICU Vital Signs Last 24 Hrs  T(C): 37.3 (13 Mar 2023 17:25), Max: 38.4 (12 Mar 2023 21:53)  T(F): 99.2 (13 Mar 2023 17:25), Max: 101.2 (12 Mar 2023 21:53)  HR: 91 (13 Mar 2023 18:00) (80 - 103)  BP: 113/74 (13 Mar 2023 15:30) (86/57 - 113/74)  BP(mean): 87 (13 Mar 2023 15:30) (67 - 89)  ABP: 117/55 (13 Mar 2023 18:00) (94/45 - 146/69)  ABP(mean): 73 (13 Mar 2023 18:00) (64 - 89)  RR: 18 (13 Mar 2023 18:00) (17 - 24)  SpO2: 95% (13 Mar 2023 18:00) (92% - 100%)      03-12-23 @ 07:01  -  03-13-23 @ 07:00  --------------------------------------------------------  IN: 2447 mL / OUT: 1300 mL / NET: 1147 mL    03-13-23 @ 07:01  -  03-13-23 @ 19:11  --------------------------------------------------------  IN: 1065 mL / OUT: 350 mL / NET: 715 mL      Mode: Standby    MEDICATIONS:   acetaminophen     Tablet .. 650 milliGRAM(s) Oral every 6 hours PRN  acetylcysteine 20%  Inhalation 4 milliLiter(s) Inhalation every 6 hours  albuterol/ipratropium for Nebulization 3 milliLiter(s) Nebulizer every 6 hours PRN  aMIOdarone    Tablet 400 milliGRAM(s) Oral two times a day  atorvastatin 20 milliGRAM(s) Oral at bedtime  cefepime   IVPB 2000 milliGRAM(s) IV Intermittent every 8 hours  chlorhexidine 0.12% Liquid 15 milliLiter(s) Oral Mucosa every 12 hours  chlorhexidine 2% Cloths 1 Application(s) Topical <User Schedule>  enoxaparin Injectable 40 milliGRAM(s) SubCutaneous <User Schedule>  fluticasone propionate 50 MICROgram(s)/spray Nasal Spray 1 Spray(s) Both Nostrils two times a day  insulin glargine Injectable (LANTUS) 10 Unit(s) SubCutaneous at bedtime  insulin regular  human corrective regimen sliding scale   SubCutaneous every 6 hours  insulin regular  human recombinant 5 Unit(s) SubCutaneous every 6 hours  iron sucrose IVPB 300 milliGRAM(s) IV Intermittent every 24 hours  levETIRAcetam 750 milliGRAM(s) Oral two times a day  metoprolol tartrate 25 milliGRAM(s) Oral every 12 hours  ondansetron Injectable 4 milliGRAM(s) IV Push every 8 hours PRN  sodium chloride 0.9% lock flush 10 milliLiter(s) IV Push every 1 hour PRN  sodium chloride 3%  Inhalation 4 milliLiter(s) Inhalation every 6 hours  sucralfate 1 Gram(s) Oral every 12 hours    LABS:                     9.8    5.98  )-----------( 108      ( 13 Mar 2023 05:30 )             28.1     03-13    132<L>  |  96  |  18  ----------------------------<  184<H>  3.6   |  26  |  0.59    Ca    7.8<L>      13 Mar 2023 05:30  Phos  2.1     03-13  Mg     1.9     03-13    TPro  5.4<L>  /  Alb  2.6<L>  /  TBili  0.9  /  DBili  x   /  AST  29  /  ALT  41  /  AlkPhos  72  03-13    LIVER FUNCTIONS - ( 13 Mar 2023 05:30 )  Alb: 2.6 g/dL / Pro: 5.4 g/dL / ALK PHOS: 72 U/L / ALT: 41 U/L / AST: 29 U/L / GGT: x           ABG - ( 13 Mar 2023 05:50 )  pH, Arterial: 7.52  pH, Blood: x     /  pCO2: 35    /  pO2: 163   / HCO3: 29    / Base Excess: 5.7   /  SaO2: 98.5        Culture - Sputum (collected 10 Mar 2023 17:19)  Source: ET Tube ET Tube  Gram Stain (11 Mar 2023 21:21):    Rare epithelial cells    Few White blood cells    Few Gram positive cocci in pairs and clusters    Rare Gram Positive Rods    Moderate Gram Negative Rods  Preliminary Report (12 Mar 2023 09:58):    Numerous Pseudomonas aeruginosa Susceptibility to follow.    Moderate Staphylococcus aureus Susceptibility to follow.    Normal Respiratory Shani present    Culture in progress      Assessment: GBM, s/p IA avastin; complicated by cardiogenic shock, from stress induced cardiomyopathy, pulm edema.     PULM:   Neuro: Neurochekcs Q4 hr   Keppra 750 mg Q12 hr   CV: MAP >65. Continue metoprolol 25 mg bid and amiodarone per cardiology, currently in sinus rhythm.  TTE EF 20%. Diuresce prn.  PULM: Trached- on trach collar. Continue to wean. RTC nebs.   GI: On TFs, at goal. LBM- 3/13.   Renal: Monitor urine output. Monitor BMPs.  ID: VAP- pseudomonas and staph; on cefepime. Vanc DCed.   Endo: DM, target sugar 140-180. Lantus 10u, regular 5 u  Heme: R IM calf DVT. On lovenox 40 mg sc qhs. Consider full dose lovenox if no contraindications.   Thrombocytopenia- improving, heme on consult; possibly related to avastin.     35 critical care time as patient is at risk for brain herniation, seizures, ICH

## 2023-03-13 NOTE — PROGRESS NOTE ADULT - SUBJECTIVE AND OBJECTIVE BOX
***********************************************  ADULT NSICU PROGRESS NOTE  JORDAN CHAKRABORTY [id number] [facility] 08EA [id number]  ***********************************************    24H INTERVAL EVENTS:    ROS: negative except per mentioned above in 24h interval events.      HOSPITAL COURSE CARRIED FORWARD:    VITALS:    ICU Vital Signs Last 24 Hrs  T(C): 37.4 (13 Mar 2023 05:11), Max: 38.4 (12 Mar 2023 21:53)  T(F): 99.3 (13 Mar 2023 05:11), Max: 101.2 (12 Mar 2023 21:53)  HR: 83 (13 Mar 2023 07:00) (82 - 104)  BP: 99/71 (13 Mar 2023 07:00) (86/57 - 112/76)  BP(mean): 80 (13 Mar 2023 07:00) (67 - 89)  ABP: 115/61 (13 Mar 2023 07:00) (96/51 - 127/64)  ABP(mean): 79 (13 Mar 2023 07:00) (67 - 85)  RR: 20 (13 Mar 2023 07:00) (17 - 24)  SpO2: 100% (13 Mar 2023 07:00) (93% - 100%)    O2 Parameters below as of 13 Mar 2023 07:00  Patient On (Oxygen Delivery Method): ventilator    O2 Concentration (%): 40        Mode: CPAP with PS  FiO2: 40  PEEP: 7  PS: 5  MAP: 12  PIP: 20      I&O's Summary    12 Mar 2023 07:01  -  13 Mar 2023 07:00  --------------------------------------------------------  IN: 2447 mL / OUT: 1300 mL / NET: 1147 mL    13 Mar 2023 07:01  -  13 Mar 2023 07:23  --------------------------------------------------------  IN: 65 mL / OUT: 0 mL / NET: 65 mL        EXAM:     [place] Coma Scale:     General: normocephalic, atraumatic, laying in bed, in no distress  Neuro     MS: A/Ox3, cooperative, normal attention, no neglect, comprehension intact, speech with preserved fluency, naming, and repetition    CN: PERRL, VF FTC, EOMI and no ptosis bilaterally, sensation intact to crude touch V1-V3, face symmetric, hearing grossly intact    Mot: bulk normal, tone normal, power 5/5 in bilateral upper and lower proximal extremities    Sens: intact to crude touch in bilateral upper and lower extremities    Reflexes: deferred    Coord: no dysmetria or ataxia on finger to nose/heel to shin, respectively, no focal bradykinetic movements    Gait: deferred  Chest: nonlabored respirations, no adventitious lung sounds bilaterally, heart regular rate/rhythm, present S1/S2, no murmurs or rubs  Abdomen: nondistended, soft and nontender without peritoneal signs, normoactive bowel sounds  Extremities: no clubbing, well-perfused, no edema    LABORATORY DATA:    ABG - ( 13 Mar 2023 05:50 )  pH, Arterial: 7.52  pH, Blood: x     /  pCO2: 35    /  pO2: 163   / HCO3: 29    / Base Excess: 5.7   /  SaO2: 98.5                                    9.8    5.98  )-----------( 108      ( 13 Mar 2023 05:30 )             28.1     03-13    132<L>  |  96  |  18  ----------------------------<  184<H>  3.6   |  26  |  0.59    Ca    7.8<L>      13 Mar 2023 05:30  Phos  2.1     03-13  Mg     1.9     03-13    TPro  5.4<L>  /  Alb  2.6<L>  /  TBili  0.9  /  DBili  x   /  AST  29  /  ALT  41  /  AlkPhos  72  03-13    LIVER FUNCTIONS - ( 13 Mar 2023 05:30 )  Alb: 2.6 g/dL / Pro: 5.4 g/dL / ALK PHOS: 72 U/L / ALT: 41 U/L / AST: 29 U/L / GGT: x           PT/INR - ( 12 Mar 2023 04:28 )   PT: 18.1 sec;   INR: 1.51          PTT - ( 12 Mar 2023 04:28 )  PTT:28.7 sec    IMAGING DATA:    CARDIOLOGY DATA:    MICROBIOLOGY DATA:      Urinalysis with Rflx Culture (collected 12 Mar 2023 13:16)    Culture - Blood (collected 12 Mar 2023 12:22)  Source: .Blood Blood  Preliminary Report (13 Mar 2023 02:00):    No growth at 12 hours    Culture - Blood (collected 12 Mar 2023 12:22)  Source: .Blood Blood  Preliminary Report (13 Mar 2023 01:00):    No growth at 12 hours    Culture - Sputum (collected 10 Mar 2023 17:19)  Source: ET Tube ET Tube  Gram Stain (11 Mar 2023 21:21):    Rare epithelial cells    Few White blood cells    Few Gram positive cocci in pairs and clusters    Rare Gram Positive Rods    Moderate Gram Negative Rods  Preliminary Report (12 Mar 2023 09:58):    Numerous Pseudomonas aeruginosa Susceptibility to follow.    Moderate Staphylococcus aureus Susceptibility to follow.    Normal Respiratory Shani present    Culture in progress        MEDICATIONS  (STANDING):  acetylcysteine 20%  Inhalation 4 milliLiter(s) Inhalation every 6 hours  aMIOdarone    Tablet 400 milliGRAM(s) Oral two times a day  atorvastatin 20 milliGRAM(s) Oral at bedtime  cefepime   IVPB 2000 milliGRAM(s) IV Intermittent every 8 hours  chlorhexidine 0.12% Liquid 15 milliLiter(s) Oral Mucosa every 12 hours  chlorhexidine 2% Cloths 1 Application(s) Topical <User Schedule>  enoxaparin Injectable 40 milliGRAM(s) SubCutaneous <User Schedule>  fluticasone propionate 50 MICROgram(s)/spray Nasal Spray 1 Spray(s) Both Nostrils two times a day  insulin glargine Injectable (LANTUS) 10 Unit(s) SubCutaneous at bedtime  insulin regular  human corrective regimen sliding scale   SubCutaneous every 6 hours  insulin regular  human recombinant 6 Unit(s) SubCutaneous every 6 hours  iron sucrose IVPB 300 milliGRAM(s) IV Intermittent every 24 hours  levETIRAcetam 750 milliGRAM(s) Oral two times a day  metoprolol tartrate 25 milliGRAM(s) Oral every 12 hours  sodium chloride 3%  Inhalation 4 milliLiter(s) Inhalation every 6 hours  sodium phosphate 30 milliMole(s)/500 mL IVPB 30 milliMole(s) IV Intermittent once  sucralfate 1 Gram(s) Oral every 12 hours  vancomycin  IVPB 1000 milliGRAM(s) IV Intermittent every 12 hours    MEDICATIONS  (PRN):  acetaminophen     Tablet .. 650 milliGRAM(s) Oral every 6 hours PRN Temp greater or equal to 38C (100.4F), Mild Pain (1 - 3)  albuterol/ipratropium for Nebulization 3 milliLiter(s) Nebulizer every 6 hours PRN Shortness of Breath and/or Wheezing  ondansetron Injectable 4 milliGRAM(s) IV Push every 8 hours PRN Nausea and/or Vomiting  sodium chloride 0.9% lock flush 10 milliLiter(s) IV Push every 1 hour PRN Pre/post blood products, medications, blood draw, and to maintain line patency      ***********************************************  ASSESSMENT AND PLAN  ***********************************************    This is a case of ***    NEURO  - Admit NSICU, Q1h neuro checks / Q1h vital signs    PULM  - SpO2 goal > 92%, supplemental O2 and pulm toileting as needed    CARDIO  - BP goal:     GI  - Diet:   - Stress ulcer prophylaxis:     /RENAL  - Monitor UOP/volume status, BUN/SCr    HEME  - Maintain Hb > 7.0, PLT > ***    ID  - Monitor for infectious s/s, fever curve, leukocytosis    ENDO    03-13-23 @ 07:23       ***********************************************  ADULT NSICU PROGRESS NOTE  JORDAN CHAKRABORTY [id number] [facility] 08EA 802 [id number]  ***********************************************    24H INTERVAL EVENTS: reported decreased UOP by nursing this afternoon.    ROS: negative except per mentioned above in 24h interval events.      VITALS:    ICU Vital Signs Last 24 Hrs  T(C): 37.4 (13 Mar 2023 05:11), Max: 38.4 (12 Mar 2023 21:53)  T(F): 99.3 (13 Mar 2023 05:11), Max: 101.2 (12 Mar 2023 21:53)  HR: 83 (13 Mar 2023 07:00) (82 - 104)  BP: 99/71 (13 Mar 2023 07:00) (86/57 - 112/76)  BP(mean): 80 (13 Mar 2023 07:00) (67 - 89)  ABP: 115/61 (13 Mar 2023 07:00) (96/51 - 127/64)  ABP(mean): 79 (13 Mar 2023 07:00) (67 - 85)  RR: 20 (13 Mar 2023 07:00) (17 - 24)  SpO2: 100% (13 Mar 2023 07:00) (93% - 100%)    O2 Parameters below as of 13 Mar 2023 07:00  Patient On (Oxygen Delivery Method): ventilator    O2 Concentration (%): 40        Mode: CPAP with PS  FiO2: 40  PEEP: 7  PS: 5  MAP: 12  PIP: 20      I&O's Summary    12 Mar 2023 07:01  -  13 Mar 2023 07:00  --------------------------------------------------------  IN: 2447 mL / OUT: 1300 mL / NET: 1147 mL    13 Mar 2023 07:01  -  13 Mar 2023 07:23  --------------------------------------------------------  IN: 65 mL / OUT: 0 mL / NET: 65 mL        EXAM:     [place] Coma Scale: 15    General: normocephalic, laying in bed, trach to vent, in no distress  Neuro     MS: A/Ox3 (to 'hospital' and appropriate month/year), cooperative with examination, no neglect, comprehension intact,    CN: PERRL, EOMI and no ptosis bilaterally, face symmetric, hearing grossly intact    Mot: bulk normal, tone normal, power 5/5 in bilateral upper and 5/5 in bilateral lower ext    Sens: intact to noxious stimuli in bilateral upper and lower ext  HEENT: tracheostomy to ventilator  Chest: nonlabored respirations, no adventitious lung sounds bilaterally, heart regular rate/rhythm, present S1/S2, no murmurs or rubs  Abdomen: nondistended, soft and nontender without peritoneal signs, normoactive bowel sounds  Extremities: no clubbing, well-perfused, no edema    LABORATORY DATA:    ABG - ( 13 Mar 2023 05:50 )  pH, Arterial: 7.52  pH, Blood: x     /  pCO2: 35    /  pO2: 163   / HCO3: 29    / Base Excess: 5.7   /  SaO2: 98.5                                    9.8    5.98  )-----------( 108      ( 13 Mar 2023 05:30 )             28.1     03-13    132<L>  |  96  |  18  ----------------------------<  184<H>  3.6   |  26  |  0.59    Ca    7.8<L>      13 Mar 2023 05:30  Phos  2.1     03-13  Mg     1.9     03-13    TPro  5.4<L>  /  Alb  2.6<L>  /  TBili  0.9  /  DBili  x   /  AST  29  /  ALT  41  /  AlkPhos  72  03-13    LIVER FUNCTIONS - ( 13 Mar 2023 05:30 )  Alb: 2.6 g/dL / Pro: 5.4 g/dL / ALK PHOS: 72 U/L / ALT: 41 U/L / AST: 29 U/L / GGT: x           PT/INR - ( 12 Mar 2023 04:28 )   PT: 18.1 sec;   INR: 1.51          PTT - ( 12 Mar 2023 04:28 )  PTT:28.7 sec    IMAGING DATA:    CARDIOLOGY DATA:    MICROBIOLOGY DATA:      Urinalysis with Rflx Culture (collected 12 Mar 2023 13:16)    Culture - Blood (collected 12 Mar 2023 12:22)  Source: .Blood Blood  Preliminary Report (13 Mar 2023 02:00):    No growth at 12 hours    Culture - Blood (collected 12 Mar 2023 12:22)  Source: .Blood Blood  Preliminary Report (13 Mar 2023 01:00):    No growth at 12 hours    Culture - Sputum (collected 10 Mar 2023 17:19)  Source: ET Tube ET Tube  Gram Stain (11 Mar 2023 21:21):    Rare epithelial cells    Few White blood cells    Few Gram positive cocci in pairs and clusters    Rare Gram Positive Rods    Moderate Gram Negative Rods  Preliminary Report (12 Mar 2023 09:58):    Numerous Pseudomonas aeruginosa Susceptibility to follow.    Moderate Staphylococcus aureus Susceptibility to follow.    Normal Respiratory Shani present    Culture in progress        MEDICATIONS  (STANDING):  acetylcysteine 20%  Inhalation 4 milliLiter(s) Inhalation every 6 hours  aMIOdarone    Tablet 400 milliGRAM(s) Oral two times a day  atorvastatin 20 milliGRAM(s) Oral at bedtime  cefepime   IVPB 2000 milliGRAM(s) IV Intermittent every 8 hours  chlorhexidine 0.12% Liquid 15 milliLiter(s) Oral Mucosa every 12 hours  chlorhexidine 2% Cloths 1 Application(s) Topical <User Schedule>  enoxaparin Injectable 40 milliGRAM(s) SubCutaneous <User Schedule>  fluticasone propionate 50 MICROgram(s)/spray Nasal Spray 1 Spray(s) Both Nostrils two times a day  insulin glargine Injectable (LANTUS) 10 Unit(s) SubCutaneous at bedtime  insulin regular  human corrective regimen sliding scale   SubCutaneous every 6 hours  insulin regular  human recombinant 6 Unit(s) SubCutaneous every 6 hours  iron sucrose IVPB 300 milliGRAM(s) IV Intermittent every 24 hours  levETIRAcetam 750 milliGRAM(s) Oral two times a day  metoprolol tartrate 25 milliGRAM(s) Oral every 12 hours  sodium chloride 3%  Inhalation 4 milliLiter(s) Inhalation every 6 hours  sodium phosphate 30 milliMole(s)/500 mL IVPB 30 milliMole(s) IV Intermittent once  sucralfate 1 Gram(s) Oral every 12 hours  vancomycin  IVPB 1000 milliGRAM(s) IV Intermittent every 12 hours    MEDICATIONS  (PRN):  acetaminophen     Tablet .. 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    ROS: negative except per mentioned above in 24h interval events.      VITALS:    ICU Vital Signs Last 24 Hrs  T(C): 37.4 (13 Mar 2023 05:11), Max: 38.4 (12 Mar 2023 21:53)  T(F): 99.3 (13 Mar 2023 05:11), Max: 101.2 (12 Mar 2023 21:53)  HR: 83 (13 Mar 2023 07:00) (82 - 104)  BP: 99/71 (13 Mar 2023 07:00) (86/57 - 112/76)  BP(mean): 80 (13 Mar 2023 07:00) (67 - 89)  ABP: 115/61 (13 Mar 2023 07:00) (96/51 - 127/64)  ABP(mean): 79 (13 Mar 2023 07:00) (67 - 85)  RR: 20 (13 Mar 2023 07:00) (17 - 24)  SpO2: 100% (13 Mar 2023 07:00) (93% - 100%)    O2 Parameters below as of 13 Mar 2023 07:00  Patient On (Oxygen Delivery Method): ventilator    O2 Concentration (%): 40        Mode: CPAP with PS  FiO2: 40  PEEP: 7  PS: 5  MAP: 12  PIP: 20      I&O's Summary    12 Mar 2023 07:01  -  13 Mar 2023 07:00  --------------------------------------------------------  IN: 2447 mL / OUT: 1300 mL / NET: 1147 mL    13 Mar 2023 07:01  -  13 Mar 2023 07:23  --------------------------------------------------------  IN: 65 mL / OUT: 0 mL / NET: 65 mL        EXAM:     [place] Coma Scale: 15    General: normocephalic, laying in bed, trach to vent, in no distress  Neuro     MS: A/Ox3 (to 'hospital' and appropriate month/year), cooperative with examination, no neglect, comprehension intact,    CN: PERRL, EOMI and no ptosis bilaterally, face symmetric, hearing grossly intact    Mot: bulk normal, tone normal, power 5/5 in bilateral upper and 5/5 in bilateral lower ext    Sens: intact to noxious stimuli in bilateral upper and lower ext  HEENT: tracheostomy to ventilator  Chest: nonlabored respirations, no adventitious lung sounds bilaterally, heart regular rate/rhythm, present S1/S2, no murmurs or rubs  Abdomen: nondistended, soft and nontender without peritoneal signs, normoactive bowel sounds  Extremities: no clubbing, well-perfused, no edema    LABORATORY DATA:    ABG - ( 13 Mar 2023 05:50 )  pH, Arterial: 7.52  pH, Blood: x     /  pCO2: 35    /  pO2: 163   / HCO3: 29    / Base Excess: 5.7   /  SaO2: 98.5                                    9.8    5.98  )-----------( 108      ( 13 Mar 2023 05:30 )             28.1     03-13    132<L>  |  96  |  18  ----------------------------<  184<H>  3.6   |  26  |  0.59    Ca    7.8<L>      13 Mar 2023 05:30  Phos  2.1     03-13  Mg     1.9     03-13    TPro  5.4<L>  /  Alb  2.6<L>  /  TBili  0.9  /  DBili  x   /  AST  29  /  ALT  41  /  AlkPhos  72  03-13    LIVER FUNCTIONS - ( 13 Mar 2023 05:30 )  Alb: 2.6 g/dL / Pro: 5.4 g/dL / ALK PHOS: 72 U/L / ALT: 41 U/L / AST: 29 U/L / GGT: x           PT/INR - ( 12 Mar 2023 04:28 )   PT: 18.1 sec;   INR: 1.51          PTT - ( 12 Mar 2023 04:28 )  PTT:28.7 sec    IMAGING DATA:    CARDIOLOGY DATA:    MICROBIOLOGY DATA:      Urinalysis with Rflx Culture (collected 12 Mar 2023 13:16)    Culture - Blood (collected 12 Mar 2023 12:22)  Source: .Blood Blood  Preliminary Report (13 Mar 2023 02:00):    No growth at 12 hours    Culture - Blood (collected 12 Mar 2023 12:22)  Source: .Blood Blood  Preliminary Report (13 Mar 2023 01:00):    No growth at 12 hours    Culture - Sputum (collected 10 Mar 2023 17:19)  Source: ET Tube ET Tube  Gram Stain (11 Mar 2023 21:21):    Rare epithelial cells    Few White blood cells    Few Gram positive cocci in pairs and clusters    Rare Gram Positive Rods    Moderate Gram Negative Rods  Preliminary Report (12 Mar 2023 09:58):    Numerous Pseudomonas aeruginosa Susceptibility to follow.    Moderate Staphylococcus aureus Susceptibility to follow.    Normal Respiratory Shani present    Culture in progress        MEDICATIONS  (STANDING):  acetylcysteine 20%  Inhalation 4 milliLiter(s) Inhalation every 6 hours  aMIOdarone    Tablet 400 milliGRAM(s) Oral two times a day  atorvastatin 20 milliGRAM(s) Oral at bedtime  cefepime   IVPB 2000 milliGRAM(s) IV Intermittent every 8 hours  chlorhexidine 0.12% Liquid 15 milliLiter(s) Oral Mucosa every 12 hours  chlorhexidine 2% Cloths 1 Application(s) Topical <User Schedule>  enoxaparin Injectable 40 milliGRAM(s) SubCutaneous <User Schedule>  fluticasone propionate 50 MICROgram(s)/spray Nasal Spray 1 Spray(s) Both Nostrils two times a day  insulin glargine Injectable (LANTUS) 10 Unit(s) SubCutaneous at bedtime  insulin regular  human corrective regimen sliding scale   SubCutaneous every 6 hours  insulin regular  human recombinant 6 Unit(s) SubCutaneous every 6 hours  iron sucrose IVPB 300 milliGRAM(s) IV Intermittent every 24 hours  levETIRAcetam 750 milliGRAM(s) Oral two times a day  metoprolol tartrate 25 milliGRAM(s) Oral every 12 hours  sodium chloride 3%  Inhalation 4 milliLiter(s) Inhalation every 6 hours  sodium phosphate 30 milliMole(s)/500 mL IVPB 30 milliMole(s) IV Intermittent once  sucralfate 1 Gram(s) Oral every 12 hours  vancomycin  IVPB 1000 milliGRAM(s) IV Intermittent every 12 hours    MEDICATIONS  (PRN):  acetaminophen     Tablet .. 650 milliGRAM(s) Oral every 6 hours PRN Temp greater or equal to 38C (100.4F), Mild Pain (1 - 3)  albuterol/ipratropium for Nebulization 3 milliLiter(s) Nebulizer every 6 hours PRN Shortness of Breath and/or Wheezing  ondansetron Injectable 4 milliGRAM(s) IV Push every 8 hours PRN Nausea and/or Vomiting  sodium chloride 0.9% lock flush 10 milliLiter(s) IV Push every 1 hour PRN Pre/post blood products, medications, blood draw, and to maintain line patency      ***********************************************  ASSESSMENT AND PLAN  ***********************************************    #S/p cerebral angio for IA avastin  #Acute HF 2/2 stress cardiomyopathy  #Acute T1 respiratory failure requiring mechanical ventilation  #Supraventricular tachycardia  #Diarrhea  #Metabolic acidosis, DKA - resolved  #Chronic hyponatremia  #Thrombocytopenia likely secondary to avastin  #R. 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 sided weakness post operatively  PULM - trial of trach collar, SpO2 goal > 92%, supplemental O2 and pulm toileting as needed  CARDIO - BP goal NORMOTENSION, MAP > 65, EF = 20%, lopressor, amiodaron 400/400, lasix prn  GI - bowel regimen, stool count, diet: TFs, PPI (home med)  /RENAL - monitor UOP/volume status, BUN/SCr  HEME - maintain Hb > 7.0, PLT > 10,000/20,000 if febrile/50,000 if bleeding, 100,000 if life threatening bleeding  ID - monitor for infectious s/s, fever curve, leukocytosis; CPM 2g q8 for VAP (MSSA/Pseudomonas)  ENDO - SGlu goal < 200, lantus/regular insulin

## 2023-03-13 NOTE — PROGRESS NOTE ADULT - ATTENDING COMMENTS
Initial attending contact date  3/8/23    . See fellow note written above for details. I reviewed the fellow documentation. I have personally seen and examined this patient. I reviewed vitals, labs, medications, cardiac studies, and additional imaging. I agree with the above fellow's findings and plans as written above with the following additions/statements.    69 y/o right handed M with PMH of T2DM, HLD, JAGDISH, hyponatremia, tracheal stenosis s/p tracheostomy, and glioblastoma s/p resection 1/2022 s/p radiation and chemo, admitted for cerebral angio with avastin tx due to recurrence of brain mass. Post op with HTN emergency leading to pulm edema  -Cardiology consulted for low EF noted on POCUS  -ECHO reviewed: severely depressed LVEF ~ 15-20% with akinetic/apical wall and basal inferior wall hyperkinesis. Mod TR with mild pulm HTN  -EKG post op NSR with new septal Qs and 1mm DAVID  -trop .32 now downtrending, BNP 6000  -Clinical picture along with EKG changes, mild trop elevation and ECHO findings all consistent with takotsubo CM  -Since takotsubo CM is diagnosis of exclusion , will need to r/o CAD if EF does not improve once clinical status improves  -Plan to repeat echo later this well   -Clinically with sig improvement, alert awake following commands  -Events 3/12 noted with AVNRT in setting of febrile state,  cont metoprolol 25 q 12 and amio 400 mg po bid for now. Will plan to dc amio if no recurrent events on tele  - K > 4.2, Mag > 2

## 2023-03-13 NOTE — CHART NOTE - NSCHARTNOTEFT_GEN_A_CORE
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      PO Intake: Good (%) [   ]  Fair (50-75%) [   ] Poor (<25%) [   ]- N/A    GI Issues:   WDL, last BM 3/12  No n/v/c reported. +multiple bouts of loose stools  No abd distention  NGT in place for EN feeds    Pain:  No pain noted    Skin Integrity:  Surgical incision, diana score 13  No edema noted  No pressure ulcers noted    Labs:   03-13    132<L>  |  96  |  18  ----------------------------<  184<H>  3.6   |  26  |  0.59    Ca    7.8<L>      13 Mar 2023 05:30  Phos  2.1     03-13  Mg     1.9     03-13    TPro  5.4<L>  /  Alb  2.6<L>  /  TBili  0.9  /  DBili  x   /  AST  29  /  ALT  41  /  AlkPhos  72  03-13    CAPILLARY BLOOD GLUCOSE    POCT Blood Glucose.: 133 mg/dL (13 Mar 2023 06:32)  POCT Blood Glucose.: 223 mg/dL (12 Mar 2023 23:21)  POCT Blood Glucose.: 136 mg/dL (12 Mar 2023 16:35)  POCT Blood Glucose.: 204 mg/dL (12 Mar 2023 11:21)    Medications:  MEDICATIONS  (STANDING):  acetylcysteine 20%  Inhalation 4 milliLiter(s) Inhalation every 6 hours  aMIOdarone    Tablet 400 milliGRAM(s) Oral two times a day  atorvastatin 20 milliGRAM(s) Oral at bedtime  cefepime   IVPB 2000 milliGRAM(s) IV Intermittent every 8 hours  cefepime   IVPB 2000 milliGRAM(s) IV Intermittent once  chlorhexidine 0.12% Liquid 15 milliLiter(s) Oral Mucosa every 12 hours  chlorhexidine 2% Cloths 1 Application(s) Topical <User Schedule>  enoxaparin Injectable 40 milliGRAM(s) SubCutaneous <User Schedule>  fluticasone propionate 50 MICROgram(s)/spray Nasal Spray 1 Spray(s) Both Nostrils two times a day  insulin glargine Injectable (LANTUS) 10 Unit(s) SubCutaneous at bedtime  insulin regular  human corrective regimen sliding scale   SubCutaneous every 6 hours  insulin regular  human recombinant 6 Unit(s) SubCutaneous every 6 hours  iron sucrose IVPB 300 milliGRAM(s) IV Intermittent every 24 hours  levETIRAcetam 750 milliGRAM(s) Oral two times a day  metoprolol tartrate 25 milliGRAM(s) Oral every 12 hours  sodium chloride 3%  Inhalation 4 milliLiter(s) Inhalation every 6 hours  sucralfate 1 Gram(s) Oral every 12 hours  vancomycin  IVPB 1000 milliGRAM(s) IV Intermittent every 12 hours    MEDICATIONS  (PRN):  acetaminophen     Tablet .. 650 milliGRAM(s) Oral every 6 hours PRN Temp greater or equal to 38C (100.4F), Mild Pain (1 - 3)  albuterol/ipratropium for Nebulization 3 milliLiter(s) Nebulizer every 6 hours PRN Shortness of Breath and/or Wheezing  ondansetron Injectable 4 milliGRAM(s) IV Push every 8 hours PRN Nausea and/or Vomiting  sodium chloride 0.9% lock flush 10 milliLiter(s) IV Push every 1 hour PRN Pre/post blood products, medications, blood draw, and to maintain line patency    Admitting Anthropometrics:  Height for BMI (FEET)	5 Feet  Height for BMI (INCHES)	7 Inch(s)  Height for BMI (CENTIMETERS)	170.18 Centimeter(s)  Weight for BMI (lbs)	157 lb  Weight for BMI (kg)	71.2 kg  Body Mass Index	24.5     Weight:  Daily     Daily     Weight Change:     Nutrition Focused Physical Exam: Completed [   ]  Not Pertinent [ x  ]    Estimated energy needs:     Subjective:   67 y/o right handed male with pmhx of T2DM, HLD, JAGDISH, hyponatremia, tracheal stenosis s/p tracheostomy, and glioblastoma now s/p cerebral angiogram with Avastin treatment (3/7). c-diff sent for multiple loose BMs/febrile/off bowel regimen since 3/7.     On assessment, pt resting in bed on full vent support. Vent; CPAP to vent. Tmax 99F. MAP 64-85 this am- off all sedation and pressor support. Currently remains NPO and started on EN feeds now running at goal rate meeting 100% of est needs. No n/v/c. C-diff cultures sent due to pt having multiple loose BMs. No abd distention or discomfort noted. Education deferred at this time. Please see nutr recs below.    Previous Nutrition Diagnosis: Inadequate Energy Intake RT inability to meet est needs on current diet AEB NPO, meeting 0% of est needs at this time    Active [   ]  Resolved [ x  ]    If resolved, new PES: Inadequate oral intake RT inability to meet est needs via PO AEB NPO, reliant on EN feeds to meet 100% of est needs    Goal:  Consistently meet >75% est needs via feasible route    Recommendations:  1. Cont with current EN regimen: Osmolite 1.2 @ 65 ml/hr x24hrs via NGT with x1 LPS to provide 1560 ml TV, 1972 kcal, 101 g pro, 1258 ml free water.   >> Recommend addition of Banatrol TID  >> FWF per team  >> Maintain aspiration precautions at all times  2. Pain and bowel regimen per team   3. Cont to monitor lytes and replete prn   4. 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      PO Intake: Good (%) [   ]  Fair (50-75%) [   ] Poor (<25%) [   ]- N/A    GI Issues:   WDL, last BM 3/12  No n/v/c reported. +multiple bouts of loose stools  No abd distention  NGT in place for EN feeds    Pain:  No pain noted    Skin Integrity:  Surgical incision, diana score 13  No edema noted  No pressure ulcers noted    Labs:   03-13    132<L>  |  96  |  18  ----------------------------<  184<H>  3.6   |  26  |  0.59    Ca    7.8<L>      13 Mar 2023 05:30  Phos  2.1     03-13  Mg     1.9     03-13    TPro  5.4<L>  /  Alb  2.6<L>  /  TBili  0.9  /  DBili  x   /  AST  29  /  ALT  41  /  AlkPhos  72  03-13    CAPILLARY BLOOD GLUCOSE    POCT Blood Glucose.: 133 mg/dL (13 Mar 2023 06:32)  POCT Blood Glucose.: 223 mg/dL (12 Mar 2023 23:21)  POCT Blood Glucose.: 136 mg/dL (12 Mar 2023 16:35)  POCT Blood Glucose.: 204 mg/dL (12 Mar 2023 11:21)    Medications:  MEDICATIONS  (STANDING):  acetylcysteine 20%  Inhalation 4 milliLiter(s) Inhalation every 6 hours  aMIOdarone    Tablet 400 milliGRAM(s) Oral two times a day  atorvastatin 20 milliGRAM(s) Oral at bedtime  cefepime   IVPB 2000 milliGRAM(s) IV Intermittent every 8 hours  cefepime   IVPB 2000 milliGRAM(s) IV Intermittent once  chlorhexidine 0.12% Liquid 15 milliLiter(s) Oral Mucosa every 12 hours  chlorhexidine 2% Cloths 1 Application(s) Topical <User Schedule>  enoxaparin Injectable 40 milliGRAM(s) SubCutaneous <User Schedule>  fluticasone propionate 50 MICROgram(s)/spray Nasal Spray 1 Spray(s) Both Nostrils two times a day  insulin glargine Injectable (LANTUS) 10 Unit(s) SubCutaneous at bedtime  insulin regular  human corrective regimen sliding scale   SubCutaneous every 6 hours  insulin regular  human recombinant 6 Unit(s) SubCutaneous every 6 hours  iron sucrose IVPB 300 milliGRAM(s) IV Intermittent every 24 hours  levETIRAcetam 750 milliGRAM(s) Oral two times a day  metoprolol tartrate 25 milliGRAM(s) Oral every 12 hours  sodium chloride 3%  Inhalation 4 milliLiter(s) Inhalation every 6 hours  sucralfate 1 Gram(s) Oral every 12 hours  vancomycin  IVPB 1000 milliGRAM(s) IV Intermittent every 12 hours    MEDICATIONS  (PRN):  acetaminophen     Tablet .. 650 milliGRAM(s) Oral every 6 hours PRN Temp greater or equal to 38C (100.4F), Mild Pain (1 - 3)  albuterol/ipratropium for Nebulization 3 milliLiter(s) Nebulizer every 6 hours PRN Shortness of Breath and/or Wheezing  ondansetron Injectable 4 milliGRAM(s) IV Push every 8 hours PRN Nausea and/or Vomiting  sodium chloride 0.9% lock flush 10 milliLiter(s) IV Push every 1 hour PRN Pre/post blood products, medications, blood draw, and to maintain line patency    Admitting Anthropometrics:  Height for BMI (FEET)	5 Feet  Height for BMI (INCHES)	7 Inch(s)  Height for BMI (CENTIMETERS)	170.18 Centimeter(s)  Weight for BMI (lbs)	157 lb  Weight for BMI (kg)	71.2 kg  Body Mass Index	24.5     Weight Change:   No new weights obtained during this admission. please cont to trend weights when feasible    Nutrition Focused Physical Exam: Completed [   ]  Not Pertinent [ x  ]    Estimated energy needs:   IBW used for calculations as pt >100% of IBW (106%). Needs adjusted for critical care requiring vent support, advanced age. Fluid per team.  Kcal (25-30 kcal/kg): 5326-0033 kcal  Protein (1.4-1.6 g/kg): 93-107g pro    Subjective:   67 y/o right handed male with pmhx of T2DM, HLD, JAGDISH, hyponatremia, tracheal stenosis s/p tracheostomy, and glioblastoma now s/p cerebral angiogram with Avastin treatment (3/7). c-diff sent for multiple loose BMs/febrile/off bowel regimen since 3/7.     On assessment, pt resting in bed on full vent support. Vent; CPAP to vent. Tmax 99F. MAP 64-85 this am- off all sedation and pressor support. Currently remains NPO and started on EN feeds now running at goal rate meeting 100% of est needs. No n/v/c. C-diff cultures sent due to pt having multiple loose BMs. No abd distention or discomfort noted. Education deferred at this time. Please see nutr recs below.    Previous Nutrition Diagnosis: Inadequate Energy Intake RT inability to meet est needs on current diet AEB NPO, meeting 0% of est needs at this time    Active [   ]  Resolved [ x  ]    If resolved, new PES: Inadequate oral intake RT inability to meet est needs via PO AEB NPO, reliant on EN feeds to meet 100% of est needs    Goal:  Consistently meet >75% est needs via feasible route    Recommendations:  1. Cont with current EN regimen: Osmolite 1.2 @ 65 ml/hr x24hrs via NGT with x1 LPS to provide 1560 ml TV, 1972 kcal, 101 g pro, 1258 ml free water.   >> Recommend addition of Banatrol TID  >> FWF per team  >> Maintain aspiration precautions at all times  2. Pain and bowel regimen per team   3. Cont to monitor lytes and replete prn   4. RD diet edu prn    Education:   Deferred at this time    Risk Level: High [ x  ] Moderate [   ] Low [   ]

## 2023-03-13 NOTE — CHART NOTE - NSCHARTNOTEFT_GEN_A_CORE
Infectious Diseases Anti-infective Approval Note    Medication: cefepime   Dose*: 2g  Route: IV  Frequency: q8h  Duration**: 7d    *Dose may be adjusted as needed for alterations in renal function.    **Reflects duration of approval and not necessarily duration of therapy     THIS IS NOT AN INFECTIOUS DISEASES CONSULTATION

## 2023-03-13 NOTE — PROGRESS NOTE ADULT - SUBJECTIVE AND OBJECTIVE BOX
SUBJECTIVE / INTERVAL HPI: Patient was seen and examined this morning.     CAPILLARY BLOOD GLUCOSE & INSULIN RECEIVED  235 mg/dL (03-12 @ 04:28)  259 mg/dL (03-12 @ 06:24)  204 mg/dL (03-12 @ 11:21)  136 mg/dL (03-12 @ 16:35)  223 mg/dL (03-12 @ 23:21)  184 mg/dL (03-13 @ 05:30)  133 mg/dL (03-13 @ 06:32)  96 mg/dL (03-13 @ 10:41)      REVIEW OF SYSTEMS  Constitutional:  Negative fever, chills or loss of appetite.  Eyes:  Negative blurry vision or double vision.  Cardiovascular:  Negative for chest pain or palpitations.  Respiratory:  Negative for cough, wheezing, or shortness of breath.    Gastrointestinal:  Negative for nausea, vomiting, diarrhea, constipation, or abdominal pain.  Genitourinary:  Negative frequency, urgency or dysuria.  Neurologic:  No headache, confusion, dizziness, lightheadedness.    PHYSICAL EXAM  Vital Signs Last 24 Hrs  T(C): 37.2 (13 Mar 2023 09:00), Max: 38.4 (12 Mar 2023 21:53)  T(F): 99 (13 Mar 2023 09:00), Max: 101.2 (12 Mar 2023 21:53)  HR: 94 (13 Mar 2023 13:00) (80 - 103)  BP: 99/71 (13 Mar 2023 07:00) (86/57 - 112/76)  BP(mean): 80 (13 Mar 2023 07:00) (67 - 89)  RR: 18 (13 Mar 2023 13:00) (17 - 24)  SpO2: 92% (13 Mar 2023 13:00) (92% - 100%)    Parameters below as of 13 Mar 2023 13:00  Patient On (Oxygen Delivery Method): tracheostomy collar    O2 Concentration (%): 40    Constitutional: Awake, alert, in no acute distress.   HEENT: Normocephalic, atraumatic, JUAN.  Respiratory: Lungs clear to ausculation bilaterally.   Cardiovascular: regular rhythm, normal S1 and S2, no audible murmurs.   GI: soft, non-tender, non-distended, bowel sounds present.  Extremities: No lower extremity edema.  Psychiatric: AAO x 3. Normal affect/mood.     LABS  CBC - WBC/HGB/HTC/PLT: 5.98/9.8/28.1/108 (03-13-23)  BMP - Na/K/Cl/Bicarb/BUN/Cr/Gluc/AG/eGFR: 132/3.6/96/26/18/0.59/184/10/106 (03-13-23)  Ca - 7.8 (03-13-23)  Phos - 2.1 (03-13-23)  Mg - 1.9 (03-13-23)  LFT - Alb/Tprot/Tbili/Dbili/AlkPhos/ALT/AST: 2.6/--/0.9/--/72/41/29 (03-13-23)  PT/aPTT/INR: 18.1/28.7/1.51 (03-12-23)   Thyroid Stimulating Hormone, Serum: 1.210 (03-08-23)      MEDICATIONS  MEDICATIONS  (STANDING):  acetylcysteine 20%  Inhalation 4 milliLiter(s) Inhalation every 6 hours  aMIOdarone    Tablet 400 milliGRAM(s) Oral two times a day  atorvastatin 20 milliGRAM(s) Oral at bedtime  cefepime   IVPB 2000 milliGRAM(s) IV Intermittent every 8 hours  chlorhexidine 0.12% Liquid 15 milliLiter(s) Oral Mucosa every 12 hours  chlorhexidine 2% Cloths 1 Application(s) Topical <User Schedule>  enoxaparin Injectable 40 milliGRAM(s) SubCutaneous <User Schedule>  fluticasone propionate 50 MICROgram(s)/spray Nasal Spray 1 Spray(s) Both Nostrils two times a day  insulin glargine Injectable (LANTUS) 10 Unit(s) SubCutaneous at bedtime  insulin regular  human corrective regimen sliding scale   SubCutaneous every 6 hours  insulin regular  human recombinant 6 Unit(s) SubCutaneous every 6 hours  iron sucrose IVPB 300 milliGRAM(s) IV Intermittent every 24 hours  levETIRAcetam 750 milliGRAM(s) Oral two times a day  metoprolol tartrate 25 milliGRAM(s) Oral every 12 hours  sodium chloride 3%  Inhalation 4 milliLiter(s) Inhalation every 6 hours  sucralfate 1 Gram(s) Oral every 12 hours    MEDICATIONS  (PRN):  acetaminophen     Tablet .. 650 milliGRAM(s) Oral every 6 hours PRN Temp greater or equal to 38C (100.4F), Mild Pain (1 - 3)  albuterol/ipratropium for Nebulization 3 milliLiter(s) Nebulizer every 6 hours PRN Shortness of Breath and/or Wheezing  ondansetron Injectable 4 milliGRAM(s) IV Push every 8 hours PRN Nausea and/or Vomiting  sodium chloride 0.9% lock flush 10 milliLiter(s) IV Push every 1 hour PRN Pre/post blood products, medications, blood draw, and to maintain line patency    ASSESSMENT / RECOMMENDATIONS    A1C: 5.8 %  BUN: 18  Creatinine: 0.59  GFR: 106  Weight: 71.214  BMI: 24.6  EF:     # Type 2 diabetes mellitus  - Please continue lantus *** units at bedtime.   - Continue lispro *** units before each meal.  - Continue lispro moderate / low dose sliding scale before meals and at bedtime.  - Patient's fingerstick glucose goal is 100-180 mg/dL.    - For discharge, patient can ***.    - Patient can follow up at discharge with [facility] by calling [phone] to make an appointment.      Case discussed with Dr. Hoang. Primary team updated.       Fabrice Madrid    Endocrinology Fellow    Service Pager: [phone]    SUBJECTIVE / INTERVAL HPI: Patient was seen and examined this morning. He was extubated to trach collar earlier today. Blood glucose levels have been mostly within target; however, occasionally <100 mg/dL. He continues to receive tube feeds at goal without interruptions.     CAPILLARY BLOOD GLUCOSE & INSULIN RECEIVED  204 mg/dL (03-12 @ 11:21) ? 2 units of regular insulin + 4 units of sliding scale.   136 mg/dL (03-12 @ 16:35) ? 2 units of regular insulin.  223 mg/dL (03-12 @ 23:21) ? 10 units of lantus + 2 units of regular insulin + 2 units of sliding scale.  133 mg/dL (03-13 @ 06:32) ? 6 units of regular insulin.   96 mg/dL (03-13 @ 10:41) ? 6 units of regular insulin.   125 mg/dL (03-13 @ 16:46) ? 5 units of regular insulin.     PHYSICAL EXAM  Vital Signs Last 24 Hrs  T(C): 37.2 (13 Mar 2023 09:00), Max: 38.4 (12 Mar 2023 21:53)  T(F): 99 (13 Mar 2023 09:00), Max: 101.2 (12 Mar 2023 21:53)  HR: 94 (13 Mar 2023 13:00) (80 - 103)  BP: 99/71 (13 Mar 2023 07:00) (86/57 - 112/76)  BP(mean): 80 (13 Mar 2023 07:00) (67 - 89)  RR: 18 (13 Mar 2023 13:00) (17 - 24)  SpO2: 92% (13 Mar 2023 13:00) (92% - 100%)    Parameters below as of 13 Mar 2023 13:00  Patient On (Oxygen Delivery Method): tracheostomy collar    O2 Concentration (%): 40    Constitutional: Awake, alert, on mechanical ventilation via tracheostomy, following commands.   Neck: (+) Tracheostomy,  Respiratory: Lungs clear to ausculation bilaterally.   Cardiovascular: regular rhythm, normal S1 and S2, no audible murmurs.   GI: soft, non-distended, bowel sounds present.  Extremities: No lower extremity edema.    LABS  CBC - WBC/HGB/HTC/PLT: 5.98/9.8/28.1/108 (03-13-23)  BMP - Na/K/Cl/Bicarb/BUN/Cr/Gluc/AG/eGFR: 132/3.6/96/26/18/0.59/184/10/106 (03-13-23)  Ca - 7.8 (03-13-23)  Phos - 2.1 (03-13-23)  Mg - 1.9 (03-13-23)  LFT - Alb/Tprot/Tbili/Dbili/AlkPhos/ALT/AST: 2.6/--/0.9/--/72/41/29 (03-13-23)  PT/aPTT/INR: 18.1/28.7/1.51 (03-12-23)   Thyroid Stimulating Hormone, Serum: 1.210 (03-08-23)    MEDICATIONS  MEDICATIONS  (STANDING):  acetylcysteine 20%  Inhalation 4 milliLiter(s) Inhalation every 6 hours  aMIOdarone    Tablet 400 milliGRAM(s) Oral two times a day  atorvastatin 20 milliGRAM(s) Oral at bedtime  cefepime   IVPB 2000 milliGRAM(s) IV Intermittent every 8 hours  chlorhexidine 0.12% Liquid 15 milliLiter(s) Oral Mucosa every 12 hours  chlorhexidine 2% Cloths 1 Application(s) Topical <User Schedule>  enoxaparin Injectable 40 milliGRAM(s) SubCutaneous <User Schedule>  fluticasone propionate 50 MICROgram(s)/spray Nasal Spray 1 Spray(s) Both Nostrils two times a day  insulin glargine Injectable (LANTUS) 10 Unit(s) SubCutaneous at bedtime  insulin regular  human corrective regimen sliding scale   SubCutaneous every 6 hours  insulin regular  human recombinant 6 Unit(s) SubCutaneous every 6 hours  iron sucrose IVPB 300 milliGRAM(s) IV Intermittent every 24 hours  levETIRAcetam 750 milliGRAM(s) Oral two times a day  metoprolol tartrate 25 milliGRAM(s) Oral every 12 hours  sodium chloride 3%  Inhalation 4 milliLiter(s) Inhalation every 6 hours  sucralfate 1 Gram(s) Oral every 12 hours    MEDICATIONS  (PRN):  acetaminophen     Tablet .. 650 milliGRAM(s) Oral every 6 hours PRN Temp greater or equal to 38C (100.4F), Mild Pain (1 - 3)  albuterol/ipratropium for Nebulization 3 milliLiter(s) Nebulizer every 6 hours PRN Shortness of Breath and/or Wheezing  ondansetron Injectable 4 milliGRAM(s) IV Push every 8 hours PRN Nausea and/or Vomiting  sodium chloride 0.9% lock flush 10 milliLiter(s) IV Push every 1 hour PRN Pre/post blood products, medications, blood draw, and to maintain line patency    ASSESSMENT / RECOMMENDATIONS  Mr. Norwood is a 68-year-old male with a past medical history of type 2 diabetes mellitus, hyperlipidemia, iron deficiency anemia, tracheal stenosis (s/p tracheostomy) and glioblastoma (found to have a mass in 1/2022, s/p resection of left frontal enhancing tumor with GLEOLAN placed on 1/27/22 with pathology showing gliosarcoma, WHO grade IV s/p radiation and chemotherapy completed on 12/2022, repeat MRI showing recurrence of brain mass on 12/2022) who presented for further management, now s/p cerebral angiogram with Avastin treatment (3/7/23). Post-operative course was complicated by flash pulmonary edema, desaturation, new global aphasia, right sided weakness, lactic acidosis and hyperglycemia. Endocrinology was consulted for recommendations regarding glycemic control.     A1C: 5.8 %  BUN: 18  Creatinine: 0.59  GFR: 106  Weight: 71.214  BMI: 24.6    # Type 2 diabetes mellitus  - Blood glucose levels have been mostly within target; however, occasionally <100 mg/dL.   - Please continue with lantus 10 units at bedtime.   - Decrease regular insulin to 5 units every 6 hours while on tube feeds.   - Continue regular insulin moderate dose sliding scale every 6 hours.  - Patient's fingerstick glucose goal is 100-180 mg/dL.      Case discussed with Dr. Hoang. Primary team updated.       Fabrice Madrid    Endocrinology Fellow    Service Pager: [phone]

## 2023-03-13 NOTE — PROGRESS NOTE ADULT - ASSESSMENT
68M PMH T2DM, HLD, JAGDISH, hyponatremia, tracheal stenosis s/p tracheostomy, and glioblastoma s/p resection of the left frontal enhancing tumor admitted for cerebral angiogram with Avastin treatment in the setting of a recurrent mass c/b stress cardiomyopathy 2/2 excess norepinephrine.    Review of studies:  ECG: NSR, 1st degree AVB, 0.5mm- 1mm DAVID v1-v3, IVCD  TTE 3/2021: Normal Bi-V function  TTE with severely reduced LV systolic function EF 15-20%, apex and mid segments are akinetic  TTE 3/8/23: LVEF 15-20% . The entire apex and all the mid segments are akinetic, with relative sparing of the basal segments. In the absence of ischemic heart disease, this is suggestive of Takotsubo's cardiomyopathy. Clinical correlation is advised. With echocontrast imaging, there is no evidence of left ventricular thrombus. Normal right ventricular size and systolic function. Normal atria. Moderate tricuspid regurgitation. PASP 40 mmHg. No pericardial effusion. Mildly dilated aortic root.     #Stress induced cardiomyopathy (Takotsubo cardiomyopathy)   Likely due to excess norepinephrine given relative good functional capacity, and normal TTE prior to the procedure.  Troponin peaked 0.32, no need to trend further. Off vasopressor support since 3/10. Briefly placed on neosynephrine during SVT episode, otherwise hemodynamically stable once in sinus rhythm.  - Would hold off on repeating TTE until later in the week givenrecent stressful event + new onset SVT's during the weekend.     #SVT/Atrial fibrillation  Telemetry 3/11/23: one episode of atrial fibrillation and SVT to 180s in the late morning - subsequent sinus rhythm s/p IV lopressor.  - Lopressor 25mg BID  - s/p IV amiodarone load. Continue with Amiodarone 400mg BID to complete load and then transition to amiodarone 200mg maintenance dose.   - SVT can be seen in the setting of sepsis (patient is febrile and found to have a consolidation on CXR. Being treated for pseudomonal pneumonia.). After resolution of infection, rate control requirements may decrease.

## 2023-03-13 NOTE — PROGRESS NOTE ADULT - SUBJECTIVE AND OBJECTIVE BOX
INTERVAL HPI/OVERNIGHT EVENTS: Low grade fever overnight. Intermittently febrile prior day (3/12/2023)    SUBJECTIVE: Patient seen and examined at bedside. On Trach Collar. Off of pressors. No complaints.     OBJECTIVE:    VITAL SIGNS:  ICU Vital Signs Last 24 Hrs  T(C): 37.3 (11 Mar 2023 14:04), Max: 38.6 (11 Mar 2023 09:12)  T(F): 99.2 (11 Mar 2023 14:04), Max: 101.4 (11 Mar 2023 09:12)  HR: 95 (11 Mar 2023 15:00) (88 - 181)  BP: 102/61 (11 Mar 2023 10:00) (90/71 - 123/80)  BP(mean): 77 (11 Mar 2023 10:00) (70 - 96)  ABP: 110/58 (11 Mar 2023 15:00) (89/59 - 151/72)  ABP(mean): 74 (11 Mar 2023 15:00) (67 - 93)  RR: 18 (11 Mar 2023 15:00) (15 - 24)  SpO2: 95% (11 Mar 2023 15:00) (85% - 99%)    O2 Parameters below as of 11 Mar 2023 15:00  Patient On (Oxygen Delivery Method): ventilator    O2 Concentration (%): 55      Mode: AC/ CMV (Assist Control/ Continuous Mandatory Ventilation), RR (machine): 12, TV (machine): 400, FiO2: 55, PEEP: 8, ITime: 1, MAP: 10, PIP: 13    03-10 @ 07:01 - 03-11 @ 07:00  --------------------------------------------------------  IN: 1281 mL / OUT: 1025 mL / NET: 256 mL    03-11 @ 06:01  - 03-11 @ 15:03  --------------------------------------------------------  IN: 183.3 mL / OUT: 550 mL / NET: -366.7 mL      CAPILLARY BLOOD GLUCOSE      POCT Blood Glucose.: 181 mg/dL (11 Mar 2023 11:06)      PHYSICAL EXAM:    GENERAL: NAD, well-developed  HEAD:  Atraumatic, Normocephalic  EYES: EOMI, conjunctiva and sclera clear  NECK: Supple, No JVD, s/p tracheostomy connected to ventilator  CHEST/LUNG: Clear to auscultation bilaterally; No wheeze  HEART: Regular rate and rhythm; No murmurs, rubs, or gallops  ABDOMEN: Soft, Nontender, Nondistended; Bowel sounds present  EXTREMITIES:  2+ Peripheral Pulses, No clubbing, cyanosis, or edema  PSYCH: AAOx3  NEUROLOGY: non-focal  SKIN: No rashes or lesions      MEDICATIONS:  MEDICATIONS  (STANDING):  acetylcysteine 20%  Inhalation 4 milliLiter(s) Inhalation every 6 hours  aMIOdarone Infusion 1 mG/Min (33.3 mL/Hr) IV Continuous <Continuous>  aMIOdarone Infusion 0.5 mG/Min (16.7 mL/Hr) IV Continuous <Continuous>  atorvastatin 20 milliGRAM(s) Oral at bedtime  chlorhexidine 0.12% Liquid 15 milliLiter(s) Oral Mucosa every 12 hours  chlorhexidine 2% Cloths 1 Application(s) Topical <User Schedule>  enoxaparin Injectable 40 milliGRAM(s) SubCutaneous <User Schedule>  fluticasone propionate 50 MICROgram(s)/spray Nasal Spray 1 Spray(s) Both Nostrils two times a day  insulin glargine Injectable (LANTUS) 10 Unit(s) SubCutaneous at bedtime  insulin regular  human corrective regimen sliding scale   SubCutaneous every 6 hours  levETIRAcetam 750 milliGRAM(s) Oral two times a day  metoprolol tartrate 12.5 milliGRAM(s) Oral every 12 hours  phenylephrine    Infusion 0.1 MICROgram(s)/kG/Min (2.67 mL/Hr) IV Continuous <Continuous>  sodium chloride 3%  Inhalation 4 milliLiter(s) Inhalation every 6 hours  sucralfate 1 Gram(s) Oral every 12 hours    MEDICATIONS  (PRN):  acetaminophen     Tablet .. 650 milliGRAM(s) Oral every 6 hours PRN Temp greater or equal to 38C (100.4F), Mild Pain (1 - 3)  albuterol/ipratropium for Nebulization 3 milliLiter(s) Nebulizer every 6 hours PRN Shortness of Breath and/or Wheezing  ondansetron Injectable 4 milliGRAM(s) IV Push every 8 hours PRN Nausea and/or Vomiting  sodium chloride 0.9% lock flush 10 milliLiter(s) IV Push every 1 hour PRN Pre/post blood products, medications, blood draw, and to maintain line patency      ALLERGIES:  amoxicillin (Rash)    Intolerances    LABS:                        9.7    6.47  )-----------( 63       ( 11 Mar 2023 02:32 )             28.0     03-11    131<L>  |  95<L>  |  14  ----------------------------<  152<H>  4.1   |  28  |  0.62    Ca    8.2<L>      11 Mar 2023 02:32  Phos  2.5     03-11  Mg     2.0     03-11    RADIOLOGY & ADDITIONAL TESTS: Reviewed.

## 2023-03-13 NOTE — PROGRESS NOTE ADULT - ATTENDING COMMENTS
Pt seen on rounds this afternoon and events of the weekend reviewed  He remains alert and communicative, but is still on tube feeds (which he is tolerating quite well).  Has not yet been evaluated by Speech/Swallow team  He has no complaints of nausea or fullness.  Abdomen is soft and non-distended  Glucoses have been fluctuating between 100 and 220 mg%, with a tendency to drop when he receives 6 units of regular (the standing dose of 4 units plus 2 units correction if over 150).  Have not been able to evaluate his response to a regular insulin dose of 4 units without any additional coverage.  Will continue the Lantus at 10 units, increase the standing regular to 5 units q6h

## 2023-03-13 NOTE — PROGRESS NOTE ADULT - ASSESSMENT
68M with pmhx of T2DM, HLD, JAGDISH, hyponatremia, tracheal stenosis s/p tracheostomy, and glioblastoma now s/p cerebral angiogram with Avastin treatment. Hematology consulted for thrombocytopenia.    Grade II Thrombocytopenia, Plt 63k-->79K -->108 (today)  Pt has had a history of thrombocytopenia with his baseline range  since the latter half of last year.   He was previously on temozolomide and RT (completed treatment in 12/2022), which coincides with his noted thrombocytopenia. He received Avastin on 3/7, and has a new flores of 63k today.  Peripheral slide reviewed - rare schistocytes 0-1/hpf,   Plasmic score 3 pts, low risk of TTP  4Ts score 2 pts, low probability of HIT  No other new medications were started that could cause DITP.  No clinical signs of DIC on exam, however, will need labs to support this. Labs reviewed, DIC ruled out.  Thrombocytopenia is most likely 2/2 recent Avastin treatment, as Avastin is known to cause thrombocytopenia in up to 58% of patients (Uptodate).  Transfuse 1U plt if bleeding and platelet count <50k, if febrile and platelet count <20k, or if platelet count <10k    DVT RLE IM calf  -new DVT, pt only on prophylactic dose Lovenox  -suspect fever is 2/2 DVT  -DVT needs to be treated given risk of embolization. Recommend heparin gtt (full anticoagulation dosing) as this can be easily reversed if there are signs of bleeding, or if noted acute drop in platelets. Can transition back to anticoagulation dose Lovenox if CBC remains stable, without bleeding.    Anemia  Hb 9.7, on admission 11.7, MCV 94.9  No episodes of bleeding.  -iron 13, %sat 6, ferritin 195, ok to give IV iron sucrose 300mg daily for 3 days.   -labs reviewed, no signs of hemolysis.    Pt discussed with Dr. Houser   68M with pmhx of T2DM, HLD, JAGDISH, hyponatremia, tracheal stenosis s/p tracheostomy, and glioblastoma now s/p cerebral angiogram with Avastin treatment. Hematology consulted for thrombocytopenia.    Grade II Thrombocytopenia, Plt 63k-->79K -->108 (today)  Pt has had a history of thrombocytopenia with his baseline range  since the latter half of last year.   He was previously on temozolomide and RT (completed treatment in 12/2022), which coincides with his noted thrombocytopenia. He received Avastin on 3/7, and has a new flores of 63k today.  Peripheral slide reviewed - rare schistocytes 0-1/hpf,   Plasmic score 3 pts, low risk of TTP  4Ts score 2 pts, low probability of HIT  No other new medications were started that could cause DITP.  No clinical signs of DIC on exam, however, will need labs to support this. Labs reviewed, DIC ruled out.  Thrombocytopenia is most likely 2/2 recent Avastin treatment, as Avastin is known to cause thrombocytopenia in up to 58% of patients (Uptodate). It is also likely related to possible underlying infection.   Transfuse 1U plt if bleeding and platelet count <50k, if febrile and platelet count <20k, or if platelet count <10k    DVT RLE IM calf  -new DVT, pt only on prophylactic dose Lovenox  -DVT needs to be treated given risk of embolization. Recommend full dose AC if there are no contraindications from neurosurgery perspective with heparin gtt as this can be easily reversed if there are signs of bleeding, or worsening thrombocytopenia. On discharge, can start oral anticoagulant with DOAC for atleast 3 months. Duration of AC may need to extended as patient as ongoing provoking factor (i.e. active malignancy).   - Hold AC if platelet count <50K.   - If unable to fully anticoagulate, recommend vascular consult for IVC filter.       Anemia  Hb 9.7, on admission 11.7, MCV 94.9  No episodes of bleeding.  -iron 13, %sat 6, ferritin 195, ok to give IV iron sucrose 300mg daily for 3 days.   -labs reviewed, no signs of hemolysis.    Discussed with Dr. Houser   68M with pmhx of T2DM, HLD, JAGDISH, hyponatremia, tracheal stenosis s/p tracheostomy, and glioblastoma now s/p cerebral angiogram with Avastin treatment. Hematology consulted for thrombocytopenia.    Grade II Thrombocytopenia, Plt 63k-->79K -->108 (today)  Pt has had a history of thrombocytopenia with his baseline range  since the latter half of last year.   He was previously on temozolomide and RT (completed treatment in 12/2022), which coincides with his noted thrombocytopenia. He received Avastin on 3/7, and has a new flores of 63k today.  Peripheral slide reviewed - rare schistocytes 0-1/hpf,   Plasmic score 3 pts, low risk of TTP  4Ts score 2 pts, low probability of HIT  No other new medications were started that could cause DITP.  No clinical signs of DIC on exam, however, will need labs to support this. Labs reviewed, DIC ruled out.  Thrombocytopenia is most likely 2/2 recent Avastin treatment, as Avastin is known to cause thrombocytopenia in up to 58% of patients (Uptodate). It is also likely related to possible underlying infection.   Transfuse 1U plt if bleeding and platelet count <50k, if febrile and platelet count <20k, or if platelet count <10k    DVT RLE IM calf  -new DVT, pt only on prophylactic dose Lovenox  -DVT needs to be treated given risk of embolization. Recommend full dose AC if there are no contraindications from neurosurgery perspective with heparin gtt as this can be easily reversed if there are signs of bleeding, or worsening thrombocytopenia. On discharge, can start oral anticoagulant with DOAC for atleast 3 months. Duration of AC may need to extended as patient as ongoing provoking factor (i.e. active malignancy).   - Hold AC if platelet count <50K. Current platelet count is not a contraindication for AC   - If unable to fully anticoagulate, recommend vascular consult for IVC filter.       Anemia  Hb 9.7, on admission 11.7, MCV 94.9  No episodes of bleeding.  -iron 13, %sat 6, ferritin 195, ok to give IV iron sucrose 300mg daily for 3 days.   -labs reviewed, no signs of hemolysis.    Discussed with Dr. Houser

## 2023-03-13 NOTE — PROGRESS NOTE ADULT - SUBJECTIVE AND OBJECTIVE BOX
Interval Events: Reviewed  Patient seen and examined at bedside.    Patient is a 68y old  Male who presents with a chief complaint of cerebral angiogram with Avastin (13 Mar 2023 19:10)  he is off vent and not sob,  He is obeying commands and has good cough    PAST MEDICAL & SURGICAL HISTORY:  Hypertension      Glioblastoma  Grade 4 Gliosarcoma dx 2022      Type 2 diabetes mellitus      Hyperlipidemia      Iron deficiency anemia      Nephrolithiasis      Thrombocytopenia      Hyponatremia      BPH (benign prostatic hyperplasia)      S/P craniotomy      H/O tracheostomy          MEDICATIONS:  Pulmonary:  acetylcysteine 20%  Inhalation 4 milliLiter(s) Inhalation every 6 hours  albuterol/ipratropium for Nebulization 3 milliLiter(s) Nebulizer every 6 hours PRN  sodium chloride 3%  Inhalation 4 milliLiter(s) Inhalation every 6 hours    Antimicrobials:  cefepime   IVPB 2000 milliGRAM(s) IV Intermittent every 8 hours    Anticoagulants:  enoxaparin Injectable 40 milliGRAM(s) SubCutaneous <User Schedule>    Cardiac:  aMIOdarone    Tablet 400 milliGRAM(s) Oral two times a day  metoprolol tartrate 25 milliGRAM(s) Oral every 12 hours      Allergies    amoxicillin (Rash)    Intolerances        Vital Signs Last 24 Hrs  T(C): 37.4 (13 Mar 2023 21:36), Max: 37.4 (13 Mar 2023 05:11)  T(F): 99.4 (13 Mar 2023 21:36), Max: 99.4 (13 Mar 2023 21:36)  HR: 92 (13 Mar 2023 21:00) (80 - 103)  BP: 105/66 (13 Mar 2023 20:00) (86/57 - 113/74)  BP(mean): 81 (13 Mar 2023 20:00) (67 - 89)  RR: 17 (13 Mar 2023 21:00) (17 - 24)  SpO2: 91% (13 Mar 2023 21:00) (91% - 100%)    Parameters below as of 13 Mar 2023 21:00  Patient On (Oxygen Delivery Method): tracheostomy collar    O2 Concentration (%): 40    03-12 @ 07:01  -  03-13 @ 07:00  --------------------------------------------------------  IN: 2447 mL / OUT: 1300 mL / NET: 1147 mL     @ 07: @ 22:30  --------------------------------------------------------  IN: 1195 mL / OUT: 450 mL / NET: 745 mL      Mode: standby      Review of Systems:   •	General: negative  •	Skin/Breast: negative  •	Ophthalmologic: negative  •	ENMT: negative  •	Respiratory and Thorax: negative  •	Cardiovascular: negative  •	Gastrointestinal: negative  •	Genitourinary: negative  •	Musculoskeletal: negative  •	Neurological: negative  •	Psychiatric: negative  •	Hematology/Lymphatics: negative  •	Endocrine: negative  •	Allergic/Immunologic: negative    Physical Exam:   • Constitutional:	refer to the dietion /Nutritionist note  • Eyes:	EOMI; PERRL; no drainage or redness  • ENMT:	No oral lesions; no gross abnormalities  • Neck	No bruits; no thyromegaly or nodules  • Breasts:	not examined  • Back:	No deformity or limitation of movement  • Respiratory:	Breath Sounds equal & clear to percussion & auscultation, no accessory muscle use  • Cardiovascular:	Regular rate & rhythm, normal S1, S2; no murmurs, gallops or rubs; no S3, S4  • Gastrointestinal:	Soft, non-tender, no hepatosplenomegaly, normal bowel sounds  • Genitourinary:	not examined  • Rectal: not examined  • Extremities:	No cyanosis, clubbing or edema  • Vascular:	Equal and normal pulses (carotid, femoral, dorsalis pedis)  • Neurologica:l	not examined  • Skin:	No lesions; no rash  • Lymph Nodes:	No lymphadedenopathy  • Musculoskeletal:	No joint pain, swelling or deformity; no limitation of movement        LABS:  ABG - ( 13 Mar 2023 05:50 )  pH, Arterial: 7.52  pH, Blood: x     /  pCO2: 35    /  pO2: 163   / HCO3: 29    / Base Excess: 5.7   /  SaO2: 98.5                CBC Full  -  ( 13 Mar 2023 05:30 )  WBC Count : 5.98 K/uL  RBC Count : 2.95 M/uL  Hemoglobin : 9.8 g/dL  Hematocrit : 28.1 %  Platelet Count - Automated : 108 K/uL  Mean Cell Volume : 95.3 fl  Mean Cell Hemoglobin : 33.2 pg  Mean Cell Hemoglobin Concentration : 34.9 gm/dL  Auto Neutrophil # : x  Auto Lymphocyte # : x  Auto Monocyte # : x  Auto Eosinophil # : x  Auto Basophil # : x  Auto Neutrophil % : x  Auto Lymphocyte % : x  Auto Monocyte % : x  Auto Eosinophil % : x  Auto Basophil % : x        132<L>  |  96  |  18  ----------------------------<  184<H>  3.6   |  26  |  0.59    Ca    7.8<L>      13 Mar 2023 05:30  Phos  2.1       Mg     1.9         TPro  5.4<L>  /  Alb  2.6<L>  /  TBili  0.9  /  DBili  x   /  AST  29  /  ALT  41  /  AlkPhos  72  -13    PT/INR - ( 12 Mar 2023 04:28 )   PT: 18.1 sec;   INR: 1.51          PTT - ( 12 Mar 2023 04:28 )  PTT:28.7 sec      Urinalysis Basic - ( 12 Mar 2023 13:16 )    Color: Yellow / Appearance: Clear / S.025 / pH: x  Gluc: x / Ketone: Trace mg/dL  / Bili: Negative / Urobili: 4.0 E.U./dL   Blood: x / Protein: 30 mg/dL / Nitrite: POSITIVE   Leuk Esterase: NEGATIVE / RBC: > 10 /HPF / WBC < 5 /HPF   Sq Epi: x / Non Sq Epi: 0-5 /HPF / Bacteria: Present /HPF              Culture Results:   No growth at 1 day. ( @ 12:22)  Culture Results:   No growth at 1 day. ( @ 12:22)      RADIOLOGY & ADDITIONAL STUDIES (The following images were personally reviewed):

## 2023-03-14 NOTE — PROGRESS NOTE ADULT - ATTENDING COMMENTS
Pt seen on rounds this afternoon.  Breathing comfortably on trach collar.  Alert and does not seem to have any complaints.    Abdomen is soft, non-distended and non-tender.  Extr without edema  Glucoses have been fluctuating more than usual, partly because of holding, then changing of feeds, also because of intermittent holding of regular insulin doses.  Having less frequent bowel movements since change in feeds to Vital, though has been at goal rate only since early this afternoon.  Glucoses were 125/141 late yesterday, but then increased to 193 at 6 AM, dropped to 80 at noon because regular insulin given at 6 AM but feeds were running only at 20 cc/hr.  Regular insulin was then held at noon, feeds subsequently increased, and he is now up to 217 mg%.  The Vital is also somewhat higher in carb than the Osmolyte  --Continue lantus at 10 units hs  --Increase the regular insulin to 6 units q6h to cover the increased carb content of the Vital  --Await Speech/swallow eval

## 2023-03-14 NOTE — PROGRESS NOTE ADULT - SUBJECTIVE AND OBJECTIVE BOX
NSCU ATTENDING -- ADDITIONAL PROGRESS NOTE    Nighttime rounds were performed -- please refer to earlier Progress Note for HPI details.      ICU Vital Signs Last 24 Hrs  T(C): 36.3 (14 Mar 2023 17:50), Max: 37.6 (14 Mar 2023 08:58)  T(F): 97.3 (14 Mar 2023 17:50), Max: 99.7 (14 Mar 2023 08:58)  HR: 78 (14 Mar 2023 18:00) (78 - 109)  BP: 99/63 (14 Mar 2023 18:00) (99/63 - 120/79)  BP(mean): 77 (14 Mar 2023 18:00) (77 - 93)  ABP: 92/52 (14 Mar 2023 08:00) (92/52 - 125/53)  ABP(mean): 67 (14 Mar 2023 08:00) (67 - 117)  RR: 17 (14 Mar 2023 18:00) (16 - 18)  SpO2: 98% (14 Mar 2023 18:00) (91% - 100%)      03-13-23 @ 07:01  -  03-14-23 @ 07:00  --------------------------------------------------------  IN: 2205 mL / OUT: 900 mL / NET: 1305 mL    03-14-23 @ 07:01  -  03-14-23 @ 18:15  --------------------------------------------------------  IN: 395 mL / OUT: 650 mL / NET: -255 mL      EXAM:   Neurological: A/Ox3 by nodding and mouthing, no neglect, comprehension intact  CN: PERRL 3mm, EOMI and no ptosis bilaterally, face symmetric  Motor: Power 5/5 in bilateral upper and 5/5 in bilateral lower extremities      Mode: standby  acetaminophen     Tablet .. 650 milliGRAM(s) Oral every 6 hours PRN  acetylcysteine 20%  Inhalation 4 milliLiter(s) Inhalation every 6 hours PRN  albuterol/ipratropium for Nebulization 3 milliLiter(s) Nebulizer every 6 hours PRN  aMIOdarone    Tablet 400 milliGRAM(s) Oral two times a day  atorvastatin 20 milliGRAM(s) Oral at bedtime  cefepime   IVPB 2000 milliGRAM(s) IV Intermittent every 8 hours  chlorhexidine 0.12% Liquid 15 milliLiter(s) Oral Mucosa every 12 hours  chlorhexidine 2% Cloths 1 Application(s) Topical <User Schedule>  enoxaparin Injectable 70 milliGRAM(s) SubCutaneous every 12 hours  escitalopram 5 milliGRAM(s) Oral daily  fluticasone propionate 50 MICROgram(s)/spray Nasal Spray 1 Spray(s) Both Nostrils two times a day  insulin glargine Injectable (LANTUS) 10 Unit(s) SubCutaneous at bedtime  insulin regular  human corrective regimen sliding scale   SubCutaneous every 6 hours  insulin regular  human recombinant 5 Unit(s) SubCutaneous every 6 hours  iron sucrose IVPB 300 milliGRAM(s) IV Intermittent every 24 hours  levETIRAcetam 750 milliGRAM(s) Oral two times a day  metoprolol tartrate 25 milliGRAM(s) Oral every 12 hours  ondansetron Injectable 4 milliGRAM(s) IV Push every 8 hours PRN  sodium chloride 0.9% lock flush 10 milliLiter(s) IV Push every 1 hour PRN  sodium chloride 3%  Inhalation 4 milliLiter(s) Inhalation every 6 hours PRN  sucralfate 1 Gram(s) Oral every 12 hours    LABS:                         9.5    7.16  )-----------( 122      ( 14 Mar 2023 05:25 )             28.0     03-14    131<L>  |  97  |  18  ----------------------------<  177<H>  4.1   |  26  |  0.60    Ca    8.4      14 Mar 2023 05:25  Phos  2.6     03-14  Mg     1.9     03-14    TPro  5.4<L>  /  Alb  2.6<L>  /  TBili  0.9  /  DBili  x   /  AST  29  /  ALT  41  /  AlkPhos  72  03-13    LIVER FUNCTIONS - ( 13 Mar 2023 05:30 )  Alb: 2.6 g/dL / Pro: 5.4 g/dL / ALK PHOS: 72 U/L / ALT: 41 U/L / AST: 29 U/L / GGT: x           ABG - ( 13 Mar 2023 05:50 )  pH, Arterial: 7.52  pH, Blood: x     /  pCO2: 35    /  pO2: 163   / HCO3: 29    / Base Excess: 5.7   /  SaO2: 98.5        ASSESSMENT:  GBM, s/p IA avastin; complicated by cardiogenic shock, from stress induced cardiomyopathy, pulm edema.       PLAN:   Neuro: Neurochecks Q4 hr   Keppra 750 mg Q12 hr   CV: MAP >65. Continue metoprolol 25 mg bid and amiodarone per cardiology, currently in sinus rhythm.  TTE EF 20%. Diuresce prn.  PULM: Trached- on trach collar. Continue to wean. RTC nebs. Pulm following  GI: On TFs, at goal. LBM- 3/13.   Renal: Monitor urine output. Monitor BMPs.  ID: VAP- pseudomonas and staph; on cefepime.   Endo: DM, target sugar 140-180. Lantus 10u, regular 6 u  Heme: R IM calf DVT. On lovenox 40 mg sc qhs. Consider full dose lovenox if no contraindications.   Thrombocytopenia- improving, heme on consult; possibly related to avastin.     35 critical care time as patient is at risk for brain herniation, seizures, ICH  NSCU ATTENDING -- ADDITIONAL PROGRESS NOTE    Nighttime rounds were performed -- please refer to earlier Progress Note for HPI details.    Daytime events: Diuresced    ICU Vital Signs Last 24 Hrs  T(C): 36.3 (14 Mar 2023 17:50), Max: 37.6 (14 Mar 2023 08:58)  T(F): 97.3 (14 Mar 2023 17:50), Max: 99.7 (14 Mar 2023 08:58)  HR: 78 (14 Mar 2023 18:00) (78 - 109)  BP: 99/63 (14 Mar 2023 18:00) (99/63 - 120/79)  BP(mean): 77 (14 Mar 2023 18:00) (77 - 93)  ABP: 92/52 (14 Mar 2023 08:00) (92/52 - 125/53)  ABP(mean): 67 (14 Mar 2023 08:00) (67 - 117)  RR: 17 (14 Mar 2023 18:00) (16 - 18)  SpO2: 98% (14 Mar 2023 18:00) (91% - 100%)      03-13-23 @ 07:01  -  03-14-23 @ 07:00  --------------------------------------------------------  IN: 2205 mL / OUT: 900 mL / NET: 1305 mL    03-14-23 @ 07:01  -  03-14-23 @ 18:15  --------------------------------------------------------  IN: 395 mL / OUT: 650 mL / NET: -255 mL      EXAM:   Neurological: A/Ox3 by nodding and mouthing, no neglect, comprehension intact  CN: PERRL 3mm, EOMI and no ptosis bilaterally, face symmetric  Motor: Power 5/5 in bilateral upper and 5/5 in bilateral lower extremities      Mode: standby  acetaminophen     Tablet .. 650 milliGRAM(s) Oral every 6 hours PRN  acetylcysteine 20%  Inhalation 4 milliLiter(s) Inhalation every 6 hours PRN  albuterol/ipratropium for Nebulization 3 milliLiter(s) Nebulizer every 6 hours PRN  aMIOdarone    Tablet 400 milliGRAM(s) Oral two times a day  atorvastatin 20 milliGRAM(s) Oral at bedtime  cefepime   IVPB 2000 milliGRAM(s) IV Intermittent every 8 hours  chlorhexidine 0.12% Liquid 15 milliLiter(s) Oral Mucosa every 12 hours  chlorhexidine 2% Cloths 1 Application(s) Topical <User Schedule>  enoxaparin Injectable 70 milliGRAM(s) SubCutaneous every 12 hours  escitalopram 5 milliGRAM(s) Oral daily  fluticasone propionate 50 MICROgram(s)/spray Nasal Spray 1 Spray(s) Both Nostrils two times a day  insulin glargine Injectable (LANTUS) 10 Unit(s) SubCutaneous at bedtime  insulin regular  human corrective regimen sliding scale   SubCutaneous every 6 hours  insulin regular  human recombinant 5 Unit(s) SubCutaneous every 6 hours  iron sucrose IVPB 300 milliGRAM(s) IV Intermittent every 24 hours  levETIRAcetam 750 milliGRAM(s) Oral two times a day  metoprolol tartrate 25 milliGRAM(s) Oral every 12 hours  ondansetron Injectable 4 milliGRAM(s) IV Push every 8 hours PRN  sodium chloride 0.9% lock flush 10 milliLiter(s) IV Push every 1 hour PRN  sodium chloride 3%  Inhalation 4 milliLiter(s) Inhalation every 6 hours PRN  sucralfate 1 Gram(s) Oral every 12 hours    LABS:                         9.5    7.16  )-----------( 122      ( 14 Mar 2023 05:25 )             28.0     03-14    131<L>  |  97  |  18  ----------------------------<  177<H>  4.1   |  26  |  0.60    Ca    8.4      14 Mar 2023 05:25  Phos  2.6     03-14  Mg     1.9     03-14    TPro  5.4<L>  /  Alb  2.6<L>  /  TBili  0.9  /  DBili  x   /  AST  29  /  ALT  41  /  AlkPhos  72  03-13    LIVER FUNCTIONS - ( 13 Mar 2023 05:30 )  Alb: 2.6 g/dL / Pro: 5.4 g/dL / ALK PHOS: 72 U/L / ALT: 41 U/L / AST: 29 U/L / GGT: x           ABG - ( 13 Mar 2023 05:50 )  pH, Arterial: 7.52  pH, Blood: x     /  pCO2: 35    /  pO2: 163   / HCO3: 29    / Base Excess: 5.7   /  SaO2: 98.5        ASSESSMENT: GBM, s/p IA avastin; complicated by cardiogenic shock, from stress induced cardiomyopathy, pulm edema.       PLAN:   Neuro: Neurochecks Q4 hr   Keppra 750mg Q12 hr   CV: MAP >65. Continue metoprolol 25 mg bid and amiodarone per cardiology, currently in sinus rhythm.  TTE EF 20%. Diuresce prn. POCUS and assess for further lasix.   PULM: Trached- on trach collar. Continue to wean. Aggressive chest PT. RTC nebs. Pulm following.   GI: On TFs, at goal. LBM- 3/14  Renal: Monitor urine output. Monitor BMPs.  ID: VAP- pseudomonas and staph; on cefepime.   Endo: DM, target sugar 140-180. Lantus 10u, regular 6 u  Heme: R IM calf DVT. On lovenox 70mg bid. Anti Xa. LE dopplers no propagation. Chronic.  Thrombocytopenia- improving, heme on consult; possibly related to avastin.     35 critical care time as patient is at risk for brain herniation, seizures, ICH

## 2023-03-14 NOTE — PROGRESS NOTE ADULT - SUBJECTIVE AND OBJECTIVE BOX
SUBJECTIVE / INTERVAL HPI: Patient was seen and examined this morning. He remains on trach collar. Tube feeds were switched from Osmolite 1.2 ji to Vital 1.5 ji this morning at 5 AM given frequent bowel movements. Per nursing, he has experienced less frequent bowel movements since last night. Vital was started at a lower rate of 20 mL/hr (6AM-9AM), then 40 mL/hr (9AM-2PM), then goal rate of 65 mL/hr (2PM-Now). These changes in tube feed rates caused her glucose to fluctuate during the day, with a drop from 193 mg/dL to 80 mg/dL this morning (as his feeding rate was only 20 mL/hr) and subsequent hyperglycemia to 217 mg/dL at 4:30 PM after his regular insulin was held at 11 AM for glucose of 80 mg/dL.     CAPILLARY BLOOD GLUCOSE & INSULIN RECEIVED  133 mg/dL (03-13 @ 06:32) ? 6 units of regular insulin.   96 mg/dL (03-13 @ 10:41) ? 6 units of regular insulin.   125 mg/dL (03-13 @ 16:46) ? 6 units of regular insulin.   141 mg/dL (03-13 @ 22:45) ? 10 units of lantus + 5 units of sliding scale.   193 mg/dL (03-14 @ 06:06) ? 5 units of regular insulin + 2 units of sliding scale.   80 mg/dL (03-14 @ 10:46) ? Ø   217 mg/dL (03-14 @ 16:32) ? 5 units of regular insulin.     PHYSICAL EXAM  Vital Signs Last 24 Hrs  T(C): 36.3 (14 Mar 2023 17:50), Max: 37.6 (14 Mar 2023 08:58)  T(F): 97.3 (14 Mar 2023 17:50), Max: 99.7 (14 Mar 2023 08:58)  HR: 78 (14 Mar 2023 18:00) (78 - 109)  BP: 99/63 (14 Mar 2023 18:00) (99/63 - 120/79)  BP(mean): 77 (14 Mar 2023 18:00) (77 - 93)  RR: 17 (14 Mar 2023 18:00) (16 - 18)  SpO2: 98% (14 Mar 2023 18:00) (91% - 100%)    Parameters below as of 14 Mar 2023 18:00  Patient On (Oxygen Delivery Method): tracheostomy collar  O2 Flow (L/min): 10  O2 Concentration (%): 50    Constitutional: Awake, alert, male, following commands, unable to speak due to trach collar.   Neck: (+) Tracheostomy.   Respiratory: Lungs clear to ausculation bilaterally.   Cardiovascular: regular rhythm, normal S1 and S2, no audible murmurs.   GI: soft, non-distended, bowel sounds increased.   Extremities: No lower extremity edema.    LABS  CBC - WBC/HGB/HTC/PLT: 7.16/9.5/28.0/122 (03-14-23)  BMP - Na/K/Cl/Bicarb/BUN/Cr/Gluc/AG/eGFR: 131/4.1/97/26/18/0.60/177/8/106 (03-14-23)  Ca - 8.4 (03-14-23)  Phos - 2.6 (03-14-23)  Mg - 1.9 (03-14-23)  LFT - Alb/Tprot/Tbili/Dbili/AlkPhos/ALT/AST: 2.6/--/0.9/--/72/41/29 (03-13-23)  PT/aPTT/INR: 18.1/28.7/1.51 (03-12-23)   Thyroid Stimulating Hormone, Serum: 1.210 (03-08-23)    MEDICATIONS  MEDICATIONS  (STANDING):  aMIOdarone    Tablet 400 milliGRAM(s) Oral two times a day  atorvastatin 20 milliGRAM(s) Oral at bedtime  cefepime   IVPB 2000 milliGRAM(s) IV Intermittent every 8 hours  chlorhexidine 0.12% Liquid 15 milliLiter(s) Oral Mucosa every 12 hours  chlorhexidine 2% Cloths 1 Application(s) Topical <User Schedule>  enoxaparin Injectable 70 milliGRAM(s) SubCutaneous every 12 hours  escitalopram 5 milliGRAM(s) Oral daily  fluticasone propionate 50 MICROgram(s)/spray Nasal Spray 1 Spray(s) Both Nostrils two times a day  insulin glargine Injectable (LANTUS) 10 Unit(s) SubCutaneous at bedtime  insulin regular  human corrective regimen sliding scale   SubCutaneous every 6 hours  insulin regular  human recombinant 5 Unit(s) SubCutaneous every 6 hours  iron sucrose IVPB 300 milliGRAM(s) IV Intermittent every 24 hours  levETIRAcetam 750 milliGRAM(s) Oral two times a day  metoprolol tartrate 25 milliGRAM(s) Oral every 12 hours  sucralfate 1 Gram(s) Oral every 12 hours    MEDICATIONS  (PRN):  acetaminophen     Tablet .. 650 milliGRAM(s) Oral every 6 hours PRN Temp greater or equal to 38C (100.4F), Mild Pain (1 - 3)  acetylcysteine 20%  Inhalation 4 milliLiter(s) Inhalation every 6 hours PRN secretions  albuterol/ipratropium for Nebulization 3 milliLiter(s) Nebulizer every 6 hours PRN Shortness of Breath and/or Wheezing  ondansetron Injectable 4 milliGRAM(s) IV Push every 8 hours PRN Nausea and/or Vomiting  sodium chloride 0.9% lock flush 10 milliLiter(s) IV Push every 1 hour PRN Pre/post blood products, medications, blood draw, and to maintain line patency  sodium chloride 3%  Inhalation 4 milliLiter(s) Inhalation every 6 hours PRN secretions    ASSESSMENT / RECOMMENDATIONS  Mr. Norwood is a 68-year-old male with a past medical history of type 2 diabetes mellitus, hyperlipidemia, iron deficiency anemia, tracheal stenosis (s/p tracheostomy) and glioblastoma (found to have a mass in 1/2022, s/p resection of left frontal enhancing tumor with GLEOLAN placed on 1/27/22 with pathology showing gliosarcoma, WHO grade IV s/p radiation and chemotherapy completed on 12/2022, repeat MRI showing recurrence of brain mass on 12/2022) who presented for further management, now s/p cerebral angiogram with Avastin treatment (3/7/23). Post-operative course was complicated by flash pulmonary edema, desaturation, new global aphasia, right sided weakness, lactic acidosis and hyperglycemia. Endocrinology following for recommendations regarding glycemic control.     A1C: 5.8 %  BUN: 18  Creatinine: 0.60  GFR: 106  Weight: 71.214  BMI: 24.6    # Type 2 diabetes mellitus  - Blood glucose levels trending up slightly yesterday (before changes in tube feed) despite receiving 5 units of regular insulin.   - Please continue with lantus 10 units at bedtime.   - Increase regular insulin to 6 units every 6 hours while on tube feeds.   - Continue regular insulin moderate dose sliding scale every 6 hours.  - Patient's fingerstick glucose goal is 100-180 mg/dL.      Case discussed with Dr. Hoang. Primary team updated.       Fabrice Madrid    Endocrinology Fellow    Service Pager: [phone]    SUBJECTIVE / INTERVAL HPI: Patient was seen and examined this morning. He remains on trach collar. Tube feeds were switched from Osmolite 1.2 ji to Vital 1.5 ji this morning at 5 AM given frequent bowel movements. Per nursing, he has experienced less frequent bowel movements since last night. Vital was started at a lower rate of 20 mL/hr (6AM-9AM), then 40 mL/hr (9AM-2PM), then goal rate of 65 mL/hr (2PM-Now). These changes in tube feed rates caused his glucose to fluctuate during the day, with a drop from 193 mg/dL to 80 mg/dL this morning (as his feeding rate was only 20 mL/hr) and subsequent hyperglycemia to 217 mg/dL at 4:30 PM after his regular insulin was held at 11 AM for glucose of 80 mg/dL.     CAPILLARY BLOOD GLUCOSE & INSULIN RECEIVED  133 mg/dL (03-13 @ 06:32) ? 6 units of regular insulin.   96 mg/dL (03-13 @ 10:41) ? 6 units of regular insulin.   125 mg/dL (03-13 @ 16:46) ? 6 units of regular insulin.   141 mg/dL (03-13 @ 22:45) ? 10 units of lantus + 5 units of sliding scale.   193 mg/dL (03-14 @ 06:06) ? 5 units of regular insulin + 2 units of sliding scale.   80 mg/dL (03-14 @ 10:46) ? Ø   217 mg/dL (03-14 @ 16:32) ? 5 units of regular insulin.     PHYSICAL EXAM  Vital Signs Last 24 Hrs  T(C): 36.3 (14 Mar 2023 17:50), Max: 37.6 (14 Mar 2023 08:58)  T(F): 97.3 (14 Mar 2023 17:50), Max: 99.7 (14 Mar 2023 08:58)  HR: 78 (14 Mar 2023 18:00) (78 - 109)  BP: 99/63 (14 Mar 2023 18:00) (99/63 - 120/79)  BP(mean): 77 (14 Mar 2023 18:00) (77 - 93)  RR: 17 (14 Mar 2023 18:00) (16 - 18)  SpO2: 98% (14 Mar 2023 18:00) (91% - 100%)    Parameters below as of 14 Mar 2023 18:00  Patient On (Oxygen Delivery Method): tracheostomy collar  O2 Flow (L/min): 10  O2 Concentration (%): 50    Constitutional: Awake, alert, male, following commands, unable to speak due to trach collar.   Neck: (+) Tracheostomy.   Respiratory: Lungs clear to ausculation bilaterally.   Cardiovascular: regular rhythm, normal S1 and S2, no audible murmurs.   GI: soft, non-distended, bowel sounds increased.   Extremities: No lower extremity edema.    LABS  CBC - WBC/HGB/HTC/PLT: 7.16/9.5/28.0/122 (03-14-23)  BMP - Na/K/Cl/Bicarb/BUN/Cr/Gluc/AG/eGFR: 131/4.1/97/26/18/0.60/177/8/106 (03-14-23)  Ca - 8.4 (03-14-23)  Phos - 2.6 (03-14-23)  Mg - 1.9 (03-14-23)  LFT - Alb/Tprot/Tbili/Dbili/AlkPhos/ALT/AST: 2.6/--/0.9/--/72/41/29 (03-13-23)  PT/aPTT/INR: 18.1/28.7/1.51 (03-12-23)   Thyroid Stimulating Hormone, Serum: 1.210 (03-08-23)    MEDICATIONS  MEDICATIONS  (STANDING):  aMIOdarone    Tablet 400 milliGRAM(s) Oral two times a day  atorvastatin 20 milliGRAM(s) Oral at bedtime  cefepime   IVPB 2000 milliGRAM(s) IV Intermittent every 8 hours  chlorhexidine 0.12% Liquid 15 milliLiter(s) Oral Mucosa every 12 hours  chlorhexidine 2% Cloths 1 Application(s) Topical <User Schedule>  enoxaparin Injectable 70 milliGRAM(s) SubCutaneous every 12 hours  escitalopram 5 milliGRAM(s) Oral daily  fluticasone propionate 50 MICROgram(s)/spray Nasal Spray 1 Spray(s) Both Nostrils two times a day  insulin glargine Injectable (LANTUS) 10 Unit(s) SubCutaneous at bedtime  insulin regular  human corrective regimen sliding scale   SubCutaneous every 6 hours  insulin regular  human recombinant 5 Unit(s) SubCutaneous every 6 hours  iron sucrose IVPB 300 milliGRAM(s) IV Intermittent every 24 hours  levETIRAcetam 750 milliGRAM(s) Oral two times a day  metoprolol tartrate 25 milliGRAM(s) Oral every 12 hours  sucralfate 1 Gram(s) Oral every 12 hours    MEDICATIONS  (PRN):  acetaminophen     Tablet .. 650 milliGRAM(s) Oral every 6 hours PRN Temp greater or equal to 38C (100.4F), Mild Pain (1 - 3)  acetylcysteine 20%  Inhalation 4 milliLiter(s) Inhalation every 6 hours PRN secretions  albuterol/ipratropium for Nebulization 3 milliLiter(s) Nebulizer every 6 hours PRN Shortness of Breath and/or Wheezing  ondansetron Injectable 4 milliGRAM(s) IV Push every 8 hours PRN Nausea and/or Vomiting  sodium chloride 0.9% lock flush 10 milliLiter(s) IV Push every 1 hour PRN Pre/post blood products, medications, blood draw, and to maintain line patency  sodium chloride 3%  Inhalation 4 milliLiter(s) Inhalation every 6 hours PRN secretions    ASSESSMENT / RECOMMENDATIONS  Mr. Norwood is a 68-year-old male with a past medical history of type 2 diabetes mellitus, hyperlipidemia, iron deficiency anemia, tracheal stenosis (s/p tracheostomy) and glioblastoma (found to have a mass in 1/2022, s/p resection of left frontal enhancing tumor with GLEOLAN placed on 1/27/22 with pathology showing gliosarcoma, WHO grade IV s/p radiation and chemotherapy completed on 12/2022, repeat MRI showing recurrence of brain mass on 12/2022) who presented for further management, now s/p cerebral angiogram with Avastin treatment (3/7/23). Post-operative course was complicated by flash pulmonary edema, desaturation, new global aphasia, right sided weakness, lactic acidosis and hyperglycemia. Endocrinology following for recommendations regarding glycemic control.     A1C: 5.8 %  BUN: 18  Creatinine: 0.60  GFR: 106  Weight: 71.214  BMI: 24.6    # Type 2 diabetes mellitus  - Blood glucose levels trending up slightly yesterday (before changes in tube feed) despite receiving 5 units of regular insulin.   - Please continue with lantus 10 units at bedtime.   - Increase regular insulin to 6 units every 6 hours while on tube feeds.   - Continue regular insulin moderate dose sliding scale every 6 hours.  - Patient's fingerstick glucose goal is 100-180 mg/dL.      Case discussed with Dr. Hoang. Primary team updated.       Fabrice Madrid    Endocrinology Fellow    Service Pager: [phone]

## 2023-03-14 NOTE — PROGRESS NOTE ADULT - SUBJECTIVE AND OBJECTIVE BOX
Cardiology Consult Service Progress Note       Patient is a 68y old  Male who presents with a chief complaint of cerebral angiogram with Avastin (14 Mar 2023 14:06)      SUBJECTIVE/OVERNIGHT EVENTS   As per primary team, patient pulled out central line yesterday. Noted to intermittently desaturate on trach collar.     OBJECTIVE  Vital Signs Last 24 Hrs  T(C): 37.6 (14 Mar 2023 13:59), Max: 37.6 (14 Mar 2023 08:58)  T(F): 99.6 (14 Mar 2023 13:59), Max: 99.7 (14 Mar 2023 08:58)  HR: 82 (14 Mar 2023 11:00) (78 - 109)  BP: 118/74 (14 Mar 2023 11:00) (96/62 - 120/79)  BP(mean): 91 (14 Mar 2023 11:00) (74 - 93)  RR: 16 (14 Mar 2023 11:00) (16 - 18)  SpO2: 97% (14 Mar 2023 11:00) (91% - 99%)    Parameters below as of 14 Mar 2023 11:00  Patient On (Oxygen Delivery Method): tracheostomy collar  O2 Flow (L/min): 10  O2 Concentration (%): 50    I&O's Summary    13 Mar 2023 07:01  -  14 Mar 2023 07:00  --------------------------------------------------------  IN: 2205 mL / OUT: 900 mL / NET: 1305 mL    14 Mar 2023 07:01  -  14 Mar 2023 14:31  --------------------------------------------------------  IN: 120 mL / OUT: 300 mL / NET: -180 mL        PHYSICAL EXAM:  GENERAL: NAD, well-developed  HEAD:  Atraumatic, Normocephalic  EYES: EOMI, conjunctiva and sclera clear  NECK: Supple, No JVD  CHEST/LUNG: Clear to auscultation bilaterally; No wheeze  HEART: Regular rate and rhythm; No murmurs, rubs, or gallops  ABDOMEN: Soft, Nontender, Nondistended; Bowel sounds present  EXTREMITIES:  2+ Peripheral Pulses, No clubbing, cyanosis, or edema  PSYCH: AAOx3  NEUROLOGY: non-focal  SKIN: No rashes or lesions    LABS                        9.5    7.16  )-----------( 122      ( 14 Mar 2023 05:25 )             28.0                         9.8    5.98  )-----------( 108      ( 13 Mar 2023 05:30 )             28.1     03-14    131<L>  |  97  |  18  ----------------------------<  177<H>  4.1   |  26  |  0.60  03-13    132<L>  |  96  |  18  ----------------------------<  184<H>  3.6   |  26  |  0.59    Ca    8.4      14 Mar 2023 05:25  Ca    7.8<L>      13 Mar 2023 05:30  Phos  2.6     03-14  Mg     1.9     03-14    TPro  5.4<L>  /  Alb  2.6<L>  /  TBili  0.9  /  DBili  x   /  AST  29  /  ALT  41  /  AlkPhos  72  03-13    CAPILLARY BLOOD GLUCOSE      POCT Blood Glucose.: 80 mg/dL (14 Mar 2023 10:46)  POCT Blood Glucose.: 193 mg/dL (14 Mar 2023 06:06)  POCT Blood Glucose.: 141 mg/dL (13 Mar 2023 22:45)  POCT Blood Glucose.: 125 mg/dL (13 Mar 2023 16:46)       12 Mar 2023 04:28 Troponin 0.02 ng/mL / CK x     / CKMB x     / CKMB Units x       11 Mar 2023 17:21 Troponin 0.03 ng/mL / CK 94 U/L / CKMB x     / CKMB Units <1.0 ng/mL   08 Mar 2023 23:55 Troponin 0.13 ng/mL / CK x     / CKMB x     / CKMB Units x            ( 13 Mar 2023 05:50 ) pH: 7.52  /  pCO2: 35    /  pO2: 163   / HCO3: 29    / Base Excess: 5.7   /  SaO2: 98.5            ( 12 Mar 2023 04:24 ) pH: 7.48  /  pCO2: 36    /  pO2: 138   / HCO3: 27    / Base Excess: 3.4   /  SaO2: 99.0            ( 11 Mar 2023 08:13 ) pH: 7.47  /  pCO2: 38    /  pO2: 98    / HCO3: 28    / Base Excess: 3.9   /  SaO2: 98.2                      RADIOLOGY & ADDITIONAL TESTS:    MEDICATIONS  (STANDING):  aMIOdarone    Tablet 400 milliGRAM(s) Oral two times a day  atorvastatin 20 milliGRAM(s) Oral at bedtime  cefepime   IVPB 2000 milliGRAM(s) IV Intermittent every 8 hours  chlorhexidine 0.12% Liquid 15 milliLiter(s) Oral Mucosa every 12 hours  chlorhexidine 2% Cloths 1 Application(s) Topical <User Schedule>  enoxaparin Injectable 70 milliGRAM(s) SubCutaneous every 12 hours  fluticasone propionate 50 MICROgram(s)/spray Nasal Spray 1 Spray(s) Both Nostrils two times a day  insulin glargine Injectable (LANTUS) 10 Unit(s) SubCutaneous at bedtime  insulin regular  human corrective regimen sliding scale   SubCutaneous every 6 hours  insulin regular  human recombinant 5 Unit(s) SubCutaneous every 6 hours  iron sucrose IVPB 300 milliGRAM(s) IV Intermittent every 24 hours  levETIRAcetam 750 milliGRAM(s) Oral two times a day  metoprolol tartrate 25 milliGRAM(s) Oral every 12 hours  sucralfate 1 Gram(s) Oral every 12 hours    MEDICATIONS  (PRN):  acetaminophen     Tablet .. 650 milliGRAM(s) Oral every 6 hours PRN Temp greater or equal to 38C (100.4F), Mild Pain (1 - 3)  acetylcysteine 20%  Inhalation 4 milliLiter(s) Inhalation every 6 hours PRN secretions  albuterol/ipratropium for Nebulization 3 milliLiter(s) Nebulizer every 6 hours PRN Shortness of Breath and/or Wheezing  ondansetron Injectable 4 milliGRAM(s) IV Push every 8 hours PRN Nausea and/or Vomiting  sodium chloride 0.9% lock flush 10 milliLiter(s) IV Push every 1 hour PRN Pre/post blood products, medications, blood draw, and to maintain line patency  sodium chloride 3%  Inhalation 4 milliLiter(s) Inhalation every 6 hours PRN secretions

## 2023-03-14 NOTE — PROGRESS NOTE ADULT - ATTENDING COMMENTS
Initial attending contact date  3/8/23    . See fellow note written above for details. I reviewed the fellow documentation. I have personally seen and examined this patient. I reviewed vitals, labs, medications, cardiac studies, and additional imaging. I agree with the above fellow's findings and plans as written above with the following additions/statements.    69 y/o right handed M with PMH of T2DM, HLD, JAGDISH, hyponatremia, tracheal stenosis s/p tracheostomy, and glioblastoma s/p resection 1/2022 s/p radiation and chemo, admitted for cerebral angio with avastin tx due to recurrence of brain mass. Post op with HTN emergency leading to pulm edema  -Cardiology consulted for low EF noted on POCUS  -ECHO reviewed: severely depressed LVEF ~ 15-20% with akinetic/apical wall and basal inferior wall hyperkinesis. Mod TR with mild pulm HTN  -EKG post op NSR with new septal Qs and 1mm DAVID  -trop .32 now downtrending, BNP 6000  -Clinical picture along with EKG changes, mild trop elevation and ECHO findings all consistent with takotsubo CM  -Since takotsubo CM is diagnosis of exclusion , will need to r/o CAD if EF does not improve once clinical status improves  -Plan to repeat echo later this week  -Clinically with sig improvement, alert awake following commands  -Events 3/12 noted with AVNRT in setting of febrile state,  cont metoprolol 25 q 12 and amio 400 mg po bid for now. Will plan to dc amio and AC if no recurrent events on tele  - K > 4.2, Mag > 2   -Lasix 40 mg IV x1 today

## 2023-03-14 NOTE — PROGRESS NOTE ADULT - ASSESSMENT
68M PMH T2DM, HLD, JAGDISH, hyponatremia, tracheal stenosis s/p tracheostomy, and glioblastoma s/p resection of the left frontal enhancing tumor admitted for cerebral angiogram with Avastin treatment in the setting of a recurrent mass c/b stress cardiomyopathy 2/2 excess norepinephrine.    Review of studies:  ECG: NSR, 1st degree AVB, 0.5mm- 1mm DAVID v1-v3, IVCD  TTE 3/2021: Normal Bi-V function  TTE with severely reduced LV systolic function EF 15-20%, apex and mid segments are akinetic  TTE 3/8/23: LVEF 15-20% . The entire apex and all the mid segments are akinetic, with relative sparing of the basal segments. In the absence of ischemic heart disease, this is suggestive of Takotsubo's cardiomyopathy. Clinical correlation is advised. With echocontrast imaging, there is no evidence of left ventricular thrombus. Normal right ventricular size and systolic function. Normal atria. Moderate tricuspid regurgitation. PASP 40 mmHg. No pericardial effusion. Mildly dilated aortic root.   TTE 3/13/23: EF is still reduced at 20%    #Stress induced cardiomyopathy (Takotsubo cardiomyopathy)   Likely due to excess norepinephrine given relative good functional capacity, and normal TTE prior to the procedure.  Troponin peaked 0.32, no need to trend further. Off vasopressor support since 3/10. Briefly placed on neosynephrine during SVT episode, otherwise hemodynamically stable once in sinus rhythm.  -EF is still 20% - possibly still Takotsubo as this can take weeks to resolve.   - If EF does not improve, will need ischemic work-up when stable  - Would give another dose of lasix 40mg IV now given intermittent desaturations on trach collar.     #SVT/Atrial fibrillation  Telemetry 3/11/23: one episode of atrial fibrillation and SVT to 180s in the late morning - subsequent sinus rhythm s/p IV lopressor.  - Lopressor 25mg BID  - s/p IV amiodarone load. Continue with Amiodarone 400mg BID to complete load and then transition to amiodarone 200mg maintenance dose.   - SVT can be seen in the setting of sepsis (patient is febrile and found to have a consolidation on CXR. Being treated for pseudomonal pneumonia.). After resolution of infection, rate control requirements may decrease.

## 2023-03-14 NOTE — PROGRESS NOTE ADULT - SUBJECTIVE AND OBJECTIVE BOX
Interval Events: Reviewed  Patient seen and examined at bedside.    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      Type 2 diabetes mellitus      Hyperlipidemia      Iron deficiency anemia      Nephrolithiasis      Thrombocytopenia      Hyponatremia      BPH (benign prostatic hyperplasia)      S/P craniotomy      H/O tracheostomy          MEDICATIONS:  Pulmonary:  acetylcysteine 20%  Inhalation 4 milliLiter(s) Inhalation every 6 hours PRN  albuterol/ipratropium for Nebulization 3 milliLiter(s) Nebulizer every 6 hours PRN  sodium chloride 3%  Inhalation 4 milliLiter(s) Inhalation every 6 hours PRN    Antimicrobials:  cefepime   IVPB 2000 milliGRAM(s) IV Intermittent every 8 hours    Anticoagulants:  enoxaparin Injectable 70 milliGRAM(s) SubCutaneous every 12 hours    Cardiac:  aMIOdarone    Tablet 400 milliGRAM(s) Oral two times a day  metoprolol tartrate 25 milliGRAM(s) Oral every 12 hours      Allergies    amoxicillin (Rash)    Intolerances        Vital Signs Last 24 Hrs  T(C): 36.3 (14 Mar 2023 17:50), Max: 37.6 (14 Mar 2023 08:58)  T(F): 97.3 (14 Mar 2023 17:50), Max: 99.7 (14 Mar 2023 08:58)  HR: 78 (14 Mar 2023 18:00) (78 - 109)  BP: 99/63 (14 Mar 2023 18:00) (99/63 - 120/79)  BP(mean): 77 (14 Mar 2023 18:00) (77 - 93)  RR: 17 (14 Mar 2023 18:00) (16 - 18)  SpO2: 98% (14 Mar 2023 18:00) (91% - 100%)    Parameters below as of 14 Mar 2023 18:00  Patient On (Oxygen Delivery Method): tracheostomy collar  O2 Flow (L/min): 10  O2 Concentration (%): 50    03-13 @ 07:01  -  03-14 @ 07:00  --------------------------------------------------------  IN: 2205 mL / OUT: 900 mL / NET: 1305 mL    03-14 @ 07:01 - 03-14 @ 20:48  --------------------------------------------------------  IN: 395 mL / OUT: 650 mL / NET: -255 mL      Mode: standby      Review of Systems:   •	General: negative  •	Skin/Breast: negative  •	Ophthalmologic: negative  •	ENMT: negative  •	Respiratory and Thorax: negative  •	Cardiovascular: negative  •	Gastrointestinal: negative  •	Genitourinary: negative  •	Musculoskeletal: negative  •	Neurological: negative  •	Psychiatric: negative  •	Hematology/Lymphatics: negative  •	Endocrine: negative  •	Allergic/Immunologic: negative    Physical Exam:   • Constitutional:	refer to the dietion /Nutritionist note  • Eyes:	EOMI; PERRL; no drainage or redness  • ENMT:	No oral lesions; no gross abnormalities  • Neck	No bruits; no thyromegaly or nodules  • Breasts:	not examined  • Back:	No deformity or limitation of movement  • Respiratory:	Breath Sounds equal & clear to percussion & auscultation, no accessory muscle use  • Cardiovascular:	Regular rate & rhythm, normal S1, S2; no murmurs, gallops or rubs; no S3, S4  • Gastrointestinal:	Soft, non-tender, no hepatosplenomegaly, normal bowel sounds  • Genitourinary:	not examined  • Rectal: not examined  • Extremities:	No cyanosis, clubbing or edema  • Vascular:	Equal and normal pulses (carotid, femoral, dorsalis pedis)  • Neurologica:l	not examined  • Skin:	No lesions; no rash  • Lymph Nodes:	No lymphadedenopathy  • Musculoskeletal:	No joint pain, swelling or deformity; no limitation of movement        LABS:  ABG - ( 13 Mar 2023 05:50 )  pH, Arterial: 7.52  pH, Blood: x     /  pCO2: 35    /  pO2: 163   / HCO3: 29    / Base Excess: 5.7   /  SaO2: 98.5                CBC Full  -  ( 14 Mar 2023 05:25 )  WBC Count : 7.16 K/uL  RBC Count : 2.89 M/uL  Hemoglobin : 9.5 g/dL  Hematocrit : 28.0 %  Platelet Count - Automated : 122 K/uL  Mean Cell Volume : 96.9 fl  Mean Cell Hemoglobin : 32.9 pg  Mean Cell Hemoglobin Concentration : 33.9 gm/dL  Auto Neutrophil # : x  Auto Lymphocyte # : x  Auto Monocyte # : x  Auto Eosinophil # : x  Auto Basophil # : x  Auto Neutrophil % : x  Auto Lymphocyte % : x  Auto Monocyte % : x  Auto Eosinophil % : x  Auto Basophil % : x    03-14    131<L>  |  97  |  18  ----------------------------<  177<H>  4.1   |  26  |  0.60    Ca    8.4      14 Mar 2023 05:25  Phos  2.6     03-14  Mg     1.9     03-14    TPro  5.4<L>  /  Alb  2.6<L>  /  TBili  0.9  /  DBili  x   /  AST  29  /  ALT  41  /  AlkPhos  72  03-13      < from: TTE Echo Limited or F/U (03.13.23 @ 09:49) >  CONCLUSIONS:     1. Limited study obtained for evaluation of left ventricular function.   2. Normal right ventricular size and systolic function.   3. There is severe left ventricular systolic dysfunction with akinesis   of mid and apical segments and relative sparing of the basal segments. No   obvious LV thrombus seen. EF is    20% In the absence of significant CAD, this is most consistent with a a   takutsubo cardiomyopathy. Correlate clinically.   4. No evidence of LVOT obstruction.   5. At most moderate mitral regurgitation.   6. Small pericardial effusion without echocardiographic evidence of   cardiac tamponade physiology.   7. Compared to the previous TTE performed on 3/8/2023, the LV function   appears similar. More MR is seen on today's study.    < end of copied text >                    RADIOLOGY & ADDITIONAL STUDIES (The following images were personally reviewed):

## 2023-03-14 NOTE — PROGRESS NOTE ADULT - SUBJECTIVE AND OBJECTIVE BOX
INTERVAL HPI/OVERNIGHT EVENTS:    SUBJECTIVE: Patient seen and examined at bedside with daughter and son-in-law present. He has no new complaints. Denies any bleeding or bruising. Denies any headache, dizziness, chest pain, sob, leg pain or swelling.       Vital Signs Last 24 Hrs  T(C): 37.6 (14 Mar 2023 13:59), Max: 37.6 (14 Mar 2023 08:58)  T(F): 99.6 (14 Mar 2023 13:59), Max: 99.7 (14 Mar 2023 08:58)  HR: 82 (14 Mar 2023 11:00) (78 - 109)  BP: 118/74 (14 Mar 2023 11:00) (96/62 - 120/79)  BP(mean): 91 (14 Mar 2023 11:00) (74 - 93)  RR: 16 (14 Mar 2023 11:00) (16 - 18)  SpO2: 97% (14 Mar 2023 11:00) (91% - 99%)    Parameters below as of 14 Mar 2023 11:00  Patient On (Oxygen Delivery Method): tracheostomy collar  O2 Flow (L/min): 10  O2 Concentration (%): 50    PHYSICAL EXAM:    Constitutional: NAD  HEENT: NC/AT; anicteric sclera; MMM  Neck: supple, no JVD, +trach  Cardiovascular: +S1/S2, RRR  Respiratory: CTA B/L, no W/R/R  Gastrointestinal: abdomen soft, NT/ND; +BSx4  Extremities: WWP; no LE edema; no clubbing or cyanosis  Dermatologic: normal color and turgor; no visible rashes  Neurological: nonfocal      MEDICATIONS  (STANDING):  aMIOdarone    Tablet 400 milliGRAM(s) Oral two times a day  atorvastatin 20 milliGRAM(s) Oral at bedtime  cefepime   IVPB 2000 milliGRAM(s) IV Intermittent every 8 hours  chlorhexidine 0.12% Liquid 15 milliLiter(s) Oral Mucosa every 12 hours  chlorhexidine 2% Cloths 1 Application(s) Topical <User Schedule>  enoxaparin Injectable 70 milliGRAM(s) SubCutaneous every 12 hours  fluticasone propionate 50 MICROgram(s)/spray Nasal Spray 1 Spray(s) Both Nostrils two times a day  insulin glargine Injectable (LANTUS) 10 Unit(s) SubCutaneous at bedtime  insulin regular  human corrective regimen sliding scale   SubCutaneous every 6 hours  insulin regular  human recombinant 5 Unit(s) SubCutaneous every 6 hours  iron sucrose IVPB 300 milliGRAM(s) IV Intermittent every 24 hours  levETIRAcetam 750 milliGRAM(s) Oral two times a day  metoprolol tartrate 25 milliGRAM(s) Oral every 12 hours  sucralfate 1 Gram(s) Oral every 12 hours    MEDICATIONS  (PRN):  acetaminophen     Tablet .. 650 milliGRAM(s) Oral every 6 hours PRN Temp greater or equal to 38C (100.4F), Mild Pain (1 - 3)  acetylcysteine 20%  Inhalation 4 milliLiter(s) Inhalation every 6 hours PRN secretions  albuterol/ipratropium for Nebulization 3 milliLiter(s) Nebulizer every 6 hours PRN Shortness of Breath and/or Wheezing  ondansetron Injectable 4 milliGRAM(s) IV Push every 8 hours PRN Nausea and/or Vomiting  sodium chloride 0.9% lock flush 10 milliLiter(s) IV Push every 1 hour PRN Pre/post blood products, medications, blood draw, and to maintain line patency  sodium chloride 3%  Inhalation 4 milliLiter(s) Inhalation every 6 hours PRN secretions    ALLERGIES:  Allergies    amoxicillin (Rash)    Intolerances        LABS:                          9.5    7.16  )-----------( 122      ( 14 Mar 2023 05:25 )             28.0       03-14    131<L>  |  97  |  18  ----------------------------<  177<H>  4.1   |  26  |  0.60    Ca    8.4      14 Mar 2023 05:25  Phos  2.6     03-14  Mg     1.9     03-14    TPro  5.4<L>  /  Alb  2.6<L>  /  TBili  0.9  /  DBili  x   /  AST  29  /  ALT  41  /  AlkPhos  72  03-13      RADIOLOGY & ADDITIONAL TESTS: Reviewed.

## 2023-03-14 NOTE — PROGRESS NOTE ADULT - ASSESSMENT
68M with pmhx of T2DM, HLD, JAGDISH, hyponatremia, tracheal stenosis s/p tracheostomy, and glioblastoma now s/p cerebral angiogram with Avastin treatment. Hematology consulted for thrombocytopenia.    Grade II Thrombocytopenia, Plt 63k-->79K -->122(today)  Pt has had a history of thrombocytopenia with his baseline range  since the latter half of last year.   He was previously on temozolomide and RT (completed treatment in 12/2022), which coincides with his noted thrombocytopenia. He received Avastin on 3/7, and has a new flores of 63k today.  Peripheral slide reviewed - rare schistocytes 0-1/hpf,   Plasmic score 3 pts, low risk of TTP  4Ts score 2 pts, low probability of HIT  No other new medications were started that could cause DITP.  No clinical signs of DIC on exam, however, will need labs to support this. Labs reviewed, DIC ruled out.  Thrombocytopenia is most likely 2/2 recent Avastin treatment, as Avastin is known to cause thrombocytopenia in up to 58% of patients (Uptodate). It is also likely related to possible underlying infection.   Transfuse 1U plt if bleeding and platelet count <50k, if febrile and platelet count <20k, or if platelet count <10k    DVT RLE IM calf  -new DVT, pt only on prophylactic dose Lovenox  -DVT needs to be treated given risk of embolization. Recommend full dose AC if there are no contraindications from neurosurgery perspective with heparin gtt as this can be easily reversed if there are signs of bleeding, or worsening thrombocytopenia.   - Hold AC if platelet count <50K. Current platelet count is not a contraindication for AC   - Patient was cleared from neurosx perspective and was started on Lovenox 1 mg/kg SQ BID. On discharge, can start oral anticoagulant with DOAC for atleast 3 months. Duration of AC may need to extended as patient as ongoing provoking factor (i.e. active malignancy).   - If unable to fully anticoagulate, recommend vascular consult for IVC filter.       Anemia  Hb 9.7, on admission 11.7, MCV 94.9  No episodes of bleeding.  -iron 13, %sat 6, ferritin 195, ok to give IV iron sucrose 300mg daily for 3 days.   -labs reviewed, no signs of hemolysis.    Discussed with Dr. Houser

## 2023-03-14 NOTE — CHART NOTE - NSCHARTNOTEFT_GEN_A_CORE
3/14: POD7. ANA LILIA o/n neuro stable. started on full dose SQL for DVT. 20mg lasix given for diuresis. LE dopplers showing chronic R IM calf DVT. Tolerating trach collar. Restarted his home lexapro for agitation.

## 2023-03-14 NOTE — PROGRESS NOTE ADULT - SUBJECTIVE AND OBJECTIVE BOX
HPI:  67 y/o right handed male with pmhx of T2DM, HLD, JAGDISH, hyponatremia, tracheal stenosis s/p tracheostomy, and glioblastoma presents for cerebral angiogram with Avastin treatment.    Daughter states in 2022, patient had onset of confusion, dizziness, and speech difficulty while in [place] and was diagnosed with brain mass. Pt evaluated upon return to the [place] at [facility] ED with CT head revealing left frontal brain mass. Pt underwent resection of left frontal enhancing tumor with GLEOLAN placed 2022. Path showed gliosarcoma, WHO grade IV, IDH wild-type, EGFR non amplified, MGMT promotor methylated. Pt was discharged to rehab and underwent radiation treatment and Temodar chemotherapy (completed ). MRI  showed recurrence of brain mass and pt presents today referred by Dr. Mackenzie for trial participation cerebral angiogram with Avastin treatment.    Pt complains of dizziness. Denies headache, nausea, vomiting, weakness, numbness, chest pain, dyspnea.   (07 Mar 2023 12:30)    OVERNIGHT EVENTS: ANA LILIA    Hospital Course:   3/7: POD0 cerebral angiogram IA avastin. post op in cath lab hypertensive, +flash pulmonary edema w/ desaturation, given diuretics. on cpap   3/8: POD1. o/n new global aphasia, right side w/d. CTH/CTA/CTP performed. ABG o/n with metabolic acidosis, lactate 7.9 and hyperglycemic 320s. started on insulin gtt, 1L NS bolus and NS@200cc/hr. desatting on cpap, placed on full vent support. propofol for sedation. in SVT, resolved with 5 lopresor. fluids stopped due to worsening CXR. repeat lactate 12, given 1amp bicarb then started on bicarb gtt. POCUS this am with hypokinetic left ventricle, pending echo. Cards/nephro/ endo consulted for further recs. Insulin gtt dc'd. VEEG monitoring. S/p 3%Na.  SQL started tonight. S/p 10 NPH after FS of 166. steven gtt. S/p 40 IV lasix. Troponin downtrending 0.32 to 0.18. EEG negative for seizures. Pancultured spiked 101.5. UA negative. Bicarb gtt dc'd. TTE EF15-20%, akinetic apex and midsegments suggestive of takotsubo  3/9: POD2. ANA LILIA o/n neuro stable. remains sedated on propofol, on levo gtt. Propofol dc'd. s/p lasix 40 mg IV x 1. CXR L effusion improved. Started lantus 10u at bedtime per endocrine. Campuzano removed, f/u TOV.    3/10: POD3 ANA LILIA overnight. Neuro exam stable. multiple bouts of diarrhea overnight and AM. Tube feeds held. s/p albumin 250 cc +  cc for tachycardia in the setting of negative fluid status. Tolerating CPAP. Tolerating trach collar x 3 hours, switched back to CPAP overnight. Off levo gtt.   3: POD4 Episode of O2 desaturation to low 90s while on 40% FiO2. FiO2 increased to 60% briefly with improvement in O2, good breath sounds throughout, Pt denies SOB and CXR stable. Neuro exam stable. CXR with worsening congestion, given Lasix 40mg IV, on full vent support. Heme consulted for thrombocytopenia. Fever 101F. Episode of SVT with HR to 180s and hypotensive to 80s. Started on Steven, EKG and IV tylenol. Cards fellow called to bedside. Lopressor 5mg IV given with good resolution. Vital signs normalized. Patient remained neuro intact. Started on maintenance lopressor 12.5bid and amio gtt per cards. Given another Lasix 40mg IV at 6pm. Added regular insulin 2 units q 6 per endo.  312: POD5 Pt remains on full vent support. Neuro exam stable. Spiked 101.7F fever, pan cultured. Started on vanco/cefepine for empiric coverage. Lasix 40mg IV given in afternoon. Started on IV iron x 3 days, per heme. Regular insulin increased to 6q6, per endo. Amio PO, increased lopressor to 25bid, per cards.  3/13: POD 6. ANA LILIA overnight, exam stable, c-diff sent for multiple loose BMs/febrile/off bowel regimen since 3/7, negative. low grade fever overnight 100.2 pan cx yesterday for fever 101.7. C. diff negative. Cefepime x 7 d for psudomonal PNA. Repeat echo done, EF 20%. Osmolite changed to Vital per endo recs for loose stools.   3/14: POD7. ANA LILIA o/n neuro stable.     Vital Signs Last 24 Hrs  T(C): 37.4 (13 Mar 2023 21:36), Max: 37.4 (13 Mar 2023 05:11)  T(F): 99.4 (13 Mar 2023 21:36), Max: 99.4 (13 Mar 2023 21:36)  HR: 92 (13 Mar 2023 21:00) (80 - 103)  BP: 105/66 (13 Mar 2023 20:00) (96/62 - 113/74)  BP(mean): 81 (13 Mar 2023 20:00) (74 - 89)  RR: 17 (13 Mar 2023 21:00) (17 - 22)  SpO2: 91% (13 Mar 2023 21:00) (91% - 100%)    Parameters below as of 13 Mar 2023 21:00  Patient On (Oxygen Delivery Method): tracheostomy collar    O2 Concentration (%): 40    I&O's Summary    12 Mar 2023 07:01  -  13 Mar 2023 07:00  --------------------------------------------------------  IN: 2447 mL / OUT: 1300 mL / NET: 1147 mL    13 Mar 2023 07:01  -  14 Mar 2023 00:04  --------------------------------------------------------  IN: 1445 mL / OUT: 450 mL / NET: 995 mL        PHYSICAL EXAM:  GEN: laying in bed, NAD  NEURO: AOx3 (nods/mouths words). FC, OE spont, face symmetric. CNII-XII intact. PERRL, EOMI. No pronator drift. MAEx4. 5/5 strength throughout. SILT  CV: RRR +S1/S2  PULM: CTAB  GI: Abd soft, NT/ND  EXT: ext warm, dry, nontender    TUBES/LINES:  [] Campuzano  [] Lumbar Drain  [] Wound Drains  [] Others      DIET:  [] NPO  [] Mechanical  [x] Tube feeds    LABS:                        9.8    5.98  )-----------( 108      ( 13 Mar 2023 05:30 )             28.1     03-13    132<L>  |  96  |  18  ----------------------------<  184<H>  3.6   |  26  |  0.59    Ca    7.8<L>      13 Mar 2023 05:30  Phos  2.1     03-13  Mg     1.9     -13    TPro  5.4<L>  /  Alb  2.6<L>  /  TBili  0.9  /  DBili  x   /  AST  29  /  ALT  41  /  AlkPhos  72  03-13    PT/INR - ( 12 Mar 2023 04:28 )   PT: 18.1 sec;   INR: 1.51          PTT - ( 12 Mar 2023 04:28 )  PTT:28.7 sec  Urinalysis Basic - ( 12 Mar 2023 13:16 )    Color: Yellow / Appearance: Clear / S.025 / pH: x  Gluc: x / Ketone: Trace mg/dL  / Bili: Negative / Urobili: 4.0 E.U./dL   Blood: x / Protein: 30 mg/dL / Nitrite: POSITIVE   Leuk Esterase: NEGATIVE / RBC: > 10 /HPF / WBC < 5 /HPF   Sq Epi: x / Non Sq Epi: 0-5 /HPF / Bacteria: Present /HPF      CARDIAC MARKERS ( 12 Mar 2023 04:28 )  x     / 0.02 ng/mL / x     / x     / x          CAPILLARY BLOOD GLUCOSE      POCT Blood Glucose.: 141 mg/dL (13 Mar 2023 22:45)  POCT Blood Glucose.: 125 mg/dL (13 Mar 2023 16:46)  POCT Blood Glucose.: 96 mg/dL (13 Mar 2023 10:41)  POCT Blood Glucose.: 133 mg/dL (13 Mar 2023 06:32)      Drug Levels: [] N/A    CSF Analysis: [] N/A      Allergies    amoxicillin (Rash)    Intolerances      MEDICATIONS:  Antibiotics:  cefepime   IVPB 2000 milliGRAM(s) IV Intermittent every 8 hours    Neuro:  acetaminophen     Tablet .. 650 milliGRAM(s) Oral every 6 hours PRN  levETIRAcetam 750 milliGRAM(s) Oral two times a day  ondansetron Injectable 4 milliGRAM(s) IV Push every 8 hours PRN    Anticoagulation:  enoxaparin Injectable 40 milliGRAM(s) SubCutaneous <User Schedule>    OTHER:  acetylcysteine 20%  Inhalation 4 milliLiter(s) Inhalation every 6 hours  albuterol/ipratropium for Nebulization 3 milliLiter(s) Nebulizer every 6 hours PRN  aMIOdarone    Tablet 400 milliGRAM(s) Oral two times a day  atorvastatin 20 milliGRAM(s) Oral at bedtime  chlorhexidine 0.12% Liquid 15 milliLiter(s) Oral Mucosa every 12 hours  chlorhexidine 2% Cloths 1 Application(s) Topical <User Schedule>  fluticasone propionate 50 MICROgram(s)/spray Nasal Spray 1 Spray(s) Both Nostrils two times a day  insulin glargine Injectable (LANTUS) 10 Unit(s) SubCutaneous at bedtime  insulin regular  human corrective regimen sliding scale   SubCutaneous every 6 hours  insulin regular  human recombinant 5 Unit(s) SubCutaneous every 6 hours  metoprolol tartrate 25 milliGRAM(s) Oral every 12 hours  sodium chloride 3%  Inhalation 4 milliLiter(s) Inhalation every 6 hours  sucralfate 1 Gram(s) Oral every 12 hours    IVF:  iron sucrose IVPB 300 milliGRAM(s) IV Intermittent every 24 hours    CULTURES:  Culture Results:   No growth at 1 day. ( @ 12:22)  Culture Results:   No growth at 1 day. ( @ 12:22)    RADIOLOGY & ADDITIONAL TESTS:      ASSESSMENT:  67 y/o right handed male with pmhx of T2DM, HLD, JAGDISH, hyponatremia, tracheal stenosis s/p tracheostomy, and glioblastoma now s/p cerebral angiogram with Avastin treatment (3/7/23). c/b cardiogenic shock, cardiomyopathy, improving.     C71.9    Abnormal electrocardiogram [ECG] [EKG]    Allergy, unspecified, initial encounter    Anemia, unspecified    Benign prostatic hyperplasia without lower urinary tract symptoms    Calculus of kidney    Cough, unspecified    Depression, unspecified    ENCOUNTER FOR ATTENT    Encounter for general adult medical examination without abnormal findings    Encounter for immunization    Encounter for immunization safety counseling    Encounter for other preprocedural examination    Encounter for screening for malignant neoplasm of prostate    Encounter for screening for other metabolic disorders    Encounter for screening for other viral diseases    ESSENTIAL (PRIMARY)    Gastro-esophageal reflux disease without esophagitis    Generalized anxiety disorder    History of Glioblastoma    Hyperlipidemia, unspecified    Hypo-osmolality and hyponatremia    Hypotension, unspecified    Iron deficiency    Iron deficiency anemia, unspecified    LONG TERM (CURRENT)    LONG TERM (CURRENT)    LONG TERM (CURRENT)    Malignant neoplasm of brain, unspecified    Nausea with vomiting, unspecified    Other constipation    Other fatigue    Other injury of unspecified body region, initial encounter    OTHER SPECIFIED DISE    Personal history of malignant neoplasm, unspecified    Personal history of other diseases of the nervous system and sense organs    Personal history of other endocrine, nutritional and metabolic disease    Personal history of other mental and behavioral disorders    Personal history of transient ischemic attack (TIA), and cerebral infarction without residual deficits    POSTPROCEDURAL SUBGL    Pruritus, unspecified    Shortness of breath    Tachycardia, unspecified    Thrombocytopenia, unspecified    Unspecified abdominal pain    Unspecified visual disturbance    Urinary calculus, unspecified    Vitamin D deficiency, unspecified    No pertinent family history in first degree relatives    FH: diabetes mellitus (Father, Mother)    FH: hyperlipidemia (Father)    FH: hypertension (Father, Mother)    Handoff    No pertinent past medical history    Diabetes mellitus    Hypertension    Glioblastoma    Type 2 diabetes mellitus    Hyperlipidemia    Iron deficiency anemia    Nephrolithiasis    Thrombocytopenia    Hyponatremia    BPH (benign prostatic hyperplasia)    No pertinent past medical history    GBM (glioblastoma multiforme)    GBM (glioblastoma multiforme)    Acute respiratory failure with hypoxia    Tracheostomy status    Acute hyponatremia    Angiogram, carotid and cerebral arteries    No significant past surgical history    No significant past surgical history    S/P craniotomy    H/O tracheostomy    No significant past surgical history    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        PLAN:  Neuro   - Vitals/neuro q4   - CTA/CTP/repeat CTH negative   - Pain control   - Cont home Keppra 750 BID   - MRI w/wo on stepdown     Cards  - Goal MAP > 65   - TTE EF 15-20%, akinetic apex and midsegments suggestive of takotsu   - Repeat echo 3/13, EF 20%   - Per cards: started on lopressor 25 BID and amiodarone gtt on 3/11, now on amio  mg BID     Pulm  - + Trach   - duonebs, mucomyst, saline nebs for secretions   - Pulm following     GI  - NGT - Osmolite 1.2 @ 65 d/t diarrhea   - Bowel regimen held for loose stool, + banatrol, last BM 3/13  - sucralfate    RENAL:   - Chronic hyponatremia   - Voiding     HEME:  - DVT ppx: SQL, SCD L side only, 3/11 antixa 0.23  - LE dopplers + R IM calf DVT, continue serial dopplers 3/13  - Heme consulted for thrombocytopenia- recommend IV iron x 3 days  - Per heme, recommend full AC with hep gtt for DVT    ID:   - Last panculture 3/12, Sputum psudomonas + staph aureus   - Cefepime 2 g q 8 (3/12 - 3/18) x 7 d   - C.diff negative      ENDO:   - DKA resolved    - DM: A1C 5.8, coninue ISS   - Lantus 10u at bedtime, Reg insulin 5 u q6h    DISPO:   - ICU status, full code, dispo pending     D/W Dr. Ho and Dr. Huggins      Assessment:  Present when checked    []  GCS  E   V  M     Heart Failure: []Acute, [] acute on chronic , []chronic  Heart Failure:  [] Diastolic (HFpEF), [] Systolic (HFrEF), []Combined (HFpEF and HFrEF), [] RHF, [] Pulm HTN, [] Other    [] EUGENIA, [] ATN, [] AIN, [] other  [] CKD1, [] CKD2, [] CKD 3, [] CKD 4, [] CKD 5, []ESRD    Encephalopathy: [] Metabolic, [] Hepatic, [] toxic, [] Neurological, [] Other    Abnormal Nurtitional Status: [] malnurtition (see nutrition note), [ ]underweight: BMI < 19, [] morbid obesity: BMI >40, [] Cachexia    [] Sepsis  [] hypovolemic shock,[] cardiogenic shock, [] hemorrhagic shock, [] neuogenic shock  [] Acute Respiratory Failure  []Cerebral edema, [] Brain compression/ herniation,   [] Functional quadriplegia  [] Acute blood loss anemia

## 2023-03-14 NOTE — PROGRESS NOTE ADULT - SUBJECTIVE AND OBJECTIVE BOX
***********************************************  ADULT NSICU PROGRESS NOTE  JORDAN CHAKRABORTY [id number] [facility] [id number] [id number]  ***********************************************    24H INTERVAL EVENTS: reported decreased UOP by nursing this afternoon.    ROS: negative except per mentioned above in 24h interval events.      VITALS:    ICU Vital Signs Last 24 Hrs  T(C): 37.4 (13 Mar 2023 05:11), Max: 38.4 (12 Mar 2023 21:53)  T(F): 99.3 (13 Mar 2023 05:11), Max: 101.2 (12 Mar 2023 21:53)  HR: 83 (13 Mar 2023 07:00) (82 - 104)  BP: 99/71 (13 Mar 2023 07:00) (86/57 - 112/76)  BP(mean): 80 (13 Mar 2023 07:00) (67 - 89)  ABP: 115/61 (13 Mar 2023 07:00) (96/51 - 127/64)  ABP(mean): 79 (13 Mar 2023 07:00) (67 - 85)  RR: 20 (13 Mar 2023 07:00) (17 - 24)  SpO2: 100% (13 Mar 2023 07:00) (93% - 100%)    O2 Parameters below as of 13 Mar 2023 07:00  Patient On (Oxygen Delivery Method): ventilator    O2 Concentration (%): 40        Mode: CPAP with PS  FiO2: 40  PEEP: 7  PS: 5  MAP: 12  PIP: 20      I&O's Summary    12 Mar 2023 07:01  -  13 Mar 2023 07:00  --------------------------------------------------------  IN: 2447 mL / OUT: 1300 mL / NET: 1147 mL    13 Mar 2023 07:01  -  13 Mar 2023 07:23  --------------------------------------------------------  IN: 65 mL / OUT: 0 mL / NET: 65 mL        EXAM:     [place] Coma Scale: 15    General: normocephalic, laying in bed, trach to vent, in no distress  Neuro     MS: A/Ox3 (to 'hospital' and appropriate month/year), cooperative with examination, no neglect, comprehension intact,    CN: PERRL, EOMI and no ptosis bilaterally, face symmetric, hearing grossly intact    Mot: bulk normal, tone normal, power 5/5 in bilateral upper and 5/5 in bilateral lower ext    Sens: intact to noxious stimuli in bilateral upper and lower ext  HEENT: tracheostomy to ventilator  Chest: nonlabored respirations, no adventitious lung sounds bilaterally, heart regular rate/rhythm, present S1/S2, no murmurs or rubs  Abdomen: nondistended, soft and nontender without peritoneal signs, normoactive bowel sounds  Extremities: no clubbing, well-perfused, no edema    LABORATORY DATA:    ABG - ( 13 Mar 2023 05:50 )  pH, Arterial: 7.52  pH, Blood: x     /  pCO2: 35    /  pO2: 163   / HCO3: 29    / Base Excess: 5.7   /  SaO2: 98.5                                    9.8    5.98  )-----------( 108      ( 13 Mar 2023 05:30 )             28.1     03-13    132<L>  |  96  |  18  ----------------------------<  184<H>  3.6   |  26  |  0.59    Ca    7.8<L>      13 Mar 2023 05:30  Phos  2.1     03-13  Mg     1.9     03-13    TPro  5.4<L>  /  Alb  2.6<L>  /  TBili  0.9  /  DBili  x   /  AST  29  /  ALT  41  /  AlkPhos  72  03-13    LIVER FUNCTIONS - ( 13 Mar 2023 05:30 )  Alb: 2.6 g/dL / Pro: 5.4 g/dL / ALK PHOS: 72 U/L / ALT: 41 U/L / AST: 29 U/L / GGT: x           PT/INR - ( 12 Mar 2023 04:28 )   PT: 18.1 sec;   INR: 1.51          PTT - ( 12 Mar 2023 04:28 )  PTT:28.7 sec    IMAGING DATA:    CARDIOLOGY DATA:    MICROBIOLOGY DATA:      Urinalysis with Rflx Culture (collected 12 Mar 2023 13:16)    Culture - Blood (collected 12 Mar 2023 12:22)  Source: .Blood Blood  Preliminary Report (13 Mar 2023 02:00):    No growth at 12 hours    Culture - Blood (collected 12 Mar 2023 12:22)  Source: .Blood Blood  Preliminary Report (13 Mar 2023 01:00):    No growth at 12 hours    Culture - Sputum (collected 10 Mar 2023 17:19)  Source: ET Tube ET Tube  Gram Stain (11 Mar 2023 21:21):    Rare epithelial cells    Few White blood cells    Few Gram positive cocci in pairs and clusters    Rare Gram Positive Rods    Moderate Gram Negative Rods  Preliminary Report (12 Mar 2023 09:58):    Numerous Pseudomonas aeruginosa Susceptibility to follow.    Moderate Staphylococcus aureus Susceptibility to follow.    Normal Respiratory Shani present    Culture in progress        MEDICATIONS  (STANDING):  acetylcysteine 20%  Inhalation 4 milliLiter(s) Inhalation every 6 hours  aMIOdarone    Tablet 400 milliGRAM(s) Oral two times a day  atorvastatin 20 milliGRAM(s) Oral at bedtime  cefepime   IVPB 2000 milliGRAM(s) IV Intermittent every 8 hours  chlorhexidine 0.12% Liquid 15 milliLiter(s) Oral Mucosa every 12 hours  chlorhexidine 2% Cloths 1 Application(s) Topical <User Schedule>  enoxaparin Injectable 40 milliGRAM(s) SubCutaneous <User Schedule>  fluticasone propionate 50 MICROgram(s)/spray Nasal Spray 1 Spray(s) Both Nostrils two times a day  insulin glargine Injectable (LANTUS) 10 Unit(s) SubCutaneous at bedtime  insulin regular  human corrective regimen sliding scale   SubCutaneous every 6 hours  insulin regular  human recombinant 6 Unit(s) SubCutaneous every 6 hours  iron sucrose IVPB 300 milliGRAM(s) IV Intermittent every 24 hours  levETIRAcetam 750 milliGRAM(s) Oral two times a day  metoprolol tartrate 25 milliGRAM(s) Oral every 12 hours  sodium chloride 3%  Inhalation 4 milliLiter(s) Inhalation every 6 hours  sodium phosphate 30 milliMole(s)/500 mL IVPB 30 milliMole(s) IV Intermittent once  sucralfate 1 Gram(s) Oral every 12 hours  vancomycin  IVPB 1000 milliGRAM(s) IV Intermittent every 12 hours    MEDICATIONS  (PRN):  acetaminophen     Tablet .. 650 milliGRAM(s) Oral every 6 hours PRN Temp greater or equal to 38C (100.4F), Mild Pain (1 - 3)  albuterol/ipratropium for Nebulization 3 milliLiter(s) Nebulizer every 6 hours PRN Shortness of Breath and/or Wheezing  ondansetron Injectable 4 milliGRAM(s) IV Push every 8 hours PRN Nausea and/or Vomiting  sodium chloride 0.9% lock flush 10 milliLiter(s) IV Push every 1 hour PRN Pre/post blood products, medications, blood draw, and to maintain line patency      ***********************************************  ASSESSMENT AND PLAN  ***********************************************    #S/p cerebral angio for IA avastin  #Acute HF 2/2 stress cardiomyopathy  #Acute T1 respiratory failure requiring mechanical ventilation  #Supraventricular tachycardia  #Diarrhea  #Metabolic acidosis, DKA - resolved  #Chronic hyponatremia  #Thrombocytopenia likely secondary to avastin  #R. 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 sided weakness post operatively  PULM - trial of trach collar, SpO2 goal > 92%, supplemental O2 and pulm toileting as needed  CARDIO - BP goal NORMOTENSION, MAP > 65, EF = 20%, lopressor, amiodaron 400/400, lasix prn  GI - bowel regimen, stool count, diet: TFs, PPI (home med)  /RENAL - monitor UOP/volume status, BUN/SCr  HEME - maintain Hb > 7.0, PLT > 10,000/20,000 if febrile/50,000 if bleeding, 100,000 if life threatening bleeding  ID - monitor for infectious s/s, fever curve, leukocytosis; CPM 2g q8 for VAP (MSSA/Pseudomonas)  ENDO - SGlu goal < 200, lantus/regular insulin ***********************************************  ADULT NSICU PROGRESS NOTE  JORDAN CHAKRABORTY [id number] [facility] [id number] [id number]  ***********************************************    24H INTERVAL EVENTS:      ROS: negative except per mentioned above in 24h interval events.      VITALS:    ICU Vital Signs Last 24 Hrs  T(C): 37.4 (13 Mar 2023 05:11), Max: 38.4 (12 Mar 2023 21:53)  T(F): 99.3 (13 Mar 2023 05:11), Max: 101.2 (12 Mar 2023 21:53)  HR: 83 (13 Mar 2023 07:00) (82 - 104)  BP: 99/71 (13 Mar 2023 07:00) (86/57 - 112/76)  BP(mean): 80 (13 Mar 2023 07:00) (67 - 89)  ABP: 115/61 (13 Mar 2023 07:00) (96/51 - 127/64)  ABP(mean): 79 (13 Mar 2023 07:00) (67 - 85)  RR: 20 (13 Mar 2023 07:00) (17 - 24)  SpO2: 100% (13 Mar 2023 07:00) (93% - 100%)    O2 Parameters below as of 13 Mar 2023 07:00  Patient On (Oxygen Delivery Method): ventilator    O2 Concentration (%): 40        Mode: CPAP with PS  FiO2: 40  PEEP: 7  PS: 5  MAP: 12  PIP: 20      I&O's Summary    12 Mar 2023 07:01  -  13 Mar 2023 07:00  --------------------------------------------------------  IN: 2447 mL / OUT: 1300 mL / NET: 1147 mL    13 Mar 2023 07:01  -  13 Mar 2023 07:23  --------------------------------------------------------  IN: 65 mL / OUT: 0 mL / NET: 65 mL        EXAM:     [place] Coma Scale: 15    General: normocephalic, laying in bed, trach to vent, in no distress  Neuro     MS: A/Ox3 (to 'hospital' and appropriate month/year), cooperative with examination, no neglect, comprehension intact,    CN: PERRL, EOMI and no ptosis bilaterally, face symmetric, hearing grossly intact    Mot: bulk normal, tone normal, power 5/5 in bilateral upper and 5/5 in bilateral lower ext    Sens: intact to noxious stimuli in bilateral upper and lower ext  HEENT: tracheostomy to ventilator  Chest: nonlabored respirations, no adventitious lung sounds bilaterally, heart regular rate/rhythm, present S1/S2, no murmurs or rubs  Abdomen: nondistended, soft and nontender without peritoneal signs, normoactive bowel sounds  Extremities: no clubbing, well-perfused, no edema    LABORATORY DATA:    ABG - ( 13 Mar 2023 05:50 )  pH, Arterial: 7.52  pH, Blood: x     /  pCO2: 35    /  pO2: 163   / HCO3: 29    / Base Excess: 5.7   /  SaO2: 98.5                                    9.8    5.98  )-----------( 108      ( 13 Mar 2023 05:30 )             28.1     03-13    132<L>  |  96  |  18  ----------------------------<  184<H>  3.6   |  26  |  0.59    Ca    7.8<L>      13 Mar 2023 05:30  Phos  2.1     03-13  Mg     1.9     03-13    TPro  5.4<L>  /  Alb  2.6<L>  /  TBili  0.9  /  DBili  x   /  AST  29  /  ALT  41  /  AlkPhos  72  03-13    LIVER FUNCTIONS - ( 13 Mar 2023 05:30 )  Alb: 2.6 g/dL / Pro: 5.4 g/dL / ALK PHOS: 72 U/L / ALT: 41 U/L / AST: 29 U/L / GGT: x           PT/INR - ( 12 Mar 2023 04:28 )   PT: 18.1 sec;   INR: 1.51          PTT - ( 12 Mar 2023 04:28 )  PTT:28.7 sec    IMAGING DATA:    CARDIOLOGY DATA:    MICROBIOLOGY DATA:      Urinalysis with Rflx Culture (collected 12 Mar 2023 13:16)    Culture - Blood (collected 12 Mar 2023 12:22)  Source: .Blood Blood  Preliminary Report (13 Mar 2023 02:00):    No growth at 12 hours    Culture - Blood (collected 12 Mar 2023 12:22)  Source: .Blood Blood  Preliminary Report (13 Mar 2023 01:00):    No growth at 12 hours    Culture - Sputum (collected 10 Mar 2023 17:19)  Source: ET Tube ET Tube  Gram Stain (11 Mar 2023 21:21):    Rare epithelial cells    Few White blood cells    Few Gram positive cocci in pairs and clusters    Rare Gram Positive Rods    Moderate Gram Negative Rods  Preliminary Report (12 Mar 2023 09:58):    Numerous Pseudomonas aeruginosa Susceptibility to follow.    Moderate Staphylococcus aureus Susceptibility to follow.    Normal Respiratory Shani present    Culture in progress        MEDICATIONS  (STANDING):  acetylcysteine 20%  Inhalation 4 milliLiter(s) Inhalation every 6 hours  aMIOdarone    Tablet 400 milliGRAM(s) Oral two times a day  atorvastatin 20 milliGRAM(s) Oral at bedtime  cefepime   IVPB 2000 milliGRAM(s) IV Intermittent every 8 hours  chlorhexidine 0.12% Liquid 15 milliLiter(s) Oral Mucosa every 12 hours  chlorhexidine 2% Cloths 1 Application(s) Topical <User Schedule>  enoxaparin Injectable 40 milliGRAM(s) SubCutaneous <User Schedule>  fluticasone propionate 50 MICROgram(s)/spray Nasal Spray 1 Spray(s) Both Nostrils two times a day  insulin glargine Injectable (LANTUS) 10 Unit(s) SubCutaneous at bedtime  insulin regular  human corrective regimen sliding scale   SubCutaneous every 6 hours  insulin regular  human recombinant 6 Unit(s) SubCutaneous every 6 hours  iron sucrose IVPB 300 milliGRAM(s) IV Intermittent every 24 hours  levETIRAcetam 750 milliGRAM(s) Oral two times a day  metoprolol tartrate 25 milliGRAM(s) Oral every 12 hours  sodium chloride 3%  Inhalation 4 milliLiter(s) Inhalation every 6 hours  sodium phosphate 30 milliMole(s)/500 mL IVPB 30 milliMole(s) IV Intermittent once  sucralfate 1 Gram(s) Oral every 12 hours  vancomycin  IVPB 1000 milliGRAM(s) IV Intermittent every 12 hours    MEDICATIONS  (PRN):  acetaminophen     Tablet .. 650 milliGRAM(s) Oral every 6 hours PRN Temp greater or equal to 38C (100.4F), Mild Pain (1 - 3)  albuterol/ipratropium for Nebulization 3 milliLiter(s) Nebulizer every 6 hours PRN Shortness of Breath and/or Wheezing  ondansetron Injectable 4 milliGRAM(s) IV Push every 8 hours PRN Nausea and/or Vomiting  sodium chloride 0.9% lock flush 10 milliLiter(s) IV Push every 1 hour PRN Pre/post blood products, medications, blood draw, and to maintain line patency      ***********************************************  ASSESSMENT AND PLAN  ***********************************************    #S/p cerebral angio for IA avastin  #Acute HF 2/2 stress cardiomyopathy  #Acute T1 respiratory failure requiring mechanical ventilation  #Supraventricular tachycardia  #Diarrhea  #Metabolic acidosis, DKA - resolved  #Chronic hyponatremia  #Thrombocytopenia likely secondary to avastin  #R. 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 sided weakness post operatively  PULM - trial of trach collar, SpO2 goal > 92%, supplemental O2 and pulm toileting as needed  CARDIO - BP goal NORMOTENSION, MAP > 65, EF = 20%, lopressor, amiodaron 400/400, lasix prn  GI - bowel regimen, stool count, diet: TFs, PPI (home med)  /RENAL - monitor UOP/volume status, BUN/SCr  HEME - maintain Hb > 7.0, PLT > 10,000/20,000 if febrile/50,000 if bleeding, 100,000 if life threatening bleeding  ID - monitor for infectious s/s, fever curve, leukocytosis; CPM 2g q8 for VAP (MSSA/Pseudomonas)  ENDO - SGlu goal < 200, lantus/regular insulin ***********************************************  ADULT NSICU PROGRESS NOTE  JORDAN CHAKRABORTY [id number] [facility] 08EA [id number]  ***********************************************    24H INTERVAL EVENTS:    - Self pulled IJ CVC overnight  - Trach collar today      ROS: negative except per mentioned above in 24h interval events.      VITALS:    ICU Vital Signs Last 24 Hrs  T(C): 37.6 (14 Mar 2023 08:58), Max: 37.6 (14 Mar 2023 08:58)  T(F): 99.7 (14 Mar 2023 08:58), Max: 99.7 (14 Mar 2023 08:58)  HR: 78 (14 Mar 2023 08:00) (78 - 109)  BP: 120/79 (14 Mar 2023 02:00) (96/62 - 120/79)  BP(mean): 93 (14 Mar 2023 02:00) (74 - 93)  ABP: 92/52 (14 Mar 2023 08:00) (92/52 - 146/69)  ABP(mean): 67 (14 Mar 2023 08:00) (67 - 117)  RR: 17 (14 Mar 2023 08:00) (17 - 19)  SpO2: 97% (14 Mar 2023 08:00) (91% - 99%)    O2 Parameters below as of 14 Mar 2023 08:00  Patient On (Oxygen Delivery Method): ventilator    O2 Concentration (%): 40        EXAM:     [place] Coma Scale: 15    General: normocephalic, laying in bed, trach to vent, in no distress  Neuro     MS: A/Ox3 (to 'hospital' and appropriate month/year), cooperative with examination, no neglect, comprehension intact,    CN: PERRL, EOMI and no ptosis bilaterally, face symmetric, hearing grossly intact    Mot: bulk normal, tone normal, power 5/5 in bilateral upper and 5/5 in bilateral lower ext    Sens: intact to noxious stimuli in bilateral upper and lower ext  HEENT: tracheostomy to ventilator  Chest: nonlabored respirations, no adventitious lung sounds bilaterally, heart regular rate/rhythm, present S1/S2, no murmurs or rubs  Abdomen: nondistended, soft and nontender without peritoneal signs, normoactive bowel sounds  Extremities: no clubbing, well-perfused, no edema    LABORATORY DATA:    ABG - ( 13 Mar 2023 05:50 )  pH, Arterial: 7.52  pH, Blood: x     /  pCO2: 35    /  pO2: 163   / HCO3: 29    / Base Excess: 5.7   /  SaO2: 98.5                                    9.5    7.16  )-----------( 122      ( 14 Mar 2023 05:25 )             28.0     03-14    131<L>  |  97  |  18  ----------------------------<  177<H>  4.1   |  26  |  0.60    Ca    8.4      14 Mar 2023 05:25  Phos  2.6     03-14  Mg     1.9     03-14    TPro  5.4<L>  /  Alb  2.6<L>  /  TBili  0.9  /  DBili  x   /  AST  29  /  ALT  41  /  AlkPhos  72  03-13      MEDICATIONS  (STANDING):  aMIOdarone    Tablet 400 milliGRAM(s) Oral two times a day  atorvastatin 20 milliGRAM(s) Oral at bedtime  cefepime   IVPB 2000 milliGRAM(s) IV Intermittent every 8 hours  chlorhexidine 0.12% Liquid 15 milliLiter(s) Oral Mucosa every 12 hours  chlorhexidine 2% Cloths 1 Application(s) Topical <User Schedule>  enoxaparin Injectable 70 milliGRAM(s) SubCutaneous every 12 hours  fluticasone propionate 50 MICROgram(s)/spray Nasal Spray 1 Spray(s) Both Nostrils two times a day  insulin glargine Injectable (LANTUS) 10 Unit(s) SubCutaneous at bedtime  insulin regular  human corrective regimen sliding scale   SubCutaneous every 6 hours  insulin regular  human recombinant 5 Unit(s) SubCutaneous every 6 hours  iron sucrose IVPB 300 milliGRAM(s) IV Intermittent every 24 hours  levETIRAcetam 750 milliGRAM(s) Oral two times a day  metoprolol tartrate 25 milliGRAM(s) Oral every 12 hours  sodium phosphate 15 milliMole(s)/250 mL IVPB 15 milliMole(s) IV Intermittent once  sucralfate 1 Gram(s) Oral every 12 hours    MEDICATIONS  (PRN):  acetaminophen     Tablet .. 650 milliGRAM(s) Oral every 6 hours PRN Temp greater or equal to 38C (100.4F), Mild Pain (1 - 3)  acetylcysteine 20%  Inhalation 4 milliLiter(s) Inhalation every 6 hours PRN secretions  albuterol/ipratropium for Nebulization 3 milliLiter(s) Nebulizer every 6 hours PRN Shortness of Breath and/or Wheezing  ondansetron Injectable 4 milliGRAM(s) IV Push every 8 hours PRN Nausea and/or Vomiting  sodium chloride 0.9% lock flush 10 milliLiter(s) IV Push every 1 hour PRN Pre/post blood products, medications, blood draw, and to maintain line patency  sodium chloride 3%  Inhalation 4 milliLiter(s) Inhalation every 6 hours PRN secretions      ***********************************************  ASSESSMENT AND PLAN  ***********************************************    #S/p cerebral angio for IA avastin  #Acute HF 2/2 stress cardiomyopathy  #Acute T1 respiratory failure requiring mechanical ventilation  #Supraventricular tachycardia  #Diarrhea  #Metabolic acidosis, DKA - resolved  #Chronic hyponatremia  #Thrombocytopenia likely secondary to avastin  #R. 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 sided weakness post operatively  PULM - trial of trach collar, SpO2 goal > 92%, supplemental O2 and pulm toileting as needed  CARDIO - BP goal NORMOTENSION, MAP > 65, EF = 20%, lopressor, amiodarone 400/400, lasix prn  GI - bowel regimen, stool count, diet: TFs, PPI (home med)  /RENAL - monitor UOP/volume status, BUN/SCr  HEME - maintain Hb > 7.0, PLT > 10,000/20,000 if febrile/50,000 if bleeding, 100,000 if life threatening bleeding  ID - monitor for infectious s/s, fever curve, leukocytosis; CPM 2g q8 for VAP (MSSA/Pseudomonas/serratia)  ENDO - SGlu goal < 200, lantus/regular insulin

## 2023-03-15 NOTE — PROGRESS NOTE ADULT - SUBJECTIVE AND OBJECTIVE BOX
Cardiology Consult Service Progress Note       Patient is a 68y old  Male who presents with a chief complaint of cerebral angiogram with Avastin (14 Mar 2023 14:06)      SUBJECTIVE/OVERNIGHT EVENTS   No overnight events.    Seen and examined at bedside. Patient denies any complaints.     OBJECTIVE  Vital Signs Last 24 Hrs  T(C): 37.6 (14 Mar 2023 13:59), Max: 37.6 (14 Mar 2023 08:58)  T(F): 99.6 (14 Mar 2023 13:59), Max: 99.7 (14 Mar 2023 08:58)  HR: 82 (14 Mar 2023 11:00) (78 - 109)  BP: 118/74 (14 Mar 2023 11:00) (96/62 - 120/79)  BP(mean): 91 (14 Mar 2023 11:00) (74 - 93)  RR: 16 (14 Mar 2023 11:00) (16 - 18)  SpO2: 97% (14 Mar 2023 11:00) (91% - 99%)    Parameters below as of 14 Mar 2023 11:00  Patient On (Oxygen Delivery Method): tracheostomy collar  O2 Flow (L/min): 10  O2 Concentration (%): 50    I&O's Summary    13 Mar 2023 07:01  -  14 Mar 2023 07:00  --------------------------------------------------------  IN: 2205 mL / OUT: 900 mL / NET: 1305 mL    14 Mar 2023 07:01  -  14 Mar 2023 14:31  --------------------------------------------------------  IN: 120 mL / OUT: 300 mL / NET: -180 mL        PHYSICAL EXAM:  GENERAL: NAD, well-developed  HEAD:  Atraumatic, Normocephalic  EYES: EOMI, conjunctiva and sclera clear  NECK: Supple, No JVD  CHEST/LUNG: Clear to auscultation bilaterally; No wheeze  HEART: Regular rate and rhythm; No murmurs, rubs, or gallops  ABDOMEN: Soft, Nontender, Nondistended; Bowel sounds present  EXTREMITIES:  2+ Peripheral Pulses, No clubbing, cyanosis, or edema  PSYCH: AAOx3  NEUROLOGY: non-focal  SKIN: No rashes or lesions    LABS                        9.5    7.16  )-----------( 122      ( 14 Mar 2023 05:25 )             28.0                         9.8    5.98  )-----------( 108      ( 13 Mar 2023 05:30 )             28.1     03-14    131<L>  |  97  |  18  ----------------------------<  177<H>  4.1   |  26  |  0.60  03-13    132<L>  |  96  |  18  ----------------------------<  184<H>  3.6   |  26  |  0.59    Ca    8.4      14 Mar 2023 05:25  Ca    7.8<L>      13 Mar 2023 05:30  Phos  2.6     03-14  Mg     1.9     03-14    TPro  5.4<L>  /  Alb  2.6<L>  /  TBili  0.9  /  DBili  x   /  AST  29  /  ALT  41  /  AlkPhos  72  03-13    CAPILLARY BLOOD GLUCOSE      POCT Blood Glucose.: 80 mg/dL (14 Mar 2023 10:46)  POCT Blood Glucose.: 193 mg/dL (14 Mar 2023 06:06)  POCT Blood Glucose.: 141 mg/dL (13 Mar 2023 22:45)  POCT Blood Glucose.: 125 mg/dL (13 Mar 2023 16:46)       12 Mar 2023 04:28 Troponin 0.02 ng/mL / CK x     / CKMB x     / CKMB Units x       11 Mar 2023 17:21 Troponin 0.03 ng/mL / CK 94 U/L / CKMB x     / CKMB Units <1.0 ng/mL   08 Mar 2023 23:55 Troponin 0.13 ng/mL / CK x     / CKMB x     / CKMB Units x            ( 13 Mar 2023 05:50 ) pH: 7.52  /  pCO2: 35    /  pO2: 163   / HCO3: 29    / Base Excess: 5.7   /  SaO2: 98.5            ( 12 Mar 2023 04:24 ) pH: 7.48  /  pCO2: 36    /  pO2: 138   / HCO3: 27    / Base Excess: 3.4   /  SaO2: 99.0            ( 11 Mar 2023 08:13 ) pH: 7.47  /  pCO2: 38    /  pO2: 98    / HCO3: 28    / Base Excess: 3.9   /  SaO2: 98.2                      RADIOLOGY & ADDITIONAL TESTS:    MEDICATIONS  (STANDING):  aMIOdarone    Tablet 400 milliGRAM(s) Oral two times a day  atorvastatin 20 milliGRAM(s) Oral at bedtime  cefepime   IVPB 2000 milliGRAM(s) IV Intermittent every 8 hours  chlorhexidine 0.12% Liquid 15 milliLiter(s) Oral Mucosa every 12 hours  chlorhexidine 2% Cloths 1 Application(s) Topical <User Schedule>  enoxaparin Injectable 70 milliGRAM(s) SubCutaneous every 12 hours  fluticasone propionate 50 MICROgram(s)/spray Nasal Spray 1 Spray(s) Both Nostrils two times a day  insulin glargine Injectable (LANTUS) 10 Unit(s) SubCutaneous at bedtime  insulin regular  human corrective regimen sliding scale   SubCutaneous every 6 hours  insulin regular  human recombinant 5 Unit(s) SubCutaneous every 6 hours  iron sucrose IVPB 300 milliGRAM(s) IV Intermittent every 24 hours  levETIRAcetam 750 milliGRAM(s) Oral two times a day  metoprolol tartrate 25 milliGRAM(s) Oral every 12 hours  sucralfate 1 Gram(s) Oral every 12 hours    MEDICATIONS  (PRN):  acetaminophen     Tablet .. 650 milliGRAM(s) Oral every 6 hours PRN Temp greater or equal to 38C (100.4F), Mild Pain (1 - 3)  acetylcysteine 20%  Inhalation 4 milliLiter(s) Inhalation every 6 hours PRN secretions  albuterol/ipratropium for Nebulization 3 milliLiter(s) Nebulizer every 6 hours PRN Shortness of Breath and/or Wheezing  ondansetron Injectable 4 milliGRAM(s) IV Push every 8 hours PRN Nausea and/or Vomiting  sodium chloride 0.9% lock flush 10 milliLiter(s) IV Push every 1 hour PRN Pre/post blood products, medications, blood draw, and to maintain line patency  sodium chloride 3%  Inhalation 4 milliLiter(s) Inhalation every 6 hours PRN secretions

## 2023-03-15 NOTE — SPEAKING VALVE EVALUATION - DIAGNOSTIC IMPRESSIONS
Pt tolerating PMV and has good voicing at this time. He may benefit from trach change to cuffless trach to improve voicing, which is currently mildly strained.

## 2023-03-15 NOTE — PROGRESS NOTE ADULT - SUBJECTIVE AND OBJECTIVE BOX
SUBJECTIVE / INTERVAL HPI: Patient was seen and examined this morning. He had speech and swallow evaluation this afternoon and had valve placed for tracheostomy so that he could speak. Blood glucose levels have been intermittently elevated given he has been developing blood glucose levels in the 80s, for which the insulin is held completely, leading to subsequent hyperglycemia during next meal.     CAPILLARY BLOOD GLUCOSE & INSULIN RECEIVED  80 mg/dL (03-14 @ 10:46) ? Ø  217 mg/dL (03-14 @ 16:32) ? 5 units of regular insulin + 4 units of sliding scale.   89 mg/dL (03-14 @ 23:04) ? 10 units of lantus (No standing regular given as order was changed)  242 mg/dL (03-15 @ 05:42) ? 6 units of regular insulin + 4 units of sliding scale.  158 mg/dL (03-15 @ 13:12) ? 6 units of regular insulin + 2 units of sliding scale.   83 mg/dL (03-15 @ 18:07)     PHYSICAL EXAM  Vital Signs Last 24 Hrs  T(C): 36.8 (15 Mar 2023 17:40), Max: 37.1 (15 Mar 2023 01:18)  T(F): 98.3 (15 Mar 2023 17:40), Max: 98.8 (15 Mar 2023 01:18)  HR: 82 (15 Mar 2023 17:00) (70 - 89)  BP: 105/64 (15 Mar 2023 17:00) (99/66 - 115/72)  BP(mean): 80 (15 Mar 2023 17:00) (75 - 89)  RR: 18 (15 Mar 2023 17:00) (16 - 18)  SpO2: 97% (15 Mar 2023 17:00) (91% - 100%)    Parameters below as of 15 Mar 2023 17:00  Patient On (Oxygen Delivery Method): tracheostomy collar    O2 Concentration (%): 40    Constitutional: Awake, alert, male, in no distress.   Neck: (+) Tracheostomy.   Respiratory: (+) Rales, mild wheezing.   Cardiovascular: regular rhythm, normal S1 and S2, no audible murmurs.   GI: soft, non-distended, bowel sounds increased.   Extremities: No lower extremity edema.    LABS  CBC - WBC/HGB/HTC/PLT: 7.59/9.1/27.4/115 (03-15-23)  BMP - Na/K/Cl/Bicarb/BUN/Cr/Gluc/AG/eGFR: 128/3.8/93/28/17/0.67/230/7/102 (03-15-23)  Ca - 8.6 (03-15-23)  Phos - -- (03-15-23)  Mg - -- (03-15-23)  LFT - Alb/Tprot/Tbili/Dbili/AlkPhos/ALT/AST: 2.6/--/0.9/--/72/41/29 (03-13-23)  PT/aPTT/INR: 18.1/28.7/1.51 (03-12-23)   Thyroid Stimulating Hormone, Serum: 1.210 (03-08-23)    MEDICATIONS  MEDICATIONS  (STANDING):  aMIOdarone    Tablet 400 milliGRAM(s) Oral two times a day  atorvastatin 20 milliGRAM(s) Oral at bedtime  cefepime   IVPB 2000 milliGRAM(s) IV Intermittent every 8 hours  chlorhexidine 0.12% Liquid 15 milliLiter(s) Oral Mucosa every 12 hours  chlorhexidine 2% Cloths 1 Application(s) Topical <User Schedule>  enoxaparin Injectable 70 milliGRAM(s) SubCutaneous every 12 hours  escitalopram 5 milliGRAM(s) Oral daily  fluticasone propionate 50 MICROgram(s)/spray Nasal Spray 1 Spray(s) Both Nostrils two times a day  insulin glargine Injectable (LANTUS) 10 Unit(s) SubCutaneous at bedtime  insulin regular  human corrective regimen sliding scale   SubCutaneous every 6 hours  insulin regular  human recombinant 6 Unit(s) SubCutaneous every 6 hours  levETIRAcetam 750 milliGRAM(s) Oral two times a day  melatonin 5 milliGRAM(s) Oral at bedtime  metoprolol tartrate 25 milliGRAM(s) Oral every 12 hours  sodium chloride 2 Gram(s) Oral every 12 hours    MEDICATIONS  (PRN):  acetaminophen     Tablet .. 650 milliGRAM(s) Oral every 6 hours PRN Temp greater or equal to 38C (100.4F), Mild Pain (1 - 3)  acetylcysteine 20%  Inhalation 4 milliLiter(s) Inhalation every 6 hours PRN secretions  albuterol/ipratropium for Nebulization 3 milliLiter(s) Nebulizer every 6 hours PRN Shortness of Breath and/or Wheezing  ondansetron Injectable 4 milliGRAM(s) IV Push every 8 hours PRN Nausea and/or Vomiting  sodium chloride 0.9% lock flush 10 milliLiter(s) IV Push every 1 hour PRN Pre/post blood products, medications, blood draw, and to maintain line patency  sodium chloride 3%  Inhalation 4 milliLiter(s) Inhalation every 6 hours PRN secretions    ASSESSMENT / RECOMMENDATIONS  Mr. Norwood is a 68-year-old male with a past medical history of type 2 diabetes mellitus, hyperlipidemia, iron deficiency anemia, tracheal stenosis (s/p tracheostomy) and glioblastoma (found to have a mass in 1/2022, s/p resection of left frontal enhancing tumor with GLEOLAN placed on 1/27/22 with pathology showing gliosarcoma, WHO grade IV s/p radiation and chemotherapy completed on 12/2022, repeat MRI showing recurrence of brain mass on 12/2022) who presented for further management, now s/p cerebral angiogram with Avastin treatment (3/7/23). Post-operative course was complicated by flash pulmonary edema, desaturation, new global aphasia, right sided weakness, lactic acidosis and hyperglycemia. Endocrinology following for recommendations regarding glycemic control.     A1C: 5.8 %  BUN: 17  Creatinine: 0.67  GFR: 102  Weight: 71.214  BMI: 24.6    # Type 2 diabetes mellitus  - Blood glucose levels have been intermittently elevated given he has been developing blood glucose levels in the 80s, for which the insulin is held completely, leading to subsequent hyperglycemia during next meal. Suspect that sliding scale is too high for him and the correction is bringing him down to below 100s. Therefore, would recommend to decrease sliding scale to a low dose starting above 150 mg/dL.   - Please continue with lantus 10 units at bedtime.   - Continue with regular insulin 6 units every 6 hours while on tube feeds.   - Decrease regular insulin sliding scale to a low dose every 6 hours.  - Patient's fingerstick glucose goal is 100-180 mg/dL.      Case discussed with Dr. Hoang. Primary team updated.       Fabrice Madrid    Endocrinology Fellow    Service Pager: [phone]

## 2023-03-15 NOTE — CHART NOTE - NSCHARTNOTEFT_GEN_A_CORE
CXR this mornng with pulm congestion, given lasix 40mg IV. Speech/swallow pathologist evaluated today. Neuro exam stable. CXR this morning with pulm congestion, given lasix 40mg IV. Speech/swallow pathologist evaluated today. Neuro exam stable. CXR this morning with pulm congestion, given lasix 40mg IV. Speech/swallow pathologist evaluated today. Neuro exam stable. Plan for FEES tomorrow. Per endo, 4 regular insulin given.

## 2023-03-15 NOTE — SPEAKING VALVE EVALUATION - RECOMMENDATIONS
Pt was also tested on 4 blue-dyed ice-chips with PMV in place. No blue return when suctioned by RN. Discussed with NS team. Will allow Pt to have ice chips in moderation tonight & this [facility] will f/u for FEES tomorrow (3/16).

## 2023-03-15 NOTE — PROGRESS NOTE ADULT - SUBJECTIVE AND OBJECTIVE BOX
***********************************************  ADULT NSICU PROGRESS NOTE  JORDAN CHAKRABORTY [id number] [facility] [id number]  ***********************************************    24H INTERVAL EVENTS:  no acute overnight events.  Tolerated trach collar.    ROS: negative except per mentioned above in 24h interval events.      VITALS:    ICU Vital Signs Last 24 Hrs  T(C): 36.5 (15 Mar 2023 09:25), Max: 37.6 (14 Mar 2023 13:59)  T(F): 97.7 (15 Mar 2023 09:25), Max: 99.6 (14 Mar 2023 13:59)  HR: 74 (15 Mar 2023 08:40) (70 - 89)  BP: 99/66 (15 Mar 2023 08:00) (99/63 - 115/72)  BP(mean): 78 (15 Mar 2023 08:00) (77 - 89)  ABP: --  ABP(mean): --  RR: 17 (15 Mar 2023 08:40) (16 - 18)  SpO2: 98% (15 Mar 2023 08:40) (91% - 100%)    O2 Parameters below as of 15 Mar 2023 09:00  Patient On (Oxygen Delivery Method): tracheostomy collar            EXAM:     Skylar Coma Scale: 15    General: normocephalic, laying in bed, trach to vent, in no distress  Neuro     MS: A/Ox3 (to 'hospital' and appropriate month/year), cooperative with examination, no neglect, comprehension intact,    CN: PERRL, EOMI and no ptosis bilaterally, face symmetric, hearing grossly intact    Mot: bulk normal, tone normal, power 5/5 in bilateral upper and 5/5 in bilateral lower ext    Sens: intact to noxious stimuli in bilateral upper and lower ext  HEENT: tracheostomy to ventilator  Chest: nonlabored respirations, no adventitious lung sounds bilaterally, heart regular rate/rhythm, present S1/S2, no murmurs or rubs  Abdomen: nondistended, soft and nontender without peritoneal signs, normoactive bowel sounds  Extremities: no clubbing, well-perfused, no edema    LABORATORY DATA:                          9.1    7.59  )-----------( 115      ( 15 Mar 2023 04:59 )             27.4     03-15    128<L>  |  93<L>  |  17  ----------------------------<  230<H>  3.8   |  28  |  0.67    Ca    8.6      15 Mar 2023 06:02  Phos  3.1     03-15  Mg     1.6     03-15        MEDICATIONS  (STANDING):  aMIOdarone    Tablet 400 milliGRAM(s) Oral two times a day  atorvastatin 20 milliGRAM(s) Oral at bedtime  cefepime   IVPB 2000 milliGRAM(s) IV Intermittent every 8 hours  chlorhexidine 0.12% Liquid 15 milliLiter(s) Oral Mucosa every 12 hours  chlorhexidine 2% Cloths 1 Application(s) Topical <User Schedule>  enoxaparin Injectable 70 milliGRAM(s) SubCutaneous every 12 hours  escitalopram 5 milliGRAM(s) Oral daily  fluticasone propionate 50 MICROgram(s)/spray Nasal Spray 1 Spray(s) Both Nostrils two times a day  insulin glargine Injectable (LANTUS) 10 Unit(s) SubCutaneous at bedtime  insulin regular  human corrective regimen sliding scale   SubCutaneous every 6 hours  insulin regular  human recombinant 6 Unit(s) SubCutaneous every 6 hours  levETIRAcetam 750 milliGRAM(s) Oral two times a day  magnesium oxide 400 milliGRAM(s) Oral every 12 hours  melatonin 5 milliGRAM(s) Oral at bedtime  metoprolol tartrate 25 milliGRAM(s) Oral every 12 hours  sodium chloride 2 Gram(s) Oral every 12 hours    MEDICATIONS  (PRN):  acetaminophen     Tablet .. 650 milliGRAM(s) Oral every 6 hours PRN Temp greater or equal to 38C (100.4F), Mild Pain (1 - 3)  acetylcysteine 20%  Inhalation 4 milliLiter(s) Inhalation every 6 hours PRN secretions  albuterol/ipratropium for Nebulization 3 milliLiter(s) Nebulizer every 6 hours PRN Shortness of Breath and/or Wheezing  ondansetron Injectable 4 milliGRAM(s) IV Push every 8 hours PRN Nausea and/or Vomiting  sodium chloride 0.9% lock flush 10 milliLiter(s) IV Push every 1 hour PRN Pre/post blood products, medications, blood draw, and to maintain line patency  sodium chloride 3%  Inhalation 4 milliLiter(s) Inhalation every 6 hours PRN secretions      ***********************************************  ASSESSMENT AND PLAN  ***********************************************    #S/p cerebral angio for IA avastin  #Acute HF 2/2 stress cardiomyopathy  #Acute T1 respiratory failure requiring mechanical ventilation  #Supraventricular tachycardia  #Diarrhea  #Metabolic acidosis, DKA - resolved  #Chronic hyponatremia  #Thrombocytopenia likely secondary to avastin  #R. 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 sided weakness post operatively  PULM - trial of trach collar, SpO2 goal > 92%, supplemental O2 and pulm toileting as needed  CARDIO - BP goal NORMOTENSION, MAP > 65, EF = 20%, lopressor, amiodarone 400/400 to be discontinued, daily CXR, cardiology consultation, lasix prn  GI - bowel regimen, stool count, diet: TFs, PPI (home med)  /RENAL - monitor UOP/volume status, BUN/SCr  HEME - maintain Hb > 7.0, PLT > 10,000/20,000 if febrile/50,000 if bleeding, 100,000 if life threatening bleeding  ID - monitor for infectious s/s, fever curve, leukocytosis; CPM 2g q8 for VAP (MSSA/Pseudomonas/serratia)  ENDO - SGlu goal < 200, lantus/regular insulin, endocrine following

## 2023-03-15 NOTE — PROGRESS NOTE ADULT - SUBJECTIVE AND OBJECTIVE BOX
HPI:  67 y/o right handed male with pmhx of T2DM, HLD, JAGDISH, hyponatremia, tracheal stenosis s/p tracheostomy, and glioblastoma presents for cerebral angiogram with Avastin treatment.    Daughter states in January 2022, patient had onset of confusion, dizziness, and speech difficulty while in [place] and was diagnosed with brain mass. Pt evaluated upon return to the [place] at [facility] ED with CT head revealing left frontal brain mass. Pt underwent resection of left frontal enhancing tumor with GLEOLAN placed 1/27/2022. Path showed gliosarcoma, WHO grade IV, IDH wild-type, EGFR non amplified, MGMT promotor methylated. Pt was discharged to rehab and underwent radiation treatment and Temodar chemotherapy (completed 12/22). MRI 12/22 showed recurrence of brain mass and pt presents today referred by Dr. Mackenzie for trial participation cerebral angiogram with Avastin treatment.    Pt complains of dizziness. Denies headache, nausea, vomiting, weakness, numbness, chest pain, dyspnea.   (07 Mar 2023 12:30)    OVERNIGHT EVENTS:    Hospital Course:     Vital Signs Last 24 Hrs  T(C): 36.6 (14 Mar 2023 21:59), Max: 37.6 (14 Mar 2023 08:58)  T(F): 97.9 (14 Mar 2023 21:59), Max: 99.7 (14 Mar 2023 08:58)  HR: 88 (14 Mar 2023 23:00) (78 - 109)  BP: 112/71 (14 Mar 2023 23:00) (99/63 - 120/79)  BP(mean): 86 (14 Mar 2023 23:00) (77 - 93)  RR: 18 (14 Mar 2023 23:00) (16 - 18)  SpO2: 96% (14 Mar 2023 23:00) (91% - 100%)    Parameters below as of 14 Mar 2023 23:00  Patient On (Oxygen Delivery Method): tracheostomy collar  O2 Flow (L/min): 10      I&O's Summary    13 Mar 2023 07:01  -  14 Mar 2023 07:00  --------------------------------------------------------  IN: 2205 mL / OUT: 900 mL / NET: 1305 mL    14 Mar 2023 07:01  -  15 Mar 2023 00:02  --------------------------------------------------------  IN: 1265 mL / OUT: 650 mL / NET: 615 mL        PHYSICAL EXAM:      TUBES/LINES:  [] Campuzano  [] Lumbar Drain  [] Wound Drains  [] Others      DIET:  [] NPO  [] Mechanical  [] Tube feeds    LABS:                        9.5    7.16  )-----------( 122      ( 14 Mar 2023 05:25 )             28.0     03-14    131<L>  |  97  |  18  ----------------------------<  177<H>  4.1   |  26  |  0.60    Ca    8.4      14 Mar 2023 05:25  Phos  2.6     03-14  Mg     1.9     03-14    TPro  5.4<L>  /  Alb  2.6<L>  /  TBili  0.9  /  DBili  x   /  AST  29  /  ALT  41  /  AlkPhos  72  03-13            CAPILLARY BLOOD GLUCOSE      POCT Blood Glucose.: 89 mg/dL (14 Mar 2023 23:04)  POCT Blood Glucose.: 217 mg/dL (14 Mar 2023 16:32)  POCT Blood Glucose.: 80 mg/dL (14 Mar 2023 10:46)  POCT Blood Glucose.: 193 mg/dL (14 Mar 2023 06:06)      Drug Levels: [] N/A    CSF Analysis: [] N/A      Allergies    amoxicillin (Rash)    Intolerances      MEDICATIONS:  Antibiotics:  cefepime   IVPB 2000 milliGRAM(s) IV Intermittent every 8 hours    Neuro:  acetaminophen     Tablet .. 650 milliGRAM(s) Oral every 6 hours PRN  escitalopram 5 milliGRAM(s) Oral daily  levETIRAcetam 750 milliGRAM(s) Oral two times a day  ondansetron Injectable 4 milliGRAM(s) IV Push every 8 hours PRN    Anticoagulation:  enoxaparin Injectable 70 milliGRAM(s) SubCutaneous every 12 hours    OTHER:  acetylcysteine 20%  Inhalation 4 milliLiter(s) Inhalation every 6 hours PRN  albuterol/ipratropium for Nebulization 3 milliLiter(s) Nebulizer every 6 hours PRN  aMIOdarone    Tablet 400 milliGRAM(s) Oral two times a day  atorvastatin 20 milliGRAM(s) Oral at bedtime  chlorhexidine 0.12% Liquid 15 milliLiter(s) Oral Mucosa every 12 hours  chlorhexidine 2% Cloths 1 Application(s) Topical <User Schedule>  fluticasone propionate 50 MICROgram(s)/spray Nasal Spray 1 Spray(s) Both Nostrils two times a day  insulin glargine Injectable (LANTUS) 10 Unit(s) SubCutaneous at bedtime  insulin regular  human corrective regimen sliding scale   SubCutaneous every 6 hours  insulin regular  human recombinant 6 Unit(s) SubCutaneous every 6 hours  metoprolol tartrate 25 milliGRAM(s) Oral every 12 hours  sodium chloride 3%  Inhalation 4 milliLiter(s) Inhalation every 6 hours PRN  sucralfate 1 Gram(s) Oral every 12 hours    IVF:    CULTURES:  Culture Results:   No growth at 2 days. (03-12 @ 12:22)  Culture Results:   No growth at 2 days. (03-12 @ 12:22)    RADIOLOGY & ADDITIONAL TESTS:      ASSESSMENT:  68y Male s/p    C71.9    Abnormal electrocardiogram [ECG] [EKG]    Allergy, unspecified, initial encounter    Anemia, unspecified    Benign prostatic hyperplasia without lower urinary tract symptoms    Calculus of kidney    Cough, unspecified    Depression, unspecified    ENCOUNTER FOR ATTENT    Encounter for general adult medical examination without abnormal findings    Encounter for immunization    Encounter for immunization safety counseling    Encounter for other preprocedural examination    Encounter for screening for malignant neoplasm of prostate    Encounter for screening for other metabolic disorders    Encounter for screening for other viral diseases    ESSENTIAL (PRIMARY)    Gastro-esophageal reflux disease without esophagitis    Generalized anxiety disorder    History of Glioblastoma    Hyperlipidemia, unspecified    Hypo-osmolality and hyponatremia    Hypotension, unspecified    Iron deficiency    Iron deficiency anemia, unspecified    LONG TERM (CURRENT)    LONG TERM (CURRENT)    LONG TERM (CURRENT)    Malignant neoplasm of brain, unspecified    Nausea with vomiting, unspecified    Other constipation    Other fatigue    Other injury of unspecified body region, initial encounter    OTHER SPECIFIED DISE    Personal history of malignant neoplasm, unspecified    Personal history of other diseases of the nervous system and sense organs    Personal history of other endocrine, nutritional and metabolic disease    Personal history of other mental and behavioral disorders    Personal history of transient ischemic attack (TIA), and cerebral infarction without residual deficits    POSTPROCEDURAL SUBGL    Pruritus, unspecified    Shortness of breath    Tachycardia, unspecified    Thrombocytopenia, unspecified    Unspecified abdominal pain    Unspecified visual disturbance    Urinary calculus, unspecified    Vitamin D deficiency, unspecified    No pertinent family history in first degree relatives    FH: diabetes mellitus (Father, Mother)    FH: hyperlipidemia (Father)    FH: hypertension (Father, Mother)    Handoff    No pertinent past medical history    Diabetes mellitus    Hypertension    Glioblastoma    Type 2 diabetes mellitus    Hyperlipidemia    Iron deficiency anemia    Nephrolithiasis    Thrombocytopenia    Hyponatremia    BPH (benign prostatic hyperplasia)    No pertinent past medical history    GBM (glioblastoma multiforme)    GBM (glioblastoma multiforme)    Acute respiratory failure with hypoxia    Tracheostomy status    Acute hyponatremia    Angiogram, carotid and cerebral arteries    No significant past surgical history    No significant past surgical history    S/P craniotomy    H/O tracheostomy    No significant past surgical history    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        PLAN:      Assessment:  Present when checked    []  GCS  E   V  M     Heart Failure: []Acute, [] acute on chronic , []chronic  Heart Failure:  [] Diastolic (HFpEF), [] Systolic (HFrEF), []Combined (HFpEF and HFrEF), [] RHF, [] Pulm HTN, [] Other    [] EUGENIA, [] ATN, [] AIN, [] other  [] CKD1, [] CKD2, [] CKD 3, [] CKD 4, [] CKD 5, []ESRD    Encephalopathy: [] Metabolic, [] Hepatic, [] toxic, [] Neurological, [] Other    Abnormal Nurtitional Status: [] malnurtition (see nutrition note), [ ]underweight: BMI < 19, [] morbid obesity: BMI >40, [] Cachexia    [] Sepsis  [] hypovolemic shock,[] cardiogenic shock, [] hemorrhagic shock, [] neuogenic shock  [] Acute Respiratory Failure  []Cerebral edema, [] Brain compression/ herniation,   [] Functional quadriplegia  [] Acute blood loss anemia

## 2023-03-15 NOTE — PROGRESS NOTE ADULT - SUBJECTIVE AND OBJECTIVE BOX
NSCU ATTENDING -- ADDITIONAL PROGRESS NOTE    Nighttime rounds were performed -- please refer to earlier Progress Note for HPI details.    Trach cuff deflated. Speaking valve trialed today.    ICU Vital Signs Last 24 Hrs  T(C): 36.8 (15 Mar 2023 17:40), Max: 37.1 (15 Mar 2023 01:18)  T(F): 98.3 (15 Mar 2023 17:40), Max: 98.8 (15 Mar 2023 01:18)  HR: 82 (15 Mar 2023 17:00) (70 - 89)  BP: 105/64 (15 Mar 2023 17:00) (99/66 - 115/72)  BP(mean): 80 (15 Mar 2023 17:00) (75 - 89)  RR: 18 (15 Mar 2023 17:00) (16 - 18)  SpO2: 97% (15 Mar 2023 17:00) (91% - 100%)      03-14-23 @ 07:01  -  03-15-23 @ 07:00  --------------------------------------------------------  IN: 1820 mL / OUT: 1830 mL / NET: -10 mL    03-15-23 @ 07:01  -  03-15-23 @ 18:40  --------------------------------------------------------  IN: 735 mL / OUT: 1000 mL / NET: -265 mL      Mode: standby    MEDICATIONS   acetaminophen     Tablet .. 650 milliGRAM(s) Oral every 6 hours PRN  acetylcysteine 20%  Inhalation 4 milliLiter(s) Inhalation every 6 hours PRN  albuterol/ipratropium for Nebulization 3 milliLiter(s) Nebulizer every 6 hours PRN  aMIOdarone    Tablet 400 milliGRAM(s) Oral two times a day  atorvastatin 20 milliGRAM(s) Oral at bedtime  cefepime   IVPB 2000 milliGRAM(s) IV Intermittent every 8 hours  chlorhexidine 0.12% Liquid 15 milliLiter(s) Oral Mucosa every 12 hours  chlorhexidine 2% Cloths 1 Application(s) Topical <User Schedule>  enoxaparin Injectable 70 milliGRAM(s) SubCutaneous every 12 hours  escitalopram 5 milliGRAM(s) Oral daily  fluticasone propionate 50 MICROgram(s)/spray Nasal Spray 1 Spray(s) Both Nostrils two times a day  insulin glargine Injectable (LANTUS) 10 Unit(s) SubCutaneous at bedtime  insulin regular  human corrective regimen sliding scale   SubCutaneous every 6 hours  insulin regular  human recombinant 6 Unit(s) SubCutaneous every 6 hours  levETIRAcetam 750 milliGRAM(s) Oral two times a day  melatonin 5 milliGRAM(s) Oral at bedtime  metoprolol tartrate 25 milliGRAM(s) Oral every 12 hours  ondansetron Injectable 4 milliGRAM(s) IV Push every 8 hours PRN  sodium chloride 2 Gram(s) Oral every 12 hours  sodium chloride 0.9% lock flush 10 milliLiter(s) IV Push every 1 hour PRN  sodium chloride 3%  Inhalation 4 milliLiter(s) Inhalation every 6 hours PRN     LABS:                     9.1    7.59  )-----------( 115      ( 15 Mar 2023 04:59 )             27.4     03-15    128<L>  |  93<L>  |  17  ----------------------------<  230<H>  3.8   |  28  |  0.67    Ca    8.6      15 Mar 2023 06:02  Phos  3.1     03-15  Mg     1.6     03-15      ASSESSMENT: GBM, s/p IA avastin; complicated by cardiogenic shock, from stress induced cardiomyopathy, pulm edema.       PLAN:   Neuro: Neurochecks Q4 hr   Keppra 750mg Q12 hr   CV: MAP >65. Continue metoprolol 25 mg bid and amiodarone per cardiology, currently in sinus rhythm.  TTE EF 20%. Diuresce prn.   PULM: Trached- on trach collar. Continue to wean. Aggressive chest PT. RTC nebs. Pulm following.   GI: On TFs, at goal. LBM- 3/14  Renal: Monitor urine output. Monitor BMPs.  ID: VAP- pseudomonas and staph; on cefepime.   Endo: DM, target sugar 140-180. Lantus 10u, regular 6 u  Heme: R IM calf DVT. On lovenox 70mg bid. Anti Xa. LE dopplers no propagation. Chronic.  Thrombocytopenia- improving, heme on consult; possibly related to avastin.     35 critical care time as patient is at risk for brain herniation, seizures, ICH  NSCU ATTENDING -- ADDITIONAL PROGRESS NOTE    Nighttime rounds were performed -- please refer to earlier Progress Note for HPI details.    Trach cuff deflated. Speaking valve trialed today.     ICU Vital Signs Last 24 Hrs  T(C): 36.8 (15 Mar 2023 17:40), Max: 37.1 (15 Mar 2023 01:18)  T(F): 98.3 (15 Mar 2023 17:40), Max: 98.8 (15 Mar 2023 01:18)  HR: 82 (15 Mar 2023 17:00) (70 - 89)  BP: 105/64 (15 Mar 2023 17:00) (99/66 - 115/72)  BP(mean): 80 (15 Mar 2023 17:00) (75 - 89)  RR: 18 (15 Mar 2023 17:00) (16 - 18)  SpO2: 97% (15 Mar 2023 17:00) (91% - 100%)      03-14-23 @ 07:01  -  03-15-23 @ 07:00  --------------------------------------------------------  IN: 1820 mL / OUT: 1830 mL / NET: -10 mL    03-15-23 @ 07:01  -  03-15-23 @ 18:40  --------------------------------------------------------  IN: 735 mL / OUT: 1000 mL / NET: -265 mL      Mode: standby    MEDICATIONS   acetaminophen     Tablet .. 650 milliGRAM(s) Oral every 6 hours PRN  acetylcysteine 20%  Inhalation 4 milliLiter(s) Inhalation every 6 hours PRN  albuterol/ipratropium for Nebulization 3 milliLiter(s) Nebulizer every 6 hours PRN  aMIOdarone    Tablet 400 milliGRAM(s) Oral two times a day  atorvastatin 20 milliGRAM(s) Oral at bedtime  cefepime   IVPB 2000 milliGRAM(s) IV Intermittent every 8 hours  chlorhexidine 0.12% Liquid 15 milliLiter(s) Oral Mucosa every 12 hours  chlorhexidine 2% Cloths 1 Application(s) Topical <User Schedule>  enoxaparin Injectable 70 milliGRAM(s) SubCutaneous every 12 hours  escitalopram 5 milliGRAM(s) Oral daily  fluticasone propionate 50 MICROgram(s)/spray Nasal Spray 1 Spray(s) Both Nostrils two times a day  insulin glargine Injectable (LANTUS) 10 Unit(s) SubCutaneous at bedtime  insulin regular  human corrective regimen sliding scale   SubCutaneous every 6 hours  insulin regular  human recombinant 6 Unit(s) SubCutaneous every 6 hours  levETIRAcetam 750 milliGRAM(s) Oral two times a day  melatonin 5 milliGRAM(s) Oral at bedtime  metoprolol tartrate 25 milliGRAM(s) Oral every 12 hours  ondansetron Injectable 4 milliGRAM(s) IV Push every 8 hours PRN  sodium chloride 2 Gram(s) Oral every 12 hours  sodium chloride 0.9% lock flush 10 milliLiter(s) IV Push every 1 hour PRN  sodium chloride 3%  Inhalation 4 milliLiter(s) Inhalation every 6 hours PRN     LABS:                     9.1    7.59  )-----------( 115      ( 15 Mar 2023 04:59 )             27.4     03-15    128<L>  |  93<L>  |  17  ----------------------------<  230<H>  3.8   |  28  |  0.67    Ca    8.6      15 Mar 2023 06:02  Phos  3.1     03-15  Mg     1.6     03-15      ASSESSMENT: GBM, s/p IA avastin; complicated by cardiogenic shock, from stress induced cardiomyopathy, pulm edema.       PLAN:   Neuro: Neurochecks Q4 hr   Keppra 750mg Q12 hr   CV: MAP >65. Continue metoprolol 25 mg bid and amiodarone per cardiology, currently in sinus rhythm.  TTE EF 20%. Diuresce prn.   PULM: Trached- on trach collar. Continue to wean. Aggressive chest PT. RTC nebs. Pulm following. ENT for trach exchange  GI: On TFs, at goal. LBM- 3/15  Renal: Monitor urine output. Monitor BMPs. Monitor BMPs. Hold salt tabs for now.   ID: VAP- pseudomonas and staph; on cefepime.   Endo: DM, target sugar 140-180. Lantus 10u, regular 6 u  Heme: R IM calf DVT. On lovenox 70mg bid. Anti Xa- 0.48; repeat. LE dopplers no propagation. Chronic.  Thrombocytopenia- improving, heme on consult; possibly related to avastin.     Not critically ill

## 2023-03-15 NOTE — SPEAKING VALVE EVALUATION - COMMENTS
Received awake. Family at bedside & reports Pt was tolerating PMV prior to admission and tolerating PO diet.

## 2023-03-15 NOTE — PROGRESS NOTE ADULT - SUBJECTIVE AND OBJECTIVE BOX
HPI:  67 y/o right handed male with pmhx of T2DM, HLD, JAGDISH, hyponatremia, tracheal stenosis s/p tracheostomy, and glioblastoma presents for cerebral angiogram with Avastin treatment.    Daughter states in January 2022, patient had onset of confusion, dizziness, and speech difficulty while in [place] and was diagnosed with brain mass. Pt evaluated upon return [place] the [place] at [facility] ED with CT head revealing left frontal brain mass. Pt underwent resection of left frontal enhancing tumor with GLEOLAN placed 1/27/2022. Path showed gliosarcoma, WHO grade IV, IDH wild-type, EGFR non amplified, MGMT promotor methylated. Pt was discharged to rehab and underwent radiation treatment and Temodar chemotherapy (completed 12/22). MRI 12/22 showed recurrence of brain mass and pt presents today referred by Dr. Mackenzie for trial participation cerebral angiogram with Avastin treatment.    Pt complains of dizziness. Denies headache, nausea, vomiting, weakness, numbness, chest pain, dyspnea.   (07 Mar 2023 12:30)    OVERNIGHT EVENTS: Overnight patient was given 40 mg IV lasix for b/l congestion viewed on POCUS exam.     Hospital Course:   3/7: POD0 cerebral angiogram IA avastin. post op in cath lab hypertensive, +flash pulmonary edema w/ desaturation, given diuretics. on cpap   3/8: POD1. o/n new global aphasia, right side w/d. CTH/CTA/CTP performed. ABG o/n with metabolic acidosis, lactate 7.9 and hyperglycemic 320s. started on insulin gtt, 1L NS bolus and NS@200cc/hr. desatting on cpap, placed on full vent support. propofol for sedation. in SVT, resolved with 5 lopresor. fluids stopped due to worsening CXR. repeat lactate 12, given 1amp bicarb then started on bicarb gtt. POCUS this am with hypokinetic left ventricle, pending echo. Cards/nephro/ endo consulted for further recs. Insulin gtt dc'd. VEEG monitoring. S/p 3%Na.  SQL started tonight. S/p 10 NPH after FS of 166. steven gtt. S/p 40 IV lasix. Troponin downtrending 0.32 to 0.18. EEG negative for seizures. Pancultured spiked 101.5. UA negative. Bicarb gtt dc'd. TTE EF15-20%, akinetic apex and midsegments suggestive of takotsubo  3/9: POD2. ANA LILIA o/n neuro stable. remains sedated on propofol, on levo gtt. Propofol dc'd. s/p lasix 40 mg IV x 1. CXR L effusion improved. Started lantus 10u at bedtime per endocrine. Campuzano removed, f/u TOV.    3/10: POD3 ANA LILIA overnight. Neuro exam stable. multiple bouts of diarrhea overnight and AM. Tube feeds held. s/p albumin 250 cc +  cc for tachycardia in the setting of negative fluid status. Tolerating CPAP. Tolerating trach collar x 3 hours, switched back to CPAP overnight. Off levo gtt.   3/11: POD4 Episode of O2 desaturation to low 90s while on 40% FiO2. FiO2 increased to 60% briefly with improvement in O2, good breath sounds throughout, Pt denies SOB and CXR stable. Neuro exam stable. CXR with worsening congestion, given Lasix 40mg IV, on full vent support. Heme consulted for thrombocytopenia. Fever 101F. Episode of SVT with HR to 180s and hypotensive to 80s. Started on Steven, EKG and IV tylenol. Cards fellow called to bedside. Lopressor 5mg IV given with good resolution. Vital signs normalized. Patient remained neuro intact. Started on maintenance lopressor 12.5bid and amio gtt per cards. Given another Lasix 40mg IV at 6pm. Added regular insulin 2 units q 6 per endo.  3/12: POD5 Pt remains on full vent support. Neuro exam stable. Spiked 101.7F fever, pan cultured. Started on vanco/cefepine for empiric coverage. Lasix 40mg IV given in afternoon. Started on IV iron x 3 days, per heme. Regular insulin increased to 6q6, per endo. Amio PO, increased lopressor to 25bid, per cards.  3/13: POD 6. ANA LILIA overnight, exam stable, c-diff sent for multiple loose BMs/febrile/off bowel regimen since 3/7, negative. low grade fever overnight 100.2 pan cx yesterday for fever 101.7. C. diff negative. Cefepime x 7 d for psudomonal PNA. Repeat echo done, EF 20%. Osmolite changed to Vital per endo recs for loose stools.   3/14: POD7. ANA LILIA o/n neuro stable. started on full dose SQL for DVT. 20mg lasix given for diuresis. LE dopplers showing chronic R IM calf DVT. Tolerating trach collar. Restarted his home lexapro for agitation. Increased regular insulin to 6 units. Pocus w/ several b-lines given 40 mg lasix, repeat chest xray in am. Regular insulin held for sugar 89.     Vital Signs Last 24 Hrs  T(C): 36.6 (14 Mar 2023 21:59), Max: 37.6 (14 Mar 2023 08:58)  T(F): 97.9 (14 Mar 2023 21:59), Max: 99.7 (14 Mar 2023 08:58)  HR: 88 (14 Mar 2023 23:00) (78 - 109)  BP: 112/71 (14 Mar 2023 23:00) (99/63 - 120/79)  BP(mean): 86 (14 Mar 2023 23:00) (77 - 93)  RR: 18 (14 Mar 2023 23:00) (16 - 18)  SpO2: 96% (14 Mar 2023 23:00) (91% - 100%)    Parameters below as of 14 Mar 2023 23:00  Patient On (Oxygen Delivery Method): tracheostomy collar  O2 Flow (L/min): 10      I&O's Summary    13 Mar 2023 07:01  -  14 Mar 2023 07:00  --------------------------------------------------------  IN: 2205 mL / OUT: 900 mL / NET: 1305 mL    14 Mar 2023 07:01  -  15 Mar 2023 00:04  --------------------------------------------------------  IN: 1265 mL / OUT: 650 mL / NET: 615 mL        PHYSICAL EXAM:  General: patient seen laying supine in bed in NAD  Neuro: AAOx3 (mouths words apropriately), FC, OE spontaneously, face symmetric, no pronator drift, strength 5/5 b/l UE and LE  HEENT: PERRL, EOMI +NGT  Pulmonary: chest rise symmetric  Abdomen: soft, nontender, nondistended  Ext: perfusing well  Skin: warm, dry  Wound: Groin c/d/i + steri strips      TUBES/LINES:  [] Campuzano  [] Lumbar Drain  [] Wound Drains  [] Others      DIET:  [] NPO  [] Mechanical  [X] Tube feeds    LABS:                        9.5    7.16  )-----------( 122      ( 14 Mar 2023 05:25 )             28.0     03-14    131<L>  |  97  |  18  ----------------------------<  177<H>  4.1   |  26  |  0.60    Ca    8.4      14 Mar 2023 05:25  Phos  2.6     03-14  Mg     1.9     03-14    TPro  5.4<L>  /  Alb  2.6<L>  /  TBili  0.9  /  DBili  x   /  AST  29  /  ALT  41  /  AlkPhos  72  03-13            CAPILLARY BLOOD GLUCOSE      POCT Blood Glucose.: 89 mg/dL (14 Mar 2023 23:04)  POCT Blood Glucose.: 217 mg/dL (14 Mar 2023 16:32)  POCT Blood Glucose.: 80 mg/dL (14 Mar 2023 10:46)  POCT Blood Glucose.: 193 mg/dL (14 Mar 2023 06:06)      Drug Levels: [] N/A    CSF Analysis: [] N/A      Allergies    amoxicillin (Rash)    Intolerances      MEDICATIONS:  Antibiotics:  cefepime   IVPB 2000 milliGRAM(s) IV Intermittent every 8 hours    Neuro:  acetaminophen     Tablet .. 650 milliGRAM(s) Oral every 6 hours PRN  escitalopram 5 milliGRAM(s) Oral daily  levETIRAcetam 750 milliGRAM(s) Oral two times a day  ondansetron Injectable 4 milliGRAM(s) IV Push every 8 hours PRN    Anticoagulation:  enoxaparin Injectable 70 milliGRAM(s) SubCutaneous every 12 hours    OTHER:  acetylcysteine 20%  Inhalation 4 milliLiter(s) Inhalation every 6 hours PRN  albuterol/ipratropium for Nebulization 3 milliLiter(s) Nebulizer every 6 hours PRN  aMIOdarone    Tablet 400 milliGRAM(s) Oral two times a day  atorvastatin 20 milliGRAM(s) Oral at bedtime  chlorhexidine 0.12% Liquid 15 milliLiter(s) Oral Mucosa every 12 hours  chlorhexidine 2% Cloths 1 Application(s) Topical <User Schedule>  fluticasone propionate 50 MICROgram(s)/spray Nasal Spray 1 Spray(s) Both Nostrils two times a day  insulin glargine Injectable (LANTUS) 10 Unit(s) SubCutaneous at bedtime  insulin regular  human corrective regimen sliding scale   SubCutaneous every 6 hours  insulin regular  human recombinant 6 Unit(s) SubCutaneous every 6 hours  metoprolol tartrate 25 milliGRAM(s) Oral every 12 hours  sodium chloride 3%  Inhalation 4 milliLiter(s) Inhalation every 6 hours PRN  sucralfate 1 Gram(s) Oral every 12 hours    IVF:    CULTURES:  Culture Results:   No growth at 2 days. (03-12 @ 12:22)  Culture Results:   No growth at 2 days. (03-12 @ 12:22)    RADIOLOGY & ADDITIONAL TESTS:  < from: US Duplex Venous Lower Ext Complete, Bilateral (03.14.23 @ 11:25) >  IMPRESSION:    1. RIGHT intramuscular calf DVT similar to 3/8/23 with slight interval   decrease in caliber of vessel lumen suggestive of chronicity.    2. No new DVT.      < end of copied text >      ASSESSMENT:  67 y/o right handed male with pmhx of T2DM, HLD, JAGDISH, hyponatremia, tracheal stenosis s/p tracheostomy, and glioblastoma now s/p cerebral angiogram with Avastin treatment (3/7/23). c/b cardiogenic shock, cardiomyopathy, improving.     C71.9    Abnormal electrocardiogram [ECG] [EKG]    Allergy, unspecified, initial encounter    Anemia, unspecified    Benign prostatic hyperplasia without lower urinary tract symptoms    Calculus of kidney    Cough, unspecified    Depression, unspecified    ENCOUNTER FOR ATTENT    Encounter for general adult medical examination without abnormal findings    Encounter for immunization    Encounter for immunization safety counseling    Encounter for other preprocedural examination    Encounter for screening for malignant neoplasm of prostate    Encounter for screening for other metabolic disorders    Encounter for screening for other viral diseases    ESSENTIAL (PRIMARY)    Gastro-esophageal reflux disease without esophagitis    Generalized anxiety disorder    History of Glioblastoma    Hyperlipidemia, unspecified    Hypo-osmolality and hyponatremia    Hypotension, unspecified    Iron deficiency    Iron deficiency anemia, unspecified    LONG TERM (CURRENT)    LONG TERM (CURRENT)    LONG TERM (CURRENT)    Malignant neoplasm of brain, unspecified    Nausea with vomiting, unspecified    Other constipation    Other fatigue    Other injury of unspecified body region, initial encounter    OTHER SPECIFIED DISE    Personal history of malignant neoplasm, unspecified    Personal history of other diseases of the nervous system and sense organs    Personal history of other endocrine, nutritional and metabolic disease    Personal history of other mental and behavioral disorders    Personal history of transient ischemic attack (TIA), and cerebral infarction without residual deficits    POSTPROCEDURAL SUBGL    Pruritus, unspecified    Shortness of breath    Tachycardia, unspecified    Thrombocytopenia, unspecified    Unspecified abdominal pain    Unspecified visual disturbance    Urinary calculus, unspecified    Vitamin D deficiency, unspecified    No pertinent family history in first degree relatives    FH: diabetes mellitus (Father, Mother)    FH: hyperlipidemia (Father)    FH: hypertension (Father, Mother)    Handoff    No pertinent past medical history    Diabetes mellitus    Hypertension    Glioblastoma    Type 2 diabetes mellitus    Hyperlipidemia    Iron deficiency anemia    Nephrolithiasis    Thrombocytopenia    Hyponatremia    BPH (benign prostatic hyperplasia)    No pertinent past medical history    GBM (glioblastoma multiforme)    GBM (glioblastoma multiforme)    Acute respiratory failure with hypoxia    Tracheostomy status    Acute hyponatremia    Angiogram, carotid and cerebral arteries    No significant past surgical history    No significant past surgical history    S/P craniotomy    H/O tracheostomy    No significant past surgical history    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        PLAN:  Neuro   - Vitals/neuro q4   - CTA/CTP/repeat CTH negative   - Pain control   - Cont home Keppra 750 BID   - MRI w/wo on stepdown   - continue home med: lexapro 5mg daily     Cards  - Goal MAP > 65   - TTE EF 15-20%, akinetic apex and midsegments suggestive of takotsu   - Repeat echo 3/13, EF 20%   - Per cards: started on lopressor 25 BID, amio  mg BID     Pulm  - + Trach collar   - duonebs, mucomyst, saline nebs for secretions prn   - Pulm following     GI  - NGT - Vital 1.5 @65 + banatrol d/t diarrhea   - Bowel regimen held for loose stool, + banatrol, last BM 3/13  - sucralfate    RENAL:   - Chronic hyponatremia   - Voiding   - s/p lasix     HEME:  - DVT ppx: SQL 70 bid, SCD L side only  - LE dopplers + R IM calf DVT, 3/14 chronic R IM calf DVT  - Heme consulted for thrombocytopenia- recommend IV iron x 3 days    ID:   - Last panculture 3/12, Sputum psudomonas + staph aureus, +serratia marcesans   - Cefepime 2 g q 8 (3/12 - 3/18) x 7 d   - C.diff negative      ENDO:   - DKA resolved    - DM: A1C 5.8, coninue ISS   - Lantus 10u at bedtime, Reg insulin 6 u q6h    DISPO:   - ICU status, full code, dispo pending     D/W Dr. Ho and Dr. Huggins      Assessment:  Present when checked    []  GCS  E   V  M     Heart Failure: []Acute, [] acute on chronic , []chronic  Heart Failure:  [] Diastolic (HFpEF), [] Systolic (HFrEF), []Combined (HFpEF and HFrEF), [] RHF, [] Pulm HTN, [] Other    [] EUGENIA, [] ATN, [] AIN, [] other  [] CKD1, [] CKD2, [] CKD 3, [] CKD 4, [] CKD 5, []ESRD    Encephalopathy: [] Metabolic, [] Hepatic, [] toxic, [] Neurological, [] Other    Abnormal Nurtitional Status: [] malnurtition (see nutrition note), [ ]underweight: BMI < 19, [] morbid obesity: BMI >40, [] Cachexia    [] Sepsis  [] hypovolemic shock,[] cardiogenic shock, [] hemorrhagic shock, [] neuogenic shock  [] Acute Respiratory Failure  []Cerebral edema, [] Brain compression/ herniation,   [] Functional quadriplegia  [] Acute blood loss anemia   HPI:  69 y/o right handed male with pmhx of T2DM, HLD, JAGDISH, hyponatremia, tracheal stenosis s/p tracheostomy, and glioblastoma presents for cerebral angiogram with Avastin treatment.    Daughter states in January 2022, patient had onset of confusion, dizziness, and speech difficulty while in [place] and was diagnosed with brain mass. Pt evaluated upon return to the [place] at [facility] ED with CT head revealing left frontal brain mass. Pt underwent resection of left frontal enhancing tumor with GLEOLAN placed 1/27/2022. Path showed gliosarcoma, WHO grade IV, IDH wild-type, EGFR non amplified, MGMT promotor methylated. Pt was discharged to rehab and underwent radiation treatment and Temodar chemotherapy (completed 12/22). MRI 12/22 showed recurrence of brain mass and pt presents today referred by Dr. Mackenzie for trial participation cerebral angiogram with Avastin treatment.    Pt complains of dizziness. Denies headache, nausea, vomiting, weakness, numbness, chest pain, dyspnea.   (07 Mar 2023 12:30)    OVERNIGHT EVENTS: Overnight patient was given 40 mg IV lasix for b/l congestion viewed on POCUS exam.     Hospital Course:   3/7: POD0 cerebral angiogram IA avastin. post op in cath lab hypertensive, +flash pulmonary edema w/ desaturation, given diuretics. on cpap   3/8: POD1. o/n new global aphasia, right side w/d. CTH/CTA/CTP performed. ABG o/n with metabolic acidosis, lactate 7.9 and hyperglycemic 320s. started on insulin gtt, 1L NS bolus and NS@200cc/hr. desatting on cpap, placed on full vent support. propofol for sedation. in SVT, resolved with 5 lopresor. fluids stopped due to worsening CXR. repeat lactate 12, given 1amp bicarb then started on bicarb gtt. POCUS this am with hypokinetic left ventricle, pending echo. Cards/nephro/ endo consulted for further recs. Insulin gtt dc'd. VEEG monitoring. S/p 3%Na.  SQL started tonight. S/p 10 NPH after FS of 166. steven gtt. S/p 40 IV lasix. Troponin downtrending 0.32 to 0.18. EEG negative for seizures. Pancultured spiked 101.5. UA negative. Bicarb gtt dc'd. TTE EF15-20%, akinetic apex and midsegments suggestive of takotsubo  3/9: POD2. ANA LILIA o/n neuro stable. remains sedated on propofol, on levo gtt. Propofol dc'd. s/p lasix 40 mg IV x 1. CXR L effusion improved. Started lantus 10u at bedtime per endocrine. Campuzano removed, f/u TOV.    3/10: POD3 ANA LILIA overnight. Neuro exam stable. multiple bouts of diarrhea overnight and AM. Tube feeds held. s/p albumin 250 cc +  cc for tachycardia in the setting of negative fluid status. Tolerating CPAP. Tolerating trach collar x 3 hours, switched back to CPAP overnight. Off levo gtt.   3/11: POD4 Episode of O2 desaturation to low 90s while on 40% FiO2. FiO2 increased to 60% briefly with improvement in O2, good breath sounds throughout, Pt denies SOB and CXR stable. Neuro exam stable. CXR with worsening congestion, given Lasix 40mg IV, on full vent support. Heme consulted for thrombocytopenia. Fever 101F. Episode of SVT with HR to 180s and hypotensive to 80s. Started on Steven, EKG and IV tylenol. Cards fellow called to bedside. Lopressor 5mg IV given with good resolution. Vital signs normalized. Patient remained neuro intact. Started on maintenance lopressor 12.5bid and amio gtt per cards. Given another Lasix 40mg IV at 6pm. Added regular insulin 2 units q 6 per endo.  3/12: POD5 Pt remains on full vent support. Neuro exam stable. Spiked 101.7F fever, pan cultured. Started on vanco/cefepine for empiric coverage. Lasix 40mg IV given in afternoon. Started on IV iron x 3 days, per heme. Regular insulin increased to 6q6, per endo. Amio PO, increased lopressor to 25bid, per cards.  3/13: POD 6. ANA LILIA overnight, exam stable, c-diff sent for multiple loose BMs/febrile/off bowel regimen since 3/7, negative. low grade fever overnight 100.2 pan cx yesterday for fever 101.7. C. diff negative. Cefepime x 7 d for psudomonal PNA. Repeat echo done, EF 20%. Osmolite changed to Vital per endo recs for loose stools.   3/14: POD7. ANA LILIA o/n neuro stable. started on full dose SQL for DVT. 20mg lasix given for diuresis. LE dopplers showing chronic R IM calf DVT. Tolerating trach collar. Restarted his home lexapro for agitation. Increased regular insulin to 6 units. Pocus w/ several b-lines given 40 mg lasix, repeat chest xray in am. Regular insulin held for sugar 89.   3/15: POD 8 IA avastin. Overnight Increased regular insulin to 6 units. Pocus w/ several b-lines given 40 mg lasix, repeat chest xray in am. Regular insulin held for sugar 89.        Vital Signs Last 24 Hrs  T(C): 36.6 (14 Mar 2023 21:59), Max: 37.6 (14 Mar 2023 08:58)  T(F): 97.9 (14 Mar 2023 21:59), Max: 99.7 (14 Mar 2023 08:58)  HR: 88 (14 Mar 2023 23:00) (78 - 109)  BP: 112/71 (14 Mar 2023 23:00) (99/63 - 120/79)  BP(mean): 86 (14 Mar 2023 23:00) (77 - 93)  RR: 18 (14 Mar 2023 23:00) (16 - 18)  SpO2: 96% (14 Mar 2023 23:00) (91% - 100%)    Parameters below as of 14 Mar 2023 23:00  Patient On (Oxygen Delivery Method): tracheostomy collar  O2 Flow (L/min): 10      I&O's Summary    13 Mar 2023 07:01  -  14 Mar 2023 07:00  --------------------------------------------------------  IN: 2205 mL / OUT: 900 mL / NET: 1305 mL    14 Mar 2023 07:01  -  15 Mar 2023 00:04  --------------------------------------------------------  IN: 1265 mL / OUT: 650 mL / NET: 615 mL        PHYSICAL EXAM:  General: patient seen laying supine in bed in NAD  Neuro: AAOx3 (mouths words apropriately), FC, OE spontaneously, face symmetric, no pronator drift, strength 5/5 b/l UE and LE  HEENT: PERRL, EOMI +NGT  Pulmonary: chest rise symmetric  Abdomen: soft, nontender, nondistended  Ext: perfusing well  Skin: warm, dry  Wound: Groin c/d/i + steri strips      TUBES/LINES:  [] Campuzano  [] Lumbar Drain  [] Wound Drains  [] Others      DIET:  [] NPO  [] Mechanical  [X] Tube feeds    LABS:                        9.5    7.16  )-----------( 122      ( 14 Mar 2023 05:25 )             28.0     03-14    131<L>  |  97  |  18  ----------------------------<  177<H>  4.1   |  26  |  0.60    Ca    8.4      14 Mar 2023 05:25  Phos  2.6     03-14  Mg     1.9     03-14    TPro  5.4<L>  /  Alb  2.6<L>  /  TBili  0.9  /  DBili  x   /  AST  29  /  ALT  41  /  AlkPhos  72  03-13            CAPILLARY BLOOD GLUCOSE      POCT Blood Glucose.: 89 mg/dL (14 Mar 2023 23:04)  POCT Blood Glucose.: 217 mg/dL (14 Mar 2023 16:32)  POCT Blood Glucose.: 80 mg/dL (14 Mar 2023 10:46)  POCT Blood Glucose.: 193 mg/dL (14 Mar 2023 06:06)      Drug Levels: [] N/A    CSF Analysis: [] N/A      Allergies    amoxicillin (Rash)    Intolerances      MEDICATIONS:  Antibiotics:  cefepime   IVPB 2000 milliGRAM(s) IV Intermittent every 8 hours    Neuro:  acetaminophen     Tablet .. 650 milliGRAM(s) Oral every 6 hours PRN  escitalopram 5 milliGRAM(s) Oral daily  levETIRAcetam 750 milliGRAM(s) Oral two times a day  ondansetron Injectable 4 milliGRAM(s) IV Push every 8 hours PRN    Anticoagulation:  enoxaparin Injectable 70 milliGRAM(s) SubCutaneous every 12 hours    OTHER:  acetylcysteine 20%  Inhalation 4 milliLiter(s) Inhalation every 6 hours PRN  albuterol/ipratropium for Nebulization 3 milliLiter(s) Nebulizer every 6 hours PRN  aMIOdarone    Tablet 400 milliGRAM(s) Oral two times a day  atorvastatin 20 milliGRAM(s) Oral at bedtime  chlorhexidine 0.12% Liquid 15 milliLiter(s) Oral Mucosa every 12 hours  chlorhexidine 2% Cloths 1 Application(s) Topical <User Schedule>  fluticasone propionate 50 MICROgram(s)/spray Nasal Spray 1 Spray(s) Both Nostrils two times a day  insulin glargine Injectable (LANTUS) 10 Unit(s) SubCutaneous at bedtime  insulin regular  human corrective regimen sliding scale   SubCutaneous every 6 hours  insulin regular  human recombinant 6 Unit(s) SubCutaneous every 6 hours  metoprolol tartrate 25 milliGRAM(s) Oral every 12 hours  sodium chloride 3%  Inhalation 4 milliLiter(s) Inhalation every 6 hours PRN  sucralfate 1 Gram(s) Oral every 12 hours    IVF:    CULTURES:  Culture Results:   No growth at 2 days. (03-12 @ 12:22)  Culture Results:   No growth at 2 days. (03-12 @ 12:22)    RADIOLOGY & ADDITIONAL TESTS:  < from: US Duplex Venous Lower Ext Complete, Bilateral (03.14.23 @ 11:25) >  IMPRESSION:    1. RIGHT intramuscular calf DVT similar to 3/8/23 with slight interval   decrease in caliber of vessel lumen suggestive of chronicity.    2. No new DVT.      < end of copied text >      ASSESSMENT:  69 y/o right handed male with pmhx of T2DM, HLD, JAGDISH, hyponatremia, tracheal stenosis s/p tracheostomy, and glioblastoma now s/p cerebral angiogram with Avastin treatment (3/7/23). c/b cardiogenic shock, cardiomyopathy, improving.     C71.9    Abnormal electrocardiogram [ECG] [EKG]    Allergy, unspecified, initial encounter    Anemia, unspecified    Benign prostatic hyperplasia without lower urinary tract symptoms    Calculus of kidney    Cough, unspecified    Depression, unspecified    ENCOUNTER FOR ATTENT    Encounter for general adult medical examination without abnormal findings    Encounter for immunization    Encounter for immunization safety counseling    Encounter for other preprocedural examination    Encounter for screening for malignant neoplasm of prostate    Encounter for screening for other metabolic disorders    Encounter for screening for other viral diseases    ESSENTIAL (PRIMARY)    Gastro-esophageal reflux disease without esophagitis    Generalized anxiety disorder    History of Glioblastoma    Hyperlipidemia, unspecified    Hypo-osmolality and hyponatremia    Hypotension, unspecified    Iron deficiency    Iron deficiency anemia, unspecified    LONG TERM (CURRENT)    LONG TERM (CURRENT)    LONG TERM (CURRENT)    Malignant neoplasm of brain, unspecified    Nausea with vomiting, unspecified    Other constipation    Other fatigue    Other injury of unspecified body region, initial encounter    OTHER SPECIFIED DISE    Personal history of malignant neoplasm, unspecified    Personal history of other diseases of the nervous system and sense organs    Personal history of other endocrine, nutritional and metabolic disease    Personal history of other mental and behavioral disorders    Personal history of transient ischemic attack (TIA), and cerebral infarction without residual deficits    POSTPROCEDURAL SUBGL    Pruritus, unspecified    Shortness of breath    Tachycardia, unspecified    Thrombocytopenia, unspecified    Unspecified abdominal pain    Unspecified visual disturbance    Urinary calculus, unspecified    Vitamin D deficiency, unspecified    No pertinent family history in first degree relatives    FH: diabetes mellitus (Father, Mother)    FH: hyperlipidemia (Father)    FH: hypertension (Father, Mother)    Handoff    No pertinent past medical history    Diabetes mellitus    Hypertension    Glioblastoma    Type 2 diabetes mellitus    Hyperlipidemia    Iron deficiency anemia    Nephrolithiasis    Thrombocytopenia    Hyponatremia    BPH (benign prostatic hyperplasia)    No pertinent past medical history    GBM (glioblastoma multiforme)    GBM (glioblastoma multiforme)    Acute respiratory failure with hypoxia    Tracheostomy status    Acute hyponatremia    Angiogram, carotid and cerebral arteries    No significant past surgical history    No significant past surgical history    S/P craniotomy    H/O tracheostomy    No significant past surgical history    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        PLAN:  Neuro   - Vitals/neuro q4   - CTA/CTP/repeat CTH negative   - Pain control   - Cont home Keppra 750 BID   - MRI w/wo on stepdown   - continue home med: lexapro 5mg daily     Cards  - Goal MAP > 65   - TTE EF 15-20%, akinetic apex and midsegments suggestive of takotsu   - Repeat echo 3/13, EF 20%   - Per cards: started on lopressor 25 BID, amio  mg BID     Pulm  - + Trach collar   - duonebs, mucomyst, saline nebs for secretions prn   - Pulm following     GI  - NGT - Vital 1.5 @65 + banatrol d/t diarrhea   - Bowel regimen held for loose stool, + banatrol, last BM 3/13  - sucralfate    RENAL:   - Chronic hyponatremia   - Voiding   - s/p lasix     HEME:  - DVT ppx: SQL 70 bid, SCD L side only  - LE dopplers + R IM calf DVT, 3/14 chronic R IM calf DVT  - Heme consulted for thrombocytopenia- recommend IV iron x 3 days    ID:   - Last panculture 3/12, Sputum psudomonas + staph aureus, +serratia marcesans   - Cefepime 2 g q 8 (3/12 - 3/18) x 7 d   - C.diff negative      ENDO:   - DKA resolved    - DM: A1C 5.8, coninue ISS   - Lantus 10u at bedtime, Reg insulin 6 u q6h    DISPO:   - ICU status, full code, dispo pending     D/W Dr. Ho and Dr. Huggins      Assessment:  Present when checked    []  GCS  E   V  M     Heart Failure: []Acute, [] acute on chronic , []chronic  Heart Failure:  [] Diastolic (HFpEF), [] Systolic (HFrEF), []Combined (HFpEF and HFrEF), [] RHF, [] Pulm HTN, [] Other    [] EUGENIA, [] ATN, [] AIN, [] other  [] CKD1, [] CKD2, [] CKD 3, [] CKD 4, [] CKD 5, []ESRD    Encephalopathy: [] Metabolic, [] Hepatic, [] toxic, [] Neurological, [] Other    Abnormal Nurtitional Status: [] malnurtition (see nutrition note), [ ]underweight: BMI < 19, [] morbid obesity: BMI >40, [] Cachexia    [] Sepsis  [] hypovolemic shock,[] cardiogenic shock, [] hemorrhagic shock, [] neuogenic shock  [] Acute Respiratory Failure  []Cerebral edema, [] Brain compression/ herniation,   [] Functional quadriplegia  [] Acute blood loss anemia

## 2023-03-15 NOTE — PROGRESS NOTE ADULT - ATTENDING COMMENTS
Initial attending contact date  3/8/23    . See fellow note written above for details. I reviewed the fellow documentation. I have personally seen and examined this patient. I reviewed vitals, labs, medications, cardiac studies, and additional imaging. I agree with the above fellow's findings and plans as written above with the following additions/statements.    67 y/o right handed M with PMH of T2DM, HLD, JAGDISH, hyponatremia, tracheal stenosis s/p tracheostomy, and glioblastoma s/p resection 1/2022 s/p radiation and chemo, admitted for cerebral angio with avastin tx due to recurrence of brain mass. Post op with HTN emergency leading to pulm edema  -Cardiology consulted for low EF noted on POCUS  -ECHO reviewed: severely depressed LVEF ~ 15-20% with akinetic/apical wall and basal inferior wall hyperkinesis. Mod TR with mild pulm HTN  -EKG post op NSR with new septal Qs and 1mm DAVID  -trop .32 now downtrending, BNP 6000  -Clinical picture along with EKG changes, mild trop elevation and ECHO findings all consistent with takotsubo CM  -Since takotsubo CM is diagnosis of exclusion , will need to r/o CAD if EF does not improve once clinical status improves  -Plan to repeat echo later next week  -Clinically with sig improvement, alert awake following commands  -Events 3/12 noted with AVNRT in setting of febrile state,  cont metoprolol 25 q 12 and amio 400 mg po bid for now. Will plan to dc amio if no recurrent events on tele  - K > 4.2, Mag > 2   -Lasix 40 mg IV x1 today

## 2023-03-15 NOTE — PROGRESS NOTE ADULT - ATTENDING COMMENTS
Pt seen on rounds this afternoon.  Valve placed for tracheostomy as noted.  He remains alert and in good spirits.  Although he is breathing comfortably, had mild wheezing on exam, more noticeable over the R lung.  Glucoses have been fluctuating more than usual, mostly the result of nutritional insulin doses being held for fingersticks below 100 mg%, and in the case of the bedtime FS of 89 last night and the 5 PM fingerstick today with the same value, probably more the result of excessive sliding scale coverage.    He continues to tolerate the tube feeds without abdominal fullness or nausea  --Rather than holding the 6 PM regular insulin dose tonight, will simply decrease the dose to 4 units  --Will continue the Lantus at 10 units for now--do not have any intervals off the feeds to re-evaluate his true basal insulin requirements  --Decrease the sliding scale coverage from moderate-dose to low-dose

## 2023-03-15 NOTE — SPEAKING VALVE EVALUATION - SUBJECTIVE COMPLAINTS DURING VALVE TRIAL
Intermittent strong cough that blows PMV off of trach hub. Coughing decreased as Pt adjusted to wearing PMV

## 2023-03-15 NOTE — PROGRESS NOTE ADULT - ASSESSMENT
68M PMH T2DM, HLD, JAGDISH, hyponatremia, tracheal stenosis s/p tracheostomy, and glioblastoma s/p resection of the left frontal enhancing tumor admitted for cerebral angiogram with Avastin treatment in the setting of a recurrent mass c/b stress cardiomyopathy 2/2 excess norepinephrine.    Review of studies:  ECG: NSR, 1st degree AVB, 0.5mm- 1mm DAVID v1-v3, IVCD  TTE 3/2021: Normal Bi-V function  TTE with severely reduced LV systolic function EF 15-20%, apex and mid segments are akinetic  TTE 3/8/23: LVEF 15-20% . The entire apex and all the mid segments are akinetic, with relative sparing of the basal segments. In the absence of ischemic heart disease, this is suggestive of Takotsubo's cardiomyopathy. Clinical correlation is advised. With echocontrast imaging, there is no evidence of left ventricular thrombus. Normal right ventricular size and systolic function. Normal atria. Moderate tricuspid regurgitation. PASP 40 mmHg. No pericardial effusion. Mildly dilated aortic root.   TTE 3/13/23: EF is still reduced at 20%    #Stress induced cardiomyopathy (Takotsubo cardiomyopathy)   Likely due to excess norepinephrine given relative good functional capacity, and normal TTE prior to the procedure.  Troponin peaked 0.32, no need to trend further. Off vasopressor support since 3/10. Briefly placed on neosynephrine during SVT episode, otherwise hemodynamically stable once in sinus rhythm.  -EF is still 20% - possibly still Takotsubo as this can take weeks to resolve.   - If EF does not improve, will need ischemic work-up when stable  - Give Lasix as needed  - Would repeat TTE next week.     #SVT/Atrial fibrillation  Telemetry 3/11/23: one episode of atrial fibrillation and SVT to 180s in the late morning - subsequent sinus rhythm s/p IV lopressor.  - Lopressor 25mg BID  - s/p IV amiodarone load. Continue with Amiodarone 400mg BID. Patient is rate and rhythm controlled. Discontinue amiodarone Friday to see however patient responds. SVT could have been provoked by sepsis.

## 2023-03-15 NOTE — SPEAKING VALVE EVALUATION - REMOVE VALVE FOR
SpO2 < 92%/Increase RR (1.5 x baseline)/Increased work of breathing/Evidence of air trapping/Subjective complaints

## 2023-03-16 NOTE — PROGRESS NOTE ADULT - SUBJECTIVE AND OBJECTIVE BOX
SUBJECTIVE / INTERVAL HPI: Patient was seen and examined this morning.     CAPILLARY BLOOD GLUCOSE & INSULIN RECEIVED  252 mg/dL (03-15 @ 04:59)  242 mg/dL (03-15 @ 05:42)  230 mg/dL (03-15 @ 06:02)  158 mg/dL (03-15 @ 13:12)  83 mg/dL (03-15 @ 18:07)  105 mg/dL (03-15 @ 23:02)  203 mg/dL (03-16 @ 04:55)  193 mg/dL (03-16 @ 04:58)  141 mg/dL (03-16 @ 12:01)      REVIEW OF SYSTEMS  Constitutional:  Negative fever, chills or loss of appetite.  Eyes:  Negative blurry vision or double vision.  Cardiovascular:  Negative for chest pain or palpitations.  Respiratory:  Negative for cough, wheezing, or shortness of breath.    Gastrointestinal:  Negative for nausea, vomiting, diarrhea, constipation, or abdominal pain.  Genitourinary:  Negative frequency, urgency or dysuria.  Neurologic:  No headache, confusion, dizziness, lightheadedness.    PHYSICAL EXAM  Vital Signs Last 24 Hrs  T(C): 36.9 (16 Mar 2023 05:29), Max: 37.4 (15 Mar 2023 22:14)  T(F): 98.4 (16 Mar 2023 05:29), Max: 99.4 (15 Mar 2023 22:14)  HR: 70 (16 Mar 2023 08:10) (70 - 87)  BP: 108/64 (16 Mar 2023 08:10) (97/57 - 110/67)  BP(mean): 81 (16 Mar 2023 08:10) (71 - 84)  RR: 20 (16 Mar 2023 08:10) (18 - 22)  SpO2: 99% (16 Mar 2023 08:10) (92% - 100%)    Parameters below as of 16 Mar 2023 08:10  Patient On (Oxygen Delivery Method): tracheostomy collar  O2 Flow (L/min): 10  O2 Concentration (%): 40    Constitutional: Awake, alert, in no acute distress.   HEENT: Normocephalic, atraumatic, JUAN.  Respiratory: Lungs clear to ausculation bilaterally.   Cardiovascular: regular rhythm, normal S1 and S2, no audible murmurs.   GI: soft, non-tender, non-distended, bowel sounds present.  Extremities: No lower extremity edema.  Psychiatric: AAO x 3. Normal affect/mood.     LABS  CBC - WBC/HGB/HTC/PLT: 7.10/8.9/27.0/125 (03-16-23)  BMP - Na/K/Cl/Bicarb/BUN/Cr/Gluc/AG/eGFR: 129/4.1/92/28/16/0.71/203/9/101 (03-16-23)  Ca - 8.4 (03-16-23)  Phos - 2.9 (03-16-23)  Mg - 1.8 (03-16-23)  LFT - Alb/Tprot/Tbili/Dbili/AlkPhos/ALT/AST: 2.6/--/0.9/--/72/41/29 (03-13-23)  PT/aPTT/INR: 18.1/28.7/1.51 (03-12-23)   Thyroid Stimulating Hormone, Serum: 1.210 (03-08-23)      MEDICATIONS  MEDICATIONS  (STANDING):  aMIOdarone    Tablet 400 milliGRAM(s) Oral two times a day  atorvastatin 20 milliGRAM(s) Oral at bedtime  cefepime   IVPB 2000 milliGRAM(s) IV Intermittent every 8 hours  chlorhexidine 0.12% Liquid 15 milliLiter(s) Oral Mucosa every 12 hours  chlorhexidine 2% Cloths 1 Application(s) Topical <User Schedule>  enoxaparin Injectable 70 milliGRAM(s) SubCutaneous every 12 hours  escitalopram 5 milliGRAM(s) Oral daily  fluticasone propionate 50 MICROgram(s)/spray Nasal Spray 1 Spray(s) Both Nostrils two times a day  insulin glargine Injectable (LANTUS) 10 Unit(s) SubCutaneous at bedtime  insulin regular  human corrective regimen sliding scale   SubCutaneous every 6 hours  insulin regular  human recombinant 6 Unit(s) SubCutaneous every 6 hours  levETIRAcetam 750 milliGRAM(s) Oral two times a day  melatonin 5 milliGRAM(s) Oral at bedtime  metoprolol tartrate 25 milliGRAM(s) Oral every 12 hours    MEDICATIONS  (PRN):  acetaminophen     Tablet .. 650 milliGRAM(s) Oral every 6 hours PRN Temp greater or equal to 38C (100.4F), Mild Pain (1 - 3)  acetylcysteine 20%  Inhalation 4 milliLiter(s) Inhalation every 6 hours PRN secretions  albuterol/ipratropium for Nebulization 3 milliLiter(s) Nebulizer every 6 hours PRN Shortness of Breath and/or Wheezing  ondansetron Injectable 4 milliGRAM(s) IV Push every 8 hours PRN Nausea and/or Vomiting  sodium chloride 0.9% lock flush 10 milliLiter(s) IV Push every 1 hour PRN Pre/post blood products, medications, blood draw, and to maintain line patency  sodium chloride 3%  Inhalation 4 milliLiter(s) Inhalation every 6 hours PRN secretions    ASSESSMENT / RECOMMENDATIONS    A1C: 5.8 %  BUN: 16  Creatinine: 0.71  GFR: 101  Weight: 71.214  BMI: 24.6  EF:     # Type 2 diabetes mellitus  - Please continue lantus *** units at bedtime.   - Continue lispro *** units before each meal.  - Continue lispro moderate / low dose sliding scale before meals and at bedtime.  - Patient's fingerstick glucose goal is 100-180 mg/dL.    - For discharge, patient can ***.    - Patient can follow up at discharge with [facility] by calling [phone] to make an appointment.      Case discussed with Dr. Hoang. Primary team updated.       Fabrice Madrid    Endocrinology Fellow    Service Pager: [phone]    SUBJECTIVE / INTERVAL HPI: Patient was seen and examined this morning.     CAPILLARY BLOOD GLUCOSE & INSULIN RECEIVED  89 mg/dL (03-14 @ 23:04) ? 10 units of lantus (No standing regular given as order was changed)  242 mg/dL (03-15 @ 05:42) ? 6 units of regular insulin + 4 units of sliding scale.  158 mg/dL (03-15 @ 13:12) ? 6 units of regular insulin + 2 units of sliding scale.   83 mg/dL (03-15 @ 18:07) ? 4 units of regular insulin.   105 mg/dL (03-15 @ 23:02) ? 10 units of regular insulin.   203 mg/dL (03-16 @ 04:55) ? 6 units of regular insulin + 1 unit of sliding scale.   141 mg/dL (03-16 @ 12:01) ?     REVIEW OF SYSTEMS  Constitutional:  Negative fever, chills or loss of appetite.  Eyes:  Negative blurry vision or double vision.  Cardiovascular:  Negative for chest pain or palpitations.  Respiratory:  Negative for cough, wheezing, or shortness of breath.    Gastrointestinal:  Negative for nausea, vomiting, diarrhea, constipation, or abdominal pain.  Genitourinary:  Negative frequency, urgency or dysuria.  Neurologic:  No headache, confusion, dizziness, lightheadedness.    PHYSICAL EXAM  Vital Signs Last 24 Hrs  T(C): 36.9 (16 Mar 2023 05:29), Max: 37.4 (15 Mar 2023 22:14)  T(F): 98.4 (16 Mar 2023 05:29), Max: 99.4 (15 Mar 2023 22:14)  HR: 70 (16 Mar 2023 08:10) (70 - 87)  BP: 108/64 (16 Mar 2023 08:10) (97/57 - 110/67)  BP(mean): 81 (16 Mar 2023 08:10) (71 - 84)  RR: 20 (16 Mar 2023 08:10) (18 - 22)  SpO2: 99% (16 Mar 2023 08:10) (92% - 100%)    Parameters below as of 16 Mar 2023 08:10  Patient On (Oxygen Delivery Method): tracheostomy collar  O2 Flow (L/min): 10  O2 Concentration (%): 40    Constitutional: Awake, alert, in no acute distress.   HEENT: Normocephalic, atraumatic, JUAN.  Respiratory: Lungs clear to ausculation bilaterally.   Cardiovascular: regular rhythm, normal S1 and S2, no audible murmurs.   GI: soft, non-tender, non-distended, bowel sounds present.  Extremities: No lower extremity edema.  Psychiatric: AAO x 3. Normal affect/mood.     LABS  CBC - WBC/HGB/HTC/PLT: 7.10/8.9/27.0/125 (03-16-23)  BMP - Na/K/Cl/Bicarb/BUN/Cr/Gluc/AG/eGFR: 129/4.1/92/28/16/0.71/203/9/101 (03-16-23)  Ca - 8.4 (03-16-23)  Phos - 2.9 (03-16-23)  Mg - 1.8 (03-16-23)  LFT - Alb/Tprot/Tbili/Dbili/AlkPhos/ALT/AST: 2.6/--/0.9/--/72/41/29 (03-13-23)  PT/aPTT/INR: 18.1/28.7/1.51 (03-12-23)   Thyroid Stimulating Hormone, Serum: 1.210 (03-08-23)      MEDICATIONS  MEDICATIONS  (STANDING):  aMIOdarone    Tablet 400 milliGRAM(s) Oral two times a day  atorvastatin 20 milliGRAM(s) Oral at bedtime  cefepime   IVPB 2000 milliGRAM(s) IV Intermittent every 8 hours  chlorhexidine 0.12% Liquid 15 milliLiter(s) Oral Mucosa every 12 hours  chlorhexidine 2% Cloths 1 Application(s) Topical <User Schedule>  enoxaparin Injectable 70 milliGRAM(s) SubCutaneous every 12 hours  escitalopram 5 milliGRAM(s) Oral daily  fluticasone propionate 50 MICROgram(s)/spray Nasal Spray 1 Spray(s) Both Nostrils two times a day  insulin glargine Injectable (LANTUS) 10 Unit(s) SubCutaneous at bedtime  insulin regular  human corrective regimen sliding scale   SubCutaneous every 6 hours  insulin regular  human recombinant 6 Unit(s) SubCutaneous every 6 hours  levETIRAcetam 750 milliGRAM(s) Oral two times a day  melatonin 5 milliGRAM(s) Oral at bedtime  metoprolol tartrate 25 milliGRAM(s) Oral every 12 hours    MEDICATIONS  (PRN):  acetaminophen     Tablet .. 650 milliGRAM(s) Oral every 6 hours PRN Temp greater or equal to 38C (100.4F), Mild Pain (1 - 3)  acetylcysteine 20%  Inhalation 4 milliLiter(s) Inhalation every 6 hours PRN secretions  albuterol/ipratropium for Nebulization 3 milliLiter(s) Nebulizer every 6 hours PRN Shortness of Breath and/or Wheezing  ondansetron Injectable 4 milliGRAM(s) IV Push every 8 hours PRN Nausea and/or Vomiting  sodium chloride 0.9% lock flush 10 milliLiter(s) IV Push every 1 hour PRN Pre/post blood products, medications, blood draw, and to maintain line patency  sodium chloride 3%  Inhalation 4 milliLiter(s) Inhalation every 6 hours PRN secretions    ASSESSMENT / RECOMMENDATIONS    A1C: 5.8 %  BUN: 16  Creatinine: 0.71  GFR: 101  Weight: 71.214  BMI: 24.6  EF:     # Type 2 diabetes mellitus  - Please continue lantus *** units at bedtime.   - Continue lispro *** units before each meal.  - Continue lispro moderate / low dose sliding scale before meals and at bedtime.  - Patient's fingerstick glucose goal is 100-180 mg/dL.    - For discharge, patient can ***.    - Patient can follow up at discharge with [facility] by calling [phone] to make an appointment.      Case discussed with Dr. Hoang. Primary team updated.       Fabrice Madrid    Endocrinology Fellow    Service Pager: [phone]    SUBJECTIVE / INTERVAL HPI: Patient was seen and examined this morning. He mentioned feeling a little tired during evaluation as he had been walking this morning and was sitting down in chair next to his bed for most of the morning. He continues to receive Vital 1.5 ji without any significant interruptions; however, the rate was decreased from 65 mL/hr to 50 mL/hr this morning around 10 AM.     CAPILLARY BLOOD GLUCOSE & INSULIN RECEIVED  89 mg/dL (03-14 @ 23:04) ? 10 units of lantus (No standing regular given as order was changed)  242 mg/dL (03-15 @ 05:42) ? 6 units of regular insulin + 4 units of sliding scale.  158 mg/dL (03-15 @ 13:12) ? 6 units of regular insulin + 2 units of sliding scale.   83 mg/dL (03-15 @ 18:07) ? 4 units of regular insulin.   105 mg/dL (03-15 @ 23:02) ? 10 units of regular insulin.   203 mg/dL (03-16 @ 04:55) ? 6 units of regular insulin + 1 unit of sliding scale.   141 mg/dL (03-16 @ 12:01) ? Ø    REVIEW OF SYSTEMS  Constitutional:  Negative fever or chills.  Cardiovascular:  Negative for chest pain or palpitations.  Respiratory:  (+) Wheezing. Negative for cough or shortness of breath.    Gastrointestinal:  Negative for nausea, vomiting, diarrhea, constipation, or abdominal pain.  Neurologic:  No headache, confusion, dizziness, lightheadedness.    PHYSICAL EXAM  Vital Signs Last 24 Hrs  T(C): 36.9 (16 Mar 2023 05:29), Max: 37.4 (15 Mar 2023 22:14)  T(F): 98.4 (16 Mar 2023 05:29), Max: 99.4 (15 Mar 2023 22:14)  HR: 70 (16 Mar 2023 08:10) (70 - 87)  BP: 108/64 (16 Mar 2023 08:10) (97/57 - 110/67)  BP(mean): 81 (16 Mar 2023 08:10) (71 - 84)  RR: 20 (16 Mar 2023 08:10) (18 - 22)  SpO2: 99% (16 Mar 2023 08:10) (92% - 100%)    Parameters below as of 16 Mar 2023 08:10  Patient On (Oxygen Delivery Method): tracheostomy collar  O2 Flow (L/min): 10  O2 Concentration (%): 40    Constitutional: Awake, alert, male, in no distress.   Neck: (+) Tracheostomy.   Respiratory: (+) Rales, (+) expiratory wheezing.   Cardiovascular: regular rhythm, normal S1 and S2, no audible murmurs.   GI: soft, non-distended, bowel sounds increased.   Extremities: No lower extremity edema.    LABS  CBC - WBC/HGB/HTC/PLT: 7.10/8.9/27.0/125 (03-16-23)  BMP - Na/K/Cl/Bicarb/BUN/Cr/Gluc/AG/eGFR: 129/4.1/92/28/16/0.71/203/9/101 (03-16-23)  Ca - 8.4 (03-16-23)  Phos - 2.9 (03-16-23)  Mg - 1.8 (03-16-23)  LFT - Alb/Tprot/Tbili/Dbili/AlkPhos/ALT/AST: 2.6/--/0.9/--/72/41/29 (03-13-23)  PT/aPTT/INR: 18.1/28.7/1.51 (03-12-23)   Thyroid Stimulating Hormone, Serum: 1.210 (03-08-23)    MEDICATIONS  MEDICATIONS  (STANDING):  aMIOdarone    Tablet 400 milliGRAM(s) Oral two times a day  atorvastatin 20 milliGRAM(s) Oral at bedtime  cefepime   IVPB 2000 milliGRAM(s) IV Intermittent every 8 hours  chlorhexidine 0.12% Liquid 15 milliLiter(s) Oral Mucosa every 12 hours  chlorhexidine 2% Cloths 1 Application(s) Topical <User Schedule>  enoxaparin Injectable 70 milliGRAM(s) SubCutaneous every 12 hours  escitalopram 5 milliGRAM(s) Oral daily  fluticasone propionate 50 MICROgram(s)/spray Nasal Spray 1 Spray(s) Both Nostrils two times a day  insulin glargine Injectable (LANTUS) 10 Unit(s) SubCutaneous at bedtime  insulin regular  human corrective regimen sliding scale   SubCutaneous every 6 hours  insulin regular  human recombinant 6 Unit(s) SubCutaneous every 6 hours  levETIRAcetam 750 milliGRAM(s) Oral two times a day  melatonin 5 milliGRAM(s) Oral at bedtime  metoprolol tartrate 25 milliGRAM(s) Oral every 12 hours    MEDICATIONS  (PRN):  acetaminophen     Tablet .. 650 milliGRAM(s) Oral every 6 hours PRN Temp greater or equal to 38C (100.4F), Mild Pain (1 - 3)  acetylcysteine 20%  Inhalation 4 milliLiter(s) Inhalation every 6 hours PRN secretions  albuterol/ipratropium for Nebulization 3 milliLiter(s) Nebulizer every 6 hours PRN Shortness of Breath and/or Wheezing  ondansetron Injectable 4 milliGRAM(s) IV Push every 8 hours PRN Nausea and/or Vomiting  sodium chloride 0.9% lock flush 10 milliLiter(s) IV Push every 1 hour PRN Pre/post blood products, medications, blood draw, and to maintain line patency  sodium chloride 3%  Inhalation 4 milliLiter(s) Inhalation every 6 hours PRN secretions    ASSESSMENT / RECOMMENDATIONS  Mr. Norwood is a 68-year-old male with a past medical history of type 2 diabetes mellitus, hyperlipidemia, iron deficiency anemia, tracheal stenosis (s/p tracheostomy) and glioblastoma (found to have a mass in 1/2022, s/p resection of left frontal enhancing tumor with GLEOLAN placed on 1/27/22 with pathology showing gliosarcoma, WHO grade IV s/p radiation and chemotherapy completed on 12/2022, repeat MRI showing recurrence of brain mass on 12/2022) who presented for further management, now s/p cerebral angiogram with Avastin treatment (3/7/23). Post-operative course was complicated by flash pulmonary edema, desaturation, new global aphasia, right sided weakness, lactic acidosis and hyperglycemia. Endocrinology following for recommendations regarding glycemic control.     A1C: 5.8 %  BUN: 16  Creatinine: 0.71  GFR: 101  Weight: 71.214  BMI: 24.6  EF:     # Type 2 diabetes mellitus  - Please continue lantus *** units at bedtime.   - Continue lispro *** units before each meal.  - Continue lispro moderate / low dose sliding scale before meals and at bedtime.  - Patient's fingerstick glucose goal is 100-180 mg/dL.    - For discharge, patient can ***.    - Patient can follow up at discharge with [facility] by calling [phone] to make an appointment.      Case discussed with Dr. Hoang. Primary team updated.       Fabrice Madrid    Endocrinology Fellow    Service Pager: [phone]    SUBJECTIVE / INTERVAL HPI: Patient was seen and examined this morning. He mentioned feeling a little tired during evaluation as he had been walking this morning and was sitting down in chair next to his bed for most of the morning. He continues to receive Vital 1.5 ji without any significant interruptions; however, the rate was decreased from 65 mL/hr to 50 mL/hr this morning around 10 AM. He passed his swallowing evaluation and his diet will be advanced this evening.     CAPILLARY BLOOD GLUCOSE & INSULIN RECEIVED  89 mg/dL (03-14 @ 23:04) ? 10 units of lantus (No standing regular given as order was changed)  242 mg/dL (03-15 @ 05:42) ? 6 units of regular insulin + 4 units of sliding scale.  158 mg/dL (03-15 @ 13:12) ? 6 units of regular insulin + 2 units of sliding scale.   83 mg/dL (03-15 @ 18:07) ? 4 units of regular insulin.   105 mg/dL (03-15 @ 23:02) ? 10 units of regular insulin.   203 mg/dL (03-16 @ 04:55) ? 6 units of regular insulin + 1 unit of sliding scale.   141 mg/dL (03-16 @ 12:01) ? Ø    REVIEW OF SYSTEMS  Constitutional:  Negative fever or chills.  Cardiovascular:  Negative for chest pain or palpitations.  Respiratory:  (+) Wheezing. Negative for cough or shortness of breath.    Gastrointestinal:  Negative for nausea, vomiting, diarrhea, constipation, or abdominal pain.  Neurologic:  No headache, confusion, dizziness, lightheadedness.    PHYSICAL EXAM  Vital Signs Last 24 Hrs  T(C): 36.9 (16 Mar 2023 05:29), Max: 37.4 (15 Mar 2023 22:14)  T(F): 98.4 (16 Mar 2023 05:29), Max: 99.4 (15 Mar 2023 22:14)  HR: 70 (16 Mar 2023 08:10) (70 - 87)  BP: 108/64 (16 Mar 2023 08:10) (97/57 - 110/67)  BP(mean): 81 (16 Mar 2023 08:10) (71 - 84)  RR: 20 (16 Mar 2023 08:10) (18 - 22)  SpO2: 99% (16 Mar 2023 08:10) (92% - 100%)    Parameters below as of 16 Mar 2023 08:10  Patient On (Oxygen Delivery Method): tracheostomy collar  O2 Flow (L/min): 10  O2 Concentration (%): 40    Constitutional: Awake, alert, male, in no distress.   Neck: (+) Tracheostomy.   Respiratory: (+) Rales, (+) expiratory wheezing.   Cardiovascular: regular rhythm, normal S1 and S2, no audible murmurs.   GI: soft, non-distended, bowel sounds increased.   Extremities: No lower extremity edema.    LABS  CBC - WBC/HGB/HTC/PLT: 7.10/8.9/27.0/125 (03-16-23)  BMP - Na/K/Cl/Bicarb/BUN/Cr/Gluc/AG/eGFR: 129/4.1/92/28/16/0.71/203/9/101 (03-16-23)  Ca - 8.4 (03-16-23)  Phos - 2.9 (03-16-23)  Mg - 1.8 (03-16-23)  LFT - Alb/Tprot/Tbili/Dbili/AlkPhos/ALT/AST: 2.6/--/0.9/--/72/41/29 (03-13-23)  PT/aPTT/INR: 18.1/28.7/1.51 (03-12-23)   Thyroid Stimulating Hormone, Serum: 1.210 (03-08-23)    MEDICATIONS  MEDICATIONS  (STANDING):  aMIOdarone    Tablet 400 milliGRAM(s) Oral two times a day  atorvastatin 20 milliGRAM(s) Oral at bedtime  cefepime   IVPB 2000 milliGRAM(s) IV Intermittent every 8 hours  chlorhexidine 0.12% Liquid 15 milliLiter(s) Oral Mucosa every 12 hours  chlorhexidine 2% Cloths 1 Application(s) Topical <User Schedule>  enoxaparin Injectable 70 milliGRAM(s) SubCutaneous every 12 hours  escitalopram 5 milliGRAM(s) Oral daily  fluticasone propionate 50 MICROgram(s)/spray Nasal Spray 1 Spray(s) Both Nostrils two times a day  insulin glargine Injectable (LANTUS) 10 Unit(s) SubCutaneous at bedtime  insulin regular  human corrective regimen sliding scale   SubCutaneous every 6 hours  insulin regular  human recombinant 6 Unit(s) SubCutaneous every 6 hours  levETIRAcetam 750 milliGRAM(s) Oral two times a day  melatonin 5 milliGRAM(s) Oral at bedtime  metoprolol tartrate 25 milliGRAM(s) Oral every 12 hours    MEDICATIONS  (PRN):  acetaminophen     Tablet .. 650 milliGRAM(s) Oral every 6 hours PRN Temp greater or equal to 38C (100.4F), Mild Pain (1 - 3)  acetylcysteine 20%  Inhalation 4 milliLiter(s) Inhalation every 6 hours PRN secretions  albuterol/ipratropium for Nebulization 3 milliLiter(s) Nebulizer every 6 hours PRN Shortness of Breath and/or Wheezing  ondansetron Injectable 4 milliGRAM(s) IV Push every 8 hours PRN Nausea and/or Vomiting  sodium chloride 0.9% lock flush 10 milliLiter(s) IV Push every 1 hour PRN Pre/post blood products, medications, blood draw, and to maintain line patency  sodium chloride 3%  Inhalation 4 milliLiter(s) Inhalation every 6 hours PRN secretions    ASSESSMENT / RECOMMENDATIONS  Mr. Norwood is a 68-year-old male with a past medical history of type 2 diabetes mellitus, hyperlipidemia, iron deficiency anemia, tracheal stenosis (s/p tracheostomy) and glioblastoma (found to have a mass in 1/2022, s/p resection of left frontal enhancing tumor with GLEOLAN placed on 1/27/22 with pathology showing gliosarcoma, WHO grade IV s/p radiation and chemotherapy completed on 12/2022, repeat MRI showing recurrence of brain mass on 12/2022) who presented for further management, now s/p cerebral angiogram with Avastin treatment (3/7/23). Post-operative course was complicated by flash pulmonary edema, desaturation, new global aphasia, right sided weakness, lactic acidosis and hyperglycemia. Endocrinology following for recommendations regarding glycemic control.     A1C: 5.8 %  BUN: 16  Creatinine: 0.71  GFR: 101  Weight: 71.214  BMI: 24.6    # Type 2 diabetes mellitus  - Diet will be advanced tonight while he remains on tube feeds. Plan is for him to continue tube feeds overnight, and to discontinue feeds at 6 AM tomorrow if he tolerates dinner without any issues. If tube feeds are discontinued tomorrow at 6 AM, he shouldn't get the regular insulin scheduled for 6 AM since he would develop subsequent hypoglycemia later in the morning (last dose of regular insulin would be at midnight).   - Please continue lantus 10 units at bedtime.   - Continue with regular insulin 5 units every 6 hours while on tube feeds.   - Start lispro 4 units before each meal.  - Continue lispro low dose sliding scale before meals and at bedtime.  - Patient's fingerstick glucose goal is 100-180 mg/dL.    - Patient can follow up at discharge with [facility] by calling [phone] to make an appointment.      Case discussed with Dr. Hoang. Primary team updated.       Fabrice Madrid    Endocrinology Fellow    Service Pager: [phone]    SUBJECTIVE / INTERVAL HPI: Patient was seen and examined this morning. He mentioned feeling a little tired during evaluation as he had been walking this morning and was sitting down in chair next to his bed for most of the morning. He continues to receive Vital 1.5 ji without any significant interruptions; however, the rate was decreased from 65 mL/hr to 50 mL/hr this morning around 10 AM. He passed his swallowing evaluation and his diet will be advanced this evening.     CAPILLARY BLOOD GLUCOSE & INSULIN RECEIVED  89 mg/dL (03-14 @ 23:04) ? 10 units of lantus (No standing regular given as order was changed)  242 mg/dL (03-15 @ 05:42) ? 6 units of regular insulin + 4 units of sliding scale.  158 mg/dL (03-15 @ 13:12) ? 6 units of regular insulin + 2 units of sliding scale.   83 mg/dL (03-15 @ 18:07) ? 4 units of regular insulin.   105 mg/dL (03-15 @ 23:02) ? 10 units of regular insulin.   203 mg/dL (03-16 @ 04:55) ? 6 units of regular insulin + 1 unit of sliding scale.   141 mg/dL (03-16 @ 12:01) ? Ø    REVIEW OF SYSTEMS  Constitutional:  Negative fever or chills.  Cardiovascular:  Negative for chest pain or palpitations.  Respiratory:  Negative for cough or shortness of breath.    Gastrointestinal:  Negative for nausea, vomiting, diarrhea, constipation, or abdominal pain.  Neurologic:  No headache, confusion, dizziness, lightheadedness.    PHYSICAL EXAM  Vital Signs Last 24 Hrs  T(C): 36.9 (16 Mar 2023 05:29), Max: 37.4 (15 Mar 2023 22:14)  T(F): 98.4 (16 Mar 2023 05:29), Max: 99.4 (15 Mar 2023 22:14)  HR: 70 (16 Mar 2023 08:10) (70 - 87)  BP: 108/64 (16 Mar 2023 08:10) (97/57 - 110/67)  BP(mean): 81 (16 Mar 2023 08:10) (71 - 84)  RR: 20 (16 Mar 2023 08:10) (18 - 22)  SpO2: 99% (16 Mar 2023 08:10) (92% - 100%)    Parameters below as of 16 Mar 2023 08:10  Patient On (Oxygen Delivery Method): tracheostomy collar  O2 Flow (L/min): 10  O2 Concentration (%): 40    Constitutional: Awake, alert, male, in no distress.   Neck: (+) Tracheostomy.   Respiratory: (+) Rales, (+) mild wheezing.   Cardiovascular: regular rhythm, normal S1 and S2, no audible murmurs.   GI: soft, non-distended, bowel sounds increased.   Extremities: No lower extremity edema.    LABS  CBC - WBC/HGB/HTC/PLT: 7.10/8.9/27.0/125 (03-16-23)  BMP - Na/K/Cl/Bicarb/BUN/Cr/Gluc/AG/eGFR: 129/4.1/92/28/16/0.71/203/9/101 (03-16-23)  Ca - 8.4 (03-16-23)  Phos - 2.9 (03-16-23)  Mg - 1.8 (03-16-23)  LFT - Alb/Tprot/Tbili/Dbili/AlkPhos/ALT/AST: 2.6/--/0.9/--/72/41/29 (03-13-23)  PT/aPTT/INR: 18.1/28.7/1.51 (03-12-23)   Thyroid Stimulating Hormone, Serum: 1.210 (03-08-23)    MEDICATIONS  MEDICATIONS  (STANDING):  aMIOdarone    Tablet 400 milliGRAM(s) Oral two times a day  atorvastatin 20 milliGRAM(s) Oral at bedtime  cefepime   IVPB 2000 milliGRAM(s) IV Intermittent every 8 hours  chlorhexidine 0.12% Liquid 15 milliLiter(s) Oral Mucosa every 12 hours  chlorhexidine 2% Cloths 1 Application(s) Topical <User Schedule>  enoxaparin Injectable 70 milliGRAM(s) SubCutaneous every 12 hours  escitalopram 5 milliGRAM(s) Oral daily  fluticasone propionate 50 MICROgram(s)/spray Nasal Spray 1 Spray(s) Both Nostrils two times a day  insulin glargine Injectable (LANTUS) 10 Unit(s) SubCutaneous at bedtime  insulin regular  human corrective regimen sliding scale   SubCutaneous every 6 hours  insulin regular  human recombinant 6 Unit(s) SubCutaneous every 6 hours  levETIRAcetam 750 milliGRAM(s) Oral two times a day  melatonin 5 milliGRAM(s) Oral at bedtime  metoprolol tartrate 25 milliGRAM(s) Oral every 12 hours    MEDICATIONS  (PRN):  acetaminophen     Tablet .. 650 milliGRAM(s) Oral every 6 hours PRN Temp greater or equal to 38C (100.4F), Mild Pain (1 - 3)  acetylcysteine 20%  Inhalation 4 milliLiter(s) Inhalation every 6 hours PRN secretions  albuterol/ipratropium for Nebulization 3 milliLiter(s) Nebulizer every 6 hours PRN Shortness of Breath and/or Wheezing  ondansetron Injectable 4 milliGRAM(s) IV Push every 8 hours PRN Nausea and/or Vomiting  sodium chloride 0.9% lock flush 10 milliLiter(s) IV Push every 1 hour PRN Pre/post blood products, medications, blood draw, and to maintain line patency  sodium chloride 3%  Inhalation 4 milliLiter(s) Inhalation every 6 hours PRN secretions    ASSESSMENT / RECOMMENDATIONS  Mr. Norwood is a 68-year-old male with a past medical history of type 2 diabetes mellitus, hyperlipidemia, iron deficiency anemia, tracheal stenosis (s/p tracheostomy) and glioblastoma (found to have a mass in 1/2022, s/p resection of left frontal enhancing tumor with GLEOLAN placed on 1/27/22 with pathology showing gliosarcoma, WHO grade IV s/p radiation and chemotherapy completed on 12/2022, repeat MRI showing recurrence of brain mass on 12/2022) who presented for further management, now s/p cerebral angiogram with Avastin treatment (3/7/23). Post-operative course was complicated by flash pulmonary edema, desaturation, new global aphasia, right sided weakness, lactic acidosis and hyperglycemia. Endocrinology following for recommendations regarding glycemic control.     A1C: 5.8 %  BUN: 16  Creatinine: 0.71  GFR: 101  Weight: 71.214  BMI: 24.6    # Type 2 diabetes mellitus  - Diet will be advanced tonight while he remains on tube feeds. Plan is for him to continue tube feeds overnight, and to discontinue feeds at 6 AM tomorrow if he tolerates dinner without any issues. If tube feeds are discontinued tomorrow at 6 AM, he shouldn't get the regular insulin scheduled for 6 AM since he would develop subsequent hypoglycemia later in the morning (last dose of regular insulin would be at midnight).   - Please continue lantus 10 units at bedtime.   - Continue with regular insulin 5 units every 6 hours while on tube feeds. In addition, start lispro 4 units before each meal.  - Continue lispro low dose sliding scale before meals and at bedtime.  - Patient's fingerstick glucose goal is 100-180 mg/dL.    - Patient can follow up at discharge with [facility] by calling [phone] to make an appointment.      Case discussed with Dr. Hoang. Primary team updated.       Fabrice Madrid    Endocrinology Fellow    Service Pager: [phone]    SUBJECTIVE / INTERVAL HPI: Patient was seen and examined this morning. He mentioned feeling a little tired during evaluation as he had been walking this morning and was sitting down in chair next to his bed for most of the morning. He continues to receive Vital 1.5 ji without any significant interruptions; however, the rate was decreased from 65 mL/hr to 50 mL/hr this morning around 10 AM. He passed his swallowing evaluation and his diet will be advanced this evening.     CAPILLARY BLOOD GLUCOSE & INSULIN RECEIVED  89 mg/dL (03-14 @ 23:04) ? 10 units of lantus (No standing regular given as order was changed)  242 mg/dL (03-15 @ 05:42) ? 6 units of regular insulin + 4 units of sliding scale.  158 mg/dL (03-15 @ 13:12) ? 6 units of regular insulin + 2 units of sliding scale.   83 mg/dL (03-15 @ 18:07) ? 4 units of regular insulin.   105 mg/dL (03-15 @ 23:02) ? 10 units of Lantus insulin.   203 mg/dL (03-16 @ 04:55) ? 6 units of regular insulin + 1 unit of sliding scale.   141 mg/dL (03-16 @ 12:01) ? Ø    REVIEW OF SYSTEMS  Constitutional:  Negative fever or chills.  Cardiovascular:  Negative for chest pain or palpitations.  Respiratory:  Negative for cough or shortness of breath.    Gastrointestinal:  Negative for nausea, vomiting, diarrhea, constipation, or abdominal pain.  Neurologic:  No headache, confusion, dizziness, lightheadedness.    PHYSICAL EXAM  Vital Signs Last 24 Hrs  T(C): 36.9 (16 Mar 2023 05:29), Max: 37.4 (15 Mar 2023 22:14)  T(F): 98.4 (16 Mar 2023 05:29), Max: 99.4 (15 Mar 2023 22:14)  HR: 70 (16 Mar 2023 08:10) (70 - 87)  BP: 108/64 (16 Mar 2023 08:10) (97/57 - 110/67)  BP(mean): 81 (16 Mar 2023 08:10) (71 - 84)  RR: 20 (16 Mar 2023 08:10) (18 - 22)  SpO2: 99% (16 Mar 2023 08:10) (92% - 100%)    Parameters below as of 16 Mar 2023 08:10  Patient On (Oxygen Delivery Method): tracheostomy collar  O2 Flow (L/min): 10  O2 Concentration (%): 40    Constitutional: Awake, alert, male, in no distress.   Neck: (+) Tracheostomy.   Respiratory: (+) Rales, (+) mild wheezing.   Cardiovascular: regular rhythm, normal S1 and S2, no audible murmurs.   GI: soft, non-distended, bowel sounds increased.   Extremities: No lower extremity edema.    LABS  CBC - WBC/HGB/HTC/PLT: 7.10/8.9/27.0/125 (03-16-23)  BMP - Na/K/Cl/Bicarb/BUN/Cr/Gluc/AG/eGFR: 129/4.1/92/28/16/0.71/203/9/101 (03-16-23)  Ca - 8.4 (03-16-23)  Phos - 2.9 (03-16-23)  Mg - 1.8 (03-16-23)  LFT - Alb/Tprot/Tbili/Dbili/AlkPhos/ALT/AST: 2.6/--/0.9/--/72/41/29 (03-13-23)  PT/aPTT/INR: 18.1/28.7/1.51 (03-12-23)   Thyroid Stimulating Hormone, Serum: 1.210 (03-08-23)    MEDICATIONS  MEDICATIONS  (STANDING):  aMIOdarone    Tablet 400 milliGRAM(s) Oral two times a day  atorvastatin 20 milliGRAM(s) Oral at bedtime  cefepime   IVPB 2000 milliGRAM(s) IV Intermittent every 8 hours  chlorhexidine 0.12% Liquid 15 milliLiter(s) Oral Mucosa every 12 hours  chlorhexidine 2% Cloths 1 Application(s) Topical <User Schedule>  enoxaparin Injectable 70 milliGRAM(s) SubCutaneous every 12 hours  escitalopram 5 milliGRAM(s) Oral daily  fluticasone propionate 50 MICROgram(s)/spray Nasal Spray 1 Spray(s) Both Nostrils two times a day  insulin glargine Injectable (LANTUS) 10 Unit(s) SubCutaneous at bedtime  insulin regular  human corrective regimen sliding scale   SubCutaneous every 6 hours  insulin regular  human recombinant 6 Unit(s) SubCutaneous every 6 hours  levETIRAcetam 750 milliGRAM(s) Oral two times a day  melatonin 5 milliGRAM(s) Oral at bedtime  metoprolol tartrate 25 milliGRAM(s) Oral every 12 hours    MEDICATIONS  (PRN):  acetaminophen     Tablet .. 650 milliGRAM(s) Oral every 6 hours PRN Temp greater or equal to 38C (100.4F), Mild Pain (1 - 3)  acetylcysteine 20%  Inhalation 4 milliLiter(s) Inhalation every 6 hours PRN secretions  albuterol/ipratropium for Nebulization 3 milliLiter(s) Nebulizer every 6 hours PRN Shortness of Breath and/or Wheezing  ondansetron Injectable 4 milliGRAM(s) IV Push every 8 hours PRN Nausea and/or Vomiting  sodium chloride 0.9% lock flush 10 milliLiter(s) IV Push every 1 hour PRN Pre/post blood products, medications, blood draw, and to maintain line patency  sodium chloride 3%  Inhalation 4 milliLiter(s) Inhalation every 6 hours PRN secretions    ASSESSMENT / RECOMMENDATIONS  Mr. Norwood is a 68-year-old male with a past medical history of type 2 diabetes mellitus, hyperlipidemia, iron deficiency anemia, tracheal stenosis (s/p tracheostomy) and glioblastoma (found to have a mass in 1/2022, s/p resection of left frontal enhancing tumor with GLEOLAN placed on 1/27/22 with pathology showing gliosarcoma, WHO grade IV s/p radiation and chemotherapy completed on 12/2022, repeat MRI showing recurrence of brain mass on 12/2022) who presented for further management, now s/p cerebral angiogram with Avastin treatment (3/7/23). Post-operative course was complicated by flash pulmonary edema, desaturation, new global aphasia, right sided weakness, lactic acidosis and hyperglycemia. Endocrinology following for recommendations regarding glycemic control.     A1C: 5.8 %  BUN: 16  Creatinine: 0.71  GFR: 101  Weight: 71.214  BMI: 24.6    # Type 2 diabetes mellitus  - Diet will be advanced tonight while he remains on tube feeds. Plan is for him to continue tube feeds overnight, and to discontinue feeds at 6 AM tomorrow if he tolerates dinner without any issues. If tube feeds are discontinued tomorrow at 6 AM, he shouldn't get the regular insulin scheduled for 6 AM since he would develop subsequent hypoglycemia later in the morning (last dose of regular insulin would be at midnight).   - Please continue lantus 10 units at bedtime.   - Continue with regular insulin 5 units every 6 hours while on tube feeds. In addition, start lispro 4 units before each meal.  - Continue lispro low dose sliding scale before meals and at bedtime.  - Patient's fingerstick glucose goal is 100-180 mg/dL.    - Patient can follow up at discharge with [facility] by calling [phone] to make an appointment.      Case discussed with Dr. Hoang. Primary team updated.       Fabrice Madrid    Endocrinology Fellow    Service Pager: [phone]

## 2023-03-16 NOTE — PROGRESS NOTE ADULT - ATTENDING COMMENTS
Initial attending contact date  3/8/23    . See fellow note written above for details. I reviewed the fellow documentation. I have personally seen and examined this patient. I reviewed vitals, labs, medications, cardiac studies, and additional imaging. I agree with the above fellow's findings and plans as written above with the following additions/statements.    69 y/o right handed M with PMH of T2DM, HLD, JAGDISH, hyponatremia, tracheal stenosis s/p tracheostomy, and glioblastoma s/p resection 1/2022 s/p radiation and chemo, admitted for cerebral angio with avastin tx due to recurrence of brain mass. Post op with HTN emergency leading to pulm edema  -Cardiology consulted for low EF noted on POCUS  -ECHO reviewed: severely depressed LVEF ~ 15-20% with akinetic/apical wall and basal inferior wall hyperkinesis. Mod TR with mild pulm HTN  -EKG post op NSR with new septal Qs and 1mm DAVID  -trop .32 now downtrending, BNP 6000  -Clinical picture along with EKG changes, mild trop elevation and ECHO findings all consistent with takotsubo CM  -Since takotsubo CM is diagnosis of exclusion , will need to r/o CAD if EF does not improve once clinical status improves  -Plan to repeat echo later next week  -Clinically with sig improvement, alert awake following commands  -Events 3/12 noted with AVNRT in setting of febrile state,  cont metoprolol 25 q 12 and amio 400 mg po bid for now. Will plan to dc amio if no recurrent events on tele  - K > 4.2, Mag > 2

## 2023-03-16 NOTE — PROGRESS NOTE ADULT - SUBJECTIVE AND OBJECTIVE BOX
***********************************************  ADULT NSICU PROGRESS NOTE  JORDAN CHAKRABORTY [id number] [facility] [id number] [id number]  ***********************************************    24H INTERVAL EVENTS:  no acute overnight events.    ROS: negative except per mentioned above in 24h interval events.      VITALS:    ICU Vital Signs Last 24 Hrs  T(C): 36.5 (15 Mar 2023 09:25), Max: 37.6 (14 Mar 2023 13:59)  T(F): 97.7 (15 Mar 2023 09:25), Max: 99.6 (14 Mar 2023 13:59)  HR: 74 (15 Mar 2023 08:40) (70 - 89)  BP: 99/66 (15 Mar 2023 08:00) (99/63 - 115/72)  BP(mean): 78 (15 Mar 2023 08:00) (77 - 89)  ABP: --  ABP(mean): --  RR: 17 (15 Mar 2023 08:40) (16 - 18)  SpO2: 98% (15 Mar 2023 08:40) (91% - 100%)    O2 Parameters below as of 15 Mar 2023 09:00  Patient On (Oxygen Delivery Method): tracheostomy collar            EXAM:     [place] Coma Scale: 15    General: normocephalic, laying in bed, trach to vent, in no distress  Neuro     MS: A/Ox3 (to 'hospital' and appropriate month/year), cooperative with examination, no neglect, comprehension intact,    CN: PERRL, EOMI and no ptosis bilaterally, face symmetric, hearing grossly intact    Mot: bulk normal, tone normal, power 5/5 in bilateral upper and 5/5 in bilateral lower ext    Sens: intact to noxious stimuli in bilateral upper and lower ext  HEENT: tracheostomy to ventilator  Chest: nonlabored respirations, no adventitious lung sounds bilaterally, heart regular rate/rhythm, present S1/S2, no murmurs or rubs  Abdomen: nondistended, soft and nontender without peritoneal signs, normoactive bowel sounds  Extremities: no clubbing, well-perfused, no edema    LABORATORY DATA:                          9.1    7.59  )-----------( 115      ( 15 Mar 2023 04:59 )             27.4     03-15    128<L>  |  93<L>  |  17  ----------------------------<  230<H>  3.8   |  28  |  0.67    Ca    8.6      15 Mar 2023 06:02  Phos  3.1     03-15  Mg     1.6     03-15        MEDICATIONS  (STANDING):  aMIOdarone    Tablet 400 milliGRAM(s) Oral two times a day  atorvastatin 20 milliGRAM(s) Oral at bedtime  cefepime   IVPB 2000 milliGRAM(s) IV Intermittent every 8 hours  chlorhexidine 0.12% Liquid 15 milliLiter(s) Oral Mucosa every 12 hours  chlorhexidine 2% Cloths 1 Application(s) Topical <User Schedule>  enoxaparin Injectable 70 milliGRAM(s) SubCutaneous every 12 hours  escitalopram 5 milliGRAM(s) Oral daily  fluticasone propionate 50 MICROgram(s)/spray Nasal Spray 1 Spray(s) Both Nostrils two times a day  insulin glargine Injectable (LANTUS) 10 Unit(s) SubCutaneous at bedtime  insulin regular  human corrective regimen sliding scale   SubCutaneous every 6 hours  insulin regular  human recombinant 6 Unit(s) SubCutaneous every 6 hours  levETIRAcetam 750 milliGRAM(s) Oral two times a day  magnesium oxide 400 milliGRAM(s) Oral every 12 hours  melatonin 5 milliGRAM(s) Oral at bedtime  metoprolol tartrate 25 milliGRAM(s) Oral every 12 hours  sodium chloride 2 Gram(s) Oral every 12 hours    MEDICATIONS  (PRN):  acetaminophen     Tablet .. 650 milliGRAM(s) Oral every 6 hours PRN Temp greater or equal to 38C (100.4F), Mild Pain (1 - 3)  acetylcysteine 20%  Inhalation 4 milliLiter(s) Inhalation every 6 hours PRN secretions  albuterol/ipratropium for Nebulization 3 milliLiter(s) Nebulizer every 6 hours PRN Shortness of Breath and/or Wheezing  ondansetron Injectable 4 milliGRAM(s) IV Push every 8 hours PRN Nausea and/or Vomiting  sodium chloride 0.9% lock flush 10 milliLiter(s) IV Push every 1 hour PRN Pre/post blood products, medications, blood draw, and to maintain line patency  sodium chloride 3%  Inhalation 4 milliLiter(s) Inhalation every 6 hours PRN secretions      ***********************************************  ASSESSMENT AND PLAN  ***********************************************    #S/p cerebral angio for IA avastin  #Acute HF 2/2 stress cardiomyopathy  #Acute T1 respiratory failure requiring mechanical ventilation  #Supraventricular tachycardia  #Diarrhea  #Metabolic acidosis, DKA - resolved  #Chronic hyponatremia  #Thrombocytopenia likely secondary to avastin  #R. 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 sided weakness post operatively  PULM - trial of trach collar, SpO2 goal > 92%, supplemental O2 and pulm toileting as needed  CARDIO - BP goal NORMOTENSION, MAP > 65, EF = 20%, lopressor, amiodarone 400/400 to be discontinued, daily CXR, cardiology consultation, lasix prn  GI - bowel regimen, stool count, diet: TFs, PPI (home med)  /RENAL - monitor UOP/volume status, BUN/SCr  HEME - maintain Hb > 7.0, PLT > 10,000/20,000 if febrile/50,000 if bleeding, 100,000 if life threatening bleeding  ID - monitor for infectious s/s, fever curve, leukocytosis; CPM 2g q8 for VAP (MSSA/Pseudomonas/serratia)  ENDO - SGlu goal < 200, lantus/regular insulin, endocrine following ***********************************************  ADULT NSICU PROGRESS NOTE  JORDAN CHAKRABORTY [id number] [facility] 08EA 802 01  ***********************************************    24H INTERVAL EVENTS:  no acute overnight events.        ROS: negative except per mentioned above in 24h interval events.      VITALS:    ICU Vital Signs Last 24 Hrs  T(C): 36.2 (16 Mar 2023 13:00), Max: 37.4 (15 Mar 2023 22:14)  T(F): 97.2 (16 Mar 2023 13:00), Max: 99.4 (15 Mar 2023 22:14)  HR: 80 (16 Mar 2023 12:00) (70 - 87)  BP: 112/68 (16 Mar 2023 12:00) (97/57 - 112/68)  BP(mean): 82 (16 Mar 2023 12:00) (71 - 84)  ABP: --  ABP(mean): --  RR: 20 (16 Mar 2023 12:00) (18 - 22)  SpO2: 94% (16 Mar 2023 12:00) (92% - 100%)    O2 Parameters below as of 16 Mar 2023 12:00  Patient On (Oxygen Delivery Method): tracheostomy collar  O2 Flow (L/min): 10  O2 Concentration (%): 40        EXAM:     [place] Coma Scale: 15    General: normocephalic, laying in bed, trach to vent, in no distress  Neuro     MS: A/Ox3 (to 'hospital' and appropriate month/year), cooperative with examination, no neglect, comprehension intact,    CN: PERRL, EOMI and no ptosis bilaterally, face symmetric, hearing grossly intact    Mot: bulk normal, tone normal, power 5/5 in bilateral upper and 5/5 in bilateral lower ext    Sens: intact to noxious stimuli in bilateral upper and lower ext  HEENT: tracheostomy to ventilator  Chest: nonlabored respirations, no adventitious lung sounds bilaterally, heart regular rate/rhythm, present S1/S2, no murmurs or rubs  Abdomen: nondistended, soft and nontender without peritoneal signs, normoactive bowel sounds  Extremities: no clubbing, well-perfused, no edema    LABORATORY DATA:                          8.9    7.10  )-----------( 125      ( 16 Mar 2023 04:55 )             27.0     03-16    129<L>  |  92<L>  |  16  ----------------------------<  203<H>  4.1   |  28  |  0.71    Ca    8.4      16 Mar 2023 04:55  Phos  2.9     03-16  Mg     1.8     03-16        MEDICATIONS  (STANDING):  aMIOdarone    Tablet 400 milliGRAM(s) Oral two times a day  atorvastatin 20 milliGRAM(s) Oral at bedtime  cefepime   IVPB 2000 milliGRAM(s) IV Intermittent every 8 hours  chlorhexidine 0.12% Liquid 15 milliLiter(s) Oral Mucosa every 12 hours  chlorhexidine 2% Cloths 1 Application(s) Topical <User Schedule>  enoxaparin Injectable 70 milliGRAM(s) SubCutaneous every 12 hours  escitalopram 5 milliGRAM(s) Oral daily  fluticasone propionate 50 MICROgram(s)/spray Nasal Spray 1 Spray(s) Both Nostrils two times a day  insulin glargine Injectable (LANTUS) 10 Unit(s) SubCutaneous at bedtime  insulin regular  human corrective regimen sliding scale   SubCutaneous every 6 hours  insulin regular  human recombinant 6 Unit(s) SubCutaneous every 6 hours  levETIRAcetam 750 milliGRAM(s) Oral two times a day  melatonin 5 milliGRAM(s) Oral at bedtime  metoprolol tartrate 25 milliGRAM(s) Oral every 12 hours    MEDICATIONS  (PRN):  acetaminophen     Tablet .. 650 milliGRAM(s) Oral every 6 hours PRN Temp greater or equal to 38C (100.4F), Mild Pain (1 - 3)  acetylcysteine 20%  Inhalation 4 milliLiter(s) Inhalation every 6 hours PRN secretions  albuterol/ipratropium for Nebulization 3 milliLiter(s) Nebulizer every 6 hours PRN Shortness of Breath and/or Wheezing  ondansetron Injectable 4 milliGRAM(s) IV Push every 8 hours PRN Nausea and/or Vomiting  sodium chloride 0.9% lock flush 10 milliLiter(s) IV Push every 1 hour PRN Pre/post blood products, medications, blood draw, and to maintain line patency  sodium chloride 3%  Inhalation 4 milliLiter(s) Inhalation every 6 hours PRN secretions      ***********************************************  ASSESSMENT AND PLAN  ***********************************************    #S/p cerebral angio for IA avastin  #Acute HF 2/2 stress cardiomyopathy  #Acute T1 respiratory failure requiring mechanical ventilation  #Supraventricular tachycardia  #Diarrhea  #Metabolic acidosis, DKA - resolved  #Chronic hyponatremia  #Thrombocytopenia likely secondary to avastin  #R. 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 sided weakness post operatively  PULM - trach exchanged to uncuffed by ENT (3/16/23), SpO2 goal > 92%, supplemental O2 and pulm toileting as needed  CARDIO - BP goal NORMOTENSION, MAP > 65, EF = 20%, lopressor, amiodarone 400/400 to be discontinued on Friday, lasix prn  GI - bowel regimen, stool count, diet: TFs, PPI (home med)  /RENAL - monitor UOP/volume status, BUN/SCr  HEME - maintain Hb > 7.0, PLT > 10,000/20,000 if febrile/50,000 if bleeding, 100,000 if life threatening bleeding  ID - monitor for infectious s/s, fever curve, leukocytosis; CPM 2g q8 for VAP (MSSA/Pseudomonas/serratia)  ENDO - SGlu goal < 200, lantus/regular insulin, endocrine following    ICU stepdown Checklist:    [X] hemodynamically stable – VS WNL and stable x 24hours, UO adequate  [X] if  previously on HDA - off pressors x 24h with stable neuro exam   [X] no new symptoms x 24h (i.e. new fever, new-onset nausea/vomiting)  [X] stable labs: (i.e. WBC not rising, sodium not dropping)  [X] patient not at high risk for aspiration, if high risk then:                  [ ] should have definitive plans for trach/PEG (alternative option is to discharge from ICU to facilty)                  [ ] stepdown to bed close to nurse’s station  [X] low suctioning requirements (i.e. q4h or less)  [X] sign-off from primary RN*  [X] drains do not require ICU level of care  [X] if patient previously agitated or with behavioral issues – controlled  [X] pain controlled

## 2023-03-16 NOTE — SWALLOW FEES ASSESSMENT ADULT - COMMENTS
Risks/benefits/alternatives of FEES were explained to the patient who provided verbal consent to participate. He tolerated the passing and presence of the scope without difficulty. This exam was performed in coordination with Cristine Menezes, SLP, who provided feeding assistance.

## 2023-03-16 NOTE — PROGRESS NOTE ADULT - SUBJECTIVE AND OBJECTIVE BOX
NSCU ATTENDING -- ADDITIONAL PROGRESS NOTE    Nighttime rounds were performed -- please refer to earlier Progress Note for HPI details.      ICU Vital Signs Last 24 Hrs  T(C): 36.8 (16 Mar 2023 17:00), Max: 37.4 (15 Mar 2023 22:14)  T(F): 98.2 (16 Mar 2023 17:00), Max: 99.4 (15 Mar 2023 22:14)  HR: 80 (16 Mar 2023 12:00) (70 - 87)  BP: 112/68 (16 Mar 2023 12:00) (97/57 - 112/68)  BP(mean): 82 (16 Mar 2023 12:00) (71 - 84)  RR: 20 (16 Mar 2023 16:07) (18 - 22)  SpO2: 97% (16 Mar 2023 16:07) (92% - 100%)      03-15-23 @ 07:01  -  03-16-23 @ 07:00  --------------------------------------------------------  IN: 1845 mL / OUT: 1430 mL / NET: 415 mL    03-16-23 @ 07:01  -  03-16-23 @ 19:16  --------------------------------------------------------  IN: 430 mL / OUT: 700 mL / NET: -270 mL      Mode: standby    MEDICATIONS:   acetaminophen     Tablet .. 650 milliGRAM(s) Oral every 6 hours PRN  acetylcysteine 20%  Inhalation 4 milliLiter(s) Inhalation every 6 hours PRN  albuterol/ipratropium for Nebulization 3 milliLiter(s) Nebulizer every 6 hours PRN  aMIOdarone    Tablet 400 milliGRAM(s) Oral two times a day  atorvastatin 20 milliGRAM(s) Oral at bedtime  benzocaine/menthol Lozenge 1 Lozenge Oral daily PRN  cefepime   IVPB 2000 milliGRAM(s) IV Intermittent every 8 hours  chlorhexidine 0.12% Liquid 15 milliLiter(s) Oral Mucosa every 12 hours  chlorhexidine 2% Cloths 1 Application(s) Topical <User Schedule>  enoxaparin Injectable 70 milliGRAM(s) SubCutaneous every 12 hours  escitalopram 5 milliGRAM(s) Oral daily  fluticasone propionate 50 MICROgram(s)/spray Nasal Spray 1 Spray(s) Both Nostrils two times a day  insulin glargine Injectable (LANTUS) 10 Unit(s) SubCutaneous at bedtime  insulin regular  human corrective regimen sliding scale   SubCutaneous every 6 hours  insulin regular  human recombinant 6 Unit(s) SubCutaneous every 6 hours  levETIRAcetam 750 milliGRAM(s) Oral two times a day  melatonin 5 milliGRAM(s) Oral at bedtime  metoprolol tartrate 25 milliGRAM(s) Oral every 12 hours  ondansetron Injectable 4 milliGRAM(s) IV Push every 8 hours PRN  sodium chloride 0.9% lock flush 10 milliLiter(s) IV Push every 1 hour PRN  sodium chloride 3%  Inhalation 4 milliLiter(s) Inhalation every 6 hours PRN     LABS:                      8.9    7.10  )-----------( 125      ( 16 Mar 2023 04:55 )             27.0     03-16    129<L>  |  92<L>  |  16  ----------------------------<  203<H>  4.1   |  28  |  0.71    Ca    8.4      16 Mar 2023 04:55  Phos  2.9     03-16  Mg     1.8     03-16      ASSESSMENT: GBM, s/p IA avastin; complicated by cardiogenic shock, from stress induced cardiomyopathy, pulm edema.       PLAN:   Neuro: Neurochecks Q4 hr   Keppra 750mg Q12 hr. Lexapro  CV: MAP >65. Continue metoprolol 25 mg bid and amiodarone per cardiology, currently in sinus rhythm.  TTE EF 20%. Diuresce prn.   PULM: Trached- on trach collar. Continue to wean. Aggressive chest PT. RTC nebs. Pulm following. Status post trach exchange  GI: On TFs, at goal. LBM- 3/15  Renal: Monitor urine output. Monitor BMPs. Hold salt tabs for now.   ID: VAP- Pseudomonas and staph; on cefepime.   Endo: DM, target sugar 140-180. Lantus 10u, regular 6u  Heme: R IM calf DVT. On lovenox 70mg bid. Anti Xa- 0.48; repeat. LE dopplers no propagation. Chronic.  Thrombocytopenia- Improving, heme on consult; possibly related to avastin.     Not critically ill NSCU ATTENDING -- ADDITIONAL PROGRESS NOTE    Nighttime rounds were performed -- please refer to earlier Progress Note for HPI details.    Shiley changed to cuffless. Passed trial for passy hai valve.     ICU Vital Signs Last 24 Hrs  T(C): 36.8 (16 Mar 2023 17:00), Max: 37.4 (15 Mar 2023 22:14)  T(F): 98.2 (16 Mar 2023 17:00), Max: 99.4 (15 Mar 2023 22:14)  HR: 80 (16 Mar 2023 12:00) (70 - 87)  BP: 112/68 (16 Mar 2023 12:00) (97/57 - 112/68)  BP(mean): 82 (16 Mar 2023 12:00) (71 - 84)  RR: 20 (16 Mar 2023 16:07) (18 - 22)  SpO2: 97% (16 Mar 2023 16:07) (92% - 100%)      03-15-23 @ 07:01  -  03-16-23 @ 07:00  --------------------------------------------------------  IN: 1845 mL / OUT: 1430 mL / NET: 415 mL    03-16-23 @ 07:01  -  03-16-23 @ 19:16  --------------------------------------------------------  IN: 430 mL / OUT: 700 mL / NET: -270 mL      Mode: standby    MEDICATIONS:   acetaminophen     Tablet .. 650 milliGRAM(s) Oral every 6 hours PRN  acetylcysteine 20%  Inhalation 4 milliLiter(s) Inhalation every 6 hours PRN  albuterol/ipratropium for Nebulization 3 milliLiter(s) Nebulizer every 6 hours PRN  aMIOdarone    Tablet 400 milliGRAM(s) Oral two times a day  atorvastatin 20 milliGRAM(s) Oral at bedtime  benzocaine/menthol Lozenge 1 Lozenge Oral daily PRN  cefepime   IVPB 2000 milliGRAM(s) IV Intermittent every 8 hours  chlorhexidine 0.12% Liquid 15 milliLiter(s) Oral Mucosa every 12 hours  chlorhexidine 2% Cloths 1 Application(s) Topical <User Schedule>  enoxaparin Injectable 70 milliGRAM(s) SubCutaneous every 12 hours  escitalopram 5 milliGRAM(s) Oral daily  fluticasone propionate 50 MICROgram(s)/spray Nasal Spray 1 Spray(s) Both Nostrils two times a day  insulin glargine Injectable (LANTUS) 10 Unit(s) SubCutaneous at bedtime  insulin regular  human corrective regimen sliding scale   SubCutaneous every 6 hours  insulin regular  human recombinant 6 Unit(s) SubCutaneous every 6 hours  levETIRAcetam 750 milliGRAM(s) Oral two times a day  melatonin 5 milliGRAM(s) Oral at bedtime  metoprolol tartrate 25 milliGRAM(s) Oral every 12 hours  ondansetron Injectable 4 milliGRAM(s) IV Push every 8 hours PRN  sodium chloride 0.9% lock flush 10 milliLiter(s) IV Push every 1 hour PRN  sodium chloride 3%  Inhalation 4 milliLiter(s) Inhalation every 6 hours PRN      LABS:                      8.9    7.10  )-----------( 125      ( 16 Mar 2023 04:55 )             27.0     03-16    129<L>  |  92<L>  |  16  ----------------------------<  203<H>  4.1   |  28  |  0.71    Ca    8.4      16 Mar 2023 04:55  Phos  2.9     03-16  Mg     1.8     03-16      ASSESSMENT: GBM, s/p IA avastin; complicated by cardiogenic shock, from stress induced cardiomyopathy, pulm edema.     PLAN:   Neuro: Neurochecks Q4 hr   Keppra 750mg Q12 hr. Lexapro.  CV: MAP >65. Continue metoprolol 25 mg bid and amiodarone per cardiology, currently in sinus rhythm. Likely DC amiodarone 3/17  TTE EF 20%. Repeat next week. Diuresce prn.   PULM: Trached- on trach collar. Continue to wean. Aggressive chest PT. RTC nebs. Pulm following. Status post trach exchange  GI: On TFs, at goal. LBM- 3/15. Check LFTs.   Renal: Monitor urine output. Monitor BMPs. Hold salt tabs for now. Na goal 125-130.   ID: VAP- Pseudomonas and staph; on cefepime.   Endo: DM, target sugar 140-180. Lantus 10u, regular 5u, prandials. Endocrine following  HEME: R IM calf DVT. On lovenox 75mg bid. Anti Xa-repeat. LE dopplers no propagation. Chronic.  Thrombocytopenia- Improving, heme on consult; possibly related to avastin.     Not critically ill

## 2023-03-16 NOTE — CHART NOTE - NSCHARTNOTEFT_GEN_A_CORE
ENT exchanged to 6 shiley cuffless. FEES done, able to tolerate regular diet with thin liquids. Neuro exam stable. Pending stepdown bed.

## 2023-03-16 NOTE — PROGRESS NOTE ADULT - NS MD NEURO CONDITIONS_HEART2
Systolic (HFrEF)

## 2023-03-16 NOTE — PROGRESS NOTE ADULT - ATTENDING COMMENTS
Pt seen on rounds this afternoon.  Continues to tolerate the tube feeds without nausea or fullness, and has not had any diarrhea since the feeds were changed to Vital.  Will be started on a diet with supper tonight, but the plan is to continue the feeds overnight.  --Not being sure about how much he will eat at supper, and with the feeds to be continued, would give the usual nutritional regular insulin at 6 PM  --Continue the regular insulin q6h as long as the feeds are continued (except to hold the 6 AM regular insulin tomorrow if the feeds are going to be stopped at some point between 6 PM and noon)  --At whatever point the feeds are stopped and he is only on the PO diet, start 4 lispro pre-meal  --Continue the Lantus at 10 units hs even after feeds discontinued  --Plans discussed with Neurosurg staff

## 2023-03-16 NOTE — PROGRESS NOTE ADULT - SUBJECTIVE AND OBJECTIVE BOX
HPI:  69 y/o right handed male with pmhx of T2DM, HLD, JAGDISH, hyponatremia, tracheal stenosis s/p tracheostomy, and glioblastoma presents for cerebral angiogram with Avastin treatment.    Daughter states in January 2022, patient had onset of confusion, dizziness, and speech difficulty while in [place] and was diagnosed with brain mass. Pt evaluated upon return to the [place] at [facility] ED with CT head revealing left frontal brain mass. Pt underwent resection of left frontal enhancing tumor with GLEOLAN placed 1/27/2022. Path showed gliosarcoma, WHO grade IV, IDH wild-type, EGFR non amplified, MGMT promotor methylated. Pt was discharged to rehab and underwent radiation treatment and Temodar chemotherapy (completed 12/22). MRI 12/22 showed recurrence of brain mass and pt presents today referred by Dr. Mackenzie for trial participation cerebral angiogram with Avastin treatment.    Pt complains of dizziness. Denies headache, nausea, vomiting, weakness, numbness, chest pain, dyspnea.   (07 Mar 2023 12:30)    OVERNIGHT EVENTS: ANA LILIA, neuro stable, vitals stable.     Hospital Course:   3/7: POD0 cerebral angiogram IA avastin. post op in cath lab hypertensive, +flash pulmonary edema w/ desaturation, given diuretics. on cpap   3/8: POD1. o/n new global aphasia, right side w/d. CTH/CTA/CTP performed. ABG o/n with metabolic acidosis, lactate 7.9 and hyperglycemic 320s. started on insulin gtt, 1L NS bolus and NS@200cc/hr. desatting on cpap, placed on full vent support. propofol for sedation. in SVT, resolved with 5 lopresor. fluids stopped due to worsening CXR. repeat lactate 12, given 1amp bicarb then started on bicarb gtt. POCUS this am with hypokinetic left ventricle, pending echo. Cards/nephro/ endo consulted for further recs. Insulin gtt dc'd. VEEG monitoring. S/p 3%Na.  SQL started tonight. S/p 10 NPH after FS of 166. steven gtt. S/p 40 IV lasix. Troponin downtrending 0.32 to 0.18. EEG negative for seizures. Pancultured spiked 101.5. UA negative. Bicarb gtt dc'd. TTE EF15-20%, akinetic apex and midsegments suggestive of takotsubo  3/9: POD2. ANA LILIA o/n neuro stable. remains sedated on propofol, on levo gtt. Propofol dc'd. s/p lasix 40 mg IV x 1. CXR L effusion improved. Started lantus 10u at bedtime per endocrine. Campuzano removed, f/u TOV.    3/10: POD3 ANA LILIA overnight. Neuro exam stable. multiple bouts of diarrhea overnight and AM. Tube feeds held. s/p albumin 250 cc +  cc for tachycardia in the setting of negative fluid status. Tolerating CPAP. Tolerating trach collar x 3 hours, switched back to CPAP overnight. Off levo gtt.   3/11: POD4 Episode of O2 desaturation to low 90s while on 40% FiO2. FiO2 increased to 60% briefly with improvement in O2, good breath sounds throughout, Pt denies SOB and CXR stable. Neuro exam stable. CXR with worsening congestion, given Lasix 40mg IV, on full vent support. Heme consulted for thrombocytopenia. Fever 101F. Episode of SVT with HR to 180s and hypotensive to 80s. Started on Steven, EKG and IV tylenol. Cards fellow called to bedside. Lopressor 5mg IV given with good resolution. Vital signs normalized. Patient remained neuro intact. Started on maintenance lopressor 12.5bid and amio gtt per cards. Given another Lasix 40mg IV at 6pm. Added regular insulin 2 units q 6 per endo.  3/12: POD5 Pt remains on full vent support. Neuro exam stable. Spiked 101.7F fever, pan cultured. Started on vanco/cefepine for empiric coverage. Lasix 40mg IV given in afternoon. Started on IV iron x 3 days, per heme. Regular insulin increased to 6q6, per endo. Amio PO, increased lopressor to 25bid, per cards.  3/13: POD 6. ANA LILIA overnight, exam stable, c-diff sent for multiple loose BMs/febrile/off bowel regimen since 3/7, negative. low grade fever overnight 100.2 pan cx yesterday for fever 101.7. C. diff negative. Cefepime x 7 d for psudomonal PNA. Repeat echo done, EF 20%. Osmolite changed to Vital per endo recs for loose stools.   3/14: POD7. ANA LILIA o/n neuro stable. started on full dose SQL for DVT. 20mg lasix given for diuresis. LE dopplers showing chronic R IM calf DVT. Tolerating trach collar. Restarted his home lexapro for agitation. Increased regular insulin to 6 units. Pocus w/ several b-lines given 40 mg lasix, repeat chest xray in am. Regular insulin held for sugar 89.   3/15: POD 8 IA avastin. Overnight Increased regular insulin to 6 units. Pocus w/ several b-lines given 40 mg lasix, repeat chest xray in am. Regular insulin held for sugar 89.   CXR this mornng with pulm congestion, given lasix 40mg IV. Speech/swallow pathologist evaluated today. Plan for FEES tomorrow. Per endo, 4 regular insulin given.  3/16: POD9, overnight dc'd salt tabs, anti-xa 0.4 will recheck after next 2 doses.     Vital Signs Last 24 Hrs  T(C): 37.2 (16 Mar 2023 01:00), Max: 37.4 (15 Mar 2023 22:14)  T(F): 98.9 (16 Mar 2023 01:00), Max: 99.4 (15 Mar 2023 22:14)  HR: 78 (16 Mar 2023 01:00) (70 - 87)  BP: 97/57 (16 Mar 2023 01:00) (97/57 - 105/64)  BP(mean): 71 (16 Mar 2023 01:00) (71 - 80)  RR: 20 (16 Mar 2023 01:00) (16 - 20)  SpO2: 92% (16 Mar 2023 01:00) (92% - 100%)    Parameters below as of 16 Mar 2023 01:00  Patient On (Oxygen Delivery Method): tracheostomy collar  O2 Flow (L/min): 10  O2 Concentration (%): 40    I&O's Summary    14 Mar 2023 07:01  -  15 Mar 2023 07:00  --------------------------------------------------------  IN: 1820 mL / OUT: 1830 mL / NET: -10 mL    15 Mar 2023 07:01  -  16 Mar 2023 04:06  --------------------------------------------------------  IN: 735 mL / OUT: 1000 mL / NET: -265 mL        PHYSICAL EXAM:  General: patient seen laying supine in bed in NAD  Neuro: AAOx3 (Maltese speaking), FC, OE spontaneously, speech clear, CNII-XI grossly intact, face symmetric, no pronator drift, strength 5/5 b/l UE and LE  HEENT: PERRL, EOMI  Pulmonary: chest rise symmetric  Abdomen: soft, nontender, nondistended  Ext: perfusing well  Skin: warm, dry  Wound: R groin site c/d/i     TUBES/LINES:  [] Campuzano  [] Lumbar Drain  [] Wound Drains  [] Others      DIET:  [] NPO  [] Mechanical  [x] Tube feeds - NGT    LABS:                        9.1    7.59  )-----------( 115      ( 15 Mar 2023 04:59 )             27.4     03-15    128<L>  |  93<L>  |  17  ----------------------------<  230<H>  3.8   |  28  |  0.67    Ca    8.6      15 Mar 2023 06:02  Phos  3.1     03-15  Mg     1.6     03-15              CAPILLARY BLOOD GLUCOSE      POCT Blood Glucose.: 105 mg/dL (15 Mar 2023 23:02)  POCT Blood Glucose.: 83 mg/dL (15 Mar 2023 18:07)  POCT Blood Glucose.: 158 mg/dL (15 Mar 2023 13:12)  POCT Blood Glucose.: 242 mg/dL (15 Mar 2023 05:42)      Drug Levels: [] N/A    CSF Analysis: [] N/A      Allergies    amoxicillin (Rash)    Intolerances      MEDICATIONS:  Antibiotics:  cefepime   IVPB 2000 milliGRAM(s) IV Intermittent every 8 hours    Neuro:  acetaminophen     Tablet .. 650 milliGRAM(s) Oral every 6 hours PRN  escitalopram 5 milliGRAM(s) Oral daily  levETIRAcetam 750 milliGRAM(s) Oral two times a day  melatonin 5 milliGRAM(s) Oral at bedtime  ondansetron Injectable 4 milliGRAM(s) IV Push every 8 hours PRN    Anticoagulation:  enoxaparin Injectable 70 milliGRAM(s) SubCutaneous every 12 hours    OTHER:  acetylcysteine 20%  Inhalation 4 milliLiter(s) Inhalation every 6 hours PRN  albuterol/ipratropium for Nebulization 3 milliLiter(s) Nebulizer every 6 hours PRN  aMIOdarone    Tablet 400 milliGRAM(s) Oral two times a day  atorvastatin 20 milliGRAM(s) Oral at bedtime  chlorhexidine 0.12% Liquid 15 milliLiter(s) Oral Mucosa every 12 hours  chlorhexidine 2% Cloths 1 Application(s) Topical <User Schedule>  fluticasone propionate 50 MICROgram(s)/spray Nasal Spray 1 Spray(s) Both Nostrils two times a day  insulin glargine Injectable (LANTUS) 10 Unit(s) SubCutaneous at bedtime  insulin regular  human corrective regimen sliding scale   SubCutaneous every 6 hours  insulin regular  human recombinant 6 Unit(s) SubCutaneous every 6 hours  metoprolol tartrate 25 milliGRAM(s) Oral every 12 hours  sodium chloride 3%  Inhalation 4 milliLiter(s) Inhalation every 6 hours PRN    IVF:    CULTURES:  Culture Results:   No growth at 3 days. (03-12 @ 12:22)  Culture Results:   No growth at 3 days. (03-12 @ 12:22)      ASSESSMENT:  69 y/o right handed male with pmhx of T2DM, HLD, JAGDISH, hyponatremia, tracheal stenosis s/p tracheostomy, and glioblastoma now s/p cerebral angiogram with Avastin treatment (3/7/23). c/b cardiogenic shock, cardiomyopathy, improving.     C71.9    Abnormal electrocardiogram [ECG] [EKG]    Allergy, unspecified, initial encounter    Anemia, unspecified    Benign prostatic hyperplasia without lower urinary tract symptoms    Calculus of kidney    Cough, unspecified    Depression, unspecified    ENCOUNTER FOR ATTENT    Encounter for general adult medical examination without abnormal findings    Encounter for immunization    Encounter for immunization safety counseling    Encounter for other preprocedural examination    Encounter for screening for malignant neoplasm of prostate    Encounter for screening for other metabolic disorders    Encounter for screening for other viral diseases    ESSENTIAL (PRIMARY)    Gastro-esophageal reflux disease without esophagitis    Generalized anxiety disorder    History of Glioblastoma    Hyperlipidemia, unspecified    Hypo-osmolality and hyponatremia    Hypotension, unspecified    Iron deficiency    Iron deficiency anemia, unspecified    LONG TERM (CURRENT)    LONG TERM (CURRENT)    LONG TERM (CURRENT)    Malignant neoplasm of brain, unspecified    Nausea with vomiting, unspecified    Other constipation    Other fatigue    Other injury of unspecified body region, initial encounter    OTHER SPECIFIED DISE    Personal history of malignant neoplasm, unspecified    Personal history of other diseases of the nervous system and sense organs    Personal history of other endocrine, nutritional and metabolic disease    Personal history of other mental and behavioral disorders    Personal history of transient ischemic attack (TIA), and cerebral infarction without residual deficits    POSTPROCEDURAL SUBGL    Pruritus, unspecified    Shortness of breath    Tachycardia, unspecified    Thrombocytopenia, unspecified    Unspecified abdominal pain    Unspecified visual disturbance    Urinary calculus, unspecified    Vitamin D deficiency, unspecified    No pertinent family history in first degree relatives    FH: diabetes mellitus (Father, Mother)    FH: hyperlipidemia (Father)    FH: hypertension (Father, Mother)    Handoff    No pertinent past medical history    Diabetes mellitus    Hypertension    Glioblastoma    Type 2 diabetes mellitus    Hyperlipidemia    Iron deficiency anemia    Nephrolithiasis    Thrombocytopenia    Hyponatremia    BPH (benign prostatic hyperplasia)    No pertinent past medical history    GBM (glioblastoma multiforme)    GBM (glioblastoma multiforme)    Acute respiratory failure with hypoxia    Tracheostomy status    Acute hyponatremia    Angiogram, carotid and cerebral arteries    No significant past surgical history    No significant past surgical history    S/P craniotomy    H/O tracheostomy    No significant past surgical history    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        PLAN:  Neuro   - Vitals/neuro q4   - CTA/CTP/repeat CTH negative   - Pain control   - Cont home Keppra 750 BID   - MRI w/wo on stepdown   - continue home med: Continue Lexapro 5 mg daily     Cards  - SBP    - TTE EF 15-20%, akinetic apex and midsegments suggestive of Takotsubo  - Repeat echo 3/13, EF 20%   - Per cards: started on lopressor 25 BID, Amio  mg BID - Dc Friday 3/17    Pulm  - + Trach collar   - Duonebs, mucomyst, saline nebs for secretions prn   - Pulm following, signed off     GI  - NGT - Vital 1.5 @ 65 + banatrol d/t diarrhea   - Bowel regimen held for loose stool, + banatrol, last BM 3/14     RENAL:   - Chronic hyponatremia   - NaCl 2 g q 12 dc'd  - Voiding     HEME:  - DVT ppx: SQL 70 bid, SCD L side only  - LE dopplers + R IM calf DVT, 3/14 chronic R IM calf DVT  - Heme consulted for thrombocytopenia - s/p iron x 3 days    ID:   - Last panculture 3/12, Sputum psudomonas + staph aureus, +serratia marcesans   - Cefepime 2 g q 8 (3/12 - 3/18) x 7 d   - C.diff negative      ENDO:   - DKA resolved    - DM: A1C 5.8, low dose ISS   - Lantus 10 U at bedtime, Reg insulin 6 u q 6h     DISPO:   - ICU status, full code, dispo pending     D/W Dr. Ho and Dr. Huggins      Assessment:  Present when checked    []  GCS  E   V  M     Heart Failure: []Acute, [] acute on chronic , []chronic  Heart Failure:  [] Diastolic (HFpEF), [] Systolic (HFrEF), []Combined (HFpEF and HFrEF), [] RHF, [] Pulm HTN, [] Other    [] EUGENIA, [] ATN, [] AIN, [] other  [] CKD1, [] CKD2, [] CKD 3, [] CKD 4, [] CKD 5, []ESRD    Encephalopathy: [] Metabolic, [] Hepatic, [] toxic, [] Neurological, [] Other    Abnormal Nurtitional Status: [] malnurtition (see nutrition note), [ ]underweight: BMI < 19, [] morbid obesity: BMI >40, [] Cachexia    [] Sepsis  [] hypovolemic shock,[] cardiogenic shock, [] hemorrhagic shock, [] neuogenic shock  [] Acute Respiratory Failure  []Cerebral edema, [] Brain compression/ herniation,   [] Functional quadriplegia  [] Acute blood loss anemia

## 2023-03-16 NOTE — CONSULT NOTE ADULT - ASSESSMENT
-------------------------------  ASSESSMENT/PLAN:    IMPRESSION: JORDAN CHAKRABORTY  is a 68y Male right handed male with pmhx of T2DM, HLD, JAGDISH, hyponatremia, tracheal stenosis s/p tracheostomy, and glioblastoma presents for cerebral angiogram with Avastin treatment. Primary team request trach change back to ciffless. Trach change was uncomplicated        RECOMMENDATIONS:  - Continue care as per primary team  - We educated patient's family of trach education  - Please reconsult as needed   ---  Thank you for the kind referral and for allowing me to share in the care of JORDAN CHAKRABORTY If you have any questions, please do not hesitate to contact me.     Sincerely,  Herminia Kapoor PA-C  03-16-23 @ 15:15

## 2023-03-16 NOTE — SWALLOW FEES ASSESSMENT ADULT - RECOMMENDED FEEDING/EATING TECHNIQUES
PMV in place with PO/allow for swallow between intakes/alternate food with liquid/oral hygiene/position upright (90 degrees)/small sips/bites

## 2023-03-16 NOTE — SWALLOW FEES ASSESSMENT ADULT - PHARYNGEAL PHASE COMMENTS
Pharyngeal swallow trigger was timely. No aspiration or airway invasion noted during this study. Mild residue in the pyriform sinuses with first trial of thin, however complete pharyngeal clearance noted with subsequent trials.

## 2023-03-16 NOTE — CHART NOTE - NSCHARTNOTEFT_GEN_A_CORE
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     PO Intake: Good (%) [   ]  Fair (50-75%) [   ] Poor (<25%) [   ]- N/A    GI Issues:   WDL, last BM 3/16  No n/v/d/c- loose stools improved  No abd distention    Pain:  No pain noted at this time    Skin Integrity:  Surgical incision, diana score 20  No edema noted  No pressure ulcers noted    Labs:   03-16    129<L>  |  92<L>  |  16  ----------------------------<  203<H>  4.1   |  28  |  0.71    Ca    8.4      16 Mar 2023 04:55  Phos  2.9     03-16  Mg     1.8     03-16    CAPILLARY BLOOD GLUCOSE    POCT Blood Glucose.: 141 mg/dL (16 Mar 2023 12:01)  POCT Blood Glucose.: 193 mg/dL (16 Mar 2023 04:58)  POCT Blood Glucose.: 105 mg/dL (15 Mar 2023 23:02)  POCT Blood Glucose.: 83 mg/dL (15 Mar 2023 18:07)  POCT Blood Glucose.: 158 mg/dL (15 Mar 2023 13:12)    Medications:  MEDICATIONS  (STANDING):  aMIOdarone    Tablet 400 milliGRAM(s) Oral two times a day  atorvastatin 20 milliGRAM(s) Oral at bedtime  cefepime   IVPB 2000 milliGRAM(s) IV Intermittent every 8 hours  chlorhexidine 0.12% Liquid 15 milliLiter(s) Oral Mucosa every 12 hours  chlorhexidine 2% Cloths 1 Application(s) Topical <User Schedule>  enoxaparin Injectable 70 milliGRAM(s) SubCutaneous every 12 hours  escitalopram 5 milliGRAM(s) Oral daily  fluticasone propionate 50 MICROgram(s)/spray Nasal Spray 1 Spray(s) Both Nostrils two times a day  insulin glargine Injectable (LANTUS) 10 Unit(s) SubCutaneous at bedtime  insulin regular  human corrective regimen sliding scale   SubCutaneous every 6 hours  insulin regular  human recombinant 6 Unit(s) SubCutaneous every 6 hours  levETIRAcetam 750 milliGRAM(s) Oral two times a day  melatonin 5 milliGRAM(s) Oral at bedtime  metoprolol tartrate 25 milliGRAM(s) Oral every 12 hours    MEDICATIONS  (PRN):  acetaminophen     Tablet .. 650 milliGRAM(s) Oral every 6 hours PRN Temp greater or equal to 38C (100.4F), Mild Pain (1 - 3)  acetylcysteine 20%  Inhalation 4 milliLiter(s) Inhalation every 6 hours PRN secretions  albuterol/ipratropium for Nebulization 3 milliLiter(s) Nebulizer every 6 hours PRN Shortness of Breath and/or Wheezing  ondansetron Injectable 4 milliGRAM(s) IV Push every 8 hours PRN Nausea and/or Vomiting  sodium chloride 0.9% lock flush 10 milliLiter(s) IV Push every 1 hour PRN Pre/post blood products, medications, blood draw, and to maintain line patency  sodium chloride 3%  Inhalation 4 milliLiter(s) Inhalation every 6 hours PRN secretions    Admitting Anthropometrics:  Height for BMI (FEET)	5 Feet  Height for BMI (INCHES)	7 Inch(s)  Height for BMI (CENTIMETERS)	170.18 Centimeter(s)  Weight for BMI (lbs)	157 lb  Weight for BMI (kg)	71.2 kg  Body Mass Index	24.5     Weight Change:   No new weights obtained during this admission. please cont to trend weights when feasible    Nutrition Focused Physical Exam: Completed [   ]  Not Pertinent [ x  ]    Estimated energy needs:   IBW used for calculations as pt >100% of IBW (106%). Needs adjusted for critical care requiring vent support, advanced age. Fluid per team.  Kcal (25-30 kcal/kg): 0818-1790 kcal  Protein (1.4-1.6 g/kg): 93-107g pro    Subjective:   69 y/o right handed male with pmhx of T2DM, HLD, JAGDISH, hyponatremia, tracheal stenosis s/p tracheostomy, and glioblastoma now s/p cerebral angiogram with Avastin treatment (3/7). c-diff sent for multiple loose BMs/febrile/off bowel regimen since 3/7- came back negative. S/p SLP eval 3/15- plan for follow up FEES 3/16.     On assessment, pt resting in bed on trach collar. Currently remains NPO with EN feeds- EN regimen was updated to prevent overfeeding. No n/v/c. Loose stools appear to have improved from previous assessment. No abd distention or discomfort noted. Education deferred at this time. Please see nutr recs below.    Previous Nutrition Diagnosis: Inadequate oral intake RT inability to meet est needs via PO AEB NPO, reliant on EN feeds to meet 100% of est needs    Active [   ]  Resolved [ x  ]    If resolved, new PES:     Goal:  Consistently meet >75% est needs via feasible route    Recommendations:  1. Cont with current EN regimen: Osmolite 1.2 @ 65 ml/hr x24hrs via NGT with x1 LPS to provide 1560 ml TV, 1972 kcal, 101 g pro, 1258 ml free water.   >> Recommend addition of Banatrol TID  >> FWF per team  >> Maintain aspiration precautions at all times  2. Pain and bowel regimen per team   3. Cont to monitor lytes and replete prn   4. RD diet edu prn    Education:   Deferred at this time    Risk Level: High [ x  ] Moderate [   ] Low [   ].

## 2023-03-16 NOTE — SWALLOW FEES ASSESSMENT ADULT - DIAGNOSTIC IMPRESSIONS
Functional oropharyngeal swallow tameka by adequate swallow efficiency and airway protection. Pt appears safe for a regular/thin liquid diet. Recommend keep PMV in place during PO, and maintain aspiration precautions.

## 2023-03-16 NOTE — PROGRESS NOTE ADULT - SUBJECTIVE AND OBJECTIVE BOX
Cardiology Consult      Interval History: no events on tele, no acute events overnight      OBJECTIVE  Vitals:  T(C): 36.2 (03-16-23 @ 13:00), Max: 37.4 (03-15-23 @ 22:14)  HR: 80 (03-16-23 @ 12:00) (70 - 87)  BP: 112/68 (03-16-23 @ 12:00) (97/57 - 112/68)  RR: 20 (03-16-23 @ 12:00) (18 - 22)  SpO2: 94% (03-16-23 @ 12:00) (92% - 100%)  Wt(kg): --    I/O:  I&O's Summary    15 Mar 2023 07:01  -  16 Mar 2023 07:00  --------------------------------------------------------  IN: 1845 mL / OUT: 1430 mL / NET: 415 mL    16 Mar 2023 07:01  -  16 Mar 2023 14:42  --------------------------------------------------------  IN: 430 mL / OUT: 200 mL / NET: 230 mL        PHYSICAL EXAM:  Appearance: NAD.   HEENT: No pallor noted.    Cardiovascular: RRR with no murmurs.  Respiratory: Lungs CTAB.   Gastrointestinal:  Soft  Extremities: No edema b/l.    	  LABS:                        8.9    7.10  )-----------( 125      ( 16 Mar 2023 04:55 )             27.0     03-16    129<L>  |  92<L>  |  16  ----------------------------<  203<H>  4.1   |  28  |  0.71    Ca    8.4      16 Mar 2023 04:55  Phos  2.9     03-16  Mg     1.8     03-16            RADIOLOGY & ADDITIONAL TESTS:  Reviewed .    MEDICATIONS  (STANDING):  aMIOdarone    Tablet 400 milliGRAM(s) Oral two times a day  atorvastatin 20 milliGRAM(s) Oral at bedtime  cefepime   IVPB 2000 milliGRAM(s) IV Intermittent every 8 hours  chlorhexidine 0.12% Liquid 15 milliLiter(s) Oral Mucosa every 12 hours  chlorhexidine 2% Cloths 1 Application(s) Topical <User Schedule>  enoxaparin Injectable 70 milliGRAM(s) SubCutaneous every 12 hours  escitalopram 5 milliGRAM(s) Oral daily  fluticasone propionate 50 MICROgram(s)/spray Nasal Spray 1 Spray(s) Both Nostrils two times a day  insulin glargine Injectable (LANTUS) 10 Unit(s) SubCutaneous at bedtime  insulin regular  human corrective regimen sliding scale   SubCutaneous every 6 hours  insulin regular  human recombinant 6 Unit(s) SubCutaneous every 6 hours  levETIRAcetam 750 milliGRAM(s) Oral two times a day  melatonin 5 milliGRAM(s) Oral at bedtime  metoprolol tartrate 25 milliGRAM(s) Oral every 12 hours    MEDICATIONS  (PRN):  acetaminophen     Tablet .. 650 milliGRAM(s) Oral every 6 hours PRN Temp greater or equal to 38C (100.4F), Mild Pain (1 - 3)  acetylcysteine 20%  Inhalation 4 milliLiter(s) Inhalation every 6 hours PRN secretions  albuterol/ipratropium for Nebulization 3 milliLiter(s) Nebulizer every 6 hours PRN Shortness of Breath and/or Wheezing  ondansetron Injectable 4 milliGRAM(s) IV Push every 8 hours PRN Nausea and/or Vomiting  sodium chloride 0.9% lock flush 10 milliLiter(s) IV Push every 1 hour PRN Pre/post blood products, medications, blood draw, and to maintain line patency  sodium chloride 3%  Inhalation 4 milliLiter(s) Inhalation every 6 hours PRN secretions

## 2023-03-16 NOTE — PROGRESS NOTE ADULT - THIS PATIENT HAS THE FOLLOWING CONDITION(S)/DIAGNOSES ON THIS ADMISSION:
Physical Therapy  Name: Serafin Nur MRN: [id number] :   1961   Date:  1/10/2022   Admission Date: 2022 Room:  [address] [place], [place] [id number]   Restrictions/Precautions:        WBAT with walking boot on the R  History, copied from [facility]  [place]:  The primary encounter diagnosis was Fall from bed, initial encounter. A diagnosis of Closed fracture of distal end of right fibula, unspecified fracture morphology, initial encounter was also pertinent to this visit. Patient  has a past medical history of ADHD, COPD (chronic obstructive pulmonary disease) ([facility] COVID-19, [facility] Hep C w/o coma, chronic ([facility] Pacemaker, Sleep apnea, and TIA (transient ischemic attack). Patient  has a past surgical history that includes  section (); Cholecystectomy (); and Pacemaker insertion ().  Communication with other providers:  Lima Martinez RN states pt is ok to see for therapy, co-treat with OT for safety d/t pt's Knees known to buckle with tremors  Subjective:  Patient states:  She has numbness in her L hip to knee  Pain:   Location, Type, Intensity (0/10 to 10/10):  uncomfortable strap on her walking boot  Objective:    Observation:  Pt was in the bed with her c-pap on  Treatment, including education/measures:  Transfers with line management of O2  Rolling: Ind  Donned pt's boot for her  Supine to sit :SBA  Sit to supine :min A of 1, boot removed once in the bedScooting :to EOB was SBA  Pt sat at the EOB for 10 minutes. Sit to stand :min A of 2 with max VC's for hand placements  Stand to sit :min A of 2 with max VC's for hand placements  Gait:  Pt amb with RW for 3 ft with CGA x 2 for safety pt trans <> to [facility].   Pt needed VC's for WBAT on the R LE with boot on  Gait Comments: with VC's' and TC's for B LE placement, walker placement and sequence throughout ambulation; with VC's and TC's to maintain upright posture in order to avoid COM shifting outside of LEE; with VC's for PLB throughout ambulation  Safety  Patient left safely in the bed d/t no room chair available, with call light/phone in reach with alarm applied. Gait belt was used for transfers and gait. Assessment / Impression:     Patient's tolerance of treatment: pt is very apprehensive to WB on her R LE with the boot on d/t nursing telling pt to be non-weight bearing  Adverse Reaction: none  Significant change in status and impact:  none  Barriers to improvement:  B knees buckling, tremors and   Plan for Next Session:    Will cont to work towards pt's goals per her tolerance  Time in: 1559  Time out:  1640  Timed treatment minutes:  41  Total treatment time:  41  Previously filed items:  Social/Functional History  Lives With: Alone (has caregiver for 7 hours/day 7 days/week)  Type of Home: [address] [place] [address] Drive: One level (2nd floor apartment with elevator)  Home Access: Level entry,Elevator  Bathroom Shower/Tub: Tub/Shower unit  Bathroom Toilet: Standard  Bathroom Equipment: Shower chair  Bathroom Accessibility: Accessible  Home Equipment: 4 wheeled [address] [facility] Help From: Personal care attendant  ADL Assistance: [facility]: Needs assistance  Homemaking Responsibilities: No (caregiver manages)  Ambulation Assistance: Independent (mod I with 4ww household distances)  Transfer Assistance: Independent  Active : Yes (daughter is primary )  Occupation: On disability  Additional Comments: Pt endorses significant functional decline over the past couple weeks with new tremors/jerking and fall resulting in current hospital admission  Short term goals  Time Frame for Short term goals: 1 week  Short term goal 1: Pt will perform STS from EOB to RW with Min A, boot on, v/c. Short term goal 2: Pt will demonstrate good carryover with placement/adjustment of walking boot, Mod I. Short term goal 3: Pt will AMB x10 ft with chair follow, RW, Min A. Electronically signed by:     Teresa Moreno None PTA  1/10/2022, 4:37 PM

## 2023-03-16 NOTE — CONSULT NOTE ADULT - SUBJECTIVE AND OBJECTIVE BOX
OTOLARYNGOLOGY (ENT) CONSULTATION NOTE    PATIENT: JORDAN CHAKRABORTY     MRN: [id number]       : 57  DATE OF ADMISSION:23  DATE OF SERVICE:  23 @ 15:15    CHIEF COMPLAINT:  trach change     HISTORY OF PRESENT ILLNESS:  JORDAN CHAKRABORTY  is a 68y Male right handed male with pmhx of T2DM, HLD, JAGDISH, hyponatremia, tracheal stenosis s/p tracheostomy, and glioblastoma presents for cerebral angiogram with Avastin treatment. Patient's tracheostomy tube was changed to a cuffed when he was in the OR. Patient's family is requesting change back to a cuffless. ENT consulted. Patient does not require any additional ventilation or additional procedures requiring intubation. Patient denies any SOB difficulty breathing or increased secretions.       PAST MEDICAL HISTORY:  No pertinent past medical history    Diabetes mellitus    Hypertension    Glioblastoma    Type 2 diabetes mellitus    Hyperlipidemia    Iron deficiency anemia    Nephrolithiasis    Thrombocytopenia    Hyponatremia    BPH (benign prostatic hyperplasia)        CURRENT MEDICATIONS   acetaminophen     Tablet .. 650 Oral PRN  acetylcysteine 20%  Inhalation 4 Inhalation PRN  albuterol/ipratropium for Nebulization 3 Nebulizer PRN  aMIOdarone    Tablet 400 Oral  atorvastatin 20 Oral  cefepime   IVPB 2000 IV Intermittent  chlorhexidine 0.12% Liquid 15 Oral Mucosa  enoxaparin Injectable 70 SubCutaneous  escitalopram 5 Oral  insulin glargine Injectable (LANTUS) 10 SubCutaneous  insulin regular  human corrective regimen sliding scale  SubCutaneous  insulin regular  human recombinant 6 SubCutaneous  levETIRAcetam 750 Oral  melatonin 5 Oral  metoprolol tartrate 25 Oral  sodium chloride 3%  Inhalation 4 Inhalation PRN      HOME MEDICATIONS:  acetaminophen 325 mg oral tablet  aspirin 81 mg oral tablet  levETIRAcetam 750 mg oral tablet  melatonin 3 mg oral tablet  polyethylene glycol 3350 oral powder for reconstitution  senna oral tablet      ALLERGIES:  amoxicillin (Rash)    SOCIAL HISTORY: Pertinent included in HPI   FAMILY HISTORY  No pertinent family history in first degree relatives    FH: diabetes mellitus (Father, Mother)    FH: hyperlipidemia (Father)    FH: hypertension (Father, Mother)        SURGICAL HISTORY:  No significant past surgical history    No significant past surgical history    S/P craniotomy    H/O tracheostomy        REVIEW OF SYSTEMS: The patient was asked and responded to a review of systems regarding the following symptoms: constitutional, eye, ears, nose, mouth, throat, cardiovascular, respiratory. Pertinent factors have been included in the HPI.     PHYSICAL EXAMINATION:    The pertinent positive and negative examination findings were  Gen: Patient sitting comfortably in bed, not in distress   NEck: 6CN75R in place, change to a 6UN75R, tracheostomy foam placed under inferior flange, trach ties tightened   Resp: Regular respirations on trach collar, nonlabored breathing   Ears: The external ears were examined for deformities. Normal   Nose: The nose was examined for external deformities.  Normal    Oral cavity: The lips, teeth, and gums were examined for abnormalities. Normal      Vital Signs:  T(C): 36.2 (23 @ 13:00), Max: 37.4 (03-15-23 @ 22:14)  HR: 80 (23 @ 12:00) (70 - 87)  BP: 112/68 (23 @ 12:00) (97/57 - 112/68)  RR: 20 (23 @ 12:00) (18 - 22)  SpO2: 94% (23 @ 12:00) (92% - 100%)                        8.9    7.10  )-----------( 125      ( 16 Mar 2023 04:55 )             27.0    03-16    129<L>  |  92<L>  |  16  ----------------------------<  203<H>  4.1   |  28  |  0.71    Ca    8.4      16 Mar 2023 04:55  Phos  2.9     03-16  Mg     1.8     -16            PROCEDURE NOTE:     Procedure: Tracheostomy exchange (CPT 27669)      Pre-Procedure Diagnosis:  tracheostomy dependence  Post-Procedure Diagnosis: tracheostomy dependence     Indications for Procedure:  is a JORDAN CHAKRABORTY 68yMalewho presented with a history of tracheostomy last year, recently went to the OR where his trach was changed to a cuffed, now requesting for trach to be changed back to a  cuffless.  See above full HPI for further details.      Description of Procedure: After obtaining verbal consent, the patient was positioned with shoulder roll supine. Flexible suctioning was performed to clear the secretions. The existing 6CN75R tracheostomy was checked and ready with obturator. Next, the velcroe tie was removed and the tracheostomy tube was removed. The stoma was well healed. The tract appeared well formed without granulation or collapse. I did clear the skin with saline, and place an Allevyn dressing inferior to the stoma. I did replace the new 6UN75R  tracheostomy without difficulty and secured with velcroe trach tie. The patient tolerated the procedure well without complications.     Findings:  - Well formed stoma and tract  - Easy, uncomplicated placement of 6UN75R

## 2023-03-17 NOTE — PROGRESS NOTE ADULT - SUBJECTIVE AND OBJECTIVE BOX
HPI:  69 y/o right handed male with pmhx of T2DM, HLD, JAGDISH, hyponatremia, tracheal stenosis s/p tracheostomy, and glioblastoma presents for cerebral angiogram with Avastin treatment.    Daughter states in January 2022, patient had onset of confusion, dizziness, and speech difficulty while in [place] and was diagnosed with brain mass. Pt evaluated upon return to the [place] at [facility] ED with CT head revealing left frontal brain mass. Pt underwent resection of left frontal enhancing tumor with GLEOLAN placed 1/27/2022. Path showed gliosarcoma, WHO grade IV, IDH wild-type, EGFR non amplified, MGMT promotor methylated. Pt was discharged to rehab and underwent radiation treatment and Temodar chemotherapy (completed 12/22). MRI 12/22 showed recurrence of brain mass and pt presents today referred by Dr. Mackenzie for trial participation cerebral angiogram with Avastin treatment.    Pt complains of dizziness. Denies headache, nausea, vomiting, weakness, numbness, chest pain, dyspnea.   (07 Mar 2023 12:30)    OVERNIGHT EVENTS: POD10, ANA LILIA ovn, neuro/vitals stable, amiodarone d/c'd, monitoring for arrythmias/SVT while on stepdown    Hospital course:   3/7: POD0 cerebral angiogram IA avastin. post op in cath lab hypertensive, +flash pulmonary edema w/ desaturation, given diuretics. on cpap   3/8: POD1. o/n new global aphasia, right side w/d. CTH/CTA/CTP performed. ABG o/n with metabolic acidosis, lactate 7.9 and hyperglycemic 320s. started on insulin gtt, 1L NS bolus and NS@200cc/hr. desatting on cpap, placed on full vent support. propofol for sedation. in SVT, resolved with 5 lopresor. fluids stopped due to worsening CXR. repeat lactate 12, given 1amp bicarb then started on bicarb gtt. POCUS this am with hypokinetic left ventricle, pending echo. Cards/nephro/ endo consulted for further recs. Insulin gtt dc'd. VEEG monitoring. S/p 3%Na.  SQL started tonight. S/p 10 NPH after FS of 166. steven gtt. S/p 40 IV lasix. Troponin downtrending 0.32 to 0.18. EEG negative for seizures. Pancultured spiked 101.5. UA negative. Bicarb gtt dc'd. TTE EF15-20%, akinetic apex and midsegments suggestive of takotsubo  3/9: POD2. ANA LILIA o/n neuro stable. remains sedated on propofol, on levo gtt. Propofol dc'd. s/p lasix 40 mg IV x 1. CXR L effusion improved. Started lantus 10u at bedtime per endocrine. Campuzano removed, f/u TOV.    3/10: POD3 ANA LILIA overnight. Neuro exam stable. multiple bouts of diarrhea overnight and AM. Tube feeds held. s/p albumin 250 cc +  cc for tachycardia in the setting of negative fluid status. Tolerating CPAP. Tolerating trach collar x 3 hours, switched back to CPAP overnight. Off levo gtt.   3/11: POD4 Episode of O2 desaturation to low 90s while on 40% FiO2. FiO2 increased to 60% briefly with improvement in O2, good breath sounds throughout, Pt denies SOB and CXR stable. Neuro exam stable. CXR with worsening congestion, given Lasix 40mg IV, on full vent support. Heme consulted for thrombocytopenia. Fever 101F. Episode of SVT with HR to 180s and hypotensive to 80s. Started on Steven, EKG and IV tylenol. Cards fellow called to bedside. Lopressor 5mg IV given with good resolution. Vital signs normalized. Patient remained neuro intact. Started on maintenance lopressor 12.5bid and amio gtt per cards. Given another Lasix 40mg IV at 6pm. Added regular insulin 2 units q 6 per endo.  3/12: POD5 Pt remains on full vent support. Neuro exam stable. Spiked 101.7F fever, pan cultured. Started on vanco/cefepine for empiric coverage. Lasix 40mg IV given in afternoon. Started on IV iron x 3 days, per heme. Regular insulin increased to 6q6, per endo. Amio PO, increased lopressor to 25bid, per cards.  3/13: POD 6. ANA LILIA overnight, exam stable, c-diff sent for multiple loose BMs/febrile/off bowel regimen since 3/7, negative. low grade fever overnight 100.2 pan cx yesterday for fever 101.7. C. diff negative. Cefepime x 7 d for psudomonal PNA. Repeat echo done, EF 20%. Osmolite changed to Vital per endo recs for loose stools.   3/14: POD7. ANA LILIA o/n neuro stable. started on full dose SQL for DVT. 20mg lasix given for diuresis. LE dopplers showing chronic R IM calf DVT. Tolerating trach collar. Restarted his home lexapro for agitation. Increased regular insulin to 6 units. Pocus w/ several b-lines given 40 mg lasix, repeat chest xray in am. Regular insulin held for sugar 89.   3/15: POD 8 IA avastin. Overnight Increased regular insulin to 6 units. Pocus w/ several b-lines given 40 mg lasix, repeat chest xray in am. Regular insulin held for sugar 89.   CXR this mornng with pulm congestion, given lasix 40mg IV. Speech/swallow pathologist evaluated today. Plan for FEES tomorrow. Per endo, 4 regular insulin given.  3/16: POD9, overnight dc'd salt tabs, anti-xa 0.4 will recheck after next 2 doses. ENT exchanged to 6 shiley cuffless. FEES done, able to tolerate regular diet with thin liquids. anti-10a low 0.43, increased lovenox to 75BID. Tube feeds held during the day, restarted at 9PM, fingesticks borderline low, lantus 5 units given, regular insulin held.  3/17: POD10, ANA LILIA overnight,stepdown status, amiodarone d/c'ed, monitor for arrhythmias/SVT.    Vital Signs Last 24 Hrs  T(C): 37 (17 Mar 2023 00:50), Max: 37.2 (16 Mar 2023 22:01)  T(F): 98.6 (17 Mar 2023 00:50), Max: 98.9 (16 Mar 2023 22:01)  HR: 77 (16 Mar 2023 21:00) (70 - 80)  BP: 109/59 (16 Mar 2023 21:00) (108/64 - 115/63)  BP(mean): 77 (16 Mar 2023 21:00) (77 - 86)  RR: 28 (16 Mar 2023 21:00) (18 - 28)  SpO2: 98% (16 Mar 2023 21:00) (94% - 99%)    Parameters below as of 16 Mar 2023 21:00  Patient On (Oxygen Delivery Method): tracheostomy collar  O2 Flow (L/min): 10  O2 Concentration (%): 50    I&O's Summary    15 Mar 2023 07:01  -  16 Mar 2023 07:00  --------------------------------------------------------  IN: 1845 mL / OUT: 1430 mL / NET: 415 mL    16 Mar 2023 07:01  -  17 Mar 2023 01:58  --------------------------------------------------------  IN: 680 mL / OUT: 900 mL / NET: -220 mL        PHYSICAL EXAM:  General: patient seen laying supine in bed in NAD  Neuro: AAOx3 (Czech speaking), FC, OE spontaneously, speech clear, CNII-XI grossly intact, face symmetric, no pronator drift, strength 5/5 b/l UE and LE  HEENT: PERRL, EOMI  Pulmonary: chest rise symmetric  Abdomen: soft, nontender, nondistended  Ext: perfusing well  Skin: warm, dry  Wound: R groin site c/d/i       TUBES/LINES:  [] Trach collar      DIET:  [] NPO  [] Mechanical  [x] Tube feeds    LABS:                        8.9    7.10  )-----------( 125      ( 16 Mar 2023 04:55 )             27.0     03-16    129<L>  |  92<L>  |  16  ----------------------------<  203<H>  4.1   |  28  |  0.71    Ca    8.4      16 Mar 2023 04:55  Phos  2.9     03-16  Mg     1.8     03-16              CAPILLARY BLOOD GLUCOSE      POCT Blood Glucose.: 106 mg/dL (16 Mar 2023 23:01)  POCT Blood Glucose.: 194 mg/dL (16 Mar 2023 16:49)  POCT Blood Glucose.: 141 mg/dL (16 Mar 2023 12:01)  POCT Blood Glucose.: 193 mg/dL (16 Mar 2023 04:58)      Drug Levels: [] N/A    CSF Analysis: [] N/A      Allergies    amoxicillin (Rash)    Intolerances        Home Medications:  acetaminophen 325 mg oral tablet: 2 tab(s) orally every 6 hours, As needed, Mild Pain (1 - 3), Moderate Pain (4 - 6) (07 Mar 2023 12:50)  aspirin 81 mg oral tablet: 1 tab(s) orally once a day (07 Mar 2023 12:50)  levETIRAcetam 750 mg oral tablet: 1 tab(s) orally 2 times a day (07 Mar 2023 12:50)  melatonin 3 mg oral tablet: 1 tab(s) orally once a day (at bedtime) (07 Mar 2023 12:50)  polyethylene glycol 3350 oral powder for reconstitution: 17 gram(s) orally every 12 hours, As Needed for constipation (if no BM x 2 days).  (07 Mar 2023 12:50)  senna oral tablet: 2 tab(s) orally once a day (at bedtime) (07 Mar 2023 12:50)      MEDICATIONS:  MEDICATIONS  (STANDING):  atorvastatin 20 milliGRAM(s) Oral at bedtime  cefepime   IVPB 2000 milliGRAM(s) IV Intermittent every 8 hours  enoxaparin Injectable 75 milliGRAM(s) SubCutaneous every 12 hours  escitalopram 5 milliGRAM(s) Oral daily  fluticasone propionate 50 MICROgram(s)/spray Nasal Spray 1 Spray(s) Both Nostrils two times a day  insulin glargine Injectable (LANTUS) 5 Unit(s) SubCutaneous at bedtime  insulin lispro Injectable (ADMELOG) 4 Unit(s) SubCutaneous three times a day before meals  insulin regular  human corrective regimen sliding scale   SubCutaneous every 6 hours  levETIRAcetam 750 milliGRAM(s) Oral two times a day  melatonin 5 milliGRAM(s) Oral at bedtime  metoprolol tartrate 25 milliGRAM(s) Oral every 12 hours    MEDICATIONS  (PRN):  acetaminophen     Tablet .. 650 milliGRAM(s) Oral every 6 hours PRN Temp greater or equal to 38C (100.4F), Mild Pain (1 - 3)  acetylcysteine 20%  Inhalation 4 milliLiter(s) Inhalation every 6 hours PRN secretions  albuterol/ipratropium for Nebulization 3 milliLiter(s) Nebulizer every 6 hours PRN Shortness of Breath and/or Wheezing  benzocaine/menthol Lozenge 1 Lozenge Oral daily PRN Sore Throat  ondansetron Injectable 4 milliGRAM(s) IV Push every 8 hours PRN Nausea and/or Vomiting  sodium chloride 0.9% lock flush 10 milliLiter(s) IV Push every 1 hour PRN Pre/post blood products, medications, blood draw, and to maintain line patency  sodium chloride 3%  Inhalation 4 milliLiter(s) Inhalation every 6 hours PRN secretions      CULTURES:  Culture Results:   No growth at 4 days. (03-12 @ 12:22)  Culture Results:   No growth at 4 days. (03-12 @ 12:22)      RADIOLOGY & ADDITIONAL TESTS:      ASSESSMENT:  69 y/o right handed male with pmhx of T2DM, HLD, JAGDISH, hyponatremia, tracheal stenosis s/p tracheostomy, and glioblastoma now s/p cerebral angiogram with Avastin treatment (3/7/23). c/b cardiogenic shock, cardiomyopathy, improving.     Neuro   - Vitals/neuro q4   - CTA/CTP/repeat CTH negative   - Pain control   - Cont home Keppra 750 BID   - MRI w/wo on stepdown   - continue home med: Continue Lexapro 5 mg daily     Cards  - SBP    - TTE EF 15-20%, akinetic apex and midsegments suggestive of Takotsubo  - Repeat echo 3/13, EF 20% needs repeat echo on 3/20 per cardiology   - Per cards: Lopressor 25 BID,   - s/p amiodarone load for SVT, and PO BID, D/c'ed 3/17 per cards, likely 2/2 to sepsis     Pulm  - + Trach collar, tolerating passy hai valve   - Duonebs, mucomyst, saline nebs for secretions prn   - Pulm following, signed off   - trach exchanged to 6 shiley cuffless 3/16 by ENT    GI  - Consistent carb diet  - Passed FEES 3/16, d/c TFs if tolerating adequate PO diet 3/17  - NGT - Vital 1.5 @ 65 + banatrol d/t diarrhea   - Bowel regimen held for loose stool, + banatrol, last BM 3/16     RENAL:   - Chronic hyponatremia   - NaCl 2 g q 12 dc'd  - Voiding     HEME:  - DVT ppx: SQL 75 bid, SCD L side only  - Repeat anti-10a 3/17 @ 10PM (last anti 10a 0.43 on 3/16)  - LE dopplers + R IM calf DVT, 3/14 chronic R IM calf DVT  - Heme consulted for thrombocytopenia - s/p iron x 3 days    ID:   - Last panculture 3/12, Sputum psudomonas + staph aureus, +serratia marcesans   - Cefepime 2 g q 8 (3/12 - 3/18) x 7 d   - C.diff negative      ENDO:   - DKA resolved    - DM: A1C 5.8, low dose ISS   - Lantus 10 U at bedtime, Reg insulin 5 u q 6 h per endo     DISPO:   - stepdown status, full code, dispo pending     D/W Dr. Ho and Dr. Huggins    Assessment: present when checked     [] GCS   E   V   M     Heart Failure: [] Acute, [] acute on chronic, [] chronic   Heart Failure: [] Diastolic (HFpEF), [] Systolic (HRrEF), [] Combined (HFpEF and HFrEF), [] RHF, [] Pulm HTN, [] Other     [] EUGENIA, [] ATN, [] AIN, [] other   [] CKD1, [] CKD2, [] CKD3, [] CKD4, [] CKD5, [] ESRD     Encephalopathy: [] Metabolic, [] Hepatic, [] Toxic, [] Neurological, [] Other     Abnormal Nutritional Status: [] malnutrition (see nutrition note), []underweight: BMI <19, [] morbid obesity: BMI >40, [] Cachexia     [] Sepsis   [] Hypovolemic shock, [] Cardiogenic shock, [] Hemorrhagic shock, [] Neurogenic shock   [] Acute respiratory failure   [] Cerebral edema, [] Brain compression / herniation   [] Functional quadriplegia   [] Acute blood loss anemia

## 2023-03-17 NOTE — PROGRESS NOTE ADULT - ATTENDING COMMENTS
Initial attending contact date  3/8/23    . See fellow note written above for details. I reviewed the fellow documentation. I have personally seen and examined this patient. I reviewed vitals, labs, medications, cardiac studies, and additional imaging. I agree with the above fellow's findings and plans as written above with the following additions/statements.    69 y/o right handed M with PMH of T2DM, HLD, JAGDISH, hyponatremia, tracheal stenosis s/p tracheostomy, and glioblastoma s/p resection 1/2022 s/p radiation and chemo, admitted for cerebral angio with avastin tx due to recurrence of brain mass. Post op with HTN emergency leading to pulm edema  -Cardiology consulted for low EF noted on POCUS  -ECHO reviewed: severely depressed LVEF ~ 15-20% with akinetic/apical wall and basal inferior wall hyperkinesis. Mod TR with mild pulm HTN  -EKG post op NSR with new septal Qs and 1mm DAVID  -trop .32 now downtrending, BNP 6000  -Clinical picture along with EKG changes, mild trop elevation and ECHO findings all consistent with takotsubo CM  -Since takotsubo CM is diagnosis of exclusion , will need to r/o CAD if EF does not improve once clinical status improves  -Plan to repeat echo later next week. Start entresto 24/26 bid   -Clinically with sig improvement, alert awake following commands  -Events 3/12 noted with AVNRT in setting of febrile state,  cont metoprolol 25 q 12 . DC amio  - K > 4.2, Mag > 2 .

## 2023-03-17 NOTE — DISCHARGE NOTE PROVIDER - CARE PROVIDER_API CALL
Trevin Ho)  Neurosurgery  [address], [address] [place] [address]  Phone: [phone]  Fax: [phone]  Follow Up Time:     Chapin Hoang)  EndocrinologyMetabDiabetes; Internal Medicine  [address] [address]  Phone: [phone]  Fax: [phone]  Follow Up Time:     Ann Guerrero)  Cardiovascular Disease; Internal Medicine  [address], [address]  [place] [address]  Phone: [phone]  Fax: [phone]  Follow Up Time:     Lizzy Houser)  HematologyOncology; Internal Medicine  [address] [address]  Phone: [phone]  Fax: [phone]  Follow Up Time:     Markel Lopez)  Otolaryngology  [address] [place] [address]  Phone: [phone]  Fax: [phone]  Follow Up Time:    Trevin Ho)  Neurosurgery  [address], [address] [place] [address]  Phone: [phone]  Fax: [phone]  Follow Up Time:     Chapin Hoang)  EndocrinologyMetabDiabetes; Internal Medicine  [address] [address]  Phone: [phone]  Fax: [phone]  Follow Up Time:     Ann Guerrero)  Cardiovascular Disease; Internal Medicine  [address], [address]  [place] [address]  Phone: [phone]  Fax: [phone]  Follow Up Time:     Lizzy Houser)  HematologyOncology; Internal Medicine  [address] [address]  Phone: [phone]  Fax: [phone]  Follow Up Time:     Markel Lopez)  Otolaryngology  [address] [place] [address]  Phone: [phone]  Fax: [phone]  Follow Up Time:     Isabel Chiang  CARDIOLOGY  [address] [address]  Phone: [phone]  Fax: [phone]  Follow Up Time:

## 2023-03-17 NOTE — DISCHARGE NOTE PROVIDER - NSDCCPCAREPLAN_GEN_ALL_CORE_FT
PRINCIPAL DISCHARGE DIAGNOSIS  Diagnosis: Glioblastoma  Assessment and Plan of Treatment:       SECONDARY DISCHARGE DIAGNOSES  Diagnosis: Takotsubo cardiomyopathy  Assessment and Plan of Treatment:     Diagnosis: Hyponatremia  Assessment and Plan of Treatment:     Diagnosis: Cardiac arrhythmia  Assessment and Plan of Treatment:     Diagnosis: Acute respiratory failure with hypoxia  Assessment and Plan of Treatment:     Diagnosis: DVT, lower extremity  Assessment and Plan of Treatment: R IM calf DVT

## 2023-03-17 NOTE — DISCHARGE NOTE PROVIDER - NSDCFUSCHEDAPPT_GEN_ALL_CORE_FT
Hao Caba  [facility]  OTOLARYNG [address] [facility]  Scheduled Appointment: 04/18/2023    Vaughn Mitchell  [facility]  CARDIOLOGY [address]   Scheduled Appointment: 05/16/2023

## 2023-03-17 NOTE — CONSULT NOTE ADULT - CONSULT REASON
Abnormal POCUS
Diabetes management
PM&R consult
neck - trach change
Metabolic acidosis
thrombocytopenia
RF
Comanagement

## 2023-03-17 NOTE — DISCHARGE NOTE PROVIDER - CARE PROVIDERS DIRECT ADDRESSES
,[email or web address],[email or web address]

## 2023-03-17 NOTE — DISCHARGE NOTE PROVIDER - NSDCCPTREATMENT_GEN_ALL_CORE_FT
PRINCIPAL PROCEDURE  Procedure: Angiogram, carotid and cerebral arteries  Findings and Treatment: Intra arterial mannitol and avastin injection for GBM

## 2023-03-17 NOTE — CONSULT NOTE ADULT - SUBJECTIVE AND OBJECTIVE BOX
S: patient seen bedside. At this time he states he is doing well, he denies any acute complaints, feels well and wants to go home. Denies fevers, chills, weakness, SOB, ROSA, CP, palpitations, NVD, dysuria etc.       HPI:  67 y/o right handed male with pmhx of T2DM, HLD, JAGDISH, hyponatremia, tracheal stenosis s/p tracheostomy, and glioblastoma presents for cerebral angiogram with Avastin treatment.    Daughter states in January 2022, patient had onset of confusion, dizziness, and speech difficulty while in [place] and was diagnosed with brain mass. Pt evaluated upon return to the [place] at [facility] ED with CT head revealing left frontal brain mass. Pt underwent resection of left frontal enhancing tumor with GLEOLAN placed 1/27/2022. Path showed gliosarcoma, WHO grade IV, IDH wild-type, EGFR non amplified, MGMT promotor methylated. Pt was discharged to rehab and underwent radiation treatment and Temodar chemotherapy (completed 12/22). MRI 12/22 showed recurrence of brain mass and pt presents today referred by Dr. Mackenzie for trial participation cerebral angiogram with Avastin treatment.    Pt complains of dizziness. Denies headache, nausea, vomiting, weakness, numbness, chest pain, dyspnea.   (07 Mar 2023 12:30)      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      Type 2 diabetes mellitus      Hyperlipidemia      Iron deficiency anemia      Nephrolithiasis      Thrombocytopenia      Hyponatremia      BPH (benign prostatic hyperplasia)      S/P craniotomy      H/O tracheostomy        Home Medications:  acetaminophen 325 mg oral tablet: 2 tab(s) orally every 6 hours, As needed, Mild Pain (1 - 3), Moderate Pain (4 - 6) (07 Mar 2023 12:50)  aspirin 81 mg oral tablet: 1 tab(s) orally once a day (07 Mar 2023 12:50)  levETIRAcetam 750 mg oral tablet: 1 tab(s) orally 2 times a day (07 Mar 2023 12:50)  melatonin 3 mg oral tablet: 1 tab(s) orally once a day (at bedtime) (07 Mar 2023 12:50)  polyethylene glycol 3350 oral powder for reconstitution: 17 gram(s) orally every 12 hours, As Needed for constipation (if no BM x 2 days).  (07 Mar 2023 12:50)  senna oral tablet: 2 tab(s) orally once a day (at bedtime) (07 Mar 2023 12:50)    Allergies    amoxicillin (Rash)    Intolerances      FAMILY HISTORY:  FH: diabetes mellitus (Father, Mother)    FH: hyperlipidemia (Father)    FH: hypertension (Father, Mother)      Social History:      REVIEW OF SYSTEMS:  CONSTITUTIONAL: No fever, weight loss  EYES: No eye pain, or discharge  ENMT:  No tinnitus, vertigo  NECK: No pain or stiffness  RESPIRATORY: No cough, No dyspnea  CARDIOVASCULAR: No chest pain, or leg swelling  GASTROINTESTINAL: No abdominal pain. No diarrhea ;No melena or hematochezia.  GENITOURINARY: No dysuria, frequency, or hematuria  NEUROLOGICAL: No numbness, or tremors  SKIN: No itching, burning, rashes, or lesions   ENDOCRINE: No heat or cold intolerance;  MUSCULOSKELETAL: No joint pain or swelling;   PSYCHIATRIC: No mood swings, or difficulty sleeping  HEME/LYMPH: No easy bruising, or bleeding gums  ALLERGY AND IMMUNOLOGIC: No hives or eczema    Diet, Consistent Carbohydrate w/Evening Snack (03-17-23 @ 06:48) [Active]      CURRENT MEDICATIONS:   acetaminophen     Tablet .. 650 milliGRAM(s) Oral every 6 hours PRN  acetylcysteine 20%  Inhalation 4 milliLiter(s) Inhalation every 6 hours PRN  albuterol/ipratropium for Nebulization 3 milliLiter(s) Nebulizer every 6 hours PRN  atorvastatin 20 milliGRAM(s) Oral at bedtime  benzocaine/menthol Lozenge 1 Lozenge Oral daily PRN  cefepime   IVPB 2000 milliGRAM(s) IV Intermittent every 8 hours  enoxaparin Injectable 75 milliGRAM(s) SubCutaneous every 12 hours  escitalopram 5 milliGRAM(s) Oral daily  fluticasone propionate 50 MICROgram(s)/spray Nasal Spray 1 Spray(s) Both Nostrils two times a day  insulin glargine Injectable (LANTUS) 10 Unit(s) SubCutaneous at bedtime  insulin lispro Injectable (ADMELOG) 4 Unit(s) SubCutaneous three times a day before meals  insulin regular  human corrective regimen sliding scale   SubCutaneous every 6 hours  levETIRAcetam 750 milliGRAM(s) Oral two times a day  melatonin 5 milliGRAM(s) Oral at bedtime  metoprolol tartrate 25 milliGRAM(s) Oral every 12 hours  ondansetron Injectable 4 milliGRAM(s) IV Push every 8 hours PRN  sodium chloride 0.9% lock flush 10 milliLiter(s) IV Push every 1 hour PRN  sodium chloride 3%  Inhalation 4 milliLiter(s) Inhalation every 6 hours PRN      VITAL SIGNS, INS/OUTS (last 24 hours):  Vital Signs Last 24 Hrs  T(C): 36.5 (17 Mar 2023 09:03), Max: 37.2 (16 Mar 2023 22:01)  T(F): 97.7 (17 Mar 2023 09:03), Max: 98.9 (16 Mar 2023 22:01)  HR: 72 (17 Mar 2023 09:18) (72 - 80)  BP: 111/63 (17 Mar 2023 09:18) (104/62 - 115/63)  BP(mean): 82 (17 Mar 2023 09:18) (75 - 86)  RR: 17 (17 Mar 2023 09:04) (17 - 28)  SpO2: 94% (17 Mar 2023 09:18) (93% - 99%)    Parameters below as of 17 Mar 2023 09:18  Patient On (Oxygen Delivery Method): tracheostomy collar  O2 Flow (L/min): 10  O2 Concentration (%): 40  I&O's Summary    16 Mar 2023 07:01  -  17 Mar 2023 07:00  --------------------------------------------------------  IN: 1560 mL / OUT: 1250 mL / NET: 310 mL    17 Mar 2023 07:01  -  17 Mar 2023 11:50  --------------------------------------------------------  IN: 180 mL / OUT: 0 mL / NET: 180 mL        PHYSICAL EXAM:  Gen: pleasant male sitting upright in chair eating breakfast, looks comfortable  HEENT: trach collar in place, MM moist  Neck: supple, no obvious JVD  CV: RRR, +S1/S2  Pulm: CTA b/l non labored  Abd: soft, NTND, +BS  Ext: no LE edema  Neuro: AOx3, no obvious deficits  Psych: affect and behavior appropriate     BASIC LABS:                        8.7    7.40  )-----------( 127      ( 17 Mar 2023 09:03 )             26.6     03-17    129<L>  |  92<L>  |  12  ----------------------------<  126<H>  3.8   |  29  |  0.72    Ca    8.3<L>      17 Mar 2023 09:03  Phos  2.7     03-17  Mg     1.7     03-17    TPro  5.7<L>  /  Alb  2.8<L>  /  TBili  0.6  /  DBili  <0.2  /  AST  44<H>  /  ALT  69<H>  /  AlkPhos  85  03-17        CAPILLARY BLOOD GLUCOSE      POCT Blood Glucose.: 97 mg/dL (17 Mar 2023 11:42)  POCT Blood Glucose.: 129 mg/dL (17 Mar 2023 08:56)  POCT Blood Glucose.: 175 mg/dL (17 Mar 2023 07:45)  POCT Blood Glucose.: 170 mg/dL (17 Mar 2023 05:56)  POCT Blood Glucose.: 187 mg/dL (17 Mar 2023 01:58)  POCT Blood Glucose.: 106 mg/dL (16 Mar 2023 23:01)  POCT Blood Glucose.: 194 mg/dL (16 Mar 2023 16:49)  POCT Blood Glucose.: 141 mg/dL (16 Mar 2023 12:01)      OTHER LABS:        MICRODATA:      IMAGING:    EKG:    #Diet - Diet, Consistent Carbohydrate w/Evening Snack (03-17-23 @ 06:48) [Active]        #DVT PPx - enoxaparin Injectable: [Ordered as LOVENOX]  75 milliGRAM(s), SubCutaneous, every 12 hours  Administration Instructions: This is a High Alert Medication. (03-16-23 @ 22:06)

## 2023-03-17 NOTE — DISCHARGE NOTE PROVIDER - PROVIDER TOKENS
PROVIDER:[TOKEN:[[id number]]],PROVIDER:[TOKEN:[[id number]]],PROVIDER:[TOKEN:[[id number]]],PROVIDER:[TOKEN:[[id number]]],PROVIDER:[TOKEN:[[id number]]] PROVIDER:[TOKEN:[[id number]]],PROVIDER:[TOKEN:[[id number]]],PROVIDER:[TOKEN:[[id number]]],PROVIDER:[TOKEN:[[id number]]],PROVIDER:[TOKEN:[[id number]]],PROVIDER:[TOKEN:[[id number]]]

## 2023-03-17 NOTE — DISCHARGE NOTE PROVIDER - NSDCFUADDINST_GEN_ALL_CORE_FT
Neurosurgery follow up appointment date/time:  - please call the office to confirm appointment: [phone]    Wound Care:  - steri strips at groin site will fall off on their own   - you may shower tomorrow  - if you received a "closure device" in your groin, no bathing or swimming for 5 days.    Activity:  - Limit your physical activities for 24 hours.  - Do not engage in sports, heavy work or heavy lifting for 72 hours.  - walking is recommended, ambulate as tolerated  - you may shower tomorrow, keep your groin site dry   - if you received a "closure device" in your groin, no bathing or swimming for 5 days.  - no driving within 24 hours of anesthesia or while taking prescription pain medications   - keep hydrated, drink plenty of water     Diet:  - You may resume your regular diet.  - Drinking extra fluids (water, juice) is encouraged.  - Abstain from alcohol for 24 hours.    Inpatient consults:  - Cardiology  - Heme/Onc  - Endo  - ENT    Please also follow up with your primary care doctor.     Pain Expectations:  - A mild pain at the puncture site is not unusual.  - You may take Tylenol 1-2 tabs every 4-6 hours as needed   - If the pain persists after 24 hours contact the office at [phone]    Medications:  - changes to home meds (ex. AED's)?  - new meds?  - pain meds?  - when can antiplatelets or anticoagulants be restarted?  - were adverse affects of meds discussed with patients?   - pain medications can cause constipation, you should eat a high fiber diet and may take a stool softener while on pain meds     SPECIAL INSTRUCTIONS:  Signs and symptoms to look out for:    1. Bryson bleeding from the puncture site is an emergency. Put direct pressure on the site and go directly to your local Emergency Room for treatment.    2. Bleeding under the skin may also occur and a small "black and blue" may be expected. If there appears to be an expanding mass or swelling around the puncture site, apply manual compression and go immediately to your local Emergency Room for treatment.    3. If your foot/leg or hand/arm (puncture site) becomes cool or blue and/or you are unable to move it, this must be treated as an emergency. Go directly to your local Emergency Room for treatment.    4. Excessive puncture site pain is abnormal and should be assessed.    5.  Look out for signs of infection in the puncture site: fever, red streaking of the leg, discharge, pain.    6.  Lack of adequate urine output, provided you are drinking enough fluids, may be cause for concern, notify us if you think this is the case.    Playback:  - See playback health for a copy of your discharge paperwork     WITHIN 24 HOURS OF DISCHARGE, PLEASE CONTACT NEURO PA  WITH ANY QUESTIONS OR CONCERNS: [phone]   OTHERWISE, PLEASE CALL THE OFFICE WITH ANY QUESTIONS OR CONCERNS: [phone] Neurosurgery follow up appointment date/time:  - please call the office to confirm appointment: [phone]    Wound Care:  - steri strips at groin site will fall off on their own   - you may shower tomorrow  - if you received a "closure device" in your groin, no bathing or swimming for 5 days.    Activity:  - Limit your physical activities for 24 hours.  - Do not engage in sports, heavy work or heavy lifting for 72 hours.  - walking is recommended, ambulate as tolerated  - you may shower tomorrow, keep your groin site dry   - if you received a "closure device" in your groin, no bathing or swimming for 5 days.  - no driving within 24 hours of anesthesia or while taking prescription pain medications   - keep hydrated, drink plenty of water     Diet:  - You may resume your regular diet.  - Drinking extra fluids (water, juice) is encouraged.  - Abstain from alcohol for 24 hours  Please also follow up with your primary care doctor.    Pain Expectations:  - A mild pain at the puncture site is not unusual.  - You may take Tylenol 1-2 tabs every 4-6 hours as needed   - If the pain persists after 24 hours contact the office at [phone]    Medications:  - Continue with all your medications that you were taking in the hospital.   - pain medications can cause constipation, you should eat a high fiber diet and may take a stool softener while on pain meds     SPECIAL INSTRUCTIONS:  Signs and symptoms to look out for:    1. Bryson bleeding from the puncture site is an emergency. Put direct pressure on the site and go directly to your local Emergency Room for treatment.    2. Bleeding under the skin may also occur and a small "black and blue" may be expected. If there appears to be an expanding mass or swelling around the puncture site, apply manual compression and go immediately to your local Emergency Room for treatment.    3. If your foot/leg or hand/arm (puncture site) becomes cool or blue and/or you are unable to move it, this must be treated as an emergency. Go directly to your local Emergency Room for treatment.    4. Excessive puncture site pain is abnormal and should be assessed.    5.  Look out for signs of infection in the puncture site: fever, red streaking of the leg, discharge, pain.    6.  Lack of adequate urine output, provided you are drinking enough fluids, may be cause for concern, notify us if you think this is the case.    Playback:  - See playback health for a copy of your discharge paperwork     WITHIN 24 HOURS OF DISCHARGE, PLEASE CONTACT NEURO PA  WITH ANY QUESTIONS OR CONCERNS: [phone]   OTHERWISE, PLEASE CALL THE OFFICE WITH ANY QUESTIONS OR CONCERNS: [phone]

## 2023-03-17 NOTE — CONSULT NOTE ADULT - CONSULT REQUESTED DATE/TIME
16-Mar-2023 15:14
11-Mar-2023 14:35
08-Mar-2023 13:06
17-Mar-2023 05:35
08-Mar-2023 15:14
08-Mar-2023 04:00
08-Mar-2023 14:11
17-Mar-2023 11:50

## 2023-03-17 NOTE — PROGRESS NOTE ADULT - ATTENDING COMMENTS
Pt seen on rounds this afternoon.  Looked well, was in excellent spirits.  Strength in his RUE seemed nearly back to normal.    Was started back on diet last night.  Did not receive the 4 units of lispro before supper (though he ate only soup)--instead got 6 units of regular at 6 PM to handle the feeds.  The feeds were off, however, and were not restarted until 9 PM.  Fingerstick at MN was down to 106, and he received only the 5 Lantus, not the nutritional regular (even though the feeds were running).  This morning was up to 170 mg%.  Received 4 units lispro before breakfast, fell to 97 at noon, but ate almost no carb at breakfast.  --Will continue the Lantus at 5 units hs for now  --Continue 4 units lispro premeal, but we have cautioned him to include more carbs at his meal.

## 2023-03-17 NOTE — CHART NOTE - NSCHARTNOTEFT_GEN_A_CORE
3/17: POD10, ANA LILIA overnight, amiodarone d/c'ed, monitor for arrhythmias/SVT. LFTs elevated this am, trend (recheck in 2 days). Pend MRI brain, repeat echo on 3/20. Pend anti-Xa this evening at 10pm. Pend possible discharge to rehab Mon 3/19.

## 2023-03-17 NOTE — CONSULT NOTE ADULT - PROBLEM SELECTOR RECOMMENDATION 3
fluid restriction and diuresis if hemodynamically stable
Likely multifactorial but hypovolemic main . Around normal which seems to be 130s. No symptoms. Monitor.

## 2023-03-17 NOTE — CHART NOTE - NSCHARTNOTEFT_GEN_A_CORE
Please note patient can tolerate 3 hours of physical therapy in an acute rehabilitation environment.

## 2023-03-17 NOTE — DISCHARGE NOTE PROVIDER - NSDCFUADDAPPT_GEN_ALL_CORE_FT
Please follow up with Dr. Ho, call the office to make/confirm appointment at [phone]    Please follow up with Cardiology outpatient for management of Cardiomyopathy     Please follow up with Hematology/Oncology outpatient for management of GBM and DVT     Please follow up with ENT outpatient for management of Tracheostomy     Please follow up at discharge with [facility] by calling [phone] to make an appointment.      Please follow up with your primary care doctor  Please follow up with Dr. Ho, call the office to make/confirm appointment at [phone]    Please follow up with Cardiology outpatient for management of Cardiomyopathy     Please follow up with Hematology/Oncology outpatient for management of GBM and DVT     Please follow up with ENT outpatient for management of Tracheostomy     Please follow up at discharge with [facility] by calling [phone] to make an appointment.      Please follow up with Cardiology, Dr. Chiang, in 1-2 weeks for management of heart failure.     Please follow up with your primary care doctor

## 2023-03-17 NOTE — DISCHARGE NOTE PROVIDER - NSDCMRMEDTOKEN_GEN_ALL_CORE_FT
acetaminophen 325 mg oral tablet: 2 tab(s) orally every 6 hours, As needed, Mild Pain (1 - 3), Moderate Pain (4 - 6)  aspirin 81 mg oral tablet: 1 tab(s) orally once a day  atorvastatin 20 mg oral tablet: 1 tab(s) orally once a day (at bedtime)  escitalopram 5 mg oral tablet: 1 tab(s) orally once a day  fluticasone 50 mcg/inh nasal spray: 1 spray(s) nasal 2 times a day  gabapentin 100 mg oral capsule: 1 cap(s) orally once a day (at bedtime)  HumaLOG KwikPen 100 units/mL injectable solution: 5 unit(s) subcutaneous 3 times a day (with meals)   Insulin Pen Needles, 4mm: 1 application subcutaneously 4 times a day. ** Use with insulin pen **   Lantus Solostar Pen 100 units/mL subcutaneous solution: 16 unit(s) subcutaneous once a day (at bedtime)   levETIRAcetam 750 mg oral tablet: 1 tab(s) orally 2 times a day  melatonin 3 mg oral tablet: 1 tab(s) orally once a day (at bedtime)  metFORMIN 1000 mg oral tablet: 1 tab(s) orally 2 times a day  pantoprazole 40 mg oral delayed release tablet: 1 tab(s) orally once a day (before a meal)  polyethylene glycol 3350 oral powder for reconstitution: 17 gram(s) orally every 12 hours, As Needed for constipation (if no BM x 2 days).   senna oral tablet: 2 tab(s) orally once a day (at bedtime)  sodium chloride 1 g oral tablet: 1 tab(s) orally 2 times a day    acetaminophen 325 mg oral tablet: 2 tab(s) orally every 6 hours, As needed, Temp greater or equal to 38C (100.4F), Mild Pain (1 - 3)  acetaminophen 325 mg oral tablet: 2 tab(s) orally every 6 hours, As needed, Mild Pain (1 - 3), Moderate Pain (4 - 6)  acetylcysteine 20% inhalation solution: 4 milliliter(s) inhaled every 6 hours, As needed, secretions  aspirin 81 mg oral tablet: 1 tab(s) orally once a day  atorvastatin 20 mg oral tablet: 1 tab(s) orally once a day (at bedtime)  atorvastatin 20 mg oral tablet: 1 tab(s) orally once a day (at bedtime)  enoxaparin: 75 milligram(s) subcutaneous 2 times a day  escitalopram 5 mg oral tablet: 1 tab(s) orally once a day  escitalopram 5 mg oral tablet: 1 tab(s) orally once a day  fluticasone 50 mcg/inh nasal spray: 1 spray(s) nasal 2 times a day  fluticasone 50 mcg/inh nasal spray: 1 spray(s) nasal 2 times a day  gabapentin 100 mg oral capsule: 1 cap(s) orally once a day (at bedtime)  HumaLOG KwikPen 100 units/mL injectable solution: 5 unit(s) subcutaneous 3 times a day (with meals)   Insulin Pen Needles, 4mm: 1 application subcutaneously 4 times a day. ** Use with insulin pen **   ipratropium-albuterol 0.5 mg-2.5 mg/3 mL inhalation solution: 3 milliliter(s) inhaled every 6 hours, As needed, Shortness of Breath and/or Wheezing  Meghan Solostar Pen 100 units/mL subcutaneous solution: 16 unit(s) subcutaneous once a day (at bedtime)   levETIRAcetam 750 mg oral tablet: 1 tab(s) orally 2 times a day  levETIRAcetam 750 mg oral tablet: 1 tab(s) orally 2 times a day  melatonin 3 mg oral tablet: 1 tab(s) orally once a day (at bedtime)  melatonin 5 mg oral tablet: 1 tab(s) orally once a day (at bedtime)  menthol-benzocaine topical: 1 lozenge orally once a day  metFORMIN 1000 mg oral tablet: 1 tab(s) orally 2 times a day  metoprolol succinate 50 mg oral tablet, extended release: 1 tab(s) orally once a day  ondansetron 2 mg/mL injectable solution: 4 milligram(s) injectable every 8 hours, As Needed  pantoprazole 40 mg oral delayed release tablet: 1 tab(s) orally once a day (before a meal)  polyethylene glycol 3350 oral powder for reconstitution: 17 gram(s) orally every 12 hours, As Needed for constipation (if no BM x 2 days).   sacubitril-valsartan 24 mg-26 mg oral tablet: 1 tab(s) orally every 12 hours  senna oral tablet: 2 tab(s) orally once a day (at bedtime)  sodium chloride 1 g oral tablet: 1 tab(s) orally 2 times a day   sodium chloride 1 g oral tablet: 2 tab(s) orally every 6 hours  sodium chloride 3% inhalation solution: 4 milliliter(s) inhaled every 6 hours, As needed, secretions   acetaminophen 325 mg oral tablet: 2 tab(s) orally every 6 hours, As needed, Temp greater or equal to 38C (100.4F), Mild Pain (1 - 3)  acetylcysteine 20% inhalation solution: 4 milliliter(s) inhaled every 6 hours, As needed, secretions  atorvastatin 20 mg oral tablet: 1 tab(s) orally once a day (at bedtime)  enoxaparin: 75 milligram(s) subcutaneous 2 times a day  escitalopram 5 mg oral tablet: 1 tab(s) orally once a day  fluticasone 50 mcg/inh nasal spray: 1 spray(s) nasal 2 times a day  ipratropium-albuterol 0.5 mg-2.5 mg/3 mL inhalation solution: 3 milliliter(s) inhaled every 6 hours, As needed, Shortness of Breath and/or Wheezing  Januvia 100 mg oral tablet: 1 tab(s) orally once a day   levETIRAcetam 750 mg oral tablet: 1 tab(s) orally 2 times a day  melatonin 5 mg oral tablet: 1 tab(s) orally once a day (at bedtime)  menthol-benzocaine topical: 1 lozenge orally once a day  metoprolol succinate 50 mg oral tablet, extended release: 1 tab(s) orally once a day  ondansetron 2 mg/mL injectable solution: 4 milligram(s) injectable every 8 hours, As Needed  sacubitril-valsartan 24 mg-26 mg oral tablet: 1 tab(s) orally every 12 hours  sodium chloride 1 g oral tablet: 2 tab(s) orally every 6 hours  sodium chloride 3% inhalation solution: 4 milliliter(s) inhaled every 6 hours, As needed, secretions

## 2023-03-17 NOTE — DISCHARGE NOTE PROVIDER - NPI NUMBER (FOR SYSADMIN USE ONLY) :
[[id number] [[id number]]

## 2023-03-17 NOTE — PROGRESS NOTE ADULT - SUBJECTIVE AND OBJECTIVE BOX
SUBJECTIVE / INTERVAL HPI: Patient was seen and examined this morning. The patient’s diet was advanced to regular consistent carbohydrate for dinner last night. Per flowsheet, there was an interruption in tube feeds between 3PM and 9PM. Then, tube feeds were resumed with Vital 1.5 ji at 50 mL/hr from yesterday at 5PM until today at 7AM.    CAPILLARY BLOOD GLUCOSE & INSULIN RECEIVED  105 mg/dL (03-15 @ 23:02) ? 10 units of regular insulin.   203 mg/dL (03-16 @ 04:55) ? 6 units of regular insulin + 1 unit of sliding scale.   141 mg/dL (03-16 @ 12:01) ?  Ø  194 mg/dL (03-16 @ 16:49) ? 6 units of regular insulin + 1 unit of sliding scale.   106 mg/dL (03-16 @ 23:01) ? 5 units of lantus.   187 mg/dL (03-17 @ 01:58) ? Ø  170 mg/dL (03-17 @ 05:56) ? Ø  175 mg/dL (03-17 @ 07:45) ? 1 unit of lispro sliding scale.   129 mg/dL (03-17 @ 08:56) ? 4 units of lispro.   97 mg/dL (03-17 @ 11:42) ? 4 units of lispro.     REVIEW OF SYSTEMS  Constitutional:  Negative fever or chills.  Cardiovascular:  Negative for chest pain or palpitations.  Respiratory:  Negative for cough or shortness of breath.    Gastrointestinal:  Negative for nausea, vomiting, diarrhea, constipation, or abdominal pain.  Neurologic:  No headache, confusion, dizziness, lightheadedness.    PHYSICAL EXAM  Vital Signs Last 24 Hrs  T(C): 36.8 (17 Mar 2023 14:17), Max: 37.2 (16 Mar 2023 22:01)  T(F): 98.3 (17 Mar 2023 14:17), Max: 98.9 (16 Mar 2023 22:01)  HR: 70 (17 Mar 2023 12:33) (70 - 80)  BP: 103/57 (17 Mar 2023 12:33) (103/57 - 115/63)  BP(mean): 75 (17 Mar 2023 12:33) (75 - 86)  RR: 17 (17 Mar 2023 12:33) (17 - 28)  SpO2: 93% (17 Mar 2023 12:33) (93% - 99%)    Parameters below as of 17 Mar 2023 12:33  Patient On (Oxygen Delivery Method): tracheostomy collar    Constitutional: Awake, alert, male, in no distress.   Neck: (+) Tracheostomy.   Respiratory: clear to auscultation bilaterally.   Cardiovascular: regular rhythm, normal S1 and S2, no audible murmurs.   GI: soft, non-distended, bowel sounds increased.   Extremities: No lower extremity edema.    LABS  CBC - WBC/HGB/HTC/PLT: 7.40/8.7/26.6/127 (03-17-23)  BMP - Na/K/Cl/Bicarb/BUN/Cr/Gluc/AG/eGFR: 129/3.8/92/29/12/0.72/126/8/100 (03-17-23)  Ca - 8.3 (03-17-23)  Phos - 2.7 (03-17-23)  Mg - 1.7 (03-17-23)  LFT - Alb/Tprot/Tbili/Dbili/AlkPhos/ALT/AST: 2.8/--/0.6/<0.2/85/69/44 (03-17-23)  PT/aPTT/INR: 18.1/28.7/1.51 (03-12-23)   Thyroid Stimulating Hormone, Serum: 1.210 (03-08-23)    MEDICATIONS  MEDICATIONS  (STANDING):  atorvastatin 20 milliGRAM(s) Oral at bedtime  cefepime   IVPB 2000 milliGRAM(s) IV Intermittent every 8 hours  enoxaparin Injectable 75 milliGRAM(s) SubCutaneous every 12 hours  escitalopram 5 milliGRAM(s) Oral daily  fluticasone propionate 50 MICROgram(s)/spray Nasal Spray 1 Spray(s) Both Nostrils two times a day  insulin glargine Injectable (LANTUS) 10 Unit(s) SubCutaneous at bedtime  insulin lispro Injectable (ADMELOG) 4 Unit(s) SubCutaneous three times a day before meals  insulin regular  human corrective regimen sliding scale   SubCutaneous every 6 hours  levETIRAcetam 750 milliGRAM(s) Oral two times a day  melatonin 5 milliGRAM(s) Oral at bedtime  metoprolol tartrate 25 milliGRAM(s) Oral every 12 hours    MEDICATIONS  (PRN):  acetaminophen     Tablet .. 650 milliGRAM(s) Oral every 6 hours PRN Temp greater or equal to 38C (100.4F), Mild Pain (1 - 3)  acetylcysteine 20%  Inhalation 4 milliLiter(s) Inhalation every 6 hours PRN secretions  albuterol/ipratropium for Nebulization 3 milliLiter(s) Nebulizer every 6 hours PRN Shortness of Breath and/or Wheezing  benzocaine/menthol Lozenge 1 Lozenge Oral daily PRN Sore Throat  ondansetron Injectable 4 milliGRAM(s) IV Push every 8 hours PRN Nausea and/or Vomiting  sodium chloride 0.9% lock flush 10 milliLiter(s) IV Push every 1 hour PRN Pre/post blood products, medications, blood draw, and to maintain line patency  sodium chloride 3%  Inhalation 4 milliLiter(s) Inhalation every 6 hours PRN secretions    ASSESSMENT / RECOMMENDATIONS  Mr. Norwood is a 68-year-old male with a past medical history of type 2 diabetes mellitus, hyperlipidemia, iron deficiency anemia, tracheal stenosis (s/p tracheostomy) and glioblastoma (found to have a mass in 1/2022, s/p resection of left frontal enhancing tumor with GLEOLAN placed on 1/27/22 with pathology showing gliosarcoma, WHO grade IV s/p radiation and chemotherapy completed on 12/2022, repeat MRI showing recurrence of brain mass on 12/2022) who presented for further management, now s/p cerebral angiogram with Avastin treatment (3/7/23). Post-operative course was complicated by flash pulmonary edema, desaturation, new global aphasia, right sided weakness, lactic acidosis and hyperglycemia. Endocrinology following for recommendations regarding glycemic control.     A1C: 5.8 %  BUN: 12  Creatinine: 0.72  GFR: 100  Weight: 71.214  BMI: 24.6    # Type 2 diabetes mellitus  - Please continue lantus *** units at bedtime.   - Continue lispro *** units before each meal.  - Continue lispro moderate / low dose sliding scale before meals and at bedtime.  - Patient's fingerstick glucose goal is 100-180 mg/dL.    - For discharge, patient can ***.    - Patient can follow up at discharge with [facility] by calling [phone] to make an appointment.      Case discussed with Dr. Hoang. Primary team updated.       Fabrice Madrid    Endocrinology Fellow    Service Pager: [phone]    SUBJECTIVE / INTERVAL HPI: Patient was seen and examined this morning. The patient’s diet was advanced to regular consistent carbohydrate for dinner last night. Per flowsheet, there was an interruption in tube feeds between 3PM and 9PM. Then, tube feeds were resumed with Vital 1.5 ji at 50 mL/hr from yesterday at 5PM until today at 7AM. He's pending possible discharge to rehabilitation in 3/19/23.    CAPILLARY BLOOD GLUCOSE & INSULIN RECEIVED  105 mg/dL (03-15 @ 23:02) ? 10 units of regular insulin.   203 mg/dL (03-16 @ 04:55) ? 6 units of regular insulin + 1 unit of sliding scale.   141 mg/dL (03-16 @ 12:01) ?  Ø  194 mg/dL (03-16 @ 16:49) ? 6 units of regular insulin + 1 unit of sliding scale.   106 mg/dL (03-16 @ 23:01) ? 5 units of lantus.   187 mg/dL (03-17 @ 01:58) ? Ø  170 mg/dL (03-17 @ 05:56) ? Ø  175 mg/dL (03-17 @ 07:45) ? 1 unit of lispro sliding scale.   129 mg/dL (03-17 @ 08:56) ? 4 units of lispro.   97 mg/dL (03-17 @ 11:42) ? 4 units of lispro.     REVIEW OF SYSTEMS  Constitutional:  Negative fever or chills.  Cardiovascular:  Negative for chest pain or palpitations.  Respiratory:  Negative for cough or shortness of breath.    Gastrointestinal:  Negative for nausea, vomiting, diarrhea, constipation, or abdominal pain.  Neurologic:  No headache, confusion, dizziness, lightheadedness.    PHYSICAL EXAM  Vital Signs Last 24 Hrs  T(C): 36.8 (17 Mar 2023 14:17), Max: 37.2 (16 Mar 2023 22:01)  T(F): 98.3 (17 Mar 2023 14:17), Max: 98.9 (16 Mar 2023 22:01)  HR: 70 (17 Mar 2023 12:33) (70 - 80)  BP: 103/57 (17 Mar 2023 12:33) (103/57 - 115/63)  BP(mean): 75 (17 Mar 2023 12:33) (75 - 86)  RR: 17 (17 Mar 2023 12:33) (17 - 28)  SpO2: 93% (17 Mar 2023 12:33) (93% - 99%)    Parameters below as of 17 Mar 2023 12:33  Patient On (Oxygen Delivery Method): tracheostomy collar    Constitutional: Awake, alert, male, in no distress.   Neck: (+) Tracheostomy.   Respiratory: clear to auscultation bilaterally.   Cardiovascular: regular rhythm, normal S1 and S2, no audible murmurs.   GI: soft, non-distended, bowel sounds increased.   Extremities: No lower extremity edema.    LABS  CBC - WBC/HGB/HTC/PLT: 7.40/8.7/26.6/127 (03-17-23)  BMP - Na/K/Cl/Bicarb/BUN/Cr/Gluc/AG/eGFR: 129/3.8/92/29/12/0.72/126/8/100 (03-17-23)  Ca - 8.3 (03-17-23)  Phos - 2.7 (03-17-23)  Mg - 1.7 (03-17-23)  LFT - Alb/Tprot/Tbili/Dbili/AlkPhos/ALT/AST: 2.8/--/0.6/<0.2/85/69/44 (03-17-23)  PT/aPTT/INR: 18.1/28.7/1.51 (03-12-23)   Thyroid Stimulating Hormone, Serum: 1.210 (03-08-23)    MEDICATIONS  MEDICATIONS  (STANDING):  atorvastatin 20 milliGRAM(s) Oral at bedtime  cefepime   IVPB 2000 milliGRAM(s) IV Intermittent every 8 hours  enoxaparin Injectable 75 milliGRAM(s) SubCutaneous every 12 hours  escitalopram 5 milliGRAM(s) Oral daily  fluticasone propionate 50 MICROgram(s)/spray Nasal Spray 1 Spray(s) Both Nostrils two times a day  insulin glargine Injectable (LANTUS) 10 Unit(s) SubCutaneous at bedtime  insulin lispro Injectable (ADMELOG) 4 Unit(s) SubCutaneous three times a day before meals  insulin regular  human corrective regimen sliding scale   SubCutaneous every 6 hours  levETIRAcetam 750 milliGRAM(s) Oral two times a day  melatonin 5 milliGRAM(s) Oral at bedtime  metoprolol tartrate 25 milliGRAM(s) Oral every 12 hours    MEDICATIONS  (PRN):  acetaminophen     Tablet .. 650 milliGRAM(s) Oral every 6 hours PRN Temp greater or equal to 38C (100.4F), Mild Pain (1 - 3)  acetylcysteine 20%  Inhalation 4 milliLiter(s) Inhalation every 6 hours PRN secretions  albuterol/ipratropium for Nebulization 3 milliLiter(s) Nebulizer every 6 hours PRN Shortness of Breath and/or Wheezing  benzocaine/menthol Lozenge 1 Lozenge Oral daily PRN Sore Throat  ondansetron Injectable 4 milliGRAM(s) IV Push every 8 hours PRN Nausea and/or Vomiting  sodium chloride 0.9% lock flush 10 milliLiter(s) IV Push every 1 hour PRN Pre/post blood products, medications, blood draw, and to maintain line patency  sodium chloride 3%  Inhalation 4 milliLiter(s) Inhalation every 6 hours PRN secretions    ASSESSMENT / RECOMMENDATIONS  Mr. Norwood is a 68-year-old male with a past medical history of type 2 diabetes mellitus, hyperlipidemia, iron deficiency anemia, tracheal stenosis (s/p tracheostomy) and glioblastoma (found to have a mass in 1/2022, s/p resection of left frontal enhancing tumor with GLEOLAN placed on 1/27/22 with pathology showing gliosarcoma, WHO grade IV s/p radiation and chemotherapy completed on 12/2022, repeat MRI showing recurrence of brain mass on 12/2022) who presented for further management, now s/p cerebral angiogram with Avastin treatment (3/7/23). Post-operative course was complicated by flash pulmonary edema, desaturation, new global aphasia, right sided weakness, lactic acidosis and hyperglycemia. Endocrinology following for recommendations regarding glycemic control.     A1C: 5.8 %  BUN: 12  Creatinine: 0.72  GFR: 100  Weight: 71.214  BMI: 24.6    # Type 2 diabetes mellitus  - Please continue lantus *** units at bedtime.   - Continue lispro *** units before each meal.  - Continue lispro moderate / low dose sliding scale before meals and at bedtime.  - Patient's fingerstick glucose goal is 100-180 mg/dL.    - For discharge, patient can ***.    - Patient can follow up at discharge with [facility] by calling [phone] to make an appointment.      Case discussed with Dr. Hoang. Primary team updated.       Fabrice Madrid    Endocrinology Fellow    Service Pager: [phone]    SUBJECTIVE / INTERVAL HPI: Patient was seen and examined this morning. The patient’s diet was advanced to regular consistent carbohydrate for dinner last night. Per flowsheet, there was an interruption in tube feeds between 3PM and 9PM. Then, tube feeds were resumed with Vital 1.5 ji at 50 mL/hr from yesterday at 5PM until today at 7AM. He's pending possible discharge to rehabilitation in 3/19/23.    DIET:   - Dinner: Soup.  - Breakfast: Eggs, adams.   - Lunch: [place], mashed potatoes.     CAPILLARY BLOOD GLUCOSE & INSULIN RECEIVED  105 mg/dL (03-15 @ 23:02) ? 10 units of regular insulin.   203 mg/dL (03-16 @ 04:55) ? 6 units of regular insulin + 1 unit of sliding scale.   141 mg/dL (03-16 @ 12:01) ?  Ø  194 mg/dL (03-16 @ 16:49) ? 6 units of regular insulin + 1 unit of sliding scale.   106 mg/dL (03-16 @ 23:01) ? 5 units of lantus.   187 mg/dL (03-17 @ 01:58) ? Ø  170 mg/dL (03-17 @ 05:56) ? Ø  175 mg/dL (03-17 @ 07:45) ? 1 unit of lispro sliding scale.   129 mg/dL (03-17 @ 08:56) ? 4 units of lispro.   97 mg/dL (03-17 @ 11:42) ? 4 units of lispro.     REVIEW OF SYSTEMS  Constitutional:  Negative fever or chills.  Cardiovascular:  Negative for chest pain or palpitations.  Respiratory:  Negative for cough or shortness of breath.    Gastrointestinal:  Negative for nausea, vomiting, diarrhea, constipation, or abdominal pain.  Neurologic:  No headache, confusion, dizziness, lightheadedness.    PHYSICAL EXAM  Vital Signs Last 24 Hrs  T(C): 36.8 (17 Mar 2023 14:17), Max: 37.2 (16 Mar 2023 22:01)  T(F): 98.3 (17 Mar 2023 14:17), Max: 98.9 (16 Mar 2023 22:01)  HR: 70 (17 Mar 2023 12:33) (70 - 80)  BP: 103/57 (17 Mar 2023 12:33) (103/57 - 115/63)  BP(mean): 75 (17 Mar 2023 12:33) (75 - 86)  RR: 17 (17 Mar 2023 12:33) (17 - 28)  SpO2: 93% (17 Mar 2023 12:33) (93% - 99%)    Parameters below as of 17 Mar 2023 12:33  Patient On (Oxygen Delivery Method): tracheostomy collar    Constitutional: Awake, alert, male, in no distress.   Neck: (+) Tracheostomy.   Respiratory: clear to auscultation bilaterally.   Cardiovascular: regular rhythm, normal S1 and S2, no audible murmurs.   GI: soft, non-distended, bowel sounds increased.   Extremities: No lower extremity edema.    LABS  CBC - WBC/HGB/HTC/PLT: 7.40/8.7/26.6/127 (03-17-23)  BMP - Na/K/Cl/Bicarb/BUN/Cr/Gluc/AG/eGFR: 129/3.8/92/29/12/0.72/126/8/100 (03-17-23)  Ca - 8.3 (03-17-23)  Phos - 2.7 (03-17-23)  Mg - 1.7 (03-17-23)  LFT - Alb/Tprot/Tbili/Dbili/AlkPhos/ALT/AST: 2.8/--/0.6/<0.2/85/69/44 (03-17-23)  PT/aPTT/INR: 18.1/28.7/1.51 (03-12-23)   Thyroid Stimulating Hormone, Serum: 1.210 (03-08-23)    MEDICATIONS  MEDICATIONS  (STANDING):  atorvastatin 20 milliGRAM(s) Oral at bedtime  cefepime   IVPB 2000 milliGRAM(s) IV Intermittent every 8 hours  enoxaparin Injectable 75 milliGRAM(s) SubCutaneous every 12 hours  escitalopram 5 milliGRAM(s) Oral daily  fluticasone propionate 50 MICROgram(s)/spray Nasal Spray 1 Spray(s) Both Nostrils two times a day  insulin glargine Injectable (LANTUS) 10 Unit(s) SubCutaneous at bedtime  insulin lispro Injectable (ADMELOG) 4 Unit(s) SubCutaneous three times a day before meals  insulin regular  human corrective regimen sliding scale   SubCutaneous every 6 hours  levETIRAcetam 750 milliGRAM(s) Oral two times a day  melatonin 5 milliGRAM(s) Oral at bedtime  metoprolol tartrate 25 milliGRAM(s) Oral every 12 hours    MEDICATIONS  (PRN):  acetaminophen     Tablet .. 650 milliGRAM(s) Oral every 6 hours PRN Temp greater or equal to 38C (100.4F), Mild Pain (1 - 3)  acetylcysteine 20%  Inhalation 4 milliLiter(s) Inhalation every 6 hours PRN secretions  albuterol/ipratropium for Nebulization 3 milliLiter(s) Nebulizer every 6 hours PRN Shortness of Breath and/or Wheezing  benzocaine/menthol Lozenge 1 Lozenge Oral daily PRN Sore Throat  ondansetron Injectable 4 milliGRAM(s) IV Push every 8 hours PRN Nausea and/or Vomiting  sodium chloride 0.9% lock flush 10 milliLiter(s) IV Push every 1 hour PRN Pre/post blood products, medications, blood draw, and to maintain line patency  sodium chloride 3%  Inhalation 4 milliLiter(s) Inhalation every 6 hours PRN secretions    ASSESSMENT / RECOMMENDATIONS  Mr. Norwood is a 68-year-old male with a past medical history of type 2 diabetes mellitus, hyperlipidemia, iron deficiency anemia, tracheal stenosis (s/p tracheostomy) and glioblastoma (found to have a mass in 1/2022, s/p resection of left frontal enhancing tumor with GLEOLAN placed on 1/27/22 with pathology showing gliosarcoma, WHO grade IV s/p radiation and chemotherapy completed on 12/2022, repeat MRI showing recurrence of brain mass on 12/2022) who presented for further management, now s/p cerebral angiogram with Avastin treatment (3/7/23). Post-operative course was complicated by flash pulmonary edema, desaturation, new global aphasia, right sided weakness, lactic acidosis and hyperglycemia. Endocrinology following for recommendations regarding glycemic control.     A1C: 5.8 %  BUN: 12  Creatinine: 0.72  GFR: 100  Weight: 71.214  BMI: 24.6    # Type 2 diabetes mellitus  - Please continue lantus 5 units at bedtime.   - Continue lispro 4 units before each meal.  - Continue lispro low dose sliding scale before meals and at bedtime.  - Patient's fingerstick glucose goal is 100-180 mg/dL.    - Patient can follow up at discharge with [facility] by calling [phone] to make an appointment.      Case discussed with Dr. Hoang. Primary team updated.       Fabrice Madrid    Endocrinology Fellow    Service Pager: [phone]

## 2023-03-17 NOTE — CONSULT NOTE ADULT - SUBJECTIVE AND OBJECTIVE BOX
Patient is a 68y old  Male who presents with a chief complaint of cerebral angiogram with Avastin (17 Mar 2023 01:57)      HPI:  67 y/o right handed male with pmhx of T2DM, HLD, JAGDISH, hyponatremia, tracheal stenosis s/p tracheostomy, and glioblastoma presents for cerebral angiogram with Avastin treatment.    Daughter states in January 2022, patient had onset of confusion, dizziness, and speech difficulty while in [place] and was diagnosed with brain mass. Pt evaluated upon return to the [place] at [facility] ED with CT head revealing left frontal brain mass. Pt underwent resection of left frontal enhancing tumor with GLEOLAN placed 1/27/2022. Path showed gliosarcoma, WHO grade IV, IDH wild-type, EGFR non amplified, MGMT promotor methylated. Pt was discharged to rehab and underwent radiation treatment and Temodar chemotherapy (completed 12/22). MRI 12/22 showed recurrence of brain mass and pt presents today referred by Dr. Mackenzie for trial participation cerebral angiogram with Avastin treatment.    Pt complains of dizziness. Denies headache, nausea, vomiting, weakness, numbness, chest pain, dyspnea.   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      Type 2 diabetes mellitus      Hyperlipidemia      Iron deficiency anemia      Nephrolithiasis      Thrombocytopenia      Hyponatremia      BPH (benign prostatic hyperplasia)      S/P craniotomy      H/O tracheostomy        MEDICATIONS  (STANDING):  atorvastatin 20 milliGRAM(s) Oral at bedtime  cefepime   IVPB 2000 milliGRAM(s) IV Intermittent every 8 hours  enoxaparin Injectable 75 milliGRAM(s) SubCutaneous every 12 hours  escitalopram 5 milliGRAM(s) Oral daily  fluticasone propionate 50 MICROgram(s)/spray Nasal Spray 1 Spray(s) Both Nostrils two times a day  insulin glargine Injectable (LANTUS) 10 Unit(s) SubCutaneous at bedtime  insulin lispro Injectable (ADMELOG) 4 Unit(s) SubCutaneous three times a day before meals  insulin regular  human corrective regimen sliding scale   SubCutaneous every 6 hours  levETIRAcetam 750 milliGRAM(s) Oral two times a day  melatonin 5 milliGRAM(s) Oral at bedtime  metoprolol tartrate 25 milliGRAM(s) Oral every 12 hours    MEDICATIONS  (PRN):  acetaminophen     Tablet .. 650 milliGRAM(s) Oral every 6 hours PRN Temp greater or equal to 38C (100.4F), Mild Pain (1 - 3)  acetylcysteine 20%  Inhalation 4 milliLiter(s) Inhalation every 6 hours PRN secretions  albuterol/ipratropium for Nebulization 3 milliLiter(s) Nebulizer every 6 hours PRN Shortness of Breath and/or Wheezing  benzocaine/menthol Lozenge 1 Lozenge Oral daily PRN Sore Throat  ondansetron Injectable 4 milliGRAM(s) IV Push every 8 hours PRN Nausea and/or Vomiting  sodium chloride 0.9% lock flush 10 milliLiter(s) IV Push every 1 hour PRN Pre/post blood products, medications, blood draw, and to maintain line patency  sodium chloride 3%  Inhalation 4 milliLiter(s) Inhalation every 6 hours PRN secretions      Social History:                  -  Home Living Status :  lives with family in a private home with 1 FOS            -  Prior Home Care Services :  none        Baseline Functional Level Prior to Admission :             - ADL's/ IADL's :  independent            - Ambulatory status prior to admission :   walked with no DME     FAMILY HISTORY:  FH: diabetes mellitus (Father, Mother)    FH: hyperlipidemia (Father)    FH: hypertension (Father, Mother)        CBC Full  -  ( 16 Mar 2023 04:55 )  WBC Count : 7.10 K/uL  RBC Count : 2.73 M/uL  Hemoglobin : 8.9 g/dL  Hematocrit : 27.0 %  Platelet Count - Automated : 125 K/uL  Mean Cell Volume : 98.9 fl  Mean Cell Hemoglobin : 32.6 pg  Mean Cell Hemoglobin Concentration : 33.0 gm/dL  Auto Neutrophil # : x  Auto Lymphocyte # : x  Auto Monocyte # : x  Auto Eosinophil # : x  Auto Basophil # : x  Auto Neutrophil % : x  Auto Lymphocyte % : x  Auto Monocyte % : x  Auto Eosinophil % : x  Auto Basophil % : x      03-16    129<L>  |  92<L>  |  16  ----------------------------<  203<H>  4.1   |  28  |  0.71    Ca    8.4      16 Mar 2023 04:55  Phos  2.9     03-16  Mg     1.8     03-16            Radiology :     < from: Xray Chest 1 View- PORTABLE-Urgent (Xray Chest 1 View- PORTABLE-Urgent .) (03.16.23 @ 12:49) >  ACC: [id number] EXAM:  XR CHEST PORTABLE URGENT 1V   ORDERED BY: ROC ANDERS     PROCEDURE DATE:  03/16/2023          INTERPRETATION:  XR CHEST URGENT dated 3/16/2023 12:49 PM    CLINICAL INFORMATION: Male, 68 years old.  NGT placement.    PRIORSTUDIES: 3/15/2023, 3/14/2023, 3/13/2023, 3/12/2023    FINDINGS:  Lines and Devices: Enteric tube is noted entering the left upper quadrant   with its tip at midline in the upper abdomen.    Mediastinum: Normal cardiomediastinal silhouette.    Lungs:Mild right basilar edema versus atelectasis. Near complete   interval resolution of diffuse airspace opacities in the bilateral lungs.   No pleural effusions. Lung apices are off the field-of-view. Within this   limitation, no visible pneumothorax.    Bones/Soft Tissues: No significant change in the surrounding soft tissues   or osseous structures.      IMPRESSION:  Enteric tube is noted in the appropriate position. Near complete interval   resolution of diffuse airspace opacities in the bilateral lungs with mild   residual opacity at the right lung base.      < from: CT Head No Cont (03.08.23 @ 00:00) >  ACC: [id number] EXAM:  CT BRAIN   ORDERED BY: KENTON TUBBS     PROCEDURE DATE:  03/08/2023          INTERPRETATION:  PROCEDURE: CT head without intravenous contrast    INDICATION: Glioblastoma. Evaluation for stroke.    TECHNIQUE: Serial axial images were obtained from the skull base to the   vertex without the use of intravenous contrast. Coronal and sagittal   reconstructions were created.    PRIOR STUDIES: CT head dated 3/7/2023 and MRI brains dating back to   12/15/2022.    FINDINGS:  VENTRICLES AND SULCI: The ventricles are stable in size and configuration.  EXTRA-AXIAL: There is a stable small extra-axial fluid collection deep to   the frontal lobe. No significant mass effect or midline shift.  INTRA-AXIAL: Stable appearance of leftfrontal lobe resection cavity. No   new intraparenchymal hemorrhage or midline shift. No acute transcortical   infarction identified.  VISUALIZED ORBITS: Within normal limits.  VISUALIZED SINUSES: No air-fluid levels. Mild mucosal thickening of the   right maxillary sinus and left ethmoid air cells.  VISUALIZED MASTOIDS: Well aerated.  CALVARIUM: Status post left frontoparietal craniotomy    IMPRESSION:  Since 3/7/2023, there has been no significant interval change. No acute   intracranial hemorrhage or acute transcortical infarction. Please note,   the ventricles are unchanged in size and configuration dating back to at   least 12/15/2022. There is no evidence of acute hydrocephalus.          Vital Signs Last 24 Hrs  T(C): 36.9 (17 Mar 2023 05:00), Max: 37.2 (16 Mar 2023 22:01)  T(F): 98.4 (17 Mar 2023 05:00), Max: 98.9 (16 Mar 2023 22:01)  HR: 72 (17 Mar 2023 07:39) (72 - 80)  BP: 104/62 (17 Mar 2023 07:39) (104/62 - 115/63)  BP(mean): 78 (17 Mar 2023 07:39) (75 - 86)  RR: 18 (17 Mar 2023 07:39) (18 - 28)  SpO2: 98% (17 Mar 2023 07:39) (94% - 99%)    Parameters below as of 17 Mar 2023 07:39  Patient On (Oxygen Delivery Method): tracheostomy collar  O2 Flow (L/min): 10  O2 Concentration (%): 40  REVIEW OF SYSTEMS:      CONSTITUTIONAL: No fever, weight loss, or fatigue  EYES: No eye pain, visual disturbances, or discharge  ENMT:  No difficulty hearing, tinnitus, vertigo; No sinus or throat pain  NECK: No pain or stiffness  BREASTS: No pain, masses, or nipple discharge  RESPIRATORY: No cough, wheezing, chills or hemoptysis; No shortness of breath  CARDIOVASCULAR: No chest pain, palpitations, dizziness, or leg swelling  GASTROINTESTINAL: No abdominal or epigastric pain. No nausea, vomiting, or hematemesis; No diarrhea or constipation. No melena or hematochezia.  GENITOURINARY: No dysuria, frequency, hematuria, or incontinence  NEUROLOGICAL:  per hpi   SKIN: No itching, burning, rashes, or lesions   LYMPH NODES: No enlarged glands  ENDOCRINE: No heat or cold intolerance; No hair loss  MUSCULOSKELETAL: No joint pain or swelling; No muscle, back, or extremity pain  PSYCHIATRIC: No depression, anxiety, mood swings, or difficulty sleeping  HEME/LYMPH: No easy bruising, or bleeding gums  ALLERGY AND IMMUNOLOGIC: No hives or eczema  VASCULAR: no swelling , erythema        Physical Exam :  thin 68 y o man lying comfortably in semi Rodrigues's position , awake , alert , no acute complaints , feels tired  , trach collar     Neurologic Exam :    Alert and oriented to person , place , date/year , speech fluent w/o dysarthria , follows commands     Cranial Nerves:     II :                         pupils equal , round and reactive to light , visual fields intact   III/ IV/VI :              extraocular movements intact , neg nystagmus , neg ptosis  V :                        facial sensation intact , V1-3 normal  VII :                      face symmetric , no droop , normal eye closure and smile  VIII :                     hearing intact to finger rub bilaterally  IX and X :             no hoarseness , gag intact , palate/ uvula rise symmetrically  XI :                       SCM / trapezius strength intact bilateral  XII :                      no tongue deviation    Motor Exam:          Right UE:               no focal weakness ,  > 4/5 throughout  , no drift                             Left UE:                 no focal weakness ,  > 4/5 throughout  , no drift         Right LE:                no focal weakness ,  > 4/5 throughout        Left LE:                  no focal weakness ,  > 4/5 throughout         Sensation :         intact to light touch x 4 extremities                                 DTR :                     biceps/brachioradialis : equal                              patella/ankle : equal                                                              neg Babinski       Coordination :      Finger to Nose :  neg dysmetria bilaterally     Tubes: feeding    P.T./O.T. assessment 3/16/2023       Pain Assessment/Number Scale (0-10) Adult  Presence of Pain: denies pain/discomfort (Rating = 0)  Pain Rating (0-10): Rest: 0 (no pain/absence of nonverbal indicators of pain)  Pain Rating (0-10): Activity: 0 (no pain/absence of nonverbal indicators of pain)    Safety      AM-PAC Functional Assessment: Basic Mobility  Type of Assessment: Daily assessment  Turning from your back to your side while in a flat bed without using bedrails?: 3 = A little assistance  Moving from lying on your back to sitting on the flat side of a flat bed without using bedrails?: 3 = A little assistance  Moving to and from a bed to a chair (including a wheelchair)?: 3 = A little assistance  Standing up from a chair using your arms (e.g. wheelchair or bedside chair)?: 3 = A little assistance  Walking in hospital room?: 3 = A little assistance  Climbing 3-5 steps with a railing?: 3 = A little assistance  Score: 18   Row Comment: Ask the patient "How much help from another person do you currently need? (If the patient hasn't done an activity recently, how much help from another person do you think he/she needs if he/she tried?)    Cognitive/Neuro      Cognitive/Neuro/Behavioral  Cognitive/Neuro/Behavioral [WDL Definition: Alert; opens eyes spontaneously; arouses to voice or touch; oriented x 4; follows commands; speech spontaneous, logical; purposeful motor response; behavior appropriate to situation]: WDL except  Speech: trached    Language Assistance  Preferred Language to Address Healthcare Preferred Language to Address Healthcare: English    Therapeutic Interventions      Bed Mobility  Bed Mobility Training Rolling/Turning: contact guard;  1 person assist;  nonverbal cues (demo/gestures);  verbal cues  Bed Mobility Training Scooting: contact guard;  1 person assist;  nonverbal cues (demo/gestures);  verbal cues  Bed Mobility Training Supine-to-Sit: contact guard;  1 person assist;  nonverbal cues (demo/gestures);  verbal cues;  bed rails  Bed Mobility Training Limitations: decreased ability to use arms for pushing/pulling;  decreased ability to use legs for bridging/pushing;  impaired ability to control trunk for mobility;  decreased strength;  impaired balance;  impaired postural control;  impaired motor control    Sit-Stand Transfer Training  Transfer Training Sit-to-Stand Transfer: contact guard;  minimum assist (75% patient effort);  verbal cues;  nonverbal cues (demo/gestures);  1 person assist;  rolling walker  Transfer Training Stand-to-Sit Transfer: contact guard;  minimum assist (75% patient effort);  1 person assist;  nonverbal cues (demo/gestures);  verbal cues;  rolling walker  Sit-to-Stand Transfer Training Transfer Safety Analysis: decreased balance;  decreased sequencing ability;  decreased step length;  decreased weight-shifting ability;  decreased strength;  impaired balance;  impaired postural control;  impaired motor control;  rolling walker    Gait Training  Gait Training: contact guard;  minimum assist (75% patient effort);  1 person assist;  nonverbal cues (demo/gestures);  verbal cues;  rolling walker;  75 feet  Gait Analysis: 3-point gait   decreased brandyn;  crouch;  increased time in double stance;  decreased hip/knee flexion;  shuffling;  decreased step length;  decreased trunk rotation;  decreased weight-shifting ability;  decreased strength;  impaired balance;  impaired postural control;  impaired motor control;  75 feet;  rolling walker    Therapeutic Exercise  Therapeutic Exercise Detail: BLE sitting in bedside chair x10: LAQ, marching, ankle pumps       Therapeutic Exercise  Therapeutic Exercise Detail: Pt performed functional mobility with CGA and RW in hallway, moderate R lateral LOB requiring assist from therapist to recover.     Neuromuscular Re-education  Neuromuscular Re-education Detail: Finger to nose: RUE WNL, LUE with slight dymetria noted.     Lower Body Dressing Training  Lower Body Dressing Training Assistance: contact guard;  verbal cues;  nonverbal cues (demo/gestures);  1 person assist;  pull up R sock seated EOB;  decreased strength;  impaired coordination;  impaired balance    Upper Body Dressing Training  Upper Body Dressing Training Assistance: minimum assist (75% patient effort);  nonverbal cues (demo/gestures);  1 person assist;  verbal cues;  lamin gown around back seated EOB;  decreased strength;  impaired coordination;  impaired balance    OT Cognitive Treatment  OT Treatment: Cognitive Charges: A&Ox3. Pt follows 100% one step commands this session. Pt with improved cognition and safety awareness this session. (Pt better able to communicate needs as pt has a passy hai valve on this session).           PM&R Impression :     1) admitted for recurrence of brain mass and trial participation cerebral angiogram with Avastin treatment    2) no focal neurologic deficits    3) not at functional baseline            Disposition plan recommendation : Patient is an excellent candidate for acute rehab placement .    1) Patient has an acute neurologic diagnosis .    2) Patient is extremely motivated in rehab and is actively participating in PT and OT and requires ongoing multiple therapy disciplines .    3) Patient is not at baseline functional level .    4) Patient requires an intensive inpatient acute rehabilitation therapy and can tolerate > 3 hrs of combined PT and OT per day and at least 5 days per week with a reasonable expectation that the patient will make measurable functional improvement that only an acute rehab program can provide .    5) Patient requires Physiatry supervision specializing in acute inpatient rehab care .     6) Has strong family support for post acute rehab stay .

## 2023-03-17 NOTE — CONSULT NOTE ADULT - ASSESSMENT
per Neurosurgery    68 y o right handed male with pmhx of T2DM, HLD, JAGDISH, hyponatremia, tracheal stenosis s/p tracheostomy, and glioblastoma now s/p cerebral angiogram with Avastin treatment (3/7/23). c/b cardiogenic shock, cardiomyopathy, improving.     Neuro   - Vitals/neuro q4   - CTA/CTP/repeat CTH negative   - Pain control   - Cont home Keppra 750 BID   - MRI w/wo on stepdown   - continue home med: Continue Lexapro 5 mg daily     Cards  - SBP    - TTE EF 15-20%, akinetic apex and midsegments suggestive of Takotsubo  - Repeat echo 3/13, EF 20% needs repeat echo on 3/20 per cardiology   - Per cards: Lopressor 25 BID,   - s/p amiodarone load for SVT, and PO BID, D/c'ed 3/17 per cards, likely 2/2 to sepsis     Pulm  - + Trach collar, tolerating passy hai valve   - Duonebs, mucomyst, saline nebs for secretions prn   - Pulm following, signed off   - trach exchanged to 6 shiley cuffless 3/16 by ENT    GI  - Consistent carb diet  - Passed FEES 3/16, d/c TFs if tolerating adequate PO diet 3/17  - NGT - Vital 1.5 @ 65 + banatrol d/t diarrhea   - Bowel regimen held for loose stool, + banatrol, last BM 3/16     RENAL:   - Chronic hyponatremia   - NaCl 2 g q 12 dc'd  - Voiding     HEME:  - DVT ppx: SQL 75 bid, SCD L side only  - Repeat anti-10a 3/17 @ 10PM (last anti 10a 0.43 on 3/16)  - LE dopplers + R IM calf DVT, 3/14 chronic R IM calf DVT  - Heme consulted for thrombocytopenia - s/p iron x 3 days    ID:   - Last panculture 3/12, Sputum psudomonas + staph aureus, +serratia marcesans   - Cefepime 2 g q 8 (3/12 - 3/18) x 7 d   - C.diff negative      ENDO:   - DKA resolved    - DM: A1C 5.8, low dose ISS   - Lantus 10 U at bedtime, Reg insulin 5 u q 6 h per endo     DISPO:   - stepdown status, full code, dispo pending     D/W Dr. Ho and Dr. Huggins

## 2023-03-17 NOTE — DISCHARGE NOTE PROVIDER - HOSPITAL COURSE
HPI:  69 y/o right handed male with pmhx of T2DM, HLD, JAGDISH, hyponatremia, tracheal stenosis s/p tracheostomy, and glioblastoma presents for cerebral angiogram with Avastin treatment.    Daughter states in January 2022, patient had onset of confusion, dizziness, and speech difficulty while in [place] and was diagnosed with brain mass. Pt evaluated upon return [place] the [place] at [facility] ED with CT head revealing left frontal brain mass. Pt underwent resection of left frontal enhancing tumor with GLEOLAN placed 1/27/2022. Path showed gliosarcoma, WHO grade IV, IDH wild-type, EGFR non amplified, MGMT promotor methylated. Pt was discharged to rehab and underwent radiation treatment and Temodar chemotherapy (completed 12/22). MRI 12/22 showed recurrence of brain mass and pt presents today referred by Dr. Mackenzie for trial participation cerebral angiogram with Avastin treatment.    Pt complains of dizziness. Denies headache, nausea, vomiting, weakness, numbness, chest pain, dyspnea.      Hospital Course:  3/7: POD0 cerebral angiogram IA avastin. post op in cath lab hypertensive, +flash pulmonary edema w/ desaturation, given diuretics. on cpap   3/8: POD1. o/n new global aphasia, right side w/d. CTH/CTA/CTP performed. ABG o/n with metabolic acidosis, lactate 7.9 and hyperglycemic 320s. started on insulin gtt, 1L NS bolus and NS@200cc/hr. desatting on cpap, placed on full vent support. propofol for sedation. in SVT, resolved with 5 lopresor. fluids stopped due to worsening CXR. repeat lactate 12, given 1amp bicarb then started on bicarb gtt. POCUS this am with hypokinetic left ventricle, pending echo. Cards/nephro/ endo consulted for further recs. Insulin gtt dc'd. VEEG monitoring. S/p 3%Na.  SQL started tonight. S/p 10 NPH after FS of 166. steven gtt. S/p 40 IV lasix. Troponin downtrending 0.32 to 0.18. EEG negative for seizures. Pancultured spiked 101.5. UA negative. Bicarb gtt dc'd. TTE EF15-20%, akinetic apex and midsegments suggestive of takotsubo  3/9: POD2. ANA LILIA o/n neuro stable. remains sedated on propofol, on levo gtt. Propofol dc'd. s/p lasix 40 mg IV x 1. CXR L effusion improved. Started lantus 10u at bedtime per endocrine. Campuzano removed, f/u TOV.    3/10: POD3 ANA LILIA overnight. Neuro exam stable. multiple bouts of diarrhea overnight and AM. Tube feeds held. s/p albumin 250 cc +  cc for tachycardia in the setting of negative fluid status. Tolerating CPAP. Tolerating trach collar x 3 hours, switched back to CPAP overnight. Off levo gtt.   3/11: POD4 Episode of O2 desaturation to low 90s while on 40% FiO2. FiO2 increased to 60% briefly with improvement in O2, good breath sounds throughout, Pt denies SOB and CXR stable. Neuro exam stable. CXR with worsening congestion, given Lasix 40mg IV, on full vent support. Heme consulted for thrombocytopenia. Fever 101F. Episode of SVT with HR to 180s and hypotensive to 80s. Started on Steven, EKG and IV tylenol. Cards fellow called to bedside. Lopressor 5mg IV given with good resolution. Vital signs normalized. Patient remained neuro intact. Started on maintenance lopressor 12.5bid and amio gtt per cards. Given another Lasix 40mg IV at 6pm. Added regular insulin 2 units q 6 per endo.  3/12: POD5 Pt remains on full vent support. Neuro exam stable. Spiked 101.7F fever, pan cultured. Started on vanco/cefepine for empiric coverage. Lasix 40mg IV given in afternoon. Started on IV iron x 3 days, per heme. Regular insulin increased to 6q6, per endo. Amio PO, increased lopressor to 25bid, per cards.  3/13: POD 6. ANA LILIA overnight, exam stable, c-diff sent for multiple loose BMs/febrile/off bowel regimen since 3/7, negative. low grade fever overnight 100.2 pan cx yesterday for fever 101.7. C. diff negative. Cefepime x 7 d for psudomonal PNA. Repeat echo done, EF 20%. Osmolite changed to Vital per endo recs for loose stools.   3/14: POD7. ANA LILIA o/n neuro stable. started on full dose SQL for DVT. 20mg lasix given for diuresis. LE dopplers showing chronic R IM calf DVT. Tolerating trach collar. Restarted his home lexapro for agitation. Increased regular insulin to 6 units. Pocus w/ several b-lines given 40 mg lasix, repeat chest xray in am. Regular insulin held for sugar 89.   3/15: POD 8 IA avastin. Overnight Increased regular insulin to 6 units. Pocus w/ several b-lines given 40 mg lasix, repeat chest xray in am. Regular insulin held for sugar 89.   CXR this mornng with pulm congestion, given lasix 40mg IV. Speech/swallow pathologist evaluated today. Plan for FEES tomorrow. Per endo, 4 regular insulin given.  3/16: POD9, overnight dc'd salt tabs, anti-xa 0.4 will recheck after next 2 doses. ENT exchanged to 6 shiley cuffless. FEES done, able to tolerate regular diet with thin liquids. anti-10a low 0.43, increased lovenox to 75BID. Tube feeds held during the day, restarted at 9PM, fingesticks borderline low, lantus 5 units given, regular insulin held.  3/17: POD10, ANA LILIA overnight,stepdown status, amiodarone d/c'ed, monitor for arrhythmias/SVT.      Patient evaluated by PT/OT who recommended: Acute Rehab  Patient is going home     Hospital course c/b: stress induced cardiomyopathy (Takotsubo cardiomyopathy, reduced but stable EF at 20%)     Exam on day of discharge:    Checklist:   - Obtained follow up appointment from NP  - Reviewed final recommendations of inpatient consults  - review discharge planning on provider handoff  - post op imaging completed  - Neurologically stable for discharge  - Vitals stable for discharge   - Afebrile for discharge  - WBC is stable  - Sodium level is normal  - Pain is adequately controlled  - Pt has PICC/walker/brace/collar   - LACE score (10 or > needs PCP apt)      HPI:  69 y/o right handed male with pmhx of T2DM, HLD, JAGDISH, hyponatremia, tracheal stenosis s/p tracheostomy, and glioblastoma presents for cerebral angiogram with Avastin treatment.    Daughter states in January 2022, patient had onset of confusion, dizziness, and speech difficulty while in [place] and was diagnosed with brain mass. Pt evaluated upon return to the [place] at [facility] ED with CT head revealing left frontal brain mass. Pt underwent resection of left frontal enhancing tumor with GLEOLAN placed 1/27/2022. Path showed gliosarcoma, WHO grade IV, IDH wild-type, EGFR non amplified, MGMT promotor methylated. Pt was discharged to rehab and underwent radiation treatment and Temodar chemotherapy (completed 12/22). MRI 12/22 showed recurrence of brain mass and pt presents today referred by Dr. Mackenzie for trial participation cerebral angiogram with Avastin treatment.    Pt complains of dizziness. Denies headache, nausea, vomiting, weakness, numbness, chest pain, dyspnea.      Hospital Course:  3/7: POD0 cerebral angiogram IA avastin. post op in cath lab hypertensive, +flash pulmonary edema w/ desaturation, given diuretics. on cpap   3/8: POD1. o/n new global aphasia, right side w/d. CTH/CTA/CTP performed. ABG o/n with metabolic acidosis, lactate 7.9 and hyperglycemic 320s. started on insulin gtt, 1L NS bolus and NS@200cc/hr. desatting on cpap, placed on full vent support. propofol for sedation. in SVT, resolved with 5 lopresor. fluids stopped due to worsening CXR. repeat lactate 12, given 1amp bicarb then started on bicarb gtt. POCUS this am with hypokinetic left ventricle, pending echo. Cards/nephro/ endo consulted for further recs. Insulin gtt dc'd. VEEG monitoring. S/p 3%Na.  SQL started tonight. S/p 10 NPH after FS of 166. steven gtt. S/p 40 IV lasix. Troponin downtrending 0.32 to 0.18. EEG negative for seizures. Pancultured spiked 101.5. UA negative. Bicarb gtt dc'd. TTE EF15-20%, akinetic apex and midsegments suggestive of takotsubo  3/9: POD2. ANA LILIA o/n neuro stable. remains sedated on propofol, on levo gtt. Propofol dc'd. s/p lasix 40 mg IV x 1. CXR L effusion improved. Started lantus 10u at bedtime per endocrine. Campuzano removed, f/u TOV.    3/10: POD3 ANA LILIA overnight. Neuro exam stable. multiple bouts of diarrhea overnight and AM. Tube feeds held. s/p albumin 250 cc +  cc for tachycardia in the setting of negative fluid status. Tolerating CPAP. Tolerating trach collar x 3 hours, switched back to CPAP overnight. Off levo gtt.   3/11: POD4 Episode of O2 desaturation to low 90s while on 40% FiO2. FiO2 increased to 60% briefly with improvement in O2, good breath sounds throughout, Pt denies SOB and CXR stable. Neuro exam stable. CXR with worsening congestion, given Lasix 40mg IV, on full vent support. Heme consulted for thrombocytopenia. Fever 101F. Episode of SVT with HR to 180s and hypotensive to 80s. Started on Steven, EKG and IV tylenol. Cards fellow called to bedside. Lopressor 5mg IV given with good resolution. Vital signs normalized. Patient remained neuro intact. Started on maintenance lopressor 12.5bid and amio gtt per cards. Given another Lasix 40mg IV at 6pm. Added regular insulin 2 units q 6 per endo.  3/12: POD5 Pt remains on full vent support. Neuro exam stable. Spiked 101.7F fever, pan cultured. Started on vanco/cefepine for empiric coverage. Lasix 40mg IV given in afternoon. Started on IV iron x 3 days, per heme. Regular insulin increased to 6q6, per endo. Amio PO, increased lopressor to 25bid, per cards.  3/13: POD 6. ANA LILIA overnight, exam stable, c-diff sent for multiple loose BMs/febrile/off bowel regimen since 3/7, negative. low grade fever overnight 100.2 pan cx yesterday for fever 101.7. C. diff negative. Cefepime x 7 d for psudomonal PNA. Repeat echo done, EF 20%. Osmolite changed to Vital per endo recs for loose stools.   3/14: POD7. ANA LILIA o/n neuro stable. started on full dose SQL for DVT. 20mg lasix given for diuresis. LE dopplers showing chronic R IM calf DVT. Tolerating trach collar. Restarted his home lexapro for agitation. Increased regular insulin to 6 units. Pocus w/ several b-lines given 40 mg lasix, repeat chest xray in am. Regular insulin held for sugar 89.   3/15: POD 8 IA avastin. Overnight Increased regular insulin to 6 units. Pocus w/ several b-lines given 40 mg lasix, repeat chest xray in am. Regular insulin held for sugar 89.   CXR this mornng with pulm congestion, given lasix 40mg IV. Speech/swallow pathologist evaluated today. Plan for FEES tomorrow. Per endo, 4 regular insulin given.  3/16: POD9, overnight dc'd salt tabs, anti-xa 0.4 will recheck after next 2 doses. ENT exchanged to 6 shiley cuffless. FEES done, able to tolerate regular diet with thin liquids. anti-10a low 0.43, increased lovenox to 75BID. Tube feeds held during the day, restarted at 9PM, fingesticks borderline low, lantus 5 units given, regular insulin held.  3/17: POD10, ANA LILIA overnight,stepdown status, amiodarone d/c'ed, monitor for arrhythmias/SVT. LFTs elevated this am, trend (recheck in 2 days). Pend MRI brain, repeat echo on 3/20. Pend anti-Xa this evening at 10pm. Pend possible discharge to rehab Mon 3/19. Entresto 24/26 BID started per cardiology recs. Endo rec lantus 5u and lispro 4u premeal. Antixa appropriate.   3/18: POD11, ANA LILIA overnight, In AM, patient coughing and produced small amount of blood tinged sputum. CXR completed showing diffuse reticular markings s/o fluid overload. 20IV lasix given. AM Na 124, repeat Na 4 hours after lasix was 119. Urine labs sent.  Given 1L NS bolus in consultation with hospitalist. NaCl tabs started 2gq6. 8pm Na 121.   3/19: POD12. AM Na 123 up from 121. S+S rec wearing PMV while taking meds, drinking and when observed coughing to aid in clearing secretions. Ate 2 meals, NGT removed.  3/20: POD13. ANA LILIA o/n neuro stable. Heme onc signed off for thrombocytopenia. TTE showing EF 37%. Lantus 5u dc'd per endocrinology. MRI completed.   3/21: POD14, ANA LILIA o/n, neuro stable. Metoprolol tartrate 25bid changed to metoprolol succinate 50 daily per cardiology.     Patient evaluated by PT/OT who recommended: Acute Rehab  Patient is going home     Hospital course c/b: stress induced cardiomyopathy (Takotsubo cardiomyopathy, reduced but stable EF at 20%)     Exam on day of discharge:  PHYSICAL EXAM:  General: patient seen laying supine in bed in NAD  Neuro: AAOx3, follows commands, opens eyes spontaneously, speech clear and fluent, CNII-XI grossly intact, face symmetric, no pronator drift,  strength 5/5 b/l upper extremities and lower extremities, sensation intact to light touch throughout  HEENT: PERRL, EOMI, +trach  Neck: supple  Cardiac: RRR, S1S2  Pulmonary: chest rise symmetric  Abdomen: soft, nontender, nondistended  Ext: perfusing well  Skin: warm, dry    Checklist:   - Reviewed final recommendations of inpatient consults     HPI:  69 y/o right handed male with pmhx of T2DM, HLD, JAGDISH, hyponatremia, tracheal stenosis s/p tracheostomy, and glioblastoma presents for cerebral angiogram with Avastin treatment.    Daughter states in January 2022, patient had onset of confusion, dizziness, and speech difficulty while in [place] and was diagnosed with brain mass. Pt evaluated upon return to the [place] at [facility] ED with CT head revealing left frontal brain mass. Pt underwent resection of left frontal enhancing tumor with GLEOLAN placed 1/27/2022. Path showed gliosarcoma, WHO grade IV, IDH wild-type, EGFR non amplified, MGMT promotor methylated. Pt was discharged to rehab and underwent radiation treatment and Temodar chemotherapy (completed 12/22). MRI 12/22 showed recurrence of brain mass and pt presents today referred by Dr. Mackenzie for trial participation cerebral angiogram with Avastin treatment.    Pt complains of dizziness. Denies headache, nausea, vomiting, weakness, numbness, chest pain, dyspnea.      Hospital Course:  3/7: POD0 cerebral angiogram IA avastin. post op in cath lab hypertensive, +flash pulmonary edema w/ desaturation, given diuretics. on cpap   3/8: POD1. o/n new global aphasia, right side w/d. CTH/CTA/CTP performed. ABG o/n with metabolic acidosis, lactate 7.9 and hyperglycemic 320s. started on insulin gtt, 1L NS bolus and NS@200cc/hr. desatting on cpap, placed on full vent support. propofol for sedation. in SVT, resolved with 5 lopresor. fluids stopped due to worsening CXR. repeat lactate 12, given 1amp bicarb then started on bicarb gtt. POCUS this am with hypokinetic left ventricle, pending echo. Cards/nephro/ endo consulted for further recs. Insulin gtt dc'd. VEEG monitoring. S/p 3%Na.  SQL started tonight. S/p 10 NPH after FS of 166. steven gtt. S/p 40 IV lasix. Troponin downtrending 0.32 to 0.18. EEG negative for seizures. Pancultured spiked 101.5. UA negative. Bicarb gtt dc'd. TTE EF15-20%, akinetic apex and midsegments suggestive of takotsubo  3/9: POD2. ANA LILIA o/n neuro stable. remains sedated on propofol, on levo gtt. Propofol dc'd. s/p lasix 40 mg IV x 1. CXR L effusion improved. Started lantus 10u at bedtime per endocrine. Campuzano removed, f/u TOV.    3/10: POD3 ANA LILIA overnight. Neuro exam stable. multiple bouts of diarrhea overnight and AM. Tube feeds held. s/p albumin 250 cc +  cc for tachycardia in the setting of negative fluid status. Tolerating CPAP. Tolerating trach collar x 3 hours, switched back to CPAP overnight. Off levo gtt.   3/11: POD4 Episode of O2 desaturation to low 90s while on 40% FiO2. FiO2 increased to 60% briefly with improvement in O2, good breath sounds throughout, Pt denies SOB and CXR stable. Neuro exam stable. CXR with worsening congestion, given Lasix 40mg IV, on full vent support. Heme consulted for thrombocytopenia. Fever 101F. Episode of SVT with HR to 180s and hypotensive to 80s. Started on Steven, EKG and IV tylenol. Cards fellow called to bedside. Lopressor 5mg IV given with good resolution. Vital signs normalized. Patient remained neuro intact. Started on maintenance lopressor 12.5bid and amio gtt per cards. Given another Lasix 40mg IV at 6pm. Added regular insulin 2 units q 6 per endo.  3/12: POD5 Pt remains on full vent support. Neuro exam stable. Spiked 101.7F fever, pan cultured. Started on vanco/cefepine for empiric coverage. Lasix 40mg IV given in afternoon. Started on IV iron x 3 days, per heme. Regular insulin increased to 6q6, per endo. Amio PO, increased lopressor to 25bid, per cards.  3/13: POD 6. ANA LILIA overnight, exam stable, c-diff sent for multiple loose BMs/febrile/off bowel regimen since 3/7, negative. low grade fever overnight 100.2 pan cx yesterday for fever 101.7. C. diff negative. Cefepime x 7 d for psudomonal PNA. Repeat echo done, EF 20%. Osmolite changed to Vital per endo recs for loose stools.   3/14: POD7. ANA LILIA o/n neuro stable. started on full dose SQL for DVT. 20mg lasix given for diuresis. LE dopplers showing chronic R IM calf DVT. Tolerating trach collar. Restarted his home lexapro for agitation. Increased regular insulin to 6 units. Pocus w/ several b-lines given 40 mg lasix, repeat chest xray in am. Regular insulin held for sugar 89.   3/15: POD 8 IA avastin. Overnight Increased regular insulin to 6 units. Pocus w/ several b-lines given 40 mg lasix, repeat chest xray in am. Regular insulin held for sugar 89.   CXR this mornng with pulm congestion, given lasix 40mg IV. Speech/swallow pathologist evaluated today. Plan for FEES tomorrow. Per endo, 4 regular insulin given.  3/16: POD9, overnight dc'd salt tabs, anti-xa 0.4 will recheck after next 2 doses. ENT exchanged to 6 shiley cuffless. FEES done, able to tolerate regular diet with thin liquids. anti-10a low 0.43, increased lovenox to 75BID. Tube feeds held during the day, restarted at 9PM, fingesticks borderline low, lantus 5 units given, regular insulin held.  3/17: POD10, ANA LILIA overnight,stepdown status, amiodarone d/c'ed, monitor for arrhythmias/SVT. LFTs elevated this am, trend (recheck in 2 days). Pend MRI brain, repeat echo on 3/20. Pend anti-Xa this evening at 10pm. Pend possible discharge to rehab Mon 3/19. Entresto 24/26 BID started per cardiology recs. Endo rec lantus 5u and lispro 4u premeal. Antixa appropriate.   3/18: POD11, ANA LILIA overnight, In AM, patient coughing and produced small amount of blood tinged sputum. CXR completed showing diffuse reticular markings s/o fluid overload. 20IV lasix given. AM Na 124, repeat Na 4 hours after lasix was 119. Urine labs sent.  Given 1L NS bolus in consultation with hospitalist. NaCl tabs started 2gq6. 8pm Na 121.   3/19: POD12. AM Na 123 up from 121. S+S rec wearing PMV while taking meds, drinking and when observed coughing to aid in clearing secretions. Ate 2 meals, NGT removed.  3/20: POD13. ANA LILIA o/n neuro stable. Heme onc signed off for thrombocytopenia. TTE showing EF 37%. Lantus 5u dc'd per endocrinology. MRI completed.   3/21: POD14, ANA LILIA o/n, neuro stable. Metoprolol tartrate 25bid changed to metoprolol succinate 50 daily per cardiology.     Patient evaluated by PT/OT who recommended: Acute Rehab  Patient is going home     Hospital course c/b: stress induced cardiomyopathy (Takotsubo cardiomyopathy, reduced but stable EF at 20%)     Exam on day of discharge:  PHYSICAL EXAM:  General: patient seen laying supine in bed in NAD  Neuro: AAOx3, follows commands, opens eyes spontaneously, speech clear and fluent, CNII-XI grossly intact, face symmetric, no pronator drift,  strength 5/5 b/l upper extremities and lower extremities, sensation intact to light touch throughout  HEENT: PERRL, EOMI, +trach  Neck: supple  Cardiac: RRR, S1S2  Pulmonary: chest rise symmetric  Abdomen: soft, nontender, nondistended  Ext: perfusing well  Skin: warm, dry

## 2023-03-17 NOTE — PROGRESS NOTE ADULT - SUBJECTIVE AND OBJECTIVE BOX
Cardiology Consult      Interval History:  Telemetry:    OBJECTIVE  Vitals:  T(C): 36.5 (03-17-23 @ 09:03), Max: 37.2 (03-16-23 @ 22:01)  HR: 72 (03-17-23 @ 09:18) (72 - 80)  BP: 111/63 (03-17-23 @ 09:18) (104/62 - 115/63)  RR: 17 (03-17-23 @ 09:04) (17 - 28)  SpO2: 94% (03-17-23 @ 09:18) (93% - 99%)  Wt(kg): --    I/O:  I&O's Summary    16 Mar 2023 07:01  -  17 Mar 2023 07:00  --------------------------------------------------------  IN: 1560 mL / OUT: 1250 mL / NET: 310 mL    17 Mar 2023 07:01  -  17 Mar 2023 10:41  --------------------------------------------------------  IN: 180 mL / OUT: 0 mL / NET: 180 mL        PHYSICAL EXAM:  Appearance: NAD. Speaking in full sentences.   HEENT: No pallor noted.  Conjunctiva clear b/l. Moist oral mucosa.  Cardiovascular: RRR with no murmurs.  Respiratory: Lungs CTAB.   Gastrointestinal:  Soft, nontender. Non-distended. Non-rigid.	  Extremities: No edema b/l. No erythema b/l. LE WWP b/l.  Vascular: DP intact  Neurologic:  Alert and awake. Moving all extremities. Following commands.   	  LABS:                        8.7    7.40  )-----------( 127      ( 17 Mar 2023 09:03 )             26.6     03-17    129<L>  |  92<L>  |  12  ----------------------------<  126<H>  3.8   |  29  |  0.72    Ca    8.3<L>      17 Mar 2023 09:03  Phos  2.7     03-17  Mg     1.7     03-17    TPro  5.7<L>  /  Alb  2.8<L>  /  TBili  0.6  /  DBili  <0.2  /  AST  44<H>  /  ALT  69<H>  /  AlkPhos  85  03-17          RADIOLOGY & ADDITIONAL TESTS:  Reviewed .    MEDICATIONS  (STANDING):  atorvastatin 20 milliGRAM(s) Oral at bedtime  cefepime   IVPB 2000 milliGRAM(s) IV Intermittent every 8 hours  enoxaparin Injectable 75 milliGRAM(s) SubCutaneous every 12 hours  escitalopram 5 milliGRAM(s) Oral daily  fluticasone propionate 50 MICROgram(s)/spray Nasal Spray 1 Spray(s) Both Nostrils two times a day  insulin glargine Injectable (LANTUS) 10 Unit(s) SubCutaneous at bedtime  insulin lispro Injectable (ADMELOG) 4 Unit(s) SubCutaneous three times a day before meals  insulin regular  human corrective regimen sliding scale   SubCutaneous every 6 hours  levETIRAcetam 750 milliGRAM(s) Oral two times a day  magnesium sulfate  IVPB 4 Gram(s) IV Intermittent once  melatonin 5 milliGRAM(s) Oral at bedtime  metoprolol tartrate 25 milliGRAM(s) Oral every 12 hours    MEDICATIONS  (PRN):  acetaminophen     Tablet .. 650 milliGRAM(s) Oral every 6 hours PRN Temp greater or equal to 38C (100.4F), Mild Pain (1 - 3)  acetylcysteine 20%  Inhalation 4 milliLiter(s) Inhalation every 6 hours PRN secretions  albuterol/ipratropium for Nebulization 3 milliLiter(s) Nebulizer every 6 hours PRN Shortness of Breath and/or Wheezing  benzocaine/menthol Lozenge 1 Lozenge Oral daily PRN Sore Throat  ondansetron Injectable 4 milliGRAM(s) IV Push every 8 hours PRN Nausea and/or Vomiting  sodium chloride 0.9% lock flush 10 milliLiter(s) IV Push every 1 hour PRN Pre/post blood products, medications, blood draw, and to maintain line patency  sodium chloride 3%  Inhalation 4 milliLiter(s) Inhalation every 6 hours PRN secretions     Cardiology Consult      Interval History: no events, worked with PT  Telemetry: no events     OBJECTIVE  Vitals:  T(C): 36.5 (03-17-23 @ 09:03), Max: 37.2 (03-16-23 @ 22:01)  HR: 72 (03-17-23 @ 09:18) (72 - 80)  BP: 111/63 (03-17-23 @ 09:18) (104/62 - 115/63)  RR: 17 (03-17-23 @ 09:04) (17 - 28)  SpO2: 94% (03-17-23 @ 09:18) (93% - 99%)  Wt(kg): --    I/O:  I&O's Summary    16 Mar 2023 07:01  -  17 Mar 2023 07:00  --------------------------------------------------------  IN: 1560 mL / OUT: 1250 mL / NET: 310 mL    17 Mar 2023 07:01  -  17 Mar 2023 10:41  --------------------------------------------------------  IN: 180 mL / OUT: 0 mL / NET: 180 mL        PHYSICAL EXAM:  Appearance: NAD.   HEENT: No pallor noted.    Cardiovascular: RRR with no murmurs.  Respiratory: Lungs CTAB.   Gastrointestinal:  Soft  Extremities: No edema b/l.  	  LABS:                        8.7    7.40  )-----------( 127      ( 17 Mar 2023 09:03 )             26.6     03-17    129<L>  |  92<L>  |  12  ----------------------------<  126<H>  3.8   |  29  |  0.72    Ca    8.3<L>      17 Mar 2023 09:03  Phos  2.7     03-17  Mg     1.7     03-17    TPro  5.7<L>  /  Alb  2.8<L>  /  TBili  0.6  /  DBili  <0.2  /  AST  44<H>  /  ALT  69<H>  /  AlkPhos  85  03-17          RADIOLOGY & ADDITIONAL TESTS:  Reviewed .    MEDICATIONS  (STANDING):  atorvastatin 20 milliGRAM(s) Oral at bedtime  cefepime   IVPB 2000 milliGRAM(s) IV Intermittent every 8 hours  enoxaparin Injectable 75 milliGRAM(s) SubCutaneous every 12 hours  escitalopram 5 milliGRAM(s) Oral daily  fluticasone propionate 50 MICROgram(s)/spray Nasal Spray 1 Spray(s) Both Nostrils two times a day  insulin glargine Injectable (LANTUS) 10 Unit(s) SubCutaneous at bedtime  insulin lispro Injectable (ADMELOG) 4 Unit(s) SubCutaneous three times a day before meals  insulin regular  human corrective regimen sliding scale   SubCutaneous every 6 hours  levETIRAcetam 750 milliGRAM(s) Oral two times a day  magnesium sulfate  IVPB 4 Gram(s) IV Intermittent once  melatonin 5 milliGRAM(s) Oral at bedtime  metoprolol tartrate 25 milliGRAM(s) Oral every 12 hours    MEDICATIONS  (PRN):  acetaminophen     Tablet .. 650 milliGRAM(s) Oral every 6 hours PRN Temp greater or equal to 38C (100.4F), Mild Pain (1 - 3)  acetylcysteine 20%  Inhalation 4 milliLiter(s) Inhalation every 6 hours PRN secretions  albuterol/ipratropium for Nebulization 3 milliLiter(s) Nebulizer every 6 hours PRN Shortness of Breath and/or Wheezing  benzocaine/menthol Lozenge 1 Lozenge Oral daily PRN Sore Throat  ondansetron Injectable 4 milliGRAM(s) IV Push every 8 hours PRN Nausea and/or Vomiting  sodium chloride 0.9% lock flush 10 milliLiter(s) IV Push every 1 hour PRN Pre/post blood products, medications, blood draw, and to maintain line patency  sodium chloride 3%  Inhalation 4 milliLiter(s) Inhalation every 6 hours PRN secretions

## 2023-03-17 NOTE — PROGRESS NOTE ADULT - ASSESSMENT
68M PMH T2DM, HLD, JAGDISH, hyponatremia, tracheal stenosis s/p tracheostomy, and glioblastoma s/p resection of the left frontal enhancing tumor admitted for cerebral angiogram with Avastin treatment in the setting of a recurrent mass c/b stress cardiomyopathy 2/2 excess norepinephrine.    Review of studies:  ECG: NSR, 1st degree AVB, 0.5mm- 1mm DAVID v1-v3, IVCD  TTE 3/2021: Normal Bi-V function  TTE with severely reduced LV systolic function EF 15-20%, apex and mid segments are akinetic  TTE 3/8/23: LVEF 15-20% . The entire apex and all the mid segments are akinetic, with relative sparing of the basal segments. In the absence of ischemic heart disease, this is suggestive of Takotsubo's cardiomyopathy. Clinical correlation is advised. With echocontrast imaging, there is no evidence of left ventricular thrombus. Normal right ventricular size and systolic function. Normal atria. Moderate tricuspid regurgitation. PASP 40 mmHg. No pericardial effusion. Mildly dilated aortic root.   TTE 3/13/23: EF is still reduced at 20%    #Stress induced cardiomyopathy (Takotsubo cardiomyopathy)   Likely due to excess norepinephrine given relative good functional capacity, and normal TTE prior to the procedure.  Troponin peaked 0.32, no need to trend further. Off vasopressor support since 3/10. Briefly placed on neosynephrine during SVT episode, otherwise hemodynamically stable once in sinus rhythm.  -EF is still 20% - possibly still Takotsubo as this can take weeks to resolve.   - If EF does not improve, will need ischemic work-up when stable  - Give Lasix as needed  - Would repeat TTE next week.     #SVT/Atrial fibrillation  Telemetry 3/11/23: one episode of atrial fibrillation and SVT to 180s in the late morning - subsequent sinus rhythm s/p IV lopressor.  - Lopressor 25mg BID  - s/p IV amiodarone load. d/c Amiodarone 400mg BID. Patient is rate and rhythm controlled. SVT could have been provoked by sepsis. No further events on tele    68M PMH T2DM, HLD, JAGDISH, hyponatremia, tracheal stenosis s/p tracheostomy, and glioblastoma s/p resection of the left frontal enhancing tumor admitted for cerebral angiogram with Avastin treatment in the setting of a recurrent mass c/b stress cardiomyopathy 2/2 excess norepinephrine.    Review of studies:  ECG: NSR, 1st degree AVB, 0.5mm- 1mm DAVID v1-v3, IVCD  TTE 3/2021: Normal Bi-V function  TTE with severely reduced LV systolic function EF 15-20%, apex and mid segments are akinetic  TTE 3/8/23: LVEF 15-20% . The entire apex and all the mid segments are akinetic, with relative sparing of the basal segments. In the absence of ischemic heart disease, this is suggestive of Takotsubo's cardiomyopathy. Clinical correlation is advised. With echocontrast imaging, there is no evidence of left ventricular thrombus. Normal right ventricular size and systolic function. Normal atria. Moderate tricuspid regurgitation. PASP 40 mmHg. No pericardial effusion. Mildly dilated aortic root.   TTE 3/13/23: EF is still reduced at 20%    #Stress induced cardiomyopathy (Takotsubo cardiomyopathy)   Likely due to excess norepinephrine given relative good functional capacity, and normal TTE prior to the procedure.  Troponin peaked 0.32, no need to trend further. Off vasopressor support since 3/10. Briefly placed on neosynephrine during SVT episode, otherwise hemodynamically stable once in sinus rhythm.  -EF is still 20% - possibly still Takotsubo as this can take weeks to resolve.   - If EF does not improve, will need ischemic work-up when stable  - Give Lasix as needed  - Would repeat TTE next week.   - GDMT: start Entresto 24/26 mg bid, c/w BB    #SVT/Atrial fibrillation  Telemetry 3/11/23: one episode of atrial fibrillation and SVT to 180s in the late morning - subsequent sinus rhythm s/p IV lopressor.  - Lopressor 25mg BID  - s/p IV amiodarone load. d/c Amiodarone 400mg BID. Patient is rate and rhythm controlled. SVT could have been provoked by sepsis. No further events on tele

## 2023-03-17 NOTE — CONSULT NOTE ADULT - REASON FOR ADMISSION
cerebral angiogram with Avastin

## 2023-03-18 NOTE — PROGRESS NOTE ADULT - SUBJECTIVE AND OBJECTIVE BOX
HPI:  69 y/o right handed male with pmhx of T2DM, HLD, JAGDISH, hyponatremia, tracheal stenosis s/p tracheostomy, and glioblastoma presents for cerebral angiogram with Avastin treatment.    Daughter states in January 2022, patient had onset of confusion, dizziness, and speech difficulty while in [place] and was diagnosed with brain mass. Pt evaluated upon return to the [place] at [facility] ED with CT head revealing left frontal brain mass. Pt underwent resection of left frontal enhancing tumor with GLEOLAN placed 1/27/2022. Path showed gliosarcoma, WHO grade IV, IDH wild-type, EGFR non amplified, MGMT promotor methylated. Pt was discharged to rehab and underwent radiation treatment and Temodar chemotherapy (completed 12/22). MRI 12/22 showed recurrence of brain mass and pt presents today referred by Dr. Mackenzie for trial participation cerebral angiogram with Avastin treatment.    Pt complains of dizziness. Denies headache, nausea, vomiting, weakness, numbness, chest pain, dyspnea.   (07 Mar 2023 12:30)    OVERNIGHT EVENTS: ANA LILIA ovn night, neuro/vitals stable. Pend MRI brain, repeat echo on 3/20. Pend possible discharge to rehab Mon 3/19. Entresto 24/26 BID started per cardiology recs. Endo rec lantus 5u and lispro 4u premeal. Antixa appropriate.       Hospital course:   3/7: POD0 cerebral angiogram IA avastin. post op in cath lab hypertensive, +flash pulmonary edema w/ desaturation, given diuretics. on cpap   3/8: POD1. o/n new global aphasia, right side w/d. CTH/CTA/CTP performed. ABG o/n with metabolic acidosis, lactate 7.9 and hyperglycemic 320s. started on insulin gtt, 1L NS bolus and NS@200cc/hr. desatting on cpap, placed on full vent support. propofol for sedation. in SVT, resolved with 5 lopresor. fluids stopped due to worsening CXR. repeat lactate 12, given 1amp bicarb then started on bicarb gtt. POCUS this am with hypokinetic left ventricle, pending echo. Cards/nephro/ endo consulted for further recs. Insulin gtt dc'd. VEEG monitoring. S/p 3%Na.  SQL started tonight. S/p 10 NPH after FS of 166. steven gtt. S/p 40 IV lasix. Troponin downtrending 0.32 to 0.18. EEG negative for seizures. Pancultured spiked 101.5. UA negative. Bicarb gtt dc'd. TTE EF15-20%, akinetic apex and midsegments suggestive of takotsubo  3/9: POD2. ANA LILIA o/n neuro stable. remains sedated on propofol, on levo gtt. Propofol dc'd. s/p lasix 40 mg IV x 1. CXR L effusion improved. Started lantus 10u at bedtime per endocrine. Campuzano removed, f/u TOV.    3/10: POD3 ANA LILIA overnight. Neuro exam stable. multiple bouts of diarrhea overnight and AM. Tube feeds held. s/p albumin 250 cc +  cc for tachycardia in the setting of negative fluid status. Tolerating CPAP. Tolerating trach collar x 3 hours, switched back to CPAP overnight. Off levo gtt.   3/11: POD4 Episode of O2 desaturation to low 90s while on 40% FiO2. FiO2 increased to 60% briefly with improvement in O2, good breath sounds throughout, Pt denies SOB and CXR stable. Neuro exam stable. CXR with worsening congestion, given Lasix 40mg IV, on full vent support. Heme consulted for thrombocytopenia. Fever 101F. Episode of SVT with HR to 180s and hypotensive to 80s. Started on Steven, EKG and IV tylenol. Cards fellow called to bedside. Lopressor 5mg IV given with good resolution. Vital signs normalized. Patient remained neuro intact. Started on maintenance lopressor 12.5bid and amio gtt per cards. Given another Lasix 40mg IV at 6pm. Added regular insulin 2 units q 6 per endo.  3/12: POD5 Pt remains on full vent support. Neuro exam stable. Spiked 101.7F fever, pan cultured. Started on vanco/cefepine for empiric coverage. Lasix 40mg IV given in afternoon. Started on IV iron x 3 days, per heme. Regular insulin increased to 6q6, per endo. Amio PO, increased lopressor to 25bid, per cards.  3/13: POD 6. ANA LILIA overnight, exam stable, c-diff sent for multiple loose BMs/febrile/off bowel regimen since 3/7, negative. low grade fever overnight 100.2 pan cx yesterday for fever 101.7. C. diff negative. Cefepime x 7 d for psudomonal PNA. Repeat echo done, EF 20%. Osmolite changed to Vital per endo recs for loose stools.   3/14: POD7. ANA LILIA o/n neuro stable. started on full dose SQL for DVT. 20mg lasix given for diuresis. LE dopplers showing chronic R IM calf DVT. Tolerating trach collar. Restarted his home lexapro for agitation. Increased regular insulin to 6 units. Pocus w/ several b-lines given 40 mg lasix, repeat chest xray in am. Regular insulin held for sugar 89.   3/15: POD 8 IA avastin. Overnight Increased regular insulin to 6 units. Pocus w/ several b-lines given 40 mg lasix, repeat chest xray in am. Regular insulin held for sugar 89.   CXR this mornng with pulm congestion, given lasix 40mg IV. Speech/swallow pathologist evaluated today. Plan for FEES tomorrow. Per endo, 4 regular insulin given.  3/16: POD9, overnight dc'd salt tabs, anti-xa 0.4 will recheck after next 2 doses. ENT exchanged to 6 shiley cuffless. FEES done, able to tolerate regular diet with thin liquids. anti-10a low 0.43, increased lovenox to 75BID. Tube feeds held during the day, restarted at 9PM, fingesticks borderline low, lantus 5 units given, regular insulin held.  3/17: POD10, ANA LILIA overnight, amiodarone d/c'ed, monitor for arrhythmias/SVT. LFTs elevated this am, trend (recheck in 2 days). Pend MRI brain, repeat echo on 3/20. Pend anti-Xa this evening at 10pm. Pend possible discharge to rehab Mon 3/19. Entresto 24/26 BID started per cardiology recs. Endo rec lantus 5u and lispro 4u premeal. Antixa appropriate.   3/18: POD11, ANA LILIA overnight      Vital Signs Last 24 Hrs  T(C): 36.7 (17 Mar 2023 22:16), Max: 37 (17 Mar 2023 00:50)  T(F): 98.1 (17 Mar 2023 22:16), Max: 98.6 (17 Mar 2023 00:50)  HR: 66 (18 Mar 2023 00:02) (66 - 80)  BP: 99/57 (18 Mar 2023 00:02) (94/56 - 113/67)  BP(mean): 72 (18 Mar 2023 00:02) (69 - 84)  RR: 18 (18 Mar 2023 00:02) (17 - 19)  SpO2: 92% (18 Mar 2023 00:02) (92% - 98%)    Parameters below as of 18 Mar 2023 00:02  Patient On (Oxygen Delivery Method): tracheostomy collar  O2 Flow (L/min): 10  O2 Concentration (%): 40    I&O's Summary    16 Mar 2023 07:01  -  17 Mar 2023 07:00  --------------------------------------------------------  IN: 1560 mL / OUT: 1250 mL / NET: 310 mL    17 Mar 2023 07:01  -  18 Mar 2023 00:20  --------------------------------------------------------  IN: 860 mL / OUT: 1100 mL / NET: -240 mL        PHYSICAL EXAM:  General: patient seen laying supine in bed in NAD  Neuro: AAOx3 ([place]i speaking), FC, OE spontaneously, speech clear, CNII-XI grossly intact, face symmetric, no pronator drift, strength 5/5 b/l UE and LE  HEENT: PERRL, EOMI  Pulmonary: chest rise symmetric  Abdomen: soft, nontender, nondistended  Ext: perfusing well  Skin: warm, dry  Wound: R groin site c/d/i     TUBES/LINES:  [] trach collar      DIET:  [] NPO  [x] Mechanical  [] Tube feeds    LABS:                        8.7    7.40  )-----------( 127      ( 17 Mar 2023 09:03 )             26.6     03-17    129<L>  |  92<L>  |  12  ----------------------------<  126<H>  3.8   |  29  |  0.72    Ca    8.3<L>      17 Mar 2023 09:03  Phos  2.7     03-17  Mg     1.7     03-17    TPro  5.7<L>  /  Alb  2.8<L>  /  TBili  0.6  /  DBili  <0.2  /  AST  44<H>  /  ALT  69<H>  /  AlkPhos  85  03-17            CAPILLARY BLOOD GLUCOSE      POCT Blood Glucose.: 98 mg/dL (17 Mar 2023 21:37)  POCT Blood Glucose.: 113 mg/dL (17 Mar 2023 16:47)  POCT Blood Glucose.: 97 mg/dL (17 Mar 2023 11:42)  POCT Blood Glucose.: 129 mg/dL (17 Mar 2023 08:56)  POCT Blood Glucose.: 175 mg/dL (17 Mar 2023 07:45)  POCT Blood Glucose.: 170 mg/dL (17 Mar 2023 05:56)  POCT Blood Glucose.: 187 mg/dL (17 Mar 2023 01:58)      Drug Levels: [] N/A    CSF Analysis: [] N/A      Allergies    amoxicillin (Rash)    Intolerances        Home Medications:  acetaminophen 325 mg oral tablet: 2 tab(s) orally every 6 hours, As needed, Mild Pain (1 - 3), Moderate Pain (4 - 6) (07 Mar 2023 12:50)  aspirin 81 mg oral tablet: 1 tab(s) orally once a day (07 Mar 2023 12:50)  levETIRAcetam 750 mg oral tablet: 1 tab(s) orally 2 times a day (07 Mar 2023 12:50)  melatonin 3 mg oral tablet: 1 tab(s) orally once a day (at bedtime) (07 Mar 2023 12:50)  polyethylene glycol 3350 oral powder for reconstitution: 17 gram(s) orally every 12 hours, As Needed for constipation (if no BM x 2 days).  (07 Mar 2023 12:50)  senna oral tablet: 2 tab(s) orally once a day (at bedtime) (07 Mar 2023 12:50)      MEDICATIONS:  MEDICATIONS  (STANDING):  atorvastatin 20 milliGRAM(s) Oral at bedtime  cefepime   IVPB 2000 milliGRAM(s) IV Intermittent every 8 hours  enoxaparin Injectable 75 milliGRAM(s) SubCutaneous every 12 hours  escitalopram 5 milliGRAM(s) Oral daily  fluticasone propionate 50 MICROgram(s)/spray Nasal Spray 1 Spray(s) Both Nostrils two times a day  insulin glargine Injectable (LANTUS) 5 Unit(s) SubCutaneous at bedtime  insulin lispro Injectable (ADMELOG) 4 Unit(s) SubCutaneous three times a day before meals  insulin regular  human corrective regimen sliding scale   SubCutaneous every 6 hours  levETIRAcetam 750 milliGRAM(s) Oral two times a day  melatonin 5 milliGRAM(s) Oral at bedtime  metoprolol tartrate 25 milliGRAM(s) Oral every 12 hours  sacubitril 24 mG/valsartan 26 mG 1 Tablet(s) Oral every 12 hours    MEDICATIONS  (PRN):  acetaminophen     Tablet .. 650 milliGRAM(s) Oral every 6 hours PRN Temp greater or equal to 38C (100.4F), Mild Pain (1 - 3)  acetylcysteine 20%  Inhalation 4 milliLiter(s) Inhalation every 6 hours PRN secretions  albuterol/ipratropium for Nebulization 3 milliLiter(s) Nebulizer every 6 hours PRN Shortness of Breath and/or Wheezing  benzocaine/menthol Lozenge 1 Lozenge Oral daily PRN Sore Throat  ondansetron Injectable 4 milliGRAM(s) IV Push every 8 hours PRN Nausea and/or Vomiting  sodium chloride 0.9% lock flush 10 milliLiter(s) IV Push every 1 hour PRN Pre/post blood products, medications, blood draw, and to maintain line patency  sodium chloride 3%  Inhalation 4 milliLiter(s) Inhalation every 6 hours PRN secretions      CULTURES:  Culture Results:   No growth at 5 days. (03-12 @ 12:22)  Culture Results:   No growth at 5 days. (03-12 @ 12:22)      RADIOLOGY & ADDITIONAL TESTS:      ASSESSMENT:  69 y/o right handed male with pmhx of T2DM, HLD, JAGDISH, hyponatremia, tracheal stenosis s/p tracheostomy, and glioblastoma now s/p cerebral angiogram with Avastin treatment (3/7/23). c/b cardiogenic shock, cardiomyopathy, improving.     Neuro   - Vitals/neuro q4   - CTA/CTP/repeat CTH negative   - Pain control   - Cont home Keppra 750 BID   - MRI w/wo on stepdown pending  - continue home med: Continue Lexapro 5 mg daily     Cards  - SBP    - TTE EF 15-20%, akinetic apex and midsegments suggestive of Takotsubo  - Repeat echo 3/13, EF 20% needs repeat echo on 3/20 per cardiology   - Per cards: Lopressor 25 BID  - s/p amiodarone for SVT D/c'ed 3/17 per cards, likely was 2/2 to sepsis   - Entresto 24/26 BID started 3/17 per cardiology    Pulm  - + Trach collar, tolerating passy hai valve   - Duonebs, mucomyst, saline nebs for secretions prn   - Pulm following, signed off   - trach exchanged to 6 shiley cuffless 3/16 by ENT    GI  - Consistent carb diet, NGT in place  - f/u calorie count monday before removing NGT unless improvement in PO intake  - Bowel regimen held for loose stool, + banatrol, last BM 3/17    RENAL:   - Chronic hyponatremia   - Voiding     HEME:  - DVT ppx: SQL 75 bid, SCD L side only  - Repeat anti-xa 0.56 on 3/17  - LE dopplers + R IM calf DVT, 3/14 chronic R IM calf DVT  - Heme consulted for thrombocytopenia - s/p iron x 3 days    ID:   - Last panculture 3/12, Sputum psudomonas + staph aureus, +serratia marcesans   - Cefepime 2 g q 8 (3/12 - 3/18) x 7 d   - C.diff negative      ENDO:   - DKA resolved    - DM: A1C 5.8, low dose ISS   - Lantus 5 U at bedtime, lispro 4u premeal per endo    DISPO:   - stepdown status, full code, dispo pending     D/W Dr. Ho and Dr. Huggins    Assessment: present when checked     [] GCS   E   V   M     Heart Failure: [] Acute, [] acute on chronic, [] chronic   Heart Failure: [] Diastolic (HFpEF), [] Systolic (HRrEF), [] Combined (HFpEF and HFrEF), [] RHF, [] Pulm HTN, [] Other     [] EUGENIA, [] ATN, [] AIN, [] other   [] CKD1, [] CKD2, [] CKD3, [] CKD4, [] CKD5, [] ESRD     Encephalopathy: [] Metabolic, [] Hepatic, [] Toxic, [] Neurological, [] Other     Abnormal Nutritional Status: [] malnutrition (see nutrition note), []underweight: BMI <19, [] morbid obesity: BMI >40, [] Cachexia     [] Sepsis   [] Hypovolemic shock, [] Cardiogenic shock, [] Hemorrhagic shock, [] Neurogenic shock   [] Acute respiratory failure   [] Cerebral edema, [] Brain compression / herniation   [] Functional quadriplegia   [] Acute blood loss anemia

## 2023-03-18 NOTE — PROGRESS NOTE ADULT - SUBJECTIVE AND OBJECTIVE BOX
Patient is a 68y old  Male who presents with a chief complaint of cerebral angiogram with Avastin (18 Mar 2023 00:19)      SUBJECTIVE:  Patient seen and examined at bedside. RN reports episode of CP and blood tinged cough this AM, Pt reports has had two episodes of cough with chest pain (small, sputum with blood mixed    ROS:  Denies fevers, chills, headache, vision changes, neck pain, cough, SOB, chest pain, Abdominal pain, N/V, dysuria or new rash.  All other ROS negative except as above    Vital Signs Last 12 Hrs  T(F): 98.5 (03-18-23 @ 09:15), Max: 98.5 (03-18-23 @ 09:15)  HR: 66 (03-18-23 @ 08:25) (66 - 68)  BP: 103/61 (03-18-23 @ 08:25) (103/61 - 107/65)  BP(mean): 77 (03-18-23 @ 08:25) (77 - 81)  RR: 18 (03-18-23 @ 10:05) (16 - 18)  SpO2: 98% (03-18-23 @ 10:05) (94% - 98%)  I&O's Summary    17 Mar 2023 07:01  -  18 Mar 2023 07:00  --------------------------------------------------------  IN: 860 mL / OUT: 1400 mL / NET: -540 mL    18 Mar 2023 07:01  -  18 Mar 2023 12:14  --------------------------------------------------------  IN: 0 mL / OUT: 0 mL / NET: 0 mL        PHYSICAL EXAM:  Constitutional: NAD, comfortable in bed.  HEENT: PERRLA, EOMI, no conjunctival pallor or scleral icterus, MMM  Neck: Supple, no JVD  Respiratory: crackles bilat. No WoB trach site c/d/i   Cardiovascular: RRR, normal S1 and S2, no m/r/g.   Gastrointestinal: +BS, soft NTND, no guarding or rebound tenderness, no palpable masses   Extremities: wwp; no cyanosis, clubbing or edema.   Vascular: Pulses equal and strong throughout.   Neurological: AAOx3, no CN deficits, strength and sensation intact throughout.   Skin: No gross skin abnormalities or rashes        LABS:                        10.0   7.09  )-----------( 143      ( 18 Mar 2023 06:38 )             29.3     03-18    124<L>  |  89<L>  |  10  ----------------------------<  111<H>  4.6   |  26  |  0.68    Ca    8.4      18 Mar 2023 06:38  Phos  2.2     03-18  Mg     2.0     03-18    TPro  5.7<L>  /  Alb  2.8<L>  /  TBili  0.6  /  DBili  <0.2  /  AST  44<H>  /  ALT  69<H>  /  AlkPhos  85  03-17            RADIOLOGY & ADDITIONAL TESTS:    MEDICATIONS  (STANDING):  atorvastatin 20 milliGRAM(s) Oral at bedtime  cefepime   IVPB 2000 milliGRAM(s) IV Intermittent every 8 hours  enoxaparin Injectable 75 milliGRAM(s) SubCutaneous every 12 hours  escitalopram 5 milliGRAM(s) Oral daily  fluticasone propionate 50 MICROgram(s)/spray Nasal Spray 1 Spray(s) Both Nostrils two times a day  insulin glargine Injectable (LANTUS) 5 Unit(s) SubCutaneous at bedtime  insulin lispro Injectable (ADMELOG) 4 Unit(s) SubCutaneous three times a day before meals  insulin regular  human corrective regimen sliding scale   SubCutaneous every 6 hours  levETIRAcetam 750 milliGRAM(s) Oral two times a day  melatonin 5 milliGRAM(s) Oral at bedtime  metoprolol tartrate 25 milliGRAM(s) Oral every 12 hours  potassium phosphate / sodium phosphate Powder (PHOS-NaK) 1 Packet(s) Oral every 4 hours  sacubitril 24 mG/valsartan 26 mG 1 Tablet(s) Oral every 12 hours    MEDICATIONS  (PRN):  acetaminophen     Tablet .. 650 milliGRAM(s) Oral every 6 hours PRN Temp greater or equal to 38C (100.4F), Mild Pain (1 - 3)  acetylcysteine 20%  Inhalation 4 milliLiter(s) Inhalation every 6 hours PRN secretions  albuterol/ipratropium for Nebulization 3 milliLiter(s) Nebulizer every 6 hours PRN Shortness of Breath and/or Wheezing  benzocaine/menthol Lozenge 1 Lozenge Oral daily PRN Sore Throat  ondansetron Injectable 4 milliGRAM(s) IV Push every 8 hours PRN Nausea and/or Vomiting  sodium chloride 0.9% lock flush 10 milliLiter(s) IV Push every 1 hour PRN Pre/post blood products, medications, blood draw, and to maintain line patency  sodium chloride 3%  Inhalation 4 milliLiter(s) Inhalation every 6 hours PRN secretions   Patient is a 68y old  Male who presents with a chief complaint of cerebral angiogram with Avastin (18 Mar 2023 00:19)      SUBJECTIVE:  Patient seen and examined at bedside. RN reports episode of CP and blood tinged cough this AM, Pt reports has had two episodes of cough with chest pain (small, sputum with blood mixed with BRB, no clots, no tameka blood). Feels ok otherwise able to eat no dyshagia    ROS:  Denies fevers, chills, headache, vision changes, neck pain, cough, SOB,  Abdominal pain, N/V, dysuria or new rash.  All other ROS negative except as above    Vital Signs Last 12 Hrs  T(F): 98.5 (03-18-23 @ 09:15), Max: 98.5 (03-18-23 @ 09:15)  HR: 66 (03-18-23 @ 08:25) (66 - 68)  BP: 103/61 (03-18-23 @ 08:25) (103/61 - 107/65)  BP(mean): 77 (03-18-23 @ 08:25) (77 - 81)  RR: 18 (03-18-23 @ 10:05) (16 - 18)  SpO2: 98% (03-18-23 @ 10:05) (94% - 98%)  I&O's Summary    17 Mar 2023 07:01  -  18 Mar 2023 07:00  --------------------------------------------------------  IN: 860 mL / OUT: 1400 mL / NET: -540 mL    18 Mar 2023 07:01  -  18 Mar 2023 12:14  --------------------------------------------------------  IN: 0 mL / OUT: 0 mL / NET: 0 mL        PHYSICAL EXAM:  Constitutional: NAD, comfortable in bed.  HEENT: PERRLA, EOMI, no conjunctival pallor or scleral icterus, MMM  Neck: Supple, no JVD  Respiratory: crackles bilat. No WoB trach site c/d/i   Cardiovascular: RRR, normal S1 and S2, no m/r/g.   Gastrointestinal: +BS, soft NTND, no guarding or rebound tenderness, no palpable masses   Extremities: wwp; no cyanosis, clubbing or edema.   Vascular: Pulses equal and strong throughout.   Neurological: AAOx3, no CN deficits, strength and sensation intact throughout.   Skin: No gross skin abnormalities or rashes        LABS:                        10.0   7.09  )-----------( 143      ( 18 Mar 2023 06:38 )             29.3     03-18    124<L>  |  89<L>  |  10  ----------------------------<  111<H>  4.6   |  26  |  0.68    Ca    8.4      18 Mar 2023 06:38  Phos  2.2     03-18  Mg     2.0     03-18    TPro  5.7<L>  /  Alb  2.8<L>  /  TBili  0.6  /  DBili  <0.2  /  AST  44<H>  /  ALT  69<H>  /  AlkPhos  85  03-17            RADIOLOGY & ADDITIONAL TESTS:    MEDICATIONS  (STANDING):  atorvastatin 20 milliGRAM(s) Oral at bedtime  cefepime   IVPB 2000 milliGRAM(s) IV Intermittent every 8 hours  enoxaparin Injectable 75 milliGRAM(s) SubCutaneous every 12 hours  escitalopram 5 milliGRAM(s) Oral daily  fluticasone propionate 50 MICROgram(s)/spray Nasal Spray 1 Spray(s) Both Nostrils two times a day  insulin glargine Injectable (LANTUS) 5 Unit(s) SubCutaneous at bedtime  insulin lispro Injectable (ADMELOG) 4 Unit(s) SubCutaneous three times a day before meals  insulin regular  human corrective regimen sliding scale   SubCutaneous every 6 hours  levETIRAcetam 750 milliGRAM(s) Oral two times a day  melatonin 5 milliGRAM(s) Oral at bedtime  metoprolol tartrate 25 milliGRAM(s) Oral every 12 hours  potassium phosphate / sodium phosphate Powder (PHOS-NaK) 1 Packet(s) Oral every 4 hours  sacubitril 24 mG/valsartan 26 mG 1 Tablet(s) Oral every 12 hours    MEDICATIONS  (PRN):  acetaminophen     Tablet .. 650 milliGRAM(s) Oral every 6 hours PRN Temp greater or equal to 38C (100.4F), Mild Pain (1 - 3)  acetylcysteine 20%  Inhalation 4 milliLiter(s) Inhalation every 6 hours PRN secretions  albuterol/ipratropium for Nebulization 3 milliLiter(s) Nebulizer every 6 hours PRN Shortness of Breath and/or Wheezing  benzocaine/menthol Lozenge 1 Lozenge Oral daily PRN Sore Throat  ondansetron Injectable 4 milliGRAM(s) IV Push every 8 hours PRN Nausea and/or Vomiting  sodium chloride 0.9% lock flush 10 milliLiter(s) IV Push every 1 hour PRN Pre/post blood products, medications, blood draw, and to maintain line patency  sodium chloride 3%  Inhalation 4 milliLiter(s) Inhalation every 6 hours PRN secretions

## 2023-03-18 NOTE — CHART NOTE - NSCHARTNOTEFT_GEN_A_CORE
In AM, patient coughing and produced small amount of blood tinged sputum. CXR completed showing diffuse reticular markings s/o fluid overload. 20IV lasix given. AM Na 124, repeat Na 4 hours after lasix was 119. Urine labs sent.  Given 1L NS bolus in consultation with hospitalist. Pending repeat labs at 8pm.

## 2023-03-18 NOTE — PROGRESS NOTE ADULT - SUBJECTIVE AND OBJECTIVE BOX
Interval Events: Reviewed  Patient seen and examined at bedside.    Patient is a 68y old  Male who presents with a chief complaint of cerebral angiogram with Avastin (19 Mar 2023 11:50)      PAST MEDICAL & SURGICAL HISTORY:  Hypertension      Glioblastoma  Grade 4 Gliosarcoma dx 2022      Type 2 diabetes mellitus      Hyperlipidemia      Iron deficiency anemia      Nephrolithiasis      Thrombocytopenia      Hyponatremia      BPH (benign prostatic hyperplasia)      S/P craniotomy      H/O tracheostomy          MEDICATIONS:  Pulmonary:  acetylcysteine 20%  Inhalation 4 milliLiter(s) Inhalation every 6 hours PRN  albuterol/ipratropium for Nebulization 3 milliLiter(s) Nebulizer every 6 hours PRN  sodium chloride 3%  Inhalation 4 milliLiter(s) Inhalation every 6 hours PRN    Antimicrobials:    Anticoagulants:  enoxaparin Injectable 75 milliGRAM(s) SubCutaneous every 12 hours    Cardiac:  metoprolol tartrate 25 milliGRAM(s) Oral every 12 hours  sacubitril 24 mG/valsartan 26 mG 1 Tablet(s) Oral every 12 hours      Allergies    amoxicillin (Rash)    Intolerances        Vital Signs Last 24 Hrs  T(C): 36.2 (19 Mar 2023 13:09), Max: 36.9 (19 Mar 2023 04:37)  T(F): 97.2 (19 Mar 2023 13:09), Max: 98.4 (19 Mar 2023 04:37)  HR: 62 (19 Mar 2023 16:25) (56 - 84)  BP: 113/66 (19 Mar 2023 16:25) (111/70 - 140/82)  BP(mean): 82 (19 Mar 2023 16:25) (82 - 105)  RR: 17 (19 Mar 2023 16:25) (17 - 18)  SpO2: 98% (19 Mar 2023 16:25) (94% - 100%)    Parameters below as of 19 Mar 2023 16:25  Patient On (Oxygen Delivery Method): tracheostomy collar  O2 Flow (L/min): 10  O2 Concentration (%): 40    03-18 @ 07:01  -  03-19 @ 07:00  --------------------------------------------------------  IN: 650 mL / OUT: 3100 mL / NET: -2450 mL    03-19 @ 07:01   @ 17:08  --------------------------------------------------------  IN: 950 mL / OUT: 850 mL / NET: 100 mL          Review of Systems:   •	General: negative  •	Skin/Breast: negative  •	Ophthalmologic: negative  •	ENMT: negative  •	Respiratory and Thorax: negative  •	Cardiovascular: negative  •	Gastrointestinal: negative  •	Genitourinary: negative  •	Musculoskeletal: negative  •	Neurological: negative  •	Psychiatric: negative  •	Hematology/Lymphatics: negative  •	Endocrine: negative  •	Allergic/Immunologic: negative    Physical Exam:   • Constitutional:	refer to the dietion /Nutritionist note  • Eyes:	EOMI; PERRL; no drainage or redness  • ENMT:	No oral lesions; no gross abnormalities  • Neck	No bruits; no thyromegaly or nodules  • Breasts:	not examined  • Back:	No deformity or limitation of movement  • Respiratory:	Breath Sounds equal & clear to percussion & auscultation, no accessory muscle use  • Cardiovascular:	Regular rate & rhythm, normal S1, S2; no murmurs, gallops or rubs; no S3, S4  • Gastrointestinal:	Soft, non-tender, no hepatosplenomegaly, normal bowel sounds  • Genitourinary:	not examined  • Rectal: not examined  • Extremities:	No cyanosis, clubbing or edema  • Vascular:	Equal and normal pulses (carotid, femoral, dorsalis pedis)  • Neurologica:l	not examined  • Skin:	No lesions; no rash  • Lymph Nodes:	No lymphadedenopathy  • Musculoskeletal:	No joint pain, swelling or deformity; no limitation of movement        LABS:      CBC Full  -  ( 19 Mar 2023 06:36 )  WBC Count : 5.91 K/uL  RBC Count : 2.97 M/uL  Hemoglobin : 9.8 g/dL  Hematocrit : 29.9 %  Platelet Count - Automated : 124 K/uL  Mean Cell Volume : 100.7 fl  Mean Cell Hemoglobin : 33.0 pg  Mean Cell Hemoglobin Concentration : 32.8 gm/dL  Auto Neutrophil # : x  Auto Lymphocyte # : x  Auto Monocyte # : x  Auto Eosinophil # : x  Auto Basophil # : x  Auto Neutrophil % : x  Auto Lymphocyte % : x  Auto Monocyte % : x  Auto Eosinophil % : x  Auto Basophil % : x        123<L>  |  90<L>  |  12  ----------------------------<  131<H>  4.4   |  25  |  0.82    Ca    8.0<L>      19 Mar 2023 06:36  Phos  2.5       Mg     2.1         TPro  6.0  /  Alb  3.2<L>  /  TBili  0.5  /  DBili  x   /  AST  32  /  ALT  49<H>  /  AlkPhos  81            Urinalysis Basic - ( 18 Mar 2023 17:16 )    Color: x / Appearance: x / S.015 / pH: x  Gluc: x / Ketone: x  / Bili: x / Urobili: x   Blood: x / Protein: x / Nitrite: x   Leuk Esterase: x / RBC: x / WBC x   Sq Epi: x / Non Sq Epi: x / Bacteria: x                  RADIOLOGY & ADDITIONAL STUDIES (The following images were personally reviewed):

## 2023-03-19 NOTE — PROCEDURE NOTE - GENERAL PROCEDURE DETAILS
Tape holding the NG tube in place along the nose was removed and tube was pulled. Distal tip of the tube was visualized. Pt tolerated removal of the tube well.

## 2023-03-19 NOTE — SWALLOW BEDSIDE ASSESSMENT ADULT - SWALLOW EVAL: DIAGNOSIS
Pt presented with a grossly functional oropharyngeal swallow, consistent with FEES 3/16. Suspect coughing with thins/pills overnight may have been related to pt not wearing PMV, which can alter swallow mechanism. Also may be related to cough at rest vs aspiration of PO. Recs to continue diet with aspiration precautions, wearing PMV for all PO. Please notify SLP with any concerns.

## 2023-03-19 NOTE — PROGRESS NOTE ADULT - SUBJECTIVE AND OBJECTIVE BOX
HPI:  67 y/o right handed male with pmhx of T2DM, HLD, JAGDISH, hyponatremia, tracheal stenosis s/p tracheostomy, and glioblastoma presents for cerebral angiogram with Avastin treatment.    Daughter states in 2022, patient had onset of confusion, dizziness, and speech difficulty while in [place] and was diagnosed with brain mass. Pt evaluated upon return to the [place] at [facility] ED with CT head revealing left frontal brain mass. Pt underwent resection of left frontal enhancing tumor with GLEOLAN placed 2022. Path showed gliosarcoma, WHO grade IV, IDH wild-type, EGFR non amplified, MGMT promotor methylated. Pt was discharged to rehab and underwent radiation treatment and Temodar chemotherapy (completed ). MRI  showed recurrence of brain mass and pt presents today referred by Dr. Mackenzie for trial participation cerebral angiogram with Avastin treatment.    Pt complains of dizziness. Denies headache, nausea, vomiting, weakness, numbness, chest pain, dyspnea.   (07 Mar 2023 12:30)    INTERVAL EVENTS:  Feeling well overall, tolerating PO diet.     HOSPITAL COURSE:  3/7: POD0 cerebral angiogram IA avastin. post op in cath lab hypertensive, +flash pulmonary edema w/ desaturation, given diuretics. on cpap   3: POD1. o/n new global aphasia, right side w/d. CTH/CTA/CTP performed. ABG o/n with metabolic acidosis, lactate 7.9 and hyperglycemic 320s. started on insulin gtt, 1L NS bolus and NS@200cc/hr. desatting on cpap, placed on full vent support. propofol for sedation. in SVT, resolved with 5 lopresor. fluids stopped due to worsening CXR. repeat lactate 12, given 1amp bicarb then started on bicarb gtt. POCUS this am with hypokinetic left ventricle, pending echo. Cards/nephro/ endo consulted for further recs. Insulin gtt dc'd. VEEG monitoring. S/p 3%Na.  SQL started tonight. S/p 10 NPH after FS of 166. steven gtt. S/p 40 IV lasix. Troponin downtrending 0.32 to 0.18. EEG negative for seizures. Pancultured spiked 101.5. UA negative. Bicarb gtt dc'd. TTE EF15-20%, akinetic apex and midsegments suggestive of takotsubo  3: POD2. ANA LILIA o/n neuro stable. remains sedated on propofol, on levo gtt. Propofol dc'd. s/p lasix 40 mg IV x 1. CXR L effusion improved. Started lantus 10u at bedtime per endocrine. Campuzano removed, f/u TOV.    3/10: POD3 ANA LILIA overnight. Neuro exam stable. multiple bouts of diarrhea overnight and AM. Tube feeds held. s/p albumin 250 cc +  cc for tachycardia in the setting of negative fluid status. Tolerating CPAP. Tolerating trach collar x 3 hours, switched back to CPAP overnight. Off levo gtt.   3/11: POD4 Episode of O2 desaturation to low 90s while on 40% FiO2. FiO2 increased to 60% briefly with improvement in O2, good breath sounds throughout, Pt denies SOB and CXR stable. Neuro exam stable. CXR with worsening congestion, given Lasix 40mg IV, on full vent support. Heme consulted for thrombocytopenia. Fever 101F. Episode of SVT with HR to 180s and hypotensive to 80s. Started on Steven, EKG and IV tylenol. Cards fellow called to bedside. Lopressor 5mg IV given with good resolution. Vital signs normalized. Patient remained neuro intact. Started on maintenance lopressor 12.5bid and amio gtt per cards. Given another Lasix 40mg IV at 6pm. Added regular insulin 2 units q 6 per endo.  3/12: POD5 Pt remains on full vent support. Neuro exam stable. Spiked 101.7F fever, pan cultured. Started on vanco/cefepine for empiric coverage. Lasix 40mg IV given in afternoon. Started on IV iron x 3 days, per heme. Regular insulin increased to 6q6, per endo. Amio PO, increased lopressor to 25bid, per cards.  3/13: POD 6. ANA LILIA overnight, exam stable, c-diff sent for multiple loose BMs/febrile/off bowel regimen since 3/7, negative. low grade fever overnight 100.2 pan cx yesterday for fever 101.7. C. diff negative. Cefepime x 7 d for psudomonal PNA. Repeat echo done, EF 20%. Osmolite changed to Vital per endo recs for loose stools.   3/14: POD7. ANA LILIA o/n neuro stable. started on full dose SQL for DVT. 20mg lasix given for diuresis. LE dopplers showing chronic R IM calf DVT. Tolerating trach collar. Restarted his home lexapro for agitation. Increased regular insulin to 6 units. Pocus w/ several b-lines given 40 mg lasix, repeat chest xray in am. Regular insulin held for sugar 89.   3/15: POD 8 IA avastin. Overnight Increased regular insulin to 6 units. Pocus w/ several b-lines given 40 mg lasix, repeat chest xray in am. Regular insulin held for sugar 89.   CXR this mornng with pulm congestion, given lasix 40mg IV. Speech/swallow pathologist evaluated today. Plan for FEES tomorrow. Per endo, 4 regular insulin given.  3/16: POD9, overnight dc'd salt tabs, anti-xa 0.4 will recheck after next 2 doses. ENT exchanged to 6 shiley cuffless. FEES done, able to tolerate regular diet with thin liquids. anti-10a low 0.43, increased lovenox to 75BID. Tube feeds held during the day, restarted at 9PM, fingesticks borderline low, lantus 5 units given, regular insulin held.  3/17: POD10, ANA LILIA overnight, amiodarone d/c'ed, monitor for arrhythmias/SVT. LFTs elevated this am, trend (recheck in 2 days). Pend MRI brain, repeat echo on 3/20. Pend anti-Xa this evening at 10pm. Pend possible discharge to rehab Mon 3/19. Entresto 24/ BID started per cardiology recs. Endo rec lantus 5u and lispro 4u premeal. Antixa appropriate.   3/18: POD11, ANA LILIA overnight, In AM, patient coughing and produced small amount of blood tinged sputum. CXR completed showing diffuse reticular markings s/o fluid overload. 20IV lasix given. AM Na 124, repeat Na 4 hours after lasix was 119. Urine labs sent.  Given 1L NS bolus in consultation with hospitalist. NaCl tabs started 2gq6. 8pm Na 121.   3/19: F/u AM Na.     Vital Signs Last 24 Hrs  T(C): 36.5 (18 Mar 2023 22:06), Max: 36.9 (18 Mar 2023 09:15)  T(F): 97.7 (18 Mar 2023 22:06), Max: 98.5 (18 Mar 2023 09:15)  HR: 82 (18 Mar 2023 23:40) (66 - 84)  BP: 140/82 (18 Mar 2023 23:40) (103/61 - 140/82)  BP(mean): 105 (18 Mar 2023 23:40) (77 - 105)  RR: 18 (18 Mar 2023 23:40) (16 - 18)  SpO2: 98% (18 Mar 2023 23:40) (94% - 98%)    Parameters below as of 18 Mar 2023 23:40  Patient On (Oxygen Delivery Method): tracheostomy collar  O2 Flow (L/min): 10  O2 Concentration (%): 40    I&O's Summary    17 Mar 2023 07:01  -  18 Mar 2023 07:00  --------------------------------------------------------  IN: 860 mL / OUT: 1400 mL / NET: -540 mL    18 Mar 2023 07:01  -  19 Mar 2023 00:04  --------------------------------------------------------  IN: 650 mL / OUT: 2100 mL / NET: -1450 mL        PHYSICAL EXAM:  General: patient seen laying supine in bed in NAD  Neuro: AAOx3, FC, OE spontaneously, speech clear, CNII-XI grossly intact, ?trace R facial droop, no pronator drift, strength 5/5 b/l UE and LE  HEENT: PERRL, EOMI  Pulmonary: chest rise symmetric  Abdomen: soft, nontender, nondistended  Ext: perfusing well  Skin: warm, dry  Wound: R groin site c/d/i     LABS:                        10.0   7.09  )-----------( 143      ( 18 Mar 2023 06:38 )             29.3     03-18    121<L>  |  88<L>  |  14  ----------------------------<  166<H>  4.4   |  27  |  0.87    Ca    7.9<L>      18 Mar 2023 20:44  Phos  2.7     03-  Mg     1.8         TPro  5.7<L>  /  Alb  2.8<L>  /  TBili  0.6  /  DBili  <0.2  /  AST  44<H>  /  ALT  69<H>  /  AlkPhos  85        Urinalysis Basic - ( 18 Mar 2023 17:16 )    Color: x / Appearance: x / S.015 / pH: x  Gluc: x / Ketone: x  / Bili: x / Urobili: x   Blood: x / Protein: x / Nitrite: x   Leuk Esterase: x / RBC: x / WBC x   Sq Epi: x / Non Sq Epi: x / Bacteria: x          CAPILLARY BLOOD GLUCOSE      POCT Blood Glucose.: 163 mg/dL (18 Mar 2023 21:39)  POCT Blood Glucose.: 166 mg/dL (18 Mar 2023 18:10)  POCT Blood Glucose.: 106 mg/dL (18 Mar 2023 15:59)  POCT Blood Glucose.: 143 mg/dL (18 Mar 2023 11:13)  POCT Blood Glucose.: 108 mg/dL (18 Mar 2023 06:33)  POCT Blood Glucose.: 98 mg/dL (18 Mar 2023 00:26)      Drug Levels: [] N/A    CSF Analysis: [] N/A      Allergies    amoxicillin (Rash)    Intolerances      MEDICATIONS:  Antibiotics:    Neuro:  acetaminophen     Tablet .. 650 milliGRAM(s) Oral every 6 hours PRN  escitalopram 5 milliGRAM(s) Oral daily  levETIRAcetam 750 milliGRAM(s) Oral two times a day  melatonin 5 milliGRAM(s) Oral at bedtime  ondansetron Injectable 4 milliGRAM(s) IV Push every 8 hours PRN    Anticoagulation:  enoxaparin Injectable 75 milliGRAM(s) SubCutaneous every 12 hours    OTHER:  acetylcysteine 20%  Inhalation 4 milliLiter(s) Inhalation every 6 hours PRN  albuterol/ipratropium for Nebulization 3 milliLiter(s) Nebulizer every 6 hours PRN  atorvastatin 20 milliGRAM(s) Oral at bedtime  benzocaine/menthol Lozenge 1 Lozenge Oral daily PRN  fluticasone propionate 50 MICROgram(s)/spray Nasal Spray 1 Spray(s) Both Nostrils two times a day  insulin glargine Injectable (LANTUS) 5 Unit(s) SubCutaneous at bedtime  insulin lispro Injectable (ADMELOG) 4 Unit(s) SubCutaneous three times a day before meals  insulin regular  human corrective regimen sliding scale   SubCutaneous every 6 hours  metoprolol tartrate 25 milliGRAM(s) Oral every 12 hours  sacubitril 24 mG/valsartan 26 mG 1 Tablet(s) Oral every 12 hours  sodium chloride 3%  Inhalation 4 milliLiter(s) Inhalation every 6 hours PRN    IVF:  sodium chloride 2 Gram(s) Oral every 6 hours    CULTURES:  Culture Results:   No growth at 5 days. ( @ 12:22)  Culture Results:   No growth at 5 days. ( @ 12:22)    RADIOLOGY & ADDITIONAL TESTS:      ASSESSMENT:  67 y/o right handed male with pmhx of T2DM, HLD, JAGDISH, hyponatremia, tracheal stenosis s/p tracheostomy, and glioblastoma now s/p cerebral angiogram with Avastin treatment (3/7/23). c/b cardiogenic shock, cardiomyopathy, improving.     Neuro   - Vitals/neuro q4   - CTA/CTP/repeat CTH negative   - Pain control   - Cont home Keppra 750 BID   - MRI w/wo on stepdown pending  - continue home med: Continue Lexapro 5 mg daily     Cards  - SBP    - TTE EF 15-20%, akinetic apex and midsegments suggestive of Takotsubo  - Repeat echo 3/13, EF 20% needs repeat echo on 3/20 per cardiology   - Per cards: Lopressor 25 BID; Entresto /26 BID started 3/17  - s/p amiodarone for SVT D/c'ed 3/17 per cards, likely was 2/2 to sepsis     Pulm  - + Trach collar, tolerating passy hai valve   - Duonebs, mucomyst, saline nebs for secretions prn   - Pulm following, signed off   - trach exchanged to 6 shiley cuffless 3/16 by ENT  - s/p lasix 20mg IV 3/18    GI  - Consistent carb diet, NGT in place  - f/u calorie count monday before removing NGT unless improvement in PO intake  - Bowel regimen held for loose stool, + banatrol, last BM 3/17    RENAL:   - Chronic hyponatremia   - NaCl tabs started 3/18 2q6  - Voiding     HEME:  - DVT ppx: SQL 75 bid, SCD L side only  - Repeat anti-xa 0.56 on 3/17  - LE dopplers + R IM calf DVT, 3/14 chronic R IM calf DVT  - Heme consulted for thrombocytopenia - s/p iron x 3 days    ID:   - Last panculture 3/12, Sputum psudomonas + staph aureus, +serratia marcesans   - Cefepime 2 g q 8 (3/12 - 3/18) x 7 d   - C.diff negative      ENDO:   - DKA resolved    - DM: A1C 5.8, low dose ISS   - Lantus 5 U at bedtime, lispro 4u premeal per endo    DISPO:   - stepdown status, full code, dispo pending     D/W Dr. Ho

## 2023-03-19 NOTE — SWALLOW BEDSIDE ASSESSMENT ADULT - SPECIFY REASON(S)
To re-assess swallow function in setting of coughing while drinking water and taking pills overnight

## 2023-03-19 NOTE — PROGRESS NOTE ADULT - SUBJECTIVE AND OBJECTIVE BOX
Patient is a 68y old  Male who presents with a chief complaint of cerebral angiogram with Avastin (19 Mar 2023 07:38)      SUBJECTIVE:  Patient seen and examined at bedside. Sitting in bed, eating breakfast, still having intermittent cough. No blood blood     ROS:  Denies fevers, chills, headache, vision changes, neck pain, cough, SOB, chest pain, Abdominal pain, N/V, dysuria or new rash.  All other ROS negative except as above    Vital Signs Last 12 Hrs  T(F): 97.5 (23 @ 09:15), Max: 98.4 (23 @ 04:37)  HR: 70 (23 @ 08:49) (70 - 70)  BP: 118/67 (23 @ 08:49) (118/67 - 118/67)  BP(mean): 87 (23 @ 08:49) (87 - 87)  RR: 17 (23 @ 08:49) (17 - 17)  SpO2: 98% (23 @ 08:49) (98% - 98%)  I&O's Summary    18 Mar 2023 07:  -  19 Mar 2023 07:00  --------------------------------------------------------  IN: 650 mL / OUT: 3100 mL / NET: -2450 mL    19 Mar 2023 07:01  -  19 Mar 2023 11:51  --------------------------------------------------------  IN: 250 mL / OUT: 200 mL / NET: 50 mL        PHYSICAL EXAM:  Constitutional: NAD, comfortable in bed.  HEENT: PERRLA, EOMI, no conjunctival pallor or scleral icterus, MMM. NGT   Neck: Supple, no JVD trach c/d/i   Respiratory: CTA B/L. No w/r/r.   Cardiovascular: RRR, normal S1 and S2, no m/r/g.   Gastrointestinal: +BS, soft NTND, no guarding or rebound tenderness, no palpable masses   Extremities: wwp; no cyanosis, clubbing or edema.   Vascular: Pulses equal and strong throughout.   Neurological: AAOx3, no CN deficits, strength and sensation intact throughout.   Skin: No gross skin abnormalities or rashes        LABS:                        9.8    5.91  )-----------( 124      ( 19 Mar 2023 06:36 )             29.9     -    123<L>  |  90<L>  |  12  ----------------------------<  131<H>  4.4   |  25  |  0.82    Ca    8.0<L>      19 Mar 2023 06:36  Phos  2.5       Mg     2.1         TPro  6.0  /  Alb  3.2<L>  /  TBili  0.5  /  DBili  x   /  AST  32  /  ALT  49<H>  /  AlkPhos  81        Urinalysis Basic - ( 18 Mar 2023 17:16 )    Color: x / Appearance: x / S.015 / pH: x  Gluc: x / Ketone: x  / Bili: x / Urobili: x   Blood: x / Protein: x / Nitrite: x   Leuk Esterase: x / RBC: x / WBC x   Sq Epi: x / Non Sq Epi: x / Bacteria: x          RADIOLOGY & ADDITIONAL TESTS:    MEDICATIONS  (STANDING):  atorvastatin 20 milliGRAM(s) Oral at bedtime  enoxaparin Injectable 75 milliGRAM(s) SubCutaneous every 12 hours  escitalopram 5 milliGRAM(s) Oral daily  fluticasone propionate 50 MICROgram(s)/spray Nasal Spray 1 Spray(s) Both Nostrils two times a day  insulin glargine Injectable (LANTUS) 5 Unit(s) SubCutaneous at bedtime  insulin lispro Injectable (ADMELOG) 4 Unit(s) SubCutaneous three times a day before meals  insulin regular  human corrective regimen sliding scale   SubCutaneous every 6 hours  levETIRAcetam 750 milliGRAM(s) Oral two times a day  melatonin 5 milliGRAM(s) Oral at bedtime  metoprolol tartrate 25 milliGRAM(s) Oral every 12 hours  sacubitril 24 mG/valsartan 26 mG 1 Tablet(s) Oral every 12 hours  sodium chloride 2 Gram(s) Oral every 6 hours    MEDICATIONS  (PRN):  acetaminophen     Tablet .. 650 milliGRAM(s) Oral every 6 hours PRN Temp greater or equal to 38C (100.4F), Mild Pain (1 - 3)  acetylcysteine 20%  Inhalation 4 milliLiter(s) Inhalation every 6 hours PRN secretions  albuterol/ipratropium for Nebulization 3 milliLiter(s) Nebulizer every 6 hours PRN Shortness of Breath and/or Wheezing  benzocaine/menthol Lozenge 1 Lozenge Oral daily PRN Sore Throat  ondansetron Injectable 4 milliGRAM(s) IV Push every 8 hours PRN Nausea and/or Vomiting  sodium chloride 3%  Inhalation 4 milliLiter(s) Inhalation every 6 hours PRN secretions   Patient is a 68y old  Male who presents with a chief complaint of cerebral angiogram with Avastin (19 Mar 2023 07:38)      SUBJECTIVE:  Patient seen and examined at bedside. Sitting in bed, eating breakfast, still having intermittent cough. No blood blood     ROS:  Denies fevers, chills, headache, vision changes, neck pain, cough, SOB, chest pain, Abdominal pain, N/V, dysuria or new rash.  All other ROS negative except as above    Vital Signs Last 12 Hrs  T(F): 97.5 (23 @ 09:15), Max: 98.4 (23 @ 04:37)  HR: 70 (23 @ 08:49) (70 - 70)  BP: 118/67 (23 @ 08:49) (118/67 - 118/67)  BP(mean): 87 (23 @ 08:49) (87 - 87)  RR: 17 (23 @ 08:49) (17 - 17)  SpO2: 98% (23 @ 08:49) (98% - 98%)  I&O's Summary    18 Mar 2023 07:  -  19 Mar 2023 07:00  --------------------------------------------------------  IN: 650 mL / OUT: 3100 mL / NET: -2450 mL    19 Mar 2023 07:01  -  19 Mar 2023 11:51  --------------------------------------------------------  IN: 250 mL / OUT: 200 mL / NET: 50 mL        PHYSICAL EXAM:  Constitutional: NAD, comfortable in bed.  HEENT: PERRLA, EOMI, no conjunctival pallor or scleral icterus, MMM. NGT   Neck: Supple, no JVD trach c/d/i   Respiratory: CTA B/L. No w/r/r. Much improved compared to previous   Cardiovascular: RRR, normal S1 and S2, no m/r/g.   Gastrointestinal: +BS, soft NTND, no guarding or rebound tenderness, no palpable masses   Extremities: wwp; no cyanosis, clubbing or edema.   Vascular: Pulses equal and strong throughout.   Neurological: AAOx3, no CN deficits, strength and sensation intact throughout.   Skin: No gross skin abnormalities or rashes        LABS:                        9.8    5.91  )-----------( 124      ( 19 Mar 2023 06:36 )             29.9     03-    123<L>  |  90<L>  |  12  ----------------------------<  131<H>  4.4   |  25  |  0.82    Ca    8.0<L>      19 Mar 2023 06:36  Phos  2.5       Mg     2.1         TPro  6.0  /  Alb  3.2<L>  /  TBili  0.5  /  DBili  x   /  AST  32  /  ALT  49<H>  /  AlkPhos  81        Urinalysis Basic - ( 18 Mar 2023 17:16 )    Color: x / Appearance: x / S.015 / pH: x  Gluc: x / Ketone: x  / Bili: x / Urobili: x   Blood: x / Protein: x / Nitrite: x   Leuk Esterase: x / RBC: x / WBC x   Sq Epi: x / Non Sq Epi: x / Bacteria: x          RADIOLOGY & ADDITIONAL TESTS:    MEDICATIONS  (STANDING):  atorvastatin 20 milliGRAM(s) Oral at bedtime  enoxaparin Injectable 75 milliGRAM(s) SubCutaneous every 12 hours  escitalopram 5 milliGRAM(s) Oral daily  fluticasone propionate 50 MICROgram(s)/spray Nasal Spray 1 Spray(s) Both Nostrils two times a day  insulin glargine Injectable (LANTUS) 5 Unit(s) SubCutaneous at bedtime  insulin lispro Injectable (ADMELOG) 4 Unit(s) SubCutaneous three times a day before meals  insulin regular  human corrective regimen sliding scale   SubCutaneous every 6 hours  levETIRAcetam 750 milliGRAM(s) Oral two times a day  melatonin 5 milliGRAM(s) Oral at bedtime  metoprolol tartrate 25 milliGRAM(s) Oral every 12 hours  sacubitril 24 mG/valsartan 26 mG 1 Tablet(s) Oral every 12 hours  sodium chloride 2 Gram(s) Oral every 6 hours    MEDICATIONS  (PRN):  acetaminophen     Tablet .. 650 milliGRAM(s) Oral every 6 hours PRN Temp greater or equal to 38C (100.4F), Mild Pain (1 - 3)  acetylcysteine 20%  Inhalation 4 milliLiter(s) Inhalation every 6 hours PRN secretions  albuterol/ipratropium for Nebulization 3 milliLiter(s) Nebulizer every 6 hours PRN Shortness of Breath and/or Wheezing  benzocaine/menthol Lozenge 1 Lozenge Oral daily PRN Sore Throat  ondansetron Injectable 4 milliGRAM(s) IV Push every 8 hours PRN Nausea and/or Vomiting  sodium chloride 3%  Inhalation 4 milliLiter(s) Inhalation every 6 hours PRN secretions

## 2023-03-19 NOTE — CHART NOTE - NSCHARTNOTEFT_GEN_A_CORE
POD12. AM Na 123 up from 121. S+S rec wearing PMV while taking meds, drinking and when observed coughing to aid in clearing secretions. Ate 2 meals, NGT removed

## 2023-03-19 NOTE — SWALLOW BEDSIDE ASSESSMENT ADULT - NS SPL SWALLOW CLINIC TRIAL FT
PMV in place. Slightly wet vocal quality at rest cleared with trials of thin liquids. No overt signs of aspiration observed.

## 2023-03-19 NOTE — PROVIDER CONTACT NOTE (OTHER) - ASSESSMENT
pt denies any chest pain. VSS (pts baseline HR is tachycardic see flow sheet). Neuro assessment WNL.
see vitals 1700, neuro status wnl and no acute changes, complains of a pain level two near his heart
See flow sheet for vitals from 9:45 amd-10 am. Neuro status- a/ox4, moves all extremities, no drifts, pupils equal and reactive. Patient expressed chest pressure
VSS (see flowsheet), Neuro exam WNL. Central Line Site had no bleeding. Patient denied any pain.
On initial assessment the patient presented new confusion, slight right sided facial droop when smiling and bilateral lower extremity drift. Day shift RN Rhonda Parra present for assessment. PA was notified and asked to come assess patient at this time. 2000 labs drawn. Later in the shift patient began to have coughing episode after drinking water with medications.

## 2023-03-19 NOTE — PROVIDER CONTACT NOTE (OTHER) - SITUATION
Pt laying in bed and suddenly went into SVT to the 180's
RN went to administer medication to patient and witnessed central line dressing on the ground. As RN went to get help, patient pulled out his central line.
Patient started complaining of dull chest pain
Patient is s/p cerebral angiogram with Avastin treatment for GBM. Previous neurological assessments were intact. Initial assessment of the shift demonstrated some new neurological deficits.
pt has inverted t-waves upon arrival to shift

## 2023-03-19 NOTE — PROVIDER CONTACT NOTE (OTHER) - RECOMMENDATIONS
12 lead EKG
Troponin levels, EKG, cardiology
Speech and swallow evaluation recommended.
Cardiology consult stat, adenosine vs metoprolol?, EKG, vasopressor if needed for hypotension
Remove Central Line

## 2023-03-19 NOTE — PROVIDER CONTACT NOTE (OTHER) - REASON
Change in Neuro Exam
Inverted T-Waves
Patient pulled out Right IJ Central Line
SVT to 180's sustained
worsening t wave inversion

## 2023-03-19 NOTE — SWALLOW BEDSIDE ASSESSMENT ADULT - COMMENTS
FEES conducted 3/16 revealed functional oropharyngeal swallow, recs for reg/thin diet.    Pt on 40% trach collar. Per RN, some coughing noted at rest. Notably, pt was not wearing passy-hai valve (PMV) while drinking water/taking meds, when observed coughing.

## 2023-03-19 NOTE — PROGRESS NOTE ADULT - ASSESSMENT
67 y/o right handed male with pmhx of T2DM, HLD, JAGDISH, hyponatremia, tracheal stenosis s/p tracheostomy, and glioblastoma now s/p cerebral angiogram with Avastin treatment (3/7/23). c/b cardiogenic shock, cardiomyopathy, improving.

## 2023-03-19 NOTE — PROVIDER CONTACT NOTE (OTHER) - ACTION/TREATMENT ORDERED:
EKG, stat Troponin levels
Metoprolol 5 mg given, EKG completed, and cardiology fellow at bedside
Removed Cental Line
Labs showed low sodium levels, Sodium phosphate ordered. Neurological symptoms improved through the night after medication completed.
12 LEAD EKG

## 2023-03-19 NOTE — PROVIDER CONTACT NOTE (OTHER) - BACKGROUND
Cerebral angiogram with avastin treatment. EF of 10-15 %
Cerebral angiogram with Avastin Treatment and earlier today went into SVT
Patient is s/p cerebral angiogram with Avastin treatment for GBM. Patient has history of hyponatremia. Previous neurological assessments were intact.
received pt from JUMA Man stating that pt was received with inverted t-waves and as per RN this is pts baseline
patient was referred to [facility] for Avastin Clinical Trial

## 2023-03-19 NOTE — PROGRESS NOTE ADULT - SUBJECTIVE AND OBJECTIVE BOX
Interval Events: Reviewed  Patient seen and examined at bedside.    Patient is a 68y old  Male who presents with a chief complaint of cerebral angiogram with Avastin (19 Mar 2023 00:03)  no acute event overnight, trach      PAST MEDICAL & SURGICAL HISTORY:  Hypertension      Glioblastoma  Grade 4 Gliosarcoma dx 2022      Type 2 diabetes mellitus      Hyperlipidemia      Iron deficiency anemia      Nephrolithiasis      Thrombocytopenia      Hyponatremia      BPH (benign prostatic hyperplasia)      S/P craniotomy      H/O tracheostomy          MEDICATIONS:  Pulmonary:  acetylcysteine 20%  Inhalation 4 milliLiter(s) Inhalation every 6 hours PRN  albuterol/ipratropium for Nebulization 3 milliLiter(s) Nebulizer every 6 hours PRN  sodium chloride 3%  Inhalation 4 milliLiter(s) Inhalation every 6 hours PRN    Antimicrobials:    Anticoagulants:  enoxaparin Injectable 75 milliGRAM(s) SubCutaneous every 12 hours    Cardiac:  metoprolol tartrate 25 milliGRAM(s) Oral every 12 hours  sacubitril 24 mG/valsartan 26 mG 1 Tablet(s) Oral every 12 hours      Allergies    amoxicillin (Rash)    Intolerances        Vital Signs Last 24 Hrs  T(C): 36.9 (19 Mar 2023 04:37), Max: 36.9 (18 Mar 2023 09:15)  T(F): 98.4 (19 Mar 2023 04:37), Max: 98.5 (18 Mar 2023 09:15)  HR: 82 (18 Mar 2023 23:40) (66 - 84)  BP: 140/82 (18 Mar 2023 23:40) (103/61 - 140/82)  BP(mean): 105 (18 Mar 2023 23:40) (77 - 105)  RR: 18 (18 Mar 2023 23:40) (17 - 18)  SpO2: 98% (18 Mar 2023 23:40) (94% - 98%)    Parameters below as of 18 Mar 2023 23:40  Patient On (Oxygen Delivery Method): tracheostomy collar  O2 Flow (L/min): 10  O2 Concentration (%): 40    -18 @ 07:01  -  03-19 @ 07:00  --------------------------------------------------------  IN: 650 mL / OUT: 3100 mL / NET: -2450 mL          Review of Systems:   •	General: negative  •	Skin/Breast: negative  •	Ophthalmologic: negative  •	ENMT: negative  •	Respiratory and Thorax: negative  •	Cardiovascular: negative  •	Gastrointestinal: negative  •	Genitourinary: negative  •	Musculoskeletal: negative  •	Neurological: negative  •	Psychiatric: negative  •	Hematology/Lymphatics: negative  •	Endocrine: negative  •	Allergic/Immunologic: negative    Physical Exam:   • Constitutional:	NAD  • Eyes:	EOMI; PERRL; no drainage or redness  • ENMT:	No oral lesions; no gross abnormalities  • Neck	no thyromegaly or nodules, trach collar intact   • Breasts:	not examined  • Back:	No deformity or limitation of movement  • Respiratory:	Breath Sounds equal & clear to auscultation, no accessory muscle use  • Cardiovascular:	Regular rate & rhythm, normal S1, S2; no murmurs, gallops or rubs; no S3, S4  • Gastrointestinal:	Soft, non-tender, no hepatosplenomegaly, normal bowel sounds  • Genitourinary:	not examined  • Rectal: not examined  • Extremities:	No cyanosis, clubbing or edema  • Vascular:	Equal and normal pulses (dorsalis pedis)  • Neurologica:l	not examined  • Skin:	No lesions; no rash  • Lymph Nodes:	No lymphadedenopathy  • Musculoskeletal:	No joint pain, swelling or deformity; no limitation of movement        LABS:      CBC Full  -  ( 19 Mar 2023 06:36 )  WBC Count : 5.91 K/uL  RBC Count : 2.97 M/uL  Hemoglobin : 9.8 g/dL  Hematocrit : 29.9 %  Platelet Count - Automated : 124 K/uL  Mean Cell Volume : 100.7 fl  Mean Cell Hemoglobin : 33.0 pg  Mean Cell Hemoglobin Concentration : 32.8 gm/dL  Auto Neutrophil # : x  Auto Lymphocyte # : x  Auto Monocyte # : x  Auto Eosinophil # : x  Auto Basophil # : x  Auto Neutrophil % : x  Auto Lymphocyte % : x  Auto Monocyte % : x  Auto Eosinophil % : x  Auto Basophil % : x        123<L>  |  90<L>  |  12  ----------------------------<  131<H>  4.4   |  25  |  0.82    Ca    8.0<L>      19 Mar 2023 06:36  Phos  2.5     -  Mg     2.1         TPro  6.0  /  Alb  3.2<L>  /  TBili  0.5  /  DBili  x   /  AST  32  /  ALT  49<H>  /  AlkPhos  81            Urinalysis Basic - ( 18 Mar 2023 17:16 )    Color: x / Appearance: x / S.015 / pH: x  Gluc: x / Ketone: x  / Bili: x / Urobili: x   Blood: x / Protein: x / Nitrite: x   Leuk Esterase: x / RBC: x / WBC x   Sq Epi: x / Non Sq Epi: x / Bacteria: x                  RADIOLOGY & ADDITIONAL STUDIES (The following images were personally reviewed):  Campuzano:                                     No  Urine output:                       adequate  DVT prophylaxis:                 Yes  Flattus:                                  Yes  Bowel movement:              No

## 2023-03-20 NOTE — PROGRESS NOTE ADULT - SUBJECTIVE AND OBJECTIVE BOX
SUBJECTIVE / INTERVAL HPI: Patient was seen and examined this morning. He reports having good appetite. Blood glucose levels have been mostly within target. He's planned for possible transfer to rehab tomorrow.     DIET:   - Dinner: Fish and mashed potatoes.   - Breakfast: eggs and home fries.     CAPILLARY BLOOD GLUCOSE & INSULIN RECEIVED  131 mg/dL (03-19 @ 13:01) ? 4 units of lispro + 1 unit of lispro sliding scale.   93 mg/dL (03-19 @ 17:37) ? Ø (Hold per parameter)  143 mg/dL (03-19 @ 21:52) ? 5 units of lantus.  109 mg/dL (03-20 @ 01:08) ? Ø  123 mg/dL (03-20 @ 05:49) ? Ø  133 mg/dL (03-20 @ 07:56) ? 4 units of lispro.   110 mg/dL (03-20 @ 11:44) ? 4 units of lispro.     REVIEW OF SYSTEMS  Constitutional:  Negative fever or chills.  Cardiovascular:  Negative for chest pain or palpitations.  Respiratory:  Negative for cough or shortness of breath.    Gastrointestinal:  Negative for nausea, vomiting, diarrhea, constipation, or abdominal pain.  Neurologic:  No headache, confusion, dizziness, lightheadedness.    PHYSICAL EXAM  Vital Signs Last 24 Hrs  T(C): 36.9 (20 Mar 2023 17:59), Max: 36.9 (20 Mar 2023 17:59)  T(F): 98.4 (20 Mar 2023 17:59), Max: 98.4 (20 Mar 2023 17:59)  HR: 72 (20 Mar 2023 20:06) (56 - 72)  BP: 110/63 (20 Mar 2023 20:06) (100/59 - 124/71)  BP(mean): 82 (20 Mar 2023 20:06) (74 - 92)  RR: 18 (20 Mar 2023 20:06) (17 - 18)  SpO2: 99% (20 Mar 2023 20:06) (94% - 99%)    Parameters below as of 20 Mar 2023 20:06  Patient On (Oxygen Delivery Method): tracheostomy collar  O2 Flow (L/min): 10  O2 Concentration (%): 40    Constitutional: Awake, alert, male, in no distress.   Neck: (+) Tracheostomy.   Respiratory: clear to auscultation bilaterally.   Cardiovascular: regular rhythm, normal S1 and S2, no audible murmurs.   GI: soft, non-distended, bowel sounds increased.   Extremities: No lower extremity edema.    LABS  CBC - WBC/HGB/HTC/PLT: 4.38/9.3/27.2/123 (03-20-23)  BMP - Na/K/Cl/Bicarb/BUN/Cr/Gluc/AG/eGFR: 125/4.3/95/24/8/0.65/133/6/103 (03-20-23)  Ca - 7.9 (03-20-23)  Phos - 2.1 (03-20-23)  Mg - 1.8 (03-20-23)  LFT - Alb/Tprot/Tbili/Dbili/AlkPhos/ALT/AST: 3.2/--/0.5/--/81/49/32 (03-19-23)  Thyroid Stimulating Hormone, Serum: 1.210 (03-08-23)    MEDICATIONS  MEDICATIONS  (STANDING):  atorvastatin 20 milliGRAM(s) Oral at bedtime  enoxaparin Injectable 75 milliGRAM(s) SubCutaneous every 12 hours  escitalopram 5 milliGRAM(s) Oral daily  fluticasone propionate 50 MICROgram(s)/spray Nasal Spray 1 Spray(s) Both Nostrils two times a day  insulin lispro Injectable (ADMELOG) 4 Unit(s) SubCutaneous three times a day before meals  insulin regular  human corrective regimen sliding scale   SubCutaneous every 6 hours  levETIRAcetam 750 milliGRAM(s) Oral two times a day  melatonin 5 milliGRAM(s) Oral at bedtime  metoprolol tartrate 25 milliGRAM(s) Oral every 12 hours  sacubitril 24 mG/valsartan 26 mG 1 Tablet(s) Oral every 12 hours  sodium chloride 2 Gram(s) Oral every 6 hours    MEDICATIONS  (PRN):  acetaminophen     Tablet .. 650 milliGRAM(s) Oral every 6 hours PRN Temp greater or equal to 38C (100.4F), Mild Pain (1 - 3)  acetylcysteine 20%  Inhalation 4 milliLiter(s) Inhalation every 6 hours PRN secretions  albuterol/ipratropium for Nebulization 3 milliLiter(s) Nebulizer every 6 hours PRN Shortness of Breath and/or Wheezing  benzocaine/menthol Lozenge 1 Lozenge Oral daily PRN Sore Throat  ondansetron Injectable 4 milliGRAM(s) IV Push every 8 hours PRN Nausea and/or Vomiting  sodium chloride 3%  Inhalation 4 milliLiter(s) Inhalation every 6 hours PRN secretions    ASSESSMENT / RECOMMENDATIONS  Mr. Norwood is a 68-year-old male with a past medical history of type 2 diabetes mellitus, hyperlipidemia, iron deficiency anemia, tracheal stenosis (s/p tracheostomy) and glioblastoma (found to have a mass in 1/2022, s/p resection of left frontal enhancing tumor with GLEOLAN placed on 1/27/22 with pathology showing gliosarcoma, WHO grade IV s/p radiation and chemotherapy completed on 12/2022, repeat MRI showing recurrence of brain mass on 12/2022) who presented for further management, now s/p cerebral angiogram with Avastin treatment (3/7/23). Post-operative course was complicated by flash pulmonary edema, desaturation, new global aphasia, right sided weakness, lactic acidosis and hyperglycemia. Clinical status improved and he's now planned for discharge to rehab possibly tomorrow. Endocrinology following for recommendations regarding glycemic control.     A1C: 5.8 %  BUN: 8  Creatinine: 0.65  GFR: 103  Weight: 71.214  BMI: 24.6  EF: 15-20%     # Type 2 diabetes mellitus  - Blood glucose levels have been mostly within target range, with occasional decrease to <100 mg/dL.   - Please STOP lantus for now.   - Continue with lispro 4 units before each meal.   - Continue lispro low dose sliding scale before meals and at bedtime.  - Patient's fingerstick glucose goal is 100-180 mg/dL.    - Discharge regimen to be discussed.   - Patient can follow up at discharge with [facility] by calling [phone] to make an appointment.      Case discussed with Dr. Hoang. Primary team updated.       Fabrice Madrid    Endocrinology Fellow    Service Pager: [phone]

## 2023-03-20 NOTE — PROGRESS NOTE ADULT - SUBJECTIVE AND OBJECTIVE BOX
Cardiology Consult      Interval History: patient was seen and examined in am. No complaints     OBJECTIVE  Vitals:  T(C): 36.9 (03-20-23 @ 17:59), Max: 36.9 (03-20-23 @ 17:59)  HR: 68 (03-20-23 @ 17:24) (56 - 72)  BP: 124/71 (03-20-23 @ 17:24) (100/59 - 124/71)  RR: 18 (03-20-23 @ 17:24) (17 - 18)  SpO2: 97% (03-20-23 @ 17:24) (94% - 99%)  Wt(kg): --    I/O:  I&O's Summary    19 Mar 2023 07:01  -  20 Mar 2023 07:00  --------------------------------------------------------  IN: 950 mL / OUT: 2100 mL / NET: -1150 mL    20 Mar 2023 07:01  -  20 Mar 2023 19:18  --------------------------------------------------------  IN: 480 mL / OUT: 700 mL / NET: -220 mL        PHYSICAL EXAM:  Appearance: NAD. Speaking in full sentences.   HEENT: No pallor noted.  Conjunctiva clear b/l. Moist oral mucosa.  Cardiovascular: RRR with no murmurs.  Respiratory: Lungs CTAB.   Gastrointestinal:  Soft, nontender. Non-distended. Non-rigid.	  Extremities: No edema b/l. No erythema b/l. LE WWP b/l.  Vascular: DP intact  Neurologic:  Alert and awake. Moving all extremities. Following commands.   	  LABS:                        9.3    4.38  )-----------( 123      ( 20 Mar 2023 05:30 )             27.2     03-20    125<L>  |  95<L>  |  8   ----------------------------<  133<H>  4.3   |  24  |  0.65    Ca    7.9<L>      20 Mar 2023 05:30  Phos  2.1     03-20  Mg     1.8     03-20    TPro  6.0  /  Alb  3.2<L>  /  TBili  0.5  /  DBili  x   /  AST  32  /  ALT  49<H>  /  AlkPhos  81  03-19          RADIOLOGY & ADDITIONAL TESTS:  Reviewed .    MEDICATIONS  (STANDING):  atorvastatin 20 milliGRAM(s) Oral at bedtime  enoxaparin Injectable 75 milliGRAM(s) SubCutaneous every 12 hours  escitalopram 5 milliGRAM(s) Oral daily  fluticasone propionate 50 MICROgram(s)/spray Nasal Spray 1 Spray(s) Both Nostrils two times a day  insulin glargine Injectable (LANTUS) 5 Unit(s) SubCutaneous at bedtime  insulin lispro Injectable (ADMELOG) 4 Unit(s) SubCutaneous three times a day before meals  insulin regular  human corrective regimen sliding scale   SubCutaneous every 6 hours  levETIRAcetam 750 milliGRAM(s) Oral two times a day  melatonin 5 milliGRAM(s) Oral at bedtime  metoprolol tartrate 25 milliGRAM(s) Oral every 12 hours  sacubitril 24 mG/valsartan 26 mG 1 Tablet(s) Oral every 12 hours  sodium chloride 2 Gram(s) Oral every 6 hours    MEDICATIONS  (PRN):  acetaminophen     Tablet .. 650 milliGRAM(s) Oral every 6 hours PRN Temp greater or equal to 38C (100.4F), Mild Pain (1 - 3)  acetylcysteine 20%  Inhalation 4 milliLiter(s) Inhalation every 6 hours PRN secretions  albuterol/ipratropium for Nebulization 3 milliLiter(s) Nebulizer every 6 hours PRN Shortness of Breath and/or Wheezing  benzocaine/menthol Lozenge 1 Lozenge Oral daily PRN Sore Throat  ondansetron Injectable 4 milliGRAM(s) IV Push every 8 hours PRN Nausea and/or Vomiting  sodium chloride 3%  Inhalation 4 milliLiter(s) Inhalation every 6 hours PRN secretions

## 2023-03-20 NOTE — PROGRESS NOTE ADULT - SUBJECTIVE AND OBJECTIVE BOX
HPI:  67 y/o right handed male with pmhx of T2DM, HLD, JAGDISH, hyponatremia, tracheal stenosis s/p tracheostomy, and glioblastoma presents for cerebral angiogram with Avastin treatment.    Daughter states in 2022, patient had onset of confusion, dizziness, and speech difficulty while in [place] and was diagnosed with brain mass. Pt evaluated upon return to the [place] at [facility] ED with CT head revealing left frontal brain mass. Pt underwent resection of left frontal enhancing tumor with GLEOLAN placed 2022. Path showed gliosarcoma, WHO grade IV, IDH wild-type, EGFR non amplified, MGMT promotor methylated. Pt was discharged to rehab and underwent radiation treatment and Temodar chemotherapy (completed ). MRI  showed recurrence of brain mass and pt presents today referred by Dr. Mackenzie for trial participation cerebral angiogram with Avastin treatment.    Pt complains of dizziness. Denies headache, nausea, vomiting, weakness, numbness, chest pain, dyspnea.   (07 Mar 2023 12:30)    OVERNIGHT EVENTS: ANA LILIA    Hospital Course:   3/7: POD0 cerebral angiogram IA avastin. post op in cath lab hypertensive, +flash pulmonary edema w/ desaturation, given diuretics. on cpap   3/8: POD1. o/n new global aphasia, right side w/d. CTH/CTA/CTP performed. ABG o/n with metabolic acidosis, lactate 7.9 and hyperglycemic 320s. started on insulin gtt, 1L NS bolus and NS@200cc/hr. desatting on cpap, placed on full vent support. propofol for sedation. in SVT, resolved with 5 lopresor. fluids stopped due to worsening CXR. repeat lactate 12, given 1amp bicarb then started on bicarb gtt. POCUS this am with hypokinetic left ventricle, pending echo. Cards/nephro/ endo consulted for further recs. Insulin gtt dc'd. VEEG monitoring. S/p 3%Na.  SQL started tonight. S/p 10 NPH after FS of 166. steven gtt. S/p 40 IV lasix. Troponin downtrending 0.32 to 0.18. EEG negative for seizures. Pancultured spiked 101.5. UA negative. Bicarb gtt dc'd. TTE EF15-20%, akinetic apex and midsegments suggestive of takotsubo  3/9: POD2. ANA LILIA o/n neuro stable. remains sedated on propofol, on levo gtt. Propofol dc'd. s/p lasix 40 mg IV x 1. CXR L effusion improved. Started lantus 10u at bedtime per endocrine. Campuzano removed, f/u TOV.    3/10: POD3 ANA LILIA overnight. Neuro exam stable. multiple bouts of diarrhea overnight and AM. Tube feeds held. s/p albumin 250 cc +  cc for tachycardia in the setting of negative fluid status. Tolerating CPAP. Tolerating trach collar x 3 hours, switched back to CPAP overnight. Off levo gtt.   3/11: POD4 Episode of O2 desaturation to low 90s while on 40% FiO2. FiO2 increased to 60% briefly with improvement in O2, good breath sounds throughout, Pt denies SOB and CXR stable. Neuro exam stable. CXR with worsening congestion, given Lasix 40mg IV, on full vent support. Heme consulted for thrombocytopenia. Fever 101F. Episode of SVT with HR to 180s and hypotensive to 80s. Started on Steven, EKG and IV tylenol. Cards fellow called to bedside. Lopressor 5mg IV given with good resolution. Vital signs normalized. Patient remained neuro intact. Started on maintenance lopressor 12.5bid and amio gtt per cards. Given another Lasix 40mg IV at 6pm. Added regular insulin 2 units q 6 per endo.  3/12: POD5 Pt remains on full vent support. Neuro exam stable. Spiked 101.7F fever, pan cultured. Started on vanco/cefepine for empiric coverage. Lasix 40mg IV given in afternoon. Started on IV iron x 3 days, per heme. Regular insulin increased to 6q6, per endo. Amio PO, increased lopressor to 25bid, per cards.  3/13: POD 6. ANA LILIA overnight, exam stable, c-diff sent for multiple loose BMs/febrile/off bowel regimen since 3/7, negative. low grade fever overnight 100.2 pan cx yesterday for fever 101.7. C. diff negative. Cefepime x 7 d for psudomonal PNA. Repeat echo done, EF 20%. Osmolite changed to Vital per endo recs for loose stools.   3/14: POD7. ANA LILIA o/n neuro stable. started on full dose SQL for DVT. 20mg lasix given for diuresis. LE dopplers showing chronic R IM calf DVT. Tolerating trach collar. Restarted his home lexapro for agitation. Increased regular insulin to 6 units. Pocus w/ several b-lines given 40 mg lasix, repeat chest xray in am. Regular insulin held for sugar 89.   3/15: POD 8 IA avastin. Overnight Increased regular insulin to 6 units. Pocus w/ several b-lines given 40 mg lasix, repeat chest xray in am. Regular insulin held for sugar 89.   CXR this mornng with pulm congestion, given lasix 40mg IV. Speech/swallow pathologist evaluated today. Plan for FEES tomorrow. Per endo, 4 regular insulin given.  3/16: POD9, overnight dc'd salt tabs, anti-xa 0.4 will recheck after next 2 doses. ENT exchanged to 6 shiley cuffless. FEES done, able to tolerate regular diet with thin liquids. anti-10a low 0.43, increased lovenox to 75BID. Tube feeds held during the day, restarted at 9PM, fingesticks borderline low, lantus 5 units given, regular insulin held.  3/17: POD10, ANA LILIA overnight, amiodarone d/c'ed, monitor for arrhythmias/SVT. LFTs elevated this am, trend (recheck in 2 days). Pend MRI brain, repeat echo on 3/20. Pend anti-Xa this evening at 10pm. Pend possible discharge to rehab Mon 3/19. Entresto 24/26 BID started per cardiology recs. Endo rec lantus 5u and lispro 4u premeal. Antixa appropriate.   3/18: POD11, ANA LILIA overnight, In AM, patient coughing and produced small amount of blood tinged sputum. CXR completed showing diffuse reticular markings s/o fluid overload. 20IV lasix given. AM Na 124, repeat Na 4 hours after lasix was 119. Urine labs sent.  Given 1L NS bolus in consultation with hospitalist. NaCl tabs started 2gq6. 8pm Na 121.   3/19: POD12. AM Na 123 up from 121. S+S rec wearing PMV while taking meds, drinking and when observed coughing to aid in clearing secretions. Ate 2 meals, NGT removed.  3/20: POD13. ANA LILIA o/n neuro stable.    Vital Signs Last 24 Hrs  T(C): 36.7 (19 Mar 2023 22:05), Max: 36.9 (19 Mar 2023 04:37)  T(F): 98 (19 Mar 2023 22:05), Max: 98.4 (19 Mar 2023 04:37)  HR: 58 (19 Mar 2023 23:36) (56 - 76)  BP: 103/53 (19 Mar 2023 23:36) (101/58 - 118/67)  BP(mean): 74 (19 Mar 2023 23:36) (73 - 87)  RR: 17 (19 Mar 2023 23:36) (17 - 18)  SpO2: 97% (19 Mar 2023 23:36) (94% - 100%)    Parameters below as of 19 Mar 2023 23:36  Patient On (Oxygen Delivery Method): tracheostomy collar  O2 Flow (L/min): 10  O2 Concentration (%): 40    I&O's Summary    18 Mar 2023 07:01  -  19 Mar 2023 07:00  --------------------------------------------------------  IN: 650 mL / OUT: 3100 mL / NET: -2450 mL    19 Mar 2023 07:01  -  20 Mar 2023 00:26  --------------------------------------------------------  IN: 950 mL / OUT: 1375 mL / NET: -425 mL        PHYSICAL EXAM:  GEN: laying in bed, NAD  NEURO: AOx3. FC, OE spont, speech intact, mild R facial. CNII-XII intact. PERRL, EOMI. No pronator drift. MAEx4. 5/5 strength throughout. SILT  CV: RRR +S1/S2  PULM: CTAB  GI: Abd soft, NT/ND  EXT: ext warm, dry, nontender    TUBES/LINES:  [] Campuzano  [] Lumbar Drain  [] Wound Drains  [] Others      DIET:  [] NPO  [x] Mechanical  [] Tube feeds    LABS:                        9.8    5.91  )-----------( 124      ( 19 Mar 2023 06:36 )             29.9     -    123<L>  |  90<L>  |  12  ----------------------------<  131<H>  4.4   |  25  |  0.82    Ca    8.0<L>      19 Mar 2023 06:36  Phos  2.5       Mg     2.1         TPro  6.0  /  Alb  3.2<L>  /  TBili  0.5  /  DBili  x   /  AST  32  /  ALT  49<H>  /  AlkPhos  81        Urinalysis Basic - ( 18 Mar 2023 17:16 )    Color: x / Appearance: x / S.015 / pH: x  Gluc: x / Ketone: x  / Bili: x / Urobili: x   Blood: x / Protein: x / Nitrite: x   Leuk Esterase: x / RBC: x / WBC x   Sq Epi: x / Non Sq Epi: x / Bacteria: x          CAPILLARY BLOOD GLUCOSE      POCT Blood Glucose.: 143 mg/dL (19 Mar 2023 21:52)  POCT Blood Glucose.: 93 mg/dL (19 Mar 2023 17:37)  POCT Blood Glucose.: 131 mg/dL (19 Mar 2023 13:01)  POCT Blood Glucose.: 197 mg/dL (19 Mar 2023 11:17)  POCT Blood Glucose.: 117 mg/dL (19 Mar 2023 09:03)  POCT Blood Glucose.: 181 mg/dL (19 Mar 2023 06:30)      Drug Levels: [] N/A    CSF Analysis: [] N/A      Allergies    amoxicillin (Rash)    Intolerances      MEDICATIONS:  Antibiotics:    Neuro:  acetaminophen     Tablet .. 650 milliGRAM(s) Oral every 6 hours PRN  escitalopram 5 milliGRAM(s) Oral daily  levETIRAcetam 750 milliGRAM(s) Oral two times a day  melatonin 5 milliGRAM(s) Oral at bedtime  ondansetron Injectable 4 milliGRAM(s) IV Push every 8 hours PRN    Anticoagulation:  enoxaparin Injectable 75 milliGRAM(s) SubCutaneous every 12 hours    OTHER:  acetylcysteine 20%  Inhalation 4 milliLiter(s) Inhalation every 6 hours PRN  albuterol/ipratropium for Nebulization 3 milliLiter(s) Nebulizer every 6 hours PRN  atorvastatin 20 milliGRAM(s) Oral at bedtime  benzocaine/menthol Lozenge 1 Lozenge Oral daily PRN  fluticasone propionate 50 MICROgram(s)/spray Nasal Spray 1 Spray(s) Both Nostrils two times a day  insulin glargine Injectable (LANTUS) 5 Unit(s) SubCutaneous at bedtime  insulin lispro Injectable (ADMELOG) 4 Unit(s) SubCutaneous three times a day before meals  insulin regular  human corrective regimen sliding scale   SubCutaneous every 6 hours  metoprolol tartrate 25 milliGRAM(s) Oral every 12 hours  sacubitril 24 mG/valsartan 26 mG 1 Tablet(s) Oral every 12 hours  sodium chloride 3%  Inhalation 4 milliLiter(s) Inhalation every 6 hours PRN    IVF:  sodium chloride 2 Gram(s) Oral every 6 hours    CULTURES:  Culture Results:   No growth at 5 days. ( @ 12:22)  Culture Results:   No growth at 5 days. ( @ 12:22)    RADIOLOGY & ADDITIONAL TESTS:      ASSESSMENT:  67 y/o right handed male with pmhx of T2DM, HLD, JAGDISH, hyponatremia, tracheal stenosis s/p tracheostomy, and glioblastoma now s/p cerebral angiogram with Avastin treatment (3/7/23). c/b cardiogenic shock, cardiomyopathy, improving.     C71.9    Abnormal electrocardiogram [ECG] [EKG]    Allergy, unspecified, initial encounter    Anemia, unspecified    Benign prostatic hyperplasia without lower urinary tract symptoms    Calculus of kidney    Cough, unspecified    Depression, unspecified    ENCOUNTER FOR ATTENT    Encounter for general adult medical examination without abnormal findings    Encounter for immunization    Encounter for immunization safety counseling    Encounter for other preprocedural examination    Encounter for screening for malignant neoplasm of prostate    Encounter for screening for other metabolic disorders    Encounter for screening for other viral diseases    ESSENTIAL (PRIMARY)    Gastro-esophageal reflux disease without esophagitis    Generalized anxiety disorder    History of Glioblastoma    Hyperlipidemia, unspecified    Hypo-osmolality and hyponatremia    Hypotension, unspecified    Iron deficiency    Iron deficiency anemia, unspecified    LONG TERM (CURRENT)    LONG TERM (CURRENT)    LONG TERM (CURRENT)    Malignant neoplasm of brain, unspecified    Nausea with vomiting, unspecified    Other constipation    Other fatigue    Other injury of unspecified body region, initial encounter    OTHER SPECIFIED DISE    Personal history of malignant neoplasm, unspecified    Personal history of other diseases of the nervous system and sense organs    Personal history of other endocrine, nutritional and metabolic disease    Personal history of other mental and behavioral disorders    Personal history of transient ischemic attack (TIA), and cerebral infarction without residual deficits    POSTPROCEDURAL SUBGL    Pruritus, unspecified    Shortness of breath    Tachycardia, unspecified    Thrombocytopenia, unspecified    Unspecified abdominal pain    Unspecified visual disturbance    Urinary calculus, unspecified    Vitamin D deficiency, unspecified    No pertinent family history in first degree relatives    FH: diabetes mellitus (Father, Mother)    FH: hyperlipidemia (Father)    FH: hypertension (Father, Mother)    Handoff    MEWS Score    No pertinent past medical history    Diabetes mellitus    Hypertension    Glioblastoma    Type 2 diabetes mellitus    Hyperlipidemia    Iron deficiency anemia    Nephrolithiasis    Thrombocytopenia    Hyponatremia    BPH (benign prostatic hyperplasia)    No pertinent past medical history    GBM (glioblastoma multiforme)    GBM (glioblastoma multiforme)    Angiogram, carotid and cerebral arteries    Glioblastoma    Acute respiratory failure with hypoxia    Tracheostomy status    Acute hyponatremia    Glioblastoma    Flash pulmonary edema    Takotsubo cardiomyopathy    Cardiac arrhythmia    DKA (diabetic ketoacidosis)    Gram-negative pneumonia    Hyponatremia    Angiogram, carotid and cerebral arteries    No significant past surgical history    No significant past surgical history    S/P craniotomy    H/O tracheostomy    No significant past surgical history    Acute respiratory failure with hypoxia    Acute hyponatremia    Takotsubo cardiomyopathy    Hyponatremia    Cardiac arrhythmia    DVT, lower extremity    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        PLAN:  Neuro   - Vitals/neuro q4   - CTA/CTP/repeat CTH negative   - Pain control   - Cont home Keppra 750 BID   - MRI w/wo pending  - continue home med: Continue Lexapro 5 mg daily     Cards  - SBP    - TTE EF 15-20%, akinetic apex and midsegments suggestive of Takotsubo  - Repeat echo 3/13, EF 20% needs repeat echo on 3/20 per cardiology   - Per cards: Lopressor 25 BID; Entresto 24/ BID started 3/17  - s/p amiodarone for SVT D/c'ed 3/17 per cards, likely was 2/2 to sepsis     Pulm  - + Trach collar, tolerating passy hai valve   - Duonebs, mucomyst, saline nebs for secretions prn   - Pulm following, signed off   - trach exchanged to 6 shiley cuffless 3/16 by ENT    GI  - Consistent carb diet  - Bowel regimen held for loose stool, + banatrol, last BM 3/17    RENAL:   - Chronic hyponatremia   - NaCl tabs 2q6  - Voiding     HEME:  - DVT ppx: SQL 75 bid, SCD L side only  - Repeat anti-xa 0.56 on 3/17  - LE dopplers + R IM calf DVT, 3/14 chronic R IM calf DVT  - Heme consulted for thrombocytopenia - s/p iron x 3 days    ID:   - Last panculture 3/12, Sputum psudomonas + staph aureus, +serratia marcesans   - Cefepime 2 g q 8 (3/12 - 3/18) x 7 d   - C.diff negative      ENDO:   - DKA resolved    - DM: A1C 5.8, low dose ISS   - Lantus 5 U at bedtime, lispro 4u premeal per endo    DISPO:   - stepdown status, full code, dispo pending     D/W Dr. Ho       Assessment:  Present when checked    []  GCS  E   V  M     Heart Failure: []Acute, [] acute on chronic , []chronic  Heart Failure:  [] Diastolic (HFpEF), [] Systolic (HFrEF), []Combined (HFpEF and HFrEF), [] RHF, [] Pulm HTN, [] Other    [] EUGENIA, [] ATN, [] AIN, [] other  [] CKD1, [] CKD2, [] CKD 3, [] CKD 4, [] CKD 5, []ESRD    Encephalopathy: [] Metabolic, [] Hepatic, [] toxic, [] Neurological, [] Other    Abnormal Nurtitional Status: [] malnurtition (see nutrition note), [ ]underweight: BMI < 19, [] morbid obesity: BMI >40, [] Cachexia    [] Sepsis  [] hypovolemic shock,[] cardiogenic shock, [] hemorrhagic shock, [] neuogenic shock  [] Acute Respiratory Failure  []Cerebral edema, [] Brain compression/ herniation,   [] Functional quadriplegia  [] Acute blood loss anemia

## 2023-03-20 NOTE — PROGRESS NOTE ADULT - NS ATTEST RISK PROBLEM GEN_ALL_CORE FT
Fluctuating glucoses, need to transition to insulin regimen to handle regular food
Need for continued insulin adjustment due to fluctuating glucoses
Need for insulin regimen to handle basal needs and tube feeds
Glucoses still fluctuating, not under optimal control
Need for decision regarding discontinuation of insulin
Inadequate glycemic control
Need for insulin management, modification of feeding regimen
Need for daily adjustment of insulin as tube feeds transitioned to PO diet

## 2023-03-20 NOTE — PROGRESS NOTE ADULT - SUBJECTIVE AND OBJECTIVE BOX
O/N Events: ANA LILIA  Subjective/ROS: Complains of mild pain when he forcibly tries to phonate without speaking valve. Denies HA, CP, SOB, n/v, changes in bowel/urinary habits.  12pt ROS otherwise negative.    VITALS  Vital Signs Last 24 Hrs  T(C): 36.7 (20 Mar 2023 09:13), Max: 36.8 (20 Mar 2023 04:33)  T(F): 98.1 (20 Mar 2023 09:13), Max: 98.2 (20 Mar 2023 04:33)  HR: 64 (20 Mar 2023 11:41) (56 - 68)  BP: 110/69 (20 Mar 2023 11:41) (100/59 - 117/56)  BP(mean): 85 (20 Mar 2023 11:41) (73 - 85)  RR: 17 (20 Mar 2023 11:41) (17 - 18)  SpO2: 97% (20 Mar 2023 11:41) (94% - 100%)    Parameters below as of 20 Mar 2023 11:41  Patient On (Oxygen Delivery Method): room air    I&O's Summary    19 Mar 2023 07:01  -  20 Mar 2023 07:00  --------------------------------------------------------  IN: 950 mL / OUT: 2100 mL / NET: -1150 mL        CAPILLARY BLOOD GLUCOSE  POCT Blood Glucose.: 110 mg/dL (20 Mar 2023 11:44)  POCT Blood Glucose.: 133 mg/dL (20 Mar 2023 07:56)  POCT Blood Glucose.: 123 mg/dL (20 Mar 2023 05:49)  POCT Blood Glucose.: 109 mg/dL (20 Mar 2023 01:08)  POCT Blood Glucose.: 143 mg/dL (19 Mar 2023 21:52)  POCT Blood Glucose.: 93 mg/dL (19 Mar 2023 17:37)  POCT Blood Glucose.: 131 mg/dL (19 Mar 2023 13:01)      PHYSICAL EXAM  Constitutional: NAD, comfortable in bed.  HEENT: PERRLA, EOMI, no conjunctival pallor or scleral icterus, MMM. NGT   Neck: Supple, no JVD trach c/d/i   Respiratory: CTA B/L. No w/r/r. Much improved compared to previous   Cardiovascular: RRR, normal S1 and S2, no m/r/g.   Gastrointestinal: +BS, soft NTND, no guarding or rebound tenderness, no palpable masses   Extremities: wwp; no cyanosis, clubbing or edema.   Vascular: Pulses equal and strong throughout.   Neurological: AAOx3, no CN deficits, strength and sensation intact throughout.   Skin: No gross skin abnormalities or rashes    MEDICATIONS  (STANDING):  atorvastatin 20 milliGRAM(s) Oral at bedtime  enoxaparin Injectable 75 milliGRAM(s) SubCutaneous every 12 hours  escitalopram 5 milliGRAM(s) Oral daily  fluticasone propionate 50 MICROgram(s)/spray Nasal Spray 1 Spray(s) Both Nostrils two times a day  insulin glargine Injectable (LANTUS) 5 Unit(s) SubCutaneous at bedtime  insulin lispro Injectable (ADMELOG) 4 Unit(s) SubCutaneous three times a day before meals  insulin regular  human corrective regimen sliding scale   SubCutaneous every 6 hours  levETIRAcetam 750 milliGRAM(s) Oral two times a day  melatonin 5 milliGRAM(s) Oral at bedtime  metoprolol tartrate 25 milliGRAM(s) Oral every 12 hours  potassium phosphate / sodium phosphate Powder (PHOS-NaK) 1 Packet(s) Oral every 6 hours  sacubitril 24 mG/valsartan 26 mG 1 Tablet(s) Oral every 12 hours  sodium chloride 2 Gram(s) Oral every 6 hours    MEDICATIONS  (PRN):  acetaminophen     Tablet .. 650 milliGRAM(s) Oral every 6 hours PRN Temp greater or equal to 38C (100.4F), Mild Pain (1 - 3)  acetylcysteine 20%  Inhalation 4 milliLiter(s) Inhalation every 6 hours PRN secretions  albuterol/ipratropium for Nebulization 3 milliLiter(s) Nebulizer every 6 hours PRN Shortness of Breath and/or Wheezing  benzocaine/menthol Lozenge 1 Lozenge Oral daily PRN Sore Throat  ondansetron Injectable 4 milliGRAM(s) IV Push every 8 hours PRN Nausea and/or Vomiting  sodium chloride 3%  Inhalation 4 milliLiter(s) Inhalation every 6 hours PRN secretions      amoxicillin (Rash)      LABS                        9.3    4.38  )-----------( 123      ( 20 Mar 2023 05:30 )             27.2     03-20    125<L>  |  95<L>  |  8   ----------------------------<  133<H>  4.3   |  24  |  0.65    Ca    7.9<L>      20 Mar 2023 05:30  Phos  2.1     03-20  Mg     1.8     -20    TPro  6.0  /  Alb  3.2<L>  /  TBili  0.5  /  DBili  x   /  AST  32  /  ALT  49<H>  /  AlkPhos  81  03-19      Urinalysis Basic - ( 18 Mar 2023 17:16 )    Color: x / Appearance: x / S.015 / pH: x  Gluc: x / Ketone: x  / Bili: x / Urobili: x   Blood: x / Protein: x / Nitrite: x   Leuk Esterase: x / RBC: x / WBC x   Sq Epi: x / Non Sq Epi: x / Bacteria: x            IMAGING/EKG/ETC: reviewed

## 2023-03-20 NOTE — PROGRESS NOTE ADULT - ASSESSMENT
67 y/o right handed male with pmhx of T2DM, HLD, JAGDISH, hyponatremia, tracheal stenosis s/p tracheostomy, and glioblastoma presents for cerebral angiogram with Avastin treatment.

## 2023-03-20 NOTE — PROGRESS NOTE ADULT - ATTENDING COMMENTS
Pt seen on rounds this afternoon and events of the weekend reviewed.    Has been eating well.  Denies any dyspnea or wheezing, and his lungs are entirely clear on exam today.  Glucoses have been in the  range since midday yesterday on minimal doses of standing insulin.  Had an echocardiogram today--results not yet available  At this point it appears that the pt may be back to "baseline" with regard to his diabetes, which was controlled on monotherapy with metformin once his steroids had been tapered off late last year.  Unfortunately, metformin cannot be restarted unless his repeat echo shows an improvement in his EF  --Will try discontinuing the Lantus, and assess his overnight control  --Continue his standing premeal insulin, but if control remains good, will likely transition this to Januvia  --Will make final decision prior to discharge tomorrow

## 2023-03-20 NOTE — PROGRESS NOTE ADULT - SUBJECTIVE AND OBJECTIVE BOX
Interval Events: Reviewed  Patient seen and examined at bedside.    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      Type 2 diabetes mellitus      Hyperlipidemia      Iron deficiency anemia      Nephrolithiasis      Thrombocytopenia      Hyponatremia      BPH (benign prostatic hyperplasia)      S/P craniotomy      H/O tracheostomy          MEDICATIONS:  Pulmonary:  acetylcysteine 20%  Inhalation 4 milliLiter(s) Inhalation every 6 hours PRN  albuterol/ipratropium for Nebulization 3 milliLiter(s) Nebulizer every 6 hours PRN  sodium chloride 3%  Inhalation 4 milliLiter(s) Inhalation every 6 hours PRN    Antimicrobials:    Anticoagulants:  enoxaparin Injectable 75 milliGRAM(s) SubCutaneous every 12 hours    Cardiac:  metoprolol tartrate 25 milliGRAM(s) Oral every 12 hours  sacubitril 24 mG/valsartan 26 mG 1 Tablet(s) Oral every 12 hours      Allergies    amoxicillin (Rash)    Intolerances        Vital Signs Last 24 Hrs  T(C): 36.9 (20 Mar 2023 17:59), Max: 36.9 (20 Mar 2023 17:59)  T(F): 98.4 (20 Mar 2023 17:59), Max: 98.4 (20 Mar 2023 17:59)  HR: 68 (20 Mar 2023 17:24) (56 - 72)  BP: 124/71 (20 Mar 2023 17:24) (100/59 - 124/71)  BP(mean): 92 (20 Mar 2023 17:24) (73 - 92)  RR: 18 (20 Mar 2023 17:24) (17 - 18)  SpO2: 97% (20 Mar 2023 17:24) (94% - 99%)    Parameters below as of 20 Mar 2023 17:24  Patient On (Oxygen Delivery Method): room air        03-19 @ 07:01  -  03-20 @ 07:00  --------------------------------------------------------  IN: 950 mL / OUT: 2100 mL / NET: -1150 mL    03-20 @ 07:01 - 03-20 @ 20:09  --------------------------------------------------------  IN: 480 mL / OUT: 850 mL / NET: -370 mL          Review of Systems:   •	General: negative  •	Skin/Breast: negative  •	Ophthalmologic: negative  •	ENMT: negative  •	Respiratory and Thorax: negative  •	Cardiovascular: negative  •	Gastrointestinal: negative  •	Genitourinary: negative  •	Musculoskeletal: negative  •	Neurological: negative  •	Psychiatric: negative  •	Hematology/Lymphatics: negative  •	Endocrine: negative  •	Allergic/Immunologic: negative    Physical Exam:   • Constitutional:	refer to the dietion /Nutritionist note  • Eyes:	EOMI; PERRL; no drainage or redness  • ENMT:	No oral lesions; no gross abnormalities  • Neck	No bruits; no thyromegaly or nodules  • Breasts:	not examined  • Back:	No deformity or limitation of movement  • Respiratory:	Breath Sounds equal & clear to percussion & auscultation, no accessory muscle use  • Cardiovascular:	Regular rate & rhythm, normal S1, S2; no murmurs, gallops or rubs; no S3, S4  • Gastrointestinal:	Soft, non-tender, no hepatosplenomegaly, normal bowel sounds  • Genitourinary:	not examined  • Rectal: not examined  • Extremities:	No cyanosis, clubbing or edema  • Vascular:	Equal and normal pulses (carotid, femoral, dorsalis pedis)  • Neurologica:l	not examined  • Skin:	No lesions; no rash  • Lymph Nodes:	No lymphadedenopathy  • Musculoskeletal:	No joint pain, swelling or deformity; no limitation of movement        LABS:      CBC Full  -  ( 20 Mar 2023 05:30 )  WBC Count : 4.38 K/uL  RBC Count : 2.78 M/uL  Hemoglobin : 9.3 g/dL  Hematocrit : 27.2 %  Platelet Count - Automated : 123 K/uL  Mean Cell Volume : 97.8 fl  Mean Cell Hemoglobin : 33.5 pg  Mean Cell Hemoglobin Concentration : 34.2 gm/dL  Auto Neutrophil # : x  Auto Lymphocyte # : x  Auto Monocyte # : x  Auto Eosinophil # : x  Auto Basophil # : x  Auto Neutrophil % : x  Auto Lymphocyte % : x  Auto Monocyte % : x  Auto Eosinophil % : x  Auto Basophil % : x    03-20    125<L>  |  95<L>  |  8   ----------------------------<  133<H>  4.3   |  24  |  0.65    Ca    7.9<L>      20 Mar 2023 05:30  Phos  2.1     03-20  Mg     1.8     03-20    TPro  6.0  /  Alb  3.2<L>  /  TBili  0.5  /  DBili  x   /  AST  32  /  ALT  49<H>  /  AlkPhos  81  03-19                        RADIOLOGY & ADDITIONAL STUDIES (The following images were personally reviewed):

## 2023-03-20 NOTE — PROGRESS NOTE ADULT - ATTENDING COMMENTS
acute LV dysfunction  plan on repeating echocardiogram  if EF still reduced will do CCTA to define anatomy risk of lowering seizure threshold with lexiscan  continue entresto 24/26 bid  change metoprolol tartrate to succinate

## 2023-03-20 NOTE — CHART NOTE - NSCHARTNOTEFT_GEN_A_CORE
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      Type 2 diabetes mellitus      Hyperlipidemia      Iron deficiency anemia      Nephrolithiasis      Thrombocytopenia      Hyponatremia      BPH (benign prostatic hyperplasia)      S/P craniotomy      H/O tracheostomy          Current Nutrition Order:       PO Intake: Good (%) [   ]  Fair (50-75%) [   ] Poor (<25%) [   ]    GI Issues:     Pain:    Skin Integrity:    Labs:   03-20    125<L>  |  95<L>  |  8   ----------------------------<  133<H>  4.3   |  24  |  0.65    Ca    7.9<L>      20 Mar 2023 05:30  Phos  2.1     03-20  Mg     1.8     03-20    TPro  6.0  /  Alb  3.2<L>  /  TBili  0.5  /  DBili  x   /  AST  32  /  ALT  49<H>  /  AlkPhos  81  03-19    CAPILLARY BLOOD GLUCOSE      POCT Blood Glucose.: 133 mg/dL (20 Mar 2023 07:56)  POCT Blood Glucose.: 123 mg/dL (20 Mar 2023 05:49)  POCT Blood Glucose.: 109 mg/dL (20 Mar 2023 01:08)  POCT Blood Glucose.: 143 mg/dL (19 Mar 2023 21:52)  POCT Blood Glucose.: 93 mg/dL (19 Mar 2023 17:37)  POCT Blood Glucose.: 131 mg/dL (19 Mar 2023 13:01)  POCT Blood Glucose.: 197 mg/dL (19 Mar 2023 11:17)      Medications:  MEDICATIONS  (STANDING):  atorvastatin 20 milliGRAM(s) Oral at bedtime  enoxaparin Injectable 75 milliGRAM(s) SubCutaneous every 12 hours  escitalopram 5 milliGRAM(s) Oral daily  fluticasone propionate 50 MICROgram(s)/spray Nasal Spray 1 Spray(s) Both Nostrils two times a day  insulin glargine Injectable (LANTUS) 5 Unit(s) SubCutaneous at bedtime  insulin lispro Injectable (ADMELOG) 4 Unit(s) SubCutaneous three times a day before meals  insulin regular  human corrective regimen sliding scale   SubCutaneous every 6 hours  levETIRAcetam 750 milliGRAM(s) Oral two times a day  melatonin 5 milliGRAM(s) Oral at bedtime  metoprolol tartrate 25 milliGRAM(s) Oral every 12 hours  potassium phosphate / sodium phosphate Powder (PHOS-NaK) 1 Packet(s) Oral every 6 hours  sacubitril 24 mG/valsartan 26 mG 1 Tablet(s) Oral every 12 hours  sodium chloride 2 Gram(s) Oral every 6 hours    MEDICATIONS  (PRN):  acetaminophen     Tablet .. 650 milliGRAM(s) Oral every 6 hours PRN Temp greater or equal to 38C (100.4F), Mild Pain (1 - 3)  acetylcysteine 20%  Inhalation 4 milliLiter(s) Inhalation every 6 hours PRN secretions  albuterol/ipratropium for Nebulization 3 milliLiter(s) Nebulizer every 6 hours PRN Shortness of Breath and/or Wheezing  benzocaine/menthol Lozenge 1 Lozenge Oral daily PRN Sore Throat  ondansetron Injectable 4 milliGRAM(s) IV Push every 8 hours PRN Nausea and/or Vomiting  sodium chloride 3%  Inhalation 4 milliLiter(s) Inhalation every 6 hours PRN secretions      Weight:  Daily     Daily     Weight Change:     Nutrition Focused Physical Exam: Completed [   ]  Not Pertinent [   ]  Muscle Wasting- Temporal [   ]  Clavicle/Pectoral [   ]  Shoulder/Deltoid [   ]  Scapula [   ]  Interosseous [   ]  Quadriceps [   ]  Gastrocnemius [   ]  Fat Wasting- Orbital [   ]  Buccal [   ]  Triceps [   ]  Rib [   ]  Suspect [PCM] 2/2 to physical assessment, [poor intake], and [wt loss]; please see malnutrition chart note.    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  Type 2 diabetes mellitus  Hyperlipidemia  Iron deficiency anemia  Nephrolithiasis  Thrombocytopenia  Hyponatremia  BPH (benign prostatic hyperplasia)  S/P craniotomy  H/O tracheostomy    Current Nutrition Order:  CST CHO diet with evening snack     PO Intake: Good (%) [   ]  Fair (50-75%) [ x  ] Poor (<25%) [   ]- please see kcal count results below    GI Issues:   WDL, no n/v/d/c  No abd distention or discomfort    Pain:  No pain noted at this time    Skin Integrity:  Surgical incision, diana score 21  No edema noted  No pressure ulcers noted    Labs:   03-20    125<L>  |  95<L>  |  8   ----------------------------<  133<H>  4.3   |  24  |  0.65    Ca    7.9<L>      20 Mar 2023 05:30  Phos  2.1     03-20  Mg     1.8     03-20    TPro  6.0  /  Alb  3.2<L>  /  TBili  0.5  /  DBili  x   /  AST  32  /  ALT  49<H>  /  AlkPhos  81  03-19    CAPILLARY BLOOD GLUCOSE  POCT Blood Glucose.: 133 mg/dL (20 Mar 2023 07:56)  POCT Blood Glucose.: 123 mg/dL (20 Mar 2023 05:49)  POCT Blood Glucose.: 109 mg/dL (20 Mar 2023 01:08)  POCT Blood Glucose.: 143 mg/dL (19 Mar 2023 21:52)  POCT Blood Glucose.: 93 mg/dL (19 Mar 2023 17:37)  POCT Blood Glucose.: 131 mg/dL (19 Mar 2023 13:01)  POCT Blood Glucose.: 197 mg/dL (19 Mar 2023 11:17)    Medications:  MEDICATIONS  (STANDING):  atorvastatin 20 milliGRAM(s) Oral at bedtime  enoxaparin Injectable 75 milliGRAM(s) SubCutaneous every 12 hours  escitalopram 5 milliGRAM(s) Oral daily  fluticasone propionate 50 MICROgram(s)/spray Nasal Spray 1 Spray(s) Both Nostrils two times a day  insulin glargine Injectable (LANTUS) 5 Unit(s) SubCutaneous at bedtime  insulin lispro Injectable (ADMELOG) 4 Unit(s) SubCutaneous three times a day before meals  insulin regular  human corrective regimen sliding scale   SubCutaneous every 6 hours  levETIRAcetam 750 milliGRAM(s) Oral two times a day  melatonin 5 milliGRAM(s) Oral at bedtime  metoprolol tartrate 25 milliGRAM(s) Oral every 12 hours  potassium phosphate / sodium phosphate Powder (PHOS-NaK) 1 Packet(s) Oral every 6 hours  sacubitril 24 mG/valsartan 26 mG 1 Tablet(s) Oral every 12 hours  sodium chloride 2 Gram(s) Oral every 6 hours    MEDICATIONS  (PRN):  acetaminophen     Tablet .. 650 milliGRAM(s) Oral every 6 hours PRN Temp greater or equal to 38C (100.4F), Mild Pain (1 - 3)  acetylcysteine 20%  Inhalation 4 milliLiter(s) Inhalation every 6 hours PRN secretions  albuterol/ipratropium for Nebulization 3 milliLiter(s) Nebulizer every 6 hours PRN Shortness of Breath and/or Wheezing  benzocaine/menthol Lozenge 1 Lozenge Oral daily PRN Sore Throat  ondansetron Injectable 4 milliGRAM(s) IV Push every 8 hours PRN Nausea and/or Vomiting  sodium chloride 3%  Inhalation 4 milliLiter(s) Inhalation every 6 hours PRN secretions    Admitting Anthropometrics:  Height for BMI (FEET)	5 Feet  Height for BMI (INCHES)	7 Inch(s)  Height for BMI (CENTIMETERS)	170.18 Centimeter(s)  Weight for BMI (lbs)	157 lb  Weight for BMI (kg)	71.2 kg  Body Mass Index	24.5     Weight Change:   No new weights obtained during this admission. please cont to trend weights when feasible    Nutrition Focused Physical Exam: Completed [   ]  Not Pertinent [ x  ]    Estimated energy needs:   IBW used for calculations as pt >100% of IBW (106%). Needs adjusted for critical care requiring vent support, advanced age. Fluid per team.  Kcal (25-30 kcal/kg): 1652-9888 kcal  Protein (1.4-1.6 g/kg): 93-107g pro    Subjective:   69 y/o right handed male with pmhx of T2DM, HLD, JAGDISH, hyponatremia, tracheal stenosis s/p tracheostomy, and glioblastoma now s/p cerebral angiogram with Avastin treatment (3/7). c-diff sent for multiple loose BMs/febrile/off bowel regimen since 3/7- came back negative. S/p SLP eval 3/15. S/p follow up FEES 3/16 with recs for regular diet with thin liquids. NGT removed 3/19 and focused placed on PO.     On assessment, pt resting in bed on trach collar. Currently remains NPO with EN feeds- EN regimen was updated to prevent overfeeding. No n/v/c. Loose stools appear to have improved from previous assessment. No abd distention or discomfort noted. Education deferred at this time. Please see nutr recs below.    Previous Nutrition Diagnosis: Inadequate oral intake RT inability to meet est needs via PO AEB NPO, reliant on EN feeds to meet 100% of est needs    Active [   ]  Resolved [ x  ]    If resolved, new PES:     Goal:  Consistently meet >75% est needs via feasible route    Recommendations:  1. Cont with current EN regimen: Osmolite 1.2 @ 65 ml/hr x24hrs via NGT with x1 LPS to provide 1560 ml TV, 1972 kcal, 101 g pro, 1258 ml free water.   >> Recommend addition of Banatrol TID  >> FWF per team  >> Maintain aspiration precautions at all times  2. Pain and bowel regimen per team   3. Cont to monitor lytes and replete prn   4. RD diet edu prn    Education:   Deferred at this time    Risk Level: High [ x  ] Moderate [   ] Low [   ]. 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  Type 2 diabetes mellitus  Hyperlipidemia  Iron deficiency anemia  Nephrolithiasis  Thrombocytopenia  Hyponatremia  BPH (benign prostatic hyperplasia)  S/P craniotomy  H/O tracheostomy    Current Nutrition Order:  CST CHO diet with evening snack     PO Intake: Good (%) [   ]  Fair (50-75%) [ x  ] Poor (<25%) [   ]- please see kcal count results below    GI Issues:   WDL, no n/v/d/c  No abd distention or discomfort    Pain:  No pain noted at this time    Skin Integrity:  Surgical incision, diana score 21  No edema noted  No pressure ulcers noted    Labs:   03-20    125<L>  |  95<L>  |  8   ----------------------------<  133<H>  4.3   |  24  |  0.65    Ca    7.9<L>      20 Mar 2023 05:30  Phos  2.1     03-20  Mg     1.8     03-20    TPro  6.0  /  Alb  3.2<L>  /  TBili  0.5  /  DBili  x   /  AST  32  /  ALT  49<H>  /  AlkPhos  81  03-19    CAPILLARY BLOOD GLUCOSE  POCT Blood Glucose.: 133 mg/dL (20 Mar 2023 07:56)  POCT Blood Glucose.: 123 mg/dL (20 Mar 2023 05:49)  POCT Blood Glucose.: 109 mg/dL (20 Mar 2023 01:08)  POCT Blood Glucose.: 143 mg/dL (19 Mar 2023 21:52)  POCT Blood Glucose.: 93 mg/dL (19 Mar 2023 17:37)  POCT Blood Glucose.: 131 mg/dL (19 Mar 2023 13:01)  POCT Blood Glucose.: 197 mg/dL (19 Mar 2023 11:17)    Medications:  MEDICATIONS  (STANDING):  atorvastatin 20 milliGRAM(s) Oral at bedtime  enoxaparin Injectable 75 milliGRAM(s) SubCutaneous every 12 hours  escitalopram 5 milliGRAM(s) Oral daily  fluticasone propionate 50 MICROgram(s)/spray Nasal Spray 1 Spray(s) Both Nostrils two times a day  insulin glargine Injectable (LANTUS) 5 Unit(s) SubCutaneous at bedtime  insulin lispro Injectable (ADMELOG) 4 Unit(s) SubCutaneous three times a day before meals  insulin regular  human corrective regimen sliding scale   SubCutaneous every 6 hours  levETIRAcetam 750 milliGRAM(s) Oral two times a day  melatonin 5 milliGRAM(s) Oral at bedtime  metoprolol tartrate 25 milliGRAM(s) Oral every 12 hours  potassium phosphate / sodium phosphate Powder (PHOS-NaK) 1 Packet(s) Oral every 6 hours  sacubitril 24 mG/valsartan 26 mG 1 Tablet(s) Oral every 12 hours  sodium chloride 2 Gram(s) Oral every 6 hours    MEDICATIONS  (PRN):  acetaminophen     Tablet .. 650 milliGRAM(s) Oral every 6 hours PRN Temp greater or equal to 38C (100.4F), Mild Pain (1 - 3)  acetylcysteine 20%  Inhalation 4 milliLiter(s) Inhalation every 6 hours PRN secretions  albuterol/ipratropium for Nebulization 3 milliLiter(s) Nebulizer every 6 hours PRN Shortness of Breath and/or Wheezing  benzocaine/menthol Lozenge 1 Lozenge Oral daily PRN Sore Throat  ondansetron Injectable 4 milliGRAM(s) IV Push every 8 hours PRN Nausea and/or Vomiting  sodium chloride 3%  Inhalation 4 milliLiter(s) Inhalation every 6 hours PRN secretions    Admitting Anthropometrics:  Height for BMI (FEET)	5 Feet  Height for BMI (INCHES)	7 Inch(s)  Height for BMI (CENTIMETERS)	170.18 Centimeter(s)  Weight for BMI (lbs)	157 lb  Weight for BMI (kg)	71.2 kg  Body Mass Index	24.5     Weight Change:   No new weights obtained during this admission. please cont to trend weights when feasible    Nutrition Focused Physical Exam: Completed [   ]  Not Pertinent [ x  ]    Estimated energy needs:   IBW used for calculations as pt >100% of IBW (106%). Needs adjusted for critical care requiring vent support, advanced age. Fluid per team.  Kcal (25-30 kcal/kg): 1042-6866 kcal  Protein (1.4-1.6 g/kg): 93-107g pro    Subjective:   67 y/o right handed male with pmhx of T2DM, HLD, JAGDISH, hyponatremia, tracheal stenosis s/p tracheostomy, and glioblastoma now s/p cerebral angiogram with Avastin treatment (3/7). c-diff sent for multiple loose BMs/febrile/off bowel regimen since 3/7- came back negative. S/p SLP eval 3/15. S/p follow up FEES 3/16 with recs for regular diet with thin liquids. NGT removed 3/19 and focused placed on PO.         On assessment, pt resting in bed on trach collar. Currently remains NPO with EN feeds- EN regimen was updated to prevent overfeeding. No n/v/c. Loose stools appear to have improved from previous assessment. No abd distention or discomfort noted. Education deferred at this time. Please see nutr recs below.    Previous Nutrition Diagnosis: Inadequate oral intake RT inability to meet est needs via PO AEB NPO, reliant on EN feeds to meet 100% of est needs    Active [   ]  Resolved [ x  ]    If resolved, new PES:     Goal:  Consistently meet >75% est needs via feasible route    Recommendations:  1. Cont with current EN regimen: Osmolite 1.2 @ 65 ml/hr x24hrs via NGT with x1 LPS to provide 1560 ml TV, 1972 kcal, 101 g pro, 1258 ml free water.   >> Recommend addition of Banatrol TID  >> FWF per team  >> Maintain aspiration precautions at all times  2. Pain and bowel regimen per team   3. Cont to monitor lytes and replete prn   4. RD diet edu prn    Education:   Deferred at this time    Risk Level: High [ x  ] Moderate [   ] Low [   ]. 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  Type 2 diabetes mellitus  Hyperlipidemia  Iron deficiency anemia  Nephrolithiasis  Thrombocytopenia  Hyponatremia  BPH (benign prostatic hyperplasia)  S/P craniotomy  H/O tracheostomy    Current Nutrition Order:  CST CHO diet with evening snack     PO Intake: Good (%) [   ]  Fair (50-75%) [ x  ] Poor (<25%) [   ]- please see kcal count results below    GI Issues:   WDL, no n/v/d/c  No abd distention or discomfort    Pain:  No pain noted at this time    Skin Integrity:  Surgical incision, diana score 21  No edema noted  No pressure ulcers noted    Labs:   03-20    125<L>  |  95<L>  |  8   ----------------------------<  133<H>  4.3   |  24  |  0.65    Ca    7.9<L>      20 Mar 2023 05:30  Phos  2.1     03-20  Mg     1.8     03-20    TPro  6.0  /  Alb  3.2<L>  /  TBili  0.5  /  DBili  x   /  AST  32  /  ALT  49<H>  /  AlkPhos  81  03-19    CAPILLARY BLOOD GLUCOSE  POCT Blood Glucose.: 133 mg/dL (20 Mar 2023 07:56)  POCT Blood Glucose.: 123 mg/dL (20 Mar 2023 05:49)  POCT Blood Glucose.: 109 mg/dL (20 Mar 2023 01:08)  POCT Blood Glucose.: 143 mg/dL (19 Mar 2023 21:52)  POCT Blood Glucose.: 93 mg/dL (19 Mar 2023 17:37)  POCT Blood Glucose.: 131 mg/dL (19 Mar 2023 13:01)  POCT Blood Glucose.: 197 mg/dL (19 Mar 2023 11:17)    Medications:  MEDICATIONS  (STANDING):  atorvastatin 20 milliGRAM(s) Oral at bedtime  enoxaparin Injectable 75 milliGRAM(s) SubCutaneous every 12 hours  escitalopram 5 milliGRAM(s) Oral daily  fluticasone propionate 50 MICROgram(s)/spray Nasal Spray 1 Spray(s) Both Nostrils two times a day  insulin glargine Injectable (LANTUS) 5 Unit(s) SubCutaneous at bedtime  insulin lispro Injectable (ADMELOG) 4 Unit(s) SubCutaneous three times a day before meals  insulin regular  human corrective regimen sliding scale   SubCutaneous every 6 hours  levETIRAcetam 750 milliGRAM(s) Oral two times a day  melatonin 5 milliGRAM(s) Oral at bedtime  metoprolol tartrate 25 milliGRAM(s) Oral every 12 hours  potassium phosphate / sodium phosphate Powder (PHOS-NaK) 1 Packet(s) Oral every 6 hours  sacubitril 24 mG/valsartan 26 mG 1 Tablet(s) Oral every 12 hours  sodium chloride 2 Gram(s) Oral every 6 hours    MEDICATIONS  (PRN):  acetaminophen     Tablet .. 650 milliGRAM(s) Oral every 6 hours PRN Temp greater or equal to 38C (100.4F), Mild Pain (1 - 3)  acetylcysteine 20%  Inhalation 4 milliLiter(s) Inhalation every 6 hours PRN secretions  albuterol/ipratropium for Nebulization 3 milliLiter(s) Nebulizer every 6 hours PRN Shortness of Breath and/or Wheezing  benzocaine/menthol Lozenge 1 Lozenge Oral daily PRN Sore Throat  ondansetron Injectable 4 milliGRAM(s) IV Push every 8 hours PRN Nausea and/or Vomiting  sodium chloride 3%  Inhalation 4 milliLiter(s) Inhalation every 6 hours PRN secretions    Admitting Anthropometrics:  Height for BMI (FEET)	5 Feet  Height for BMI (INCHES)	7 Inch(s)  Height for BMI (CENTIMETERS)	170.18 Centimeter(s)  Weight for BMI (lbs)	157 lb  Weight for BMI (kg)	71.2 kg  Body Mass Index	24.5     Weight Change:   No new weights obtained during this admission. please cont to trend weights when feasible    Nutrition Focused Physical Exam: Completed [   ]  Not Pertinent [ x  ]    Estimated energy needs:   IBW used for calculations as pt >100% of IBW (106%). Needs adjusted for critical care requiring vent support, advanced age. Fluid per team.  Kcal (25-30 kcal/kg): 9842-3709 kcal  Protein (1.4-1.6 g/kg): 93-107g pro    Subjective:   69 y/o right handed male with pmhx of T2DM, HLD, JAGDISH, hyponatremia, tracheal stenosis s/p tracheostomy, and glioblastoma now s/p cerebral angiogram with Avastin treatment (3/7). c-diff sent for multiple loose BMs/febrile/off bowel regimen since 3/7- came back negative. S/p SLP eval 3/15. S/p follow up FEES 3/16 with recs for regular diet with thin liquids. S/p follow up SLP eval 3/19 with recs for regular diet with thin liquids- NGT removed following assessment with focused placed on PO.     On assessment, pt resting in bed on trach collar. Currently on CST CHO diet with evening snack tolerating PO well- please see kcal count results below. No n/v/d/c. Last BM 3/19. +slight abd distention. Education deferred at this time. Please see nutr recs below.    3 Day Calorie Count  (3/17-3/19): Only 2 out of 3 sheets completed. Please see results below  Day 1: kcal,  g pro  Meeting % estimated energy and % estimated protein needs  Day 2: kcal,  g pro  Meeting % estimated energy and % estimated protein needs    Previous Nutrition Diagnosis: Inadequate oral intake RT inability to meet est needs via PO AEB NPO, reliant on EN feeds to meet 100% of est needs    Active [   ]  Resolved [ x  ]    If resolved, new PES:     Goal:  Consistently meet >75% est needs via feasible route    Recommendations:  1. Cont with CST CHO diet with evening snack  >> Monitor need for addition of ONS   2. Pain and bowel regimen per team   3. Cont to monitor lytes and replete prn   4. RD diet edu prn    Education:   Deferred at this time    Risk Level: High [ x  ] Moderate [   ] Low [   ]. 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  Type 2 diabetes mellitus  Hyperlipidemia  Iron deficiency anemia  Nephrolithiasis  Thrombocytopenia  Hyponatremia  BPH (benign prostatic hyperplasia)  S/P craniotomy  H/O tracheostomy    Current Nutrition Order:  CST CHO diet with evening snack     PO Intake: Good (%) [   ]  Fair (50-75%) [ x  ] Poor (<25%) [   ]- please see kcal count results below    GI Issues:   WDL, no n/v/d/c  No abd distention or discomfort    Pain:  No pain noted at this time    Skin Integrity:  Surgical incision, diana score 21  No edema noted  No pressure ulcers noted    Labs:   03-20    125<L>  |  95<L>  |  8   ----------------------------<  133<H>  4.3   |  24  |  0.65    Ca    7.9<L>      20 Mar 2023 05:30  Phos  2.1     03-20  Mg     1.8     03-20    TPro  6.0  /  Alb  3.2<L>  /  TBili  0.5  /  DBili  x   /  AST  32  /  ALT  49<H>  /  AlkPhos  81  03-19    CAPILLARY BLOOD GLUCOSE  POCT Blood Glucose.: 133 mg/dL (20 Mar 2023 07:56)  POCT Blood Glucose.: 123 mg/dL (20 Mar 2023 05:49)  POCT Blood Glucose.: 109 mg/dL (20 Mar 2023 01:08)  POCT Blood Glucose.: 143 mg/dL (19 Mar 2023 21:52)  POCT Blood Glucose.: 93 mg/dL (19 Mar 2023 17:37)  POCT Blood Glucose.: 131 mg/dL (19 Mar 2023 13:01)  POCT Blood Glucose.: 197 mg/dL (19 Mar 2023 11:17)    Medications:  MEDICATIONS  (STANDING):  atorvastatin 20 milliGRAM(s) Oral at bedtime  enoxaparin Injectable 75 milliGRAM(s) SubCutaneous every 12 hours  escitalopram 5 milliGRAM(s) Oral daily  fluticasone propionate 50 MICROgram(s)/spray Nasal Spray 1 Spray(s) Both Nostrils two times a day  insulin glargine Injectable (LANTUS) 5 Unit(s) SubCutaneous at bedtime  insulin lispro Injectable (ADMELOG) 4 Unit(s) SubCutaneous three times a day before meals  insulin regular  human corrective regimen sliding scale   SubCutaneous every 6 hours  levETIRAcetam 750 milliGRAM(s) Oral two times a day  melatonin 5 milliGRAM(s) Oral at bedtime  metoprolol tartrate 25 milliGRAM(s) Oral every 12 hours  potassium phosphate / sodium phosphate Powder (PHOS-NaK) 1 Packet(s) Oral every 6 hours  sacubitril 24 mG/valsartan 26 mG 1 Tablet(s) Oral every 12 hours  sodium chloride 2 Gram(s) Oral every 6 hours    MEDICATIONS  (PRN):  acetaminophen     Tablet .. 650 milliGRAM(s) Oral every 6 hours PRN Temp greater or equal to 38C (100.4F), Mild Pain (1 - 3)  acetylcysteine 20%  Inhalation 4 milliLiter(s) Inhalation every 6 hours PRN secretions  albuterol/ipratropium for Nebulization 3 milliLiter(s) Nebulizer every 6 hours PRN Shortness of Breath and/or Wheezing  benzocaine/menthol Lozenge 1 Lozenge Oral daily PRN Sore Throat  ondansetron Injectable 4 milliGRAM(s) IV Push every 8 hours PRN Nausea and/or Vomiting  sodium chloride 3%  Inhalation 4 milliLiter(s) Inhalation every 6 hours PRN secretions    Admitting Anthropometrics:  Height for BMI (FEET)	5 Feet  Height for BMI (INCHES)	7 Inch(s)  Height for BMI (CENTIMETERS)	170.18 Centimeter(s)  Weight for BMI (lbs)	157 lb  Weight for BMI (kg)	71.2 kg  Body Mass Index	24.5     Weight Change:   No new weights obtained during this admission. please cont to trend weights when feasible    Nutrition Focused Physical Exam: Completed [   ]  Not Pertinent [ x  ]    Estimated energy needs:   ABW used for calculations as pt between % of IBW. (107%). Needs adjusted for advanced age  Kcal (25 kcal/kg): 7724-3627 kcal  Protein (1.4-1.6 g/kg): 71-85g pro    Subjective:   67 y/o right handed male with pmhx of T2DM, HLD, JAGDISH, hyponatremia, tracheal stenosis s/p tracheostomy, and glioblastoma now s/p cerebral angiogram with Avastin treatment (3/7). c-diff sent for multiple loose BMs/febrile/off bowel regimen since 3/7- came back negative. S/p SLP eval 3/15. S/p follow up FEES 3/16 with recs for regular diet with thin liquids. S/p follow up SLP eval 3/19 with recs for regular diet with thin liquids- NGT removed following assessment with focused placed on PO.     On assessment, pt resting in bed on trach collar. Currently on CST CHO diet with evening snack tolerating PO well- please see kcal count results below. No n/v/d/c. Last BM 3/19. +slight abd distention. Education deferred at this time. Please see nutr recs below.    3 Day Calorie Count  (3/17-3/19): Only 2 out of 3 sheets completed. Please see results below  Day 1: 332 kcal, 22g pro  Meeting 23.4% estimated energy and 31% estimated protein needs  Day 2: 909 kcal, 50.6g pro  Meeting 64% estimated energy and 71% estimated protein needs  **Appetite noted to improve from day 1 of recorded kcal count and day 2. Cont to closely monitor.     Previous Nutrition Diagnosis: Inadequate oral intake RT inability to meet est needs via PO AEB NPO, reliant on EN feeds to meet 100% of est needs    Active [   ]  Resolved [ x  ]    If resolved, new PES:     Goal:  Consistently meet >75% est needs via feasible route    Recommendations:  1. Cont with CST CHO diet with evening snack  >> Monitor need for addition of ONS   2. Pain and bowel regimen per team   3. Cont to monitor lytes and replete prn   4. RD diet edu prn    Education:   Deferred at this time    Risk Level: High [ x  ] Moderate [   ] Low [   ].

## 2023-03-20 NOTE — PROGRESS NOTE ADULT - ASSESSMENT
per Neurosurgery    68 y o right handed male with pmhx of T2DM, HLD, JAGDISH, hyponatremia, tracheal stenosis s/p tracheostomy, and glioblastoma now s/p cerebral angiogram with Avastin treatment (3/7/23). c/b cardiogenic shock, cardiomyopathy, improving.     PLAN:  Neuro   - Vitals/neuro q4   - CTA/CTP/repeat CTH negative   - Pain control   - Cont home Keppra 750 BID   - MRI w/wo pending  - continue home med: Continue Lexapro 5 mg daily     Cards  - SBP    - TTE EF 15-20%, akinetic apex and midsegments suggestive of Takotsubo  - Repeat echo 3/13, EF 20% needs repeat echo on 3/20 per cardiology   - Per cards: Lopressor 25 BID; Entresto 24/26 BID started 3/17  - s/p amiodarone for SVT D/c'ed 3/17 per cards, likely was 2/2 to sepsis     Pulm  - + Trach collar, tolerating passy hai valve   - Duonebs, mucomyst, saline nebs for secretions prn   - Pulm following, signed off   - trach exchanged to 6 shiley cuffless 3/16 by ENT    GI  - Consistent carb diet  - Bowel regimen held for loose stool, + banatrol, last BM 3/17    RENAL:   - Chronic hyponatremia   - NaCl tabs 2q6  - Voiding     HEME:  - DVT ppx: SQL 75 bid, SCD L side only  - Repeat anti-xa 0.56 on 3/17  - LE dopplers + R IM calf DVT, 3/14 chronic R IM calf DVT  - Heme consulted for thrombocytopenia - s/p iron x 3 days    ID:   - Last panculture 3/12, Sputum psudomonas + staph aureus, +serratia marcesans   - Cefepime 2 g q 8 (3/12 - 3/18) x 7 d   - C.diff negative      ENDO:   - DKA resolved    - DM: A1C 5.8, low dose ISS   - Lantus 5 U at bedtime, lispro 4u premeal per endo    DISPO:   - stepdown status, full code, dispo pending

## 2023-03-20 NOTE — PROGRESS NOTE ADULT - SUBJECTIVE AND OBJECTIVE BOX
Physical Medicine and Rehabilitation Progress Note :       Patient is a 68y old  Male who presents with a chief complaint of cerebral angiogram with Avastin (20 Mar 2023 11:55)      HPI:  67 y/o right handed male with pmhx of T2DM, HLD, JAGDISH, hyponatremia, tracheal stenosis s/p tracheostomy, and glioblastoma presents for cerebral angiogram with Avastin treatment.    Daughter states in January 2022, patient had onset of confusion, dizziness, and speech difficulty while in [place] and was diagnosed with brain mass. Pt evaluated upon return [place] the [place] at [facility] ED with CT head revealing left frontal brain mass. Pt underwent resection of left frontal enhancing tumor with GLEOLAN placed 1/27/2022. Path showed gliosarcoma, WHO grade IV, IDH wild-type, EGFR non amplified, MGMT promotor methylated. Pt was discharged to rehab and underwent radiation treatment and Temodar chemotherapy (completed 12/22). MRI 12/22 showed recurrence of brain mass and pt presents today referred by Dr. Mackenzie for trial participation cerebral angiogram with Avastin treatment.    Pt complains of dizziness. Denies headache, nausea, vomiting, weakness, numbness, chest pain, dyspnea.   (07 Mar 2023 12:30)                            9.3    4.38  )-----------( 123      ( 20 Mar 2023 05:30 )             27.2       03-20    125<L>  |  95<L>  |  8   ----------------------------<  133<H>  4.3   |  24  |  0.65    Ca    7.9<L>      20 Mar 2023 05:30  Phos  2.1     03-20  Mg     1.8     03-20    TPro  6.0  /  Alb  3.2<L>  /  TBili  0.5  /  DBili  x   /  AST  32  /  ALT  49<H>  /  AlkPhos  81  03-19    Vital Signs Last 24 Hrs  T(C): 36.7 (20 Mar 2023 09:13), Max: 36.8 (20 Mar 2023 04:33)  T(F): 98.1 (20 Mar 2023 09:13), Max: 98.2 (20 Mar 2023 04:33)  HR: 72 (20 Mar 2023 13:05) (56 - 72)  BP: 117/56 (20 Mar 2023 13:05) (100/59 - 117/56)  BP(mean): 80 (20 Mar 2023 13:05) (73 - 85)  RR: 17 (20 Mar 2023 11:41) (17 - 18)  SpO2: 95% (20 Mar 2023 13:05) (94% - 99%)    Parameters below as of 20 Mar 2023 13:05  Patient On (Oxygen Delivery Method): room air        MEDICATIONS  (STANDING):  atorvastatin 20 milliGRAM(s) Oral at bedtime  enoxaparin Injectable 75 milliGRAM(s) SubCutaneous every 12 hours  escitalopram 5 milliGRAM(s) Oral daily  fluticasone propionate 50 MICROgram(s)/spray Nasal Spray 1 Spray(s) Both Nostrils two times a day  insulin glargine Injectable (LANTUS) 5 Unit(s) SubCutaneous at bedtime  insulin lispro Injectable (ADMELOG) 4 Unit(s) SubCutaneous three times a day before meals  insulin regular  human corrective regimen sliding scale   SubCutaneous every 6 hours  levETIRAcetam 750 milliGRAM(s) Oral two times a day  melatonin 5 milliGRAM(s) Oral at bedtime  metoprolol tartrate 25 milliGRAM(s) Oral every 12 hours  potassium phosphate / sodium phosphate Powder (PHOS-NaK) 1 Packet(s) Oral every 6 hours  sacubitril 24 mG/valsartan 26 mG 1 Tablet(s) Oral every 12 hours  sodium chloride 2 Gram(s) Oral every 6 hours    MEDICATIONS  (PRN):  acetaminophen     Tablet .. 650 milliGRAM(s) Oral every 6 hours PRN Temp greater or equal to 38C (100.4F), Mild Pain (1 - 3)  acetylcysteine 20%  Inhalation 4 milliLiter(s) Inhalation every 6 hours PRN secretions  albuterol/ipratropium for Nebulization 3 milliLiter(s) Nebulizer every 6 hours PRN Shortness of Breath and/or Wheezing  benzocaine/menthol Lozenge 1 Lozenge Oral daily PRN Sore Throat  ondansetron Injectable 4 milliGRAM(s) IV Push every 8 hours PRN Nausea and/or Vomiting  sodium chloride 3%  Inhalation 4 milliLiter(s) Inhalation every 6 hours PRN secretions       Occupational Therapy Functional Status Assessment :       Safety      [facility] Functional Assessment: Daily Activity  Type of Assessment: Daily assessment  Putting on and taking off regular lower body clothing?: 3 = A little assistance  Bathing (including washing, rinsing, drying)?: 3 = A little assistance  Toileting, which includes using toilet, bedpan or urinal?: 3 = A little assistance  Putting on and taking off regular upper body clothing?: 3 = A little assistance  Take care of personal grooming such as brushing teeth?: 4 = No assist / stand by assistance  Eating meals?: 4 = No assist / stand by assistance  Score: 20   Row Comment: Ask the patient "How much help from another person do you currently need? (If the patient hasn't done an activity recently, how much help from another person do you think he/she needs if he/she tried?)    Cognitive/Neuro      Cognitive/Neuro/Behavioral  Cognitive/Neuro/Behavioral [WDL Definition: Alert; opens eyes spontaneously; arouses to voice or touch; oriented x 4; follows commands; speech spontaneous, logical; purposeful motor response; behavior appropriate to situation]: WDL except  Level of Consciousness: alert;  confused at times  Arousal Level: arouses to voice;  arouses to touch/gentle shaking;  opens eyes spontaneously  Orientation: oriented x 4  Speech: clear;  spontaneous  Mood/Behavior: calm;  cooperative    Language Assistance  Preferred Language to Address Healthcare Preferred Language to Address Healthcare: English    Therapeutic Interventions      Bed Mobility  Bed Mobility Training Rolling/Turning: contact guard;  nonverbal cues (demo/gestures);  verbal cues;  1 person assist  Bed Mobility Training Scooting: contact guard;  verbal cues;  nonverbal cues (demo/gestures);  1 person assist  Bed Mobility Training Sit-to-Supine: contact guard;  verbal cues;  1 person assist;  nonverbal cues (demo/gestures)  Bed Mobility Training Supine-to-Sit: contact guard;  1 person assist;  verbal cues;  nonverbal cues (demo/gestures)  Bed Mobility Training Limitations: decreased strength;  impaired coordination    Sit-Stand Transfer Training  Transfer Training Sit-to-Stand Transfer: contact guard;  verbal cues;  nonverbal cues (demo/gestures);  1 person assist  Transfer Training Stand-to-Sit Transfer: contact guard;  verbal cues;  nonverbal cues (demo/gestures);  1 person assist;  mod verbal cues for safety awareness  Sit-to-Stand Transfer Training Transfer Safety Analysis: decreased balance;  decreased cognition;  impaired coordination    Therapeutic Exercise  Therapeutic Exercise Detail: Pt performed functional mobility in hallway with L handheld assist and CGA, no overt loss of balance this session. Pt with slight shuffling gait noted, required mod verbal cues for increased step length.     Fine Motor Coordination Training  Fine Motor Coordination Training Detail: Pt engaged in snapping/unsnapping 4 snaps on gown. Pt with decreased visuoperceptual skills, difficulty telling the difference between the top/bottom snaps, increased time required to complete. Pt required min A to maintain proper orientation of snaps- due to difficulty problem solving and fine motor coordination.     Neuromuscular Re-education  Neuromuscular Re-education Detail: BUE opposition: RUE WNL, LUE with slight dysmetria and increased time to complete.     Lower Body Dressing Training  Lower Body Dressing Training Assistance: supervsion;  supervision;  verbal cues;  nonverbal cues (demo/gestures);  pull up socks seated EOB;  decreased strength;  impaired coordination;  impaired balance    Upper Body Dressing Training  Upper Body Dressing Training Assistance: contact guard;  verbal cues;  nonverbal cues (demo/gestures);  1 person assist;  decreased strength;  impaired coordination;  impaired balance    OT Cognitive Treatment  OT Treatment: Cognitive Charges: A&Ox3, pt continues to present with impulsivity and decreased safety awareness. Pt follows ~90% one step commands. Pt able to locate his room with no verbal cues.           PM&R Impression : as above    Current Disposition Plan Recommendations :    acute rehab placement

## 2023-03-20 NOTE — PROGRESS NOTE ADULT - ASSESSMENT
68M PMH T2DM, HLD, JAGDISH, hyponatremia, tracheal stenosis s/p tracheostomy, and glioblastoma s/p resection of the left frontal enhancing tumor admitted for cerebral angiogram with Avastin treatment in the setting of a recurrent mass c/b stress cardiomyopathy 2/2 excess norepinephrine.    Review of studies:  ECG: NSR, 1st degree AVB, 0.5mm- 1mm DAVID v1-v3, IVCD  TTE 3/2021: Normal Bi-V function  TTE with severely reduced LV systolic function EF 15-20%, apex and mid segments are akinetic  TTE 3/8/23: LVEF 15-20% . The entire apex and all the mid segments are akinetic, with relative sparing of the basal segments. In the absence of ischemic heart disease, this is suggestive of Takotsubo's cardiomyopathy. Clinical correlation is advised. With echocontrast imaging, there is no evidence of left ventricular thrombus. Normal right ventricular size and systolic function. Normal atria. Moderate tricuspid regurgitation. PASP 40 mmHg. No pericardial effusion. Mildly dilated aortic root.   TTE 3/13/23: EF is still reduced at 20%  JT 3/20/23: The left ventricle cavity size is normal. Left ventricular systolic function is moderately reduced with a calculated ejection fraction of 35-40% with regional wall motion abnormalities.     #Stress induced cardiomyopathy (Takotsubo cardiomyopathy)   Likely due to excess norepinephrine given relative good functional capacity, and normal TTE prior to the procedure.  Troponin peaked 0.32, no need to trend further. Off vasopressor support since 3/10. Briefly placed on neosynephrine during SVT episode, otherwise hemodynamically stable once in sinus rhythm.  -EF is still 20% - possibly still Takotsubo as this can take weeks to resolve.   - If EF does not improve, will need ischemic work-up when stable  - Give Lasix as needed  - JT with improved EF  - GDMT: c/w Entresto 24/26 mg bid, switch Metoprolol tartrate to Mertoprolol succinate 50 mg daily     #SVT/Atrial fibrillation  Telemetry 3/11/23: one episode of atrial fibrillation and SVT to 180s in the late morning - subsequent sinus rhythm s/p IV lopressor.  - BB as above   - s/p IV amiodarone load. d/c Amiodarone 400mg BID. Patient is rate and rhythm controlled. SVT could have been provoked by sepsis. No further events on tele

## 2023-03-20 NOTE — CHART NOTE - NSCHARTNOTEFT_GEN_A_CORE
POD13. ANA LILIA o/n neuro stable. Heme onc signed off for thrombocytopenia. TTE showing EF 37%. Pending postop MRI.

## 2023-03-21 NOTE — H&P ADULT - NS ATTEND AMEND GEN_ALL_CORE FT
I have personally seen and examined the patient with the NP. Medical records reviewed. I have made amendments to the documentation where necessary and adjusted the history, physical examination, and plan as documented by the NP.    69 yo male with complex medical history and left frontal GBM with multiple functional deficits  Admit to neuro- rehabilitation unit   Resume all medications  Admit labs  Medicine evaluation  Fall and seizure precautions I have personally seen and examined the patient with the NP. Medical records reviewed. I have made amendments to the documentation where necessary and adjusted the history, physical examination, and plan as documented by the NP.    67 yo male with complex medical history and left frontal GBM with multiple functional deficits  Admit to neuro- rehabilitation unit   Resume all medications  Admit labs - monitor platelets   Medicine evaluation  Pulmonary evaluation  Fall and seizure precautions

## 2023-03-21 NOTE — H&P ADULT - HISTORY OF PRESENT ILLNESS
This is a 69 YO right handed male with PMH of DM II, HLD, JAGDISH, hyponatremia, tracheal stenosis s/p tracheostomy, and glioblastoma who presented to [facility] on 3/7  for cerebral angiogram with Avastin treatment.    Per daughter, symptoms started in January 2022 when patient had onset of confusion, dizziness, and speech difficulty while in [place] and was diagnosed with brain mass. Pt evaluated upon return to the [place] at [facility] ED with CT head revealing left frontal brain mass. Pt underwent resection of left frontal enhancing tumor with GLEOLAN placed 1/27/2022. Pathology showed gliosarcoma, WHO grade IV, IDH wild-type, EGFR non amplified, MGMT promotor methylated. Pt was discharged to rehab and underwent radiation treatment and Temodar chemotherapy (completed 12/22). MRI 12/22 showed recurrence of brain mass.    The patient presented to [facility] on 3/7 referred by Dr. Mackenzie for trial participation cerebral angiogram with Avastin treatment. Post op in cath lab hypertensive, +flash pulmonary edema w/ desaturation, given diuretics. He developed new global aphasia and right sided weakness on 3/8. Hospital course further significant for metabolic acidosis, desaturation requiring vent support, PNA, stress induced cardiomyopathy (Takotsubo cardiomyopathy, reduced but stable EF at 20%), thrombocytopenia, chronic DVT. NGT removed on 3/19, PMV per s&s.    Patient was evaluated by PM&R and therapy for functional deficits, gait/ADL impairments and acute rehabilitation was recommended. Patient was medically optimized for discharge to [facility] IRU on 3/21/23.     This is a 67 YO right handed male with PMH of DM II, HLD, JAGDISH, hyponatremia, tracheal stenosis s/p tracheostomy, and glioblastoma who presented to [facility] on 3/7  for cerebral angiogram with Avastin treatment.    Per daughter, symptoms started in January 2022 when patient had onset of confusion, dizziness, and speech difficulty while in [place] and was diagnosed with brain mass. Pt evaluated upon return to the [place] at [facility] ED with CT head revealing left frontal brain mass. Pt underwent resection of left frontal enhancing tumor with GLEOLAN placed 1/27/2022. Pathology showed gliosarcoma, WHO grade IV, IDH wild-type, EGFR non amplified, MGMT promotor methylated. Pt was discharged to rehab and underwent radiation treatment and Temodar chemotherapy (completed 12/22). MRI 12/22 showed recurrence of brain mass.    The patient presented to [facility] on 3/7 referred by Dr. Mackenzie for trial participation cerebral angiogram with Avastin treatment. Post op in cath lab hypertensive, +flash pulmonary edema with desaturation, given diuretics. He developed new global aphasia and right sided weakness on 3/8. Hospital course further significant for metabolic acidosis, desaturation requiring vent support, PNA, stress induced cardiomyopathy (Takotsubo cardiomyopathy, reduced but stable EF at 20%), thrombocytopenia, chronic DVT. NGT placed and removed on 3/19, PMV per s&s.    Patient was evaluated by PM&R and therapy for functional deficits, gait/ADL impairments and acute rehabilitation was recommended. Patient was medically optimized for discharge to [facility] IRU on 3/21/23.

## 2023-03-21 NOTE — PROGRESS NOTE ADULT - SUBJECTIVE AND OBJECTIVE BOX
HPI:  69 y/o right handed male with pmhx of T2DM, HLD, JAGDISH, hyponatremia, tracheal stenosis s/p tracheostomy, and glioblastoma presents for cerebral angiogram with Avastin treatment.    Daughter states in January 2022, patient had onset of confusion, dizziness, and speech difficulty while in [place] and was diagnosed with brain mass. Pt evaluated upon return to the [place] at [facility] ED with CT head revealing left frontal brain mass. Pt underwent resection of left frontal enhancing tumor with GLEOLAN placed 1/27/2022. Path showed gliosarcoma, WHO grade IV, IDH wild-type, EGFR non amplified, MGMT promotor methylated. Pt was discharged to rehab and underwent radiation treatment and Temodar chemotherapy (completed 12/22). MRI 12/22 showed recurrence of brain mass and pt presents today referred by Dr. Mackenzie for trial participation cerebral angiogram with Avastin treatment.    Pt complains of dizziness. Denies headache, nausea, vomiting, weakness, numbness, chest pain, dyspnea.   (07 Mar 2023 12:30)      Subjective:  MRI completed. ANA LILIA o/n. Patient denies any acute complaints.     Hospital course:  3/7: POD0 cerebral angiogram IA avastin. post op in cath lab hypertensive, +flash pulmonary edema w/ desaturation, given diuretics. on cpap   3/8: POD1. o/n new global aphasia, right side w/d. CTH/CTA/CTP performed. ABG o/n with metabolic acidosis, lactate 7.9 and hyperglycemic 320s. started on insulin gtt, 1L NS bolus and NS@200cc/hr. desatting on cpap, placed on full vent support. propofol for sedation. in SVT, resolved with 5 lopresor. fluids stopped due to worsening CXR. repeat lactate 12, given 1amp bicarb then started on bicarb gtt. POCUS this am with hypokinetic left ventricle, pending echo. Cards/nephro/ endo consulted for further recs. Insulin gtt dc'd. VEEG monitoring. S/p 3%Na.  SQL started tonight. S/p 10 NPH after FS of 166. steven gtt. S/p 40 IV lasix. Troponin downtrending 0.32 to 0.18. EEG negative for seizures. Pancultured spiked 101.5. UA negative. Bicarb gtt dc'd. TTE EF15-20%, akinetic apex and midsegments suggestive of takotsubo  3/9: POD2. ANA LILIA o/n neuro stable. remains sedated on propofol, on levo gtt. Propofol dc'd. s/p lasix 40 mg IV x 1. CXR L effusion improved. Started lantus 10u at bedtime per endocrine. Campuzano removed, f/u TOV.    3/10: POD3 ANA LLIIA overnight. Neuro exam stable. multiple bouts of diarrhea overnight and AM. Tube feeds held. s/p albumin 250 cc +  cc for tachycardia in the setting of negative fluid status. Tolerating CPAP. Tolerating trach collar x 3 hours, switched back to CPAP overnight. Off levo gtt.   3/11: POD4 Episode of O2 desaturation to low 90s while on 40% FiO2. FiO2 increased to 60% briefly with improvement in O2, good breath sounds throughout, Pt denies SOB and CXR stable. Neuro exam stable. CXR with worsening congestion, given Lasix 40mg IV, on full vent support. Heme consulted for thrombocytopenia. Fever 101F. Episode of SVT with HR to 180s and hypotensive to 80s. Started on Steven, EKG and IV tylenol. Cards fellow called to bedside. Lopressor 5mg IV given with good resolution. Vital signs normalized. Patient remained neuro intact. Started on maintenance lopressor 12.5bid and amio gtt per cards. Given another Lasix 40mg IV at 6pm. Added regular insulin 2 units q 6 per endo.  3/12: POD5 Pt remains on full vent support. Neuro exam stable. Spiked 101.7F fever, pan cultured. Started on vanco/cefepine for empiric coverage. Lasix 40mg IV given in afternoon. Started on IV iron x 3 days, per heme. Regular insulin increased to 6q6, per endo. Amio PO, increased lopressor to 25bid, per cards.  3/13: POD 6. ANA LILIA overnight, exam stable, c-diff sent for multiple loose BMs/febrile/off bowel regimen since 3/7, negative. low grade fever overnight 100.2 pan cx yesterday for fever 101.7. C. diff negative. Cefepime x 7 d for psudomonal PNA. Repeat echo done, EF 20%. Osmolite changed to Vital per endo recs for loose stools.   3/14: POD7. ANA LILIA o/n neuro stable. started on full dose SQL for DVT. 20mg lasix given for diuresis. LE dopplers showing chronic R IM calf DVT. Tolerating trach collar. Restarted his home lexapro for agitation. Increased regular insulin to 6 units. Pocus w/ several b-lines given 40 mg lasix, repeat chest xray in am. Regular insulin held for sugar 89.   3/15: POD 8 IA avastin. Overnight Increased regular insulin to 6 units. Pocus w/ several b-lines given 40 mg lasix, repeat chest xray in am. Regular insulin held for sugar 89.   CXR this mornng with pulm congestion, given lasix 40mg IV. Speech/swallow pathologist evaluated today. Plan for FEES tomorrow. Per endo, 4 regular insulin given.  3/16: POD9, overnight dc'd salt tabs, anti-xa 0.4 will recheck after next 2 doses. ENT exchanged to 6 shiley cuffless. FEES done, able to tolerate regular diet with thin liquids. anti-10a low 0.43, increased lovenox to 75BID. Tube feeds held during the day, restarted at 9PM, fingesticks borderline low, lantus 5 units given, regular insulin held.  3/17: POD10, ANA LILIA overnight, amiodarone d/c'ed, monitor for arrhythmias/SVT. LFTs elevated this am, trend (recheck in 2 days). Pend MRI brain, repeat echo on 3/20. Pend anti-Xa this evening at 10pm. Pend possible discharge to rehab Mon 3/19. Entresto 24/26 BID started per cardiology recs. Endo rec lantus 5u and lispro 4u premeal. Antixa appropriate.   3/18: POD11, ANA LILIA overnight, In AM, patient coughing and produced small amount of blood tinged sputum. CXR completed showing diffuse reticular markings s/o fluid overload. 20IV lasix given. AM Na 124, repeat Na 4 hours after lasix was 119. Urine labs sent.  Given 1L NS bolus in consultation with hospitalist. NaCl tabs started 2gq6. 8pm Na 121.   3/19: POD12. AM Na 123 up from 121. S+S rec wearing PMV while taking meds, drinking and when observed coughing to aid in clearing secretions. Ate 2 meals, NGT removed.  3/20: POD13. ANA LILIA o/n neuro stable. Heme onc signed off for thrombocytopenia. TTE showing EF 37%. Lantus 5u dc'd per endocrinology. MRI completed  3/21: POD14, ANA LILIA o/n, neuro stable    Vital Signs Last 24 Hrs  T(C): 37 (20 Mar 2023 22:06), Max: 37 (20 Mar 2023 22:06)  T(F): 98.6 (20 Mar 2023 22:06), Max: 98.6 (20 Mar 2023 22:06)  HR: 62 (20 Mar 2023 23:24) (56 - 72)  BP: 125/67 (20 Mar 2023 23:24) (100/59 - 125/67)  BP(mean): 90 (20 Mar 2023 23:24) (74 - 92)  RR: 17 (20 Mar 2023 23:24) (17 - 18)  SpO2: 99% (20 Mar 2023 23:24) (95% - 99%)    Parameters below as of 20 Mar 2023 23:24  Patient On (Oxygen Delivery Method): tracheostomy collar  O2 Flow (L/min): 10  O2 Concentration (%): 40    I&O's Summary    19 Mar 2023 07:01  -  20 Mar 2023 07:00  --------------------------------------------------------  IN: 950 mL / OUT: 2100 mL / NET: -1150 mL    20 Mar 2023 07:01  -  21 Mar 2023 00:22  --------------------------------------------------------  IN: 480 mL / OUT: 1250 mL / NET: -770 mL        PHYSICAL EXAM:  General: patient seen laying supine in bed in NAD  Neuro: AAOx3, follows commands, opens eyes spontaneously, speech clear and fluent, CNII-XI grossly intact, face symmetric, no pronator drift,  strength 5/5 b/l upper extremities and lower extremities, sensation intact to light touch throughout  HEENT: PERRL, EOMI, +trach  Neck: supple  Cardiac: RRR, S1S2  Pulmonary: chest rise symmetric  Abdomen: soft, nontender, nondistended  Ext: perfusing well  Skin: warm, dry          DIET:  [] NPO  [x] Mechanical  [] Tube feeds    LABS:                        9.3    4.38  )-----------( 123      ( 20 Mar 2023 05:30 )             27.2     03-20    125<L>  |  95<L>  |  8   ----------------------------<  133<H>  4.3   |  24  |  0.65    Ca    7.9<L>      20 Mar 2023 05:30  Phos  2.1     03-20  Mg     1.8     03-20    TPro  6.0  /  Alb  3.2<L>  /  TBili  0.5  /  DBili  x   /  AST  32  /  ALT  49<H>  /  AlkPhos  81  03-19            CAPILLARY BLOOD GLUCOSE      POCT Blood Glucose.: 111 mg/dL (20 Mar 2023 23:25)  POCT Blood Glucose.: 94 mg/dL (20 Mar 2023 17:22)  POCT Blood Glucose.: 110 mg/dL (20 Mar 2023 11:44)  POCT Blood Glucose.: 133 mg/dL (20 Mar 2023 07:56)  POCT Blood Glucose.: 123 mg/dL (20 Mar 2023 05:49)  POCT Blood Glucose.: 109 mg/dL (20 Mar 2023 01:08)      Drug Levels: [] N/A    CSF Analysis: [] N/A      Allergies    amoxicillin (Rash)    Intolerances      MEDICATIONS:  Antibiotics:    Neuro:  acetaminophen     Tablet .. 650 milliGRAM(s) Oral every 6 hours PRN  escitalopram 5 milliGRAM(s) Oral daily  levETIRAcetam 750 milliGRAM(s) Oral two times a day  melatonin 5 milliGRAM(s) Oral at bedtime  ondansetron Injectable 4 milliGRAM(s) IV Push every 8 hours PRN    Anticoagulation:  enoxaparin Injectable 75 milliGRAM(s) SubCutaneous every 12 hours    OTHER:  acetylcysteine 20%  Inhalation 4 milliLiter(s) Inhalation every 6 hours PRN  albuterol/ipratropium for Nebulization 3 milliLiter(s) Nebulizer every 6 hours PRN  atorvastatin 20 milliGRAM(s) Oral at bedtime  benzocaine/menthol Lozenge 1 Lozenge Oral daily PRN  fluticasone propionate 50 MICROgram(s)/spray Nasal Spray 1 Spray(s) Both Nostrils two times a day  insulin lispro Injectable (ADMELOG) 4 Unit(s) SubCutaneous three times a day before meals  insulin regular  human corrective regimen sliding scale   SubCutaneous every 6 hours  metoprolol tartrate 25 milliGRAM(s) Oral every 12 hours  sacubitril 24 mG/valsartan 26 mG 1 Tablet(s) Oral every 12 hours  sodium chloride 3%  Inhalation 4 milliLiter(s) Inhalation every 6 hours PRN    IVF:  sodium chloride 2 Gram(s) Oral every 6 hours    CULTURES:  Culture Results:   No growth at 5 days. (03-12 @ 12:22)  Culture Results:   No growth at 5 days. (03-12 @ 12:22)    RADIOLOGY & ADDITIONAL TESTS:    C71.9    Abnormal electrocardiogram [ECG] [EKG]    Allergy, unspecified, initial encounter    Anemia, unspecified    Benign prostatic hyperplasia without lower urinary tract symptoms    Calculus of kidney    Cough, unspecified    Depression, unspecified    ENCOUNTER FOR ATTENT    Encounter for general adult medical examination without abnormal findings    Encounter for immunization    Encounter for immunization safety counseling    Encounter for other preprocedural examination    Encounter for screening for malignant neoplasm of prostate    Encounter for screening for other metabolic disorders    Encounter for screening for other viral diseases    ESSENTIAL (PRIMARY)    Gastro-esophageal reflux disease without esophagitis    Generalized anxiety disorder    History of Glioblastoma    Hyperlipidemia, unspecified    Hypo-osmolality and hyponatremia    Hypotension, unspecified    Iron deficiency    Iron deficiency anemia, unspecified    LONG TERM (CURRENT)    LONG TERM (CURRENT)    LONG TERM (CURRENT)    Malignant neoplasm of brain, unspecified    Nausea with vomiting, unspecified    Other constipation    Other fatigue    Other injury of unspecified body region, initial encounter    OTHER SPECIFIED DISE    Personal history of malignant neoplasm, unspecified    Personal history of other diseases of the nervous system and sense organs    Personal history of other endocrine, nutritional and metabolic disease    Personal history of other mental and behavioral disorders    Personal history of transient ischemic attack (TIA), and cerebral infarction without residual deficits    POSTPROCEDURAL SUBGL    Pruritus, unspecified    Shortness of breath    Tachycardia, unspecified    Thrombocytopenia, unspecified    Unspecified abdominal pain    Unspecified visual disturbance    Urinary calculus, unspecified    Vitamin D deficiency, unspecified    No pertinent family history in first degree relatives    FH: diabetes mellitus (Father, Mother)    FH: hyperlipidemia (Father)    FH: hypertension (Father, Mother)    Handoff    MEWS Score    No pertinent past medical history    Diabetes mellitus    Hypertension    Glioblastoma    Type 2 diabetes mellitus    Hyperlipidemia    Iron deficiency anemia    Nephrolithiasis    Thrombocytopenia    Hyponatremia    BPH (benign prostatic hyperplasia)    No pertinent past medical history    GBM (glioblastoma multiforme)    GBM (glioblastoma multiforme)    Angiogram, carotid and cerebral arteries    Glioblastoma    Acute respiratory failure with hypoxia    Tracheostomy status    Acute hyponatremia    Glioblastoma    Flash pulmonary edema    Takotsubo cardiomyopathy    Cardiac arrhythmia    DKA (diabetic ketoacidosis)    Gram-negative pneumonia    Hyponatremia    Angiogram, carotid and cerebral arteries    No significant past surgical history    No significant past surgical history    S/P craniotomy    H/O tracheostomy    No significant past surgical history    Acute respiratory failure with hypoxia    Acute hyponatremia    Takotsubo cardiomyopathy    Hyponatremia    Cardiac arrhythmia    DVT, lower extremity    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        ASSESSMENT:  69 y/o right handed male with pmhx of T2DM, HLD, JAGDISH, hyponatremia, tracheal stenosis s/p tracheostomy, and glioblastoma now s/p cerebral angiogram with Avastin treatment (3/7/23). c/b cardiogenic shock, cardiomyopathy, improving.     Neuro   - Vitals/neuro q4   - CTA/CTP/repeat CTH negative   - Pain control   - Cont home Keppra 750 BID   - MRI w/wo completed 3/20  - continue home med: Continue Lexapro 5 mg daily     Cards  - SBP    - TTE EF 15-20%, akinetic apex and midsegments suggestive of Takotsubo  - Repeat echo 3/13, EF 20% needs repeat echo, repeat echo 3/20: EF 37%  - s/p amiodarone for SVT D/c'ed 3/17 per cards, likely was 2/2 to sepsis     Pulm  - + Trach collar, tolerating passy hai valve   - Duonebs, mucomyst, saline nebs for secretions prn   - Pulm following, signed off   - trach exchanged to 6 shiley cuffless 3/16 by ENT    GI  - Consistent carb diet  - Bowel regimen held for loose stool, + banatrol, last BM 3/17    RENAL:   - Chronic hyponatremia   - NaCl tabs 2q6  - Voiding     HEME:  - DVT ppx: SQL 75 bid, SCD L side only  - Repeat anti-xa 0.56 on 3/17  - LE dopplers + R IM calf DVT, 3/14 chronic R IM calf DVT  - Heme consulted for thrombocytopenia - s/p iron x 3 days    ID:   - Last panculture 3/12, Sputum psudomonas + staph aureus, +serratia marcesans   - s/p Cefepime 2 g q 8 (3/12 - 3/18) x 7 d   - C.diff negative      ENDO:   - DKA resolved    - DM: A1C 5.8, low dose ISS   - lispro 4u premeal per endo    DISPO:   - stepdown status, full code, dispo pending     D/W Dr. Ho       Assessment:  Present when checked    []  GCS  E   V  M     Heart Failure: []Acute, [] acute on chronic , []chronic  Heart Failure:  [] Diastolic (HFpEF), [] Systolic (HFrEF), []Combined (HFpEF and HFrEF), [] RHF, [] Pulm HTN, [] Other    [] EUGENIA, [] ATN, [] AIN, [] other  [] CKD1, [] CKD2, [] CKD 3, [] CKD 4, [] CKD 5, []ESRD    Encephalopathy: [] Metabolic, [] Hepatic, [] toxic, [] Neurological, [] Other    Abnormal Nurtitional Status: [] malnurtition (see nutrition note), [ ]underweight: BMI < 19, [] morbid obesity: BMI >40, [] Cachexia    [] Sepsis  [] hypovolemic shock,[] cardiogenic shock, [] hemorrhagic shock, [] neuogenic shock  [] Acute Respiratory Failure  []Cerebral edema, [] Brain compression/ herniation,   [] Functional quadriplegia  [] Acute blood loss anemia   HPI:  67 y/o right handed male with pmhx of T2DM, HLD, JAGDISH, hyponatremia, tracheal stenosis s/p tracheostomy, and glioblastoma presents for cerebral angiogram with Avastin treatment.    Daughter states in January 2022, patient had onset of confusion, dizziness, and speech difficulty while in [place] and was diagnosed with brain mass. Pt evaluated upon return to the [place] at [facility] ED with CT head revealing left frontal brain mass. Pt underwent resection of left frontal enhancing tumor with GLEOLAN placed 1/27/2022. Path showed gliosarcoma, WHO grade IV, IDH wild-type, EGFR non amplified, MGMT promotor methylated. Pt was discharged to rehab and underwent radiation treatment and Temodar chemotherapy (completed 12/22). MRI 12/22 showed recurrence of brain mass and pt presents today referred by Dr. Mackenzie for trial participation cerebral angiogram with Avastin treatment.    Pt complains of dizziness. Denies headache, nausea, vomiting, weakness, numbness, chest pain, dyspnea.   (07 Mar 2023 12:30)      Subjective:  MRI completed. ANA LILIA o/n. Patient denies any acute complaints.     Hospital course:  3/7: POD0 cerebral angiogram IA avastin. post op in cath lab hypertensive, +flash pulmonary edema w/ desaturation, given diuretics. on cpap   3/8: POD1. o/n new global aphasia, right side w/d. CTH/CTA/CTP performed. ABG o/n with metabolic acidosis, lactate 7.9 and hyperglycemic 320s. started on insulin gtt, 1L NS bolus and NS@200cc/hr. desatting on cpap, placed on full vent support. propofol for sedation. in SVT, resolved with 5 lopresor. fluids stopped due to worsening CXR. repeat lactate 12, given 1amp bicarb then started on bicarb gtt. POCUS this am with hypokinetic left ventricle, pending echo. Cards/nephro/ endo consulted for further recs. Insulin gtt dc'd. VEEG monitoring. S/p 3%Na.  SQL started tonight. S/p 10 NPH after FS of 166. steven gtt. S/p 40 IV lasix. Troponin downtrending 0.32 to 0.18. EEG negative for seizures. Pancultured spiked 101.5. UA negative. Bicarb gtt dc'd. TTE EF15-20%, akinetic apex and midsegments suggestive of takotsubo  3/9: POD2. ANA LILIA o/n neuro stable. remains sedated on propofol, on levo gtt. Propofol dc'd. s/p lasix 40 mg IV x 1. CXR L effusion improved. Started lantus 10u at bedtime per endocrine. Campuzano removed, f/u TOV.    3/10: POD3 ANA LILIA overnight. Neuro exam stable. multiple bouts of diarrhea overnight and AM. Tube feeds held. s/p albumin 250 cc +  cc for tachycardia in the setting of negative fluid status. Tolerating CPAP. Tolerating trach collar x 3 hours, switched back to CPAP overnight. Off levo gtt.   3/11: POD4 Episode of O2 desaturation to low 90s while on 40% FiO2. FiO2 increased to 60% briefly with improvement in O2, good breath sounds throughout, Pt denies SOB and CXR stable. Neuro exam stable. CXR with worsening congestion, given Lasix 40mg IV, on full vent support. Heme consulted for thrombocytopenia. Fever 101F. Episode of SVT with HR to 180s and hypotensive to 80s. Started on Steven, EKG and IV tylenol. Cards fellow called to bedside. Lopressor 5mg IV given with good resolution. Vital signs normalized. Patient remained neuro intact. Started on maintenance lopressor 12.5bid and amio gtt per cards. Given another Lasix 40mg IV at 6pm. Added regular insulin 2 units q 6 per endo.  3/12: POD5 Pt remains on full vent support. Neuro exam stable. Spiked 101.7F fever, pan cultured. Started on vanco/cefepine for empiric coverage. Lasix 40mg IV given in afternoon. Started on IV iron x 3 days, per heme. Regular insulin increased to 6q6, per endo. Amio PO, increased lopressor to 25bid, per cards.  3/13: POD 6. ANA LILIA overnight, exam stable, c-diff sent for multiple loose BMs/febrile/off bowel regimen since 3/7, negative. low grade fever overnight 100.2 pan cx yesterday for fever 101.7. C. diff negative. Cefepime x 7 d for psudomonal PNA. Repeat echo done, EF 20%. Osmolite changed to Vital per endo recs for loose stools.   3/14: POD7. ANA LILIA o/n neuro stable. started on full dose SQL for DVT. 20mg lasix given for diuresis. LE dopplers showing chronic R IM calf DVT. Tolerating trach collar. Restarted his home lexapro for agitation. Increased regular insulin to 6 units. Pocus w/ several b-lines given 40 mg lasix, repeat chest xray in am. Regular insulin held for sugar 89.   3/15: POD 8 IA avastin. Overnight Increased regular insulin to 6 units. Pocus w/ several b-lines given 40 mg lasix, repeat chest xray in am. Regular insulin held for sugar 89.   CXR this mornng with pulm congestion, given lasix 40mg IV. Speech/swallow pathologist evaluated today. Plan for FEES tomorrow. Per endo, 4 regular insulin given.  3/16: POD9, overnight dc'd salt tabs, anti-xa 0.4 will recheck after next 2 doses. ENT exchanged to 6 shiley cuffless. FEES done, able to tolerate regular diet with thin liquids. anti-10a low 0.43, increased lovenox to 75BID. Tube feeds held during the day, restarted at 9PM, fingesticks borderline low, lantus 5 units given, regular insulin held.  3/17: POD10, ANA LILIA overnight, amiodarone d/c'ed, monitor for arrhythmias/SVT. LFTs elevated this am, trend (recheck in 2 days). Pend MRI brain, repeat echo on 3/20. Pend anti-Xa this evening at 10pm. Pend possible discharge to rehab Mon 3/19. Entresto 24/26 BID started per cardiology recs. Endo rec lantus 5u and lispro 4u premeal. Antixa appropriate.   3/18: POD11, ANA LILIA overnight, In AM, patient coughing and produced small amount of blood tinged sputum. CXR completed showing diffuse reticular markings s/o fluid overload. 20IV lasix given. AM Na 124, repeat Na 4 hours after lasix was 119. Urine labs sent.  Given 1L NS bolus in consultation with hospitalist. NaCl tabs started 2gq6. 8pm Na 121.   3/19: POD12. AM Na 123 up from 121. S+S rec wearing PMV while taking meds, drinking and when observed coughing to aid in clearing secretions. Ate 2 meals, NGT removed.  3/20: POD13. ANA LILIA o/n neuro stable. Heme onc signed off for thrombocytopenia. TTE showing EF 37%. Lantus 5u dc'd per endocrinology. MRI completed  3/21: POD14, ANA LILIA o/n, neuro stable. Metoprolol tartrate 25bid changed to metoprolol succinate 50 daily per cardiology.     Vital Signs Last 24 Hrs  T(C): 37 (20 Mar 2023 22:06), Max: 37 (20 Mar 2023 22:06)  T(F): 98.6 (20 Mar 2023 22:06), Max: 98.6 (20 Mar 2023 22:06)  HR: 62 (20 Mar 2023 23:24) (56 - 72)  BP: 125/67 (20 Mar 2023 23:24) (100/59 - 125/67)  BP(mean): 90 (20 Mar 2023 23:24) (74 - 92)  RR: 17 (20 Mar 2023 23:24) (17 - 18)  SpO2: 99% (20 Mar 2023 23:24) (95% - 99%)    Parameters below as of 20 Mar 2023 23:24  Patient On (Oxygen Delivery Method): tracheostomy collar  O2 Flow (L/min): 10  O2 Concentration (%): 40    I&O's Summary    19 Mar 2023 07:01  -  20 Mar 2023 07:00  --------------------------------------------------------  IN: 950 mL / OUT: 2100 mL / NET: -1150 mL    20 Mar 2023 07:01  -  21 Mar 2023 00:22  --------------------------------------------------------  IN: 480 mL / OUT: 1250 mL / NET: -770 mL        PHYSICAL EXAM:  General: patient seen laying supine in bed in NAD  Neuro: AAOx3, follows commands, opens eyes spontaneously, speech clear and fluent, CNII-XI grossly intact, face symmetric, no pronator drift,  strength 5/5 b/l upper extremities and lower extremities, sensation intact to light touch throughout  HEENT: PERRL, EOMI, +trach  Neck: supple  Cardiac: RRR, S1S2  Pulmonary: chest rise symmetric  Abdomen: soft, nontender, nondistended  Ext: perfusing well  Skin: warm, dry          DIET:  [] NPO  [x] Mechanical  [] Tube feeds    LABS:                        9.3    4.38  )-----------( 123      ( 20 Mar 2023 05:30 )             27.2     03-20    125<L>  |  95<L>  |  8   ----------------------------<  133<H>  4.3   |  24  |  0.65    Ca    7.9<L>      20 Mar 2023 05:30  Phos  2.1     03-20  Mg     1.8     03-20    TPro  6.0  /  Alb  3.2<L>  /  TBili  0.5  /  DBili  x   /  AST  32  /  ALT  49<H>  /  AlkPhos  81  03-19            CAPILLARY BLOOD GLUCOSE      POCT Blood Glucose.: 111 mg/dL (20 Mar 2023 23:25)  POCT Blood Glucose.: 94 mg/dL (20 Mar 2023 17:22)  POCT Blood Glucose.: 110 mg/dL (20 Mar 2023 11:44)  POCT Blood Glucose.: 133 mg/dL (20 Mar 2023 07:56)  POCT Blood Glucose.: 123 mg/dL (20 Mar 2023 05:49)  POCT Blood Glucose.: 109 mg/dL (20 Mar 2023 01:08)      Drug Levels: [] N/A    CSF Analysis: [] N/A      Allergies    amoxicillin (Rash)    Intolerances      MEDICATIONS:  Antibiotics:    Neuro:  acetaminophen     Tablet .. 650 milliGRAM(s) Oral every 6 hours PRN  escitalopram 5 milliGRAM(s) Oral daily  levETIRAcetam 750 milliGRAM(s) Oral two times a day  melatonin 5 milliGRAM(s) Oral at bedtime  ondansetron Injectable 4 milliGRAM(s) IV Push every 8 hours PRN    Anticoagulation:  enoxaparin Injectable 75 milliGRAM(s) SubCutaneous every 12 hours    OTHER:  acetylcysteine 20%  Inhalation 4 milliLiter(s) Inhalation every 6 hours PRN  albuterol/ipratropium for Nebulization 3 milliLiter(s) Nebulizer every 6 hours PRN  atorvastatin 20 milliGRAM(s) Oral at bedtime  benzocaine/menthol Lozenge 1 Lozenge Oral daily PRN  fluticasone propionate 50 MICROgram(s)/spray Nasal Spray 1 Spray(s) Both Nostrils two times a day  insulin lispro Injectable (ADMELOG) 4 Unit(s) SubCutaneous three times a day before meals  insulin regular  human corrective regimen sliding scale   SubCutaneous every 6 hours  metoprolol tartrate 25 milliGRAM(s) Oral every 12 hours  sacubitril 24 mG/valsartan 26 mG 1 Tablet(s) Oral every 12 hours  sodium chloride 3%  Inhalation 4 milliLiter(s) Inhalation every 6 hours PRN    IVF:  sodium chloride 2 Gram(s) Oral every 6 hours    CULTURES:  Culture Results:   No growth at 5 days. (03-12 @ 12:22)  Culture Results:   No growth at 5 days. (03-12 @ 12:22)    RADIOLOGY & ADDITIONAL TESTS:    C71.9    Abnormal electrocardiogram [ECG] [EKG]    Allergy, unspecified, initial encounter    Anemia, unspecified    Benign prostatic hyperplasia without lower urinary tract symptoms    Calculus of kidney    Cough, unspecified    Depression, unspecified    ENCOUNTER FOR ATTENT    Encounter for general adult medical examination without abnormal findings    Encounter for immunization    Encounter for immunization safety counseling    Encounter for other preprocedural examination    Encounter for screening for malignant neoplasm of prostate    Encounter for screening for other metabolic disorders    Encounter for screening for other viral diseases    ESSENTIAL (PRIMARY)    Gastro-esophageal reflux disease without esophagitis    Generalized anxiety disorder    History of Glioblastoma    Hyperlipidemia, unspecified    Hypo-osmolality and hyponatremia    Hypotension, unspecified    Iron deficiency    Iron deficiency anemia, unspecified    LONG TERM (CURRENT)    LONG TERM (CURRENT)    LONG TERM (CURRENT)    Malignant neoplasm of brain, unspecified    Nausea with vomiting, unspecified    Other constipation    Other fatigue    Other injury of unspecified body region, initial encounter    OTHER SPECIFIED DISE    Personal history of malignant neoplasm, unspecified    Personal history of other diseases of the nervous system and sense organs    Personal history of other endocrine, nutritional and metabolic disease    Personal history of other mental and behavioral disorders    Personal history of transient ischemic attack (TIA), and cerebral infarction without residual deficits    POSTPROCEDURAL SUBGL    Pruritus, unspecified    Shortness of breath    Tachycardia, unspecified    Thrombocytopenia, unspecified    Unspecified abdominal pain    Unspecified visual disturbance    Urinary calculus, unspecified    Vitamin D deficiency, unspecified    No pertinent family history in first degree relatives    FH: diabetes mellitus (Father, Mother)    FH: hyperlipidemia (Father)    FH: hypertension (Father, Mother)    Handoff    MEWS Score    No pertinent past medical history    Diabetes mellitus    Hypertension    Glioblastoma    Type 2 diabetes mellitus    Hyperlipidemia    Iron deficiency anemia    Nephrolithiasis    Thrombocytopenia    Hyponatremia    BPH (benign prostatic hyperplasia)    No pertinent past medical history    GBM (glioblastoma multiforme)    GBM (glioblastoma multiforme)    Angiogram, carotid and cerebral arteries    Glioblastoma    Acute respiratory failure with hypoxia    Tracheostomy status    Acute hyponatremia    Glioblastoma    Flash pulmonary edema    Takotsubo cardiomyopathy    Cardiac arrhythmia    DKA (diabetic ketoacidosis)    Gram-negative pneumonia    Hyponatremia    Angiogram, carotid and cerebral arteries    No significant past surgical history    No significant past surgical history    S/P craniotomy    H/O tracheostomy    No significant past surgical history    Acute respiratory failure with hypoxia    Acute hyponatremia    Takotsubo cardiomyopathy    Hyponatremia    Cardiac arrhythmia    DVT, lower extremity    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        ASSESSMENT:  67 y/o right handed male with pmhx of T2DM, HLD, JAGDISH, hyponatremia, tracheal stenosis s/p tracheostomy, and glioblastoma now s/p cerebral angiogram with Avastin treatment (3/7/23). c/b cardiogenic shock, cardiomyopathy, improving.     Neuro   - Vitals/neuro q4   - CTA/CTP/repeat CTH negative   - Pain control   - Cont home Keppra 750 BID   - MRI w/wo completed 3/20  - continue home med: Continue Lexapro 5 mg daily     Cards  - SBP    - TTE EF 15-20%, akinetic apex and midsegments suggestive of Takotsubo  - Repeat echo 3/13, EF 20% needs repeat echo, repeat echo 3/20: EF 37%  - s/p amiodarone for SVT D/c'ed 3/17 per cards, likely was 2/2 to sepsis   - metoprolol succinate 50mg daily  - entresto 24/26 daily    Pulm  - + Trach collar, tolerating passy hai valve   - Duonebs, mucomyst, saline nebs for secretions prn   - Pulm following, signed off   - trach exchanged to 6 shiley cuffless 3/16 by ENT    GI  - Consistent carb diet  - Bowel regimen held for loose stool, + banatrol, last BM 3/17    RENAL:   - Chronic hyponatremia   - NaCl tabs 2q6  - Voiding     HEME:  - DVT ppx: SQL 75 bid, SCD L side only  - Repeat anti-xa 0.56 on 3/17  - LE dopplers + R IM calf DVT, 3/14 chronic R IM calf DVT  - Heme consulted for thrombocytopenia - s/p iron x 3 days    ID:   - Last panculture 3/12, Sputum psudomonas + staph aureus, +serratia marcesans   - s/p Cefepime 2 g q 8 (3/12 - 3/18) x 7 d   - C.diff negative      ENDO:   - DKA resolved    - DM: A1C 5.8, low dose ISS   - lispro 4u premeal per endo    DISPO:   - stepdown status, full code, dispo pending     D/W Dr. Ho       Assessment:  Present when checked    []  GCS  E   V  M     Heart Failure: []Acute, [] acute on chronic , []chronic  Heart Failure:  [] Diastolic (HFpEF), [] Systolic (HFrEF), []Combined (HFpEF and HFrEF), [] RHF, [] Pulm HTN, [] Other    [] EUGENIA, [] ATN, [] AIN, [] other  [] CKD1, [] CKD2, [] CKD 3, [] CKD 4, [] CKD 5, []ESRD    Encephalopathy: [] Metabolic, [] Hepatic, [] toxic, [] Neurological, [] Other    Abnormal Nurtitional Status: [] malnurtition (see nutrition note), [ ]underweight: BMI < 19, [] morbid obesity: BMI >40, [] Cachexia    [] Sepsis  [] hypovolemic shock,[] cardiogenic shock, [] hemorrhagic shock, [] neuogenic shock  [] Acute Respiratory Failure  []Cerebral edema, [] Brain compression/ herniation,   [] Functional quadriplegia  [] Acute blood loss anemia

## 2023-03-21 NOTE — PROGRESS NOTE ADULT - TIME-BASED BILLING (NON-CRITICAL CARE)
Time-based billing (NON-critical care)

## 2023-03-21 NOTE — PROGRESS NOTE ADULT - PROBLEM SELECTOR PLAN 5
In the setting of takutsubo? Monitor clinically
resolved
In the setting of takutsubo? Monitor clinically
In the setting of takutsubo? Monitor clinically  repeat Echo pending

## 2023-03-21 NOTE — PROGRESS NOTE ADULT - PROBLEM SELECTOR PLAN 4
Management a/p primary team  s/p angiogram w/ zehra 3/7.
Management a/p primary team  s/p angiogram w/ zehra 3/7.
Management a/p primary team  s/p angiogram w/ zerha 3/7.
Management a/p primary team  s/p angiogram w/ zehra 3/7.
04-Jan-2022 04:00

## 2023-03-21 NOTE — DISCHARGE NOTE NURSING/CASE MANAGEMENT/SOCIAL WORK - NSDCFUADDAPPT_GEN_ALL_CORE_FT
Please follow up with Dr. Ho, call the office to make/confirm appointment at [phone]    Please follow up with Cardiology outpatient for management of Cardiomyopathy     Please follow up with Hematology/Oncology outpatient for management of GBM and DVT     Please follow up with ENT outpatient for management of Tracheostomy     Please follow up at discharge with [facility] by calling [phone] to make an appointment.      Please follow up with Cardiology, Dr. Chiang, in 1-2 weeks for management of heart failure.     Please follow up with your primary care doctor

## 2023-03-21 NOTE — PATIENT PROFILE ADULT - NSPROGENBLOODRESTRICT_GEN_A_NUR
----- Message from Dior Boone RN sent at 7/24/2019 12:09 PM CDT -----  Call in 2 weeks to see how patient is doing on buspar  
Message left for patient to return call to clinic  
Patient did return call and he states he is feeling much better.  He states his anxiety is much better,  He will call with questions or concerns  
Second message left  
none

## 2023-03-21 NOTE — PROGRESS NOTE ADULT - SUBJECTIVE AND OBJECTIVE BOX
Interval Events: Reviewed  Patient seen and examined at bedside.    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      Type 2 diabetes mellitus      Hyperlipidemia      Iron deficiency anemia      Nephrolithiasis      Thrombocytopenia      Hyponatremia      BPH (benign prostatic hyperplasia)      S/P craniotomy      H/O tracheostomy          MEDICATIONS:  Pulmonary:  acetylcysteine 20%  Inhalation 4 milliLiter(s) Inhalation every 6 hours PRN  albuterol/ipratropium for Nebulization 3 milliLiter(s) Nebulizer every 6 hours PRN  sodium chloride 3%  Inhalation 4 milliLiter(s) Inhalation every 6 hours PRN    Antimicrobials:    Anticoagulants:  enoxaparin Injectable 75 milliGRAM(s) SubCutaneous every 12 hours    Cardiac:  metoprolol succinate ER 50 milliGRAM(s) Oral daily  sacubitril 24 mG/valsartan 26 mG 1 Tablet(s) Oral every 12 hours      Allergies    amoxicillin (Rash)    Intolerances        Vital Signs Last 24 Hrs  T(C): 37.2 (21 Mar 2023 09:17), Max: 37.2 (21 Mar 2023 09:17)  T(F): 98.9 (21 Mar 2023 09:17), Max: 98.9 (21 Mar 2023 09:17)  HR: 58 (21 Mar 2023 11:54) (55 - 72)  BP: 111/63 (21 Mar 2023 11:54) (105/54 - 125/67)  BP(mean): 81 (21 Mar 2023 11:54) (75 - 90)  RR: 18 (21 Mar 2023 11:54) (16 - 18)  SpO2: 96% (21 Mar 2023 11:54) (95% - 99%)    Parameters below as of 21 Mar 2023 11:54  Patient On (Oxygen Delivery Method): room air  O2 Flow (L/min): 0  O2 Concentration (%): 0    03-20 @ 07:01  -  03-21 @ 07:00  --------------------------------------------------------  IN: 480 mL / OUT: 2475 mL / NET: -1995 mL    03-21 @ 07:01 - 03-21 @ 18:17  --------------------------------------------------------  IN: 480 mL / OUT: 550 mL / NET: -70 mL          Review of Systems:   •	General: negative  •	Skin/Breast: negative  •	Ophthalmologic: negative  •	ENMT: negative  •	Respiratory and Thorax: negative  •	Cardiovascular: negative  •	Gastrointestinal: negative  •	Genitourinary: negative  •	Musculoskeletal: negative  •	Neurological: negative  •	Psychiatric: negative  •	Hematology/Lymphatics: negative  •	Endocrine: negative  •	Allergic/Immunologic: negative    Physical Exam:   • Constitutional:	refer to the dietion /Nutritionist note  • Eyes:	EOMI; PERRL; no drainage or redness  • ENMT:	No oral lesions; no gross abnormalities  • Neck	No bruits; no thyromegaly or nodules  • Breasts:	not examined  • Back:	No deformity or limitation of movement  • Respiratory:	Breath Sounds equal & clear to percussion & auscultation, no accessory muscle use  • Cardiovascular:	Regular rate & rhythm, normal S1, S2; no murmurs, gallops or rubs; no S3, S4  • Gastrointestinal:	Soft, non-tender, no hepatosplenomegaly, normal bowel sounds  • Genitourinary:	not examined  • Rectal: not examined  • Extremities:	No cyanosis, clubbing or edema  • Vascular:	Equal and normal pulses (carotid, femoral, dorsalis pedis)  • Neurologica:l	not examined  • Skin:	No lesions; no rash  • Lymph Nodes:	No lymphadedenopathy  • Musculoskeletal:	No joint pain, swelling or deformity; no limitation of movement        LABS:      CBC Full  -  ( 21 Mar 2023 07:09 )  WBC Count : 3.77 K/uL  RBC Count : 2.91 M/uL  Hemoglobin : 9.7 g/dL  Hematocrit : 29.1 %  Platelet Count - Automated : 121 K/uL  Mean Cell Volume : 100.0 fl  Mean Cell Hemoglobin : 33.3 pg  Mean Cell Hemoglobin Concentration : 33.3 gm/dL  Auto Neutrophil # : x  Auto Lymphocyte # : x  Auto Monocyte # : x  Auto Eosinophil # : x  Auto Basophil # : x  Auto Neutrophil % : x  Auto Lymphocyte % : x  Auto Monocyte % : x  Auto Eosinophil % : x  Auto Basophil % : x    03-21    127<L>  |  95<L>  |  11  ----------------------------<  116<H>  4.5   |  22  |  0.69    Ca    8.3<L>      21 Mar 2023 07:09  Phos  2.4     03-21  Mg     1.8     03-21                          RADIOLOGY & ADDITIONAL STUDIES (The following images were personally reviewed):

## 2023-03-21 NOTE — H&P ADULT - NSHPLABSRESULTS_GEN_ALL_CORE
RECENT LABS/IMAGING                        9.7    3.77  )-----------( 121      ( 21 Mar 2023 07:09 )             29.1     03-21    127<L>  |  95<L>  |  11  ----------------------------<  116<H>  4.5   |  22  |  0.69    Ca    8.3<L>      21 Mar 2023 07:09  Phos  2.4     03-21  Mg     1.8     03-21     MR Head w/wo IV Cont (03.20.23 @ 21:58)     IMPRESSION: Patient status post left frontal craniotomy with interval improvement in the enhancement pattern along the margins of the frontal lobe resection cavity. Slight interval progression of FLAIR/T2 signal   abnormality within the left frontal lobe. Interval development of 2 small   nodular focus of enhancement within the left basal ganglia. Continued follow-up is recommended.    US Duplex Venous Lower Ext Complete, Bilateral (03.14.23 @ 11:25) >    IMPRESSION:  1. RIGHT intramuscular calf DVT similar to 3/8/23 with slight interval decrease in caliber of vessel lumen suggestive of chronicity.  2. No new DVT.     CT Perfusion w/ Maps w/ IV Cont (03.08.23 @ 00:00)     IMPRESSION: Apparent increased maximum transit time not conforming to a   vascular territory with a total volume of 28 mL, likely artifactual.    CT Head No Cont (03.08.23 @ 00:00)     IMPRESSION:  Since 3/7/2023, there has been no significant interval change. No acute intracranial hemorrhage or acute transcortical infarction. Please note, the ventricles are unchanged in size and configuration dating back to at   least 12/15/2022. There is no evidence of acute hydrocephalus.    CT Head No Cont (03.07.23 @ 17:26)     IMPRESSION:  1.  No acute intracranial hemorrhage or acute transcortical infarction.  2.  Status post left frontal craniotomy. Stable postoperative findings.    MR Head w/wo IV Cont (02.16.23 @ 15:00)     IMPRESSION:  Question slight increased thickness in association with the   periventricular and periresection cavity enhancement when compared with   the prior minimally increased nodular enhancement along the posterior   medial aspect of the left frontal postsurgical cavity with extension to   the subependymal region of the left frontal horn also minimally increased   may indicate progressive neoplasm. Unchanged surrounding FLAIR/T2 signal   abnormality with extension across the genu of the corpus callosum and   involving the right frontal a periventricular white matter consistent   with posttreatment related changes and/or nonenhancing infiltrating   neoplasm. No definite perfusion abnormality similar in appearance   compared with the prior. RECENT LABS/IMAGING                        9.7    3.77  )-----------( 121      ( 21 Mar 2023 07:09 )             29.1     03-21    127<L>  |  95<L>  |  11  ----------------------------<  116<H>  4.5   |  22  |  0.69    Ca    8.3<L>      21 Mar 2023 07:09  Phos  2.4     03-21  Mg     1.8     03-21     MR Head w/wo IV Cont (03.20.23 @ 21:58)     IMPRESSION: Patient status post left frontal craniotomy with interval improvement in the enhancement pattern along the margins of the frontal lobe resection cavity. Slight interval progression of FLAIR/T2 signal   abnormality within the left frontal lobe. Interval development of 2 small   nodular focus of enhancement within the left basal ganglia. Continued follow-up is recommended.    US Duplex Venous Lower Ext Complete, Bilateral (03.14.23 @ 11:25) >    IMPRESSION:  1. RIGHT intramuscular calf DVT similar to 3/8/23 with slight interval decrease in caliber of vessel lumen suggestive of chronicity.  2. No new DVT.     CT Perfusion w/ Maps w/ IV Cont (03.08.23 @ 00:00)     IMPRESSION: Apparent increased maximum transit time not conforming to a   vascular territory with a total volume of 28 mL, likely artifactual.    CT Head No Cont (03.08.23 @ 00:00)     IMPRESSION:  Since 3/7/2023, there has been no significant interval change. No acute intracranial hemorrhage or acute transcortical infarction. Please note, the ventricles are unchanged in size and configuration dating back to at least 12/15/2022. There is no evidence of acute hydrocephalus.    CT Head No Cont (03.07.23 @ 17:26)     IMPRESSION:  1.  No acute intracranial hemorrhage or acute transcortical infarction.  2.  Status post left frontal craniotomy. Stable postoperative findings.    MR Head w/wo IV Cont (02.16.23 @ 15:00)     IMPRESSION:  Question slight increased thickness in association with the periventricular and periresection cavity enhancement when compared with the prior minimally increased nodular enhancement along the posterior medial aspect of the left frontal postsurgical cavity with extension to the subependymal region of the left frontal horn also minimally increased may indicate progressive neoplasm. Unchanged surrounding FLAIR/T2 signal abnormality with extension across the genu of the corpus callosum and involving the right frontal a periventricular white matter consistent with posttreatment related changes and/or nonenhancing infiltrating neoplasm. No definite perfusion abnormality similar in appearance compared with the prior.

## 2023-03-21 NOTE — PROGRESS NOTE ADULT - PROBLEM SELECTOR PROBLEM 2
Tracheostomy status

## 2023-03-21 NOTE — H&P ADULT - NSHPREVIEWOFSYSTEMS_GEN_ALL_CORE
REVIEW OF SYSTEMS  Constitutional: No fever, No Chills, + fatigue  HEENT: No eye pain, No visual disturbances, No difficulty hearing  Pulm: No cough,  No shortness of breath  Cardio: No chest pain, No palpitations  GI:  No abdominal pain, No nausea, No vomiting, No diarrhea, No constipation  : No dysuria, No frequency, No hematuria  Neuro: No headaches, No memory loss, + loss of strength, no numbness, No tremors  Skin: No itching, No rashes, No lesions   Endo: No temperature intolerance  MSK: No joint pain, No joint swelling, No muscle pain, No Neck pain,  No back pain  Psych:  No depression, No anxiety

## 2023-03-21 NOTE — PROGRESS NOTE ADULT - NSPROGADDITIONALINFOA_GEN_ALL_CORE
this patient was not seen prior to discharge  his EF improved likely not ischemic   discharge home on entresto 24/26 bid and metroprolol 50 daily  would prefer we stop lasix only take as needed

## 2023-03-21 NOTE — PROGRESS NOTE ADULT - PROVIDER SPECIALTY LIST ADULT
Cardiology
Endocrinology
NSICU
Neurosurgery
Cardiology
Endocrinology
Heme/Onc
Heme/Onc
Internal Medicine
Neurosurgery
Rehab Medicine
Cardiology
Endocrinology
NSICU
Nephrology
Nephrology
Neurosurgery
Cardiology
Cardiology
Heme/Onc
NSICU
Neurosurgery
NSICU
Hospitalist
NSICU
Pulmonology
Pulmonology
NSICU
Pulmonology
Hospitalist
Internal Medicine

## 2023-03-21 NOTE — PATIENT PROFILE ADULT - FALL HARM RISK - HARM RISK INTERVENTIONS

## 2023-03-21 NOTE — PROGRESS NOTE ADULT - ASSESSMENT
68M PMH T2DM, HLD, JAGDISH, hyponatremia, tracheal stenosis s/p tracheostomy, and glioblastoma s/p resection of the left frontal enhancing tumor admitted for cerebral angiogram with Avastin treatment in the setting of a recurrent mass c/b stress cardiomyopathy 2/2 excess norepinephrine.    Review of studies:  ECG: NSR, 1st degree AVB, 0.5mm- 1mm DAVID v1-v3, IVCD  TTE 3/2021: Normal Bi-V function  TTE with severely reduced LV systolic function EF 15-20%, apex and mid segments are akinetic  TTE 3/8/23: LVEF 15-20% . The entire apex and all the mid segments are akinetic, with relative sparing of the basal segments. In the absence of ischemic heart disease, this is suggestive of Takotsubo's cardiomyopathy. Clinical correlation is advised. With echocontrast imaging, there is no evidence of left ventricular thrombus. Normal right ventricular size and systolic function. Normal atria. Moderate tricuspid regurgitation. PASP 40 mmHg. No pericardial effusion. Mildly dilated aortic root.   TTE 3/13/23: EF is still reduced at 20%  JT 3/20/23: The left ventricle cavity size is normal. Left ventricular systolic function is moderately reduced with a calculated ejection fraction of 35-40% with regional wall motion abnormalities.     #Stress induced cardiomyopathy (Takotsubo cardiomyopathy)   Likely due to excess norepinephrine given relative good functional capacity, and normal TTE prior to the procedure.  Troponin peaked 0.32, no need to trend further. Off vasopressor support since 3/10. Briefly placed on neosynephrine during SVT episode, otherwise hemodynamically stable once in sinus rhythm.  - EF improved  - Volume: euvolemic Lasix prn   - JT with improved EF  - GDMT: c/w Entresto 24/26 mg bid, c/w Metoprolol    #SVT/Atrial fibrillation  Telemetry 3/11/23: one episode of atrial fibrillation and SVT to 180s in the late morning - subsequent sinus rhythm s/p IV lopressor.  Tele review: no further arrhythmias   - BB as above   - s/p IV amiodarone load. d/c Amiodarone 400mg BID. Patient is rate and rhythm controlled. SVT could have been provoked by sepsis. No further events on tele     Patient should follow up with Dr. Chiang as an outpatient w/i 2 weeks    Please reconsult cardiology as needed

## 2023-03-21 NOTE — PROGRESS NOTE ADULT - PROBLEM SELECTOR PLAN 2
Pulm recommending potential de-cannulation, would discuss with pulm/NSG
continue pulmoanry toilet
continue pulmoanry toilet
continue pulmonary toilet.  He has BMV on and tolerated well.  I will discuss with NS regarding removing the trach
continue MV
continue pulmonary toilet.  He has BMV on and tolerated well.  I will discuss with NS regarding removing the trach
continue pulmonary toilet
continue pulmonary toilet.  Will discuss change trach to fenestrated
continue MV
Plan as abov e

## 2023-03-21 NOTE — DISCHARGE NOTE NURSING/CASE MANAGEMENT/SOCIAL WORK - NSDCPEPTCAREGIVEDUMATLIST _GEN_ALL_CORE
----- Message from Maggie Valdivia sent at 10/29/2021  1:26 PM CDT -----  Regarding: nebulizer dx  Hello,    I am reaching out from [facility] in re: nebulizer order. Please note the dx shortness of breath is not a covered dx for the nebulizer setup on behalf of insurance.    Some covered dx asthma, chronic bronchitis, copd, emphysema.    Thank you  Maggie KITCHEN     Diabetes/Enoxaparin/Lovenox

## 2023-03-21 NOTE — H&P ADULT - NSHPPHYSICALEXAM_GEN_ALL_CORE
PHYSICAL EXAM  VITALS  T(C): 37.2 (03-21-23 @ 09:17), Max: 37.2 (03-21-23 @ 09:17)  HR: 58 (03-21-23 @ 11:54) (55 - 72)  BP: 111/63 (03-21-23 @ 11:54) (105/54 - 125/67)  RR: 18 (03-21-23 @ 11:54) (16 - 18)  SpO2: 96% (03-21-23 @ 11:54) (95% - 99%)    Gen - NAD, Comfortable  HEENT - NCAT, EOMI, MMM, + trach  Neck - Supple, No limited ROM  Pulm - CTAB, No wheeze, No rhonchi, No crackles  Cardiovascular - RRR, S1S2  Chest - good chest expansion, good respiratory effort  Abdomen - Soft, NT/ND, +BS  Extremities - No Cyanosis, no clubbing, no edema, no calf tenderness  Neuro-     Cognitive - awake, alert, oriented to person, place, date, year, and situation.  + follow command     Communication - Fluent, Comprehensible, No dysarthria     Attention: Intact, able to state days of week chronologically and backwards.     Judgement: Good evidence of judgement     Memory: Recall 3 objects immediate and 3 min later     Cranial Nerves - CN 2-12 intact.      Motor -  Right hemiparesis                    LEFT    UE - 5/5                    RIGHT UE - 4/5                    LEFT    LE -  5/5                    RIGHT LE -  5/5        Sensory - Intact to LT      Reflexes - DTR Intact, No primitive reflexive     Coordination - FTN/HTS intact     Tone - normal  Psychiatric - Mood stable, Affect WNL  Skin:  all skin intact PHYSICAL EXAM  VITALS  T(C): 37.2 (03-21-23 @ 09:17), Max: 37.2 (03-21-23 @ 09:17)  HR: 58 (03-21-23 @ 11:54) (55 - 72)  BP: 111/63 (03-21-23 @ 11:54) (105/54 - 125/67)  RR: 18 (03-21-23 @ 11:54) (16 - 18)  SpO2: 96% (03-21-23 @ 11:54) (95% - 99%)    Gen - NAD, Comfortable  HEENT - NCAT, EOMI, MMM, + trach with PMV  Neck - Supple, No limited ROM  Pulm - CTAB, No wheeze, No rhonchi, No crackles  Cardiovascular - RRR, S1S2  Chest - good chest expansion, good respiratory effort  Abdomen - Soft, NT/ND, +BS  Extremities - No Cyanosis, no clubbing, no edema, no calf tenderness  Neuro-     Cognitive - awake, alert, oriented to person, place, date, year, and situation.  + follow command     Communication - Fluent, Comprehensible, No dysarthria     Attention: Intact, able to state days of week chronologically and backwards.     Judgement: Good evidence of judgement     Memory: Recall 3 objects immediate and 3 min later     Cranial Nerves - CN 2-12 intact.      Motor -  Right hemiparesis                    LEFT    UE - 5/5                    RIGHT UE - 4+/5                    LEFT    LE -  5/5                    RIGHT LE -  5/5        Sensory - Intact to LT      Reflexes - DTR Intact, No primitive reflexive     Coordination - FTN/HTS intact     Tone - normal  Psychiatric - Mood stable, Affect WNL  Skin:  all skin intact PHYSICAL EXAM  VITALS  T(C): 37.2 (03-21-23 @ 09:17), Max: 37.2 (03-21-23 @ 09:17)  HR: 58 (03-21-23 @ 11:54) (55 - 72)  BP: 111/63 (03-21-23 @ 11:54) (105/54 - 125/67)  RR: 18 (03-21-23 @ 11:54) (16 - 18)  SpO2: 96% (03-21-23 @ 11:54) (95% - 99%)    Gen - NAD, Comfortable  HEENT - NCAT, EOMI, MMM, + trach with PMV  Neck - Supple, No limited ROM  Pulm - CTAB, No wheeze, No rhonchi, No crackles  Cardiovascular - RRR, S1S2  Chest - good chest expansion, good respiratory effort  Abdomen - Soft, NT/ND, +BS  Extremities - No Cyanosis, no clubbing, no edema, no calf tenderness  Neuro-     Cognitive - awake, alert, oriented to person, place, date, year, and situation.       Communication - Fluent, Comprehensible, names, follows commands, repeats      Attention: impaired      Judgement: Good evidence of judgement     Memory: Recall 3 objects immediate and 1 min later     Cranial Nerves - CN 2-12 VFF, +LR= EOMI, face is symmetric, dysarthria, soft palate elevates bilaterally      Motor -  Right hemiparesis                    LEFT    UE - 5/5                    RIGHT UE - 4+/5                    LEFT    LE -  5/5                    RIGHT LE -  5/5        Sensory - Intact to LT      Reflexes - DTR L>R     Coordination - FTN/HTS intact     Tone - normal      Gait - impaired   Psychiatric - Mood stable, Affect WNL  Skin:  all skin intact

## 2023-03-21 NOTE — DISCHARGE NOTE NURSING/CASE MANAGEMENT/SOCIAL WORK - PATIENT PORTAL LINK FT
You can access the FollowMyHealth Patient Portal offered by [facility] by registering at the following website: [email or web address]. By joining [employer]’s FollowMyHealth portal, you will also be able to view your health information using other applications (apps) compatible with our system.

## 2023-03-21 NOTE — PROGRESS NOTE ADULT - SUBJECTIVE AND OBJECTIVE BOX
O/N Events: ANA LILIA  Subjective/ROS: No complaints. Denies HA, CP, SOB, n/v, changes in bowel/urinary habits.  12pt ROS otherwise negative.    VITALS  Vital Signs Last 24 Hrs  T(C): 37.2 (21 Mar 2023 09:17), Max: 37.2 (21 Mar 2023 09:17)  T(F): 98.9 (21 Mar 2023 09:17), Max: 98.9 (21 Mar 2023 09:17)  HR: 58 (21 Mar 2023 08:25) (55 - 72)  BP: 115/55 (21 Mar 2023 08:25) (105/54 - 125/67)  BP(mean): 79 (21 Mar 2023 08:25) (75 - 92)  RR: 18 (21 Mar 2023 08:25) (16 - 18)  SpO2: 95% (21 Mar 2023 08:25) (95% - 99%)    Parameters below as of 21 Mar 2023 08:30  Patient On (Oxygen Delivery Method): tracheostomy collar        I&O's Summary    20 Mar 2023 07:01  -  21 Mar 2023 07:00  --------------------------------------------------------  IN: 480 mL / OUT: 2475 mL / NET: -1995 mL        CAPILLARY BLOOD GLUCOSE      POCT Blood Glucose.: 124 mg/dL (21 Mar 2023 08:14)  POCT Blood Glucose.: 124 mg/dL (21 Mar 2023 07:26)  POCT Blood Glucose.: 117 mg/dL (21 Mar 2023 05:59)  POCT Blood Glucose.: 111 mg/dL (20 Mar 2023 23:25)  POCT Blood Glucose.: 94 mg/dL (20 Mar 2023 17:22)  POCT Blood Glucose.: 110 mg/dL (20 Mar 2023 11:44)      PHYSICAL EXAM  Constitutional: NAD, comfortable in bed.  HEENT: PERRLA, EOMI, no conjunctival pallor or scleral icterus, MMM. NGT   Neck: Supple, no JVD trach c/d/i   Respiratory: CTA B/L. No w/r/r. Much improved compared to previous   Cardiovascular: RRR, normal S1 and S2, no m/r/g.   Gastrointestinal: +BS, soft NTND, no guarding or rebound tenderness, no palpable masses   Extremities: wwp; no cyanosis, clubbing or edema.   Vascular: Pulses equal and strong throughout.   Neurological: AAOx3, no CN deficits, strength and sensation intact throughout.   Skin: No gross skin abnormalities or rashes      MEDICATIONS  (STANDING):  atorvastatin 20 milliGRAM(s) Oral at bedtime  enoxaparin Injectable 75 milliGRAM(s) SubCutaneous every 12 hours  escitalopram 5 milliGRAM(s) Oral daily  fluticasone propionate 50 MICROgram(s)/spray Nasal Spray 1 Spray(s) Both Nostrils two times a day  insulin lispro Injectable (ADMELOG) 4 Unit(s) SubCutaneous three times a day before meals  insulin regular  human corrective regimen sliding scale   SubCutaneous every 6 hours  levETIRAcetam 750 milliGRAM(s) Oral two times a day  melatonin 5 milliGRAM(s) Oral at bedtime  metoprolol succinate ER 50 milliGRAM(s) Oral daily  sacubitril 24 mG/valsartan 26 mG 1 Tablet(s) Oral every 12 hours  sodium chloride 2 Gram(s) Oral every 6 hours    MEDICATIONS  (PRN):  acetaminophen     Tablet .. 650 milliGRAM(s) Oral every 6 hours PRN Temp greater or equal to 38C (100.4F), Mild Pain (1 - 3)  acetylcysteine 20%  Inhalation 4 milliLiter(s) Inhalation every 6 hours PRN secretions  albuterol/ipratropium for Nebulization 3 milliLiter(s) Nebulizer every 6 hours PRN Shortness of Breath and/or Wheezing  benzocaine/menthol Lozenge 1 Lozenge Oral daily PRN Sore Throat  ondansetron Injectable 4 milliGRAM(s) IV Push every 8 hours PRN Nausea and/or Vomiting  sodium chloride 3%  Inhalation 4 milliLiter(s) Inhalation every 6 hours PRN secretions      amoxicillin (Rash)      LABS                        9.7    3.77  )-----------( 121      ( 21 Mar 2023 07:09 )             29.1     03-21    127<L>  |  95<L>  |  11  ----------------------------<  116<H>  4.5   |  22  |  0.69    Ca    8.3<L>      21 Mar 2023 07:09  Phos  2.4     03-21  Mg     1.8     03-21                IMAGING/EKG/ETC: reviewed

## 2023-03-21 NOTE — CHART NOTE - NSCHARTNOTESELECT_GEN_ALL_CORE
Anti-infective approval/Event Note
Event Note
Progress Note/Event Note
3-13-23/Event Note
Afib/Event Note
Chemo/Radiation/Event Note
Endocrinology/Event Note
Event Note
Follow Up Nutrition Assessment/Nutrition Services
Progress Note/Event Note
Rehab/Event Note

## 2023-03-21 NOTE — PROGRESS NOTE ADULT - SUBJECTIVE AND OBJECTIVE BOX
Cardiology Consult    O/N:  Interval History: patient was seen and examined in am. Kahlil jaja. Reports that he is getting discharged today to rehab   Telemetry:    OBJECTIVE  Vitals:  T(C): 37.2 (03-21-23 @ 09:17), Max: 37.2 (03-21-23 @ 09:17)  HR: 58 (03-21-23 @ 11:54) (55 - 72)  BP: 111/63 (03-21-23 @ 11:54) (105/54 - 125/67)  RR: 18 (03-21-23 @ 11:54) (16 - 18)  SpO2: 96% (03-21-23 @ 11:54) (95% - 99%)  Wt(kg): --    I/O:  I&O's Summary    20 Mar 2023 07:01  -  21 Mar 2023 07:00  --------------------------------------------------------  IN: 480 mL / OUT: 2475 mL / NET: -1995 mL    21 Mar 2023 07:01  -  21 Mar 2023 13:49  --------------------------------------------------------  IN: 480 mL / OUT: 200 mL / NET: 280 mL        PHYSICAL EXAM:  Appearance: NAD.   HEENT: No pallor noted.    Cardiovascular: RRR with no murmurs.  Respiratory: Lungs CTAB. 	  Extremities: No edema b/l.  Neurologic:  Alert and awake. Moving all extremities. Following commands.   	  LABS:                        9.7    3.77  )-----------( 121      ( 21 Mar 2023 07:09 )             29.1     03-21    127<L>  |  95<L>  |  11  ----------------------------<  116<H>  4.5   |  22  |  0.69    Ca    8.3<L>      21 Mar 2023 07:09  Phos  2.4     03-21  Mg     1.8     03-21            RADIOLOGY & ADDITIONAL TESTS:  Reviewed .    MEDICATIONS  (STANDING):  atorvastatin 20 milliGRAM(s) Oral at bedtime  enoxaparin Injectable 75 milliGRAM(s) SubCutaneous every 12 hours  escitalopram 5 milliGRAM(s) Oral daily  fluticasone propionate 50 MICROgram(s)/spray Nasal Spray 1 Spray(s) Both Nostrils two times a day  insulin lispro Injectable (ADMELOG) 4 Unit(s) SubCutaneous three times a day before meals  insulin regular  human corrective regimen sliding scale   SubCutaneous every 6 hours  levETIRAcetam 750 milliGRAM(s) Oral two times a day  melatonin 5 milliGRAM(s) Oral at bedtime  metoprolol succinate ER 50 milliGRAM(s) Oral daily  sacubitril 24 mG/valsartan 26 mG 1 Tablet(s) Oral every 12 hours  sodium chloride 2 Gram(s) Oral every 6 hours    MEDICATIONS  (PRN):  acetaminophen     Tablet .. 650 milliGRAM(s) Oral every 6 hours PRN Temp greater or equal to 38C (100.4F), Mild Pain (1 - 3)  acetylcysteine 20%  Inhalation 4 milliLiter(s) Inhalation every 6 hours PRN secretions  albuterol/ipratropium for Nebulization 3 milliLiter(s) Nebulizer every 6 hours PRN Shortness of Breath and/or Wheezing  benzocaine/menthol Lozenge 1 Lozenge Oral daily PRN Sore Throat  ondansetron Injectable 4 milliGRAM(s) IV Push every 8 hours PRN Nausea and/or Vomiting  sodium chloride 3%  Inhalation 4 milliLiter(s) Inhalation every 6 hours PRN secretions

## 2023-03-21 NOTE — CHART NOTE - NSCHARTNOTEFT_GEN_A_CORE
Mr. Norwood is a 68-year-old male with a past medical history of type 2 diabetes mellitus, hyperlipidemia, iron deficiency anemia, tracheal stenosis (s/p tracheostomy) and glioblastoma (found to have a mass in 1/2022, s/p resection of left frontal enhancing tumor with GLEOLAN placed on 1/27/22 with pathology showing gliosarcoma, WHO grade IV s/p radiation and chemotherapy completed on 12/2022, repeat MRI showing recurrence of brain mass on 12/2022) who presented for further management, now s/p cerebral angiogram with Avastin treatment (3/7/23). Post-operative course was complicated by flash pulmonary edema, desaturation, new global aphasia, right sided weakness, lactic acidosis and hyperglycemia. Clinical status improved and he's now planned for discharge to rehab later today. Endocrinology following for recommendations regarding glycemic control.     CAPILLARY BLOOD GLUCOSE AND INSULIN RECEIVED  110 mg/dL (03-20 @ 11:44) ? 4 units of lispro.   94 mg/dL (03-20 @ 17:22) ? 4 units of lispro.   111 mg/dL (03-20 @ 23:25) ? Ø  117 mg/dL (03-21 @ 05:59) ? Ø  124 mg/dL (03-21 @ 07:26) ? Ø  124 mg/dL (03-21 @ 08:14) ? 4 units of lispro.   117 mg/dL (03-21 @ 11:43) ? 4 units of lispro.      # Type 2 diabetes mellitus  - Blood glucose levels remained within target overnight despite no basal insulin.   - For discharge, patient can continue with Januvia 100 mg oral daily.   - Patient can follow up at discharge with [facility] by calling [phone] to make an appointment.      Case discussed with Dr. Hoang. Primary team udpated.    Fabrice Madrid  Endocrinology Fellow Mr. Norwood is a 68-year-old male with a past medical history of type 2 diabetes mellitus, hyperlipidemia, iron deficiency anemia, tracheal stenosis (s/p tracheostomy) and glioblastoma (found to have a mass in 1/2022, s/p resection of left frontal enhancing tumor with GLEOLAN placed on 1/27/22 with pathology showing gliosarcoma, WHO grade IV s/p radiation and chemotherapy completed on 12/2022, repeat MRI showing recurrence of brain mass on 12/2022) who presented for further management, now s/p cerebral angiogram with Avastin treatment (3/7/23). Post-operative course was complicated by flash pulmonary edema, desaturation, new global aphasia, right sided weakness, lactic acidosis and hyperglycemia. Clinical status improved and he's now planned for discharge to rehab later today. Endocrinology following for recommendations regarding glycemic control.     CAPILLARY BLOOD GLUCOSE AND INSULIN RECEIVED  110 mg/dL (03-20 @ 11:44) ? 4 units of lispro.   94 mg/dL (03-20 @ 17:22) ? 4 units of lispro.   111 mg/dL (03-20 @ 23:25) ? Ø  117 mg/dL (03-21 @ 05:59) ? Ø  124 mg/dL (03-21 @ 07:26) ? Ø  124 mg/dL (03-21 @ 08:14) ? 4 units of lispro.   117 mg/dL (03-21 @ 11:43) ? 4 units of lispro.      # Type 2 diabetes mellitus  - Blood glucose levels remained within target overnight despite no basal insulin.   - For discharge, patient can continue with Januvia 100 mg oral daily.   - Patient can follow up at discharge with [facility] by calling [phone] to make an appointment.      Case discussed with Dr. Hoang. Primary team udpated.    Fabrice Madrid  Endocrinology Fellow    Attending:  Above discussed with Dr. Calderon.  Pt could probably be restarted on metformin given his improved LV function, but have decided on Januvia since he seems to need more "help" with prandial control, and I also have a concern about possible weight loss with the metformin    PTamara Hoang MD

## 2023-03-21 NOTE — PROGRESS NOTE ADULT - REASON FOR ADMISSION
cerebral angiogram with Avastin

## 2023-03-21 NOTE — PROGRESS NOTE ADULT - TIME BILLING
as noted above
Insulin adjustments
Insulin management, tube feed modification
as noted above
Evaluation of need for insulin post discharge
Insulin adjustment
Insulin adjustments
Patient seen and examined with house-staff during bedside rounds.  Resident note read, including vitals, physical findings, laboratory data, and radiological reports.   Revisions included below.  Direct personal management at bed side and extensive interpretation of the data.  Plan was outlined and discussed in details with the housestaff.  Decision making of high complexity  Action taken for acute disease activity to reflect the level of care provided:  - medication reconciliation  - review laboratory data
as noted above
acute systolic HF
acute systolic heart failure
as noted above
Basal/bolus regimen started
Insulin adjustment
Patient seen and examined with house-staff during bedside rounds.  Resident note read, including vitals, physical findings, laboratory data, and radiological reports.   Revisions included below.  Direct personal management at bed side and extensive interpretation of the data.  Plan was outlined and discussed in details with the housestaff.  Decision making of high complexity  Action taken for acute disease activity to reflect the level of care provided:  - medication reconciliation  - review laboratory data
Insulin management
Patient seen and examined with house-staff during bedside rounds.  Resident note read, including vitals, physical findings, laboratory data, and radiological reports.   Revisions included below.  Direct personal management at bed side and extensive interpretation of the data.  Plan was outlined and discussed in details with the housestaff.  Decision making of high complexity  Action taken for acute disease activity to reflect the level of care provided:  - medication reconciliation  - review laboratory data  discussed with IM
reviewing pertinent data, performing examination, counseling the patient
Patient seen and examined with house-staff during bedside rounds.  Resident note read, including vitals, physical findings, laboratory data, and radiological reports.   Revisions included below.  Direct personal management at bed side and extensive interpretation of the data.  Plan was outlined and discussed in details with the housestaff.  Decision making of high complexity  Action taken for acute disease activity to reflect the level of care provided:  - medication reconciliation  - review laboratory data  No change in echo and follow with cardiology
Patient seen and examined with house-staff during bedside rounds.  Resident note read, including vitals, physical findings, laboratory data, and radiological reports.   Revisions included below.  Direct personal management at bed side and extensive interpretation of the data.  Plan was outlined and discussed in details with the housestaff.  Decision making of high complexity  Action taken for acute disease activity to reflect the level of care provided:  - medication reconciliation  - review laboratory data
Review of hospital course, labs, vitals, medical records.   Bedside exam and interview    Discussed plan of care with primary team   Documenting the encounter

## 2023-03-21 NOTE — H&P ADULT - NSHPSOCIALHISTORY_GEN_ALL_CORE
Smoking -   EtOH -  Drugs -     Marital status:    Patient lives with family in a private home with stairs. has RW and SC from previous  PTA: Independent in ADLs and ambulation     CURRENT FUNCTIONAL STATUS  Date:   Bed Mobility:   Transfers:   Gait:   Upper Body Dressing:  Lower Body Dressing:  Toileting:  Bathing: Smoking - denies  EtOH - socially  Drugs -  denies    Patient lives with family in a private home with stairs. has RW and SC from previous  PTA: Independent in ADLs and ambulation     CURRENT FUNCTIONAL STATUS  Date: 3/20  Bed Mobility: CG, 1 person  Transfers: CG  Gait: CG, 75ft   Upper Body Dressing: CG  Lower Body Dressing: Supervision

## 2023-03-21 NOTE — PROGRESS NOTE ADULT - PROBLEM SELECTOR PROBLEM 3
Acute hyponatremia
Hyponatremia
Acute hyponatremia
Hyponatremia
Acute hyponatremia
Hyponatremia
Hyponatremia

## 2023-03-21 NOTE — H&P ADULT - ASSESSMENT
ASSESSMENT/PLAN  This is a 67 YO right handed male with PMH of DM II, HLD, JAGDISH, hyponatremia, tracheal stenosis s/p tracheostomy, and glioblastoma who presented to [facility] on 3/7  for cerebral angiogram with Avastin treatment. Course s/f  metabolic acidosis, desaturation requiring vent support, PNA, stress induced cardiomyopathy -Takotsubo cardiomyopathy, thrombocytopenia, chronic DVT, and dysphagia. Patient now with gait Instability, ADL impairments and Functional impairments.    #GBM  - s/p cerebral angiogram with Avastin treatment  - Start Comprehensive Rehab Program: PT/OT/ST, 3hours daily and 5 days weekly  - PT: Focused on improving strength, endurance, coordination, balance, functional mobility, and transfers  - OT: Focused on improving strength, fine motor skills, coordination, posture and ADLs.    - ST: to diagnose and treat deficits in swallowing, cognition and communication.   - Keppra 750mg BID    #Respiratory Failure  - metabolic acidosis  - tracheostomy +  passy hai valve  - trach exchanged to 6 shiley cuffless 3/16 by ENT  - mucomyst, sodium inh    #Takotsubo Cardiomyopathy  - Entresto 24-26mg q 12 hrs  - Metoprolol XL 50mg daily  - EF 20%    #HLD  - Lipitor 20mg daily    #Hyponatremia  - Sodium tabs 2gram Q 6 hrs     #PNA  -S/p Cefepime 3/13-3/18    #DM II  - ISS and FS  - Admelog and Lantus  - A1c ..... on     #Pain management  - Tylenol PRN    #DVT  - Chronic R IM calf DVT  - Lovenox bid    #GI ppx  - Protonix 40mg    #Bowel Regimen  -  miralax PRN  - monitor    #Bladder management  - BS on admission, and q 8 hours (SC if > 400)  - Monitor UO    #FEN   - Diet: consistent carb with evening snacks    #Skin:  - Skin on admission: ***  - Pressure injury/Skin: Turn Q2hrs while in bed, OOB to Chair, PT/OT     #Dysphagia    - SLP: evaluation and treatment    #Sleep:   - Maintain quiet hours and low stim environment.  - Melatonin PRN to maximize participation in therapy during the day.     #Precaution  - Fall, Aspiration, cardiac, Seizure    #GOC  CODE STATUS: FULL CODE    Outpatient Follow-up (Specialty/Name of physician):    Trevin Ho)  Neurosurgery  [address], [address] [place] [address]  Phone: [phone]  Fax: [phone]    Chapin Hoang)  EndocrinologyMetabDiabetes; Internal Medicine  [address] [address]  Phone: [phone]  Fax: [phone]    Ann Guerrero)  Cardiovascular Disease; Internal Medicine  [address], [address]  [place] [address]  Phone: [phone]  Fax: [phone]    Lizzy Houser)  HematologyOncology; Internal Medicine  [address] [address]  Phone: [phone]  Fax: [phone]    Markel Lopez)  Otolaryngology  [address] [place] [address]  Phone: [phone]  Fax: [phone]    Isabel Chiang  CARDIOLOGY  [address] [address]  Phone: [phone]  Fax: [phone]    Hao Caab  [facility]  OTOLARYNG [address] [facility]  Scheduled Appointment: 04/18/2023    Vaughn Mitchell  [facility]  CARDIOLOGY [address]   Scheduled Appointment: 05/16/2023      MEDICAL PROGNOSIS: GOOD            REHAB POTENTIAL: GOOD             ESTIMATED DISPOSITION: HOME WITH HOME CARE            ELOS: 10-14 Days   EXPECTED THERAPY:     P.T. 1hr/day       O.T. 1hr/day      S.L.P. 1hr/day     P&O Unnecessary     EXP FREQUENCY: 5 days per 7 day period     PRESCREEN COMPARISON:   I have reviewed the prescreen information and I have found no relevant changes between the preadmission screening and my post admission evaluation     RATIONALE FOR INPATIENT ADMISSION - Patient demonstrates the following: (check all that apply)  [X] Medically appropriate for rehabilitation admission  [X] Has attainable rehab goals with an appropriate initial discharge plan  [X] Has rehabilitation potential (expected to make a significant improvement within a reasonable period of time)   [X] Requires close medical management by a rehab physician, rehab nursing care, Hospitalist and comprehensive interdisciplinary team (including PT, OT, & or SLP, Prosthetics and Orthotics)   ASSESSMENT/PLAN  This is a 69 YO right handed male with PMH of DM II, HLD, JAGDISH, hyponatremia, tracheal stenosis s/p tracheostomy, and glioblastoma who presented to [facility] on 3/7  for cerebral angiogram with Avastin treatment. Course s/f  metabolic acidosis, desaturation requiring vent support, PNA, stress induced cardiomyopathy -Takotsubo cardiomyopathy, thrombocytopenia, chronic DVT, and dysphagia. Patient now with gait Instability, ADL impairments and Functional impairments.    #GBM  - s/p cerebral angiogram with Avastin treatment  - Start Comprehensive Rehab Program: PT/OT/ST, 3hours daily and 5 days weekly  - PT: Focused on improving strength, endurance, coordination, balance, functional mobility, and transfers  - OT: Focused on improving strength, fine motor skills, coordination, posture and ADLs.    - ST: to diagnose and treat deficits in swallowing, cognition and communication.   - Keppra 750mg BID    #Respiratory Failure  - metabolic acidosis  - tracheostomy +  passy hai valve  - trach exchanged to 6 shiley cuffless 3/16 by ENT  - mucomyst, sodium inh    #Takotsubo Cardiomyopathy  - Entresto 24-26mg q 12 hrs  - Metoprolol XL 50mg daily  - EF 20%    #HLD  - Lipitor 20mg daily    #Hyponatremia  - Sodium tabs 2gram Q 6 hrs     #PNA  -S/p Cefepime 3/13-3/18    #DM II  - ISS and FS  - Admelog and Lantus  - A1c 5.8 on 3/8    #Pain management  - Tylenol PRN    #DVT  - Chronic R IM calf DVT  - Lovenox BID    #GI ppx  - Protonix 40mg    #Bowel Regimen  -  miralax PRN  - monitor    #Bladder management  - BS on admission, and q 8 hours (SC if > 400)  - Monitor UO    #FEN   - Diet: consistent carb with evening snacks    #Skin:  - Skin on admission: Intact  - Pressure injury/Skin: Turn Q2hrs while in bed, OOB to Chair, PT/OT     #Dysphagia    - SLP: evaluation and treatment    #Sleep:   - Maintain quiet hours and low stim environment.  - Melatonin PRN to maximize participation in therapy during the day.     #Precaution  - Fall, Aspiration, cardiac, Seizure    #GOC  CODE STATUS: FULL CODE    Outpatient Follow-up (Specialty/Name of physician):    Trevin Ho)  Neurosurgery  [address], [address] [place] [address]  Phone: [phone]  Fax: [phone]    Chapin Hoang)  EndocrinologyMetabDiabetes; Internal Medicine  [address] [address]  Phone: [phone]  Fax: [phone]    Ann Guerrero)  Cardiovascular Disease; Internal Medicine  [address], [address]  [place] [address]  Phone: [phone]  Fax: [phone]    Lizzy Houser)  HematologyOncology; Internal Medicine  [address] [address]  Phone: [phone]  Fax: [phone]    Markel Lopez)  Otolaryngology  [address] [place] [address]  Phone: [phone]  Fax: [phone]    Isabel Chiang  CARDIOLOGY  [address] [address]  Phone: [phone]  Fax: [phone]    Hao Caba  [facility]  OTOLARYNG [address] [facility]  Scheduled Appointment: 04/18/2023    Vaughn Mitchell  [facility]  CARDIOLOGY [address]   Scheduled Appointment: 05/16/2023      MEDICAL PROGNOSIS: GOOD            REHAB POTENTIAL: GOOD             ESTIMATED DISPOSITION: HOME WITH HOME CARE            ELOS: 10-14 Days   EXPECTED THERAPY:     P.T. 1hr/day       O.T. 1hr/day      S.L.P. 1hr/day     P&O Unnecessary     EXP FREQUENCY: 5 days per 7 day period     PRESCREEN COMPARISON:   I have reviewed the prescreen information and I have found no relevant changes between the preadmission screening and my post admission evaluation     RATIONALE FOR INPATIENT ADMISSION - Patient demonstrates the following: (check all that apply)  [X] Medically appropriate for rehabilitation admission  [X] Has attainable rehab goals with an appropriate initial discharge plan  [X] Has rehabilitation potential (expected to make a significant improvement within a reasonable period of time)   [X] Requires close medical management by a rehab physician, rehab nursing care, Hospitalist and comprehensive interdisciplinary team (including PT, OT, & or SLP, Prosthetics and Orthotics)

## 2023-03-21 NOTE — PROGRESS NOTE ADULT - PROBLEM SELECTOR PLAN 3
130s this admission now 124. Asymptomatic, strength and cognition intact. Euvolemic appearing. Making adequate UOP. Had acutely Na >119 (iatrogenic due to lasix push). Recovered with salt tabs. Work up c/w SIADH  C/w salt tabs, water restriction
Na increased with diuresis  on salt tabs,
Na increased with diuresis
130s this admission now 124. Asymptomatic, strength and cognition intact. Euvolemic appearing. Making adequate UOP.   -Workup: TSH, serum osmo, urine osmo, urine lytes, Fe Ur repeat BMP today
Na increased with diuresis
downtrended over weekend, now improving, continue to trend  likely related to IV Lasix use, expect to stabilize
downtrended over weekend, now improving, continue to trend

## 2023-03-21 NOTE — PROGRESS NOTE ADULT - PROBLEM SELECTOR PLAN 6
Per cards: metoprolol, entresto, lasix PRN
Per cards: metoprolol, entresto, lasix PRN
metoprolol, entresto, lasix PRN  monitor lasix use with sodium as drops can cause worsening of edema
metoprolol, entresto, lasix PRN  monitor lasix use with sodium as drops can cause worsening of edema

## 2023-03-21 NOTE — PROGRESS NOTE ADULT - PROBLEM SELECTOR PLAN 1
he is improving with decrease in oxygen requirement and improvement in gas exchange.  CXR is improving less congested .  Off pressor and there was no need for ionotrops.  Diuresis.  Follow on repeat ECHO.  On PSV with adequate TV
he is improving with decrease in oxygen requirement and improvement in gas exchange.  CXR is improving.  Still on pressor and there was no need for ionotrops.  Diuresis.
he is improving with decrease in oxygen requirement and improvement in gas exchange.  CXR is improving. on BB and amiodarone.  Off diuretic.  He is on T collar and continue as tolerated.  He participated in PT.  Dysphagia evaluation and he is eating.  He is on t collar and chest exam improved.  Increase activity
he is improving with decrease in oxygen requirement and improvement in gas exchange.  CXR is improving. on BB and amiodarone.  Off diuretic.  He is on T collar and continue as tolerated.  He participated in PT.  Dysphagia evaluation
Trached 2/2 tracheal stenosis. Had flash pulm edema this admission. On 3/18 episodes of small hemoptysis, CXR with diffuse reticular markings since previous s/o fluid overload. Trial of Lasix 20mg IVP. Per cards may require lasix PRN
Trached 2/2 tracheal stenosis. Had flash pulm edema this admission. On 3/18 episodes of small hemoptysis, CXR with diffuse reticular markings since previous s/o fluid overload. Trial of Lasix 20mg IVP. Per cards may require lasix PRN
he is improving with decrease in oxygen requirement and improvement in gas exchange.  CXR is improving. on BB and amiodarone.  Off diuretic.  He is on T collar and continue as tolerated.  He participated in PT.  Dysphagia evaluation and he is eating.  He is on t collar and chest exam improved.  Increase activity
he is improving with decrease in oxygen requirement and improvement in gas exchange.  CXR is improving. on BB and amiodarone. He is on T collar and continue as tolerated.  He participated in PT.  tolerating PO
he is improving with decrease in oxygen requirement and improvement in gas exchange.  CXR is improving. on BB and amiodarone.  Off diuretic.  He is on T collar and continue as tolerated.
Resolved
he is improving with decrease in oxygen requirement and improvement in gas exchange.  CXR is improving. on BB and amiodarone.  Off diuretic.  He is on T collar and continue as tolerated.  He participated in PT.  Dysphagia evaluation and he is eating.  He is on t collar and chest exam improved.  Increase activity.  he is stable and pulmonary wise stable for DC
Trached 2/2 tracheal stenosis. Had flash pulm edema this admission. On 3/18 episodes of small hemoptysis, CXR with diffuse reticular markings since previous s/o fluid overload. Trial of Lasix 20mg IVP. Per cards may require lasix PRN    Pending repeat echocardiogram    Currently, Resolved

## 2023-03-22 NOTE — CHART NOTE - NSCHARTNOTEFT_GEN_A_CORE
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      Type 2 diabetes mellitus      Hyperlipidemia      Iron deficiency anemia      Nephrolithiasis      Thrombocytopenia      Hyponatremia      BPH (benign prostatic hyperplasia)      S/P craniotomy      H/O tracheostomy          Based upon consideration of the patient's impairments, functional status, complicating conditions and any other contributing factors and after information garnered from the assessments of all therapy disciplines involved in treating the patient and other pertinent clinicians:    INTERDISCIPLINARY REHABILITATION INTERVENTIONS:    [ X  ] Transfer Training  [X   ] Bed Mobility  [ X  ] Therapeutic Exercise  [ X  ] Balance/Coordination Exercises  [ X  ] Locomotion retraining  [ X  ] Stairs  [ X  ] Functional Transfer Training  [ X  ] Bowel/Bladder program  [  X ] Pain Management  [ X  ] Skin/Wound Care  [X   ] Visual/Perceptual Training  [X   ] Therapeutic Recreation Activities  [  X ] Neuromuscular Re-education  [  X ] Activities of Daily Living  [ X  ] Speech Exercise  [   ] Swallowing Exercises  [   ] Vital Stim  [   ] Dietary Supplements  [ X  ] Calorie Count  [X   ] Cognitive Exercises  [  X ] Congnitive/Linguistic Treatment  [   ] Behavior Program  [  X ] Neuropsych Therapy  [ X  ] Patient/Family Counseling  [ X  ] Family Training  [ X  ] Community Re-entry  [ X  ] Orthotic Evaluation  [   ] Prosthetic Eval/Training    MEDICAL PROGNOSIS:  Good     REHAB POTENTIAL:  Good     EXPECTED DAILY THERAPY:         PT: 1hr/day       OT: 1hr/day       ST: 1hr/day        EXPECTED INTENSITY OF PROGRAM:  3 hrs/day    EXPECTED FREQUENCY OF PROGRAM:  5 days/week    ESTIMATED LOS:  10-14 days    ESTIMATED DISPOSITION:  home    INTERDISCIPLINARY FUNCTIONAL OUTCOMES/GOALS:           Gait/Mobility: Mod I       Transfers: Supervision       ADLs: Supervision       Functional Transfers: Min Assist       Medication Management: Independent       Communication: Supervision       Cognitive: Min Assist       Dysphagia: Supervision       Bladder: Supervision       Bowel: Supervision 1 = assistive equipment/cane

## 2023-03-22 NOTE — CONSULT NOTE ADULT - ASSESSMENT
Assessment  Chronic respiratory failure s/p Trach due to tracheal stenosis  Glioblastoma s/p Resection  Underlying Hypertension, DM2, High chol, BPH    Pulmonary  Trach appears to be chronic   Would need either thoracic vs ENT to state ok to work towards decanulation  as able to phonate can consider PMV valve and tolerate to when awake  would not CAP patient till ok by ENT  d/w Primary team ACP  Trach care  oob, PT  can monitor on Room air   Rest of care as per primary team

## 2023-03-22 NOTE — CONSULT NOTE ADULT - ASSESSMENT
69 yo M with PMH of Hypertension, Type 2 Diabetes Mellitus, Hyperlipidemia Anemia, tracheal stenosis s/p tracheostomy, glioblastoma who presented to [facility] 3/7 for cerebral angiogram with Avastin treatment. Course complicated by metabolic acidosis, acute hypoxic respiratory failure requiring mechanical ventilation, takotsubo cardiomyopathy, A-fib, chronic DVT, dysphagia. Weaned off vent. Now admitted to [facility] for rehab.    # Glioblastoma   - PT/OT/ST per PMR  - Pain management per PMR  - Keppra 750mg BID    # Stress induced cardiomyopathy (Takotsubo Cardiomyopathy)  - EF improved per echo at [facility] - most recently 35-40%  - euvolemic on exam - continue lasix prn based on volume statis  - GDMT - continue entrsto 24/26mg bid, Toprol 50mg daily with holding parameters  67 yo M with PMH of Hypertension, Type 2 Diabetes Mellitus, Hyperlipidemia Anemia, tracheal stenosis s/p tracheostomy, glioblastoma who presented to [facility] 3/7 for cerebral angiogram with Avastin treatment. Course complicated by metabolic acidosis, acute hypoxic respiratory failure requiring mechanical ventilation, takotsubo cardiomyopathy, A-fib, chronic DVT, dysphagia. Weaned off vent. Now admitted to [facility] for rehab.    # Glioblastoma   - PT/OT/ST per PMR  - Pain management per PMR  - Keppra 750mg BID    # Stress induced cardiomyopathy (Takotsubo Cardiomyopathy)  - EF improved per echo at [facility] - most recently 35-40%  - euvolemic on exam - continue lasix prn based on volume statis  - GDMT - continue entrsto 24/26mg bid, Toprol 50mg daily with holding parameters     # Hyponatremia   - on NaCl tab 2g q6h. taper down as tolerated gradually and cautiously given low EF    # Type 2 Diabetes Mellitus  - HbA1C 5.8%  - Hold home oral med - Januvia  - FS and JANIE    # DVT  - chronic   - Lovenox BID    DVT Prophylaxis:  - Lovenox     Thank you for involving us in the care of this patient. Medicine service will follow.

## 2023-03-22 NOTE — PROVIDER CONTACT NOTE (MEDICATION) - SITUATION
patient blood pressure 102/60, HR 61. Scheduled for 0600 Entresto and Metoprolol ER.  Dr. Carcamo made aware of vital signs.

## 2023-03-23 NOTE — PROGRESS NOTE ADULT - ASSESSMENT
69 YO right handed male with PMH of DM II, HLD, JAGDISH, hyponatremia, tracheal stenosis s/p tracheostomy, and glioblastoma who presented to [facility] on 3/7  for cerebral angiogram with Avastin treatment. Course s/f  metabolic acidosis, desaturation requiring vent support, PNA, stress induced cardiomyopathy -Takotsubo cardiomyopathy, thrombocytopenia, chronic DVT, and dysphagia. Patient now with gait Instability, ADL impairments and Functional impairments.    #GBM  - s/p cerebral angiogram with Avastin treatment  - Start Comprehensive Rehab Program: PT/OT/ST, 3hours daily and 5 days weekly  - PT: Focused on improving strength, endurance, coordination, balance, functional mobility, and transfers  - OT: Focused on improving strength, fine motor skills, coordination, posture and ADLs.    - ST: to diagnose and treat deficits in swallowing, cognition and communication.   - Keppra 750mg BID    #Respiratory Failure  - metabolic acidosis  - tracheostomy +  PMV  - trach exchanged to 6 shiley cuffless 3/16 by ENT  - mucomyst, sodium [id number]    - Pulm consult appreciated - rec ENT   - ENT Consult appreciated - outpatient follow up     #Takotsubo Cardiomyopathy  - Entresto 24-26mg q 12 hrs  - Metoprolol XL 50mg daily  - EF 20%    #HLD  - Lipitor 20mg daily    #Hyponatremia  - Sodium tabs 2gram Q 6 hrs   - 130 (3/23)    #PNA  -S/p Cefepime 3/13-3/18    #DM II  - ISS and FS - DC  - A1c 5.8 on 3/8  - FS controlled < 150 (3/23) - DC FS, monitor via labs    #Pain management  - Tylenol PRN    #DVT  - Chronic R IM calf DVT  - Lovenox BID    #GI ppx  - Protonix 40mg    #Bowel Regimen  - miralax PRN  - monitor    #Bladder management  - BS on admission, and q 8 hours (SC if > 400) - <300 PVR, have not required straight cath  - Toileting program  - Monitor UO    #FEN   - Diet: consistent carb with evening snacks    #Skin:  - Skin on admission: Intact  - Pressure injury/Skin: Turn Q2hrs while in bed, OOB to Chair, PT/OT     #Dysphagia    - SLP: evaluation and treatment    #Sleep:   - Maintain quiet hours and low stim environment.  - Melatonin PRN to maximize participation in therapy during the day.     #Precaution  - Fall, Aspiration, cardiac, Seizure    #GOC  CODE STATUS: FULL CODE    Outpatient Follow-up (Specialty/Name of physician):    Trevin Ho)  Neurosurgery  [address], [address] [place] [address]  Phone: [phone]  Fax: [phone]    Chapin Hoang)  EndocrinologyMetabDiabetes; Internal Medicine  [address] [address]  Phone: [phone]  Fax: [phone]    Ann Guerrero)  Cardiovascular Disease; Internal Medicine  [address], [address]  [place] [address]  Phone: [phone]  Fax: [phone]    Lizzy Houser)  HematologyOncology; Internal Medicine  [address] [address]  Phone: [phone]  Fax: [phone]    Markel Lopez)  Otolaryngology  [address] [place] [address]  Phone: [phone]  Fax: [phone]    Isabel Chiang  CARDIOLOGY  [address] [place] [address]  Phone: [phone]  Fax: [phone]    Hao Caba  [facility]  OTOLARYNG [address] [facility]  Scheduled Appointment: 04/18/2023    Vaughn Mitchell  [facility]ician [facility]  CARDIOLOGY [address]   Scheduled Appointment: 05/16/2023

## 2023-03-23 NOTE — CONSULT NOTE ADULT - PROBLEM SELECTOR RECOMMENDATION 2
- Patient found to have recurrent tumor  - Patient also had respiratory edema after recent treatment  - His case was also discussed with Dr. Hao aCba  - Due to the need of further treatment for patient's recurrence, discussed with patient that it would be to his best interest to keep the tracheostomy until any further oncological or surgical treatment is necessary.  - Continue use of PMV  - Follow up with Dr. Caba 2 weeks after discharge.  [phone]

## 2023-03-23 NOTE — PROGRESS NOTE ADULT - SUBJECTIVE AND OBJECTIVE BOX
Patient is a 68y old  Male who presents with a chief complaint of Glioblastoma (22 Mar 2023 13:33)    HPI:  This is a 67 YO right handed male with PMH of DM II, HLD, JAGDISH, hyponatremia, tracheal stenosis s/p tracheostomy, and glioblastoma who presented to [facility] on 3/7  for cerebral angiogram with Avastin treatment.    Per daughter, symptoms started in 2022 when patient had onset of confusion, dizziness, and speech difficulty while in [place] and was diagnosed with brain mass. Pt evaluated upon return to the [place] at [facility] ED with CT head revealing left frontal brain mass. Pt underwent resection of left frontal enhancing tumor with GLEOLAN placed 2022. Pathology showed gliosarcoma, WHO grade IV, IDH wild-type, EGFR non amplified, MGMT promotor methylated. Pt was discharged to rehab and underwent radiation treatment and Temodar chemotherapy (completed ). MRI  showed recurrence of brain mass.    The patient presented to [facility] on 3/7 referred by Dr. Mackenzie for trial participation cerebral angiogram with Avastin treatment. Post op in cath lab hypertensive, +flash pulmonary edema with desaturation, given diuretics. He developed new global aphasia and right sided weakness on 3/8. Hospital course further significant for metabolic acidosis, desaturation requiring vent support, PNA, stress induced cardiomyopathy (Takotsubo cardiomyopathy, reduced but stable EF at 20%), thrombocytopenia, chronic DVT. NGT placed and removed on 3/19, PMV per s&s.    Patient was evaluated by PM&R and therapy for functional deficits, gait/ADL impairments and acute rehabilitation was recommended. Patient was medically optimized for discharge to [facility] IRU on 3/21/23.      PAST MEDICAL & SURGICAL HISTORY:  Hypertension  Glioblastoma - Grade 4 Gliosarcoma dx 2022  Type 2 diabetes mellitus  Hyperlipidemia  Iron deficiency anemia  Nephrolithiasis  Thrombocytopenia  Hyponatremia  BPH (benign prostatic hyperplasia)  S/P craniotomy  H/O tracheostomy      MEDICATIONS  (STANDING):  atorvastatin 20 milliGRAM(s) Oral at bedtime  dextrose 5%. 1000 milliLiter(s) (50 mL/Hr) IV Continuous <Continuous>  dextrose 5%. 1000 milliLiter(s) (100 mL/Hr) IV Continuous <Continuous>  dextrose 50% Injectable 25 Gram(s) IV Push once  dextrose 50% Injectable 12.5 Gram(s) IV Push once  dextrose 50% Injectable 25 Gram(s) IV Push once  enoxaparin Injectable 70 milliGRAM(s) SubCutaneous every 12 hours  escitalopram 5 milliGRAM(s) Oral daily  fluticasone propionate 50 MICROgram(s)/spray Nasal Spray 1 Spray(s) Both Nostrils two times a day  glucagon  Injectable 1 milliGRAM(s) IntraMuscular once  insulin lispro (ADMELOG) corrective regimen sliding scale   SubCutaneous three times a day before meals  insulin lispro (ADMELOG) corrective regimen sliding scale   SubCutaneous at bedtime  levETIRAcetam 750 milliGRAM(s) Oral two times a day  melatonin 6 milliGRAM(s) Oral at bedtime  metoprolol succinate ER 50 milliGRAM(s) Oral daily  pantoprazole    Tablet 40 milliGRAM(s) Oral before breakfast  sacubitril 24 mG/valsartan 26 mG 1 Tablet(s) Oral every 12 hours  sodium chloride 2 Gram(s) Oral every 6 hours    MEDICATIONS  (PRN):  acetaminophen     Tablet .. 650 milliGRAM(s) Oral every 6 hours PRN Temp greater or equal to 38C (100.4F), Mild Pain (1 - 3)  acetylcysteine 20%  Inhalation 4 milliLiter(s) Inhalation every 6 hours PRN secretions  benzocaine/menthol Lozenge 1 Lozenge Oral daily PRN Sore Throat  dextrose Oral Gel 15 Gram(s) Oral once PRN Blood Glucose LESS THAN 70 milliGRAM(s)/deciliter  polyethylene glycol 3350 17 Gram(s) Oral daily PRN Constipation  sodium chloride 3%  Inhalation 4 milliLiter(s) Inhalation every 6 hours PRN secretions      Allergies  amoxicillin (Rash)  Intolerances      PHYSICAL EXAM  68y  Vital Signs Last 24 Hrs  T(C): 36.6 (23 Mar 2023 07:39), Max: 37.1 (22 Mar 2023 19:46)  T(F): 97.8 (23 Mar 2023 07:39), Max: 98.7 (22 Mar 2023 19:46)  HR: 56 (23 Mar 2023 07:39) (56 - 68)  BP: 105/61 (23 Mar 2023 07:39) (92/51 - 109/63)  RR: 15 (23 Mar 2023 07:39) (15 - 15)  SpO2: 98% (23 Mar 2023 09:24) (95% - 98%)    Daily     Daily Weight in k.4 (22 Mar 2023 22:17)      RECENT LABS:                          9.9    3.69  )-----------( 115      ( 23 Mar 2023 05:25 )             29.5     0323    130<L>  |  97  |  11  ----------------------------<  126<H>  3.9   |  28  |  0.94    Ca    8.3<L>      23 Mar 2023 05:25    TPro  6.4  /  Alb  2.8<L>  /  TBili  0.4  /  DBili  x   /  AST  26  /  ALT  43  /  AlkPhos  74  03-23    LIVER FUNCTIONS - ( 23 Mar 2023 05:25 )  Alb: 2.8 g/dL / Pro: 6.4 g/dL / ALK PHOS: 74 U/L / ALT: 43 U/L / AST: 26 U/L / GGT: x               CAPILLARY BLOOD GLUCOSE  POCT Blood Glucose.: 147 mg/dL (23 Mar 2023 12:10)  POCT Blood Glucose.: 118 mg/dL (23 Mar 2023 07:51)  POCT Blood Glucose.: 144 mg/dL (22 Mar 2023 21:10)  POCT Blood Glucose.: 104 mg/dL (22 Mar 2023 16:39)        Review of Systems:   · Additional ROS	Patient seen and evaluated at bedside.  Sleeping well.  Denies fever, chills, CP, SOB, HA, dizziness, nausea, abd pain, or urinary symptoms.  Updated in regards to pulm rec and ENT consult.  Reviewed risks/benefits of trach.  Patient verbalized understanding.  Tolerating therapy - was enforced when to use bedside PMV vs capping.    Physical Exam:   · Constitutional	well-groomed  · Eyes	PERRL; EOMI; conjunctiva clear  · ENMT Comments	+trach, capped  · Respiratory	clear to auscultation bilaterally; no wheezes; no rales; no rhonchi  · Cardiovascular	regular rate and rhythm; S1 S2 present; no murmur  · Gastrointestinal	soft; nontender; nondistended; normal active bowel sounds  · Neurological	cranial nerves II-XII intact; sensation intact; responds to verbal commands  · Motor	5/5 to all extremities  coordination intact  sensation intact  · Psychiatric	normal affect; alert and oriented x3; normal behavior

## 2023-03-23 NOTE — CONSULT NOTE ADULT - SUBJECTIVE AND OBJECTIVE BOX
Patient is a 68y old  Male who presents with a chief complaint of Glioblastoma (23 Mar 2023 12:35)      HPI:  This is a 69 YO right handed male with PMH of DM II, HLD, JAGDISH, hyponatremia, tracheal stenosis s/p tracheostomy, and glioblastoma who presented to [facility] on 3/7  for cerebral angiogram with Avastin treatment.    Per daughter, symptoms started in January 2022 when patient had onset of confusion, dizziness, and speech difficulty while in [place] and was diagnosed with brain mass. Pt evaluated upon return to the [place] at [facility] ED with CT head revealing left frontal brain mass. Pt underwent resection of left frontal enhancing tumor with GLEOLAN placed 1/27/2022. Pathology showed gliosarcoma, WHO grade IV, IDH wild-type, EGFR non amplified, MGMT promotor methylated. Pt was discharged to rehab and underwent radiation treatment and Temodar chemotherapy (completed 12/22). MRI 12/22 showed recurrence of brain mass.    The patient presented to [facility] on 3/7 referred by Dr. Mackenzie for trial participation cerebral angiogram with Avastin treatment. Post op in cath lab hypertensive, +flash pulmonary edema with desaturation, given diuretics. He developed new global aphasia and right sided weakness on 3/8. Hospital course further significant for metabolic acidosis, desaturation requiring vent support, PNA, stress induced cardiomyopathy (Takotsubo cardiomyopathy, reduced but stable EF at 20%), thrombocytopenia, chronic DVT. NGT placed and removed on 3/19, PMV per s&s.    Patient was evaluated by PM&R and therapy for functional deficits, gait/ADL impairments and acute rehabilitation was recommended. Patient was medically optimized for discharge to [facility] IRU on 3/21/23.     (21 Mar 2023 13:09)      Interval Events:  Called to see and evaluate Mr. Norwood for tracheostomy management.  Patient is family to me and to the ENT service.  He was diagnosed with a left frontal glioblastoma on January 2022 and developed a tracheal stenosis after surgical treatment.  He is post tracheal balloon dilation on March 3, 2022 and tracheostomy on March 17, 2022 by Dr. Radha Garland of Thoracic Surgery.  He has been under the care of Dr. Hao Caba of Otolaryngology - Head and Neck at [facility] as outpatient for tracheostomy maintenance.  It was decided to keep the tracheostomy based on his oncologic outlook, especially if he requires further treatment or surgical management.  He was last seen on January 2023 in Dr. Caba's office.  On December 2022, he was found to have a recurrence of the brain mass.   On March 7, 2023 he participated in a trial which resulted with post operative hypertension, pulmonary edema and weakness.  Currently, he is doing well and speaks well with a passe hai valve.  He has no pain and is tolerating a regular diet.    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  Type 2 diabetes mellitus  Hyperlipidemia  Iron deficiency anemia  Nephrolithiasis  Thrombocytopenia  Hyponatremia  BPH (benign prostatic hyperplasia)  S/P craniotomy  H/O tracheostomy    Allergies:  amoxicillin (Rash)    Social History:  Smoking - denies  EtOH - socially  Drugs -  denies    FAMILY HISTORY:  FH: diabetes mellitus (Father, Mother)  FH: hyperlipidemia (Father)  FH: hypertension (Father, Mother)    REVIEW OF SYSTEMS:     CONSTITUTIONAL: No weakness, fevers or chills     EYES/ENT: No visual changes;  No vertigo or throat pain      NECK: No pain or stiffness     RESPIRATORY: No cough, wheezing, hemoptysis; No shortness of breath     CARDIOVASCULAR: No chest pain or palpitations     GASTROINTESTINAL: No abdominal or epigastric pain. No nausea, vomiting, or hematemesis; No diarrhea or constipation. No melena or hematochezia.     GENITOURINARY: No dysuria, frequency or hematuria     NEUROLOGICAL: No numbness or weakness     SKIN: No itching, burning, rashes, or lesions   All other review of systems is negative unless indicated above.    MEDICATIONS  (STANDING):  atorvastatin 20 milliGRAM(s) Oral at bedtime  dextrose 5%. 1000 milliLiter(s) (50 mL/Hr) IV Continuous <Continuous>  dextrose 5%. 1000 milliLiter(s) (100 mL/Hr) IV Continuous <Continuous>  dextrose 50% Injectable 25 Gram(s) IV Push once  dextrose 50% Injectable 12.5 Gram(s) IV Push once  dextrose 50% Injectable 25 Gram(s) IV Push once  enoxaparin Injectable 70 milliGRAM(s) SubCutaneous every 12 hours  escitalopram 5 milliGRAM(s) Oral daily  fluticasone propionate 50 MICROgram(s)/spray Nasal Spray 1 Spray(s) Both Nostrils two times a day  glucagon  Injectable 1 milliGRAM(s) IntraMuscular once  levETIRAcetam 750 milliGRAM(s) Oral two times a day  melatonin 6 milliGRAM(s) Oral at bedtime  metoprolol succinate ER 50 milliGRAM(s) Oral daily  pantoprazole    Tablet 40 milliGRAM(s) Oral before breakfast  sacubitril 24 mG/valsartan 26 mG 1 Tablet(s) Oral every 12 hours  sodium chloride 2 Gram(s) Oral every 6 hours    MEDICATIONS  (PRN):  acetaminophen     Tablet .. 650 milliGRAM(s) Oral every 6 hours PRN Temp greater or equal to 38C (100.4F), Mild Pain (1 - 3)  acetylcysteine 20%  Inhalation 4 milliLiter(s) Inhalation every 6 hours PRN secretions  benzocaine/menthol Lozenge 1 Lozenge Oral daily PRN Sore Throat  dextrose Oral Gel 15 Gram(s) Oral once PRN Blood Glucose LESS THAN 70 milliGRAM(s)/deciliter  polyethylene glycol 3350 17 Gram(s) Oral daily PRN Constipation  sodium chloride 3%  Inhalation 4 milliLiter(s) Inhalation every 6 hours PRN secretions                            9.9    3.69  )-----------( 115      ( 23 Mar 2023 05:25 )             29.5     03-23    130<L>  |  97  |  11  ----------------------------<  126<H>  3.9   |  28  |  0.94    Ca    8.3<L>      23 Mar 2023 05:25    TPro  6.4  /  Alb  2.8<L>  /  TBili  0.4  /  DBili  x   /  AST  26  /  ALT  43  /  AlkPhos  74  03-23    I&O's Detail    22 Mar 2023 07:01  -  23 Mar 2023 07:00  --------------------------------------------------------  IN:  Total IN: 0 mL    OUT:    Voided (mL): 850 mL  Total OUT: 850 mL    Total NET: -850 mL        Vital Signs Last 24 Hrs  T(C): 36.6 (23 Mar 2023 07:39), Max: 37.1 (22 Mar 2023 19:46)  T(F): 97.8 (23 Mar 2023 07:39), Max: 98.7 (22 Mar 2023 19:46)  HR: 56 (23 Mar 2023 07:39) (56 - 68)  BP: 105/61 (23 Mar 2023 07:39) (92/51 - 109/63)  BP(mean): --  RR: 15 (23 Mar 2023 07:39) (15 - 15)  SpO2: 98% (23 Mar 2023 09:24) (95% - 98%)    Parameters below as of 23 Mar 2023 07:39  Patient On (Oxygen Delivery Method): room air      CBC Full  -  ( 23 Mar 2023 05:25 )  WBC Count : 3.69 K/uL  RBC Count : 2.99 M/uL  Hemoglobin : 9.9 g/dL  Hematocrit : 29.5 %  Platelet Count - Automated : 115 K/uL  Mean Cell Volume : 98.7 fl  Mean Cell Hemoglobin : 33.1 pg  Mean Cell Hemoglobin Concentration : 33.6 gm/dL      PHYSICAL EXAM:     Constitutional Normal: well nourished, well developed, non-dysmorphic, no acute distress     Psychiatric: age appropriate behavior, cooperative     Skin: no obvious skin lesions     Head: Normocephalic, Atraumatic     Lymphatic: no cervical lymphadenopathy     ENT:        External Ear: Normal without any obvious lesions.        External Nose:  Normal, no structural deformities		        Anterior Nasal Cavity: Normal mucosa, no turbinate hypertrophy, straight septum     Oral Cavity:  Good dentition, tongue midline, no lesions or ulcerations, uvula midline     Neck: No palpable lymphadenopathy        Tracheostomy Site: Normal with no evidence of breakdown or excoriation           Tracheostomy size and type: Shiley #6 uncuffed     Pulmonary: No Acute Distress.      CV: no peripheral edema/cyanosis     GI: nondistended, nontender     Peripheral vascular: no JVD or edema     Neurologic: awake and alert, no obvious facial weakness      TRACHEOSCOPY EXAM (Scope #G3):      -Verbal consent was obtained from patient prior to procedure.       Indication: h/o tracheal stenosis,      Flexible tracheoscopy was performed and revealed the following:         -- No mass or lesions seen in the trachea       -- No narrowing or stenosis seen down to the gerald       -- No bleeding or mucus seen.       The patient tolerated the procedure well.      LARYNGOSCOPY EXAM (Scope #G3):      -Verbal consent was obtained from patient prior to procedure.       Indication: Tracheostomy in place, history of tracheal stenosis       A flexible endoscope was used to examine the left and right nasal cavities, posterior oropharynx, hypopharynx, and larynx. The nasal valve areas were examined for abnormalities or collapse. The inferior and middle turbinates were evaluated. The middle and superior meatuses, the sphenoethmoid recesses, and the nasopharynx were examined and inspected for mucopurulence, polyps, and nasal masses. The oropharynx, tongue base/vallecula, epiglottis, aryepiglottic folds, arytenoids, vocal cords, hypopharynx were also inspected for swelling, inflammation, or dysfunction. The patient tolerated the procedure without complications.         Flexible laryngoscopy revealed the following:       -- Nasopharynx had no mass or exudate.       -- Base of tongue was symmetric and not enlarged.       -- Vallecula was clear       -- Epiglottis, both aryepiglottic folds and both false vocal folds were normal       -- Arytenoids both without edema and erythema        -- True vocal folds were fully mobile and without lesions.        -- Post cricoid area was clear.       -- Interarytenoid edema was absent       The patient tolerated the procedure well.
PULMONARY CONSULT  Location of Patient :  [facility] D[facility] ( [facility])  Attending requesting Consult:Germaine Parker      Initial HPI on admission:  HPI:  68 year old male with PMH of DM II, HLD, JAGDISH, hyponatremia, tracheal stenosis s/p tracheostomy march 2022, and glioblastoma who presented to [facility] on 3/7  for cerebral angiogram with Avastin treatment.    Per daughter, symptoms started in January 2022 when patient had onset of confusion, dizziness, and speech difficulty while in [place] and was diagnosed with brain mass. Pt evaluated upon return to the  at [facility] ED with CT head revealing left frontal brain mass. Pt underwent resection of left frontal enhancing tumor with GLEOLAN placed 1/27/2022. Pathology showed gliosarcoma, WHO grade IV, IDH wild-type, EGFR non amplified, MGMT promotor methylated. Pt was discharged to rehab and underwent radiation treatment and Temodar chemotherapy (completed 12/22). MRI 12/22 showed recurrence of brain mass.    The patient presented to [facility] on 3/7 referred by Dr. Mackenzie for trial participation cerebral angiogram with Avastin treatment. Post op in cath lab hypertensive, +flash pulmonary edema with desaturation, given diuretics. He developed new global aphasia and right sided weakness on 3/8. Hospital course further significant for metabolic acidosis, desaturation requiring vent support, PNA, stress induced cardiomyopathy (Takotsubo cardiomyopathy, reduced but stable EF at 20%), thrombocytopenia, chronic DVT. NGT placed and removed on 3/19, PMV per s&s.    Patient was evaluated by PM&R and therapy for functional deficits, gait/ADL impairments and acute rehabilitation was recommended. Patient was medically optimized for discharge to [facility] IRU on 3/21/23.     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 has RW and SC from previous  PTA: Independent in ADLs and ambulation     CURRENT FUNCTIONAL STATUS  Date: 3/20  Bed Mobility: CG, 1 person  Transfers: CG  Gait: CG, 75ft   Upper Body Dressing: CG  Lower Body Dressing: Supervision (21 Mar 2023 13:09)       Review of Systems: as stated above          Medications:  MEDICATIONS  (STANDING):  atorvastatin 20 milliGRAM(s) Oral at bedtime  dextrose 5%. 1000 milliLiter(s) (50 mL/Hr) IV Continuous <Continuous>  dextrose 5%. 1000 milliLiter(s) (100 mL/Hr) IV Continuous <Continuous>  dextrose 50% Injectable 25 Gram(s) IV Push once  dextrose 50% Injectable 12.5 Gram(s) IV Push once  dextrose 50% Injectable 25 Gram(s) IV Push once  enoxaparin Injectable 70 milliGRAM(s) SubCutaneous every 12 hours  escitalopram 5 milliGRAM(s) Oral daily  fluticasone propionate 50 MICROgram(s)/spray Nasal Spray 1 Spray(s) Both Nostrils two times a day  glucagon  Injectable 1 milliGRAM(s) IntraMuscular once  insulin lispro (ADMELOG) corrective regimen sliding scale   SubCutaneous three times a day before meals  insulin lispro (ADMELOG) corrective regimen sliding scale   SubCutaneous at bedtime  levETIRAcetam 750 milliGRAM(s) Oral two times a day  melatonin 6 milliGRAM(s) Oral at bedtime  metoprolol succinate ER 50 milliGRAM(s) Oral daily  pantoprazole    Tablet 40 milliGRAM(s) Oral before breakfast  sacubitril 24 mG/valsartan 26 mG 1 Tablet(s) Oral every 12 hours  sodium chloride 2 Gram(s) Oral every 6 hours    MEDICATIONS  (PRN):  acetaminophen     Tablet .. 650 milliGRAM(s) Oral every 6 hours PRN Temp greater or equal to 38C (100.4F), Mild Pain (1 - 3)  acetylcysteine 20%  Inhalation 4 milliLiter(s) Inhalation every 6 hours PRN secretions  benzocaine/menthol Lozenge 1 Lozenge Oral daily PRN Sore Throat  dextrose Oral Gel 15 Gram(s) Oral once PRN Blood Glucose LESS THAN 70 milliGRAM(s)/deciliter  polyethylene glycol 3350 17 Gram(s) Oral daily PRN Constipation  sodium chloride 3%  Inhalation 4 milliLiter(s) Inhalation every 6 hours PRN secretions      Antibiotics History      Heme Medications   enoxaparin Injectable 70 milliGRAM(s) SubCutaneous every 12 hours, 03-21-23 @ 17:50      GI Medications  pantoprazole    Tablet 40 milliGRAM(s) Oral before breakfast, 03-21-23 @ 17:52, Routine  polyethylene glycol 3350 17 Gram(s) Oral daily, 03-21-23 @ 17:51, Routine PRN        Home Medications:  Last Order Reconciliation Date: 03-21-23 @ 13:46 (Admission Reconciliation)  acetaminophen 325 mg oral tablet: 2 tab(s) orally every 6 hours, As needed, Temp greater or equal to 38C (100.4F), Mild Pain (1 - 3) (03-21-23 @ 11:44)  acetylcysteine 20% inhalation solution: 4 milliliter(s) inhaled every 6 hours, As needed, secretions (03-21-23 @ 11:44)  atorvastatin 20 mg oral tablet: 1 tab(s) orally once a day (at bedtime) (03-21-23 @ 11:44)  enoxaparin: 75 milligram(s) subcutaneous 2 times a day (03-21-23 @ 11:44)  escitalopram 5 mg oral tablet: 1 tab(s) orally once a day (03-21-23 @ 11:44)  fluticasone 50 mcg/inh nasal spray: 1 spray(s) nasal 2 times a day (03-21-23 @ 11:44)  ipratropium-albuterol 0.5 mg-2.5 mg/3 mL inhalation solution: 3 milliliter(s) inhaled every 6 hours, As needed, Shortness of Breath and/or Wheezing (03-21-23 @ 11:44)  Januvia 100 mg oral tablet: 1 tab(s) orally once a day  (03-21-23 @ 12:16)  levETIRAcetam 750 mg oral tablet: 1 tab(s) orally 2 times a day (03-21-23 @ 11:44)  melatonin 5 mg oral tablet: 1 tab(s) orally once a day (at bedtime) (03-21-23 @ 11:44)  menthol-benzocaine topical: 1 lozenge orally once a day (03-21-23 @ 11:44)  metoprolol succinate 50 mg oral tablet, extended release: 1 tab(s) orally once a day (03-21-23 @ 11:44)  ondansetron 2 mg/mL injectable solution: 4 milligram(s) injectable every 8 hours, As Needed (03-21-23 @ 11:44)  sacubitril-valsartan 24 mg-26 mg oral tablet: 1 tab(s) orally every 12 hours (03-21-23 @ 11:44)  sodium chloride 1 g oral tablet: 2 tab(s) orally every 6 hours (03-21-23 @ 11:44)  sodium chloride 3% inhalation solution: 4 milliliter(s) inhaled every 6 hours, As needed, secretions (03-21-23 @ 11:44)      LABS:                        10.2   3.29  )-----------( 125      ( 22 Mar 2023 06:07 )             30.4     03-22    128<L>  |  96  |  13  ----------------------------<  112<H>  4.1   |  26  |  0.88    Ca    8.2<L>      22 Mar 2023 06:07  Phos  2.4     03-21  Mg     1.8     03-21    TPro  6.4  /  Alb  2.7<L>  /  TBili  0.4  /  DBili  x   /  AST  33  /  ALT  51<H>  /  AlkPhos  78  03-22          RADIOLOGY  CXR:   < from: Xray Chest 1 View- PORTABLE-Routine (Xray Chest 1 View- PORTABLE-Routine in AM.) (03.20.23 @ 05:23) >  INTERPRETATION:  Technique: One portable AP view chest    COMPARISON: March 19, 2023    Findings/  impression: Improved previously described interstitial pulmonary edema.   Left inferior hemithorax probable small volume loculated effusion and   mild left basilar linear opacities, probably atelectasis. Tracheostomy in   situ. No pneumothorax.    --- End of Report ---    < end of copied text >      VITALS:  T(C): 36.3 (03-22-23 @ 08:02), Max: 36.8 (03-21-23 @ 18:29)  T(F): 97.3 (03-22-23 @ 08:02), Max: 98.3 (03-21-23 @ 18:29)  HR: 62 (03-22-23 @ 08:02) (61 - 70)  BP: 104/60 (03-22-23 @ 08:02) (96/63 - 112/69)  BP(mean): --  ABP: --  ABP(mean): --  RR: 16 (03-22-23 @ 08:02) (15 - 16)  SpO2: 96% (03-22-23 @ 08:02) (94% - 96%)  CVP(mm Hg): --  CVP(cm H2O): --    Ins and Outs     03-21-23 @ 07:01  -  03-22-23 @ 07:00  --------------------------------------------------------  IN: 0 mL / OUT: 800 mL / NET: -800 mL        Height (cm): 170.2 (03-21-23 @ 19:34)  Weight (kg): 71.4 (03-21-23 @ 19:34)  BMI (kg/m2): 24.6 (03-21-23 @ 19:34)        I&O's Detail    21 Mar 2023 07:01  -  22 Mar 2023 07:00  --------------------------------------------------------  IN:  Total IN: 0 mL    OUT:    Voided (mL): 800 mL  Total OUT: 800 mL    Total NET: -800 mL         
Patient is a 68y old  Male who presents with a chief complaint of Glioblastoma (21 Mar 2023 13:09)    HPI, per admission H&P:  This is a 69 YO right handed male with PMH of DM II, HLD, JAGDISH, hyponatremia, tracheal stenosis s/p tracheostomy, and glioblastoma who presented to [facility] on 3/7  for cerebral angiogram with Avastin treatment.    Per daughter, symptoms started in January 2022 when patient had onset of confusion, dizziness, and speech difficulty while in [place] and was diagnosed with brain mass. Pt evaluated upon return [place] the [place] at [facility] ED with CT head revealing left frontal brain mass. Pt underwent resection of left frontal enhancing tumor with GLEOLAN placed 1/27/2022. Pathology showed gliosarcoma, WHO grade IV, IDH wild-type, EGFR non amplified, MGMT promotor methylated. Pt was discharged to rehab and underwent radiation treatment and Temodar chemotherapy (completed 12/22). MRI 12/22 showed recurrence of brain mass.    The patient presented to [facility] on 3/7 referred by Dr. Mackenzie for trial participation cerebral angiogram with Avastin treatment. Post op in cath lab hypertensive, +flash pulmonary edema with desaturation, given diuretics. He developed new global aphasia and right sided weakness on 3/8. Hospital course further significant for metabolic acidosis, desaturation requiring vent support, PNA, stress induced cardiomyopathy (Takotsubo cardiomyopathy, reduced but stable EF at 20%), thrombocytopenia, chronic DVT. NGT placed and removed on 3/19, PMV per s&s.    Patient was evaluated by PM&R and therapy for functional deficits, gait/ADL impairments and acute rehabilitation was recommended. Patient was medically optimized for discharge to [facility] IRU on 3/21/23. (21 Mar 2023 13:09)    Subjective   Patient seen and examined at bedside. Reviewed medical history and recent hospitalization with patient. Denies pain at time of evaluation. Denies cough, sputum production. Capping trach on his own per bedside RN.   Had BP under parameters to administer meds this morning, he denied dizziness, lightheadedness.     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  Type 2 diabetes mellitus  Hyperlipidemia  Iron deficiency anemia  Nephrolithiasis  Thrombocytopenia  Hyponatremia  BPH (benign prostatic hyperplasia)  S/P craniotomy  H/O tracheostomy    SOCIAL HISTORY: Denies smoking, alcohol or drug use  FAMILY HISTORY: Endorses DM in brother. no cardiac hx in family per patient     ALLERGIES:  amoxicillin (Rash)    MEDICATIONS  (STANDING):  atorvastatin 20 milliGRAM(s) Oral at bedtime  dextrose 5%. 1000 milliLiter(s) (50 mL/Hr) IV Continuous <Continuous>  dextrose 5%. 1000 milliLiter(s) (100 mL/Hr) IV Continuous <Continuous>  dextrose 50% Injectable 25 Gram(s) IV Push once  dextrose 50% Injectable 12.5 Gram(s) IV Push once  dextrose 50% Injectable 25 Gram(s) IV Push once  enoxaparin Injectable 70 milliGRAM(s) SubCutaneous every 12 hours  escitalopram 5 milliGRAM(s) Oral daily  fluticasone propionate 50 MICROgram(s)/spray Nasal Spray 1 Spray(s) Both Nostrils two times a day  glucagon  Injectable 1 milliGRAM(s) IntraMuscular once  insulin lispro (ADMELOG) corrective regimen sliding scale   SubCutaneous three times a day before meals  insulin lispro (ADMELOG) corrective regimen sliding scale   SubCutaneous at bedtime  levETIRAcetam 750 milliGRAM(s) Oral two times a day  melatonin 6 milliGRAM(s) Oral at bedtime  metoprolol succinate ER 50 milliGRAM(s) Oral daily  pantoprazole    Tablet 40 milliGRAM(s) Oral before breakfast  sacubitril 24 mG/valsartan 26 mG 1 Tablet(s) Oral every 12 hours  sodium chloride 2 Gram(s) Oral every 6 hours    MEDICATIONS  (PRN):  acetaminophen     Tablet .. 650 milliGRAM(s) Oral every 6 hours PRN Temp greater or equal to 38C (100.4F), Mild Pain (1 - 3)  acetylcysteine 20%  Inhalation 4 milliLiter(s) Inhalation every 6 hours PRN secretions  benzocaine/menthol Lozenge 1 Lozenge Oral daily PRN Sore Throat  dextrose Oral Gel 15 Gram(s) Oral once PRN Blood Glucose LESS THAN 70 milliGRAM(s)/deciliter  polyethylene glycol 3350 17 Gram(s) Oral daily PRN Constipation  sodium chloride 3%  Inhalation 4 milliLiter(s) Inhalation every 6 hours PRN secretions    Review of Systems: Refer to HPI for pertinent positives and negatives. All other ROS reviewed and negative except as otherwise stated above.    Vital Signs Last 24 Hrs  T(F): 97.3 (22 Mar 2023 08:02), Max: 98.3 (21 Mar 2023 18:29)  HR: 62 (22 Mar 2023 08:02) (61 - 70)  BP: 104/60 (22 Mar 2023 08:02) (96/63 - 112/69)  RR: 16 (22 Mar 2023 08:02) (15 - 16)  SpO2: 96% (22 Mar 2023 08:02) (94% - 96%)  I&O's Summary    21 Mar 2023 07:01  -  22 Mar 2023 07:00  --------------------------------------------------------  IN: 0 mL / OUT: 800 mL / NET: -800 mL    PHYSICAL EXAM:  GENERAL: NAD, well-groomed  HEAD:  Atraumatic, Normocephalic  EYES: EOMI, PERRL, conjunctiva and sclera clear  ENMT: Moist mucous membranes, Good dentition  NECK: Supple, No JVD +trach  CHEST/LUNG: Clear to auscultation bilaterally, non-labored breathing, good air entry  HEART: RRR; S1/S2, No murmur  ABDOMEN: Soft, Nontender, Nondistended; Bowel sounds present  VASCULAR: Normal pulses, Normal capillary refill  EXTREMITIES: No cyanosis, No edema  LYMPH: No lymphadenopathy noted  SKIN: Warm, Intact  PSYCH: Normal mood and affect  NERVOUS SYSTEM:  A/O x3, Good concentration; CN 2-12 intact, No focal deficits, moves all extremities    LABS:                        10.2   3.29  )-----------( 125      ( 22 Mar 2023 06:07 )             30.4     03-22    128  |  96  |  13  ----------------------------<  112  4.1   |  26  |  0.88    Ca    8.2      22 Mar 2023 06:07  Phos  2.4     03-21  Mg     1.8     03-21    TPro  6.4  /  Alb  2.7  /  TBili  0.4  /  DBili  x   /  AST  33  /  ALT  51  /  AlkPhos  78  03-22    POCT Blood Glucose.: 129 mg/dL (22 Mar 2023 12:01)  POCT Blood Glucose.: 141 mg/dL (22 Mar 2023 08:05)  POCT Blood Glucose.: 156 mg/dL (21 Mar 2023 21:48)  POCT Blood Glucose.: 87 mg/dL (21 Mar 2023 17:56)    COVID-19 PCR: NotDetec (03-21-23 @ 19:45)  COVID-19 PCR: NotDetec (03-18-23 @ 05:38)    RADIOLOGY & ADDITIONAL TESTS:    Care Discussed with Consultants/Other Providers: d/w bedside RN

## 2023-03-23 NOTE — PROGRESS NOTE ADULT - NS ATTEND AMEND GEN_ALL_CORE FT
I have personally seen and examined the patient with the NP. Medical records reviewed. I have made amendments to the documentation where necessary and adjusted the history, physical examination, and plan as documented by the NP.    GBM/ Seizure ppx - Keppra  Depression - Lexapro  DVT ppx - Lovenox  s/p respiratory failure / trach - Pulmonary and ENT input appreciated  Labs reviewed  Case discussed with staff, hospitalist input reviewed

## 2023-03-24 NOTE — DIETITIAN INITIAL EVALUATION ADULT - ADD RECOMMEND
1. Add glucerna supplement  2. Defer DASH in setting of hyponatremia, pt continues on NaCl tabs  3. Obtain and honor food preferences as able

## 2023-03-24 NOTE — PROGRESS NOTE ADULT - SUBJECTIVE AND OBJECTIVE BOX
Patient is a 68y old  Male who presents with a chief complaint of Glioblastoma (22 Mar 2023 13:33)    HPI:  This is a 67 YO right handed male with PMH of DM II, HLD, JAGDISH, hyponatremia, tracheal stenosis s/p tracheostomy, and glioblastoma who presented to [facility] on 3/7  for cerebral angiogram with Avastin treatment.    Per daughter, symptoms started in January 2022 when patient had onset of confusion, dizziness, and speech difficulty while in [place] and was diagnosed with brain mass. Pt evaluated upon return to the [place] at [facility] ED with CT head revealing left frontal brain mass. Pt underwent resection of left frontal enhancing tumor with GLEOLAN placed 1/27/2022. Pathology showed gliosarcoma, WHO grade IV, IDH wild-type, EGFR non amplified, MGMT promotor methylated. Pt was discharged to rehab and underwent radiation treatment and Temodar chemotherapy (completed 12/22). MRI 12/22 showed recurrence of brain mass.    The patient presented to [facility] on 3/7 referred by Dr. Mackenzie for trial participation cerebral angiogram with Avastin treatment. Post op in cath lab hypertensive, +flash pulmonary edema with desaturation, given diuretics. He developed new global aphasia and right sided weakness on 3/8. Hospital course further significant for metabolic acidosis, desaturation requiring vent support, PNA, stress induced cardiomyopathy (Takotsubo cardiomyopathy, reduced but stable EF at 20%), thrombocytopenia, chronic DVT. NGT placed and removed on 3/19, PMV per s&s.    Patient was evaluated by PM&R and therapy for functional deficits, gait/ADL impairments and acute rehabilitation was recommended. Patient was medically optimized for discharge to [facility] IRU on 3/21/23.      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 1000 milliLiter(s) (50 mL/Hr) IV Continuous <Continuous>  dextrose 5%. 1000 milliLiter(s) (100 mL/Hr) IV Continuous <Continuous>  dextrose 50% Injectable 25 Gram(s) IV Push once  dextrose 50% Injectable 12.5 Gram(s) IV Push once  dextrose 50% Injectable 25 Gram(s) IV Push once  enoxaparin Injectable 70 milliGRAM(s) SubCutaneous every 12 hours  escitalopram 5 milliGRAM(s) Oral daily  fluticasone propionate 50 MICROgram(s)/spray Nasal Spray 1 Spray(s) Both Nostrils two times a day  glucagon  Injectable 1 milliGRAM(s) IntraMuscular once  levETIRAcetam 750 milliGRAM(s) Oral two times a day  melatonin 6 milliGRAM(s) Oral at bedtime  metoprolol succinate ER 50 milliGRAM(s) Oral daily  pantoprazole    Tablet 40 milliGRAM(s) Oral before breakfast  sacubitril 24 mG/valsartan 26 mG 1 Tablet(s) Oral every 12 hours  sodium chloride 2 Gram(s) Oral every 6 hours    MEDICATIONS  (PRN):  acetaminophen     Tablet .. 650 milliGRAM(s) Oral every 6 hours PRN Temp greater or equal to 38C (100.4F), Mild Pain (1 - 3)  acetylcysteine 20%  Inhalation 4 milliLiter(s) Inhalation every 6 hours PRN secretions  benzocaine/menthol Lozenge 1 Lozenge Oral daily PRN Sore Throat  dextrose Oral Gel 15 Gram(s) Oral once PRN Blood Glucose LESS THAN 70 milliGRAM(s)/deciliter  polyethylene glycol 3350 17 Gram(s) Oral daily PRN Constipation  sodium chloride 3%  Inhalation 4 milliLiter(s) Inhalation every 6 hours PRN secretions      Allergies  amoxicillin (Rash)  Intolerances      PHYSICAL EXAM  68y  Vital Signs Last 24 Hrs  T(C): 36.7 (03-24-23 @ 08:30), Max: 36.7 (03-23-23 @ 20:01)  T(F): 98 (03-24-23 @ 08:30), Max: 98.1 (03-23-23 @ 20:01)  HR: 59 (03-24-23 @ 08:30) (57 - 62)  BP: 134/68 (03-24-23 @ 08:30) (106/63 - 134/68)  RR: 15 (03-24-23 @ 08:30) (14 - 16)  SpO2: 97% (03-24-23 @ 08:30) (96% - 98%)      RECENT LABS:                          9.9    3.69  )-----------( 115      ( 23 Mar 2023 05:25 )             29.5     03-23    130<L>  |  97  |  11  ----------------------------<  126<H>  3.9   |  28  |  0.94    Ca    8.3<L>      23 Mar 2023 05:25    TPro  6.4  /  Alb  2.8<L>  /  TBili  0.4  /  DBili  x   /  AST  26  /  ALT  43  /  AlkPhos  74  03-23    LIVER FUNCTIONS - ( 23 Mar 2023 05:25 )  Alb: 2.8 g/dL / Pro: 6.4 g/dL / ALK PHOS: 74 U/L / ALT: 43 U/L / AST: 26 U/L / GGT: x               Review of Systems:   · Additional ROS	Patient seen and evaluated while resting in bed, trach uncapped.  Sleeping well.  Denies fever, chills, CP, SOB, HA, dizziness, nausea, abd pain, or urinary symptoms.  Daily BM.    Physical Exam:   · Constitutional	well-groomed  · Eyes	PERRL; EOMI; conjunctiva clear  · ENMT Comments	+trach, uncapped during encourter  · Respiratory	clear to auscultation bilaterally; no wheezes; no rales; no rhonchi  · Cardiovascular	regular rate and rhythm; S1 S2 present; no murmur  · Gastrointestinal	soft; nontender; nondistended; normal active bowel sounds  · Neurological	cranial nerves II-XII intact; sensation intact; responds to verbal commands  · Motor	5/5 to all extremities  coordination intact  sensation intact  · Psychiatric	normal affect

## 2023-03-24 NOTE — PROGRESS NOTE ADULT - ASSESSMENT
67 yo M with PMH of Hypertension, Type 2 Diabetes Mellitus, Hyperlipidemia Anemia, tracheal stenosis s/p tracheostomy, glioblastoma who presented to [facility] 3/7 for cerebral angiogram with Avastin treatment. Course complicated by metabolic acidosis, acute hypoxic respiratory failure requiring mechanical ventilation, takotsubo cardiomyopathy, A-fib, chronic DVT, dysphagia. Weaned off vent. Now admitted to [facility] for rehab.    # Glioblastoma   - PT/OT/ST per PMR  - Pain management per PMR  - Keppra 750mg BID    # Stress induced cardiomyopathy (Takotsubo Cardiomyopathy)  - EF improved per echo at [facility] - most recently 35-40%  - euvolemic on exam - continue lasix prn based on volume statis  - GDMT - continue entrsto 24/26mg bid, Toprol 50mg daily with holding parameters     # Tracheal stenosis s/p trach  - ENT eval noted  - trach care    # Hyponatremia   - Na 130 on recent labs  - on NaCl tab 2g q6h. plan to taper down as tolerated gradually and cautiously given low EF    # Type 2 Diabetes Mellitus  - HbA1C 5.8%  - Hold home oral med - Januvia  - FS and JANIE    # DVT  - chronic   - Lovenox BID    DVT Prophylaxis:  - Lovenox

## 2023-03-24 NOTE — DIETITIAN INITIAL EVALUATION ADULT - PERTINENT MEDS FT
MEDICATIONS  (STANDING):  atorvastatin 20 milliGRAM(s) Oral at bedtime  dextrose 5%. 1000 milliLiter(s) (50 mL/Hr) IV Continuous <Continuous>  dextrose 5%. 1000 milliLiter(s) (100 mL/Hr) IV Continuous <Continuous>  dextrose 50% Injectable 25 Gram(s) IV Push once  dextrose 50% Injectable 12.5 Gram(s) IV Push once  dextrose 50% Injectable 25 Gram(s) IV Push once  enoxaparin Injectable 70 milliGRAM(s) SubCutaneous every 12 hours  escitalopram 5 milliGRAM(s) Oral daily  fluticasone propionate 50 MICROgram(s)/spray Nasal Spray 1 Spray(s) Both Nostrils two times a day  glucagon  Injectable 1 milliGRAM(s) IntraMuscular once  levETIRAcetam 750 milliGRAM(s) Oral two times a day  melatonin 6 milliGRAM(s) Oral at bedtime  metoprolol succinate ER 50 milliGRAM(s) Oral daily  pantoprazole    Tablet 40 milliGRAM(s) Oral before breakfast  sacubitril 24 mG/valsartan 26 mG 1 Tablet(s) Oral every 12 hours  sodium chloride 2 Gram(s) Oral every 6 hours    MEDICATIONS  (PRN):  acetaminophen     Tablet .. 650 milliGRAM(s) Oral every 6 hours PRN Temp greater or equal to 38C (100.4F), Mild Pain (1 - 3)  acetylcysteine 20%  Inhalation 4 milliLiter(s) Inhalation every 6 hours PRN secretions  benzocaine/menthol Lozenge 1 Lozenge Oral daily PRN Sore Throat  dextrose Oral Gel 15 Gram(s) Oral once PRN Blood Glucose LESS THAN 70 milliGRAM(s)/deciliter  polyethylene glycol 3350 17 Gram(s) Oral daily PRN Constipation  sodium chloride 3%  Inhalation 4 milliLiter(s) Inhalation every 6 hours PRN secretions

## 2023-03-24 NOTE — PROGRESS NOTE ADULT - ASSESSMENT
69 YO right handed male with PMH of DM II, HLD, JAGDISH, hyponatremia, tracheal stenosis s/p tracheostomy, and glioblastoma who presented to [facility] on 3/7  for cerebral angiogram with Avastin treatment. Course s/f  metabolic acidosis, desaturation requiring vent support, PNA, stress induced cardiomyopathy -Takotsubo cardiomyopathy, thrombocytopenia, chronic DVT, and dysphagia. Patient now with gait Instability, ADL impairments and Functional impairments.    #GBM  - s/p cerebral angiogram with Avastin treatment  - Start Comprehensive Rehab Program: PT/OT/ST, 3hours daily and 5 days weekly  - PT: Focused on improving strength, endurance, coordination, balance, functional mobility, and transfers  - OT: Focused on improving strength, fine motor skills, coordination, posture and ADLs.    - ST: to diagnose and treat deficits in swallowing, cognition and communication.   - Keppra 750mg BID    #Respiratory Failure  - metabolic acidosis  - tracheostomy +  PMV  - trach exchanged to 6 shiley cuffless 3/16 by ENT  - mucomyst, sodium [id number]    - Pulm consult appreciated - rec ENT   - ENT Consult appreciated - outpatient follow up with Dr. Caba [phone]    #Takotsubo Cardiomyopathy  - Entresto 24-26mg q 12 hrs  - Metoprolol XL 50mg daily  - EF 20%    #HLD  - Lipitor 20mg daily    #Hyponatremia  - Sodium tabs 2gram Q 6 hrs   - 130 (3/23)    #PNA  -S/p Cefepime 3/13-3/18    #DM II  - ISS and FS - DC  - A1c 5.8 on 3/8  - FS controlled < 150 (3/23) - DC FS, monitor via labs    #Pain management  - Tylenol PRN    #DVT  - Chronic R IM calf DVT  - Lovenox BID    #GI ppx  - Protonix 40mg    #Bowel Regimen  - miralax PRN  - monitor    #Bladder management  - BS on admission, and q 8 hours (SC if > 400) - <300 PVR, have not required straight cath  - Toileting program  - Monitor UO    #FEN   - Diet: consistent carb with evening snacks    #Skin:  - Skin on admission: Intact  - Pressure injury/Skin: Turn Q2hrs while in bed, OOB to Chair, PT/OT     #Dysphagia    - SLP: evaluation and treatment    #Sleep:   - Maintain quiet hours and low stim environment.  - Melatonin PRN to maximize participation in therapy during the day.     #Precaution  - Fall, Aspiration, cardiac, Seizure    #GOC  CODE STATUS: FULL CODE    Outpatient Follow-up (Specialty/Name of physician):    Trevin Ho)  Neurosurgery  [address], [address] [place] [address]  Phone: [phone]  Fax: [phone]    Chapin Hoang)  EndocrinologyMetabDiabetes; Internal Medicine  [address] [address]  Phone: [phone]  Fax: [phone]    Ann Guerrero)  Cardiovascular Disease; Internal Medicine  [address], [address]  [place] [address]  Phone: [phone]  Fax: [phone]    Lizzy Houser)  HematologyOncology; Internal Medicine  [address] [address]  Phone: [phone]  Fax: [phone]    Markel Lopez)  Otolaryngology  [address] [place] [address]  Phone: [phone]  Fax: [phone]    Isabel Chiang  CARDIOLOGY  [address] [address]  Phone: [phone]  Fax: [phone]    Hao Caba  [facility]  OTOLARYNG [address] [facility]  Scheduled Appointment: 04/18/2023    Vaughn Mitchell  [facility]  CARDIOLOGY [facility] [place]   Scheduled Appointment: 05/16/2023

## 2023-03-24 NOTE — DIETITIAN INITIAL EVALUATION ADULT - PERTINENT LABORATORY DATA
03-23    130<L>  |  97  |  11  ----------------------------<  126<H>  3.9   |  28  |  0.94    Ca    8.3<L>      23 Mar 2023 05:25    TPro  6.4  /  Alb  2.8<L>  /  TBili  0.4  /  DBili  x   /  AST  26  /  ALT  43  /  AlkPhos  74  03-23  A1C with Estimated Average Glucose Result: 5.8 % (03-08-23 @ 03:00)  A1C with Estimated Average Glucose Result: 5.9 % (03-07-23 @ 18:00)

## 2023-03-24 NOTE — DIETITIAN INITIAL EVALUATION ADULT - ORAL INTAKE PTA/DIET HISTORY
Pt endorses decreased po intake during acute hospitalization. Tolerates regular diet. Dislikes pasta. NKFA. Able to self feed. Hx DMII, A1c 5.8% indicates optimal glycemic control. Noted hx hyponatremia, on NaCl tabs.

## 2023-03-24 NOTE — DIETITIAN INITIAL EVALUATION ADULT - ETIOLOGY
related to inadequate protein-energy intake in setting of acute illness, respiratory failure requiring trach

## 2023-03-24 NOTE — PROGRESS NOTE ADULT - SUBJECTIVE AND OBJECTIVE BOX
Patient is a 68y old  Male who presents with a chief complaint of Glioblastoma (23 Mar 2023 12:49)    Patient seen and examined at bedside. No acute overnight events. Tolerating therapy    ALLERGIES:  amoxicillin (Rash)    MEDICATIONS  (STANDING):  atorvastatin 20 milliGRAM(s) Oral at bedtime  dextrose 5%. 1000 milliLiter(s) (50 mL/Hr) IV Continuous <Continuous>  dextrose 5%. 1000 milliLiter(s) (100 mL/Hr) IV Continuous <Continuous>  dextrose 50% Injectable 25 Gram(s) IV Push once  dextrose 50% Injectable 12.5 Gram(s) IV Push once  dextrose 50% Injectable 25 Gram(s) IV Push once  enoxaparin Injectable 70 milliGRAM(s) SubCutaneous every 12 hours  escitalopram 5 milliGRAM(s) Oral daily  fluticasone propionate 50 MICROgram(s)/spray Nasal Spray 1 Spray(s) Both Nostrils two times a day  glucagon  Injectable 1 milliGRAM(s) IntraMuscular once  levETIRAcetam 750 milliGRAM(s) Oral two times a day  melatonin 6 milliGRAM(s) Oral at bedtime  metoprolol succinate ER 50 milliGRAM(s) Oral daily  pantoprazole    Tablet 40 milliGRAM(s) Oral before breakfast  sacubitril 24 mG/valsartan 26 mG 1 Tablet(s) Oral every 12 hours  sodium chloride 2 Gram(s) Oral every 6 hours    MEDICATIONS  (PRN):  acetaminophen     Tablet .. 650 milliGRAM(s) Oral every 6 hours PRN Temp greater or equal to 38C (100.4F), Mild Pain (1 - 3)  acetylcysteine 20%  Inhalation 4 milliLiter(s) Inhalation every 6 hours PRN secretions  benzocaine/menthol Lozenge 1 Lozenge Oral daily PRN Sore Throat  dextrose Oral Gel 15 Gram(s) Oral once PRN Blood Glucose LESS THAN 70 milliGRAM(s)/deciliter  polyethylene glycol 3350 17 Gram(s) Oral daily PRN Constipation  sodium chloride 3%  Inhalation 4 milliLiter(s) Inhalation every 6 hours PRN secretions    Vital Signs Last 24 Hrs  T(F): 98 (24 Mar 2023 08:30), Max: 98.1 (23 Mar 2023 20:01)  HR: 59 (24 Mar 2023 08:30) (57 - 62)  BP: 134/68 (24 Mar 2023 08:30) (106/63 - 134/68)  RR: 15 (24 Mar 2023 08:30) (14 - 16)  SpO2: 97% (24 Mar 2023 08:30) (96% - 98%)  I&O's Summary    23 Mar 2023 07:01  -  24 Mar 2023 07:00  --------------------------------------------------------  IN: 0 mL / OUT: 1400 mL / NET: -1400 mL      BMI (kg/m2): 24.6 (03-21-23 @ 19:34)    PHYSICAL EXAM:  General: NAD, A/O x 3  ENT: MMM, no tonsilar exudate  Neck: +trach  Lungs: Clear to auscultation bilaterally, no wheezes. Good air entry bilaterally   Cardio: RRR, S1/S2, No murmurs  Abdomen: Soft, Nontender, Nondistended; Bowel sounds present  Extremities: No calf tenderness, No pitting edema    LABS:                        9.9    3.69  )-----------( 115      ( 23 Mar 2023 05:25 )             29.5       03-23    130  |  97  |  11  ----------------------------<  126  3.9   |  28  |  0.94    Ca    8.3      23 Mar 2023 05:25    TPro  6.4  /  Alb  2.8  /  TBili  0.4  /  DBili  x   /  AST  26  /  ALT  43  /  AlkPhos  74  03-23     POCT Blood Glucose.: 147 mg/dL (23 Mar 2023 12:10)    COVID-19 PCR: NotDetec (03-21-23 @ 19:45)  COVID-19 PCR: NotDetec (03-18-23 @ 05:38)    RADIOLOGY & ADDITIONAL TESTS:     Care Discussed with Consultants/Other Providers:

## 2023-03-24 NOTE — DIETITIAN INITIAL EVALUATION ADULT - OTHER INFO
69 YO right handed male with PMH of DM II, HLD, JAGDISH, hyponatremia, tracheal stenosis s/p tracheostomy, and glioblastoma who presented to [facility] on 3/7  for cerebral angiogram with Avastin treatment. Course s/f  metabolic acidosis, desaturation requiring vent support, PNA, stress induced cardiomyopathy -Takotsubo cardiomyopathy, thrombocytopenia, chronic DVT, and dysphagia.    Pt observed feeding himself during lunch today, tolerating diet and eating well. Reports much improved appetite. Consuming >75% at most meals per nursing documentation. Pt reports UBW 178lbs with estimated 20lbs weight loss. Current weight (3/23) 157.4lbs, indicates 11% weight loss. NFPE with evidence of moderate muscle wasting/fat loss. Pt receptive to adding Glucerna qd. Pt continues on NaCl for hyponatremia, so will defer DASH/TLC diet. Denies GI issues, last BM 3/24 per pt.

## 2023-03-25 NOTE — PROGRESS NOTE ADULT - ASSESSMENT
67 yo M  admitted to acute Rehabilitation with GBM     Pt. Stable  Active Issues   Seizure ppx-- Keppra 750mg BID    #Respiratory Failure  - metabolic acidosis  - tracheostomy +  PMV  - trach exchanged to 6 shiley cuffless 3/16 by ENT      #Takotsubo Cardiomyopathy  - Entresto 24-26mg q 12 hrs  - Metoprolol XL 50mg daily  - EF 20%    #HLD  - Lipitor 20mg daily    #Hyponatremia  - Sodium tabs 2gram Q 6 hrs   - 130 (3/23)      #Pain management  - Tylenol PRN    #DVT  - Chronic R IM calf DVT  - Lovenox BID    #GI ppx  - Protonix 40mg    No Medication changes:    Cont. comprehensive inpatient PT, OT and Speech    No acute issues,   Cont. current plan of care and medications as per primary team

## 2023-03-25 NOTE — PROGRESS NOTE ADULT - SUBJECTIVE AND OBJECTIVE BOX
Patient is a 68y old  Male who presents with a chief complaint of Malignant neoplasm of brain (24 Mar 2023 13:41)    Patient seen and examined at bedside. No acute overnight events. Slept well. Tolerating therapy    ALLERGIES:  amoxicillin (Rash)    MEDICATIONS  (STANDING):  atorvastatin 20 milliGRAM(s) Oral at bedtime  dextrose 5%. 1000 milliLiter(s) (50 mL/Hr) IV Continuous <Continuous>  dextrose 5%. 1000 milliLiter(s) (100 mL/Hr) IV Continuous <Continuous>  dextrose 50% Injectable 25 Gram(s) IV Push once  dextrose 50% Injectable 12.5 Gram(s) IV Push once  dextrose 50% Injectable 25 Gram(s) IV Push once  enoxaparin Injectable 70 milliGRAM(s) SubCutaneous every 12 hours  escitalopram 5 milliGRAM(s) Oral daily  fluticasone propionate 50 MICROgram(s)/spray Nasal Spray 1 Spray(s) Both Nostrils two times a day  glucagon  Injectable 1 milliGRAM(s) IntraMuscular once  levETIRAcetam 750 milliGRAM(s) Oral two times a day  melatonin 6 milliGRAM(s) Oral at bedtime  metoprolol succinate ER 50 milliGRAM(s) Oral daily  pantoprazole    Tablet 40 milliGRAM(s) Oral before breakfast  sacubitril 24 mG/valsartan 26 mG 1 Tablet(s) Oral every 12 hours  sodium chloride 2 Gram(s) Oral every 6 hours    MEDICATIONS  (PRN):  acetaminophen     Tablet .. 650 milliGRAM(s) Oral every 6 hours PRN Temp greater or equal to 38C (100.4F), Mild Pain (1 - 3)  acetylcysteine 20%  Inhalation 4 milliLiter(s) Inhalation every 6 hours PRN secretions  benzocaine/menthol Lozenge 1 Lozenge Oral daily PRN Sore Throat  dextrose Oral Gel 15 Gram(s) Oral once PRN Blood Glucose LESS THAN 70 milliGRAM(s)/deciliter  polyethylene glycol 3350 17 Gram(s) Oral daily PRN Constipation  sodium chloride 3%  Inhalation 4 milliLiter(s) Inhalation every 6 hours PRN secretions    Vital Signs Last 24 Hrs  T(F): 98.3 (24 Mar 2023 19:50), Max: 98.3 (24 Mar 2023 19:50)  HR: 63 (25 Mar 2023 08:25) (58 - 63)  BP: 102/60 (24 Mar 2023 19:50) (102/60 - 102/60)  RR: 16 (25 Mar 2023 08:25) (14 - 16)  SpO2: 95% (25 Mar 2023 08:25) (95% - 99%)  I&O's Summary    24 Mar 2023 07:01  -  25 Mar 2023 07:00  --------------------------------------------------------  IN: 0 mL / OUT: 1400 mL / NET: -1400 mL    BMI (kg/m2): 24.6 (03-25-23 @ 12:45)    PHYSICAL EXAM:  General: NAD, A/O x 3  ENT: MMM, no tonsilar exudate  Neck: Supple, No JVD +trach  Lungs: Clear to auscultation bilaterally, no wheezes. Good air entry bilaterally   Cardio: RRR, S1/S2, No murmurs  Abdomen: Soft, Nontender, Nondistended; Bowel sounds present  Extremities: No calf tenderness, No pitting edema    LABS:                        9.9    3.69  )-----------( 115      ( 23 Mar 2023 05:25 )             29.5       03-23    130  |  97  |  11  ----------------------------<  126  3.9   |  28  |  0.94    Ca    8.3      23 Mar 2023 05:25    TPro  6.4  /  Alb  2.8  /  TBili  0.4  /  DBili  x   /  AST  26  /  ALT  43  /  AlkPhos  74  03-23     COVID-19 PCR: NotDetec (03-21-23 @ 19:45)  COVID-19 PCR: NotDetec (03-18-23 @ 05:38)    RADIOLOGY & ADDITIONAL TESTS:     Care Discussed with Consultants/Other Providers:

## 2023-03-25 NOTE — PROGRESS NOTE ADULT - SUBJECTIVE AND OBJECTIVE BOX
Subjective: Examined this AM.  Patient asking when his trach will be coming out.  No overnight issues    VITALS  T(C): 36.8 (03-24-23 @ 19:50), Max: 36.8 (03-24-23 @ 19:50)  HR: 63 (03-25-23 @ 08:25) (58 - 63)  BP: 102/60 (03-24-23 @ 19:50) (102/60 - 102/60)  RR: 16 (03-25-23 @ 08:25) (14 - 16)  SpO2: 95% (03-25-23 @ 08:25) (95% - 99%)  Wt(kg): --    REVIEW OF SYSTEMS  Pertinent in last 24 h: Neurological deficits      Physical Exam:  Constitutional - NAD, Comfortable  HEENT - Portex trach–tolerating Passy-Walworth valve  Chest - breathing comfortably on RA  Cardiovascular - Warm well perfused  Abdomen -  Soft, NTND  Extremities - No edema, No calf tenderness   Neurologic Exam -    Stable                Cognitive - Awake, Alert, AAO     Cranial Nerves - dysphagia     Motor - no new deficit     Psychiatric - Mood stable, Affect WNL  -    RECENT LABS/IMAGING                  MEDICATIONS   acetaminophen     Tablet .. 650 milliGRAM(s) every 6 hours PRN  acetylcysteine 20%  Inhalation 4 milliLiter(s) every 6 hours PRN  atorvastatin 20 milliGRAM(s) at bedtime  benzocaine/menthol Lozenge 1 Lozenge daily PRN  dextrose 5%. 1000 milliLiter(s) <Continuous>  dextrose 5%. 1000 milliLiter(s) <Continuous>  dextrose 50% Injectable 25 Gram(s) once  dextrose 50% Injectable 12.5 Gram(s) once  dextrose 50% Injectable 25 Gram(s) once  dextrose Oral Gel 15 Gram(s) once PRN  enoxaparin Injectable 70 milliGRAM(s) every 12 hours  escitalopram 5 milliGRAM(s) daily  fluticasone propionate 50 MICROgram(s)/spray Nasal Spray 1 Spray(s) two times a day  glucagon  Injectable 1 milliGRAM(s) once  levETIRAcetam 750 milliGRAM(s) two times a day  melatonin 6 milliGRAM(s) at bedtime  metoprolol succinate ER 50 milliGRAM(s) daily  pantoprazole    Tablet 40 milliGRAM(s) before breakfast  polyethylene glycol 3350 17 Gram(s) daily PRN  sacubitril 24 mG/valsartan 26 mG 1 Tablet(s) every 12 hours  sodium chloride 2 Gram(s) every 6 hours  sodium chloride 3%  Inhalation 4 milliLiter(s) every 6 hours PRN      --------------------------------------------------------------------

## 2023-03-26 NOTE — CHART NOTE - NSCHARTNOTEFT_GEN_A_CORE
Called by RN for midsternal chest pain extending to esophagus. Patient seen and examined at bedside. He reports chest discomfort that started 1hr after eating his meal. He has episodes of belching which alleviates his symptoms. Maalox given x 1 with improvement of symptoms. Given recent hx of Taketsubo cardiomyopathy, ECG obtained which showed no evidence of acute MI. VS stable. Patient comfortable and reports significant improvement after belching and maalox dose. Pain likely secondary to dyspepsia. Will order maalox PRN x 1 day. Will continue to monitor. RN to call for any changes. Called by RN for midsternal chest pain extending to esophagus. Patient seen and examined at bedside. He reports chest discomfort that started 1hr after eating his meal. He has episodes of belching which alleviates his symptoms. Maalox given x 1 with improvement of symptoms. Given recent hx of Taketsubo cardiomyopathy, ECG obtained which showed no evidence of acute MI. VS stable. Patient comfortable and reports significant improvement after belching and maalox dose. Pain likely secondary to dyspepsia. Will order maalox PRN x 1 day. Will continue to monitor. RN to call for any changes.    Addendum 23:00  Patient c/o similar pain. He was given Maalox with no relief. Advised nurse to sit patient upright and give Tums x 1. Patient with resolution of symptoms after Tums dose.    Addendum 5:30  Patient c/o midsternal pain during AM blood draw slightly different in quality to yesterday (no longer radiates to esophagus). Repeat ECG and cardiac enzymes obtained. Tums ordered x 1. Will continue to monitor.

## 2023-03-26 NOTE — PROVIDER CONTACT NOTE (CHANGE IN STATUS NOTIFICATION) - SITUATION
C/O chest and throat pain. /70 HR 73 T 97.5 Resp. 100% trach collar 28% Pt states pain going up to throat. not able to described pain just not comfortable.

## 2023-03-26 NOTE — PROGRESS NOTE ADULT - SUBJECTIVE AND OBJECTIVE BOX
Patient is a 68y old  Male who presents with a chief complaint of Glioblastoma (25 Mar 2023 14:55)    Patient seen and examined at bedside. No acute overnight events. Toprol held this am due to parameters    ALLERGIES:  amoxicillin (Rash)    MEDICATIONS  (STANDING):  atorvastatin 20 milliGRAM(s) Oral at bedtime  dextrose 5%. 1000 milliLiter(s) (50 mL/Hr) IV Continuous <Continuous>  dextrose 5%. 1000 milliLiter(s) (100 mL/Hr) IV Continuous <Continuous>  dextrose 50% Injectable 25 Gram(s) IV Push once  dextrose 50% Injectable 12.5 Gram(s) IV Push once  dextrose 50% Injectable 25 Gram(s) IV Push once  enoxaparin Injectable 70 milliGRAM(s) SubCutaneous every 12 hours  escitalopram 5 milliGRAM(s) Oral daily  fluticasone propionate 50 MICROgram(s)/spray Nasal Spray 1 Spray(s) Both Nostrils two times a day  glucagon  Injectable 1 milliGRAM(s) IntraMuscular once  levETIRAcetam 750 milliGRAM(s) Oral two times a day  melatonin 6 milliGRAM(s) Oral at bedtime  metoprolol succinate ER 50 milliGRAM(s) Oral daily  pantoprazole    Tablet 40 milliGRAM(s) Oral before breakfast  sacubitril 24 mG/valsartan 26 mG 1 Tablet(s) Oral every 12 hours  sodium chloride 2 Gram(s) Oral every 6 hours    MEDICATIONS  (PRN):  acetaminophen     Tablet .. 650 milliGRAM(s) Oral every 6 hours PRN Temp greater or equal to 38C (100.4F), Mild Pain (1 - 3)  acetylcysteine 20%  Inhalation 4 milliLiter(s) Inhalation every 6 hours PRN secretions  benzocaine/menthol Lozenge 1 Lozenge Oral daily PRN Sore Throat  dextrose Oral Gel 15 Gram(s) Oral once PRN Blood Glucose LESS THAN 70 milliGRAM(s)/deciliter  polyethylene glycol 3350 17 Gram(s) Oral daily PRN Constipation  sodium chloride 3%  Inhalation 4 milliLiter(s) Inhalation every 6 hours PRN secretions    Vital Signs Last 24 Hrs  T(F): 98.1 (26 Mar 2023 07:36), Max: 98.1 (26 Mar 2023 07:36)  HR: 68 (26 Mar 2023 08:51) (62 - 68)  BP: 100/51 (26 Mar 2023 07:36) (100/51 - 116/68)  RR: 16 (26 Mar 2023 08:51) (14 - 16)  SpO2: 96% (26 Mar 2023 08:51) (96% - 99%)  I&O's Summary    25 Mar 2023 07:01  -  26 Mar 2023 07:00  --------------------------------------------------------  IN: 0 mL / OUT: 900 mL / NET: -900 mL    BMI (kg/m2): 24.6 (03-25-23 @ 12:45)    PHYSICAL EXAM:  General: NAD, A/O x 3  ENT: MMM, no tonsilar exudate  Neck: Supple, No JVD +trach  Lungs: Clear to auscultation bilaterally, no wheezes. Good air entry bilaterally   Cardio: RRR, S1/S2, No murmurs  Abdomen: Soft, Nontender, Nondistended; Bowel sounds present  Extremities: No calf tenderness, No pitting edema    LABS:      COVID-19 PCR: NotDetec (03-21-23 @ 19:45)  COVID-19 PCR: NotDetec (03-18-23 @ 05:38)    RADIOLOGY & ADDITIONAL TESTS:     Care Discussed with Consultants/Other Providers:

## 2023-03-26 NOTE — PROGRESS NOTE ADULT - ASSESSMENT
67 yo M  admitted to acute Rehabilitation with GBM     Pt. Stable  Active Issues   Seizure ppx-- Keppra 750mg BID    #Respiratory Failure  - metabolic acidosis  - tracheostomy +  PMV  - trach exchanged to 6 shiley cuffless 3/16 by ENT      #Takotsubo Cardiomyopathy  - Entresto 24-26mg q 12 hrs  - Metoprolol XL --decreased to 25mg daily--d/w hospitalist  - EF 20%    #HLD  - Lipitor 20mg daily    #Hyponatremia  - Sodium tabs 2gram Q 6 hrs   - 130 (3/23)      #Pain management  - Tylenol PRN    #DVT  - Chronic R IM calf DVT  - Lovenox BID    #GI ppx  - Protonix 40mg        Cont. comprehensive inpatient PT, OT and Speech    No acute issues,   Cont. current plan of care and medications as per primary team

## 2023-03-26 NOTE — PROGRESS NOTE ADULT - ASSESSMENT
67 yo M with PMH of Hypertension, Type 2 Diabetes Mellitus, Hyperlipidemia Anemia, tracheal stenosis s/p tracheostomy, glioblastoma who presented to [facility] 3/7 for cerebral angiogram with Avastin treatment. Course complicated by metabolic acidosis, acute hypoxic respiratory failure requiring mechanical ventilation, takotsubo cardiomyopathy, A-fib, chronic DVT, dysphagia. Weaned off vent. Now admitted to [facility] for rehab.    # Glioblastoma   - PT/OT/ST per PMR  - Pain management per PMR  - Keppra 750mg BID    # Stress induced cardiomyopathy (Takotsubo Cardiomyopathy)  - EF improved per echo at [facility] - most recently 35-40%  - euvolemic on exam - continue lasix prn based on volume statis  - GDMT - continue entrsto 24/26mg bid, Toprol 50mg daily with holding parameters     # Tracheal stenosis s/p trach  - ENT eval noted  - trach care    # Hyponatremia   - Na 130 on recent labs  - on NaCl tab 2g q6h. plan to taper down as tolerated gradually and cautiously given low EF    # Type 2 Diabetes Mellitus  - HbA1C 5.8%  - Hold home oral med - Januvia  - FS and JANIE    # DVT  - chronic   - Lovenox BID    DVT Prophylaxis:  - Lovenox

## 2023-03-26 NOTE — PROVIDER CONTACT NOTE (CHANGE IN STATUS NOTIFICATION) - BACKGROUND
68 male hx. glioblastoma left frontal mass resection. Went for cerebral angiogram with Asastin treatment. 3/8 Right sided weakness and global apshasia. Admitted to [facility] 3/21/2023.

## 2023-03-26 NOTE — PROGRESS NOTE ADULT - SUBJECTIVE AND OBJECTIVE BOX
Subjective: Seen this AM, resting comfortably, No new complaints or overnight issues,  Nursing reports Toprol held this AM due to parameter    VITALS  T(C): 36.7 (03-26-23 @ 07:36), Max: 36.7 (03-26-23 @ 07:36)  HR: 68 (03-26-23 @ 08:51) (62 - 68)  BP: 100/51 (03-26-23 @ 07:36) (100/51 - 116/68)  RR: 16 (03-26-23 @ 08:51) (14 - 16)  SpO2: 96% (03-26-23 @ 08:51) (96% - 99%)  Wt(kg): --    REVIEW OF SYSTEMS  Pertinent in last 24 h: Neurological deficits      Physical Exam:  Constitutional - NAD, Comfortable  HEENT - Portex trach–tolerating Passy-Winthrop valve  Chest - breathing comfortably on RA  Cardiovascular - Warm well perfused  Abdomen -  Soft, NTND  Extremities - No edema, No calf tenderness   Neurologic Exam -    Stable                Cognitive - Awake, Alert, AAO     Cranial Nerves - dysphagia     Motor - no new deficit     Psychiatric - Mood stable, Affect WNL  -    RECENT LABS/IMAGING                  MEDICATIONS   acetaminophen     Tablet .. 650 milliGRAM(s) every 6 hours PRN  acetylcysteine 20%  Inhalation 4 milliLiter(s) every 6 hours PRN  atorvastatin 20 milliGRAM(s) at bedtime  benzocaine/menthol Lozenge 1 Lozenge daily PRN  dextrose 5%. 1000 milliLiter(s) <Continuous>  dextrose 5%. 1000 milliLiter(s) <Continuous>  dextrose 50% Injectable 25 Gram(s) once  dextrose 50% Injectable 12.5 Gram(s) once  dextrose 50% Injectable 25 Gram(s) once  dextrose Oral Gel 15 Gram(s) once PRN  enoxaparin Injectable 70 milliGRAM(s) every 12 hours  escitalopram 5 milliGRAM(s) daily  fluticasone propionate 50 MICROgram(s)/spray Nasal Spray 1 Spray(s) two times a day  glucagon  Injectable 1 milliGRAM(s) once  levETIRAcetam 750 milliGRAM(s) two times a day  melatonin 6 milliGRAM(s) at bedtime  metoprolol succinate ER 25 milliGRAM(s) daily  pantoprazole    Tablet 40 milliGRAM(s) before breakfast  polyethylene glycol 3350 17 Gram(s) daily PRN  sacubitril 24 mG/valsartan 26 mG 1 Tablet(s) every 12 hours  sodium chloride 2 Gram(s) every 6 hours  sodium chloride 3%  Inhalation 4 milliLiter(s) every 6 hours PRN      --------------------------------------------------------------------

## 2023-03-27 PROBLEM — E78.5 HYPERLIPIDEMIA, UNSPECIFIED: Chronic | Status: ACTIVE | Noted: 2023-01-01

## 2023-03-27 PROBLEM — N20.0 CALCULUS OF KIDNEY: Chronic | Status: ACTIVE | Noted: 2023-01-01

## 2023-03-27 PROBLEM — D50.9 IRON DEFICIENCY ANEMIA, UNSPECIFIED: Chronic | Status: ACTIVE | Noted: 2023-01-01

## 2023-03-27 PROBLEM — D69.6 THROMBOCYTOPENIA, UNSPECIFIED: Chronic | Status: ACTIVE | Noted: 2023-01-01

## 2023-03-27 PROBLEM — E87.1 HYPO-OSMOLALITY AND HYPONATREMIA: Chronic | Status: ACTIVE | Noted: 2023-01-01

## 2023-03-27 PROBLEM — N40.0 BENIGN PROSTATIC HYPERPLASIA WITHOUT LOWER URINARY TRACT SYMPTOMS: Chronic | Status: ACTIVE | Noted: 2023-01-01

## 2023-03-27 NOTE — PROGRESS NOTE ADULT - SUBJECTIVE AND OBJECTIVE BOX
Patient is a 68y old  Male who presents with a chief complaint of Glioblastoma (26 Mar 2023 12:00)    Patient seen and examined at bedside. episode of chest discomfort overnight; now resolved. +cough but denies shortness of breath    ALLERGIES:  amoxicillin (Rash)    MEDICATIONS  (STANDING):  atorvastatin 20 milliGRAM(s) Oral at bedtime  dextrose 5%. 1000 milliLiter(s) (50 mL/Hr) IV Continuous <Continuous>  dextrose 5%. 1000 milliLiter(s) (100 mL/Hr) IV Continuous <Continuous>  dextrose 50% Injectable 25 Gram(s) IV Push once  dextrose 50% Injectable 12.5 Gram(s) IV Push once  dextrose 50% Injectable 25 Gram(s) IV Push once  enoxaparin Injectable 70 milliGRAM(s) SubCutaneous every 12 hours  escitalopram 5 milliGRAM(s) Oral daily  fluticasone propionate 50 MICROgram(s)/spray Nasal Spray 1 Spray(s) Both Nostrils two times a day  glucagon  Injectable 1 milliGRAM(s) IntraMuscular once  levETIRAcetam 750 milliGRAM(s) Oral two times a day  melatonin 6 milliGRAM(s) Oral at bedtime  metoprolol succinate ER 25 milliGRAM(s) Oral daily  pantoprazole    Tablet 40 milliGRAM(s) Oral before breakfast  sacubitril 24 mG/valsartan 26 mG 1 Tablet(s) Oral every 12 hours  sodium chloride 2 Gram(s) Oral every 6 hours    MEDICATIONS  (PRN):  acetaminophen     Tablet .. 650 milliGRAM(s) Oral every 6 hours PRN Temp greater or equal to 38C (100.4F), Mild Pain (1 - 3)  acetylcysteine 20%  Inhalation 4 milliLiter(s) Inhalation every 6 hours PRN secretions  benzocaine/menthol Lozenge 1 Lozenge Oral daily PRN Sore Throat  calcium carbonate    500 mG (Tums) Chewable 2 Tablet(s) Chew every 4 hours PRN Heartburn  dextrose Oral Gel 15 Gram(s) Oral once PRN Blood Glucose LESS THAN 70 milliGRAM(s)/deciliter  polyethylene glycol 3350 17 Gram(s) Oral daily PRN Constipation  sodium chloride 3%  Inhalation 4 milliLiter(s) Inhalation every 6 hours PRN secretions    Vital Signs Last 24 Hrs  T(F): 97.9 (27 Mar 2023 07:50), Max: 98.5 (26 Mar 2023 20:16)  HR: 83 (27 Mar 2023 07:50) (73 - 88)  BP: 121/72 (27 Mar 2023 07:50) (106/82 - 124/77)  RR: 16 (27 Mar 2023 07:50) (16 - 16)  SpO2: 97% (27 Mar 2023 07:50) (97% - 100%)  I&O's Summary    26 Mar 2023 07:01  -  27 Mar 2023 07:00  --------------------------------------------------------  IN: 480 mL / OUT: 600 mL / NET: -120 mL    BMI (kg/m2): 24.6 (03-25-23 @ 12:45)    PHYSICAL EXAM:  General: NAD, A/O x 3  ENT: MMM, no tonsilar exudate  Neck: +trach  Lungs: equal air entry bilaterally. faint crackles right base   Cardio: RRR, S1/S2, No murmurs  Abdomen: Soft, Nontender, Nondistended; Bowel sounds present  Extremities: No calf tenderness, No pitting edema    LABS:                        10.9   5.65  )-----------( 113      ( 27 Mar 2023 06:10 )             33.1       03-27    134  |  97  |  11  ----------------------------<  132  3.9   |  30  |  0.90    Ca    8.7      27 Mar 2023 06:10    TPro  7.0  /  Alb  3.2  /  TBili  0.5  /  DBili  x   /  AST  15  /  ALT  28  /  AlkPhos  73  03-27     CARDIAC MARKERS ( 27 Mar 2023 06:10 )  x     / 10.5 ng/L / 30 U/L / x     / 0.5 ng/mL    COVID-19 PCR: NotDetec (03-21-23 @ 19:45)  COVID-19 PCR: NotDetec (03-18-23 @ 05:38)    RADIOLOGY & ADDITIONAL TESTS:     Care Discussed with Consultants/Other Providers:

## 2023-03-27 NOTE — PROVIDER CONTACT NOTE (OTHER) - ASSESSMENT
Alert, verbally responsive, with trache to trache collar at 28% FIO2, breathing regular and unlabored. VSS. No nausea, no vomiting, denies any abdominal pain.

## 2023-03-27 NOTE — PROGRESS NOTE ADULT - ASSESSMENT
67 yo M with PMH of Hypertension, Type 2 Diabetes Mellitus, Hyperlipidemia Anemia, tracheal stenosis s/p tracheostomy, glioblastoma who presented to [facility] 3/7 for cerebral angiogram with Avastin treatment. Course complicated by metabolic acidosis, acute hypoxic respiratory failure requiring mechanical ventilation, takotsubo cardiomyopathy, A-fib, chronic DVT, dysphagia. Weaned off vent. Now admitted to [facility] for rehab.    # Glioblastoma   - PT/OT/ST per PMR  - Pain management per PMR  - Keppra 750mg BID    # Stress induced cardiomyopathy (Takotsubo Cardiomyopathy)  - EF improved per echo at [facility] - most recently 35-40%  - euvolemic on exam - continue lasix prn based on volume statis  - GDMT - continue entrsto 24/26mg bid, Toprol 25mg daily with holding parameters     # Tracheal stenosis s/p trach  - ENT eval noted  - trach care    # Hyponatremia   - Na 134 on recent labs  - on NaCl tab 2g q6h. plan to taper down as tolerated gradually and cautiously given low EF    # Type 2 Diabetes Mellitus  - HbA1C 5.8%  - Hold home oral med - Januvia  - FS and JANIE    # DVT  - chronic   - Lovenox BID    DVT Prophylaxis:  - Lovenox

## 2023-03-27 NOTE — PROVIDER CONTACT NOTE (OTHER) - ACTION/TREATMENT ORDERED:
Dr. Phillips notified. EKG and blood works Cardiac Enzymes ordered- done. As per pt, after taking 2 tabs of Calcium Carbonate last night , he felt relief. 2 tabs Calcium Carbonate given as ordered.

## 2023-03-27 NOTE — PROGRESS NOTE ADULT - ASSESSMENT
69 YO right handed male with PMH of DM II, HLD, JAGDISH, hyponatremia, tracheal stenosis s/p tracheostomy, and glioblastoma who presented to [facility] on 3/7  for cerebral angiogram with Avastin treatment. Course s/f  metabolic acidosis, desaturation requiring vent support, PNA, stress induced cardiomyopathy -Takotsubo cardiomyopathy, thrombocytopenia, chronic DVT, and dysphagia. Patient now with gait Instability, ADL impairments and Functional impairments.    #GBM  - s/p cerebral angiogram with Avastin treatment  - Start Comprehensive Rehab Program: PT/OT/ST, 3hours daily and 5 days weekly  - PT: Focused on improving strength, endurance, coordination, balance, functional mobility, and transfers  - OT: Focused on improving strength, fine motor skills, coordination, posture and ADLs.    - ST: to diagnose and treat deficits in swallowing, cognition and communication.   - Keppra 750mg BID    #Respiratory Failure  - metabolic acidosis  - tracheostomy +  PMV  - trach exchanged to 6 shiley cuffless 3/16 by ENT  - mucomyst, sodium [id number]    - Pulm consult appreciated - rec ENT   - ENT Consult appreciated - outpatient follow up with Dr. Caba [phone]    #Takotsubo Cardiomyopathy  - Entresto 24-26mg q 12 hrs  - Metoprolol XL 50mg daily  - EF 20%    #HLD  - Lipitor 20mg daily    #Hyponatremia  - Sodium tabs 2gram Q 6 hrs   - 134 (3/27)    #PNA  -S/p Cefepime 3/13-3/18    #DM II  - ISS and FS - DC  - A1c 5.8 on 3/8  - FS controlled < 150 (3/23) - DC FS, monitor via labs. 132 (3/27)    #Pain management  - Tylenol PRN    #DVT  - Chronic R IM calf DVT  - Lovenox BID    #GI ppx  - Protonix 40mg    #Bowel Regimen  - miralax PRN  - monitor    #Bladder management  - BS on admission, and q 8 hours (SC if > 400) - <300 PVR, have not required straight cath  - Toileting program  - Monitor UO    #FEN   - Diet: consistent carb with evening snacks    #Skin:  - Skin on admission: Intact  - Pressure injury/Skin: Turn Q2hrs while in bed, OOB to Chair, PT/OT     #Dysphagia    - SLP: evaluation and treatment    #Sleep:   - Maintain quiet hours and low stim environment.  - Melatonin PRN to maximize participation in therapy during the day.     #Precaution  - Fall, Aspiration, cardiac, Seizure    #GOC  CODE STATUS: FULL CODE    IDT 3/27  lives with a family friend in a apt.  Has rollator and shower chair at home.  PTA was independent with trach care.  Son-in-law said pt was approved for 24 hr aide, but never used it  H'es overall SV with therap, ambulated around unit without AD, done 1 flight of stairs without HR.  On reg/thin diet, Mild-mod cog deficits, high level skill deficits  Goals: SV during awake hours.  Will need assist with IADLs  TDD: 3/30 home, will arrange for family training    Outpatient Follow-up (Specialty/Name of physician):    Trevin Ho)  Neurosurgery  [address], [address] [place] [address]  Phone: [phone]  Fax: [phone]    Chapin Hoang)  EndocrinologyMetabDiabetes; Internal Medicine  [address] [address]  Phone: [phone]  Fax: [phone]    Ann Guerrero)  Cardiovascular Disease; Internal Medicine  [address], [address]  [place] [address]  Phone: [phone]  Fax: [phone]    Lizzy Houser)  HematologyOncology; Internal Medicine  [address] [address]  Phone: [phone]  Fax: [phone]    Markel Lopez)  Otolaryngology  [address] [place] [address]  Phone: [phone]  Fax: [phone]    Isabel Chiang  CARDIOLOGY  [address] [address]  Phone: [phone]  Fax: [phone]    Hao Caba  [facility]  OTOLARYNG [address] [facility]  Scheduled Appointment: 04/18/2023    Vaughn Mitchell  [facility]  CARDIOLOGY [address] [place]   Scheduled Appointment: 05/16/2023 69 YO RHD male with PMH of DM II, HLD, JAGDISH, hyponatremia, tracheal stenosis s/p TRACH, and glioblastoma who presented to [facility] on 3/7/23  for cerebral angiogram with Avastin treatment. Course s/f  metabolic acidosis, desaturation requiring vent support, PNA, stress induced cardiomyopathy -Takotsubo cardiomyopathy, thrombocytopenia, chronic DVT, and dysphagia.     # GBM s/p cerebral angiogram with Avastin treatment  - Keppra 750mg BID  - continue Comprehensive Rehab Program: PT/OT/ST, 3hours daily and 5 days weekly  - Precautions: DM, SZ, respiratory/TRACH, fall, CA, VTE    # Respiratory Failure  - trach exchanged to 6 shiley cuffless 3/16 by ENT  - mucomyst, sodium inh  - PMV, no capping  -  outpatient ENT follow up with Dr. Caba [phone]    # Takotsubo Cardiomyopathy  - EF 20%  - Entresto 24-26mg q 12 hrs  - Metoprolol XL 50mg daily  - CXR obtained 3/27 due to increased cough, chest discomfort. Official read not in yet, appears stable/improved from prior Xray 3/20    # PNA  -S/p Cefepime 3/13-3/18    # HLD  - Lipitor 20mg daily    #Hyponatremia  - Sodium tabs 2gram Q 6 hrs   - 134 (3/27). BMp 330    #DM II  - ISS and FS - DC  - A1c 5.8 on 3/8  - FS controlled < 150 (3/23) - DC FS, monitor via labs. 132 (3/27)    # Pain management  - Tylenol PRN    # GI ppx  - Protonix 40mg  - miralax PRN  - monitor    # FEN   - Diet: consistent carb with evening snacks    # DVT  - Chronic R IM calf DVT  - Lovenox BID    # Case discussed in IDT rounds 3/27  - lives with a family friend in a apt.  Has rollator and shower chair at home.  PTA was independent with trach care.  Son-in-law said pt was approved for 24 hr aide, but never used it, supervision ambulation and 1 flight of stairs without HR.  On reg/thin diet, Mild-mod cog deficits, high level skill deficits. Increase SLP 90 min, reduce PT 30  - Goals: SV during awake hours.  Will need assist with IADLs  - caregiver training  - TDD: 3/30 home, will arrange for family training    Outpatient Follow-up (Specialty/Name of physician):    Trevin Ho (MD)  Neurosurgery  [address], [address] [place] [address]  Phone: [phone]  Fax: [phone]    Chapin Hoang)  EndocrinologyMetabDiabetes; Internal Medicine  [address] [address]  Phone: [phone]  Fax: [phone]    Ann Guerrero)  Cardiovascular Disease; Internal Medicine  [address], [address]  [place] [address]  Phone: [phone]  Fax: [phone]    Lizzy Houser)  HematologyOncology; Internal Medicine  [address] [address]  Phone: [phone]  Fax: [phone]    Markel Lopez)  Otolaryngology  [address] [place] [address]  Phone: [phone]  Fax: [phone]    Isabel Chiang  CARDIOLOGY  [address] [address]  Phone: [phone]  Fax: [phone]    Hao Caba  [facility]  OTOLARYNG 444 [facility]  Scheduled Appointment: 04/18/2023    Vaughn Mitchell  [facility]  CARDIOLOGY [address] [place]   Scheduled Appointment: 05/16/2023 67 YO RHD male with PMH of DM II, HLD, JAGDISH, hyponatremia, tracheal stenosis s/p TRACH, and glioblastoma who presented to [facility] on 3/7/23  for cerebral angiogram with Avastin treatment. Course s/f  metabolic acidosis, desaturation requiring vent support, PNA, stress induced cardiomyopathy -Takotsubo cardiomyopathy, thrombocytopenia, chronic DVT, and dysphagia.     # GBM s/p cerebral angiogram with Avastin treatment  - Keppra 750mg BID  - continue Comprehensive Rehab Program: PT/OT/ST, 3hours daily and 5 days weekly  - Precautions: DM, SZ, respiratory/TRACH, fall, CA, VTE    # Respiratory Failure  - trach exchanged to 6 shiley cuffless 3/16 by ENT  - mucomyst, sodium inh  - PMV, no capping  -  outpatient ENT follow up with Dr. Caba [phone]    # Takotsubo Cardiomyopathy  - EF 20%  - Entresto 24-26mg q 12 hrs  - Metoprolol XL 50mg daily  - chest discomfort: EKG and cardiac enzymes without sig abnl, ABIEL note read and appreciated  - CXR obtained 3/27 due to increased cough, chest discomfort. Official read not in yet, appears stable/improved from prior Xray 3/20    # PNA  -S/p Cefepime 3/13-3/18    # HLD  - Lipitor 20mg daily    #Hyponatremia  - Sodium tabs 2gram Q 6 hrs   - 134 (3/27). BMp 330    #DM II  - ISS and FS - DC  - A1c 5.8 on 3/8  - FS controlled < 150 (3/23) - DC FS, monitor via labs. 132 (3/27)    # Pain management  - Tylenol PRN    # GI ppx  - Protonix 40mg  - miralax PRN  - monitor    # FEN   - Diet: consistent carb with evening snacks    # DVT  - Chronic R IM calf DVT  - Lovenox BID    # Case discussed in IDT rounds 3/27  - lives with a family friend in a apt.  Has rollator and shower chair at home.  PTA was independent with trach care.  Son-in-law said pt was approved for 24 hr aide, but never used it, supervision ambulation and 1 flight of stairs without HR.  On reg/thin diet, Mild-mod cog deficits, high level skill deficits. Increase SLP 90 min, reduce PT 30  - Goals: SV during awake hours.  Will need assist with IADLs  - caregiver training  - TDD: 3/30 home, will arrange for family training    Outpatient Follow-up (Specialty/Name of physician):    Trevin Ho)  Neurosurgery  [address], [address] [place] [address]  Phone: [phone]  Fax: [phone]    Chapin Hoang)  EndocrinologyMetabDiabetes; Internal Medicine  [address] [address]  Phone: [phone]  Fax: [phone]    Ann Guerrero)  Cardiovascular Disease; Internal Medicine  [address], [address]  [place] [address]  Phone: [phone]  Fax: [phone]    Lizzy Houser)  HematologyOncology; Internal Medicine  [address] [address]  Phone: [phone]  Fax: [phone]    Markel Lopez)  Otolaryngology  [address] [place] [address]  Phone: [phone]  Fax: [phone]    Isabel Chiang  CARDIOLOGY  [address] [address]  Phone: [phone]  Fax: [phone]    Hao Caba  [facility]  OTOLARYNG 444 [facility]  Scheduled Appointment: 04/18/2023    Vaughn Mitchell  [facility]  CARDIOLOGY [address] [place]   Scheduled Appointment: 05/16/2023

## 2023-03-27 NOTE — PROVIDER CONTACT NOTE (OTHER) - ASSESSMENT
Alert, verbally responsive, c/o pain, pointing on his sternum, chest area, non-radiating pain. No nausea, no vomiting, no abdominal pain. Trache to trache collar in RA with PMV, breathing regular and unlabored.

## 2023-03-27 NOTE — PROVIDER CONTACT NOTE (OTHER) - ACTION/TREATMENT ORDERED:
PRN dose of Maalox 30 ml given with little relief. Dr. Phillips notified. Stat dose of Calcium Carbonate 2 tabs given. HOB elevated, pt claimes he feels better if he's on sitting/ high fowlers position.

## 2023-03-27 NOTE — PROGRESS NOTE ADULT - NS ATTEND AMEND GEN_ALL_CORE FT
Progress note amended to include my discussions with the patient, NP, hospitalist, RN, SW, PT, and my findings.     Patient seen with OT at bedside. had completed session, complains of mild chest discomfort, not reproducible on palpation over sternum or costochondral joints. Had cardiac workup including EKG and troponin yesterday which was negative; resulta discussed with patient. O2 sat 96% with TRACH uncapped, uses PMV PRN and independent in donning/doffing cap per team. Given history of CMP and pulmonary edema, repeat CXR performed although lung exam clear with reduced inspiratory effort, no rhonchi or wheezing, as patient also complains of increased cough.congestion (expectorant whitish.clear). CXR prelim done, compared to 3/20 by me, appears stable withotu any effusion and less interstitial prominence. Await official read. Afebrile, no leukocystosis WBC 5.65.     Has been progressing well in therapy. Currently requires supervision transfers and bADLs, CG showering, supervision ambulation with and without assistive device. will need supervision due to cognition during daytime hours at home. Will adjust intesnity of SLP and PT to 90 /30 min each, continue OT 60 min, with target dc home 3/30 to family with home care referral PT OT SLP. Will also arrange for caregiver training. continue program

## 2023-03-27 NOTE — PROGRESS NOTE ADULT - SUBJECTIVE AND OBJECTIVE BOX
Patient is a 68y old  Male who presents with a chief complaint of Glioblastoma (22 Mar 2023 13:33)    HPI:  This is a 67 YO right handed male with PMH of DM II, HLD, JAGDISH, hyponatremia, tracheal stenosis s/p tracheostomy, and glioblastoma who presented to [facility] on 3/7  for cerebral angiogram with Avastin treatment.    Per daughter, symptoms started in January 2022 when patient had onset of confusion, dizziness, and speech difficulty while in [place] and was diagnosed with brain mass. Pt evaluated upon return [place] the [place] at [facility] ED with CT head revealing left frontal brain mass. Pt underwent resection of left frontal enhancing tumor with GLEOLAN placed 1/27/2022. Pathology showed gliosarcoma, WHO grade IV, IDH wild-type, EGFR non amplified, MGMT promotor methylated. Pt was discharged to rehab and underwent radiation treatment and Temodar chemotherapy (completed 12/22). MRI 12/22 showed recurrence of brain mass.    The patient presented to [facility] on 3/7 referred by Dr. Mackenzie for trial participation cerebral angiogram with Avastin treatment. Post op in cath lab hypertensive, +flash pulmonary edema with desaturation, given diuretics. He developed new global aphasia and right sided weakness on 3/8. Hospital course further significant for metabolic acidosis, desaturation requiring vent support, PNA, stress induced cardiomyopathy (Takotsubo cardiomyopathy, reduced but stable EF at 20%), thrombocytopenia, chronic DVT. NGT placed and removed on 3/19, PMV per s&s.    Patient was evaluated by PM&R and therapy for functional deficits, gait/ADL impairments and acute rehabilitation was recommended. Patient was medically optimized for discharge to [facility] IRU on 3/21/23.      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 1000 milliLiter(s) (50 mL/Hr) IV Continuous <Continuous>  dextrose 5%. 1000 milliLiter(s) (100 mL/Hr) IV Continuous <Continuous>  dextrose 50% Injectable 25 Gram(s) IV Push once  dextrose 50% Injectable 12.5 Gram(s) IV Push once  dextrose 50% Injectable 25 Gram(s) IV Push once  enoxaparin Injectable 70 milliGRAM(s) SubCutaneous every 12 hours  escitalopram 5 milliGRAM(s) Oral daily  fluticasone propionate 50 MICROgram(s)/spray Nasal Spray 1 Spray(s) Both Nostrils two times a day  glucagon  Injectable 1 milliGRAM(s) IntraMuscular once  levETIRAcetam 750 milliGRAM(s) Oral two times a day  melatonin 6 milliGRAM(s) Oral at bedtime  metoprolol succinate ER 25 milliGRAM(s) Oral daily  pantoprazole    Tablet 40 milliGRAM(s) Oral before breakfast  sacubitril 24 mG/valsartan 26 mG 1 Tablet(s) Oral every 12 hours  sodium chloride 2 Gram(s) Oral every 6 hours    MEDICATIONS  (PRN):  acetaminophen     Tablet .. 650 milliGRAM(s) Oral every 6 hours PRN Temp greater or equal to 38C (100.4F), Mild Pain (1 - 3)  acetylcysteine 20%  Inhalation 4 milliLiter(s) Inhalation every 6 hours PRN secretions  benzocaine/menthol Lozenge 1 Lozenge Oral daily PRN Sore Throat  calcium carbonate    500 mG (Tums) Chewable 2 Tablet(s) Chew every 4 hours PRN Heartburn  dextrose Oral Gel 15 Gram(s) Oral once PRN Blood Glucose LESS THAN 70 milliGRAM(s)/deciliter  polyethylene glycol 3350 17 Gram(s) Oral daily PRN Constipation  sodium chloride 3%  Inhalation 4 milliLiter(s) Inhalation every 6 hours PRN secretions    Allergies  amoxicillin (Rash)  Intolerances      PHYSICAL EXAM  68y  Vital Signs Last 24 Hrs  T(C): 36.6 (03-27-23 @ 07:50), Max: 36.9 (03-26-23 @ 20:16)  T(F): 97.9 (03-27-23 @ 07:50), Max: 98.5 (03-26-23 @ 20:16)  HR: 83 (03-27-23 @ 07:50) (73 - 88)  BP: 121/72 (03-27-23 @ 07:50) (106/82 - 124/77)  RR: 16 (03-27-23 @ 07:50) (16 - 16)  SpO2: 97% (03-27-23 @ 07:50) (97% - 100%)      RECENT LABS:       LAB                        10.9   5.65  )-----------( 113      ( 27 Mar 2023 06:10 )             33.1     03-27    134<L>  |  97  |  11  ----------------------------<  132<H>  3.9   |  30  |  0.90    Ca    8.7      27 Mar 2023 06:10    TPro  7.0  /  Alb  3.2<L>  /  TBili  0.5  /  DBili  x   /  AST  15  /  ALT  28  /  AlkPhos  73  03-27    LIVER FUNCTIONS - ( 27 Mar 2023 06:10 )  Alb: 3.2 g/dL / Pro: 7.0 g/dL / ALK PHOS: 73 U/L / ALT: 28 U/L / AST: 15 U/L / GGT: x             CARDIAC MARKERS ( 27 Mar 2023 06:10 )  x     / x     / 30 U/L / x     / 0.5 ng/mL      Review of Systems:   · Additional ROS	Patient seen and evaluated while sitting up in WC at bedside. Had 2 episodes of epigastric pain yesterday - described as a burning/stinging at times. EKG obtained both times (unchanged), and cardiac enzymes also done (negative). Chronic cough feels slight worse -> productive now with thick clear expectorant.  Denies fever, chills, SOB, HA, nausea, abd pain, or dysuria.  Tolerating therapy, SBP 98 with OT, denies dizziness or any symptoms.     Physical Exam:   · Constitutional	well-groomed  · Eyes	PERRL; EOMI; conjunctiva clear  · ENMT Comments	+trach, uncapped during encourter  · Respiratory	clear to auscultation bilaterally; no wheezes; no rales; no rhonchi.    No tenderness over sternum.  CP nonreproducible   · Cardiovascular	regular rate and rhythm; S1 S2 present; no murmur  · Gastrointestinal	soft; nontender; nondistended; normal active bowel sounds  · Neurological	cranial nerves II-XII intact; sensation intact; responds to verbal commands  · Motor	5/5 to all extremities  coordination intact  sensation intact  · Psychiatric	normal affect       Patient is a 68y old  Male who presents with a chief complaint of Glioblastoma (22 Mar 2023 13:33)    HPI:  This is a 69 YO right handed male with PMH of DM II, HLD, JAGDISH, hyponatremia, tracheal stenosis s/p tracheostomy, and glioblastoma who presented to [facility] on 3/7  for cerebral angiogram with Avastin treatment.    Per daughter, symptoms started in January 2022 when patient had onset of confusion, dizziness, and speech difficulty while in [place] and was diagnosed with brain mass. Pt evaluated upon return to the [place] at [facility] ED with CT head revealing left frontal brain mass. Pt underwent resection of left frontal enhancing tumor with GLEOLAN placed 1/27/2022. Pathology showed gliosarcoma, WHO grade IV, IDH wild-type, EGFR non amplified, MGMT promotor methylated. Pt was discharged to rehab and underwent radiation treatment and Temodar chemotherapy (completed 12/22). MRI 12/22 showed recurrence of brain mass.    The patient presented to [facility] on 3/7 referred by Dr. Mackenzie for trial participation cerebral angiogram with Avastin treatment. Post op in cath lab hypertensive, +flash pulmonary edema with desaturation, given diuretics. He developed new global aphasia and right sided weakness on 3/8. Hospital course further significant for metabolic acidosis, desaturation requiring vent support, PNA, stress induced cardiomyopathy (Takotsubo cardiomyopathy, reduced but stable EF at 20%), thrombocytopenia, chronic DVT. NGT placed and removed on 3/19, PMV per s&s.    Patient was evaluated by PM&R and therapy for functional deficits, gait/ADL impairments and acute rehabilitation was recommended. Patient was medically optimized for discharge to [facility] IRU on 3/21/23.      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 1000 milliLiter(s) (50 mL/Hr) IV Continuous <Continuous>  dextrose 5%. 1000 milliLiter(s) (100 mL/Hr) IV Continuous <Continuous>  dextrose 50% Injectable 25 Gram(s) IV Push once  dextrose 50% Injectable 12.5 Gram(s) IV Push once  dextrose 50% Injectable 25 Gram(s) IV Push once  enoxaparin Injectable 70 milliGRAM(s) SubCutaneous every 12 hours  escitalopram 5 milliGRAM(s) Oral daily  fluticasone propionate 50 MICROgram(s)/spray Nasal Spray 1 Spray(s) Both Nostrils two times a day  glucagon  Injectable 1 milliGRAM(s) IntraMuscular once  levETIRAcetam 750 milliGRAM(s) Oral two times a day  melatonin 6 milliGRAM(s) Oral at bedtime  metoprolol succinate ER 25 milliGRAM(s) Oral daily  pantoprazole    Tablet 40 milliGRAM(s) Oral before breakfast  sacubitril 24 mG/valsartan 26 mG 1 Tablet(s) Oral every 12 hours  sodium chloride 2 Gram(s) Oral every 6 hours    MEDICATIONS  (PRN):  acetaminophen     Tablet .. 650 milliGRAM(s) Oral every 6 hours PRN Temp greater or equal to 38C (100.4F), Mild Pain (1 - 3)  acetylcysteine 20%  Inhalation 4 milliLiter(s) Inhalation every 6 hours PRN secretions  benzocaine/menthol Lozenge 1 Lozenge Oral daily PRN Sore Throat  calcium carbonate    500 mG (Tums) Chewable 2 Tablet(s) Chew every 4 hours PRN Heartburn  dextrose Oral Gel 15 Gram(s) Oral once PRN Blood Glucose LESS THAN 70 milliGRAM(s)/deciliter  polyethylene glycol 3350 17 Gram(s) Oral daily PRN Constipation  sodium chloride 3%  Inhalation 4 milliLiter(s) Inhalation every 6 hours PRN secretions    Allergies  amoxicillin (Rash)  Intolerances      PHYSICAL EXAM  68y  Vital Signs Last 24 Hrs  T(C): 36.6 (03-27-23 @ 07:50), Max: 36.9 (03-26-23 @ 20:16)  T(F): 97.9 (03-27-23 @ 07:50), Max: 98.5 (03-26-23 @ 20:16)  HR: 83 (03-27-23 @ 07:50) (73 - 88)  BP: 121/72 (03-27-23 @ 07:50) (106/82 - 124/77)  RR: 16 (03-27-23 @ 07:50) (16 - 16)  SpO2: 97% (03-27-23 @ 07:50) (97% - 100%)      RECENT LABS:       LAB                        10.9   5.65  )-----------( 113      ( 27 Mar 2023 06:10 )             33.1     03-27    134<L>  |  97  |  11  ----------------------------<  132<H>  3.9   |  30  |  0.90    Ca    8.7      27 Mar 2023 06:10    TPro  7.0  /  Alb  3.2<L>  /  TBili  0.5  /  DBili  x   /  AST  15  /  ALT  28  /  AlkPhos  73  03-27    LIVER FUNCTIONS - ( 27 Mar 2023 06:10 )  Alb: 3.2 g/dL / Pro: 7.0 g/dL / ALK PHOS: 73 U/L / ALT: 28 U/L / AST: 15 U/L / GGT: x             CARDIAC MARKERS ( 27 Mar 2023 06:10 )  x     / x     / 30 U/L / x     / 0.5 ng/mL      Review of Systems:   · Additional ROS	Patient seen and evaluated while sitting up in WC at bedside at end of OT session. Had 2 episodes of epigastric pain yesterday - described as a burning/stinging at times. EKG obtained both times (unchanged), and cardiac enzymes also done (negative).    Has  Chronic cough but feels slight worse -> productive now with thick clear/whitish expectorant.  Denies fever, chills, SOB, HA, nausea, abd pain, or dysuria.  Tolerating therapy, SBP 98 with OT, denies dizziness or any symptoms.     Physical Exam:   · Constitutional	well-groomed Sustained eye opening  	  · ENMT Comments	+trach, uncapped during encourter O2 sat 96% RA no distress  · Respiratory	clear to auscultation bilaterally; no wheezes; no rales; no rhonchi.    No tenderness over sternum.  CP nonreproducible   · Cardiovascular	regular rate and rhythm; S1 S2 present; no murmur  · Gastrointestinal	soft; nontender; nondistended; normal active bowel sounds  	  · Motor	5/5 to all extremities  coordination intact  sensation intact

## 2023-03-28 NOTE — DISCHARGE NOTE PROVIDER - NSDCCAREPROVSEEN_GEN_ALL_CORE_FT
Vianey Correa Sunny, Vianey Morel, Heber Armijo, Martha Martell Sunny, Vianey Morel, Heber Valles, Martha Pandya, Da

## 2023-03-28 NOTE — DISCHARGE NOTE PROVIDER - CARE PROVIDER_API CALL
Trevin Ho)  Neurosurgery  [address], [address] [place] [address]  Phone: [phone]  Fax: [phone]  Follow Up Time: 1 week    Chapin Hoang)  EndocrinologyMetabDiabetes; Internal Medicine  [address] [address]  Phone: [phone]  Fax: [phone]  Follow Up Time: 2 weeks    Ann Guerrero)  Cardiovascular Disease; Internal Medicine  [address], [address]  [place] [address]  Phone: [phone]  Fax: [phone]  Follow Up Time: 2 weeks    Lizzy Houser)  HematologyOncology; Internal Medicine  [address] [address]  Phone: [phone]  Fax: [phone]  Follow Up Time: 2 weeks    Isabel Chiang  CARDIOLOGY  [address] [address]  Phone: [phone]  Fax: [phone]  Follow Up Time: 1 week    Hao Caba)  Otolaryngology  [address] [place] [address]  Phone: [phone]  Fax: [phone]  Scheduled Appointment: 04/18/2023    Vianey Correa  Physical Medicine and Rehabilitation  [address] [place] [address]  Phone: [phone]  Fax: [phone]  Follow Up Time: 1 month

## 2023-03-28 NOTE — DISCHARGE NOTE PROVIDER - NSDCMRMEDTOKEN_GEN_ALL_CORE_FT
acetaminophen 325 mg oral tablet: 2 tab(s) orally every 6 hours, As needed, Temp greater or equal to 38C (100.4F), Mild Pain (1 - 3)  acetylcysteine 20% inhalation solution: 4 milliliter(s) inhaled every 6 hours, As needed, secretions  atorvastatin 20 mg oral tablet: 1 tab(s) orally once a day (at bedtime)  enoxaparin: 75 milligram(s) subcutaneous 2 times a day  escitalopram 5 mg oral tablet: 1 tab(s) orally once a day  fluticasone 50 mcg/inh nasal spray: 1 spray(s) nasal 2 times a day  ipratropium-albuterol 0.5 mg-2.5 mg/3 mL inhalation solution: 3 milliliter(s) inhaled every 6 hours, As needed, Shortness of Breath and/or Wheezing  Januvia 100 mg oral tablet: 1 tab(s) orally once a day   levETIRAcetam 750 mg oral tablet: 1 tab(s) orally 2 times a day  melatonin 5 mg oral tablet: 1 tab(s) orally once a day (at bedtime)  menthol-benzocaine topical: 1 lozenge orally once a day  metoprolol succinate 50 mg oral tablet, extended release: 1 tab(s) orally once a day  ondansetron 2 mg/mL injectable solution: 4 milligram(s) injectable every 8 hours, As Needed  sacubitril-valsartan 24 mg-26 mg oral tablet: 1 tab(s) orally every 12 hours  sodium chloride 1 g oral tablet: 2 tab(s) orally every 6 hours  sodium chloride 3% inhalation solution: 4 milliliter(s) inhaled every 6 hours, As needed, secretions   acetaminophen 325 mg oral tablet: 2 tab(s) orally every 6 hours, As needed, Temp greater or equal to 38C (100.4F), Mild Pain (1 - 3)  acetaminophen 325 mg oral tablet: 2 tab(s) orally every 6 hours As needed Temp greater or equal to 38C (100.4F), Mild Pain (1 - 3)  acetylcysteine 20% inhalation solution: 4 milliliter(s) inhaled every 6 hours, As needed, secretions  atorvastatin 20 mg oral tablet: 1 tab(s) orally once a day (at bedtime)  calcium carbonate 500 mg (200 mg elemental calcium) oral tablet, chewable: 2 tab(s) orally every 4 hours As needed Heartburn  enoxaparin: 75 milligram(s) subcutaneous 2 times a day  escitalopram 5 mg oral tablet: 1 tab(s) orally once a day  fluticasone 50 mcg/inh nasal spray: 1 spray(s) nasal 2 times a day  ipratropium-albuterol 0.5 mg-2.5 mg/3 mL inhalation solution: 3 milliliter(s) inhaled every 6 hours, As needed, Shortness of Breath and/or Wheezing  Januvia 100 mg oral tablet: 1 tab(s) orally once a day   levETIRAcetam 750 mg oral tablet: 1 tab(s) orally 2 times a day  melatonin 5 mg oral tablet: 1 tab(s) orally once a day (at bedtime)  menthol-benzocaine topical: 1 lozenge orally once a day  metoprolol succinate 50 mg oral tablet, extended release: 1 tab(s) orally once a day  ondansetron 2 mg/mL injectable solution: 4 milligram(s) injectable every 8 hours, As Needed  sacubitril-valsartan 24 mg-26 mg oral tablet: 1 tab(s) orally every 12 hours  sodium chloride 1 g oral tablet: 2 tab(s) orally every 6 hours  sodium chloride 3% inhalation solution: 4 milliliter(s) inhaled every 6 hours, As needed, secretions   acetaminophen 325 mg oral tablet: 2 tab(s) orally every 6 hours As needed Temp greater or equal to 38C (100.4F), Mild Pain (1 - 3)  atorvastatin 20 mg oral tablet: 1 tab(s) orally once a day (at bedtime)  calcium carbonate 500 mg (200 mg elemental calcium) oral tablet, chewable: 2 tab(s) orally every 4 hours As needed Heartburn  escitalopram 5 mg oral tablet: 1 tab(s) orally once a day  fluticasone 50 mcg/inh nasal spray: 1 spray(s) nasal 2 times a day  ipratropium-albuterol 0.5 mg-2.5 mg/3 mL inhalation solution: 3 milliliter(s) inhaled every 6 hours, As needed, Shortness of Breath and/or Wheezing  levETIRAcetam 750 mg oral tablet: 1 tab(s) orally 2 times a day  melatonin 3 mg oral tablet: 2 tab(s) orally once a day (at bedtime)  menthol-benzocaine topical: 1 lozenge orally once a day  pantoprazole 40 mg oral delayed release tablet: 1 tab(s) orally once a day (before a meal)  polyethylene glycol 3350 oral powder for reconstitution: 17 gram(s) orally once a day As needed Constipation  sacubitril-valsartan 24 mg-26 mg oral tablet: 1 tab(s) orally every 12 hours  sodium chloride 1 g oral tablet: 2 tab(s) orally every 6 hours   acetaminophen 325 mg oral tablet: 2 tab(s) orally every 6 hours As needed Temp greater or equal to 38C (100.4F), Mild Pain (1 - 3)  atorvastatin 20 mg oral tablet: 1 tab(s) orally once a day (at bedtime)  calcium carbonate 500 mg (200 mg elemental calcium) oral tablet, chewable: 2 tab(s) orally every 4 hours As needed Heartburn  escitalopram 5 mg oral tablet: 1 tab(s) orally once a day  fluticasone 50 mcg/inh nasal spray: 1 spray(s) nasal 2 times a day  ipratropium-albuterol 0.5 mg-2.5 mg/3 mL inhalation solution: 3 milliliter(s) inhaled every 6 hours, As needed, Shortness of Breath and/or Wheezing  levETIRAcetam 750 mg oral tablet: 1 tab(s) orally 2 times a day  melatonin 3 mg oral tablet: 2 tab(s) orally once a day (at bedtime)  menthol-benzocaine topical: 1 lozenge orally once a day  ondansetron 4 mg oral tablet: 1 tab(s) orally once a day  pantoprazole 40 mg oral delayed release tablet: 1 tab(s) orally once a day (before a meal)  polyethylene glycol 3350 oral powder for reconstitution: 17 gram(s) orally once a day As needed Constipation  sacubitril-valsartan 24 mg-26 mg oral tablet: 1 tab(s) orally every 12 hours  sodium chloride 1 g oral tablet: 2 tab(s) orally every 6 hours   acetaminophen 325 mg oral tablet: 2 tab(s) orally every 6 hours As needed Temp greater or equal to 38C (100.4F), Mild Pain (1 - 3)  apixaban 5 mg oral tablet: 2 tab(s) orally every 12 hours then one tablet every 12 hours starting from 4/7/23  atorvastatin 20 mg oral tablet: 1 tab(s) orally once a day (at bedtime)  calcium carbonate 500 mg (200 mg elemental calcium) oral tablet, chewable: 2 tab(s) orally every 4 hours As needed Heartburn  escitalopram 5 mg oral tablet: 1 tab(s) orally once a day  fluticasone 50 mcg/inh nasal spray: 1 spray(s) nasal 2 times a day  ipratropium-albuterol 0.5 mg-2.5 mg/3 mL inhalation solution: 3 milliliter(s) inhaled every 6 hours, As needed, Shortness of Breath and/or Wheezing  levETIRAcetam 750 mg oral tablet: 1 tab(s) orally 2 times a day  melatonin 3 mg oral tablet: 2 tab(s) orally once a day (at bedtime)  menthol-benzocaine topical: 1 lozenge orally once a day  ondansetron 4 mg oral tablet: 1 tab(s) orally once a day  pantoprazole 40 mg oral delayed release tablet: 1 tab(s) orally once a day (before a meal)  polyethylene glycol 3350 oral powder for reconstitution: 17 gram(s) orally once a day As needed Constipation  sacubitril-valsartan 24 mg-26 mg oral tablet: 1 tab(s) orally every 12 hours  sodium chloride 1 g oral tablet: 2 tab(s) orally every 6 hours

## 2023-03-28 NOTE — DISCHARGE NOTE PROVIDER - NSDCFUSCHEDAPPT_GEN_ALL_CORE_FT
Brice Preciado  [facility]  INTMED [address]  Scheduled Appointment: 04/04/2023    Hoa Caba  [facility]  OTOLARYNG [address]  Scheduled Appointment: 04/25/2023    Vaguhn Mitchell  [facility]  CARDIOLOGY [address]   Scheduled Appointment: 05/16/2023

## 2023-03-28 NOTE — PROGRESS NOTE ADULT - SUBJECTIVE AND OBJECTIVE BOX
Patient is a 68y old  Male who presents with a chief complaint of Glioblastoma (27 Mar 2023 14:54)      HPI:  This is a 67 YO right handed male with PMH of DM II, HLD, JAGDISH, hyponatremia, tracheal stenosis s/p tracheostomy, and glioblastoma who presented to [facility] on 3/7  for cerebral angiogram with Avastin treatment.    Per daughter, symptoms started in January 2022 when patient had onset of confusion, dizziness, and speech difficulty while in [place] and was diagnosed with brain mass. Pt evaluated upon return to the [place] at [facility] ED with CT head revealing left frontal brain mass. Pt underwent resection of left frontal enhancing tumor with GLEOLAN placed 1/27/2022. Pathology showed gliosarcoma, WHO grade IV, IDH wild-type, EGFR non amplified, MGMT promotor methylated. Pt was discharged to rehab and underwent radiation treatment and Temodar chemotherapy (completed 12/22). MRI 12/22 showed recurrence of brain mass.    The patient presented to [facility] on 3/7 referred by Dr. Mackenzie for trial participation cerebral angiogram with Avastin treatment. Post op in cath lab hypertensive, +flash pulmonary edema with desaturation, given diuretics. He developed new global aphasia and right sided weakness on 3/8. Hospital course further significant for metabolic acidosis, desaturation requiring vent support, PNA, stress induced cardiomyopathy (Takotsubo cardiomyopathy, reduced but stable EF at 20%), thrombocytopenia, chronic DVT. NGT placed and removed on 3/19, PMV per s&s.    Patient was evaluated by PM&R and therapy for functional deficits, gait/ADL impairments and acute rehabilitation was recommended. Patient was medically optimized for discharge to [facility] IRU on 3/21/23.     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 1000 milliLiter(s) (50 mL/Hr) IV Continuous <Continuous>  dextrose 5%. 1000 milliLiter(s) (100 mL/Hr) IV Continuous <Continuous>  dextrose 50% Injectable 25 Gram(s) IV Push once  dextrose 50% Injectable 12.5 Gram(s) IV Push once  dextrose 50% Injectable 25 Gram(s) IV Push once  enoxaparin Injectable 70 milliGRAM(s) SubCutaneous every 12 hours  escitalopram 5 milliGRAM(s) Oral daily  fluticasone propionate 50 MICROgram(s)/spray Nasal Spray 1 Spray(s) Both Nostrils two times a day  glucagon  Injectable 1 milliGRAM(s) IntraMuscular once  levETIRAcetam 750 milliGRAM(s) Oral two times a day  melatonin 6 milliGRAM(s) Oral at bedtime  metoprolol succinate ER 25 milliGRAM(s) Oral daily  pantoprazole    Tablet 40 milliGRAM(s) Oral before breakfast  sacubitril 24 mG/valsartan 26 mG 1 Tablet(s) Oral every 12 hours  sodium chloride 2 Gram(s) Oral every 6 hours    MEDICATIONS  (PRN):  acetaminophen     Tablet .. 650 milliGRAM(s) Oral every 6 hours PRN Temp greater or equal to 38C (100.4F), Mild Pain (1 - 3)  acetylcysteine 20%  Inhalation 4 milliLiter(s) Inhalation every 6 hours PRN secretions  benzocaine/menthol Lozenge 1 Lozenge Oral daily PRN Sore Throat  calcium carbonate    500 mG (Tums) Chewable 2 Tablet(s) Chew every 4 hours PRN Heartburn  dextrose Oral Gel 15 Gram(s) Oral once PRN Blood Glucose LESS THAN 70 milliGRAM(s)/deciliter  polyethylene glycol 3350 17 Gram(s) Oral daily PRN Constipation  sodium chloride 3%  Inhalation 4 milliLiter(s) Inhalation every 6 hours PRN secretions      Allergies    amoxicillin (Rash)    Intolerances          VITALS  68y  Vital Signs Last 24 Hrs  T(C): 36.7 (28 Mar 2023 07:14), Max: 36.8 (27 Mar 2023 19:22)  T(F): 98.1 (28 Mar 2023 07:14), Max: 98.3 (27 Mar 2023 19:22)  HR: 83 (28 Mar 2023 08:49) (62 - 85)  BP: 108/70 (28 Mar 2023 07:14) (101/57 - 108/70)  BP(mean): --  RR: 16 (28 Mar 2023 07:14) (15 - 16)  SpO2: 99% (28 Mar 2023 08:49) (95% - 100%)    Parameters below as of 28 Mar 2023 08:49  Patient On (Oxygen Delivery Method): room air      Daily     Daily         RECENT LABS:                          10.9   5.65  )-----------( 113      ( 27 Mar 2023 06:10 )             33.1     03-27    134<L>  |  97  |  11  ----------------------------<  132<H>  3.9   |  30  |  0.90    Ca    8.7      27 Mar 2023 06:10    TPro  7.0  /  Alb  3.2<L>  /  TBili  0.5  /  DBili  x   /  AST  15  /  ALT  28  /  AlkPhos  73  03-27    LIVER FUNCTIONS - ( 27 Mar 2023 06:10 )  Alb: 3.2 g/dL / Pro: 7.0 g/dL / ALK PHOS: 73 U/L / ALT: 28 U/L / AST: 15 U/L / GGT: x               ACC: [id number] EXAM:  XR CHEST PORTABLE ROUTINE 1V   ORDERED BY: ZOLTAN MOSQUERA     PROCEDURE DATE:  03/27/2023          INTERPRETATION:  INDICATION: Malignant neoplasm of the brain. Nonspecific   chest pain    Portable chest 2:45 PM    COMPARISON: 3/20/2023    FINDINGS:  Heart/Vascular: The heart size, mediastinum, hilum and aorta are within   normal limits for projection.  Pulmonary: Midline trachea. There is no focal infiltrate, congestion or   effusion.    Bones: There is no fracture.  Lines and catheter: Tracheostomy in place.    Impression:    No acute pulmonary disease.    --- End of Report ---             ANDREW CASTREJON DO; Attending Radiologist  This document has been electronically signed. Mar 27 2023  3:12PM      CAPILLARY BLOOD GLUCOSE

## 2023-03-28 NOTE — DISCHARGE NOTE PROVIDER - NSDCCPCAREPLAN_GEN_ALL_CORE_FT
PRINCIPAL DISCHARGE DIAGNOSIS  Diagnosis: Glioblastoma  Assessment and Plan of Treatment: continue keppra for seizure prophylaxis. Follow up with your  neuro oncologist . Seek immediate medical attention for any changes in speech, vision, arm/leg weakness, balance/gait, or seizures. continue Ot PT and SLP services. Follow with PMR in 1 month      SECONDARY DISCHARGE DIAGNOSES  Diagnosis: Tracheostomy status  Assessment and Plan of Treatment: #6 Shiley. Use Passy hai valve as tolerated, and especially during meals. ENT follow up with Dr. Caba [phone]    Diagnosis: Takotsubo cardiomyopathy  Assessment and Plan of Treatment: Entresto 24-26mg q 12 hrs. Metoprolol held secondary to low blood pressures, follow with cardiology in 1-2 weeks, including clearance to restart and follow up ECHOcardiogram

## 2023-03-28 NOTE — DISCHARGE NOTE PROVIDER - CARE PROVIDERS DIRECT ADDRESSES
,[email or web address]

## 2023-03-28 NOTE — DISCHARGE NOTE PROVIDER - HOSPITAL COURSE
67 YO right handed male with PMH of DM II, HLD, JAGDISH, hyponatremia, tracheal stenosis s/p tracheostomy, and glioblastoma who presented to [facility] on 3/7  for cerebral angiogram with Avastin treatment.    Per daughter, symptoms started in January 2022 when patient had onset of confusion, dizziness, and speech difficulty while in [place] and was diagnosed with brain mass. Pt evaluated upon return to the  at [facility] ED with CT head revealing left frontal brain mass. Pt underwent resection of left frontal enhancing tumor with GLEOLAN placed 1/27/2022. Pathology showed gliosarcoma, WHO grade IV, IDH wild-type, EGFR non amplified, MGMT promotor methylated. Pt was discharged to rehab and underwent radiation treatment and Temodar chemotherapy (completed 12/22). MRI 12/22 showed recurrence of brain mass.    The patient presented to [facility] on 3/7 referred by Dr. Mackenzie for trial participation cerebral angiogram with Avastin treatment. Post op in cath lab hypertensive, +flash pulmonary edema with desaturation, given diuretics. He developed new global aphasia and right sided weakness on 3/8. Hospital course further significant for metabolic acidosis, desaturation requiring vent support, PNA, stress induced cardiomyopathy (Takotsubo cardiomyopathy, reduced but stable EF at 20%), thrombocytopenia, chronic DVT. NGT placed and removed on 3/19, PMV per s&s.    Pt was stable upon rehab admission to  [facility]. Admitted with gait instabilty, ADL, and functional impairments.     Rehab Course significant for:  - Trach since 3/2022 -> seen by pulm and ENT.  Recommend outpatient follow up with Dr. Caba 2 weeks after discharge. [phone]  - chest paint -> cardiac workup negative.  Presumed to be dyspepsia    All other medical co-morbidities were stable.     Pt tolerated course of inpatient PT/OT/SLP rehab with significant functional improvements and met rehab goals prior to discharge.    Pt was medically cleared on __3/30___  for discharged to _Home__       69 YO right handed male with PMH of DM II, HLD, JAGDISH, hyponatremia, tracheal stenosis s/p tracheostomy, and glioblastoma who presented to [facility] on 3/7  for cerebral angiogram with Avastin treatment.    Per daughter, symptoms started in January 2022 when patient had onset of confusion, dizziness, and speech difficulty while in [place] and was diagnosed with brain mass. Pt evaluated upon return to the  at [facility] ED with CT head revealing left frontal brain mass. Pt underwent resection of left frontal enhancing tumor with GLEOLAN placed 1/27/2022. Pathology showed gliosarcoma, WHO grade IV, IDH wild-type, EGFR non amplified, MGMT promotor methylated. Pt was discharged to rehab and underwent radiation treatment and Temodar chemotherapy (completed 12/22). MRI 12/22 showed recurrence of brain mass.    The patient presented to [facility] on 3/7 referred by Dr. Mackenzie for trial participation cerebral angiogram with Avastin treatment. Post op in cath lab hypertensive, +flash pulmonary edema with desaturation, given diuretics. He developed new global aphasia and right sided weakness on 3/8. Hospital course further significant for metabolic acidosis, desaturation requiring vent support, PNA, stress induced cardiomyopathy (Takotsubo cardiomyopathy, reduced but stable EF at 20%), thrombocytopenia, chronic DVT. NGT placed and removed on 3/19, PMV per s&s.    Pt was stable upon rehab admission to  [facility]. Admitted with gait instabilty, ADL, and functional impairments.     Patient has had Trach since 3/2022, seen by pulm and ENT, exchanged for  6 shiley cuffless 3/16 by ENT.  Recommend outpatient follow up with Dr. Caba 2 weeks after discharge. [phone]. Otis had episode of chest discomfort, new onset on 3/27. EKg, troponing negative. CXR 3/27: No acute pulmonary disease.., relieved with maalox. He had some coughing, which appeared more upper airway, resolved with exchange of PMV. BP borderline hypotensive, however given his history of CMP and risk for heart failure, recommended continuing metoprolol      All other medical co-morbidities were stable.     Pt tolerated course of inpatient PT/OT/SLP rehab with significant functional improvements and met rehab goals prior to discharge.    Pt was medically cleared on 3/31/23  for discharged to _Home. At the time of discharge, he required supervision for ADL, transfers, and ambulation and supervision for iADLs. Family training performed. He will receive home care referral and home Pt Ot SLP on dc      69 YO right handed male with PMH of DM II, HLD, JAGDISH, hyponatremia, tracheal stenosis s/p tracheostomy, and glioblastoma who presented to [facility] on 3/7  for cerebral angiogram with Avastin treatment.    Per daughter, symptoms started in January 2022 when patient had onset of confusion, dizziness, and speech difficulty while in [place] and was diagnosed with brain mass. Pt evaluated upon return to the  at [facility] ED with CT head revealing left frontal brain mass. Pt underwent resection of left frontal enhancing tumor with GLEOLAN placed 1/27/2022. Pathology showed gliosarcoma, WHO grade IV, IDH wild-type, EGFR non amplified, MGMT promotor methylated. Pt was discharged to rehab and underwent radiation treatment and Temodar chemotherapy (completed 12/22). MRI 12/22 showed recurrence of brain mass.    The patient presented to [facility] on 3/7 referred by Dr. Mackenzie for trial participation cerebral angiogram with Avastin treatment. Post op in cath lab hypertensive, +flash pulmonary edema with desaturation, given diuretics. He developed new global aphasia and right sided weakness on 3/8. Hospital course further significant for metabolic acidosis, desaturation requiring vent support, PNA, stress induced cardiomyopathy (Takotsubo cardiomyopathy, reduced but stable EF at 20%), thrombocytopenia, chronic DVT. NGT placed and removed on 3/19, PMV per s&s.    Pt was stable upon rehab admission to  [facility]. Admitted with gait instabilty, ADL, and functional impairments.     Patient has had Trach since 3/2022, seen by pulm and ENT, exchanged for  6 shiley cuffless 3/16 by ENT.  Recommend outpatient follow up with Dr. Caba 2 weeks after discharge. [phone]. Otsi had episode of chest discomfort, new onset on 3/27. EKg, troponin negative. CXR 3/27: No acute pulmonary disease.., relieved with maalox. He had some coughing, which appeared more upper airway, resolved with exchange of PMV. BP borderline hypotensive, metoprolol was held due to parameters and will be continued to be held on discharge, with recommendation for close cardiology follow up and ECHO, determination to restart when stable    All other medical co-morbidities were stable.     Pt tolerated course of inpatient PT/OT/SLP rehab with significant functional improvements and met rehab goals prior to discharge.    Pt was medically cleared on 3/31/23  for discharged to _Home. At the time of discharge, he required supervision for ADL, transfers, and ambulation and supervision for iADLs. Family training performed. He will receive home care referral and home Pt Ot SLP on dc

## 2023-03-28 NOTE — DISCHARGE NOTE PROVIDER - PROVIDER TOKENS
PROVIDER:[TOKEN:[[id number]],FOLLOWUP:[1 week]],PROVIDER:[TOKEN:[[id number]],FOLLOWUP:[2 weeks]],PROVIDER:[TOKEN:[[id number]],FOLLOWUP:[2 weeks]],PROVIDER:[TOKEN:[[id number]],FOLLOWUP:[2 weeks]],PROVIDER:[TOKEN:[[id number]],FOLLOWUP:[1 week]],PROVIDER:[TOKEN:[[id number]],SCHEDULEDAPPT:[04/18/2023]],PROVIDER:[TOKEN:[[id number]],FOLLOWUP:[1 month]]

## 2023-03-28 NOTE — PROGRESS NOTE ADULT - ASSESSMENT
67 YO RHD male with PMH of DM II, HLD, JAGDISH, hyponatremia, tracheal stenosis s/p TRACH, and glioblastoma who presented to [facility] on 3/7/23  for cerebral angiogram with Avastin treatment. Course s/f  metabolic acidosis, desaturation requiring vent support, PNA, stress induced cardiomyopathy -Takotsubo cardiomyopathy, thrombocytopenia, chronic DVT, and dysphagia.     # GBM s/p cerebral angiogram with Avastin treatment  - Keppra 750mg BID  - continue Comprehensive Rehab Program: PT/OT/ST, 3hours daily and 5 days weekly  - NSGY and oncology follow up on dc  - Precautions: DM, SZ, respiratory/TRACH, fall, CA, VTE    # Respiratory Failure  - trach exchanged to 6 shiley cuffless 3/16 by ENT  - mucomyst, sodium inh  - PMV, no capping  -  outpatient ENT follow up with Dr. Caba [phone]    # Takotsubo Cardiomyopathy  - EF 20%  - Entresto 24-26mg q 12 hrs  - Metoprolol XL 50mg daily  - chest discomfort: EKG and cardiac enzymes without sig abnl, ABIEL note read and appreciated  - CXR 3/27: No acute pulmonary disease. (official read)  - cardiology f/u on dc    # PNA  -S/p Cefepime 3/13-3/18    # HLD  - Lipitor 20mg daily    #Hyponatremia  - Sodium tabs 2gram Q 6 hrs   - 134 (3/27)  - PCP f./u on dc    #DM II  - ISS and FS - DC  - A1c 5.8 on 3/8  - PCP f/u on dc    # Pain management  - Tylenol PRN    # GI ppx  - Protonix 40mg  - miralax PRN  - monitor    # FEN   - Diet: consistent carb with evening snacks    # DVT  - Chronic R IM calf DVT  - Lovenox BID    # Case discussed in IDT rounds 3/27  - lives with a family friend in a apt.  Has rollator and shower chair at home.  PTA was independent with [facility] care.  Son-in-law said pt was approved for 24 hr aide, but never used it, supervision ambulation and 1 flight of stairs without HR.  On reg/thin diet, Mild-mod cog deficits, high level skill deficits. Increase SLP 90 min, reduce PT 30  - Goals: SV during awake hours.  Will need assist with IADLs  - caregiver training  - TDD: 3/30 home, will arrange for family training. Discussed with patient who is agreeable, family training taking place today 3/28    Outpatient Follow-up (Specialty/Name of physician):    Trevin Ho)  Neurosurgery  [address], [address] [place] [address]  Phone: [phone]  Fax: [phone]    Chapin Hoang)  EndocrinologyMetabDiabetes; Internal Medicine  [address] [address]  Phone: [phone]  Fax: [phone]    Ann Guerrero)  Cardiovascular Disease; Internal Medicine  [address], [address]  [place] [address]  Phone: [phone]  Fax: [phone]    Lizzy Houser)  HematologyOncology; Internal Medicine  [address] [address]  Phone: [phone]  Fax: [phone]    Markel Lopez)  Otolaryngology  [address] [place] [address]  Phone: [phone]  Fax: [phone]    Isabel Chiang  CARDIOLOGY  [address] [address]  Phone: [phone]  Fax: [phone]    Hao Caba  [facility]  OTOLARYNG [address] [facility]  Scheduled Appointment: 04/18/2023    Vaughn Mitchell  [facility] Physician [facility]  CARDIOLOGY [facility] [place]   Scheduled Appointment: 05/16/2023

## 2023-03-28 NOTE — DISCHARGE NOTE PROVIDER - DETAILS OF MALNUTRITION DIAGNOSIS/DIAGNOSES
This patient has been assessed with a concern for Malnutrition and was treated during this hospitalization for the following Nutrition diagnosis/diagnoses:     -  03/24/2023: Severe protein-calorie malnutrition

## 2023-03-29 NOTE — PROGRESS NOTE ADULT - ASSESSMENT
67 yo M with PMH of Hypertension, Type 2 Diabetes Mellitus, Hyperlipidemia Anemia, tracheal stenosis s/p tracheostomy, glioblastoma who presented to [facility] 3/7 for cerebral angiogram with Avastin treatment. Course complicated by metabolic acidosis, acute hypoxic respiratory failure requiring mechanical ventilation, takotsubo cardiomyopathy, A-fib, chronic DVT, dysphagia. Weaned off vent. Now admitted to [facility] for rehab.    # Glioblastoma   - PT/OT/ST per PMR  - Pain management per PMR  - Keppra 750mg BID    # Stress induced cardiomyopathy (Takotsubo Cardiomyopathy)  - EF improved per echo at [facility] - most recently 35-40%  - euvolemic on exam - continue lasix prn based on volume statis  - GDMT - continue entrsto 24/26mg bid, Toprol 25mg daily with holding parameters     # Tracheal stenosis s/p trach  - ENT eval noted  - trach care    # Hyponatremia   - Na 134 on recent labs  - on NaCl tab 2g q6h. plan to taper down as tolerated gradually and cautiously given low EF    # Type 2 Diabetes Mellitus  - HbA1C 5.8%  - Hold home oral med - Januvia  - FS and JANIE    # DVT  - chronic   - Lovenox BID    DVT Prophylaxis:  - Lovenox

## 2023-03-29 NOTE — CHART NOTE - NSCHARTNOTEFT_GEN_A_CORE
Nutrition Follow Up Note  Hospital Course   (Per Electronic Medical Record)    Source:  Patient [X]  Medical Record [X]      Diet:   Consistent Carbohydrate Diet w/ Thin Liquids (IDDSI Level 0)  Tolerates Diet Consistency Well  No Chewing/Swallowing Difficulties  No Recent Nausea, Vomiting, Diarrhea or Constipation (as Per Patient)  Consumes % of Meals (as Per Documentation) - States Good PO Intake/Appetite (Per Patient)  on Glucerna 8oz PO Daily (Provides 220kcal-10grams of Protein)  Patient Takes Nutrition Supplement   Obtained Food Preferences from Patient     Enteral/Parenteral Nutrition: Not Applicable    Current Weight: 159.6lb on 3/27  Obtain Weights Daily  Weights Currently Stable @This Time     Pertinent Medications: MEDICATIONS  (STANDING):  atorvastatin 20 milliGRAM(s) Oral at bedtime  dextrose 5%. 1000 milliLiter(s) (100 mL/Hr) IV Continuous <Continuous>  dextrose 5%. 1000 milliLiter(s) (50 mL/Hr) IV Continuous <Continuous>  dextrose 50% Injectable 25 Gram(s) IV Push once  dextrose 50% Injectable 12.5 Gram(s) IV Push once  dextrose 50% Injectable 25 Gram(s) IV Push once  enoxaparin Injectable 70 milliGRAM(s) SubCutaneous every 12 hours  escitalopram 5 milliGRAM(s) Oral daily  fluticasone propionate 50 MICROgram(s)/spray Nasal Spray 1 Spray(s) Both Nostrils two times a day  glucagon  Injectable 1 milliGRAM(s) IntraMuscular once  levETIRAcetam 750 milliGRAM(s) Oral two times a day  melatonin 6 milliGRAM(s) Oral at bedtime  metoprolol succinate ER 25 milliGRAM(s) Oral daily  pantoprazole    Tablet 40 milliGRAM(s) Oral before breakfast  sacubitril 24 mG/valsartan 26 mG 1 Tablet(s) Oral every 12 hours  sodium chloride 2 Gram(s) Oral every 6 hours    MEDICATIONS  (PRN):  acetaminophen     Tablet .. 650 milliGRAM(s) Oral every 6 hours PRN Temp greater or equal to 38C (100.4F), Mild Pain (1 - 3)  acetylcysteine 20%  Inhalation 4 milliLiter(s) Inhalation every 6 hours PRN secretions  benzocaine/menthol Lozenge 1 Lozenge Oral daily PRN Sore Throat  calcium carbonate    500 mG (Tums) Chewable 2 Tablet(s) Chew every 4 hours PRN Heartburn  dextrose Oral Gel 15 Gram(s) Oral once PRN Blood Glucose LESS THAN 70 milliGRAM(s)/deciliter  polyethylene glycol 3350 17 Gram(s) Oral daily PRN Constipation  sodium chloride 3%  Inhalation 4 milliLiter(s) Inhalation every 6 hours PRN secretions    Pertinent Labs:  03-27 Na134 mmol/L<L> Glu 132 mg/dL<H> K+ 3.9 mmol/L Cr  0.90 mg/dL BUN 11 mg/dL 03-27 Alb 3.2 g/dL<L>    Skin: No Pressure Ulcers     Edema: None Noted (as Per Documentation)     Last Bowel Movement: on 3/28    Estimated Needs:   [X] No Change Since Previous Assessment    Previous Nutrition Diagnosis:   Severe Malnutrition     Nutrition Diagnosis is [X] Ongoing - Continues on Nutrition Supplement & Patient Takes Nutrition Supplement     New Nutrition Diagnosis: [X] Not Applicable    Interventions:   1. Recommend Continue Nutrition Plan of Care     Monitoring & Evaluation:   [X] Weights   [X] PO Intake   [X] Skin Integrity   [X] Follow Up (Per Protocol)  [X] Tolerance to Diet Prescription   [X] Other: Labs & POCT    Registered Dietitian/Nutritionist Remains Available.  Mauri De La Cruz RDN    Phone# [phone]

## 2023-03-29 NOTE — PROGRESS NOTE ADULT - ASSESSMENT
67 YO RHD male with PMH of DM II, HLD, JAGDISH, hyponatremia, tracheal stenosis s/p TRACH, and glioblastoma who presented to [facility] on 3/7/23  for cerebral angiogram with Avastin treatment. Course s/f  metabolic acidosis, desaturation requiring vent support, PNA, stress induced cardiomyopathy -Takotsubo cardiomyopathy, thrombocytopenia, chronic DVT, and dysphagia.     # GBM s/p cerebral angiogram with Avastin treatment  - Keppra 750mg BID  - continue Comprehensive Rehab Program: PT/OT/ST, 3hours daily and 5 days weekly  - NSGY and oncology follow up on dc  - Precautions: DM, SZ, respiratory/TRACH, fall, CA, VTE    # Respiratory Failure  - trach exchanged to 6 shiley cuffless 3/16 by ENT  - mucomyst, sodium inh  - PMV, Patient doing much better after valve exchange  - CXR 3/27: No acute pulmonary disease. (official read)  -  outpatient ENT follow up with Dr. Caba [phone]. Discussed with patient    # Takotsubo Cardiomyopathy  - EF 20%  - Entresto 24-26mg q 12 hrs  - Metoprolol XL 50mg daily. Held this Am, however pt with CMP and standard of care in heart failure as discussed with hospitalist. Continue on dc, cardiology f/u on dc  - chest discomfort: EKG and cardiac enzymes without sig abnl, ABIEL note read and appreciated  - (98/62 - 104/64) 3/29    # PNA  -S/p Cefepime 3/13-3/18    # HLD  - Lipitor 20mg daily    #Hyponatremia  - Sodium tabs 2gram Q 6 hrs   - 134 (3/27). BMP 3/30  - PCP f./u on dc    #DM II  - ISS and FS - DC  - A1c 5.8 on 3/8  - PCP f/u on dc    # Pain management  - Tylenol PRN    # GI ppx  - Protonix 40mg  - miralax PRN  - monitor    # FEN   - Diet: consistent carb with evening snacks    # DVT  - Chronic R IM calf DVT  - Lovenox BID    # Case discussed in IDT rounds 3/27  - lives with a family friend in a apt.  Has rollator and shower chair at home.  PTA was independent with [facility] care.  Son-in-law said pt was approved for 24 hr aide, but never used it, supervision ambulation and 1 flight of stairs without HR.  On reg/thin diet, Mild-mod cog deficits, high level skill deficits. Increase SLP 90 min, reduce PT 30  - Goals: SV during awake hours.  Will need assist with IADLs  - caregiver training  - TDD: 3/31 home  - Medications sent to [facility] 3/29    Outpatient Follow-up (Specialty/Name of physician):    Trevin Ho)  Neurosurgery  [address], [address] [place] [address]  Phone: [phone]  Fax: [phone]    Chapin Hoang)  EndocrinologyMetabDiabetes; Internal Medicine  [address] [address]  Phone: [phone]  Fax: [phone]    Ann Guerrero)  Cardiovascular Disease; Internal Medicine  [address], [address]  [place] [address]  Phone: [phone]  Fax: [phone]    Lizzy Houser)  HematologyOncology; Internal Medicine  [address] [address]  Phone: [phone]  Fax: [phone]    Markel Lopez)  Otolaryngology  [address] [place] [address]  Phone: [phone]  Fax: [phone]    Isabel Chiang  CARDIOLOGY  [address] [address]  Phone: [phone]  Fax: [phone]    Hao Caba  [facility] Physician [facility]  OTOLARYNG [address] [facility]  Scheduled Appointment: 04/18/2023    Vaughn Mitchell  [facility] Physician [facility]  CARDIOLOGY [address] [place]   Scheduled Appointment: 05/16/2023 69 YO RHD male with PMH of DM II, HLD, JAGDISH, hyponatremia, tracheal stenosis s/p TRACH, and glioblastoma who presented to [facility] on 3/7/23  for cerebral angiogram with Avastin treatment. Course s/f  metabolic acidosis, desaturation requiring vent support, PNA, stress induced cardiomyopathy -Takotsubo cardiomyopathy, thrombocytopenia, chronic DVT, and dysphagia.     # GBM s/p cerebral angiogram with Avastin treatment  - Keppra 750mg BID  - continue Comprehensive Rehab Program: PT/OT/ST, 3hours daily and 5 days weekly  - NSGY and oncology follow up on dc  - Precautions: DM, SZ, respiratory/TRACH, fall, CA, VTE    # Respiratory Failure  - trach exchanged to 6 shiley cuffless 3/16 by ENT  - mucomyst, sodium inh  - PMV, Patient doing much better after valve exchange  - CXR 3/27: No acute pulmonary disease. (official read)  -  outpatient ENT follow up with Dr. Caba [phone]. Discussed with patient    # Takotsubo Cardiomyopathy  - EF 20%  - Entresto 24-26mg q 12 hrs  - Metoprolol XL 50mg daily. Held this Am, however pt with CMP and standard of care in heart failure as discussed with hospitalist. Continue on dc, cardiology f/u on dc  - chest discomfort: EKG and cardiac enzymes without sig abnl, ABIEL note read and appreciated  - (98/62 - 104/64) 3/29    # PNA  -S/p Cefepime 3/13-3/18    # HLD  - Lipitor 20mg daily    #Hyponatremia  - Sodium tabs 2gram Q 6 hrs   - 134 (3/27). BMP 3/30  - PCP f./u on dc    #DM II  - ISS and FS - DC  - A1c 5.8 on 3/8  - PCP f/u on dc. will not send home on januvia given stable FS and HgA1C as discussed with medicine, f/u PCP    # Pain management  - Tylenol PRN    # GI ppx  - Protonix 40mg  - miralax PRN  - monitor    # FEN   - Diet: consistent carb with evening snacks    # DVT  - Chronic R IM calf DVT  - Lovenox BID    # Case discussed in IDT rounds 3/27  - lives with a family friend in a apt.  Has rollator and shower chair at home.  PTA was independent with trach care.  Son-in-law said pt was approved for 24 hr aide, but never used it, supervision ambulation and 1 flight of stairs without HR.  On reg/thin diet, Mild-mod cog deficits, high level skill deficits. Increase SLP 90 min, reduce PT 30  - Goals: SV during awake hours.  Will need assist with IADLs  - caregiver training  - TDD: 3/31 home  - Medications sent to [facility] 3/29    Outpatient Follow-up (Specialty/Name of physician):    Trevin Ho)  Neurosurgery  [address], [address] [place] [address]  Phone: [phone]  Fax: [phone]    Chapin Hoang)  EndocrinologyMetabDiabetes; Internal Medicine  [address] [address]  Phone: [phone]  Fax: [phone]    Ann Guerrero)  Cardiovascular Disease; Internal Medicine  [address], [address]  [place] [address]  Phone: [phone]  Fax: [phone]    Lizzy Houser)  HematologyOncology; Internal Medicine  [address] [address]  Phone: [phone]  Fax: [phone]    Markel Lopez)  Otolaryngology  [address] [place] [address]  Phone: [phone]  Fax: [phone]    Isabel Chiang  CARDIOLOGY  [address] [address]  Phone: [phone]  Fax: [phone]    Hao Caba  [facility]  OTOLARYNG [address] [facility]  Scheduled Appointment: 04/18/2023    Vaughn Mitchell  [facility]  CARDIOLOGY [address] [place]   Scheduled Appointment: 05/16/2023

## 2023-03-29 NOTE — PROGRESS NOTE ADULT - SUBJECTIVE AND OBJECTIVE BOX
Patient is a 68y old  Male who presents with a chief complaint of Glioblastoma (29 Mar 2023 11:35)      HPI:  This is a 69 YO right handed male with PMH of DM II, HLD, JAGDISH, hyponatremia, tracheal stenosis s/p tracheostomy, and glioblastoma who presented to [facility] on 3/7  for cerebral angiogram with Avastin treatment.    Per daughter, symptoms started in 2022 when patient had onset of confusion, dizziness, and speech difficulty while in [place] and was diagnosed with brain mass. Pt evaluated upon return to the [place] at [facility] ED with CT head revealing left frontal brain mass. Pt underwent resection of left frontal enhancing tumor with GLEOLAN placed 2022. Pathology showed gliosarcoma, WHO grade IV, IDH wild-type, EGFR non amplified, MGMT promotor methylated. Pt was discharged to rehab and underwent radiation treatment and Temodar chemotherapy (completed ). MRI  showed recurrence of brain mass.    The patient presented to [facility] on 3/7 referred by Dr. Mackenzie for trial participation cerebral angiogram with Avastin treatment. Post op in cath lab hypertensive, +flash pulmonary edema with desaturation, given diuretics. He developed new global aphasia and right sided weakness on 3/8. Hospital course further significant for metabolic acidosis, desaturation requiring vent support, PNA, stress induced cardiomyopathy (Takotsubo cardiomyopathy, reduced but stable EF at 20%), thrombocytopenia, chronic DVT. NGT placed and removed on 3/19, PMV per s&s.    Patient was evaluated by PM&R and therapy for functional deficits, gait/ADL impairments and acute rehabilitation was recommended. Patient was medically optimized for discharge to [facility] on 3/21/23.     (21 Mar 2023 13:09)      PAST MEDICAL & SURGICAL HISTORY:  Hypertension      Glioblastoma  Grade 4 Gliosarcoma dx 2022      Type 2 diabetes mellitus      Hyperlipidemia      Iron deficiency anemia      Nephrolithiasis      Thrombocytopenia      Hyponatremia      BPH (benign prostatic hyperplasia)      S/P craniotomy      H/O tracheostomy          MEDICATIONS  (STANDING):  atorvastatin 20 milliGRAM(s) Oral at bedtime  dextrose 5%. 1000 milliLiter(s) (100 mL/Hr) IV Continuous <Continuous>  dextrose 5%. 1000 milliLiter(s) (50 mL/Hr) IV Continuous <Continuous>  dextrose 50% Injectable 25 Gram(s) IV Push once  dextrose 50% Injectable 12.5 Gram(s) IV Push once  dextrose 50% Injectable 25 Gram(s) IV Push once  enoxaparin Injectable 70 milliGRAM(s) SubCutaneous every 12 hours  escitalopram 5 milliGRAM(s) Oral daily  fluticasone propionate 50 MICROgram(s)/spray Nasal Spray 1 Spray(s) Both Nostrils two times a day  glucagon  Injectable 1 milliGRAM(s) IntraMuscular once  levETIRAcetam 750 milliGRAM(s) Oral two times a day  melatonin 6 milliGRAM(s) Oral at bedtime  metoprolol succinate ER 25 milliGRAM(s) Oral daily  pantoprazole    Tablet 40 milliGRAM(s) Oral before breakfast  sacubitril 24 mG/valsartan 26 mG 1 Tablet(s) Oral every 12 hours  sodium chloride 2 Gram(s) Oral every 6 hours    MEDICATIONS  (PRN):  acetaminophen     Tablet .. 650 milliGRAM(s) Oral every 6 hours PRN Temp greater or equal to 38C (100.4F), Mild Pain (1 - 3)  acetylcysteine 20%  Inhalation 4 milliLiter(s) Inhalation every 6 hours PRN secretions  benzocaine/menthol Lozenge 1 Lozenge Oral daily PRN Sore Throat  calcium carbonate    500 mG (Tums) Chewable 2 Tablet(s) Chew every 4 hours PRN Heartburn  dextrose Oral Gel 15 Gram(s) Oral once PRN Blood Glucose LESS THAN 70 milliGRAM(s)/deciliter  polyethylene glycol 3350 17 Gram(s) Oral daily PRN Constipation  sodium chloride 3%  Inhalation 4 milliLiter(s) Inhalation every 6 hours PRN secretions      Allergies    amoxicillin (Rash)    Intolerances          VITALS  68y  Vital Signs Last 24 Hrs  T(C): 36.7 (29 Mar 2023 07:42), Max: 36.7 (28 Mar 2023 19:39)  T(F): 98.1 (29 Mar 2023 07:42), Max: 98.1 (29 Mar 2023 07:42)  HR: 82 (29 Mar 2023 08:50) (71 - 83)  BP: 104/64 (29 Mar 2023 07:42) (98/62 - 104/64)  BP(mean): --  RR: 16 (29 Mar 2023 07:42) (16 - 16)  SpO2: 94% (29 Mar 2023 08:50) (94% - 100%)    Parameters below as of 29 Mar 2023 08:50  Patient On (Oxygen Delivery Method): room air      Daily     Daily Weight in k.4 (28 Mar 2023 22:28)        RECENT LABS:                      CAPILLARY BLOOD GLUCOSE

## 2023-03-29 NOTE — PROGRESS NOTE ADULT - SUBJECTIVE AND OBJECTIVE BOX
Patient is a 68y old  Male who presents with a chief complaint of Glioblastoma (28 Mar 2023 21:58)    Patient seen and examined at bedside. No acute overnight events.     ALLERGIES:  amoxicillin (Rash)    MEDICATIONS  (STANDING):  atorvastatin 20 milliGRAM(s) Oral at bedtime  dextrose 5%. 1000 milliLiter(s) (50 mL/Hr) IV Continuous <Continuous>  dextrose 5%. 1000 milliLiter(s) (100 mL/Hr) IV Continuous <Continuous>  dextrose 50% Injectable 25 Gram(s) IV Push once  dextrose 50% Injectable 12.5 Gram(s) IV Push once  dextrose 50% Injectable 25 Gram(s) IV Push once  enoxaparin Injectable 70 milliGRAM(s) SubCutaneous every 12 hours  escitalopram 5 milliGRAM(s) Oral daily  fluticasone propionate 50 MICROgram(s)/spray Nasal Spray 1 Spray(s) Both Nostrils two times a day  glucagon  Injectable 1 milliGRAM(s) IntraMuscular once  levETIRAcetam 750 milliGRAM(s) Oral two times a day  melatonin 6 milliGRAM(s) Oral at bedtime  metoprolol succinate ER 25 milliGRAM(s) Oral daily  pantoprazole    Tablet 40 milliGRAM(s) Oral before breakfast  sacubitril 24 mG/valsartan 26 mG 1 Tablet(s) Oral every 12 hours  sodium chloride 2 Gram(s) Oral every 6 hours    MEDICATIONS  (PRN):  acetaminophen     Tablet .. 650 milliGRAM(s) Oral every 6 hours PRN Temp greater or equal to 38C (100.4F), Mild Pain (1 - 3)  acetylcysteine 20%  Inhalation 4 milliLiter(s) Inhalation every 6 hours PRN secretions  benzocaine/menthol Lozenge 1 Lozenge Oral daily PRN Sore Throat  calcium carbonate    500 mG (Tums) Chewable 2 Tablet(s) Chew every 4 hours PRN Heartburn  dextrose Oral Gel 15 Gram(s) Oral once PRN Blood Glucose LESS THAN 70 milliGRAM(s)/deciliter  polyethylene glycol 3350 17 Gram(s) Oral daily PRN Constipation  sodium chloride 3%  Inhalation 4 milliLiter(s) Inhalation every 6 hours PRN secretions    Vital Signs Last 24 Hrs  T(F): 98.1 (29 Mar 2023 07:42), Max: 98.1 (29 Mar 2023 07:42)  HR: 82 (29 Mar 2023 08:50) (71 - 83)  BP: 104/64 (29 Mar 2023 07:42) (98/62 - 104/64)  RR: 16 (29 Mar 2023 07:42) (16 - 16)  SpO2: 94% (29 Mar 2023 08:50) (94% - 100%)  I&O's Summary    28 Mar 2023 07:01  -  29 Mar 2023 07:00  --------------------------------------------------------  IN: 0 mL / OUT: 700 mL / NET: -700 mL    BMI (kg/m2): 24.6 (03-25-23 @ 12:45)    PHYSICAL EXAM:  General: NAD, A/O x 3  ENT: MMM, no tonsilar exudate  Neck: +trach, no secretions   Lungs: Clear to auscultation bilaterally, no wheezes. Good air entry bilaterally   Cardio: RRR, S1/S2, No murmurs  Abdomen: Soft, Nontender, Nondistended; Bowel sounds present  Extremities: No calf tenderness, No pitting edema    LABS:                        10.9   5.65  )-----------( 113      ( 27 Mar 2023 06:10 )             33.1       03-27    134  |  97  |  11  ----------------------------<  132  3.9   |  30  |  0.90    Ca    8.7      27 Mar 2023 06:10    TPro  7.0  /  Alb  3.2  /  TBili  0.5  /  DBili  x   /  AST  15  /  ALT  28  /  AlkPhos  73  03-27     CARDIAC MARKERS ( 27 Mar 2023 06:10 )  x     / 10.5 ng/L / 30 U/L / x     / 0.5 ng/mL    COVID-19 PCR: NotDetec (03-21-23 @ 19:45)  COVID-19 PCR: NotDetec (03-18-23 @ 05:38)    RADIOLOGY & ADDITIONAL TESTS:     Care Discussed with Consultants/Other Providers:

## 2023-03-30 NOTE — PROGRESS NOTE ADULT - NS ATTEND AMEND GEN_ALL_CORE FT
Progress note amended to include my discussions with the patient, NP, hospitalist, RN, SW, PT, and my findings.     Patient seen with assistance Panamanian language line  cuauhtemoc #[id number]. Patient remains comfortable with PMV, no significant throat irritation or cough during entire exam,. Breathing comfortably, sats stable on RA. Patient has had persistent soft BP in 90s, but asymptomatic during therapies, no h/a, dizziness, CP, palpitations. Reviewed medications with nursing and hospitalist; has not been receiving metoprolol due to parameters even on reduced dosage. Will dc and recommend close cardiology f/u with repeat ECHO on dc; this was discussed with patient, med recs and dcsummary  revised to reflect change. Medical follow up, including ENT also reviewed, as well as PMV wear    Lungs clear, good effort no R/R/w, comfortable. Cor Rate controled. BUE and LE normal tone and ROM. Patient is dc with referral to outpatient Pt Ot SLP per family and patient preference. Labs reviewed, stable. Meds sent, on target for dc home tomororw, caregiver training was performed. Progress note amended to include my discussions with the patient, NP, hospitalist, RN, SW, PT, and my findings.     Patient seen with assistance Thai language line  cuauhtemoc #[id number]. Patient remains comfortable with PMV, no significant throat irritation or cough during entire exam,. Breathing comfortably, sats stable on RA. Patient has had persistent soft BP in 90s, but asymptomatic during therapies, no h/a, dizziness, CP, palpitations. Reviewed medications with nursing and hospitalist; has not been receiving metoprolol due to parameters even on reduced dosage. Will dc and recommend close cardiology f/u with repeat ECHO on dc; this was discussed with patient, med recs and dcsummary  revised to reflect change. Medical follow up, including ENT also reviewed, as well as PMV wear    Lungs clear, good effort no R/R/w, comfortable. Cor Rate controled. BUE and LE normal tone and ROM. Patient is dc with referral to outpatient Pt Ot SLP per family and patient preference. Labs reviewed, stable. Meds sent, on target for dc home tomororw, caregiver training was performed.    addendum: case discussed with son in great detail. Medications reviewed: was on zofran 4 mg daily previously for chemo, and was taking daily Progress note amended to include my discussions with the patient, NP, hospitalist, RN, SW, PT, and my findings.     Patient seen with assistance Czech language line  cuauhtemoc #[id number]. Patient remains comfortable with PMV, no significant throat irritation or cough during entire exam,. Breathing comfortably, sats stable on RA. Patient has had persistent soft BP in 90s, but asymptomatic during therapies, no h/a, dizziness, CP, palpitations. Reviewed medications with nursing and hospitalist; has not been receiving metoprolol due to parameters even on reduced dosage. Will dc and recommend close cardiology f/u with repeat ECHO on dc; this was discussed with patient, med recs and dcsummary  revised to reflect change. Medical follow up, including ENT also reviewed, as well as PMV wear    Lungs clear, good effort no R/R/w, comfortable. Cor Rate controled. BUE and LE normal tone and ROM. Patient is dc with referral to outpatient Pt Ot SLP per family and patient preference. Labs reviewed, stable. Meds sent, on target for dc home tomororw, caregiver training was performed.    addendum: case discussed with son in great detail son: [phone]. Medical status, including soft BP (had been in 90s at home per son), post and intraop complications discussed as well as current cardiac mediations and recommendation to hold metoprolol. diabetic medications also discussed, as well as hospitalist recommendations;' family states that diet at home was more carb loaded given culture, and that FS usually in 140s, and although he was well controlled on metformin 500 bid prior, [facility] recommended januvia due to potential arrhythmia issues with metformin. I gave the son my cell, asked to let me know if FS were elevated at home, and would send Rx for januvia 100 daily if not controlled    - Medications reviewed: was on zofran 4 mg daily previously for chemo/persistent dyspepsia and nause,, and was taking daily per jameel, restarted for dc. Currently on lovenox bid for recently dx RLe DVt, Discussed with hospitalist, recommend starting eliquis 10mg BID loading and then in 14 days go down to regular 5mg BID    time spent for evaluation, education, coordination medical management 65 min

## 2023-03-30 NOTE — PROGRESS NOTE ADULT - SUBJECTIVE AND OBJECTIVE BOX
Patient is a 68y old  Male who presents with a chief complaint of Glioblastoma (29 Mar 2023 11:35)    HPI:  This is a 67 YO right handed male with PMH of DM II, HLD, JAGDISH, hyponatremia, tracheal stenosis s/p tracheostomy, and glioblastoma who presented to [facility] on 3/7  for cerebral angiogram with Avastin treatment.    Per daughter, symptoms started in 2022 when patient had onset of confusion, dizziness, and speech difficulty while in [place] and was diagnosed with brain mass. Pt evaluated upon return to the [place] at [facility] ED with CT head revealing left frontal brain mass. Pt underwent resection of left frontal enhancing tumor with GLEOLAN placed 2022. Pathology showed gliosarcoma, WHO grade IV, IDH wild-type, EGFR non amplified, MGMT promotor methylated. Pt was discharged to rehab and underwent radiation treatment and Temodar chemotherapy (completed ). MRI  showed recurrence of brain mass.    The patient presented to [facility] on 3/7 referred by Dr. Mackenzie for trial participation cerebral angiogram with Avastin treatment. Post op in cath lab hypertensive, +flash pulmonary edema with desaturation, given diuretics. He developed new global aphasia and right sided weakness on 3/8. Hospital course further significant for metabolic acidosis, desaturation requiring vent support, PNA, stress induced cardiomyopathy (Takotsubo cardiomyopathy, reduced but stable EF at 20%), thrombocytopenia, chronic DVT. NGT placed and removed on 3/19, PMV per s&s.    Patient was evaluated by PM&R and therapy for functional deficits, gait/ADL impairments and acute rehabilitation was recommended. Patient was medically optimized for discharge to [facility] IRU on 3/21/23.    PAST MEDICAL & SURGICAL HISTORY:  Hypertension  Glioblastoma - Grade 4 Gliosarcoma dx 2022  Type 2 diabetes mellitus  Hyperlipidemia  Iron deficiency anemia  Nephrolithiasis  Thrombocytopenia  Hyponatremia  BPH (benign prostatic hyperplasia)  S/P craniotomy  H/O tracheostomy    MEDICATIONS  (STANDING):  atorvastatin 20 milliGRAM(s) Oral at bedtime  dextrose 5%. 1000 milliLiter(s) (100 mL/Hr) IV Continuous <Continuous>  dextrose 5%. 1000 milliLiter(s) (50 mL/Hr) IV Continuous <Continuous>  dextrose 50% Injectable 25 Gram(s) IV Push once  dextrose 50% Injectable 12.5 Gram(s) IV Push once  dextrose 50% Injectable 25 Gram(s) IV Push once  enoxaparin Injectable 70 milliGRAM(s) SubCutaneous every 12 hours  escitalopram 5 milliGRAM(s) Oral daily  fluticasone propionate 50 MICROgram(s)/spray Nasal Spray 1 Spray(s) Both Nostrils two times a day  glucagon  Injectable 1 milliGRAM(s) IntraMuscular once  levETIRAcetam 750 milliGRAM(s) Oral two times a day  melatonin 6 milliGRAM(s) Oral at bedtime  pantoprazole    Tablet 40 milliGRAM(s) Oral before breakfast  sacubitril 24 mG/valsartan 26 mG 1 Tablet(s) Oral every 12 hours  sodium chloride 2 Gram(s) Oral every 6 hours    MEDICATIONS  (PRN):  acetaminophen     Tablet .. 650 milliGRAM(s) Oral every 6 hours PRN Temp greater or equal to 38C (100.4F), Mild Pain (1 - 3)  acetylcysteine 20%  Inhalation 4 milliLiter(s) Inhalation every 6 hours PRN secretions  benzocaine/menthol Lozenge 1 Lozenge Oral daily PRN Sore Throat  calcium carbonate    500 mG (Tums) Chewable 2 Tablet(s) Chew every 4 hours PRN Heartburn  dextrose Oral Gel 15 Gram(s) Oral once PRN Blood Glucose LESS THAN 70 milliGRAM(s)/deciliter  polyethylene glycol 3350 17 Gram(s) Oral daily PRN Constipation  sodium chloride 3%  Inhalation 4 milliLiter(s) Inhalation every 6 hours PRN secretions    Allergies  amoxicillin (Rash)    VITALS  68y  Vital Signs Last 24 Hrs  T(C): 36.6 (23 @ 07:54), Max: 36.7 (23 @ 20:30)  T(F): 97.8 (23 @ 07:54), Max: 98 (23 @ 20:30)  HR: 85 (23 @ 09:07) (71 - 85)  BP: 93/61 (23 @ 08:20) (93/61 - 104/64)  RR: 16 (23 @ 07:54) (16 - 16)  SpO2: 99% (23 @ 09:07) (96% - 99%)    Parameters below as of 29 Mar 2023 08:50  Patient On (Oxygen Delivery Method): room air - PMV    Daily     Daily Weight in k.4 (28 Mar 2023 22:28)      RECENT LABS:  LAB                        10.1   3.62  )-----------( 105      ( 30 Mar 2023 06:01 )             30.9     03-30    135  |  101  |  11  ----------------------------<  117<H>  4.1   |  28  |  1.06    Ca    8.5      30 Mar 2023 06:01    TPro  6.6  /  Alb  2.9<L>  /  TBili  0.3  /  DBili  x   /  AST  14  /  ALT  22  /  AlkPhos  65  03-30    LIVER FUNCTIONS - ( 30 Mar 2023 06:01 )  Alb: 2.9 g/dL / Pro: 6.6 g/dL / ALK PHOS: 65 U/L / ALT: 22 U/L / AST: 14 U/L / GGT: x           Review of Systems:   Patient seen with Luxembourgish language line  [id number]. Reports improvement of cough with new PMV.  OVerall feeling well, no CP, SOB, headache, nausea, abd pain, or dysuria.  BP noted to be soft (asymptomatic) and have not gotten metoprolol during rehab stay ->  educated to stop medication upon discharge and to follow up with card.     Physical Exam:   · Constitutional: Alert, pleasant no respiratory distress, afebrile. O x 3  · ENMT:	TRACH with Purple PMV in place, comfortable +phonating, no cough  · Respiratory	clear to auscultation bilaterally; no wheezes; no rales; no rhonchi  · Respiratory :	good effort  · Cardiovascular	regular rate and rhythm; S1 S2 present; no gallops; no rub; no murmur  · Gastrointestinal: soft; nontender; nondistended; normal active bowel sounds  · Motor: motor 5/5 BUE and LE  normal tone  - Extremities: no cyanosis, edema, or calf tenderness   Patient is a 68y old  Male who presents with a chief complaint of Glioblastoma (29 Mar 2023 11:35)    HPI:  This is a 67 YO right handed male with PMH of DM II, HLD, JAGDISH, hyponatremia, tracheal stenosis s/p tracheostomy, and glioblastoma who presented to [facility] on 3/7  for cerebral angiogram with Avastin treatment.    Per daughter, symptoms started in 2022 when patient had onset of confusion, dizziness, and speech difficulty while in [place] and was diagnosed with brain mass. Pt evaluated upon return to the [place] at [facility] ED with CT head revealing left frontal brain mass. Pt underwent resection of left frontal enhancing tumor with GLEOLAN placed 2022. Pathology showed gliosarcoma, WHO grade IV, IDH wild-type, EGFR non amplified, MGMT promotor methylated. Pt was discharged to rehab and underwent radiation treatment and Temodar chemotherapy (completed ). MRI  showed recurrence of brain mass.    The patient presented to [facility] on 3/7 referred by Dr. Mackenzie for trial participation cerebral angiogram with Avastin treatment. Post op in cath lab hypertensive, +flash pulmonary edema with desaturation, given diuretics. He developed new global aphasia and right sided weakness on 3/8. Hospital course further significant for metabolic acidosis, desaturation requiring vent support, PNA, stress induced cardiomyopathy (Takotsubo cardiomyopathy, reduced but stable EF at 20%), thrombocytopenia, chronic DVT. NGT placed and removed on 3/19, PMV per s&s.    Patient was evaluated by PM&R and therapy for functional deficits, gait/ADL impairments and acute rehabilitation was recommended. Patient was medically optimized for discharge to [facility] IRU on 3/21/23.    PAST MEDICAL & SURGICAL HISTORY:  Hypertension  Glioblastoma - Grade 4 Gliosarcoma dx 2022  Type 2 diabetes mellitus  Hyperlipidemia  Iron deficiency anemia  Nephrolithiasis  Thrombocytopenia  Hyponatremia  BPH (benign prostatic hyperplasia)  S/P craniotomy  H/O tracheostomy    MEDICATIONS  (STANDING):  atorvastatin 20 milliGRAM(s) Oral at bedtime  dextrose 5%. 1000 milliLiter(s) (100 mL/Hr) IV Continuous <Continuous>  dextrose 5%. 1000 milliLiter(s) (50 mL/Hr) IV Continuous <Continuous>  dextrose 50% Injectable 25 Gram(s) IV Push once  dextrose 50% Injectable 12.5 Gram(s) IV Push once  dextrose 50% Injectable 25 Gram(s) IV Push once  enoxaparin Injectable 70 milliGRAM(s) SubCutaneous every 12 hours  escitalopram 5 milliGRAM(s) Oral daily  fluticasone propionate 50 MICROgram(s)/spray Nasal Spray 1 Spray(s) Both Nostrils two times a day  glucagon  Injectable 1 milliGRAM(s) IntraMuscular once  levETIRAcetam 750 milliGRAM(s) Oral two times a day  melatonin 6 milliGRAM(s) Oral at bedtime  pantoprazole    Tablet 40 milliGRAM(s) Oral before breakfast  sacubitril 24 mG/valsartan 26 mG 1 Tablet(s) Oral every 12 hours  sodium chloride 2 Gram(s) Oral every 6 hours    MEDICATIONS  (PRN):  acetaminophen     Tablet .. 650 milliGRAM(s) Oral every 6 hours PRN Temp greater or equal to 38C (100.4F), Mild Pain (1 - 3)  acetylcysteine 20%  Inhalation 4 milliLiter(s) Inhalation every 6 hours PRN secretions  benzocaine/menthol Lozenge 1 Lozenge Oral daily PRN Sore Throat  calcium carbonate    500 mG (Tums) Chewable 2 Tablet(s) Chew every 4 hours PRN Heartburn  dextrose Oral Gel 15 Gram(s) Oral once PRN Blood Glucose LESS THAN 70 milliGRAM(s)/deciliter  polyethylene glycol 3350 17 Gram(s) Oral daily PRN Constipation  sodium chloride 3%  Inhalation 4 milliLiter(s) Inhalation every 6 hours PRN secretions    Allergies  amoxicillin (Rash)    VITALS  68y  Vital Signs Last 24 Hrs  T(C): 36.6 (23 @ 07:54), Max: 36.7 (23 @ 20:30)  T(F): 97.8 (23 @ 07:54), Max: 98 (23 @ 20:30)  HR: 85 (23 @ 09:07) (71 - 85)  BP: 93/61 (23 @ 08:20) (93/61 - 104/64)  RR: 16 (23 @ 07:54) (16 - 16)  SpO2: 99% (23 @ 09:07) (96% - 99%)    Parameters below as of 29 Mar 2023 08:50  Patient On (Oxygen Delivery Method): room air - PMV    Daily     Daily Weight in k.4 (28 Mar 2023 22:28)      RECENT LABS:  LAB                        10.1   3.62  )-----------( 105      ( 30 Mar 2023 06:01 )             30.9     03-30    135  |  101  |  11  ----------------------------<  117<H>  4.1   |  28  |  1.06    Ca    8.5      30 Mar 2023 06:01    TPro  6.6  /  Alb  2.9<L>  /  TBili  0.3  /  DBili  x   /  AST  14  /  ALT  22  /  AlkPhos  65  03-30    LIVER FUNCTIONS - ( 30 Mar 2023 06:01 )  Alb: 2.9 g/dL / Pro: 6.6 g/dL / ALK PHOS: 65 U/L / ALT: 22 U/L / AST: 14 U/L / GGT: x           Review of Systems:   Patient seen with Turkmen language line  [id number]. Osiel    Reports improvement of cough with new PMV.  OVerall feeling well, no CP, SOB, headache, nausea, abd pain, or dysuria.  BP noted to be soft (asymptomatic) and have not gotten metoprolol during rehab stay ->  educated to stop medication upon discharge and to follow up with cardiology. Patient verbalized understanding    Physical Exam:   · Constitutional: Alert, pleasant no respiratory distress, afebrile. O x 3  · ENMT:	TRACH with Purple PMV in place, comfortable +phonating, no cough  · Respiratory	clear to auscultation bilaterally; no wheezes; no rales; no rhonchi  · Respiratory :	good effort  · Cardiovascular	regular rate and rhythm; S1 S2 present; no gallops; no rub; no murmur  · Gastrointestinal: soft; nontender; nondistended; normal active bowel sounds  · Motor: motor 5/5 BUE and LE  normal tone  - Extremities: no cyanosis, edema, or calf tenderness

## 2023-03-30 NOTE — DISCHARGE NOTE NURSING/CASE MANAGEMENT/SOCIAL WORK - NSDCPEFALRISK_GEN_ALL_CORE
For information on Fall & Injury Prevention, visit: [email or web address] OR  [email or web address] OR  https://www.cdc.gov/steadi/patient.html

## 2023-03-30 NOTE — PROGRESS NOTE ADULT - ASSESSMENT
69 YO RHD male with PMH of DM II, HLD, JAGDISH, hyponatremia, tracheal stenosis s/p TRACH, and glioblastoma who presented to [facility] on 3/7/23  for cerebral angiogram with Avastin treatment. Course s/f  metabolic acidosis, desaturation requiring vent support, PNA, stress induced cardiomyopathy -Takotsubo cardiomyopathy, thrombocytopenia, chronic DVT, and dysphagia.     # GBM s/p cerebral angiogram with Avastin treatment  - Keppra 750mg BID  - continue Comprehensive Rehab Program: PT/OT/ST, 3hours daily and 5 days weekly  - NSGY and oncology follow up on dc  - Precautions: DM, SZ, respiratory/TRACH, fall, CA, VTE    # Respiratory Failure  - trach exchanged to 6 shiley cuffless 3/16 by ENT  - mucomyst, sodium inh  - PMV, Patient doing much better after valve exchange  - CXR 3/27: No acute pulmonary disease. (official read)  -  outpatient ENT follow up with Dr. Caba [phone]. Discussed with patient    # Takotsubo Cardiomyopathy  - EF 20%  - Entresto 24-26mg q 12 hrs  - Metoprolol XL 50mg daily -> reduced 25mg-> dc (3/30) - have not met parameter to get medication, will dc, follow up with card outpatient  - chest discomfort: EKG and cardiac enzymes without sig abnl, ABIEL note read and appreciated  - (100/62 - 102/66) 3/30    # PNA  -S/p Cefepime 3/13-3/18    # HLD  - Lipitor 20mg daily    #Hyponatremia  - Sodium tabs 2gram Q 6 hrs   - 134 (3/27) -> 135 (3/30)  - PCP f./u on dc    #DM II  - ISS and FS - DC  - A1c 5.8 on 3/8  - PCP f/u on dc. will not send home on januvia given stable FS and HgA1C as discussed with medicine, f/u PCP    # Pain management  - Tylenol PRN    # GI ppx  - Protonix 40mg  - miralax PRN  - monitor    # FEN   - Diet: consistent carb with evening snacks    # DVT  - Chronic R IM calf DVT  - Lovenox BID    # Case discussed in IDT rounds 3/27  - lives with a family friend in a apt.  Has rollator and shower chair at home.  PTA was independent with [facility] care.  Son-in-law said pt was approved for 24 hr aide, but never used it, supervision ambulation and 1 flight of stairs without HR.  On reg/thin diet, Mild-mod cog deficits, high level skill deficits. Increase SLP 90 min, reduce PT 30  - Goals: SV during awake hours.  Will need assist with IADLs  - caregiver training  - TDD: 3/31 home  - Medications sent to [facility] 3/29    Outpatient Follow-up (Specialty/Name of physician):    Trevin Ho (MD)  Neurosurgery  [address], 3 [place] [address]  Phone: [phone]  Fax: [phone]    Chapin Hoang)  EndocrinologyMetabDiabetes; Internal Medicine  [address] [address]  Phone: [phone]  Fax: [phone]    Ann Guerrero)  Cardiovascular Disease; Internal Medicine  [address], [address]  [place] [address]  Phone: [phone]  Fax: [phone]    Lizzy Houser)  HematologyOncology; Internal Medicine  [address] [address]  Phone: [phone]  Fax: [phone]    Markel Lopez)  Otolaryngology  [address], [address] [place] [address]  Phone: [phone]  Fax: [phone]    Isabel Chiang  CARDIOLOGY  [address] [address]  Phone: [phone]  Fax: [phone]    Hao Caba  [facility]  OTOLARYNG [address] [facility]  Scheduled Appointment: 04/18/2023    Vaughn Mitchell  [facility]  CARDIOLOGY [address] [place]   Scheduled Appointment: 05/16/2023 67 YO RHD male with PMH of DM II, HLD, JAGDISH, hyponatremia, tracheal stenosis s/p TRACH, and glioblastoma who presented to [facility] on 3/7/23  for cerebral angiogram with Avastin treatment. Course s/f  metabolic acidosis, desaturation requiring vent support, PNA, stress induced cardiomyopathy -Takotsubo cardiomyopathy, thrombocytopenia, chronic DVT, and dysphagia.     # GBM s/p cerebral angiogram with Avastin treatment  - Keppra 750mg BID  - continue Comprehensive Rehab Program: PT/OT/ST, 3hours daily and 5 days weekly  - NSGY and oncology follow up on dc  - Precautions: DM, SZ, respiratory/TRACH, fall, CA, VTE    # Respiratory Failure  -  6 shiley cuffless   - PMV, Patient doing much better after valve exchange  - CXR 3/27: No acute pulmonary disease. (official read)  -  outpatient ENT follow up with Dr. Caba [phone]. Discussed with patient    # Takotsubo Cardiomyopathy  - EF 20%  - chest discomfort: EKG and cardiac enzymes without sig abnl, ABIEL note read and appreciated  - Entresto 24-26mg q 12 hrs  - Metoprolol XL 50mg daily -> reduced 25mg-> dc (3/30) - Has not met parameter to get medication this past week, will dc, follow up with card outpatient. Reviewed with hosptialist, team, and patient  - (100/62 - 102/66) 3/30    # PNA  - S/p Cefepime 3/13-3/18    # HLD  - Lipitor 20mg daily    #Hyponatremia  - Sodium tabs 2gram Q 6 hrs . continue on dc  - 134 (3/27) -> 135 (3/30) stable  - PCP f./u on dc    #DM II  - ISS and FS - DC  - A1c 5.8 on 3/8  - will not send home on januvia given stable FS and HgA1C as discussed with medicine  -  f/u PCP on dc    # Pain management  - Tylenol PRN    # GI ppx  - Protonix 40mg  - miralax PRN  - monitor    # FEN   - Diet: consistent carb with evening snacks    # DVT  - Chronic R IM calf DVT  - Lovenox BID    # Case discussed in IDT rounds 3/27  - lives with a family friend in a apt.  Has rollator and shower chair at home.  PTA was independent with trach care.  Son-in-law said pt was approved for 24 hr aide, but never used it, supervision ambulation and 1 flight of stairs without HR.  On reg/thin diet, Mild-mod cog deficits, high level skill deficits. Increase SLP 90 min, reduce PT 30  - Goals: SV during awake hours.  Will need assist with IADLs  - caregiver training  - TDD: 3/31 home with outpatient Pt Ot SLP referral  - Medications sent to [facility] [address] [place] [address] 3/29    Outpatient Follow-up (Specialty/Name of physician):    Trevin Ho (MD)  Neurosurgery  [address], [address] [place] [address]  Phone: [phone]  Fax: [phone]    Chapin Hoang (MD)  EndocrinologyMetabDiabetes; Internal Medicine  [address] [address]  Phone: [phone]  Fax: [phone]    Ann Guerrero)  Cardiovascular Disease; Internal Medicine  [address], [address]  [place] [address]  Phone: [phone]  Fax: [phone]    Lizzy Houser)  HematologyOncology; Internal Medicine  [address] [address]  Phone: [phone]  Fax: [phone]    Markel Lopez)  Otolaryngology  [address] [place] [address]  Phone: [phone]  Fax: [phone]    Isabel Chiang  CARDIOLOGY  [address] [address]  Phone: [phone]  Fax: [phone]    Hao Caba  [facility]  OTOLARYNG 444 [facility]  Scheduled Appointment: 04/18/2023    Vaughn Mitchell  [facility]  CARDIOLOGY [address] [place]   Scheduled Appointment: 05/16/2023 67 YO RHD male with PMH of DM II, HLD, JAGDISH, hyponatremia, tracheal stenosis s/p TRACH, and glioblastoma who presented to [facility] on 3/7/23  for cerebral angiogram with Avastin treatment. Course s/f  metabolic acidosis, desaturation requiring vent support, PNA, stress induced cardiomyopathy -Takotsubo cardiomyopathy, thrombocytopenia, chronic DVT, and dysphagia.     # GBM s/p cerebral angiogram with Avastin treatment  - Keppra 750mg BID  - continue Comprehensive Rehab Program: PT/OT/ST, 3hours daily and 5 days weekly  - NSGY and oncology follow up on dc  - Precautions: DM, SZ, respiratory/TRACH, fall, CA, VTE    # Respiratory Failure  -  6 shiley cuffless   - PMV, Patient doing much better after valve exchange  - CXR 3/27: No acute pulmonary disease. (official read)  -  outpatient ENT follow up with Dr. Caba [phone]. Discussed with patient    # Takotsubo Cardiomyopathy  - EF 20%  - chest discomfort: EKG and cardiac enzymes without sig abnl, ABIEL note read and appreciated  - Entresto 24-26mg q 12 hrs  - Metoprolol XL 50mg daily -> reduced 25mg-> dc (3/30) - Has not met parameter to get medication this past week, will dc, follow up with card outpatient. Reviewed with hosptialist, team, and patient  - (100/62 - 102/66) 3/30    # PNA  - S/p Cefepime 3/13-3/18    # HLD  - Lipitor 20mg daily    #Hyponatremia  - Sodium tabs 2gram Q 6 hrs . continue on dc  - 134 (3/27) -> 135 (3/30) stable  - PCP f./u on dc    #DM II  - ISS and FS - DC  - A1c 5.8 on 3/8  - will not send home on januvia/metformin given stable FS and HgA1C as discussed with medicine-->metformin 500 bid at home, then changed to januvia at [facility] per family request due to risk of arrhythmia. [phone]  - f/u PCP on dc    # Pain management  - Tylenol PRN    # GI ppx  - Protonix 40mg  - miralax PRN  - monitor    # FEN   - Diet: consistent carb with evening snacks    # DVT  - Chronic R IM calf DVT  - Lovenox BID    # Case discussed in IDT rounds 3/27  - lives with a family friend in a apt.  Has rollator and shower chair at home.  PTA was independent with trach care.  Son-in-law said pt was approved for 24 hr aide, but never used it, supervision ambulation and 1 flight of stairs without HR.  On reg/thin diet, Mild-mod cog deficits, high level skill deficits. Increase SLP 90 min, reduce PT 30  - Goals: SV during awake hours.  Will need assist with IADLs  - caregiver training  - TDD: 3/31 home with outpatient Pt Ot SLP referral  - Medications sent to [facility] [address] [place] [address] 3/29    Outpatient Follow-up (Specialty/Name of physician):    Trevin Ho (MD)  Neurosurgery  [address], [address] [place] [address]  Phone: [phone]  Fax: [phone]    Chapin Hoang)  EndocrinologyMetabDiabetes; Internal Medicine  [address] [address]  Phone: [phone]  Fax: [phone]    Ann Guerrero)  Cardiovascular Disease; Internal Medicine  [address], [address]  [place] [address]  Phone: [phone]  Fax: [phone]    Lizzy Houser)  HematologyOncology; Internal Medicine  [address] [address]  Phone: [phone]  Fax: [phone]    Markel Lopez)  Otolaryngology  [address], [address] [place] [address]  Phone: [phone]  Fax: [phone]    Isabel Chiang  CARDIOLOGY  [address] [address]  Phone: [phone]  Fax: [phone]    Hao Caba  [facility]  OTOLARYNG 444 [facility]  Scheduled Appointment: 04/18/2023    Vaughn Mitchell  [facility] Physician [facility]  CARDIOLOGY [address]   Scheduled Appointment: 05/16/2023 67 YO RHD male with PMH of DM II, HLD, JAGDISH, hyponatremia, tracheal stenosis s/p TRACH, and glioblastoma who presented to [facility] on 3/7/23  for cerebral angiogram with Avastin treatment. Course s/f  metabolic acidosis, desaturation requiring vent support, PNA, stress induced cardiomyopathy -Takotsubo cardiomyopathy, thrombocytopenia, chronic DVT, and dysphagia.     # GBM s/p cerebral angiogram with Avastin treatment  - Keppra 750mg BID  - continue Comprehensive Rehab Program: PT/OT/ST, 3hours daily and 5 days weekly  - NSGY and oncology follow up on dc  - Precautions: DM, SZ, respiratory/TRACH, fall, CA, VTE    # Respiratory Failure  -  6 shiley cuffless   - PMV, Patient doing much better after valve exchange  - CXR 3/27: No acute pulmonary disease. (official read)  -  outpatient ENT follow up with Dr. Caba [phone]. Discussed with patient    # Takotsubo Cardiomyopathy  - EF 20%  - chest discomfort: EKG and cardiac enzymes without sig abnl, ABIEL note read and appreciated  - Entresto 24-26mg q 12 hrs  - Metoprolol XL 50mg daily -> reduced 25mg-> dc (3/30) - Has not met parameter to get medication this past week, will dc, follow up with card outpatient. Reviewed with hosptialist, team, and patient  - (100/62 - 102/66) 3/30    # PNA  - S/p Cefepime 3/13-3/18    # HLD  - Lipitor 20mg daily    #Hyponatremia  - Sodium tabs 2gram Q 6 hrs . continue on dc  - 134 (3/27) -> 135 (3/30) stable  - PCP f./u on dc    #DM II  - ISS and FS - DC  - A1c 5.8 on 3/8  - will not send home on januvia/metformin given stable FS and HgA1C as discussed with medicine-->metformin 500 bid at home, then changed to januvia at [facility] per family request due to risk of arrhythmia.   - f/u PCP on dc    # Pain management  - Tylenol PRN    # GI ppx  - Protonix 40mg  - miralax PRN  - monitor    # FEN   - Diet: consistent carb with evening snacks    # DVT  - Chronic R IM calf DVT  - Lovenox BID    # Case discussed in IDT rounds 3/27  - lives with a family friend in a apt.  Has rollator and shower chair at home.  PTA was independent with trach care.  Son-in-law said pt was approved for 24 hr aide, but never used it, supervision ambulation and 1 flight of stairs without HR.  On reg/thin diet, Mild-mod cog deficits, high level skill deficits. Increase SLP 90 min, reduce PT 30  - Goals: SV during awake hours.  Will need assist with IADLs  - caregiver training  - TDD: 3/31 home with outpatient Pt Ot SLP referral  - Medications sent to [facility] [address] [place] [address] 3/29    son: [phone]    Outpatient Follow-up (Specialty/Name of physician):    Trevin Ho)  Neurosurgery  [address] [address]  Phone: [phone]  Fax: [phone]    Chapin Hoang)  EndocrinologyMetabDiabetes; Internal Medicine  [address] [address]  Phone: [phone]  Fax: [phone]    Ann Guerrero)  Cardiovascular Disease; Internal Medicine  [address], [address]  [place] [address]  Phone: [phone]  Fax: [phone]    Lizzy Houser)  HematologyOncology; Internal Medicine  [address] [address]  Phone: [phone]  Fax: [phone]    Markel Lopez)  Otolaryngology  [address] [place] [address]  Phone: [phone]  Fax: [phone]    Isabel Chiang  CARDIOLOGY  [address] [address]  Phone: [phone]  Fax: [phone]    Hao Caba  [facility]ician [facility]  OTOLARYNG 444 [facility]  Scheduled Appointment: 04/18/2023    Vaughn Mitchell  [facility] Physician [facility]  CARDIOLOGY [address]   Scheduled Appointment: 05/16/2023

## 2023-03-30 NOTE — PROGRESS NOTE ADULT - SUBJECTIVE AND OBJECTIVE BOX
Patient is a 68y old  Male who presents with a chief complaint of Glioblastoma (29 Mar 2023 14:42)      Patient seen and examined at bedside. No overnight events.  Hypotensive this morning    REVIEW OF SYSTEMS:  CONSTITUTIONAL: No fever or chills  CARDIOVASCULAR: No chest pain, palpitations    ALLERGIES:  amoxicillin (Rash)    MEDICATIONS  (STANDING):  atorvastatin 20 milliGRAM(s) Oral at bedtime  dextrose 5%. 1000 milliLiter(s) (100 mL/Hr) IV Continuous <Continuous>  dextrose 5%. 1000 milliLiter(s) (50 mL/Hr) IV Continuous <Continuous>  dextrose 50% Injectable 25 Gram(s) IV Push once  dextrose 50% Injectable 12.5 Gram(s) IV Push once  dextrose 50% Injectable 25 Gram(s) IV Push once  enoxaparin Injectable 70 milliGRAM(s) SubCutaneous every 12 hours  escitalopram 5 milliGRAM(s) Oral daily  fluticasone propionate 50 MICROgram(s)/spray Nasal Spray 1 Spray(s) Both Nostrils two times a day  glucagon  Injectable 1 milliGRAM(s) IntraMuscular once  levETIRAcetam 750 milliGRAM(s) Oral two times a day  melatonin 6 milliGRAM(s) Oral at bedtime  pantoprazole    Tablet 40 milliGRAM(s) Oral before breakfast  sacubitril 24 mG/valsartan 26 mG 1 Tablet(s) Oral every 12 hours  sodium chloride 2 Gram(s) Oral every 6 hours    MEDICATIONS  (PRN):  acetaminophen     Tablet .. 650 milliGRAM(s) Oral every 6 hours PRN Temp greater or equal to 38C (100.4F), Mild Pain (1 - 3)  acetylcysteine 20%  Inhalation 4 milliLiter(s) Inhalation every 6 hours PRN secretions  benzocaine/menthol Lozenge 1 Lozenge Oral daily PRN Sore Throat  calcium carbonate    500 mG (Tums) Chewable 2 Tablet(s) Chew every 4 hours PRN Heartburn  dextrose Oral Gel 15 Gram(s) Oral once PRN Blood Glucose LESS THAN 70 milliGRAM(s)/deciliter  polyethylene glycol 3350 17 Gram(s) Oral daily PRN Constipation  sodium chloride 3%  Inhalation 4 milliLiter(s) Inhalation every 6 hours PRN secretions    Vital Signs Last 24 Hrs  T(F): 97.8 (30 Mar 2023 07:54), Max: 98 (29 Mar 2023 20:30)  HR: 85 (30 Mar 2023 09:07) (71 - 85)  BP: 93/61 (30 Mar 2023 08:20) (93/61 - 104/64)  RR: 16 (30 Mar 2023 07:54) (16 - 16)  SpO2: 99% (30 Mar 2023 09:07) (96% - 99%)  I&O's Summary    29 Mar 2023 07:01  -  30 Mar 2023 07:00  --------------------------------------------------------  IN: 0 mL / OUT: 300 mL / NET: -300 mL      BMI (kg/m2): 24.6 (03-25-23 @ 12:45)    PHYSICAL EXAM:  GENERAL: NAD  HEENT:  AT/NC, anicteric, moist mucous membranes, EOMI, PERRL, no lid-lag, conjunctiva and sclera clear, trach collar in place  CHEST/LUNG:  CTA b/l, no rales, wheezes, or rhonchi,  normal respiratory effort, no intercostal retractions  HEART:  RRR, S1, S2, no murmurs; no pitting edema  ABDOMEN:  BS+, soft, nontender, nondistended  MSK/EXTREMITIES: palpable peripheral pulses, no clubbing or cyanosis  NERVOUS SYSTEM: answers questions and follows commands appropriately A&Ox3 grossly moves all extremities   PSYCH: Appropriate affect, Alert & Awake; Good judgement    LABS: Personally reviewed                        10.1   3.62  )-----------( 105      ( 30 Mar 2023 06:01 )             30.9       03-30    135  |  101  |  11  ----------------------------<  117  4.1   |  28  |  1.06    Ca    8.5      30 Mar 2023 06:01    TPro  6.6  /  Alb  2.9  /  TBili  0.3  /  DBili  x   /  AST  14  /  ALT  22  /  AlkPhos  65  03-30          COVID-19 PCR: NotDetec (03-21-23 @ 19:45)  COVID-19 PCR: NotDetec (03-18-23 @ 05:38)      RADIOLOGY & ADDITIONAL TESTS: Personally reviewed    Medical management discussed with Dr. Correa (physiatry), hold Metoprolol due to hypotension

## 2023-03-30 NOTE — DISCHARGE NOTE NURSING/CASE MANAGEMENT/SOCIAL WORK - PATIENT PORTAL LINK FT
You can access the FollowMyHealth Patient Portal offered by [facility] by registering at the following website: [email or web address]. By joining [employer]’s FollowMyHealth portal, you will also be able to view your health information using other applications (apps) compatible with our system.

## 2023-03-30 NOTE — PROGRESS NOTE ADULT - ASSESSMENT
69 yo M with PMH of Hypertension, Type 2 Diabetes Mellitus, Hyperlipidemia Anemia, tracheal stenosis s/p tracheostomy, glioblastoma who presented to [facility] 3/7 for cerebral angiogram with Avastin treatment. Course complicated by metabolic acidosis, acute hypoxic respiratory failure requiring mechanical ventilation, takotsubo cardiomyopathy, A-fib, chronic DVT, dysphagia. Weaned off vent. Now admitted to [facility] for rehab.    # Glioblastoma   - Continue comprehensive rehab program  - Pain management per PMR  - Keppra 750mg BID    # Stress induced cardiomyopathy (Takotsubo Cardiomyopathy)  - EF improved per echo at [facility] - most recently 35-40%  - euvolemic on exam - continue lasix prn based on volume statis  - GDMT - continue entrsto 24/26mg bid  - Hold Toprol 25mg daily as patient has not received it in a week  - Monitor vitals    # Tracheal stenosis s/p trach  - ENT eval noted recommendations appreciated  - trach care    # Hyponatremia   - Continue NaCl tab 2g q6h  - Plan to taper down as tolerated gradually    # Type 2 Diabetes Mellitus  - HbA1C 5.8%  - Hold home oral med - Januvia  - FS and JANIE    # DVT  - chronic   - Continue Lovenox BID    DVT Prophylaxis:  - Lovenox

## 2023-03-31 NOTE — PROGRESS NOTE ADULT - SUBJECTIVE AND OBJECTIVE BOX
Patient is a 68y old  Male who presents with a chief complaint of Glioblastoma (30 Mar 2023 12:53)      Patient seen and examined at bedside. No overnight events.    REVIEW OF SYSTEMS:  CONSTITUTIONAL: No fever or chills  HEENT:  No headache, no sore throat    ALLERGIES:  amoxicillin (Rash)    MEDICATIONS  (STANDING):  apixaban 10 milliGRAM(s) Oral every 12 hours  atorvastatin 20 milliGRAM(s) Oral at bedtime  dextrose 5%. 1000 milliLiter(s) (100 mL/Hr) IV Continuous <Continuous>  dextrose 5%. 1000 milliLiter(s) (50 mL/Hr) IV Continuous <Continuous>  dextrose 50% Injectable 25 Gram(s) IV Push once  dextrose 50% Injectable 12.5 Gram(s) IV Push once  dextrose 50% Injectable 25 Gram(s) IV Push once  escitalopram 5 milliGRAM(s) Oral daily  fluticasone propionate 50 MICROgram(s)/spray Nasal Spray 1 Spray(s) Both Nostrils two times a day  glucagon  Injectable 1 milliGRAM(s) IntraMuscular once  levETIRAcetam 750 milliGRAM(s) Oral two times a day  melatonin 6 milliGRAM(s) Oral at bedtime  pantoprazole    Tablet 40 milliGRAM(s) Oral before breakfast  sacubitril 24 mG/valsartan 26 mG 1 Tablet(s) Oral every 12 hours  sodium chloride 2 Gram(s) Oral every 6 hours    MEDICATIONS  (PRN):  acetaminophen     Tablet .. 650 milliGRAM(s) Oral every 6 hours PRN Temp greater or equal to 38C (100.4F), Mild Pain (1 - 3)  acetylcysteine 20%  Inhalation 4 milliLiter(s) Inhalation every 6 hours PRN secretions  benzocaine/menthol Lozenge 1 Lozenge Oral daily PRN Sore Throat  calcium carbonate    500 mG (Tums) Chewable 2 Tablet(s) Chew every 4 hours PRN Heartburn  dextrose Oral Gel 15 Gram(s) Oral once PRN Blood Glucose LESS THAN 70 milliGRAM(s)/deciliter  polyethylene glycol 3350 17 Gram(s) Oral daily PRN Constipation  sodium chloride 3%  Inhalation 4 milliLiter(s) Inhalation every 6 hours PRN secretions    Vital Signs Last 24 Hrs  T(F): 97.9 (31 Mar 2023 07:31), Max: 98.4 (30 Mar 2023 21:01)  HR: 70 (31 Mar 2023 08:52) (66 - 76)  BP: 96/54 (31 Mar 2023 07:31) (96/54 - 101/60)  RR: 16 (31 Mar 2023 07:31) (14 - 16)  SpO2: 97% (31 Mar 2023 08:52) (95% - 97%)  I&O's Summary    30 Mar 2023 07:01  -  31 Mar 2023 07:00  --------------------------------------------------------  IN: 0 mL / OUT: 700 mL / NET: -700 mL          PHYSICAL EXAM:  GENERAL: NAD  HEENT:  AT/NC, anicteric, moist mucous membranes, EOMI, PERRL, no lid-lag, conjunctiva and sclera clear, trach collar in place  CHEST/LUNG:  CTA b/l, no rales, wheezes, or rhonchi,  normal respiratory effort, no intercostal retractions  HEART:  RRR, S1, S2, no murmurs; no pitting edema  ABDOMEN:  BS+, soft, nontender, nondistended  MSK/EXTREMITIES: palpable peripheral pulses, no clubbing or cyanosis  NERVOUS SYSTEM: answers questions and follows commands appropriately A&Ox3 grossly moves all extremities   PSYCH: Appropriate affect, Alert & Awake; Good judgement    LABS: Personally reviewed                        10.1   3.62  )-----------( 105      ( 30 Mar 2023 06:01 )             30.9       03-30    135  |  101  |  11  ----------------------------<  117  4.1   |  28  |  1.06    Ca    8.5      30 Mar 2023 06:01    TPro  6.6  /  Alb  2.9  /  TBili  0.3  /  DBili  x   /  AST  14  /  ALT  22  /  AlkPhos  65  03-30                COVID-19 PCR: NotDetec (03-21-23 @ 19:45)  COVID-19 PCR: NotDetec (03-18-23 @ 05:38)      RADIOLOGY & ADDITIONAL TESTS: Personally reviewed    Medical management discussed with Dr. Correa (physiatry), transition from full dose Lovenox to PO AC.

## 2023-03-31 NOTE — PROGRESS NOTE ADULT - COMMENTS
Patient was seen with Ghanaian language line  Tomás #[id number] . Otis is excited to go home today. Medication changes reviewed with patient, including absence of diabetic meds on dc. Family will send FS if elevated for possible addition januvia; importance of compliance with CCD discussed to maintain good glycemic readings, patient verbalized udnerstanding and agreement and states he wants to. He is aware of cardiology follow up Tuesday. Change from injectable to oral AC also discussed
Patient seen with Burkinan language line  Lisa #[id number]. Patient states his cough is significantly better no further chest discomfort, no fever. CXR clear for any acute pulmonary process, reviewed with jian in Burkinan    he is aware of family training and plan for dc home end of week (Thursday). He is eager for dc, feels that he will be ready then. no new complaints
Patient seen with French language line  Rakesh #[id number]. Patient says his cough is significantly improved after changing to PMV speaking valve. He denies any fever, chills, SOB, no further episodes of chest disocmfort.States family training went well and he's excited to go home    he does not remember the name of his pharmacy independently, just states it's on [address] in [facility]. Daughter later states pharmacy is [facility]

## 2023-03-31 NOTE — PROGRESS NOTE ADULT - CONSTITUTIONAL COMMENTS
Alert, pleasant no respiratory distress, afebrile. O x 3
alert, pleasant. no repsiratory distress O x 3 grossly NAD afebrile
alert, pleasant NAD O x 3 Breathing comfortably without cough PMV

## 2023-03-31 NOTE — PROGRESS NOTE ADULT - SUBJECTIVE AND OBJECTIVE BOX
Patient is a 68y old  Male who presents with a chief complaint of Glioblastoma (31 Mar 2023 10:47)      HPI:  This is a 67 YO right handed male with PMH of DM II, HLD, JAGDISH, hyponatremia, tracheal stenosis s/p tracheostomy, and glioblastoma who presented to [facility] on 3/7  for cerebral angiogram with Avastin treatment.    Per daughter, symptoms started in January 2022 when patient had onset of confusion, dizziness, and speech difficulty while in [place] and was diagnosed with brain mass. Pt evaluated upon return to the [place] at [facility] ED with CT head revealing left frontal brain mass. Pt underwent resection of left frontal enhancing tumor with GLEOLAN placed 1/27/2022. Pathology showed gliosarcoma, WHO grade IV, IDH wild-type, EGFR non amplified, MGMT promotor methylated. Pt was discharged to rehab and underwent radiation treatment and Temodar chemotherapy (completed 12/22). MRI 12/22 showed recurrence of brain mass.    The patient presented to [facility] on 3/7 referred by Dr. Mackenzie for trial participation cerebral angiogram with Avastin treatment. Post op in cath lab hypertensive, +flash pulmonary edema with desaturation, given diuretics. He developed new global aphasia and right sided weakness on 3/8. Hospital course further significant for metabolic acidosis, desaturation requiring vent support, PNA, stress induced cardiomyopathy (Takotsubo cardiomyopathy, reduced but stable EF at 20%), thrombocytopenia, chronic DVT. NGT placed and removed on 3/19, PMV per s&s.    Patient was evaluated by PM&R and therapy for functional deficits, gait/ADL impairments and acute rehabilitation was recommended. Patient was medically optimized for discharge to [facility] IRU on 3/21/23.     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 1000 milliLiter(s) (100 mL/Hr) IV Continuous <Continuous>  dextrose 5%. 1000 milliLiter(s) (50 mL/Hr) IV Continuous <Continuous>  dextrose 50% Injectable 25 Gram(s) IV Push once  dextrose 50% Injectable 12.5 Gram(s) IV Push once  dextrose 50% Injectable 25 Gram(s) IV Push once  escitalopram 5 milliGRAM(s) Oral daily  fluticasone propionate 50 MICROgram(s)/spray Nasal Spray 1 Spray(s) Both Nostrils two times a day  glucagon  Injectable 1 milliGRAM(s) IntraMuscular once  levETIRAcetam 750 milliGRAM(s) Oral two times a day  melatonin 6 milliGRAM(s) Oral at bedtime  pantoprazole    Tablet 40 milliGRAM(s) Oral before breakfast  sacubitril 24 mG/valsartan 26 mG 1 Tablet(s) Oral every 12 hours  sodium chloride 2 Gram(s) Oral every 6 hours    MEDICATIONS  (PRN):  acetaminophen     Tablet .. 650 milliGRAM(s) Oral every 6 hours PRN Temp greater or equal to 38C (100.4F), Mild Pain (1 - 3)  acetylcysteine 20%  Inhalation 4 milliLiter(s) Inhalation every 6 hours PRN secretions  benzocaine/menthol Lozenge 1 Lozenge Oral daily PRN Sore Throat  calcium carbonate    500 mG (Tums) Chewable 2 Tablet(s) Chew every 4 hours PRN Heartburn  dextrose Oral Gel 15 Gram(s) Oral once PRN Blood Glucose LESS THAN 70 milliGRAM(s)/deciliter  polyethylene glycol 3350 17 Gram(s) Oral daily PRN Constipation  sodium chloride 3%  Inhalation 4 milliLiter(s) Inhalation every 6 hours PRN secretions      Allergies    amoxicillin (Rash)    Intolerances          VITALS  68y  Vital Signs Last 24 Hrs  T(C): 36.6 (31 Mar 2023 07:31), Max: 36.9 (30 Mar 2023 21:01)  T(F): 97.9 (31 Mar 2023 07:31), Max: 98.4 (30 Mar 2023 21:01)  HR: 70 (31 Mar 2023 08:52) (66 - 76)  BP: 96/54 (31 Mar 2023 07:31) (96/54 - 101/60)  BP(mean): --  RR: 16 (31 Mar 2023 07:31) (14 - 16)  SpO2: 97% (31 Mar 2023 08:52) (95% - 97%)    Parameters below as of 31 Mar 2023 08:52  Patient On (Oxygen Delivery Method): room air      Daily     Daily         RECENT LABS:                          10.1   3.62  )-----------( 105      ( 30 Mar 2023 06:01 )             30.9     03-30    135  |  101  |  11  ----------------------------<  117<H>  4.1   |  28  |  1.06    Ca    8.5      30 Mar 2023 06:01    TPro  6.6  /  Alb  2.9<L>  /  TBili  0.3  /  DBili  x   /  AST  14  /  ALT  22  /  AlkPhos  65  03-30    LIVER FUNCTIONS - ( 30 Mar 2023 06:01 )  Alb: 2.9 g/dL / Pro: 6.6 g/dL / ALK PHOS: 65 U/L / ALT: 22 U/L / AST: 14 U/L / GGT: x                   CAPILLARY BLOOD GLUCOSE

## 2023-03-31 NOTE — PROGRESS NOTE ADULT - REASON FOR ADMISSION
Glioblastoma

## 2023-03-31 NOTE — PROGRESS NOTE ADULT - ASSESSMENT
69 YO RHD male with PMH of DM II, HLD, JAGDISH, hyponatremia, tracheal stenosis s/p TRACH, and glioblastoma who presented to [facility] on 3/7/23  for cerebral angiogram with Avastin treatment. Course s/f  metabolic acidosis, desaturation requiring vent support, PNA, stress induced cardiomyopathy -Takotsubo cardiomyopathy, thrombocytopenia, chronic DVT, and dysphagia.     # GBM s/p cerebral angiogram with Avastin treatment  - Keppra 750mg BID  - for outpatient PT OT SLP (Rx in [employer])  - NSGY and oncology follow up on dc    # Respiratory Failure  -  6 shiley cuffless   - PMV, s/p valve exchange  - outpatient ENT follow up with Dr. Caba [phone]    # Takotsubo Cardiomyopathy EF 20%  - Entresto 24-26mg q 12 hrs  - Metoprolol XL 50mg daily -> reduced 25mg-> dc (3/30) - Has not met parameter to get medication this past week, will dc, follow up with card outpatient. Reviewed with hosptialist, team, and patient  - Cardiology follow up scheduled for 4/4    # PNA  - S/p Cefepime 3/13-3/18    # HLD  - Lipitor 20mg daily  - PCP /cardiology f/u on dc    #Hyponatremia  - Sodium tabs 2gram Q 6 hrs  Reviewed with patient's family  - 135 (3/30) stable  - PCP f./u on dc    #DM II  - ISS and FS - DC  - A1c 5.8 on 3/8  - will not send home on januvia/metformin given stable FS and HgA1C as discussed with medicine-->metformin 500 bid at home, then changed to januvia at [facility] per family request due to risk of arrhythmia. FS currently controlled; hospitalist not recommending oral agent on dc given Hg A1C and numbers. compliance with CCD discussed with patient, family will contact me if FS elevated for januvia  - f/u endocrine on dc    # Pain management  - Tylenol PRN    # GI ppx  - Protonix 40mg  - miralax PRN  - monitor    # FEN   - Diet: consistent carb with evening snacks    # DVT  - Chronic R IM calf DVT  - Lovenox BID    # Dc home today with caregiver SV during awake hours.  Will need assist with IADLs  - caregiver training performed  - Medications sent to [address] 3/29. Confirmation of coverage by insurance for eliquis obtained, meds to be ready by 1 PM  - Patient medically stable for dc. Education patient and family has taken place, confirmation medication coverage made. For outpatient services OT PT SLP. Time spent for evaluation, education, coordination dc 45 min    son: [phone]    Outpatient Follow-up (Specialty/Name of physician):    Trevin Ho)  Neurosurgery  [address], [address] [place] [address]  Phone: [phone]  Fax: [phone]    Chapin Hoang)  EndocrinologyMetabDiabetes; Internal Medicine  [address] [address]  Phone: [phone]  Fax: [phone]    Ann Guerrero)  Cardiovascular Disease; Internal Medicine  [address], [address]  [place] [address]  Phone: [phone]  Fax: [phone]    Lizzy Houser)  HematologyOncology; Internal Medicine  [address] [address]  Phone: [phone]  Fax: [phone]    Markel Lopez)  Otolaryngology  [address] [place] [address]  Phone: [phone]  Fax: [phone]    Isabel Chiang  CARDIOLOGY  [address] [address]  Phone: [phone]  Fax: [phone]    Hao Caba  [facility]  OTOLARYNG [address] [facility]  Scheduled Appointment: 04/18/2023    Vaughn Mitchell  [facility]  CARDIOLOGY [address]   Scheduled Appointment: 05/16/2023

## 2023-03-31 NOTE — PROGRESS NOTE ADULT - ASSESSMENT
67 yo M with PMH of Hypertension, Type 2 Diabetes Mellitus, Hyperlipidemia Anemia, tracheal stenosis s/p tracheostomy, glioblastoma who presented to [facility] 3/7 for cerebral angiogram with Avastin treatment. Course complicated by metabolic acidosis, acute hypoxic respiratory failure requiring mechanical ventilation, takotsubo cardiomyopathy, A-fib, chronic DVT, dysphagia. Weaned off vent. Now admitted to [facility] for rehab.    # Glioblastoma   - Continue comprehensive rehab program  - Continue Keppra BID  - Continue pain regimen - adjustments per primary    # Stress induced cardiomyopathy (Takotsubo Cardiomyopathy)  - EF improved per echo at [facility] - most recently 35-40%  - euvolemic on exam - continue lasix prn based on volume statis  - GDMT - continue Entresto 24/26mg bid  - Hold Toprol 25mg daily as patient has not received it in a week  - Monitor vitals    # Tracheal stenosis s/p trach  - ENT eval noted recommendations appreciated  - trach care    # Hyponatremia   - Continue NaCl tab 2g q6h  - Taper as tolerated with monitoring for Na     # Type 2 Diabetes Mellitus  - HbA1C 5.8%  - Hold home oral med - Januvia  - FS and JANIE    # DVT  - Continue Lovenox BID - transition to PO AC Eliquis 10mg BID x 7 days -> 5mg BID    Medically stable for dc planning with close outpatient follow up

## 2023-03-31 NOTE — PROGRESS NOTE ADULT - NUTRITIONAL ASSESSMENT
This patient has been assessed with a concern for Malnutrition and has been determined to have a diagnosis/diagnoses of Severe protein-calorie malnutrition.    This patient is being managed with:   Diet Consistent Carbohydrate w/Evening Snack-  Supplement Feeding Modality:  Oral  Glucerna Shake Cans or Servings Per Day:  1       Frequency:  Daily  Entered: Mar 24 2023  1:40PM  

## 2023-03-31 NOTE — PROGRESS NOTE ADULT - MOTOR
calves soft no TTP no erythema or warmth  BUE and LE normal tone  motor 5/5 bUE and LE
BUE and LE normal tone and ROM  no calf swelling or pedal edema  motor 5/5 BUe and LE
no calf swelling soft no TTP  motor 5/5 BUE and LE  normal tone

## 2023-04-04 NOTE — PHYSICAL EXAM
[No Acute Distress] : no acute distress [Well Nourished] : well nourished [Well Developed] : well developed [Well-Appearing] : well-appearing [Normal Sclera/Conjunctiva] : normal sclera/conjunctiva [Normal Outer Ear/Nose] : the outer ears and nose were normal in appearance [Normal Oropharynx] : the oropharynx was normal [No JVD] : no jugular venous distention [No Lymphadenopathy] : no lymphadenopathy [Supple] : supple [No Respiratory Distress] : no respiratory distress  [No Accessory Muscle Use] : no accessory muscle use [Clear to Auscultation] : lungs were clear to auscultation bilaterally [Normal Rate] : normal rate  [Regular Rhythm] : with a regular rhythm [Normal S1, S2] : normal S1 and S2 [No Murmur] : no murmur heard [No Carotid Bruits] : no carotid bruits [No Varicosities] : no varicosities [Pedal Pulses Present] : the pedal pulses are present [No Edema] : there was no peripheral edema [No Extremity Clubbing/Cyanosis] : no extremity clubbing/cyanosis [Soft] : abdomen soft [Non Tender] : non-tender [Non-distended] : non-distended [Normal Bowel Sounds] : normal bowel sounds [Normal Anterior Cervical Nodes] : no anterior cervical lymphadenopathy [No CVA Tenderness] : no CVA  tenderness [No Spinal Tenderness] : no spinal tenderness [No Joint Swelling] : no joint swelling [Grossly Normal Strength/Tone] : grossly normal strength/tone [No Rash] : no rash [Coordination Grossly Intact] : coordination grossly intact [No Focal Deficits] : no focal deficits [Normal Gait] : normal gait [Normal Affect] : the affect was normal [Alert and Oriented x3] : oriented to person, place, and time [de-identified] : trach in place

## 2023-04-04 NOTE — HISTORY OF PRESENT ILLNESS
[Discharge Summary] : discharge summary [Pertinent Labs] : pertinent labs [Radiology Findings] : radiology findings [Discharge Med List] : discharge medication list [Med Reconciliation] : medication reconciliation has been completed [Post-hospitalization from ___ Hospital] : Post-hospitalization from [unfilled] Hospital [Admitted on: ___] : The patient was admitted on [unfilled] [Discharged on ___] : discharged on [unfilled] [FreeTextEntry2] : JORDAN CHAKRABORTY 67 YO right handed male with PMH of DM II, HLD, JAGDISH, hyponatremia, tracheal stenosis s/p tracheostomy, and glioblastoma who presented to [facility] on 3/7 for cerebral angiogram with Avastin treatment c/b Post op HTN emergency,  pulm edema, new global aphasia, right sided weakness, metabolic acidosis, desaturation requiring vent support, sepsis secondary PNA, SVT, stress induced cardiomyopathy (Takotsubo cardiomyopathy, reduced\par but stable EF at 20%), thrombocytopenia,  DVT started on eliquis.  TTE showedLVEF ~ 15-20% with akinetic/apical wall and basal inferior wall hyperkinesis. EKG post op NSR with new septal Qs and 1mm DAVID. Cards saw pt and thought all consistent with takotsubo CM, as EF recovered to 35% by discharge and they rec ischemic evaluation and on GDMT\par Pt then admitted to acute rehab [facility] from 3/21/23- 3/31/23. During rehab, his metoprolol was seemingly stopped due to relatively low bp (was discharged on toprol 50mg daily from hospital and lasix was changed to prn \par \par Since the hospital pt feels well. No complaints. Denies chest pain, sob, dailey, dizziness, diaphoresis, palpitations, LE swelling, orthopnea, syncope, n/v, headache. He has regained most of his functional status back and is doing well per son who provided most of history \par

## 2023-04-04 NOTE — HISTORY OF PRESENT ILLNESS
[Discharge Summary] : discharge summary [Pertinent Labs] : pertinent labs [Radiology Findings] : radiology findings [Discharge Med List] : discharge medication list [Med Reconciliation] : medication reconciliation has been completed [Post-hospitalization from ___ Hospital] : Post-hospitalization from [unfilled] Hospital [Admitted on: ___] : The patient was admitted on [unfilled] [Discharged on ___] : discharged on [unfilled] [FreeTextEntry2] : JORDAN CHAKRABORTY 69 YO right handed male with PMH of DM II, HLD, JAGDISH, hyponatremia, tracheal stenosis s/p tracheostomy, and glioblastoma who presented to [facility] on 3/7 for cerebral angiogram with Avastin treatment c/b Post op HTN emergency,  pulm edema, new global aphasia, right sided weakness, metabolic acidosis, desaturation requiring vent support, sepsis secondary PNA, SVT, stress induced cardiomyopathy (Takotsubo cardiomyopathy, reduced\par but stable EF at 20%), thrombocytopenia,  DVT started on eliquis.  TTE showedLVEF ~ 15-20% with akinetic/apical wall and basal inferior wall hyperkinesis. EKG post op NSR with new septal Qs and 1mm DAVID. Cards saw pt and thought all consistent with takotsubo CM, as EF recovered to 35% by discharge and they rec ischemic evaluation and on GDMT\par Pt then admitted to acute rehab [facility] from 3/21/23- 3/31/23. During rehab, his metoprolol was seemingly stopped due to relatively low bp (was discharged on toprol 50mg daily from hospital and lasix was changed to prn \par \par Since the hospital pt feels well. No complaints. Denies chest pain, sob, dailey, dizziness, diaphoresis, palpitations, LE swelling, orthopnea, syncope, n/v, headache. He has regained most of his functional status back and is doing well per son who provided most of history \par

## 2023-04-04 NOTE — PHYSICAL EXAM
[No Acute Distress] : no acute distress [Well Nourished] : well nourished [Well Developed] : well developed [Well-Appearing] : well-appearing [Normal Sclera/Conjunctiva] : normal sclera/conjunctiva [Normal Outer Ear/Nose] : the outer ears and nose were normal in appearance [Normal Oropharynx] : the oropharynx was normal [No JVD] : no jugular venous distention [No Lymphadenopathy] : no lymphadenopathy [Supple] : supple [No Respiratory Distress] : no respiratory distress  [No Accessory Muscle Use] : no accessory muscle use [Clear to Auscultation] : lungs were clear to auscultation bilaterally [Normal Rate] : normal rate  [Regular Rhythm] : with a regular rhythm [Normal S1, S2] : normal S1 and S2 [No Murmur] : no murmur heard [No Carotid Bruits] : no carotid bruits [No Varicosities] : no varicosities [Pedal Pulses Present] : the pedal pulses are present [No Edema] : there was no peripheral edema [No Extremity Clubbing/Cyanosis] : no extremity clubbing/cyanosis [Soft] : abdomen soft [Non Tender] : non-tender [Non-distended] : non-distended [Normal Bowel Sounds] : normal bowel sounds [Normal Anterior Cervical Nodes] : no anterior cervical lymphadenopathy [No CVA Tenderness] : no CVA  tenderness [No Spinal Tenderness] : no spinal tenderness [No Joint Swelling] : no joint swelling [Grossly Normal Strength/Tone] : grossly normal strength/tone [No Rash] : no rash [Coordination Grossly Intact] : coordination grossly intact [No Focal Deficits] : no focal deficits [Normal Gait] : normal gait [Normal Affect] : the affect was normal [Alert and Oriented x3] : oriented to person, place, and time [de-identified] : trach in place

## 2023-04-10 NOTE — HISTORY OF PRESENT ILLNESS
[FreeTextEntry1] : This is a 69 YO right handed male with PMH of DM II, HLD, JAGDISH, hyponatremia, tracheal stenosis s/p tracheostomy, and glioblastoma  recent admission to the hospital-- s/p cerebral angiogram on 3/7 with Avastin treatment c/b Post op HTN emergency, pulm edema, new global aphasia, right sided weakness, metabolic acidosis, desaturation requiring vent support, sepsis secondary PNA, SVT, stress induced cardiomyopathy (Takotsubo cardiomyopathy, reduced\par but stable EF at 20%), thrombocytopenia, DVT started on eliquis. TTE showedLVEF ~ 15-20% with akinetic/apical wall and basal inferior wall hyperkinesis. EKG post op NSR with new septal Qs and 1mm DAVID. seen by cariology-- thought all consistent with takotsubo CM, as EF recovered to 35% by discharge and they rec ischemic evaluation and on GDMT\par Pt then admitted to acute rehab [facility] from 3/21/23- 3/31/23. During rehab, his metoprolol was seemingly stopped due to relatively low bp (was discharged on toprol 50mg daily from hospital and lasix was changed to prn \par \par Since the hospital pt feels well. No complaints. Denies chest pain, sob, dailey, dizziness, diaphoresis, palpitations, LE swelling, orthopnea, syncope, n/v, headache. He has regained most of his functional status back and is doing well per son who provided most of history \par reports some weaknes in the morning

## 2023-04-14 PROBLEM — I82.401 RIGHT LEG DVT: Status: ACTIVE | Noted: 2023-01-01

## 2023-04-14 NOTE — HISTORY OF PRESENT ILLNESS
[FreeTextEntry1] : This is a 68 year old male with PMH of DM II, HLD, JAGDISH, hyponatremia, tracheal stenosis s/p tracheostomy, and glioblastoma recent admission to the hospital-- s/p cerebral angiogram on 3/7 with Avastin treatment c/b Post op HTN emergency, pulm edema, new global aphasia, right sided weakness, metabolic acidosis, desaturation requiring vent support, sepsis secondary PNA, SVT, stress induced cardiomyopathy thought to be due to Takotsubo cardiomyopathy, reduced but stable EF at 20%) DVT started on eliquis. TTE showedLVEF ~ 15-20% with akinetic/apical wall and basal inferior wall hyperkinesis. EKG post op NSR with new septal Qs and 1mm DAVID. seen by cariology-- thought to be consistent with takotsubo CM, as EF recovered to 35%. pt last seen on 4.10 for follow up pt sent for CTCA found to have severe stenosis to prox LAD moderate mild LAD and severe D2. Pt was started on  ASA 81 mg daily- which was cleared by pts NeuroSx team. \par \par PT reports overall feeling well reports some tiredness denies any chest pain or SOB

## 2023-04-17 NOTE — ED PROVIDER NOTE - DOMESTIC TRAVEL HIGH RISK QUESTION
Surgical Office Location:  [facility]  [address] [place] [id number]     No

## 2023-04-18 NOTE — ASU DISCHARGE PLAN (ADULT/PEDIATRIC) - NS MD DC FALL RISK RISK
For information on Fall & Injury Prevention, visit: [email or web address] OR  [email or web address] OR  https://www.cdc.gov/steadi/patient.html

## 2023-04-18 NOTE — H&P CARDIOLOGY - HISTORY OF PRESENT ILLNESS
68 M wih PMH of DM II, HLD, JAGDISH, hyponatremia, tracheal stenosis s/p tracheostomy, and glioblastoma recent admission to the hospital-- s/p cerebral angiogram on 3/7 with Avastin treatment c/b Post op HTN emergency, pulm edema, new global aphasia, right sided weakness, metabolic acidosis, desaturation requiring vent support, sepsis secondary PNA, SVT, stress induced cardiomyopathy thought to be due to Takotsubo cardiomyopathy, reduced but stable EF at 20%) DVT started on eliquis. TTE showedLVEF ~ 15-20% with akinetic/apical wall and basal inferior wall hyperkinesis. EKG post op NSR with new septal Qs and 1mm DAVID. seen by cariology-- thought to be consistent with takotsubo CM, as EF recovered to 35%. Patient followup with Cards and had CCTA showing severe CAD and started on ASA - which was cleared by NSx. Reports overall feeling well reports some tiredness denies any chest pain or SOB. Patient is now referred by Dr. Mitchell to Dr. Montaño for [facility] today.    Cards: Dr. Vaughn Mitchell  Coronary CTA on 4/13/23: Right coronary artery dominance. Multifocal plaque resulting in significant stenoses of proximal LAD (severe), mid LAD (moderate), and D2 (severe). Calcified plaque of D3 and PDA, difficult to quantify due to small vessel caliber  Agatston calcium score: 425 - LM (69), LAD (245), LCx (3), RCA (108)  Echo on 4/17/23 - EF 55%, mild to moderate MR, aortic root is at the upper limit of normal measuring 3.7cm. The sinuses of valsalva are dilated measuring 4.9cm. Prolapse of anterior mitral leaflet and prolapse of the posterior mitral leaflet. 68 M wih PMH of DM II, HLD, JAGDISH, hyponatremia, tracheal stenosis s/p tracheostomy, and glioblastoma recent admission to the hospital-- s/p cerebral angiogram on 3/7 with Avastin treatment c/b Post op HTN emergency, pulm edema, new global aphasia, right sided weakness, metabolic acidosis, desaturation requiring vent support, sepsis secondary PNA, SVT, stress induced cardiomyopathy thought to be due to Takotsubo cardiomyopathy, reduced but stable EF at 20%) DVT started on eliquis. last dose 4/15 at 9 am, TTE showedLVEF ~ 15-20% with akinetic/apical wall and basal inferior wall hyperkinesis. EKG post op NSR with new septal Qs and 1mm DAVID. seen by cariology-- thought to be consistent with takotsubo CM, as EF recovered to 35%. Patient followup with Cards and had CCTA showing severe CAD and started on ASA - which was cleared by NSx. Reports overall feeling well reports some tiredness denies any chest pain or SOB. Patient is now referred by Dr. Mitchell to Dr. Montaño for [facility] today.    Cards: Dr. Vaughn Mitchell  Coronary CTA on 4/13/23: Right coronary artery dominance. Multifocal plaque resulting in significant stenoses of proximal LAD (severe), mid LAD (moderate), and D2 (severe). Calcified plaque of D3 and PDA, difficult to quantify due to small vessel caliber  Agatston calcium score: 425 - LM (69), LAD (245), LCx (3), RCA (108)  Echo on 4/17/23 - EF 55%, mild to moderate MR, aortic root is at the upper limit of normal measuring 3.7cm. The sinuses of valsalva are dilated measuring 4.9cm. Prolapse of anterior mitral leaflet and prolapse of the posterior mitral leaflet. 68 M wih PMH of DM II, HLD, JAGDISH, hyponatremia, tracheal stenosis s/p tracheostomy, and glioblastoma recent admission to the hospital-- s/p cerebral angiogram on 3/7 with Avastin treatment c/b Post op HTN emergency, pulm edema, new global aphasia, right sided weakness, metabolic acidosis, desaturation requiring vent support, sepsis secondary PNA, SVT, stress induced cardiomyopathy thought to be due to Takotsubo cardiomyopathy, reduced but stable EF at 20%) DVT started on eliquis. last dose 4/15 at 9 am, TTE showedLVEF ~ 15-20% with akinetic/apical wall and basal inferior wall hyperkinesis. EKG post op NSR with new septal Qs and 1mm DAVID. seen by cariology-- thought to be consistent with takotsubo CM, as EF recovered to 35%. Patient followup with Cards and had CCTA showing severe CAD and started on ASA - which was cleared by NSx. Reports overall feeling well reports some tiredness denies any chest pain or SOB. Patient is now referred by Dr. Mitchell to Dr. Montaño for [facility] today.  endo : Tera Gannon  Neuro : Isabel Mackenzie  Cards: Dr. Vaughn Mitchell  Coronary CTA on 4/13/23: Right coronary artery dominance. Multifocal plaque resulting in significant stenoses of proximal LAD (severe), mid LAD (moderate), and D2 (severe). Calcified plaque of D3 and PDA, difficult to quantify due to small vessel caliber  Agatston calcium score: 425 - LM (69), LAD (245), LCx (3), RCA (108)  Echo on 4/17/23 - EF 55%, mild to moderate MR, aortic root is at the upper limit of normal measuring 3.7cm. The sinuses of valsalva are dilated measuring 4.9cm. Prolapse of anterior mitral leaflet and prolapse of the posterior mitral leaflet.

## 2023-04-18 NOTE — ASU PATIENT PROFILE, ADULT - FALL HARM RISK - HARM RISK INTERVENTIONS

## 2023-04-18 NOTE — ASU DISCHARGE PLAN (ADULT/PEDIATRIC) - CARE PROVIDER_API CALL
Vaughn Mitchell)  Cardiology; Internal Medicine  [address]  [place] [address]  Phone: [phone]  Fax: [phone]  Follow Up Time:

## 2023-04-20 NOTE — OCCUPATIONAL THERAPY INITIAL EVALUATION ADULT - NS ASR FOLLOW COMMAND OT EVAL
20-Apr-2023 18:07 Multistep instructions with cues/75% of the time/able to follow multistep instructions/able to follow single-step instructions

## 2023-04-27 NOTE — DISCUSSION/SUMMARY
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 Completed 6 weeks of RT with Temodar and, thus far, 6 cycles of adjuvant Temodar on 12/2022\par Neurologically stable - MRI 3/20 reviewed.\par Will do a clinical follow along with an MRI up in 2 months (5/24/2023)\par In the interim, if any concerns patient knows to call our office.

## 2023-04-27 NOTE — PHYSICAL EXAM
[General Appearance - Alert] : alert [General Appearance - In No Acute Distress] : in no acute distress [General Appearance - Well Nourished] : well nourished [] : no respiratory distress [Respiration, Rhythm And Depth] : normal respiratory rhythm and effort [Exaggerated Use Of Accessory Muscles For Inspiration] : no accessory muscle use [Edema] : there was no peripheral edema [FreeTextEntry1] : He is awake and alert - oriented and fluent (Serbian)\par Trach collar in place.\par Follow commands\par EOMI, VFF\par Face symmetric and hearing is intact\par Tongue protrudes midline\par No drift noted\par FFM are equal bilaterally\par STrength full in UE and LE\par Aline and FFM are equal - FNF ok bilaterally\par Ambulating independently and steadily. \par

## 2023-04-27 NOTE — HISTORY OF PRESENT ILLNESS
[FreeTextEntry1] : 68 yo RH man with PMHx HTN, DM, glioblastoma (MGMT methylated), presents for follow-up. \par \par ONCOLOGIC HISTORY( Reviewed and copied from Dr Hill notes)\par 1/7/2022 - presented to [facility] in [place] [facility] with headaches and cognitive changes and was diagnosed with brain tumor\par 1/24/2022 - presented to [facility] ED with complaints of confused speech (only states name to all questions asked)\par 1/27/2022 - s/p resection of LEFT frontal enhancing tumor with GLEOLAN placed.\par PATH: Gliosarcoma, no ATRX mutation on immunostains.\par 2/4/2022 - COVID testing NEGATIVE\par 2/8/2022 - d/c from [facility] to [facility] rehab on salt tablets, insulin, and decadron \par 2/9/2022 - COVID POSITIVE, but thought to be FALSE positive as others negative\par 2/10/2022 - staples removed.\par 2/24/2022 - saw ENT for hoarse voice ( Dr. Graham Bergeron [phone])\par 2/28/2022 - returned to [facility] ED from rehab with complaints of difficulty swallowing and vocal cord injury. notes indicate, "Had NGtube during his stay that removed before he was discharged. Since the removal he has been experiencing throat pain and upper chest pain. Seen by ENT outpt and was noted to have vocal cord swelling and or irritation and sent in for CT. Having difficulty swallowing. 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.\par PATH: granulation, inflammation, no carcinoma.\par 3/26/22 - seen by Endocrine ([facility])\par 3/31/22 - seen by rad onc ([facility]) on lexapro 5mg daily.\par 4/4/22 - seen by surgery \par 4/18/22 - 5/30/22 - completed 6-week course of chemoRT\par 7/5/22 - tmz cycle #1, dose 250mg (150mg/m2) \par  8/6/22 - tmz cycle #2, dose 340mg (200mg/m2) \par 8/11/2022 - MRI stable \par 9/6/2022 - Reports Occasional twitching of left ue - hand - Keppra started on 6/13 - episodes reduced in frequency but still happening- daughter showed me a video from 8/19 - appeared to behaving rhythmic movements of right UE. Patient himself is unable to tell me whether he is aware of these events. Daughter reports compliance with medications. Raised Keppra to 750 mg bid\par 9/30/2022- 10/3- TMZ # 3 ( 340 MG x 4 DAYS sec to Thrombocytopenia)\par 10/19/2022 - MRI stable\par 11/25-11/29- Cycle # 5 \par 12/15/2022- MRI had shown increase enhancement (official read) at the posterior inferior margin of the left frontal operative cavity.Plan was to repeat scan at short interval as he was clinically stable. \par 12/23-12/27 He had his 6th cycle of Temodar \par 1/23/2023 - MRI reviewed and discussed at TB and plan was made to repeat MRI in February\par 2/16/2023 -  MRI with POD - Recommended clinical trial with IA AVastin. \par 3/7/2023 -  s/p cerebral angiogram with Avastin treatment c/b Post op HTN emergency, pulm edema, new global aphasia, right sided weakness, metabolic acidosis, desaturation requiring vent support, sepsis secondary PNA, SVT, stress induced cardiomyopathy thought to be due to Takotsubo cardiomyopathy, reduced but stable EF at 20%) DVT started on eliquis. TTE showed LVEF ~ 15-20% with akinetic/apical wall and basal inferior wall hyperkinesis. EKG post op NSR with new septal Qs and 1mm DAVID. seen by cariology-- thought to be consistent with takotsubo CM, as EF recovered to 35%. pt last seen on 4.10 for follow up pt sent for CTCA found to have severe stenosis to prox LAD moderate mild LAD and severe D2. Pt was started on ASA 81 mg daily- which was cleared by pts NeuroSx team.  \par 4/27/2023- Here for a follow up. Family reports EF improved to 55%. His inner canula was changed to shiley# 6 on 4/25/2023. \par

## 2023-05-16 PROBLEM — Z86.718 HISTORY OF DVT OF LOWER EXTREMITY: Status: ACTIVE | Noted: 2023-01-01

## 2023-05-16 PROBLEM — I87.2 VENOUS INSUFFICIENCY OF BOTH LOWER EXTREMITIES: Status: ACTIVE | Noted: 2023-01-01

## 2023-05-16 PROBLEM — I83.893 VARICOSE VEINS OF BILATERAL LOWER EXTREMITIES WITH OTHER COMPLICATIONS: Status: ACTIVE | Noted: 2023-01-01

## 2023-05-16 NOTE — HISTORY OF PRESENT ILLNESS
[FreeTextEntry1] : This is a 68 year old male with PMH of DM II, HLD, JAGDISH, hyponatremia, tracheal stenosis s/p tracheostomy, and glioblastoma s/p cerebral angiogram on 3/7 with Avastin treatment c/b Post op HTN emergency, pulm edema, new global aphasia, right sided weakness, metabolic acidosis, desaturation requiring vent support, sepsis secondary PNA, SVT, stress induced cardiomyopathy thought to be due to Takotsubo cardiomyopathy, reduced but stable EF at 20%) DVT started on eliquis. TTE showedLVEF ~ 15-20% with akinetic/apical wall and basal inferior wall hyperkinesis. EKG post op NSR with new septal Qs and 1mm DAVID. seen by cardiology thought to be consistent with takotsubo CM, as EF recovered to 35%. pt last seen on 4/14 CTCA found to have severe stenosis to prox LAD moderate mild LAD and severe D2. Pt was started on ASA 81 mg daily- which was cleared by pts NeuroSx team sent for cardiac cath  on found to have non obstructive CAD recommending medical therapy. pt reports chest discomfort during coughing at night. reports some dizziness. pt is on salt tabs. \par \par son here in office-- reports bleeding from trach follows with ENT\par \par

## 2023-05-16 NOTE — HISTORY OF PRESENT ILLNESS
[FreeTextEntry1] : pt w son in attendance \par pt was dx w  glioblastoma and received treatment at [facility] march 2023 \par \par while hospitalized  was dx w  rle dvt  \par rx w eliquis \par \par pt referred  by Dr VINOD Mitchell to  [facility] for dvt

## 2023-05-16 NOTE — ASSESSMENT
[FreeTextEntry1] : Impression hx of rle dvt after  chemo w ongoing glioblastoma  condition and tent future  therapy planned  \par \par \par Plan Med Conservative management \par may d/c anticoag rx\par d/w pt and son that the glioblastoma and  future rx  may create a hypercoag states and pt may be prone\par to future  dvt\par recommended to rto in  1-2 mo after new  therapy is started  to  re eval w yen le venous duplex \par based on this will make future recommendations\par \par letter faxed to Dr VINOD Mitchell MD \par \par  [Arterial/Venous Disease] : arterial/venous disease

## 2023-05-16 NOTE — PHYSICAL EXAM
[JVD] : no jugular venous distention  [1+] : left 1+ [2+] : left 2+ [Ankle Swelling (On Exam)] : present [Ankle Swelling Bilaterally] : bilaterally  [Varicose Veins Of Lower Extremities] : bilaterally [Ankle Swelling On The Right] : mild [Alert] : alert [Oriented to Person] : oriented to person [Oriented to Place] : oriented to place [Oriented to Time] : oriented to time [Calm] : calm [de-identified] : nad [de-identified] : wnl [FreeTextEntry1] : Mild  bilateral leg venous insufficiency \par w mild  bilateral leg stasis dermatitis\par and mild  bilateral leg edema \par no wounds/ulcers\par  [de-identified] : wnl [de-identified] : Elliot Cranial nerves 2-12 elliot grossly intact [de-identified] : cooperative

## 2023-05-23 PROBLEM — Z78.9 NON-SMOKER: Status: ACTIVE | Noted: 2022-03-24

## 2023-05-23 PROBLEM — E78.5 HYPERLIPIDEMIA: Status: ACTIVE | Noted: 2022-05-10

## 2023-05-25 NOTE — PHYSICAL EXAM
[FreeTextEntry1] : He is awake and alert - oriented to May - 2022 (corrected) and President.\par He can follow simple and more complex commands.\par EOMI, VFF\par Face symmetric and tongue midline\par Tone is normal.\par No drift\par FFM and Aline intact\par Strength full in UE and LE bilaterally.

## 2023-05-25 NOTE — HISTORY OF PRESENT ILLNESS
[FreeTextEntry1] : Mr. Norwood was seen in neuro oncologic follow up - he is a 67 yo man being followed for a diagnosis of Gliosarcoma, IDH wt, MGMT methylated.\par \par Briefly, he is a 67 yo man who presented in 1/2022 to [facility] ED with headache and confusion - he was found to have a left frontal mass which was resected by Dr. Gupta on 1/27/2022. He was discharged to rehab but while there had difficulty with swallowing and was found to have swelling of his vocal cords prompting a tracheostomy.\par \par He had a 3 week course of chemo/RT 4/18 - 5/30/2022.\par He started monthly TMZ in 7/2022 and completed 6 cycles in 12/2022 at which time the left frontal operative cavity appeared to have more posterior nodular enhancement - he was referred to Dr. Dorado at that time to be considered for IA Avastin at recurrence trial.\par \par On 3/7, Mr. Norwood underwent cerebral angiogram with IA Avastin treatment. Post-operatively, he had some complications including pulmonary edema, expressive aphasia, desaturation requiring ventilatory support, sepsis and stress induced cardiomyopathy. He recovered extremely well. Cardiology has been able to stop many meds started for failure - vascular stopped Eliquis as he was having bleeding from the region of his trach.\par \par His family reports, he is more alert and conversive. He is independent will all ADLs.\par \par \par 5/25 compared to 3/30 - the previously see small areas of left BG enhancement are not visible to me on today's exam. However, the enhancement in the posterior aspect of the left frontal operative cavity around the left anterior horn of the lateral ventricle may be slightly increased -official review is pending.\par

## 2023-05-25 NOTE — DISCUSSION/SUMMARY
[FreeTextEntry1] : In summary, Mr. Norwood is neurologically improved - MRI official reading is pending - my interpretation is as above - he is scheduled to see Dr. Dorado re IA Avastin - will discuss whether this is best option.

## 2023-05-26 NOTE — HISTORY OF PRESENT ILLNESS
[FreeTextEntry1] : 66 yo RH man with PMHx HTN, DM, glioblastoma (MGMT methylated), presents for follow-up. \par \par ONCOLOGIC HISTORY( Reviewed and copied from Dr Hill notes)\par 1/7/2022 - presented to [facility] with headaches and cognitive changes and was diagnosed with brain tumor\par 1/24/2022 - presented to [facility] ED with complaints of confused speech (only states name to all questions asked)\par 1/27/2022 - s/p resection of LEFT frontal enhancing tumor with GLEOLAN placed.\par PATH: Gliosarcoma, no ATRX mutation on immunostains.\par 2/4/2022 - COVID testing NEGATIVE\par 2/8/2022 - d/c from [facility] to [facility] rehab on salt tablets, insulin, and decadron \par 2/9/2022 - COVID POSITIVE, but thought to be FALSE positive as others negative\par 2/10/2022 - staples removed.\par 2/24/2022 - saw ENT for hoarse voice ( Dr. Graham Bergeron [phone])\par 2/28/2022 - returned to [facility] ED from rehab with complaints of difficulty swallowing and vocal cord injury. notes indicate, "Had NGtube during his stay that removed before he was discharged. Since the removal he has been experiencing throat pain and upper chest pain. Seen by ENT outpt and was noted to have vocal cord swelling and or irritation and sent in for CT. Having difficulty swallowing. 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.\par PATH: granulation, inflammation, no carcinoma.\par 3/26/22 - seen by Endocrine (IVY)\par 3/31/22 - seen by rad onc ([facility]) on lexapro 5mg daily.\par 4/4/22 - seen by surgery \par 4/18/22 - 5/30/22 - completed 6-week course of chemoRT\par 7/5/22 - tmz cycle #1, dose 250mg (150mg/m2) \par  8/6/22 - tmz cycle #2, dose 340mg (200mg/m2) \par 8/11/2022 - MRI stable \par 9/6/2022 - Reports Occasional twitching of left ue - hand - Keppra started on 6/13 - episodes reduced in frequency but still happening- daughter showed me a video from 8/19 - appeared to behaving rhythmic movements of right UE. Patient himself is unable to tell me whether he is aware of these events. Daughter reports compliance with medications. Raised Keppra to 750 mg bid\par 9/30/2022- 10/3- TMZ # 3 ( 340 MG x 4 DAYS sec to Thrombocytopenia)\par 10/19/2022 - MRI stable\par 11/25-11/29- Cycle # 5 \par 12/15/2022- MRI had shown increase enhancement (official read) at the posterior inferior margin of the left frontal operative cavity.Plan was to repeat scan at short interval as he was clinically stable. \par 12/23-12/27 He had his 6th cycle of Temodar \par 1/23/2023 - MRI reviewed and discussed at TB and plan was made to repeat MRI in February\par 2/16/2023 -  MRI with POD - Recommended clinical trial with IA AVastin. \par 3/7/2023 -  s/p cerebral angiogram with Avastin treatment c/b Post op HTN emergency, pulm edema, new global aphasia, right sided weakness, metabolic acidosis, desaturation requiring vent support, sepsis secondary PNA, SVT, stress induced cardiomyopathy thought to be due to Takotsubo cardiomyopathy, reduced but stable EF at 20%) DVT started on eliquis. TTE showed LVEF ~ 15-20% with akinetic/apical wall and basal inferior wall hyperkinesis. EKG post op NSR with new septal Qs and 1mm DAVID. seen by cariology-- thought to be consistent with takotsubo CM, as EF recovered to 35%. pt last seen on 4.10 for follow up pt sent for CTCA found to have severe stenosis to prox LAD moderate mild LAD and severe D2. Pt was started on ASA 81 mg daily- which was cleared by pts NeuroSx team.  \par 4/27/2023- Here for a follow up. Family reports EF improved to 55%. His inner canula was changed to shiley# 6 on 4/25/2023. \par  [de-identified] : \par \par TODAY 5/26/23: Patient presents following #1 IA Avastin infusion 3/7/23 which was complicated by hypertensive emergency and respiratory failure and to review MRI brain 5/25/23. The patient EF has now resolved in addition to his DM.

## 2023-05-26 NOTE — ASSESSMENT
[FreeTextEntry1] : The patient's established problem of recurrent GBM is stable on his 5/25/23 scan. His KPS is 90. I had a long discussion with the patient regarding the role of continued surveillance imaging. The patient was extensively educated about the nature of his disease process. Therapeutic and diagnostic tests include MRI brain w/wo in 2 months (July 2023). The patient should continue to see cardiology- Dr. Mitchell. I will see the patient back at the end of July to review most recent MRI and check progress. I have explained the alternatives, risks and benefits to the patient and she understands and agrees to proceed. This risk of the procedure including but not limited to pain, infection, seizure, stroke, recurrence, residual disease, neurovascular injury, heart attack, pulmonary embolism, blindness, weakness, paralysis, and death have been carefully explained to the patient who clearly understands and agrees to proceed.\par

## 2023-05-26 NOTE — REASON FOR VISIT
[Follow-Up: _____] : a [unfilled] follow-up visit [FreeTextEntry1] : s/p #1 IA Avastin - review MRI brain 5/25/23

## 2023-05-29 PROBLEM — K21.9 CHRONIC GERD: Status: ACTIVE | Noted: 2022-05-03

## 2023-05-29 NOTE — HISTORY OF PRESENT ILLNESS
[FreeTextEntry1] : Mr. JORDAN CHAKRABORTY is a 68 year old male who returns for endocrine evaluation with regard to a hx of type 2 dm. The diabetes has been present for about 1.5 years. Patient accompanied by daughter and son-in-law. \par \par He was diagnosed with a Gliosarcoma s/p resection of the left frontal enhancing tumor in January 2022 and Gleolan placed\par  He too had developed Tracheal stenosis and is s/p recent Tracheal balloon dilation on 3/2/22 and tracheostomy on 3/17/22- wWas on chemotherapy and radiation. As part of his chemo regimen, he was given glucocorticoids which did elevate his glucose.\par \par Recently was treated with Avastin via catheter to get close to the lesion and pass the blood-brain barrier.  He developed CHF with reduced ejection fraction after procedure. \par \par He denies any history of retinopathy or nephropathy. With regard to neuropathy,he denies any specific diabetic neuropathy type s/s.\par  \par For the diabetes, he is currently taking Januvia 100 mg. \par \par HGM has shown to be running in the in the low 100s-150s throughout the day \par A1c resulted at 5.9% today, previously 5.6% on 4/4/23 \par Patient has been off corticosteroids since February of this year.\par \par Does note about 40 lbs of weight loss. Patient has met with GI. Son in law notes that his appetite has improved as of late. Patient can only have soft foods or soup. \par \par He denies polyuria, polydipsia, or any visual changes. He too denies any skin lesions, skin breakdown or non-healing areas of skin. He too denies any podiatric concerns. Ophthalmologic evaluation is up to date. \par Additional medical history includes that of Cva about 10-15 years ago, hyponatremia, hypotension\par \par Doses have trach in place. Is able to swallow. PO intake is reduced from prior-mostly soft foods.\par Weight in 170'as pre ca dx, now 163 lbs \par \par FH DM2 Two SIblings\par \par Meds: Atorvastatin, Gabapentin, Escitalopram and Vimpat along with Melatonin 3 mg and Nacl tabs

## 2023-05-29 NOTE — PHYSICAL EXAM
[Alert] : alert [Well Nourished] : well nourished [No Acute Distress] : no acute distress [Well Developed] : well developed [Normal Sclera/Conjunctiva] : normal sclera/conjunctiva [EOMI] : extra ocular movement intact [No Proptosis] : no proptosis [Normal Oropharynx] : the oropharynx was normal [Thyroid Not Enlarged] : the thyroid was not enlarged [No Thyroid Nodules] : no palpable thyroid nodules [No Respiratory Distress] : no respiratory distress [No Accessory Muscle Use] : no accessory muscle use [Clear to Auscultation] : lungs were clear to auscultation bilaterally [Normal S1, S2] : normal S1 and S2 [Normal Rate] : heart rate was normal [Regular Rhythm] : with a regular rhythm [No Edema] : no peripheral edema [Pedal Pulses Normal] : the pedal pulses are present [Normal Bowel Sounds] : normal bowel sounds [Not Tender] : non-tender [Not Distended] : not distended [Normal Anterior Cervical Nodes] : no anterior cervical lymphadenopathy [Soft] : abdomen soft [Normal Posterior Cervical Nodes] : no posterior cervical lymphadenopathy [No Spinal Tenderness] : no spinal tenderness [No Stigmata of Cushings Syndrome] : no stigmata of Cushings Syndrome [Spine Straight] : spine straight [Normal Gait] : normal gait [Normal Strength/Tone] : muscle strength and tone were normal [No Rash] : no rash [Normal Reflexes] : deep tendon reflexes were 2+ and symmetric [No Tremors] : no tremors [Oriented x3] : oriented to person, place, and time [Acanthosis Nigricans] : no acanthosis nigricans

## 2023-05-30 PROBLEM — E87.1 HYPONATREMIA: Status: ACTIVE | Noted: 2022-05-10

## 2023-05-30 PROBLEM — I95.9 HYPOTENSION: Status: ACTIVE | Noted: 2022-06-09

## 2023-05-30 PROBLEM — I82.409 DVT (DEEP VENOUS THROMBOSIS): Status: ACTIVE | Noted: 2023-01-01

## 2023-05-30 PROBLEM — I42.9 CARDIOMYOPATHY: Status: ACTIVE | Noted: 2023-01-01

## 2023-05-30 PROBLEM — R58 BLEEDING: Status: ACTIVE | Noted: 2023-01-01

## 2023-05-30 PROBLEM — I25.10 CAD (CORONARY ARTERY DISEASE): Status: ACTIVE | Noted: 2023-01-01

## 2023-05-30 PROBLEM — E11.9 DIABETES MELLITUS, TYPE 2: Status: ACTIVE | Noted: 2022-03-09

## 2023-05-30 PROBLEM — D69.6 THROMBOCYTOPENIA: Status: ACTIVE | Noted: 2022-01-01

## 2023-05-30 PROBLEM — I47.1 PAROXYSMAL SVT (SUPRAVENTRICULAR TACHYCARDIA): Status: ACTIVE | Noted: 2023-01-01

## 2023-05-30 NOTE — HISTORY OF PRESENT ILLNESS
[FreeTextEntry1] : This is a 67 y/o male with a pmhx of  DM II, HLD, JAGDISH, hyponatremia, tracheal stenosis s/p tracheostomy, and glioblastoma s/p cerebral angiogram on 3/7 with Avastin treatment c/b Post op HTN emergency, pulm edema, new global aphasia, right sided weakness, metabolic acidosis, desaturation requiring vent support, sepsis secondary PNA, SVT, stress induced cardiomyopathy thought to be due to Takotsubo cardiomyopathy, reduced but stable EF at 20%) DVT. Here today for a follow up. He was last seen in the office on 5/16/23 s/p cardiac cath showing non obstructive CAD. Pt was noted with low BP and entresto was d/c. Patient son reported bleeding from trach tube, and was advised to d/c Eliquis as previous DVT was post chemo and was distal. Pt feels well off entresto and Eliquis, Pt son reports significant improvement in bleeding since Eliquis was stopped. As per Pt son he had recent MR brain which showed stablility.

## 2023-06-27 PROBLEM — J39.8 TRACHEAL STENOSIS: Status: ACTIVE | Noted: 2022-03-24

## 2023-06-27 PROBLEM — J39.8: Status: ACTIVE | Noted: 2022-07-26

## 2023-06-27 PROBLEM — C71.9 GLIOBLASTOMA: Status: ACTIVE | Noted: 2022-04-04

## 2023-06-27 NOTE — HISTORY OF PRESENT ILLNESS
[de-identified] : 68 year old male with history of gliosarcoma s/p resection of the left frontal enhancing tumor in January 2022 and developed Tracheal stenosis and is s/p recent Tracheal balloon dilation on 3/2/22 and tracheostomy on 3/17/22.  RT completed on 5/31/22 and completed chemo 12/30/2022. Pt is here for trach change and chronic coughing and for the past 3 days with coughing up blood up to 3-4x, voice change and discomfort at the throat. \par 3/7/2023 PT has in the [facility] for brain surgery, complicated on ventilator, ICU, change cuffed to disposable, back on cuffless now. \par PT is tolerated po well and no dysphagia or dyspnea.  \par \par

## 2023-06-27 NOTE — REASON FOR VISIT
[Subsequent Evaluation] : a subsequent evaluation for [Family Member] : family member [Source: ______] : History obtained from [unfilled] [FreeTextEntry2] : trach change

## 2023-06-27 NOTE — PHYSICAL EXAM
[Midline] : trachea located in midline position [Laryngoscopy Performed] : laryngoscopy was performed, see procedure section for findings [Normal] : no rashes [de-identified] : Size 6 cuffless Shilley in place, mild granulation.  No LAD.

## 2023-06-27 NOTE — PHYSICAL EXAM
[Midline] : trachea located in midline position [Laryngoscopy Performed] : laryngoscopy was performed, see procedure section for findings [Normal] : no rashes [FreeTextEntry1] : No stridor. [de-identified] : Tracheostoma is about 2 mm and can't be dilated to more than 3mm.  No bleeding.

## 2023-06-27 NOTE — PROCEDURE
[FreeTextEntry1] : Trach change, FOB, FFL, cauterization of granulation tissue. [FreeTextEntry2] : Confirm placement of tracheostomy, chronic coughing, stomal granulation.  [FreeTextEntry3] : After patient gave consent, a FFL was performed. No lesions in the NPx, OPx, HPx or larynx. The VC seemed paretic, R > L, with limited abduction.  However, he has complete adduction and his airway though narrowed is patent.  A FOB was then performed and we replaced the trach in the site and confirmed placement and there is some mild granulation at the stomal site which was cauterized.  No lesions in the trachea, no significant secretions. \par \par Scope: 208.\par

## 2023-06-28 NOTE — HISTORY OF PRESENT ILLNESS
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 Guevara for re-certification of medical cannabis. \par \par In January 2022 he presented to the clinic while visiting the [place] with complaints of headaches and cognitive changes. He was diagnosed with a brain tumor. \par \par He then returned the the [place] and presented to [facility] on 1/24/22 with speech impairment and confusion. Inpatient CT brain noted an extensive lesion with surrounding vasogenic edema in the left frontal and temporal lobes with involvement of the corpus callosum and medial left frontal lobe with marked ventricular effacement and a 1.4 cm left to right midline shift. \par \par Inpatient brain MRI on 1/25/22 showed evidence of a mass lesion in the left frontal lobe with significant mass effect on the left lateral ventricle and midline shift with surrounding vasogenic edema and subfalcine herniation. Suspicion of possible invasion of the corpus callosum noted. \par \par On 1/27/22 he underwent a left frontal craniotomy and resection of the left frontal enhancing tumor and Gleolan placement by Dr. Gupta. Pathology showed a gliosarcoma WHO grade 4. IDH-1 negative. \par \par He then presented to [facility] on 2/28/22 with complaints of dysphagia and vocal cord injury. On 3/2/22 he underwent tracheal stenosis, bronchial lavage and trachea biopsy which was negative for malignancy. Post-operative recovery was complicated by stridor. Tracheostomy on 3/17/22. \par \par He was assessed to have a gliosarcoma of brain, since completed tx with RT and temodar. He was originally certified for nausea, cancer cachexia, and weight loss.\par \par He completed RT to the left partial brain, total dose of 6000 cGy with Dr. Guevara from 4/18/22- 5/30/22. \par \par Completed 6 cycles of Temodar from 7/2022- 12/27/22. \par \par On 3/7/23 he underwent cerebral angiogram with IA Avastin treatment. He had complications including pulmonary edema, aphasia, sepsis, and cardiomyopathy requiring ventilatory support.\par \par MRI brain performed on 5/25/23 showed new leptomeningeal enhancement in the left superior frontal region, suspicious for tumor progression. Mild increased conspicuity of enhancement involving the medial left inferior frontal resection cavity, indeterminate. Previously described punctate foci of enhancement in the left basal ganglia have resolved, these were likely subacute infarcts, now chronic.\par \par He consented to a telehealth follow up for medical cannabis renewal. He was initially certified for medical cannabis on 5-20-22.  States medical cannabis helps relieve his nausea and abdominal pain. Also now has headaches at times. Weakness noted. Weight has been stable with medical cannabis use, although he has lost about 10 pounds in the past few months. Previously noted was a decreased appetite and poor oral intake. Weight loss of about 15 pounds originally noted. Daughter states weight loss was closer to 30 lbs.

## 2023-06-28 NOTE — REASON FOR VISIT
[Home] : at home, [unfilled] , at the time of the visit. [Verbal consent obtained from patient] : the patient, [unfilled] [Consideration for Non-Curative Therapy] : consideration for non-curative therapy for [Brain Tumor] : brain tumor [Other: ___] : [unfilled] [Other: _____] : [unfilled] [Medical Office: ([facility])___] : at the medical office located in

## 2023-06-28 NOTE — DISEASE MANAGEMENT
[Clinical] : TNM Stage: c [N/A] : Currently not applicable [FreeTextEntry4] : GBM [TTNM] : - [NTNM] : - [MTNM] : -

## 2023-07-07 NOTE — H&P ADULT - ASSESSMENT
Osiel Norwood is a 67YO male with a past medical history of type 2 diabetes, hyperlipidemia, iron deficiency anemia, tracheal stenosis s/p tracheostomy, glioblastoma s/p resection 1/27/2022, and Avastin treatment 3/2023 presenting with altered mental status and weakness x2 days. Last seen at baseline was around 6pm. Patient has expressive aphasia that is getting worse and now persistent.

## 2023-07-07 NOTE — ED PROVIDER NOTE - CLINICAL SUMMARY MEDICAL DECISION MAKING FREE TEXT BOX
known brain mass with new ams/ right weakness. stroke code called on arrival for cva symptoms.  initial ct head with concern for mass vs bleed.  ct perfusion and cta head/ neck obtained. no signs or symptoms of infection clinically.  no chest pain to suggest acs.  case discussed with stoke/ neurosurgery. MRI ordered to further eval mass. keppra for seizure prophylaxis. zofran for nausea. admit to tele neurosurgery for further treatment

## 2023-07-07 NOTE — ED ADULT TRIAGE NOTE - CHIEF COMPLAINT QUOTE
Pt c/o headache, vomiting, abdominal pain x last night. Pt accompanied by family members, "he has a glioblastoma and we are worried it is related to that or heat exhaustion". Per family, "he seems more confused, he cannot remember our names. Can barely walk, his L side seems weak, he normally talks more". EKG in progress, UG to MD Harris Pt c/o headache, vomiting, abdominal pain x last night. Pt accompanied by family members, last saw him at 6pm yesterday and was at baseline. Per family "he has a glioblastoma and we are worried it is related to that or heat exhaustion". Per family, "he seems more confused, he cannot remember our names. Can barely walk, his L side seems weak, he normally talks more". EKG in progress, UG to MD Harris, stroke code initiated in triage. Pt c/o headache, vomiting, abdominal pain x last night. Pt accompanied by family members, last saw him at 6pm yesterday and was at baseline. Per family "he has a glioblastoma and we are worried it is related to that or heat exhaustion". Per family, "he seems more confused, he cannot remember our names. Can barely walk, his R side seems weak, he normally talks more". PMH hypotension, HLD, L sided craniotomy.EKG in progress, UG to MD Harris, stroke code initiated in triage.

## 2023-07-07 NOTE — H&P ADULT - NSHPPHYSICALEXAM_GEN_ALL_CORE
GENERAL: Awake and alert. Selectively answers questions and follows some basic commands.  HEENT: Normocephalic and atraumatic. Pupils equal and reactive to light bilaterally. Extraocular muscles intact. Nares patent.  NECK: Supple, normal range of motion.  RESPIRATORY: Normal respiratory effort. Clear breath sounds bilaterally without wheezing, rhonchi or rales auscultated.  CARDIOVASCULAR: Regulate rate and rhythm. Normal S1 and S2, without murmurs, gallops or rubs auscultated.  ABDOMEN: Soft, non-distended, non-tender. Normal bowel sounds in all four quadrants.  EXTREMITIES: 4/5 muscle strength in left and right upper and lower extremities. Range of motion not limited and sensations intact bilaterally anteriorly and posteriorly. Capillary refill less then 2 seconds. 2+ Dorsalis Pedis and Posterior Tibial pulses equal bilaterally.  NEUROLOGY: A&Ox2 (not to time). Cranial Nerves II to XII grossly intact. 2+ Biceps and Patellar reflexes bilaterally. Gait unable to be assessed.  SKIN: Intact. No rashes, lesions or erythema.

## 2023-07-07 NOTE — CONSULT NOTE ADULT - ASSESSMENT
68y Male with PMHx of type 2 diabetes, hyperlipidemia, iron deficiency anemia, tracheal stenosis s/p tracheostomy (which was closed by ENT 7 days ago); glioblastoma s/p resection 1/27/2022, and Avastin treatment 3/2023 presenting with altered mental status and weakness. Per family, they last saw him last night around 630pm when he was at baseline. Patient has intermittent expressive aphasia but is getting worse and now is persistent, and has intermittent nausea and vomiting since last night. Patient was supposed to receive an outpatient brain MRI on 7/25/2023.  Stroke code performed, Head CT shows Interval development of a 13 mm hyperdense nodule along the inferior margin of the resection cavity which may represent progression of disease with hemorrhage. CTA neg. CTP with CBF <30% volume is 0 mL.  The Tmax > 6s volume is 25 mL. The mismatch volume is 25 mL. Images are degraded by motion. The areas of elevated Tmax within the left greater than rightperiventricular subcortical white matter which is of uncertain etiology. The color maps demonstrate increased cerebral blood volume within the inferior left frontal lobe (series 331 image 5) which may represent site of tumoral disease given patient's history of glioblastoma.     OOW for tnk. No LVO on CTA. Recommend neurosurgery consult for further eval. No further stroke w/u indicated. Discussed with Dr. Tanner

## 2023-07-07 NOTE — PROGRESS NOTE ADULT - SUBJECTIVE AND OBJECTIVE BOX
***********************************************  ADULT NSICU PROGRESS NOTE  JORDAN CHAKRABORTY [id number] [facility] [id number]  ***********************************************    24H INTERVAL EVENTS:    ROS: negative except per mentioned above in 24h interval events.      HOSPITAL COURSE CARRIED FORWARD:    VITALS:    ICU Vital Signs Last 24 Hrs  T(C): 36.8 (07 Jul 2023 17:41), Max: 36.8 (07 Jul 2023 17:41)  T(F): 98.3 (07 Jul 2023 17:41), Max: 98.3 (07 Jul 2023 17:41)  HR: 65 (07 Jul 2023 18:00) (65 - 82)  BP: 141/86 (07 Jul 2023 17:30) (117/80 - 160/89)  BP(mean): 109 (07 Jul 2023 17:30) (109 - 109)  ABP: --  ABP(mean): --  RR: 16 (07 Jul 2023 17:30) (16 - 18)  SpO2: 97% (07 Jul 2023 18:00) (97% - 100%)    O2 Parameters below as of 07 Jul 2023 18:00  Patient On (Oxygen Delivery Method): room air                I&O's Summary      EXAM:     Skylar Coma Scale:     General: normocephalic, atraumatic, laying in bed, in no distress  Neuro     MS: A/Ox3, cooperative, normal attention, no neglect, comprehension intact, speech with preserved fluency, naming, and repetition    CN: PERRL, VF FTC, EOMI and no ptosis bilaterally, sensation intact to crude touch V1-V3, face symmetric, hearing grossly intact    Mot: bulk normal, tone normal, power 5/5 in bilateral upper and lower proximal extremities    Sens: intact to crude touch in bilateral upper and lower extremities    Reflexes: deferred    Coord: no dysmetria or ataxia on finger to nose/heel to shin, respectively, no focal bradykinetic movements    Gait: deferred  Chest: nonlabored respirations, no adventitious lung sounds bilaterally, heart regular rate/rhythm, present S1/S2, no murmurs or rubs  Abdomen: nondistended, soft and nontender without peritoneal signs, normoactive bowel sounds  Extremities: no clubbing, well-perfused, no edema    LABORATORY DATA:                            12.9   7.55  )-----------( 129      ( 07 Jul 2023 10:52 )             36.2     07-07    118<LL>  |  85<L>  |  5<L>  ----------------------------<  101<H>  4.3   |  23  |  0.59    Ca    8.3<L>      07 Jul 2023 16:12    TPro  7.3  /  Alb  4.6  /  TBili  0.9  /  DBili  x   /  AST  16  /  ALT  13  /  AlkPhos  70  07-07    LIVER FUNCTIONS - ( 07 Jul 2023 10:52 )  Alb: 4.6 g/dL / Pro: 7.3 g/dL / ALK PHOS: 70 U/L / ALT: 13 U/L / AST: 16 U/L / GGT: x           PT/INR - ( 07 Jul 2023 10:52 )   PT: 13.1 sec;   INR: 1.10          PTT - ( 07 Jul 2023 10:52 )  PTT:25.8 sec    IMAGING DATA:    CARDIOLOGY DATA:    MICROBIOLOGY DATA:        MEDICATIONS  (STANDING):  atorvastatin 20 milliGRAM(s) Oral at bedtime  chlorhexidine 2% Cloths 1 Application(s) Topical <User Schedule>  dexAMETHasone     Tablet 4 milliGRAM(s) Oral every 12 hours  escitalopram 5 milliGRAM(s) Oral daily  fluticasone propionate 50 MICROgram(s)/spray Nasal Spray 1 Spray(s) Both Nostrils two times a day  glucagon  Injectable 1 milliGRAM(s) IntraMuscular once  insulin lispro (ADMELOG) corrective regimen sliding scale   SubCutaneous three times a day before meals  levETIRAcetam 750 milliGRAM(s) Oral two times a day  melatonin 5 milliGRAM(s) Oral at bedtime  pantoprazole    Tablet 40 milliGRAM(s) Oral before breakfast  senna 2 Tablet(s) Oral at bedtime  sodium chloride 3 Gram(s) Oral every 6 hours  sodium chloride 3%. 500 milliLiter(s) (30 mL/Hr) IV Continuous <Continuous>    MEDICATIONS  (PRN):  acetaminophen     Tablet .. 650 milliGRAM(s) Oral every 6 hours PRN Temp greater or equal to 38C (100.4F), Mild Pain (1 - 3)  ondansetron Injectable 4 milliGRAM(s) IV Push once PRN Nausea and/or Vomiting      ***********************************************  ASSESSMENT AND PLAN  ***********************************************    This is a case of ***    NEURO  - Admit NSICU, Q1h neuro checks / Q1h vital signs    PULM  - SpO2 goal > 92%, supplemental O2 and pulm toileting as needed    CARDIO  - BP goal:     GI  - Diet:   - Stress ulcer prophylaxis:     /RENAL  - Monitor UOP/volume status, BUN/SCr    HEME  - Maintain Hb > 7.0, PLT > ***    ID  - Monitor for infectious s/s, fever curve, leukocytosis    ENDO    07-07-23 @ 19:10       ***********************************************  ADULT NSICU PROGRESS NOTE  JORDAN CHAKRABORTY [id number] [facility] [id number]  ***********************************************    Brief HPI: 69 yo M with history of GBM s/p resection (1/2022), hyponatremia, non-obstructive CAD/HLD/T2DM, tracheostomy s/p decannulation (7d PTA), tracheal stenosis, and JAGDISH admitted to NSICU for management of hyponatremia and possible progression of brain tumor.  The patient presented to ED with worsening aphasia nausea and vomiting.  Stroke code initially called.  CTH showing hyperdense nodule representing hemorrhagic progression of CNS disease, no LVO, no perfusion deficit matching a vascular territory, and SNa 115 for which patient admitted to NSICU.      ROS: negative except per mentioned above in 24h interval events.      HOSPITAL COURSE CARRIED FORWARD:    VITALS:    ICU Vital Signs Last 24 Hrs  T(C): 36.8 (07 Jul 2023 17:41), Max: 36.8 (07 Jul 2023 17:41)  T(F): 98.3 (07 Jul 2023 17:41), Max: 98.3 (07 Jul 2023 17:41)  HR: 65 (07 Jul 2023 18:00) (65 - 82)  BP: 141/86 (07 Jul 2023 17:30) (117/80 - 160/89)  BP(mean): 109 (07 Jul 2023 17:30) (109 - 109)  ABP: --  ABP(mean): --  RR: 16 (07 Jul 2023 17:30) (16 - 18)  SpO2: 97% (07 Jul 2023 18:00) (97% - 100%)    O2 Parameters below as of 07 Jul 2023 18:00  Patient On (Oxygen Delivery Method): room air                I&O's Summary      EXAM:     Skylar Coma Scale: 4/2/5    General: normocephalic, atraumatic, laying in bed, in no distress  Neuro     MS: lethargic, oriented x0, cooperative, poor attention, no neglect, poor comprehension, paraphasic errors, poor fluency    CN: PERRL, EOMI and no ptosis bilaterally, face symmetric, hearing grossly intact    Mot: bulk normal, tone normal, power UPPER (R/L) 5/5, LOWER (R/L) 3(at least)/3 (at least)  Chest: nonlabored respirations, no adventitious lung sounds bilaterally, heart regular rate/rhythm, present S1/S2, no murmurs or rubs  Abdomen: nondistended, soft and nontender without peritoneal signs, normoactive bowel sounds  Extremities: no clubbing, well-perfused, no edema    LABORATORY DATA:                            12.9   7.55  )-----------( 129      ( 07 Jul 2023 10:52 )             36.2     07-07    118<LL>  |  85<L>  |  5<L>  ----------------------------<  101<H>  4.3   |  23  |  0.59    Ca    8.3<L>      07 Jul 2023 16:12    TPro  7.3  /  Alb  4.6  /  TBili  0.9  /  DBili  x   /  AST  16  /  ALT  13  /  AlkPhos  70  07-07    LIVER FUNCTIONS - ( 07 Jul 2023 10:52 )  Alb: 4.6 g/dL / Pro: 7.3 g/dL / ALK PHOS: 70 U/L / ALT: 13 U/L / AST: 16 U/L / GGT: x           PT/INR - ( 07 Jul 2023 10:52 )   PT: 13.1 sec;   INR: 1.10          PTT - ( 07 Jul 2023 10:52 )  PTT:25.8 sec    IMAGING DATA:    CARDIOLOGY DATA:    MICROBIOLOGY DATA:        MEDICATIONS  (STANDING):  atorvastatin 20 milliGRAM(s) Oral at bedtime  chlorhexidine 2% Cloths 1 Application(s) Topical <User Schedule>  dexAMETHasone     Tablet 4 milliGRAM(s) Oral every 12 hours  escitalopram 5 milliGRAM(s) Oral daily  fluticasone propionate 50 MICROgram(s)/spray Nasal Spray 1 Spray(s) Both Nostrils two times a day  glucagon  Injectable 1 milliGRAM(s) IntraMuscular once  insulin lispro (ADMELOG) corrective regimen sliding scale   SubCutaneous three times a day before meals  levETIRAcetam 750 milliGRAM(s) Oral two times a day  melatonin 5 milliGRAM(s) Oral at bedtime  pantoprazole    Tablet 40 milliGRAM(s) Oral before breakfast  senna 2 Tablet(s) Oral at bedtime  sodium chloride 3 Gram(s) Oral every 6 hours  sodium chloride 3%. 500 milliLiter(s) (30 mL/Hr) IV Continuous <Continuous>    MEDICATIONS  (PRN):  acetaminophen     Tablet .. 650 milliGRAM(s) Oral every 6 hours PRN Temp greater or equal to 38C (100.4F), Mild Pain (1 - 3)  ondansetron Injectable 4 milliGRAM(s) IV Push once PRN Nausea and/or Vomiting      ***********************************************  ASSESSMENT AND PLAN  ***********************************************      #Metabolic encephalopathy   #GBM s/p resection (1/2022), TMZ/avastin, followed by Dr. Dorado, ?progression of disease w/ hemorrhagic component  #Euvolemic hypotonic hyponatremia  #History of HLD, nonobstructive CAD  #History of tracheal stenosis s/p trach & decannulation (7d PTA)  #History of iron deficiency anemia  #History of type 2 diabetes    NEURO  - Admit NSICU, Q1h neuro checks / Q1h vital signs, Dr. Dorado notified  - MRI brain w/wo abdiel  - Dex 4q12, /750  - Pain control, PT/OT when able to participate    PULM  - SpO2 goal > 92%, supplemental O2 and pulm toileting as needed    CARDIO  - BP goal: SBP < 160    GI  - Diet: pending swallow evaluation  - Stress ulcer prophylaxis: yes, on steroids  - Stool count, bowel regimen    /RENAL  - 3% NaCl at 30cc/h, NaCl tabs 3g q6h, q4h chem-7, goal SNa eunatremia  - Urine sodium, urine osmolality  - Monitor UOP/volume status, BUN/SCr    HEME  - Maintain Hb > 7.0, PLT > 100,000 given possibility of intracranial hemorrhage (stable)  - SCDs, SQL (PBD #1)    ID  - Check urine culture, blood culture, CXR  - Monitor for infectious s/s, fever curve, leukocytosis    ENDO  - RASSI  - Goal SGlu < 200    07-07-23 @ 19:10

## 2023-07-07 NOTE — ED ADULT NURSE NOTE - NSFALLRISKINTERV_ED_ALL_ED
Assistance OOB with selected safe patient handling equipment if applicable/Assistance with ambulation/Communicate fall risk and risk factors to all staff, patient, and family/Monitor gait and stability/Provide visual cue: yellow wristband, yellow gown, etc/Reinforce activity limits and safety measures with patient and family/Call bell, personal items and telephone in reach/Instruct patient to call for assistance before getting out of bed/chair/stretcher/Non-slip footwear applied when patient is off stretcher/[place] to call system/Physically safe environment - no spills, clutter or unnecessary equipment/Purposeful Proactive Rounding/Room/bathroom lighting operational, light cord in reach

## 2023-07-07 NOTE — CONSULT NOTE ADULT - SUBJECTIVE AND OBJECTIVE BOX
**STROKE CODE CONSULT NOTE**    Last known well time/Time of onset of symptoms: 18:30 on 7/6    HPI: 68y Male with PMHx of type 2 diabetes, hyperlipidemia, iron deficiency anemia, tracheal stenosis s/p tracheostomy (which was closed by ENT 7 days ago); glioblastoma s/p resection 1/27/2022, and Avastin treatment 3/2023 presenting with altered mental status and weakness. Per family, they last saw him last night around 630pm when he was at baseline. Patient has intermittent expressive aphasia but is getting worse and now is persistent, and has intermittent nausea and vomiting since last night. Patient was supposed to receive an outpatient brain MRI on 7/25/2023.  Stroke code performed, Head CT shows Interval development of a 13 mm hyperdense nodule along the inferior margin of the resection cavity which may represent progression of disease with hemorrhage. CTA neg. CTP with CBF <30% volume is 0 mL.  The Tmax > 6s volume is 25 mL. The mismatch volume is 25 mL. Images are degraded by motion. The areas of elevated Tmax within the left greater than rightperiventricular subcortical white matter which is of uncertain etiology. The color maps demonstrate increased cerebral blood volume within the inferior left frontal lobe (series 331 image 5) which may represent site of tumoral disease given patient's history of glioblastoma.    Per daughter at bedside, Entresto and Eliquis (DVT treatment and afib 2/2 takutsobo cardiomyopathy) were stopped per his PC - cardiologist.     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      Type 2 diabetes mellitus      Hyperlipidemia      Iron deficiency anemia      Nephrolithiasis      Thrombocytopenia      Hyponatremia      BPH (benign prostatic hyperplasia)      S/P craniotomy      H/O tracheostomy          FAMILY HISTORY:  FH: diabetes mellitus (Father, Mother)    FH: hyperlipidemia (Father)    FH: hypertension (Father, Mother)        SOCIAL HISTORY:   Patient lives with roommate  Smoking status: denies  Drinking: denies  Drug Use: denies    ROS:   see HPI    MEDICATIONS  (STANDING):  atorvastatin 20 milliGRAM(s) Oral at bedtime  chlorhexidine 2% Cloths 1 Application(s) Topical <User Schedule>  dexAMETHasone     Tablet 4 milliGRAM(s) Oral every 12 hours  escitalopram 5 milliGRAM(s) Oral daily  fluticasone propionate 50 MICROgram(s)/spray Nasal Spray 1 Spray(s) Both Nostrils two times a day  glucagon  Injectable 1 milliGRAM(s) IntraMuscular once  insulin lispro (ADMELOG) corrective regimen sliding scale   SubCutaneous three times a day before meals  levETIRAcetam 750 milliGRAM(s) Oral two times a day  melatonin 5 milliGRAM(s) Oral at bedtime  pantoprazole    Tablet 40 milliGRAM(s) Oral before breakfast  senna 2 Tablet(s) Oral at bedtime  sodium chloride 3 Gram(s) Oral every 6 hours  sodium chloride 3%. 500 milliLiter(s) (30 mL/Hr) IV Continuous <Continuous>    MEDICATIONS  (PRN):  acetaminophen     Tablet .. 650 milliGRAM(s) Oral every 6 hours PRN Temp greater or equal to 38C (100.4F), Mild Pain (1 - 3)  ondansetron Injectable 4 milliGRAM(s) IV Push once PRN Nausea and/or Vomiting    Allergies    amoxicillin (Rash)    Intolerances      Vital Signs Last 24 Hrs  T(C): 36.8 (07 Jul 2023 17:41), Max: 36.8 (07 Jul 2023 17:41)  T(F): 98.3 (07 Jul 2023 17:41), Max: 98.3 (07 Jul 2023 17:41)  HR: 63 (07 Jul 2023 19:00) (63 - 82)  BP: 135/79 (07 Jul 2023 19:00) (117/80 - 160/89)  BP(mean): 102 (07 Jul 2023 19:00) (102 - 109)  RR: 18 (07 Jul 2023 19:00) (16 - 18)  SpO2: 98% (07 Jul 2023 19:00) (97% - 100%)    Parameters below as of 07 Jul 2023 19:00  Patient On (Oxygen Delivery Method): room air        Physical exam:  Neurologic:  -Mental status: Awake, alert, altered, repeatedly saying "yes." Expressive and receptive aphasia. Unable to assess orientation due to aphasia. Follows simple commands.   -Cranial nerves:   II: unable to assess  III, IV, VI: Extraocular movements are intact without nystagmus. Pupils equally round and reactive to light  V:  unable to assess  VII: Face is symmetric with normal eye closure and smile  Motor: Normal bulk and tone. No pronator drift. DUMONT AG x 4.   Sensation: withdraws to noxious x 4  Coordination: unable to assess  Gait: deferred    NIHSS: 8    Fingerstick Blood Glucose: CAPILLARY BLOOD GLUCOSE  127 (07 Jul 2023 19:28)      POCT Blood Glucose.: 127 mg/dL (07 Jul 2023 10:30)    LABS:                        12.9   7.55  )-----------( 129      ( 07 Jul 2023 10:52 )             36.2     07-07    118<LL>  |  85<L>  |  5<L>  ----------------------------<  101<H>  4.3   |  23  |  0.59    Ca    8.3<L>      07 Jul 2023 16:12    TPro  7.3  /  Alb  4.6  /  TBili  0.9  /  DBili  x   /  AST  16  /  ALT  13  /  AlkPhos  70  07-07    PT/INR - ( 07 Jul 2023 10:52 )   PT: 13.1 sec;   INR: 1.10          PTT - ( 07 Jul 2023 10:52 )  PTT:25.8 sec      Urinalysis Basic - ( 07 Jul 2023 16:12 )    Color: x / Appearance: x / SG: x / pH: x  Gluc: 101 mg/dL / Ketone: x  / Bili: x / Urobili: x   Blood: x / Protein: x / Nitrite: x   Leuk Esterase: x / RBC: x / WBC x   Sq Epi: x / Non Sq Epi: x / Bacteria: x        RADIOLOGY & ADDITIONAL STUDIES:  < from: CT Brain Stroke Protocol (07.07.23 @ 10:51) >    IMPRESSION: Interval development of a 13 mm hyperdense nodule along the   inferior margin of the resection cavity which may represent progression   of disease with hemorrhage. The findings would be better evaluated with a   MRI of the brain.    < end of copied text >    < from: CT Angio Brain Stroke Protocol  w/ IV Cont (07.07.23 @ 10:55) >    IMPRESSION:    Intracranial CTA: No large vessel occlusion or high-grade stenosis.    Extracranial CTA: No significant steno-occlusive disease.    < end of copied text >    < from: CT Brain Perfusion Maps Stroke (07.07.23 @ 10:54) >  The CBF <30% volume is 0 mL.  The Tmax > 6s volume is 25 mL.    The mismatch volume is 25 mL. Images are degraded by motion. The areas of   elevated Tmax within the left greater than rightperiventricular   subcortical white matter which is of uncertain etiology.    The color maps demonstrate increased cerebral blood volume within the   inferior left frontal lobe (series 331 image 5) which may represent site   of tumoral disease given patient's history of glioblastoma.    IMPRESSION: CT perfusion metrics as above.    < end of copied text >      -----------------------------------------------------------------------------------------------------------------  IV-tenecteplase (Y/N):    no, OOW

## 2023-07-07 NOTE — ED PROVIDER NOTE - NEUROLOGICAL, MLM
Alert, oriented x0, follows some basic commands but not formal neuro. right leg appears weak (can't hold up against gravity).

## 2023-07-07 NOTE — H&P ADULT - NSHPLABSRESULTS_GEN_ALL_CORE
12.9   7.55  )-----------( 129      ( 07 Jul 2023 10:52 )             36.2   07-07    115<LL>  |  80<L>  |  5<L>  ----------------------------<  135<H>  4.8   |  25  |  0.73    Ca    9.1      07 Jul 2023 10:52    TPro  7.3  /  Alb  4.6  /  TBili  0.9  /  DBili  x   /  AST  16  /  ALT  13  /  AlkPhos  70  07-07  PT/INR - ( 07 Jul 2023 10:52 )   PT: 13.1 sec;   INR: 1.10          PTT - ( 07 Jul 2023 10:52 )  PTT:25.8 sec 12.9   7.55  )-----------( 129      ( 07 Jul 2023 10:52 )             36.2   07-07    115<LL>  |  80<L>  |  5<L>  ----------------------------<  135<H>  4.8   |  25  |  0.73    Ca    9.1      07 Jul 2023 10:52    TPro  7.3  /  Alb  4.6  /  TBili  0.9  /  DBili  x   /  AST  16  /  ALT  13  /  AlkPhos  70  07-07  PT/INR - ( 07 Jul 2023 10:52 )   PT: 13.1 sec;   INR: 1.10          PTT - ( 07 Jul 2023 10:52 )  PTT:25.8 sec    head CT: COMPARISON: CT head 3/8/2023; MRI's brain 5/25/2023 and 3/20/2023    FINDINGS: The CT examination demonstrates the patient to be status post   left frontal craniotomy. There is a small extra-axial fluid collection   along the left frontal convexity. There is a resection cavity left.There   has been interval development of a approximately 13 mm hyperdense nodule   along the inferior and medial margin of the resection cavity (series 3   image 10, series 7 image 27 and series 6 image 22). This may represent   interval progression of tumoral disease with hemorrhagic component. There   are remains hypodensity surrounding the resection cavity with extension   along the corpus callosum to the right perifrontal white matter. The   hypodensity within the left frontal lobe appears similar with the recent   MRI, however, is progressed when the earlier CT. These findings would be   better evaluated with an MRI. The ventricles are prominent, however, are   stable in size.    The bony windows demonstrates no fractures. There is minimum mucosal   thickening within the right maxillary sinus and opacification of   posterior left ethmoid air cell. The rest of the visualized paranasal   sinuses are within normal limits. The mastoid air cells are well aerated.    The study was performed at 10:47 am and the above findings were discussed   with Gregorio Eldridge at 10:53 am.    IMPRESSION: Interval development of a 13 mm hyperdense nodule along the   inferior margin of the resection cavity which may represent progression   of diseasewith hemorrhage. The findings would be better evaluated with a   MRI of the brain.

## 2023-07-07 NOTE — ED ADULT NURSE NOTE - OBJECTIVE STATEMENT
68y Male A&OX3 to ED c/o confusion. Pt noted to have Global aphasia. Not answering questions at this time. Per family at bedside. Last known well was 18:00 yesterday. Per family patient had multiple episodes of vomiting last night. Denies blood in Emesis. In the am Family noted pt to be confused with weakness to right side. Per son "we thought it was because of how hot the apartment was at first and tried to cool him down". Stroke code activated. Pt to CT scan. Pt has hx of glioblastoma with resection 1/27/2022. Family reports patients baseline is A&OX3 and "sleepy". 68y Male A&OX3 to ED c/o confusion. Pt noted to have Global aphasia. Not answering questions at this time. Per family at bedside. Last known well was 18:00 yesterday. Per family patient had multiple episodes of vomiting last night. Denies blood in Emesis. In the am Family noted pt to be confused with weakness to right side. Per son "we thought it was because of how hot the apartment was at first and tried to cool him down". Stroke code activated. Pt to CT scan. Pt has hx of glioblastoma with resection 1/27/2022. Family reports patients baseline is A&OX3 and "sleepy". Takes 81mg Aspirin daily.

## 2023-07-07 NOTE — ED ADULT NURSE NOTE - IS THE PATIENT ABLE TO BE SCREENED?
Caller: Sharyn Jones    Relationship: Self    Best call back number: [phone]    What is the best time to reach you: ANYTIME    CAN LEAVE VOICEMAIL IF UNABLE TO REACH     Who are you requesting to speak with (clinical staff, provider,  specific staff member): DR YAGN AND HIS NURSE      What was the call regarding:     WAS SUPPOSED TO POSSIBLY START ON THE WARFARIN, AND ALSO SOMEONE WAS LOOKING AT APPROVING HOME MONITORING DEVICE  BUT HAS NOT HEARD BACK ON THAT AND BEING APPROVED WITH INSURANCE FOLLOWING UP ON THIS     HAD SEVERE AUTO IMMUNE FLARE UP AFTER THE LAST IRON INFUSION, CONCERNED REGARDING THIS AS WELL     Do you require a callback: YES TO DISCUSS             
Explained to patient she will need to be on Coumadin at least 6 months before insurance will approve home monitoring. Patient also verbalized she had muscle pain after IV Venofer infusions. Patient to remind Dr Burgos before next IV Venofer infusions so pre-medications may be ordered. Patient will call back to schedule appointments once she starts on Coumadin so INR can be checked.  Patient verbalized having heavy periods but does not have a GYN at this time. Told patient to try to contact [facility]  
No

## 2023-07-07 NOTE — ED PROVIDER NOTE - OBJECTIVE STATEMENT
PMH of DM II, HLD, JAGDISH, hyponatremia, tracheal stenosis s/p tracheostomy, and glioblastoma s/p resection 1/27/2022, Avastin treatment 3/23. Here with confusion, weakness. Per family, saw him last night about 6pm, seemed to be baseline. Called by roommate that he was up all night vomiting and confused. Found him this am confused (unable to say daughters name) and seemed weak on right. Denies fever

## 2023-07-07 NOTE — ED PROVIDER NOTE - CONSTITUTIONAL, MLM
normal... chronically ill appearing, awake, alert, unable to answer orientation questions but follows some basic commands

## 2023-07-07 NOTE — ED ADULT NURSE NOTE - CHIEF COMPLAINT QUOTE
Pt c/o headache, vomiting, abdominal pain x last night. Pt accompanied by family members, last saw him at 6pm yesterday and was at baseline. Per family "he has a glioblastoma and we are worried it is related to that or heat exhaustion". Per family, "he seems more confused, he cannot remember our names. Can barely walk, his R side seems weak, he normally talks more". PMH hypotension, HLD, L sided craniotomy.EKG in progress, UG to MD Harris, stroke code initiated in triage.

## 2023-07-07 NOTE — H&P ADULT - UNABLE TO OBTAIN: SOCIAL HISTORY SUBSTANCE USE
Post-Dialysis RN Treatment Note    Blood Pressure:  Pre 145/97 mm/Hg  Post 115 mmHg   EDW  74 kg    Weight:  Pre 79 2 kg   Post 76 2 kg   Mode of weight measurement: Bed Scale   Volume Removed  3000 ml    Treatment duration 4 hours   NS given  No    Treatment shortened?  No   Medications given during Rx Not Applicable   Estimated Kt/V  Not Applicable   Access type: AV fistula   Access Status: No    Report called to primary nurse   Yes
Post-Dialysis RN Treatment Note    Blood Pressure:  Pre 155/80mm/Hg  Post 143/80 mmHg   EDW  74 5 kg    Weight:  Pre 77 5 kg   Post 75 kg   Mode of weight measurement: Bed Scale   Volume Removed  2500 ml    Treatment duration 240 minutes    NS given  No    Treatment shortened? No   Medications given during Rx Not Applicable   Estimated Kt/V 1 59   Access type: AV fistula   Access Status: Left upper arm fistula cannulated with #15g needles x2 without difficulty  Able to achieve and maintain BFR as ordered  +bruit/thrill   Verbal report given to [facility] RN    Pt c/o left arm pain at the start of HD  Rated pain 7 on 1-10 scale  Fistulagram was done yesterday  Pt was medicated with dilaudid for c/o pain by primary nurse and effective  Pt became mildly hypotensive during last half of treatment  Asymptomatic  Decreased UF goal x 1  Treatment completed without any further drop in BP  Pt tolerated HD well 
Altered mentation

## 2023-07-07 NOTE — H&P ADULT - HISTORY OF PRESENT ILLNESS
Osiel Norwood is a 69YO male with a past medical history of type 2 diabetes, hyperlipidemia, iron deficiency anemia, tracheal stenosis s/p tracheostomy, glioblastoma s/p resection 1/27/2022, and Avastin treatment 3/2023 presenting with altered mental status and weakness x2 days. Per family, they last saw him last night around 6pm when he was at baseline. Patient has intermittent expressive aphasia but is getting worse and now is persistent, and has intermittent nausea and vomiting since last night. Patient was supposed to receive an outpatient brain MRI on 7/25/2023.     Osiel Norwood is a 69YO male with a past medical history of type 2 diabetes, hyperlipidemia, iron deficiency anemia, tracheal stenosis s/p tracheostomy (which was closed by ENT 7 days ago); glioblastoma s/p resection 1/27/2022, and Avastin treatment 3/2023 presenting with altered mental status and weakness x2 days. Per family, they last saw him last night around 6pm when he was at baseline. Patient has intermittent expressive aphasia but is getting worse and now is persistent, and has intermittent nausea and vomiting since last night. Patient was supposed to receive an outpatient brain MRI on 7/25/2023.  Stroke code performed in  is negative for CVA. Head CT shows new hyperdensity in frontal parietal - surgical site. MRI is ordered to rule out tumor recurrence.   Per daughter at bedside, Entresto and Eliquis were stopped per his PC - cardiologist.

## 2023-07-08 NOTE — PROGRESS NOTE ADULT - SUBJECTIVE AND OBJECTIVE BOX
PM EVENTS: no acute overnight events as of documentation time of this note.    ROS: negative except as mentioned above.    T(C): 36.4 (07-08-23 @ 17:55), Max: 37.8 (07-08-23 @ 05:36)  HR: 80 (07-08-23 @ 19:00) (58 - 80)  BP: 141/81 (07-08-23 @ 19:00) (108/64 - 145/79)  RR: 14 (07-08-23 @ 19:00) (14 - 18)  SpO2: 97% (07-08-23 @ 19:00) (95% - 98%)        I&O's Summary    07 Jul 2023 07:01  -  08 Jul 2023 07:00  --------------------------------------------------------  IN: 450 mL / OUT: 1100 mL / NET: -650 mL    08 Jul 2023 07:01  -  08 Jul 2023 19:39  --------------------------------------------------------  IN: 1130 mL / OUT: 975 mL / NET: 155 mL        EXAM:     [place] Coma Scale:     General: normocephalic, atraumatic, laying in bed, in no distress  Neuro     MS: A/Ox3, cooperative, normal attention, no neglect, comprehension intact, speech with preserved fluency, naming, and repetition    CN: PERRL, VF FTC, EOMI and no ptosis bilaterally, sensation intact to crude touch V1-V3, face symmetric, hearing grossly intact    Mot: bulk normal, tone normal, power 5/5 in bilateral upper and lower proximal extremities    Sens: intact to crude touch in bilateral upper and lower extremities    Reflexes: deferred    Coord: no dysmetria or ataxia on finger to nose/heel to shin, respectively, no focal bradykinetic movements    Gait: deferred  Chest: nonlabored respirations, no adventitious lung sounds bilaterally, heart regular rate/rhythm, present S1/S2, no murmurs or rubs  Abdomen: nondistended, soft and nontender without peritoneal signs, normoactive bowel sounds  Extremities: no clubbing, well-perfused, no edema                              12.8   3.58  )-----------( 73       ( 08 Jul 2023 05:22 )             36.3     07-08    122<L>  |  90<L>  |  8   ----------------------------<  160<H>  5.0   |  17<L>  |  0.58    Ca    8.7      08 Jul 2023 16:17  Phos  4.0     07-08  Mg     1.7     07-08    TPro  7.3  /  Alb  4.6  /  TBili  0.9  /  DBili  x   /  AST  16  /  ALT  13  /  AlkPhos  70  07-07      Urinalysis with Rflx Culture (collected 07-08-23 @ 00:00)    Culture - Blood (collected 07-07-23 @ 23:47)  Source: .Blood Blood  Preliminary Report (07-08-23 @ 14:00):    No growth at 12 hours    Culture - Blood (collected 07-07-23 @ 23:47)  Source: .Blood Blood  Preliminary Report (07-08-23 @ 14:00):    No growth at 12 hours      MEDICATIONS  (STANDING):  atorvastatin 20 milliGRAM(s) Oral at bedtime  chlorhexidine 2% Cloths 1 Application(s) Topical <User Schedule>  dexAMETHasone     Tablet 4 milliGRAM(s) Oral every 12 hours  escitalopram 5 milliGRAM(s) Oral daily  fluticasone propionate 50 MICROgram(s)/spray Nasal Spray 1 Spray(s) Both Nostrils two times a day  glucagon  Injectable 1 milliGRAM(s) IntraMuscular once  insulin lispro (ADMELOG) corrective regimen sliding scale   SubCutaneous three times a day before meals  levETIRAcetam 750 milliGRAM(s) Oral two times a day  melatonin 5 milliGRAM(s) Oral at bedtime  senna 2 Tablet(s) Oral at bedtime  sodium chloride 3 Gram(s) Oral every 6 hours  sodium chloride 3%. 500 milliLiter(s) (30 mL/Hr) IV Continuous <Continuous>  sucralfate 1 Gram(s) Oral every 6 hours    MEDICATIONS  (PRN):  acetaminophen     Tablet .. 650 milliGRAM(s) Oral every 6 hours PRN Temp greater or equal to 38C (100.4F), Mild Pain (1 - 3)  ondansetron Injectable 4 milliGRAM(s) IV Push once PRN Nausea and/or Vomiting      Please see the day's note documented by  *** for detailed ongoing assessment and plan.  Additions to plan are as follows:    - None.         PM EVENTS: no acute overnight events as of documentation time of this note.    ROS: negative except as mentioned above.    Vital Signs Last 24 Hrs  T(C): 36.4 (08 Jul 2023 17:55), Max: 37.8 (08 Jul 2023 05:36)  T(F): 97.6 (08 Jul 2023 17:55), Max: 100 (08 Jul 2023 05:36)  HR: 80 (08 Jul 2023 20:00) (58 - 80)  BP: 121/61 (08 Jul 2023 20:00) (108/64 - 145/79)  BP(mean): 84 (08 Jul 2023 20:00) (82 - 106)  RR: 17 (08 Jul 2023 20:00) (14 - 18)  SpO2: 97% (08 Jul 2023 20:00) (95% - 98%)    Parameters below as of 08 Jul 2023 20:00  Patient On (Oxygen Delivery Method): room air      I&O's Detail    07 Jul 2023 07:01  -  08 Jul 2023 07:00  --------------------------------------------------------  IN:    sodium chloride 3%: 450 mL  Total IN: 450 mL    OUT:    Incontinent per Condom Catheter (mL): 1100 mL  Total OUT: 1100 mL    Total NET: -650 mL      08 Jul 2023 07:01  -  08 Jul 2023 21:41  --------------------------------------------------------  IN:    IV PiggyBack: 50 mL    Oral Fluid: 720 mL    sodium chloride 3%: 300 mL    sodium chloride 3%: 60 mL  Total IN: 1130 mL    OUT:    Voided (mL): 1350 mL  Total OUT: 1350 mL    Total NET: -220 mL                                12.8   3.58  )-----------( 73       ( 08 Jul 2023 05:22 )             36.3     07-08    122<L>  |  90<L>  |  8   ----------------------------<  160<H>  5.0   |  17<L>  |  0.58    Ca    8.7      08 Jul 2023 16:17  Phos  4.0     07-08  Mg     1.7     07-08    TPro  7.3  /  Alb  4.6  /  TBili  0.9  /  DBili  x   /  AST  16  /  ALT  13  /  AlkPhos  70  07-07      Urinalysis with Rflx Culture (collected 07-08-23 @ 00:00)    Culture - Blood (collected 07-07-23 @ 23:47)  Source: .Blood Blood  Preliminary Report (07-08-23 @ 14:00):    No growth at 12 hours    Culture - Blood (collected 07-07-23 @ 23:47)  Source: .Blood Blood  Preliminary Report (07-08-23 @ 14:00):    No growth at 12 hours      MEDICATIONS  (STANDING):  atorvastatin 20 milliGRAM(s) Oral at bedtime  chlorhexidine 2% Cloths 1 Application(s) Topical <User Schedule>  dexAMETHasone     Tablet 4 milliGRAM(s) Oral every 12 hours  escitalopram 5 milliGRAM(s) Oral daily  fluticasone propionate 50 MICROgram(s)/spray Nasal Spray 1 Spray(s) Both Nostrils two times a day  glucagon  Injectable 1 milliGRAM(s) IntraMuscular once  insulin lispro (ADMELOG) corrective regimen sliding scale   SubCutaneous three times a day before meals  levETIRAcetam 750 milliGRAM(s) Oral two times a day  melatonin 5 milliGRAM(s) Oral at bedtime  senna 2 Tablet(s) Oral at bedtime  sodium chloride 3 Gram(s) Oral every 6 hours  sodium chloride 3%. 500 milliLiter(s) (30 mL/Hr) IV Continuous <Continuous>  sucralfate 1 Gram(s) Oral every 6 hours    MEDICATIONS  (PRN):  acetaminophen     Tablet .. 650 milliGRAM(s) Oral every 6 hours PRN Temp greater or equal to 38C (100.4F), Mild Pain (1 - 3)  ondansetron Injectable 4 milliGRAM(s) IV Push once PRN Nausea and/or Vomiting      EXAM:       [place] Coma Scale: 4/4/6    General: normocephalic, atraumatic, laying in bed, in no distress  Neuro     MS: alert, oriented x0, cooperative, intact attention, no neglect, intact comprehension, occasional paraphasic errors, intact fluency    CN: PERRL, EOMI and no ptosis bilaterally, face symmetric, hearing grossly intact    Mot: bulk normal, tone normal, power UPPER (R/L) 5/5, LOWER (R/L) 5/5  Chest: nonlabored respirations, no adventitious lung sounds bilaterally, heart regular rate/rhythm, present S1/S2, no murmurs or rubs  Abdomen: nondistended, soft and nontender without peritoneal signs, normoactive bowel sounds  Extremities: no clubbing, well-perfused, no edema      Please see the day's note documented by Dr. Monsalve for detailed ongoing assessment and plan.  Additions to plan are as follows:    Frequent neurovitals  3% for hyponatremia < 8-12mEq/L correction per day, salt tabs, no neurosurgical plans during this admission, Dex  SBP < 150  Home LEV

## 2023-07-08 NOTE — PHYSICAL THERAPY INITIAL EVALUATION ADULT - ADDITIONAL COMMENTS
Family at bedside providing history. Reports pt lives in apartment with roommate, with elevator access. Reports that pt is independent with ambulation, has cane and rollator that he doesn't use. Reports that son in-law manages medications, and ADLs such as bathing. Reports that roommate assists with cooking and cleaning.

## 2023-07-08 NOTE — PHYSICAL THERAPY INITIAL EVALUATION ADULT - GENERAL OBSERVATIONS, REHAB EVAL
PT IE Completed. Chart reviewed. Pt received seated in chair, NAD, +IV, +[place], family present. JUMA Troy cleared pt for PT. PT IE Completed. Chart reviewed. MRS 4. Pt received seated in chair, NAD, +IV, +[place], family present. JUMA Troy cleared pt for PT.

## 2023-07-08 NOTE — CONSULT NOTE ADULT - SUBJECTIVE AND OBJECTIVE BOX
OTOLARYNGOLOGY (ENT) CONSULTATION NOTE    PATIENT: JORDAN CHAKRABORTY     MRN: [id number]       : 57  DATE OF ADMISSION:23  DATE OF SERVICE:  23 @ 14:40    CHIEF COMPLAINT: ***    HISTORY OF PRESENT ILLNESS:  JORDAN CHAKRABORTY  is a 68y Male with PMHx glioblastoma s/p resection 2022, complicated by prolonged intubation, development of tracheal stenosis, s/p endoscopic balloon dilation with CT surgery at [facility] 2022, followed by trach 2022. Patient follows with ENT outpatient (Dr. Caba) for trach care/stenosis.    Currently admitted to neurosurgery for AMS/weakness with workup for tumor recurrence ongoing Approximately 2 weeks ago, patient was in [place] and pulled out the tracheostomy tube state; followed up urgently upon returning to [facility] with outside ENT Dr. Caba on  who attempted to dilate stoma but determined it had already closed. Decision made with family at that time to leave patient decannulated as pt had no breathing issues and to repeat imaging to assess stenosis. Repeat CT neck performed  redemonstrating tracheal stenosis just above thoracic inlet around T1, noted to be improved compared to prior CT from . Narrowest tracheal lumen 9x8mm.    ENT consulted to assess stoma and airway in setting of known tracheal stenosis.     PAST MEDICAL HISTORY:  No pertinent past medical history    Diabetes mellitus    Hypertension    Glioblastoma    Type 2 diabetes mellitus    Hyperlipidemia    Iron deficiency anemia    Nephrolithiasis    Thrombocytopenia    Hyponatremia    BPH (benign prostatic hyperplasia)      CURRENT MEDICATIONS   acetaminophen     Tablet .. 650 Oral PRN  atorvastatin 20 Oral  chlorhexidine 2% Cloths 1 Topical  dexAMETHasone     Tablet 4 Oral  escitalopram 5 Oral  fluticasone propionate 50 MICROgram(s)/spray Nasal Spray 1 Both Nostrils  glucagon  Injectable 1 IntraMuscular  insulin lispro (ADMELOG) corrective regimen sliding scale  SubCutaneous  levETIRAcetam 750 Oral  melatonin 5 Oral  ondansetron Injectable 4 IV Push PRN  senna 2 Oral  sodium chloride 3 Oral  sucralfate 1 Oral      HOME MEDICATIONS:  aspirin 81 mg oral tablet  fluticasone 50 mcg/inh nasal spray  melatonin 3 mg oral tablet  senna (sennosides) 8.6 mg oral tablet      ALLERGIES:  amoxicillin (Rash)    SOCIAL HISTORY: Pertinent included in HPI   FAMILY HISTORY  No pertinent family history in first degree relatives    FH: diabetes mellitus (Father, Mother)    FH: hyperlipidemia (Father)    FH: hypertension (Father, Mother)        SURGICAL HISTORY:  No significant past surgical history    No significant past surgical history    S/P craniotomy    H/O tracheostomy    REVIEW OF SYSTEMS: The patient was asked and responded to a review of systems regarding the following symptoms: constitutional, eye, ears, nose, mouth, throat, cardiovascular, respiratory. Pertinent factors have been included in the HPI.     PHYSICAL EXAMINATION:  General: NAD, A+Ox3  Respiratory: No respiratory distress, stridor, or stertor  Voice quality: normal  Face:  Symmetric without masses or lesions  OU: EOMI  AD: Pinna wnl  AS: Pinna wnl  Nose: nasal cavity clear bilaterally, inferior turbinates normal, mucosa normal without crusting or bleeding  OC/OP: tongue normal, floor of mouth WNL, no masses or lesions, OP clear  Neck: soft/flat, no LAD. Well healed tracheostomy stoma to anterior neck with mild crusting and granulation tissue.   Neuro: CNII-XII grossly intact    LARYNGOSCOPY EXAM:   -Verbal consent was obtained from patient prior to procedure.    Indication:    Anesthesia: None    Flexible laryngoscopy was performed and revealed the following:    -- Nasopharynx had no mass or exudate.    -- Base of tongue was symmetric and not enlarged.    -- Vallecula was clear    -- Epiglottis, both aryepiglottic folds and both false vocal folds were normal    -- Arytenoids both without edema and erythema     -- True vocal folds with hypomobility (L<R) and restriction in abduction; no significant restriction in adduction noted    -- Subglottis partially visualized with some narrowing on left     -- Post cricoid area was clear.    -- Interarytenoid edema was absent    The patient tolerated the procedure well.    Vital Signs:  T(C): 36.2 (23 @ 09:00), Max: 37.8 (23 @ 05:36)  HR: 79 (23 @ 13:00) (58 - 80)  BP: 123/70 (23 @ 13:00) (108/64 - 160/89)  RR: 17 (23 @ 13:00) (16 - 18)  SpO2: 98% (23 @ 13:00) (96% - 100%)                        12.8   3.58  )-----------( 73       ( 2023 05:22 )             36.3        122<L>  |  88<L>  |  7   ----------------------------<  208<H>  5.1   |  19<L>  |  0.58    Ca    9.1      2023 11:07  Phos  4.0     -  Mg     1.7         TPro  7.3  /  Alb  4.6  /  TBili  0.9  /  DBili  x   /  AST  16  /  ALT  13  /  AlkPhos  70  07-   PT/INR - ( 2023 10:52 )   PT: 13.1 sec;   INR: 1.10          PTT - ( 2023 10:52 )  PTT:25.8 [id number]    MICROBIOLOGY:    Urinalysis with Rflx Culture (collected 23 @ 00:00)    Culture - Blood (collected 23 @ 23:47)  Source: .Blood Blood  Preliminary Report (23 @ 14:00):    No growth at 12 hours    Culture - Blood (collected 23 @ 23:47)  Source: .Blood Blood  Preliminary Report (23 @ 14:00):    No growth at 12 hours                  -------------------------------     OTOLARYNGOLOGY (ENT) CONSULTATION NOTE    PATIENT: JORDAN CHAKRABORTY     MRN: [id number]       : 57  DATE OF ADMISSION:23  DATE OF SERVICE:  23 @ 14:40    CHIEF COMPLAINT: self-decannulated    HISTORY OF PRESENT ILLNESS:  JORDAN CHAKRABORTY  is a 68y Male with PMHx glioblastoma s/p resection 2022, complicated by prolonged intubation, development of tracheal stenosis, s/p endoscopic balloon dilation with CT surgery at [facility] 2022, followed by trach 2022. Patient follows with ENT outpatient (Dr. Caba) for trach care/stenosis.    Currently admitted to neurosurgery for AMS/weakness with workup for tumor recurrence ongoing. Approximately 2 weeks ago, patient was in [place] and pulled out the tracheostomy tube; followed up urgently upon [facility] with ENT Dr. Caba on  who attempted to dilate stoma but determined it had already closed. Decision made with family at that time to leave patient decannulated as pt had no breathing issues and to repeat imaging to assess stenosis. Repeat CT neck performed  redemonstrating tracheal stenosis just above thoracic inlet around T1, noted to be improved compared to prior CT from . Narrowest tracheal lumen 9x8mm.    ENT consulted to assess stoma and airway in setting of known tracheal stenosis.     PAST MEDICAL HISTORY:  Diabetes mellitus  Hypertension  Glioblastoma  Type 2 diabetes mellitus  Hyperlipidemia  Iron deficiency anemia  Nephrolithiasis  Thrombocytopenia  Hyponatremia  BPH (benign prostatic hyperplasia)      CURRENT MEDICATIONS   acetaminophen     Tablet .. 650 Oral PRN  atorvastatin 20 Oral  chlorhexidine 2% Cloths 1 Topical  dexAMETHasone     Tablet 4 Oral  escitalopram 5 Oral  fluticasone propionate 50 MICROgram(s)/spray Nasal Spray 1 Both Nostrils  glucagon  Injectable 1 IntraMuscular  insulin lispro (ADMELOG) corrective regimen sliding scale  SubCutaneous  levETIRAcetam 750 Oral  melatonin 5 Oral  ondansetron Injectable 4 IV Push PRN  senna 2 Oral  sodium chloride 3 Oral  sucralfate 1 Oral    HOME MEDICATIONS:  aspirin 81 mg oral tablet  fluticasone 50 mcg/inh nasal spray  melatonin 3 mg oral tablet  senna (sennosides) 8.6 mg oral tablet    ALLERGIES:  amoxicillin (Rash)    SOCIAL HISTORY: Pertinent included in HPI   FAMILY HISTORY  FH: diabetes mellitus (Father, Mother)    FH: hyperlipidemia (Father)    FH: hypertension (Father, Mother)        SURGICAL HISTORY:  S/P craniotomy    H/O tracheostomy    REVIEW OF SYSTEMS: The patient was asked and responded to a review of systems regarding the following symptoms: constitutional, eye, ears, nose, mouth, throat, cardiovascular, respiratory. Pertinent factors have been included in the HPI.     PHYSICAL EXAMINATION:  General: NAD, A+Ox3  Respiratory: No respiratory distress, stridor, or stertor  Voice quality: normal  Face:  Symmetric without masses or lesions  OU: EOMI  AD: Pinna wnl  AS: Pinna wnl  Nose: nasal cavity clear bilaterally, inferior turbinates normal, mucosa normal without crusting or bleeding  OC/OP: tongue normal, floor of mouth WNL, no masses or lesions, OP clear  Neck: soft/flat, no LAD. Well healed tracheostomy stoma to anterior neck with mild crusting and granulation tissue.   Neuro: CNII-XII grossly intact    LARYNGOSCOPY EXAM:   -Verbal consent was obtained from patient prior to procedure.    Indication:    Anesthesia: None    Flexible laryngoscopy was performed and revealed the following:    -- Nasopharynx had no mass or exudate.    -- Base of tongue was symmetric and not enlarged.    -- Vallecula was clear    -- Epiglottis, both aryepiglottic folds and both false vocal folds were normal    -- Arytenoids both without edema and erythema     -- True vocal folds with hypomobility (L<R) and restriction in abduction; no significant restriction in adduction noted    -- Subglottis partially visualized with some narrowing on left     -- Post cricoid area was clear.    -- Interarytenoid edema was absent    The patient tolerated the procedure well.    Vital Signs:  T(C): 36.2 (23 @ 09:00), Max: 37.8 (23 @ 05:36)  HR: 79 (23 @ 13:00) (58 - 80)  BP: 123/70 (23 @ 13:00) (108/64 - 160/89)  RR: 17 (23 @ 13:00) (16 - 18)  SpO2: 98% (23 @ 13:00) (96% - 100%)                        12.8   3.58  )-----------( 73       ( 2023 05:22 )             36.3        122<L>  |  88<L>  |  7   ----------------------------<  208<H>  5.1   |  19<L>  |  0.58    Ca    9.1      2023 11:07  Phos  4.0       Mg     1.7         TPro  7.3  /  Alb  4.6  /  TBili  0.9  /  DBili  x   /  AST  16  /  ALT  13  /  AlkPhos  70     PT/INR - ( 2023 10:52 )   PT: 13.1 sec;   INR: 1.10          PTT - ( 2023 10:52 )  PTT:25.8 [id number]    MICROBIOLOGY:    Urinalysis with Rflx Culture (collected 23 @ 00:00)    Culture - Blood (collected 23 @ 23:47)  Source: .Blood Blood  Preliminary Report (23 @ 14:00):    No growth at 12 hours    Culture - Blood (collected 23 @ 23:47)  Source: .Blood Blood  Preliminary Report (23 @ 14:00):    No growth at 12 hours                  -------------------------------

## 2023-07-08 NOTE — CONSULT NOTE ADULT - ASSESSMENT
ASSESSMENT/PLAN:    IMPRESSION: JORDAN CHAKRABORTY  is a 68y Male PMHx glioblastoma s/p resection 01/2022, complicated by prolonged intubation, development of tracheal stenosis, s/p endoscopic balloon dilation with CT surgery at [facility] 03/02/2022, followed by trach 03/2022. Patient follows with ENT outpatient (Dr. Caba) for trach care/stenosis. Admitted for AMS; approximately 2 weeks prior patient decannulated himself. Evaluated by ENT 6/27/23 and elected to remain decannulated with family decision. ENT consulted while inpatient for evaluation of tracheostomy stoma.    Imaging from 7/1 reviewed (ordered by outside ENT): redemonstrated proximal tracheal stenosis with narrowest tracheal lumen 9x8mm; improved from prior CT imaging in 2022.     RECOMMENDATIONS:  -- Tracheostomy stoma appears closed and well healed  -- Per discusison family prefers to leave decannulated and follow up with outside ENT Dr. Caba as scheduled on 7/25/23 for discussion of possible dilation with CT surgery in future  -- Patient is breathing comfortably on room air without stridor at present moment.   -- Bedside laryngoscopy demonstrates no significant obstruction to level of glottis; however possible high tracheal vs. subglottic stenosis visualized, consistent with CT findings  -- Given presence of stenosis, should patient become symptomatic would recommend intubation with small caliber ETT (i.e. 6.5 mm)  -- No acute inpatient ENT intervention for stenosis; has outpatient followup planned and family would prefer to maintain current care with Dr. Caba  -- No ENT contraindication to anticoagulation in setting of well healed tracheostomy stoma  -- If primary team concerned about airway patency, can consider CT surgery consult for bronchoscopy vs. tracheostomy evaluation in setting of known tracheal stenosis    Thank you for the kind referral and for allowing me to share in the care of JORDAN CHAKRABORTY If you have any questions, please do not hesitate to contact me.   Case discussed and reviewed with attending of record.      Sincerely,  Arden Fitzpatrick MD PGY2  07-08-23 @ 14:40

## 2023-07-08 NOTE — PROGRESS NOTE ADULT - SUBJECTIVE AND OBJECTIVE BOX
HPI:  Osiel Norwood is a 67YO male with a past medical history of type 2 diabetes, hyperlipidemia, iron deficiency anemia, tracheal stenosis s/p tracheostomy (which was closed by ENT 7 days ago); glioblastoma s/p resection 1/27/2022, and Avastin treatment 3/2023 presenting with altered mental status and weakness x2 days. Per family, they last saw him last night around 6pm when he was at baseline. Patient has intermittent expressive aphasia but is getting worse and now is persistent, and has intermittent nausea and vomiting since last night. Patient was supposed to receive an outpatient brain MRI on 7/25/2023.  Stroke code performed in  is negative for CVA. Head CT shows new hyperdensity in frontal parietal - surgical site. MRI is ordered to rule out tumor recurrence.   Per daughter at bedside, Entresto and Eliquis were stopped per his PC - cardiologist.      (07 Jul 2023 13:10)    OVERNIGHT EVENTS: No acute events, neurologically stable    Hospital Course:   7/7: Admitted. Na 115 in ED with repeat 118, started 3% at 30cc/hr, and salt tabs 3q6, stability CTH in ED showing Interval development of a 13 mm hyperdense nodule along the   inferior margin of the resection cavity which may represent progression   of disease with hemorrhage, repeat CTH stable, urine studies ordered  7/8: Neuro stable, 12AM BMP, urine studies, pancx to r/o infection since pt is immunocompromised      Vital Signs Last 24 Hrs  T(C): 36.3 (07 Jul 2023 22:18), Max: 36.8 (07 Jul 2023 17:41)  T(F): 97.4 (07 Jul 2023 22:18), Max: 98.3 (07 Jul 2023 17:41)  HR: 65 (08 Jul 2023 00:00) (63 - 82)  BP: 128/77 (08 Jul 2023 00:00) (117/80 - 160/89)  BP(mean): 97 (08 Jul 2023 00:00) (97 - 109)  RR: 18 (08 Jul 2023 00:00) (16 - 18)  SpO2: 96% (08 Jul 2023 00:00) (96% - 100%)    Parameters below as of 08 Jul 2023 00:00  Patient On (Oxygen Delivery Method): room air        I&O's Detail    I&O's Summary      PHYSICAL EXAM:  Constitutional:  67 y/o male awake, alert in no acute distress.  Eyes:  Sclera anicteric, conjunctiva noninjected.  ENMT: Oropharyngeal mucosa moist, pink. Tongue midline.    Neck: Neck supple, FROM.  No appreciable lymphadenopathy.  Back:  No pain to palpation/percussion of low back. No CVA tenderness.  Respiratory: Clear to auscultation bilaterally.  No rales, rhonchi, wheezes.  Cardiovascular: Regular rate and rhythm.  S1, S2 heard.  Gastrointestinal:  Soft, nontender, nondistended.  +BS.  Genitourinary:  Deferred.  Rectal: Deferred.  Vascular: Extremities warm, no ulcers, no discoloration of skin.   Neurological: Gen: AA&O x 1 to self, conversant, appropriate.      CN II-XII grossly intact.    Motor: DUMONT x 4, b/l UE 5/5, b/l LE 3/5.    Sens: Sensation intact to light touch throughout.    DTRs: 2+ symmetric throughout.    Hoffmans negative bilaterally.  Plantar downgoing bilaterally.  No clonus.      No pronator drift, no dysmetria.  Skin: Warm, dry, no erythema.    TUBES/LINES:  [] CVC  [] A-line  [] Lumbar Drain  [] Ventriculostomy  [] CAREN  [] Campuzano  [] NGT   [] Other    DIET:  [] NPO  [x] Mechanical  [] Tube feeds    LABS:                        12.9   7.55  )-----------( 129      ( 07 Jul 2023 10:52 )             36.2     07-07    118<LL>  |  85<L>  |  5<L>  ----------------------------<  101<H>  4.3   |  23  |  0.59    Ca    8.3<L>      07 Jul 2023 16:12    TPro  7.3  /  Alb  4.6  /  TBili  0.9  /  DBili  x   /  AST  16  /  ALT  13  /  AlkPhos  70  07-07    PT/INR - ( 07 Jul 2023 10:52 )   PT: 13.1 sec;   INR: 1.10          PTT - ( 07 Jul 2023 10:52 )  PTT:25.8 sec  Urinalysis Basic - ( 07 Jul 2023 16:12 )    Color: x / Appearance: x / SG: x / pH: x  Gluc: 101 mg/dL / Ketone: x  / Bili: x / Urobili: x   Blood: x / Protein: x / Nitrite: x   Leuk Esterase: x / RBC: x / WBC x   Sq Epi: x / Non Sq Epi: x / Bacteria: x          CAPILLARY BLOOD GLUCOSE  127 (07 Jul 2023 19:28)      POCT Blood Glucose.: 105 mg/dL (07 Jul 2023 22:00)  POCT Blood Glucose.: 127 mg/dL (07 Jul 2023 10:30)      Drug Levels: [] N/A    CSF Analysis: [] N/A      Allergies    amoxicillin (Rash)    Intolerances        Home Medications:  aspirin 81 mg oral tablet: 1 tab(s) orally once a day (18 Apr 2023 13:40)  fluticasone 50 mcg/inh nasal spray: 1 spray(s) nasal 2 times a day (18 Apr 2023 13:38)  melatonin 3 mg oral tablet: 2 tab(s) orally once a day (at bedtime) (18 Apr 2023 13:38)  senna (sennosides) 8.6 mg oral tablet: 1 tab(s) orally once a day as needed for  constipation (18 Apr 2023 13:40)      MEDICATIONS:  Antibiotics:    Neuro:  acetaminophen     Tablet .. 650 milliGRAM(s) Oral every 6 hours PRN  escitalopram 5 milliGRAM(s) Oral daily  levETIRAcetam 750 milliGRAM(s) Oral two times a day  melatonin 5 milliGRAM(s) Oral at bedtime  ondansetron Injectable 4 milliGRAM(s) IV Push once PRN    Anticoagulation:    OTHER:  atorvastatin 20 milliGRAM(s) Oral at bedtime  chlorhexidine 2% Cloths 1 Application(s) Topical <User Schedule>  dexAMETHasone     Tablet 4 milliGRAM(s) Oral every 12 hours  fluticasone propionate 50 MICROgram(s)/spray Nasal Spray 1 Spray(s) Both Nostrils two times a day  glucagon  Injectable 1 milliGRAM(s) IntraMuscular once  insulin lispro (ADMELOG) corrective regimen sliding scale   SubCutaneous three times a day before meals  pantoprazole    Tablet 40 milliGRAM(s) Oral before breakfast  senna 2 Tablet(s) Oral at bedtime    IVF:  sodium chloride 3 Gram(s) Oral every 6 hours  sodium chloride 3%. 500 milliLiter(s) IV Continuous <Continuous>    CULTURES:    RADIOLOGY & ADDITIONAL TESTS:      ASSESSMENT:  68y Male s/p    ALTERED MENTAL STATUS;BRAIN MASS    Abnormal electrocardiogram [ECG] [EKG]    Allergy, unspecified, initial encounter    Anemia, unspecified    Benign prostatic hyperplasia without lower urinary tract symptoms    Calculus of kidney    Cough, unspecified    Depression, unspecified    ENCOUNTER FOR ATTENT    Encounter for general adult medical examination without abnormal findings    Encounter for immunization    Encounter for immunization safety counseling    Encounter for other preprocedural examination    Encounter for screening for malignant neoplasm of prostate    Encounter for screening for other metabolic disorders    Encounter for screening for other viral diseases    ESSENTIAL (PRIMARY)    Gastro-esophageal reflux disease without esophagitis    Generalized anxiety disorder    History of Glioblastoma    Hyperlipidemia, unspecified    Hypo-osmolality and hyponatremia    Hypotension, unspecified    Iron deficiency    Iron deficiency anemia, unspecified    LONG TERM (CURRENT)    LONG TERM (CURRENT)    LONG TERM (CURRENT)    Malignant neoplasm of brain, unspecified    Nausea with vomiting, unspecified    Other constipation    Other fatigue    Other injury of unspecified body region, initial encounter    OTHER SPECIFIED DISE    Personal history of malignant neoplasm, unspecified    Personal history of other diseases of the nervous system and sense organs    Personal history of other endocrine, nutritional and metabolic disease    Personal history of other mental and behavioral disorders    Personal history of transient ischemic attack (TIA), and cerebral infarction without residual deficits    POSTPROCEDURAL SUBGL    Pruritus, unspecified    Shortness of breath    Tachycardia, unspecified    Thrombocytopenia, unspecified    Unspecified abdominal pain    Unspecified visual disturbance    Urinary calculus, unspecified    Vitamin D deficiency, unspecified    No pertinent family history in first degree relatives    FH: diabetes mellitus (Father, Mother)    FH: hyperlipidemia (Father)    FH: hypertension (Father, Mother)    Handoff    MEWS Score    No pertinent past medical history    Diabetes mellitus    Hypertension    Glioblastoma    Type 2 diabetes mellitus    Hyperlipidemia    Iron deficiency anemia    Nephrolithiasis    Thrombocytopenia    Hyponatremia    BPH (benign prostatic hyperplasia)    No pertinent past medical history    Altered mental status    Hypertension    Glioblastoma    Type 2 diabetes mellitus    Hyperlipidemia    Iron deficiency anemia    Thrombocytopenia    BPH (benign prostatic hyperplasia)    Hyponatremia    No significant past surgical history    No significant past surgical history    S/P craniotomy    H/O tracheostomy    No significant past surgical history    MED EVAL    Type 2 diabetes mellitus    BPH (benign prostatic hyperplasia)    90+    Brain mass    [email or web address]    Osiel Norwood is a 67YO male with a past medical history of type 2 diabetes, hyperlipidemia, iron deficiency anemia, tracheal stenosis s/p tracheostomy (which was closed by ENT 7 days ago); glioblastoma s/p resection 1/27/2022, and Avastin treatment 3/2023 presenting with expressive aphasia, nuasea and vomiting x1 day. CT shows new hyperdensity in frontal parietal - surgical site c/f disease progression. Na in the . Admitted to NSICU for further management.     Plan:  Neuro:  -neuro/vitals q1  -CTH 7/7: Interval development of a 13 mm hyperdense nodule along the   inferior margin of the resection cavity which may represent progression   of disease with hemorrhage. Repeat CTH stable  -CTA 7/7: negative.  -pain control prn  -c/w home Keppra 750mg BID  -Decadron 4mg BID  -pending MR Brain  -c/w home Lexapro  -home ASA 81 held    Pulm:  -satting well on RA    Cardio:  -SBP<160    GI:  -CCD  -bowel regimen  -c/w protonix    Renal:  -hyponatremic, Na 118 on admission  -3% at 30cc/hour  -salt tabs 3q6  -voiding    Endo:  -ISS  -f/u A1c  -h/o T2DM: home Januvia held  -h/o HLD: c/w Atorvastatin 20mg    Heme:  -SCDs for DVT ppx  -pending LE dopplers    ID:  -afebrile  -pan cx 7/7    Dispo: ICU status, full code    D/w Dr. Dorado and Dr. Monsalve    DVT PROPHYLAXIS:  [x] Venodynes                                [x] Heparin/Lovenox    FALL RISK:  [] Low Risk                                    [] Impulsive      Assessment:  Present when checked    []  GCS  E   V  M     Heart Failure: []Acute, [] acute on chronic , []chronic  Heart Failure:  [] Diastolic (HFpEF), [] Systolic (HFrEF), []Combined (HFpEF and HFrEF), [] RHF, [] Pulm HTN, [] Other    [] EUGENIA, [] ATN, [] AIN, [] other  [] CKD1, [] CKD2, [] CKD 3, [] CKD 4, [] CKD 5, []ESRD    Encephalopathy: [] Metabolic, [] Hepatic, [] toxic, [] Neurological, [] Other    Abnormal Nurtitional Status: [] malnurtition (see nutrition note), [ ]underweight: BMI < 19, [] morbid obesity: BMI >40, [] Cachexia    [] Sepsis  [] hypovolemic shock,[] cardiogenic shock, [] hemorrhagic shock, [] neuogenic shock  [] Acute Respiratory Failure  []Cerebral edema, [] Brain compression/ herniation,   [] Functional quadriplegia  [] Acute blood loss anemia

## 2023-07-08 NOTE — PHYSICAL THERAPY INITIAL EVALUATION ADULT - THERAPY FREQUENCY, PT EVAL
Patient educated on frequency of inpatient physical therapy at [facility], patient verbalized understanding./2-3x/week

## 2023-07-08 NOTE — PROGRESS NOTE ADULT - SUBJECTIVE AND OBJECTIVE BOX
=================================  NEUROCRITICAL CARE ATTENDING NOTE  =================================    JORDAN CHAKRABORTY   MRN-[id number]  Summary:  68y/M  with type 2 diabetes, hyperlipidemia, iron deficiency anemia, tracheal stenosis s/p tracheostomy (which was closed by ENT 7 days ago); glioblastoma s/p resection 1/27/2022, and Avastin treatment 3/2023 presenting with altered mental status and weakness x2 days. Per family, they last saw him last night around 6pm when he was at baseline. Patient has intermittent expressive aphasia but is getting worse and now is persistent, and has intermittent nausea and vomiting since last night. Patient was supposed to receive an outpatient brain MRI on 7/25/2023. Stroke code performed in  is negative for CVA. Head CT shows new hyperdensity in frontal parietal - surgical site. MRI is ordered to rule out tumor recurrence.  Per daughter at bedside, Entresto and Eliquis were stopped per his PC - cardiologist.   (07 Jul 2023 13:10)    COURSE IN THE HOSPITAL:  07/07 Admitted to [facility], started on 3%  07/08 No significant events overnight.     Past Medical History: No pertinent past medical history diabetes mellitus Hypertension  Glioblastoma  Type 2 diabetes mellitus  Hyperlipidemia  Iron deficiency anemia  Nephrolithiasis  Thrombocytopenia  Hyponatremia  BPH (benign prostatic hyperplasia)  Allergies:  amoxicillin (Rash)  Home meds:   ·	aspirin 81 mg oral tablet: 1 tab(s) orally once a day  ·	atorvastatin 20 mg oral tablet: 1 tab(s) orally once a day (at bedtime)  ·	escitalopram 5 mg oral tablet: 1 tab(s) orally once a day  ·	fluticasone 50 mcg/inh nasal spray: 1 spray(s) nasal 2 times a day  ·	Januvia 100 mg oral tablet: 1 tab(s) orally once a day  ·	levETIRAcetam 750 mg oral tablet: 1 tab(s) orally 2 times a day  ·	melatonin 3 mg oral tablet: 2 tab(s) orally once a day (at bedtime)  ·	ondansetron 4 mg oral tablet: 1 tab(s) orally once a day  ·	pantoprazole 40 mg oral delayed release tablet: 1 tab(s) orally once a day (before a meal)  ·	senna (sennosides) 8.6 mg oral tablet: 1 tab(s) orally once a day as needed for  constipation  ·	sodium chloride 1 g oral tablet: 2 tab(s) orally every 6 hours    PHYSICAL EXAMINATION  T(C): 36.2 (07-08 @ 09:00), Max: 37.8 (07-08 @ 05:36) HR: 65 (07-08 @ 08:00) (58 - 82) BP: 123/67 (07-08 @ 08:00) (108/64 - 160/89) RR: 18 (07-08 @ 08:00) (16 - 18) SpO2: 97% (07-08 @ 08:00) (96% - 100%)  NEUROLOGIC EXAMINATION:  Patient is    GENERAL: not intubated, not in cardiorespiratory distress  EENT:  anicteric  CARDIOVASCULAR: (+) S1 S2, normal rate and regular rhythm  PULMONARY: clear to auscultation bilaterally  ABDOMEN: soft, nontender with normoactive bowel sounds  EXTREMITIES: no edema  SKIN: no rash    LABS:  CAPILLARY BLOOD GLUCOSE 119 105 127                12.8   3.58  )-----------( 73       ( 08 Jul 2023 05:22 )             36.3   (from 114, 118)  117<LL>  |  84<L>  |  6<L>  ----------------------------<  142<H>  4.5   |  23  |  0.64    Ca    9.2      08 Jul 2023 05:22  Phos  4.0     07-08  Mg     1.7     07-08    TPro  7.3  /  Alb  4.6  /  TBili  0.9  /  DBili  x   /  AST  16  /  ALT  13  /  AlkPhos  70  07-07 07-07 @ 07:01  -  07-08 @ 07:00  IN: 450 mL / OUT: 1100 mL / NET: -650 mL    Bacteriology:  CSF studies:  EEG:  Neuroimaging:  Other imaging:    MEDICATIONS: 07-08    ·	escitalopram 5 Oral daily  ·	levETIRAcetam 750 Oral two times a day  ·	melatonin 5 Oral at bedtime  ·	pantoprazole    Tablet 40 Oral before breakfast  ·	senna 2 Oral at bedtime  ·	atorvastatin 20 Oral at bedtime  ·	dexAMETHasone     Tablet 4 Oral every 12 hours  ·	insulin lispro (ADMELOG) corrective regimen sliding scale  SubCutaneous three times a day before meals  ·	fluticasone propionate 50 MICROgram(s)/spray Nasal Spray 1 Both Nostrils two times a day  ·	magnesium sulfate  IVPB 2 IV Intermittent once  ·	sodium chloride 3 Oral every 6 hours  ·	acetaminophen     Tablet .. 650 Oral every 6 hours PRN  ·	ondansetron Injectable 4 IV Push once PRN    IV FLUIDS:  DRIPS:  DIET:  Lines:  Drains:    Wounds:    CODE STATUS:  Full Code                       GOALS OF CARE:  aggressive                      DISPOSITION:  ICU =================================  NEUROCRITICAL CARE ATTENDING NOTE  =================================    JORDAN CHAKRABORTY   MRN-[id number]  Summary:  68y/M  with type 2 diabetes, hyperlipidemia, iron deficiency anemia, tracheal stenosis s/p tracheostomy (which was closed by ENT 7 days ago); glioblastoma s/p resection 1/27/2022, and Avastin treatment 3/2023 presenting with altered mental status and weakness x2 days. Per family, they last saw him last night around 6pm when he was at baseline. Patient has intermittent expressive aphasia but is getting worse and now is persistent, and has intermittent nausea and vomiting since last night. Patient was supposed to receive an outpatient brain MRI on 7/25/2023. Stroke code performed in  is negative for CVA. Head CT shows new hyperdensity in frontal parietal - surgical site. MRI is ordered to rule out tumor recurrence.  Per daughter at bedside, Entresto and Eliquis were stopped per his PC - cardiologist.   (07 Jul 2023 13:10)    COURSE IN THE HOSPITAL:  07/07 Admitted to [facility], started on 3%  07/08 37.8 pancultured No significant events overnight.     Past Medical History: No pertinent past medical history diabetes mellitus Hypertension  Glioblastoma  Type 2 diabetes mellitus  Hyperlipidemia  Iron deficiency anemia  Nephrolithiasis  Thrombocytopenia  Hyponatremia  BPH (benign prostatic hyperplasia)  Allergies:  amoxicillin (Rash)  Home meds:   ·	aspirin 81 mg oral tablet: 1 tab(s) orally once a day  ·	atorvastatin 20 mg oral tablet: 1 tab(s) orally once a day (at bedtime)  ·	escitalopram 5 mg oral tablet: 1 tab(s) orally once a day  ·	fluticasone 50 mcg/inh nasal spray: 1 spray(s) nasal 2 times a day  ·	Januvia 100 mg oral tablet: 1 tab(s) orally once a day  ·	levETIRAcetam 750 mg oral tablet: 1 tab(s) orally 2 times a day  ·	melatonin 3 mg oral tablet: 2 tab(s) orally once a day (at bedtime)  ·	ondansetron 4 mg oral tablet: 1 tab(s) orally once a day  ·	pantoprazole 40 mg oral delayed release tablet: 1 tab(s) orally once a day (before a meal)  ·	senna (sennosides) 8.6 mg oral tablet: 1 tab(s) orally once a day as needed for  constipation  ·	sodium chloride 1 g oral tablet: 2 tab(s) orally every 6 hours    PHYSICAL EXAMINATION  T(C): 36.2 (07-08 @ 09:00), Max: 37.8 (07-08 @ 05:36) HR: 65 (07-08 @ 08:00) (58 - 82) BP: 123/67 (07-08 @ 08:00) (108/64 - 160/89) RR: 18 (07-08 @ 08:00) (16 - 18) SpO2: 97% (07-08 @ 08:00) (96% - 100%)  NEUROLOGIC EXAMINATION:  Patient is  awake alert orientation x2, JUAN, no facial droop, moving all 4s with good strength  GENERAL: not intubated, not in cardiorespiratory distress  EENT:  anicteric  CARDIOVASCULAR: (+) S1 S2, normal rate and regular rhythm  PULMONARY: clear to auscultation bilaterally  ABDOMEN: soft, nontender with normoactive bowel sounds  EXTREMITIES: no edema  SKIN: no rash    LABS:  CAPILLARY BLOOD GLUCOSE 119 105 127                12.8   3.58  )-----------( 73       ( 08 Jul 2023 05:22 )             36.3   (from 114, 118)  117<LL>  |  84<L>  |  6<L>  ----------------------------<  142<H>  4.5   |  23  |  0.64    Ca    9.2      08 Jul 2023 05:22  Phos  4.0     07-08  Mg     1.7     07-08    TPro  7.3  /  Alb  4.6  /  TBili  0.9  /  DBili  x   /  AST  16  /  ALT  13  /  AlkPhos  70  07-07 07-07 @ 07:01  -  07-08 @ 07:00  IN: 450 mL / OUT: 1100 mL / NET: -650 mL    Bacteriology:  CSF studies:  EEG:  Neuroimaging:  Other imaging:    MEDICATIONS: 07-08    ·	escitalopram 5 Oral daily  ·	levETIRAcetam 750 Oral two times a day  ·	melatonin 5 Oral at bedtime  ·	pantoprazole    Tablet 40 Oral before breakfast  ·	senna 2 Oral at bedtime  ·	atorvastatin 20 Oral at bedtime  ·	dexAMETHasone     Tablet 4 Oral every 12 hours  ·	insulin lispro (ADMELOG) corrective regimen sliding scale  SubCutaneous three times a day before meals  ·	fluticasone propionate 50 MICROgram(s)/spray Nasal Spray 1 Both Nostrils two times a day  ·	sodium chloride 3 Oral every 6 hours  ·	acetaminophen     Tablet .. 650 Oral every 6 hours PRN  ·	ondansetron Injectable 4 IV Push once PRN    IV FLUIDS: 3%@50cc/hr  DRIPS:  DIET: CCD  Lines:  Drains:    Wounds:    CODE STATUS:  Full Code                       GOALS OF CARE:  aggressive                      DISPOSITION:  ICU

## 2023-07-08 NOTE — PROGRESS NOTE ADULT - ASSESSMENT
68y/M with  No pertinent past medical history    Diabetes mellitus    Hypertension    Glioblastoma    Type 2 diabetes mellitus    Hyperlipidemia    Iron deficiency anemia    Nephrolithiasis    Thrombocytopenia    Hyponatremia    BPH (benign prostatic hyperplasia)        PLAN:   NEURO:  REHAB:  physical therapy evaluation and management    EARLY MOB:      PULM:  Room air, incentive spirometry  CARDIO:  SBP goal 100-150mm Hg  ENDO:  Blood sugar goals 140-180 mg/dL, continue insulin sliding scale  GI:  PPI for GI prophylaxis  DIET:  RENAL:    HEM/ONC:  VTE Prophylaxis:     ID: afebrile, no leukocytosis  ====================   T(F): , Max: 100 (07-08-23 @ 05:36)    WBC Count: 3.58 (07-08)  WBC Count: 7.55 (07-07)    ====================  Social: will update family    Active issues:  What's keeping patient in the ICU?  What is this patient's dispo plan?    ATTENDING ATTESTATION:  I was physically present for the key portions of the evaluation and management (E/M) service provided.  I agree with the above history, physical and plan, which I have reviewed and edited where appropriate.    Patient at high risk for neurological deterioration or death due to:  ICU delirium, aspiration PNA, DVT / PE.  Critical care time:  I have personally provided 60 minutes of critical care time, excluding time spent on separate procedures.      Plan discussed with RN, house staff. 68y/M with  severe hyponatremia  GBM brain compression cerebral edema   Diabetes Mellitus Hypertension dyslipidemia  iron deficiency anemia  nephrolithiasis  thrombocytopenia  BPH    PLAN:   NEURO: neurochecks q1h, PRN pain meds with acetaminophen  increase Na slowly   GBM - no neurosurgical plans this admission  continue steroids  REHAB:  physical therapy evaluation and management    EARLY MOB:   OOB to chair, ambulate    PULM:  Room air, incentive spirometry  CARDIO:  SBP goal 100-150mm Hg  ENDO:  Blood sugar goals 140-180 mg/dL, continue insulin sliding scale  GI:  switch PPI to sucralfate  DIET: CCD add ensure if not eating well  RENAL:  3% @50cc/hr, rechecking BMP now, salt tabs, Na goal 127 or less (8-10/day) in next 24h  HEM/ONC: Hb stable  VTE Prophylaxis: SCDs, no DVT chemoprophylaxis for now as patient is high risk for bleed (thrombocytopenia)  ID: febrile, no leukocytosis, f/u microbiologic work-up  Social: daughter and son-in-law at bedside - updated    Active issues:  What's keeping patient in the ICU? severe hyponatremia  What is this patient's dispo plan? stepdown once Na >125    ATTENDING ATTESTATION:  I was physically present for the key portions of the evaluation and management (E/M) service provided.  I agree with the above history, physical and plan, which I have reviewed and edited where appropriate.    Patient at high risk for neurological deterioration or death due to:  ICU delirium, aspiration PNA, DVT / PE.  Critical care time:  I have personally provided 60 minutes of critical care time, excluding time spent on separate procedures.      Plan discussed with RN, house staff.

## 2023-07-08 NOTE — PHYSICAL THERAPY INITIAL EVALUATION ADULT - PERTINENT HX OF CURRENT PROBLEM, REHAB EVAL
67YO male with a past medical history of type 2 diabetes, hyperlipidemia, iron deficiency anemia, tracheal stenosis s/p tracheostomy (which was closed by ENT 7 days ago); glioblastoma s/p resection 1/27/2022, and Avastin treatment 3/2023 presenting with expressive aphasia, nuasea and vomiting x1 day. CT shows new hyperdensity in frontal parietal - surgical site c/f disease progression. Na in the . Admitted to NSICU for further management.

## 2023-07-08 NOTE — CHART NOTE - NSCHARTNOTEFT_GEN_A_CORE
Neuro stable, 12AM BMP Na114 3% increased to 40cc/hr, urine studies, pancx to r/o infection since pt is immunocompromised. Changed pantoprazole to sucralfate for GI ppx d/t thrombocytopenia. LE dopplers negative. DC'd 3%. ENT consulted to assess stoma, recommend follow up upon discharge with ENT, Repeat sodium 122. Restarted 3% at 30.

## 2023-07-09 NOTE — PATIENT PROFILE ADULT - CENTRAL VENOUS CATHETER/PICC LINE
Department of Anesthesiology  Postprocedure Note    Patient: Sergio Olivarez  MRN: [id number]  YOB: 1936  Date of evaluation: 6/21/2023      Procedure Summary     Date: 06/21/23 Room / Location: [facility] 02 / [facility] ENDOSCOPY    Anesthesia Start: 1303 Anesthesia Stop: 1332    Procedure: EGD ESOPHAGOGASTRODUODENOSCOPY Diagnosis:       Anemia, unspecified type      (Anemia, unspecified type [D64.9])    Surgeons: Davin Christian MD Responsible Provider: Tavo Peña MD    Anesthesia Type: MAC, TIVA ASA Status: 3 - Emergent          Anesthesia Type: MAC, TIVA    Renetta Phase I:      Renetta Phase II:        Anesthesia Post Evaluation    Patient location during evaluation: bedside (Endoscopy Unit)  Patient participation: complete - patient participated  Level of consciousness: sleepy but conscious  Pain score: 0  Airway patency: patent  Nausea & Vomiting: no nausea and no vomiting  Complications: no  Cardiovascular status: hemodynamically stable  Respiratory status: acceptable  Hydration status: stable  Comments: This patient remained on the stretcher. The patient was handed off to the endoscopy nursing team.  All questions regarding pre-, intra-, and postoperative care were answered.   Multimodal analgesia pain management approach no

## 2023-07-09 NOTE — DIETITIAN INITIAL EVALUATION ADULT - OTHER CALCULATIONS
Based on Standards of Care pt within % IBW (108%) thus actual body weight used for all calculations (160#). Needs adjusted for advanced age and malnutrition. Fluid recs per team.  Based on Standards of Care pt within % IBW (108%) thus actual body weight used for all calculations (160#). Needs adjusted for advanced age and malnutrition. Fluid recs per team in view of hyponatremia.

## 2023-07-09 NOTE — DIETITIAN NUTRITION RISK NOTIFICATION - ADDITIONAL COMMENTS/DIETITIAN RECOMMENDATIONS
1. Continue with diet order, add Glucerna 1x/day (220 kcal,10 g protein per serving)   2. Encourage pt to meet nutritional needs as able    Pt educated on adequate PO intake, supplement between meals

## 2023-07-09 NOTE — PATIENT PROFILE ADULT - FALL HARM RISK - HARM RISK INTERVENTIONS

## 2023-07-09 NOTE — PROGRESS NOTE ADULT - SUBJECTIVE AND OBJECTIVE BOX
PM EVENTS: no acute overnight events as of documentation time of this note.    ROS: negative except as mentioned above.    Vital Signs Last 24 Hrs  T(C): 36.4 (09 Jul 2023 18:29), Max: 36.7 (09 Jul 2023 00:27)  T(F): 97.5 (09 Jul 2023 18:29), Max: 98.1 (09 Jul 2023 05:42)  HR: 66 (09 Jul 2023 19:00) (58 - 80)  BP: 117/64 (09 Jul 2023 19:00) (113/67 - 153/78)  BP(mean): 86 (09 Jul 2023 19:00) (81 - 109)  RR: 14 (09 Jul 2023 19:00) (14 - 20)  SpO2: 94% (09 Jul 2023 19:00) (94% - 98%)    Parameters below as of 09 Jul 2023 19:00  Patient On (Oxygen Delivery Method): room air      I&O's Detail    08 Jul 2023 07:01  -  09 Jul 2023 07:00  --------------------------------------------------------  IN:    IV PiggyBack: 50 mL    Oral Fluid: 720 mL    sodium chloride 3%: 300 mL    sodium chloride 3%: 240 mL  Total IN: 1310 mL    OUT:    Incontinent per Condom Catheter (mL): 350 mL    Voided (mL): 2525 mL  Total OUT: 2875 mL    Total NET: -1565 mL      09 Jul 2023 07:01  -  09 Jul 2023 19:19  --------------------------------------------------------  IN:    IV PiggyBack: 100 mL    Oral Fluid: 1680 mL    sodium chloride 3%: 120 mL    sodium chloride 3%: 300 mL    sodium chloride 3%: 225 mL  Total IN: 2425 mL    OUT:    Incontinent per Condom Catheter (mL): 1150 mL  Total OUT: 1150 mL    Total NET: 1275 mL                                11.5   6.80  )-----------( 69       ( 09 Jul 2023 05:30 )             32.6     07-09    121<L>  |  87<L>  |  7   ----------------------------<  165<H>  3.8   |  21<L>  |  0.51    Ca    8.1<L>      09 Jul 2023 14:37  Phos  2.9     07-09  Mg     1.5     07-09        MEDICATIONS  (STANDING):  atorvastatin 20 milliGRAM(s) Oral at bedtime  chlorhexidine 2% Cloths 1 Application(s) Topical <User Schedule>  dexAMETHasone     Tablet 4 milliGRAM(s) Oral every 12 hours  escitalopram 5 milliGRAM(s) Oral daily  fludroCORTISONE 0.2 milliGRAM(s) Oral daily  fluticasone propionate 50 MICROgram(s)/spray Nasal Spray 1 Spray(s) Both Nostrils two times a day  glucagon  Injectable 1 milliGRAM(s) IntraMuscular once  insulin lispro (ADMELOG) corrective regimen sliding scale   SubCutaneous three times a day before meals  levETIRAcetam 750 milliGRAM(s) Oral two times a day  melatonin 5 milliGRAM(s) Oral at bedtime  senna 2 Tablet(s) Oral at bedtime  sodium chloride 3 Gram(s) Oral every 6 hours  sodium chloride 3%. 500 milliLiter(s) (75 mL/Hr) IV Continuous <Continuous>  sucralfate 1 Gram(s) Oral every 6 hours    MEDICATIONS  (PRN):  acetaminophen     Tablet .. 650 milliGRAM(s) Oral every 6 hours PRN Temp greater or equal to 38C (100.4F), Mild Pain (1 - 3)  ondansetron Injectable 4 milliGRAM(s) IV Push once PRN Nausea and/or Vomiting        EXAM:       Skylar Coma Scale: 4/4/6    General: normocephalic, atraumatic, laying in bed, in no distress  Neuro     MS: alert, oriented x0, cooperative, intact attention, no neglect, intact comprehension, occasional paraphasic errors, intact fluency    CN: PERRL, EOMI and no ptosis bilaterally, face symmetric, hearing grossly intact    Mot: bulk normal, tone normal, power UPPER (R/L) 5/5, LOWER (R/L) 5/5  Chest: nonlabored respirations, no adventitious lung sounds bilaterally, heart regular rate/rhythm, present S1/S2, no murmurs or rubs  Abdomen: nondistended, soft and nontender without peritoneal signs, normoactive bowel sounds  Extremities: no clubbing, well-perfused, no edema      Please see the day's note documented by Dr. Monsalve for detailed ongoing assessment and plan.  Additions to plan are as follows:    Frequent neurovitals  3% for hyponatremia < 8-12mEq/L correction per day, salt tabs, no neurosurgical plans during this admission, Dex  SBP < 150  Home LEV

## 2023-07-09 NOTE — PROGRESS NOTE ADULT - SUBJECTIVE AND OBJECTIVE BOX
=================================  NEUROCRITICAL CARE ATTENDING NOTE  =================================    JORDAN CHAKRABORTY   MRN-[id number]  Summary:  68y/M  with type 2 diabetes, hyperlipidemia, iron deficiency anemia, tracheal stenosis s/p tracheostomy (which was closed by ENT 7 days ago); glioblastoma s/p resection 1/27/2022, and Avastin treatment 3/2023 presenting with altered mental status and weakness x2 days. Per family, they last saw him last night around 6pm when he was at baseline. Patient has intermittent expressive aphasia but is getting worse and now is persistent, and has intermittent nausea and vomiting since last night. Patient was supposed to receive an outpatient brain MRI on 7/25/2023. Stroke code performed in  is negative for CVA. Head CT shows new hyperdensity in frontal parietal - surgical site. MRI is ordered to rule out tumor recurrence.  Per daughter at bedside, Entresto and Eliquis were stopped per his PC - cardiologist.   (07 Jul 2023 13:10)    COURSE IN THE HOSPITAL:  07/07 Admitted to [facility], started on 3%  07/08 37.8 pancultured No significant events overnight.   07/09 No significant events overnight.     Past Medical History: No pertinent past medical history diabetes mellitus Hypertension  Glioblastoma  Type 2 diabetes mellitus  Hyperlipidemia  Iron deficiency anemia  Nephrolithiasis  Thrombocytopenia  Hyponatremia  BPH (benign prostatic hyperplasia)  Allergies:  amoxicillin (Rash)  Home meds:   ·	aspirin 81 mg oral tablet: 1 tab(s) orally once a day  ·	atorvastatin 20 mg oral tablet: 1 tab(s) orally once a day (at bedtime)  ·	escitalopram 5 mg oral tablet: 1 tab(s) orally once a day  ·	fluticasone 50 mcg/inh nasal spray: 1 spray(s) nasal 2 times a day  ·	Januvia 100 mg oral tablet: 1 tab(s) orally once a day  ·	levETIRAcetam 750 mg oral tablet: 1 tab(s) orally 2 times a day  ·	melatonin 3 mg oral tablet: 2 tab(s) orally once a day (at bedtime)  ·	ondansetron 4 mg oral tablet: 1 tab(s) orally once a day  ·	pantoprazole 40 mg oral delayed release tablet: 1 tab(s) orally once a day (before a meal)  ·	senna (sennosides) 8.6 mg oral tablet: 1 tab(s) orally once a day as needed for  constipation  ·	sodium chloride 1 g oral tablet: 2 tab(s) orally every 6 hours    PHYSICAL EXAMINATION  T(C): 36.3 (07-09 @ 09:00), Max: 36.7 (07-09 @ 00:27) HR: 65 (07-09 @ 11:00) (58 - 80) BP: 140/63 (07-09 @ 11:00) (114/65 - 141/81) RR: 19 (07-09 @ 11:00) (14 - 20) SpO2: 97% (07-09 @ 11:00) (95% - 98%)  NEUROLOGIC EXAMINATION:  Patient is  awake alert orientation x2, JUAN, no facial droop, moving all 4s with good strength  GENERAL: not intubated, not in cardiorespiratory distress  EENT:  anicteric  CARDIOVASCULAR: (+) S1 S2, normal rate and regular rhythm  PULMONARY: clear to auscultation bilaterally  ABDOMEN: soft, nontender with normoactive bowel sounds  EXTREMITIES: no edema  SKIN: no rash    LABS: 07-09  CAPILLARY BLOOD GLUCOSE 159 127 224 - 6 units coverage insulin lispro              (3.58)  11.5 (12.8)   6.80  )-----------( 69  (73)      ( 09 Jul 2023 05:30 )             32.6   (123)  122<L>  |  90<L>  |  10  ----------------------------<  156<H>  4.0   |  23  |  0.56    Ca    8.3<L>      09 Jul 2023 05:30  Phos  2.9     07-09  Mg     1.5     07-09 07-08 @ 07:01  -  07-09 @ 07:00  IN: 1310 mL / OUT: 2875 mL / NET: -1565 mL    Bacteriology:  CSF studies:  EEG:  Neuroimaging:  Other imaging:    MEDICATIONS: 07-09    ·	escitalopram 5 Oral daily  ·	levETIRAcetam 750 Oral two times a day  ·	melatonin 5 Oral at bedtime  ·	senna 2 Oral at bedtime  ·	sucralfate 1 Oral every 6 hours  ·	atorvastatin 20 Oral at bedtime  ·	dexAMETHasone     Tablet 4 Oral every 12 hours  ·	fluticasone propionate 50 MICROgram(s)/spray Nasal Spray 1 Both Nostrils two times a day  ·	sodium chloride 3 Oral every 6 hours  ·	acetaminophen     Tablet .. 650 Oral every 6 hours PRN  ·	ondansetron Injectable 4 IV Push once PRN    IV FLUIDS: 3%@30cc/hr  DRIPS:  DIET: CCD  Lines:  Drains:    Wounds:    CODE STATUS:  Full Code                       GOALS OF CARE:  aggressive                      DISPOSITION:  ICU

## 2023-07-09 NOTE — DIETITIAN INITIAL EVALUATION ADULT - ADD RECOMMEND
1. Continue with diet order, add Glucerna 1x/day (220 kcal,10 g protein per serving)   2. Encourage pt to meet nutritional needs as able   3. Monitor labs: electrolytes, cmp, finger stick   4. Monitor weights   5. Pain and bowel regimen per team   6. Will continue to assess/honor food preferences as able  7. Monitor adherence to diet education   *discussed with team

## 2023-07-09 NOTE — DIETITIAN INITIAL EVALUATION ADULT - OTHER INFO
67YO male with a past medical history of type 2 diabetes, hyperlipidemia, iron deficiency anemia, tracheal stenosis s/p tracheostomy (which was closed by ENT 7 days ago); glioblastoma s/p resection 1/27/2022, and Avastin treatment 3/2023 presenting with altered mental status and weakness x2 days. Per family, they last saw him last night around 6pm when he was at baseline. Patient has intermittent expressive aphasia but is getting worse and now is persistent, and has intermittent nausea and vomiting since last night.    Patient seen at bedside for ICU length of stay assessment.  used, ID# [id number]. Current diet order: CSTCHO. NKFA. No difficulty chewing/swallowing reported. Reports good appetite, pt observed feeding himself during assessment, consumed 100% of breakfast which included cheerios, hard boiled eggs, potatoes. PTA, pt reports poor appetite x 1 month r/t "not feeling well" however appetite is now improving. Discussed importance of adequate PO intake. Food preferences reviewed: fish, ice cream. Pt amenable to Glucerna 1x/day to optimize PO intake, educated pt on drinking supplement between meals. Dosing weight: 160#, BMI 25.1, reports UBW of 160# and denies recent weight loss. Pt endorses weight loss of 50# x 1 year ago, but has since gained weight and has been trending around 160# x 6 months. No pressure injuries or edema documented. Denies current N/V/D/C, last BM 7/8 per EMR. Labs: Elevated BG, Low Na, Low Mg. Meds: IVF, insulin, zofran, senna, sodium chloride. Observed with mild muscle wasting to temples and clavicles. Based on ASPEN guidelines, pt meets criteria for moderate malnutrition. See nutrition recommendations below.  69YO male with a past medical history of type 2 diabetes, hyperlipidemia, iron deficiency anemia, tracheal stenosis s/p tracheostomy (which was closed by ENT 7 days ago); glioblastoma s/p resection 1/27/2022, and Avastin treatment 3/2023 presenting with altered mental status and weakness x2 days. Per family, they last saw him last night around 6pm when he was at baseline. Patient has intermittent expressive aphasia but is getting worse and now is persistent, and has intermittent nausea and vomiting since last night.    Patient seen at bedside for ICU length of stay assessment.  used, ID# [id number]. Current diet order: CSTCHO. NKFA. No difficulty chewing/swallowing reported. Reports good appetite, pt observed feeding himself during assessment, consumed 100% of breakfast which included cheerios, hard boiled eggs, potatoes. PTA, pt reports poor appetite x 1 month r/t "not feeling well" however appetite is now improving. Discussed importance of adequate PO intake. Food preferences reviewed: fish, ice cream. Pt amenable to Glucerna 1x/day to optimize PO intake, educated pt on drinking supplement between meals. Dosing weight: 160#, BMI 25.1, reports UBW of 160# and denies recent weight loss. Pt endorses 50# weight loss from 175# to 125# x 1 year ago, but has since gained weight and has been trending around 160# x 6 months. No pressure injuries or edema documented. Denies current N/V/D/C, last BM 7/8 per EMR. Labs: Elevated glucose, Low Na, Low Mg. Meds: IVF, insulin, zofran, senna, sodium chloride. Observed with mild muscle wasting to temples and clavicles. Based on ASPEN guidelines, pt meets criteria for moderate malnutrition. See nutrition recommendations below.

## 2023-07-09 NOTE — PROGRESS NOTE ADULT - SUBJECTIVE AND OBJECTIVE BOX
HPI:  Osiel Norwood is a 67YO male with a past medical history of type 2 diabetes, hyperlipidemia, iron deficiency anemia, tracheal stenosis s/p tracheostomy (which was closed by ENT 7 days ago); glioblastoma s/p resection 1/27/2022, and Avastin treatment 3/2023 presenting with altered mental status and weakness x2 days. Per family, they last saw him last night around 6pm when he was at baseline. Patient has intermittent expressive aphasia but is getting worse and now is persistent, and has intermittent nausea and vomiting since last night. Patient was supposed to receive an outpatient brain MRI on 7/25/2023.  Stroke code performed in  is negative for CVA. Head CT shows new hyperdensity in frontal parietal - surgical site. MRI is ordered to rule out tumor recurrence.   Per daughter at bedside, Entresto and Eliquis were stopped per his PC - cardiologist.      (07 Jul 2023 13:10)    OVERNIGHT EVENTS: ANA LILIA o/n neuro stable    Hospital Course:   7/7: Admitted. Na 115 in ED with repeat 118, started 3% at 30cc/hr, and salt tabs 3q6, stability CTH in ED showing Interval development of a 13 mm hyperdense nodule along the   inferior margin of the resection cavity which may represent progression   of disease with hemorrhage, repeat CTH stable, urine studies ordered  7/8: Neuro stable, 12AM BMP Na114 3% increased to 40cc/hr, urine studies, pancx to r/o infection since pt is immunocompromised. Changed pantoprazole to sucralfate for GI ppx d/t thrombocytopenia. LE dopplers negative. DC'd 3%. ENT consulted to assess stoma, recommend follow up upon discharge with ENT, Repeat sodium 122. Restarted 3% at 30.  7/9: ANA LILIA o/n neuro stable. remains on 3%@30cc/hr.    Vital Signs Last 24 Hrs  T(C): 36.7 (09 Jul 2023 00:27), Max: 37.8 (08 Jul 2023 05:36)  T(F): 98 (09 Jul 2023 00:27), Max: 100 (08 Jul 2023 05:36)  HR: 64 (09 Jul 2023 01:00) (58 - 80)  BP: 118/65 (09 Jul 2023 01:00) (108/64 - 141/81)  BP(mean): 85 (09 Jul 2023 01:00) (82 - 106)  RR: 18 (09 Jul 2023 01:00) (14 - 18)  SpO2: 97% (09 Jul 2023 01:00) (95% - 98%)    Parameters below as of 09 Jul 2023 01:00  Patient On (Oxygen Delivery Method): room air        I&O's Summary    07 Jul 2023 07:01  -  08 Jul 2023 07:00  --------------------------------------------------------  IN: 450 mL / OUT: 1100 mL / NET: -650 mL    08 Jul 2023 07:01  -  09 Jul 2023 01:49  --------------------------------------------------------  IN: 1220 mL / OUT: 1725 mL / NET: -505 mL        PHYSICAL EXAM:  GEN: laying in bed, NAD  NEURO: AOx2 (self/place). FC, OE spont, speech intact, face symmetric. CNII-XII intact. PERRL, EOMI. No pronator drift. MAEx4. 5/5 strength throughout. SILT  CV: RRR +S1/S2  PULM: CTAB  GI: Abd soft, NT/ND  EXT: ext warm, dry, nontender    TUBES/LINES:  [] Campuzano  [] Lumbar Drain  [] Wound Drains  [] Others      DIET:  [] NPO  [x] Mechanical  [] Tube feeds    LABS:                        12.8   3.58  )-----------( 73       ( 08 Jul 2023 05:22 )             36.3     07-08    123<L>  |  90<L>  |  11  ----------------------------<  177<H>  4.5   |  21<L>  |  0.58    Ca    8.8      08 Jul 2023 20:13  Phos  4.0     07-08  Mg     1.7     07-08    TPro  7.3  /  Alb  4.6  /  TBili  0.9  /  DBili  x   /  AST  16  /  ALT  13  /  AlkPhos  70  07-07    PT/INR - ( 07 Jul 2023 10:52 )   PT: 13.1 sec;   INR: 1.10          PTT - ( 07 Jul 2023 10:52 )  PTT:25.8 sec  Urinalysis Basic - ( 08 Jul 2023 20:13 )    Color: x / Appearance: x / SG: x / pH: x  Gluc: 177 mg/dL / Ketone: x  / Bili: x / Urobili: x   Blood: x / Protein: x / Nitrite: x   Leuk Esterase: x / RBC: x / WBC x   Sq Epi: x / Non Sq Epi: x / Bacteria: x          CAPILLARY BLOOD GLUCOSE      POCT Blood Glucose.: 127 mg/dL (08 Jul 2023 17:27)  POCT Blood Glucose.: 224 mg/dL (08 Jul 2023 11:27)  POCT Blood Glucose.: 119 mg/dL (08 Jul 2023 06:27)      Drug Levels: [] N/A    CSF Analysis: [] N/A      Allergies    amoxicillin (Rash)    Intolerances      MEDICATIONS:  Antibiotics:    Neuro:  acetaminophen     Tablet .. 650 milliGRAM(s) Oral every 6 hours PRN  escitalopram 5 milliGRAM(s) Oral daily  levETIRAcetam 750 milliGRAM(s) Oral two times a day  melatonin 5 milliGRAM(s) Oral at bedtime  ondansetron Injectable 4 milliGRAM(s) IV Push once PRN    Anticoagulation:    OTHER:  atorvastatin 20 milliGRAM(s) Oral at bedtime  chlorhexidine 2% Cloths 1 Application(s) Topical <User Schedule>  dexAMETHasone     Tablet 4 milliGRAM(s) Oral every 12 hours  fluticasone propionate 50 MICROgram(s)/spray Nasal Spray 1 Spray(s) Both Nostrils two times a day  glucagon  Injectable 1 milliGRAM(s) IntraMuscular once  insulin lispro (ADMELOG) corrective regimen sliding scale   SubCutaneous three times a day before meals  senna 2 Tablet(s) Oral at bedtime  sucralfate 1 Gram(s) Oral every 6 hours    IVF:  sodium chloride 3 Gram(s) Oral every 6 hours  sodium chloride 3%. 500 milliLiter(s) IV Continuous <Continuous>    CULTURES:  Culture Results:   No growth at 12 hours (07-07 @ 23:47)  Culture Results:   No growth at 12 hours (07-07 @ 23:47)    RADIOLOGY & ADDITIONAL TESTS:      ASSESSMENT:  Pt is a 67YO male with a past medical history of type 2 diabetes, hyperlipidemia, iron deficiency anemia, tracheal stenosis s/p tracheostomy (which was closed by ENT 7 days ago); glioblastoma s/p resection 1/27/2022, and Avastin treatment 3/2023 presenting with expressive aphasia, nuasea and vomiting x1 day. CT shows new hyperdensity in left frontal parietal - surgical site c/f disease progression. Na in the . Admitted to NSICU for further management.     ALTERED MENTAL STATUS;BRAIN MASS    Abnormal electrocardiogram [ECG] [EKG]    Allergy, unspecified, initial encounter    Anemia, unspecified    Benign prostatic hyperplasia without lower urinary tract symptoms    Calculus of kidney    Cough, unspecified    Depression, unspecified    ENCOUNTER FOR ATTENT    Encounter for general adult medical examination without abnormal findings    Encounter for immunization    Encounter for immunization safety counseling    Encounter for other preprocedural examination    Encounter for screening for malignant neoplasm of prostate    Encounter for screening for other metabolic disorders    Encounter for screening for other viral diseases    ESSENTIAL (PRIMARY)    Gastro-esophageal reflux disease without esophagitis    Generalized anxiety disorder    History of Glioblastoma    Hyperlipidemia, unspecified    Hypo-osmolality and hyponatremia    Hypotension, unspecified    Iron deficiency    Iron deficiency anemia, unspecified    LONG TERM (CURRENT)    LONG TERM (CURRENT)    LONG TERM (CURRENT)    Malignant neoplasm of brain, unspecified    Nausea with vomiting, unspecified    Other constipation    Other fatigue    Other injury of unspecified body region, initial encounter    OTHER SPECIFIED DISE    Personal history of malignant neoplasm, unspecified    Personal history of other diseases of the nervous system and sense organs    Personal history of other endocrine, nutritional and metabolic disease    Personal history of other mental and behavioral disorders    Personal history of transient ischemic attack (TIA), and cerebral infarction without residual deficits    POSTPROCEDURAL SUBGL    Pruritus, unspecified    Shortness of breath    Tachycardia, unspecified    Thrombocytopenia, unspecified    Unspecified abdominal pain    Unspecified visual disturbance    Urinary calculus, unspecified    Vitamin D deficiency, unspecified    No pertinent family history in first degree relatives    FH: diabetes mellitus (Father, Mother)    FH: hyperlipidemia (Father)    FH: hypertension (Father, Mother)    Handoff    MEWS Score    No pertinent past medical history    Diabetes mellitus    Hypertension    Glioblastoma    Type 2 diabetes mellitus    Hyperlipidemia    Iron deficiency anemia    Nephrolithiasis    Thrombocytopenia    Hyponatremia    BPH (benign prostatic hyperplasia)    No pertinent past medical history    Altered mental status    Hypertension    Glioblastoma    Type 2 diabetes mellitus    Hyperlipidemia    Iron deficiency anemia    Thrombocytopenia    BPH (benign prostatic hyperplasia)    Hyponatremia    No significant past surgical history    No significant past surgical history    S/P craniotomy    H/O tracheostomy    No significant past surgical history    MED EVAL    Type 2 diabetes mellitus    BPH (benign prostatic hyperplasia)    90+    Brain mass    [email or web address]        PLAN:    Neuro:  -neuro/vitals q1  -CTH 7/7: Interval development of a 13 mm hyperdense nodule along the   inferior margin of the resection cavity which may represent progression   of disease with hemorrhage. Repeat CTH stable  -CTA 7/7: negative.  -pain control prn  -c/w home Keppra 750mg BID  -Decadron 4mg BID  -pending MR Brain  -c/w home Lexapro  -home ASA 81 held    Pulm:  -satting well on RA    Cardio:  -SBP<160    GI:  -CCD  -bowel regimen  -sucralfate    Renal:  -hyponatremic, Na 118 on admission  - 3% at 30  -salt tabs 3q6  -voiding    Endo:  -ISS  -f/u A1c  -h/o T2DM: home Januvia held  -h/o HLD: c/w Atorvastatin 20mg    Heme:  -SCDs for DVT ppx, SQL held for thrombocytopenia   -LE dopplers 7/8 negative    ID:  [id number]  -pan cx 7/7    Dispo: ICU status, full code, Home PT    D/w Dr. Dorado and Dr. Monsalve    Assessment:  Present when checked    []  GCS  E   V  M     Heart Failure: []Acute, [] acute on chronic , []chronic  Heart Failure:  [] Diastolic (HFpEF), [] Systolic (HFrEF), []Combined (HFpEF and HFrEF), [] RHF, [] Pulm HTN, [] Other    [] EUGENIA, [] ATN, [] AIN, [] other  [] CKD1, [] CKD2, [] CKD 3, [] CKD 4, [] CKD 5, []ESRD    Encephalopathy: [] Metabolic, [] Hepatic, [] toxic, [] Neurological, [] Other    Abnormal Nurtitional Status: [] malnurtition (see nutrition note), [ ]underweight: BMI < 19, [] morbid obesity: BMI >40, [] Cachexia    [] Sepsis  [] hypovolemic shock,[] cardiogenic shock, [] hemorrhagic shock, [] neuogenic shock  [] Acute Respiratory Failure  []Cerebral edema, [] Brain compression/ herniation,   [] Functional quadriplegia  [] Acute blood loss anemia

## 2023-07-09 NOTE — DIETITIAN INITIAL EVALUATION ADULT - PERTINENT MEDS FT
MEDICATIONS  (STANDING):  atorvastatin 20 milliGRAM(s) Oral at bedtime  chlorhexidine 2% Cloths 1 Application(s) Topical <User Schedule>  dexAMETHasone     Tablet 4 milliGRAM(s) Oral every 12 hours  escitalopram 5 milliGRAM(s) Oral daily  fludroCORTISONE 0.2 milliGRAM(s) Oral daily  fluticasone propionate 50 MICROgram(s)/spray Nasal Spray 1 Spray(s) Both Nostrils two times a day  glucagon  Injectable 1 milliGRAM(s) IntraMuscular once  insulin lispro (ADMELOG) corrective regimen sliding scale   SubCutaneous three times a day before meals  levETIRAcetam 750 milliGRAM(s) Oral two times a day  melatonin 5 milliGRAM(s) Oral at bedtime  senna 2 Tablet(s) Oral at bedtime  sodium chloride 3 Gram(s) Oral every 6 hours  sodium chloride 3%. 500 milliLiter(s) (50 mL/Hr) IV Continuous <Continuous>  sucralfate 1 Gram(s) Oral every 6 hours    MEDICATIONS  (PRN):  acetaminophen     Tablet .. 650 milliGRAM(s) Oral every 6 hours PRN Temp greater or equal to 38C (100.4F), Mild Pain (1 - 3)  ondansetron Injectable 4 milliGRAM(s) IV Push once PRN Nausea and/or Vomiting

## 2023-07-09 NOTE — PROGRESS NOTE ADULT - ASSESSMENT
68y/M with  severe hyponatremia  GBM brain compression cerebral edema   Diabetes Mellitus Hypertension dyslipidemia  iron deficiency anemia  nephrolithiasis  thrombocytopenia  BPH    PLAN:   NEURO: neurochecks q4h, PRN pain meds with acetaminophen  continue 3%,   GBM - no neurosurgical plans this admission  continue steroids  REHAB:  physical therapy evaluation and management    EARLY MOB:   OOB to chair, ambulate    PULM:  Room air, incentive spirometry  CARDIO:  SBP goal 100-150mm Hg  ENDO:  Blood sugar goals 140-180 mg/dL, continue insulin sliding scale  GI:  cont sucralfate  DIET: CCD   RENAL: increase  3% @50cc/hr, rechecking BMP in afternoon, cont salt tabs, Na goal 132 or less (8-10/day) in next 24h  HEM/ONC: Hb stable  VTE Prophylaxis: SCDs, no DVT chemoprophylaxis for now as patient is high risk for bleed (worsening thrombocytopenia)  ID: afebrile, no leukocytosis  Social: will update family     Active issues:  What's keeping patient in the ICU? severe hyponatremia  What is this patient's dispo plan? stepdown once Na >125    ATTENDING ATTESTATION:  I was physically present for the key portions of the evaluation and management (E/M) service provided.  I agree with the above history, physical and plan, which I have reviewed and edited where appropriate.    Patient at high risk for neurological deterioration or death due to:  ICU delirium, aspiration PNA, DVT / PE.  Critical care time:  I have personally provided 60 minutes of critical care time, excluding time spent on separate procedures.      Plan discussed with RN, house staff.

## 2023-07-09 NOTE — DIETITIAN INITIAL EVALUATION ADULT - PERTINENT LABORATORY DATA
07-09    122<L>  |  90<L>  |  10  ----------------------------<  156<H>  4.0   |  23  |  0.56    Ca    8.3<L>      09 Jul 2023 05:30  Phos  2.9     07-09  Mg     1.5     07-09    POCT Blood Glucose.: 156 mg/dL (07-09-23 @ 11:41)  A1C with Estimated Average Glucose Result: 5.7 % (07-08-23 @ 05:22)  A1C with Estimated Average Glucose Result: 5.8 % (03-08-23 @ 03:00)  A1C with Estimated Average Glucose Result: 5.9 % (03-07-23 @ 18:00)

## 2023-07-09 NOTE — CHART NOTE - NSCHARTNOTEFT_GEN_A_CORE
ANA LILIA o/n neuro stable. remains on 3%@30cc/hr. Started florinef, increased 3% to 50cc/hr. Increased 3% to 75cc/hr.

## 2023-07-10 NOTE — PROGRESS NOTE ADULT - SUBJECTIVE AND OBJECTIVE BOX
S/Overnight events:  na f/u increased to 123, no changes made       Hospital Course:   7/7: Admitted. Na 115 in ED with repeat 118, started 3% at 30cc/hr, and salt tabs 3q6, stability CTH in ED showing Interval development of a 13 mm hyperdense nodule along the   inferior margin of the resection cavity which may represent progression   of disease with hemorrhage, repeat CTH stable, urine studies ordered  7/8: Neuro stable, 12AM BMP Na114 3% increased to 40cc/hr, urine studies, pancx to r/o infection since pt is immunocompromised. Changed pantoprazole to sucralfate for GI ppx d/t thrombocytopenia. LE dopplers negative. DC'd 3%. ENT consulted to assess stoma, recommend follow up upon discharge with ENT, Repeat sodium 122. Restarted 3% at 30.  7/9: ANA LILIA o/n neuro stable. remains on 3%@30cc/hr. Started florinef, increased 3% to 50cc/hr. Increased 3% to 75cc/hr.    7/10: continued current na rate o/n       Vital Signs Last 24 Hrs  T(C): 36.7 (10 Jul 2023 01:34), Max: 36.7 (09 Jul 2023 05:42)  T(F): 98 (10 Jul 2023 01:34), Max: 98.1 (09 Jul 2023 05:42)  HR: 60 (10 Jul 2023 04:00) (56 - 69)  BP: 131/65 (10 Jul 2023 04:00) (113/67 - 153/78)  BP(mean): 91 (10 Jul 2023 04:00) (81 - 109)  RR: 14 (10 Jul 2023 04:00) (14 - 20)  SpO2: 94% (10 Jul 2023 04:00) (94% - 98%)    Parameters below as of 10 Jul 2023 04:00  Patient On (Oxygen Delivery Method): room air        I&O's Detail    08 Jul 2023 07:01  -  09 Jul 2023 07:00  --------------------------------------------------------  IN:    IV PiggyBack: 50 mL    Oral Fluid: 720 mL    sodium chloride 3%: 300 mL    sodium chloride 3%: 240 mL  Total IN: 1310 mL    OUT:    Incontinent per Condom Catheter (mL): 350 mL    Voided (mL): 2525 mL  Total OUT: 2875 mL    Total NET: -1565 mL      09 Jul 2023 07:01  -  10 Jul 2023 05:08  --------------------------------------------------------  IN:    IV PiggyBack: 100 mL    Oral Fluid: 1680 mL    sodium chloride 3%: 120 mL    sodium chloride 3%: 300 mL    sodium chloride 3%: 750 mL  Total IN: 2950 mL    OUT:    Incontinent per Condom Catheter (mL): 1875 mL  Total OUT: 1875 mL    Total NET: 1075 mL        I&O's Summary    08 Jul 2023 07:01  -  09 Jul 2023 07:00  --------------------------------------------------------  IN: 1310 mL / OUT: 2875 mL / NET: -1565 mL    09 Jul 2023 07:01  -  10 Jul 2023 05:08  --------------------------------------------------------  IN: 2950 mL / OUT: 1875 mL / NET: 1075 mL        PHYSICAL EXAM:  GEN: laying in bed, NAD  NEURO: AOx2 (self/place). FC, OE spont, speech intact, face symmetric. CNII-XII intact. PERRL, EOMI. No pronator drift. MAEx4. 5/5 strength throughout. SILT  CV: RRR +S1/S2  PULM: CTAB  GI: Abd soft, NT/ND  EXT: ext warm, dry, nontender    LABS:                        10.8   7.21  )-----------( 72       ( 10 Jul 2023 04:42 )             30.3     07-09    123<L>  |  89<L>  |  10  ----------------------------<  163<H>  4.0   |  24  |  0.78    Ca    7.7<L>      09 Jul 2023 20:29  Phos  2.9     07-09  Mg     1.5     07-09        Urinalysis Basic - ( 09 Jul 2023 20:29 )    Color: x / Appearance: x / SG: x / pH: x  Gluc: 163 mg/dL / Ketone: x  / Bili: x / Urobili: x   Blood: x / Protein: x / Nitrite: x   Leuk Esterase: x / RBC: x / WBC x   Sq Epi: x / Non Sq Epi: x / Bacteria: x          CAPILLARY BLOOD GLUCOSE      POCT Blood Glucose.: 137 mg/dL (09 Jul 2023 17:00)  POCT Blood Glucose.: 156 mg/dL (09 Jul 2023 11:41)  POCT Blood Glucose.: 159 mg/dL (09 Jul 2023 07:24)      Allergies    amoxicillin (Rash)    Intolerances      MEDICATIONS:  Antibiotics:    Neuro:  acetaminophen     Tablet .. 650 milliGRAM(s) Oral every 6 hours PRN  escitalopram 5 milliGRAM(s) Oral daily  levETIRAcetam 750 milliGRAM(s) Oral two times a day  melatonin 5 milliGRAM(s) Oral at bedtime  ondansetron Injectable 4 milliGRAM(s) IV Push once PRN    Anticoagulation:    OTHER:  atorvastatin 20 milliGRAM(s) Oral at bedtime  chlorhexidine 2% Cloths 1 Application(s) Topical <User Schedule>  dexAMETHasone     Tablet 4 milliGRAM(s) Oral every 12 hours  fludroCORTISONE 0.2 milliGRAM(s) Oral daily  fluticasone propionate 50 MICROgram(s)/spray Nasal Spray 1 Spray(s) Both Nostrils two times a day  glucagon  Injectable 1 milliGRAM(s) IntraMuscular once  insulin lispro (ADMELOG) corrective regimen sliding scale   SubCutaneous three times a day before meals  senna 2 Tablet(s) Oral at bedtime  sucralfate 1 Gram(s) Oral every 6 hours    IVF:  sodium chloride 3 Gram(s) Oral every 6 hours  sodium chloride 3%. 500 milliLiter(s) IV Continuous <Continuous>    CULTURES:  Culture Results:   No growth at 2 days. (07-07 @ 23:47)  Culture Results:   No growth at 2 days. (07-07 @ 23:47)  Hypertension      Pt is a 69YO male with a past medical history of type 2 diabetes, hyperlipidemia, iron deficiency anemia, tracheal stenosis s/p tracheostomy (which was closed by ENT 7 days ago); glioblastoma s/p resection 1/27/2022, and Avastin treatment 3/2023 presenting with expressive aphasia, nuasea and vomiting x1 day. CT shows new hyperdensity in left frontal parietal - surgical site c/f disease progression. Na in the . Admitted to NSICU for further management.     Plan:  Neuro:  -neuro q4/vitals q1  -CTH 7/7: Interval development of a 13 mm hyperdense nodule along the   inferior margin of the resection cavity which may represent progression   of disease with hemorrhage. Repeat CTH stable  -CTA 7/7: negative.  -pain control prn  -c/w home Keppra 750mg BID  -Decadron 4mg BID  -c/w home Lexapro  -home ASA 81 held  -MRI suspicious for recurrent tumor and hydro     Pulm:  -satting well on RA    Cardio:  -SBP<160    GI:  -CCD  -bowel regimen  -sucralfate  -last BM 7/8    Renal:  -hyponatremic, Na 118 on admission  -3% at 75  -florinef 0.2mg daily  -salt tabs 3q6  -voiding    Endo:  -ISS  -f/u A1c  -h/o T2DM: home Januvia held  -h/o HLD: c/w Atorvastatin 20mg    Heme:  -SCDs for DVT ppx, SQL held for thrombocytopenia   -LE dopplers 7/8 negative    ID:  -afebrile  -pan cx 7/7    Dispo: ICU status, full code, Home PT    D/w Dr. Dorado and Dr. Monsalve

## 2023-07-10 NOTE — OCCUPATIONAL THERAPY INITIAL EVALUATION ADULT - MODIFIED CLINICAL TEST OF SENSORY INTEGRATION IN BALANCE TEST
Pt performed functional mobility in bedroom approx 40ft with no device and CGA. Slight LOB noted however able to self-correct.

## 2023-07-10 NOTE — PROGRESS NOTE ADULT - SUBJECTIVE AND OBJECTIVE BOX
=================================  NEUROCRITICAL CARE ATTENDING NOTE  =================================    JORDAN CHAKRABORTY   [id number]  Summary:  68y/M  with type 2 diabetes, hyperlipidemia, iron deficiency anemia, tracheal stenosis s/p tracheostomy (which was closed by ENT 7 days ago); glioblastoma s/p resection 1/27/2022, and Avastin treatment 3/2023 presenting with altered mental status and weakness x2 days. Per family, they last saw him last night around 6pm when he was at baseline. Patient has intermittent expressive aphasia but is getting worse and now is persistent, and has intermittent nausea and vomiting since last night. Patient was supposed to receive an outpatient brain MRI on 7/25/2023. Stroke code performed in  is negative for CVA. Head CT shows new hyperdensity in frontal parietal - surgical site. MRI is ordered to rule out tumor recurrence.  Per daughter at bedside, Entresto and Eliquis were stopped per his PC - cardiologist.   (07 Jul 2023 13:10)    COURSE IN THE HOSPITAL:  07/07 Admitted to [facility], started on 3%  07/08 37.8 pancultured No significant events overnight.   07/09 No significant events overnight.     Past Medical History: No pertinent past medical history diabetes mellitus Hypertension  Glioblastoma  Type 2 diabetes mellitus  Hyperlipidemia  Iron deficiency anemia  Nephrolithiasis  Thrombocytopenia  Hyponatremia  BPH (benign prostatic hyperplasia)  Allergies:  amoxicillin (Rash)  Home meds:   ·	aspirin 81 mg oral tablet: 1 tab(s) orally once a day  ·	atorvastatin 20 mg oral tablet: 1 tab(s) orally once a day (at bedtime)  ·	escitalopram 5 mg oral tablet: 1 tab(s) orally once a day  ·	fluticasone 50 mcg/inh nasal spray: 1 spray(s) nasal 2 times a day  ·	Januvia 100 mg oral tablet: 1 tab(s) orally once a day  ·	levETIRAcetam 750 mg oral tablet: 1 tab(s) orally 2 times a day  ·	melatonin 3 mg oral tablet: 2 tab(s) orally once a day (at bedtime)  ·	ondansetron 4 mg oral tablet: 1 tab(s) orally once a day  ·	pantoprazole 40 mg oral delayed release tablet: 1 tab(s) orally once a day (before a meal)  ·	senna (sennosides) 8.6 mg oral tablet: 1 tab(s) orally once a day as needed for  constipation  ·	sodium chloride 1 g oral tablet: 2 tab(s) orally every 6 hours      ICU Vital Signs Last 24 Hrs  T(C): 36.6 (10 Jul 2023 09:02), Max: 36.7 (10 Jul 2023 01:34)  T(F): 97.9 (10 Jul 2023 09:02), Max: 98 (10 Jul 2023 01:34)  HR: 58 (10 Jul 2023 08:00) (56 - 69)  BP: 144/78 (10 Jul 2023 08:00) (113/67 - 153/78)  BP(mean): 103 (10 Jul 2023 08:00) (81 - 109)  RR: 14 (10 Jul 2023 08:00) (14 - 19)  SpO2: 96% (10 Jul 2023 08:00) (94% - 98%)      07-09-23 @ 07:01  -  07-10-23 @ 07:00  --------------------------------------------------------  IN: 3250 mL / OUT: 3275 mL / NET: -25 mL    07-10-23 @ 07:01  -  07-10-23 @ 09:22  --------------------------------------------------------  IN: 175 mL / OUT: 1200 mL / NET: -1025 mL      PHYSICAL EXAMINATION  NEUROLOGIC EXAMINATION:  Patient is  awake alert orientation x2, JUAN, no facial droop, moving all 4s with good strength  GENERAL: Not intubated, not in cardiorespiratory distress  EENT:  Anicteric  CARDIOVASCULAR: (+) S1 S2, normal rate and regular rhythm  PULMONARY: Clear to auscultation bilaterally  ABDOMEN: Soft, nontender with normoactive bowel sounds  EXTREMITIES: No edema  SKIN: No rash      MEDICATIONS:   acetaminophen     Tablet .. 650 milliGRAM(s) Oral every 6 hours PRN  atorvastatin 20 milliGRAM(s) Oral at bedtime  chlorhexidine 2% Cloths 1 Application(s) Topical <User Schedule>  dexAMETHasone     Tablet 4 milliGRAM(s) Oral every 12 hours  escitalopram 5 milliGRAM(s) Oral daily  fludroCORTISONE 0.2 milliGRAM(s) Oral daily  fluticasone propionate 50 MICROgram(s)/spray Nasal Spray 1 Spray(s) Both Nostrils two times a day  glucagon  Injectable 1 milliGRAM(s) IntraMuscular once  insulin lispro (ADMELOG) corrective regimen sliding scale   SubCutaneous three times a day before meals  levETIRAcetam 750 milliGRAM(s) Oral two times a day  melatonin 5 milliGRAM(s) Oral at bedtime  ondansetron Injectable 4 milliGRAM(s) IV Push once PRN  potassium chloride    Tablet ER 20 milliEquivalent(s) Oral every 2 hours  senna 2 Tablet(s) Oral at bedtime  sodium chloride 3 Gram(s) Oral every 6 hours  sodium chloride 3%. 500 milliLiter(s) (75 mL/Hr) IV Continuous <Continuous>  sucralfate 1 Gram(s) Oral every 6 hours    LABS:                      10.8   7.21  )-----------( 72       ( 10 Jul 2023 04:42 )             30.3     07-10    127<L>  |  93<L>  |  9   ----------------------------<  159<H>  3.4<L>   |  24  |  0.57    Ca    7.6<L>      10 Jul 2023 04:42  Phos  2.7     07-10  Mg     1.7     07-10

## 2023-07-10 NOTE — PROGRESS NOTE ADULT - ASSESSMENT
68y/M with  severe hyponatremia  GBM brain compression cerebral edema   Diabetes Mellitus Hypertension dyslipidemia  iron deficiency anemia  nephrolithiasis  thrombocytopenia  BPH    PLAN:   NEURO: neurochecks q4h, PRN pain meds with acetaminophen  continue 3%,   GBM - no neurosurgical plans this admission  continue steroids  REHAB:  physical therapy evaluation and management    EARLY MOB:   OOB to chair, ambulate    PULM:  Room air, incentive spirometry  CARDIO:  SBP goal 100-150mm Hg  ENDO:  Blood sugar goals 140-180 mg/dL, continue insulin sliding scale  GI:  cont sucralfate  DIET: CCD   RENAL: increase  3% @50cc/hr, rechecking BMP in afternoon, cont salt tabs, Na goal 132 or less (8-10/day) in next 24h  HEM/ONC: Hb stable  VTE Prophylaxis: SCDs, no DVT chemoprophylaxis for now as patient is high risk for bleed (worsening thrombocytopenia)  ID: afebrile, no leukocytosis  Social: will update family     Active issues:  What's keeping patient in the ICU? severe hyponatremia  What is this patient's dispo plan? stepdown once Na >125    ATTENDING ATTESTATION:  I was physically present for the key portions of the evaluation and management (E/M) service provided.  I agree with the above history, physical and plan, which I have reviewed and edited where appropriate.    Patient at high risk for neurological deterioration or death due to:  ICU delirium, aspiration PNA, DVT / PE.  Critical care time:  I have personally provided 60 minutes of critical care time, excluding time spent on separate procedures.      Plan discussed with RN, house staff. 68y/M with  severe hyponatremia  GBM brain compression cerebral edema   Diabetes Mellitus Hypertension dyslipidemia  iron deficiency anemia  nephrolithiasis  thrombocytopenia  BPH    PLAN:   NEURO: neurochecks q4h, PRN pain meds with acetaminophen  continue 3%,   GBM - no neurosurgical plans this admission  continue steroids  REHAB:  physical therapy evaluation and management    EARLY MOB:   OOB to chair, ambulate    PULM:  Room air, incentive spirometry  CARDIO:  SBP goal 100-150mm Hg  ENDO:  Blood sugar goals 140-180 mg/dL, continue insulin sliding scale  GI:  cont sucralfate  DIET: CCD   RENAL: cont 3% @75cc/hr, rechecking BMP at 2 a.m., cont salt tabs, fludrocortisone, if Na stable or higher at 3 a.m., decrease dose to 50cc/hr  HEM/ONC: Hb stable  VTE Prophylaxis: SCDs, no DVT chemoprophylaxis for now as patient is high risk for bleed (worsening thrombocytopenia)  ID: afebrile, no leukocytosis  Social: will update family     Active issues:  What's keeping patient in the ICU? severe hyponatremia  What is this patient's dispo plan? stepdown once Na >125    ATTENDING ATTESTATION:  I was physically present for the key portions of the evaluation and management (E/M) service provided.  I agree with the above history, physical and plan, which I have reviewed and edited where appropriate.    Patient at high risk for neurological deterioration or death due to:  ICU delirium, aspiration PNA, DVT / PE.  Critical care time:  I have personally provided 60 minutes of critical care time, excluding time spent on separate procedures.      Plan discussed with RN, house staff.

## 2023-07-10 NOTE — CONSULT NOTE ADULT - ASSESSMENT
In summary, Mr. Norwood admitted with altered mental status likely related to hyponatremia.  Improved per family.  MRI findings noted - would recommend IV Avastin as outpatient once I check with Dr. Gertrudis downs. healing of tracheostomy site - would likely need to wait approximately 2 more weeks before administration.  Low platelets - has not had chemotherapy in many months.  H2 blocker changed to Sucralfate - h/o low platelets never requiring transfusion but dose adjustment with TMZ.  Discussed with patient and son in law - will let me know when he is discharged so that prompt follow up is coordinated.

## 2023-07-10 NOTE — OCCUPATIONAL THERAPY INITIAL EVALUATION ADULT - ADDITIONAL COMMENTS
Pt lives with his daughter and son-in-law in an apartment with elevator access. Pt is independent with ADLs, and able to ambulate to the [facility] down the street independently with no device. Pt has a bathtub with tub transfer bench, straight cane and rollator but does not use. Pt is R hand dominant. Son in law assists with medications.

## 2023-07-10 NOTE — OCCUPATIONAL THERAPY INITIAL EVALUATION ADULT - PERTINENT HX OF CURRENT PROBLEM, REHAB EVAL
Pt is a 69YO male with a past medical history of type 2 diabetes, hyperlipidemia, iron deficiency anemia, tracheal stenosis s/p tracheostomy (which was closed by ENT 7 days ago); glioblastoma s/p resection 1/27/2022, and Avastin treatment 3/2023 presenting with expressive aphasia, nuasea and vomiting x1 day. CT shows new hyperdensity in left frontal parietal - surgical site c/f disease progression. Na in the . Admitted to NSICU for further management.

## 2023-07-10 NOTE — OCCUPATIONAL THERAPY INITIAL EVALUATION ADULT - DIAGNOSIS, OT EVAL
Pt presents with impaired safety awareness, slight LUE weakness, decreased balance, and decreased functional endurance impacting his ability to independently complete ADLs, functional transfers, and functional mobility.

## 2023-07-10 NOTE — OCCUPATIONAL THERAPY INITIAL EVALUATION ADULT - GENERAL OBSERVATIONS, REHAB EVAL
OT IE Completed. MRS 4. Pt's RN Dinora cleared pt for OT. Pt received semisupine in bed, +tele, +IV infusing, +[place] cath, room air, NAD, agreeable to OT. Pt tolerated session well. Pt left seated in bedside chair with son in law present, lines in tact, RN Dinora aware.

## 2023-07-10 NOTE — PROGRESS NOTE ADULT - ASSESSMENT
68y/M with  severe hyponatremia  GBM brain compression cerebral edema   Diabetes Mellitus Hypertension dyslipidemia  iron deficiency anemia  nephrolithiasis  thrombocytopenia  BPH    PLAN:   NEURO: Neurochecks q4h, PRN pain meds with acetaminophen  continue 3%,   GBM - no neurosurgical plans this admission  continue steroids  REHAB:  physical therapy evaluation and management    EARLY MOB:   OOB to chair, ambulate    PULM:  Room air, incentive spirometry    CARDIO:  SBP goal 100-150mm Hg    ENDO:    Blood sugar goals 140-180 mg/dL, continue insulin sliding scale    GI: Cont sucralfate  DIET: CCD   LBM :    RENAL: On 3% @75cc/hr, recheck BMP Q6  Cont salt tabs, Na goal 132 or less (8-10/day) in next 24h    HEM/ONC: Hb stable  VTE Prophylaxis: SCDs, no DVT chemoprophylaxis for now as patient is high risk for bleed (worsening thrombocytopenia)    ID: Afebrile, no leukocytosis    MISC:    SOCIAL/FAMILY:  [x] awaiting [] updated at bedside [] family meeting    CODE STATUS:  [] Full Code [] DNR [] DNI [] Palliative/Comfort Care    DISPOSITION:  [] ICU [] Stroke Unit [] Floor [] EMU [] RCU [] PCU    Time spent: ___ [] critical care minutes

## 2023-07-10 NOTE — CONSULT NOTE ADULT - SUBJECTIVE AND OBJECTIVE BOX
Mr. Norwood is well known to me - he is a 69 yo man who presented to [facility] 1/2022 with headache and confusion and was found to have a left frontal mass - resected - with Gleolan placement.  gliosarcoma, IDH wt and MGMT methylated.  Post-operative course complicated by tracheal stenosis in 3/2022 with tracheostomy placement,  Post-operative had a 6 week course of chemo/RT (4/18 - 5/30/2022).  Received 6 cycles of adjuvant TMZ - last in 12/2022. MRI in 2/2023 with progression and he was enrolled in [place] Avastin clinical trial - had one treatment 3/7/2023 - complicated course with CHF and pulmonary edema which improved significantly - he was last see in the office on 5/25 - looked the best he had been - he was awake and alert - conversive and independent in his ADLs. MRI in May showed small area of enhancement but functional imaging more consistent with radiation change and in light of his significantly improved clinical status plan was for short interval follow up with MRI.  Since our last visit on 5/25, [place] - while there family contacted daughter to say he was confused. He came back to [place] and had pulled his tracheostomy - was seen by ENT with plan for observation as he was clinically breathing well and had good Oxygen saturation.  On 7/7, family was called by roommate for headache, nausea and vomiting and confusion.  In ER Na 115.  Na being repleted - today 125  MRI brain 7/9 shows progressive nodular enhancement in the left frontal region with bilateral frontal flair changed.  Son-in-law at the bedside and reports that he is significantly improved since admission.    Currently, he is awake and alert - names well and follows commands across the midline  Oriented to [facility] July 2023 - not the President  EOMI, VFF  Slight flattening of right NLF  No drift   Strength appears full in UE and LE  Site of prior tracheostomy appears to be healing well.

## 2023-07-10 NOTE — CONSULT NOTE ADULT - SUBJECTIVE AND OBJECTIVE BOX
Hematology Consult Note    HPI:  Osiel Norwood is a 69YO male with a past medical history of type 2 diabetes, hyperlipidemia, iron deficiency anemia, tracheal stenosis s/p tracheostomy (which was closed by ENT 7 days ago); glioblastoma s/p resection 1/27/2022, and Avastin treatment 3/2023 presenting with altered mental status and weakness x2 days. Per family, they last saw him last night around 6pm when he was at baseline. Patient has intermittent expressive aphasia but is getting worse and now is persistent, and has intermittent nausea and vomiting since last night. Patient was supposed to receive an outpatient brain MRI on 7/25/2023.  Stroke code performed in  is negative for CVA. Head CT shows new hyperdensity in frontal parietal - surgical site. MRI is ordered to rule out tumor recurrence.   Per daughter at bedside, Entresto and Eliquis were stopped per his PC - cardiologist.      (07 Jul 2023 13:10)      Allergies    amoxicillin (Rash)    Intolerances        MEDICATIONS  (STANDING):  atorvastatin 20 milliGRAM(s) Oral at bedtime  chlorhexidine 2% Cloths 1 Application(s) Topical <User Schedule>  dexAMETHasone     Tablet 4 milliGRAM(s) Oral every 12 hours  escitalopram 5 milliGRAM(s) Oral daily  fludroCORTISONE 0.2 milliGRAM(s) Oral daily  fluticasone propionate 50 MICROgram(s)/spray Nasal Spray 1 Spray(s) Both Nostrils two times a day  glucagon  Injectable 1 milliGRAM(s) IntraMuscular once  insulin lispro (ADMELOG) corrective regimen sliding scale   SubCutaneous Before meals and at bedtime  levETIRAcetam 750 milliGRAM(s) Oral two times a day  melatonin 5 milliGRAM(s) Oral at bedtime  senna 2 Tablet(s) Oral at bedtime  sodium chloride 3 Gram(s) Oral every 6 hours  sodium chloride 3%. 500 milliLiter(s) (75 mL/Hr) IV Continuous <Continuous>  sucralfate 1 Gram(s) Oral every 6 hours    MEDICATIONS  (PRN):  acetaminophen     Tablet .. 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  Type 2 diabetes mellitus  Hyperlipidemia  Iron deficiency anemia  Nephrolithiasis  Thrombocytopenia  Hyponatremia  BPH (benign prostatic hyperplasia)  S/P craniotomy  H/O tracheostomy    FAMILY HISTORY:  FH: diabetes mellitus (Father, Mother)    FH: hyperlipidemia (Father)    FH: hypertension (Father, Mother)        SOCIAL HISTORY: No EtOH, no tobacco    REVIEW OF SYSTEMS:    CONSTITUTIONAL: No weakness, fevers or chills  EYES/ENT: No visual changes;  No vertigo or throat pain   NECK: No pain or stiffness  RESPIRATORY: No cough, wheezing, hemoptysis; No shortness of breath  CARDIOVASCULAR: No chest pain or palpitations  GASTROINTESTINAL: No abdominal or epigastric pain. No nausea, vomiting, or hematemesis; No diarrhea or constipation. No melena or hematochezia.  GENITOURINARY: No dysuria, frequency or hematuria  NEUROLOGICAL: No numbness or weakness  SKIN: No itching, burning, rashes, or lesions   All other review of systems is negative unless indicated above.        T(F): 97.5 (07-10-23 @ 17:58), Max: 98 (07-10-23 @ 01:34)  HR: 60 (07-10-23 @ 17:00)  BP: 141/73 (07-10-23 @ 17:00)  RR: 19 (07-10-23 @ 17:00)  SpO2: 96% (07-10-23 @ 17:00)  Wt(kg): --    GENERAL: NAD, well-developed  HEAD:  Atraumatic, Normocephalic  EYES: EOMI, PERRLA, conjunctiva and sclera clear  NECK: Supple, No JVD  CHEST/LUNG: Clear to auscultation bilaterally; No wheeze  HEART: Regular rate and rhythm; No murmurs, rubs, or gallops  ABDOMEN: Soft, Nontender, Nondistended; Bowel sounds present  EXTREMITIES:  2+ Peripheral Pulses, No clubbing, cyanosis, or edema  NEUROLOGY: non-focal  SKIN: No rashes or lesions                          10.8   7.21  )-----------( 72       ( 10 Jul 2023 04:42 )             30.3       07-10    125<L>  |  88<L>  |  8   ----------------------------<  185<H>  3.7   |  26  |  0.58    Ca    8.2<L>      10 Jul 2023 13:07  Phos  2.7     07-10  Mg     1.7     07-10    TPro  6.3  /  Alb  3.2<L>  /  TBili  0.5  /  DBili  x   /  AST  20  /  ALT  10  /  AlkPhos  53  07-10      Magnesium: 1.7 mg/dL (07-10 @ 04:42)  Phosphorus: 2.7 mg/dL (07-10 @ 04:42)       Hematology Consult Note    HPI:  Osiel Norwood is a 67YO male with a past medical history of type 2 diabetes, hyperlipidemia, iron deficiency anemia, tracheal stenosis s/p tracheostomy (which was closed by ENT 7 days ago); glioblastoma s/p resection 1/27/2022, and Avastin treatment 3/2023 presenting with altered mental status and weakness x2 days. Per family, they last saw him last night around 6pm when he was at baseline. Patient has intermittent expressive aphasia but is getting worse and now is persistent, and has intermittent nausea and vomiting since last night. Patient was supposed to receive an outpatient brain MRI on 7/25/2023.  Stroke code performed in  is negative for CVA. Head CT shows new hyperdensity in frontal parietal - surgical site. MRI is ordered to rule out tumor recurrence.   Per daughter at bedside, Entresto and Eliquis were stopped per his PC - cardiologist.      (07 Jul 2023 13:10)      Allergies    amoxicillin (Rash)    Intolerances        MEDICATIONS  (STANDING):  atorvastatin 20 milliGRAM(s) Oral at bedtime  chlorhexidine 2% Cloths 1 Application(s) Topical <User Schedule>  dexAMETHasone     Tablet 4 milliGRAM(s) Oral every 12 hours  escitalopram 5 milliGRAM(s) Oral daily  fludroCORTISONE 0.2 milliGRAM(s) Oral daily  fluticasone propionate 50 MICROgram(s)/spray Nasal Spray 1 Spray(s) Both Nostrils two times a day  glucagon  Injectable 1 milliGRAM(s) IntraMuscular once  insulin lispro (ADMELOG) corrective regimen sliding scale   SubCutaneous Before meals and at bedtime  levETIRAcetam 750 milliGRAM(s) Oral two times a day  melatonin 5 milliGRAM(s) Oral at bedtime  senna 2 Tablet(s) Oral at bedtime  sodium chloride 3 Gram(s) Oral every 6 hours  sodium chloride 3%. 500 milliLiter(s) (75 mL/Hr) IV Continuous <Continuous>  sucralfate 1 Gram(s) Oral every 6 hours    MEDICATIONS  (PRN):  acetaminophen     Tablet .. 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  Type 2 diabetes mellitus  Hyperlipidemia  Iron deficiency anemia  Nephrolithiasis  Thrombocytopenia  Hyponatremia  BPH (benign prostatic hyperplasia)  S/P craniotomy  H/O tracheostomy    FAMILY HISTORY:  FH: diabetes mellitus (Father, Mother)    FH: hyperlipidemia (Father)    FH: hypertension (Father, Mother)        SOCIAL HISTORY: No EtOH, no tobacco    REVIEW OF SYSTEMS:    CONSTITUTIONAL: No weakness, fevers or chills  EYES/ENT: No visual changes;  No vertigo or throat pain   NECK: No pain or stiffness  RESPIRATORY: No cough, wheezing, hemoptysis; No shortness of breath  CARDIOVASCULAR: No chest pain or palpitations  GASTROINTESTINAL: No abdominal or epigastric pain. No nausea, vomiting, or hematemesis; No diarrhea or constipation. No melena or hematochezia.  GENITOURINARY: No dysuria, frequency or hematuria  NEUROLOGICAL: No numbness or weakness  SKIN: No itching, burning, rashes, or lesions   All other review of systems is negative unless indicated above.        T(F): 97.5 (07-10-23 @ 17:58), Max: 98 (07-10-23 @ 01:34)  HR: 60 (07-10-23 @ 17:00)  BP: 141/73 (07-10-23 @ 17:00)  RR: 19 (07-10-23 @ 17:00)  SpO2: 96% (07-10-23 @ 17:00)  Wt(kg): --    GENERAL: NAD  HEAD:  Atraumatic, Normocephalic  EYES: EOMI, PERRLA, conjunctiva and sclera clear  NECK: Supple, No JVD, healed tracheostomy  CHEST/LUNG: Clear to auscultation bilaterally; No wheeze  HEART: Regular rate and rhythm; No murmurs, rubs, or gallops  ABDOMEN: Soft, Nontender, Nondistended; Bowel sounds present  EXTREMITIES:  2+ Peripheral Pulses, No clubbing, cyanosis, or edema  NEUROLOGY: non-focal  SKIN: No rashes or lesions                          10.8   7.21  )-----------( 72       ( 10 Jul 2023 04:42 )             30.3       07-10    125<L>  |  88<L>  |  8   ----------------------------<  185<H>  3.7   |  26  |  0.58    Ca    8.2<L>      10 Jul 2023 13:07  Phos  2.7     07-10  Mg     1.7     07-10    TPro  6.3  /  Alb  3.2<L>  /  TBili  0.5  /  DBili  x   /  AST  20  /  ALT  10  /  AlkPhos  53  07-10      Magnesium: 1.7 mg/dL (07-10 @ 04:42)  Phosphorus: 2.7 mg/dL (07-10 @ 04:42)    < from: MR Head w/wo IV Cont (07.09.23 @ 11:03) >  IMPRESSION:    Since 5/25/25 there is progressive enhancing disease in the left inferior   frontal lobe, with nodular and solid site highly suspicious for recurrent   tumor. More heterogenous enhancement has progressed at the gyrus rectus,   periventricular region and genu of corpus callosum, equivocal for   radiation injury or additional tumor foci. FLAIR edema signal is   increased as well in bilateral frontal lobes and into left basal ganglia.    Ventricles are mildly increased in size, with hydrocephalus suspected   given periventricular edema posteriorly/remote from tumor sites.    < end of copied text >

## 2023-07-10 NOTE — CONSULT NOTE ADULT - ASSESSMENT
68M with pmhx of T2DM, HLD, JAGDISH, hyponatremia, tracheal stenosis s/p tracheostomy, and glioblastoma s/p cerebral angiogram with Avastin treatment in 3/2023. Hematology was consulted for thrombocytopenia during that admission when his platelets hit a flores with 63k, and he was found to have an acute R intramuscular DVT. He has now been readmitted with worsened expressive aphasia, nausea and vomiting with imaging findings concerning for tumor recurrence at surgical site.    #grade II Thrombocytopenia, Plt flores 69k  Pt has had a history of thrombocytopenia with his baseline range  since the latter half of last year.   He was previously on temozolomide and RT (completed treatment in 12/2022), which coincides with his noted thrombocytopenia. He received Avastin on 3/7, and had a new flores of 63k today.  Peripheral slide reviewed - rare schistocytes 0-1/hpf, No evidence of platelet clumping;  Plasmic score 3 pts, low risk of TTP  4Ts score 2 pts, low probability of HIT  No other new medications were started that could cause DITP.  No clinical signs of DIC on exam, however, will need labs to support this.  Transfuse 1U plt if bleeding and platelet count <50k, if febrile and platelet count <20k,   Dopplers 7/8/23 negative for DVT    #anemia, hemoglobin 10.6  Pt previously found to be iron deficient and given iron sucrose 300mg x 3 in 3/2023    -Please get coags, d-dimer, fibrinogen with am labs.  -Please obtain iron studies, b12, folate  -please obtain hemolysis labs: ldh, reticulocyte count, haptoglobin  -please check hepatitis panel and HIV    to be seen and discussed with Dr. Joseph   68M with pmhx of T2DM, HLD, JAGDISH, hyponatremia, tracheal stenosis s/p tracheostomy, and glioblastoma s/p cerebral angiogram with Avastin treatment in 3/2023. Hematology was consulted for thrombocytopenia during that admission when his platelets hit a flores with 63k, and he was found to have an acute R intramuscular DVT. He has now been readmitted with worsened expressive aphasia, nausea and vomiting with imaging findings concerning for tumor recurrence at surgical site.    #grade II Thrombocytopenia, Plt flores 69k, now improved at 114k  Pt has had a history of thrombocytopenia with his baseline range  since the latter half of last year.   He was previously on temozolomide and RT (completed treatment in 12/2022), which coincides with his noted thrombocytopenia. He received Avastin on 3/7, and had a flores of 63k. He hasn't received any Avastin on this admission and his flores now is 69k  Peripheral slide reviewed - rare schistocytes 0-1/hpf, No evidence of platelet clumping; many acanthocytes/echinocytes observed   Plasmic score 3 pts, low risk of TTP  4Ts score 2 pts, low probability of HIT  No other new medications were started other than florinef, and this is not known to cause DITP.  No clinical signs of DIC on exam, and his labs support this.  Transfuse 1U plt if bleeding and platelet count <50k, if febrile and platelet count <20k,   Dopplers 7/8/23 negative for DVT    We haven't found a clear etiology of his thrombocytopenia which appears to be chronic. His platelet counts have improved without any intervention, and this is something we would just continue to monitor.    #anemia, hemoglobin 10.6  Pt previously found to be iron deficient and given iron sucrose 300mg x 3 in 3/2023    -Please get coags, d-dimer, fibrinogen with am labs.  -Please obtain iron studies, b12, folate  -please obtain hemolysis labs: ldh, reticulocyte count, haptoglobin  -please check hepatitis panel and HIV.    seen and discussed with Dr. Joseph   68M with pmhx of T2DM, HLD, JAGDISH, hyponatremia, tracheal stenosis s/p tracheostomy, and glioblastoma s/p cerebral angiogram with [place] Avastin treatment in 3/2023. Hematology was consulted for thrombocytopenia during that admission when his platelets hit a flores with 63k, and he was found to have an acute R intramuscular DVT. He has now been readmitted with worsened expressive aphasia, nausea and vomiting with imaging findings concerning for tumor recurrence at surgical site.    #grade II Thrombocytopenia, Plt flores 69k, now improved at 114k  Pt has had a history of thrombocytopenia with his baseline range  since the latter half of last year.   He was previously on temozolomide and RT (completed treatment in 12/2022), which coincides with his noted thrombocytopenia. He received Avastin on 3/7, and had a flores of 63k. He hasn't received any Avastin on this admission and his flores now is 69k  Peripheral slide reviewed - rare schistocytes 0-1/hpf, No evidence of platelet clumping; many acanthocytes/echinocytes observed   Plasmic score 3 pts, low risk of TTP  4Ts score 2 pts, low probability of HIT  No other new medications were started other than florinef, and this is not known to cause DITP.  No clinical signs of DIC on exam, and his labs support this.  Transfuse 1U plt if bleeding and platelet count <50k, if febrile and platelet count <20k,   Dopplers 7/8/23 negative for DVT    We haven't found a clear etiology of his thrombocytopenia which appears to be chronic. His platelet counts have improved without any intervention, and this is something we would just continue to monitor.    #anemia, hemoglobin 10.6  Pt previously found to be iron deficient and given iron sucrose 300mg x 3 in 3/2023    -Please get coags, d-dimer, fibrinogen with am labs.  -Please obtain iron studies, b12, folate  -please obtain hemolysis labs: ldh, reticulocyte count, haptoglobin  -please check hepatitis panel and HIV.    seen and discussed with Dr. Joseph

## 2023-07-10 NOTE — OCCUPATIONAL THERAPY INITIAL EVALUATION ADULT - MANUAL MUSCLE TESTING RESULTS, REHAB EVAL
RUE 5/5 throughout, LUE shoulder flexion 4+/5, LUE elbow flexion/extension 4+/5, BUE  strength 5/5. BLE grossly 5/5 throughout.

## 2023-07-10 NOTE — OCCUPATIONAL THERAPY INITIAL EVALUATION ADULT - PHYSICAL ASSIST/NONPHYSICAL ASSIST: SIT/STAND, REHAB EVAL
Pt stating he sees bugs and mice in his room.  Valium given per pt request.    verbal cues/nonverbal cues (demo/gestures)/1 person assist

## 2023-07-10 NOTE — CHART NOTE - NSCHARTNOTEFT_GEN_A_CORE
7/7: Admitted. Na 115 in ED with repeat 118, started 3% at 30cc/hr, and salt tabs 3q6, stability CTH in ED showing Interval development of a 13 mm hyperdense nodule along the   inferior margin of the resection cavity which may represent progression   of disease with hemorrhage, repeat CTH stable, urine studies ordered  7/8: Neuro stable, 12AM BMP Na114 3% increased to 40cc/hr, urine studies, pancx to r/o infection since pt is immunocompromised. Changed pantoprazole to sucralfate for GI ppx d/t thrombocytopenia. LE dopplers negative. DC'd 3%. ENT consulted to assess stoma, recommend follow up upon discharge with ENT, Repeat sodium 122. Restarted 3% at 30.  7/9: ANA LILIA o/n neuro stable. remains on 3%@30cc/hr. Started florinef, increased 3% to 50cc/hr. Increased 3% to 75cc/hr.    7/10: continued current 3% rate o/n. Na 127 --> 125, cont 3% @75cc/hr, f/u repeat Na this evening, fanny stepdown tomorrow. Pend dispo home with home PT/OT when able. 7/10: continued current 3% rate o/n. Na 127 --> 125, cont 3% @75cc/hr, f/u repeat Na this evening, likely stepdown tomorrow. Pend dispo home with home PT/OT when able.

## 2023-07-10 NOTE — PROGRESS NOTE ADULT - SUBJECTIVE AND OBJECTIVE BOX
=================================  NEUROCRITICAL CARE ATTENDING NOTE  =================================    JORDAN CHAKRABORTY   MRN-[id number]  Summary:  68y/M  with type 2 diabetes, hyperlipidemia, iron deficiency anemia, tracheal stenosis s/p tracheostomy (which was closed by ENT 7 days ago); glioblastoma s/p resection 1/27/2022, and Avastin treatment 3/2023 presenting with altered mental status and weakness x2 days. Per family, they last saw him last night around 6pm when he was at baseline. Patient has intermittent expressive aphasia but is getting worse and now is persistent, and has intermittent nausea and vomiting since last night. Patient was supposed to receive an outpatient brain MRI on 7/25/2023. Stroke code performed in  is negative for CVA. Head CT shows new hyperdensity in frontal parietal - surgical site. MRI is ordered to rule out tumor recurrence.  Per daughter at bedside, Entresto and Eliquis were stopped per his PC - cardiologist.   (07 Jul 2023 13:10)    COURSE IN THE HOSPITAL:  07/07 Admitted to [facility], started on 3%  07/08 37.8 pancultured No significant events overnight.   07/09 No significant events overnight.  07/10      Past Medical History: No pertinent past medical history diabetes mellitus Hypertension  Glioblastoma  Type 2 diabetes mellitus  Hyperlipidemia  Iron deficiency anemia  Nephrolithiasis  Thrombocytopenia  Hyponatremia  BPH (benign prostatic hyperplasia)  Allergies:  amoxicillin (Rash)  Home meds:   ·	aspirin 81 mg oral tablet: 1 tab(s) orally once a day  ·	atorvastatin 20 mg oral tablet: 1 tab(s) orally once a day (at bedtime)  ·	escitalopram 5 mg oral tablet: 1 tab(s) orally once a day  ·	fluticasone 50 mcg/inh nasal spray: 1 spray(s) nasal 2 times a day  ·	Januvia 100 mg oral tablet: 1 tab(s) orally once a day  ·	levETIRAcetam 750 mg oral tablet: 1 tab(s) orally 2 times a day  ·	melatonin 3 mg oral tablet: 2 tab(s) orally once a day (at bedtime)  ·	ondansetron 4 mg oral tablet: 1 tab(s) orally once a day  ·	pantoprazole 40 mg oral delayed release tablet: 1 tab(s) orally once a day (before a meal)  ·	senna (sennosides) 8.6 mg oral tablet: 1 tab(s) orally once a day as needed for  constipation  ·	sodium chloride 1 g oral tablet: 2 tab(s) orally every 6 hours    PHYSICAL EXAMINATION  T(C): 36.4 (07-10 @ 17:58), Max: 36.7 (07-10 @ 01:34) HR: 60 (07-10 @ 17:00) (56 - 69) BP: 141/73 (07-10 @ 17:00) (124/67 - 148/82) RR: 19 (07-10 @ 17:00) (14 - 20) SpO2: 96% (07-10 @ 17:00) (94% - 98%)  NEUROLOGIC EXAMINATION:  Patient is  awake alert orientation x2, JUAN, no facial droop, moving all 4s with good strength  GENERAL: not intubated, not in cardiorespiratory distress  EENT:  anicteric  CARDIOVASCULAR: (+) S1 S2, normal rate and regular rhythm  PULMONARY: clear to auscultation bilaterally  ABDOMEN: soft, nontender with normoactive bowel sounds  EXTREMITIES: no edema  SKIN: no rash    LABS: 07-10  CAPILLARY BLOOD GLUCOSE 142 156 156              (6.80)  10.8 (11.5)   7.21  )-----------( 72 (69)       ( 10 Jul 2023 04:42 )             30.3   (120)   125<L>  |  88<L>  |  8   ----------------------------<  185<H>  3.7   |  26  |  0.58    Ca    8.2<L>      10 Jul 2023 13:07  Phos  2.7     07-10  Mg     1.7     07-10    TPro  6.3  /  Alb  3.2<L>  /  TBili  0.5  /  DBili  x   /  AST  20  /  ALT  10  /  AlkPhos  53  07-10    07-09 @ 07:01  -  07-10 @ 07:00  IN: 3250 mL / OUT: 3275 mL / NET: -25 mL     Bacteriology:  CSF studies:  EEG:  Neuroimaging:  Other imaging:    MEDICATIONS: 07-10    ·	escitalopram 5 Oral daily  ·	levETIRAcetam 750 Oral two times a day  ·	melatonin 5 Oral at bedtime  ·	senna 2 Oral at bedtime  ·	sucralfate 1 Oral every 6 hours  ·	atorvastatin 20 Oral at bedtime  ·	dexAMETHasone     Tablet 4 Oral every 12 hours  ·	fludroCORTISONE 0.2 Oral daily  ·	insulin lispro (ADMELOG) corrective regimen sliding scale  SubCutaneous Before meals and at bedtime  ·	fluticasone propionate 50 MICROgram(s)/spray Nasal Spray 1 Both Nostrils two times a day  ·	sodium chloride 3 Oral every 6 hours  ·	acetaminophen     Tablet .. 650 Oral every 6 hours PRN  ·	ondansetron Injectable 4 IV Push once PRN    IV FLUIDS: 3%@30cc/hr  DRIPS:  DIET: CCD  Lines:  Drains:    Wounds:    CODE STATUS:  Full Code                       GOALS OF CARE:  aggressive                      DISPOSITION:  ICU

## 2023-07-11 NOTE — PROGRESS NOTE ADULT - SUBJECTIVE AND OBJECTIVE BOX
S/Overnight events: ANA LILIA o/n. Remains on 3% @75cc/hr. Decreased 3% to 50cc/hr.      Hospital Course:   7/7: Admitted. Na 115 in ED with repeat 118, started 3% at 30cc/hr, and salt tabs 3q6, stability CTH in ED showing Interval development of a 13 mm hyperdense nodule along the   inferior margin of the resection cavity which may represent progression   of disease with hemorrhage, repeat CTH stable, urine studies ordered  7/8: Neuro stable, 12AM BMP Na114 3% increased to 40cc/hr, urine studies, pancx to r/o infection since pt is immunocompromised. Changed pantoprazole to sucralfate for GI ppx d/t thrombocytopenia. LE dopplers negative. DC'd 3%. ENT consulted to assess stoma, recommend follow up upon discharge with ENT, Repeat sodium 122. Restarted 3% at 30.  7/9: ANA LILIA o/n neuro stable. remains on 3%@30cc/hr. Started florinef, increased 3% to 50cc/hr. Increased 3% to 75cc/hr.    7/10: continued current 3% rate o/n. Na 127 --> 125, cont 3% @75cc/hr, f/u repeat Na this evening, likely stepdown tomorrow. Pend dispo home with home PT/OT when able. Heme consulted for thrombocytopenia  7/11: ANA LILIA o/n. Remains on 3% @75cc/hr. Decreased 3% to 50cc/hr. Plan to stepdown today      Vital Signs Last 24 Hrs  T(C): 36.9 (11 Jul 2023 00:55), Max: 36.9 (11 Jul 2023 00:55)  T(F): 98.5 (11 Jul 2023 00:55), Max: 98.5 (11 Jul 2023 00:55)  HR: 63 (10 Jul 2023 21:00) (58 - 69)  BP: 145/78 (10 Jul 2023 21:00) (124/67 - 145/78)  BP(mean): 106 (10 Jul 2023 21:00) (89 - 106)  RR: 14 (10 Jul 2023 21:00) (14 - 20)  SpO2: 94% (10 Jul 2023 21:00) (94% - 98%)    Parameters below as of 10 Jul 2023 21:00  Patient On (Oxygen Delivery Method): room air        I&O's Detail    09 Jul 2023 07:01  -  10 Jul 2023 07:00  --------------------------------------------------------  IN:    IV PiggyBack: 100 mL    Oral Fluid: 2430 mL    sodium chloride 3%: 120 mL    sodium chloride 3%: 300 mL    sodium chloride 3%: 1050 mL  Total IN: 4000 mL    OUT:    Incontinent per Condom Catheter (mL): 3275 mL  Total OUT: 3275 mL    Total NET: 725 mL      10 Jul 2023 07:01  -  11 Jul 2023 03:18  --------------------------------------------------------  IN:    IV PiggyBack: 100 mL    Oral Fluid: 2520 mL    sodium chloride 3%: 1275 mL  Total IN: 3895 mL    OUT:    Incontinent per Condom Catheter (mL): 4890 mL  Total OUT: 4890 mL    Total NET: -995 mL        I&O's Summary    09 Jul 2023 07:01  -  10 Jul 2023 07:00  --------------------------------------------------------  IN: 4000 mL / OUT: 3275 mL / NET: 725 mL    10 Jul 2023 07:01  -  11 Jul 2023 03:18  --------------------------------------------------------  IN: 3895 mL / OUT: 4890 mL / NET: -995 mL      PHYSICAL EXAM:  Constitutional: awake, alert, sitting up in bed. NAD.   Respiratory: non-labored breathing. Normal chest rise.   Cardiovascular: Regular rate and rhythm  Gastrointestinal:  Soft, nontender, nondistended.  .  Vascular: Extremities warm, no ulcers, no discoloration of skin.   Neurological: Gen: AA&O x 2, conversant, appropriate.      CN II-XII grossly intact.    Motor: DUMONT x 4, 5/5 throughout UE/LE.    Sens: Sensation intact to light touch throughout.    Extremities: warm and well perfused    No pronator drift    TUBES/LINES:  [] CVC  [] A-line  [] Lumbar Drain  [] Ventriculostomy  [] Other    DIET:  [] NPO  [x] Mechanical  [] Tube feeds    LABS:                        10.8   7.21  )-----------( 72       ( 10 Jul 2023 04:42 )             30.3     07-11    128<L>  |  93<L>  |  11  ----------------------------<  146<H>  3.7   |  26  |  0.53    Ca    8.6      11 Jul 2023 02:07  Phos  2.7     07-10  Mg     1.7     07-10    TPro  6.3  /  Alb  3.2<L>  /  TBili  0.5  /  DBili  x   /  AST  20  /  ALT  10  /  AlkPhos  53  07-10      Urinalysis Basic - ( 11 Jul 2023 02:07 )    Color: x / Appearance: x / SG: x / pH: x  Gluc: 146 mg/dL / Ketone: x  / Bili: x / Urobili: x   Blood: x / Protein: x / Nitrite: x   Leuk Esterase: x / RBC: x / WBC x   Sq Epi: x / Non Sq Epi: x / Bacteria: x          CAPILLARY BLOOD GLUCOSE      POCT Blood Glucose.: 166 mg/dL (10 Jul 2023 21:02)  POCT Blood Glucose.: 142 mg/dL (10 Jul 2023 17:04)  POCT Blood Glucose.: 156 mg/dL (10 Jul 2023 12:41)  POCT Blood Glucose.: 156 mg/dL (10 Jul 2023 06:04)      Drug Levels: [] N/A    CSF Analysis: [] N/A      Allergies    amoxicillin (Rash)    Intolerances      MEDICATIONS:  Antibiotics:    Neuro:  acetaminophen     Tablet .. 650 milliGRAM(s) Oral every 6 hours PRN  escitalopram 5 milliGRAM(s) Oral daily  levETIRAcetam 750 milliGRAM(s) Oral two times a day  melatonin 5 milliGRAM(s) Oral at bedtime  ondansetron Injectable 4 milliGRAM(s) IV Push once PRN    Anticoagulation:    OTHER:  atorvastatin 20 milliGRAM(s) Oral at bedtime  chlorhexidine 2% Cloths 1 Application(s) Topical <User Schedule>  dexAMETHasone     Tablet 4 milliGRAM(s) Oral every 12 hours  fludroCORTISONE 0.2 milliGRAM(s) Oral daily  fluticasone propionate 50 MICROgram(s)/spray Nasal Spray 1 Spray(s) Both Nostrils two times a day  glucagon  Injectable 1 milliGRAM(s) IntraMuscular once  insulin lispro (ADMELOG) corrective regimen sliding scale   SubCutaneous Before meals and at bedtime  senna 2 Tablet(s) Oral at bedtime  sucralfate 1 Gram(s) Oral every 6 hours    IVF:  sodium chloride 3 Gram(s) Oral every 6 hours  sodium chloride 3%. 500 milliLiter(s) IV Continuous <Continuous>    CULTURES:  Culture Results:   No growth at 3 days. (07-07 @ 23:47)  Culture Results:   No growth at 3 days. (07-07 @ 23:47)    RADIOLOGY & ADDITIONAL TESTS:      ASSESSMENT:  Pt is a 67YO male with a past medical history of type 2 diabetes, hyperlipidemia, iron deficiency anemia, tracheal stenosis s/p tracheostomy (which was closed by ENT 7 days ago); glioblastoma s/p resection 1/27/2022, and Avastin treatment 3/2023 presenting with expressive aphasia, nuasea and vomiting x1 day. CT shows new hyperdensity in left frontal parietal - surgical site c/f disease progression. Na in the . Admitted to NSICU for further management.     ALTERED MENTAL STATUS;BRAIN MASS    Abnormal electrocardiogram [ECG] [EKG]    Allergy, unspecified, initial encounter    Anemia, unspecified    Benign prostatic hyperplasia without lower urinary tract symptoms    Calculus of kidney    Cough, unspecified    Depression, unspecified    ENCOUNTER FOR ATTENT    Encounter for general adult medical examination without abnormal findings    Encounter for immunization    Encounter for immunization safety counseling    Encounter for other preprocedural examination    Encounter for screening for malignant neoplasm of prostate    Encounter for screening for other metabolic disorders    Encounter for screening for other viral diseases    ESSENTIAL (PRIMARY)    Gastro-esophageal reflux disease without esophagitis    Generalized anxiety disorder    History of Glioblastoma    Hyperlipidemia, unspecified    Hypo-osmolality and hyponatremia    Hypotension, unspecified    Iron deficiency    Iron deficiency anemia, unspecified    LONG TERM (CURRENT)    LONG TERM (CURRENT)    LONG TERM (CURRENT)    Malignant neoplasm of brain, unspecified    Nausea with vomiting, unspecified    Other constipation    Other fatigue    Other injury of unspecified body region, initial encounter    OTHER SPECIFIED DISE    Personal history of malignant neoplasm, unspecified    Personal history of other diseases of the nervous system and sense organs    Personal history of other endocrine, nutritional and metabolic disease    Personal history of other mental and behavioral disorders    Personal history of transient ischemic attack (TIA), and cerebral infarction without residual deficits    POSTPROCEDURAL SUBGL    Pruritus, unspecified    Shortness of breath    Tachycardia, unspecified    Thrombocytopenia, unspecified    Unspecified abdominal pain    Unspecified visual disturbance    Urinary calculus, unspecified    Vitamin D deficiency, unspecified    No pertinent family history in first degree relatives    FH: diabetes mellitus (Father, Mother)    FH: hyperlipidemia (Father)    FH: hypertension (Father, Mother)    Handoff    MEWS Score    No pertinent past medical history    Diabetes mellitus    Hypertension    Glioblastoma    Type 2 diabetes mellitus    Hyperlipidemia    Iron deficiency anemia    Nephrolithiasis    Thrombocytopenia    Hyponatremia    BPH (benign prostatic hyperplasia)    No pertinent past medical history    Altered mental status    Hypertension    Glioblastoma    Type 2 diabetes mellitus    Hyperlipidemia    Iron deficiency anemia    Thrombocytopenia    BPH (benign prostatic hyperplasia)    Hyponatremia    No significant past surgical history    No significant past surgical history    S/P craniotomy    H/O tracheostomy    No significant past surgical history    MED EVAL    Type 2 diabetes mellitus    BPH (benign prostatic hyperplasia)    90+    Brain mass    [email or web address]        PLAN:    Plan:  Neuro:  -neuro q4/vitals q1  -CTH 7/7: Interval development of a 13 mm hyperdense nodule along the   inferior margin of the resection cavity which may represent progression   of disease with hemorrhage. Repeat CTH stable  -CTA 7/7: negative.  -pain control prn  -c/w home Keppra 750mg BID  -Decadron 4mg BID  -c/w home Lexapro  -home ASA 81 held i/s/o thrombocytopenia   -MRI suspicious for recurrent tumor and hydro     Pulm:  -satting well on RA    Cardio:  -SBP<160    GI:  -CCD  -bowel regimen  -sucralfate  -last BM 7/9    Renal:  -hyponatremic, Na 118 on admission  -3% at 50  -florinef 0.2mg daily  -salt tabs 3q6  -voiding    Endo:  -ISS  -f/u A1c  -h/o T2DM: home Januvia held  -h/o HLD: c/w Atorvastatin 20mg    Heme:  -SCDs for DVT ppx, SQL held for thrombocytopenia   -heme consulted for thrombocytopenia, appreciate recs  -LE dopplers 7/8 negative    ID:  [id number]  -pan cx 7/7    Dispo: ICU status, full code, Home PT    D/w Dr. Dorado    Heart Failure: []Acute, [] acute on chronic , []chronic  Heart Failure:  [] Diastolic (HFpEF), [] Systolic (HFrEF), []Combined (HFpEF and HFrEF), [] RHF, [] Pulm HTN, [] Other    [] EUGENIA, [] ATN, [] AIN, [] other  [] CKD1, [] CKD2, [] CKD 3, [] CKD 4, [] CKD 5, []ESRD    Encephalopathy: [] Metabolic, [] Hepatic, [] toxic, [] Neurological, [] Other    Abnormal Nurtitional Status: [] malnurtition (see nutrition note), [ ]underweight: BMI < 19, [] morbid obesity: BMI >40, [] Cachexia    [] Sepsis  [] hypovolemic shock,[] cardiogenic shock, [] hemorrhagic shock, [] neuogenic shock  [] Acute Respiratory Failure  []Cerebral edema, [] Brain compression/ herniation,   [] Functional quadriplegia  [] Acute blood loss anemia

## 2023-07-11 NOTE — PROGRESS NOTE ADULT - ASSESSMENT
67yo /M with:  GBM brain compression cerebral edema   Diabetes Mellitus Hypertension dyslipidemia  iron deficiency anemia  nephrolithiasis  thrombocytopenia  BPH  Severe hyponatremia    PLAN:   NEURO: Neurochecks Q4h, PRN pain meds with acetaminophen  GBM - no neurosurgical plans this admission  Continue steroids  REHAB: Physical therapy evaluation and management    EARLY MOB:   OOB to chair, ambulate    PULM:   Room air, incentive spirometry    CARDIO:    SBP goal 100-150mm Hg    ENDO:    Blood sugar goals 140-180 mg/dL, continue insulin sliding scale    GI: Cont sucralfate  DIET: CCD   LBM: 7/10    RENAL:   On 3% @50cc/hr, recheck BMP Q6  Cont salt tabs  Goal Na 125-133 7/11    HEM/ONC: Thrombocytopenia  VTE Prophylaxis: SCDs, ambulate, no DVT chemoprophylaxis for now as patient is high risk for bleed (thrombocytopenia)  Heme onc following  Monitor CBC    ID: Afebrile, no leukocytosis    MISC:    SOCIAL/FAMILY:  [x] awaiting [] updated at bedside [] family meeting    CODE STATUS:  [x] Full Code [] DNR [] DNI [] Palliative/Comfort Care    DISPOSITION:  [] ICU [] Stroke Unit [x] Floor [] EMU [] RCU [] PCU    ICU stepdown Checklist:    Completed: 07-11 @ 11:25    [X] hemodynamically stable – VS WNL and stable x 24hours, UO adequate  [n/a ] if  previously on HDA - off pressors x 24h with stable neuro exam    [X] no new symptoms x 24h (i.e. new fever, new-onset nausea/vomiting)  [X] stable labs: (i.e. WBC not rising, sodium not dropping)  [X] patient not at high risk for aspiration, if high risk then:                  [ ] should have definitive plans for trach/PEG (alternative option is to discharge from ICU to facilty)                  [ ] stepdown to bed close to nurse’s station  [n/a] low suctioning requirements (i.e. q4h or less)  [X] sign-off from primary RN*  [X] drains do not require ICU level of care  [X] if patient previously agitated or with behavioral issues – controlled   [X] pain controlled

## 2023-07-11 NOTE — PROGRESS NOTE ADULT - SUBJECTIVE AND OBJECTIVE BOX
=================================  NEUROCRITICAL CARE ATTENDING NOTE  =================================    JORDAN CHAKRABORTY   MRN-[id number]  Summary:  68y/M  with type 2 diabetes, hyperlipidemia, iron deficiency anemia, tracheal stenosis s/p tracheostomy (which was closed by ENT 7 days ago); glioblastoma s/p resection 1/27/2022, and Avastin treatment 3/2023 presenting with altered mental status and weakness x2 days. Per family, they last saw him last night around 6pm when he was at baseline. Patient has intermittent expressive aphasia but is getting worse and now is persistent, and has intermittent nausea and vomiting since last night. Patient was supposed to receive an outpatient brain MRI on 7/25/2023. Stroke code performed in  is negative for CVA. Head CT shows new hyperdensity in frontal parietal - surgical site. MRI is ordered to rule out tumor recurrence.  Per daughter at bedside, Entresto and Eliquis were stopped per his PC - cardiologist.   (07 Jul 2023 13:10)    COURSE IN THE HOSPITAL:  07/07 Admitted to [facility], started on 3%  07/08 37.8 pancultured No significant events overnight.   07/09 No significant events overnight.     Past Medical History: No pertinent past medical history diabetes mellitus Hypertension  Glioblastoma  Type 2 diabetes mellitus  Hyperlipidemia  Iron deficiency anemia  Nephrolithiasis  Thrombocytopenia  Hyponatremia  BPH (benign prostatic hyperplasia)  Allergies:  amoxicillin (Rash)  Home meds:   ·	aspirin 81 mg oral tablet: 1 tab(s) orally once a day  ·	atorvastatin 20 mg oral tablet: 1 tab(s) orally once a day (at bedtime)  ·	escitalopram 5 mg oral tablet: 1 tab(s) orally once a day  ·	fluticasone 50 mcg/inh nasal spray: 1 spray(s) nasal 2 times a day  ·	Januvia 100 mg oral tablet: 1 tab(s) orally once a day  ·	levETIRAcetam 750 mg oral tablet: 1 tab(s) orally 2 times a day  ·	melatonin 3 mg oral tablet: 2 tab(s) orally once a day (at bedtime)  ·	ondansetron 4 mg oral tablet: 1 tab(s) orally once a day  ·	pantoprazole 40 mg oral delayed release tablet: 1 tab(s) orally once a day (before a meal)  ·	senna (sennosides) 8.6 mg oral tablet: 1 tab(s) orally once a day as needed for  constipation  ·	sodium chloride 1 g oral tablet: 2 tab(s) orally every 6 hours      ICU Vital Signs Last 24 Hrs  T(C): 37.2 (11 Jul 2023 08:55), Max: 37.2 (11 Jul 2023 08:55)  T(F): 99 (11 Jul 2023 08:55), Max: 99 (11 Jul 2023 08:55)  HR: 60 (11 Jul 2023 09:00) (56 - 69)  BP: 125/67 (11 Jul 2023 09:00) (124/67 - 145/78)  BP(mean): 89 (11 Jul 2023 09:00) (89 - 106)  RR: 18 (11 Jul 2023 09:00) (14 - 19)  SpO2: 98% (11 Jul 2023 09:00) (94% - 98%)      07-10-23 @ 07:01  -  07-11-23 @ 07:00  --------------------------------------------------------  IN: 4570 mL / OUT: 7340 mL / NET: -2770 mL    07-11-23 @ 07:01 - 07-11-23 @ 11:18  --------------------------------------------------------  IN: 175 mL / OUT: 0 mL / NET: 175 mL      PHYSICAL EXAMINATION  NEUROLOGIC EXAMINATION:  Patient is awake alert oriented x3, JUAN, no facial droop, moving all 4s with good strength  GENERAL: Not intubated, not in cardiorespiratory distress  EENT:  Anicteric  CARDIOVASCULAR: (+) S1 S2, normal rate and regular rhythm  PULMONARY: Clear to auscultation bilaterally  ABDOMEN: Soft, nontender with normoactive bowel sounds  EXTREMITIES: No edema  SKIN: No rash      MEDICATIONS:   acetaminophen     Tablet .. 650 milliGRAM(s) Oral every 6 hours PRN  atorvastatin 20 milliGRAM(s) Oral at bedtime  chlorhexidine 2% Cloths 1 Application(s) Topical <User Schedule>  dexAMETHasone     Tablet 4 milliGRAM(s) Oral every 12 hours  escitalopram 5 milliGRAM(s) Oral daily  fludroCORTISONE 0.2 milliGRAM(s) Oral daily  fluticasone propionate 50 MICROgram(s)/spray Nasal Spray 1 Spray(s) Both Nostrils two times a day  glucagon  Injectable 1 milliGRAM(s) IntraMuscular once  insulin lispro (ADMELOG) corrective regimen sliding scale   SubCutaneous Before meals and at bedtime  levETIRAcetam 750 milliGRAM(s) Oral two times a day  melatonin 5 milliGRAM(s) Oral at bedtime  ondansetron Injectable 4 milliGRAM(s) IV Push once PRN  senna 2 Tablet(s) Oral at bedtime  sodium chloride 3 Gram(s) Oral every 6 hours  sodium chloride 3%. 500 milliLiter(s) (50 mL/Hr) IV Continuous <Continuous>  sucralfate 1 Gram(s) Oral every 12 hours    LABS:                       12.7   5.67  )-----------( 114      ( 11 Jul 2023 05:30 )             36.2     07-11    128<L>  |  91<L>  |  10  ----------------------------<  158<H>  3.6   |  28  |  0.60    Ca    8.7      11 Jul 2023 05:30  Phos  3.0     07-11  Mg     1.7     07-11    TPro  6.3  /  Alb  3.2<L>  /  TBili  0.5  /  DBili  x   /  AST  20  /  ALT  10  /  AlkPhos  53  07-10    LIVER FUNCTIONS - ( 10 Jul 2023 11:58 )  Alb: 3.2 g/dL / Pro: 6.3 g/dL / ALK PHOS: 53 U/L / ALT: 10 U/L / AST: 20 U/L / GGT: x

## 2023-07-11 NOTE — PROGRESS NOTE ADULT - ASSESSMENT
68y/M with  severe hyponatremia  GBM brain compression cerebral edema   Diabetes Mellitus Hypertension dyslipidemia  iron deficiency anemia  nephrolithiasis  thrombocytopenia  BPH    PLAN:   NEURO: neurochecks q4h, PRN pain meds with acetaminophen  continue 3%,   GBM - no neurosurgical plans this admission  continue steroids  REHAB:  physical therapy evaluation and management    EARLY MOB:   OOB to chair, ambulate    PULM:  Room air, incentive spirometry  CARDIO:  SBP goal 100-150mm Hg  ENDO:  Blood sugar goals 140-180 mg/dL, continue insulin sliding scale  GI:  cont sucralfate  DIET: CCD   RENAL: cont 3% @75cc/hr, rechecking BMP at 2 a.m., cont salt tabs, fludrocortisone, if Na stable or higher at 3 a.m., decrease dose to 50cc/hr  HEM/ONC: Hb stable  VTE Prophylaxis: SCDs, no DVT chemoprophylaxis for now as patient is high risk for bleed (worsening thrombocytopenia)  ID: afebrile, no leukocytosis  Social: will update family     Active issues:  What's keeping patient in the ICU? severe hyponatremia  What is this patient's dispo plan? stepdown once Na >125    ATTENDING ATTESTATION:  I was physically present for the key portions of the evaluation and management (E/M) service provided.  I agree with the above history, physical and plan, which I have reviewed and edited where appropriate.    Patient at high risk for neurological deterioration or death due to:  ICU delirium, aspiration PNA, DVT / PE.  Critical care time:  I have personally provided 60 minutes of critical care time, excluding time spent on separate procedures.      Plan discussed with RN, house staff. 68y/M with  severe hyponatremia  GBM brain compression cerebral edema   Diabetes Mellitus Hypertension dyslipidemia  iron deficiency anemia  nephrolithiasis  thrombocytopenia  BPH    PLAN:   NEURO: neurochecks q4h, PRN pain meds with acetaminophen  continue 3%,   GBM - no neurosurgical plans this admission  continue steroids  REHAB:  physical therapy evaluation and management    EARLY MOB:   OOB to chair, ambulate    PULM:  Room air, incentive spirometry  CARDIO:  SBP goal 100-150mm Hg  ENDO:  Blood sugar goals 140-180 mg/dL, continue insulin sliding scale  GI:  cont sucralfate  DIET: CCD   RENAL: cont 3% @75cc/hr, rechecking BMP at 2 a.m., cont salt tabs, fludrocortisone, if Na stable or higher at 3 a.m., decrease dose to 50cc/hr  HEM/ONC: Hb stable  VTE Prophylaxis: SCDs, no DVT chemoprophylaxis for now as patient is high risk for bleed (worsening thrombocytopenia)  ID: afebrile, no leukocytosis  Social: will update family     Active issues:  What's keeping patient in the ICU? severe hyponatremia  What is this patient's dispo plan? stepdown once Na >125    Nonbillable, nocritical care hours

## 2023-07-11 NOTE — PROGRESS NOTE ADULT - SUBJECTIVE AND OBJECTIVE BOX
=================================  NEUROCRITICAL CARE ATTENDING NOTE  =================================    JORDAN CHAKRABORTY   MRN-[id number]  Summary:  68y/M  with type 2 diabetes, hyperlipidemia, iron deficiency anemia, tracheal stenosis s/p tracheostomy (which was closed by ENT 7 days ago); glioblastoma s/p resection 1/27/2022, and Avastin treatment 3/2023 presenting with altered mental status and weakness x2 days. Per family, they last saw him last night around 6pm when he was at baseline. Patient has intermittent expressive aphasia but is getting worse and now is persistent, and has intermittent nausea and vomiting since last night. Patient was supposed to receive an outpatient brain MRI on 7/25/2023. Stroke code performed in  is negative for CVA. Head CT shows new hyperdensity in frontal parietal - surgical site. MRI is ordered to rule out tumor recurrence.  Per daughter at bedside, Entresto and Eliquis were stopped per his PC - cardiologist.   (07 Jul 2023 13:10)    COURSE IN THE HOSPITAL:  07/07 Admitted to [facility], started on 3%  07/08 37.8 pancultured No significant events overnight.   07/09 No significant events overnight.  07/10    07/11     Past Medical History: No pertinent past medical history diabetes mellitus Hypertension  Glioblastoma  Type 2 diabetes mellitus  Hyperlipidemia  Iron deficiency anemia  Nephrolithiasis  Thrombocytopenia  Hyponatremia  BPH (benign prostatic hyperplasia)  Allergies:  amoxicillin (Rash)  Home meds:   ·	aspirin 81 mg oral tablet: 1 tab(s) orally once a day  ·	atorvastatin 20 mg oral tablet: 1 tab(s) orally once a day (at bedtime)  ·	escitalopram 5 mg oral tablet: 1 tab(s) orally once a day  ·	fluticasone 50 mcg/inh nasal spray: 1 spray(s) nasal 2 times a day  ·	Januvia 100 mg oral tablet: 1 tab(s) orally once a day  ·	levETIRAcetam 750 mg oral tablet: 1 tab(s) orally 2 times a day  ·	melatonin 3 mg oral tablet: 2 tab(s) orally once a day (at bedtime)  ·	ondansetron 4 mg oral tablet: 1 tab(s) orally once a day  ·	pantoprazole 40 mg oral delayed release tablet: 1 tab(s) orally once a day (before a meal)  ·	senna (sennosides) 8.6 mg oral tablet: 1 tab(s) orally once a day as needed for  constipation  ·	sodium chloride 1 g oral tablet: 2 tab(s) orally every 6 hours    PHYSICAL EXAMINATION  T(C): 36.4 (07-11 @ 21:59), Max: 37.2 (07-11 @ 08:55) HR: 55 (07-12 @ 00:00) (55 - 68) BP: 160/78 (07-12 @ 00:00) (125/62 - 160/78) RR: 16 (07-12 @ 00:00) (14 - 19) SpO2: 96% (07-12 @ 00:00) (95% - 98%)   NEUROLOGIC EXAMINATION:  Patient is  awake alert orientation x2, JUAN, no facial droop, moving all 4s with good strength  GENERAL: not intubated, not in cardiorespiratory distress  EENT:  anicteric  CARDIOVASCULAR: (+) S1 S2, bradycardic rate and regular rhythm  PULMONARY: clear to auscultation bilaterally  ABDOMEN: soft, nontender with normoactive bowel sounds  EXTREMITIES: no edema  SKIN: no rash    LABS: 07-12  CAPILLARY BLOOD GLUCOSE 205 146 157 147               12.7   5.67  )-----------( 114      ( 11 Jul 2023 05:30 )             36.2     127<L>  |  94<L>  |  12  ----------------------------<  192<H>  3.4<L>   |  24  |  0.67    Ca    8.4      11 Jul 2023 21:35  Phos  3.0     07-11  Mg     1.7     07-11    TPro  6.3  /  Alb  3.2<L>  /  TBili  0.5  /  DBili  x   /  AST  20  /  ALT  10  /  AlkPhos  53  07-10    07-10 @ 07:01  -  07-11 @ 07:00  IN: 4570 mL / OUT: 7340 mL / NET: -2770 mL     Bacteriology:  CSF studies:  EEG:  Neuroimaging:  Other imaging:    MEDICATIONS: 07-12    ·	escitalopram 5 Oral daily  ·	levETIRAcetam 750 Oral two times a day  ·	melatonin 5 Oral at bedtime  ·	senna 2 Oral at bedtime  ·	sucralfate 1 Oral every 12 hours  ·	atorvastatin 20 Oral at bedtime  ·	dexAMETHasone     Tablet 4 Oral every 12 hours  ·	fludroCORTISONE 0.2 Oral daily  ·	insulin lispro (ADMELOG) corrective regimen sliding scale  SubCutaneous Before meals and at bedtime  ·	fluticasone propionate 50 MICROgram(s)/spray Nasal Spray 1 Both Nostrils two times a day  ·	sodium chloride 3 Oral every 6 hours  ·	acetaminophen     Tablet .. 650 Oral every 6 hours PRN  ·	ondansetron Injectable 4 IV Push once PRN    IV FLUIDS: 3%@30cc/hr  DRIPS:  DIET: CCD  Lines:  Drains:    Wounds:    CODE STATUS:  Full Code                       GOALS OF CARE:  aggressive                      DISPOSITION:  ICU

## 2023-07-12 NOTE — PROGRESS NOTE ADULT - SUBJECTIVE AND OBJECTIVE BOX
HPI:  Osiel Norwood is a 67YO male with a past medical history of type 2 diabetes, hyperlipidemia, iron deficiency anemia, tracheal stenosis s/p tracheostomy (which was closed by ENT 7 days ago); glioblastoma s/p resection 1/27/2022, and Avastin treatment 3/2023 presenting with altered mental status and weakness x2 days. Per family, they last saw him last night around 6pm when he was at baseline. Patient has intermittent expressive aphasia but is getting worse and now is persistent, and has intermittent nausea and vomiting since last night. Patient was supposed to receive an outpatient brain MRI on 7/25/2023.  Stroke code performed in  is negative for CVA. Head CT shows new hyperdensity in frontal parietal - surgical site. MRI is ordered to rule out tumor recurrence.   Per daughter at bedside, Entresto and Eliquis were stopped per his PC - cardiologist.      (07 Jul 2023 13:10)    OVERNIGHT EVENTS: ANA LILIA overnight, neuro stable, remains on 3% at 75cc/hr    HOSPITAL COURSE:  7/7: Admitted. Na 115 in ED with repeat 118, started 3% at 30cc/hr, and salt tabs 3q6, stability CTH in ED showing Interval development of a 13 mm hyperdense nodule along the   inferior margin of the resection cavity which may represent progression   of disease with hemorrhage, repeat CTH stable, urine studies ordered  7/8: Neuro stable, 12AM BMP Na114 3% increased to 40cc/hr, urine studies, pancx to r/o infection since pt is immunocompromised. Changed pantoprazole to sucralfate for GI ppx d/t thrombocytopenia. LE dopplers negative. DC'd 3%. ENT consulted to assess stoma, recommend follow up upon discharge with ENT, Repeat sodium 122. Restarted 3% at 30.  7/9: ANA LILIA o/n neuro stable. remains on 3%@30cc/hr. Started florinef, increased 3% to 50cc/hr. Increased 3% to 75cc/hr.    7/10: continued current 3% rate o/n. Na 127 --> 125, cont 3% @75cc/hr, f/u repeat Na this evening, likely stepdown tomorrow. Pend dispo home with home PT/OT when able. Heme consulted for thrombocytopenia  7/11: ANA LILIA o/n. Remains on 3% @75cc/hr. Decreased 3% to 50cc/hr. Started 1.2L fluid restriction. Repeat Na corrected 128  7/12: ANA LILIA overnight, remains on 3%, pending possible step down today.     Vital Signs Last 24 Hrs  T(C): 36.7 (12 Jul 2023 00:38), Max: 37.2 (11 Jul 2023 08:55)  T(F): 98 (12 Jul 2023 00:38), Max: 99 (11 Jul 2023 08:55)  HR: 57 (12 Jul 2023 03:00) (55 - 68)  BP: 138/74 (12 Jul 2023 03:00) (125/67 - 160/78)  BP(mean): 99 (12 Jul 2023 03:00) (89 - 112)  RR: 16 (12 Jul 2023 03:00) (14 - 19)  SpO2: 96% (12 Jul 2023 03:00) (96% - 98%)    Parameters below as of 12 Jul 2023 03:00  Patient On (Oxygen Delivery Method): room air        I&O's Summary    10 Jul 2023 07:01  -  11 Jul 2023 07:00  --------------------------------------------------------  IN: 4570 mL / OUT: 7340 mL / NET: -2770 mL    11 Jul 2023 07:01  -  12 Jul 2023 03:14  --------------------------------------------------------  IN: 2345 mL / OUT: 3100 mL / NET: -755 mL        PHYSICAL EXAM:  General: NAD, pt is comfortably sitting up in bed, on room air  HEENT: CN II-XII grossly intact, PERRL 3mm briskly reactive, EOMI b/l, face symmetric, tongue midline, neck FROM  Cardiovascular: RRR, normal S1 and S2   Respiratory: lungs CTAB, no wheezing, rhonchi, or crackles   GI: normoactive BS to auscultation, abd soft, NTND   Neuro: A&Ox2, No aphasia, speech clear, no dysmetria, no pronator drift. Follows commands.  DUMONT x4 spontaneously, 5/5 strength in all extremities throughout. SILT throughout   Extremities: warm and well perfused     TUBES/LINES:  [] Campuzano  [] Lumbar Drain  [] Wound Drains  [] Others      DIET:  [] NPO  [x] Mechanical  [] Tube feeds    LABS:                        12.7   5.67  )-----------( 114      ( 11 Jul 2023 05:30 )             36.2     07-11    127<L>  |  94<L>  |  12  ----------------------------<  192<H>  3.4<L>   |  24  |  0.67    Ca    8.4      11 Jul 2023 21:35  Phos  3.0     07-11  Mg     1.7     07-11    TPro  6.3  /  Alb  3.2<L>  /  TBili  0.5  /  DBili  x   /  AST  20  /  ALT  10  /  AlkPhos  53  07-10    PT/INR - ( 11 Jul 2023 05:30 )   PT: 12.8 sec;   INR: 1.07          PTT - ( 11 Jul 2023 05:30 )  PTT:23.7 sec  Urinalysis Basic - ( 11 Jul 2023 21:35 )    Color: x / Appearance: x / SG: x / pH: x  Gluc: 192 mg/dL / Ketone: x  / Bili: x / Urobili: x   Blood: x / Protein: x / Nitrite: x   Leuk Esterase: x / RBC: x / WBC x   Sq Epi: x / Non Sq Epi: x / Bacteria: x          CAPILLARY BLOOD GLUCOSE      POCT Blood Glucose.: 205 mg/dL (11 Jul 2023 21:01)  POCT Blood Glucose.: 146 mg/dL (11 Jul 2023 16:55)  POCT Blood Glucose.: 157 mg/dL (11 Jul 2023 12:20)  POCT Blood Glucose.: 147 mg/dL (11 Jul 2023 07:10)      Drug Levels: [] N/A    CSF Analysis: [] N/A      Allergies    amoxicillin (Rash)    Intolerances      MEDICATIONS:  Antibiotics:    Neuro:  acetaminophen     Tablet .. 650 milliGRAM(s) Oral every 6 hours PRN  escitalopram 5 milliGRAM(s) Oral daily  levETIRAcetam 750 milliGRAM(s) Oral two times a day  melatonin 5 milliGRAM(s) Oral at bedtime  ondansetron Injectable 4 milliGRAM(s) IV Push once PRN    Anticoagulation:    OTHER:  atorvastatin 20 milliGRAM(s) Oral at bedtime  chlorhexidine 2% Cloths 1 Application(s) Topical <User Schedule>  dexAMETHasone     Tablet 4 milliGRAM(s) Oral every 12 hours  fludroCORTISONE 0.2 milliGRAM(s) Oral daily  fluticasone propionate 50 MICROgram(s)/spray Nasal Spray 1 Spray(s) Both Nostrils two times a day  glucagon  Injectable 1 milliGRAM(s) IntraMuscular once  insulin lispro (ADMELOG) corrective regimen sliding scale   SubCutaneous Before meals and at bedtime  senna 2 Tablet(s) Oral at bedtime  sucralfate 1 Gram(s) Oral every 12 hours    IVF:  sodium chloride 3 Gram(s) Oral every 6 hours  sodium chloride 3%. 500 milliLiter(s) IV Continuous <Continuous>    CULTURES:  Culture Results:   No growth at 4 days. (07-07 @ 23:47)  Culture Results:   No growth at 4 days. (07-07 @ 23:47)    RADIOLOGY & ADDITIONAL TESTS:      ASSESSMENT:  Pt is a 67YO male with a past medical history of type 2 diabetes, hyperlipidemia, iron deficiency anemia, tracheal stenosis s/p tracheostomy (which was closed by ENT 7 days ago); glioblastoma s/p resection 1/27/2022, and Avastin treatment 3/2023 presenting with expressive aphasia, nuasea and vomiting x1 day. CT shows new hyperdensity in left frontal parietal - surgical site c/f disease progression. Na in the . Admitted to NSICU for further management.       ALTERED MENTAL STATUS;BRAIN MASS    Abnormal electrocardiogram [ECG] [EKG]    Allergy, unspecified, initial encounter    Anemia, unspecified    Benign prostatic hyperplasia without lower urinary tract symptoms    Calculus of kidney    Cough, unspecified    Depression, unspecified    ENCOUNTER FOR ATTENT    Encounter for general adult medical examination without abnormal findings    Encounter for immunization    Encounter for immunization safety counseling    Encounter for other preprocedural examination    Encounter for screening for malignant neoplasm of prostate    Encounter for screening for other metabolic disorders    Encounter for screening for other viral diseases    ESSENTIAL (PRIMARY)    Gastro-esophageal reflux disease without esophagitis    Generalized anxiety disorder    History of Glioblastoma    Hyperlipidemia, unspecified    Hypo-osmolality and hyponatremia    Hypotension, unspecified    Iron deficiency    Iron deficiency anemia, unspecified    LONG TERM (CURRENT)    LONG TERM (CURRENT)    LONG TERM (CURRENT)    Malignant neoplasm of brain, unspecified    Nausea with vomiting, unspecified    Other constipation    Other fatigue    Other injury of unspecified body region, initial encounter    OTHER SPECIFIED DISE    Personal history of malignant neoplasm, unspecified    Personal history of other diseases of the nervous system and sense organs    Personal history of other endocrine, nutritional and metabolic disease    Personal history of other mental and behavioral disorders    Personal history of transient ischemic attack (TIA), and cerebral infarction without residual deficits    POSTPROCEDURAL SUBGL    Pruritus, unspecified    Shortness of breath    Tachycardia, unspecified    Thrombocytopenia, unspecified    Unspecified abdominal pain    Unspecified visual disturbance    Urinary calculus, unspecified    Vitamin D deficiency, unspecified    No pertinent family history in first degree relatives    FH: diabetes mellitus (Father, Mother)    FH: hyperlipidemia (Father)    FH: hypertension (Father, Mother)    Handoff    MEWS Score    No pertinent past medical history    Diabetes mellitus    Hypertension    Glioblastoma    Type 2 diabetes mellitus    Hyperlipidemia    Iron deficiency anemia    Nephrolithiasis    Thrombocytopenia    Hyponatremia    BPH (benign prostatic hyperplasia)    No pertinent past medical history    Altered mental status    Hypertension    Glioblastoma    Type 2 diabetes mellitus    Hyperlipidemia    Iron deficiency anemia    Thrombocytopenia    BPH (benign prostatic hyperplasia)    Hyponatremia    No significant past surgical history    No significant past surgical history    S/P craniotomy    H/O tracheostomy    No significant past surgical history    MED EVAL    Type 2 diabetes mellitus    BPH (benign prostatic hyperplasia)    90+    Brain mass    [email or web address]        PLAN:  Neuro:  -neuro q4/vitals q4hrs   -CTH 7/7: Interval development of a 13 mm hyperdense nodule along the   inferior margin of the resection cavity which may represent progression   of disease with hemorrhage. Repeat CTH stable  -CTA 7/7: negative.  -pain control prn  -c/w home Keppra 750mg BID  -Decadron 4mg BID  -c/w home Lexapro  -home ASA 81 held i/s/o thrombocytopenia   -MRI suspicious for recurrent tumor and hydro     Pulm:  -satting well on RA    Cardio:  -SBP<160    GI:  -CCD with 1.2L fluid restriction  -bowel regimen  -sucralfate  -last BM 7/10     Renal:  -hyponatremic, Na 118 on admission  -1.2L fluid restriction started 7/11  -3% at 75cc/hr  -florinef 0.2mg daily, salt tabs 3q6  -voiding    Endo:  -ISS  - A1c 5.7  -h/o T2DM: home Januvia held  -h/o HLD: c/w Atorvastatin 20mg    Heme:  -SCDs for DVT ppx, SQL held for thrombocytopenia   -heme consulted for thrombocytopenia, appreciate recs  -LE dopplers 7/8 negative    ID:  -afebrile  -pan cx 7/7    Dispo: SDU status, full code, Home PT    D/w Dr. Dorado and Dr. Monsalve  Assessment:  Present when checked    []  GCS  E   V  M     Heart Failure: []Acute, [] acute on chronic , []chronic  Heart Failure:  [] Diastolic (HFpEF), [] Systolic (HFrEF), []Combined (HFpEF and HFrEF), [] RHF, [] Pulm HTN, [] Other    [] EUGENIA, [] ATN, [] AIN, [] other  [] CKD1, [] CKD2, [] CKD 3, [] CKD 4, [] CKD 5, []ESRD    Encephalopathy: [] Metabolic, [] Hepatic, [] toxic, [] Neurological, [] Other    Abnormal Nurtitional Status: [] malnurtition (see nutrition note), [ ]underweight: BMI < 19, [] morbid obesity: BMI >40, [] Cachexia    [] Sepsis  [] hypovolemic shock,[] cardiogenic shock, [] hemorrhagic shock, [] neuogenic shock  [] Acute Respiratory Failure  []Cerebral edema, [] Brain compression/ herniation,   [] Functional quadriplegia  [] Acute blood loss anemia

## 2023-07-12 NOTE — PROGRESS NOTE ADULT - ASSESSMENT
69 y/o /M with:  GBM brain compression cerebral edema   Diabetes Mellitus Hypertension dyslipidemia  iron deficiency anemia  nephrolithiasis  thrombocytopenia  BPH  Severe hyponatremia    PLAN:   NEURO: Neurochecks Q4h, PRN pain meds with acetaminophen  GBM - no neurosurgical plans this admission  Continue steroids  REHAB: Physical therapy evaluation and management    EARLY MOB:   OOB to chair, ambulate    PULM:   Room air, incentive spirometry    CARDIO:    SBP goal 100-150mm Hg    ENDO:    Blood sugar goals 140-180 mg/dL, continue insulin sliding scale    GI: Cont sucralfate  DIET: CCD   LBM: 7/10    RENAL:   On 3% @50cc/hr, recheck BMP Q6  Cont salt tabs  Goal Na 125-133 7/11    HEM/ONC: Thrombocytopenia  VTE Prophylaxis: SCDs, ambulate, no DVT chemoprophylaxis for now as patient is high risk for bleed (thrombocytopenia)  Heme onc following  Monitor CBC    ID: Afebrile, no leukocytosis    MISC:    SOCIAL/FAMILY:  [x] awaiting [] updated at bedside [] family meeting    CODE STATUS:  [x] Full Code [] DNR [] DNI [] Palliative/Comfort Care    DISPOSITION:  [] ICU [] Stroke Unit [x] Floor [] EMU [] RCU [] PCU    ICU stepdown Checklist:    Completed: 07-11 @ 11:25    [X] hemodynamically stable – VS WNL and stable x 24hours, UO adequate  [n/a ] if  previously on HDA - off pressors x 24h with stable neuro exam    [X] no new symptoms x 24h (i.e. new fever, new-onset nausea/vomiting)  [X] stable labs: (i.e. WBC not rising, sodium not dropping)  [X] patient not at high risk for aspiration, if high risk then:                  [ ] should have definitive plans for trach/PEG (alternative option is to discharge from ICU to facilty)                  [ ] stepdown to bed close to nurse’s station  [n/a] low suctioning requirements (i.e. q4h or less)  [X] sign-off from primary RN*  [X] drains do not require ICU level of care  [X] if patient previously agitated or with behavioral issues – controlled   [X] pain controlled   69 y/o /M with:  GBM brain compression cerebral edema   Diabetes Mellitus Hypertension dyslipidemia  Iron deficiency anemia  Nephrolithiasis  Thrombocytopenia  BPH  Severe hyponatremia    PLAN:   NEURO: Neurochecks Q4h, PRN pain meds with acetaminophen  GBM - no neurosurgical plans this admission  Continue steroids  REHAB: Physical therapy evaluation and management    EARLY MOB:   OOB to chair, ambulate    PULM:   Room air, incentive spirometry    CARDIO:    SBP goal 100-150mm Hg    ENDO:    Blood sugar goals 140-180 mg/dL, continue insulin sliding scale    GI:   Cont sucralfate  DIET: CCD   LBM: 7/10    RENAL:   On 3% @50cc/hr, recheck BMP Q6  Cont salt tabs, fluid restriction  Goal Na 128-136 7/12. Currently 131    HEM/ONC: Thrombocytopenia  VTE Prophylaxis: SCDs, ambulate, no DVT chemoprophylaxis for now as patient is high risk for bleed (thrombocytopenia)  Heme onc following  Monitor CBC    ID: Afebrile, no leukocytosis    MISC:    SOCIAL/FAMILY:  [x] awaiting [] updated at bedside [] family meeting    CODE STATUS:  [x] Full Code [] DNR [] DNI [] Palliative/Comfort Care    DISPOSITION:  [] ICU [] Stroke Unit [x] Floor [] EMU [] RCU [] PCU    ICU stepdown Checklist:    Completed: 07-11 @ 11:25    [X] hemodynamically stable – VS WNL and stable x 24hours, UO adequate  [n/a ] if  previously on HDA - off pressors x 24h with stable neuro exam    [X] no new symptoms x 24h (i.e. new fever, new-onset nausea/vomiting)  [X] stable labs: (i.e. WBC not rising, sodium not dropping)  [X] patient not at high risk for aspiration, if high risk then:                  [ ] should have definitive plans for trach/PEG (alternative option is to discharge from ICU to facilty)                  [ ] stepdown to bed close to nurse’s station  [n/a] low suctioning requirements (i.e. q4h or less)  [X] sign-off from primary RN*  [X] drains do not require ICU level of care  [X] if patient previously agitated or with behavioral issues – controlled   [X] pain controlled

## 2023-07-12 NOTE — PROGRESS NOTE ADULT - SUBJECTIVE AND OBJECTIVE BOX
=================================  NEUROCRITICAL CARE ATTENDING NOTE  =================================    JORDAN CHAKRABORTY   MRN-[id number]  Summary:  68y/M  with type 2 diabetes, hyperlipidemia, iron deficiency anemia, tracheal stenosis s/p tracheostomy (which was closed by ENT 7 days ago); glioblastoma s/p resection 1/27/2022, and Avastin treatment 3/2023 presenting with altered mental status and weakness x2 days. Per family, they last saw him last night around 6pm when he was at baseline. Patient has intermittent expressive aphasia but is getting worse and now is persistent, and has intermittent nausea and vomiting since last night. Patient was supposed to receive an outpatient brain MRI on 7/25/2023. Stroke code performed in  is negative for CVA. Head CT shows new hyperdensity in frontal parietal - surgical site. MRI is ordered to rule out tumor recurrence.  Per daughter at bedside, Entresto and Eliquis were stopped per his PC - cardiologist.   (07 Jul 2023 13:10)    COURSE IN THE HOSPITAL:  07/07 Admitted to [facility], started on 3%  07/08 37.8 pancultured No significant events overnight.   07/09 No significant events overnight.     Past Medical History: No pertinent past medical history diabetes mellitus Hypertension  Glioblastoma  Type 2 diabetes mellitus  Hyperlipidemia  Iron deficiency anemia  Nephrolithiasis  Thrombocytopenia  Hyponatremia  BPH (benign prostatic hyperplasia)  Allergies:  amoxicillin (Rash)  Home meds:   ·	aspirin 81 mg oral tablet: 1 tab(s) orally once a day  ·	atorvastatin 20 mg oral tablet: 1 tab(s) orally once a day (at bedtime)  ·	escitalopram 5 mg oral tablet: 1 tab(s) orally once a day  ·	fluticasone 50 mcg/inh nasal spray: 1 spray(s) nasal 2 times a day  ·	Januvia 100 mg oral tablet: 1 tab(s) orally once a day  ·	levETIRAcetam 750 mg oral tablet: 1 tab(s) orally 2 times a day  ·	melatonin 3 mg oral tablet: 2 tab(s) orally once a day (at bedtime)  ·	ondansetron 4 mg oral tablet: 1 tab(s) orally once a day  ·	pantoprazole 40 mg oral delayed release tablet: 1 tab(s) orally once a day (before a meal)  ·	senna (sennosides) 8.6 mg oral tablet: 1 tab(s) orally once a day as needed for  constipation  ·	sodium chloride 1 g oral tablet: 2 tab(s) orally every 6 hours      ICU Vital Signs Last 24 Hrs  T(C): 36.7 (12 Jul 2023 05:53), Max: 37.2 (11 Jul 2023 08:55)  T(F): 98 (12 Jul 2023 05:53), Max: 99 (11 Jul 2023 08:55)  HR: 56 (12 Jul 2023 07:00) (55 - 68)  BP: 151/58 (12 Jul 2023 07:00) (125/67 - 160/78)  BP(mean): 84 (12 Jul 2023 07:00) (84 - 112)  RR: 16 (12 Jul 2023 07:00) (16 - 19)  SpO2: 95% (12 Jul 2023 07:00) (95% - 98%)      07-11-23 @ 07:01  -  07-12-23 @ 07:00  --------------------------------------------------------  IN: 2845 mL / OUT: 3450 mL / NET: -605 mL    07-12-23 @ 07:01  -  07-12-23 @ 08:19  --------------------------------------------------------  IN: 0 mL / OUT: 350 mL / NET: -350 mL      PHYSICAL EXAMINATION  NEUROLOGIC EXAMINATION:  Patient is awake alert oriented x3, JUAN, no facial droop, moving all 4s with good strength  GENERAL: Not intubated, not in cardiorespiratory distress  EENT:  Anicteric  CARDIOVASCULAR: (+) S1 S2, normal rate and regular rhythm  PULMONARY: Clear to auscultation bilaterally  ABDOMEN: Soft, nontender with normoactive bowel sounds  EXTREMITIES: No edema  SKIN: No rash    MEDICATIONS:   acetaminophen     Tablet .. 650 milliGRAM(s) Oral every 6 hours PRN  atorvastatin 20 milliGRAM(s) Oral at bedtime  chlorhexidine 2% Cloths 1 Application(s) Topical <User Schedule>  dexAMETHasone     Tablet 4 milliGRAM(s) Oral every 12 hours  escitalopram 5 milliGRAM(s) Oral daily  fludroCORTISONE 0.2 milliGRAM(s) Oral daily  fluticasone propionate 50 MICROgram(s)/spray Nasal Spray 1 Spray(s) Both Nostrils two times a day  glucagon  Injectable 1 milliGRAM(s) IntraMuscular once  insulin lispro (ADMELOG) corrective regimen sliding scale   SubCutaneous Before meals and at bedtime  levETIRAcetam 750 milliGRAM(s) Oral two times a day  melatonin 5 milliGRAM(s) Oral at bedtime  ondansetron Injectable 4 milliGRAM(s) IV Push once PRN  senna 2 Tablet(s) Oral at bedtime  sodium chloride 3 Gram(s) Oral every 6 hours  sodium chloride 3%. 500 milliLiter(s) (50 mL/Hr) IV Continuous <Continuous>  sucralfate 1 Gram(s) Oral every 12 hours    LABS:                       12.3   6.53  )-----------( 91       ( 12 Jul 2023 04:09 )             35.6     07-12    131<L>  |  97  |  10  ----------------------------<  146<H>  4.0   |  26  |  0.58    Ca    8.7      12 Jul 2023 04:09  Phos  2.7     07-12  Mg     1.8     07-12    TPro  6.3  /  Alb  3.2<L>  /  TBili  0.5  /  DBili  x   /  AST  20  /  ALT  10  /  AlkPhos  53  07-10    LIVER FUNCTIONS - ( 10 Jul 2023 11:58 )  Alb: 3.2 g/dL / Pro: 6.3 g/dL / ALK PHOS: 53 U/L / ALT: 10 U/L / AST: 20 U/L / GGT: x

## 2023-07-13 NOTE — DISCHARGE NOTE PROVIDER - NSDCFUADDAPPT_GEN_ALL_CORE_FT
Please follow up with Dr. Dorado    Please follow up with your primary care doctor  Please follow up with Dr. Dorado    Please follow up with Dr. Joseph from Hematology Oncology     Please follow up with Dr. Caba from ENT outpatient     Please follow up with your primary care doctor  Please follow up with Dr. Dorado on 7/24/23 at 12:30PM, call the office to confirm appointment at [phone]    Please follow up with Dr. Joseph from Hematology for management of thrombocytopenia (low platelets)     Please follow up with Dr. Caba from ENT outpatient for management of tracheal stenosis     Please follow up with your primary care doctor, Dr. Mitchell this week, the office will be calling you to schedule a follow up visit.

## 2023-07-13 NOTE — PROGRESS NOTE ADULT - ASSESSMENT
68M with pmhx of T2DM, HLD, JAGDISH, hyponatremia, tracheal stenosis s/p tracheostomy, and glioblastoma s/p cerebral angiogram with IA Avastin treatment in 3/2023. Hematology was consulted for thrombocytopenia during that admission when his platelets hit a flores with 63k, and he was found to have an acute R intramuscular DVT. He has now been readmitted with worsened expressive aphasia, nausea and vomiting with imaging findings concerning for tumor recurrence at surgical site.    #grade II Thrombocytopenia, Plt flores 69k, now improved at 114k  Pt has had a history of thrombocytopenia with his baseline range  since the latter half of last year.   He was previously on temozolomide and RT (completed treatment in 12/2022), which coincides with his noted thrombocytopenia. He received Avastin on 3/7, and had a flores of 63k. He hasn't received any Avastin on this admission and his flores now is 69k  Peripheral slide reviewed - rare schistocytes 0-1/hpf, No evidence of platelet clumping; many acanthocytes/echinocytes observed   Plasmic score 3 pts, low risk of TTP  4Ts score 2 pts, low probability of HIT  No other new medications were started other than florinef, and this is not known to cause DITP.  No clinical signs of DIC on exam, and his labs support this.  Transfuse 1U plt if bleeding and platelet count <50k, if febrile and platelet count <20k,   Dopplers 7/8/23 negative for DVT    #anemia, hemoglobin 10.6  Pt previously found to be iron deficient and given iron sucrose 300mg x 3 in 3/2023    Pt's thrombocytopenia likely due to past treatment with temozolomide. Hepatitis serologies were reviewed, indicative of prior infection. Upon speaking with son-in-law at bedside, it came to light that patient has known "liver scarring" from prior hepatitis exposure.      To be discussed with Dr. Joseph

## 2023-07-13 NOTE — PROGRESS NOTE ADULT - SUBJECTIVE AND OBJECTIVE BOX
INTERVAL HPI/OVERNIGHT EVENTS:    SUBJECTIVE: Patient seen and examined at bedside.     VITAL SIGNS:  ICU Vital Signs Last 24 Hrs  T(C): 36.4 (13 Jul 2023 14:02), Max: 36.6 (12 Jul 2023 18:20)  T(F): 97.6 (13 Jul 2023 14:02), Max: 97.8 (12 Jul 2023 18:20)  HR: 58 (13 Jul 2023 11:41) (58 - 64)  BP: 145/92 (13 Jul 2023 11:41) (133/70 - 146/78)  BP(mean): 112 (13 Jul 2023 11:41) (96 - 112)  ABP: --  ABP(mean): --  RR: 16 (13 Jul 2023 11:41) (16 - 18)  SpO2: 95% (13 Jul 2023 11:41) (94% - 97%)    O2 Parameters below as of 13 Jul 2023 11:41  Patient On (Oxygen Delivery Method): room air              07-12 @ 07:01  -  07-13 @ 07:00  --------------------------------------------------------  IN: 1405 mL / OUT: 2900 mL / NET: -1495 mL    07-13 @ 07:01  -  07-13 @ 14:05  --------------------------------------------------------  IN: 400 mL / OUT: 1000 mL / NET: -600 mL      CAPILLARY BLOOD GLUCOSE      POCT Blood Glucose.: 206 mg/dL (13 Jul 2023 12:15)      PHYSICAL EXAM:    Constitutional: NAD  HEENT: NC/AT; PERRL, anicteric sclera; MMM  Neck: supple, no JVD  Cardiovascular: RRR  Respiratory: CTA B/L, no W/R/R  Gastrointestinal: abdomen soft, NT/ND; no rebound or guarding; +BSx4  Extremities: WWP; no LE edema; no clubbing or cyanosis  Vascular: 2+ radial, DP/PT pulses B/L  Dermatologic: normal color and turgor; no visible rashes  Neurological: AAOx1-2    MEDICATIONS:  MEDICATIONS  (STANDING):  atorvastatin 20 milliGRAM(s) Oral at bedtime  chlorhexidine 2% Cloths 1 Application(s) Topical <User Schedule>  dexAMETHasone     Tablet 4 milliGRAM(s) Oral every 12 hours  escitalopram 5 milliGRAM(s) Oral daily  fludroCORTISONE 0.2 milliGRAM(s) Oral daily  fluticasone propionate 50 MICROgram(s)/spray Nasal Spray 1 Spray(s) Both Nostrils two times a day  insulin lispro (ADMELOG) corrective regimen sliding scale   SubCutaneous Before meals and at bedtime  levETIRAcetam 750 milliGRAM(s) Oral two times a day  melatonin 5 milliGRAM(s) Oral at bedtime  senna 2 Tablet(s) Oral at bedtime  sodium chloride 3 Gram(s) Oral every 6 hours  sodium chloride 3%. 500 milliLiter(s) (25 mL/Hr) IV Continuous <Continuous>  sucralfate 1 Gram(s) Oral every 12 hours    MEDICATIONS  (PRN):  acetaminophen     Tablet .. 650 milliGRAM(s) Oral every 6 hours PRN Temp greater or equal to 38C (100.4F), Mild Pain (1 - 3)      ALLERGIES:  Allergies    amoxicillin (Rash)    Intolerances        LABS:                        12.5   6.87  )-----------( 98       ( 13 Jul 2023 05:30 )             36.5     07-13    137  |  98  |  12  ----------------------------<  156<H>  3.8   |  28  |  0.62    Ca    9.0      13 Jul 2023 05:30  Phos  3.3     07-13  Mg     1.9     07-13        Urinalysis Basic - ( 13 Jul 2023 05:30 )    Color: x / Appearance: x / SG: x / pH: x  Gluc: 156 mg/dL / Ketone: x  / Bili: x / Urobili: x   Blood: x / Protein: x / Nitrite: x   Leuk Esterase: x / RBC: x / WBC x   Sq Epi: x / Non Sq Epi: x / Bacteria: x        RADIOLOGY & ADDITIONAL TESTS: Reviewed.

## 2023-07-13 NOTE — DISCHARGE NOTE PROVIDER - CARE PROVIDER_API CALL
Wood Dorado  Neurosurgery  [address], [address] [facility] [place] [address]  Phone: [phone]  Fax: [phone]  Follow Up Time:    Wood Dorado  Neurosurgery  [address] [place] [address]  Phone: [phone]  Fax: [phone]  Follow Up Time:     Christine Joseph  Medical Oncology  [address], [address]  [place] [address]  Phone: [phone]  Fax: [phone]  Follow Up Time:    Wood Dorado  Neurosurgery  [address], [address] [place] [address]  Phone: [phone]  Fax: [phone]  Follow Up Time:     Christine Joseph  Medical Oncology  [address], [address]  [place] [address]  Phone: [phone]  Fax: [phone]  Follow Up Time:     Hao Caba  Otolaryngology  [address] [place] [address]  Phone: [phone]  Fax: [phone]  Follow Up Time:

## 2023-07-13 NOTE — CONSULT NOTE ADULT - SUBJECTIVE AND OBJECTIVE BOX
69YO male with a past medical history of type 2 diabetes, hyperlipidemia, iron deficiency anemia, tracheal stenosis s/p tracheostomy (which was closed by ENT 7 days ago); glioblastoma s/p resection 1/27/2022, and Avastin treatment 3/2023 p/w AMS and  weakness. Patient had intermittent expressive aphasia as well as  nausea and vomiting.   Stroke code was called and pt had a Head CT which was negative for CVA.   However the pt's Head CT shows new hyperdensity in frontal parietal - surgical site. MRI is ordered to rule out tumor recurrence.   Pt was on Entresto and Eliquisin the past however they were stopped by his PC - cardiologist.       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  Type 2 diabetes mellitus  Hyperlipidemia  Iron deficiency anemia  Nephrolithiasis  Thrombocytopenia  Hyponatremia  BPH (benign prostatic hyperplasia)  S/P craniotomy  H/O tracheostomy    Home Medications:  aspirin 81 mg oral tablet: 1 tab(s) orally once a day (18 Apr 2023 13:40)  fluticasone 50 mcg/inh nasal spray: 1 spray(s) nasal 2 times a day (18 Apr 2023 13:38)  melatonin 3 mg oral tablet: 2 tab(s) orally once a day (at bedtime) (18 Apr 2023 13:38)  senna (sennosides) 8.6 mg oral tablet: 1 tab(s) orally once a day as needed for  constipation (18 Apr 2023 13:40)      Allergies amoxicillin (Rash)    FAMILY HISTORY:  FH: diabetes mellitus (Father, Mother)  FH: hyperlipidemia (Father)  FH: hypertension (Father, Mother)    VITAL SIGNS, INS/OUTS (last 24 hours):  Vital Signs Last 24 Hrs  T(C): 36.3 (13 Jul 2023 09:22), Max: 36.6 (12 Jul 2023 18:20)  T(F): 97.4 (13 Jul 2023 09:22), Max: 97.8 (12 Jul 2023 18:20)  HR: 60 (13 Jul 2023 08:48) (56 - 64)  BP: 146/78 (13 Jul 2023 08:48) (133/70 - 146/81)  BP(mean): 104 (13 Jul 2023 08:48) (96 - 105)  RR: 16 (13 Jul 2023 08:48) (16 - 18)  SpO2: 97% (13 Jul 2023 08:48) (94% - 97%)    PHYSICAL EXAM:  NAD laying in bed  CTA b/l no wheezing or crackles  NL S1,S2 no mumurs   soft NT/ND + BS no rebound or guarding   Neuro strength 5/5 b/l UE & LE                         12.5   6.87  )-----------( 98       ( 13 Jul 2023 05:30 )             36.5     137  |  98  |  12  ----------------------------<  156<H>  3.8   |  28  |  0.62    Ca    9.0      13 Jul 2023 05:30  Phos  3.3     07-13  Mg     1.9     07-13    CAPILLARY BLOOD GLUCOSE  POCT Blood Glucose.: 154 mg/dL (13 Jul 2023 06:32)  POCT Blood Glucose.: 215 mg/dL (12 Jul 2023 22:13)  POCT Blood Glucose.: 122 mg/dL (12 Jul 2023 17:25)      acetaminophen     Tablet .. 650 milliGRAM(s) Oral every 6 hours PRN  atorvastatin 20 milliGRAM(s) Oral at bedtime  chlorhexidine 2% Cloths 1 Application(s) Topical <User Schedule>  dexAMETHasone     Tablet 4 milliGRAM(s) Oral every 12 hours  escitalopram 5 milliGRAM(s) Oral daily  fludroCORTISONE 0.2 milliGRAM(s) Oral daily  fluticasone propionate 50 MICROgram(s)/spray Nasal Spray 1 Spray(s) Both Nostrils two times a day  insulin lispro (ADMELOG) corrective regimen sliding scale   SubCutaneous Before meals and at bedtime  levETIRAcetam 750 milliGRAM(s) Oral two times a day  magnesium oxide 400 milliGRAM(s) Oral once  melatonin 5 milliGRAM(s) Oral at bedtime  potassium chloride    Tablet ER 20 milliEquivalent(s) Oral once  senna 2 Tablet(s) Oral at bedtime  sodium chloride 3 Gram(s) Oral every 6 hours  sodium chloride 3%. 500 milliLiter(s) IV Continuous <Continuous>  sucralfate 1 Gram(s) Oral every 12 hours

## 2023-07-13 NOTE — DISCHARGE NOTE PROVIDER - PROVIDER TOKENS
PROVIDER:[TOKEN:[[id number]]] PROVIDER:[TOKEN:[[id number]]],PROVIDER:[TOKEN:[[id number]]] PROVIDER:[TOKEN:[[id number]]],PROVIDER:[TOKEN:[[id number]]],PROVIDER:[TOKEN:[[id number]]]

## 2023-07-13 NOTE — DISCHARGE NOTE PROVIDER - HOSPITAL COURSE
HPI:  Osiel Norwood is a 67YO male with a past medical history of type 2 diabetes, hyperlipidemia, iron deficiency anemia, tracheal stenosis s/p tracheostomy (which was closed by ENT 7 days ago); glioblastoma s/p resection 1/27/2022, and Avastin treatment 3/2023 presenting with altered mental status and weakness x2 days. Per family, they last saw him last night around 6pm when he was at baseline. Patient has intermittent expressive aphasia but is getting worse and now is persistent, and has intermittent nausea and vomiting since last night. Patient was supposed to receive an outpatient brain MRI on 7/25/2023.  Stroke code performed in  is negative for CVA. Head CT shows new hyperdensity in frontal parietal - surgical site. MRI is ordered to rule out tumor recurrence.   Per daughter at bedside, Entresto and Eliquis were stopped per his PC - cardiologist.       Hospital Course:  7/7: Admitted. Na 115 in ED with repeat 118, started 3% at 30cc/hr, and salt tabs 3q6, stability CTH in ED showing Interval development of a 13 mm hyperdense nodule along the   inferior margin of the resection cavity which may represent progression   of disease with hemorrhage, repeat CTH stable, urine studies ordered  7/8: Neuro stable, 12AM BMP Na114 3% increased to 40cc/hr, urine studies, pancx to r/o infection since pt is immunocompromised. Changed pantoprazole to sucralfate for GI ppx d/t thrombocytopenia. LE dopplers negative. DC'd 3%. ENT consulted to assess stoma, recommend follow up upon discharge with ENT, Repeat sodium 122. Restarted 3% at 30.  7/9: ANA LILIA o/n neuro stable. remains on 3%@30cc/hr. Started florinef, increased 3% to 50cc/hr. Increased 3% to 75cc/hr.    7/10: continued current 3% rate o/n. Na 127 --> 125, cont 3% @75cc/hr, f/u repeat Na this evening, likely stepdown tomorrow. Pend dispo home with home PT/OT when able. Heme consulted for thrombocytopenia  7/11: ANA LILIA o/n. Remains on 3% @75cc/hr. Decreased 3% to 50cc/hr. Started 1.2L fluid restriction. Repeat Na corrected 128  7/12: ANA LILIA overnight, remains on 3%, SDU from ICU  7/13: ANA LILIA overnight, neuro stable, on 3%@50      Patient evaluated by PT/OT who recommended:  Patient is going home? rehab? hospice? Facility Name:     Hospital course c/b:     Exam on day of discharge:    Checklist:   - Obtained follow up appointment from NP  - Reviewed final recommendations of inpatient consults  - review discharge planning on provider handoff  - post op imaging completed  - Neurologically stable for discharge  - Vitals stable for discharge   - Afebrile for discharge  - WBC is stable  - Sodium level is normal  - Pain is adequately controlled  - Pt has PICC/walker/brace/collar   - LACE score (10 or > needs PCP apt)   - stroke patient? Discharge NIHSS score   HPI:  Osiel Norwood is a 69YO male with a past medical history of type 2 diabetes, hyperlipidemia, iron deficiency anemia, tracheal stenosis s/p tracheostomy (which was closed by ENT 7 days ago); glioblastoma s/p resection 1/27/2022, and Avastin treatment 3/2023 presenting with altered mental status and weakness x2 days. Per family, they last saw him last night around 6pm when he was at baseline. Patient has intermittent expressive aphasia but is getting worse and now is persistent, and has intermittent nausea and vomiting since last night. Patient was supposed to receive an outpatient brain MRI on 7/25/2023.  Stroke code performed in  is negative for CVA. Head CT shows new hyperdensity in frontal parietal - surgical site. MRI is ordered to rule out tumor recurrence.   Per daughter at bedside, Entresto and Eliquis were stopped per his PC - cardiologist.       Hospital Course:  7/7: Admitted. Na 115 in ED with repeat 118, started 3% at 30cc/hr, and salt tabs 3q6, stability CTH in ED showing Interval development of a 13 mm hyperdense nodule along the   inferior margin of the resection cavity which may represent progression   of disease with hemorrhage, repeat CTH stable, urine studies ordered  7/8: Neuro stable, 12AM BMP Na114 3% increased to 40cc/hr, urine studies, pancx to r/o infection since pt is immunocompromised. Changed pantoprazole to sucralfate for GI ppx d/t thrombocytopenia. LE dopplers negative. DC'd 3%. ENT consulted to assess stoma, recommend follow up upon discharge with ENT, Repeat sodium 122. Restarted 3% at 30.  7/9: ANA LILIA o/n neuro stable. remains on 3%@30cc/hr. Started florinef, increased 3% to 50cc/hr. Increased 3% to 75cc/hr.    7/10: continued current 3% rate o/n. Na 127 --> 125, cont 3% @75cc/hr, f/u repeat Na this evening, likely stepdown tomorrow. Pend dispo home with home PT/OT when able. Heme consulted for thrombocytopenia  7/11: ANA LILIA o/n. Remains on 3% @75cc/hr. Decreased 3% to 50cc/hr. Started 1.2L fluid restriction. Repeat Na corrected 128  7/12: ANA LILIA overnight, remains on 3%, SDU from ICU  7/13: ANA LILIA overnight, neuro stable, on 3%@50  7/14: ANA LILIA overnight, neuro stable, remains on 3%@15cc/hr      Patient evaluated by PT/OT who recommended: home with home PT  Patient is going home     During hospital course treated for hyponatremia with sodium chloride, florinef and hypertonic saline and Na normalized.     Exam on day of discharge:    Checklist:   - Obtained follow up appointment from NP  - Reviewed final recommendations of inpatient consults  - review discharge planning on provider handoff  - post op imaging completed  - Neurologically stable for discharge  - Vitals stable for discharge   - Afebrile for discharge  - WBC is stable  - Sodium level is normal  - Pain is adequately controlled  - Pt has PICC/walker/brace/collar   - LACE score (10 or > needs PCP apt)   - stroke patient? Discharge NIHSS score   HPI:  Osiel Norwood is a 69YO male with a past medical history of type 2 diabetes, hyperlipidemia, iron deficiency anemia, tracheal stenosis s/p tracheostomy (which was closed by ENT 7 days ago); glioblastoma s/p resection 1/27/2022, and Avastin treatment 3/2023 presenting with altered mental status and weakness x2 days. Per family, they last saw him last night around 6pm when he was at baseline. Patient has intermittent expressive aphasia but is getting worse and now is persistent, and has intermittent nausea and vomiting since last night. Patient was supposed to receive an outpatient brain MRI on 7/25/2023.  Stroke code performed in  is negative for CVA. Head CT shows new hyperdensity in frontal parietal - surgical site. MRI is ordered to rule out tumor recurrence.   Per daughter at bedside, Entresto and Eliquis were stopped per his PC - cardiologist.       Hospital Course:  7/7: Admitted. Na 115 in ED with repeat 118, started 3% at 30cc/hr, and salt tabs 3q6, stability CTH in ED showing Interval development of a 13 mm hyperdense nodule along the   inferior margin of the resection cavity which may represent progression   of disease with hemorrhage, repeat CTH stable, urine studies ordered  7/8: Neuro stable, 12AM BMP Na114 3% increased to 40cc/hr, urine studies, pancx to r/o infection since pt is immunocompromised. Changed pantoprazole to sucralfate for GI ppx d/t thrombocytopenia. LE dopplers negative. DC'd 3%. ENT consulted to assess stoma, recommend follow up upon discharge with ENT, Repeat sodium 122. Restarted 3% at 30.  7/9: ANA LILIA o/n neuro stable. remains on 3%@30cc/hr. Started florinef, increased 3% to 50cc/hr. Increased 3% to 75cc/hr.    7/10: continued current 3% rate o/n. Na 127 --> 125, cont 3% @75cc/hr, f/u repeat Na this evening, likely stepdown tomorrow. Pend dispo home with home PT/OT when able. Heme consulted for thrombocytopenia  7/11: ANA LILIA o/n. Remains on 3% @75cc/hr. Decreased 3% to 50cc/hr. Started 1.2L fluid restriction. Repeat Na corrected 128  7/12: ANA LILIA overnight, remains on 3%, SDU from ICU  7/13: ANA LILIA overnight, neuro stable, on 3%@50  7/14: ANA LILIA overnight, neuro stable, remains on 3%@15cc/hr  7/15: ANA LILIA overnight. Neuro exam stable. Pt remains on 3% saline.  7/16: ANA LILIA overnight, hypertonics d/c now on urea powder/1L fluid restriction/florinef and salt tabs per renal, hydrocortisone cream ordered for rash on back, rec for Home PT  7/17: ANA LILIA overnight, following Na, renal following, ENT saw for hoarse voice rec followup with CT surgery for possible dilation, pending Home PT      Patient evaluated by PT/OT who recommended: home with home PT  Patient is going home     During hospital course treated for hyponatremia with sodium chloride, florinef and hypertonic saline and Na normalized.     Exam on day of discharge:  General: NAD, pt is comfortably sitting up in bed, on room air  HEENT: CN II-XII grossly intact, PERRL 3mm briskly reactive, EOMI b/l, face symmetric, tongue midline, neck FROM  Cardiovascular: RRR, normal S1 and S2   Respiratory: lungs CTAB, no wheezing, rhonchi, or crackles   GI: normoactive BS to auscultation, abd soft, NTND   Neuro: A&Ox2, No aphasia, speech clear, no dysmetria, no pronator drift. Follows commands.  DUMONT x4 spontaneously, 5/5 strength in all extremities throughout. SILT throughout   Extremities: warm and well perfused     Patient is neuro stable, vitals stable, afebrile, Na normalized,     Checklist:   - Obtained follow up appointment from NP  - Reviewed final recommendations of inpatient consults  - LACE score (10 or > needs PCP apt)      HPI:  Osiel Norwood is a 69YO male with a past medical history of type 2 diabetes, hyperlipidemia, iron deficiency anemia, tracheal stenosis s/p tracheostomy (which was closed by ENT 7 days ago); glioblastoma s/p resection 1/27/2022, and Avastin treatment 3/2023 presenting with altered mental status and weakness x2 days. Per family, they last saw him last night around 6pm when he was at baseline. Patient has intermittent expressive aphasia but is getting worse and now is persistent, and has intermittent nausea and vomiting since last night. Patient was supposed to receive an outpatient brain MRI on 7/25/2023.  Stroke code performed in  is negative for CVA. Head CT shows new hyperdensity in frontal parietal - surgical site. MRI is ordered to rule out tumor recurrence.   Per daughter at bedside, Entresto and Eliquis were stopped per his PC - cardiologist.       Hospital Course:  7/7: Admitted. Na 115 in ED with repeat 118, started 3% at 30cc/hr, and salt tabs 3q6, stability CTH in ED showing Interval development of a 13 mm hyperdense nodule along the   inferior margin of the resection cavity which may represent progression   of disease with hemorrhage, repeat CTH stable, urine studies ordered  7/8: Neuro stable, 12AM BMP Na114 3% increased to 40cc/hr, urine studies, pancx to r/o infection since pt is immunocompromised. Changed pantoprazole to sucralfate for GI ppx d/t thrombocytopenia. LE dopplers negative. DC'd 3%. ENT consulted to assess stoma, recommend follow up upon discharge with ENT, Repeat sodium 122. Restarted 3% at 30.  7/9: ANA LILIA o/n neuro stable. remains on 3%@30cc/hr. Started florinef, increased 3% to 50cc/hr. Increased 3% to 75cc/hr.    7/10: continued current 3% rate o/n. Na 127 --> 125, cont 3% @75cc/hr, f/u repeat Na this evening, likely stepdown tomorrow. Pend dispo home with home PT/OT when able. Heme consulted for thrombocytopenia  7/11: ANA LILIA o/n. Remains on 3% @75cc/hr. Decreased 3% to 50cc/hr. Started 1.2L fluid restriction. Repeat Na corrected 128  7/12: ANA LILIA overnight, remains on 3%, SDU from ICU  7/13: ANA LILIA overnight, neuro stable, on 3%@50  7/14: ANA LILIA overnight, neuro stable, remains on 3%@15cc/hr  7/15: ANA LILIA overnight. Neuro exam stable. Pt remains on 3% saline.  7/16: ANA LILIA overnight, hypertonics d/c now on urea powder/1L fluid restriction/florinef and salt tabs per renal, hydrocortisone cream ordered for rash on back, rec for Home PT  7/17: ANA LILIA overnight, following Na, renal following, ENT saw for hoarse voice rec followup with CT surgery for possible dilation, pending Home PT      Patient evaluated by PT/OT who recommended: home with home PT  Patient is going home     During hospital course treated for hyponatremia with sodium chloride, florinef and hypertonic saline and Na normalized.     Exam on day of discharge:  General: NAD, pt is comfortably sitting up in bed, on room air  HEENT: CN II-XII grossly intact, PERRL 3mm briskly reactive, EOMI b/l, face symmetric, tongue midline, neck FROM  Cardiovascular: RRR, normal S1 and S2   Respiratory: lungs CTAB, no wheezing, rhonchi, or crackles   GI: normoactive BS to auscultation, abd soft, NTND   Neuro: A&Ox2, No aphasia, speech clear, no dysmetria, no pronator drift. Follows commands.  DUMONT x4 spontaneously, 5/5 strength in all extremities throughout. SILT throughout   Extremities: warm and well perfused     Patient is neuro stable, vitals stable, afebrile, Na normalized and will follow up with PCP this week, medically ready for discharge

## 2023-07-13 NOTE — PROGRESS NOTE ADULT - SUBJECTIVE AND OBJECTIVE BOX
HPI:  Osiel Norwood is a 69YO male with a past medical history of type 2 diabetes, hyperlipidemia, iron deficiency anemia, tracheal stenosis s/p tracheostomy (which was closed by ENT 7 days ago); glioblastoma s/p resection 1/27/2022, and Avastin treatment 3/2023 presenting with altered mental status and weakness x2 days. Per family, they last saw him last night around 6pm when he was at baseline. Patient has intermittent expressive aphasia but is getting worse and now is persistent, and has intermittent nausea and vomiting since last night. Patient was supposed to receive an outpatient brain MRI on 7/25/2023.  Stroke code performed in  is negative for CVA. Head CT shows new hyperdensity in frontal parietal - surgical site. MRI is ordered to rule out tumor recurrence.   Per daughter at bedside, Entresto and Eliquis were stopped per his PC - cardiologist.      (07 Jul 2023 13:10)      Hospital course:   7/7: Admitted. Na 115 in ED with repeat 118, started 3% at 30cc/hr, and salt tabs 3q6, stability CTH in ED showing Interval development of a 13 mm hyperdense nodule along the   inferior margin of the resection cavity which may represent progression   of disease with hemorrhage, repeat CTH stable, urine studies ordered  7/8: Neuro stable, 12AM BMP Na114 3% increased to 40cc/hr, urine studies, pancx to r/o infection since pt is immunocompromised. Changed pantoprazole to sucralfate for GI ppx d/t thrombocytopenia. LE dopplers negative. DC'd 3%. ENT consulted to assess stoma, recommend follow up upon discharge with ENT, Repeat sodium 122. Restarted 3% at 30.  7/9: ANA LILIA o/n neuro stable. remains on 3%@30cc/hr. Started florinef, increased 3% to 50cc/hr. Increased 3% to 75cc/hr.    7/10: continued current 3% rate o/n. Na 127 --> 125, cont 3% @75cc/hr, f/u repeat Na this evening, likely stepdown tomorrow. Pend dispo home with home PT/OT when able. Heme consulted for thrombocytopenia  7/11: ANA LILIA o/n. Remains on 3% @75cc/hr. Decreased 3% to 50cc/hr. Started 1.2L fluid restriction. Repeat Na corrected 128  7/12: ANA LILIA overnight, remains on 3%, SDU from ICU  7/13: ANA LILIA overnight, neuro stable, on 3%@50      Vital Signs Last 24 Hrs  T(C): 36.4 (12 Jul 2023 23:00), Max: 36.7 (12 Jul 2023 05:53)  T(F): 97.5 (12 Jul 2023 23:00), Max: 98 (12 Jul 2023 05:53)  HR: 64 (13 Jul 2023 00:20) (56 - 64)  BP: 144/78 (13 Jul 2023 00:20) (133/70 - 151/58)  BP(mean): 104 (13 Jul 2023 00:20) (84 - 105)  RR: 18 (13 Jul 2023 00:20) (16 - 18)  SpO2: 97% (13 Jul 2023 00:20) (94% - 98%)    Parameters below as of 13 Jul 2023 00:20  Patient On (Oxygen Delivery Method): room air        I&O's Summary    11 Jul 2023 07:01  -  12 Jul 2023 07:00  --------------------------------------------------------  IN: 2845 mL / OUT: 3450 mL / NET: -605 mL    12 Jul 2023 07:01  -  13 Jul 2023 00:42  --------------------------------------------------------  IN: 1130 mL / OUT: 2500 mL / NET: -1370 mL        PHYSICAL EXAM:  General: NAD, pt is comfortably sitting up in bed, on room air  HEENT: CN II-XII grossly intact, PERRL 3mm briskly reactive, EOMI b/l, face symmetric, tongue midline, neck FROM  Cardiovascular: RRR, normal S1 and S2   Respiratory: lungs CTAB, no wheezing, rhonchi, or crackles   GI: normoactive BS to auscultation, abd soft, NTND   Neuro: A&Ox2, No aphasia, speech clear, no dysmetria, no pronator drift. Follows commands.  DUMONT x4 spontaneously, 5/5 strength in all extremities throughout. SILT throughout   Extremities: warm and well perfused       TUBES/LINES:  [] Campuzano  [] A-line  [] Lumbar Drain  [] Wound Drains  [] NGT   [] EVD   [] CVC  [] Other      DIET:  [] NPO  [x] Mechanical  [] Tube feeds    LABS:                        12.3   6.53  )-----------( 91       ( 12 Jul 2023 04:09 )             35.6     07-12    133<L>  |  98  |  14  ----------------------------<  129<H>  3.9   |  27  |  0.57    Ca    8.6      12 Jul 2023 19:27  Phos  2.7     07-12  Mg     1.8     07-12      PT/INR - ( 11 Jul 2023 05:30 )   PT: 12.8 sec;   INR: 1.07          PTT - ( 11 Jul 2023 05:30 )  PTT:23.7 sec  Urinalysis Basic - ( 12 Jul 2023 19:27 )    Color: x / Appearance: x / SG: x / pH: x  Gluc: 129 mg/dL / Ketone: x  / Bili: x / Urobili: x   Blood: x / Protein: x / Nitrite: x   Leuk Esterase: x / RBC: x / WBC x   Sq Epi: x / Non Sq Epi: x / Bacteria: x          CAPILLARY BLOOD GLUCOSE      POCT Blood Glucose.: 215 mg/dL (12 Jul 2023 22:13)  POCT Blood Glucose.: 122 mg/dL (12 Jul 2023 17:25)  POCT Blood Glucose.: 249 mg/dL (12 Jul 2023 11:13)  POCT Blood Glucose.: 143 mg/dL (12 Jul 2023 07:43)      Drug Levels: [] N/A    CSF Analysis: [] N/A      Allergies    amoxicillin (Rash)    Intolerances        Home Medications:  aspirin 81 mg oral tablet: 1 tab(s) orally once a day (18 Apr 2023 13:40)  fluticasone 50 mcg/inh nasal spray: 1 spray(s) nasal 2 times a day (18 Apr 2023 13:38)  melatonin 3 mg oral tablet: 2 tab(s) orally once a day (at bedtime) (18 Apr 2023 13:38)  senna (sennosides) 8.6 mg oral tablet: 1 tab(s) orally once a day as needed for  constipation (18 Apr 2023 13:40)      MEDICATIONS:  MEDICATIONS  (STANDING):  atorvastatin 20 milliGRAM(s) Oral at bedtime  chlorhexidine 2% Cloths 1 Application(s) Topical <User Schedule>  dexAMETHasone     Tablet 4 milliGRAM(s) Oral every 12 hours  escitalopram 5 milliGRAM(s) Oral daily  fludroCORTISONE 0.2 milliGRAM(s) Oral daily  fluticasone propionate 50 MICROgram(s)/spray Nasal Spray 1 Spray(s) Both Nostrils two times a day  insulin lispro (ADMELOG) corrective regimen sliding scale   SubCutaneous Before meals and at bedtime  levETIRAcetam 750 milliGRAM(s) Oral two times a day  melatonin 5 milliGRAM(s) Oral at bedtime  senna 2 Tablet(s) Oral at bedtime  sodium chloride 3 Gram(s) Oral every 6 hours  sodium chloride 3%. 500 milliLiter(s) (50 mL/Hr) IV Continuous <Continuous>  sucralfate 1 Gram(s) Oral every 12 hours    MEDICATIONS  (PRN):  acetaminophen     Tablet .. 650 milliGRAM(s) Oral every 6 hours PRN Temp greater or equal to 38C (100.4F), Mild Pain (1 - 3)      CULTURES:  Culture Results:   No growth at 4 days. (07-07 @ 23:47)  Culture Results:   No growth at 4 days. (07-07 @ 23:47)      RADIOLOGY & ADDITIONAL TESTS:      ASSESSMENT:  Pt is a 69YO male with a past medical history of type 2 diabetes, hyperlipidemia, iron deficiency anemia, tracheal stenosis s/p tracheostomy (which was closed by ENT 7 days ago); glioblastoma s/p resection 1/27/2022, and Avastin treatment 3/2023 presenting with expressive aphasia, nuasea and vomiting x1 day. CT shows new hyperdensity in left frontal parietal - surgical site c/f disease progression. Na in the . Admitted to NSICU for further management.     Plan:  Neuro:  -neuro q4/vitals q4hrs   -CTH 7/7: Interval development of a 13 mm hyperdense nodule along the   inferior margin of the resection cavity which may represent progression   of disease with hemorrhage. Repeat CTH stable  -CTA 7/7: negative.  -pain control prn  -c/w home Keppra 750mg BID  -Decadron 4mg BID  -c/w home Lexapro  -home ASA 81 held i/s/o thrombocytopenia   -MRI suspicious for recurrent tumor and hydro     Pulm:  -satting well on RA    Cardio:  -SBP<160    GI:  -CCD with 1.2L fluid restriction  -bowel regimen  -sucralfate  -last BM 7/10     Renal:  -hyponatremic, Na 118 on admission  -1.2L fluid restriction started 7/11  -3% at 50cc/hr  -florinef 0.2mg daily, salt tabs 3q6  -voiding    Endo:  -ISS  - A1c 5.7  -h/o T2DM: home Januvia held  -h/o HLD: c/w Atorvastatin 20mg    Heme:  -SCDs for DVT ppx, SQL held for thrombocytopenia   -heme consulted for thrombocytopenia- w/u sent, likely chronic thrombocytopenia d/t chemotherapy agent in past   -LE dopplers 7/8 negative    ID:  -afebrile  -pan cx 7/7    Dispo: SDU status, full code, Home PT    D/w Dr. Dorado      Assessment: present when checked     [] GCS   E   V   M     Heart Failure: [] Acute, [] acute on chronic, [] chronic   Heart Failure: [] Diastolic (HFpEF), [] Systolic (HRrEF), [] Combined (HFpEF and HFrEF), [] RHF, [] Pulm HTN, [] Other     [] EUGENIA, [] ATN, [] AIN, [] other   [] CKD1, [] CKD2, [] CKD3, [] CKD4, [] CKD5, [] ESRD     Encephalopathy: [] Metabolic, [] Hepatic, [] Toxic, [] Neurological, [] Other     Abnormal Nutritional Status: [] malnutrition (see nutrition note), []underweight: BMI <19, [] morbid obesity: BMI >40, [] Cachexia     [] Sepsis   [] Hypovolemic shock, [] Cardiogenic shock, [] Hemorrhagic shock, [] Neurogenic shock   [] Acute respiratory failure   [] Cerebral edema, [] Brain compression / herniation   [] Functional quadriplegia   [] Acute blood loss anemia

## 2023-07-13 NOTE — CHART NOTE - NSCHARTNOTEFT_GEN_A_CORE
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 No edema noted     Labs:   07-13    137  |  98  |  12  ----------------------------<  156<H>  3.8   |  28  |  0.62    Ca    9.0      13 Jul 2023 05:30  Phos  3.3     07-13  Mg     1.9     07-13      CAPILLARY BLOOD GLUCOSE      POCT Blood Glucose.: 206 mg/dL (13 Jul 2023 12:15)  POCT Blood Glucose.: 154 mg/dL (13 Jul 2023 06:32)  POCT Blood Glucose.: 215 mg/dL (12 Jul 2023 22:13)  POCT Blood Glucose.: 122 mg/dL (12 Jul 2023 17:25)      Medications:  MEDICATIONS  (STANDING):  atorvastatin 20 milliGRAM(s) Oral at bedtime  chlorhexidine 2% Cloths 1 Application(s) Topical <User Schedule>  dexAMETHasone     Tablet 4 milliGRAM(s) Oral every 12 hours  escitalopram 5 milliGRAM(s) Oral daily  fludroCORTISONE 0.2 milliGRAM(s) Oral daily  fluticasone propionate 50 MICROgram(s)/spray Nasal Spray 1 Spray(s) Both Nostrils two times a day  insulin lispro (ADMELOG) corrective regimen sliding scale   SubCutaneous Before meals and at bedtime  levETIRAcetam 750 milliGRAM(s) Oral two times a day  melatonin 5 milliGRAM(s) Oral at bedtime  senna 2 Tablet(s) Oral at bedtime  sodium chloride 3 Gram(s) Oral every 6 hours  sodium chloride 3%. 500 milliLiter(s) (25 mL/Hr) IV Continuous <Continuous>  sucralfate 1 Gram(s) Oral every 12 hours    MEDICATIONS  (PRN):  acetaminophen     Tablet .. 650 milliGRAM(s) Oral every 6 hours PRN Temp greater or equal to 38C (100.4F), Mild Pain (1 - 3)      Weight:  Daily     Daily     Weight Change: no new wts to review at this time; last wt taken on admit 7/7    Estimated energy needs:   Weight used for calculations	current weight  Estimated Energy Needs Weight (lbs)	160 lb  Estimated Energy Needs Weight (kg)	72.5 kg    Estimated Energy Needs From (ji/kg) 30  Estimated Energy Needs To (ji/kg)	35  Estimated Energy Needs Calculated From (ji/kg) 2175  Estimated Energy Needs Calculated To (ji/kg)	2537    Estimated Protein Needs From (g/kg)	1.2  Estimated Protein Needs To (g/kg)	1.5  Estimated Protein Needs Calculated From (g/kg) 87  Estimated Protein Needs Calculated To (g/kg)	108.75    Other Calculations	Based on Standards of Care pt within % IBW (108%) thus actual body weight used for all calculations (160#). Needs adjusted for advanced age and malnutrition. Fluid recs per team in view of hyponatremia.    Subjective:   69YO male with a past medical history of type 2 diabetes, hyperlipidemia, iron deficiency anemia, tracheal stenosis s/p tracheostomy (which was closed by ENT 7 days ago); glioblastoma s/p resection 1/27/2022, and Avastin treatment 3/2023 presenting with altered mental status and weakness x2 days. Per family, they last saw him last night around 6pm when he was at baseline. Patient has intermittent expressive aphasia but is getting worse and now is persistent, and has intermittent nausea and vomiting since last night.    Visited pt at bedside this afternoon on 8LA. Family member in the room. On assessment pt is awake and alert, consumes lunch. Reports good appetite/intake in-house; tolerates % meal tray. However would like to receive more fluids. Noted pt is on 1.2L fluid restriction since admit. Defer fluids to team. Nutrition related labs reviewed; high FSBG 206, 154, 215- insulin regimen ordered; Na now wnl- low prior. No further nutrition-related concerns at this time. RD to remain available for recs adjustment prn or will follow up pt per organizational policy     Previous Nutrition Diagnosis:  Malnutrition (moderate) related to chronic disease as evidenced by <75% nutrition needs >1 month, mild muscle wasting    Active [ X ]  Resolved [   ]    Goal:  - Consistently meets > 75% EER   - does not show further s/s of malnutrition     Recommendations:  1. Continue current diet order (conscho)  > continue fluids per team (? d/c fluid restriction)   > monitor need/agreeability for oral nutrition supplement regimen when fluid restriction is removed   2. Monitor PO intake; honor pt food preferences as able   3. Monitor GI fxn, skin integrity, labs, wts   > adjust bowel regimen prn to promote optimal stooling   4. RD to remain available for recs adjustment prn or will follow up pt per organizational policy     Education: encouraged continue with adequate PO intake     Risk Level: High [   ] Moderate [ X ] Low [   ]

## 2023-07-13 NOTE — CONSULT NOTE ADULT - ASSESSMENT
67YO male with a past medical history of type 2 diabetes, hyperlipidemia, iron deficiency anemia, tracheal stenosis s/p tracheostomy (which was closed by ENT 7 days ago); glioblastoma s/p resection 1/27/2022, and Avastin treatment 3/2023 presenting with expressive aphasia, nuasea and vomiting x1 day. CT shows new hyperdensity in left frontal parietal - surgical site c/f disease progression.      # Hyperdensity in left frontal parietal which may represent progression of disease.   -management per primary team   -pain control prn  -Continue  Keppra 750mg BID  -Continue Decadron 4mg q12  -Pt's  ASA 81 held i/s/o thrombocytopenia     #Hyponatremia   -Continue  fluid restriction 1.2L   -3% at 50cc/hr  -florinef 0.2mg daily, salt tabs 3q6  -Will continue to monitor Na    #Type II Diabetes  - A1c 5.7  -Pt is on Januvia at home   -Continue ISS     #HLD  - Continue  Atorvastatin 20mg daily     #Thrombocytopenia   -heme consulted for thrombocytopenia; Per heme  likely chronic thrombocytopenia d/t chemotherapy agent in past   - Will continue to monitor platelets     #Dispo:   -Pt is for  Home PT

## 2023-07-13 NOTE — DISCHARGE NOTE PROVIDER - NSDCMRMEDTOKEN_GEN_ALL_CORE_FT
aspirin 81 mg oral tablet: 1 tab(s) orally once a day  atorvastatin 20 mg oral tablet: 1 tab(s) orally once a day (at bedtime)  escitalopram 5 mg oral tablet: 1 tab(s) orally once a day  fluticasone 50 mcg/inh nasal spray: 1 spray(s) nasal 2 times a day  Januvia 100 mg oral tablet: 1 tab(s) orally once a day  levETIRAcetam 750 mg oral tablet: 1 tab(s) orally 2 times a day  melatonin 3 mg oral tablet: 2 tab(s) orally once a day (at bedtime)  ondansetron 4 mg oral tablet: 1 tab(s) orally once a day  pantoprazole 40 mg oral delayed release tablet: 1 tab(s) orally once a day (before a meal)  senna (sennosides) 8.6 mg oral tablet: 1 tab(s) orally once a day as needed for  constipation  sodium chloride 1 g oral tablet: 2 tab(s) orally every 6 hours   aspirin 81 mg oral tablet: 1 tab(s) orally once a day  atorvastatin 20 mg oral tablet: 1 tab(s) orally once a day (at bedtime)  escitalopram 5 mg oral tablet: 1 tab(s) orally once a day  fluticasone 50 mcg/inh nasal spray: 1 spray(s) nasal 2 times a day  Januvia 100 mg oral tablet: 1 tab(s) orally once a day  levETIRAcetam 750 mg oral tablet: 1 tab(s) orally 2 times a day  melatonin 3 mg oral tablet: 2 tab(s) orally once a day (at bedtime)  ondansetron 4 mg oral tablet: 1 tab(s) orally once a day  senna (sennosides) 8.6 mg oral tablet: 1 tab(s) orally once a day as needed for  constipation  sodium chloride 1 g oral tablet: 2 tab(s) orally every 6 hours   acetaminophen 325 mg oral tablet: 2 tab(s) orally every 6 hours As needed Temp greater or equal to 38C (100.4F), Mild Pain (1 - 3)  atorvastatin 20 mg oral tablet: 1 tab(s) orally once a day (at bedtime)  dexAMETHasone 2 mg oral tablet: 1 tab(s) orally 2 times a day  escitalopram 5 mg oral tablet: 1 tab(s) orally once a day  fludrocortisone 0.1 mg oral tablet: 2 tab(s) orally once a day  fluticasone 50 mcg/inh nasal spray: 1 spray(s) nasal 2 times a day  hydrocortisone 1% topical cream: 1 Apply topically to affected area once a day  Januvia 100 mg oral tablet: 1 tab(s) orally once a day  levETIRAcetam 750 mg oral tablet: 1 tab(s) orally 2 times a day  melatonin 3 mg oral tablet: 2 tab(s) orally once a day (at bedtime)  ondansetron 4 mg oral tablet: 1 tab(s) orally once a day  senna (sennosides) 8.6 mg oral tablet: 1 tab(s) orally once a day as needed for  constipation  sodium chloride 1 g oral tablet: 3 tab(s) orally every 6 hours  sucralfate 1 g oral tablet: 1 tab(s) orally every 12 hours  urea 15 g oral powder for reconstitution: 1 packet(s) orally every 12 hours

## 2023-07-13 NOTE — CHART NOTE - NSCHARTNOTEFT_GEN_A_CORE
Neurologically stable. No surgical plans. On 3% at 15, repeat BMP at 10pm and if Na stable can d/c and recheck labs in the AM. Hepatitis B reactive likely contributing to the patients thrombocytopenia.

## 2023-07-13 NOTE — DISCHARGE NOTE PROVIDER - NSDCFUSCHEDAPPT_GEN_ALL_CORE_FT
Wood Dorado  [facility] PreAdmits  Scheduled Appointment: 07/24/2023    [facility]  MRI  [address]  Scheduled Appointment: 07/24/2023    Wood Dorado  [facility]  NEUROSURG [address]  Scheduled Appointment: 07/24/2023    Hao Caba  [facility]  OTOLARYNG [address]  Scheduled Appointment: 07/25/2023    Tera Gannon  [facility] Physician [facility]  ENDOCRIN [address] [facility] [address]  Scheduled Appointment: 08/01/2023    Vaughn Mitchell  [facility]  CARDIOLOGY [address]   Scheduled Appointment: 08/07/2023

## 2023-07-13 NOTE — DISCHARGE NOTE PROVIDER - NSDCFUADDINST_GEN_ALL_CORE_FT
Neurosurgery follow up appointment date/time:  - please call the office to confirm appointment: [phone]    Devices/Drains/Lines:    Activity:  - walking is recommended, ambulate as tolerated  - no driving within 24 hours of anesthesia or while taking prescription pain medications   - keep hydrated, drink plenty of water     Inpatient consults:    Please also follow up with your primary care doctor.     Pain Expectations:  - pain after surgery is expected  - please take pain meds as prescribed     Medications:  - changes to home meds (ex. AED's)?  - new meds?  - pain meds?  - when can antiplatelets or anticoagulants be restarted?  - were adverse affects of meds discussed with patients?   - pain medications can cause constipation, you should eat a high fiber diet and may take a stool softener while on pain meds   - Avoid taking Advil (ibuprofen), Motrin (naproxen), or Aspirin for pain as they can cause bleeding     Call the office or come to ED if:  - wound has drainage or bleeding, increased redness or pain at incision site, neurological change, fever (>101), chills, night sweats, syncope, nausea/vomiting, chest pain, shortness of breath      Playback:  - See [facility] for a copy of your discharge paperwork     WITHIN 24 HOURS OF DISCHARGE, PLEASE CONTACT NEURO PA  WITH ANY QUESTIONS OR CONCERNS: [phone]   OTHERWISE, PLEASE CALL THE OFFICE WITH ANY QUESTIONS OR CONCERNS: [phone] Neurosurgery follow up appointment date/time:  - please call the office to confirm appointment: [phone]    Activity:  - walking is recommended, ambulate as tolerated  - no driving within 24 hours of anesthesia or while taking prescription pain medications   - keep hydrated, drink plenty of water     Inpatient consults:  - Hematology Oncology: Thrombocytopenia likely due to past treatment with Temozolomide. Follow up with Dr. Joseph outpatient   - ENT: CT with proximal tracheal stenosis. Please follow up with Dr. Caba outpatient     Please also follow up with your primary care doctor.     Pain Expectations:  - pain after surgery is expected  - please take pain meds as prescribed     Medications:  - changes to home meds (ex. AED's)?  - new meds?  - pain meds?  - when can antiplatelets or anticoagulants be restarted?  - were adverse affects of meds discussed with patients?   - pain medications can cause constipation, you should eat a high fiber diet and may take a stool softener while on pain meds   - Avoid taking Advil (ibuprofen), Motrin (naproxen), or Aspirin for pain as they can cause bleeding     Call the office or come to ED if:  - wound has drainage or bleeding, increased redness or pain at incision site, neurological change, fever (>101), chills, night sweats, syncope, nausea/vomiting, chest pain, shortness of breath      Playback:  - See [facility] for a copy of your discharge paperwork     WITHIN 24 HOURS OF DISCHARGE, PLEASE CONTACT NEURO PA  WITH ANY QUESTIONS OR CONCERNS: [phone]   OTHERWISE, PLEASE CALL THE OFFICE WITH ANY QUESTIONS OR CONCERNS: [phone] Neurosurgery follow up appointment date/time:  - please call the office to confirm appointment: [phone]    Activity:  - walking is recommended, ambulate as tolerated  - no driving within 24 hours of anesthesia or while taking prescription pain medications   - keep hydrated, drink plenty of water     Inpatient consults:  - Hematology Oncology: Thrombocytopenia likely due to past treatment with Temozolomide. Follow up with Dr. Joseph outpatient   - ENT: CT with proximal tracheal stenosis. Please follow up with Dr. Caba outpatient   - Nephrology: Follow up outpatient. Continue fluid restriction. Continue Salt tabs, Urena, Florinef. You will need repeat BMP, Urine Na and Urine osm with PCP this week.     Please also follow up with your primary care doctor this week for BMP, Urine Na and Urine Osm check.     Pain Expectations:  - pain after surgery is expected  - please take pain meds as prescribed     Medications:  - changes to home meds (ex. AED's)?  - new meds?  - pain meds?  - when can antiplatelets or anticoagulants be restarted?  - were adverse affects of meds discussed with patients?   - pain medications can cause constipation, you should eat a high fiber diet and may take a stool softener while on pain meds   - Avoid taking Advil (ibuprofen), Motrin (naproxen), or Aspirin for pain as they can cause bleeding     Call the office or come to ED if:  - wound has drainage or bleeding, increased redness or pain at incision site, neurological change, fever (>101), chills, night sweats, syncope, nausea/vomiting, chest pain, shortness of breath      Playback:  - See [facility] for a copy of your discharge paperwork     WITHIN 24 HOURS OF DISCHARGE, PLEASE CONTACT NEURO PA  WITH ANY QUESTIONS OR CONCERNS: [phone]   OTHERWISE, PLEASE CALL THE OFFICE WITH ANY QUESTIONS OR CONCERNS: [phone] Neurosurgery follow up appointment date/time: 7/24/23 at 12:30PM  - please call the office to confirm appointment: [phone]    Activity:  - walking is recommended, ambulate as tolerated  - no driving within 24 hours of anesthesia or while taking prescription pain medications   - keep hydrated, drink plenty of water     Inpatient consults:  - Hematology Oncology: Thrombocytopenia likely due to past treatment with Temozolomide. Follow up with Dr. Joseph outpatient   - ENT: CT showing proximal tracheal stenosis. Please follow up with Dr. Caba outpatient   - Nephrology: Follow up outpatient. Continue fluid restriction. Continue Salt tabs, Urena, Florinef. You will need repeat BMP, Urine Na and Urine osm with PCP this week.     Please also follow up with your primary care doctor this week for BMP, Urine Na and Urine Osm check.     Pain Expectations:  - pain after surgery is expected  - please take pain meds as prescribed     Medications:  - Hold home Aspirin (because of low platelets)   - Protonix switched to Sucralfate (because of low platelets)   - new meds: Urea oral powder every 12 hours, Florinef 0.2mg daily (can cause swelling), Sodium chloride 3g every 6 hours for low sodium.   - Otherwise continue home meds as prescribed: Atorvastatin, Dexamethasone, Lexapro, Januvia, Keppra  - pain meds: Tylenol (over the counter) as needed  - adverse affects of meds discussed with patient  - pain medications can cause constipation, you should eat a high fiber diet and may take a stool softener while on pain meds   - Avoid taking Advil (ibuprofen), Motrin (naproxen), or Aspirin for pain as they can cause bleeding     Call the office or come to ED if:  - wound has drainage or bleeding, increased redness or pain at incision site, neurological change, fever (>101), chills, night sweats, syncope, nausea/vomiting, chest pain, shortness of breath      Playback:  - See [facility] for a copy of your discharge paperwork     WITHIN 24 HOURS OF DISCHARGE, PLEASE CONTACT NEURO PA  WITH ANY QUESTIONS OR CONCERNS: [phone]   OTHERWISE, PLEASE CALL THE OFFICE WITH ANY QUESTIONS OR CONCERNS: [phone] Neurosurgery follow up appointment date/time: 7/24/23 at 12:30PM  - please call the office to confirm appointment: [phone]    Activity:  - walking is recommended, ambulate as tolerated  - no driving within 24 hours of anesthesia or while taking prescription pain medications   - keep hydrated, drink plenty of water     Inpatient consults:  - Hematology Oncology: Thrombocytopenia likely due to past treatment with Temozolomide. Follow up with Dr. Joseph outpatient   - ENT: CT showing proximal tracheal stenosis. Please follow up with Dr. Caba outpatient   - Nephrology: Follow up outpatient. Continue fluid restriction. Continue Salt tabs, Urena, Florinef. You will need repeat BMP, Urine Na and Urine osm with PCP this week.     Please also follow up with your primary care doctor this week for BMP, Urine Na and Urine Osm check.     Pain Expectations:  - pain after surgery is expected  - please take pain meds as prescribed     Medications:  - Hold home Aspirin (because of low platelets)   - Protonix switched to Sucralfate (because of low platelets, can cause constipation)   - new meds: Urea oral powder every 12 hours, Florinef 0.2mg daily (can cause swelling), Sodium chloride 3g every 6 hours for low sodium.   - Otherwise continue home meds as prescribed: Atorvastatin, Dexamethasone, Lexapro, Januvia, Keppra  - pain meds: Tylenol (over the counter) as needed  - adverse affects of meds discussed with patient  - pain medications can cause constipation, you should eat a high fiber diet and may take a stool softener while on pain meds   - Avoid taking Advil (ibuprofen), Motrin (naproxen), or Aspirin for pain as they can cause bleeding     Call the office or come to ED if:  - wound has drainage or bleeding, increased redness or pain at incision site, neurological change, fever (>101), chills, night sweats, syncope, nausea/vomiting, chest pain, shortness of breath      Playback:  - See [facility] for a copy of your discharge paperwork     WITHIN 24 HOURS OF DISCHARGE, PLEASE CONTACT NEURO PA  WITH ANY QUESTIONS OR CONCERNS: [phone]   OTHERWISE, PLEASE CALL THE OFFICE WITH ANY QUESTIONS OR CONCERNS: [phone]

## 2023-07-13 NOTE — DISCHARGE NOTE PROVIDER - NSDCCPCAREPLAN_GEN_ALL_CORE_FT
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 Continue Keppra, Dexamethasone    Diagnosis: Type 2 diabetes mellitus  Assessment and Plan of Treatment:     Diagnosis: Hyperlipidemia  Assessment and Plan of Treatment: continue home Atorvastatin    Diagnosis: Iron deficiency anemia  Assessment and Plan of Treatment:     Diagnosis: Thrombocytopenia  Assessment and Plan of Treatment: Follow up with Hematology outpatient    Diagnosis: BPH (benign prostatic hyperplasia)  Assessment and Plan of Treatment:     Diagnosis: Brain mass  Assessment and Plan of Treatment:     Diagnosis: Tracheal stenosis  Assessment and Plan of Treatment: Follow up with ENT outpatient     PRINCIPAL DISCHARGE DIAGNOSIS  Diagnosis: Hyponatremia  Assessment and Plan of Treatment: continue Urea oral powder, Florinef and Sodium chloride tablets. 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 Continue Keppra, Dexamethasone    Diagnosis: Type 2 diabetes mellitus  Assessment and Plan of Treatment: continue home Januvia    Diagnosis: Hyperlipidemia  Assessment and Plan of Treatment: continue home Atorvastatin    Diagnosis: Iron deficiency anemia  Assessment and Plan of Treatment:     Diagnosis: Thrombocytopenia  Assessment and Plan of Treatment: Follow up with Hematology outpatient, continue to hold home Aspirin, Protonix switched to Sucralfate    Diagnosis: BPH (benign prostatic hyperplasia)  Assessment and Plan of Treatment:     Diagnosis: Brain mass  Assessment and Plan of Treatment:     Diagnosis: Tracheal stenosis  Assessment and Plan of Treatment: Follow up with ENT outpatient     PRINCIPAL DISCHARGE DIAGNOSIS  Diagnosis: Hyponatremia  Assessment and Plan of Treatment: continue Urea oral powder, Florinef and Sodium chloride tablets. Continue 1L fluid restriction. 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 Continue Keppra, Dexamethasone    Diagnosis: Type 2 diabetes mellitus  Assessment and Plan of Treatment: continue home Januvia    Diagnosis: Hyperlipidemia  Assessment and Plan of Treatment: continue home Atorvastatin    Diagnosis: Iron deficiency anemia  Assessment and Plan of Treatment:     Diagnosis: Thrombocytopenia  Assessment and Plan of Treatment: Follow up with Hematology outpatient, continue to hold home Aspirin, Protonix switched to Sucralfate    Diagnosis: BPH (benign prostatic hyperplasia)  Assessment and Plan of Treatment:     Diagnosis: Brain mass  Assessment and Plan of Treatment:     Diagnosis: Tracheal stenosis  Assessment and Plan of Treatment: Follow up with ENT outpatient

## 2023-07-14 NOTE — PROGRESS NOTE ADULT - ASSESSMENT
69YO male with a past medical history of type 2 diabetes, hyperlipidemia, iron deficiency anemia, tracheal stenosis s/p tracheostomy (which was closed by ENT 7 days ago); glioblastoma s/p resection 1/27/2022, and Avastin treatment 3/2023 presenting with expressive aphasia, nuasea and vomiting x1 day. CT shows new hyperdensity in left frontal parietal - surgical site c/f disease progression.      # Hyperdensity in left frontal parietal which may represent progression of disease.   -Management per primary team   -Continue  Keppra 750mg BID  -Continue Decadron 4mg q12  -Pt's  ASA 81 held i/s/o thrombocytopenia     #Hyponatremia   -Continue  fluid restriction   -Pt's hypertonic saline was d/c'ed   -florinef 0.2mg daily, salt tabs 3q6  -Will continue to monitor Na    #Type II Diabetes  - A1c 5.7  -Pt is on Januvia at home   -Continue ISS     #HLD  - Continue Atorvastatin 20mg daily     #Thrombocytopenia   -Heme consulted for thrombocytopenia; Per Heme documentation the pt w/  history of thrombocytopenia with his baseline range  since the latter half of last year and that the pt's Pt's thrombocytopenia likely due to past treatment with temozolomide.   Per heme transfuse 1U plt if bleeding and platelet count <50k, if febrile and platelet count <20k,   Dopplers 7/8/23 negative for DVT    #Dispo:   -Pt is for  Home PT

## 2023-07-14 NOTE — PROGRESS NOTE ADULT - SUBJECTIVE AND OBJECTIVE BOX
HPI:  Osiel Norwood is a 69YO male with a past medical history of type 2 diabetes, hyperlipidemia, iron deficiency anemia, tracheal stenosis s/p tracheostomy (which was closed by ENT 7 days ago); glioblastoma s/p resection 1/27/2022, and Avastin treatment 3/2023 presenting with altered mental status and weakness x2 days. Per family, they last saw him last night around 6pm when he was at baseline. Patient has intermittent expressive aphasia but is getting worse and now is persistent, and has intermittent nausea and vomiting since last night. Patient was supposed to receive an outpatient brain MRI on 7/25/2023.  Stroke code performed in  is negative for CVA. Head CT shows new hyperdensity in frontal parietal - surgical site. MRI is ordered to rule out tumor recurrence.   Per daughter at bedside, Entresto and Eliquis were stopped per his PC - cardiologist.      (07 Jul 2023 13:10)      Hospital course:   7/7: Admitted. Na 115 in ED with repeat 118, started 3% at 30cc/hr, and salt tabs 3q6, stability CTH in ED showing Interval development of a 13 mm hyperdense nodule along the   inferior margin of the resection cavity which may represent progression   of disease with hemorrhage, repeat CTH stable, urine studies ordered  7/8: Neuro stable, 12AM BMP Na114 3% increased to 40cc/hr, urine studies, pancx to r/o infection since pt is immunocompromised. Changed pantoprazole to sucralfate for GI ppx d/t thrombocytopenia. LE dopplers negative. DC'd 3%. ENT consulted to assess stoma, recommend follow up upon discharge with ENT, Repeat sodium 122. Restarted 3% at 30.  7/9: ANA LILIA o/n neuro stable. remains on 3%@30cc/hr. Started florinef, increased 3% to 50cc/hr. Increased 3% to 75cc/hr.    7/10: continued current 3% rate o/n. Na 127 --> 125, cont 3% @75cc/hr, f/u repeat Na this evening, likely stepdown tomorrow. Pend dispo home with home PT/OT when able. Heme consulted for thrombocytopenia  7/11: ANA LILIA o/n. Remains on 3% @75cc/hr. Decreased 3% to 50cc/hr. Started 1.2L fluid restriction. Repeat Na corrected 128  7/12: ANA LILIA overnight, remains on 3%, SDU from ICU  7/13: ANA LILIA overnight, neuro stable, on 3%@50, Am sodium 137, decreased 3% to 25cc/hr, repeat Na 138, decreased to 15cc/hr.  7/14: ANA LILIA overnight, neuro stable, remains on 3%@15cc/hr      Vital Signs Last 24 Hrs  T(C): 36.6 (13 Jul 2023 20:00), Max: 36.7 (13 Jul 2023 18:13)  T(F): 97.8 (13 Jul 2023 20:00), Max: 98.1 (13 Jul 2023 18:13)  HR: 62 (13 Jul 2023 20:00) (58 - 68)  BP: 116/67 (13 Jul 2023 20:00) (116/67 - 160/81)  BP(mean): 102 (13 Jul 2023 16:03) (93 - 112)  RR: 16 (13 Jul 2023 20:00) (16 - 17)  SpO2: 96% (13 Jul 2023 20:00) (95% - 99%)    Parameters below as of 13 Jul 2023 20:00  Patient On (Oxygen Delivery Method): room air        I&O's Summary    12 Jul 2023 07:01  -  13 Jul 2023 07:00  --------------------------------------------------------  IN: 1405 mL / OUT: 2900 mL / NET: -1495 mL    13 Jul 2023 07:01  -  14 Jul 2023 01:54  --------------------------------------------------------  IN: 1345 mL / OUT: 2175 mL / NET: -830 mL        PHYSICAL EXAM:  General: NAD, pt is comfortably sitting up in bed, on room air  HEENT: CN II-XII grossly intact, PERRL 3mm briskly reactive, EOMI b/l, face symmetric, tongue midline, neck FROM  Cardiovascular: RRR, normal S1 and S2   Respiratory: lungs CTAB, no wheezing, rhonchi, or crackles   GI: normoactive BS to auscultation, abd soft, NTND   Neuro: A&Ox2, No aphasia, speech clear, no dysmetria, no pronator drift. Follows commands.  DUMONT x4 spontaneously, 5/5 strength in all extremities throughout. SILT throughout   Extremities: warm and well perfused       TUBES/LINES:  [] Campuzano  [] A-line  [] Lumbar Drain  [] Wound Drains  [] NGT   [] EVD   [] CVC  [] Other      DIET:  [] NPO  [x] Mechanical  [] Tube feeds    LABS:                        12.5   6.87  )-----------( 98       ( 13 Jul 2023 05:30 )             36.5     07-13    135  |  99  |  16  ----------------------------<  169<H>  3.7   |  28  |  0.60    Ca    9.0      13 Jul 2023 22:02  Phos  3.3     07-13  Mg     1.9     07-13        Urinalysis Basic - ( 13 Jul 2023 22:02 )    Color: x / Appearance: x / SG: x / pH: x  Gluc: 169 mg/dL / Ketone: x  / Bili: x / Urobili: x   Blood: x / Protein: x / Nitrite: x   Leuk Esterase: x / RBC: x / WBC x   Sq Epi: x / Non Sq Epi: x / Bacteria: x          CAPILLARY BLOOD GLUCOSE      POCT Blood Glucose.: 157 mg/dL (13 Jul 2023 22:24)  POCT Blood Glucose.: 202 mg/dL (13 Jul 2023 15:45)  POCT Blood Glucose.: 206 mg/dL (13 Jul 2023 12:15)  POCT Blood Glucose.: 154 mg/dL (13 Jul 2023 06:32)      Drug Levels: [] N/A    CSF Analysis: [] N/A      Allergies    amoxicillin (Rash)    Intolerances        Home Medications:  aspirin 81 mg oral tablet: 1 tab(s) orally once a day (18 Apr 2023 13:40)  fluticasone 50 mcg/inh nasal spray: 1 spray(s) nasal 2 times a day (18 Apr 2023 13:38)  melatonin 3 mg oral tablet: 2 tab(s) orally once a day (at bedtime) (18 Apr 2023 13:38)  senna (sennosides) 8.6 mg oral tablet: 1 tab(s) orally once a day as needed for  constipation (18 Apr 2023 13:40)      MEDICATIONS:  MEDICATIONS  (STANDING):  atorvastatin 20 milliGRAM(s) Oral at bedtime  chlorhexidine 2% Cloths 1 Application(s) Topical <User Schedule>  dexAMETHasone     Tablet 4 milliGRAM(s) Oral every 12 hours  escitalopram 5 milliGRAM(s) Oral daily  fludroCORTISONE 0.2 milliGRAM(s) Oral daily  fluticasone propionate 50 MICROgram(s)/spray Nasal Spray 1 Spray(s) Both Nostrils two times a day  insulin lispro (ADMELOG) corrective regimen sliding scale   SubCutaneous Before meals and at bedtime  levETIRAcetam 750 milliGRAM(s) Oral two times a day  melatonin 5 milliGRAM(s) Oral at bedtime  senna 2 Tablet(s) Oral at bedtime  sodium chloride 3 Gram(s) Oral every 6 hours  sodium chloride 3%. 500 milliLiter(s) (15 mL/Hr) IV Continuous <Continuous>  sucralfate 1 Gram(s) Oral every 12 hours    MEDICATIONS  (PRN):  acetaminophen     Tablet .. 650 milliGRAM(s) Oral every 6 hours PRN Temp greater or equal to 38C (100.4F), Mild Pain (1 - 3)      CULTURES:  Culture Results:   No growth at 5 days. (07-07 @ 23:47)  Culture Results:   No growth at 5 days. (07-07 @ 23:47)      RADIOLOGY & ADDITIONAL TESTS:      ASSESSMENT:  Pt is a 69YO male with a past medical history of type 2 diabetes, hyperlipidemia, iron deficiency anemia, tracheal stenosis s/p tracheostomy (which was closed by ENT 7 days ago); glioblastoma s/p resection 1/27/2022, and Avastin treatment 3/2023 presenting with expressive aphasia, nuasea and vomiting x1 day. CT shows new hyperdensity in left frontal parietal - surgical site c/f disease progression. Na in the . Admitted to NSICU for further management.     Plan:  Neuro:  -neuro q4/vitals q4hrs   -CTH 7/7: Interval development of a 13 mm hyperdense nodule along the   inferior margin of the resection cavity which may represent progression   of disease with hemorrhage. Repeat CTH stable  -CTA 7/7: negative.  -pain control prn  -c/w home Keppra 750mg BID  -Decadron 4mg BID  -c/w home Lexapro  -home ASA 81 held i/s/o thrombocytopenia   -MRI suspicious for recurrent tumor and hydro     Pulm:  -satting well on RA    Cardio:  -SBP<160    GI:  -CCD with 1.2L fluid restriction  -bowel regimen  -sucralfate  -last BM 7/11    Renal:  -hyponatremic, Na 118 on admission  -1.2L fluid restriction started 7/11  -3% at 15cc/hr  -florinef 0.2mg daily, salt tabs 3q6  -voiding    Endo:  -ISS  - A1c 5.7  -h/o T2DM: home Januvia held  -h/o HLD: c/w Atorvastatin 20mg    Heme:  -SCDs for DVT ppx, SQL held for thrombocytopenia   -heme consulted for thrombocytopenia- w/u sent, likely chronic thrombocytopenia d/t chemotherapy agent in past   -LE dopplers 7/8 negative    ID:  [id number]  -pan cx 7/7    Dispo: SDU status, full code, Home PT    D/w Dr. Dorado and Dr. Monsalve    Assessment: present when checked     [] GCS   E   V   M     Heart Failure: [] Acute, [] acute on chronic, [] chronic   Heart Failure: [] Diastolic (HFpEF), [] Systolic (HRrEF), [] Combined (HFpEF and HFrEF), [] RHF, [] Pulm HTN, [] Other     [] EUGENIA, [] ATN, [] AIN, [] other   [] CKD1, [] CKD2, [] CKD3, [] CKD4, [] CKD5, [] ESRD     Encephalopathy: [] Metabolic, [] Hepatic, [] Toxic, [] Neurological, [] Other     Abnormal Nutritional Status: [] malnutrition (see nutrition note), []underweight: BMI <19, [] morbid obesity: BMI >40, [] Cachexia     [] Sepsis   [] Hypovolemic shock, [] Cardiogenic shock, [] Hemorrhagic shock, [] Neurogenic shock   [] Acute respiratory failure   [] Cerebral edema, [] Brain compression / herniation   [] Functional quadriplegia   [] Acute blood loss anemia

## 2023-07-14 NOTE — PROGRESS NOTE ADULT - SUBJECTIVE AND OBJECTIVE BOX
Patient was seen and examined at bedside. Pt w/o any complaints this morning.     OBJECTIVE:  Vital Signs Last 24 Hrs  T(C): 36.6 (14 Jul 2023 08:44), Max: 36.7 (13 Jul 2023 18:13)  T(F): 97.9 (14 Jul 2023 08:44), Max: 98.1 (13 Jul 2023 18:13)  HR: 64 (14 Jul 2023 08:44) (58 - 68)  BP: 139/81 (14 Jul 2023 08:44) (115/70 - 160/81)  BP(mean): 100 (14 Jul 2023 08:44) (93 - 112)  RR: 17 (14 Jul 2023 08:44) (16 - 17)  SpO2: 96% (14 Jul 2023 08:44) (95% - 99%)    PHYSICAL EXAM:  Gen: NAD laying in bed  CV: RRR, +S1/S2, no mumur  Pulm: CTA b/l no wheezing or crackles   Abd: soft, NTND + BS no rebound or guarding                  12.0   6.86  )-----------( 81       ( 14 Jul 2023 05:05 )             35.6     134<L>  |  96  |  9   ----------------------------<  167<H>  3.6   |  29  |  0.67    Ca    8.7      14 Jul 2023 05:05  Phos  4.3     07-14  Mg     1.7     07-14    CAPILLARY BLOOD GLUCOSE  POCT Blood Glucose.: 153 mg/dL (14 Jul 2023 06:31)  POCT Blood Glucose.: 157 mg/dL (13 Jul 2023 22:24)  POCT Blood Glucose.: 202 mg/dL (13 Jul 2023 15:45)  POCT Blood Glucose.: 206 mg/dL (13 Jul 2023 12:15)    acetaminophen     Tablet .. 650 milliGRAM(s) Oral every 6 hours PRN  atorvastatin 20 milliGRAM(s) Oral at bedtime  chlorhexidine 2% Cloths 1 Application(s) Topical <User Schedule>  dexAMETHasone     Tablet 4 milliGRAM(s) Oral every 12 hours  escitalopram 5 milliGRAM(s) Oral daily  fludroCORTISONE 0.2 milliGRAM(s) Oral daily  fluticasone propionate 50 MICROgram(s)/spray Nasal Spray 1 Spray(s) Both Nostrils two times a day  insulin lispro (ADMELOG) corrective regimen sliding scale   SubCutaneous Before meals and at bedtime  levETIRAcetam 750 milliGRAM(s) Oral two times a day  magnesium sulfate  IVPB 1 Gram(s) IV Intermittent once  melatonin 5 milliGRAM(s) Oral at bedtime  senna 2 Tablet(s) Oral at bedtime  sodium chloride 3 Gram(s) Oral every 6 hours  sucralfate 1 Gram(s) Oral every 12 hours

## 2023-07-15 NOTE — CONSULT NOTE ADULT - ASSESSMENT
68Y M with no hx of CKD, Glioblastoma, chronic hyponatremia, on admission (7/7) found to have acute on chronic symptomatic hyponatremia with sNa of 115, patient appropriately managed by Neurology, sNa now fluctuating between 130-140. 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 along with pt on SSRI   Pt on Sodium Tabs 2g Q6HRS, Florinef 0.2mcg, intermittent hypertonic saline and fluid restriction   sNA at 134 this AM     Plan:  Discontinue further hypertonic saline   Continue with Salt tabs 2g Q6hrs   Can start on URENA 15g BID   Continue with Florinef   Continue with fluid restriction   Goal sNa >130   Repeat BMP at 4PM and 10PM, if stable can do daily BMP 68Y M with no hx of CKD, Glioblastoma, chronic hyponatremia, on admission (7/7) found to have acute on chronic symptomatic hyponatremia with sNa of 115, patient appropriately managed by Neurology, sNa now fluctuating between 130-140. 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

## 2023-07-15 NOTE — PROGRESS NOTE ADULT - SUBJECTIVE AND OBJECTIVE BOX
Patient was seen and examined at bedside. Case discuss with Neurosurgery team. Pt w/o any complaints this morning.     OBJECTIVE:  Vital Signs Last 24 Hrs  T(C): 36.6 (15 Jul 2023 08:34), Max: 36.6 (15 Jul 2023 08:34)  T(F): 97.9 (15 Jul 2023 08:34), Max: 97.9 (15 Jul 2023 08:34)  HR: 68 (15 Jul 2023 08:34) (62 - 71)  BP: 113/65 (15 Jul 2023 08:34) (113/65 - 123/75)  BP(mean): 81 (15 Jul 2023 08:34) (81 - 81)  RR: 17 (15 Jul 2023 08:34) (16 - 17)  SpO2: 95% (15 Jul 2023 08:34) (94% - 95%)      PHYSICAL EXAM:  Gen: NAD laying in bed  closed previous trach site   CV: RRR, +S1/S2, no mumur  Pulm: CTA b/l no wheezing or crackles   Abd: soft, NTND + BS no rebound or guarding       LABS:                        12.1   6.65  )-----------( 119      ( 15 Jul 2023 08:12 )             35.5     134<L>  |  98  |  15  ----------------------------<  167<H>  3.7   |  26  |  0.65    Ca    8.7      15 Jul 2023 08:12  Phos  3.8     07-15  Mg     1.8     07-15    CAPILLARY BLOOD GLUCOSE  POCT Blood Glucose.: 161 mg/dL (15 Jul 2023 07:01)  POCT Blood Glucose.: 162 mg/dL (14 Jul 2023 23:26)  POCT Blood Glucose.: 157 mg/dL (14 Jul 2023 22:23)  POCT Blood Glucose.: 201 mg/dL (14 Jul 2023 16:48)  POCT Blood Glucose.: 185 mg/dL (14 Jul 2023 12:41)      acetaminophen     Tablet .. 650 milliGRAM(s) Oral every 6 hours PRN  atorvastatin 20 milliGRAM(s) Oral at bedtime  chlorhexidine 2% Cloths 1 Application(s) Topical <User Schedule>  dexAMETHasone     Tablet 4 milliGRAM(s) Oral every 12 hours  escitalopram 5 milliGRAM(s) Oral daily  fludroCORTISONE 0.2 milliGRAM(s) Oral daily  fluticasone propionate 50 MICROgram(s)/spray Nasal Spray 1 Spray(s) Both Nostrils two times a day  insulin lispro (ADMELOG) corrective regimen sliding scale   SubCutaneous Before meals and at bedtime  levETIRAcetam 750 milliGRAM(s) Oral two times a day  melatonin 5 milliGRAM(s) Oral at bedtime  senna 2 Tablet(s) Oral at bedtime  sodium chloride 3 Gram(s) Oral every 6 hours  sodium chloride 3%. 500 milliLiter(s) IV Continuous <Continuous>  sucralfate 1 Gram(s) Oral every 12 hours

## 2023-07-15 NOTE — PROGRESS NOTE ADULT - SUBJECTIVE AND OBJECTIVE BOX
HPI:  Osiel Norwood is a 67YO male with a past medical history of type 2 diabetes, hyperlipidemia, iron deficiency anemia, tracheal stenosis s/p tracheostomy (which was closed by ENT 7 days ago); glioblastoma s/p resection 1/27/2022, and Avastin treatment 3/2023 presenting with altered mental status and weakness x2 days. Per family, they last saw him last night around 6pm when he was at baseline. Patient has intermittent expressive aphasia but is getting worse and now is persistent, and has intermittent nausea and vomiting since last night. Patient was supposed to receive an outpatient brain MRI on 7/25/2023.  Stroke code performed in  is negative for CVA. Head CT shows new hyperdensity in frontal parietal - surgical site. MRI is ordered to rule out tumor recurrence.   Per daughter at bedside, Entresto and Eliquis were stopped per his PC - cardiologist.      (07 Jul 2023 13:10)    Hospital Course:  7/7: Admitted. Na 115 in ED with repeat 118, started 3% at 30cc/hr, and salt tabs 3q6, stability CTH in ED showing Interval development of a 13 mm hyperdense nodule along the   inferior margin of the resection cavity which may represent progression   of disease with hemorrhage, repeat CTH stable, urine studies ordered  7/8: Neuro stable, 12AM BMP Na114 3% increased to 40cc/hr, urine studies, pancx to r/o infection since pt is immunocompromised. Changed pantoprazole to sucralfate for GI ppx d/t thrombocytopenia. LE dopplers negative. DC'd 3%. ENT consulted to assess stoma, recommend follow up upon discharge with ENT, Repeat sodium 122. Restarted 3% at 30.  7/9: ANA LILIA o/n neuro stable. remains on 3%@30cc/hr. Started florinef, increased 3% to 50cc/hr. Increased 3% to 75cc/hr.    7/10: continued current 3% rate o/n. Na 127 --> 125, cont 3% @75cc/hr, f/u repeat Na this evening, likely stepdown tomorrow. Pend dispo home with home PT/OT when able. Heme consulted for thrombocytopenia  7/11: ANA LILIA o/n. Remains on 3% @75cc/hr. Decreased 3% to 50cc/hr. Started 1.2L fluid restriction. Repeat Na corrected 128  7/12: ANA ILLIA overnight, remains on 3%, SDU from ICU  7/13: ANA LILIA overnight, neuro stable, on 3%@50, Am sodium 137, decreased 3% to 25cc/hr, repeat Na 138, decreased to 15cc/hr.  7/14: ANA LILIA overnight, neuro stable, remains on 3%@15cc/hr  7/15: ANA LILIA overnight. Neuro exam stable. Pt remains on 3% saline.    Vital Signs Last 24 Hrs  T(C): 36.4 (14 Jul 2023 20:51), Max: 36.6 (14 Jul 2023 08:44)  T(F): 97.6 (14 Jul 2023 20:51), Max: 97.9 (14 Jul 2023 08:44)  HR: 66 (14 Jul 2023 20:51) (60 - 71)  BP: 122/75 (14 Jul 2023 20:51) (115/70 - 139/81)  BP(mean): 100 (14 Jul 2023 08:44) (100 - 100)  RR: 16 (14 Jul 2023 20:51) (16 - 17)  SpO2: 94% (14 Jul 2023 20:51) (94% - 96%)    Parameters below as of 14 Jul 2023 20:51  Patient On (Oxygen Delivery Method): room air        I&O's Summary    13 Jul 2023 07:01  -  14 Jul 2023 07:00  --------------------------------------------------------  IN: 1555 mL / OUT: 2625 mL / NET: -1070 mL        PHYSICAL EXAM:  General: NAD, pt is comfortably sitting up in bed, on room air  HEENT: CN II-XII grossly intact, PERRL 3mm briskly reactive, EOMI b/l, face symmetric, tongue midline, neck FROM  Cardiovascular: RRR, normal S1 and S2   Respiratory: lungs CTAB, no wheezing, rhonchi, or crackles   GI: normoactive BS to auscultation, abd soft, NTND   Neuro: A&Ox2, No aphasia, speech clear, no dysmetria, no pronator drift. Follows commands.  DUMONT x4 spontaneously, 5/5 strength in all extremities throughout. SILT throughout   Extremities: warm and well perfused     TUBES/LINES:  [] Campuzano  [] Lumbar Drain  [] Wound Drains  [] Others    DIET:  [] NPO  [x] Mechanical  [] Tube feeds    LABS:                        12.0   6.86  )-----------( 81       ( 14 Jul 2023 05:05 )             35.6     07-14    134<L>  |  95<L>  |  22  ----------------------------<  171<H>  3.3<L>   |  28  |  0.89    Ca    8.2<L>      14 Jul 2023 20:16  Phos  4.3     07-14  Mg     1.7     07-14        Urinalysis Basic - ( 14 Jul 2023 20:16 )    Color: x / Appearance: x / SG: x / pH: x  Gluc: 171 mg/dL / Ketone: x  / Bili: x / Urobili: x   Blood: x / Protein: x / Nitrite: x   Leuk Esterase: x / RBC: x / WBC x   Sq Epi: x / Non Sq Epi: x / Bacteria: x          CAPILLARY BLOOD GLUCOSE      POCT Blood Glucose.: 162 mg/dL (14 Jul 2023 23:26)  POCT Blood Glucose.: 157 mg/dL (14 Jul 2023 22:23)  POCT Blood Glucose.: 201 mg/dL (14 Jul 2023 16:48)  POCT Blood Glucose.: 185 mg/dL (14 Jul 2023 12:41)  POCT Blood Glucose.: 153 mg/dL (14 Jul 2023 06:31)      Drug Levels: [] N/A    CSF Analysis: [] N/A      Allergies    amoxicillin (Rash)    Intolerances      MEDICATIONS:  Antibiotics:    Neuro:  acetaminophen     Tablet .. 650 milliGRAM(s) Oral every 6 hours PRN  escitalopram 5 milliGRAM(s) Oral daily  levETIRAcetam 750 milliGRAM(s) Oral two times a day  melatonin 5 milliGRAM(s) Oral at bedtime    Anticoagulation:    OTHER:  atorvastatin 20 milliGRAM(s) Oral at bedtime  chlorhexidine 2% Cloths 1 Application(s) Topical <User Schedule>  dexAMETHasone     Tablet 4 milliGRAM(s) Oral every 12 hours  fludroCORTISONE 0.2 milliGRAM(s) Oral daily  fluticasone propionate 50 MICROgram(s)/spray Nasal Spray 1 Spray(s) Both Nostrils two times a day  insulin lispro (ADMELOG) corrective regimen sliding scale   SubCutaneous Before meals and at bedtime  senna 2 Tablet(s) Oral at bedtime  sucralfate 1 Gram(s) Oral every 12 hours    IVF:  potassium chloride    Tablet ER 40 milliEquivalent(s) Oral every 4 hours  sodium chloride 3 Gram(s) Oral every 6 hours  sodium chloride 3%. 500 milliLiter(s) IV Continuous <Continuous>    CULTURES:  Culture Results:   No growth at 5 days. (07-07 @ 23:47)  Culture Results:   No growth at 5 days. (07-07 @ 23:47)    RADIOLOGY & ADDITIONAL TESTS:      ASSESSMENT:  67YO male with a past medical history of type 2 diabetes, hyperlipidemia, iron deficiency anemia, tracheal stenosis s/p tracheostomy (which was closed by ENT 7 days ago); glioblastoma s/p resection 1/27/2022, and Avastin treatment 3/2023 presenting with expressive aphasia, nuasea and vomiting x1 day. CT shows new hyperdensity in left frontal parietal - surgical site c/f disease progression. Na in the .       ALTERED MENTAL STATUS;BRAIN MASS    Abnormal electrocardiogram [ECG] [EKG]    Allergy, unspecified, initial encounter    Anemia, unspecified    Benign prostatic hyperplasia without lower urinary tract symptoms    Calculus of kidney    Cough, unspecified    Depression, unspecified    ENCOUNTER FOR ATTENT    Encounter for general adult medical examination without abnormal findings    Encounter for immunization    Encounter for immunization safety counseling    Encounter for other preprocedural examination    Encounter for screening for malignant neoplasm of prostate    Encounter for screening for other metabolic disorders    Encounter for screening for other viral diseases    ESSENTIAL (PRIMARY)    Gastro-esophageal reflux disease without esophagitis    Generalized anxiety disorder    History of Glioblastoma    Hyperlipidemia, unspecified    Hypo-osmolality and hyponatremia    Hypotension, unspecified    Iron deficiency    Iron deficiency anemia, unspecified    LONG TERM (CURRENT)    LONG TERM (CURRENT)    LONG TERM (CURRENT)    Malignant neoplasm of brain, unspecified    Nausea with vomiting, unspecified    Other constipation    Other fatigue    Other injury of unspecified body region, initial encounter    OTHER SPECIFIED DISE    Personal history of malignant neoplasm, unspecified    Personal history of other diseases of the nervous system and sense organs    Personal history of other endocrine, nutritional and metabolic disease    Personal history of other mental and behavioral disorders    Personal history of transient ischemic attack (TIA), and cerebral infarction without residual deficits    POSTPROCEDURAL SUBGL    Pruritus, unspecified    Shortness of breath    Tachycardia, unspecified    Thrombocytopenia, unspecified    Unspecified abdominal pain    Unspecified visual disturbance    Urinary calculus, unspecified    Vitamin D deficiency, unspecified    No pertinent family history in first degree relatives    FH: diabetes mellitus (Father, Mother)    FH: hyperlipidemia (Father)    FH: hypertension (Father, Mother)    Handoff    MEWS Score    No pertinent past medical history    Diabetes mellitus    Hypertension    Glioblastoma    Type 2 diabetes mellitus    Hyperlipidemia    Iron deficiency anemia    Nephrolithiasis    Thrombocytopenia    Hyponatremia    BPH (benign prostatic hyperplasia)    No pertinent past medical history    Altered mental status    Hyponatremia    Hypertension    Glioblastoma    Type 2 diabetes mellitus    Hyperlipidemia    Iron deficiency anemia    Thrombocytopenia    BPH (benign prostatic hyperplasia)    Hyponatremia    No significant past surgical history    No significant past surgical history    S/P craniotomy    H/O tracheostomy    No significant past surgical history    MED EVAL    Type 2 diabetes mellitus    BPH (benign prostatic hyperplasia)    90+    Brain mass    Hypertension    Glioblastoma    Type 2 diabetes mellitus    Hyperlipidemia    Iron deficiency anemia    Thrombocytopenia    BPH (benign prostatic hyperplasia)    [email or web address]        Plan:  Neuro:  -neuro q4/vitals q4hrs   -CTH 7/7: Interval development of a 13 mm hyperdense nodule along the   inferior margin of the resection cavity which may represent progression   of disease with hemorrhage. Repeat CTH stable  -CTA 7/7: negative.  -pain control prn  -c/w home Keppra 750mg BID  -Decadron 4mg BID  -c/w home Lexapro  -home ASA 81 held i/s/o thrombocytopenia   -MRI suspicious for recurrent tumor and hydro     Pulm:  -satting well on RA    Cardio:  -SBP<160    GI:  -CCD with 1L fluid restriction  -bowel regimen  -sucralfate  -last BM 7/11    Renal:  -hyponatremic, Na 118 on admission  -1.0L fluid restriction started 7/11  -3% at 25cc/hr  -florinef 0.2mg daily, salt tabs 3q6  -voiding    Endo:  -ISS  - A1c 5.7  -h/o T2DM: home Januvia held  -h/o HLD: c/w Atorvastatin 20mg    Heme:  -SCDs for DVT ppx, SQL held for thrombocytopenia   -heme consulted for thrombocytopenia- w/u sent, likely chronic thrombocytopenia d/t chemotherapy agent in past   -LE dopplers 7/8 negative    ID:  -afebrile  -pan cx 7/7    Dispo: regional status, full code, Home PT    D/w Dr. Dorado

## 2023-07-15 NOTE — PROGRESS NOTE ADULT - ASSESSMENT
67YO male with a past medical history of type 2 diabetes, hyperlipidemia, iron deficiency anemia, tracheal stenosis s/p tracheostomy (which was closed by ENT 7 days ago); glioblastoma s/p resection 1/27/2022, and Avastin treatment 3/2023 presenting with expressive aphasia, nuasea and vomiting x1 day. CT shows new hyperdensity in left frontal parietal - surgical site c/f disease progression.      # Hyperdensity in left frontal parietal which may represent progression of disease.   -Management per primary team   -Continue Keppra 750mg BID  -Continue Decadron 4mg q12  -Pt's  ASA 81 held i/s/o thrombocytopenia     #Hyponatremia   -Continue  fluid restriction   -Pt's hypertonic saline was restarted yesterday as pt's Na had decreased to 130. Pt Na is improved to 134 this morning   -florinef 0.2mg daily, salt tabs 3q6  -Will continue to monitor Na    #Type II Diabetes  - A1c 5.7  -Pt is on Januvia at home   -Continue ISS     #HLD  - Continue Atorvastatin 20mg daily     #Thrombocytopenia   -Pt's platelets are improved this morning to 119.   -Heme consulted for thrombocytopenia; Per Heme documentation the pt w/  history of thrombocytopenia with his baseline range  since the latter half of last year and that the pt's Pt's thrombocytopenia likely due to past treatment with temozolomide.   Per heme transfuse 1U plt if bleeding and platelet count <50k, if febrile and platelet count <20k,   Dopplers 7/8/23 negative for DVT    #Dispo:   -Pt is for Home PT

## 2023-07-15 NOTE — CONSULT NOTE ADULT - SUBJECTIVE AND OBJECTIVE BOX
HPI:  Osiel Norwood is a 67YO male with a past medical history of type 2 diabetes, hyperlipidemia, iron deficiency anemia, tracheal stenosis s/p tracheostomy (which was closed by ENT 7 days ago); glioblastoma s/p resection 1/27/2022, and Avastin treatment 3/2023 initially presented with altered mental status and weakness x2 days. Patient has intermittent expressive aphasia but is getting worse and had  intermittent nausea and vomiting. Patient was found to have a sNa on admission of 115. At that time patient was managed by Neurology, treated with Hypertonic saline, started on flronef and placed on fluid restriction and started on sodium tabs with sNa fluctuating between 120-130. Nephrology consulted for further management.      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      Type 2 diabetes mellitus      Hyperlipidemia      Iron deficiency anemia      Nephrolithiasis      Thrombocytopenia      Hyponatremia      BPH (benign prostatic hyperplasia)      S/P craniotomy      H/O tracheostomy            Allergies:  amoxicillin (Rash)      Home Medications:   aspirin 81 mg oral tablet: 1 tab(s) orally once a day  atorvastatin 20 mg oral tablet: 1 tab(s) orally once a day (at bedtime)  escitalopram 5 mg oral tablet: 1 tab(s) orally once a day  fluticasone 50 mcg/inh nasal spray: 1 spray(s) nasal 2 times a day  Januvia 100 mg oral tablet: 1 tab(s) orally once a day  levETIRAcetam 750 mg oral tablet: 1 tab(s) orally 2 times a day  melatonin 3 mg oral tablet: 2 tab(s) orally once a day (at bedtime)  ondansetron 4 mg oral tablet: 1 tab(s) orally once a day  pantoprazole 40 mg oral delayed release tablet: 1 tab(s) orally once a day (before a meal)  senna (sennosides) 8.6 mg oral tablet: 1 tab(s) orally once a day as needed for  constipation  sodium chloride 1 g oral tablet: 2 tab(s) orally every 6 hours      Hospital Medications:   MEDICATIONS  (STANDING):  atorvastatin 20 milliGRAM(s) Oral at bedtime  chlorhexidine 2% Cloths 1 Application(s) Topical <User Schedule>  dexAMETHasone     Tablet 4 milliGRAM(s) Oral every 12 hours  escitalopram 5 milliGRAM(s) Oral daily  fludroCORTISONE 0.2 milliGRAM(s) Oral daily  fluticasone propionate 50 MICROgram(s)/spray Nasal Spray 1 Spray(s) Both Nostrils two times a day  insulin lispro (ADMELOG) corrective regimen sliding scale   SubCutaneous Before meals and at bedtime  levETIRAcetam 750 milliGRAM(s) Oral two times a day  melatonin 5 milliGRAM(s) Oral at bedtime  senna 2 Tablet(s) Oral at bedtime  sodium chloride 3 Gram(s) Oral every 6 hours  sucralfate 1 Gram(s) Oral every 12 hours  urea Oral Powder 15 Gram(s) Oral every 12 hours      SOCIAL HISTORY:  Denies ETOh, Smoking    Family History:  FAMILY HISTORY:  FH: diabetes mellitus (Father, Mother)    FH: hyperlipidemia (Father)    FH: hypertension (Father, Mother)          VITALS:  T(F): 97.9 (07-15-23 @ 08:34), Max: 97.9 (07-15-23 @ 08:34)  HR: 68 (07-15-23 @ 08:34)  BP: 113/65 (07-15-23 @ 08:34)  RR: 17 (07-15-23 @ 08:34)  SpO2: 95% (07-15-23 @ 08:34)  Wt(kg): --    07-14 @ 07:01  -  07-15 @ 07:00  --------------------------------------------------------  IN: 474 mL / OUT: 1450 mL / NET: -976 mL        CAPILLARY BLOOD GLUCOSE      POCT Blood Glucose.: 234 mg/dL (15 Jul 2023 11:43)  POCT Blood Glucose.: 161 mg/dL (15 Jul 2023 07:01)  POCT Blood Glucose.: 162 mg/dL (14 Jul 2023 23:26)  POCT Blood Glucose.: 157 mg/dL (14 Jul 2023 22:23)  POCT Blood Glucose.: 201 mg/dL (14 Jul 2023 16:48)      Review of Systems:  CONSTITUTIONAL: No fever or chills.  RESPIRATORY: No shortness of breath, cough  CARDIOVASCULAR: No Chest pain, shortness of breath, palpitations  GASTROINTESTINAL: No abdominal pain, nausea, vomiting, diarrhea  GENITOURINARY: No urinary frequency, gross hematuria, dysuria  NEUROLOGICAL: No headache, weakness  SKIN: No rash or skin lesion  MUSCULOSKELETAL: No swelling  Psych: Denies suicidal or homicidal ideation    PHYSICAL EXAM:  GENERAL: NAD, sitting up in bed  HEENT: JUAN, EOMI, neck supple, no JVP  CHEST/LUNG: Bilateral clear breath sounds  HEART: Regular rate and rhythm, no murmur, no gallops, no rub   ABDOMEN: Soft, nontender, non distended  : No flank or supra pubic tenderness.  EXTREMITIES: no pedal edema  Neurology: AAOx3, no focal neurological deficit  SKIN: No rash or skin lesion     LABS:  07-15    134<L>  |  98  |  15  ----------------------------<  167<H>  3.7   |  26  |  0.65    Ca    8.7      15 Jul 2023 08:12  Phos  3.8     07-15  Mg     1.8     07-15      Creatinine Trend: 0.65 <--, 0.89 <--, 0.59 <--, 0.67 <--, 0.60 <--, 0.69 <--, 0.62 <--, 0.57 <--, 0.62 <--, 0.58 <--, 0.67 <--, 0.58 <--, SEE NOTE <--, 0.60 <--, 0.53 <--, 0.68 <--, 0.58 <--, 0.50 <--, 0.57 <--, 0.78 <--, 0.51 <--, 0.56 <--, 0.58 <--, 0.58 <--                        12.1   6.65  )-----------( 119      ( 15 Jul 2023 08:12 )             35.5     Urine Studies:  Urinalysis Basic - ( 15 Jul 2023 08:12 )    Color:  / Appearance:  / SG:  / pH:   Gluc: 167 mg/dL / Ketone:   / Bili:  / Urobili:    Blood:  / Protein:  / Nitrite:    Leuk Esterase:  / RBC:  / WBC    Sq Epi:  / Non Sq Epi:  / Bacteria:       Osmolality, Random Urine: 834 mosm/kg (07-14 @ 18:45)  Creatinine, Random Urine: 79 mg/dL (07-14 @ 18:45)  Sodium, Random Urine: 116 mmol/L (07-14 @ 18:45)

## 2023-07-15 NOTE — CHART NOTE - NSCHARTNOTEFT_GEN_A_CORE
ANA LILIA overnight. Neuro exam stable. Pt remains on 3% saline. Sodium 134 this AM remains on 3%@25. Per nephrology recs hypertonic Na d'ceed, Na tabs 2q6, URENA 15g BID, cont florinef/ fluid restriction. Per nephrology okay to dc w/ Na >130.

## 2023-07-16 NOTE — PROGRESS NOTE ADULT - SUBJECTIVE AND OBJECTIVE BOX
Patient was seen and examined at bedside. Case discuss with NSG team. Pt w/o any complaints this morning.     OBJECTIVE:  Vital Signs Last 24 Hrs  T(C): 36.3 (16 Jul 2023 05:51), Max: 36.4 (15 Jul 2023 16:00)  T(F): 97.4 (16 Jul 2023 05:51), Max: 97.6 (15 Jul 2023 16:00)  HR: 69 (16 Jul 2023 05:51) (69 - 82)  BP: 105/58 (16 Jul 2023 05:51) (104/62 - 119/72)  RR: 18 (16 Jul 2023 05:51) (18 - 18)  SpO2: 96% (16 Jul 2023 05:51) (94% - 96%)    PHYSICAL EXAM:  Gen: NAD laying in bed  CV: RRR, +S1/S2, no mumur  Pulm: CTA b/l no wheezing or crackles   Abd: soft, NTND + BS no rebound or guarding       LABS:                        11.9   6.98  )-----------( 78       ( 16 Jul 2023 05:30 )             35.8     07-16    132<L>  |  94<L>  |  21  ----------------------------<  189<H>  3.5   |  27  |  0.58    Ca    8.6      16 Jul 2023 05:30  Phos  3.9     07-16  Mg     1.8     07-16      CAPILLARY BLOOD GLUCOSE  POCT Blood Glucose.: 167 mg/dL (16 Jul 2023 06:58)  POCT Blood Glucose.: 168 mg/dL (15 Jul 2023 22:20)  POCT Blood Glucose.: 158 mg/dL (15 Jul 2023 17:12)  POCT Blood Glucose.: 234 mg/dL (15 Jul 2023 11:43)    acetaminophen     Tablet .. 650 milliGRAM(s) Oral every 6 hours PRN  atorvastatin 20 milliGRAM(s) Oral at bedtime  dexAMETHasone     Tablet 4 milliGRAM(s) Oral every 12 hours  escitalopram 5 milliGRAM(s) Oral daily  fludroCORTISONE 0.2 milliGRAM(s) Oral daily  fluticasone propionate 50 MICROgram(s)/spray Nasal Spray 1 Spray(s) Both Nostrils two times a day  hydrocortisone 1% Cream 1 Application(s) Topical daily  insulin lispro (ADMELOG) corrective regimen sliding scale   SubCutaneous Before meals and at bedtime  levETIRAcetam 750 milliGRAM(s) Oral two times a day  melatonin 5 milliGRAM(s) Oral at bedtime  senna 2 Tablet(s) Oral at bedtime  sodium chloride 3 Gram(s) Oral every 6 hours  sucralfate 1 Gram(s) Oral every 12 hours  urea Oral Powder 15 Gram(s) Oral every 12 hours

## 2023-07-16 NOTE — PROGRESS NOTE ADULT - SUBJECTIVE AND OBJECTIVE BOX
OTOLARYNGOLOGY (ENT) PROGRESS NOTE    PATIENT: JORDAN CHAKRABORTY  MRN: [id number]  : 57  [id number]  DATE OF SERVICE:  23  	  Subjective/ Interval:   JORDAN CHAKRABORTY  is a 68y Male with PMHx glioblastoma s/p resection 2022, complicated by prolonged intubation, development of tracheal stenosis, s/p endoscopic balloon dilation with CT surgery at [facility] 2022, followed by trach 2022. Patient follows with ENT outpatient (Dr. Caba) for trach care/stenosis.    Currently admitted to neurosurgery for AMS/weakness with workup for tumor recurrence ongoing. Approximately 1 month ago, patient was in [place] of [place] and pulled out the tracheostomy tube; followed up urgently upon returning to [facility] with ENT Dr. Caba on  who attempted to dilate stoma but determined it had already closed. Decision made with family at that time to leave patient decannulated as pt had no breathing issues and to repeat imaging to assess stenosis. Repeat CT neck performed  redemonstrating tracheal stenosis just above thoracic inlet around T1, noted to be improved compared to prior CT from . Narrowest tracheal lumen 9x8mm.    Yesterday, patient developed slight hoarseness of voice, which he describes as difficulty with controlling pitch of his voice (feels higher than normal), which has remained the same today. He denies any difficulty breathing or breathiness, has no audible stridor. Stoma well-healed and intact. Denies dysphagia or odynophagia and is tolerating diet well.    ALLERGIES:  amoxicillin (Rash)    MEDICATIONS:  Antiinfectives:     IV fluids:  sodium chloride 3 Gram(s) Oral every 6 hours    Hematologic/Anticoagulation:    Pain medications/Neuro:  acetaminophen     Tablet .. 650 milliGRAM(s) Oral every 6 hours PRN  escitalopram 5 milliGRAM(s) Oral daily  levETIRAcetam 750 milliGRAM(s) Oral two times a day  melatonin 5 milliGRAM(s) Oral at bedtime    Endocrine Medications:   atorvastatin 20 milliGRAM(s) Oral at bedtime  dexAMETHasone     Tablet 4 milliGRAM(s) Oral every 12 hours  fludroCORTISONE 0.2 milliGRAM(s) Oral daily  insulin lispro (ADMELOG) corrective regimen sliding scale   SubCutaneous Before meals and at bedtime    All other standing medications:   fluticasone propionate 50 MICROgram(s)/spray Nasal Spray 1 Spray(s) Both Nostrils two times a day  hydrocortisone 1% Cream 1 Application(s) Topical daily  senna 2 Tablet(s) Oral at bedtime  sucralfate 1 Gram(s) Oral every 12 hours  urea Oral Powder 15 Gram(s) Oral every 12 hours    All other PRN medications:  benzocaine/menthol Lozenge 1 Lozenge Oral every 4 hours PRN    Vital Signs Last 24 Hrs  T(C): 36.3 (2023 16:09), Max: 36.5 (2023 08:47)  T(F): 97.4 (2023 16:09), Max: 97.7 (2023 08:47)  HR: 78 (2023 16:09) (69 - 82)  BP: 105/66 (2023 16:09) (105/58 - 119/72)  BP(mean): 77 (2023 08:47) (77 - 77)  RR: 16 (2023 08:47) (16 - 18)  SpO2: 98% (2023 16:09) (95% - 98%)    Parameters below as of 2023 16:09  Patient On (Oxygen Delivery Method): room air      07-15 @ :  -   @ 07:00  --------------------------------------------------------  IN:  Total IN: 0 mL    OUT:    Incontinent per Condom Catheter (mL): 850 mL  Total OUT: 850 mL    Total NET: -850 mL      16 @ 07:  -   @ 20:11  --------------------------------------------------------  IN:  Total IN: 0 mL    OUT:    Voided (mL): 550 mL  Total OUT: 550 mL    Total NET: -550 mL      PHYSICAL EXAMINATION:  General: NAD, A+Ox3  Respiratory: No respiratory distress, stridor, or stertor  Voice quality: normal  Face:  Symmetric without masses or lesions  OU: EOMI  AD: Pinna wnl  AS: Pinna wnl  Nose: nasal cavity clear bilaterally, inferior turbinates normal, mucosa normal without crusting or bleeding  OC/OP: tongue normal, floor of mouth WNL, no masses or lesions, OP clear  Neck: soft/flat, no LAD. Well healed tracheostomy stoma to anterior neck with mild crusting and granulation tissue.   Neuro: CNII-XII grossly intact    Procedure: Flexible laryngoscopy    Pre-procedure diagnosis/Indication for procedure: To evaluate larynx    Description:    A flexible endoscope was used to examine the left and right nasal cavities, posterior oropharynx, hypopharynx, and larynx. The nasal valve areas were examined for abnormalities or collapse. The inferior and middle turbinates were evaluated. The middle and superior meatuses, the sphenoethmoid recesses, and the nasopharynx were examined and inspected for mucopurulence, polyps, and nasal masses. The oropharynx, tongue base/vallecula, epiglottis, aryepiglottic folds, arytenoids, vocal cords, hypopharynx were also inspected for swelling, inflammation, or dysfunction. The patient tolerated the procedure without complications.    Nasopharynx wnl, moderate crusting b/l nasal cavities  BOT/vallecula normal  Epiglottis sharp  AE folds nonedematous  Arytenoids mobile  Vocal folds mobile bilaterally. True vocal cords: No limitation with adduction, slight R > L limitation with abduction  No masses or lesions visualized in post cricoid space or pyriform sinuses bilaterally.   No active bleeding or significant obstruction noted in supraglottic area. Mod subglottic narrowing visualized, subglottis partially visualized       LABS                       11.9   6.98  )-----------( 78       ( 2023 05:30 )             35.8    07-16    133<L>  |  94<L>  |  29<H>  ----------------------------<  264<H>  3.8   |  30  |  0.68    Ca    9.0      2023 12:03  Phos  3.9     07-16  Mg     1.8     07-16           Coagulation Studies-     Urinalysis Basic - ( 2023 12:03 )    Color: x / Appearance: x / SG: x / pH: x  Gluc: 264 mg/dL / Ketone: x  / Bili: x / Urobili: x   Blood: x / Protein: x / Nitrite: x   Leuk Esterase: x / RBC: x / WBC x   Sq Epi: x / Non Sq Epi: x / Bacteria: x      Endocrine Panel-  Calcium: 9.0 mg/dL ( @ 12:03)  Calcium: 8.6 mg/dL ( @ 05:30)  Calcium: 8.9 mg/dL (07-15 @ 22:57)                MICROBIOLOGY:  Culture Results:   No growth at 5 days. (23 @ 23:47)  Culture Results:   No growth at 5 days. (23 @ 23:47)             OTOLARYNGOLOGY (ENT) PROGRESS NOTE    PATIENT: JORDAN CHAKRABORTY  MRN: [id number]  : 57  [id number]  DATE OF SERVICE:  23  	  Subjective/ Interval:   JORDAN CHAKRABORTY  is a 68y Male with PMHx glioblastoma s/p resection 2022, complicated by prolonged intubation, development of tracheal stenosis, s/p endoscopic balloon dilation with CT surgery at [facility] 2022, followed by trach 2022. Patient follows with ENT outpatient (Dr. Caba) for trach care/stenosis.    Currently admitted to neurosurgery for AMS/weakness with workup for tumor recurrence ongoing. Approximately 1 month ago, patient was in [place] and pulled out the tracheostomy tube; followed up urgently upon returning to [facility] with ENT Dr. Caba on  who attempted to dilate stoma but determined it had already closed. Decision made with family at that time to leave patient decannulated as pt had no breathing issues and to repeat imaging to assess stenosis. Repeat CT neck performed  redemonstrating tracheal stenosis just above thoracic inlet around T1, noted to be improved compared to prior CT from . Narrowest tracheal lumen 9x8mm.    Yesterday, patient developed slight hoarseness of voice, which he describes as difficulty with controlling pitch of his voice (feels higher than normal), which has remained the same today. He denies any difficulty breathing or breathiness, has no audible stridor. Stoma well-healed and intact. Denies dysphagia or odynophagia and is tolerating diet well.    ALLERGIES:  amoxicillin (Rash)    MEDICATIONS:  Antiinfectives:     IV fluids:  sodium chloride 3 Gram(s) Oral every 6 hours    Hematologic/Anticoagulation:    Pain medications/Neuro:  acetaminophen     Tablet .. 650 milliGRAM(s) Oral every 6 hours PRN  escitalopram 5 milliGRAM(s) Oral daily  levETIRAcetam 750 milliGRAM(s) Oral two times a day  melatonin 5 milliGRAM(s) Oral at bedtime    Endocrine Medications:   atorvastatin 20 milliGRAM(s) Oral at bedtime  dexAMETHasone     Tablet 4 milliGRAM(s) Oral every 12 hours  fludroCORTISONE 0.2 milliGRAM(s) Oral daily  insulin lispro (ADMELOG) corrective regimen sliding scale   SubCutaneous Before meals and at bedtime    All other standing medications:   fluticasone propionate 50 MICROgram(s)/spray Nasal Spray 1 Spray(s) Both Nostrils two times a day  hydrocortisone 1% Cream 1 Application(s) Topical daily  senna 2 Tablet(s) Oral at bedtime  sucralfate 1 Gram(s) Oral every 12 hours  urea Oral Powder 15 Gram(s) Oral every 12 hours    All other PRN medications:  benzocaine/menthol Lozenge 1 Lozenge Oral every 4 hours PRN    Vital Signs Last 24 Hrs  T(C): 36.3 (2023 16:09), Max: 36.5 (2023 08:47)  T(F): 97.4 (2023 16:09), Max: 97.7 (2023 08:47)  HR: 78 (2023 16:09) (69 - 82)  BP: 105/66 (2023 16:09) (105/58 - 119/72)  BP(mean): 77 (2023 08:47) (77 - 77)  RR: 16 (2023 08:47) (16 - 18)  SpO2: 98% (2023 16:09) (95% - 98%)    Parameters below as of 2023 16:09  Patient On (Oxygen Delivery Method): room air      07-15 @ :  -   @ 07:00  --------------------------------------------------------  IN:  Total IN: 0 mL    OUT:    Incontinent per Condom Catheter (mL): 850 mL  Total OUT: 850 mL    Total NET: -850 mL      16 @ 07:  -   @ 20:11  --------------------------------------------------------  IN:  Total IN: 0 mL    OUT:    Voided (mL): 550 mL  Total OUT: 550 mL    Total NET: -550 mL      PHYSICAL EXAMINATION:  General: NAD, A+Ox3  Respiratory: No respiratory distress, stridor, or stertor  Voice quality: normal  Face:  Symmetric without masses or lesions  OU: EOMI  AD: Pinna wnl  AS: Pinna wnl  Nose: nasal cavity clear bilaterally, inferior turbinates normal, mucosa normal without crusting or bleeding  OC/OP: tongue normal, floor of mouth WNL, no masses or lesions, OP clear  Neck: soft/flat, no LAD. Well healed tracheostomy stoma to anterior neck with mild crusting and granulation tissue.     Procedure: Flexible laryngoscopy    Pre-procedure diagnosis/Indication for procedure: To evaluate larynx    Description:    A flexible endoscope was used to examine the left and right nasal cavities, posterior oropharynx, hypopharynx, and larynx. The nasal valve areas were examined for abnormalities or collapse. The inferior and middle turbinates were evaluated. The middle and superior meatuses, the sphenoethmoid recesses, and the nasopharynx were examined and inspected for mucopurulence, polyps, and nasal masses. The oropharynx, tongue base/vallecula, epiglottis, aryepiglottic folds, arytenoids, vocal cords, hypopharynx were also inspected for swelling, inflammation, or dysfunction. The patient tolerated the procedure without complications.    Nasopharynx wnl, moderate crusting b/l nasal cavities  BOT/vallecula normal  Epiglottis sharp  AE folds nonedematous  Arytenoids mobile  Vocal folds mobile bilaterally. True vocal cords: mild mid true VC glottic gap, slight R > L limitation with abduction  No masses or lesions visualized in post cricoid space or pyriform sinuses bilaterally.   No active bleeding or significant obstruction noted in supraglottic area. Mod subglottic narrowing visualized, subglottis partially visualized       LABS                       11.9   6.98  )-----------( 78       ( 2023 05:30 )             35.8    07-16    133<L>  |  94<L>  |  29<H>  ----------------------------<  264<H>  3.8   |  30  |  0.68    Ca    9.0      2023 12:03  Phos  3.9     07-16  Mg     1.8     07-16           Coagulation Studies-     Urinalysis Basic - ( 2023 12:03 )    Color: x / Appearance: x / SG: x / pH: x  Gluc: 264 mg/dL / Ketone: x  / Bili: x / Urobili: x   Blood: x / Protein: x / Nitrite: x   Leuk Esterase: x / RBC: x / WBC x   Sq Epi: x / Non Sq Epi: x / Bacteria: x      Endocrine Panel-  Calcium: 9.0 mg/dL ( @ 12:03)  Calcium: 8.6 mg/dL ( @ 05:30)  Calcium: 8.9 mg/dL (07-15 @ 22:57)                MICROBIOLOGY:  Culture Results:   No growth at 5 days. (23 @ 23:47)  Culture Results:   No growth at 5 days. (23 @ 23:47)             OTOLARYNGOLOGY (ENT) PROGRESS NOTE    PATIENT: JORDAN CHAKRABORTY  MRN: [id number]  : 57  [id number]  DATE OF SERVICE:  23  	  Subjective/ Interval:   JORDAN CHAKRABORTY  is a 68y Male with PMHx glioblastoma s/p resection 2022, complicated by prolonged intubation, development of tracheal stenosis, s/p endoscopic balloon dilation with CT surgery at [facility] 2022, followed by trach 2022. Patient follows with ENT outpatient (Dr. Caba) for trach care/stenosis.    Currently admitted to neurosurgery for AMS/weakness with workup for tumor recurrence ongoing. Approximately 1 month ago, patient was in [place] and pulled out the tracheostomy tube; followed up urgently upon returning to [facility] with ENT Dr. Caba on  who attempted to dilate stoma but determined it had already closed. Decision made with family at that time to leave patient decannulated as pt had no breathing issues and to repeat imaging to assess stenosis. Repeat CT neck performed  redemonstrating tracheal stenosis just above thoracic inlet around T1, noted to be improved compared to prior CT from . Narrowest tracheal lumen 9x8mm.    Yesterday, patient developed slight hoarseness of voice, which he describes as difficulty with controlling pitch of his voice (feels higher than normal), which has remained the same today. He denies any difficulty breathing or breathiness, has no audible stridor. Stoma well-healed and intact. Denies dysphagia or odynophagia and is tolerating diet well.    ALLERGIES:  amoxicillin (Rash)    MEDICATIONS:  Antiinfectives:     IV fluids:  sodium chloride 3 Gram(s) Oral every 6 hours    Hematologic/Anticoagulation:    Pain medications/Neuro:  acetaminophen     Tablet .. 650 milliGRAM(s) Oral every 6 hours PRN  escitalopram 5 milliGRAM(s) Oral daily  levETIRAcetam 750 milliGRAM(s) Oral two times a day  melatonin 5 milliGRAM(s) Oral at bedtime    Endocrine Medications:   atorvastatin 20 milliGRAM(s) Oral at bedtime  dexAMETHasone     Tablet 4 milliGRAM(s) Oral every 12 hours  fludroCORTISONE 0.2 milliGRAM(s) Oral daily  insulin lispro (ADMELOG) corrective regimen sliding scale   SubCutaneous Before meals and at bedtime    All other standing medications:   fluticasone propionate 50 MICROgram(s)/spray Nasal Spray 1 Spray(s) Both Nostrils two times a day  hydrocortisone 1% Cream 1 Application(s) Topical daily  senna 2 Tablet(s) Oral at bedtime  sucralfate 1 Gram(s) Oral every 12 hours  urea Oral Powder 15 Gram(s) Oral every 12 hours    All other PRN medications:  benzocaine/menthol Lozenge 1 Lozenge Oral every 4 hours PRN    Vital Signs Last 24 Hrs  T(C): 36.3 (2023 16:09), Max: 36.5 (2023 08:47)  T(F): 97.4 (2023 16:09), Max: 97.7 (2023 08:47)  HR: 78 (2023 16:09) (69 - 82)  BP: 105/66 (2023 16:09) (105/58 - 119/72)  BP(mean): 77 (2023 08:47) (77 - 77)  RR: 16 (2023 08:47) (16 - 18)  SpO2: 98% (2023 16:09) (95% - 98%)    Parameters below as of 2023 16:09  Patient On (Oxygen Delivery Method): room air      07-15 @ :  -   @ 07:00  --------------------------------------------------------  IN:  Total IN: 0 mL    OUT:    Incontinent per Condom Catheter (mL): 850 mL  Total OUT: 850 mL    Total NET: -850 mL      16 @ 07:  -   @ 20:11  --------------------------------------------------------  IN:  Total IN: 0 mL    OUT:    Voided (mL): 550 mL  Total OUT: 550 mL    Total NET: -550 mL      PHYSICAL EXAMINATION:  General: NAD, A+Ox3  Respiratory: No respiratory distress, stridor, or stertor  Voice quality: hoarse, high pitched voice  Face:  Symmetric without masses or lesions  OU: EOMI  AD: Pinna wnl  AS: Pinna wnl  Nose: nasal cavity clear bilaterally, inferior turbinates normal, mucosa normal without crusting or bleeding  OC/OP: tongue normal, floor of mouth WNL, no masses or lesions, OP clear  Neck: soft/flat, no LAD. Well healed tracheostomy stoma to anterior neck with mild crusting and granulation tissue.     Procedure: Flexible laryngoscopy    Pre-procedure diagnosis/Indication for procedure: To evaluate larynx    Description:    A flexible endoscope was used to examine the left and right nasal cavities, posterior oropharynx, hypopharynx, and larynx. The nasal valve areas were examined for abnormalities or collapse. The inferior and middle turbinates were evaluated. The middle and superior meatuses, the sphenoethmoid recesses, and the nasopharynx were examined and inspected for mucopurulence, polyps, and nasal masses. The oropharynx, tongue base/vallecula, epiglottis, aryepiglottic folds, arytenoids, vocal cords, hypopharynx were also inspected for swelling, inflammation, or dysfunction. The patient tolerated the procedure without complications.    Nasopharynx wnl, moderate crusting b/l nasal cavities  BOT/vallecula normal  Epiglottis sharp  AE folds nonedematous  Arytenoids mobile  Vocal folds mobile bilaterally. True vocal cords: mild mid true VC glottic gap, slight R > L limitation with abduction  No masses or lesions visualized in post cricoid space or pyriform sinuses bilaterally.   No active bleeding or significant obstruction noted in supraglottic area. Mod subglottic narrowing visualized, subglottis partially visualized       LABS                       11.9   6.98  )-----------( 78       ( 2023 05:30 )             35.8    07-16    133<L>  |  94<L>  |  29<H>  ----------------------------<  264<H>  3.8   |  30  |  0.68    Ca    9.0      2023 12:03  Phos  3.9     07-16  Mg     1.8     07-16           Coagulation Studies-     Urinalysis Basic - ( 2023 12:03 )    Color: x / Appearance: x / SG: x / pH: x  Gluc: 264 mg/dL / Ketone: x  / Bili: x / Urobili: x   Blood: x / Protein: x / Nitrite: x   Leuk Esterase: x / RBC: x / WBC x   Sq Epi: x / Non Sq Epi: x / Bacteria: x      Endocrine Panel-  Calcium: 9.0 mg/dL ( @ 12:03)  Calcium: 8.6 mg/dL ( @ 05:30)  Calcium: 8.9 mg/dL (07-15 @ 22:57)                MICROBIOLOGY:  Culture Results:   No growth at 5 days. (23 @ 23:47)  Culture Results:   No growth at 5 days. (23 @ 23:47)

## 2023-07-16 NOTE — PROGRESS NOTE ADULT - SUBJECTIVE AND OBJECTIVE BOX
Patient is a 68y Male seen and evaluated at bedside. No acute distress sNa at 132 this AM       Meds:    acetaminophen     Tablet .. 650 every 6 hours PRN  atorvastatin 20 at bedtime  dexAMETHasone     Tablet 4 every 12 hours  escitalopram 5 daily  fludroCORTISONE 0.2 daily  fluticasone propionate 50 MICROgram(s)/spray Nasal Spray 1 two times a day  hydrocortisone 1% Cream 1 daily  insulin lispro (ADMELOG) corrective regimen sliding scale  Before meals and at bedtime  levETIRAcetam 750 two times a day  magnesium sulfate  IVPB 2 once  melatonin 5 at bedtime  potassium chloride    Tablet ER 40 once  senna 2 at bedtime  sodium chloride 3 every 6 hours  sucralfate 1 every 12 hours  urea Oral Powder 15 every 12 hours      T(C): , Max: 36.4 (07-15-23 @ 16:00)  T(F): , Max: 97.6 (07-15-23 @ 16:00)  HR: 69 (07-16-23 @ 05:51)  BP: 105/58 (07-16-23 @ 05:51)  BP(mean): --  RR: 18 (07-16-23 @ 05:51)  SpO2: 96% (07-16-23 @ 05:51)  Wt(kg): --    07-15 @ 07:01  -  07-16 @ 07:00  --------------------------------------------------------  IN: 0 mL / OUT: 850 mL / NET: -850 mL          Review of Systems:  ROS negative except as per HPI      PHYSICAL EXAM:  GENERAL: NAD, sitting up in bed  HEENT: JUAN, EOMI, neck supple, no JVP  CHEST/LUNG: Bilateral clear breath sounds  HEART: Regular rate and rhythm, no murmur, no gallops, no rub   ABDOMEN: Soft, nontender, non distended  : No flank or supra pubic tenderness.  EXTREMITIES: no pedal edema  Neurology: AAOx3, no focal neurological deficit  SKIN: No rash or skin lesion       LABS:                        11.9   6.98  )-----------( 78       ( 16 Jul 2023 05:30 )             35.8     07-16    132<L>  |  94<L>  |  21  ----------------------------<  189<H>  3.5   |  27  |  0.58    Ca    8.6      16 Jul 2023 05:30  Phos  3.9     07-16  Mg     1.8     07-16          Urinalysis Basic - ( 16 Jul 2023 05:30 )    Color: x / Appearance: x / SG: x / pH: x  Gluc: 189 mg/dL / Ketone: x  / Bili: x / Urobili: x   Blood: x / Protein: x / Nitrite: x   Leuk Esterase: x / RBC: x / WBC x   Sq Epi: x / Non Sq Epi: x / Bacteria: x      Osmolality, Random Urine: 834 mosm/kg (07-14 @ 18:45)  Creatinine, Random Urine: 79 mg/dL (07-14 @ 18:45)  Sodium, Random Urine: 116 mmol/L (07-14 @ 18:45)        RADIOLOGY & ADDITIONAL STUDIES:

## 2023-07-16 NOTE — PROGRESS NOTE ADULT - SUBJECTIVE AND OBJECTIVE BOX
Vital Signs Last 24 Hrs  T(C): 36.4 (15 Jul 2023 21:34), Max: 36.6 (15 Jul 2023 08:34)  T(F): 97.5 (15 Jul 2023 21:34), Max: 97.9 (15 Jul 2023 08:34)  HR: 82 (15 Jul 2023 21:34) (62 - 82)  BP: 119/72 (15 Jul 2023 21:34) (104/62 - 119/72)  BP(mean): 81 (15 Jul 2023 08:34) (81 - 81)  RR: 18 (15 Jul 2023 21:34) (16 - 18)  SpO2: 95% (15 Jul 2023 21:34) (94% - 95%)    Parameters below as of 15 Jul 2023 21:34  Patient On (Oxygen Delivery Method): room air        I&O's Detail    14 Jul 2023 07:01  -  15 Jul 2023 07:00  --------------------------------------------------------  IN:    Oral Fluid: 474 mL  Total IN: 474 mL    OUT:    Incontinent per Condom Catheter (mL): 1450 mL  Total OUT: 1450 mL    Total NET: -976 mL      15 Jul 2023 07:01  -  16 Jul 2023 01:04  --------------------------------------------------------  IN:  Total IN: 0 mL    OUT:    Incontinent per Condom Catheter (mL): 850 mL  Total OUT: 850 mL    Total NET: -850 mL        I&O's Summary    14 Jul 2023 07:01  -  15 Jul 2023 07:00  --------------------------------------------------------  IN: 474 mL / OUT: 1450 mL / NET: -976 mL    15 Jul 2023 07:01  -  16 Jul 2023 01:04  --------------------------------------------------------  IN: 0 mL / OUT: 850 mL / NET: -850 mL        PHYSICAL EXAM:  Gen:  HEENT:  Neck:  Lungs:  Heart:  Abd:  Exts:  Neuro:    TUBES/LINES:  [] CVC  [] A-line  [] Lumbar Drain  [] Ventriculostomy  [] Other    DIET:  [] NPO  [] Mechanical  [] Tube feeds    LABS:                        12.1   6.65  )-----------( 119      ( 15 Jul 2023 08:12 )             35.5     07-15    135  |  96  |  25<H>  ----------------------------<  203<H>  4.1   |  31  |  0.83    Ca    8.9      15 Jul 2023 22:57  Phos  3.8     07-15  Mg     1.8     07-15        Urinalysis Basic - ( 15 Jul 2023 22:57 )    Color: x / Appearance: x / SG: x / pH: x  Gluc: 203 mg/dL / Ketone: x  / Bili: x / Urobili: x   Blood: x / Protein: x / Nitrite: x   Leuk Esterase: x / RBC: x / WBC x   Sq Epi: x / Non Sq Epi: x / Bacteria: x          CAPILLARY BLOOD GLUCOSE      POCT Blood Glucose.: 168 mg/dL (15 Jul 2023 22:20)  POCT Blood Glucose.: 158 mg/dL (15 Jul 2023 17:12)  POCT Blood Glucose.: 234 mg/dL (15 Jul 2023 11:43)  POCT Blood Glucose.: 161 mg/dL (15 Jul 2023 07:01)      Drug Levels: [] N/A    CSF Analysis: [] N/A      Allergies    amoxicillin (Rash)    Intolerances      MEDICATIONS:  Antibiotics:    Neuro:  acetaminophen     Tablet .. 650 milliGRAM(s) Oral every 6 hours PRN  escitalopram 5 milliGRAM(s) Oral daily  levETIRAcetam 750 milliGRAM(s) Oral two times a day  melatonin 5 milliGRAM(s) Oral at bedtime    Anticoagulation:    OTHER:  atorvastatin 20 milliGRAM(s) Oral at bedtime  chlorhexidine 2% Cloths 1 Application(s) Topical <User Schedule>  dexAMETHasone     Tablet 4 milliGRAM(s) Oral every 12 hours  fludroCORTISONE 0.2 milliGRAM(s) Oral daily  fluticasone propionate 50 MICROgram(s)/spray Nasal Spray 1 Spray(s) Both Nostrils two times a day  hydrocortisone 1% Cream 1 Application(s) Topical daily  insulin lispro (ADMELOG) corrective regimen sliding scale   SubCutaneous Before meals and at bedtime  senna 2 Tablet(s) Oral at bedtime  sucralfate 1 Gram(s) Oral every 12 hours  urea Oral Powder 15 Gram(s) Oral every 12 hours    IVF:  sodium chloride 3 Gram(s) Oral every 6 hours    CULTURES:  Culture Results:   No growth at 5 days. (07-07 @ 23:47)  Culture Results:   No growth at 5 days. (07-07 @ 23:47)    RADIOLOGY & ADDITIONAL TESTS:      ASSESSMENT:  68y Male s/p    ALTERED MENTAL STATUS;BRAIN MASS    Abnormal electrocardiogram [ECG] [EKG]    Allergy, unspecified, initial encounter    Anemia, unspecified    Benign prostatic hyperplasia without lower urinary tract symptoms    Calculus of kidney    Cough, unspecified    Depression, unspecified    ENCOUNTER FOR ATTENT    Encounter for general adult medical examination without abnormal findings    Encounter for immunization    Encounter for immunization safety counseling    Encounter for other preprocedural examination    Encounter for screening for malignant neoplasm of prostate    Encounter for screening for other metabolic disorders    Encounter for screening for other viral diseases    ESSENTIAL (PRIMARY)    Gastro-esophageal reflux disease without esophagitis    Generalized anxiety disorder    History of Glioblastoma    Hyperlipidemia, unspecified    Hypo-osmolality and hyponatremia    Hypotension, unspecified    Iron deficiency    Iron deficiency anemia, unspecified    LONG TERM (CURRENT)    LONG TERM (CURRENT)    LONG TERM (CURRENT)    Malignant neoplasm of brain, unspecified    Nausea with vomiting, unspecified    Other constipation    Other fatigue    Other injury of unspecified body region, initial encounter    OTHER SPECIFIED DISE    Personal history of malignant neoplasm, unspecified    Personal history of other diseases of the nervous system and sense organs    Personal history of other endocrine, nutritional and metabolic disease    Personal history of other mental and behavioral disorders    Personal history of transient ischemic attack (TIA), and cerebral infarction without residual deficits    POSTPROCEDURAL SUBGL    Pruritus, unspecified    Shortness of breath    Tachycardia, unspecified    Thrombocytopenia, unspecified    Unspecified abdominal pain    Unspecified visual disturbance    Urinary calculus, unspecified    Vitamin D deficiency, unspecified    No pertinent family history in first degree relatives    FH: diabetes mellitus (Father, Mother)    FH: hyperlipidemia (Father)    FH: hypertension (Father, Mother)    Handoff    MEWS Score    No pertinent past medical history    Diabetes mellitus    Hypertension    Glioblastoma    Type 2 diabetes mellitus    Hyperlipidemia    Iron deficiency anemia    Nephrolithiasis    Thrombocytopenia    Hyponatremia    BPH (benign prostatic hyperplasia)    No pertinent past medical history    Altered mental status    Hyponatremia    Hypertension    Glioblastoma    Type 2 diabetes mellitus    Hyperlipidemia    Iron deficiency anemia    Thrombocytopenia    BPH (benign prostatic hyperplasia)    Hyponatremia    No significant past surgical history    No significant past surgical history    S/P craniotomy    H/O tracheostomy    No significant past surgical history    MED EVAL    Type 2 diabetes mellitus    BPH (benign prostatic hyperplasia)    90+    Room Service Assist    Brain mass    Hypertension    Glioblastoma    Type 2 diabetes mellitus    Hyperlipidemia    Iron deficiency anemia    Thrombocytopenia    BPH (benign prostatic hyperplasia)    SysAdmin_VisitLink        PLAN:  NEURO:    CARDIOVASCULAR:    PULMONARY:    RENAL:    GI:    ID:    ENDO:    DVT PROPHYLAXIS:    DISPOSITION:       Assessment:  Present when checked    []  GCS  E   V  M     Heart Failure: []Acute, [] acute on chronic , []chronic  Heart Failure:  [] Diastolic (HFpEF), [] Systolic (HFrEF), []Combined (HFpEF and HFrEF), [] RHF, [] Pulm HTN, [] Other    [] EUGENIA, [] ATN, [] AIN, [] other  [] CKD1, [] CKD2, [] CKD 3, [] CKD 4, [] CKD 5, []ESRD    Encephalopathy: [] Metabolic, [] Hepatic, [] toxic, [] Neurological, [] Other    Abnormal Nurtitional Status: [] malnurtition (see nutrition note), [ ]underweight: BMI < 19, [] morbid obesity: BMI >40, [] Cachexia    [] Sepsis  [] hypovolemic shock,[] cardiogenic shock, [] hemorrhagic shock, [] neuogenic shock  [] Acute Respiratory Failure  []Cerebral edema, [] Brain compression/ herniation,   [] Functional quadriplegia  [] Acute blood loss anemia   HPI:  Osiel Norwood is a 67YO male with a past medical history of type 2 diabetes, hyperlipidemia, iron deficiency anemia, tracheal stenosis s/p tracheostomy (which was closed by ENT 7 days ago); glioblastoma s/p resection 1/27/2022, and Avastin treatment 3/2023 presenting with altered mental status and weakness x2 days. Per family, they last saw him last night around 6pm when he was at baseline. Patient has intermittent expressive aphasia but is getting worse and now is persistent, and has intermittent nausea and vomiting since last night. Patient was supposed to receive an outpatient brain MRI on 7/25/2023.  Stroke code performed in  is negative for CVA. Head CT shows new hyperdensity in frontal parietal - surgical site. MRI is ordered to rule out tumor recurrence.   Per daughter at bedside, Entresto and Eliquis were stopped per his PC - cardiologist.     Hospital Course:  7/7: Admitted. Na 115 in ED with repeat 118, started 3% at 30cc/hr, and salt tabs 3q6, stability CTH in ED showing Interval development of a 13 mm hyperdense nodule along the   inferior margin of the resection cavity which may represent progression   of disease with hemorrhage, repeat CTH stable, urine studies ordered  7/8: Neuro stable, 12AM BMP Na114 3% increased to 40cc/hr, urine studies, pancx to r/o infection since pt is immunocompromised. Changed pantoprazole to sucralfate for GI ppx d/t thrombocytopenia. LE dopplers negative. DC'd 3%. ENT consulted to assess stoma, recommend follow up upon discharge with ENT, Repeat sodium 122. Restarted 3% at 30.  7/9: ANA LILIA o/n neuro stable. remains on 3%@30cc/hr. Started florinef, increased 3% to 50cc/hr. Increased 3% to 75cc/hr.    7/10: continued current 3% rate o/n. Na 127 --> 125, cont 3% @75cc/hr, f/u repeat Na this evening, likely stepdown tomorrow. Pend dispo home with home PT/OT when able. Heme consulted for thrombocytopenia  7/11: ANA LILIA o/n. Remains on 3% @75cc/hr. Decreased 3% to 50cc/hr. Started 1.2L fluid restriction. Repeat Na corrected 128  7/12: ANA LILIA overnight, remains on 3%, SDU from ICU  7/13: ANA LILIA overnight, neuro stable, on 3%@50, Am sodium 137, decreased 3% to 25cc/hr, repeat Na 138, decreased to 15cc/hr.  7/14: ANA LILIA overnight, neuro stable, remains on 3%@15cc/hr  7/15: ANA LILIA overnight. Neuro exam stable. Pt remains on 3% saline.  7/16: ANA LILIA overnight, hypertonics d/c now on urea powder/1L fluid restriction/florinef and salt tabs per renal, hydrocortisone cream ordered for rash on back, rec for Home PT        Vital Signs Last 24 Hrs  T(C): 36.4 (15 Jul 2023 21:34), Max: 36.6 (15 Jul 2023 08:34)  T(F): 97.5 (15 Jul 2023 21:34), Max: 97.9 (15 Jul 2023 08:34)  HR: 82 (15 Jul 2023 21:34) (62 - 82)  BP: 119/72 (15 Jul 2023 21:34) (104/62 - 119/72)  BP(mean): 81 (15 Jul 2023 08:34) (81 - 81)  RR: 18 (15 Jul 2023 21:34) (16 - 18)  SpO2: 95% (15 Jul 2023 21:34) (94% - 95%)    Parameters below as of 15 Jul 2023 21:34  Patient On (Oxygen Delivery Method): room air        I&O's Detail    14 Jul 2023 07:01  -  15 Jul 2023 07:00  --------------------------------------------------------  IN:    Oral Fluid: 474 mL  Total IN: 474 mL    OUT:    Incontinent per Condom Catheter (mL): 1450 mL  Total OUT: 1450 mL    Total NET: -976 mL      15 Jul 2023 07:01  -  16 Jul 2023 01:04  --------------------------------------------------------  IN:  Total IN: 0 mL    OUT:    Incontinent per Condom Catheter (mL): 850 mL  Total OUT: 850 mL    Total NET: -850 mL        I&O's Summary    14 Jul 2023 07:01  -  15 Jul 2023 07:00  --------------------------------------------------------  IN: 474 mL / OUT: 1450 mL / NET: -976 mL    15 Jul 2023 07:01  -  16 Jul 2023 01:04  --------------------------------------------------------  IN: 0 mL / OUT: 850 mL / NET: -850 mL        PHYSICAL EXAM:  General: NAD, pt is comfortably sitting up in bed, on room air  HEENT: CN II-XII grossly intact, PERRL 3mm briskly reactive, EOMI b/l, face symmetric, tongue midline, neck FROM  Cardiovascular: RRR, normal S1 and S2   Respiratory: lungs CTAB, no wheezing, rhonchi, or crackles   GI: normoactive BS to auscultation, abd soft, NTND   Neuro: A&Ox2, No aphasia, speech clear, no dysmetria, no pronator drift. Follows commands.  DUMONT x4 spontaneously, 5/5 strength in all extremities throughout. SILT throughout   Extremities: warm and well perfused       TUBES/LINES:  [] CVC  [] A-line  [] Lumbar Drain  [] Ventriculostomy  [] Other    DIET:  [] NPO  [x] Mechanical  [] Tube feeds    LABS:                        12.1   6.65  )-----------( 119      ( 15 Jul 2023 08:12 )             35.5     07-15    135  |  96  |  25<H>  ----------------------------<  203<H>  4.1   |  31  |  0.83    Ca    8.9      15 Jul 2023 22:57  Phos  3.8     07-15  Mg     1.8     07-15        Urinalysis Basic - ( 15 Jul 2023 22:57 )    Color: x / Appearance: x / SG: x / pH: x  Gluc: 203 mg/dL / Ketone: x  / Bili: x / Urobili: x   Blood: x / Protein: x / Nitrite: x   Leuk Esterase: x / RBC: x / WBC x   Sq Epi: x / Non Sq Epi: x / Bacteria: x          CAPILLARY BLOOD GLUCOSE      POCT Blood Glucose.: 168 mg/dL (15 Jul 2023 22:20)  POCT Blood Glucose.: 158 mg/dL (15 Jul 2023 17:12)  POCT Blood Glucose.: 234 mg/dL (15 Jul 2023 11:43)  POCT Blood Glucose.: 161 mg/dL (15 Jul 2023 07:01)      Drug Levels: [] N/A    CSF Analysis: [] N/A      Allergies    amoxicillin (Rash)    Intolerances      MEDICATIONS:  Antibiotics:    Neuro:  acetaminophen     Tablet .. 650 milliGRAM(s) Oral every 6 hours PRN  escitalopram 5 milliGRAM(s) Oral daily  levETIRAcetam 750 milliGRAM(s) Oral two times a day  melatonin 5 milliGRAM(s) Oral at bedtime    Anticoagulation:    OTHER:  atorvastatin 20 milliGRAM(s) Oral at bedtime  chlorhexidine 2% Cloths 1 Application(s) Topical <User Schedule>  dexAMETHasone     Tablet 4 milliGRAM(s) Oral every 12 hours  fludroCORTISONE 0.2 milliGRAM(s) Oral daily  fluticasone propionate 50 MICROgram(s)/spray Nasal Spray 1 Spray(s) Both Nostrils two times a day  hydrocortisone 1% Cream 1 Application(s) Topical daily  insulin lispro (ADMELOG) corrective regimen sliding scale   SubCutaneous Before meals and at bedtime  senna 2 Tablet(s) Oral at bedtime  sucralfate 1 Gram(s) Oral every 12 hours  urea Oral Powder 15 Gram(s) Oral every 12 hours    IVF:  sodium chloride 3 Gram(s) Oral every 6 hours    CULTURES:  Culture Results:   No growth at 5 days. (07-07 @ 23:47)  Culture Results:   No growth at 5 days. (07-07 @ 23:47)    RADIOLOGY & ADDITIONAL TESTS:      ASSESSMENT:  67YO male with a past medical history of type 2 diabetes, hyperlipidemia, iron deficiency anemia, tracheal stenosis s/p tracheostomy (which was closed by ENT 7 days ago); glioblastoma s/p resection 1/27/2022, and Avastin treatment 3/2023 presenting with expressive aphasia, nuasea and vomiting x1 day. CT shows new hyperdensity in left frontal parietal - surgical site c/f disease progression. Na in the .     ALTERED MENTAL STATUS;BRAIN MASS    Abnormal electrocardiogram [ECG] [EKG]    Allergy, unspecified, initial encounter    Anemia, unspecified    Benign prostatic hyperplasia without lower urinary tract symptoms    Calculus of kidney    Cough, unspecified    Depression, unspecified    ENCOUNTER FOR ATTENT    Encounter for general adult medical examination without abnormal findings    Encounter for immunization    Encounter for immunization safety counseling    Encounter for other preprocedural examination    Encounter for screening for malignant neoplasm of prostate    Encounter for screening for other metabolic disorders    Encounter for screening for other viral diseases    ESSENTIAL (PRIMARY)    Gastro-esophageal reflux disease without esophagitis    Generalized anxiety disorder    History of Glioblastoma    Hyperlipidemia, unspecified    Hypo-osmolality and hyponatremia    Hypotension, unspecified    Iron deficiency    Iron deficiency anemia, unspecified    LONG TERM (CURRENT)    LONG TERM (CURRENT)    LONG TERM (CURRENT)    Malignant neoplasm of brain, unspecified    Nausea with vomiting, unspecified    Other constipation    Other fatigue    Other injury of unspecified body region, initial encounter    OTHER SPECIFIED DISE    Personal history of malignant neoplasm, unspecified    Personal history of other diseases of the nervous system and sense organs    Personal history of other endocrine, nutritional and metabolic disease    Personal history of other mental and behavioral disorders    Personal history of transient ischemic attack (TIA), and cerebral infarction without residual deficits    POSTPROCEDURAL SUBGL    Pruritus, unspecified    Shortness of breath    Tachycardia, unspecified    Thrombocytopenia, unspecified    Unspecified abdominal pain    Unspecified visual disturbance    Urinary calculus, unspecified    Vitamin D deficiency, unspecified    No pertinent family history in first degree relatives    FH: diabetes mellitus (Father, Mother)    FH: hyperlipidemia (Father)    FH: hypertension (Father, Mother)    Handoff    MEWS Score    No pertinent past medical history    Diabetes mellitus    Hypertension    Glioblastoma    Type 2 diabetes mellitus    Hyperlipidemia    Iron deficiency anemia    Nephrolithiasis    Thrombocytopenia    Hyponatremia    BPH (benign prostatic hyperplasia)    No pertinent past medical history    Altered mental status    Hyponatremia    Hypertension    Glioblastoma    Type 2 diabetes mellitus    Hyperlipidemia    Iron deficiency anemia    Thrombocytopenia    BPH (benign prostatic hyperplasia)    Hyponatremia    No significant past surgical history    No significant past surgical history    S/P craniotomy    H/O tracheostomy    No significant past surgical history    MED EVAL    Type 2 diabetes mellitus    BPH (benign prostatic hyperplasia)    90+    Room Service Assist    Brain mass    Hypertension    Glioblastoma    Type 2 diabetes mellitus    Hyperlipidemia    Iron deficiency anemia    Thrombocytopenia    BPH (benign prostatic hyperplasia)    SysAdmin_VisitLink        PLAN:  Neuro:  -neuro q4/vitals q4hrs   -CTH 7/7: Interval development of a 13 mm hyperdense nodule along the   inferior margin of the resection cavity which may represent progression   of disease with hemorrhage. Repeat CTH stable  -CTA 7/7: negative.  -pain control prn  -c/w home Keppra 750mg BID  -Decadron 4mg BID  -c/w home Lexapro  -home ASA 81 held i/s/o thrombocytopenia   -MRI suspicious for recurrent tumor and hydro     Pulm:  -satting well on RA    Cardio:  -SBP<160    GI:  -CCD with 1L fluid restriction  -bowel regimen  -sucralfate  -last BM 7/11    Renal:  -hyponatremic, Na 118 on admission  -1.0L fluid restriction started 7/11  -3% d/c 7/15  -florinef 0.2mg daily, salt tabs 3q6  -voiding  -urea powder 15g BID started per Renal    Endo:  -ISS  - A1c 5.7  -h/o T2DM: home Januvia held  -h/o HLD: c/w Atorvastatin 20mg    Heme:  -SCDs for DVT ppx, SQL held for thrombocytopenia   -heme consulted for thrombocytopenia- w/u sent, likely chronic thrombocytopenia d/t chemotherapy agent in past   -LE dopplers 7/8 negative    ID:  [id number]  -pan cx 7/7    Dispo: regional status, full code, Home PT    D/w Dr. Dorado         Assessment:  Present when checked    []  GCS  E   V  M     Heart Failure: []Acute, [] acute on chronic , []chronic  Heart Failure:  [] Diastolic (HFpEF), [] Systolic (HFrEF), []Combined (HFpEF and HFrEF), [] RHF, [] Pulm HTN, [] Other    [] EUGENIA, [] ATN, [] AIN, [] other  [] CKD1, [] CKD2, [] CKD 3, [] CKD 4, [] CKD 5, []ESRD    Encephalopathy: [] Metabolic, [] Hepatic, [] toxic, [] Neurological, [] Other    Abnormal Nurtitional Status: [] malnurtition (see nutrition note), [ ]underweight: BMI < 19, [] morbid obesity: BMI >40, [] Cachexia    [] Sepsis  [] hypovolemic shock,[] cardiogenic shock, [] hemorrhagic shock, [] neuogenic shock  [] Acute Respiratory Failure  []Cerebral edema, [] Brain compression/ herniation,   [] Functional quadriplegia  [] Acute blood loss anemia

## 2023-07-16 NOTE — DISCHARGE NOTE NURSING/CASE MANAGEMENT/SOCIAL WORK - NSDCFUADDAPPT_GEN_ALL_CORE_FT
Please follow up with Dr. Dorado    Please follow up with Dr. Joseph from Hematology Oncology     Please follow up with Dr. Caba from ENT outpatient     Please follow up with your primary care doctor

## 2023-07-16 NOTE — PROGRESS NOTE ADULT - ASSESSMENT
68Y M with no hx of CKD, Glioblastoma, chronic hyponatremia, on admission (7/7) found to have acute on chronic symptomatic hyponatremia with sNa of 115, patient appropriately managed by Neurology, sNa now fluctuating between 130-140, started on Urena 15g BID with sNa stable at 132 corrected for sugars at 134        Acute on Chronic asymptomatic hyponatremia   Recent Urine osm 834, likely SIADH given intracranial pathology along with pt on SSRI   Pt on Sodium Tabs 2g Q6HRS, Florinef 0.2mcg, intermittent hypertonic saline and fluid restriction   sNA at 132, corrected for sugars at 134    Plan:  Follow up BMP at 12PM   Continue with Salt tabs 2g Q6hrs   Can start on URENA 15g BID   Continue with Florinef   Continue with fluid restriction   Patient will need repeat BMP, Urine Na and Urine osm with PCP, early next week would prefer as early as Tuesday if discharged

## 2023-07-16 NOTE — PROGRESS NOTE ADULT - ASSESSMENT
ASSESSMENT/PLAN:  JORDAN CHAKRABORTY  is a 68y Male PMHx glioblastoma s/p resection 01/2022, complicated by prolonged intubation, development of tracheal stenosis, s/p endoscopic balloon dilation with CT surgery at [facility] 03/02/2022, followed by trach 03/2022. Patient follows with ENT outpatient (Dr. Caba) for trach care/stenosis. Admitted for AMS; approximately 2 weeks prior patient decannulated himself. Evaluated by ENT 6/27/23 and elected to remain decannulated with family decision. ENT consulted while inpatient for evaluation of tracheostomy stoma.  Imaging from 7/1 reviewed (ordered by outside ENT): redemonstrated proximal tracheal stenosis with narrowest tracheal lumen 9x8mm; improved from prior CT imaging in 2022. Now complaining of new onset hoarse voice, but no difficulty breathing or stridor. Flexible laryngoscopy stable compared to prior on 7/8, with mod subglottic narrowing and R > L slight true vocal cord limitation with abduction.    - Tracheostomy stoma appears closed and well healed  - F/u with Dr. Caba as scheduled on 7/25/23 for discussion of possible dilation with CT surgery in future  - Patient is breathing comfortably on room air without stridor  - Should patient become symptomatic would recommend intubation with small caliber ETT (i.e. 6.5 mm)  - No acute inpatient ENT intervention for stenosis ASSESSMENT/PLAN:  JORDAN CHAKRABORTY  is a 68y Male PMHx glioblastoma s/p resection 01/2022, complicated by prolonged intubation, development of tracheal stenosis, s/p endoscopic balloon dilation with CT surgery at [facility] 03/02/2022, followed by trach 03/2022. Patient follows with ENT outpatient (Dr. Caba) for trach care/stenosis. Admitted for AMS; approximately 1mo prior patient decannulated himself. Evaluated by ENT 6/27/23 and elected to remain decannulated with family decision. ENT consulted while inpatient for evaluation of tracheostomy stoma.  Imaging from 7/1 reviewed (ordered by outside ENT): redemonstrated proximal tracheal stenosis with narrowest tracheal lumen 9x8mm; improved from prior CT imaging in 2022. Now complaining of new onset hoarse voice, but no difficulty breathing or stridor. Flexible laryngoscopy stable compared to prior on 7/8, with mod subglottic narrowing and R > L slight true vocal cord limitation with abduction, mild mid true VC glottic gap.    - Tracheostomy stoma appears closed and well healed  - F/u with Dr. Caba as scheduled on 7/25/23 for discussion of possible dilation with CT surgery in future  - Patient is breathing comfortably on room air without stridor  - Should patient become symptomatic would recommend intubation with small caliber ETT (i.e. 6.5 mm)  - No acute inpatient ENT intervention for stenosis

## 2023-07-16 NOTE — DISCHARGE NOTE NURSING/CASE MANAGEMENT/SOCIAL WORK - PATIENT PORTAL LINK FT
You can access the FollowMyHealth Patient Portal offered by Health system by registering at the following website: [email or web address]. By joining [employer]’s FollowMyHealth portal, you will also be able to view your health information using other applications (apps) compatible with our system.

## 2023-07-16 NOTE — PROGRESS NOTE ADULT - ASSESSMENT
67YO male with a past medical history of type 2 diabetes, hyperlipidemia, iron deficiency anemia, tracheal stenosis s/p tracheostomy (which was closed by ENT 7 days ago); glioblastoma s/p resection 1/27/2022, and Avastin treatment 3/2023 presenting with expressive aphasia, nuasea and vomiting x1 day. CT shows new hyperdensity in left frontal parietal - surgical site c/f disease progression.      # Hyperdensity in left frontal parietal which may represent progression of disease.   -Management per primary team   -Continue Keppra 750mg BID  -Continue Decadron 4mg q12  -Pt's  ASA 81 held i/s/o thrombocytopenia     #Hyponatremia   -Renal consult appreciated and per renal rec pt was started on URENA 15g BID   -florinef 0.2mg daily, salt tabs 3q6  -Will repeat another BMP this afternoon   -Per Renal team the patient will need repeat BMP, Urine Na and Urine osm with PCP, early next week  if discharged     #Type II Diabetes  - A1c 5.7  -Pt is on Januvia at home   -Continue ISS     #HLD  - Continue Atorvastatin 20mg daily     #Thrombocytopenia   -Pt's platelets are improved this morning to 119.   -Heme consulted for thrombocytopenia; Per Heme documentation the pt w/  history of thrombocytopenia with his baseline range  since the latter half of last year and that the pt's Pt's thrombocytopenia likely due to past treatment with temozolomide.   Per heme transfuse 1U plt if bleeding and platelet count <50k, if febrile and platelet count <20k,   Dopplers 7/8/23 negative for DVT    #Dispo:   -Pt is for Home PT' Dispo as per primary team

## 2023-07-17 NOTE — PROGRESS NOTE ADULT - ASSESSMENT
67YO male with a past medical history of type 2 diabetes, hyperlipidemia, iron deficiency anemia, tracheal stenosis s/p tracheostomy (which was closed by ENT 7 days ago); glioblastoma s/p resection 1/27/2022, and Avastin treatment 3/2023 presenting with expressive aphasia, nuasea and vomiting x1 day. CT shows new hyperdensity in left frontal parietal - surgical site c/f disease progression.      # Hyperdensity in left frontal parietal which may represent progression of disease.   -Management per primary team   -Continue Keppra 750mg BID  -Continue Decadron 4mg q12  -Pt's  ASA 81 held i/s/o thrombocytopenia     #Hyponatremia   -Renal consult appreciated and per renal rec pt was started on URENA 15g BID   -florinef 0.2mg daily, salt tabs 2gm q6  -Will repeat another BMP this afternoon   -Per Renal team the patient will need repeat BMP, Urine Na and Urine osm with PCP, early this week    #Type II Diabetes  - A1c 5.7  -Pt is on Januvia at home   -Continue ISS     #HLD  - Continue Atorvastatin 20mg daily     #Thrombocytopenia   -Pt's platelets are stable 70-100k  -Heme consulted for thrombocytopenia; Per Heme documentation the pt w/  history of thrombocytopenia with his baseline range  since the latter half of last year and that the pt's Pt's thrombocytopenia likely due to past treatment with temozolomide.   Per heme transfuse 1U plt if bleeding and platelet count <50k, if febrile and platelet count <20k,   Dopplers 7/8/23 negative for DVT    #Dispo:   -Pt is for Home PT

## 2023-07-17 NOTE — PROGRESS NOTE ADULT - SUBJECTIVE AND OBJECTIVE BOX
Patient is a 68y old  Male who presents with a chief complaint of Altered Mental Status & Weakness x2 days (17 Jul 2023 00:34)      SUBJECTIVE:  Patient seen and examined at bedside.  No complaints.  Eager for home    ROS:  Denies fevers, chills, headache, vision changes, neck pain, cough, SOB, chest pain, Abdominal pain, N/V.  All other ROS negative except as above    Vital Signs Last 12 Hrs  T(F): 98.1 (07-17-23 @ 09:00), Max: 98.1 (07-17-23 @ 09:00)  HR: 84 (07-17-23 @ 09:00) (78 - 84)  BP: 135/82 (07-17-23 @ 09:00) (118/70 - 135/82)  BP(mean): 100 (07-17-23 @ 09:00) (100 - 100)  RR: 17 (07-17-23 @ 09:00) (17 - 18)  SpO2: 95% (07-17-23 @ 09:00) (95% - 97%)  I&O's Summary    16 Jul 2023 07:01  -  17 Jul 2023 07:00  --------------------------------------------------------  IN: 350 mL / OUT: 1250 mL / NET: -900 mL    17 Jul 2023 07:01  -  17 Jul 2023 11:53  --------------------------------------------------------  IN: 0 mL / OUT: 200 mL / NET: -200 mL        PHYSICAL EXAM:  Constitutional: NAD, comfortable in bed.  HEENT: PERRLA, EOMI, MMM, hoarse voice  Neck: Supple, no JVD  Respiratory: CTA B/L. No w/r/r.   Cardiovascular: RRR, normal S1 and S2, no m/r/g.   Gastrointestinal: +BS, soft NTND  Extremities: wwp; no cyanosis, clubbing or edema.   Vascular: Pulses equal and strong throughout.   Neurological: AAOx3, no focal deficits  Skin: No gross skin abnormalities or rashes        LABS:                        11.4   8.52  )-----------( 76       ( 17 Jul 2023 05:04 )             35.1     07-17    135  |  99  |  21  ----------------------------<  179<H>  4.0   |  29  |  0.57    Ca    8.7      17 Jul 2023 05:04  Phos  3.5     07-17  Mg     1.9     07-17        Urinalysis Basic - ( 17 Jul 2023 05:04 )    Color: x / Appearance: x / SG: x / pH: x  Gluc: 179 mg/dL / Ketone: x  / Bili: x / Urobili: x   Blood: x / Protein: x / Nitrite: x   Leuk Esterase: x / RBC: x / WBC x   Sq Epi: x / Non Sq Epi: x / Bacteria: x          RADIOLOGY & ADDITIONAL TESTS:  No new imaging    MEDICATIONS  (STANDING):  atorvastatin 20 milliGRAM(s) Oral at bedtime  dexAMETHasone     Tablet 4 milliGRAM(s) Oral every 12 hours  escitalopram 5 milliGRAM(s) Oral daily  fludroCORTISONE 0.2 milliGRAM(s) Oral daily  fluticasone propionate 50 MICROgram(s)/spray Nasal Spray 1 Spray(s) Both Nostrils two times a day  hydrocortisone 1% Cream 1 Application(s) Topical daily  insulin lispro (ADMELOG) corrective regimen sliding scale   SubCutaneous Before meals and at bedtime  levETIRAcetam 750 milliGRAM(s) Oral two times a day  melatonin 5 milliGRAM(s) Oral at bedtime  senna 2 Tablet(s) Oral at bedtime  sodium chloride 3 Gram(s) Oral every 6 hours  sucralfate 1 Gram(s) Oral every 12 hours  urea Oral Powder 15 Gram(s) Oral every 12 hours    MEDICATIONS  (PRN):  acetaminophen     Tablet .. 650 milliGRAM(s) Oral every 6 hours PRN Temp greater or equal to 38C (100.4F), Mild Pain (1 - 3)  benzocaine/menthol Lozenge 1 Lozenge Oral every 4 hours PRN Sore Throat

## 2023-07-17 NOTE — CHART NOTE - NSCHARTNOTEFT_GEN_A_CORE
Called by primary team regarding change in status.  Patient started having severe 10/10 headache, worsening of mental status (from baseline of A&Ox2-3 with mild memory deficits to only answering Yes/No questions).  Stroked code called but patient developed HTN emergency and vomiting so rapid response was called.    Initial VS T 97.7, , /91 and RR 16 with SpO2 98%.  BP increased to 190s/110s    PHYSICAL EXAM:  Constitutional: uncomfortable  HEENT: NC/AT, PERRLA, EOMI, MMM  Neck: Supple, no JVD  Respiratory: CTA B/L. tachypnea, no upper airway wheezing, comfortable on 2L   Cardiovascular: RRR, normal S1 and S2, no m/r/g.   Gastrointestinal: +BS, soft NTND, no guarding or rebound tenderness   Extremities: wwp; no cyanosis, clubbing or edema.   Neurological: Awake, following commands, answers Y/N questions, strength exam intact      Patient was given IV dilaudid for headache, IV hydralazine 10mg for HTN Emergency.  CT scans for stroke code stable    STAT labs significant for increased WBC to 16 from 8, stable thrombocytopenia and anemia.  CMP with sodium 131, acidosis with elevated anion gap and lactate of 8.5.    After return from CT scan patient developed SVT with HRs in the 170s-180s, [id number]  Given 10 IV Labetalol with return to NSR on zoll in 120s  Given 1L NS    Mental status returned to baseline    #SVT  - failed vagal maneuvers  - repeat IV labetalol as needed vs Adenosine, confirm SVT on EKG prior to adenosine  - consider cardiology if frequent recurrence    #HTN emergency  - likely in the setting of headache  - pain control, s/p labetalol as above with improvement    #Metabolic acidosis with elevated anion gap  - likely due to lactic acidosis, etiology of lactic acidosis most likely WOB.  No infectious symptoms on exam, only complaint is headache.  Abd exam benign, afebrile  - 1L NS  - repeat Lactate, BMP and beta hydroxybutyrate after IVF  - per son in law at bedside had prior episode of acidosis when admitted for chemo, has never had DKA    #Hyponatremia  - repeat Na 131  - nephrology following  - repeat BMP after fluids    Patient for upgrade to telemetry due to SVT

## 2023-07-17 NOTE — PROGRESS NOTE ADULT - SUBJECTIVE AND OBJECTIVE BOX
HPI:  Osiel Norwood is a 67YO male with a past medical history of type 2 diabetes, hyperlipidemia, iron deficiency anemia, tracheal stenosis s/p tracheostomy (which was closed by ENT 7 days ago); glioblastoma s/p resection 1/27/2022, and Avastin treatment 3/2023 presenting with altered mental status and weakness x2 days. Per family, they last saw him last night around 6pm when he was at baseline. Patient has intermittent expressive aphasia but is getting worse and now is persistent, and has intermittent nausea and vomiting since last night. Patient was supposed to receive an outpatient brain MRI on 7/25/2023.  Stroke code performed in  is negative for CVA. Head CT shows new hyperdensity in frontal parietal - surgical site. MRI is ordered to rule out tumor recurrence.   Per daughter at bedside, Entresto and Eliquis were stopped per his PC - cardiologist.     Hospital Course:  7/7: Admitted. Na 115 in ED with repeat 118, started 3% at 30cc/hr, and salt tabs 3q6, stability CTH in ED showing Interval development of a 13 mm hyperdense nodule along the   inferior margin of the resection cavity which may represent progression   of disease with hemorrhage, repeat CTH stable, urine studies ordered  7/8: Neuro stable, 12AM BMP Na114 3% increased to 40cc/hr, urine studies, pancx to r/o infection since pt is immunocompromised. Changed pantoprazole to sucralfate for GI ppx d/t thrombocytopenia. LE dopplers negative. DC'd 3%. ENT consulted to assess stoma, recommend follow up upon discharge with ENT, Repeat sodium 122. Restarted 3% at 30.  7/9: ANA ILLIA o/n neuro stable. remains on 3%@30cc/hr. Started florinef, increased 3% to 50cc/hr. Increased 3% to 75cc/hr.    7/10: continued current 3% rate o/n. Na 127 --> 125, cont 3% @75cc/hr, f/u repeat Na this evening, likely stepdown tomorrow. Pend dispo home with home PT/OT when able. Heme consulted for thrombocytopenia  7/11: ANA LILIA o/n. Remains on 3% @75cc/hr. Decreased 3% to 50cc/hr. Started 1.2L fluid restriction. Repeat Na corrected 128  7/12: ANA LILIA overnight, remains on 3%, SDU from ICU  7/13: ANA LILIA overnight, neuro stable, on 3%@50, Am sodium 137, decreased 3% to 25cc/hr, repeat Na 138, decreased to 15cc/hr.  7/14: ANA LILIA overnight, neuro stable, remains on 3%@15cc/hr  7/15: ANA LILIA overnight. Neuro exam stable. Pt remains on 3% saline.  7/16: ANA LILIA overnight, hypertonics d/c now on urea powder/1L fluid restriction/florinef and salt tabs per renal, hydrocortisone cream ordered for rash on back, rec for Home PT  7/17: ANA LILIA overnight, following Na, renal following, ENT saw for hoarse voice rec followup with CT surgery for possible dilation, pending Home PT    Vital Signs Last 24 Hrs  T(C): 36.4 (16 Jul 2023 22:46), Max: 36.5 (16 Jul 2023 08:47)  T(F): 97.6 (16 Jul 2023 22:46), Max: 97.7 (16 Jul 2023 08:47)  HR: 77 (16 Jul 2023 22:46) (69 - 78)  BP: 115/71 (16 Jul 2023 22:46) (105/58 - 115/71)  BP(mean): 77 (16 Jul 2023 08:47) (77 - 77)  RR: 18 (16 Jul 2023 22:46) (16 - 18)  SpO2: 95% (16 Jul 2023 22:46) (95% - 98%)    Parameters below as of 16 Jul 2023 22:46  Patient On (Oxygen Delivery Method): room air        I&O's Detail    15 Jul 2023 07:01  -  16 Jul 2023 07:00  --------------------------------------------------------  IN:  Total IN: 0 mL    OUT:    Incontinent per Condom Catheter (mL): 850 mL  Total OUT: 850 mL    Total NET: -850 mL      16 Jul 2023 07:01  -  17 Jul 2023 00:34  --------------------------------------------------------  IN:  Total IN: 0 mL    OUT:    Voided (mL): 550 mL  Total OUT: 550 mL    Total NET: -550 mL        I&O's Summary    15 Jul 2023 07:01  -  16 Jul 2023 07:00  --------------------------------------------------------  IN: 0 mL / OUT: 850 mL / NET: -850 mL    16 Jul 2023 07:01  -  17 Jul 2023 00:34  --------------------------------------------------------  IN: 0 mL / OUT: 550 mL / NET: -550 mL        PHYSICAL EXAM:  General: NAD, pt is comfortably sitting up in bed, on room air  HEENT: CN II-XII grossly intact, PERRL 3mm briskly reactive, EOMI b/l, face symmetric, tongue midline, neck FROM  Cardiovascular: RRR, normal S1 and S2   Respiratory: lungs CTAB, no wheezing, rhonchi, or crackles   GI: normoactive BS to auscultation, abd soft, NTND   Neuro: A&Ox2, No aphasia, speech clear, no dysmetria, no pronator drift. Follows commands.  DUMONT x4 spontaneously, 5/5 strength in all extremities throughout. SILT throughout   Extremities: warm and well perfused     TUBES/LINES:  [] CVC  [] A-line  [] Lumbar Drain  [] Ventriculostomy  [] Other    DIET:  [] NPO  [x] Mechanical  [] Tube feeds    LABS:                        11.9   6.98  )-----------( 78       ( 16 Jul 2023 05:30 )             35.8     07-16    133<L>  |  94<L>  |  29<H>  ----------------------------<  264<H>  3.8   |  30  |  0.68    Ca    9.0      16 Jul 2023 12:03  Phos  3.9     07-16  Mg     1.8     07-16        Urinalysis Basic - ( 16 Jul 2023 12:03 )    Color: x / Appearance: x / SG: x / pH: x  Gluc: 264 mg/dL / Ketone: x  / Bili: x / Urobili: x   Blood: x / Protein: x / Nitrite: x   Leuk Esterase: x / RBC: x / WBC x   Sq Epi: x / Non Sq Epi: x / Bacteria: x          CAPILLARY BLOOD GLUCOSE      POCT Blood Glucose.: 171 mg/dL (16 Jul 2023 21:47)  POCT Blood Glucose.: 259 mg/dL (16 Jul 2023 16:44)  POCT Blood Glucose.: 256 mg/dL (16 Jul 2023 12:48)  POCT Blood Glucose.: 167 mg/dL (16 Jul 2023 06:58)      Drug Levels: [] N/A    CSF Analysis: [] N/A      Allergies    amoxicillin (Rash)    Intolerances      MEDICATIONS:  Antibiotics:    Neuro:  acetaminophen     Tablet .. 650 milliGRAM(s) Oral every 6 hours PRN  escitalopram 5 milliGRAM(s) Oral daily  levETIRAcetam 750 milliGRAM(s) Oral two times a day  melatonin 5 milliGRAM(s) Oral at bedtime    Anticoagulation:    OTHER:  atorvastatin 20 milliGRAM(s) Oral at bedtime  benzocaine/menthol Lozenge 1 Lozenge Oral every 4 hours PRN  dexAMETHasone     Tablet 4 milliGRAM(s) Oral every 12 hours  fludroCORTISONE 0.2 milliGRAM(s) Oral daily  fluticasone propionate 50 MICROgram(s)/spray Nasal Spray 1 Spray(s) Both Nostrils two times a day  hydrocortisone 1% Cream 1 Application(s) Topical daily  insulin lispro (ADMELOG) corrective regimen sliding scale   SubCutaneous Before meals and at bedtime  senna 2 Tablet(s) Oral at bedtime  sucralfate 1 Gram(s) Oral every 12 hours  urea Oral Powder 15 Gram(s) Oral every 12 hours    IVF:  sodium chloride 3 Gram(s) Oral every 6 hours    CULTURES:  Culture Results:   No growth at 5 days. (07-07 @ 23:47)  Culture Results:   No growth at 5 days. (07-07 @ 23:47)    RADIOLOGY & ADDITIONAL TESTS:      ASSESSMENT:  67YO male with a past medical history of type 2 diabetes, hyperlipidemia, iron deficiency anemia, tracheal stenosis s/p tracheostomy (which was closed by ENT 7 days ago); glioblastoma s/p resection 1/27/2022, and Avastin treatment 3/2023 presenting with expressive aphasia, nuasea and vomiting x1 day. CT shows new hyperdensity in left frontal parietal - surgical site c/f disease progression. Na in the .     ALTERED MENTAL STATUS;BRAIN MASS    Abnormal electrocardiogram [ECG] [EKG]    Allergy, unspecified, initial encounter    Anemia, unspecified    Benign prostatic hyperplasia without lower urinary tract symptoms    Calculus of kidney    Cough, unspecified    Depression, unspecified    ENCOUNTER FOR ATTENT    Encounter for general adult medical examination without abnormal findings    Encounter for immunization    Encounter for immunization safety counseling    Encounter for other preprocedural examination    Encounter for screening for malignant neoplasm of prostate    Encounter for screening for other metabolic disorders    Encounter for screening for other viral diseases    ESSENTIAL (PRIMARY)    Gastro-esophageal reflux disease without esophagitis    Generalized anxiety disorder    History of Glioblastoma    Hyperlipidemia, unspecified    Hypo-osmolality and hyponatremia    Hypotension, unspecified    Iron deficiency    Iron deficiency anemia, unspecified    LONG TERM (CURRENT)    LONG TERM (CURRENT)    LONG TERM (CURRENT)    Malignant neoplasm of brain, unspecified    Nausea with vomiting, unspecified    Other constipation    Other fatigue    Other injury of unspecified body region, initial encounter    OTHER SPECIFIED DISE    Personal history of malignant neoplasm, unspecified    Personal history of other diseases of the nervous system and sense organs    Personal history of other endocrine, nutritional and metabolic disease    Personal history of other mental and behavioral disorders    Personal history of transient ischemic attack (TIA), and cerebral infarction without residual deficits    POSTPROCEDURAL SUBGL    Pruritus, unspecified    Shortness of breath    Tachycardia, unspecified    Thrombocytopenia, unspecified    Unspecified abdominal pain    Unspecified visual disturbance    Urinary calculus, unspecified    Vitamin D deficiency, unspecified    No pertinent family history in first degree relatives    FH: diabetes mellitus (Father, Mother)    FH: hyperlipidemia (Father)    FH: hypertension (Father, Mother)    Handoff    MEWS Score    No pertinent past medical history    Diabetes mellitus    Hypertension    Glioblastoma    Type 2 diabetes mellitus    Hyperlipidemia    Iron deficiency anemia    Nephrolithiasis    Thrombocytopenia    Hyponatremia    BPH (benign prostatic hyperplasia)    No pertinent past medical history    Altered mental status    Hyponatremia    Hypertension    Glioblastoma    Type 2 diabetes mellitus    Hyperlipidemia    Iron deficiency anemia    Thrombocytopenia    BPH (benign prostatic hyperplasia)    Hyponatremia    No significant past surgical history    No significant past surgical history    S/P craniotomy    H/O tracheostomy    No significant past surgical history    MED EVAL    Type 2 diabetes mellitus    BPH (benign prostatic hyperplasia)    90+    Room Service Assist    Brain mass    Hypertension    Glioblastoma    Type 2 diabetes mellitus    Hyperlipidemia    Iron deficiency anemia    Thrombocytopenia    BPH (benign prostatic hyperplasia)    [email or web address]        PLAN:  Neuro:  -neuro q4/vitals q4hrs   -CTH 7/7: Interval development of a 13 mm hyperdense nodule along the   inferior margin of the resection cavity which may represent progression   of disease with hemorrhage. Repeat CTH stable  -CTA 7/7: negative.  -pain control prn  -c/w home Keppra 750mg BID  -Decadron 4mg BID  -c/w home Lexapro  -home ASA 81 held i/s/o thrombocytopenia   -MRI suspicious for recurrent tumor and hydro     Pulm:  -satting well on RA    Cardio:  -SBP<160    GI:  -CCD with 1L fluid restriction  -bowel regimen  -sucralfate  -last BM 7/11    Renal:  -hyponatremic, Na 118 on admission  -1.0L fluid restriction started 7/11  -3% d/c 7/15  -florinef 0.2mg daily, salt tabs 3q6  -voiding  -urea powder 15g BID started per Renal    Endo:  -ISS  - A1c 5.7  -h/o T2DM: home Januvia held  -h/o HLD: c/w Atorvastatin 20mg    Heme:  -SCDs for DVT ppx, SQL held for thrombocytopenia   -heme consulted for thrombocytopenia- w/u sent, likely chronic thrombocytopenia d/t chemotherapy agent in past   -LE dopplers 7/8 negative    ID:  -afebrile  -pan cx 7/7    Dispo: regional status, full code, Home PT    D/w Dr. Dorado         Assessment:  Present when checked    []  GCS  E   V  M     Heart Failure: []Acute, [] acute on chronic , []chronic  Heart Failure:  [] Diastolic (HFpEF), [] Systolic (HFrEF), []Combined (HFpEF and HFrEF), [] RHF, [] Pulm HTN, [] Other    [] EUGENIA, [] ATN, [] AIN, [] other  [] CKD1, [] CKD2, [] CKD 3, [] CKD 4, [] CKD 5, []ESRD    Encephalopathy: [] Metabolic, [] Hepatic, [] toxic, [] Neurological, [] Other    Abnormal Nurtitional Status: [] malnurtition (see nutrition note), [ ]underweight: BMI < 19, [] morbid obesity: BMI >40, [] Cachexia    [] Sepsis  [] hypovolemic shock,[] cardiogenic shock, [] hemorrhagic shock, [] neuogenic shock  [] Acute Respiratory Failure  []Cerebral edema, [] Brain compression/ herniation,   [] Functional quadriplegia  [] Acute blood loss anemia

## 2023-07-17 NOTE — CHART NOTE - NSCHARTNOTEFT_GEN_A_CORE
Patient developed sudden onset severe headache. Hyperventilating with increased work of breathing. Wheezing in all lung fields to auscultation. Neuro intact on exam. Duoneb x1 ordered. Dilaudid 0.25 IV given for pain control. Subsequently patient developed nausea with multiple episodes of vomiting. Hypertensive with SBP in the 190s. Given hydralazine 10mg IVP, Zofran. Stroke code and rapid response called. STAT labs sent. CTH/CTA/CTP completed. Tachycardic to the 180s, consistent with SVT, remained hypertensive. Given 10 of labetalol, SVT broke. Transferred to Telemetry. WBC 23.43, lactate 8.5. Given 1L fluid bolus. Pan culture sent. Started empirically on Vanc/Meropenem.

## 2023-07-18 NOTE — CONSULT NOTE ADULT - PROBLEM SELECTOR RECOMMENDATION 4
.  - Full code  - daughter Roxanne Norwood [phone]    In addition to the E/M visit, an advance care planning meeting was performed. Start time:330 ; End time: 400; Total time: 30 min. A face to face meeting to discuss advance care planning was held today regarding:   Osiel Norwood  Primary decision maker:  Patient is unable to participate in decision making;  Alternate/surrogate: Roxanne Norwood    . Discussed advance directives including, but not limited to, healthcare proxy and code status, as well as disease trajectory, patient's values/goals, and health care options that are available for end of life care. Decision regarding code status: FULL CODE; Documentation completed today:  [facility] note

## 2023-07-18 NOTE — EEG REPORT - NS EEG TEXT BOX
[facility] Department of Neurology  Inpatient Continuous video-Electroencephalogram    Patient Name:	JORDAN CHAKRABORTY    :	1955  MRN:	[id number]    Study Start Date/Time:  2023, 9:40:13 PM  Study End Date/Time:      Brief Clinical History:  JORDAN CHAKRABORTY is a 68 year old Male; study performed to investigate for seizures or markers of epilepsy.    Diagnosis Code:   R56.9 convulsions/seizure  The live video was: unmonitored.    Pertinent Medications:  n/a    Acquisition Details:  Electroencephalography was acquired using a minimum of 21 channels on an [employer] system v 9.3.1 with electrode placement according to the standard International 10-20 system following ACNS (American Clinical Neurophysiology Society) guidelines for Long-Term Video EEG monitoring.  Anterior temporal T1 and T2 electrodes were utilized whenever possible.   The [employer] automated spike & seizure detections were all reviewed in detail, in addition to extensive portions of raw EEG.      Day 1: 2023 @ 9:40:13 PM to next morning @ 07:00 am  Background:  continuous, with predominantly alpha/theta frequencies.  Symmetry:  near continuous irregular delta>that’s over left hemisphere, max in frontotemporal region   Posterior Dominant Rhythm:  8 Hz fragmented.  Organization: Rudimentary.  Voltage:  high  Variability: Yes. 		Reactivity: Yes.  N2 sleep: Symmetric, synchronous spindles and K complexes.  Spontaneous Activity:  Occasional to frequent sharply contoured waves over left frontotemporal region, variable max  Periodic/rhythmic activity:  None  Events:  No electrographic seizures or significant clinical events.  Provocations:  Hyperventilation and Photic stimulation: was not performed.    Daily Summary:    1) Mild to moderate generalized   slowing suggestive of a mild to moderate degree of diffuse or multifocal  cerebral dysfunction.  2) Focal slowing over left hemisphere, more prominent in frontotemporal region suggestive of focal cerebral dysfunction  3) Occasional to frequent sharply contoured waves over left frontotemporal region, variable max, indicating underlying hyperexcitability.     Marilyn Guzman MD  Attending Neurologist, [facility]

## 2023-07-18 NOTE — PROGRESS NOTE ADULT - SUBJECTIVE AND OBJECTIVE BOX
Patient is a 68y old  Male who presents with a chief complaint of Altered Mental Status & Weakness x2 days (18 Jul 2023 03:25)      SUBJECTIVE:  Patient seen and examined at bedside.   Awake, answers Y/N questions, following commands.  Reports mild headache, but says better than yesterday.  Denies CP, cough, abdominal pain, pain with urination    ROS: All other ROS negative except as above    Vital Signs Last 12 Hrs  T(F): 97.4 (07-18-23 @ 09:00), Max: 97.9 (07-18-23 @ 05:00)  HR: 108 (07-18-23 @ 08:27) (108 - 120)  BP: 149/95 (07-18-23 @ 08:27) (136/74 - 149/95)  BP(mean): 112 (07-18-23 @ 08:27) (99 - 122)  RR: 18 (07-18-23 @ 08:27) (18 - 18)  SpO2: 96% (07-18-23 @ 08:27) (96% - 98%)  I&O's Summary    17 Jul 2023 07:01  -  18 Jul 2023 07:00  --------------------------------------------------------  IN: 2650 mL / OUT: 1925 mL / NET: 725 mL        PHYSICAL EXAM:  Constitutional: NAD, slightly restless  HEENT: PERRLA, EOMI, dry MM  Neck: Supple  Respiratory: CTA B/L. No w/r/r.   Cardiovascular: tachycardic, regular, normal S1 and S2, no m/r/g.   Gastrointestinal: +BS, soft NTND  Extremities: wwp; no cyanosis, clubbing or edema.   Vascular: Pulses equal and strong throughout.   Neurological: Awake, alert to self, follows commands, slightly more lethargic  Skin: macular rash on chest/back and legs resolving        LABS:                        12.2   28.59 )-----------( 84       ( 18 Jul 2023 05:30 )             35.6     07-18    133<L>  |  92<L>  |  13  ----------------------------<  197<H>  3.7   |  27  |  0.52    Ca    8.8      18 Jul 2023 05:30  Phos  3.7     07-18  Mg     2.0     07-18    TPro  6.5  /  Alb  3.8  /  TBili  1.4<H>  /  DBili  0.3  /  AST  25  /  ALT  75<H>  /  AlkPhos  64  07-17    PT/INR - ( 17 Jul 2023 14:34 )   PT: 12.8 sec;   INR: 1.07          PTT - ( 17 Jul 2023 14:34 )  PTT:21.0 sec  Urinalysis Basic - ( 18 Jul 2023 05:30 )    Color: x / Appearance: x / SG: x / pH: x  Gluc: 197 mg/dL / Ketone: x  / Bili: x / Urobili: x   Blood: x / Protein: x / Nitrite: x   Leuk Esterase: x / RBC: x / WBC x   Sq Epi: x / Non Sq Epi: x / Bacteria: x          RADIOLOGY & ADDITIONAL TESTS:  < from: CT Brain Stroke Protocol (07.17.23 @ 15:13) >  IMPRESSION:    CT HEAD: Status post left frontal craniotomy for tumor resection. Stable   hyperdense nodule at the inferior medial margin of theresection cavity   in the left inferior frontal lobe. Extensive surrounding hypodensity in   the left frontal lobe, left basal ganglia, and right inferior frontal   lobe.    < end of copied text >      MEDICATIONS  (STANDING):  atorvastatin 20 milliGRAM(s) Oral at bedtime  dexAMETHasone     Tablet 4 milliGRAM(s) Oral every 12 hours  escitalopram 5 milliGRAM(s) Oral daily  fludroCORTISONE 0.2 milliGRAM(s) Oral daily  fluticasone propionate 50 MICROgram(s)/spray Nasal Spray 1 Spray(s) Both Nostrils two times a day  hydrocortisone 1% Cream 1 Application(s) Topical daily  insulin lispro (ADMELOG) corrective regimen sliding scale   SubCutaneous Before meals and at bedtime  levETIRAcetam  IVPB 750 milliGRAM(s) IV Intermittent every 12 hours  melatonin 5 milliGRAM(s) Oral at bedtime  meropenem  IVPB 2000 milliGRAM(s) IV Intermittent every 8 hours  senna 2 Tablet(s) Oral at bedtime  sodium chloride 3 Gram(s) Oral every 6 hours  sucralfate 1 Gram(s) Oral every 12 hours  vancomycin  IVPB 1000 milliGRAM(s) IV Intermittent every 12 hours    MEDICATIONS  (PRN):  acetaminophen     Tablet .. 650 milliGRAM(s) Oral every 6 hours PRN Temp greater or equal to 38C (100.4F), Mild Pain (1 - 3)  benzocaine/menthol Lozenge 1 Lozenge Oral every 4 hours PRN Sore Throat  ondansetron Injectable 4 milliGRAM(s) IV Push every 6 hours PRN Nausea and/or Vomiting

## 2023-07-18 NOTE — CONSULT NOTE ADULT - CONVERSATION DETAILS
Pt altered and unable to participate in discussion. Met at bedside with pts son in law/caregiver, Wood, to discuss diagnosis, GOC, treatment preferences and to provide support.     Reviewed hospital course: awaiting eeg results and further workup of potential infectious process that may be contributing to pts altered mentation. Reviewed onc history: history of resection and last dose of avastin in March. Wood and his wife (pts daughter, Roxanne) have appropriate understanding that imaging of patient's brain suggests potential progression/recurrence of disease versus "stable" residual tumor.  GOC and treatment preferences are aggressive; if further interventions for patient's cancer are offered -- surgical or systemic -- they will pursue. Discussed role of functional and nutritional statuses in order to be offered DMTx.     Reviewed risks/benefits/potential complications of cpr/intubation in light of potential disease recurrence and overall    incomplete Pt altered and unable to participate in discussion. Met at bedside with pts son in law/caregiver, Wood, to discuss diagnosis, GOC, treatment preferences and to provide support.     Reviewed hospital course: awaiting eeg results and further workup of potential infectious process that may be contributing to pts altered mentation. Reviewed onc history: history of resection and last dose of avastin in March. Wood and his wife (pts daughter, Roxanne) have appropriate understanding that imaging of patient's brain suggests potential progression/recurrence of disease versus "stable" residual tumor.  GOC and treatment preferences are aggressive; if further interventions for patient's cancer are offered -- surgical or systemic -- they will pursue. Discussed role of functional and nutritional statuses in order to be offered DMTx.     Reviewed risks/benefits/potential complications of cpr/intubation in light of potential disease recurrence and trajectory. Aggressive treatment preferences extend to their resuscitation wishes, pt remains full code.     Ongoing support provided. Patient and Family Support counselor to follow up.

## 2023-07-18 NOTE — CONSULT NOTE ADULT - PROBLEM SELECTOR RECOMMENDATION 5
.  Complex medical decision making / symptom management in the setting of advanced illness.    Coping:  well[  ] with difficulty[  ] poor coping[  ] unable to assess[x  ]   Support system: strong[ x ] adequate[  ] inadequate[  ]    Active Psychosocial Referrals:  SW/CM[  ] PT/OT[  ] Hospice[  ]  Ethics[  ]    - For new or uncontrolled symptoms, please call Palliative Care at [phone]. The service is available 24/7 (including nights & weekends) to provide symptom management recommendations over the phone as appropriate  - Will continue to follow for ongoing GOC discussion / titration of palliative regimen. Emotional support provided, questions answered.

## 2023-07-18 NOTE — CONSULT NOTE ADULT - ASSESSMENT
67yo M with PMHx glioblastoma s/p resection 1/27/2022, LD Avastin treatment 3/2023 cb chronic hyponatremia, type 2 diabetes, iron deficiency anemia, tracheal stenosis s/p tracheostomy (which was closed by ENT 7/1); presenting with altered mental status and weakness x2 days. Complicated hospital course. Palliative Care cs for support.  67yo M with PMHx glioblastoma s/p resection 1/27/2022, LD Avastin treatment 3/2023 cb chronic hyponatremia, type 2 diabetes, iron deficiency anemia, tracheal stenosis s/p tracheostomy (which was closed by ENT 7/1); presenting with altered mental status and weakness x2 days. Complicated hospital course. MRI suspicious for recurrent tumor and hydro. Palliative Care cs for support.

## 2023-07-18 NOTE — CONSULT NOTE ADULT - PROBLEM SELECTOR RECOMMENDATION 2
.  iso SIRS with end organ damage. Etiology unclear. Imaging noted.   - Neuro cs: on eeg  - Hospitalist medicine: on abx. BCx pending. CT C/A/P to explore for source Infection. DDx includes trachitis, Upper UTI.  If workup negative would need to consider r/o meningitis iso HA  - Delirium precautions: bedside window with blinds open; frequent re-orientation; pictures of family if possible; minimize lines, physical restraints, nighttime awakenings as medically feasible; ensure bowel movements daily or every other day, monitor for urinary retention; avoid anticholinergic medications or benzodiazepines as clinically feasible.

## 2023-07-18 NOTE — CONSULT NOTE ADULT - PROBLEM SELECTOR RECOMMENDATION 3
.  s/p resection 1/27/2022, last dose of Avastin treatment 3/2023. CTH 7/7: Interval development of a 13 mm hyperdense nodule along the inferior margin of the resection cavity which may represent progression  of disease with hemorrhage. MRI suspicious for recurrent tumor and hydro   - currently with acute medical issues, poor candidate for systemic therapies   - GOC aggressive, family wants to discuss DMTx that may be offered   - If no further disease modifying treatments offered or patient/family declined further disease modifying treatments,  patient would qualify for hospice

## 2023-07-18 NOTE — CHART NOTE - NSCHARTNOTEFT_GEN_A_CORE
7.18.23: Vanco/Jone started for sepsis. Keppra increased per epilepsy recs. CTA chest and CT A/P completed, pending reads.

## 2023-07-18 NOTE — CONSULT NOTE ADULT - SUBJECTIVE AND OBJECTIVE BOX
HPI  68y Male     Epilepsy risk factors:  Head injury with subsequent LOC?:  Febrile seizures in infancy?:  Hx of CNS infection?:  Family hx of epilepsy?:  Known CNS pathology?:  Birth history:     Prior AEDs      Prior imaging     Prior EEGs     Other diagnostic work-up     ROS  Per HPI, otherwise negative for constitutional/eyes/ENMT/cardiovascular/respiratory/GI//musculoskeletal/skin/breasts/psych/hem/lymph.     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      Type 2 diabetes mellitus      Hyperlipidemia      Iron deficiency anemia      Nephrolithiasis      Thrombocytopenia      Hyponatremia      BPH (benign prostatic hyperplasia)      S/P craniotomy      H/O tracheostomy           FAMILY HISTORY:  FH: diabetes mellitus (Father, Mother)    FH: hyperlipidemia (Father)    FH: hypertension (Father, Mother)         Social History:  Alcohol, illicits, smoking?:  Driving?:  Occupation:     Allergies  amoxicillin (Rash)       MEDICATIONS  (STANDING):  atorvastatin 20 milliGRAM(s) Oral at bedtime  dexAMETHasone     Tablet 4 milliGRAM(s) Oral every 12 hours  escitalopram 5 milliGRAM(s) Oral daily  fludroCORTISONE 0.2 milliGRAM(s) Oral daily  fluticasone propionate 50 MICROgram(s)/spray Nasal Spray 1 Spray(s) Both Nostrils two times a day  hydrocortisone 1% Cream 1 Application(s) Topical daily  insulin lispro (ADMELOG) corrective regimen sliding scale   SubCutaneous Before meals and at bedtime  levETIRAcetam  IVPB 750 milliGRAM(s) IV Intermittent every 12 hours  melatonin 5 milliGRAM(s) Oral at bedtime  meropenem  IVPB 2000 milliGRAM(s) IV Intermittent every 8 hours  senna 2 Tablet(s) Oral at bedtime  sodium chloride 3 Gram(s) Oral every 6 hours  sodium chloride 0.9%. 1000 milliLiter(s) (70 mL/Hr) IV Continuous <Continuous>  sucralfate 1 Gram(s) Oral every 12 hours  vancomycin  IVPB 1000 milliGRAM(s) IV Intermittent every 12 hours    MEDICATIONS  (PRN):  acetaminophen     Tablet .. 650 milliGRAM(s) Oral every 6 hours PRN Temp greater or equal to 38C (100.4F), Mild Pain (1 - 3)  benzocaine/menthol Lozenge 1 Lozenge Oral every 4 hours PRN Sore Throat  ondansetron Injectable 4 milliGRAM(s) IV Push every 6 hours PRN Nausea and/or Vomiting       T(C): 36.3 (07-18-23 @ 09:00), Max: 38.1 (07-17-23 @ 18:30)  HR: 104 (07-18-23 @ 11:44) (104 - 182)  BP: 145/78 (07-18-23 @ 11:44) (130/56 - 209/114)  RR: 17 (07-18-23 @ 11:44) (17 - 21)  SpO2: 96% (07-18-23 @ 11:44) (93% - 98%)  Wt(kg): --    gen: no acute distress.  HEENT: trachea midline, no jaundice or icterus.  CV: RRR, s1+s2, - m/r/g  Pulm: CTAB  Abd: soft, nt, nd  Ext: well-perfused, no edema.  Alertness: eyes open, pt attentive to examiner.  Orientation: person accurate, date accurate, place accurate.  Language: naming intact. Repetition intact. No paraphasias or neologisms. Fluent with appropriate sentences.  Attention: TABLE forwards intact, backwards intact.  Calculation: 7 quarters in $1.75.  Visual/tactile neglect?: none.  Memory: registration of 3 words intact, repetition at 5 minutes intact.  Right left confusion?: no.  Finger agnosia?: no.  II: Visual fields intact.  III, IV, VI: EOMI. No nystagmus. PERRLA 3>2 bilaterally. Discs sharp.  V: intact to light touch.  VII: smile symmetrical. Eyebrow elevation symmetrical. Eye closure symmetrical. No flattening of nasolabial fold.  VIII: Able to localize finger rub.  IX, X: palate elevation symmetrical.  XI: sternocleidomastoid 5R/5L, trapezius 5R/5L  XII: no tongue deviation.  Motor: no atrophy or fasciculations. No tremor. No hypo/hyperkinesia. No pronator drift. Tone normal. Finger tap rapid and equal on both sides. Strength: deltoids 5R/5L, biceps 5R/5L, triceps 5R/5L, wrist flexors 5R/5L, wrist extensors 5R/5L,  strong and equal R/L, hip flexors 5R/5L, leg flexors 5R/5L, leg extensors 5R/5L, ankle plantarflexion 5R/5L, ankle dorsiflexion 5R/5L  Reflexes: biceps 2R/2L, triceps 2R/2L, brachioradialis 2R/2L, patellar 2R/2L, ankles 2R/2L. Toes down R/L  Coordination: finger to nose without dysmetria or overshoot R/L. Heel to gonzalez without dysmetria or overshoot R/L. Romberg negative.  Gait: normal heel/toe/tandem.  Sensory: intact on R and L hemibody to light touch, pin, proprioception, vibration.     Labs  CBC Full  -  ( 18 Jul 2023 05:30 )  WBC Count : 28.59 K/uL  RBC Count : 3.66 M/uL  Hemoglobin : 12.2 g/dL  Hematocrit : 35.6 %  Platelet Count - Automated : 84 K/uL  Mean Cell Volume : 97.3 fl  Mean Cell Hemoglobin : 33.3 pg  Mean Cell Hemoglobin Concentration : 34.3 gm/dL  Auto Neutrophil # : x  Auto Lymphocyte # : x  Auto Monocyte # : x  Auto Eosinophil # : x  Auto Basophil # : x  Auto Neutrophil % : x  Auto Lymphocyte % : x  Auto Monocyte % : x  Auto Eosinophil % : x  Auto Basophil % : x    07-18    133<L>  |  92<L>  |  13  ----------------------------<  197<H>  3.7   |  27  |  0.52    Ca    8.8      18 Jul 2023 05:30  Phos  3.7     07-18  Mg     2.0     07-18    TPro  6.5  /  Alb  3.8  /  TBili  1.4<H>  /  DBili  0.3  /  AST  25  /  ALT  75<H>  /  AlkPhos  64  07-17    LIVER FUNCTIONS - ( 17 Jul 2023 19:22 )  Alb: 3.8 g/dL / Pro: 6.5 g/dL / ALK PHOS: 64 U/L / ALT: 75 U/L / AST: 25 U/L / GGT: x           PT/INR - ( 17 Jul 2023 14:34 )   PT: 12.8 sec;   INR: 1.07          PTT - ( 17 Jul 2023 14:34 )  PTT:21.0 sec      EEG (7/18):  1) Mild to moderate generalized   slowing suggestive of a mild to moderate degree of diffuse or multifocal  cerebral dysfunction.  2) Focal slowing over left hemisphere, more prominent in frontotemporal region suggestive of focal cerebral dysfunction  3) Occasional to frequent sharply contoured waves over left frontotemporal region, variable max, indicating underlying hyperexcitability.

## 2023-07-18 NOTE — CHART NOTE - NSCHARTNOTEFT_GEN_A_CORE
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      Type 2 diabetes mellitus      Hyperlipidemia      Iron deficiency anemia      Nephrolithiasis      Thrombocytopenia      Hyponatremia      BPH (benign prostatic hyperplasia)      S/P craniotomy      H/O tracheostomy          Current Nutrition Order:  NPO    PO Intake: N/A, NPO     GI Issues: no nvdc; LBM 7/17  - bowel regimen ordered     Pain: pain scale 0     Skin Integrity:  bruised; with wound to midline  No edema noted; remains free of PUs  diana scale 17     Labs:   07-18    133<L>  |  92<L>  |  13  ----------------------------<  197<H>  3.7   |  27  |  0.52    Ca    8.8      18 Jul 2023 05:30  Phos  3.7     07-18  Mg     2.0     07-18    TPro  6.5  /  Alb  3.8  /  TBili  1.4<H>  /  DBili  0.3  /  AST  25  /  ALT  75<H>  /  AlkPhos  64  07-17    CAPILLARY BLOOD GLUCOSE      POCT Blood Glucose.: 134 mg/dL (18 Jul 2023 11:24)  POCT Blood Glucose.: 221 mg/dL (18 Jul 2023 06:29)  POCT Blood Glucose.: 258 mg/dL (17 Jul 2023 21:44)  POCT Blood Glucose.: 210 mg/dL (17 Jul 2023 19:22)  POCT Blood Glucose.: 210 mg/dL (17 Jul 2023 17:08)      Medications:  MEDICATIONS  (STANDING):  atorvastatin 20 milliGRAM(s) Oral at bedtime  dexAMETHasone     Tablet 4 milliGRAM(s) Oral every 12 hours  escitalopram 5 milliGRAM(s) Oral daily  fludroCORTISONE 0.2 milliGRAM(s) Oral daily  fluticasone propionate 50 MICROgram(s)/spray Nasal Spray 1 Spray(s) Both Nostrils two times a day  hydrocortisone 1% Cream 1 Application(s) Topical daily  insulin lispro (ADMELOG) corrective regimen sliding scale   SubCutaneous Before meals and at bedtime  levETIRAcetam  IVPB 750 milliGRAM(s) IV Intermittent every 12 hours  melatonin 5 milliGRAM(s) Oral at bedtime  meropenem  IVPB 2000 milliGRAM(s) IV Intermittent every 8 hours  senna 2 Tablet(s) Oral at bedtime  sodium chloride 3 Gram(s) Oral every 6 hours  sodium chloride 0.9%. 1000 milliLiter(s) (70 mL/Hr) IV Continuous <Continuous>  sucralfate 1 Gram(s) Oral every 12 hours  vancomycin  IVPB 1000 milliGRAM(s) IV Intermittent every 12 hours    MEDICATIONS  (PRN):  acetaminophen     Tablet .. 650 milliGRAM(s) Oral every 6 hours PRN Temp greater or equal to 38C (100.4F), Mild Pain (1 - 3)  benzocaine/menthol Lozenge 1 Lozenge Oral every 4 hours PRN Sore Throat  ondansetron Injectable 4 milliGRAM(s) IV Push every 6 hours PRN Nausea and/or Vomiting      Weight:  Daily     Daily     Weight Change: no new wts to review at this time; last wt taken on admit 7/7    Estimated energy needs:   Weight used for calculations	current weight  Estimated Energy Needs Weight (lbs)	160 lb  Estimated Energy Needs Weight (kg)	72.5 kg    Estimated Energy Needs From (ji/kg) 30  Estimated Energy Needs To (ji/kg)	35  Estimated Energy Needs Calculated From (ji/kg) 2175  Estimated Energy Needs Calculated To (ji/kg)	2537    Estimated Protein Needs From (g/kg)	1.2  Estimated Protein Needs To (g/kg)	1.5  Estimated Protein Needs Calculated From (g/kg) 87  Estimated Protein Needs Calculated To (g/kg)	108.75    Other Calculations	Based on Standards of Care pt within % IBW (108%) thus actual body weight used for all calculations (160#). Needs adjusted for advanced age and malnutrition. Fluid recs per team in view of hyponatremia.    Subjective: 69YO male with a past medical history of type 2 diabetes, hyperlipidemia, iron deficiency anemia, tracheal stenosis s/p tracheostomy (which was closed by ENT 7 days ago); glioblastoma s/p resection 1/27/2022, and Avastin treatment 3/2023 presenting with altered mental status and weakness x2 days. Per family, they last saw him last night around 6pm when he was at baseline. Patient has intermittent expressive aphasia but is getting worse and now is persistent, and has intermittent nausea and vomiting since last night. 7/16: 1L fluid restriction. 7/17 Stroke code; NPO status until dysphagia screening.    Attempted to see pt x3 however other team members in the room. Defer assessment to EMR- currently NPO for stroke call 7/17. Awaiting dysphagia screen/s&s eval. Nutrition related labs reviewed; low Na (133)- defer fluids to team; high FSBG (134, 221, 258)- insulin regimen ordered. No Gi distress noted. RD to remain available. Please view full recs below     Previous Nutrition Diagnosis: Malnutrition (moderate) related to chronic disease as evidenced by <75% nutrition needs >1 month, mild muscle wasting     Active [ X ]  Resolved [   ]    Goal:  - Consistently meets > 75% EER   - does not show further s/s of malnutrition     Recommendations:  1. NPO until dysphagia screening   > align nutrition with SLP recs at all times   > if able to resume po, recommend restart previous diet order (conscho)  > if unable to resume po, consider alternate means of nutrition   2. Continue fluids per medical team  3. Monitor GI fxn, skin integrity, labs, wts   > adjust bowel regimen prn to promote optimal stooling   > c/w insulin regimen to reach euglycemia; FSBG q4-6hrs   4. 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      Type 2 diabetes mellitus      Hyperlipidemia      Iron deficiency anemia      Nephrolithiasis      Thrombocytopenia      Hyponatremia      BPH (benign prostatic hyperplasia)      S/P craniotomy      H/O tracheostomy          Current Nutrition Order:  NPO    PO Intake: N/A, NPO     GI Issues: no nvdc; LBM 7/17  - bowel regimen ordered     Pain: pain scale 0     Skin Integrity:  bruised; with wound to midline  No edema noted; remains free of PUs  diana scale 17     Labs:   07-18    133<L>  |  92<L>  |  13  ----------------------------<  197<H>  3.7   |  27  |  0.52    Ca    8.8      18 Jul 2023 05:30  Phos  3.7     07-18  Mg     2.0     07-18    TPro  6.5  /  Alb  3.8  /  TBili  1.4<H>  /  DBili  0.3  /  AST  25  /  ALT  75<H>  /  AlkPhos  64  07-17    CAPILLARY BLOOD GLUCOSE      POCT Blood Glucose.: 134 mg/dL (18 Jul 2023 11:24)  POCT Blood Glucose.: 221 mg/dL (18 Jul 2023 06:29)  POCT Blood Glucose.: 258 mg/dL (17 Jul 2023 21:44)  POCT Blood Glucose.: 210 mg/dL (17 Jul 2023 19:22)  POCT Blood Glucose.: 210 mg/dL (17 Jul 2023 17:08)      Medications:  MEDICATIONS  (STANDING):  atorvastatin 20 milliGRAM(s) Oral at bedtime  dexAMETHasone     Tablet 4 milliGRAM(s) Oral every 12 hours  escitalopram 5 milliGRAM(s) Oral daily  fludroCORTISONE 0.2 milliGRAM(s) Oral daily  fluticasone propionate 50 MICROgram(s)/spray Nasal Spray 1 Spray(s) Both Nostrils two times a day  hydrocortisone 1% Cream 1 Application(s) Topical daily  insulin lispro (ADMELOG) corrective regimen sliding scale   SubCutaneous Before meals and at bedtime  levETIRAcetam  IVPB 750 milliGRAM(s) IV Intermittent every 12 hours  melatonin 5 milliGRAM(s) Oral at bedtime  meropenem  IVPB 2000 milliGRAM(s) IV Intermittent every 8 hours  senna 2 Tablet(s) Oral at bedtime  sodium chloride 3 Gram(s) Oral every 6 hours  sodium chloride 0.9%. 1000 milliLiter(s) (70 mL/Hr) IV Continuous <Continuous>  sucralfate 1 Gram(s) Oral every 12 hours  vancomycin  IVPB 1000 milliGRAM(s) IV Intermittent every 12 hours    MEDICATIONS  (PRN):  acetaminophen     Tablet .. 650 milliGRAM(s) Oral every 6 hours PRN Temp greater or equal to 38C (100.4F), Mild Pain (1 - 3)  benzocaine/menthol Lozenge 1 Lozenge Oral every 4 hours PRN Sore Throat  ondansetron Injectable 4 milliGRAM(s) IV Push every 6 hours PRN Nausea and/or Vomiting      Weight:  Daily     Daily     Weight Change: no new wts to review at this time; last wt taken on admit 7/7    Estimated energy needs:   Weight used for calculations	current weight  Estimated Energy Needs Weight (lbs)	160 lb  Estimated Energy Needs Weight (kg)	72.5 kg    Estimated Energy Needs From (ji/kg) 30  Estimated Energy Needs To (ji/kg)	35  Estimated Energy Needs Calculated From (ji/kg) 2175  Estimated Energy Needs Calculated To (ji/kg)	2537    Estimated Protein Needs From (g/kg)	1.2  Estimated Protein Needs To (g/kg)	1.5  Estimated Protein Needs Calculated From (g/kg) 87  Estimated Protein Needs Calculated To (g/kg)	108.75    Other Calculations	Based on Standards of Care pt within % IBW (108%) thus actual body weight used for all calculations (160#). Needs adjusted for advanced age and malnutrition. Fluid recs per team in view of hyponatremia.    Subjective: 69YO male with a past medical history of type 2 diabetes, hyperlipidemia, iron deficiency anemia, tracheal stenosis s/p tracheostomy (which was closed by ENT 7 days ago); glioblastoma s/p resection 1/27/2022, and Avastin treatment 3/2023 presenting with altered mental status and weakness x2 days. Per family, they last saw him last night around 6pm when he was at baseline. Patient has intermittent expressive aphasia but is getting worse and now is persistent, and has intermittent nausea and vomiting since last night. 7/16: 1L fluid restriction. 7/17 Stroke code; NPO status until dysphagia screening.    Attempted to see pt x3 however other team members in the room. Defer assessment to EMR- currently NPO for stroke call 7/17. Awaiting dysphagia screen/s&s eval. Nutrition related labs reviewed; low Na (133)- defer fluids to team; high FSBG (134, 221, 258)- insulin regimen ordered; K wnl- low prior; phosph wnl- low prior. No GI distress noted. RD to remain available. Please view full recs below     Previous Nutrition Diagnosis: Malnutrition (moderate) related to chronic disease as evidenced by <75% nutrition needs >1 month, mild muscle wasting     Active [ X ]  Resolved [   ]    Goal:  - Consistently meets > 75% EER   - does not show further s/s of malnutrition     Recommendations:  1. NPO until dysphagia screening   > align nutrition with SLP recs at all times   > if able to resume po, recommend restart previous diet order (conscho)  > if unable to resume po, consider alternate means of nutrition   2. Continue fluids per medical team  3. Monitor GI fxn, skin integrity, labs, wts   > adjust bowel regimen prn to promote optimal stooling   > c/w insulin regimen to reach euglycemia; FSBG q4-6hrs   > monitor lytes, replete prn   4. RD to remain available for recs adjustment prn or will follow up pt per organizational policy     Education: deferred     Risk Level: High [ X ] Moderate [   ] Low [   ]

## 2023-07-18 NOTE — PROGRESS NOTE ADULT - ASSESSMENT
69YO male with a past medical history of type 2 diabetes, hyperlipidemia, iron deficiency anemia, tracheal stenosis s/p tracheostomy (which was closed by ENT 7 days ago); glioblastoma s/p resection 1/27/2022, and Avastin treatment 3/2023 presenting with expressive aphasia, nuasea and vomiting x1 day. CT shows new hyperdensity in left frontal parietal - surgical site c/f disease progression.      # Hyperdensity in left frontal parietal which may represent progression of disease.   -Management per primary team   -Continue Keppra 750mg BID  -Continue Decadron 4mg q12  -Pt's ASA 81 held i/s/o thrombocytopenia     #SIRS with end organ damage  - febrile, leukocytosis and tachycardia with elevated lactic acid.  No clear source.  Patient's only complaint is of headache which is improving, no photophobia.  UA negative, CXR wnl but was immediately after vomiting event  - agree with CT C/A/P to explore for source, per family patient has a hx of renal stones, concern for upper UTI however patient without CVA tenderness.  Has hx of pseudomonas in sputum.  If above workup negative would need to consider r/o meningitis given headache  - c/w empiric Vanc/Meropenem, meropenem dose for meningitis as not able to use ampicillin due to allergy to PCNs, documented as rash but patient and family cannot confirm  - f/u blood cultures  - add on procal and CRP to trend    #Rash  - per primary team started after URENA, will stop.  Rash resolving  - continue to monitor    #Hyponatremia   -Renal consult appreciated    -florinef 0.2mg daily, salt tabs 2gm q6   -Per Renal team the patient will need repeat BMP, Urine Na and Urine osm with PCP, early this week    #Type II Diabetes  - A1c 5.7  -Pt is on Januvia at home   -Continue ISS     #HLD  - Continue Atorvastatin 20mg daily     #Thrombocytopenia   -Pt's platelets are stable 70-100k  -Heme consulted for thrombocytopenia; Per Heme documentation the pt w/  history of thrombocytopenia with his baseline range  since the latter half of last year and that the pt's Pt's thrombocytopenia likely due to past treatment with temozolomide.   Per heme transfuse 1U plt if bleeding and platelet count <50k, if febrile and platelet count <20k,   Dopplers 7/8/23 negative for DVT    #Dispo:   -Pt is for Home PT   69YO male with a past medical history of type 2 diabetes, hyperlipidemia, iron deficiency anemia, tracheal stenosis s/p tracheostomy (which was closed by ENT 7 days ago); glioblastoma s/p resection 1/27/2022, and Avastin treatment 3/2023 presenting with expressive aphasia, nuasea and vomiting x1 day. CT shows new hyperdensity in left frontal parietal - surgical site c/f disease progression.  Patient was clinically stable for DC then developed acute deterioration on 7/17    #Hyper density in left frontal parietal which may represent progression of disease.   -Management per primary team   -Continue Keppra 750mg BID  -Continue Decadron 4mg q12  -Pt's ASA 81 held i/s/o thrombocytopenia     #SIRS with end organ damage  - febrile, leukocytosis and tachycardia with elevated lactic acid.  No clear source.  Patient's only complaint is of headache which is improving, no photophobia.  UA negative, CXR wnl but was immediately after vomiting event  - agree with CT C/A/P to explore for source  DDx includes trachitis, Upper UTI as per family patient has a hx of renal stones although patient w/o CVA tenderness, meningitis.  Has hx of pseudomonas in sputum.  If above workup negative would need to consider r/o meningitis given headache  - c/w empiric Vanc/Meropenem, meropenem dose for meningitis as not able to use ampicillin due to allergy to PCNs, documented as rash but patient and family cannot confirm  - f/u blood cultures  - add on procal and CRP to trend    #Rash  - per primary team started after URENA, will stop.  Rash resolving  - continue to monitor    #Tachycardia  - developed SVT to 170s during rapid response, responded to IV labetalol with return to NSR.  Currently sinus tachy to 120s on tele  - treat underlying cause, likely infection    #Hyponatremia   -Renal consult appreciated    -florinef 0.2mg daily, salt tabs 2gm q6   -trend BMP    #Type II Diabetes  - A1c 5.7  -Pt is on Januvia at home   -Continue ISS, q6 while NPO    #HLD  - Continue Atorvastatin 20mg daily     #Thrombocytopenia   -Pt's platelets are stable 70-100k  -Heme consulted for thrombocytopenia; Per Heme documentation the pt w/  history of thrombocytopenia with his baseline range  since the latter half of last year and that the pt's Pt's thrombocytopenia likely due to past treatment with temozolomide.   Per heme transfuse 1U plt if bleeding and platelet count <50k, if febrile and platelet count <20k,   Dopplers 7/8/23 negative for DVT    #Dispo:   -Pt is for Home PT pending medical improvement    Recommend GOC discussion with family given possible progression of disease   69YO male with a past medical history of type 2 diabetes, hyperlipidemia, iron deficiency anemia, tracheal stenosis s/p tracheostomy (which was closed by ENT 7 days ago); glioblastoma s/p resection 1/27/2022, and Avastin treatment 3/2023 presenting with expressive aphasia, nuasea and vomiting x1 day. CT shows new hyperdensity in left frontal parietal - surgical site c/f disease progression.  Patient was clinically stable for DC then developed acute deterioration on 7/17    #Hyper density in left frontal parietal which may represent progression of disease.   -Management per primary team   -Continue Keppra 750mg BID  -Continue Decadron 4mg q12  -Pt's ASA 81 held i/s/o thrombocytopenia     #SIRS with end organ damage  - febrile, leukocytosis and tachycardia with elevated lactic acid.  No clear source.  Patient's only complaint is of headache which is improving, no photophobia.  UA negative, CXR wnl but was immediately after vomiting event  - agree with CT C/A/P to explore for source  DDx includes trachitis, Upper UTI as per family patient has a hx of renal stones although patient w/o CVA tenderness, meningitis.  Has hx of pseudomonas in sputum.  If above workup negative would need to consider r/o meningitis given headache  - c/w empiric Vanc/Meropenem, meropenem dose for meningitis as not able to use ampicillin due to allergy to PCNs, documented as rash but patient and family cannot confirm  - f/u blood cultures  - add on procal and CRP to trend    #Rash  - per primary team started after URENA, will stop.  Rash resolving  - continue to monitor    #Tachycardia  - developed SVT to 170s during rapid response, responded to IV labetalol with return to NSR.  Currently sinus tachy to 120s on tele  - treat underlying cause, likely infection  - trops during HTN emergency/rapid response negative    #Metabolic acidosis with lactic acidosis  - likely due to SIRS, lactic acidosis improving, anion gap closed  - beta hydroxybutyrate and urine ketones negative    #Hyponatremia   -Renal consult appreciated    -florinef 0.2mg daily, salt tabs 2gm q6   -trend BMP    #Type II Diabetes  - A1c 5.7  -Pt is on Januvia at home   -Continue ISS, q6 while NPO    #HLD  - Continue Atorvastatin 20mg daily     #Thrombocytopenia   -Pt's platelets are stable 70-100k  -Heme consulted for thrombocytopenia; Per Heme documentation the pt w/  history of thrombocytopenia with his baseline range  since the latter half of last year and that the pt's Pt's thrombocytopenia likely due to past treatment with temozolomide.   Per heme transfuse 1U plt if bleeding and platelet count <50k, if febrile and platelet count <20k,   Dopplers 7/8/23 negative for DVT    #Dispo:   -Pt is for Home PT pending medical improvement    Recommend GOC discussion with family given possible progression of disease

## 2023-07-18 NOTE — CONSULT NOTE ADULT - PROBLEM SELECTOR RECOMMENDATION 9
.  pt became acutely altered 7/17. requires total assistance with all ADLs. pps 30%  - cw supportive care, repositioning, skin care  - PT eval when appropriate   - mgmt ams as below

## 2023-07-18 NOTE — CONSULT NOTE ADULT - AGENT'S NAME
reportedly Roxanne Norwood [phone] Map Statment: See Attach Map for Complete Details reportedly daughter Roxanne Norwood [phone]

## 2023-07-18 NOTE — PROGRESS NOTE ADULT - SUBJECTIVE AND OBJECTIVE BOX
HPI:  Osiel Norwood is a 69YO male with a past medical history of type 2 diabetes, hyperlipidemia, iron deficiency anemia, tracheal stenosis s/p tracheostomy (which was closed by ENT 7 days ago); glioblastoma s/p resection 1/27/2022, and Avastin treatment 3/2023 presenting with altered mental status and weakness x2 days. Per family, they last saw him last night around 6pm when he was at baseline. Patient has intermittent expressive aphasia but is getting worse and now is persistent, and has intermittent nausea and vomiting since last night. Patient was supposed to receive an outpatient brain MRI on 7/25/2023.  Stroke code performed in  is negative for CVA. Head CT shows new hyperdensity in frontal parietal - surgical site. MRI is ordered to rule out tumor recurrence.   Per daughter at bedside, Entresto and Eliquis were stopped per his PC - cardiologist.      (07 Jul 2023 13:10)    OVERNIGHT EVENTS: ANA LILIA overnight, remains on eeg. Neuro stable.     Hospital Course:   7/7: Admitted. Na 115 in ED with repeat 118, started 3% at 30cc/hr, and salt tabs 3q6, stability CTH in ED showing Interval development of a 13 mm hyperdense nodule along the   inferior margin of the resection cavity which may represent progression   of disease with hemorrhage, repeat CTH stable, urine studies ordered  7/8: Neuro stable, 12AM BMP Na114 3% increased to 40cc/hr, urine studies, pancx to r/o infection since pt is immunocompromised. Changed pantoprazole to sucralfate for GI ppx d/t thrombocytopenia. LE dopplers negative. DC'd 3%. ENT consulted to assess stoma, recommend follow up upon discharge with ENT, Repeat sodium 122. Restarted 3% at 30.  7/9: ANA LILIA o/n neuro stable. remains on 3%@30cc/hr. Started florinef, increased 3% to 50cc/hr. Increased 3% to 75cc/hr.    7/10: continued current 3% rate o/n. Na 127 --> 125, cont 3% @75cc/hr, f/u repeat Na this evening, likely stepdown tomorrow. Pend dispo home with home PT/OT when able. Heme consulted for thrombocytopenia  7/11: ANA LILIA o/n. Remains on 3% @75cc/hr. Decreased 3% to 50cc/hr. Started 1.2L fluid restriction. Repeat Na corrected 128  7/12: ANA LILIA overnight, remains on 3%, SDU from ICU  7/13: ANA LILIA overnight, neuro stable, on 3%@50, Am sodium 137, decreased 3% to 25cc/hr, repeat Na 138, decreased to 15cc/hr.  7/14: ANA LILIA overnight, neuro stable, remains on 3%@15cc/hr  7/15: ANA LILIA overnight. Neuro exam stable. Pt remains on 3% saline.Sodium 134 this AM remains on 3%@25. Per nephrology recs hypertonic Na d'ceed, Na tabs 2q6, URENA 15g BID, cont florinef/ fluid restriction.Per nephrology Na >130 ok when ready for d/c   7/16: ANA LILIA overnight, hypertonics d/c now on urea powder/1L fluid restriction/florinef and salt tabs per renal, hydrocortisone cream ordered for rash on back, rec for Home PT  7/17: ANA LILIA overnight, following Na, renal following, ENT saw for hoarse voice rec followup with CT surgery for possible dilation, pending Home PT. Patient developed sudden onset severe headache. Hyperventilating with increased work of breathing. Wheezing in all lung fields to auscultation. Neuro intact on exam. Duoneb x1 ordered. Dilaudid 0.25 IV given for pain control. Subsequently patient developed nausea with multiple episodes of vomiting. Hypertensive with SBP in the 190s. Given hydralazine 10mg IVP, Zofran. Stroke code and rapid response called. STAT labs sent. CTH/CTA/CTP completed. Tachycardic to the 180s, consistent with SVT, remained hypertensive. Given 10 of labetalol, SVT broke. Transferred to Telemetry. WBC 23.43, lactate 8.5. Given 1L fluid bolus. Pan culture sent. Started empirically on Vanc/Meropenem.   7/18: Put on eeg. Voiding while retaining urine, SC for 500cc.     Vital Signs Last 24 Hrs  T(C): 36.3 (17 Jul 2023 22:01), Max: 38.1 (17 Jul 2023 18:30)  T(F): 97.4 (17 Jul 2023 22:01), Max: 100.5 (17 Jul 2023 18:30)  HR: 114 (17 Jul 2023 23:49) (78 - 182)  BP: 136/74 (17 Jul 2023 23:49) (118/70 - 209/114)  BP(mean): 99 (17 Jul 2023 23:49) (99 - 154)  RR: 18 (17 Jul 2023 23:49) (16 - 21)  SpO2: 96% (17 Jul 2023 23:49) (93% - 98%)    Parameters below as of 17 Jul 2023 23:49  Patient On (Oxygen Delivery Method): nasal cannula  O2 Flow (L/min): 2      I&O's Summary    16 Jul 2023 07:01  -  17 Jul 2023 07:00  --------------------------------------------------------  IN: 350 mL / OUT: 1250 mL / NET: -900 mL    17 Jul 2023 07:01  -  18 Jul 2023 03:25  --------------------------------------------------------  IN: 2650 mL / OUT: 1625 mL / NET: 1025 mL      PHYSICAL EXAM:  Constitutional: awake, resting comfortably in bed, NAD. On eeg.   Respiratory: non-labored breathing. Normal chest rise.   Cardiovascular: Regular rate and rhythm  Gastrointestinal:  Soft, nontender, nondistended.  .  Vascular: Extremities warm, no ulcers, no discoloration of skin.   Neurological: Gen: AA&O x 3, minimally conversant.     CN II-XII grossly intact.    Motor: DUMONT x 4, 5/5 throughout UE/LE.    Sens: Sensation intact to light touch throughout.    Extremities: warm and well perfused     No pronator drift    TUBES/LINES:  [] Campuzano  [] Lumbar Drain  [] Wound Drains  [] Others      DIET:  [x] NPO  [] Mechanical  [] Tube feeds    LABS:                        11.9   23.43 )-----------( 99       ( 17 Jul 2023 18:07 )             34.1     07-17    134<L>  |  94<L>  |  17  ----------------------------<  228<H>  2.7<LL>   |  25  |  0.55    Ca    9.0      17 Jul 2023 19:22  Phos  2.2     07-17  Mg     1.6     07-17    TPro  6.5  /  Alb  3.8  /  TBili  1.4<H>  /  DBili  0.3  /  AST  25  /  ALT  75<H>  /  AlkPhos  64  07-17    PT/INR - ( 17 Jul 2023 14:34 )   PT: 12.8 sec;   INR: 1.07          PTT - ( 17 Jul 2023 14:34 )  PTT:21.0 sec  Urinalysis Basic - ( 17 Jul 2023 19:22 )    Color: x / Appearance: x / SG: x / pH: x  Gluc: 228 mg/dL / Ketone: x  / Bili: x / Urobili: x   Blood: x / Protein: x / Nitrite: x   Leuk Esterase: x / RBC: x / WBC x   Sq Epi: x / Non Sq Epi: x / Bacteria: x      CARDIAC MARKERS ( 17 Jul 2023 14:34 )  x     / x     / 36 U/L / x     / <1.0 ng/mL      CAPILLARY BLOOD GLUCOSE      POCT Blood Glucose.: 258 mg/dL (17 Jul 2023 21:44)  POCT Blood Glucose.: 210 mg/dL (17 Jul 2023 19:22)  POCT Blood Glucose.: 210 mg/dL (17 Jul 2023 17:08)  POCT Blood Glucose.: 242 mg/dL (17 Jul 2023 14:31)  POCT Blood Glucose.: 329 mg/dL (17 Jul 2023 11:45)  POCT Blood Glucose.: 177 mg/dL (17 Jul 2023 07:03)      Drug Levels: [] N/A    CSF Analysis: [] N/A      Allergies    amoxicillin (Rash)    Intolerances      MEDICATIONS:  Antibiotics:  meropenem  IVPB 1000 milliGRAM(s) IV Intermittent every 8 hours  meropenem  IVPB 1000 milliGRAM(s) IV Intermittent every 8 hours  vancomycin  IVPB 1000 milliGRAM(s) IV Intermittent every 12 hours    Neuro:  acetaminophen     Tablet .. 650 milliGRAM(s) Oral every 6 hours PRN  escitalopram 5 milliGRAM(s) Oral daily  levETIRAcetam  IVPB 750 milliGRAM(s) IV Intermittent every 12 hours  melatonin 5 milliGRAM(s) Oral at bedtime  ondansetron Injectable 4 milliGRAM(s) IV Push every 6 hours PRN    Anticoagulation:    OTHER:  atorvastatin 20 milliGRAM(s) Oral at bedtime  benzocaine/menthol Lozenge 1 Lozenge Oral every 4 hours PRN  dexAMETHasone     Tablet 4 milliGRAM(s) Oral every 12 hours  fludroCORTISONE 0.2 milliGRAM(s) Oral daily  fluticasone propionate 50 MICROgram(s)/spray Nasal Spray 1 Spray(s) Both Nostrils two times a day  hydrocortisone 1% Cream 1 Application(s) Topical daily  insulin lispro (ADMELOG) corrective regimen sliding scale   SubCutaneous Before meals and at bedtime  senna 2 Tablet(s) Oral at bedtime  sucralfate 1 Gram(s) Oral every 12 hours    IVF:  sodium chloride 3 Gram(s) Oral every 6 hours    CULTURES:  Culture Results:   No growth at 5 days. (07-07 @ 23:47)  Culture Results:   No growth at 5 days. (07-07 @ 23:47)    RADIOLOGY & ADDITIONAL TESTS:      ASSESSMENT:  Pt is a 69YO male with a past medical history of type 2 diabetes, hyperlipidemia, iron deficiency anemia, tracheal stenosis s/p tracheostomy (which was closed by ENT 7 days ago); glioblastoma s/p resection 1/27/2022, and Avastin treatment 3/2023 presenting with expressive aphasia, nuasea and vomiting x1 day. CT shows new hyperdensity in left frontal parietal - surgical site c/f disease progression. Na in the .     ALTERED MENTAL STATUS;BRAIN MASS    Abnormal electrocardiogram [ECG] [EKG]    Allergy, unspecified, initial encounter    Anemia, unspecified    Benign prostatic hyperplasia without lower urinary tract symptoms    Calculus of kidney    Cough, unspecified    Depression, unspecified    ENCOUNTER FOR ATTENT    Encounter for general adult medical examination without abnormal findings    Encounter for immunization    Encounter for immunization safety counseling    Encounter for other preprocedural examination    Encounter for screening for malignant neoplasm of prostate    Encounter for screening for other metabolic disorders    Encounter for screening for other viral diseases    ESSENTIAL (PRIMARY)    Gastro-esophageal reflux disease without esophagitis    Generalized anxiety disorder    History of Glioblastoma    Hyperlipidemia, unspecified    Hypo-osmolality and hyponatremia    Hypotension, unspecified    Iron deficiency    Iron deficiency anemia, unspecified    LONG TERM (CURRENT)    LONG TERM (CURRENT)    LONG TERM (CURRENT)    Malignant neoplasm of brain, unspecified    Nausea with vomiting, unspecified    Other constipation    Other fatigue    Other injury of unspecified body region, initial encounter    OTHER SPECIFIED DISE    Personal history of malignant neoplasm, unspecified    Personal history of other diseases of the nervous system and sense organs    Personal history of other endocrine, nutritional and metabolic disease    Personal history of other mental and behavioral disorders    Personal history of transient ischemic attack (TIA), and cerebral infarction without residual deficits    POSTPROCEDURAL SUBGL    Pruritus, unspecified    Shortness of breath    Tachycardia, unspecified    Thrombocytopenia, unspecified    Unspecified abdominal pain    Unspecified visual disturbance    Urinary calculus, unspecified    Vitamin D deficiency, unspecified    No pertinent family history in first degree relatives    FH: diabetes mellitus (Father, Mother)    FH: hyperlipidemia (Father)    FH: hypertension (Father, Mother)    Handoff    MEWS Score    No pertinent past medical history    Diabetes mellitus    Hypertension    Glioblastoma    Type 2 diabetes mellitus    Hyperlipidemia    Iron deficiency anemia    Nephrolithiasis    Thrombocytopenia    Hyponatremia    BPH (benign prostatic hyperplasia)    No pertinent past medical history    Altered mental status    Hyponatremia    Hypertension    Glioblastoma    Type 2 diabetes mellitus    Hyperlipidemia    Iron deficiency anemia    Thrombocytopenia    BPH (benign prostatic hyperplasia)    Hyponatremia    No significant past surgical history    No significant past surgical history    S/P craniotomy    H/O tracheostomy    No significant past surgical history    MED EVAL    Type 2 diabetes mellitus    BPH (benign prostatic hyperplasia)    90+    Room Service Assist    Brain mass    Hypertension    Glioblastoma    Type 2 diabetes mellitus    Hyperlipidemia    Iron deficiency anemia    Thrombocytopenia    BPH (benign prostatic hyperplasia)    Tracheal stenosis    [email or web address]        PLAN:    Plan:  Neuro:  -neuro q4/vitals q4hrs   -CTH 7/7: Interval development of a 13 mm hyperdense nodule along the   inferior margin of the resection cavity which may represent progression   of disease with hemorrhage. Repeat CTH stable, CTH 7/17 stable   -CTA 7/7: negative, CTA 7/17 stable  -CTP 7/17 stable.   -pain control prn  -c/w home Keppra 750mg BID  -Decadron 4mg BID  -c/w home Lexapro  -home ASA 81 held i/s/o thrombocytopenia   -MRI suspicious for recurrent tumor and hydro   -eeg 7/17, f/u read     Pulm:  -satting well on RA    Cardio:  -SBP<160    GI:  -CCD with 1L fluid restriction  -bowel regimen  -sucralfate  -last BM 7/15    Renal:  -hyponatremic, Na 118 on admission  -1.0L fluid restriction started 7/11  -3% d/c 7/15  -florinef 0.2mg daily, salt tabs 3q6  -voiding, sc prn   -dc'd urea powder 15g BID started per Renal d/t petechial rash     Endo:  -ISS  - A1c 5.7  -h/o T2DM: home Januvia held  -h/o HLD: c/w Atorvastatin 20mg    Heme:  -SCDs for DVT ppx, SQL held for thrombocytopenia   -heme consulted for thrombocytopenia- w/u sent, likely chronic thrombocytopenia d/t chemotherapy agent in past   -LE dopplers 7/8 negative    ID:  -afebrile  -pan cx 7/7    Dispo: regional status, full code, Home PT    D/w Dr. Dorado       []  GCS  E   V  M     Heart Failure: []Acute, [] acute on chronic , []chronic  Heart Failure:  [] Diastolic (HFpEF), [] Systolic (HFrEF), []Combined (HFpEF and HFrEF), [] RHF, [] Pulm HTN, [] Other    [] EUGENIA, [] ATN, [] AIN, [] other  [] CKD1, [] CKD2, [] CKD 3, [] CKD 4, [] CKD 5, []ESRD    Encephalopathy: [] Metabolic, [] Hepatic, [] toxic, [] Neurological, [] Other    Abnormal Nurtitional Status: [] malnurtition (see nutrition note), [ ]underweight: BMI < 19, [] morbid obesity: BMI >40, [] Cachexia    [] Sepsis  [] hypovolemic shock,[] cardiogenic shock, [] hemorrhagic shock, [] neuogenic shock  [] Acute Respiratory Failure  []Cerebral edema, [] Brain compression/ herniation,   [] Functional quadriplegia  [] Acute blood loss anemia

## 2023-07-18 NOTE — CHART NOTE - NSCHARTNOTEFT_GEN_A_CORE
Infectious Diseases Anti-infective Approval Note    Medication:  Meropenem  Dose:  2 grams  Route:  IV  Frequency:  q8hrs  Duration:  3 days    Duration refers to duration of approval, not recommended duration of treatment    Dose may be adjusted as needed for alterations in renal function.    *THIS IS NOT AN INFECTIOUS DISEASES CONSULTATION*

## 2023-07-18 NOTE — CONSULT NOTE ADULT - ASSESSMENT
Assessment:  69YO male with a past medical history of type 2 diabetes, hyperlipidemia, iron deficiency anemia, tracheal stenosis s/p tracheostomy (which was closed by ENT 7 days ago); glioblastoma s/p resection 1/27/2022, and Avastin treatment 3/2023 presenting with expressive aphasia, nuasea and vomiting x1 day. CT shows new hyperdensity in left frontal parietal - surgical site c/f disease progression.  Patient was clinically stable for DC then developed acute deterioration on 7/17          Recommendations:  ***INCOMPLETE***  - Switch Meropenen (lowers seizure threshold)  - Seizure & Fall precautions  - Ativan 2mg IVP for seizures > 2mins  - Keep Mg>2 and K >4.   - Management per primary team.     Please call us if any questions or neurological changes.             Assessment:  67 y/o male with a past medical history of type 2 diabetes, hyperlipidemia, iron deficiency anemia, tracheal stenosis s/p tracheostomy (which was closed by ENT 7 days ago); glioblastoma s/p resection 1/27/2022, and Avastin treatment 3/2023 presenting with expressive aphasia, nausea and vomiting, and overall deterioration of his mental status since last May per family.  CTH from this admission shows new hyperdensity in left frontal parietal - surgical site c/f disease progression.  Patient was clinically stable for DC then developed acute deterioration on 7/17. Patient complained of a severe frontal headache, accompanied by bilateral arm jerks facial twitching, and difficulty breathing. He developed a fever, worsening leukocytosis, increasing lactate, and SVT. Infectious etiology suspected, so he was started on vanco and Jone empirically. No source so far. Patient continues to complain about a headache, but according to family his mental status has improved significantly since yesterday.     Given the patient's history seizures can't be completely ruled out as a contributing factor of his current presentation, however lab results like his lactate cannot be attributed to seizure activity in the absence of a full tonic-clonic event. Difficult to establish is there is neck rigidity on exam as the patient has difficulties following commands. Pt was started on Keppra prophylactically after his GBM resection in 2022. His dose was increased to 750mg BID last year after the pt's family noticed occasional arm jerking movements. vEEG did not capture any seizures but there are indications of underlying hyperexcitability. Pt has several risk factors for breakthrough seizures given the progression of his disease, his acute worsening and Meropenem therapy.        Recommendations:   - c/w vEEG for now.   - Increase Keppra to 1000mg BID  - d/c Meropenen if possible (lowers seizure threshold)  - Would consider LP given lack of infectious source  - Seizure & Fall precautions  - Ativan 2mg IVP for seizures > 2mins  - Keep Mg>2 and K >4.   - Management per primary team.     Please call us if any questions or neurological changes.

## 2023-07-18 NOTE — CONSULT NOTE ADULT - SUBJECTIVE AND OBJECTIVE BOX
HPI:  Osiel Norwood is a 69YO male with a past medical history of type 2 diabetes, hyperlipidemia, iron deficiency anemia, tracheal stenosis s/p tracheostomy (which was closed by ENT 7 days ago); glioblastoma s/p resection 1/27/2022, and Avastin treatment 3/2023 presenting with altered mental status and weakness x2 days. Per family, they last saw him last night around 6pm when he was at baseline. Patient has intermittent expressive aphasia but is getting worse and now is persistent, and has intermittent nausea and vomiting since last night. Patient was supposed to receive an outpatient brain MRI on 7/25/2023.  Stroke code performed in  is negative for CVA. Head CT shows new hyperdensity in frontal parietal - surgical site. MRI is ordered to rule out tumor recurrence.   Per daughter at bedside, Entresto and Eliquis were stopped per his PC - cardiologist.    (07 Jul 2023 13:10)    Hospital course, primary team, and consultant notes reviewed. Pt seen and examined. Confused, on eeg, not following commands. +restraints.  Mildly anxious, otherwise appears objectively comfortable. Son in lawWood, at bedside. Per Wood: Prior to admission, pt lived with roommate and required some assistance from roomate/ family with medication management and monitoring pts safety awareness, pt was reportedly able to shower himself, perform ADLs with minimal assistance. aox4 and ambulatory, no distressing symptoms.     Comprehensive ROS as noted below, collateral obtained from chart/team/family. Exploration of GOC documented as below.    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   No Rx found on iStop review (Ref#:     [id number]    A	N		06/28/2023	06/28/2023	luxlyte awake gel capsule 4.75mg thc and 0.25mg cbd/capsule	1	3	Roseann Noel)	[id number]	Cash	[facility]      Items that are not checked are not present  PSYCHOSOCIAL ASSESSMENT:  - Significant other/partner: [  ]  Children: [ x ]  - Living Situation: Home [ x ] Long term care [  ]  Rehab[  ]  Other[  ]  - Support system: strong[  ] adequate[  x] inadequate[  ]  - Mormonism/Spiritual practice:  - Role of organized Sikh important [  ] some [  ] unable to assess dt pt mentation [ x ]  - Coping: well[  ]  with difficulty[  ]  poor coping[  ]  unable to assess dt pt mentation [ x ]      ADVANCE DIRECTIVES:    - MOLST[  ]    Living Will [  ]   DECISION MAKER(s):  - Health Care Proxy(s) [  ]  Surrogate(s)[  ] Guardian[  ]           Name(s)/Phone Number(s):       BASELINE (I)ADLs (prior to admission):  - [place]:  Total[  ] Moderate[ x ] Dependent[  ]    Allergies    amoxicillin (Rash)    Intolerances        Medications:      MEDICATIONS  (STANDING):  atorvastatin 20 milliGRAM(s) Oral at bedtime  dexAMETHasone  Injectable 4 milliGRAM(s) IV Push every 12 hours  escitalopram 5 milliGRAM(s) Oral daily  fludroCORTISONE 0.2 milliGRAM(s) Oral daily  fluticasone propionate 50 MICROgram(s)/spray Nasal Spray 1 Spray(s) Both Nostrils two times a day  hydrocortisone 1% Cream 1 Application(s) Topical daily  insulin lispro (ADMELOG) corrective regimen sliding scale   SubCutaneous Before meals and at bedtime  levETIRAcetam  IVPB 1000 milliGRAM(s) IV Intermittent every 12 hours  melatonin 5 milliGRAM(s) Oral at bedtime  meropenem  IVPB 2000 milliGRAM(s) IV Intermittent every 8 hours  potassium chloride   Powder 40 milliEquivalent(s) Oral every 6 hours  potassium phosphate IVPB 30 milliMole(s) IV Intermittent once  senna 2 Tablet(s) Oral at bedtime  sodium chloride 3 Gram(s) Oral every 6 hours  sucralfate 1 Gram(s) Oral every 12 hours  vancomycin  IVPB 1250 milliGRAM(s) IV Intermittent every 12 hours    MEDICATIONS  (PRN):  acetaminophen     Tablet .. 650 milliGRAM(s) Oral every 6 hours PRN Temp greater or equal to 38C (100.4F), Mild Pain (1 - 3)  benzocaine/menthol Lozenge 1 Lozenge Oral every 4 hours PRN Sore Throat  ondansetron Injectable 4 milliGRAM(s) IV Push every 6 hours PRN Nausea and/or Vomiting      PRESENT SYMPTOMS:   [x ]Unable to obtain due to poor mentation   Source if other than patient:  [x ]Family   [ ]Team     Pain [  ]    Analgesic Use (PRNs) for past 24 hours:  - none     If [  ], pt denies symptom.   Dyspnea:         [  ]  Anxiety:           [x  ] restless on restraints while on eeg  Difficulty sleeping: [  ]  Fatigue:           [  ]  Nausea:           [  ]  Loss of appetite:     [  ]  Dysphagia: [  ]  Constipation:   [  ]        Other Symptoms:    All other review of systems negative [ x ]    ECOG Performance:       Current Palliative Performance Scale/Karnofsky Score:    %  Preadmit Karnofsky:   %          PEx:  General:   alert  oriented x       lethargic, distressed, cachexia,  nonverbal,  unarousable, verbal  Behavioral: Anxiety  Delirium Agitation Cooperative  HEENT: atraumatic,  No temporal wasting,  No dry mouth,  ET tube/trach  RESP: Reg rhythm, No  tachypnea/labored breathing,  No audible excessive secretions,  CTAB, diminished bilat bases  CV: RRR, S1S2,  No  tachycardia  GI: soft non distended non tender  incontinent               PEG/NG/OG tube                 constipation  last BM:   : normal  incontinent  oliguria/anuria  gonzalez  MUSK: weakness x4,  edema, ambulatory with assistance,   mostly/fully  BB/WC bound  SKIN: No abnormal skin lesions, Poor skin turgor, Pressure ulcer stage:   NEURO:  No deficits, cognitive impairment, encephalopathic dsyphagia dysarthria paresis  Oral intake ability: unable/only mouth care, minimal moderate full capability      T(C): 36.7 (07-19-23 @ 09:00), Max: 36.9 (07-18-23 @ 21:50)  HR: 98 (07-19-23 @ 11:34) (90 - 118)  BP: 153/83 (07-19-23 @ 11:34) (131/70 - 153/83)  RR: 17 (07-19-23 @ 11:34) (17 - 18)  SpO2: 96% (07-19-23 @ 11:34) (95% - 98%)  Wt(kg): --    Labs:    CBC:                        10.5   15.97 )-----------( 78       ( 19 Jul 2023 05:30 )             32.0     CMP:    07-19    130<L>  |  94<L>  |  11  ----------------------------<  147<H>  3.3<L>   |  24  |  0.52    Ca    8.0<L>      19 Jul 2023 05:30  Phos  1.9     07-19  Mg     1.8     07-19    TPro  6.5  /  Alb  3.8  /  TBili  1.4<H>  /  DBili  0.3  /  AST  25  /  ALT  75<H>  /  AlkPhos  64  07-17    PT/INR - ( 17 Jul 2023 14:34 )   PT: 12.8 sec;   INR: 1.07          PTT - ( 17 Jul 2023 14:34 )  PTT:21.0 sec   Urinalysis Basic - ( 19 Jul 2023 05:30 )    Color: x / Appearance: x / SG: x / pH: x  Gluc: 147 mg/dL / Ketone: x  / Bili: x / Urobili: x   Blood: x / Protein: x / Nitrite: x   Leuk Esterase: x / RBC: x / WBC x   Sq Epi: x / Non Sq Epi: x / Bacteria: x        RADIOLOGY & ADDITIONAL STUDIES:  Imaging:  Reviewed  < from: CT Brain Stroke Protocol (07.17.23 @ 15:13) >  IMPRESSION:  CT HEAD: Status post left frontal craniotomy for tumor resection. Stable   hyperdense nodule at the inferior medial margin of theresection cavity   in the left inferior frontal lobe. Extensive surrounding hypodensity in   the left frontal lobe, left basal ganglia, and right inferior frontal   lobe.      < from: CT Angio Head w/ IV Cont (07.17.23 @ 15:27) >  CT HEAD:  Patient is status post left frontal craniotomy for tumor resection.  There is stable hyperdense nodule at the inferior medial margin of the   resection cavity in the left inferior frontal lobe. There is hypodensity   which surrounds the resection cavity and also extends to involve the left   superior frontal lobe, left basal ganglia, and right inferior frontal   lobe. There is a small extra axial fluid along the left frontal   convexity, unchanged.  There is no evidence of acute infarction, intracranial hemorrhage or   hydrocephalus.  There is hypoattenuation of the subcortical and periventricular white   matter, which is nonspecific finding, but most likely represents sequela   of chronic microvascular ischemic disease. There are atherosclerotic   calcifications of the cavernous internal carotid arteries bilaterally.   There is prominence of the cortical sulci related to underlying brain   parenchymal volume loss, which appears most pronounced in the mesial   temporal lobes.        [facility] discussion:  HPI:  Osiel Norwood is a 67YO male with a past medical history of type 2 diabetes, hyperlipidemia, iron deficiency anemia, tracheal stenosis s/p tracheostomy (which was closed by ENT 7 days ago); glioblastoma s/p resection 1/27/2022, and Avastin treatment 3/2023 presenting with altered mental status and weakness x2 days. Per family, they last saw him last night around 6pm when he was at baseline. Patient has intermittent expressive aphasia but is getting worse and now is persistent, and has intermittent nausea and vomiting since last night. Patient was supposed to receive an outpatient brain MRI on 7/25/2023.  Stroke code performed in  is negative for CVA. Head CT shows new hyperdensity in frontal parietal - surgical site. MRI is ordered to rule out tumor recurrence.   Per daughter at bedside, Entresto and Eliquis were stopped per his PC - cardiologist.    (07 Jul 2023 13:10)    Hospital course, primary team, and consultant notes reviewed. Pt seen and examined. Confused, on eeg, not following commands. +restraints.  Mildly anxious, otherwise appears objectively comfortable. Son in lawWood, at bedside. Per Wood: Prior to admission, pt lived with roommate and required some assistance from room mate/ family with medication management and monitoring pts safety awareness, pt was reportedly able to shower himself, perform ADLs with minimal assistance. aox4 and ambulatory, no distressing symptoms.     Comprehensive ROS as noted below, collateral obtained from chart/team/family. Exploration of GOC documented as below.    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   No Rx found on iStop review (Ref#:     [id number]    A	N		06/28/2023	06/28/2023	luxlyte awake gel capsule 4.75mg thc and 0.25mg cbd/capsule	1	3	Roseann Noel)	[id number]	Cash	[facility] [place]      Items that are not checked are not present  PSYCHOSOCIAL ASSESSMENT:  - Significant other/partner: [  ]  Children: [ x ]  - Living Situation: Home [ x ] Long term care [  ]  Rehab[  ]  Other[  ]  - Support system: strong[  ] adequate[  x] inadequate[  ]  - Sabianism/Spiritual practice:  - Role of organized Hoahaoism important [  ] some [  ] unable to assess dt pt mentation [ x ]  - Coping: well[  ]  with difficulty[  ]  poor coping[  ]  unable to assess dt pt mentation [ x ]    ADVANCE DIRECTIVES:    - MOLST[  ]    Living Will [  ]   DECISION MAKER(s):  - Health Care Proxy(s) [  ]  Surrogate(s)[  ] Guardian[  ]           Name(s)/Phone Number(s):       BASELINE (I)ADLs (prior to admission):  - [place]:  Total[  ] Moderate[ x ] Dependent[  ]    Allergies  amoxicillin (Rash)  Intolerances    Medications:      MEDICATIONS  (STANDING):  atorvastatin 20 milliGRAM(s) Oral at bedtime  dexAMETHasone  Injectable 4 milliGRAM(s) IV Push every 12 hours  escitalopram 5 milliGRAM(s) Oral daily  fludroCORTISONE 0.2 milliGRAM(s) Oral daily  fluticasone propionate 50 MICROgram(s)/spray Nasal Spray 1 Spray(s) Both Nostrils two times a day  hydrocortisone 1% Cream 1 Application(s) Topical daily  insulin lispro (ADMELOG) corrective regimen sliding scale   SubCutaneous Before meals and at bedtime  levETIRAcetam  IVPB 1000 milliGRAM(s) IV Intermittent every 12 hours  melatonin 5 milliGRAM(s) Oral at bedtime  meropenem  IVPB 2000 milliGRAM(s) IV Intermittent every 8 hours  potassium chloride   Powder 40 milliEquivalent(s) Oral every 6 hours  potassium phosphate IVPB 30 milliMole(s) IV Intermittent once  senna 2 Tablet(s) Oral at bedtime  sodium chloride 3 Gram(s) Oral every 6 hours  sucralfate 1 Gram(s) Oral every 12 hours  vancomycin  IVPB 1250 milliGRAM(s) IV Intermittent every 12 hours    MEDICATIONS  (PRN):  acetaminophen     Tablet .. 650 milliGRAM(s) Oral every 6 hours PRN Temp greater or equal to 38C (100.4F), Mild Pain (1 - 3)  benzocaine/menthol Lozenge 1 Lozenge Oral every 4 hours PRN Sore Throat  ondansetron Injectable 4 milliGRAM(s) IV Push every 6 hours PRN Nausea and/or Vomiting    PRESENT SYMPTOMS:   [x ]Unable to obtain due to poor mentation   Source if other than patient:  [x ]Family   [ ]Team     Pain [  ]    Analgesic Use (PRNs) for past 24 hours:  - none     If [  ], pt denies symptom.   Dyspnea:         [  ]  Anxiety:           [x  ] restless on restraints while on eeg  Difficulty sleeping: [  ]  Fatigue:           [  ]  Nausea:           [  ]  Loss of appetite:     [  ]  Dysphagia: [  ]  Constipation:   [  ]        Other Symptoms:    All other review of systems negative [ x ]    ECOG Performance:       Current Palliative Performance Scale/Karnofsky Score:    %  Preadmit Karnofsky:   %          PEx:  General:   alert  oriented x       lethargic, distressed, cachexia,  nonverbal,  unarousable, verbal  Behavioral: Anxiety  Delirium Agitation Cooperative  HEENT: atraumatic,  No temporal wasting,  No dry mouth,  ET tube/trach  RESP: Reg rhythm, No  tachypnea/labored breathing,  No audible excessive secretions,  CTAB, diminished bilat bases  CV: RRR, S1S2,  No  tachycardia  GI: soft non distended non tender  incontinent               PEG/NG/OG tube                 constipation  last BM:   : normal  incontinent  oliguria/anuria  gonzalez  MUSK: weakness x4,  edema, ambulatory with assistance,   mostly/fully  BB/WC bound  SKIN: No abnormal skin lesions, Poor skin turgor, Pressure ulcer stage:   NEURO:  No deficits, cognitive impairment, encephalopathic dsyphagia dysarthria paresis  Oral intake ability: unable/only mouth care, minimal moderate full capability      T(C): 36.7 (07-19-23 @ 09:00), Max: 36.9 (07-18-23 @ 21:50)  HR: 98 (07-19-23 @ 11:34) (90 - 118)  BP: 153/83 (07-19-23 @ 11:34) (131/70 - 153/83)  RR: 17 (07-19-23 @ 11:34) (17 - 18)  SpO2: 96% (07-19-23 @ 11:34) (95% - 98%)  Wt(kg): --    Labs:    reviewed        RADIOLOGY & ADDITIONAL STUDIES:  Imaging:  Reviewed  < from: CT Brain Stroke Protocol (07.17.23 @ 15:13) >  IMPRESSION:  CT HEAD: Status post left frontal craniotomy for tumor resection. Stable   hyperdense nodule at the inferior medial margin of theresection cavity   in the left inferior frontal lobe. Extensive surrounding hypodensity in   the left frontal lobe, left basal ganglia, and right inferior frontal   lobe.      < from: CT Angio Head w/ IV Cont (07.17.23 @ 15:27) >  CT HEAD:  Patient is status post left frontal craniotomy for tumor resection.  There is stable hyperdense nodule at the inferior medial margin of the   resection cavity in the left inferior frontal lobe. There is hypodensity   which surrounds the resection cavity and also extends to involve the left   superior frontal lobe, left basal ganglia, and right inferior frontal   lobe. There is a small extra axial fluid along the left frontal   convexity, unchanged.  There is no evidence of acute infarction, intracranial hemorrhage or   hydrocephalus.  There is hypoattenuation of the subcortical and periventricular white   matter, which is nonspecific finding, but most likely represents sequela   of chronic microvascular ischemic disease. There are atherosclerotic   calcifications of the cavernous internal carotid arteries bilaterally.   There is prominence of the cortical sulci related to underlying brain   parenchymal volume loss, which appears most pronounced in the mesial   temporal lobes.        [facility] discussion:  HPI:  Osiel Norwood is a 67YO male with a past medical history of type 2 diabetes, hyperlipidemia, iron deficiency anemia, tracheal stenosis s/p tracheostomy (which was closed by ENT 7 days ago); glioblastoma s/p resection 1/27/2022, and Avastin treatment 3/2023 presenting with altered mental status and weakness x2 days. Per family, they last saw him last night around 6pm when he was at baseline. Patient has intermittent expressive aphasia but is getting worse and now is persistent, and has intermittent nausea and vomiting since last night. Patient was supposed to receive an outpatient brain MRI on 7/25/2023.  Stroke code performed in  is negative for CVA. Head CT shows new hyperdensity in frontal parietal - surgical site. MRI is ordered to rule out tumor recurrence.   Per daughter at bedside, Entresto and Eliquis were stopped per his PC - cardiologist.    (07 Jul 2023 13:10)    Hospital course, primary team, and consultant notes reviewed. Pt seen and examined. Confused, on eeg, not following commands. +restraints.  Mildly anxious, otherwise appears objectively comfortable. Son in lawWood, at bedside. Per Wood: Prior to admission, pt lived with roommate and required some assistance from room mate/ family with medication management and monitoring pts safety awareness, pt was reportedly able to shower himself, perform ADLs with minimal assistance. aox4 and ambulatory, no distressing symptoms.     Comprehensive ROS as noted below, collateral obtained from chart/team/family. Exploration of GOC documented as below.    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   No Rx found on iStop review (Ref#:     [id number]    A	N		06/28/2023	06/28/2023	luxlyte awake gel capsule 4.75mg thc and 0.25mg cbd/capsule	1	3	Roseann Noel)	[id number]	Cash	[facility] [place]      Items that are not checked are not present  PSYCHOSOCIAL ASSESSMENT:  - Significant other/partner: [  ]  Children: [ x ]  - Living Situation: Home [ x ] Long term care [  ]  Rehab[  ]  Other[  ]  - Support system: strong[  ] adequate[  x] inadequate[  ]  - Shinto/Spiritual practice:  - Role of organized Mu-ism important [  ] some [  ] unable to assess dt pt mentation [ x ]  - Coping: well[  ]  with difficulty[  ]  poor coping[  ]  unable to assess dt pt mentation [ x ]    ADVANCE DIRECTIVES:    - MOLST[  ]    Living Will [  ]   DECISION MAKER(s):  - Health Care Proxy(s) [  ]  Surrogate(s)[  ] Guardian[  ]           Name(s)/Phone Number(s):     BASELINE (I)ADLs (prior to admission):  - [place]:  Total[  ] Moderate[ x ] Dependent[  ]    Allergies  amoxicillin (Rash)  Intolerances    Medications:      MEDICATIONS  (STANDING):  atorvastatin 20 milliGRAM(s) Oral at bedtime  dexAMETHasone  Injectable 4 milliGRAM(s) IV Push every 12 hours  escitalopram 5 milliGRAM(s) Oral daily  fludroCORTISONE 0.2 milliGRAM(s) Oral daily  fluticasone propionate 50 MICROgram(s)/spray Nasal Spray 1 Spray(s) Both Nostrils two times a day  hydrocortisone 1% Cream 1 Application(s) Topical daily  insulin lispro (ADMELOG) corrective regimen sliding scale   SubCutaneous Before meals and at bedtime  levETIRAcetam  IVPB 1000 milliGRAM(s) IV Intermittent every 12 hours  melatonin 5 milliGRAM(s) Oral at bedtime  meropenem  IVPB 2000 milliGRAM(s) IV Intermittent every 8 hours  potassium chloride   Powder 40 milliEquivalent(s) Oral every 6 hours  potassium phosphate IVPB 30 milliMole(s) IV Intermittent once  senna 2 Tablet(s) Oral at bedtime  sodium chloride 3 Gram(s) Oral every 6 hours  sucralfate 1 Gram(s) Oral every 12 hours  vancomycin  IVPB 1250 milliGRAM(s) IV Intermittent every 12 hours    MEDICATIONS  (PRN):  acetaminophen     Tablet .. 650 milliGRAM(s) Oral every 6 hours PRN Temp greater or equal to 38C (100.4F), Mild Pain (1 - 3)  benzocaine/menthol Lozenge 1 Lozenge Oral every 4 hours PRN Sore Throat  ondansetron Injectable 4 milliGRAM(s) IV Push every 6 hours PRN Nausea and/or Vomiting    PRESENT SYMPTOMS:   [x ]Unable to obtain due to poor mentation   Source if other than patient:  [x ]Family   [ ]Team     Pain [  ]  Analgesic Use (PRNs) for past 24 hours:  - none     If [  ], pt denies symptom.   Dyspnea:         [  ]  Anxiety:           [x  ] restless on restraints while on eeg  Difficulty sleeping: [  ]  Fatigue:           [  ]  Nausea:           [  ]  Loss of appetite:     [  ]  Dysphagia: [  ]  Constipation:   [  ]        Other Symptoms:    All other review of systems negative [ x ]    ECOG Performance:       Current Palliative Performance Scale/Karnofsky Score:    %  Preadmit Karnofsky:   %          PEx:  General:   alert  oriented x       lethargic, distressed, cachexia,  nonverbal,  unarousable, verbal  Behavioral: Anxiety  Delirium Agitation Cooperative  HEENT: atraumatic,  No temporal wasting,  No dry mouth,  ET tube/trach  RESP: Reg rhythm, No  tachypnea/labored breathing,  No audible excessive secretions,  CTAB, diminished bilat bases  CV: RRR, S1S2,  No  tachycardia  GI: soft non distended non tender  incontinent               PEG/NG/OG tube                 constipation  last BM:   : normal  incontinent  oliguria/anuria  gonzalez  MUSK: weakness x4,  edema, ambulatory with assistance,   mostly/fully  BB/WC bound  SKIN: No abnormal skin lesions, Poor skin turgor, Pressure ulcer stage:   NEURO:  No deficits, cognitive impairment, encephalopathic dsyphagia dysarthria paresis  Oral intake ability: unable/only mouth care, minimal moderate full capability      T(C): 36.7 (07-19-23 @ 09:00), Max: 36.9 (07-18-23 @ 21:50)  HR: 98 (07-19-23 @ 11:34) (90 - 118)  BP: 153/83 (07-19-23 @ 11:34) (131/70 - 153/83)  RR: 17 (07-19-23 @ 11:34) (17 - 18)  SpO2: 96% (07-19-23 @ 11:34) (95% - 98%)  Wt(kg): --    Labs:    reviewed        RADIOLOGY & ADDITIONAL STUDIES:  Imaging:  Reviewed  < from: CT Brain Stroke Protocol (07.17.23 @ 15:13) >  IMPRESSION:  CT HEAD: Status post left frontal craniotomy for tumor resection. Stable   hyperdense nodule at the inferior medial margin of theresection cavity   in the left inferior frontal lobe. Extensive surrounding hypodensity in   the left frontal lobe, left basal ganglia, and right inferior frontal   lobe.      < from: CT Angio Head w/ IV Cont (07.17.23 @ 15:27) >  CT HEAD:  Patient is status post left frontal craniotomy for tumor resection.  There is stable hyperdense nodule at the inferior medial margin of the   resection cavity in the left inferior frontal lobe. There is hypodensity   which surrounds the resection cavity and also extends to involve the left   superior frontal lobe, left basal ganglia, and right inferior frontal   lobe. There is a small extra axial fluid along the left frontal   convexity, unchanged.  There is no evidence of acute infarction, intracranial hemorrhage or   hydrocephalus.  There is hypoattenuation of the subcortical and periventricular white   matter, which is nonspecific finding, but most likely represents sequela   of chronic microvascular ischemic disease. There are atherosclerotic   calcifications of the cavernous internal carotid arteries bilaterally.   There is prominence of the cortical sulci related to underlying brain   parenchymal volume loss, which appears most pronounced in the mesial   temporal lobes.        [facility] discussion:

## 2023-07-19 NOTE — SWALLOW BEDSIDE ASSESSMENT ADULT - SWALLOW EVAL: DIAGNOSIS
Functional oral phase. Seemingly functional pharyngeal swallow. No clinical indicators of penetration/aspiration. No gross clinical indicators of pharyngeal inefficiency. No tameka dysarthria noted in conversation. Moderate to severe dysphonia noted characterized by rough and breathy vocal quality c/w known tracheal stenosis. Hesitations noted, though communicated basic wants/needs. Presentation consistent with GBM, left hemispheric involvement. This service to f/u to assess tolerance of recommended diet.

## 2023-07-19 NOTE — SWALLOW BEDSIDE ASSESSMENT ADULT - SLP PERTINENT HISTORY OF CURRENT PROBLEM
PMHx of type 2 diabetes, hyperlipidemia, iron deficiency anemia, tracheal stenosis s/p tracheostomy (which was closed by ENT 7 days ago); glioblastoma s/p resection 1/27/2022, and Avastin treatment 3/2023 who presented to [facility] on 7/7/23 with expressive aphasia and vomiting x 1 day.

## 2023-07-19 NOTE — SWALLOW BEDSIDE ASSESSMENT ADULT - COMMENTS
CT showed new hyperdensity in left frontal parietal - surgical site c/f disease progression. Of note, pt decannulated self. Dr. Caba on 6/27 attempted to dilate stoma, but determined it had already closed. Decision was made with family at that time to leave patient decannulated as pt had no breathing issues and to repeat imaging to assess stenosis. Repeat CT neck performed 7/1 redemonstrated tracheal stenosis just above thoracic inlet around T1, noted to be improved compared to prior CT from 2022. Narrowest tracheal lumen 9x8mm. Flexible laryngoscopy completed on 7/16/23 revealed stable compared to prior on 7/8, with mod subglottic narrowing and R > L slight true vocal cord limitation with abduction, mild mid true VC glottic gap. Pt was pending d/c 7/17/23, however, developed worsening respiratory status and AMS. EEG 7/19/23 last- 1) Mild to moderate generalized   slowing suggestive of a mild to moderate degree of diffuse or multifocal  cerebral dysfunction.  2) Focal slowing over left hemisphere, more prominent in frontotemporal region suggestive of focal cerebral dysfunction  3) Occasional to frequent sharply contoured waves over left frontotemporal region, variable max, indicating underlying hyperexcitability.     Improved mentation today.

## 2023-07-19 NOTE — PROGRESS NOTE ADULT - SUBJECTIVE AND OBJECTIVE BOX
HPI:  Osiel Norwood is a 67YO male with a past medical history of type 2 diabetes, hyperlipidemia, iron deficiency anemia, tracheal stenosis s/p tracheostomy (which was closed by ENT 7 days ago); glioblastoma s/p resection 1/27/2022, and Avastin treatment 3/2023 presenting with altered mental status and weakness x2 days. Per family, they last saw him last night around 6pm when he was at baseline. Patient has intermittent expressive aphasia but is getting worse and now is persistent, and has intermittent nausea and vomiting since last night. Patient was supposed to receive an outpatient brain MRI on 7/25/2023.  Stroke code performed in  is negative for CVA. Head CT shows new hyperdensity in frontal parietal - surgical site. MRI is ordered to rule out tumor recurrence.   Per daughter at bedside, Entresto and Eliquis were stopped per his PC - cardiologist.     HOSPITAL COURSE:         OVERNIGHT EVENTS:  Vital Signs Last 24 Hrs  T(C): 36.9 (18 Jul 2023 21:50), Max: 36.9 (18 Jul 2023 21:50)  T(F): 98.4 (18 Jul 2023 21:50), Max: 98.4 (18 Jul 2023 21:50)  HR: 90 (19 Jul 2023 00:30) (90 - 120)  BP: 131/70 (19 Jul 2023 00:30) (131/70 - 149/95)  BP(mean): 94 (19 Jul 2023 00:30) (94 - 122)  RR: 18 (19 Jul 2023 00:30) (17 - 18)  SpO2: 96% (19 Jul 2023 00:30) (95% - 98%)    Parameters below as of 19 Jul 2023 00:30  Patient On (Oxygen Delivery Method): nasal cannula  O2 Flow (L/min): 2      I&O's Summary    17 Jul 2023 07:01  -  18 Jul 2023 07:00  --------------------------------------------------------  IN: 2650 mL / OUT: 1925 mL / NET: 725 mL    18 Jul 2023 07:01  -  19 Jul 2023 03:57  --------------------------------------------------------  IN: 1270 mL / OUT: 875 mL / NET: 395 mL        PHYSICAL EXAM:  Neurological:    Motor exam:         [] Upper extremity              Bi(c5)  WE(c6)  EE(c7)   FF(c8)                                                R         5/5        5/5        5/5       5/5                                               L          5/5        5/5        5/5       5/5         [] Lower extremeity          HF(l2)   KE(l3)    TA(l4)   EHL(l5)  GS(s1)                                                 R        5/5        5/5        5/5       5/5         5/5                                               L         5/5        5/5       5/5       5/5          5/5                                                        [] warm well perfused; capillary refill <3 seconds     Sensation: [] intact to light touch  [] decreased:       Cardiovascular:  Respiratory:  Gastrointestinal:  Genitourinary:  Extremities:  Incision/Wound:    TUBES/LINES:  [] Campuzano  [] Lumbar Drain  [] Wound Drains  [] Others      DIET:  [] NPO  [] Mechanical  [] Tube feeds    LABS:                        12.2   28.59 )-----------( 84       ( 18 Jul 2023 05:30 )             35.6     07-18    133<L>  |  92<L>  |  13  ----------------------------<  197<H>  3.7   |  27  |  0.52    Ca    8.8      18 Jul 2023 05:30  Phos  3.7     07-18  Mg     2.0     07-18    TPro  6.5  /  Alb  3.8  /  TBili  1.4<H>  /  DBili  0.3  /  AST  25  /  ALT  75<H>  /  AlkPhos  64  07-17    PT/INR - ( 17 Jul 2023 14:34 )   PT: 12.8 sec;   INR: 1.07          PTT - ( 17 Jul 2023 14:34 )  PTT:21.0 sec  Urinalysis Basic - ( 18 Jul 2023 05:30 )    Color: x / Appearance: x / SG: x / pH: x  Gluc: 197 mg/dL / Ketone: x  / Bili: x / Urobili: x   Blood: x / Protein: x / Nitrite: x   Leuk Esterase: x / RBC: x / WBC x   Sq Epi: x / Non Sq Epi: x / Bacteria: x      CARDIAC MARKERS ( 17 Jul 2023 14:34 )  x     / x     / 36 U/L / x     / <1.0 ng/mL      CAPILLARY BLOOD GLUCOSE      POCT Blood Glucose.: 116 mg/dL (18 Jul 2023 22:27)  POCT Blood Glucose.: 173 mg/dL (18 Jul 2023 17:53)  POCT Blood Glucose.: 134 mg/dL (18 Jul 2023 11:24)  POCT Blood Glucose.: 221 mg/dL (18 Jul 2023 06:29)      Drug Levels: [] N/A    CSF Analysis: [] N/A      Allergies    amoxicillin (Rash)    Intolerances      MEDICATIONS:  Antibiotics:  meropenem  IVPB 2000 milliGRAM(s) IV Intermittent every 8 hours  vancomycin  IVPB 1000 milliGRAM(s) IV Intermittent every 12 hours    Neuro:  acetaminophen     Tablet .. 650 milliGRAM(s) Oral every 6 hours PRN  escitalopram 5 milliGRAM(s) Oral daily  levETIRAcetam  IVPB 1000 milliGRAM(s) IV Intermittent every 12 hours  melatonin 5 milliGRAM(s) Oral at bedtime  ondansetron Injectable 4 milliGRAM(s) IV Push every 6 hours PRN    Anticoagulation:    OTHER:  atorvastatin 20 milliGRAM(s) Oral at bedtime  benzocaine/menthol Lozenge 1 Lozenge Oral every 4 hours PRN  dexAMETHasone  Injectable 4 milliGRAM(s) IV Push every 12 hours  fludroCORTISONE 0.2 milliGRAM(s) Oral daily  fluticasone propionate 50 MICROgram(s)/spray Nasal Spray 1 Spray(s) Both Nostrils two times a day  hydrocortisone 1% Cream 1 Application(s) Topical daily  insulin lispro (ADMELOG) corrective regimen sliding scale   SubCutaneous Before meals and at bedtime  senna 2 Tablet(s) Oral at bedtime  sucralfate 1 Gram(s) Oral every 12 hours    IVF:  sodium chloride 3 Gram(s) Oral every 6 hours  sodium chloride 0.9%. 1000 milliLiter(s) IV Continuous <Continuous>    CULTURES:  Culture Results:   Culture in progress (07-17 @ 18:48)  Culture Results:   Culture in progress (07-17 @ 18:48)    RADIOLOGY & ADDITIONAL TESTS:      ASSESSMENT:  68y Male s/p    ALTERED MENTAL STATUS;BRAIN MASS    Abnormal electrocardiogram [ECG] [EKG]    Allergy, unspecified, initial encounter    Anemia, unspecified    Benign prostatic hyperplasia without lower urinary tract symptoms    Calculus of kidney    Cough, unspecified    Depression, unspecified    ENCOUNTER FOR ATTENT    Encounter for general adult medical examination without abnormal findings    Encounter for immunization    Encounter for immunization safety counseling    Encounter for other preprocedural examination    Encounter for screening for malignant neoplasm of prostate    Encounter for screening for other metabolic disorders    Encounter for screening for other viral diseases    ESSENTIAL (PRIMARY)    Gastro-esophageal reflux disease without esophagitis    Generalized anxiety disorder    History of Glioblastoma    Hyperlipidemia, unspecified    Hypo-osmolality and hyponatremia    Hypotension, unspecified    Iron deficiency    Iron deficiency anemia, unspecified    LONG TERM (CURRENT)    LONG TERM (CURRENT)    LONG TERM (CURRENT)    Malignant neoplasm of brain, unspecified    Nausea with vomiting, unspecified    Other constipation    Other fatigue    Other injury of unspecified body region, initial encounter    OTHER SPECIFIED DISE    Personal history of malignant neoplasm, unspecified    Personal history of other diseases of the nervous system and sense organs    Personal history of other endocrine, nutritional and metabolic disease    Personal history of other mental and behavioral disorders    Personal history of transient ischemic attack (TIA), and cerebral infarction without residual deficits    POSTPROCEDURAL SUBGL    Pruritus, unspecified    Shortness of breath    Tachycardia, unspecified    Thrombocytopenia, unspecified    Unspecified abdominal pain    Unspecified visual disturbance    Urinary calculus, unspecified    Vitamin D deficiency, unspecified    No pertinent family history in first degree relatives    FH: diabetes mellitus (Father, Mother)    FH: hyperlipidemia (Father)    FH: hypertension (Father, Mother)    Handoff    MEWS Score    No pertinent past medical history    Diabetes mellitus    Hypertension    Glioblastoma    Type 2 diabetes mellitus    Hyperlipidemia    Iron deficiency anemia    Nephrolithiasis    Thrombocytopenia    Hyponatremia    BPH (benign prostatic hyperplasia)    No pertinent past medical history    Altered mental status    Hyponatremia    Hypertension    Glioblastoma    Type 2 diabetes mellitus    Hyperlipidemia    Iron deficiency anemia    Thrombocytopenia    BPH (benign prostatic hyperplasia)    Hyponatremia    No significant past surgical history    No significant past surgical history    S/P craniotomy    H/O tracheostomy    No significant past surgical history    MED EVAL    Type 2 diabetes mellitus    BPH (benign prostatic hyperplasia)    90+    Room Service Assist    Brain mass    Hypertension    Glioblastoma    Type 2 diabetes mellitus    Hyperlipidemia    Iron deficiency anemia    Thrombocytopenia    BPH (benign prostatic hyperplasia)    Tracheal stenosis    [email or web address]        PLAN:  NEURO:    CARDIOVASCULAR:    PULMONARY:    RENAL:    GI:    HEME:    ID:    ENDO:    DVT PROPHYLAXIS:  [] Venodynes                                [] Heparin/Lovenox    DISPOSITION: HPI:  Osiel Norwood is a 67YO male with a past medical history of type 2 diabetes, hyperlipidemia, iron deficiency anemia, tracheal stenosis s/p tracheostomy (which was closed by ENT 7 days ago); glioblastoma s/p resection 1/27/2022, and Avastin treatment 3/2023 presenting with altered mental status and weakness x2 days. Per family, they last saw him last night around 6pm when he was at baseline. Patient has intermittent expressive aphasia but is getting worse and now is persistent, and has intermittent nausea and vomiting since last night. Patient was supposed to receive an outpatient brain MRI on 7/25/2023.  Stroke code performed in  is negative for CVA. Head CT shows new hyperdensity in frontal parietal - surgical site. MRI is ordered to rule out tumor recurrence.   Per daughter at bedside, Entresto and Eliquis were stopped per his PC - cardiologist.     HOSPITAL COURSE:   7/7: Admitted. Na 115 in ED with repeat 118, started 3% at 30cc/hr, and salt tabs 3q6, stability CTH in ED showing Interval development of a 13 mm hyperdense nodule along the   inferior margin of the resection cavity which may represent progression   of disease with hemorrhage, repeat CTH stable, urine studies ordered  7/8: Neuro stable, 12AM BMP Na114 3% increased to 40cc/hr, urine studies, pancx to r/o infection since pt is immunocompromised. Changed pantoprazole to sucralfate for GI ppx d/t thrombocytopenia. LE dopplers negative. DC'd 3%. ENT consulted to assess stoma, recommend follow up upon discharge with ENT, Repeat sodium 122. Restarted 3% at 30.  7/9: ANA LILIA o/n neuro stable. remains on 3%@30cc/hr. Started florinef, increased 3% to 50cc/hr. Increased 3% to 75cc/hr.    7/10: continued current 3% rate o/n. Na 127 --> 125, cont 3% @75cc/hr, f/u repeat Na this evening, likely stepdown tomorrow. Pend dispo home with home PT/OT when able. Heme consulted for thrombocytopenia  7/11: ANA LILIA o/n. Remains on 3% @75cc/hr. Decreased 3% to 50cc/hr. Started 1.2L fluid restriction. Repeat Na corrected 128  7/12: ANA LILIA overnight, remains on 3%, SDU from ICU  7/13: ANA LILIA overnight, neuro stable, on 3%@50, Am sodium 137, decreased 3% to 25cc/hr, repeat Na 138, decreased to 15cc/hr.  7/14: ANA LILIA overnight, neuro stable, remains on 3%@15cc/hr  7/15: ANA LILIA overnight. Neuro exam stable. Pt remains on 3% saline.Sodium 134 this AM remains on 3%@25. Per nephrology recs hypertonic Na d'ceed, Na tabs 2q6, URENA 15g BID, cont florinef/ fluid restriction.Per nephrology Na >130 ok when ready for d/c   7/16: ANA LILIA overnight, hypertonics d/c now on urea powder/1L fluid restriction/florinef and salt tabs per renal, hydrocortisone cream ordered for rash on back, rec for Home PT  7/17: ANA LILIA overnight, following Na, renal following, ENT saw for hoarse voice rec followup with CT surgery for possible dilation, pending Home PT. Patient developed sudden onset severe headache. Hyperventilating with increased work of breathing. Wheezing in all lung fields to auscultation. Neuro intact on exam. Duoneb x1 ordered. Dilaudid 0.25 IV given for pain control. Subsequently patient developed nausea with multiple episodes of vomiting. Hypertensive with SBP in the 190s. Given hydralazine 10mg IVP, Zofran. Stroke code and rapid response called. STAT labs sent. CTH/CTA/CTP completed. Tachycardic to the 180s, consistent with SVT, remained hypertensive. Given 10 of labetalol, SVT broke. Transferred to Telemetry. WBC 23.43, lactate 8.5. Given 1L fluid bolus. Pan culture sent. Started empirically on Vanc/Meropenem.   7/18: Put on eeg. Voiding while retaining urine, SC for 500cc. EEG +spikes, epilepsy consulted. CTA chest and CT A/P pending. Keppra increased 1g BID.   7/19: Cont EEG, trend Na, possible NGT today if not able to tolerate PO. Blood cx growing GS + cocci in clusters, f/u MRSA swab and pan cx, cont meropenem and vancomycin. Vanc trough in am. F/u urine studies.       OVERNIGHT EVENTS:  Vital Signs Last 24 Hrs  T(C): 36.9 (18 Jul 2023 21:50), Max: 36.9 (18 Jul 2023 21:50)  T(F): 98.4 (18 Jul 2023 21:50), Max: 98.4 (18 Jul 2023 21:50)  HR: 90 (19 Jul 2023 00:30) (90 - 120)  BP: 131/70 (19 Jul 2023 00:30) (131/70 - 149/95)  BP(mean): 94 (19 Jul 2023 00:30) (94 - 122)  RR: 18 (19 Jul 2023 00:30) (17 - 18)  SpO2: 96% (19 Jul 2023 00:30) (95% - 98%)    Parameters below as of 19 Jul 2023 00:30  Patient On (Oxygen Delivery Method): nasal cannula  O2 Flow (L/min): 2      I&O's Summary    17 Jul 2023 07:01  -  18 Jul 2023 07:00  --------------------------------------------------------  IN: 2650 mL / OUT: 1925 mL / NET: 725 mL    18 Jul 2023 07:01  -  19 Jul 2023 03:57  --------------------------------------------------------  IN: 1270 mL / OUT: 875 mL / NET: 395 mL        PHYSICAL EXAM:  General: NAD, pt is comfortably sitting up in bed, A&O x 2-3 ([place]), on RA  HEENT: CN II-XII grossly intact, PERRL 3mm, EOMI b/l, face symmetric, tongue midline, neck FROM, +EEG   Cardiovascular: RRR, normal S1 and S2   Respiratory: lungs CTAB, no wheezing, rhonchi, or crackles   GI: normoactive BS to auscultation, abd soft, NTND   Neuro: no aphasia, speech clear, no dysmetria, no pronator drift  strength 5/5 throughout all 4 extremities  sensation intact to light touch throughout   Extremities: distal pulses 2+ x4     TUBES/LINES:  [] Campuzano  [] Lumbar Drain  [] Wound Drains  [] Others    DIET:  [X] NPO  [] Mechanical  [] Tube feeds    LABS:                        12.2   28.59 )-----------( 84       ( 18 Jul 2023 05:30 )             35.6     07-18    133<L>  |  92<L>  |  13  ----------------------------<  197<H>  3.7   |  27  |  0.52    Ca    8.8      18 Jul 2023 05:30  Phos  3.7     07-18  Mg     2.0     07-18    TPro  6.5  /  Alb  3.8  /  TBili  1.4<H>  /  DBili  0.3  /  AST  25  /  ALT  75<H>  /  AlkPhos  64  07-17    PT/INR - ( 17 Jul 2023 14:34 )   PT: 12.8 sec;   INR: 1.07          PTT - ( 17 Jul 2023 14:34 )  PTT:21.0 sec  Urinalysis Basic - ( 18 Jul 2023 05:30 )    Color: x / Appearance: x / SG: x / pH: x  Gluc: 197 mg/dL / Ketone: x  / Bili: x / Urobili: x   Blood: x / Protein: x / Nitrite: x   Leuk Esterase: x / RBC: x / WBC x   Sq Epi: x / Non Sq Epi: x / Bacteria: x      CARDIAC MARKERS ( 17 Jul 2023 14:34 )  x     / x     / 36 U/L / x     / <1.0 ng/mL      CAPILLARY BLOOD GLUCOSE      POCT Blood Glucose.: 116 mg/dL (18 Jul 2023 22:27)  POCT Blood Glucose.: 173 mg/dL (18 Jul 2023 17:53)  POCT Blood Glucose.: 134 mg/dL (18 Jul 2023 11:24)  POCT Blood Glucose.: 221 mg/dL (18 Jul 2023 06:29)      Drug Levels: [] N/A    CSF Analysis: [] N/A      Allergies    amoxicillin (Rash)    Intolerances      MEDICATIONS:  Antibiotics:  meropenem  IVPB 2000 milliGRAM(s) IV Intermittent every 8 hours  vancomycin  IVPB 1000 milliGRAM(s) IV Intermittent every 12 hours    Neuro:  acetaminophen     Tablet .. 650 milliGRAM(s) Oral every 6 hours PRN  escitalopram 5 milliGRAM(s) Oral daily  levETIRAcetam  IVPB 1000 milliGRAM(s) IV Intermittent every 12 hours  melatonin 5 milliGRAM(s) Oral at bedtime  ondansetron Injectable 4 milliGRAM(s) IV Push every 6 hours PRN    Anticoagulation:    OTHER:  atorvastatin 20 milliGRAM(s) Oral at bedtime  benzocaine/menthol Lozenge 1 Lozenge Oral every 4 hours PRN  dexAMETHasone  Injectable 4 milliGRAM(s) IV Push every 12 hours  fludroCORTISONE 0.2 milliGRAM(s) Oral daily  fluticasone propionate 50 MICROgram(s)/spray Nasal Spray 1 Spray(s) Both Nostrils two times a day  hydrocortisone 1% Cream 1 Application(s) Topical daily  insulin lispro (ADMELOG) corrective regimen sliding scale   SubCutaneous Before meals and at bedtime  senna 2 Tablet(s) Oral at bedtime  sucralfate 1 Gram(s) Oral every 12 hours    IVF:  sodium chloride 3 Gram(s) Oral every 6 hours  sodium chloride 0.9%. 1000 milliLiter(s) IV Continuous <Continuous>    CULTURES:  Culture Results:   Culture in progress (07-17 @ 18:48)  Culture Results:   Culture in progress (07-17 @ 18:48)    RADIOLOGY & ADDITIONAL TESTS:      ASSESSMENT:  Pt is a 67YO male with a past medical history of type 2 diabetes, hyperlipidemia, iron deficiency anemia, tracheal stenosis s/p tracheostomy (which was closed by ENT 7 days ago); glioblastoma s/p resection 1/27/2022, and Avastin treatment 3/2023 presenting with expressive aphasia, nuasea and vomiting x1 day. CT shows new hyperdensity in left frontal parietal - surgical site c/f disease progression. Na in the .       ALTERED MENTAL STATUS;BRAIN MASS    Abnormal electrocardiogram [ECG] [EKG]    Allergy, unspecified, initial encounter    Anemia, unspecified    Benign prostatic hyperplasia without lower urinary tract symptoms    Calculus of kidney    Cough, unspecified    Depression, unspecified    ENCOUNTER FOR ATTENT    Encounter for general adult medical examination without abnormal findings    Encounter for immunization    Encounter for immunization safety counseling    Encounter for other preprocedural examination    Encounter for screening for malignant neoplasm of prostate    Encounter for screening for other metabolic disorders    Encounter for screening for other viral diseases    ESSENTIAL (PRIMARY)    Gastro-esophageal reflux disease without esophagitis    Generalized anxiety disorder    History of Glioblastoma    Hyperlipidemia, unspecified    Hypo-osmolality and hyponatremia    Hypotension, unspecified    Iron deficiency    Iron deficiency anemia, unspecified    LONG TERM (CURRENT)    LONG TERM (CURRENT)    LONG TERM (CURRENT)    Malignant neoplasm of brain, unspecified    Nausea with vomiting, unspecified    Other constipation    Other fatigue    Other injury of unspecified body region, initial encounter    OTHER SPECIFIED DISE    Personal history of malignant neoplasm, unspecified    Personal history of other diseases of the nervous system and sense organs    Personal history of other endocrine, nutritional and metabolic disease    Personal history of other mental and behavioral disorders    Personal history of transient ischemic attack (TIA), and cerebral infarction without residual deficits    POSTPROCEDURAL SUBGL    Pruritus, unspecified    Shortness of breath    Tachycardia, unspecified    Thrombocytopenia, unspecified    Unspecified abdominal pain    Unspecified visual disturbance    Urinary calculus, unspecified    Vitamin D deficiency, unspecified    No pertinent family history in first degree relatives    FH: diabetes mellitus (Father, Mother)    FH: hyperlipidemia (Father)    FH: hypertension (Father, Mother)    Handoff    MEWS Score    No pertinent past medical history    Diabetes mellitus    Hypertension    Glioblastoma    Type 2 diabetes mellitus    Hyperlipidemia    Iron deficiency anemia    Nephrolithiasis    Thrombocytopenia    Hyponatremia    BPH (benign prostatic hyperplasia)    No pertinent past medical history    Altered mental status    Hyponatremia    Hypertension    Glioblastoma    Type 2 diabetes mellitus    Hyperlipidemia    Iron deficiency anemia    Thrombocytopenia    BPH (benign prostatic hyperplasia)    Hyponatremia    No significant past surgical history    No significant past surgical history    S/P craniotomy    H/O tracheostomy    No significant past surgical history    MED EVAL    Type 2 diabetes mellitus    BPH (benign prostatic hyperplasia)    90+    Room Service Assist    Brain mass    Hypertension    Glioblastoma    Type 2 diabetes mellitus    Hyperlipidemia    Iron deficiency anemia    Thrombocytopenia    BPH (benign prostatic hyperplasia)    Tracheal stenosis    [email or web address]        PLAN:  Neuro:  -neuro q4/vitals q4hrs   -CTH 7/7: Interval development of a 13 mm hyperdense nodule along the   inferior margin of the resection cavity which may represent progression   of disease with hemorrhage. Repeat CTH stable, CTH 7/17 stable   -CTA 7/7: negative, CTA 7/17 stable  -CTP 7/17 stable.   -pain control prn  -Keppra increased to 1g BID  -Decadron 4mg BID  -c/w home Lexapro  -home ASA 81 held i/s/o thrombocytopenia   -MRI suspicious for recurrent tumor and hydro   -EEG 7/17: Occasional to frequent sharply contoured waves over left frontotemporal region  -epilepsy consult    Pulm:  -satting well on RA    Cardio:  --160    GI:  -NPO for now, when taking PO, resume CCD with 1L fluid restriction  -bowel regimen  -sucralfate  -last BM 7/15    Renal:  -hyponatremic, Na 118 on admission  - NS @ 70 while NPO; off hypertonics since 7/15  -florinef 0.2mg daily, salt tabs 3q6  -voiding, sc prn   -dc'd urea powder 15g BID started per Renal d/t petechial rash     Endo:  -ISS  - A1c 5.7  -h/o T2DM: home Januvia held  -h/o HLD: c/w Atorvastatin 20mg    Heme:  -SCDs for DVT ppx, SQL held for thrombocytopenia   -heme consulted for thrombocytopenia- w/u sent, likely chronic thrombocytopenia d/t chemotherapy agent in past   -LE dopplers 7/8 negative    ID:  -pan cx 7/7; aerobic bottles growing GS+ cocci in clusters   - f/u MRSA swab   -vancomycin/meropenem empirically (7/17- )    Dispo: regional status, full code, Home PT    D/w Dr. Dorado

## 2023-07-19 NOTE — PROGRESS NOTE ADULT - SUBJECTIVE AND OBJECTIVE BOX
Patient is a 68y Male seen and evaluated at bedside.       Meds:    acetaminophen     Tablet .. 650 every 6 hours PRN  atorvastatin 20 at bedtime  benzocaine/menthol Lozenge 1 every 4 hours PRN  dexAMETHasone  Injectable 4 every 12 hours  escitalopram 5 daily  fludroCORTISONE 0.2 daily  fluticasone propionate 50 MICROgram(s)/spray Nasal Spray 1 two times a day  hydrocortisone 1% Cream 1 daily  insulin lispro (ADMELOG) corrective regimen sliding scale  Before meals and at bedtime  levETIRAcetam  IVPB 1000 every 12 hours  melatonin 5 at bedtime  meropenem  IVPB 2000 every 8 hours  ondansetron Injectable 4 every 6 hours PRN  senna 2 at bedtime  sodium chloride 3 every 6 hours  sodium chloride 0.9%. 1000 <Continuous>  sucralfate 1 every 12 hours  vancomycin  IVPB 1250 once  vancomycin  IVPB 1250 every 12 hours      T(C): , Max: 36.9 (07-18-23 @ 21:50)  T(F): , Max: 98.4 (07-18-23 @ 21:50)  HR: 96 (07-19-23 @ 08:26)  BP: 143/71 (07-19-23 @ 08:26)  BP(mean): 99 (07-19-23 @ 08:26)  RR: 17 (07-19-23 @ 08:26)  SpO2: 98% (07-19-23 @ 08:26)  Wt(kg): --    07-18 @ 07:01  -  07-19 @ 07:00  --------------------------------------------------------  IN: 1830 mL / OUT: 2075 mL / NET: -245 mL    07-19 @ 07:01  -  07-19 @ 11:12  --------------------------------------------------------  IN: 190 mL / OUT: 200 mL / NET: -10 mL          Review of Systems:  ROS negative except as per HPI      PHYSICAL EXAM:  General: NAD, on RA  Cardiovascular: RRR, normal S1 and S2   Respiratory: lungs CTAB  GI: abd soft, NTND   Neuro: AAOx2-3  Extremities: no edema      LABS:                        10.5   15.97 )-----------( 78       ( 19 Jul 2023 05:30 )             32.0     07-19    130<L>  |  94<L>  |  11  ----------------------------<  147<H>  3.3<L>   |  24  |  0.52    Ca    8.0<L>      19 Jul 2023 05:30  Phos  1.9     07-19  Mg     1.8     07-19    TPro  6.5  /  Alb  3.8  /  TBili  1.4<H>  /  DBili  0.3  /  AST  25  /  ALT  75<H>  /  AlkPhos  64  07-17      PT/INR - ( 17 Jul 2023 14:34 )   PT: 12.8 sec;   INR: 1.07          PTT - ( 17 Jul 2023 14:34 )  PTT:21.0 sec  Urinalysis Basic - ( 19 Jul 2023 05:30 )    Color: x / Appearance: x / SG: x / pH: x  Gluc: 147 mg/dL / Ketone: x  / Bili: x / Urobili: x   Blood: x / Protein: x / Nitrite: x   Leuk Esterase: x / RBC: x / WBC x   Sq Epi: x / Non Sq Epi: x / Bacteria: x      Osmolality, Random Urine: 508 mosm/kg (07-19 @ 04:03)  Sodium, Random Urine: 94 mmol/L (07-19 @ 04:03)        RADIOLOGY & ADDITIONAL STUDIES:

## 2023-07-19 NOTE — EEG REPORT - NS EEG TEXT BOX
Daily Updates (from 07:00 am until 07:00 am):  Day 2  7/18-7/19/2023:   Pertinent medications: LEV 750mg BID   Background:  continuous, with predominantly alpha/theta frequencies.  Symmetry:  near continuous irregular delta>that’s over left hemisphere, max in frontotemporal region   Posterior Dominant Rhythm:  8 Hz fragmented.  Organization: Rudimentary.  Voltage:  high  Variability: Yes. 		Reactivity: Yes.  N2 sleep: Symmetric, synchronous spindles and K complexes.  Spontaneous Activity:  Occasional to frequent sharply contoured waves over left frontotemporal region, variable max  Periodic/rhythmic activity:  None  Events:  No electrographic seizures or significant clinical events.  Provocations:  Hyperventilation and Photic stimulation: was not performed.    Daily Summary:    1) Mild to moderate generalized   slowing suggestive of a mild to moderate degree of diffuse or multifocal  cerebral dysfunction.  2) Focal slowing over left hemisphere, more prominent in frontotemporal region suggestive of focal cerebral dysfunction  3) Occasional to frequent sharply contoured waves over left frontotemporal region, variable max, indicating underlying hyperexcitability.     Marilyn Guzman MD  Attending Neurologist, [facility]

## 2023-07-19 NOTE — SWALLOW BEDSIDE ASSESSMENT ADULT - PHARYNGEAL PHASE
Seemingly timely swallow, hyolaryngeal movement appreciated, with single instance of cough noted post the swallow with thin liquids, not replicated with remaining trials via cup and straw. No clinical indicators of penetration/aspiration noted with purees and solids. No gross clinical indicators of pharyngeal inefficiency.

## 2023-07-19 NOTE — SWALLOW BEDSIDE ASSESSMENT ADULT - SLP GENERAL OBSERVATIONS
Pt was seen fully awake and alert, HOB fully elevated, on 2L of O2 via NC. A&Ox2 (self and date), followed simple directives, and communicated wants/needs. Brother-in-law at bedside. Pt was seen fully awake and alert, HOB fully elevated, on 2L of O2 via NC. A&Ox2 (self and date), followed simple directives, and communicated wants/needs. Brother-in-law at bedside. Bilateral wrist restraints present and released for PO intake. Placed back on at completion of assessment.

## 2023-07-19 NOTE — SWALLOW BEDSIDE ASSESSMENT ADULT - SWALLOW EVAL: FEEDING ASSISTANCE
bilateral wrist restraints present at this time, at RN and medical team discretion to lift restraints during meals/minimal assistance required

## 2023-07-19 NOTE — CONSULT NOTE ADULT - TIME BILLING
Briefly, 69 yo male with probable recurrent GBM with rash and low grade fever Monday following initiation of PO urea the day prior--?drug reaction/rash/fever--now improving. Bcx with S. epidermidis 7/14 (2 of 4 bottles however sets probably drawn from same site) most likely reflect contamination; less likely translocation from previously infiltrated PIV. Advise repeat surveillance bcx today. d/c vancomycin and meropenem (valeria given c/f reduced seizure threshold documented by epilepsy team). Please reconsult ID if properly obtained surveillance bcx grow same organism again.
chart review - discussion with family and NSICU team - review of pertinent labs and neuroimaging.

## 2023-07-19 NOTE — CONSULT NOTE ADULT - ASSESSMENT
67yo male with a past medical history of type 2 diabetes, hyperlipidemia, iron deficiency anemia, tracheal stenosis s/p tracheostomy (which was closed by ENT 7 days ago); glioblastoma s/p resection 1/27/2022, and Avastin treatment 3/2023 presenting with altered mental status and weakness x2 days. Stroke code activated, imaging negative for CVA. Head CT showed new hyperdensity in left frontal parietal - surgical site c/f disease progression. Patient again with agitation and AMS on 7/17, found to be febrile to 100.5 with leukocytosis, blood cx obtained now growing MRSE in 2/4 bottles. Patient c/o headache at the time of AMS, started on vancomycin and meropenem for presumed meningitis (patient with allergy to amoxicillin). Patient now asymptomatic, no longer febrile and alternate explanations more likely for AMS. MRSE found on culture likely contaminant. Low-grade temp at the time probably corresponding with drug rash/drug fever given patient also experienced a new-onset rash at the time which has now resolved.    Recommendations:  1. Send surveillance blood cultures today -- please ensure site properly cleaned with chlorhexidine prior to obtaining sample  2. Discontinue Vancomycin and Meropenem    ID Team 1 will sign off. Please reconsult if surveillance blood cultures are positive for the same organism.

## 2023-07-19 NOTE — PROGRESS NOTE ADULT - ASSESSMENT
69YO male with a past medical history of type 2 diabetes, hyperlipidemia, iron deficiency anemia, tracheal stenosis s/p tracheostomy (which was closed by ENT 7 days ago); glioblastoma s/p resection 1/27/2022, and Avastin treatment 3/2023 presenting with expressive aphasia, nuasea and vomiting x1 day. CT shows new hyperdensity in left frontal parietal - surgical site c/f disease progression.  Patient was clinically stable for DC then developed acute deterioration on 7/17    #Hyper density in left frontal parietal which may represent progression of disease.   -Management per primary team   -Continue Keppra 750mg BID  -Continue Decadron 4mg q12  -Pt's ASA 81 held i/s/o thrombocytopenia     #SIRS with end organ damage  - febrile, leukocytosis and tachycardia with elevated lactic acid.  No clear source.  Patient's only complaint was of headache which is improving, no photophobia.  UA negative, CXR wnl but was immediately after vomiting event.  Rash started day prior  - CT C/A/P w/o infectious source  - blood cultures likely contaminant, send surveillance cultures  - ID consulted, appreciate recs.  Low concern for meningitis per neurosurgery team, likely drug reaction.  Holding abx    #Rash  - per primary team started after URENA, will stop.  Rash resolving  - continue to monitor    #Tachycardia  - developed SVT to 170s during rapid response, responded to IV labetalol with return to NSR.  Currently sinus tachy to 120s on tele  - treat underlying cause, likely infection  - trops during HTN emergency/rapid response negative    #Metabolic acidosis with lactic acidosis  - likely due to SIRS, lactic acidosis improving, anion gap closed  - beta hydroxybutyrate and urine ketones negative    #Hyponatremia   -Renal consult appreciated    -florinef 0.2mg daily, salt tabs 2gm q6   -trend BMP    #Type II Diabetes  - A1c 5.7  -Pt is on Januvia at home   -Continue ISS, q6 while NPO    #HLD  - Continue Atorvastatin 20mg daily     #Thrombocytopenia   -Pt's platelets are stable 70-100k  -Heme consulted for thrombocytopenia; Per Heme documentation the pt w/  history of thrombocytopenia with his baseline range  since the latter half of last year and that the pt's Pt's thrombocytopenia likely due to past treatment with temozolomide.   Per heme transfuse 1U plt if bleeding and platelet count <50k, if febrile and platelet count <20k,   Dopplers 7/8/23 negative for DVT    #Dispo:   -Pt is for Home PT pending medical improvement    Recommend GOC discussion with family given possible progression of disease

## 2023-07-19 NOTE — SWALLOW BEDSIDE ASSESSMENT ADULT - SLP PRECAUTIONS/LIMITATIONS: HEARING
within functional limits
I, French Delgado MD,  performed the initial face to face bedside interview with this patient regarding history of present illness, review of symptoms and relevant past medical, social and family history.  I completed an independent physical examination.  I was the initial provider who evaluated this patient. I have signed out the follow up of any pending tests (i.e. labs, radiological studies) to the ACP.  I have communicated the patient’s plan of care and disposition with the ACP.  The history, relevant review of systems, past medical and surgical history, medical decision making, and physical examination was documented by the scribe in my presence and I attest to the accuracy of the documentation.  I, French Delgado MD, personally saw the patient with ACP.  I have personally performed a face to face diagnostic evaluation on this patient.  I have reviewed the ACP note and agree with the history, exam, and plan of care, except as noted.

## 2023-07-19 NOTE — SWALLOW BEDSIDE ASSESSMENT ADULT - ADDITIONAL RECOMMENDATIONS
LT: Pt will safely tolerate the least restrictive diet without overt s/s of penetration/aspiration or changes to pulmonary status.

## 2023-07-19 NOTE — PROGRESS NOTE ADULT - ASSESSMENT
***** INCOMPLETE*****    	  Assessment:  67 y/o male with a past medical history of type 2 diabetes, hyperlipidemia, iron deficiency anemia, tracheal stenosis s/p tracheostomy (which was closed by ENT 7 days ago); glioblastoma s/p resection 1/27/2022, and Avastin treatment 3/2023 presenting with expressive aphasia, nausea and vomiting, and overall deterioration of his mental status since last May per family.  CTH from this admission shows new hyperdensity in left frontal parietal - surgical site c/f disease progression.  Patient was clinically stable for DC then developed acute deterioration on 7/17. Patient complained of a severe frontal headache, accompanied by bilateral arm jerks facial twitching, and difficulty breathing. He developed a fever, worsening leukocytosis, increasing lactate, and SVT. Infectious etiology suspected, so he was started on vanco and Jone empirically. No clear source identified.      Given the patient's history seizures can't be completely ruled out as a contributing factor of his current presentation, however lab results like his lactate cannot be attributed to seizure activity in the absence of a full tonic-clonic event. Difficult to establish is there is neck rigidity on exam as the patient has difficulties following commands. Pt was started on Keppra prophylactically after his GBM resection in 2022. His dose was increased to 750mg BID last year after the pt's family noticed occasional arm jerking movements. vEEG did not capture any seizures but there are indications of underlying hyperexcitability. Pt has several risk factors for breakthrough seizures given the progression of his disease, his acute worsening and Meropenem therapy.  Patient had a remarkable medical improvement compared to yesterday. He has remained afebrile, lactate normalized, and white count almost halved. His mental status has improved, he is less drowsy and able to participate with the neurological exam. Family reports that they noticed an almost immediate improvement after starting antibiotic therapy. Given the       Recommendations:   - c/w vEEG for now.   - Increase Keppra to 1000mg BID  - d/c Meropenen if possible (lowers seizure threshold)  - Would consider LP given lack of infectious source  - Seizure & Fall precautions  - Ativan 2mg IVP for seizures > 2mins  - Keep Mg>2 and K >4.   - Management per primary team.     Please call us if any questions or neurological changes.      	  Assessment:  69 y/o male with a past medical history of type 2 diabetes, hyperlipidemia, iron deficiency anemia, tracheal stenosis s/p tracheostomy (which was closed by ENT 7 days ago); glioblastoma s/p resection 1/27/2022, and Avastin treatment 3/2023 presenting with expressive aphasia, nausea and vomiting, and overall deterioration of his mental status since last May per family.  CTH from this admission shows new hyperdensity in left frontal parietal - surgical site c/f disease progression.  Patient was clinically stable for DC then developed acute deterioration on 7/17. Patient complained of a severe frontal headache, accompanied by bilateral arm jerks facial twitching, and difficulty breathing. He developed a fever, worsening leukocytosis, increasing lactate, and SVT. Infectious etiology suspected, so he was started on vanco and Jone empirically. No clear source identified.      Given the patient's history seizures can't be completely ruled out as a contributing factor of his current presentation, however lab results like his lactate cannot be attributed to seizure activity in the absence of a full tonic-clonic event. Difficult to establish is there is neck rigidity on exam as the patient has difficulties following commands. Pt was started on Keppra prophylactically after his GBM resection in 2022. His dose was increased to 750mg BID last year after the pt's family noticed occasional arm jerking movements. vEEG did not capture any seizures but there are indications of underlying hyperexcitability. Pt has several risk factors for breakthrough seizures given the progression of his disease, his acute worsening and Meropenem therapy.  Patient had a remarkable medical improvement compared to yesterday. He has remained afebrile, lactate normalized, and white count almost halved. His mental status has improved, he is less drowsy and able to participate with the neurological exam. Family reports that they noticed an almost immediate improvement after starting antibiotic therapy. Patient has remained seizure free since yesterday.      Recommendations:   - d/c vEEG   - c/w Keppra 1000mg BID  - d/c Meropenen if possible (lowers seizure threshold)  - Would consider LP given lack of infection's source  - Seizure & Fall precautions  - Ativan 2mg IVP for seizures > 2mins  - Keep Mg>2 and K >4.   - Management per primary team.     Please call us if any questions or neurological changes.          	  Assessment:  67 y/o male with a past medical history of type 2 diabetes, hyperlipidemia, iron deficiency anemia, tracheal stenosis s/p tracheostomy (which was closed by ENT 7 days ago); glioblastoma s/p resection 1/27/2022, and Avastin treatment 3/2023 presenting with expressive aphasia, nausea and vomiting, and overall deterioration of his mental status since last May per family.  CTH from this admission shows new hyperdensity in left frontal parietal - surgical site c/f disease progression.  Patient was clinically stable for DC then developed acute deterioration on 7/17. Patient complained of a severe frontal headache, accompanied by bilateral arm jerks facial twitching, and difficulty breathing. He developed a fever, worsening leukocytosis, increasing lactate, and SVT. Infectious etiology suspected, so he was started on vanco and Jone empirically. No clear source identified.      Given the patient's history seizures can't be completely ruled out as a contributing factor of his current presentation, however lab results like his lactate cannot be attributed to seizure activity in the absence of a full tonic-clonic event. Difficult to establish is there is neck rigidity on exam as the patient has difficulties following commands. Pt was started on Keppra prophylactically after his GBM resection in 2022. His dose was increased to 750mg BID last year after the pt's family noticed occasional arm jerking movements. vEEG did not capture any seizures but there are indications of underlying hyperexcitability. Pt has several risk factors for possible breakthrough seizure given the progression of his disease?, his acute worsening and Meropenem therapy.  Patient had a remarkable medical improvement compared to yesterday. He has remained afebrile, lactate normalized, and white count almost halved. His mental status has improved, he is less drowsy and able to participate with the neurological exam. Family reports that they noticed an almost immediate improvement after starting antibiotic therapy. Patient has remained seizure free since yesterday.      Recommendations:   - d/c vEEG   - c/w Keppra 1000mg BID  - d/c Meropenem if possible (lowers seizure threshold)  - Would consider LP given lack of infection's source  - Seizure & Fall precautions  - Ativan 2mg IVP for seizures > 2mins  - Keep Mg>2 and K >4.   - Management per primary team.     Please call us if any questions or neurological changes.

## 2023-07-19 NOTE — PROGRESS NOTE ADULT - SUBJECTIVE AND OBJECTIVE BOX
Patient is a 68y old  Male who presents with a chief complaint of Altered Mental Status & Weakness x2 days (19 Jul 2023 13:09)      SUBJECTIVE:  Patient seen and examined at bedside.  Mental status improved, closer to baseline.  Patient reports feeling well this AM, headache has resolved.  Wants to try eating    ROS:  Denies fevers, chills,, vision changes, cough, SOB, chest pain, Abdominal pain, N/V.  All other ROS negative except as above    Vital Signs Last 12 Hrs  T(F): 97.7 (07-19-23 @ 18:20), Max: 98.1 (07-19-23 @ 09:00)  HR: 88 (07-19-23 @ 15:29) (88 - 100)  BP: 140/81 (07-19-23 @ 15:29) (140/81 - 153/83)  BP(mean): 104 (07-19-23 @ 15:29) (99 - 112)  RR: 18 (07-19-23 @ 15:29) (17 - 18)  SpO2: 100% (07-19-23 @ 15:29) (96% - 100%)  I&O's Summary    18 Jul 2023 07:01  -  19 Jul 2023 07:00  --------------------------------------------------------  IN: 1830 mL / OUT: 2075 mL / NET: -245 mL    19 Jul 2023 07:01  -  19 Jul 2023 18:28  --------------------------------------------------------  IN: 70 mL / OUT: 1200 mL / NET: -1130 mL        PHYSICAL EXAM:  Constitutional: NAD  HEENT: PERRLA, EOMI, MMM  Neck: Supple  Respiratory: CTA B/L. No w/r/r.   Cardiovascular: RRR, regular, normal S1 and S2, no m/r/g.   Gastrointestinal: +BS, soft NTND  Extremities: wwp; no cyanosis, clubbing or edema.   Vascular: Pulses equal and strong throughout.   Neurological: Awake, alert to self, follows commands, slightly more lethargic  Skin: macular rash on chest/back and legs resolving        LABS:                        10.5   15.97 )-----------( 78       ( 19 Jul 2023 05:30 )             32.0     07-19    130<L>  |  94<L>  |  11  ----------------------------<  147<H>  3.3<L>   |  24  |  0.52    Ca    8.0<L>      19 Jul 2023 05:30  Phos  1.9     07-19  Mg     1.8     07-19    TPro  6.5  /  Alb  3.8  /  TBili  1.4<H>  /  DBili  0.3  /  AST  25  /  ALT  75<H>  /  AlkPhos  64  07-17      Urinalysis Basic - ( 19 Jul 2023 05:30 )    Color: x / Appearance: x / SG: x / pH: x  Gluc: 147 mg/dL / Ketone: x  / Bili: x / Urobili: x   Blood: x / Protein: x / Nitrite: x   Leuk Esterase: x / RBC: x / WBC x   Sq Epi: x / Non Sq Epi: x / Bacteria: x          RADIOLOGY & ADDITIONAL TESTS:    < from: CT Angio Chest PE Protocol w/ IV Cont (07.18.23 @ 19:12) >  IMPRESSION:  1. No pulmonary embolism.    2. Groundglass opacity in the lungs bilaterally. This is suspicious for   pulmonary edema. Atypical infection or hemorrhage could've a similar   appearance.    3. Soft tissue swelling in the arms bilaterally is partially visualized.   Recommend ultrasound exam to rule out DVT.    4. Aortic root aneurysm measuring 4.3 cm.      < end of copied text >    MEDICATIONS  (STANDING):  atorvastatin 20 milliGRAM(s) Oral at bedtime  dexAMETHasone  Injectable 4 milliGRAM(s) IV Push every 12 hours  escitalopram 5 milliGRAM(s) Oral daily  fludroCORTISONE 0.2 milliGRAM(s) Oral daily  fluticasone propionate 50 MICROgram(s)/spray Nasal Spray 1 Spray(s) Both Nostrils two times a day  hydrocortisone 1% Cream 1 Application(s) Topical daily  insulin lispro (ADMELOG) corrective regimen sliding scale   SubCutaneous Before meals and at bedtime  levETIRAcetam  IVPB 1000 milliGRAM(s) IV Intermittent every 12 hours  melatonin 5 milliGRAM(s) Oral at bedtime  potassium chloride   Powder 40 milliEquivalent(s) Oral every 6 hours  senna 2 Tablet(s) Oral at bedtime  sodium chloride 3 Gram(s) Oral every 6 hours  sucralfate 1 Gram(s) Oral every 12 hours    MEDICATIONS  (PRN):  acetaminophen     Tablet .. 650 milliGRAM(s) Oral every 6 hours PRN Temp greater or equal to 38C (100.4F), Mild Pain (1 - 3)  benzocaine/menthol Lozenge 1 Lozenge Oral every 4 hours PRN Sore Throat  ondansetron Injectable 4 milliGRAM(s) IV Push every 6 hours PRN Nausea and/or Vomiting

## 2023-07-19 NOTE — PROGRESS NOTE ADULT - ASSESSMENT
68Y M with no hx of CKD, Glioblastoma, chronic hyponatremia, on admission (7/7) found to have acute on chronic symptomatic hyponatremia with sNa of 115, patient appropriately managed by Neurology, sNa now fluctuating between 130-135      #Chronic asymptomatic hyponatremia   Recent Urine osm 508 and Suleman 94 7/19, likely SIADH given intracranial pathology along with pt on SSRI   TSH wnl  Pt on Sodium Tabs 3g Q6HRS, Florinef 0.2mcg, and fluid restriction   Na 130 today      Plan:  Restart Urena 15g BID if patient able to tolerate PO. C/w salt tabs, florinef and fluid restriction as above  If Na remains low or low-normal, may try tolvaptan tomorrow  Follow up BMP in evening  UOsm and Suleman in the morning w/ AM BMP  Check AM cortisol to complete workup of hyponatremia   68Y M with no hx of CKD, Glioblastoma, chronic hyponatremia, on admission (7/7) found to have acute on chronic symptomatic hyponatremia with sNa of 115, patient appropriately managed by Neurology, sNa now fluctuating between 130-135      #Chronic asymptomatic hyponatremia   Recent Urine osm 508 and Suleman 94 7/19, likely SIADH given intracranial pathology along with pt on SSRI   TSH wnl  Pt on Sodium Tabs 3g Q6HRS, Florinef 0.2mcg, and fluid restriction   Na 130 today      Plan:  Restart Urena 15g BID if patient able to tolerate PO. C/w salt tabs, florinef and fluid restriction as above  If Na lower , may try tolvaptan tomorrow  Follow up BMP in evening  UOsm and Suleman in the morning w/ AM BMP  Check AM cortisol to complete workup of hyponatremia   68Y M with no hx of CKD, Glioblastoma, chronic hyponatremia, on admission (7/7) found to have acute on chronic symptomatic hyponatremia with sNa of 115, patient appropriately managed by Neurology, sNa now fluctuating between 130-135      #Chronic asymptomatic hyponatremia   Recent Urine osm 508 and Suleman 94 7/19, likely SIADH given intracranial pathology along with pt on SSRI   TSH wnl  Pt on Sodium Tabs 3g Q6HRS, Florinef 0.2mcg, and fluid restriction   Na 130 today      Plan:  Restart Urena 15g BID if patient able to tolerate PO. C/w salt tabs and fluid restriction as above  Taper off florinef as no evidence of adrenal insufficiency  If Na lower , may try tolvaptan tomorrow  Follow up BMP in evening  UOsm and Suleman in the morning w/ AM BMP  Check AM cortisol to complete workup of hyponatremia

## 2023-07-19 NOTE — PROGRESS NOTE ADULT - SUBJECTIVE AND OBJECTIVE BOX
Inteval history:  Lactate normalized. Leukocytosis improved, no fevers. Mental status improved.    ROS  As above, otherwise negative for constitutional/HEENT/CV/pulm/GI//MSK/neuro/derm/endocrine/psych.     MEDICATIONS  (STANDING):  atorvastatin 20 milliGRAM(s) Oral at bedtime  dexAMETHasone  Injectable 4 milliGRAM(s) IV Push every 12 hours  escitalopram 5 milliGRAM(s) Oral daily  fludroCORTISONE 0.2 milliGRAM(s) Oral daily  fluticasone propionate 50 MICROgram(s)/spray Nasal Spray 1 Spray(s) Both Nostrils two times a day  hydrocortisone 1% Cream 1 Application(s) Topical daily  insulin lispro (ADMELOG) corrective regimen sliding scale   SubCutaneous Before meals and at bedtime  levETIRAcetam  IVPB 1000 milliGRAM(s) IV Intermittent every 12 hours  melatonin 5 milliGRAM(s) Oral at bedtime  meropenem  IVPB 2000 milliGRAM(s) IV Intermittent every 8 hours  senna 2 Tablet(s) Oral at bedtime  sodium chloride 3 Gram(s) Oral every 6 hours  sodium chloride 0.9%. 1000 milliLiter(s) (70 mL/Hr) IV Continuous <Continuous>  sucralfate 1 Gram(s) Oral every 12 hours  vancomycin  IVPB 1250 milliGRAM(s) IV Intermittent every 12 hours    MEDICATIONS  (PRN):  acetaminophen     Tablet .. 650 milliGRAM(s) Oral every 6 hours PRN Temp greater or equal to 38C (100.4F), Mild Pain (1 - 3)  benzocaine/menthol Lozenge 1 Lozenge Oral every 4 hours PRN Sore Throat  ondansetron Injectable 4 milliGRAM(s) IV Push every 6 hours PRN Nausea and/or Vomiting      T(C): 36.7 (23 @ 05:09), Max: 36.9 (23 @ 21:50)  HR: 96 (23 @ 08:26) (90 - 118)  BP: 143/71 (23 @ 08:26) (131/70 - 147/77)  RR: 17 (23 @ 08:26) (17 - 18)  SpO2: 98% (23 @ 08:26) (95% - 98%)  Wt(kg): --    General:  Constitutional:  Sitting comfortably in NAD.  Ears, Nose, Throat: no abnormalities, mucus membranes moist  Neck:  no lymphadenopathy  Extremities: no edema, clubbing or cyanosis  Skin: no rash     Cognitive:  Oriented to self, place, and month.    Cranial Nerves:  II: BRUNO. III/IV/VI: EOM Full.  mild nystagmus when eye tracking  V1V2V3: Symmetric, VII: Face appears symmetric VIII: Normal to screening, IX/X: Palate Elevates Symmetrical  XI: Trapezius Symmetric  XII: Tongue midline  Motor (limited exam, as patient is on restrains):  Power: power 5/5 distal U/E  Tone: normal x 4 limbs  No tremor  Coordination/Gait:  Finger-nose intact,         Investigations:  CBC Full  -  ( 2023 05:30 )  WBC Count : 15.97 K/uL  RBC Count : 3.18 M/uL  Hemoglobin : 10.5 g/dL  Hematocrit : 32.0 %  Platelet Count - Automated : 78 K/uL  Mean Cell Volume : 100.6 fl  Mean Cell Hemoglobin : 33.0 pg  Mean Cell Hemoglobin Concentration : 32.8 gm/dL  Auto Neutrophil # : x  Auto Lymphocyte # : x  Auto Monocyte # : x  Auto Eosinophil # : x  Auto Basophil # : x  Auto Neutrophil % : x  Auto Lymphocyte % : x  Auto Monocyte % : x  Auto Eosinophil % : x  Auto Basophil % : x    07-    130<L>  |  94<L>  |  11  ----------------------------<  147<H>  3.3<L>   |  24  |  0.52    Ca    8.0<L>      2023 05:30  Phos  1.9     -  Mg     1.8     -    TPro  6.5  /  Alb  3.8  /  TBili  1.4<H>  /  DBili  0.3  /  AST  25  /  ALT  75<H>  /  AlkPhos  64  17    LIVER FUNCTIONS - ( 2023 19:22 )  Alb: 3.8 g/dL / Pro: 6.5 g/dL / ALK PHOS: 64 U/L / ALT: 75 U/L / AST: 25 U/L / GGT: x           PT/INR - ( 2023 14:34 )   PT: 12.8 sec;   INR: 1.07          PTT - ( 2023 14:34 )  PTT:21.0 sec      EE/18   1) Mild to moderate generalized   slowing suggestive of a mild to moderate degree of diffuse or multifocal  cerebral dysfunction.  2) Focal slowing over left hemisphere, more prominent in frontotemporal region suggestive of focal cerebral dysfunction  3) Occasional to frequent sharply contoured waves over left frontotemporal region, variable max, indicating underlying hyperexcitability.    Inteval history:  Lactate normalized. Leukocytosis improved, no fevers. Mental status improved.    ROS  As above, otherwise negative for constitutional/HEENT/CV/pulm/GI//MSK/neuro/derm/endocrine/psych.     MEDICATIONS  (STANDING):  atorvastatin 20 milliGRAM(s) Oral at bedtime  dexAMETHasone  Injectable 4 milliGRAM(s) IV Push every 12 hours  escitalopram 5 milliGRAM(s) Oral daily  fludroCORTISONE 0.2 milliGRAM(s) Oral daily  fluticasone propionate 50 MICROgram(s)/spray Nasal Spray 1 Spray(s) Both Nostrils two times a day  hydrocortisone 1% Cream 1 Application(s) Topical daily  insulin lispro (ADMELOG) corrective regimen sliding scale   SubCutaneous Before meals and at bedtime  levETIRAcetam  IVPB 1000 milliGRAM(s) IV Intermittent every 12 hours  melatonin 5 milliGRAM(s) Oral at bedtime  meropenem  IVPB 2000 milliGRAM(s) IV Intermittent every 8 hours  senna 2 Tablet(s) Oral at bedtime  sodium chloride 3 Gram(s) Oral every 6 hours  sodium chloride 0.9%. 1000 milliLiter(s) (70 mL/Hr) IV Continuous <Continuous>  sucralfate 1 Gram(s) Oral every 12 hours  vancomycin  IVPB 1250 milliGRAM(s) IV Intermittent every 12 hours    MEDICATIONS  (PRN):  acetaminophen     Tablet .. 650 milliGRAM(s) Oral every 6 hours PRN Temp greater or equal to 38C (100.4F), Mild Pain (1 - 3)  benzocaine/menthol Lozenge 1 Lozenge Oral every 4 hours PRN Sore Throat  ondansetron Injectable 4 milliGRAM(s) IV Push every 6 hours PRN Nausea and/or Vomiting      T(C): 36.7 (23 @ 05:09), Max: 36.9 (23 @ 21:50)  HR: 96 (23 @ 08:26) (90 - 118)  BP: 143/71 (23 @ 08:26) (131/70 - 147/77)  RR: 17 (23 @ 08:26) (17 - 18)  SpO2: 98% (23 @ 08:26) (95% - 98%)  Wt(kg): --    General:  Constitutional:  Sitting comfortably in NAD.  Ears, Nose, Throat: no abnormalities, mucus membranes moist  Neck:  no lymphadenopathy  Extremities: no edema, clubbing or cyanosis  Skin: no rash     Cognitive:  Oriented to self, place, and month.    Cranial Nerves:  II: BRUNO. III/IV/VI: EOM Full.  mild nystagmus when eye tracking  V1V2V3: Symmetric, VII: Face appears symmetric VIII: Normal to screening, IX/X: Palate Elevates Symmetrical  XI: Trapezius Symmetric  XII: Tongue midline  Motor (limited exam, as patient is on restrains):  Power: power 5/5 distal U/E  Tone: normal x 4 limbs  No tremor  Coordination/Gait:  Finger-nose intact,         Investigations:  CBC Full  -  ( 2023 05:30 )  WBC Count : 15.97 K/uL  RBC Count : 3.18 M/uL  Hemoglobin : 10.5 g/dL  Hematocrit : 32.0 %  Platelet Count - Automated : 78 K/uL  Mean Cell Volume : 100.6 fl  Mean Cell Hemoglobin : 33.0 pg  Mean Cell Hemoglobin Concentration : 32.8 gm/dL  Auto Neutrophil # : x  Auto Lymphocyte # : x  Auto Monocyte # : x  Auto Eosinophil # : x  Auto Basophil # : x  Auto Neutrophil % : x  Auto Lymphocyte % : x  Auto Monocyte % : x  Auto Eosinophil % : x  Auto Basophil % : x    07-    130<L>  |  94<L>  |  11  ----------------------------<  147<H>  3.3<L>   |  24  |  0.52    Ca    8.0<L>      2023 05:30  Phos  1.9     -  Mg     1.8     -    TPro  6.5  /  Alb  3.8  /  TBili  1.4<H>  /  DBili  0.3  /  AST  25  /  ALT  75<H>  /  AlkPhos  64  17    LIVER FUNCTIONS - ( 2023 19:22 )  Alb: 3.8 g/dL / Pro: 6.5 g/dL / ALK PHOS: 64 U/L / ALT: 75 U/L / AST: 25 U/L / GGT: x           PT/INR - ( 2023 14:34 )   PT: 12.8 sec;   INR: 1.07          PTT - ( 2023 14:34 )  PTT:21.0 sec      EE/18 -   1) Mild to moderate generalized   slowing suggestive of a mild to moderate degree of diffuse or multifocal  cerebral dysfunction.  2) Focal slowing over left hemisphere, more prominent in frontotemporal region suggestive of focal cerebral dysfunction  3) Occasional to frequent sharply contoured waves over left frontotemporal region, variable max, indicating underlying hyperexcitability.      Inteval history:  Lactate normalized. Leukocytosis improved, no fevers. Mental status improved. No seizures    ROS  As above, otherwise negative for constitutional/HEENT/CV/pulm/GI//MSK/neuro/derm/endocrine/psych.     MEDICATIONS  (STANDING):  atorvastatin 20 milliGRAM(s) Oral at bedtime  dexAMETHasone  Injectable 4 milliGRAM(s) IV Push every 12 hours  escitalopram 5 milliGRAM(s) Oral daily  fludroCORTISONE 0.2 milliGRAM(s) Oral daily  fluticasone propionate 50 MICROgram(s)/spray Nasal Spray 1 Spray(s) Both Nostrils two times a day  hydrocortisone 1% Cream 1 Application(s) Topical daily  insulin lispro (ADMELOG) corrective regimen sliding scale   SubCutaneous Before meals and at bedtime  levETIRAcetam  IVPB 1000 milliGRAM(s) IV Intermittent every 12 hours  melatonin 5 milliGRAM(s) Oral at bedtime  meropenem  IVPB 2000 milliGRAM(s) IV Intermittent every 8 hours  senna 2 Tablet(s) Oral at bedtime  sodium chloride 3 Gram(s) Oral every 6 hours  sodium chloride 0.9%. 1000 milliLiter(s) (70 mL/Hr) IV Continuous <Continuous>  sucralfate 1 Gram(s) Oral every 12 hours  vancomycin  IVPB 1250 milliGRAM(s) IV Intermittent every 12 hours    MEDICATIONS  (PRN):  acetaminophen     Tablet .. 650 milliGRAM(s) Oral every 6 hours PRN Temp greater or equal to 38C (100.4F), Mild Pain (1 - 3)  benzocaine/menthol Lozenge 1 Lozenge Oral every 4 hours PRN Sore Throat  ondansetron Injectable 4 milliGRAM(s) IV Push every 6 hours PRN Nausea and/or Vomiting      T(C): 36.7 (07-19-23 @ 05:09), Max: 36.9 (07-18-23 @ 21:50)  HR: 96 (07-19-23 @ 08:26) (90 - 118)  BP: 143/71 (07-19-23 @ 08:26) (131/70 - 147/77)  RR: 17 (07-19-23 @ 08:26) (17 - 18)  SpO2: 98% (07-19-23 @ 08:26) (95% - 98%)  Wt(kg): --    General:  Constitutional:  Sitting comfortably in NAD.  Ears, Nose, Throat: no abnormalities, mucus membranes moist  Neck:  no lymphadenopathy  Extremities: no edema, clubbing or cyanosis  Skin: no rash     Cognitive:  Oriented to self, place, and month.    Cranial Nerves:  II: BRUNO. III/IV/VI: EOM Full.  mild nystagmus when eye tracking  V1V2V3: Symmetric, VII: Face appears symmetric VIII: Normal to screening, IX/X: Palate Elevates Symmetrical  XI: Trapezius Symmetric  XII: Tongue midline  Motor (limited exam, as patient is on restrains):  Power: power 5/5 distal U/E  Tone: normal x 4 limbs  No tremor  Coordination/Gait:  Finger-nose intact,         Investigations:  CBC Full  -  ( 19 Jul 2023 05:30 )  WBC Count : 15.97 K/uL  RBC Count : 3.18 M/uL  Hemoglobin : 10.5 g/dL  Hematocrit : 32.0 %  Platelet Count - Automated : 78 K/uL  Mean Cell Volume : 100.6 fl  Mean Cell Hemoglobin : 33.0 pg  Mean Cell Hemoglobin Concentration : 32.8 gm/dL  Auto Neutrophil # : x  Auto Lymphocyte # : x  Auto Monocyte # : x  Auto Eosinophil # : x  Auto Basophil # : x  Auto Neutrophil % : x  Auto Lymphocyte % : x  Auto Monocyte % : x  Auto Eosinophil % : x  Auto Basophil % : x    07-19    130<L>  |  94<L>  |  11  ----------------------------<  147<H>  3.3<L>   |  24  |  0.52    Ca    8.0<L>      19 Jul 2023 05:30  Phos  1.9     07-19  Mg     1.8     07-19    TPro  6.5  /  Alb  3.8  /  TBili  1.4<H>  /  DBili  0.3  /  AST  25  /  ALT  75<H>  /  AlkPhos  64  07-17    LIVER FUNCTIONS - ( 17 Jul 2023 19:22 )  Alb: 3.8 g/dL / Pro: 6.5 g/dL / ALK PHOS: 64 U/L / ALT: 75 U/L / AST: 25 U/L / GGT: x           PT/INR - ( 17 Jul 2023 14:34 )   PT: 12.8 sec;   INR: 1.07          PTT - ( 17 Jul 2023 14:34 )  PTT:21.0 sec        7/18 - 7/19  1) Mild to moderate generalized   slowing suggestive of a mild to moderate degree of diffuse or multifocal  cerebral dysfunction.  2) Focal slowing over left hemisphere, more prominent in frontotemporal region suggestive of focal cerebral dysfunction  3) Occasional to frequent sharply contoured waves over left frontotemporal region, variable max, indicating underlying hyperexcitability.

## 2023-07-19 NOTE — CONSULT NOTE ADULT - SUBJECTIVE AND OBJECTIVE BOX
Patient is a 68y old  Male who presents with a chief complaint of Altered Mental Status & Weakness x2 days (19 Jul 2023 11:11)    HPI:  Osiel Norwood is a 67YO male with a past medical history of type 2 diabetes, hyperlipidemia, iron deficiency anemia, tracheal stenosis s/p tracheostomy (which was closed by ENT 7 days ago); glioblastoma s/p resection 1/27/2022, and Avastin treatment 3/2023 presenting with altered mental status and weakness x2 days. Per family, they last saw him last night around 6pm when he was at baseline. Patient has intermittent expressive aphasia but is getting worse and now is persistent, and has intermittent nausea and vomiting since last night. Patient was supposed to receive an outpatient brain MRI on 7/25/2023.  Stroke code performed in  is negative for CVA. Head CT shows new hyperdensity in frontal parietal - surgical site. MRI is ordered to rule out tumor recurrence.   Per daughter at bedside, Entresto and Eliquis were stopped per his PC - cardiologist.      (07 Jul 2023 13:10)      REVIEW OF SYSTEMS  All review of systems negative, except for those marked:  General:		[] Abnormal:  	                                [] Night Sweats		[] Fever		[] Weight Loss  Pulmonary/Cough:	[] Abnormal:  Cardiac/Chest Pain:	[] Abnormal:  Gastrointestinal:   	[] Abnormal:  Eyes:			        [] Abnormal:  ENT:		         	[] Abnormal:  Dysuria:		                [] Abnormal:  Musculoskeletal	:	[] Abnormal:  Endocrine:		        [] Abnormal:  Lymph Nodes:		[] Abnormal:  Headache:		        [] Abnormal:  Skin:			        [] Abnormal:  Allergy/Immune:       	[] Abnormal:  Psychiatric:		        [] Abnormal:  [] All other review of systems negative  [] Unable to obtain (explain):    Recent Ill Contacts:	[] No	[] Yes:  Recent Travel History:	[] No	[] Yes:  Recent Animal/Insect Exposure/Tick Bites:	[] No	[] Yes:    Allergies    amoxicillin (Rash)    Intolerances      Antimicrobials:  meropenem  IVPB 2000 milliGRAM(s) IV Intermittent every 8 hours  vancomycin  IVPB 1250 milliGRAM(s) IV Intermittent every 12 hours      Other Medications:  acetaminophen     Tablet .. 650 milliGRAM(s) Oral every 6 hours PRN  atorvastatin 20 milliGRAM(s) Oral at bedtime  benzocaine/menthol Lozenge 1 Lozenge Oral every 4 hours PRN  dexAMETHasone  Injectable 4 milliGRAM(s) IV Push every 12 hours  escitalopram 5 milliGRAM(s) Oral daily  fludroCORTISONE 0.2 milliGRAM(s) Oral daily  fluticasone propionate 50 MICROgram(s)/spray Nasal Spray 1 Spray(s) Both Nostrils two times a day  hydrocortisone 1% Cream 1 Application(s) Topical daily  insulin lispro (ADMELOG) corrective regimen sliding scale   SubCutaneous Before meals and at bedtime  levETIRAcetam  IVPB 1000 milliGRAM(s) IV Intermittent every 12 hours  melatonin 5 milliGRAM(s) Oral at bedtime  ondansetron Injectable 4 milliGRAM(s) IV Push every 6 hours PRN  potassium chloride   Powder 40 milliEquivalent(s) Oral every 6 hours  potassium phosphate IVPB 30 milliMole(s) IV Intermittent once  senna 2 Tablet(s) Oral at bedtime  sodium chloride 3 Gram(s) Oral every 6 hours  sucralfate 1 Gram(s) Oral every 12 hours      FAMILY HISTORY:  FH: diabetes mellitus (Father, Mother)    FH: hyperlipidemia (Father)    FH: hypertension (Father, Mother)      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      Type 2 diabetes mellitus      Hyperlipidemia      Iron deficiency anemia      Nephrolithiasis      Thrombocytopenia      Hyponatremia      BPH (benign prostatic hyperplasia)      S/P craniotomy      H/O tracheostomy        SOCIAL HISTORY:    IMMUNIZATIONS  [] Up to Date		[] Not Up to Date:  Recent Immunizations:	[] No	[] Yes:    Daily     Daily   Head Circumference:  Vital Signs Last 24 Hrs  T(C): 36.7 (19 Jul 2023 09:00), Max: 36.9 (18 Jul 2023 21:50)  T(F): 98.1 (19 Jul 2023 09:00), Max: 98.4 (18 Jul 2023 21:50)  HR: 98 (19 Jul 2023 11:34) (90 - 118)  BP: 153/83 (19 Jul 2023 11:34) (131/70 - 153/83)  BP(mean): 112 (19 Jul 2023 11:34) (94 - 112)  RR: 17 (19 Jul 2023 11:34) (17 - 18)  SpO2: 96% (19 Jul 2023 11:34) (95% - 98%)    Parameters below as of 19 Jul 2023 11:34  Patient On (Oxygen Delivery Method): nasal cannula        PHYSICAL EXAM  Constitutional: resting comfortably in bed; NAD  HEENT: PERRL, EOMI, no nasal discharge; no oral lesions; no sinus tenderness on percussion  Neck: supple; no JVD or lymphadenopathy  Respiratory: CTA B/L; no W/R/R, no retractions  Cardiac: +S1/S2; RRR; no M/R/G  Gastrointestinal: soft, NT/ND; no rebound or guarding; +BSx4  Extremities: WWP, no clubbing or cyanosis; no peripheral edema  Dermatologic: skin warm, dry and intact; no rashes, wounds, or scars  Neurologic: AAOx3; no focal deficits    Respiratory Support:		[] No	[] Yes:  Vasoactive medication infusion:	[] No	[] Yes:  Venous catheters:		[] No	[] Yes:  Bladder catheter:		        [] No	[] Yes:  Other catheters or tubes:	[] No	[] Yes:    Lab Results:                        10.5   15.97 )-----------( 78       ( 19 Jul 2023 05:30 )             32.0     07-19    130<L>  |  94<L>  |  11  ----------------------------<  147<H>  3.3<L>   |  24  |  0.52    Ca    8.0<L>      19 Jul 2023 05:30  Phos  1.9     07-19  Mg     1.8     07-19    TPro  6.5  /  Alb  3.8  /  TBili  1.4<H>  /  DBili  0.3  /  AST  25  /  ALT  75<H>  /  AlkPhos  64  07-17    LIVER FUNCTIONS - ( 17 Jul 2023 19:22 )  Alb: 3.8 g/dL / Pro: 6.5 g/dL / ALK PHOS: 64 U/L / ALT: 75 U/L / AST: 25 U/L / GGT: x           PT/INR - ( 17 Jul 2023 14:34 )   PT: 12.8 sec;   INR: 1.07          PTT - ( 17 Jul 2023 14:34 )  PTT:21.0 sec  Urinalysis Basic - ( 19 Jul 2023 05:30 )    Color: x / Appearance: x / SG: x / pH: x  Gluc: 147 mg/dL / Ketone: x  / Bili: x / Urobili: x   Blood: x / Protein: x / Nitrite: x   Leuk Esterase: x / RBC: x / WBC x   Sq Epi: x / Non Sq Epi: x / Bacteria: x        MICROBIOLOGY   Patient is a 68y old  Male who presents with a chief complaint of Altered Mental Status & Weakness x2 days (19 Jul 2023 11:11)    HPI:  Osiel Norwood is a 67YO male with a past medical history of type 2 diabetes, hyperlipidemia, iron deficiency anemia, tracheal stenosis s/p tracheostomy (which was closed by ENT 7 days ago); glioblastoma s/p resection 1/27/2022, and Avastin treatment 3/2023 presenting with altered mental status and weakness x2 days. Per family, they last saw him last night around 6pm when he was at baseline. Patient has intermittent expressive aphasia but is getting worse and now is persistent, and has intermittent nausea and vomiting since last night. Patient was supposed to receive an outpatient brain MRI on 7/25/2023.  Stroke code performed in  is negative for CVA. Head CT shows new hyperdensity in frontal parietal - surgical site. MRI is ordered to rule out tumor recurrence.   Per daughter at bedside, Entresto and Eliquis were stopped per his PC - cardiologist.      (07 Jul 2023 13:10)      REVIEW OF SYSTEMS  All review of systems negative, except for those marked:  General:		[] Abnormal:  	                        [] Night Sweats		[] Fever		[] Weight Loss  Pulmonary/Cough:	[] Abnormal:  Cardiac/Chest Pain:	[] Abnormal:  Gastrointestinal:   	[] Abnormal:  Eyes:			[] Abnormal:  ENT:		         	[] Abnormal:  Dysuria:		            [] Abnormal:  Musculoskeletal	:	[] Abnormal:  Endocrine:		[] Abnormal:  Lymph Nodes:		[] Abnormal:  Headache:		[] Abnormal:  Skin:			[] Abnormal:  Allergy/Immune:       	[] Abnormal:  Psychiatric:		[] Abnormal:  [x] All other review of systems negative  [] Unable to obtain (explain):        Allergies    amoxicillin (Rash)    Intolerances      Antimicrobials:  meropenem  IVPB 2000 milliGRAM(s) IV Intermittent every 8 hours  vancomycin  IVPB 1250 milliGRAM(s) IV Intermittent every 12 hours      Other Medications:  acetaminophen     Tablet .. 650 milliGRAM(s) Oral every 6 hours PRN  atorvastatin 20 milliGRAM(s) Oral at bedtime  benzocaine/menthol Lozenge 1 Lozenge Oral every 4 hours PRN  dexAMETHasone  Injectable 4 milliGRAM(s) IV Push every 12 hours  escitalopram 5 milliGRAM(s) Oral daily  fludroCORTISONE 0.2 milliGRAM(s) Oral daily  fluticasone propionate 50 MICROgram(s)/spray Nasal Spray 1 Spray(s) Both Nostrils two times a day  hydrocortisone 1% Cream 1 Application(s) Topical daily  insulin lispro (ADMELOG) corrective regimen sliding scale   SubCutaneous Before meals and at bedtime  levETIRAcetam  IVPB 1000 milliGRAM(s) IV Intermittent every 12 hours  melatonin 5 milliGRAM(s) Oral at bedtime  ondansetron Injectable 4 milliGRAM(s) IV Push every 6 hours PRN  potassium chloride   Powder 40 milliEquivalent(s) Oral every 6 hours  potassium phosphate IVPB 30 milliMole(s) IV Intermittent once  senna 2 Tablet(s) Oral at bedtime  sodium chloride 3 Gram(s) Oral every 6 hours  sucralfate 1 Gram(s) Oral every 12 hours      FAMILY HISTORY:  FH: diabetes mellitus (Father, Mother)    FH: hyperlipidemia (Father)    FH: hypertension (Father, Mother)      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      Type 2 diabetes mellitus      Hyperlipidemia      Iron deficiency anemia      Nephrolithiasis      Thrombocytopenia      Hyponatremia      BPH (benign prostatic hyperplasia)      S/P craniotomy      H/O tracheostomy            Daily     Daily   Head Circumference:  Vital Signs Last 24 Hrs  T(C): 36.7 (19 Jul 2023 09:00), Max: 36.9 (18 Jul 2023 21:50)  T(F): 98.1 (19 Jul 2023 09:00), Max: 98.4 (18 Jul 2023 21:50)  HR: 98 (19 Jul 2023 11:34) (90 - 118)  BP: 153/83 (19 Jul 2023 11:34) (131/70 - 153/83)  BP(mean): 112 (19 Jul 2023 11:34) (94 - 112)  RR: 17 (19 Jul 2023 11:34) (17 - 18)  SpO2: 96% (19 Jul 2023 11:34) (95% - 98%)    Parameters below as of 19 Jul 2023 11:34  Patient On (Oxygen Delivery Method): nasal cannula        PHYSICAL EXAM  Constitutional: resting comfortably in bed; NAD  HEENT: PERRL, EOMI, no nasal discharge; no oral lesions; no sinus tenderness on percussion  Neck: supple; no JVD or lymphadenopathy  Respiratory: CTA B/L; no W/R/R, no retractions  Cardiac: +S1/S2; RRR; no M/R/G  Gastrointestinal: soft, NT/ND; no rebound or guarding; +BSx4  Extremities: WWP, no clubbing or cyanosis; no peripheral edema  Dermatologic: skin warm, dry and intact; no rashes, wounds, or scars  Neurologic: AAOx2; slowed speech    Respiratory Support:		[] No	[x] Yes: NC  Vasoactive medication infusion:	[x] No	[] Yes:  Venous catheters:		[x] No	[] Yes:  Bladder catheter:		[x] No	[] Yes:  Other catheters or tubes:	[x] No	[] Yes:    Lab Results:                        10.5   15.97 )-----------( 78       ( 19 Jul 2023 05:30 )             32.0     07-19    130<L>  |  94<L>  |  11  ----------------------------<  147<H>  3.3<L>   |  24  |  0.52    Ca    8.0<L>      19 Jul 2023 05:30  Phos  1.9     07-19  Mg     1.8     07-19    TPro  6.5  /  Alb  3.8  /  TBili  1.4<H>  /  DBili  0.3  /  AST  25  /  ALT  75<H>  /  AlkPhos  64  07-17    LIVER FUNCTIONS - ( 17 Jul 2023 19:22 )  Alb: 3.8 g/dL / Pro: 6.5 g/dL / ALK PHOS: 64 U/L / ALT: 75 U/L / AST: 25 U/L / GGT: x           PT/INR - ( 17 Jul 2023 14:34 )   PT: 12.8 sec;   INR: 1.07          PTT - ( 17 Jul 2023 14:34 )  PTT:21.0 sec  Urinalysis Basic - ( 19 Jul 2023 05:30 )    Color: x / Appearance: x / SG: x / pH: x  Gluc: 147 mg/dL / Ketone: x  / Bili: x / Urobili: x   Blood: x / Protein: x / Nitrite: x   Leuk Esterase: x / RBC: x / WBC x   Sq Epi: x / Non Sq Epi: x / Bacteria: x        MICROBIOLOGY

## 2023-07-20 NOTE — PROGRESS NOTE ADULT - SUBJECTIVE AND OBJECTIVE BOX
Inteval history:    No events overnight. Pt no longer on restrains.     ROS  As above, otherwise negative for constitutional/HEENT/CV/pulm/GI//MSK/neuro/derm/endocrine/psych.     MEDICATIONS  (STANDING):  atorvastatin 20 milliGRAM(s) Oral at bedtime  dexAMETHasone  Injectable 4 milliGRAM(s) IV Push every 12 hours  escitalopram 5 milliGRAM(s) Oral daily  fludroCORTISONE 0.1 milliGRAM(s) Oral daily  fluticasone propionate 50 MICROgram(s)/spray Nasal Spray 1 Spray(s) Both Nostrils two times a day  hydrocortisone 1% Cream 1 Application(s) Topical daily  insulin lispro (ADMELOG) corrective regimen sliding scale   SubCutaneous Before meals and at bedtime  levETIRAcetam  IVPB 1000 milliGRAM(s) IV Intermittent every 12 hours  melatonin 5 milliGRAM(s) Oral at bedtime  senna 2 Tablet(s) Oral at bedtime  sodium chloride 3 Gram(s) Oral every 6 hours  sodium phosphate 30 milliMole(s)/500 mL IVPB 30 milliMole(s) IV Intermittent once  sucralfate 1 Gram(s) Oral every 12 hours    MEDICATIONS  (PRN):  acetaminophen     Tablet .. 650 milliGRAM(s) Oral every 6 hours PRN Temp greater or equal to 38C (100.4F), Mild Pain (1 - 3)  benzocaine/menthol Lozenge 1 Lozenge Oral every 4 hours PRN Sore Throat  ondansetron Injectable 4 milliGRAM(s) IV Push every 6 hours PRN Nausea and/or Vomiting      T(C): 36.7 (07-20-23 @ 09:19), Max: 37.1 (07-20-23 @ 05:00)  HR: 92 (07-20-23 @ 08:50) (88 - 100)  BP: 119/68 (07-20-23 @ 08:50) (119/68 - 153/83)  RR: 16 (07-20-23 @ 08:50) (16 - 18)  SpO2: 92% (07-20-23 @ 08:50) (92% - 100%)  Wt(kg): --    General:  Constitutional:  Sitting comfortably in NAD.  Ears, Nose, Throat: no abnormalities, mucus membranes moist  Neck: supple, no lymphadenopathy  Extremities: no edema, clubbing or cyanosis  Skin: no rash or neurocutaneous signs     Cognitive:  Orientation, to self and place.     Cranial Nerves:  II: BRUNO. III/IV/VI: EOM Full.  mild nystagmus when eye tracking  V1V2V3: Symmetric, VII: Face appears symmetric VIII: Normal to screening, IX/X: Palate Elevates Symmetrical  XI: Trapezius Symmetric  XII: Tongue midline  Motor (limited exam):  Power: power 5/5 distal U/E  Tone: normal x 4 limbs  No tremor  Coordination/Gait:  Finger-nose intact,         Investigations:  CBC Full  -  ( 20 Jul 2023 05:30 )  WBC Count : 14.35 K/uL  RBC Count : 3.37 M/uL  Hemoglobin : 11.1 g/dL  Hematocrit : 32.7 %  Platelet Count - Automated : 72 K/uL  Mean Cell Volume : 97.0 fl  Mean Cell Hemoglobin : 32.9 pg  Mean Cell Hemoglobin Concentration : 33.9 gm/dL  Auto Neutrophil # : x  Auto Lymphocyte # : x  Auto Monocyte # : x  Auto Eosinophil # : x  Auto Basophil # : x  Auto Neutrophil % : x  Auto Lymphocyte % : x  Auto Monocyte % : x  Auto Eosinophil % : x  Auto Basophil % : x    07-20    129<L>  |  91<L>  |  12  ----------------------------<  197<H>  4.4   |  28  |  0.57    Ca    9.1      20 Jul 2023 05:30  Phos  1.6     07-20  Mg     1.8     07-20              EEG:  Daily Summary (7/19):    1) Mild to moderate generalized   slowing suggestive of a mild to moderate degree of diffuse or multifocal  cerebral dysfunction.  2) Focal slowing over left hemisphere, more prominent in frontotemporal region suggestive of focal cerebral dysfunction  3) Occasional to frequent sharply contoured waves over left frontotemporal region, variable max, indicating underlying hyperexcitability.

## 2023-07-20 NOTE — CHART NOTE - NSCHARTNOTEFT_GEN_A_CORE
Off EEG, neuro exam improved. ID following for concern for sepsis, likely aerobic blood cx bottle staph epi contaminant, cont monitoring off of abx, possible drug reaction as cause. F/u surveillance blood cx. Hyponatremia, repeat Na 129 overnight from 130, cont current regimen and f/u am labs and nephrology recs. AM na 129 continuing fluid restriction.

## 2023-07-20 NOTE — PROGRESS NOTE ADULT - ASSESSMENT
***INCOMPLETE******    Assessment:  67 y/o male with a past medical history of type 2 diabetes, hyperlipidemia, iron deficiency anemia, tracheal stenosis s/p tracheostomy (which was closed by ENT 7 days ago); glioblastoma s/p resection 1/27/2022, and Avastin treatment 3/2023 presenting with expressive aphasia, nausea and vomiting, and overall deterioration of his mental status since last May per family.  CTH from this admission shows new hyperdensity in left frontal parietal - surgical site c/f disease progression.  Patient was clinically stable for DC then developed acute deterioration on 7/17. Patient complained of a severe frontal headache, accompanied by bilateral arm jerks facial twitching, and difficulty breathing. He developed a fever, worsening leukocytosis, increasing lactate, and SVT. Infectious etiology suspected, so he was started on vanco and Jone empirically. No clear source identified.      Given the patient's history seizures can't be completely ruled out as a contributing factor of his current presentation, however lab results like his lactate cannot be attributed to seizure activity in the absence of a full tonic-clonic event. Difficult to establish is there is neck rigidity on exam as the patient has difficulties following commands. Pt was started on Keppra prophylactically after his GBM resection in 2022. His dose was increased to 750mg BID last year after the pt's family noticed occasional arm jerking movements. vEEG did not capture any seizures but there are indications of underlying hyperexcitability. Pt has several risk factors for possible breakthrough seizure given the progression of his disease?, his acute worsening and Meropenem therapy.  No seizure activity observed on vEEG. Patient medically stable at this time.       Recommendations:   - c/w Keppra 1000mg BID  - pt stable from an neurological standpoint  - Epilepsy signing off.     Please call us if any questions or neurological changes.    Assessment:  69 y/o male with a past medical history of type 2 diabetes, hyperlipidemia, iron deficiency anemia, tracheal stenosis s/p tracheostomy (which was closed by ENT 7 days ago); glioblastoma s/p resection 1/27/2022, and Avastin treatment 3/2023 presenting with expressive aphasia, nausea and vomiting, and overall deterioration of his mental status since last May per family.  CTH from this admission shows new hyperdensity in left frontal parietal - surgical site c/f disease progression.  Patient was clinically stable for DC then developed acute deterioration on 7/17. Patient complained of a severe frontal headache, accompanied by bilateral arm jerks facial twitching, and difficulty breathing. He developed a fever, worsening leukocytosis, increasing lactate, and SVT. Infectious etiology suspected, so he was started on vanco and Jone empirically. No clear source identified.      Given the patient's history seizures can't be completely ruled out as a contributing factor of his current presentation, however lab results like his lactate cannot be attributed to seizure activity in the absence of a full tonic-clonic event. Difficult to establish is there is neck rigidity on exam as the patient has difficulties following commands. Pt was started on Keppra prophylactically after his GBM resection in 2022. His dose was increased to 750mg BID last year after the pt's family noticed occasional arm jerking movements. vEEG did not capture any seizures but there are indications of underlying hyperexcitability. Pt has several risk factors for possible breakthrough seizure given the progression of his disease?, his acute worsening and Meropenem therapy.  No seizure activity observed on vEEG. Patient medically stable at this time.       Recommendations:   - c/w Keppra 1000mg BID  - pt stable from an neurological standpoint  - Epilepsy signing off.     Please call us if any questions or neurological changes.

## 2023-07-20 NOTE — PROGRESS NOTE ADULT - SUBJECTIVE AND OBJECTIVE BOX
HPI:  Osiel Norwood is a 69YO male with a past medical history of type 2 diabetes, hyperlipidemia, iron deficiency anemia, tracheal stenosis s/p tracheostomy (which was closed by ENT 7 days ago); glioblastoma s/p resection 1/27/2022, and Avastin treatment 3/2023 presenting with altered mental status and weakness x2 days. Per family, they last saw him last night around 6pm when he was at baseline. Patient has intermittent expressive aphasia but is getting worse and now is persistent, and has intermittent nausea and vomiting since last night. Patient was supposed to receive an outpatient brain MRI on 7/25/2023.  Stroke code performed in  is negative for CVA. Head CT shows new hyperdensity in frontal parietal - surgical site. MRI is ordered to rule out tumor recurrence.   Per daughter at bedside, Entresto and Eliquis were stopped per his PC - cardiologist.     HOSPITAL COURSE:   7/7: Admitted. Na 115 in ED with repeat 118, started 3% at 30cc/hr, and salt tabs 3q6, stability CTH in ED showing Interval development of a 13 mm hyperdense nodule along the   inferior margin of the resection cavity which may represent progression   of disease with hemorrhage, repeat CTH stable, urine studies ordered  7/8: Neuro stable, 12AM BMP Na114 3% increased to 40cc/hr, urine studies, pancx to r/o infection since pt is immunocompromised. Changed pantoprazole to sucralfate for GI ppx d/t thrombocytopenia. LE dopplers negative. DC'd 3%. ENT consulted to assess stoma, recommend follow up upon discharge with ENT, Repeat sodium 122. Restarted 3% at 30.  7/9: ANA LILIA o/n neuro stable. remains on 3%@30cc/hr. Started florinef, increased 3% to 50cc/hr. Increased 3% to 75cc/hr.    7/10: continued current 3% rate o/n. Na 127 --> 125, cont 3% @75cc/hr, f/u repeat Na this evening, likely stepdown tomorrow. Pend dispo home with home PT/OT when able. Heme consulted for thrombocytopenia  7/11: ANA LILIA o/n. Remains on 3% @75cc/hr. Decreased 3% to 50cc/hr. Started 1.2L fluid restriction. Repeat Na corrected 128  7/12: ANA LILIA overnight, remains on 3%, SDU from ICU  7/13: ANA LILIA overnight, neuro stable, on 3%@50, Am sodium 137, decreased 3% to 25cc/hr, repeat Na 138, decreased to 15cc/hr.  7/14: ANA LILIA overnight, neuro stable, remains on 3%@15cc/hr  7/15: ANA LILIA overnight. Neuro exam stable. Pt remains on 3% saline.Sodium 134 this AM remains on 3%@25. Per nephrology recs hypertonic Na d'ceed, Na tabs 2q6, URENA 15g BID, cont florinef/ fluid restriction.Per nephrology Na >130 ok when ready for d/c   7/16: ANA LILIA overnight, hypertonics d/c now on urea powder/1L fluid restriction/florinef and salt tabs per renal, hydrocortisone cream ordered for rash on back, rec for Home PT  7/17: ANA LILIA overnight, following Na, renal following, ENT saw for hoarse voice rec followup with CT surgery for possible dilation, pending Home PT. Patient developed sudden onset severe headache. Hyperventilating with increased work of breathing. Wheezing in all lung fields to auscultation. Neuro intact on exam. Duoneb x1 ordered. Dilaudid 0.25 IV given for pain control. Subsequently patient developed nausea with multiple episodes of vomiting. Hypertensive with SBP in the 190s. Given hydralazine 10mg IVP, Zofran. Stroke code and rapid response called. STAT labs sent. CTH/CTA/CTP completed. Tachycardic to the 180s, consistent with SVT, remained hypertensive. Given 10 of labetalol, SVT broke. Transferred to Telemetry. WBC 23.43, lactate 8.5. Given 1L fluid bolus. Pan culture sent. Started empirically on Vanc/Meropenem.   7/18: Put on eeg. Voiding while retaining urine, SC for 500cc. EEG +spikes, epilepsy consulted. CTA chest and CT A/P pending. Keppra increased 1g BID.   7/19: Cont EEG, trend Na, possible NGT today if not able to tolerate PO. Blood cx growing GS + cocci in clusters, f/u MRSA swab and pan cx, cont meropenem and vancomycin. Vanc trough in am. F/u urine studies.   7/20: Off EEG, neuro exam improved. ID following for concern for sepsis, likely aerobic blood cx bottle staph epi contaminant, cont monitoring off of abx, possible drug reaction as cause. F/u surveillance blood cx. Hyponatremia, repeat Na 129 overnight from 130, cont current regimen and f/u am labs and nephrology recs.       OVERNIGHT EVENTS:  Vital Signs Last 24 Hrs  T(C): 37 (19 Jul 2023 22:18), Max: 37 (19 Jul 2023 22:18)  T(F): 98.6 (19 Jul 2023 22:18), Max: 98.6 (19 Jul 2023 22:18)  HR: 94 (20 Jul 2023 00:43) (88 - 100)  BP: 136/70 (20 Jul 2023 00:43) (136/70 - 153/83)  BP(mean): 95 (20 Jul 2023 00:43) (95 - 112)  RR: 18 (20 Jul 2023 00:43) (17 - 18)  SpO2: 94% (20 Jul 2023 00:43) (94% - 100%)    Parameters below as of 20 Jul 2023 00:43  Patient On (Oxygen Delivery Method): room air        I&O's Summary    18 Jul 2023 07:01  -  19 Jul 2023 07:00  --------------------------------------------------------  IN: 1830 mL / OUT: 2075 mL / NET: -245 mL    19 Jul 2023 07:01  -  20 Jul 2023 03:34  --------------------------------------------------------  IN: 470 mL / OUT: 1950 mL / NET: -1480 mL        PHYSICAL EXAM:  General: NAD, pt is comfortably sitting up in bed, A&O x 2-3 (Slovak), on RA  HEENT: CN II-XII grossly intact, PERRL 3mm, EOMI b/l, face symmetric  Cardiovascular: RRR, normal S1 and S2   Respiratory: lungs CTAB, no wheezing, rhonchi, or crackles   GI: normoactive BS to auscultation, abd soft, NTND   Neuro: no aphasia, speech clear, no dysmetria, no pronator drift  strength 5/5 throughout all 4 extremities  sensation intact to light touch throughout   Extremities: distal pulses 2+ x4     TUBES/LINES:  [] Campuzano  [] Lumbar Drain  [] Wound Drains  [] Others    DIET:  [X] NPO  [] Mechanical  [] Tube feeds    LABS:                        10.5   15.97 )-----------( 78       ( 19 Jul 2023 05:30 )             32.0     07-19    129<L>  |  95<L>  |  10  ----------------------------<  180<H>  4.8   |  25  |  0.58    Ca    8.4      19 Jul 2023 20:07  Phos  1.9     07-19  Mg     1.8     07-19        Urinalysis Basic - ( 19 Jul 2023 20:07 )    Color: x / Appearance: x / SG: x / pH: x  Gluc: 180 mg/dL / Ketone: x  / Bili: x / Urobili: x   Blood: x / Protein: x / Nitrite: x   Leuk Esterase: x / RBC: x / WBC x   Sq Epi: x / Non Sq Epi: x / Bacteria: x          CAPILLARY BLOOD GLUCOSE      POCT Blood Glucose.: 208 mg/dL (19 Jul 2023 21:22)  POCT Blood Glucose.: 134 mg/dL (19 Jul 2023 16:49)  POCT Blood Glucose.: 200 mg/dL (19 Jul 2023 12:14)  POCT Blood Glucose.: 143 mg/dL (19 Jul 2023 06:41)      Drug Levels: [] N/A  Vancomycin Level, Trough: 5.9 ug/mL (07-19 @ 05:30)    CSF Analysis: [] N/A      Allergies    amoxicillin (Rash)    Intolerances      MEDICATIONS:  Antibiotics:    Neuro:  acetaminophen     Tablet .. 650 milliGRAM(s) Oral every 6 hours PRN  escitalopram 5 milliGRAM(s) Oral daily  levETIRAcetam  IVPB 1000 milliGRAM(s) IV Intermittent every 12 hours  melatonin 5 milliGRAM(s) Oral at bedtime  ondansetron Injectable 4 milliGRAM(s) IV Push every 6 hours PRN    Anticoagulation:    OTHER:  atorvastatin 20 milliGRAM(s) Oral at bedtime  benzocaine/menthol Lozenge 1 Lozenge Oral every 4 hours PRN  dexAMETHasone  Injectable 4 milliGRAM(s) IV Push every 12 hours  fludroCORTISONE 0.1 milliGRAM(s) Oral daily  fluticasone propionate 50 MICROgram(s)/spray Nasal Spray 1 Spray(s) Both Nostrils two times a day  hydrocortisone 1% Cream 1 Application(s) Topical daily  insulin lispro (ADMELOG) corrective regimen sliding scale   SubCutaneous Before meals and at bedtime  senna 2 Tablet(s) Oral at bedtime  sucralfate 1 Gram(s) Oral every 12 hours    IVF:  sodium chloride 3 Gram(s) Oral every 6 hours    CULTURES:  Culture Results:   Growth in aerobic bottle: Staphylococcus epidermidis  Susceptibility to follow. (07-17 @ 18:48)  Culture Results:   Growth in aerobic bottle: Staphylococcus epidermidis  Susceptibility to follow. (07-17 @ 18:48)    RADIOLOGY & ADDITIONAL TESTS:      ASSESSMENT:  Pt is a 69YO male with a past medical history of type 2 diabetes, hyperlipidemia, iron deficiency anemia, tracheal stenosis s/p tracheostomy (which was closed by ENT 7 days ago); glioblastoma s/p resection 1/27/2022, and Avastin treatment 3/2023 presenting with expressive aphasia, nuasea and vomiting x1 day. CT shows new hyperdensity in left frontal parietal - surgical site c/f disease progression. Na in the . Hospital course complicated by concern for possible sepsis, ID following now off abx, hemodynamically stable.       ALTERED MENTAL STATUS;BRAIN MASS    Abnormal electrocardiogram [ECG] [EKG]    Allergy, unspecified, initial encounter    Anemia, unspecified    Benign prostatic hyperplasia without lower urinary tract symptoms    Calculus of kidney    Cough, unspecified    Depression, unspecified    ENCOUNTER FOR ATTENT    Encounter for general adult medical examination without abnormal findings    Encounter for immunization    Encounter for immunization safety counseling    Encounter for other preprocedural examination    Encounter for screening for malignant neoplasm of prostate    Encounter for screening for other metabolic disorders    Encounter for screening for other viral diseases    ESSENTIAL (PRIMARY)    Gastro-esophageal reflux disease without esophagitis    Generalized anxiety disorder    History of Glioblastoma    Hyperlipidemia, unspecified    Hypo-osmolality and hyponatremia    Hypotension, unspecified    Iron deficiency    Iron deficiency anemia, unspecified    LONG TERM (CURRENT)    LONG TERM (CURRENT)    LONG TERM (CURRENT)    Malignant neoplasm of brain, unspecified    Nausea with vomiting, unspecified    Other constipation    Other fatigue    Other injury of unspecified body region, initial encounter    OTHER SPECIFIED DISE    Personal history of malignant neoplasm, unspecified    Personal history of other diseases of the nervous system and sense organs    Personal history of other endocrine, nutritional and metabolic disease    Personal history of other mental and behavioral disorders    Personal history of transient ischemic attack (TIA), and cerebral infarction without residual deficits    POSTPROCEDURAL SUBGL    Pruritus, unspecified    Shortness of breath    Tachycardia, unspecified    Thrombocytopenia, unspecified    Unspecified abdominal pain    Unspecified visual disturbance    Urinary calculus, unspecified    Vitamin D deficiency, unspecified    No pertinent family history in first degree relatives    FH: diabetes mellitus (Father, Mother)    FH: hyperlipidemia (Father)    FH: hypertension (Father, Mother)    Handoff    MEWS Score    No pertinent past medical history    Diabetes mellitus    Hypertension    Glioblastoma    Type 2 diabetes mellitus    Hyperlipidemia    Iron deficiency anemia    Nephrolithiasis    Thrombocytopenia    Hyponatremia    BPH (benign prostatic hyperplasia)    No pertinent past medical history    Altered mental status    Hyponatremia    Hypertension    Glioblastoma    Type 2 diabetes mellitus    Hyperlipidemia    Iron deficiency anemia    Thrombocytopenia    BPH (benign prostatic hyperplasia)    Hyponatremia    Encounter for palliative care    Advance care planning    Functional quadriplegia    At risk for altered mental status    Altered mental status    No significant past surgical history    No significant past surgical history    S/P craniotomy    H/O tracheostomy    No significant past surgical history    MED EVAL    Type 2 diabetes mellitus    BPH (benign prostatic hyperplasia)    90+    Room Service Assist    Brain mass    Hypertension    Glioblastoma    Type 2 diabetes mellitus    Hyperlipidemia    Iron deficiency anemia    Thrombocytopenia    BPH (benign prostatic hyperplasia)    Tracheal stenosis    [email or web address]        PLAN:  Neuro:  -neuro q4/vitals q4hrs   -CTH 7/7: Interval development of a 13 mm hyperdense nodule along the   inferior margin of the resection cavity which may represent progression   of disease with hemorrhage. Repeat CTH stable, CTH 7/17 stable   -CTA 7/7: negative, CTA 7/17 stable  -CTP 7/17 stable.   -pain control prn  -Keppra increased to 1g BID  -Decadron 4mg BID  -c/w home Lexapro  -home ASA 81 held i/s/o thrombocytopenia   -MRI suspicious for recurrent tumor and hydro   -EEG 7/17: Occasional to frequent sharply contoured waves over left frontotemporal region d/c 7/19  -epilepsy consulted     Pulm:  -satting well on RA    Cardio:  --160    GI:  -NPO for now, when taking PO, resume CCD with 1L fluid restriction  -bowel regimen  -sucralfate  -last BM 7/15    Renal:  -hyponatremic, Na 118 on admission  - off NS for pulm edema on CXR;off hypertonics since 7/15  -florinef 0.1mg daily, salt tabs 3q6  -voiding, sc prn   -dc'd urea powder 15g BID started per Renal d/t petechial rash     Endo:  -ISS  -A1c 5.7  -h/o T2DM: home Januvia held  -h/o HLD: c/w Atorvastatin 20mg    Heme:  -SCDs for DVT ppx, SQL held for thrombocytopenia   -heme consulted for thrombocytopenia- w/u sent, likely chronic thrombocytopenia d/t chemotherapy agent in past   -LE dopplers 7/8 negative  -CTA PE protocol for tachycardia: negative     ID:  -pan cx 7/7; aerobic bottles growing GS+ cocci in clusters   -vancomycin/meropenem empirically (7/17-19), d/c per ID and repeat blood cx 7/19    Dispo: regional status, full code, Home PT    D/w Dr. Dorado

## 2023-07-20 NOTE — PROGRESS NOTE ADULT - SUBJECTIVE AND OBJECTIVE BOX
Patient is a 68y old  Male who presents with a chief complaint of Altered Mental Status & Weakness x2 days (20 Jul 2023 13:37)      SUBJECTIVE:  Patient seen and examined at bedside.  Sitting in bedside chair.  Reports no headache this AM, denies fever, chills, CP, N/V, abdominal pain    ROS:  Denies cough, SOB, chest pain, Abdominal pain, N/V.  All other ROS negative except as above    Vital Signs Last 12 Hrs  T(F): 97.4 (07-20-23 @ 14:07), Max: 98.8 (07-20-23 @ 05:00)  HR: 82 (07-20-23 @ 11:29) (82 - 94)  BP: 130/78 (07-20-23 @ 11:29) (119/68 - 130/78)  BP(mean): 98 (07-20-23 @ 11:29) (87 - 98)  RR: 17 (07-20-23 @ 11:29) (16 - 18)  SpO2: 94% (07-20-23 @ 11:29) (92% - 94%)  I&O's Summary    19 Jul 2023 07:01  -  20 Jul 2023 07:00  --------------------------------------------------------  IN: 470 mL / OUT: 2250 mL / NET: -1780 mL    20 Jul 2023 07:01  -  20 Jul 2023 15:18  --------------------------------------------------------  IN: 240 mL / OUT: 500 mL / NET: -260 mL        PHYSICAL EXAM:  Constitutional: NAD, sitting comfortably in bedside chair  HEENT: PERRLA, EOMI, MMM  Neck: Supple  Respiratory: CTA B/L. No w/r/r.   Cardiovascular: RRR, regular, normal S1 and S2, no m/r/g.   Gastrointestinal: +BS, soft NTND  Extremities: wwp; no cyanosis, clubbing or edema.   Vascular: Pulses equal and strong throughout.   Neurological: A&Ox2-3, follows commands, no strength deficits  Skin: macular rash on chest/back has resolved          LABS:                        11.1   14.35 )-----------( 72       ( 20 Jul 2023 05:30 )             32.7     07-20    129<L>  |  91<L>  |  12  ----------------------------<  197<H>  4.4   |  28  |  0.57    Ca    9.1      20 Jul 2023 05:30  Phos  1.6     07-20  Mg     1.8     07-20        Urinalysis Basic - ( 20 Jul 2023 05:30 )    Color: x / Appearance: x / SG: x / pH: x  Gluc: 197 mg/dL / Ketone: x  / Bili: x / Urobili: x   Blood: x / Protein: x / Nitrite: x   Leuk Esterase: x / RBC: x / WBC x   Sq Epi: x / Non Sq Epi: x / Bacteria: x          RADIOLOGY & ADDITIONAL TESTS:  No new imaging    MEDICATIONS  (STANDING):  atorvastatin 20 milliGRAM(s) Oral at bedtime  dexAMETHasone  Injectable 4 milliGRAM(s) IV Push every 12 hours  escitalopram 5 milliGRAM(s) Oral daily  fludroCORTISONE 0.1 milliGRAM(s) Oral daily  fluticasone propionate 50 MICROgram(s)/spray Nasal Spray 1 Spray(s) Both Nostrils two times a day  hydrocortisone 1% Cream 1 Application(s) Topical daily  insulin lispro (ADMELOG) corrective regimen sliding scale   SubCutaneous Before meals and at bedtime  levETIRAcetam  IVPB 1000 milliGRAM(s) IV Intermittent every 12 hours  melatonin 5 milliGRAM(s) Oral at bedtime  senna 2 Tablet(s) Oral at bedtime  sodium chloride 3 Gram(s) Oral every 6 hours  sucralfate 1 Gram(s) Oral every 12 hours    MEDICATIONS  (PRN):  acetaminophen     Tablet .. 650 milliGRAM(s) Oral every 6 hours PRN Temp greater or equal to 38C (100.4F), Mild Pain (1 - 3)  benzocaine/menthol Lozenge 1 Lozenge Oral every 4 hours PRN Sore Throat  ondansetron Injectable 4 milliGRAM(s) IV Push every 6 hours PRN Nausea and/or Vomiting

## 2023-07-20 NOTE — PROGRESS NOTE ADULT - SUBJECTIVE AND OBJECTIVE BOX
Nephrology progress note    68Y M with no hx of CKD, Glioblastoma, chronic hyponatremia, on admission (7/7) found to have acute on chronic symptomatic hyponatremia with sNa of 115, patient appropriately managed by Neurology, sNa now fluctuating between 130-135      ON events/Subjective: No acute events overnight. Patient seen at bedside. Headache has resolved and patient feeling better. No other complaints.    Allergies:  amoxicillin (Rash)    Hospital Medications:   MEDICATIONS  (STANDING):  atorvastatin 20 milliGRAM(s) Oral at bedtime  dexAMETHasone  Injectable 4 milliGRAM(s) IV Push every 12 hours  escitalopram 5 milliGRAM(s) Oral daily  fludroCORTISONE 0.1 milliGRAM(s) Oral daily  fluticasone propionate 50 MICROgram(s)/spray Nasal Spray 1 Spray(s) Both Nostrils two times a day  hydrocortisone 1% Cream 1 Application(s) Topical daily  insulin lispro (ADMELOG) corrective regimen sliding scale   SubCutaneous Before meals and at bedtime  levETIRAcetam  IVPB 1000 milliGRAM(s) IV Intermittent every 12 hours  melatonin 5 milliGRAM(s) Oral at bedtime  senna 2 Tablet(s) Oral at bedtime  sodium chloride 3 Gram(s) Oral every 6 hours  sucralfate 1 Gram(s) Oral every 12 hours    REVIEW OF SYSTEMS:  CONSTITUTIONAL: No weakness, fevers or chills  EYES/ENT: No visual changes;  No vertigo or throat pain   NECK: No pain or stiffness  RESPIRATORY: No cough, wheezing, hemoptysis; No shortness of breath  CARDIOVASCULAR: No chest pain or palpitations.  GASTROINTESTINAL: No abdominal or epigastric pain. No nausea, vomiting, or hematemesis; No diarrhea or constipation. No melena or hematochezia.  GENITOURINARY: No dysuria, frequency, foamy urine, urinary urgency, incontinence or hematuria  NEUROLOGICAL: No numbness or weakness  SKIN: No itching, burning, rashes, or lesions   VASCULAR: No bilateral lower extremity edema.   All other review of systems is negative unless indicated above.    VITALS:  T(F): 98.1 (07-20-23 @ 09:19), Max: 98.8 (07-20-23 @ 05:00)  HR: 82 (07-20-23 @ 11:29)  BP: 130/78 (07-20-23 @ 11:29)  RR: 17 (07-20-23 @ 11:29)  SpO2: 94% (07-20-23 @ 11:29)  Wt(kg): --    07-18 @ 07:01  -  07-19 @ 07:00  --------------------------------------------------------  IN: 1830 mL / OUT: 2075 mL / NET: -245 mL    07-19 @ 07:01  -  07-20 @ 07:00  --------------------------------------------------------  IN: 470 mL / OUT: 2250 mL / NET: -1780 mL        PHYSICAL EXAM:  Constitutional: NAD  HEENT: anicteric sclera, oropharynx clear, MMM  Neck: No JVD  Respiratory: CTAB, no wheezes, rales or rhonchi  Cardiovascular: S1, S2, RRR  Gastrointestinal: BS+, soft, NT/ND  Extremities: No cyanosis or clubbing. No peripheral edema  Neurological: A/O x 2-3, no focal deficits  Psychiatric: Normal mood, normal affect   Skin: No rashes      LABS:  07-20    129<L>  |  91<L>  |  12  ----------------------------<  197<H>  4.4   |  28  |  0.57    Ca    9.1      20 Jul 2023 05:30  Phos  1.6     07-20  Mg     1.8     07-20                            11.1   14.35 )-----------( 72       ( 20 Jul 2023 05:30 )             32.7       Urine Studies:  Creatinine Trend: 0.57<--, 0.58<--, 0.52<--, 0.52<--, 0.55<--, 0.55<--  Urinalysis Basic - ( 20 Jul 2023 05:30 )    Color:  / Appearance:  / SG:  / pH:   Gluc: 197 mg/dL / Ketone:   / Bili:  / Urobili:    Blood:  / Protein:  / Nitrite:    Leuk Esterase:  / RBC:  / WBC    Sq Epi:  / Non Sq Epi:  / Bacteria:       Osmolality, Random Urine: 508 mosm/kg (07-19 @ 04:03)  Sodium, Random Urine: 94 mmol/L (07-19 @ 04:03)  Osmolality, Random Urine: 741 mosm/kg (07-16 @ 09:27)  Sodium, Random Urine: 82 mmol/L (07-16 @ 09:27)  Osmolality, Random Urine: 834 mosm/kg (07-14 @ 18:45)  Creatinine, Random Urine: 79 mg/dL (07-14 @ 18:45)  Sodium, Random Urine: 116 mmol/L (07-14 @ 18:45)    RADIOLOGY & ADDITIONAL STUDIES:

## 2023-07-20 NOTE — CHART NOTE - NSCHARTNOTEFT_GEN_A_CORE
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      Type 2 diabetes mellitus      Hyperlipidemia      Iron deficiency anemia      Nephrolithiasis      Thrombocytopenia      Hyponatremia      BPH (benign prostatic hyperplasia)      S/P craniotomy      H/O tracheostomy          Current Nutrition Order:  Diet, Consistent Carbohydrate w/Evening Snack:   Supplement Feeding Modality:  Oral  Ensure Enlive Cans or Servings Per Day:  3       Frequency:  Three Times a day (07-20-23 @ 17:55)      PO Intake: Good (%) [   ]  Fair (50-75%) [   ] Poor (<25%) [  XX ] intake about 50% person in law report to leoncio in interview    GI Issues: no N/V/D/C reported    Pain: none     Skin Integrity: no pressure ulcers    Labs:   07-20    129<L>  |  91<L>  |  12  ----------------------------<  197<H>  4.4   |  28  |  0.57    Ca    9.1      20 Jul 2023 05:30  Phos  1.6     07-20  Mg     1.8     07-20      CAPILLARY BLOOD GLUCOSE      POCT Blood Glucose.: 169 mg/dL (20 Jul 2023 16:01)  POCT Blood Glucose.: 256 mg/dL (20 Jul 2023 11:59)  POCT Blood Glucose.: 193 mg/dL (20 Jul 2023 06:47)  POCT Blood Glucose.: 208 mg/dL (19 Jul 2023 21:22)      Medications:  MEDICATIONS  (STANDING):  atorvastatin 20 milliGRAM(s) Oral at bedtime  dexAMETHasone  Injectable 4 milliGRAM(s) IV Push every 12 hours  escitalopram 5 milliGRAM(s) Oral daily  fludroCORTISONE 0.1 milliGRAM(s) Oral daily  fluticasone propionate 50 MICROgram(s)/spray Nasal Spray 1 Spray(s) Both Nostrils two times a day  hydrocortisone 1% Cream 1 Application(s) Topical daily  insulin lispro (ADMELOG) corrective regimen sliding scale   SubCutaneous Before meals and at bedtime  levETIRAcetam  IVPB 1000 milliGRAM(s) IV Intermittent every 12 hours  melatonin 5 milliGRAM(s) Oral at bedtime  senna 2 Tablet(s) Oral at bedtime  sodium chloride 3 Gram(s) Oral every 6 hours  sucralfate 1 Gram(s) Oral every 12 hours    MEDICATIONS  (PRN):  acetaminophen     Tablet .. 650 milliGRAM(s) Oral every 6 hours PRN Temp greater or equal to 38C (100.4F), Mild Pain (1 - 3)  benzocaine/menthol Lozenge 1 Lozenge Oral every 4 hours PRN Sore Throat  ondansetron Injectable 4 milliGRAM(s) IV Push every 6 hours PRN Nausea and/or Vomiting      Anthropometrics:  Daily     7/7: 160.1 Height: 67 inches    IBW:148#+/-10%     100% IBW (upper end of range)    Weight Change: n/a    Nutrition Focused Physical Exam: Completed [   ]  Not Pertinent [  XX ]  Muscle Wasting- Temporal [   ]  Clavicle/Pectoral [   ]  Shoulder/Deltoid [   ]  Scapula [   ]  Interosseous [   ]  Quadriceps [   ]  Gastrocnemius [   ]  Fat Wasting- Orbital [   ]  Buccal [   ]  Triceps [   ]  Rib [   ]  Suspect [PCM] 2/2 to physical assessment, [poor intake], and [wt loss]; please see malnutrition chart note.      Estimated energy needs:   Weight used for calculations	current weight  Estimated Energy Needs Weight (lbs)	160 lb  Estimated Energy Needs Weight (kg)	72.5 kg    Estimated Energy Needs From (ji/kg) 30  Estimated Energy Needs To (ji/kg)	35  Estimated Energy Needs Calculated From (ji/kg) 2175  Estimated Energy Needs Calculated To (ji/kg)	2537    Estimated Protein Needs From (g/kg)	1.2  Estimated Protein Needs To (g/kg)	1.5  Estimated Protein Needs Calculated From (g/kg) 87  Estimated Protein Needs Calculated To (g/kg)	108.75    Other Calculations	Based on Standards of Care pt within % IBW (108%) thus actual body weight used for all calculations (160#). Needs adjusted for advanced age and malnutrition. Fluid recs per team in view of hyponatremia.  Subjective: Writer observed patient in bed; patient did not respond to writer upon attempt to interview .Son in law present and answered all questions for patient. Intake about 50% now with no chewing or swallowing problems reported. Diet appropriate, well tolerated. No specific preferences requested; receives room service assist daily to reflect preferences in diet record. Son in Law agreed to addition of Glucerna per diagnosis of malnutrition, intake about 50% to ensure all needs are met. he stated that patient likes Glucerna as it is taken daily at home.    Previous Nutrition Diagnosis: Previous Nutrition Diagnosis: Malnutrition (moderate) related to chronic disease as evidenced by <75% nutrition needs >1 month, mild muscle wasting     Active [ X ]  Resolved [   ]    Goal:  - Consistently meets > 75% EER   - does not show further s/s of malnutrition             Recommendations: Continue diet, add glucerna 3x dally (220 calories and 10 grams protein per serving); writer spoke with resident Dalton who agreed to add the Glucerna as recommended. Son in Law also expressed agreement with addition of Glucerna as intake is about 50% to ensure needs  are met.    Education: Writer reviewed My Plate for diabetes with son in law who was aware of principles presented as patient with diabetes for 17 years and family was educated before per son in 's report. educated on high calorie high protein foods to eat after discharge to support repletion per diagnosis of moderate PEM with verbalized understanding.     Risk Level: High [   ] Moderate [   ] Low [   ]

## 2023-07-20 NOTE — PROGRESS NOTE ADULT - ASSESSMENT
67YO male with a past medical history of type 2 diabetes, hyperlipidemia, iron deficiency anemia, tracheal stenosis s/p tracheostomy (which was closed by ENT 7 days ago); glioblastoma s/p resection 1/27/2022, and Avastin treatment 3/2023 presenting with expressive aphasia, nuasea and vomiting x1 day. CT shows new hyperdensity in left frontal parietal - surgical site c/f disease progression.  Patient was clinically stable for DC then developed acute deterioration on 7/17 due to SVT, fever, metabolic encephalopathy    #Hyper density in left frontal parietal which may represent progression of disease.   -Management per primary team   -Continue Keppra 750mg BID  -Continue Decadron 4mg q12  -Pt's ASA 81 held i/s/o thrombocytopenia     #SIRS with end organ damage  - febrile, leukocytosis and tachycardia with elevated lactic acid on 7/17  - CT C/A/P w/o infectious source  - blood cultures likely contaminant, f/u surveillance cultures  - ID consulted, appreciate recs.  Low concern for meningitis per neurosurgery team, likely drug reaction.  Holding abx    #Rash  - per primary team started after URENA, will stop.  Rash resolving  - continue to monitor    #Tachycardia  Resolved  - developed SVT to 170s during rapid response, responded to IV labetalol with return to NSR.  Currently sinus tachy to 120s on tele  - treat underlying cause, likely drug reaction and fever  - trops during HTN emergency/rapid response negative    #Hyponatremia   -Renal consult appreciated, Na 129 today would repeat Ulytes    -florinef tapering down, salt tabs 2gm q6   -trend BMP  - would avoid Urena due to possible drug reaction    #Type II Diabetes  - A1c 5.7  -Pt is on Januvia at home   -Continue ISS, will add basal coverage based on requirements    #HLD  - Continue Atorvastatin 20mg daily     #Thrombocytopenia   -Pt's platelets are stable 70-100k  -Heme consulted for thrombocytopenia; Per Heme documentation the pt w/  history of thrombocytopenia with his baseline range  since the latter half of last year and that the pt's Pt's thrombocytopenia likely due to past treatment with temozolomide.   Per heme transfuse 1U plt if bleeding and platelet count <50k, if febrile and platelet count <20k,   Dopplers 7/8/23 negative for DVT    #Dispo:   -Pt is for Home PT pending medical improvement

## 2023-07-20 NOTE — PROGRESS NOTE ADULT - ASSESSMENT
68Y M with no hx of CKD, Glioblastoma, chronic hyponatremia, on admission (7/7) found to have acute on chronic symptomatic hyponatremia with sNa of 115, patient appropriately managed by Neurology, sNa now fluctuating between 130-135    #Chronic asymptomatic hyponatremia   Recent Urine osm 508 and Suleman 94 7/19, likely SIADH given intracranial pathology along with pt on SSRI   TSH wnl  Pt on Sodium Tabs 3g Q6HRS, Florinef 0.2mcg, and fluid restriction   Na 129 today      Plan:  Restart Urena 15g BID if patient able to tolerate. C/w salt tabs and fluid restriction as above  Continue to taper florinef as no evidence of adrenal insufficiency - Plan on d/c tomorrow  If Na lower, may try tolvaptan tomorrow  Follow up BMP in evening  UOsm and Suleman in the morning w/ AM BMP  Check AM cortisol to complete workup of hyponatremia

## 2023-07-21 NOTE — CHART NOTE - NSCHARTNOTEFT_GEN_A_CORE
neurologically stable, c/w fluid restriction, f/u AM Na, urine osm, urine Na, cortisol. Given 15mg of tolvaptan. Fluid restriction, florinef d/c'd per nephro recs. Held salt tabs for today.

## 2023-07-21 NOTE — PROGRESS NOTE ADULT - SUBJECTIVE AND OBJECTIVE BOX
SUBJECTIVE/OBJECTIVE: Interval events noted.  neuro exam improved, suspect possible drug reaction as cause to AMS.  Pt seen and examined.  off restraints, has 1:1. able to walk with PT yesterday. Comprehensive symptom assessment and GOC exploration as noted below. Extensive time spent discussing plan of care with and providing support to son in law uMir at bedside.         ALLERGIES: amoxicillin (Rash)      MEDICATIONS: REVIEWED  MEDICATIONS  (STANDING):  atorvastatin 20 milliGRAM(s) Oral at bedtime  dexAMETHasone  Injectable 4 milliGRAM(s) IV Push every 12 hours  escitalopram 5 milliGRAM(s) Oral daily  fludroCORTISONE 0.1 milliGRAM(s) Oral daily  fluticasone propionate 50 MICROgram(s)/spray Nasal Spray 1 Spray(s) Both Nostrils two times a day  hydrocortisone 1% Cream 1 Application(s) Topical daily  insulin lispro (ADMELOG) corrective regimen sliding scale   SubCutaneous Before meals and at bedtime  levETIRAcetam  IVPB 1000 milliGRAM(s) IV Intermittent every 12 hours  magnesium sulfate  IVPB 2 Gram(s) IV Intermittent every 2 hours  melatonin 5 milliGRAM(s) Oral at bedtime  senna 2 Tablet(s) Oral at bedtime  sodium chloride 3 Gram(s) Oral every 6 hours  sucralfate 1 Gram(s) Oral every 12 hours    MEDICATIONS  (PRN):  acetaminophen     Tablet .. 650 milliGRAM(s) Oral every 6 hours PRN Temp greater or equal to 38C (100.4F), Mild Pain (1 - 3)  benzocaine/menthol Lozenge 1 Lozenge Oral every 4 hours PRN Sore Throat  ondansetron Injectable 4 milliGRAM(s) IV Push every 6 hours PRN Nausea and/or Vomiting      PRESENT SYMPTOMS:   [x ]Unable to obtain due to poor mentation   Source if other than patient:  [x ]Family   [ ]Team     Pain [  ]  Analgesic Use (PRNs) for past 24 hours:  - none     If [  ], pt denies symptom.   Dyspnea:         [  ]  Anxiety:           [x  ] worse at night   Difficulty sleeping: [  ]  Fatigue:           [  ]  Nausea:           [  ]  Loss of appetite:     [ x ]   Dysphagia: [  ]  Constipation:   [  ]        Other Symptoms:    All other review of systems negative [ x ]    ECOG Performance:     4  Current Palliative Performance Scale/Karnofsky Score:    %  Preadmit Karnofsky:   %          PEx:  General: older man lying in bed, NAD, oriented x2  lethargic verbal  Behavioral: calm  HEENT: atraumatic,  No temporal wasting,  +dry mouth,   RESP: Reg rhythm, No  tachypnea/labored breathing,  No audible excessive secretions,  CTAB, diminished bilat bases  CV: RRR, S1S2,  No  tachycardia  GI: soft non distended non tender  incontinent   MUSK: weakness x4, No  edema, ambulatory with assistance  SKIN: No abnormal skin lesions, Poor skin turgor  NEURO: +cognitive impairment      No dsyphagia dysarthria paresis  Oral intake ability: minimal moderate capability    T(C): 36.8 (07-21-23 @ 10:00), Max: 37.1 (07-20-23 @ 17:00)  HR: 64 (07-21-23 @ 11:15) (64 - 106)  BP: 144/83 (07-21-23 @ 11:15) (126/69 - 144/83)  RR: 17 (07-21-23 @ 11:15) (16 - 17)  SpO2: 97% (07-21-23 @ 11:15) (91% - 98%)  Wt(kg): --    LABS: REVIEWED  CBC:                        9.9    7.51  )-----------( 72       ( 21 Jul 2023 06:34 )             27.9     CMP:    07-21    125<L>  |  90<L>  |  13  ----------------------------<  187<H>  3.7   |  27  |  0.56    Ca    8.1<L>      21 Jul 2023 06:34  Phos  2.9     07-21  Mg     1.7     07-21        RADIOLOGY & ADDITIONAL STUDIES:   - reviewed

## 2023-07-21 NOTE — PROGRESS NOTE ADULT - ASSESSMENT
68Y M with no hx of CKD, Glioblastoma, chronic hyponatremia, on admission (7/7) found to have acute on chronic symptomatic hyponatremia with sNa of 115, patient appropriately managed by Neurology, sNa fluctuating between 130-135    #Chronic asymptomatic hyponatremia   Recent Urine osm 508 and Suleman 94 7/19, likely SIADH given intracranial pathology along with pt on SSRI   TSH wnl  Pt on Sodium Tabs 3g Q6HRS, Florinef 0.1mcg, and fluid restriction   Na 125 today    Plan:  - Give tolvaptan 15mg once since patient can not tolerate Urena. May need to administer D5W if patient overcorrects  - Repeat BMP 6PM and 10PM  - Hold salt tabs today 7/21  - d/c fluid restriction   - d/c florinef today 7/21  - Follow up AM cortisol to complete workup of hyponatremia

## 2023-07-21 NOTE — PROGRESS NOTE ADULT - SUBJECTIVE AND OBJECTIVE BOX
Patient is a 68y old  Male who presents with a chief complaint of Altered Mental Status & Weakness x2 days (21 Jul 2023 00:23)      SUBJECTIVE:  Patient seen and examined at bedside.  Lethargic but awakes easily to voice, answers all questions appropriately.  Per roommate patient did not sleep much last evening    ROS:  Denies fevers, chills, headache, vision changes, cough, SOB, chest pain, Abdominal pain, N/V.  All other ROS negative except as above    Vital Signs Last 12 Hrs  T(F): 98.2 (07-21-23 @ 10:00), Max: 98.2 (07-21-23 @ 10:00)  HR: 64 (07-21-23 @ 11:15) (64 - 82)  BP: 144/83 (07-21-23 @ 11:15) (130/75 - 144/83)  BP(mean): 108 (07-21-23 @ 11:15) (97 - 108)  RR: 17 (07-21-23 @ 11:15) (16 - 17)  SpO2: 97% (07-21-23 @ 11:15) (97% - 98%)  I&O's Summary    20 Jul 2023 07:01  -  21 Jul 2023 07:00  --------------------------------------------------------  IN: 790 mL / OUT: 1520 mL / NET: -730 mL    21 Jul 2023 07:01  -  21 Jul 2023 12:24  --------------------------------------------------------  IN: 0 mL / OUT: 350 mL / NET: -350 mL        PHYSICAL EXAM:  Constitutional: NAD, lying in bed  HEENT: PERRLA, EOMI, MMM  Neck: Supple  Respiratory: CTA B/L. No w/r/r. on 2L NC  Cardiovascular: RRR, regular, normal S1 and S2, no m/r/g.   Gastrointestinal: +BS, soft NTND  Extremities: wwp; no cyanosis, clubbing or edema.   Vascular: Pulses equal and strong throughout.   Neurological: A&Ox2-3, follows commands, no strength deficits  Skin: macular rash on chest/back has resolved          LABS:                        9.9    7.51  )-----------( 72       ( 21 Jul 2023 06:34 )             27.9     07-21    125<L>  |  90<L>  |  13  ----------------------------<  187<H>  3.7   |  27  |  0.56    Ca    8.1<L>      21 Jul 2023 06:34  Phos  2.9     07-21  Mg     1.7     07-21        Urinalysis Basic - ( 21 Jul 2023 06:34 )    Color: x / Appearance: x / SG: x / pH: x  Gluc: 187 mg/dL / Ketone: x  / Bili: x / Urobili: x   Blood: x / Protein: x / Nitrite: x   Leuk Esterase: x / RBC: x / WBC x   Sq Epi: x / Non Sq Epi: x / Bacteria: x          RADIOLOGY & ADDITIONAL TESTS:  No new imaging    MEDICATIONS  (STANDING):  atorvastatin 20 milliGRAM(s) Oral at bedtime  dexAMETHasone  Injectable 4 milliGRAM(s) IV Push every 12 hours  escitalopram 5 milliGRAM(s) Oral daily  fludroCORTISONE 0.1 milliGRAM(s) Oral daily  fluticasone propionate 50 MICROgram(s)/spray Nasal Spray 1 Spray(s) Both Nostrils two times a day  hydrocortisone 1% Cream 1 Application(s) Topical daily  insulin lispro (ADMELOG) corrective regimen sliding scale   SubCutaneous Before meals and at bedtime  levETIRAcetam  IVPB 1000 milliGRAM(s) IV Intermittent every 12 hours  magnesium sulfate  IVPB 2 Gram(s) IV Intermittent every 2 hours  melatonin 5 milliGRAM(s) Oral at bedtime  senna 2 Tablet(s) Oral at bedtime  sodium chloride 3 Gram(s) Oral every 6 hours  sucralfate 1 Gram(s) Oral every 12 hours    MEDICATIONS  (PRN):  acetaminophen     Tablet .. 650 milliGRAM(s) Oral every 6 hours PRN Temp greater or equal to 38C (100.4F), Mild Pain (1 - 3)  benzocaine/menthol Lozenge 1 Lozenge Oral every 4 hours PRN Sore Throat  ondansetron Injectable 4 milliGRAM(s) IV Push every 6 hours PRN Nausea and/or Vomiting

## 2023-07-21 NOTE — PROGRESS NOTE ADULT - SUBJECTIVE AND OBJECTIVE BOX
Nephrology progress note    68Y M with no hx of CKD, Glioblastoma, chronic hyponatremia, on admission (7/7) found to have acute on chronic symptomatic hyponatremia with sNa of 115, patient appropriately managed by Neurology, sNa now fluctuating between 130-135    ON events/Subjective: No acute events overnight. Patient seen at bedside. Patient has no significant complaints. Patient denies chest pain, shortness of breath, dizziness, abdominal pain, N/V/D.    Allergies:  amoxicillin (Rash)    Hospital Medications:   MEDICATIONS  (STANDING):  atorvastatin 20 milliGRAM(s) Oral at bedtime  dexAMETHasone  Injectable 4 milliGRAM(s) IV Push every 12 hours  escitalopram 5 milliGRAM(s) Oral daily  fludroCORTISONE 0.1 milliGRAM(s) Oral daily  fluticasone propionate 50 MICROgram(s)/spray Nasal Spray 1 Spray(s) Both Nostrils two times a day  hydrocortisone 1% Cream 1 Application(s) Topical daily  insulin lispro (ADMELOG) corrective regimen sliding scale   SubCutaneous Before meals and at bedtime  levETIRAcetam  IVPB 1000 milliGRAM(s) IV Intermittent every 12 hours  magnesium sulfate  IVPB 2 Gram(s) IV Intermittent every 2 hours  melatonin 5 milliGRAM(s) Oral at bedtime  senna 2 Tablet(s) Oral at bedtime  sodium chloride 3 Gram(s) Oral every 6 hours  sucralfate 1 Gram(s) Oral every 12 hours    REVIEW OF SYSTEMS:  CONSTITUTIONAL: No weakness, fevers or chills  EYES/ENT: No visual changes;  No vertigo or throat pain   NECK: No pain or stiffness  RESPIRATORY: No cough, wheezing, hemoptysis; No shortness of breath  CARDIOVASCULAR: No chest pain or palpitations.  GASTROINTESTINAL: No abdominal or epigastric pain. No nausea, vomiting, or hematemesis; No diarrhea or constipation. No melena or hematochezia.  GENITOURINARY: No dysuria, frequency, foamy urine, urinary urgency, incontinence or hematuria  NEUROLOGICAL: No numbness or weakness  SKIN: No itching, burning, rashes, or lesions   VASCULAR: No bilateral lower extremity edema.   All other review of systems is negative unless indicated above.    VITALS:  T(F): 98.2 (07-21-23 @ 10:00), Max: 98.7 (07-20-23 @ 17:00)  HR: 64 (07-21-23 @ 11:15)  BP: 144/83 (07-21-23 @ 11:15)  RR: 17 (07-21-23 @ 11:15)  SpO2: 97% (07-21-23 @ 11:15)  Wt(kg): --    07-19 @ 07:01  -  07-20 @ 07:00  --------------------------------------------------------  IN: 470 mL / OUT: 2250 mL / NET: -1780 mL    07-20 @ 07:01  -  07-21 @ 07:00  --------------------------------------------------------  IN: 790 mL / OUT: 1520 mL / NET: -730 mL    07-21 @ 07:01  -  07-21 @ 13:15  --------------------------------------------------------  IN: 0 mL / OUT: 350 mL / NET: -350 mL        PHYSICAL EXAM:  Constitutional: NAD  HEENT: anicteric sclera, oropharynx clear, MMM  Neck: No JVD  Respiratory: CTAB, no wheezes, rales or rhonchi  Cardiovascular: S1, S2, RRR  Gastrointestinal: BS+, soft, NT/ND  Extremities: No cyanosis or clubbing. No peripheral edema  Neurological: A/O x 3, no focal deficits  Psychiatric: Normal mood, normal affect  : No CVA tenderness. No gonzalez.   Skin: No rashes    LABS:  07-21    125<L>  |  90<L>  |  13  ----------------------------<  187<H>  3.7   |  27  |  0.56    Ca    8.1<L>      21 Jul 2023 06:34  Phos  2.9     07-21  Mg     1.7     07-21                            9.9    7.51  )-----------( 72       ( 21 Jul 2023 06:34 )             27.9       Urine Studies:  Creatinine Trend: 0.56<--, 0.57<--, 0.58<--, 0.52<--, 0.52<--, 0.55<--  Urinalysis Basic - ( 21 Jul 2023 06:34 )    Color:  / Appearance:  / SG:  / pH:   Gluc: 187 mg/dL / Ketone:   / Bili:  / Urobili:    Blood:  / Protein:  / Nitrite:    Leuk Esterase:  / RBC:  / WBC    Sq Epi:  / Non Sq Epi:  / Bacteria:       Osmolality, Random Urine: 508 mosm/kg (07-19 @ 04:03)  Sodium, Random Urine: 94 mmol/L (07-19 @ 04:03)  Osmolality, Random Urine: 741 mosm/kg (07-16 @ 09:27)  Sodium, Random Urine: 82 mmol/L (07-16 @ 09:27)  Osmolality, Random Urine: 834 mosm/kg (07-14 @ 18:45)  Creatinine, Random Urine: 79 mg/dL (07-14 @ 18:45)  Sodium, Random Urine: 116 mmol/L (07-14 @ 18:45)    RADIOLOGY & ADDITIONAL STUDIES:

## 2023-07-21 NOTE — PROGRESS NOTE ADULT - ASSESSMENT
67YO male with a past medical history of type 2 diabetes, hyperlipidemia, iron deficiency anemia, tracheal stenosis s/p tracheostomy (which was closed by ENT 7 days ago); glioblastoma s/p resection 1/27/2022, and Avastin treatment 3/2023 presenting with expressive aphasia, nuasea and vomiting x1 day. CT shows new hyperdensity in left frontal parietal - surgical site c/f disease progression.  Patient was clinically stable for DC then developed acute deterioration on 7/17 due to SVT, fever, metabolic encephalopathy    #Hyper density in left frontal parietal which may represent progression of disease.   -Management per primary team   -Continue Keppra 750mg BID  -Continue Decadron 4mg q12  -Pt's ASA 81 held i/s/o thrombocytopenia     #SIRS with end organ damage  #Staph epi blood cultures  - febrile, leukocytosis and tachycardia with elevated lactic acid on 7/17  - CT C/A/P w/o infectious source  - blood cultures likely contaminant per ID, however now with 4/4 cultures positive and known to be drawn from separate sites by primary team, f/u surveillance cultures  - if any fever or repeat positive blood culture start Vanc and reconsult ID    #Hyponatremia   -Renal consult appreciated, Na worsened to 125, would repeat Ulytes, consider hypertonic saline IV  -florinef tapering down, salt tabs 3gm q6   -trend BMP  - would avoid Urena due to possible drug reaction    #Rash  - per primary team started after URENA, will stop.  Rash resolving  - continue to monitor    #Tachycardia  Resolved  - developed SVT to 170s during rapid response, responded to IV labetalol with return to NSR.  - treat underlying cause, likely drug reaction and fever  - trops during HTN emergency/rapid response negative    #Type II Diabetes  - A1c 5.7  -Pt is on Januvia at home   -Continue ISS, will add basal coverage based on requirements    #HLD  - Continue Atorvastatin 20mg daily     #Thrombocytopenia   -Pt's platelets are stable 70-100k  -Heme consulted for thrombocytopenia; Per Heme documentation the pt w/  history of thrombocytopenia with his baseline range  since the latter half of last year and that the pt's Pt's thrombocytopenia likely due to past treatment with temozolomide.   Per heme transfuse 1U plt if bleeding and platelet count <50k, if febrile and platelet count <20k,   Dopplers 7/8/23 negative for DVT    #Dispo:   -Pt is for Home PT pending medical improvement

## 2023-07-21 NOTE — PROGRESS NOTE ADULT - SUBJECTIVE AND OBJECTIVE BOX
HPI:  Osiel Norwood is a 69YO male with a past medical history of type 2 diabetes, hyperlipidemia, iron deficiency anemia, tracheal stenosis s/p tracheostomy (which was closed by ENT 7 days ago); glioblastoma s/p resection 1/27/2022, and Avastin treatment 3/2023 presenting with altered mental status and weakness x2 days. Per family, they last saw him last night around 6pm when he was at baseline. Patient has intermittent expressive aphasia but is getting worse and now is persistent, and has intermittent nausea and vomiting since last night. Patient was supposed to receive an outpatient brain MRI on 7/25/2023.  Stroke code performed in  is negative for CVA. Head CT shows new hyperdensity in frontal parietal - surgical site. MRI is ordered to rule out tumor recurrence.   Per daughter at bedside, Entresto and Eliquis were stopped per his PC - cardiologist.      (07 Jul 2023 13:10)    OVERNIGHT EVENTS: neurologically stable, c/w fluid restriction, f/u AM Na, urine osm, urine Na, cortisol    Hospital course:   7/7: Admitted. Na 115 in ED with repeat 118, started 3% at 30cc/hr, and salt tabs 3q6, stability CTH in ED showing Interval development of a 13 mm hyperdense nodule along the   inferior margin of the resection cavity which may represent progression   of disease with hemorrhage, repeat CTH stable, urine studies ordered  7/8: Neuro stable, 12AM BMP Na114 3% increased to 40cc/hr, urine studies, pancx to r/o infection since pt is immunocompromised. Changed pantoprazole to sucralfate for GI ppx d/t thrombocytopenia. LE dopplers negative. DC'd 3%. ENT consulted to assess stoma, recommend follow up upon discharge with ENT, Repeat sodium 122. Restarted 3% at 30.  7/9: ANA LILIA o/n neuro stable. remains on 3%@30cc/hr. Started florinef, increased 3% to 50cc/hr. Increased 3% to 75cc/hr.    7/10: continued current 3% rate o/n. Na 127 --> 125, cont 3% @75cc/hr, f/u repeat Na this evening, likely stepdown tomorrow. Pend dispo home with home PT/OT when able. Heme consulted for thrombocytopenia  7/11: ANA LILIA o/n. Remains on 3% @75cc/hr. Decreased 3% to 50cc/hr. Started 1.2L fluid restriction. Repeat Na corrected 128  7/12: ANA LILIA overnight, remains on 3%, SDU from ICU  7/13: ANA LILIA overnight, neuro stable, on 3%@50, Am sodium 137, decreased 3% to 25cc/hr, repeat Na 138, decreased to 15cc/hr.  7/14: ANA LILIA overnight, neuro stable, remains on 3%@15cc/hr  7/15: ANA LILIA overnight. Neuro exam stable. Pt remains on 3% saline.Sodium 134 this AM remains on 3%@25. Per nephrology recs hypertonic Na d'ceed, Na tabs 2q6, URENA 15g BID, cont florinef/ fluid restriction.Per nephrology Na >130 ok when ready for d/c   7/16: ANA LILIA overnight, hypertonics d/c now on urea powder/1L fluid restriction/florinef and salt tabs per renal, hydrocortisone cream ordered for rash on back, rec for Home PT  7/17: ANA LILIA overnight, following Na, renal following, ENT saw for hoarse voice rec followup with CT surgery for possible dilation, pending Home PT. Patient developed sudden onset severe headache. Hyperventilating with increased work of breathing. Wheezing in all lung fields to auscultation. Neuro intact on exam. Duoneb x1 ordered. Dilaudid 0.25 IV given for pain control. Subsequently patient developed nausea with multiple episodes of vomiting. Hypertensive with SBP in the 190s. Given hydralazine 10mg IVP, Zofran. Stroke code and rapid response called. STAT labs sent. CTH/CTA/CTP completed. Tachycardic to the 180s, consistent with SVT, remained hypertensive. Given 10 of labetalol, SVT broke. Transferred to Telemetry. WBC 23.43, lactate 8.5. Given 1L fluid bolus. Pan culture sent. Started empirically on Vanc/Meropenem.   7/18: Put on eeg. Voiding while retaining urine, SC for 500cc. EEG +spikes, epilepsy consulted. CTA chest and CT A/P pending. Keppra increased 1g BID.   7/19: Cont EEG, trend Na, possible NGT today if not able to tolerate PO. Blood cx growing GS + cocci in clusters, f/u MRSA swab and pan cx, cont meropenem and vancomycin. Vanc trough in am. F/u urine studies.   7/20: Off EEG, neuro exam improved. ID following for concern for sepsis, likely aerobic blood cx bottle staph epi contaminant, cont monitoring off of abx, possible drug reaction as cause. F/u surveillance blood cx. Hyponatremia, repeat Na 129 overnight from 130, cont current regimen and f/u am labs and nephrology recs. AM na 129 continuing fluid restriction, nephro recommending restarting urena, but holding due to possible drug reaction previously.   7/21: neurologically stable, c/w fluid restriction, f/u AM Na, urine osm, urine Na, cortisol    Vital Signs Last 24 Hrs  T(C): 36.4 (20 Jul 2023 22:00), Max: 37.1 (20 Jul 2023 05:00)  T(F): 97.5 (20 Jul 2023 22:00), Max: 98.8 (20 Jul 2023 05:00)  HR: 78 (20 Jul 2023 20:20) (78 - 106)  BP: 135/80 (20 Jul 2023 20:20) (119/68 - 136/70)  BP(mean): 102 (20 Jul 2023 20:20) (86 - 102)  RR: 17 (20 Jul 2023 20:20) (16 - 18)  SpO2: 94% (20 Jul 2023 20:20) (91% - 96%)    Parameters below as of 20 Jul 2023 20:20  Patient On (Oxygen Delivery Method): nasal cannula  O2 Flow (L/min): 2      I&O's Summary    19 Jul 2023 07:01  -  20 Jul 2023 07:00  --------------------------------------------------------  IN: 470 mL / OUT: 2250 mL / NET: -1780 mL    20 Jul 2023 07:01  -  21 Jul 2023 00:23  --------------------------------------------------------  IN: 390 mL / OUT: 600 mL / NET: -210 mL        PHYSICAL EXAM:  General: NAD, pt is comfortably sitting up in bed, A&O x 2-3 ([place]), on RA  HEENT: CN II-XII grossly intact, PERRL 3mm, EOMI b/l, face symmetric  Cardiovascular: RRR, normal S1 and S2   Respiratory: lungs CTAB, no wheezing, rhonchi, or crackles   GI: normoactive BS to auscultation, abd soft, NTND   Neuro: no aphasia, speech clear, no dysmetria, no pronator drift  strength 5/5 throughout all 4 extremities  sensation intact to light touch throughout   Extremities: distal pulses 2+ x4     TUBES/LINES:  [] Campuzano  [] A-line  [] Lumbar Drain  [] Wound Drains  [] NGT   [] EVD   [] CVC  [] Other      DIET:  [] NPO  [x] Mechanical  [] Tube feeds    LABS:                        11.1   14.35 )-----------( 72       ( 20 Jul 2023 05:30 )             32.7     07-20    129<L>  |  91<L>  |  12  ----------------------------<  197<H>  4.4   |  28  |  0.57    Ca    9.1      20 Jul 2023 05:30  Phos  1.6     07-20  Mg     1.8     07-20        Urinalysis Basic - ( 20 Jul 2023 05:30 )    Color: x / Appearance: x / SG: x / pH: x  Gluc: 197 mg/dL / Ketone: x  / Bili: x / Urobili: x   Blood: x / Protein: x / Nitrite: x   Leuk Esterase: x / RBC: x / WBC x   Sq Epi: x / Non Sq Epi: x / Bacteria: x          CAPILLARY BLOOD GLUCOSE      POCT Blood Glucose.: 203 mg/dL (20 Jul 2023 21:48)  POCT Blood Glucose.: 169 mg/dL (20 Jul 2023 16:01)  POCT Blood Glucose.: 256 mg/dL (20 Jul 2023 11:59)  POCT Blood Glucose.: 193 mg/dL (20 Jul 2023 06:47)      Drug Levels: [] N/A  Vancomycin Level, Trough: 5.9 ug/mL (07-19 @ 05:30)    CSF Analysis: [] N/A      Allergies    amoxicillin (Rash)    Intolerances        Home Medications:  acetaminophen 325 mg oral tablet: 2 tab(s) orally every 6 hours As needed Temp greater or equal to 38C (100.4F), Mild Pain (1 - 3) (17 Jul 2023 11:18)  fluticasone 50 mcg/inh nasal spray: 1 spray(s) nasal 2 times a day (18 Apr 2023 13:38)  hydrocortisone 1% topical cream: 1 Apply topically to affected area once a day (17 Jul 2023 11:18)  melatonin 3 mg oral tablet: 2 tab(s) orally once a day (at bedtime) (18 Apr 2023 13:38)  senna (sennosides) 8.6 mg oral tablet: 1 tab(s) orally once a day as needed for  constipation (18 Apr 2023 13:40)      MEDICATIONS:  MEDICATIONS  (STANDING):  atorvastatin 20 milliGRAM(s) Oral at bedtime  dexAMETHasone  Injectable 4 milliGRAM(s) IV Push every 12 hours  escitalopram 5 milliGRAM(s) Oral daily  fludroCORTISONE 0.1 milliGRAM(s) Oral daily  fluticasone propionate 50 MICROgram(s)/spray Nasal Spray 1 Spray(s) Both Nostrils two times a day  hydrocortisone 1% Cream 1 Application(s) Topical daily  insulin lispro (ADMELOG) corrective regimen sliding scale   SubCutaneous Before meals and at bedtime  levETIRAcetam  IVPB 1000 milliGRAM(s) IV Intermittent every 12 hours  melatonin 5 milliGRAM(s) Oral at bedtime  senna 2 Tablet(s) Oral at bedtime  sodium chloride 3 Gram(s) Oral every 6 hours  sucralfate 1 Gram(s) Oral every 12 hours    MEDICATIONS  (PRN):  acetaminophen     Tablet .. 650 milliGRAM(s) Oral every 6 hours PRN Temp greater or equal to 38C (100.4F), Mild Pain (1 - 3)  benzocaine/menthol Lozenge 1 Lozenge Oral every 4 hours PRN Sore Throat  ondansetron Injectable 4 milliGRAM(s) IV Push every 6 hours PRN Nausea and/or Vomiting      CULTURES:  Culture Results:   No growth at 1 day. (07-19 @ 18:36)  Culture Results:   Growth in aerobic bottle: Staphylococcus epidermidis  Susceptibility to follow.  Culture in progress (07-17 @ 18:48)      RADIOLOGY & ADDITIONAL TESTS:      ASSESSMENT:  Pt is a 69YO male with a past medical history of type 2 diabetes, hyperlipidemia, iron deficiency anemia, tracheal stenosis s/p tracheostomy (which was closed by ENT 7 days ago); glioblastoma s/p resection 1/27/2022, and Avastin treatment 3/2023 presenting with expressive aphasia, nuasea and vomiting x1 day. CT shows new hyperdensity in left frontal parietal - surgical site c/f disease progression. Na in the . Hospital course complicated by concern for possible sepsis, ID following now off abx, hemodynamically stable.     Plan:  Neuro:  -neuro q4/vitals q4hrs   -CTH 7/7: Interval development of a 13 mm hyperdense nodule along the   inferior margin of the resection cavity which may represent progression   of disease with hemorrhage. Repeat CTH stable, CTH 7/17 stable   -CTA 7/7: negative, CTA 7/17 stable  -CTP 7/17 stable.   -pain control prn  -Keppra increased to 1g BID  -Decadron 4mg BID  -c/w home Lexapro  -home ASA 81 held i/s/o thrombocytopenia   -MRI suspicious for recurrent tumor and hydro   -EEG 7/17: Occasional to frequent sharply contoured waves over left frontotemporal region d/c 7/19  -epilepsy consulted     Pulm:  -satting well on RA    Cardio:  --160    GI:  -CCD 1L fluid restriction  -bowel regimen  -sucralfate  -last BM 7/15    Renal:  -hyponatremic, Na 118 on admission  - off NS for pulm edema on CXR;off hypertonics since 7/15  -florinef 0.1mg daily, salt tabs 3q6  -voiding, sc prn   -dc'd urea powder 15g BID started per Renal d/t petechial rash     Endo:  -ISS  -A1c 5.7  -h/o T2DM: home Januvia held  -h/o HLD: c/w Atorvastatin 20mg    Heme:  -SCDs for DVT ppx, SQL held for thrombocytopenia   -heme consulted for thrombocytopenia- w/u sent, likely chronic thrombocytopenia d/t chemotherapy agent in past   -LE dopplers 7/8 negative  -CTA PE protocol for tachycardia: negative     ID:  -pan cx 7/7; aerobic bottles growing GS+ cocci in clusters   -vancomycin/meropenem empirically (7/17-19), d/c per ID and repeat blood cx 7/19    Dispo: regional status, full code, Home PT    D/w Dr. Dorado     DVT PROPHYLAXIS:  [x] Venodynes                                [x] Heparin/Lovenox      Assessment: present when checked     [] GCS   E   V   M     Heart Failure: [] Acute, [] acute on chronic, [] chronic   Heart Failure: [] Diastolic (HFpEF), [] Systolic (HRrEF), [] Combined (HFpEF and HFrEF), [] RHF, [] Pulm HTN, [] Other     [] EUGENIA, [] ATN, [] AIN, [] other   [] CKD1, [] CKD2, [] CKD3, [] CKD4, [] CKD5, [] ESRD     Encephalopathy: [] Metabolic, [] Hepatic, [] Toxic, [] Neurological, [] Other     Abnormal Nutritional Status: [] malnutrition (see nutrition note), []underweight: BMI <19, [] morbid obesity: BMI >40, [] Cachexia     [] Sepsis   [] Hypovolemic shock, [] Cardiogenic shock, [] Hemorrhagic shock, [] Neurogenic shock   [] Acute respiratory failure   [] Cerebral edema, [] Brain compression / herniation   [] Functional quadriplegia   [] Acute blood loss anemia

## 2023-07-21 NOTE — PROGRESS NOTE ADULT - ASSESSMENT
69yo M with PMHx glioblastoma s/p resection 1/27/2022, LD Avastin treatment 3/2023 cb chronic hyponatremia, type 2 diabetes, iron deficiency anemia, tracheal stenosis s/p tracheostomy (closed by ENT 7/1); presenting with altered mental status and weakness x2 days. Complicated hospital course. Patient was clinically stable for DC then developed acute deterioration on 7/17 due to SVT, fever, metabolic encephalopathy. MRI suspicious for recurrent tumor. Palliative consulted for complex medical decision making / symptom managment in the setting of advanced illness.

## 2023-07-22 NOTE — PROGRESS NOTE ADULT - SUBJECTIVE AND OBJECTIVE BOX
HPI:  Osiel Norwood is a 69YO male with a past medical history of type 2 diabetes, hyperlipidemia, iron deficiency anemia, tracheal stenosis s/p tracheostomy (which was closed by ENT 7 days ago); glioblastoma s/p resection 1/27/2022, and Avastin treatment 3/2023 presenting with altered mental status and weakness x2 days. Per family, they last saw him last night around 6pm when he was at baseline. Patient has intermittent expressive aphasia but is getting worse and now is persistent, and has intermittent nausea and vomiting since last night. Patient was supposed to receive an outpatient brain MRI on 7/25/2023.  Stroke code performed in  is negative for CVA. Head CT shows new hyperdensity in frontal parietal - surgical site. MRI is ordered to rule out tumor recurrence.   Per daughter at bedside, Entresto and Eliquis were stopped per his PC - cardiologist.      (07 Jul 2023 13:10)    OVERNIGHT EVENTS: 6pm BMP Na 122, 10pm BMP Na 131, urine osm 141  7/22: ANA LILIA ovn, neuro stable    Hospital course:   7/7: Admitted. Na 115 in ED with repeat 118, started 3% at 30cc/hr, and salt tabs 3q6, stability CTH in ED showing Interval development of a 13 mm hyperdense nodule along the   inferior margin of the resection cavity which may represent progression   of disease with hemorrhage, repeat CTH stable, urine studies ordered  7/8: Neuro stable, 12AM BMP Na114 3% increased to 40cc/hr, urine studies, pancx to r/o infection since pt is immunocompromised. Changed pantoprazole to sucralfate for GI ppx d/t thrombocytopenia. LE dopplers negative. DC'd 3%. ENT consulted to assess stoma, recommend follow up upon discharge with ENT, Repeat sodium 122. Restarted 3% at 30.  7/9: ANA LILIA o/n neuro stable. remains on 3%@30cc/hr. Started florinef, increased 3% to 50cc/hr. Increased 3% to 75cc/hr.    7/10: continued current 3% rate o/n. Na 127 --> 125, cont 3% @75cc/hr, f/u repeat Na this evening, likely stepdown tomorrow. Pend dispo home with home PT/OT when able. Heme consulted for thrombocytopenia  7/11: ANA LILIA o/n. Remains on 3% @75cc/hr. Decreased 3% to 50cc/hr. Started 1.2L fluid restriction. Repeat Na corrected 128  7/12: ANA LILIA overnight, remains on 3%, SDU from ICU  7/13: ANA LILIA overnight, neuro stable, on 3%@50, Am sodium 137, decreased 3% to 25cc/hr, repeat Na 138, decreased to 15cc/hr.  7/14: ANA LILIA overnight, neuro stable, remains on 3%@15cc/hr  7/15: ANA LILIA overnight. Neuro exam stable. Pt remains on 3% saline.Sodium 134 this AM remains on 3%@25. Per nephrology recs hypertonic Na d'ceed, Na tabs 2q6, URENA 15g BID, cont florinef/ fluid restriction.Per nephrology Na >130 ok when ready for d/c   7/16: ANA LILIA overnight, hypertonics d/c now on urea powder/1L fluid restriction/florinef and salt tabs per renal, hydrocortisone cream ordered for rash on back, rec for Home PT  7/17: ANA LILIA overnight, following Na, renal following, ENT saw for hoarse voice rec followup with CT surgery for possible dilation, pending Home PT. Patient developed sudden onset severe headache. Hyperventilating with increased work of breathing. Wheezing in all lung fields to auscultation. Neuro intact on exam. Duoneb x1 ordered. Dilaudid 0.25 IV given for pain control. Subsequently patient developed nausea with multiple episodes of vomiting. Hypertensive with SBP in the 190s. Given hydralazine 10mg IVP, Zofran. Stroke code and rapid response called. STAT labs sent. CTH/CTA/CTP completed. Tachycardic to the 180s, consistent with SVT, remained hypertensive. Given 10 of labetalol, SVT broke. Transferred to Telemetry. WBC 23.43, lactate 8.5. Given 1L fluid bolus. Pan culture sent. Started empirically on Vanc/Meropenem.   7/18: Put on eeg. Voiding while retaining urine, SC for 500cc. EEG +spikes, epilepsy consulted. CTA chest and CT A/P pending. Keppra increased 1g BID.   7/19: Cont EEG, trend Na, possible NGT today if not able to tolerate PO. Blood cx growing GS + cocci in clusters, f/u MRSA swab and pan cx, cont meropenem and vancomycin. Vanc trough in am. F/u urine studies.   7/20: Off EEG, neuro exam improved. ID following for concern for sepsis, likely aerobic blood cx bottle staph epi contaminant, cont monitoring off of abx, possible drug reaction as cause. F/u surveillance blood cx. Hyponatremia, repeat Na 129 overnight from 130, cont current regimen and f/u am labs and nephrology recs. AM na 129 continuing fluid restriction, nephro recommending restarting urena, but holding due to possible drug reaction previously.   7/21: neurologically stable, c/w fluid restriction, f/u AM Na, urine osm, urine Na, cortisol. Given 15mg of tolvaptan. Fluid restriction, florinef d/c'd per nephro recs. Held salt tabs for today. 6pm BMP Na 122, 10pm BMP Na 131, urine osm 141  7/22: ANA LILIA ovn, neuro stable     Vital Signs Last 24 Hrs  T(C): 36.6 (21 Jul 2023 22:05), Max: 36.9 (21 Jul 2023 18:01)  T(F): 97.8 (21 Jul 2023 22:05), Max: 98.4 (21 Jul 2023 18:01)  HR: 94 (22 Jul 2023 01:00) (64 - 126)  BP: 107/56 (22 Jul 2023 01:00) (107/56 - 144/83)  BP(mean): 76 (22 Jul 2023 01:00) (76 - 108)  RR: 16 (22 Jul 2023 01:00) (16 - 18)  SpO2: 95% (22 Jul 2023 01:00) (95% - 98%)    Parameters below as of 22 Jul 2023 01:00  Patient On (Oxygen Delivery Method): room air        I&O's Summary    20 Jul 2023 07:01  -  21 Jul 2023 07:00  --------------------------------------------------------  IN: 790 mL / OUT: 1520 mL / NET: -730 mL    21 Jul 2023 07:01  -  22 Jul 2023 03:13  --------------------------------------------------------  IN: 825 mL / OUT: 2280 mL / NET: -1455 mL        PHYSICAL EXAM:  General: NAD, pt is comfortably sitting up in bed, A&O x 2-3 ([place]), on RA  HEENT: CN II-XII grossly intact, PERRL 3mm, EOMI b/l, face symmetric  Cardiovascular: RRR, normal S1 and S2   Respiratory: lungs CTAB, no wheezing, rhonchi, or crackles   GI: normoactive BS to auscultation, abd soft, NTND   Neuro: no aphasia, speech clear, no dysmetria, no pronator drift  strength 5/5 throughout all 4 extremities  sensation intact to light touch throughout   Extremities: distal pulses 2+ x4     TUBES/LINES:  [] Campuzano  [] A-line  [] Lumbar Drain  [] Wound Drains  [] NGT   [] EVD   [] CVC  [] Other      DIET:  [] NPO  [x] Mechanical  [] Tube feeds    LABS:                        9.9    7.51  )-----------( 72       ( 21 Jul 2023 06:34 )             27.9     07-21    131<L>  |  93<L>  |  19  ----------------------------<  245<H>  3.5   |  26  |  0.67    Ca    8.8      21 Jul 2023 22:32  Phos  2.9     07-21  Mg     1.7     07-21        Urinalysis Basic - ( 21 Jul 2023 22:32 )    Color: x / Appearance: x / SG: x / pH: x  Gluc: 245 mg/dL / Ketone: x  / Bili: x / Urobili: x   Blood: x / Protein: x / Nitrite: x   Leuk Esterase: x / RBC: x / WBC x   Sq Epi: x / Non Sq Epi: x / Bacteria: x          CAPILLARY BLOOD GLUCOSE      POCT Blood Glucose.: 203 mg/dL (21 Jul 2023 21:52)  POCT Blood Glucose.: 285 mg/dL (21 Jul 2023 17:18)  POCT Blood Glucose.: 259 mg/dL (21 Jul 2023 11:20)  POCT Blood Glucose.: 180 mg/dL (21 Jul 2023 06:32)      Drug Levels: [] N/A  Vancomycin Level, Trough: 5.9 ug/mL (07-19 @ 05:30)    CSF Analysis: [] N/A      Allergies    amoxicillin (Rash)    Intolerances        Home Medications:  acetaminophen 325 mg oral tablet: 2 tab(s) orally every 6 hours As needed Temp greater or equal to 38C (100.4F), Mild Pain (1 - 3) (17 Jul 2023 11:18)  fluticasone 50 mcg/inh nasal spray: 1 spray(s) nasal 2 times a day (18 Apr 2023 13:38)  hydrocortisone 1% topical cream: 1 Apply topically to affected area once a day (17 Jul 2023 11:18)  melatonin 3 mg oral tablet: 2 tab(s) orally once a day (at bedtime) (18 Apr 2023 13:38)  senna (sennosides) 8.6 mg oral tablet: 1 tab(s) orally once a day as needed for  constipation (18 Apr 2023 13:40)      MEDICATIONS:  MEDICATIONS  (STANDING):  atorvastatin 20 milliGRAM(s) Oral at bedtime  dexAMETHasone  Injectable 4 milliGRAM(s) IV Push every 12 hours  escitalopram 5 milliGRAM(s) Oral daily  fluticasone propionate 50 MICROgram(s)/spray Nasal Spray 1 Spray(s) Both Nostrils two times a day  hydrocortisone 1% Cream 1 Application(s) Topical daily  insulin lispro (ADMELOG) corrective regimen sliding scale   SubCutaneous Before meals and at bedtime  levETIRAcetam  IVPB 1000 milliGRAM(s) IV Intermittent every 12 hours  melatonin 5 milliGRAM(s) Oral at bedtime  polyethylene glycol 3350 17 Gram(s) Oral daily  senna 2 Tablet(s) Oral at bedtime  sucralfate 1 Gram(s) Oral every 12 hours    MEDICATIONS  (PRN):  acetaminophen     Tablet .. 650 milliGRAM(s) Oral every 6 hours PRN Temp greater or equal to 38C (100.4F), Mild Pain (1 - 3)  benzocaine/menthol Lozenge 1 Lozenge Oral every 4 hours PRN Sore Throat  ondansetron Injectable 4 milliGRAM(s) IV Push every 6 hours PRN Nausea and/or Vomiting      CULTURES:  Culture Results:   No growth at 2 days. (07-19 @ 18:36)  Culture Results:   Growth in aerobic and anaerobic bottles: Staphylococcus epidermidis  Culture in progress (07-17 @ 18:48)      RADIOLOGY & ADDITIONAL TESTS:      ASSESSMENT:  Pt is a 69YO male with a past medical history of type 2 diabetes, hyperlipidemia, iron deficiency anemia, tracheal stenosis s/p tracheostomy (which was closed by ENT 7 days ago); glioblastoma s/p resection 1/27/2022, and Avastin treatment 3/2023 presenting with expressive aphasia, nuasea and vomiting x1 day. CT shows new hyperdensity in left frontal parietal - surgical site c/f disease progression. Na in the . Hospital course complicated by concern for possible sepsis, ID following now off abx, hemodynamically stable.     Plan:  Neuro:  -neuro q4/vitals q4hrs   -CTH 7/7: Interval development of a 13 mm hyperdense nodule along the   inferior margin of the resection cavity which may represent progression   of disease with hemorrhage. Repeat CTH stable, CTH 7/17 stable   -CTA 7/7: negative, CTA 7/17 stable  -CTP 7/17 stable.   -pain control prn  -Keppra increased to 1g BID  -Decadron 4mg BID  -c/w home Lexapro  -home ASA 81 held i/s/o thrombocytopenia   -MRI suspicious for recurrent tumor and hydro   -EEG 7/17: Occasional to frequent sharply contoured waves over left frontotemporal region d/c 7/19  -epilepsy consulted     Pulm:  -satting well on RA    Cardio:  --160    GI:  -CCD   -bowel regimen  -sucralfate  -last BM 7/15    Renal:  -hyponatremic, Na 118 on admission  - off NS for pulm edema on CXR;off hypertonics since 7/15  - salt tabs 3q6 held 7/21  - s/p tolvaptan 15mg 7/21  -voiding, sc prn   -dc'd urea powder 15g BID started per Renal d/t petechial rash     Endo:  -ISS  -A1c 5.7  -h/o T2DM: home Januvia held  -h/o HLD: c/w Atorvastatin 20mg    Heme:  -SCDs for DVT ppx, SQL held for thrombocytopenia   -heme consulted for thrombocytopenia- w/u sent, likely chronic thrombocytopenia d/t chemotherapy agent in past   -LE dopplers 7/8 negative  -CTA PE protocol for tachycardia: negative     ID:  -pan cx 7/7; aerobic bottles growing GS+ cocci in clusters   -vancomycin/meropenem empirically (7/17-19), d/c per ID and repeat blood cx 7/19    Dispo: regional status, full code, Home PT    D/w Dr. Dorado     DVT PROPHYLAXIS:  [x] Venodynes                                [x] Heparin/Lovenox      Assessment: present when checked     [] GCS   E   V   M     Heart Failure: [] Acute, [] acute on chronic, [] chronic   Heart Failure: [] Diastolic (HFpEF), [] Systolic (HRrEF), [] Combined (HFpEF and HFrEF), [] RHF, [] Pulm HTN, [] Other     [] EUGENIA, [] ATN, [] AIN, [] other   [] CKD1, [] CKD2, [] CKD3, [] CKD4, [] CKD5, [] ESRD     Encephalopathy: [] Metabolic, [] Hepatic, [] Toxic, [] Neurological, [] Other     Abnormal Nutritional Status: [] malnutrition (see nutrition note), []underweight: BMI <19, [] morbid obesity: BMI >40, [] Cachexia     [] Sepsis   [] Hypovolemic shock, [] Cardiogenic shock, [] Hemorrhagic shock, [] Neurogenic shock   [] Acute respiratory failure   [] Cerebral edema, [] Brain compression / herniation   [] Functional quadriplegia   [] Acute blood loss anemia

## 2023-07-22 NOTE — PROGRESS NOTE ADULT - ASSESSMENT
68M PMHx type 2 diabetes, HLD, JAGDISH, tracheal stenosis s/p tracheostomy (which was closed by ENT 7 days ago); glioblastoma s/p resection 1/27/2022, and Avastin treatment 3/2023 presenting with expressive aphasia, nuasea and vomiting x1 day. CT shows new hyperdensity in left frontal parietal - surgical site c/f disease progression.  Patient was clinically stable for DC then developed acute deterioration on 7/17 due to SVT, fever, metabolic encephalopathy    #Hyper density in left frontal parietal which may represent progression of disease.   -Management per primary team   -Keppra increased to 1 g BID on 7/18/23, c/w current regimen  -Continue Decadron 4mg q12  -ISS while on decadron  -Pt's ASA 81 held i/s/o thrombocytopenia     #SIRS with end organ damage  #Staph epi blood cultures  - febrile, leukocytosis and tachycardia with elevated lactic acid on 7/17  - CT C/A/P w/o infectious source  - blood cultures likely contaminant per ID, however now with 4/4 cultures positive and known to be drawn from separate sites by primary team, f/u surveillance cultures  - if any fever or repeat positive blood culture start Vanc and reconsult ID    #Hyponatremia   Suspected to be SIADH in the setting of intracranial pathology as well as SSRI use; sNa improving, 133 on 7/22/23 BW.   - Ongoing renal recs appreciated  - florinef tapering down, salt tabs 3gm q6   - trend BMP  - would avoid Urena due to possible drug reaction    #Rash  - per primary team started after URENA, will stop.  Rash resolved  - continue to monitor    #Tachycardia  Resolved  - developed SVT to 170s during rapid response, responded to IV labetalol with return to NSR.  - treat underlying cause, likely drug reaction and fever  - trops during HTN emergency/rapid response negative    #Type II Diabetes  -A1c 5.7%  -Pt is on Januvia at home   -Continue ISS, will add basal coverage based on requirements    #HLD  - Continue Atorvastatin 20mg daily     #Thrombocytopenia   -Pt's platelets are stable 70-100k  -Heme consulted for thrombocytopenia; Per Heme documentation the pt w/  history of thrombocytopenia with his baseline range  since the latter half of last year and that the pt's Pt's thrombocytopenia likely due to past treatment with temozolomide.   Per heme transfuse 1U plt if bleeding and platelet count <50k, if febrile and platelet count <20k,   Dopplers 7/8/23 negative for DVT    #Dispo:   -Pt is for Home PT pending medical improvement

## 2023-07-22 NOTE — PROGRESS NOTE ADULT - SUBJECTIVE AND OBJECTIVE BOX
Nephrology progress note    68Y M with no hx of CKD, Glioblastoma, chronic hyponatremia, on admission (7/7) found to have acute on chronic symptomatic hyponatremia with sNa of 115, patient appropriately managed by Neurology, sNa now fluctuating between 130-135    Allergies:  amoxicillin (Rash)    REVIEW OF SYSTEMS:  All other review of systems is negative unless indicated above.      PHYSICAL EXAM:  Constitutional: NAD  HEENT: anicteric sclera, oropharynx clear, MMM  Neck: No JVD  Respiratory: CTAB, no wheezes, rales or rhonchi  Cardiovascular: S1, S2, RRR  Gastrointestinal: BS+, soft, NT/ND  Extremities: No cyanosis or clubbing. No peripheral edema  Neurological: A/O x 3, no focal deficits  Skin: No rashes      Allergies:  amoxicillin (Rash)    Hospital Medications:   MEDICATIONS  (STANDING):  atorvastatin 20 milliGRAM(s) Oral at bedtime  dexAMETHasone  Injectable 4 milliGRAM(s) IV Push every 12 hours  escitalopram 5 milliGRAM(s) Oral daily  fluticasone propionate 50 MICROgram(s)/spray Nasal Spray 1 Spray(s) Both Nostrils two times a day  hydrocortisone 1% Cream 1 Application(s) Topical daily  insulin glargine Injectable (LANTUS) 10 Unit(s) SubCutaneous at bedtime  insulin lispro (ADMELOG) corrective regimen sliding scale   SubCutaneous Before meals and at bedtime  levETIRAcetam  IVPB 1000 milliGRAM(s) IV Intermittent every 12 hours  melatonin 5 milliGRAM(s) Oral at bedtime  polyethylene glycol 3350 17 Gram(s) Oral daily  senna 2 Tablet(s) Oral at bedtime  sodium chloride 3 Gram(s) Oral every 6 hours  sucralfate 1 Gram(s) Oral every 12 hours    VITALS:  T(F): 98.3 (07-22-23 @ 18:19), Max: 98.3 (07-22-23 @ 18:19)  HR: 100 (07-22-23 @ 15:20)  BP: 112/70 (07-22-23 @ 15:20)  RR: 16 (07-22-23 @ 15:20)  SpO2: 99% (07-22-23 @ 15:20)  Wt(kg): --    07-20 @ 07:01  -  07-21 @ 07:00  --------------------------------------------------------  IN: 790 mL / OUT: 1520 mL / NET: -730 mL    07-21 @ 07:01  -  07-22 @ 07:00  --------------------------------------------------------  IN: 825 mL / OUT: 2480 mL / NET: -1655 mL    07-22 @ 07:01  -  07-22 @ 20:26  --------------------------------------------------------  IN: 580 mL / OUT: 600 mL / NET: -20 mL      LABS:  07-22    133<L>  |  98  |  21  ----------------------------<  204<H>  4.2   |  23  |  0.69    Ca    9.1      22 Jul 2023 05:30  Phos  2.9     07-22  Mg     2.3     07-22                            12.1   10.49 )-----------( 91       ( 22 Jul 2023 05:30 )             37.2       Urine Studies:  Creatinine Trend: 0.69<--, 0.67<--, 0.60<--, 0.56<--, 0.57<--, 0.58<--  Urinalysis Basic - ( 22 Jul 2023 05:30 )    Color:  / Appearance:  / SG:  / pH:   Gluc: 204 mg/dL / Ketone:   / Bili:  / Urobili:    Blood:  / Protein:  / Nitrite:    Leuk Esterase:  / RBC:  / WBC    Sq Epi:  / Non Sq Epi:  / Bacteria:       Osmolality, Random Urine: 141 mosm/kg (07-22 @ 01:12)  Sodium, Random Urine: 120 mmol/L (07-21 @ 11:51)  Osmolality, Random Urine: 585 mosm/kg (07-21 @ 11:29)  Creatinine, Random Urine: 45 mg/dL (07-21 @ 11:29)  Osmolality, Random Urine: 508 mosm/kg (07-19 @ 04:03)  Sodium, Random Urine: 94 mmol/L (07-19 @ 04:03)  Osmolality, Random Urine: 741 mosm/kg (07-16 @ 09:27)  Sodium, Random Urine: 82 mmol/L (07-16 @ 09:27)    RADIOLOGY & ADDITIONAL STUDIES:

## 2023-07-22 NOTE — PROGRESS NOTE ADULT - SUBJECTIVE AND OBJECTIVE BOX
OVERNIGHT EVENTS:  -AM Cortisol level normal  -AM Na 133 (up from 131)  -ANA LILIA ON    SUBJECTIVE / INTERVAL HPI: Patient seen and examined at bedside. Patient has no major complaints at this time, including fevers, chills, NS, chest pain, shortness of breath, abdominal pain, nausea/vomiting. Reporting BMs. No changes in urinary habits.     VITAL SIGNS:  Vital Signs Last 24 Hrs  T(C): 36.3 (22 Jul 2023 09:00), Max: 36.9 (21 Jul 2023 18:01)  T(F): 97.3 (22 Jul 2023 09:00), Max: 98.4 (21 Jul 2023 18:01)  HR: 102 (22 Jul 2023 11:30) (72 - 126)  BP: 111/70 (22 Jul 2023 11:30) (107/56 - 123/73)  BP(mean): 85 (22 Jul 2023 11:30) (76 - 93)  RR: 16 (22 Jul 2023 11:30) (16 - 18)  SpO2: 94% (22 Jul 2023 11:30) (94% - 98%)    Parameters below as of 22 Jul 2023 11:30  Patient On (Oxygen Delivery Method): room air        PHYSICAL EXAM:    General: thin male, lying in bed; NAD  HEENT: NC/AT; PERRL, anicteric sclera; MMM  Neck: supple  Cardiovascular: +S1/S2; RRR  Respiratory: CTA B/L; no W/R/R  Gastrointestinal: soft, NT/ND; +BSx4  Extremities: WWP; no edema, clubbing or cyanosis  Vascular: 2+ radial, DP/PT pulses B/L  Neurological: AAOx2; no focal deficits    MEDICATIONS:  MEDICATIONS  (STANDING):  atorvastatin 20 milliGRAM(s) Oral at bedtime  dexAMETHasone  Injectable 4 milliGRAM(s) IV Push every 12 hours  escitalopram 5 milliGRAM(s) Oral daily  fluticasone propionate 50 MICROgram(s)/spray Nasal Spray 1 Spray(s) Both Nostrils two times a day  hydrocortisone 1% Cream 1 Application(s) Topical daily  insulin lispro (ADMELOG) corrective regimen sliding scale   SubCutaneous Before meals and at bedtime  levETIRAcetam  IVPB 1000 milliGRAM(s) IV Intermittent every 12 hours  melatonin 5 milliGRAM(s) Oral at bedtime  polyethylene glycol 3350 17 Gram(s) Oral daily  senna 2 Tablet(s) Oral at bedtime  sucralfate 1 Gram(s) Oral every 12 hours    MEDICATIONS  (PRN):  acetaminophen     Tablet .. 650 milliGRAM(s) Oral every 6 hours PRN Temp greater or equal to 38C (100.4F), Mild Pain (1 - 3)  benzocaine/menthol Lozenge 1 Lozenge Oral every 4 hours PRN Sore Throat  ondansetron Injectable 4 milliGRAM(s) IV Push every 6 hours PRN Nausea and/or Vomiting      ALLERGIES:  Allergies    amoxicillin (Rash)    Intolerances        LABS:                        12.1   10.49 )-----------( 91       ( 22 Jul 2023 05:30 )             37.2     07-22    133<L>  |  98  |  21  ----------------------------<  204<H>  4.2   |  23  |  0.69    Ca    9.1      22 Jul 2023 05:30  Phos  2.9     07-22  Mg     2.3     07-22        Urinalysis Basic - ( 22 Jul 2023 05:30 )    Color: x / Appearance: x / SG: x / pH: x  Gluc: 204 mg/dL / Ketone: x  / Bili: x / Urobili: x   Blood: x / Protein: x / Nitrite: x   Leuk Esterase: x / RBC: x / WBC x   Sq Epi: x / Non Sq Epi: x / Bacteria: x      CAPILLARY BLOOD GLUCOSE      POCT Blood Glucose.: 348 mg/dL (22 Jul 2023 11:27)      RADIOLOGY & ADDITIONAL TESTS: Reviewed.    ASSESSMENT:    PLAN:

## 2023-07-22 NOTE — PROGRESS NOTE ADULT - ASSESSMENT
68Y M with no hx of CKD, Glioblastoma, chronic hyponatremia, on admission (7/7) found to have acute on chronic symptomatic hyponatremia with sNa of 115, patient appropriately managed by Neurology, sNa fluctuating between 130-135    #Chronic asymptomatic hyponatremia   Recent Urine osm 508 and Suleman 94 7/19, likely SIADH given intracranial pathology along with pt on SSRI   TSH wnl  AM cortisol wnl  Of Fludrocortisone   Pt on Sodium Tabs and fluid restriction     Na trend: 125(21 @ 6:30 am)->131(21 @ 10:30 pm)->133 (today @ 5:30 am)  U osmo decreased s/p Vaptan 15 mg.    Plan:  - Give tolvaptan 15mg PRN if hyponatremia develops.   - May need to administer D5W if patient overcorrects.  - BMP now (ordered)  - Cont BMP daily for now  - Cont. fluid restriction   - Off florinef

## 2023-07-23 NOTE — PROGRESS NOTE ADULT - SUBJECTIVE AND OBJECTIVE BOX
HPI:  Osiel Norwood is a 69YO male with a past medical history of type 2 diabetes, hyperlipidemia, iron deficiency anemia, tracheal stenosis s/p tracheostomy (which was closed by ENT 7 days ago); glioblastoma s/p resection 1/27/2022, and Avastin treatment 3/2023 presenting with altered mental status and weakness x2 days. Per family, they last saw him last night around 6pm when he was at baseline. Patient has intermittent expressive aphasia but is getting worse and now is persistent, and has intermittent nausea and vomiting since last night. Patient was supposed to receive an outpatient brain MRI on 7/25/2023.  Stroke code performed in  is negative for CVA. Head CT shows new hyperdensity in frontal parietal - surgical site. MRI is ordered to rule out tumor recurrence.   Per daughter at bedside, Entresto and Eliquis were stopped per his PC - cardiologist.      (07 Jul 2023 13:10)    OVERNIGHT EVENTS: Lantus 10u at bedtime added. 9:30pm BMP per nephro Na 138  7/23: ANA LILIA ovn, neuro stable    Hospital course:   7/7: Admitted. Na 115 in ED with repeat 118, started 3% at 30cc/hr, and salt tabs 3q6, stability CTH in ED showing Interval development of a 13 mm hyperdense nodule along the   inferior margin of the resection cavity which may represent progression   of disease with hemorrhage, repeat CTH stable, urine studies ordered  7/8: Neuro stable, 12AM BMP Na114 3% increased to 40cc/hr, urine studies, pancx to r/o infection since pt is immunocompromised. Changed pantoprazole to sucralfate for GI ppx d/t thrombocytopenia. LE dopplers negative. DC'd 3%. ENT consulted to assess stoma, recommend follow up upon discharge with ENT, Repeat sodium 122. Restarted 3% at 30.  7/9: ANA LILIA o/n neuro stable. remains on 3%@30cc/hr. Started florinef, increased 3% to 50cc/hr. Increased 3% to 75cc/hr.    7/10: continued current 3% rate o/n. Na 127 --> 125, cont 3% @75cc/hr, f/u repeat Na this evening, likely stepdown tomorrow. Pend dispo home with home PT/OT when able. Heme consulted for thrombocytopenia  7/11: ANA LILIA o/n. Remains on 3% @75cc/hr. Decreased 3% to 50cc/hr. Started 1.2L fluid restriction. Repeat Na corrected 128  7/12: ANA LILIA overnight, remains on 3%, SDU from ICU  7/13: ANA LILIA overnight, neuro stable, on 3%@50, Am sodium 137, decreased 3% to 25cc/hr, repeat Na 138, decreased to 15cc/hr.  7/14: ANA LILIA overnight, neuro stable, remains on 3%@15cc/hr  7/15: ANA LILIA overnight. Neuro exam stable. Pt remains on 3% saline.Sodium 134 this AM remains on 3%@25. Per nephrology recs hypertonic Na d'ceed, Na tabs 2q6, URENA 15g BID, cont florinef/ fluid restriction.Per nephrology Na >130 ok when ready for d/c   7/16: ANA LILIA overnight, hypertonics d/c now on urea powder/1L fluid restriction/florinef and salt tabs per renal, hydrocortisone cream ordered for rash on back, rec for Home PT  7/17: ANA LILIA overnight, following Na, renal following, ENT saw for hoarse voice rec followup with CT surgery for possible dilation, pending Home PT. Patient developed sudden onset severe headache. Hyperventilating with increased work of breathing. Wheezing in all lung fields to auscultation. Neuro intact on exam. Duoneb x1 ordered. Dilaudid 0.25 IV given for pain control. Subsequently patient developed nausea with multiple episodes of vomiting. Hypertensive with SBP in the 190s. Given hydralazine 10mg IVP, Zofran. Stroke code and rapid response called. STAT labs sent. CTH/CTA/CTP completed. Tachycardic to the 180s, consistent with SVT, remained hypertensive. Given 10 of labetalol, SVT broke. Transferred to Telemetry. WBC 23.43, lactate 8.5. Given 1L fluid bolus. Pan culture sent. Started empirically on Vanc/Meropenem.   7/18: Put on eeg. Voiding while retaining urine, SC for 500cc. EEG +spikes, epilepsy consulted. CTA chest and CT A/P pending. Keppra increased 1g BID.   7/19: Cont EEG, trend Na, possible NGT today if not able to tolerate PO. Blood cx growing GS + cocci in clusters, f/u MRSA swab and pan cx, cont meropenem and vancomycin. Vanc trough in am. F/u urine studies.   7/20: Off EEG, neuro exam improved. ID following for concern for sepsis, likely aerobic blood cx bottle staph epi contaminant, cont monitoring off of abx, possible drug reaction as cause. F/u surveillance blood cx. Hyponatremia, repeat Na 129 overnight from 130, cont current regimen and f/u am labs and nephrology recs. AM na 129 continuing fluid restriction, nephro recommending restarting urena, but holding due to possible drug reaction previously.   7/21: neurologically stable, c/w fluid restriction, f/u AM Na, urine osm, urine Na, cortisol. Given 15mg of tolvaptan. Fluid restriction, florinef d/c'd per nephro recs. Held salt tabs for today. 6pm BMP Na 122, 10pm BMP Na 131, urine osm 141  7/22: ANA LILIA ovn, neuro stable. AM Na 133. Restarted salt tabs 3q6 and 1L fluid restriction. Lantus 10u at bedtime added. 9:30pm BMP per nephro Na 138  7/23: ANA LILIA ovn, neuro stable    Vital Signs Last 24 Hrs  T(C): 36.8 (22 Jul 2023 22:17), Max: 36.8 (22 Jul 2023 18:19)  T(F): 98.3 (22 Jul 2023 22:17), Max: 98.3 (22 Jul 2023 18:19)  HR: 100 (22 Jul 2023 20:45) (100 - 110)  BP: 116/73 (22 Jul 2023 20:45) (100/63 - 119/70)  BP(mean): 90 (22 Jul 2023 20:45) (78 - 90)  RR: 16 (22 Jul 2023 20:45) (16 - 17)  SpO2: 93% (22 Jul 2023 20:45) (93% - 99%)    Parameters below as of 22 Jul 2023 20:45  Patient On (Oxygen Delivery Method): room air        I&O's Summary    21 Jul 2023 07:01  -  22 Jul 2023 07:00  --------------------------------------------------------  IN: 825 mL / OUT: 2480 mL / NET: -1655 mL    22 Jul 2023 07:01  -  23 Jul 2023 02:28  --------------------------------------------------------  IN: 580 mL / OUT: 600 mL / NET: -20 mL        PHYSICAL EXAM:  General: NAD, pt is comfortably sitting up in bed, A&O x 2-3 (Dutch), on RA  HEENT: CN II-XII grossly intact, PERRL 3mm, EOMI b/l, face symmetric  Cardiovascular: RRR, normal S1 and S2   Respiratory: lungs CTAB, no wheezing, rhonchi, or crackles   GI: normoactive BS to auscultation, abd soft, NTND   Neuro: no aphasia, speech clear, no dysmetria, no pronator drift  strength 5/5 throughout all 4 extremities  sensation intact to light touch throughout   Extremities: distal pulses 2+ x4     TUBES/LINES:  [] Campuzano  [] A-line  [] Lumbar Drain  [] Wound Drains  [] NGT   [] EVD   [] CVC  [] Other      DIET:  [] NPO  [x] Mechanical  [] Tube feeds    LABS:                        12.1   10.49 )-----------( 91       ( 22 Jul 2023 05:30 )             37.2     07-22    138  |  101  |  23  ----------------------------<  217<H>  3.8   |  26  |  0.65    Ca    8.9      22 Jul 2023 20:38  Phos  2.9     07-22  Mg     2.3     07-22        Urinalysis Basic - ( 22 Jul 2023 20:38 )    Color: x / Appearance: x / SG: x / pH: x  Gluc: 217 mg/dL / Ketone: x  / Bili: x / Urobili: x   Blood: x / Protein: x / Nitrite: x   Leuk Esterase: x / RBC: x / WBC x   Sq Epi: x / Non Sq Epi: x / Bacteria: x          CAPILLARY BLOOD GLUCOSE      POCT Blood Glucose.: 190 mg/dL (22 Jul 2023 21:19)  POCT Blood Glucose.: 207 mg/dL (22 Jul 2023 16:22)  POCT Blood Glucose.: 348 mg/dL (22 Jul 2023 11:27)  POCT Blood Glucose.: 202 mg/dL (22 Jul 2023 07:22)  POCT Blood Glucose.: 213 mg/dL (22 Jul 2023 06:15)      Drug Levels: [] N/A  Vancomycin Level, Trough: 5.9 ug/mL (07-19 @ 05:30)    CSF Analysis: [] N/A      Allergies    amoxicillin (Rash)    Intolerances        Home Medications:  acetaminophen 325 mg oral tablet: 2 tab(s) orally every 6 hours As needed Temp greater or equal to 38C (100.4F), Mild Pain (1 - 3) (17 Jul 2023 11:18)  fluticasone 50 mcg/inh nasal spray: 1 spray(s) nasal 2 times a day (18 Apr 2023 13:38)  hydrocortisone 1% topical cream: 1 Apply topically to affected area once a day (17 Jul 2023 11:18)  melatonin 3 mg oral tablet: 2 tab(s) orally once a day (at bedtime) (18 Apr 2023 13:38)  senna (sennosides) 8.6 mg oral tablet: 1 tab(s) orally once a day as needed for  constipation (18 Apr 2023 13:40)      MEDICATIONS:  MEDICATIONS  (STANDING):  atorvastatin 20 milliGRAM(s) Oral at bedtime  dexAMETHasone  Injectable 4 milliGRAM(s) IV Push every 12 hours  escitalopram 5 milliGRAM(s) Oral daily  fluticasone propionate 50 MICROgram(s)/spray Nasal Spray 1 Spray(s) Both Nostrils two times a day  hydrocortisone 1% Cream 1 Application(s) Topical daily  insulin glargine Injectable (LANTUS) 10 Unit(s) SubCutaneous at bedtime  insulin lispro (ADMELOG) corrective regimen sliding scale   SubCutaneous Before meals and at bedtime  levETIRAcetam  IVPB 1000 milliGRAM(s) IV Intermittent every 12 hours  melatonin 5 milliGRAM(s) Oral at bedtime  polyethylene glycol 3350 17 Gram(s) Oral daily  senna 2 Tablet(s) Oral at bedtime  sodium chloride 3 Gram(s) Oral every 6 hours  sucralfate 1 Gram(s) Oral every 12 hours    MEDICATIONS  (PRN):  acetaminophen     Tablet .. 650 milliGRAM(s) Oral every 6 hours PRN Temp greater or equal to 38C (100.4F), Mild Pain (1 - 3)  benzocaine/menthol Lozenge 1 Lozenge Oral every 4 hours PRN Sore Throat  ondansetron Injectable 4 milliGRAM(s) IV Push every 6 hours PRN Nausea and/or Vomiting      CULTURES:  Culture Results:   No growth at 3 days. (07-19 @ 18:36)  Culture Results:   Growth in aerobic and anaerobic bottles: Staphylococcus epidermidis (07-17 @ 18:48)      RADIOLOGY & ADDITIONAL TESTS:      ASSESSMENT:  Pt is a 69YO male with a past medical history of type 2 diabetes, hyperlipidemia, iron deficiency anemia, tracheal stenosis s/p tracheostomy (which was closed by ENT 7 days ago); glioblastoma s/p resection 1/27/2022, and Avastin treatment 3/2023 presenting with expressive aphasia, nuasea and vomiting x1 day. CT shows new hyperdensity in left frontal parietal - surgical site c/f disease progression. Na in the . Hospital course complicated by concern for possible sepsis, ID following now off abx, hemodynamically stable.     Plan:  Neuro:  -neuro q4/vitals q4hrs   -CTH 7/7: Interval development of a 13 mm hyperdense nodule along the   inferior margin of the resection cavity which may represent progression   of disease with hemorrhage. Repeat CTH stable, CTH 7/17 stable   -CTA 7/7: negative, CTA 7/17 stable  -CTP 7/17 stable.   -pain control prn  -Keppra increased to 1g BID  -Decadron 4mg BID  -c/w home Lexapro  -home ASA 81 held i/s/o thrombocytopenia   -MRI suspicious for recurrent tumor and hydro   -EEG 7/17: Occasional to frequent sharply contoured waves over left frontotemporal region d/c 7/19  -epilepsy consulted     Pulm:  -satting well on RA    Cardio:  --160    GI:  -CCD w/ 1L fluid restriction  -bowel regimen  -sucralfate  -last BM 7/15    Renal:  -hyponatremic, Na 118 on admission  - off NS for pulm edema on CXR;off hypertonics since 7/15  - salt tabs 3q6   - s/p tolvaptan 15mg 7/21  -voiding, sc prn   -dc'd urea powder 15g BID started per Renal d/t petechial rash     Endo:  -ISS  -A1c 5.7  -h/o T2DM: home Januvia held  -h/o HLD: c/w Atorvastatin 20mg    Heme:  -SCDs for DVT ppx, SQL held for thrombocytopenia   -heme consulted for thrombocytopenia- w/u sent, likely chronic thrombocytopenia d/t chemotherapy agent in past   -LE dopplers 7/8 negative  -CTA PE protocol for tachycardia: negative     ID:  -pan cx 7/7; aerobic bottles growing GS+ cocci in clusters   -vancomycin/meropenem empirically (7/17-19), d/c per ID and repeat blood cx 7/19    Dispo: regional status, full code, AR    D/w Dr. Dorado     DVT PROPHYLAXIS:  [x] Venodynes                                [x] Heparin/Lovenox    Assessment: present when checked     [] GCS   E   V   M     Heart Failure: [] Acute, [] acute on chronic, [] chronic   Heart Failure: [] Diastolic (HFpEF), [] Systolic (HRrEF), [] Combined (HFpEF and HFrEF), [] RHF, [] Pulm HTN, [] Other     [] EUGENIA, [] ATN, [] AIN, [] other   [] CKD1, [] CKD2, [] CKD3, [] CKD4, [] CKD5, [] ESRD     Encephalopathy: [] Metabolic, [] Hepatic, [] Toxic, [] Neurological, [] Other     Abnormal Nutritional Status: [] malnutrition (see nutrition note), []underweight: BMI <19, [] morbid obesity: BMI >40, [] Cachexia     [] Sepsis   [] Hypovolemic shock, [] Cardiogenic shock, [] Hemorrhagic shock, [] Neurogenic shock   [] Acute respiratory failure   [] Cerebral edema, [] Brain compression / herniation   [] Functional quadriplegia   [] Acute blood loss anemia

## 2023-07-23 NOTE — CHART NOTE - NSCHARTNOTEFT_GEN_A_CORE
: ANA LILIA ovn, neuro stable. Na stable, discussed with nephrology can cont tolvaptan 15mg daily PRN for hyponatremia, only needed for hypoNa, can discontinue fluid restriction. Pt evaluated at bedside after nurse noted patient to have some expressive aphasia and perseverating; patient oriented to self, unable to say hospital or year, following all commands and DUMONT 5/5 strength, no drift, no facial droop, perseverating on pt's  when asked other questions. Pt afebrile, vitals stable, cont keppra 1g BID, cont decadron 4mg BID, discussed with Dr. Dorado, defer CTH at this time and monitor clinically. . Lantus increased to 12u at bedtime.

## 2023-07-23 NOTE — PROGRESS NOTE ADULT - SUBJECTIVE AND OBJECTIVE BOX
OVERNIGHT EVENTS:  - Started Lantus 10 qHS, with 16 u ISS over the last 24 hrs   - ANA LILIA ON     SUBJECTIVE / INTERVAL HPI: Patient seen and examined at bedside. Patient with no complaints at this time. Denies any fevers, chills, NS, chest pain, shortness of breath, cough, nausea/vomiting, abdominal pain, changes in urinary or bowel habits.     VITAL SIGNS:  Vital Signs Last 24 Hrs  T(C): 36.6 (23 Jul 2023 09:00), Max: 36.8 (22 Jul 2023 18:19)  T(F): 97.8 (23 Jul 2023 09:00), Max: 98.3 (22 Jul 2023 18:19)  HR: 98 (23 Jul 2023 08:29) (90 - 110)  BP: 109/74 (23 Jul 2023 08:29) (100/63 - 116/73)  BP(mean): 87 (23 Jul 2023 08:29) (77 - 90)  RR: 16 (23 Jul 2023 08:29) (16 - 18)  SpO2: 97% (23 Jul 2023 08:29) (93% - 99%)    Parameters below as of 23 Jul 2023 08:29  Patient On (Oxygen Delivery Method): room air        PHYSICAL EXAM:    General: thin male, lying in bed; NAD  HEENT: NC/AT; PERRL, anicteric sclera; MMM  Neck: supple  Cardiovascular: +S1/S2; RRR  Respiratory: CTA B/L; no W/R/R  Gastrointestinal: soft, NT/ND; +BSx4  Extremities: WWP; no edema, clubbing or cyanosis  Vascular: 2+ radial, DP/PT pulses B/L  Neurological: AAOx2; no focal deficits    MEDICATIONS:  MEDICATIONS  (STANDING):  atorvastatin 20 milliGRAM(s) Oral at bedtime  dexAMETHasone  Injectable 4 milliGRAM(s) IV Push every 12 hours  escitalopram 5 milliGRAM(s) Oral daily  fluticasone propionate 50 MICROgram(s)/spray Nasal Spray 1 Spray(s) Both Nostrils two times a day  hydrocortisone 1% Cream 1 Application(s) Topical daily  insulin glargine Injectable (LANTUS) 10 Unit(s) SubCutaneous at bedtime  insulin lispro (ADMELOG) corrective regimen sliding scale   SubCutaneous Before meals and at bedtime  levETIRAcetam  IVPB 1000 milliGRAM(s) IV Intermittent every 12 hours  magnesium sulfate  IVPB 2 Gram(s) IV Intermittent once  melatonin 5 milliGRAM(s) Oral at bedtime  polyethylene glycol 3350 17 Gram(s) Oral daily  senna 2 Tablet(s) Oral at bedtime  sodium chloride 3 Gram(s) Oral every 6 hours  sucralfate 1 Gram(s) Oral every 12 hours    MEDICATIONS  (PRN):  acetaminophen     Tablet .. 650 milliGRAM(s) Oral every 6 hours PRN Temp greater or equal to 38C (100.4F), Mild Pain (1 - 3)  benzocaine/menthol Lozenge 1 Lozenge Oral every 4 hours PRN Sore Throat  ondansetron Injectable 4 milliGRAM(s) IV Push every 6 hours PRN Nausea and/or Vomiting      ALLERGIES:  Allergies    amoxicillin (Rash)    Intolerances        LABS:                        11.1   6.48  )-----------( 121      ( 23 Jul 2023 05:30 )             32.8     07-23    137  |  102  |  22  ----------------------------<  193<H>  3.9   |  28  |  0.67    Ca    8.2<L>      23 Jul 2023 05:30  Phos  3.1     07-23  Mg     1.8     07-23        Urinalysis Basic - ( 23 Jul 2023 05:30 )    Color: x / Appearance: x / SG: x / pH: x  Gluc: 193 mg/dL / Ketone: x  / Bili: x / Urobili: x   Blood: x / Protein: x / Nitrite: x   Leuk Esterase: x / RBC: x / WBC x   Sq Epi: x / Non Sq Epi: x / Bacteria: x      CAPILLARY BLOOD GLUCOSE      POCT Blood Glucose.: 195 mg/dL (23 Jul 2023 06:19)      RADIOLOGY & ADDITIONAL TESTS: Reviewed.    ASSESSMENT:    PLAN:

## 2023-07-23 NOTE — PROGRESS NOTE ADULT - SUBJECTIVE AND OBJECTIVE BOX
HPI: Osiel Norwood is a 69YO male with a past medical history of type 2 diabetes, hyperlipidemia, iron deficiency anemia, tracheal stenosis s/p tracheostomy (which was closed by ENT 7 days ago); glioblastoma s/p resection 2022, and Avastin treatment 3/2023 presenting with altered mental status and weakness x2 days. Per family, they last saw him last night around 6pm when he was at baseline. Patient has intermittent expressive aphasia but is getting worse and now is persistent, and has intermittent nausea and vomiting since last night. Patient was supposed to receive an outpatient brain MRI on 2023.  Stroke code performed in  is negative for CVA. Head CT shows new hyperdensity in frontal parietal - surgical site. MRI is ordered to rule out tumor recurrence.   Per daughter at bedside, Entresto and Eliquis were stopped per his PC - cardiologist.      (2023 13:10)    SUBJECT: Patient seen at bedside with family. Patient endorses slight increased anxiety overnight with PSVT in the 160s. Patient was comforted and bolus IV Lactated Ringers was given.    HOSPITAL COURSE:  : Admitted. Na 115 in ED with repeat 118, started 3% at 30cc/hr, and salt tabs 3q6, stability CTH in ED showing Interval development of a 13 mm hyperdense nodule along the   inferior margin of the resection cavity which may represent progression   of disease with hemorrhage, repeat CTH stable, urine studies ordered  : Neuro stable, 12AM BMP Na114 3% increased to 40cc/hr, urine studies, pancx to r/o infection since pt is immunocompromised. Changed pantoprazole to sucralfate for GI ppx d/t thrombocytopenia. LE dopplers negative. DC'd 3%. ENT consulted to assess stoma, recommend follow up upon discharge with ENT, Repeat sodium 122. Restarted 3% at 30.  : ANA LILIA o/n neuro stable. remains on 3%@30cc/hr. Started florinef, increased 3% to 50cc/hr. Increased 3% to 75cc/hr.    7/10: continued current 3% rate o/n. Na 127 --> 125, cont 3% @75cc/hr, f/u repeat Na this evening, likely stepdown tomorrow. Pend dispo home with home PT/OT when able. Heme consulted for thrombocytopenia  : ANA LILIA o/n. Remains on 3% @75cc/hr. Decreased 3% to 50cc/hr. Started 1.2L fluid restriction. Repeat Na corrected 128  : ANA LILIA overnight, remains on 3%, SDU from ICU  : ANA LILIA overnight, neuro stable, on 3%@50, Am sodium 137, decreased 3% to 25cc/hr, repeat Na 138, decreased to 15cc/hr.  : ANA LILIA overnight, neuro stable, remains on 3%@15cc/hr  7/15: ANA LILIA overnight. Neuro exam stable. Pt remains on 3% saline.Sodium 134 this AM remains on 3%@25. Per nephrology recs hypertonic Na d'ceed, Na tabs 2q6, URENA 15g BID, cont florinef/ fluid restriction.Per nephrology Na >130 ok when ready for d/c   : ANA LILIA overnight, hypertonics d/c now on urea powder/1L fluid restriction/florinef and salt tabs per renal, hydrocortisone cream ordered for rash on back, rec for Home PT  : ANA LILIA overnight, following Na, renal following, ENT saw for hoarse voice rec followup with CT surgery for possible dilation, pending Home PT. Patient developed sudden onset severe headache. Hyperventilating with increased work of breathing. Wheezing in all lung fields to auscultation. Neuro intact on exam. Duoneb x1 ordered. Dilaudid 0.25 IV given for pain control. Subsequently patient developed nausea with multiple episodes of vomiting. Hypertensive with SBP in the 190s. Given hydralazine 10mg IVP, Zofran. Stroke code and rapid response called. STAT labs sent. CTH/CTA/CTP completed. Tachycardic to the 180s, consistent with SVT, remained hypertensive. Given 10 of labetalol, SVT broke. Transferred to Telemetry. WBC 23.43, lactate 8.5. Given 1L fluid bolus. Pan culture sent. Started empirically on Vanc/Meropenem.   : Put on eeg. Voiding while retaining urine, SC for 500cc. EEG +spikes, epilepsy consulted. CTA chest and CT A/P pending. Keppra increased 1g BID.   : Cont EEG, trend Na, possible NGT today if not able to tolerate PO. Blood cx growing GS + cocci in clusters, f/u MRSA swab and pan cx, cont meropenem and vancomycin. Vanc trough in am. F/u urine studies.   : Off EEG, neuro exam improved. ID following for concern for sepsis, likely aerobic blood cx bottle staph epi contaminant, cont monitoring off of abx, possible drug reaction as cause. F/u surveillance blood cx. Hyponatremia, repeat Na 129 overnight from 130, cont current regimen and f/u am labs and nephrology recs. AM na 129 continuing fluid restriction, nephro recommending restarting urena, but holding due to possible drug reaction previously.   : neurologically stable, c/w fluid restriction, f/u AM Na, urine osm, urine Na, cortisol. Given 15mg of tolvaptan. Fluid restriction, florinef d/c'd per nephro recs. Held salt tabs for today. 6pm BMP Na 122, 10pm BMP Na 131, urine osm 141  : ANA LILIA ovn, neuro stable. AM Na 133. Restarted salt tabs 3q6 and 1L fluid restriction. Lantus 10u at bedtime added. 9:30pm BMP per nephro Na 138  : ANA LILIA ovn, neuro stable. Na stable, discussed with nephrology can cont tolvaptan 15mg daily PRN for hyponatremia, only needed for hypoNa, can discontinue fluid restriction. Pt evaluated at bedside after nurse noted patient to have some expressive aphasia and perseverating; patient oriented to self, unable to say hospital or year, following all commands and DUMONT 5/5 strength, no drift, no facial droop, perseverating on pt's  when asked other questions. Pt afebrile, vitals stable, cont keppra 1g BID, cont decadron 4mg BID, discussed with Dr. Dorado, defer CTH at this time and monitor clinically. . Lantus increased to 12u at bedtime.     Vital Signs Last 24 Hrs  T(C): 36.8 (2023 18:17), Max: 36.8 (2023 18:17)  T(F): 98.2 (2023 18:17), Max: 98.2 (2023 18:17)  HR: 102 (2023 00:32) (90 - 102)  BP: 119/59 (2023 00:32) (102/59 - 119/59)  BP(mean): 84 (2023 00:32) (77 - 89)  RR: 18 (2023 00:32) (16 - 18)  SpO2: 93% (2023 00:32) (93% - 98%)    Parameters below as of 2023 00:32  Patient On (Oxygen Delivery Method): room air    I&O's Summary    2023 07:01  -  2023 07:00  --------------------------------------------------------  IN: 680 mL / OUT: 1000 mL / NET: -320 mL    2023 07:01  -  2023 01:46  --------------------------------------------------------  IN: 0 mL / OUT: 850 mL / NET: -850 mL    PHYSICAL EXAM:  General: Patient laying in bed. A&Ox3 (year with choices) on Room Air.  HEENT: 4mm pupils equal and reactive to light bilaterally, Extraocular muscles intact bilaterally.  Cardiovascular: Clear S1 and S2 without murmurs, gallops or rubs.  Respiratory: Clear to ausculation bilaterally without wheezing, rhonchi or rales.  GI: Soft, nontender, nondistended. Positive bowel sounds in all four quadrants.  Neuro: CN II-XII grossly intact. Strength 4+/5 upper and lower extremities bilaterally. Opens eyes spontaneously, and follows commands. Speech fluent.  Extremities: 2+ Dorsalis Pedis and Posterior Tibial pulses bilaterally.    TUBES/LINES:  [] Campuzano  [] Wound Drains  [] Others    DIET:  [] NPO  [] Mechanical  [] Tube feeds    LABS:                        11.1   6.48  )-----------( 121      ( 2023 05:30 )             32.8     07-    137  |  104  |  21  ----------------------------<  225<H>  3.7   |  24  |  0.61    Ca    8.4      2023 18:44  Phos  3.1     -  Mg     1.8         Urinalysis Basic - ( 2023 18:44 )    Color: x / Appearance: x / SG: x / pH: x  Gluc: 225 mg/dL / Ketone: x  / Bili: x / Urobili: x   Blood: x / Protein: x / Nitrite: x   Leuk Esterase: x / RBC: x / WBC x   Sq Epi: x / Non Sq Epi: x / Bacteria: x    CAPILLARY BLOOD GLUCOSE    POCT Blood Glucose.: 156 mg/dL (2023 22:10)  POCT Blood Glucose.: 270 mg/dL (2023 16:03)  POCT Blood Glucose.: 199 mg/dL (2023 11:31)  POCT Blood Glucose.: 195 mg/dL (2023 06:19)    Drug Levels: Vancomycin Level, Trough: 5.9 ug/mL ( @ 05:30)    Allergies: Amoxicillin (Rash)    MEDICATIONS:  Neuro:  acetaminophen     Tablet .. 650 milliGRAM(s) Oral every 6 hours PRN  escitalopram 5 milliGRAM(s) Oral daily  levETIRAcetam  IVPB 1000 milliGRAM(s) IV Intermittent every 12 hours  melatonin 5 milliGRAM(s) Oral at bedtime  ondansetron Injectable 4 milliGRAM(s) IV Push every 6 hours PRN    OTHER:  atorvastatin 20 milliGRAM(s) Oral at bedtime  benzocaine/menthol Lozenge 1 Lozenge Oral every 4 hours PRN  dexAMETHasone  Injectable 4 milliGRAM(s) IV Push every 12 hours  fluticasone propionate 50 MICROgram(s)/spray Nasal Spray 1 Spray(s) Both Nostrils two times a day  hydrocortisone 1% Cream 1 Application(s) Topical daily  insulin glargine Injectable (LANTUS) 14 Unit(s) SubCutaneous at bedtime  insulin lispro (ADMELOG) corrective regimen sliding scale   SubCutaneous Before meals and at bedtime  polyethylene glycol 3350 17 Gram(s) Oral daily  senna 2 Tablet(s) Oral at bedtime  sucralfate 1 Gram(s) Oral every 12 hours    IVF:  sodium chloride 3 Gram(s) Oral every 6 hours    CULTURES:  Culture Results:   No growth at 4 days. ( @ 18:36)  Culture Results:   Growth in aerobic and anaerobic bottles: Staphylococcus epidermidis ( @ 18:48)    ASSESSMENT:   Pt is a 69YO male with a past medical history of type 2 diabetes, hyperlipidemia, iron deficiency anemia, tracheal stenosis s/p tracheostomy (which was closed by ENT 7 days ago); glioblastoma s/p resection 2022, and Avastin treatment 3/2023 presenting with expressive aphasia, nuasea and vomiting x1 day. CT shows new hyperdensity in left frontal parietal - surgical site c/f disease progression. Na in the E 118. Hospital course complicated by concern for possible sepsis, ID following now off abx, hemodynamically stable.     Plan:  Neuro:  -neuro q4/vitals q4hrs   -CTH : Interval development of a 13 mm hyperdense nodule along the   inferior margin of the resection cavity which may represent progression   of disease with hemorrhage. Repeat CTH stable, CTH  stable   -CTA : negative, CTA  stable  -CTP  stable.   -pain control prn  -Keppra increased to 1g BID 2023  -Decadron 4mg BID  -c/w home Lexapro 5mg  -home ASA 81 held i/s/o thrombocytopenia   -MRI suspicious for recurrent tumor and hydro   -EEG : Occasional to frequent sharply contoured waves over left frontotemporal region d/c   -epilepsy consulted   -palliative following     Pulm:  -satting well on RA    Cardio:  --160    GI:  - CCD (dc'd fluid restrict)  -bowel regimen  -sucralfate  -last BM 7/15    Renal:  - Hyponatremic (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 as well as SSRI use), improving: Na 118 on admission -> 137 on .  - Trend BMP  - off NS for pulm edema on CXR; off hypertonics since 7/15  - salt tabs 3q6   - 1L fluid restriction   - s/p tolvaptan 15mg   - May need to administer D5W if patient overcorrects  -voiding, sc prn   -dc'd urea powder 15g BID started per Renal d/t petechial rash     Endo:  -ISS while on Decadron  -A1c 5.7  -lantus 14u at bedtime   -h/o T2DM: home Januvia held  -h/o HLD: c/w Atorvastatin 20mg    Heme:  -SCDs for DVT ppx, SQL and Aspirin 81 held for thrombocytopenia   -heme consulted for thrombocytopenia- w/u sent, likely chronic thrombocytopenia d/t chemotherapy agent in past   -Per heme transfuse 1U plt if bleeding and platelet count <50k, if febrile and platelet count <20k  -LE dopplers  negative  -CTA PE protocol for tachycardia: negative     ID:  -pan cx ; aerobic bottles growing GS+ cocci in clusters   -Febrile, leukocytosis and tachycardia with elevated lactic acid on   -vancomycin/meropenem empirically (-), d/c per ID and repeat blood cx     Dispo: regional status, full code, AR    D/w Dr. Dorado HPI: Osiel Norwood is a 67YO male with a past medical history of type 2 diabetes, hyperlipidemia, iron deficiency anemia, tracheal stenosis s/p tracheostomy (which was closed by ENT 7 days ago); glioblastoma s/p resection 2022, and Avastin treatment 3/2023 presenting with altered mental status and weakness x2 days. Per family, they last saw him last night around 6pm when he was at baseline. Patient has intermittent expressive aphasia but is getting worse and now is persistent, and has intermittent nausea and vomiting since last night. Patient was supposed to receive an outpatient brain MRI on 2023.  Stroke code performed in  is negative for CVA. Head CT shows new hyperdensity in frontal parietal - surgical site. MRI is ordered to rule out tumor recurrence.   Per daughter at bedside, Entresto and Eliquis were stopped per his PC - cardiologist.      (2023 13:10)    SUBJECT: Patient seen at bedside with family. Patient endorses slight increased anxiety overnight with PSVT 13 beats to 160s followed by sinus tachycardia. Patient was comforted with resolution to low 100s and  bolus IV NS was given for net negative fluid balance.    HOSPITAL COURSE:  : Admitted. Na 115 in ED with repeat 118, started 3% at 30cc/hr, and salt tabs 3q6, stability CTH in ED showing Interval development of a 13 mm hyperdense nodule along the   inferior margin of the resection cavity which may represent progression   of disease with hemorrhage, repeat CTH stable, urine studies ordered  : Neuro stable, 12AM BMP Na114 3% increased to 40cc/hr, urine studies, pancx to r/o infection since pt is immunocompromised. Changed pantoprazole to sucralfate for GI ppx d/t thrombocytopenia. LE dopplers negative. DC'd 3%. ENT consulted to assess stoma, recommend follow up upon discharge with ENT, Repeat sodium 122. Restarted 3% at 30.  : ANA LILIA o/n neuro stable. remains on 3%@30cc/hr. Started florinef, increased 3% to 50cc/hr. Increased 3% to 75cc/hr.    7/10: continued current 3% rate o/n. Na 127 --> 125, cont 3% @75cc/hr, f/u repeat Na this evening, likely stepdown tomorrow. Pend dispo home with home PT/OT when able. Heme consulted for thrombocytopenia  : ANA LILIA o/n. Remains on 3% @75cc/hr. Decreased 3% to 50cc/hr. Started 1.2L fluid restriction. Repeat Na corrected 128  : ANA LILIA overnight, remains on 3%, SDU from ICU  : ANA LILIA overnight, neuro stable, on 3%@50, Am sodium 137, decreased 3% to 25cc/hr, repeat Na 138, decreased to 15cc/hr.  : ANA LILIA overnight, neuro stable, remains on 3%@15cc/hr  7/15: AAN LILIA overnight. Neuro exam stable. Pt remains on 3% saline.Sodium 134 this AM remains on 3%@25. Per nephrology recs hypertonic Na d'ceed, Na tabs 2q6, URENA 15g BID, cont florinef/ fluid restriction.Per nephrology Na >130 ok when ready for d/c   : ANA LILIA overnight, hypertonics d/c now on urea powder/1L fluid restriction/florinef and salt tabs per renal, hydrocortisone cream ordered for rash on back, rec for Home PT  : ANA LILIA overnight, following Na, renal following, ENT saw for hoarse voice rec followup with CT surgery for possible dilation, pending Home PT. Patient developed sudden onset severe headache. Hyperventilating with increased work of breathing. Wheezing in all lung fields to auscultation. Neuro intact on exam. Duoneb x1 ordered. Dilaudid 0.25 IV given for pain control. Subsequently patient developed nausea with multiple episodes of vomiting. Hypertensive with SBP in the 190s. Given hydralazine 10mg IVP, Zofran. Stroke code and rapid response called. STAT labs sent. CTH/CTA/CTP completed. Tachycardic to the 180s, consistent with SVT, remained hypertensive. Given 10 of labetalol, SVT broke. Transferred to Telemetry. WBC 23.43, lactate 8.5. Given 1L fluid bolus. Pan culture sent. Started empirically on Vanc/Meropenem.   : Put on eeg. Voiding while retaining urine, SC for 500cc. EEG +spikes, epilepsy consulted. CTA chest and CT A/P pending. Keppra increased 1g BID.   : Cont EEG, trend Na, possible NGT today if not able to tolerate PO. Blood cx growing GS + cocci in clusters, f/u MRSA swab and pan cx, cont meropenem and vancomycin. Vanc trough in am. F/u urine studies.   : Off EEG, neuro exam improved. ID following for concern for sepsis, likely aerobic blood cx bottle staph epi contaminant, cont monitoring off of abx, possible drug reaction as cause. F/u surveillance blood cx. Hyponatremia, repeat Na 129 overnight from 130, cont current regimen and f/u am labs and nephrology recs. AM na 129 continuing fluid restriction, nephro recommending restarting urena, but holding due to possible drug reaction previously.   : neurologically stable, c/w fluid restriction, f/u AM Na, urine osm, urine Na, cortisol. Given 15mg of tolvaptan. Fluid restriction, florinef d/c'd per nephro recs. Held salt tabs for today. 6pm BMP Na 122, 10pm BMP Na 131, urine osm 141  : ANA LILIA ovn, neuro stable. AM Na 133. Restarted salt tabs 3q6 and 1L fluid restriction. Lantus 10u at bedtime added. 9:30pm BMP per nephro Na 138  : ANA LILIA ovn, neuro stable. Na stable, discussed with nephrology can cont tolvaptan 15mg daily PRN for hyponatremia, only needed for hypoNa, can discontinue fluid restriction. Pt evaluated at bedside after nurse noted patient to have some expressive aphasia and perseverating; patient oriented to self, unable to say hospital or year, following all commands and DUMONT 5/5 strength, no drift, no facial droop, perseverating on pt's  when asked other questions. Pt afebrile, vitals stable, cont keppra 1g BID, cont decadron 4mg BID, discussed with Dr. Dorado, defer CTH at this time and monitor clinically. . Lantus increased to 12u at bedtime.     Vital Signs Last 24 Hrs  T(C): 36.8 (2023 18:17), Max: 36.8 (2023 18:17)  T(F): 98.2 (2023 18:17), Max: 98.2 (2023 18:17)  HR: 102 (2023 00:32) (90 - 102)  BP: 119/59 (2023 00:32) (102/59 - 119/59)  BP(mean): 84 (2023 00:32) (77 - 89)  RR: 18 (:32) (16 - 18)  SpO2: 93% (:32) (93% - 98%)    Parameters below as of 2023 00:32  Patient On (Oxygen Delivery Method): room air    I&O's Summary    2023 07:01  -  2023 07:00  --------------------------------------------------------  IN: 680 mL / OUT: 1000 mL / NET: -320 mL    2023 07:01  -  2023 01:46  --------------------------------------------------------  IN: 0 mL / OUT: 850 mL / NET: -850 mL    PHYSICAL EXAM:  General: Patient laying in bed. A&Ox3 (year with choices) on Room Air.  HEENT: 4mm pupils equal and reactive to light bilaterally, Extraocular muscles intact bilaterally.  Cardiovascular: Clear S1 and S2 without murmurs, gallops or rubs.  Respiratory: Clear to ausculation bilaterally without wheezing, rhonchi or rales.  GI: Soft, nontender, nondistended. Positive bowel sounds in all four quadrants.  Neuro: CN II-XII grossly intact. Expressive aphasia with perseverating speech. Strength 4+/5 upper and lower extremities bilaterally. Opens eyes spontaneously, and follows commands.   Extremities: 2+ Dorsalis Pedis and Posterior Tibial pulses bilaterally.    LABS:                        11.1   6.48  )-----------( 121      ( 2023 05:30 )             32.8     07-23    137  |  104  |  21  ----------------------------<  225<H>  3.7   |  24  |  0.61    Ca    8.4      2023 18:44  Phos  3.1       Mg     1.8         Urinalysis Basic - ( 2023 18:44 )    Color: x / Appearance: x / SG: x / pH: x  Gluc: 225 mg/dL / Ketone: x  / Bili: x / Urobili: x   Blood: x / Protein: x / Nitrite: x   Leuk Esterase: x / RBC: x / WBC x   Sq Epi: x / Non Sq Epi: x / Bacteria: x    CAPILLARY BLOOD GLUCOSE    POCT Blood Glucose.: 156 mg/dL (2023 22:10)  POCT Blood Glucose.: 270 mg/dL (2023 16:03)  POCT Blood Glucose.: 199 mg/dL (2023 11:31)  POCT Blood Glucose.: 195 mg/dL (2023 06:19)    Drug Levels: Vancomycin Level, Trough: 5.9 ug/mL ( @ 05:30)    Allergies: Amoxicillin (Rash)    MEDICATIONS:  Neuro:  acetaminophen     Tablet .. 650 milliGRAM(s) Oral every 6 hours PRN  escitalopram 5 milliGRAM(s) Oral daily  levETIRAcetam  IVPB 1000 milliGRAM(s) IV Intermittent every 12 hours  melatonin 5 milliGRAM(s) Oral at bedtime  ondansetron Injectable 4 milliGRAM(s) IV Push every 6 hours PRN    OTHER:  atorvastatin 20 milliGRAM(s) Oral at bedtime  benzocaine/menthol Lozenge 1 Lozenge Oral every 4 hours PRN  dexAMETHasone  Injectable 4 milliGRAM(s) IV Push every 12 hours  fluticasone propionate 50 MICROgram(s)/spray Nasal Spray 1 Spray(s) Both Nostrils two times a day  hydrocortisone 1% Cream 1 Application(s) Topical daily  insulin glargine Injectable (LANTUS) 14 Unit(s) SubCutaneous at bedtime  insulin lispro (ADMELOG) corrective regimen sliding scale   SubCutaneous Before meals and at bedtime  polyethylene glycol 3350 17 Gram(s) Oral daily  senna 2 Tablet(s) Oral at bedtime  sucralfate 1 Gram(s) Oral every 12 hours    IVF:  sodium chloride 3 Gram(s) Oral every 6 hours    CULTURES:  Culture Results:   No growth at 4 days. ( @ 18:36)  Culture Results:   Growth in aerobic and anaerobic bottles: Staphylococcus epidermidis ( @ 18:48)    ASSESSMENT:   Pt is a 67YO male with a past medical history of type 2 diabetes, hyperlipidemia, iron deficiency anemia, tracheal stenosis s/p tracheostomy (which was closed by ENT 7 days ago); glioblastoma s/p resection 2022, and Avastin treatment 3/2023 presenting with expressive aphasia, nuasea and vomiting x1 day. CT shows new hyperdensity in left frontal parietal - surgical site c/f disease progression. Na in the E 118. Hospital course complicated by concern for possible sepsis, ID following now off abx, hemodynamically stable.     Plan:  Neuro:  -neuro q4/vitals q4hrs   -CTH : Interval development of a 13 mm hyperdense nodule along the   inferior margin of the resection cavity which may represent progression   of disease with hemorrhage. Repeat CTH stable, CTH  stable   -CTA : negative, CTA  stable  -CTP  stable.   -pain control prn  -Keppra increased to 1g BID 2023  -Decadron 4mg BID  -c/w home Lexapro 5mg  -home ASA 81 held i/s/o thrombocytopenia   -MRI suspicious for recurrent tumor and hydro   -EEG : Occasional to frequent sharply contoured waves over left frontotemporal region d/c   -epilepsy consulted   -palliative following     Pulm:  -satting well on RA    Cardio:  --160    GI:  - CCD (dc'd fluid restrict)  -bowel regimen  -sucralfate    Renal:  - Hyponatremic (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 as well as SSRI use), improving: Na 118 on admission -> 137 on .  - Trend BMP  - off NS for pulm edema on CXR; off hypertonics since 7/15  - salt tabs 3q6   - 1L fluid restriction   - s/p tolvaptan 15mg   - May need to administer D5W if patient overcorrects  -voiding, sc prn   -dc'd urea powder 15g BID started per Renal d/t petechial rash     Endo:  -ISS while on Decadron  -A1c 5.7  -lantus 14u at bedtime   -h/o T2DM: home Januvia held  -h/o HLD: c/w Atorvastatin 20mg    Heme:  -SCDs for DVT ppx, SQL and Aspirin 81 held for thrombocytopenia   -heme consulted for thrombocytopenia- w/u sent, likely chronic thrombocytopenia d/t chemotherapy agent in past   -Per heme transfuse 1U plt if bleeding and platelet count <50k, if febrile and platelet count <20k  -LE dopplers  negative  -CTA PE protocol for tachycardia: negative     ID:  - Afebrile  -pan cx ; aerobic bottles growing GS+ cocci in clusters   -vancomycin/meropenem empirically (-), d/c per ID and repeat blood cx     Dispo: regional status, full code, AR    D/w Dr. Dorado

## 2023-07-23 NOTE — PROGRESS NOTE ADULT - ASSESSMENT
68M PMHx type 2 diabetes, HLD, JAGDISH, tracheal stenosis s/p tracheostomy (which was closed by ENT 7 days ago); glioblastoma s/p resection 1/27/2022, and Avastin treatment 3/2023 presenting with expressive aphasia, nuasea and vomiting x1 day. CT shows new hyperdensity in left frontal parietal - surgical site c/f disease progression.  Patient was clinically stable for DC then developed acute deterioration on 7/17 due to SVT, fever, metabolic encephalopathy    #Hyper density in left frontal parietal which may represent progression of disease.   -Management per primary team   -Keppra increased to 1 g BID on 7/18/23, c/w current regimen  -Continue Decadron 4mg q12  -ISS while on decadron  -Pt's ASA 81 held i/s/o thrombocytopenia     #SIRS with end organ damage  #Staph epi blood cultures  - febrile, leukocytosis and tachycardia with elevated lactic acid on 7/17  - CT C/A/P w/o infectious source  - blood cultures likely contaminant per ID, however now with 4/4 cultures positive and known to be drawn from separate sites by primary team, f/u surveillance cultures  - if any fever or repeat positive blood culture start Vanc and reconsult ID    #Hyponatremia   Suspected to be SIADH in the setting of intracranial pathology as well as SSRI use; sNa improving, 137 on 7/23/23 BW.   - Ongoing renal recs appreciated  - florinef tapering down, salt tabs 3gm q6   - trend BMP  - would avoid Urena due to possible drug reaction    #Rash  - per primary team started after URENA, will stop.  Rash resolved  - continue to monitor    #Tachycardia  Resolved  - developed SVT to 170s during rapid response, responded to IV labetalol with return to NSR.  - treat underlying cause, likely drug reaction and fever  - trops during HTN emergency/rapid response negative    #Type II Diabetes  -A1c 5.7%  -Pt is on Januvia at home   -Continue ISS  -With 16 u ISS coverage required over the last 24 hrs, started lantus 10 u overnight so previous ISS coverage reflective of requirements prior to adding basal coverage. Still elevated FS, can consider increasing basal dose to 14 u this evening (Lantus 14 u QHS)  -Closely monitor FS    #HLD  - Continue Atorvastatin 20mg daily     #Thrombocytopenia   -Pt's platelets are stable 70-100k  -Heme consulted for thrombocytopenia; Per Heme documentation the pt w/  history of thrombocytopenia with his baseline range  since the latter half of last year and that the pt's Pt's thrombocytopenia likely due to past treatment with temozolomide.   Per heme transfuse 1U plt if bleeding and platelet count <50k, if febrile and platelet count <20k,   Dopplers 7/8/23 negative for DVT    #Dispo:   -Pt is for Home PT pending medical improvement

## 2023-07-23 NOTE — H&P ADULT - PATIENT'S GENDER IDENTITY
DIAGNOSIS:  hyperglycemia// anemia / balanitis / high blood pressure    -  in the er - he received -- lantus 25 units in the er  and metformin 1000 mg     -- the bottom is for senior life    -  he has iron studies and  hb a1 c pending    - please check his penis/ foreskin and scrotum and inguinal areA     - we sent a urine culture from the tubing not the bag- that you can check the results of     - please return to  the er for any fevers- temp > 100.4/ or any new/ worsening/concerning symptoms to you     -
Male

## 2023-07-24 NOTE — PROGRESS NOTE ADULT - SUBJECTIVE AND OBJECTIVE BOX
Patient is a 68y old  Male who presents with a chief complaint of Altered Mental Status & Weakness x2 days (23 Jul 2023 23:09)    INTERVAL EVENTS:  - tolerating diet, however, PO intake low.   - no chest pain or dyspnea,   - no nausea or vomiting,     SUBJECTIVE:  Patient was seen and examined at bedside.    Diet, Consistent Carbohydrate w/Evening Snack:   Supplement Feeding Modality:  Oral  Glucerna Shake Cans or Servings Per Day:  1       Frequency:  Three Times a day (07-23-23 @ 14:45) [Active]    MEDICATIONS:  MEDICATIONS  (STANDING):  atorvastatin 20 milliGRAM(s) Oral at bedtime  dexAMETHasone  Injectable 4 milliGRAM(s) IV Push every 12 hours  escitalopram 5 milliGRAM(s) Oral daily  fluticasone propionate 50 MICROgram(s)/spray Nasal Spray 1 Spray(s) Both Nostrils two times a day  hydrocortisone 1% Cream 1 Application(s) Topical daily  insulin glargine Injectable (LANTUS) 18 Unit(s) SubCutaneous at bedtime  insulin lispro (ADMELOG) corrective regimen sliding scale   SubCutaneous Before meals and at bedtime  levETIRAcetam  IVPB 1000 milliGRAM(s) IV Intermittent every 12 hours  melatonin 5 milliGRAM(s) Oral at bedtime  sodium chloride 3 Gram(s) Oral every 6 hours  sucralfate 1 Gram(s) Oral every 12 hours    MEDICATIONS  (PRN):  acetaminophen     Tablet .. 650 milliGRAM(s) Oral every 6 hours PRN Temp greater or equal to 38C (100.4F), Mild Pain (1 - 3)  benzocaine/menthol Lozenge 1 Lozenge Oral every 4 hours PRN Sore Throat  ondansetron Injectable 4 milliGRAM(s) IV Push every 6 hours PRN Nausea and/or Vomiting    Allergies    amoxicillin (Rash)    Intolerances    OBJECTIVE:  Vital Signs Last 24 Hrs  T(C): 36.2 (24 Jul 2023 18:17), Max: 36.6 (24 Jul 2023 09:16)  T(F): 97.2 (24 Jul 2023 18:17), Max: 97.8 (24 Jul 2023 09:16)  HR: 98 (24 Jul 2023 12:15) (90 - 116)  BP: 118/64 (24 Jul 2023 12:15) (95/63 - 133/75)  BP(mean): 86 (24 Jul 2023 12:15) (75 - 98)  RR: 18 (24 Jul 2023 12:13) (18 - 18)  SpO2: 96% (24 Jul 2023 12:15) (93% - 98%)    Parameters below as of 24 Jul 2023 12:13  Patient On (Oxygen Delivery Method): room air      I&O's Summary    23 Jul 2023 07:01  -  24 Jul 2023 07:00  --------------------------------------------------------  IN: 500 mL / OUT: 1150 mL / NET: -650 mL    PHYSICAL EXAM:  Gen: Reclining in bed at time of exam, appears stated age  HEENT:  MMM, clear OP  Neck: supple, trachea at midline  CV: RRR, +S1/S2  Pulm: adequate respiratory effort, no increase in work of breathing  Abd: soft, ND  Skin: warm and dry,   Ext: no LE Edema   Neuro: Alert, thinks the year is "2022", does not answer when asked place or situation   Psych: affect and behavior appropriate,  affect subdued.     LABS:                        11.0   4.27  )-----------( 110      ( 24 Jul 2023 05:30 )             33.5     07-24    138  |  103  |  21  ----------------------------<  209<H>  3.9   |  26  |  0.64    Ca    7.8<L>      24 Jul 2023 05:30  Phos  2.9     07-24  Mg     1.8     07-24      CAPILLARY BLOOD GLUCOSE      POCT Blood Glucose.: 241 mg/dL (24 Jul 2023 17:16)  POCT Blood Glucose.: 205 mg/dL (24 Jul 2023 11:45)  POCT Blood Glucose.: 184 mg/dL (24 Jul 2023 06:33)  POCT Blood Glucose.: 156 mg/dL (23 Jul 2023 22:10)    Urinalysis Basic - ( 24 Jul 2023 05:30 )    Color: x / Appearance: x / SG: x / pH: x  Gluc: 209 mg/dL / Ketone: x  / Bili: x / Urobili: x   Blood: x / Protein: x / Nitrite: x   Leuk Esterase: x / RBC: x / WBC x   Sq Epi: x / Non Sq Epi: x / Bacteria: x        MICRODATA:      RADIOLOGY/OTHER STUDIES:

## 2023-07-24 NOTE — PROGRESS NOTE ADULT - ASSESSMENT
68M PMHx type 2 diabetes, HLD, JAGDISH, tracheal stenosis s/p tracheostomy (which was closed by ENT 7 days ago); glioblastoma s/p resection 1/27/2022, and Avastin treatment 3/2023 presenting with expressive aphasia, nuasea and vomiting x1 day. CT shows new hyperdensity in left frontal parietal - surgical site c/f disease progression.  Patient was clinically stable for DC then developed acute deterioration on 7/17 due to SVT, fever, metabolic encephalopathy    #Hyperdensity in left frontal parietal which may represent progression of disease.   -Management per primary team   -Keppra increased to 1 g BID on 7/18/23, defer regimen to primary team   -Continue Decadron 4mg q12  -ISS + glargine while on decadron  -Pt's ASA 81 held i/s/o thrombocytopenia     #SIRS with end organ damage  #Staph epi blood cultures  - febrile, leukocytosis and tachycardia with elevated lactic acid on 7/17  - CT C/A/P w/o infectious source  - blood cultures likely contaminant per ID, surveillance cultures from 7/19 NGTD   - if any fever or repeat positive blood culture start Vanc and reconsult ID    #Hyponatremia   Suspected to be SIADH in the setting of intracranial pathology as well as SSRI use; sNa improving, 137 on 7/23/23 BW.   - Ongoing renal recs appreciated  - florinef stopped; on salt tabs 3gm q6   - trend BMP  - would avoid Urena due to possible drug reaction    #Rash  - per primary team started after URENA, will stop.  Rash resolved  - continue to monitor    #Tachycardia  Resolved  - developed SVT to 170s during rapid response, responded to IV labetalol with return to NSR.  - treat underlying cause, likely drug reaction and fever  - trops during HTN emergency/rapid response negative    #Type II Diabetes  -A1c 5.7%  -Pt is on Januvia at home   -Continue ISS  -With 12 u ISS + over the last 24 hrs, in addition to lantus 14  ----> total insulin req 26 units ; AM BG on BMP today ~200   - Recommend increasing lantus from 14 units to 18 units qHS   -Closely monitor FS    #HLD  - Continue Atorvastatin 20mg daily     #Thrombocytopenia   -Pt's platelets are stable 70-100k  -Heme consulted for thrombocytopenia; Per Heme documentation the pt w/  history of thrombocytopenia with his baseline range  since the latter half of last year and that the pt's Pt's thrombocytopenia likely due to past treatment with temozolomide.   Per heme transfuse 1U plt if bleeding and platelet count <50k, if febrile and platelet count <20k,   Dopplers 7/8/23 negative for DVT    #Dispo:   -possibly to rehab      68M PMHx type 2 diabetes, HLD, JAGDISH, tracheal stenosis s/p tracheostomy (which was closed by ENT 7 days ago); glioblastoma s/p resection 1/27/2022, and Avastin treatment 3/2023 presenting with expressive aphasia, nuasea and vomiting x1 day. CT shows new hyperdensity in left frontal parietal - surgical site c/f disease progression.  Patient was clinically stable for DC then developed acute deterioration on 7/17 due to SVT, fever, metabolic encephalopathy    #Hyperdensity in left frontal parietal which may represent progression of disease.   -Management per primary team   -Keppra increased to 1 g BID on 7/18/23, defer regimen to primary team   -Continue Decadron 4mg q12  -ISS + glargine while on decadron  -Pt's ASA 81 held i/s/o thrombocytopenia     #SIRS with end organ damage  #Staph epi blood cultures  - febrile, leukocytosis and tachycardia with elevated lactic acid on 7/17  - CT C/A/P w/o infectious source  - blood cultures likely contaminant per ID, surveillance cultures from 7/19 NGTD   - if any fever or repeat positive blood culture start Vanc and reconsult ID    #Hyponatremia   Suspected to be SIADH in the setting of intracranial pathology as well as SSRI use; sNa improving, 137 on 7/23/23 BW.   - Ongoing renal recs appreciated  - florinef stopped; on salt tabs 3gm q6   - trend BMP  - would avoid Urena due to possible drug reaction  [ ] recommend adding on free thyroxine to complete siadh workup     # likely Sick euthyroid syndrome  Mild decrease in Free Thyroxine, TSH wnl. possible negative feedback from high dose steroids + stress of acute illness.   Recommend repeating tfts 2 weeks after discharge.     #Rash  - per primary team started after URENA, will stop.  Rash resolved  - continue to monitor    #Tachycardia  Resolved  - developed SVT to 170s during rapid response, responded to IV labetalol with return to NSR.  - treat underlying cause, likely drug reaction and fever  - trops during HTN emergency/rapid response negative    #Type II Diabetes  -A1c 5.7%  -Pt is on Januvia at home   -Continue ISS  -With 12 u ISS + over the last 24 hrs, in addition to lantus 14  ----> total insulin req 26 units ; AM BG on BMP today ~200   - Recommend increasing lantus from 14 units to 18 units qHS   -Closely monitor FS    #HLD  - Continue Atorvastatin 20mg daily     #Thrombocytopenia   -Pt's platelets are stable 70-100k  -Heme consulted for thrombocytopenia; Per Heme documentation the pt w/  history of thrombocytopenia with his baseline range  since the latter half of last year and that the pt's Pt's thrombocytopenia likely due to past treatment with temozolomide.   Per heme transfuse 1U plt if bleeding and platelet count <50k, if febrile and platelet count <20k,   Dopplers 7/8/23 negative for DVT    #Dispo:   -possibly to rehab

## 2023-07-24 NOTE — CHART NOTE - NSCHARTNOTEFT_GEN_A_CORE
7/24: o/n PSVT 13 beats followed by sinus tachycardia in setting of anxiety and net negative fluid balance.  Resolved to 102 with verbal comfort and further resolved with 500 cc NS. Lantus increased to 18u qhs. Dr. Costa consulted.

## 2023-07-25 NOTE — PROGRESS NOTE ADULT - ASSESSMENT
68M PMHx type 2 diabetes, HLD, JAGDISH, tracheal stenosis s/p tracheostomy (which was closed by ENT 7 days ago); glioblastoma s/p resection 1/27/2022, and Avastin treatment 3/2023 presenting with expressive aphasia, nuasea and vomiting x1 day. CT shows new hyperdensity in left frontal parietal - surgical site c/f disease progression.  Patient was clinically stable for DC then developed acute deterioration on 7/17 due to SVT, fever, metabolic encephalopathy    #Hyperdensity in left frontal parietal which may represent progression of disease.   -Management per primary team   -Keppra increased to 1 g BID on 7/18/23, defer regimen to primary team   -Continue Decadron 4mg q12  -ISS + glargine while on decadron  -Pt's ASA 81 held i/s/o thrombocytopenia     #SIRS with end organ damage, resolved  #Staph epi blood cultures  - febrile, leukocytosis and tachycardia with elevated lactic acid on 7/17  - CT C/A/P reviewed, w/o infectious source  - blood cultures likely contaminant per ID, surveillance cultures from 7/19 NGTD   - if any fever or repeat positive blood culture start Vanc and reconsult ID    #Hyponatremia   Suspected to be SIADH in the setting of intracranial pathology as well as SSRI use; sNa improving,   - Ongoing renal recs appreciated  - florinef stopped; on salt tabs 3gm q6   - trend BMP  - would avoid Urena due to possible drug reaction  - free t4 WNL    # likely Sick euthyroid syndrome  Mild decrease in Free Thyroxine, TSH wnl. possible negative feedback from high dose steroids + stress of acute illness.   Recommend repeating tfts 2 weeks after discharge.     #Rash  - per primary team started after URENA, now discontinued.  Rash resolved  - continue to monitor    #Tachycardia  Resolved    #Type II Diabetes  -A1c 5.7%  -Pt is on Januvia at home   -Continue ISS  - AM POCT 90, reduce lantus to 16 units QHS, and monitor ISS.    #HLD  - Continue Atorvastatin 20mg daily     #Thrombocytopenia   -Pt's platelets are stable 70-100k; labs reviewed today.  -Heme consulted for thrombocytopenia; Per Heme documentation the pt w/  history of thrombocytopenia with his baseline range  since the latter half of last year and that the pt's Pt's thrombocytopenia likely due to past treatment with temozolomide.   Per heme transfuse 1U plt if bleeding and platelet count <50k, if febrile and platelet count <20k,   Dopplers 7/8/23 negative for DVT    #Dispo:   -[facility], acceptances pending.

## 2023-07-25 NOTE — PROGRESS NOTE ADULT - NSPROGADDITIONALINFOA_GEN_ALL_CORE
I have personally reviewed the above clinical note and all of its components and:  [x] agree with the above assessment and plan without modifications.  [ ] agree with the above with modifications made to the assessment and/or plan of care.  [ ] disagree with the above with significant modifications as listed below:

## 2023-07-25 NOTE — SPEECH LANGUAGE PATHOLOGY EVALUATION - SLP DIAGNOSIS
Per the [facility] bedside, Pt presents with moderate deficits based on the bedside language score. However, pt was disengaged and with reduced attention to task. Suspect cognitive linguistic deficits further impacted performance. 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 Expressively, pt presents with word finding difficulty in object naming and descriptions. Pt benefited from phonemic cues for word retrieval. Repetition is relatively intact at phrase level. Pt able to communicate basic wants/needs. Receptively, Pt relatively intact with yes/no questions, however, had difficulty with sequential commands. No tameka dysarthria noted in conversation. Presentation consistent with left hemispheric involvement. Improvement in communication is fair given recurrence of GBM and s/p multiple lines of treatment. Given overall presentation, do not recommend intervention at this time. Pt is able to communicate basic wants/needs. He was disengaged and requested to rest. Per the [facility] bedside, Pt presents with moderate deficits based on the bedside language score. However, pt was disengaged and with reduced attention to task. Suspect cognitive linguistic deficits further impacted performance. 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 Expressively, pt presents with word finding difficulty in object naming and descriptions. Pt benefited from phonemic cues for word retrieval. Repetition is relatively intact at phrase level. Pt able to communicate basic wants/needs. Receptively, Pt relatively intact with yes/no questions, however, had difficulty with sequential commands. No tameka dysarthria noted in conversation. Presentation consistent with left hemispheric involvement. Improvement in communication is poor given recurrence of GBM and s/p multiple lines of treatment. Given overall presentation, do not recommend intervention at this time. Pt is able to communicate basic wants/needs. He was disengaged and requested to rest.

## 2023-07-25 NOTE — CONSULT NOTE ADULT - ASSESSMENT
Neurosurgery    68 y o male with a past medical history of type 2 diabetes, hyperlipidemia, iron deficiency anemia, tracheal stenosis s/p tracheostomy (which was closed by ENT 7 days ago); glioblastoma s/p resection 1/27/2022, and Avastin treatment 3/2023 presenting with expressive aphasia, nausea and vomiting x1 day. CT shows new hyperdensity in left frontal parietal - surgical site c/f disease progression. Also hyponatremic to 118. Admitted for Na management. Hospital course complicated by concern for possible sepsis, ID following now off abx, hemodynamically stable.     Plan:  Neuro:  -neuro/vitals q4hrs   -CTH 7/7: Interval development of a 13 mm hyperdense nodule along the inferior margin of the resection cavity which may represent progression of disease with hemorrhage. Repeat CTH stable, CTH 7/17 stable   -CTA 7/7: negative, CTA 7/17 stable  -CTP 7/17 stable.   -pain control prn  -Keppra increased to 1g BID 7/18/2023  -Decadron 4mg BID  -c/w home Lexapro 5mg  -home ASA 81 held i/s/o thrombocytopenia   -MRI suspicious for recurrent tumor and hydro   -EEG 7/17: Occasional to frequent sharply contoured waves over left frontotemporal region d/c 7/19  -epilepsy consulted   -palliative following     Pulm:  -satting well on RA    Cardio:  --160    GI:  -CCD (dc'd fluid restrict)  -bowel regimen held for loose stools  -sucralfate for ulcer ppx while on steroids  -last BM 7/24    Renal:  - Hyponatremic (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 as well as SSRI use), improving: Na 118 on admission -> 138 on 7/24.  - Trend BMP  - off NS for pulm edema on CXR; off hypertonics since 7/15  - salt tabs 3q6   - s/p tolvaptan 15mg 7/21  - May need to administer D5W if patient overcorrects  - voiding, sc prn   - dc'd urea powder 15g BID started per Renal d/t petechial rash     Endo:  -ISS while on Decadron  -A1c 5.7  -lantus 18u at bedtime   -h/o T2DM: home Januvia held  -h/o HLD: c/w Atorvastatin 20mg    Heme:  -SCDs for DVT ppx, SQL and Aspirin 81 held for thrombocytopenia   -heme consulted for thrombocytopenia- w/u sent, likely chronic thrombocytopenia d/t chemotherapy agent in past   -Per heme transfuse 1U plt if bleeding and platelet count <50k, if febrile and platelet count <20k  -LE dopplers 7/8 negative  -CTA PE protocol for tachycardia: negative     ID:  -pan cx 7/7; aerobic bottles growing GS+ cocci in clusters   -Febrile, leukocytosis and tachycardia with elevated lactic acid on 7/17  -vancomycin/meropenem empirically (7/17-19), d/c per ID and repeat blood cx 7/19    Dispo: regional status, full code, [facility]

## 2023-07-25 NOTE — CHART NOTE - NSCHARTNOTEFT_GEN_A_CORE
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      Type 2 diabetes mellitus      Hyperlipidemia      Iron deficiency anemia      Nephrolithiasis      Thrombocytopenia      Hyponatremia      BPH (benign prostatic hyperplasia)      S/P craniotomy      H/O tracheostomy      Current Nutrition Order:  > Conscho   > Glucerna TID (220Kcal, 10g protein each)    PO Intake: Good (%) [   ]  Fair (50-75%) [ X ] Poor (<25%) [   ]    GI Issues: no nvdc; passing flatus; BM x3 this am   - off bowel regimen     Pain: no pain/discomfort noted     Skin Integrity:  bruised; with wound to midline  No edema noted; remains free of PUs  diana scale 17    Labs:   07-25    139  |  104  |  17  ----------------------------<  122<H>  3.6   |  24  |  0.63    Ca    7.7<L>      25 Jul 2023 05:30  Phos  2.8     07-25  Mg     1.8     07-25      CAPILLARY BLOOD GLUCOSE      POCT Blood Glucose.: 220 mg/dL (25 Jul 2023 12:07)  POCT Blood Glucose.: 90 mg/dL (25 Jul 2023 07:21)  POCT Blood Glucose.: 100 mg/dL (25 Jul 2023 06:50)  POCT Blood Glucose.: 208 mg/dL (24 Jul 2023 21:53)  POCT Blood Glucose.: 241 mg/dL (24 Jul 2023 17:16)      Medications:  MEDICATIONS  (STANDING):  atorvastatin 20 milliGRAM(s) Oral at bedtime  dexAMETHasone  Injectable 4 milliGRAM(s) IV Push every 12 hours  escitalopram 5 milliGRAM(s) Oral daily  fluticasone propionate 50 MICROgram(s)/spray Nasal Spray 1 Spray(s) Both Nostrils two times a day  hydrocortisone 1% Cream 1 Application(s) Topical daily  insulin glargine Injectable (LANTUS) 18 Unit(s) SubCutaneous at bedtime  insulin lispro (ADMELOG) corrective regimen sliding scale   SubCutaneous Before meals and at bedtime  levETIRAcetam  IVPB 1000 milliGRAM(s) IV Intermittent every 12 hours  melatonin 5 milliGRAM(s) Oral at bedtime  sodium chloride 3 Gram(s) Oral every 6 hours  sucralfate 1 Gram(s) Oral every 12 hours    MEDICATIONS  (PRN):  acetaminophen     Tablet .. 650 milliGRAM(s) Oral every 6 hours PRN Temp greater or equal to 38C (100.4F), Mild Pain (1 - 3)  benzocaine/menthol Lozenge 1 Lozenge Oral every 4 hours PRN Sore Throat  ondansetron Injectable 4 milliGRAM(s) IV Push every 6 hours PRN Nausea and/or Vomiting      Weight:  Daily     Daily     Weight Change: no new wts to review at this time; last wt taken on admit 7/7    Estimated energy needs:   Weight used for calculations	current weight  Estimated Energy Needs Weight (lbs)	160 lb  Estimated Energy Needs Weight (kg)	72.5 kg    Estimated Energy Needs From (ji/kg) 30  Estimated Energy Needs To (ji/kg)	35  Estimated Energy Needs Calculated From (ji/kg) 2175  Estimated Energy Needs Calculated To (ji/kg)	2537    Estimated Protein Needs From (g/kg)	1.2  Estimated Protein Needs To (g/kg)	1.5  Estimated Protein Needs Calculated From (g/kg) 87  Estimated Protein Needs Calculated To (g/kg)	108.75    Other Calculations	Based on Standards of Care pt within % IBW (108%) thus actual body weight used for all calculations (160#). Needs adjusted for advanced age and malnutrition. Fluid recs per team in view of hyponatremia.    Subjective: 69YO male with a past medical history of type 2 diabetes, hyperlipidemia, iron deficiency anemia, tracheal stenosis s/p tracheostomy (which was closed by ENT 7 days ago); glioblastoma s/p resection 1/27/2022, and Avastin treatment 3/2023 presenting with altered mental status and weakness x2 days. Per family, they last saw him last night around 6pm when he was at baseline. Patient has intermittent expressive aphasia but is getting worse and now is persistent, and has intermittent nausea and vomiting since last night. 7/16: 1L fluid restriction. 7/17 Stroke code; NPO status until dysphagia screening.    Visited pt at bedside this am on 8LA. RN in the room. On assessment pt is asleep but arouses to voice. Per RN report, pt had fruit cup and 100% Glucerna this am. RN reports he refused to eat eggs and sausage because he "doesn't like them". On last RD assessment pt was tolerating 50% meal tray. Current diet order remains appropriate. Recommend monitor po intake/tolerance closely and provide max encouragement at all meals. Nutrition related labs reviewed; FSBG 220, 90, 100, 208, 241- remains on insulin regimen. No further nutrition-related concerns at this time. Please view full recs below. RD to remain available.      Previous Nutrition Diagnosis: Malnutrition (moderate) related to chronic disease as evidenced by <75% nutrition needs >1 month, mild muscle wasting     Active [ X ]  Resolved [   ]    Goal:  - Consistently meets > 75% EER   - does not show further s/s of malnutrition     Recommendations:  1. Continue with current diet order (conscho)  2. Continue with Glucerna TID (220Kcal, 10g protein each)  3. Monitor po intake/tolerance closely; provide max encouragement at all meals  4. [place] pt food preferences as able   5. Monitor GI fxn, skin integrity, labs, wts   > adjust insulin regimen to reach euglycemia; FSBG q4-6hrs   > obtain new wt to monitor trends and better assess nutrition adequacy   6. RD to remain available for recs adjustment prn or will follow up pt per organizational policy     Education: deferred     Risk Level: High [ X ] Moderate [   ] Low [   ]

## 2023-07-25 NOTE — SPEECH LANGUAGE PATHOLOGY EVALUATION - SLP GENERAL OBSERVATIONS
Pt was seen fully awake and alert, HOB fully elevated, on room air. Pt oriented to self and place only. He was disoriented to date. Pt followed simple directives and communicated basic wants/needs. He was disengaged and required prompting to attend to task.

## 2023-07-25 NOTE — PROGRESS NOTE ADULT - SUBJECTIVE AND OBJECTIVE BOX
INTERVAL EVENTS:      SUBJECTIVE:      PHYSICAL EXAM:  Gen: Reclining in bed at time of exam, appears stated age  HEENT:  MMM, clear OP  Neck: supple, trachea at midline  CV: RRR, +S1/S2  Pulm: adequate respiratory effort, no increase in work of breathing  Abd: soft, ND  Skin: warm and dry,   Ext: no LE Edema   Neuro: Alert, thinks the year is "2022", does not answer when asked place or situation   Psych: affect and behavior appropriate,  affect subdued.          INTERVAL EVENTS:      SUBJECTIVE: no complaints; AM FS 90      PHYSICAL EXAM:  Gen: NAD  Neck: supple, trachea at midline  CV: RRR, +S1/S2  Pulm: adequate respiratory effort, no increase in work of breathing  Abd: soft, ND  Skin: warm and dry,   Ext: no LE Edema   Neuro motor strength intact grossly; alert and oriented to place and person

## 2023-07-25 NOTE — SPEECH LANGUAGE PATHOLOGY EVALUATION - SLP PERTINENT HISTORY OF CURRENT PROBLEM
PMHx of type 2 diabetes, hyperlipidemia, iron deficiency anemia, tracheal stenosis s/p tracheostomy (which was closed by ENT 7 days ago); glioblastoma s/p resection 1/27/2022, and Avastin treatment 3/2023 who presented to [facility] on 7/7/23 with expressive aphasia and vomiting x 1 day. CT shows new hyperdensity in left frontal parietal - surgical site c/f disease progression.

## 2023-07-25 NOTE — PROGRESS NOTE ADULT - SUBJECTIVE AND OBJECTIVE BOX
HPI: Osiel Norwood is a 69YO male with a past medical history of type 2 diabetes, hyperlipidemia, iron deficiency anemia, tracheal stenosis s/p tracheostomy (which was closed by ENT 7 days ago); glioblastoma s/p resection 2022, and Avastin treatment 3/2023 presenting with altered mental status and weakness x2 days. Per family, they last saw him last night around 6pm when he was at baseline. Patient has intermittent expressive aphasia but is getting worse and now is persistent, and has intermittent nausea and vomiting since last night. Patient was supposed to receive an outpatient brain MRI on 2023.  Stroke code performed in  is negative for CVA. Head CT shows new hyperdensity in frontal parietal - surgical site. MRI is ordered to rule out tumor recurrence.   Per daughter at bedside, Entresto and Eliquis were stopped per his PC - cardiologist.   (2023 13:10)    SUBJECTIVE: Patient was seen at bedside. Patient laying in bed comfortably without any acute complaints.    HOSPITAL COURSE:   : Admitted. Na 115 in ED with repeat 118, started 3% at 30cc/hr, and salt tabs 3q6, stability CTH in ED showing Interval development of a 13 mm hyperdense nodule along the   inferior margin of the resection cavity which may represent progression   of disease with hemorrhage, repeat CTH stable, urine studies ordered  : Neuro stable, 12AM BMP Na114 3% increased to 40cc/hr, urine studies, pancx to r/o infection since pt is immunocompromised. Changed pantoprazole to sucralfate for GI ppx d/t thrombocytopenia. LE dopplers negative. DC'd 3%. ENT consulted to assess stoma, recommend follow up upon discharge with ENT, Repeat sodium 122. Restarted 3% at 30.  : ANA LILIA o/n neuro stable. remains on 3%@30cc/hr. Started florinef, increased 3% to 50cc/hr. Increased 3% to 75cc/hr.    7/10: continued current 3% rate o/n. Na 127 --> 125, cont 3% @75cc/hr, f/u repeat Na this evening, likely stepdown tomorrow. Pend dispo home with home PT/OT when able. Heme consulted for thrombocytopenia  : ANA LILIA o/n. Remains on 3% @75cc/hr. Decreased 3% to 50cc/hr. Started 1.2L fluid restriction. Repeat Na corrected 128  : ANA LILIA overnight, remains on 3%, SDU from ICU  : ANA LILIA overnight, neuro stable, on 3%@50, Am sodium 137, decreased 3% to 25cc/hr, repeat Na 138, decreased to 15cc/hr.  : ANA LILIA overnight, neuro stable, remains on 3%@15cc/hr  7/15: ANA LILIA overnight. Neuro exam stable. Pt remains on 3% saline.Sodium 134 this AM remains on 3%@25. Per nephrology recs hypertonic Na d'ceed, Na tabs 2q6, URENA 15g BID, cont florinef/ fluid restriction.Per nephrology Na >130 ok when ready for d/c   : ANA LILIA overnight, hypertonics d/c now on urea powder/1L fluid restriction/florinef and salt tabs per renal, hydrocortisone cream ordered for rash on back, rec for Home PT  : ANA LILIA overnight, following Na, renal following, ENT saw for hoarse voice rec followup with CT surgery for possible dilation, pending Home PT. Patient developed sudden onset severe headache. Hyperventilating with increased work of breathing. Wheezing in all lung fields to auscultation. Neuro intact on exam. Duoneb x1 ordered. Dilaudid 0.25 IV given for pain control. Subsequently patient developed nausea with multiple episodes of vomiting. Hypertensive with SBP in the 190s. Given hydralazine 10mg IVP, Zofran. Stroke code and rapid response called. STAT labs sent. CTH/CTA/CTP completed. Tachycardic to the 180s, consistent with SVT, remained hypertensive. Given 10 of labetalol, SVT broke. Transferred to Telemetry. WBC 23.43, lactate 8.5. Given 1L fluid bolus. Pan culture sent. Started empirically on Vanc/Meropenem.   : Put on eeg. Voiding while retaining urine, SC for 500cc. EEG +spikes, epilepsy consulted. CTA chest and CT A/P pending. Keppra increased 1g BID.   : Cont EEG, trend Na, possible NGT today if not able to tolerate PO. Blood cx growing GS + cocci in clusters, f/u MRSA swab and pan cx, cont meropenem and vancomycin. Vanc trough in am. F/u urine studies.   : Off EEG, neuro exam improved. ID following for concern for sepsis, likely aerobic blood cx bottle staph epi contaminant, cont monitoring off of abx, possible drug reaction as cause. F/u surveillance blood cx. Hyponatremia, repeat Na 129 overnight from 130, cont current regimen and f/u am labs and nephrology recs. AM na 129 continuing fluid restriction, nephro recommending restarting urena, but holding due to possible drug reaction previously.   : neurologically stable, c/w fluid restriction, f/u AM Na, urine osm, urine Na, cortisol. Given 15mg of tolvaptan. Fluid restriction, florinef d/c'd per nephro recs. Held salt tabs for today. 6pm BMP Na 122, 10pm BMP Na 131, urine osm 141  : ANA LILIA ovn, neuro stable. AM Na 133. Restarted salt tabs 3q6 and 1L fluid restriction. Lantus 10u at bedtime added. 9:30pm BMP per nephro Na 138  : ANA LILIA ovn, neuro stable. Na stable, discussed with nephrology can cont tolvaptan 15mg daily PRN for hyponatremia, only needed for hypoNa, can discontinue fluid restriction. Pt evaluated at bedside after nurse noted patient to have some expressive aphasia and perseverating; patient oriented to self, unable to say hospital or year, following all commands and DUMONT 5/5 strength, no drift, no facial droop, perseverating on pt's  when asked other questions. Pt afebrile, vitals stable, cont keppra 1g BID, cont decadron 4mg BID, discussed with Dr. Dorado, defer CTH at this time and monitor clinically. . Lantus increased to 14u at bedtime.  : o/n PSVT 13 beats followed by sinus tachycardia in setting of anxiety and net negative fluid balance.  Resolved to 102 with verbal comfort and further resolved with 500 cc NS. Lantus increased to 18u qhs. Dr. Costa consulted.  : ANA LILIA overnight. Neuro stable.     Vital Signs Last 24 Hrs  T(C): 36.9 (2023 22:21), Max: 36.9 (2023 22:21)  T(F): 98.4 (2023 22:21), Max: 98.4 (2023 22:21)  HR: 104 (2023 20:45) (90 - 116)  BP: 133/58 (2023 20:45) (95/63 - 141/71)  BP(mean): 84 (2023 20:45) (75 - 98)  RR: 18 (2023 20:45) (18 - 18)  SpO2: 99% (2023 20:45) (93% - 99%)    Parameters below as of 2023 20:45  Patient On (Oxygen Delivery Method): room air    I&O's Summary    2023 07:01  -  2023 07:00  --------------------------------------------------------  IN: 500 mL / OUT: 1150 mL / NET: -650 mL    2023 07:01  -  2023 00:11  --------------------------------------------------------  IN: 1300 mL / OUT: 0 mL / NET: 1300 mL    PHYSICAL EXAM:  General: Patient laying in bed. A&Ox2 (places with choices) on Room Air.   HEENT: 4mm pupils equal and reactive to light bilaterally, Extraocular muscles intact bilaterally.  Cardiovascular: Clear S1 and S2 without murmurs, gallops or rubs auscultated.  Respiratory: Clear to ausculation bilaterally without wheezing, rhonchi or rales.  GI: Soft, nontender, nondistended. Positive bowel sounds in all four quadrants.  Neuro: CN II - XII grossly intact. Expressive aphasia. Strength 4+/5 upper and lower extremities bilaterally. Opens eyes spontaneously.  Extremities: 2+ Dorsalis Pedis and Posterior Tibial pulses bilaterally.    LABS:                        11.0   4.27  )-----------( 110      ( 2023 05:30 )             33.5     07-24    138  |  103  |  21  ----------------------------<  209<H>  3.9   |  26  |  0.64    Ca    7.8<L>      2023 05:30  Phos  2.9     07-24  Mg     1.8     07-24    Urinalysis Basic - ( 2023 05:30 )    Color: x / Appearance: x / SG: x / pH: x  Gluc: 209 mg/dL / Ketone: x  / Bili: x / Urobili: x   Blood: x / Protein: x / Nitrite: x   Leuk Esterase: x / RBC: x / WBC x   Sq Epi: x / Non Sq Epi: x / Bacteria: x    CAPILLARY BLOOD GLUCOSE  POCT Blood Glucose.: 208 mg/dL (2023 21:53)  POCT Blood Glucose.: 241 mg/dL (2023 17:16)  POCT Blood Glucose.: 205 mg/dL (2023 11:45)  POCT Blood Glucose.: 184 mg/dL (2023 06:33)    Drug Levels: Vancomycin Level, Trough: 5.9 ug/mL ( @ 05:30)    Allergies: Amoxicillin (Rash)    MEDICATIONS:  Neuro:  acetaminophen     Tablet .. 650 milliGRAM(s) Oral every 6 hours PRN  escitalopram 5 milliGRAM(s) Oral daily  levETIRAcetam  IVPB 1000 milliGRAM(s) IV Intermittent every 12 hours  melatonin 5 milliGRAM(s) Oral at bedtime  ondansetron Injectable 4 milliGRAM(s) IV Push every 6 hours PRN    OTHER:  atorvastatin 20 milliGRAM(s) Oral at bedtime  benzocaine/menthol Lozenge 1 Lozenge Oral every 4 hours PRN  dexAMETHasone  Injectable 4 milliGRAM(s) IV Push every 12 hours  fluticasone propionate 50 MICROgram(s)/spray Nasal Spray 1 Spray(s) Both Nostrils two times a day  hydrocortisone 1% Cream 1 Application(s) Topical daily  insulin glargine Injectable (LANTUS) 18 Unit(s) SubCutaneous at bedtime  insulin lispro (ADMELOG) corrective regimen sliding scale   SubCutaneous Before meals and at bedtime  sucralfate 1 Gram(s) Oral every 12 hours    IVF:  sodium chloride 3 Gram(s) Oral every 6 hours    Culture Results: No growth at 5 days. ( @ 18:36), Growth in aerobic and anaerobic bottles: Staphylococcus epidermidis ( @ 18:48)    ASSESSMENT:  Pt is a 69YO male with a past medical history of type 2 diabetes, hyperlipidemia, iron deficiency anemia, tracheal stenosis s/p tracheostomy (which was closed by ENT 7 days ago); glioblastoma s/p resection 2022, and Avastin treatment 3/2023 presenting with expressive aphasia, nuasea and vomiting x1 day. CT shows new hyperdensity in left frontal parietal - surgical site c/f disease progression. Also hyponatremic to 118. Admitted for Na management. Hospital course complicated by concern for possible sepsis, ID following now off abx, hemodynamically stable.     Plan:  Neuro:  -neuro/vitals q4hrs   -CTH : Interval development of a 13 mm hyperdense nodule along the inferior margin of the resection cavity which may represent progression of disease with hemorrhage. Repeat CTH stable, CTH  stable   -CTA : negative, CTA  stable  -CTP  stable.   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  - Trend BMP  - off NS for pulm edema on CXR; off hypertonics since 7/15  - salt tabs 3q6   - s/p tolvaptan 15mg   - May need to administer D5W if patient overcorrects  - voiding, sc prn   - dc'd urea powder 15g BID started per Renal d/t petechial rash     Endo:  -ISS while on Decadron  -A1c 5.7  -lantus 18u at bedtime   -h/o T2DM: home Januvia held  -h/o HLD: c/w Atorvastatin 20mg    Heme:  -SCDs for DVT ppx, SQL and Aspirin 81 held for thrombocytopenia   -heme consulted for thrombocytopenia- w/u sent, likely chronic thrombocytopenia d/t chemotherapy agent in past   -Per heme transfuse 1U plt if bleeding and platelet count <50k, if febrile and platelet count <20k  -LE dopplers  negative  -CTA PE protocol for tachycardia: negative     ID:  -pan cx ; aerobic bottles growing GS+ cocci in clusters   -Febrile, leukocytosis and tachycardia with elevated lactic acid on   -vancomycin/meropenem empirically (-), d/c per ID and repeat blood cx     Dispo: regional status, full code, AR    D/w Dr. Dorado HPI: Osiel Norwood is a 67YO male with a past medical history of type 2 diabetes, hyperlipidemia, iron deficiency anemia, tracheal stenosis s/p tracheostomy (which was closed by ENT 7 days ago); glioblastoma s/p resection 2022, and Avastin treatment 3/2023 presenting with altered mental status and weakness x2 days. Per family, they last saw him last night around 6pm when he was at baseline. Patient has intermittent expressive aphasia but is getting worse and now is persistent, and has intermittent nausea and vomiting since last night. Patient was supposed to receive an outpatient brain MRI on 2023.  Stroke code performed in  is negative for CVA. Head CT shows new hyperdensity in frontal parietal - surgical site. MRI is ordered to rule out tumor recurrence.   Per daughter at bedside, Entresto and Eliquis were stopped per his PC - cardiologist.   (2023 13:10)    SUBJECTIVE: Patient was seen at bedside. Patient laying in bed comfortably without any acute complaints. ANA LILIA overnight. Neuro stable.     HOSPITAL COURSE:   : Admitted. Na 115 in ED with repeat 118, started 3% at 30cc/hr, and salt tabs 3q6, stability CTH in ED showing Interval development of a 13 mm hyperdense nodule along the   inferior margin of the resection cavity which may represent progression   of disease with hemorrhage, repeat CTH stable, urine studies ordered  : Neuro stable, 12AM BMP Na114 3% increased to 40cc/hr, urine studies, pancx to r/o infection since pt is immunocompromised. Changed pantoprazole to sucralfate for GI ppx d/t thrombocytopenia. LE dopplers negative. DC'd 3%. ENT consulted to assess stoma, recommend follow up upon discharge with ENT, Repeat sodium 122. Restarted 3% at 30.  : ANA LILIA o/n neuro stable. remains on 3%@30cc/hr. Started florinef, increased 3% to 50cc/hr. Increased 3% to 75cc/hr.    7/10: continued current 3% rate o/n. Na 127 --> 125, cont 3% @75cc/hr, f/u repeat Na this evening, likely stepdown tomorrow. Pend dispo home with home PT/OT when able. Heme consulted for thrombocytopenia  : ANA LILIA o/n. Remains on 3% @75cc/hr. Decreased 3% to 50cc/hr. Started 1.2L fluid restriction. Repeat Na corrected 128  : ANA LILIA overnight, remains on 3%, SDU from ICU  : ANA LILIA overnight, neuro stable, on 3%@50, Am sodium 137, decreased 3% to 25cc/hr, repeat Na 138, decreased to 15cc/hr.  : ANA LILIA overnight, neuro stable, remains on 3%@15cc/hr  7/15: ANA LILIA overnight. Neuro exam stable. Pt remains on 3% saline.Sodium 134 this AM remains on 3%@25. Per nephrology recs hypertonic Na d'ceed, Na tabs 2q6, URENA 15g BID, cont florinef/ fluid restriction.Per nephrology Na >130 ok when ready for d/c   : ANA LILIA overnight, hypertonics d/c now on urea powder/1L fluid restriction/florinef and salt tabs per renal, hydrocortisone cream ordered for rash on back, rec for Home PT  : ANA LILIA overnight, following Na, renal following, ENT saw for hoarse voice rec followup with CT surgery for possible dilation, pending Home PT. Patient developed sudden onset severe headache. Hyperventilating with increased work of breathing. Wheezing in all lung fields to auscultation. Neuro intact on exam. Duoneb x1 ordered. Dilaudid 0.25 IV given for pain control. Subsequently patient developed nausea with multiple episodes of vomiting. Hypertensive with SBP in the 190s. Given hydralazine 10mg IVP, Zofran. Stroke code and rapid response called. STAT labs sent. CTH/CTA/CTP completed. Tachycardic to the 180s, consistent with SVT, remained hypertensive. Given 10 of labetalol, SVT broke. Transferred to Telemetry. WBC 23.43, lactate 8.5. Given 1L fluid bolus. Pan culture sent. Started empirically on Vanc/Meropenem.   : Put on eeg. Voiding while retaining urine, SC for 500cc. EEG +spikes, epilepsy consulted. CTA chest and CT A/P pending. Keppra increased 1g BID.   : Cont EEG, trend Na, possible NGT today if not able to tolerate PO. Blood cx growing GS + cocci in clusters, f/u MRSA swab and pan cx, cont meropenem and vancomycin. Vanc trough in am. F/u urine studies.   : Off EEG, neuro exam improved. ID following for concern for sepsis, likely aerobic blood cx bottle staph epi contaminant, cont monitoring off of abx, possible drug reaction as cause. F/u surveillance blood cx. Hyponatremia, repeat Na 129 overnight from 130, cont current regimen and f/u am labs and nephrology recs. AM na 129 continuing fluid restriction, nephro recommending restarting urena, but holding due to possible drug reaction previously.   : neurologically stable, c/w fluid restriction, f/u AM Na, urine osm, urine Na, cortisol. Given 15mg of tolvaptan. Fluid restriction, florinef d/c'd per nephro recs. Held salt tabs for today. 6pm BMP Na 122, 10pm BMP Na 131, urine osm 141  : ANA LILIA ovn, neuro stable. AM Na 133. Restarted salt tabs 3q6 and 1L fluid restriction. Lantus 10u at bedtime added. 9:30pm BMP per nephro Na 138  : ANA LILIA ovn, neuro stable. Na stable, discussed with nephrology can cont tolvaptan 15mg daily PRN for hyponatremia, only needed for hypoNa, can discontinue fluid restriction. Pt evaluated at bedside after nurse noted patient to have some expressive aphasia and perseverating; patient oriented to self, unable to say hospital or year, following all commands and DUMONT 5/5 strength, no drift, no facial droop, perseverating on pt's  when asked other questions. Pt afebrile, vitals stable, cont keppra 1g BID, cont decadron 4mg BID, discussed with Dr. Dorado, defer CTH at this time and monitor clinically. . Lantus increased to 14u at bedtime.  : o/n PSVT 13 beats followed by sinus tachycardia in setting of anxiety and net negative fluid balance.  Resolved to 102 with verbal comfort and further resolved with 500 cc NS. Lantus increased to 18u qhs. Dr. Costa consulted.  : ANA LILIA overnight. Neuro stable.     Vital Signs Last 24 Hrs  T(C): 36.9 (2023 22:21), Max: 36.9 (2023 22:21)  T(F): 98.4 (2023 22:21), Max: 98.4 (2023 22:21)  HR: 104 (2023 20:45) (90 - 116)  BP: 133/58 (2023 20:45) (95/63 - 141/71)  BP(mean): 84 (:45) (75 - 98)  RR: 18 (2023 20:45) (18 - 18)  SpO2: 99% (:45) (93% - 99%)    Parameters below as of 2023 20:45  Patient On (Oxygen Delivery Method): room air    I&O's Summary    2023 07:01  -  2023 07:00  --------------------------------------------------------  IN: 500 mL / OUT: 1150 mL / NET: -650 mL    2023 07:01  -  2023 00:11  --------------------------------------------------------  IN: 1300 mL / OUT: 0 mL / NET: 1300 mL    PHYSICAL EXAM:  General: Patient laying in bed. A&Ox2 (places with choices) on Room Air.   HEENT: 4mm pupils equal and reactive to light bilaterally, Extraocular muscles intact bilaterally.  Cardiovascular: Clear S1 and S2 without murmurs, gallops or rubs auscultated.  Respiratory: Clear to ausculation bilaterally without wheezing, rhonchi or rales.  GI: Soft, nontender, nondistended. Positive bowel sounds in all four quadrants.  Neuro: CN II - XII grossly intact. Mild expressive aphasia. Strength 5/5 upper and lower extremities bilaterally. Sensation intact to light touch. Opens eyes spontaneously. Following commands.   Extremities: 2+ Dorsalis Pedis and Posterior Tibial pulses bilaterally.    LABS:                        11.0   4.27  )-----------( 110      ( 2023 05:30 )             33.5     07-    138  |  103  |  21  ----------------------------<  209<H>  3.9   |  26  |  0.64    Ca    7.8<L>      2023 05:30  Phos  2.9       Mg     1.8         Urinalysis Basic - ( 2023 05:30 )    Color: x / Appearance: x / SG: x / pH: x  Gluc: 209 mg/dL / Ketone: x  / Bili: x / Urobili: x   Blood: x / Protein: x / Nitrite: x   Leuk Esterase: x / RBC: x / WBC x   Sq Epi: x / Non Sq Epi: x / Bacteria: x    CAPILLARY BLOOD GLUCOSE  POCT Blood Glucose.: 208 mg/dL (2023 21:53)  POCT Blood Glucose.: 241 mg/dL (2023 17:16)  POCT Blood Glucose.: 205 mg/dL (2023 11:45)  POCT Blood Glucose.: 184 mg/dL (2023 06:33)    Drug Levels: Vancomycin Level, Trough: 5.9 ug/mL ( @ 05:30)    Allergies: Amoxicillin (Rash)    MEDICATIONS:  Neuro:  acetaminophen     Tablet .. 650 milliGRAM(s) Oral every 6 hours PRN  escitalopram 5 milliGRAM(s) Oral daily  levETIRAcetam  IVPB 1000 milliGRAM(s) IV Intermittent every 12 hours  melatonin 5 milliGRAM(s) Oral at bedtime  ondansetron Injectable 4 milliGRAM(s) IV Push every 6 hours PRN    OTHER:  atorvastatin 20 milliGRAM(s) Oral at bedtime  benzocaine/menthol Lozenge 1 Lozenge Oral every 4 hours PRN  dexAMETHasone  Injectable 4 milliGRAM(s) IV Push every 12 hours  fluticasone propionate 50 MICROgram(s)/spray Nasal Spray 1 Spray(s) Both Nostrils two times a day  hydrocortisone 1% Cream 1 Application(s) Topical daily  insulin glargine Injectable (LANTUS) 18 Unit(s) SubCutaneous at bedtime  insulin lispro (ADMELOG) corrective regimen sliding scale   SubCutaneous Before meals and at bedtime  sucralfate 1 Gram(s) Oral every 12 hours    IVF:  sodium chloride 3 Gram(s) Oral every 6 hours    Culture Results: No growth at 5 days. ( @ 18:36), Growth in aerobic and anaerobic bottles: Staphylococcus epidermidis ( @ 18:48)    ASSESSMENT:  Pt is a 67YO male with a past medical history of type 2 diabetes, hyperlipidemia, iron deficiency anemia, tracheal stenosis s/p tracheostomy (which was closed by ENT 7 days ago); glioblastoma s/p resection 2022, and Avastin treatment 3/2023 presenting with expressive aphasia, nuasea and vomiting x1 day. CT shows new hyperdensity in left frontal parietal - surgical site c/f disease progression. Also hyponatremic to 118. Admitted for Na management. Hospital course complicated by concern for possible sepsis, ID following now off abx, hemodynamically stable.     Plan:  Neuro:  -neuro/vitals q4hrs   -CTH : Interval development of a 13 mm hyperdense nodule along the inferior margin of the resection cavity which may represent progression of disease with hemorrhage. Repeat CTH stable, CTH  stable   -CTA : negative, CTA  stable  -CTP  stable.   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  - Trend BMP  - off NS for pulm edema on CXR; off hypertonics since 7/15  - salt tabs 3q6   - s/p tolvaptan 15mg   - May need to administer D5W if patient overcorrects  - voiding, sc prn   - dc'd urea powder 15g BID started per Renal d/t petechial rash     Endo:  -ISS while on Decadron  -A1c 5.7  -lantus 18u at bedtime   -h/o T2DM: home Januvia held  -h/o HLD: c/w Atorvastatin 20mg    Heme:  -SCDs for DVT ppx, SQL and Aspirin 81 held for thrombocytopenia   -heme consulted for thrombocytopenia- w/u sent, likely chronic thrombocytopenia d/t chemotherapy agent in past   -Per heme transfuse 1U plt if bleeding and platelet count <50k, if febrile and platelet count <20k  -LE dopplers  negative  -CTA PE protocol for tachycardia: negative     ID:  -pan cx ; aerobic bottles growing GS+ cocci in clusters   -Febrile, leukocytosis and tachycardia with elevated lactic acid on   -vancomycin/meropenem empirically (-), d/c per ID and repeat blood cx     Dispo: regional status, full code, AR    D/w Dr. Dorado

## 2023-07-25 NOTE — CONSULT NOTE ADULT - SUBJECTIVE AND OBJECTIVE BOX
Patient is a 68y old  Male who presents with a chief complaint of Altered Mental Status & Weakness x2 days (25 Jul 2023 00:11)      HPI:  Osiel Norwood is a 69YO male with a past medical history of type 2 diabetes, hyperlipidemia, iron deficiency anemia, tracheal stenosis s/p tracheostomy (which was closed by ENT 7 days ago); glioblastoma s/p resection 1/27/2022, and Avastin treatment 3/2023 presenting with altered mental status and weakness x2 days. Per family, they last saw him last night around 6pm when he was at baseline. Patient has intermittent expressive aphasia but is getting worse and now is persistent, and has intermittent nausea and vomiting since last night. Patient was supposed to receive an outpatient brain MRI on 7/25/2023.  Stroke code performed in  is negative for CVA. Head CT shows new hyperdensity in frontal parietal - surgical site. MRI is ordered to rule out tumor recurrence.   Per daughter at bedside, Entresto and Eliquis were stopped per his PC - cardiologist.      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      Type 2 diabetes mellitus      Hyperlipidemia      Iron deficiency anemia      Nephrolithiasis      Thrombocytopenia      Hyponatremia      BPH (benign prostatic hyperplasia)      S/P craniotomy      H/O tracheostomy        MEDICATIONS  (STANDING):  atorvastatin 20 milliGRAM(s) Oral at bedtime  dexAMETHasone  Injectable 4 milliGRAM(s) IV Push every 12 hours  escitalopram 5 milliGRAM(s) Oral daily  fluticasone propionate 50 MICROgram(s)/spray Nasal Spray 1 Spray(s) Both Nostrils two times a day  hydrocortisone 1% Cream 1 Application(s) Topical daily  insulin glargine Injectable (LANTUS) 18 Unit(s) SubCutaneous at bedtime  insulin lispro (ADMELOG) corrective regimen sliding scale   SubCutaneous Before meals and at bedtime  levETIRAcetam  IVPB 1000 milliGRAM(s) IV Intermittent every 12 hours  melatonin 5 milliGRAM(s) Oral at bedtime  sodium chloride 3 Gram(s) Oral every 6 hours  sucralfate 1 Gram(s) Oral every 12 hours    MEDICATIONS  (PRN):  acetaminophen     Tablet .. 650 milliGRAM(s) Oral every 6 hours PRN Temp greater or equal to 38C (100.4F), Mild Pain (1 - 3)  benzocaine/menthol Lozenge 1 Lozenge Oral every 4 hours PRN Sore Throat  ondansetron Injectable 4 milliGRAM(s) IV Push every 6 hours PRN Nausea and/or Vomiting               Home Living Status :  lives with his daughter and son-in-law  in an elevator accessible apartment building           -  Prior Home Care Services :  none        Baseline Functional Level Prior to Admission :             - ADL's/ IADL's :  independent           - Ambulatory status prior to admission :   walked with a cane         FAMILY HISTORY:  FH: diabetes mellitus (Father, Mother)    FH: hyperlipidemia (Father)    FH: hypertension (Father, Mother)        CBC Full  -  ( 25 Jul 2023 05:30 )  WBC Count : 4.38 K/uL  RBC Count : 3.30 M/uL  Hemoglobin : 11.1 g/dL  Hematocrit : 33.6 %  Platelet Count - Automated : 98 K/uL  Mean Cell Volume : 101.8 fl  Mean Cell Hemoglobin : 33.6 pg  Mean Cell Hemoglobin Concentration : 33.0 gm/dL  Auto Neutrophil # : x  Auto Lymphocyte # : x  Auto Monocyte # : x  Auto Eosinophil # : x  Auto Basophil # : x  Auto Neutrophil % : x  Auto Lymphocyte % : x  Auto Monocyte % : x  Auto Eosinophil % : x  Auto Basophil % : x      07-25    139  |  104  |  17  ----------------------------<  122<H>  3.6   |  24  |  0.63    Ca    7.7<L>      25 Jul 2023 05:30  Phos  2.8     07-25  Mg     1.8     07-25        Urinalysis Basic - ( 25 Jul 2023 05:30 )    Color: x / Appearance: x / SG: x / pH: x  Gluc: 122 mg/dL / Ketone: x  / Bili: x / Urobili: x   Blood: x / Protein: x / Nitrite: x   Leuk Esterase: x / RBC: x / WBC x   Sq Epi: x / Non Sq Epi: x / Bacteria: x        Radiology :     < from: CT Brain Stroke Protocol (07.17.23 @ 15:13) >  ACC: [id number] EXAM:  CT PERFUSION W MAPS IC   ORDERED BY: ANTWAN LOOMIS     ACC: [id number] EXAM:  CT ANGIO BRAIN (W)AW IC   ORDERED BY: ANTWAN LOOMIS     ACC: [id number] EXAM:  CT BRAIN STROKE PROTOCOL   ORDERED BY: ANTWAN LOOMIS     ACC: [id number] EXAM:  CT ANGIO NECK (W)AW IC   ORDERED BY: ANTWAN LOOMIS     PROCEDURE DATE:  07/17/2023          INTERPRETATION:  CLINICAL INDICATION: History of glioblastoma. Sudden   onset headache, nausea vomiting. Code stroke.    TECHNIQUE: CT of the head was performed without the administration of   intravenous contrast. Artificial intelligence was used for preliminary   evaluation of intracranial hemorrhage utilizing RAPID software.    PERFUSION: During a contrast bolus injection of 40 mL Isovue-370, serial   5 mm transaxial cuts of perfusion data were obtained intracranially.   Following this, cerebral blood flow, cerebral blood volume and mean   transit time perfusion color maps were generated from the perfusion   source images on a separate workstation and reviewed. Perfusion data was   also processed utilizing RAPID software.    CTA of the head and neck was performed after the intravenous   administration of 80 mL Isovue-370. 3-D reconstructions were performed on   a separate workstation and reviewed.    COMPARISON: MRI brain 7/9/2023. CT head, CT angiography head and neck, CT   brain perfusion 7/72023    FINDINGS:    CT HEAD:    Patient is status post left frontal craniotomy for tumor resection.    There is stable hyperdense nodule at the inferior medial margin of the   resection cavity in the left inferior frontal lobe. There is hypodensity   which surrounds the resection cavity and also extends to involve the left   superior frontal lobe, left basal ganglia, and right inferior frontal   lobe. There is a small extra axial fluid along the left frontal   convexity, unchanged.    There is no evidence of acute infarction, intracranial hemorrhage or   hydrocephalus.    There is hypoattenuation of the subcortical and periventricular white   matter, which is nonspecific finding, but most likely represents sequela   of chronic microvascular ischemic disease. There are atherosclerotic   calcifications of the cavernous internal carotid arteries bilaterally.   There is prominence of the cortical sulci related to underlying brain   parenchymal volume loss, which appears most pronounced in the mesial   temporal lobes.    The visualized intraorbital contents are normal. There is mucosal   thickening and dependently layering fluid with associated inspissated   secretions in the right maxillary sinus, and reactive thickening of the   maxillary sinus walls. The mastoid air cells are clear. The visualized   soft tissues and osseous structures appear otherwise unremarkable.      CT DYNAMIC PERFUSION:    There is uniform distribution of cerebral blood volume and cerebral blood   flow bilaterally.  There is prolonged Tmax in the left frontal lobe, corresponding to   extensive hypodensity on structural imaging.    CBF <30% volume: 5 mL  Tmax >6.0s volume: 14 mL  Mismatch volume: 9 mL  Mismatch ratio: 2.8    Note, there is increased rCBV in the left inferior frontal lobe (series   331i, image 6), corresponding to enhancing tumor on structural imaging.      CTA NECK:    The visualized portion of the aortic arch is unremarkable in appearance.   The origins of the great vessels and the vertebral arteries are normal   without evidence of stenosis.    The common carotid arteries are patent bilaterally without evidence of   stenosis. There is no narrowing of the cervical internal carotid arteries   bilaterally.    The vertebral arteries are patent bilaterally throughout their course.   There is a mildly dominant left vertebral artery.      CTA HEAD:    Evaluation of the anterior circulation demonstrates scattered   atherosclerotic plaque along the distal internal carotid arteries.  The   proximal anterior, middle and posterior cerebral arteries are patent   bilaterally.    Evaluation of the posterior circulation demonstrates patent   vertebrobasilar system.    No evidence of vascular stenosis, occlusion, aneurysm or vascular   malformation in the [place].    Other: Enhancing lesion in the left inferior frontal lobe adjacent to the   resection cavity is favored to represent residual/recurrent tumor. Please   see MRI brain 7/9/2023 for detailed assessment.      IMPRESSION:    CT HEAD: Status post left frontal craniotomy for tumor resection. Stable   hyperdense nodule at the inferior medial margin of theresection cavity   in the left inferior frontal lobe. Extensive surrounding hypodensity in   the left frontal lobe, left basal ganglia, and right inferior frontal   lobe.    These findings were discussed with SERGIO Chino on 7/17/2023 at 3:10 PM.    CT PERFUSION: Prolonged Tmax in the left frontal lobe, corresponding to   extensive hypodensity on structural imaging. Increased rCBV in the left   inferior frontal lobe, corresponding to enhancing tumor on structural   imaging. MRI brain may be obtained for further assessment.    CT ANGIOGRAPHY NECK: No vessel narrowing or occlusion in the neck.    CT ANGIOGRAPHY HEAD: No evidence of vascular stenosis, occlusion,   aneurysm or vascular malformation.         Review of Systems : per HPI               Vital Signs Last 24 Hrs  T(C): 36.2 (25 Jul 2023 05:17), Max: 36.9 (24 Jul 2023 22:21)  T(F): 97.1 (25 Jul 2023 05:17), Max: 98.4 (24 Jul 2023 22:21)  HR: 102 (25 Jul 2023 05:30) (90 - 116)  BP: 116/58 (25 Jul 2023 05:30) (95/63 - 141/71)  BP(mean): 81 (25 Jul 2023 05:30) (75 - 98)  RR: 18 (25 Jul 2023 05:30) (18 - 18)  SpO2: 99% (25 Jul 2023 05:30) (96% - 99%)    Parameters below as of 25 Jul 2023 05:30  Patient On (Oxygen Delivery Method): room air            Physical Exam :  thin 68 y o man lying comfortably in semi Rodrigues's position , awake , alert , no acute complaints ,  intermittent exp aphasia noted    Head : normocephalic , atraumatic    Eyes : PERRLA , EOMI , no nystagmus , sclera anicteric    ENT / FACE : neg nasal discharge , uvula midline , no oropharyngeal erythema / exudate    Neck : supple , negative JVD , negative carotid bruits , no thyromegaly    Chest : CTA bilaterally , neg wheeze / rhonchi / rales / crackles / egophany    Cardiovascular : regular rate and rhythm , neg murmurs / rubs / gallops    Abdomen : soft , non distended , non tender to palpation in all 4 quadrants ,  normal bowel sounds     Extremities : WWP , neg cyanosis /clubbing / edema     Neurologic Exam :    Alert and oriented to person , place , + intermittent exp aphasia , speech fluent w/o dysarthria , follows commands     Cranial Nerves:     II :                         pupils equal , round and reactive to light , visual fields intact   III/ IV/VI :              extraocular movements intact , neg nystagmus , neg ptosis  V :                        facial sensation intact , V1-3 normal  VII :                      face symmetric , no droop , normal eye closure and smile  VIII :                     hearing intact to finger rub bilaterally  IX and X :             no hoarseness , gag intact , palate/ uvula rise symmetrically  XI :                       SCM / trapezius strength intact bilateral  XII :                      no tongue deviation    Motor Exam:          Right UE:               no focal weakness ,  > 3+/5 throughout  , no drift     Left UE:                 no focal weakness ,  > 3+/5 throughout  , no drift         Right LE:    no focal weakness ,  > 3+/5 throughout        Left LE:    no focal weakness ,  > 3+/5 throughout         Sensation :         intact to light touch x 4 extremities                            no neglect or extinction on double simultaneous testing      DTR :     biceps/brachioradialis : equal                      patella/ankle : equal     neg Babinski       Coordination :      Finger to Nose :  neg dysmetria bilaterally       P.T. assessment:  7/24/2023      Pain Assessment/Number Scale (0-10) Adult  Presence of Pain: denies pain/discomfort (Rating = 0)  Pain Rating (0-10): Rest: 0 (no pain/absence of nonverbal indicators of pain)  Pain Rating (0-10): Activity: 0 (no pain/absence of nonverbal indicators of pain)    Safety      [facility] Functional Assessment: Basic Mobility  Type of Assessment: Daily assessment  Turning from your back to your side while in a flat bed without using bedrails?: 3 = A little assistance  Moving from lying on your back to sitting on the flat side of a flat bed without using bedrails?: 3 = A little assistance  Moving to and from a bed to a chair (including a wheelchair)?: 3 = A little assistance  Standing up from a chair using your arms (e.g. wheelchair or bedside chair)?: 3 = A little assistance  Walking in hospital room?: 3 = A little assistance  Climbing 3-5 steps with a railing?: 3 = A little assistance  Score: 18   Row Comment: Ask the patient "How much help from another person do you currently need? (If the patient hasn't done an activity recently, how much help from another person do you think he/she needs if he/she tried?)    Cognitive/Neuro      Cognitive/Neuro/Behavioral  Orientation: disoriented to;  time  Speech: hypophonic    Language Assistance  Preferred Language to Address Healthcare Preferred Language to Address Healthcare: English    Therapeutic Interventions      Bed Mobility  Bed Mobility Training Rolling/Turning: minimum assist (75% patient effort);  1 person assist;  nonverbal cues (demo/gestures);  verbal cues;  bed rails  Bed Mobility Training Sit-to-Supine: minimum assist (75% patient effort);  1 person assist;  nonverbal cues (demo/gestures);  verbal cues;  bed rails  Bed Mobility Training Supine-to-Sit: minimum assist (75% patient effort);  1 person assist;  nonverbal cues (demo/gestures);  verbal cues;  bed rails  Bed Mobility Training Limitations: decreased ability to use legs for bridging/pushing;  decreased ability to use arms for pushing/pulling;  impaired ability to control trunk for mobility;  decreased strength;  impaired balance;  impaired postural control;  cognitive, decreased safety awareness    Sit-Stand Transfer Training  Transfer Training Sit-to-Stand Transfer: minimum assist (75% patient effort);  1 person assist;  nonverbal cues (demo/gestures);  verbal cues;  handheld assist   Transfer Training Stand-to-Sit Transfer: minimum assist (75% patient effort);  1 person assist;  nonverbal cues (demo/gestures);  verbal cues;  handheld assist   Sit-to-Stand Transfer Training Transfer Safety Analysis: decreased balance;  decreased step length;  decreased weight-shifting ability;  decreased strength;  impaired balance;  impaired postural control;  cognitive, decreased safety awareness;  B/L handheld assist     Gait Training  Gait Training: minimum assist (75% patient effort);  1 person assist;  nonverbal cues (demo/gestures);  verbal cues;  handheld assist ;  75 feet;  x2  Gait Analysis: 2-point gait   decreased brandyn;  crouch;  increased time in double stance;  decreased hip/knee flexion;  shuffling;  decreased step length;  decreased trunk rotation;  decreased weight-shifting ability;  decreased strength;  impaired balance;  impaired postural control;  cognitive, decreased safety awareness;  75 feet;  x2;  handheld assist     Therapeutic Exercise  Therapeutic Exercise Detail: reaching outside of BRIDGET and across midline sitting on EOB x5               PM&R Impression :     admitted for expressive aphasia, nausea and vomiting x 1 day. CT shows new hyperdensity in left frontal parietal - surgical site c/f disease progression        Disposition plan recommendation : Patient is an excellent candidate for acute rehab placement.    - Patient has an acute rehab diagnosis .    - Patient is extremely motivated in rehab and is actively participating in PT and OT and requires ongoing multiple therapy disciplines .    - Patient is not at baseline functional level .    - Patient requires an intensive inpatient acute rehabilitation therapy and can tolerate > 3 hrs of combined PT/OT/Speech per day and at least 5 days per week with a reasonable expectation that the patient will make measurable functional improvement that only an acute rehab program can provide .    - Patient requires Physiatry supervision specializing in acute inpatient rehab care .     - Has strong family support for post acute rehab stay .

## 2023-07-25 NOTE — SPEECH LANGUAGE PATHOLOGY EVALUATION - COMMENTS
Spontaneous Speech  Content: 8/10  Fluency: 2/10    Repetition: 7/10  Object Namin/10    Bedside Aphasia Score: 55/100    The Bedside Aphasia Score is an essential summary value of an individual's aphasic deficit. It is proportional to the severity of aphasia regardless of the type or etiology. The aphasia quotient is useful in assisting and distinguishing between aphasic and non-aphasic patients. An AQ of 0-25 is very severe; an AQ of 26-50 is severe; an AQ of 51-75 is moderate; and an AQ of 76 and above is mild.  The patient may be considered normal or not aphasic if the aphasia quotient is 93.8 or above. Western Aphasia Battery Revised Beside Version:     The Western Aphasia Battery-Revised (WAB-R) Beside Version was administered. The “bedside” WAB-R is a shortened version of the Western Aphasia Battery—Revised.  The battery is used to assess a patient’s language function following stroke, dementia, or other acquired neurologic disorder.  The results of the battery provide diagnostic information as to the presence, severity, and type of aphasia. It is designed to be administered at a patient’s bedside, when there are time constraints, issues of cooperation, or comorbidities that prevent more comprehensive testing.  The following results were obtained:    Auditory Verbal Comprehension  Yes/No Questions: 9/10  Sequential Commands: 2/10 Pt known to this service and was seen for dysphagia management s/p seizure activity. Pt placed on a regular diet.   Discussed case with family then. Pt able to communicate basic wants/needs. He has received speech therapy in the past. He is no longer actively receiving therapy.

## 2023-07-26 NOTE — CHART NOTE - NSCHARTNOTEFT_GEN_A_CORE
Epilepsy team contacted primary team regarding possible Keppra dose adjustment. Patient's family reported that the patient was "more lethargic" since the Keppra dose increase. We discussed risk/benefits of lowering Keppra with the family bedside. Given the documented disease progression, vEEG findings, and overall complicated medical course we recommended staying on the same dose and follow up as an outpatient after medical optimization. Family was informed that lowering the morning dose back to 750mg while keeping the PM dose at 1000mg was a possibility, but that it would increase the his risk for seizures at this time. After our conversation, patient's family stated that they wish to keep the Keppra dose as it is. Primary team made aware.

## 2023-07-26 NOTE — PROGRESS NOTE ADULT - SUBJECTIVE AND OBJECTIVE BOX
HPI:  Osiel Norwood is a 67YO male with a past medical history of type 2 diabetes, hyperlipidemia, iron deficiency anemia, tracheal stenosis s/p tracheostomy (which was closed by ENT 7 days ago); glioblastoma s/p resection 2022, and Avastin treatment 3/2023 presenting with altered mental status and weakness x2 days. Per family, they last saw him last night around 6pm when he was at baseline. Patient has intermittent expressive aphasia but is getting worse and now is persistent, and has intermittent nausea and vomiting since last night. Patient was supposed to receive an outpatient brain MRI on 2023.  Stroke code performed in  is negative for CVA. Head CT shows new hyperdensity in frontal parietal - surgical site. MRI is ordered to rule out tumor recurrence.   Per daughter at bedside, Entresto and Eliquis were stopped per his PC - cardiologist.      (2023 13:10)    Hospital Course:  : Admitted. Na 115 in ED with repeat 118, started 3% at 30cc/hr, and salt tabs 3q6, stability CTH in ED showing Interval development of a 13 mm hyperdense nodule along the   inferior margin of the resection cavity which may represent progression   of disease with hemorrhage, repeat CTH stable, urine studies ordered  : Neuro stable, 12AM BMP Na114 3% increased to 40cc/hr, urine studies, pancx to r/o infection since pt is immunocompromised. Changed pantoprazole to sucralfate for GI ppx d/t thrombocytopenia. LE dopplers negative. DC'd 3%. ENT consulted to assess stoma, recommend follow up upon discharge with ENT, Repeat sodium 122. Restarted 3% at 30.  : ANA LILIA o/n neuro stable. remains on 3%@30cc/hr. Started florinef, increased 3% to 50cc/hr. Increased 3% to 75cc/hr.    7/10: continued current 3% rate o/n. Na 127 --> 125, cont 3% @75cc/hr, f/u repeat Na this evening, likely stepdown tomorrow. Pend dispo home with home PT/OT when able. Heme consulted for thrombocytopenia  : ANA LILIA o/n. Remains on 3% @75cc/hr. Decreased 3% to 50cc/hr. Started 1.2L fluid restriction. Repeat Na corrected 128  : ANA LILIA overnight, remains on 3%, SDU from ICU  : ANA LILIA overnight, neuro stable, on 3%@50, Am sodium 137, decreased 3% to 25cc/hr, repeat Na 138, decreased to 15cc/hr.  : ANA LILIA overnight, neuro stable, remains on 3%@15cc/hr  7/15: ANA LILIA overnight. Neuro exam stable. Pt remains on 3% saline.Sodium 134 this AM remains on 3%@25. Per nephrology recs hypertonic Na d'ceed, Na tabs 2q6, URENA 15g BID, cont florinef/ fluid restriction.Per nephrology Na >130 ok when ready for d/c   : ANA LILIA overnight, hypertonics d/c now on urea powder/1L fluid restriction/florinef and salt tabs per renal, hydrocortisone cream ordered for rash on back, rec for Home PT  : ANA LILIA overnight, following Na, renal following, ENT saw for hoarse voice rec followup with CT surgery for possible dilation, pending Home PT. Patient developed sudden onset severe headache. Hyperventilating with increased work of breathing. Wheezing in all lung fields to auscultation. Neuro intact on exam. Duoneb x1 ordered. Dilaudid 0.25 IV given for pain control. Subsequently patient developed nausea with multiple episodes of vomiting. Hypertensive with SBP in the 190s. Given hydralazine 10mg IVP, Zofran. Stroke code and rapid response called. STAT labs sent. CTH/CTA/CTP completed. Tachycardic to the 180s, consistent with SVT, remained hypertensive. Given 10 of labetalol, SVT broke. Transferred to Telemetry. WBC 23.43, lactate 8.5. Given 1L fluid bolus. Pan culture sent. Started empirically on Vanc/Meropenem.   : Put on eeg. Voiding while retaining urine, SC for 500cc. EEG +spikes, epilepsy consulted. CTA chest and CT A/P pending. Keppra increased 1g BID.   : Cont EEG, trend Na, possible NGT today if not able to tolerate PO. Blood cx growing GS + cocci in clusters, f/u MRSA swab and pan cx, cont meropenem and vancomycin. Vanc trough in am. F/u urine studies.   : Off EEG, neuro exam improved. ID following for concern for sepsis, likely aerobic blood cx bottle staph epi contaminant, cont monitoring off of abx, possible drug reaction as cause. F/u surveillance blood cx. Hyponatremia, repeat Na 129 overnight from 130, cont current regimen and f/u am labs and nephrology recs. AM na 129 continuing fluid restriction, nephro recommending restarting urena, but holding due to possible drug reaction previously.   : neurologically stable, c/w fluid restriction, f/u AM Na, urine osm, urine Na, cortisol. Given 15mg of tolvaptan. Fluid restriction, florinef d/c'd per nephro recs. Held salt tabs for today. 6pm BMP Na 122, 10pm BMP Na 131, urine osm 141  : ANA LILIA ovn, neuro stable. AM Na 133. Restarted salt tabs 3q6 and 1L fluid restriction. Lantus 10u at bedtime added. 9:30pm BMP per nephro Na 138  : ANA LILIA ovn, neuro stable. Na stable, discussed with nephrology can cont tolvaptan 15mg daily PRN for hyponatremia, only needed for hypoNa, can discontinue fluid restriction. Pt evaluated at bedside after nurse noted patient to have some expressive aphasia and perseverating; patient oriented to self, unable to say hospital or year, following all commands and DUMONT 5/5 strength, no drift, no facial droop, perseverating on pt's  when asked other questions. Pt afebrile, vitals stable, cont keppra 1g BID, cont decadron 4mg BID, discussed with Dr. Dorado, defer CTH at this time and monitor clinically. . Lantus increased to 14u at bedtime.  : o/n PSVT 13 beats followed by sinus tachycardia in setting of anxiety and net negative fluid balance.  Resolved to 102 with verbal comfort and further resolved with 500 cc NS. Lantus increased to 18u qhs. Dr. Costa consulted.  : ANA LILIA overnight. Neuro stable.   : ANA LILIA overnight. Neuro exam stable.    Vital Signs Last 24 Hrs  T(C): 37 (2023 20:50), Max: 37 (2023 20:50)  T(F): 98.6 (2023 20:50), Max: 98.6 (2023 20:50)  HR: 106 (2023 20:50) (102 - 106)  BP: 110/69 (2023 20:50) (110/69 - 129/67)  BP(mean): 90 (2023 11:25) (81 - 90)  RR: 17 (2023 20:50) (16 - 18)  SpO2: 96% (2023 20:50) (96% - 99%)    Parameters below as of 2023 20:50  Patient On (Oxygen Delivery Method): room air        I&O's Summary    2023 07:01  -  2023 07:00  --------------------------------------------------------  IN: 1300 mL / OUT: 500 mL / NET: 800 mL    2023 07:01  -  2023 03:02  --------------------------------------------------------  IN: 0 mL / OUT: 800 mL / NET: -800 mL      PHYSICAL EXAM:  General: Patient laying in bed. A&Ox2 (places with choices) on Room Air.   HEENT: 4mm pupils equal and reactive to light bilaterally, Extraocular muscles intact bilaterally.  Cardiovascular: Clear S1 and S2 without murmurs, gallops or rubs auscultated.  Respiratory: Clear to ausculation bilaterally without wheezing, rhonchi or rales.  GI: Soft, nontender, nondistended. Positive bowel sounds in all four quadrants.  Neuro: CN II - XII grossly intact. Mild expressive aphasia. Strength 5/5 upper and lower extremities bilaterally. Sensation intact to light touch. Opens eyes spontaneously. Following commands.   Extremities: 2+ Dorsalis Pedis and Posterior Tibial pulses bilaterally.    LABS:                        11.1   4.38  )-----------( 98       ( 2023 05:30 )             33.6         139  |  104  |  17  ----------------------------<  122<H>  3.6   |  24  |  0.63    Ca    7.7<L>      2023 05:30  Phos  2.8       Mg     1.8     25        Urinalysis Basic - ( 2023 05:30 )    Color: x / Appearance: x / SG: x / pH: x  Gluc: 122 mg/dL / Ketone: x  / Bili: x / Urobili: x   Blood: x / Protein: x / Nitrite: x   Leuk Esterase: x / RBC: x / WBC x   Sq Epi: x / Non Sq Epi: x / Bacteria: x          CAPILLARY BLOOD GLUCOSE      POCT Blood Glucose.: 214 mg/dL (2023 21:53)  POCT Blood Glucose.: 147 mg/dL (2023 17:19)  POCT Blood Glucose.: 220 mg/dL (2023 12:07)  POCT Blood Glucose.: 90 mg/dL (2023 07:21)  POCT Blood Glucose.: 100 mg/dL (2023 06:50)      Drug Levels: [] N/A  Vancomycin Level, Trough: 5.9 ug/mL ( @ 05:30)    CSF Analysis: [] N/A      Allergies    amoxicillin (Rash)    Intolerances      MEDICATIONS:  Antibiotics:    Neuro:  acetaminophen     Tablet .. 650 milliGRAM(s) Oral every 6 hours PRN  escitalopram 5 milliGRAM(s) Oral daily  levETIRAcetam 1000 milliGRAM(s) Oral two times a day  melatonin 5 milliGRAM(s) Oral at bedtime  ondansetron Injectable 4 milliGRAM(s) IV Push every 6 hours PRN    Anticoagulation:    OTHER:  atorvastatin 20 milliGRAM(s) Oral at bedtime  benzocaine/menthol Lozenge 1 Lozenge Oral every 4 hours PRN  dexAMETHasone     Tablet 4 milliGRAM(s) Oral every 12 hours  fluticasone propionate 50 MICROgram(s)/spray Nasal Spray 1 Spray(s) Both Nostrils two times a day  hydrocortisone 1% Cream 1 Application(s) Topical daily  insulin glargine Injectable (LANTUS) 18 Unit(s) SubCutaneous at bedtime  insulin lispro (ADMELOG) corrective regimen sliding scale   SubCutaneous Before meals and at bedtime  psyllium Powder 1 Packet(s) Oral two times a day  sucralfate 1 Gram(s) Oral every 12 hours    IVF:  sodium chloride 3 Gram(s) Oral every 6 hours    CULTURES:  Culture Results:   No growth at 5 days. ( @ 18:36)  Culture Results:   Growth in aerobic and anaerobic bottles: Staphylococcus epidermidis ( @ 18:48)    RADIOLOGY & ADDITIONAL TESTS:      ASSESSMENT:  67YO male with a past medical history of type 2 diabetes, hyperlipidemia, iron deficiency anemia, tracheal stenosis s/p tracheostomy (which was closed by ENT 7 days ago); glioblastoma s/p resection 2022, and Avastin treatment 3/2023 presenting with expressive aphasia, nuasea and vomiting x1 day. CT shows new hyperdensity in left frontal parietal - surgical site c/f disease progression. Also hyponatremic to 118. Admitted for Na management. Hospital course complicated by concern for possible sepsis, ID following now off abx, hemodynamically stable.       Plan:  Neuro:  -neuro/vitals q4hrs   -CTH : Interval development of a 13 mm hyperdense nodule along the inferior margin of the resection cavity which may represent progression of disease with hemorrhage. Repeat CTH stable, CTH  stable   -CTA : negative, CTA  stable  -CTP  stable.   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  - Trend BMP  - off NS for pulm edema on CXR; off hypertonics since 7/15  - salt tabs 3q6   - s/p tolvaptan 15mg   - May need to administer D5W if patient overcorrects  - voiding, sc prn   - dc'd urea powder 15g BID started per Renal d/t petechial rash     Endo:  -ISS while on Decadron  -A1c 5.7  -lantus 18u at bedtime   -h/o T2DM: home Januvia held  -h/o HLD: c/w Atorvastatin 20mg    Heme:  -SCDs for DVT ppx, SQL and Aspirin 81 held for thrombocytopenia   -heme consulted for thrombocytopenia- w/u sent, likely chronic thrombocytopenia d/t chemotherapy agent in past   -Per heme transfuse 1U plt if bleeding and platelet count <50k, if febrile and platelet count <20k  -LE dopplers  negative  -CTA PE protocol for tachycardia: negative     ID:  -pan cx ; aerobic bottles growing GS+ cocci in clusters   -Febrile, leukocytosis and tachycardia with elevated lactic acid on   -vancomycin/meropenem empirically (-), d/c per ID and repeat blood cx     Dispo: regional status, full code, [facility]    D/w Dr. Dorado

## 2023-07-26 NOTE — PROGRESS NOTE ADULT - SUBJECTIVE AND OBJECTIVE BOX
no acute events overnight  no complaints    PHYSICAL EXAM:  Gen: NAD  Neck: supple, trachea at midline  CV: RRR, +S1/S2  Pulm: adequate respiratory effort, no increase in work of breathing  Abd: soft, ND  Skin: warm and dry,   Ext: no LE Edema   Neuro motor strength intact grossly; alert and oriented to place and person

## 2023-07-26 NOTE — PROGRESS NOTE ADULT - ASSESSMENT
68M PMHx type 2 diabetes, HLD, JAGDISH, tracheal stenosis s/p tracheostomy (which was closed by ENT 7 days ago); glioblastoma s/p resection 1/27/2022, and Avastin treatment 3/2023 presenting with expressive aphasia, nuasea and vomiting x1 day. CT shows new hyperdensity in left frontal parietal - surgical site c/f disease progression.  Patient was clinically stable for DC then developed acute deterioration on 7/17 due to SVT, fever, metabolic encephalopathy    #Hyperdensity in left frontal parietal which may represent progression of disease.   -Management per primary team   -Keppra increased to 1 g BID on 7/18/23, defer regimen to primary team   -Continue Decadron 4mg q12  -ISS + glargine while on decadron  -Pt's ASA 81 held i/s/o thrombocytopenia     #leukopenia  - WBC 2.8 today with normal ANC and low ALC also with 11.5 % bands  - afebrile  - continue to monitor; if continues to downtrend recc heme/onc f/u and repeating blood cultures    #SIRS with end organ damage, resolved  #Staph epi blood cultures  - febrile, leukocytosis and tachycardia with elevated lactic acid on 7/17  - CT C/A/P reviewed, w/o infectious source  - blood cultures likely contaminant per ID, surveillance cultures from 7/19 NGTD   - if any fever or repeat positive blood culture start Vanc and reconsult ID    #Hyponatremia   Suspected to be SIADH in the setting of intracranial pathology as well as SSRI use; sNa improving,   - Ongoing renal recs appreciated  - florinef stopped; on salt tabs 3gm q6   - trend BMP  - would avoid Urena due to possible drug reaction  - free t4 WNL    # likely Sick euthyroid syndrome  Mild decrease in Free Thyroxine, TSH wnl. possible negative feedback from high dose steroids + stress of acute illness.   Recommend repeating tfts 2 weeks after discharge.     #Rash  - per primary team started after URENA, now discontinued.  Rash resolved  - continue to monitor    #Tachycardia  Resolved    #Type II Diabetes  -A1c 5.7%  -Pt is on Januvia at home   -Continue ISS  - continue lantus to 16 units QHS, and monitor ISS.    #HLD  - Continue Atorvastatin 20mg daily     #Thrombocytopenia   -Pt's platelets are stable 70-100k; labs reviewed today.  -Heme consulted for thrombocytopenia; Per Heme documentation the pt w/  history of thrombocytopenia with his baseline range  since the latter half of last year and that the pt's Pt's thrombocytopenia likely due to past treatment with temozolomide.   Per heme transfuse 1U plt if bleeding and platelet count <50k, if febrile and platelet count <20k,   Dopplers 7/8/23 negative for DVT    #Dispo:   -[facility], acceptances pending.

## 2023-07-26 NOTE — CHART NOTE - NSCHARTNOTEFT_GEN_A_CORE
patient can tolerate 3 hours of PT/OT at acute rehab Patient can tolerate 3 hours a day/5 days per week of PT/OT at acute rehab.

## 2023-07-27 NOTE — CHART NOTE - NSCHARTNOTEFT_GEN_A_CORE
Admitting Diagnosis:   Patient is a 68y old  Male who presents with a chief complaint of Altered Mental Status & Weakness x2 days (2023 02:30)    PAST MEDICAL & SURGICAL HISTORY:  Hypertension  Glioblastoma  Grade 4 Gliosarcoma dx 2022  Type 2 diabetes mellitus  Hyperlipidemia  Iron deficiency anemia  Nephrolithiasis  Thrombocytopenia  Hyponatremia  BPH (benign prostatic hyperplasia)  S/P craniotomy  H/O tracheostomy    Current Nutrition Order:  Consistent Carbohydrate w/Evening Snack  Glucerna Shakes x3/day     PO Intake: Good (%) [ x ]  Fair (50-75%) [   ] Poor (<25%) [   ]    GI Issues:   Pt denies nausea/vomiting/diarrhea/constipation, last BM   Denies abd distension/discomfort    Pain:  Denies pain at this time    Skin Integrity:  No pressure injuries or edema documented  Vicente score 17    Labs:       133<L>  |  101  |  18  ----------------------------<  120<H>  4.0   |  23  |  0.54    Ca    7.2<L>      2023 05:30  Phos  3.5       Mg     1.7         CAPILLARY BLOOD GLUCOSE  POCT Blood Glucose.: 105 mg/dL (2023 06:25)  POCT Blood Glucose.: 168 mg/dL (2023 22:36)  POCT Blood Glucose.: 138 mg/dL (2023 17:17)  POCT Blood Glucose.: 117 mg/dL (2023 11:39)    Medications:  MEDICATIONS  (STANDING):  atorvastatin 20 milliGRAM(s) Oral at bedtime  dexAMETHasone     Tablet 4 milliGRAM(s) Oral every 12 hours  escitalopram 5 milliGRAM(s) Oral daily  fluticasone propionate 50 MICROgram(s)/spray Nasal Spray 1 Spray(s) Both Nostrils two times a day  insulin glargine Injectable (LANTUS) 18 Unit(s) SubCutaneous at bedtime  insulin lispro (ADMELOG) corrective regimen sliding scale   SubCutaneous Before meals and at bedtime  levETIRAcetam 1000 milliGRAM(s) Oral two times a day  melatonin 5 milliGRAM(s) Oral at bedtime  psyllium Powder 1 Packet(s) Oral two times a day  sodium chloride 3 Gram(s) Oral every 6 hours  sucralfate 1 Gram(s) Oral every 12 hours    MEDICATIONS  (PRN):  acetaminophen     Tablet .. 650 milliGRAM(s) Oral every 6 hours PRN Temp greater or equal to 38C (100.4F), Mild Pain (1 - 3)  benzocaine/menthol Lozenge 1 Lozenge Oral every 4 hours PRN Sore Throat  ondansetron Injectable 4 milliGRAM(s) IV Push every 6 hours PRN Nausea and/or Vomiting    Anthropometrics:  Height: 5'7"  Weight: 160lb/72.7kg  BMI: 25.1    IBW: 148lb/67.3kg    108% IBW    Weight Change:   No new weights obtained since admission. Recommend nursing to trend weights weekly. RD to continue monitoring weights as able.     Nutrition Focused Physical Exam:   Completed on , see nutrition risk notification in chart    Estimated energy needs:   Calories: 30-35kcal/k-2537kcal/d  Protein: 1.2-1.5g/k-109g/d  Defer fluids to team  Estimated needs based on ABW as within % IBW. Needs adjusted for age, DM, and malnutrition.    Subjective:   68 year old male with a past medical history of type 2 diabetes, hyperlipidemia, iron deficiency anemia, tracheal stenosis s/p tracheostomy (which was closed by ENT 7 days ago); glioblastoma s/p resection 2022, and Avastin treatment 3/2023 presenting with altered mental status, expressive aphasia, nausea/vomiting x1 day and weakness x2 days. CT shows new hyperdensity in left frontal parietal - surgical site c/f disease progression. : 1L fluid restriction. : Stroke code. : swallow eval, speech language pathologist recommends Regular with Thin liquids.     On assessment pt resting in bed. Labs and medication orders reviewed. Na 133 <L>, -168 (-). On Consistent Carbohydrate diet with Glucerna oral nutrition supplements x3/day. Pt states English is appropriate. Pt reports increased appetite and intake, endorses drinking Glucerna shakes. Denies GI distress. Denies difficulties chewing/swallowing foods, reports current diet is appropriate. Reviewed importance of adequate intake of meals/supplements. See nutrition recommendations. RD to follow-up per protocol.     Previous Nutrition Diagnosis:  Malnutrition (moderate) related to chronic disease as evidenced by <75% nutrition needs >1 month, mild muscle wasting     Active [ x ]  Resolved [   ]    Goal:  Consistently meet >75% nutrient needs.     Recommendations:  1. Continue Consistent Carbohydrate (snack) diet with Glucerna oral nutrition supplements (220kcal, 10g pro) as ordered. Defer consistency to team/SLP.   >>Encourage & monitor PO intake. [place] dietary preferences as able.   >>Monitor intake, if <75% consider liberalizing diet.   2. Monitor GI tolerance, weight trends, labs, & skin integrity.  3. Defer bowel and pain regimens to team.   4. RD to remain available for diet education/intervention prn.     Education:   Discussed importance of adequate intake and encouraged sipping on oral nutrition supplements between meals to maximize intake. Reviewed strategies to maintain good nutrition if appetite declines. Pt aware RD remains available for additional questions/concerns.     Risk Level: High [ x ] Moderate [   ] Low [   ]

## 2023-07-27 NOTE — PROGRESS NOTE ADULT - ASSESSMENT
68M PMHx type 2 diabetes, HLD, JAGDISH, tracheal stenosis s/p tracheostomy (which was closed by ENT 7 days ago); glioblastoma s/p resection 1/27/2022, and Avastin treatment 3/2023 presenting with expressive aphasia, nuasea and vomiting x1 day. CT shows new hyperdensity in left frontal parietal - surgical site c/f disease progression.  Patient was clinically stable for DC then developed acute deterioration on 7/17 due to SVT, fever, metabolic encephalopathy    #Hyperdensity in left frontal parietal which may represent progression of disease.   -Management per primary team   -Keppra increased to 1 g BID on 7/18/23, defer regimen to primary team   -Continue Decadron 4mg q12  -ISS + glargine while on decadron  -Pt's ASA 81 held i/s/o thrombocytopenia     #leukopenia  Resolved  - WBC 2.8 on 7/26 with normal ANC and low ALC also with 11.5 % bands  - afebrile  - continue to monitor; if continues to downtrend recc heme/onc f/u and repeating blood cultures    #SIRS with end organ damage, resolved  #Staph epi blood cultures  - febrile, leukocytosis and tachycardia with elevated lactic acid on 7/17  - CT C/A/P reviewed, w/o infectious source  - blood cultures likely contaminant per ID, surveillance cultures from 7/19 N[facility]D     #Hyponatremia   Suspected to be SIADH in the setting of intracranial pathology as well as SSRI use; sNa improving,   - Ongoing renal recs appreciated  - florinef stopped; on salt tabs 3gm q6   - trend BMP  - would avoid Urena due to possible drug reaction  - free t4 WNL    #Depression  - poor mood and low PO intake, family reports uses medical cannabis at home  - c/w Lexapro, can trial increased dose 10mg and monitor Na  - would avoid mirtazapine for propensity for SIADH in the elderly, avoid marinol due to seizure hx    # likely Sick euthyroid syndrome  Mild decrease in Free Thyroxine, TSH wnl. possible negative feedback from high dose steroids + stress of acute illness.   Recommend repeating tfts 2 weeks after discharge.     #Rash  - per primary team started after URENA, now discontinued.  Rash resolved  - continue to monitor    #Tachycardia  Resolved    #Type II Diabetes  -A1c 5.7%  -Pt is on Januvia at home   -Continue ISS  - continue lantus to 16 units QHS, and monitor ISS.    #HLD  - Continue Atorvastatin 20mg daily     #Thrombocytopenia   -Pt's platelets are stable 70-100k; labs reviewed today.  -Heme consulted for thrombocytopenia; Per Heme documentation the pt w/  history of thrombocytopenia with his baseline range  since the latter half of last year and that the pt's Pt's thrombocytopenia likely due to past treatment with temozolomide.   Per heme transfuse 1U plt if bleeding and platelet count <50k, if febrile and platelet count <20k,   Dopplers 7/8/23 negative for DVT    #Dispo:   -[facility], acceptances pending.

## 2023-07-27 NOTE — PROGRESS NOTE ADULT - SUBJECTIVE AND OBJECTIVE BOX
HPI:  Osiel Norwood is a 67YO male with a past medical history of type 2 diabetes, hyperlipidemia, iron deficiency anemia, tracheal stenosis s/p tracheostomy (which was closed by ENT 7 days ago); glioblastoma s/p resection 2022, and Avastin treatment 3/2023 presenting with altered mental status and weakness x2 days. Per family, they last saw him last night around 6pm when he was at baseline. Patient has intermittent expressive aphasia but is getting worse and now is persistent, and has intermittent nausea and vomiting since last night. Patient was supposed to receive an outpatient brain MRI on 2023.  Stroke code performed in  is negative for CVA. Head CT shows new hyperdensity in frontal parietal - surgical site. MRI is ordered to rule out tumor recurrence.   Per daughter at bedside, Entresto and Eliquis were stopped per his PC - cardiologist.      (2023 13:10)    INTERVAL EVENTS:  Multiple BMs o/n, off bowel reg    HOSPITAL COURSE:  : Admitted. Na 115 in ED with repeat 118, started 3% at 30cc/hr, and salt tabs 3q6, stability CTH in ED showing Interval development of a 13 mm hyperdense nodule along the   inferior margin of the resection cavity which may represent progression   of disease with hemorrhage, repeat CTH stable, urine studies ordered  : Neuro stable, 12AM BMP Na114 3% increased to 40cc/hr, urine studies, pancx to r/o infection since pt is immunocompromised. Changed pantoprazole to sucralfate for GI ppx d/t thrombocytopenia. LE dopplers negative. DC'd 3%. ENT consulted to assess stoma, recommend follow up upon discharge with ENT, Repeat sodium 122. Restarted 3% at 30.  : ANA LILIA o/n neuro stable. remains on 3%@30cc/hr. Started florinef, increased 3% to 50cc/hr. Increased 3% to 75cc/hr.    7/10: continued current 3% rate o/n. Na 127 --> 125, cont 3% @75cc/hr, f/u repeat Na this evening, likely stepdown tomorrow. Pend dispo home with home PT/OT when able. Heme consulted for thrombocytopenia  : ANA LILIA o/n. Remains on 3% @75cc/hr. Decreased 3% to 50cc/hr. Started 1.2L fluid restriction. Repeat Na corrected 128  : ANA LILIA overnight, remains on 3%, SDU from ICU  : ANA LILIA overnight, neuro stable, on 3%@50, Am sodium 137, decreased 3% to 25cc/hr, repeat Na 138, decreased to 15cc/hr.  : ANA LILIA overnight, neuro stable, remains on 3%@15cc/hr  7/15: ANA LILIA overnight. Neuro exam stable. Pt remains on 3% saline.Sodium 134 this AM remains on 3%@25. Per nephrology recs hypertonic Na d'ceed, Na tabs 2q6, URENA 15g BID, cont florinef/ fluid restriction.Per nephrology Na >130 ok when ready for d/c   : ANA LILIA overnight, hypertonics d/c now on urea powder/1L fluid restriction/florinef and salt tabs per renal, hydrocortisone cream ordered for rash on back, rec for Home PT  : ANA LILIA overnight, following Na, renal following, ENT saw for hoarse voice rec followup with CT surgery for possible dilation, pending Home PT. Patient developed sudden onset severe headache. Hyperventilating with increased work of breathing. Wheezing in all lung fields to auscultation. Neuro intact on exam. Duoneb x1 ordered. Dilaudid 0.25 IV given for pain control. Subsequently patient developed nausea with multiple episodes of vomiting. Hypertensive with SBP in the 190s. Given hydralazine 10mg IVP, Zofran. Stroke code and rapid response called. STAT labs sent. CTH/CTA/CTP completed. Tachycardic to the 180s, consistent with SVT, remained hypertensive. Given 10 of labetalol, SVT broke. Transferred to Telemetry. WBC 23.43, lactate 8.5. Given 1L fluid bolus. Pan culture sent. Started empirically on Vanc/Meropenem.   : Put on eeg. Voiding while retaining urine, SC for 500cc. EEG +spikes, epilepsy consulted. CTA chest and CT A/P pending. Keppra increased 1g BID.   : Cont EEG, trend Na, possible NGT today if not able to tolerate PO. Blood cx growing GS + cocci in clusters, f/u MRSA swab and pan cx, cont meropenem and vancomycin. Vanc trough in am. F/u urine studies.   : Off EEG, neuro exam improved. ID following for concern for sepsis, likely aerobic blood cx bottle staph epi contaminant, cont monitoring off of abx, possible drug reaction as cause. F/u surveillance blood cx. Hyponatremia, repeat Na 129 overnight from 130, cont current regimen and f/u am labs and nephrology recs. AM na 129 continuing fluid restriction, nephro recommending restarting urena, but holding due to possible drug reaction previously.   : neurologically stable, c/w fluid restriction, f/u AM Na, urine osm, urine Na, cortisol. Given 15mg of tolvaptan. Fluid restriction, florinef d/c'd per nephro recs. Held salt tabs for today. 6pm BMP Na 122, 10pm BMP Na 131, urine osm 141  : ANA LILIA ovn, neuro stable. AM Na 133. Restarted salt tabs 3q6 and 1L fluid restriction. Lantus 10u at bedtime added. 9:30pm BMP per nephro Na 138  : ANA LILIA ovn, neuro stable. Na stable, discussed with nephrology can cont tolvaptan 15mg daily PRN for hyponatremia, only needed for hypoNa, can discontinue fluid restriction. Pt evaluated at bedside after nurse noted patient to have some expressive aphasia and perseverating; patient oriented to self, unable to say hospital or year, following all commands and DUMONT 5/5 strength, no drift, no facial droop, perseverating on pt's  when asked other questions. Pt afebrile, vitals stable, cont keppra 1g BID, cont decadron 4mg BID, discussed with Dr. Dorado, defer CTH at this time and monitor clinically. . Lantus increased to 14u at bedtime.  : o/n PSVT 13 beats followed by sinus tachycardia in setting of anxiety and net negative fluid balance.  Resolved to 102 with verbal comfort and further resolved with 500 cc NS. Lantus increased to 18u qhs. Dr. Costa consulted.  : ANA LILIA overnight. Neuro stable.   : ANA LILIA overnight. Neuro exam stable. Dispo pending.  : multiple BMs o/n. Na stable    Vital Signs Last 24 Hrs  T(C): 36.6 (2023 21:19), Max: 36.7 (2023 16:13)  T(F): 97.9 (2023 21:19), Max: 98 (2023 16:13)  HR: 94 (2023 21:19) (94 - 106)  BP: 101/66 (2023 21:19) (101/66 - 125/78)  BP(mean): --  RR: 16 (:19) (16 - 17)  SpO2: 95% (:19) (95% - 98%)    Parameters below as of :19  Patient On (Oxygen Delivery Method): room air        I&O's Summary    2023 07:01  -  2023 07:00  --------------------------------------------------------  IN: 0 mL / OUT: 800 mL / NET: -800 mL    2023 07:01  -  2023 02:31  --------------------------------------------------------  IN: 0 mL / OUT: 200 mL / NET: -200 mL        PHYSICAL EXAM:  General: Patient laying in bed. A&Ox2 (places with choices) on Room Air.   HEENT: 4mm pupils equal and reactive to light bilaterally, EOMs intact bilaterally.  Cardiovascular: Clear S1 and S2 without murmurs, gallops or rubs auscultated.  Respiratory: Clear to ausculation bilaterally without wheezing, rhonchi or rales.  GI: Soft, nontender, nondistended. Positive bowel sounds in all four quadrants.  Neuro: CN II - XII grossly intact. Mild expressive aphasia. Strength 5/5 upper and lower extremities bilaterally. Sensation intact to light touch. Opens eyes spontaneously. Following commands.   Extremities: 2+ Dorsalis Pedis and Posterior Tibial pulses bilaterally.    LABS:                        10.4   2.80  )-----------( 100      ( 2023 07:21 )             31.5         137  |  102  |  20  ----------------------------<  174<H>  4.6   |  26  |  0.76    Ca    7.9<L>      2023 07:21  Phos  3.1       Mg     1.8             Urinalysis Basic - ( 2023 07:21 )    Color: x / Appearance: x / SG: x / pH: x  Gluc: 174 mg/dL / Ketone: x  / Bili: x / Urobili: x   Blood: x / Protein: x / Nitrite: x   Leuk Esterase: x / RBC: x / WBC x   Sq Epi: x / Non Sq Epi: x / Bacteria: x          CAPILLARY BLOOD GLUCOSE      POCT Blood Glucose.: 168 mg/dL (2023 22:36)  POCT Blood Glucose.: 138 mg/dL (2023 17:17)  POCT Blood Glucose.: 117 mg/dL (2023 11:39)  POCT Blood Glucose.: 157 mg/dL (2023 06:30)      Drug Levels: [] N/A    CSF Analysis: [] N/A      Allergies    amoxicillin (Rash)    Intolerances      MEDICATIONS:  Antibiotics:    Neuro:  acetaminophen     Tablet .. 650 milliGRAM(s) Oral every 6 hours PRN  escitalopram 5 milliGRAM(s) Oral daily  levETIRAcetam 1000 milliGRAM(s) Oral two times a day  melatonin 5 milliGRAM(s) Oral at bedtime  ondansetron Injectable 4 milliGRAM(s) IV Push every 6 hours PRN    Anticoagulation:    OTHER:  atorvastatin 20 milliGRAM(s) Oral at bedtime  benzocaine/menthol Lozenge 1 Lozenge Oral every 4 hours PRN  dexAMETHasone     Tablet 4 milliGRAM(s) Oral every 12 hours  fluticasone propionate 50 MICROgram(s)/spray Nasal Spray 1 Spray(s) Both Nostrils two times a day  insulin glargine Injectable (LANTUS) 18 Unit(s) SubCutaneous at bedtime  insulin lispro (ADMELOG) corrective regimen sliding scale   SubCutaneous Before meals and at bedtime  psyllium Powder 1 Packet(s) Oral two times a day  sucralfate 1 Gram(s) Oral every 12 hours    IVF:  sodium chloride 3 Gram(s) Oral every 6 hours    CULTURES:  Culture Results:   No growth at 5 days. ( @ 18:36)  Culture Results:   Growth in aerobic and anaerobic bottles: Staphylococcus epidermidis ( @ 18:48)    RADIOLOGY & ADDITIONAL TESTS:      ASSESSMENT:  Pt is a 67YO male with a past medical history of type 2 diabetes, hyperlipidemia, iron deficiency anemia, tracheal stenosis s/p tracheostomy (which was closed by ENT 7 days ago); glioblastoma s/p resection 2022, and Avastin treatment 3/2023 presenting with expressive aphasia, nuasea and vomiting x1 day. CT shows new hyperdensity in left frontal parietal - surgical site c/f disease progression. Also hyponatremic to 118. Admitted for Na management. Hospital course complicated by concern for possible sepsis, ID following now off abx, hemodynamically stable.     Plan:  Neuro:  -neuro/vitals q4hrs   -CTH : Interval development of a 13 mm hyperdense nodule along the inferior margin of the resection cavity which may represent progression of disease with hemorrhage. Repeat CTH stable, CTH  stable   -CTA : negative, CTA  stable  -CTP  stable.   -pain control prn  -Keppra increased to 1g BID 2023 per epilepsy recs for sharp waves seen on EEG  -Decadron 4mg BID  -c/w home Lexapro 5mg  -home ASA 81 held i/s/o thrombocytopenia   -MRI brain  suspicious for recurrent tumor and hydro   -palliative following     Pulm:  -satting well on RA    Cardio:  --160    GI:  -CCD   -bowel regimen held for loose stools  -sucralfate for ulcer ppx while on steroids  -last BM     Renal:  - Hyponatremic (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  - Trend BMP  - off NS for pulm edema on CXR; off hypertonics since 7/15  - salt tabs 3q6   - s/p tolvaptan 15mg   - May need to administer D5W if patient overcorrects  - voiding, sc prn   - dc'd urea powder 15g BID started per Renal d/t petechial rash     Endo:  -ISS while on Decadron  -A1c 5.7  -lantus 18u at bedtime   -h/o T2DM: home Januvia held  -h/o HLD: c/w Atorvastatin 20mg    Heme:  -SCDs for DVT ppx, SQL and Aspirin 81 held for thrombocytopenia   -heme consulted for thrombocytopenia- w/u sent, likely chronic thrombocytopenia d/t chemotherapy agent in past   -Per heme transfuse 1U plt if bleeding and platelet count <50k, if febrile and platelet count <20k  -LE dopplers  negative  -CTA PE protocol for tachycardia: negative     ID:  -pan cx ; aerobic bottles growing GS+ cocci in clusters   -Febrile, leukocytosis and tachycardia with elevated lactic acid on   -vancomycin/meropenem empirically (-), d/c per ID and repeat blood cx     Dispo: regional status, full code, AR    D/w Dr. Dorado

## 2023-07-27 NOTE — PROGRESS NOTE ADULT - SUBJECTIVE AND OBJECTIVE BOX
Physical Medicine and Rehabilitation Progress Note :       Patient is a 68y old  Male who presents with a chief complaint of Altered Mental Status & Weakness x2 days (27 Jul 2023 02:30)      HPI:  Osiel Norwood is a 67YO male with a past medical history of type 2 diabetes, hyperlipidemia, iron deficiency anemia, tracheal stenosis s/p tracheostomy (which was closed by ENT 7 days ago); glioblastoma s/p resection 1/27/2022, and Avastin treatment 3/2023 presenting with altered mental status and weakness x2 days. Per family, they last saw him last night around 6pm when he was at baseline. Patient has intermittent expressive aphasia but is getting worse and now is persistent, and has intermittent nausea and vomiting since last night. Patient was supposed to receive an outpatient brain MRI on 7/25/2023.  Stroke code performed in  is negative for CVA. Head CT shows new hyperdensity in frontal parietal - surgical site. MRI is ordered to rule out tumor recurrence.   Per daughter at bedside, Entresto and Eliquis were stopped per his PC - cardiologist.      (07 Jul 2023 13:10)                            9.5    3.96  )-----------( 88       ( 27 Jul 2023 05:30 )             28.3       07-27    133<L>  |  101  |  18  ----------------------------<  120<H>  4.0   |  23  |  0.54    Ca    7.2<L>      27 Jul 2023 05:30  Phos  3.5     07-27  Mg     1.7     07-27      Vital Signs Last 24 Hrs  T(C): 36.6 (27 Jul 2023 09:24), Max: 36.7 (26 Jul 2023 16:13)  T(F): 97.8 (27 Jul 2023 09:24), Max: 98 (26 Jul 2023 16:13)  HR: 97 (27 Jul 2023 09:24) (91 - 106)  BP: 109/66 (27 Jul 2023 09:24) (101/66 - 109/66)  BP(mean): --  RR: 16 (27 Jul 2023 09:24) (16 - 18)  SpO2: 96% (27 Jul 2023 09:24) (95% - 96%)    Parameters below as of 27 Jul 2023 09:24  Patient On (Oxygen Delivery Method): room air        MEDICATIONS  (STANDING):  atorvastatin 20 milliGRAM(s) Oral at bedtime  dexAMETHasone     Tablet 4 milliGRAM(s) Oral every 12 hours  escitalopram 5 milliGRAM(s) Oral daily  fluticasone propionate 50 MICROgram(s)/spray Nasal Spray 1 Spray(s) Both Nostrils two times a day  insulin glargine Injectable (LANTUS) 18 Unit(s) SubCutaneous at bedtime  insulin lispro (ADMELOG) corrective regimen sliding scale   SubCutaneous Before meals and at bedtime  levETIRAcetam 1000 milliGRAM(s) Oral two times a day  melatonin 5 milliGRAM(s) Oral at bedtime  psyllium Powder 1 Packet(s) Oral two times a day  sodium chloride 3 Gram(s) Oral every 6 hours  sucralfate 1 Gram(s) Oral every 12 hours    MEDICATIONS  (PRN):  acetaminophen     Tablet .. 650 milliGRAM(s) Oral every 6 hours PRN Temp greater or equal to 38C (100.4F), Mild Pain (1 - 3)  benzocaine/menthol Lozenge 1 Lozenge Oral every 4 hours PRN Sore Throat  ondansetron Injectable 4 milliGRAM(s) IV Push every 6 hours PRN Nausea and/or Vomiting      7/26/2023  Functional Status Assessment :       Pain Assessment/Number Scale (0-10) Adult  Presence of Pain: denies pain/discomfort (Rating = 0)  Pain Rating (0-10): Rest: 0 (no pain/absence of nonverbal indicators of pain)  Pain Rating (0-10): Activity: 0 (no pain/absence of nonverbal indicators of pain)    Safety      [facility] Functional Assessment: Basic Mobility  Type of Assessment: Daily assessment  Turning from your back to your side while in a flat bed without using bedrails?: 3 = A little assistance  Moving from lying on your back to sitting on the flat side of a flat bed without using bedrails?: 3 = A little assistance  Moving to and from a bed to a chair (including a wheelchair)?: 3 = A little assistance  Standing up from a chair using your arms (e.g. wheelchair or bedside chair)?: 3 = A little assistance  Walking in hospital room?: 3 = A little assistance  Climbing 3-5 steps with a railing?: 3 = A little assistance  Score: 18   Row Comment: Ask the patient "How much help from another person do you currently need? (If the patient hasn't done an activity recently, how much help from another person do you think he/she needs if he/she tried?)    Cognitive/Neuro      Cognitive/Neuro/Behavioral  Orientation: disoriented to;  time  Speech: hypophonic  Mood/Behavior: sad    Language Assistance  Preferred Language to Address Healthcare Preferred Language to Address Healthcare: Serbian  Preferred Sign Language Preferred Sign Language: understands english     Therapeutic Interventions      Bed Mobility  Bed Mobility Training Rolling/Turning: minimum assist (75% patient effort);  1 person assist;  nonverbal cues (demo/gestures);  verbal cues;  bed rails  Bed Mobility Training Scooting: minimum assist (75% patient effort);  1 person assist;  nonverbal cues (demo/gestures);  verbal cues;  bed rails  Bed Mobility Training Sit-to-Supine: minimum assist (75% patient effort);  1 person assist;  verbal cues;  nonverbal cues (demo/gestures);  bed rails  Bed Mobility Training Supine-to-Sit: minimum assist (75% patient effort);  1 person assist;  verbal cues;  nonverbal cues (demo/gestures);  bed rails  Bed Mobility Training Limitations: decreased ability to use legs for bridging/pushing;  decreased ability to use arms for pushing/pulling;  impaired ability to control trunk for mobility;  decreased strength;  impaired balance;  impaired postural control;  cognitive, decreased safety awareness    Sit-Stand Transfer Training  Transfer Training Sit-to-Stand Transfer: minimum assist (75% patient effort);  1 person assist;  nonverbal cues (demo/gestures);  verbal cues;  handheld assist   Transfer Training Stand-to-Sit Transfer: minimum assist (75% patient effort);  1 person assist;  nonverbal cues (demo/gestures);  verbal cues;  handheld assist   Sit-to-Stand Transfer Training Transfer Safety Analysis: decreased balance;  decreased cognition;  decreased sequencing ability;  decreased step length;  decreased weight-shifting ability;  decreased strength;  impaired balance;  impaired postural control;  cognitive, decreased safety awareness;  handheld assist     Gait Training  Gait Training: minimum assist (75% patient effort);  1 person assist;  nonverbal cues (demo/gestures);  verbal cues;  handheld assist ;  5 feet  Gait Analysis: 3-point gait   decreased brandyn;  crouch;  increased time in double stance;  decreased hip/knee flexion;  shuffling;  decreased step length;  decreased trunk rotation;  decreased weight-shifting ability;  decreased strength;  impaired balance;  impaired postural control;  cognitive, decreased safety awareness;  5 feet;  handheld assist     Therapeutic Exercise  Therapeutic Exercise Detail: weight shifting in standing x30sec       AM-[facility] Functional Assessment: Daily Activity  Type of Assessment: Daily assessment  Putting on and taking off regular lower body clothing?: 3 = A little assistance  Bathing (including washing, rinsing, drying)?: 3 = A little assistance  Toileting, which includes using toilet, bedpan or urinal?: 3 = A little assistance  Putting on and taking off regular upper body clothing?: 3 = A little assistance  Take care of personal grooming such as brushing teeth?: 3 = A little assistance  Eating meals?: 4 = No assist / stand by assistance  Score: 19   Row Comment: Ask the patient "How much help from another person do you currently need? (If the patient hasn't done an activity recently, how much help from another person do you think he/she needs if he/she tried?)    Cognitive/Neuro      Cognitive/Neuro/Behavioral  Level of Consciousness: flat affect  Arousal Level: arouses to voice  Speech: hypophonic  Mood/Behavior: calm    Language Assistance  Preferred Language to Address Healthcare Preferred Language to Address Healthcare: Serbian  's name: Patient speaks and understands English     Therapeutic Interventions      Bed Mobility  Bed Mobility Training Sit-to-Supine: minimum assist (75% patient effort);  verbal cues;  2 person assist  Bed Mobility Training Supine-to-Sit: minimum assist (75% patient effort);  2 person assist;  verbal cues  Bed Mobility Training Limitations: decreased strength;  impaired balance    Sit-Stand Transfer Training  Transfer Training Sit-to-Stand Transfer: minimum assist (75% patient effort);  1 person assist;  verbal cues;  HHA  Transfer Training Stand-to-Sit Transfer: minimum assist (75% patient effort);  1 person assist;  verbal cues;  HHA  Sit-to-Stand Transfer Training Transfer Safety Analysis: decreased balance;  decreased weight-shifting ability;  impaired balance;  decreased strength    Therapeutic Exercise  Therapeutic Exercise Detail: Pt performed sit <>stand x3 with Min Ax1; tolerated standing total 3 minutes with Min A for balance while motivating patient to move B hands and legs to beat of music. Patient presenting with flat affect, patient benefited from increase motivation.               PM&R Impression : as above    Current Disposition Plan Recommendation :   acute rehab placement

## 2023-07-27 NOTE — PROGRESS NOTE ADULT - SUBJECTIVE AND OBJECTIVE BOX
Patient is a 68y old  Male who presents with a chief complaint of Altered Mental Status & Weakness x2 days (27 Jul 2023 13:03)      SUBJECTIVE:  Patient seen and examined at bedside.  Reports feeling well, states he is eating a good portion of his food.  Denies fever, chills, CP, SOB, N/V, abdominal pain.    ROS: All other ROS negative except as above    Vital Signs Last 12 Hrs  T(F): 97.8 (07-27-23 @ 09:24), Max: 97.8 (07-27-23 @ 09:24)  HR: 97 (07-27-23 @ 09:24) (97 - 97)  BP: 109/66 (07-27-23 @ 09:24) (109/66 - 109/66)  BP(mean): --  RR: 16 (07-27-23 @ 09:24) (16 - 16)  SpO2: 96% (07-27-23 @ 09:24) (96% - 96%)  I&O's Summary    26 Jul 2023 07:01  -  27 Jul 2023 07:00  --------------------------------------------------------  IN: 0 mL / OUT: 200 mL / NET: -200 mL    27 Jul 2023 07:01  -  27 Jul 2023 19:53  --------------------------------------------------------  IN: 800 mL / OUT: 1000 mL / NET: -200 mL        PHYSICAL EXAM:  Constitutional: NAD, comfortable in bed.  HEENT: PERRLA, EOMI, MMM  Neck: Supple  Respiratory: CTA B/L. No w/r/r.   Cardiovascular: RRR, normal S1 and S2, no m/r/g.   Gastrointestinal: +BS, soft NTND  Extremities: wwp; no cyanosis, clubbing or edema.   Vascular: Pulses equal and strong throughout.   Neurological: AAOx2-3, no strength deficits  Skin: No gross skin abnormalities or rashes        LABS:                        9.5    3.96  )-----------( 88       ( 27 Jul 2023 05:30 )             28.3     07-27    133<L>  |  101  |  18  ----------------------------<  120<H>  4.0   |  23  |  0.54    Ca    7.2<L>      27 Jul 2023 05:30  Phos  3.5     07-27  Mg     1.7     07-27        Urinalysis Basic - ( 27 Jul 2023 05:30 )    Color: x / Appearance: x / SG: x / pH: x  Gluc: 120 mg/dL / Ketone: x  / Bili: x / Urobili: x   Blood: x / Protein: x / Nitrite: x   Leuk Esterase: x / RBC: x / WBC x   Sq Epi: x / Non Sq Epi: x / Bacteria: x          RADIOLOGY & ADDITIONAL TESTS:  No new imaging    MEDICATIONS  (STANDING):  atorvastatin 20 milliGRAM(s) Oral at bedtime  dexAMETHasone     Tablet 4 milliGRAM(s) Oral every 12 hours  escitalopram 10 milliGRAM(s) Oral daily  fluticasone propionate 50 MICROgram(s)/spray Nasal Spray 1 Spray(s) Both Nostrils two times a day  insulin glargine Injectable (LANTUS) 18 Unit(s) SubCutaneous at bedtime  insulin lispro (ADMELOG) corrective regimen sliding scale   SubCutaneous Before meals and at bedtime  levETIRAcetam 1000 milliGRAM(s) Oral two times a day  melatonin 5 milliGRAM(s) Oral at bedtime  psyllium Powder 1 Packet(s) Oral two times a day  sodium chloride 3 Gram(s) Oral every 6 hours  sucralfate 1 Gram(s) Oral every 12 hours    MEDICATIONS  (PRN):  acetaminophen     Tablet .. 650 milliGRAM(s) Oral every 6 hours PRN Temp greater or equal to 38C (100.4F), Mild Pain (1 - 3)  benzocaine/menthol Lozenge 1 Lozenge Oral every 4 hours PRN Sore Throat  ondansetron Injectable 4 milliGRAM(s) IV Push every 6 hours PRN Nausea and/or Vomiting

## 2023-07-27 NOTE — PROGRESS NOTE ADULT - ASSESSMENT
Neurosurgery     68 y o male with a past medical history of type 2 diabetes, hyperlipidemia, iron deficiency anemia, tracheal stenosis s/p tracheostomy (which was closed by ENT 7 days ago); glioblastoma s/p resection 1/27/2022, and Avastin treatment 3/2023 presenting with expressive aphasia, nuasea and vomiting x1 day. CT shows new hyperdensity in left frontal parietal - surgical site c/f disease progression. Also hyponatremic to 118. Admitted for Na management. Hospital course complicated by concern for possible sepsis, ID following now off abx, hemodynamically stable.     Plan:  Neuro:  -neuro/vitals q4hrs   -CTH 7/7: Interval development of a 13 mm hyperdense nodule along the inferior margin of the resection cavity which may represent progression of disease with hemorrhage. Repeat CTH stable, CTH 7/17 stable   -CTA 7/7: negative, CTA 7/17 stable  -CTP 7/17 stable.   -pain control prn  -Keppra increased to 1g BID 7/18/2023 per epilepsy recs for sharp waves seen on EEG  -Decadron 4mg BID  -c/w home Lexapro 5mg  -home ASA 81 held i/s/o thrombocytopenia   -MRI brain 7/9 suspicious for recurrent tumor and hydro   -palliative following     Pulm:  -satting well on RA    Cardio:  --160    GI:  -CCD   -bowel regimen held for loose stools  -sucralfate for ulcer ppx while on steroids  -last BM 7/27    Renal:  - Hyponatremic (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 as well as SSRI use), improving: Na 118 on admission -> 138 on 7/24.  - Trend BMP  - off NS for pulm edema on CXR; off hypertonics since 7/15  - salt tabs 3q6   - s/p tolvaptan 15mg 7/21  - May need to administer D5W if patient overcorrects  - voiding, sc prn   - dc'd urea powder 15g BID started per Renal d/t petechial rash     Endo:  -ISS while on Decadron  -A1c 5.7  -lantus 18u at bedtime   -h/o T2DM: home Januvia held  -h/o HLD: c/w Atorvastatin 20mg    Heme:  -SCDs for DVT ppx, SQL and Aspirin 81 held for thrombocytopenia   -heme consulted for thrombocytopenia- w/u sent, likely chronic thrombocytopenia d/t chemotherapy agent in past   -Per heme transfuse 1U plt if bleeding and platelet count <50k, if febrile and platelet count <20k  -LE dopplers 7/8 negative  -CTA PE protocol for tachycardia: negative     ID:  -pan cx 7/7; aerobic bottles growing GS+ cocci in clusters   -Febrile, leukocytosis and tachycardia with elevated lactic acid on 7/17  -vancomycin/meropenem empirically (7/17-19), d/c per ID and repeat blood cx 7/19    Dispo: regional status, full code, AR

## 2023-07-28 NOTE — PROGRESS NOTE ADULT - ASSESSMENT
68M PMHx type 2 diabetes, HLD, JAGDISH, tracheal stenosis s/p tracheostomy (which was closed by ENT 7 days ago); glioblastoma s/p resection 1/27/2022, and Avastin treatment 3/2023 presenting with expressive aphasia, nuasea and vomiting x1 day. CT shows new hyperdensity in left frontal parietal - surgical site c/f disease progression.  Patient was clinically stable for DC then developed acute deterioration on 7/17 due to SVT, fever, metabolic encephalopathy    #Fever  - temp 100.9, increased lethargy, darkened urine  - s/p 500cc earlier today, would give additional L of IVF and continue on mIVF  - f/u repeat CBC, CMP, CXR, abdominal XR  - UA negative, high spec grav  - unclear source, ddx includes intrabdominal source given vomiting, abd distension  - f/u blood cultures    #Hyperdensity in left frontal parietal which may represent progression of disease.   -Management per primary team   -Keppra increased to 1 g BID on 7/18/23, defer regimen to primary team   -Continue Decadron 4mg q12  -ISS + glargine while on decadron  -Pt's ASA 81 held i/s/o thrombocytopenia     #leukopenia  Resolved  - WBC 2.8 on 7/26 with normal ANC and low ALC also with 11.5 % bands  - afebrile  - continue to monitor; if continues to downtrend recc heme/onc f/u and repeating blood cultures    #SIRS with end organ damage, resolved  #Staph epi blood cultures  - febrile, leukocytosis and tachycardia with elevated lactic acid on 7/17  - CT C/A/P reviewed, w/o infectious source  - blood cultures likely contaminant per ID, surveillance cultures from 7/19 NGTD     #Hyponatremia   Suspected to be SIADH in the setting of intracranial pathology as well as SSRI use; sNa improving,   - Ongoing renal recs appreciated  - florinef stopped; on salt tabs 3gm q6   - trend BMP  - would avoid Urena due to possible drug reaction  - free t4 WNL    #Depression  - poor mood and low PO intake, family reports uses medical cannabis at home  - c/w Lexapro, can trial increased dose 10mg and monitor Na  - would avoid mirtazapine for propensity for SIADH in the elderly, avoid marinol due to seizure hx    # likely Sick euthyroid syndrome  Mild decrease in Free Thyroxine, TSH wnl. possible negative feedback from high dose steroids + stress of acute illness.   Recommend repeating tfts 2 weeks after discharge.     #Rash  - per primary team started after URENA, now discontinued.  Rash resolved  - continue to monitor    #Type II Diabetes  -A1c 5.7%  -Pt is on Januvia at home   -Continue ISS  - continue lantus to 16 units QHS, and monitor ISS.    #HLD  - Continue Atorvastatin 20mg daily     #Thrombocytopenia   -Pt's platelets are stable 70-100k; labs reviewed today.  -Heme consulted for thrombocytopenia; Per Heme documentation the pt w/  history of thrombocytopenia with his baseline range  since the latter half of last year and that the pt's Pt's thrombocytopenia likely due to past treatment with temozolomide.   Per heme transfuse 1U plt if bleeding and platelet count <50k, if febrile and platelet count <20k,   Dopplers 7/8/23 negative for DVT    #Dispo:   -AR, acceptances pending.

## 2023-07-28 NOTE — PROGRESS NOTE ADULT - SUBJECTIVE AND OBJECTIVE BOX
HPI:  Osiel Norwood is a 67YO male with a past medical history of type 2 diabetes, hyperlipidemia, iron deficiency anemia, tracheal stenosis s/p tracheostomy (which was closed by ENT 7 days ago); glioblastoma s/p resection 2022, and Avastin treatment 3/2023 presenting with altered mental status and weakness x2 days. Per family, they last saw him last night around 6pm when he was at baseline. Patient has intermittent expressive aphasia but is getting worse and now is persistent, and has intermittent nausea and vomiting since last night. Patient was supposed to receive an outpatient brain MRI on 2023.  Stroke code performed in  is negative for CVA. Head CT shows new hyperdensity in frontal parietal - surgical site. MRI is ordered to rule out tumor recurrence.   Per daughter at bedside, Entresto and Eliquis were stopped per his PC - cardiologist.       HOSPITAL COURSE:  : Admitted. Na 115 in ED with repeat 118, started 3% at 30cc/hr, and salt tabs 3q6, stability CTH in ED showing Interval development of a 13 mm hyperdense nodule along the   inferior margin of the resection cavity which may represent progression   of disease with hemorrhage, repeat CTH stable, urine studies ordered  : Neuro stable, 12AM BMP Na114 3% increased to 40cc/hr, urine studies, pancx to r/o infection since pt is immunocompromised. Changed pantoprazole to sucralfate for GI ppx d/t thrombocytopenia. LE dopplers negative. DC'd 3%. ENT consulted to assess stoma, recommend follow up upon discharge with ENT, Repeat sodium 122. Restarted 3% at 30.  : ANA LILIA o/n neuro stable. remains on 3%@30cc/hr. Started florinef, increased 3% to 50cc/hr. Increased 3% to 75cc/hr.    7/10: continued current 3% rate o/n. Na 127 --> 125, cont 3% @75cc/hr, f/u repeat Na this evening, likely stepdown tomorrow. Pend dispo home with home PT/OT when able. Heme consulted for thrombocytopenia  : ANA LILIA o/n. Remains on 3% @75cc/hr. Decreased 3% to 50cc/hr. Started 1.2L fluid restriction. Repeat Na corrected 128  : ANA LILIA overnight, remains on 3%, SDU from ICU  : ANA LILIA overnight, neuro stable, on 3%@50, Am sodium 137, decreased 3% to 25cc/hr, repeat Na 138, decreased to 15cc/hr.  : ANA LILIA overnight, neuro stable, remains on 3%@15cc/hr  7/15: ANA LILIA overnight. Neuro exam stable. Pt remains on 3% saline.Sodium 134 this AM remains on 3%@25. Per nephrology recs hypertonic Na d'ceed, Na tabs 2q6, URENA 15g BID, cont florinef/ fluid restriction.Per nephrology Na >130 ok when ready for d/c   : ANA LILIA overnight, hypertonics d/c now on urea powder/1L fluid restriction/florinef and salt tabs per renal, hydrocortisone cream ordered for rash on back, rec for Home PT  : ANA LILIA overnight, following Na, renal following, ENT saw for hoarse voice rec followup with CT surgery for possible dilation, pending Home PT. Patient developed sudden onset severe headache. Hyperventilating with increased work of breathing. Wheezing in all lung fields to auscultation. Neuro intact on exam. Duoneb x1 ordered. Dilaudid 0.25 IV given for pain control. Subsequently patient developed nausea with multiple episodes of vomiting. Hypertensive with SBP in the 190s. Given hydralazine 10mg IVP, Zofran. Stroke code and rapid response called. STAT labs sent. CTH/CTA/CTP completed. Tachycardic to the 180s, consistent with SVT, remained hypertensive. Given 10 of labetalol, SVT broke. Transferred to Telemetry. WBC 23.43, lactate 8.5. Given 1L fluid bolus. Pan culture sent. Started empirically on Vanc/Meropenem.   : Put on eeg. Voiding while retaining urine, SC for 500cc. EEG +spikes, epilepsy consulted. CTA chest and CT A/P pending. Keppra increased 1g BID.   : Cont EEG, trend Na, possible NGT today if not able to tolerate PO. Blood cx growing GS + cocci in clusters, f/u MRSA swab and pan cx, cont meropenem and vancomycin. Vanc trough in am. F/u urine studies.   : Off EEG, neuro exam improved. ID following for concern for sepsis, likely aerobic blood cx bottle staph epi contaminant, cont monitoring off of abx, possible drug reaction as cause. F/u surveillance blood cx. Hyponatremia, repeat Na 129 overnight from 130, cont current regimen and f/u am labs and nephrology recs. AM na 129 continuing fluid restriction, nephro recommending restarting urena, but holding due to possible drug reaction previously.   : neurologically stable, c/w fluid restriction, f/u AM Na, urine osm, urine Na, cortisol. Given 15mg of tolvaptan. Fluid restriction, florinef d/c'd per nephro recs. Held salt tabs for today. 6pm BMP Na 122, 10pm BMP Na 131, urine osm 141  : ANA LILIA ovn, neuro stable. AM Na 133. Restarted salt tabs 3q6 and 1L fluid restriction. Lantus 10u at bedtime added. 9:30pm BMP per nephro Na 138  : ANA LILIA ovn, neuro stable. Na stable, discussed with nephrology can cont tolvaptan 15mg daily PRN for hyponatremia, only needed for hypoNa, can discontinue fluid restriction. Pt evaluated at bedside after nurse noted patient to have some expressive aphasia and perseverating; patient oriented to self, unable to say hospital or year, following all commands and DUMONT 5/5 strength, no drift, no facial droop, perseverating on pt's  when asked other questions. Pt afebrile, vitals stable, cont keppra 1g BID, cont decadron 4mg BID, discussed with Dr. Dorado, defer CTH at this time and monitor clinically. . Lantus increased to 14u at bedtime.  : o/n PSVT 13 beats followed by sinus tachycardia in setting of anxiety and net negative fluid balance.  Resolved to 102 with verbal comfort and further resolved with 500 cc NS. Lantus increased to 18u qhs. Dr. Costa consulted.  : ANA LILIA overnight. Neuro stable.   : ANA LILIA overnight. Neuro exam stable. Dispo pending.  : multiple BMs o/n. Na stable  : ANA LILIA overnight, Na 133 f/u AM labs, pending [facility]      Vital Signs Last 24 Hrs  T(C): 36.8 (2023 21:), Max: 36.8 (2023 21:)  T(F): 98.3 (:), Max: 98.3 (2023 21:)  HR: 114 (:) (91 - 114)  BP: 101/63 (2023 21:) (101/63 - 109/66)  BP(mean): --  RR: 16 (:) (16 - 18)  SpO2: 97% (:) (96% - 97%)    Parameters below as of   Patient On (Oxygen Delivery Method): room air        I&O's Detail    2023 07:  -  2023 07:00  --------------------------------------------------------  IN:  Total IN: 0 mL    OUT:    Voided (mL): 200 mL  Total OUT: 200 mL    Total NET: -200 mL      2023 07:  -  2023 01:19  --------------------------------------------------------  IN:    Oral Fluid: 800 mL  Total IN: 800 mL    OUT:    Voided (mL): 1000 mL  Total OUT: 1000 mL    Total NET: -200 mL        I&O's Summary    2023 07:  -  2023 07:00  --------------------------------------------------------  IN: 0 mL / OUT: 200 mL / NET: -200 mL    :  -  2023 01:19  --------------------------------------------------------  IN: 800 mL / OUT: 1000 mL / NET: -200 mL        PHYSICAL EXAM:  General: Patient laying in bed. A&Ox2 (places with choices) on Room Air.   HEENT: 4mm pupils equal and reactive to light bilaterally, EOMs intact bilaterally.  Cardiovascular: Clear S1 and S2 without murmurs, gallops or rubs auscultated.  Respiratory: Clear to ausculation bilaterally without wheezing, rhonchi or rales.  GI: Soft, nontender, nondistended. Positive bowel sounds in all four quadrants.  Neuro: CN II - XII grossly intact. Mild expressive aphasia. Strength 5/5 upper and lower extremities bilaterally. Sensation intact to light touch. Opens eyes spontaneously. Following commands.   Extremities: 2+ Dorsalis Pedis and Posterior Tibial pulses bilaterally.    TUBES/LINES:  [] CVC  [] A-line  [] Lumbar Drain  [] Ventriculostomy  [] Other    DIET:  [] NPO  [] Mechanical  [] Tube feeds    LABS:                        9.5    3.96  )-----------( 88       ( 2023 05:30 )             28.3     07-    133<L>  |  101  |  18  ----------------------------<  120<H>  4.0   |  23  |  0.54    Ca    7.2<L>      2023 05:30  Phos  3.5       Mg     1.7             Urinalysis Basic - ( 2023 05:30 )    Color: x / Appearance: x / SG: x / pH: x  Gluc: 120 mg/dL / Ketone: x  / Bili: x / Urobili: x   Blood: x / Protein: x / Nitrite: x   Leuk Esterase: x / RBC: x / WBC x   Sq Epi: x / Non Sq Epi: x / Bacteria: x          CAPILLARY BLOOD GLUCOSE      POCT Blood Glucose.: 144 mg/dL (2023 21:46)  POCT Blood Glucose.: 122 mg/dL (2023 17:31)  POCT Blood Glucose.: 109 mg/dL (2023 12:18)  POCT Blood Glucose.: 105 mg/dL (2023 06:25)      Drug Levels: [] N/A    CSF Analysis: [] N/A      Allergies    amoxicillin (Rash)    Intolerances      MEDICATIONS:  Antibiotics:    Neuro:  acetaminophen     Tablet .. 650 milliGRAM(s) Oral every 6 hours PRN  escitalopram 10 milliGRAM(s) Oral daily  levETIRAcetam 1000 milliGRAM(s) Oral two times a day  melatonin 5 milliGRAM(s) Oral at bedtime  ondansetron Injectable 4 milliGRAM(s) IV Push every 6 hours PRN    Anticoagulation:    OTHER:  atorvastatin 20 milliGRAM(s) Oral at bedtime  benzocaine/menthol Lozenge 1 Lozenge Oral every 4 hours PRN  dexAMETHasone     Tablet 4 milliGRAM(s) Oral every 12 hours  fluticasone propionate 50 MICROgram(s)/spray Nasal Spray 1 Spray(s) Both Nostrils two times a day  insulin glargine Injectable (LANTUS) 18 Unit(s) SubCutaneous at bedtime  insulin lispro (ADMELOG) corrective regimen sliding scale   SubCutaneous Before meals and at bedtime  psyllium Powder 1 Packet(s) Oral two times a day  sucralfate 1 Gram(s) Oral every 12 hours    IVF:  sodium chloride 3 Gram(s) Oral every 6 hours    CULTURES:  Culture Results:   No growth at 5 days. ( @ 18:36)  Culture Results:   Growth in aerobic and anaerobic bottles: Staphylococcus epidermidis ( @ 18:48)    RADIOLOGY & ADDITIONAL TESTS:      ASSESSMENT:  Pt is a 67YO male with a past medical history of type 2 diabetes, hyperlipidemia, iron deficiency anemia, tracheal stenosis s/p tracheostomy (which was closed by ENT 7 days ago); glioblastoma s/p resection 2022, and Avastin treatment 3/2023 presenting with expressive aphasia, nuasea and vomiting x1 day. CT shows new hyperdensity in left frontal parietal - surgical site c/f disease progression. Also hyponatremic to 118. Admitted for Na management. Hospital course complicated by concern for possible sepsis, ID following now off abx, hemodynamically stable.     ALTERED MENTAL STATUS;BRAIN MASS    Abnormal electrocardiogram [ECG] [EKG]    Allergy, unspecified, initial encounter    Anemia, unspecified    Benign prostatic hyperplasia without lower urinary tract symptoms    Calculus of kidney    Cough, unspecified    Depression, unspecified    ENCOUNTER FOR ATTENT    Encounter for general adult medical examination without abnormal findings    Encounter for immunization    Encounter for immunization safety counseling    Encounter for other preprocedural examination    Encounter for screening for malignant neoplasm of prostate    Encounter for screening for other metabolic disorders    Encounter for screening for other viral diseases    ESSENTIAL (PRIMARY)    Gastro-esophageal reflux disease without esophagitis    Generalized anxiety disorder    History of Glioblastoma    Hyperlipidemia, unspecified    Hypo-osmolality and hyponatremia    Hypotension, unspecified    Iron deficiency    Iron deficiency anemia, unspecified    LONG TERM (CURRENT)    LONG TERM (CURRENT)    LONG TERM (CURRENT)    Malignant neoplasm of brain, unspecified    Nausea with vomiting, unspecified    Other constipation    Other fatigue    Other injury of unspecified body region, initial encounter    OTHER SPECIFIED DISE    Personal history of malignant neoplasm, unspecified    Personal history of other diseases of the nervous system and sense organs    Personal history of other endocrine, nutritional and metabolic disease    Personal history of other mental and behavioral disorders    Personal history of transient ischemic attack (TIA), and cerebral infarction without residual deficits    POSTPROCEDURAL SUBGL    Pruritus, unspecified    Shortness of breath    Tachycardia, unspecified    Thrombocytopenia, unspecified    Unspecified abdominal pain    Unspecified visual disturbance    Urinary calculus, unspecified    Vitamin D deficiency, unspecified    No pertinent family history in first degree relatives    FH: diabetes mellitus (Father, Mother)    FH: hyperlipidemia (Father)    FH: hypertension (Father, Mother)    Handoff    MEWS Score    No pertinent past medical history    Diabetes mellitus    Hypertension    Hyponatremia    Encounter for palliative care    Advance care planning    Functional quadriplegia    At risk for altered mental status    Altered mental status    Debility    Moderate protein-calorie malnutrition    No significant past surgical history    No significant past surgical history    S/P craniotomy    H/O tracheostomy    No significant past surgical history    MED EVAL    Type 2 diabetes mellitus          PLAN:  Neuro:  -neuro/vitals q4hrs   -CTH : Interval development of a 13 mm hyperdense nodule along the inferior margin of the resection cavity which may represent progression of disease with hemorrhage. Repeat CTH stable, CTH  stable   -CTA : negative, CTA  stable  -CTP  stable.   -pain control prn  -Keppra increased to 1g BID 2023 per epilepsy recs for sharp waves seen on EEG  -Decadron 4mg BID  -c/w home Lexapro 5mg  -home ASA 81 held i/s/o thrombocytopenia   -MRI brain suspicious for recurrent tumor and hydro   -palliative following     Pulm:  -satting well on RA    Cardio:  --160    GI:  -CCD   -bowel regimen held for loose stools  -sucralfate for ulcer ppx while on steroids  -last BM     Renal:  - Hyponatremic (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  - Trend BMP  - off NS for pulm edema on CXR; off hypertonics since 7/15  - salt tabs 3q6   - s/p tolvaptan 15mg   - May need to administer D5W if patient overcorrects  - voiding, sc prn   - dc'd urea powder 15g BID started per Renal d/t petechial rash     Endo:  -ISS while on Decadron  -A1c 5.7  -lantus 18u at bedtime   -h/o T2DM: home Januvia held  -h/o HLD: c/w Atorvastatin 20mg    Heme:  -SCDs for DVT ppx, SQL and Aspirin 81 held for thrombocytopenia   -heme consulted for thrombocytopenia- w/u sent, likely chronic thrombocytopenia d/t chemotherapy agent in past   -Per heme transfuse 1U plt if bleeding and platelet count <50k, if febrile and platelet count <20k  -LE dopplers  negative  -CTA PE protocol for tachycardia: negative     ID:  -pan cx ; aerobic bottles growing GS+ cocci in clusters   -Febrile, leukocytosis and tachycardia with elevated lactic acid on   -vancomycin/meropenem empirically (-), d/c per ID and repeat blood cx     Dispo: regional status, full code, AR    D/w Dr. Dorado      Assessment:  Present when checked    []  GCS  E   V  M     Heart Failure: []Acute, [] acute on chronic , []chronic  Heart Failure:  [] Diastolic (HFpEF), [] Systolic (HFrEF), []Combined (HFpEF and HFrEF), [] RHF, [] Pulm HTN, [] Other    [] EUGENIA, [] ATN, [] AIN, [] other  [] CKD1, [] CKD2, [] CKD 3, [] CKD 4, [] CKD 5, []ESRD    Encephalopathy: [] Metabolic, [] Hepatic, [] toxic, [] Neurological, [] Other    Abnormal Nurtitional Status: [] malnurtition (see nutrition note), [ ]underweight: BMI < 19, [] morbid obesity: BMI >40, [] Cachexia    [] Sepsis  [] hypovolemic shock,[] cardiogenic shock, [] hemorrhagic shock, [] neuogenic shock  [] Acute Respiratory Failure  []Cerebral edema, [] Brain compression/ herniation,   [] Functional quadriplegia  [] Acute blood loss anemia

## 2023-07-28 NOTE — PROGRESS NOTE ADULT - SUBJECTIVE AND OBJECTIVE BOX
Patient is a 68y old  Male who presents with a chief complaint of Altered Mental Status & Weakness x2 days (28 Jul 2023 01:19)      SUBJECTIVE:  Patient seen and examined at bedside.  Slightly lethargic, complaint of some nausea.  Full body ache without localization.  Family at bedside updated with  [id number].  Last BM yesterday    Patient seen again in afternoon due  to worsening lethargy, urine darkening and tachycardia.  Episode of vomiting with bile per family at bedside    ROS:  Denies fevers, chills, headache, vision changes, neck pain, cough, SOB, chest pain.  All other ROS negative except as above    Vital Signs Last 12 Hrs  T(F): 100.9 (07-28-23 @ 16:14), Max: 100.9 (07-28-23 @ 16:14)  HR: 120 (07-28-23 @ 16:14) (97 - 120)  BP: 101/61 (07-28-23 @ 16:14) (100/62 - 108/60)  BP(mean): --  RR: 16 (07-28-23 @ 16:14) (16 - 18)  SpO2: 97% (07-28-23 @ 16:14) (95% - 97%)  I&O's Summary    27 Jul 2023 07:01  -  28 Jul 2023 07:00  --------------------------------------------------------  IN: 800 mL / OUT: 1000 mL / NET: -200 mL        PHYSICAL EXAM:  Constitutional: lethargic  HEENT: PERRLA, EOMI, MMM  Neck: Supple  Respiratory: CTA B/L. No w/r/r.   Cardiovascular: Tachycardic regular rhythm, normal S1 and S2, no m/r/g.   Gastrointestinal: mild abdominal distension and tenderness to palpation  Extremities: wwp; no cyanosis, clubbing or edema.   Vascular: Pulses equal and strong throughout.   Neurological: AAOx3, no focal deficits  Skin: No gross skin abnormalities or rashes        LABS:                        8.9    4.87  )-----------( 68       ( 28 Jul 2023 07:27 )             27.9     07-28    132<L>  |  101  |  21  ----------------------------<  172<H>  4.4   |  21<L>  |  0.60    Ca    7.1<L>      28 Jul 2023 07:27  Phos  3.3     07-28  Mg     1.6     07-28        Urinalysis Basic - ( 28 Jul 2023 16:16 )    Color: Yellow / Appearance: Clear / SG: >=1.030 / pH: x  Gluc: x / Ketone: 15 mg/dL  / Bili: Moderate / Urobili: 4.0 E.U./dL   Blood: x / Protein: 30 mg/dL / Nitrite: POSITIVE   Leuk Esterase: NEGATIVE / RBC: < 5 /HPF / WBC < 5 /HPF   Sq Epi: x / Non Sq Epi: x / Bacteria: Present /HPF          RADIOLOGY & ADDITIONAL TESTS:  No new imaging    MEDICATIONS  (STANDING):  atorvastatin 20 milliGRAM(s) Oral at bedtime  dexAMETHasone     Tablet 4 milliGRAM(s) Oral every 12 hours  escitalopram 10 milliGRAM(s) Oral daily  fluticasone propionate 50 MICROgram(s)/spray Nasal Spray 1 Spray(s) Both Nostrils two times a day  insulin glargine Injectable (LANTUS) 18 Unit(s) SubCutaneous at bedtime  insulin lispro (ADMELOG) corrective regimen sliding scale   SubCutaneous Before meals and at bedtime  levETIRAcetam 1000 milliGRAM(s) Oral two times a day  melatonin 5 milliGRAM(s) Oral at bedtime  psyllium Powder 1 Packet(s) Oral two times a day  sodium chloride 3 Gram(s) Oral every 6 hours  sucralfate 1 Gram(s) Oral every 12 hours    MEDICATIONS  (PRN):  acetaminophen     Tablet .. 650 milliGRAM(s) Oral every 6 hours PRN Temp greater or equal to 38C (100.4F), Mild Pain (1 - 3)  benzocaine/menthol Lozenge 1 Lozenge Oral every 4 hours PRN Sore Throat  marijuana, medical 2 Capsule(s) Oral every 6 hours PRN Nausea  ondansetron Injectable 4 milliGRAM(s) IV Push every 6 hours PRN Nausea and/or Vomiting

## 2023-07-28 NOTE — CHART NOTE - NSCHARTNOTEFT_GEN_A_CORE
ANA LILIA overnight, Na 133 f/u AM labs, pending [place]. Pt has episode of sustained tachycardia, c/o feeling warm, and dizzy while working with Pt. Given 500cc NS bolus, rectal temp was 99F. Family brought in medical marijuana. Pt had rectal temp 100.9, pancultured. Given another 1L bolus and started on maintenance fluids.

## 2023-07-29 NOTE — CONSULT NOTE ADULT - SUBJECTIVE AND OBJECTIVE BOX
HPI obtained through chart review as pt intubated/sedated. Unable to ROS to     69YO male with a past medical history of type 2 diabetes tracheal stenosis s/p tracheostomy (which now closed by ENT) and glioblastoma s/p resection 1/27/2022, and Avastin treatment 3/2023 who initially presened with altered mental status and weakness.    Stroke code initially called with imaging negative for CVA.    Found to have Na 115 in ED with repeat 118, started 3% at 30cc/hr, and salt tabs 3q6, stability CTH in ED showing Interval development of a 13 mm hyperdense nodule along the inferior margin of the resection cavity which may represent progression   of disease with hemorrhage, repeat CTH stable.  Hypertonic saline continued through 7/16 and transitioned to fluid restriction    On 7/17, he developed sudden onset severe headache. Hyperventilating with increased work of breathing. Wheezing in all lung fields to auscultation. Neuro intact on exam.  Subsequently patient developed nausea with multiple episodes of vomiting. Hypertensive with SBP in the 190s and tachycardic. Given hydralazine 10mg IVP, Zofran. Stroke code and rapid response called. STAT labs sent. CTH/CTA/CTP completed. WBCs 23.43, lactate 8.5. Given 1L fluid bolus. Pan culture sent. Started empirically on Vanc/Meropenem.     Blood cx found to be growing GS + cocci in clusters, f/u MRSA swab and pan cx, cont meropenem and vancomycin. Per ID following for concern for sepsis,blod cx thought to be staph epi contaminant,    On 7/28 he had an episode of sustained tachycardia, c/o feeling warm, and dizzy while working with Pt. Given 500cc NS bolus, rectal temp was 100.9, pancultured.  Blood cx growing GNR. ID consulted. Given another 1L bolus and started on maintenance fluids. Continued fever was noted and he was started empirically on vanc/cipro/flagyl. Upgraded to telemetry.    On AM of 7/29 rapid response called at 06:12AM for c/f aspiration FS BGL 37; initial VS: T 99.7F oral, HR 97, BP 81/47, RR 28 mildly labored and shallow, SpO2 87% on 6L NC (applied for this event). Per witnessing RN pt reportedly had an episode of dark brown bilious vomiting w/ likely aspiration which prompted the rapid response.  Ventilation was assisted via BVM on 15L O2 which was well tolerated w/ SpO2 improving to 97%. Pt given 1 amp D50 w/ repeat FS . NS 1L IV bolus administered w/ improvement in BP to 104/67, HR 87. Physical exam significant for diffuse rhonchi R>L. Pt deemed unable to protect his airway and decision was made by primary team to intubate, at which time Anesthesia STAT was called overhead and pt was transferred to [facility] Neuro ICU for further management. Induction w/ total of Propofol 70mg IVP and Succinylcholine 40mg IVP which was successful but c/b multiple further episodes of dark brown bilious, non-bloody vomiting requiring suctioning. VS after intubation: HR 86, /68, RR 14 (ventilated), SpO2 99% on 100% FiO2.    Hgb noted to be downtrending and GI consulted given concern for GIB         (07 Jul 2023 13:10)    Allergies    amoxicillin (Rash)    Intolerances      Home Medications:  acetaminophen 325 mg oral tablet: 2 tab(s) orally every 6 hours As needed Temp greater or equal to 38C (100.4F), Mild Pain (1 - 3) (17 Jul 2023 11:18)  fluticasone 50 mcg/inh nasal spray: 1 spray(s) nasal 2 times a day (18 Apr 2023 13:38)  hydrocortisone 1% topical cream: 1 Apply topically to affected area once a day (17 Jul 2023 11:18)  melatonin 3 mg oral tablet: 2 tab(s) orally once a day (at bedtime) (18 Apr 2023 13:38)  senna (sennosides) 8.6 mg oral tablet: 1 tab(s) orally once a day as needed for  constipation (18 Apr 2023 13:40)    MEDICATIONS:  MEDICATIONS  (STANDING):  atorvastatin 20 milliGRAM(s) Oral at bedtime  chlorhexidine 0.12% Liquid 15 milliLiter(s) Oral Mucosa every 12 hours  dexAMETHasone  Injectable 2 milliGRAM(s) IV Push every 12 hours  dexMEDEtomidine Infusion 0.2 MICROgram(s)/kG/Hr (3.63 mL/Hr) IV Continuous <Continuous>  escitalopram 5 milliGRAM(s) Oral daily  fluticasone propionate 50 MICROgram(s)/spray Nasal Spray 1 Spray(s) Both Nostrils two times a day  insulin lispro (ADMELOG) corrective regimen sliding scale   SubCutaneous Before meals and at bedtime  lacosamide IVPB 50 milliGRAM(s) IV Intermittent every 12 hours  meropenem  IVPB 1000 milliGRAM(s) IV Intermittent every 8 hours  norepinephrine Infusion 0.05 MICROgram(s)/kG/Min (3.4 mL/Hr) IV Continuous <Continuous>  pantoprazole  Injectable 40 milliGRAM(s) IV Push every 12 hours  propofol Infusion 20 MICROgram(s)/kG/Min (8.71 mL/Hr) IV Continuous <Continuous>  propofol Infusion 5 MICROgram(s)/kG/Min (2.18 mL/Hr) IV Continuous <Continuous>  psyllium Powder 1 Packet(s) Oral two times a day  sodium chloride 3 Gram(s) Oral every 6 hours  sodium chloride 0.9%. 1000 milliLiter(s) (75 mL/Hr) IV Continuous <Continuous>  sucralfate 1 Gram(s) Oral every 6 hours    MEDICATIONS  (PRN):  acetaminophen     Tablet .. 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      Type 2 diabetes mellitus      Hyperlipidemia      Iron deficiency anemia      Nephrolithiasis      Thrombocytopenia      Hyponatremia      BPH (benign prostatic hyperplasia)      S/P craniotomy      H/O tracheostomy        FAMILY HISTORY:  FH: diabetes mellitus (Father, Mother)    FH: hyperlipidemia (Father)    FH: hypertension (Father, Mother)      REVIEW OF SYSTEMS:  Unable to obtain    Vital Signs Last 24 Hrs  T(C): 38.9 (29 Jul 2023 19:00), Max: 39 (29 Jul 2023 18:00)  T(F): 102 (29 Jul 2023 19:00), Max: 102.2 (29 Jul 2023 18:00)  HR: 98 (29 Jul 2023 19:00) (88 - 133)  BP: 117/56 (29 Jul 2023 19:00) (69/42 - 168/67)  BP(mean): 80 (29 Jul 2023 19:00) (50 - 108)  RR: 18 (29 Jul 2023 19:00) (15 - 23)  SpO2: 98% (29 Jul 2023 19:00) (94% - 100%)    Parameters below as of 29 Jul 2023 19:00  Patient On (Oxygen Delivery Method): ventilator, VC-AC    O2 Concentration (%): 40    07-28 @ 07:01  -  07-29 @ 07:00  --------------------------------------------------------  IN: 2609 mL / OUT: 1150 mL / NET: 1459 mL    07-29 @ 07:01  -  07-29 @ 19:28  --------------------------------------------------------  IN: 2842.5 mL / OUT: 2220 mL / NET: 622.5 mL      Mode: AC/ CMV (Assist Control/ Continuous Mandatory Ventilation)  RR (machine): 14  TV (machine): 400  FiO2: 45  PEEP: 5  ITime: 1  MAP: 7  PIP: 10    PHYSICAL EXAM:    General: Intubated/sedated.   Neck: Trachea midline, supple  HEENT: OG tube in place with coffee ground appearing output  Lungs: Mechanical ventilation  Cardiovascular: Tachycardic on tele  Abdomen: Soft non distended  Rectal exam: Light brown stool no gross blood  Campuzano: Dark urine    LABS:                        8.0    6.50  )-----------( 88       ( 29 Jul 2023 15:01 )             23.3     07-29    136  |  112<H>  |  23  ----------------------------<  133<H>  4.1   |  16<L>  |  0.67    Ca    6.7<L>      29 Jul 2023 15:10  Phos  3.0     07-29  Mg     1.8     07-29    TPro  x   /  Alb  x   /  TBili  x   /  DBili  1.1<H>  /  AST  x   /  ALT  x   /  AlkPhos  x   07-29        PT/INR - ( 29 Jul 2023 15:34 )   PT: 14.3 sec;   INR: 1.26          PTT - ( 29 Jul 2023 15:34 )  PTT:27.4 sec    Culture - Blood (collected 28 Jul 2023 17:01)  Source: .Blood Blood-Peripheral  Preliminary Report (29 Jul 2023 18:00):    No growth at 1 day.    Culture - Blood (collected 28 Jul 2023 17:01)  Source: .Blood Blood-Venous  Gram Stain (29 Jul 2023 06:23):    Growth in anaerobic bottle: Gram Negative Rods    Result called to and read back byBAO WEIR RN  07/29/2023    06:23:39    ***Blood Panel PCR results on this specimen are available    approximately 3 hours after the Gram stain result.***    Gram stain, PCR, and/or culture results may not always    correspond due to difference in methodologies.    ************************************************************    This PCR assay was performed using [employer] BCID2 panel.    This assay tests for bacterial, fungal and resistance gene    targets.  Preliminary Report (29 Jul 2023 06:24):    Culture in progress  Organism: Blood Culture PCR (29 Jul 2023 07:11)  Organism: Blood Culture PCR (29 Jul 2023 07:11)    Urinalysis with Rflx Culture (collected 28 Jul 2023 16:16)      RADIOLOGY & ADDITIONAL STUDIES:      HPI obtained through chart review as pt intubated/sedated. Unable to ROS to     67YO male with a past medical history of type 2 diabetes tracheal stenosis s/p tracheostomy (which now closed by ENT) and glioblastoma s/p resection 1/27/2022, and Avastin treatment 3/2023 who initially prestened with altered mental status and weakness.    Stroke code initially called with imaging negative for CVA.    Found to have Na 115 in ED with repeat 118, started 3% at 30cc/hr, and salt tabs 3q6, stability CTH in ED showing Interval development of a 13 mm hyperdense nodule along the inferior margin of the resection cavity which may represent progression   of disease with hemorrhage, repeat CTH stable.  Hypertonic saline continued through 7/16 and transitioned to fluid restriction    On 7/17, he developed sudden onset severe headache. Hyperventilating with increased work of breathing. Wheezing in all lung fields to auscultation. Neuro intact on exam.  Subsequently patient developed nausea with multiple episodes of vomiting. Hypertensive with SBP in the 190s and tachycardic. Given hydralazine 10mg IVP, Zofran. Stroke code and rapid response called. STAT labs sent. CTH/CTA/CTP completed. WBCs 23.43, lactate 8.5. Given 1L fluid bolus. Pan culture sent. Started empirically on Vanc/Meropenem.     Blood cx found to be growing GS + cocci in clusters, f/u MRSA swab and pan cx, cont meropenem and vancomycin. Per ID following for concern for sepsis,blod cx thought to be staph epi contaminant,    On 7/28 he had an episode of sustained tachycardia, c/o feeling warm, and dizzy while working with Pt. Given 500cc NS bolus, rectal temp was 100.9, pancultured.  Blood cx growing GNR. ID consulted. Given another 1L bolus and started on maintenance fluids. Continued fever was noted and he was started empirically on vanc/cipro/flagyl. Upgraded to telemetry.    On AM of 7/29 rapid response called at 06:12AM for c/f aspiration FS BGL 37; initial VS: T 99.7F oral, HR 97, BP 81/47, RR 28 mildly labored and shallow, SpO2 87% on 6L NC (applied for this event). Per witnessing RN pt reportedly had an episode of dark brown bilious vomiting w/ likely aspiration which prompted the rapid response.  Ventilation was assisted via BVM on 15L O2 which was well tolerated w/ SpO2 improving to 97%. Pt given 1 amp D50 w/ repeat FS . NS 1L IV bolus administered w/ improvement in BP to 104/67, HR 87. Physical exam significant for diffuse rhonchi R>L. Pt deemed unable to protect his airway and decision was made by primary team to intubate, at which time Anesthesia STAT was called overhead and pt was transferred to [facility] Neuro ICU for further management. Induction w/ total of Propofol 70mg IVP and Succinylcholine 40mg IVP which was successful but c/b multiple further episodes of dark brown bilious, non-bloody vomiting requiring suctioning. VS after intubation: HR 86, /68, RR 14 (ventilated), SpO2 99% on 100% FiO2.    Hgb noted to be downtrending and GI consulted given concern for GIB         (07 Jul 2023 13:10)    Allergies    amoxicillin (Rash)    Intolerances      Home Medications:  acetaminophen 325 mg oral tablet: 2 tab(s) orally every 6 hours As needed Temp greater or equal to 38C (100.4F), Mild Pain (1 - 3) (17 Jul 2023 11:18)  fluticasone 50 mcg/inh nasal spray: 1 spray(s) nasal 2 times a day (18 Apr 2023 13:38)  hydrocortisone 1% topical cream: 1 Apply topically to affected area once a day (17 Jul 2023 11:18)  melatonin 3 mg oral tablet: 2 tab(s) orally once a day (at bedtime) (18 Apr 2023 13:38)  senna (sennosides) 8.6 mg oral tablet: 1 tab(s) orally once a day as needed for  constipation (18 Apr 2023 13:40)    MEDICATIONS:  MEDICATIONS  (STANDING):  atorvastatin 20 milliGRAM(s) Oral at bedtime  chlorhexidine 0.12% Liquid 15 milliLiter(s) Oral Mucosa every 12 hours  dexAMETHasone  Injectable 2 milliGRAM(s) IV Push every 12 hours  dexMEDEtomidine Infusion 0.2 MICROgram(s)/kG/Hr (3.63 mL/Hr) IV Continuous <Continuous>  escitalopram 5 milliGRAM(s) Oral daily  fluticasone propionate 50 MICROgram(s)/spray Nasal Spray 1 Spray(s) Both Nostrils two times a day  insulin lispro (ADMELOG) corrective regimen sliding scale   SubCutaneous Before meals and at bedtime  lacosamide IVPB 50 milliGRAM(s) IV Intermittent every 12 hours  meropenem  IVPB 1000 milliGRAM(s) IV Intermittent every 8 hours  norepinephrine Infusion 0.05 MICROgram(s)/kG/Min (3.4 mL/Hr) IV Continuous <Continuous>  pantoprazole  Injectable 40 milliGRAM(s) IV Push every 12 hours  propofol Infusion 20 MICROgram(s)/kG/Min (8.71 mL/Hr) IV Continuous <Continuous>  propofol Infusion 5 MICROgram(s)/kG/Min (2.18 mL/Hr) IV Continuous <Continuous>  psyllium Powder 1 Packet(s) Oral two times a day  sodium chloride 3 Gram(s) Oral every 6 hours  sodium chloride 0.9%. 1000 milliLiter(s) (75 mL/Hr) IV Continuous <Continuous>  sucralfate 1 Gram(s) Oral every 6 hours    MEDICATIONS  (PRN):  acetaminophen     Tablet .. 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      Type 2 diabetes mellitus      Hyperlipidemia      Iron deficiency anemia      Nephrolithiasis      Thrombocytopenia      Hyponatremia      BPH (benign prostatic hyperplasia)      S/P craniotomy      H/O tracheostomy        FAMILY HISTORY:  FH: diabetes mellitus (Father, Mother)    FH: hyperlipidemia (Father)    FH: hypertension (Father, Mother)      REVIEW OF SYSTEMS:  Unable to obtain    Vital Signs Last 24 Hrs  T(C): 38.9 (29 Jul 2023 19:00), Max: 39 (29 Jul 2023 18:00)  T(F): 102 (29 Jul 2023 19:00), Max: 102.2 (29 Jul 2023 18:00)  HR: 98 (29 Jul 2023 19:00) (88 - 133)  BP: 117/56 (29 Jul 2023 19:00) (69/42 - 168/67)  BP(mean): 80 (29 Jul 2023 19:00) (50 - 108)  RR: 18 (29 Jul 2023 19:00) (15 - 23)  SpO2: 98% (29 Jul 2023 19:00) (94% - 100%)    Parameters below as of 29 Jul 2023 19:00  Patient On (Oxygen Delivery Method): ventilator, VC-AC    O2 Concentration (%): 40    07-28 @ 07:01  -  07-29 @ 07:00  --------------------------------------------------------  IN: 2609 mL / OUT: 1150 mL / NET: 1459 mL    07-29 @ 07:01  -  07-29 @ 19:28  --------------------------------------------------------  IN: 2842.5 mL / OUT: 2220 mL / NET: 622.5 mL      Mode: AC/ CMV (Assist Control/ Continuous Mandatory Ventilation)  RR (machine): 14  TV (machine): 400  FiO2: 45  PEEP: 5  ITime: 1  MAP: 7  PIP: 10    PHYSICAL EXAM:    General: Intubated/sedated.   Neck: Trachea midline, supple  HEENT: OG tube in place with coffee ground appearing output  Lungs: Mechanical ventilation  Cardiovascular: Tachycardic on tele  Abdomen: Soft non distended  Rectal exam: Light brown stool no gross blood  Campuzano: Dark urine    LABS:                        8.0    6.50  )-----------( 88       ( 29 Jul 2023 15:01 )             23.3     07-29    136  |  112<H>  |  23  ----------------------------<  133<H>  4.1   |  16<L>  |  0.67    Ca    6.7<L>      29 Jul 2023 15:10  Phos  3.0     07-29  Mg     1.8     07-29    TPro  x   /  Alb  x   /  TBili  x   /  DBili  1.1<H>  /  AST  x   /  ALT  x   /  AlkPhos  x   07-29        PT/INR - ( 29 Jul 2023 15:34 )   PT: 14.3 sec;   INR: 1.26          PTT - ( 29 Jul 2023 15:34 )  PTT:27.4 sec    Culture - Blood (collected 28 Jul 2023 17:01)  Source: .Blood Blood-Peripheral  Preliminary Report (29 Jul 2023 18:00):    No growth at 1 day.    Culture - Blood (collected 28 Jul 2023 17:01)  Source: .Blood Blood-Venous  Gram Stain (29 Jul 2023 06:23):    Growth in anaerobic bottle: Gram Negative Rods    Result called to and read back byBAO WEIR RN  07/29/2023    06:23:39    ***Blood Panel PCR results on this specimen are available    approximately 3 hours after the Gram stain result.***    Gram stain, PCR, and/or culture results may not always    correspond due to difference in methodologies.    ************************************************************    This PCR assay was performed using [employer] BCID2 panel.    This assay tests for bacterial, fungal and resistance gene    targets.  Preliminary Report (29 Jul 2023 06:24):    Culture in progress  Organism: Blood Culture PCR (29 Jul 2023 07:11)  Organism: Blood Culture PCR (29 Jul 2023 07:11)    Urinalysis with Rflx Culture (collected 28 Jul 2023 16:16)      RADIOLOGY & ADDITIONAL STUDIES:

## 2023-07-29 NOTE — PROGRESS NOTE ADULT - SUBJECTIVE AND OBJECTIVE BOX
HPI:  Osiel Norwood is a 67YO male with a past medical history of type 2 diabetes, hyperlipidemia, iron deficiency anemia, tracheal stenosis s/p tracheostomy (which was closed by ENT 7 days ago); glioblastoma s/p resection 2022, and Avastin treatment 3/2023 presenting with altered mental status and weakness x2 days. Per family, they last saw him last night around 6pm when he was at baseline. Patient has intermittent expressive aphasia but is getting worse and now is persistent, and has intermittent nausea and vomiting since last night. Patient was supposed to receive an outpatient brain MRI on 2023.  Stroke code performed in  is negative for CVA. Head CT shows new hyperdensity in frontal parietal - surgical site. MRI is ordered to rule out tumor recurrence.   Per daughter at bedside, Entresto and Eliquis were stopped per his PC - cardiologist.      (2023 13:10)    OVERNIGHT EVENTS: ANA LILIA overnight. Neuro stable.    Hospital Course:   : Admitted. Na 115 in ED with repeat 118, started 3% at 30cc/hr, and salt tabs 3q6, stability CTH in ED showing Interval development of a 13 mm hyperdense nodule along the   inferior margin of the resection cavity which may represent progression   of disease with hemorrhage, repeat CTH stable, urine studies ordered  : Neuro stable, 12AM BMP Na114 3% increased to 40cc/hr, urine studies, pancx to r/o infection since pt is immunocompromised. Changed pantoprazole to sucralfate for GI ppx d/t thrombocytopenia. LE dopplers negative. DC'd 3%. ENT consulted to assess stoma, recommend follow up upon discharge with ENT, Repeat sodium 122. Restarted 3% at 30.  : ANA LILIA o/n neuro stable. remains on 3%@30cc/hr. Started florinef, increased 3% to 50cc/hr. Increased 3% to 75cc/hr.    7/10: continued current 3% rate o/n. Na 127 --> 125, cont 3% @75cc/hr, f/u repeat Na this evening, likely stepdown tomorrow. Pend dispo home with home PT/OT when able. Heme consulted for thrombocytopenia  : ANA LILIA o/n. Remains on 3% @75cc/hr. Decreased 3% to 50cc/hr. Started 1.2L fluid restriction. Repeat Na corrected 128  : ANA LILIA overnight, remains on 3%, SDU from ICU  : ANA LILIA overnight, neuro stable, on 3%@50, Am sodium 137, decreased 3% to 25cc/hr, repeat Na 138, decreased to 15cc/hr.  : ANA LILIA overnight, neuro stable, remains on 3%@15cc/hr  7/15: ANA LILIA overnight. Neuro exam stable. Pt remains on 3% saline.Sodium 134 this AM remains on 3%@25. Per nephrology recs hypertonic Na d'ceed, Na tabs 2q6, URENA 15g BID, cont florinef/ fluid restriction.Per nephrology Na >130 ok when ready for d/c   : ANA LILIA overnight, hypertonics d/c now on urea powder/1L fluid restriction/florinef and salt tabs per renal, hydrocortisone cream ordered for rash on back, rec for Home PT  : ANA LILIA overnight, following Na, renal following, ENT saw for hoarse voice rec followup with CT surgery for possible dilation, pending Home PT. Patient developed sudden onset severe headache. Hyperventilating with increased work of breathing. Wheezing in all lung fields to auscultation. Neuro intact on exam. Duoneb x1 ordered. Dilaudid 0.25 IV given for pain control. Subsequently patient developed nausea with multiple episodes of vomiting. Hypertensive with SBP in the 190s. Given hydralazine 10mg IVP, Zofran. Stroke code and rapid response called. STAT labs sent. CTH/CTA/CTP completed. Tachycardic to the 180s, consistent with SVT, remained hypertensive. Given 10 of labetalol, SVT broke. Transferred to Telemetry. WBC 23.43, lactate 8.5. Given 1L fluid bolus. Pan culture sent. Started empirically on Vanc/Meropenem.   : Put on eeg. Voiding while retaining urine, SC for 500cc. EEG +spikes, epilepsy consulted. CTA chest and CT A/P pending. Keppra increased 1g BID.   : Cont EEG, trend Na, possible NGT today if not able to tolerate PO. Blood cx growing GS + cocci in clusters, f/u MRSA swab and pan cx, cont meropenem and vancomycin. Vanc trough in am. F/u urine studies.   : Off EEG, neuro exam improved. ID following for concern for sepsis, likely aerobic blood cx bottle staph epi contaminant, cont monitoring off of abx, possible drug reaction as cause. F/u surveillance blood cx. Hyponatremia, repeat Na 129 overnight from 130, cont current regimen and f/u am labs and nephrology recs. AM na 129 continuing fluid restriction, nephro recommending restarting urena, but holding due to possible drug reaction previously.   : neurologically stable, c/w fluid restriction, f/u AM Na, urine osm, urine Na, cortisol. Given 15mg of tolvaptan. Fluid restriction, florinef d/c'd per nephro recs. Held salt tabs for today. 6pm BMP Na 122, 10pm BMP Na 131, urine osm 141  : ANA LILIA ovn, neuro stable. AM Na 133. Restarted salt tabs 3q6 and 1L fluid restriction. Lantus 10u at bedtime added. 9:30pm BMP per nephro Na 138  : ANA LILIA ovn, neuro stable. Na stable, discussed with nephrology can cont tolvaptan 15mg daily PRN for hyponatremia, only needed for hypoNa, can discontinue fluid restriction. Pt evaluated at bedside after nurse noted patient to have some expressive aphasia and perseverating; patient oriented to self, unable to say hospital or year, following all commands and DUMONT 5/5 strength, no drift, no facial droop, perseverating on pt's  when asked other questions. Pt afebrile, vitals stable, cont keppra 1g BID, cont decadron 4mg BID, discussed with Dr. Dorado, defer CTH at this time and monitor clinically. . Lantus increased to 14u at bedtime.  : o/n PSVT 13 beats followed by sinus tachycardia in setting of anxiety and net negative fluid balance.  Resolved to 102 with verbal comfort and further resolved with 500 cc NS. Lantus increased to 18u qhs. Dr. Costa consulted.  : ANA LILIA overnight. Neuro stable.   : ANA LILIA overnight. Neuro exam stable. Dispo pending.  : multiple BMs o/n. Na stable. Lexapro increased to 10mg daily for worsening depression. Held bowel regimen 2/2 multiple stools.   : ANA LILIA overnight, Na 133 f/u AM labs, pending [place]. Pt has episode of sustained tachycardia, c/o feeling warm, and dizzy while working with Pt. Given 500cc NS bolus, rectal temp was 99F. Family brought in medical marijuana. Pt had rectal temp 100.9, pancultured. Given another 1L bolus and started on maintenance fluids. Rectal temp 102. Started empirically on vanc/cipro/flagyl. Upgraded to telemetry.  : ANA LILIA overnight. Neuro stable. Given additional 1L bolus.     Vital Signs Last 24 Hrs  T(C): 38.4 (2023 23:56), Max: 39.2 (2023 18:45)  T(F): 101.1 (2023 23:56), Max: 102.6 (2023 18:45)  HR: 112 (2023 01:20) (97 - 133)  BP: 109/57 (2023 01:20) (100/62 - 118/75)  BP(mean): 77 (2023 01:20) (77 - 80)  RR: 22 (2023 01:20) (16 - 22)  SpO2: 99% (2023 01:20) (95% - 99%)    Parameters below as of 2023 01:20  Patient On (Oxygen Delivery Method): nasal cannula  O2 Flow (L/min): 2      I&O's Summary    2023 07:  -  2023 07:00  --------------------------------------------------------  IN: 800 mL / OUT: 1000 mL / NET: -200 mL    2023 07:01  -  2023 01:48  --------------------------------------------------------  IN: 1225 mL / OUT: 900 mL / NET: 325 mL        PHYSICAL EXAM:  General: Patient laying in bed. A&Ox2 (places with choices) on Room Air.   HEENT: 4mm pupils equal and reactive to light bilaterally, EOMs intact bilaterally.  Cardiovascular: Clear S1 and S2 without murmurs, gallops or rubs auscultated.  Respiratory: Clear to ausculation bilaterally without wheezing, rhonchi or rales.  GI: Soft, nontender, nondistended. Positive bowel sounds in all four quadrants.  Neuro: CN II - XII grossly intact. Mild expressive aphasia. Strength 5/5 upper and lower extremities bilaterally. Sensation intact to light touch. Opens eyes spontaneously. Following commands.   Extremities: 2+ Dorsalis Pedis and Posterior Tibial pulses bilaterally.    TUBES/LINES:  [] Campuzano  [] Lumbar Drain  [] Wound Drains  [] Others      DIET:  [] NPO  [x] Mechanical  [] Tube feeds    LABS:                        8.4    5.16  )-----------( 87       ( 2023 17:00 )             25.2     07-28    134<L>  |  104  |  21  ----------------------------<  217<H>  4.3   |  19<L>  |  0.68    Ca    7.2<L>      2023 17:00  Phos  3.3     07-  Mg     1.6     07-28    TPro  4.2<L>  /  Alb  2.2<L>  /  TBili  2.8<H>  /  DBili  x   /  AST  34  /  ALT  32  /  AlkPhos  56  07-28      Urinalysis Basic - ( 2023 17:00 )    Color: x / Appearance: x / SG: x / pH: x  Gluc: 217 mg/dL / Ketone: x  / Bili: x / Urobili: x   Blood: x / Protein: x / Nitrite: x   Leuk Esterase: x / RBC: x / WBC x   Sq Epi: x / Non Sq Epi: x / Bacteria: x          CAPILLARY BLOOD GLUCOSE      POCT Blood Glucose.: 229 mg/dL (2023 22:00)  POCT Blood Glucose.: 206 mg/dL (2023 17:34)  POCT Blood Glucose.: 252 mg/dL (2023 12:29)  POCT Blood Glucose.: 166 mg/dL (2023 06:52)      Drug Levels: [] N/A    CSF Analysis: [] N/A      Allergies    amoxicillin (Rash)    Intolerances      MEDICATIONS:  Antibiotics:  ciprofloxacin   IVPB 400 milliGRAM(s) IV Intermittent every 12 hours  metroNIDAZOLE  IVPB 500 milliGRAM(s) IV Intermittent every 8 hours  metroNIDAZOLE  IVPB      vancomycin  IVPB 1000 milliGRAM(s) IV Intermittent every 12 hours    Neuro:  acetaminophen     Tablet .. 650 milliGRAM(s) Oral every 6 hours PRN  escitalopram 10 milliGRAM(s) Oral daily  ibuprofen  Tablet. 400 milliGRAM(s) Oral every 6 hours PRN  levETIRAcetam 1000 milliGRAM(s) Oral two times a day  melatonin 5 milliGRAM(s) Oral at bedtime  ondansetron Injectable 4 milliGRAM(s) IV Push every 6 hours PRN    Anticoagulation:    OTHER:  atorvastatin 20 milliGRAM(s) Oral at bedtime  benzocaine/menthol Lozenge 1 Lozenge Oral every 4 hours PRN  dexAMETHasone     Tablet 4 milliGRAM(s) Oral every 12 hours  fluticasone propionate 50 MICROgram(s)/spray Nasal Spray 1 Spray(s) Both Nostrils two times a day  insulin glargine Injectable (LANTUS) 18 Unit(s) SubCutaneous at bedtime  insulin lispro (ADMELOG) corrective regimen sliding scale   SubCutaneous Before meals and at bedtime  marijuana, medical 2 Capsule(s) Oral every 6 hours PRN  psyllium Powder 1 Packet(s) Oral two times a day  sucralfate 1 Gram(s) Oral every 12 hours    IVF:  sodium chloride 3 Gram(s) Oral every 6 hours  sodium chloride 0.9%. 1000 milliLiter(s) IV Continuous <Continuous>    CULTURES:  Culture Results:   No growth at 5 days. ( @ 18:36)  Culture Results:   Growth in aerobic and anaerobic bottles: Staphylococcus epidermidis ( @ 18:48)    RADIOLOGY & ADDITIONAL TESTS:      ASSESSMENT:  Pt is a 67YO male with a past medical history of type 2 diabetes, hyperlipidemia, iron deficiency anemia, tracheal stenosis s/p tracheostomy (which was closed by ENT 7 days ago); glioblastoma s/p resection 2022, and Avastin treatment 3/2023 presenting with expressive aphasia, nuasea and vomiting x1 day. CT shows new hyperdensity in left frontal parietal - surgical site c/f disease progression. Also hyponatremic to 118. Admitted for Na management. Hospital course complicated by concern for possible sepsis, ID following now off abx, hemodynamically stable.     Plan:  Neuro:  -neuro/vitals q4hrs   -CTH : Interval development of a 13 mm hyperdense nodule along the inferior margin of the resection cavity which may represent progression of disease with hemorrhage. Repeat CTH stable, CTH  stable   -CTA : negative, CTA  stable  -CTP  stable.   -pain control prn  -Keppra increased to 1g BID 2023 per epilepsy recs for sharp waves seen on EEG  -Decadron 4mg BID  -c/w home Lexapro 10mg (home dose 5mg)  -home ASA 81 held i/s/o thrombocytopenia   -MRI brain suspicious for recurrent tumor and hydro   -palliative following     Pulm:  -satting well on RA    Cardio:  --160    GI:  -CCD   -bowel regimen held for loose stools, LBM   - sucralfate while on steroids  - THC?    Renal:  - Hyponatremic (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  - Trend BMP  - Maintenance fluids @ 75cc/hr  - salt tabs 3q6   - voiding, sc prn   - off NS for pulm edema on CXR; off hypertonics since 7/15  - s/p tolvaptan 15mg   - dc'd urea powder 15g BID started per Renal d/t petechial rash     Endo:  -ISS while on Decadron  -A1c 5.7  -lantus 18u at bedtime   -h/o T2DM: home Januvia held  -h/o HLD: c/w Atorvastatin 20mg    Heme:  -SCDs for DVT ppx, SQL and Aspirin 81 held for thrombocytopenia   -heme consulted for thrombocytopenia- w/u sent, likely chronic thrombocytopenia d/t chemotherapy agent in past   -Per heme transfuse 1U plt if bleeding and platelet count <50k, if febrile and platelet count <20k  -LE dopplers  negative  -CTA PE protocol for tachycardia: negative     ID:  - Pancultured , f/u  -vanc/cipro/flagyl started empirically   -Febrile, leukocytosis and tachycardia with elevated lactic acid on   -vancomycin/meropenem empirically (-), d/c per ID and repeat blood cx , NGTD    Dispo: SD status, full code, AR    D/w Dr. Royvar

## 2023-07-29 NOTE — CHART NOTE - NSCHARTNOTEFT_GEN_A_CORE
Called to bedside after patient had episode of emesis and was unresponsive for RN. Patient unresponsive to sternal rub. Dr. Huggins, neurointensivist at bedside. Repeat BP 60s/40s, tachycardic to 110s. Rapid response called. Levo gtt started. Upgraded to ICU. Intubated for airway protection.

## 2023-07-29 NOTE — PROGRESS NOTE ADULT - SUBJECTIVE AND OBJECTIVE BOX
PM EVENTS: no acute overnight events as of documentation time of this note.    ROS: negative except as mentioned above.    T(C): 38.9 (07-29-23 @ 19:00), Max: 39 (07-29-23 @ 18:00)  HR: 98 (07-29-23 @ 19:00) (88 - 133)  BP: 117/56 (07-29-23 @ 19:00) (69/42 - 168/67)  RR: 18 (07-29-23 @ 19:00) (15 - 23)  SpO2: 98% (07-29-23 @ 19:00) (94% - 100%)    Mode: AC/ CMV (Assist Control/ Continuous Mandatory Ventilation)  RR (machine): 14  TV (machine): 400  FiO2: 45  PEEP: 5  ITime: 1  MAP: 7  PIP: 10      I&O's Summary    28 Jul 2023 07:01  -  29 Jul 2023 07:00  --------------------------------------------------------  IN: 2609 mL / OUT: 1150 mL / NET: 1459 mL    29 Jul 2023 07:01  -  29 Jul 2023 19:12  --------------------------------------------------------  IN: 2842.5 mL / OUT: 2220 mL / NET: 622.5 mL        EXAM:       Neuro: grimaces to pain, not following commands, PERRL, gaze conjugate, withdrawals in POB throughout  Chest: nonlabored respirations, no adventitious lung sounds bilaterally, heart regular rate/rhythm, present S1/S2, no murmurs or rubs  Abdomen: nondistended, soft and nontender without peritoneal signs, normoactive bowel sounds  Extremities: no clubbing, well-perfused, no edema      ABG - ( 29 Jul 2023 16:51 )  pH, Arterial: 7.54  pH, Blood: x     /  pCO2: 19    /  pO2: 208   / HCO3: 16    / Base Excess: -3.9  /  SaO2: 99.0                                    8.0    6.50  )-----------( 88       ( 29 Jul 2023 15:01 )             23.3     07-29    136  |  112<H>  |  23  ----------------------------<  133<H>  4.1   |  16<L>  |  0.67    Ca    6.7<L>      29 Jul 2023 15:10  Phos  3.0     07-29  Mg     1.8     07-29    TPro  x   /  Alb  x   /  TBili  x   /  DBili  1.1<H>  /  AST  x   /  ALT  x   /  AlkPhos  x   07-29      MEDICATIONS  (STANDING):  atorvastatin 20 milliGRAM(s) Oral at bedtime  chlorhexidine 0.12% Liquid 15 milliLiter(s) Oral Mucosa every 12 hours  dexAMETHasone  Injectable 2 milliGRAM(s) IV Push every 12 hours  dexMEDEtomidine Infusion 0.2 MICROgram(s)/kG/Hr (3.63 mL/Hr) IV Continuous <Continuous>  escitalopram 5 milliGRAM(s) Oral daily  fluticasone propionate 50 MICROgram(s)/spray Nasal Spray 1 Spray(s) Both Nostrils two times a day  insulin lispro (ADMELOG) corrective regimen sliding scale   SubCutaneous Before meals and at bedtime  lacosamide IVPB 50 milliGRAM(s) IV Intermittent every 12 hours  meropenem  IVPB 1000 milliGRAM(s) IV Intermittent every 8 hours  norepinephrine Infusion 0.05 MICROgram(s)/kG/Min (3.4 mL/Hr) IV Continuous <Continuous>  pantoprazole  Injectable 40 milliGRAM(s) IV Push every 12 hours  propofol Infusion 5 MICROgram(s)/kG/Min (2.18 mL/Hr) IV Continuous <Continuous>  propofol Infusion 20 MICROgram(s)/kG/Min (8.71 mL/Hr) IV Continuous <Continuous>  psyllium Powder 1 Packet(s) Oral two times a day  sodium chloride 3 Gram(s) Oral every 6 hours  sodium chloride 0.9%. 1000 milliLiter(s) (75 mL/Hr) IV Continuous <Continuous>  sucralfate 1 Gram(s) Oral every 6 hours    MEDICATIONS  (PRN):  acetaminophen     Tablet .. 650 milliGRAM(s) Oral every 6 hours PRN Temp greater or equal to 38C (100.4F), Mild Pain (1 - 3)  benzocaine/menthol Lozenge 1 Lozenge Oral every 4 hours PRN Sore Throat  fentaNYL    Injectable 25 MICROGram(s) IV Push every 2 hours PRN vent dysynchrony  marijuana, medical 2 Capsule(s) Oral every 6 hours PRN Nausea  ondansetron Injectable 4 milliGRAM(s) IV Push every 6 hours PRN Nausea and/or Vomiting      Please see the day's note documented by Dr. Lerma for detailed ongoing assessment and plan.     Neuro check q4, PT/OT, decrease dex, RASS goal -1 to -2, sedation with propofol  AC/VC 15/540/40/5  Levo/austin prn to maintain MAP > 65  Neel-trac  FOBT(+)  Meropenem for klebsiella bacteremia, id consultation  Will need CT CAP w/ contrast, holding off due to suspected ATN       PM EVENTS: no acute overnight events as of documentation time of this note.    ROS: negative except as mentioned above.    T(C): 38.9 (07-29-23 @ 19:00), Max: 39 (07-29-23 @ 18:00)  HR: 98 (07-29-23 @ 19:00) (88 - 133)  BP: 117/56 (07-29-23 @ 19:00) (69/42 - 168/67)  RR: 18 (07-29-23 @ 19:00) (15 - 23)  SpO2: 98% (07-29-23 @ 19:00) (94% - 100%)    Mode: AC/ CMV (Assist Control/ Continuous Mandatory Ventilation)  RR (machine): 14  TV (machine): 400  FiO2: 45  PEEP: 5  ITime: 1  MAP: 7  PIP: 10      I&O's Summary    28 Jul 2023 07:01  -  29 Jul 2023 07:00  --------------------------------------------------------  IN: 2609 mL / OUT: 1150 mL / NET: 1459 mL    29 Jul 2023 07:01  -  29 Jul 2023 19:12  --------------------------------------------------------  IN: 2842.5 mL / OUT: 2220 mL / NET: 622.5 mL        EXAM:       Neuro: grimaces to pain, not following commands, PERRL, gaze conjugate, POWER (R/L) upper AG/POB, LOWER TF/TF  Chest: nonlabored respirations, no adventitious lung sounds bilaterally, heart regular rate/rhythm, present S1/S2, no murmurs or rubs  Abdomen: nondistended, soft and nontender without peritoneal signs, normoactive bowel sounds  Extremities: no clubbing, well-perfused, no edema      ABG - ( 29 Jul 2023 16:51 )  pH, Arterial: 7.54  pH, Blood: x     /  pCO2: 19    /  pO2: 208   / HCO3: 16    / Base Excess: -3.9  /  SaO2: 99.0                                    8.0    6.50  )-----------( 88       ( 29 Jul 2023 15:01 )             23.3     07-29    136  |  112<H>  |  23  ----------------------------<  133<H>  4.1   |  16<L>  |  0.67    Ca    6.7<L>      29 Jul 2023 15:10  Phos  3.0     07-29  Mg     1.8     07-29    TPro  x   /  Alb  x   /  TBili  x   /  DBili  1.1<H>  /  AST  x   /  ALT  x   /  AlkPhos  x   07-29      MEDICATIONS  (STANDING):  atorvastatin 20 milliGRAM(s) Oral at bedtime  chlorhexidine 0.12% Liquid 15 milliLiter(s) Oral Mucosa every 12 hours  dexAMETHasone  Injectable 2 milliGRAM(s) IV Push every 12 hours  dexMEDEtomidine Infusion 0.2 MICROgram(s)/kG/Hr (3.63 mL/Hr) IV Continuous <Continuous>  escitalopram 5 milliGRAM(s) Oral daily  fluticasone propionate 50 MICROgram(s)/spray Nasal Spray 1 Spray(s) Both Nostrils two times a day  insulin lispro (ADMELOG) corrective regimen sliding scale   SubCutaneous Before meals and at bedtime  lacosamide IVPB 50 milliGRAM(s) IV Intermittent every 12 hours  meropenem  IVPB 1000 milliGRAM(s) IV Intermittent every 8 hours  norepinephrine Infusion 0.05 MICROgram(s)/kG/Min (3.4 mL/Hr) IV Continuous <Continuous>  pantoprazole  Injectable 40 milliGRAM(s) IV Push every 12 hours  propofol Infusion 5 MICROgram(s)/kG/Min (2.18 mL/Hr) IV Continuous <Continuous>  propofol Infusion 20 MICROgram(s)/kG/Min (8.71 mL/Hr) IV Continuous <Continuous>  psyllium Powder 1 Packet(s) Oral two times a day  sodium chloride 3 Gram(s) Oral every 6 hours  sodium chloride 0.9%. 1000 milliLiter(s) (75 mL/Hr) IV Continuous <Continuous>  sucralfate 1 Gram(s) Oral every 6 hours    MEDICATIONS  (PRN):  acetaminophen     Tablet .. 650 milliGRAM(s) Oral every 6 hours PRN Temp greater or equal to 38C (100.4F), Mild Pain (1 - 3)  benzocaine/menthol Lozenge 1 Lozenge Oral every 4 hours PRN Sore Throat  fentaNYL    Injectable 25 MICROGram(s) IV Push every 2 hours PRN vent dysynchrony  marijuana, medical 2 Capsule(s) Oral every 6 hours PRN Nausea  ondansetron Injectable 4 milliGRAM(s) IV Push every 6 hours PRN Nausea and/or Vomiting      Please see the day's note documented by Dr. Lerma for detailed ongoing assessment and plan.     Neuro check q4, PT/OT, decrease dex, RASS goal -1 to -2, sedation with propofol  AC/VC 15/540/40/5  Levo/austin prn to maintain MAP > 65  Neel-trac  FOBT(+)  Meropenem for klebsiella bacteremia, id consultation  Will need CT CAP w/ contrast, holding off due to suspected ATN

## 2023-07-29 NOTE — CHART NOTE - NSCHARTNOTEFT_GEN_A_CORE
***Rapid Response Clinical Impact Physician Assistant Note***    69 y/o Male w/ PMHx of glioblastoma s/p craniotomy w/ resection and Avastin treatment, tracheal stenosis s/p trach later reversed, T2DM, HLD, JAGDISH and BPH who is currently admitted for expressive aphasia, N/V x1 day w/ c/f glioblastoma disease progression and hyponatremia, with hospital course c/b possible sepsis being followed by ID off Abx. Rapid response called at 06:12AM for c/f aspiration. Patient was seen and examined at the bedside by the rapid response team. On arrival to bedside pt was seated in semifowlers position in bed in care of primary team. FS ; initial VS: T 99.7F oral, HR 97, BP 81/47, RR 28 mildly labored and shallow, SpO2 87% on 6L NC (applied for this event). Per witnessing RN pt reportedly had an episode of dark brown bilious vomiting w/ likely aspiration which prompted the rapid response. Initially no evidence of vomit or foreign body in airway, and ventilation was assisted via BVM on 15L O2 which was well tolerated w/ SpO2 improving to 97%. NS 1L IV bolus administered w/ improvement in BP to 104/67, HR 87. Physical exam significant for diffuse rhonchi R>L. Pt deemed unable to protect his airway and decision was made by primary team to intubate, at which time Anesthesia STAT was called overhead and pt was transferred to [facility] Neuro ICU for further management. Induction w/ total of Propofol 70mg IVP and Succinacholine 40mg IVP which was successful but c/b multiple further episodes of dark brown bilious, non-bloody vomiting requiring suctioning. VS after intubation: HR 86, /68, RR 14 (ventilated), SpO2 99% on 100% FiO2.    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  Type 2 diabetes mellitus  Hyperlipidemia  Iron deficiency anemia  Nephrolithiasis  Thrombocytopenia  Hyponatremia  BPH (benign prostatic hyperplasia)  S/P craniotomy  H/O tracheostomy      Vital Signs Last 24 Hrs  T(C): 37.7 (29 Jul 2023 05:00), Max: 39.2 (28 Jul 2023 18:45)  T(F): 99.8 (29 Jul 2023 05:00), Max: 102.6 (28 Jul 2023 18:45)  HR: 98 (29 Jul 2023 06:20) (92 - 133)  BP: 81/47 (29 Jul 2023 06:20) (69/42 - 118/75)  BP(mean): 51 (29 Jul 2023 06:20) (50 - 80)  RR: 21 (29 Jul 2023 06:16) (16 - 23)  SpO2: 94% (29 Jul 2023 06:20) (94% - 99%)    Parameters below as of 29 Jul 2023 06:14  Patient On (Oxygen Delivery Method): room air      Review of Systems:  Unable to obtain    Physical exam:  GENERAL: The patient is awake and alert in no apparent distress.   HEENT: Head is normocephalic and atraumatic. Extraocular muscles are intact. Mucous membranes are moist. No throat erythema/exudates no lymphadenopathy, no JVD,   NECK: Supple.  LUNGS:   HEART: Regular rate and rhythm ,+S1/+S2, no murmurs, rubs, gallops  ABDOMEN: Soft, nontender, and nondistended, no rebound, guarding rigidity, bowel sounds in all 4 quadrants  EXTREMITIES: Without any cyanosis, clubbing, rash, lesions or edema.  SKIN: No new rashes or lesions.  MSK: strength equal BL  VASCULAR: Radial and Dorsal pedal pulses palpable BL.  NEUROLOGIC: Painfully responsive.  PSYCH: UTEDILIA    07-28 @ 07:01  -  07-29 @ 07:00  --------------------------------------------------------  IN: 2600 mL / OUT: 1150 mL / NET: 1450 mL                              6.7    3.33  )-----------( 55       ( 29 Jul 2023 06:20 )             20.6     07-29    135  |  x   |  x   ----------------------------<  x   x    |  x   |  0.71    Ca    6.3<LL>      29 Jul 2023 05:38  Phos  3.0     07-29  Mg     1.8     07-29    TPro  4.2<L>  /  Alb  2.2<L>  /  TBili  2.8<H>  /  DBili  x   /  AST  34  /  ALT  32  /  AlkPhos  56  07-28           LIVER FUNCTIONS - ( 28 Jul 2023 17:00 )  Alb: 2.2 g/dL / Pro: 4.2 g/dL / ALK PHOS: 56 U/L / ALT: 32 U/L / AST: 34 U/L / GGT: x         Urinalysis Basic - ( 29 Jul 2023 05:38 )    Color: x / Appearance: x / SG: x / pH: x  Gluc: 38 mg/dL / Ketone: x  / Bili: x / Urobili: x   Blood: x / Protein: x / Nitrite: x   Leuk Esterase: x / RBC: x / WBC x   Sq Epi: x / Non Sq Epi: x / Bacteria: x             MEDICATIONS  (STANDING):  atorvastatin 20 milliGRAM(s) Oral at bedtime  dexAMETHasone  Injectable 4 milliGRAM(s) IV Push every 12 hours  dextrose 50% Injectable 25 milliLiter(s) IV Push once  escitalopram 10 milliGRAM(s) Oral daily  fluticasone propionate 50 MICROgram(s)/spray Nasal Spray 1 Spray(s) Both Nostrils two times a day  insulin glargine Injectable (LANTUS) 18 Unit(s) SubCutaneous at bedtime  insulin lispro (ADMELOG) corrective regimen sliding scale   SubCutaneous Before meals and at bedtime  levETIRAcetam  IVPB 1000 milliGRAM(s) IV Intermittent every 12 hours  melatonin 5 milliGRAM(s) Oral at bedtime  meropenem  IVPB 1000 milliGRAM(s) IV Intermittent every 8 hours  propofol Infusion 5 MICROgram(s)/kG/Min (2.18 mL/Hr) IV Continuous <Continuous>  psyllium Powder 1 Packet(s) Oral two times a day  sodium chloride 3 Gram(s) Oral every 6 hours  sodium chloride 0.9%. 1000 milliLiter(s) (75 mL/Hr) IV Continuous <Continuous>  sucralfate 1 Gram(s) Oral every 12 hours  vancomycin  IVPB 1000 milliGRAM(s) IV Intermittent every 12 hours    MEDICATIONS  (PRN):  acetaminophen     Tablet .. 650 milliGRAM(s) Oral every 6 hours PRN Temp greater or equal to 38C (100.4F), Mild Pain (1 - 3)  benzocaine/menthol Lozenge 1 Lozenge Oral every 4 hours PRN Sore Throat  ibuprofen  Tablet. 400 milliGRAM(s) Oral every 6 hours PRN Temp greater or equal to 38C (100.4F)  marijuana, medical 2 Capsule(s) Oral every 6 hours PRN Nausea  ondansetron Injectable 4 milliGRAM(s) IV Push every 6 hours PRN Nausea and/or Vomiting      Assessment-  69 y/o Male w/ PMHx of glioblastoma s/p craniotomy w/ resection and Avastin treatment, tracheal stenosis s/p trach later reversed, T2DM, HLD, JAGDISH and BPH who is currently admitted for expressive aphasia, N/V x1 day w/ c/f glioblastoma disease progression and hyponatremia, with hospital course c/b possible sepsis being followed by ID off Abx. Rapid response called at 06:12AM for c/f aspiration. Per witnessing RN pt reportedly had an episode of dark brown bilious vomiting w/ likely aspiration which prompted the rapid response. Pt deemed unable to protect his airway and was subsequently transferred to [facility] for further management and intubated for airway protection    Plan-  #Aspiration event  -Maintain strict aspiration precautions  -Consider NG/OG tube to suction for gastric decompression  -c/w Zofran PRN and Medical Marijuana PRN for nausea/vomiting  -Obtain CXR/POCUS to assess degree of likely aspiration  -Management of suspected glioblastoma disease progression per primary team      I have personally and independently provided 31 minutes of critical care services.  This excludes any time spent on separate procedures or teaching. ***Rapid Response Clinical Impact Physician Assistant Note***    69 y/o Male w/ PMHx of glioblastoma s/p craniotomy w/ resection and Avastin treatment, tracheal stenosis s/p trach later reversed, T2DM, HLD, JAGDISH and BPH who is currently admitted for expressive aphasia, N/V x1 day w/ c/f glioblastoma disease progression and hyponatremia, with hospital course c/b possible sepsis being followed by ID off Abx. Rapid response called at 06:12AM for c/f aspiration. Patient was seen and examined at the bedside by the rapid response team. On arrival to bedside pt was seated in semifowlers position in bed in care of primary team. FS BGL 37; initial VS: T 99.7F oral, HR 97, BP 81/47, RR 28 mildly labored and shallow, SpO2 87% on 6L NC (applied for this event). Per witnessing RN pt reportedly had an episode of dark brown bilious vomiting w/ likely aspiration which prompted the rapid response. Initially no evidence of vomit or foreign body in airway, and ventilation was assisted via BVM on 15L O2 which was well tolerated w/ SpO2 improving to 97%. Pt given 1 amp D50 w/ repeat FS . NS 1L IV bolus administered w/ improvement in BP to 104/67, HR 87. Physical exam significant for diffuse rhonchi R>L. Pt deemed unable to protect his airway and decision was made by primary team to intubate, at which time Anesthesia STAT was called overhead and pt was transferred to [facility] Neuro ICU for further management. Induction w/ total of Propofol 70mg IVP and Succinylcholine 40mg IVP which was successful but c/b multiple further episodes of dark brown bilious, non-bloody vomiting requiring suctioning. VS after intubation: HR 86, /68, RR 14 (ventilated), SpO2 99% on 100% FiO2.    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  Type 2 diabetes mellitus  Hyperlipidemia  Iron deficiency anemia  Nephrolithiasis  Thrombocytopenia  Hyponatremia  BPH (benign prostatic hyperplasia)  S/P craniotomy  H/O tracheostomy      Vital Signs Last 24 Hrs  T(C): 37.7 (29 Jul 2023 05:00), Max: 39.2 (28 Jul 2023 18:45)  T(F): 99.8 (29 Jul 2023 05:00), Max: 102.6 (28 Jul 2023 18:45)  HR: 98 (29 Jul 2023 06:20) (92 - 133)  BP: 81/47 (29 Jul 2023 06:20) (69/42 - 118/75)  BP(mean): 51 (29 Jul 2023 06:20) (50 - 80)  RR: 21 (29 Jul 2023 06:16) (16 - 23)  SpO2: 94% (29 Jul 2023 06:20) (94% - 99%)    Parameters below as of 29 Jul 2023 06:14  Patient On (Oxygen Delivery Method): room air      Review of Systems:  Unable to obtain    Physical exam:  GENERAL: The patient is awake and alert in no apparent distress.   HEENT: Head is normocephalic and atraumatic. Extraocular muscles are intact. Mucous membranes are moist. No throat erythema/exudates no lymphadenopathy, no JVD,   NECK: Supple.  LUNGS:   HEART: Regular rate and rhythm ,+S1/+S2, no murmurs, rubs, gallops  ABDOMEN: Soft, nontender, and nondistended, no rebound, guarding rigidity, bowel sounds in all 4 quadrants  EXTREMITIES: Without any cyanosis, clubbing, rash, lesions or edema.  SKIN: No new rashes or lesions.  MSK: strength equal BL  VASCULAR: Radial and Dorsal pedal pulses palpable BL.  NEUROLOGIC: Painfully responsive.  PSYCH: UTO    07-28 @ 07:01  -  07-29 @ 07:00  --------------------------------------------------------  IN: 2600 mL / OUT: 1150 mL / NET: 1450 mL                              6.7    3.33  )-----------( 55       ( 29 Jul 2023 06:20 )             20.6     07-29    135  |  x   |  x   ----------------------------<  x   x    |  x   |  0.71    Ca    6.3<LL>      29 Jul 2023 05:38  Phos  3.0     07-29  Mg     1.8     07-29    TPro  4.2<L>  /  Alb  2.2<L>  /  TBili  2.8<H>  /  DBili  x   /  AST  34  /  ALT  32  /  AlkPhos  56  07-28           LIVER FUNCTIONS - ( 28 Jul 2023 17:00 )  Alb: 2.2 g/dL / Pro: 4.2 g/dL / ALK PHOS: 56 U/L / ALT: 32 U/L / AST: 34 U/L / GGT: x         Urinalysis Basic - ( 29 Jul 2023 05:38 )    Color: x / Appearance: x / SG: x / pH: x  Gluc: 38 mg/dL / Ketone: x  / Bili: x / Urobili: x   Blood: x / Protein: x / Nitrite: x   Leuk Esterase: x / RBC: x / WBC x   Sq Epi: x / Non Sq Epi: x / Bacteria: x             MEDICATIONS  (STANDING):  atorvastatin 20 milliGRAM(s) Oral at bedtime  dexAMETHasone  Injectable 4 milliGRAM(s) IV Push every 12 hours  dextrose 50% Injectable 25 milliLiter(s) IV Push once  escitalopram 10 milliGRAM(s) Oral daily  fluticasone propionate 50 MICROgram(s)/spray Nasal Spray 1 Spray(s) Both Nostrils two times a day  insulin glargine Injectable (LANTUS) 18 Unit(s) SubCutaneous at bedtime  insulin lispro (ADMELOG) corrective regimen sliding scale   SubCutaneous Before meals and at bedtime  levETIRAcetam  IVPB 1000 milliGRAM(s) IV Intermittent every 12 hours  melatonin 5 milliGRAM(s) Oral at bedtime  meropenem  IVPB 1000 milliGRAM(s) IV Intermittent every 8 hours  propofol Infusion 5 MICROgram(s)/kG/Min (2.18 mL/Hr) IV Continuous <Continuous>  psyllium Powder 1 Packet(s) Oral two times a day  sodium chloride 3 Gram(s) Oral every 6 hours  sodium chloride 0.9%. 1000 milliLiter(s) (75 mL/Hr) IV Continuous <Continuous>  sucralfate 1 Gram(s) Oral every 12 hours  vancomycin  IVPB 1000 milliGRAM(s) IV Intermittent every 12 hours    MEDICATIONS  (PRN):  acetaminophen     Tablet .. 650 milliGRAM(s) Oral every 6 hours PRN Temp greater or equal to 38C (100.4F), Mild Pain (1 - 3)  benzocaine/menthol Lozenge 1 Lozenge Oral every 4 hours PRN Sore Throat  ibuprofen  Tablet. 400 milliGRAM(s) Oral every 6 hours PRN Temp greater or equal to 38C (100.4F)  marijuana, medical 2 Capsule(s) Oral every 6 hours PRN Nausea  ondansetron Injectable 4 milliGRAM(s) IV Push every 6 hours PRN Nausea and/or Vomiting      Assessment-  69 y/o Male w/ PMHx of glioblastoma s/p craniotomy w/ resection and Avastin treatment, tracheal stenosis s/p trach later reversed, T2DM, HLD, JAGDISH and BPH who is currently admitted for expressive aphasia, N/V x1 day w/ c/f glioblastoma disease progression and hyponatremia, with hospital course c/b possible sepsis being followed by ID off Abx. Rapid response called at 06:12AM for c/f aspiration. Per witnessing RN pt reportedly had an episode of dark brown bilious vomiting w/ likely aspiration which prompted the rapid response. Pt noted to be hypoglycemic to 37 for which 1 amp D50 IVP was given, Hypotensive to BP 81/47 for which 1L NS bolus was given, and pt deemed unable to protect his airway and was subsequently transferred to [facility] for further management where he was intubated for airway protection.    Plan-  #Aspiration event  -Maintain strict aspiration precautions  -Consider NG/OG tube to suction for gastric decompression  -c/w Zofran PRN and Medical Marijuana PRN for nausea/vomiting  -Obtain CXR/POCUS to assess severity of likely aspiration  -Obtain formal speech and swallow eval once extubated  -Management of suspected glioblastoma disease progression per primary team  -Confirm goals of care    #Hypoglycemia - Likely i/s/o poor PO intake and standing insulin regimen w/ possible component of sepsis  -Hold standing insulin regimen in favor of sliding scale while NPO or if pt continues w/ poor PO intake    #Sepsis  -BCx from 7/28 growing K. pneumoniae group  -Broadened Abx per primary team pending culture sensitivities  -Consider ID consult      I have personally and independently provided 31 minutes of critical care services.  This excludes any time spent on separate procedures or teaching. ***Rapid Response Clinical Impact Physician Assistant Note***    69 y/o Male w/ PMHx of glioblastoma s/p craniotomy w/ resection and Avastin treatment, tracheal stenosis s/p trach later reversed, T2DM, HLD, JAGDISH and BPH who is currently admitted for expressive aphasia, N/V x1 day w/ c/f glioblastoma disease progression and hyponatremia, with hospital course c/b possible sepsis being followed by ID off Abx. Rapid response called at 06:12AM for c/f aspiration. Patient was seen and examined at the bedside by the rapid response team. On arrival to bedside pt was seated in semifowlers position in bed in care of primary team. FS BGL 37; initial VS: T 99.7F oral, HR 97, BP 81/47, RR 28 mildly labored and shallow, SpO2 87% on 6L NC (applied for this event). Per witnessing RN pt reportedly had an episode of dark brown bilious vomiting w/ likely aspiration which prompted the rapid response. Initially no evidence of vomit or foreign body in airway, and ventilation was assisted via BVM on 15L O2 which was well tolerated w/ SpO2 improving to 97%. Pt given 1 amp D50 w/ repeat FS . NS 1L IV bolus administered w/ improvement in BP to 104/67, HR 87. Physical exam significant for diffuse rhonchi R>L. Pt deemed unable to protect his airway and decision was made by primary team to intubate, at which time Anesthesia STAT was called overhead and pt was transferred to [facility] Neuro ICU for further management. Pt was induced w/ total of Propofol 70mg IVP and Succinylcholine 40mg IVP and subsequently intubated successfully but c/b multiple further episodes of dark brown bilious, non-bloody vomiting requiring suctioning. VS after intubation: HR 86, /68, RR 14 (ventilated), SpO2 99% on 100% FiO2.    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  Type 2 diabetes mellitus  Hyperlipidemia  Iron deficiency anemia  Nephrolithiasis  Thrombocytopenia  Hyponatremia  BPH (benign prostatic hyperplasia)  S/P craniotomy  H/O tracheostomy      Vital Signs Last 24 Hrs  T(C): 37.7 (29 Jul 2023 05:00), Max: 39.2 (28 Jul 2023 18:45)  T(F): 99.8 (29 Jul 2023 05:00), Max: 102.6 (28 Jul 2023 18:45)  HR: 98 (29 Jul 2023 06:20) (92 - 133)  BP: 81/47 (29 Jul 2023 06:20) (69/42 - 118/75)  BP(mean): 51 (29 Jul 2023 06:20) (50 - 80)  RR: 21 (29 Jul 2023 06:16) (16 - 23)  SpO2: 94% (29 Jul 2023 06:20) (94% - 99%)    Parameters below as of 29 Jul 2023 06:14  Patient On (Oxygen Delivery Method): room air      Review of Systems:  Unable to obtain    Physical exam:  GENERAL: The patient is awake and alert in no apparent distress.   HEENT: Head is normocephalic and atraumatic. Extraocular muscles are intact. Mucous membranes are moist. No throat erythema/exudates no lymphadenopathy, no JVD,   NECK: Supple.  LUNGS:   HEART: Regular rate and rhythm ,+S1/+S2, no murmurs, rubs, gallops  ABDOMEN: Soft, nontender, and nondistended, no rebound, guarding rigidity, bowel sounds in all 4 quadrants  EXTREMITIES: Without any cyanosis, clubbing, rash, lesions or edema.  SKIN: No new rashes or lesions.  MSK: strength equal BL  VASCULAR: Radial and Dorsal pedal pulses palpable BL.  NEUROLOGIC: Painfully responsive.  PSYCH: LAVERNE    07-28 @ 07:01  -  07-29 @ 07:00  --------------------------------------------------------  IN: 2600 mL / OUT: 1150 mL / NET: 1450 mL                              6.7    3.33  )-----------( 55       ( 29 Jul 2023 06:20 )             20.6     07-29    135  |  x   |  x   ----------------------------<  x   x    |  x   |  0.71    Ca    6.3<LL>      29 Jul 2023 05:38  Phos  3.0     07-29  Mg     1.8     07-29    TPro  4.2<L>  /  Alb  2.2<L>  /  TBili  2.8<H>  /  DBili  x   /  AST  34  /  ALT  32  /  AlkPhos  56  07-28           LIVER FUNCTIONS - ( 28 Jul 2023 17:00 )  Alb: 2.2 g/dL / Pro: 4.2 g/dL / ALK PHOS: 56 U/L / ALT: 32 U/L / AST: 34 U/L / GGT: x         Urinalysis Basic - ( 29 Jul 2023 05:38 )    Color: x / Appearance: x / SG: x / pH: x  Gluc: 38 mg/dL / Ketone: x  / Bili: x / Urobili: x   Blood: x / Protein: x / Nitrite: x   Leuk Esterase: x / RBC: x / WBC x   Sq Epi: x / Non Sq Epi: x / Bacteria: x             MEDICATIONS  (STANDING):  atorvastatin 20 milliGRAM(s) Oral at bedtime  dexAMETHasone  Injectable 4 milliGRAM(s) IV Push every 12 hours  dextrose 50% Injectable 25 milliLiter(s) IV Push once  escitalopram 10 milliGRAM(s) Oral daily  fluticasone propionate 50 MICROgram(s)/spray Nasal Spray 1 Spray(s) Both Nostrils two times a day  insulin glargine Injectable (LANTUS) 18 Unit(s) SubCutaneous at bedtime  insulin lispro (ADMELOG) corrective regimen sliding scale   SubCutaneous Before meals and at bedtime  levETIRAcetam  IVPB 1000 milliGRAM(s) IV Intermittent every 12 hours  melatonin 5 milliGRAM(s) Oral at bedtime  meropenem  IVPB 1000 milliGRAM(s) IV Intermittent every 8 hours  propofol Infusion 5 MICROgram(s)/kG/Min (2.18 mL/Hr) IV Continuous <Continuous>  psyllium Powder 1 Packet(s) Oral two times a day  sodium chloride 3 Gram(s) Oral every 6 hours  sodium chloride 0.9%. 1000 milliLiter(s) (75 mL/Hr) IV Continuous <Continuous>  sucralfate 1 Gram(s) Oral every 12 hours  vancomycin  IVPB 1000 milliGRAM(s) IV Intermittent every 12 hours    MEDICATIONS  (PRN):  acetaminophen     Tablet .. 650 milliGRAM(s) Oral every 6 hours PRN Temp greater or equal to 38C (100.4F), Mild Pain (1 - 3)  benzocaine/menthol Lozenge 1 Lozenge Oral every 4 hours PRN Sore Throat  ibuprofen  Tablet. 400 milliGRAM(s) Oral every 6 hours PRN Temp greater or equal to 38C (100.4F)  marijuana, medical 2 Capsule(s) Oral every 6 hours PRN Nausea  ondansetron Injectable 4 milliGRAM(s) IV Push every 6 hours PRN Nausea and/or Vomiting      Assessment-  69 y/o Male w/ PMHx of glioblastoma s/p craniotomy w/ resection and Avastin treatment, tracheal stenosis s/p trach later reversed, T2DM, HLD, JAGDISH and BPH who is currently admitted for expressive aphasia, N/V x1 day w/ c/f glioblastoma disease progression and hyponatremia, with hospital course c/b possible sepsis being followed by ID off Abx. Rapid response called at 06:12AM for c/f aspiration. Per witnessing RN pt reportedly had an episode of dark brown bilious vomiting w/ likely aspiration which prompted the rapid response. Pt noted to be hypoglycemic to 37 for which 1 amp D50 IVP was given, Hypotensive to BP 81/47 for which 1L NS bolus was given, and pt deemed unable to protect his airway and was subsequently transferred to [facility] for further management where he was intubated for airway protection.    Plan-  #Aspiration event  -Maintain strict aspiration precautions  -Consider NG/OG tube to suction for gastric decompression  -c/w Zofran PRN and Medical Marijuana PRN for nausea/vomiting  -Obtain CXR/POCUS to assess severity of likely aspiration  -Obtain formal speech and swallow eval once extubated  -Management of suspected glioblastoma disease progression per primary team  -Confirm goals of care    #Hypoglycemia - Likely i/s/o poor PO intake and standing insulin regimen w/ possible component of sepsis  -Hold standing insulin regimen in favor of sliding scale while NPO or if pt continues w/ poor PO intake    #Sepsis  -BCx from 7/28 growing K. pneumoniae group  -Broadened Abx per primary team pending culture sensitivities  -Consider ID consult      I have personally and independently provided 31 minutes of critical care services.  This excludes any time spent on separate procedures or teaching.

## 2023-07-29 NOTE — CONSULT NOTE ADULT - ATTENDING COMMENTS
Acute on Chronic Hyponatremia as outlined in Dr. Purvis's note above.  Case reviewed throughout the morning.  Labs and urine studies appreciated.  Given am sNa of 134, agree with fellow's plan to move away from 3% Saline and add Urena.  However, must carefully move forward.  Please trend labs closely as outlined.  Goal of keeping serum Na >130.  Please contact renal fellow with changes in sNa on f/u lab draws.
Agree with above.   Picture c/w sepsis.  Once stabilized, reasonable to evaluate with EGD to r/o UGI sources of GIB
I evaluated the patient with the hematology fellow, Dr Garcia, this morning.  Briefly, the patient is a 68 year old man with glioblastoma (IDH-WT, MGMT methylated) diagnosed in 2022 s/p resection, chemoradiation/temozolomide.  In 3/2023 he was admitted to receive treatment with intra-arterial bevacizumab on protocol.      Now admitted after mental status change and speech change.  Stroke eval negative.  MRI of the brain shows a new area of nodular enhancement, worrisome for tumor recurrence.  There is also some concern for possible hydrocephalus.  The patient is on high dose steroids.  he reports this morning a mild headache but says he is feeling better.    Hematology consulted for thrombocytopenia.  The patient has mild chronic thrombocytopenia (platelet count > 100).  On admission this time, his platelet count was 129.  Trended down to a flores of 69 but this AM is back up to 114.  He has anemia but improved compared to previous hospital stay.  Previous w/u for t-penia included B12/folate levels that are normal.  Hepatitis B and C testing negative this admission.    We reviewed the peripheral blood smear.  t-penia is genuine/no clumping.  Platelet morphology unremarkable.  RBC morphology remarkable for numerous echinocytes suggestive of a metabolic disturbance.  No findings to suggest hemolysis.    Suspect that this patient has chronic thrombocytopenia related to past temozolomide treatment.  There may also be an immune-mediated component which the high dose steroids are suppressing.  Since platelet count has returned to baseline this AM, we will refrain from an exhaustive workup.  If the patient requires an operative intervention and platelet count is below the goal for neurosurgery, then can support w/ platelet transfusion.

## 2023-07-29 NOTE — PROGRESS NOTE ADULT - SUBJECTIVE AND OBJECTIVE BOX
INTERVAL HISTORY: HPI:  Osiel Norwood is a 67YO male with a past medical history of type 2 diabetes, hyperlipidemia, iron deficiency anemia, tracheal stenosis s/p tracheostomy (which was closed by ENT 7 days ago); glioblastoma s/p resection 1/27/2022, and Avastin treatment 3/2023 presenting with altered mental status and weakness x2 days. Per family, they last saw him last night around 6pm when he was at baseline. Patient has intermittent expressive aphasia but is getting worse and now is persistent, and has intermittent nausea and vomiting since last night. Patient was supposed to receive an outpatient brain MRI on 7/25/2023.  Stroke code performed in  is negative for CVA. Head CT shows new hyperdensity in frontal parietal - surgical site. MRI is ordered to rule out tumor recurrence.   Per daughter at bedside, Entresto and Eliquis were stopped per his PC - cardiologist.      (07 Jul 2023 13:10)      MEDICATIONS  (STANDING):  atorvastatin 20 milliGRAM(s) Oral at bedtime  chlorhexidine 0.12% Liquid 15 milliLiter(s) Oral Mucosa every 12 hours  dexAMETHasone  Injectable 2 milliGRAM(s) IV Push every 12 hours  dexMEDEtomidine Infusion 0.2 MICROgram(s)/kG/Hr (3.63 mL/Hr) IV Continuous <Continuous>  escitalopram 5 milliGRAM(s) Oral daily  fluticasone propionate 50 MICROgram(s)/spray Nasal Spray 1 Spray(s) Both Nostrils two times a day  insulin lispro (ADMELOG) corrective regimen sliding scale   SubCutaneous Before meals and at bedtime  lacosamide IVPB 50 milliGRAM(s) IV Intermittent every 12 hours  meropenem  IVPB 1000 milliGRAM(s) IV Intermittent every 8 hours  norepinephrine Infusion 0.05 MICROgram(s)/kG/Min (3.4 mL/Hr) IV Continuous <Continuous>  pantoprazole  Injectable 40 milliGRAM(s) IV Push every 12 hours  propofol Infusion 5 MICROgram(s)/kG/Min (2.18 mL/Hr) IV Continuous <Continuous>  propofol Infusion 20 MICROgram(s)/kG/Min (8.71 mL/Hr) IV Continuous <Continuous>  psyllium Powder 1 Packet(s) Oral two times a day  sodium chloride 3 Gram(s) Oral every 6 hours  sodium chloride 0.9%. 1000 milliLiter(s) (75 mL/Hr) IV Continuous <Continuous>  sucralfate 1 Gram(s) Oral every 6 hours    MEDICATIONS  (PRN):  acetaminophen     Tablet .. 650 milliGRAM(s) Oral every 6 hours PRN Temp greater or equal to 38C (100.4F), Mild Pain (1 - 3)  benzocaine/menthol Lozenge 1 Lozenge Oral every 4 hours PRN Sore Throat  fentaNYL    Injectable 25 MICROGram(s) IV Push every 2 hours PRN vent dysynchrony  marijuana, medical 2 Capsule(s) Oral every 6 hours PRN Nausea  ondansetron Injectable 4 milliGRAM(s) IV Push every 6 hours PRN Nausea and/or Vomiting      Drug Dosing Weight  Height (cm): 170.2 (07 Jul 2023 10:41)  Weight (kg): 72.6 (07 Jul 2023 10:41)  BMI (kg/m2): 25.1 (07 Jul 2023 10:41)  BSA (m2): 1.84 (07 Jul 2023 10:41)    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      Type 2 diabetes mellitus      Hyperlipidemia      Iron deficiency anemia      Nephrolithiasis      Thrombocytopenia      Hyponatremia      BPH (benign prostatic hyperplasia)      S/P craniotomy      H/O tracheostomy          REVIEW OF SYSTEMS: [ ] Unable to Assess due to neurologic exam   [ ] All ROS addressed below are non-contributory, except:  Neuro: [ ] Headache [ ] Back pain [ ] Numbness [ ] Weakness [ ] Ataxia [ ] Dizziness [ ] Aphasia [ ] Dysarthria [ ] Visual disturbance  Resp: [ ] Shortness of breath/dyspnea, [ ] Orthopnea [ ] Cough  CV: [ ] Chest pain [ ] Palpitation [ ] Lightheadedness [ ] Syncope  Renal: [ ] Thirst [ ] Edema  GI: [ ] Nausea [ ] Emesis [ ] Abdominal pain [ ] Constipation [ ] Diarrhea  Hem: [ ] Hematemesis [ ] bright red blood per rectum  ID: [ ] Fever [ ] Chills [ ] Dysuria  ENT: [ ] Rhinorrhea    PHYSICAL EXAM:    General: No Acute Distress, intubated   Neurological: grimace to pain, not following commands, eyes not open to nox, pupils 3mm reactive & midline b/l, WD all extremities to nos, +cough/+gag,   Pulmonary: Clear to Auscultation, No Rales, No Rhonchi, No Wheezes   Cardiovascular: S1, S2, Regular Rate and Rhythm   Gastrointestinal: Soft, Nontender, Nondistended   Extremities: No edema     ICU Vital Signs Last 24 Hrs  T(C): 39 (29 Jul 2023 18:00), Max: 39.2 (28 Jul 2023 18:45)  T(F): 102.2 (29 Jul 2023 18:00), Max: 102.6 (28 Jul 2023 18:45)  HR: 100 (29 Jul 2023 18:00) (88 - 133)  BP: 112/58 (29 Jul 2023 18:00) (69/42 - 168/67)  BP(mean): 80 (29 Jul 2023 18:00) (50 - 108)  ABP: 110/41 (29 Jul 2023 18:00) (99/47 - 135/50)  ABP(mean): 62 (29 Jul 2023 18:00) (62 - 77)  RR: 18 (29 Jul 2023 18:00) (15 - 23)  SpO2: 99% (29 Jul 2023 18:00) (94% - 100%)    O2 Parameters below as of 29 Jul 2023 18:00  Patient On (Oxygen Delivery Method): ventilator    O2 Concentration (%): 40      ABG - ( 29 Jul 2023 16:51 )  pH, Arterial: 7.54  pH, Blood: x     /  pCO2: 19    /  pO2: 208   / HCO3: 16    / Base Excess: -3.9  /  SaO2: 99.0              I&O's Detail    28 Jul 2023 07:01  -  29 Jul 2023 07:00  --------------------------------------------------------  IN:    Propofol: 9 mL    sodium chloride 0.9%: 600 mL    Sodium Chloride 0.9% Bolus: 2000 mL  Total IN: 2609 mL    OUT:    Voided (mL): 1150 mL  Total OUT: 1150 mL    Total NET: 1459 mL      29 Jul 2023 07:01  -  29 Jul 2023 18:39  --------------------------------------------------------  IN:    Dexmedetomidine: 108.9 mL    IV PiggyBack: 400 mL    Norepinephrine: 370.9 mL    Norepinephrine: 139.2 mL    Platelets - Single Donor: 220 mL    PRBCs (Packed Red Blood Cells): 300 mL    Propofol: 12.7 mL    Propofol: 30.5 mL    sodium chloride 0.9%: 150 mL    Sodium Chloride 0.9% Bolus: 1000 mL  Total IN: 2732.2 mL    OUT:    Incontinent per Condom Catheter (mL): 0 mL    Indwelling Catheter - Urethral (mL): 1050 mL    Nasogastric/Oral tube (mL): 900 mL    Voided (mL): 0 mL  Total OUT: 1950 mL    Total NET: 782.2 mL        Mode: AC/ CMV (Assist Control/ Continuous Mandatory Ventilation)  RR (machine): 14  TV (machine): 400  FiO2: 45  PEEP: 5  ITime: 1  MAP: 7  PIP: 10        LABS:  CBC Full  -  ( 29 Jul 2023 15:01 )  WBC Count : 6.50 K/uL  RBC Count : 2.40 M/uL  Hemoglobin : 8.0 g/dL  Hematocrit : 23.3 %  Platelet Count - Automated : 88 K/uL  Mean Cell Volume : 97.1 fl  Mean Cell Hemoglobin : 33.3 pg  Mean Cell Hemoglobin Concentration : 34.3 gm/dL  Auto Neutrophil # : x  Auto Lymphocyte # : x  Auto Monocyte # : x  Auto Eosinophil # : x  Auto Basophil # : x  Auto Neutrophil % : x  Auto Lymphocyte % : x  Auto Monocyte % : x  Auto Eosinophil % : x  Auto Basophil % : x    07-29    136  |  112<H>  |  23  ----------------------------<  133<H>  4.1   |  16<L>  |  0.67    Ca    6.7<L>      29 Jul 2023 15:10  Phos  3.0     07-29  Mg     1.8     07-29    TPro  x   /  Alb  x   /  TBili  x   /  DBili  1.1<H>  /  AST  x   /  ALT  x   /  AlkPhos  x   07-29    PT/INR - ( 29 Jul 2023 15:34 )   PT: 14.3 sec;   INR: 1.26          PTT - ( 29 Jul 2023 15:34 )  PTT:27.4 sec  Urinalysis Basic - ( 29 Jul 2023 15:10 )    Color: x / Appearance: x / SG: x / pH: x  Gluc: 133 mg/dL / Ketone: x  / Bili: x / Urobili: x   Blood: x / Protein: x / Nitrite: x   Leuk Esterase: x / RBC: x / WBC x   Sq Epi: x / Non Sq Epi: x / Bacteria: x        RADIOLOGY & ADDITIONAL STUDIES:

## 2023-07-29 NOTE — PROGRESS NOTE ADULT - ASSESSMENT
Assessment:      NEURO:  -neuro check q4  -pain management w/ tylenol  -PT/OT evaluation when stable   -decrease dexamethasone from 4q12 to 2q12 in setting of septic shock 2/2 bacteremia   sedation w/ propofol RASS goal -1 to -2     PULMONARY:  Currently requiring mechanical ventilation for air-way protection   Vent settings 450/15/5/40  repeat ABG and adjust fio2     CARDIOVASCULAR:  Hypotension in setting of likely septic shock ? combined w/ acute blood loss anemia 2/2 GIB  norepinephrine ggt MAP goal >65   Neel-trac monitoring   monitor on telemetry   vitals q1  TTE ordered and pending; former hx of heartfailure; per family EF 55% on repeat TTE as outpatient     GASTROENTEROLOGY:  fecal occult (+)  OG tube to intermittant suction w/ coffee ground appearing fluid   GI consulted for endoscopy evaluation   GI ppx : Protonix 40mg BID for suspected GIB   Daily stool count,    RENAL/:  poor urine output; suspect ATN 2/2 hypotension/sepsis   -check BMP q12  -strict i/o's ; critically ill ,will need Campuzano  placement   -Na goal 135-145; replete lytes     ENDOCRINE:  Diabetes Mellitus  A1c- 5.7  Monitor FSG q6 hrs while NPO  HISS    HEME/ONC:  pancytopenia ?chemo induced vs DIC; will send DIC labs; concern for GIB trend hgb q8hrs, platelets txfusion x1 goal >80  DVT ppx: will hold chemical dvt ppx in setting of low platelets  b/l SCDs    INFECTIOUS:   Monitor for fevers   post operative antibiotic w/ ancef   klebsiella bacteremia - c/w meropenem ID following, d/c vancomycin   will need CT/ab/pelvis w/ contrast to look for source, however suspect developing ATN will hold off for now

## 2023-07-29 NOTE — PROGRESS NOTE ADULT - SUBJECTIVE AND OBJECTIVE BOX
***********************************************  ADULT NSICU PROGRESS NOTE  JORDAN CHAKRABORTY [id number] [facility] 08EA [id number]  ***********************************************    24H INTERVAL EVENTS:    ROS: negative except per mentioned above in 24h interval events.      HOSPITAL COURSE CARRIED FORWARD:    VITALS:    ICU Vital Signs Last 24 Hrs  T(C): 37.7 (29 Jul 2023 05:00), Max: 39.2 (28 Jul 2023 18:45)  T(F): 99.8 (29 Jul 2023 05:00), Max: 102.6 (28 Jul 2023 18:45)  HR: 98 (29 Jul 2023 06:20) (92 - 133)  BP: 81/47 (29 Jul 2023 06:20) (69/42 - 118/75)  BP(mean): 51 (29 Jul 2023 06:20) (50 - 80)  ABP: --  ABP(mean): --  RR: 21 (29 Jul 2023 06:16) (16 - 23)  SpO2: 94% (29 Jul 2023 06:20) (94% - 99%)    O2 Parameters below as of 29 Jul 2023 06:14  Patient On (Oxygen Delivery Method): room air                I&O's Summary    27 Jul 2023 07:01  -  28 Jul 2023 07:00  --------------------------------------------------------  IN: 800 mL / OUT: 1000 mL / NET: -200 mL    28 Jul 2023 07:01  -  29 Jul 2023 06:58  --------------------------------------------------------  IN: 2600 mL / OUT: 1150 mL / NET: 1450 mL        EXAM:     [place] Coma Scale:     General: normocephalic, atraumatic, laying in bed, in no distress  Neuro     MS: A/Ox3, cooperative, normal attention, no neglect, comprehension intact, speech with preserved fluency, naming, and repetition    CN: PERRL, VF FTC, EOMI and no ptosis bilaterally, sensation intact to crude touch V1-V3, face symmetric, hearing grossly intact    Mot: bulk normal, tone normal, power 5/5 in bilateral upper and lower proximal extremities    Sens: intact to crude touch in bilateral upper and lower extremities    Reflexes: deferred    Coord: no dysmetria or ataxia on finger to nose/heel to shin, respectively, no focal bradykinetic movements    Gait: deferred  Chest: nonlabored respirations, no adventitious lung sounds bilaterally, heart regular rate/rhythm, present S1/S2, no murmurs or rubs  Abdomen: nondistended, soft and nontender without peritoneal signs, normoactive bowel sounds  Extremities: no clubbing, well-perfused, no edema    LABORATORY DATA:                            6.7    3.33  )-----------( 55       ( 29 Jul 2023 06:20 )             20.6     07-29    137  |  114<H>  |  24<H>  ----------------------------<  38<LL>  3.4<L>   |  18<L>  |  0.70    Ca    6.3<LL>      29 Jul 2023 05:38  Phos  2.7     07-29  Mg     1.8     07-29    TPro  4.2<L>  /  Alb  2.2<L>  /  TBili  2.8<H>  /  DBili  x   /  AST  34  /  ALT  32  /  AlkPhos  56  07-28    LIVER FUNCTIONS - ( 28 Jul 2023 17:00 )  Alb: 2.2 g/dL / Pro: 4.2 g/dL / ALK PHOS: 56 U/L / ALT: 32 U/L / AST: 34 U/L / GGT: x               IMAGING DATA:    CARDIOLOGY DATA:    MICROBIOLOGY DATA:      Culture - Blood (collected 28 Jul 2023 17:01)  Source: .Blood Blood-Peripheral  Preliminary Report (29 Jul 2023 06:01):    No growth at 12 hours    Culture - Blood (collected 28 Jul 2023 17:01)  Source: .Blood Blood-Venous  Gram Stain (29 Jul 2023 06:23):    Growth in anaerobic bottle: Gram Negative Rods    Result called to and read back byBAO WEIR RN  07/29/2023    06:23:39    ***Blood Panel PCR results on this specimen are available    approximately 3 hours after the Gram stain result.***    Gram stain, PCR, and/or culture results may not always    correspond due to difference in methodologies.    ************************************************************    This PCR assay was performed using [employer] BCID2 panel.    This assay tests for bacterial, fungal and resistance gene    targets.  Preliminary Report (29 Jul 2023 06:24):    Culture in progress    Urinalysis with Rflx Culture (collected 28 Jul 2023 16:16)        MEDICATIONS  (STANDING):  atorvastatin 20 milliGRAM(s) Oral at bedtime  dexAMETHasone  Injectable 4 milliGRAM(s) IV Push every 12 hours  dextrose 50% Injectable 25 milliLiter(s) IV Push once  escitalopram 10 milliGRAM(s) Oral daily  fluticasone propionate 50 MICROgram(s)/spray Nasal Spray 1 Spray(s) Both Nostrils two times a day  insulin glargine Injectable (LANTUS) 18 Unit(s) SubCutaneous at bedtime  insulin lispro (ADMELOG) corrective regimen sliding scale   SubCutaneous Before meals and at bedtime  levETIRAcetam  IVPB 1000 milliGRAM(s) IV Intermittent every 12 hours  melatonin 5 milliGRAM(s) Oral at bedtime  meropenem  IVPB      propofol Infusion 5 MICROgram(s)/kG/Min (2.18 mL/Hr) IV Continuous <Continuous>  psyllium Powder 1 Packet(s) Oral two times a day  sodium chloride 3 Gram(s) Oral every 6 hours  sodium chloride 0.9%. 1000 milliLiter(s) (75 mL/Hr) IV Continuous <Continuous>  sucralfate 1 Gram(s) Oral every 12 hours  vancomycin  IVPB 1000 milliGRAM(s) IV Intermittent every 12 hours    MEDICATIONS  (PRN):  acetaminophen     Tablet .. 650 milliGRAM(s) Oral every 6 hours PRN Temp greater or equal to 38C (100.4F), Mild Pain (1 - 3)  benzocaine/menthol Lozenge 1 Lozenge Oral every 4 hours PRN Sore Throat  ibuprofen  Tablet. 400 milliGRAM(s) Oral every 6 hours PRN Temp greater or equal to 38C (100.4F)  marijuana, medical 2 Capsule(s) Oral every 6 hours PRN Nausea  ondansetron Injectable 4 milliGRAM(s) IV Push every 6 hours PRN Nausea and/or Vomiting      ***********************************************  ASSESSMENT AND PLAN  ***********************************************    #Likely progression of L. frontoparietal GBM  #Hyponatremia secondary to suspected SIADH  #Thrombocytopenia secondary to temzar, baseline 70 to 100k  #Septic shock 2/2 Anaerobic GNR bacteremia, UTI, 7/28/23  #Staph epi bacteremia versus contaminant  #Leukopenia  #Sick euthyroid syndrome  #T2DM  #Suspected drug rash due to ?Urena  #History of depression  #History of GBM s/p resection (Dr. Dorado, 1/27/2022) and avastin  #History of tracheal stenosis s/p trach (decannulated)  #History of HLD  #History of JAGDISH    NEURO  - Admit NSICU, Q1h neuro checks / Q1h vital signs    PULM  - SpO2 goal > 92%, supplemental O2 and pulm toileting as needed    CARDIO  - BP goal:     GI  - Diet:   - Stress ulcer prophylaxis:     /RENAL  - Monitor UOP/volume status, BUN/SCr    HEME  - Maintain Hb > 7.0, PLT > ***    ID  - Monitor for infectious s/s, fever curve, leukocytosis    ENDO    07-29-23 @ 06:58       ***********************************************  ADULT NSICU PROGRESS NOTE  JORDAN CHAKRABORTY [id number] [facility] 08EA [id number]  ***********************************************    24H INTERVAL EVENTS:  - Less responsive early yesterday evening, upgraded to tele for severe sepsis  - Adequate isotonic IVF administered for fluid resuscitation; pressures remained soft, however  - Started on vanco/cipro  - RRT called for unresponsiveness, hypotension (SBP 69) this AM; patient with projectile vomiting now protecting airway; SGlu 34, lactate 2.4, BCx (+) GNR in anaerobic bottle; patient intubated w/ prop 70 succ 40 in NSICU, abx broadened to alexis/vanco, started on levo; OGT placed, 600cc dark stomach contents aspirated in trap    ROS: negative except per mentioned above in 24h interval events.    VITALS:    ICU Vital Signs Last 24 Hrs  T(C): 37.7 (29 Jul 2023 05:00), Max: 39.2 (28 Jul 2023 18:45)  T(F): 99.8 (29 Jul 2023 05:00), Max: 102.6 (28 Jul 2023 18:45)  HR: 98 (29 Jul 2023 06:20) (92 - 133)  BP: 81/47 (29 Jul 2023 06:20) (69/42 - 118/75)  BP(mean): 51 (29 Jul 2023 06:20) (50 - 80)  ABP: --  ABP(mean): --  RR: 21 (29 Jul 2023 06:16) (16 - 23)  SpO2: 94% (29 Jul 2023 06:20) (94% - 99%)    O2 Parameters below as of 29 Jul 2023 06:14  Patient On (Oxygen Delivery Method): room air                I&O's Summary    27 Jul 2023 07:01  -  28 Jul 2023 07:00  --------------------------------------------------------  IN: 800 mL / OUT: 1000 mL / NET: -200 mL    28 Jul 2023 07:01  -  29 Jul 2023 06:58  --------------------------------------------------------  IN: 2600 mL / OUT: 1150 mL / NET: 1450 mL        EXAM:     [place] Coma Scale: 1/1/1    General: normocephalic, atraumatic, laying in bed, agonal breathing  Neuro     MS: [place] Coma Scale: 1/1/1    CN: PERRL, gaze midline & conjugate, face appears symmetric    Mot: bulk normal, tone normal, no movement throughout  Chest: (+) Bilateral rhonchi, heart regular rate/rhythm, present S1/S2, no murmurs or rubs  Abdomen: nondistended, soft and nontender without peritoneal signs, normoactive bowel sounds  Extremities: no clubbing, well-perfused, no edema    LABORATORY DATA:                            6.7    3.33  )-----------( 55       ( 29 Jul 2023 06:20 )             20.6     07-29    137  |  114<H>  |  24<H>  ----------------------------<  38<LL>  3.4<L>   |  18<L>  |  0.70    Ca    6.3<LL>      29 Jul 2023 05:38  Phos  2.7     07-29  Mg     1.8     07-29    TPro  4.2<L>  /  Alb  2.2<L>  /  TBili  2.8<H>  /  DBili  x   /  AST  34  /  ALT  32  /  AlkPhos  56  07-28    LIVER FUNCTIONS - ( 28 Jul 2023 17:00 )  Alb: 2.2 g/dL / Pro: 4.2 g/dL / ALK PHOS: 56 U/L / ALT: 32 U/L / AST: 34 U/L / GGT: x               IMAGING DATA:    CARDIOLOGY DATA:    MICROBIOLOGY DATA:      Culture - Blood (collected 28 Jul 2023 17:01)  Source: .Blood Blood-Peripheral  Preliminary Report (29 Jul 2023 06:01):    No growth at 12 hours    Culture - Blood (collected 28 Jul 2023 17:01)  Source: .Blood Blood-Venous  Gram Stain (29 Jul 2023 06:23):    Growth in anaerobic bottle: Gram Negative Rods    Result called to and read back byBAO WEIR RN  07/29/2023    06:23:39    ***Blood Panel PCR results on this specimen are available    approximately 3 hours after the Gram stain result.***    Gram stain, PCR, and/or culture results may not always    correspond due to difference in methodologies.    ************************************************************    This PCR assay was performed using [employer] BCID2 panel.    This assay tests for bacterial, fungal and resistance gene    targets.  Preliminary Report (29 Jul 2023 06:24):    Culture in progress    Urinalysis with Rflx Culture (collected 28 Jul 2023 16:16)        MEDICATIONS  (STANDING):  atorvastatin 20 milliGRAM(s) Oral at bedtime  dexAMETHasone  Injectable 4 milliGRAM(s) IV Push every 12 hours  dextrose 50% Injectable 25 milliLiter(s) IV Push once  escitalopram 10 milliGRAM(s) Oral daily  fluticasone propionate 50 MICROgram(s)/spray Nasal Spray 1 Spray(s) Both Nostrils two times a day  insulin glargine Injectable (LANTUS) 18 Unit(s) SubCutaneous at bedtime  insulin lispro (ADMELOG) corrective regimen sliding scale   SubCutaneous Before meals and at bedtime  levETIRAcetam  IVPB 1000 milliGRAM(s) IV Intermittent every 12 hours  melatonin 5 milliGRAM(s) Oral at bedtime  meropenem  IVPB      propofol Infusion 5 MICROgram(s)/kG/Min (2.18 mL/Hr) IV Continuous <Continuous>  psyllium Powder 1 Packet(s) Oral two times a day  sodium chloride 3 Gram(s) Oral every 6 hours  sodium chloride 0.9%. 1000 milliLiter(s) (75 mL/Hr) IV Continuous <Continuous>  sucralfate 1 Gram(s) Oral every 12 hours  vancomycin  IVPB 1000 milliGRAM(s) IV Intermittent every 12 hours    MEDICATIONS  (PRN):  acetaminophen     Tablet .. 650 milliGRAM(s) Oral every 6 hours PRN Temp greater or equal to 38C (100.4F), Mild Pain (1 - 3)  benzocaine/menthol Lozenge 1 Lozenge Oral every 4 hours PRN Sore Throat  ibuprofen  Tablet. 400 milliGRAM(s) Oral every 6 hours PRN Temp greater or equal to 38C (100.4F)  marijuana, medical 2 Capsule(s) Oral every 6 hours PRN Nausea  ondansetron Injectable 4 milliGRAM(s) IV Push every 6 hours PRN Nausea and/or Vomiting      ***********************************************  ASSESSMENT AND PLAN  ***********************************************    #Likely progression of L. frontoparietal GBM  #Hyponatremia secondary to suspected SIADH  #Thrombocytopenia secondary to temzar, baseline 70 to 100k  #Septic shock 2/2 Anaerobic GNR bacteremia, UTI, 7/28/23  #Staph epi bacteremia versus contaminant  #Leukopenia  #Sick euthyroid syndrome  #T2DM  #Suspected drug rash due to ?Urena  #History of depression  #History of GBM s/p resection (Dr. Dorado, 1/27/2022) and avastin  #History of tracheal stenosis s/p trach (decannulated)  #History of HLD  #History of JAGDISH      MEDICATIONS  (STANDING):  atorvastatin 20 milliGRAM(s) Oral at bedtime  dexAMETHasone  Injectable 4 milliGRAM(s) IV Push every 12 hours  dextrose 50% Injectable 25 milliLiter(s) IV Push once  escitalopram 10 milliGRAM(s) Oral daily  fluticasone propionate 50 MICROgram(s)/spray Nasal Spray 1 Spray(s) Both Nostrils two times a day  insulin glargine Injectable (LANTUS) 18 Unit(s) SubCutaneous at bedtime  insulin lispro (ADMELOG) corrective regimen sliding scale   SubCutaneous Before meals and at bedtime  levETIRAcetam  IVPB 1000 milliGRAM(s) IV Intermittent every 12 hours  melatonin 5 milliGRAM(s) Oral at bedtime  meropenem  IVPB      propofol Infusion 5 MICROgram(s)/kG/Min (2.18 mL/Hr) IV Continuous <Continuous>  psyllium Powder 1 Packet(s) Oral two times a day  sodium chloride 3 Gram(s) Oral every 6 hours  sodium chloride 0.9%. 1000 milliLiter(s) (75 mL/Hr) IV Continuous <Continuous>  sucralfate 1 Gram(s) Oral every 12 hours  vancomycin  IVPB 1000 milliGRAM(s) IV Intermittent every 12 hours    MEDICATIONS  (PRN):  acetaminophen     Tablet .. 650 milliGRAM(s) Oral every 6 hours PRN Temp greater or equal to 38C (100.4F), Mild Pain (1 - 3)  benzocaine/menthol Lozenge 1 Lozenge Oral every 4 hours PRN Sore Throat  ibuprofen  Tablet. 400 milliGRAM(s) Oral every 6 hours PRN Temp greater or equal to 38C (100.4F)  marijuana, medical 2 Capsule(s) Oral every 6 hours PRN Nausea  ondansetron Injectable 4 milliGRAM(s) IV Push every 6 hours PRN Nausea and/or Vomiting        NEURO  - Admit NSICU, Q4h neuro checks / Q1h vital signs  - Taper off dex, LEV, sedation with precedex, RASS 0 to -1  - PT/OT when able, pain control, marijuana, citalopram    PULM  - Intuabted 7/29/23 AC/VC 14/400/100%/+6  - ABG, CXR  - SpO2 goal > 92%, supplemental O2 and pulm toileting as needed    CARDIO  - BP goal: < 160, MAP > 65  - Levophed as needed for shock  - Will need TTE, continuous tele monitoring    GI  - Diet: NPO  - OGT to LIWS  - Check FOBT stomach aspirate  - Stress ulcer prophylaxis: carafate  - Bowel regimen, stool count    /RENAL  - Salt tabs  - Monitor UOP/volume status, BUN/SCr    HEME  - Maintain Hb > 7.0, PLT > 10/20 if febrile, 50k if bleeding, 100k if life threatening bleeding    ID  - Vanco/meropenem, follow up C/S  - Monitor for infectious s/s, fever curve, leukocytosis    ENDO  - Lantus, RASSI, SGlu < 200    07-29-23 @ 06:58

## 2023-07-29 NOTE — PROGRESS NOTE ADULT - SUBJECTIVE AND OBJECTIVE BOX
INFECTIOUS DISEASES CONSULT FOLLOW-UP NOTE    INTERVAL HPI/OVERNIGHT EVENTS:  Re-consulted for new fever  Patient was last seen by ID on 7/19.  Patient was doing fine on the floor, then he spiked 102.6 fever overnight  RRT called, had billous emesis and reportedly melena as well, intubated for airway protection   now in septic shock on levo  pan cultured, UA neg, BCx growing K.pneumo  WBC 3.3 Hgb 6.7, Plt 54, Cr 0.7, FOBT positive  vanc/alexis started and ID consulted for abx rec for management of septic shock   per RN, patient has secretion fro ET tube       ROS:   unable to obtain       ANTIBIOTICS/RELEVANT:    MEDICATIONS  (STANDING):  atorvastatin 20 milliGRAM(s) Oral at bedtime  chlorhexidine 0.12% Liquid 15 milliLiter(s) Oral Mucosa every 12 hours  dexAMETHasone  Injectable 2 milliGRAM(s) IV Push every 12 hours  dexMEDEtomidine Infusion 0.2 MICROgram(s)/kG/Hr (3.63 mL/Hr) IV Continuous <Continuous>  escitalopram 5 milliGRAM(s) Oral daily  fluticasone propionate 50 MICROgram(s)/spray Nasal Spray 1 Spray(s) Both Nostrils two times a day  insulin lispro (ADMELOG) corrective regimen sliding scale   SubCutaneous Before meals and at bedtime  lacosamide IVPB 50 milliGRAM(s) IV Intermittent every 12 hours  meropenem  IVPB 1000 milliGRAM(s) IV Intermittent every 8 hours  norepinephrine Infusion 0.05 MICROgram(s)/kG/Min (3.4 mL/Hr) IV Continuous <Continuous>  propofol Infusion 20 MICROgram(s)/kG/Min (8.71 mL/Hr) IV Continuous <Continuous>  propofol Infusion 5 MICROgram(s)/kG/Min (2.18 mL/Hr) IV Continuous <Continuous>  psyllium Powder 1 Packet(s) Oral two times a day  sodium chloride 3 Gram(s) Oral every 6 hours  sodium chloride 0.9%. 1000 milliLiter(s) (75 mL/Hr) IV Continuous <Continuous>  sucralfate 1 Gram(s) Oral every 6 hours    MEDICATIONS  (PRN):  acetaminophen     Tablet .. 650 milliGRAM(s) Oral every 6 hours PRN Temp greater or equal to 38C (100.4F), Mild Pain (1 - 3)  benzocaine/menthol Lozenge 1 Lozenge Oral every 4 hours PRN Sore Throat  fentaNYL    Injectable 25 MICROGram(s) IV Push every 2 hours PRN vent dysynchrony  marijuana, medical 2 Capsule(s) Oral every 6 hours PRN Nausea  ondansetron Injectable 4 milliGRAM(s) IV Push every 6 hours PRN Nausea and/or Vomiting        Vital Signs Last 24 Hrs  T(C): 36.4 (29 Jul 2023 14:54), Max: 39.2 (28 Jul 2023 18:45)  T(F): 97.5 (29 Jul 2023 14:54), Max: 102.6 (28 Jul 2023 18:45)  HR: 100 (29 Jul 2023 15:00) (92 - 133)  BP: 107/55 (29 Jul 2023 15:00) (69/42 - 168/67)  BP(mean): 73 (29 Jul 2023 15:00) (50 - 108)  RR: 16 (29 Jul 2023 15:00) (15 - 23)  SpO2: 100% (29 Jul 2023 15:00) (94% - 100%)    Parameters below as of 29 Jul 2023 15:00  Patient On (Oxygen Delivery Method): ventilator, VC-AC    O2 Concentration (%): 45    07-28-23 @ 07:01 - 07-29-23 @ 07:00  --------------------------------------------------------  IN: 2609 mL / OUT: 1150 mL / NET: 1459 mL    07-29-23 @ 07:01 - 07-29-23 @ 16:24  --------------------------------------------------------  IN: 1020.2 mL / OUT: 550 mL / NET: 470.2 mL      PHYSICAL EXAM:  Constitutional: intubated and sedated   ENT: the ears and nose were normal in appearance.   Neck: the appearance of the neck was normal and the neck was supple.  ET tube   Pulmonary: no respiratory distress and lungs were clear to auscultation bilaterally.   Heart: heart rate was normal and rhythm regular, normal S1 and S2  Abdomen: normal bowel sounds, soft, non-tender        LABS:                        6.7    3.33  )-----------( 55       ( 29 Jul 2023 06:20 )             20.6     07-29    135  |  110<H>  |  26<H>  ----------------------------<  34<LL>  3.4<L>   |  19<L>  |  0.71    Ca    6.1<LL>      29 Jul 2023 06:20  Phos  3.0     07-29  Mg     1.8     07-29    TPro  4.2<L>  /  Alb  2.2<L>  /  TBili  2.8<H>  /  DBili  x   /  AST  34  /  ALT  32  /  AlkPhos  56  07-28      Urinalysis Basic - ( 29 Jul 2023 06:20 )    Color: x / Appearance: x / SG: x / pH: x  Gluc: 34 mg/dL / Ketone: x  / Bili: x / Urobili: x   Blood: x / Protein: x / Nitrite: x   Leuk Esterase: x / RBC: x / WBC x   Sq Epi: x / Non Sq Epi: x / Bacteria: x        MICROBIOLOGY:  7/28 BCx K.pneumo  7/28 BCx ngtd  7/28 UA neg     RADIOLOGY & ADDITIONAL STUDIES:  CXR 7/29  IMPRESSION:  Endotracheal tube terminates approximately 4.5 cm above the gerald.   Otherwise, no significant interval change. No acute infiltrates

## 2023-07-29 NOTE — PROVIDER CONTACT NOTE (CHANGE IN STATUS NOTIFICATION) - BACKGROUND
Pt up graded from 8W at approximately 2330 7/28/23 for rule out sepsis. Pt up graded from 8W at approximately 2330 7/28/23 for rule out sepsis. received 2x NS 1000 ml Bolus at 0000 & 0400

## 2023-07-29 NOTE — PROVIDER CONTACT NOTE (CHANGE IN STATUS NOTIFICATION) - SITUATION
Pt mental status declined from baseline, vomited dark green fluid. required suctioning. BP low. Pt mental status declined from baseline, vomited dark green fluid. required suctioning. BP low. critical value Hgb 6.8 reported at 0538 to [place]

## 2023-07-29 NOTE — CHART NOTE - NSCHARTNOTEFT_GEN_A_CORE
In AM patient had episode of emesis and was unresponsive to sternal rub. Dr. Huggins, neurointensivist at bedside. Repeat BP 60s/40s, tachycardic to 110s. Rapid response called. Levo gtt started. Upgraded to ICU. Intubated for airway protection. Likely septic shock given GNR in blood cx growing K.pneumoniae, Given additional 1L bolus x2. Abx changed to vanc and meropenem, ID following, rec to d/c vanc, c/w meropenem 1qo0hpi, FOBT positive, keppra changed to vimpat 50BID, fentanyl 25mg q4hrs prn for vent dysynchrony, home lexapro decreased to 5mg, decadron decreased to 2q12, sucralfate increased to 1q6 iso GI bleed, pending repeat CBC, CMP, ABG, DIC labs, xray abdomen to r/o obstruction, propofol gtt started for sedation, weaning off precedex gtt, GI consulted rec protonix 40bid iso possible GI bleed, plan for possible EGD, albumin 1.7, 25% albumin 50ml given, s/p 1u PRBC and PLT repeat cbc hgb 8.0 from 6.6 and PLT 88 from 55

## 2023-07-29 NOTE — PROGRESS NOTE ADULT - ASSESSMENT
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  He was medically managed.  Course c/b septic shock due to K.pneumo bacteremia, patient has +FOBT and billous vomiting, suspect due to gut translocation and GI source.    - cont meropenem 1g IV q8h  - ok to stop vanco  - send sputum culture  - f/u K.pneumo susceptibility  - send surveillance BCx   - agree with CT c/a/p to look for the source   - suggest GI consult for work-up for GIB      Team 2 will follow you.  Case d/w primary team.    Milagor Luevano MD, MS  Infectious Disease attending  work cell [phone]   For any questions during evening/weekend/holiday, please page ID on call  68M h/o GBM s/p resection 1/27/2022 and Avastin 3/2023, T2DM, HLD, trachal stenosis s/p tracheostomy which was closed p/w AMS, found to have severe hyponatremia.  He was medically managed.  Course c/b septic shock due to K.pneumo bacteremia, patient has +FOBT, Hgb drop from 9 to 6.7, and billous vomiting, suspect due to gut translocation in setting of GIB.    - cont meropenem 1g IV q8h  - ok to stop vanco  - send sputum culture  - f/u K.pneumo susceptibility  - send surveillance BCx   - agree with CT c/a/p   - suggest GI consult for work-up for GIB      Team 2 will follow you.  Case d/w primary team.    Milagro Luevano MD, MS  Infectious Disease attending  work cell [phone]   For any questions during evening/weekend/holiday, please page ID on call

## 2023-07-29 NOTE — PROVIDER CONTACT NOTE (CRITICAL VALUE NOTIFICATION) - BACKGROUND
Pt upgraded to 8L from 8W at 0000 for rule out sepsis. Pt received 2 x 1000 nl NS 0000 & 0400 BP remained low, patient then vomited dark green liquid and required oral suctioning, non responsive verbally. rapid called at 0615 see note
Patient is s/p stroke code upgrade
Pt amitted with AMS, was a rapid and stroke code yesterday, lactate was previously elevated and improved with fluids, now lactate trending back up.
hyponatremia, brain mass, DM II, tracheal stenosis s/p trach

## 2023-07-29 NOTE — CONSULT NOTE ADULT - ASSESSMENT
PENDING 68M h/o GBM s/p resection 1/27/2022 and Avastin 3/2023 trachal stenosis s/p tracheostomy which is now closed admitted with symptomatic hyponatremia with AMS, now improved, with hospital course c/b septic shock due to K.pneumo bacteremia with concern for GIB    Hemodynamically stable at bedside, though requiring levophed to maintain MAPs.   Coffee ground/dark bilious emesis noted from OG tube this AM but has since stopped.  Elevated BUN:Cr which has now normalized. Rectal exam is notable for light brown stool without visible blood. Has responded appropriately to 1 unit PRBC this AM which is reassuring    Although occult blood noted to be positive, the appropriate response to hgb without clinical rectal bleeding is quite reassuring against active, continued GI bleeding. Sepsis 2/2 GNR bacteremia is favored to be the primary cause of shock rather than hemorrhagic.  Rectal bleeding would be expected should GIB be the cause of hemodynamic instability.    The gastric contents from OG which likely represent old blood may be due to diffuse ischemic injury i/s/o septic shock and hemodynamic instability.  Reassuringly, this seems to have resolved as supported by minimal additional OG output and normalization of BUN:Cr without rectal bleeding.     On ddx for K. pneumo bacteremia may be GI tract i/s/o GIB, however medical mgmt and resuscitation is recommended at this time in the absence of signs/sx to suggest continued active bleeding with appropriate pRBC transfusion response    Recommendations:  -Continue pantoprazole 40 mg IVP q12hrs  -OK to continue carafate via OG  -Keep NPO otherwise  -Trend Hgb. Maintain active T&S  -Large bore PIV x2.  Fluid resuscitation and transfuse as indicated per primary  -Will re-evaluate in AM with respect to EGD pending response to medical management/optimization  -Mgmt of sepsis per ID and primary team    Pt seen and evaluated at bedside with c attending, Dr. Hannah Hernandez, DO  Gastroenterology Fellow  Pager: [phone]  After 5PM or on weekends, please contact [place]  for fellow on call   68M h/o GBM s/p resection 1/27/2022 and Avastin 3/2023 trachal stenosis s/p tracheostomy which is now closed admitted with symptomatic hyponatremia with AMS, now improved, with hospital course c/b septic shock due to K.pneumo bacteremia with concern for GIB    Hemodynamically stable at bedside, though requiring levophed to maintain MAPs.   Coffee ground/dark bilious emesis noted from OG tube this AM but has since stopped.  Elevated BUN:Cr which has now normalized. Rectal exam is notable for light brown stool without visible blood. Has responded appropriately to 1 unit PRBC this AM which is reassuring    Although occult blood noted to be positive, the appropriate response to hgb without clinical rectal bleeding is quite reassuring against active, continued GI bleeding. Sepsis 2/2 GNR bacteremia is favored to be the primary cause of shock rather than hemorrhagic.  Rectal bleeding would be expected should GIB be the cause of hemodynamic instability.    The gastric contents from OG which likely represent old blood may be due to diffuse ischemic injury i/s/o septic shock and hemodynamic instability.  Reassuringly, this seems to have resolved as supported by minimal additional OG output and normalization of BUN:Cr without rectal bleeding.     On ddx for K. pneumo bacteremia may be GI tract i/s/o GIB, however medical mgmt and resuscitation is recommended at this time in the absence of signs/sx to suggest continued active bleeding with appropriate pRBC transfusion response    Recommendations:  -Please start pantoprazole gtt with 80 mg bolus, followed by 8 mg/hour for aggressive medical mgmt  -OK to continue carafate via OG  -Keep NPO otherwise  -Trend Hgb. Maintain active T&S  -Large bore PIV x2.  Fluid resuscitation and transfuse as indicated per primary  -Will re-evaluate in AM with respect to EGD pending response to medical management/optimization  -Mgmt of sepsis per ID and primary team    Pt seen and evaluated at bedside with [facility] attending, Dr. Hannah Hernandez,   Gastroenterology Fellow  Pager: [phone]  After 5PM or on weekends, please contact [place]  for fellow on call

## 2023-07-29 NOTE — PROVIDER CONTACT NOTE (CHANGE IN STATUS NOTIFICATION) - ASSESSMENT
Pt non responsive verbally, not Pt non-responsive verbally, unable to follow commands, VS BP 71/43 HR 94 RR 23 O2 sat 94% hgb 6.8 at 0538

## 2023-07-30 NOTE — PROGRESS NOTE ADULT - SUBJECTIVE AND OBJECTIVE BOX
INTERVAL HISTORY: HPI:  Osiel Norwood is a 67YO male with a past medical history of type 2 diabetes, hyperlipidemia, iron deficiency anemia, tracheal stenosis s/p tracheostomy (which was closed by ENT 7 days ago); glioblastoma s/p resection 1/27/2022, and Avastin treatment 3/2023 presenting with altered mental status and weakness x2 days. Per family, they last saw him last night around 6pm when he was at baseline. Patient has intermittent expressive aphasia but is getting worse and now is persistent, and has intermittent nausea and vomiting since last night. Patient was supposed to receive an outpatient brain MRI on 7/25/2023.  Stroke code performed in  is negative for CVA. Head CT shows new hyperdensity in frontal parietal - surgical site. MRI is ordered to rule out tumor recurrence.   Per daughter at bedside, Entresto and Eliquis were stopped per his PC - cardiologist.      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  Type 2 diabetes mellitus  Hyperlipidemia  Iron deficiency anemia  Nephrolithiasis  Thrombocytopenia  Hyponatremia  BPH (benign prostatic hyperplasia)  S/P craniotomy  H/O tracheostomy      REVIEW OF SYSTEMS: [x] Unable to Assess due to neurologic exam   [ ] All ROS addressed below are non-contributory, except:  Neuro: [ ] Headache [ ] Back pain [ ] Numbness [ ] Weakness [ ] Ataxia [ ] Dizziness [ ] Aphasia [ ] Dysarthria [ ] Visual disturbance  Resp: [ ] Shortness of breath/dyspnea, [ ] Orthopnea [ ] Cough  CV: [ ] Chest pain [ ] Palpitation [ ] Lightheadedness [ ] Syncope  Renal: [ ] Thirst [ ] Edema  GI: [ ] Nausea [ ] Emesis [ ] Abdominal pain [ ] Constipation [ ] Diarrhea  Hem: [ ] Hematemesis [ ] bright red blood per rectum  ID: [ ] Fever [ ] Chills [ ] Dysuria  ENT: [ ] Rhinorrhea    PHYSICAL EXAM:    General: No Acute Distress, intubated   Neurological: grimace to pain, not following commands, eyes not open to nox, pupils 3mm reactive & midline b/l, WD all extremities to nos, +cough/+gag,   Pulmonary: Clear to Auscultation, No Rales, No Rhonchi, No Wheezes   Cardiovascular: S1, S2, Regular Rate and Rhythm   Gastrointestinal: Soft, Nontender, Nondistended   Extremities: No edema       ICU Vital Signs Last 24 Hrs  T(C): 38.2 (30 Jul 2023 08:53), Max: 39.3 (29 Jul 2023 21:00)  T(F): 100.7 (30 Jul 2023 08:53), Max: 102.8 (29 Jul 2023 21:00)  HR: 97 (30 Jul 2023 08:40) (88 - 107)  BP: 120/57 (30 Jul 2023 08:00) (81/44 - 137/62)  BP(mean): 82 (30 Jul 2023 08:00) (58 - 89)  ABP: 139/54 (30 Jul 2023 08:00) (99/47 - 145/51)  ABP(mean): 79 (30 Jul 2023 08:00) (62 - 79)  RR: 18 (30 Jul 2023 08:00) (16 - 19)  SpO2: 100% (30 Jul 2023 08:40) (98% - 100%)      07-29-23 @ 07:01  -  07-30-23 @ 07:00  --------------------------------------------------------  IN: 6002.7 mL / OUT: 4045 mL / NET: 1957.7 mL    07-30-23 @ 07:01  -  07-30-23 @ 09:25  --------------------------------------------------------  IN: 179.1 mL / OUT: 150 mL / NET: 29.1 mL        Mode: AC/ CMV (Assist Control/ Continuous Mandatory Ventilation), RR (machine): 14, TV (machine): 400, FiO2: 40, PEEP: 5, ITime: 1, MAP: 8, PIP: 15    acetaminophen     Tablet .. 650 milliGRAM(s) Oral every 6 hours PRN  atorvastatin 20 milliGRAM(s) Oral at bedtime  chlorhexidine 0.12% Liquid 15 milliLiter(s) Oral Mucosa every 12 hours  chlorhexidine 2% Cloths 1 Application(s) Topical <User Schedule>  dexAMETHasone  Injectable 2 milliGRAM(s) IV Push every 12 hours  dexMEDEtomidine Infusion 0.2 MICROgram(s)/kG/Hr (3.63 mL/Hr) IV Continuous <Continuous>  escitalopram 5 milliGRAM(s) Oral daily  fentaNYL    Injectable 25 MICROGram(s) IV Push every 2 hours PRN  fluticasone propionate 50 MICROgram(s)/spray Nasal Spray 1 Spray(s) Both Nostrils two times a day  insulin lispro (ADMELOG) corrective regimen sliding scale   SubCutaneous Before meals and at bedtime  lacosamide IVPB 50 milliGRAM(s) IV Intermittent every 12 hours  marijuana, medical 2 Capsule(s) Oral every 6 hours PRN  meropenem  IVPB 1000 milliGRAM(s) IV Intermittent every 8 hours  multivitamin 1 Tablet(s) Oral daily  norepinephrine Infusion 0.05 MICROgram(s)/kG/Min (3.4 mL/Hr) IV Continuous <Continuous>  ondansetron Injectable 4 milliGRAM(s) IV Push every 6 hours PRN  pantoprazole Infusion 8 mG/Hr (10 mL/Hr) IV Continuous <Continuous>  potassium chloride  20 mEq/100 mL IVPB 20 milliEquivalent(s) IV Intermittent every 2 hours  propofol Infusion 20 MICROgram(s)/kG/Min (8.71 mL/Hr) IV Continuous <Continuous>  propofol Infusion 5 MICROgram(s)/kG/Min (2.18 mL/Hr) IV Continuous <Continuous>  psyllium Powder 1 Packet(s) Oral two times a day  sodium chloride 3 Gram(s) Oral every 6 hours  sodium chloride 0.9%. 1000 milliLiter(s) (75 mL/Hr) IV Continuous <Continuous>  sucralfate 1 Gram(s) Oral every 6 hours      LABS:  Na: 135 (07-30 @ 06:28), 137 (07-30 @ 01:16), 136 (07-29 @ 15:10), 135 (07-29 @ 06:20), 137 (07-29 @ 05:38), 134 (07-28 @ 17:00), 132 (07-28 @ 07:27)  K: 3.3 (07-30 @ 06:28), 3.4 (07-30 @ 01:16), 4.1 (07-29 @ 15:10), 3.4 (07-29 @ 06:20), 3.4 (07-29 @ 05:38), 4.3 (07-28 @ 17:00), 4.4 (07-28 @ 07:27)  Cl: 110 (07-30 @ 06:28), 111 (07-30 @ 01:16), 112 (07-29 @ 15:10), 110 (07-29 @ 06:20), 114 (07-29 @ 05:38), 104 (07-28 @ 17:00), 101 (07-28 @ 07:27)  CO2: 16 (07-30 @ 06:28), 17 (07-30 @ 01:16), 16 (07-29 @ 15:10), 19 (07-29 @ 06:20), 18 (07-29 @ 05:38), 19 (07-28 @ 17:00), 21 (07-28 @ 07:27)  BUN: 18 (07-30 @ 06:28), 18 (07-30 @ 01:16), 23 (07-29 @ 15:10), 26 (07-29 @ 06:20), 24 (07-29 @ 05:38), 21 (07-28 @ 17:00), 21 (07-28 @ 07:27)  Cr: 0.66 (07-30 @ 06:28), 0.66 (07-30 @ 01:16), 0.67 (07-29 @ 15:10), 0.71 (07-29 @ 06:20), 0.70 (07-29 @ 05:38), 0.68 (07-28 @ 17:00), 0.60 (07-28 @ 07:27)  Glu: 217(07-30 @ 06:28), 183(07-30 @ 01:16), 133(07-29 @ 15:10), 34(07-29 @ 06:20), 38(07-29 @ 05:38), 217(07-28 @ 17:00), 172(07-28 @ 07:27)    Hgb: 7.1 (07-30 @ 06:28), 6.5 (07-30 @ 01:16), 8.0 (07-29 @ 15:01), 6.7 (07-29 @ 06:20), 6.8 (07-29 @ 05:38), 8.4 (07-28 @ 17:00), 8.9 (07-28 @ 07:27)  Hct: 21.2 (07-30 @ 06:28), 19.4 (07-30 @ 01:16), 23.3 (07-29 @ 15:01), 20.6 (07-29 @ 06:20), 21.2 (07-29 @ 05:38), 25.2 (07-28 @ 17:00), 27.9 (07-28 @ 07:27)  WBC: 5.02 (07-30 @ 06:28), 6.26 (07-30 @ 01:16), 6.50 (07-29 @ 15:01), 3.33 (07-29 @ 06:20), 3.94 (07-29 @ 05:38), 5.16 (07-28 @ 17:00), 4.87 (07-28 @ 07:27)  Plt: 81 (07-30 @ 06:28), 47 (07-30 @ 01:16), 88 (07-29 @ 15:01), 55 (07-29 @ 06:20), 54 (07-29 @ 05:38), 87 (07-28 @ 17:00), 68 (07-28 @ 07:27)    INR: 1.26 07-29-23 @ 15:34  PTT: 27.4 07-29-23 @ 15:34    LIVER FUNCTIONS - ( 30 Jul 2023 01:16 )  Alb: 1.8 g/dL / Pro: 3.7 g/dL / ALK PHOS: 57 U/L / ALT: 22 U/L / AST: 31 U/L / GGT: x           ABG - ( 29 Jul 2023 16:51 )  pH, Arterial: 7.54  pH, Blood: x     /  pCO2: 19    /  pO2: 208   / HCO3: 16    / Base Excess: -3.9  /  SaO2: 99.0

## 2023-07-30 NOTE — PROGRESS NOTE ADULT - SUBJECTIVE AND OBJECTIVE BOX
Nephrology progress note    Seen at bedside. Intubated, sedated. On IV pressor. Fever noted. UOP 3145cc/24 hrs. Campuzano with cloudy urine. Received 1u PRBC. Blood culture positive.    Allergies:  amoxicillin (Rash)    Hospital Medications:   MEDICATIONS  (STANDING):  atorvastatin 20 milliGRAM(s) Oral at bedtime  chlorhexidine 0.12% Liquid 15 milliLiter(s) Oral Mucosa every 12 hours  chlorhexidine 2% Cloths 1 Application(s) Topical <User Schedule>  dexAMETHasone  Injectable 2 milliGRAM(s) IV Push every 12 hours  escitalopram 5 milliGRAM(s) Oral daily  fluticasone propionate 50 MICROgram(s)/spray Nasal Spray 1 Spray(s) Both Nostrils two times a day  insulin lispro (ADMELOG) corrective regimen sliding scale   SubCutaneous Before meals and at bedtime  lacosamide IVPB 50 milliGRAM(s) IV Intermittent every 12 hours  meropenem  IVPB 1000 milliGRAM(s) IV Intermittent every 8 hours  multivitamin 1 Tablet(s) Oral daily  norepinephrine Infusion 0.05 MICROgram(s)/kG/Min (3.4 mL/Hr) IV Continuous <Continuous>  pantoprazole Infusion 8 mG/Hr (10 mL/Hr) IV Continuous <Continuous>  propofol Infusion 20 MICROgram(s)/kG/Min (8.71 mL/Hr) IV Continuous <Continuous>  psyllium Powder 1 Packet(s) Oral two times a day  sodium chloride 3 Gram(s) Oral every 6 hours  sodium chloride 0.9%. 1000 milliLiter(s) (120 mL/Hr) IV Continuous <Continuous>  sucralfate 1 Gram(s) Oral every 6 hours    REVIEW OF SYSTEMS:  Review of systems is negative unless indicated above.    VITALS:  T(F): 100.7 (07-30-23 @ 08:53), Max: 102.8 (07-29-23 @ 21:00)  HR: 105 (07-30-23 @ 12:00)  BP: 108/59 (07-30-23 @ 12:00)  RR: 18 (07-30-23 @ 08:00)  SpO2: 99% (07-30-23 @ 12:00)  Wt(kg): --    07-28 @ 07:01  -  07-29 @ 07:00  --------------------------------------------------------  IN: 2609 mL / OUT: 1150 mL / NET: 1459 mL    07-29 @ 07:01 - 07-30 @ 07:00  --------------------------------------------------------  IN: 6002.7 mL / OUT: 4045 mL / NET: 1957.7 mL    07-30 @ 07:01 - 07-30 @ 12:03  --------------------------------------------------------  IN: 179.1 mL / OUT: 150 mL / NET: 29.1 mL        PHYSICAL EXAM:  Constitutional: Ill-appearing in ICU, intubated and sedated  HEENT: NCAT, ETT in place  Respiratory: CTAB, mechanical breath sounds  Cardiovascular: RRR, no murmur appreciated  Gastrointestinal: soft, non-tender, non-distended  Extremities: No peripheral edema  Neurological: Sedated  : Campuzano in place with cloudy urine  Skin: No rashes on exposed skin  Vascular Access: [facility]    LABS:  07-30    135  |  110<H>  |  18  ----------------------------<  217<H>  3.3<L>   |  16<L>  |  0.66    Ca    6.7<L>      30 Jul 2023 06:28  Phos  2.4     07-30  Mg     1.6     07-30    TPro  3.7<L>  /  Alb  1.8<L>  /  TBili  3.0<H>  /  DBili  1.5<H>  /  AST  31  /  ALT  22  /  AlkPhos  57  07-30                          7.2    5.49  )-----------( 74       ( 30 Jul 2023 09:58 )             21.5       Urine Studies:  Creatinine Trend: 0.66<--, 0.66<--, 0.67<--, 0.71<--, 0.70<--, 0.68<--  Urinalysis Basic - ( 30 Jul 2023 10:52 )    Color: Yellow / Appearance: Hazy / SG: >=1.030 / pH:   Gluc:  / Ketone: 15 mg/dL  / Bili: Small / Urobili: 0.2 E.U./dL   Blood:  / Protein: 30 mg/dL / Nitrite: NEGATIVE   Leuk Esterase: NEGATIVE / RBC: > 10 /HPF / WBC < 5 /HPF   Sq Epi:  / Non Sq Epi:  / Bacteria: None /HPF      Creatinine, Random Urine: 26 mg/dL (07-30 @ 10:52)  Sodium, Random Urine: 125 mmol/L (07-30 @ 10:52)    RADIOLOGY & ADDITIONAL STUDIES:  Reviewed

## 2023-07-30 NOTE — PROGRESS NOTE ADULT - ASSESSMENT
68Y M with PMH of glioblastoma and chronic hyponatremia found to have acute on chronic symptomatic hyponatremia with Na of 115. Na has improved now to 130-135 on salt tabs. Had emesis and change in mentation prompting intubation and upgrade to ICU on 7/29 with concern for GI bleed and bacteremia.    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 Na has improved now to 130-135 on salt tabs. Had emesis and change in mentation prompting intubation and upgrade to ICU on 7/29 with concern for GI bleed and bacteremia.    Suspect septic ATN in setting of bacteremia  - UA drawn on 7/29 with tameka hematuria and significant proteinuria, patient febrile and blood cultures are positive for klebsiella  - Repeat UA with microscopic hematuria, P/Cr .77  - Suleman 125  - Would expect creatinine to rise in 24-48 hrs  - Monitor BMP, avoid nephrotoxic meds as able    Chronic hyponatremia, suspect secondary to SIADH with intracranial neoplasm - resolved  - Patient currently receiving NS, can hold salt tabs while fluids are running  - Monitor Na daily, has generally been between 135-140 on prior labs    Normal anion gap metabolic acidosis  Hypokalemia  - Supplement K

## 2023-07-30 NOTE — PROGRESS NOTE ADULT - ASSESSMENT
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  He was medically managed.  Course c/b septic shock due to K.pneumo bacteremia, patient has +FOBT, Hgb drop from 9 to 6.7, and billous vomiting, suspect due to gut translocation in setting of GIB.    - cont meropenem 1g IV q8h  - send sputum culture  - f/u K.pneumo susceptibility  - send surveillance BCx   - agree with CT c/a/p when patient able       Team 2 will follow you.  Case d/w primary team.    Milagro Luevano MD, MS  Infectious Disease attending  work cell [phone]   For any questions during evening/weekend/holiday, please page ID on call

## 2023-07-30 NOTE — CHART NOTE - NSCHARTNOTEFT_GEN_A_CORE
Hemoglobin 6.5, platelets 47 o/n, transfused 1uPRBC, 1u platelets in AM. Repeat Hb 7.1 and platelets 81. CBC repeated and Hb 7.2 and platelets 74. Arnie test negative. 1 set of surveillance blood cultures sent. NS increased to 120cc/hr. Holding off on CT C/A/P per nephrology recs due to concern about IV contrast. Temp 100.8 F, given tylenol.

## 2023-07-30 NOTE — PROGRESS NOTE ADULT - SUBJECTIVE AND OBJECTIVE BOX
INFECTIOUS DISEASES CONSULT FOLLOW-UP NOTE    INTERVAL HPI/OVERNIGHT EVENTS:  febrile to 102.8  on levo, remains intubated and sedated     ROS:   unable to obtain       ANTIBIOTICS/RELEVANT:    MEDICATIONS  (STANDING):  atorvastatin 20 milliGRAM(s) Oral at bedtime  chlorhexidine 0.12% Liquid 15 milliLiter(s) Oral Mucosa every 12 hours  chlorhexidine 2% Cloths 1 Application(s) Topical <User Schedule>  dexAMETHasone  Injectable 2 milliGRAM(s) IV Push every 12 hours  escitalopram 5 milliGRAM(s) Oral daily  fluticasone propionate 50 MICROgram(s)/spray Nasal Spray 1 Spray(s) Both Nostrils two times a day  insulin lispro (ADMELOG) corrective regimen sliding scale   SubCutaneous Before meals and at bedtime  lacosamide IVPB 50 milliGRAM(s) IV Intermittent every 12 hours  meropenem  IVPB 1000 milliGRAM(s) IV Intermittent every 8 hours  multivitamin 1 Tablet(s) Oral daily  norepinephrine Infusion 0.05 MICROgram(s)/kG/Min (3.4 mL/Hr) IV Continuous <Continuous>  pantoprazole Infusion 8 mG/Hr (10 mL/Hr) IV Continuous <Continuous>  propofol Infusion 20 MICROgram(s)/kG/Min (8.71 mL/Hr) IV Continuous <Continuous>  psyllium Powder 1 Packet(s) Oral two times a day  sodium chloride 3 Gram(s) Oral every 6 hours  sodium chloride 0.9%. 1000 milliLiter(s) (120 mL/Hr) IV Continuous <Continuous>  sucralfate 1 Gram(s) Oral every 6 hours    MEDICATIONS  (PRN):  acetaminophen     Tablet .. 650 milliGRAM(s) Oral every 6 hours PRN Temp greater or equal to 38C (100.4F), Mild Pain (1 - 3)  fentaNYL    Injectable 25 MICROGram(s) IV Push every 2 hours PRN vent dysynchrony  marijuana, medical 2 Capsule(s) Oral every 6 hours PRN Nausea  ondansetron Injectable 4 milliGRAM(s) IV Push every 6 hours PRN Nausea and/or Vomiting        Vital Signs Last 24 Hrs  T(C): 37.4 (30 Jul 2023 12:00), Max: 39.3 (29 Jul 2023 21:00)  T(F): 99.4 (30 Jul 2023 12:00), Max: 102.8 (29 Jul 2023 21:00)  HR: 113 (30 Jul 2023 13:00) (88 - 113)  BP: 118/65 (30 Jul 2023 13:00) (98/56 - 137/62)  BP(mean): 86 (30 Jul 2023 13:00) (72 - 89)  RR: 18 (30 Jul 2023 08:00) (16 - 18)  SpO2: 99% (30 Jul 2023 13:00) (98% - 100%)    Parameters below as of 30 Jul 2023 13:00  Patient On (Oxygen Delivery Method): ventilator    O2 Concentration (%): 40    07-29-23 @ 07:01  -  07-30-23 @ 07:00  --------------------------------------------------------  IN: 6002.7 mL / OUT: 4045 mL / NET: 1957.7 mL    07-30-23 @ 07:01  -  07-30-23 @ 13:22  --------------------------------------------------------  IN: 882.1 mL / OUT: 1215 mL / NET: -332.9 mL      PHYSICAL EXAM:  Constitutional: intubated and sedated  Eyes: the sclera and conjunctiva were normal.   Neck: the appearance of the neck was normal and the neck was supple.  ET tube in place  Pulmonary: no respiratory distress and lungs were clear to auscultation bilaterally.   Heart: heart rate was normal and rhythm regular, normal S1 and S2  Abdomen: normal bowel sounds, soft, non-tender          LABS:                        7.2    5.49  )-----------( 74       ( 30 Jul 2023 09:58 )             21.5     07-30    135  |  110<H>  |  18  ----------------------------<  217<H>  3.3<L>   |  16<L>  |  0.66    Ca    6.7<L>      30 Jul 2023 06:28  Phos  2.4     07-30  Mg     1.6     07-30    TPro  3.7<L>  /  Alb  1.8<L>  /  TBili  3.0<H>  /  DBili  1.5<H>  /  AST  31  /  ALT  22  /  AlkPhos  57  07-30    PT/INR - ( 29 Jul 2023 15:34 )   PT: 14.3 sec;   INR: 1.26          PTT - ( 29 Jul 2023 15:34 )  PTT:27.4 sec  Urinalysis Basic - ( 30 Jul 2023 10:52 )    Color: Yellow / Appearance: Hazy / SG: >=1.030 / pH: x  Gluc: x / Ketone: 15 mg/dL  / Bili: Small / Urobili: 0.2 E.U./dL   Blood: x / Protein: 30 mg/dL / Nitrite: NEGATIVE   Leuk Esterase: NEGATIVE / RBC: > 10 /HPF / WBC < 5 /HPF   Sq Epi: x / Non Sq Epi: x / Bacteria: None /HPF        MICROBIOLOGY:  7/28 BCx K.pneumo  7/28 BCx ngtd  7/28 UA neg     RADIOLOGY & ADDITIONAL STUDIES:  CXR 7/29  IMPRESSION:  Endotracheal tube terminates approximately 4.5 cm above the gerald.   Otherwise, no significant interval change. No acute infiltrates

## 2023-07-30 NOTE — PROGRESS NOTE ADULT - SUBJECTIVE AND OBJECTIVE BOX
PM EVENTS:     Transfusing 1uPRBC, 2u PLT    ROS: negative except as mentioned above.    Vital Signs Last 24 Hrs  T(C): 37.4 (30 Jul 2023 19:32), Max: 38.2 (30 Jul 2023 08:53)  T(F): 99.4 (30 Jul 2023 19:32), Max: 100.8 (30 Jul 2023 16:00)  HR: 91 (30 Jul 2023 21:00) (91 - 113)  BP: 99/58 (30 Jul 2023 21:00) (95/54 - 137/62)  BP(mean): 75 (30 Jul 2023 21:00) (70 - 89)  RR: 17 (30 Jul 2023 21:00) (13 - 19)  SpO2: 100% (30 Jul 2023 21:00) (98% - 100%)    Parameters below as of 30 Jul 2023 21:00  Patient On (Oxygen Delivery Method): ventilator    O2 Concentration (%): 40  I&O's Detail    29 Jul 2023 07:01  -  30 Jul 2023 07:00  --------------------------------------------------------  IN:    Dexmedetomidine: 108.9 mL    Enteral Tube Flush: 200 mL    IV PiggyBack: 1175 mL    Norepinephrine: 370.9 mL    Norepinephrine: 454.7 mL    Norepinephrine: 203.6 mL    Pantoprazole: 90 mL    Platelets - Single Donor: 456 mL    PRBCs (Packed Red Blood Cells): 600 mL    Propofol: 12.7 mL    Propofol: 280.9 mL    sodium chloride 0.9%: 1050 mL    Sodium Chloride 0.9% Bolus: 1000 mL  Total IN: 6002.7 mL    OUT:    Incontinent per Condom Catheter (mL): 0 mL    Indwelling Catheter - Urethral (mL): 3145 mL    Nasogastric/Oral tube (mL): 900 mL    Voided (mL): 0 mL  Total OUT: 4045 mL    Total NET: 1957.7 mL      30 Jul 2023 07:01  -  30 Jul 2023 21:17  --------------------------------------------------------  IN:    IV PiggyBack: 50 mL    Norepinephrine: 212.3 mL    Pantoprazole: 150 mL    PRBCs (Packed Red Blood Cells): 300 mL    Propofol: 294 mL    sodium chloride 0.9%: 150 mL    sodium chloride 0.9%: 1560 mL  Total IN: 2716.3 mL    OUT:    Dexmedetomidine: 0 mL    Indwelling Catheter - Urethral (mL): 2115 mL    Nasogastric/Oral tube (mL): 500 mL  Total OUT: 2615 mL    Total NET: 101.3 mL        ABG - ( 30 Jul 2023 09:33 )  pH, Arterial: 7.44  pH, Blood: x     /  pCO2: 25    /  pO2: 190   / HCO3: 17    / Base Excess: -5.4  /  SaO2: 99.9                                    6.8    4.64  )-----------( 55       ( 30 Jul 2023 17:28 )             20.1     07-30    139  |  113<H>  |  16  ----------------------------<  198<H>  3.6   |  20<L>  |  0.62    Ca    7.1<L>      30 Jul 2023 17:28  Phos  2.3     07-30  Mg     1.8     07-30    TPro  3.7<L>  /  Alb  1.8<L>  /  TBili  3.0<H>  /  DBili  1.5<H>  /  AST  31  /  ALT  22  /  AlkPhos  57  07-30      MEDICATIONS  (STANDING):  atorvastatin 20 milliGRAM(s) Oral at bedtime  chlorhexidine 0.12% Liquid 15 milliLiter(s) Oral Mucosa every 12 hours  chlorhexidine 2% Cloths 1 Application(s) Topical <User Schedule>  dexAMETHasone  Injectable 2 milliGRAM(s) IV Push every 12 hours  escitalopram 5 milliGRAM(s) Oral daily  fluticasone propionate 50 MICROgram(s)/spray Nasal Spray 1 Spray(s) Both Nostrils two times a day  insulin lispro (ADMELOG) corrective regimen sliding scale   SubCutaneous Before meals and at bedtime  lacosamide IVPB 50 milliGRAM(s) IV Intermittent every 12 hours  meropenem  IVPB 1000 milliGRAM(s) IV Intermittent every 8 hours  multivitamin 1 Tablet(s) Oral daily  norepinephrine Infusion 0.05 MICROgram(s)/kG/Min (3.4 mL/Hr) IV Continuous <Continuous>  pantoprazole Infusion 8 mG/Hr (10 mL/Hr) IV Continuous <Continuous>  potassium phosphate IVPB 15 milliMole(s) IV Intermittent once  propofol Infusion 20 MICROgram(s)/kG/Min (8.71 mL/Hr) IV Continuous <Continuous>  psyllium Powder 1 Packet(s) Oral two times a day  sodium chloride 3 Gram(s) Oral every 6 hours  sodium chloride 0.9%. 1000 milliLiter(s) (120 mL/Hr) IV Continuous <Continuous>  sucralfate 1 Gram(s) Oral every 6 hours    MEDICATIONS  (PRN):  acetaminophen     Tablet .. 650 milliGRAM(s) Oral every 6 hours PRN Temp greater or equal to 38C (100.4F), Mild Pain (1 - 3)  fentaNYL    Injectable 25 MICROGram(s) IV Push every 2 hours PRN vent dysynchrony  marijuana, medical 2 Capsule(s) Oral every 6 hours PRN Nausea  ondansetron Injectable 4 milliGRAM(s) IV Push every 6 hours PRN Nausea and/or Vomiting      EXAM:       Neuro: Eyes open, not attending, not following commands, PERRL, gaze conjugate, POWER (R/L) upper localizes/localizes, LOWER WD/WD  Chest: nonlabored respirations, no adventitious lung sounds bilaterally, heart regular rate/rhythm, present S1/S2, no murmurs or rubs  Abdomen: nondistended, soft and nontender without peritoneal signs, normoactive bowel sounds  Extremities: no clubbing, well-perfused, no edema    Please see the day's note documented by Dr. Lerma for detailed ongoing assessment and plan.     Neuro check q4, PT/OT, decrease dex, RASS goal -1 to -2, sedation with propofol  AC/VC   Levo/austin prn to maintain MAP > 65  Neel-trac  FOBT(+)  Meropenem for klebsiella bacteremia, id consultation  Will need CT CAP w/ contrast, holding off due to suspected ATN

## 2023-07-30 NOTE — PROGRESS NOTE ADULT - ASSESSMENT
Assessment: 68m hx GBM s/p resection, recent chemo, type 2 DM, admit septic shock 2/2 klebsiella bacteremia     NEURO:  -neuro check q4  -pain management w/ tylenol  -PT/OT evaluation when stable   -decrease dexamethasone from 4q12 to 2q12 in setting of septic shock 2/2 bacteremia   sedation w/ propofol RASS goal -1 to -2     PULMONARY:  Currently requiring mechanical ventilation for air-way protection   Vent settings 400/15/5/40  respiratory alkalosis - taking large TV; needs sedation   repeat ABG trend       CARDIOVASCULAR:  Hypotension in setting of likely septic shock ? combined w/ acute blood loss anemia 2/2 GIB  norepinephrine ggt MAP goal >65   Neel-trac monitoring   monitor on telemetry   vitals q1  TTE ordered and pending; former hx of heartfailure; per family EF 55% on repeat TTE as outpatient     GASTROENTEROLOGY:  NPO  fecal occult (+), stool brown color   OG tube to intermittent suction w/ coffee ground appearing fluid   GI consulted for endoscopy evaluation   GI ppx : Protonix 40mg BID for suspected GIB   Daily stool count,    RENAL/:  dark urine; ?myoglobin vs hematuria vs bilirubinemia   -check BMP q12  -strict i/o's ; critically ill ,continues to need Campuzano  -Na goal 135-145; replete lytes     ENDOCRINE:  Diabetes Mellitus  A1c- 5.7  Monitor FSG q6 hrs while NPO  HISS    HEME/ONC:  pancytopenia ?chemo induced vs DIC; will send DIC labs; concern for GIB trend hgb q8hrs, platelets txfusion x1 goal >80  DVT ppx: will hold chemical dvt ppx in setting of low platelets  b/l SCDs  s/p 2U PRBC, 2Uplatelets     INFECTIOUS:   Monitor for fevers   klebsiella bacteremia - c/w meropenem ID following,  will need CT/ab/pelvis w/ contrast

## 2023-07-30 NOTE — PROGRESS NOTE ADULT - SUBJECTIVE AND OBJECTIVE BOX
Pt seen and examined at bedside.  Tmax 102.8 overnight s/p acetaminophen and on cooling blanket  Minimal documented OG outpt since yesterday evening <50 cc of bilious liquid in canister   No BM overnight    Additional 1 unit of pRBC and 1 unit platelet transfused this AM    Unable to obtain ROS as pt intubated and sedated    Allergies    amoxicillin (Rash)    Intolerances        MEDICATIONS:  MEDICATIONS  (STANDING):  atorvastatin 20 milliGRAM(s) Oral at bedtime  chlorhexidine 0.12% Liquid 15 milliLiter(s) Oral Mucosa every 12 hours  chlorhexidine 2% Cloths 1 Application(s) Topical <User Schedule>  dexAMETHasone  Injectable 2 milliGRAM(s) IV Push every 12 hours  escitalopram 5 milliGRAM(s) Oral daily  fluticasone propionate 50 MICROgram(s)/spray Nasal Spray 1 Spray(s) Both Nostrils two times a day  insulin lispro (ADMELOG) corrective regimen sliding scale   SubCutaneous Before meals and at bedtime  lacosamide IVPB 50 milliGRAM(s) IV Intermittent every 12 hours  meropenem  IVPB 1000 milliGRAM(s) IV Intermittent every 8 hours  multivitamin 1 Tablet(s) Oral daily  norepinephrine Infusion 0.05 MICROgram(s)/kG/Min (3.4 mL/Hr) IV Continuous <Continuous>  pantoprazole Infusion 8 mG/Hr (10 mL/Hr) IV Continuous <Continuous>  propofol Infusion 20 MICROgram(s)/kG/Min (8.71 mL/Hr) IV Continuous <Continuous>  psyllium Powder 1 Packet(s) Oral two times a day  sodium chloride 3 Gram(s) Oral every 6 hours  sodium chloride 0.9%. 1000 milliLiter(s) (120 mL/Hr) IV Continuous <Continuous>  sucralfate 1 Gram(s) Oral every 6 hours    MEDICATIONS  (PRN):  acetaminophen     Tablet .. 650 milliGRAM(s) Oral every 6 hours PRN Temp greater or equal to 38C (100.4F), Mild Pain (1 - 3)  fentaNYL    Injectable 25 MICROGram(s) IV Push every 2 hours PRN vent dysynchrony  marijuana, medical 2 Capsule(s) Oral every 6 hours PRN Nausea  ondansetron Injectable 4 milliGRAM(s) IV Push every 6 hours PRN Nausea and/or Vomiting      Vital Signs Last 24 Hrs  T(C): 38.2 (30 Jul 2023 08:53), Max: 39.3 (29 Jul 2023 21:00)  T(F): 100.7 (30 Jul 2023 08:53), Max: 102.8 (29 Jul 2023 21:00)  HR: 97 (30 Jul 2023 08:40) (88 - 107)  BP: 120/57 (30 Jul 2023 08:00) (94/55 - 137/62)  BP(mean): 82 (30 Jul 2023 08:00) (70 - 89)  RR: 18 (30 Jul 2023 08:00) (16 - 18)  SpO2: 100% (30 Jul 2023 08:40) (98% - 100%)    Parameters below as of 30 Jul 2023 08:40  Patient On (Oxygen Delivery Method): ventilator    O2 Concentration (%): 40    07-29 @ 07:01  -  07-30 @ 07:00  --------------------------------------------------------  IN: 6002.7 mL / OUT: 4045 mL / NET: 1957.7 mL    07-30 @ 07:01  -  07-30 @ 11:20  --------------------------------------------------------  IN: 179.1 mL / OUT: 150 mL / NET: 29.1 mL        PHYSICAL EXAM:    General: Intubated/sedated. Ill appearing  HEENT: OG tube with bilious output  Cardiac: Regular rate on tele  Pulm: Mechanical ventilation  Gastrointestinal: Soft mildly distended.  Skin: Pale, warm to touch    LABS:  CBC Full  -  ( 30 Jul 2023 09:58 )  WBC Count : 5.49 K/uL  RBC Count : 2.27 M/uL  Hemoglobin : 7.2 g/dL  Hematocrit : 21.5 %  Platelet Count - Automated : 74 K/uL  Mean Cell Volume : 94.7 fl  Mean Cell Hemoglobin : 31.7 pg  Mean Cell Hemoglobin Concentration : 33.5 gm/dL  Auto Neutrophil # : x  Auto Lymphocyte # : x  Auto Monocyte # : x  Auto Eosinophil # : x  Auto Basophil # : x  Auto Neutrophil % : x  Auto Lymphocyte % : x  Auto Monocyte % : x  Auto Eosinophil % : x  Auto Basophil % : x    07-30    135  |  110<H>  |  18  ----------------------------<  217<H>  3.3<L>   |  16<L>  |  0.66    Ca    6.7<L>      30 Jul 2023 06:28  Phos  2.4     07-30  Mg     1.6     07-30    TPro  3.7<L>  /  Alb  1.8<L>  /  TBili  3.0<H>  /  DBili  1.5<H>  /  AST  31  /  ALT  22  /  AlkPhos  57  07-30    PT/INR - ( 29 Jul 2023 15:34 )   PT: 14.3 sec;   INR: 1.26          PTT - ( 29 Jul 2023 15:34 )  PTT:27.4 sec      Urinalysis Basic - ( 30 Jul 2023 06:28 )    Color: x / Appearance: x / SG: x / pH: x  Gluc: 217 mg/dL / Ketone: x  / Bili: x / Urobili: x   Blood: x / Protein: x / Nitrite: x   Leuk Esterase: x / RBC: x / WBC x   Sq Epi: x / Non Sq Epi: x / Bacteria: x

## 2023-07-30 NOTE — PROGRESS NOTE ADULT - SUBJECTIVE AND OBJECTIVE BOX
NEUROCRITICAL CARE PROGRESS NOTE    OSIEL NORWOOD   MRN-[id number]  Summary:  /  HPI:  Osiel Norwood is a 69YO male with a past medical history of type 2 diabetes, hyperlipidemia, iron deficiency anemia, tracheal stenosis s/p tracheostomy (which was closed by ENT 7 days ago); glioblastoma s/p resection 2022, and Avastin treatment 3/2023 presenting with altered mental status and weakness x2 days. Per family, they last saw him last night around 6pm when he was at baseline. Patient has intermittent expressive aphasia but is getting worse and now is persistent, and has intermittent nausea and vomiting since last night. Patient was supposed to receive an outpatient brain MRI on 2023.  Stroke code performed in  is negative for CVA. Head CT shows new hyperdensity in frontal parietal - surgical site. MRI is ordered to rule out tumor recurrence.   Per daughter at bedside, Entresto and Eliquis were stopped per his PC - cardiologist.      (2023 13:10)      S/Overnight events:   ANA LILIA overnight. Neuro stable.     Hospital Course:  : Admitted. Na 115 in ED with repeat 118, started 3% at 30cc/hr, and salt tabs 3q6, stability CTH in ED showing Interval development of a 13 mm hyperdense nodule along the   inferior margin of the resection cavity which may represent progression   of disease with hemorrhage, repeat CTH stable, urine studies ordered  : Neuro stable, 12AM BMP Na114 3% increased to 40cc/hr, urine studies, pancx to r/o infection since pt is immunocompromised. Changed pantoprazole to sucralfate for GI ppx d/t thrombocytopenia. LE dopplers negative. DC'd 3%. ENT consulted to assess stoma, recommend follow up upon discharge with ENT, Repeat sodium 122. Restarted 3% at 30.  : ANA LILIA o/n neuro stable. remains on 3%@30cc/hr. Started florinef, increased 3% to 50cc/hr. Increased 3% to 75cc/hr.    7/10: continued current 3% rate o/n. Na 127 --> 125, cont 3% @75cc/hr, f/u repeat Na this evening, likely stepdown tomorrow. Pend dispo home with home PT/OT when able. Heme consulted for thrombocytopenia  : ANA LILIA o/n. Remains on 3% @75cc/hr. Decreased 3% to 50cc/hr. Started 1.2L fluid restriction. Repeat Na corrected 128  : ANA LILIA overnight, remains on 3%, SDU from ICU  : ANA LILIA overnight, neuro stable, on 3%@50, Am sodium 137, decreased 3% to 25cc/hr, repeat Na 138, decreased to 15cc/hr.  : ANA LILIA overnight, neuro stable, remains on 3%@15cc/hr  7/15: ANA LILIA overnight. Neuro exam stable. Pt remains on 3% saline.Sodium 134 this AM remains on 3%@25. Per nephrology recs hypertonic Na d'ceed, Na tabs 2q6, URENA 15g BID, cont florinef/ fluid restriction.Per nephrology Na >130 ok when ready for d/c   : ANA LILIA overnight, hypertonics d/c now on urea powder/1L fluid restriction/florinef and salt tabs per renal, hydrocortisone cream ordered for rash on back, rec for Home PT  : ANA LILIA overnight, following Na, renal following, ENT saw for hoarse voice rec followup with CT surgery for possible dilation, pending Home PT. Patient developed sudden onset severe headache. Hyperventilating with increased work of breathing. Wheezing in all lung fields to auscultation. Neuro intact on exam. Duoneb x1 ordered. Dilaudid 0.25 IV given for pain control. Subsequently patient developed nausea with multiple episodes of vomiting. Hypertensive with SBP in the 190s. Given hydralazine 10mg IVP, Zofran. Stroke code and rapid response called. STAT labs sent. CTH/CTA/CTP completed. Tachycardic to the 180s, consistent with SVT, remained hypertensive. Given 10 of labetalol, SVT broke. Transferred to Telemetry. WBC 23.43, lactate 8.5. Given 1L fluid bolus. Pan culture sent. Started empirically on Vanc/Meropenem.   : Put on eeg. Voiding while retaining urine, SC for 500cc. EEG +spikes, epilepsy consulted. CTA chest and CT A/P pending. Keppra increased 1g BID.   : Cont EEG, trend Na, possible NGT today if not able to tolerate PO. Blood cx growing GS + cocci in clusters, f/u MRSA swab and pan cx, cont meropenem and vancomycin. Vanc trough in am. F/u urine studies.   : Off EEG, neuro exam improved. ID following for concern for sepsis, likely aerobic blood cx bottle staph epi contaminant, cont monitoring off of abx, possible drug reaction as cause. F/u surveillance blood cx. Hyponatremia, repeat Na 129 overnight from 130, cont current regimen and f/u am labs and nephrology recs. AM na 129 continuing fluid restriction, nephro recommending restarting urena, but holding due to possible drug reaction previously.   : neurologically stable, c/w fluid restriction, f/u AM Na, urine osm, urine Na, cortisol. Given 15mg of tolvaptan. Fluid restriction, florinef d/c'd per nephro recs. Held salt tabs for today. 6pm BMP Na 122, 10pm BMP Na 131, urine osm 141  : ANA LILIA ovn, neuro stable. AM Na 133. Restarted salt tabs 3q6 and 1L fluid restriction. Lantus 10u at bedtime added. 9:30pm BMP per nephro Na 138  : ANA LILIA ovn, neuro stable. Na stable, discussed with nephrology can cont tolvaptan 15mg daily PRN for hyponatremia, only needed for hypoNa, can discontinue fluid restriction. Pt evaluated at bedside after nurse noted patient to have some expressive aphasia and perseverating; patient oriented to self, unable to say hospital or year, following all commands and DUMONT 5/5 strength, no drift, no facial droop, perseverating on pt's  when asked other questions. Pt afebrile, vitals stable, cont keppra 1g BID, cont decadron 4mg BID, discussed with Dr. Dorado, defer CTH at this time and monitor clinically. . Lantus increased to 14u at bedtime.  : o/n PSVT 13 beats followed by sinus tachycardia in setting of anxiety and net negative fluid balance.  Resolved to 102 with verbal comfort and further resolved with 500 cc NS. Lantus increased to 18u qhs. Dr. oCsta consulted.  : ANA LILIA overnight. Neuro stable.   : ANA LILIA overnight. Neuro exam stable. Dispo pending.  : multiple BMs o/n. Na stable. Lexapro increased to 10mg daily for worsening depression. Held bowel regimen 2/2 multiple stools.   : ANA LILIA overnight, Na 133 f/u AM labs, pending [place]. Pt has episode of sustained tachycardia, c/o feeling warm, and dizzy while working with Pt. Given 500cc NS bolus, rectal temp was 99F. Family brought in medical marijuana. Pt had rectal temp 100.9, pancultured. Given another 1L bolus and started on maintenance fluids. Rectal temp 102. Started empirically on vanc/cipro/flagyl. Upgraded to telemetry.  : In AM patient had episode of emesis and was unresponsive to sternal rub. Dr. Huggins, neurointensivist at bedside. Repeat BP 60s/40s, tachycardic to 110s. Rapid response called. Levo gtt started. Upgraded to ICU. Intubated for airway protection. Likely septic shock given GNR in blood cx growing K.pneumoniae, Given additional 1L bolus x2. Abx changed to vanc and meropenem, ID following, rec to d/c vanc, c/w meropenem 5st4jdq, FOBT positive, keppra changed to vimpat 50BID, fentanyl 25mg q4hrs prn for vent dysynchrony, home lexapro decreased to 5mg, decadron decreased to 2q12, sucralfate increased to 1q6 iso GI bleed, pending repeat CBC, CMP, ABG, DIC labs, xray abdomen to r/o obstruction, propofol gtt started for sedation, weaning off precedex gtt, GI consulted rec protonix 40bid iso possible GI bleed, plan for possible EGD, albumin 1.7, 25% albumin 50ml given, s/p 1u PRBC and PLT repeat cbc hgb 8.0 from 6.6 and PLT 88 from 55. Protonix gtt started per GI recs.   : ANA LILIA overnight. Neuro stable.     Vital Signs Last 24 Hrs  T(C): 39.3 (2023 21:00), Max: 39.3 (2023 21:00)  T(F): 102.8 (2023 21:00), Max: 102.8 (2023 21:00)  HR: 98 (2023 00:00) (88 - 112)  BP: 128/60 (2023 00:00) (69/42 - 168/67)  BP(mean): 86 (2023 00:00) (50 - 108)  RR: 17 (:00) (15 - 23)  SpO2: 99% (:00) (94% - 100%)    Parameters below as of 2023 00:00  Patient On (Oxygen Delivery Method): ventilator    O2 Concentration (%): 40    Mode: AC/ CMV (Assist Control/ Continuous Mandatory Ventilation), RR (machine): 14, TV (machine): 400, FiO2: 40, PEEP: 5, ITime: 1, MAP: 5, PIP: 16    I&O's Detail    2023 07:01  -  2023 07:00  --------------------------------------------------------  IN:    Propofol: 9 mL    sodium chloride 0.9%: 600 mL    Sodium Chloride 0.9% Bolus: 2000 mL  Total IN: 2609 mL    OUT:    Voided (mL): 1150 mL  Total OUT: 1150 mL    Total NET: 1459 mL      2023 07:01  -  2023 01:03  --------------------------------------------------------  IN:    Dexmedetomidine: 108.9 mL    IV PiggyBack: 500 mL    Norepinephrine: 370.9 mL    Norepinephrine: 410.5 mL    Pantoprazole: 30 mL    Platelets - Single Donor: 220 mL    PRBCs (Packed Red Blood Cells): 300 mL    Propofol: 12.7 mL    Propofol: 143.7 mL    sodium chloride 0.9%: 600 mL    Sodium Chloride 0.9% Bolus: 1000 mL  Total IN: 3696.7 mL    OUT:    Incontinent per Condom Catheter (mL): 0 mL    Indwelling Catheter - Urethral (mL): 1895 mL    Nasogastric/Oral tube (mL): 900 mL    Voided (mL): 0 mL  Total OUT: 2795 mL    Total NET: 901.7 mL    PHYSICAL EXAM:  General: Patient laying in bed, intubated, in no apparent distress. Sedation held for exam.   HEENT: 4mm pupils equal and reactive to light bilaterally.   Cardiovascular: Clear S1 and S2 without murmurs, gallops or rubs auscultated.  Respiratory: intubated on FVS, Clear to ausculation bilaterally without wheezing, rhonchi or rales.  GI: Soft, nontender, nondistended. Positive bowel sounds in all four quadrants.  Neuro: Not OE, grimacing to noxious stimuli, not FC. PERRL. RUE AG to noxious, LUE moves within plane of bed to noxious, b/l LE TF to noxious, +cough/gag/corneals  Extremities: 2+ Dorsalis Pedis and Posterior Tibial pulses bilaterally.    TUBES/LINES:  [x] Gonzalez  [] Lumbar Drain  [] Wound Drains  [x] Others: a-line, R IJ CVC      DIET:  [x] NPO  [] Mechanical  [] Tube feeds        LABS:                        8.0    6.50  )-----------( 88       ( 2023 15:01 )             23.3         136  |  112<H>  |  23  ----------------------------<  133<H>  4.1   |  16<L>  |  0.67    Ca    6.7<L>      2023 15:10  Phos  3.0       Mg     1.8         TPro  x   /  Alb  x   /  TBili  x   /  DBili  1.1<H>  /  AST  x   /  ALT  x   /  AlkPhos  x       PT/INR - ( 2023 15:34 )   PT: 14.3 sec;   INR: 1.26          PTT - ( 2023 15:34 )  PTT:27.4 sec  Urinalysis Basic - ( 2023 20:43 )    Color: Red / Appearance: Turbid / S.025 / pH: x  Gluc: x / Ketone: Trace mg/dL  / Bili: Small / Urobili: 1.0 E.U./dL   Blood: x / Protein: >=300 mg/dL / Nitrite: NEGATIVE   Leuk Esterase: NEGATIVE / RBC: Many /HPF / WBC < 5 /HPF   Sq Epi: x / Non Sq Epi: x / Bacteria: Present /HPF          CAPILLARY BLOOD GLUCOSE      POCT Blood Glucose.: 134 mg/dL (2023 21:21)  POCT Blood Glucose.: 112 mg/dL (2023 11:44)  POCT Blood Glucose.: 242 mg/dL (2023 06:47)  POCT Blood Glucose.: 37 mg/dL (2023 06:32)      Drug Levels: [] N/A    CSF Analysis: [] N/A      Allergies    amoxicillin (Rash)    Intolerances      MEDICATIONS:  Antibiotics:  meropenem  IVPB 1000 milliGRAM(s) IV Intermittent every 8 hours    Neuro:  acetaminophen     Tablet .. 650 milliGRAM(s) Oral every 6 hours PRN  dexMEDEtomidine Infusion 0.2 MICROgram(s)/kG/Hr IV Continuous <Continuous>  escitalopram 5 milliGRAM(s) Oral daily  fentaNYL    Injectable 25 MICROGram(s) IV Push every 2 hours PRN  lacosamide IVPB 50 milliGRAM(s) IV Intermittent every 12 hours  ondansetron Injectable 4 milliGRAM(s) IV Push every 6 hours PRN  propofol Infusion 5 MICROgram(s)/kG/Min IV Continuous <Continuous>  propofol Infusion 20 MICROgram(s)/kG/Min IV Continuous <Continuous>    Anticoagulation:    OTHER:  atorvastatin 20 milliGRAM(s) Oral at bedtime  benzocaine/menthol Lozenge 1 Lozenge Oral every 4 hours PRN  chlorhexidine 0.12% Liquid 15 milliLiter(s) Oral Mucosa every 12 hours  dexAMETHasone  Injectable 2 milliGRAM(s) IV Push every 12 hours  fluticasone propionate 50 MICROgram(s)/spray Nasal Spray 1 Spray(s) Both Nostrils two times a day  insulin lispro (ADMELOG) corrective regimen sliding scale   SubCutaneous Before meals and at bedtime  marijuana, medical 2 Capsule(s) Oral every 6 hours PRN  norepinephrine Infusion 0.05 MICROgram(s)/kG/Min IV Continuous <Continuous>  pantoprazole Infusion 8 mG/Hr IV Continuous <Continuous>  psyllium Powder 1 Packet(s) Oral two times a day  sucralfate 1 Gram(s) Oral every 6 hours    IVF:  sodium chloride 3 Gram(s) Oral every 6 hours  sodium chloride 0.9%. 1000 milliLiter(s) IV Continuous <Continuous>    CULTURES:  Culture Results:   Culture in progress ( @ 17:01)  Culture Results:   No growth at 1 day. ( @ 17:01)      ASSESSMENT:  Pt is a 69YO male with a past medical history of type 2 diabetes, hyperlipidemia, iron deficiency anemia, tracheal stenosis s/p tracheostomy (which was closed by ENT 7 days ago); glioblastoma s/p resection 2022, and Avastin treatment 3/2023 presenting with expressive aphasia, nuasea and vomiting x1 day. CT shows new hyperdensity in left frontal parietal - surgical site c/f disease progression. Also hyponatremic to 118. Admitted for Na management. Hospital course complicated by concern for possible sepsis, ID following now off abx, hemodynamically stable.      Plan:  Neuro:  -neuro q4h/vitals q1hrs   -CTH : Interval development of a 13 mm hyperdense nodule along the inferior margin of the resection cavity which may represent progression of disease with hemorrhage. Repeat CTH stable, CTH  stable   -CTA : negative, CTA  stable  -CTP  stable.   -pain control prn  -vimpat 50BID switched from keppra  thrombocytopenia  -Decadron 2mg BID  -c/w home Lexapro 5mg (home dose 5mg)  -home ASA 81 held i/s/o thrombocytopenia   -MRI brain suspicious for recurrent tumor and hydro   -palliative following   -propofol gtt for sedation    Pulm:  -VC/AC 14/400/45/5    Cardio:  --160  -  outpatient echo EF 55%  - levo gtt    GI:  -OG tube to IWS  -bowel regimen held for loose stools, LBM   -GI following, protonix gtt started iso possible GI bleed  - sucralfate 1q6 while on steroids    Renal:  - Hyponatremic (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  - Trend BMP  - Maintenance fluids @ 75cc/hr  - salt tabs 3q6   - +gonzalez for accurate I+Os  - off NS for pulm edema on CXR; off hypertonics since 7/15  - s/p tolvaptan 15mg   - dc'd urea powder 15g BID started per Renal d/t petechial rash     Endo:  -ISS while on Decadron  -A1c 5.7  -lantus 18u d/c'd  -h/o T2DM: home Januvia held  -h/o HLD: c/w Atorvastatin 20mg    Heme:  -SCDs for DVT ppx, SQL and Aspirin 81 held for thrombocytopenia   -heme consulted for thrombocytopenia- w/u sent, likely chronic thrombocytopenia d/t chemotherapy agent in past   -Per heme transfuse 1U plt if bleeding and platelet count <50k, if febrile and platelet count <20k  -1u PRBC, 1U PLT   -LE dopplers  negative  -LUE doppler: L superficial cephalic  -CTA PE protocol for tachycardia: negative     ID:  - Pancultured , bcx growing K. pneumoniae  - ID following  - meropenem 1g q8hrs (-)  -Febrile, leukocytosis and tachycardia with elevated lactic acid on   -vancomycin/meropenem empirically (-), d/c per ID and repeat blood cx , NGTD    Dispo: NSICU status, full code, AR    D/w Dr. Dorado

## 2023-07-30 NOTE — PROGRESS NOTE ADULT - ASSESSMENT
68M h/o GBM s/p resection 1/27/2022 and Avastin 3/2023 trachal stenosis s/p tracheostomy which is now closed admitted with symptomatic hyponatremia with AMS, now improved, with hospital course c/b septic shock due to K.pneumo bacteremia with concern for GIB    Continues to require vasoactive therapy to maintain MAPs. No visible GI bleeding since PM assessment.  Minimal bilious, non bloody output from OG and no BM.  BUN:Cr remain normal.    Hgb and plt continue to downtrend with suboptimal response to transfusion however no clinical bleeding to suggest this is due to GI losses.  While occult blood is positive, visible GI bleeding would be expected to account for degree of anemia.  Persistent thrombocytopenia is also notable and suggests a hematologic process.      Sepsis 2/2 GNR bacteremia is favored to be the primary cause of shock rather than hemorrhagic given persistent fever which would not typically be caused by acute GI bleeding.    The gastric contents from OG seen yesterday PM, likely represent old blood due to diffuse ischemic injury i/s/o septic shock.  Minimal further OG outpt reassuring.    On ddx for K. pneumo bacteremia may be GI tract i/s/o GIB, however medical mgmt and resuscitation is recommended at this time in the absence of signs/sx to suggest continued active bleeding with appropriate pRBC transfusion response    Recommendations:  -Given persistent worsening thrombocytopenia and septic shock requiring pressor, risk of endoscopic intervention outweighs benefit at this time.  Low likelihood of there being a lesion amenable to endoscopic therapy explaining clinical picture in the absence of visible GI bleeding.  -Continue PPI gtt and carafate via OG  -Keep NPO otherwise  -Trend Hgb. Maintain active T&S  -Consider hematology consultation  -Large bore PIV x2.  Fluid resuscitation and transfuse as indicated per primary  -Mgmt of sepsis per ID and primary team    Discussed with Dr. Hannah Hernandez,   Gastroenterology Fellow  Pager: [phone]  After 5PM or on weekends, please contact [place]  for fellow on call

## 2023-07-31 NOTE — PROGRESS NOTE ADULT - ASSESSMENT
ASSESSMENT:   69 y/o M with septic shock secondary to klebsiella bacteremia   GBM s/p resection, recent chemotherapy  History of Takotsubo cardiomyopathy  SIADH  Type 2 DM    NEURO:  Neuro check q4  Analgesia  Sedation: On propofol RASS goal -1 to -2   Cerebral edema: On dexamethasone 2mg Q12  Activity: Bedrest for now. PT/OT evaluation when stable     PULMONARY: Currently requiring mechanical ventilation for airway protection   Vent settings 15/400/40/5  CXR, ABG  VAP bundle     CARDIOVASCULAR: Hypotension in setting of likely septic shock and acute blood loss anemia 2/2 suspected GIB  Norepinephrine ggt MAP goal >65   Neel-trac monitoring- DC  Monitor on telemetry   TTE ordered and pending;     GASTROENTEROLOGY: Suspected GI bleed  Fecal occult (+), stool brown color   NPO: OG tube to intermittent suction w/ coffee ground appearing fluid   GI consulted for endoscopy evaluation   GI ppx: On protonix gtt and sucralfate  Trend H/H  Daily stool count    RENAL/: Dark urine; ?myoglobin vs hematuria vs bilirubinemia   Monitor BMP q12  Strict Is/Os. Pt critically ill, continues to need Campuzano- deescalate once appropriate  Na goal 135-145; replete lytes   Monitor CPK, trigs, LFTs while on propofol    ENDOCRINE: Diabetes Mellitus  A1c- 5.7  Monitor FSG q6 hrs while NPO  ISS    HEME/ONC: Pancytopenia ?chemo induced vs DIC  DIC lab:   Concern for GIB: Trend H/H and plt. Hb goal >7.0, plt goal >80  DVT ppx: will hold chemical dvt ppx in setting of low platelets  B/L SCDs  Total products:   Consider heme consult      INFECTIOUS: Klebsiella bacteremia ?source  On meropenem   ID following  Repeat blood cultures 7/31  Monitor for fevers   Follow up TTE    MISC:  Palliative care consult    SOCIAL/FAMILY:  [] awaiting [] updated at bedside [] family meeting    CODE STATUS:  [] Full Code [] DNR [] DNI [] Palliative/Comfort Care    DISPOSITION:  [] ICU [] Stroke Unit [] Floor [] EMU [] RCU [] PCU    Time spent: ___ [] critical care minutes     ASSESSMENT:   69 y/o M with septic shock secondary to klebsiella bacteremia   GBM s/p resection, recent chemotherapy    History of Takotsubo cardiomyopathy  Chronic SIADH  Type 2 DM    NEURO:  Neuro check q4  Analgesia  Sedation: On propofol RASS goal 0 to neg 1 (SUSHMA labs)  Cerebral edema: On dexamethasone 2mg Q12  Activity: Bedrest for now. PT/OT evaluation when stable     PULMONARY: Currently requiring mechanical ventilation for airway protection   Vent settings 14/400/40/5  CXR, ABG  VAP bundle     CARDIOVASCULAR: Hypotension in setting of likely septic shock and acute blood loss anemia 2/2 suspected GIB  Norepinephrine ggt MAP goal >65- wean off  Flotrac monitoring- DC  TTE ordered and pending  POCUS    GASTROENTEROLOGY: Suspected GI bleed  Fecal occult (+), stool brown color   NPO: OG tube to intermittent suction w/ coffee ground appearing fluid   Will likely need TPN   GI consulted for endoscopy evaluation   GI ppx: On protonix gtt and sucralfate  Trend H/H  CT chest/ abd/ pelvis.    RENAL/: Dark urine; ?myoglobin vs hematuria vs bilirubinemia. Chronic SIADH  Monitor BMPs Q12 to daily  Strict Is/Os. Pt critically ill, continues to need Campuzano- deescalate once appropriate.  Na goal 135-145; replete lytes   Monitor CPK, trigs, LFTs while on propofol  Repeat lactate 7/31    ENDOCRINE: Diabetes Mellitus  A1c- 5.7  ISS while NPO  Lantus 6u qhs  Cortisol level     HEME/ONC: Pancytopenia ?chemo-induced vs DIC, JAGDISH.  DIC labs  Concern for GIB: Trend H/H and plt. Hb goal >7.0, plt goal >80  DVT ppx: will hold chemical DVT ppx in setting of low platelets  B/L SCDs  Transfused 3 PRBCs and 3 plts in 24hours  Consider heme consult.    INFECTIOUS: Klebsiella bacteremia ?source  On meropenem   ID following  Repeat blood cultures 7/31 and daily. NGTD from 7/30  Monitor for fevers   Follow up TTE    MISC:  Palliative care consult    SOCIAL/FAMILY:  [] awaiting [x] updated at bedside [] family meeting    CODE STATUS:  [x] Full Code [] DNR [] DNI [] Palliative/Comfort Care    DISPOSITION:  [x] ICU [] Stroke Unit [] Floor [] EMU [] RCU [] PCU    Time spent: 60 critical care minutes

## 2023-07-31 NOTE — PROGRESS NOTE ADULT - ASSESSMENT
68m hx GBM s/p resection, recent chemo, type 2 DM, admit septic shock 2/2 klebsiella bacteremia     -neuro check q4  -sedation w/ propofol RASS goal -1 to -2   on mechanical vent; CPAP trials as tolerated  -downtrending h/h; GI following possible EGD tomorrow  -d/c IVF; provide IV lasix 20mg x1;   -c/w ceftriaxone & flagyl

## 2023-07-31 NOTE — PROGRESS NOTE ADULT - SUBJECTIVE AND OBJECTIVE BOX
HPI:  Osiel Norwood is a 69YO male with a past medical history of type 2 diabetes, hyperlipidemia, iron deficiency anemia, tracheal stenosis s/p tracheostomy (which was closed by ENT 7 days ago); glioblastoma s/p resection 2022, and Avastin treatment 3/2023 presenting with altered mental status and weakness x2 days. Per family, they last saw him last night around 6pm when he was at baseline. Patient has intermittent expressive aphasia but is getting worse and now is persistent, and has intermittent nausea and vomiting since last night. Patient was supposed to receive an outpatient brain MRI on 2023.  Stroke code performed in  is negative for CVA. Head CT shows new hyperdensity in frontal parietal - surgical site. MRI is ordered to rule out tumor recurrence.   Per daughter at bedside, Entresto and Eliquis were stopped per his PC - cardiologist.     S/Overnight events:    ANA LILIA overnight. Neuro stable.     Hospital Course:   : Admitted. Na 115 in ED with repeat 118, started 3% at 30cc/hr, and salt tabs 3q6, stability CTH in ED showing Interval development of a 13 mm hyperdense nodule along the   inferior margin of the resection cavity which may represent progression   of disease with hemorrhage, repeat CTH stable, urine studies ordered  : Neuro stable, 12AM BMP Na114 3% increased to 40cc/hr, urine studies, pancx to r/o infection since pt is immunocompromised. Changed pantoprazole to sucralfate for GI ppx d/t thrombocytopenia. LE dopplers negative. DC'd 3%. ENT consulted to assess stoma, recommend follow up upon discharge with ENT, Repeat sodium 122. Restarted 3% at 30.  : ANA LILIA o/n neuro stable. remains on 3%@30cc/hr. Started florinef, increased 3% to 50cc/hr. Increased 3% to 75cc/hr.    7/10: continued current 3% rate o/n. Na 127 --> 125, cont 3% @75cc/hr, f/u repeat Na this evening, likely stepdown tomorrow. Pend dispo home with home PT/OT when able. Heme consulted for thrombocytopenia  : ANA LILIA o/n. Remains on 3% @75cc/hr. Decreased 3% to 50cc/hr. Started 1.2L fluid restriction. Repeat Na corrected 128  : ANA LILIA overnight, remains on 3%, SDU from ICU  : ANA LILIA overnight, neuro stable, on 3%@50, Am sodium 137, decreased 3% to 25cc/hr, repeat Na 138, decreased to 15cc/hr.  : ANA LILIA overnight, neuro stable, remains on 3%@15cc/hr  7/15: ANA LILIA overnight. Neuro exam stable. Pt remains on 3% saline.Sodium 134 this AM remains on 3%@25. Per nephrology recs hypertonic Na d'ceed, Na tabs 2q6, URENA 15g BID, cont florinef/ fluid restriction.Per nephrology Na >130 ok when ready for d/c   : ANA LILIA overnight, hypertonics d/c now on urea powder/1L fluid restriction/florinef and salt tabs per renal, hydrocortisone cream ordered for rash on back, rec for Home PT  : ANA LILIA overnight, following Na, renal following, ENT saw for hoarse voice rec followup with CT surgery for possible dilation, pending Home PT. Patient developed sudden onset severe headache. Hyperventilating with increased work of breathing. Wheezing in all lung fields to auscultation. Neuro intact on exam. Duoneb x1 ordered. Dilaudid 0.25 IV given for pain control. Subsequently patient developed nausea with multiple episodes of vomiting. Hypertensive with SBP in the 190s. Given hydralazine 10mg IVP, Zofran. Stroke code and rapid response called. STAT labs sent. CTH/CTA/CTP completed. Tachycardic to the 180s, consistent with SVT, remained hypertensive. Given 10 of labetalol, SVT broke. Transferred to Telemetry. WBC 23.43, lactate 8.5. Given 1L fluid bolus. Pan culture sent. Started empirically on Vanc/Meropenem.   : Put on eeg. Voiding while retaining urine, SC for 500cc. EEG +spikes, epilepsy consulted. CTA chest and CT A/P pending. Keppra increased 1g BID.   : Cont EEG, trend Na, possible NGT today if not able to tolerate PO. Blood cx growing GS + cocci in clusters, f/u MRSA swab and pan cx, cont meropenem and vancomycin. Vanc trough in am. F/u urine studies.   : Off EEG, neuro exam improved. ID following for concern for sepsis, likely aerobic blood cx bottle staph epi contaminant, cont monitoring off of abx, possible drug reaction as cause. F/u surveillance blood cx. Hyponatremia, repeat Na 129 overnight from 130, cont current regimen and f/u am labs and nephrology recs. AM na 129 continuing fluid restriction, nephro recommending restarting urena, but holding due to possible drug reaction previously.   : neurologically stable, c/w fluid restriction, f/u AM Na, urine osm, urine Na, cortisol. Given 15mg of tolvaptan. Fluid restriction, florinef d/c'd per nephro recs. Held salt tabs for today. 6pm BMP Na 122, 10pm BMP Na 131, urine osm 141  : ANA LILIA ovn, neuro stable. AM Na 133. Restarted salt tabs 3q6 and 1L fluid restriction. Lantus 10u at bedtime added. 9:30pm BMP per nephro Na 138  : ANA LILIA ovn, neuro stable. Na stable, discussed with nephrology can cont tolvaptan 15mg daily PRN for hyponatremia, only needed for hypoNa, can discontinue fluid restriction. Pt evaluated at bedside after nurse noted patient to have some expressive aphasia and perseverating; patient oriented to self, unable to say hospital or year, following all commands and DUMONT 5/5 strength, no drift, no facial droop, perseverating on pt's  when asked other questions. Pt afebrile, vitals stable, cont keppra 1g BID, cont decadron 4mg BID, discussed with Dr. Dorado, defer CTH at this time and monitor clinically. . Lantus increased to 14u at bedtime.  : o/n PSVT 13 beats followed by sinus tachycardia in setting of anxiety and net negative fluid balance.  Resolved to 102 with verbal comfort and further resolved with 500 cc NS. Lantus increased to 18u qhs. Dr. Costa consulted.  : ANA LILIA overnight. Neuro stable.   : ANA LILIA overnight. Neuro exam stable. Dispo pending.  : multiple BMs o/n. Na stable. Lexapro increased to 10mg daily for worsening depression. Held bowel regimen 2/2 multiple stools.   : ANA LILIA overnight, Na 133 f/u AM labs, pending [place]. Pt has episode of sustained tachycardia, c/o feeling warm, and dizzy while working with Pt. Given 500cc NS bolus, rectal temp was 99F. Family brought in medical marijuana. Pt had rectal temp 100.9, pancultured. Given another 1L bolus and started on maintenance fluids. Rectal temp 102. Started empirically on vanc/cipro/flagyl. Upgraded to telemetry.  : In AM patient had episode of emesis and was unresponsive to sternal rub. Dr. Huggins, neurointensivist at bedside. Repeat BP 60s/40s, tachycardic to 110s. Rapid response called. Levo gtt started. Upgraded to ICU. Intubated for airway protection. Likely septic shock given GNR in blood cx growing K.pneumoniae, Given additional 1L bolus x2. Abx changed to vanc and meropenem, ID following, rec to d/c vanc, c/w meropenem [id number], FOBT positive, keppra changed to vimpat 50BID, fentanyl 25mg q4hrs prn for vent dysynchrony, home lexapro decreased to 5mg, decadron decreased to 2q12, sucralfate increased to 1q6 iso GI bleed, pending repeat CBC, CMP, ABG, DIC labs, xray abdomen to r/o obstruction, propofol gtt started for sedation, weaning off precedex gtt, GI consulted rec protonix 40bid iso possible GI bleed, plan for possible EGD, albumin 1.7, 25% albumin 50ml given, s/p 1u PRBC and PLT repeat cbc hgb 8.0 from 6.6 and PLT 88 from 55. Protonix gtt started per GI recs.   : ANA LILIA overnight. Neuro stable. Hemoglobin 6.5, platelets 47 o/n, transfused 1uPRBC, 1u platelets in AM. Repeat Hb 7.1 and platelets 81. CBC repeated and Hb 7.2 and platelets 74. Arnie test negative. 1 set of surveillance blood cultures sent. NS increased to 120cc/hr. Holding off on CT C/A/P per nephrology recs due to concern about IV contrast. Temp 100.8 F, given tylenol. Additional 1u PRBC given in evening for Hb 6.8.     Vital Signs Last 24 Hrs  T(C): 37.3 (2023 05:13), Max: 38.2 (2023 08:53)  T(F): 99.1 (2023 05:13), Max: 100.8 (2023 16:00)  HR: 104 (2023 06:28) (90 - 113)  BP: 105/55 (2023 06:00) (95/54 - 120/57)  BP(mean): 74 (2023 06:) (70 - 86)  RR: 19 (:) (13 - 20)  SpO2: 100% (:28) (98% - 100%)    Parameters below as of 2023 06:28  Patient On (Oxygen Delivery Method): ventilator    O2 Concentration (%): 40    I&O's Detail    2023 07:01  -  2023 07:00  --------------------------------------------------------  IN:    Enteral Tube Flush: 200 mL    IV PiggyBack: 450 mL    Norepinephrine: 244.2 mL    Pantoprazole: 240 mL    Platelets - Single Donor: 454 mL    PRBCs (Packed Red Blood Cells): 258 mL    Propofol: 463.8 mL    sodium chloride 0.9%: 150 mL    sodium chloride 0.9%: 2640 mL  Total IN: 5100 mL    OUT:    Dexmedetomidine: 0 mL    Indwelling Catheter - Urethral (mL): 3515 mL    Nasogastric/Oral tube (mL): 1100 mL  Total OUT: 4615 mL    Total NET: 485 mL        I&O's Summary    2023 07:01  -  2023 07:00  --------------------------------------------------------  IN: 5100 mL / OUT: 4615 mL / NET: 485 mL      PHYSICAL EXAM:  General: Patient laying in bed, intubated, in no apparent distress. Sedation held for exam.   HEENT: 4mm pupils equal and reactive to light bilaterally.   Cardiovascular: Clear S1 and S2 without murmurs, gallops or rubs auscultated.  Respiratory: intubated on FVS, Clear to ausculation bilaterally without wheezing, rhonchi or rales.  GI: Soft, nontender, nondistended. Positive bowel sounds in all four quadrants.  Neuro: Not OE, grimacing to noxious stimuli, not FC. PERRL. RUE AG to noxious, LUE moves within plane of bed to noxious, b/l LE TF to noxious, +cough/gag/corneals  Extremities: 2+ Dorsalis Pedis and Posterior Tibial pulses bilaterally.    Incision/Wound:    DEVICE/DRAIN DRESSING:    TUBES/LINES:  [x] Campuzano  [] Lumbar Drain  [] Wound Drains  [x] Others: a-line, R IJ CVC    DIET:  [X] NPO  [] Mechanical  [] Tube feeds    LABS:                        7.0    3.34  )-----------( 117      ( 2023 04:13 )             21.0     07-31    136  |  110<H>  |  15  ----------------------------<  231<H>  3.7   |  18<L>  |  0.56    Ca    7.2<L>      2023 04:13  Phos  2.9     07-31  Mg     2.0     07-31    TPro  3.7<L>  /  Alb  1.8<L>  /  TBili  3.0<H>  /  DBili  1.5<H>  /  AST  31  /  ALT  22  /  AlkPhos  57  07-30    PT/INR - ( 2023 15:34 )   PT: 14.3 sec;   INR: 1.26          PTT - ( 2023 15:34 )  PTT:27.4 sec  Urinalysis Basic - ( 2023 04:13 )    Color: x / Appearance: x / SG: x / pH: x  Gluc: 231 mg/dL / Ketone: x  / Bili: x / Urobili: x   Blood: x / Protein: x / Nitrite: x   Leuk Esterase: x / RBC: x / WBC x   Sq Epi: x / Non Sq Epi: x / Bacteria: x      CARDIAC MARKERS ( 2023 06:28 )  x     / x     / 51 U/L / x     / 1.0 ng/mL      CAPILLARY BLOOD GLUCOSE      POCT Blood Glucose.: 230 mg/dL (2023 06:16)  POCT Blood Glucose.: 193 mg/dL (2023 22:17)  POCT Blood Glucose.: 194 mg/dL (2023 16:30)  POCT Blood Glucose.: 250 mg/dL (2023 12:30)      Drug Levels: [] N/A    CSF Analysis: [] N/A      Allergies    amoxicillin (Rash)    Intolerances      MEDICATIONS:  Antibiotics:  meropenem  IVPB 1000 milliGRAM(s) IV Intermittent every 8 hours    Neuro:  acetaminophen     Tablet .. 650 milliGRAM(s) Oral every 6 hours PRN  escitalopram 5 milliGRAM(s) Oral daily  fentaNYL    Injectable 25 MICROGram(s) IV Push every 2 hours PRN  lacosamide IVPB 50 milliGRAM(s) IV Intermittent every 12 hours  ondansetron Injectable 4 milliGRAM(s) IV Push every 6 hours PRN  propofol Infusion 20 MICROgram(s)/kG/Min IV Continuous <Continuous>    Anticoagulation:    OTHER:  atorvastatin 20 milliGRAM(s) Oral at bedtime  chlorhexidine 0.12% Liquid 15 milliLiter(s) Oral Mucosa every 12 hours  chlorhexidine 2% Cloths 1 Application(s) Topical <User Schedule>  dexAMETHasone  Injectable 2 milliGRAM(s) IV Push every 12 hours  fluticasone propionate 50 MICROgram(s)/spray Nasal Spray 1 Spray(s) Both Nostrils two times a day  insulin lispro (ADMELOG) corrective regimen sliding scale   SubCutaneous Before meals and at bedtime  marijuana, medical 2 Capsule(s) Oral every 6 hours PRN  norepinephrine Infusion 0.05 MICROgram(s)/kG/Min IV Continuous <Continuous>  pantoprazole Infusion 8 mG/Hr IV Continuous <Continuous>  psyllium Powder 1 Packet(s) Oral two times a day  sucralfate 1 Gram(s) Oral every 6 hours    IVF:  multivitamin 1 Tablet(s) Oral daily  sodium chloride 3 Gram(s) Oral every 6 hours  sodium chloride 0.9%. 1000 milliLiter(s) IV Continuous <Continuous>    CULTURES:  Culture Results:   No growth at 12 hours ( @ 12:52)  Culture Results:   Growth in anaerobic bottle: Klebsiella pneumoniae Susceptibility to  follow. ( @ 17:01)    RADIOLOGY & ADDITIONAL TESTS:      ASSESSMENT:  Pt is a 69YO male with a past medical history of type 2 diabetes, hyperlipidemia, iron deficiency anemia, tracheal stenosis s/p tracheostomy (which was closed by ENT 7 days ago); glioblastoma s/p resection 2022, and Avastin treatment 3/2023 presenting with expressive aphasia, nuasea and vomiting x1 day. CT shows new hyperdensity in left frontal parietal - surgical site c/f disease progression. Also hyponatremic to 118. Admitted for Na management. Hospital course complicated by concern for possible sepsis, ID following, hemodynamically stable.    PLAN:  NEURO:    CARDIOVASCULAR:    PULMONARY:    RENAL:    GI:    HEME:    ID:    ENDO:    DVT PROPHYLAXIS:  [] Venodynes                                [] Heparin/Lovenox    FALL RISK:  [] Low Risk                                    [] Impulsive    DISPOSITION:       Assessment:  Present when checked    []  GCS  E   V  M     Heart Failure: []Acute, [] acute on chronic , []chronic  Heart Failure:  [] Diastolic (HFpEF), [] Systolic (HFrEF), []Combined (HFpEF and HFrEF), [] RHF, [] Pulm HTN, [] Other    [] EUGENIA, [] ATN, [] AIN, [] other  [] CKD1, [] CKD2, [] CKD 3, [] CKD 4, [] CKD 5, []ESRD    Encephalopathy: [] Metabolic, [] Hepatic, [] toxic, [] Neurological, [] Other    Abnormal Nurtitional Status: [] malnurtition (see nutrition note), [ ]underweight: BMI < 19, [] morbid obesity: BMI >40, [] Cachexia    [] Sepsis  [] hypovolemic shock,[] cardiogenic shock, [] hemorrhagic shock, [] neuogenic shock  [] Acute Respiratory Failure  []Cerebral edema, [] Brain compression/ herniation,   [] Functional quadriplegia  [] Acute blood loss anemia   HPI:  Osiel Norwood is a 69YO male with a past medical history of type 2 diabetes, hyperlipidemia, iron deficiency anemia, tracheal stenosis s/p tracheostomy (which was closed by ENT 7 days ago); glioblastoma s/p resection 2022, and Avastin treatment 3/2023 presenting with altered mental status and weakness x2 days. Per family, they last saw him last night around 6pm when he was at baseline. Patient has intermittent expressive aphasia but is getting worse and now is persistent, and has intermittent nausea and vomiting since last night. Patient was supposed to receive an outpatient brain MRI on 2023.  Stroke code performed in  is negative for CVA. Head CT shows new hyperdensity in frontal parietal - surgical site. MRI is ordered to rule out tumor recurrence.   Per daughter at bedside, Entresto and Eliquis were stopped per his PC - cardiologist.     S/Overnight events:    ANA LILIA overnight. Neuro stable.     Hospital Course:   : Admitted. Na 115 in ED with repeat 118, started 3% at 30cc/hr, and salt tabs 3q6, stability CTH in ED showing Interval development of a 13 mm hyperdense nodule along the   inferior margin of the resection cavity which may represent progression   of disease with hemorrhage, repeat CTH stable, urine studies ordered  : Neuro stable, 12AM BMP Na114 3% increased to 40cc/hr, urine studies, pancx to r/o infection since pt is immunocompromised. Changed pantoprazole to sucralfate for GI ppx d/t thrombocytopenia. LE dopplers negative. DC'd 3%. ENT consulted to assess stoma, recommend follow up upon discharge with ENT, Repeat sodium 122. Restarted 3% at 30.  : ANA LILIA o/n neuro stable. remains on 3%@30cc/hr. Started florinef, increased 3% to 50cc/hr. Increased 3% to 75cc/hr.    7/10: continued current 3% rate o/n. Na 127 --> 125, cont 3% @75cc/hr, f/u repeat Na this evening, likely stepdown tomorrow. Pend dispo home with home PT/OT when able. Heme consulted for thrombocytopenia  : ANA LILIA o/n. Remains on 3% @75cc/hr. Decreased 3% to 50cc/hr. Started 1.2L fluid restriction. Repeat Na corrected 128  : ANA LILIA overnight, remains on 3%, SDU from ICU  : ANA LILIA overnight, neuro stable, on 3%@50, Am sodium 137, decreased 3% to 25cc/hr, repeat Na 138, decreased to 15cc/hr.  : ANA LILIA overnight, neuro stable, remains on 3%@15cc/hr  7/15: ANA LILIA overnight. Neuro exam stable. Pt remains on 3% saline.Sodium 134 this AM remains on 3%@25. Per nephrology recs hypertonic Na d'ceed, Na tabs 2q6, URENA 15g BID, cont florinef/ fluid restriction.Per nephrology Na >130 ok when ready for d/c   : ANA LILIA overnight, hypertonics d/c now on urea powder/1L fluid restriction/florinef and salt tabs per renal, hydrocortisone cream ordered for rash on back, rec for Home PT  : AN ALILIA overnight, following Na, renal following, ENT saw for hoarse voice rec followup with CT surgery for possible dilation, pending Home PT. Patient developed sudden onset severe headache. Hyperventilating with increased work of breathing. Wheezing in all lung fields to auscultation. Neuro intact on exam. Duoneb x1 ordered. Dilaudid 0.25 IV given for pain control. Subsequently patient developed nausea with multiple episodes of vomiting. Hypertensive with SBP in the 190s. Given hydralazine 10mg IVP, Zofran. Stroke code and rapid response called. STAT labs sent. CTH/CTA/CTP completed. Tachycardic to the 180s, consistent with SVT, remained hypertensive. Given 10 of labetalol, SVT broke. Transferred to Telemetry. WBC 23.43, lactate 8.5. Given 1L fluid bolus. Pan culture sent. Started empirically on Vanc/Meropenem.   : Put on eeg. Voiding while retaining urine, SC for 500cc. EEG +spikes, epilepsy consulted. CTA chest and CT A/P pending. Keppra increased 1g BID.   : Cont EEG, trend Na, possible NGT today if not able to tolerate PO. Blood cx growing GS + cocci in clusters, f/u MRSA swab and pan cx, cont meropenem and vancomycin. Vanc trough in am. F/u urine studies.   : Off EEG, neuro exam improved. ID following for concern for sepsis, likely aerobic blood cx bottle staph epi contaminant, cont monitoring off of abx, possible drug reaction as cause. F/u surveillance blood cx. Hyponatremia, repeat Na 129 overnight from 130, cont current regimen and f/u am labs and nephrology recs. AM na 129 continuing fluid restriction, nephro recommending restarting urena, but holding due to possible drug reaction previously.   : neurologically stable, c/w fluid restriction, f/u AM Na, urine osm, urine Na, cortisol. Given 15mg of tolvaptan. Fluid restriction, florinef d/c'd per nephro recs. Held salt tabs for today. 6pm BMP Na 122, 10pm BMP Na 131, urine osm 141  : ANA LILIA ovn, neuro stable. AM Na 133. Restarted salt tabs 3q6 and 1L fluid restriction. Lantus 10u at bedtime added. 9:30pm BMP per nephro Na 138  : ANA LILIA ovn, neuro stable. Na stable, discussed with nephrology can cont tolvaptan 15mg daily PRN for hyponatremia, only needed for hypoNa, can discontinue fluid restriction. Pt evaluated at bedside after nurse noted patient to have some expressive aphasia and perseverating; patient oriented to self, unable to say hospital or year, following all commands and DUMONT 5/5 strength, no drift, no facial droop, perseverating on pt's  when asked other questions. Pt afebrile, vitals stable, cont keppra 1g BID, cont decadron 4mg BID, discussed with Dr. Dorado, defer CTH at this time and monitor clinically. . Lantus increased to 14u at bedtime.  : o/n PSVT 13 beats followed by sinus tachycardia in setting of anxiety and net negative fluid balance.  Resolved to 102 with verbal comfort and further resolved with 500 cc NS. Lantus increased to 18u qhs. Dr. Costa consulted.  : ANA LILIA overnight. Neuro stable.   : ANA LILIA overnight. Neuro exam stable. Dispo pending.  : multiple BMs o/n. Na stable. Lexapro increased to 10mg daily for worsening depression. Held bowel regimen 2/2 multiple stools.   : ANA LILIA overnight, Na 133 f/u AM labs, pending [place]. Pt has episode of sustained tachycardia, c/o feeling warm, and dizzy while working with Pt. Given 500cc NS bolus, rectal temp was 99F. Family brought in medical marijuana. Pt had rectal temp 100.9, pancultured. Given another 1L bolus and started on maintenance fluids. Rectal temp 102. Started empirically on vanc/cipro/flagyl. Upgraded to telemetry.  : In AM patient had episode of emesis and was unresponsive to sternal rub. Dr. Huggins, neurointensivist at bedside. Repeat BP 60s/40s, tachycardic to 110s. Rapid response called. Levo gtt started. Upgraded to ICU. Intubated for airway protection. Likely septic shock given GNR in blood cx growing K.pneumoniae, Given additional 1L bolus x2. Abx changed to vanc and meropenem, ID following, rec to d/c vanc, c/w meropenem [id number], FOBT positive, keppra changed to vimpat 50BID, fentanyl 25mg q4hrs prn for vent dysynchrony, home lexapro decreased to 5mg, decadron decreased to 2q12, sucralfate increased to 1q6 iso GI bleed, pending repeat CBC, CMP, ABG, DIC labs, xray abdomen to r/o obstruction, propofol gtt started for sedation, weaning off precedex gtt, GI consulted rec protonix 40bid iso possible GI bleed, plan for possible EGD, albumin 1.7, 25% albumin 50ml given, s/p 1u PRBC and PLT repeat cbc hgb 8.0 from 6.6 and PLT 88 from 55. Protonix gtt started per GI recs.   : ANA LILIA overnight. Neuro stable. Hemoglobin 6.5, platelets 47 o/n, transfused 1uPRBC, 1u platelets in AM. Repeat Hb 7.1 and platelets 81. CBC repeated and Hb 7.2 and platelets 74. Arnie test negative. 1 set of surveillance blood cultures sent. NS increased to 120cc/hr. Holding off on CT C/A/P per nephrology recs due to concern about IV contrast. Temp 100.8 F, given tylenol. Additional 1u PRBC given in evening for Hb 6.8.     Vital Signs Last 24 Hrs  T(C): 37.3 (2023 05:13), Max: 38.2 (2023 08:53)  T(F): 99.1 (2023 05:13), Max: 100.8 (2023 16:00)  HR: 104 (2023 06:28) (90 - 113)  BP: 105/55 (2023 06:00) (95/54 - 120/57)  BP(mean): 74 (2023 06:) (70 - 86)  RR: 19 (:) (13 - 20)  SpO2: 100% (:28) (98% - 100%)    Parameters below as of 2023 06:28  Patient On (Oxygen Delivery Method): ventilator    O2 Concentration (%): 40    I&O's Detail    2023 07:01  -  2023 07:00  --------------------------------------------------------  IN:    Enteral Tube Flush: 200 mL    IV PiggyBack: 450 mL    Norepinephrine: 244.2 mL    Pantoprazole: 240 mL    Platelets - Single Donor: 454 mL    PRBCs (Packed Red Blood Cells): 258 mL    Propofol: 463.8 mL    sodium chloride 0.9%: 150 mL    sodium chloride 0.9%: 2640 mL  Total IN: 5100 mL    OUT:    Dexmedetomidine: 0 mL    Indwelling Catheter - Urethral (mL): 3515 mL    Nasogastric/Oral tube (mL): 1100 mL  Total OUT: 4615 mL    Total NET: 485 mL        I&O's Summary    2023 07:01  -  2023 07:00  --------------------------------------------------------  IN: 5100 mL / OUT: 4615 mL / NET: 485 mL      PHYSICAL EXAM:  General: Patient laying in bed, intubated, in no apparent distress. Sedation held for exam.   HEENT: 4mm pupils equal and reactive to light bilaterally.   Cardiovascular: Clear S1 and S2 without murmurs, gallops or rubs auscultated.  Respiratory: intubated on FVS, Clear to ausculation bilaterally without wheezing, rhonchi or rales.  GI: Soft, nontender, nondistended. Positive bowel sounds in all four quadrants.  Neuro: Not OE, grimacing to noxious stimuli, not FC. PERRL. RUE AG to noxious, LUE moves within plane of bed to noxious, b/l LE TF to noxious, +cough/gag/corneals  Extremities: 2+ Dorsalis Pedis and Posterior Tibial pulses bilaterally.    Incision/Wound:    DEVICE/DRAIN DRESSING:    TUBES/LINES:  [x] Gonzalez  [] Lumbar Drain  [] Wound Drains  [x] Others: a-line, R IJ CVC    DIET:  [X] NPO  [] Mechanical  [] Tube feeds    LABS:                        7.0    3.34  )-----------( 117      ( 2023 04:13 )             21.0     07-31    136  |  110<H>  |  15  ----------------------------<  231<H>  3.7   |  18<L>  |  0.56    Ca    7.2<L>      2023 04:13  Phos  2.9     07-31  Mg     2.0     07-31    TPro  3.7<L>  /  Alb  1.8<L>  /  TBili  3.0<H>  /  DBili  1.5<H>  /  AST  31  /  ALT  22  /  AlkPhos  57  07-30    PT/INR - ( 2023 15:34 )   PT: 14.3 sec;   INR: 1.26          PTT - ( 2023 15:34 )  PTT:27.4 sec  Urinalysis Basic - ( 2023 04:13 )    Color: x / Appearance: x / SG: x / pH: x  Gluc: 231 mg/dL / Ketone: x  / Bili: x / Urobili: x   Blood: x / Protein: x / Nitrite: x   Leuk Esterase: x / RBC: x / WBC x   Sq Epi: x / Non Sq Epi: x / Bacteria: x      CARDIAC MARKERS ( 2023 06:28 )  x     / x     / 51 U/L / x     / 1.0 ng/mL      CAPILLARY BLOOD GLUCOSE      POCT Blood Glucose.: 230 mg/dL (2023 06:16)  POCT Blood Glucose.: 193 mg/dL (2023 22:17)  POCT Blood Glucose.: 194 mg/dL (2023 16:30)  POCT Blood Glucose.: 250 mg/dL (2023 12:30)      Drug Levels: [] N/A    CSF Analysis: [] N/A      Allergies    amoxicillin (Rash)    Intolerances      MEDICATIONS:  Antibiotics:  meropenem  IVPB 1000 milliGRAM(s) IV Intermittent every 8 hours    Neuro:  acetaminophen     Tablet .. 650 milliGRAM(s) Oral every 6 hours PRN  escitalopram 5 milliGRAM(s) Oral daily  fentaNYL    Injectable 25 MICROGram(s) IV Push every 2 hours PRN  lacosamide IVPB 50 milliGRAM(s) IV Intermittent every 12 hours  ondansetron Injectable 4 milliGRAM(s) IV Push every 6 hours PRN  propofol Infusion 20 MICROgram(s)/kG/Min IV Continuous <Continuous>    Anticoagulation:    OTHER:  atorvastatin 20 milliGRAM(s) Oral at bedtime  chlorhexidine 0.12% Liquid 15 milliLiter(s) Oral Mucosa every 12 hours  chlorhexidine 2% Cloths 1 Application(s) Topical <User Schedule>  dexAMETHasone  Injectable 2 milliGRAM(s) IV Push every 12 hours  fluticasone propionate 50 MICROgram(s)/spray Nasal Spray 1 Spray(s) Both Nostrils two times a day  insulin lispro (ADMELOG) corrective regimen sliding scale   SubCutaneous Before meals and at bedtime  marijuana, medical 2 Capsule(s) Oral every 6 hours PRN  norepinephrine Infusion 0.05 MICROgram(s)/kG/Min IV Continuous <Continuous>  pantoprazole Infusion 8 mG/Hr IV Continuous <Continuous>  psyllium Powder 1 Packet(s) Oral two times a day  sucralfate 1 Gram(s) Oral every 6 hours    IVF:  multivitamin 1 Tablet(s) Oral daily  sodium chloride 3 Gram(s) Oral every 6 hours  sodium chloride 0.9%. 1000 milliLiter(s) IV Continuous <Continuous>    CULTURES:  Culture Results:   No growth at 12 hours ( @ 12:52)  Culture Results:   Growth in anaerobic bottle: Klebsiella pneumoniae Susceptibility to  follow. ( @ 17:01)    RADIOLOGY & ADDITIONAL TESTS:      ASSESSMENT:  Pt is a 69YO male with a past medical history of type 2 diabetes, hyperlipidemia, iron deficiency anemia, tracheal stenosis s/p tracheostomy (which was closed by ENT 7 days ago); glioblastoma s/p resection 2022, and Avastin treatment 3/2023 presenting with expressive aphasia, nausea and vomiting x1 day. CT shows new hyperdensity in left frontal parietal - surgical site c/f disease progression. Also hyponatremic to 118. Admitted for Na management. Hospital course complicated by concern for possible sepsis, ID following, hemodynamically stable.    PLAN:  Neuro:  -neuro q4h/vitals q1hrs   -CTH : Interval development of a 13 mm hyperdense nodule along the inferior margin of the resection cavity which may represent progression of disease with hemorrhage. Repeat CTH stable, CTH  stable   -CTA : negative, CTA  stable  -CTP  stable.   -pain control prn, fentanyl IVP prn  -vimpat 50BID switched from keppra  thrombocytopenia  -Decadron 2mg BID  -c/w home Lexapro 5mg (home dose 5mg)  -home ASA 81 held i/s/o thrombocytopenia   -MRI brain  suspicious for recurrent tumor and hydro   -palliative following   -propofol gtt for sedation    Pulm:  - VC/AC 14/400/45/5  - monitor respiratory alkalosis    Cardio:  --160  -  outpatient echo EF 55%  - levo gtt    GI:  - OG tube to IWS  - bowel regimen held for loose stools, LBM   - protonix gtt started for possible GI bleed  - sucralfate 1q6 while on steroids  - medical marijuana  - GI following    Renal/:  - Hyponatremic (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  - Trend BMP  - Maintenance fluids @ 120cc/hr  - salt tabs 3q6   - +gonzalez for accurate I+Os  - off NS for pulm edema on CXR; off hypertonics since 7/15  - s/p tolvaptan 15mg   - dc'd urea powder 15g BID started per Renal d/t petechial rash       Endo:  -ISS while on Decadron  -A1c 5.7  -lantus 18u d/c'd  -h/o T2DM: home Januvia held  -h/o HLD: c/w Atorvastatin 20mg    Heme:  -SCDs for DVT ppx, SQL and Aspirin 81 held for thrombocytopenia   -heme consulted for thrombocytopenia- w/u sent, likely chronic thrombocytopenia d/t chemotherapy agent in past   -Per heme transfuse 1U plt if bleeding and platelet count <50k, if febrile and platelet count <20k  -1u PRBC, 1U PLT , 2u PRBC, 1u PLT   -LE dopplers  negative  -LUE doppler : L superficial cephalic vein thrombophlebitis  -CTA PE protocol for tachycardia: negative     ID:  - Pancultured , bcx growing K. pneumoniae, f/u blood cx  (no growth at 12 hours)  - F/u sputum culture   - meropenem 1g q8hrs (-)  - Febrile, leukocytosis and tachycardia with elevated lactic acid on   - vancomycin/meropenem empirically (-), d/c per ID and repeat blood cx , NGTD    Dispo: NSICU status, full code, AR    D/w Dr. Dorado      Assessment:  Present when checked    []  GCS  E   V  M     Heart Failure: []Acute, [] acute on chronic , []chronic  Heart Failure:  [] Diastolic (HFpEF), [] Systolic (HFrEF), []Combined (HFpEF and HFrEF), [] RHF, [] Pulm HTN, [] Other    [] EUGENIA, [] ATN, [] AIN, [] other  [] CKD1, [] CKD2, [] CKD 3, [] CKD 4, [] CKD 5, []ESRD    Encephalopathy: [] Metabolic, [] Hepatic, [] toxic, [] Neurological, [] Other    Abnormal Nurtitional Status: [] malnurtition (see nutrition note), [ ]underweight: BMI < 19, [] morbid obesity: BMI >40, [] Cachexia    [] Sepsis  [] hypovolemic shock,[] cardiogenic shock, [] hemorrhagic shock, [] neuogenic shock  [] Acute Respiratory Failure  []Cerebral edema, [] Brain compression/ herniation,   [] Functional quadriplegia  [] Acute blood loss anemia   HPI:  Osiel Norwood is a 69YO male with a past medical history of type 2 diabetes, hyperlipidemia, iron deficiency anemia, tracheal stenosis s/p tracheostomy (which was closed by ENT 7 days ago); glioblastoma s/p resection 2022, and Avastin treatment 3/2023 presenting with altered mental status and weakness x2 days. Per family, they last saw him last night around 6pm when he was at baseline. Patient has intermittent expressive aphasia but is getting worse and now is persistent, and has intermittent nausea and vomiting since last night. Patient was supposed to receive an outpatient brain MRI on 2023.  Stroke code performed in  is negative for CVA. Head CT shows new hyperdensity in frontal parietal - surgical site. MRI is ordered to rule out tumor recurrence.   Per daughter at bedside, Entresto and Eliquis were stopped per his PC - cardiologist.     S/Overnight events:    ANA LILIA overnight. Neuro stable.     Hospital Course:   : Admitted. Na 115 in ED with repeat 118, started 3% at 30cc/hr, and salt tabs 3q6, stability CTH in ED showing Interval development of a 13 mm hyperdense nodule along the   inferior margin of the resection cavity which may represent progression   of disease with hemorrhage, repeat CTH stable, urine studies ordered  : Neuro stable, 12AM BMP Na114 3% increased to 40cc/hr, urine studies, pancx to r/o infection since pt is immunocompromised. Changed pantoprazole to sucralfate for GI ppx d/t thrombocytopenia. LE dopplers negative. DC'd 3%. ENT consulted to assess stoma, recommend follow up upon discharge with ENT, Repeat sodium 122. Restarted 3% at 30.  : ANA LILIA o/n neuro stable. remains on 3%@30cc/hr. Started florinef, increased 3% to 50cc/hr. Increased 3% to 75cc/hr.    7/10: continued current 3% rate o/n. Na 127 --> 125, cont 3% @75cc/hr, f/u repeat Na this evening, likely stepdown tomorrow. Pend dispo home with home PT/OT when able. Heme consulted for thrombocytopenia  : ANA LILIA o/n. Remains on 3% @75cc/hr. Decreased 3% to 50cc/hr. Started 1.2L fluid restriction. Repeat Na corrected 128  : ANA LILIA overnight, remains on 3%, SDU from ICU  : ANA LILIA overnight, neuro stable, on 3%@50, Am sodium 137, decreased 3% to 25cc/hr, repeat Na 138, decreased to 15cc/hr.  : ANA LILIA overnight, neuro stable, remains on 3%@15cc/hr  7/15: ANA LILIA overnight. Neuro exam stable. Pt remains on 3% saline.Sodium 134 this AM remains on 3%@25. Per nephrology recs hypertonic Na d'ceed, Na tabs 2q6, URENA 15g BID, cont florinef/ fluid restriction.Per nephrology Na >130 ok when ready for d/c   : ANA LILIA overnight, hypertonics d/c now on urea powder/1L fluid restriction/florinef and salt tabs per renal, hydrocortisone cream ordered for rash on back, rec for Home PT  : ANA LILIA overnight, following Na, renal following, ENT saw for hoarse voice rec followup with CT surgery for possible dilation, pending Home PT. Patient developed sudden onset severe headache. Hyperventilating with increased work of breathing. Wheezing in all lung fields to auscultation. Neuro intact on exam. Duoneb x1 ordered. Dilaudid 0.25 IV given for pain control. Subsequently patient developed nausea with multiple episodes of vomiting. Hypertensive with SBP in the 190s. Given hydralazine 10mg IVP, Zofran. Stroke code and rapid response called. STAT labs sent. CTH/CTA/CTP completed. Tachycardic to the 180s, consistent with SVT, remained hypertensive. Given 10 of labetalol, SVT broke. Transferred to Telemetry. WBC 23.43, lactate 8.5. Given 1L fluid bolus. Pan culture sent. Started empirically on Vanc/Meropenem.   : Put on eeg. Voiding while retaining urine, SC for 500cc. EEG +spikes, epilepsy consulted. CTA chest and CT A/P pending. Keppra increased 1g BID.   : Cont EEG, trend Na, possible NGT today if not able to tolerate PO. Blood cx growing GS + cocci in clusters, f/u MRSA swab and pan cx, cont meropenem and vancomycin. Vanc trough in am. F/u urine studies.   : Off EEG, neuro exam improved. ID following for concern for sepsis, likely aerobic blood cx bottle staph epi contaminant, cont monitoring off of abx, possible drug reaction as cause. F/u surveillance blood cx. Hyponatremia, repeat Na 129 overnight from 130, cont current regimen and f/u am labs and nephrology recs. AM na 129 continuing fluid restriction, nephro recommending restarting urena, but holding due to possible drug reaction previously.   : neurologically stable, c/w fluid restriction, f/u AM Na, urine osm, urine Na, cortisol. Given 15mg of tolvaptan. Fluid restriction, florinef d/c'd per nephro recs. Held salt tabs for today. 6pm BMP Na 122, 10pm BMP Na 131, urine osm 141  : ANA LILIA ovn, neuro stable. AM Na 133. Restarted salt tabs 3q6 and 1L fluid restriction. Lantus 10u at bedtime added. 9:30pm BMP per nephro Na 138  : ANA LILIA ovn, neuro stable. Na stable, discussed with nephrology can cont tolvaptan 15mg daily PRN for hyponatremia, only needed for hypoNa, can discontinue fluid restriction. Pt evaluated at bedside after nurse noted patient to have some expressive aphasia and perseverating; patient oriented to self, unable to say hospital or year, following all commands and DUMONT 5/5 strength, no drift, no facial droop, perseverating on pt's  when asked other questions. Pt afebrile, vitals stable, cont keppra 1g BID, cont decadron 4mg BID, discussed with Dr. Dorado, defer CTH at this time and monitor clinically. . Lantus increased to 14u at bedtime.  : o/n PSVT 13 beats followed by sinus tachycardia in setting of anxiety and net negative fluid balance.  Resolved to 102 with verbal comfort and further resolved with 500 cc NS. Lantus increased to 18u qhs. Dr. Costa consulted.  : ANA LILIA overnight. Neuro stable.   : ANA LILIA overnight. Neuro exam stable. Dispo pending.  : multiple BMs o/n. Na stable. Lexapro increased to 10mg daily for worsening depression. Held bowel regimen 2/2 multiple stools.   : ANA LILIA overnight, Na 133 f/u AM labs, pending [place]. Pt has episode of sustained tachycardia, c/o feeling warm, and dizzy while working with Pt. Given 500cc NS bolus, rectal temp was 99F. Family brought in medical marijuana. Pt had rectal temp 100.9, pancultured. Given another 1L bolus and started on maintenance fluids. Rectal temp 102. Started empirically on vanc/cipro/flagyl. Upgraded to telemetry.  : In AM patient had episode of emesis and was unresponsive to sternal rub. Dr. Huggins, neurointensivist at bedside. Repeat BP 60s/40s, tachycardic to 110s. Rapid response called. Levo gtt started. Upgraded to ICU. Intubated for airway protection. Likely septic shock given GNR in blood cx growing K.pneumoniae, Given additional 1L bolus x2. Abx changed to vanc and meropenem, ID following, rec to d/c vanc, c/w meropenem [id number], FOBT positive, keppra changed to vimpat 50BID, fentanyl 25mg q4hrs prn for vent dysynchrony, home lexapro decreased to 5mg, decadron decreased to 2q12, sucralfate increased to 1q6 iso GI bleed, pending repeat CBC, CMP, ABG, DIC labs, xray abdomen to r/o obstruction, propofol gtt started for sedation, weaning off precedex gtt, GI consulted rec protonix 40bid iso possible GI bleed, plan for possible EGD, albumin 1.7, 25% albumin 50ml given, s/p 1u PRBC and PLT repeat cbc hgb 8.0 from 6.6 and PLT 88 from 55. Protonix gtt started per GI recs.   : ANA LILIA overnight. Neuro stable. Hemoglobin 6.5, platelets 47 o/n, transfused 1uPRBC, 1u platelets in AM. Repeat Hb 7.1 and platelets 81. CBC repeated and Hb 7.2 and platelets 74. Arnie test negative. 1 set of surveillance blood cultures sent. NS increased to 120cc/hr. Holding off on CT C/A/P per nephrology recs due to concern about IV contrast. Temp 100.8 F, given tylenol. Additional 1u PRBC given in evening for Hb 6.8.     Vital Signs Last 24 Hrs  T(C): 37.3 (2023 05:13), Max: 38.2 (2023 08:53)  T(F): 99.1 (2023 05:13), Max: 100.8 (2023 16:00)  HR: 104 (2023 06:28) (90 - 113)  BP: 105/55 (2023 06:00) (95/54 - 120/57)  BP(mean): 74 (2023 06:) (70 - 86)  RR: 19 (:) (13 - 20)  SpO2: 100% (:28) (98% - 100%)    Parameters below as of 2023 06:28  Patient On (Oxygen Delivery Method): ventilator    O2 Concentration (%): 40    I&O's Detail    2023 07:01  -  2023 07:00  --------------------------------------------------------  IN:    Enteral Tube Flush: 200 mL    IV PiggyBack: 450 mL    Norepinephrine: 244.2 mL    Pantoprazole: 240 mL    Platelets - Single Donor: 454 mL    PRBCs (Packed Red Blood Cells): 258 mL    Propofol: 463.8 mL    sodium chloride 0.9%: 150 mL    sodium chloride 0.9%: 2640 mL  Total IN: 5100 mL    OUT:    Dexmedetomidine: 0 mL    Indwelling Catheter - Urethral (mL): 3515 mL    Nasogastric/Oral tube (mL): 1100 mL  Total OUT: 4615 mL    Total NET: 485 mL        I&O's Summary    2023 07:01  -  2023 07:00  --------------------------------------------------------  IN: 5100 mL / OUT: 4615 mL / NET: 485 mL      PHYSICAL EXAM:  General: Patient laying in bed, intubated, in no apparent distress. Sedation held for exam.   HEENT: 4mm pupils equal and reactive to light bilaterally.   Cardiovascular: Clear S1 and S2 without murmurs, gallops or rubs auscultated.  Respiratory: intubated on FVS, Clear to ausculation bilaterally without wheezing, rhonchi or rales.  GI: Soft, nontender, nondistended. Positive bowel sounds in all four quadrants.  Neuro: Not OE, grimacing to noxious stimuli, not FC. PERRL. Upper extremities AG to noxious bilaterally,  b/l LE withdrawal to noxious, +cough/gag/corneals  Extremities: 2+ Dorsalis Pedis and Posterior Tibial pulses bilaterally.    Incision/Wound:    DEVICE/DRAIN DRESSING:    TUBES/LINES:  [x] Gonzalez  [] Lumbar Drain  [] Wound Drains  [x] Others: a-line, R IJ CVC    DIET:  [X] NPO  [] Mechanical  [] Tube feeds    LABS:                        7.0    3.34  )-----------( 117      ( 2023 04:13 )             21.0     07-31    136  |  110<H>  |  15  ----------------------------<  231<H>  3.7   |  18<L>  |  0.56    Ca    7.2<L>      2023 04:13  Phos  2.9     07-31  Mg     2.0     07-31    TPro  3.7<L>  /  Alb  1.8<L>  /  TBili  3.0<H>  /  DBili  1.5<H>  /  AST  31  /  ALT  22  /  AlkPhos  57  07-30    PT/INR - ( 2023 15:34 )   PT: 14.3 sec;   INR: 1.26          PTT - ( 2023 15:34 )  PTT:27.4 sec  Urinalysis Basic - ( 2023 04:13 )    Color: x / Appearance: x / SG: x / pH: x  Gluc: 231 mg/dL / Ketone: x  / Bili: x / Urobili: x   Blood: x / Protein: x / Nitrite: x   Leuk Esterase: x / RBC: x / WBC x   Sq Epi: x / Non Sq Epi: x / Bacteria: x      CARDIAC MARKERS ( 2023 06:28 )  x     / x     / 51 U/L / x     / 1.0 ng/mL      CAPILLARY BLOOD GLUCOSE      POCT Blood Glucose.: 230 mg/dL (2023 06:16)  POCT Blood Glucose.: 193 mg/dL (2023 22:17)  POCT Blood Glucose.: 194 mg/dL (2023 16:30)  POCT Blood Glucose.: 250 mg/dL (2023 12:30)      Drug Levels: [] N/A    CSF Analysis: [] N/A      Allergies    amoxicillin (Rash)    Intolerances      MEDICATIONS:  Antibiotics:  meropenem  IVPB 1000 milliGRAM(s) IV Intermittent every 8 hours    Neuro:  acetaminophen     Tablet .. 650 milliGRAM(s) Oral every 6 hours PRN  escitalopram 5 milliGRAM(s) Oral daily  fentaNYL    Injectable 25 MICROGram(s) IV Push every 2 hours PRN  lacosamide IVPB 50 milliGRAM(s) IV Intermittent every 12 hours  ondansetron Injectable 4 milliGRAM(s) IV Push every 6 hours PRN  propofol Infusion 20 MICROgram(s)/kG/Min IV Continuous <Continuous>    Anticoagulation:    OTHER:  atorvastatin 20 milliGRAM(s) Oral at bedtime  chlorhexidine 0.12% Liquid 15 milliLiter(s) Oral Mucosa every 12 hours  chlorhexidine 2% Cloths 1 Application(s) Topical <User Schedule>  dexAMETHasone  Injectable 2 milliGRAM(s) IV Push every 12 hours  fluticasone propionate 50 MICROgram(s)/spray Nasal Spray 1 Spray(s) Both Nostrils two times a day  insulin lispro (ADMELOG) corrective regimen sliding scale   SubCutaneous Before meals and at bedtime  marijuana, medical 2 Capsule(s) Oral every 6 hours PRN  norepinephrine Infusion 0.05 MICROgram(s)/kG/Min IV Continuous <Continuous>  pantoprazole Infusion 8 mG/Hr IV Continuous <Continuous>  psyllium Powder 1 Packet(s) Oral two times a day  sucralfate 1 Gram(s) Oral every 6 hours    IVF:  multivitamin 1 Tablet(s) Oral daily  sodium chloride 3 Gram(s) Oral every 6 hours  sodium chloride 0.9%. 1000 milliLiter(s) IV Continuous <Continuous>    CULTURES:  Culture Results:   No growth at 12 hours ( @ 12:52)  Culture Results:   Growth in anaerobic bottle: Klebsiella pneumoniae Susceptibility to  follow. ( @ 17:01)    RADIOLOGY & ADDITIONAL TESTS:      ASSESSMENT:  Pt is a 69YO male with a past medical history of type 2 diabetes, hyperlipidemia, iron deficiency anemia, tracheal stenosis s/p tracheostomy (which was closed by ENT 7 days ago); glioblastoma s/p resection 2022, and Avastin treatment 3/2023 presenting with expressive aphasia, nausea and vomiting x1 day. CT shows new hyperdensity in left frontal parietal - surgical site c/f disease progression. Also hyponatremic to 118. Admitted for Na management. Hospital course complicated by concern for possible sepsis, ID following, hemodynamically stable.    PLAN:  Neuro:  -neuro q4h/vitals q1hrs   -CTH : Interval development of a 13 mm hyperdense nodule along the inferior margin of the resection cavity which may represent progression of disease with hemorrhage. Repeat CTH stable, CTH  stable   -CTA : negative, CTA  stable  -CTP  stable.   -pain control prn, fentanyl IVP prn  -vimpat 50BID switched from keppra  thrombocytopenia  -Decadron 2mg BID  -c/w home Lexapro 5mg (home dose 5mg)  -home ASA 81 held i/s/o thrombocytopenia   -MRI brain  suspicious for recurrent tumor and hydro   -palliative following   -propofol gtt for sedation    Pulm:  - VC/AC 14/400/45/5  - monitor respiratory alkalosis    Cardio:  -MAP Goal 65  -  outpatient echo EF 55%  - pending repeat ECHO  - levo gtt    GI:  - OG tube to IWS  - F/u IV clearance for TPN??  - bowel regimen held for loose stools, LBM   - protonix gtt started for possible GI bleed  - sucralfate 1q6 while on steroids  - medical marijuana D/C'd  - Ferritin labs ordered   - GI following    Renal/:  - Hyponatremic (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  - Trend BMP  - Maintenance fluids @ 120cc/hr  - salt tabs 3q6   - +gonzalez for accurate I+Os  - off NS for pulm edema on CXR; off hypertonics since 7/15  - s/p tolvaptan 15mg   - dc'd urea powder 15g BID started per Renal d/t petechial rash       Endo:  -ISS while on Decadron  -A1c 5.7  -lantus 6u  - AM Cortisol to r/o Adrenal insufficiency   -h/o T2DM: home Januvia held  -h/o HLD: c/w Atorvastatin 20mg    Heme:  -SCDs for DVT ppx, SQL and Aspirin 81 held for thrombocytopenia   -heme consulted for thrombocytopenia- w/u sent, likely chronic thrombocytopenia d/t chemotherapy agent in past   -Per heme transfuse 1U plt if bleeding and platelet count <50k, if febrile and platelet count <20k  -1u PRBC, 1U PLT , 2u PRBC, 1u PLT   - F/U Heme consult: Ferritin ordered to r/o HLH per GI??  -LE dopplers  negative  -LUE doppler : L superficial cephalic vein thrombophlebitis  -CTA PE protocol for tachycardia: negative     ID:  - Pancultured , bcx growing K. pneumoniae, f/u blood cx  (no growth at 12 hours)  - F/u sputum culture   - meropenem 1g q8hrs (-)  - Febrile, leukocytosis and tachycardia with elevated lactic acid on   - vancomycin/meropenem empirically (-), d/c per ID and repeat blood cx , NGTD    Dispo: NSICU status, full code, AR    D/w Dr. Dorado      Assessment:  Present when checked    []  GCS  E   V  M     Heart Failure: []Acute, [] acute on chronic , []chronic  Heart Failure:  [] Diastolic (HFpEF), [] Systolic (HFrEF), []Combined (HFpEF and HFrEF), [] RHF, [] Pulm HTN, [] Other    [] EUGENIA, [] ATN, [] AIN, [] other  [] CKD1, [] CKD2, [] CKD 3, [] CKD 4, [] CKD 5, []ESRD    Encephalopathy: [] Metabolic, [] Hepatic, [] toxic, [] Neurological, [] Other    Abnormal Nurtitional Status: [] malnurtition (see nutrition note), [ ]underweight: BMI < 19, [] morbid obesity: BMI >40, [] Cachexia    [] Sepsis  [] hypovolemic shock,[] cardiogenic shock, [] hemorrhagic shock, [] neuogenic shock  [] Acute Respiratory Failure  []Cerebral edema, [] Brain compression/ herniation,   [] Functional quadriplegia  [] Acute blood loss anemia   HPI:  Osiel Norwood is a 67YO male with a past medical history of type 2 diabetes, hyperlipidemia, iron deficiency anemia, tracheal stenosis s/p tracheostomy (which was closed by ENT 7 days ago); glioblastoma s/p resection 2022, and Avastin treatment 3/2023 presenting with altered mental status and weakness x2 days. Per family, they last saw him last night around 6pm when he was at baseline. Patient has intermittent expressive aphasia but is getting worse and now is persistent, and has intermittent nausea and vomiting since last night. Patient was supposed to receive an outpatient brain MRI on 2023.  Stroke code performed in  is negative for CVA. Head CT shows new hyperdensity in frontal parietal - surgical site. MRI is ordered to rule out tumor recurrence.   Per daughter at bedside, Entresto and Eliquis were stopped per his PC - cardiologist.     S/Overnight events:    ANA LILIA overnight. Neuro stable.     Hospital Course:   : Admitted. Na 115 in ED with repeat 118, started 3% at 30cc/hr, and salt tabs 3q6, stability CTH in ED showing Interval development of a 13 mm hyperdense nodule along the   inferior margin of the resection cavity which may represent progression   of disease with hemorrhage, repeat CTH stable, urine studies ordered  : Neuro stable, 12AM BMP Na114 3% increased to 40cc/hr, urine studies, pancx to r/o infection since pt is immunocompromised. Changed pantoprazole to sucralfate for GI ppx d/t thrombocytopenia. LE dopplers negative. DC'd 3%. ENT consulted to assess stoma, recommend follow up upon discharge with ENT, Repeat sodium 122. Restarted 3% at 30.  : ANA LILIA o/n neuro stable. remains on 3%@30cc/hr. Started florinef, increased 3% to 50cc/hr. Increased 3% to 75cc/hr.    7/10: continued current 3% rate o/n. Na 127 --> 125, cont 3% @75cc/hr, f/u repeat Na this evening, likely stepdown tomorrow. Pend dispo home with home PT/OT when able. Heme consulted for thrombocytopenia  : ANA LILIA o/n. Remains on 3% @75cc/hr. Decreased 3% to 50cc/hr. Started 1.2L fluid restriction. Repeat Na corrected 128  : ANA LILIA overnight, remains on 3%, SDU from ICU  : ANA LILIA overnight, neuro stable, on 3%@50, Am sodium 137, decreased 3% to 25cc/hr, repeat Na 138, decreased to 15cc/hr.  : ANA LILIA overnight, neuro stable, remains on 3%@15cc/hr  7/15: ANA LILIA overnight. Neuro exam stable. Pt remains on 3% saline.Sodium 134 this AM remains on 3%@25. Per nephrology recs hypertonic Na d'ceed, Na tabs 2q6, URENA 15g BID, cont florinef/ fluid restriction.Per nephrology Na >130 ok when ready for d/c   : ANA LILIA overnight, hypertonics d/c now on urea powder/1L fluid restriction/florinef and salt tabs per renal, hydrocortisone cream ordered for rash on back, rec for Home PT  : ANA LILIA overnight, following Na, renal following, ENT saw for hoarse voice rec followup with CT surgery for possible dilation, pending Home PT. Patient developed sudden onset severe headache. Hyperventilating with increased work of breathing. Wheezing in all lung fields to auscultation. Neuro intact on exam. Duoneb x1 ordered. Dilaudid 0.25 IV given for pain control. Subsequently patient developed nausea with multiple episodes of vomiting. Hypertensive with SBP in the 190s. Given hydralazine 10mg IVP, Zofran. Stroke code and rapid response called. STAT labs sent. CTH/CTA/CTP completed. Tachycardic to the 180s, consistent with SVT, remained hypertensive. Given 10 of labetalol, SVT broke. Transferred to Telemetry. WBC 23.43, lactate 8.5. Given 1L fluid bolus. Pan culture sent. Started empirically on Vanc/Meropenem.   : Put on eeg. Voiding while retaining urine, SC for 500cc. EEG +spikes, epilepsy consulted. CTA chest and CT A/P pending. Keppra increased 1g BID.   : Cont EEG, trend Na, possible NGT today if not able to tolerate PO. Blood cx growing GS + cocci in clusters, f/u MRSA swab and pan cx, cont meropenem and vancomycin. Vanc trough in am. F/u urine studies.   : Off EEG, neuro exam improved. ID following for concern for sepsis, likely aerobic blood cx bottle staph epi contaminant, cont monitoring off of abx, possible drug reaction as cause. F/u surveillance blood cx. Hyponatremia, repeat Na 129 overnight from 130, cont current regimen and f/u am labs and nephrology recs. AM na 129 continuing fluid restriction, nephro recommending restarting urena, but holding due to possible drug reaction previously.   : neurologically stable, c/w fluid restriction, f/u AM Na, urine osm, urine Na, cortisol. Given 15mg of tolvaptan. Fluid restriction, florinef d/c'd per nephro recs. Held salt tabs for today. 6pm BMP Na 122, 10pm BMP Na 131, urine osm 141  : ANA LILIA ovn, neuro stable. AM Na 133. Restarted salt tabs 3q6 and 1L fluid restriction. Lantus 10u at bedtime added. 9:30pm BMP per nephro Na 138  : ANA LILIA ovn, neuro stable. Na stable, discussed with nephrology can cont tolvaptan 15mg daily PRN for hyponatremia, only needed for hypoNa, can discontinue fluid restriction. Pt evaluated at bedside after nurse noted patient to have some expressive aphasia and perseverating; patient oriented to self, unable to say hospital or year, following all commands and DUMONT 5/5 strength, no drift, no facial droop, perseverating on pt's  when asked other questions. Pt afebrile, vitals stable, cont keppra 1g BID, cont decadron 4mg BID, discussed with Dr. Dorado, defer CTH at this time and monitor clinically. . Lantus increased to 14u at bedtime.  : o/n PSVT 13 beats followed by sinus tachycardia in setting of anxiety and net negative fluid balance.  Resolved to 102 with verbal comfort and further resolved with 500 cc NS. Lantus increased to 18u qhs. Dr. Costa consulted.  : ANA LILIA overnight. Neuro stable.   : ANA LILIA overnight. Neuro exam stable. Dispo pending.  : multiple BMs o/n. Na stable. Lexapro increased to 10mg daily for worsening depression. Held bowel regimen 2/2 multiple stools.   : ANA LILIA overnight, Na 133 f/u AM labs, pending [place]. Pt has episode of sustained tachycardia, c/o feeling warm, and dizzy while working with Pt. Given 500cc NS bolus, rectal temp was 99F. Family brought in medical marijuana. Pt had rectal temp 100.9, pancultured. Given another 1L bolus and started on maintenance fluids. Rectal temp 102. Started empirically on vanc/cipro/flagyl. Upgraded to telemetry.  : In AM patient had episode of emesis and was unresponsive to sternal rub. Dr. Huggins, neurointensivist at bedside. Repeat BP 60s/40s, tachycardic to 110s. Rapid response called. Levo gtt started. Upgraded to ICU. Intubated for airway protection. Likely septic shock given GNR in blood cx growing K.pneumoniae, Given additional 1L bolus x2. Abx changed to vanc and meropenem, ID following, rec to d/c vanc, c/w meropenem [id number], FOBT positive, keppra changed to vimpat 50BID, fentanyl 25mg q4hrs prn for vent dysynchrony, home lexapro decreased to 5mg, decadron decreased to 2q12, sucralfate increased to 1q6 iso GI bleed, pending repeat CBC, CMP, ABG, DIC labs, xray abdomen to r/o obstruction, propofol gtt started for sedation, weaning off precedex gtt, GI consulted rec protonix 40bid iso possible GI bleed, plan for possible EGD, albumin 1.7, 25% albumin 50ml given, s/p 1u PRBC and PLT repeat cbc hgb 8.0 from 6.6 and PLT 88 from 55. Protonix gtt started per GI recs.   : ANA LILIA overnight. Neuro stable. Hemoglobin 6.5, platelets 47 o/n, transfused 1uPRBC, 1u platelets in AM. Repeat Hb 7.1 and platelets 81. CBC repeated and Hb 7.2 and platelets 74. Arnie test negative. 1 set of surveillance blood cultures sent. NS increased to 120cc/hr. Holding off on CT C/A/P per nephrology recs due to concern about IV contrast. Temp 100.8 F, given tylenol. Additional 1u PRBC given in evening for Hb 6.8.     Vital Signs Last 24 Hrs  T(C): 37.3 (2023 05:13), Max: 38.2 (2023 08:53)  T(F): 99.1 (2023 05:13), Max: 100.8 (2023 16:00)  HR: 104 (2023 06:28) (90 - 113)  BP: 105/55 (2023 06:00) (95/54 - 120/57)  BP(mean): 74 (2023 06:) (70 - 86)  RR: 19 (:) (13 - 20)  SpO2: 100% (:28) (98% - 100%)    Parameters below as of 2023 06:28  Patient On (Oxygen Delivery Method): ventilator    O2 Concentration (%): 40    I&O's Detail    2023 07:01  -  2023 07:00  --------------------------------------------------------  IN:    Enteral Tube Flush: 200 mL    IV PiggyBack: 450 mL    Norepinephrine: 244.2 mL    Pantoprazole: 240 mL    Platelets - Single Donor: 454 mL    PRBCs (Packed Red Blood Cells): 258 mL    Propofol: 463.8 mL    sodium chloride 0.9%: 150 mL    sodium chloride 0.9%: 2640 mL  Total IN: 5100 mL    OUT:    Dexmedetomidine: 0 mL    Indwelling Catheter - Urethral (mL): 3515 mL    Nasogastric/Oral tube (mL): 1100 mL  Total OUT: 4615 mL    Total NET: 485 mL        I&O's Summary    2023 07:01  -  2023 07:00  --------------------------------------------------------  IN: 5100 mL / OUT: 4615 mL / NET: 485 mL      PHYSICAL EXAM:  General: Patient laying in bed, intubated, in no apparent distress. Sedation held for exam.   HEENT: 4mm pupils equal and reactive to light bilaterally.   Cardiovascular: Clear S1 and S2 without murmurs, gallops or rubs auscultated.  Respiratory: intubated, Clear to ausculation bilaterally without wheezing, rhonchi or rales.  GI: Soft, nontender, nondistended. Positive bowel sounds in all four quadrants.  Neuro: opened eyes, tracks, grimacing to noxious stimuli, not FC. PERRL. Upper extremities AG to noxious bilaterally,  b/l LE withdrawal to noxious, +cough/gag/corneals  Extremities: 2+ Dorsalis Pedis and Posterior Tibial pulses bilaterally.    TUBES/LINES:  [x] Gonzalez  [] Lumbar Drain  [] Wound Drains  [x] Others: a-line, R IJ CVC    DIET:  [X] NPO  [] Mechanical  [] Tube feeds    LABS:                        7.0    3.34  )-----------( 117      ( 2023 04:13 )             21.0     07-31    136  |  110<H>  |  15  ----------------------------<  231<H>  3.7   |  18<L>  |  0.56    Ca    7.2<L>      2023 04:13  Phos  2.9     07-  Mg     2.0     07-31    TPro  3.7<L>  /  Alb  1.8<L>  /  TBili  3.0<H>  /  DBili  1.5<H>  /  AST  31  /  ALT  22  /  AlkPhos  57  07-30    PT/INR - ( 2023 15:34 )   PT: 14.3 sec;   INR: 1.26          PTT - ( 2023 15:34 )  PTT:27.4 sec  Urinalysis Basic - ( 2023 04:13 )    Color: x / Appearance: x / SG: x / pH: x  Gluc: 231 mg/dL / Ketone: x  / Bili: x / Urobili: x   Blood: x / Protein: x / Nitrite: x   Leuk Esterase: x / RBC: x / WBC x   Sq Epi: x / Non Sq Epi: x / Bacteria: x      CARDIAC MARKERS ( 2023 06:28 )  x     / x     / 51 U/L / x     / 1.0 ng/mL      CAPILLARY BLOOD GLUCOSE      POCT Blood Glucose.: 230 mg/dL (2023 06:16)  POCT Blood Glucose.: 193 mg/dL (2023 22:17)  POCT Blood Glucose.: 194 mg/dL (2023 16:30)  POCT Blood Glucose.: 250 mg/dL (2023 12:30)      Drug Levels: [] N/A    CSF Analysis: [] N/A      Allergies    amoxicillin (Rash)    Intolerances      MEDICATIONS:  Antibiotics:  meropenem  IVPB 1000 milliGRAM(s) IV Intermittent every 8 hours    Neuro:  acetaminophen     Tablet .. 650 milliGRAM(s) Oral every 6 hours PRN  escitalopram 5 milliGRAM(s) Oral daily  fentaNYL    Injectable 25 MICROGram(s) IV Push every 2 hours PRN  lacosamide IVPB 50 milliGRAM(s) IV Intermittent every 12 hours  ondansetron Injectable 4 milliGRAM(s) IV Push every 6 hours PRN  propofol Infusion 20 MICROgram(s)/kG/Min IV Continuous <Continuous>    Anticoagulation:    OTHER:  atorvastatin 20 milliGRAM(s) Oral at bedtime  chlorhexidine 0.12% Liquid 15 milliLiter(s) Oral Mucosa every 12 hours  chlorhexidine 2% Cloths 1 Application(s) Topical <User Schedule>  dexAMETHasone  Injectable 2 milliGRAM(s) IV Push every 12 hours  fluticasone propionate 50 MICROgram(s)/spray Nasal Spray 1 Spray(s) Both Nostrils two times a day  insulin lispro (ADMELOG) corrective regimen sliding scale   SubCutaneous Before meals and at bedtime  marijuana, medical 2 Capsule(s) Oral every 6 hours PRN  norepinephrine Infusion 0.05 MICROgram(s)/kG/Min IV Continuous <Continuous>  pantoprazole Infusion 8 mG/Hr IV Continuous <Continuous>  psyllium Powder 1 Packet(s) Oral two times a day  sucralfate 1 Gram(s) Oral every 6 hours    IVF:  multivitamin 1 Tablet(s) Oral daily  sodium chloride 3 Gram(s) Oral every 6 hours  sodium chloride 0.9%. 1000 milliLiter(s) IV Continuous <Continuous>    CULTURES:  Culture Results:   No growth at 12 hours ( @ 12:52)  Culture Results:   Growth in anaerobic bottle: Klebsiella pneumoniae Susceptibility to  follow. ( @ 17:01)    RADIOLOGY & ADDITIONAL TESTS:      ASSESSMENT:  Pt is a 67YO male with a past medical history of type 2 diabetes, hyperlipidemia, iron deficiency anemia, tracheal stenosis s/p tracheostomy (which was closed by ENT 7 days ago); glioblastoma s/p resection 2022, and Avastin treatment 3/2023 presenting with expressive aphasia, nausea/vomiting, and severe hypoNa.  Hospital course complicated by septic shock and respiratory failure, requiring intubation.    PLAN:  Neuro:  -neuro q4h/vitals q1hrs   -CTH : Interval development of a 13 mm hyperdense nodule along the inferior margin of the resection cavity which may represent progression of disease with hemorrhage. Repeat CTH stable, CTH  stable   -CTA : negative, CTA  stable  -CTP  stable.   -pain control prn, fentanyl IVP prn  -vimpat 50BID switched from keppra  thrombocytopenia   -Decadron 2mg BID  -c/w home Lexapro 5mg (home dose 5mg)  -home ASA 81 held i/s/o thrombocytopenia   -MRI brain  suspicious for recurrent tumor and hydro   -palliative following   -propofol gtt for sedation    Pulm:  - VC/AC 14/400/40/5  - monitor respiratory alkalosis    Cardio:  - MAP Goal 65, on levo gtt  -  outpatient echo EF 55%  - pending repeat ECHO  - levo gtt    GI:  - OG tube to LIWS  - bowel regimen held for loose stools, LBM   - protonix gtt for GI bleed (+FOBT, despite brown stool)  - sucralfate 1q6 while on steroids  - GI following    Renal/:  - Hyponatremic (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 as well as SSRI use), on salt tabs 3q6, s/p tolvaptan 15mg   - NS @ 120cc/hr  - DC gonzalez, f/u TOV    Endo:  - ISS, Lantus 6u qhs   - A1c 5.7  - AM Cortisol to r/o Adrenal insufficiency   - h/o T2DM: home Januvia held  - h/o HLD: c/w Atorvastatin 20mg    Heme:  -SCDs for DVT ppx, SQL and Aspirin 81 held for thrombocytopenia   -heme consulted for thrombocytopenia- w/u sent, likely chronic thrombocytopenia d/t chemotherapy agent in past   -Per heme transfuse 1U plt if bleeding and platelet count <50k, if febrile and platelet count <20k  -1u PRBC, 1U PLT , 2u PRBC, 1u PLT , 1u PRBC   -LE dopplers  negative  -LUE doppler : L superficial cephalic vein thrombophlebitis  -CTA PE protocol for tachycardia: negative     ID:  - Pancultured , bcx growing K. pneumoniae, f/u blood cx  (no growth at 12 hours)  - F/u sputum culture   - flagyl and CTX per ID recs, s/p meropenem 1g q8hrs (-)  - Febrile, leukocytosis and tachycardia with elevated lactic acid on     Dispo: NSICU status, full code, AR    D/w Dr. Dorado      Assessment:  Present when checked    []  GCS  E   V  M     Heart Failure: []Acute, [] acute on chronic , []chronic  Heart Failure:  [] Diastolic (HFpEF), [] Systolic (HFrEF), []Combined (HFpEF and HFrEF), [] RHF, [] Pulm HTN, [] Other    [] EUGENIA, [] ATN, [] AIN, [] other  [] CKD1, [] CKD2, [] CKD 3, [] CKD 4, [] CKD 5, []ESRD    Encephalopathy: [] Metabolic, [] Hepatic, [] toxic, [] Neurological, [] Other    Abnormal Nurtitional Status: [] malnurtition (see nutrition note), [ ]underweight: BMI < 19, [] morbid obesity: BMI >40, [] Cachexia    [] Sepsis  [] hypovolemic shock,[] cardiogenic shock, [] hemorrhagic shock, [] neuogenic shock  [] Acute Respiratory Failure  []Cerebral edema, [] Brain compression/ herniation,   [] Functional quadriplegia  [] Acute blood loss anemia

## 2023-07-31 NOTE — PROGRESS NOTE ADULT - ASSESSMENT
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  He was medically managed.  Course c/b septic shock due to K.pneumo bacteremia, patient has +FOBT, Hgb drop from 9 to 6.7, and billous vomiting, suspect due to gut translocation in setting of GIB. Per team, patient going for CT C/A/P today to eval for source of bleed. Kleb pneumo sensitive to CTX on susceptibilities- can narrow therapy from meropenem.     Suggest:  -F/u Bcxs 7/30 and 7/31  -F/u sputum culture (pending collection)  -F/u CT C/A/P   -discontinue meropenem  -start CTX 1g IV q24h (coverage of Kleb pneumo)  -start Flagyl 500 mg IV Q8h (to add coverage of other anaerobes)     Team 2 will follow you.    Case d/w primary team.  Final recommendation pending attending note.    Kelsey Jolley, Infectious Diseases PA  Please reach out for any questions 9 am-5pm. For evenings and weekends, please call the ID physician on call.  Work cell: [phone]   Attending Attestation (For Attendings USE Only)...

## 2023-07-31 NOTE — PROGRESS NOTE ADULT - SUBJECTIVE AND OBJECTIVE BOX
=========================  NSICU ATTENDING PROGRESS NOTE  =========================    67 Y/O male with a past medical history of type 2 diabetes, hyperlipidemia, iron deficiency anemia, tracheal stenosis s/p tracheostomy, glioblastoma s/p resection 1/27/2022, and Avastin treatment 3/2023 presenting with altered mental status and weakness x2 days. Per family, LKN 6pm on 07/05 when he was at baseline. Patient has intermittent expressive aphasia but is getting worse and now is persistent, and has intermittent nausea and vomiting since 07/05. Patient was scheduled to have outpatient brain MRI on 7/25/2023.  Stroke code called in ED. Head CT shows new hyperdensity in frontal parietal - surgical site. MRI ordered to rule out tumor recurrence.   Per daughter at bedside, Entresto and Eliquis were stopped per his PC - cardiologist.  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  Type 2 diabetes mellitus  Hyperlipidemia  Iron deficiency anemia  Nephrolithiasis  Thrombocytopenia  Hyponatremia  BPH (benign prostatic hyperplasia)  S/P craniotomy  H/O tracheostomy      REVIEW OF SYSTEMS: [x] Unable to Assess due to neurologic exam   [ ] All ROS addressed below are non-contributory, except:      ICU Vital Signs Last 24 Hrs  T(C): 37.3 (31 Jul 2023 05:13), Max: 38.2 (30 Jul 2023 08:53)  T(F): 99.1 (31 Jul 2023 05:13), Max: 100.8 (30 Jul 2023 16:00)  HR: 109 (31 Jul 2023 08:00) (90 - 113)  BP: 109/60 (31 Jul 2023 08:00) (95/54 - 118/65)  BP(mean): 78 (31 Jul 2023 08:00) (70 - 86)  ABP: 135/50 (31 Jul 2023 08:00) (111/44 - 139/50)  ABP(mean): 77 (31 Jul 2023 08:00) (64 - 78)  RR: 16 (31 Jul 2023 08:00) (13 - 20)  SpO2: 100% (31 Jul 2023 08:00) (98% - 100%)      07-30-23 @ 07:01  -  07-31-23 @ 07:00  --------------------------------------------------------  IN: 5580 mL / OUT: 4735 mL / NET: 845 mL    07-31-23 @ 07:01  -  07-31-23 @ 08:32  --------------------------------------------------------  IN: 148.7 mL / OUT: 225 mL / NET: -76.3 mL      Mode: AC/ CMV (Assist Control/ Continuous Mandatory Ventilation), RR (machine): 14, TV (machine): 400, FiO2: 40, PEEP: 5, ITime: 1, MAP: 7.9, PIP: 13    PHYSICAL EXAM:  General: No Acute Distress, intubated   HEENT: ETT, OG tube  Neurological: grimace to pain, not following commands, eyes not open to nox, pupils 3mm reactive & midline b/l, WD all extremities to noxious stim, +cough/+gag,   Pulmonary:   Cardiovascular: S1, S2, Regular Rate and Rhythm   Gastrointestinal: Soft, Nontender, Nondistended   Extremities: No edema       MEDICATIONS:   acetaminophen     Tablet .. 650 milliGRAM(s) Oral every 6 hours PRN  atorvastatin 20 milliGRAM(s) Oral at bedtime  chlorhexidine 0.12% Liquid 15 milliLiter(s) Oral Mucosa every 12 hours  chlorhexidine 2% Cloths 1 Application(s) Topical <User Schedule>  dexAMETHasone  Injectable 2 milliGRAM(s) IV Push every 12 hours  escitalopram 5 milliGRAM(s) Oral daily  fentaNYL    Injectable 25 MICROGram(s) IV Push every 2 hours PRN  fluticasone propionate 50 MICROgram(s)/spray Nasal Spray 1 Spray(s) Both Nostrils two times a day  insulin lispro (ADMELOG) corrective regimen sliding scale   SubCutaneous Before meals and at bedtime  lacosamide IVPB 50 milliGRAM(s) IV Intermittent every 12 hours  marijuana, medical 2 Capsule(s) Oral every 6 hours PRN  meropenem  IVPB 1000 milliGRAM(s) IV Intermittent every 8 hours  multivitamin 1 Tablet(s) Oral daily  norepinephrine Infusion 0.05 MICROgram(s)/kG/Min (3.4 mL/Hr) IV Continuous <Continuous>  ondansetron Injectable 4 milliGRAM(s) IV Push every 6 hours PRN  pantoprazole Infusion 8 mG/Hr (10 mL/Hr) IV Continuous <Continuous>  propofol Infusion 20 MICROgram(s)/kG/Min (8.71 mL/Hr) IV Continuous <Continuous>  psyllium Powder 1 Packet(s) Oral two times a day  sodium chloride 3 Gram(s) Oral every 6 hours  sodium chloride 0.9%. 1000 milliLiter(s) (120 mL/Hr) IV Continuous <Continuous>  sucralfate 1 Gram(s) Oral every 6 hours    LABS:                     7.0    3.34  )-----------( 117      ( 31 Jul 2023 04:13 )             21.0     07-31    136  |  110<H>  |  15  ----------------------------<  231<H>  3.7   |  18<L>  |  0.56    Ca    7.2<L>      31 Jul 2023 04:13  Phos  2.9     07-31  Mg     2.0     07-31    TPro  3.7<L>  /  Alb  1.8<L>  /  TBili  3.0<H>  /  DBili  1.5<H>  /  AST  31  /  ALT  22  /  AlkPhos  57  07-30    LIVER FUNCTIONS - ( 30 Jul 2023 01:16 )  Alb: 1.8 g/dL / Pro: 3.7 g/dL / ALK PHOS: 57 U/L / ALT: 22 U/L / AST: 31 U/L / GGT: x           ABG - ( 30 Jul 2023 09:33 )  pH, Arterial: 7.44  pH, Blood: x     /  pCO2: 25    /  pO2: 190   / HCO3: 17    / Base Excess: -5.4  /  SaO2: 99.9        Culture - Blood (collected 07-30-23 @ 12:52)  Source: .Blood Blood  Preliminary Report (07-31-23 @ 02:00):    No growth at 12 hours                         =========================  NSICU ATTENDING PROGRESS NOTE  =========================    67 Y/O male with a past medical history of type 2 diabetes, hyperlipidemia, iron deficiency anemia, tracheal stenosis s/p tracheostomy, glioblastoma s/p resection 1/27/2022, and Avastin treatment 3/2023 presenting with altered mental status and weakness x2 days. Per family, LKN 6pm on 07/05 when he was at baseline. Patient has intermittent expressive aphasia but is getting worse and now is persistent, and has intermittent nausea and vomiting since 07/05. Patient was scheduled to have outpatient brain MRI on 7/25/2023.  Stroke code called in ED. Head CT shows new hyperdensity in frontal parietal - surgical site. MRI ordered to rule out tumor recurrence.   Per daughter at bedside, Entresto and Eliquis were stopped per his PC - cardiologist.  (07 Jul 2023 13:10)    HOSP COURSE:   7/27: multiple BMs o/n. Na stable. Lexapro increased to 10mg daily for worsening depression. Held bowel regimen 2/2 multiple stools.   7/28: ANA LILIA overnight. Na 133 f/u AM labs, pending [place]. Pt has episode of sustained tachycardia, c/o feeling warm, and dizzy while working with Pt. Given 500cc NS bolus, rectal temp was 99F. Family brought in medical marijuana. Pt had rectal temp 100.9, pancultured. Given another 1L bolus and started on maintenance fluids. Rectal temp 102. Started empirically on vanc/cipro/flagyl. Upgraded to telemetry.  7/29: In AM patient had episode of emesis and was unresponsive to sternal rub. Dr. Huggins, neurointensivist at bedside. Repeat BP 60s/40s, tachycardic to 110s. Rapid response called. Levo gtt started. Upgraded to ICU. Intubated for airway protection. Likely septic shock given GNR in blood cx growing K.pneumoniae, Given additional 1L bolus x2. Abx changed to vanc and meropenem, ID following, rec to d/c vanc, c/w meropenem [id number], FOBT positive, keppra changed to vimpat 50BID, fentanyl 25mg q4hrs prn for vent dysynchrony, home lexapro decreased to 5mg, decadron decreased to 2q12, sucralfate increased to 1q6 iso GI bleed, pending repeat CBC, CMP, ABG, DIC labs, xray abdomen to r/o obstruction, propofol gtt started for sedation, weaning off precedex gtt, GI consulted rec protonix 40bid iso possible GI bleed, plan for possible EGD, albumin 1.7, 25% albumin 50ml given, s/p 1u PRBC and PLT repeat cbc hgb 8.0 from 6.6 and PLT 88 from 55. Protonix gtt started per GI recs.   7/30: ANA LILIA overnight. Neuro stable. Hemoglobin 6.5, platelets 47 o/n, transfused 1uPRBC, 1u platelets in AM. Repeat Hb 7.1 and platelets 81. CBC repeated and Hb 7.2 and platelets 74. Arnie test negative. 1 set of surveillance blood cultures sent. NS increased to 120cc/hr. Holding off on CT C/A/P per nephrology recs due to concern about IV contrast. Temp 100.8 F, given tylenol. Additional 1u PRBC given in evening for Hb 6.8.     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  Type 2 diabetes mellitus  Hyperlipidemia  Iron deficiency anemia  Nephrolithiasis  Thrombocytopenia  Hyponatremia  BPH (benign prostatic hyperplasia)  S/P craniotomy  H/O tracheostomy      REVIEW OF SYSTEMS: [x] Unable to Assess due to neurologic exam   [ ] All ROS addressed below are non-contributory, except:      ICU Vital Signs Last 24 Hrs  T(C): 37.3 (31 Jul 2023 05:13), Max: 38.2 (30 Jul 2023 08:53)  T(F): 99.1 (31 Jul 2023 05:13), Max: 100.8 (30 Jul 2023 16:00)  HR: 109 (31 Jul 2023 08:00) (90 - 113)  BP: 109/60 (31 Jul 2023 08:00) (95/54 - 118/65)  BP(mean): 78 (31 Jul 2023 08:00) (70 - 86)  ABP: 135/50 (31 Jul 2023 08:00) (111/44 - 139/50)  ABP(mean): 77 (31 Jul 2023 08:00) (64 - 78)  RR: 16 (31 Jul 2023 08:00) (13 - 20)  SpO2: 100% (31 Jul 2023 08:00) (98% - 100%)      07-30-23 @ 07:01  -  07-31-23 @ 07:00  --------------------------------------------------------  IN: 5580 mL / OUT: 4735 mL / NET: 845 mL    07-31-23 @ 07:01  -  07-31-23 @ 08:32  --------------------------------------------------------  IN: 148.7 mL / OUT: 225 mL / NET: -76.3 mL      Mode: AC/ CMV (Assist Control/ Continuous Mandatory Ventilation), RR (machine): 14, TV (machine): 400, FiO2: 40, PEEP: 5, ITime: 1, MAP: 7.9, PIP: 13    PHYSICAL EXAM:  General: No Acute Distress, intubated   HEENT: ETT, OG tube  Neurological: Grimaces to pain, not following commands, eyes opening to noxious stim, tracks, pupils 3mm reactive & midline b/l, WD all extremities to noxious stim, +cough/+gag,   Pulmonary: Diminished at bases  Cardiovascular: S1, S2, Regular Rate and Rhythm   Gastrointestinal: Soft, Nontender, Nondistended   Extremities: No edema       MEDICATIONS:   acetaminophen     Tablet .. 650 milliGRAM(s) Oral every 6 hours PRN  atorvastatin 20 milliGRAM(s) Oral at bedtime  chlorhexidine 0.12% Liquid 15 milliLiter(s) Oral Mucosa every 12 hours  chlorhexidine 2% Cloths 1 Application(s) Topical <User Schedule>  dexAMETHasone  Injectable 2 milliGRAM(s) IV Push every 12 hours  escitalopram 5 milliGRAM(s) Oral daily  fentaNYL    Injectable 25 MICROGram(s) IV Push every 2 hours PRN  fluticasone propionate 50 MICROgram(s)/spray Nasal Spray 1 Spray(s) Both Nostrils two times a day  insulin lispro (ADMELOG) corrective regimen sliding scale   SubCutaneous Before meals and at bedtime  lacosamide IVPB 50 milliGRAM(s) IV Intermittent every 12 hours  marijuana, medical 2 Capsule(s) Oral every 6 hours PRN  meropenem  IVPB 1000 milliGRAM(s) IV Intermittent every 8 hours  multivitamin 1 Tablet(s) Oral daily  norepinephrine Infusion 0.05 MICROgram(s)/kG/Min (3.4 mL/Hr) IV Continuous <Continuous>  ondansetron Injectable 4 milliGRAM(s) IV Push every 6 hours PRN  pantoprazole Infusion 8 mG/Hr (10 mL/Hr) IV Continuous <Continuous>  propofol Infusion 20 MICROgram(s)/kG/Min (8.71 mL/Hr) IV Continuous <Continuous>  psyllium Powder 1 Packet(s) Oral two times a day  sodium chloride 3 Gram(s) Oral every 6 hours  sodium chloride 0.9%. 1000 milliLiter(s) (120 mL/Hr) IV Continuous <Continuous>  sucralfate 1 Gram(s) Oral every 6 hours    LABS:                     7.0    3.34  )-----------( 117      ( 31 Jul 2023 04:13 )             21.0     07-31    136  |  110<H>  |  15  ----------------------------<  231<H>  3.7   |  18<L>  |  0.56    Ca    7.2<L>      31 Jul 2023 04:13  Phos  2.9     07-31  Mg     2.0     07-31    TPro  3.7<L>  /  Alb  1.8<L>  /  TBili  3.0<H>  /  DBili  1.5<H>  /  AST  31  /  ALT  22  /  AlkPhos  57  07-30    LIVER FUNCTIONS - ( 30 Jul 2023 01:16 )  Alb: 1.8 g/dL / Pro: 3.7 g/dL / ALK PHOS: 57 U/L / ALT: 22 U/L / AST: 31 U/L / GGT: x           ABG - ( 30 Jul 2023 09:33 )  pH, Arterial: 7.44  pH, Blood: x     /  pCO2: 25    /  pO2: 190   / HCO3: 17    / Base Excess: -5.4  /  SaO2: 99.9        Culture - Blood (collected 07-30-23 @ 12:52)  Source: .Blood Blood  Preliminary Report (07-31-23 @ 02:00):    No growth at 12 hours

## 2023-07-31 NOTE — PROGRESS NOTE ADULT - SUBJECTIVE AND OBJECTIVE BOX
INTERVAL HISTORY: HPI:  Osiel Norwood is a 69YO male with a past medical history of type 2 diabetes, hyperlipidemia, iron deficiency anemia, tracheal stenosis s/p tracheostomy (which was closed by ENT 7 days ago); glioblastoma s/p resection 1/27/2022, and Avastin treatment 3/2023 presenting with altered mental status and weakness x2 days. Per family, they last saw him last night around 6pm when he was at baseline. Patient has intermittent expressive aphasia but is getting worse and now is persistent, and has intermittent nausea and vomiting since last night. Patient was supposed to receive an outpatient brain MRI on 7/25/2023.  Stroke code performed in  is negative for CVA. Head CT shows new hyperdensity in frontal parietal - surgical site. MRI is ordered to rule out tumor recurrence.   Per daughter at bedside, Entresto and Eliquis were stopped per his PC - cardiologist.      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  Type 2 diabetes mellitus  Hyperlipidemia  Iron deficiency anemia  Nephrolithiasis  Thrombocytopenia  Hyponatremia  BPH (benign prostatic hyperplasia)  S/P craniotomy  H/O tracheostomy      REVIEW OF SYSTEMS: [x] Unable to Assess due to neurologic exam   [ ] All ROS addressed below are non-contributory, except:  Neuro: [ ] Headache [ ] Back pain [ ] Numbness [ ] Weakness [ ] Ataxia [ ] Dizziness [ ] Aphasia [ ] Dysarthria [ ] Visual disturbance  Resp: [ ] Shortness of breath/dyspnea, [ ] Orthopnea [ ] Cough  CV: [ ] Chest pain [ ] Palpitation [ ] Lightheadedness [ ] Syncope  Renal: [ ] Thirst [ ] Edema  GI: [ ] Nausea [ ] Emesis [ ] Abdominal pain [ ] Constipation [ ] Diarrhea  Hem: [ ] Hematemesis [ ] bright red blood per rectum  ID: [ ] Fever [ ] Chills [ ] Dysuria  ENT: [ ] Rhinorrhea    PHYSICAL EXAM:    General: No Acute Distress, intubated   Neurological: grimace to pain, not following commands, eyes not open to nox, pupils 3mm reactive & midline b/l, WD all extremities to nos, +cough/+gag,   Pulmonary: Clear to Auscultation, No Rales, No Rhonchi, No Wheezes   Cardiovascular: S1, S2, Regular Rate and Rhythm   Gastrointestinal: Soft, Nontender, Nondistended   Extremities: No edema

## 2023-07-31 NOTE — PROGRESS NOTE ADULT - SUBJECTIVE AND OBJECTIVE BOX
GASTROENTEROLOGY PROGRESS NOTE    INTERVAL/SUBJECTIVE:  - Received 3U pRBC and 3U PLT from 7/30 AM till 7/31 AM with Hgb 6.5 / PLT 47 to Hgb 8.2 / PLT 88  - Afebrile and no bleeding noted overnight  - Weaned off pressors during the day and abx de-escalated to CTX/Flagyl    Unable to obtain history 2/2 intubation and sedation.    Allergies    amoxicillin (Rash)  ure-Na (Rash; Fever; Hypotension)    Intolerances    MEDICATIONS:  MEDICATIONS  (STANDING):  atorvastatin 20 milliGRAM(s) Oral at bedtime  cefTRIAXone   IVPB 1000 milliGRAM(s) IV Intermittent every 24 hours  chlorhexidine 0.12% Liquid 15 milliLiter(s) Oral Mucosa every 12 hours  chlorhexidine 2% Cloths 1 Application(s) Topical <User Schedule>  dexAMETHasone  Injectable 2 milliGRAM(s) IV Push every 12 hours  escitalopram 5 milliGRAM(s) Oral daily  fluticasone propionate 50 MICROgram(s)/spray Nasal Spray 1 Spray(s) Both Nostrils two times a day  insulin glargine Injectable (LANTUS) 6 Unit(s) SubCutaneous at bedtime  insulin lispro (ADMELOG) corrective regimen sliding scale   SubCutaneous every 6 hours  lacosamide IVPB 50 milliGRAM(s) IV Intermittent every 12 hours  metroNIDAZOLE  IVPB      metroNIDAZOLE  IVPB 500 milliGRAM(s) IV Intermittent every 8 hours  multivitamin 1 Tablet(s) Oral daily  norepinephrine Infusion 0.05 MICROgram(s)/kG/Min (3.4 mL/Hr) IV Continuous <Continuous>  pantoprazole Infusion 8 mG/Hr (10 mL/Hr) IV Continuous <Continuous>  propofol Infusion 20 MICROgram(s)/kG/Min (8.71 mL/Hr) IV Continuous <Continuous>  psyllium Powder 1 Packet(s) Oral two times a day  sodium chloride 3 Gram(s) Oral every 6 hours  sodium chloride 0.9%. 1000 milliLiter(s) (120 mL/Hr) IV Continuous <Continuous>  sucralfate 1 Gram(s) Oral every 6 hours    MEDICATIONS  (PRN):  acetaminophen     Tablet .. 650 milliGRAM(s) Oral every 6 hours PRN Temp greater or equal to 38C (100.4F), Mild Pain (1 - 3)  fentaNYL    Injectable 25 MICROGram(s) IV Push every 2 hours PRN vent dysynchrony  ondansetron Injectable 4 milliGRAM(s) IV Push every 6 hours PRN Nausea and/or Vomiting    Vital Signs Last 24 Hrs  T(C): 37 (31 Jul 2023 14:59), Max: 38.2 (30 Jul 2023 16:00)  T(F): 98.6 (31 Jul 2023 14:59), Max: 100.8 (30 Jul 2023 16:00)  HR: 111 (31 Jul 2023 15:00) (90 - 117)  BP: 110/59 (31 Jul 2023 15:00) (95/54 - 118/62)  BP(mean): 78 (31 Jul 2023 15:00) (70 - 85)  RR: 16 (31 Jul 2023 15:00) (11 - 20)  SpO2: 99% (31 Jul 2023 15:00) (99% - 100%)    Parameters below as of 31 Jul 2023 15:00  Patient On (Oxygen Delivery Method): ventilator    07-30 @ 07:01  -  07-31 @ 07:00  --------------------------------------------------------  IN: 5580 mL / OUT: 4735 mL / NET: 845 mL    07-31 @ 07:01  -  07-31 @ 15:20  --------------------------------------------------------  IN: 880.1 mL / OUT: 975 mL / NET: -94.9 mL      PHYSICAL EXAM:  General: Intubated, sedated  Lungs: Normal respiratory effort  Abdomen: Soft, non-tender non-distended; No rebound or guarding  : Dark yellow urine in [place]  Extremities: Warm, no edema  Neurological: Deferred    LABS:                       8.2    2.65  )-----------( 88       ( 31 Jul 2023 09:21 )             24.1     07-31    136  |  110<H>  |  15  ----------------------------<  231<H>  3.7   |  18<L>  |  0.56    Ca    7.2<L>      31 Jul 2023 04:13  Phos  2.9     07-31  Mg     2.0     07-31    TPro  3.7<L>  /  Alb  1.8<L>  /  TBili  3.0<H>  /  DBili  1.5<H>  /  AST  31  /  ALT  22  /  AlkPhos  57  07-30    PT/INR - ( 29 Jul 2023 15:34 )   PT: 14.3 sec;   INR: 1.26     PTT - ( 29 Jul 2023 15:34 )  PTT:27.4 sec    Culture - Blood (collected 30 Jul 2023 12:52)  Source: .Blood Blood  Preliminary Report (31 Jul 2023 14:00):    No growth at 1 day.    Urinalysis with Rflx Culture (collected 29 Jul 2023 20:43)    Culture - Blood (collected 28 Jul 2023 17:01)  Source: .Blood Blood-Peripheral  Preliminary Report (30 Jul 2023 18:01):    No growth at 2 days.    Culture - Blood (collected 28 Jul 2023 17:01)  Source: .Blood Blood-Venous  Gram Stain (29 Jul 2023 06:23):    Growth in anaerobic bottle: Gram Negative Rods    Result called to and read back byBAO WEIR RN  07/29/2023    06:23:39    ***Blood Panel PCR results on this specimen are available    approximately 3 hours after the Gram stain result.***    Gram stain, PCR, and/or culture results may not always    correspond due to difference in methodologies.    ************************************************************    This PCR assay was performed using [employer] BCID2 panel.    This assay tests for bacterial, fungal and resistance gene    targets.  Preliminary Report (31 Jul 2023 09:29):    Growth in anaerobic bottle: Klebsiella pneumoniae  Organism: Klebsiella pneumoniae  Blood Culture PCR (31 Jul 2023 09:28)  Organism: Klebsiella pneumoniae (31 Jul 2023 09:28)  Organism: Blood Culture PCR (29 Jul 2023 07:11)    Urinalysis with Rflx Culture (collected 28 Jul 2023 16:16)      RADIOLOGY & ADDITIONAL STUDIES:  Reviewed

## 2023-07-31 NOTE — PROGRESS NOTE ADULT - SUBJECTIVE AND OBJECTIVE BOX
INFECTIOUS DISEASES CONSULT FOLLOW-UP NOTE    INTERVAL HPI/OVERNIGHT EVENTS:    Patient seen and examined at bedside. Intubated and sedated. Febrile to 100.4 last night. Unable to obtain ROS.       ROS:   Constitutional, eyes, ENT, cardiovascular, respiratory, gastrointestinal, genitourinary, integumentary, neurological, psychiatric and heme/lymph are otherwise negative other than noted above       ANTIBIOTICS/RELEVANT:    MEDICATIONS  (STANDING):  atorvastatin 20 milliGRAM(s) Oral at bedtime  cefTRIAXone   IVPB 1000 milliGRAM(s) IV Intermittent every 24 hours  chlorhexidine 0.12% Liquid 15 milliLiter(s) Oral Mucosa every 12 hours  chlorhexidine 2% Cloths 1 Application(s) Topical <User Schedule>  dexAMETHasone  Injectable 2 milliGRAM(s) IV Push every 12 hours  escitalopram 5 milliGRAM(s) Oral daily  fluticasone propionate 50 MICROgram(s)/spray Nasal Spray 1 Spray(s) Both Nostrils two times a day  insulin glargine Injectable (LANTUS) 6 Unit(s) SubCutaneous at bedtime  insulin lispro (ADMELOG) corrective regimen sliding scale   SubCutaneous every 6 hours  lacosamide IVPB 50 milliGRAM(s) IV Intermittent every 12 hours  metroNIDAZOLE  IVPB      metroNIDAZOLE  IVPB 500 milliGRAM(s) IV Intermittent every 8 hours  multivitamin 1 Tablet(s) Oral daily  norepinephrine Infusion 0.05 MICROgram(s)/kG/Min (3.4 mL/Hr) IV Continuous <Continuous>  pantoprazole Infusion 8 mG/Hr (10 mL/Hr) IV Continuous <Continuous>  propofol Infusion 20 MICROgram(s)/kG/Min (8.71 mL/Hr) IV Continuous <Continuous>  psyllium Powder 1 Packet(s) Oral two times a day  sodium chloride 3 Gram(s) Oral every 6 hours  sodium chloride 0.9%. 1000 milliLiter(s) (120 mL/Hr) IV Continuous <Continuous>  sucralfate 1 Gram(s) Oral every 6 hours    MEDICATIONS  (PRN):  acetaminophen     Tablet .. 650 milliGRAM(s) Oral every 6 hours PRN Temp greater or equal to 38C (100.4F), Mild Pain (1 - 3)  fentaNYL    Injectable 25 MICROGram(s) IV Push every 2 hours PRN vent dysynchrony  ondansetron Injectable 4 milliGRAM(s) IV Push every 6 hours PRN Nausea and/or Vomiting        Vital Signs Last 24 Hrs  T(C): 37.6 (31 Jul 2023 12:00), Max: 38.2 (30 Jul 2023 16:00)  T(F): 99.6 (31 Jul 2023 12:00), Max: 100.8 (30 Jul 2023 16:00)  HR: 117 (31 Jul 2023 13:00) (90 - 117)  BP: 110/58 (31 Jul 2023 13:00) (95/54 - 118/62)  BP(mean): 78 (31 Jul 2023 13:00) (70 - 85)  RR: 16 (31 Jul 2023 13:00) (11 - 20)  SpO2: 100% (31 Jul 2023 13:00) (99% - 100%)    Parameters below as of 31 Jul 2023 13:00  Patient On (Oxygen Delivery Method): ventilator    O2 Concentration (%): 40    07-30-23 @ 07:01  -  07-31-23 @ 07:00  --------------------------------------------------------  IN: 5580 mL / OUT: 4735 mL / NET: 845 mL    07-31-23 @ 07:01  -  07-31-23 @ 14:48  --------------------------------------------------------  IN: 880.1 mL / OUT: 975 mL / NET: -94.9 mL      PHYSICAL EXAM:  Constitutional: alert, NAD  Eyes: the sclera and conjunctiva were normal.   ENT: the ears and nose were normal in appearance. Intubated   Neck: the appearance of the neck was normal and the neck was supple.   Pulmonary: no respiratory distress and lungs were clear to auscultation bilaterally.   Heart: heart rate was normal and rhythm regular, normal S1 and S2  Vascular:. there was no peripheral edema  Abdomen: normal bowel sounds, soft, non-tender  Neurological: no focal deficits.   Psychiatric: the affect was normal        LABS:                        8.2    2.65  )-----------( 88       ( 31 Jul 2023 09:21 )             24.1     07-31    136  |  110<H>  |  15  ----------------------------<  231<H>  3.7   |  18<L>  |  0.56    Ca    7.2<L>      31 Jul 2023 04:13  Phos  2.9     07-31  Mg     2.0     07-31    TPro  3.7<L>  /  Alb  1.8<L>  /  TBili  3.0<H>  /  DBili  1.5<H>  /  AST  31  /  ALT  22  /  AlkPhos  57  07-30    PT/INR - ( 29 Jul 2023 15:34 )   PT: 14.3 sec;   INR: 1.26          PTT - ( 29 Jul 2023 15:34 )  PTT:27.4 sec  Urinalysis Basic - ( 31 Jul 2023 04:13 )    Color: x / Appearance: x / SG: x / pH: x  Gluc: 231 mg/dL / Ketone: x  / Bili: x / Urobili: x   Blood: x / Protein: x / Nitrite: x   Leuk Esterase: x / RBC: x / WBC x   Sq Epi: x / Non Sq Epi: x / Bacteria: x        MICROBIOLOGY:  reviewed     RADIOLOGY & ADDITIONAL STUDIES:  Reviewed

## 2023-08-01 NOTE — PROGRESS NOTE ADULT - SUBJECTIVE AND OBJECTIVE BOX
SUBJECTIVE/OBJECTIVE:    ALLERGIES: amoxicillin (Rash)  ure-Na (Rash; Fever; Hypotension)      MEDICATIONS: REVIEWED  MEDICATIONS  (STANDING):  atorvastatin 20 milliGRAM(s) Oral at bedtime  cefTRIAXone   IVPB 1000 milliGRAM(s) IV Intermittent every 24 hours  chlorhexidine 0.12% Liquid 15 milliLiter(s) Oral Mucosa every 12 hours  chlorhexidine 2% Cloths 1 Application(s) Topical <User Schedule>  dexAMETHasone  Injectable 2 milliGRAM(s) IV Push every 12 hours  escitalopram 5 milliGRAM(s) Oral daily  fluticasone propionate 50 MICROgram(s)/spray Nasal Spray 1 Spray(s) Both Nostrils two times a day  insulin glargine Injectable (LANTUS) 14 Unit(s) SubCutaneous at bedtime  insulin lispro (ADMELOG) corrective regimen sliding scale   SubCutaneous every 6 hours  lacosamide IVPB 50 milliGRAM(s) IV Intermittent every 12 hours  metroNIDAZOLE  IVPB      metroNIDAZOLE  IVPB 500 milliGRAM(s) IV Intermittent every 8 hours  multivitamin 1 Tablet(s) Oral daily  pantoprazole Infusion 8 mG/Hr (10 mL/Hr) IV Continuous <Continuous>  propofol Infusion 19.995 MICROgram(s)/kG/Min (8.71 mL/Hr) IV Continuous <Continuous>  psyllium Powder 1 Packet(s) Oral two times a day  sodium chloride 3 Gram(s) Oral every 6 hours  sodium chloride 0.9%. 1000 milliLiter(s) (20 mL/Hr) IV Continuous <Continuous>  sucralfate 1 Gram(s) Oral every 6 hours    MEDICATIONS  (PRN):  acetaminophen     Tablet .. 650 milliGRAM(s) Oral every 6 hours PRN Temp greater or equal to 38C (100.4F), Mild Pain (1 - 3)  fentaNYL    Injectable 25 MICROGram(s) IV Push every 2 hours PRN vent dysynchrony  ondansetron Injectable 4 milliGRAM(s) IV Push every 6 hours PRN Nausea and/or Vomiting      Analgesic Use (Scheduled and PRNs) for past 24 hours (6a-6a):          T(C): 37.7 (08-01-23 @ 09:28), Max: 38.2 (08-01-23 @ 00:34)  HR: 108 (08-01-23 @ 12:00) (103 - 119)  BP: 101/56 (08-01-23 @ 12:00) (91/50 - 110/59)  RR: 11 (08-01-23 @ 12:00) (11 - 20)  SpO2: 100% (08-01-23 @ 12:00) (97% - 100%)  Wt(kg): --    CRITICAL CARE:  [ ]Shock Present  [ ]Septic [ ]Cardiogenic [ ]Neurologic [ ]Hypovolemic    [ ]Vasopressors [ ]Inotropes    [ ]Respiratory failure present   [ ]Mechanical Ventilation   [  ]Non-invasive ventilatory support   [ ]High-Flow  [ ]Acute  [ ]Chronic   [ ]Hypoxic  [ ]Hypercarbic   [ ]Other  [ ]Other organ failure     LABS: REVIEWED  CBC:                        8.1    3.25  )-----------( 73       ( 01 Aug 2023 05:30 )             23.4     CMP:    08-01    139  |  106  |  16  ----------------------------<  217<H>  3.2<L>   |  23  |  0.67    Ca    7.2<L>      01 Aug 2023 05:30  Phos  2.6     08-01  Mg     1.7     08-01    TPro  4.2<L>  /  Alb  2.2<L>  /  TBili  1.0  /  DBili  x   /  AST  21  /  ALT  15  /  AlkPhos  53  08-01      RADIOLOGY & ADDITIONAL STUDIES:     DISCUSSION OF CASE:    CARE COORDINATION:      SUBJECTIVE/OBJECTIVE: Interval events noted. over the weekend pt stepped up to ICU for mgmt of septic shock 2/2 bacteremia, hospital course further cb suspected GIB, pending EDG today. Pt seen and examined, notably more frail compared to previous visit earlier this admission. intubated and unable to provide ROS.  Comprehensive symptom assessment and GOC exploration as noted below. Extensive time spent discussing plan of care with family/son in law at bedside.     ALLERGIES: amoxicillin (Rash)  ure-Na (Rash; Fever; Hypotension)    MEDICATIONS: REVIEWED  MEDICATIONS  (STANDING):  atorvastatin 20 milliGRAM(s) Oral at bedtime  cefTRIAXone   IVPB 1000 milliGRAM(s) IV Intermittent every 24 hours  chlorhexidine 0.12% Liquid 15 milliLiter(s) Oral Mucosa every 12 hours  chlorhexidine 2% Cloths 1 Application(s) Topical <User Schedule>  dexAMETHasone  Injectable 2 milliGRAM(s) IV Push every 12 hours  escitalopram 5 milliGRAM(s) Oral daily  fluticasone propionate 50 MICROgram(s)/spray Nasal Spray 1 Spray(s) Both Nostrils two times a day  insulin glargine Injectable (LANTUS) 14 Unit(s) SubCutaneous at bedtime  insulin lispro (ADMELOG) corrective regimen sliding scale   SubCutaneous every 6 hours  lacosamide IVPB 50 milliGRAM(s) IV Intermittent every 12 hours  metroNIDAZOLE  IVPB      metroNIDAZOLE  IVPB 500 milliGRAM(s) IV Intermittent every 8 hours  multivitamin 1 Tablet(s) Oral daily  pantoprazole Infusion 8 mG/Hr (10 mL/Hr) IV Continuous <Continuous>  propofol Infusion 19.995 MICROgram(s)/kG/Min (8.71 mL/Hr) IV Continuous <Continuous>  psyllium Powder 1 Packet(s) Oral two times a day  sodium chloride 3 Gram(s) Oral every 6 hours  sodium chloride 0.9%. 1000 milliLiter(s) (20 mL/Hr) IV Continuous <Continuous>  sucralfate 1 Gram(s) Oral every 6 hours    MEDICATIONS  (PRN):  acetaminophen     Tablet .. 650 milliGRAM(s) Oral every 6 hours PRN Temp greater or equal to 38C (100.4F), Mild Pain (1 - 3)  fentaNYL    Injectable 25 MICROGram(s) IV Push every 2 hours PRN vent dysynchrony  ondansetron Injectable 4 milliGRAM(s) IV Push every 6 hours PRN Nausea and/or Vomiting    PRESENT SYMPTOMS:   [ x]Unable to obtain due to poor mentation   Source if other than patient:  [ ]Family   [x ]Team     Pain [  ]    PAIN AD Score:   [email or web address] (press ctrl +  left click to view)    If [  ], pt denies symptom.   Dyspnea:         [  ]  Anxiety:           [ x ] intermittently restless   Fatigue:           [  x]  Loss of appetite:     [x  ]  Other Symptoms: + secretions     All other review of systems negative [ x ]    ECOG Performance:     4  Current Palliative Performance Scale/Karnofsky Score:    20%  Preadmit Karnofsky: 50  %      PEx:  General: frail appearing older man lying in bed intubated NAD  Behavioral: calm   HEENT: atraumatic,  + new bl temporal wasting, + dry mouth, +ET tube/trach  RESP: Reg rhythm, No tachypnea/labored breathing,  +secretions, diminished bilat bases  CV: RRR, S1S2,  No  tachycardia  GI: soft non distended non tender  incontinent  MUSK: funct quad  SKIN: No abnormal skin lesions, Poor skin turgor  NEURO:  opens eyes to noxious stimuli, tracks, does not follow commands  Oral intake ability: unable/only mouth care    T(C): 37.7 (08-01-23 @ 09:28), Max: 38.2 (08-01-23 @ 00:34)  HR: 108 (08-01-23 @ 12:00) (103 - 119)  BP: 101/56 (08-01-23 @ 12:00) (91/50 - 110/59)  RR: 11 (08-01-23 @ 12:00) (11 - 20)  SpO2: 100% (08-01-23 @ 12:00) (97% - 100%)  Wt(kg): --    CRITICAL CARE:  [ ]Shock Present  [ ]Septic [ ]Cardiogenic [ ]Neurologic [ ]Hypovolemic    [ ]Vasopressors [ ]Inotropes    [ ]Respiratory failure present   [x ]Mechanical Ventilation   [  ]Non-invasive ventilatory support   [ ]High-Flow  [ ]Acute  [ ]Chronic   [ ]Hypoxic  [ ]Hypercarbic   [ ]Other  [ ]Other organ failure     LABS: REVIEWED  CBC:                        8.1    3.25  )-----------( 73       ( 01 Aug 2023 05:30 )             23.4     CMP:    08-01    139  |  106  |  16  ----------------------------<  217<H>  3.2<L>   |  23  |  0.67    Ca    7.2<L>      01 Aug 2023 05:30  Phos  2.6     08-01  Mg     1.7     08-01    TPro  4.2<L>  /  Alb  2.2<L>  /  TBili  1.0  /  DBili  x   /  AST  21  /  ALT  15  /  AlkPhos  53  08-01    RADIOLOGY & ADDITIONAL STUDIES:   - reviewed     DISCUSSION OF CASE:  - discussion of goc and treatment preferences at bedside

## 2023-08-01 NOTE — CHART NOTE - NSCHARTNOTEFT_GEN_A_CORE
Tachycardic, gave 20 IV lasix. Hgb at the time stable. NS@20 for renal protection. lantus increased to 14u. Lactate normalized. Given 500cc bolus for soft pressures, restarted levo gtt. Endoscopy done with GI showed severe circumferential esophagitis, small ulcer to anterior stomach/ Consent 1/Introductory Paragraph: The rationale for Mohs was explained to the patient and consent was obtained. The risks, benefits and alternatives to therapy were discussed in detail. Specifically, the risks of infection, scarring, bleeding, prolonged wound healing, incomplete removal, allergy to anesthesia, nerve injury and recurrence were addressed. Prior to the procedure, the treatment site was clearly identified and confirmed by the patient. All components of Universal Protocol/PAUSE Rule completed.

## 2023-08-01 NOTE — PROGRESS NOTE ADULT - SUBJECTIVE AND OBJECTIVE BOX
HPI:   Osiel Norwood is a 67YO male with a past medical history of type 2 diabetes, hyperlipidemia, iron deficiency anemia, tracheal stenosis s/p tracheostomy (which was closed by ENT 7 days ago); glioblastoma s/p resection 2022, and Avastin treatment 3/2023 presenting with altered mental status and weakness x2 days. Per family, they last saw him last night around 6pm when he was at baseline. Patient has intermittent expressive aphasia but is getting worse and now is persistent, and has intermittent nausea and vomiting since last night. Patient was supposed to receive an outpatient brain MRI on 2023.  Stroke code performed in  is negative for CVA. Head CT shows new hyperdensity in frontal parietal - surgical site. MRI is ordered to rule out tumor recurrence.   Per daughter at bedside, Entresto and Eliquis were stopped per his PC - cardiologist.     S/Overnight events:    ANA LILIA overnight. Neuro stable.     Hospital Course:   : Admitted. Na 115 in ED with repeat 118, started 3% at 30cc/hr, and salt tabs 3q6, stability CTH in ED showing Interval development of a 13 mm hyperdense nodule along the   inferior margin of the resection cavity which may represent progression   of disease with hemorrhage, repeat CTH stable, urine studies ordered  : Neuro stable, 12AM BMP Na114 3% increased to 40cc/hr, urine studies, pancx to r/o infection since pt is immunocompromised. Changed pantoprazole to sucralfate for GI ppx d/t thrombocytopenia. LE dopplers negative. DC'd 3%. ENT consulted to assess stoma, recommend follow up upon discharge with ENT, Repeat sodium 122. Restarted 3% at 30.  : ANA LILIA o/n neuro stable. remains on 3%@30cc/hr. Started florinef, increased 3% to 50cc/hr. Increased 3% to 75cc/hr.    7/10: continued current 3% rate o/n. Na 127 --> 125, cont 3% @75cc/hr, f/u repeat Na this evening, likely stepdown tomorrow. Pend dispo home with home PT/OT when able. Heme consulted for thrombocytopenia  : ANA LILIA o/n. Remains on 3% @75cc/hr. Decreased 3% to 50cc/hr. Started 1.2L fluid restriction. Repeat Na corrected 128  : ANA LILIA overnight, remains on 3%, SDU from ICU  : ANA LILIA overnight, neuro stable, on 3%@50, Am sodium 137, decreased 3% to 25cc/hr, repeat Na 138, decreased to 15cc/hr.  : ANA LILIA overnight, neuro stable, remains on 3%@15cc/hr  7/15: ANA LILIA overnight. Neuro exam stable. Pt remains on 3% saline.Sodium 134 this AM remains on 3%@25. Per nephrology recs hypertonic Na d'ceed, Na tabs 2q6, URENA 15g BID, cont florinef/ fluid restriction.Per nephrology Na >130 ok when ready for d/c   : ANA LILIA overnight, hypertonics d/c now on urea powder/1L fluid restriction/florinef and salt tabs per renal, hydrocortisone cream ordered for rash on back, rec for Home PT  : ANA LILIA overnight, following Na, renal following, ENT saw for hoarse voice rec followup with CT surgery for possible dilation, pending Home PT. Patient developed sudden onset severe headache. Hyperventilating with increased work of breathing. Wheezing in all lung fields to auscultation. Neuro intact on exam. Duoneb x1 ordered. Dilaudid 0.25 IV given for pain control. Subsequently patient developed nausea with multiple episodes of vomiting. Hypertensive with SBP in the 190s. Given hydralazine 10mg IVP, Zofran. Stroke code and rapid response called. STAT labs sent. CTH/CTA/CTP completed. Tachycardic to the 180s, consistent with SVT, remained hypertensive. Given 10 of labetalol, SVT broke. Transferred to Telemetry. WBC 23.43, lactate 8.5. Given 1L fluid bolus. Pan culture sent. Started empirically on Vanc/Meropenem.   : Put on eeg. Voiding while retaining urine, SC for 500cc. EEG +spikes, epilepsy consulted. CTA chest and CT A/P pending. Keppra increased 1g BID.   : Cont EEG, trend Na, possible NGT today if not able to tolerate PO. Blood cx growing GS + cocci in clusters, f/u MRSA swab and pan cx, cont meropenem and vancomycin. Vanc trough in am. F/u urine studies.   : Off EEG, neuro exam improved. ID following for concern for sepsis, likely aerobic blood cx bottle staph epi contaminant, cont monitoring off of abx, possible drug reaction as cause. F/u surveillance blood cx. Hyponatremia, repeat Na 129 overnight from 130, cont current regimen and f/u am labs and nephrology recs. AM na 129 continuing fluid restriction, nephro recommending restarting urena, but holding due to possible drug reaction previously.   : neurologically stable, c/w fluid restriction, f/u AM Na, urine osm, urine Na, cortisol. Given 15mg of tolvaptan. Fluid restriction, florinef d/c'd per nephro recs. Held salt tabs for today. 6pm BMP Na 122, 10pm BMP Na 131, urine osm 141  : ANA LILIA ovn, neuro stable. AM Na 133. Restarted salt tabs 3q6 and 1L fluid restriction. Lantus 10u at bedtime added. 9:30pm BMP per nephro Na 138  : ANA LILIA ovn, neuro stable. Na stable, discussed with nephrology can cont tolvaptan 15mg daily PRN for hyponatremia, only needed for hypoNa, can discontinue fluid restriction. Pt evaluated at bedside after nurse noted patient to have some expressive aphasia and perseverating; patient oriented to self, unable to say hospital or year, following all commands and DUMONT 5/5 strength, no drift, no facial droop, perseverating on pt's  when asked other questions. Pt afebrile, vitals stable, cont keppra 1g BID, cont decadron 4mg BID, discussed with Dr. Dorado, defer CTH at this time and monitor clinically. . Lantus increased to 14u at bedtime.  : o/n PSVT 13 beats followed by sinus tachycardia in setting of anxiety and net negative fluid balance.  Resolved to 102 with verbal comfort and further resolved with 500 cc NS. Lantus increased to 18u qhs. Dr. Costa consulted.  : ANA LILIA overnight. Neuro stable.   : ANA LILIA overnight. Neuro exam stable. Dispo pending.  : multiple BMs o/n. Na stable. Lexapro increased to 10mg daily for worsening depression. Held bowel regimen 2/2 multiple stools.   : ANA LILIA overnight, Na 133 f/u AM labs, pending [place]. Pt has episode of sustained tachycardia, c/o feeling warm, and dizzy while working with Pt. Given 500cc NS bolus, rectal temp was 99F. Family brought in medical marijuana. Pt had rectal temp 100.9, pancultured. Given another 1L bolus and started on maintenance fluids. Rectal temp 102. Started empirically on vanc/cipro/flagyl. Upgraded to telemetry.  : In AM patient had episode of emesis and was unresponsive to sternal rub. Dr. Huggins, neurointensivist at bedside. Repeat BP 60s/40s, tachycardic to 110s. Rapid response called. Levo gtt started. Upgraded to ICU. Intubated for airway protection. Likely septic shock given GNR in blood cx growing K.pneumoniae, Given additional 1L bolus x2. Abx changed to vanc and meropenem, ID following, rec to d/c vanc, c/w meropenem [id number], FOBT positive, keppra changed to vimpat 50BID, fentanyl 25mg q4hrs prn for vent dysynchrony, home lexapro decreased to 5mg, decadron decreased to 2q12, sucralfate increased to 1q6 iso GI bleed, pending repeat CBC, CMP, ABG, DIC labs, xray abdomen to r/o obstruction, propofol gtt started for sedation, weaning off precedex gtt, GI consulted rec protonix 40bid iso possible GI bleed, plan for possible EGD, albumin 1.7, 25% albumin 50ml given, s/p 1u PRBC and PLT repeat cbc hgb 8.0 from 6.6 and PLT 88 from 55. Protonix gtt started per GI recs.   : ANA LILIA overnight. Neuro stable. Hemoglobin 6.5, platelets 47 o/n, transfused 1uPRBC, 1u platelets in AM. Repeat Hb 7.1 and platelets 81. CBC repeated and Hb 7.2 and platelets 74. Arnie test negative. 1 set of surveillance blood cultures sent. NS increased to 120cc/hr. Holding off on CT C/A/P per nephrology recs due to concern about IV contrast. Temp 100.8 F, given tylenol. Additional 1u PRBC given in evening for Hb 6.8.   : 1u prbc given in am. 6 units lantus started at bedtime. Per ID stopped meropenam and started ceftriaxone 1qd and flagyl 500 q8.  Plan for EGD tomorrow with GI.   : Tachycardic, gave 20 IV lasix and dc'd fluids in setting of congestion.  Hgb at the time stable       Vital Signs Last 24 Hrs  T(C): 37.9 (01 Aug 2023 05:41), Max: 38.2 (01 Aug 2023 00:34)  T(F): 100.2 (01 Aug 2023 05:41), Max: 100.8 (01 Aug 2023 00:34)  HR: 114 (01 Aug 2023 05:00) (106 - 119)  BP: 104/55 (01 Aug 2023 05:00) (91/50 - 113/57)  BP(mean): 73 (01 Aug 2023 05:00) (64 - 80)  RR: 19 (01 Aug 2023 05:00) (11 - 20)  SpO2: 98% (01 Aug 2023 05:00) (97% - 100%)    Parameters below as of 01 Aug 2023 05:00  Patient On (Oxygen Delivery Method): ventilator, CPAP    O2 Concentration (%): 40    I&O's Detail    2023 07:01  -  01 Aug 2023 07:00  --------------------------------------------------------  IN:    IV PiggyBack: 100 mL    Norepinephrine: 1.3 mL    Pantoprazole: 200 mL    Propofol: 13 mL    Propofol: 294.8 mL    sodium chloride 0.9%: 2160 mL  Total IN: 2769.1 mL    OUT:    Indwelling Catheter - Urethral (mL): 2735 mL    Intermittent Catheterization - Urethral (mL): 800 mL    Nasogastric/Oral tube (mL): 1300 mL  Total OUT: 4835 mL    Total NET: -2065.9 mL        I&O's Summary    2023 07:01  -  01 Aug 2023 07:00  --------------------------------------------------------  IN: 2769.1 mL / OUT: 4835 mL / NET: -2065.9 mL        PHYSICAL EXAM:  General: Patient laying in bed, intubated, in no apparent distress. Sedation held for exam.   HEENT: 4mm pupils equal and reactive to light bilaterally.   Cardiovascular: Clear S1 and S2 without murmurs, gallops or rubs auscultated.  Respiratory: intubated, Clear to ausculation bilaterally without wheezing, rhonchi or rales.  GI: Soft, nontender, nondistended. Positive bowel sounds in all four quadrants.  Neuro: opened eyes, tracks, grimacing to noxious stimuli, not FC. PERRL. Upper extremities AG to noxious bilaterally,  b/l LE withdrawal to noxious, +cough/gag/corneals  Extremities: 2+ Dorsalis Pedis and Posterior Tibial pulses bilaterally.    TUBES/LINES:  [x] Campuzano  [] Lumbar Drain  [] Wound Drains  [x] Others: a-line, R IJ CVC    DIET:  [X] NPO  [] Mechanical  [] Tube feeds  LABS:                        8.1    3.25  )-----------( 73       ( 01 Aug 2023 05:30 )             23.4     08-01    139  |  106  |  16  ----------------------------<  217<H>  3.2<L>   |  23  |  0.67    Ca    7.2<L>      01 Aug 2023 05:30  Phos  2.6     08-  Mg     1.7     08-    TPro  4.2<L>  /  Alb  2.2<L>  /  TBili  1.0  /  DBili  x   /  AST  21  /  ALT  15  /  AlkPhos  53  08-      Urinalysis Basic - ( 01 Aug 2023 05:30 )    Color: x / Appearance: x / SG: x / pH: x  Gluc: 217 mg/dL / Ketone: x  / Bili: x / Urobili: x   Blood: x / Protein: x / Nitrite: x   Leuk Esterase: x / RBC: x / WBC x   Sq Epi: x / Non Sq Epi: x / Bacteria: x      CARDIAC MARKERS ( 01 Aug 2023 05:30 )  x     / x     / 67 U/L / x     / x          CAPILLARY BLOOD GLUCOSE      POCT Blood Glucose.: 194 mg/dL (01 Aug 2023 06:11)  POCT Blood Glucose.: 265 mg/dL (2023 22:53)  POCT Blood Glucose.: 231 mg/dL (2023 17:56)  POCT Blood Glucose.: 249 mg/dL (2023 11:54)      Drug Levels: [] N/A    CSF Analysis: [] N/A      Allergies    amoxicillin (Rash)  ure-Na (Rash; Fever; Hypotension)    Intolerances      MEDICATIONS:  Antibiotics:  cefTRIAXone   IVPB 1000 milliGRAM(s) IV Intermittent every 24 hours  metroNIDAZOLE  IVPB      metroNIDAZOLE  IVPB 500 milliGRAM(s) IV Intermittent every 8 hours    Neuro:  acetaminophen     Tablet .. 650 milliGRAM(s) Oral every 6 hours PRN  escitalopram 5 milliGRAM(s) Oral daily  fentaNYL    Injectable 25 MICROGram(s) IV Push every 2 hours PRN  lacosamide IVPB 50 milliGRAM(s) IV Intermittent every 12 hours  ondansetron Injectable 4 milliGRAM(s) IV Push every 6 hours PRN  propofol Infusion 19.995 MICROgram(s)/kG/Min IV Continuous <Continuous>    Anticoagulation:    OTHER:  atorvastatin 20 milliGRAM(s) Oral at bedtime  chlorhexidine 0.12% Liquid 15 milliLiter(s) Oral Mucosa every 12 hours  chlorhexidine 2% Cloths 1 Application(s) Topical <User Schedule>  dexAMETHasone  Injectable 2 milliGRAM(s) IV Push every 12 hours  fluticasone propionate 50 MICROgram(s)/spray Nasal Spray 1 Spray(s) Both Nostrils two times a day  insulin glargine Injectable (LANTUS) 10 Unit(s) SubCutaneous at bedtime  insulin lispro (ADMELOG) corrective regimen sliding scale   SubCutaneous every 6 hours  pantoprazole Infusion 8 mG/Hr IV Continuous <Continuous>  psyllium Powder 1 Packet(s) Oral two times a day  sucralfate 1 Gram(s) Oral every 6 hours    IVF:  magnesium sulfate  IVPB 2 Gram(s) IV Intermittent once  multivitamin 1 Tablet(s) Oral daily  potassium chloride   Powder 40 milliEquivalent(s) Oral once  potassium phosphate IVPB 30 milliMole(s) IV Intermittent once  sodium chloride 3 Gram(s) Oral every 6 hours    CULTURES:  Culture Results:   No growth at 12 hours ( @ 09:02)  Culture Results:   No growth at 1 day. ( @ 12:52)    RADIOLOGY & ADDITIONAL TESTS:      ASSESSMENT:  67YO male PMH T2DM, HLD, JAGDISH, tracheal stenosis s/p tracheostomy (s/p closure); glioblastoma s/p resection 2022, and [place] Avastin treatment 3/2023 adm with aphasia, N/V and severe hypoNa (118), imaging showing disesase progression, Hospital course complicated by sepsis secondary to bacteremia, decompensated, intubated, started on antibiotics and upgrated to ICU on .     PLAN:  Neuro:  -neuro q4h/vitals q1hrs   -CTH : Interval development of a 13 mm hyperdense nodule along the inferior margin of the resection cavity which may represent progression of disease with hemorrhage. Repeat CTH stable, CTH  stable   -CTA : negative, CTA  stable  -CTP  stable.   -pain control prn, fentanyl IVP prn  -vimpat 50mg BID (switched from keppra  thrombocytopenia) for seizure tx  -Decadron 2mg BID  -c/w home Lexapro 5mg (home dose 5mg)  -home ASA 81 held i/s/o thrombocytopenia   -MRI brain suspicious for recurrent tumor and hydro   -palliative following   -propofol gtt for sedation    Pulm:  -VC/AC 14/400/40/5    Cardio:  - MAP goal >65, on levo gtt prn   -  outpatient echo EF 65%    GI:  - OG tube to LIWS  - bowel regimen held for loose stools, LBM   - protonix gtt started for possible GI bleed  - sucralfate 1q6 while on steroids  - GI following, plan for EGD     Renal/:  - Hyponatremic (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 as well as SSRI use), s/p tolvaptan ; currently on NaCl tabs 3g q6h  - Trend BMP  - Maintenance fluids @ 120cc/hr  - Campuzano replaced     Endo:  -A1c 5.7  -lantus 10u qhs, ISS  - AM Cortisol to r/o Adrenal insufficiency   -h/o T2DM: home Januvia held  -h/o HLD: c/w Atorvastatin 20mg    Heme:  -SCDs for DVT ppx, SQL and Aspirin 81 held for thrombocytopenia   -heme consulted for thrombocytopenia- w/u sent, likely chronic thrombocytopenia d/t chemotherapy agent in past   -Per heme transfuse 1U plt if bleeding and platelet count <50k, if febrile and platelet count <20k  -1u PRBC, 1U PLT , 2u PRBC, 1u PLT   -LE dopplers  negative  -LUE doppler : L superficial cephalic vein thrombophlebitis  -CTA PE protocol for tachycardia: negative     ID:  - Pancultured , bcx growing K. pneumoniae, f/u blood cx   - meropenem 1g q8hrs (-), changed to Ceftriaxone/Flagyl per ID recs (- )  -Febrile, leukocytosis and tachycardia with elevated lactic acid on   -vancomycin/meropenem empirically (-), d/c per ID and repeat blood cx , NGTD    Dispo: NSICU status, full code, AR    D/w Dr. Dorado      Assessment:  Present when checked    []  GCS  E   V  M     Heart Failure: []Acute, [] acute on chronic , []chronic  Heart Failure:  [] Diastolic (HFpEF), [] Systolic (HFrEF), []Combined (HFpEF and HFrEF), [] RHF, [] Pulm HTN, [] Other    [] EUGENIA, [] ATN, [] AIN, [] other  [] CKD1, [] CKD2, [] CKD 3, [] CKD 4, [] CKD 5, []ESRD    Encephalopathy: [] Metabolic, [] Hepatic, [] toxic, [] Neurological, [] Other    Abnormal Nurtitional Status: [] malnurtition (see nutrition note), [ ]underweight: BMI < 19, [] morbid obesity: BMI >40, [] Cachexia    [] Sepsis  [] hypovolemic shock,[] cardiogenic shock, [] hemorrhagic shock, [] neuogenic shock  [] Acute Respiratory Failure  []Cerebral edema, [] Brain compression/ herniation,   [] Functional quadriplegia  [] Acute blood loss anemia   HPI:   Osiel Norwood is a 67YO male with a past medical history of type 2 diabetes, hyperlipidemia, iron deficiency anemia, tracheal stenosis s/p tracheostomy (which was closed by ENT 7 days ago); glioblastoma s/p resection 2022, and Avastin treatment 3/2023 presenting with altered mental status and weakness x2 days. Per family, they last saw him last night around 6pm when he was at baseline. Patient has intermittent expressive aphasia but is getting worse and now is persistent, and has intermittent nausea and vomiting since last night. Patient was supposed to receive an outpatient brain MRI on 2023.  Stroke code performed in  is negative for CVA. Head CT shows new hyperdensity in frontal parietal - surgical site. MRI is ordered to rule out tumor recurrence.   Per daughter at bedside, Entresto and Eliquis were stopped per his PC - cardiologist.     S/Overnight events:    ANA LILIA overnight. Neuro stable.     Hospital Course:   : Admitted. Na 115 in ED with repeat 118, started 3% at 30cc/hr, and salt tabs 3q6, stability CTH in ED showing Interval development of a 13 mm hyperdense nodule along the   inferior margin of the resection cavity which may represent progression   of disease with hemorrhage, repeat CTH stable, urine studies ordered  : Neuro stable, 12AM BMP Na114 3% increased to 40cc/hr, urine studies, pancx to r/o infection since pt is immunocompromised. Changed pantoprazole to sucralfate for GI ppx d/t thrombocytopenia. LE dopplers negative. DC'd 3%. ENT consulted to assess stoma, recommend follow up upon discharge with ENT, Repeat sodium 122. Restarted 3% at 30.  : ANA LILIA o/n neuro stable. remains on 3%@30cc/hr. Started florinef, increased 3% to 50cc/hr. Increased 3% to 75cc/hr.    7/10: continued current 3% rate o/n. Na 127 --> 125, cont 3% @75cc/hr, f/u repeat Na this evening, likely stepdown tomorrow. Pend dispo home with home PT/OT when able. Heme consulted for thrombocytopenia  : ANA LILIA o/n. Remains on 3% @75cc/hr. Decreased 3% to 50cc/hr. Started 1.2L fluid restriction. Repeat Na corrected 128  : ANA LILIA overnight, remains on 3%, SDU from ICU  : ANA LILIA overnight, neuro stable, on 3%@50, Am sodium 137, decreased 3% to 25cc/hr, repeat Na 138, decreased to 15cc/hr.  : ANA LILIA overnight, neuro stable, remains on 3%@15cc/hr  7/15: ANA LILIA overnight. Neuro exam stable. Pt remains on 3% saline.Sodium 134 this AM remains on 3%@25. Per nephrology recs hypertonic Na d'ceed, Na tabs 2q6, URENA 15g BID, cont florinef/ fluid restriction.Per nephrology Na >130 ok when ready for d/c   : ANA LILIA overnight, hypertonics d/c now on urea powder/1L fluid restriction/florinef and salt tabs per renal, hydrocortisone cream ordered for rash on back, rec for Home PT  : ANA LILIA overnight, following Na, renal following, ENT saw for hoarse voice rec followup with CT surgery for possible dilation, pending Home PT. Patient developed sudden onset severe headache. Hyperventilating with increased work of breathing. Wheezing in all lung fields to auscultation. Neuro intact on exam. Duoneb x1 ordered. Dilaudid 0.25 IV given for pain control. Subsequently patient developed nausea with multiple episodes of vomiting. Hypertensive with SBP in the 190s. Given hydralazine 10mg IVP, Zofran. Stroke code and rapid response called. STAT labs sent. CTH/CTA/CTP completed. Tachycardic to the 180s, consistent with SVT, remained hypertensive. Given 10 of labetalol, SVT broke. Transferred to Telemetry. WBC 23.43, lactate 8.5. Given 1L fluid bolus. Pan culture sent. Started empirically on Vanc/Meropenem.   : Put on eeg. Voiding while retaining urine, SC for 500cc. EEG +spikes, epilepsy consulted. CTA chest and CT A/P pending. Keppra increased 1g BID.   : Cont EEG, trend Na, possible NGT today if not able to tolerate PO. Blood cx growing GS + cocci in clusters, f/u MRSA swab and pan cx, cont meropenem and vancomycin. Vanc trough in am. F/u urine studies.   : Off EEG, neuro exam improved. ID following for concern for sepsis, likely aerobic blood cx bottle staph epi contaminant, cont monitoring off of abx, possible drug reaction as cause. F/u surveillance blood cx. Hyponatremia, repeat Na 129 overnight from 130, cont current regimen and f/u am labs and nephrology recs. AM na 129 continuing fluid restriction, nephro recommending restarting urena, but holding due to possible drug reaction previously.   : neurologically stable, c/w fluid restriction, f/u AM Na, urine osm, urine Na, cortisol. Given 15mg of tolvaptan. Fluid restriction, florinef d/c'd per nephro recs. Held salt tabs for today. 6pm BMP Na 122, 10pm BMP Na 131, urine osm 141  : ANA LILIA ovn, neuro stable. AM Na 133. Restarted salt tabs 3q6 and 1L fluid restriction. Lantus 10u at bedtime added. 9:30pm BMP per nephro Na 138  : ANA LILIA ovn, neuro stable. Na stable, discussed with nephrology can cont tolvaptan 15mg daily PRN for hyponatremia, only needed for hypoNa, can discontinue fluid restriction. Pt evaluated at bedside after nurse noted patient to have some expressive aphasia and perseverating; patient oriented to self, unable to say hospital or year, following all commands and DUMONT 5/5 strength, no drift, no facial droop, perseverating on pt's  when asked other questions. Pt afebrile, vitals stable, cont keppra 1g BID, cont decadron 4mg BID, discussed with Dr. Dorado, defer CTH at this time and monitor clinically. . Lantus increased to 14u at bedtime.  : o/n PSVT 13 beats followed by sinus tachycardia in setting of anxiety and net negative fluid balance.  Resolved to 102 with verbal comfort and further resolved with 500 cc NS. Lantus increased to 18u qhs. Dr. Costa consulted.  : ANA LILIA overnight. Neuro stable.   : ANA LILIA overnight. Neuro exam stable. Dispo pending.  : multiple BMs o/n. Na stable. Lexapro increased to 10mg daily for worsening depression. Held bowel regimen 2/2 multiple stools.   : ANA LILIA overnight, Na 133 f/u AM labs, pending [place]. Pt has episode of sustained tachycardia, c/o feeling warm, and dizzy while working with Pt. Given 500cc NS bolus, rectal temp was 99F. Family brought in medical marijuana. Pt had rectal temp 100.9, pancultured. Given another 1L bolus and started on maintenance fluids. Rectal temp 102. Started empirically on vanc/cipro/flagyl. Upgraded to telemetry.  : In AM patient had episode of emesis and was unresponsive to sternal rub. Dr. Huggins, neurointensivist at bedside. Repeat BP 60s/40s, tachycardic to 110s. Rapid response called. Levo gtt started. Upgraded to ICU. Intubated for airway protection. Likely septic shock given GNR in blood cx growing K.pneumoniae, Given additional 1L bolus x2. Abx changed to vanc and meropenem, ID following, rec to d/c vanc, c/w meropenem [id number], FOBT positive, keppra changed to vimpat 50BID, fentanyl 25mg q4hrs prn for vent dysynchrony, home lexapro decreased to 5mg, decadron decreased to 2q12, sucralfate increased to 1q6 iso GI bleed, pending repeat CBC, CMP, ABG, DIC labs, xray abdomen to r/o obstruction, propofol gtt started for sedation, weaning off precedex gtt, GI consulted rec protonix 40bid iso possible GI bleed, plan for possible EGD, albumin 1.7, 25% albumin 50ml given, s/p 1u PRBC and PLT repeat cbc hgb 8.0 from 6.6 and PLT 88 from 55. Protonix gtt started per GI recs.   : ANA LILIA overnight. Neuro stable. Hemoglobin 6.5, platelets 47 o/n, transfused 1uPRBC, 1u platelets in AM. Repeat Hb 7.1 and platelets 81. CBC repeated and Hb 7.2 and platelets 74. Arnie test negative. 1 set of surveillance blood cultures sent. NS increased to 120cc/hr. Holding off on CT C/A/P per nephrology recs due to concern about IV contrast. Temp 100.8 F, given tylenol. Additional 1u PRBC given in evening for Hb 6.8.   : 1u prbc given in am. 6 units lantus started at bedtime. Per ID stopped meropenam and started ceftriaxone 1qd and flagyl 500 q8.  Plan for EGD tomorrow with GI.   : Tachycardic, gave 20 IV lasix and dc'd fluids in setting of congestion.  Hgb at the time stable       Vital Signs Last 24 Hrs  T(C): 37.9 (01 Aug 2023 05:41), Max: 38.2 (01 Aug 2023 00:34)  T(F): 100.2 (01 Aug 2023 05:41), Max: 100.8 (01 Aug 2023 00:34)  HR: 114 (01 Aug 2023 05:00) (106 - 119)  BP: 104/55 (01 Aug 2023 05:00) (91/50 - 113/57)  BP(mean): 73 (01 Aug 2023 05:00) (64 - 80)  RR: 19 (01 Aug 2023 05:00) (11 - 20)  SpO2: 98% (01 Aug 2023 05:00) (97% - 100%)    Parameters below as of 01 Aug 2023 05:00  Patient On (Oxygen Delivery Method): ventilator, CPAP    O2 Concentration (%): 40    I&O's Detail    2023 07:01  -  01 Aug 2023 07:00  --------------------------------------------------------  IN:    IV PiggyBack: 100 mL    Norepinephrine: 1.3 mL    Pantoprazole: 200 mL    Propofol: 13 mL    Propofol: 294.8 mL    sodium chloride 0.9%: 2160 mL  Total IN: 2769.1 mL    OUT:    Indwelling Catheter - Urethral (mL): 2735 mL    Intermittent Catheterization - Urethral (mL): 800 mL    Nasogastric/Oral tube (mL): 1300 mL  Total OUT: 4835 mL    Total NET: -2065.9 mL        I&O's Summary    2023 07:01  -  01 Aug 2023 07:00  --------------------------------------------------------  IN: 2769.1 mL / OUT: 4835 mL / NET: -2065.9 mL        PHYSICAL EXAM:  General: Patient laying in bed, intubated, in no apparent distress. Sedation held for exam.   HEENT: 4mm pupils equal and reactive to light bilaterally.   Cardiovascular: Clear S1 and S2 without murmurs, gallops or rubs auscultated.  Respiratory: intubated, Clear to ausculation bilaterally without wheezing, rhonchi or rales.  GI: Soft, nontender, nondistended. Positive bowel sounds in all four quadrants.  Neuro: opened eyes, tracks, grimacing to noxious stimuli, not FC. PERRL. Upper extremities AG to noxious bilaterally,  b/l LE withdrawal to noxious, +cough/gag/corneals  Extremities: 2+ Dorsalis Pedis and Posterior Tibial pulses bilaterally.    TUBES/LINES:  [x] Campuzano  [] Lumbar Drain  [] Wound Drains  [x] Others: a-line, R IJ CVC    DIET:  [X] NPO  [] Mechanical  [] Tube feeds  LABS:                        8.1    3.25  )-----------( 73       ( 01 Aug 2023 05:30 )             23.4     08-01    139  |  106  |  16  ----------------------------<  217<H>  3.2<L>   |  23  |  0.67    Ca    7.2<L>      01 Aug 2023 05:30  Phos  2.6     08-  Mg     1.7     08-    TPro  4.2<L>  /  Alb  2.2<L>  /  TBili  1.0  /  DBili  x   /  AST  21  /  ALT  15  /  AlkPhos  53  08-      Urinalysis Basic - ( 01 Aug 2023 05:30 )    Color: x / Appearance: x / SG: x / pH: x  Gluc: 217 mg/dL / Ketone: x  / Bili: x / Urobili: x   Blood: x / Protein: x / Nitrite: x   Leuk Esterase: x / RBC: x / WBC x   Sq Epi: x / Non Sq Epi: x / Bacteria: x      CARDIAC MARKERS ( 01 Aug 2023 05:30 )  x     / x     / 67 U/L / x     / x          CAPILLARY BLOOD GLUCOSE      POCT Blood Glucose.: 194 mg/dL (01 Aug 2023 06:11)  POCT Blood Glucose.: 265 mg/dL (2023 22:53)  POCT Blood Glucose.: 231 mg/dL (2023 17:56)  POCT Blood Glucose.: 249 mg/dL (2023 11:54)      Drug Levels: [] N/A    CSF Analysis: [] N/A      Allergies    amoxicillin (Rash)  ure-Na (Rash; Fever; Hypotension)    Intolerances      MEDICATIONS:  Antibiotics:  cefTRIAXone   IVPB 1000 milliGRAM(s) IV Intermittent every 24 hours  metroNIDAZOLE  IVPB      metroNIDAZOLE  IVPB 500 milliGRAM(s) IV Intermittent every 8 hours    Neuro:  acetaminophen     Tablet .. 650 milliGRAM(s) Oral every 6 hours PRN  escitalopram 5 milliGRAM(s) Oral daily  fentaNYL    Injectable 25 MICROGram(s) IV Push every 2 hours PRN  lacosamide IVPB 50 milliGRAM(s) IV Intermittent every 12 hours  ondansetron Injectable 4 milliGRAM(s) IV Push every 6 hours PRN  propofol Infusion 19.995 MICROgram(s)/kG/Min IV Continuous <Continuous>    Anticoagulation:    OTHER:  atorvastatin 20 milliGRAM(s) Oral at bedtime  chlorhexidine 0.12% Liquid 15 milliLiter(s) Oral Mucosa every 12 hours  chlorhexidine 2% Cloths 1 Application(s) Topical <User Schedule>  dexAMETHasone  Injectable 2 milliGRAM(s) IV Push every 12 hours  fluticasone propionate 50 MICROgram(s)/spray Nasal Spray 1 Spray(s) Both Nostrils two times a day  insulin glargine Injectable (LANTUS) 10 Unit(s) SubCutaneous at bedtime  insulin lispro (ADMELOG) corrective regimen sliding scale   SubCutaneous every 6 hours  pantoprazole Infusion 8 mG/Hr IV Continuous <Continuous>  psyllium Powder 1 Packet(s) Oral two times a day  sucralfate 1 Gram(s) Oral every 6 hours    IVF:  magnesium sulfate  IVPB 2 Gram(s) IV Intermittent once  multivitamin 1 Tablet(s) Oral daily  potassium chloride   Powder 40 milliEquivalent(s) Oral once  potassium phosphate IVPB 30 milliMole(s) IV Intermittent once  sodium chloride 3 Gram(s) Oral every 6 hours    CULTURES:  Culture Results:   No growth at 12 hours ( @ 09:02)  Culture Results:   No growth at 1 day. ( @ 12:52)    RADIOLOGY & ADDITIONAL TESTS:  < from: Xray Chest 1 View- PORTABLE-Routine (Xray Chest 1 View- PORTABLE-Routine in AM.) (23 @ 05:48) >  IMPRESSION:    Endotracheal tube in satisfactory position. Nasogastric tube courses off   image in region of stomach. Right venous catheter unchanged with tip at   superior vena cava. No lung infiltrate pleural effusion or pneumothorax.    < end of copied text >    < from: CT Abdomen and Pelvis w/ Oral Cont and w/ IV Cont (23 @ 14:50) >  IMPRESSION:  1.   Wall thickening in the colon suggesting an infectious or inflammatory  colitis. No bowel obstruction.  2.   Suspected small ulcer with surrounding edema in the 1st portion of   the  duodenum.  3.   Distended gallbladder without secondary signs of cholecystitis.   Consider  gallbladder ultrasound follow-up if there is concern for acute   cholecystitis.  4.   Mild ascites.  5.   Nonobstructing calyceal stones in the right kidney. No hydroneph.   rosis    < end of copied text >      ASSESSMENT:  67YO male PMH T2DM, HLD, JAGDISH, tracheal stenosis s/p tracheostomy (s/p closure); glioblastoma s/p resection 2022, and [place] Avastin treatment 3/2023 adm with aphasia, N/V and severe hypoNa (118), imaging showing disesase progression, Hospital course complicated by sepsis secondary to bacteremia, decompensated, intubated, started on antibiotics and upgraded to ICU on .     PLAN:  Neuro:  -neuro q4h/vitals q1hrs   -CTH : Interval development of a 13 mm hyperdense nodule along the inferior margin of the resection cavity which may represent progression of disease with hemorrhage. Repeat CTH stable, CTH  stable   -CTA : negative, CTA  stable  -CTP  stable.   -pain control prn, fentanyl IVP prn  -vimpat 50mg BID (switched from keppra 2/2 thrombocytopenia) for seizure tx  -Decadron 2mg BID  -c/w home Lexapro 5mg (home dose 5mg)  -home ASA 81 held i/s/o thrombocytopenia   -MRI brain  suspicious for recurrent tumor and hydro   -palliative following   -propofol gtt for sedation    Pulm:  -VC/AC 14/400/40/5    Cardio:  - MAP goal >65, on levo gtt prn   -  outpatient echo EF 65%    GI:  - OG tube to LIWS  - bowel regimen held for loose stools, LBM   - protonix gtt started for possible GI bleed  - sucralfate 1q6 while on steroids  - GI following, plan for EGD     Renal/:  - Hyponatremic (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 as well as SSRI use), s/p tolvaptan ; currently on NaCl tabs 3g q6h  - Trend BMP  - Maintenance fluids @ 120cc/hr  - Campuzano replaced     Endo:  -A1c 5.7  -lantus 10u qhs, ISS  - AM Cortisol to r/o Adrenal insufficiency   -h/o T2DM: home Januvia held  -h/o HLD: c/w Atorvastatin 20mg    Heme:  -SCDs for DVT ppx, SQL and Aspirin 81 held for thrombocytopenia   -heme consulted for thrombocytopenia- w/u sent, likely chronic thrombocytopenia d/t chemotherapy agent in past   -Per heme transfuse 1U plt if bleeding and platelet count <50k, if febrile and platelet count <20k  -1u PRBC, 1U PLT , 2u PRBC, 1u PLT   -LE dopplers  negative  -LUE doppler : L superficial cephalic vein thrombophlebitis  -CTA PE protocol for tachycardia: negative     ID:  - Pancultured , bcx growing K. pneumoniae, f/u blood cx   - meropenem 1g q8hrs (-), changed to Ceftriaxone /Flagyl per ID recs (- )  -Febrile, leukocytosis and tachycardia with elevated lactic acid on   -vancomycin/meropenem empirically (-), d/c per ID and repeat blood cx , NGTD    Dispo: NSICU status, full code, AR    D/w Dr. Dorado      Assessment:  Present when checked    []  GCS  E   V  M     Heart Failure: []Acute, [] acute on chronic , []chronic  Heart Failure:  [] Diastolic (HFpEF), [] Systolic (HFrEF), []Combined (HFpEF and HFrEF), [] RHF, [] Pulm HTN, [] Other    [] EUGENIA, [] ATN, [] AIN, [] other  [] CKD1, [] CKD2, [] CKD 3, [] CKD 4, [] CKD 5, []ESRD    Encephalopathy: [] Metabolic, [] Hepatic, [] toxic, [] Neurological, [] Other    Abnormal Nurtitional Status: [] malnurtition (see nutrition note), [ ]underweight: BMI < 19, [] morbid obesity: BMI >40, [] Cachexia    [] Sepsis  [] hypovolemic shock,[] cardiogenic shock, [] hemorrhagic shock, [] neuogenic shock  [] Acute Respiratory Failure  []Cerebral edema, [] Brain compression/ herniation,   [] Functional quadriplegia  [] Acute blood loss anemia   HPI:   Osiel Norwood is a 67YO male with a past medical history of type 2 diabetes, hyperlipidemia, iron deficiency anemia, tracheal stenosis s/p tracheostomy (which was closed by ENT 7 days ago); glioblastoma s/p resection 2022, and Avastin treatment 3/2023 presenting with altered mental status and weakness x2 days. Per family, they last saw him last night around 6pm when he was at baseline. Patient has intermittent expressive aphasia but is getting worse and now is persistent, and has intermittent nausea and vomiting since last night. Patient was supposed to receive an outpatient brain MRI on 2023.  Stroke code performed in  is negative for CVA. Head CT shows new hyperdensity in frontal parietal - surgical site. MRI is ordered to rule out tumor recurrence.   Per daughter at bedside, Entresto and Eliquis were stopped per his PC - cardiologist.     S/Overnight events:    ANA LILIA overnight. Neuro stable.     Hospital Course:   : Admitted. Na 115 in ED with repeat 118, started 3% at 30cc/hr, and salt tabs 3q6, stability CTH in ED showing Interval development of a 13 mm hyperdense nodule along the   inferior margin of the resection cavity which may represent progression   of disease with hemorrhage, repeat CTH stable, urine studies ordered  : Neuro stable, 12AM BMP Na114 3% increased to 40cc/hr, urine studies, pancx to r/o infection since pt is immunocompromised. Changed pantoprazole to sucralfate for GI ppx d/t thrombocytopenia. LE dopplers negative. DC'd 3%. ENT consulted to assess stoma, recommend follow up upon discharge with ENT, Repeat sodium 122. Restarted 3% at 30.  : ANA LILIA o/n neuro stable. remains on 3%@30cc/hr. Started florinef, increased 3% to 50cc/hr. Increased 3% to 75cc/hr.    7/10: continued current 3% rate o/n. Na 127 --> 125, cont 3% @75cc/hr, f/u repeat Na this evening, likely stepdown tomorrow. Pend dispo home with home PT/OT when able. Heme consulted for thrombocytopenia  : ANA LILIA o/n. Remains on 3% @75cc/hr. Decreased 3% to 50cc/hr. Started 1.2L fluid restriction. Repeat Na corrected 128  : ANA LILIA overnight, remains on 3%, SDU from ICU  : ANA LILIA overnight, neuro stable, on 3%@50, Am sodium 137, decreased 3% to 25cc/hr, repeat Na 138, decreased to 15cc/hr.  : ANA LILIA overnight, neuro stable, remains on 3%@15cc/hr  7/15: ANA LILIA overnight. Neuro exam stable. Pt remains on 3% saline.Sodium 134 this AM remains on 3%@25. Per nephrology recs hypertonic Na d'ceed, Na tabs 2q6, URENA 15g BID, cont florinef/ fluid restriction.Per nephrology Na >130 ok when ready for d/c   : ANA LILIA overnight, hypertonics d/c now on urea powder/1L fluid restriction/florinef and salt tabs per renal, hydrocortisone cream ordered for rash on back, rec for Home PT  : ANA LILIA overnight, following Na, renal following, ENT saw for hoarse voice rec followup with CT surgery for possible dilation, pending Home PT. Patient developed sudden onset severe headache. Hyperventilating with increased work of breathing. Wheezing in all lung fields to auscultation. Neuro intact on exam. Duoneb x1 ordered. Dilaudid 0.25 IV given for pain control. Subsequently patient developed nausea with multiple episodes of vomiting. Hypertensive with SBP in the 190s. Given hydralazine 10mg IVP, Zofran. Stroke code and rapid response called. STAT labs sent. CTH/CTA/CTP completed. Tachycardic to the 180s, consistent with SVT, remained hypertensive. Given 10 of labetalol, SVT broke. Transferred to Telemetry. WBC 23.43, lactate 8.5. Given 1L fluid bolus. Pan culture sent. Started empirically on Vanc/Meropenem.   : Put on eeg. Voiding while retaining urine, SC for 500cc. EEG +spikes, epilepsy consulted. CTA chest and CT A/P pending. Keppra increased 1g BID.   : Cont EEG, trend Na, possible NGT today if not able to tolerate PO. Blood cx growing GS + cocci in clusters, f/u MRSA swab and pan cx, cont meropenem and vancomycin. Vanc trough in am. F/u urine studies.   : Off EEG, neuro exam improved. ID following for concern for sepsis, likely aerobic blood cx bottle staph epi contaminant, cont monitoring off of abx, possible drug reaction as cause. F/u surveillance blood cx. Hyponatremia, repeat Na 129 overnight from 130, cont current regimen and f/u am labs and nephrology recs. AM na 129 continuing fluid restriction, nephro recommending restarting urena, but holding due to possible drug reaction previously.   : neurologically stable, c/w fluid restriction, f/u AM Na, urine osm, urine Na, cortisol. Given 15mg of tolvaptan. Fluid restriction, florinef d/c'd per nephro recs. Held salt tabs for today. 6pm BMP Na 122, 10pm BMP Na 131, urine osm 141  : ANA LILIA ovn, neuro stable. AM Na 133. Restarted salt tabs 3q6 and 1L fluid restriction. Lantus 10u at bedtime added. 9:30pm BMP per nephro Na 138  : ANA LILIA ovn, neuro stable. Na stable, discussed with nephrology can cont tolvaptan 15mg daily PRN for hyponatremia, only needed for hypoNa, can discontinue fluid restriction. Pt evaluated at bedside after nurse noted patient to have some expressive aphasia and perseverating; patient oriented to self, unable to say hospital or year, following all commands and DUMONT 5/5 strength, no drift, no facial droop, perseverating on pt's  when asked other questions. Pt afebrile, vitals stable, cont keppra 1g BID, cont decadron 4mg BID, discussed with Dr. Dorado, defer CTH at this time and monitor clinically. . Lantus increased to 14u at bedtime.  : o/n PSVT 13 beats followed by sinus tachycardia in setting of anxiety and net negative fluid balance.  Resolved to 102 with verbal comfort and further resolved with 500 cc NS. Lantus increased to 18u qhs. Dr. Costa consulted.  : ANA LILIA overnight. Neuro stable.   : ANA LILIA overnight. Neuro exam stable. Dispo pending.  : multiple BMs o/n. Na stable. Lexapro increased to 10mg daily for worsening depression. Held bowel regimen 2/2 multiple stools.   : ANA LILIA overnight, Na 133 f/u AM labs, pending [place]. Pt has episode of sustained tachycardia, c/o feeling warm, and dizzy while working with Pt. Given 500cc NS bolus, rectal temp was 99F. Family brought in medical marijuana. Pt had rectal temp 100.9, pancultured. Given another 1L bolus and started on maintenance fluids. Rectal temp 102. Started empirically on vanc/cipro/flagyl. Upgraded to telemetry.  : In AM patient had episode of emesis and was unresponsive to sternal rub. Dr. Huggins, neurointensivist at bedside. Repeat BP 60s/40s, tachycardic to 110s. Rapid response called. Levo gtt started. Upgraded to ICU. Intubated for airway protection. Likely septic shock given GNR in blood cx growing K.pneumoniae, Given additional 1L bolus x2. Abx changed to vanc and meropenem, ID following, rec to d/c vanc, c/w meropenem [id number], FOBT positive, keppra changed to vimpat 50BID, fentanyl 25mg q4hrs prn for vent dysynchrony, home lexapro decreased to 5mg, decadron decreased to 2q12, sucralfate increased to 1q6 iso GI bleed, pending repeat CBC, CMP, ABG, DIC labs, xray abdomen to r/o obstruction, propofol gtt started for sedation, weaning off precedex gtt, GI consulted rec protonix 40bid iso possible GI bleed, plan for possible EGD, albumin 1.7, 25% albumin 50ml given, s/p 1u PRBC and PLT repeat cbc hgb 8.0 from 6.6 and PLT 88 from 55. Protonix gtt started per GI recs.   : ANA LILIA overnight. Neuro stable. Hemoglobin 6.5, platelets 47 o/n, transfused 1uPRBC, 1u platelets in AM. Repeat Hb 7.1 and platelets 81. CBC repeated and Hb 7.2 and platelets 74. Arnie test negative. 1 set of surveillance blood cultures sent. NS increased to 120cc/hr. Holding off on CT C/A/P per nephrology recs due to concern about IV contrast. Temp 100.8 F, given tylenol. Additional 1u PRBC given in evening for Hb 6.8.   : 1u prbc given in am. 6 units lantus started at bedtime. Per ID stopped meropenam and started ceftriaxone 1qd and flagyl 500 q8.  Plan for EGD tomorrow with GI.   : Tachycardic, gave 20 IV lasix and dc'd fluids in setting of congestion.  Hgb at the time stable.       Vital Signs Last 24 Hrs  T(C): 37.9 (01 Aug 2023 05:41), Max: 38.2 (01 Aug 2023 00:34)  T(F): 100.2 (01 Aug 2023 05:41), Max: 100.8 (01 Aug 2023 00:34)  HR: 114 (01 Aug 2023 05:00) (106 - 119)  BP: 104/55 (01 Aug 2023 05:00) (91/50 - 113/57)  BP(mean): 73 (01 Aug 2023 05:00) (64 - 80)  RR: 19 (01 Aug 2023 05:00) (11 - 20)  SpO2: 98% (01 Aug 2023 05:00) (97% - 100%)    Parameters below as of 01 Aug 2023 05:00  Patient On (Oxygen Delivery Method): ventilator, CPAP    O2 Concentration (%): 40    I&O's Detail    2023 07:01  -  01 Aug 2023 07:00  --------------------------------------------------------  IN:    IV PiggyBack: 100 mL    Norepinephrine: 1.3 mL    Pantoprazole: 200 mL    Propofol: 13 mL    Propofol: 294.8 mL    sodium chloride 0.9%: 2160 mL  Total IN: 2769.1 mL    OUT:    Indwelling Catheter - Urethral (mL): 2735 mL    Intermittent Catheterization - Urethral (mL): 800 mL    Nasogastric/Oral tube (mL): 1300 mL  Total OUT: 4835 mL    Total NET: -2065.9 mL        I&O's Summary    2023 07:01  -  01 Aug 2023 07:00  --------------------------------------------------------  IN: 2769.1 mL / OUT: 4835 mL / NET: -2065.9 mL        PHYSICAL EXAM:  General: Patient laying in bed, intubated, in no apparent distress. Sedation held for exam.   HEENT: 4mm pupils equal and reactive to light bilaterally.   Cardiovascular: Clear S1 and S2 without murmurs, gallops or rubs auscultated.  Respiratory: intubated, Clear to ausculation bilaterally without wheezing, rhonchi or rales.  GI: Soft, nontender, nondistended. Positive bowel sounds in all four quadrants.  Neuro: opened eyes, midline gaze, grimacing to noxious stimuli, not FC. PERRL. Left Upper extremity AG to noxious, R upper extremity withdraws to noxious, b/l LE withdrawal to noxious, +cough/gag/corneals  Extremities: 2+ Dorsalis Pedis and Posterior Tibial pulses bilaterally.    TUBES/LINES:  [x] Campuzano  [] Lumbar Drain  [] Wound Drains  [x] Others: a-line, R IJ CVC    DIET:  [X] NPO  [] Mechanical  [] Tube feeds  LABS:                        8.1    3.25  )-----------( 73       ( 01 Aug 2023 05:30 )             23.4     08-01    139  |  106  |  16  ----------------------------<  217<H>  3.2<L>   |  23  |  0.67    Ca    7.2<L>      01 Aug 2023 05:30  Phos  2.6     08-  Mg     1.7     08-    TPro  4.2<L>  /  Alb  2.2<L>  /  TBili  1.0  /  DBili  x   /  AST  21  /  ALT  15  /  AlkPhos  53  08-      Urinalysis Basic - ( 01 Aug 2023 05:30 )    Color: x / Appearance: x / SG: x / pH: x  Gluc: 217 mg/dL / Ketone: x  / Bili: x / Urobili: x   Blood: x / Protein: x / Nitrite: x   Leuk Esterase: x / RBC: x / WBC x   Sq Epi: x / Non Sq Epi: x / Bacteria: x      CARDIAC MARKERS ( 01 Aug 2023 05:30 )  x     / x     / 67 U/L / x     / x          CAPILLARY BLOOD GLUCOSE      POCT Blood Glucose.: 194 mg/dL (01 Aug 2023 06:11)  POCT Blood Glucose.: 265 mg/dL (2023 22:53)  POCT Blood Glucose.: 231 mg/dL (2023 17:56)  POCT Blood Glucose.: 249 mg/dL (2023 11:54)      Drug Levels: [] N/A    CSF Analysis: [] N/A      Allergies    amoxicillin (Rash)  ure-Na (Rash; Fever; Hypotension)    Intolerances      MEDICATIONS:  Antibiotics:  cefTRIAXone   IVPB 1000 milliGRAM(s) IV Intermittent every 24 hours  metroNIDAZOLE  IVPB      metroNIDAZOLE  IVPB 500 milliGRAM(s) IV Intermittent every 8 hours    Neuro:  acetaminophen     Tablet .. 650 milliGRAM(s) Oral every 6 hours PRN  escitalopram 5 milliGRAM(s) Oral daily  fentaNYL    Injectable 25 MICROGram(s) IV Push every 2 hours PRN  lacosamide IVPB 50 milliGRAM(s) IV Intermittent every 12 hours  ondansetron Injectable 4 milliGRAM(s) IV Push every 6 hours PRN  propofol Infusion 19.995 MICROgram(s)/kG/Min IV Continuous <Continuous>    Anticoagulation:    OTHER:  atorvastatin 20 milliGRAM(s) Oral at bedtime  chlorhexidine 0.12% Liquid 15 milliLiter(s) Oral Mucosa every 12 hours  chlorhexidine 2% Cloths 1 Application(s) Topical <User Schedule>  dexAMETHasone  Injectable 2 milliGRAM(s) IV Push every 12 hours  fluticasone propionate 50 MICROgram(s)/spray Nasal Spray 1 Spray(s) Both Nostrils two times a day  insulin glargine Injectable (LANTUS) 10 Unit(s) SubCutaneous at bedtime  insulin lispro (ADMELOG) corrective regimen sliding scale   SubCutaneous every 6 hours  pantoprazole Infusion 8 mG/Hr IV Continuous <Continuous>  psyllium Powder 1 Packet(s) Oral two times a day  sucralfate 1 Gram(s) Oral every 6 hours    IVF:  magnesium sulfate  IVPB 2 Gram(s) IV Intermittent once  multivitamin 1 Tablet(s) Oral daily  potassium chloride   Powder 40 milliEquivalent(s) Oral once  potassium phosphate IVPB 30 milliMole(s) IV Intermittent once  sodium chloride 3 Gram(s) Oral every 6 hours    CULTURES:  Culture Results:   No growth at 12 hours ( @ 09:02)  Culture Results:   No growth at 1 day. ( @ 12:52)    RADIOLOGY & ADDITIONAL TESTS:  < from: Xray Chest 1 View- PORTABLE-Routine (Xray Chest 1 View- PORTABLE-Routine in AM.) (23 @ 05:48) >  IMPRESSION:    Endotracheal tube in satisfactory position. Nasogastric tube courses off   image in region of stomach. Right venous catheter unchanged with tip at   superior vena cava. No lung infiltrate pleural effusion or pneumothorax.    < end of copied text >    < from: CT Abdomen and Pelvis w/ Oral Cont and w/ IV Cont (23 @ 14:50) >  IMPRESSION:  1.   Wall thickening in the colon suggesting an infectious or inflammatory  colitis. No bowel obstruction.  2.   Suspected small ulcer with surrounding edema in the 1st portion of   the  duodenum.  3.   Distended gallbladder without secondary signs of cholecystitis.   Consider  gallbladder ultrasound follow-up if there is concern for acute   cholecystitis.  4.   Mild ascites.  5.   Nonobstructing calyceal stones in the right kidney. No hydroneph.   rosis    < end of copied text >      ASSESSMENT:  67YO male PMH T2DM, HLD, JAGDISH, tracheal stenosis s/p tracheostomy (s/p closure); glioblastoma s/p resection 2022, and IA Avastin treatment 3/2023 adm with aphasia, N/V and severe hypoNa (118), imaging showing disesase progression, Hospital course complicated by sepsis secondary to bacteremia, decompensated, intubated, started on antibiotics and upgraded to ICU on .     PLAN:  Neuro:  -neuro q4h/vitals q1hrs   -CTH : Interval development of a 13 mm hyperdense nodule along the inferior margin of the resection cavity which may represent progression of disease with hemorrhage. Repeat CTH stable, CTH  stable   -CTA : negative, CTA  stable  -CTP  stable.   -pain control prn, fentanyl IVP prn  -vimpat 50mg BID (switched from keppra 2/2 thrombocytopenia) for seizure tx  -Decadron 2mg BID  -c/w home Lexapro 5mg (home dose 5mg)  -home ASA 81 held i/s/o thrombocytopenia   -MRI brain  suspicious for recurrent tumor and hydro   -palliative following   -propofol gtt for sedation    Pulm:  -VC/AC 14/400/40/5    Cardio:  - MAP goal >65, on levo gtt prn   -  outpatient echo EF 65%    GI:  - OG tube to LIWS  - bowel regimen held for loose stools, LBM   - protonix gtt started for possible GI bleed  - sucralfate 1q6 while on steroids  - GI following, plan for EGD     Renal/:  - Hyponatremic (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 as well as SSRI use), s/p tolvaptan ; currently on NaCl tabs 3g q6h  - Trend BMP  - Maintenance fluids @ 120cc/hr  - Campuzano replaced     Endo:  -A1c 5.7  -lantus 10u qhs, ISS  - AM Cortisol to r/o Adrenal insufficiency   -h/o T2DM: home Januvia held  -h/o HLD: c/w Atorvastatin 20mg    Heme:  -SCDs for DVT ppx, SQL and Aspirin 81 held for thrombocytopenia   -heme consulted for thrombocytopenia- w/u sent, likely chronic thrombocytopenia d/t chemotherapy agent in past   -Per heme transfuse 1U plt if bleeding and platelet count <50k, if febrile and platelet count <20k  -1u PRBC, 1U PLT , 2u PRBC, 1u PLT   -LE dopplers  negative  -LUE doppler : L superficial cephalic vein thrombophlebitis  -CTA PE protocol for tachycardia: negative     ID:  - Pancultured , bcx growing K. pneumoniae, f/u blood cx   - meropenem 1g q8hrs (-), changed to Ceftriaxone /Flagyl per ID recs (- )  -Febrile, leukocytosis and tachycardia with elevated lactic acid on   -vancomycin/meropenem empirically (-), d/c per ID and repeat blood cx , NGTD    Dispo: NSICU status, full code, AR    D/w Dr. Boockvar      Assessment:  Present when checked    []  GCS  E   V  M     Heart Failure: []Acute, [] acute on chronic , []chronic  Heart Failure:  [] Diastolic (HFpEF), [] Systolic (HFrEF), []Combined (HFpEF and HFrEF), [] RHF, [] Pulm HTN, [] Other    [] EUGENIA, [] ATN, [] AIN, [] other  [] CKD1, [] CKD2, [] CKD 3, [] CKD 4, [] CKD 5, []ESRD    Encephalopathy: [] Metabolic, [] Hepatic, [] toxic, [] Neurological, [] Other    Abnormal Nurtitional Status: [] malnurtition (see nutrition note), [ ]underweight: BMI < 19, [] morbid obesity: BMI >40, [] Cachexia    [] Sepsis  [] hypovolemic shock,[] cardiogenic shock, [] hemorrhagic shock, [] neuogenic shock  [] Acute Respiratory Failure  []Cerebral edema, [] Brain compression/ herniation,   [] Functional quadriplegia  [] Acute blood loss anemia

## 2023-08-01 NOTE — CHART NOTE - NSCHARTNOTEFT_GEN_A_CORE
Admitting Diagnosis:   Patient is a 68y old  Male who presents with a chief complaint of Altered Mental Status & Weakness x2 days (01 Aug 2023 12:17)      PAST MEDICAL & SURGICAL HISTORY:  Hypertension      Glioblastoma  Grade 4 Gliosarcoma dx 2022      Type 2 diabetes mellitus      Hyperlipidemia      Iron deficiency anemia      Nephrolithiasis      Thrombocytopenia      Hyponatremia      BPH (benign prostatic hyperplasia)      S/P craniotomy      H/O tracheostomy    Current Nutrition Order: NPO    PO Intake: Good (%) [   ]  Fair (50-75%) [   ] Poor (<25%) [   ] [x] NPO    GI Issues: No nausea/vomiting documented at this time. Last documented bowel movement .    Pain:   Unable to assess at this time.    Skin Integrity: B/L arm, wrist, hand edema 2+. Vicente score: 13.    Labs:       139  |  106  |  16  ----------------------------<  217<H>  3.2<L>   |  23  |  0.67    Ca    7.2<L>      01 Aug 2023 05:30  Phos  2.6       Mg     1.7         TPro  4.2<L>  /  Alb  2.2<L>  /  TBili  1.0  /  DBili  x   /  AST  21  /  ALT  15  /  AlkPhos  53      CAPILLARY BLOOD GLUCOSE      POCT Blood Glucose.: 227 mg/dL (01 Aug 2023 11:06)  POCT Blood Glucose.: 194 mg/dL (01 Aug 2023 06:11)  POCT Blood Glucose.: 265 mg/dL (2023 22:53)  POCT Blood Glucose.: 231 mg/dL (2023 17:56)      Medications:  MEDICATIONS  (STANDING):  atorvastatin 20 milliGRAM(s) Oral at bedtime  cefTRIAXone   IVPB 1000 milliGRAM(s) IV Intermittent every 24 hours  chlorhexidine 0.12% Liquid 15 milliLiter(s) Oral Mucosa every 12 hours  chlorhexidine 2% Cloths 1 Application(s) Topical <User Schedule>  dexAMETHasone  Injectable 2 milliGRAM(s) IV Push every 12 hours  escitalopram 5 milliGRAM(s) Oral daily  fluticasone propionate 50 MICROgram(s)/spray Nasal Spray 1 Spray(s) Both Nostrils two times a day  insulin glargine Injectable (LANTUS) 14 Unit(s) SubCutaneous at bedtime  insulin lispro (ADMELOG) corrective regimen sliding scale   SubCutaneous every 6 hours  lacosamide IVPB 50 milliGRAM(s) IV Intermittent every 12 hours  metroNIDAZOLE  IVPB      metroNIDAZOLE  IVPB 500 milliGRAM(s) IV Intermittent every 8 hours  multivitamin 1 Tablet(s) Oral daily  pantoprazole Infusion 8 mG/Hr (10 mL/Hr) IV Continuous <Continuous>  propofol Infusion 19.995 MICROgram(s)/kG/Min (8.71 mL/Hr) IV Continuous <Continuous>  psyllium Powder 1 Packet(s) Oral two times a day  sodium chloride 3 Gram(s) Oral every 6 hours  sodium chloride 0.9%. 1000 milliLiter(s) (20 mL/Hr) IV Continuous <Continuous>  sucralfate 1 Gram(s) Oral every 6 hours    MEDICATIONS  (PRN):  acetaminophen     Tablet .. 650 milliGRAM(s) Oral every 6 hours PRN Temp greater or equal to 38C (100.4F), Mild Pain (1 - 3)  fentaNYL    Injectable 25 MICROGram(s) IV Push every 2 hours PRN vent dysynchrony  ondansetron Injectable 4 milliGRAM(s) IV Push every 6 hours PRN Nausea and/or Vomiting      Anthropometrics:  Height: 5'7"  Weight: 160lb/72.7kg  BMI: 25.1    IBW: 148lb/67.3kg    108% IBW    Weight Change:   No new weights obtained since admission. Recommend nursing to trend weights weekly. RD to continue monitoring weights as able.     Nutrition Focused Physical Exam:   Completed on , see nutrition risk notification in chart    Estimated energy needs:   Calories: 25-30kcal/k5749-1446 kcal/d  Protein: 1.2-1.5g/k-109g/d  Defer fluids to team  Estimated needs based on ABW as within % IBW. Needs adjusted for age, DM, vent demands, and malnutrition.    Subjective:   68 year old male with a past medical history of type 2 diabetes, hyperlipidemia, iron deficiency anemia, tracheal stenosis s/p tracheostomy (which was closed by ENT 7 days ago); glioblastoma s/p resection 2022, and Avastin treatment 3/2023 presenting with altered mental status, expressive aphasia, nausea/vomiting x1 day and weakness x2 days. CT shows new hyperdensity in left frontal parietal - surgical site c/f disease progression. : 1L fluid restriction. : Stroke code. : swallow eval, speech language pathologist recommends Regular with Thin liquids. Patient s/p rapid response ; intubated. GI following for concern for GIB.    Patient seen at bedside for follow up assessment- intubated (on CPAP mode), propofol held at bedside at time of assessment, however, per EMR, has since been restarted @ 4.4 mL/hr (116 kcal/day). MAP 69. Labs reviewed ; pt hypokalemic. Fingersticks -: 193-265 mg/dL; insulin regimen ordered. Pt asleep at time of RD assessment; unable to obtain subjective information. RD to follow up. See nutrition recommendations below.    Previous Nutrition Diagnosis:  Malnutrition (moderate) related to chronic disease as evidenced by <75% nutrition needs >1 month, mild muscle wasting     Active [ x ]  Resolved [   ]    Goal:  Consistently meet >75% nutrient needs via tolerated route    Recommendations:  1. If medically warranted, recommend EN via NGT: Glucerna 1.5 @ 57 mL/hr x 24 hours. Start EN @ 10 mL/hr increasing by 10q6h. This provides: 1368 mL TV, 2052 kcal, 112.9 g protein, 1038 mL free water. Meetin kcal/kg, 1.6 g/kg protein based on IBW 72.5 kg. EN @ goal rate + propofol (116 kcal/day) provides 2168 kcal/ 30 kcal/kg based on IBW. Defer free water flushes to team. Maintain aspiration precautions. Monitor s/s of intolerance; adjust EN as needed.   >>if unable to initiate EN within 24 hours, consider TPN to optimize nutritional status  2. Monitor GI tolerance, weight trends, labs, & skin integrity.  3. Defer bowel and pain regimens to team.   4. RD to remain available for diet education/intervention prn.     Education: Deferred    Risk Level: High [ x ] Moderate [   ] Low [   ].

## 2023-08-01 NOTE — HISTORY OF PRESENT ILLNESS
[FreeTextEntry1] : Mr. JORDAN CHAKRABORTY is a 68 year old male who returns for endocrine evaluation with regard to a hx of type 2 dm. The diabetes has been present for about 1.5 years. Patient accompanied by daughter and son-in-law.  He was diagnosed with a Gliosarcoma s/p resection of the left frontal enhancing tumor in January 2022 and Gleolan placed  He too had developed Tracheal stenosis and is s/p recent Tracheal balloon dilation on 3/2/22 and tracheostomy on 3/17/22- wWas on chemotherapy and radiation. As part of his chemo regimen, he was given glucocorticoids which did elevate his glucose.  Recently was treated with Avastin via catheter to get close to the lesion and pass the blood-brain barrier. He developed CHF with reduced ejection fraction after procedure.  He denies any history of retinopathy or nephropathy. With regard to neuropathy,he denies any specific diabetic neuropathy type s/s.  For the diabetes, he is currently taking Januvia 100 mg.  HGM has shown to be running in the   A1c resulted at 5.9% today, previously 5.6% on 4/4/23  Patient has been off corticosteroids since February of this year.  Does note about 40 lbs of weight loss. Patient has met with GI. Son in law notes that his appetite has improved as of late. Patient can only have soft foods or soup.  He denies polyuria, polydipsia, or any visual changes. He too denies any skin lesions, skin breakdown or non-healing areas of skin. He too denies any podiatric concerns. Ophthalmologic evaluation is up to date.  Additional medical history includes that of Cva about 10-15 years ago, hyponatremia, hypotension  Doses have trach in place. Is able to swallow. PO intake is reduced from prior-mostly soft foods.  Weight in 170'as pre ca dx, now 163 lbs  FH DM2 Two SIblings  Meds: Atorvastatin, Gabapentin, Escitalopram and Vimpat along with Melatonin 3 mg and Nacl tabs

## 2023-08-01 NOTE — PROGRESS NOTE ADULT - ASSESSMENT
68m hx GBM s/p resection, recent chemo, type 2 DM, admit septic shock 2/2 klebsiella bacteremia; s/p EGD - severe erosive esophagitis     -s/p EGD today; c/w Protonix drip; Carafate suspension; goal repeat EGD in 6-8 weeks   -NGT placed w/ direct visualization via EGD; NPO w/ meds   h/h stable thrombocytopenia stable  -s/p lasix today, monitor urine output   -neuro check q4  -sedation w/ precedex RASS goal -1 to -2   -on mechanical vent; CPAP trials as tolerated  -c/w ceftriaxone & flagyl

## 2023-08-01 NOTE — PROGRESS NOTE ADULT - ASSESSMENT
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  He was medically managed.  Course c/b septic shock due to K.pneumo bacteremia, patient has +FOBT, Hgb drop from 9 to 6.7, and billous vomiting, suspect due to gut translocation in setting of GIB. Per team, patient going for CT C/A/P today to eval for source of bleed. Kleb pneumo sensitive to CTX on susceptibilities- can narrow therapy from meropenem.     Suggest:  -F/u Bcxs 7/30 and 7/31  -F/u sputum culture   -cont CTX 1g IV q24h (coverage of Kleb pneumo)  -cont Flagyl 500 mg IV Q8h (to add coverage of other anaerobes)     Team 2 will follow you.    Case d/w primary team.  Final recommendation pending attending note.    Kelsey Jolley, Infectious Diseases PA  Please reach out for any questions 9 am-5pm. For evenings and weekends, please call the ID physician on call.  Work cell: [phone]    68M h/o GBM s/p resection 1/27/2022 and Avastin 3/2023, T2DM, HLD, trachal stenosis s/p tracheostomy which was closed p/w AMS, found to have severe hyponatremia.  He was medically managed.  Course c/b septic shock due to K.pneumo bacteremia, patient has +FOBT, Hgb drop from 9 to 6.7, and billous vomiting, suspect due to gut translocation in setting of GIB.  CT C/A/P with ? infectious vs inflammatory colitis. Patient not having diarrhea. Kleb pneumo sensitive to CTX on susceptibilities. Going for EGD today.     Suggest:  -F/u Bcxs 7/30 and 7/31  -F/u sputum culture   -cont CTX 1g IV q24h (coverage of Kleb pneumo)  -cont Flagyl 500 mg IV Q8h (to add coverage of other anaerobes)     Team 2 will follow you.    Case d/w primary team.  Final recommendation pending attending note.    Kelsey Jolley, Infectious Diseases PA  Please reach out for any questions 9 am-5pm. For evenings and weekends, please call the ID physician on call.  Work cell: [phone]

## 2023-08-01 NOTE — PROGRESS NOTE ADULT - ASSESSMENT
ASSESSMENT:   67 y/o M with septic shock secondary to klebsiella bacteremia   GBM s/p resection, recent chemotherapy    History of Takotsubo cardiomyopathy  Chronic SIADH  Type 2 DM    NEURO:  Neurochecks q4  Analgesia  Sedation: On propofol RASS goal 0 to neg 1 (SUSHMA labs)  Cerebral edema: On dexamethasone 2mg Q12  Activity: Bedrest for now. PT/OT evaluation when stable     PULMONARY: Currently requiring mechanical ventilation for airway protection   Vent settings 14/400/40/5  CXR, ABG  VAP bundle     CARDIOVASCULAR: Hypotension in setting of likely septic shock and acute blood loss anemia 2/2 suspected GIB  Norepinephrine ggt MAP goal >65- wean off  Flotrac monitoring- DC  TTE ordered and pending  POCUS    GASTROENTEROLOGY: Suspected GI bleed  Fecal occult (+), stool brown color   NPO: OG tube to intermittent suction w/ coffee ground appearing fluid   Will likely need TPN   GI consulted for endoscopy evaluation- 8/1 scope  GI ppx: On protonix gtt and sucralfate  Trend H/H  CT chest/ abd/ pelvis.    RENAL/: Dark urine; ?myoglobin vs hematuria vs bilirubinemia. Chronic SIADH  Monitor BMPs Q12 to daily  Strict Is/Os. Pt critically ill, continues to need Campuzano- deescalate once appropriate.  Na goal 135-145; replete lytes   Monitor CPK, trigs, LFTs while on propofol  Repeat lactate 7/31    ENDOCRINE: Diabetes Mellitus  A1c- 5.7  ISS while NPO  Lantus 6u qhs  Cortisol level     HEME/ONC: Pancytopenia ?chemo-induced vs DIC, JAGDISH.  DIC labs  Concern for GIB: Trend H/H and plt. Hb goal >7.0, plt goal >80  DVT ppx: will hold chemical DVT ppx in setting of low platelets  B/L SCDs  Transfused 3 PRBCs and 3 plts in 24hours  Consider heme consult.    INFECTIOUS: Klebsiella bacteremia ?source  On meropenem   ID following  Repeat blood cultures 7/31 and daily. NGTD from 7/30  Monitor for fevers   Follow up TTE    MISC:  Palliative care consult    SOCIAL/FAMILY:  [] awaiting [x] updated at bedside [] family meeting    CODE STATUS:  [x] Full Code [] DNR [] DNI [] Palliative/Comfort Care    DISPOSITION:  [x] ICU [] Stroke Unit [] Floor [] EMU [] RCU [] PCU    Time spent: 60 critical care minutes     ASSESSMENT:   67 y/o M with septic shock secondary to klebsiella bacteremia   GBM s/p resection, recent chemotherapy  History of Takotsubo cardiomyopathy  Chronic SIADH  Type 2 DM    NEURO:  Neurochecks q4  Analgesia- fentanyl prn  Sedation: On propofol RASS goal 0 to neg 1 (SUSHMA labs)  Seizure history: Continue vimpat  Cerebral edema: On dexamethasone 2mg Q12  Activity: Bedrest for now. PT/OT evaluation when stable   Palliative care consult    PULMONARY: Currently requiring mechanical ventilation for airway protection   Vent settings 14/400/40/5  CXR, ABG  VAP bundle     CARDIOVASCULAR: Hypotension in setting of likely septic shock and acute blood loss anemia 2/2 suspected GIB  MAP goal >65. Off pressors  TTE: EF 60-65%. No WMA, no vegetation  POCUS prn  S/P Lasix 20mg x 1    GASTROENTEROLOGY: Suspected GI bleed  Fecal occult (+), stool brown color   NPO: OG tube to intermittent suction w/ coffee ground appearing fluid   May need TPN if unable to feed in next [id number] hours   GI consulted for endoscopy evaluation- 8/1 scope  GI ppx: On protonix gtt and sucralfate  CT chest/ abd/ pelvis ? small ulcer at first portion of duodenum    RENAL/: Dark urine; ?myoglobin vs hematuria vs bilirubinemia. Chronic SIADH  Monitor BMP daily  Strict Is/Os. Pt critically ill, continues to need Campuzano- deescalate once appropriate.  Na goal 135-145; replete lytes   Monitor CPK, trigs, LFTs while on propofol  Repeat lactate 8/1:     ENDOCRINE: Diabetes Mellitus  A1c- 5.7  ISS while NPO  Lantus 14u qhs  Cortisol level wnl    HEME/ONC: Pancytopenia ?chemo-induced vs DIC, JAGDISH.  DIC labs reviewed  Concern for GIB: Trend H/H and plt. Hb goal >7.0, plt goal >80  DVT ppx: will hold chemical DVT ppx in setting of low platelets/ bleed  B/L SCDs  Transfused 3 PRBCs and 3 plts in 24hours  Heme following    INFECTIOUS: Klebsiella bacteremia ?source  S/P meropenem-> CTX and flagyl  ID following  Repeat blood cultures 7/31 and daily. NGTD from 7/30 and 7/31  Monitor for fevers     MISC:  Palliative care consult    SOCIAL/FAMILY:  [] awaiting [x] updated at bedside [] family meeting    CODE STATUS:  [x] Full Code [] DNR [] DNI [] Palliative/Comfort Care    DISPOSITION:  [x] ICU [] Stroke Unit [] Floor [] EMU [] RCU [] PCU    Time spent: 60 critical care minutes

## 2023-08-01 NOTE — PROGRESS NOTE ADULT - SUBJECTIVE AND OBJECTIVE BOX
INTERVAL HISTORY: HPI:  Osiel Norwood is a 67YO male with a past medical history of type 2 diabetes, hyperlipidemia, iron deficiency anemia, tracheal stenosis s/p tracheostomy (which was closed by ENT 7 days ago); glioblastoma s/p resection 1/27/2022, and Avastin treatment 3/2023 presenting with altered mental status and weakness x2 days. Per family, they last saw him last night around 6pm when he was at baseline. Patient has intermittent expressive aphasia but is getting worse and now is persistent, and has intermittent nausea and vomiting since last night. Patient was supposed to receive an outpatient brain MRI on 7/25/2023.  Stroke code performed in  is negative for CVA. Head CT shows new hyperdensity in frontal parietal - surgical site. MRI is ordered to rule out tumor recurrence.   Per daughter at bedside, Entresto and Eliquis were stopped per his PC - cardiologist.      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  Type 2 diabetes mellitus  Hyperlipidemia  Iron deficiency anemia  Nephrolithiasis  Thrombocytopenia  Hyponatremia  BPH (benign prostatic hyperplasia)  S/P craniotomy  H/O tracheostomy      REVIEW OF SYSTEMS: [x] Unable to Assess due to neurologic exam   [ ] All ROS addressed below are non-contributory, except:  Neuro: [ ] Headache [ ] Back pain [ ] Numbness [ ] Weakness [ ] Ataxia [ ] Dizziness [ ] Aphasia [ ] Dysarthria [ ] Visual disturbance  Resp: [ ] Shortness of breath/dyspnea, [ ] Orthopnea [ ] Cough  CV: [ ] Chest pain [ ] Palpitation [ ] Lightheadedness [ ] Syncope  Renal: [ ] Thirst [ ] Edema  GI: [ ] Nausea [ ] Emesis [ ] Abdominal pain [ ] Constipation [ ] Diarrhea  Hem: [ ] Hematemesis [ ] bright red blood per rectum  ID: [ ] Fever [ ] Chills [ ] Dysuria  ENT: [ ] Rhinorrhea    PHYSICAL EXAM:    General: No Acute Distress, intubated   Neurological: grimace to pain, not following commands, eyes not open to nox, pupils 3mm reactive & midline b/l, WD all extremities to nos, +cough/+gag,   Pulmonary: Clear to Auscultation, No Rales, No Rhonchi, No Wheezes   Cardiovascular: S1, S2, Regular Rate and Rhythm   Gastrointestinal: Soft, Nontender, Nondistended   Extremities: No edema             ICU Vital Signs Last 24 Hrs  T(C): 38 (01 Aug 2023 22:00), Max: 38.2 (01 Aug 2023 00:34)  T(F): 100.4 (01 Aug 2023 22:00), Max: 100.8 (01 Aug 2023 00:34)  HR: 100 (01 Aug 2023 23:00) (96 - 118)  BP: 117/63 (01 Aug 2023 23:00) (91/50 - 117/66)  BP(mean): 84 (01 Aug 2023 23:00) (64 - 86)  ABP: 128/57 (01 Aug 2023 23:00) (95/41 - 136/52)  ABP(mean): 82 (01 Aug 2023 23:00) (57 - 82)  RR: 11 (01 Aug 2023 23:00) (11 - 31)  SpO2: 99% (01 Aug 2023 23:00) (97% - 100%)      07-31-23 @ 07:01 - 08-01-23 @ 07:00  --------------------------------------------------------  IN: 3074.1 mL / OUT: 5835 mL / NET: -2760.9 mL    08-01-23 @ 07:01 - 08-01-23 @ 23:43  --------------------------------------------------------  IN: 959 mL / OUT: 1340 mL / NET: -381 mL        Mode: AC/ CMV (Assist Control/ Continuous Mandatory Ventilation), RR (machine): 14, TV (machine): 400, FiO2: 40, PEEP: 5, ITime: 1, MAP: 8.4, PIP: 15    acetaminophen     Tablet .. 650 milliGRAM(s) Oral every 6 hours PRN  atorvastatin 20 milliGRAM(s) Oral at bedtime  cefTRIAXone   IVPB 1000 milliGRAM(s) IV Intermittent every 24 hours  chlorhexidine 0.12% Liquid 15 milliLiter(s) Oral Mucosa every 12 hours  chlorhexidine 2% Cloths 1 Application(s) Topical <User Schedule>  dexAMETHasone  Injectable 2 milliGRAM(s) IV Push every 12 hours  dexMEDEtomidine Infusion 0.2 MICROgram(s)/kG/Hr (3.63 mL/Hr) IV Continuous <Continuous>  escitalopram 5 milliGRAM(s) Oral daily  fentaNYL    Injectable 25 MICROGram(s) IV Push every 2 hours PRN  fluticasone propionate 50 MICROgram(s)/spray Nasal Spray 1 Spray(s) Both Nostrils two times a day  insulin glargine Injectable (LANTUS) 14 Unit(s) SubCutaneous at bedtime  insulin lispro (ADMELOG) corrective regimen sliding scale   SubCutaneous every 6 hours  lacosamide IVPB 50 milliGRAM(s) IV Intermittent every 12 hours  metroNIDAZOLE  IVPB      metroNIDAZOLE  IVPB 500 milliGRAM(s) IV Intermittent every 8 hours  multivitamin 1 Tablet(s) Oral daily  norepinephrine Infusion 0.05 MICROgram(s)/kG/Min (6.81 mL/Hr) IV Continuous <Continuous>  ondansetron Injectable 4 milliGRAM(s) IV Push every 6 hours PRN  pantoprazole Infusion 8 mG/Hr (10 mL/Hr) IV Continuous <Continuous>  psyllium Powder 1 Packet(s) Oral two times a day  sodium chloride 3 Gram(s) Oral every 6 hours  sucralfate 1 Gram(s) Oral every 6 hours      LABS:  Na: 139 (08-01 @ 05:30), 136 (07-31 @ 04:13), 136 (07-30 @ 23:40), 139 (07-30 @ 17:28), 135 (07-30 @ 06:28), 137 (07-30 @ 01:16)  K: 3.2 (08-01 @ 05:30), 3.7 (07-31 @ 04:13), 4.0 (07-30 @ 23:40), 3.6 (07-30 @ 17:28), 3.3 (07-30 @ 06:28), 3.4 (07-30 @ 01:16)  Cl: 106 (08-01 @ 05:30), 110 (07-31 @ 04:13), 110 (07-30 @ 23:40), 113 (07-30 @ 17:28), 110 (07-30 @ 06:28), 111 (07-30 @ 01:16)  CO2: 23 (08-01 @ 05:30), 18 (07-31 @ 04:13), 20 (07-30 @ 23:40), 20 (07-30 @ 17:28), 16 (07-30 @ 06:28), 17 (07-30 @ 01:16)  BUN: 16 (08-01 @ 05:30), 15 (07-31 @ 04:13), 15 (07-30 @ 23:40), 16 (07-30 @ 17:28), 18 (07-30 @ 06:28), 18 (07-30 @ 01:16)  Cr: 0.67 (08-01 @ 05:30), 0.56 (07-31 @ 04:13), 0.56 (07-30 @ 23:40), 0.62 (07-30 @ 17:28), 0.66 (07-30 @ 06:28), 0.66 (07-30 @ 01:16)  Glu: 217(08-01 @ 05:30), 231(07-31 @ 04:13), 205(07-30 @ 23:40), 198(07-30 @ 17:28), 217(07-30 @ 06:28), 183(07-30 @ 01:16)    Hgb: 8.1 (08-01 @ 05:30), 8.0 (07-31 @ 23:56), 8.2 (07-31 @ 09:21), 7.0 (07-31 @ 04:13), 7.4 (07-30 @ 23:40), 6.8 (07-30 @ 17:28), 7.2 (07-30 @ 09:58), 7.1 (07-30 @ 06:28), 6.5 (07-30 @ 01:16)  Hct: 23.4 (08-01 @ 05:30), 23.1 (07-31 @ 23:56), 24.1 (07-31 @ 09:21), 21.0 (07-31 @ 04:13), 21.3 (07-30 @ 23:40), 20.1 (07-30 @ 17:28), 21.5 (07-30 @ 09:58), 21.2 (07-30 @ 06:28), 19.4 (07-30 @ 01:16)  WBC: 3.25 (08-01 @ 05:30), 3.09 (07-31 @ 23:56), 2.65 (07-31 @ 09:21), 3.34 (07-31 @ 04:13), 4.35 (07-30 @ 23:40), 4.64 (07-30 @ 17:28), 5.49 (07-30 @ 09:58), 5.02 (07-30 @ 06:28), 6.26 (07-30 @ 01:16)  Plt: 73 (08-01 @ 05:30), 75 (07-31 @ 23:56), 88 (07-31 @ 09:21), 117 (07-31 @ 04:13), 78 (07-30 @ 23:40), 55 (07-30 @ 17:28), 74 (07-30 @ 09:58), 81 (07-30 @ 06:28), 47 (07-30 @ 01:16)    INR:   PTT:           LIVER FUNCTIONS - ( 01 Aug 2023 05:30 )  Alb: 2.2 g/dL / Pro: 4.2 g/dL / ALK PHOS: 53 U/L / ALT: 15 U/L / AST: 21 U/L / GGT: x           ABG - ( 01 Aug 2023 05:46 )  pH, Arterial: 7.48  pH, Blood: x     /  pCO2: 31    /  pO2: 160   / HCO3: 23    / Base Excess: 0.4   /  SaO2: 100.0

## 2023-08-01 NOTE — PROGRESS NOTE ADULT - ASSESSMENT
67 yo M from home PMHx GBM sp glioblastoma s/p resection 1/27/2022, LD Avastin treatment 3/2023 cb chronic hyponatremia, type 2 diabetes, iron deficiency anemia, tracheal stenosis s/p tracheostomy (closed by ENT 7/1); who presented 7/7 with altered mental status and weakness x2 days. Prolonged hospital course cb metabolic encephalopathy, septic shock 2/2 bacteremia requiring intubation 7/29, GIB.  MRI suspicious for recurrent tumor. Palliative following for complex medical decision making / psychosocial support in the setting of advanced illness.

## 2023-08-01 NOTE — CHART NOTE - NSCHARTNOTEFT_GEN_A_CORE
EGD performed at bedside for anemia and ?coffee-ground emesis.    Impressions:  	Grade D esophagitis in the whole esophagus compatible with severe erosive esophagitis.  	Small non-bleeding ulcer in the anterior wall of the stomach.  	Hiatal Hernia.  	Food in the fundus.  	Friable and edematous mucosa in the whole examined duodenum.  	NG tube in the esophagus and stomach. Due to difficulty advancing the scope the NG tube was removed and replaced under direct endoscopic visualization at the end of the case.    Based on the EGD findings, his anemia and emesis is most likely due to esophagitis exacerbated in setting of acute medical illness.    Plan:	  Protonix 8 mg/hr continuous drip  Carafate Suspension 1g po qid x 4 weeks  Advance diet as tolerated  Repeat EGD in 6-8 weeks if within goals of care    Vaughn (Terri Leija MD  Gastroenterology Fellow  Pager (M-F 7a-5p): [phone]  Pager (after hours): Please call  for on-call fellow

## 2023-08-01 NOTE — PROGRESS NOTE ADULT - SUBJECTIVE AND OBJECTIVE BOX
INFECTIOUS DISEASES CONSULT FOLLOW-UP NOTE    INTERVAL HPI/OVERNIGHT EVENTS:    Patient seen and examined at bedside. Intubated and sedated. On pressors.  Febrile to 100.8 this AM. Unable to obtain ROS.       ROS:   Constitutional, eyes, ENT, cardiovascular, respiratory, gastrointestinal, genitourinary, integumentary, neurological, psychiatric and heme/lymph are otherwise negative other than noted above       ANTIBIOTICS/RELEVANT:    MEDICATIONS  (STANDING):  atorvastatin 20 milliGRAM(s) Oral at bedtime  cefTRIAXone   IVPB 1000 milliGRAM(s) IV Intermittent every 24 hours  chlorhexidine 0.12% Liquid 15 milliLiter(s) Oral Mucosa every 12 hours  chlorhexidine 2% Cloths 1 Application(s) Topical <User Schedule>  dexAMETHasone  Injectable 2 milliGRAM(s) IV Push every 12 hours  escitalopram 5 milliGRAM(s) Oral daily  fluticasone propionate 50 MICROgram(s)/spray Nasal Spray 1 Spray(s) Both Nostrils two times a day  insulin glargine Injectable (LANTUS) 10 Unit(s) SubCutaneous at bedtime  insulin lispro (ADMELOG) corrective regimen sliding scale   SubCutaneous every 6 hours  lacosamide IVPB 50 milliGRAM(s) IV Intermittent every 12 hours  metroNIDAZOLE  IVPB      metroNIDAZOLE  IVPB 500 milliGRAM(s) IV Intermittent every 8 hours  multivitamin 1 Tablet(s) Oral daily  pantoprazole Infusion 8 mG/Hr (10 mL/Hr) IV Continuous <Continuous>  propofol Infusion 19.995 MICROgram(s)/kG/Min (8.71 mL/Hr) IV Continuous <Continuous>  psyllium Powder 1 Packet(s) Oral two times a day  sodium chloride 3 Gram(s) Oral every 6 hours  sucralfate 1 Gram(s) Oral every 6 hours    MEDICATIONS  (PRN):  acetaminophen     Tablet .. 650 milliGRAM(s) Oral every 6 hours PRN Temp greater or equal to 38C (100.4F), Mild Pain (1 - 3)  fentaNYL    Injectable 25 MICROGram(s) IV Push every 2 hours PRN vent dysynchrony  ondansetron Injectable 4 milliGRAM(s) IV Push every 6 hours PRN Nausea and/or Vomiting        Vital Signs Last 24 Hrs  T(C): 37.7 (01 Aug 2023 09:28), Max: 38.2 (01 Aug 2023 00:34)  T(F): 99.9 (01 Aug 2023 09:28), Max: 100.8 (01 Aug 2023 00:34)  HR: 109 (01 Aug 2023 08:00) (107 - 119)  BP: 98/55 (01 Aug 2023 08:00) (91/50 - 110/59)  BP(mean): 73 (01 Aug 2023 08:00) (64 - 80)  RR: 13 (01 Aug 2023 08:00) (13 - 20)  SpO2: 100% (01 Aug 2023 08:00) (97% - 100%)    Parameters below as of 01 Aug 2023 08:00  Patient On (Oxygen Delivery Method): ventilator, CPAP    O2 Concentration (%): 40    07-31-23 @ 07:01  -  08-01-23 @ 07:00  --------------------------------------------------------  IN: 3074.1 mL / OUT: 5835 mL / NET: -2760.9 mL    08-01-23 @ 07:01  -  08-01-23 @ 11:13  --------------------------------------------------------  IN: 10 mL / OUT: 75 mL / NET: -65 mL      PHYSICAL EXAM:  Constitutional: alert, NAD  Eyes: the sclera and conjunctiva were normal.   ENT: the ears and nose were normal in appearance. intubated   Neck: the appearance of the neck was normal and the neck was supple.   Pulmonary: no respiratory distress and lungs were clear to auscultation bilaterally.   Heart: heart rate was normal and rhythm regular, normal S1 and S2  Vascular:. there was no peripheral edema  Abdomen: normal bowel sounds, soft, non-tender  Neurological: no focal deficits.   Psychiatric: the affect was normal        LABS:                        8.1    3.25  )-----------( 73       ( 01 Aug 2023 05:30 )             23.4     08-01    139  |  106  |  16  ----------------------------<  217<H>  3.2<L>   |  23  |  0.67    Ca    7.2<L>      01 Aug 2023 05:30  Phos  2.6     08-01  Mg     1.7     08-01    TPro  4.2<L>  /  Alb  2.2<L>  /  TBili  1.0  /  DBili  x   /  AST  21  /  ALT  15  /  AlkPhos  53  08-01      Urinalysis Basic - ( 01 Aug 2023 05:30 )    Color: x / Appearance: x / SG: x / pH: x  Gluc: 217 mg/dL / Ketone: x  / Bili: x / Urobili: x   Blood: x / Protein: x / Nitrite: x   Leuk Esterase: x / RBC: x / WBC x   Sq Epi: x / Non Sq Epi: x / Bacteria: x        MICROBIOLOGY:  review    RADIOLOGY & ADDITIONAL STUDIES:  Reviewed INFECTIOUS DISEASES CONSULT FOLLOW-UP NOTE    INTERVAL HPI/OVERNIGHT EVENTS:    Patient seen and examined at bedside. Intubated and sedated. Off pressors.   Febrile to 100.8 this AM. Unable to obtain ROS.       ROS:   Constitutional, eyes, ENT, cardiovascular, respiratory, gastrointestinal, genitourinary, integumentary, neurological, psychiatric and heme/lymph are otherwise negative other than noted above       ANTIBIOTICS/RELEVANT:    MEDICATIONS  (STANDING):  atorvastatin 20 milliGRAM(s) Oral at bedtime  cefTRIAXone   IVPB 1000 milliGRAM(s) IV Intermittent every 24 hours  chlorhexidine 0.12% Liquid 15 milliLiter(s) Oral Mucosa every 12 hours  chlorhexidine 2% Cloths 1 Application(s) Topical <User Schedule>  dexAMETHasone  Injectable 2 milliGRAM(s) IV Push every 12 hours  escitalopram 5 milliGRAM(s) Oral daily  fluticasone propionate 50 MICROgram(s)/spray Nasal Spray 1 Spray(s) Both Nostrils two times a day  insulin glargine Injectable (LANTUS) 10 Unit(s) SubCutaneous at bedtime  insulin lispro (ADMELOG) corrective regimen sliding scale   SubCutaneous every 6 hours  lacosamide IVPB 50 milliGRAM(s) IV Intermittent every 12 hours  metroNIDAZOLE  IVPB      metroNIDAZOLE  IVPB 500 milliGRAM(s) IV Intermittent every 8 hours  multivitamin 1 Tablet(s) Oral daily  pantoprazole Infusion 8 mG/Hr (10 mL/Hr) IV Continuous <Continuous>  propofol Infusion 19.995 MICROgram(s)/kG/Min (8.71 mL/Hr) IV Continuous <Continuous>  psyllium Powder 1 Packet(s) Oral two times a day  sodium chloride 3 Gram(s) Oral every 6 hours  sucralfate 1 Gram(s) Oral every 6 hours    MEDICATIONS  (PRN):  acetaminophen     Tablet .. 650 milliGRAM(s) Oral every 6 hours PRN Temp greater or equal to 38C (100.4F), Mild Pain (1 - 3)  fentaNYL    Injectable 25 MICROGram(s) IV Push every 2 hours PRN vent dysynchrony  ondansetron Injectable 4 milliGRAM(s) IV Push every 6 hours PRN Nausea and/or Vomiting        Vital Signs Last 24 Hrs  T(C): 37.7 (01 Aug 2023 09:28), Max: 38.2 (01 Aug 2023 00:34)  T(F): 99.9 (01 Aug 2023 09:28), Max: 100.8 (01 Aug 2023 00:34)  HR: 109 (01 Aug 2023 08:00) (107 - 119)  BP: 98/55 (01 Aug 2023 08:00) (91/50 - 110/59)  BP(mean): 73 (01 Aug 2023 08:00) (64 - 80)  RR: 13 (01 Aug 2023 08:00) (13 - 20)  SpO2: 100% (01 Aug 2023 08:00) (97% - 100%)    Parameters below as of 01 Aug 2023 08:00  Patient On (Oxygen Delivery Method): ventilator, CPAP    O2 Concentration (%): 40    07-31-23 @ 07:01  -  08-01-23 @ 07:00  --------------------------------------------------------  IN: 3074.1 mL / OUT: 5835 mL / NET: -2760.9 mL    08-01-23 @ 07:01  -  08-01-23 @ 11:13  --------------------------------------------------------  IN: 10 mL / OUT: 75 mL / NET: -65 mL      PHYSICAL EXAM:  Constitutional: alert, NAD  Eyes: the sclera and conjunctiva were normal.   ENT: the ears and nose were normal in appearance. intubated   Neck: the appearance of the neck was normal and the neck was supple.   Pulmonary: no respiratory distress and lungs were clear to auscultation bilaterally.   Heart: heart rate was normal and rhythm regular, normal S1 and S2  Vascular:. there was no peripheral edema  Abdomen: normal bowel sounds, soft, non-tender  Neurological: no focal deficits.   Psychiatric: the affect was normal        LABS:                        8.1    3.25  )-----------( 73       ( 01 Aug 2023 05:30 )             23.4     08-01    139  |  106  |  16  ----------------------------<  217<H>  3.2<L>   |  23  |  0.67    Ca    7.2<L>      01 Aug 2023 05:30  Phos  2.6     08-01  Mg     1.7     08-01    TPro  4.2<L>  /  Alb  2.2<L>  /  TBili  1.0  /  DBili  x   /  AST  21  /  ALT  15  /  AlkPhos  53  08-01      Urinalysis Basic - ( 01 Aug 2023 05:30 )    Color: x / Appearance: x / SG: x / pH: x  Gluc: 217 mg/dL / Ketone: x  / Bili: x / Urobili: x   Blood: x / Protein: x / Nitrite: x   Leuk Esterase: x / RBC: x / WBC x   Sq Epi: x / Non Sq Epi: x / Bacteria: x        MICROBIOLOGY:  review    RADIOLOGY & ADDITIONAL STUDIES:  Reviewed

## 2023-08-01 NOTE — PROGRESS NOTE ADULT - PROBLEM SELECTOR PLAN 7
.  Complex medical decision making / symptom management in the setting of advanced and critical illnesses.     Strongly recommend DNR  Discussions of ACP and disease trajectory to continue with daughterRoxanne     Legitimate concern that pt will not return to functional or nutritional statues that would allow him more treatment for GBM, in which case, pending hospital course would recommend discharge with hospice, however family appears strongly averse to symptom dir approach to care    Coping:  well[  ] with difficulty[  ] poor coping[  ] unable to assess[  x]   Support system: strong[  ] adequate[  x] inadequate[  ]    Active Psychosocial Referrals:  SW/CHELSEA[ x ] PT/OT[  ] Hospice[  ]  Ethics[  ]    - For new or uncontrolled symptoms, please call Palliative Care at [phone]. The service is available 24/7 (including nights & weekends) to provide symptom management recommendations over the phone as appropriate  - Will continue to follow for ongoing GOC discussion / titration of palliative regimen. Emotional support provided, questions answered.

## 2023-08-01 NOTE — PROGRESS NOTE ADULT - SUBJECTIVE AND OBJECTIVE BOX
=========================  NSICU ATTENDING PROGRESS NOTE  =========================    69 Y/O male with a past medical history of type 2 diabetes, hyperlipidemia, iron deficiency anemia, tracheal stenosis s/p tracheostomy, glioblastoma s/p resection 1/27/2022, and Avastin treatment 3/2023 presenting with altered mental status and weakness x2 days. Per family, LKN 6pm on 07/05 when he was at baseline. Patient has intermittent expressive aphasia but is getting worse and now is persistent, and has intermittent nausea and vomiting since 07/05. Patient was scheduled to have outpatient brain MRI on 7/25/2023.  Stroke code called in ED. Head CT shows new hyperdensity in frontal parietal - surgical site. MRI ordered to rule out tumor recurrence.   Per daughter at bedside, Entresto and Eliquis were stopped per his PC - cardiologist.  (07 Jul 2023 13:10)    HOSP COURSE:   7/27: multiple BMs o/n. Na stable. Lexapro increased to 10mg daily for worsening depression. Held bowel regimen 2/2 multiple stools.   7/28: ANA LILIA overnight. Na 133 f/u AM labs, pending [place]. Pt has episode of sustained tachycardia, c/o feeling warm, and dizzy while working with Pt. Given 500cc NS bolus, rectal temp was 99F. Family brought in medical marijuana. Pt had rectal temp 100.9, pancultured. Given another 1L bolus and started on maintenance fluids. Rectal temp 102. Started empirically on vanc/cipro/flagyl. Upgraded to telemetry.  7/29: In AM patient had episode of emesis and was unresponsive to sternal rub. Dr. Huggins, neurointensivist at bedside. Repeat BP 60s/40s, tachycardic to 110s. Rapid response called. Levo gtt started. Upgraded to ICU. Intubated for airway protection. Likely septic shock given GNR in blood cx growing K.pneumoniae, Given additional 1L bolus x2. Abx changed to vanc and meropenem, ID following, rec to d/c vanc, c/w meropenem 1mm7fyb, FOBT positive, keppra changed to vimpat 50BID, fentanyl 25mg q4hrs prn for vent dysynchrony, home lexapro decreased to 5mg, decadron decreased to 2q12, sucralfate increased to 1q6 iso GI bleed, pending repeat CBC, CMP, ABG, DIC labs, xray abdomen to r/o obstruction, propofol gtt started for sedation, weaning off precedex gtt, GI consulted rec protonix 40bid iso possible GI bleed, plan for possible EGD, albumin 1.7, 25% albumin 50ml given, s/p 1u PRBC and PLT repeat cbc hgb 8.0 from 6.6 and PLT 88 from 55. Protonix gtt started per GI recs.   7/30: ANA LILIA overnight. Neuro stable. Hemoglobin 6.5, platelets 47 o/n, transfused 1uPRBC, 1u platelets in AM. Repeat Hb 7.1 and platelets 81. CBC repeated and Hb 7.2 and platelets 74. Arnie test negative. 1 set of surveillance blood cultures sent. NS increased to 120cc/hr. Holding off on CT C/A/P per nephrology recs due to concern about IV contrast. Temp 100.8 F, given tylenol. Additional 1u PRBC given in evening for Hb 6.8.     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  Type 2 diabetes mellitus  Hyperlipidemia  Iron deficiency anemia  Nephrolithiasis  Thrombocytopenia  Hyponatremia  BPH (benign prostatic hyperplasia)  S/P craniotomy  H/O tracheostomy    REVIEW OF SYSTEMS: [x] Unable to Assess due to neurologic exam   [ ] All ROS addressed below are non-contributory, except:      ICU Vital Signs Last 24 Hrs  T(C): 37.9 (01 Aug 2023 05:41), Max: 38.2 (01 Aug 2023 00:34)  T(F): 100.2 (01 Aug 2023 05:41), Max: 100.8 (01 Aug 2023 00:34)  HR: 109 (01 Aug 2023 08:00) (107 - 119)  BP: 98/55 (01 Aug 2023 08:00) (91/50 - 113/57)  BP(mean): 73 (01 Aug 2023 08:00) (64 - 80)  ABP: 117/43 (01 Aug 2023 08:00) (95/45 - 145/46)  ABP(mean): 65 (01 Aug 2023 08:00) (62 - 81)  RR: 13 (01 Aug 2023 08:00) (11 - 20)  SpO2: 100% (01 Aug 2023 08:00) (97% - 100%)      07-31-23 @ 07:01  -  08-01-23 @ 07:00  --------------------------------------------------------  IN: 3074.1 mL / OUT: 5835 mL / NET: -2760.9 mL    08-01-23 @ 07:01  -  08-01-23 @ 08:56  --------------------------------------------------------  IN: 10 mL / OUT: 75 mL / NET: -65 mL      PHYSICAL EXAM:  General: No Acute Distress, intubated   HEENT: ETT, OG tube  Neurological: Grimaces to pain, not following commands, eyes opening to noxious stim, tracks, pupils 3mm reactive & midline b/l, WD all extremities to noxious stim, +cough/+gag,   Pulmonary: Diminished at bases  Cardiovascular: S1, S2, Regular Rate and Rhythm   Gastrointestinal: Soft, Nontender, Nondistended   Extremities: No edema     Mode: CPAP with PS, FiO2: 40, PEEP: 5, PS: 10, MAP: 6, PIP: 14  acetaminophen     Tablet .. 650 milliGRAM(s) Oral every 6 hours PRN  atorvastatin 20 milliGRAM(s) Oral at bedtime  cefTRIAXone   IVPB 1000 milliGRAM(s) IV Intermittent every 24 hours  chlorhexidine 0.12% Liquid 15 milliLiter(s) Oral Mucosa every 12 hours  chlorhexidine 2% Cloths 1 Application(s) Topical <User Schedule>  dexAMETHasone  Injectable 2 milliGRAM(s) IV Push every 12 hours  escitalopram 5 milliGRAM(s) Oral daily  fentaNYL    Injectable 25 MICROGram(s) IV Push every 2 hours PRN  fluticasone propionate 50 MICROgram(s)/spray Nasal Spray 1 Spray(s) Both Nostrils two times a day  insulin glargine Injectable (LANTUS) 10 Unit(s) SubCutaneous at bedtime  insulin lispro (ADMELOG) corrective regimen sliding scale   SubCutaneous every 6 hours  lacosamide IVPB 50 milliGRAM(s) IV Intermittent every 12 hours  metroNIDAZOLE  IVPB      metroNIDAZOLE  IVPB 500 milliGRAM(s) IV Intermittent every 8 hours  multivitamin 1 Tablet(s) Oral daily  ondansetron Injectable 4 milliGRAM(s) IV Push every 6 hours PRN  pantoprazole Infusion 8 mG/Hr (10 mL/Hr) IV Continuous <Continuous>  potassium phosphate IVPB 30 milliMole(s) IV Intermittent once  propofol Infusion 19.995 MICROgram(s)/kG/Min (8.71 mL/Hr) IV Continuous <Continuous>  psyllium Powder 1 Packet(s) Oral two times a day  sodium chloride 3 Gram(s) Oral every 6 hours  sucralfate 1 Gram(s) Oral every 6 hours    LABS:                      8.1    3.25  )-----------( 73       ( 01 Aug 2023 05:30 )             23.4     08-01    139  |  106  |  16  ----------------------------<  217<H>  3.2<L>   |  23  |  0.67    Ca    7.2<L>      01 Aug 2023 05:30  Phos  2.6     08-01  Mg     1.7     08-01    TPro  4.2<L>  /  Alb  2.2<L>  /  TBili  1.0  /  DBili  x   /  AST  21  /  ALT  15  /  AlkPhos  53  08-01    LIVER FUNCTIONS - ( 01 Aug 2023 05:30 )  Alb: 2.2 g/dL / Pro: 4.2 g/dL / ALK PHOS: 53 U/L / ALT: 15 U/L / AST: 21 U/L / GGT: x           ABG - ( 01 Aug 2023 05:46 )  pH, Arterial: 7.48  pH, Blood: x     /  pCO2: 31    /  pO2: 160   / HCO3: 23    / Base Excess: 0.4   /  SaO2: 100.0       Culture - Blood (collected 07-31-23 @ 09:02)  Source: .Blood Blood  Preliminary Report (07-31-23 @ 22:00):    No growth at 12 hours      MEDICATIONS:   acetaminophen     Tablet .. 650 milliGRAM(s) Oral every 6 hours PRN  atorvastatin 20 milliGRAM(s) Oral at bedtime  chlorhexidine 0.12% Liquid 15 milliLiter(s) Oral Mucosa every 12 hours  chlorhexidine 2% Cloths 1 Application(s) Topical <User Schedule>  dexAMETHasone  Injectable 2 milliGRAM(s) IV Push every 12 hours  escitalopram 5 milliGRAM(s) Oral daily  fentaNYL    Injectable 25 MICROGram(s) IV Push every 2 hours PRN  fluticasone propionate 50 MICROgram(s)/spray Nasal Spray 1 Spray(s) Both Nostrils two times a day  insulin lispro (ADMELOG) corrective regimen sliding scale   SubCutaneous Before meals and at bedtime  lacosamide IVPB 50 milliGRAM(s) IV Intermittent every 12 hours  marijuana, medical 2 Capsule(s) Oral every 6 hours PRN  meropenem  IVPB 1000 milliGRAM(s) IV Intermittent every 8 hours  multivitamin 1 Tablet(s) Oral daily  norepinephrine Infusion 0.05 MICROgram(s)/kG/Min (3.4 mL/Hr) IV Continuous <Continuous>  ondansetron Injectable 4 milliGRAM(s) IV Push every 6 hours PRN  pantoprazole Infusion 8 mG/Hr (10 mL/Hr) IV Continuous <Continuous>  propofol Infusion 20 MICROgram(s)/kG/Min (8.71 mL/Hr) IV Continuous <Continuous>  psyllium Powder 1 Packet(s) Oral two times a day  sodium chloride 3 Gram(s) Oral every 6 hours  sodium chloride 0.9%. 1000 milliLiter(s) (120 mL/Hr) IV Continuous <Continuous>  sucralfate 1 Gram(s) Oral every 6 hours    LABS:                     7.0    3.34  )-----------( 117      ( 31 Jul 2023 04:13 )             21.0     07-31    136  |  110<H>  |  15  ----------------------------<  231<H>  3.7   |  18<L>  |  0.56    Ca    7.2<L>      31 Jul 2023 04:13  Phos  2.9     07-31  Mg     2.0     07-31    TPro  3.7<L>  /  Alb  1.8<L>  /  TBili  3.0<H>  /  DBili  1.5<H>  /  AST  31  /  ALT  22  /  AlkPhos  57  07-30    LIVER FUNCTIONS - ( 30 Jul 2023 01:16 )  Alb: 1.8 g/dL / Pro: 3.7 g/dL / ALK PHOS: 57 U/L / ALT: 22 U/L / AST: 31 U/L / GGT: x           ABG - ( 30 Jul 2023 09:33 )  pH, Arterial: 7.44  pH, Blood: x     /  pCO2: 25    /  pO2: 190   / HCO3: 17    / Base Excess: -5.4  /  SaO2: 99.9        Culture - Blood (collected 07-30-23 @ 12:52)  Source: .Blood Blood  Preliminary Report (07-31-23 @ 02:00):    No growth at 12 hours

## 2023-08-01 NOTE — PROCEDURAL SAFETY CHECKLIST WITH OR WITHOUT SEDATION - NSPOSTCOMMENTFT_GEN_ALL_CORE
EGD completed at bedside, pt remained intubated VC/AC, with propofol gtt infusing, levophed on standby but not used. Vitals WDL, seen per flow sheet. Pt resting comfortably, will continue to monitor

## 2023-08-02 NOTE — CHART NOTE - NSCHARTNOTEFT_GEN_A_CORE
Pt tolerating CPAP. Campuzano removed, failed TOV, and straight catheterized. Trickle feeds started 10cc/hr. Changed protonix gtt transitioned to 40mg IV BID per GI. ENT consulted for trach planning. Neuro exam stable

## 2023-08-02 NOTE — PROGRESS NOTE ADULT - ASSESSMENT
68M h/o GBM (s/p resection 1/2022, Avastin 3/2023), tracheal stenosis (s/p prior trach) p/w symptomatic hyponatremia with AMS (improved), ccb shock, K. pneumo bacteremia, and c/f GI bleed. EGD 8/1 showed severe circumferential esophagitis and small gastric ulcer (possible from NG trauma).    Based on the EGD findings, his anemia and emesis is most likely due to esophagitis exacerbated in setting of acute medical illness.    Recommendations:  - Can switch to PPI BID  - Carafate Suspension 1g po qid x 4 weeks  - Consider repeat EGD in 6-8 weeks pending overall prognosis and goals of care    We will sign off, but please page if any further questions arise. Please see attending addendum for final recommendations.     Vaughn (Terri Leija MD  Gastroenterology Fellow  Pager (M-F 7a-5p): [phone]  Pager (after hours): Please call  for on-call fellow    	     68M h/o GBM (s/p resection 1/2022, Avastin 3/2023), tracheal stenosis (s/p prior trach) p/w symptomatic hyponatremia with AMS (improved), ccb shock, K. pneumo bacteremia, and c/f GI bleed. EGD 8/1 showed severe circumferential esophagitis and small gastric ulcer (possible from NG trauma).    Based on the EGD findings, his anemia and emesis is most likely due to esophagitis exacerbated in setting of acute medical illness. He has had no melena/BRBPR/hematemesis and Hgb is stable thus it is unlikely that GI bleeding is contributing to his shock.    Recommendations:  - Can switch to PPI BID  - Carafate Suspension 1g po qid x 4 weeks  - Consider repeat EGD in 6-8 weeks pending overall prognosis and goals of care    We will sign off, but please page if any further questions arise. Please see attending addendum for final recommendations.     Vaughn (Terri Leija MD  Gastroenterology Fellow  Pager (M-F 7a-5p): [phone]  Pager (after hours): Please call  for on-call fellow    	     68M h/o GBM (s/p resection 1/2022, Avastin 3/2023), tracheal stenosis (s/p prior trach) p/w symptomatic hyponatremia with AMS (improved), ccb shock, K. pneumo bacteremia, and c/f GI bleed. EGD 8/1 showed severe circumferential esophagitis and small gastric ulcer (possible from NG trauma).    Based on the EGD findings, his anemia and emesis is most likely due to esophagitis exacerbated in setting of acute medical illness. He has had no melena/BRBPR/hematemesis and Hgb is stable thus it is unlikely that GI bleeding is contributing to his shock.    Recommendations:  - Can switch to PPI BID  - Carafate Suspension 1g po qid x 4 weeks  - Consider repeat EGD in 6-8 weeks pending overall prognosis and goals of care    We will sign off, but please page if any further questions arise. Patient discussed with but not seen by attending.    Vaughn (Gennaro) MD [facility]  Gastroenterology Fellow  Pager (M-F 7a-5p): [phone]  Pager (after hours): Please call  for on-call fellow

## 2023-08-02 NOTE — PROGRESS NOTE ADULT - SUBJECTIVE AND OBJECTIVE BOX
=========================  NSICU ATTENDING PROGRESS NOTE  =========================    69 Y/O male with a past medical history of type 2 diabetes, hyperlipidemia, iron deficiency anemia, tracheal stenosis s/p tracheostomy, glioblastoma s/p resection 1/27/2022, and Avastin treatment 3/2023 presenting with altered mental status and weakness x2 days. Per family, LKN 6pm on 07/05 when he was at baseline. Patient has intermittent expressive aphasia but is getting worse and now is persistent, and has intermittent nausea and vomiting since 07/05. Patient was scheduled to have outpatient brain MRI on 7/25/2023.  Stroke code called in ED. Head CT shows new hyperdensity in frontal parietal - surgical site. MRI ordered to rule out tumor recurrence.   Per daughter at bedside, Entresto and Eliquis were stopped per his PC - cardiologist.  (07 Jul 2023 13:10)    HOSP COURSE:   7/27: multiple BMs o/n. Na stable. Lexapro increased to 10mg daily for worsening depression. Held bowel regimen 2/2 multiple stools.   7/28: ANA LILIA overnight. Na 133 f/u AM labs, pending [place]. Pt has episode of sustained tachycardia, c/o feeling warm, and dizzy while working with Pt. Given 500cc NS bolus, rectal temp was 99F. Family brought in medical marijuana. Pt had rectal temp 100.9, pancultured. Given another 1L bolus and started on maintenance fluids. Rectal temp 102. Started empirically on vanc/cipro/flagyl. Upgraded to telemetry.  7/29: In AM patient had episode of emesis and was unresponsive to sternal rub. Dr. Huggins, neurointensivist at bedside. Repeat BP 60s/40s, tachycardic to 110s. Rapid response called. Levo gtt started. Upgraded to ICU. Intubated for airway protection. Likely septic shock given GNR in blood cx growing K.pneumoniae, Given additional 1L bolus x2. Abx changed to vanc and meropenem, ID following, rec to d/c vanc, c/w meropenem [id number], FOBT positive, keppra changed to vimpat 50BID, fentanyl 25mg q4hrs prn for vent dysynchrony, home lexapro decreased to 5mg, decadron decreased to 2q12, sucralfate increased to 1q6 iso GI bleed, pending repeat CBC, CMP, ABG, DIC labs, xray abdomen to r/o obstruction, propofol gtt started for sedation, weaning off precedex gtt, GI consulted rec protonix 40bid iso possible GI bleed, plan for possible EGD, albumin 1.7, 25% albumin 50ml given, s/p 1u PRBC and PLT repeat cbc hgb 8.0 from 6.6 and PLT 88 from 55. Protonix gtt started per GI recs.   7/30: ANA LILIA overnight. Neuro stable. Hemoglobin 6.5, platelets 47 o/n, transfused 1uPRBC, 1u platelets in AM. Repeat Hb 7.1 and platelets 81. CBC repeated and Hb 7.2 and platelets 74. Arnie test negative. 1 set of surveillance blood cultures sent. NS increased to 120cc/hr. Holding off on CT C/A/P per nephrology recs due to concern about IV contrast. Temp 100.8 F, given tylenol. Additional 1u PRBC given in evening for Hb 6.8.     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  Type 2 diabetes mellitus  Hyperlipidemia  Iron deficiency anemia  Nephrolithiasis  Thrombocytopenia  Hyponatremia  BPH (benign prostatic hyperplasia)  S/P craniotomy  H/O tracheostomy    REVIEW OF SYSTEMS: [x] Unable to Assess due to neurologic exam   [ ] All ROS addressed below are non-contributory, except:      ICU Vital Signs Last 24 Hrs  T(C): 37.9 (01 Aug 2023 05:41), Max: 38.2 (01 Aug 2023 00:34)  T(F): 100.2 (01 Aug 2023 05:41), Max: 100.8 (01 Aug 2023 00:34)  HR: 109 (01 Aug 2023 08:00) (107 - 119)  BP: 98/55 (01 Aug 2023 08:00) (91/50 - 113/57)  BP(mean): 73 (01 Aug 2023 08:00) (64 - 80)  ABP: 117/43 (01 Aug 2023 08:00) (95/45 - 145/46)  ABP(mean): 65 (01 Aug 2023 08:00) (62 - 81)  RR: 13 (01 Aug 2023 08:00) (11 - 20)  SpO2: 100% (01 Aug 2023 08:00) (97% - 100%)      07-31-23 @ 07:01  -  08-01-23 @ 07:00  --------------------------------------------------------  IN: 3074.1 mL / OUT: 5835 mL / NET: -2760.9 mL    08-01-23 @ 07:01  -  08-01-23 @ 08:56  --------------------------------------------------------  IN: 10 mL / OUT: 75 mL / NET: -65 mL      PHYSICAL EXAM:  General: No Acute Distress, intubated   HEENT: ETT, OG tube  Neurological: Grimaces to pain, not following commands, eyes opening to noxious stim, tracks, pupils 3mm reactive & midline b/l, WD all extremities to noxious stim, +cough/+gag,   Pulmonary: Diminished at bases  Cardiovascular: S1, S2, Regular Rate and Rhythm   Gastrointestinal: Soft, Nontender, Nondistended   Extremities: No edema     Mode: CPAP with PS, FiO2: 40, PEEP: 5, PS: 10, MAP: 6, PIP: 14  acetaminophen     Tablet .. 650 milliGRAM(s) Oral every 6 hours PRN  atorvastatin 20 milliGRAM(s) Oral at bedtime  cefTRIAXone   IVPB 1000 milliGRAM(s) IV Intermittent every 24 hours  chlorhexidine 0.12% Liquid 15 milliLiter(s) Oral Mucosa every 12 hours  chlorhexidine 2% Cloths 1 Application(s) Topical <User Schedule>  dexAMETHasone  Injectable 2 milliGRAM(s) IV Push every 12 hours  escitalopram 5 milliGRAM(s) Oral daily  fentaNYL    Injectable 25 MICROGram(s) IV Push every 2 hours PRN  fluticasone propionate 50 MICROgram(s)/spray Nasal Spray 1 Spray(s) Both Nostrils two times a day  insulin glargine Injectable (LANTUS) 10 Unit(s) SubCutaneous at bedtime  insulin lispro (ADMELOG) corrective regimen sliding scale   SubCutaneous every 6 hours  lacosamide IVPB 50 milliGRAM(s) IV Intermittent every 12 hours  metroNIDAZOLE  IVPB      metroNIDAZOLE  IVPB 500 milliGRAM(s) IV Intermittent every 8 hours  multivitamin 1 Tablet(s) Oral daily  ondansetron Injectable 4 milliGRAM(s) IV Push every 6 hours PRN  pantoprazole Infusion 8 mG/Hr (10 mL/Hr) IV Continuous <Continuous>  potassium phosphate IVPB 30 milliMole(s) IV Intermittent once  propofol Infusion 19.995 MICROgram(s)/kG/Min (8.71 mL/Hr) IV Continuous <Continuous>  psyllium Powder 1 Packet(s) Oral two times a day  sodium chloride 3 Gram(s) Oral every 6 hours  sucralfate 1 Gram(s) Oral every 6 hours    LABS:                      8.1    3.25  )-----------( 73       ( 01 Aug 2023 05:30 )             23.4     08-01    139  |  106  |  16  ----------------------------<  217<H>  3.2<L>   |  23  |  0.67    Ca    7.2<L>      01 Aug 2023 05:30  Phos  2.6     08-01  Mg     1.7     08-01    TPro  4.2<L>  /  Alb  2.2<L>  /  TBili  1.0  /  DBili  x   /  AST  21  /  ALT  15  /  AlkPhos  53  08-01    LIVER FUNCTIONS - ( 01 Aug 2023 05:30 )  Alb: 2.2 g/dL / Pro: 4.2 g/dL / ALK PHOS: 53 U/L / ALT: 15 U/L / AST: 21 U/L / GGT: x           ABG - ( 01 Aug 2023 05:46 )  pH, Arterial: 7.48  pH, Blood: x     /  pCO2: 31    /  pO2: 160   / HCO3: 23    / Base Excess: 0.4   /  SaO2: 100.0       Culture - Blood (collected 07-31-23 @ 09:02)  Source: .Blood Blood  Preliminary Report (07-31-23 @ 22:00):    No growth at 12 hours      MEDICATIONS:   acetaminophen     Tablet .. 650 milliGRAM(s) Oral every 6 hours PRN  atorvastatin 20 milliGRAM(s) Oral at bedtime  chlorhexidine 0.12% Liquid 15 milliLiter(s) Oral Mucosa every 12 hours  chlorhexidine 2% Cloths 1 Application(s) Topical <User Schedule>  dexAMETHasone  Injectable 2 milliGRAM(s) IV Push every 12 hours  escitalopram 5 milliGRAM(s) Oral daily  fentaNYL    Injectable 25 MICROGram(s) IV Push every 2 hours PRN  fluticasone propionate 50 MICROgram(s)/spray Nasal Spray 1 Spray(s) Both Nostrils two times a day  insulin lispro (ADMELOG) corrective regimen sliding scale   SubCutaneous Before meals and at bedtime  lacosamide IVPB 50 milliGRAM(s) IV Intermittent every 12 hours  marijuana, medical 2 Capsule(s) Oral every 6 hours PRN  meropenem  IVPB 1000 milliGRAM(s) IV Intermittent every 8 hours  multivitamin 1 Tablet(s) Oral daily  norepinephrine Infusion 0.05 MICROgram(s)/kG/Min (3.4 mL/Hr) IV Continuous <Continuous>  ondansetron Injectable 4 milliGRAM(s) IV Push every 6 hours PRN  pantoprazole Infusion 8 mG/Hr (10 mL/Hr) IV Continuous <Continuous>  propofol Infusion 20 MICROgram(s)/kG/Min (8.71 mL/Hr) IV Continuous <Continuous>  psyllium Powder 1 Packet(s) Oral two times a day  sodium chloride 3 Gram(s) Oral every 6 hours  sodium chloride 0.9%. 1000 milliLiter(s) (120 mL/Hr) IV Continuous <Continuous>  sucralfate 1 Gram(s) Oral every 6 hours    LABS:                     7.0    3.34  )-----------( 117      ( 31 Jul 2023 04:13 )             21.0     07-31    136  |  110<H>  |  15  ----------------------------<  231<H>  3.7   |  18<L>  |  0.56    Ca    7.2<L>      31 Jul 2023 04:13  Phos  2.9     07-31  Mg     2.0     07-31    TPro  3.7<L>  /  Alb  1.8<L>  /  TBili  3.0<H>  /  DBili  1.5<H>  /  AST  31  /  ALT  22  /  AlkPhos  57  07-30    LIVER FUNCTIONS - ( 30 Jul 2023 01:16 )  Alb: 1.8 g/dL / Pro: 3.7 g/dL / ALK PHOS: 57 U/L / ALT: 22 U/L / AST: 31 U/L / GGT: x           ABG - ( 30 Jul 2023 09:33 )  pH, Arterial: 7.44  pH, Blood: x     /  pCO2: 25    /  pO2: 190   / HCO3: 17    / Base Excess: -5.4  /  SaO2: 99.9        Culture - Blood (collected 07-30-23 @ 12:52)  Source: .Blood Blood  Preliminary Report (07-31-23 @ 02:00):    No growth at 12 hours

## 2023-08-02 NOTE — PROGRESS NOTE ADULT - ASSESSMENT
68m hx GBM s/p resection, recent chemo, type 2 DM, admit septic shock 2/2 klebsiella bacteremia; s/p EGD - severe erosive esophagitis     -s/p EGD today; c/w Protonix drip; Carafate suspension; goal repeat EGD in 6-8 weeks   -NGT placed w/ direct visualization via EGD; NPO w/ meds   h/h stable thrombocytopenia stable  -s/p lasix today, monitor urine output   -neuro check q4  -sedation w/ precedex RASS goal -1 to -2   -hypotensive in setting of blood loss & sepsis; c/w levophed   -on mechanical vent; CPAP trials as tolerated  -c/w ceftriaxone & flagyl

## 2023-08-02 NOTE — PROGRESS NOTE ADULT - SUBJECTIVE AND OBJECTIVE BOX
INTERVAL HISTORY: HPI:  Osiel Norwood is a 69YO male with a past medical history of type 2 diabetes, hyperlipidemia, iron deficiency anemia, tracheal stenosis s/p tracheostomy (which was closed by ENT 7 days ago); glioblastoma s/p resection 1/27/2022, and Avastin treatment 3/2023 presenting with altered mental status and weakness x2 days. Per family, they last saw him last night around 6pm when he was at baseline. Patient has intermittent expressive aphasia but is getting worse and now is persistent, and has intermittent nausea and vomiting since last night. Patient was supposed to receive an outpatient brain MRI on 7/25/2023.  Stroke code performed in  is negative for CVA. Head CT shows new hyperdensity in frontal parietal - surgical site. MRI is ordered to rule out tumor recurrence.   Per daughter at bedside, Entresto and Eliquis were stopped per his PC - cardiologist.     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  Type 2 diabetes mellitus  Hyperlipidemia  Iron deficiency anemia  Nephrolithiasis  Thrombocytopenia  Hyponatremia  BPH (benign prostatic hyperplasia)  S/P craniotomy  H/O tracheostomy      REVIEW OF SYSTEMS: [x] Unable to Assess due to neurologic exam   [ ] All ROS addressed below are non-contributory, except:  Neuro: [ ] Headache [ ] Back pain [ ] Numbness [ ] Weakness [ ] Ataxia [ ] Dizziness [ ] Aphasia [ ] Dysarthria [ ] Visual disturbance  Resp: [ ] Shortness of breath/dyspnea, [ ] Orthopnea [ ] Cough  CV: [ ] Chest pain [ ] Palpitation [ ] Lightheadedness [ ] Syncope  Renal: [ ] Thirst [ ] Edema  GI: [ ] Nausea [ ] Emesis [ ] Abdominal pain [ ] Constipation [ ] Diarrhea  Hem: [ ] Hematemesis [ ] bright red blood per rectum  ID: [ ] Fever [ ] Chills [ ] Dysuria  ENT: [ ] Rhinorrhea    PHYSICAL EXAM:    General: No Acute Distress, intubated   Neurological: grimace to pain, not following commands, eyes not open to nox, pupils 3mm reactive & midline b/l, WD all extremities to nos, +cough/+gag,   Pulmonary: Clear to Auscultation, No Rales, No Rhonchi, No Wheezes   Cardiovascular: S1, S2, Regular Rate and Rhythm   Gastrointestinal: Soft, Nontender, Nondistended   Extremities: No edema     ICU Vital Signs Last 24 Hrs  T(C): 37.7 (03 Aug 2023 01:08), Max: 38.7 (02 Aug 2023 17:43)  T(F): 99.9 (03 Aug 2023 01:08), Max: 101.7 (02 Aug 2023 17:43)  HR: 84 (03 Aug 2023 03:10) (83 - 113)  BP: 111/63 (03 Aug 2023 03:00) (99/54 - 119/70)  BP(mean): 82 (03 Aug 2023 03:00) (69 - 90)  ABP: 133/60 (03 Aug 2023 03:00) (94/45 - 133/60)  ABP(mean): 84 (03 Aug 2023 03:00) (61 - 84)  RR: 16 (03 Aug 2023 02:00) (14 - 20)  SpO2: 100% (03 Aug 2023 03:00) (98% - 100%)      08-01-23 @ 07:01  -  08-02-23 @ 07:00  --------------------------------------------------------  IN: 1506 mL / OUT: 2500 mL / NET: -994 mL    08-02-23 @ 07:01  -  08-03-23 @ 05:22  --------------------------------------------------------  IN: 1028.8 mL / OUT: 1950 mL / NET: -921.2 mL        Mode: CPAP with PS, FiO2: 40, PEEP: 5, PS: 10, MAP: 7.6, PIP: 18    acetaminophen     Tablet .. 650 milliGRAM(s) Oral every 6 hours PRN  atorvastatin 20 milliGRAM(s) Oral at bedtime  cefTRIAXone   IVPB 1000 milliGRAM(s) IV Intermittent every 24 hours  chlorhexidine 0.12% Liquid 15 milliLiter(s) Oral Mucosa every 12 hours  chlorhexidine 2% Cloths 1 Application(s) Topical <User Schedule>  dexAMETHasone  Injectable 2 milliGRAM(s) IV Push every 12 hours  dexMEDEtomidine Infusion 0.2 MICROgram(s)/kG/Hr (3.63 mL/Hr) IV Continuous <Continuous>  escitalopram 5 milliGRAM(s) Oral daily  fentaNYL    Injectable 25 MICROGram(s) IV Push every 2 hours PRN  fluticasone propionate 50 MICROgram(s)/spray Nasal Spray 1 Spray(s) Both Nostrils two times a day  insulin glargine Injectable (LANTUS) 14 Unit(s) SubCutaneous at bedtime  insulin lispro (ADMELOG) corrective regimen sliding scale   SubCutaneous every 6 hours  lacosamide IVPB 50 milliGRAM(s) IV Intermittent every 12 hours  metroNIDAZOLE  IVPB      metroNIDAZOLE  IVPB 500 milliGRAM(s) IV Intermittent every 8 hours  multivitamin 1 Tablet(s) Oral daily  norepinephrine Infusion 0.05 MICROgram(s)/kG/Min (6.81 mL/Hr) IV Continuous <Continuous>  ondansetron Injectable 4 milliGRAM(s) IV Push every 6 hours PRN  pantoprazole  Injectable 40 milliGRAM(s) IV Push every 12 hours  sodium chloride 3 Gram(s) Oral every 6 hours  sucralfate suspension 1 Gram(s) Oral every 6 hours      LABS:  Na: 141 (08-02 @ 04:44), 139 (08-01 @ 05:30)  K: 3.4 (08-02 @ 04:44), 3.2 (08-01 @ 05:30)  Cl: 112 (08-02 @ 04:44), 106 (08-01 @ 05:30)  CO2: 25 (08-02 @ 04:44), 23 (08-01 @ 05:30)  BUN: 16 (08-02 @ 04:44), 16 (08-01 @ 05:30)  Cr: 0.56 (08-02 @ 04:44), 0.67 (08-01 @ 05:30)  Glu: 152(08-02 @ 04:44), 217(08-01 @ 05:30)    Hgb: 7.1 (08-02 @ 23:15), 7.6 (08-02 @ 16:39), 7.7 (08-02 @ 04:44), 8.1 (08-01 @ 05:30), 8.0 (07-31 @ 23:56), 8.2 (07-31 @ 09:21)  Hct: 21.7 (08-02 @ 23:15), 22.6 (08-02 @ 16:39), 23.1 (08-02 @ 04:44), 23.4 (08-01 @ 05:30), 23.1 (07-31 @ 23:56), 24.1 (07-31 @ 09:21)  WBC: 2.92 (08-02 @ 23:15), 3.20 (08-02 @ 16:39), 3.43 (08-02 @ 04:44), 3.25 (08-01 @ 05:30), 3.09 (07-31 @ 23:56), 2.65 (07-31 @ 09:21)  Plt: 69 (08-02 @ 23:15), 80 (08-02 @ 16:39), 78 (08-02 @ 04:44), 73 (08-01 @ 05:30), 75 (07-31 @ 23:56), 88 (07-31 @ 09:21)    LIVER FUNCTIONS - ( 01 Aug 2023 05:30 )  Alb: 2.2 g/dL / Pro: 4.2 g/dL / ALK PHOS: 53 U/L / ALT: 15 U/L / AST: 21 U/L / GGT: x           ABG - ( 01 Aug 2023 05:46 )  pH, Arterial: 7.48  pH, Blood: x     /  pCO2: 31    /  pO2: 160   / HCO3: 23    / Base Excess: 0.4   /  SaO2: 100.0

## 2023-08-02 NOTE — PROGRESS NOTE ADULT - ASSESSMENT
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  He was medically managed.  Course c/b septic shock due to K.pneumo bacteremia, patient has +FOBT, Hgb drop from 9 to 6.7, and billous vomiting, suspect due to gut translocation in setting of GIB.  CT C/A/P with ? infectious vs inflammatory colitis. Patient not having diarrhea. Kleb pneumo sensitive to CTX on susceptibilities. EGD 8/1 showed erosive esophagitis- per GI, anemia/emesis likely 2/2 exacerbation of esophagitis. Sputum cx growing Kleb pneumo    Suggest:  -F/u Bcxs 7/30 and 7/31  -cont CTX 1g IV q24h (coverage of Kleb pneumo)  -cont Flagyl 500 mg IV Q8h (to add coverage of other anaerobes)   -anticipating 7d course from negative bcxs     Team 2 will follow you.    Case d/w primary team.  Final recommendation pending attending note.    Kelsey Jolley, Infectious Diseases PA  Please reach out for any questions 9 am-5pm. For evenings and weekends, please call the ID physician on call.  Work cell: [phone]    68M h/o GBM s/p resection 1/27/2022 and Avastin 3/2023, T2DM, HLD, trachal stenosis s/p tracheostomy which was closed p/w AMS, found to have severe hyponatremia.  He was medically managed.  Course c/b septic shock due to K.pneumo bacteremia, patient has +FOBT, Hgb drop from 9 to 6.7, and billous vomiting, suspect due to gut translocation in setting of GIB.  CT C/A/P with ? infectious vs inflammatory colitis. Patient not having diarrhea. Kleb pneumo sensitive to CTX on susceptibilities. EGD 8/1 showed erosive esophagitis- per GI, anemia/emesis likely 2/2 exacerbation of esophagitis. Sputum cx growing Kleb pneumo. Temp to 100.2 this AM- this is likely post-procedural     Suggest:  -F/u Bcxs 7/30 and 7/31  -cont CTX 1g IV q24h (coverage of Kleb pneumo)  -cont Flagyl 500 mg IV Q8h (to add coverage of other anaerobes)   -anticipating 7d course from negative bcxs     Team 2 will follow you.    Case d/w primary team.  Final recommendation pending attending note.    Kelsey Jolley, Infectious Diseases PA  Please reach out for any questions 9 am-5pm. For evenings and weekends, please call the ID physician on call.  Work cell: [phone]

## 2023-08-02 NOTE — PROGRESS NOTE ADULT - ASSESSMENT
ASSESSMENT:   67 y/o M with septic shock secondary to Klebsiella bacteremia   GBM s/p resection, recent chemotherapy  History of Takotsubo cardiomyopathy  Chronic SIADH  Type 2 DM    NEURO:  Neurochecks Q4  Analgesia- fentanyl prn  Sedation: DC. Precedex prn RASS 0- neg 1  Seizure history: Continue vimpat  Cerebral edema: On dexamethasone 2mg Q12  Activity: Bedrest for now. PT/OT evaluation when stable   Palliative care following    PULMONARY: Currently requiring mechanical ventilation for airway protection   Vent settings 14/400/40/5  CXR, ABG  VAP bundle  Will need trach; ENT     CARDIOVASCULAR: Hypotension in setting of likely septic shock and acute blood loss anemia 2/2 suspected GIB  MAP goal 65-75. On low dose levophed  TTE: EF 60-65%. No WMA, no vegetation  POCUS prn  S/P Lasix 20mg x 1    GASTROENTEROLOGY: Suspected GI bleed, fecal occult positive  NPO, start trickle feeds 8/2.  GI consulted for endoscopy evaluation- 8/1 scope showed esophagitis and non-bleeding ulcer  GI ppx: On protonix gtt-> change to bid. Continue sucralfate  CT chest/ abd/ pelvis ? small ulcer at first portion of duodenum  Will need PEG    RENAL/: Dark urine; ?myoglobin vs hematuria vs bilirubinemia. Chronic SIADH  Monitor BMP daily  Attempt DC Campuzano 8/2  Na goal 135-145; replete lytes   Lactate 8/1: wnl  Urology following    ENDOCRINE: Diabetes Mellitus  A1c- 5.7  ISS while NPO  Lantus 14u qhs  Cortisol level wnl    HEME/ONC: Pancytopenia likely chemo-induced, JAGDISH.  Concern for GIB: Trend H/H and plt. Hb goal >7.0, plt goal >80  Repeat CBC 8/2  DVT ppx: will hold chemical DVT ppx in setting of low platelets/ bleed  B/L SCDs  Heme following    INFECTIOUS: Klebsiella bacteremia ?source- possible translocation  S/P meropenem-> CTX and flagyl (?duration)  ID following  Repeat blood cultures 7/31 and daily. NGTD from 7/30 and 7/31  Monitor for fevers     MISC:  Palliative care consult    SOCIAL/FAMILY:  [] awaiting [x] updated at bedside [] family meeting    CODE STATUS:  [x] Full Code [] DNR [] DNI [] Palliative/Comfort Care    DISPOSITION:  [x] ICU [] Stroke Unit [] Floor [] EMU [] RCU [] PCU    Time spent: 60 critical care minutes

## 2023-08-02 NOTE — GOALS OF CARE CONVERSATION - ADVANCED CARE PLANNING - CONVERSATION DETAILS
Pt unable to participate in discussion, intubated and without capacity. Extensive discussion held at bedside today w pts daughter/HCP discussing diagnosis, trajectory, ACP, treatment preferences/GOC.     Daughter and family demonstrate explicit understanding of GBM disease trajectory and expect that pt will eventually die from his GBM or complications arising from it. Reviewed pts baseline prior to admission-- he was ambulatory, appreciated his independence with ADLs, and found adan in his meaningful interactions with family. Reviewed pts prolonged hospital course and complications including septic shock 2/2 bacteremia requiring intubation, GIB, and severe protein calorie malnutrition. Expressed worry that he may not return to functional or nutritional statues that would allow him to be offered further treatment- family is aware of this possibility. They share that when it has been told to them that not further DMTx will be offered, family's priority is to respect patient's wishes to return to [place] to die peacefully with dignity, at his brother's home with hospice support.     Reviewed CPR, risks/benefits and complications; poor outcomes were emphasized in light of pts frailty. Daughter expressed understanding and for now wants pt to remain full code understanding likelihood of death and that this decision would prevent pt from fulfilling his wishes to die peacefully with hospice, let alone return to the DR for EOL care. It was emphasized that DNR does not mean withdrawal of care, hospice, or comfort care and that by electing DNR would not change current medical plan of care.     They are still hopeful and expect that pt will recover from critical illness, improve enough to allow transfer to (acute) rehab, and want to give pt "a chance to get stronger" so he may receive more treatment.     Extensive time spent exploring pts values/goals, health care options that are available for EOL care, and answering questions. Support provided.       In addition to the E/M visit, an advance care planning meeting was performed. Start time: 100; End time:146 ; Total time: 46min. A face to face meeting to discuss advance care planning was held today regarding:  Osiel Norwood   Primary decision maker:  Patient is unable to participate in decision making;  Alternate/surrogate:   Jodie . Discussed advance directives including, but not limited to, healthcare proxy and code status, as well as disease trajectory, patient's values/goals, and health care options that are available for end of life care. Decision regarding code status:  FULL CODE; Documentation completed today:  GOC note Pt unable to participate in discussion, intubated and without capacity. Extensive discussion held at bedside today w pts daughter/HCP discussing diagnosis, trajectory, ACP, treatment preferences/GOC.     Daughter and family demonstrate explicit understanding of GBM disease trajectory and expect that pt will eventually die from his GBM or complications arising from it. Reviewed pts baseline prior to admission-- he was ambulatory, appreciated his independence with ADLs, and found adan in his meaningful interactions with family. Reviewed pts prolonged hospital course and complications including septic shock 2/2 bacteremia requiring intubation, GIB, and severe protein calorie malnutrition. Expressed worry that he may not return to functional or nutritional statues that would allow him to be offered further treatment- family is aware of this possibility. They share that when it has been told to them that no further DMTx will be offered, family's priority is to respect patient's wishes to return to [place] to die peacefully with dignity, at his brother's home with hospice support.     Reviewed CPR, risks/benefits and complications; poor outcomes were emphasized in light of pts frailty. Daughter expressed understanding and for now wants pt to remain full code understanding likelihood of death and that this decision would prevent pt from fulfilling his wishes to die peacefully with hospice, let alone return to the DR for EOL care. It was emphasized that DNR does not mean withdrawal of care, hospice, or comfort care and that by electing DNR would not change current medical plan of care.     They are still hopeful and expect that pt will recover from critical illness, improve enough to allow transfer to (acute) rehab, and want to give pt "a chance to get stronger" so he may receive more treatment. They also asked questions about hospice, which were addressed.     Extensive time spent exploring pts values/goals, health care options that are available for EOL care, and answering questions. Support provided.       In addition to the E/M visit, an advance care planning meeting was performed. Start time: 100; End time:146 ; Total time: 46min. A face to face meeting to discuss advance care planning was held today regarding:  Osiel Norwood   Primary decision maker:  Patient is unable to participate in decision making;  Alternate/surrogate:   Roxanne and Addie . Discussed advance directives including, but not limited to, healthcare proxy and code status. Decision regarding code status:  FULL CODE; Documentation completed today:  GOC note Pt unable to participate in discussion, intubated and without capacity. Extensive discussion held at bedside today w pts daughter/HCP discussing diagnosis, trajectory, ACP, treatment preferences /GOC.     Daughter and family demonstrate explicit understanding of GBM disease trajectory and expect that pt will eventually die from his GBM or complications arising from it. Reviewed pts baseline prior to admission-- he was ambulatory, appreciated his independence with ADLs, and found adan in his meaningful interactions with family. Reviewed pts prolonged hospital course and complications including septic shock 2/2 bacteremia requiring intubation, GIB, and severe protein calorie malnutrition. Expressed worry that he may not return to functional or nutritional statues that would allow him to be offered further treatment- family is aware of this possibility. They share that when it has been told to them that no further DMTx will be offered, family's priority is to respect patient's wishes to return to [place] to die peacefully with dignity, at his brother's home with hospice support.     Reviewed CPR, risks/benefits and complications; poor outcomes were emphasized in light of pts frailty. Daughter expressed understanding and for now wants pt to remain full code understanding likelihood of death and that this decision would prevent pt from fulfilling his wishes to die peacefully with hospice, let alone return to the DR for EOL care. It was emphasized that DNR does not mean withdrawal of care, hospice, or comfort care and that by electing DNR would not change current medical plan of care.     They are still hopeful and expect that pt will recover from critical illness, improve enough to allow transfer to (acute) rehab, and want to give pt "a chance to get stronger" so he may receive more treatment. They also asked questions about hospice, which were addressed. However overall, goc remain aggressive and include trach/peg placement if necessary.     Extensive time spent exploring pts values/goals, health care options that are available for EOL care, and answering questions. Support provided.       In addition to the E/M visit, an advance care planning meeting was performed. Start time: 100; End time:146 ; Total time: 46min. A face to face meeting to discuss advance care planning was held today regarding:  Osiel Norwood   Primary decision maker:  Patient is unable to participate in decision making;  Alternate/surrogate:   oRxanne and Addie . Discussed advance directives including, but not limited to, healthcare proxy and code status. Decision regarding code status:  FULL CODE; Documentation completed today:  GOC note

## 2023-08-02 NOTE — PROGRESS NOTE ADULT - SUBJECTIVE AND OBJECTIVE BOX
GASTROENTEROLOGY PROGRESS NOTE    INTERVAL/SUBJECTIVE:  - Overnight small amount of red blood was suctioned from mouth but otherwise no GI bleeding. Hgb stable this AM.  - Remains on pressors  - Unable to obtain history    Allergies  amoxicillin (Rash)  ure-Na (Rash; Fever; Hypotension)    Intolerances    MEDICATIONS:  MEDICATIONS  (STANDING):  atorvastatin 20 milliGRAM(s) Oral at bedtime  cefTRIAXone   IVPB 1000 milliGRAM(s) IV Intermittent every 24 hours  chlorhexidine 0.12% Liquid 15 milliLiter(s) Oral Mucosa every 12 hours  chlorhexidine 2% Cloths 1 Application(s) Topical <User Schedule>  dexAMETHasone  Injectable 2 milliGRAM(s) IV Push every 12 hours  dexMEDEtomidine Infusion 0.2 MICROgram(s)/kG/Hr (3.63 mL/Hr) IV Continuous <Continuous>  escitalopram 5 milliGRAM(s) Oral daily  fluticasone propionate 50 MICROgram(s)/spray Nasal Spray 1 Spray(s) Both Nostrils two times a day  insulin glargine Injectable (LANTUS) 14 Unit(s) SubCutaneous at bedtime  insulin lispro (ADMELOG) corrective regimen sliding scale   SubCutaneous every 6 hours  lacosamide IVPB 50 milliGRAM(s) IV Intermittent every 12 hours  metroNIDAZOLE  IVPB      metroNIDAZOLE  IVPB 500 milliGRAM(s) IV Intermittent every 8 hours  multivitamin 1 Tablet(s) Oral daily  norepinephrine Infusion 0.05 MICROgram(s)/kG/Min (6.81 mL/Hr) IV Continuous <Continuous>  pantoprazole Infusion 8 mG/Hr (10 mL/Hr) IV Continuous <Continuous>  potassium chloride    Tablet ER 20 milliEquivalent(s) Oral every 2 hours  sodium chloride 3 Gram(s) Oral every 6 hours  sucralfate suspension 1 Gram(s) Oral every 6 hours    MEDICATIONS  (PRN):  acetaminophen     Tablet .. 650 milliGRAM(s) Oral every 6 hours PRN Temp greater or equal to 38C (100.4F), Mild Pain (1 - 3)  fentaNYL    Injectable 25 MICROGram(s) IV Push every 2 hours PRN vent dysynchrony  ondansetron Injectable 4 milliGRAM(s) IV Push every 6 hours PRN Nausea and/or Vomiting    Vital Signs Last 24 Hrs  T(C): 37.9 (02 Aug 2023 09:32), Max: 38 (01 Aug 2023 15:00)  T(F): 100.2 (02 Aug 2023 09:32), Max: 100.4 (01 Aug 2023 15:00)  HR: 110 (02 Aug 2023 14:00) (96 - 111)  BP: 108/61 (02 Aug 2023 14:00) (92/55 - 124/60)  BP(mean): 79 (02 Aug 2023 14:00) (69 - 90)  RR: 17 (02 Aug 2023 14:00) (11 - 23)  SpO2: 98% (02 Aug 2023 14:00) (98% - 100%)    Parameters below as of 02 Aug 2023 14:00  Patient On (Oxygen Delivery Method): ventilator, CPAP    O2 Concentration (%): 40    08-01 @ 07:01  -  08-02 @ 07:00  --------------------------------------------------------  IN: 1506 mL / OUT: 2500 mL / NET: -994 mL    08-02 @ 07:01  -  08-02 @ 14:28  --------------------------------------------------------  IN: 486.7 mL / OUT: 700 mL / NET: -213.3 mL    PHYSICAL EXAM:  General: Intubated, sedated  Lungs: Normal respiratory effort  Abdomen: Soft, non-tender non-distended; No rebound or guarding  : Dark yellow urine in [place]  Extremities: Warm, no edema  Neurological: Deferred    LABS:               7.7    3.43  )-----------( 78       ( 02 Aug 2023 04:44 )             23.1     08-02    141  |  112<H>  |  16  ----------------------------<  152<H>  3.4<L>   |  25  |  0.56    Ca    7.1<L>      02 Aug 2023 04:44  Phos  2.6     08-02  Mg     1.6     08-02    TPro  4.2<L>  /  Alb  2.2<L>  /  TBili  1.0  /  DBili  x   /  AST  21  /  ALT  15  /  AlkPhos  53  08-01    Culture - Sputum (collected 31 Jul 2023 17:19)  Source: ET Tube ET Tube  Gram Stain (31 Jul 2023 18:50):    No epithelial cells seen    Rare WBC's    No organisms seen  Final Report (02 Aug 2023 08:44):    Rare to few Klebsiella pneumoniae    Accompanied by normal respiratory lesa  Organism: Klebsiella pneumoniae (02 Aug 2023 08:44)  Organism: Klebsiella pneumoniae (02 Aug 2023 08:44)    Culture - Blood (collected 31 Jul 2023 09:02)  Source: .Blood Blood  Preliminary Report (02 Aug 2023 10:01):    No growth at 2 days.    RADIOLOGY & ADDITIONAL STUDIES:  Reviewed

## 2023-08-02 NOTE — PROGRESS NOTE ADULT - ATTENDING COMMENTS
Patient seen and discussed with fellow as noted above
Acute on Chronic Hyponatremia as outlined in Dr. Purvis's note above.  Case reviewed and pt seen at bedside. Appears comfortable sitting in chair, without complaints.  Labs and urine studies appreciated.  Requested follow up BMP today.  As per Fellow,   Continue with Salt tabs 2g Q6hrs   Can start on URENA 15g BID   Continue with Florinef   Continue with fluid restriction   Patient will need repeat BMP, Urine Na and Urine osm with PCP, early next week would prefer as early as Tuesday if discharged
I agree with the fellow's findings and plans as written above with the following additions/amendments:    Seen and examined at bedside, intubated, sedated. gonzalez with cloudy urine flowing. Initial UA concerning for glomerular process but on repeat proteinuria not persistent, more than likely tubular in nature from ATN and sepsis. Will continue supportive care for now with fluids and abx, continue to monitor. Further recs as above, discussed in detail with primary team.
Na improved after tolvaptan  cont free water restrict and nacl tablets and follow Na daily
suggest wean florinef  restart Ure-Na
Patient seen and discussed with fellow plan as noted above
can restart Ure-Na  consider tolvaptan if Na not improving  would lower florinef to 0.1 (and then dc) -- not clear why on it (no diagnosis of adrenal insufficiency it appears) and has HTN and hypokalemia
Mental status improved. EEG no seizures  Plan as above
acute change in mental status 7/17, developed a fever, worsening leukocytosis, increasing lactate, and SVT. Infectious etiology suspected, so he was started on vanco and Jone empirically. Mental status now much improved.  EEG showed left frontotemporal sharply contoured waves, no seizures.  Given clincial improvement can d/c EEG  continue Keppra 1 g BID and plan as above

## 2023-08-02 NOTE — CONSULT NOTE ADULT - ASSESSMENT
ASSESSMENT/PLAN:    IMPRESSION: JORDAN CHAKRABORTY  is a 68y Male with hx GBM s/p resection 2022, admitted for AMS and suspected recurrence of GBM, hospital stay complicated by bacteremia and septic shock requiring prolonged intubation. ENT consulted for tracheostomy placement. Discussed with patient and family who are willing to proceed. Will plan to proceed with tracheostomy, pending availability of OR and staff. Plan to add on for Friday 8/4 with Dr. Ayala.    RECOMMENDATIONS:    - Please hold SQH/Lovenox at midnight prior to tracheostomy placement.  - Goal Vent settings: Breathing spontaneously, with FiO2 30%, wean as possible  - Please notify our team if hemodynamic changes or other clinical changes arise which would preclude/delay surgery  - Please obtain recent coags  - NPO at midnight prior to procedure.      Thank you for the kind referral and for allowing me to share in the care of JORDAN CHAKRABORTY If you have any questions, please do not hesitate to contact me.     Sincerely,  Arden Fitzpatrick MD PGY2  08-02-23 @ 20:19 ASSESSMENT/PLAN:    IMPRESSION: JORDAN CHAKRABORTY  is a 68y Male with hx GBM s/p resection 2022, admitted for AMS and suspected recurrence of GBM, hospital stay complicated by bacteremia and septic shock requiring prolonged intubation. ENT consulted for tracheostomy placement. Discussed with patient and family who are willing to proceed. Will plan to proceed with tracheostomy, pending availability of OR and staff. Plan for Friday 8/4 with Dr. Ayala.    RECOMMENDATIONS:    - Please hold SQH/Lovenox at midnight prior to tracheostomy placement.  - Goal Vent settings: Breathing spontaneously, with FiO2 30%, wean as possible  - Please notify our team if hemodynamic changes or other clinical changes arise which would preclude/delay surgery  - Please obtain recent coags  - NPO at midnight prior to procedure.      Thank you for the kind referral and for allowing me to share in the care of JORDAN CHAKRABORTY If you have any questions, please do not hesitate to contact me.     Sincerely,  Arden Fitzpatrick MD PGY2  08-02-23 @ 20:19

## 2023-08-02 NOTE — PROGRESS NOTE ADULT - SUBJECTIVE AND OBJECTIVE BOX
INFECTIOUS DISEASES CONSULT FOLLOW-UP NOTE    INTERVAL HPI/OVERNIGHT EVENTS:      ROS:   Constitutional, eyes, ENT, cardiovascular, respiratory, gastrointestinal, genitourinary, integumentary, neurological, psychiatric and heme/lymph are otherwise negative other than noted above       ANTIBIOTICS/RELEVANT:    MEDICATIONS  (STANDING):  atorvastatin 20 milliGRAM(s) Oral at bedtime  cefTRIAXone   IVPB 1000 milliGRAM(s) IV Intermittent every 24 hours  chlorhexidine 0.12% Liquid 15 milliLiter(s) Oral Mucosa every 12 hours  chlorhexidine 2% Cloths 1 Application(s) Topical <User Schedule>  dexAMETHasone  Injectable 2 milliGRAM(s) IV Push every 12 hours  dexMEDEtomidine Infusion 0.2 MICROgram(s)/kG/Hr (3.63 mL/Hr) IV Continuous <Continuous>  escitalopram 5 milliGRAM(s) Oral daily  fluticasone propionate 50 MICROgram(s)/spray Nasal Spray 1 Spray(s) Both Nostrils two times a day  insulin glargine Injectable (LANTUS) 14 Unit(s) SubCutaneous at bedtime  insulin lispro (ADMELOG) corrective regimen sliding scale   SubCutaneous every 6 hours  lacosamide IVPB 50 milliGRAM(s) IV Intermittent every 12 hours  metroNIDAZOLE  IVPB      metroNIDAZOLE  IVPB 500 milliGRAM(s) IV Intermittent every 8 hours  multivitamin 1 Tablet(s) Oral daily  norepinephrine Infusion 0.05 MICROgram(s)/kG/Min (6.81 mL/Hr) IV Continuous <Continuous>  pantoprazole Infusion 8 mG/Hr (10 mL/Hr) IV Continuous <Continuous>  potassium phosphate IVPB 15 milliMole(s) IV Intermittent once  sodium chloride 3 Gram(s) Oral every 6 hours  sucralfate suspension 1 Gram(s) Oral every 6 hours    MEDICATIONS  (PRN):  acetaminophen     Tablet .. 650 milliGRAM(s) Oral every 6 hours PRN Temp greater or equal to 38C (100.4F), Mild Pain (1 - 3)  fentaNYL    Injectable 25 MICROGram(s) IV Push every 2 hours PRN vent dysynchrony  ondansetron Injectable 4 milliGRAM(s) IV Push every 6 hours PRN Nausea and/or Vomiting        Vital Signs Last 24 Hrs  T(C): 37.9 (02 Aug 2023 09:32), Max: 38 (01 Aug 2023 15:00)  T(F): 100.2 (02 Aug 2023 09:32), Max: 100.4 (01 Aug 2023 15:00)  HR: 106 (02 Aug 2023 10:00) (96 - 114)  BP: 117/60 (02 Aug 2023 10:00) (92/55 - 124/60)  BP(mean): 81 (02 Aug 2023 10:00) (69 - 90)  RR: 16 (02 Aug 2023 10:00) (11 - 31)  SpO2: 98% (02 Aug 2023 10:00) (98% - 100%)    Parameters below as of 02 Aug 2023 10:00  Patient On (Oxygen Delivery Method): ventilator    O2 Concentration (%): 40    08-01-23 @ 07:01  -  08-02-23 @ 07:00  --------------------------------------------------------  IN: 1506 mL / OUT: 2500 mL / NET: -994 mL    08-02-23 @ 07:01  -  08-02-23 @ 11:19  --------------------------------------------------------  IN: 114 mL / OUT: 500 mL / NET: -386 mL      PHYSICAL EXAM:  Constitutional: alert, NAD  Eyes: the sclera and conjunctiva were normal.   ENT: the ears and nose were normal in appearance.   Neck: the appearance of the neck was normal and the neck was supple.   Pulmonary: no respiratory distress and lungs were clear to auscultation bilaterally.   Heart: heart rate was normal and rhythm regular, normal S1 and S2  Vascular:. there was no peripheral edema  Abdomen: normal bowel sounds, soft, non-tender  Neurological: no focal deficits.   Psychiatric: the affect was normal        LABS:                        7.7    3.43  )-----------( 78       ( 02 Aug 2023 04:44 )             23.1     08-02    141  |  112<H>  |  16  ----------------------------<  152<H>  3.4<L>   |  25  |  0.56    Ca    7.1<L>      02 Aug 2023 04:44  Phos  2.6     08-02  Mg     1.6     08-02    TPro  4.2<L>  /  Alb  2.2<L>  /  TBili  1.0  /  DBili  x   /  AST  21  /  ALT  15  /  AlkPhos  53  08-01      Urinalysis Basic - ( 02 Aug 2023 04:44 )    Color: x / Appearance: x / SG: x / pH: x  Gluc: 152 mg/dL / Ketone: x  / Bili: x / Urobili: x   Blood: x / Protein: x / Nitrite: x   Leuk Esterase: x / RBC: x / WBC x   Sq Epi: x / Non Sq Epi: x / Bacteria: x        MICROBIOLOGY:      RADIOLOGY & ADDITIONAL STUDIES:  Reviewed INFECTIOUS DISEASES CONSULT FOLLOW-UP NOTE    INTERVAL HPI/OVERNIGHT EVENTS:    Patient seen and examined at bedside. ANA LILIA. Intubated and sedated. Unable to obtain ROS. Patient had EGD yesterday      ROS:   Constitutional, eyes, ENT, cardiovascular, respiratory, gastrointestinal, genitourinary, integumentary, neurological, psychiatric and heme/lymph are otherwise negative other than noted above       ANTIBIOTICS/RELEVANT:    MEDICATIONS  (STANDING):  atorvastatin 20 milliGRAM(s) Oral at bedtime  cefTRIAXone   IVPB 1000 milliGRAM(s) IV Intermittent every 24 hours  chlorhexidine 0.12% Liquid 15 milliLiter(s) Oral Mucosa every 12 hours  chlorhexidine 2% Cloths 1 Application(s) Topical <User Schedule>  dexAMETHasone  Injectable 2 milliGRAM(s) IV Push every 12 hours  dexMEDEtomidine Infusion 0.2 MICROgram(s)/kG/Hr (3.63 mL/Hr) IV Continuous <Continuous>  escitalopram 5 milliGRAM(s) Oral daily  fluticasone propionate 50 MICROgram(s)/spray Nasal Spray 1 Spray(s) Both Nostrils two times a day  insulin glargine Injectable (LANTUS) 14 Unit(s) SubCutaneous at bedtime  insulin lispro (ADMELOG) corrective regimen sliding scale   SubCutaneous every 6 hours  lacosamide IVPB 50 milliGRAM(s) IV Intermittent every 12 hours  metroNIDAZOLE  IVPB      metroNIDAZOLE  IVPB 500 milliGRAM(s) IV Intermittent every 8 hours  multivitamin 1 Tablet(s) Oral daily  norepinephrine Infusion 0.05 MICROgram(s)/kG/Min (6.81 mL/Hr) IV Continuous <Continuous>  pantoprazole Infusion 8 mG/Hr (10 mL/Hr) IV Continuous <Continuous>  potassium phosphate IVPB 15 milliMole(s) IV Intermittent once  sodium chloride 3 Gram(s) Oral every 6 hours  sucralfate suspension 1 Gram(s) Oral every 6 hours    MEDICATIONS  (PRN):  acetaminophen     Tablet .. 650 milliGRAM(s) Oral every 6 hours PRN Temp greater or equal to 38C (100.4F), Mild Pain (1 - 3)  fentaNYL    Injectable 25 MICROGram(s) IV Push every 2 hours PRN vent dysynchrony  ondansetron Injectable 4 milliGRAM(s) IV Push every 6 hours PRN Nausea and/or Vomiting        Vital Signs Last 24 Hrs  T(C): 37.9 (02 Aug 2023 09:32), Max: 38 (01 Aug 2023 15:00)  T(F): 100.2 (02 Aug 2023 09:32), Max: 100.4 (01 Aug 2023 15:00)  HR: 106 (02 Aug 2023 10:00) (96 - 114)  BP: 117/60 (02 Aug 2023 10:00) (92/55 - 124/60)  BP(mean): 81 (02 Aug 2023 10:00) (69 - 90)  RR: 16 (02 Aug 2023 10:00) (11 - 31)  SpO2: 98% (02 Aug 2023 10:00) (98% - 100%)    Parameters below as of 02 Aug 2023 10:00  Patient On (Oxygen Delivery Method): ventilator    O2 Concentration (%): 40    08-01-23 @ 07:01  -  08-02-23 @ 07:00  --------------------------------------------------------  IN: 1506 mL / OUT: 2500 mL / NET: -994 mL    08-02-23 @ 07:01  -  08-02-23 @ 11:19  --------------------------------------------------------  IN: 114 mL / OUT: 500 mL / NET: -386 mL      PHYSICAL EXAM:  Constitutional: alert, NAD  Eyes: the sclera and conjunctiva were normal.   ENT: the ears and nose were normal in appearance. Intubated   Neck: the appearance of the neck was normal and the neck was supple.   Pulmonary: no respiratory distress and lungs were clear to auscultation bilaterally.   Heart: heart rate was normal and rhythm regular, normal S1 and S2  Vascular:. there was no peripheral edema  Abdomen: normal bowel sounds, soft, non-tender  Neurological: no focal deficits.   Psychiatric: the affect was normal        LABS:                        7.7    3.43  )-----------( 78       ( 02 Aug 2023 04:44 )             23.1     08-02    141  |  112<H>  |  16  ----------------------------<  152<H>  3.4<L>   |  25  |  0.56    Ca    7.1<L>      02 Aug 2023 04:44  Phos  2.6     08-02  Mg     1.6     08-02    TPro  4.2<L>  /  Alb  2.2<L>  /  TBili  1.0  /  DBili  x   /  AST  21  /  ALT  15  /  AlkPhos  53  08-01      Urinalysis Basic - ( 02 Aug 2023 04:44 )    Color: x / Appearance: x / SG: x / pH: x  Gluc: 152 mg/dL / Ketone: x  / Bili: x / Urobili: x   Blood: x / Protein: x / Nitrite: x   Leuk Esterase: x / RBC: x / WBC x   Sq Epi: x / Non Sq Epi: x / Bacteria: x        MICROBIOLOGY:  review    RADIOLOGY & ADDITIONAL STUDIES:  Reviewed

## 2023-08-02 NOTE — CONSULT NOTE ADULT - SUBJECTIVE AND OBJECTIVE BOX
OTOLARYNGOLOGY (ENT) CONSULTATION NOTE    PATIENT: JORDAN CHAKRABORTY     MRN: [id number]       : 57  DATE OF ADMISSION:23  DATE OF SERVICE: 23 @ 20:18    CHIEF COMPLAINT: failure to wean ventilation.     HISTORY OF PRESENT ILLNESS:  JORDAN CHAKRABORTY  is a 68y Male with PMHx GBM (s/p resection 2022), prior tracheal stenosis, history of tracheostomy (2022), recently decannulated who is currently admitted to ICU for septic shock requiring intubation and levophed drip. Patient was initially admitted on  after presenting with AMS and hyponatremia; workup concerning for recurrence of GBM and worsening hydrocephalus. Hospital stay complicated by GI bleed and sepsis 2/ k. pneumonia bacteremia. On , found to be acutely altered and hypotensive; was intubated and started on pressors, upgraded to ICU.  The patient has remained intubated and dependent on ventilator support, with primary team expecting patient will be unable to be extubated in setting of altered mental status The patient does have a history of previous tracheostomy for tracheal stenosis that he developed in setting of prolonged intubation following initial resection of GBM in . Tracheal stenosis was initially managed outpatient with repeat endoscopic dilations by CT surgery at [facility]; however stenosis failed to improve and ultimately had tracheostomy performed in 2022. Recently, in 2023, patient was in [place] and in setting of AMS decannulated himself. Traveled back to [place] however upon evaluation found that stoma had closed; family wished at that time to have patient remain decannulated as he was breathing comfortably on room air. The patient has no prior history of thyroidectomy or external beam radiation to the neck. The patient does not have restriction of neck movement or extension. ENT consulted for tracheostomy placement.  Previously on Eliquis; not on anticoagulation currently.  Currently sedated, mechanically ventilated, receiving nutrition via NGT with plan for PEG palcement.  Ventilatory support is minimal; tolerates CPAP with PEEP 5, PS 10 during day; however, requires AC/VC ventilation with FiO2 40% intermittently. Also requiring levophed gtt intermittently to maintain MAP goals. Palliative care has discussed GOC with family and guarded prognosis of current condition; family wishes to proceed with tracheostomy.     PAST MEDICAL HISTORY:  No pertinent past medical history    Diabetes mellitus    Hypertension    Glioblastoma    Type 2 diabetes mellitus    Hyperlipidemia    Iron deficiency anemia    Nephrolithiasis    Thrombocytopenia    Hyponatremia    BPH (benign prostatic hyperplasia)        CURRENT MEDICATIONS   acetaminophen     Tablet .. 650 milliGRAM(s) Oral every 6 hours PRN  atorvastatin 20 milliGRAM(s) Oral at bedtime  cefTRIAXone   IVPB 1000 milliGRAM(s) IV Intermittent every 24 hours  chlorhexidine 0.12% Liquid 15 milliLiter(s) Oral Mucosa every 12 hours  chlorhexidine 2% Cloths 1 Application(s) Topical <User Schedule>  dexAMETHasone  Injectable 2 milliGRAM(s) IV Push every 12 hours  dexMEDEtomidine Infusion 0.2 MICROgram(s)/kG/Hr IV Continuous <Continuous>  escitalopram 5 milliGRAM(s) Oral daily  fentaNYL    Injectable 25 MICROGram(s) IV Push every 2 hours PRN  fluticasone propionate 50 MICROgram(s)/spray Nasal Spray 1 Spray(s) Both Nostrils two times a day  insulin glargine Injectable (LANTUS) 14 Unit(s) SubCutaneous at bedtime  insulin lispro (ADMELOG) corrective regimen sliding scale   SubCutaneous every 6 hours  lacosamide IVPB 50 milliGRAM(s) IV Intermittent every 12 hours  metroNIDAZOLE  IVPB      metroNIDAZOLE  IVPB 500 milliGRAM(s) IV Intermittent every 8 hours  multivitamin 1 Tablet(s) Oral daily  norepinephrine Infusion 0.05 MICROgram(s)/kG/Min IV Continuous <Continuous>  ondansetron Injectable 4 milliGRAM(s) IV Push every 6 hours PRN  pantoprazole  Injectable 40 milliGRAM(s) IV Push every 12 hours  sodium chloride 3 Gram(s) Oral every 6 hours  sucralfate suspension 1 Gram(s) Oral every 6 hours      HOME MEDICATIONS:  acetaminophen 325 mg oral tablet: 2 tab(s) orally every 6 hours As needed Temp greater or equal to 38C (100.4F), Mild Pain (1 - 3)  fluticasone 50 mcg/inh nasal spray: 1 spray(s) nasal 2 times a day  hydrocortisone 1% topical cream: 1 Apply topically to affected area once a day  melatonin 3 mg oral tablet: 2 tab(s) orally once a day (at bedtime)  senna (sennosides) 8.6 mg oral tablet: 1 tab(s) orally once a day as needed for  constipation    SOCIAL HISTORY: Pertinent included in HPI   ALLERGIES:  amoxicillin (Rash)  ure-Na (Rash; Fever; Hypotension)    FAMILY HISTORY  No pertinent family history in first degree relatives    FH: diabetes mellitus (Father, Mother)    FH: hyperlipidemia (Father)    FH: hypertension (Father, Mother)      SURGICAL HISTORY:  No significant past surgical history    No significant past surgical history    S/P craniotomy    H/O tracheostomy        REVIEW OF SYSTEMS: Unable to obtain     PHYSICAL EXAMINATION:    The pertinent positive and negative examination findings were:    Gen: Intubated, sedated on precedex.   OC/OP: The patient is intubated with 7.5 ETT, secured with bridle device   Neck: There is no significant limitation of neck extension. Anterior laryngotracheal landmarks are palpable in the midline.   Prior tracheostomy stoma visualized to anterior neck ,appears well healed.   There are central lines in the neck      Ventillator Settings:  Mode: CPAP with PS, FiO2: 40, PEEP: 5, PS: 10, MAP: 8, PIP: 17    Vital Signs:  T(C): 38.7 (23 @ 17:43), Max: 38.7 (23 @ 17:43)  HR: 111 (23 @ 19:00) (98 - 113)  BP: 100/56 (23 @ 19:00) (92/55 - 124/60)  RR: 15 (23 @ 19:00) (11 - 23)  SpO2: 98% (23 @ 19:00) (98% - 100%)    Constitutional: The patient's developmental and nutritional status was assessed.  The patient's voice quality was assessed.    Head and Face: The head and face were inspected for deformities.    Eyes: Extraocular movements and primary gaze alignment were assessed.    Ears: The external ears were examined for deformities.   Nose: The nose was examined for external deformities.  The nasal septum, mucosa, and turbinates were inspected by anterior rhinoscopy.    Oral cavity: The lips, teeth, and gums were examined for abnormalities.  The oral mucosa was inspected for lesions.  Oropharynx: The tongue, palate, tonsils, lateral and posterior pharynx were inspected for the presence of asymmetry or mucosal lesions.    Neck: The tracheal position was noted, and the neck was palpated to determine if there were any asymmetries, abnormal neck masses, thyromegaly, or thyroid nodules.    Respiratory: The nature of the breathing and chest expansion/symmetry was observed.          LABORATORY:                        7.6    3.20  )-----------( 80       ( 02 Aug 2023 16:39 )             22.6    08-02    141  |  112<H>  |  16  ----------------------------<  152<H>  3.4<L>   |  25  |  0.56    Ca    7.1<L>      02 Aug 2023 04:44  Phos  2.6     08-  Mg     1.6         TPro  4.2<L>  /  Alb  2.2<L>  /  TBili  1.0  /  DBili  x   /  AST  21  /  ALT  15  /  AlkPhos  53  -   [id number]                 OTOLARYNGOLOGY (ENT) CONSULTATION NOTE    PATIENT: JORDAN CHAKRABORTY     MRN: [id number]       : 57  DATE OF ADMISSION:23  DATE OF SERVICE: 23 @ 20:18    CHIEF COMPLAINT: failure to wean ventilation.     HISTORY OF PRESENT ILLNESS:  JORDAN CHAKRABORTY  is a 68y Male with PMHx GBM (s/p resection 2022), prior tracheal stenosis, history of tracheostomy (2022), recently decannulated who is currently admitted to ICU for septic shock requiring intubation and levophed drip. Patient was initially admitted on  after presenting with AMS and hyponatremia; workup concerning for recurrence of GBM and worsening hydrocephalus. Hospital stay complicated by GI bleed and sepsis 2/ k. pneumonia bacteremia. On , found to be acutely altered and hypotensive; was intubated and started on pressors, upgraded to ICU.  The patient has remained intubated and dependent on ventilator support, with primary team expecting patient will be unable to be extubated in setting of altered mental status The patient does have a history of previous tracheostomy for tracheal stenosis that he developed in setting of prolonged intubation following initial resection of GBM in . Tracheal stenosis was initially managed outpatient with repeat endoscopic dilations by CT surgery at [facility]; however stenosis failed to improve and ultimately had tracheostomy performed in 2022. Recently, in 2023, patient was in DR and in setting of AMS decannulated himself. Traveled back to [place] however upon evaluation found that stoma had closed; family wished at that time to have patient remain decannulated as he was breathing comfortably on room air. The patient has no prior history of thyroidectomy or external beam radiation to the neck. The patient does not have restriction of neck movement or extension. ENT consulted for tracheostomy placement.  Previously on Eliquis; not on anticoagulation currently.  Currently sedated, mechanically ventilated, receiving nutrition via NGT with plan for PEG placement.  Ventilatory support is minimal; tolerates CPAP with PEEP 5, PS 10 during day; however, requires AC/VC ventilation with FiO2 40% intermittently. Also requiring levophed gtt intermittently to maintain MAP goals. Palliative care has discussed GOC with family and guarded prognosis of current condition; family wishes to proceed with tracheostomy.     PAST MEDICAL HISTORY:  No pertinent past medical history    Diabetes mellitus    Hypertension    Glioblastoma    Type 2 diabetes mellitus    Hyperlipidemia    Iron deficiency anemia    Nephrolithiasis    Thrombocytopenia    Hyponatremia    BPH (benign prostatic hyperplasia)        CURRENT MEDICATIONS   acetaminophen     Tablet .. 650 milliGRAM(s) Oral every 6 hours PRN  atorvastatin 20 milliGRAM(s) Oral at bedtime  cefTRIAXone   IVPB 1000 milliGRAM(s) IV Intermittent every 24 hours  chlorhexidine 0.12% Liquid 15 milliLiter(s) Oral Mucosa every 12 hours  chlorhexidine 2% Cloths 1 Application(s) Topical <User Schedule>  dexAMETHasone  Injectable 2 milliGRAM(s) IV Push every 12 hours  dexMEDEtomidine Infusion 0.2 MICROgram(s)/kG/Hr IV Continuous <Continuous>  escitalopram 5 milliGRAM(s) Oral daily  fentaNYL    Injectable 25 MICROGram(s) IV Push every 2 hours PRN  fluticasone propionate 50 MICROgram(s)/spray Nasal Spray 1 Spray(s) Both Nostrils two times a day  insulin glargine Injectable (LANTUS) 14 Unit(s) SubCutaneous at bedtime  insulin lispro (ADMELOG) corrective regimen sliding scale   SubCutaneous every 6 hours  lacosamide IVPB 50 milliGRAM(s) IV Intermittent every 12 hours  metroNIDAZOLE  IVPB      metroNIDAZOLE  IVPB 500 milliGRAM(s) IV Intermittent every 8 hours  multivitamin 1 Tablet(s) Oral daily  norepinephrine Infusion 0.05 MICROgram(s)/kG/Min IV Continuous <Continuous>  ondansetron Injectable 4 milliGRAM(s) IV Push every 6 hours PRN  pantoprazole  Injectable 40 milliGRAM(s) IV Push every 12 hours  sodium chloride 3 Gram(s) Oral every 6 hours  sucralfate suspension 1 Gram(s) Oral every 6 hours      HOME MEDICATIONS:  acetaminophen 325 mg oral tablet: 2 tab(s) orally every 6 hours As needed Temp greater or equal to 38C (100.4F), Mild Pain (1 - 3)  fluticasone 50 mcg/inh nasal spray: 1 spray(s) nasal 2 times a day  hydrocortisone 1% topical cream: 1 Apply topically to affected area once a day  melatonin 3 mg oral tablet: 2 tab(s) orally once a day (at bedtime)  senna (sennosides) 8.6 mg oral tablet: 1 tab(s) orally once a day as needed for  constipation    SOCIAL HISTORY: Pertinent included in HPI   ALLERGIES:  amoxicillin (Rash)  ure-Na (Rash; Fever; Hypotension)    FAMILY HISTORY  No pertinent family history in first degree relatives    FH: diabetes mellitus (Father, Mother)    FH: hyperlipidemia (Father)    FH: hypertension (Father, Mother)      SURGICAL HISTORY:  No significant past surgical history    No significant past surgical history    S/P craniotomy    H/O tracheostomy        REVIEW OF SYSTEMS: Unable to obtain     PHYSICAL EXAMINATION:    The pertinent positive and negative examination findings were:    Gen: Intubated, sedated on precedex.   OC/OP: The patient is intubated with 7.5 ETT, secured with bridle device   Neck: There is no significant limitation of neck extension. Anterior laryngotracheal landmarks are palpable in the midline.   Prior tracheostomy stoma visualized to anterior neck ,appears well healed. High riding innominate artery palpable  There are central lines in the neck      Ventillator Settings:  Mode: CPAP with PS, FiO2: 40, PEEP: 5, PS: 10, MAP: 8, PIP: 17    Vital Signs:  T(C): 38.7 (23 @ 17:43), Max: 38.7 (23 @ 17:43)  HR: 111 (23 @ 19:00) (98 - 113)  BP: 100/56 (23 @ 19:00) (92/55 - 124/60)  RR: 15 (23 @ 19:00) (11 - 23)  SpO2: 98% (23 @ 19:00) (98% - 100%)    Constitutional: The patient's developmental and nutritional status was assessed.  The patient's voice quality was assessed.    Head and Face: The head and face were inspected for deformities.    Eyes: Extraocular movements and primary gaze alignment were assessed.    Ears: The external ears were examined for deformities.   Nose: The nose was examined for external deformities.  The nasal septum, mucosa, and turbinates were inspected by anterior rhinoscopy.    Oral cavity: The lips, teeth, and gums were examined for abnormalities.  The oral mucosa was inspected for lesions.  Oropharynx: The tongue, palate, tonsils, lateral and posterior pharynx were inspected for the presence of asymmetry or mucosal lesions.    Neck: The tracheal position was noted, and the neck was palpated to determine if there were any asymmetries, abnormal neck masses, thyromegaly, or thyroid nodules.    Respiratory: The nature of the breathing and chest expansion/symmetry was observed.          LABORATORY:                        7.6    3.20  )-----------( 80       ( 02 Aug 2023 16:39 )             22.6    08-02    141  |  112<H>  |  16  ----------------------------<  152<H>  3.4<L>   |  25  |  0.56    Ca    7.1<L>      02 Aug 2023 04:44  Phos  2.6     08-  Mg     1.6         TPro  4.2<L>  /  Alb  2.2<L>  /  TBili  1.0  /  DBili  x   /  AST  21  /  ALT  15  /  AlkPhos  53     [id number]

## 2023-08-02 NOTE — PROGRESS NOTE ADULT - SUBJECTIVE AND OBJECTIVE BOX
HPI:   Osiel Norwood is a 67YO male with a past medical history of type 2 diabetes, hyperlipidemia, iron deficiency anemia, tracheal stenosis s/p tracheostomy (which was closed by ENT 7 days ago); glioblastoma s/p resection 2022, and Avastin treatment 3/2023 presenting with altered mental status and weakness x2 days. Per family, they last saw him last night around 6pm when he was at baseline. Patient has intermittent expressive aphasia but is getting worse and now is persistent, and has intermittent nausea and vomiting since last night. Patient was supposed to receive an outpatient brain MRI on 2023.  Stroke code performed in  is negative for CVA. Head CT shows new hyperdensity in frontal parietal - surgical site. MRI is ordered to rule out tumor recurrence.   Per daughter at bedside, Entresto and Eliquis were stopped per his PC - cardiologist.     S/Overnight events:    ANA LILIA overnight, remains sedated on precedex and intubated on ACVC      Hospital Course:   : Admitted. Na 115 in ED with repeat 118, started 3% at 30cc/hr, and salt tabs 3q6, stability CTH in ED showing Interval development of a 13 mm hyperdense nodule along the   inferior margin of the resection cavity which may represent progression   of disease with hemorrhage, repeat CTH stable, urine studies ordered  : Neuro stable, 12AM BMP Na114 3% increased to 40cc/hr, urine studies, pancx to r/o infection since pt is immunocompromised. Changed pantoprazole to sucralfate for GI ppx d/t thrombocytopenia. LE dopplers negative. DC'd 3%. ENT consulted to assess stoma, recommend follow up upon discharge with ENT, Repeat sodium 122. Restarted 3% at 30.  : ANA LILIA o/n neuro stable. remains on 3%@30cc/hr. Started florinef, increased 3% to 50cc/hr. Increased 3% to 75cc/hr.    7/10: continued current 3% rate o/n. Na 127 --> 125, cont 3% @75cc/hr, f/u repeat Na this evening, likely stepdown tomorrow. Pend dispo home with home PT/OT when able. Heme consulted for thrombocytopenia  : ANA LILIA o/n. Remains on 3% @75cc/hr. Decreased 3% to 50cc/hr. Started 1.2L fluid restriction. Repeat Na corrected 128  : ANA LILIA overnight, remains on 3%, SDU from ICU  : ANA LILIA overnight, neuro stable, on 3%@50, Am sodium 137, decreased 3% to 25cc/hr, repeat Na 138, decreased to 15cc/hr.  : ANA LILIA overnight, neuro stable, remains on 3%@15cc/hr  7/15: ANA LILIA overnight. Neuro exam stable. Pt remains on 3% saline.Sodium 134 this AM remains on 3%@25. Per nephrology recs hypertonic Na d'ceed, Na tabs 2q6, URENA 15g BID, cont florinef/ fluid restriction.Per nephrology Na >130 ok when ready for d/c   : ANA LILIA overnight, hypertonics d/c now on urea powder/1L fluid restriction/florinef and salt tabs per renal, hydrocortisone cream ordered for rash on back, rec for Home PT  : ANA LILIA overnight, following Na, renal following, ENT saw for hoarse voice rec followup with CT surgery for possible dilation, pending Home PT. Patient developed sudden onset severe headache. Hyperventilating with increased work of breathing. Wheezing in all lung fields to auscultation. Neuro intact on exam. Duoneb x1 ordered. Dilaudid 0.25 IV given for pain control. Subsequently patient developed nausea with multiple episodes of vomiting. Hypertensive with SBP in the 190s. Given hydralazine 10mg IVP, Zofran. Stroke code and rapid response called. STAT labs sent. CTH/CTA/CTP completed. Tachycardic to the 180s, consistent with SVT, remained hypertensive. Given 10 of labetalol, SVT broke. Transferred to Telemetry. WBC 23.43, lactate 8.5. Given 1L fluid bolus. Pan culture sent. Started empirically on Vanc/Meropenem.   : Put on eeg. Voiding while retaining urine, SC for 500cc. EEG +spikes, epilepsy consulted. CTA chest and CT A/P pending. Keppra increased 1g BID.   : Cont EEG, trend Na, possible NGT today if not able to tolerate PO. Blood cx growing GS + cocci in clusters, f/u MRSA swab and pan cx, cont meropenem and vancomycin. Vanc trough in am. F/u urine studies.   : Off EEG, neuro exam improved. ID following for concern for sepsis, likely aerobic blood cx bottle staph epi contaminant, cont monitoring off of abx, possible drug reaction as cause. F/u surveillance blood cx. Hyponatremia, repeat Na 129 overnight from 130, cont current regimen and f/u am labs and nephrology recs. AM na 129 continuing fluid restriction, nephro recommending restarting urena, but holding due to possible drug reaction previously.   : neurologically stable, c/w fluid restriction, f/u AM Na, urine osm, urine Na, cortisol. Given 15mg of tolvaptan. Fluid restriction, florinef d/c'd per nephro recs. Held salt tabs for today. 6pm BMP Na 122, 10pm BMP Na 131, urine osm 141  : ANA LILIA ovn, neuro stable. AM Na 133. Restarted salt tabs 3q6 and 1L fluid restriction. Lantus 10u at bedtime added. 9:30pm BMP per nephro Na 138  : ANA LILIA ovn, neuro stable. Na stable, discussed with nephrology can cont tolvaptan 15mg daily PRN for hyponatremia, only needed for hypoNa, can discontinue fluid restriction. Pt evaluated at bedside after nurse noted patient to have some expressive aphasia and perseverating; patient oriented to self, unable to say hospital or year, following all commands and DUMONT 5/5 strength, no drift, no facial droop, perseverating on pt's  when asked other questions. Pt afebrile, vitals stable, cont keppra 1g BID, cont decadron 4mg BID, discussed with Dr. Dorado, defer CTH at this time and monitor clinically. . Lantus increased to 14u at bedtime.  : o/n PSVT 13 beats followed by sinus tachycardia in setting of anxiety and net negative fluid balance.  Resolved to 102 with verbal comfort and further resolved with 500 cc NS. Lantus increased to 18u qhs. Dr. Costa consulted.  : ANA LILIA overnight. Neuro stable.   : ANA LILIA overnight. Neuro exam stable. Dispo pending.  : multiple BMs o/n. Na stable. Lexapro increased to 10mg daily for worsening depression. Held bowel regimen 2/2 multiple stools.   : ANA LILIA overnight, Na 133 f/u AM labs, pending [place]. Pt has episode of sustained tachycardia, c/o feeling warm, and dizzy while working with Pt. Given 500cc NS bolus, rectal temp was 99F. Family brought in medical marijuana. Pt had rectal temp 100.9, pancultured. Given another 1L bolus and started on maintenance fluids. Rectal temp 102. Started empirically on vanc/cipro/flagyl. Upgraded to telemetry.  : In AM patient had episode of emesis and was unresponsive to sternal rub. Dr. Huggins, neurointensivist at bedside. Repeat BP 60s/40s, tachycardic to 110s. Rapid response called. Levo gtt started. Upgraded to ICU. Intubated for airway protection. Likely septic shock given GNR in blood cx growing K.pneumoniae, Given additional 1L bolus x2. Abx changed to vanc and meropenem, ID following, rec to d/c vanc, c/w meropenem [id number], FOBT positive, keppra changed to vimpat 50BID, fentanyl 25mg q4hrs prn for vent dysynchrony, home lexapro decreased to 5mg, decadron decreased to 2q12, sucralfate increased to 1q6 iso GI bleed, pending repeat CBC, CMP, ABG, DIC labs, xray abdomen to r/o obstruction, propofol gtt started for sedation, weaning off precedex gtt, GI consulted rec protonix 40bid iso possible GI bleed, plan for possible EGD, albumin 1.7, 25% albumin 50ml given, s/p 1u PRBC and PLT repeat cbc hgb 8.0 from 6.6 and PLT 88 from 55. Protonix gtt started per GI recs.   : ANA LILIA overnight. Neuro stable. Hemoglobin 6.5, platelets 47 o/n, transfused 1uPRBC, 1u platelets in AM. Repeat Hb 7.1 and platelets 81. CBC repeated and Hb 7.2 and platelets 74. Arnie test negative. 1 set of surveillance blood cultures sent. NS increased to 120cc/hr. Holding off on CT C/A/P per nephrology recs due to concern about IV contrast. Temp 100.8 F, given tylenol. Additional 1u PRBC given in evening for Hb 6.8.   : 1u prbc given in am. 6 units lantus started at bedtime. Per ID stopped meropenam and started ceftriaxone 1qd and flagyl 500 q8.  Plan for EGD tomorrow with GI.   : Tachycardic, gave 20 IV lasix. Hgb at the time stable. NS@20 for renal protection. lantus increased to 14u. Lactate normalized. Given 500cc bolus for soft pressures, restarted levo gtt temporarily, now off. Endoscopy done with GI showed severe circumferential esophagitis, small ulcer to anterior stomach. propofol changed to precedex.   : ANA LILIA overnight, remains sedated on precedex and intubated on ACVC.      Vital Signs Last 24 Hrs  T(C): 37.8 (02 Aug 2023 05:35), Max: 38 (01 Aug 2023 15:00)  T(F): 100 (02 Aug 2023 05:35), Max: 100.4 (01 Aug 2023 15:00)  HR: 104 (02 Aug 2023 06:00) (96 - 114)  BP: 105/55 (02 Aug 2023 06:00) (92/55 - 124/60)  BP(mean): 73 (02 Aug 2023 06:00) (69 - 86)  RR: 17 (02 Aug 2023 06:00) (11 - 31)  SpO2: 99% (02 Aug 2023 06:00) (99% - 100%)    Parameters below as of 02 Aug 2023 06:00  Patient On (Oxygen Delivery Method): ventilator    O2 Concentration (%): 40    I&O's Detail    01 Aug 2023 07:01  -  02 Aug 2023 07:00  --------------------------------------------------------  IN:    Dexmedetomidine: 49 mL    IV PiggyBack: 200 mL    Norepinephrine: 342 mL    Pantoprazole: 230 mL    Propofol: 67 mL    sodium chloride 0.9%: 80 mL    Sodium Chloride 0.9% Bolus: 500 mL  Total IN: 1468 mL    OUT:    Indwelling Catheter - Urethral (mL): 2160 mL    Nasogastric/Oral tube (mL): 50 mL  Total OUT: 2210 mL    Total NET: -742 mL        I&O's Summary    01 Aug 2023 07:01  -  02 Aug 2023 07:00  --------------------------------------------------------  IN: 1468 mL / OUT: 2210 mL / NET: -742 mL        PHYSICAL EXAM:  General: Patient laying in bed, intubated, in no apparent distress. Sedation held for exam.   HEENT: 4mm pupils equal and reactive to light bilaterally.   Cardiovascular: Clear S1 and S2 without murmurs, gallops or rubs auscultated.  Respiratory: intubated, Clear to ausculation bilaterally without wheezing, rhonchi or rales.  GI: Soft, nontender, nondistended. Positive bowel sounds in all four quadrants.  Neuro: opens and closes eyes on command. midline gaze, grimacing to noxious stimuli. PERRL. Left Upper extremity AG to noxious, R upper extremity withdraws to noxious, b/l LE withdrawal to noxious, +cough/gag/corneals  Extremities: 2+ Dorsalis Pedis and Posterior Tibial pulses bilaterally.    TUBES/LINES:  [x] Campuzano  [] Lumbar Drain  [] Wound Drains  [x] Others: a-line, R IJ CVC    DIET:  [X] NPO  [] Mechanical  [] Tube feeds    LABS:                        7.7    3.43  )-----------( 78       ( 02 Aug 2023 04:44 )             23.1     08-02    141  |  112<H>  |  16  ----------------------------<  152<H>  3.4<L>   |  25  |  0.56    Ca    7.1<L>      02 Aug 2023 04:44  Phos  2.6     08-02  Mg     1.6     08-02    TPro  4.2<L>  /  Alb  2.2<L>  /  TBili  1.0  /  DBili  x   /  AST  21  /  ALT  15  /  AlkPhos  53  08-      Urinalysis Basic - ( 02 Aug 2023 04:44 )    Color: x / Appearance: x / SG: x / pH: x  Gluc: 152 mg/dL / Ketone: x  / Bili: x / Urobili: x   Blood: x / Protein: x / Nitrite: x   Leuk Esterase: x / RBC: x / WBC x   Sq Epi: x / Non Sq Epi: x / Bacteria: x      CARDIAC MARKERS ( 01 Aug 2023 05:30 )  x     / x     / 67 U/L / x     / x          CAPILLARY BLOOD GLUCOSE      POCT Blood Glucose.: 202 mg/dL (01 Aug 2023 23:40)  POCT Blood Glucose.: 183 mg/dL (01 Aug 2023 22:17)  POCT Blood Glucose.: 166 mg/dL (01 Aug 2023 17:00)  POCT Blood Glucose.: 227 mg/dL (01 Aug 2023 11:06)      Drug Levels: [] N/A    CSF Analysis: [] N/A      Allergies    amoxicillin (Rash)  ure-Na (Rash; Fever; Hypotension)    Intolerances      MEDICATIONS:  Antibiotics:  cefTRIAXone   IVPB 1000 milliGRAM(s) IV Intermittent every 24 hours  metroNIDAZOLE  IVPB      metroNIDAZOLE  IVPB 500 milliGRAM(s) IV Intermittent every 8 hours    Neuro:  acetaminophen     Tablet .. 650 milliGRAM(s) Oral every 6 hours PRN  dexMEDEtomidine Infusion 0.2 MICROgram(s)/kG/Hr IV Continuous <Continuous>  escitalopram 5 milliGRAM(s) Oral daily  fentaNYL    Injectable 25 MICROGram(s) IV Push every 2 hours PRN  lacosamide IVPB 50 milliGRAM(s) IV Intermittent every 12 hours  ondansetron Injectable 4 milliGRAM(s) IV Push every 6 hours PRN    Anticoagulation:    OTHER:  atorvastatin 20 milliGRAM(s) Oral at bedtime  chlorhexidine 0.12% Liquid 15 milliLiter(s) Oral Mucosa every 12 hours  chlorhexidine 2% Cloths 1 Application(s) Topical <User Schedule>  dexAMETHasone  Injectable 2 milliGRAM(s) IV Push every 12 hours  fluticasone propionate 50 MICROgram(s)/spray Nasal Spray 1 Spray(s) Both Nostrils two times a day  insulin glargine Injectable (LANTUS) 14 Unit(s) SubCutaneous at bedtime  insulin lispro (ADMELOG) corrective regimen sliding scale   SubCutaneous every 6 hours  norepinephrine Infusion 0.05 MICROgram(s)/kG/Min IV Continuous <Continuous>  pantoprazole Infusion 8 mG/Hr IV Continuous <Continuous>  psyllium Powder 1 Packet(s) Oral two times a day  sucralfate 1 Gram(s) Oral every 6 hours    IVF:  multivitamin 1 Tablet(s) Oral daily  sodium chloride 3 Gram(s) Oral every 6 hours    CULTURES:  Culture Results:   Rare to few Klebsiella pneumoniae  Susceptibility to follow.  Routine respiratory lesa absent to date  Culture in progress ( @ 17:19)  Culture Results:   No growth at 1 day. ( @ 09:02)    RADIOLOGY & ADDITIONAL TESTS:      ASSESSMENT:  68y Male s/p        PLAN:  NEURO:    CARDIOVASCULAR:    PULMONARY:    RENAL:    GI:    HEME:    ID:    ENDO:    DVT PROPHYLAXIS:  [] Venodynes                                [] Heparin/Lovenox    FALL RISK:  [] Low Risk                                    [] Impulsive    DISPOSITION:       Assessment:  Present when checked    []  GCS  E   V  M     Heart Failure: []Acute, [] acute on chronic , []chronic  Heart Failure:  [] Diastolic (HFpEF), [] Systolic (HFrEF), []Combined (HFpEF and HFrEF), [] RHF, [] Pulm HTN, [] Other    [] EUGENIA, [] ATN, [] AIN, [] other  [] CKD1, [] CKD2, [] CKD 3, [] CKD 4, [] CKD 5, []ESRD    Encephalopathy: [] Metabolic, [] Hepatic, [] toxic, [] Neurological, [] Other    Abnormal Nurtitional Status: [] malnurtition (see nutrition note), [ ]underweight: BMI < 19, [] morbid obesity: BMI >40, [] Cachexia    [] Sepsis  [] hypovolemic shock,[] cardiogenic shock, [] hemorrhagic shock, [] neuogenic shock  [] Acute Respiratory Failure  []Cerebral edema, [] Brain compression/ herniation,   [] Functional quadriplegia  [] Acute blood loss anemia   HPI:   Osiel Norwood is a 69YO male with a past medical history of type 2 diabetes, hyperlipidemia, iron deficiency anemia, tracheal stenosis s/p tracheostomy (which was closed by ENT 7 days ago); glioblastoma s/p resection 2022, and Avastin treatment 3/2023 presenting with altered mental status and weakness x2 days. Per family, they last saw him last night around 6pm when he was at baseline. Patient has intermittent expressive aphasia but is getting worse and now is persistent, and has intermittent nausea and vomiting since last night. Patient was supposed to receive an outpatient brain MRI on 2023.  Stroke code performed in  is negative for CVA. Head CT shows new hyperdensity in frontal parietal - surgical site. MRI is ordered to rule out tumor recurrence.   Per daughter at bedside, Entresto and Eliquis were stopped per his PC - cardiologist.     S/Overnight events:    ANA LILIA overnight, remains sedated on precedex and intubated on ACVC      Hospital Course:   : Admitted. Na 115 in ED with repeat 118, started 3% at 30cc/hr, and salt tabs 3q6, stability CTH in ED showing Interval development of a 13 mm hyperdense nodule along the   inferior margin of the resection cavity which may represent progression   of disease with hemorrhage, repeat CTH stable, urine studies ordered  : Neuro stable, 12AM BMP Na114 3% increased to 40cc/hr, urine studies, pancx to r/o infection since pt is immunocompromised. Changed pantoprazole to sucralfate for GI ppx d/t thrombocytopenia. LE dopplers negative. DC'd 3%. ENT consulted to assess stoma, recommend follow up upon discharge with ENT, Repeat sodium 122. Restarted 3% at 30.  : ANA LILIA o/n neuro stable. remains on 3%@30cc/hr. Started florinef, increased 3% to 50cc/hr. Increased 3% to 75cc/hr.    7/10: continued current 3% rate o/n. Na 127 --> 125, cont 3% @75cc/hr, f/u repeat Na this evening, likely stepdown tomorrow. Pend dispo home with home PT/OT when able. Heme consulted for thrombocytopenia  : ANA LILIA o/n. Remains on 3% @75cc/hr. Decreased 3% to 50cc/hr. Started 1.2L fluid restriction. Repeat Na corrected 128  : ANA LILIA overnight, remains on 3%, SDU from ICU  : ANA LILIA overnight, neuro stable, on 3%@50, Am sodium 137, decreased 3% to 25cc/hr, repeat Na 138, decreased to 15cc/hr.  : ANA LILIA overnight, neuro stable, remains on 3%@15cc/hr  7/15: ANA LILIA overnight. Neuro exam stable. Pt remains on 3% saline.Sodium 134 this AM remains on 3%@25. Per nephrology recs hypertonic Na d'ceed, Na tabs 2q6, URENA 15g BID, cont florinef/ fluid restriction.Per nephrology Na >130 ok when ready for d/c   : ANA LILIA overnight, hypertonics d/c now on urea powder/1L fluid restriction/florinef and salt tabs per renal, hydrocortisone cream ordered for rash on back, rec for Home PT  : ANA LILIA overnight, following Na, renal following, ENT saw for hoarse voice rec followup with CT surgery for possible dilation, pending Home PT. Patient developed sudden onset severe headache. Hyperventilating with increased work of breathing. Wheezing in all lung fields to auscultation. Neuro intact on exam. Duoneb x1 ordered. Dilaudid 0.25 IV given for pain control. Subsequently patient developed nausea with multiple episodes of vomiting. Hypertensive with SBP in the 190s. Given hydralazine 10mg IVP, Zofran. Stroke code and rapid response called. STAT labs sent. CTH/CTA/CTP completed. Tachycardic to the 180s, consistent with SVT, remained hypertensive. Given 10 of labetalol, SVT broke. Transferred to Telemetry. WBC 23.43, lactate 8.5. Given 1L fluid bolus. Pan culture sent. Started empirically on Vanc/Meropenem.   : Put on eeg. Voiding while retaining urine, SC for 500cc. EEG +spikes, epilepsy consulted. CTA chest and CT A/P pending. Keppra increased 1g BID.   : Cont EEG, trend Na, possible NGT today if not able to tolerate PO. Blood cx growing GS + cocci in clusters, f/u MRSA swab and pan cx, cont meropenem and vancomycin. Vanc trough in am. F/u urine studies.   : Off EEG, neuro exam improved. ID following for concern for sepsis, likely aerobic blood cx bottle staph epi contaminant, cont monitoring off of abx, possible drug reaction as cause. F/u surveillance blood cx. Hyponatremia, repeat Na 129 overnight from 130, cont current regimen and f/u am labs and nephrology recs. AM na 129 continuing fluid restriction, nephro recommending restarting urena, but holding due to possible drug reaction previously.   : neurologically stable, c/w fluid restriction, f/u AM Na, urine osm, urine Na, cortisol. Given 15mg of tolvaptan. Fluid restriction, florinef d/c'd per nephro recs. Held salt tabs for today. 6pm BMP Na 122, 10pm BMP Na 131, urine osm 141  : ANA LILIA ovn, neuro stable. AM Na 133. Restarted salt tabs 3q6 and 1L fluid restriction. Lantus 10u at bedtime added. 9:30pm BMP per nephro Na 138  : ANA LILIA ovn, neuro stable. Na stable, discussed with nephrology can cont tolvaptan 15mg daily PRN for hyponatremia, only needed for hypoNa, can discontinue fluid restriction. Pt evaluated at bedside after nurse noted patient to have some expressive aphasia and perseverating; patient oriented to self, unable to say hospital or year, following all commands and DUMONT 5/5 strength, no drift, no facial droop, perseverating on pt's  when asked other questions. Pt afebrile, vitals stable, cont keppra 1g BID, cont decadron 4mg BID, discussed with Dr. Dorado, defer CTH at this time and monitor clinically. . Lantus increased to 14u at bedtime.  : o/n PSVT 13 beats followed by sinus tachycardia in setting of anxiety and net negative fluid balance.  Resolved to 102 with verbal comfort and further resolved with 500 cc NS. Lantus increased to 18u qhs. Dr. Costa consulted.  : ANA LILIA overnight. Neuro stable.   : ANA LILIA overnight. Neuro exam stable. Dispo pending.  : multiple BMs o/n. Na stable. Lexapro increased to 10mg daily for worsening depression. Held bowel regimen 2/2 multiple stools.   : ANA LILIA overnight, Na 133 f/u AM labs, pending [place]. Pt has episode of sustained tachycardia, c/o feeling warm, and dizzy while working with Pt. Given 500cc NS bolus, rectal temp was 99F. Family brought in medical marijuana. Pt had rectal temp 100.9, pancultured. Given another 1L bolus and started on maintenance fluids. Rectal temp 102. Started empirically on vanc/cipro/flagyl. Upgraded to telemetry.  : In AM patient had episode of emesis and was unresponsive to sternal rub. Dr. Huggins, neurointensivist at bedside. Repeat BP 60s/40s, tachycardic to 110s. Rapid response called. Levo gtt started. Upgraded to ICU. Intubated for airway protection. Likely septic shock given GNR in blood cx growing K.pneumoniae, Given additional 1L bolus x2. Abx changed to vanc and meropenem, ID following, rec to d/c vanc, c/w meropenem [id number], FOBT positive, keppra changed to vimpat 50BID, fentanyl 25mg q4hrs prn for vent dysynchrony, home lexapro decreased to 5mg, decadron decreased to 2q12, sucralfate increased to 1q6 iso GI bleed, pending repeat CBC, CMP, ABG, DIC labs, xray abdomen to r/o obstruction, propofol gtt started for sedation, weaning off precedex gtt, GI consulted rec protonix 40bid iso possible GI bleed, plan for possible EGD, albumin 1.7, 25% albumin 50ml given, s/p 1u PRBC and PLT repeat cbc hgb 8.0 from 6.6 and PLT 88 from 55. Protonix gtt started per GI recs.   : ANA LILIA overnight. Neuro stable. Hemoglobin 6.5, platelets 47 o/n, transfused 1uPRBC, 1u platelets in AM. Repeat Hb 7.1 and platelets 81. CBC repeated and Hb 7.2 and platelets 74. Arnie test negative. 1 set of surveillance blood cultures sent. NS increased to 120cc/hr. Holding off on CT C/A/P per nephrology recs due to concern about IV contrast. Temp 100.8 F, given tylenol. Additional 1u PRBC given in evening for Hb 6.8.   : 1u prbc given in am. 6 units lantus started at bedtime. Per ID stopped meropenam and started ceftriaxone 1qd and flagyl 500 q8.  Plan for EGD tomorrow with GI.   : Tachycardic, gave 20 IV lasix. Hgb at the time stable. NS@20 for renal protection. lantus increased to 14u. Lactate normalized. Given 500cc bolus for soft pressures, restarted levo gtt temporarily, now off. Endoscopy done with GI showed severe circumferential esophagitis, small ulcer to anterior stomach. propofol changed to precedex.   : ANA LILIA overnight, remains sedated on precedex and intubated on ACVC.      Vital Signs Last 24 Hrs  T(C): 37.8 (02 Aug 2023 05:35), Max: 38 (01 Aug 2023 15:00)  T(F): 100 (02 Aug 2023 05:35), Max: 100.4 (01 Aug 2023 15:00)  HR: 104 (02 Aug 2023 06:00) (96 - 114)  BP: 105/55 (02 Aug 2023 06:00) (92/55 - 124/60)  BP(mean): 73 (02 Aug 2023 06:00) (69 - 86)  RR: 17 (02 Aug 2023 06:00) (11 - 31)  SpO2: 99% (02 Aug 2023 06:00) (99% - 100%)    Parameters below as of 02 Aug 2023 06:00  Patient On (Oxygen Delivery Method): ventilator    O2 Concentration (%): 40    I&O's Detail    01 Aug 2023 07:01  -  02 Aug 2023 07:00  --------------------------------------------------------  IN:    Dexmedetomidine: 49 mL    IV PiggyBack: 200 mL    Norepinephrine: 342 mL    Pantoprazole: 230 mL    Propofol: 67 mL    sodium chloride 0.9%: 80 mL    Sodium Chloride 0.9% Bolus: 500 mL  Total IN: 1468 mL    OUT:    Indwelling Catheter - Urethral (mL): 2160 mL    Nasogastric/Oral tube (mL): 50 mL  Total OUT: 2210 mL    Total NET: -742 mL        I&O's Summary    01 Aug 2023 07:01  -  02 Aug 2023 07:00  --------------------------------------------------------  IN: 1468 mL / OUT: 2210 mL / NET: -742 mL        PHYSICAL EXAM:  General: Patient laying in bed, intubated, in no apparent distress. Sedation held for exam.   HEENT: 4mm pupils equal and reactive to light bilaterally.   Cardiovascular: Clear S1 and S2 without murmurs, gallops or rubs auscultated.  Respiratory: intubated, Clear to ausculation bilaterally without wheezing, rhonchi or rales.  GI: Soft, nontender, nondistended. Positive bowel sounds in all four quadrants.  Neuro: opening eyes. midline gaze, grimacing to noxious stimuli. PERRL. Left Upper extremity AG to noxious, R upper extremity withdraws to noxious, b/l LE withdrawal to noxious, +cough/gag/corneals  Extremities: 2+ Dorsalis Pedis and Posterior Tibial pulses bilaterally.    TUBES/LINES:  [x] Campuzano  [] Lumbar Drain  [] Wound Drains  [x] Others: a-line, R IJ CVC    DIET:  [X] NPO  [] Mechanical  [] Tube feeds    LABS:                        7.7    3.43  )-----------( 78       ( 02 Aug 2023 04:44 )             23.1     08-02    141  |  112<H>  |  16  ----------------------------<  152<H>  3.4<L>   |  25  |  0.56    Ca    7.1<L>      02 Aug 2023 04:44  Phos  2.6     08-02  Mg     1.6     08-02    TPro  4.2<L>  /  Alb  2.2<L>  /  TBili  1.0  /  DBili  x   /  AST  21  /  ALT  15  /  AlkPhos  53  08-      Urinalysis Basic - ( 02 Aug 2023 04:44 )    Color: x / Appearance: x / SG: x / pH: x  Gluc: 152 mg/dL / Ketone: x  / Bili: x / Urobili: x   Blood: x / Protein: x / Nitrite: x   Leuk Esterase: x / RBC: x / WBC x   Sq Epi: x / Non Sq Epi: x / Bacteria: x      CARDIAC MARKERS ( 01 Aug 2023 05:30 )  x     / x     / 67 U/L / x     / x          CAPILLARY BLOOD GLUCOSE      POCT Blood Glucose.: 202 mg/dL (01 Aug 2023 23:40)  POCT Blood Glucose.: 183 mg/dL (01 Aug 2023 22:17)  POCT Blood Glucose.: 166 mg/dL (01 Aug 2023 17:00)  POCT Blood Glucose.: 227 mg/dL (01 Aug 2023 11:06)      Drug Levels: [] N/A    CSF Analysis: [] N/A      Allergies    amoxicillin (Rash)  ure-Na (Rash; Fever; Hypotension)    Intolerances      MEDICATIONS:  Antibiotics:  cefTRIAXone   IVPB 1000 milliGRAM(s) IV Intermittent every 24 hours  metroNIDAZOLE  IVPB      metroNIDAZOLE  IVPB 500 milliGRAM(s) IV Intermittent every 8 hours    Neuro:  acetaminophen     Tablet .. 650 milliGRAM(s) Oral every 6 hours PRN  dexMEDEtomidine Infusion 0.2 MICROgram(s)/kG/Hr IV Continuous <Continuous>  escitalopram 5 milliGRAM(s) Oral daily  fentaNYL    Injectable 25 MICROGram(s) IV Push every 2 hours PRN  lacosamide IVPB 50 milliGRAM(s) IV Intermittent every 12 hours  ondansetron Injectable 4 milliGRAM(s) IV Push every 6 hours PRN    Anticoagulation:    OTHER:  atorvastatin 20 milliGRAM(s) Oral at bedtime  chlorhexidine 0.12% Liquid 15 milliLiter(s) Oral Mucosa every 12 hours  chlorhexidine 2% Cloths 1 Application(s) Topical <User Schedule>  dexAMETHasone  Injectable 2 milliGRAM(s) IV Push every 12 hours  fluticasone propionate 50 MICROgram(s)/spray Nasal Spray 1 Spray(s) Both Nostrils two times a day  insulin glargine Injectable (LANTUS) 14 Unit(s) SubCutaneous at bedtime  insulin lispro (ADMELOG) corrective regimen sliding scale   SubCutaneous every 6 hours  norepinephrine Infusion 0.05 MICROgram(s)/kG/Min IV Continuous <Continuous>  pantoprazole Infusion 8 mG/Hr IV Continuous <Continuous>  psyllium Powder 1 Packet(s) Oral two times a day  sucralfate 1 Gram(s) Oral every 6 hours    IVF:  multivitamin 1 Tablet(s) Oral daily  sodium chloride 3 Gram(s) Oral every 6 hours    CULTURES:  Culture Results:   Rare to few Klebsiella pneumoniae  Susceptibility to follow.  Routine respiratory lesa absent to date  Culture in progress ( @ 17:19)  Culture Results:   No growth at 1 day. ( @ 09:02)    RADIOLOGY & ADDITIONAL TESTS:  < from: Xray Chest 1 View- PORTABLE-Urgent (Xray Chest 1 View- PORTABLE-Urgent .) (23 @ 12:39) >  IMPRESSION:  NG tube tip overlies the lateral gastric fundus.    < end of copied text >      ASSESSMENT:  67 YO male PMH T2DM, HLD, JAGDISH, tracheal stenosis s/p tracheostomy (s/p closure); glioblastoma s/p resection 2022, and [place] Avastin treatment 3/2023 adm with aphasia, N/V and severe hypoNa (118), imaging showing disesase progression, Hospital course complicated by sepsis secondary to bacteremia, decompensated, intubated, started on antibiotics and upgrated to ICU on .     PLAN:    Plan:  Neuro:  - neuro q4h/vitals q1hrs   - CTH : Interval development of a 13 mm hyperdense nodule along the inferior margin of the resection cavity which may represent progression of disease with hemorrhage. Repeat CTH stable, CTH  stable   - CTA : negative, CTA  stable  - CTP  stable.   - pain control prn, fentanyl IVP prn  - vimpat 50mg BID (switched from keppra 2/2 thrombocytopenia) for seizure tx  - Decadron 2mg BID  - c/w home Lexapro 5mg (home dose 5mg)  - home ASA 81 held i/s/o thrombocytopenia   - MRI brain  suspicious for recurrent tumor and hydro   - palliative following   - precedex gtt for sedation    Pulm:  - VC/AC 14/400/40/5    Cardio:  - MAP goal >65, levo gtt prn  -  outpatient echo EF 65%  - echo : EF 60-65%    GI:  - OG tube   - bowel regimen held for loose stools, LBM   - protonix gtt started for possible GI bleed  - sucralfate 1q6 while on steroids  - GI following  - EGD : severe circumferential esophagitis, small ulcer to anterior stomach    Renal/:  - Hyponatremic (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 as well as SSRI use), s/p tolvaptan ; currently on NaCl tabs 3g q6h  - Trend BMP  - IVL  - Campuzano replaced     Endo:  -A1c 5.7  -lantus 14u qhs, ISS  -h/o T2DM: home Januvia held  -h/o HLD: c/w Atorvastatin 20mg    Heme:  -SCDs for DVT ppx, SQL and Aspirin 81 held for thrombocytopenia   -heme consulted for thrombocytopenia- w/u sent, likely chronic thrombocytopenia d/t chemotherapy agent in past   -Per heme transfuse 1U plt if bleeding and platelet count <50k, if febrile and platelet count <20k  -1u PRBC, 1U PLT , 2u PRBC, 1u PLT   -LE dopplers  negative  -LUE doppler : L superficial cephalic vein thrombophlebitis  -CTA PE protocol for tachycardia: negative     ID:  - Pancultured , bcx growing K. pneumoniae, sputum cx K. pneumoniae, f/u blood cx   - meropenem 1g q8hrs (-), changed to Ceftriaxone/Flagyl per ID recs (- )  - CT CAP : Wall thickening in the colon suggesting an infectious or inflammatory colitis, Suspected small duodenal ulcer with surrounding edema in, Distended gallbladder w/o cholecystitis. Mild ascites. Bibasilar pneumonia L>R.  - Febrile, leukocytosis and tachycardia with elevated lactic acid on   - vancomycin/meropenem empirically (-), d/c per ID and repeat blood cx , NGTD    Dispo: NSICU status, full code, AR    D/w Dr. Dorado    Assessment:  Present when checked    []  GCS  E   V  M     Heart Failure: []Acute, [] acute on chronic , []chronic  Heart Failure:  [] Diastolic (HFpEF), [] Systolic (HFrEF), []Combined (HFpEF and HFrEF), [] RHF, [] Pulm HTN, [] Other    [] EUGENIA, [] ATN, [] AIN, [] other  [] CKD1, [] CKD2, [] CKD 3, [] CKD 4, [] CKD 5, []ESRD    Encephalopathy: [] Metabolic, [] Hepatic, [] toxic, [] Neurological, [] Other    Abnormal Nurtitional Status: [] malnurtition (see nutrition note), [ ]underweight: BMI < 19, [] morbid obesity: BMI >40, [] Cachexia    [] Sepsis  [] hypovolemic shock,[] cardiogenic shock, [] hemorrhagic shock, [] neuogenic shock  [] Acute Respiratory Failure  []Cerebral edema, [] Brain compression/ herniation,   [] Functional quadriplegia  [] Acute blood loss anemia

## 2023-08-03 NOTE — PROCEDURE NOTE - ADDITIONAL PROCEDURE DETAILS
2 sets of blood cultures obtained from left and right feet in a clean manner using chlorhexidine.
Hands were washed. Donned sterile gown and sterile gloves. Right neck/chest region was prepped using chlorhexidine and draped in sterile fashion. Sterile probe cover and sterile gel used. Internal jugular vein was identified using ultrasound. 1% lidocaine injected into area of access. Introducer needle was inserted into internal jugular vein under direct ultrasound guidance. Venous blood withdrawn. Syringe removed and guidewire was advanced into the introducer needle. Guidewire visualized in internal jugular by ultrasound. A small incision was made at the skin surface with a scalpel and the introducer needle was exchanged for a dilator over the guidewire. Appropriate dilation obtained. Dilator was exchanged for a triple lumen central venous catheter. The wire was removed (distal ti[p visualized) and the catheter was sutured in place at 17 cm. Dressing was placed over catheter at the insertion site.

## 2023-08-03 NOTE — PROGRESS NOTE ADULT - SUBJECTIVE AND OBJECTIVE BOX
Hematology Oncology Re-Consult Note      HPI:  Osiel Norwood is a 67YO male with a past medical history of type 2 diabetes, hyperlipidemia, iron deficiency anemia, tracheal stenosis s/p tracheostomy (which was closed by ENT 7 days ago); glioblastoma s/p resection 1/27/2022, and Avastin treatment 3/2023 presenting with altered mental status and weakness x2 days. Per family, they last saw him last night around 6pm when he was at baseline. Patient has intermittent expressive aphasia but is getting worse and now is persistent, and has intermittent nausea and vomiting since last night. Patient was supposed to receive an outpatient brain MRI on 7/25/2023.  Stroke code performed in  is negative for CVA. Head CT shows new hyperdensity in frontal parietal - surgical site. MRI is ordered to rule out tumor recurrence.   Per daughter at bedside, Entresto and Eliquis were stopped per his PC - cardiologist.      (07 Jul 2023 13:10)      Interval History:    SUBJECTIVE: Patient seen and examined at bedside.    OBJECTIVE:    VITAL SIGNS:  ICU Vital Signs Last 24 Hrs  T(C): 37.2 (03 Aug 2023 08:56), Max: 38.7 (02 Aug 2023 17:43)  T(F): 99 (03 Aug 2023 08:56), Max: 101.7 (02 Aug 2023 17:43)  HR: 91 (03 Aug 2023 10:00) (83 - 113)  BP: 93/51 (03 Aug 2023 10:00) (85/53 - 119/67)  BP(mean): 66 (03 Aug 2023 10:00) (65 - 88)  ABP: 117/57 (03 Aug 2023 10:00) (94/45 - 141/62)  ABP(mean): 75 (03 Aug 2023 10:00) (61 - 91)  RR: 16 (03 Aug 2023 10:00) (14 - 20)  SpO2: 100% (03 Aug 2023 10:00) (98% - 100%)    O2 Parameters below as of 03 Aug 2023 10:00  Patient On (Oxygen Delivery Method): ventilator, CPAP    O2 Concentration (%): 40      Mode: CPAP with PS, FiO2: 40, PEEP: 5, PS: 10, MAP: 7.7, PIP: 15    08-02 @ 07:01  -  08-03 @ 07:00  --------------------------------------------------------  IN: 1281.6 mL / OUT: 1950 mL / NET: -668.4 mL    08-03 @ 07:01  - 08-03 @ 10:59  --------------------------------------------------------  IN: 15 mL / OUT: 0 mL / NET: 15 mL      CAPILLARY BLOOD GLUCOSE      POCT Blood Glucose.: 163 mg/dL (03 Aug 2023 06:48)      PHYSICAL EXAM:  General: NAD, answering questions, pleasantly conversant  HEENT: NC/AT; PERRL, clear conjunctiva  Neck: supple  Respiratory: CTA b/l  Cardiovascular: +S1/S2; RRR  Abdomen: soft, NT/ND; +BS x4  Extremities: WWP, 2+ peripheral pulses b/l; no LE edema  Skin: normal color and turgor; no rash  Neurological:     MEDICATIONS:  MEDICATIONS  (STANDING):  atorvastatin 20 milliGRAM(s) Oral at bedtime  cefTRIAXone   IVPB 1000 milliGRAM(s) IV Intermittent every 24 hours  chlorhexidine 0.12% Liquid 15 milliLiter(s) Oral Mucosa every 12 hours  chlorhexidine 2% Cloths 1 Application(s) Topical <User Schedule>  dexAMETHasone  Injectable 2 milliGRAM(s) IV Push every 12 hours  escitalopram 5 milliGRAM(s) Oral daily  insulin glargine Injectable (LANTUS) 14 Unit(s) SubCutaneous at bedtime  insulin lispro (ADMELOG) corrective regimen sliding scale   SubCutaneous every 6 hours  lacosamide IVPB 50 milliGRAM(s) IV Intermittent every 12 hours  metroNIDAZOLE  IVPB      metroNIDAZOLE  IVPB 500 milliGRAM(s) IV Intermittent every 8 hours  multivitamin 1 Tablet(s) Oral daily  norepinephrine Infusion 0.05 MICROgram(s)/kG/Min (6.81 mL/Hr) IV Continuous <Continuous>  pantoprazole  Injectable 40 milliGRAM(s) IV Push every 12 hours  sodium chloride 3 Gram(s) Oral every 6 hours  sucralfate suspension 1 Gram(s) Oral every 6 hours    MEDICATIONS  (PRN):  acetaminophen     Tablet .. 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      Type 2 diabetes mellitus      Hyperlipidemia      Iron deficiency anemia      Nephrolithiasis      Thrombocytopenia      Hyponatremia      BPH (benign prostatic hyperplasia)      S/P craniotomy      H/O tracheostomy          ALLERGIES:  Allergies    amoxicillin (Rash)  ure-Na (Rash; Fever; Hypotension)    Intolerances        LABS:                        7.5    3.18  )-----------( 67       ( 03 Aug 2023 05:20 )             22.3     08-03    139  |  108  |  18  ----------------------------<  163<H>  3.6   |  24  |  0.47<L>    Ca    7.4<L>      03 Aug 2023 05:20  Phos  2.9     08-03  Mg     1.8     08-03        Urinalysis Basic - ( 03 Aug 2023 05:20 )    Color: x / Appearance: x / SG: x / pH: x  Gluc: 163 mg/dL / Ketone: x  / Bili: x / Urobili: x   Blood: x / Protein: x / Nitrite: x   Leuk Esterase: x / RBC: x / WBC x   Sq Epi: x / Non Sq Epi: x / Bacteria: x            RADIOLOGY, PATHOLOGY, & ADDITIONAL TESTS: Reviewed.   Hematology Oncology Re-Consult Note      HPI:  Osiel Nowrood is a 69YO male with a past medical history of type 2 diabetes, hyperlipidemia, iron deficiency anemia, tracheal stenosis s/p tracheostomy (which was closed by ENT 7 days ago); glioblastoma s/p resection 1/27/2022, and Avastin treatment 3/2023 presenting with altered mental status and weakness x2 days. Per family, they last saw him last night around 6pm when he was at baseline. Patient has intermittent expressive aphasia but is getting worse and now is persistent, and has intermittent nausea and vomiting since last night. Patient was supposed to receive an outpatient brain MRI on 7/25/2023.  Stroke code performed in  is negative for CVA. Head CT shows new hyperdensity in frontal parietal - surgical site. MRI is ordered to rule out tumor recurrence.   Per daughter at bedside, Entresto and Eliquis were stopped per his PC - cardiologist.     (07 Jul 2023 13:10)    Interval History: Since time of hem/onc signoff, patient w/ new flores of PLT 47 on 07/30. Of note, patient started on vanc/cipro/flagyl on 07/28 after new Tmax, and stepped up to ICU on 07/29 d/t septic shock likely 2/2 to K. pneumonia growing on blood cx. Currently on CTX and Flagyl, but remains on Levophed and Decadron. Currently intubated and sedated.    SUBJECTIVE: Patient seen and examined at bedside. Responsive to voice by opening eyes and turning head to examiner. Unable to follow commands.    OBJECTIVE:    VITAL SIGNS:  ICU Vital Signs Last 24 Hrs  T(C): 37.2 (03 Aug 2023 08:56), Max: 38.7 (02 Aug 2023 17:43)  T(F): 99 (03 Aug 2023 08:56), Max: 101.7 (02 Aug 2023 17:43)  HR: 91 (03 Aug 2023 10:00) (83 - 113)  BP: 93/51 (03 Aug 2023 10:00) (85/53 - 119/67)  BP(mean): 66 (03 Aug 2023 10:00) (65 - 88)  ABP: 117/57 (03 Aug 2023 10:00) (94/45 - 141/62)  ABP(mean): 75 (03 Aug 2023 10:00) (61 - 91)  RR: 16 (03 Aug 2023 10:00) (14 - 20)  SpO2: 100% (03 Aug 2023 10:00) (98% - 100%)    O2 Parameters below as of 03 Aug 2023 10:00  Patient On (Oxygen Delivery Method): ventilator, CPAP    O2 Concentration (%): 40      Mode: CPAP with PS, FiO2: 40, PEEP: 5, PS: 10, MAP: 7.7, PIP: 15    08-02 @ 07:01  -  08-03 @ 07:00  --------------------------------------------------------  IN: 1281.6 mL / OUT: 1950 mL / NET: -668.4 mL    08-03 @ 07:01  -  08-03 @ 10:59  --------------------------------------------------------  IN: 15 mL / OUT: 0 mL / NET: 15 mL      CAPILLARY BLOOD GLUCOSE      POCT Blood Glucose.: 163 mg/dL (03 Aug 2023 06:48)      PHYSICAL EXAM:  General: Intubated, sedated, responds to name/voice w/ eye opening and turning head but unable to follow commands  HEENT: NC/AT; clear conjunctiva, no scleral icterus  Neck: supple  Respiratory: CTA b/l  Cardiovascular: +S1/S2; RRR, no m/r/g  Abdomen: soft, NT/ND; +BS x4, no hepatosplenomegaly  Extremities: WWP, 2+ peripheral pulses b/l; no LE edema  Skin: normal color and turgor. Widespread ecchymoses and petechiae present, most prominent on abdomen and groin  Neurological: As above. Unable to assess full neurologic function given sedation    MEDICATIONS:  MEDICATIONS  (STANDING):  atorvastatin 20 milliGRAM(s) Oral at bedtime  cefTRIAXone   IVPB 1000 milliGRAM(s) IV Intermittent every 24 hours  chlorhexidine 0.12% Liquid 15 milliLiter(s) Oral Mucosa every 12 hours  chlorhexidine 2% Cloths 1 Application(s) Topical <User Schedule>  dexAMETHasone  Injectable 2 milliGRAM(s) IV Push every 12 hours  escitalopram 5 milliGRAM(s) Oral daily  insulin glargine Injectable (LANTUS) 14 Unit(s) SubCutaneous at bedtime  insulin lispro (ADMELOG) corrective regimen sliding scale   SubCutaneous every 6 hours  lacosamide IVPB 50 milliGRAM(s) IV Intermittent every 12 hours  metroNIDAZOLE  IVPB      metroNIDAZOLE  IVPB 500 milliGRAM(s) IV Intermittent every 8 hours  multivitamin 1 Tablet(s) Oral daily  norepinephrine Infusion 0.05 MICROgram(s)/kG/Min (6.81 mL/Hr) IV Continuous <Continuous>  pantoprazole  Injectable 40 milliGRAM(s) IV Push every 12 hours  sodium chloride 3 Gram(s) Oral every 6 hours  sucralfate suspension 1 Gram(s) Oral every 6 hours    MEDICATIONS  (PRN):  acetaminophen     Tablet .. 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      Type 2 diabetes mellitus      Hyperlipidemia      Iron deficiency anemia      Nephrolithiasis      Thrombocytopenia      Hyponatremia      BPH (benign prostatic hyperplasia)      S/P craniotomy      H/O tracheostomy          ALLERGIES:  Allergies    amoxicillin (Rash)  ure-Na (Rash; Fever; Hypotension)    Intolerances        LABS:                        7.5    3.18  )-----------( 67       ( 03 Aug 2023 05:20 )             22.3     08-03    139  |  108  |  18  ----------------------------<  163<H>  3.6   |  24  |  0.47<L>    Ca    7.4<L>      03 Aug 2023 05:20  Phos  2.9     08-03  Mg     1.8     08-03        Urinalysis Basic - ( 03 Aug 2023 05:20 )    Color: x / Appearance: x / SG: x / pH: x  Gluc: 163 mg/dL / Ketone: x  / Bili: x / Urobili: x   Blood: x / Protein: x / Nitrite: x   Leuk Esterase: x / RBC: x / WBC x   Sq Epi: x / Non Sq Epi: x / Bacteria: x            RADIOLOGY, PATHOLOGY, & ADDITIONAL TESTS: Reviewed.

## 2023-08-03 NOTE — PRE-ANESTHESIA EVALUATION ADULT - NSRADCARDRESULTSFT_GEN_ALL_CORE
7/31 JT  1. Patient was tachycardic during the study.   2. Normal left and right ventricular size and systolic function.   3. No significant valvular disease.   4. No evidence of pulmonary hypertension.   5. No pericardial effusion.   6. The aortic root is borderline dilated. The aortic root measures 3.70   cm at level of the sinuses of Valsalva (normal 3.1-3.7 cm for men,   2.7-3.3 cm for women).

## 2023-08-03 NOTE — CHART NOTE - NSCHARTNOTEFT_GEN_A_CORE
Pre Operative Note:    68 M hx GBM, admitted for AMS, intubated on 7/29 due to AMS and hypotension, who has failed weaning from ventilator. Plan for conversion to tracheostomy 8/4/23. Patient remains dependent on pressor drip to maintain MAP >65 mmHg. On Flagyl, Ceftriaxone for severe sepsis 2/2 k. pneumoniae bacteremia. Currently NPO.     Current Vent Settings: CPAP with PEEP 5, PS 10, FiO2 40%    Drips: norepinephrine @ 0.04 mcg/kg/min, mIVF    CURRENT MEDICATIONS   acetaminophen     Tablet .. 650 milliGRAM(s) Oral every 6 hours PRN  atorvastatin 20 milliGRAM(s) Oral at bedtime  cefTRIAXone   IVPB 1000 milliGRAM(s) IV Intermittent every 24 hours  chlorhexidine 0.12% Liquid 15 milliLiter(s) Oral Mucosa every 12 hours  chlorhexidine 2% Cloths 1 Application(s) Topical <User Schedule>  dexAMETHasone  Injectable 2 milliGRAM(s) IV Push every 12 hours  dexMEDEtomidine Infusion 0.2 MICROgram(s)/kG/Hr IV Continuous <Continuous>  escitalopram 5 milliGRAM(s) Oral daily  fentaNYL    Injectable 25 MICROGram(s) IV Push every 2 hours PRN  fluticasone propionate 50 MICROgram(s)/spray Nasal Spray 1 Spray(s) Both Nostrils two times a day  insulin glargine Injectable (LANTUS) 14 Unit(s) SubCutaneous at bedtime  insulin lispro (ADMELOG) corrective regimen sliding scale   SubCutaneous every 6 hours  lacosamide IVPB 50 milliGRAM(s) IV Intermittent every 12 hours  metroNIDAZOLE  IVPB      metroNIDAZOLE  IVPB 500 milliGRAM(s) IV Intermittent every 8 hours  multivitamin 1 Tablet(s) Oral daily  norepinephrine Infusion 0.05 MICROgram(s)/kG/Min IV Continuous <Continuous>  ondansetron Injectable 4 milliGRAM(s) IV Push every 6 hours PRN  pantoprazole  Injectable 40 milliGRAM(s) IV Push every 12 hours  sodium chloride 3 Gram(s) Oral every 6 hours  sucralfate suspension 1 Gram(s) Oral every 6 hours    Physical Exam:  Gen: Intubated, sedated on precedex.   OC/OP: The patient is intubated with 7.5 ETT, secured with bridle device   Neck: There is no significant limitation of neck extension. Anterior laryngotracheal landmarks are palpable in the midline.   Prior tracheostomy stoma visualized to anterior neck ,appears well healed. High riding innominate artery palpable.  There are central lines in the neck    Plan:  - please keep NPO past midnight for procedure tomorrow  - Scheduled for tracheostomy in OR   - Maintain active type and screen, updated coagulation studies tomorrow morning  - Trend hemoglobin  - Hold AM dose of Lovenox   - ENT to follow and update primary team on expected time for procedure tomorrow  - consent obtained from HCP, in chart     Arden Fitzpatrick MD PGY2

## 2023-08-03 NOTE — PROGRESS NOTE ADULT - ASSESSMENT
68m hx GBM s/p resection, recent chemo, type 2 DM, admit septic shock 2/2 klebsiella bacteremia; s/p EGD - severe erosive esophagitis     -s/p EGD today; c/w Protonix drip; Carafate suspension; goal repeat EGD in 6-8 weeks   -NGT placed w/ direct visualization via EGD; NPO w/ meds   -s/p 1U PRBC today, platelets continue to down trend; platelet ab testing   -hypotension in setting of sepsis; wean levophed MAP goal >65   -neuro check q4  -on mechanical vent; CPAP trials as tolerated; trach placement tomorrow   -c/w ceftriaxone & flagyl for PNA & bacteremia ID following

## 2023-08-03 NOTE — PROGRESS NOTE ADULT - SUBJECTIVE AND OBJECTIVE BOX
=========================  NSICU ATTENDING PROGRESS NOTE  =========================    67 y/o male with a past medical history of type 2 diabetes, hyperlipidemia, iron deficiency anemia, tracheal stenosis s/p tracheostomy, glioblastoma s/p resection 1/27/2022, and Avastin treatment 3/2023 presenting with altered mental status and weakness x2 days. Per family, LKN 6pm on 07/05 when he was at baseline. Patient has intermittent expressive aphasia but is getting worse and now is persistent, and has intermittent nausea and vomiting since 07/05. Patient was scheduled to have outpatient brain MRI on 7/25/2023.  Stroke code called in ED. Head CT shows new hyperdensity in frontal parietal - surgical site. MRI ordered to rule out tumor recurrence.   Per daughter at bedside, Entresto and Eliquis were stopped per his PC - cardiologist.  (07 Jul 2023 13:10)    HOSP COURSE:   7/27: multiple BMs o/n. Na stable. Lexapro increased to 10mg daily for worsening depression. Held bowel regimen 2/2 multiple stools.   7/28: ANA LILIA overnight. Na 133 f/u AM labs, pending [place]. Pt has episode of sustained tachycardia, c/o feeling warm, and dizzy while working with Pt. Given 500cc NS bolus, rectal temp was 99F. Family brought in medical marijuana. Pt had rectal temp 100.9, pancultured. Given another 1L bolus and started on maintenance fluids. Rectal temp 102. Started empirically on vanc/cipro/flagyl. Upgraded to telemetry.  7/29: In AM patient had episode of emesis and was unresponsive to sternal rub. Dr. Huggins, neurointensivist at bedside. Repeat BP 60s/40s, tachycardic to 110s. Rapid response called. Levo gtt started. Upgraded to ICU. Intubated for airway protection. Likely septic shock given GNR in blood cx growing K.pneumoniae, Given additional 1L bolus x2. Abx changed to vanc and meropenem, ID following, rec to d/c vanc, c/w meropenem 2ii8mlh, FOBT positive, keppra changed to vimpat 50BID, fentanyl 25mg q4hrs prn for vent dysynchrony, home lexapro decreased to 5mg, decadron decreased to 2q12, sucralfate increased to 1q6 iso GI bleed, pending repeat CBC, CMP, ABG, DIC labs, xray abdomen to r/o obstruction, propofol gtt started for sedation, weaning off precedex gtt, GI consulted rec protonix 40bid iso possible GI bleed, plan for possible EGD, albumin 1.7, 25% albumin 50ml given, s/p 1u PRBC and PLT repeat cbc hgb 8.0 from 6.6 and PLT 88 from 55. Protonix gtt started per GI recs.   7/30: ANA LILIA overnight. Neuro stable. Hemoglobin 6.5, platelets 47 o/n, transfused 1uPRBC, 1u platelets in AM. Repeat Hb 7.1 and platelets 81. CBC repeated and Hb 7.2 and platelets 74. Arnie test negative. 1 set of surveillance blood cultures sent. NS increased to 120cc/hr. Holding off on CT C/A/P per nephrology recs due to concern about IV contrast. Temp 100.8 F, given tylenol. Additional 1u PRBC given in evening for Hb 6.8.     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  Type 2 diabetes mellitus  Hyperlipidemia  Iron deficiency anemia  Nephrolithiasis  Thrombocytopenia  Hyponatremia  BPH (benign prostatic hyperplasia)  S/P craniotomy  H/O tracheostomy    REVIEW OF SYSTEMS: [x] Unable to Assess due to neurologic exam   [ ] All ROS addressed below are non-contributory, except:    ICU Vital Signs Last 24 Hrs  T(C): 37.2 (03 Aug 2023 08:56), Max: 38.7 (02 Aug 2023 17:43)  T(F): 99 (03 Aug 2023 08:56), Max: 101.7 (02 Aug 2023 17:43)  HR: 96 (03 Aug 2023 08:00) (83 - 113)  BP: 85/53 (03 Aug 2023 08:00) (85/53 - 119/67)  BP(mean): 65 (03 Aug 2023 08:00) (65 - 88)  ABP: 101/47 (03 Aug 2023 08:00) (94/45 - 141/62)  ABP(mean): 63 (03 Aug 2023 08:00) (61 - 91)  RR: 17 (03 Aug 2023 08:00) (14 - 20)  SpO2: 100% (03 Aug 2023 08:00) (98% - 100%)      08-02-23 @ 07:01  -  08-03-23 @ 07:00  --------------------------------------------------------  IN: 1281.6 mL / OUT: 1950 mL / NET: -668.4 mL    08-03-23 @ 07:01  -  08-03-23 @ 09:08  --------------------------------------------------------  IN: 15 mL / OUT: 0 mL / NET: 15 mL      Mode: CPAP with PS, FiO2: 40, PEEP: 5, PS: 10, MAP: 7.8, PIP: 19    PHYSICAL EXAM:  General: No Acute Distress, intubated   HEENT: ETT, OG tube  Neurological: Grimaces to pain, following commands to open and close eyes, sticks out tongue, ,     eyes opening to noxious stim, tracks, pupils 3mm reactive & midline b/l, WD all extremities to noxious stim, +cough/+gag,   Pulmonary: Diminished at bases  Cardiovascular: S1, S2, Regular Rate and Rhythm   Gastrointestinal: Soft, Nontender, Nondistended   Extremities: No edema       acetaminophen     Tablet .. 650 milliGRAM(s) Oral every 6 hours PRN  atorvastatin 20 milliGRAM(s) Oral at bedtime  cefTRIAXone   IVPB 1000 milliGRAM(s) IV Intermittent every 24 hours  chlorhexidine 0.12% Liquid 15 milliLiter(s) Oral Mucosa every 12 hours  chlorhexidine 2% Cloths 1 Application(s) Topical <User Schedule>  dexAMETHasone  Injectable 2 milliGRAM(s) IV Push every 12 hours  dexMEDEtomidine Infusion 0.2 MICROgram(s)/kG/Hr (3.63 mL/Hr) IV Continuous <Continuous>  escitalopram 5 milliGRAM(s) Oral daily  fentaNYL    Injectable 25 MICROGram(s) IV Push every 2 hours PRN  fluticasone propionate 50 MICROgram(s)/spray Nasal Spray 1 Spray(s) Both Nostrils two times a day  insulin glargine Injectable (LANTUS) 14 Unit(s) SubCutaneous at bedtime  insulin lispro (ADMELOG) corrective regimen sliding scale   SubCutaneous every 6 hours  lacosamide IVPB 50 milliGRAM(s) IV Intermittent every 12 hours  metroNIDAZOLE  IVPB      metroNIDAZOLE  IVPB 500 milliGRAM(s) IV Intermittent every 8 hours  multivitamin 1 Tablet(s) Oral daily  norepinephrine Infusion 0.05 MICROgram(s)/kG/Min (6.81 mL/Hr) IV Continuous <Continuous>  ondansetron Injectable 4 milliGRAM(s) IV Push every 6 hours PRN  pantoprazole  Injectable 40 milliGRAM(s) IV Push every 12 hours  sodium chloride 3 Gram(s) Oral every 6 hours  sucralfate suspension 1 Gram(s) Oral every 6 hours                          7.5    3.18  )-----------( 67       ( 03 Aug 2023 05:20 )             22.3     08-03    139  |  108  |  18  ----------------------------<  163<H>  3.6   |  24  |  0.47<L>    Ca    7.4<L>      03 Aug 2023 05:20  Phos  2.9     08-03  Mg     1.8     08-03      Culture - Blood (collected 07-31-23 @ 09:02)  Source: .Blood Blood  Preliminary Report (07-31-23 @ 22:00):    No growth at 12 hours    Culture - Blood (collected 07-30-23 @ 12:52)  Source: .Blood Blood  Preliminary Report (07-31-23 @ 02:00):    No growth at 12 hours

## 2023-08-03 NOTE — PROGRESS NOTE ADULT - SUBJECTIVE AND OBJECTIVE BOX
INTERVAL HISTORY: HPI:  Osiel Norwood is a 67YO male with a past medical history of type 2 diabetes, hyperlipidemia, iron deficiency anemia, tracheal stenosis s/p tracheostomy (which was closed by ENT 7 days ago); glioblastoma s/p resection 1/27/2022, and Avastin treatment 3/2023 presenting with altered mental status and weakness x2 days. Per family, they last saw him last night around 6pm when he was at baseline. Patient has intermittent expressive aphasia but is getting worse and now is persistent, and has intermittent nausea and vomiting since last night. Patient was supposed to receive an outpatient brain MRI on 7/25/2023.  Stroke code performed in  is negative for CVA. Head CT shows new hyperdensity in frontal parietal - surgical site. MRI is ordered to rule out tumor recurrence.   Per daughter at bedside, Entresto and Eliquis were stopped per his PC - cardiologist.     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  Type 2 diabetes mellitus  Hyperlipidemia  Iron deficiency anemia  Nephrolithiasis  Thrombocytopenia  Hyponatremia  BPH (benign prostatic hyperplasia)  S/P craniotomy  H/O tracheostomy      REVIEW OF SYSTEMS: [x] Unable to Assess due to neurologic exam   [ ] All ROS addressed below are non-contributory, except:  Neuro: [ ] Headache [ ] Back pain [ ] Numbness [ ] Weakness [ ] Ataxia [ ] Dizziness [ ] Aphasia [ ] Dysarthria [ ] Visual disturbance  Resp: [ ] Shortness of breath/dyspnea, [ ] Orthopnea [ ] Cough  CV: [ ] Chest pain [ ] Palpitation [ ] Lightheadedness [ ] Syncope  Renal: [ ] Thirst [ ] Edema  GI: [ ] Nausea [ ] Emesis [ ] Abdominal pain [ ] Constipation [ ] Diarrhea  Hem: [ ] Hematemesis [ ] bright red blood per rectum  ID: [ ] Fever [ ] Chills [ ] Dysuria  ENT: [ ] Rhinorrhea    PHYSICAL EXAM:    General: No Acute Distress, intubated   Neurological: grimace to pain, not following commands, eyes not open to nox, pupils 3mm reactive & midline b/l, WD all extremities to nox, localize b/l UE to nox, +cough/+gag,   Pulmonary: Clear to Auscultation, No Rales, No Rhonchi, No Wheezes   Cardiovascular: S1, S2, Regular Rate and Rhythm   Gastrointestinal: Soft, Nontender, Nondistended   Extremities: No edema     ICU Vital Signs Last 24 Hrs  T(C): 37.1 (03 Aug 2023 22:05), Max: 38 (03 Aug 2023 17:58)  T(F): 98.7 (03 Aug 2023 22:05), Max: 100.4 (03 Aug 2023 17:58)  HR: 89 (03 Aug 2023 23:00) (83 - 101)  BP: 119/63 (03 Aug 2023 23:00) (85/53 - 120/64)  BP(mean): 85 (03 Aug 2023 23:00) (64 - 88)  ABP: 135/59 (03 Aug 2023 23:00) (100/49 - 141/62)  ABP(mean): 85 (03 Aug 2023 23:00) (63 - 91)  RR: 16 (03 Aug 2023 23:00) (15 - 19)  SpO2: 100% (03 Aug 2023 23:00) (99% - 100%)      08-02-23 @ 07:01  -  08-03-23 @ 07:00  --------------------------------------------------------  IN: 1281.6 mL / OUT: 1950 mL / NET: -668.4 mL    08-03-23 @ 07:01  -  08-04-23 @ 00:00  --------------------------------------------------------  IN: 1584 mL / OUT: 1795 mL / NET: -211 mL        Mode: CPAP with PS, FiO2: 40, PEEP: 5, PS: 10, MAP: 8, PIP: 16    acetaminophen     Tablet .. 650 milliGRAM(s) Oral every 6 hours PRN  atorvastatin 20 milliGRAM(s) Oral at bedtime  cefTRIAXone   IVPB 1000 milliGRAM(s) IV Intermittent every 24 hours  chlorhexidine 0.12% Liquid 15 milliLiter(s) Oral Mucosa every 12 hours  chlorhexidine 2% Cloths 1 Application(s) Topical <User Schedule>  dexAMETHasone  Injectable 2 milliGRAM(s) IV Push every 12 hours  escitalopram 5 milliGRAM(s) Oral daily  fentaNYL    Injectable 25 MICROGram(s) IV Push every 2 hours PRN  insulin glargine Injectable (LANTUS) 14 Unit(s) SubCutaneous at bedtime  insulin lispro (ADMELOG) corrective regimen sliding scale   SubCutaneous every 6 hours  lacosamide IVPB 50 milliGRAM(s) IV Intermittent every 12 hours  metroNIDAZOLE  IVPB      metroNIDAZOLE  IVPB 500 milliGRAM(s) IV Intermittent every 8 hours  multivitamin 1 Tablet(s) Oral daily  norepinephrine Infusion 0.05 MICROgram(s)/kG/Min (6.81 mL/Hr) IV Continuous <Continuous>  ondansetron Injectable 4 milliGRAM(s) IV Push every 6 hours PRN  pantoprazole  Injectable 40 milliGRAM(s) IV Push every 12 hours  sodium chloride 3 Gram(s) Oral every 6 hours  sodium chloride 0.9%. 1000 milliLiter(s) (50 mL/Hr) IV Continuous <Continuous>  sucralfate suspension 1 Gram(s) Oral every 6 hours      LABS:  Na: 139 (08-03 @ 05:20), 141 (08-02 @ 04:44), 139 (08-01 @ 05:30)  K: 3.6 (08-03 @ 05:20), 3.4 (08-02 @ 04:44), 3.2 (08-01 @ 05:30)  Cl: 108 (08-03 @ 05:20), 112 (08-02 @ 04:44), 106 (08-01 @ 05:30)  CO2: 24 (08-03 @ 05:20), 25 (08-02 @ 04:44), 23 (08-01 @ 05:30)  BUN: 18 (08-03 @ 05:20), 16 (08-02 @ 04:44), 16 (08-01 @ 05:30)  Cr: 0.47 (08-03 @ 05:20), 0.56 (08-02 @ 04:44), 0.67 (08-01 @ 05:30)  Glu: 163(08-03 @ 05:20), 152(08-02 @ 04:44), 217(08-01 @ 05:30)    Hgb: 8.4 (08-03 @ 17:14), 7.5 (08-03 @ 05:20), 7.1 (08-02 @ 23:15), 7.6 (08-02 @ 16:39), 7.7 (08-02 @ 04:44), 8.1 (08-01 @ 05:30)  Hct: 25.1 (08-03 @ 17:14), 22.3 (08-03 @ 05:20), 21.7 (08-02 @ 23:15), 22.6 (08-02 @ 16:39), 23.1 (08-02 @ 04:44), 23.4 (08-01 @ 05:30)  WBC: 2.42 (08-03 @ 17:14), 3.18 (08-03 @ 05:20), 2.92 (08-02 @ 23:15), 3.20 (08-02 @ 16:39), 3.43 (08-02 @ 04:44), 3.25 (08-01 @ 05:30)  Plt: 59 (08-03 @ 17:14), 67 (08-03 @ 05:20), 69 (08-02 @ 23:15), 80 (08-02 @ 16:39), 78 (08-02 @ 04:44), 73 (08-01 @ 05:30)

## 2023-08-03 NOTE — PROGRESS NOTE ADULT - SUBJECTIVE AND OBJECTIVE BOX
HPI:  Osiel Norwood is a 67YO male with a past medical history of type 2 diabetes, hyperlipidemia, iron deficiency anemia, tracheal stenosis s/p tracheostomy (which was closed by ENT 7 days ago); glioblastoma s/p resection 2022, and Avastin treatment 3/2023 presenting with altered mental status and weakness x2 days. Per family, they last saw him last night around 6pm when he was at baseline. Patient has intermittent expressive aphasia but is getting worse and now is persistent, and has intermittent nausea and vomiting since last night. Patient was supposed to receive an outpatient brain MRI on 2023.  Stroke code performed in  is negative for CVA. Head CT shows new hyperdensity in frontal parietal - surgical site. MRI is ordered to rule out tumor recurrence.   Per daughter at bedside, Entresto and Eliquis were stopped per his PC - cardiologist.      (2023 13:10)    OVERNIGHT EVENTS: ANA LILIA, remains on levo for BP.     Hospital Course:   : Admitted. Na 115 in ED with repeat 118, started 3% at 30cc/hr, and salt tabs 3q6, stability CTH in ED showing Interval development of a 13 mm hyperdense nodule along the   inferior margin of the resection cavity which may represent progression   of disease with hemorrhage, repeat CTH stable, urine studies ordered  : Neuro stable, 12AM BMP Na114 3% increased to 40cc/hr, urine studies, pancx to r/o infection since pt is immunocompromised. Changed pantoprazole to sucralfate for GI ppx d/t thrombocytopenia. LE dopplers negative. DC'd 3%. ENT consulted to assess stoma, recommend follow up upon discharge with ENT, Repeat sodium 122. Restarted 3% at 30.  : ANA LILIA o/n neuro stable. remains on 3%@30cc/hr. Started florinef, increased 3% to 50cc/hr. Increased 3% to 75cc/hr.    7/10: continued current 3% rate o/n. Na 127 --> 125, cont 3% @75cc/hr, f/u repeat Na this evening, likely stepdown tomorrow. Pend dispo home with home PT/OT when able. Heme consulted for thrombocytopenia  : ANA LILIA o/n. Remains on 3% @75cc/hr. Decreased 3% to 50cc/hr. Started 1.2L fluid restriction. Repeat Na corrected 128  : ANA LILIA overnight, remains on 3%, SDU from ICU  : ANA LILIA overnight, neuro stable, on 3%@50, Am sodium 137, decreased 3% to 25cc/hr, repeat Na 138, decreased to 15cc/hr.  : ANA LILIA overnight, neuro stable, remains on 3%@15cc/hr  7/15: ANA LILIA overnight. Neuro exam stable. Pt remains on 3% saline.Sodium 134 this AM remains on 3%@25. Per nephrology recs hypertonic Na d'ceed, Na tabs 2q6, URENA 15g BID, cont florinef/ fluid restriction.Per nephrology Na >130 ok when ready for d/c   : ANA LILIA overnight, hypertonics d/c now on urea powder/1L fluid restriction/florinef and salt tabs per renal, hydrocortisone cream ordered for rash on back, rec for Home PT  : ANA LILIA overnight, following Na, renal following, ENT saw for hoarse voice rec followup with CT surgery for possible dilation, pending Home PT. Patient developed sudden onset severe headache. Hyperventilating with increased work of breathing. Wheezing in all lung fields to auscultation. Neuro intact on exam. Duoneb x1 ordered. Dilaudid 0.25 IV given for pain control. Subsequently patient developed nausea with multiple episodes of vomiting. Hypertensive with SBP in the 190s. Given hydralazine 10mg IVP, Zofran. Stroke code and rapid response called. STAT labs sent. CTH/CTA/CTP completed. Tachycardic to the 180s, consistent with SVT, remained hypertensive. Given 10 of labetalol, SVT broke. Transferred to Telemetry. WBC 23.43, lactate 8.5. Given 1L fluid bolus. Pan culture sent. Started empirically on Vanc/Meropenem.   : Put on eeg. Voiding while retaining urine, SC for 500cc. EEG +spikes, epilepsy consulted. CTA chest and CT A/P pending. Keppra increased 1g BID.   : Cont EEG, trend Na, possible NGT today if not able to tolerate PO. Blood cx growing GS + cocci in clusters, f/u MRSA swab and pan cx, cont meropenem and vancomycin. Vanc trough in am. F/u urine studies.   : Off EEG, neuro exam improved. ID following for concern for sepsis, likely aerobic blood cx bottle staph epi contaminant, cont monitoring off of abx, possible drug reaction as cause. F/u surveillance blood cx. Hyponatremia, repeat Na 129 overnight from 130, cont current regimen and f/u am labs and nephrology recs. AM na 129 continuing fluid restriction, nephro recommending restarting urena, but holding due to possible drug reaction previously.   : neurologically stable, c/w fluid restriction, f/u AM Na, urine osm, urine Na, cortisol. Given 15mg of tolvaptan. Fluid restriction, florinef d/c'd per nephro recs. Held salt tabs for today. 6pm BMP Na 122, 10pm BMP Na 131, urine osm 141  : ANA LILIA ovn, neuro stable. AM Na 133. Restarted salt tabs 3q6 and 1L fluid restriction. Lantus 10u at bedtime added. 9:30pm BMP per nephro Na 138  : ANA LILIA ovn, neuro stable. Na stable, discussed with nephrology can cont tolvaptan 15mg daily PRN for hyponatremia, only needed for hypoNa, can discontinue fluid restriction. Pt evaluated at bedside after nurse noted patient to have some expressive aphasia and perseverating; patient oriented to self, unable to say hospital or year, following all commands and DUMONT 5/5 strength, no drift, no facial droop, perseverating on pt's  when asked other questions. Pt afebrile, vitals stable, cont keppra 1g BID, cont decadron 4mg BID, discussed with Dr. Dorado, defer CTH at this time and monitor clinically. . Lantus increased to 14u at bedtime.  : o/n PSVT 13 beats followed by sinus tachycardia in setting of anxiety and net negative fluid balance.  Resolved to 102 with verbal comfort and further resolved with 500 cc NS. Lantus increased to 18u qhs. Dr. Costa consulted.  : ANA LILIA overnight. Neuro stable.   : ANA LILIA overnight. Neuro exam stable. Dispo pending.  : multiple BMs o/n. Na stable. Lexapro increased to 10mg daily for worsening depression. Held bowel regimen 2/2 multiple stools.   : ANA LILIA overnight, Na 133 f/u AM labs, pending [place]. Pt has episode of sustained tachycardia, c/o feeling warm, and dizzy while working with Pt. Given 500cc NS bolus, rectal temp was 99F. Family brought in medical marijuana. Pt had rectal temp 100.9, pancultured. Given another 1L bolus and started on maintenance fluids. Rectal temp 102. Started empirically on vanc/cipro/flagyl. Upgraded to telemetry.  : In AM patient had episode of emesis and was unresponsive to sternal rub. Dr. Huggins, neurointensivist at bedside. Repeat BP 60s/40s, tachycardic to 110s. Rapid response called. Levo gtt started. Upgraded to ICU. Intubated for airway protection. Likely septic shock given GNR in blood cx growing K.pneumoniae, Given additional 1L bolus x2. Abx changed to vanc and meropenem, ID following, rec to d/c vanc, c/w meropenem 3ml9eol, FOBT positive, keppra changed to vimpat 50BID, fentanyl 25mg q4hrs prn for vent dysynchrony, home lexapro decreased to 5mg, decadron decreased to 2q12, sucralfate increased to 1q6 iso GI bleed, pending repeat CBC, CMP, ABG, DIC labs, xray abdomen to r/o obstruction, propofol gtt started for sedation, weaning off precedex gtt, GI consulted rec protonix 40bid iso possible GI bleed, plan for possible EGD, albumin 1.7, 25% albumin 50ml given, s/p 1u PRBC and PLT repeat cbc hgb 8.0 from 6.6 and PLT 88 from 55. Protonix gtt started per GI recs.   : ANA LILIA overnight. Neuro stable. Hemoglobin 6.5, platelets 47 o/n, transfused 1uPRBC, 1u platelets in AM. Repeat Hb 7.1 and platelets 81. CBC repeated and Hb 7.2 and platelets 74. Arnie test negative. 1 set of surveillance blood cultures sent. NS increased to 120cc/hr. Holding off on CT C/A/P per nephrology recs due to concern about IV contrast. Temp 100.8 F, given tylenol. Additional 1u PRBC given in evening for Hb 6.8.   : 1u prbc given in am. 6 units lantus started at bedtime. Per ID stopped meropenam and started ceftriaxone 1qd and flagyl 500 q8.  Plan for EGD tomorrow with GI.   : Tachycardic, gave 20 IV lasix. Hgb at the time stable. NS@20 for renal protection. lantus increased to 14u. Lactate normalized. Given 500cc bolus for soft pressures, restarted levo gtt temporarily, now off. Endoscopy done with GI showed severe circumferential esophagitis, small ulcer to anterior stomach. propofol changed to precedex.   : ANA LILIA overnight, remains sedated on precedex and intubated on ACVC.  Pt tolerating CPAP. Campuzano removed, failed TOV, and straight catheterized. Trickle feeds started 10cc/hr. Changed protonix gtt transitioned to 40mg IV BID per GI. ENT consulted for trach planning.  8/3: ANA LILIA overnight, remains on levo. SC prn.     Vital Signs Last 24 Hrs  T(C): 37.7 (03 Aug 2023 01:08), Max: 38.7 (02 Aug 2023 17:43)  T(F): 99.9 (03 Aug 2023 01:08), Max: 101.7 (02 Aug 2023 17:43)  HR: 83 (03 Aug 2023 03:00) (83 - 113)  BP: 111/63 (03 Aug 2023 03:00) (99/54 - 119/70)  BP(mean): 82 (03 Aug 2023 03:00) (69 - 90)  RR: 16 (03 Aug 2023 02:00) (14 - 20)  SpO2: 100% (03 Aug 2023 03:00) (98% - 100%)    Parameters below as of 03 Aug 2023 03:00  Patient On (Oxygen Delivery Method): ventilator, CPAP    O2 Concentration (%): 40    I&O's Summary    01 Aug 2023 07:01  -  02 Aug 2023 07:00  --------------------------------------------------------  IN: 1506 mL / OUT: 2500 mL / NET: -994 mL    02 Aug 2023 07:01  -  03 Aug 2023 04:10  --------------------------------------------------------  IN: 998.8 mL / OUT: 1950 mL / NET: -951.2 mL        PHYSICAL EXAM:  General: patient seen laying supine in bed in NAD  Neuro: OE to noxious, RUE localizes, LUE and b/l LE w/d, +cough/gag  Neck: supple  Cardiac: RRR, S1S2  Pulmonary: chest rise symmetric  Abdomen: soft, nontender, nondistended  Ext: perfusing well, +petechiae abdomen, b/l extremities and scrotum   Skin: warm, dry      TUBES/LINES:  [] Campuzano  [] Lumbar Drain  [] Wound Drains  [] Others      DIET:  [] NPO  [] Mechanical  [x] Tube feeds - NGT     LABS:                        7.1    2.92  )-----------( 69       ( 02 Aug 2023 23:15 )             21.7     08-02    141  |  112<H>  |  16  ----------------------------<  152<H>  3.4<L>   |  25  |  0.56    Ca    7.1<L>      02 Aug 2023 04:44  Phos  2.6     08-02  Mg     1.6     08-02    TPro  4.2<L>  /  Alb  2.2<L>  /  TBili  1.0  /  DBili  x   /  AST  21  /  ALT  15  /  AlkPhos  53  08-01      Urinalysis Basic - ( 02 Aug 2023 04:44 )    Color: x / Appearance: x / SG: x / pH: x  Gluc: 152 mg/dL / Ketone: x  / Bili: x / Urobili: x   Blood: x / Protein: x / Nitrite: x   Leuk Esterase: x / RBC: x / WBC x   Sq Epi: x / Non Sq Epi: x / Bacteria: x      CARDIAC MARKERS ( 01 Aug 2023 05:30 )  x     / x     / 67 U/L / x     / x          CAPILLARY BLOOD GLUCOSE      POCT Blood Glucose.: 186 mg/dL (03 Aug 2023 00:28)  POCT Blood Glucose.: 176 mg/dL (02 Aug 2023 21:58)  POCT Blood Glucose.: 112 mg/dL (02 Aug 2023 18:31)  POCT Blood Glucose.: 178 mg/dL (02 Aug 2023 12:15)  POCT Blood Glucose.: 169 mg/dL (02 Aug 2023 07:43)      Drug Levels: [] N/A    CSF Analysis: [] N/A      Allergies    amoxicillin (Rash)  ure-Na (Rash; Fever; Hypotension)    Intolerances      MEDICATIONS:  Antibiotics:  cefTRIAXone   IVPB 1000 milliGRAM(s) IV Intermittent every 24 hours  metroNIDAZOLE  IVPB      metroNIDAZOLE  IVPB 500 milliGRAM(s) IV Intermittent every 8 hours    Neuro:  acetaminophen     Tablet .. 650 milliGRAM(s) Oral every 6 hours PRN  dexMEDEtomidine Infusion 0.2 MICROgram(s)/kG/Hr IV Continuous <Continuous>  escitalopram 5 milliGRAM(s) Oral daily  fentaNYL    Injectable 25 MICROGram(s) IV Push every 2 hours PRN  lacosamide IVPB 50 milliGRAM(s) IV Intermittent every 12 hours  ondansetron Injectable 4 milliGRAM(s) IV Push every 6 hours PRN    Anticoagulation:    OTHER:  atorvastatin 20 milliGRAM(s) Oral at bedtime  chlorhexidine 0.12% Liquid 15 milliLiter(s) Oral Mucosa every 12 hours  chlorhexidine 2% Cloths 1 Application(s) Topical <User Schedule>  dexAMETHasone  Injectable 2 milliGRAM(s) IV Push every 12 hours  fluticasone propionate 50 MICROgram(s)/spray Nasal Spray 1 Spray(s) Both Nostrils two times a day  insulin glargine Injectable (LANTUS) 14 Unit(s) SubCutaneous at bedtime  insulin lispro (ADMELOG) corrective regimen sliding scale   SubCutaneous every 6 hours  norepinephrine Infusion 0.05 MICROgram(s)/kG/Min IV Continuous <Continuous>  pantoprazole  Injectable 40 milliGRAM(s) IV Push every 12 hours  sucralfate suspension 1 Gram(s) Oral every 6 hours    IVF:  multivitamin 1 Tablet(s) Oral daily  sodium chloride 3 Gram(s) Oral every 6 hours    CULTURES:  Culture Results:   Rare to few Klebsiella pneumoniae  Accompanied by normal respiratory lesa ( @ 17:19)  Culture Results:   No growth at 2 days. ( @ 09:02)      ASSESSMENT:  69 YO male PMH T2DM, HLD, JAGDISH, tracheal stenosis s/p tracheostomy (s/p closure); glioblastoma s/p resection 2022, and [place] Avastin treatment 3/2023 adm with aphasia, N/V and severe hypoNa (118), imaging showing disesase progression, Hospital course complicated by sepsis secondary to bacteremia, decompensated, intubated, started on antibiotics and upgrated to ICU on .       PLAN:  Neuro:  -neuro q4h/vitals q1hrs   -CTH : Interval development of a 13 mm hyperdense nodule along the inferior margin of the resection cavity which may represent progression of disease with hemorrhage. Repeat CTH stable, CTH  stable   -CTA : negative, CTA  stable  -CTP  stable.   -pain control prn, fentanyl IVP prn  -vimpat 50mg BID (switched from keppra  thrombocytopenia) for seizure tx  -Decadron 2mg BID  -c/w home Lexapro 5mg (home dose 5mg)  -home ASA 81 held i/s/o thrombocytopenia   -MRI brain  suspicious for recurrent tumor and hydro   - palliative following   - precedex gtt for sedation    Pulm:  -VC/AC 14/400/40/5    Cardio:  - MAP goal >65, levo gtt prn  -  outpatient echo EF 65%  - echo : EF 60-65%    GI:  - NGT tube   - bowel regimen held for loose stools, LBM   - protonix BID for possible GI bleed  - sucralfate 1q6 while on steroids  - GI following  - EGD : severe circumferential esophagitis, small ulcer to anterior stomach    Renal/:  - Hyponatremic (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 as well as SSRI use), s/p tolvaptan ; currently on NaCl tabs 3g q6h  - Trend BMP  - IVL  - Campuzano replaced     Endo:  -A1c 5.7  -lantus 14u qhs, ISS  -h/o T2DM: home Januvia held  -h/o HLD: c/w Atorvastatin 20mg    Heme:  -SCDs for DVT ppx, SQL and Aspirin 81 held for thrombocytopenia   -heme consulted for thrombocytopenia- w/u sent, likely chronic thrombocytopenia d/t chemotherapy agent in past   -Per heme transfuse 1U plt if bleeding and platelet count <50k, if febrile and platelet count <20k  -1u PRBC, 1U PLT , 2u PRBC, 1u PLT   -LE dopplers  negative  -LUE doppler : L superficial cephalic vein thrombophlebitis  -CTA PE protocol for tachycardia: negative     ID:  - Pancultured , bcx growing K. pneumoniae, sputum cx K. pneumoniae, f/u blood cx   - meropenem 1g q8hrs (-), changed to Ceftriaxone/Flagyl per ID recs (- )  - CT CAP : Wall thickening in the colon suggesting an infectious or inflammatory colitis, Suspected small duodenal ulcer with surrounding edema in, Distended gallbladder w/o cholecystitis. Mild ascites. Bibasilar pneumonia L>R.  -Febrile, leukocytosis and tachycardia with elevated lactic acid on   -vancomycin/meropenem empirically (-), d/c per ID and repeat blood cx , NGTD    Dispo: NSICU status, full code, AR    D/w Dr. Dorado    Assessment:  Present when checked    []  GCS  E   V  M     Heart Failure: []Acute, [] acute on chronic , []chronic  Heart Failure:  [] Diastolic (HFpEF), [] Systolic (HFrEF), []Combined (HFpEF and HFrEF), [] RHF, [] Pulm HTN, [] Other    [] EUGENIA, [] ATN, [] AIN, [] other  [] CKD1, [] CKD2, [] CKD 3, [] CKD 4, [] CKD 5, []ESRD    Encephalopathy: [] Metabolic, [] Hepatic, [] toxic, [] Neurological, [] Other    Abnormal Nurtitional Status: [] malnurtition (see nutrition note), [ ]underweight: BMI < 19, [] morbid obesity: BMI >40, [] Cachexia    [] Sepsis  [] hypovolemic shock,[] cardiogenic shock, [] hemorrhagic shock, [] neuogenic shock  [] Acute Respiratory Failure  []Cerebral edema, [] Brain compression/ herniation,   [] Functional quadriplegia  [] Acute blood loss anemia

## 2023-08-03 NOTE — PROGRESS NOTE ADULT - ASSESSMENT
ASSESSMENT:   69 y/o M with septic shock secondary to Klebsiella bacteremia   GBM s/p resection, recent chemotherapy  History of Takotsubo cardiomyopathy  Chronic SIADH  Type 2 DM    NEURO:  Neurochecks Q4  Analgesia: Fentanyl prn  Sedation: Precedex prn RASS 0- neg 1  Seizure history: Continue vimpat  Cerebral edema: On dexamethasone 2mg Q12  Activity: Bedrest for now. PT/OT evaluation when stable   Palliative care following    PULMONARY: Currently requiring mechanical ventilation for airway protection   Vent settings 14/400/40/5- CPAP as tolerated  CXR, ABG  VAP bundle  Will need trach; ENT consulted     CARDIOVASCULAR: Hypotension in setting of likely septic shock and acute blood loss anemia 2/2 suspected GIB  MAP goal 65-75. On low dose levophed  TTE: EF 60-65%. No WMA, no vegetation  POCUS 8/3  Lactate    GASTROENTEROLOGY: Suspected GI bleed, fecal occult positive.   NPO, S/P trickle feeds 8/2 though high residuals. AXR to look for ileus  GI consulted for endoscopy evaluation- 8/1 scope showed esophagitis and non-bleeding ulcer  GI ppx: On protonix gtt-> change to bid. Continue sucralfate  CT chest/ abd/ pelvis ? small ulcer at first portion of duodenum  Will need PEG    RENAL/: Dark urine; ?myoglobin vs hematuria vs bilirubinemia. Chronic SIADH  Monitor BMP daily  Campuzano; monitor Is/Os  Na goal 135-145; replete lytes   Urology following    ENDOCRINE: Diabetes Mellitus  A1c- 5.7  ISS while NPO  Lantus 14u qhs    HEME/ONC: Pancytopenia likely chemo-induced, JAGDISH.  Concern for GIB: Trend H/H and plt. Hb goal >7.0, plt goal >80  Transfuse 1 unit platelets  DVT ppx: will hold chemical DVT ppx in setting of low platelets/ bleed  B/L SCDs  Heme consult    INFECTIOUS: Klebsiella bacteremia ?source- possible translocation  S/P meropenem-> CTX and flagyl (?duration).  ID following  Repeat blood cultures 7/31 and daily. NGTD from 7/30 and 7/31  Monitor for fevers- if spikes 8/3, will panculture    MISC:  Palliative care following, appreciate recommendations    SOCIAL/FAMILY:  [] awaiting [x] updated daughter and son-in- law at bedside [] family meeting    CODE STATUS:  [x] Full Code [] DNR [] DNI [] Palliative/Comfort Care    DISPOSITION:  [x] ICU [] Stroke Unit [] Floor [] EMU [] RCU [] PCU    Time spent: 60 critical care minutes

## 2023-08-03 NOTE — PROGRESS NOTE ADULT - SUBJECTIVE AND OBJECTIVE BOX
INFECTIOUS DISEASES CONSULT FOLLOW-UP NOTE    INTERVAL HPI/OVERNIGHT EVENTS:      ROS:   Constitutional, eyes, ENT, cardiovascular, respiratory, gastrointestinal, genitourinary, integumentary, neurological, psychiatric and heme/lymph are otherwise negative other than noted above       ANTIBIOTICS/RELEVANT:    MEDICATIONS  (STANDING):  atorvastatin 20 milliGRAM(s) Oral at bedtime  cefTRIAXone   IVPB 1000 milliGRAM(s) IV Intermittent every 24 hours  chlorhexidine 0.12% Liquid 15 milliLiter(s) Oral Mucosa every 12 hours  chlorhexidine 2% Cloths 1 Application(s) Topical <User Schedule>  dexAMETHasone  Injectable 2 milliGRAM(s) IV Push every 12 hours  escitalopram 5 milliGRAM(s) Oral daily  insulin glargine Injectable (LANTUS) 14 Unit(s) SubCutaneous at bedtime  insulin lispro (ADMELOG) corrective regimen sliding scale   SubCutaneous every 6 hours  lacosamide IVPB 50 milliGRAM(s) IV Intermittent every 12 hours  metroNIDAZOLE  IVPB      metroNIDAZOLE  IVPB 500 milliGRAM(s) IV Intermittent every 8 hours  multivitamin 1 Tablet(s) Oral daily  norepinephrine Infusion 0.05 MICROgram(s)/kG/Min (6.81 mL/Hr) IV Continuous <Continuous>  pantoprazole  Injectable 40 milliGRAM(s) IV Push every 12 hours  sodium chloride 3 Gram(s) Oral every 6 hours  sucralfate suspension 1 Gram(s) Oral every 6 hours    MEDICATIONS  (PRN):  acetaminophen     Tablet .. 650 milliGRAM(s) Oral every 6 hours PRN Temp greater or equal to 38C (100.4F), Mild Pain (1 - 3)  fentaNYL    Injectable 25 MICROGram(s) IV Push every 2 hours PRN vent dysynchrony  ondansetron Injectable 4 milliGRAM(s) IV Push every 6 hours PRN Nausea and/or Vomiting        Vital Signs Last 24 Hrs  T(C): 37.2 (03 Aug 2023 08:56), Max: 38.7 (02 Aug 2023 17:43)  T(F): 99 (03 Aug 2023 08:56), Max: 101.7 (02 Aug 2023 17:43)  HR: 91 (03 Aug 2023 10:00) (83 - 113)  BP: 93/51 (03 Aug 2023 10:00) (85/53 - 119/67)  BP(mean): 66 (03 Aug 2023 10:00) (65 - 88)  RR: 16 (03 Aug 2023 10:00) (14 - 20)  SpO2: 100% (03 Aug 2023 10:00) (98% - 100%)    Parameters below as of 03 Aug 2023 10:00  Patient On (Oxygen Delivery Method): ventilator, CPAP    O2 Concentration (%): 40    08-02-23 @ 07:01  -  08-03-23 @ 07:00  --------------------------------------------------------  IN: 1281.6 mL / OUT: 1950 mL / NET: -668.4 mL    08-03-23 @ 07:01  -  08-03-23 @ 11:00  --------------------------------------------------------  IN: 15 mL / OUT: 810 mL / NET: -795 mL      PHYSICAL EXAM:  Constitutional: alert, NAD  Eyes: the sclera and conjunctiva were normal.   ENT: the ears and nose were normal in appearance.   Neck: the appearance of the neck was normal and the neck was supple.   Pulmonary: no respiratory distress and lungs were clear to auscultation bilaterally.   Heart: heart rate was normal and rhythm regular, normal S1 and S2  Vascular:. there was no peripheral edema  Abdomen: normal bowel sounds, soft, non-tender  Neurological: no focal deficits.   Psychiatric: the affect was normal        LABS:                        7.5    3.18  )-----------( 67       ( 03 Aug 2023 05:20 )             22.3     08-03    139  |  108  |  18  ----------------------------<  163<H>  3.6   |  24  |  0.47<L>    Ca    7.4<L>      03 Aug 2023 05:20  Phos  2.9     08-03  Mg     1.8     08-03        Urinalysis Basic - ( 03 Aug 2023 05:20 )    Color: x / Appearance: x / SG: x / pH: x  Gluc: 163 mg/dL / Ketone: x  / Bili: x / Urobili: x   Blood: x / Protein: x / Nitrite: x   Leuk Esterase: x / RBC: x / WBC x   Sq Epi: x / Non Sq Epi: x / Bacteria: x        MICROBIOLOGY:      RADIOLOGY & ADDITIONAL STUDIES:  Reviewed INFECTIOUS DISEASES CONSULT FOLLOW-UP NOTE    INTERVAL HPI/OVERNIGHT EVENTS:    Patient seen and examined at bedside. ANA LILIA. Intubated and sedated, on pressors. Unable to obtain ROS. Patient had NGT placed yesterday. Failed TOV- has new indwelling cath.       ROS:   Constitutional, eyes, ENT, cardiovascular, respiratory, gastrointestinal, genitourinary, integumentary, neurological, psychiatric and heme/lymph are otherwise negative other than noted above       ANTIBIOTICS/RELEVANT:    MEDICATIONS  (STANDING):  atorvastatin 20 milliGRAM(s) Oral at bedtime  cefTRIAXone   IVPB 1000 milliGRAM(s) IV Intermittent every 24 hours  chlorhexidine 0.12% Liquid 15 milliLiter(s) Oral Mucosa every 12 hours  chlorhexidine 2% Cloths 1 Application(s) Topical <User Schedule>  dexAMETHasone  Injectable 2 milliGRAM(s) IV Push every 12 hours  escitalopram 5 milliGRAM(s) Oral daily  insulin glargine Injectable (LANTUS) 14 Unit(s) SubCutaneous at bedtime  insulin lispro (ADMELOG) corrective regimen sliding scale   SubCutaneous every 6 hours  lacosamide IVPB 50 milliGRAM(s) IV Intermittent every 12 hours  metroNIDAZOLE  IVPB      metroNIDAZOLE  IVPB 500 milliGRAM(s) IV Intermittent every 8 hours  multivitamin 1 Tablet(s) Oral daily  norepinephrine Infusion 0.05 MICROgram(s)/kG/Min (6.81 mL/Hr) IV Continuous <Continuous>  pantoprazole  Injectable 40 milliGRAM(s) IV Push every 12 hours  sodium chloride 3 Gram(s) Oral every 6 hours  sucralfate suspension 1 Gram(s) Oral every 6 hours    MEDICATIONS  (PRN):  acetaminophen     Tablet .. 650 milliGRAM(s) Oral every 6 hours PRN Temp greater or equal to 38C (100.4F), Mild Pain (1 - 3)  fentaNYL    Injectable 25 MICROGram(s) IV Push every 2 hours PRN vent dysynchrony  ondansetron Injectable 4 milliGRAM(s) IV Push every 6 hours PRN Nausea and/or Vomiting        Vital Signs Last 24 Hrs  T(C): 37.2 (03 Aug 2023 08:56), Max: 38.7 (02 Aug 2023 17:43)  T(F): 99 (03 Aug 2023 08:56), Max: 101.7 (02 Aug 2023 17:43)  HR: 91 (03 Aug 2023 10:00) (83 - 113)  BP: 93/51 (03 Aug 2023 10:00) (85/53 - 119/67)  BP(mean): 66 (03 Aug 2023 10:00) (65 - 88)  RR: 16 (03 Aug 2023 10:00) (14 - 20)  SpO2: 100% (03 Aug 2023 10:00) (98% - 100%)    Parameters below as of 03 Aug 2023 10:00  Patient On (Oxygen Delivery Method): ventilator, CPAP    O2 Concentration (%): 40    08-02-23 @ 07:01  -  08-03-23 @ 07:00  --------------------------------------------------------  IN: 1281.6 mL / OUT: 1950 mL / NET: -668.4 mL    08-03-23 @ 07:01  -  08-03-23 @ 11:00  --------------------------------------------------------  IN: 15 mL / OUT: 810 mL / NET: -795 mL      PHYSICAL EXAM:  Constitutional: alert, NAD  Eyes: the sclera and conjunctiva were normal.   ENT: the ears and nose were normal in appearance. Intubated, NGT  Neck: the appearance of the neck was normal and the neck was supple.   Pulmonary: no respiratory distress and lungs were clear to auscultation bilaterally.   Heart: heart rate was normal and rhythm regular, normal S1 and S2  Vascular:. there was no peripheral edema  Abdomen: normal bowel sounds, soft, non-tender  Neurological: no focal deficits.   Psychiatric: the affect was normal        LABS:                        7.5    3.18  )-----------( 67       ( 03 Aug 2023 05:20 )             22.3     08-03    139  |  108  |  18  ----------------------------<  163<H>  3.6   |  24  |  0.47<L>    Ca    7.4<L>      03 Aug 2023 05:20  Phos  2.9     08-03  Mg     1.8     08-03        Urinalysis Basic - ( 03 Aug 2023 05:20 )    Color: x / Appearance: x / SG: x / pH: x  Gluc: 163 mg/dL / Ketone: x  / Bili: x / Urobili: x   Blood: x / Protein: x / Nitrite: x   Leuk Esterase: x / RBC: x / WBC x   Sq Epi: x / Non Sq Epi: x / Bacteria: x        MICROBIOLOGY:  reviewed     RADIOLOGY & ADDITIONAL STUDIES:  Reviewed

## 2023-08-03 NOTE — CHART NOTE - NSCHARTNOTEFT_GEN_A_CORE
Primary team called regarding candidacy for PEG. His recent EGD showed severe esophagitis w/ recent bleeding and is currently on pressors, both of which make him not a candidate currently for PEG. Our PEGs are done by pulling the PEG tube through the mouth and esophagus into the stomach which could cause tears and bleeding with his esophagitis, thus we would need to see mucosal healing on EGD prior to PEG placement which could potentially take weeks given his state of health. If he is stable (ie. off pressors) and needs a PEG sooner would consider consulting another service which is able to place PEG in a manner that has lower risk of esophageal mucosal damage.    Above discussed with attending Dr. Bourgeois.    Vaughn ([facility]) MD [place]  Gastroenterology Fellow  Pager (M-F 7a-5p): [phone]  Pager (after hours): Please call  for on-call fellow

## 2023-08-03 NOTE — PROGRESS NOTE ADULT - ASSESSMENT
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  He was medically managed.  Course c/b septic shock due to K.pneumo bacteremia, patient has +FOBT, Hgb drop from 9 to 6.7, and billous vomiting, suspect due to gut translocation in setting of GIB.  CT C/A/P with ? infectious vs inflammatory colitis. Patient not having diarrhea. Kleb pneumo sensitive to CTX on susceptibilities. EGD 8/1 showed erosive esophagitis- per GI, anemia/emesis likely 2/2 exacerbation of esophagitis. Temp to 101.7 last night -NGT placed and was straight catheterized yesterday- c/f another translocation vs UTI.     Suggest:  -F/u Bcxs 7/30 and 7/31  -Please obtain stat blood cultures and UA with reflex to culture   -cont CTX 1g IV q24h (coverage of Kleb pneumo)  -cont Flagyl 500 mg IV Q8h (to add coverage of other anaerobes)       Team 2 will follow you.    Case d/w primary team.  Final recommendation pending attending note.    Kelsey Jolley, Infectious Diseases PA  Please reach out for any questions 9 am-5pm. For evenings and weekends, please call the ID physician on call.  Work cell: [phone]

## 2023-08-03 NOTE — PROCEDURE NOTE - NSINFORMCONSENT_GEN_A_CORE
Benefits, risks, and possible complications of procedure explained to patient/caregiver who verbalized understanding and gave verbal consent.
Benefits, risks, and possible complications of procedure explained to patient/caregiver who verbalized understanding and gave written consent.
urinary retention/This was an emergent procedure.
Benefits, risks, and possible complications of procedure explained to patient/caregiver who verbalized understanding and gave verbal consent.
Benefits, risks, and possible complications of procedure explained to patient/caregiver who verbalized understanding and gave verbal consent.

## 2023-08-03 NOTE — PROGRESS NOTE ADULT - ASSESSMENT
68M with pmhx of T2DM, HLD, JAGDISH, hyponatremia, tracheal stenosis s/p tracheostomy, and glioblastoma s/p cerebral angiogram with IA Avastin treatment in 3/2023 admitted for AMS and weakness, found to have expressive aphasia and imaging findings concerning for tumor recurrence at surgical site, currently admitted to NSICU d/t need for pressor support i/s/o septic shock likely 2/2 K. pneumoniae. Hem/onc consulted for low PLT on 07/10, re-consulted on 08/03 for persistently low PLT    #Grade 2-3 Thrombocytopenia, Plt flores 47k, now improved at 67k  Pt has had a history of thrombocytopenia with his baseline range  since the latter half of last year.   He was previously on temozolomide and RT (completed treatment in 12/2022), which coincides with his noted thrombocytopenia. He received Avastin on 3/7, and had a flores of 63k. He hasn't received any Avastin on this admission and his flores now is 69k  Peripheral slide reviewed - showing 5-6 PLTs/hpf, occasional large PLTs and PLT clumbing, and 1-2 schistocytes per hpf  Plasmic score 3 pts, low risk of TTP  4Ts score 0, low probability of HIT  Patient recovering from episode of septic shock, on multiple abx and pressors including Decadron  No clinical signs of DIC on exam, and labs support this.  Transfuse 1U plt if bleeding and platelet count <50k, if febrile and platelet count <20k,     #anemia, hemoglobin 7.5  Pt previously found to be iron deficient and given iron sucrose 300mg x 3 in 3/2023    Pt's thrombocytopenia likely multifactorial, and may include i) septic shock, ii) medications (received vanc for septic shock, currently on Decadron as a pressor), and iii) continued baseline since chemoradiation (prior treatment for known GBM (temozolamide + chemoradiation, low PLT correlating with time of initiation of treatment). Hepatitis serologies were reviewed, indicative of prior infection.  NOTE in progress 68 M with pmhx of T2DM, HLD, JAGDISH, hyponatremia, tracheal stenosis s/p tracheostomy, and glioblastoma (s/p cerebral angiogram with IA Avastin treatment in 3/2023) admitted for AMS and weakness, found to have expressive aphasia and imaging findings concerning for tumor recurrence at surgical site. Hospital course complicated by septic shock likely 2/2 K. pneumoniae and possible upper GI bleeding. Hematology consulted for pancytopenia.    Platelet trend from July 26th: 100 (7/26)-> 88 (7/27)-> 68 (7/28)-> 87 (7/28)->52/55 (7/29)-> 1 unit platelets -> 88 (7/28)-> 47 (7/30)-> 81 (7/30)-> 55 (7/30)-> 1 unit platelets-> 78 (7/30)-> 1 unit platelets-> 117 (7/31)-> 88 (7/31)-> 73 (8/1)-> 78 (8/2)-> 69 (8/2)-> 67 (8/3)    Peripheral smear review by fellow 8/3/23: moderate anisocytosis, occasional teardrop, slight 1-2 schistocyte per HPF, large and giant platelets present, 1-2 areas of mild platelet clumping noted    # Thrombocytopenia, moderate  Pt has had a history of thrombocytopenia with his baseline range  since the latter half of last year.   He was previously on temozolomide and RT (completed treatment in 12/2022), which coincides with his noted thrombocytopenia. He received Avastin on 3/7, and had a flores of 63k. He hasn't received any Avastin on this admission. His platelet count this admission has ranged from 60s-100s until July 29th. Patient with klebsiella PNA bacteremia diagnosed on 7/28. Patient received vanc, flagyl, ciprofloxacin, ceftriaxone, and meropenem for septic shock, currently on ceftriaxone/flagyl. Patient with possible coffee ground emesis 7/29 and acute drop in hemoglobin to 6.7 as well as a drop in platelets. Post-platelet transfusion show appropriate response and no evidence for platelet refractoriness. Slight schistocytes on smear, but haptoglobin 127 and  argue against hemolysis. Direct jarret negative. Suspect current episode of thrombocytopenia is multifactorial including consumptive in the setting of GI bleed and marrow suppression in the setting of broad spectrum antibiotics, bacteremia/sepsis, and critical illness. Cannot exclude chronic marrow suppression from past chemotherapy exposure or current nutritional deficit (albumin 2.2).  - Daily CBC with differential  - Maintain active type and screen  - Repeat folate and B12  - Would expect slight improvement in platelet count as GI bleed/infection resolve, but may be blunted by underlying inflammation and medications. Would continue with supportive transfusions for platelets <10k, <20k with fever, or <50k with active bleeding    # Anemia, normocytic  Review of prior admission show, hemoglobin 12-15 in January 2022 and hemoglobin past few months 10-12. On admission to [facility] on 7/7/23, Hgb 12.9 MCV 93. Patient with possible coffee ground emesis 7/29 and acute drop in hemoglobin to 6.7, bedside EGD findings of severe erosive esophagitis, small non bleeding stomach ulcer, and friable mucosa in duodenum. Per GI, severe erosive esophagitis likely cause of current anemia. Iron saturation 33% and ferritin 674, suggest degree of anemia of chronic inflammation contributing. Reticulocyte count 5.3%, but reticulocyte index 1.34 suggests overall hypoproliferation. Current anemia is likely multifactorial in the setting of severe erosive esophagitis, broad spectrum antibiotics, infection, and critical illness.  - Daily CBC with differential  - Maintain active type and screen  - Transfuse for Hgb <7 or active bleeding  - Appreciate GI recommendations    # Leukopenia, mild  Slight decline in WBC count around 7/30, no neutropenia noted on 8/1. Likely multifactorial in the setting of severe erosive esophagitis, broad spectrum antibiotics, infection, and critical illness.  - Infectious work up and management per primary team    Discussed with hematology oncology attending Dr. Emi Del Rio. Recommendations are considered final after attending attestation.  68 M with pmhx of T2DM, HLD, JAGDISH, hyponatremia, tracheal stenosis s/p tracheostomy, and glioblastoma (s/p cerebral angiogram with IA Avastin treatment in 3/2023) admitted for AMS and weakness, found to have expressive aphasia and imaging findings concerning for tumor recurrence at surgical site. Hospital course complicated by septic shock likely 2/2 K. pneumoniae and possible upper GI bleeding. Hematology consulted for pancytopenia.    Platelet trend from July 26th: 100 (7/26)-> 88 (7/27)-> 68 (7/28)-> 87 (7/28)->52/55 (7/29)-> 1 unit platelets -> 88 (7/28)-> 47 (7/30)-> 81 (7/30)-> 55 (7/30)-> 1 unit platelets-> 78 (7/30)-> 1 unit platelets-> 117 (7/31)-> 88 (7/31)-> 73 (8/1)-> 78 (8/2)-> 69 (8/2)-> 67 (8/3)    Peripheral smear review by fellow 8/3/23: moderate anisocytosis, occasional teardrop, slight 1-2 schistocyte per HPF, large and giant platelets present, 1-2 areas of mild platelet clumping noted    # Thrombocytopenia, moderate  Pt has had a history of thrombocytopenia with his baseline range  since the latter half of last year.   He was previously on temozolomide and RT (completed treatment in 12/2022), which coincides with his noted thrombocytopenia. He received Avastin on 3/7, and had a flores of 63k. He hasn't received any Avastin on this admission. His platelet count this admission has ranged from 60s-100s until July 29th. Patient with klebsiella PNA bacteremia diagnosed on 7/28. Patient received vanc, flagyl, ciprofloxacin, ceftriaxone, and meropenem for septic shock, currently on ceftriaxone/flagyl. Patient with possible coffee ground emesis 7/29 and acute drop in hemoglobin to 6.7 as well as a drop in platelets. Post-platelet transfusion show appropriate response and no evidence for platelet refractoriness. Slight schistocytes on smear, but haptoglobin 127 and  argue against hemolysis (Plasmic score 3 pts, low risk of TTP). Direct jarret negative. Suspect current episode of thrombocytopenia is multifactorial including consumptive in the setting of GI bleed and marrow suppression in the setting of broad spectrum antibiotics, bacteremia/sepsis, and critical illness. Cannot exclude chronic marrow suppression from past chemotherapy exposure, current nutritional deficit (albumin 2.2), or viral reactivation (Hep B Core ab total, pending viral load).  - Daily CBC with differential  - Maintain active type and screen  - Repeat folate and B12  - Hepatitis B Quantitative  - Would expect slight improvement in platelet count as GI bleed/infection resolve, but may be blunted by underlying inflammation and medications. Would continue with supportive transfusions for platelets <10k, <20k with fever, or <50k with active bleeding    # Anemia, normocytic  Review of prior admission show, hemoglobin 12-15 in January 2022 and hemoglobin past few months 10-12. On admission to [facility] on 7/7/23, Hgb 12.9 MCV 93. Patient with possible coffee ground emesis 7/29 and acute drop in hemoglobin to 6.7, bedside EGD findings of severe erosive esophagitis, small non bleeding stomach ulcer, and friable mucosa in duodenum. Per GI, severe erosive esophagitis likely cause of current anemia. Iron saturation 33% and ferritin 674, suggest degree of anemia of chronic inflammation contributing. Reticulocyte count 5.3%, but reticulocyte index 1.34 suggests overall hypoproliferation. Current anemia is likely multifactorial in the setting of severe erosive esophagitis, broad spectrum antibiotics, infection, and critical illness.  - Daily CBC with differential  - Maintain active type and screen  - Transfuse for Hgb <7 or active bleeding  - Appreciate GI recommendations    # Leukopenia, mild  Slight decline in WBC count around 7/30, no neutropenia noted on 8/1. Likely multifactorial in the setting of severe erosive esophagitis, broad spectrum antibiotics, infection, and critical illness.  - Infectious work up and management per primary team    Discussed with hematology oncology attending Dr. Emi Del Rio. Recommendations are considered final after attending attestation.  68 M with pmhx of T2DM, HLD, JAGDISH, hyponatremia, tracheal stenosis s/p tracheostomy, and glioblastoma (s/p cerebral angiogram with IA Avastin treatment in 3/2023) admitted for AMS and weakness, found to have expressive aphasia and imaging findings concerning for tumor recurrence at surgical site. Hospital course complicated by septic shock likely 2/2 K. pneumoniae and possible upper GI bleeding. Hematology consulted for pancytopenia.    Platelet trend from July 26th: 100 (7/26)-> 88 (7/27)-> 68 (7/28)-> 87 (7/28)->52/55 (7/29)-> 1 unit platelets -> 88 (7/28)-> 47 (7/30)-> 81 (7/30)-> 55 (7/30)-> 1 unit platelets-> 78 (7/30)-> 1 unit platelets-> 117 (7/31)-> 88 (7/31)-> 73 (8/1)-> 78 (8/2)-> 69 (8/2)-> 67 (8/3)    Peripheral smear review by fellow 8/3/23: moderate anisocytosis, occasional teardrop, slight 1-2 schistocyte per HPF, large and giant platelets present, 1-2 areas of mild platelet clumping noted    # Thrombocytopenia, moderate  Pt has had a history of thrombocytopenia with his baseline range  since the latter half of last year. He was previously on temozolomide and RT (completed treatment in 12/2022), which coincides with his noted thrombocytopenia. He received Avastin on 3/7, and had a flores of 63k. He hasn't received any Avastin on this admission. His platelet count this admission has ranged from 60s-100s until July 29th. Patient with klebsiella PNA bacteremia diagnosed on 7/28. Patient received vanc, flagyl, ciprofloxacin, ceftriaxone, and meropenem for septic shock, currently on ceftriaxone/flagyl. Patient with possible coffee ground emesis 7/29 and acute drop in hemoglobin to 6.7 as well as a drop in platelets. Post-platelet transfusion show appropriate response and no evidence for platelet refractoriness. Slight schistocytes on smear, but haptoglobin 127 and  argue against hemolysis (Plasmic score 3 pts, low risk of TTP). Direct jarret negative. Suspect current episode of thrombocytopenia is multifactorial including consumptive in the setting of GI bleed and marrow suppression in the setting of broad spectrum antibiotics, bacteremia/sepsis, and critical illness. Cannot exclude chronic marrow suppression from past chemotherapy exposure, current nutritional deficit (albumin 2.2), or viral reactivation (Hep B Core ab total, pending viral load).  - Daily CBC with differential  - Maintain active type and screen  - Repeat folate and B12  - Hepatitis B Quantitative  - Would expect slight improvement in platelet count as GI bleed/infection resolve, but may be blunted by underlying inflammation and medications. Would continue with supportive transfusions for platelets <10k, <20k with fever, or <50k with active bleeding  - If patient requires a platelet transfusion, please check a CBC 1 hour after transfusion to assess for platelet refractoriness    # Anemia, normocytic  Review of prior admission show, hemoglobin 12-15 in January 2022 and hemoglobin past few months 10-12. On admission to [facility] on 7/7/23, Hgb 12.9 MCV 93. Patient with possible coffee ground emesis 7/29 and acute drop in hemoglobin to 6.7, bedside EGD findings of severe erosive esophagitis, small non bleeding stomach ulcer, and friable mucosa in duodenum. Per GI, severe erosive esophagitis likely cause of current anemia. Iron saturation 33% and ferritin 674, suggest degree of anemia of chronic inflammation contributing. Reticulocyte count 5.3%, but reticulocyte index 1.34 suggests overall hypoproliferation. Current anemia is likely multifactorial in the setting of severe erosive esophagitis, broad spectrum antibiotics, infection, and critical illness.  - Daily CBC with differential  - Maintain active type and screen  - Transfuse for Hgb <7 or active bleeding  - Appreciate GI recommendations    # Leukopenia, mild  Slight decline in WBC count around 7/30, no neutropenia noted on 8/1. Likely multifactorial in the setting of severe erosive esophagitis, broad spectrum antibiotics, infection, and critical illness.  - Infectious work up and management per primary team    Discussed with hematology oncology attending Dr. Emi Del Rio. Recommendations are considered final after attending attestation.  68 M with pmhx of T2DM, HLD, JAGDISH, hyponatremia, tracheal stenosis s/p tracheostomy, and glioblastoma (s/p cerebral angiogram with IA Avastin treatment in 3/2023) admitted for AMS and weakness, found to have expressive aphasia and imaging findings concerning for tumor recurrence at surgical site. Hospital course complicated by septic shock likely 2/2 K. pneumoniae and possible upper GI bleeding. Hematology consulted for pancytopenia.    Platelet trend from July 26th: 100 (7/26)-> 88 (7/27)-> 68 (7/28)-> 87 (7/28)->52/55 (7/29)-> 1 unit platelets -> 88 (7/28)-> 47 (7/30)-> 81 (7/30)-> 55 (7/30)-> 1 unit platelets-> 78 (7/30)-> 1 unit platelets-> 117 (7/31)-> 88 (7/31)-> 73 (8/1)-> 78 (8/2)-> 69 (8/2)-> 67 (8/3)    Peripheral smear review by fellow 8/3/23: moderate anisocytosis, occasional teardrop, slight 1-2 schistocyte per HPF, large and giant platelets present, 1-2 areas of mild platelet clumping noted    # Thrombocytopenia, moderate  Pt has had a history of thrombocytopenia with his baseline range  since the latter half of last year. He was previously on temozolomide and RT (completed treatment in 12/2022), which coincides with his noted thrombocytopenia. He received Avastin on 3/7, and had a flores of 63k. He hasn't received any Avastin on this admission. His platelet count this admission has ranged from 60s-100s until July 29th. Patient with klebsiella PNA bacteremia diagnosed on 7/28. Patient received vanc, flagyl, ciprofloxacin, ceftriaxone, and meropenem for septic shock, currently on ceftriaxone/flagyl. Patient with possible coffee ground emesis 7/29 and acute drop in hemoglobin to 6.7 as well as a drop in platelets. Post-platelet transfusion show appropriate response and no evidence for platelet refractoriness. Slight schistocytes on smear, but haptoglobin 127 and  argue against hemolysis (Plasmic score 3 pts, low risk of TTP). Fibrinogen 397, unlikely to be DIC. Direct jarret negative. Suspect current episode of thrombocytopenia is multifactorial including consumptive in the setting of GI bleed and marrow suppression in the setting of broad spectrum antibiotics, bacteremia/sepsis, and critical illness. Cannot exclude chronic marrow suppression from past chemotherapy exposure, current nutritional deficit (albumin 2.2), or viral reactivation (Hep B Core ab total, pending viral load).  - Daily CBC with differential  - Maintain active type and screen  - Repeat folate and B12  - Hepatitis B Quantitative  - Would expect slight improvement in platelet count as GI bleed/infection resolve, but may be blunted by underlying inflammation and medications. Would continue with supportive transfusions for platelets <10k, <20k with fever, or <50k with active bleeding  - If patient requires a platelet transfusion, please check a CBC 1 hour after transfusion to assess for platelet refractoriness    # Anemia, normocytic  Review of prior admission show, hemoglobin 12-15 in January 2022 and hemoglobin past few months 10-12. On admission to [facility] on 7/7/23, Hgb 12.9 MCV 93. Patient with possible coffee ground emesis 7/29 and acute drop in hemoglobin to 6.7, bedside EGD findings of severe erosive esophagitis, small non bleeding stomach ulcer, and friable mucosa in duodenum. Per GI, severe erosive esophagitis likely cause of current anemia. Iron saturation 33% and ferritin 674, suggest degree of anemia of chronic inflammation contributing. Reticulocyte count 5.3%, but reticulocyte index 1.34 suggests overall hypoproliferation. Current anemia is likely multifactorial in the setting of severe erosive esophagitis, broad spectrum antibiotics, infection, and critical illness.  - Daily CBC with differential  - Maintain active type and screen  - Transfuse for Hgb <7 or active bleeding  - Appreciate GI recommendations    # Leukopenia, mild  Slight decline in WBC count around 7/30, no neutropenia noted on 8/1. Likely multifactorial in the setting of severe erosive esophagitis, broad spectrum antibiotics, infection, and critical illness.  - Infectious work up and management per primary team    Discussed with hematology oncology attending Dr. Emi Del Rio. Recommendations are considered final after attending attestation.

## 2023-08-04 NOTE — PRE-ANESTHESIA EVALUATION ADULT - NSANTHPEFT_GEN_ALL_CORE
General: intubated, sedated  Eyes: The sclera and conjunctiva normal, pupils equal in size.  ENT: The ears and nose were normal in appearance; oropharynx clear, moist mucus membranes.  Neck: The appearance of the neck was normal, with no gross masses or nodules.  Respiratory: Unlabored, no retractions.  Neurological: sedated General: intubated, sedated  Eyes: The sclera and conjunctiva normal, pupils equal in size.  ENT: The ears and nose were normal in appearance; moist mucus membranes.  Neck: Previous trach site scarred  Respiratory: on CPAP  Neurological: sedated

## 2023-08-04 NOTE — PROGRESS NOTE ADULT - SUBJECTIVE AND OBJECTIVE BOX
=========================  NSICU ATTENDING PROGRESS NOTE  =========================    69 y/o male with past medical history of type 2 diabetes, hyperlipidemia, iron deficiency anemia, tracheal stenosis s/p tracheostomy, glioblastoma s/p resection 1/27/2022, and Avastin treatment 3/2023 presenting with altered mental status and weakness x2 days. Per family, LKN 6pm on 07/05 when he was at baseline. Patient has intermittent expressive aphasia but is getting worse and now is persistent, and has intermittent nausea and vomiting since 07/05. Patient was scheduled to have outpatient brain MRI on 7/25/2023.  Stroke code called in ED. Head CT shows new hyperdensity in frontal parietal - surgical site. MRI ordered to rule out tumor recurrence.   Per daughter at bedside, Entresto and Eliquis were stopped per his PC - cardiologist.  (07 Jul 2023 13:10)    HOSP COURSE:   7/27: Multiple BMs o/n. Na stable. Lexapro increased to 10mg daily for worsening depression. Held bowel regimen 2/2 multiple stools.   7/28: ANA LILIA overnight. Na 133 f/u AM labs, pending [place]. Pt has episode of sustained tachycardia, c/o feeling warm, and dizzy while working with Pt. Given 500cc NS bolus, rectal temp was 99F. Family brought in medical marijuana. Pt had rectal temp 100.9, pancultured. Given another 1L bolus and started on maintenance fluids. Rectal temp 102. Started empirically on vanc/cipro/flagyl. Upgraded to telemetry.  7/29: In AM patient had episode of emesis and was unresponsive to sternal rub. Dr. Huggins, neurointensivist at bedside. Repeat BP 60s/40s, tachycardic to 110s. Rapid response called. Levo gtt started. Upgraded to ICU. Intubated for airway protection. Likely septic shock given GNR in blood cx growing K.pneumoniae, Given additional 1L bolus x2. Abx changed to vanc and meropenem, ID following, rec to d/c vanc, c/w meropenem [id number], FOBT positive, keppra changed to vimpat 50BID, fentanyl 25mg Q4 hrs prn for vent dysynchrony, home lexapro decreased to 5mg, decadron decreased to 2q12, sucralfate increased to 1q6 iso GI bleed, pending repeat CBC, CMP, ABG, DIC labs, xray abdomen to r/o obstruction, propofol gtt started for sedation, weaning off precedex gtt, GI consulted rec protonix 40bid iso possible GI bleed, plan for possible EGD, albumin 1.7, 25% albumin 50ml given, s/p 1u PRBC and PLT repeat cbc hgb 8.0 from 6.6 and PLT 88 from 55. Protonix gtt started per GI recs.   7/30: ANA LILIA overnight. Neuro stable. Hemoglobin 6.5, platelets 47 o/n, transfused 1uPRBC, 1u platelets in AM. Repeat Hb 7.1 and platelets 81. CBC repeated and Hb 7.2 and platelets 74. Arnie test negative. 1 set of surveillance blood cultures sent. NS increased to 120cc/hr. Holding off on CT C/A/P per nephrology recs due to concern about IV contrast. Temp 100.8 F, given tylenol. Additional 1u PRBC given in evening for Hb 6.8.     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  Type 2 diabetes mellitus  Hyperlipidemia  Iron deficiency anemia  Nephrolithiasis  Thrombocytopenia  Hyponatremia  BPH (benign prostatic hyperplasia)  S/P craniotomy  H/O tracheostomy    REVIEW OF SYSTEMS: [x] Unable to Assess due to neurologic exam   [ ] All ROS addressed below are non-contributory, except:      ICU Vital Signs Last 24 Hrs  T(C): 37.1 (04 Aug 2023 05:40), Max: 38 (03 Aug 2023 17:58)  T(F): 98.7 (04 Aug 2023 05:40), Max: 100.4 (03 Aug 2023 17:58)  HR: 93 (04 Aug 2023 07:00) (85 - 101)  BP: 93/55 (04 Aug 2023 07:00) (85/53 - 142/71)  BP(mean): 69 (04 Aug 2023 07:00) (64 - 85)  ABP: 111/48 (04 Aug 2023 07:00) (100/49 - 135/59)  ABP(mean): 67 (04 Aug 2023 07:00) (63 - 85)  RR: 19 (04 Aug 2023 07:00) (14 - 19)  SpO2: 100% (04 Aug 2023 07:00) (99% - 100%)      08-03-23 @ 07:01  -  08-04-23 @ 07:00  --------------------------------------------------------  IN: 2294 mL / OUT: 2520 mL / NET: -226 mL    08-04-23 @ 07:01  -  08-04-23 @ 07:27  --------------------------------------------------------  IN: 50 mL / OUT: 0 mL / NET: 50 mL      Mode: CPAP with PS, RR (machine): 17, FiO2: 40, PEEP: 5, PS: 5, MAP: 6, PIP: 14    PHYSICAL EXAM:  General: Calm, intubated; off sedation  HEENT: ETT, OG tube  Neurological: Grimaces to pain, following commands to open and close eyes, sticks out tongue  Eyes opening to noxious stim, tracks, pupils 3mm reactive & midline b/l, WD all extremities to noxious stim (though LUE localizes), +cough/+gag,   Pulmonary: Diminished at bases  Cardiovascular: S1, S2, Regular Rate and Rhythm   Gastrointestinal: Soft, Nontender, Nondistended   Extremities: No edema       MEDICATIONS:   acetaminophen     Tablet .. 650 milliGRAM(s) Oral every 6 hours PRN  atorvastatin 20 milliGRAM(s) Oral at bedtime  cefTRIAXone   IVPB 1000 milliGRAM(s) IV Intermittent every 24 hours  chlorhexidine 0.12% Liquid 15 milliLiter(s) Oral Mucosa every 12 hours  chlorhexidine 2% Cloths 1 Application(s) Topical <User Schedule>  dexAMETHasone  Injectable 2 milliGRAM(s) IV Push every 12 hours  escitalopram 5 milliGRAM(s) Oral daily  fentaNYL    Injectable 25 MICROGram(s) IV Push every 2 hours PRN  insulin glargine Injectable (LANTUS) 14 Unit(s) SubCutaneous at bedtime  insulin lispro (ADMELOG) corrective regimen sliding scale   SubCutaneous every 6 hours  lacosamide IVPB 50 milliGRAM(s) IV Intermittent every 12 hours  metroNIDAZOLE  IVPB      metroNIDAZOLE  IVPB 500 milliGRAM(s) IV Intermittent every 8 hours  multivitamin 1 Tablet(s) Oral daily  norepinephrine Infusion 0.05 MICROgram(s)/kG/Min (6.81 mL/Hr) IV Continuous <Continuous>  ondansetron Injectable 4 milliGRAM(s) IV Push every 6 hours PRN  pantoprazole  Injectable 40 milliGRAM(s) IV Push every 12 hours  sodium chloride 3 Gram(s) Oral every 6 hours  sodium chloride 0.9%. 1000 milliLiter(s) (50 mL/Hr) IV Continuous <Continuous>  sucralfate suspension 1 Gram(s) Oral every 6 hours      LABS:                       7.9    2.24  )-----------( 55       ( 04 Aug 2023 02:39 )             24.3     08-04    137  |  109<H>  |  14  ----------------------------<  168<H>  3.6   |  22  |  0.44<L>    Ca    7.0<L>      04 Aug 2023 02:39  Phos  2.4     08-04  Mg     1.8     08-04    Culture - Blood (collected 08-03-23 @ 14:21)  Source: .Blood Blood-Peripheral  Preliminary Report (08-04-23 @ 03:00):    No growth at 12 hours    Culture - Blood (collected 08-03-23 @ 14:21)  Source: .Blood Blood-Peripheral  Preliminary Report (08-04-23 @ 03:00):    No growth at 12 hours      Culture - Blood (collected 07-31-23 @ 09:02)  Source: .Blood Blood  Preliminary Report (07-31-23 @ 22:00):    No growth at 12 hours    Culture - Blood (collected 07-30-23 @ 12:52)  Source: .Blood Blood  Preliminary Report (07-31-23 @ 02:00):    No growth at 12 hours                         =========================  NSICU ATTENDING PROGRESS NOTE  =========================    69 y/o male with past medical history of type 2 diabetes, hyperlipidemia, iron deficiency anemia, tracheal stenosis s/p tracheostomy, glioblastoma s/p resection 1/27/2022, and Avastin treatment 3/2023 presenting with altered mental status and weakness x2 days. Per family, LKN 6pm on 07/05 when he was at baseline. Patient has intermittent expressive aphasia but is getting worse and now is persistent, and has intermittent nausea and vomiting since 07/05. Patient was scheduled to have outpatient brain MRI on 7/25/2023.  Stroke code called in ED. Head CT shows new hyperdensity in frontal parietal - surgical site. MRI ordered to rule out tumor recurrence.   Per daughter at bedside, Entresto and Eliquis were stopped per his PC - cardiologist.  (07 Jul 2023 13:10)    HOSP COURSE:   7/27: Multiple BMs o/n. Na stable. Lexapro increased to 10mg daily for worsening depression. Held bowel regimen 2/2 multiple stools.   7/28: ANA LILIA overnight. Na 133 f/u AM labs, pending [place]. Pt has episode of sustained tachycardia, c/o feeling warm, and dizzy while working with Pt. Given 500cc NS bolus, rectal temp was 99F. Family brought in medical marijuana. Pt had rectal temp 100.9, pancultured. Given another 1L bolus and started on maintenance fluids. Rectal temp 102. Started empirically on vanc/cipro/flagyl. Upgraded to telemetry.  7/29: In AM patient had episode of emesis and was unresponsive to sternal rub. Dr. Huggins, neurointensivist at bedside. Repeat BP 60s/40s, tachycardic to 110s. Rapid response called. Levo gtt started. Upgraded to ICU. Intubated for airway protection. Likely septic shock given GNR in blood cx growing K.pneumoniae, Given additional 1L bolus x2. Abx changed to vanc and meropenem, ID following, rec to d/c vanc, c/w meropenem [id number], FOBT positive, keppra changed to vimpat 50BID, fentanyl 25mg Q4 hrs prn for vent dysynchrony, home lexapro decreased to 5mg, decadron decreased to 2q12, sucralfate increased to 1q6 iso GI bleed, pending repeat CBC, CMP, ABG, DIC labs, xray abdomen to r/o obstruction, propofol gtt started for sedation, weaning off precedex gtt, GI consulted rec protonix 40bid iso possible GI bleed, plan for possible EGD, albumin 1.7, 25% albumin 50ml given, s/p 1u PRBC and PLT repeat cbc hgb 8.0 from 6.6 and PLT 88 from 55. Protonix gtt started per GI recs.   7/30: ANA LILIA overnight. Neuro stable. Hemoglobin 6.5, platelets 47 o/n, transfused 1uPRBC, 1u platelets in AM. Repeat Hb 7.1 and platelets 81. CBC repeated and Hb 7.2 and platelets 74. Arnie test negative. 1 set of surveillance blood cultures sent. NS increased to 120cc/hr. Holding off on CT C/A/P per nephrology recs due to concern about IV contrast. Temp 100.8 F, given tylenol. Additional 1u PRBC given in evening for Hb 6.8.     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  Type 2 diabetes mellitus  Hyperlipidemia  Iron deficiency anemia  Nephrolithiasis  Thrombocytopenia  Hyponatremia  BPH (benign prostatic hyperplasia)  S/P craniotomy  H/O tracheostomy    REVIEW OF SYSTEMS: [x] Unable to Assess due to neurologic exam   [ ] All ROS addressed below are non-contributory, except:      ICU Vital Signs Last 24 Hrs  T(C): 37.1 (04 Aug 2023 05:40), Max: 38 (03 Aug 2023 17:58)  T(F): 98.7 (04 Aug 2023 05:40), Max: 100.4 (03 Aug 2023 17:58)  HR: 93 (04 Aug 2023 07:00) (85 - 101)  BP: 93/55 (04 Aug 2023 07:00) (85/53 - 142/71)  BP(mean): 69 (04 Aug 2023 07:00) (64 - 85)  ABP: 111/48 (04 Aug 2023 07:00) (100/49 - 135/59)  ABP(mean): 67 (04 Aug 2023 07:00) (63 - 85)  RR: 19 (04 Aug 2023 07:00) (14 - 19)  SpO2: 100% (04 Aug 2023 07:00) (99% - 100%)      08-03-23 @ 07:01  -  08-04-23 @ 07:004  --------------------------------------------------------  IN: 2294 mL / OUT: 2520 mL / NET: -226 mL    08-04-23 @ 07:01  -  08-04-23 @ 07:27  --------------------------------------------------------  IN: 50 mL / OUT: 0 mL / NET: 50 mL      Mode: CPAP with PS, RR (machine): 17, FiO2: 40, PEEP: 5, PS: 5, MAP: 6, PIP: 14    PHYSICAL EXAM:  General: Calm, intubated; off sedation  HEENT: ETT, OG tube  Neurological: Grimaces to pain, following commands to open and close eyes, sticks out tongue  Eyes opening to noxious stim, tracks, pupils 3mm reactive & midline b/l, localizes bilateral upper extremities, lowers trace WD   +cough/+gag,   Pulmonary: Diminished at bases  Cardiovascular: S1, S2, Regular Rate and Rhythm   Gastrointestinal: Soft, Nontender, Nondistended   Extremities: No edema       MEDICATIONS:   acetaminophen     Tablet .. 650 milliGRAM(s) Oral every 6 hours PRN  atorvastatin 20 milliGRAM(s) Oral at bedtime  cefTRIAXone   IVPB 1000 milliGRAM(s) IV Intermittent every 24 hours  chlorhexidine 0.12% Liquid 15 milliLiter(s) Oral Mucosa every 12 hours  chlorhexidine 2% Cloths 1 Application(s) Topical <User Schedule>  dexAMETHasone  Injectable 2 milliGRAM(s) IV Push every 12 hours  escitalopram 5 milliGRAM(s) Oral daily  fentaNYL    Injectable 25 MICROGram(s) IV Push every 2 hours PRN  insulin glargine Injectable (LANTUS) 14 Unit(s) SubCutaneous at bedtime  insulin lispro (ADMELOG) corrective regimen sliding scale   SubCutaneous every 6 hours  lacosamide IVPB 50 milliGRAM(s) IV Intermittent every 12 hours  metroNIDAZOLE  IVPB      metroNIDAZOLE  IVPB 500 milliGRAM(s) IV Intermittent every 8 hours  multivitamin 1 Tablet(s) Oral daily  norepinephrine Infusion 0.05 MICROgram(s)/kG/Min (6.81 mL/Hr) IV Continuous <Continuous>  ondansetron Injectable 4 milliGRAM(s) IV Push every 6 hours PRN  pantoprazole  Injectable 40 milliGRAM(s) IV Push every 12 hours  sodium chloride 3 Gram(s) Oral every 6 hours  sodium chloride 0.9%. 1000 milliLiter(s) (50 mL/Hr) IV Continuous <Continuous>  sucralfate suspension 1 Gram(s) Oral every 6 hours      LABS:                       7.9    2.24  )-----------( 55       ( 04 Aug 2023 02:39 )             24.3     08-04    137  |  109<H>  |  14  ----------------------------<  168<H>  3.6   |  22  |  0.44<L>    Ca    7.0<L>      04 Aug 2023 02:39  Phos  2.4     08-04  Mg     1.8     08-04    Culture - Blood (collected 08-03-23 @ 14:21)  Source: .Blood Blood-Peripheral  Preliminary Report (08-04-23 @ 03:00):    No growth at 12 hours    Culture - Blood (collected 08-03-23 @ 14:21)  Source: .Blood Blood-Peripheral  Preliminary Report (08-04-23 @ 03:00):    No growth at 12 hours      Culture - Blood (collected 07-31-23 @ 09:02)  Source: .Blood Blood  Preliminary Report (07-31-23 @ 22:00):    No growth at 12 hours    Culture - Blood (collected 07-30-23 @ 12:52)  Source: .Blood Blood  Preliminary Report (07-31-23 @ 02:00):    No growth at 12 hours

## 2023-08-04 NOTE — PROGRESS NOTE ADULT - SUBJECTIVE AND OBJECTIVE BOX
HPI:  Osiel Norwood is a 69YO male with a past medical history of type 2 diabetes, hyperlipidemia, iron deficiency anemia, tracheal stenosis s/p tracheostomy (which was closed by ENT 7 days ago); glioblastoma s/p resection 2022, and Avastin treatment 3/2023 presenting with altered mental status and weakness x2 days. Per family, they last saw him last night around 6pm when he was at baseline. Patient has intermittent expressive aphasia but is getting worse and now is persistent, and has intermittent nausea and vomiting since last night. Patient was supposed to receive an outpatient brain MRI on 2023.  Stroke code performed in  is negative for CVA. Head CT shows new hyperdensity in frontal parietal - surgical site. MRI is ordered to rule out tumor recurrence.   Per daughter at bedside, Entresto and Eliquis were stopped per his PC - cardiologist.      (2023 13:10)    Hospital course:  : Admitted. Na 115 in ED with repeat 118, started 3% at 30cc/hr, and salt tabs 3q6, stability CTH in ED showing Interval development of a 13 mm hyperdense nodule along the   inferior margin of the resection cavity which may represent progression   of disease with hemorrhage, repeat CTH stable, urine studies ordered  : Neuro stable, 12AM BMP Na114 3% increased to 40cc/hr, urine studies, pancx to r/o infection since pt is immunocompromised. Changed pantoprazole to sucralfate for GI ppx d/t thrombocytopenia. LE dopplers negative. DC'd 3%. ENT consulted to assess stoma, recommend follow up upon discharge with ENT, Repeat sodium 122. Restarted 3% at 30.  : ANA LILIA o/n neuro stable. remains on 3%@30cc/hr. Started florinef, increased 3% to 50cc/hr. Increased 3% to 75cc/hr.    7/10: continued current 3% rate o/n. Na 127 --> 125, cont 3% @75cc/hr, f/u repeat Na this evening, likely stepdown tomorrow. Pend dispo home with home PT/OT when able. Heme consulted for thrombocytopenia  : ANA LILIA o/n. Remains on 3% @75cc/hr. Decreased 3% to 50cc/hr. Started 1.2L fluid restriction. Repeat Na corrected 128  : ANA LILIA overnight, remains on 3%, SDU from ICU  : ANA LILIA overnight, neuro stable, on 3%@50, Am sodium 137, decreased 3% to 25cc/hr, repeat Na 138, decreased to 15cc/hr.  : ANA LILIA overnight, neuro stable, remains on 3%@15cc/hr  7/15: ANA LILIA overnight. Neuro exam stable. Pt remains on 3% saline.Sodium 134 this AM remains on 3%@25. Per nephrology recs hypertonic Na d'ceed, Na tabs 2q6, URENA 15g BID, cont florinef/ fluid restriction.Per nephrology Na >130 ok when ready for d/c   : ANA LILIA overnight, hypertonics d/c now on urea powder/1L fluid restriction/florinef and salt tabs per renal, hydrocortisone cream ordered for rash on back, rec for Home PT  : ANA LILIA overnight, following Na, renal following, ENT saw for hoarse voice rec followup with CT surgery for possible dilation, pending Home PT. Patient developed sudden onset severe headache. Hyperventilating with increased work of breathing. Wheezing in all lung fields to auscultation. Neuro intact on exam. Duoneb x1 ordered. Dilaudid 0.25 IV given for pain control. Subsequently patient developed nausea with multiple episodes of vomiting. Hypertensive with SBP in the 190s. Given hydralazine 10mg IVP, Zofran. Stroke code and rapid response called. STAT labs sent. CTH/CTA/CTP completed. Tachycardic to the 180s, consistent with SVT, remained hypertensive. Given 10 of labetalol, SVT broke. Transferred to Telemetry. WBC 23.43, lactate 8.5. Given 1L fluid bolus. Pan culture sent. Started empirically on Vanc/Meropenem.   : Put on eeg. Voiding while retaining urine, SC for 500cc. EEG +spikes, epilepsy consulted. CTA chest and CT A/P pending. Keppra increased 1g BID.   : Cont EEG, trend Na, possible NGT today if not able to tolerate PO. Blood cx growing GS + cocci in clusters, f/u MRSA swab and pan cx, cont meropenem and vancomycin. Vanc trough in am. F/u urine studies.   : Off EEG, neuro exam improved. ID following for concern for sepsis, likely aerobic blood cx bottle staph epi contaminant, cont monitoring off of abx, possible drug reaction as cause. F/u surveillance blood cx. Hyponatremia, repeat Na 129 overnight from 130, cont current regimen and f/u am labs and nephrology recs. AM na 129 continuing fluid restriction, nephro recommending restarting urena, but holding due to possible drug reaction previously.   : neurologically stable, c/w fluid restriction, f/u AM Na, urine osm, urine Na, cortisol. Given 15mg of tolvaptan. Fluid restriction, florinef d/c'd per nephro recs. Held salt tabs for today. 6pm BMP Na 122, 10pm BMP Na 131, urine osm 141  : ANA LILIA ovn, neuro stable. AM Na 133. Restarted salt tabs 3q6 and 1L fluid restriction. Lantus 10u at bedtime added. 9:30pm BMP per nephro Na 138  : ANA LILIA ovn, neuro stable. Na stable, discussed with nephrology can cont tolvaptan 15mg daily PRN for hyponatremia, only needed for hypoNa, can discontinue fluid restriction. Pt evaluated at bedside after nurse noted patient to have some expressive aphasia and perseverating; patient oriented to self, unable to say hospital or year, following all commands and DUMONT 5/5 strength, no drift, no facial droop, perseverating on pt's  when asked other questions. Pt afebrile, vitals stable, cont keppra 1g BID, cont decadron 4mg BID, discussed with Dr. Dorado, defer CTH at this time and monitor clinically. . Lantus increased to 14u at bedtime.  : o/n PSVT 13 beats followed by sinus tachycardia in setting of anxiety and net negative fluid balance.  Resolved to 102 with verbal comfort and further resolved with 500 cc NS. Lantus increased to 18u qhs. Dr. Costa consulted.  : ANA LILIA overnight. Neuro stable.   : ANA LILIA overnight. Neuro exam stable. Dispo pending.  : multiple BMs o/n. Na stable. Lexapro increased to 10mg daily for worsening depression. Held bowel regimen 2/2 multiple stools.   : ANA LILIA overnight, Na 133 f/u AM labs, pending [place]. Pt has episode of sustained tachycardia, c/o feeling warm, and dizzy while working with Pt. Given 500cc NS bolus, rectal temp was 99F. Family brought in medical marijuana. Pt had rectal temp 100.9, pancultured. Given another 1L bolus and started on maintenance fluids. Rectal temp 102. Started empirically on vanc/cipro/flagyl. Upgraded to telemetry.  : In AM patient had episode of emesis and was unresponsive to sternal rub. Dr. Huggins, neurointensivist at bedside. Repeat BP 60s/40s, tachycardic to 110s. Rapid response called. Levo gtt started. Upgraded to ICU. Intubated for airway protection. Likely septic shock given GNR in blood cx growing K.pneumoniae, Given additional 1L bolus x2. Abx changed to vanc and meropenem, ID following, rec to d/c vanc, c/w meropenem [id number], FOBT positive, keppra changed to vimpat 50BID, fentanyl 25mg q4hrs prn for vent dysynchrony, home lexapro decreased to 5mg, decadron decreased to 2q12, sucralfate increased to 1q6 iso GI bleed, pending repeat CBC, CMP, ABG, DIC labs, xray abdomen to r/o obstruction, propofol gtt started for sedation, weaning off precedex gtt, GI consulted rec protonix 40bid iso possible GI bleed, plan for possible EGD, albumin 1.7, 25% albumin 50ml given, s/p 1u PRBC and PLT repeat cbc hgb 8.0 from 6.6 and PLT 88 from 55. Protonix gtt started per GI recs.   : ANA LILIA overnight. Neuro stable. Hemoglobin 6.5, platelets 47 o/n, transfused 1uPRBC, 1u platelets in AM. Repeat Hb 7.1 and platelets 81. CBC repeated and Hb 7.2 and platelets 74. Arnie test negative. 1 set of surveillance blood cultures sent. NS increased to 120cc/hr. Holding off on CT C/A/P per nephrology recs due to concern about IV contrast. Temp 100.8 F, given tylenol. Additional 1u PRBC given in evening for Hb 6.8.   : 1u prbc given in am. 6 units lantus started at bedtime. Per ID stopped meropenam and started ceftriaxone 1qd and flagyl 500 q8.  Plan for EGD tomorrow with GI. Blood and urine cx sent per ID recs.   : Tachycardic, gave 20 IV lasix. Hgb at the time stable. NS@20 for renal protection. lantus increased to 14u. Lactate normalized. Given 500cc bolus for soft pressures, restarted levo gtt temporarily, now off. Endoscopy done with GI showed severe circumferential esophagitis, small ulcer to anterior stomach. propofol changed to precedex.   : ANA LILIA overnight, remains sedated on precedex and intubated on ACVC.  Pt tolerating CPAP. Campuzano removed, failed TOV, and straight catheterized. Trickle feeds started 10cc/hr. Changed protonix gtt transitioned to 40mg IV BID per GI. ENT consulted for trach planning.  8/3: Remains on levo. Campuzano replaced by urology. TF held for high residuals. 1u PRBC transfused. 250cc albumin and 500cc NS bolus. Plan for trach with ENT .  : ANA LILIA overnight. Neuro exam stable. Remains on levo gtt.    Vital Signs Last 24 Hrs  T(C): 37.1 (04 Aug 2023 01:11), Max: 38 (03 Aug 2023 17:58)  T(F): 98.7 (04 Aug 2023 01:11), Max: 100.4 (03 Aug 2023 17:58)  HR: 92 (04 Aug 2023 01:00) (83 - 101)  BP: 107/58 (04 Aug 2023 01:00) (85/53 - 120/64)  BP(mean): 77 (04 Aug 2023 01:00) (64 - 88)  RR: 17 (04 Aug 2023 01:00) (15 - 19)  SpO2: 100% (04 Aug 2023 01:00) (99% - 100%)    Parameters below as of 04 Aug 2023 01:00  Patient On (Oxygen Delivery Method): ventilator, cpap    O2 Concentration (%): 40    I&O's Summary    02 Aug 2023 07:01  -  03 Aug 2023 07:00  --------------------------------------------------------  IN: 1281.6 mL / OUT: 1950 mL / NET: -668.4 mL    03 Aug 2023 07:01  -  04 Aug 2023 02:08  --------------------------------------------------------  IN: 1784 mL / OUT: 2095 mL / NET: -311 mL      PHYSICAL EXAM:  General: patient seen laying supine in bed in NAD  Neuro: OE to noxious, RUE localizes, LUE and b/l LE w/d, +cough/gag  Neck: supple  Cardiac: RRR, S1S2  Pulmonary: chest rise symmetric  Abdomen: soft, nontender, nondistended  Ext: perfusing well, +petechiae abdomen, b/l extremities and scrotum   Skin: warm, dry    TUBES/LINES:  [ ] L radial bassam ( - )   [ ] R IJ Central line ( - )   [ ] Campuzano (8/3- )    DIET:  [x] NPO  [] Mechanical  [] Tube feeds - NGT     LABS:                        8.4    2.42  )-----------( 59       ( 03 Aug 2023 17:14 )             25.1     -    139  |  108  |  18  ----------------------------<  163<H>  3.6   |  24  |  0.47<L>    Ca    7.4<L>      03 Aug 2023 05:20  Phos  2.9     08-  Mg     1.8     08-        Urinalysis Basic - ( 03 Aug 2023 14:21 )    Color: Yellow / Appearance: Cloudy / S.025 / pH: x  Gluc: x / Ketone: 15 mg/dL  / Bili: Moderate / Urobili: 1.0 E.U./dL   Blood: x / Protein: 100 mg/dL / Nitrite: POSITIVE   Leuk Esterase: Trace / RBC: Many /HPF / WBC < 5 /HPF   Sq Epi: x / Non Sq Epi: x / Bacteria: Present /HPF          CAPILLARY BLOOD GLUCOSE      POCT Blood Glucose.: 162 mg/dL (03 Aug 2023 22:32)  POCT Blood Glucose.: 210 mg/dL (03 Aug 2023 17:25)  POCT Blood Glucose.: 197 mg/dL (03 Aug 2023 11:04)  POCT Blood Glucose.: 163 mg/dL (03 Aug 2023 06:48)      Drug Levels: [] N/A    CSF Analysis: [] N/A      Allergies    amoxicillin (Rash)  ure-Na (Rash; Fever; Hypotension)    Intolerances      MEDICATIONS:  Antibiotics:  cefTRIAXone   IVPB 1000 milliGRAM(s) IV Intermittent every 24 hours  metroNIDAZOLE  IVPB      metroNIDAZOLE  IVPB 500 milliGRAM(s) IV Intermittent every 8 hours    Neuro:  acetaminophen     Tablet .. 650 milliGRAM(s) Oral every 6 hours PRN  escitalopram 5 milliGRAM(s) Oral daily  fentaNYL    Injectable 25 MICROGram(s) IV Push every 2 hours PRN  lacosamide IVPB 50 milliGRAM(s) IV Intermittent every 12 hours  ondansetron Injectable 4 milliGRAM(s) IV Push every 6 hours PRN    Anticoagulation:    OTHER:  atorvastatin 20 milliGRAM(s) Oral at bedtime  chlorhexidine 0.12% Liquid 15 milliLiter(s) Oral Mucosa every 12 hours  chlorhexidine 2% Cloths 1 Application(s) Topical <User Schedule>  dexAMETHasone  Injectable 2 milliGRAM(s) IV Push every 12 hours  insulin glargine Injectable (LANTUS) 14 Unit(s) SubCutaneous at bedtime  insulin lispro (ADMELOG) corrective regimen sliding scale   SubCutaneous every 6 hours  norepinephrine Infusion 0.05 MICROgram(s)/kG/Min IV Continuous <Continuous>  pantoprazole  Injectable 40 milliGRAM(s) IV Push every 12 hours  sucralfate suspension 1 Gram(s) Oral every 6 hours    IVF:  multivitamin 1 Tablet(s) Oral daily  sodium chloride 3 Gram(s) Oral every 6 hours  sodium chloride 0.9%. 1000 milliLiter(s) IV Continuous <Continuous>    CULTURES:  Culture Results:   Rare to few Klebsiella pneumoniae  Accompanied by normal respiratory lesa ( @ 17:19)  Culture Results:   No growth at 3 days. ( @ 09:02)    RADIOLOGY & ADDITIONAL TESTS:      ASSESSMENT:  69 YO male PMH T2DM, HLD, JAGDISH, tracheal stenosis s/p tracheostomy (s/p closure); glioblastoma s/p resection 2022, and [place] Avastin treatment 3/2023 adm with aphasia, N/V and severe hypoNa (118), imaging showing disesase progression, Hospital course complicated by sepsis secondary to bacteremia, decompensated, intubated, started on antibiotics and upgrated to ICU on .       PLAN:  Neuro:  -neuro q4h/vitals q1hrs   -CTH : Interval development of a 13 mm hyperdense nodule along the inferior margin of the resection cavity which may represent progression of disease with hemorrhage. Repeat CTH stable, CTH  stable   -CTA : negative, CTA  stable  -CTP  stable.   -pain control prn, fentanyl IVP prn  -vimpat 50mg BID (switched from keppra  thrombocytopenia) for seizure tx  -Decadron 2mg BID  -c/w home Lexapro 5mg (home dose 5mg)  -home ASA 81 held i/s/o thrombocytopenia   -MRI brain  suspicious for recurrent tumor and hydro   -palliative following     Pulm:  -VC/AC 14/400/40/5, CPAP trials  -plan for trach  with ENT    Cardio:  - MAP goal >65, levo gtt   -  outpatient echo EF 65%  - echo : EF 60-65%    GI:  - NGT tube, NPO for high residuals   - bowel regimen held for loose stools, LBM 8/3  - protonix BID and carafate 1g q6h for upper GI bleed  - GI following, s/p EGD : severe circumferential esophagitis, small ulcer to anterior stomach    Renal/:  - Hyponatremic (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 as well as SSRI use), s/p tolvaptan ; currently on NaCl tabs 3g q6h  - NS @ 50cc/hr while NPO  - Campuzano replaced 8/3 by urology for difficult straight cath    Endo:  -A1c 5.7  -lantus 14u qhs, ISS  -h/o T2DM: home Januvia held  -h/o HLD: c/w Atorvastatin 20mg    Heme:  -SCDs for DVT ppx, SQL and Aspirin 81 held for thrombocytopenia   -heme consulted for thrombocytopenia- w/u sent, likely chronic thrombocytopenia d/t chemotherapy agent in past   -Per heme transfuse 1U plt if bleeding and platelet count <50k, if febrile and platelet count <20k. Hold transfusing platelets until tomorrow before procedure.   -1u PRBC, 1U PLT , 2u PRBC, 1u PLT , 1u PRBC 8/3  -LE dopplers  negative  -LUE doppler : L superficial cephalic vein thrombophlebitis  -CTA PE protocol for tachycardia: negative     ID:  - Pancultured , bcx growing K. pneumoniae, sputum cx K. pneumoniae, f/u blood cx   - meropenem 1g q8hrs (-), changed to Ceftriaxone/Flagyl per ID recs (- )  - CT CAP : Wall thickening in the colon suggesting an infectious or inflammatory colitis, Suspected small duodenal ulcer with surrounding edema in, Distended gallbladder w/o cholecystitis. Mild ascites. Bibasilar pneumonia L>R.  -Febrile, leukocytosis and tachycardia with elevated lactic acid on   -vancomycin/meropenem empirically (-), d/c per ID and repeat blood cx , NGTD    Dispo: NSICU status, full code, AR    D/w Dr. Dorado and Dr. Lerma

## 2023-08-04 NOTE — CHART NOTE - NSCHARTNOTEFT_GEN_A_CORE
ANA LILIA overnight. Neuro exam stable. Off levo since 12am. POD 0 trach with ENT. Increased Lantus to 20u 2/2 high FS. +RT 2/2 multiple loose stools. Neurology consulted 2/2 urine appearance, NTD from their standpoint. ANA LILIA overnight. Neuro exam stable. Off levo since 12am. POD 0 trach with ENT. Increased Lantus to 20u 2/2 high FS. +RT 2/2 multiple loose stools. Nephrology consulted 2/2 urine appearance, NTD from their standpoint.

## 2023-08-04 NOTE — PRE-ANESTHESIA EVALUATION ADULT - NSANTHOSAYNRD_GEN_A_CORE
No. CALVIN screening performed.  STOP BANG Legend: 0-2 = LOW Risk; 3-4 = INTERMEDIATE Risk; 5-8 = HIGH Risk
No. CALVIN screening performed.  STOP BANG Legend: 0-2 = LOW Risk; 3-4 = INTERMEDIATE Risk; 5-8 = HIGH Risk

## 2023-08-04 NOTE — PROGRESS NOTE ADULT - ASSESSMENT
ASSESSMENT:   69 y/o M with septic shock secondary to Klebsiella bacteremia   GBM s/p resection, recent chemotherapy  History of Takotsubo cardiomyopathy  Chronic SIADH  Type 2 DM    NEURO:  Neurochecks Q4  Analgesia: Fentanyl prn  Sedation: None  Seizure history: Continue vimpat  Cerebral edema: On dexamethasone 2mg Q12  Activity: Bedrest for now. PT/OT evaluation when stable   Palliative care following    PULMONARY: Currently requiring mechanical ventilation for airway protection   Vent settings 14/400/40/5- CPAP as tolerated  CXR, ABG  VAP bundle        CARDIOVASCULAR: Hypotension in setting of likely septic shock and acute blood loss anemia 2/2 suspected GIB  MAP goal 65-75. On low dose levophed  TTE: EF 60-65%. No WMA, no vegetation  POCUS 8/3: No significant B lines. IVC collapsible  Lactate wnl    GASTROENTEROLOGY: Suspected GI bleed, fecal occult positive.   NPO, S/P trickle feeds 8/2 though high residuals. AXR to look for ileus  GI consulted for endoscopy evaluation- 8/1 scope showed esophagitis and non-bleeding ulcer  GI ppx: On protonix gtt-> change to bid. Continue sucralfate  CT chest/ abd/ pelvis ? small ulcer at first portion of duodenum  Will need PEG    RENAL/: Dark urine; ?myoglobin vs hematuria vs bilirubinemia. Chronic SIADH  Monitor BMP daily  Campuzano; monitor Is/Os  Na goal 135-145; replete lytes   Urology following    ENDOCRINE: Diabetes Mellitus  A1c- 5.7  ISS while NPO  Lantus 14u qhs    HEME/ONC: Pancytopenia likely chemo-induced, JAGDISH.  Concern for GIB: Trend H/H and plt. Hb goal >7.0, plt goal >80  Transfuse 2 unit platelets prior to trach; repeat CBC thereafter  Plt antibody  DVT ppx: will hold chemical DVT ppx in setting of low platelets/ bleed  B/L SCDs  Heme following    INFECTIOUS: Klebsiella bacteremia ?source- possible translocation  S/P meropenem-> CTX and flagyl (?duration).  ID following  Repeat blood cultures 7/31 and daily. NGTD from 7/30 and 7/31  Culture on 8/3; follow up    MISC:  Palliative care following, appreciate recommendations    SOCIAL/FAMILY:  [] awaiting [x] updated daughter and son-in- law at bedside [] family meeting    CODE STATUS:  [x] Full Code [] DNR [] DNI [] Palliative/Comfort Care    DISPOSITION:  [x] ICU [] Stroke Unit [] Floor [] EMU [] RCU [] PCU    Time spent: 60 critical care minutes     ASSESSMENT:   69 y/o M with septic shock secondary to Klebsiella bacteremia   GBM s/p resection, recent chemotherapy  History of Takotsubo cardiomyopathy  Chronic SIADH  Type 2 DM    NEURO:  Neurochecks Q4  Analgesia: Fentanyl prn  Sedation: None  Seizure history: Continue vimpat  Cerebral edema: On dexamethasone 2mg Q12  Activity: Bedrest for now. PT/OT evaluation when stable   Palliative care following    PULMONARY: Currently requiring mechanical ventilation for airway protection   Vent settings 14/400/40/5- CPAP as tolerated 5/5 40%  CXR, ABG  VAP bundle  Trach scheduled in OR 8/4    CARDIOVASCULAR: Hypotension in setting of likely septic shock and acute blood loss anemia 2/2 suspected GIB  MAP goal 65-75. Off pressors  TTE: EF 60-65%. No WMA, no vegetation  POCUS 8/3: No significant B lines. IVC collapsible  Lactate wnl    GASTROENTEROLOGY: Suspected GI bleed, fecal occult positive.   NPO for trach  GI consulted for endoscopy evaluation- 8/1 scope showed esophagitis and non-bleeding ulcer  GI ppx: PPI bid. Continue sucralfate  CT chest/ abd/ pelvis ? small ulcer at first portion of duodenum  Will need PEG eventually    RENAL/: Dark urine; ?myoglobin vs hematuria vs bilirubinemia. Chronic SIADH  Monitor BMP daily  Campuzano; monitor Is/Os  Na goal 135-145; replete lytes   Urology following  Nephrology follow up    ENDOCRINE: Diabetes mellitus  A1c- 5.7  ISS while NPO. Lantus 20u qhs. Titrate prn    HEME/ONC: Pancytopenia likely chemo-induced vs sepsis, bone marrow suppression  DIC resulted  Concern for GIB: Trend H/H and plt. Hb goal >7.0, plt goal >80  Transfuse 2 unit platelets prior to trach; repeat CBC thereafter  Plt antibody pending  DVT ppx: Will hold chemical DVT ppx in setting of low platelets/ bleed  B/L SCDs  Heme following    INFECTIOUS: Klebsiella bacteremia ?source- possible translocation  S/P meropenem-> CTX and flagyl (?duration).  ID following  Repeat blood cultures 7/31 and daily. NGTD from 7/30 and 7/31  Culture on 8/3; follow up. Thus far NGTD    MISC:  Palliative care following, appreciate recommendations    SOCIAL/FAMILY:  [] awaiting [x] updated daughter and son-in- law at bedside [] family meeting    CODE STATUS:  [x] Full Code [] DNR [] DNI [] Palliative/Comfort Care    DISPOSITION:  [x] ICU [] Stroke Unit [] Floor [] EMU [] RCU [] PCU    Time spent: 60 critical care minutes

## 2023-08-04 NOTE — CHART NOTE - NSCHARTNOTEFT_GEN_A_CORE
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 no edema noted. Vicente score: 13.    Labs:   08-04    137  |  109<H>  |  14  ----------------------------<  168<H>  3.6   |  22  |  0.44<L>    Ca    7.0<L>      04 Aug 2023 02:39  Phos  2.4     08-04  Mg     1.8     08-04      CAPILLARY BLOOD GLUCOSE      POCT Blood Glucose.: 180 mg/dL (04 Aug 2023 12:19)  POCT Blood Glucose.: 167 mg/dL (04 Aug 2023 06:26)  POCT Blood Glucose.: 162 mg/dL (03 Aug 2023 22:32)  POCT Blood Glucose.: 210 mg/dL (03 Aug 2023 17:25)      Medications:  MEDICATIONS  (STANDING):  atorvastatin 20 milliGRAM(s) Oral at bedtime  cefTRIAXone   IVPB 1000 milliGRAM(s) IV Intermittent every 24 hours  chlorhexidine 0.12% Liquid 15 milliLiter(s) Oral Mucosa every 12 hours  chlorhexidine 2% Cloths 1 Application(s) Topical <User Schedule>  dexAMETHasone  Injectable 2 milliGRAM(s) IV Push every 12 hours  escitalopram 5 milliGRAM(s) Oral daily  insulin glargine Injectable (LANTUS) 20 Unit(s) SubCutaneous at bedtime  insulin lispro (ADMELOG) corrective regimen sliding scale   SubCutaneous every 6 hours  lacosamide IVPB 50 milliGRAM(s) IV Intermittent every 12 hours  metroNIDAZOLE  IVPB      metroNIDAZOLE  IVPB 500 milliGRAM(s) IV Intermittent every 8 hours  multivitamin 1 Tablet(s) Oral daily  norepinephrine Infusion 0.05 MICROgram(s)/kG/Min (6.81 mL/Hr) IV Continuous <Continuous>  pantoprazole  Injectable 40 milliGRAM(s) IV Push every 12 hours  sodium chloride 3 Gram(s) Oral every 6 hours  sodium chloride 0.9%. 1000 milliLiter(s) (50 mL/Hr) IV Continuous <Continuous>  sucralfate suspension 1 Gram(s) Oral every 6 hours    MEDICATIONS  (PRN):  acetaminophen     Tablet .. 650 milliGRAM(s) Oral every 6 hours PRN Temp greater or equal to 38C (100.4F), Mild Pain (1 - 3)  fentaNYL    Injectable 25 MICROGram(s) IV Push every 2 hours PRN vent dysynchrony  ondansetron Injectable 4 milliGRAM(s) IV Push every 6 hours PRN Nausea and/or Vomiting      Weight: 72.6kg [4 Aug 2023]  Daily 72.7kg admit wt  Daily Height in cm: 170.18 (04 Aug 2023 11:52)      Weight Change: wt stable, anticipated wt fluctuations due to fluid shifts & improvement in edema. Recommend weekly weights for trending    IBW: 67.3kg   % IBW: 108%    Estimated energy needs:   Current body wt used for energy calculations as pt falls within % IBW. Needs estimated for age and adjusted for current clinical status, malnutrition    Calories: 0486-7249 kcals based on 25-30 kcals/kg  Protein: 87-109g based on 1.2-1.5g protein/kg  *Fluid needs per team    Subjective:   68 year old male with a past medical history of type 2 diabetes, hyperlipidemia, iron deficiency anemia, tracheal stenosis s/p tracheostomy (which was closed by ENT 7 days ago); glioblastoma s/p resection 1/27/2022, and Avastin treatment 3/2023 presenting with altered mental status, expressive aphasia, nausea/vomiting x1 day and weakness x2 days. CT shows new hyperdensity in left frontal parietal - surgical site c/f disease progression. 7/16: 1L fluid restriction. 7/17: Stroke code. 7/19: swallow eval, speech language pathologist recommends Regular with Thin liquids. Patient s/p rapid response 7/29; intubated. GI following for concern for GIB.    Chart reviewed, discussed with IDT. TMax: 38.0 C. HR WNL to high. BP WNL to low. MAPs trending >65 mmHg. I&Os x 24hrs: 2294 / 2520 = -226ml net. Pt remains intubated on MV, CPAP as tolerated, off sedation. Trach placement scheduled for today. TF stopped, NPO 8/3 due to high TF residuals yesterday [450cc] & trach placement. 8/1 scope showed esophagitis and non-bleeding ulcer. Labs notable for low phos, K+ previously low- concern for refeeding syndrome, needs repletion. Elevated chloride- adjust hydration prn. POC BG trending 112-227 mg/dL x 3 days. Recommend to restart nutrition as soon as medically feasible. RDN will continue to monitor, reassess, and intervene as appropriate.       Previous Nutrition Diagnosis: Moderate malnutrition r/t chronic disease AEB <75% nutrition needs >1 month, mild muscle wasting    Active [ X  ]  Resolved [   ]    If resolved, new PES:     Goal:  Pt will meet at least 75% of protein & energy needs via most appropriate route for nutrition     Recommendations:  1. Resume nutrition as soon as medically feasible  >> recommend change of formula to Vital 1.0 [peptide based formula, low fiber for easier digestion & absorption]  - recommend Vital 1.0 @20ml/hr x first 24hrs with close monitoring for s/s GI distress, close monitoring of lytes & repletion prn  - goal: Vital 1.0 @75ml/hr x 24hrs; provides 1800ml total volume, 1800kcals, 72g protein, 1501.2ml free water daily  - additional 1 LPS daily; provides 100kcals, 15g protein  - EN + Modular to provide 26.1 kcals/kg & 1.2g protein/kg  - consider post pyloric TF placement for increased tolerance  2. If GI tract not viable, recommend initiation of TPN: recommend 280g Dex + 90g AA + 50g SMOF lipids; provides 1812 kcals, 90g protein, GIR 2.67 mg/kg/min  3. Vitamins/minerals & fluids per team  - consider IV thiamine  4. Monitor chemistry, GI function, skin integrity  5. To follow clinical course & adjust recommendations prn    Education: deferred     Risk Level: High [  X ] Moderate [   ] Low [   ]

## 2023-08-04 NOTE — PRE-ANESTHESIA EVALUATION ADULT - NSANTHPMHFT_GEN_ALL_CORE
67 y/o male with past medical history of type 2 diabetes, hyperlipidemia, iron deficiency anemia, tracheal stenosis s/p tracheostomy, glioblastoma s/p resection 1/27/2022, and Avastin treatment 3/2023 presenting with altered mental status and weakness x2 days. Per family, LKN 6pm on 07/05 when he was at baseline. Patient has intermittent expressive aphasia but is getting worse and now is persistent, and has intermittent nausea and vomiting since 07/05. Patient was scheduled to have outpatient brain MRI on 7/25/2023.  Stroke code called in ED. Head CT shows new hyperdensity in frontal parietal - surgical site. MRI ordered to rule out tumor recurrence.
68 M with pmhx of T2DM, HLD, JAGDISH, hyponatremia, tracheal stenosis s/p tracheostomy, and glioblastoma (s/p cerebral angiogram with IA Avastin treatment in 3/2023) admitted for AMS and weakness, found to have expressive aphasia and imaging findings concerning for tumor recurrence at surgical site. Hospital course complicated by septic shock likely 2/2 K. pneumoniae and possible upper GI bleeding.    Course c/b respiratory failure requiring mechanical ventilation now for trach    Cardiac function normal per echo   Pulm vent dependent hypoxic respitatory failure  Platelets 67, trending.  Hyponatremia corrected  CTX for kleb pna

## 2023-08-04 NOTE — PROGRESS NOTE ADULT - SUBJECTIVE AND OBJECTIVE BOX
INTERVAL HISTORY: HPI:  Osiel Norwood is a 69YO male with a past medical history of type 2 diabetes, hyperlipidemia, iron deficiency anemia, tracheal stenosis s/p tracheostomy (which was closed by ENT 7 days ago); glioblastoma s/p resection 1/27/2022, and Avastin treatment 3/2023 presenting with altered mental status and weakness x2 days. Per family, they last saw him last night around 6pm when he was at baseline. Patient has intermittent expressive aphasia but is getting worse and now is persistent, and has intermittent nausea and vomiting since last night. Patient was supposed to receive an outpatient brain MRI on 7/25/2023.  Stroke code performed in  is negative for CVA. Head CT shows new hyperdensity in frontal parietal - surgical site. MRI is ordered to rule out tumor recurrence.   Per daughter at bedside, Entresto and Eliquis were stopped per his PC - cardiologist.     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  Type 2 diabetes mellitus  Hyperlipidemia  Iron deficiency anemia  Nephrolithiasis  Thrombocytopenia  Hyponatremia  BPH (benign prostatic hyperplasia)  S/P craniotomy  H/O tracheostomy      REVIEW OF SYSTEMS: [x] Unable to Assess due to neurologic exam   [ ] All ROS addressed below are non-contributory, except:  Neuro: [ ] Headache [ ] Back pain [ ] Numbness [ ] Weakness [ ] Ataxia [ ] Dizziness [ ] Aphasia [ ] Dysarthria [ ] Visual disturbance  Resp: [ ] Shortness of breath/dyspnea, [ ] Orthopnea [ ] Cough  CV: [ ] Chest pain [ ] Palpitation [ ] Lightheadedness [ ] Syncope  Renal: [ ] Thirst [ ] Edema  GI: [ ] Nausea [ ] Emesis [ ] Abdominal pain [ ] Constipation [ ] Diarrhea  Hem: [ ] Hematemesis [ ] bright red blood per rectum  ID: [ ] Fever [ ] Chills [ ] Dysuria  ENT: [ ] Rhinorrhea    PHYSICAL EXAM:    General: No Acute Distress, intubated   Neurological: grimace to pain, not following commands, eyes not open to nox, pupils 3mm reactive & midline b/l, WD all extremities to nox, localize b/l UE to nox, +cough/+gag,   Pulmonary: Clear to Auscultation, No Rales, No Rhonchi, No Wheezes   Cardiovascular: S1, S2, Regular Rate and Rhythm   Gastrointestinal: Soft, Nontender, Nondistended   Extremities: No edema       ICU Vital Signs Last 24 Hrs  T(C): 37 (04 Aug 2023 18:19), Max: 37.1 (04 Aug 2023 01:11)  T(F): 98.6 (04 Aug 2023 18:19), Max: 98.7 (04 Aug 2023 01:11)  HR: 101 (04 Aug 2023 21:25) (92 - 103)  BP: 101/57 (04 Aug 2023 21:00) (91/52 - 142/71)  BP(mean): 72 (04 Aug 2023 21:00) (66 - 79)  ABP: 120/52 (04 Aug 2023 21:00) (110/46 - 131/55)  ABP(mean): 74 (04 Aug 2023 21:00) (66 - 83)  RR: 18 (04 Aug 2023 21:25) (14 - 19)  SpO2: 100% (04 Aug 2023 21:25) (97% - 100%)      08-03-23 @ 07:01  -  08-04-23 @ 07:00  --------------------------------------------------------  IN: 2274 mL / OUT: 2520 mL / NET: -246 mL    08-04-23 @ 07:01  -  08-04-23 @ 23:11  --------------------------------------------------------  IN: 1430 mL / OUT: 1550 mL / NET: -120 mL        Mode: AC/ CMV (Assist Control/ Continuous Mandatory Ventilation), RR (machine): 14, TV (machine): 400, FiO2: 40, PEEP: 5, ITime: 1, MAP: 8, PIP: 15    acetaminophen     Tablet .. 650 milliGRAM(s) Oral every 6 hours PRN  atorvastatin 20 milliGRAM(s) Oral at bedtime  chlorhexidine 0.12% Liquid 15 milliLiter(s) Oral Mucosa every 12 hours  chlorhexidine 2% Cloths 1 Application(s) Topical <User Schedule>  dexAMETHasone  Injectable 2 milliGRAM(s) IV Push every 12 hours  escitalopram 5 milliGRAM(s) Oral daily  fentaNYL    Injectable 25 MICROGram(s) IV Push every 2 hours PRN  insulin glargine Injectable (LANTUS) 20 Unit(s) SubCutaneous at bedtime  insulin lispro (ADMELOG) corrective regimen sliding scale   SubCutaneous every 6 hours  lacosamide IVPB 50 milliGRAM(s) IV Intermittent every 12 hours  metoclopramide Injectable 10 milliGRAM(s) IV Push every 8 hours  metroNIDAZOLE  IVPB 500 milliGRAM(s) IV Intermittent every 8 hours  multivitamin 1 Tablet(s) Oral daily  norepinephrine Infusion 0.05 MICROgram(s)/kG/Min (6.81 mL/Hr) IV Continuous <Continuous>  ondansetron Injectable 4 milliGRAM(s) IV Push every 6 hours PRN  pantoprazole  Injectable 40 milliGRAM(s) IV Push every 12 hours  sodium chloride 3 Gram(s) Oral every 6 hours  sodium chloride 0.9%. 1000 milliLiter(s) (50 mL/Hr) IV Continuous <Continuous>  sucralfate suspension 1 Gram(s) Oral every 6 hours      LABS:  Na: 137 (08-04 @ 02:39), 139 (08-03 @ 05:20), 141 (08-02 @ 04:44)  K: 3.6 (08-04 @ 02:39), 3.6 (08-03 @ 05:20), 3.4 (08-02 @ 04:44)  Cl: 109 (08-04 @ 02:39), 108 (08-03 @ 05:20), 112 (08-02 @ 04:44)  CO2: 22 (08-04 @ 02:39), 24 (08-03 @ 05:20), 25 (08-02 @ 04:44)  BUN: 14 (08-04 @ 02:39), 18 (08-03 @ 05:20), 16 (08-02 @ 04:44)  Cr: 0.44 (08-04 @ 02:39), 0.47 (08-03 @ 05:20), 0.56 (08-02 @ 04:44)  Glu: 168(08-04 @ 02:39), 163(08-03 @ 05:20), 152(08-02 @ 04:44)    Hgb: 7.3 (08-04 @ 14:45), 7.9 (08-04 @ 02:39), 8.4 (08-03 @ 17:14), 7.5 (08-03 @ 05:20), 7.1 (08-02 @ 23:15), 7.6 (08-02 @ 16:39), 7.7 (08-02 @ 04:44)  Hct: 22.0 (08-04 @ 14:45), 24.3 (08-04 @ 02:39), 25.1 (08-03 @ 17:14), 22.3 (08-03 @ 05:20), 21.7 (08-02 @ 23:15), 22.6 (08-02 @ 16:39), 23.1 (08-02 @ 04:44)  WBC: 2.28 (08-04 @ 14:45), 2.24 (08-04 @ 02:39), 2.42 (08-03 @ 17:14), 3.18 (08-03 @ 05:20), 2.92 (08-02 @ 23:15), 3.20 (08-02 @ 16:39), 3.43 (08-02 @ 04:44)  Plt: 94 (08-04 @ 14:45), 55 (08-04 @ 02:39), 59 (08-03 @ 17:14), 67 (08-03 @ 05:20), 69 (08-02 @ 23:15), 80 (08-02 @ 16:39), 78 (08-02 @ 04:44)    INR: 1.27 08-04-23 @ 02:39  PTT: 26.1 08-04-23 @ 02:39

## 2023-08-04 NOTE — PROGRESS NOTE ADULT - SUBJECTIVE AND OBJECTIVE BOX
INFECTIOUS DISEASES CONSULT FOLLOW-UP NOTE    INTERVAL HPI/OVERNIGHT EVENTS:    Patient seen and examined at bedside. ANA LILIA. Intubated and sedated, on pressors. Unable to obtain ROS. Going for tracheostomy today    ROS:   Constitutional, eyes, ENT, cardiovascular, respiratory, gastrointestinal, genitourinary, integumentary, neurological, psychiatric and heme/lymph are otherwise negative other than noted above       ANTIBIOTICS/RELEVANT:    MEDICATIONS  (STANDING):  atorvastatin 20 milliGRAM(s) Oral at bedtime  cefTRIAXone   IVPB 1000 milliGRAM(s) IV Intermittent every 24 hours  chlorhexidine 0.12% Liquid 15 milliLiter(s) Oral Mucosa every 12 hours  chlorhexidine 2% Cloths 1 Application(s) Topical <User Schedule>  dexAMETHasone  Injectable 2 milliGRAM(s) IV Push every 12 hours  escitalopram 5 milliGRAM(s) Oral daily  insulin glargine Injectable (LANTUS) 20 Unit(s) SubCutaneous at bedtime  insulin lispro (ADMELOG) corrective regimen sliding scale   SubCutaneous every 6 hours  lacosamide IVPB 50 milliGRAM(s) IV Intermittent every 12 hours  metroNIDAZOLE  IVPB      metroNIDAZOLE  IVPB 500 milliGRAM(s) IV Intermittent every 8 hours  multivitamin 1 Tablet(s) Oral daily  norepinephrine Infusion 0.05 MICROgram(s)/kG/Min (6.81 mL/Hr) IV Continuous <Continuous>  pantoprazole  Injectable 40 milliGRAM(s) IV Push every 12 hours  sodium chloride 3 Gram(s) Oral every 6 hours  sodium chloride 0.9%. 1000 milliLiter(s) (50 mL/Hr) IV Continuous <Continuous>  sucralfate suspension 1 Gram(s) Oral every 6 hours    MEDICATIONS  (PRN):  acetaminophen     Tablet .. 650 milliGRAM(s) Oral every 6 hours PRN Temp greater or equal to 38C (100.4F), Mild Pain (1 - 3)  fentaNYL    Injectable 25 MICROGram(s) IV Push every 2 hours PRN vent dysynchrony  ondansetron Injectable 4 milliGRAM(s) IV Push every 6 hours PRN Nausea and/or Vomiting        Vital Signs Last 24 Hrs  T(C): 37 (04 Aug 2023 11:52), Max: 38 (03 Aug 2023 17:58)  T(F): 98.6 (04 Aug 2023 08:52), Max: 100.4 (03 Aug 2023 17:58)  HR: 98 (04 Aug 2023 11:52) (85 - 101)  BP: 105/60 (04 Aug 2023 11:52) (87/52 - 142/71)  BP(mean): 78 (04 Aug 2023 11:52) (64 - 85)  RR: 18 (04 Aug 2023 11:52) (14 - 19)  SpO2: 99% (04 Aug 2023 11:52) (99% - 100%)    Parameters below as of 04 Aug 2023 11:52    O2 Flow (L/min): 40  O2 Concentration (%): 40    08-03-23 @ 07:01  -  08-04-23 @ 07:00  --------------------------------------------------------  IN: 2294 mL / OUT: 2520 mL / NET: -226 mL    08-04-23 @ 07:01  -  08-04-23 @ 12:04  --------------------------------------------------------  IN: 50 mL / OUT: 75 mL / NET: -25 mL      PHYSICAL EXAM:  Constitutional: alert, NAD  Eyes: the sclera and conjunctiva were normal.   ENT: the ears and nose were normal in appearance. Intubated, NGT  Neck: the appearance of the neck was normal and the neck was supple.   Pulmonary: no respiratory distress and lungs were clear to auscultation bilaterally.   Heart: heart rate was normal and rhythm regular, normal S1 and S2  Vascular:. there was no peripheral edema  Abdomen: normal bowel sounds, soft, non-tender  Neurological: no focal deficits.   Psychiatric: the affect was normal        LABS:                        7.9    2.24  )-----------( 55       ( 04 Aug 2023 02:39 )             24.3     08-04    137  |  109<H>  |  14  ----------------------------<  168<H>  3.6   |  22  |  0.44<L>    Ca    7.0<L>      04 Aug 2023 02:39  Phos  2.4     08-04  Mg     1.8     08-04      PT/INR - ( 04 Aug 2023 02:39 )   PT: 14.4 sec;   INR: 1.27          PTT - ( 04 Aug 2023 02:39 )  PTT:26.1 sec  Urinalysis Basic - ( 04 Aug 2023 02:39 )    Color: x / Appearance: x / SG: x / pH: x  Gluc: 168 mg/dL / Ketone: x  / Bili: x / Urobili: x   Blood: x / Protein: x / Nitrite: x   Leuk Esterase: x / RBC: x / WBC x   Sq Epi: x / Non Sq Epi: x / Bacteria: x        MICROBIOLOGY:      RADIOLOGY & ADDITIONAL STUDIES:  Reviewed

## 2023-08-04 NOTE — PROGRESS NOTE ADULT - ASSESSMENT
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  He was medically managed.  Course c/b septic shock due to K.pneumo bacteremia, patient has +FOBT, Hgb drop from 9 to 6.7, and billous vomiting, suspect due to gut translocation in setting of GIB.  CT C/A/P with ? infectious vs inflammatory colitis. Patient not having diarrhea. Kleb pneumo sensitive to CTX on susceptibilities. EGD 8/1 showed erosive esophagitis- per GI, anemia/emesis likely 2/2 exacerbation of esophagitis. Afebrile this AM, UA neg and BCXs 8/3 ngtd     Suggest:  -F/u Bcxs 7/30, 7/31, 8/3  -cont CTX 1g IV q24h plus Flagyl 500 mg IV Q8h through 8/5 to complete 7d course from first negative blood cultures which were on 7/30      Team 2 signing off. Thank you for your consultation   Please reconsult with questions   Case d/w primary team.  Final recommendation pending attending note.    Kelsey Jolley, Infectious Diseases PA  Please reach out for any questions 9 am-5pm. For evenings and weekends, please call the ID physician on call.  Work cell: [phone]

## 2023-08-05 NOTE — PROGRESS NOTE ADULT - ASSESSMENT
68m hx GBM s/p resection, recent chemo, type 2 DM, admit septic shock 2/2 klebsiella bacteremia & PNA; s/p EGD - severe erosive esophagitis     c/w Protonix BID, Carafate suspension;  -h/h stable  -neuro check q4  -s/p ceftriaxone & flagyl for PNA & bacteremia  -recheck residual feeds  -lantus 20U  -urinary retention; gonzalez placed by urology 8/3  -start metamucil & probiotics for loose stools

## 2023-08-05 NOTE — PROGRESS NOTE ADULT - SUBJECTIVE AND OBJECTIVE BOX
HPI:  Osiel Norwood is a 67YO male with a past medical history of type 2 diabetes, hyperlipidemia, iron deficiency anemia, tracheal stenosis s/p tracheostomy (which was closed by ENT 7 days ago); glioblastoma s/p resection 2022, and Avastin treatment 3/2023 presenting with altered mental status and weakness x2 days. Per family, they last saw him last night around 6pm when he was at baseline. Patient has intermittent expressive aphasia but is getting worse and now is persistent, and has intermittent nausea and vomiting since last night. Patient was supposed to receive an outpatient brain MRI on 2023.  Stroke code performed in  is negative for CVA. Head CT shows new hyperdensity in frontal parietal - surgical site. MRI is ordered to rule out tumor recurrence.   Per daughter at bedside, Entresto and Eliquis were stopped per his PC - cardiologist.      (2023 13:10)    Hospital Course:  : Admitted. Na 115 in ED with repeat 118, started 3% at 30cc/hr, and salt tabs 3q6, stability CTH in ED showing Interval development of a 13 mm hyperdense nodule along the   inferior margin of the resection cavity which may represent progression   of disease with hemorrhage, repeat CTH stable, urine studies ordered  : Neuro stable, 12AM BMP Na114 3% increased to 40cc/hr, urine studies, pancx to r/o infection since pt is immunocompromised. Changed pantoprazole to sucralfate for GI ppx d/t thrombocytopenia. LE dopplers negative. DC'd 3%. ENT consulted to assess stoma, recommend follow up upon discharge with ENT, Repeat sodium 122. Restarted 3% at 30.  : ANA LILIA o/n neuro stable. remains on 3%@30cc/hr. Started florinef, increased 3% to 50cc/hr. Increased 3% to 75cc/hr.    7/10: continued current 3% rate o/n. Na 127 --> 125, cont 3% @75cc/hr, f/u repeat Na this evening, likely stepdown tomorrow. Pend dispo home with home PT/OT when able. Heme consulted for thrombocytopenia  : ANA LILIA o/n. Remains on 3% @75cc/hr. Decreased 3% to 50cc/hr. Started 1.2L fluid restriction. Repeat Na corrected 128  : ANA LILIA overnight, remains on 3%, SDU from ICU  : ANA LILIA overnight, neuro stable, on 3%@50, Am sodium 137, decreased 3% to 25cc/hr, repeat Na 138, decreased to 15cc/hr.  : ANA LILIA overnight, neuro stable, remains on 3%@15cc/hr  7/15: ANA LILIA overnight. Neuro exam stable. Pt remains on 3% saline.Sodium 134 this AM remains on 3%@25. Per nephrology recs hypertonic Na d'ceed, Na tabs 2q6, URENA 15g BID, cont florinef/ fluid restriction.Per nephrology Na >130 ok when ready for d/c   : ANA LILIA overnight, hypertonics d/c now on urea powder/1L fluid restriction/florinef and salt tabs per renal, hydrocortisone cream ordered for rash on back, rec for Home PT  : ANA LILIA overnight, following Na, renal following, ENT saw for hoarse voice rec followup with CT surgery for possible dilation, pending Home PT. Patient developed sudden onset severe headache. Hyperventilating with increased work of breathing. Wheezing in all lung fields to auscultation. Neuro intact on exam. Duoneb x1 ordered. Dilaudid 0.25 IV given for pain control. Subsequently patient developed nausea with multiple episodes of vomiting. Hypertensive with SBP in the 190s. Given hydralazine 10mg IVP, Zofran. Stroke code and rapid response called. STAT labs sent. CTH/CTA/CTP completed. Tachycardic to the 180s, consistent with SVT, remained hypertensive. Given 10 of labetalol, SVT broke. Transferred to Telemetry. WBC 23.43, lactate 8.5. Given 1L fluid bolus. Pan culture sent. Started empirically on Vanc/Meropenem.   : Put on eeg. Voiding while retaining urine, SC for 500cc. EEG +spikes, epilepsy consulted. CTA chest and CT A/P pending. Keppra increased 1g BID.   : Cont EEG, trend Na, possible NGT today if not able to tolerate PO. Blood cx growing GS + cocci in clusters, f/u MRSA swab and pan cx, cont meropenem and vancomycin. Vanc trough in am. F/u urine studies.   : Off EEG, neuro exam improved. ID following for concern for sepsis, likely aerobic blood cx bottle staph epi contaminant, cont monitoring off of abx, possible drug reaction as cause. F/u surveillance blood cx. Hyponatremia, repeat Na 129 overnight from 130, cont current regimen and f/u am labs and nephrology recs. AM na 129 continuing fluid restriction, nephro recommending restarting urena, but holding due to possible drug reaction previously.   : neurologically stable, c/w fluid restriction, f/u AM Na, urine osm, urine Na, cortisol. Given 15mg of tolvaptan. Fluid restriction, florinef d/c'd per nephro recs. Held salt tabs for today. 6pm BMP Na 122, 10pm BMP Na 131, urine osm 141  : ANA LILIA ovn, neuro stable. AM Na 133. Restarted salt tabs 3q6 and 1L fluid restriction. Lantus 10u at bedtime added. 9:30pm BMP per nephro Na 138  : ANA LILIA ovn, neuro stable. Na stable, discussed with nephrology can cont tolvaptan 15mg daily PRN for hyponatremia, only needed for hypoNa, can discontinue fluid restriction. Pt evaluated at bedside after nurse noted patient to have some expressive aphasia and perseverating; patient oriented to self, unable to say hospital or year, following all commands and DUMONT 5/5 strength, no drift, no facial droop, perseverating on pt's  when asked other questions. Pt afebrile, vitals stable, cont keppra 1g BID, cont decadron 4mg BID, discussed with Dr. Dorado, defer CTH at this time and monitor clinically. . Lantus increased to 14u at bedtime.  : o/n PSVT 13 beats followed by sinus tachycardia in setting of anxiety and net negative fluid balance.  Resolved to 102 with verbal comfort and further resolved with 500 cc NS. Lantus increased to 18u qhs. Dr. Costa consulted.  : ANA LILIA overnight. Neuro stable.   : ANA LILIA overnight. Neuro exam stable. Dispo pending.  : multiple BMs o/n. Na stable. Lexapro increased to 10mg daily for worsening depression. Held bowel regimen 2/2 multiple stools.   : ANA LILIA overnight, Na 133 f/u AM labs, pending [place]. Pt has episode of sustained tachycardia, c/o feeling warm, and dizzy while working with Pt. Given 500cc NS bolus, rectal temp was 99F. Family brought in medical marijuana. Pt had rectal temp 100.9, pancultured. Given another 1L bolus and started on maintenance fluids. Rectal temp 102. Started empirically on vanc/cipro/flagyl. Upgraded to telemetry.  : In AM patient had episode of emesis and was unresponsive to sternal rub. Dr. Huggins, neurointensivist at bedside. Repeat BP 60s/40s, tachycardic to 110s. Rapid response called. Levo gtt started. Upgraded to ICU. Intubated for airway protection. Likely septic shock given GNR in blood cx growing K.pneumoniae, Given additional 1L bolus x2. Abx changed to vanc and meropenem, ID following, rec to d/c vanc, c/w meropenem [id number], FOBT positive, keppra changed to vimpat 50BID, fentanyl 25mg q4hrs prn for vent dysynchrony, home lexapro decreased to 5mg, decadron decreased to 2q12, sucralfate increased to 1q6 iso GI bleed, pending repeat CBC, CMP, ABG, DIC labs, xray abdomen to r/o obstruction, propofol gtt started for sedation, weaning off precedex gtt, GI consulted rec protonix 40bid iso possible GI bleed, plan for possible EGD, albumin 1.7, 25% albumin 50ml given, s/p 1u PRBC and PLT repeat cbc hgb 8.0 from 6.6 and PLT 88 from 55. Protonix gtt started per GI recs.   : ANA LILIA overnight. Neuro stable. Hemoglobin 6.5, platelets 47 o/n, transfused 1uPRBC, 1u platelets in AM. Repeat Hb 7.1 and platelets 81. CBC repeated and Hb 7.2 and platelets 74. Arnie test negative. 1 set of surveillance blood cultures sent. NS increased to 120cc/hr. Holding off on CT C/A/P per nephrology recs due to concern about IV contrast. Temp 100.8 F, given tylenol. Additional 1u PRBC given in evening for Hb 6.8.   : 1u prbc given in am. 6 units lantus started at bedtime. Per ID stopped meropenam and started ceftriaxone 1qd and flagyl 500 q8.  Plan for EGD tomorrow with GI. Blood and urine cx sent per ID recs.   : Tachycardic, gave 20 IV lasix. Hgb at the time stable. NS@20 for renal protection. lantus increased to 14u. Lactate normalized. Given 500cc bolus for soft pressures, restarted levo gtt temporarily, now off. Endoscopy done with GI showed severe circumferential esophagitis, small ulcer to anterior stomach. propofol changed to precedex.   : ANA LILIA overnight, remains sedated on precedex and intubated on ACVC.  Pt tolerating CPAP. Campuzano removed, failed TOV, and straight catheterized. Trickle feeds started 10cc/hr. Changed protonix gtt transitioned to 40mg IV BID per GI. ENT consulted for trach planning.  8/3: Remains on levo. Campuzano replaced by urology due to difficulty straight catheterizing. TF held for high residuals. 1u PRBC transfused. 250cc albumin and 500cc NS bolus. Plan for trach with ENT .  : ANA LILIA overnight. Neuro exam stable. Off levo since 12am. POD 0 trach with ENT. Increased Lantus to 20u 2/2 high FS. +RT 2/2 multiple loose stools. Nephrology consulted 2/2 urine appearance, NTD from their standpoint. S/p 2u platelets and 1u PRBCs.  : High residuals of TF, reglan 10IV q8h started. Neuro exam stable.       Vital Signs Last 24 Hrs  T(C): 37.8 (05 Aug 2023 00:58), Max: 37.8 (05 Aug 2023 00:58)  T(F): 100 (05 Aug 2023 00:58), Max: 100 (05 Aug 2023 00:58)  HR: 104 (05 Aug 2023 00:00) (92 - 108)  BP: 103/58 (05 Aug 2023 00:00) (91/52 - 142/71)  BP(mean): 75 (05 Aug 2023 00:00) (66 - 79)  RR: 18 (04 Aug 2023 21:25) (14 - 19)  SpO2: 100% (05 Aug 2023 00:00) (97% - 100%)    Parameters below as of 05 Aug 2023 00:00  Patient On (Oxygen Delivery Method): ventilator    O2 Concentration (%): 40    I&O's Summary    03 Aug 2023 07:01  -  04 Aug 2023 07:00  --------------------------------------------------------  IN: 2274 mL / OUT: 2520 mL / NET: -246 mL    04 Aug 2023 07:01  -  05 Aug 2023 01:12  --------------------------------------------------------  IN: 1620 mL / OUT: 2050 mL / NET: -430 mL    PHYSICAL EXAM:  General: patient seen laying supine in bed in NAD  Neuro: OE to noxious, RUE/LUE localizes, b/l LE TF, +cough/gag  Neck: supple  Cardiac: RRR, S1S2  Pulmonary: chest rise symmetric  Abdomen: soft, nontender, nondistended  Ext: perfusing well, +petechiae abdomen, b/l extremities and scrotum   Skin: warm, dry    TUBES/LINES:  [ ] L radial bassam ( - )   [ ] R IJ Central line ( - )   [ ] Campuzano (8/3- )    DIET:  [] NPO  [] Mechanical  [x] Tube feeds - NGT     LABS:                        8.4    2.51  )-----------( 99       ( 04 Aug 2023 23:21 )             25.0     08-04    137  |  109<H>  |  14  ----------------------------<  168<H>  3.6   |  22  |  0.44<L>    Ca    7.0<L>      04 Aug 2023 02:39  Phos  2.4     08-04  Mg     1.8     08-04      PT/INR - ( 04 Aug 2023 02:39 )   PT: 14.4 sec;   INR: 1.27          PTT - ( 04 Aug 2023 02:39 )  PTT:26.1 sec  Urinalysis Basic - ( 04 Aug 2023 02:39 )    Color: x / Appearance: x / SG: x / pH: x  Gluc: 168 mg/dL / Ketone: x  / Bili: x / Urobili: x   Blood: x / Protein: x / Nitrite: x   Leuk Esterase: x / RBC: x / WBC x   Sq Epi: x / Non Sq Epi: x / Bacteria: x          CAPILLARY BLOOD GLUCOSE      POCT Blood Glucose.: 174 mg/dL (05 Aug 2023 00:18)  POCT Blood Glucose.: 188 mg/dL (04 Aug 2023 17:11)  POCT Blood Glucose.: 180 mg/dL (04 Aug 2023 12:19)  POCT Blood Glucose.: 167 mg/dL (04 Aug 2023 06:26)      Drug Levels: [] N/A    CSF Analysis: [] N/A      Allergies    amoxicillin (Rash)  ure-Na (Rash; Fever; Hypotension)    Intolerances      MEDICATIONS:  Antibiotics:  cefTRIAXone   IVPB 1000 milliGRAM(s) IV Intermittent every 24 hours  metroNIDAZOLE  IVPB 500 milliGRAM(s) IV Intermittent every 8 hours    Neuro:  acetaminophen     Tablet .. 650 milliGRAM(s) Oral every 6 hours PRN  escitalopram 5 milliGRAM(s) Oral daily  fentaNYL    Injectable 25 MICROGram(s) IV Push every 2 hours PRN  lacosamide IVPB 50 milliGRAM(s) IV Intermittent every 12 hours  metoclopramide Injectable 10 milliGRAM(s) IV Push every 8 hours    Anticoagulation:    OTHER:  atorvastatin 20 milliGRAM(s) Oral at bedtime  chlorhexidine 0.12% Liquid 15 milliLiter(s) Oral Mucosa every 12 hours  chlorhexidine 2% Cloths 1 Application(s) Topical <User Schedule>  dexAMETHasone  Injectable 2 milliGRAM(s) IV Push every 12 hours  insulin glargine Injectable (LANTUS) 20 Unit(s) SubCutaneous at bedtime  insulin lispro (ADMELOG) corrective regimen sliding scale   SubCutaneous every 6 hours  pantoprazole  Injectable 40 milliGRAM(s) IV Push every 12 hours  sucralfate suspension 1 Gram(s) Oral every 6 hours    IVF:  multivitamin 1 Tablet(s) Oral daily  sodium chloride 3 Gram(s) Oral every 6 hours  sodium chloride 0.9%. 1000 milliLiter(s) IV Continuous <Continuous>    CULTURES:  Culture Results:   No growth at 1 day. ( @ 14:21)  Culture Results:   No growth at 1 day. ( @ 14:21)    RADIOLOGY & ADDITIONAL TESTS:      ASSESSMENT:  69 YO male PMH T2DM, HLD, JAGDISH, tracheal stenosis s/p tracheostomy (s/p closure); glioblastoma s/p resection 2022, and [place] Avastin treatment 3/2023 adm with aphasia, N/V and severe hypoNa (118), imaging showing disesase progression, Hospital course complicated by sepsis secondary to bacteremia, decompensated, intubated, started on antibiotics and upgrated to ICU on . S/p trach with ENT (23).     PLAN:  Neuro:  -neuro q4h/vitals q1hrs   -CTH : Interval development of a 13 mm hyperdense nodule along the inferior margin of the resection cavity which may represent progression of disease with hemorrhage. Repeat CTH stable, CTH  stable   -CTA : negative, CTA  stable  -CTP  stable.   -pain control prn, fentanyl IVP prn  -vimpat 50mg BID (switched from keppra  thrombocytopenia) for seizure tx  -Decadron 2mg BID  -c/w home Lexapro 5mg (home dose 5mg)  -home ASA 81 held i/s/o thrombocytopenia   -MRI brain  suspicious for recurrent tumor and hydro   -palliative following     Pulm:  - Trach to full vent support (6 Shiley)    Cardio:  - MAP goal >65, off levo  -  outpatient echo EF 65%  - echo : EF 60-65%  -  qtc 502    GI:  - TF Vital 1.0 @ 10cc/hr per NGT + Banatrol   - +RT  2/2 multiple loose stools  - protonix BID and carafate 1g q6h for upper GI bleed  - GI following, s/p EGD : severe circumferential esophagitis, small ulcer to anterior stomach  - reglan 10IV q8h x1d    Renal/:  - Hyponatremic (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 as well as SSRI use), s/p tolvaptan ; currently on NaCl tabs 3g q6h  - NS @ 50cc/hr while NPO  - Campuzano replaced 8/3 by urology for difficult straight cath    Endo:  -A1c 5.7  -lantus 20u qhs, ISS  -h/o T2DM: home Januvia held  -h/o HLD: c/w Atorvastatin 20mg    Heme:  -SCDs for DVT ppx, SQL and Aspirin 81 held for thrombocytopenia   -heme consulted for thrombocytopenia- w/u sent, likely chronic thrombocytopenia d/t chemotherapy agent in past   -Per heme transfuse 1U plt if bleeding and platelet count <50k, if febrile and platelet count <20k. Hold transfusing platelets until tomorrow before procedure.   -1u PRBC, 1U PLT , 2u PRBC, 1u PLT , 1u PRBC 8/3, 2u PLT , 1u PRBC   -LE dopplers  negative  -LUE doppler : L superficial cephalic vein thrombophlebitis  -CTA PE protocol for tachycardia: negative     ID:  - Pancultured , bcx growing K. pneumoniae, sputum cx K. pneumoniae, f/u blood cx   - meropenem 1g q8hrs (-), changed to Ceftriaxone/Flagyl per ID recs (-)  - CT CAP : Wall thickening in the colon suggesting an infectious or inflammatory colitis, Suspected small duodenal ulcer with surrounding edema in, Distended gallbladder w/o cholecystitis. Mild ascites. Bibasilar pneumonia L>R.  -Febrile, leukocytosis and tachycardia with elevated lactic acid on   -vancomycin/meropenem empirically (-), d/c per ID and repeat blood cx , NGTD    Dispo: NSICU status, full code, AR    D/w Dr. Dorado and Dr. Lerma

## 2023-08-05 NOTE — PROGRESS NOTE ADULT - SUBJECTIVE AND OBJECTIVE BOX
INTERVAL HISTORY: HPI:  Osiel Norwood is a 69YO male with a past medical history of type 2 diabetes, hyperlipidemia, iron deficiency anemia, tracheal stenosis s/p tracheostomy (which was closed by ENT 7 days ago); glioblastoma s/p resection 1/27/2022, and Avastin treatment 3/2023 presenting with altered mental status and weakness x2 days. Per family, they last saw him last night around 6pm when he was at baseline. Patient has intermittent expressive aphasia but is getting worse and now is persistent, and has intermittent nausea and vomiting since last night. Patient was supposed to receive an outpatient brain MRI on 7/25/2023.  Stroke code performed in  is negative for CVA. Head CT shows new hyperdensity in frontal parietal - surgical site. MRI is ordered to rule out tumor recurrence.   Per daughter at bedside, Entresto and Eliquis were stopped per his PC - cardiologist.     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  Type 2 diabetes mellitus  Hyperlipidemia  Iron deficiency anemia  Nephrolithiasis  Thrombocytopenia  Hyponatremia  BPH (benign prostatic hyperplasia)  S/P craniotomy  H/O tracheostomy      REVIEW OF SYSTEMS: [x] Unable to Assess due to neurologic exam   [ ] All ROS addressed below are non-contributory, except:  Neuro: [ ] Headache [ ] Back pain [ ] Numbness [ ] Weakness [ ] Ataxia [ ] Dizziness [ ] Aphasia [ ] Dysarthria [ ] Visual disturbance  Resp: [ ] Shortness of breath/dyspnea, [ ] Orthopnea [ ] Cough  CV: [ ] Chest pain [ ] Palpitation [ ] Lightheadedness [ ] Syncope  Renal: [ ] Thirst [ ] Edema  GI: [ ] Nausea [ ] Emesis [ ] Abdominal pain [ ] Constipation [ ] Diarrhea  Hem: [ ] Hematemesis [ ] bright red blood per rectum  ID: [ ] Fever [ ] Chills [ ] Dysuria  ENT: [ ] Rhinorrhea    PHYSICAL EXAM:    General: No Acute Distress, intubated   Neurological: grimace to pain, not following commands, eyes not open to nox, pupils 3mm reactive & midline b/l, WD all extremities to nox, localize b/l UE to nox, +cough/+gag,   Pulmonary: Clear to Auscultation, No Rales, No Rhonchi, No Wheezes   Cardiovascular: S1, S2, Regular Rate and Rhythm   Gastrointestinal: Soft, Nontender, Nondistended   Extremities: No edema       ICU Vital Signs Last 24 Hrs  T(C): 37.4 (05 Aug 2023 22:06), Max: 38.1 (05 Aug 2023 14:49)  T(F): 99.3 (05 Aug 2023 22:06), Max: 100.6 (05 Aug 2023 14:49)  HR: 87 (05 Aug 2023 21:00) (85 - 106)  BP: 95/54 (05 Aug 2023 21:00) (87/52 - 110/62)  BP(mean): 69 (05 Aug 2023 21:00) (65 - 81)  ABP: 107/46 (05 Aug 2023 21:00) (106/48 - 128/55)  ABP(mean): 65 (05 Aug 2023 21:00) (64 - 81)  RR: 20 (05 Aug 2023 21:00) (1 - 20)  SpO2: 100% (05 Aug 2023 21:00) (94% - 100%)      08-04-23 @ 07:01  -  08-05-23 @ 07:00  --------------------------------------------------------  IN: 2240 mL / OUT: 2875 mL / NET: -635 mL    08-05-23 @ 07:01  -  08-05-23 @ 22:52  --------------------------------------------------------  IN: 1430 mL / OUT: 1375 mL / NET: 55 mL        Mode: PS (Pressure Support)/ Spontaneous, FiO2: 40, PEEP: 8, PS: 10, MAP: 6, PIP: 9    acetaminophen     Tablet .. 650 milliGRAM(s) Oral every 6 hours PRN  acetylcysteine 10%  Inhalation 4 milliLiter(s) Inhalation every 6 hours  albuterol/ipratropium for Nebulization 3 milliLiter(s) Nebulizer every 6 hours  atorvastatin 20 milliGRAM(s) Oral at bedtime  chlorhexidine 0.12% Liquid 15 milliLiter(s) Oral Mucosa every 12 hours  chlorhexidine 2% Cloths 1 Application(s) Topical <User Schedule>  dexAMETHasone  Injectable 2 milliGRAM(s) IV Push every 12 hours  escitalopram 5 milliGRAM(s) Oral daily  fentaNYL    Injectable 25 MICROGram(s) IV Push every 2 hours PRN  insulin glargine Injectable (LANTUS) 20 Unit(s) SubCutaneous at bedtime  insulin lispro (ADMELOG) corrective regimen sliding scale   SubCutaneous every 6 hours  lacosamide IVPB 50 milliGRAM(s) IV Intermittent every 12 hours  midodrine 15 milliGRAM(s) Oral every 8 hours  multivitamin 1 Tablet(s) Oral daily  pantoprazole  Injectable 40 milliGRAM(s) IV Push every 12 hours  petrolatum Ophthalmic Ointment 1 Application(s) Both EYES three times a day  sodium chloride 3 Gram(s) Oral every 6 hours  sodium chloride 0.9%. 1000 milliLiter(s) (50 mL/Hr) IV Continuous <Continuous>  sodium chloride 3%  Inhalation 4 milliLiter(s) Inhalation every 6 hours  sucralfate suspension 1 Gram(s) Oral every 6 hours      LABS:  Na: 134 (08-05 @ 10:51), 137 (08-05 @ 05:02), 137 (08-04 @ 02:39), 139 (08-03 @ 05:20)  K: 3.9 (08-05 @ 10:51), 3.4 (08-05 @ 05:02), 3.6 (08-04 @ 02:39), 3.6 (08-03 @ 05:20)  Cl: 106 (08-05 @ 10:51), 107 (08-05 @ 05:02), 109 (08-04 @ 02:39), 108 (08-03 @ 05:20)  CO2: 22 (08-05 @ 10:51), 23 (08-05 @ 05:02), 22 (08-04 @ 02:39), 24 (08-03 @ 05:20)  BUN: 12 (08-05 @ 10:51), 12 (08-05 @ 05:02), 14 (08-04 @ 02:39), 18 (08-03 @ 05:20)  Cr: 0.39 (08-05 @ 10:51), 0.44 (08-05 @ 05:02), 0.44 (08-04 @ 02:39), 0.47 (08-03 @ 05:20)  Glu: 151(08-05 @ 10:51), 122(08-05 @ 05:02), 168(08-04 @ 02:39), 163(08-03 @ 05:20)    Hgb: 8.7 (08-05 @ 10:51), 8.6 (08-05 @ 05:02), 8.4 (08-04 @ 23:21), 7.3 (08-04 @ 14:45), 7.9 (08-04 @ 02:39), 8.4 (08-03 @ 17:14), 7.5 (08-03 @ 05:20), 7.1 (08-02 @ 23:15)  Hct: 25.4 (08-05 @ 10:51), 24.7 (08-05 @ 05:02), 25.0 (08-04 @ 23:21), 22.0 (08-04 @ 14:45), 24.3 (08-04 @ 02:39), 25.1 (08-03 @ 17:14), 22.3 (08-03 @ 05:20), 21.7 (08-02 @ 23:15)  WBC: 2.40 (08-05 @ 10:51), 2.58 (08-05 @ 05:02), 2.51 (08-04 @ 23:21), 2.28 (08-04 @ 14:45), 2.24 (08-04 @ 02:39), 2.42 (08-03 @ 17:14), 3.18 (08-03 @ 05:20), 2.92 (08-02 @ 23:15)  Plt: 82 (08-05 @ 10:51), 85 (08-05 @ 05:02), 99 (08-04 @ 23:21), 94 (08-04 @ 14:45), 55 (08-04 @ 02:39), 59 (08-03 @ 17:14), 67 (08-03 @ 05:20), 69 (08-02 @ 23:15)    INR: 1.27 08-04-23 @ 02:39  PTT: 26.1 08-04-23 @ 02:39      LIVER FUNCTIONS - ( 05 Aug 2023 10:51 )  Alb: 2.2 g/dL / Pro: 4.4 g/dL / ALK PHOS: 60 U/L / ALT: 21 U/L / AST: 22 U/L / GGT: x

## 2023-08-05 NOTE — CHART NOTE - NSCHARTNOTEFT_GEN_A_CORE
Chief Complaint   Patient presents with    Follow-up     6 weeks    Joint Pain          Have you seen any other physician or provider since your last visit no    Have you had any other diagnostic tests since your last visit? no    Have you changed or stopped any medications since your last visit? Yes stopped Cymbalta     I have recommended that this patient have a immunization for pneumonia but she declines at this time. I have discussed the risks and benefits of this examination with her. The patient verbalizes understanding. Patient is here for a 6 week follow up on joint pain and medication management. Patient is c/o urinary frequency and incontinence. She is getting up 3-4 times a night to urinate. She is requesting bladder medication. High residuals of TF, reglan 10IV q8h started. Neuro exam stable. Duonebs, 3% inhalation, mucomyst standing for secretions. Ordered repeat hemolytic studies. DC'd cental line. Holding TF for high residuals. ABD XR w/ stool burden. SBP in 100s, MAP in the low 60s. Given 250cc of albumin, started midodrine 15q8. Given 500c bolus. RT d/c'd. is no tenderness. There is no guarding. Musculoskeletal:         General: No tenderness. Skin:     General: Skin is warm and dry. Findings: No rash. Neurological:      Mental Status: She is alert and oriented to person, place, and time. Psychiatric:         Judgment: Judgment normal.         No results found for requested labs within last 30 days. Microscopic Examination (no units)   Date Value   10/09/2019 YES     LDL Calculated (mg/dL)   Date Value   08/23/2018 214 (H)           Lab Results   Component Value Date    TSH 0.69 08/23/2018         ASSESSMENT/PLAN:     Guillermina Doherty was seen today for follow-up and joint pain. Diagnoses and all orders for this visit:    Stress incontinence  -     oxybutynin (DITROPAN-XL) 5 MG extended release tablet; Take 1 tablet by mouth daily    Weight gain  Discussed diet options. Arthralgia, unspecified joint  Continue Neurontin. Has appt with Rheumatology.      Medications Discontinued During This Encounter   Medication Reason    DULoxetine (CYMBALTA) 30 MG extended release capsule Side effects

## 2023-08-05 NOTE — PROGRESS NOTE ADULT - ASSESSMENT
ASSESSMENT:   69 y/o M with septic shock secondary to Klebsiella bacteremia   GBM s/p resection, recent chemotherapy  History of Takotsubo cardiomyopathy  Chronic SIADH  Type 2 DM    NEURO:  Neurochecks Q4  Analgesia: Fentanyl prn  Sedation: None  Seizure history: Continue vimpat  Cerebral edema: On dexamethasone 2mg Q12  Activity: Bedrest for now. PT/OT evaluation when stable   Palliative care following    PULMONARY: Currently requiring mechanical ventilation for airway protection   S/P trach. CPAP as tolerated 5/5 40%  CXR, ABG  VAP bundle    CARDIOVASCULAR: Hypotension in setting of likely septic shock and acute blood loss anemia 2/2 suspected GIB  MAP goal 65-75. Off pressors  TTE: EF 60-65%. No WMA, no vegetation  Lactate wnl    GASTROENTEROLOGY: Suspected GI bleed, fecal occult positive.   GI consulted for endoscopy evaluation- 8/1 scope showed esophagitis and non-bleeding ulcer  GI ppx: PPI bid. Continue sucralfate  CT chest/ abd/ pelvis ? small ulcer at first portion of duodenum  Will need PEG eventually    RENAL/: Dark urine; ?myoglobin vs hematuria vs bilirubinemia. Chronic SIADH  Monitor BMP daily  Campuzano; monitor Is/Os  Na goal 135-145; replete lytes   Urology/nephrology following    ENDOCRINE: Diabetes mellitus  A1c- 5.7  ISS while NPO. Lantus 20u qhs. Titrate prn    HEME/ONC: Pancytopenia likely chemo-induced vs sepsis, bone marrow suppression  DIC resulted  Concern for GIB: Trend H/H and plt. Hb goal >7.0, plt goal >50  Plt antibody pending  DVT ppx: Will hold chemical DVT ppx in setting of low platelets/ bleed  B/L SCDs  Heme following    INFECTIOUS: Klebsiella bacteremia ?source- possible translocation  S/P meropenem-> CTX and flagyl (?duration).  ID following  Repeat blood cultures 7/31 and daily. NGTD from 7/30 and 7/31  Culture on 8/3; follow up. Thus far NGTD    MISC:  Palliative care following, appreciate recommendations    SOCIAL/FAMILY:  [] awaiting [x] updated daughter and son-in- law at bedside [] family meeting    CODE STATUS:  [x] Full Code [] DNR [] DNI [] Palliative/Comfort Care    DISPOSITION:  [x] ICU [] Stroke Unit [] Floor [] EMU [] RCU [] PCU    Time spent: 60 critical care minutes     ASSESSMENT:   69 y/o M with septic shock secondary to Klebsiella bacteremia   GBM s/p resection, recent chemotherapy  History of Takotsubo cardiomyopathy  Chronic SIADH  Type 2 DM    NEURO:  Neurochecks Q4  Analgesia: Fentanyl prn  Sedation: None  Seizure history: Continue vimpat  Cerebral edema: On dexamethasone 2mg Q12  Activity: Bedrest for now. PT/OT evaluation when stable   Palliative care following    PULMONARY: Currently requiring mechanical ventilation for airway protection   S/P trach. CPAP as tolerated 5/5 40%  CXR, ABG  VAP bundle  Pulmonary toilet    CARDIOVASCULAR: Hypotension in setting of likely septic shock and acute blood loss anemia 2/2 suspected GIB; resolved.   MAP goal 65-75. Off pressors x 48 hours.  TTE: EF 60-65%. No WMA, no vegetation  Lactate wnl  DC central line    GASTROENTEROLOGY: Esophagitis and non-bleeding ulcer, r/o ileus  GI ppx: PPI bid. Continue sucralfate  CT chest/ abd/ pelvis ? small ulcer at first portion of duodenum  Will need PEG eventually; gen surg  AXR  LBM: Rectal tube in place; monitor output. DC bowel regimen    RENAL/: Dark urine; ?myoglobin vs hematuria vs bilirubinemia. Chronic SIADH, urine retention. Difficult catheterisation.  Monitor BMP daily  Campuzano catheter placed by Urology. Maintain per Urology.   Monitor Is/Os  Na goal 135-145; replete lytes   Urology/nephrology following    ENDOCRINE: Diabetes mellitus  A1c- 5.7  ISS while NPO. Lantus 20u qhs. Titrate prn    HEME/ONC: Pancytopenia likely chemo-induced vs sepsis, bone marrow suppression, R/O hemolytic process  DIC ruled out.   Concern for GIB: Trend H/H and plt. Hb goal >7.0, plt goal >50  Plt antibody pending  DVT ppx: Will hold chemical DVT ppx in setting of low platelets/ bleed  B/L SCDs  Heme following    INFECTIOUS: Klebsiella bacteremia ?source- possible translocation  S/P meropenem-> CTX and flagyl until 8/6  ID following  Repeat blood cultures 7/31 and daily. NGTD from 7/30 and 7/31  Culture on 8/3; follow up. Thus far NGTD.    MISC:  Palliative care following, appreciate recommendations    SOCIAL/FAMILY:  [] awaiting [x] updated daughter and son-in- law at bedside [] family meeting    CODE STATUS:  [x] Full Code [] DNR [] DNI [] Palliative/Comfort Care    DISPOSITION:  [x] ICU [] Stroke Unit [] Floor [] EMU [] RCU [] PCU    Time spent: 60 critical care minutes     ASSESSMENT:   69 y/o M with septic shock secondary to Klebsiella bacteremia   GBM s/p resection, recent chemotherapy  History of Takotsubo cardiomyopathy  Chronic SIADH  Type 2 DM    NEURO:  Neurochecks Q4  Analgesia: Fentanyl prn  Sedation: None  Seizure history: Continue vimpat  Cerebral edema: On dexamethasone 2mg Q12  Activity: Bedrest for now. PT/OT evaluation when stable   Palliative care following    PULMONARY: Currently requiring mechanical ventilation for airway protection   S/P trach. CPAP as tolerated 5/5 40%  CXR, ABG  VAP bundle  Pulmonary toilet    CARDIOVASCULAR: Hypotension in setting of likely septic shock and acute blood loss anemia 2/2 suspected GIB; resolved.   MAP goal 65-75. Off pressors x 48 hours.  TTE: EF 60-65%. No WMA, no vegetation  Lactate wnl  DC central line    GASTROENTEROLOGY: Esophagitis and non-bleeding ulcer, r/o ileus  GI ppx: PPI bid. Continue sucralfate  CT chest/ abd/ pelvis ? small ulcer at first portion of duodenum  Will need PEG eventually; gen surg  Trickle feeds held re concern for ? ileus  AXR 8/5  Reglan x 24 hours  LBM: Rectal tube in place; monitor output. DC bowel regimen  GI following    RENAL/: Dark urine; ?myoglobin vs hematuria vs bilirubinemia. Chronic SIADH, urine retention. Difficult catheterisation.  Monitor BMP daily  Campuzano catheter placed by Urology. Maintain per Urology.   Monitor Is/Os  Na goal 135-145; replete lytes   Urology/nephrology following    ENDOCRINE: Diabetes mellitus  A1c- 5.7  ISS while NPO. Lantus 20u qhs. Titrate prn    HEME/ONC: Pancytopenia likely chemo-induced vs sepsis, bone marrow suppression, R/O hemolytic process  DIC ruled out.   Concern for GIB: Trend H/H and plt. Hb goal >7.0, plt goal >50  Plt antibody pending  DVT ppx: Will hold chemical DVT ppx in setting of low platelets/ bleed  B/L SCDs  Heme following    INFECTIOUS: Klebsiella bacteremia ?source- possible translocation  S/P meropenem-> CTX and flagyl until 8/6  ID following  Repeat blood cultures 7/31 and daily. NGTD from 7/30 and 7/31  Culture on 8/3; follow up. Thus far NGTD.    MISC:  Palliative care following, appreciate recommendations    SOCIAL/FAMILY:  [] awaiting [x] updated daughter and son-in- law at bedside [] family meeting    CODE STATUS:  [x] Full Code [] DNR [] DNI [] Palliative/Comfort Care    DISPOSITION:  [x] ICU [] Stroke Unit [] Floor [] EMU [] RCU [] PCU    Time spent: 60 critical care minutes

## 2023-08-05 NOTE — PROGRESS NOTE ADULT - SUBJECTIVE AND OBJECTIVE BOX
OTOLARYNGOLOGY (ENT) PROGRESS NOTE    PATIENT: JORDAN CHAKRABORTY  MRN: [id number]  : 57  [id number]  DATE OF SERVICE:  23  	  Subjective/ Interval:   Patient is s/p trach yesterday, uncomplicated. No acute events overnight. Trach to vent, cuff inflated. Vent: CPAP during day, overnight A/C with PEEP 5, FiO2 40%.    ALLERGIES:  amoxicillin (Rash)  ure-Na (Rash; Fever; Hypotension)      MEDICATIONS:  Antiinfectives:   metroNIDAZOLE  IVPB 500 milliGRAM(s) IV Intermittent every 8 hours    IV fluids:  multivitamin 1 Tablet(s) Oral daily  sodium chloride 3 Gram(s) Oral every 6 hours  sodium chloride 0.9%. 1000 milliLiter(s) IV Continuous <Continuous>    Hematologic/Anticoagulation:    Pain medications/Neuro:  acetaminophen     Tablet .. 650 milliGRAM(s) Oral every 6 hours PRN  escitalopram 5 milliGRAM(s) Oral daily  fentaNYL    Injectable 25 MICROGram(s) IV Push every 2 hours PRN  lacosamide IVPB 50 milliGRAM(s) IV Intermittent every 12 hours  metoclopramide Injectable 10 milliGRAM(s) IV Push every 8 hours    Endocrine Medications:   atorvastatin 20 milliGRAM(s) Oral at bedtime  dexAMETHasone  Injectable 2 milliGRAM(s) IV Push every 12 hours  insulin glargine Injectable (LANTUS) 20 Unit(s) SubCutaneous at bedtime  insulin lispro (ADMELOG) corrective regimen sliding scale   SubCutaneous every 6 hours    All other standing medications:   acetylcysteine 10%  Inhalation 4 milliLiter(s) Inhalation every 6 hours  albuterol/ipratropium for Nebulization 3 milliLiter(s) Nebulizer every 6 hours  chlorhexidine 0.12% Liquid 15 milliLiter(s) Oral Mucosa every 12 hours  chlorhexidine 2% Cloths 1 Application(s) Topical <User Schedule>  pantoprazole  Injectable 40 milliGRAM(s) IV Push every 12 hours  sodium chloride 3%  Inhalation 4 milliLiter(s) Inhalation every 6 hours  sucralfate suspension 1 Gram(s) Oral every 6 hours    All other PRN medications:    Vital Signs Last 24 Hrs  T(C): 37.4 (05 Aug 2023 05:53), Max: 38.2 (04 Aug 2023 22:00)  T(F): 99.4 (05 Aug 2023 05:53), Max: 100.7 (04 Aug 2023 22:00)  HR: 95 (05 Aug 2023 10:00) (93 - 108)  BP: 101/56 (05 Aug 2023 10:00) (93/50 - 110/63)  BP(mean): 73 (05 Aug 2023 10:00) (66 - 81)  RR: 16 (05 Aug 2023 09:00) (14 - 18)  SpO2: 98% (05 Aug 2023 10:00) (94% - 100%)    Parameters below as of 05 Aug 2023 09:00  Patient On (Oxygen Delivery Method): ventilator, CPAP    O2 Concentration (%): 40       @ :  -   @ 07:00  --------------------------------------------------------  IN:    Enteral Tube Flush: 90 mL    IV PiggyBack: 250 mL    Platelets - Single Donor: 220 mL    PRBCs (Packed Red Blood Cells): 350 mL    sodium chloride 0.9%: 1200 mL    Vital1.0: 130 mL  Total IN: 2240 mL    OUT:    Indwelling Catheter - Urethral (mL): 2875 mL    Norepinephrine: 0 mL  Total OUT: 2875 mL    Total NET: -635 mL       @ :  -   @ 11:37  --------------------------------------------------------  IN:    IV PiggyBack: 350 mL    sodium chloride 0.9%: 200 mL    Vital1.0: 10 mL  Total IN: 560 mL    OUT:    Indwelling Catheter - Urethral (mL): 325 mL    Rectal Tube (mL): 150 mL  Total OUT: 475 mL    Total NET: 85 mL      PHYSICAL EXAM:  General: patient seen laying supine in bed in [facility]  Neck: supple. 6 cuffed Shiley trach in place, allevyn foam under trach. sutures in place, soft collar. trach to vent  Cardiac: regular rate  Pulmonary: chest rise symmetric  Abdomen: soft, nontender, nondistended  Ext: perfusing well  Skin: warm, dry    Mode: AC/ CMV (Assist Control/ Continuous Mandatory Ventilation), RR (machine): 14, TV (machine): 400, FiO2: 40, PEEP: 5, ITime: 1, MAP: 6, PIP: 15         LABS                       8.6    2.58  )-----------( 85       ( 05 Aug 2023 05:02 )             24.7        137  |  107  |  12  ----------------------------<  122<H>  3.4<L>   |  23  |  0.44<L>    Ca    7.0<L>      05 Aug 2023 05:02  Phos  2.2       Mg     1.7                Coagulation Studies-   PT/INR - ( 04 Aug 2023 02:39 )   PT: 14.4 sec;   INR: 1.27          PTT - ( 04 Aug 2023 02:39 )  PTT:26.1 sec  Urinalysis Basic - ( 05 Aug 2023 05:02 )    Color: x / Appearance: x / SG: x / pH: x  Gluc: 122 mg/dL / Ketone: x  / Bili: x / Urobili: x   Blood: x / Protein: x / Nitrite: x   Leuk Esterase: x / RBC: x / WBC x   Sq Epi: x / Non Sq Epi: x / Bacteria: x      Endocrine Panel-  Calcium: 7.0 mg/dL ( @ 05:02)                MICROBIOLOGY:  Culture - Sputum (collected 23 @ 17:19)  Source: ET Tube ET Tube  Gram Stain (23 @ 18:50):    No epithelial cells seen    Rare WBC's    No organisms seen  Final Report (23 @ 08:44):    Rare to few Klebsiella pneumoniae    Accompanied by normal respiratory lesa  Organism: Klebsiella pneumoniae (23 @ 08:44)  Organism: Klebsiella pneumoniae (23 @ 08:44)      Method Type: OWEN      -  Ampicillin: R >16 These ampicillin results predict results for amoxicillin      -  Ampicillin/Sulbactam: S <=4/2 Enterobacter, Klebsiella aerogenes, Citrobacter, and Serratia may develop resistance during prolonged therapy (3-4 days)      -  Cefazolin: S <=2 Enterobacter, Klebsiella aerogenes, Citrobacter, and Serratia may develop resistance during prolonged therapy (3-4 days)      -  Ceftriaxone: S <=1 Enterobacter, Klebsiella aerogenes, Citrobacter, and Serratia may develop resistance during prolonged therapy      -  Ciprofloxacin: S <=0.25      -  Ertapenem: S <=0.5      -  Gentamicin: S <=2      -  Piperacillin/Tazobactam: S <=8      -  Tobramycin: S <=2      -  Trimethoprim/Sulfamethoxazole: S <=0.5/9.5      Culture Results:   No growth at 1 day. (23 @ 14:21)  Culture Results:   No growth at 1 day. (23 @ 14:21)  Culture Results:   Rare to few Klebsiella pneumoniae  Accompanied by normal respiratory lesa (23 @ 17:19)  Culture Results:   No growth at 5 days. (23 @ 09:02)  Culture Results:   No growth at 5 days. (23 @ 12:52)  Culture Results:   Growth in anaerobic bottle: Klebsiella pneumoniae (23 @ 17:01)  Culture Results:   No growth at 5 days. (23 @ 17:01)      Urinalysis with Rflx Culture (collected 23 @ 14:21)    Culture - Blood (collected 23 @ 14:21)  Source: .Blood Blood-Peripheral  Preliminary Report (23 @ 15:00):    No growth at 1 day.    Culture - Blood (collected 23 @ 14:21)  Source: .Blood Blood-Peripheral  Preliminary Report (23 @ 15:00):    No growth at 1 day.    Culture - Sputum (collected 23 @ 17:19)  Source: ET Tube ET Tube  Gram Stain (23 @ 18:50):    No epithelial cells seen    Rare WBC's    No organisms seen  Final Report (23 @ 08:44):    Rare to few Klebsiella pneumoniae    Accompanied by normal respiratory lesa  Organism: Klebsiella pneumoniae (23 @ 08:44)  Organism: Klebsiella pneumoniae (23 @ 08:44)      Method Type: OWEN      -  Ampicillin: R >16 These ampicillin results predict results for amoxicillin      -  Ampicillin/Sulbactam: S <=4/2 Enterobacter, Klebsiella aerogenes, Citrobacter, and Serratia may develop resistance during prolonged therapy (3-4 days)      -  Cefazolin: S <=2 Enterobacter, Klebsiella aerogenes, Citrobacter, and Serratia may develop resistance during prolonged therapy (3-4 days)      -  Ceftriaxone: S <=1 Enterobacter, Klebsiella aerogenes, Citrobacter, and Serratia may develop resistance during prolonged therapy      -  Ciprofloxacin: S <=0.25      -  Ertapenem: S <=0.5      -  Gentamicin: S <=2      -  Piperacillin/Tazobactam: S <=8      -  Tobramycin: S <=2      -  Trimethoprim/Sulfamethoxazole: S <=0.5/9.5    Culture - Blood (collected 23 @ 09:02)  Source: .Blood Blood  Final Report (23 @ 10:01):    No growth at 5 days.    Culture - Blood (collected 23 @ 12:52)  Source: .Blood Blood  Final Report (23 @ 14:00):    No growth at 5 days.    Urinalysis with Rflx Culture (collected 23 @ 20:43)

## 2023-08-05 NOTE — PROGRESS NOTE ADULT - SUBJECTIVE AND OBJECTIVE BOX
=========================  NSICU ATTENDING PROGRESS NOTE  =========================    69 y/o male with past medical history of type 2 diabetes, hyperlipidemia, iron deficiency anemia, tracheal stenosis s/p tracheostomy, glioblastoma s/p resection 1/27/2022, and Avastin treatment 3/2023 presenting with altered mental status and weakness x2 days. Per family, LKN 6pm on 07/05 when he was at baseline. Patient has intermittent expressive aphasia but is getting worse and now is persistent, and has intermittent nausea and vomiting since 07/05. Patient was scheduled to have outpatient brain MRI on 7/25/2023.  Stroke code called in ED. Head CT shows new hyperdensity in frontal parietal - surgical site. MRI ordered to rule out tumor recurrence.   Per daughter at bedside, Entresto and Eliquis were stopped per his PC - cardiologist.  (07 Jul 2023 13:10)    HOSP COURSE:   7/27: Multiple BMs o/n. Na stable. Lexapro increased to 10mg daily for worsening depression. Held bowel regimen 2/2 multiple stools.   7/28: ANA LILIA overnight. Na 133 f/u AM labs, pending [place]. Pt has episode of sustained tachycardia, c/o feeling warm, and dizzy while working with Pt. Given 500cc NS bolus, rectal temp was 99F. Family brought in medical marijuana. Pt had rectal temp 100.9, pancultured. Given another 1L bolus and started on maintenance fluids. Rectal temp 102. Started empirically on vanc/cipro/flagyl. Upgraded to telemetry.  7/29: In AM patient had episode of emesis and was unresponsive to sternal rub. Dr. Huggins, neurointensivist at bedside. Repeat BP 60s/40s, tachycardic to 110s. Rapid response called. Levo gtt started. Upgraded to ICU. Intubated for airway protection. Likely septic shock given GNR in blood cx growing K.pneumoniae, Given additional 1L bolus x2. Abx changed to vanc and meropenem, ID following, rec to d/c vanc, c/w meropenem [id number], FOBT positive, keppra changed to vimpat 50BID, fentanyl 25mg Q4 hrs prn for vent dysynchrony, home lexapro decreased to 5mg, decadron decreased to 2q12, sucralfate increased to 1q6 iso GI bleed, pending repeat CBC, CMP, ABG, DIC labs, xray abdomen to r/o obstruction, propofol gtt started for sedation, weaning off precedex gtt, GI consulted rec protonix 40bid iso possible GI bleed, plan for possible EGD, albumin 1.7, 25% albumin 50ml given, s/p 1u PRBC and PLT repeat cbc hgb 8.0 from 6.6 and PLT 88 from 55. Protonix gtt started per GI recs.   7/30: ANA LILIA overnight. Neuro stable. Hemoglobin 6.5, platelets 47 o/n, transfused 1uPRBC, 1u platelets in AM. Repeat Hb 7.1 and platelets 81. CBC repeated and Hb 7.2 and platelets 74. Arnie test negative. 1 set of surveillance blood cultures sent. NS increased to 120cc/hr. Holding off on CT C/A/P per nephrology recs due to concern about IV contrast. Temp 100.8 F, given tylenol. Additional 1u PRBC given in evening for Hb 6.8.     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  Type 2 diabetes mellitus  Hyperlipidemia  Iron deficiency anemia  Nephrolithiasis  Thrombocytopenia  Hyponatremia  BPH (benign prostatic hyperplasia)  S/P craniotomy  H/O tracheostomy    REVIEW OF SYSTEMS: [x] Unable to Assess due to neurologic exam   [ ] All ROS addressed below are non-contributory, except:      ICU Vital Signs Last 24 Hrs  T(C): 37.1 (04 Aug 2023 05:40), Max: 38 (03 Aug 2023 17:58)  T(F): 98.7 (04 Aug 2023 05:40), Max: 100.4 (03 Aug 2023 17:58)  HR: 93 (04 Aug 2023 07:00) (85 - 101)  BP: 93/55 (04 Aug 2023 07:00) (85/53 - 142/71)  BP(mean): 69 (04 Aug 2023 07:00) (64 - 85)  ABP: 111/48 (04 Aug 2023 07:00) (100/49 - 135/59)  ABP(mean): 67 (04 Aug 2023 07:00) (63 - 85)  RR: 19 (04 Aug 2023 07:00) (14 - 19)  SpO2: 100% (04 Aug 2023 07:00) (99% - 100%)      08-03-23 @ 07:01  -  08-04-23 @ 07:004  --------------------------------------------------------  IN: 2294 mL / OUT: 2520 mL / NET: -226 mL    08-04-23 @ 07:01  -  08-04-23 @ 07:27  --------------------------------------------------------  IN: 50 mL / OUT: 0 mL / NET: 50 mL      Mode: CPAP with PS, RR (machine): 17, FiO2: 40, PEEP: 5, PS: 5, MAP: 6, PIP: 14    PHYSICAL EXAM:  General: Calm, intubated; off sedation  HEENT: ETT, OG tube  Neurological: Grimaces to pain, following commands to open and close eyes, sticks out tongue  Eyes opening to noxious stim, tracks, pupils 3mm reactive & midline b/l, localizes bilateral upper extremities, lowers trace WD   +cough/+gag,   Pulmonary: Diminished at bases  Cardiovascular: S1, S2, Regular Rate and Rhythm   Gastrointestinal: Soft, Nontender, Nondistended   Extremities: No edema       MEDICATIONS:   acetaminophen     Tablet .. 650 milliGRAM(s) Oral every 6 hours PRN  atorvastatin 20 milliGRAM(s) Oral at bedtime  cefTRIAXone   IVPB 1000 milliGRAM(s) IV Intermittent every 24 hours  chlorhexidine 0.12% Liquid 15 milliLiter(s) Oral Mucosa every 12 hours  chlorhexidine 2% Cloths 1 Application(s) Topical <User Schedule>  dexAMETHasone  Injectable 2 milliGRAM(s) IV Push every 12 hours  escitalopram 5 milliGRAM(s) Oral daily  fentaNYL    Injectable 25 MICROGram(s) IV Push every 2 hours PRN  insulin glargine Injectable (LANTUS) 14 Unit(s) SubCutaneous at bedtime  insulin lispro (ADMELOG) corrective regimen sliding scale   SubCutaneous every 6 hours  lacosamide IVPB 50 milliGRAM(s) IV Intermittent every 12 hours  metroNIDAZOLE  IVPB      metroNIDAZOLE  IVPB 500 milliGRAM(s) IV Intermittent every 8 hours  multivitamin 1 Tablet(s) Oral daily  norepinephrine Infusion 0.05 MICROgram(s)/kG/Min (6.81 mL/Hr) IV Continuous <Continuous>  ondansetron Injectable 4 milliGRAM(s) IV Push every 6 hours PRN  pantoprazole  Injectable 40 milliGRAM(s) IV Push every 12 hours  sodium chloride 3 Gram(s) Oral every 6 hours  sodium chloride 0.9%. 1000 milliLiter(s) (50 mL/Hr) IV Continuous <Continuous>  sucralfate suspension 1 Gram(s) Oral every 6 hours      LABS:                       7.9    2.24  )-----------( 55       ( 04 Aug 2023 02:39 )             24.3     08-04    137  |  109<H>  |  14  ----------------------------<  168<H>  3.6   |  22  |  0.44<L>    Ca    7.0<L>      04 Aug 2023 02:39  Phos  2.4     08-04  Mg     1.8     08-04    Culture - Blood (collected 08-03-23 @ 14:21)  Source: .Blood Blood-Peripheral  Preliminary Report (08-04-23 @ 03:00):    No growth at 12 hours    Culture - Blood (collected 08-03-23 @ 14:21)  Source: .Blood Blood-Peripheral  Preliminary Report (08-04-23 @ 03:00):    No growth at 12 hours      Culture - Blood (collected 07-31-23 @ 09:02)  Source: .Blood Blood  Preliminary Report (07-31-23 @ 22:00):    No growth at 12 hours    Culture - Blood (collected 07-30-23 @ 12:52)  Source: .Blood Blood  Preliminary Report (07-31-23 @ 02:00):    No growth at 12 hours                         =========================  NSICU ATTENDING PROGRESS NOTE  =========================    67 y/o male with past medical history of type 2 diabetes, hyperlipidemia, iron deficiency anemia, tracheal stenosis s/p tracheostomy, glioblastoma s/p resection 1/27/2022, and Avastin treatment 3/2023 presenting with altered mental status and weakness x2 days. Per family, LKN 6pm on 07/05 when he was at baseline. Patient has intermittent expressive aphasia but is getting worse and now is persistent, and has intermittent nausea and vomiting since 07/05. Patient was scheduled to have outpatient brain MRI on 7/25/2023.  Stroke code called in ED. Head CT shows new hyperdensity in frontal parietal - surgical site. MRI ordered to rule out tumor recurrence.   Per daughter at bedside, Entresto and Eliquis were stopped per his PC - cardiologist.  (07 Jul 2023 13:10)    HOSP COURSE:   7/27: Multiple BMs o/n. Na stable. Lexapro increased to 10mg daily for worsening depression. Held bowel regimen 2/2 multiple stools.   7/28: ANA LILIA overnight. Na 133 f/u AM labs, pending [place]. Pt has episode of sustained tachycardia, c/o feeling warm, and dizzy while working with Pt. Given 500cc NS bolus, rectal temp was 99F. Family brought in medical marijuana. Pt had rectal temp 100.9, pancultured. Given another 1L bolus and started on maintenance fluids. Rectal temp 102. Started empirically on vanc/cipro/flagyl. Upgraded to telemetry.  7/29: In AM patient had episode of emesis and was unresponsive to sternal rub. Dr. Huggins, neurointensivist at bedside. Repeat BP 60s/40s, tachycardic to 110s. Rapid response called. Levo gtt started. Upgraded to ICU. Intubated for airway protection. Likely septic shock given GNR in blood cx growing K.pneumoniae, Given additional 1L bolus x2. Abx changed to vanc and meropenem, ID following, rec to d/c vanc, c/w meropenem [id number], FOBT positive, Keppra changed to vimpat 50BID, fentanyl 25mg Q4 hrs prn for vent dysynchrony, home lexapro decreased to 5mg, decadron decreased to 2q12, sucralfate increased to 1q6 iso GI bleed, pending repeat CBC, CMP, ABG, DIC labs, xray abdomen to r/o obstruction, propofol gtt started for sedation, weaning off precedex gtt, GI consulted rec protonix 40bid iso possible GI bleed, plan for possible EGD, albumin 1.7, 25% albumin 50ml given, s/p 1u PRBC and PLT repeat cbc hgb 8.0 from 6.6 and PLT 88 from 55. Protonix gtt started per GI recs.   7/30: ANA LILIA overnight. Neuro stable. Hemoglobin 6.5, platelets 47 o/n, transfused 1uPRBC, 1u platelets in AM. Repeat Hb 7.1 and platelets 81. CBC repeated and Hb 7.2 and platelets 74. Arnie test negative. 1 set of surveillance blood cultures sent. NS increased to 120cc/hr. Holding off on CT C/A/P per nephrology recs due to concern about IV contrast. Temp 100.8 F, given tylenol. Additional 1u PRBC given in evening for Hb 6.8.     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  Type 2 diabetes mellitus  Hyperlipidemia  Iron deficiency anemia  Nephrolithiasis  Thrombocytopenia  Hyponatremia  BPH (benign prostatic hyperplasia)  S/P craniotomy  H/O tracheostomy    REVIEW OF SYSTEMS: [x] Unable to Assess due to neurologic exam   [ ] All ROS addressed below are non-contributory, except:      ICU Vital Signs Last 24 Hrs  T(C): 37.1 (04 Aug 2023 05:40), Max: 38 (03 Aug 2023 17:58)  T(F): 98.7 (04 Aug 2023 05:40), Max: 100.4 (03 Aug 2023 17:58)  HR: 93 (04 Aug 2023 07:00) (85 - 101)  BP: 93/55 (04 Aug 2023 07:00) (85/53 - 142/71)  BP(mean): 69 (04 Aug 2023 07:00) (64 - 85)  ABP: 111/48 (04 Aug 2023 07:00) (100/49 - 135/59)  ABP(mean): 67 (04 Aug 2023 07:00) (63 - 85)  RR: 19 (04 Aug 2023 07:00) (14 - 19)  SpO2: 100% (04 Aug 2023 07:00) (99% - 100%)      08-03-23 @ 07:01  -  08-04-23 @ 07:004  --------------------------------------------------------  IN: 2294 mL / OUT: 2520 mL / NET: -226 mL    08-04-23 @ 07:01  -  08-04-23 @ 07:27  --------------------------------------------------------  IN: 50 mL / OUT: 0 mL / NET: 50 mL      Mode: CPAP with PS, RR (machine): 17, FiO2: 40, PEEP: 5, PS: 5, MAP: 6, PIP: 14    PHYSICAL EXAM:  General: Calm, intubated; off sedation  HEENT: ETT, OG tube  Neurological: Grimaces to pain, following commands to open and close eyes, sticks out tongue  Eyes opening to noxious stim, tracks, pupils 3mm reactive & midline b/l, localizes bilateral upper extremities, lowers trace WD   +cough/+gag,   Pulmonary: Diminished at bases  Cardiovascular: S1, S2, Regular Rate and Rhythm   Gastrointestinal: Soft, Nontender, Nondistended   Extremities: No edema       MEDICATIONS:   acetaminophen     Tablet .. 650 milliGRAM(s) Oral every 6 hours PRN  atorvastatin 20 milliGRAM(s) Oral at bedtime  cefTRIAXone   IVPB 1000 milliGRAM(s) IV Intermittent every 24 hours  chlorhexidine 0.12% Liquid 15 milliLiter(s) Oral Mucosa every 12 hours  chlorhexidine 2% Cloths 1 Application(s) Topical <User Schedule>  dexAMETHasone  Injectable 2 milliGRAM(s) IV Push every 12 hours  escitalopram 5 milliGRAM(s) Oral daily  fentaNYL    Injectable 25 MICROGram(s) IV Push every 2 hours PRN  insulin glargine Injectable (LANTUS) 14 Unit(s) SubCutaneous at bedtime  insulin lispro (ADMELOG) corrective regimen sliding scale   SubCutaneous every 6 hours  lacosamide IVPB 50 milliGRAM(s) IV Intermittent every 12 hours  metroNIDAZOLE  IVPB      metroNIDAZOLE  IVPB 500 milliGRAM(s) IV Intermittent every 8 hours  multivitamin 1 Tablet(s) Oral daily  norepinephrine Infusion 0.05 MICROgram(s)/kG/Min (6.81 mL/Hr) IV Continuous <Continuous>  ondansetron Injectable 4 milliGRAM(s) IV Push every 6 hours PRN  pantoprazole  Injectable 40 milliGRAM(s) IV Push every 12 hours  sodium chloride 3 Gram(s) Oral every 6 hours  sodium chloride 0.9%. 1000 milliLiter(s) (50 mL/Hr) IV Continuous <Continuous>  sucralfate suspension 1 Gram(s) Oral every 6 hours      LABS:                       7.9    2.24  )-----------( 55       ( 04 Aug 2023 02:39 )             24.3     08-04    137  |  109<H>  |  14  ----------------------------<  168<H>  3.6   |  22  |  0.44<L>    Ca    7.0<L>      04 Aug 2023 02:39  Phos  2.4     08-04  Mg     1.8     08-04    Culture - Blood (collected 08-03-23 @ 14:21)  Source: .Blood Blood-Peripheral  Preliminary Report (08-04-23 @ 03:00):    No growth at 12 hours    Culture - Blood (collected 08-03-23 @ 14:21)  Source: .Blood Blood-Peripheral  Preliminary Report (08-04-23 @ 03:00):    No growth at 12 hours      Culture - Blood (collected 07-31-23 @ 09:02)  Source: .Blood Blood  Preliminary Report (07-31-23 @ 22:00):    No growth at 12 hours    Culture - Blood (collected 07-30-23 @ 12:52)  Source: .Blood Blood  Preliminary Report (07-31-23 @ 02:00):    No growth at 12 hours                         =========================  NSICU ATTENDING PROGRESS NOTE  =========================    69 y/o male with past medical history of type 2 diabetes, hyperlipidemia, iron deficiency anemia, tracheal stenosis s/p tracheostomy, glioblastoma s/p resection 1/27/2022, and Avastin treatment 3/2023 presenting with altered mental status and weakness x2 days. Per family, LKN 6pm on 07/05 when he was at baseline. Patient has intermittent expressive aphasia but is getting worse and now is persistent, and has intermittent nausea and vomiting since 07/05. Patient was scheduled to have outpatient brain MRI on 7/25/2023.  Stroke code called in ED. Head CT shows new hyperdensity in frontal parietal - surgical site. MRI ordered to rule out tumor recurrence.   Per daughter at bedside, Entresto and Eliquis were stopped per his PC - cardiologist.  (07 Jul 2023 13:10)    HOSP COURSE:   7/27: Multiple BMs o/n. Na stable. Lexapro increased to 10mg daily for worsening depression. Held bowel regimen 2/2 multiple stools.   7/28: ANA LILIA overnight. Na 133 f/u AM labs, pending [place]. Pt has episode of sustained tachycardia, c/o feeling warm, and dizzy while working with Pt. Given 500cc NS bolus, rectal temp was 99F. Family brought in medical marijuana. Pt had rectal temp 100.9, pancultured. Given another 1L bolus and started on maintenance fluids. Rectal temp 102. Started empirically on vanc/cipro/flagyl. Upgraded to telemetry.  7/29: In AM patient had episode of emesis and was unresponsive to sternal rub. Dr. Huggins, neurointensivist at bedside. Repeat BP 60s/40s, tachycardic to 110s. Rapid response called. Levo gtt started. Upgraded to ICU. Intubated for airway protection. Likely septic shock given GNR in blood cx growing K.pneumoniae, Given additional 1L bolus x2. Abx changed to vanc and meropenem, ID following, rec to d/c vanc, c/w meropenem [id number], FOBT positive, Keppra changed to vimpat 50BID, fentanyl 25mg Q4 hrs prn for vent dysynchrony, home lexapro decreased to 5mg, decadron decreased to 2q12, sucralfate increased to 1q6 iso GI bleed, pending repeat CBC, CMP, ABG, DIC labs, xray abdomen to r/o obstruction, propofol gtt started for sedation, weaning off precedex gtt, GI consulted rec protonix 40bid iso possible GI bleed, plan for possible EGD, albumin 1.7, 25% albumin 50ml given, s/p 1u PRBC and PLT repeat cbc hgb 8.0 from 6.6 and PLT 88 from 55. Protonix gtt started per GI recs.   7/30: ANA LILIA overnight. Neuro stable. Hemoglobin 6.5, platelets 47 o/n, transfused 1uPRBC, 1u platelets in AM. Repeat Hb 7.1 and platelets 81. CBC repeated and Hb 7.2 and platelets 74. Arnie test negative. 1 set of surveillance blood cultures sent. NS increased to 120cc/hr. Holding off on CT C/A/P per nephrology recs due to concern about IV contrast. Temp 100.8 F, given tylenol. Additional 1u PRBC given in evening for Hb 6.8.     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  Type 2 diabetes mellitus  Hyperlipidemia  Iron deficiency anemia  Nephrolithiasis  Thrombocytopenia  Hyponatremia  BPH (benign prostatic hyperplasia)  S/P craniotomy  H/O tracheostomy    REVIEW OF SYSTEMS: [x] Unable to Assess due to neurologic exam   [ ] All ROS addressed below are non-contributory, except:      ICU Vital Signs Last 24 Hrs  T(C): 37.1 (04 Aug 2023 05:40), Max: 38 (03 Aug 2023 17:58)  T(F): 98.7 (04 Aug 2023 05:40), Max: 100.4 (03 Aug 2023 17:58)  HR: 93 (04 Aug 2023 07:00) (85 - 101)  BP: 93/55 (04 Aug 2023 07:00) (85/53 - 142/71)  BP(mean): 69 (04 Aug 2023 07:00) (64 - 85)  ABP: 111/48 (04 Aug 2023 07:00) (100/49 - 135/59)  ABP(mean): 67 (04 Aug 2023 07:00) (63 - 85)  RR: 19 (04 Aug 2023 07:00) (14 - 19)  SpO2: 100% (04 Aug 2023 07:00) (99% - 100%)      08-03-23 @ 07:01  -  08-04-23 @ 07:004  --------------------------------------------------------  IN: 2294 mL / OUT: 2520 mL / NET: -226 mL    08-04-23 @ 07:01  -  08-04-23 @ 07:27  --------------------------------------------------------  IN: 50 mL / OUT: 0 mL / NET: 50 mL      Mode: CPAP with PS, RR (machine): 17, FiO2: 40, PEEP: 5, PS: 5, MAP: 6, PIP: 14    PHYSICAL EXAM:  General: Calm, intubated; off sedation  HEENT: ETT, OG tube  Neurological: Grimaces to pain, following commands to open and close eyes, sticks out tongue  Eyes opening to noxious stim, tracks, pupils 3mm reactive & midline b/l, localizes bilateral upper extremities, lowers trace WD   +cough/+gag,   Pulmonary: Diminished at bases  Cardiovascular: S1, S2, Regular Rate and Rhythm   Gastrointestinal: Soft, Nontender, Nondistended   Extremities: No edema   Skin: Petechial rash      MEDICATIONS:   acetaminophen     Tablet .. 650 milliGRAM(s) Oral every 6 hours PRN  atorvastatin 20 milliGRAM(s) Oral at bedtime  cefTRIAXone   IVPB 1000 milliGRAM(s) IV Intermittent every 24 hours  chlorhexidine 0.12% Liquid 15 milliLiter(s) Oral Mucosa every 12 hours  chlorhexidine 2% Cloths 1 Application(s) Topical <User Schedule>  dexAMETHasone  Injectable 2 milliGRAM(s) IV Push every 12 hours  escitalopram 5 milliGRAM(s) Oral daily  fentaNYL    Injectable 25 MICROGram(s) IV Push every 2 hours PRN  insulin glargine Injectable (LANTUS) 14 Unit(s) SubCutaneous at bedtime  insulin lispro (ADMELOG) corrective regimen sliding scale   SubCutaneous every 6 hours  lacosamide IVPB 50 milliGRAM(s) IV Intermittent every 12 hours  metroNIDAZOLE  IVPB      metroNIDAZOLE  IVPB 500 milliGRAM(s) IV Intermittent every 8 hours  multivitamin 1 Tablet(s) Oral daily  norepinephrine Infusion 0.05 MICROgram(s)/kG/Min (6.81 mL/Hr) IV Continuous <Continuous>  ondansetron Injectable 4 milliGRAM(s) IV Push every 6 hours PRN  pantoprazole  Injectable 40 milliGRAM(s) IV Push every 12 hours  sodium chloride 3 Gram(s) Oral every 6 hours  sodium chloride 0.9%. 1000 milliLiter(s) (50 mL/Hr) IV Continuous <Continuous>  sucralfate suspension 1 Gram(s) Oral every 6 hours      LABS:                       7.9    2.24  )-----------( 55       ( 04 Aug 2023 02:39 )             24.3     08-04    137  |  109<H>  |  14  ----------------------------<  168<H>  3.6   |  22  |  0.44<L>    Ca    7.0<L>      04 Aug 2023 02:39  Phos  2.4     08-04  Mg     1.8     08-04    Culture - Blood (collected 08-03-23 @ 14:21)  Source: .Blood Blood-Peripheral  Preliminary Report (08-04-23 @ 03:00):    No growth at 12 hours    Culture - Blood (collected 08-03-23 @ 14:21)  Source: .Blood Blood-Peripheral  Preliminary Report (08-04-23 @ 03:00):    No growth at 12 hours                           =========================  NSICU ATTENDING PROGRESS NOTE  =========================    67 y/o male with past medical history of type 2 diabetes, hyperlipidemia, iron deficiency anemia, tracheal stenosis s/p tracheostomy, glioblastoma s/p resection 1/27/2022, and Avastin treatment 3/2023 presenting with altered mental status and weakness x2 days. Per family, LKN 6pm on 07/05 when he was at baseline. Patient has intermittent expressive aphasia but is getting worse and now is persistent, and has intermittent nausea and vomiting since 07/05. Patient was scheduled to have outpatient brain MRI on 7/25/2023.  Stroke code called in ED. Head CT shows new hyperdensity in frontal parietal - surgical site. MRI ordered to rule out tumor recurrence.   Per daughter at bedside, Entresto and Eliquis were stopped per his PC - cardiologist.  (07 Jul 2023 13:10)    HOSP COURSE:   7/27: Multiple BMs o/n. Na stable. Lexapro increased to 10mg daily for worsening depression. Held bowel regimen 2/2 multiple stools.   7/28: ANA LILIA overnight. Na 133 f/u AM labs, pending [place]. Pt has episode of sustained tachycardia, c/o feeling warm, and dizzy while working with Pt. Given 500cc NS bolus, rectal temp was 99F. Family brought in medical marijuana. Pt had rectal temp 100.9, pancultured. Given another 1L bolus and started on maintenance fluids. Rectal temp 102. Started empirically on vanc/cipro/flagyl. Upgraded to telemetry.  7/29: In AM patient had episode of emesis and was unresponsive to sternal rub. Dr. Huggins, neurointensivist at bedside. Repeat BP 60s/40s, tachycardic to 110s. Rapid response called. Levo gtt started. Upgraded to ICU. Intubated for airway protection. Likely septic shock given GNR in blood cx growing K.pneumoniae, Given additional 1L bolus x2. Abx changed to vanc and meropenem, ID following, rec to d/c vanc, c/w meropenem [id number], FOBT positive, Keppra changed to vimpat 50BID, fentanyl 25mg Q4 hrs prn for vent dysynchrony, home lexapro decreased to 5mg, decadron decreased to 2q12, sucralfate increased to 1q6 iso GI bleed, pending repeat CBC, CMP, ABG, DIC labs, xray abdomen to r/o obstruction, propofol gtt started for sedation, weaning off precedex gtt, GI consulted rec protonix 40bid iso possible GI bleed, plan for possible EGD, albumin 1.7, 25% albumin 50ml given, s/p 1u PRBC and PLT repeat cbc hgb 8.0 from 6.6 and PLT 88 from 55. Protonix gtt started per GI recs.   7/30: ANA LILIA overnight. Neuro stable. Hemoglobin 6.5, platelets 47 o/n, transfused 1uPRBC, 1u platelets in AM. Repeat Hb 7.1 and platelets 81. CBC repeated and Hb 7.2 and platelets 74. Arnie test negative. 1 set of surveillance blood cultures sent. NS increased to 120cc/hr. Holding off on CT C/A/P per nephrology recs due to concern about IV contrast. Temp 100.8 F, given tylenol. Additional 1u PRBC given in evening for Hb 6.8.     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  Type 2 diabetes mellitus  Hyperlipidemia  Iron deficiency anemia  Nephrolithiasis  Thrombocytopenia  Hyponatremia  BPH (benign prostatic hyperplasia)  S/P craniotomy  H/O tracheostomy    REVIEW OF SYSTEMS: [x] Unable to Assess due to neurologic exam   [ ] All ROS addressed below are non-contributory, except:      ICU Vital Signs Last 24 Hrs  T(C): 37.1 (04 Aug 2023 05:40), Max: 38 (03 Aug 2023 17:58)  T(F): 98.7 (04 Aug 2023 05:40), Max: 100.4 (03 Aug 2023 17:58)  HR: 93 (04 Aug 2023 07:00) (85 - 101)  BP: 93/55 (04 Aug 2023 07:00) (85/53 - 142/71)  BP(mean): 69 (04 Aug 2023 07:00) (64 - 85)  ABP: 111/48 (04 Aug 2023 07:00) (100/49 - 135/59)  ABP(mean): 67 (04 Aug 2023 07:00) (63 - 85)  RR: 19 (04 Aug 2023 07:00) (14 - 19)  SpO2: 100% (04 Aug 2023 07:00) (99% - 100%)      08-03-23 @ 07:01  -  08-04-23 @ 07:004  --------------------------------------------------------  IN: 2294 mL / OUT: 2520 mL / NET: -226 mL    08-04-23 @ 07:01  -  08-04-23 @ 07:27  --------------------------------------------------------  IN: 50 mL / OUT: 0 mL / NET: 50 mL      Mode: CPAP with PS, RR (machine): 17, FiO2: 40, PEEP: 5, PS: 5, MAP: 6, PIP: 14    PHYSICAL EXAM:  General: Cachectic in appearance. Temporal wasting  Calm, intubated; off sedation  HEENT: Tracheostomy site C/D/I, NGT  Neurological: Grimaces to pain, intermittently follow commands to open and close eyes, stick out tongue though did not follow on 8/5  Eyes opening to noxious stim, tracks, pupils 3mm reactive & midline, cough/ gag  Localizes bilateral upper extremities, lowers trace WD  Pulmonary: Diminished at bases  Cardiovascular: S1, S2, RRR  Gastrointestinal: Soft, nontender, nondistended   Extremities: Upper extremity edema noted  Skin: Petechial rash diffusely over trunk and groin      MEDICATIONS:   acetaminophen     Tablet .. 650 milliGRAM(s) Oral every 6 hours PRN  atorvastatin 20 milliGRAM(s) Oral at bedtime  cefTRIAXone   IVPB 1000 milliGRAM(s) IV Intermittent every 24 hours  chlorhexidine 0.12% Liquid 15 milliLiter(s) Oral Mucosa every 12 hours  chlorhexidine 2% Cloths 1 Application(s) Topical <User Schedule>  dexAMETHasone  Injectable 2 milliGRAM(s) IV Push every 12 hours  escitalopram 5 milliGRAM(s) Oral daily  fentaNYL    Injectable 25 MICROGram(s) IV Push every 2 hours PRN  insulin glargine Injectable (LANTUS) 14 Unit(s) SubCutaneous at bedtime  insulin lispro (ADMELOG) corrective regimen sliding scale   SubCutaneous every 6 hours  lacosamide IVPB 50 milliGRAM(s) IV Intermittent every 12 hours  metroNIDAZOLE  IVPB      metroNIDAZOLE  IVPB 500 milliGRAM(s) IV Intermittent every 8 hours  multivitamin 1 Tablet(s) Oral daily  norepinephrine Infusion 0.05 MICROgram(s)/kG/Min (6.81 mL/Hr) IV Continuous <Continuous>  ondansetron Injectable 4 milliGRAM(s) IV Push every 6 hours PRN  pantoprazole  Injectable 40 milliGRAM(s) IV Push every 12 hours  sodium chloride 3 Gram(s) Oral every 6 hours  sodium chloride 0.9%. 1000 milliLiter(s) (50 mL/Hr) IV Continuous <Continuous>  sucralfate suspension 1 Gram(s) Oral every 6 hours      LABS:                       7.9    2.24  )-----------( 55       ( 04 Aug 2023 02:39 )             24.3     08-04    137  |  109<H>  |  14  ----------------------------<  168<H>  3.6   |  22  |  0.44<L>    Ca    7.0<L>      04 Aug 2023 02:39  Phos  2.4     08-04  Mg     1.8     08-04    Culture - Blood (collected 08-03-23 @ 14:21)  Source: .Blood Blood-Peripheral  Preliminary Report (08-04-23 @ 03:00):    No growth at 12 hours    Culture - Blood (collected 08-03-23 @ 14:21)  Source: .Blood Blood-Peripheral  Preliminary Report (08-04-23 @ 03:00):    No growth at 12 hours

## 2023-08-05 NOTE — PROGRESS NOTE ADULT - ASSESSMENT
Osiel Norwood is a 67YO male with a past medical history of type 2 diabetes, hyperlipidemia, iron deficiency anemia, tracheal stenosis s/p tracheostomy (which was closed by ENT 7 days ago); glioblastoma s/p resection 1/27/2022, and Avastin treatment 3/2023 presenting with altered mental status and weakness x2 days. Patient was previously managed with CIC but then had traumatic gonzalez catheter insertion on the 1st of august and required placement of a 16F coude catheter by our team. Urology was consulted today due to intermittent reddish colored urine per gonzalez and the passage of small clots in the gonzalez bag. The patient was noted to be draining well however, without distention of the bladder, or vital sign abnormality. Urine in the proximal tubing was clear yellow without clots or sediment.     - maintain Gonzalez catheter in place  - reconsult urology if catheter is not draining well or if there is bright red bleeding per catheter.

## 2023-08-05 NOTE — PROGRESS NOTE ADULT - ASSESSMENT
IMPRESSION: JORDAN CHAKRABORTY  is a 68y Male with hx GBM s/p resection 2022, admitted for AMS and suspected recurrence of GBM, hospital stay complicated by bacteremia and septic shock requiring prolonged intubation. Now s/p trach on 8/4 (6 cuffed Shiley, cuff inflated).    Plan:  - Routine trach care  - POD7 8/11 will remove trach sutures

## 2023-08-05 NOTE — PROGRESS NOTE ADULT - SUBJECTIVE AND OBJECTIVE BOX
UROLOGY PROGRESS NOTE    SUBJECTIVE: Patient seen and examined bedside. Patient denies fevers/chills, HA/dizziness, nausea/vomiting, CP/SOB, and ab/flank pain. Ambulating, urinating and having regular bowel movements.    Patient seen an examined at bedside. Gonzalez draining well with some reddish colored urine in the bag. The urine in the proximal tubing was light yellow covered.     cefTRIAXone   IVPB 1000 milliGRAM(s) IV Intermittent every 24 hours  metroNIDAZOLE  IVPB 500 milliGRAM(s) IV Intermittent every 8 hours      Vital Signs Last 24 Hrs  T(C): 37.4 (05 Aug 2023 05:53), Max: 38.2 (04 Aug 2023 22:00)  T(F): 99.4 (05 Aug 2023 05:53), Max: 100.7 (04 Aug 2023 22:00)  HR: 97 (05 Aug 2023 08:53) (93 - 108)  BP: 97/56 (05 Aug 2023 07:00) (95/51 - 110/63)  BP(mean): 73 (05 Aug 2023 07:00) (67 - 81)  RR: 18 (05 Aug 2023 07:00) (14 - 18)  SpO2: 98% (05 Aug 2023 08:53) (94% - 100%)    Parameters below as of 05 Aug 2023 07:00  Patient On (Oxygen Delivery Method): ventilator    O2 Concentration (%): 40  I&O's Detail    04 Aug 2023 07:01  -  05 Aug 2023 07:00  --------------------------------------------------------  IN:    Enteral Tube Flush: 90 mL    IV PiggyBack: 250 mL    Platelets - Single Donor: 220 mL    PRBCs (Packed Red Blood Cells): 350 mL    sodium chloride 0.9%: 1200 mL    Vital1.0: 130 mL  Total IN: 2240 mL    OUT:    Indwelling Catheter - Urethral (mL): 2875 mL    Norepinephrine: 0 mL  Total OUT: 2875 mL    Total NET: -635 mL      05 Aug 2023 07:01  -  05 Aug 2023 09:32  --------------------------------------------------------  IN:    sodium chloride 0.9%: 50 mL    Vital1.0: 10 mL  Total IN: 60 mL    OUT:    Indwelling Catheter - Urethral (mL): 100 mL  Total OUT: 100 mL    Total NET: -40 mL          PHYSICAL EXAM    General: NAD, resting comfortably in bed  C/V: NSR  Pulm: Nonlabored breathing, no respiratory distress on room air  Abd: soft, ND/NT, no rebound tenderness, no guarding, no flank TTP  : gonzalez draining clear yellow urine.  Extrem: WWP, no edema, SCDs in place        LABS:                        8.6    2.58  )-----------( 85       ( 05 Aug 2023 05:02 )             24.7     08-05    137  |  107  |  12  ----------------------------<  122<H>  3.4<L>   |  23  |  0.44<L>    Ca    7.0<L>      05 Aug 2023 05:02  Phos  2.2     08-05  Mg     1.7     08-05      PT/INR - ( 04 Aug 2023 02:39 )   PT: 14.4 sec;   INR: 1.27          PTT - ( 04 Aug 2023 02:39 )  PTT:26.1 sec  Urinalysis Basic - ( 05 Aug 2023 05:02 )    Color: x / Appearance: x / SG: x / pH: x  Gluc: 122 mg/dL / Ketone: x  / Bili: x / Urobili: x   Blood: x / Protein: x / Nitrite: x   Leuk Esterase: x / RBC: x / WBC x   Sq Epi: x / Non Sq Epi: x / Bacteria: x        CULTURES:      Culture - Blood (collected 08-03-23 @ 14:21)  Source: .Blood Blood-Peripheral  Preliminary Report (08-04-23 @ 15:00):    No growth at 1 day.    Culture - Blood (collected 08-03-23 @ 14:21)  Source: .Blood Blood-Peripheral  Preliminary Report (08-04-23 @ 15:00):    No growth at 1 day.    Culture - Blood (collected 07-31-23 @ 09:02)  Source: .Blood Blood  Preliminary Report (08-04-23 @ 10:00):    No growth at 4 days.    Culture - Blood (collected 07-30-23 @ 12:52)  Source: .Blood Blood  Final Report (08-04-23 @ 14:00):    No growth at 5 days.          RADIOLOGY & ADDITIONAL STUDIES:

## 2023-08-06 NOTE — PROGRESS NOTE ADULT - SUBJECTIVE AND OBJECTIVE BOX
INTERVAL HISTORY: HPI:  Osiel Norwood is a 69YO male with a past medical history of type 2 diabetes, hyperlipidemia, iron deficiency anemia, tracheal stenosis s/p tracheostomy (which was closed by ENT 7 days ago); glioblastoma s/p resection 1/27/2022, and Avastin treatment 3/2023 presenting with altered mental status and weakness x2 days. Per family, they last saw him last night around 6pm when he was at baseline. Patient has intermittent expressive aphasia but is getting worse and now is persistent, and has intermittent nausea and vomiting since last night. Patient was supposed to receive an outpatient brain MRI on 7/25/2023.  Stroke code performed in  is negative for CVA. Head CT shows new hyperdensity in frontal parietal - surgical site. MRI is ordered to rule out tumor recurrence.   Per daughter at bedside, Entresto and Eliquis were stopped per his PC - cardiologist.     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  Type 2 diabetes mellitus  Hyperlipidemia  Iron deficiency anemia  Nephrolithiasis  Thrombocytopenia  Hyponatremia  BPH (benign prostatic hyperplasia)  S/P craniotomy  H/O tracheostomy      REVIEW OF SYSTEMS: [x] Unable to Assess due to neurologic exam   [ ] All ROS addressed below are non-contributory, except:  Neuro: [ ] Headache [ ] Back pain [ ] Numbness [ ] Weakness [ ] Ataxia [ ] Dizziness [ ] Aphasia [ ] Dysarthria [ ] Visual disturbance  Resp: [ ] Shortness of breath/dyspnea, [ ] Orthopnea [ ] Cough  CV: [ ] Chest pain [ ] Palpitation [ ] Lightheadedness [ ] Syncope  Renal: [ ] Thirst [ ] Edema  GI: [ ] Nausea [ ] Emesis [ ] Abdominal pain [ ] Constipation [ ] Diarrhea  Hem: [ ] Hematemesis [ ] bright red blood per rectum  ID: [ ] Fever [ ] Chills [ ] Dysuria  ENT: [ ] Rhinorrhea    PHYSICAL EXAM:    General: No Acute Distress, intubated   Neurological: grimace to pain, not following commands, eyes not open to nox, pupils 3mm reactive & midline b/l, WD all extremities to nox, localize b/l UE to nox, +cough/+gag,   Pulmonary: Clear to Auscultation, No Rales, No Rhonchi, No Wheezes   Cardiovascular: S1, S2, Regular Rate and Rhythm   Gastrointestinal: Soft, Nontender, Nondistended   Extremities: No edema       ICU Vital Signs Last 24 Hrs  T(C): 37.7 (06 Aug 2023 21:46), Max: 37.8 (06 Aug 2023 00:43)  T(F): 99.9 (06 Aug 2023 21:46), Max: 100.1 (06 Aug 2023 00:43)  HR: 100 (06 Aug 2023 21:00) (88 - 103)  BP: 91/58 (06 Aug 2023 21:00) (85/50 - 110/60)  BP(mean): 69 (06 Aug 2023 21:00) (62 - 79)  ABP: 92/47 (06 Aug 2023 21:00) (91/46 - 121/51)  ABP(mean): 64 (06 Aug 2023 21:00) (61 - 76)  RR: 21 (06 Aug 2023 20:16) (14 - 21)  SpO2: 97% (06 Aug 2023 21:00) (97% - 100%)      08-05-23 @ 07:01  -  08-06-23 @ 07:00  --------------------------------------------------------  IN: 2250 mL / OUT: 2260 mL / NET: -10 mL    08-06-23 @ 07:01  -  08-06-23 @ 22:01  --------------------------------------------------------  IN: 1520 mL / OUT: 1925 mL / NET: -405 mL        Mode: AC/ CMV (Assist Control/ Continuous Mandatory Ventilation), RR (machine): 12, TV (machine): 400, FiO2: 40, PEEP: 8, ITime: 1, MAP: 9, PIP: 17    acetaminophen     Tablet .. 650 milliGRAM(s) Oral every 6 hours PRN  acetylcysteine 10%  Inhalation 4 milliLiter(s) Inhalation every 6 hours  albuterol/ipratropium for Nebulization 3 milliLiter(s) Nebulizer every 6 hours  atorvastatin 20 milliGRAM(s) Oral at bedtime  chlorhexidine 0.12% Liquid 15 milliLiter(s) Oral Mucosa every 12 hours  chlorhexidine 2% Cloths 1 Application(s) Topical <User Schedule>  dexAMETHasone  Injectable 2 milliGRAM(s) IV Push every 12 hours  escitalopram 5 milliGRAM(s) Oral daily  insulin glargine Injectable (LANTUS) 20 Unit(s) SubCutaneous at bedtime  insulin lispro (ADMELOG) corrective regimen sliding scale   SubCutaneous every 6 hours  lacosamide IVPB 50 milliGRAM(s) IV Intermittent every 12 hours  lactobacillus acidophilus 1 Tablet(s) Oral daily  midodrine 15 milliGRAM(s) Oral every 8 hours  multivitamin 1 Tablet(s) Oral daily  pantoprazole  Injectable 40 milliGRAM(s) IV Push every 12 hours  petrolatum Ophthalmic Ointment 1 Application(s) Both EYES three times a day  sodium chloride 3 Gram(s) Oral every 6 hours  sodium chloride 0.9%. 1000 milliLiter(s) (50 mL/Hr) IV Continuous <Continuous>  sodium chloride 3%  Inhalation 4 milliLiter(s) Inhalation every 6 hours  sucralfate suspension 1 Gram(s) Oral every 6 hours      LABS:  Na: 132 (08-06 @ 05:26), 134 (08-05 @ 10:51), 137 (08-05 @ 05:02), 137 (08-04 @ 02:39)  K: 3.4 (08-06 @ 05:26), 3.9 (08-05 @ 10:51), 3.4 (08-05 @ 05:02), 3.6 (08-04 @ 02:39)  Cl: 104 (08-06 @ 05:26), 106 (08-05 @ 10:51), 107 (08-05 @ 05:02), 109 (08-04 @ 02:39)  CO2: 21 (08-06 @ 05:26), 22 (08-05 @ 10:51), 23 (08-05 @ 05:02), 22 (08-04 @ 02:39)  BUN: 13 (08-06 @ 05:26), 12 (08-05 @ 10:51), 12 (08-05 @ 05:02), 14 (08-04 @ 02:39)  Cr: 0.39 (08-06 @ 05:26), 0.39 (08-05 @ 10:51), 0.44 (08-05 @ 05:02), 0.44 (08-04 @ 02:39)  Glu: 163(08-06 @ 05:26), 151(08-05 @ 10:51), 122(08-05 @ 05:02), 168(08-04 @ 02:39)    Hgb: 8.4 (08-06 @ 05:26), 8.7 (08-05 @ 10:51), 8.6 (08-05 @ 05:02), 8.4 (08-04 @ 23:21), 7.3 (08-04 @ 14:45), 7.9 (08-04 @ 02:39)  Hct: 25.0 (08-06 @ 05:26), 25.4 (08-05 @ 10:51), 24.7 (08-05 @ 05:02), 25.0 (08-04 @ 23:21), 22.0 (08-04 @ 14:45), 24.3 (08-04 @ 02:39)  WBC: 2.32 (08-06 @ 05:26), 2.40 (08-05 @ 10:51), 2.58 (08-05 @ 05:02), 2.51 (08-04 @ 23:21), 2.28 (08-04 @ 14:45), 2.24 (08-04 @ 02:39)  Plt: 79 (08-06 @ 05:26), 82 (08-05 @ 10:51), 85 (08-05 @ 05:02), 99 (08-04 @ 23:21), 94 (08-04 @ 14:45), 55 (08-04 @ 02:39)    INR: 1.27 08-04-23 @ 02:39  PTT: 26.1 08-04-23 @ 02:39    LIVER FUNCTIONS - ( 05 Aug 2023 10:51 )  Alb: 2.2 g/dL / Pro: 4.4 g/dL / ALK PHOS: 60 U/L / ALT: 21 U/L / AST: 22 U/L / GGT: x           ABG - ( 06 Aug 2023 11:38 )  pH, Arterial: 7.45  pH, Blood: x     /  pCO2: 29    /  pO2: 159   / HCO3: 20    / Base Excess: -2.6  /  SaO2: 99.7

## 2023-08-06 NOTE — PROGRESS NOTE ADULT - ASSESSMENT
ASSESSMENT:   69 y/o M with septic shock secondary to Klebsiella bacteremia   GBM s/p resection, recent chemotherapy  History of Takotsubo cardiomyopathy  Chronic SIADH  Type 2 DM    NEURO:  Neurochecks Q4  Analgesia: Fentanyl prn  Sedation: None  CT head 8/6:  Seizure history: Continue vimpat  Cerebral edema: On dexamethasone 2mg Q12  Activity: Bedrest for now. PT/OT evaluation when appropriate  Palliative care following    PULMONARY: Currently requiring mechanical ventilation for airway protection   S/P trach. CPAP as tolerated 5/5 40%  CXR, ABG  VAP bundle  Pulmonary toilet    CARDIOVASCULAR: Hypotension in setting of likely septic shock and acute blood loss anemia 2/2 suspected GIB; resolved.   MAP goal 65-75. Off pressors x 48 hours.  TTE: EF 60-65%. No WMA, no vegetation  Lactate wnl  POCUS 8/6:     GASTROENTEROLOGY: Esophagitis and non-bleeding ulcer, r/o ileus  GI ppx: PPI bid. Continue sucralfate  CT chest/ abd/ pelvis ? small ulcer at first portion of duodenum  Will need PEG eventually; gen surg  Trickle feeds held re concern for ? ileus  AXR 8/5  Reglan x 24 hours  LBM: Rectal tube in place; monitor output. DC bowel regimen  GI following    RENAL/: Dark urine; ?myoglobin vs hematuria vs bilirubinemia. Chronic SIADH, urine retention. Difficult catheterisation.  Hyponatremia  Monitor BMP daily  Campuzano catheter placed by Urology. Maintain per Urology.   Monitor Is/Os  Na goal 135-145; check serum/urine osm/ urine Na  Urology/nephrology following    ENDOCRINE: Diabetes mellitus  A1c- 5.7  ISS while NPO. Lantus 20u qhs. Titrate prn    HEME/ONC: Pancytopenia likely chemo-induced vs sepsis, bone marrow suppression, R/O hemolytic process  DIC ruled out.   Concern for GIB: Trend H/H and plt. Hb goal >7.0, plt goal >50  Plt antibody pending  DVT ppx: Will hold chemical DVT ppx in setting of low platelets/ bleed  B/L SCDs  Heme following    INFECTIOUS: Klebsiella bacteremia ?source- possible translocation  S/P meropenem-> CTX and flagyl until 8/6-DCed  ID following  Repeat blood cultures 7/31 and daily. NGTD from 7/30 and 7/31  Culture on 8/3; follow up. Thus far NGTD.    MISC:  Palliative care following, appreciate recommendations    SOCIAL/FAMILY:  [] awaiting [x] updated daughter and son-in- law at bedside [] family meeting    CODE STATUS:  [x] Full Code [] DNR [] DNI [] Palliative/Comfort Care    DISPOSITION:  [x] ICU [] Stroke Unit [] Floor [] EMU [] RCU [] PCU    Time spent: 60 critical care minutes     ASSESSMENT:   67 y/o M with septic shock secondary to Klebsiella bacteremia   GBM s/p resection, recent chemotherapy  History of Takotsubo cardiomyopathy  Chronic SIADH  Type 2 DM    NEURO:  Neurochecks Q4  Analgesia: Fentanyl prn  Sedation: None.  CT head 8/6:   Seizure history: Continue vimpat  Cerebral edema: On dexamethasone 2mg Q12  Activity: Bedrest for now. PT/OT evaluation when appropriate  Palliative care following    PULMONARY: Currently requiring mechanical ventilation for airway protection   S/P trach. CPAP as tolerated  CXR, ABG  VAP bundle  Pulmonary toilet  POCUS    CARDIOVASCULAR: Hypotension in setting of likely septic shock and acute blood loss anemia 2/2 suspected GIB; resolved.   MAP goal 65-75. Off pressors x 48 hours.  TTE: EF 60-65%. No WMA, no vegetation  Lactate wnl  POCUS 8/6:     GASTROENTEROLOGY: Esophagitis and non-bleeding ulcer, r/o ileus  GI ppx: PPI bid. Continue sucralfate  Will need PEG eventually; gen surg  AXR 8/5- no significant ileus  S/P Reglan x 24 hours. Resume trickle feeds  LBM: 8/6. No significant diarrhea  GI following    RENAL/: Dark urine; ?myoglobin vs hematuria vs bilirubinemia. Chronic SIADH, urine retention. Difficult catheterisation.  Hyponatremia  Campuzano catheter placed by Urology. Maintain per Urology. Monitor Is/Os  Na goal 135-145; check serum/urine osm/ urine Na. Monitor BMP; 4pm  Urology/nephrology following    ENDOCRINE: Diabetes mellitus  A1c- 5.7  ISS while NPO. Lantus 20u qhs. Titrate prn    HEME/ONC: Pancytopenia likely chemo-induced vs sepsis, bone marrow suppression, R/O hemolytic process  DIC ruled out.   Trend H/H and plt. Hb goal >7.0, plt goal >50  Plt antibody pending  DVT ppx: Will hold chemical DVT ppx in setting of low platelets/ bleed  B/L SCDs  Heme following    INFECTIOUS: Klebsiella bacteremia ?source- possible translocation  S/P meropenem-> CTX and flagyl until 8/5-DCed  ID following  Repeat blood cultures 7/31 and daily. NGTD from 7/30 and 7/31  Culture on 8/3; follow up. Thus far NGTD.    MISC:  Palliative care.  Goal per family is for patient to be flown to [place] so that he can pass there.   Family meeting scheduled    SOCIAL/FAMILY:  [] awaiting [x] updated daughter and son-in- law at bedside [] family meeting    CODE STATUS:  [x] Full Code [] DNR [] DNI [] Palliative/Comfort Care    DISPOSITION:  [x] ICU [] Stroke Unit [] Floor [] EMU [] RCU [] PCU    Time spent: 60 critical care minutes

## 2023-08-06 NOTE — PROGRESS NOTE ADULT - ASSESSMENT
68m hx GBM s/p resection, recent chemo, type 2 DM, admit septic shock 2/2 klebsiella bacteremia & PNA; s/p EGD - severe erosive esophagitis     c/w Protonix BID, Carafate suspension;  -h/h stable  -neuro check q4  -s/p ceftriaxone & flagyl for PNA & bacteremia  -restarted on TF today; trickle monitor for high residuals   -hyponatremia maybe in setting of fluid overload, now s/p lasix obtain BMP tonight   -hypotensive in setting of sepsis, MAP goal >65 will administer 250ml 5% albumin; c/w midodrine   -lantus 20U  -urinary retention; gonzalez placed by urology 8/3  -c/w probiotics for loose stools      68m hx GBM s/p resection, recent chemo, type 2 DM, admit septic shock 2/2 klebsiella bacteremia & PNA; s/p EGD - severe erosive esophagitis     c/w Protonix BID, Carafate suspension;  -h/h stable  -neuro check q4  -s/p ceftriaxone & flagyl for PNA & bacteremia  -restarted on TF today; trickle monitor for high residuals   -hyponatremia maybe in setting of fluid overload, now s/p lasix obtain BMP tonight   -hypotensive in setting of sepsis, MAP goal >65 will administer 250ml 5% albumin; c/w midodrine   -lantus 20U  -urinary retention; gonzalez placed by urology 8/3  -c/w probiotics for loose stools   -CT head obtained today - stable in comparison to previous

## 2023-08-06 NOTE — PROGRESS NOTE ADULT - SUBJECTIVE AND OBJECTIVE BOX
SUBJECTIVE: Unable to complete d/t mental status.     HOSPITAL COURSE:  : Admitted. Na 115 in ED with repeat 118, started 3% at 30cc/hr, and salt tabs 3q6, stability CTH in ED showing Interval development of a 13 mm hyperdense nodule along the   inferior margin of the resection cavity which may represent progression   of disease with hemorrhage, repeat CTH stable, urine studies ordered  : Neuro stable, 12AM BMP Na114 3% increased to 40cc/hr, urine studies, pancx to r/o infection since pt is immunocompromised. Changed pantoprazole to sucralfate for GI ppx d/t thrombocytopenia. LE dopplers negative. DC'd 3%. ENT consulted to assess stoma, recommend follow up upon discharge with ENT, Repeat sodium 122. Restarted 3% at 30.  : ANA LILIA o/n neuro stable. remains on 3%@30cc/hr. Started florinef, increased 3% to 50cc/hr. Increased 3% to 75cc/hr.    7/10: continued current 3% rate o/n. Na 127 --> 125, cont 3% @75cc/hr, f/u repeat Na this evening, likely stepdown tomorrow. Pend dispo home with home PT/OT when able. Heme consulted for thrombocytopenia  : ANA LILIA o/n. Remains on 3% @75cc/hr. Decreased 3% to 50cc/hr. Started 1.2L fluid restriction. Repeat Na corrected 128  : ANA LILIA overnight, remains on 3%, SDU from ICU  : ANA LILIA overnight, neuro stable, on 3%@50, Am sodium 137, decreased 3% to 25cc/hr, repeat Na 138, decreased to 15cc/hr.  : ANA LILIA overnight, neuro stable, remains on 3%@15cc/hr  7/15: ANA LILIA overnight. Neuro exam stable. Pt remains on 3% saline.Sodium 134 this AM remains on 3%@25. Per nephrology recs hypertonic Na d'ceed, Na tabs 2q6, URENA 15g BID, cont florinef/ fluid restriction.Per nephrology Na >130 ok when ready for d/c   : ANA LILIA overnight, hypertonics d/c now on urea powder/1L fluid restriction/florinef and salt tabs per renal, hydrocortisone cream ordered for rash on back, rec for Home PT  : ANA LILIA overnight, following Na, renal following, ENT saw for hoarse voice rec followup with CT surgery for possible dilation, pending Home PT. Patient developed sudden onset severe headache. Hyperventilating with increased work of breathing. Wheezing in all lung fields to auscultation. Neuro intact on exam. Duoneb x1 ordered. Dilaudid 0.25 IV given for pain control. Subsequently patient developed nausea with multiple episodes of vomiting. Hypertensive with SBP in the 190s. Given hydralazine 10mg IVP, Zofran. Stroke code and rapid response called. STAT labs sent. CTH/CTA/CTP completed. Tachycardic to the 180s, consistent with SVT, remained hypertensive. Given 10 of labetalol, SVT broke. Transferred to Telemetry. WBC 23.43, lactate 8.5. Given 1L fluid bolus. Pan culture sent. Started empirically on Vanc/Meropenem.   : Put on eeg. Voiding while retaining urine, SC for 500cc. EEG +spikes, epilepsy consulted. CTA chest and CT A/P pending. Keppra increased 1g BID.   : Cont EEG, trend Na, possible NGT today if not able to tolerate PO. Blood cx growing GS + cocci in clusters, f/u MRSA swab and pan cx, cont meropenem and vancomycin. Vanc trough in am. F/u urine studies.   : Off EEG, neuro exam improved. ID following for concern for sepsis, likely aerobic blood cx bottle staph epi contaminant, cont monitoring off of abx, possible drug reaction as cause. F/u surveillance blood cx. Hyponatremia, repeat Na 129 overnight from 130, cont current regimen and f/u am labs and nephrology recs. AM na 129 continuing fluid restriction, nephro recommending restarting urena, but holding due to possible drug reaction previously.   : neurologically stable, c/w fluid restriction, f/u AM Na, urine osm, urine Na, cortisol. Given 15mg of tolvaptan. Fluid restriction, florinef d/c'd per nephro recs. Held salt tabs for today. 6pm BMP Na 122, 10pm BMP Na 131, urine osm 141  : ANA LILIA ovn, neuro stable. AM Na 133. Restarted salt tabs 3q6 and 1L fluid restriction. Lantus 10u at bedtime added. 9:30pm BMP per nephro Na 138  : ANA LILIA ovn, neuro stable. Na stable, discussed with nephrology can cont tolvaptan 15mg daily PRN for hyponatremia, only needed for hypoNa, can discontinue fluid restriction. Pt evaluated at bedside after nurse noted patient to have some expressive aphasia and perseverating; patient oriented to self, unable to say hospital or year, following all commands and DUMONT 5/5 strength, no drift, no facial droop, perseverating on pt's  when asked other questions. Pt afebrile, vitals stable, cont keppra 1g BID, cont decadron 4mg BID, discussed with Dr. Dorado, defer CTH at this time and monitor clinically. . Lantus increased to 14u at bedtime.  : o/n PSVT 13 beats followed by sinus tachycardia in setting of anxiety and net negative fluid balance.  Resolved to 102 with verbal comfort and further resolved with 500 cc NS. Lantus increased to 18u qhs. Dr. Costa consulted.  : ANA LILIA overnight. Neuro stable.   : ANA LILIA overnight. Neuro exam stable. Dispo pending.  : multiple BMs o/n. Na stable. Lexapro increased to 10mg daily for worsening depression. Held bowel regimen 2/2 multiple stools.   : ANA LILIA overnight, Na 133 f/u AM labs, pending [place]. Pt has episode of sustained tachycardia, c/o feeling warm, and dizzy while working with Pt. Given 500cc NS bolus, rectal temp was 99F. Family brought in medical marijuana. Pt had rectal temp 100.9, pancultured. Given another 1L bolus and started on maintenance fluids. Rectal temp 102. Started empirically on vanc/cipro/flagyl. Upgraded to telemetry.  : In AM patient had episode of emesis and was unresponsive to sternal rub. Dr. Huggins, neurointensivist at bedside. Repeat BP 60s/40s, tachycardic to 110s. Rapid response called. Levo gtt started. Upgraded to ICU. Intubated for airway protection. Likely septic shock given GNR in blood cx growing K.pneumoniae, Given additional 1L bolus x2. Abx changed to vanc and meropenem, ID following, rec to d/c vanc, c/w meropenem [id number], FOBT positive, keppra changed to vimpat 50BID, fentanyl 25mg q4hrs prn for vent dysynchrony, home lexapro decreased to 5mg, decadron decreased to 2q12, sucralfate increased to 1q6 iso GI bleed, pending repeat CBC, CMP, ABG, DIC labs, xray abdomen to r/o obstruction, propofol gtt started for sedation, weaning off precedex gtt, GI consulted rec protonix 40bid iso possible GI bleed, plan for possible EGD, albumin 1.7, 25% albumin 50ml given, s/p 1u PRBC and PLT repeat cbc hgb 8.0 from 6.6 and PLT 88 from 55. Protonix gtt started per GI recs.   : ANA LILIA overnight. Neuro stable. Hemoglobin 6.5, platelets 47 o/n, transfused 1uPRBC, 1u platelets in AM. Repeat Hb 7.1 and platelets 81. CBC repeated and Hb 7.2 and platelets 74. Arnie test negative. 1 set of surveillance blood cultures sent. NS increased to 120cc/hr. Holding off on CT C/A/P per nephrology recs due to concern about IV contrast. Temp 100.8 F, given tylenol. Additional 1u PRBC given in evening for Hb 6.8.   : 1u prbc given in am. 6 units lantus started at bedtime. Per ID stopped meropenam and started ceftriaxone 1qd and flagyl 500 q8.  Plan for EGD tomorrow with GI. Blood and urine cx sent per ID recs.   : Tachycardic, gave 20 IV lasix. Hgb at the time stable. NS@20 for renal protection. lantus increased to 14u. Lactate normalized. Given 500cc bolus for soft pressures, restarted levo gtt temporarily, now off. Endoscopy done with GI showed severe circumferential esophagitis, small ulcer to anterior stomach. propofol changed to precedex.   : ANA LILIA overnight, remains sedated on precedex and intubated on ACVC.  Pt tolerating CPAP. Campuzano removed, failed TOV, and straight catheterized. Trickle feeds started 10cc/hr. Changed protonix gtt transitioned to 40mg IV BID per GI. ENT consulted for trach planning.  8/3: Remains on levo. Campuzano replaced by urology due to difficulty straight catheterizing. TF held for high residuals. 1u PRBC transfused. 250cc albumin and 500cc NS bolus. Plan for trach with ENT .  : ANA LILIA overnight. Neuro exam stable. Off levo since 12am. POD 0 trach with ENT. Increased Lantus to 20u 2/2 high FS. +RT 2/2 multiple loose stools. Nephrology consulted / urine appearance, NTD from their standpoint. S/p 2u platelets and 1u PRBCs.  : High residuals of TF, reglan 10IV q8h started. Neuro exam stable. Duonebs, 3% inhalation, mucomyst standing for secretions. Ordered repeat hemolytic studies. DC'd cental line. Holding TF for high residuals. ABD XR w/ stool burden. SBP in 100s, MAP in the low 60s. Given 250cc of albumin, started midodrine 15q8. Given 500c bolus. RT d/c'd.  : ANA LILIA o/n. Metamucil added for diarhea. Tube feeds remain paused for high residuals.     Vital Signs Last 24 Hrs  T(C): 37.8 (06 Aug 2023 00:43), Max: 38.1 (05 Aug 2023 14:49)  T(F): 100.1 (06 Aug 2023 00:43), Max: 100.6 (05 Aug 2023 14:49)  HR: 95 (06 Aug 2023 00:08) (85 - 98)  BP: 95/54 (05 Aug 2023 21:00) (87/52 - 110/62)  BP(mean): 69 (05 Aug 2023 21:00) (65 - 81)  RR: 20 (06 Aug 2023 00:08) (1 - 20)  SpO2: 100% (06 Aug 2023 00:08) (94% - 100%)    Parameters below as of 06 Aug 2023 00:08  Patient On (Oxygen Delivery Method): ventilator, CPAP    O2 Concentration (%): 40    I&O's Summary    04 Aug 2023 07:01  -  05 Aug 2023 07:00  --------------------------------------------------------  IN: 2240 mL / OUT: 2875 mL / NET: -635 mL    05 Aug 2023 07:01  -  06 Aug 2023 01:13  --------------------------------------------------------  IN: 1730 mL / OUT: 1705 mL / NET: 25 mL    PHYSICAL EXAM:  General: patient seen laying supine in bed in NAD  Neuro: OE to noxious, RUE/LUE localizes, b/l LE TF, +cough/gag  Neck: supple  Cardiac: RRR, S1S2  Pulmonary: chest rise symmetric  Abdomen: soft, nontender, nondistended  Ext: perfusing well, +petechiae abdomen, b/l extremities and scrotum   Skin: warm, dry    LABS:                        8.7    2.40  )-----------( 82       ( 05 Aug 2023 10:51 )             25.4     08-05    134<L>  |  106  |  12  ----------------------------<  151<H>  3.9   |  22  |  0.39<L>    Ca    7.1<L>      05 Aug 2023 10:51  Phos  2.2     08-05  Mg     1.7     08-05    TPro  4.4<L>  /  Alb  2.2<L>  /  TBili  0.9  /  DBili  x   /  AST  22  /  ALT  21  /  AlkPhos  60  08-05    PT/INR - ( 04 Aug 2023 02:39 )   PT: 14.4 sec;   INR: 1.27       PTT - ( 04 Aug 2023 02:39 )  PTT:26.1 sec  Urinalysis Basic - ( 05 Aug 2023 10:51 )    Color: x / Appearance: x / SG: x / pH: x  Gluc: 151 mg/dL / Ketone: x  / Bili: x / Urobili: x   Blood: x / Protein: x / Nitrite: x   Leuk Esterase: x / RBC: x / WBC x   Sq Epi: x / Non Sq Epi: x / Bacteria: x    CARDIAC MARKERS ( 05 Aug 2023 10:51 )  x     / x     / 80 U/L / x     / x        CAPILLARY BLOOD GLUCOSE    POCT Blood Glucose.: 158 mg/dL (05 Aug 2023 23:19)  POCT Blood Glucose.: 140 mg/dL (05 Aug 2023 17:38)  POCT Blood Glucose.: 142 mg/dL (05 Aug 2023 11:46)  POCT Blood Glucose.: 112 mg/dL (05 Aug 2023 07:21)    Drug Levels: [] N/A    CSF Analysis: [] N/A    Allergies    amoxicillin (Rash)  ure-Na (Rash; Fever; Hypotension)    Intolerances    MEDICATIONS:  Antibiotics:    Neuro:  acetaminophen     Tablet .. 650 milliGRAM(s) Oral every 6 hours PRN  escitalopram 5 milliGRAM(s) Oral daily  fentaNYL    Injectable 25 MICROGram(s) IV Push every 2 hours PRN  lacosamide IVPB 50 milliGRAM(s) IV Intermittent every 12 hours    Anticoagulation:    OTHER:  acetylcysteine 10%  Inhalation 4 milliLiter(s) Inhalation every 6 hours  albuterol/ipratropium for Nebulization 3 milliLiter(s) Nebulizer every 6 hours  atorvastatin 20 milliGRAM(s) Oral at bedtime  chlorhexidine 0.12% Liquid 15 milliLiter(s) Oral Mucosa every 12 hours  chlorhexidine 2% Cloths 1 Application(s) Topical <User Schedule>  dexAMETHasone  Injectable 2 milliGRAM(s) IV Push every 12 hours  insulin glargine Injectable (LANTUS) 20 Unit(s) SubCutaneous at bedtime  insulin lispro (ADMELOG) corrective regimen sliding scale   SubCutaneous every 6 hours  lactobacillus acidophilus 1 Tablet(s) Oral daily  pantoprazole  Injectable 40 milliGRAM(s) IV Push every 12 hours  petrolatum Ophthalmic Ointment 1 Application(s) Both EYES three times a day  psyllium Powder 1 Packet(s) Oral two times a day  sodium chloride 3%  Inhalation 4 milliLiter(s) Inhalation every 6 hours  sucralfate suspension 1 Gram(s) Oral every 6 hours    IVF:  multivitamin 1 Tablet(s) Oral daily  sodium chloride 3 Gram(s) Oral every 6 hours  sodium chloride 0.9%. 1000 milliLiter(s) IV Continuous <Continuous>    CULTURES:  Culture Results:   No growth at 2 days. ( @ 14:21)  Culture Results:   No growth at 2 days. ( @ 14:21)    RADIOLOGY & ADDITIONAL TESTS:    ASSESSMENT:  69 y/o male PMH T2DM, HLD, JAGDISH, tracheal stenosis s/p tracheostomy (s/p closure); glioblastoma s/p resection 2022, and IA Avastin treatment 3/2023 adm with aphasia, N/V and severe hypoNa (118), imaging showing disesase progression, Hospital course complicated by sepsis secondary to bacteremia, decompensated, intubated, started on antibiotics and upgrated to ICU on . S/p trach with ENT (23).     Plan:  Neuro:  - neuro q4h/vitals q1hrs   - CTH : Interval development of a 13 mm hyperdense nodule along the inferior margin of the resection cavity which may represent progression of disease with hemorrhage. Repeat CTH stable, CTH  stable   - CTA : negative, CTA  stable  - CTP  stable  - pain control prn, fentanyl IVP prn  - vimpat 50mg BID (switched from keppra  thrombocytopenia) for seizure tx  - Decadron 2mg BID  - c/w home Lexapro 5mg (home dose 5mg)  - home ASA 81 held i/s/o thrombocytopenia   - MRI brain  suspicious for recurrent tumor and hydro     Pulm:  - Trach to full vent support (6 Shiley)/CPAP trials as tolerated  - duonebs/mucomyst/3% inhalation q6    Cardio:  - MAP goal >65, off levo  -  outpatient echo EF 65%  - echo : EF 60-65%  -  QTc 465  - midodrine 15q8 started      GI:  - TF Vital 1.0 @ 10cc/hr per NGT + Banatrol, NPO for high residuals  - +RT  2/2 multiple loose stools, d/c'd   - protonix BID and carafate 1g q6h for upper GI bleed  - GI following, s/p EGD : severe circumferential esophagitis, small ulcer to anterior stomach  - reglan 10 IV q8h x1d for high residuals    Renal/:  - Hyponatremic (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 as well as SSRI use), s/p tolvaptan ; currently on NaCl tabs 3g q6h  - NS @ 50cc/hr while NPO  - Campuzano replaced 8/3 by urology for difficult straight cath    Endo:  - A1c 5.7  - lantus 20u qhs, ISS  - h/o T2DM: home Januvia held  - h/o HLD: c/w Atorvastatin 20mg    Heme:  - SCDs for DVT ppx, SQL and Aspirin 81 held for thrombocytopenia   - heme consulted for thrombocytopenia- w/u sent, likely chronic thrombocytopenia d/t chemotherapy agent in past   - Per heme transfuse 1U plt if bleeding and platelet count <50k, if febrile and platelet count <20k. Hold transfusing platelets until tomorrow before procedure.   - 1u PRBC, 1U PLT , 2u PRBC, 1u PLT , 1u PRBC 8/3, 2u PLT , 1u PRBC   - LE dopplers  negative  - LUE doppler : L superficial cephalic vein thrombophlebitis  - CTA PE protocol for tachycardia: negative     ID:  - Pancultured , bcx growing K. pneumoniae, sputum cx K. pneumoniae, f/u blood cx   - meropenem 1g q8hrs (-), changed to Ceftriaxone/Flagyl per ID recs (-)  - CT CAP : Wall thickening in the colon suggesting an infectious or inflammatory colitis, Suspected small duodenal ulcer with surrounding edema in, Distended gallbladder w/o cholecystitis. Mild ascites. Bibasilar pneumonia L>R.  - Febrile, leukocytosis and tachycardia with elevated lactic acid on   - vancomycin/meropenem empirically (-), d/c per ID and repeat blood cx , NGTD    Dispo:   - NSICU status, full code, AR    D/w Dr. Dorado and Dr. Lerma

## 2023-08-06 NOTE — PROGRESS NOTE ADULT - SUBJECTIVE AND OBJECTIVE BOX
=========================  NSICU ATTENDING PROGRESS NOTE  =========================    69 y/o male with past medical history of type 2 diabetes, hyperlipidemia, iron deficiency anemia, tracheal stenosis s/p tracheostomy, glioblastoma s/p resection 1/27/2022, and Avastin treatment 3/2023 presenting with altered mental status and weakness x2 days. Per family, LKN 6pm on 07/05 when he was at baseline. Patient has intermittent expressive aphasia but is getting worse and now is persistent, and has intermittent nausea and vomiting since 07/05. Patient was scheduled to have outpatient brain MRI on 7/25/2023.  Stroke code called in ED. Head CT shows new hyperdensity in frontal parietal - surgical site. MRI ordered to rule out tumor recurrence.   Per daughter at bedside, Entresto and Eliquis were stopped per his PC - cardiologist.  (07 Jul 2023 13:10)    HOSP COURSE:   7/27: Multiple BMs o/n. Na stable. Lexapro increased to 10mg daily for worsening depression. Held bowel regimen 2/2 multiple stools.   7/28: ANA LILIA overnight. Na 133 f/u AM labs, pending [place]. Pt has episode of sustained tachycardia, c/o feeling warm, and dizzy while working with Pt. Given 500cc NS bolus, rectal temp was 99F. Family brought in medical marijuana. Pt had rectal temp 100.9, pancultured. Given another 1L bolus and started on maintenance fluids. Rectal temp 102. Started empirically on vanc/cipro/flagyl. Upgraded to telemetry.  7/29: In AM patient had episode of emesis and was unresponsive to sternal rub. Dr. Huggins, neurointensivist at bedside. Repeat BP 60s/40s, tachycardic to 110s. Rapid response called. Levo gtt started. Upgraded to ICU. Intubated for airway protection. Likely septic shock given GNR in blood cx growing K.pneumoniae, Given additional 1L bolus x2. Abx changed to vanc and meropenem, ID following, rec to d/c vanc, c/w meropenem [id number], FOBT positive, Keppra changed to vimpat 50BID, fentanyl 25mg Q4 hrs prn for vent dysynchrony, home lexapro decreased to 5mg, decadron decreased to 2q12, sucralfate increased to 1q6 iso GI bleed, pending repeat CBC, CMP, ABG, DIC labs, xray abdomen to r/o obstruction, propofol gtt started for sedation, weaning off precedex gtt, GI consulted rec protonix 40bid iso possible GI bleed, plan for possible EGD, albumin 1.7, 25% albumin 50ml given, s/p 1u PRBC and PLT repeat cbc hgb 8.0 from 6.6 and PLT 88 from 55. Protonix gtt started per GI recs.   7/30: ANA LILIA overnight. Neuro stable. Hemoglobin 6.5, platelets 47 o/n, transfused 1uPRBC, 1u platelets in AM. Repeat Hb 7.1 and platelets 81. CBC repeated and Hb 7.2 and platelets 74. Arnie test negative. 1 set of surveillance blood cultures sent. NS increased to 120cc/hr. Holding off on CT C/A/P per nephrology recs due to concern about IV contrast. Temp 100.8 F, given tylenol. Additional 1u PRBC given in evening for Hb 6.8.     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  Type 2 diabetes mellitus  Hyperlipidemia  Iron deficiency anemia  Nephrolithiasis  Thrombocytopenia  Hyponatremia  BPH (benign prostatic hyperplasia)  S/P craniotomy  H/O tracheostomy    REVIEW OF SYSTEMS: [x] Unable to Assess due to neurologic exam   [ ] All ROS addressed below are non-contributory, except:      ICU Vital Signs Last 24 Hrs  T(C): 36.6 (06 Aug 2023 05:34), Max: 38.1 (05 Aug 2023 14:49)  T(F): 97.9 (06 Aug 2023 05:34), Max: 100.6 (05 Aug 2023 14:49)  HR: 95 (06 Aug 2023 09:00) (85 - 98)  BP: 85/50 (06 Aug 2023 08:00) (85/50 - 104/58)  BP(mean): 62 (06 Aug 2023 08:00) (62 - 76)  ABP: 119/49 (06 Aug 2023 09:00) (95/43 - 127/61)  ABP(mean): 71 (06 Aug 2023 09:00) (62 - 81)  RR: 14 (06 Aug 2023 09:00) (1 - 20)  SpO2: 100% (06 Aug 2023 09:00) (97% - 100%)      08-05-23 @ 07:01  -  08-06-23 @ 07:00  --------------------------------------------------------  IN: 2250 mL / OUT: 2260 mL / NET: -10 mL    08-06-23 @ 07:01  -  08-06-23 @ 09:40  --------------------------------------------------------  IN: 100 mL / OUT: 175 mL / NET: -75 mL      Mode: CPAP with PS, FiO2: 40, PEEP: 8, PS: 14, MAP: 10, PIP: 20      PHYSICAL EXAM:  General: Cachectic in appearance. Temporal wasting  Calm, intubated; off sedation  HEENT: Tracheostomy site C/D/I, NGT  Neurological: Grimaces to pain, intermittently follow commands to open and close eyes, stick out tongue though did not follow on 8/5  Eyes opening to noxious stim, tracks, pupils 3mm reactive & midline, cough/ gag  Localizes bilateral upper extremities, lowers trace WD  Pulmonary: Diminished at bases  Cardiovascular: S1, S2, RRR  Gastrointestinal: Soft, nontender, nondistended   Extremities: Upper extremity edema noted  Skin: Petechial rash diffusely over trunk and groin      MEDICATIONS:   acetaminophen     Tablet .. 650 milliGRAM(s) Oral every 6 hours PRN  acetylcysteine 10%  Inhalation 4 milliLiter(s) Inhalation every 6 hours  albuterol/ipratropium for Nebulization 3 milliLiter(s) Nebulizer every 6 hours  atorvastatin 20 milliGRAM(s) Oral at bedtime  chlorhexidine 0.12% Liquid 15 milliLiter(s) Oral Mucosa every 12 hours  chlorhexidine 2% Cloths 1 Application(s) Topical <User Schedule>  dexAMETHasone  Injectable 2 milliGRAM(s) IV Push every 12 hours  escitalopram 5 milliGRAM(s) Oral daily  insulin glargine Injectable (LANTUS) 20 Unit(s) SubCutaneous at bedtime  insulin lispro (ADMELOG) corrective regimen sliding scale   SubCutaneous every 6 hours  lacosamide IVPB 50 milliGRAM(s) IV Intermittent every 12 hours  lactobacillus acidophilus 1 Tablet(s) Oral daily  midodrine 15 milliGRAM(s) Oral every 8 hours  multivitamin 1 Tablet(s) Oral daily  pantoprazole  Injectable 40 milliGRAM(s) IV Push every 12 hours  petrolatum Ophthalmic Ointment 1 Application(s) Both EYES three times a day  potassium chloride   Powder 40 milliEquivalent(s) Oral every 4 hours  psyllium Powder 1 Packet(s) Oral two times a day  sodium chloride 3 Gram(s) Oral every 6 hours  sodium chloride 0.9%. 1000 milliLiter(s) (50 mL/Hr) IV Continuous <Continuous>  sodium chloride 3%  Inhalation 4 milliLiter(s) Inhalation every 6 hours  sodium phosphate 15 milliMole(s)/250 mL IVPB 15 milliMole(s) IV Intermittent once  sucralfate suspension 1 Gram(s) Oral every 6 hours    LABS:                      8.4    2.32  )-----------( 79       ( 06 Aug 2023 05:26 )             25.0     08-06    132<L>  |  104  |  13  ----------------------------<  163<H>  3.4<L>   |  21<L>  |  0.39<L>    Ca    7.2<L>      06 Aug 2023 05:26  Phos  2.2     08-06  Mg     1.8     08-06    TPro  4.4<L>  /  Alb  2.2<L>  /  TBili  0.9  /  DBili  x   /  AST  22  /  ALT  21  /  AlkPhos  60  08-05    LIVER FUNCTIONS - ( 05 Aug 2023 10:51 )  Alb: 2.2 g/dL / Pro: 4.4 g/dL / ALK PHOS: 60 U/L / ALT: 21 U/L / AST: 22 U/L / GGT: x               Culture - Blood (collected 08-03-23 @ 14:21)  Source: .Blood Blood-Peripheral  Preliminary Report (08-04-23 @ 03:00):    No growth at 12 hours    Culture - Blood (collected 08-03-23 @ 14:21)  Source: .Blood Blood-Peripheral  Preliminary Report (08-04-23 @ 03:00):    No growth at 12 hours                           =========================  NSICU ATTENDING PROGRESS NOTE  =========================    69 y/o male with past medical history of type 2 diabetes, hyperlipidemia, iron deficiency anemia, tracheal stenosis s/p tracheostomy, glioblastoma s/p resection 1/27/2022, and Avastin treatment 3/2023 presenting with altered mental status and weakness x2 days. Per family, LKN 6pm on 07/05 when he was at baseline. Patient has intermittent expressive aphasia but is getting worse and now is persistent, and has intermittent nausea and vomiting since 07/05. Patient was scheduled to have outpatient brain MRI on 7/25/2023.  Stroke code called in ED. Head CT shows new hyperdensity in frontal parietal - surgical site. MRI ordered to rule out tumor recurrence.   Per daughter at bedside, Entresto and Eliquis were stopped per his PC - cardiologist.  (07 Jul 2023 13:10)    HOSP COURSE:   7/27: Multiple BMs o/n. Na stable. Lexapro increased to 10mg daily for worsening depression. Held bowel regimen 2/2 multiple stools.   7/28: ANA LILIA overnight. Na 133 f/u AM labs, pending [place]. Pt has episode of sustained tachycardia, c/o feeling warm, and dizzy while working with Pt. Given 500cc NS bolus, rectal temp was 99F. Family brought in medical marijuana. Pt had rectal temp 100.9, pancultured. Given another 1L bolus and started on maintenance fluids. Rectal temp 102. Started empirically on vanc/cipro/flagyl. Upgraded to telemetry.  7/29: In AM patient had episode of emesis and was unresponsive to sternal rub. Dr. Huggins, neurointensivist at bedside. Repeat BP 60s/40s, tachycardic to 110s. Rapid response called. Levo gtt started. Upgraded to ICU. Intubated for airway protection. Likely septic shock given GNR in blood cx growing K.pneumoniae, Given additional 1L bolus x2. Abx changed to vanc and meropenem, ID following, rec to d/c vanc, c/w meropenem [id number], FOBT positive, Keppra changed to vimpat 50BID, fentanyl 25mg Q4 hrs prn for vent dysynchrony, home lexapro decreased to 5mg, decadron decreased to 2q12, sucralfate increased to 1q6 iso GI bleed, pending repeat CBC, CMP, ABG, DIC labs, xray abdomen to r/o obstruction, propofol gtt started for sedation, weaning off precedex gtt, GI consulted rec protonix 40bid iso possible GI bleed, plan for possible EGD, albumin 1.7, 25% albumin 50ml given, s/p 1u PRBC and PLT repeat cbc hgb 8.0 from 6.6 and PLT 88 from 55. Protonix gtt started per GI recs.   7/30: ANA LILIA overnight. Neuro stable. Hemoglobin 6.5, platelets 47 o/n, transfused 1uPRBC, 1u platelets in AM. Repeat Hb 7.1 and platelets 81. CBC repeated and Hb 7.2 and platelets 74. Arnie test negative. 1 set of surveillance blood cultures sent. NS increased to 120cc/hr. Holding off on CT C/A/P per nephrology recs due to concern about IV contrast. Temp 100.8 F, given tylenol. Additional 1u PRBC given in evening for Hb 6.8.     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  Type 2 diabetes mellitus  Hyperlipidemia  Iron deficiency anemia  Nephrolithiasis  Thrombocytopenia  Hyponatremia  BPH (benign prostatic hyperplasia)  S/P craniotomy  H/O tracheostomy    REVIEW OF SYSTEMS: [x] Unable to Assess due to neurologic exam   [ ] All ROS addressed below are non-contributory, except:      ICU Vital Signs Last 24 Hrs  T(C): 36.6 (06 Aug 2023 05:34), Max: 38.1 (05 Aug 2023 14:49)  T(F): 97.9 (06 Aug 2023 05:34), Max: 100.6 (05 Aug 2023 14:49)  HR: 95 (06 Aug 2023 09:00) (85 - 98)  BP: 85/50 (06 Aug 2023 08:00) (85/50 - 104/58)  BP(mean): 62 (06 Aug 2023 08:00) (62 - 76)  ABP: 119/49 (06 Aug 2023 09:00) (95/43 - 127/61)  ABP(mean): 71 (06 Aug 2023 09:00) (62 - 81)  RR: 14 (06 Aug 2023 09:00) (1 - 20)  SpO2: 100% (06 Aug 2023 09:00) (97% - 100%)      08-05-23 @ 07:01  -  08-06-23 @ 07:00  --------------------------------------------------------  IN: 2250 mL / OUT: 2260 mL / NET: -10 mL    08-06-23 @ 07:01  -  08-06-23 @ 09:40  --------------------------------------------------------  IN: 100 mL / OUT: 175 mL / NET: -75 mL      Mode: CPAP with PS, FiO2: 40, PEEP: 6, PS: 12, MAP: 10, PIP: 20  14/400/40/5      PHYSICAL EXAM:  General: Cachectic in appearance. Temporal wasting  Calm, intubated; off sedation  HEENT: Tracheostomy site C/D/I, NGT  Neurological: Grimaces to pain, intermittently follow commands to open and close eyes, stick out tongue though did not follow on 8/5  Eyes opening to noxious stim, tracks, pupils 3mm reactive & midline, cough/ gag  Localizes bilateral upper extremities, lowers trace WD  Pulmonary: Diminished at bases  Cardiovascular: S1, S2, RRR  Gastrointestinal: Soft, nontender, nondistended   Extremities: Upper extremity edema noted  Skin: Petechial rash diffusely over trunk and groin      MEDICATIONS:   acetaminophen     Tablet .. 650 milliGRAM(s) Oral every 6 hours PRN  acetylcysteine 10%  Inhalation 4 milliLiter(s) Inhalation every 6 hours  albuterol/ipratropium for Nebulization 3 milliLiter(s) Nebulizer every 6 hours  atorvastatin 20 milliGRAM(s) Oral at bedtime  chlorhexidine 0.12% Liquid 15 milliLiter(s) Oral Mucosa every 12 hours  chlorhexidine 2% Cloths 1 Application(s) Topical <User Schedule>  dexAMETHasone  Injectable 2 milliGRAM(s) IV Push every 12 hours  escitalopram 5 milliGRAM(s) Oral daily  insulin glargine Injectable (LANTUS) 20 Unit(s) SubCutaneous at bedtime  insulin lispro (ADMELOG) corrective regimen sliding scale   SubCutaneous every 6 hours  lacosamide IVPB 50 milliGRAM(s) IV Intermittent every 12 hours  lactobacillus acidophilus 1 Tablet(s) Oral daily  midodrine 15 milliGRAM(s) Oral every 8 hours  multivitamin 1 Tablet(s) Oral daily  pantoprazole  Injectable 40 milliGRAM(s) IV Push every 12 hours  petrolatum Ophthalmic Ointment 1 Application(s) Both EYES three times a day  potassium chloride   Powder 40 milliEquivalent(s) Oral every 4 hours  psyllium Powder 1 Packet(s) Oral two times a day  sodium chloride 3 Gram(s) Oral every 6 hours  sodium chloride 0.9%. 1000 milliLiter(s) (50 mL/Hr) IV Continuous <Continuous>  sodium chloride 3%  Inhalation 4 milliLiter(s) Inhalation every 6 hours  sodium phosphate 15 milliMole(s)/250 mL IVPB 15 milliMole(s) IV Intermittent once  sucralfate suspension 1 Gram(s) Oral every 6 hours      LABS:                      8.4    2.32  )-----------( 79       ( 06 Aug 2023 05:26 )             25.0     08-06    132<L>  |  104  |  13  ----------------------------<  163<H>  3.4<L>   |  21<L>  |  0.39<L>    Ca    7.2<L>      06 Aug 2023 05:26  Phos  2.2     08-06  Mg     1.8     08-06    TPro  4.4<L>  /  Alb  2.2<L>  /  TBili  0.9  /  DBili  x   /  AST  22  /  ALT  21  /  AlkPhos  60  08-05    LIVER FUNCTIONS - ( 05 Aug 2023 10:51 )  Alb: 2.2 g/dL / Pro: 4.4 g/dL / ALK PHOS: 60 U/L / ALT: 21 U/L / AST: 22 U/L / GGT: x               Culture - Blood (collected 08-03-23 @ 14:21)  Source: .Blood Blood-Peripheral  Preliminary Report (08-04-23 @ 03:00):    No growth at 12 hours    Culture - Blood (collected 08-03-23 @ 14:21)  Source: .Blood Blood-Peripheral  Preliminary Report (08-04-23 @ 03:00):    No growth at 12 hours

## 2023-08-06 NOTE — CHART NOTE - NSCHARTNOTEFT_GEN_A_CORE
: ANA LILIA o/n. Metamucil added for diarhea. Tube feeds remain paused for high residuals. Metamucil d/c'd. Trickle feeds resumed.  Platelet antibody test negative. CTH stable. POCUS w/ b-lines. Given 10 IV lasix.

## 2023-08-07 NOTE — PROGRESS NOTE ADULT - SUBJECTIVE AND OBJECTIVE BOX
SUBJECTIVE: Unable to complete d/t mental status.     HOSPITAL COURSE:  : Admitted. Na 115 in ED with repeat 118, started 3% at 30cc/hr, and salt tabs 3q6, stability CTH in ED showing Interval development of a 13 mm hyperdense nodule along the   inferior margin of the resection cavity which may represent progression   of disease with hemorrhage, repeat CTH stable, urine studies ordered  : Neuro stable, 12AM BMP Na114 3% increased to 40cc/hr, urine studies, pancx to r/o infection since pt is immunocompromised. Changed pantoprazole to sucralfate for GI ppx d/t thrombocytopenia. LE dopplers negative. DC'd 3%. ENT consulted to assess stoma, recommend follow up upon discharge with ENT, Repeat sodium 122. Restarted 3% at 30.  : ANA LILIA o/n neuro stable. remains on 3%@30cc/hr. Started florinef, increased 3% to 50cc/hr. Increased 3% to 75cc/hr.    7/10: continued current 3% rate o/n. Na 127 --> 125, cont 3% @75cc/hr, f/u repeat Na this evening, likely stepdown tomorrow. Pend dispo home with home PT/OT when able. Heme consulted for thrombocytopenia  : ANA LILIA o/n. Remains on 3% @75cc/hr. Decreased 3% to 50cc/hr. Started 1.2L fluid restriction. Repeat Na corrected 128  : ANA LILIA overnight, remains on 3%, SDU from ICU  : ANA LILIA overnight, neuro stable, on 3%@50, Am sodium 137, decreased 3% to 25cc/hr, repeat Na 138, decreased to 15cc/hr.  : ANA LILIA overnight, neuro stable, remains on 3%@15cc/hr  7/15: ANA LILIA overnight. Neuro exam stable. Pt remains on 3% saline.Sodium 134 this AM remains on 3%@25. Per nephrology recs hypertonic Na d'ceed, Na tabs 2q6, URENA 15g BID, cont florinef/ fluid restriction.Per nephrology Na >130 ok when ready for d/c   : ANA LILIA overnight, hypertonics d/c now on urea powder/1L fluid restriction/florinef and salt tabs per renal, hydrocortisone cream ordered for rash on back, rec for Home PT  : ANA LILIA overnight, following Na, renal following, ENT saw for hoarse voice rec followup with CT surgery for possible dilation, pending Home PT. Patient developed sudden onset severe headache. Hyperventilating with increased work of breathing. Wheezing in all lung fields to auscultation. Neuro intact on exam. Duoneb x1 ordered. Dilaudid 0.25 IV given for pain control. Subsequently patient developed nausea with multiple episodes of vomiting. Hypertensive with SBP in the 190s. Given hydralazine 10mg IVP, Zofran. Stroke code and rapid response called. STAT labs sent. CTH/CTA/CTP completed. Tachycardic to the 180s, consistent with SVT, remained hypertensive. Given 10 of labetalol, SVT broke. Transferred to Telemetry. WBC 23.43, lactate 8.5. Given 1L fluid bolus. Pan culture sent. Started empirically on Vanc/Meropenem.   : Put on eeg. Voiding while retaining urine, SC for 500cc. EEG +spikes, epilepsy consulted. CTA chest and CT A/P pending. Keppra increased 1g BID.   : Cont EEG, trend Na, possible NGT today if not able to tolerate PO. Blood cx growing GS + cocci in clusters, f/u MRSA swab and pan cx, cont meropenem and vancomycin. Vanc trough in am. F/u urine studies.   : Off EEG, neuro exam improved. ID following for concern for sepsis, likely aerobic blood cx bottle staph epi contaminant, cont monitoring off of abx, possible drug reaction as cause. F/u surveillance blood cx. Hyponatremia, repeat Na 129 overnight from 130, cont current regimen and f/u am labs and nephrology recs. AM na 129 continuing fluid restriction, nephro recommending restarting urena, but holding due to possible drug reaction previously.   : neurologically stable, c/w fluid restriction, f/u AM Na, urine osm, urine Na, cortisol. Given 15mg of tolvaptan. Fluid restriction, florinef d/c'd per nephro recs. Held salt tabs for today. 6pm BMP Na 122, 10pm BMP Na 131, urine osm 141  : ANA LILIA ovn, neuro stable. AM Na 133. Restarted salt tabs 3q6 and 1L fluid restriction. Lantus 10u at bedtime added. 9:30pm BMP per nephro Na 138  : ANA LILIA ovn, neuro stable. Na stable, discussed with nephrology can cont tolvaptan 15mg daily PRN for hyponatremia, only needed for hypoNa, can discontinue fluid restriction. Pt evaluated at bedside after nurse noted patient to have some expressive aphasia and perseverating; patient oriented to self, unable to say hospital or year, following all commands and DUMONT 5/5 strength, no drift, no facial droop, perseverating on pt's  when asked other questions. Pt afebrile, vitals stable, cont keppra 1g BID, cont decadron 4mg BID, discussed with Dr. Dorado, defer CTH at this time and monitor clinically. . Lantus increased to 14u at bedtime.  : o/n PSVT 13 beats followed by sinus tachycardia in setting of anxiety and net negative fluid balance.  Resolved to 102 with verbal comfort and further resolved with 500 cc NS. Lantus increased to 18u qhs. Dr. Costa consulted.  : ANA LILIA overnight. Neuro stable.   : ANA LILIA overnight. Neuro exam stable. Dispo pending.  : multiple BMs o/n. Na stable. Lexapro increased to 10mg daily for worsening depression. Held bowel regimen 2/2 multiple stools.   : ANA LILIA overnight, Na 133 f/u AM labs, pending [place]. Pt has episode of sustained tachycardia, c/o feeling warm, and dizzy while working with Pt. Given 500cc NS bolus, rectal temp was 99F. Family brought in medical marijuana. Pt had rectal temp 100.9, pancultured. Given another 1L bolus and started on maintenance fluids. Rectal temp 102. Started empirically on vanc/cipro/flagyl. Upgraded to telemetry.  : In AM patient had episode of emesis and was unresponsive to sternal rub. Dr. Huggins, neurointensivist at bedside. Repeat BP 60s/40s, tachycardic to 110s. Rapid response called. Levo gtt started. Upgraded to ICU. Intubated for airway protection. Likely septic shock given GNR in blood cx growing K.pneumoniae, Given additional 1L bolus x2. Abx changed to vanc and meropenem, ID following, rec to d/c vanc, c/w meropenem [id number], FOBT positive, keppra changed to vimpat 50BID, fentanyl 25mg q4hrs prn for vent dysynchrony, home lexapro decreased to 5mg, decadron decreased to 2q12, sucralfate increased to 1q6 iso GI bleed, pending repeat CBC, CMP, ABG, DIC labs, xray abdomen to r/o obstruction, propofol gtt started for sedation, weaning off precedex gtt, GI consulted rec protonix 40bid iso possible GI bleed, plan for possible EGD, albumin 1.7, 25% albumin 50ml given, s/p 1u PRBC and PLT repeat cbc hgb 8.0 from 6.6 and PLT 88 from 55. Protonix gtt started per GI recs.   : ANA LILIA overnight. Neuro stable. Hemoglobin 6.5, platelets 47 o/n, transfused 1uPRBC, 1u platelets in AM. Repeat Hb 7.1 and platelets 81. CBC repeated and Hb 7.2 and platelets 74. Arnie test negative. 1 set of surveillance blood cultures sent. NS increased to 120cc/hr. Holding off on CT C/A/P per nephrology recs due to concern about IV contrast. Temp 100.8 F, given tylenol. Additional 1u PRBC given in evening for Hb 6.8.   : 1u prbc given in am. 6 units lantus started at bedtime. Per ID stopped meropenam and started ceftriaxone 1qd and flagyl 500 q8.  Plan for EGD tomorrow with GI. Blood and urine cx sent per ID recs.   : Tachycardic, gave 20 IV lasix. Hgb at the time stable. NS@20 for renal protection. lantus increased to 14u. Lactate normalized. Given 500cc bolus for soft pressures, restarted levo gtt temporarily, now off. Endoscopy done with GI showed severe circumferential esophagitis, small ulcer to anterior stomach. propofol changed to precedex.   : ANA LILIA overnight, remains sedated on precedex and intubated on ACVC.  Pt tolerating CPAP. Campuzano removed, failed TOV, and straight catheterized. Trickle feeds started 10cc/hr. Changed protonix gtt transitioned to 40mg IV BID per GI. ENT consulted for trach planning.  8/3: Remains on levo. Campuzano replaced by urology due to difficulty straight catheterizing. TF held for high residuals. 1u PRBC transfused. 250cc albumin and 500cc NS bolus. Plan for trach with ENT .  : ANA LILIA overnight. Neuro exam stable. Off levo since 12am. POD 0 trach with ENT. Increased Lantus to 20u 2/2 high FS. +RT 2/2 multiple loose stools. Nephrology consulted 2/ urine appearance, NTD from their standpoint. S/p 2u platelets and 1u PRBCs.  : High residuals of TF, reglan 10IV q8h started. Neuro exam stable. Duonebs, 3% inhalation, mucomyst standing for secretions. Ordered repeat hemolytic studies. DC'd cental line. Holding TF for high residuals. ABD XR w/ stool burden. SBP in 100s, MAP in the low 60s. Given 250cc of albumin, started midodrine 15q8. Given 500c bolus. RT d/c'd.  : ANA LILIA o/n. Metamucil added for diarhea. Tube feeds remain paused for high residuals. Metamucil d/c'd. Trickle feeds resumed.  Platelet antibody test negative. CTH stable. POCUS w/ b-lines. Given 10 IV lasix. s/p albumin 250 cc for hypotension.   : Precedex started for increased alertness and tachypnea. Levo gtt restarted for MAP goal > 65.     Vital Signs Last 24 Hrs  T(C): 37.5 (07 Aug 2023 00:41), Max: 37.7 (06 Aug 2023 21:46)  T(F): 99.5 (07 Aug 2023 00:41), Max: 99.9 (06 Aug 2023 21:46)  HR: 90 (07 Aug 2023 01:00) (88 - 103)  BP: 128/66 (07 Aug 2023 01:00) (85/50 - 128/66)  BP(mean): 91 (07 Aug 2023 01:00) (62 - 91)  RR: 20 (07 Aug 2023 00:00) (14 - 22)  SpO2: 98% (07 Aug 2023 01:00) (96% - 100%)    Parameters below as of 07 Aug 2023 01:00  Patient On (Oxygen Delivery Method): ventilator    O2 Concentration (%): 40    I&O's Summary    05 Aug 2023 07:01  -  06 Aug 2023 07:00  --------------------------------------------------------  IN: 2250 mL / OUT: 2260 mL / NET: -10 mL    06 Aug 2023 07:01  -  07 Aug 2023 01:25  --------------------------------------------------------  IN: 2144.6 mL / OUT: 2500 mL / NET: -355.4 mL    PHYSICAL EXAM:  General: patient seen laying supine in bed in NAD  Neuro: OE to noxious, PER, RUE/LUE localizes, b/l LE TF, +cough/gag  Neck: supple  Cardiac: RRR, S1S2  Pulmonary: chest rise symmetric  Abdomen: soft, nontender, nondistended  Ext: perfusing well, +petechiae abdomen, b/l extremities and scrotum   Skin: warm, dry    LABS:                        8.4    2.32  )-----------( 79       ( 06 Aug 2023 05:26 )             25.0     08-06    135  |  105  |  11  ----------------------------<  190<H>  4.1   |  22  |  0.44<L>    Ca    7.3<L>      06 Aug 2023 22:07  Phos  2.2     08-06  Mg     1.7     08-06    TPro  4.4<L>  /  Alb  2.2<L>  /  TBili  0.9  /  DBili  x   /  AST  22  /  ALT  21  /  AlkPhos  60  08-05    Urinalysis Basic - ( 06 Aug 2023 22:07 )    Color: x / Appearance: x / SG: x / pH: x  Gluc: 190 mg/dL / Ketone: x  / Bili: x / Urobili: x   Blood: x / Protein: x / Nitrite: x   Leuk Esterase: x / RBC: x / WBC x   Sq Epi: x / Non Sq Epi: x / Bacteria: x    CARDIAC MARKERS ( 05 Aug 2023 10:51 )  x     / x     / 80 U/L / x     / x        CAPILLARY BLOOD GLUCOSE    POCT Blood Glucose.: 174 mg/dL (06 Aug 2023 23:23)  POCT Blood Glucose.: 181 mg/dL (06 Aug 2023 21:24)  POCT Blood Glucose.: 183 mg/dL (06 Aug 2023 18:50)  POCT Blood Glucose.: 191 mg/dL (06 Aug 2023 12:25)  POCT Blood Glucose.: 141 mg/dL (06 Aug 2023 06:26)    Drug Levels: [] N/A    CSF Analysis: [] N/A    Allergies    amoxicillin (Rash)  ure-Na (Rash; Fever; Hypotension)    Intolerances    MEDICATIONS:  Antibiotics:    Neuro:  acetaminophen     Tablet .. 650 milliGRAM(s) Oral every 6 hours PRN  dexMEDEtomidine Infusion 0.2 MICROgram(s)/kG/Hr IV Continuous <Continuous>  escitalopram 5 milliGRAM(s) Oral daily  lacosamide IVPB 50 milliGRAM(s) IV Intermittent every 12 hours    Anticoagulation:    OTHER:  acetylcysteine 10%  Inhalation 4 milliLiter(s) Inhalation every 6 hours  albuterol/ipratropium for Nebulization 3 milliLiter(s) Nebulizer every 6 hours  atorvastatin 20 milliGRAM(s) Oral at bedtime  chlorhexidine 0.12% Liquid 15 milliLiter(s) Oral Mucosa every 12 hours  chlorhexidine 2% Cloths 1 Application(s) Topical <User Schedule>  dexAMETHasone  Injectable 2 milliGRAM(s) IV Push every 12 hours  insulin glargine Injectable (LANTUS) 20 Unit(s) SubCutaneous at bedtime  insulin lispro (ADMELOG) corrective regimen sliding scale   SubCutaneous every 6 hours  lactobacillus acidophilus 1 Tablet(s) Oral daily  midodrine 15 milliGRAM(s) Oral every 8 hours  norepinephrine Infusion 0.05 MICROgram(s)/kG/Min IV Continuous <Continuous>  pantoprazole  Injectable 40 milliGRAM(s) IV Push every 12 hours  petrolatum Ophthalmic Ointment 1 Application(s) Both EYES three times a day  sodium chloride 3%  Inhalation 4 milliLiter(s) Inhalation every 6 hours  sucralfate suspension 1 Gram(s) Oral every 6 hours    IVF:  multivitamin 1 Tablet(s) Oral daily  sodium chloride 3 Gram(s) Oral every 6 hours  sodium chloride 0.9%. 1000 milliLiter(s) IV Continuous <Continuous>  sodium phosphate 15 milliMole(s)/250 mL IVPB 15 milliMole(s) IV Intermittent once    CULTURES:  Culture Results:   No growth at 3 days. ( @ 14:21)  Culture Results:   No growth at 3 days. ( @ 14:21)    RADIOLOGY & ADDITIONAL TESTS:  < from: CT Head No Cont (23 @ 16:30) >  INTERPRETATION:  CLINICAL INDICATION: waxing and waning exam. History of   GBM resection .    TECHNIQUE: CT of the head was performed without the administration of   intravenous contrast.    COMPARISON: Waxing and waning exam. History of GBM status post resection   .    FINDINGS:  Grossly stable to slightly increased size of hyperdense/hemorrhagic   lesion in the left anterior-inferior frontal lobe. Extensive surrounding   parenchymal hypodensity is again seen. Overlying left frontal craniotomy   again seen.    No hydrocephalus. No extra-axial fluid collections.    The visualized intraorbital contents are unremarkable. The imaged   portions of the paranasal sinuses are clear. The mastoid air cells are   clear. The remaining visualized soft tissues and osseous structures   appear normal.    IMPRESSION:  Grossly stable to slightly increased size of hyperdense/hemorrhagic   lesion in the left anterior-inferior frontal lobe. Recommend MRI for more   sensitive evaluation in this patient with given history of glioblastoma.    < end of copied text >    ASSESSMENT:  69 YO male PMH T2DM, HLD, JAGDISH, tracheal stenosis s/p tracheostomy (s/p closure); glioblastoma s/p resection 2022, and IA Avastin treatment 3/2023 adm with aphasia, N/V and severe hypoNa (118), imaging showing disesase progression, Hospital course complicated by sepsis secondary to bacteremia, decompensated, intubated, started on antibiotics and upgrated to ICU on . S/p trach with ENT (23).     Plan:  Neuro:  - neuro q4h/vitals q1hrs   - CTH : Interval development of a 13 mm hyperdense nodule along the inferior margin of the resection cavity which may represent progression of disease with hemorrhage. Repeat CTH stable, CTH  stable   - CTA : negative, CTA  stable  - CTP  stable.   - pain control prn, fentanyl IVP prn  - vimpat 50mg BID (switched from keppra / thrombocytopenia) for seizure tx  - Decadron 2mg BID  - c/w home Lexapro 5mg (home dose 5mg)  - home ASA 81 held i/s/o thrombocytopenia   - MRI brain  suspicious for recurrent tumor and hydro     Pulm:  - Trach (6 Shiley) to full vent support 40/400/14/8, CPAP  trials as tolerated  - duonebs/mucomyst/3% inhalation q6    Cardio:  - MAP goal >65, levo gtt prn, midodrine 15q8   -  outpatient echo EF 65%  - echo : EF 60-65%  -  QTc 465    GI:  - TF Vital 1.0 @ 10cc/hr per NGT + Banatrol  - +RT  2/2 multiple loose stools, d/c'd   - protonix BID and carafate 1g q6h for upper GI bleed  - GI following, s/p EGD : severe circumferential esophagitis, small ulcer to anterior stomach  - reglan 10 IV q8h x1d for high residuals    Renal/:  - Hyponatremic (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 as well as SSRI use), s/p tolvaptan ; currently on NaCl tabs 3g q6h  - NS @ 50cc/hr while NPO  - Campuzano replaced 8/3 by urology for difficult straight cath    Endo:  - A1c 5.7  - lantus 20u qhs, ISS  - h/o T2DM: home Januvia held  - h/o HLD: c/w Atorvastatin 20mg    Heme:  - SCDs for DVT ppx, SQL and Aspirin 81 held for thrombocytopenia   - heme consulted for thrombocytopenia- w/u sent, likely chronic thrombocytopenia d/t chemotherapy agent in past   - Per heme transfuse 1U plt if bleeding and platelet count <50k, if febrile and platelet count <20k. Hold transfusing platelets until tomorrow before procedure.   - 1u PRBC, 1U PLT , 2u PRBC, 1u PLT , 1u PRBC 8/3, 2u PLT , 1u PRBC   - LE dopplers  negative  - LUE doppler : L superficial cephalic vein thrombophlebitis  - CTA PE protocol for tachycardia: negative     ID:  - Pancultured , bcx growing K. pneumoniae, sputum cx K. pneumoniae, f/u blood cx   - meropenem 1g q8hrs (-), changed to Ceftriaxone/Flagyl per ID recs (-)  - CT CAP : Wall thickening in the colon suggesting an infectious or inflammatory colitis, Suspected small duodenal ulcer with surrounding edema in, Distended gallbladder w/o cholecystitis. Mild ascites. Bibasilar pneumonia L>R.  - Febrile, leukocytosis and tachycardia with elevated lactic acid on   - vancomycin/meropenem empirically (-), d/c per ID and repeat blood cx , NGTD    Dispo:   - NSICU status, full code, AR    D/w Dr. Dorado and Dr. Lerma

## 2023-08-07 NOTE — CHART NOTE - NSCHARTNOTEFT_GEN_A_CORE
D/c lexapro. Florinef 0.2mg QD, Na goal 135-145. Advanced TF today, assess for intolerance. Goal to taper levo to off. EEG placed, slowing left hemispheric region no seizures. Neuro exam stable.

## 2023-08-07 NOTE — PROGRESS NOTE ADULT - ASSESSMENT
68y/M with  severe hyponatremia  GBM brain compression cerebral edema   Diabetes Mellitus Hypertension dyslipidemia  iron deficiency anemia  nephrolithiasis  thrombocytopenia  BPH    PLAN:   NEURO: neurochecks q4h, VS q1h, PRN pain meds with acetaminophen, PRN fentanyl  GBM - no neurosurgical plans, palliative care, continue steroids  MRI   encephalopathy: sepsis, metabolic, drug, seizures; d/c escitalopram EEG ammonium levels, check lacosamide  REHAB:  physical therapy evaluation and management    EARLY MOB:   OOB to chair, ambulate    PULM:  CPAP 10/5, pulmo toilette, nebs  CARDIO:  MAP 65-70, taper levo   ENDO:  Blood sugar goals 140-180 mg/dL, continue insulin sliding scale, insulin glargine keep at 20 units HS unless fingersticks drop <150  GI:  cont sucralfate, PPI  DIET: advance TF to goal 40  RENAL: Na goal 135-145, restart fludrocortisone, cont salt tabs, recheck in AM, no IVF, POCUS  HEM/ONC: Hb stable  VTE Prophylaxis: SCDs, no DVT chemoprophylaxis for now as patient is high risk for bleed (thrombocytopenia requiring platelet transfusion)  ID: afebrile, no leukocytosis, PCT, panculture if fever spike  Social: GOC discussion with family, son and daughter, son-in-law    Active issues:  What's keeping patient in the ICU? severe hyponatremia  What is this patient's dispo plan? stepdown once Na >125    ATTENDING ATTESTATION:  I was physically present for the key portions of the evaluation and management (E/M) service provided.  I agree with the above history, physical and plan, which I have reviewed and edited where appropriate.    Patient at high risk for neurological deterioration or death due to:  ICU delirium, aspiration PNA, DVT / PE.  Critical care time:  I have personally provided 60 minutes of critical care time, excluding time spent on separate procedures.      Plan discussed with RN, house staff. 68y/M with  severe hyponatremia  GBM brain compression cerebral edema   Diabetes Mellitus Hypertension dyslipidemia  iron deficiency anemia  nephrolithiasis  thrombocytopenia  BPH    PLAN:   NEURO: neurochecks q4h, VS q1h, PRN pain meds with acetaminophen, PRN fentanyl  GBM - no neurosurgical plans, palliative care, continue steroids  MRI   encephalopathy: sepsis, metabolic, drug, seizures; d/c escitalopram EEG ammonium levels, check lacosamide  REHAB:  physical therapy evaluation and management    EARLY MOB:   OOB to chair, ambulate    PULM:  CPAP 10/5, pulmo toilette, nebs  CARDIO:  MAP 65-70, taper levo   ENDO:  Blood sugar goals 140-180 mg/dL, continue insulin sliding scale, insulin glargine keep at 20 units HS unless fingersticks drop <150  GI:  cont sucralfate, PPI  DIET: advance TF to goal 40  RENAL: Na goal 135-145, restart fludrocortisone, cont salt tabs, recheck in AM, no IVF, POCUS  HEM/ONC: Hb stable  VTE Prophylaxis: SCDs, no DVT chemoprophylaxis for now as patient is high risk for bleed (thrombocytopenia requiring platelet transfusion)  ID: afebrile, no leukocytosis, PCT, panculture if fever spike  Social: GOC discussion with family, son and daughter, son-in-law    Active issues:  What's keeping patient in the ICU? [facility] vent  What is this patient's dispo plan?     ATTENDING ATTESTATION:  I was physically present for the key portions of the evaluation and management (E/M) service provided.  I agree with the above history, physical and plan, which I have reviewed and edited where appropriate.    Patient at high risk for neurological deterioration or death due to:  ICU delirium, aspiration PNA, DVT / PE.  Critical care time:  I have personally provided 60 minutes of critical care time, excluding time spent on separate procedures.      Plan discussed with RN, house staff.

## 2023-08-07 NOTE — PROGRESS NOTE ADULT - SUBJECTIVE AND OBJECTIVE BOX
========================  NSICU ATTENDING PROGRESS NOTE  ========================    69 y/o male with PMH of type 2 diabetes, hyperlipidemia, iron deficiency anemia, tracheal stenosis s/p tracheostomy, glioblastoma s/p resection 1/27/2022, and Avastin treatment 3/2023 presenting with altered mental status and weakness x2 days. Per family, LKN 6pm on 07/05 when he was at baseline. Patient has intermittent expressive aphasia but is getting worse and now is persistent, and has intermittent nausea and vomiting since 07/05. Patient was scheduled to have outpatient brain MRI on 7/25/2023.  Stroke code called in ED. Head CT shows new hyperdensity in frontal parietal - surgical site. MRI ordered to rule out tumor recurrence.   Per daughter at bedside, Entresto and Eliquis were stopped per his PC - cardiologist.  (07 Jul 2023 13:10)      ICU Vital Signs Last 24 Hrs  T(C): 37.9 (07 Aug 2023 22:00), Max: 37.9 (07 Aug 2023 21:00)  T(F): 100.2 (07 Aug 2023 22:00), Max: 100.2 (07 Aug 2023 21:00)  HR: 90 (08 Aug 2023 00:00) (68 - 105)  BP: 106/59 (07 Aug 2023 23:00) (91/52 - 128/66)  BP(mean): 78 (07 Aug 2023 23:00) (66 - 95)  ABP: 123/52 (08 Aug 2023 00:00) (76/40 - 130/56)  ABP(mean): 73 (08 Aug 2023 00:00) (54 - 81)  RR: 19 (08 Aug 2023 00:00) (16 - 24)  SpO2: 100% (08 Aug 2023 00:00) (98% - 100%)      08-06-23 @ 07:01  -  08-07-23 @ 07:00  --------------------------------------------------------  IN: 2758.7 mL / OUT: 3225 mL / NET: -466.3 mL    08-07-23 @ 07:01  -  08-08-23 @ 00:36  --------------------------------------------------------  IN: 1081.2 mL / OUT: 1775 mL / NET: -693.8 mL      Mode: CPAP with PS, RR (machine): , TV (machine): , FiO2: 40, PEEP: 5, PS: 10, ITime: 1, MAP: 7.9, PIP: 15      PHYSICAL EXAM:  General: Cachectic in appearance. Temporal wasting  Calm, intubated; off sedation  HEENT: Tracheostomy site C/D/I, NGT  Neurological: Grimaces to pain, intermittently follow commands to open and close eyes, stick out tongue though did not follow on 8/5  Eyes opening to noxious stim, tracks, pupils 3mm reactive & midline, cough/ gag  Localizes bilateral upper extremities, lowers trace WD  Pulmonary: Diminished at bases  Cardiovascular: S1, S2, RRR  Gastrointestinal: Soft, nontender, nondistended   Extremities: Upper extremity edema noted  Skin: Petechial rash diffusely over trunk and groin      MEDICATIONS:   acetaminophen     Tablet .. 650 milliGRAM(s) Oral every 6 hours PRN  acetylcysteine 10%  Inhalation 4 milliLiter(s) Inhalation every 6 hours  albuterol/ipratropium for Nebulization 3 milliLiter(s) Nebulizer every 6 hours  atorvastatin 20 milliGRAM(s) Oral at bedtime  chlorhexidine 0.12% Liquid 15 milliLiter(s) Oral Mucosa every 12 hours  chlorhexidine 2% Cloths 1 Application(s) Topical <User Schedule>  dexAMETHasone  Injectable 2 milliGRAM(s) IV Push every 12 hours  fentaNYL    Injectable 25 MICROGram(s) IV Push every 2 hours PRN  fludroCORTISONE 0.2 milliGRAM(s) Oral daily  insulin glargine Injectable (LANTUS) 10 Unit(s) SubCutaneous at bedtime  insulin lispro (ADMELOG) corrective regimen sliding scale   SubCutaneous every 6 hours  lacosamide IVPB 50 milliGRAM(s) IV Intermittent every 12 hours  lactobacillus acidophilus 1 Tablet(s) Oral daily  midodrine 15 milliGRAM(s) Oral every 8 hours  multivitamin 1 Tablet(s) Oral daily  norepinephrine Infusion 0.05 MICROgram(s)/kG/Min (6.81 mL/Hr) IV Continuous <Continuous>  pantoprazole  Injectable 40 milliGRAM(s) IV Push every 12 hours  petrolatum Ophthalmic Ointment 1 Application(s) Both EYES three times a day  sodium chloride 3 Gram(s) Oral every 6 hours  sodium chloride 0.9%. 1000 milliLiter(s) (30 mL/Hr) IV Continuous <Continuous>  sodium chloride 3%  Inhalation 4 milliLiter(s) Inhalation every 6 hours  sucralfate suspension 1 Gram(s) Oral every 6 hours    LABS:                      8.5    4.75  )-----------( 86       ( 07 Aug 2023 05:05 )             25.2     08-07    134<L>  |  103  |  10  ----------------------------<  90  3.7   |  22  |  0.41<L>    Ca    7.0<L>      07 Aug 2023 05:05  Phos  3.2     08-07  Mg     2.0     08-07        ABG - ( 07 Aug 2023 04:50 )  pH, Arterial: 7.48  pH, Blood: x     /  pCO2: 32    /  pO2: 161   / HCO3: 24    / Base Excess: 0.9   /  SaO2: 99.3          ASSESSMENT:   69 y/o M with septic shock secondary to Klebsiella bacteremia   GBM s/p resection, recent chemotherapy  History of Takotsubo cardiomyopathy  Chronic SIADH  Type 2 DM    NEURO:  Neurochecks Q4. Continue vimpat.  On VEEG; follow up.   CT head stable.   On dexamethasone 2mg Q12  Palliative care following  S/P trach. CPAP as tolerated  MAP goal 65-75. Wean pressors  TTE: EF 60-65%. No WMA, no vegetation  PI bid. Continue sucralfate  Will need PEG eventually; pending family discussion  S/P Reglan x 24 hours. Resume trickle feeds  LBM: 8/6. No significant diarrhea  GI following  Campuzano catheter placed by Urology. Maintain per Urology. Monitor Is/Os  Na goal 135-145  A1c- 5.7  ISS while NPO. Lantus 10u qhs. Titrate prn  Trend H/H and plt. Hb goal >7.0, plt goal >50  Plt antibody negative  DVT ppx: Will hold chemical DVT ppx in setting of low platelets/ bleed. B/L SCDs  Heme following  ID following  Palliative care.Family meeting scheduled for 8/10    Continue care per daytime intensivist Bello AMIN    Additional critical care time: 45 minutes                                   ========================  NSICU ATTENDING PROGRESS NOTE  ========================    67 y/o male with PMH of type 2 diabetes, hyperlipidemia, iron deficiency anemia, tracheal stenosis s/p tracheostomy, glioblastoma s/p resection 1/27/2022, and Avastin treatment 3/2023 presenting with altered mental status and weakness x2 days. Per family, LKN 6pm on 07/05 when he was at baseline. Patient has intermittent expressive aphasia but is getting worse and now is persistent, and has intermittent nausea and vomiting since 07/05. Patient was scheduled to have outpatient brain MRI on 7/25/2023.  Stroke code called in ED. Head CT shows new hyperdensity in frontal parietal - surgical site. MRI ordered to rule out tumor recurrence.   Per daughter at bedside, Entresto and Eliquis were stopped per his PC - cardiologist.  (07 Jul 2023 13:10)      ICU Vital Signs Last 24 Hrs  T(C): 37.9 (07 Aug 2023 22:00), Max: 37.9 (07 Aug 2023 21:00)  T(F): 100.2 (07 Aug 2023 22:00), Max: 100.2 (07 Aug 2023 21:00)  HR: 90 (08 Aug 2023 00:00) (68 - 105)  BP: 106/59 (07 Aug 2023 23:00) (91/52 - 128/66)  BP(mean): 78 (07 Aug 2023 23:00) (66 - 95)  ABP: 123/52 (08 Aug 2023 00:00) (76/40 - 130/56)  ABP(mean): 73 (08 Aug 2023 00:00) (54 - 81)  RR: 19 (08 Aug 2023 00:00) (16 - 24)  SpO2: 100% (08 Aug 2023 00:00) (98% - 100%)      08-06-23 @ 07:01  -  08-07-23 @ 07:00  --------------------------------------------------------  IN: 2758.7 mL / OUT: 3225 mL / NET: -466.3 mL    08-07-23 @ 07:01  -  08-08-23 @ 00:36  --------------------------------------------------------  IN: 1081.2 mL / OUT: 1775 mL / NET: -693.8 mL      Mode: CPAP with PS, RR (machine): , TV (machine): , FiO2: 40, PEEP: 5, PS: 10, ITime: 1, MAP: 7.9, PIP: 15      PHYSICAL EXAM:  General: Cachectic in appearance. Temporal wasting  Intubated; off sedation  HEENT: Tracheostomy site C/D/I, NGT  Neurological: Grimaces to pain, intermittently follow commands to open and close eyes, stick out tongue though did not follow on 8/5  Eyes opening to noxious stim, tracks, pupils 3mm reactive & midline, cough/ gag  Localizes bilateral upper extremities, lowers trace WD  Pulmonary: Diminished at bases  Cardiovascular: S1, S2, RRR  Gastrointestinal: Soft, nontender, nondistended   Extremities: Upper extremity edema noted  Skin: Petechial rash diffusely over trunk and groin      MEDICATIONS:   acetaminophen     Tablet .. 650 milliGRAM(s) Oral every 6 hours PRN  acetylcysteine 10%  Inhalation 4 milliLiter(s) Inhalation every 6 hours  albuterol/ipratropium for Nebulization 3 milliLiter(s) Nebulizer every 6 hours  atorvastatin 20 milliGRAM(s) Oral at bedtime  chlorhexidine 0.12% Liquid 15 milliLiter(s) Oral Mucosa every 12 hours  chlorhexidine 2% Cloths 1 Application(s) Topical <User Schedule>  dexAMETHasone  Injectable 2 milliGRAM(s) IV Push every 12 hours  fentaNYL    Injectable 25 MICROGram(s) IV Push every 2 hours PRN  fludroCORTISONE 0.2 milliGRAM(s) Oral daily  insulin glargine Injectable (LANTUS) 10 Unit(s) SubCutaneous at bedtime  insulin lispro (ADMELOG) corrective regimen sliding scale   SubCutaneous every 6 hours  lacosamide IVPB 50 milliGRAM(s) IV Intermittent every 12 hours  lactobacillus acidophilus 1 Tablet(s) Oral daily  midodrine 15 milliGRAM(s) Oral every 8 hours  multivitamin 1 Tablet(s) Oral daily  norepinephrine Infusion 0.05 MICROgram(s)/kG/Min (6.81 mL/Hr) IV Continuous <Continuous>  pantoprazole  Injectable 40 milliGRAM(s) IV Push every 12 hours  petrolatum Ophthalmic Ointment 1 Application(s) Both EYES three times a day  sodium chloride 3 Gram(s) Oral every 6 hours  sodium chloride 0.9%. 1000 milliLiter(s) (30 mL/Hr) IV Continuous <Continuous>  sodium chloride 3%  Inhalation 4 milliLiter(s) Inhalation every 6 hours  sucralfate suspension 1 Gram(s) Oral every 6 hours    LABS:                      8.5    4.75  )-----------( 86       ( 07 Aug 2023 05:05 )             25.2     08-07    134<L>  |  103  |  10  ----------------------------<  90  3.7   |  22  |  0.41<L>    Ca    7.0<L>      07 Aug 2023 05:05  Phos  3.2     08-07  Mg     2.0     08-07        ABG - ( 07 Aug 2023 04:50 )  pH, Arterial: 7.48  pH, Blood: x     /  pCO2: 32    /  pO2: 161   / HCO3: 24    / Base Excess: 0.9   /  SaO2: 99.3        ASSESSMENT:   67 y/o M with septic shock secondary to Klebsiella bacteremia   GBM s/p resection, recent chemotherapy  History of Takotsubo cardiomyopathy  Chronic SIADH  Type 2 DM    NEURO:  Neurochecks Q4. Continue vimpat.  On VEEG; follow up.   CT head stable.   On dexamethasone 2mg Q12  Palliative care following  S/P trach. CPAP as tolerated  MAP goal 65-70. Wean pressors. On midodrine  TTE: EF 60-65%. No WMA, no vegetation  PPI bid. Continue sucralfate  Will need PEG eventually; pending family discussion  TFs. Advance to goal  LBM: 8/6. No significant diarrhea  Campuzano catheter placed by Urology. Maintain per Urology. Monitor Is/Os  Na goal 135-145  A1c- 5.7  ISS. Lantus to 10u qhs. Titrate prn  Trend H/H and plt. Hb goal >7.0, plt goal >50  Plt antibody negative  DVT ppx: Will hold chemical DVT ppx in setting of low platelets/ bleed. B/L SCDs  Heme following. ID following  Palliative care. Family meeting scheduled for 8/10    Continue care per daytime intensivist Bello AMIN    Additional critical care time: 45 minutes

## 2023-08-07 NOTE — PROGRESS NOTE ADULT - SUBJECTIVE AND OBJECTIVE BOX
=================================  NEUROCRITICAL CARE ATTENDING NOTE  =================================    JORDAN CHAKRABORTY   MRN-[id number]  Summary:  68y/M  with type 2 diabetes, hyperlipidemia, iron deficiency anemia, tracheal stenosis s/p tracheostomy (which was closed by ENT 7 days ago); glioblastoma s/p resection 1/27/2022, and Avastin treatment 3/2023 presenting with altered mental status and weakness x2 days. Per family, they last saw him last night around 6pm when he was at baseline. Patient has intermittent expressive aphasia but is getting worse and now is persistent, and has intermittent nausea and vomiting since last night. Patient was supposed to receive an outpatient brain MRI on 7/25/2023. Stroke code performed in  is negative for CVA. Head CT shows new hyperdensity in frontal parietal - surgical site. MRI is ordered to rule out tumor recurrence.  Per daughter at bedside, Entresto and Eliquis were stopped per his PC - cardiologist.   (07 Jul 2023 13:10)    COURSE IN THE HOSPITAL:  07/07 Admitted to [facility], started on 3%  07/08 37.8 pancultured No significant events overnight.   07/09 No significant events overnight.  07/10      07/29 back to ICU for septic shock - Klebsiella  08/07 back on pressors overnight, CT overnight, 1/2 amp glucose given    Past Medical History: No pertinent past medical history diabetes mellitus Hypertension  Glioblastoma  Type 2 diabetes mellitus  Hyperlipidemia  Iron deficiency anemia  Nephrolithiasis  Thrombocytopenia  Hyponatremia  BPH (benign prostatic hyperplasia)  Allergies:  amoxicillin (Rash)  Home meds:   ·	aspirin 81 mg oral tablet: 1 tab(s) orally once a day  ·	atorvastatin 20 mg oral tablet: 1 tab(s) orally once a day (at bedtime)  ·	escitalopram 5 mg oral tablet: 1 tab(s) orally once a day  ·	fluticasone 50 mcg/inh nasal spray: 1 spray(s) nasal 2 times a day  ·	Januvia 100 mg oral tablet: 1 tab(s) orally once a day  ·	levETIRAcetam 750 mg oral tablet: 1 tab(s) orally 2 times a day  ·	melatonin 3 mg oral tablet: 2 tab(s) orally once a day (at bedtime)  ·	ondansetron 4 mg oral tablet: 1 tab(s) orally once a day  ·	pantoprazole 40 mg oral delayed release tablet: 1 tab(s) orally once a day (before a meal)  ·	senna (sennosides) 8.6 mg oral tablet: 1 tab(s) orally once a day as needed for  constipation  ·	sodium chloride 1 g oral tablet: 2 tab(s) orally every 6 hours    PHYSICAL EXAMINATION  T(C): 36.9 (08-07 @ 05:45), Max: 37.7 (08-06 @ 21:46) HR: 69 (08-07 @ 08:05) (68 - 103) BP: 102/63 (08-07 @ 08:00) (91/58 - 128/66) RR: 18 (08-07 @ 08:05) (14 - 22) SpO2: 99% (08-07 @ 08:05) (96% - 100%)  NEUROLOGIC EXAMINATION:  Patient is  awake alert orientation x2, JUAN, no facial droop, moving all 4s with good strength  eyes open, not FC, tracks to L, JUAN, L UE trace withdrawal, R UE 0/5, BLE TF  GENERAL: 12 40 400 +5   EENT:  anicteric, trach site clean  CARDIOVASCULAR: (+) S1 S2, normal rate and regular rhythm  PULMONARY: clear to auscultation bilaterally  ABDOMEN: soft, nontender with normoactive bowel sounds  EXTREMITIES: no edema  SKIN: no rash    LABS: 08-07  CAPILLARY BLOOD GLUCOSE 129 84 [phone] 191 - insulin glargine 20 units given 6 units coverage             (2.32)     8.5    4.75  )-----------( 86   (79)    ( 07 Aug 2023 05:05 )             25.2     134<L>  |  103  |  10  ----------------------------<  90  3.7   |  22  |  0.41<L>    Ca    7.0<L>      07 Aug 2023 05:05  Phos  3.2     08-07  Mg     2.0     08-07    TPro  4.4<L>  /  Alb  2.2<L>  /  TBili  0.9  /  DBili  x   /  AST  22  /  ALT  21  /  AlkPhos  60  08-05    ABG - ( 07 Aug 2023 04:50 )  pH, Arterial: 7.48  pH, Blood: x     /  pCO2: 32    /  pO2: 161   / HCO3: 24    / Base Excess: 0.9   /  SaO2: 99.3      08-06 @ 07:01  -  08-07 @ 07:00  IN: 2758.7 mL / OUT: 3225 mL / NET: -466.3 mL    Bacteriology:  08/03 Blood CS NG3D x2   07/31 sputum rare to few Klebsiella    CSF studies:  EEG:  Neuroimaging:  Other imaging:    MEDICATIONS: 08-07    ·	escitalopram 5 Oral daily  ·	lacosamide IVPB 50 IV Intermittent every 12 hours  ·	midodrine 15 milliGRAM(s) Oral every 8 hours  ·	acetylcysteine 10%  Inhalation 4 Inhalation every 6 hours  ·	albuterol/ipratropium for Nebulization 3 Nebulizer every 6 hours  ·	sodium chloride 3%  Inhalation 4 Inhalation every 6 hours  ·	pantoprazole  Injectable 40 IV Push every 12 hours  ·	sucralfate suspension 1 Oral every 6 hours  ·	atorvastatin 20 Oral at bedtime  ·	dexAMETHasone  Injectable 2 IV Push every 12 hours  ·	insulin glargine Injectable (LANTUS) 20 SubCutaneous at bedtime  ·	insulin lispro (ADMELOG) corrective regimen sliding scale  SubCutaneous every 6 hours  ·	lactobacillus acidophilus 1 Oral daily  ·	multivitamin 1 Oral daily  ·	petrolatum Ophthalmic Ointment 1 Both EYES three times a day  ·	sodium chloride 3 Oral every 6 hours  ·	acetaminophen     Tablet .. 650 Oral every 6 hours PRN    IV FLUIDS: IVL  DRIPS:  ·	norepinephrine Infusion 0.05 MICROgram(s)/kG/Min IV Continuous <Continuous> 0.1 --> 0.06  ·	precedex OFF  DIET: TF Vital @ 10cc/hr  Lines:  Drains:    Wounds:    CODE STATUS:  Full Code                       GOALS OF CARE:  aggressive                      DISPOSITION:  ICU =================================  NEUROCRITICAL CARE ATTENDING NOTE  =================================    JORDAN CHAKRABORTY   MRN-[id number]  Summary:  68y/M  with type 2 diabetes, hyperlipidemia, iron deficiency anemia, tracheal stenosis s/p tracheostomy (which was closed by ENT 7 days ago); glioblastoma s/p resection 1/27/2022, and Avastin treatment 3/2023 presenting with altered mental status and weakness x2 days. Per family, they last saw him last night around 6pm when he was at baseline. Patient has intermittent expressive aphasia but is getting worse and now is persistent, and has intermittent nausea and vomiting since last night. Patient was supposed to receive an outpatient brain MRI on 7/25/2023. Stroke code performed in  is negative for CVA. Head CT shows new hyperdensity in frontal parietal - surgical site. MRI is ordered to rule out tumor recurrence.  Per daughter at bedside, Entresto and Eliquis were stopped per his PC - cardiologist.   (07 Jul 2023 13:10)    COURSE IN THE HOSPITAL:  07/07 Admitted to [facility], started on 3%  07/08 37.8 pancultured No significant events overnight.   07/09 No significant events overnight.    07/29 back to ICU for septic shock - Klebsiella  08/07 back on pressors overnight, CT overnight, 1/2 amp glucose given    Past Medical History: No pertinent past medical history diabetes mellitus Hypertension  Glioblastoma  Type 2 diabetes mellitus  Hyperlipidemia  Iron deficiency anemia  Nephrolithiasis  Thrombocytopenia  Hyponatremia  BPH (benign prostatic hyperplasia)  Allergies:  amoxicillin (Rash)  Home meds:   ·	aspirin 81 mg oral tablet: 1 tab(s) orally once a day  ·	atorvastatin 20 mg oral tablet: 1 tab(s) orally once a day (at bedtime)  ·	escitalopram 5 mg oral tablet: 1 tab(s) orally once a day  ·	fluticasone 50 mcg/inh nasal spray: 1 spray(s) nasal 2 times a day  ·	Januvia 100 mg oral tablet: 1 tab(s) orally once a day  ·	levETIRAcetam 750 mg oral tablet: 1 tab(s) orally 2 times a day  ·	melatonin 3 mg oral tablet: 2 tab(s) orally once a day (at bedtime)  ·	ondansetron 4 mg oral tablet: 1 tab(s) orally once a day  ·	pantoprazole 40 mg oral delayed release tablet: 1 tab(s) orally once a day (before a meal)  ·	senna (sennosides) 8.6 mg oral tablet: 1 tab(s) orally once a day as needed for  constipation  ·	sodium chloride 1 g oral tablet: 2 tab(s) orally every 6 hours    PHYSICAL EXAMINATION  T(C): 36.9 (08-07 @ 05:45), Max: 37.7 (08-06 @ 21:46) HR: 69 (08-07 @ 08:05) (68 - 103) BP: 102/63 (08-07 @ 08:00) (91/58 - 128/66) RR: 18 (08-07 @ 08:05) (14 - 22) SpO2: 99% (08-07 @ 08:05) (96% - 100%)  NEUROLOGIC EXAMINATION:  Patient is  awake alert orientation x2, JUAN, no facial droop, moving all 4s with good strength  eyes open, not FC, tracks to L, JUAN, L UE trace withdrawal, R UE 0/5, BLE TF  GENERAL: 12 40 400 +5   EENT:  anicteric, trach site clean  CARDIOVASCULAR: (+) S1 S2, normal rate and regular rhythm  PULMONARY: clear to auscultation bilaterally  ABDOMEN: soft, nontender with normoactive bowel sounds  EXTREMITIES: no edema  SKIN: no rash    LABS: 08-07  CAPILLARY BLOOD GLUCOSE 129 84 [phone] 191 - insulin glargine 20 units given 6 units coverage             (2.32)     8.5    4.75  )-----------( 86   (79)    ( 07 Aug 2023 05:05 )             25.2     134<L>  |  103  |  10  ----------------------------<  90  3.7   |  22  |  0.41<L>    Ca    7.0<L>      07 Aug 2023 05:05  Phos  3.2     08-07  Mg     2.0     08-07    TPro  4.4<L>  /  Alb  2.2<L>  /  TBili  0.9  /  DBili  x   /  AST  22  /  ALT  21  /  AlkPhos  60  08-05    ABG - ( 07 Aug 2023 04:50 )  pH, Arterial: 7.48  pH, Blood: x     /  pCO2: 32    /  pO2: 161   / HCO3: 24    / Base Excess: 0.9   /  SaO2: 99.3      08-06 @ 07:01  -  08-07 @ 07:00  IN: 2758.7 mL / OUT: 3225 mL / NET: -466.3 mL    Bacteriology:  08/03 Blood CS NG3D x2   07/31 sputum rare to few Klebsiella    CSF studies:  EEG:  Neuroimaging:  Other imaging:    MEDICATIONS: 08-07    ·	escitalopram 5 Oral daily  ·	lacosamide IVPB 50 IV Intermittent every 12 hours  ·	midodrine 15 milliGRAM(s) Oral every 8 hours  ·	acetylcysteine 10%  Inhalation 4 Inhalation every 6 hours  ·	albuterol/ipratropium for Nebulization 3 Nebulizer every 6 hours  ·	sodium chloride 3%  Inhalation 4 Inhalation every 6 hours  ·	pantoprazole  Injectable 40 IV Push every 12 hours  ·	sucralfate suspension 1 Oral every 6 hours  ·	atorvastatin 20 Oral at bedtime  ·	dexAMETHasone  Injectable 2 IV Push every 12 hours  ·	insulin glargine Injectable (LANTUS) 20 SubCutaneous at bedtime  ·	insulin lispro (ADMELOG) corrective regimen sliding scale  SubCutaneous every 6 hours  ·	lactobacillus acidophilus 1 Oral daily  ·	multivitamin 1 Oral daily  ·	petrolatum Ophthalmic Ointment 1 Both EYES three times a day  ·	sodium chloride 3 Oral every 6 hours  ·	acetaminophen     Tablet .. 650 Oral every 6 hours PRN    IV FLUIDS: IVL  DRIPS:  ·	norepinephrine Infusion 0.05 MICROgram(s)/kG/Min IV Continuous <Continuous> 0.1 --> 0.06  ·	precedex OFF  DIET: TF Vital @ 10cc/hr  Lines:  Drains:    Wounds:    CODE STATUS:  Full Code                       GOALS OF CARE:  aggressive                      DISPOSITION:  ICU

## 2023-08-08 NOTE — PROGRESS NOTE ADULT - SUBJECTIVE AND OBJECTIVE BOX
S/Overnight events:  : switched to full vent support for tachypnea, spiked a fever, pancultured      Hospital Course:    : Admitted. Na 115 in ED with repeat 118, started 3% at 30cc/hr, and salt tabs 3q6, stability CTH in ED showing Interval development of a 13 mm hyperdense nodule along the   inferior margin of the resection cavity which may represent progression   of disease with hemorrhage, repeat CTH stable, urine studies ordered  : Neuro stable, 12AM BMP Na114 3% increased to 40cc/hr, urine studies, pancx to r/o infection since pt is immunocompromised. Changed pantoprazole to sucralfate for GI ppx d/t thrombocytopenia. LE dopplers negative. DC'd 3%. ENT consulted to assess stoma, recommend follow up upon discharge with ENT, Repeat sodium 122. Restarted 3% at 30.  : ANA LILIA o/n neuro stable. remains on 3%@30cc/hr. Started florinef, increased 3% to 50cc/hr. Increased 3% to 75cc/hr.    7/10: continued current 3% rate o/n. Na 127 --> 125, cont 3% @75cc/hr, f/u repeat Na this evening, likely stepdown tomorrow. Pend dispo home with home PT/OT when able. Heme consulted for thrombocytopenia  : ANA LILIA o/n. Remains on 3% @75cc/hr. Decreased 3% to 50cc/hr. Started 1.2L fluid restriction. Repeat Na corrected 128  : ANA LILIA overnight, remains on 3%, SDU from ICU  : ANA LILIA overnight, neuro stable, on 3%@50, Am sodium 137, decreased 3% to 25cc/hr, repeat Na 138, decreased to 15cc/hr.  : ANA LILIA overnight, neuro stable, remains on 3%@15cc/hr  7/15: ANA LILIA overnight. Neuro exam stable. Pt remains on 3% saline.Sodium 134 this AM remains on 3%@25. Per nephrology recs hypertonic Na d'ceed, Na tabs 2q6, URENA 15g BID, cont florinef/ fluid restriction.Per nephrology Na >130 ok when ready for d/c   : ANA LILIA overnight, hypertonics d/c now on urea powder/1L fluid restriction/florinef and salt tabs per renal, hydrocortisone cream ordered for rash on back, rec for Home PT  : ANA LILIA overnight, following Na, renal following, ENT saw for hoarse voice rec followup with CT surgery for possible dilation, pending Home PT. Patient developed sudden onset severe headache. Hyperventilating with increased work of breathing. Wheezing in all lung fields to auscultation. Neuro intact on exam. Duoneb x1 ordered. Dilaudid 0.25 IV given for pain control. Subsequently patient developed nausea with multiple episodes of vomiting. Hypertensive with SBP in the 190s. Given hydralazine 10mg IVP, Zofran. Stroke code and rapid response called. STAT labs sent. CTH/CTA/CTP completed. Tachycardic to the 180s, consistent with SVT, remained hypertensive. Given 10 of labetalol, SVT broke. Transferred to Telemetry. WBC 23.43, lactate 8.5. Given 1L fluid bolus. Pan culture sent. Started empirically on Vanc/Meropenem.   : Put on eeg. Voiding while retaining urine, SC for 500cc. EEG +spikes, epilepsy consulted. CTA chest and CT A/P pending. Keppra increased 1g BID.   : Cont EEG, trend Na, possible NGT today if not able to tolerate PO. Blood cx growing GS + cocci in clusters, f/u MRSA swab and pan cx, cont meropenem and vancomycin. Vanc trough in am. F/u urine studies.   : Off EEG, neuro exam improved. ID following for concern for sepsis, likely aerobic blood cx bottle staph epi contaminant, cont monitoring off of abx, possible drug reaction as cause. F/u surveillance blood cx. Hyponatremia, repeat Na 129 overnight from 130, cont current regimen and f/u am labs and nephrology recs. AM na 129 continuing fluid restriction, nephro recommending restarting urena, but holding due to possible drug reaction previously.   : neurologically stable, c/w fluid restriction, f/u AM Na, urine osm, urine Na, cortisol. Given 15mg of tolvaptan. Fluid restriction, florinef d/c'd per nephro recs. Held salt tabs for today. 6pm BMP Na 122, 10pm BMP Na 131, urine osm 141  : ANA LILIA ovn, neuro stable. AM Na 133. Restarted salt tabs 3q6 and 1L fluid restriction. Lantus 10u at bedtime added. 9:30pm BMP per nephro Na 138  : ANA LILIA ovn, neuro stable. Na stable, discussed with nephrology can cont tolvaptan 15mg daily PRN for hyponatremia, only needed for hypoNa, can discontinue fluid restriction. Pt evaluated at bedside after nurse noted patient to have some expressive aphasia and perseverating; patient oriented to self, unable to say hospital or year, following all commands and DUMONT 5/5 strength, no drift, no facial droop, perseverating on pt's  when asked other questions. Pt afebrile, vitals stable, cont keppra 1g BID, cont decadron 4mg BID, discussed with Dr. Dorado, defer CTH at this time and monitor clinically. . Lantus increased to 14u at bedtime.  : o/n PSVT 13 beats followed by sinus tachycardia in setting of anxiety and net negative fluid balance.  Resolved to 102 with verbal comfort and further resolved with 500 cc NS. Lantus increased to 18u qhs. Dr. Costa consulted.  : ANA LILIA overnight. Neuro stable.   : ANA LILIA overnight. Neuro exam stable. Dispo pending.  : multiple BMs o/n. Na stable. Lexapro increased to 10mg daily for worsening depression. Held bowel regimen 2/2 multiple stools.   : ANA LILIA overnight, Na 133 f/u AM labs, pending [place]. Pt has episode of sustained tachycardia, c/o feeling warm, and dizzy while working with Pt. Given 500cc NS bolus, rectal temp was 99F. Family brought in medical marijuana. Pt had rectal temp 100.9, pancultured. Given another 1L bolus and started on maintenance fluids. Rectal temp 102. Started empirically on vanc/cipro/flagyl. Upgraded to telemetry.  : In AM patient had episode of emesis and was unresponsive to sternal rub. Dr. Huggins, neurointensivist at bedside. Repeat BP 60s/40s, tachycardic to 110s. Rapid response called. Levo gtt started. Upgraded to ICU. Intubated for airway protection. Likely septic shock given GNR in blood cx growing K.pneumoniae, Given additional 1L bolus x2. Abx changed to vanc and meropenem, ID following, rec to d/c vanc, c/w meropenem [id number], FOBT positive, keppra changed to vimpat 50BID, fentanyl 25mg q4hrs prn for vent dysynchrony, home lexapro decreased to 5mg, decadron decreased to 2q12, sucralfate increased to 1q6 iso GI bleed, pending repeat CBC, CMP, ABG, DIC labs, xray abdomen to r/o obstruction, propofol gtt started for sedation, weaning off precedex gtt, GI consulted rec protonix 40bid iso possible GI bleed, plan for possible EGD, albumin 1.7, 25% albumin 50ml given, s/p 1u PRBC and PLT repeat cbc hgb 8.0 from 6.6 and PLT 88 from 55. Protonix gtt started per GI recs.   : ANA LILIA overnight. Neuro stable. Hemoglobin 6.5, platelets 47 o/n, transfused 1uPRBC, 1u platelets in AM. Repeat Hb 7.1 and platelets 81. CBC repeated and Hb 7.2 and platelets 74. Arnie test negative. 1 set of surveillance blood cultures sent. NS increased to 120cc/hr. Holding off on CT C/A/P per nephrology recs due to concern about IV contrast. Temp 100.8 F, given tylenol. Additional 1u PRBC given in evening for Hb 6.8.   : 1u prbc given in am. 6 units lantus started at bedtime. Per ID stopped meropenam and started ceftriaxone 1qd and flagyl 500 q8.  Plan for EGD tomorrow with GI. Blood and urine cx sent per ID recs.   : Tachycardic, gave 20 IV lasix. Hgb at the time stable. NS@20 for renal protection. lantus increased to 14u. Lactate normalized. Given 500cc bolus for soft pressures, restarted levo gtt temporarily, now off. Endoscopy done with GI showed severe circumferential esophagitis, small ulcer to anterior stomach. propofol changed to precedex.   : ANA LILIA overnight, remains sedated on precedex and intubated on ACVC.  Pt tolerating CPAP. Campuzano removed, failed TOV, and straight catheterized. Trickle feeds started 10cc/hr. Changed protonix gtt transitioned to 40mg IV BID per GI. ENT consulted for trach planning.  8/3: Remains on levo. Campuzano replaced by urology due to difficulty straight catheterizing. TF held for high residuals. 1u PRBC transfused. 250cc albumin and 500cc NS bolus. Plan for trach with ENT .  : ANA LILIA overnight. Neuro exam stable. Off levo since 12am. POD 0 trach with ENT. Increased Lantus to 20u 2/2 high FS. +RT 2/2 multiple loose stools. Nephrology consulted 2/2 urine appearance, NTD from their standpoint. S/p 2u platelets and 1u PRBCs.  : High residuals of TF, reglan 10IV q8h started. Neuro exam stable. Duonebs, 3% inhalation, mucomyst standing for secretions. Ordered repeat hemolytic studies. DC'd cental line. Holding TF for high residuals. ABD XR w/ stool burden. SBP in 100s, MAP in the low 60s. Given 250cc of albumin, started midodrine 15q8. Given 500c bolus. RT d/c'd.  : ANA LILIA o/n. Metamucil added for diarhea. Tube feeds remain paused for high residuals. Metamucil d/c'd. Trickle feeds resumed.  Platelet antibody test negative. CTH stable. POCUS w/ b-lines. Given 10 IV lasix. s/p albumin 250 cc for hypotension.   : Precedex started for increased alertness and tachypnea. Levo gtt restarted for MAP goal > 65. D/c lexapro. Florinef 0.2mg QD, Na goal 135-145. Advanced TF today, assess for intolerance. Goal to taper levo to off. EEG placed, slowing left hemispheric region no seizures.changed lantus to 10     Vital Signs Last 24 Hrs  T(C): 37.3 (08 Aug 2023 17:30), Max: 38.3 (08 Aug 2023 05:49)  T(F): 99.2 (08 Aug 2023 17:30), Max: 100.9 (08 Aug 2023 05:49)  HR: 102 (08 Aug 2023 16:45) (84 - 110)  BP: 157/69 (08 Aug 2023 16:05) (90/51 - 157/69)  BP(mean): 99 (08 Aug 2023 16:05) (65 - 99)  RR: 18 (08 Aug 2023 15:10) (17 - 28)  SpO2: 97% (08 Aug 2023 16:45) (96% - 100%)    Parameters below as of 08 Aug 2023 16:45  Patient On (Oxygen Delivery Method): ventilator    O2 Concentration (%): 40    I&O's Detail    07 Aug 2023 07:01  -  08 Aug 2023 07:00  --------------------------------------------------------  IN:    Enteral Tube Flush: 420 mL    IV PiggyBack: 50 mL    Norepinephrine: 94.3 mL    sodium chloride 0.9%: 240 mL    Vital1.0: 620 mL  Total IN: 1424.3 mL    OUT:    Dexmedetomidine: 0 mL    Indwelling Catheter - Urethral (mL): 2275 mL  Total OUT: 2275 mL    Total NET: -850.7 mL    08 Aug 2023 07:  -  08 Aug 2023 18:25  --------------------------------------------------------  IN:    Enteral Tube Flush: 60 mL    IV PiggyBack: 700 mL    Norepinephrine: 74.6 mL    Phenylephrine: 24 mL    Sodium Chloride 0.9% Bolus: 2000 mL    sodium chloride 3%: 60 mL    sodium chloride 3%: 200 mL    Vital1.0: 120 mL  Total IN: 3238.6 mL    OUT:    Indwelling Catheter - Urethral (mL): 585 mL    Nasogastric/Oral tube (mL): 500 mL  Total OUT: 1085 mL    Total NET: 2153.6 mL    I&O's Summary    07 Aug 2023 07:01  -  08 Aug 2023 07:00  --------------------------------------------------------  IN: 1424.3 mL / OUT: 2275 mL / NET: -850.7 mL    08 Aug 2023 07:01  -  08 Aug 2023 18:25  --------------------------------------------------------  IN: 3238.6 mL / OUT: 1085 mL / NET: 2153.6 mL      PHYSICAL EXAM:  General: Pt sitting up in bed, on full vent support  HEENT: PERRL 3mm, L gaze, OE spontaneously, tracks to the L  Cardiovascular: RRR, normal S1 and S2   Respiratory: non-labored breathing, symmetric chest rise, on trach to full vent  GI: abd soft, NTND   Neuro: A&O x 0, not following commands, OE spontaneously   BL UE withdraw to noxious   BL LE triple flexion to noxious  Wounds: tracheostomy site dressing dry, sutures in place    TUBES/LINES:  [] CVC  [X] A-line  [] Lumbar Drain  [] Ventriculostomy  [] Other    DIET:  [] NPO  [] Mechanical  [x] Tube feeds-- HELD     LABS:    Urinalysis Basic - ( 08 Aug 2023 15:50 )    Color: x / Appearance: x / SG: x / pH: x  Gluc: 143 mg/dL / Ketone: x  / Bili: x / Urobili: x   Blood: x / Protein: x / Nitrite: x   Leuk Esterase: x / RBC: x / WBC x   Sq Epi: x / Non Sq Epi: x / Bacteria: x    CAPILLARY BLOOD GLUCOSE    POCT Blood Glucose.: 107 mg/dL (08 Aug 2023 16:25)  POCT Blood Glucose.: 117 mg/dL (08 Aug 2023 11:49)  POCT Blood Glucose.: 95 mg/dL (08 Aug 2023 05:37)  POCT Blood Glucose.: 118 mg/dL (07 Aug 2023 23:28)  POCT Blood Glucose.: 105 mg/dL (07 Aug 2023 21:56)    Allergies    amoxicillin (Rash)  ure-Na (Rash; Fever; Hypotension)    Intolerances    MEDICATIONS:  Antibiotics:  metroNIDAZOLE  IVPB 500 milliGRAM(s) IV Intermittent every 8 hours  piperacillin/tazobactam IVPB.- 4.5 Gram(s) IV Intermittent once  vancomycin  IVPB 1000 milliGRAM(s) IV Intermittent every 12 hours    Neuro:  acetaminophen     Tablet .. 650 milliGRAM(s) Oral every 6 hours PRN  fentaNYL    Injectable 25 MICROGram(s) IV Push every 2 hours PRN  lacosamide IVPB 50 milliGRAM(s) IV Intermittent every 12 hours  metoclopramide Injectable 10 milliGRAM(s) IV Push every 8 hours    Anticoagulation:    OTHER:  acetylcysteine 10%  Inhalation 4 milliLiter(s) Inhalation every 6 hours  albuterol/ipratropium for Nebulization 3 milliLiter(s) Nebulizer every 6 hours  atorvastatin 20 milliGRAM(s) Oral at bedtime  chlorhexidine 0.12% Liquid 15 milliLiter(s) Oral Mucosa every 12 hours  chlorhexidine 2% Cloths 1 Application(s) Topical <User Schedule>  dexAMETHasone  Injectable 2 milliGRAM(s) IV Push every 12 hours  fludroCORTISONE 0.2 milliGRAM(s) Oral daily  insulin lispro (ADMELOG) corrective regimen sliding scale   SubCutaneous every 6 hours  lactobacillus acidophilus 1 Tablet(s) Oral daily  midodrine 15 milliGRAM(s) Oral every 8 hours  norepinephrine Infusion 0.05 MICROgram(s)/kG/Min IV Continuous <Continuous>  pantoprazole  Injectable 40 milliGRAM(s) IV Push every 12 hours  petrolatum Ophthalmic Ointment 1 Application(s) Both EYES three times a day  phenylephrine    Infusion 0.1 MICROgram(s)/kG/Min IV Continuous <Continuous>  simethicone 80 milliGRAM(s) Chew every 6 hours  sodium chloride 3%  Inhalation 4 milliLiter(s) Inhalation every 6 hours  sucralfate suspension 1 Gram(s) Oral every 6 hours    IVF:  multivitamin 1 Tablet(s) Oral daily  sodium chloride 3 Gram(s) Oral every 6 hours  sodium chloride 3%. 500 milliLiter(s) IV Continuous <Continuous>    CULTURES:  Culture Results:   No growth at 12 hours ( @ 03:35)  Culture Results:   Culture in progress ( @ 03:35)            [email or web address]

## 2023-08-08 NOTE — EEG REPORT - NS EEG TEXT BOX
[facility] Department of Neurology  Inpatient Continuous video-Electroencephalogram    Patient Name:	JORDAN CHAKRABORTY    :	1955  MRN:	[id number]    Study Start Date/Time:  2023, 10:30:13 AM  Study End Date/Time: in progress    Referred by: Wood Dorado MD    Brief Clinical History:  JORDAN CHAKRABORTY is a 68-year-old man with GBM s/p resection and Klebsiella bacteremia; study performed to investigate for seizures or markers of epilepsy.    Diagnosis Code:   R56.9 convulsions/seizure  The live video was: unmonitored.    Pertinent Medications:  Vimpat 50 mg BID    Acquisition Details:  Electroencephalography was acquired using a minimum of 21 channels on an [employer] Neurology system v 9.3.1 with electrode placement according to the standard International 10-20 system following ACNS (American Clinical Neurophysiology Society) guidelines for Long-Term Video EEG monitoring.  Anterior temporal T1 and T2 electrodes were utilized whenever possible.   The [employer] automated spike & seizure detections were all reviewed in detail, in addition to extensive portions of raw EEG.      Day 1: 2023 @ 10:30:13 AM to next morning @ 07:00 AM    Background:  continuous, with mixed alpha, theta, and delta frequencies.  Symmetry:  Continuous (90+%) high amplitude sharply contoured theta/delta frequency slowing in the left frontal region (F3/F7).  Posterior Dominant Rhythm:  Fragments of 7 Hz PDR, better formed over the right hemisphere.  Organization: Appropriate anterior-posterior gradient.  Voltage:  Normal (20+ uV)  Variability: Yes. 		Reactivity: Yes.  N2 sleep: Rudimentary N2 transients present.  Spontaneous Activity:  No epileptiform discharges.  Periodic/rhythmic activity:  None  Events:  No electrographic seizures or significant clinical events.  Provocations:  Hyperventilation and Photic stimulation: was not performed.    Daily Summary:    1)	Left frontal sharply contoured slowing, indicating focal cerebral dysfunction and consistent with tumor resection in this region.  2)	Moderate generalized background slowing, indicating a moderate degree of diffuse or multifocal cerebral dysfunction.  3)	No epileptiform activity and no significant clinical events occurred.      Abbey Rodrigues MD  Attending Neurologist, [facility] Epile[facility]     [facility] Department of Neurology  Inpatient Continuous video-Electroencephalogram    Patient Name:	JORDAN CHAKRABORTY    :	1955  MRN:	[id number]    Study Start Date/Time:  2023, 10:30:13 AM  Study End Date/Time: 2023, 11:06 AM    Referred by: Wood Dorado MD    Brief Clinical History:  JORDAN CHAKRABORTY is a 68-year-old man with GBM s/p resection and Klebsiella bacteremia; study performed to investigate for seizures or markers of epilepsy.    Diagnosis Code:   R56.9 convulsions/seizure  The live video was: unmonitored.    Pertinent Medications:  Vimpat 50 mg BID    Acquisition Details:  Electroencephalography was acquired using a minimum of 21 channels on an [employer] Neurology system v 9.3.1 with electrode placement according to the standard International 10-20 system following ACNS (American Clinical Neurophysiology Society) guidelines for Long-Term Video EEG monitoring.  Anterior temporal T1 and T2 electrodes were utilized whenever possible.   The [employer] automated spike & seizure detections were all reviewed in detail, in addition to extensive portions of raw EEG.    Background:  continuous, with mixed alpha, theta, and delta frequencies.  Symmetry:  Continuous (90+%) high amplitude sharply contoured theta/delta frequency slowing in the left frontal region (F3/F7).  Posterior Dominant Rhythm:  Fragments of 7 Hz PDR, better formed over the right hemisphere.  Organization: Appropriate anterior-posterior gradient.  Voltage:  Normal (20+ uV)  Variability: Yes. 		Reactivity: Yes.  N2 sleep: Rudimentary N2 transients present.  Spontaneous Activity:  No epileptiform discharges.  Periodic/rhythmic activity:  None  Events:  No electrographic seizures or significant clinical events.  Provocations:  Hyperventilation and Photic stimulation: was not performed.    Summary:    1)	Left frontal sharply contoured slowing, indicating focal cerebral dysfunction and consistent with tumor resection in this region.  2)	Moderate generalized background slowing, indicating a moderate degree of diffuse or multifocal cerebral dysfunction.  3)	No epileptiform activity and no significant clinical events occurred.    Clinical Correlation:  There were no findings of active epilepsy, however this alone does not rule out the diagnosis.           Abbey Rodrigues MD  Attending Neurologist, [facility] Epilepsy Program

## 2023-08-08 NOTE — PROVIDER CONTACT NOTE (OTHER) - ASSESSMENT
AXOX3, sbp 162. Pt appears in no acute distress. Neuro exam unchanged.
Neuro exam at baseline, patient more restless and agitated fidgety overnight.
Pt alert and awake to touch, disoriented to time. Appears to be slightly restless in bed.
Pt showed signs of expressive aphasia. Pt advised he feels tired and tried to correct some of his incorrect answers. Pt has full strength in all extremities. No slurred speech. Pt u/o was 250 and tea colored.
BP 89/55, , Satting 96% on RA, RR 19.
Hemodynamically stable, sinus tachy, overbreathing vent
Pt alert and oriented x2 to place and person. Strong grasps no drifts, pupils unchanged. Able to say his name and nod to the correct choice of place.
Pt alert and oriented x2-3. in bed restless and trying to climb out without assistance, sitter in room and wrist restraints on to prevent patient from pulling EEG wires off.
pt could no tolerate PO meds
After 1L NS bolus over an hour, BP 91/53, HR 98. Satting 95% on RA, RR 18.
Pt alert and oriented x2, neuro exam at baseline.
Pt VSS, overbreathing the vent & belly breathing. Pt abdomen visibly distended & not as soft as previously.
pt belly breathing, tachypneic , started destating.
Neuro exam at baseline, hemodynamically stable. Levo gtt off
sutures are too tight, making it very difficult to place allevyn under.

## 2023-08-08 NOTE — PROCEDURE NOTE - NSPOSTCAREGUIDE_GEN_A_CORE
Verbal/written post procedure instructions were given to patient/caregiver
Care for catheter as per unit/ICU protocols
Verbal/written post procedure instructions were given to patient/caregiver/Instructed patient/caregiver regarding signs and symptoms of infection/Care for catheter as per unit/ICU protocols
Verbal/written post procedure instructions were given to patient/caregiver
Verbal/written post procedure instructions were given to patient/caregiver/Care for catheter as per unit/ICU protocols

## 2023-08-08 NOTE — PROGRESS NOTE ADULT - ASSESSMENT
68y/M with  severe hyponatremia  GBM brain compression cerebral edema   Diabetes Mellitus Hypertension dyslipidemia  iron deficiency anemia  nephrolithiasis  thrombocytopenia  BPH    PLAN:   NEURO: neurochecks q4h, VS q1h, PRN pain meds with acetaminophen, PRN fentanyl  GBM - no neurosurgical plans, palliative care, continue steroids  MRI   encephalopathy: sepsis, metabolic, drug, seizures; d/c escitalopram EEG ammonium levels, check lacosamide  REHAB:  physical therapy evaluation and management    EARLY MOB:   OOB to chair, ambulate    PULM:  CPAP 10/5, pulmo toilette, nebs  CARDIO:  MAP 65-70, taper levo   ENDO:  Blood sugar goals 140-180 mg/dL, continue insulin sliding scale, insulin glargine keep at 20 units HS unless fingersticks drop <150  GI:  cont sucralfate, PPI  DIET: advance TF to goal 40  RENAL: Na goal 135-145, restart fludrocortisone, cont salt tabs, recheck in AM, no IVF, POCUS  HEM/ONC: Hb stable  VTE Prophylaxis: SCDs, no DVT chemoprophylaxis for now as patient is high risk for bleed (thrombocytopenia requiring platelet transfusion)  ID: afebrile, no leukocytosis, PCT, panculture if fever spike  Social: GOC discussion with family, son and daughter, son-in-law    Active issues:  What's keeping patient in the ICU? [facility] vent  What is this patient's dispo plan?     ATTENDING ATTESTATION:  I was physically present for the key portions of the evaluation and management (E/M) service provided.  I agree with the above history, physical and plan, which I have reviewed and edited where appropriate.    Patient at high risk for neurological deterioration or death due to:  ICU delirium, aspiration PNA, DVT / PE.  Critical care time:  I have personally provided 60 minutes of critical care time, excluding time spent on separate procedures.      Plan discussed with RN, house staff. 68y/M with  severe hyponatremia  GBM brain compression cerebral edema   Diabetes Mellitus Hypertension dyslipidemia  iron deficiency anemia  nephrolithiasis  thrombocytopenia  BPH    PLAN:   NEURO: neurochecks q4h, VS q1h, PRN pain meds with acetaminophen, PRN fentanyl  GBM - no neurosurgical plans, palliative care, continue steroids  MRI   encephalopathy: sepsis, metabolic, drug, seizures; d/c escitalopram EEG ammonium levels, check lacosamide  REHAB:  physical therapy evaluation and management    EARLY MOB:   OOB to chair, ambulate    PULM:  continue CPAP 10/5, pulmo toilette, nebs, reglan for hiccups  CARDIO:  MAP 65-70, taper levo   ENDO:  Blood sugar goals 140-180 mg/dL, continue insulin sliding scale, insulin glargine keep at 20 units HS unless fingersticks drop <150  GI:  cont sucralfate, PPI; AXR  DIET: hold tube feeds for now  RENAL: Na goal 135-145, cont fludrocortisone, salt tabs, cont 3% at 30 cc/hr, recheck BMp this afternoon, NS@40cc/hr  HEM/ONC: Hb stable  VTE Prophylaxis: SCDs, no DVT chemoprophylaxis for now as patient is high risk for bleed (thrombocytopenia requiring platelet transfusion)  ID: afebrile, no leukocytosis, PCT, new fever, f/u cultures, no ABx  Social: GOC discussion with family, son and daughter, son-in-law    Active issues:  What's keeping patient in the ICU? [facility] vent  What is this patient's dispo plan?     ATTENDING ATTESTATION:  I was physically present for the key portions of the evaluation and management (E/M) service provided.  I agree with the above history, physical and plan, which I have reviewed and edited where appropriate.    Patient at high risk for neurological deterioration or death due to:  ICU delirium, aspiration PNA, DVT / PE.  Critical care time:  I have personally provided 60 minutes of critical care time, excluding time spent on separate procedures.      Plan discussed with RN, house staff.

## 2023-08-08 NOTE — PROGRESS NOTE ADULT - SUBJECTIVE AND OBJECTIVE BOX
NSCU ATTENDING -- ADDITIONAL PROGRESS NOTE    Nighttime rounds were performed     69 y/o male with PMH of type 2 diabetes, hyperlipidemia, iron deficiency anemia, tracheal stenosis s/p tracheostomy, glioblastoma s/p resection 1/27/2022, and Avastin treatment 3/2023 presenting with altered mental status and weakness x2 days. Per family, LKN 6pm on 07/05 when he was at baseline. Patient has intermittent expressive aphasia but is getting worse and now is persistent, and has intermittent nausea and vomiting since 07/05. Patient was scheduled to have outpatient brain MRI on 7/25/2023.  Stroke code called in ED. Head CT shows new hyperdensity in frontal parietal - surgical site. MRI ordered to rule out tumor recurrence.   Per daughter at bedside, Entresto and Eliquis were stopped per his PC - cardiologist.  (07 Jul 2023 13:10)      ICU Vital Signs Last 24 Hrs  T(C): 37.3 (08 Aug 2023 17:30), Max: 38.3 (08 Aug 2023 05:49)  T(F): 99.2 (08 Aug 2023 17:30), Max: 100.9 (08 Aug 2023 05:49)  HR: 106 (08 Aug 2023 19:20) (84 - 110)  BP: 97/53 (08 Aug 2023 19:20) (80/52 - 157/69)  BP(mean): 72 (08 Aug 2023 19:20) (62 - 99)  ABP: 109/39 (08 Aug 2023 19:20) (84/27 - 144/59)  ABP(mean): 62 (08 Aug 2023 19:20) (43 - 92)  RR: 22 (08 Aug 2023 18:00) (17 - 28)  SpO2: 92% (08 Aug 2023 19:20) (92% - 100%)      08-07-23 @ 07:01  -  08-08-23 @ 07:00  --------------------------------------------------------  IN: 1424.3 mL / OUT: 2275 mL / NET: -850.7 mL    08-08-23 @ 07:01 - 08-08-23 @ 20:17  --------------------------------------------------------  IN: 3430.7 mL / OUT: 1415 mL / NET: 2015.7 mL      Mode: AC/ CMV (Assist Control/ Continuous Mandatory Ventilation), RR (machine): 12, TV (machine): 400, FiO2: 40, PEEP: 5, ITime: 1, MAP: 5.7, PIP: 9      MEDICATIONS:   acetaminophen     Tablet .. 650 milliGRAM(s) Oral every 6 hours PRN  acetylcysteine 10%  Inhalation 4 milliLiter(s) Inhalation every 6 hours  albuterol/ipratropium for Nebulization 3 milliLiter(s) Nebulizer every 6 hours  atorvastatin 20 milliGRAM(s) Oral at bedtime  chlorhexidine 0.12% Liquid 15 milliLiter(s) Oral Mucosa every 12 hours  chlorhexidine 2% Cloths 1 Application(s) Topical <User Schedule>  chlorhexidine 4% Liquid 1 Application(s) Topical <User Schedule>  cisatracurium Infusion 3 MICROgram(s)/kG/Min (13.1 mL/Hr) IV Continuous <Continuous>  cisatracurium Injectable 10 milliGRAM(s) IV Push once  cisatracurium Injectable 10 milliGRAM(s) IV Push once  dexAMETHasone  Injectable 2 milliGRAM(s) IV Push every 12 hours  fentaNYL    Injectable 50 MICROGram(s) IV Push once  fentaNYL    Injectable 25 MICROGram(s) IV Push every 2 hours PRN  fludroCORTISONE 0.2 milliGRAM(s) Oral daily  insulin lispro (ADMELOG) corrective regimen sliding scale   SubCutaneous every 6 hours  lacosamide IVPB 50 milliGRAM(s) IV Intermittent every 12 hours  lactobacillus acidophilus 1 Tablet(s) Oral daily  metoclopramide Injectable 10 milliGRAM(s) IV Push every 8 hours  metroNIDAZOLE  IVPB 500 milliGRAM(s) IV Intermittent every 8 hours  midodrine 15 milliGRAM(s) Oral every 8 hours  multivitamin 1 Tablet(s) Oral daily  norepinephrine Infusion 0.05 MICROgram(s)/kG/Min (6.81 mL/Hr) IV Continuous <Continuous>  pantoprazole  Injectable 40 milliGRAM(s) IV Push every 12 hours  petrolatum Ophthalmic Ointment 1 Application(s) Both EYES three times a day  phenylephrine    Infusion 0.1 MICROgram(s)/kG/Min (2.72 mL/Hr) IV Continuous <Continuous>  piperacillin/tazobactam IVPB.- 4.5 Gram(s) IV Intermittent once  propofol Infusion 50 MICROgram(s)/kG/Min (21.8 mL/Hr) IV Continuous <Continuous>  simethicone 80 milliGRAM(s) Chew every 6 hours  sodium chloride 3 Gram(s) Oral every 6 hours  sodium chloride 0.9% lock flush 10 milliLiter(s) IV Push every 1 hour PRN  sodium chloride 3%  Inhalation 4 milliLiter(s) Inhalation every 6 hours  sodium chloride 3%. 500 milliLiter(s) (30 mL/Hr) IV Continuous <Continuous>  sucralfate suspension 1 Gram(s) Oral every 6 hours  vancomycin  IVPB 1000 milliGRAM(s) IV Intermittent every 12 hours                          8.8    6.01  )-----------( 80       ( 08 Aug 2023 15:50 )             26.1     08-08    132<L>  |  105  |  13  ----------------------------<  143<H>  3.8   |  19<L>  |  0.41<L>    Ca    7.3<L>      08 Aug 2023 15:50  Phos  3.8     08-08  Mg     1.8     08-08    TPro  4.3<L>  /  Alb  1.9<L>  /  TBili  1.7<H>  /  DBili  x   /  AST  19  /  ALT  15  /  AlkPhos  70  08-08    LIVER FUNCTIONS - ( 08 Aug 2023 15:50 )  Alb: 1.9 g/dL / Pro: 4.3 g/dL / ALK PHOS: 70 U/L / ALT: 15 U/L / AST: 19 U/L / GGT: x           ABG - ( 08 Aug 2023 20:00 )  pH, Arterial: 7.38  pH, Blood: x     /  pCO2: 27    /  pO2: 61    / HCO3: 16    / Base Excess: -7.6  /  SaO2: 91.8        Culture - Blood (collected 08-08-23 @ 03:35)  Source: .Blood Blood-Peripheral  Gram Stain (08-08-23 @ 17:52):    Anaerobic Bottle: Gram Positive Rods    Result called to and read back by_ LUCY Wilder RN ([id number])   08/08/2023    17:52:33    ***Blood Panel PCR results on this specimen are available    approximately 3 hours after the Gram stain result.***    Gram stain, PCR, and/or culture results may not always    correspond due to difference in methodologies.    ************************************************************    This PCR assay was performed using [employer] panel.    This assay tests for bacterial, fungal and resistance gene    targets.  Preliminary Report (08-08-23 @ 17:52):    Culture in progress  Organism: Blood Culture PCR (08-08-23 @ 19:18)  Organism: Blood Culture PCR (08-08-23 @ 19:18)      Method Type: PCR      -  Acinetobacter baumanii: Nondet      -  Bacteroides fragilis: Nondet      -  Enterobacter cloacae complex: Nondet      -  Escherichia coli: Nondet      -  Enterobacterales: Nondet      -  Haemophilus influenzae: Nondet      -  Klebsiella aerogenes: Nondet      -  Klebsiella oxytoca: Nondet      -  K. pneumoniae group: Nondet      -  Neisseria meningitidis: Nondet      -  Proteus species: Nondet      -  Pseudomonas aeruginosa: Nondet      -  Salmonella species: Nondet      -  Serratia marcescens: Nondet      -  Stenotrophomonas maltophilia: Nondet      -  Enterococcus faecalis: Nondet      -  Enterococcus faecalis,VRE: Nondet      -  Enterococcus faecium: Nondet      -  Enterococcus faecium,VRE: Nondet      -  Listeria monocytogenes: Nondet      -  Coagulase negative Staphylococcus: Nondet      -  Coagulase negative Staphylococcus, Methicillin resistant: Nondet      -  Methicillin SENSITIVE Staphylococcus aureus (MSSA): Nondet      -  Methicillin resistant Staphylococcus aureus (MRSA): Nondet      -  Staphylococcus epidermidis: Nondet      -  Staphylococcus epidermidis, Methicillin resistant: Nondet      -  Staphylococcus lugdunensis: Nondet      -  Staphylococcus lugdunensis, Methicillin resistant: Nondet      -  Streptococcus sp. (Not Grp A, B or S pneumoniae): Nondet      -  Streptococcus agalactiae (Group B): Nondet      -  Streptococcus pneumoniae: Nondet      -  Streptococcus pyogenes (Group A): Nondet      -  Candida albicans: Nondet      -  Candida auris: Nondet      -  Candida glabrata: Nondet      -  Candida krusei: Nondet      -  Candida parapsilosis: Nondet      -  Candida tropicalis: Nondet      -  Cryptococcus neoformans: Nondet    Culture - Blood (collected 08-08-23 @ 03:35)  Source: .Blood Blood-Peripheral  Preliminary Report (08-08-23 @ 18:00):    No growth at 12 hours        ASSESSMENT:   69 y/o M with septic shock secondary to Klebsiella bacteremia   Refractory shock  ARDS  GI bleed  GBM s/p resection, recent chemotherapy  History of Takotsubo cardiomyopathy  Chronic hyponatremia secondary to SIADH  Type 2 DM      PLAN  NEURO:  Neurochecks Q4  Analgesia: Fentanyl gtt  Sedation: Propofol  Paralytic  CT head 8/6: Stable  Seizure history: Continue vimpat. VEEG negative DC.  Cerebral edema: On dexamethasone 2mg Q12  Activity: Bedrest.   Palliative care following    PULMONARY: Concern for ARDS  P/F ratio:   Lung protective ventilation  CXR, ABG  VAP bundle  Pulmonary toilet  POCUS    CARDIOVASCULAR: Hypotension in setting of likely septic shock; bacteremia. Recurrent  MAP goal 65-75.  Pressors:   TTE: EF 60-65%. No WMA, no vegetation  Lactate:   Consider stress dose steroids  POCUS 8/8  Flotrac  Troponin, proBNP, EKG    GASTROENTEROLOGY: Esophagitis and non-bleeding ulcer, r/o ileus.  GI ppx: PPI bid. Continue sucralfate  Will need PEG eventually; gen surg  AXR 8/5- no significant ileus. 8/8  On reglan. LBM: 8/6.   Keep NPO for now. NGT to LIWS  GI following    RENAL/: Chronic hyponatremia secondary to SIADH, urine retention. Difficult catheterisation.  Hyponatremia  Campuzano catheter placed by Urology. Maintain per Urology. Monitor Is/Os  Na goal 135-145; repeat BMP  Hypertonics:   Urology/nephrology following    ENDOCRINE: Diabetes mellitus  A1c- 5.7  ISS while NPO. Lantus DCed    HEME/ONC: Pancytopenia likely chemo-induced vs sepsis, bone marrow suppression, R/O hemolytic process  DIC ruled out.   Trend H/H and plt. Hb goal >7.0, plt goal >50  Plt antibody negative  DVT ppx: Will hold chemical DVT ppx in setting of low platelets/ bleed  B/L SCDs  Heme following    INFECTIOUS: S/P Klebsiella bacteremia. Now with recurrent GPR bacteremia  S/P CTX and flagyl until 8/5-DCed. On Vanc/Zosyn/flagyl  ID following  Cultures    MISC:  Family meeting scheduled for 8/10.   Given sudden deterioration and change on clinical status, contact daughter Rony Will be present asap.     SOCIAL/FAMILY:  [] awaiting [x] updated daughter and son-in- law at bedside [] family meeting    CODE STATUS:  [x] Full Code [] DNR [] DNI [] Palliative/Comfort Care    DISPOSITION:  [x] ICU [] Stroke Unit [] Floor [] EMU [] RCU [] PCU    Additional critical care time: 75 minutes                                   NSCU ATTENDING -- ADDITIONAL PROGRESS NOTE    Nighttime rounds were performed     67 y/o male with PMH of type 2 diabetes, hyperlipidemia, iron deficiency anemia, tracheal stenosis s/p tracheostomy, glioblastoma s/p resection 1/27/2022, and Avastin treatment 3/2023 presenting with altered mental status and weakness x2 days. Per family, LKN 6pm on 07/05 when he was at baseline. Patient has intermittent expressive aphasia but is getting worse and now is persistent, and has intermittent nausea and vomiting since 07/05. Patient was scheduled to have outpatient brain MRI on 7/25/2023.  Stroke code called in ED. Head CT shows new hyperdensity in frontal parietal - surgical site. MRI ordered to rule out tumor recurrence.   Per daughter at bedside, Entresto and Eliquis were stopped per his PC - cardiologist.  (07 Jul 2023 13:10)      ICU Vital Signs Last 24 Hrs  T(C): 37.3 (08 Aug 2023 17:30), Max: 38.3 (08 Aug 2023 05:49)  T(F): 99.2 (08 Aug 2023 17:30), Max: 100.9 (08 Aug 2023 05:49)  HR: 106 (08 Aug 2023 19:20) (84 - 110)  BP: 97/53 (08 Aug 2023 19:20) (80/52 - 157/69)  BP(mean): 72 (08 Aug 2023 19:20) (62 - 99)  ABP: 109/39 (08 Aug 2023 19:20) (84/27 - 144/59)  ABP(mean): 62 (08 Aug 2023 19:20) (43 - 92)  RR: 22 (08 Aug 2023 18:00) (17 - 28)  SpO2: 92% (08 Aug 2023 19:20) (92% - 100%)      08-07-23 @ 07:01  -  08-08-23 @ 07:00  --------------------------------------------------------  IN: 1424.3 mL / OUT: 2275 mL / NET: -850.7 mL    08-08-23 @ 07:01 - 08-08-23 @ 20:17  --------------------------------------------------------  IN: 3430.7 mL / OUT: 1415 mL / NET: 2015.7 mL      Mode: AC/ CMV (Assist Control/ Continuous Mandatory Ventilation), RR (machine): 12, TV (machine): 400, FiO2: 40, PEEP: 5, ITime: 1, MAP: 5.7, PIP: 9      MEDICATIONS:   acetaminophen     Tablet .. 650 milliGRAM(s) Oral every 6 hours PRN  acetylcysteine 10%  Inhalation 4 milliLiter(s) Inhalation every 6 hours  albuterol/ipratropium for Nebulization 3 milliLiter(s) Nebulizer every 6 hours  atorvastatin 20 milliGRAM(s) Oral at bedtime  chlorhexidine 0.12% Liquid 15 milliLiter(s) Oral Mucosa every 12 hours  chlorhexidine 2% Cloths 1 Application(s) Topical <User Schedule>  chlorhexidine 4% Liquid 1 Application(s) Topical <User Schedule>  cisatracurium Infusion 3 MICROgram(s)/kG/Min (13.1 mL/Hr) IV Continuous <Continuous>  cisatracurium Injectable 10 milliGRAM(s) IV Push once  cisatracurium Injectable 10 milliGRAM(s) IV Push once  dexAMETHasone  Injectable 2 milliGRAM(s) IV Push every 12 hours  fentaNYL    Injectable 50 MICROGram(s) IV Push once  fentaNYL    Injectable 25 MICROGram(s) IV Push every 2 hours PRN  fludroCORTISONE 0.2 milliGRAM(s) Oral daily  insulin lispro (ADMELOG) corrective regimen sliding scale   SubCutaneous every 6 hours  lacosamide IVPB 50 milliGRAM(s) IV Intermittent every 12 hours  lactobacillus acidophilus 1 Tablet(s) Oral daily  metoclopramide Injectable 10 milliGRAM(s) IV Push every 8 hours  metroNIDAZOLE  IVPB 500 milliGRAM(s) IV Intermittent every 8 hours  midodrine 15 milliGRAM(s) Oral every 8 hours  multivitamin 1 Tablet(s) Oral daily  norepinephrine Infusion 0.05 MICROgram(s)/kG/Min (6.81 mL/Hr) IV Continuous <Continuous>  pantoprazole  Injectable 40 milliGRAM(s) IV Push every 12 hours  petrolatum Ophthalmic Ointment 1 Application(s) Both EYES three times a day  phenylephrine    Infusion 0.1 MICROgram(s)/kG/Min (2.72 mL/Hr) IV Continuous <Continuous>  piperacillin/tazobactam IVPB.- 4.5 Gram(s) IV Intermittent once  propofol Infusion 50 MICROgram(s)/kG/Min (21.8 mL/Hr) IV Continuous <Continuous>  simethicone 80 milliGRAM(s) Chew every 6 hours  sodium chloride 3 Gram(s) Oral every 6 hours  sodium chloride 0.9% lock flush 10 milliLiter(s) IV Push every 1 hour PRN  sodium chloride 3%  Inhalation 4 milliLiter(s) Inhalation every 6 hours  sodium chloride 3%. 500 milliLiter(s) (30 mL/Hr) IV Continuous <Continuous>  sucralfate suspension 1 Gram(s) Oral every 6 hours  vancomycin  IVPB 1000 milliGRAM(s) IV Intermittent every 12 hours      LABS:                      8.8    6.01  )-----------( 80       ( 08 Aug 2023 15:50 )             26.1     08-08    132<L>  |  105  |  13  ----------------------------<  143<H>  3.8   |  19<L>  |  0.41<L>    Ca    7.3<L>      08 Aug 2023 15:50  Phos  3.8     08-08  Mg     1.8     08-08    TPro  4.3<L>  /  Alb  1.9<L>  /  TBili  1.7<H>  /  DBili  x   /  AST  19  /  ALT  15  /  AlkPhos  70  08-08    LIVER FUNCTIONS - ( 08 Aug 2023 15:50 )  Alb: 1.9 g/dL / Pro: 4.3 g/dL / ALK PHOS: 70 U/L / ALT: 15 U/L / AST: 19 U/L / GGT: x           ABG - ( 08 Aug 2023 20:00 )  pH, Arterial: 7.38  pH, Blood: x     /  pCO2: 27    /  pO2: 61    / HCO3: 16    / Base Excess: -7.6  /  SaO2: 91.8        Culture - Blood (collected 08-08-23 @ 03:35)  Source: .Blood Blood-Peripheral  Gram Stain (08-08-23 @ 17:52):    Anaerobic Bottle: Gram Positive Rods    Result called to and read back byEmil Wilder RN ([id number])   08/08/2023    17:52:33    ***Blood Panel PCR results on this specimen are available    approximately 3 hours after the Gram stain result.***    Gram stain, PCR, and/or culture results may not always    correspond due to difference in methodologies.    ************************************************************    This PCR assay was performed using [employer] panel.    This assay tests for bacterial, fungal and resistance gene    targets.  Preliminary Report (08-08-23 @ 17:52):    Culture in progress  Organism: Blood Culture PCR (08-08-23 @ 19:18)  Organism: Blood Culture PCR (08-08-23 @ 19:18)      Method Type: PCR      -  Acinetobacter baumanii: Nondet      -  Bacteroides fragilis: Nondet      -  Enterobacter cloacae complex: Nondet      -  Escherichia coli: Nondet      -  Enterobacterales: Nondet      -  Haemophilus influenzae: Nondet      -  Klebsiella aerogenes: Nondet      -  Klebsiella oxytoca: Nondet      -  K. pneumoniae group: Nondet      -  Neisseria meningitidis: Nondet      -  Proteus species: Nondet      -  Pseudomonas aeruginosa: Nondet      -  Salmonella species: Nondet      -  Serratia marcescens: Nondet      -  Stenotrophomonas maltophilia: Nondet      -  Enterococcus faecalis: Nondet      -  Enterococcus faecalis,VRE: Nondet      -  Enterococcus faecium: Nondet      -  Enterococcus faecium,VRE: Nondet      -  Listeria monocytogenes: Nondet      -  Coagulase negative Staphylococcus: Nondet      -  Coagulase negative Staphylococcus, Methicillin resistant: Nondet      -  Methicillin SENSITIVE Staphylococcus aureus (MSSA): Nondet      -  Methicillin resistant Staphylococcus aureus (MRSA): Nondet      -  Staphylococcus epidermidis: Nondet      -  Staphylococcus epidermidis, Methicillin resistant: Nondet      -  Staphylococcus lugdunensis: Nondet      -  Staphylococcus lugdunensis, Methicillin resistant: Nondet      -  Streptococcus sp. (Not Grp A, B or S pneumoniae): Nondet      -  Streptococcus agalactiae (Group B): Nondet      -  Streptococcus pneumoniae: Nondet      -  Streptococcus pyogenes (Group A): Nondet      -  Candida albicans: Nondet      -  Candida auris: Nondet      -  Candida glabrata: Nondet      -  Candida krusei: Nondet      -  Candida parapsilosis: Nondet      -  Candida tropicalis: Nondet      -  Cryptococcus neoformans: Nondet    Culture - Blood (collected 08-08-23 @ 03:35)  Source: .Blood Blood-Peripheral  Preliminary Report (08-08-23 @ 18:00):    No growth at 12 hours      ASSESSMENT:   67 y/o M with septic shock secondary to Klebsiella bacteremia   Recurrent shock- likely septic secondary to PNA ?aspiration  Hypoxemic respiratory failure  ARDS  Bacteremia  GI bleed  GBM s/p resection, recent chemotherapy  History of Takotsubo cardiomyopathy  Chronic hyponatremia secondary to SIADH  Type 2 DM      PLAN  NEURO:  Pupil checks Q1  Analgesia: Fentanyl gtt  Sedation: Propofol RASS neg 5  Paralytic: Nimbex train of four 2/4   CT head 8/6: Stable. No significant heme  Seizure history: Continue vimpat. VEEG negative for seizures. DCed.  Cerebral edema: On dexamethasone 2mg Q12  Activity: Bedrest.   Palliative care following    PULMONARY: Concern for ARDS likely secondary to aspiration  P/F ratio: 61  Lung protective ventilation. Monitor peak/plateau  CXR, ABG  VAP bundle. Pulmonary toilet  POCUS: B lines R>L. IVC difficult to visualize. B/L effusions.     CARDIOVASCULAR: Hypotension in setting of likely septic shock (recurrent); bacteremia.   MAP goal 65-75  Pressors: Levophed and vasopressin  TTE: EF 60-65%. No WMA, no vegetation  Lactate: 2.5. Trend   Consider stress dose steroids  POCUS- see pulm  Flotrac, SCVO2  Troponin, proBNP, EKG    GASTROENTEROLOGY: Esophagitis and non-bleeding ulcer, r/o ileus.  GI ppx: PPI bid. Continue sucralfate  Will need PEG eventually; gen surg  AXR 8/5- no significant ileus. AXR 8/8:  On reglan. LBM: 8/6.   Keep NPO for now. NGT to LIWS  GI following    RENAL/: Chronic hyponatremia secondary to SIADH, urine retention. Difficult catheterisation.  Hyponatremia- improving. Hypertonics: 3% at 30cc/hr  Campuzano catheter placed by Urology. Maintain per Urology. Monitor Is/Os  Na goal 135-145; repeat BMP  Urology/nephrology following  Corrected Ca 9.5    ENDOCRINE: Diabetes mellitus  A1c- 5.7  ISS while NPO. Lantus DCed  Monitor fingersticks  AM cortisol    HEME/ONC: Pancytopenia likely chemo-induced vs sepsis, bone marrow suppression, R/O hemolysis, coagulopathy  DIC ruled out.   Give Vit K  Trend H/H and plt. Hb goal >7.0, plt goal >50  Plt antibody negative  DVT ppx: Will hold chemical DVT ppx in setting of low platelets/ bleed  B/L SCDs  Heme following    INFECTIOUS: S/P Klebsiella bacteremia. Now with recurrent GPR/GPC bacteremia  S/P CTX and flagyl until 8/5-DCed. On Vanc/Zosyn. Tobramycin x1.  ID following  Cultures: Blood GPC in pairs and chains, GPR. ID aware  Repeat blood cultures 8/9    MISC:  Family meeting scheduled for 8/10.   Given sudden deterioration and change on clinical status, contacted daughter Roxanne. Will be present asap.     SOCIAL/FAMILY:  [] awaiting [x] updated daughter and son-in- law at bedside [] family meeting    CODE STATUS:  [x] Full Code [] DNR [] DNI [] Palliative/Comfort Care    DISPOSITION:  [x] ICU [] Stroke Unit [] Floor [] EMU [] RCU [] PCU    Additional critical care time: 75 minutes

## 2023-08-08 NOTE — PROCEDURE NOTE - PROCEDURE DATE TIME, MLM
02-Aug-2023 13:36
08-Aug-2023 04:22
30-Jul-2023 12:30
03-Aug-2023 09:55
29-Jul-2023 11:55
29-Jul-2023 13:42
03-Aug-2023 14:14

## 2023-08-08 NOTE — PROCEDURE NOTE - GENERAL PROCEDURE DETAILS
Venipuncture performed to R arm with ultrasound guidance and 1 set of blood cultures collected.
ultrasound guidance used to locate artery, needle advanced into arterial lumen, blood sent for culture
Hands washed. PPE donned. Length of NG tube measured. Tip of NG tube lubricated. NG tube inserted into left nare and advanced to 60cm. NG tube secured using adhesive dressing. Confirmatory CXR ordered.

## 2023-08-08 NOTE — PROCEDURE NOTE - NSINDICATIONS_GEN_A_CORE
critical illness/hemodynamic monitoring/volume resuscitation
critical patient/monitoring purposes
critical illness
post-void residual/urinry obstruction or retention

## 2023-08-08 NOTE — PROGRESS NOTE ADULT - ASSESSMENT
ASSESSMENT:  67 YO male PMH T2DM, HLD, JAGDISH, tracheal stenosis s/p tracheostomy (s/p closure); glioblastoma s/p resection 1/27/2022, and IA Avastin treatment 3/2023 adm with aphasia, N/V and severe hypoNa (118), imaging showing disesase progression, Hospital course complicated by sepsis secondary to bacteremia, decompensated, intubated, started on antibiotics and upgrated to ICU on 7/29. S/p trach with ENT (8/4/23).    ALTERED MENTAL STATUS;BRAIN MASS    Abnormal electrocardiogram [ECG] [EKG]    Allergy, unspecified, initial encounter    Anemia, unspecified    Benign prostatic hyperplasia without lower urinary tract symptoms    Calculus of kidney    Cough, unspecified    Depression, unspecified    ENCOUNTER FOR ATTENT    Encounter for general adult medical examination without abnormal findings    Encounter for immunization    Encounter for immunization safety counseling    Encounter for other preprocedural examination    Encounter for screening for malignant neoplasm of prostate    Encounter for screening for other metabolic disorders    Encounter for screening for other viral diseases    ESSENTIAL (PRIMARY)    Gastro-esophageal reflux disease without esophagitis    Generalized anxiety disorder    History of Glioblastoma    Hyperlipidemia, unspecified    Hypo-osmolality and hyponatremia    Hypotension, unspecified    Iron deficiency    Iron deficiency anemia, unspecified    LONG TERM (CURRENT)    LONG TERM (CURRENT)    LONG TERM (CURRENT)    Malignant neoplasm of brain, unspecified    Nausea with vomiting, unspecified    Other constipation    Other fatigue    Other injury of unspecified body region, initial encounter    OTHER SPECIFIED DISE    Personal history of malignant neoplasm, unspecified    Personal history of other diseases of the nervous system and sense organs    Personal history of other endocrine, nutritional and metabolic disease    Personal history of other mental and behavioral disorders    Personal history of transient ischemic attack (TIA), and cerebral infarction without residual deficits    POSTPROCEDURAL SUBGL    Pruritus, unspecified    Shortness of breath    Tachycardia, unspecified    Thrombocytopenia, unspecified    Unspecified abdominal pain    Unspecified visual disturbance    Urinary calculus, unspecified    Vitamin D deficiency, unspecified    No pertinent family history in first degree relatives    FH: diabetes mellitus (Father, Mother)    FH: hyperlipidemia (Father)    FH: hypertension (Father, Mother)    Handoff    MEWS Score    No pertinent past medical history    Diabetes mellitus    Hypertension    Glioblastoma    Type 2 diabetes mellitus    Hyperlipidemia    Iron deficiency anemia    Nephrolithiasis    Thrombocytopenia    Hyponatremia    BPH (benign prostatic hyperplasia)    No pertinent past medical history    Ventilator dependence    Ventilator dependence    Altered mental status    Hyponatremia    Hypertension    Glioblastoma    Type 2 diabetes mellitus    Hyperlipidemia    Iron deficiency anemia    Thrombocytopenia    BPH (benign prostatic hyperplasia)    Hyponatremia    Encounter for palliative care    Advance care planning    Functional quadriplegia    At risk for altered mental status    Altered mental status    Debility    Moderate protein-calorie malnutrition    Severe protein-calorie malnutrition    Septic shock    Encephalopathy due to structural disorder of brain    Open tracheostomy    No significant past surgical history    No significant past surgical history    S/P craniotomy    H/O tracheostomy    No significant past surgical history    MED EVAL    Type 2 diabetes mellitus    BPH (benign prostatic hyperplasia)    90+    Room Service Assist    Room Service Assist    Brain mass    Hypertension    Glioblastoma    Type 2 diabetes mellitus    Hyperlipidemia    Iron deficiency anemia    Thrombocytopenia    BPH (benign prostatic hyperplasia)    Tracheal stenosis    Plan:  Neuro:  - neuro q4h/vitals q1hrs   - CTH 7/7: Interval development of a 13 mm hyperdense nodule along the inferior margin of the resection cavity which may represent progression of disease with hemorrhage. Repeat CTH stable, CTH 7/17 stable   - CTA 7/7: negative, CTA 7/17 stable  - CTP 7/17 stable.   - pain control prn, fentanyl IVP prn  - vimpat 50mg BID (switched from keppra 2/2 thrombocytopenia) for seizure tx  - Decadron 2mg BID  - c/w home Lexapro 5mg (home dose 5mg)  - home ASA 81 held i/s/o thrombocytopenia   - MRI brain 7/9 suspicious for recurrent tumor and hydro   - eeg dc'd 8/8, negative for seizures    Pulm:  - Trach (6 Shiley) to full vent support 40/400/14/8, CPAP 12/6 trials as tolerated  - duonebs/mucomyst/3% inhalation q6    Cardio:  - MAP goal >65, levo gtt prn, midodrine 15q8   - 4/17 outpatient echo EF 65%  - echo 7/31: EF 60-65%  - 8/8 QTc 503    GI:  - [place] sump to LIWS for ileus, NPO   - +RT 8/4 2/2 multiple loose stools, d/c'd 8/5  - protonix BID and carafate 1g q6h for upper GI bleed  - GI following, s/p EGD 8/1: severe circumferential esophagitis, small ulcer to anterior stomach  - reglan 10 IV q8h for abdominal distention     Renal/:  - Hyponatremic (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 as well as SSRI use), s/p tolvaptan 7/21; currently on NaCl tabs 3g q6, Florinef 0.2 BID, 3% @ 30cc/hr  - Campuzano replaced 8/3 by urology for difficult straight cath  - 3% @30    Endo:  - A1c 5.7  - ISS  - h/o T2DM: home Mandeepuvia held  - h/o HLD: c/w Atorvastatin 20mg    Heme:  - SCDs for DVT ppx, SQL and Aspirin 81 held for thrombocytopenia   - heme consulted for thrombocytopenia- w/u sent, likely chronic thrombocytopenia d/t chemotherapy agent in past   - Per heme transfuse 1U plt if bleeding and platelet count <50k, if febrile and platelet count <20k. Hold transfusing platelets until tomorrow before procedure.   - 1u PRBC, 1U PLT 7/29, 2u PRBC, 1u PLT 7/30, 1u PRBC 8/3, 2u PLT 8/4, 1u PRBC 8/4  - LE dopplers 7/8 negative  - LUE doppler 7/29: L superficial cephalic vein thrombophlebitis  - CTA PE protocol for tachycardia: negative     ID:  - Pancultured 7/28, bcx growing K. pneumoniae, sputum cx K. pneumoniae, f/u blood cx 7/30  - meropenem 1g q8hrs (7/28-7/31), changed to Ceftriaxone/Flagyl per ID recs (7/31-8/5)  - CT CAP 7/31: Wall thickening in the colon suggesting an infectious or inflammatory colitis, Suspected small duodenal ulcer with surrounding edema in, Distended gallbladder w/o cholecystitis. Mild ascites. Bibasilar pneumonia L>R.  - Febrile, leukocytosis and tachycardia with elevated lactic acid on 7/17  - vancomycin/meropenem empirically (7/17-19), d/c per ID and repeat blood cx 7/19, NGTD  - Started Vanc/Zosyn started empirically (8/8-)    Dispo:   - NSICU status, full code, AR    D/w Dr. Dorado and Dr. Lerma

## 2023-08-08 NOTE — PROVIDER CONTACT NOTE (OTHER) - RECOMMENDATIONS
As per PA.
MD at bedside
1 liter bolus given. austin and vaso drip started. austin stick given. propofol started for vent synchrony. nimbex pushes given for vent synchrony. nimbex drip started. fentanyl pushes given for pain.
MD assessing
alleyvn in place. plan to remove sutures tomororw 8/8 as per charlene HILL from ENT.
Would like RN to attempt placement of another line.
Continuous fluid or fluid bolus
Give morning dose, start new dose later.
Monitor. HR  not sustained, and coming down.
hold PO meds for now
PA to assess
Recheck BP in a few minutes.
b/l Wrist restraints. Would like to avoid any chemical restraints since mental status is already off.

## 2023-08-08 NOTE — PROCEDURE NOTE - NSSITEPREP_SKIN_A_CORE
povidone iodine (if allergic to chlorhexidine)
chlorhexidine

## 2023-08-08 NOTE — PROCEDURE NOTE - NSPROCDETAILS_GEN_ALL_CORE
guidewire recovered/lumen(s) aspirated and flushed/sterile dressing applied/sterile technique, catheter placed/ultrasound guidance with use of sterile gel and probe cove
location identified, draped/prepped, sterile technique used, needle inserted/introduced/positive blood return obtained via catheter/connected to a pressurized flush line/sutured in place/hemostasis with direct pressure, dressing applied/Seldinger technique/all materials/supplies accounted for at end of procedure
sterile technique, indwelling urinary device inserted
guidewire recovered/lumen(s) aspirated and flushed/sterile dressing applied/sterile technique, catheter placed/ultrasound guidance with use of sterile gel and probe cove

## 2023-08-08 NOTE — PROVIDER CONTACT NOTE (OTHER) - BACKGROUND
s/p trach 8/4/23. sutures in place with foam trach ties.
Neuro status changed for noon assessment, following up
Pt admitted for AMS, treated for hyponatremia, monitored for seizure activity, most recently have a Rapid response for another decline in mental stats. HR in the 20's.
Pt admitted with AMS.
see previous note
Pt admitted with AMS, RRT and stroke code on 7/17 Day shift for another change in mental status and elevated HR and BP.
Pt s/p RRT and stroke code today for change in mental status. Has been ST to the 130's prior to change of shift, showing improvement HR sustained at 116.
s/p upgrade for hyponatremia, trach w ENT on 8/4
Pt admitted with AMS and was a rapid yesterday in the day time and a stroke code for another change in mental status.
Pt had a RRT and stroke code called x2 days ago for tachycardia and hypertension, blood cultures drawn at that time.
See previous note

## 2023-08-08 NOTE — PROCEDURE NOTE - NSPROCNAME_GEN_A_CORE
General
Point of Care Ultrasound Vascular Access
General
Point of Care Ultrasound Lung
General
Central Line Insertion
Central Line Insertion
General
Arterial Puncture/Cannulation
Urinary Device Placement

## 2023-08-08 NOTE — PROGRESS NOTE ADULT - SUBJECTIVE AND OBJECTIVE BOX
=================================  NEUROCRITICAL CARE ATTENDING NOTE  =================================    JORDAN CHAKRABORTY   MRN-[id number]  Summary:  68y/M  with type 2 diabetes, hyperlipidemia, iron deficiency anemia, tracheal stenosis s/p tracheostomy (which was closed by ENT 7 days ago); glioblastoma s/p resection 1/27/2022, and Avastin treatment 3/2023 presenting with altered mental status and weakness x2 days. Per family, they last saw him last night around 6pm when he was at baseline. Patient has intermittent expressive aphasia but is getting worse and now is persistent, and has intermittent nausea and vomiting since last night. Patient was supposed to receive an outpatient brain MRI on 7/25/2023. Stroke code performed in  is negative for CVA. Head CT shows new hyperdensity in frontal parietal - surgical site. MRI is ordered to rule out tumor recurrence.  Per daughter at bedside, Entresto and Eliquis were stopped per his PC - cardiologist.   (07 Jul 2023 13:10)    COURSE IN THE HOSPITAL:  07/07 Admitted to [facility], started on 3%  07/08 37.8 pancultured No significant events overnight.   07/09 No significant events overnight.    07/29 back to ICU for septic shock - Klebsiella  08/07 back on pressors overnight, CT overnight, 1/2 amp glucose given  08/08 Tmax 38.3    Past Medical History: No pertinent past medical history diabetes mellitus Hypertension  Glioblastoma  Type 2 diabetes mellitus  Hyperlipidemia  Iron deficiency anemia  Nephrolithiasis  Thrombocytopenia  Hyponatremia  BPH (benign prostatic hyperplasia)  Allergies:  amoxicillin (Rash)  Home meds:   ·	aspirin 81 mg oral tablet: 1 tab(s) orally once a day  ·	atorvastatin 20 mg oral tablet: 1 tab(s) orally once a day (at bedtime)  ·	escitalopram 5 mg oral tablet: 1 tab(s) orally once a day  ·	fluticasone 50 mcg/inh nasal spray: 1 spray(s) nasal 2 times a day  ·	Januvia 100 mg oral tablet: 1 tab(s) orally once a day  ·	levETIRAcetam 750 mg oral tablet: 1 tab(s) orally 2 times a day  ·	melatonin 3 mg oral tablet: 2 tab(s) orally once a day (at bedtime)  ·	ondansetron 4 mg oral tablet: 1 tab(s) orally once a day  ·	pantoprazole 40 mg oral delayed release tablet: 1 tab(s) orally once a day (before a meal)  ·	senna (sennosides) 8.6 mg oral tablet: 1 tab(s) orally once a day as needed for  constipation  ·	sodium chloride 1 g oral tablet: 2 tab(s) orally every 6 hours    PHYSICAL EXAMINATION  T(C): 38.3 (08-08 @ 05:49), Max: 38.3 (08-08 @ 05:49) HR: 101 (08-08 @ 06:24) (69 - 106) BP: 90/56 (08-08 @ 06:00) (90/56 - 126/63) RR: 24 (08-08 @ 06:24) (16 - 28) SpO2: 100% (08-08 @ 06:24) (99% - 100%)   NEUROLOGIC EXAMINATION:  Patient is  awake alert orientation x2, JUAN, no facial droop, moving all 4s with good strength  eyes open, not FC, tracks to L, JUAN, L UE trace withdrawal, R UE 0/5, BLE TF  GENERAL: 12 40 400 +5   EENT:  anicteric, trach site clean  CARDIOVASCULAR: (+) S1 S2, normal rate and regular rhythm  PULMONARY: clear to auscultation bilaterally  ABDOMEN: soft, nontender with normoactive bowel sounds  EXTREMITIES: no edema  SKIN: no rash    LABS: 08-08  CAPILLARY BLOOD GLUCOSE 95 118 105 99 90              (4.75)  9.2  (8.5)   3.71  )-----------( 69 (86)       ( 08 Aug 2023 04:25 )             27.3   (134)  128<L>  |  99  |  13  ----------------------------<  115<H>  3.6   |  21<L>  |  0.43<L>    Ca    7.7<L>      08 Aug 2023 04:25  Phos  2.3     08-08  Mg     1.6     08-08    TPro  x   /  Alb  x   /  TBili  1.4<H>  /  DBili  0.6<H>  /  AST  x   /  ALT  x   /  AlkPhos  x   08-08 08-07 @ 07:01 - 08-08 @ 07:00  IN: 1367.5 mL / OUT: 2205 mL / NET: -837.5 mL     08-06 @ 07:01 - 08-07 @ 07:00  IN: 2758.7 mL / OUT: 3225 mL / NET: -466.3 mL    Bacteriology:  08/03 Blood CS NG3D x2   07/31 sputum rare to few Klebsiella    CSF studies:  EEG:  Neuroimaging:  Other imaging:    MEDICATIONS: 08-08    ·	lacosamide IVPB 50 IV Intermittent every 12 hours  ·	midodrine 15 milliGRAM(s) Oral every 8 hours  ·	acetylcysteine 10%  Inhalation 4 Inhalation every 6 hours  ·	albuterol/ipratropium for Nebulization 3 Nebulizer every 6 hours  ·	sodium chloride 3%  Inhalation 4 Inhalation every 6 hours  ·	pantoprazole  Injectable 40 IV Push every 12 hours  ·	sucralfate suspension 1 Oral every 6 hours  ·	atorvastatin 20 Oral at bedtime  ·	dexAMETHasone  Injectable 2 IV Push every 12 hours  ·	fludroCORTISONE 0.2 Oral daily  ·	insulin glargine Injectable (LANTUS) 10 SubCutaneous at bedtime  ·	insulin lispro (ADMELOG) corrective regimen sliding scale  SubCutaneous every 6 hours  ·	lactobacillus acidophilus 1 Oral daily  ·	multivitamin 1 Oral daily  ·	petrolatum Ophthalmic Ointment 1 Both EYES three times a day  ·	sodium chloride 3 Oral every 6 hours  ·	sodium phosphate 15 milliMole(s)/250 mL IVPB 15 IV Intermittent once  ·	acetaminophen     Tablet .. 650 Oral every 6 hours PRN  ·	fentaNYL    Injectable 25 IV Push every 2 hours PRN    IV FLUIDS: IVL  DRIPS:  ·	norepinephrine Infusion 0.05 MICROgram(s)/kG/Min IV Continuous <Continuous> 0.1 --> 0.06  DIET: TF Vital @ 10cc/hr  Lines:  Drains:    Wounds:    CODE STATUS:  Full Code                       GOALS OF CARE:  aggressive                      DISPOSITION:  ICU =================================  NEUROCRITICAL CARE ATTENDING NOTE  =================================    JORDAN CHAKRABORTY   MRN-[id number]  Summary:  68y/M  with type 2 diabetes, hyperlipidemia, iron deficiency anemia, tracheal stenosis s/p tracheostomy (which was closed by ENT 7 days ago); glioblastoma s/p resection 1/27/2022, and Avastin treatment 3/2023 presenting with altered mental status and weakness x2 days. Per family, they last saw him last night around 6pm when he was at baseline. Patient has intermittent expressive aphasia but is getting worse and now is persistent, and has intermittent nausea and vomiting since last night. Patient was supposed to receive an outpatient brain MRI on 7/25/2023. Stroke code performed in  is negative for CVA. Head CT shows new hyperdensity in frontal parietal - surgical site. MRI is ordered to rule out tumor recurrence.  Per daughter at bedside, Entresto and Eliquis were stopped per his PC - cardiologist.   (07 Jul 2023 13:10)    COURSE IN THE HOSPITAL:  07/07 Admitted to [facility], started on 3%  07/08 37.8 pancultured No significant events overnight.   07/09 No significant events overnight.    07/29 back to ICU for septic shock - Klebsiella  08/07 back on pressors overnight, CT overnight, 1/2 amp glucose given  08/08 Tmax 38.3    Past Medical History: No pertinent past medical history diabetes mellitus Hypertension  Glioblastoma  Type 2 diabetes mellitus  Hyperlipidemia  Iron deficiency anemia  Nephrolithiasis  Thrombocytopenia  Hyponatremia  BPH (benign prostatic hyperplasia)  Allergies:  amoxicillin (Rash)  Home meds:   ·	aspirin 81 mg oral tablet: 1 tab(s) orally once a day  ·	atorvastatin 20 mg oral tablet: 1 tab(s) orally once a day (at bedtime)  ·	escitalopram 5 mg oral tablet: 1 tab(s) orally once a day  ·	fluticasone 50 mcg/inh nasal spray: 1 spray(s) nasal 2 times a day  ·	Januvia 100 mg oral tablet: 1 tab(s) orally once a day  ·	levETIRAcetam 750 mg oral tablet: 1 tab(s) orally 2 times a day  ·	melatonin 3 mg oral tablet: 2 tab(s) orally once a day (at bedtime)  ·	ondansetron 4 mg oral tablet: 1 tab(s) orally once a day  ·	pantoprazole 40 mg oral delayed release tablet: 1 tab(s) orally once a day (before a meal)  ·	senna (sennosides) 8.6 mg oral tablet: 1 tab(s) orally once a day as needed for  constipation  ·	sodium chloride 1 g oral tablet: 2 tab(s) orally every 6 hours    PHYSICAL EXAMINATION  T(C): 38.3 (08-08 @ 05:49), Max: 38.3 (08-08 @ 05:49) HR: 101 (08-08 @ 06:24) (69 - 106) BP: 90/56 (08-08 @ 06:00) (90/56 - 126/63) RR: 24 (08-08 @ 06:24) (16 - 28) SpO2: 100% (08-08 @ 06:24) (99% - 100%)   NEUROLOGIC EXAMINATION:  Patient is  awake alert orientation x2, JUAN, no facial droop, moving all 4s with good strength  eyes open, not FC, tracks to L, JUAN, L UE trace withdrawal, R UE 0/5, BLE TF  GENERAL: 12 40 400 +5   EENT:  anicteric, trach site clean  CARDIOVASCULAR: (+) S1 S2, normal rate and regular rhythm  PULMONARY: clear to auscultation bilaterally  ABDOMEN: soft, nontender with normoactive bowel sounds  EXTREMITIES: no edema  SKIN: no rash    LABS: 08-08  CAPILLARY BLOOD GLUCOSE 95 118 105 99 90              (4.75)  9.2  (8.5)   3.71  )-----------( 69 (86)       ( 08 Aug 2023 04:25 )             27.3   (134)  128<L>  |  99  |  13  ----------------------------<  115<H>  3.6   |  21<L>  |  0.43<L>    Ca    7.7<L>      08 Aug 2023 04:25  Phos  2.3     08-08  Mg     1.6     08-08    TPro  x   /  Alb  x   /  TBili  1.4<H>  /  DBili  0.6<H>  /  AST  x   /  ALT  x   /  AlkPhos  x   08-08 08-07 @ 07:01  -  08-08 @ 07:00  IN: 1367.5 mL / OUT: 2205 mL / NET: -837.5 mL    Bacteriology:  08/03 Blood CS NG3D x2   07/31 sputum rare to few Klebsiella    CSF studies:  EEG:  Neuroimaging:  Other imaging:    MEDICATIONS: 08-08    ·	lacosamide IVPB 50 IV Intermittent every 12 hours  ·	midodrine 15 milliGRAM(s) Oral every 8 hours  ·	acetylcysteine 10%  Inhalation 4 Inhalation every 6 hours  ·	albuterol/ipratropium for Nebulization 3 Nebulizer every 6 hours  ·	sodium chloride 3%  Inhalation 4 Inhalation every 6 hours  ·	pantoprazole  Injectable 40 IV Push every 12 hours  ·	sucralfate suspension 1 Oral every 6 hours  ·	atorvastatin 20 Oral at bedtime  ·	dexAMETHasone  Injectable 2 IV Push every 12 hours  ·	fludroCORTISONE 0.2 Oral daily  ·	insulin glargine Injectable (LANTUS) 10 SubCutaneous at bedtime  ·	insulin lispro (ADMELOG) corrective regimen sliding scale  SubCutaneous every 6 hours  ·	lactobacillus acidophilus 1 Oral daily  ·	multivitamin 1 Oral daily  ·	petrolatum Ophthalmic Ointment 1 Both EYES three times a day  ·	sodium chloride 3 Oral every 6 hours  ·	sodium phosphate 15 milliMole(s)/250 mL IVPB 15 IV Intermittent once  ·	acetaminophen     Tablet .. 650 Oral every 6 hours PRN  ·	fentaNYL    Injectable 25 IV Push every 2 hours PRN    IV FLUIDS: NS@30cc/hr  DRIPS:  ·	norepinephrine Infusion 0.05 MICROgram(s)/kG/Min IV Continuous <Continuous> 0.04  DIET: TF Vital @40  Lines:  Drains:    Wounds:    CODE STATUS:  Full Code                       GOALS OF CARE:  aggressive                      DISPOSITION:  ICU =================================  NEUROCRITICAL CARE ATTENDING NOTE  =================================    JORDAN CHAKRABORTY   MRN-[id number]  Summary:  68y/M  with type 2 diabetes, hyperlipidemia, iron deficiency anemia, tracheal stenosis s/p tracheostomy (which was closed by ENT 7 days ago); glioblastoma s/p resection 1/27/2022, and Avastin treatment 3/2023 presenting with altered mental status and weakness x2 days. Per family, they last saw him last night around 6pm when he was at baseline. Patient has intermittent expressive aphasia but is getting worse and now is persistent, and has intermittent nausea and vomiting since last night. Patient was supposed to receive an outpatient brain MRI on 7/25/2023. Stroke code performed in  is negative for CVA. Head CT shows new hyperdensity in frontal parietal - surgical site. MRI is ordered to rule out tumor recurrence.  Per daughter at bedside, Entresto and Eliquis were stopped per his PC - cardiologist.   (07 Jul 2023 13:10)    COURSE IN THE HOSPITAL:  07/07 Admitted to [facility], started on 3%  07/08 37.8 pancultured No significant events overnight.   07/09 No significant events overnight.    07/29 back to ICU for septic shock - Klebsiella  08/07 back on pressors overnight, CT overnight, 1/2 amp glucose given  08/08 Tmax 38.3    Past Medical History: No pertinent past medical history diabetes mellitus Hypertension  Glioblastoma  Type 2 diabetes mellitus  Hyperlipidemia  Iron deficiency anemia  Nephrolithiasis  Thrombocytopenia  Hyponatremia  BPH (benign prostatic hyperplasia)  Allergies:  amoxicillin (Rash)  Home meds:   ·	aspirin 81 mg oral tablet: 1 tab(s) orally once a day  ·	atorvastatin 20 mg oral tablet: 1 tab(s) orally once a day (at bedtime)  ·	escitalopram 5 mg oral tablet: 1 tab(s) orally once a day  ·	fluticasone 50 mcg/inh nasal spray: 1 spray(s) nasal 2 times a day  ·	Januvia 100 mg oral tablet: 1 tab(s) orally once a day  ·	levETIRAcetam 750 mg oral tablet: 1 tab(s) orally 2 times a day  ·	melatonin 3 mg oral tablet: 2 tab(s) orally once a day (at bedtime)  ·	ondansetron 4 mg oral tablet: 1 tab(s) orally once a day  ·	pantoprazole 40 mg oral delayed release tablet: 1 tab(s) orally once a day (before a meal)  ·	senna (sennosides) 8.6 mg oral tablet: 1 tab(s) orally once a day as needed for  constipation  ·	sodium chloride 1 g oral tablet: 2 tab(s) orally every 6 hours    PHYSICAL EXAMINATION  T(C): 38.3 (08-08 @ 05:49), Max: 38.3 (08-08 @ 05:49) HR: 101 (08-08 @ 06:24) (69 - 106) BP: 90/56 (08-08 @ 06:00) (90/56 - 126/63) RR: 24 (08-08 @ 06:24) (16 - 28) SpO2: 100% (08-08 @ 06:24) (99% - 100%)   NEUROLOGIC EXAMINATION:  Patient eyes open, not FC, tracks to L, does not cross midline to R, JUAN, L UE trace withdrawal, R UE 0/5, BLE TF  GENERAL: 12 40 400 +5   EENT:  anicteric, trach site clean  CARDIOVASCULAR: (+) S1 S2, normal rate and regular rhythm  PULMONARY: clear to auscultation bilaterally  ABDOMEN: soft, nontender with normoactive bowel sounds  EXTREMITIES: no edema  SKIN: no rash    LABS: 08-08  CAPILLARY BLOOD GLUCOSE 95 118 105 99 90              (4.75)  9.2  (8.5)   3.71  )-----------( 69 (86)       ( 08 Aug 2023 04:25 )             27.3   (134)  128<L>  |  99  |  13  ----------------------------<  115<H>  3.6   |  21<L>  |  0.43<L>    Ca    7.7<L>      08 Aug 2023 04:25  Phos  2.3     08-08  Mg     1.6     08-08    TPro  x   /  Alb  x   /  TBili  1.4<H>  /  DBili  0.6<H>  /  AST  x   /  ALT  x   /  AlkPhos  x   08-08 08-07 @ 07:01  -  08-08 @ 07:00  IN: 1367.5 mL / OUT: 2205 mL / NET: -837.5 mL    Bacteriology:  08/03 Blood CS NG3D x2   07/31 sputum rare to few Klebsiella    CSF studies:  EEG:  Neuroimaging:  Other imaging:    MEDICATIONS: 08-08    ·	lacosamide IVPB 50 IV Intermittent every 12 hours  ·	midodrine 15 milliGRAM(s) Oral every 8 hours  ·	acetylcysteine 10%  Inhalation 4 Inhalation every 6 hours  ·	albuterol/ipratropium for Nebulization 3 Nebulizer every 6 hours  ·	sodium chloride 3%  Inhalation 4 Inhalation every 6 hours  ·	pantoprazole  Injectable 40 IV Push every 12 hours  ·	sucralfate suspension 1 Oral every 6 hours  ·	atorvastatin 20 Oral at bedtime  ·	dexAMETHasone  Injectable 2 IV Push every 12 hours  ·	fludroCORTISONE 0.2 Oral daily  ·	insulin glargine Injectable (LANTUS) 10 SubCutaneous at bedtime  ·	insulin lispro (ADMELOG) corrective regimen sliding scale  SubCutaneous every 6 hours  ·	lactobacillus acidophilus 1 Oral daily  ·	multivitamin 1 Oral daily  ·	petrolatum Ophthalmic Ointment 1 Both EYES three times a day  ·	sodium chloride 3 Oral every 6 hours  ·	sodium phosphate 15 milliMole(s)/250 mL IVPB 15 IV Intermittent once  ·	acetaminophen     Tablet .. 650 Oral every 6 hours PRN  ·	fentaNYL    Injectable 25 IV Push every 2 hours PRN    IV FLUIDS: NS@30cc/hr  DRIPS:  ·	norepinephrine Infusion 0.05 MICROgram(s)/kG/Min IV Continuous <Continuous> 0.04  DIET: TF Vital @40  Lines:  Drains:    Wounds:    CODE STATUS:  Full Code                       GOALS OF CARE:  aggressive                      DISPOSITION:  ICU

## 2023-08-09 NOTE — GOALS OF CARE CONVERSATION - ADVANCED CARE PLANNING - CONVERSATION DETAILS
Please refer to previous GOC discussions: 7/18, 7/21, 8/1 and 8/2.    Met with pts daughter/HCP and son in law for ongoing support. Reviewed interval events: overnight pt w acute decompensation became hypotensive, hypoxic, increased pressor requirements in setting of suspected aspiration event/pna. CXR with ARDS.     Family understands from their discussion overnight with primary Nsg team that pts prognosis is poor and that he "is at end of life." Discussed disease trajectory and readdressed code status in light of overall prognosis and pts previously expressed wishes to die peacefully/ comfortably with hospice support. Daughter adamant about continuing full code/chest compressions understanding risks and likelihood of poor outcomes. More family to be at bedside tomorrow for further discussion and to discuss possibility of transporting pt via med flight to [place]. Oliveira is anxious that other family members will attempt to override her and Addie's health care decisions. Reassured her that pt's HCP form is on file and that they remain pts decision makers.  No changes in GOC or treatment preferences.      In addition to the E/M visit, an advance care planning meeting was performed. Start time: 1230; End time:100 ; Total time: 30min. A face to face meeting to discuss advance care planning was held today regarding: Osiel Norwood    Primary decision maker:  Patient is unable to participate in decision making;  Alternate/surrogate: Addie and Roxanne Norwood    . Discussed advance directives including, but not limited to, healthcare proxy and code status, as well as disease trajectory, patient's values/goals, and health care options that are available for end of life care. Decision regarding code status:  FULL CODE; Documentation completed today: GOC note

## 2023-08-09 NOTE — PROGRESS NOTE ADULT - SUBJECTIVE AND OBJECTIVE BOX
INFECTIOUS DISEASES CONSULT FOLLOW-UP NOTE    INTERVAL HPI/OVERNIGHT EVENTS:      ROS:   Constitutional, eyes, ENT, cardiovascular, respiratory, gastrointestinal, genitourinary, integumentary, neurological, psychiatric and heme/lymph are otherwise negative other than noted above       ANTIBIOTICS/RELEVANT:    MEDICATIONS  (STANDING):  acetylcysteine 10%  Inhalation 4 milliLiter(s) Inhalation every 6 hours  albuterol/ipratropium for Nebulization 3 milliLiter(s) Nebulizer every 6 hours  atorvastatin 20 milliGRAM(s) Oral at bedtime  chlorhexidine 0.12% Liquid 15 milliLiter(s) Oral Mucosa every 12 hours  chlorhexidine 2% Cloths 1 Application(s) Topical <User Schedule>  cisatracurium Infusion 3 MICROgram(s)/kG/Min (13.1 mL/Hr) IV Continuous <Continuous>  dexAMETHasone  Injectable 2 milliGRAM(s) IV Push every 12 hours  fentaNYL   Infusion. 0.5 MICROgram(s)/kG/Hr (3.63 mL/Hr) IV Continuous <Continuous>  fludroCORTISONE 0.2 milliGRAM(s) Oral daily  insulin lispro (ADMELOG) corrective regimen sliding scale   SubCutaneous every 6 hours  lacosamide IVPB 50 milliGRAM(s) IV Intermittent every 12 hours  lactobacillus acidophilus 1 Tablet(s) Oral daily  metoclopramide Injectable 10 milliGRAM(s) IV Push every 8 hours  multivitamin 1 Tablet(s) Oral daily  norepinephrine Infusion 0.05 MICROgram(s)/kG/Min (6.81 mL/Hr) IV Continuous <Continuous>  pantoprazole  Injectable 40 milliGRAM(s) IV Push every 12 hours  petrolatum Ophthalmic Ointment 1 Application(s) Both EYES three times a day  phytonadione  IVPB 10 milliGRAM(s) IV Intermittent daily  propofol Infusion 50 MICROgram(s)/kG/Min (21.8 mL/Hr) IV Continuous <Continuous>  simethicone 80 milliGRAM(s) Chew every 6 hours  sodium chloride 3 Gram(s) Oral every 6 hours  sodium chloride 3%  Inhalation 4 milliLiter(s) Inhalation every 6 hours  sodium chloride 3%. 500 milliLiter(s) (30 mL/Hr) IV Continuous <Continuous>  sucralfate suspension 1 Gram(s) Oral every 6 hours  vasopressin Infusion 0.04 Unit(s)/Min (6 mL/Hr) IV Continuous <Continuous>    MEDICATIONS  (PRN):  acetaminophen     Tablet .. 650 milliGRAM(s) Oral every 6 hours PRN Temp greater or equal to 38C (100.4F), Mild Pain (1 - 3)  sodium chloride 0.9% lock flush 10 milliLiter(s) IV Push every 1 hour PRN Pre/post blood products, medications, blood draw, and to maintain line patency        Vital Signs Last 24 Hrs  T(C): 37.6 (09 Aug 2023 09:04), Max: 37.7 (08 Aug 2023 21:44)  T(F): 99.7 (09 Aug 2023 09:04), Max: 99.8 (08 Aug 2023 21:44)  HR: 103 (09 Aug 2023 11:00) (91 - 118)  BP: 116/57 (09 Aug 2023 08:00) (80/52 - 157/69)  BP(mean): 82 (09 Aug 2023 08:00) (62 - 101)  RR: 24 (09 Aug 2023 11:00) (16 - 26)  SpO2: 97% (09 Aug 2023 11:00) (89% - 100%)    Parameters below as of 09 Aug 2023 11:00  Patient On (Oxygen Delivery Method): ventilator    O2 Concentration (%): 40    08-08-23 @ 07:01  -  08-09-23 @ 07:00  --------------------------------------------------------  IN: 6070.6 mL / OUT: 3440 mL / NET: 2630.6 mL    08-09-23 @ 07:01  -  08-09-23 @ 11:40  --------------------------------------------------------  IN: 1186.6 mL / OUT: 450 mL / NET: 736.6 mL      PHYSICAL EXAM:  Constitutional: alert, NAD  Eyes: the sclera and conjunctiva were normal.   ENT: the ears and nose were normal in appearance.   Neck: the appearance of the neck was normal and the neck was supple.   Pulmonary: no respiratory distress and lungs were clear to auscultation bilaterally.   Heart: heart rate was normal and rhythm regular, normal S1 and S2  Vascular:. there was no peripheral edema  Abdomen: normal bowel sounds, soft, non-tender  Neurological: no focal deficits.   Psychiatric: the affect was normal        LABS:                        9.2    3.56  )-----------( 64       ( 09 Aug 2023 08:12 )             27.9     08-09    134<L>  |  109<H>  |  12  ----------------------------<  139<H>  4.4   |  18<L>  |  0.46<L>    Ca    6.5<LL>      09 Aug 2023 08:12  Phos  3.2     08-09  Mg     1.9     08-09    TPro  4.0<L>  /  Alb  1.8<L>  /  TBili  2.2<H>  /  DBili  x   /  AST  22  /  ALT  15  /  AlkPhos  69  08-08    PT/INR - ( 09 Aug 2023 02:38 )   PT: 20.7 sec;   INR: 1.85          PTT - ( 09 Aug 2023 02:38 )  PTT:38.8 sec  Urinalysis Basic - ( 09 Aug 2023 08:12 )    Color: x / Appearance: x / SG: x / pH: x  Gluc: 139 mg/dL / Ketone: x  / Bili: x / Urobili: x   Blood: x / Protein: x / Nitrite: x   Leuk Esterase: x / RBC: x / WBC x   Sq Epi: x / Non Sq Epi: x / Bacteria: x        MICROBIOLOGY:      RADIOLOGY & ADDITIONAL STUDIES:  Reviewed INFECTIOUS DISEASES CONSULT FOLLOW-UP NOTE    INTERVAL HPI/OVERNIGHT EVENTS:    Last seen by ID 8/4- treatment for Kleb pneumo bacteremia with recs to complete CTX and Flagyl through 8/5  S/p trach 8/4 and central line was discontinued 8/5. Patient had high residuals on TF and AXR showing signs of ileus.   Patient developed fever 101.1 (r) on 8/8. Also began to overbreathe/belly breathing on vent- was switched back to full vent. Patient decompensated- hypotensive and hypoxic. Central line was inserted 8/8. CXR showing ARDS. Blood cultures growing PsA, E. faecalis, Clostridium perfringens. He received tobramycin x 1 last night and is on empiric vanc/zosyn.       ROS:   Constitutional, eyes, ENT, cardiovascular, respiratory, gastrointestinal, genitourinary, integumentary, neurological, psychiatric and heme/lymph are otherwise negative other than noted above       ANTIBIOTICS/RELEVANT:    MEDICATIONS  (STANDING):  acetylcysteine 10%  Inhalation 4 milliLiter(s) Inhalation every 6 hours  albuterol/ipratropium for Nebulization 3 milliLiter(s) Nebulizer every 6 hours  atorvastatin 20 milliGRAM(s) Oral at bedtime  chlorhexidine 0.12% Liquid 15 milliLiter(s) Oral Mucosa every 12 hours  chlorhexidine 2% Cloths 1 Application(s) Topical <User Schedule>  cisatracurium Infusion 3 MICROgram(s)/kG/Min (13.1 mL/Hr) IV Continuous <Continuous>  dexAMETHasone  Injectable 2 milliGRAM(s) IV Push every 12 hours  fentaNYL   Infusion. 0.5 MICROgram(s)/kG/Hr (3.63 mL/Hr) IV Continuous <Continuous>  fludroCORTISONE 0.2 milliGRAM(s) Oral daily  insulin lispro (ADMELOG) corrective regimen sliding scale   SubCutaneous every 6 hours  lacosamide IVPB 50 milliGRAM(s) IV Intermittent every 12 hours  lactobacillus acidophilus 1 Tablet(s) Oral daily  metoclopramide Injectable 10 milliGRAM(s) IV Push every 8 hours  multivitamin 1 Tablet(s) Oral daily  norepinephrine Infusion 0.05 MICROgram(s)/kG/Min (6.81 mL/Hr) IV Continuous <Continuous>  pantoprazole  Injectable 40 milliGRAM(s) IV Push every 12 hours  petrolatum Ophthalmic Ointment 1 Application(s) Both EYES three times a day  phytonadione  IVPB 10 milliGRAM(s) IV Intermittent daily  propofol Infusion 50 MICROgram(s)/kG/Min (21.8 mL/Hr) IV Continuous <Continuous>  simethicone 80 milliGRAM(s) Chew every 6 hours  sodium chloride 3 Gram(s) Oral every 6 hours  sodium chloride 3%  Inhalation 4 milliLiter(s) Inhalation every 6 hours  sodium chloride 3%. 500 milliLiter(s) (30 mL/Hr) IV Continuous <Continuous>  sucralfate suspension 1 Gram(s) Oral every 6 hours  vasopressin Infusion 0.04 Unit(s)/Min (6 mL/Hr) IV Continuous <Continuous>    MEDICATIONS  (PRN):  acetaminophen     Tablet .. 650 milliGRAM(s) Oral every 6 hours PRN Temp greater or equal to 38C (100.4F), Mild Pain (1 - 3)  sodium chloride 0.9% lock flush 10 milliLiter(s) IV Push every 1 hour PRN Pre/post blood products, medications, blood draw, and to maintain line patency        Vital Signs Last 24 Hrs  T(C): 37.6 (09 Aug 2023 09:04), Max: 37.7 (08 Aug 2023 21:44)  T(F): 99.7 (09 Aug 2023 09:04), Max: 99.8 (08 Aug 2023 21:44)  HR: 103 (09 Aug 2023 11:00) (91 - 118)  BP: 116/57 (09 Aug 2023 08:00) (80/52 - 157/69)  BP(mean): 82 (09 Aug 2023 08:00) (62 - 101)  RR: 24 (09 Aug 2023 11:00) (16 - 26)  SpO2: 97% (09 Aug 2023 11:00) (89% - 100%)    Parameters below as of 09 Aug 2023 11:00  Patient On (Oxygen Delivery Method): ventilator    O2 Concentration (%): 40    08-08-23 @ 07:01  -  08-09-23 @ 07:00  --------------------------------------------------------  IN: 6070.6 mL / OUT: 3440 mL / NET: 2630.6 mL    08-09-23 @ 07:01  -  08-09-23 @ 11:40  --------------------------------------------------------  IN: 1186.6 mL / OUT: 450 mL / NET: 736.6 mL      PHYSICAL EXAM:  Constitutional: NAD, sedated, paralyzed   Eyes: the sclera and conjunctiva were normal.   ENT: the ears and nose were normal in appearance.   Neck: the appearance of the neck was normal and the neck was supple. trach site clean  Pulmonary: no respiratory distress and lungs were clear to auscultation bilaterally.   Heart: heart rate was normal and rhythm regular, normal S1 and S2  Vascular:. there was no peripheral edema  Abdomen: normal bowel sounds, soft, non-tender  Neurological: no focal deficits.   Psychiatric: the affect was normal      LABS:                        9.2    3.56  )-----------( 64       ( 09 Aug 2023 08:12 )             27.9     08-09    134<L>  |  109<H>  |  12  ----------------------------<  139<H>  4.4   |  18<L>  |  0.46<L>    Ca    6.5<LL>      09 Aug 2023 08:12  Phos  3.2     08-09  Mg     1.9     08-09    TPro  4.0<L>  /  Alb  1.8<L>  /  TBili  2.2<H>  /  DBili  x   /  AST  22  /  ALT  15  /  AlkPhos  69  08-08    PT/INR - ( 09 Aug 2023 02:38 )   PT: 20.7 sec;   INR: 1.85          PTT - ( 09 Aug 2023 02:38 )  PTT:38.8 sec  Urinalysis Basic - ( 09 Aug 2023 08:12 )    Color: x / Appearance: x / SG: x / pH: x  Gluc: 139 mg/dL / Ketone: x  / Bili: x / Urobili: x   Blood: x / Protein: x / Nitrite: x   Leuk Esterase: x / RBC: x / WBC x   Sq Epi: x / Non Sq Epi: x / Bacteria: x        MICROBIOLOGY:  reviewed    RADIOLOGY & ADDITIONAL STUDIES:  Reviewed

## 2023-08-09 NOTE — CHART NOTE - NSCHARTNOTEFT_GEN_A_CORE
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 stage 2 PI - sacrum; Vicente score: 10; 2+ edema b/l arms      Labs:   08-09    134<L>  |  109<H>  |  12  ----------------------------<  139<H>  4.4   |  18<L>  |  0.46<L>    Ca    6.5<LL>      09 Aug 2023 08:12  Phos  3.2     08-09  Mg     1.9     08-09    TPro  4.0<L>  /  Alb  1.8<L>  /  TBili  2.2<H>  /  DBili  x   /  AST  22  /  ALT  15  /  AlkPhos  69  08-08    CAPILLARY BLOOD GLUCOSE      POCT Blood Glucose.: 121 mg/dL (09 Aug 2023 11:34)  POCT Blood Glucose.: 93 mg/dL (09 Aug 2023 07:02)  POCT Blood Glucose.: 87 mg/dL (08 Aug 2023 23:58)  POCT Blood Glucose.: 107 mg/dL (08 Aug 2023 16:25)    Medications:  MEDICATIONS  (STANDING):  acetylcysteine 10%  Inhalation 4 milliLiter(s) Inhalation every 6 hours  albuterol/ipratropium for Nebulization 3 milliLiter(s) Nebulizer every 6 hours  atorvastatin 20 milliGRAM(s) Oral at bedtime  chlorhexidine 0.12% Liquid 15 milliLiter(s) Oral Mucosa every 12 hours  chlorhexidine 2% Cloths 1 Application(s) Topical <User Schedule>  cisatracurium Infusion 3 MICROgram(s)/kG/Min (13.1 mL/Hr) IV Continuous <Continuous>  dexAMETHasone  Injectable 2 milliGRAM(s) IV Push every 12 hours  fentaNYL   Infusion. 0.5 MICROgram(s)/kG/Hr (3.63 mL/Hr) IV Continuous <Continuous>  fludroCORTISONE 0.2 milliGRAM(s) Oral daily  insulin lispro (ADMELOG) corrective regimen sliding scale   SubCutaneous every 6 hours  lacosamide IVPB 50 milliGRAM(s) IV Intermittent every 12 hours  lactobacillus acidophilus 1 Tablet(s) Oral daily  metoclopramide Injectable 10 milliGRAM(s) IV Push every 8 hours  multivitamin 1 Tablet(s) Oral daily  norepinephrine Infusion 0.05 MICROgram(s)/kG/Min (3.4 mL/Hr) IV Continuous <Continuous>  pantoprazole  Injectable 40 milliGRAM(s) IV Push every 12 hours  petrolatum Ophthalmic Ointment 1 Application(s) Both EYES three times a day  phytonadione  IVPB 10 milliGRAM(s) IV Intermittent daily  piperacillin/tazobactam IVPB.. 4.5 Gram(s) IV Intermittent every 8 hours  propofol Infusion 50 MICROgram(s)/kG/Min (21.8 mL/Hr) IV Continuous <Continuous>  simethicone 80 milliGRAM(s) Chew every 6 hours  sodium chloride 3 Gram(s) Oral every 6 hours  sodium chloride 3%  Inhalation 4 milliLiter(s) Inhalation every 6 hours  sodium chloride 3%. 500 milliLiter(s) (30 mL/Hr) IV Continuous <Continuous>  sucralfate suspension 1 Gram(s) Oral every 6 hours  vasopressin Infusion 0.04 Unit(s)/Min (6 mL/Hr) IV Continuous <Continuous>    MEDICATIONS  (PRN):  acetaminophen     Tablet .. 650 milliGRAM(s) Oral every 6 hours PRN Temp greater or equal to 38C (100.4F), Mild Pain (1 - 3)  sodium chloride 0.9% lock flush 10 milliLiter(s) IV Push every 1 hour PRN Pre/post blood products, medications, blood draw, and to maintain line patency      Weight: 72.6kg [4 Aug 2023]  Daily 72.7kg admit wt  Daily Height in cm: 170.18 (04 Aug 2023 11:52)      Weight Change: wt stable, anticipated wt fluctuations due to fluid shifts & improvement in edema. Recommend weekly weights for trending    IBW: 67.3kg   % IBW: 108%    Estimated energy needs:   Current body wt used for energy calculations as pt falls within % IBW. Needs estimated for age and adjusted for current clinical status, malnutrition    Calories: 3324-0681 kcals based on 25-30 kcals/kg  Protein: 87-109g based on 1.2-1.5g protein/kg  *Fluid needs per team    Subjective:   69 YO male PMH T2DM, HLD, JAGDISH, tracheal stenosis s/p tracheostomy (s/p closure); glioblastoma s/p resection 1/27/2022, and [place] Avastin treatment 3/2023 adm with aphasia, N/V and severe hypoNa (118), imaging showing disesase progression, Hospital course complicated by sepsis secondary to bacteremia, decompensated, intubated, started on antibiotics and upgrated to ICU on 7/29. S/p trach with ENT (8/4/23). Now with recurrent septic shock and bacteremia, hypoxic respiratory failure, ARDS. High residuals, diarrhea noted 8/5, reglan, metamucil started, now held due to reoccurring intolerance, increased pressor support. [place] sump placed for GI decompression and emesis, abd XR showing possible ileus vs SBO, noted upper GI bleed.    Pt now on full vent support with noted tachypnea, sepsis workup. Increased pressor requirements. Intubated/sedated on AC/CMV vent, norepi, vaso, propofol, fentanyl drips. NPO, NS running at 30ml/hr. Recommend to restart nutrition (trickle feeds) as soon as medically feasible or consider parenteral nutrition due to prolonged inadequate nutrition, intermittent holding of feeds. RDN will continue to monitor, reassess, and intervene as appropriate.       Previous Nutrition Diagnosis: Moderate malnutrition r/t chronic disease AEB <75% nutrition needs >1 month, mild muscle wasting    Active [ X  ]  Resolved [   ]    If resolved, new PES:     Goal:  Pt will meet at least 75% of protein & energy needs via most appropriate route for nutrition     Recommendations:  1. Resume nutrition as soon as medically feasible  - recommend Vital 1.0 @20ml/hr x first 24hrs with close monitoring for s/s GI distress, close monitoring of lytes & repletion prn  - goal: Vital 1.0 @75ml/hr x 24hrs; provides 1800ml total volume, 1800kcals, 72g protein, 1501.2ml free water daily  - assess propofol rate for final goal rate  - additional 1 LPS daily; provides 100kcals, 15g protein  - EN + Modular to provide 26.1 kcals/kg & 1.2g protein/kg  - consider post pyloric TF placement for increased tolerance  2. If GI tract not viable, recommend initiation of TPN: recommend 280g Dex + 90g AA + 50g SMOF lipids; provides 1812 kcals, 90g protein, GIR 2.67 mg/kg/min  3. Vitamins/minerals & fluids per team  - consider IV thiamine  4. Monitor chemistry, GI function, skin integrity  5. To follow clinical course & adjust recommendations prn    Risk Level: High [  X ] Moderate [   ] Low [   ]. 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 stage 2 PI - sacrum; Vicente score: 10; 2+ edema b/l arms      Labs:   08-09    134<L>  |  109<H>  |  12  ----------------------------<  139<H>  4.4   |  18<L>  |  0.46<L>    Ca    6.5<LL>      09 Aug 2023 08:12  Phos  3.2     08-09  Mg     1.9     08-09    TPro  4.0<L>  /  Alb  1.8<L>  /  TBili  2.2<H>  /  DBili  x   /  AST  22  /  ALT  15  /  AlkPhos  69  08-08    CAPILLARY BLOOD GLUCOSE      POCT Blood Glucose.: 121 mg/dL (09 Aug 2023 11:34)  POCT Blood Glucose.: 93 mg/dL (09 Aug 2023 07:02)  POCT Blood Glucose.: 87 mg/dL (08 Aug 2023 23:58)  POCT Blood Glucose.: 107 mg/dL (08 Aug 2023 16:25)    Medications:  MEDICATIONS  (STANDING):  acetylcysteine 10%  Inhalation 4 milliLiter(s) Inhalation every 6 hours  albuterol/ipratropium for Nebulization 3 milliLiter(s) Nebulizer every 6 hours  atorvastatin 20 milliGRAM(s) Oral at bedtime  chlorhexidine 0.12% Liquid 15 milliLiter(s) Oral Mucosa every 12 hours  chlorhexidine 2% Cloths 1 Application(s) Topical <User Schedule>  cisatracurium Infusion 3 MICROgram(s)/kG/Min (13.1 mL/Hr) IV Continuous <Continuous>  dexAMETHasone  Injectable 2 milliGRAM(s) IV Push every 12 hours  fentaNYL   Infusion. 0.5 MICROgram(s)/kG/Hr (3.63 mL/Hr) IV Continuous <Continuous>  fludroCORTISONE 0.2 milliGRAM(s) Oral daily  insulin lispro (ADMELOG) corrective regimen sliding scale   SubCutaneous every 6 hours  lacosamide IVPB 50 milliGRAM(s) IV Intermittent every 12 hours  lactobacillus acidophilus 1 Tablet(s) Oral daily  metoclopramide Injectable 10 milliGRAM(s) IV Push every 8 hours  multivitamin 1 Tablet(s) Oral daily  norepinephrine Infusion 0.05 MICROgram(s)/kG/Min (3.4 mL/Hr) IV Continuous <Continuous>  pantoprazole  Injectable 40 milliGRAM(s) IV Push every 12 hours  petrolatum Ophthalmic Ointment 1 Application(s) Both EYES three times a day  phytonadione  IVPB 10 milliGRAM(s) IV Intermittent daily  piperacillin/tazobactam IVPB.. 4.5 Gram(s) IV Intermittent every 8 hours  propofol Infusion 50 MICROgram(s)/kG/Min (21.8 mL/Hr) IV Continuous <Continuous>  simethicone 80 milliGRAM(s) Chew every 6 hours  sodium chloride 3 Gram(s) Oral every 6 hours  sodium chloride 3%  Inhalation 4 milliLiter(s) Inhalation every 6 hours  sodium chloride 3%. 500 milliLiter(s) (30 mL/Hr) IV Continuous <Continuous>  sucralfate suspension 1 Gram(s) Oral every 6 hours  vasopressin Infusion 0.04 Unit(s)/Min (6 mL/Hr) IV Continuous <Continuous>    MEDICATIONS  (PRN):  acetaminophen     Tablet .. 650 milliGRAM(s) Oral every 6 hours PRN Temp greater or equal to 38C (100.4F), Mild Pain (1 - 3)  sodium chloride 0.9% lock flush 10 milliLiter(s) IV Push every 1 hour PRN Pre/post blood products, medications, blood draw, and to maintain line patency      Weight: 72.6kg [4 Aug 2023]  Daily 72.7kg admit wt  Daily Height in cm: 170.18 (04 Aug 2023 11:52)      Weight Change: wt stable, anticipated wt fluctuations due to fluid shifts & improvement in edema. Recommend weekly weights for trending    IBW: 67.3kg   % IBW: 108%    Estimated energy needs:   Current body wt used for energy calculations as pt falls within % IBW. Needs estimated for age and adjusted for current clinical status, malnutrition    Calories: 8495-1615 kcals based on 25-30 kcals/kg  Protein: 87-109g based on 1.2-1.5g protein/kg  *Fluid needs per team    Subjective:   69 YO male PMH T2DM, HLD, JAGDISH, tracheal stenosis s/p tracheostomy (s/p closure); glioblastoma s/p resection 1/27/2022, and [place] Avastin treatment 3/2023 adm with aphasia, N/V and severe hypoNa (118), imaging showing disesase progression, Hospital course complicated by sepsis secondary to bacteremia, decompensated, intubated, started on antibiotics and upgrated to ICU on 7/29. S/p trach with ENT (8/4/23). Now with recurrent septic shock and bacteremia, hypoxic respiratory failure, ARDS. High residuals, diarrhea noted 8/5, reglan, metamucil started, now held due to reoccurring intolerance, increased pressor support. [place] sump placed for GI decompression and emesis, abd XR showing possible ileus vs SBO, noted upper GI bleed.    Pt now on full vent support with noted tachypnea, sepsis workup. Increased pressor requirements. Intubated/sedated on AC/CMV vent, norepi, vaso, propofol, fentanyl drips. NPO, NS running at 30ml/hr. Recommend to restart nutrition (trickle feeds) as soon as medically feasible or consider parenteral nutrition due to prolonged inadequate nutrition, intermittent holding of feeds. Noted goals of care discussion. RDN will continue to monitor, reassess, and intervene as appropriate.       Previous Nutrition Diagnosis: Moderate malnutrition r/t chronic disease AEB <75% nutrition needs >1 month, mild muscle wasting    Active [ X  ]  Resolved [   ]    If resolved, new PES:     Goal:  Pt will meet at least 75% of protein & energy needs via most appropriate route for nutrition     Recommendations:  1. Resume nutrition as soon as medically feasible -- Nutrition per GOC  - recommend Vital 1.0 @20ml/hr x first 24hrs with close monitoring for s/s GI distress, close monitoring of lytes & repletion prn  - goal: Vital 1.0 @75ml/hr x 24hrs; provides 1800ml total volume, 1800kcals, 72g protein, 1501.2ml free water daily  - assess propofol rate for final goal rate  - additional 1 LPS daily; provides 100kcals, 15g protein  - EN + Modular to provide 26.1 kcals/kg & 1.2g protein/kg  - consider post pyloric TF placement for increased tolerance  2. If GI tract not viable, recommend initiation of TPN: recommend 280g Dex + 90g AA + 50g SMOF lipids; provides 1812 kcals, 90g protein, GIR 2.67 mg/kg/min  3. Vitamins/minerals & fluids per team  - consider IV thiamine  4. Monitor chemistry, GI function, skin integrity  5. To follow clinical course & adjust recommendations prn    Risk Level: High [  X ] Moderate [   ] Low [   ].

## 2023-08-09 NOTE — PROGRESS NOTE ADULT - SUBJECTIVE AND OBJECTIVE BOX
=================================  NEUROCRITICAL CARE ATTENDING NOTE  =================================    JORDAN CHAKRABORTY   MRN-[id number]  Summary:  68y/M  with type 2 diabetes, hyperlipidemia, iron deficiency anemia, tracheal stenosis s/p tracheostomy (which was closed by ENT 7 days ago); glioblastoma s/p resection 1/27/2022, and Avastin treatment 3/2023 presenting with altered mental status and weakness x2 days. Per family, they last saw him last night around 6pm when he was at baseline. Patient has intermittent expressive aphasia but is getting worse and now is persistent, and has intermittent nausea and vomiting since last night. Patient was supposed to receive an outpatient brain MRI on 7/25/2023. Stroke code performed in  is negative for CVA. Head CT shows new hyperdensity in frontal parietal - surgical site. MRI is ordered to rule out tumor recurrence.  Per daughter at bedside, Entresto and Eliquis were stopped per his PC - cardiologist.   (07 Jul 2023 13:10)    COURSE IN THE HOSPITAL:  07/07 Admitted to [facility], started on 3%  07/08 37.8 pancultured No significant events overnight.   07/09 No significant events overnight.    07/29 back to ICU for septic shock - Klebsiella  08/07 back on pressors overnight, CT overnight, 1/2 amp glucose given  08/08 Tmax 38.3  08/09 Tmax 38.2    Past Medical History: No pertinent past medical history diabetes mellitus Hypertension  Glioblastoma  Type 2 diabetes mellitus  Hyperlipidemia  Iron deficiency anemia  Nephrolithiasis  Thrombocytopenia  Hyponatremia  BPH (benign prostatic hyperplasia)  Allergies:  amoxicillin (Rash)  Home meds:   ·	aspirin 81 mg oral tablet: 1 tab(s) orally once a day  ·	atorvastatin 20 mg oral tablet: 1 tab(s) orally once a day (at bedtime)  ·	escitalopram 5 mg oral tablet: 1 tab(s) orally once a day  ·	fluticasone 50 mcg/inh nasal spray: 1 spray(s) nasal 2 times a day  ·	Januvia 100 mg oral tablet: 1 tab(s) orally once a day  ·	levETIRAcetam 750 mg oral tablet: 1 tab(s) orally 2 times a day  ·	melatonin 3 mg oral tablet: 2 tab(s) orally once a day (at bedtime)  ·	ondansetron 4 mg oral tablet: 1 tab(s) orally once a day  ·	pantoprazole 40 mg oral delayed release tablet: 1 tab(s) orally once a day (before a meal)  ·	senna (sennosides) 8.6 mg oral tablet: 1 tab(s) orally once a day as needed for  constipation  ·	sodium chloride 1 g oral tablet: 2 tab(s) orally every 6 hours    PHYSICAL EXAMINATION  T(C): 37.7 (08-09 @ 05:44), Max: 38.2 (08-08 @ 08:00) HR: 101 (08-09 @ 07:00) (90 - 118) BP: 107/61 (08-09 @ 07:00) (80/52 - 157/69) RR: 22 (08-09 @ 07:00) (16 - 26) SpO2: 98% (08-09 @ 07:00) (89% - 100%)   NEUROLOGIC EXAMINATION:  Patient eyes open, not FC, tracks to L, does not cross midline to R, JUAN, L UE trace withdrawal, R UE 0/5, BLE TF  GENERAL: 12 40 400 +5   EENT:  anicteric, trach site clean  CARDIOVASCULAR: (+) S1 S2, normal rate and regular rhythm  PULMONARY: clear to auscultation bilaterally  ABDOMEN: soft, nontender with normoactive bowel sounds  EXTREMITIES: no edema  SKIN: no rash    LABS: 08-09  CAPILLARY BLOOD GLUCOSE 93 [phone]     (3.67)  9.4  (8.9)  3.11  )-----------( 44  (70)     ( 09 Aug 2023 02:38 )             28.4   (135 )  133<L>  |  108  |  12  ----------------------------<  169<H>  4.4   |  18<L>  |  0.48<L>    Ca    6.2<LL>      09 Aug 2023 02:38  Phos  3.9     08-09  Mg     1.8     08-09    TPro  4.0<L>  /  Alb  1.8<L>  /  TBili  2.2<H>  /  DBili  x   /  AST  22  /  ALT  15  /  AlkPhos  69  08-08 08-08 @ 07:01 - 08-09 @ 07:00  IN: 5885.6 mL / OUT: 2840 mL / NET: 3045.6 mL     Bacteriology:  08/08 sputum GS: mod GNR  08/08 BLood CS Acinetobacter baumanii pseudomonas enterococcus  08/03 Blood CS NG5D x2   07/31 sputum rare to few Klebsiella    CSF studies:  EEG:  Neuroimaging:  Other imaging:    MEDICATIONS: 08-09    ·	lacosamide IVPB 50 IV Intermittent every 12 hours  ·	metoclopramide Injectable 10 IV Push every 8 hours  ·	propofol Infusion 50 IV Continuous <Continuous>  ·	midodrine 15 milliGRAM(s) Oral every 8 hours  ·	acetylcysteine 10%  Inhalation 4 Inhalation every 6 hours  ·	albuterol/ipratropium for Nebulization 3 Nebulizer every 6 hours  ·	sodium chloride 3%  Inhalation 4 Inhalation every 6 hours  ·	pantoprazole  Injectable 40 IV Push every 12 hours  ·	simethicone 80 Chew every 6 hours  ·	sucralfate suspension 1 Oral every 6 hours  ·	atorvastatin 20 Oral at bedtime  ·	dexAMETHasone  Injectable 2 IV Push every 12 hours  ·	fludroCORTISONE 0.2 Oral daily  ·	insulin lispro (ADMELOG) corrective regimen sliding scale  SubCutaneous every 6 hours  ·	lactobacillus acidophilus 1 Oral daily  ·	multivitamin 1 Oral daily  ·	petrolatum Ophthalmic Ointment 1 Both EYES three times a day  ·	sodium chloride 3 Oral every 6 hours  ·	acetaminophen     Tablet .. 650 Oral every 6 hours PRN     IV FLUIDS: NS@30cc/hr  DRIPS:  ·	norepinephrine Infusion 0.05 MICROgram(s)/kG/Min IV Continuous <Continuous> 0.04  ·	phenylephrine    Infusion 0.1 MICROgram(s)/kG/Min IV Continuous <Continuous>  ·	vasopressin Infusion 0.04 IV Continuous <Continuous>  ·	cisatracurium Infusion 3 IV Continuous <Continuous>  ·	fentaNYL   Infusion. 0.5 IV Continuous <Continuous>  DIET: TF Vital @40  Lines:  Drains:    Wounds:    CODE STATUS:  Full Code                       GOALS OF CARE:  aggressive                      DISPOSITION:  ICU =================================  NEUROCRITICAL CARE ATTENDING NOTE  =================================    JORDAN CHAKRABORTY   [id number]  Summary:  68y/M  with type 2 diabetes, hyperlipidemia, iron deficiency anemia, tracheal stenosis s/p tracheostomy (which was closed by ENT 7 days ago); glioblastoma s/p resection 1/27/2022, and Avastin treatment 3/2023 presenting with altered mental status and weakness x2 days. Per family, they last saw him last night around 6pm when he was at baseline. Patient has intermittent expressive aphasia but is getting worse and now is persistent, and has intermittent nausea and vomiting since last night. Patient was supposed to receive an outpatient brain MRI on 7/25/2023. Stroke code performed in  is negative for CVA. Head CT shows new hyperdensity in frontal parietal - surgical site. MRI is ordered to rule out tumor recurrence.  Per daughter at bedside, Entresto and Eliquis were stopped per his PC - cardiologist.   (07 Jul 2023 13:10)    COURSE IN THE HOSPITAL:  07/07 Admitted to [facility], started on 3%  07/08 37.8 pancultured No significant events overnight.   07/09 No significant events overnight.    07/29 back to ICU for septic shock - Klebsiella  08/07 back on pressors overnight, CT overnight, 1/2 amp glucose given  08/08 Tmax 38.3 septic shock, bacteremic  08/09 Tmax 38.2 desaturation overnight, pressor requirements increased, stared on  austin / vasopressin; CXR ARDS, paralyzed with  nimbex, given Ca Gluc, 1 amp NaHCO3, given platelet (40s): INR 1.9, given 10 vit K, given tobra x1 dose, vanc extra dose;    Past Medical History: No pertinent past medical history diabetes mellitus Hypertension  Glioblastoma  Type 2 diabetes mellitus  Hyperlipidemia  Iron deficiency anemia  Nephrolithiasis  Thrombocytopenia  Hyponatremia  BPH (benign prostatic hyperplasia)  Allergies:  amoxicillin (Rash)  Home meds:   ·	aspirin 81 mg oral tablet: 1 tab(s) orally once a day  ·	atorvastatin 20 mg oral tablet: 1 tab(s) orally once a day (at bedtime)  ·	escitalopram 5 mg oral tablet: 1 tab(s) orally once a day  ·	fluticasone 50 mcg/inh nasal spray: 1 spray(s) nasal 2 times a day  ·	Januvia 100 mg oral tablet: 1 tab(s) orally once a day  ·	levETIRAcetam 750 mg oral tablet: 1 tab(s) orally 2 times a day  ·	melatonin 3 mg oral tablet: 2 tab(s) orally once a day (at bedtime)  ·	ondansetron 4 mg oral tablet: 1 tab(s) orally once a day  ·	pantoprazole 40 mg oral delayed release tablet: 1 tab(s) orally once a day (before a meal)  ·	senna (sennosides) 8.6 mg oral tablet: 1 tab(s) orally once a day as needed for  constipation  ·	sodium chloride 1 g oral tablet: 2 tab(s) orally every 6 hours    PHYSICAL EXAMINATION  T(C): 37.7 (08-09 @ 05:44), Max: 38.2 (08-08 @ 08:00) HR: 101 (08-09 @ 07:00) (90 - 118) BP: 107/61 (08-09 @ 07:00) (80/52 - 157/69) RR: 22 (08-09 @ 07:00) (16 - 26) SpO2: 98% (08-09 @ 07:00) (89% - 100%)   NEUROLOGIC EXAMINATION:  Patient is paralyzed  GENERAL: 12 40 400 +5   EENT:  anicteric, trach site clean  CARDIOVASCULAR: (+) S1 S2, normal rate and regular rhythm  PULMONARY: clear to auscultation bilaterally  ABDOMEN: soft, nontender with normoactive bowel sounds  EXTREMITIES: no edema  SKIN: no rash    LABS: 08-09  CAPILLARY BLOOD GLUCOSE 93 87 107 117     (3.67)  9.4  (8.9)  3.11  )-----------( 44  (70)     ( 09 Aug 2023 02:38 )             28.4   (135 )  133<L>  |  108  |  12  ----------------------------<  169<H>  4.4   |  18<L>  |  0.48<L>    Ca    6.2<LL>      09 Aug 2023 02:38  Phos  3.9     08-09  Mg     1.8     08-09    TPro  4.0<L>  /  Alb  1.8<L>  /  TBili  2.2<H>  /  DBili  x   /  AST  22  /  ALT  15  /  AlkPhos  69  08-08    PT/INR - ( 09 Aug 2023 02:38 )   PT: 20.7 sec;   INR: 1.85    PTT - ( 09 Aug 2023 02:38 )  PTT:38.8 sec    08-08 @ 07:01  -  08-09 @ 07:00  IN: 5885.6 mL / OUT: 2840 mL / NET: 3045.6 mL     Bacteriology:  08/08 sputum GS: mod GNR  08/08 BLood CS Acinetobacter baumanii pseudomonas enterococcus  08/03 Blood CS NG5D x2   07/31 sputum rare to few Klebsiella    CSF studies:  EEG:  Neuroimaging:  Other imaging:    MEDICATIONS: 08-09    ·	piperacillin/tazobactam 4.5 q8h  ·	vancomycin 1500 q12   ·	lacosamide IVPB 50 IV Intermittent every 12 hours  ·	metoclopramide Injectable 10 IV Push every 8 hours  ·	propofol Infusion 50 IV Continuous <Continuous>  ·	midodrine 15 milliGRAM(s) Oral every 8 hours  ·	acetylcysteine 10%  Inhalation 4 Inhalation every 6 hours  ·	albuterol/ipratropium for Nebulization 3 Nebulizer every 6 hours  ·	sodium chloride 3%  Inhalation 4 Inhalation every 6 hours  ·	pantoprazole  Injectable 40 IV Push every 12 hours  ·	simethicone 80 Chew every 6 hours  ·	sucralfate suspension 1 Oral every 6 hours  ·	atorvastatin 20 Oral at bedtime  ·	dexAMETHasone  Injectable 2 IV Push every 12 hours  ·	fludroCORTISONE 0.2 Oral daily  ·	insulin lispro (ADMELOG) corrective regimen sliding scale  SubCutaneous every 6 hours  ·	lactobacillus acidophilus 1 Oral daily  ·	multivitamin 1 Oral daily  ·	petrolatum Ophthalmic Ointment 1 Both EYES three times a day  ·	sodium chloride 3 Oral every 6 hours  ·	acetaminophen     Tablet .. 650 Oral every 6 hours PRN     IV FLUIDS: 3%@30cc/hr;   DRIPS:  ·	norepinephrine Infusion 0.05 MICROgram(s)/kG/Min IV Continuous <Continuous> 0.6  ·	phenylephrine    Infusion 0.1 MICROgram(s)/kG/Min IV Continuous <Continuous> - OFF  ·	vasopressin Infusion 0.04 IV Continuous <Continuous> 0.04  ·	cisatracurium Infusion 3 IV Continuous <Continuous> 1.3   ·	fentaNYL   Infusion. 0.5 IV Continuous <Continuous> 0.5  ·	propofol 50  DIET: OFF  Lines: R IJ Kaylah  Drains:   Campuzano   Wounds:    CODE STATUS:  Full Code                       GOALS OF CARE:  aggressive                      DISPOSITION:  ICU

## 2023-08-09 NOTE — PROGRESS NOTE ADULT - ASSESSMENT
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  He was medically managed.  Course c/b septic shock due to K.pneumo bacteremia s/p treatment with CTX and Flagyl. Patient developed septic shock with ARDS likely 2/2 aspiration PNA found to have PsA, E. faecalis, and Clostridium perfringens. s/p tobramycin x 1 last night. Currently on empiric vanc/zosyn     Suggest:  -Obtain surveillance bcxs today   -Check STAT tobramycin level. If <2, then redose tobramycin 460 mg x 1 tonight at 10pm  -Obtain CT A/P with contrast    -Discontinue vancomycin   -Continue Zosyn 4.5 g IV q8h via EI       Team 2 will follow you  Case d/w primary team.  Final recommendation pending attending note.    Kelsey Jolley, Infectious Diseases PA  Please reach out for any questions 9 am-5pm. For evenings and weekends, please call the ID physician on call.  Work cell: [phone]

## 2023-08-09 NOTE — PROGRESS NOTE ADULT - SUBJECTIVE AND OBJECTIVE BOX
NSCU ATTENDING -- ADDITIONAL PROGRESS NOTE    Nighttime rounds were performed     69 y/o male with PMH of type 2 diabetes, hyperlipidemia, iron deficiency anemia, tracheal stenosis s/p tracheostomy, glioblastoma s/p resection 1/27/2022, and Avastin treatment 3/2023 presenting with altered mental status and weakness x2 days. Per family, LKN 6pm on 07/05 when he was at baseline. Patient has intermittent expressive aphasia but is getting worse and now is persistent, and has intermittent nausea and vomiting since 07/05. Patient was scheduled to have outpatient brain MRI on 7/25/2023.  Stroke code called in ED. Head CT shows new hyperdensity in frontal parietal - surgical site. MRI ordered to rule out tumor recurrence.   Per daughter at bedside, Entresto and Eliquis were stopped per his PC - cardiologist.  (07 Jul 2023 13:10)      ICU Vital Signs Last 24 Hrs  T(C): 37.1 (09 Aug 2023 18:00), Max: 38.7 (09 Aug 2023 15:00)  T(F): 98.8 (09 Aug 2023 18:00), Max: 101.7 (09 Aug 2023 15:00)  HR: 108 (09 Aug 2023 19:00) (91 - 119)  BP: 115/59 (09 Aug 2023 17:00) (107/61 - 149/70)  BP(mean): 80 (09 Aug 2023 17:00) (78 - 101)  ABP: 108/46 (09 Aug 2023 19:00) (94/46 - 164/66)  ABP(mean): 67 (09 Aug 2023 19:00) (65 - 101)  RR: 22 (09 Aug 2023 19:00) (16 - 24)  SpO2: 97% (09 Aug 2023 19:00) (93% - 100%)      08-08-23 @ 07:01  -  08-09-23 @ 07:00  --------------------------------------------------------  IN: 6070.6 mL / OUT: 3440 mL / NET: 2630.6 mL    08-09-23 @ 07:01 - 08-09-23 @ 20:05  --------------------------------------------------------  IN: 2478.5 mL / OUT: 1325 mL / NET: 1153.5 mL      Mode: AC/ CMV (Assist Control/ Continuous Mandatory Ventilation), RR (machine): 22, TV (machine): 410, FiO2: 40, PEEP: 8, ITime: 1, MAP: 7, PIP: 17    PHYSICAL EXAM:  General: Cachectic in appearance. Temporal wasting  Intubated, sedated and paralysed  HEENT: Tracheostomy site C/D/I, NGT  Neurological: Pupils 2mm and reactive  Pulmonary: Diminished throughout  Cardiovascular: S1, S2, RRRm tachycardic  Gastrointestinal: Soft, nontender, nondistended   : Campuzano catheter in place  Extremities: Upper extremity edema noted  Skin: Petechial rash diffusely over trunk and groin    MEDICATIONS:   acetaminophen     Tablet .. 650 milliGRAM(s) Oral every 6 hours PRN  acetylcysteine 10%  Inhalation 4 milliLiter(s) Inhalation every 6 hours  albuterol/ipratropium for Nebulization 3 milliLiter(s) Nebulizer every 6 hours  atorvastatin 20 milliGRAM(s) Oral at bedtime  chlorhexidine 0.12% Liquid 15 milliLiter(s) Oral Mucosa every 12 hours  chlorhexidine 2% Cloths 1 Application(s) Topical <User Schedule>  chlorhexidine 4% Liquid 1 Application(s) Topical <User Schedule>  cisatracurium Infusion 3 MICROgram(s)/kG/Min (13.1 mL/Hr) IV Continuous <Continuous>  dexAMETHasone  Injectable 2 milliGRAM(s) IV Push every 12 hours  fentaNYL   Infusion. 0.5 MICROgram(s)/kG/Hr (3.63 mL/Hr) IV Continuous <Continuous>  fludroCORTISONE 0.2 milliGRAM(s) Oral daily  insulin lispro (ADMELOG) corrective regimen sliding scale   SubCutaneous every 6 hours  lacosamide IVPB 50 milliGRAM(s) IV Intermittent every 12 hours  lactobacillus acidophilus 1 Tablet(s) Oral daily  metoclopramide Injectable 10 milliGRAM(s) IV Push every 8 hours  multivitamin 1 Tablet(s) Oral daily  norepinephrine Infusion 0.05 MICROgram(s)/kG/Min (3.4 mL/Hr) IV Continuous <Continuous>  pantoprazole  Injectable 40 milliGRAM(s) IV Push every 12 hours  petrolatum Ophthalmic Ointment 1 Application(s) Both EYES three times a day  phytonadione  IVPB 10 milliGRAM(s) IV Intermittent daily  piperacillin/tazobactam IVPB.. 4.5 Gram(s) IV Intermittent every 8 hours  propofol Infusion 50 MICROgram(s)/kG/Min (21.8 mL/Hr) IV Continuous <Continuous>  simethicone 80 milliGRAM(s) Chew every 6 hours  sodium chloride 3 Gram(s) Oral every 6 hours  sodium chloride 0.9% lock flush 10 milliLiter(s) IV Push every 1 hour PRN  sodium chloride 0.9% lock flush 10 milliLiter(s) IV Push every 1 hour PRN  sodium chloride 3%  Inhalation 4 milliLiter(s) Inhalation every 6 hours  sodium chloride 3%. 500 milliLiter(s) (30 mL/Hr) IV Continuous <Continuous>  sucralfate suspension 1 Gram(s) Oral every 6 hours  tobramycin IVPB 460 milliGRAM(s) IV Intermittent once  vasopressin Infusion 0.04 Unit(s)/Min (6 mL/Hr) IV Continuous <Continuous>    LABS:                      9.1    4.63  )-----------( 46       ( 09 Aug 2023 13:33 )             27.5     08-09    136  |  110<H>  |  12  ----------------------------<  143<H>  4.1   |  18<L>  |  0.47<L>    Ca    7.2<L>      09 Aug 2023 13:33  Phos  3.2     08-09  Mg     1.9     08-09    TPro  4.1<L>  /  Alb  1.7<L>  /  TBili  2.3<H>  /  DBili  x   /  AST  42<H>  /  ALT  22  /  AlkPhos  73  08-09    LIVER FUNCTIONS - ( 09 Aug 2023 13:33 )  Alb: 1.7 g/dL / Pro: 4.1 g/dL / ALK PHOS: 73 U/L / ALT: 22 U/L / AST: 42 U/L / GGT: x           ABG - ( 09 Aug 2023 09:55 )  pH, Arterial: 7.30  pH, Blood: x     /  pCO2: 37    /  pO2: 88    / HCO3: 18    / Base Excess: -7.5  /  SaO2: 99.0        ASSESSMENT:   69 y/o M with septic shock secondary to Klebsiella bacteremia   Recurrent shock- likely septic secondary to PNA ?aspiration  Hypoxemic respiratory failure  ARDS  Bacteremia  GI bleed  GBM s/p resection, recent chemotherapy  History of Takotsubo cardiomyopathy  Chronic hyponatremia secondary to SIADH  Type 2 DM      PLAN  NEURO:  Pupil checks Q1  Analgesia: Fentanyl gtt  Sedation: Propofol RASS neg 5  Paralytic: Nimbex train of four 2/4   CT head 8/6: Stable. No significant heme  Seizure history: Continue vimpat. VEEG negative for seizures. DCed.  Cerebral edema: On dexamethasone 2mg Q12  Activity: Bedrest.   Palliative care following    PULMONARY: Concern for ARDS likely secondary to aspiration  P/F ratio: 61  Lung protective ventilation. Monitor peak/plateau  CXR, ABG  VAP bundle. Pulmonary toilet  POCUS: B lines R>L. IVC difficult to visualize. B/L effusions.     CARDIOVASCULAR: Hypotension in setting of likely septic shock (recurrent); bacteremia.   MAP goal 65-75  Pressors: Levophed and vasopressin  TTE: EF 60-65%. No WMA, no vegetation  Lactate: 2.5. Trend   Consider stress dose steroids  POCUS- see pulm  Flotrac, SCVO2  Troponin, proBNP, EKG    GASTROENTEROLOGY: Esophagitis and non-bleeding ulcer, r/o ileus.  GI ppx: PPI bid. Continue sucralfate  Will need PEG eventually; gen surg  AXR 8/5- no significant ileus. AXR 8/8:  On reglan. LBM: 8/6.   Keep NPO for now. NGT to LIWS  GI following    RENAL/: Chronic hyponatremia secondary to SIADH, urine retention. Difficult catheterisation.  Hyponatremia- improving. Hypertonics: 3% at 30cc/hr  Campuzano catheter placed by Urology. Maintain per Urology. Monitor Is/Os  Na goal 135-145; repeat BMP  Urology/nephrology following  Corrected Ca 9.5    ENDOCRINE: Diabetes mellitus  A1c- 5.7  ISS while NPO. Lantus DCed  Monitor fingersticks  AM cortisol    HEME/ONC: Pancytopenia likely chemo-induced vs sepsis, bone marrow suppression, R/O hemolysis, coagulopathy  DIC ruled out.   Give Vit K  Trend H/H and plt. Hb goal >7.0, plt goal >50  Plt antibody negative  DVT ppx: Will hold chemical DVT ppx in setting of low platelets/ bleed  B/L SCDs  Heme following    INFECTIOUS: S/P Klebsiella bacteremia. Now with recurrent GPR/GPC bacteremia  S/P CTX and flagyl until 8/5-DCed. On Vanc/Zosyn. Tobramycin x1.  ID following  Cultures: Blood GPC in pairs and chains, GPR. ID aware  Repeat blood cultures 8/9    MISC:  Family meeting scheduled for 8/10.   Given sudden deterioration and change on clinical status, contacted daughter Roxanne. Will be present asap.     SOCIAL/FAMILY:  [] awaiting [x] updated daughter and son-in- law at bedside [] family meeting    CODE STATUS:  [x] Full Code [] DNR [] DNI [] Palliative/Comfort Care    DISPOSITION:  [x] ICU [] Stroke Unit [] Floor [] EMU [] RCU [] PCU    Additional critical care time: 60 minutes                                   NSCU ATTENDING -- ADDITIONAL PROGRESS NOTE    Nighttime rounds were performed     69 y/o male with PMH of type 2 diabetes, hyperlipidemia, iron deficiency anemia, tracheal stenosis s/p tracheostomy, glioblastoma s/p resection 1/27/2022, and Avastin treatment 3/2023 presenting with altered mental status and weakness x2 days. Per family, LKN 6pm on 07/05 when he was at baseline. Patient has intermittent expressive aphasia but is getting worse and now is persistent, and has intermittent nausea and vomiting since 07/05. Patient was scheduled to have outpatient brain MRI on 7/25/2023.  Stroke code called in ED. Head CT shows new hyperdensity in frontal parietal - surgical site. MRI ordered to rule out tumor recurrence.   Per daughter at bedside, Entresto and Eliquis were stopped per his PC - cardiologist.  (07 Jul 2023 13:10)      ICU Vital Signs Last 24 Hrs  T(C): 37.1 (09 Aug 2023 18:00), Max: 38.7 (09 Aug 2023 15:00)  T(F): 98.8 (09 Aug 2023 18:00), Max: 101.7 (09 Aug 2023 15:00)  HR: 108 (09 Aug 2023 19:00) (91 - 119)  BP: 115/59 (09 Aug 2023 17:00) (107/61 - 149/70)  BP(mean): 80 (09 Aug 2023 17:00) (78 - 101)  ABP: 108/46 (09 Aug 2023 19:00) (94/46 - 164/66)  ABP(mean): 67 (09 Aug 2023 19:00) (65 - 101)  RR: 22 (09 Aug 2023 19:00) (16 - 24)  SpO2: 97% (09 Aug 2023 19:00) (93% - 100%)      08-08-23 @ 07:01  -  08-09-23 @ 07:00  --------------------------------------------------------  IN: 6070.6 mL / OUT: 3440 mL / NET: 2630.6 mL    08-09-23 @ 07:01 - 08-09-23 @ 20:05  --------------------------------------------------------  IN: 2478.5 mL / OUT: 1325 mL / NET: 1153.5 mL      Mode: AC/ CMV (Assist Control/ Continuous Mandatory Ventilation), RR (machine): 22, TV (machine): 410, FiO2: 40, PEEP: 8, ITime: 1, MAP: 7, PIP: 17    PHYSICAL EXAM:  General: Cachectic in appearance. Temporal wasting  Intubated, sedated and paralysed  HEENT: Tracheostomy site C/D/I, NGT  Neurological: Pupils 2mm and reactive  Pulmonary: Diminished throughout  Cardiovascular: S1, S2, RRR, tachycardic  Gastrointestinal: Soft, nontender, nondistended   : Campuzano catheter in place  Extremities: Upper extremity edema noted  Skin: Petechial rash diffusely over trunk and groin      MEDICATIONS:   acetaminophen     Tablet .. 650 milliGRAM(s) Oral every 6 hours PRN  acetylcysteine 10%  Inhalation 4 milliLiter(s) Inhalation every 6 hours  albuterol/ipratropium for Nebulization 3 milliLiter(s) Nebulizer every 6 hours  atorvastatin 20 milliGRAM(s) Oral at bedtime  chlorhexidine 0.12% Liquid 15 milliLiter(s) Oral Mucosa every 12 hours  chlorhexidine 2% Cloths 1 Application(s) Topical <User Schedule>  chlorhexidine 4% Liquid 1 Application(s) Topical <User Schedule>  cisatracurium Infusion 3 MICROgram(s)/kG/Min (13.1 mL/Hr) IV Continuous <Continuous>  dexAMETHasone  Injectable 2 milliGRAM(s) IV Push every 12 hours  fentaNYL   Infusion. 0.5 MICROgram(s)/kG/Hr (3.63 mL/Hr) IV Continuous <Continuous>  fludroCORTISONE 0.2 milliGRAM(s) Oral daily  insulin lispro (ADMELOG) corrective regimen sliding scale   SubCutaneous every 6 hours  lacosamide IVPB 50 milliGRAM(s) IV Intermittent every 12 hours  lactobacillus acidophilus 1 Tablet(s) Oral daily  metoclopramide Injectable 10 milliGRAM(s) IV Push every 8 hours  multivitamin 1 Tablet(s) Oral daily  norepinephrine Infusion 0.05 MICROgram(s)/kG/Min (3.4 mL/Hr) IV Continuous <Continuous>  pantoprazole  Injectable 40 milliGRAM(s) IV Push every 12 hours  petrolatum Ophthalmic Ointment 1 Application(s) Both EYES three times a day  phytonadione  IVPB 10 milliGRAM(s) IV Intermittent daily  piperacillin/tazobactam IVPB.. 4.5 Gram(s) IV Intermittent every 8 hours  propofol Infusion 50 MICROgram(s)/kG/Min (21.8 mL/Hr) IV Continuous <Continuous>  simethicone 80 milliGRAM(s) Chew every 6 hours  sodium chloride 3 Gram(s) Oral every 6 hours  sodium chloride 0.9% lock flush 10 milliLiter(s) IV Push every 1 hour PRN  sodium chloride 0.9% lock flush 10 milliLiter(s) IV Push every 1 hour PRN  sodium chloride 3%  Inhalation 4 milliLiter(s) Inhalation every 6 hours  sodium chloride 3%. 500 milliLiter(s) (30 mL/Hr) IV Continuous <Continuous>  sucralfate suspension 1 Gram(s) Oral every 6 hours  tobramycin IVPB 460 milliGRAM(s) IV Intermittent once  vasopressin Infusion 0.04 Unit(s)/Min (6 mL/Hr) IV Continuous <Continuous>    LABS:                      9.1    4.63  )-----------( 46       ( 09 Aug 2023 13:33 )             27.5     08-09    136  |  110<H>  |  12  ----------------------------<  143<H>  4.1   |  18<L>  |  0.47<L>    Ca    7.2<L>      09 Aug 2023 13:33  Phos  3.2     08-09  Mg     1.9     08-09    TPro  4.1<L>  /  Alb  1.7<L>  /  TBili  2.3<H>  /  DBili  x   /  AST  42<H>  /  ALT  22  /  AlkPhos  73  08-09    LIVER FUNCTIONS - ( 09 Aug 2023 13:33 )  Alb: 1.7 g/dL / Pro: 4.1 g/dL / ALK PHOS: 73 U/L / ALT: 22 U/L / AST: 42 U/L / GGT: x           ABG - ( 09 Aug 2023 09:55 )  pH, Arterial: 7.30  pH, Blood: x     /  pCO2: 37    /  pO2: 88    / HCO3: 18    / Base Excess: -7.5  /  SaO2: 99.0        ASSESSMENT:   69 y/o M with septic shock secondary to Klebsiella bacteremia   Recurrent shock- likely septic secondary to PNA ?aspiration  Hypoxemic respiratory failure  ARDS  Bacteremia  GI bleed  GBM s/p resection, recent chemotherapy  History of Takotsubo cardiomyopathy  Chronic hyponatremia secondary to SIADH  Type 2 DM      PLAN  NEURO:  Pupil checks Q1  Analgesia: Fentanyl gtt  Sedation: Propofol RASS neg 5  Paralytic: Nimbex train of four 2/4 / vent synchrony  CT head 8/6: Stable. No significant heme  Seizure history: Continue vimpat. VEEG negative for seizures. DCed.  Cerebral edema: On dexamethasone 2mg Q12  Activity: Bedrest.   Palliative care following    PULMONARY: Concern for ARDS likely secondary to aspiration  Lung protective ventilation. Monitor peak/plateau  CXR, ABG  VAP bundle. Pulmonary toilet  POCUS: B lines R>L. IVC difficult to visualize. B/L effusions.     CARDIOVASCULAR: Hypotension in setting of likely septic shock (recurrent); bacteremia.   MAP goal 65-70  Pressors: Levophed and vasopressin  TTE: EF 60-65%. No WMA, no vegetation  Lactate: 2.5-> 1.9    GASTROENTEROLOGY: Esophagitis and non-bleeding ulcer, r/o ileus.  GI ppx: PPI bid. Continue sucralfate  Will need PEG eventually; gen surg  AXR 8/5- no significant ileus. Consider CT abd/pelvis once stable  On reglan. LBM: 8/7  Keep NPO for now. NGT to LIWS  GI following    RENAL/: Chronic hyponatremia secondary to SIADH, urine retention. Difficult catheterisation  Metabolic acidosis  Hyponatremia- improving. Hypertonics: 3% at 30cc/hr  Campuzano catheter placed by Urology. Maintain per Urology. Monitor Is/Os  Na goal 135-145  Urology/nephrology following    ENDOCRINE: Diabetes mellitus  A1c- 5.7  ISS while NPO.Monitor fingersticks  AM cortisol    HEME/ONC: Pancytopenia likely chemo-induced vs sepsis, bone marrow suppression, R/O hemolysis, coagulopathy  DIC ruled out.   Trend H/H and plt. Hb goal >7.0, plt goal >50  Plt antibody negative  DVT ppx: Will hold chemical DVT ppx in setting of low platelets/ bleed  B/L SCDs  Heme following    INFECTIOUS: S/P Klebsiella bacteremia. Now with enterococcus faecalis, pseudomonas and clostridium perfringens bacteremia  On Zosyn. Dosed with tobramycin.  ID following  Repeat blood cultures 8/9; follow up  CT abd/pelvis if stable.     MISC:  Family meeting scheduled for 8/10.   [] awaiting [x] updated daughter and son-in- law at bedside [] family meeting      Continue care per daytime intensivist Bello AMIN  Additional critical care time: 60 minutes

## 2023-08-09 NOTE — PROGRESS NOTE ADULT - SUBJECTIVE AND OBJECTIVE BOX
HPI:  Osiel Norwood is a 69YO male with a past medical history of type 2 diabetes, hyperlipidemia, iron deficiency anemia, tracheal stenosis s/p tracheostomy (which was closed by ENT 7 days ago); glioblastoma s/p resection 2022, and Avastin treatment 3/2023 presenting with altered mental status and weakness x2 days. Per family, they last saw him last night around 6pm when he was at baseline. Patient has intermittent expressive aphasia but is getting worse and now is persistent, and has intermittent nausea and vomiting since last night. Patient was supposed to receive an outpatient brain MRI on 2023.  Stroke code performed in  is negative for CVA. Head CT shows new hyperdensity in frontal parietal - surgical site. MRI is ordered to rule out tumor recurrence.   Per daughter at bedside, Entresto and Eliquis were stopped per his PC - cardiologist.      (2023 13:10)    Hospital Course:  : Admitted. Na 115 in ED with repeat 118, started 3% at 30cc/hr, and salt tabs 3q6, stability CTH in ED showing Interval development of a 13 mm hyperdense nodule along the   inferior margin of the resection cavity which may represent progression   of disease with hemorrhage, repeat CTH stable, urine studies ordered  : Neuro stable, 12AM BMP Na114 3% increased to 40cc/hr, urine studies, pancx to r/o infection since pt is immunocompromised. Changed pantoprazole to sucralfate for GI ppx d/t thrombocytopenia. LE dopplers negative. DC'd 3%. ENT consulted to assess stoma, recommend follow up upon discharge with ENT, Repeat sodium 122. Restarted 3% at 30.  : ANA LILIA o/n neuro stable. remains on 3%@30cc/hr. Started florinef, increased 3% to 50cc/hr. Increased 3% to 75cc/hr.    7/10: continued current 3% rate o/n. Na 127 --> 125, cont 3% @75cc/hr, f/u repeat Na this evening, likely stepdown tomorrow. Pend dispo home with home PT/OT when able. Heme consulted for thrombocytopenia  : ANA LILIA o/n. Remains on 3% @75cc/hr. Decreased 3% to 50cc/hr. Started 1.2L fluid restriction. Repeat Na corrected 128  : ANA LILIA overnight, remains on 3%, SDU from ICU  : ANA LILIA overnight, neuro stable, on 3%@50, Am sodium 137, decreased 3% to 25cc/hr, repeat Na 138, decreased to 15cc/hr.  : ANA LILIA overnight, neuro stable, remains on 3%@15cc/hr  7/15: ANA LILIA overnight. Neuro exam stable. Pt remains on 3% saline.Sodium 134 this AM remains on 3%@25. Per nephrology recs hypertonic Na d'ceed, Na tabs 2q6, URENA 15g BID, cont florinef/ fluid restriction.Per nephrology Na >130 ok when ready for d/c   : ANA LILIA overnight, hypertonics d/c now on urea powder/1L fluid restriction/florinef and salt tabs per renal, hydrocortisone cream ordered for rash on back, rec for Home PT  : ANA LILIA overnight, following Na, renal following, ENT saw for hoarse voice rec followup with CT surgery for possible dilation, pending Home PT. Patient developed sudden onset severe headache. Hyperventilating with increased work of breathing. Wheezing in all lung fields to auscultation. Neuro intact on exam. Duoneb x1 ordered. Dilaudid 0.25 IV given for pain control. Subsequently patient developed nausea with multiple episodes of vomiting. Hypertensive with SBP in the 190s. Given hydralazine 10mg IVP, Zofran. Stroke code and rapid response called. STAT labs sent. CTH/CTA/CTP completed. Tachycardic to the 180s, consistent with SVT, remained hypertensive. Given 10 of labetalol, SVT broke. Transferred to Telemetry. WBC 23.43, lactate 8.5. Given 1L fluid bolus. Pan culture sent. Started empirically on Vanc/Meropenem.   : Put on eeg. Voiding while retaining urine, SC for 500cc. EEG +spikes, epilepsy consulted. CTA chest and CT A/P pending. Keppra increased 1g BID.   : Cont EEG, trend Na, possible NGT today if not able to tolerate PO. Blood cx growing GS + cocci in clusters, f/u MRSA swab and pan cx, cont meropenem and vancomycin. Vanc trough in am. F/u urine studies.   : Off EEG, neuro exam improved. ID following for concern for sepsis, likely aerobic blood cx bottle staph epi contaminant, cont monitoring off of abx, possible drug reaction as cause. F/u surveillance blood cx. Hyponatremia, repeat Na 129 overnight from 130, cont current regimen and f/u am labs and nephrology recs. AM na 129 continuing fluid restriction, nephro recommending restarting urena, but holding due to possible drug reaction previously.   : neurologically stable, c/w fluid restriction, f/u AM Na, urine osm, urine Na, cortisol. Given 15mg of tolvaptan. Fluid restriction, florinef d/c'd per nephro recs. Held salt tabs for today. 6pm BMP Na 122, 10pm BMP Na 131, urine osm 141  : ANA LILIA ovn, neuro stable. AM Na 133. Restarted salt tabs 3q6 and 1L fluid restriction. Lantus 10u at bedtime added. 9:30pm BMP per nephro Na 138  : ANA LILIA ovn, neuro stable. Na stable, discussed with nephrology can cont tolvaptan 15mg daily PRN for hyponatremia, only needed for hypoNa, can discontinue fluid restriction. Pt evaluated at bedside after nurse noted patient to have some expressive aphasia and perseverating; patient oriented to self, unable to say hospital or year, following all commands and DUMONT 5/5 strength, no drift, no facial droop, perseverating on pt's  when asked other questions. Pt afebrile, vitals stable, cont keppra 1g BID, cont decadron 4mg BID, discussed with Dr. Dorado, defer CTH at this time and monitor clinically. . Lantus increased to 14u at bedtime.  : o/n PSVT 13 beats followed by sinus tachycardia in setting of anxiety and net negative fluid balance.  Resolved to 102 with verbal comfort and further resolved with 500 cc NS. Lantus increased to 18u qhs. Dr. Costa consulted.  : ANA LILIA overnight. Neuro stable.   : ANA LILIA overnight. Neuro exam stable. Dispo pending.  : multiple BMs o/n. Na stable. Lexapro increased to 10mg daily for worsening depression. Held bowel regimen 2/2 multiple stools.   : ANA LILIA overnight, Na 133 f/u AM labs, pending [place]. Pt has episode of sustained tachycardia, c/o feeling warm, and dizzy while working with Pt. Given 500cc NS bolus, rectal temp was 99F. Family brought in medical marijuana. Pt had rectal temp 100.9, pancultured. Given another 1L bolus and started on maintenance fluids. Rectal temp 102. Started empirically on vanc/cipro/flagyl. Upgraded to telemetry.  : In AM patient had episode of emesis and was unresponsive to sternal rub. Dr. Huggins, neurointensivist at bedside. Repeat BP 60s/40s, tachycardic to 110s. Rapid response called. Levo gtt started. Upgraded to ICU. Intubated for airway protection. Likely septic shock given GNR in blood cx growing K.pneumoniae, Given additional 1L bolus x2. Abx changed to vanc and meropenem, ID following, rec to d/c vanc, c/w meropenem [id number], FOBT positive, keppra changed to vimpat 50BID, fentanyl 25mg q4hrs prn for vent dysynchrony, home lexapro decreased to 5mg, decadron decreased to 2q12, sucralfate increased to 1q6 iso GI bleed, pending repeat CBC, CMP, ABG, DIC labs, xray abdomen to r/o obstruction, propofol gtt started for sedation, weaning off precedex gtt, GI consulted rec protonix 40bid iso possible GI bleed, plan for possible EGD, albumin 1.7, 25% albumin 50ml given, s/p 1u PRBC and PLT repeat cbc hgb 8.0 from 6.6 and PLT 88 from 55. Protonix gtt started per GI recs.   : ANA LILIA overnight. Neuro stable. Hemoglobin 6.5, platelets 47 o/n, transfused 1uPRBC, 1u platelets in AM. Repeat Hb 7.1 and platelets 81. CBC repeated and Hb 7.2 and platelets 74. Arine test negative. 1 set of surveillance blood cultures sent. NS increased to 120cc/hr. Holding off on CT C/A/P per nephrology recs due to concern about IV contrast. Temp 100.8 F, given tylenol. Additional 1u PRBC given in evening for Hb 6.8.   : 1u prbc given in am. 6 units lantus started at bedtime. Per ID stopped meropenam and started ceftriaxone 1qd and flagyl 500 q8.  Plan for EGD tomorrow with GI. Blood and urine cx sent per ID recs.   : Tachycardic, gave 20 IV lasix. Hgb at the time stable. NS@20 for renal protection. lantus increased to 14u. Lactate normalized. Given 500cc bolus for soft pressures, restarted levo gtt temporarily, now off. Endoscopy done with GI showed severe circumferential esophagitis, small ulcer to anterior stomach. propofol changed to precedex.   : ANA LILIA overnight, remains sedated on precedex and intubated on ACVC.  Pt tolerating CPAP. Campuzano removed, failed TOV, and straight catheterized. Trickle feeds started 10cc/hr. Changed protonix gtt transitioned to 40mg IV BID per GI. ENT consulted for trach planning.  8/3: Remains on levo. Campuzano replaced by urology due to difficulty straight catheterizing. TF held for high residuals. 1u PRBC transfused. 250cc albumin and 500cc NS bolus. Plan for trach with ENT .  : ANA LILIA overnight. Neuro exam stable. Off levo since 12am. POD 0 trach with ENT. Increased Lantus to 20u 2/2 high FS. +RT 2/2 multiple loose stools. Nephrology consulted 2/2 urine appearance, NTD from their standpoint. S/p 2u platelets and 1u PRBCs.  : High residuals of TF, reglan 10IV q8h started. Neuro exam stable. Duonebs, 3% inhalation, mucomyst standing for secretions. Ordered repeat hemolytic studies. DC'd cental line. Holding TF for high residuals. ABD XR w/ stool burden. SBP in 100s, MAP in the low 60s. Given 250cc of albumin, started midodrine 15q8. Given 500c bolus. RT d/c'd.  : ANA LILIA o/n. Metamucil added for diarhea. Tube feeds remain paused for high residuals. Metamucil d/c'd. Trickle feeds resumed.  Platelet antibody test negative. CTH stable. POCUS w/ b-lines. Given 10 IV lasix. s/p albumin 250 cc for hypotension.   : Precedex started for increased alertness and tachypnea. Levo gtt restarted for MAP goal > 65. D/c lexapro. Florinef 0.2mg QD, Na goal 135-145. Advanced TF today, assess for intolerance. Goal to taper levo to off. EEG placed, slowing left hemispheric region no seizures.changed lantus to 10   : switched to full vent support for tachypnea, spiked a fever, pancultured. Start 3% @30cc/hr. Dc'd eeg, negative for seizures. Holding TF 2/2 high residual and poss aspiration, increased NS to maintenace dose. Started reglan 10q8 for abdominal distention. Pressor requirements increased heavily, got 2L bolus wide open. Started Vanc/zosyn empirically. CXR complete. Levo and [place] gtt. Colquitt sump placed for GI decompresion and emesis, abd XR showing possible ileus vs SBO. Dc'd lantus. Given aother 1L bolus.   : Pt requiring 3 vaspressors around 8pm MAPs in 40s. Pt noted to be desaturating to 86 despite FiO2 100%, given total 15mg nimbex. ABG with P/F ratio 61 and chest xray consistent with ARDS. Nimbex drip, fentanyl drip, and propofol drip started. FiO2 decreased to 60. Per ID Dr. Laughlin, recommended to give tobramycin x1 dose, extra dose vanc 500mg and increase maintenance to 1.5g bid. Blood cultures growing gram positive rods and gram positive cocci in chains and pairs. INR elevated 1.9, given vitamin k 10mg and plan to continue 5mg daily for 3 days.    Vital Signs Last 24 Hrs  T(C): 37.4 (09 Aug 2023 00:54), Max: 38.3 (08 Aug 2023 05:49)  T(F): 99.3 (09 Aug 2023 00:54), Max: 100.9 (08 Aug 2023 05:49)  HR: 95 (09 Aug 2023 00:00) (90 - 118)  BP: 115/63 (09 Aug 2023 00:00) (80/52 - 157/69)  BP(mean): 84 (09 Aug 2023 00:00) (62 - 101)  RR: 17 (08 Aug 2023 23:00) (17 - 28)  SpO2: 96% (09 Aug 2023 00:00) (89% - 100%)    Parameters below as of 09 Aug 2023 00:00  Patient On (Oxygen Delivery Method): ventilator    O2 Concentration (%): 60    I&O's Summary    07 Aug 2023 07:01  -  08 Aug 2023 07:00  --------------------------------------------------------  IN: 1424.3 mL / OUT: 2275 mL / NET: -850.7 mL    08 Aug 2023 07:01  -  09 Aug 2023 00:55  --------------------------------------------------------  IN: 3880.7 mL / OUT: 2165 mL / NET: 1715.7 mL      PHYSICAL EXAM:  General: Pt sitting up in bed, on full vent support, paralyzed on nimbex  HEENT: PERRL 3mm  Cardiovascular: RRR, normal S1 and S2   Respiratory: non-labored breathing, symmetric chest rise, on trach to full vent  GI: abd soft, ND   Neuro: A&O x 0, not following commands, limited motor exam as patient is currently paralyzed on nimbex  Wounds: tracheostomy site dressing dry, sutures in place    TUBES/LINES:  [X] CVC  [X] A-line  [] Lumbar Drain  [] Ventriculostomy  [] Other    DIET:  [] NPO  [] Mechanical  [] Tube feeds-- HELD     LABS:                        8.9    3.67  )-----------( 70       ( 08 Aug 2023 20:08 )             26.5     08-08    135  |  109<H>  |  11  ----------------------------<  112<H>  3.2<L>   |  17<L>  |  0.44<L>    Ca    6.9<L>      08 Aug 2023 20:08  Phos  2.8       Mg     1.5         TPro  4.0<L>  /  Alb  1.8<L>  /  TBili  2.2<H>  /  DBili  x   /  AST  22  /  ALT  15  /  AlkPhos  69  08-08    PT/INR - ( 08 Aug 2023 20:08 )   PT: 22.2 sec;   INR: 1.99          PTT - ( 08 Aug 2023 20:08 )  PTT:35.0 sec  Urinalysis Basic - ( 08 Aug 2023 20:08 )    Color: x / Appearance: x / SG: x / pH: x  Gluc: 112 mg/dL / Ketone: x  / Bili: x / Urobili: x   Blood: x / Protein: x / Nitrite: x   Leuk Esterase: x / RBC: x / WBC x   Sq Epi: x / Non Sq Epi: x / Bacteria: x      CARDIAC MARKERS ( 08 Aug 2023 20:10 )  x     / x     / 52 U/L / x     / 1.0 ng/mL      CAPILLARY BLOOD GLUCOSE      POCT Blood Glucose.: 87 mg/dL (08 Aug 2023 23:58)  POCT Blood Glucose.: 107 mg/dL (08 Aug 2023 16:25)  POCT Blood Glucose.: 117 mg/dL (08 Aug 2023 11:49)  POCT Blood Glucose.: 95 mg/dL (08 Aug 2023 05:37)      Drug Levels: [] N/A    CSF Analysis: [] N/A      Allergies    amoxicillin (Rash)  ure-Na (Rash; Fever; Hypotension)    Intolerances      MEDICATIONS:  Antibiotics:    Neuro:  acetaminophen     Tablet .. 650 milliGRAM(s) Oral every 6 hours PRN  cisatracurium Infusion 3 MICROgram(s)/kG/Min IV Continuous <Continuous>  fentaNYL   Infusion. 0.5 MICROgram(s)/kG/Hr IV Continuous <Continuous>  lacosamide IVPB 50 milliGRAM(s) IV Intermittent every 12 hours  metoclopramide Injectable 10 milliGRAM(s) IV Push every 8 hours  propofol Infusion 50 MICROgram(s)/kG/Min IV Continuous <Continuous>    Anticoagulation:    OTHER:  acetylcysteine 10%  Inhalation 4 milliLiter(s) Inhalation every 6 hours  albuterol/ipratropium for Nebulization 3 milliLiter(s) Nebulizer every 6 hours  atorvastatin 20 milliGRAM(s) Oral at bedtime  chlorhexidine 0.12% Liquid 15 milliLiter(s) Oral Mucosa every 12 hours  chlorhexidine 2% Cloths 1 Application(s) Topical <User Schedule>  chlorhexidine 4% Liquid 1 Application(s) Topical <User Schedule>  dexAMETHasone  Injectable 2 milliGRAM(s) IV Push every 12 hours  fludroCORTISONE 0.2 milliGRAM(s) Oral daily  insulin lispro (ADMELOG) corrective regimen sliding scale   SubCutaneous every 6 hours  lactobacillus acidophilus 1 Tablet(s) Oral daily  midodrine 15 milliGRAM(s) Oral every 8 hours  norepinephrine Infusion 0.05 MICROgram(s)/kG/Min IV Continuous <Continuous>  pantoprazole  Injectable 40 milliGRAM(s) IV Push every 12 hours  petrolatum Ophthalmic Ointment 1 Application(s) Both EYES three times a day  phenylephrine    Infusion 0.1 MICROgram(s)/kG/Min IV Continuous <Continuous>  simethicone 80 milliGRAM(s) Chew every 6 hours  sodium chloride 3%  Inhalation 4 milliLiter(s) Inhalation every 6 hours  sucralfate suspension 1 Gram(s) Oral every 6 hours  vasopressin Infusion 0.04 Unit(s)/Min IV Continuous <Continuous>    IVF:  multivitamin 1 Tablet(s) Oral daily  potassium chloride  20 mEq/100 mL IVPB 20 milliEquivalent(s) IV Intermittent every 2 hours  sodium chloride 3 Gram(s) Oral every 6 hours  sodium chloride 3%. 500 milliLiter(s) IV Continuous <Continuous>    CULTURES:  Culture Results:   Culture in progress ( @ 03:35)  Culture Results:   Culture in progress ( @ 03:35)    RADIOLOGY & ADDITIONAL TESTS:      ASSESSMENT:  67 YO male PMH T2DM, HLD, JAGDISH, tracheal stenosis s/p tracheostomy (s/p closure); glioblastoma s/p resection 2022, and [place] Avastin treatment 3/2023 adm with aphasia, N/V and severe hypoNa (118), imaging showing disesase progression, Hospital course complicated by sepsis secondary to bacteremia, decompensated, intubated, started on antibiotics and upgrated to ICU on . S/p trach with ENT (23). Now with recurrent septic shock and bacteremia, hypoxic respiratory failure, ARDS    PLAN:  Neuro:  - pupil checks q1h/vitals q1hrs   - CTH : Interval development of a 13 mm hyperdense nodule along the inferior margin of the resection cavity which may represent progression of disease with hemorrhage. Repeat CTH stable, CTH  stable   - CTA : negative, CTA  stable  - CTP  stable.   - fentanyl drip, propofol drip for RASS -3 to -5, nimbex drip train   - vimpat 50mg BID (switched from keppra /2 thrombocytopenia) for seizure tx  - Decadron 2mg BID  - c/w home Lexapro 5mg (home dose 5mg)  - home ASA 81 held i/s/o thrombocytopenia   - MRI brain  suspicious for recurrent tumor and hydro   - eeg dc'd , negative for seizures    Pulm:  - Trach (6 Shiley) to full vent support 60/400/14/10  - duonebs/mucomyst/3% inhalation q6  - ARDS likely 2/2 aspiration PNA, trend P/F ratio, daily CXR    Cardio:  - MAP goal >65, levo gtt, vasopressin gtt, midodrine 15q8   -  outpatient echo EF 65%  - echo : EF 60-65%  -  QTc 503    GI:  - Colquitt sump to LIWS for ileus, NPO   - +RT  2/2 multiple loose stools, d/c'd   - protonix BID and carafate 1g q6h for upper GI bleed  - GI following, s/p EGD : severe circumferential esophagitis, small ulcer to anterior stomach  - reglan 10 IV q8h for GI dysmotility    Renal/:  - Hyponatremic (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 as well as SSRI use), s/p tolvaptan ; currently on NaCl tabs 3g q6, Florinef 0.2 BID, 3% @ 30cc/hr  - Campuzano replaced 8/3 by urology for difficult straight cath    Endo:  - A1c 5.7  - ISS  - h/o T2DM: home Januvia held  - h/o HLD: c/w Atorvastatin 20mg    Heme:  - SCDs for DVT ppx, SQL and Aspirin 81 held for thrombocytopenia   - INR 1.9 elevated, trend. Vit k 5mg x3d  - heme consulted for thrombocytopenia- w/u sent, likely chronic thrombocytopenia d/t chemotherapy agent in past   - Per heme transfuse 1U plt if bleeding and platelet count <50k, if febrile and platelet count <20k. Hold transfusing platelets until tomorrow before procedure.   - 1u PRBC, 1U PLT , 2u PRBC, 1u PLT , 1u PRBC 8/3, 2u PLT , 1u PRBC   - LE dopplers  negative  - LUE doppler : L superficial cephalic vein thrombophlebitis  - CTA PE protocol for tachycardia: negative     ID:  - Pancultured , bcx growing K. pneumoniae, sputum cx K. pneumoniae, f/u blood cx   - meropenem 1g q8hrs (-), changed to Ceftriaxone/Flagyl per ID recs (-)  - CT CAP : Wall thickening in the colon suggesting an infectious or inflammatory colitis, Suspected small duodenal ulcer with surrounding edema in, Distended gallbladder w/o cholecystitis. Mild ascites. Bibasilar pneumonia L>R.  - Febrile, leukocytosis and tachycardia with elevated lactic acid on   - vancomycin/meropenem empirically (-), d/c per ID and repeat blood cx , NGTD  - Started Vanc/Zosyn started empirically (-), s/p tobramycin IV x1  per ID    Dispo:   - NSICU status, full code, AR    D/w Dr. Dorado and Dr. Hendrix

## 2023-08-09 NOTE — PROGRESS NOTE ADULT - ASSESSMENT
68y/M with  severe hyponatremia  GBM brain compression cerebral edema   Diabetes Mellitus Hypertension dyslipidemia  iron deficiency anemia  nephrolithiasis  thrombocytopenia  BPH    PLAN:   NEURO: neurochecks q4h, VS q1h, PRN pain meds with acetaminophen, PRN fentanyl  GBM - no neurosurgical plans, palliative care, continue steroids  MRI   encephalopathy: sepsis, metabolic, drug, seizures; d/c escitalopram EEG ammonium levels, check lacosamide  REHAB:  physical therapy evaluation and management    EARLY MOB:   OOB to chair, ambulate    PULM:  continue CPAP 10/5, pulmo toilette, nebs, reglan for hiccups  CARDIO:  MAP 65-70, taper levo   ENDO:  Blood sugar goals 140-180 mg/dL, continue insulin sliding scale, insulin glargine keep at 20 units HS unless fingersticks drop <150  GI:  cont sucralfate, PPI; AXR  DIET: hold tube feeds for now  RENAL: Na goal 135-145, cont fludrocortisone, salt tabs, cont 3% at 30 cc/hr, recheck BMp this afternoon, NS@40cc/hr  HEM/ONC: Hb stable  VTE Prophylaxis: SCDs, no DVT chemoprophylaxis for now as patient is high risk for bleed (thrombocytopenia requiring platelet transfusion)  ID: afebrile, no leukocytosis, PCT, new fever, f/u cultures, no ABx  Social: GOC discussion with family, son and daughter, son-in-law    Active issues:  What's keeping patient in the ICU? [facility] vent  What is this patient's dispo plan?     ATTENDING ATTESTATION:  I was physically present for the key portions of the evaluation and management (E/M) service provided.  I agree with the above history, physical and plan, which I have reviewed and edited where appropriate.    Patient at high risk for neurological deterioration or death due to:  ICU delirium, aspiration PNA, DVT / PE.  Critical care time:  I have personally provided 60 minutes of critical care time, excluding time spent on separate procedures.      Plan discussed with RN, house staff. 68y/M with  severe hyponatremia  GBM brain compression cerebral edema   Diabetes Mellitus Hypertension dyslipidemia  iron deficiency anemia  nephrolithiasis  thrombocytopenia  BPH    PLAN:   NEURO: comachecks q4h, VS q1h, fentanyl and propfol drip paralyzed, RASS goal -3 to -5  GBM - no neurosurgical plans, palliative care, continue steroids  defer MRI   REHAB:  physical therapy evaluation and management    EARLY MOB:   HOBup vbed rest    PULM:  continue full vent support, cont nebs, decrrease FiO2 to 40%, PEEP to 8, ABG after 1 hour  CARDIO:  MAP 65-70, taper levo - switch to double strength, vasopressin  ENDO:  Blood sugar goals 140-180 mg/dL, continue insulin sliding scale, insulin glargine keep at 20 units HS unless fingersticks drop <150  GI:  GI Bleed - cont BID PPI, sucralfate  DIET: hold tube feeds for now  RENAL: Na goal 135-145, repeat BMP this afternoon  HEM/ONC: increase Vit K to 10 OD x 3 days; f/u repeat CBC  VTE Prophylaxis: SCDs, no DVT chemoprophylaxis for now as patient is high risk for bleed (thrombocytopenia requiring platelet transfusion)  ID: severe septic shock pulmo (pseudomonas, enterococcus and acinetobacter) cont piperacillin/tazobactam, vanc trough, given 1 dose of tobramycin; ID on board, blood CS until culture NEG; f/u sensitivities of 3 bacteria  Social: [facility] discussion with family, son and daughter, son-in-law    Active issues:  What's keeping patient in the ICU? [facility] vent  What is this patient's dispo plan?     ATTENDING ATTESTATION:  I was physically present for the key portions of the evaluation and management (E/M) service provided.  I agree with the above history, physical and plan, which I have reviewed and edited where appropriate.    Patient at high risk for neurological deterioration or death due to:  ICU delirium, aspiration PNA, DVT / PE.  Critical care time:  I have personally provided 60 minutes of critical care time, excluding time spent on separate procedures.      Plan discussed with RN, house staff.

## 2023-08-10 NOTE — PROGRESS NOTE ADULT - ASSESSMENT
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  He was medically managed.  Course c/b septic shock due to K.pneumo bacteremia s/p treatment with CTX and Flagyl. Patient developed septic shock with ARDS likely 2/2 aspiration PNA found to have PsA, E. faecalis, and Clostridium perfringens. Surveillance bcx NGTD. Spoke with micro lab today- PsA is S to Zosyn, sensitivities will be released this afternoon along with sensitivities for E. faecalis. Sensitivities for clostridium will be released tomorrow     Suggest:  -f/u surveillance blood cultures. No need to send more surveillance cultures   -Obtain CT A/P with contrast if/when stable   -can defer redosing Tobramycin   -Continue Zosyn 4.5 g IV q8h via EI   -f/u GOC     Team 2 will follow you  Case d/w primary team.  Final recommendation pending attending note.    Kelsey Jolley, Infectious Diseases PA  Please reach out for any questions 9 am-5pm. For evenings and weekends, please call the ID physician on call.  Work cell: [phone] 68M h/o GBM s/p resection 1/27/2022 and Avastin 3/2023, T2DM, HLD, trachal stenosis s/p tracheostomy which was closed p/w AMS, found to have severe hyponatremia.  He was medically managed.  Course c/b septic shock due to K.pneumo bacteremia s/p treatment with CTX and Flagyl. Patient developed septic shock with ARDS likely 2/2 aspiration PNA found to have PsA, E. faecalis, and Clostridium perfringens. Surveillance bcx NGTD. Spoke with micro lab today- PsA is S to Zosyn, sensitivities will be released this afternoon along with sensitivities for E. faecalis. Sensitivities for clostridium will be released tomorrow. Drop in platelets 18(46) likely DIC i/s/o sepsis- not likely zosyn induced thrombocytopenia     Suggest:  -f/u surveillance blood cultures. No need to send more surveillance cultures   -Obtain CT A/P with contrast if/when stable   -can defer redosing Tobramycin   -Continue Zosyn 4.5 g IV q8h via EI   -f/u GOC     Team 2 will follow you  Case d/w primary team.  Final recommendation pending attending note.    Kelsey Jolley, Infectious Diseases PA  Please reach out for any questions 9 am-5pm. For evenings and weekends, please call the ID physician on call.  Work cell: [phone]

## 2023-08-10 NOTE — ADVANCED PRACTICE NURSE CONSULT - REASON FOR CONSULT
[facility] nurse consult to assess pressure injuries. The patient is a 69YO male with a past medical history of type 2 diabetes, hyperlipidemia, iron deficiency anemia, tracheal stenosis s/p tracheostomy (which was closed by ENT 7 days ago); glioblastoma s/p resection 1/27/2022, and Avastin treatment 3/2023 presenting with altered mental status and weakness x2 days.

## 2023-08-10 NOTE — PROGRESS NOTE ADULT - SUBJECTIVE AND OBJECTIVE BOX
SUBJECTIVE AND OBJECTIVE:  Indication for Geriatrics and Palliative Care Services: Complex medical decision making    OVERNIGHT EVENTS: Developed ARDS, desatting yesterday with hypotension. Now on 10 drips, including paralytics. Platelets dropped to 18 overnight.    Allergies    amoxicillin (Rash)  ure-Na (Rash; Fever; Hypotension)    Intolerances    MEDICATIONS  (STANDING):  acetylcysteine 10%  Inhalation 4 milliLiter(s) Inhalation every 6 hours  albuterol/ipratropium for Nebulization 3 milliLiter(s) Nebulizer every 6 hours  aMIOdarone Infusion 1 mG/Min (33.3 mL/Hr) IV Continuous <Continuous>  atorvastatin 20 milliGRAM(s) Oral at bedtime  chlorhexidine 0.12% Liquid 15 milliLiter(s) Oral Mucosa every 12 hours  chlorhexidine 2% Cloths 1 Application(s) Topical <User Schedule>  chlorhexidine 4% Liquid 1 Application(s) Topical <User Schedule>  cisatracurium Infusion 3 MICROgram(s)/kG/Min (13.1 mL/Hr) IV Continuous <Continuous>  fentaNYL   Infusion. 0.5 MICROgram(s)/kG/Hr (3.63 mL/Hr) IV Continuous <Continuous>  fludroCORTISONE 0.2 milliGRAM(s) Oral daily  hydrocortisone sodium succinate Injectable 50 milliGRAM(s) IV Push every 6 hours  insulin lispro (ADMELOG) corrective regimen sliding scale   SubCutaneous every 6 hours  lacosamide IVPB 50 milliGRAM(s) IV Intermittent every 12 hours  lactobacillus acidophilus 1 Tablet(s) Oral daily  metoclopramide Injectable 10 milliGRAM(s) IV Push every 8 hours  metoprolol tartrate Injectable 5 milliGRAM(s) IV Push once  multivitamin 1 Tablet(s) Oral daily  norepinephrine Infusion 0.05 MICROgram(s)/kG/Min (3.4 mL/Hr) IV Continuous <Continuous>  pantoprazole  Injectable 40 milliGRAM(s) IV Push every 12 hours  petrolatum Ophthalmic Ointment 1 Application(s) Both EYES three times a day  phenylephrine    Infusion 0.2 MICROgram(s)/kG/Min (2.72 mL/Hr) IV Continuous <Continuous>  phytonadione  IVPB 10 milliGRAM(s) IV Intermittent daily  piperacillin/tazobactam IVPB.. 4.5 Gram(s) IV Intermittent every 8 hours  propofol Infusion 50 MICROgram(s)/kG/Min (21.8 mL/Hr) IV Continuous <Continuous>  simethicone 80 milliGRAM(s) Chew every 6 hours  sodium chloride 3 Gram(s) Oral every 6 hours  sodium chloride 3%  Inhalation 4 milliLiter(s) Inhalation every 6 hours  sodium chloride 3%. 500 milliLiter(s) (30 mL/Hr) IV Continuous <Continuous>  sucralfate suspension 1 Gram(s) Oral every 6 hours  vasopressin Infusion 0.04 Unit(s)/Min (6 mL/Hr) IV Continuous <Continuous>    MEDICATIONS  (PRN):  acetaminophen     Tablet .. 650 milliGRAM(s) Oral every 6 hours PRN Temp greater or equal to 38C (100.4F), Mild Pain (1 - 3)  fentaNYL    Injectable 50 MICROGram(s) IV Push every 2 hours PRN agitation/tachycardia/vent desynchrony  sodium chloride 0.9% lock flush 10 milliLiter(s) IV Push every 1 hour PRN Pre/post blood products, medications, blood draw, and to maintain line patency  sodium chloride 0.9% lock flush 10 milliLiter(s) IV Push every 1 hour PRN Pre/post blood products, medications, blood draw, and to maintain line patency      ITEMS UNCHECKED ARE NOT PRESENT    PRESENT SYMPTOMS: [X]Unable to self-report  Source if other than patient:  [X]Family   [ ]Team     Pain:  [ ]yes [X]no  QOL impact -   Location -                    Aggravating factors -  Quality -  Radiation -  Timing-  Severity (0-10 scale):  Minimal acceptable level (0-10 scale):     Dyspnea:                           [ ]Mild [ ]Moderate [ ]Severe  Anxiety:                             [ ]Mild [ ]Moderate [ ]Severe  Fatigue:                             [ ]Mild [ ]Moderate [ ]Severe  Nausea:                             [ ]Mild [ ]Moderate [ ]Severe  Loss of appetite:              [ ]Mild [ ]Moderate [ ]Severe  Constipation:                    [ ]Mild [ ]Moderate [ ]Severe    Other Symptoms:  [X]All other review of systems negative     Palliative Performance Status Version 2:         10%      http://npcrc.org/files/news/palliative_performance_scale_ppsv2.pdf    PHYSICAL EXAM:  Vital Signs Last 24 Hrs  T(C): 37.7 (10 Aug 2023 14:27), Max: 38.6 (09 Aug 2023 16:00)  T(F): 99.8 (10 Aug 2023 14:27), Max: 101.5 (09 Aug 2023 16:00)  HR: 83 (10 Aug 2023 15:00) (83 - 142)  BP: 118/70 (10 Aug 2023 11:05) (115/59 - 121/57)  BP(mean): 87 (10 Aug 2023 11:05) (80 - 87)  RR: 22 (10 Aug 2023 15:00) (22 - 23)  SpO2: 100% (10 Aug 2023 15:00) (96% - 100%)    Parameters below as of 10 Aug 2023 15:00  Patient On (Oxygen Delivery Method): ventilator    O2 Concentration (%): 40 I&O's Summary    09 Aug 2023 07:01  -  10 Aug 2023 07:00  --------------------------------------------------------  IN: 4107.2 mL / OUT: 2290 mL / NET: 1817.2 mL    10 Aug 2023 07:01  -  10 Aug 2023 15:05  --------------------------------------------------------  IN: 1356.8 mL / OUT: 715 mL / NET: 641.8 mL       GENERAL: [ ]Cachexia    [ ]Alert  [ ]Oriented x   [ ]Lethargic  [X]Unarousable  [ ]Verbal  [ ]Non-Verbal  Behavioral:   [ ]Anxiety  [ ]Delirium [ ]Agitation [X]Unresponsive  HEENT:  [ ]Normal   [ ]Dry mouth   [X]Trach  [ ]Oral lesions  PULMONARY:   [ ]Clear [ ]Tachypnea  [ ]Audible excessive secretions   [ ]Rhonchi        [ ]Right [ ]Left [ ]Bilateral  [X]Crackles        [ ]Right [ ]Left [ ]Bilateral  [ ]Wheezing     [ ]Right [ ]Left [ ]Bilateral  [ ]Diminished BS [ ] Right [ ]Left [ ]Bilateral  CARDIOVASCULAR:    [ ]Regular [X]Irregular [ ]Tachy  [ ]Yomi [ ]Murmur [ ]Other  GASTROINTESTINAL:  [X]Soft  [ ]Distended   [ ]+BS  [ ]Non tender [ ]Tender  [ ]Other [ ]PEG [X]OGT to suction   Last BM:   GENITOURINARY:  [ ]Normal [ ]Incontinent   [ ]Oliguria/Anuria   [X]Campuzano  MUSCULOSKELETAL:   [ ]Normal   [ ]Weakness  [X]Bed/Wheelchair bound [ ]Edema  NEUROLOGIC:   [ ]No focal deficits  [ ] Cognitive impairment  [ ] Dysphagia [ ]Dysarthria [ ] Paresis [X]Unarousable  SKIN:   [X]Normal  [ ]Rash  [ ]Other  [ ]Pressure ulcer(s) [ ]y [ ]n present on admission    CRITICAL CARE:  [ ]Shock Present  [ ]Septic [ ]Cardiogenic [ ]Neurologic [ ]Hypovolemic  [ ]Vasopressors [ ]Inotropes  [ ]Respiratory failure present [ ]Mechanical Ventilation [ ]Non-invasive ventilatory support [ ]High-Flow Mode: AC/ CMV (Assist Control/ Continuous Mandatory Ventilation), RR (machine): 22, TV (machine): 410, FiO2: 40, PEEP: 8, ITime: 1, MAP: 12, PIP: 20  [ ]Acute  [ ]Chronic [ ]Hypoxic  [ ]Hypercarbic [ ]Other  [ ]Other organ failure     LABS:                        7.9    11.91 )-----------( 50       ( 10 Aug 2023 11:22 )             24.4   08-10    136  |  109<H>  |  16  ----------------------------<  207<H>  4.0   |  18<L>  |  0.48<L>    Ca    7.2<L>      10 Aug 2023 04:14  Phos  2.8     08-10  Mg     1.7     08-10    TPro  4.1<L>  /  Alb  1.7<L>  /  TBili  2.3<H>  /  DBili  x   /  AST  42<H>  /  ALT  22  /  AlkPhos  73  08-09    PT/INR - ( 10 Aug 2023 04:14 )   PT: 19.3 sec;   INR: 1.72     PTT - ( 10 Aug 2023 04:14 )  PTT:41.0 sec    Urinalysis Basic - ( 10 Aug 2023 04:14 )    Color: x / Appearance: x / SG: x / pH: x  Gluc: 207 mg/dL / Ketone: x  / Bili: x / Urobili: x   Blood: x / Protein: x / Nitrite: x   Leuk Esterase: x / RBC: x / WBC x   Sq Epi: x / Non Sq Epi: x / Bacteria: x      RADIOLOGY & ADDITIONAL STUDIES:    Protein Calorie Malnutrition Present: [ ]mild [ ]moderate [ ]severe [ ]underweight [ ]morbid obesity  https://www.andeal.org/vault/2440/web/files/ONC/Table_Clinical%20Characteristics%20to%20Document%20Malnutrition-White%20JV%20et%20al%712566.pdf    Height (cm): 170.2 (08-04-23 @ 11:52), 167.6 (04-18-23 @ 14:10), 170.2 (03-25-23 @ 12:45)  Weight (kg): 72.6 (08-04-23 @ 11:52), 70.8 (04-18-23 @ 14:10), 71.4 (03-25-23 @ 12:45)  BMI (kg/m2): 25.1 (08-04-23 @ 11:52), 25.2 (04-18-23 @ 14:10), 24.6 (03-25-23 @ 12:45)    [X]PPSV2 < or = 30%  [ ]significant weight loss [ ]poor nutritional intake [ ]anasarca [ ]Artificial Nutrition    REFERRALS:   [ ]Chaplaincy  [ ]Hospice  [ ]Child Life  [X]Social Work [ ]Patient and Family Support [X]Case management [ ]Holistic Therapy [ ] Music therapy  [ ] Massage Therapy    DISCUSSION OF CASE: Family - obtained additional history and to provide emotional support;  Primary team - discussed plan of care

## 2023-08-10 NOTE — CONSULT NOTE ADULT - REASON FOR ADMISSION
Altered Mental Status & Weakness x2 days

## 2023-08-10 NOTE — PROGRESS NOTE ADULT - ASSESSMENT
68y/M with  severe hyponatremia  GBM brain compression cerebral edema   Diabetes Mellitus Hypertension dyslipidemia  iron deficiency anemia  nephrolithiasis  thrombocytopenia  BPH    PLAN:   NEURO: comachecks q4h, VS q1h, fentanyl and propfol drip paralyzed, RASS goal -3 to -5  GBM - no neurosurgical plans, palliative care, continue steroids  defer MRI   REHAB:  physical therapy evaluation and management    EARLY MOB:   HOBup vbed rest    PULM:  continue full vent support, cont nebs, decrrease FiO2 to 40%, PEEP to 8, ABG after 1 hour  CARDIO:  MAP 65-70, taper levo - switch to double strength, vasopressin  ENDO:  Blood sugar goals 140-180 mg/dL, continue insulin sliding scale, insulin glargine keep at 20 units HS unless fingersticks drop <150  GI:  GI Bleed - cont BID PPI, sucralfate  DIET: hold tube feeds for now  RENAL: Na goal 135-145, repeat BMP this afternoon  HEM/ONC: increase Vit K to 10 OD x 3 days; f/u repeat CBC  VTE Prophylaxis: SCDs, no DVT chemoprophylaxis for now as patient is high risk for bleed (thrombocytopenia requiring platelet transfusion)  ID: severe septic shock pulmo (pseudomonas, enterococcus and acinetobacter) cont piperacillin/tazobactam, vanc trough, given 1 dose of tobramycin; ID on board, blood CS until culture NEG; f/u sensitivities of 3 bacteria  Social: [facility] discussion with family, son and daughter, son-in-law    Active issues:  What's keeping patient in the ICU? [facility] vent  What is this patient's dispo plan?     ATTENDING ATTESTATION:  I was physically present for the key portions of the evaluation and management (E/M) service provided.  I agree with the above history, physical and plan, which I have reviewed and edited where appropriate.    Patient at high risk for neurological deterioration or death due to:  ICU delirium, aspiration PNA, DVT / PE.  Critical care time:  I have personally provided 60 minutes of critical care time, excluding time spent on separate procedures.      Plan discussed with RN, house staff. 68y/M with  severe hyponatremia  GBM brain compression cerebral edema   Diabetes Mellitus Hypertension dyslipidemia  iron deficiency anemia  nephrolithiasis  thrombocytopenia  BPH    PLAN:   NEURO: comachecks q4h, VS q1h, fentanyl and propfol drip paralyzed, RASS goal -3 to -5  GBM - no neurosurgical plans, palliative care, continue steroids  defer MRI   REHAB:  physical therapy evaluation and management    EARLY MOB:   HOB up bed rest    PULM:  continue full vent support, cont nebs, FiO2 to 40%, PEEP to 5, ABG after 1 hour  CARDIO:  MAP 65-70, taper levo double strength, vasopressin  ENDO:  Blood sugar goals 140-180 mg/dL, continue insulin sliding scale,   GI:  GI Bleed - cont BID PPI, sucralfate  DIET: hold tube feeds for now  RENAL: Na goal 135-145, repeat BMP this afternoon, 3% @30  HEM/ONC: increase Vit K to 10 OD x 3 days; f/u repeat CBC; platelet transfusion x1, goal 50  VTE Prophylaxis: SCDs, no DVT chemoprophylaxis for now as patient is high risk for bleed (thrombocytopenia requiring platelet transfusion)  ID: severe septic shock pulmo (pseudomonas, enterococcus and acinetobacter) cont piperacillin/tazobactam, vanc trough, given 1 dose of tobramycin; ID on board, blood CS until culture NEG; f/u sensitivities of 3 bacteria  Social: C discussion with family, son and daughter, son-in-law    Active issues:  What's keeping patient in the ICU? [facility] vent  What is this patient's dispo plan?     ATTENDING ATTESTATION:  I was physically present for the key portions of the evaluation and management (E/M) service provided.  I agree with the above history, physical and plan, which I have reviewed and edited where appropriate.    Patient at high risk for neurological deterioration or death due to:  ICU delirium, aspiration PNA, DVT / PE.  Critical care time:  I have personally provided 60 minutes of critical care time, excluding time spent on separate procedures.      Plan discussed with RN, house staff.

## 2023-08-10 NOTE — PROVIDER CONTACT NOTE (OTHER) - SITUATION
Vanc trough returned 5.6
Pt given Tylenol 650mg & temp rising to 101.1 via rectal probe
Pt breathing over vent & using abdominal muscles. Pt given 25mcg Fent push for vent synchrony
Pt still hypotensive after bolus
T 100.5 rectal probe
Tube feeding residuals > 500cc
pt could no tolerate PO meds
Pt UO dropped from 100cc to 20cc
pt becoming hypotensive, while titrating up on levophed. pt became tachypneic.
Pt has been constantly redirected and, repositioned for comfort. But still attempts to get OOB. Now patient found getting up with EEG electrodes off.
Pt is hypotensive
patient trach sutures too tight, risk for skin breakdown
Microbiology called to say aerobic cultures came back positive.
SBP in the 160's
Follow up on Neuro assessment and U/o dark tea color observed.  Requesting continuous drips or a bolus for the patient. Pt on fluid restriction of 1.5L a day
Pt had burst of PSVT , for 13beats.
Right arm 20G no longer working. Pt restless and constantly trying to climb OOB and IV came out of place.

## 2023-08-10 NOTE — PROGRESS NOTE ADULT - SUBJECTIVE AND OBJECTIVE BOX
HPI:  Osiel Norwood is a 67YO male with a past medical history of type 2 diabetes, hyperlipidemia, iron deficiency anemia, tracheal stenosis s/p tracheostomy (which was closed by ENT 7 days ago); glioblastoma s/p resection 2022, and Avastin treatment 3/2023 presenting with altered mental status and weakness x2 days. Per family, they last saw him last night around 6pm when he was at baseline. Patient has intermittent expressive aphasia but is getting worse and now is persistent, and has intermittent nausea and vomiting since last night. Patient was supposed to receive an outpatient brain MRI on 2023.  Stroke code performed in  is negative for CVA. Head CT shows new hyperdensity in frontal parietal - surgical site. MRI is ordered to rule out tumor recurrence.   Per daughter at bedside, Entresto and Eliquis were stopped per his PC - cardiologist.      (2023 13:10)    Hospital Course:  : Admitted. Na 115 in ED with repeat 118, started 3% at 30cc/hr, and salt tabs 3q6, stability CTH in ED showing Interval development of a 13 mm hyperdense nodule along the   inferior margin of the resection cavity which may represent progression   of disease with hemorrhage, repeat CTH stable, urine studies ordered  : Neuro stable, 12AM BMP Na114 3% increased to 40cc/hr, urine studies, pancx to r/o infection since pt is immunocompromised. Changed pantoprazole to sucralfate for GI ppx d/t thrombocytopenia. LE dopplers negative. DC'd 3%. ENT consulted to assess stoma, recommend follow up upon discharge with ENT, Repeat sodium 122. Restarted 3% at 30.  : ANA LILIA o/n neuro stable. remains on 3%@30cc/hr. Started florinef, increased 3% to 50cc/hr. Increased 3% to 75cc/hr.    7/10: continued current 3% rate o/n. Na 127 --> 125, cont 3% @75cc/hr, f/u repeat Na this evening, likely stepdown tomorrow. Pend dispo home with home PT/OT when able. Heme consulted for thrombocytopenia  : ANA LILIA o/n. Remains on 3% @75cc/hr. Decreased 3% to 50cc/hr. Started 1.2L fluid restriction. Repeat Na corrected 128  : ANA LILIA overnight, remains on 3%, SDU from ICU  : ANA LILIA overnight, neuro stable, on 3%@50, Am sodium 137, decreased 3% to 25cc/hr, repeat Na 138, decreased to 15cc/hr.  : ANA LILIA overnight, neuro stable, remains on 3%@15cc/hr  7/15: ANA LILIA overnight. Neuro exam stable. Pt remains on 3% saline.Sodium 134 this AM remains on 3%@25. Per nephrology recs hypertonic Na d'ceed, Na tabs 2q6, URENA 15g BID, cont florinef/ fluid restriction.Per nephrology Na >130 ok when ready for d/c   : ANA LILIA overnight, hypertonics d/c now on urea powder/1L fluid restriction/florinef and salt tabs per renal, hydrocortisone cream ordered for rash on back, rec for Home PT  : ANA LILIA overnight, following Na, renal following, ENT saw for hoarse voice rec followup with CT surgery for possible dilation, pending Home PT. Patient developed sudden onset severe headache. Hyperventilating with increased work of breathing. Wheezing in all lung fields to auscultation. Neuro intact on exam. Duoneb x1 ordered. Dilaudid 0.25 IV given for pain control. Subsequently patient developed nausea with multiple episodes of vomiting. Hypertensive with SBP in the 190s. Given hydralazine 10mg IVP, Zofran. Stroke code and rapid response called. STAT labs sent. CTH/CTA/CTP completed. Tachycardic to the 180s, consistent with SVT, remained hypertensive. Given 10 of labetalol, SVT broke. Transferred to Telemetry. WBC 23.43, lactate 8.5. Given 1L fluid bolus. Pan culture sent. Started empirically on Vanc/Meropenem.   : Put on eeg. Voiding while retaining urine, SC for 500cc. EEG +spikes, epilepsy consulted. CTA chest and CT A/P pending. Keppra increased 1g BID.   : Cont EEG, trend Na, possible NGT today if not able to tolerate PO. Blood cx growing GS + cocci in clusters, f/u MRSA swab and pan cx, cont meropenem and vancomycin. Vanc trough in am. F/u urine studies.   : Off EEG, neuro exam improved. ID following for concern for sepsis, likely aerobic blood cx bottle staph epi contaminant, cont monitoring off of abx, possible drug reaction as cause. F/u surveillance blood cx. Hyponatremia, repeat Na 129 overnight from 130, cont current regimen and f/u am labs and nephrology recs. AM na 129 continuing fluid restriction, nephro recommending restarting urena, but holding due to possible drug reaction previously.   : neurologically stable, c/w fluid restriction, f/u AM Na, urine osm, urine Na, cortisol. Given 15mg of tolvaptan. Fluid restriction, florinef d/c'd per nephro recs. Held salt tabs for today. 6pm BMP Na 122, 10pm BMP Na 131, urine osm 141  : ANA LILIA ovn, neuro stable. AM Na 133. Restarted salt tabs 3q6 and 1L fluid restriction. Lantus 10u at bedtime added. 9:30pm BMP per nephro Na 138  : ANA LILIA ovn, neuro stable. Na stable, discussed with nephrology can cont tolvaptan 15mg daily PRN for hyponatremia, only needed for hypoNa, can discontinue fluid restriction. Pt evaluated at bedside after nurse noted patient to have some expressive aphasia and perseverating; patient oriented to self, unable to say hospital or year, following all commands and DUMONT 5/5 strength, no drift, no facial droop, perseverating on pt's  when asked other questions. Pt afebrile, vitals stable, cont keppra 1g BID, cont decadron 4mg BID, discussed with Dr. Dorado, defer CTH at this time and monitor clinically. . Lantus increased to 14u at bedtime.  : o/n PSVT 13 beats followed by sinus tachycardia in setting of anxiety and net negative fluid balance.  Resolved to 102 with verbal comfort and further resolved with 500 cc NS. Lantus increased to 18u qhs. Dr. Costa consulted.  : ANA LILIA overnight. Neuro stable.   : ANA LILIA overnight. Neuro exam stable. Dispo pending.  : multiple BMs o/n. Na stable. Lexapro increased to 10mg daily for worsening depression. Held bowel regimen 2/2 multiple stools.   : ANA LILIA overnight, Na 133 f/u AM labs, pending [place]. Pt has episode of sustained tachycardia, c/o feeling warm, and dizzy while working with Pt. Given 500cc NS bolus, rectal temp was 99F. Family brought in medical marijuana. Pt had rectal temp 100.9, pancultured. Given another 1L bolus and started on maintenance fluids. Rectal temp 102. Started empirically on vanc/cipro/flagyl. Upgraded to telemetry.  : In AM patient had episode of emesis and was unresponsive to sternal rub. Dr. Huggins, neurointensivist at bedside. Repeat BP 60s/40s, tachycardic to 110s. Rapid response called. Levo gtt started. Upgraded to ICU. Intubated for airway protection. Likely septic shock given GNR in blood cx growing K.pneumoniae, Given additional 1L bolus x2. Abx changed to vanc and meropenem, ID following, rec to d/c vanc, c/w meropenem [id number], FOBT positive, keppra changed to vimpat 50BID, fentanyl 25mg q4hrs prn for vent dysynchrony, home lexapro decreased to 5mg, decadron decreased to 2q12, sucralfate increased to 1q6 iso GI bleed, pending repeat CBC, CMP, ABG, DIC labs, xray abdomen to r/o obstruction, propofol gtt started for sedation, weaning off precedex gtt, GI consulted rec protonix 40bid iso possible GI bleed, plan for possible EGD, albumin 1.7, 25% albumin 50ml given, s/p 1u PRBC and PLT repeat cbc hgb 8.0 from 6.6 and PLT 88 from 55. Protonix gtt started per GI recs.   : ANA LILIA overnight. Neuro stable. Hemoglobin 6.5, platelets 47 o/n, transfused 1uPRBC, 1u platelets in AM. Repeat Hb 7.1 and platelets 81. CBC repeated and Hb 7.2 and platelets 74. Arnie test negative. 1 set of surveillance blood cultures sent. NS increased to 120cc/hr. Holding off on CT C/A/P per nephrology recs due to concern about IV contrast. Temp 100.8 F, given tylenol. Additional 1u PRBC given in evening for Hb 6.8.   : 1u prbc given in am. 6 units lantus started at bedtime. Per ID stopped meropenam and started ceftriaxone 1qd and flagyl 500 q8.  Plan for EGD tomorrow with GI. Blood and urine cx sent per ID recs.   : Tachycardic, gave 20 IV lasix. Hgb at the time stable. NS@20 for renal protection. lantus increased to 14u. Lactate normalized. Given 500cc bolus for soft pressures, restarted levo gtt temporarily, now off. Endoscopy done with GI showed severe circumferential esophagitis, small ulcer to anterior stomach. propofol changed to precedex.   : ANA LILIA overnight, remains sedated on precedex and intubated on ACVC.  Pt tolerating CPAP. Campuzano removed, failed TOV, and straight catheterized. Trickle feeds started 10cc/hr. Changed protonix gtt transitioned to 40mg IV BID per GI. ENT consulted for trach planning.  8/3: Remains on levo. Campuzano replaced by urology due to difficulty straight catheterizing. TF held for high residuals. 1u PRBC transfused. 250cc albumin and 500cc NS bolus. Plan for trach with ENT .  : ANA LILIA overnight. Neuro exam stable. Off levo since 12am. POD 0 trach with ENT. Increased Lantus to 20u 2/2 high FS. +RT 2/2 multiple loose stools. Nephrology consulted 2/2 urine appearance, NTD from their standpoint. S/p 2u platelets and 1u PRBCs.  : High residuals of TF, reglan 10IV q8h started. Neuro exam stable. Duonebs, 3% inhalation, mucomyst standing for secretions. Ordered repeat hemolytic studies. DC'd cental line. Holding TF for high residuals. ABD XR w/ stool burden. SBP in 100s, MAP in the low 60s. Given 250cc of albumin, started midodrine 15q8. Given 500c bolus. RT d/c'd.  : ANA LILIA o/n. Metamucil added for diarhea. Tube feeds remain paused for high residuals. Metamucil d/c'd. Trickle feeds resumed.  Platelet antibody test negative. CTH stable. POCUS w/ b-lines. Given 10 IV lasix. s/p albumin 250 cc for hypotension.   : Precedex started for increased alertness and tachypnea. Levo gtt restarted for MAP goal > 65. D/c lexapro. Florinef 0.2mg QD, Na goal 135-145. Advanced TF today, assess for intolerance. Goal to taper levo to off. EEG placed, slowing left hemispheric region no seizures.changed lantus to 10   : switched to full vent support for tachypnea, spiked a fever, pancultured. Start 3% @30cc/hr. Dc'd eeg, negative for seizures. Holding TF 2/2 high residual and poss aspiration, increased NS to maintenace dose. Started reglan 10q8 for abdominal distention. Pressor requirements increased heavily, got 2L bolus wide open. Started Vanc/zosyn empirically. CXR complete. Levo and [place] gtt. [place] sump placed for GI decompresion and emesis, abd XR showing possible ileus vs SBO. Dc'd lantus. Given aother 1L bolus.   : Pt requiring 3 vaspressors around 8pm MAPs in 40s. Pt noted to be desaturating to 86 despite FiO2 100%, given total 15mg nimbex. ABG with P/F ratio 61 and chest xray consistent with ARDS. Nimbex drip, fentanyl drip, and propofol drip started. FiO2 decreased to 60. Per ID Dr. Laughlin, recommended to give tobramycin x1 dose, extra dose vanc 500mg and increase maintenance to 1.5g bid. Blood cultures growing gram positive rods and gram positive cocci in chains and pairs. INR elevated 1.9, given vitamin k 10mg and plan to continue 5mg daily for 3 days. Persistent respiratory acidosis, given 1amp bicarb. Hypocalcemia, given 1g calcium gluconate given. Increased vitamin k to 10mg x 3 days. Changed levo to double strength. Got 1 unit platelets. Per ID DC'd vanc, given 460mg Tobramycin 2/2 level <2.   8/10: 1 dose tobramycin given at 10pm. Remains on imbex, propofol and fentanyl drips. Serum sodium stable. Neuro exam unchanged,    Vital Signs Last 24 Hrs  T(C): 37.4 (10 Aug 2023 00:40), Max: 38.7 (09 Aug 2023 15:00)  T(F): 99.3 (10 Aug 2023 00:40), Max: 101.7 (09 Aug 2023 15:00)  HR: 107 (10 Aug 2023 00:44) (91 - 119)  BP: 121/57 (09 Aug 2023 21:00) (107/61 - 121/57)  BP(mean): 82 (09 Aug 2023 21:00) (78 - 86)  RR: 22 (10 Aug 2023 00:00) (20 - 24)  SpO2: 97% (10 Aug 2023 00:44) (94% - 100%)    Parameters below as of 10 Aug 2023 00:00  Patient On (Oxygen Delivery Method): ventilator    O2 Concentration (%): 40    I&O's Summary    08 Aug 2023 07:01  -  09 Aug 2023 07:00  --------------------------------------------------------  IN: 6070.6 mL / OUT: 3440 mL / NET: 2630.6 mL    09 Aug 2023 07:01  -  10 Aug 2023 01:28  --------------------------------------------------------  IN: 3123.6 mL / OUT: 1685 mL / NET: 1438.6 mL      PHYSICAL EXAM:  General: Pt sitting up in bed, on full vent support, paralyzed on nimbex  HEENT: PERRL 3mm  Cardiovascular: RRR, normal S1 and S2   Respiratory: non-labored breathing, symmetric chest rise, on trach to full vent  GI: abd soft, ND   Neuro: A&O x 0, not following commands, limited motor exam as patient is currently paralyzed on nimbex  Wounds: tracheostomy site dressing dry, sutures in place    TUBES/LINES:  [X] CVC  [X] A-line  [] Lumbar Drain  [] Ventriculostomy  [] Other    DIET:  [] NPO  [] Mechanical  [] Tube feeds-- HELD     LABS:                        9.1    4.63  )-----------( 46       ( 09 Aug 2023 13:33 )             27.5         137  |  111<H>  |  15  ----------------------------<  188<H>  3.9   |  17<L>  |  0.50    Ca    7.1<L>      09 Aug 2023 21:59  Phos  3.2       Mg     1.9         TPro  4.1<L>  /  Alb  1.7<L>  /  TBili  2.3<H>  /  DBili  x   /  AST  42<H>  /  ALT  22  /  AlkPhos  73  09    PT/INR - ( 09 Aug 2023 02:38 )   PT: 20.7 sec;   INR: 1.85          PTT - ( 09 Aug 2023 02:38 )  PTT:38.8 sec  Urinalysis Basic - ( 09 Aug 2023 21:59 )    Color: x / Appearance: x / SG: x / pH: x  Gluc: 188 mg/dL / Ketone: x  / Bili: x / Urobili: x   Blood: x / Protein: x / Nitrite: x   Leuk Esterase: x / RBC: x / WBC x   Sq Epi: x / Non Sq Epi: x / Bacteria: x      CARDIAC MARKERS ( 08 Aug 2023 20:10 )  x     / x     / 52 U/L / x     / 1.0 ng/mL      CAPILLARY BLOOD GLUCOSE      POCT Blood Glucose.: 158 mg/dL (09 Aug 2023 23:17)  POCT Blood Glucose.: 127 mg/dL (09 Aug 2023 16:45)  POCT Blood Glucose.: 121 mg/dL (09 Aug 2023 11:34)  POCT Blood Glucose.: 93 mg/dL (09 Aug 2023 07:02)      Drug Levels: [] N/A    CSF Analysis: [] N/A      Allergies    amoxicillin (Rash)  ure-Na (Rash; Fever; Hypotension)    Intolerances      MEDICATIONS:  Antibiotics:  piperacillin/tazobactam IVPB.. 4.5 Gram(s) IV Intermittent every 8 hours    Neuro:  acetaminophen     Tablet .. 650 milliGRAM(s) Oral every 6 hours PRN  cisatracurium Infusion 3 MICROgram(s)/kG/Min IV Continuous <Continuous>  fentaNYL   Infusion. 0.5 MICROgram(s)/kG/Hr IV Continuous <Continuous>  lacosamide IVPB 50 milliGRAM(s) IV Intermittent every 12 hours  metoclopramide Injectable 10 milliGRAM(s) IV Push every 8 hours  propofol Infusion 50 MICROgram(s)/kG/Min IV Continuous <Continuous>    Anticoagulation:    OTHER:  acetylcysteine 10%  Inhalation 4 milliLiter(s) Inhalation every 6 hours  albuterol/ipratropium for Nebulization 3 milliLiter(s) Nebulizer every 6 hours  atorvastatin 20 milliGRAM(s) Oral at bedtime  chlorhexidine 0.12% Liquid 15 milliLiter(s) Oral Mucosa every 12 hours  chlorhexidine 2% Cloths 1 Application(s) Topical <User Schedule>  chlorhexidine 4% Liquid 1 Application(s) Topical <User Schedule>  dexAMETHasone  Injectable 2 milliGRAM(s) IV Push every 12 hours  fludroCORTISONE 0.2 milliGRAM(s) Oral daily  insulin lispro (ADMELOG) corrective regimen sliding scale   SubCutaneous every 6 hours  lactobacillus acidophilus 1 Tablet(s) Oral daily  norepinephrine Infusion 0.05 MICROgram(s)/kG/Min IV Continuous <Continuous>  pantoprazole  Injectable 40 milliGRAM(s) IV Push every 12 hours  petrolatum Ophthalmic Ointment 1 Application(s) Both EYES three times a day  simethicone 80 milliGRAM(s) Chew every 6 hours  sodium chloride 3%  Inhalation 4 milliLiter(s) Inhalation every 6 hours  sucralfate suspension 1 Gram(s) Oral every 6 hours  vasopressin Infusion 0.04 Unit(s)/Min IV Continuous <Continuous>    IVF:  multivitamin 1 Tablet(s) Oral daily  phytonadione  IVPB 10 milliGRAM(s) IV Intermittent daily  sodium chloride 3 Gram(s) Oral every 6 hours  sodium chloride 3%. 500 milliLiter(s) IV Continuous <Continuous>    CULTURES:  Culture Results:   Growth in aerobic bottle: Enterococcus faecalis  Growth in aerobic bottle: Pseudomonas aeruginosa  Susceptibility to follow. ( @ 03:35)  Culture Results:   Growth in anaerobic bottle: Clostridium perfringens  Susceptibility to follow. ( @ 03:35)    RADIOLOGY & ADDITIONAL TESTS:      ASSESSMENT:  69 YO male PMH T2DM, HLD, JAGDISH, tracheal stenosis s/p tracheostomy (s/p closure); glioblastoma s/p resection 2022, and [place] Avastin treatment 3/2023 adm with aphasia, N/V and severe hypoNa (118), imaging showing disesase progression, Hospital course complicated by sepsis secondary to bacteremia, decompensated, intubated, started on antibiotics and upgrated to ICU on . S/p trach with ENT (23). Now with recurrent septic shock and bacteremia, hypoxic respiratory failure, ARDS    PLAN:  Plan:  Neuro:  - pupil checks q1h/vitals q1hrs   - CTH : Interval development of a 13 mm hyperdense nodule along the inferior margin of the resection cavity which may represent progression of disease with hemorrhage. Repeat CTH stable, CTH  stable   - CTA : negative, CTA  stable  - CTP  stable.   - fentanyl drip, propofol drip for RASS -3 to -5, nimbex drip train   - vimpat 50mg BID (switched from keppra  thrombocytopenia) for seizure tx  - Decadron 2mg BID  - c/w home Lexapro 5mg (home dose 5mg)  - home ASA 81 held i/s/o thrombocytopenia   - MRI brain  suspicious for recurrent tumor and hydro   - eeg dc'd , negative for seizures    Pulm:  - Trach (6 Shiley) to full vent support 40/400//8  - respiratory acidosis, trend  - duonebs/mucomyst/3% inhalation q6  - ARDS likely 2/2 aspiration PNA, trend P/F ratio, daily CXR    Cardio:  - MAP goal >65, levo gtt, vasopressin gtt  -  outpatient echo EF 65%  - echo : 60-65%, : EF 62%,  -  QTc 503    GI:  - [place] sump to LIWS for ileus, NPO   - +RT  2/2 multiple loose stools, d/c'd   - protonix BID and carafate 1g q6h for upper GI bleed  - GI following, s/p EGD : severe circumferential esophagitis, small ulcer to anterior stomach  - reglan 10 IV q8h for GI dysmotility    Renal/:  - Hyponatremic (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 as well as SSRI use), s/p tolvaptan ; currently on NaCl tabs 3g q6, Florinef 0.2 BID, 3% @ 30cc/hr  - Campuzano replaced 8/3 by urology for difficult straight cath    Endo:  - A1c 5.7  - ISS  - h/o T2DM: home Januvia held  - h/o HLD: c/w Atorvastatin 20mg    Heme:  - SCDs for DVT ppx, SQL and Aspirin 81 held for thrombocytopenia   - INR 1.9 elevated, trend. Vit k 10mg x3d  - heme consulted for thrombocytopenia- w/u sent, likely chronic thrombocytopenia d/t chemotherapy agent in past   - Per heme transfuse 1U plt if bleeding and platelet count <50k, if febrile and platelet count <20k. Hold transfusing platelets until tomorrow before procedure.   - 1u PRBC, 1U PLT , 2u PRBC, 1u PLT , 1u PRBC 8/3, 2u PLT , 1u PRBC , 1u PLT   - LE dopplers  negative  - LUE doppler : L superficial cephalic vein thrombophlebitis  - CTA PE protocol for tachycardia: negative     ID:  - , bcx growing K. pneumoniae, sputum cx K. pneumoniae  -  blood cx growing pseudomonas, enterococcus and clostridium  - meropenem 1g q8hrs (-), changed to Ceftriaxone/Flagyl per ID recs (-)  - CT CAP : Wall thickening in the colon suggesting an infectious or inflammatory colitis, Suspected small duodenal ulcer with surrounding edema in, Distended gallbladder w/o cholecystitis. Mild ascites. Bibasilar pneumonia L>R.  - Febrile, leukocytosis and tachycardia with elevated lactic acid on   - vancomycin/meropenem empirically (-), d/c per ID and repeat blood cx , NGTD  - Started Vanc/Zosyn started empirically (-), s/p tobramycin IV x1  per ID    Dispo:   - NSICU status, full code, dispo pending    D/w Dr. Dorado and Dr. Hendrix

## 2023-08-10 NOTE — PROGRESS NOTE ADULT - CONVERSATION DETAILS
Pt unable to participate due to lethargy and cognitive impairment. Met with Wood at bedside for support and further discussion of GOC. Reviewed interval developments: namely, pts neuro exam is improving, suspect drug reaction as possible etiology of acute AMS. Patient was able to walk with PT. Family is hopeful to take pt home with home PT instead of [facility].     We reviewed plans for them to ultimately follow up with Dr. Dorado and Dr. Mackenzie for further discussion of suspected progression of disease and options/plan for DMTx.     Wood is well connected with GBM support groups and forums and because of this, has appropriate understanding of disease trajectory. Family does not expect pt to return to his cognitive/functional baselines prior to his cancer; rather they appropriately anticipate he will decline over time. He explains that pt has shared with family that if he were to be approaching end of life and/or no further treatment for GBM were to be offered, he would want to return to [place] with support of hospice/home care.    Further discussed code status, risks/benefits of cpr/intubation. At this time in patients disease course, they wish to continue full code. They understand this conversation will continue to be revisited in the future.     Extensive time spent providing support.
See pall [facility] 7/18 and 7/21    Pt unable to participate due to mental status, intubation. Met with Alfred at bedside, well known to this writer. Reviewed interval events since last visit and complications including septic shock 2/2 bacteremia requiring intubation and suspected GIB pending EGD today.     Alfred shares that he, his wife/HCP, and majority of pts family are hopeful that patient will survive this hospitalization and if so, appropriately expect that pt will be significantly weak and debilitated. I expressed concern that since our last visit, pt appears to be more malnourished with new bl temporal wasting and I worry that after this prolonged and complicated hospital course he may not return to a functional/performance status that would allow him further treatment of his GBM. Family understands that symptom dir approach to care is avail to them at any time, however their wishes are to continue aggressive management with expectation that pt will "eventually get stronger."    We discussed pts previously expressed wishes to die peacefully, comfortably at his brother's home in  when he is at end of life. Reviewed that this is an man who values his functional independence and would not want to live a life where he required total assistance with all ADLs.   We then revisited allowing natural death in setting of cardiac arrest.  Poor outcomes and complications, including death, were re-explained. DNR was recommended given pts previously expressed wishes combined with what pt values and defines as an acceptable QOL.  It was emphasized that DNR does not mean withdrawal of care, hospice, or comfort care.     Plan for further discussion involving patient's daughter, Roxanne, who reportedly "absolutely doesn't want a DNR." Ongoing support provided.
Discussed patient's complicated hospital course and grave prognosis with family. Dr. Monsalve explained the acute medical conditions causing him the most harm, including his septic shock and the worsening of the shock over the last day, as well as his thrombocytopenia and now GIB and ileus. We also discussed his GBM and explained that though he was in a trial, he developed progression of the GBM in the last 2 months. His brothers had questions about prognosis and Dr. Monsalve explained that it is likely days given how much he is actively deteriorating. Members of his family became tearful and one brother asked what withdrawal of care would look like and I explained that if his daughter's decision was to stop aggressive treatment and stop his life support, the next steps would be to discontinue his IV pressors and disconnect his trach from the ventilator. I explained that the first step, however, would be to reopen discussion of DNR. I explained that if his heart were to stop beating despite all the aggressive treatment he is getting, CPR would be unlikely to help him. In reality, doing chest compressions would be harmful to him, likely to break his ribs and not change the course of his disease. But we also explained that these decisions are his daughters' as they are the HCP. Family understood his very grave prognosis and decided to step out while we continued the conversation with his children and son-in-law. Jodie endorsed that they want to put a DNR in place. We spoke about having people visit him who want to say final goodbyes because at this point, his heart is under stress and he could have a cardiac event at any moment. They agreed to have family visit and explained that they may want to talk more about withdrawing care once more family has a chance to see him.

## 2023-08-10 NOTE — PROGRESS NOTE ADULT - SUBJECTIVE AND OBJECTIVE BOX
=================================  NEUROCRITICAL CARE ATTENDING NOTE  =================================    JORDAN CHAKRABORTY   [id number]  Summary:  68y/M  with type 2 diabetes, hyperlipidemia, iron deficiency anemia, tracheal stenosis s/p tracheostomy (which was closed by ENT 7 days ago); glioblastoma s/p resection 1/27/2022, and Avastin treatment 3/2023 presenting with altered mental status and weakness x2 days. Per family, they last saw him last night around 6pm when he was at baseline. Patient has intermittent expressive aphasia but is getting worse and now is persistent, and has intermittent nausea and vomiting since last night. Patient was supposed to receive an outpatient brain MRI on 7/25/2023. Stroke code performed in  is negative for CVA. Head CT shows new hyperdensity in frontal parietal - surgical site. MRI is ordered to rule out tumor recurrence.  Per daughter at bedside, Entresto and Eliquis were stopped per his PC - cardiologist.   (07 Jul 2023 13:10)    COURSE IN THE HOSPITAL:  07/07 Admitted to [facility], started on 3%  07/08 37.8 pancultured No significant events overnight.   07/09 No significant events overnight.    07/29 back to ICU for septic shock - Klebsiella  08/07 back on pressors overnight, CT overnight, 1/2 amp glucose given  08/08 Tmax 38.3 septic shock, bacteremic  08/09 Tmax 38.2 desaturation overnight, pressor requirements increased, stared on  austin / vasopressin; CXR ARDS, paralyzed with  nimbex, given Ca Gluc, 1 amp NaHCO3, given platelet (40s): INR 1.9, given 10 vit K, given tobra x1 dose, vanc extra dose;  08/10 Tmax 38.7    Past Medical History: No pertinent past medical history diabetes mellitus Hypertension  Glioblastoma  Type 2 diabetes mellitus  Hyperlipidemia  Iron deficiency anemia  Nephrolithiasis  Thrombocytopenia  Hyponatremia  BPH (benign prostatic hyperplasia)  Allergies:  amoxicillin (Rash)  Home meds:   ·	aspirin 81 mg oral tablet: 1 tab(s) orally once a day  ·	atorvastatin 20 mg oral tablet: 1 tab(s) orally once a day (at bedtime)  ·	escitalopram 5 mg oral tablet: 1 tab(s) orally once a day  ·	fluticasone 50 mcg/inh nasal spray: 1 spray(s) nasal 2 times a day  ·	Januvia 100 mg oral tablet: 1 tab(s) orally once a day  ·	levETIRAcetam 750 mg oral tablet: 1 tab(s) orally 2 times a day  ·	melatonin 3 mg oral tablet: 2 tab(s) orally once a day (at bedtime)  ·	ondansetron 4 mg oral tablet: 1 tab(s) orally once a day  ·	pantoprazole 40 mg oral delayed release tablet: 1 tab(s) orally once a day (before a meal)  ·	senna (sennosides) 8.6 mg oral tablet: 1 tab(s) orally once a day as needed for  constipation  ·	sodium chloride 1 g oral tablet: 2 tab(s) orally every 6 hours    PHYSICAL EXAMINATION  T(C): 37.7 (08-10 @ 04:45), Max: 38.7 (08-09 @ 15:00) HR: 109 (08-10 @ 07:00) (99 - 119) BP: 121/57 (08-09 @ 21:00) (115/59 - 121/57) RR: 22 (08-10 @ 07:00) (22 - 24) SpO2: 97% (08-10 @ 07:00) (96% - 99%)  NEUROLOGIC EXAMINATION:  Patient is paralyzed  GENERAL: 12 40 400 +5   EENT:  anicteric, trach site clean  CARDIOVASCULAR: (+) S1 S2, normal rate and regular rhythm  PULMONARY: clear to auscultation bilaterally  ABDOMEN: soft, nontender with normoactive bowel sounds  EXTREMITIES: no edema  SKIN: no rash    LABS: 08-10  CAPILLARY BLOOD GLUCOSE 203 158 127 121    (4.63)  8.7  (9.1)  10.78 )-----------( 18 (46)       ( 10 Aug 2023 04:14 )             27.2     136  |  109<H>  |  16  ----------------------------<  207<H>  4.0   |  18<L>  |  0.48<L>    Ca    7.2<L>      10 Aug 2023 04:14  Phos  2.8     08-10  Mg     1.7     08-10    TPro  4.1<L>  /  Alb  1.7<L>  /  TBili  2.3<H>  /  DBili  x   /  AST  42<H>  /  ALT  22  /  AlkPhos  73  08-09 08-09 @ 07:01  -  08-10 @ 07:00  IN: 3982.2 mL / OUT: 2005 mL / NET: 1977.2 mL     Bacteriology:  08/08 sputum GS: mod GNR  08/08 BLood CS Acinetobacter baumanii pseudomonas enterococcus  08/03 Blood CS NG5D x2   07/31 sputum rare to few Klebsiella      Culture - Blood (collected 08-08)  Preliminary Report (08-10):    No growth at 12 hours    Culture - Blood (collected 08-08)  Preliminary Report (08-10):    No growth at 12 hours    Culture - Sputum (collected 08-08)  Gram Stain (08-08):    No epithelial cells seen    Few WBC's    Moderate Gram Negative Rods    Culture - Blood (collected 08-08)  Gram Stain (08-08):    Anaerobic Bottle: Gram Positive Rods    Result called to and read back byEmil Wilder RN ([id number])   08/08/2023    17:52:33    ***Blood Panel PCR results on this specimen are available    approximately 3 hours after the Gram stain result.***    Gram stain, PCR, and/or culture results may not always    correspond due to difference in methodologies.    ************************************************************    This PCR assay was performed using [employer] BCID2 panel.    This assay tests for bacterial, fungal and resistance gene    targets.  Preliminary Report (08-09):    Growth in anaerobic bottle: Clostridium perfringens    Susceptibility to follow.  Organism: Blood Culture PCR (08-08)  Organism: Blood Culture PCR (08-08)    Sensitivities:      Method Type: PCR      -  Acinetobacter baumanii: Nondet      -  Bacteroides fragilis: Nondet      -  Enterobacter cloacae complex: Nondet      -  Escherichia coli: Nondet      -  Enterobacterales: Nondet      -  Haemophilus influenzae: Nondet      -  Klebsiella aerogenes: Nondet      -  Klebsiella oxytoca: Nondet      -  K. pneumoniae group: Nondet      -  Neisseria meningitidis: Nondet      -  Proteus species: Nondet      -  Pseudomonas aeruginosa: Nondet      -  Salmonella species: Nondet      -  Serratia marcescens: Nondet      -  Stenotrophomonas maltophilia: Nondet      -  Enterococcus faecalis: Nondet      -  Enterococcus faecalis,VRE: Nondet      -  Enterococcus faecium: Nondet      -  Enterococcus faecium,VRE: Nondet      -  Listeria monocytogenes: Nondet      -  Coagulase negative Staphylococcus: Nondet      -  Coagulase negative Staphylococcus, Methicillin resistant: Nondet      -  Methicillin SENSITIVE Staphylococcus aureus (MSSA): Nondet      -  Methicillin resistant Staphylococcus aureus (MRSA): Nondet      -  Staphylococcus epidermidis: Nondet      -  Staphylococcus epidermidis, Methicillin resistant: Nondet      -  Staphylococcus lugdunensis: Nondet      -  Staphylococcus lugdunensis, Methicillin resistant: Nondet      -  Streptococcus sp. (Not Grp A, B or S pneumoniae): Nondet      -  Streptococcus agalactiae (Group B): Nondet      -  Streptococcus pneumoniae: Nondet      -  Streptococcus pyogenes (Group A): Nondet      -  Candida albicans: Nondet      -  Candida auris: Nondet      -  Candida glabrata: Nondet      -  Candida krusei: Nondet      -  Candida parapsilosis: Nondet      -  Candida tropicalis: Nondet      -  Cryptococcus neoformans: Nondet    Culture - Blood (collected 08-08)  Gram Stain (08-08):    Aerobic Bottle: Gram Positive Cocci in Pairs and Chains    Result called to and read back by_ LUCY Andre RN([id number]) 08/08/2023 21:36:47    Aerobic Bottle: Gram Negative Rods    Result called to and read back by_ VARUN Lance RN([id number])  08/08/2023    23:12:54    ***Blood Panel PCR results on this specimen are available    approximately 3 hours after the Gram stain result.***    Gram stain, PCR, and/or culture results may not always    correspond due to difference in methodologies.    ************************************************************    This PCR assay was performed using [employer] BCID2 panel.    This assay tests for bacterial, fungal and resistance gene    targets.  Preliminary Report (08-09):    Growth in aerobic bottle: Enterococcus faecalis    Growth in aerobic bottle: Pseudomonas aeruginosa    Susceptibility to follow.  Organism: Blood Culture PCR (08-08)  Organism: Blood Culture PCR (08-08)    Sensitivities:      Method Type: PCR      -  Pseudomonas aeruginosa: Detec      -  Enterococcus faecalis: Detec    Culture - Blood (collected 08-03)  Final Report (08-08):    No growth at 5 days.    Culture - Blood (collected 08-03)  Final Report (08-08):    No growth at 5 days.        CSF studies:  EEG:  Neuroimaging:  Other imaging:    MEDICATIONS: 08-10    ·	piperacillin/tazobactam IVPB.. 4.5 IV Intermittent every 8 hours  ·	lacosamide IVPB 50 IV Intermittent every 12 hours  ·	metoclopramide Injectable 10 IV Push every 8 hours  ·	acetylcysteine 10%  Inhalation 4 Inhalation every 6 hours  ·	albuterol/ipratropium for Nebulization 3 Nebulizer every 6 hours  ·	sodium chloride 3%  Inhalation 4 Inhalation every 6 hours  ·	pantoprazole  Injectable 40 IV Push every 12 hours  ·	simethicone 80 Chew every 6 hours  ·	sucralfate suspension 1 Oral every 6 hours  ·	atorvastatin 20 Oral at bedtime  ·	dexAMETHasone  Injectable 2 IV Push every 12 hours  ·	fludroCORTISONE 0.2 Oral daily  ·	insulin lispro (ADMELOG) corrective regimen sliding scale  SubCutaneous every 6 hours  ·	lactobacillus acidophilus 1 Oral daily  ·	magnesium sulfate  IVPB 2 IV Intermittent once  ·	multivitamin 1 Oral daily  ·	petrolatum Ophthalmic Ointment 1 Both EYES three times a day  ·	phytonadione  IVPB 10 IV Intermittent daily  ·	sodium chloride 3 Oral every 6 hours  ·	acetaminophen     Tablet .. 650 Oral every 6 hours PRN     IV FLUIDS: 3%@30cc/hr;   DRIPS:  ·	norepinephrine Infusion 0.05 MICROgram(s)/kG/Min IV Continuous <Continuous> 0.6  ·	phenylephrine    Infusion 0.1 MICROgram(s)/kG/Min IV Continuous <Continuous> - OFF  ·	vasopressin Infusion 0.04 IV Continuous <Continuous> 0.04  ·	cisatracurium Infusion 3 IV Continuous <Continuous> 1.3   ·	fentaNYL   Infusion. 0.5 IV Continuous <Continuous> 0.5  ·	propofol 50  DIET: OFF  Lines: R IJ [place]  Drains:   Campuzano   Wounds:    CODE STATUS:  Full Code                       GOALS OF CARE:  aggressive                      DISPOSITION:  ICU =================================  NEUROCRITICAL CARE ATTENDING NOTE  =================================    JORDAN CHAKRABORTY   MRN-[id number]  Summary:  68y/M  with type 2 diabetes, hyperlipidemia, iron deficiency anemia, tracheal stenosis s/p tracheostomy (which was closed by ENT 7 days ago); glioblastoma s/p resection 1/27/2022, and Avastin treatment 3/2023 presenting with altered mental status and weakness x2 days. Per family, they last saw him last night around 6pm when he was at baseline. Patient has intermittent expressive aphasia but is getting worse and now is persistent, and has intermittent nausea and vomiting since last night. Patient was supposed to receive an outpatient brain MRI on 7/25/2023. Stroke code performed in  is negative for CVA. Head CT shows new hyperdensity in frontal parietal - surgical site. MRI is ordered to rule out tumor recurrence.  Per daughter at bedside, Entresto and Eliquis were stopped per his PC - cardiologist.   (07 Jul 2023 13:10)    COURSE IN THE HOSPITAL:  07/07 Admitted to [facility], started on 3%  07/08 37.8 pancultured No significant events overnight.   07/09 No significant events overnight.    07/29 back to ICU for septic shock - Klebsiella  08/07 back on pressors overnight, CT overnight, 1/2 amp glucose given  08/08 Tmax 38.3 septic shock, bacteremic  08/09 Tmax 38.2 desaturation overnight, pressor requirements increased, stared on  austin / vasopressin; CXR ARDS, paralyzed with  nimbex, given Ca Gluc, 1 amp NaHCO3, given platelet (40s): INR 1.9, given 10 vit K, given tobra x1 dose, vanc extra dose;  08/10 Tmax 38.7    Past Medical History: No pertinent past medical history diabetes mellitus Hypertension  Glioblastoma  Type 2 diabetes mellitus  Hyperlipidemia  Iron deficiency anemia  Nephrolithiasis  Thrombocytopenia  Hyponatremia  BPH (benign prostatic hyperplasia)  Allergies:  amoxicillin (Rash)  Home meds:   ·	aspirin 81 mg oral tablet: 1 tab(s) orally once a day  ·	atorvastatin 20 mg oral tablet: 1 tab(s) orally once a day (at bedtime)  ·	escitalopram 5 mg oral tablet: 1 tab(s) orally once a day  ·	fluticasone 50 mcg/inh nasal spray: 1 spray(s) nasal 2 times a day  ·	Januvia 100 mg oral tablet: 1 tab(s) orally once a day  ·	levETIRAcetam 750 mg oral tablet: 1 tab(s) orally 2 times a day  ·	melatonin 3 mg oral tablet: 2 tab(s) orally once a day (at bedtime)  ·	ondansetron 4 mg oral tablet: 1 tab(s) orally once a day  ·	pantoprazole 40 mg oral delayed release tablet: 1 tab(s) orally once a day (before a meal)  ·	senna (sennosides) 8.6 mg oral tablet: 1 tab(s) orally once a day as needed for  constipation  ·	sodium chloride 1 g oral tablet: 2 tab(s) orally every 6 hours    PHYSICAL EXAMINATION  T(C): 37.7 (08-10 @ 04:45), Max: 38.7 (08-09 @ 15:00) HR: 109 (08-10 @ 07:00) (99 - 119) BP: 121/57 (08-09 @ 21:00) (115/59 - 121/57) RR: 22 (08-10 @ 07:00) (22 - 24) SpO2: 97% (08-10 @ 07:00) (96% - 99%)  NEUROLOGIC EXAMINATION:  Patient is paralyzed  GENERAL: 22 40 400 +8  EENT:  anicteric, trach site clean  CARDIOVASCULAR: (+) S1 S2, normal rate and regular rhythm  PULMONARY: clear to auscultation bilaterally  ABDOMEN: soft, nontender with normoactive bowel sounds  EXTREMITIES: no edema  SKIN: no rash    LABS: 08-10  CAPILLARY BLOOD GLUCOSE 203 158 127 121    (4.63)  8.7  (9.1)  10.78 )-----------( 18 (46)       ( 10 Aug 2023 04:14 )             27.2     136  |  109<H>  |  16  ----------------------------<  207<H>  4.0   |  18<L>  |  0.48<L>    Ca    7.2<L>      10 Aug 2023 04:14  Phos  2.8     08-10  Mg     1.7     08-10    TPro  4.1<L>  /  Alb  1.7<L>  /  TBili  2.3<H>  /  DBili  x   /  AST  42<H>  /  ALT  22  /  AlkPhos  73  08-09 08-09 @ 07:01  -  08-10 @ 07:00  IN: 3982.2 mL / OUT: 2005 mL / NET: 1977.2 mL     Bacteriology:  08/08 sputum GS: mod GNR  08/08 BLood CS Acinetobacter baumanii pseudomonas enterococcus  08/03 Blood CS NG5D x2   07/31 sputum rare to few Klebsiella      Culture - Blood (collected 08-08)  Preliminary Report (08-10):    No growth at 12 hours    Culture - Blood (collected 08-08)  Preliminary Report (08-10):    No growth at 12 hours    Culture - Sputum (collected 08-08)  Gram Stain (08-08):    No epithelial cells seen    Few WBC's    Moderate Gram Negative Rods    Culture - Blood (collected 08-08)  Gram Stain (08-08):    Anaerobic Bottle: Gram Positive Rods    Result called to and read back byEmil Wilder RN ([id number])   08/08/2023    17:52:33    ***Blood Panel PCR results on this specimen are available    approximately 3 hours after the Gram stain result.***    Gram stain, PCR, and/or culture results may not always    correspond due to difference in methodologies.    ************************************************************    This PCR assay was performed using [employer] BCID2 panel.    This assay tests for bacterial, fungal and resistance gene    targets.  Preliminary Report (08-09):    Growth in anaerobic bottle: Clostridium perfringens    Susceptibility to follow.  Organism: Blood Culture PCR (08-08)  Organism: Blood Culture PCR (08-08)    Sensitivities:      Method Type: PCR      -  Acinetobacter baumanii: Nondet      -  Bacteroides fragilis: Nondet      -  Enterobacter cloacae complex: Nondet      -  Escherichia coli: Nondet      -  Enterobacterales: Nondet      -  Haemophilus influenzae: Nondet      -  Klebsiella aerogenes: Nondet      -  Klebsiella oxytoca: Nondet      -  K. pneumoniae group: Nondet      -  Neisseria meningitidis: Nondet      -  Proteus species: Nondet      -  Pseudomonas aeruginosa: Nondet      -  Salmonella species: Nondet      -  Serratia marcescens: Nondet      -  Stenotrophomonas maltophilia: Nondet      -  Enterococcus faecalis: Nondet      -  Enterococcus faecalis,VRE: Nondet      -  Enterococcus faecium: Nondet      -  Enterococcus faecium,VRE: Nondet      -  Listeria monocytogenes: Nondet      -  Coagulase negative Staphylococcus: Nondet      -  Coagulase negative Staphylococcus, Methicillin resistant: Nondet      -  Methicillin SENSITIVE Staphylococcus aureus (MSSA): Nondet      -  Methicillin resistant Staphylococcus aureus (MRSA): Nondet      -  Staphylococcus epidermidis: Nondet      -  Staphylococcus epidermidis, Methicillin resistant: Nondet      -  Staphylococcus lugdunensis: Nondet      -  Staphylococcus lugdunensis, Methicillin resistant: Nondet      -  Streptococcus sp. (Not Grp A, B or S pneumoniae): Nondet      -  Streptococcus agalactiae (Group B): Nondet      -  Streptococcus pneumoniae: Nondet      -  Streptococcus pyogenes (Group A): Nondet      -  Candida albicans: Nondet      -  Candida auris: Nondet      -  Candida glabrata: Nondet      -  Candida krusei: Nondet      -  Candida parapsilosis: Nondet      -  Candida tropicalis: Nondet      -  Cryptococcus neoformans: Nondet    Culture - Blood (collected 08-08)  Gram Stain (08-08):    Aerobic Bottle: Gram Positive Cocci in Pairs and Chains    Result called to and read back by_ LUCY Andre RN([id number]) 08/08/2023 21:36:47    Aerobic Bottle: Gram Negative Rods    Result called to and read back by_ VARUN Lance RN([id number])  08/08/2023    23:12:54    ***Blood Panel PCR results on this specimen are available    approximately 3 hours after the Gram stain result.***    Gram stain, PCR, and/or culture results may not always    correspond due to difference in methodologies.    ************************************************************    This PCR assay was performed using [employer] BCID2 panel.    This assay tests for bacterial, fungal and resistance gene    targets.  Preliminary Report (08-09):    Growth in aerobic bottle: Enterococcus faecalis    Growth in aerobic bottle: Pseudomonas aeruginosa    Susceptibility to follow.  Organism: Blood Culture PCR (08-08)  Organism: Blood Culture PCR (08-08)    Sensitivities:      Method Type: PCR      -  Pseudomonas aeruginosa: Detec      -  Enterococcus faecalis: Detec    Culture - Blood (collected 08-03)  Final Report (08-08):    No growth at 5 days.    Culture - Blood (collected 08-03)  Final Report (08-08):    No growth at 5 days.        CSF studies:  EEG:  Neuroimaging:  Other imaging:    MEDICATIONS: 08-10    ·	piperacillin/tazobactam IVPB.. 4.5 IV Intermittent every 8 hours  ·	lacosamide IVPB 50 IV Intermittent every 12 hours  ·	metoclopramide Injectable 10 IV Push every 8 hours  ·	acetylcysteine 10%  Inhalation 4 Inhalation every 6 hours  ·	albuterol/ipratropium for Nebulization 3 Nebulizer every 6 hours  ·	sodium chloride 3%  Inhalation 4 Inhalation every 6 hours  ·	pantoprazole  Injectable 40 IV Push every 12 hours  ·	simethicone 80 Chew every 6 hours  ·	sucralfate suspension 1 Oral every 6 hours  ·	atorvastatin 20 Oral at bedtime  ·	dexAMETHasone  Injectable 2 IV Push every 12 hours  ·	fludroCORTISONE 0.2 Oral daily  ·	insulin lispro (ADMELOG) corrective regimen sliding scale  SubCutaneous every 6 hours  ·	lactobacillus acidophilus 1 Oral daily  ·	magnesium sulfate  IVPB 2 IV Intermittent once  ·	multivitamin 1 Oral daily  ·	petrolatum Ophthalmic Ointment 1 Both EYES three times a day  ·	phytonadione  IVPB 10 IV Intermittent daily  ·	sodium chloride 3 Oral every 6 hours  ·	acetaminophen     Tablet .. 650 Oral every 6 hours PRN     IV FLUIDS: 3%@30cc/hr;   DRIPS:  ·	norepinephrine Infusion 0.05 MICROgram(s)/kG/Min IV Continuous <Continuous> 0.67  ·	phenylephrine    Infusion 0.1 MICROgram(s)/kG/Min IV Continuous <Continuous> - OFF  ·	vasopressin Infusion 0.04 IV Continuous <Continuous> 0.04  ·	cisatracurium Infusion 3 IV Continuous <Continuous> 3  ·	fentaNYL   Infusion. 0.5 IV Continuous <Continuous> 1.0  ·	propofol 50  DIET: OFF  Lines: R IJ [place]  Drains:   Campuzano   Wounds:    CODE STATUS:  Full Code                       GOALS OF CARE:  aggressive                      DISPOSITION:  ICU =================================  NEUROCRITICAL CARE ATTENDING NOTE  =================================    JORDAN CHAKRABORTY   MRN-[id number]  Summary:  68y/M  with type 2 diabetes, hyperlipidemia, iron deficiency anemia, tracheal stenosis s/p tracheostomy (which was closed by ENT 7 days ago); glioblastoma s/p resection 1/27/2022, and Avastin treatment 3/2023 presenting with altered mental status and weakness x2 days. Per family, they last saw him last night around 6pm when he was at baseline. Patient has intermittent expressive aphasia but is getting worse and now is persistent, and has intermittent nausea and vomiting since last night. Patient was supposed to receive an outpatient brain MRI on 7/25/2023. Stroke code performed in  is negative for CVA. Head CT shows new hyperdensity in frontal parietal - surgical site. MRI is ordered to rule out tumor recurrence.  Per daughter at bedside, Entresto and Eliquis were stopped per his PC - cardiologist.   (07 Jul 2023 13:10)    COURSE IN THE HOSPITAL:  07/07 Admitted to [facility], started on 3%  07/08 37.8 pancultured No significant events overnight.   07/09 No significant events overnight.    07/29 back to ICU for septic shock - Klebsiella  08/07 back on pressors overnight, CT overnight, 1/2 amp glucose given  08/08 Tmax 38.3 septic shock, bacteremic  08/09 Tmax 38.2 desaturation overnight, pressor requirements increased, stared on  austin / vasopressin; CXR ARDS, paralyzed with  nimbex, given Ca Gluc, 1 amp NaHCO3, given platelet (40s): INR 1.9, given 10 vit K, given tobra x1 dose, vanc extra dose;  08/10 Tmax 38.7 remains paralyzed, given tobra x1 yesterday; platelets down to 18     Past Medical History: No pertinent past medical history diabetes mellitus Hypertension  Glioblastoma  Type 2 diabetes mellitus  Hyperlipidemia  Iron deficiency anemia  Nephrolithiasis  Thrombocytopenia  Hyponatremia  BPH (benign prostatic hyperplasia)  Allergies:  amoxicillin (Rash)  Home meds:   ·	aspirin 81 mg oral tablet: 1 tab(s) orally once a day  ·	atorvastatin 20 mg oral tablet: 1 tab(s) orally once a day (at bedtime)  ·	escitalopram 5 mg oral tablet: 1 tab(s) orally once a day  ·	fluticasone 50 mcg/inh nasal spray: 1 spray(s) nasal 2 times a day  ·	Januvia 100 mg oral tablet: 1 tab(s) orally once a day  ·	levETIRAcetam 750 mg oral tablet: 1 tab(s) orally 2 times a day  ·	melatonin 3 mg oral tablet: 2 tab(s) orally once a day (at bedtime)  ·	ondansetron 4 mg oral tablet: 1 tab(s) orally once a day  ·	pantoprazole 40 mg oral delayed release tablet: 1 tab(s) orally once a day (before a meal)  ·	senna (sennosides) 8.6 mg oral tablet: 1 tab(s) orally once a day as needed for  constipation  ·	sodium chloride 1 g oral tablet: 2 tab(s) orally every 6 hours    PHYSICAL EXAMINATION  T(C): 37.7 (08-10 @ 04:45), Max: 38.7 (08-09 @ 15:00) HR: 109 (08-10 @ 07:00) (99 - 119) BP: 121/57 (08-09 @ 21:00) (115/59 - 121/57) RR: 22 (08-10 @ 07:00) (22 - 24) SpO2: 97% (08-10 @ 07:00) (96% - 99%)  NEUROLOGIC EXAMINATION:  Patient is paralyzed JUAN   GENERAL: 22 40 400 +8  EENT:  anicteric, trach site clean  CARDIOVASCULAR: (+) S1 S2, normal rate and regular rhythm  PULMONARY: clear to auscultation bilaterally  ABDOMEN: soft, nontender with normoactive bowel sounds  EXTREMITIES: no edema  SKIN: no rash    LABS: 08-10  CAPILLARY BLOOD GLUCOSE 203 158 127 121 - 6 units given    (4.63)  8.7  (9.1)  10.78 )-----------( 18 (46)       ( 10 Aug 2023 04:14 )             27.2     136  |  109<H>  |  16  ----------------------------<  207<H>  4.0   |  18<L>  |  0.48<L>    Ca    7.2<L>      10 Aug 2023 04:14  Phos  2.8     08-10  Mg     1.7     08-10    TPro  4.1<L>  /  Alb  1.7<L>  /  TBili  2.3<H>  /  DBili  x   /  AST  42<H>  /  ALT  22  /  AlkPhos  73  08-09 08-09 @ 07:01  -  08-10 @ 07:00  IN: 3982.2 mL / OUT: 2005 mL / NET: 1977.2 mL    PT/INR - ( 10 Aug 2023 04:14 )   PT: 19.3 sec;   INR: 1.72    PTT - ( 10 Aug 2023 04:14 )  PTT:41.0 sec  INR TREND:1.72 (08-10 @ 04:14)1.85 (08-09 @ 02:38)1.99 (08-08 @ 20:08) 1.27 (08-04 @ 02:39) 1.26 (07-29 @ 15:34)    ABG - ( 10 Aug 2023 04:24 )pH, Arterial: 7.29  pH, Blood: x     /  pCO2: 39    /  pO2: 102   / HCO3: 19    / Base Excess: -7.3  /  SaO2: 99.1       Bacteriology:  08/08 blood CS [id number]  08/08 sputum GS: mod GNR  08/08 Blood CS pseudomonas enterococcus clostridium perfringens  08/03 Blood CS NG5D x2   07/31 sputum rare to few Klebsiella    CSF studies:  EEG:  Neuroimaging:  Other imaging:    MEDICATIONS: 08-10    ·	piperacillin/tazobactam IVPB.. 4.5 IV Intermittent every 8 hours  ·	lacosamide IVPB 50 IV Intermittent every 12 hours  ·	metoclopramide Injectable 10 IV Push every 8 hours  ·	acetylcysteine 10%  Inhalation 4 Inhalation every 6 hours  ·	albuterol/ipratropium for Nebulization 3 Nebulizer every 6 hours  ·	sodium chloride 3%  Inhalation 4 Inhalation every 6 hours  ·	pantoprazole  Injectable 40 IV Push every 12 hours  ·	simethicone 80 Chew every 6 hours  ·	sucralfate suspension 1 Oral every 6 hours  ·	atorvastatin 20 Oral at bedtime  ·	dexAMETHasone  Injectable 2 IV Push every 12 hours  ·	fludroCORTISONE 0.2 Oral daily  ·	insulin lispro (ADMELOG) corrective regimen sliding scale  SubCutaneous every 6 hours  ·	lactobacillus acidophilus 1 Oral daily  ·	magnesium sulfate  IVPB 2 IV Intermittent once  ·	multivitamin 1 Oral daily  ·	petrolatum Ophthalmic Ointment 1 Both EYES three times a day  ·	phytonadione  IVPB 10 IV Intermittent daily  ·	sodium chloride 3 Oral every 6 hours  ·	acetaminophen     Tablet .. 650 Oral every 6 hours PRN     IV FLUIDS: 3%@30cc/hr;   DRIPS:  ·	norepinephrine Infusion 0.05 MICROgram(s)/kG/Min IV Continuous <Continuous> 0.66  ·	phenylephrine    Infusion 0.1 MICROgram(s)/kG/Min IV Continuous <Continuous> - OFF  ·	vasopressin Infusion 0.04 IV Continuous <Continuous> 0.04  ·	cisatracurium Infusion 3 IV Continuous <Continuous> 3  ·	fentaNYL   Infusion. 0.5 IV Continuous <Continuous> 1.0  ·	propofol 50  DIET: OFF  Lines: R IJ Kaylah  Drains:   Campuzano   Wounds:    CODE STATUS:  Full Code                       GOALS OF CARE:  aggressive                      DISPOSITION:  ICU

## 2023-08-10 NOTE — PROGRESS NOTE ADULT - PROBLEM SELECTOR PLAN 6
.  - Full code  - reported HCP Roxana Norwood [phone], though no HCP on file  - If no HCP, pts three adult children would together be decision making surrogates    In addition to the E/M visit, an advance care planning meeting was performed. Start time: 1200; End time: 1236; Total time: 36min. A face to face meeting to discuss advance care planning was held today regarding:   CLAUDIA Norwood  Primary decision maker:  Patient is unable to participate in decision making;  Alternate/surrogate:  Roxanne Norwood  . Discussed advance directives including, but not limited to, healthcare proxy and code status, as well as disease trajectory, patient's values/goals, and health care options that are available for end of life care. Decision regarding code status: FULL CODE; Documentation completed today:  [facility] note
Following for support and GOC. Will continue to follow.    Abi Oseguera MD  Palliative Care Attending  Geriatrics and Palliative Consult Service
.  Complex medical decision making / symptom management / support in the setting of advanced illness.    Coping:  well[  ] with difficulty[  ] poor coping[  ] unable to assess[x  ]   Support system: strong[ x ] adequate[  ] inadequate[  ]    Active Psychosocial Referrals:  SW/CM[ x ] PT/OT[  x] Hospice[  ]  Ethics[  ]    - Palliative medicine will sign off.  Please contact Palliative Medicine 24/7 at [phone] if the patient's symptoms and/or goals of care change, need to be readdressed, or if patient is readmitted in the future.

## 2023-08-10 NOTE — PROVIDER CONTACT NOTE (OTHER) - DATE AND TIME:
08-Aug-2023 19:15
09-Aug-2023 13:00
09-Aug-2023 19:12
10-Aug-2023 01:00
29-Jul-2023 05:19
08-Aug-2023 21:18
18-Jul-2023 04:35
18-Jul-2023 18:30
29-Jul-2023 04:00
23-Jul-2023 14:27
24-Jul-2023 02:43
08-Aug-2023 03:34
17-Jul-2023 20:40
18-Jul-2023 07:36
18-Jul-2023 21:00
08-Aug-2023 01:00
19-Jul-2023 06:30
08-Aug-2023 01:30

## 2023-08-10 NOTE — ADVANCED PRACTICE NURSE CONSULT - RECOMMEDATIONS
Left ear and sacral pressure injuries - cleanse with normal saline, apply Triad cream, cover with foam dressing every other day.   WOCN discussed assessment and recommendations with JUMA Whipple.  Left ear and sacral pressure injuries - cleanse with normal saline, apply Triad cream, cover with foam dressing every other day.   WOCN discussed assessment and recommendations with RNSole and VARUN HILL.

## 2023-08-10 NOTE — PROGRESS NOTE ADULT - SUBJECTIVE AND OBJECTIVE BOX
INFECTIOUS DISEASES CONSULT FOLLOW-UP NOTE    INTERVAL HPI/OVERNIGHT EVENTS:      ROS:   Constitutional, eyes, ENT, cardiovascular, respiratory, gastrointestinal, genitourinary, integumentary, neurological, psychiatric and heme/lymph are otherwise negative other than noted above       ANTIBIOTICS/RELEVANT:    MEDICATIONS  (STANDING):  acetylcysteine 10%  Inhalation 4 milliLiter(s) Inhalation every 6 hours  albuterol/ipratropium for Nebulization 3 milliLiter(s) Nebulizer every 6 hours  atorvastatin 20 milliGRAM(s) Oral at bedtime  chlorhexidine 0.12% Liquid 15 milliLiter(s) Oral Mucosa every 12 hours  chlorhexidine 2% Cloths 1 Application(s) Topical <User Schedule>  chlorhexidine 4% Liquid 1 Application(s) Topical <User Schedule>  cisatracurium Infusion 3 MICROgram(s)/kG/Min (13.1 mL/Hr) IV Continuous <Continuous>  fentaNYL   Infusion. 0.5 MICROgram(s)/kG/Hr (3.63 mL/Hr) IV Continuous <Continuous>  fludroCORTISONE 0.2 milliGRAM(s) Oral daily  hydrocortisone sodium succinate Injectable 50 milliGRAM(s) IV Push every 6 hours  insulin lispro (ADMELOG) corrective regimen sliding scale   SubCutaneous every 6 hours  lacosamide IVPB 50 milliGRAM(s) IV Intermittent every 12 hours  lactobacillus acidophilus 1 Tablet(s) Oral daily  metoclopramide Injectable 10 milliGRAM(s) IV Push every 8 hours  metoprolol tartrate Injectable 5 milliGRAM(s) IV Push once  multivitamin 1 Tablet(s) Oral daily  norepinephrine Infusion 0.05 MICROgram(s)/kG/Min (3.4 mL/Hr) IV Continuous <Continuous>  pantoprazole  Injectable 40 milliGRAM(s) IV Push every 12 hours  petrolatum Ophthalmic Ointment 1 Application(s) Both EYES three times a day  phenylephrine    Infusion 0.1 MICROgram(s)/kG/Min (2.72 mL/Hr) IV Continuous <Continuous>  phytonadione  IVPB 10 milliGRAM(s) IV Intermittent daily  piperacillin/tazobactam IVPB.. 4.5 Gram(s) IV Intermittent every 8 hours  propofol Infusion 50 MICROgram(s)/kG/Min (21.8 mL/Hr) IV Continuous <Continuous>  simethicone 80 milliGRAM(s) Chew every 6 hours  sodium chloride 3 Gram(s) Oral every 6 hours  sodium chloride 3%  Inhalation 4 milliLiter(s) Inhalation every 6 hours  sodium chloride 3%. 500 milliLiter(s) (30 mL/Hr) IV Continuous <Continuous>  sucralfate suspension 1 Gram(s) Oral every 6 hours  vasopressin Infusion 0.04 Unit(s)/Min (6 mL/Hr) IV Continuous <Continuous>    MEDICATIONS  (PRN):  acetaminophen     Tablet .. 650 milliGRAM(s) Oral every 6 hours PRN Temp greater or equal to 38C (100.4F), Mild Pain (1 - 3)  fentaNYL    Injectable 50 MICROGram(s) IV Push every 2 hours PRN agitation/tachycardia/vent desynchrony  sodium chloride 0.9% lock flush 10 milliLiter(s) IV Push every 1 hour PRN Pre/post blood products, medications, blood draw, and to maintain line patency  sodium chloride 0.9% lock flush 10 milliLiter(s) IV Push every 1 hour PRN Pre/post blood products, medications, blood draw, and to maintain line patency        Vital Signs Last 24 Hrs  T(C): 37.5 (10 Aug 2023 09:07), Max: 38.7 (09 Aug 2023 15:00)  T(F): 99.5 (10 Aug 2023 09:07), Max: 101.7 (09 Aug 2023 15:00)  HR: 99 (10 Aug 2023 12:00) (97 - 142)  BP: 118/70 (10 Aug 2023 11:05) (115/59 - 121/57)  BP(mean): 87 (10 Aug 2023 11:05) (80 - 87)  RR: 22 (10 Aug 2023 12:00) (22 - 24)  SpO2: 98% (10 Aug 2023 12:00) (96% - 98%)    Parameters below as of 10 Aug 2023 12:00  Patient On (Oxygen Delivery Method): ventilator    O2 Concentration (%): 40    08-09-23 @ 07:01  -  08-10-23 @ 07:00  --------------------------------------------------------  IN: 4107.2 mL / OUT: 2290 mL / NET: 1817.2 mL    08-10-23 @ 07:01  -  08-10-23 @ 12:03  --------------------------------------------------------  IN: 631.7 mL / OUT: 415 mL / NET: 216.7 mL      PHYSICAL EXAM:  Constitutional: alert, NAD  Eyes: the sclera and conjunctiva were normal.   ENT: the ears and nose were normal in appearance.   Neck: the appearance of the neck was normal and the neck was supple.   Pulmonary: no respiratory distress and lungs were clear to auscultation bilaterally.   Heart: heart rate was normal and rhythm regular, normal S1 and S2  Vascular:. there was no peripheral edema  Abdomen: normal bowel sounds, soft, non-tender  Neurological: no focal deficits.   Psychiatric: the affect was normal        LABS:                        7.9    11.91 )-----------( 50       ( 10 Aug 2023 11:22 )             24.4     08-10    136  |  109<H>  |  16  ----------------------------<  207<H>  4.0   |  18<L>  |  0.48<L>    Ca    7.2<L>      10 Aug 2023 04:14  Phos  2.8     08-10  Mg     1.7     08-10    TPro  4.1<L>  /  Alb  1.7<L>  /  TBili  2.3<H>  /  DBili  x   /  AST  42<H>  /  ALT  22  /  AlkPhos  73  08-09    PT/INR - ( 10 Aug 2023 04:14 )   PT: 19.3 sec;   INR: 1.72          PTT - ( 10 Aug 2023 04:14 )  PTT:41.0 sec  Urinalysis Basic - ( 10 Aug 2023 04:14 )    Color: x / Appearance: x / SG: x / pH: x  Gluc: 207 mg/dL / Ketone: x  / Bili: x / Urobili: x   Blood: x / Protein: x / Nitrite: x   Leuk Esterase: x / RBC: x / WBC x   Sq Epi: x / Non Sq Epi: x / Bacteria: x        MICROBIOLOGY:      RADIOLOGY & ADDITIONAL STUDIES:  Reviewed INFECTIOUS DISEASES CONSULT FOLLOW-UP NOTE    INTERVAL HPI/OVERNIGHT EVENTS:    Patient seen and examined at bedside. RAMON Gtz yesterday afternoon 101.7, 99.8 this AM. Intubated, on pressors and sedation. Unable to obtain ROS       ROS:   Constitutional, eyes, ENT, cardiovascular, respiratory, gastrointestinal, genitourinary, integumentary, neurological, psychiatric and heme/lymph are otherwise negative other than noted above       ANTIBIOTICS/RELEVANT:    MEDICATIONS  (STANDING):  acetylcysteine 10%  Inhalation 4 milliLiter(s) Inhalation every 6 hours  albuterol/ipratropium for Nebulization 3 milliLiter(s) Nebulizer every 6 hours  atorvastatin 20 milliGRAM(s) Oral at bedtime  chlorhexidine 0.12% Liquid 15 milliLiter(s) Oral Mucosa every 12 hours  chlorhexidine 2% Cloths 1 Application(s) Topical <User Schedule>  chlorhexidine 4% Liquid 1 Application(s) Topical <User Schedule>  cisatracurium Infusion 3 MICROgram(s)/kG/Min (13.1 mL/Hr) IV Continuous <Continuous>  fentaNYL   Infusion. 0.5 MICROgram(s)/kG/Hr (3.63 mL/Hr) IV Continuous <Continuous>  fludroCORTISONE 0.2 milliGRAM(s) Oral daily  hydrocortisone sodium succinate Injectable 50 milliGRAM(s) IV Push every 6 hours  insulin lispro (ADMELOG) corrective regimen sliding scale   SubCutaneous every 6 hours  lacosamide IVPB 50 milliGRAM(s) IV Intermittent every 12 hours  lactobacillus acidophilus 1 Tablet(s) Oral daily  metoclopramide Injectable 10 milliGRAM(s) IV Push every 8 hours  metoprolol tartrate Injectable 5 milliGRAM(s) IV Push once  multivitamin 1 Tablet(s) Oral daily  norepinephrine Infusion 0.05 MICROgram(s)/kG/Min (3.4 mL/Hr) IV Continuous <Continuous>  pantoprazole  Injectable 40 milliGRAM(s) IV Push every 12 hours  petrolatum Ophthalmic Ointment 1 Application(s) Both EYES three times a day  phenylephrine    Infusion 0.1 MICROgram(s)/kG/Min (2.72 mL/Hr) IV Continuous <Continuous>  phytonadione  IVPB 10 milliGRAM(s) IV Intermittent daily  piperacillin/tazobactam IVPB.. 4.5 Gram(s) IV Intermittent every 8 hours  propofol Infusion 50 MICROgram(s)/kG/Min (21.8 mL/Hr) IV Continuous <Continuous>  simethicone 80 milliGRAM(s) Chew every 6 hours  sodium chloride 3 Gram(s) Oral every 6 hours  sodium chloride 3%  Inhalation 4 milliLiter(s) Inhalation every 6 hours  sodium chloride 3%. 500 milliLiter(s) (30 mL/Hr) IV Continuous <Continuous>  sucralfate suspension 1 Gram(s) Oral every 6 hours  vasopressin Infusion 0.04 Unit(s)/Min (6 mL/Hr) IV Continuous <Continuous>    MEDICATIONS  (PRN):  acetaminophen     Tablet .. 650 milliGRAM(s) Oral every 6 hours PRN Temp greater or equal to 38C (100.4F), Mild Pain (1 - 3)  fentaNYL    Injectable 50 MICROGram(s) IV Push every 2 hours PRN agitation/tachycardia/vent desynchrony  sodium chloride 0.9% lock flush 10 milliLiter(s) IV Push every 1 hour PRN Pre/post blood products, medications, blood draw, and to maintain line patency  sodium chloride 0.9% lock flush 10 milliLiter(s) IV Push every 1 hour PRN Pre/post blood products, medications, blood draw, and to maintain line patency        Vital Signs Last 24 Hrs  T(C): 37.5 (10 Aug 2023 09:07), Max: 38.7 (09 Aug 2023 15:00)  T(F): 99.5 (10 Aug 2023 09:07), Max: 101.7 (09 Aug 2023 15:00)  HR: 99 (10 Aug 2023 12:00) (97 - 142)  BP: 118/70 (10 Aug 2023 11:05) (115/59 - 121/57)  BP(mean): 87 (10 Aug 2023 11:05) (80 - 87)  RR: 22 (10 Aug 2023 12:00) (22 - 24)  SpO2: 98% (10 Aug 2023 12:00) (96% - 98%)    Parameters below as of 10 Aug 2023 12:00  Patient On (Oxygen Delivery Method): ventilator    O2 Concentration (%): 40    08-09-23 @ 07:01  -  08-10-23 @ 07:00  --------------------------------------------------------  IN: 4107.2 mL / OUT: 2290 mL / NET: 1817.2 mL    08-10-23 @ 07:01  -  08-10-23 @ 12:03  --------------------------------------------------------  IN: 631.7 mL / OUT: 415 mL / NET: 216.7 mL      PHYSICAL EXAM:  Constitutional: NAD, sedated   Eyes: the sclera and conjunctiva were normal.   ENT: the ears and nose were normal in appearance.   Neck: the appearance of the neck was normal and the neck was supple. +trach  Pulmonary: no respiratory distress and lungs were clear to auscultation bilaterally.   Heart: heart rate was normal and rhythm regular, normal S1 and S2  Vascular:. there was no peripheral edema  Abdomen: normal bowel sounds, soft, non-tender  Neurological: no focal deficits.   Psychiatric: the affect was normal        LABS:                        7.9    11.91 )-----------( 50       ( 10 Aug 2023 11:22 )             24.4     08-10    136  |  109<H>  |  16  ----------------------------<  207<H>  4.0   |  18<L>  |  0.48<L>    Ca    7.2<L>      10 Aug 2023 04:14  Phos  2.8     08-10  Mg     1.7     08-10    TPro  4.1<L>  /  Alb  1.7<L>  /  TBili  2.3<H>  /  DBili  x   /  AST  42<H>  /  ALT  22  /  AlkPhos  73  08-09    PT/INR - ( 10 Aug 2023 04:14 )   PT: 19.3 sec;   INR: 1.72          PTT - ( 10 Aug 2023 04:14 )  PTT:41.0 sec  Urinalysis Basic - ( 10 Aug 2023 04:14 )    Color: x / Appearance: x / SG: x / pH: x  Gluc: 207 mg/dL / Ketone: x  / Bili: x / Urobili: x   Blood: x / Protein: x / Nitrite: x   Leuk Esterase: x / RBC: x / WBC x   Sq Epi: x / Non Sq Epi: x / Bacteria: x        MICROBIOLOGY:  reviewed     RADIOLOGY & ADDITIONAL STUDIES:  Reviewed

## 2023-08-10 NOTE — PROVIDER CONTACT NOTE (OTHER) - ACTION/TREATMENT ORDERED:
No orders at the moment.
SERGIO Chino made aware of BP/tachycardia. 1 L NS bolus orderd & initiated.
hold PO meds for now
RN attempted to replace IV but patient constantly moving, unable to make a good attempt at this time. Will endorse to oncoming shift, patient currently has a left 20G. Both were ultrasound guided.
Tylenol IVPB, cooling blanket. Blood cx completed by PA
As per neuro team, spoke with them twice, they want new dose given this morning and will order it for the morning. Order still pending for morning dose, endorsed to morning   RN.
Monitor UO for the next hour, no interventions at this time
No interventions at present. Titrate Nimbex gtt based on vent synchrony per MD Hendrix.
PA to perform blood cultures. Ice packs placed on pt & will reassess if cooling blanket needed. Will continue to monitor.
SERGIO Chino made aware of BP. Advised RN to continue monitoring pt. Care ongoing.
SERGIO Hernandez advised she will consult with nephrology and to continue to monitor the patients neuro status.
Same was done, now 155 SBP.
give 500cc bolus of NS, patient may need additional fluids.
Per MD order, pause tube feeds until 6am (4 hrs). No further interventions. Will continue to monitor.
B/l Wrist restraints order pending.
Per MD order, place pt back on VCAC. No further interventions ordered. Will continue to monitor. Paged respiratory to come assess pt breathing.

## 2023-08-10 NOTE — CONSULT NOTE ADULT - ASSESSMENT
68M with a pmhx of GBM s/p resection 1/27/2022 and Avastin 3/2023, T2DM, HLD, tracheal stenosis s/p tracheostomy who presented with AMS and found to have severe hyponatremia. His hospital stay was c/b septic shock due to K.pneumo bacteremia s/p treatment with CTX and Flagyl. Pt had 3 episodes of A-fib on 8/10/23. He is admitted for septic shock with ARDS likely 2/2 aspiration PNA found to have PsA, E. faecalis, and Clostridium perfringens.     Review of Studies     TELEMETRY:  A-fib began at 11am this morning, and than A-fib rhythm again at 1 pm, returned to NSR post amiodarone 	    ECG: previous A-fib episodes, rhythm irregularly irregular present on EKG readings from July 17, July 28, and August 10th      #A-fib w/ RVR   -Pt had 3 episodes of A-fib with RVR, episodes were treated with 3 mg Metroprolol tartrate, 5mg Metroprolol tartrate, and 150 mg of amiodarone respectively.   - in between episodes pt returned to NSR, and has remained in NSR post amiodarone  -A-fib most likely 2/2 to sepsis will continue to monitor for now  - We do not recommend giving continuous doses of amiodarone at this time, he is currently in NSR- amiodarone itself can cause side effects and has the potential to interact with other medications that pt is currently on   - would not indicate AC at this time due to high risk of bleeding   -treat underline problems, and consult if new changes  68M with a pmhx of GBM s/p resection 1/27/2022 and Avastin 3/2023, T2DM, HLD, tracheal stenosis s/p tracheostomy who presented with AMS and found to have severe hyponatremia. His hospital stay was c/b septic shock due to K.pneumo bacteremia s/p treatment with CTX and Flagyl. Pt had 3 episodes of A-fib/flutter on 8/10/23. He is admitted for septic shock with ARDS likely 2/2 aspiration PNA found to have PsA, E. faecalis, and Clostridium perfringens.     Review of Studies     TELEMETRY:  A-fib began at 11am this morning, and than A-fib rhythm again at 1 pm, returned to NSR post amiodarone 	    ECG: previous A-fib/flutter episodes present on EKG readings from July 17, July 28, and August 10th      #A-fib w/ RVR / flutter - likely in the setting of septic shock/ metabolic derangements vasopressors   -multiple EKG this admission noted to have A-fib/flutter   -Pt had 3 episodes of A-fib with RVR, episodes were treated with 3 mg Metroprolol tartrate, 5mg Metroprolol tartrate, and 150 mg of amiodarone respectively.   -pt converted to NSR with amiodarone bolus   -A-fib most likely 2/2 to sepsis will continue to monitor for now  -We do not recommend giving continuous doses of amiodarone at this time, he is currently in NSR- amiodarone itself can cause side effects and has the potential to interact with other medications that pt is currently on   -would optimally be on heparin drip due to risk of stroke but will defer for now in the setting of thrombocytopenia and DIC   -treat underline causes, and consult if new changes  68M with a pmhx of GBM s/p resection 1/27/2022 and Avastin 3/2023, T2DM, HLD, tracheal stenosis s/p tracheostomy who presented with AMS and found to have severe hyponatremia. His hospital stay was c/b septic shock due to K.pneumo bacteremia s/p treatment with CTX and Flagyl. Pt had 3 episodes of A-fib/flutter on 8/10/23. He is admitted for septic shock with ARDS likely 2/2 aspiration PNA found to have PsA, E. faecalis, and Clostridium perfringens.     Review of Studies     TELEMETRY:  A-fib began at 11am this morning, and than A-fib rhythm again at 1 pm, returned to NSR post amiodarone 	    ECG: previous A-fib/flutter episodes present on EKG readings from July 17, July 28, and August 10th      #A-fib w/ RVR / flutter - likely in the setting of septic shock/ metabolic derangements vasopressors   -multiple EKG this admission noted to have A-fib/flutter   -Pt had 3 episodes of A-fib/flutter with RVR, episodes were treated with 3 mg Metroprolol tartrate, 5mg Metroprolol tartrate, and 150 mg of amiodarone respectively.   -pt converted to NSR with amiodarone bolus   -A-fib most likely 2/2 to sepsis will continue to monitor for now  -We do not recommend giving additional doses of amiodarone at this time, he is currently in NSR- due to secondary side effects on liver (pts LFTs are already elevated) and interactions with current medications that the pt is already on   -would optimally be on heparin drip due to risk of stroke but will defer for now in the setting of thrombocytopenia and DIC   -treat underlying causes, and consult if new changes

## 2023-08-10 NOTE — CONSULT NOTE ADULT - SUBJECTIVE AND OBJECTIVE BOX
HPI:  Osiel Norwood is a 67YO male with a past medical history of type 2 diabetes, hyperlipidemia, iron deficiency anemia, tracheal stenosis s/p tracheostomy (which was closed by ENT 7 days ago); glioblastoma s/p resection 1/27/2022, and Avastin treatment 3/2023 presenting with altered mental status and weakness x2 days. Per family, they last saw him last night around 6pm when he was at baseline. Patient has intermittent expressive aphasia but is getting worse and now is persistent, and has intermittent nausea and vomiting since last night. Patient was supposed to receive an outpatient brain MRI on 7/25/2023.  Stroke code performed in  is negative for CVA. Head CT shows new hyperdensity in frontal parietal - surgical site. MRI is ordered to rule out tumor recurrence.   Per daughter at bedside, Entresto and Eliquis were stopped per his PC - cardiologist.      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 650 milliGRAM(s) Oral every 6 hours PRN  acetylcysteine 10%  Inhalation 4 milliLiter(s) Inhalation every 6 hours  albuterol/ipratropium for Nebulization 3 milliLiter(s) Nebulizer every 6 hours  aMIOdarone Infusion 1 mG/Min IV Continuous <Continuous>  atorvastatin 20 milliGRAM(s) Oral at bedtime  chlorhexidine 0.12% Liquid 15 milliLiter(s) Oral Mucosa every 12 hours  chlorhexidine 2% Cloths 1 Application(s) Topical <User Schedule>  chlorhexidine 4% Liquid 1 Application(s) Topical <User Schedule>  cisatracurium Infusion 3 MICROgram(s)/kG/Min IV Continuous <Continuous>  fentaNYL    Injectable 50 MICROGram(s) IV Push every 2 hours PRN  fentaNYL   Infusion. 0.5 MICROgram(s)/kG/Hr IV Continuous <Continuous>  fludroCORTISONE 0.2 milliGRAM(s) Oral daily  hydrocortisone sodium succinate Injectable 50 milliGRAM(s) IV Push every 6 hours  insulin lispro (ADMELOG) corrective regimen sliding scale   SubCutaneous every 6 hours  lacosamide IVPB 50 milliGRAM(s) IV Intermittent every 12 hours  lactobacillus acidophilus 1 Tablet(s) Oral daily  metoclopramide Injectable 10 milliGRAM(s) IV Push every 8 hours  metoprolol tartrate Injectable 5 milliGRAM(s) IV Push once  multivitamin 1 Tablet(s) Oral daily  norepinephrine Infusion 0.05 MICROgram(s)/kG/Min IV Continuous <Continuous>  pantoprazole  Injectable 40 milliGRAM(s) IV Push every 12 hours  petrolatum Ophthalmic Ointment 1 Application(s) Both EYES three times a day  phenylephrine    Infusion 0.2 MICROgram(s)/kG/Min IV Continuous <Continuous>  phytonadione  IVPB 10 milliGRAM(s) IV Intermittent daily  piperacillin/tazobactam IVPB.. 4.5 Gram(s) IV Intermittent every 8 hours  propofol Infusion 50 MICROgram(s)/kG/Min IV Continuous <Continuous>  simethicone 80 milliGRAM(s) Chew every 6 hours  sodium chloride 3 Gram(s) Oral every 6 hours  sodium chloride 0.9% lock flush 10 milliLiter(s) IV Push every 1 hour PRN  sodium chloride 0.9% lock flush 10 milliLiter(s) IV Push every 1 hour PRN  sodium chloride 3%  Inhalation 4 milliLiter(s) Inhalation every 6 hours  sodium chloride 3%. 500 milliLiter(s) IV Continuous <Continuous>  sucralfate suspension 1 Gram(s) Oral every 6 hours  vasopressin Infusion 0.04 Unit(s)/Min IV Continuous <Continuous>      T(C): 37.5 (08-10-23 @ 09:07), Max: 38.7 (08-09-23 @ 15:00)  HR: 113 (08-10-23 @ 13:00) (97 - 142)  BP: 118/70 (08-10-23 @ 11:05) (115/59 - 121/57)  RR: 22 (08-10-23 @ 13:00) (22 - 24)  SpO2: 99% (08-10-23 @ 13:00) (96% - 99%)    08-09-23 @ 07:01  -  08-10-23 @ 07:00  --------------------------------------------------------  IN: 4107.2 mL / OUT: 2290 mL / NET: 1817.2 mL    08-10-23 @ 07:01  -  08-10-23 @ 13:57  --------------------------------------------------------  IN: 1139 mL / OUT: 590 mL / NET: 549 mL        PHYSICAL EXAM:    Constitutional: resting comfortably in bed; NAD  HEENT: NC/AT, PERRL, EOMI, anicteric sclera, no nasal discharge; uvula midline, no oropharyngeal erythema or exudates; MMM  Neck: supple; no thyromegaly, JVP ? cm H20, JVD +/-  Respiratory: CTA B/L; no W/R/R, no retractions  Cardiac: +S1/S2; RRR; no M/R/G; PMI non-displaced  Gastrointestinal: soft, NT/ND; no rebound or guarding; +BSx4  Extremities: WWP, no clubbing or cyanosis; no peripheral edema  Musculoskeletal: NROM x4; no joint swelling, tenderness or erythema  Vascular: 2+ radial, DP/PT pulses B/L  Dermatologic: skin warm, dry and intact; no rashes, wounds, or scars  Lymphatic: no submandibular or cervical LAD  Neurologic: AAOx3; CNII-XII grossly intact; no focal deficits        I&O's Summary    09 Aug 2023 07:01  -  10 Aug 2023 07:00  --------------------------------------------------------  IN: 4107.2 mL / OUT: 2290 mL / NET: 1817.2 mL    10 Aug 2023 07:01  -  10 Aug 2023 13:57  --------------------------------------------------------  IN: 1139 mL / OUT: 590 mL / NET: 549 mL        LABS:	 	                        7.9    11.91 )-----------( 50       ( 10 Aug 2023 11:22 )             24.4     08-10    136  |  109<H>  |  16  ----------------------------<  207<H>  4.0   |  18<L>  |  0.48<L>    Ca    7.2<L>      10 Aug 2023 04:14  Phos  2.8     08-10  Mg     1.7     08-10    TPro  4.1<L>  /  Alb  1.7<L>  /  TBili  2.3<H>  /  DBili  x   /  AST  42<H>  /  ALT  22  /  AlkPhos  73  08-09    CARDIAC MARKERS ( 08 Aug 2023 20:10 )  x     / x     / 52 U/L / x     / 1.0 ng/mL      proBNP:   Lipid Profile:   HgA1c:   TSH:     TELEMETRY: 	    ECG:  	  RADIOLOGY:   ECHO:  STRESS:  CATH:   HPI:  Osiel Norwood is a 69YO male with a past medical history of type 2 diabetes, hyperlipidemia, iron deficiency anemia, tracheal stenosis s/p tracheostomy (which was closed by ENT 7 days ago); glioblastoma s/p resection 1/27/2022, and Avastin treatment 3/2023 presenting with altered mental status and weakness x2 days. Per family, they last saw him last night around 6pm when he was at baseline. Patient has intermittent expressive aphasia but is getting worse and now is persistent, and has intermittent nausea and vomiting since last night. Patient was supposed to receive an outpatient brain MRI on 7/25/2023. Stroke code performed in is negative for CVA. Head CT shows new hyperdensity in frontal parietal - surgical site. MRI is ordered to rule out tumor recurrence.   Per daughter at bedside, Entresto and Eliquis were stopped per his PC - cardiologist.      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 650 milliGRAM(s) Oral every 6 hours PRN  acetylcysteine 10%  Inhalation 4 milliLiter(s) Inhalation every 6 hours  albuterol/ipratropium for Nebulization 3 milliLiter(s) Nebulizer every 6 hours  aMIOdarone Infusion 1 mG/Min IV Continuous <Continuous>  atorvastatin 20 milliGRAM(s) Oral at bedtime  chlorhexidine 0.12% Liquid 15 milliLiter(s) Oral Mucosa every 12 hours  chlorhexidine 2% Cloths 1 Application(s) Topical <User Schedule>  chlorhexidine 4% Liquid 1 Application(s) Topical <User Schedule>  cisatracurium Infusion 3 MICROgram(s)/kG/Min IV Continuous <Continuous>  fentaNYL    Injectable 50 MICROGram(s) IV Push every 2 hours PRN  fentaNYL   Infusion. 0.5 MICROgram(s)/kG/Hr IV Continuous <Continuous>  fludroCORTISONE 0.2 milliGRAM(s) Oral daily  hydrocortisone sodium succinate Injectable 50 milliGRAM(s) IV Push every 6 hours  insulin lispro (ADMELOG) corrective regimen sliding scale   SubCutaneous every 6 hours  lacosamide IVPB 50 milliGRAM(s) IV Intermittent every 12 hours  lactobacillus acidophilus 1 Tablet(s) Oral daily  metoclopramide Injectable 10 milliGRAM(s) IV Push every 8 hours  metoprolol tartrate Injectable 5 milliGRAM(s) IV Push once  multivitamin 1 Tablet(s) Oral daily  norepinephrine Infusion 0.05 MICROgram(s)/kG/Min IV Continuous <Continuous>  pantoprazole  Injectable 40 milliGRAM(s) IV Push every 12 hours  petrolatum Ophthalmic Ointment 1 Application(s) Both EYES three times a day  phenylephrine    Infusion 0.2 MICROgram(s)/kG/Min IV Continuous <Continuous>  phytonadione  IVPB 10 milliGRAM(s) IV Intermittent daily  piperacillin/tazobactam IVPB.. 4.5 Gram(s) IV Intermittent every 8 hours  propofol Infusion 50 MICROgram(s)/kG/Min IV Continuous <Continuous>  simethicone 80 milliGRAM(s) Chew every 6 hours  sodium chloride 3 Gram(s) Oral every 6 hours  sodium chloride 0.9% lock flush 10 milliLiter(s) IV Push every 1 hour PRN  sodium chloride 0.9% lock flush 10 milliLiter(s) IV Push every 1 hour PRN  sodium chloride 3%  Inhalation 4 milliLiter(s) Inhalation every 6 hours  sodium chloride 3%. 500 milliLiter(s) IV Continuous <Continuous>  sucralfate suspension 1 Gram(s) Oral every 6 hours  vasopressin Infusion 0.04 Unit(s)/Min IV Continuous <Continuous>      T(C): 37.5 (08-10-23 @ 09:07), Max: 38.7 (08-09-23 @ 15:00)  HR: 113 (08-10-23 @ 13:00) (97 - 142)  BP: 118/70 (08-10-23 @ 11:05) (115/59 - 121/57)  RR: 22 (08-10-23 @ 13:00) (22 - 24)  SpO2: 99% (08-10-23 @ 13:00) (96% - 99%)    08-09-23 @ 07:01  -  08-10-23 @ 07:00  --------------------------------------------------------  IN: 4107.2 mL / OUT: 2290 mL / NET: 1817.2 mL    08-10-23 @ 07:01  -  08-10-23 @ 13:57  --------------------------------------------------------  IN: 1139 mL / OUT: 590 mL / NET: 549 mL        PHYSICAL EXAM:    Constitutional: resting comfortably in bed; NAD  HEENT: NC/AT, PERRL, EOMI, anicteric sclera, no nasal discharge; uvula midline, no oropharyngeal erythema or exudates; MMM  Neck: supple; no thyromegaly, JVP ? cm H20, JVD +/-  Respiratory: CTA B/L; no W/R/R, no retractions  Cardiac: +S1/S2; RRR; no M/R/G; PMI non-displaced  Gastrointestinal: soft, NT/ND; no rebound or guarding; +BSx4  Extremities: WWP, no clubbing or cyanosis; no peripheral edema  Musculoskeletal: NROM x4; no joint swelling, tenderness or erythema  Vascular: 2+ radial, DP/PT pulses B/L  Dermatologic: skin warm, dry and intact; no rashes, wounds, or scars  Lymphatic: no submandibular or cervical LAD  Neurologic: AAOx3; CNII-XII grossly intact; no focal deficits        I&O's Summary    09 Aug 2023 07:01  -  10 Aug 2023 07:00  --------------------------------------------------------  IN: 4107.2 mL / OUT: 2290 mL / NET: 1817.2 mL    10 Aug 2023 07:01  -  10 Aug 2023 13:57  --------------------------------------------------------  IN: 1139 mL / OUT: 590 mL / NET: 549 mL        LABS:	 	                        7.9    11.91 )-----------( 50       ( 10 Aug 2023 11:22 )             24.4     08-10    136  |  109<H>  |  16  ----------------------------<  207<H>  4.0   |  18<L>  |  0.48<L>    Ca    7.2<L>      10 Aug 2023 04:14  Phos  2.8     08-10  Mg     1.7     08-10    TPro  4.1<L>  /  Alb  1.7<L>  /  TBili  2.3<H>  /  DBili  x   /  AST  42<H>  /  ALT  22  /  AlkPhos  73  08-09    CARDIAC MARKERS ( 08 Aug 2023 20:10 )  x     / x     / 52 U/L / x     / 1.0 ng/mL      proBNP:   Lipid Profile:   HgA1c:   TSH:     TELEMETRY: 	    ECG:  	  RADIOLOGY:   ECHO:  STRESS:  CATH:   HPI:  Osiel Norwood is a 67YO male with a past medical history of type 2 diabetes, hyperlipidemia, iron deficiency anemia, tracheal stenosis s/p tracheostomy (which was closed by ENT 7 days ago); glioblastoma s/p resection 1/27/2022, and Avastin treatment 3/2023 presenting with altered mental status and weakness x2 days. Per family, they last saw him last night around 6pm when he was at baseline. Patient has intermittent expressive aphasia but is getting worse and now is persistent, and has intermittent nausea and vomiting since last night. Patient was supposed to receive an outpatient brain MRI on 7/25/2023. Stroke code called in ED, Head CT shows new hyperdensity in frontal parietal - surgical site. MRI is ordered to rule out tumor recurrence. Per daughter at bedside, Entresto and Eliquis were stopped per his PC - cardiologist. Cultures are positive for Pseudomonas, unknown source of infection. Pt started having new onset      (07 Jul 2023 13:10)    Pt seen and examined at bedside. 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 650 milliGRAM(s) Oral every 6 hours PRN  acetylcysteine 10%  Inhalation 4 milliLiter(s) Inhalation every 6 hours  albuterol/ipratropium for Nebulization 3 milliLiter(s) Nebulizer every 6 hours  aMIOdarone Infusion 1 mG/Min IV Continuous <Continuous>  atorvastatin 20 milliGRAM(s) Oral at bedtime  chlorhexidine 0.12% Liquid 15 milliLiter(s) Oral Mucosa every 12 hours  chlorhexidine 2% Cloths 1 Application(s) Topical <User Schedule>  chlorhexidine 4% Liquid 1 Application(s) Topical <User Schedule>  cisatracurium Infusion 3 MICROgram(s)/kG/Min IV Continuous <Continuous>  fentaNYL    Injectable 50 MICROGram(s) IV Push every 2 hours PRN  fentaNYL   Infusion. 0.5 MICROgram(s)/kG/Hr IV Continuous <Continuous>  fludroCORTISONE 0.2 milliGRAM(s) Oral daily  hydrocortisone sodium succinate Injectable 50 milliGRAM(s) IV Push every 6 hours  insulin lispro (ADMELOG) corrective regimen sliding scale   SubCutaneous every 6 hours  lacosamide IVPB 50 milliGRAM(s) IV Intermittent every 12 hours  lactobacillus acidophilus 1 Tablet(s) Oral daily  metoclopramide Injectable 10 milliGRAM(s) IV Push every 8 hours  metoprolol tartrate Injectable 5 milliGRAM(s) IV Push once  multivitamin 1 Tablet(s) Oral daily  norepinephrine Infusion 0.05 MICROgram(s)/kG/Min IV Continuous <Continuous>  pantoprazole  Injectable 40 milliGRAM(s) IV Push every 12 hours  petrolatum Ophthalmic Ointment 1 Application(s) Both EYES three times a day  phenylephrine    Infusion 0.2 MICROgram(s)/kG/Min IV Continuous <Continuous>  phytonadione  IVPB 10 milliGRAM(s) IV Intermittent daily  piperacillin/tazobactam IVPB.. 4.5 Gram(s) IV Intermittent every 8 hours  propofol Infusion 50 MICROgram(s)/kG/Min IV Continuous <Continuous>  simethicone 80 milliGRAM(s) Chew every 6 hours  sodium chloride 3 Gram(s) Oral every 6 hours  sodium chloride 0.9% lock flush 10 milliLiter(s) IV Push every 1 hour PRN  sodium chloride 0.9% lock flush 10 milliLiter(s) IV Push every 1 hour PRN  sodium chloride 3%  Inhalation 4 milliLiter(s) Inhalation every 6 hours  sodium chloride 3%. 500 milliLiter(s) IV Continuous <Continuous>  sucralfate suspension 1 Gram(s) Oral every 6 hours  vasopressin Infusion 0.04 Unit(s)/Min IV Continuous <Continuous>      T(C): 37.5 (08-10-23 @ 09:07), Max: 38.7 (08-09-23 @ 15:00)  HR: 113 (08-10-23 @ 13:00) (97 - 142)  BP: 118/70 (08-10-23 @ 11:05) (115/59 - 121/57)  RR: 22 (08-10-23 @ 13:00) (22 - 24)  SpO2: 99% (08-10-23 @ 13:00) (96% - 99%)    08-09-23 @ 07:01  -  08-10-23 @ 07:00  --------------------------------------------------------  IN: 4107.2 mL / OUT: 2290 mL / NET: 1817.2 mL    08-10-23 @ 07:01  -  08-10-23 @ 13:57  --------------------------------------------------------  IN: 1139 mL / OUT: 590 mL / NET: 549 mL        PHYSICAL EXAM:    Constitutional: resting comfortably in bed; NAD  HEENT: NC/AT, PERRL, EOMI, anicteric sclera, no nasal discharge; uvula midline, no oropharyngeal erythema or exudates; MMM  Neck: supple; no thyromegaly, JVP ? cm H20, JVD +/-  Respiratory: CTA B/L; no W/R/R, no retractions  Cardiac: +S1/S2; RRR; no M/R/G; PMI non-displaced  Gastrointestinal: soft, NT/ND; no rebound or guarding; +BSx4  Extremities: WWP, no clubbing or cyanosis; no peripheral edema  Musculoskeletal: NROM x4; no joint swelling, tenderness or erythema  Vascular: 2+ radial, DP/PT pulses B/L  Dermatologic: skin warm, dry and intact; no rashes, wounds, or scars  Lymphatic: no submandibular or cervical LAD  Neurologic: AAOx3; CNII-XII grossly intact; no focal deficits        I&O's Summary    09 Aug 2023 07:01  -  10 Aug 2023 07:00  --------------------------------------------------------  IN: 4107.2 mL / OUT: 2290 mL / NET: 1817.2 mL    10 Aug 2023 07:01  -  10 Aug 2023 13:57  --------------------------------------------------------  IN: 1139 mL / OUT: 590 mL / NET: 549 mL        LABS:	 	                        7.9    11.91 )-----------( 50       ( 10 Aug 2023 11:22 )             24.4     08-10    136  |  109<H>  |  16  ----------------------------<  207<H>  4.0   |  18<L>  |  0.48<L>    Ca    7.2<L>      10 Aug 2023 04:14  Phos  2.8     08-10  Mg     1.7     08-10    TPro  4.1<L>  /  Alb  1.7<L>  /  TBili  2.3<H>  /  DBili  x   /  AST  42<H>  /  ALT  22  /  AlkPhos  73  08-09    CARDIAC MARKERS ( 08 Aug 2023 20:10 )  x     / x     / 52 U/L / x     / 1.0 ng/mL      proBNP:   Lipid Profile:   HgA1c:   TSH:     TELEMETRY: 	    ECG:  	  RADIOLOGY:   ECHO:  STRESS:  CATH:   HPI:  Osiel Norwood is a 69YO male with a past medical history of type 2 diabetes, hyperlipidemia, iron deficiency anemia, tracheal stenosis s/p tracheostomy (which was closed by ENT 7 days ago); glioblastoma s/p resection 1/27/2022, and Avastin treatment 3/2023 presenting with altered mental status and weakness x2 days. Per family, they last saw him last night around 6pm when he was at baseline. Patient has intermittent expressive aphasia but is getting worse and now is persistent, and has intermittent nausea and vomiting since last night. Patient was supposed to receive an outpatient brain MRI on 7/25/2023. Stroke code called in ED, Head CT shows new hyperdensity in frontal parietal - surgical site. MRI is ordered to rule out tumor recurrence. Per daughter at bedside, Entresto and Eliquis were stopped per his PC - cardiologist. Cultures are positive for Pseudomonas, unknown source of infection.   (07 Jul 2023 13:10)    Pt seen and examined at bedside. 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 650 milliGRAM(s) Oral every 6 hours PRN  acetylcysteine 10%  Inhalation 4 milliLiter(s) Inhalation every 6 hours  albuterol/ipratropium for Nebulization 3 milliLiter(s) Nebulizer every 6 hours  aMIOdarone Infusion 1 mG/Min IV Continuous <Continuous>  atorvastatin 20 milliGRAM(s) Oral at bedtime  chlorhexidine 0.12% Liquid 15 milliLiter(s) Oral Mucosa every 12 hours  chlorhexidine 2% Cloths 1 Application(s) Topical <User Schedule>  chlorhexidine 4% Liquid 1 Application(s) Topical <User Schedule>  cisatracurium Infusion 3 MICROgram(s)/kG/Min IV Continuous <Continuous>  fentaNYL    Injectable 50 MICROGram(s) IV Push every 2 hours PRN  fentaNYL   Infusion. 0.5 MICROgram(s)/kG/Hr IV Continuous <Continuous>  fludroCORTISONE 0.2 milliGRAM(s) Oral daily  hydrocortisone sodium succinate Injectable 50 milliGRAM(s) IV Push every 6 hours  insulin lispro (ADMELOG) corrective regimen sliding scale   SubCutaneous every 6 hours  lacosamide IVPB 50 milliGRAM(s) IV Intermittent every 12 hours  lactobacillus acidophilus 1 Tablet(s) Oral daily  metoclopramide Injectable 10 milliGRAM(s) IV Push every 8 hours  metoprolol tartrate Injectable 5 milliGRAM(s) IV Push once  multivitamin 1 Tablet(s) Oral daily  norepinephrine Infusion 0.05 MICROgram(s)/kG/Min IV Continuous <Continuous>  pantoprazole  Injectable 40 milliGRAM(s) IV Push every 12 hours  petrolatum Ophthalmic Ointment 1 Application(s) Both EYES three times a day  phenylephrine    Infusion 0.2 MICROgram(s)/kG/Min IV Continuous <Continuous>  phytonadione  IVPB 10 milliGRAM(s) IV Intermittent daily  piperacillin/tazobactam IVPB.. 4.5 Gram(s) IV Intermittent every 8 hours  propofol Infusion 50 MICROgram(s)/kG/Min IV Continuous <Continuous>  simethicone 80 milliGRAM(s) Chew every 6 hours  sodium chloride 3 Gram(s) Oral every 6 hours  sodium chloride 0.9% lock flush 10 milliLiter(s) IV Push every 1 hour PRN  sodium chloride 0.9% lock flush 10 milliLiter(s) IV Push every 1 hour PRN  sodium chloride 3%  Inhalation 4 milliLiter(s) Inhalation every 6 hours  sodium chloride 3%. 500 milliLiter(s) IV Continuous <Continuous>  sucralfate suspension 1 Gram(s) Oral every 6 hours  vasopressin Infusion 0.04 Unit(s)/Min IV Continuous <Continuous>      T(C): 37.5 (08-10-23 @ 09:07), Max: 38.7 (08-09-23 @ 15:00)  HR: 113 (08-10-23 @ 13:00) (97 - 142)  BP: 118/70 (08-10-23 @ 11:05) (115/59 - 121/57)  RR: 22 (08-10-23 @ 13:00) (22 - 24)  SpO2: 99% (08-10-23 @ 13:00) (96% - 99%)    08-09-23 @ 07:01  -  08-10-23 @ 07:00  --------------------------------------------------------  IN: 4107.2 mL / OUT: 2290 mL / NET: 1817.2 mL    08-10-23 @ 07:01  -  08-10-23 @ 13:57  --------------------------------------------------------  IN: 1139 mL / OUT: 590 mL / NET: 549 mL        PHYSICAL EXAM:    Constitutional: resting comfortably in bed; NAD  HEENT: NC/AT, PERRL, EOMI, anicteric sclera, no nasal discharge; uvula midline, no oropharyngeal erythema or exudates; MMM  Neck: supple; no thyromegaly, JVP ? cm H20, JVD +/-  Respiratory: CTA B/L; no W/R/R, no retractions  Cardiac: +S1/S2; RRR; no M/R/G; PMI non-displaced  Gastrointestinal: soft, NT/ND; no rebound or guarding; +BSx4  Extremities: WWP, no clubbing or cyanosis; no peripheral edema  Musculoskeletal: NROM x4; no joint swelling, tenderness or erythema  Vascular: 2+ radial, DP/PT pulses B/L  Dermatologic: skin warm, dry and intact; no rashes, wounds, or scars  Lymphatic: no submandibular or cervical LAD  Neurologic: AAOx3; CNII-XII grossly intact; no focal deficits        I&O's Summary    09 Aug 2023 07:01  -  10 Aug 2023 07:00  --------------------------------------------------------  IN: 4107.2 mL / OUT: 2290 mL / NET: 1817.2 mL    10 Aug 2023 07:01  -  10 Aug 2023 13:57  --------------------------------------------------------  IN: 1139 mL / OUT: 590 mL / NET: 549 mL        LABS:	 	                        7.9    11.91 )-----------( 50       ( 10 Aug 2023 11:22 )             24.4     08-10    136  |  109<H>  |  16  ----------------------------<  207<H>  4.0   |  18<L>  |  0.48<L>    Ca    7.2<L>      10 Aug 2023 04:14  Phos  2.8     08-10  Mg     1.7     08-10    TPro  4.1<L>  /  Alb  1.7<L>  /  TBili  2.3<H>  /  DBili  x   /  AST  42<H>  /  ALT  22  /  AlkPhos  73  08-09    CARDIAC MARKERS ( 08 Aug 2023 20:10 )  x     / x     / 52 U/L / x     / 1.0 ng/mL      proBNP:   Lipid Profile:   HgA1c:   TSH:     TELEMETRY: 	    ECG:  	  RADIOLOGY:   ECHO:  STRESS:  CATH:   HPI:  Osiel Norwood is a 67YO male with a past medical history of type 2 diabetes, hyperlipidemia, iron deficiency anemia, tracheal stenosis s/p tracheostomy (which was closed by ENT 7 days ago); glioblastoma s/p resection 1/27/2022, and Avastin treatment 3/2023 presenting with altered mental status and weakness x2 days. Per family, they last saw him last night around 6pm when he was at baseline. Patient has intermittent expressive aphasia but is getting worse and now is persistent, and has intermittent nausea and vomiting since last night. Patient was supposed to receive an outpatient brain MRI on 7/25/2023. Stroke code called in ED, Head CT shows new hyperdensity in frontal parietal - surgical site. MRI is ordered to rule out tumor recurrence. Per daughter at bedside, Entresto and Eliquis were stopped per his PC - cardiologist. Cultures are positive for Pseudomonas, unknown source of infection.   (07 Jul 2023 13:10)    Pt seen and examined at bedside. He remains sedated and intubated. ALFIE -2. Cardiology was consulted today, for new onset A-fib/flutter with HR in the ranging from 120-140s. He converted to NSR after 3mg Metroprolol tartrate IV but had 2 more episodes of A-fib w/ RVR treated with 5 mg Metroprolol tartrate IV and Amiodarone 150 mg IVPB. He remains in NSR post amiodarone with a rate in the 80s. No other changes at this time.       ROS: not able to obtain as pt is intubated and sedated.     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 650 milliGRAM(s) Oral every 6 hours PRN  acetylcysteine 10%  Inhalation 4 milliLiter(s) Inhalation every 6 hours  albuterol/ipratropium for Nebulization 3 milliLiter(s) Nebulizer every 6 hours  aMIOdarone Infusion 1 mG/Min IV Continuous <Continuous>  atorvastatin 20 milliGRAM(s) Oral at bedtime  chlorhexidine 0.12% Liquid 15 milliLiter(s) Oral Mucosa every 12 hours  chlorhexidine 2% Cloths 1 Application(s) Topical <User Schedule>  chlorhexidine 4% Liquid 1 Application(s) Topical <User Schedule>  cisatracurium Infusion 3 MICROgram(s)/kG/Min IV Continuous <Continuous>  fentaNYL    Injectable 50 MICROGram(s) IV Push every 2 hours PRN  fentaNYL   Infusion. 0.5 MICROgram(s)/kG/Hr IV Continuous <Continuous>  fludroCORTISONE 0.2 milliGRAM(s) Oral daily  hydrocortisone sodium succinate Injectable 50 milliGRAM(s) IV Push every 6 hours  insulin lispro (ADMELOG) corrective regimen sliding scale   SubCutaneous every 6 hours  lacosamide IVPB 50 milliGRAM(s) IV Intermittent every 12 hours  lactobacillus acidophilus 1 Tablet(s) Oral daily  metoclopramide Injectable 10 milliGRAM(s) IV Push every 8 hours  metoprolol tartrate Injectable 5 milliGRAM(s) IV Push once  multivitamin 1 Tablet(s) Oral daily  norepinephrine Infusion 0.05 MICROgram(s)/kG/Min IV Continuous <Continuous>  pantoprazole  Injectable 40 milliGRAM(s) IV Push every 12 hours  petrolatum Ophthalmic Ointment 1 Application(s) Both EYES three times a day  phenylephrine    Infusion 0.2 MICROgram(s)/kG/Min IV Continuous <Continuous>  phytonadione  IVPB 10 milliGRAM(s) IV Intermittent daily  piperacillin/tazobactam IVPB.. 4.5 Gram(s) IV Intermittent every 8 hours  propofol Infusion 50 MICROgram(s)/kG/Min IV Continuous <Continuous>  simethicone 80 milliGRAM(s) Chew every 6 hours  sodium chloride 3 Gram(s) Oral every 6 hours  sodium chloride 0.9% lock flush 10 milliLiter(s) IV Push every 1 hour PRN  sodium chloride 0.9% lock flush 10 milliLiter(s) IV Push every 1 hour PRN  sodium chloride 3%  Inhalation 4 milliLiter(s) Inhalation every 6 hours  sodium chloride 3%. 500 milliLiter(s) IV Continuous <Continuous>  sucralfate suspension 1 Gram(s) Oral every 6 hours  vasopressin Infusion 0.04 Unit(s)/Min IV Continuous <Continuous>      T(C): 37.5 (08-10-23 @ 09:07), Max: 38.7 (08-09-23 @ 15:00)  HR: 113 (08-10-23 @ 13:00) (97 - 142)  BP: 118/70 (08-10-23 @ 11:05) (115/59 - 121/57)  RR: 22 (08-10-23 @ 13:00) (22 - 24)  SpO2: 99% (08-10-23 @ 13:00) (96% - 99%)    08-09-23 @ 07:01  -  08-10-23 @ 07:00  --------------------------------------------------------  IN: 4107.2 mL / OUT: 2290 mL / NET: 1817.2 mL    08-10-23 @ 07:01  -  08-10-23 @ 13:57  --------------------------------------------------------  IN: 1139 mL / OUT: 590 mL / NET: 549 mL        PHYSICAL EXAM:    Constitutional: resting intubated and sedated   Respiratory: CTA B/L; no W/R/R, no retractions  Cardiac: +S1/S2; RRR; no M/R/G; PMI non-displaced  Extremities: WWP, no clubbing or cyanosis; 2+ edema more prominent on the shins b/l than ankles       I&O's Summary    09 Aug 2023 07:01  -  10 Aug 2023 07:00  --------------------------------------------------------  IN: 4107.2 mL / OUT: 2290 mL / NET: 1817.2 mL    10 Aug 2023 07:01  -  10 Aug 2023 13:57  --------------------------------------------------------  IN: 1139 mL / OUT: 590 mL / NET: 549 mL        LABS:	 	                        7.9    11.91 )-----------( 50       ( 10 Aug 2023 11:22 )             24.4     08-10    136  |  109<H>  |  16  ----------------------------<  207<H>  4.0   |  18<L>  |  0.48<L>    Ca    7.2<L>      10 Aug 2023 04:14  Phos  2.8     08-10  Mg     1.7     08-10    TPro  4.1<L>  /  Alb  1.7<L>  /  TBili  2.3<H>  /  DBili  x   /  AST  42<H>  /  ALT  22  /  AlkPhos  73  08-09    CARDIAC MARKERS ( 08 Aug 2023 20:10 )  x     / x     / 52 U/L / x     / 1.0 ng/mL      TELEMETRY:  A-fib/flutter at 11am this morning, and than A-fib/flutter rhythm again at 1 pm, returned to NSR post amiodarone 	    ECG: previous episodes of A-fib/flutter resent on EKG readings from earlier dates July 17, July 28, and August 10th   HPI:  Osiel Norwood is a 69YO male with a past medical history of type 2 diabetes, hyperlipidemia, iron deficiency anemia, tracheal stenosis s/p tracheostomy (which was closed by ENT 7 days ago); glioblastoma s/p resection 1/27/2022, and Avastin treatment 3/2023 presenting with altered mental status and weakness x2 days. Per family, they last saw him last night around 6pm when he was at baseline. Patient has intermittent expressive aphasia but is getting worse and now is persistent, and has intermittent nausea and vomiting since last night. Patient was supposed to receive an outpatient brain MRI on 7/25/2023. Stroke code called in ED, Head CT shows new hyperdensity in frontal parietal - surgical site. MRI is ordered to rule out tumor recurrence. Per daughter at bedside, Entresto and Eliquis were stopped per his PC - cardiologist. Cultures are positive for Pseudomonas, unknown source of infection.   (07 Jul 2023 13:10)    Pt seen and examined at bedside. He remains sedated and intubated. ALFIE -2. Cardiology was consulted today, for new onset A-fib/flutter with HR in the ranging from 120-140s. He converted to NSR after 3mg Metroprolol tartrate IV but had 2 more episodes of A-fib w/ RVR treated with 5 mg Metroprolol tartrate IV and Amiodarone 150 mg IVPB. He remains in NSR post amiodarone with a rate in the 80s. No other changes at this time.       ROS: not able to obtain as pt is intubated and sedated.     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 650 milliGRAM(s) Oral every 6 hours PRN  acetylcysteine 10%  Inhalation 4 milliLiter(s) Inhalation every 6 hours  albuterol/ipratropium for Nebulization 3 milliLiter(s) Nebulizer every 6 hours  aMIOdarone Infusion 1 mG/Min IV Continuous <Continuous>  atorvastatin 20 milliGRAM(s) Oral at bedtime  chlorhexidine 0.12% Liquid 15 milliLiter(s) Oral Mucosa every 12 hours  chlorhexidine 2% Cloths 1 Application(s) Topical <User Schedule>  chlorhexidine 4% Liquid 1 Application(s) Topical <User Schedule>  cisatracurium Infusion 3 MICROgram(s)/kG/Min IV Continuous <Continuous>  fentaNYL    Injectable 50 MICROGram(s) IV Push every 2 hours PRN  fentaNYL   Infusion. 0.5 MICROgram(s)/kG/Hr IV Continuous <Continuous>  fludroCORTISONE 0.2 milliGRAM(s) Oral daily  hydrocortisone sodium succinate Injectable 50 milliGRAM(s) IV Push every 6 hours  insulin lispro (ADMELOG) corrective regimen sliding scale   SubCutaneous every 6 hours  lacosamide IVPB 50 milliGRAM(s) IV Intermittent every 12 hours  lactobacillus acidophilus 1 Tablet(s) Oral daily  metoclopramide Injectable 10 milliGRAM(s) IV Push every 8 hours  metoprolol tartrate Injectable 5 milliGRAM(s) IV Push once  multivitamin 1 Tablet(s) Oral daily  norepinephrine Infusion 0.05 MICROgram(s)/kG/Min IV Continuous <Continuous>  pantoprazole  Injectable 40 milliGRAM(s) IV Push every 12 hours  petrolatum Ophthalmic Ointment 1 Application(s) Both EYES three times a day  phenylephrine    Infusion 0.2 MICROgram(s)/kG/Min IV Continuous <Continuous>  phytonadione  IVPB 10 milliGRAM(s) IV Intermittent daily  piperacillin/tazobactam IVPB.. 4.5 Gram(s) IV Intermittent every 8 hours  propofol Infusion 50 MICROgram(s)/kG/Min IV Continuous <Continuous>  simethicone 80 milliGRAM(s) Chew every 6 hours  sodium chloride 3 Gram(s) Oral every 6 hours  sodium chloride 0.9% lock flush 10 milliLiter(s) IV Push every 1 hour PRN  sodium chloride 0.9% lock flush 10 milliLiter(s) IV Push every 1 hour PRN  sodium chloride 3%  Inhalation 4 milliLiter(s) Inhalation every 6 hours  sodium chloride 3%. 500 milliLiter(s) IV Continuous <Continuous>  sucralfate suspension 1 Gram(s) Oral every 6 hours  vasopressin Infusion 0.04 Unit(s)/Min IV Continuous <Continuous>      T(C): 37.5 (08-10-23 @ 09:07), Max: 38.7 (08-09-23 @ 15:00)  HR: 113 (08-10-23 @ 13:00) (97 - 142)  BP: 118/70 (08-10-23 @ 11:05) (115/59 - 121/57)  RR: 22 (08-10-23 @ 13:00) (22 - 24)  SpO2: 99% (08-10-23 @ 13:00) (96% - 99%)    08-09-23 @ 07:01  -  08-10-23 @ 07:00  --------------------------------------------------------  IN: 4107.2 mL / OUT: 2290 mL / NET: 1817.2 mL    08-10-23 @ 07:01  -  08-10-23 @ 13:57  --------------------------------------------------------  IN: 1139 mL / OUT: 590 mL / NET: 549 mL        PHYSICAL EXAM:    Constitutional: resting intubated and sedated   Respiratory: CTA B/L; no W/R/R, no retractions  Cardiac: +S1/S2; RRR; no M/R/G; PMI non-displaced  Extremities: WWP, no clubbing or cyanosis; 2+ edema more prominent on the shins b/l than ankles       I&O's Summary    09 Aug 2023 07:01  -  10 Aug 2023 07:00  --------------------------------------------------------  IN: 4107.2 mL / OUT: 2290 mL / NET: 1817.2 mL    10 Aug 2023 07:01  -  10 Aug 2023 13:57  --------------------------------------------------------  IN: 1139 mL / OUT: 590 mL / NET: 549 mL        LABS:	 	                        7.9    11.91 )-----------( 50       ( 10 Aug 2023 11:22 )             24.4     08-10    136  |  109<H>  |  16  ----------------------------<  207<H>  4.0   |  18<L>  |  0.48<L>    Ca    7.2<L>      10 Aug 2023 04:14  Phos  2.8     08-10  Mg     1.7     08-10    TPro  4.1<L>  /  Alb  1.7<L>  /  TBili  2.3<H>  /  DBili  x   /  AST  42<H>  /  ALT  22  /  AlkPhos  73  08-09    CARDIAC MARKERS ( 08 Aug 2023 20:10 )  x     / x     / 52 U/L / x     / 1.0 ng/mL    TELEMETRY:  A-fib/flutter at 11am this morning, and than A-fib/flutter rhythm again at 1 pm, returned to NSR post amiodarone 	    ECG: previous episodes of A-fib/flutter resent on EKG readings from earlier dates July 17, July 28, and August 10th   HPI:  Osiel Norwood is a 67YO male with a past medical history of type 2 diabetes, hyperlipidemia, iron deficiency anemia, tracheal stenosis s/p tracheostomy (which was closed by ENT 7 days ago); glioblastoma s/p resection 1/27/2022, and Avastin treatment 3/2023 presenting with altered mental status and weakness x2 days. Per family, they last saw him last night around 6pm when he was at baseline. Patient has intermittent expressive aphasia but is getting worse and now is persistent, and has intermittent nausea and vomiting since last night. Patient was supposed to receive an outpatient brain MRI on 7/25/2023. Stroke code called in ED, Head CT shows new hyperdensity in frontal parietal - surgical site. MRI is ordered to rule out tumor recurrence. Per daughter at bedside, Entresto and Eliquis were stopped per his PC - cardiologist. Cultures are positive for Pseudomonas, unknown source of infection.   (07 Jul 2023 13:10)    Pt seen and examined at bedside. He remains sedated and intubated. ALFIE -2. Cardiology was consulted today for new onset A-fib/flutter with HR in the ranging from 120-140s. He converted to NSR after amiodarone 150 mg IVPB. He is s/p 3mg Metroprolol tartrate IV and 5 mg Metroprolol tartrate IV and Amiodarone 150 mg IVPB. He remains in NSR post amiodarone with a rate in the 80s. No other changes at this time.       ROS: not able to obtain as pt is intubated and sedated.     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 650 milliGRAM(s) Oral every 6 hours PRN  acetylcysteine 10%  Inhalation 4 milliLiter(s) Inhalation every 6 hours  albuterol/ipratropium for Nebulization 3 milliLiter(s) Nebulizer every 6 hours  aMIOdarone Infusion 1 mG/Min IV Continuous <Continuous>  atorvastatin 20 milliGRAM(s) Oral at bedtime  chlorhexidine 0.12% Liquid 15 milliLiter(s) Oral Mucosa every 12 hours  chlorhexidine 2% Cloths 1 Application(s) Topical <User Schedule>  chlorhexidine 4% Liquid 1 Application(s) Topical <User Schedule>  cisatracurium Infusion 3 MICROgram(s)/kG/Min IV Continuous <Continuous>  fentaNYL    Injectable 50 MICROGram(s) IV Push every 2 hours PRN  fentaNYL   Infusion. 0.5 MICROgram(s)/kG/Hr IV Continuous <Continuous>  fludroCORTISONE 0.2 milliGRAM(s) Oral daily  hydrocortisone sodium succinate Injectable 50 milliGRAM(s) IV Push every 6 hours  insulin lispro (ADMELOG) corrective regimen sliding scale   SubCutaneous every 6 hours  lacosamide IVPB 50 milliGRAM(s) IV Intermittent every 12 hours  lactobacillus acidophilus 1 Tablet(s) Oral daily  metoclopramide Injectable 10 milliGRAM(s) IV Push every 8 hours  metoprolol tartrate Injectable 5 milliGRAM(s) IV Push once  multivitamin 1 Tablet(s) Oral daily  norepinephrine Infusion 0.05 MICROgram(s)/kG/Min IV Continuous <Continuous>  pantoprazole  Injectable 40 milliGRAM(s) IV Push every 12 hours  petrolatum Ophthalmic Ointment 1 Application(s) Both EYES three times a day  phenylephrine    Infusion 0.2 MICROgram(s)/kG/Min IV Continuous <Continuous>  phytonadione  IVPB 10 milliGRAM(s) IV Intermittent daily  piperacillin/tazobactam IVPB.. 4.5 Gram(s) IV Intermittent every 8 hours  propofol Infusion 50 MICROgram(s)/kG/Min IV Continuous <Continuous>  simethicone 80 milliGRAM(s) Chew every 6 hours  sodium chloride 3 Gram(s) Oral every 6 hours  sodium chloride 0.9% lock flush 10 milliLiter(s) IV Push every 1 hour PRN  sodium chloride 0.9% lock flush 10 milliLiter(s) IV Push every 1 hour PRN  sodium chloride 3%  Inhalation 4 milliLiter(s) Inhalation every 6 hours  sodium chloride 3%. 500 milliLiter(s) IV Continuous <Continuous>  sucralfate suspension 1 Gram(s) Oral every 6 hours  vasopressin Infusion 0.04 Unit(s)/Min IV Continuous <Continuous>      T(C): 37.5 (08-10-23 @ 09:07), Max: 38.7 (08-09-23 @ 15:00)  HR: 113 (08-10-23 @ 13:00) (97 - 142)  BP: 118/70 (08-10-23 @ 11:05) (115/59 - 121/57)  RR: 22 (08-10-23 @ 13:00) (22 - 24)  SpO2: 99% (08-10-23 @ 13:00) (96% - 99%)    08-09-23 @ 07:01  -  08-10-23 @ 07:00  --------------------------------------------------------  IN: 4107.2 mL / OUT: 2290 mL / NET: 1817.2 mL    08-10-23 @ 07:01  -  08-10-23 @ 13:57  --------------------------------------------------------  IN: 1139 mL / OUT: 590 mL / NET: 549 mL        PHYSICAL EXAM:    Constitutional: resting intubated and sedated   Respiratory: CTA B/L; no W/R/R, no retractions  Cardiac: +S1/S2; RRR; no M/R/G; PMI non-displaced  Extremities: WWP, no clubbing or cyanosis; 2+ edema more prominent on the shins b/l than ankles       I&O's Summary    09 Aug 2023 07:01  -  10 Aug 2023 07:00  --------------------------------------------------------  IN: 4107.2 mL / OUT: 2290 mL / NET: 1817.2 mL    10 Aug 2023 07:01  -  10 Aug 2023 13:57  --------------------------------------------------------  IN: 1139 mL / OUT: 590 mL / NET: 549 mL        LABS:	 	                        7.9    11.91 )-----------( 50       ( 10 Aug 2023 11:22 )             24.4     08-10    136  |  109<H>  |  16  ----------------------------<  207<H>  4.0   |  18<L>  |  0.48<L>    Ca    7.2<L>      10 Aug 2023 04:14  Phos  2.8     08-10  Mg     1.7     08-10    TPro  4.1<L>  /  Alb  1.7<L>  /  TBili  2.3<H>  /  DBili  x   /  AST  42<H>  /  ALT  22  /  AlkPhos  73  08-09    CARDIAC MARKERS ( 08 Aug 2023 20:10 )  x     / x     / 52 U/L / x     / 1.0 ng/mL    TELEMETRY:  A-fib/flutter at 11am this morning, and than A-fib/flutter rhythm again at 1 pm, returned to NSR post amiodarone 	    ECG: previous episodes of A-fib/flutter resent on EKG readings from earlier dates July 17, July 28, and August 10th

## 2023-08-10 NOTE — CHART NOTE - NSCHARTNOTEFT_GEN_A_CORE
1 dose tobramycin given at 10pm. Remains on imbex, propofol and fentanyl drips. Serum sodium stable. Neuro exam unchanged. Stess dose steroids 2/2 sepsis hydrocort [id number]. Went into rapid a.fib, given 3 lopressor. Rapid a. fib again, given 5 lopressor, cards consulted. Given 1u platelets. Heme recs if no bleeding, plt threshold 10 for transfusion. Pressor switched from levo to [place] gtt. ID rec continue Zosyn, no need for Tobramycin based on sensitivities. family meeting w/ palliative- now DNR.

## 2023-08-10 NOTE — PROGRESS NOTE ADULT - PROBLEM SELECTOR PLAN 5
.  s/p resection 1/27/2022, last dose of Avastin treatment 3/2023. CTH 7/7: possible POD. MRI suspicious for recurrent tumor and hydro.   - pt currently is not a candidate for any DMTx iso critical illness  - Per team, further DMTx may be offered in future   - If no further disease modifying treatments offered or patient/family declined further disease modifying treatments,  patient would qualify for hospice
.  - Full code  - daughter Roxanne Norwood [phone]    In addition to the E/M visit, an advance care planning meeting was performed. Start time: 1200; End time: 1230; Total time: 30 min. A face to face meeting to discuss advance care planning was held today regarding:   VARUN Norwood  Primary decision maker:  Patient is unable to participate in decision making;  Alternate/surrogate:  Wood and Roxanne Cuco   . Discussed advance directives including, but not limited to, healthcare proxy and code status, as well as disease trajectory, patient's values/goals, and health care options that are available for end of life care. Decision regarding code status: FULL CODE; Documentation completed today:  [facility] note
See GO conversation above.  - DNR  - HCP: Roxanne Norwood (primary); Addie Norwood (secondary)    In addition to the E/M visit, an advance care planning meeting was performed. Start time: 3:15pm; End time: 4:01pm; Total time: 46min. A face to face meeting to discuss advance care planning was held today regarding: JORDAN NORWOOD. Primary decision maker: Patient is unable to participate in decision making; Alternate/surrogate: Roxanne Norwood. Discussed advance directives including, but not limited to, healthcare proxy and code status. Decision regarding code status: DNR; Documentation completed today: [facility] note

## 2023-08-10 NOTE — PROVIDER CONTACT NOTE (OTHER) - REASON
Pt belly breathing over ventilator
Pt temp 101.1 after Tylenol given
SBP outside of parameter.
pt decompensating
Pt UO dropped from 100cc to 20cc
Pt constantly trying to climb OOB and now pulled off EEG electrodes.
TOF 4/4 despite increased nimbex titration
trach sutures too tight
Febrile
Hypotensive
Follow up on Neuro assessment and U/o dark tea color observed.
Hypotensive after bolus
Pt had burst of PSVT , for 13beats.
pt could no tolerate PO meds
Right arm 20G out.
Microbiology called to say aerobic cultures came back positive.
Pt tube feeding residuals > 500
Vanc trough returned 5.6

## 2023-08-10 NOTE — PROGRESS NOTE ADULT - PROBLEM SELECTOR PLAN 1
.  pt acutely altered 7/17, hx of GBM, poor PO intake.  requires total assistance with all ADLs. pps 30%  - cw supportive care, repositioning, skin care  - mod protein ji malnutrition as below  - PT rec: CHARLES
Patient diagnosed in 1/2022, now in a trial but had progression of disease since starting trial.  - No further treatment
.  alb 7.7 on admission 7/7, now 2.2 on 8/1    Clinical evidence indicates that the patient has Severe protein calorie malnutrition/ 3rd degree  In context of     Acute Illness/Injury (>7days)    and Chronic Illness (>1 month)  Energy/Food intake <50% of estimated energy requirement >5 days  Weight loss: Moderate - severe   Body Fat loss: Severe   Cachexia, temporal wasting, muscle atrophy  Muscle mass loss: Severe  Risk for Skin failure/pressure ulcers   Strength: weakened severe  bedbound    Recommend:   - GO trial of NG tube feeds vs TPN
No

## 2023-08-10 NOTE — ADVANCED PRACTICE NURSE CONSULT - ASSESSMENT
Patient sedated, on ventilator, and pressors. Left ear evolving DTPI measuring 2 cm x 1 cm. Sacral evolving DTPI, unable to measure. Patient became hemodynamically unstable when turned to assess sacral pressure injury. Patient is being turned and repositioned when hemodynamically stable. Bilateral heels being offloaded with heel protectors.   JUMA Whipple present and assisted during assessment.

## 2023-08-10 NOTE — PROGRESS NOTE ADULT - SUBJECTIVE AND OBJECTIVE BOX
NSCU ATTENDING -- ADDITIONAL PROGRESS NOTE    Nighttime rounds were performed     69 y/o male with PMH of type 2 diabetes, hyperlipidemia, iron deficiency anemia, tracheal stenosis s/p tracheostomy, glioblastoma s/p resection 1/27/2022, and Avastin treatment 3/2023 presenting with altered mental status and weakness x2 days. Per family, LKN 6pm on 07/05 when he was at baseline. Patient has intermittent expressive aphasia but is getting worse and now is persistent, and has intermittent nausea and vomiting since 07/05. Patient was scheduled to have outpatient brain MRI on 7/25/2023.  Stroke code called in ED. Head CT shows new hyperdensity in frontal parietal - surgical site. MRI ordered to rule out tumor recurrence.   Per daughter at bedside, Entresto and Eliquis were stopped per his PC - cardiologist.  (07 Jul 2023 13:10)      ICU Vital Signs Last 24 Hrs  T(C): 35 (10 Aug 2023 18:00), Max: 37.8 (10 Aug 2023 16:00)  T(F): 95 (10 Aug 2023 18:00), Max: 100 (10 Aug 2023 16:00)  HR: 72 (10 Aug 2023 19:00) (67 - 142)  BP: 118/70 (10 Aug 2023 11:05) (118/70 - 121/57)  BP(mean): 87 (10 Aug 2023 11:05) (82 - 87)  ABP: 108/51 (10 Aug 2023 19:00) (94/46 - 132/53)  ABP(mean): 74 (10 Aug 2023 19:00) (66 - 85)  RR: 18 (10 Aug 2023 19:00) (18 - 22)  SpO2: 100% (10 Aug 2023 19:00) (96% - 100%)      08-09-23 @ 07:01  -  08-10-23 @ 07:00  --------------------------------------------------------  IN: 4107.2 mL / OUT: 2290 mL / NET: 1817.2 mL    08-10-23 @ 07:01  -  08-10-23 @ 19:43  --------------------------------------------------------  IN: 2511.8 mL / OUT: 1080 mL / NET: 1431.8 mL      Mode: AC/ CMV (Assist Control/ Continuous Mandatory Ventilation), RR (machine): 22, TV (machine): 410, FiO2: 40, PEEP: 8, ITime: 1, MAP: 12, PIP: 20      PHYSICAL EXAM:  General: Cachectic in appearance. Temporal wasting  Intubated, sedated and paralysed  HEENT: Tracheostomy site C/D/I, NGT  Neurological: Pupils 2mm and reactive  Pulmonary: Diminished throughout  Cardiovascular: S1, S2, RRR, tachycardic  Gastrointestinal: Soft, nontender, nondistended   : Campuzano catheter in place  Extremities: Upper extremity edema noted  Skin: Petechial rash diffusely over trunk and groin      MEDICATIONS:   acetaminophen     Tablet .. 650 milliGRAM(s) Oral every 6 hours PRN  acetylcysteine 10%  Inhalation 4 milliLiter(s) Inhalation every 6 hours  albuterol/ipratropium for Nebulization 3 milliLiter(s) Nebulizer every 6 hours  aMIOdarone Infusion 1 mG/Min (33.3 mL/Hr) IV Continuous <Continuous>  aMIOdarone Infusion 0.5 mG/Min (16.7 mL/Hr) IV Continuous <Continuous>  atorvastatin 20 milliGRAM(s) Oral at bedtime  chlorhexidine 0.12% Liquid 15 milliLiter(s) Oral Mucosa every 12 hours  chlorhexidine 2% Cloths 1 Application(s) Topical <User Schedule>  chlorhexidine 4% Liquid 1 Application(s) Topical <User Schedule>  cisatracurium Infusion 3 MICROgram(s)/kG/Min (13.1 mL/Hr) IV Continuous <Continuous>  fentaNYL    Injectable 50 MICROGram(s) IV Push every 2 hours PRN  fentaNYL   Infusion. 0.5 MICROgram(s)/kG/Hr (3.63 mL/Hr) IV Continuous <Continuous>  fludroCORTISONE 0.2 milliGRAM(s) Oral daily  hydrocortisone sodium succinate Injectable 50 milliGRAM(s) IV Push every 6 hours  insulin lispro (ADMELOG) corrective regimen sliding scale   SubCutaneous every 6 hours  lacosamide IVPB 50 milliGRAM(s) IV Intermittent every 12 hours  lactobacillus acidophilus 1 Tablet(s) Oral daily  metoclopramide Injectable 10 milliGRAM(s) IV Push every 8 hours  metoprolol tartrate Injectable 5 milliGRAM(s) IV Push once  multivitamin 1 Tablet(s) Oral daily  norepinephrine Infusion 0.05 MICROgram(s)/kG/Min (3.4 mL/Hr) IV Continuous <Continuous>  pantoprazole  Injectable 40 milliGRAM(s) IV Push every 12 hours  petrolatum Ophthalmic Ointment 1 Application(s) Both EYES three times a day  phenylephrine    Infusion 0.2 MICROgram(s)/kG/Min (2.72 mL/Hr) IV Continuous <Continuous>  phytonadione  IVPB 10 milliGRAM(s) IV Intermittent daily  piperacillin/tazobactam IVPB.. 4.5 Gram(s) IV Intermittent every 8 hours  propofol Infusion 50 MICROgram(s)/kG/Min (21.8 mL/Hr) IV Continuous <Continuous>  simethicone 80 milliGRAM(s) Chew every 6 hours  sodium chloride 3 Gram(s) Oral every 6 hours  sodium chloride 0.9% lock flush 10 milliLiter(s) IV Push every 1 hour PRN  sodium chloride 0.9% lock flush 10 milliLiter(s) IV Push every 1 hour PRN  sodium chloride 3%  Inhalation 4 milliLiter(s) Inhalation every 6 hours  sodium chloride 3%. 500 milliLiter(s) (30 mL/Hr) IV Continuous <Continuous>  sucralfate suspension 1 Gram(s) Oral every 6 hours  vasopressin Infusion 0.04 Unit(s)/Min (6 mL/Hr) IV Continuous <Continuous>      LABS                     7.2    9.99  )-----------( 30       ( 10 Aug 2023 17:04 )             22.3     08-10    136  |  109<H>  |  16  ----------------------------<  207<H>  4.0   |  18<L>  |  0.48<L>    Ca    7.2<L>      10 Aug 2023 04:14  Phos  2.8     08-10  Mg     1.7     08-10    TPro  4.1<L>  /  Alb  1.7<L>  /  TBili  2.3<H>  /  DBili  x   /  AST  42<H>  /  ALT  22  /  AlkPhos  73  08-09    LIVER FUNCTIONS - ( 09 Aug 2023 13:33 )  Alb: 1.7 g/dL / Pro: 4.1 g/dL / ALK PHOS: 73 U/L / ALT: 22 U/L / AST: 42 U/L / GGT: x           ABG - ( 10 Aug 2023 04:24 )  pH, Arterial: 7.29  pH, Blood: x     /  pCO2: 39    /  pO2: 102   / HCO3: 19    / Base Excess: -7.3  /  SaO2: 99.1        ASSESSMENT:   69 y/o M with septic shock secondary to Klebsiella bacteremia   Recurrent shock- likely septic secondary to PNA ?aspiration  Hypoxemic respiratory failure  ARDS  Bacteremia  GI bleed  GBM s/p resection, recent chemotherapy  History of Takotsubo cardiomyopathy  Chronic hyponatremia secondary to SIADH  Type 2 DM      PLAN  NEURO:  Pupil checks Q1  Analgesia: Fentanyl gtt  Sedation: Propofol RASS neg 5  Paralytic: Nimbex train of four 2/4 / vent synchrony  CT head 8/6: Stable. No significant heme  Seizure history: Continue vimpat. VEEG negative for seizures. DCed.  Cerebral edema: On dexamethasone 2mg Q12  Activity: Bedrest.   Palliative care following    PULMONARY: Concern for ARDS likely secondary to aspiration  Lung protective ventilation. Monitor peak/plateau  CXR, ABG  VAP bundle. Pulmonary toilet  POCUS: B lines R>L. IVC difficult to visualize. B/L effusions.     CARDIOVASCULAR: Hypotension in setting of likely septic shock (recurrent); bacteremia.   MAP goal 65-70  Pressors: Levophed, phenylephrine and vasopressin  TTE: EF 60-65%. No WMA, no vegetation  Lactate: 2.5-> 1.9    GASTROENTEROLOGY: Esophagitis and non-bleeding ulcer, r/o ileus.  GI ppx: PPI bid. Continue sucralfate  Will need PEG eventually; gen surg  AXR 8/5- no significant ileus. Consider CT abd/pelvis once stable  On reglan. LBM: 8/7  Keep NPO for now. NGT to LIWS  GI following    RENAL/: Chronic hyponatremia secondary to SIADH, urine retention. Difficult catheterisation  Metabolic acidosis  Hyponatremia- improving. Hypertonics: 3% at 30cc/hr  Campuzano catheter placed by Urology. Maintain per Urology. Monitor Is/Os  Na goal 135-145  Urology/nephrology following    ENDOCRINE: Diabetes mellitus  A1c- 5.7  ISS while NPO.Monitor fingersticks  AM cortisol:    HEME/ONC: Pancytopenia likely chemo-induced vs sepsis, bone marrow suppression, R/O hemolysis, coagulopathy, anemia  DIC ruled out.   Trend H/H and plt. Hb goal >7.0, plt goal >50. Hb 8.7-> 7.2  Plt antibody negative  DVT ppx: Will hold chemical DVT ppx in setting of low platelets/ bleed  B/L SCDs  Heme following    INFECTIOUS: S/P Klebsiella bacteremia. Now with enterococcus faecalis, pseudomonas and clostridium perfringens bacteremia  On Zosyn.   ID following  Repeat blood cultures 8/9;   CT abd/pelvis if stable.     MISC:  Family meeting 8/10. DNR/DNI  [] awaiting [x] updated daughter and son-in- law at bedside [] family meeting      Continue care per daytime intensivist Bello AMIN  Additional critical care time: 60 minutes                                   NSCU ATTENDING -- ADDITIONAL PROGRESS NOTE    Nighttime rounds were performed     69 y/o male with PMH of type 2 diabetes, hyperlipidemia, iron deficiency anemia, tracheal stenosis s/p tracheostomy, glioblastoma s/p resection 1/27/2022, and Avastin treatment 3/2023 presenting with altered mental status and weakness x2 days. Per family, LKN 6pm on 07/05 when he was at baseline. Patient has intermittent expressive aphasia but is getting worse and now is persistent, and has intermittent nausea and vomiting since 07/05. Patient was scheduled to have outpatient brain MRI on 7/25/2023.  Stroke code called in ED. Head CT shows new hyperdensity in frontal parietal - surgical site. MRI ordered to rule out tumor recurrence.   Per daughter at bedside, Entresto and Eliquis were stopped per his PC - cardiologist.  (07 Jul 2023 13:10)      ICU Vital Signs Last 24 Hrs  T(C): 35 (10 Aug 2023 18:00), Max: 37.8 (10 Aug 2023 16:00)  T(F): 95 (10 Aug 2023 18:00), Max: 100 (10 Aug 2023 16:00)  HR: 72 (10 Aug 2023 19:00) (67 - 142)  BP: 118/70 (10 Aug 2023 11:05) (118/70 - 121/57)  BP(mean): 87 (10 Aug 2023 11:05) (82 - 87)  ABP: 108/51 (10 Aug 2023 19:00) (94/46 - 132/53)  ABP(mean): 74 (10 Aug 2023 19:00) (66 - 85)  RR: 18 (10 Aug 2023 19:00) (18 - 22)  SpO2: 100% (10 Aug 2023 19:00) (96% - 100%)      08-09-23 @ 07:01  -  08-10-23 @ 07:00  --------------------------------------------------------  IN: 4107.2 mL / OUT: 2290 mL / NET: 1817.2 mL    08-10-23 @ 07:01  -  08-10-23 @ 19:43  --------------------------------------------------------  IN: 2511.8 mL / OUT: 1080 mL / NET: 1431.8 mL      Mode: AC/ CMV (Assist Control/ Continuous Mandatory Ventilation), RR (machine): 22, TV (machine): 410, FiO2: 40, PEEP: 8, ITime: 1, MAP: 12, PIP: 20      PHYSICAL EXAM:  General: Cachectic in appearance. Temporal wasting  Intubated, sedated and paralysed  HEENT: Tracheostomy site C/D/I, NGT, MM dry  Neurological: Pupils 3mm and reactive with NPI 3  Pulmonary: Diminished throughout  Cardiovascular: S1, S2, RRR  Gastrointestinal: Soft, nontender,distended   : Campuzano catheter in place  Extremities: Upper extremity edema noted  Skin: Petechial rash diffusely over trunk and groin    MEDICATIONS:   acetaminophen     Tablet .. 650 milliGRAM(s) Oral every 6 hours PRN  acetylcysteine 10%  Inhalation 4 milliLiter(s) Inhalation every 6 hours  albuterol/ipratropium for Nebulization 3 milliLiter(s) Nebulizer every 6 hours  aMIOdarone Infusion 1 mG/Min (33.3 mL/Hr) IV Continuous <Continuous>  aMIOdarone Infusion 0.5 mG/Min (16.7 mL/Hr) IV Continuous <Continuous>  atorvastatin 20 milliGRAM(s) Oral at bedtime  chlorhexidine 0.12% Liquid 15 milliLiter(s) Oral Mucosa every 12 hours  chlorhexidine 2% Cloths 1 Application(s) Topical <User Schedule>  chlorhexidine 4% Liquid 1 Application(s) Topical <User Schedule>  cisatracurium Infusion 3 MICROgram(s)/kG/Min (13.1 mL/Hr) IV Continuous <Continuous>  fentaNYL    Injectable 50 MICROGram(s) IV Push every 2 hours PRN  fentaNYL   Infusion. 0.5 MICROgram(s)/kG/Hr (3.63 mL/Hr) IV Continuous <Continuous>  fludroCORTISONE 0.2 milliGRAM(s) Oral daily  hydrocortisone sodium succinate Injectable 50 milliGRAM(s) IV Push every 6 hours  insulin lispro (ADMELOG) corrective regimen sliding scale   SubCutaneous every 6 hours  lacosamide IVPB 50 milliGRAM(s) IV Intermittent every 12 hours  lactobacillus acidophilus 1 Tablet(s) Oral daily  metoclopramide Injectable 10 milliGRAM(s) IV Push every 8 hours  metoprolol tartrate Injectable 5 milliGRAM(s) IV Push once  multivitamin 1 Tablet(s) Oral daily  norepinephrine Infusion 0.05 MICROgram(s)/kG/Min (3.4 mL/Hr) IV Continuous <Continuous>  pantoprazole  Injectable 40 milliGRAM(s) IV Push every 12 hours  petrolatum Ophthalmic Ointment 1 Application(s) Both EYES three times a day  phenylephrine    Infusion 0.2 MICROgram(s)/kG/Min (2.72 mL/Hr) IV Continuous <Continuous>  phytonadione  IVPB 10 milliGRAM(s) IV Intermittent daily  piperacillin/tazobactam IVPB.. 4.5 Gram(s) IV Intermittent every 8 hours  propofol Infusion 50 MICROgram(s)/kG/Min (21.8 mL/Hr) IV Continuous <Continuous>  simethicone 80 milliGRAM(s) Chew every 6 hours  sodium chloride 3 Gram(s) Oral every 6 hours  sodium chloride 0.9% lock flush 10 milliLiter(s) IV Push every 1 hour PRN  sodium chloride 0.9% lock flush 10 milliLiter(s) IV Push every 1 hour PRN  sodium chloride 3%  Inhalation 4 milliLiter(s) Inhalation every 6 hours  sodium chloride 3%. 500 milliLiter(s) (30 mL/Hr) IV Continuous <Continuous>  sucralfate suspension 1 Gram(s) Oral every 6 hours  vasopressin Infusion 0.04 Unit(s)/Min (6 mL/Hr) IV Continuous <Continuous>      LABS                     7.2    9.99  )-----------( 30       ( 10 Aug 2023 17:04 )             22.3     08-10    136  |  109<H>  |  16  ----------------------------<  207<H>  4.0   |  18<L>  |  0.48<L>    Ca    7.2<L>      10 Aug 2023 04:14  Phos  2.8     08-10  Mg     1.7     08-10    TPro  4.1<L>  /  Alb  1.7<L>  /  TBili  2.3<H>  /  DBili  x   /  AST  42<H>  /  ALT  22  /  AlkPhos  73  08-09    LIVER FUNCTIONS - ( 09 Aug 2023 13:33 )  Alb: 1.7 g/dL / Pro: 4.1 g/dL / ALK PHOS: 73 U/L / ALT: 22 U/L / AST: 42 U/L / GGT: x           ABG - ( 10 Aug 2023 04:24 )  pH, Arterial: 7.29  pH, Blood: x     /  pCO2: 39    /  pO2: 102   / HCO3: 19    / Base Excess: -7.3  /  SaO2: 99.1        ASSESSMENT:   69 y/o M with septic shock secondary to Klebsiella bacteremia   Recurrent shock- likely septic secondary to PNA ?aspiration  Hypoxemic respiratory failure  ARDS  Bacteremia  GI bleed  GBM s/p resection, recent chemotherapy  History of Takotsubo cardiomyopathy  Chronic hyponatremia secondary to SIADH  Type 2 DM      PLAN  NEURO:  Pupil checks Q1  Analgesia: Fentanyl gtt  Sedation: Propofol RASS neg 5  Paralytic: Nimbex train of four 2/4 / vent synchrony  Seizure history: Continue vimpat. VEEG negative for seizures. DCed.  Cerebral edema: On dexamethasone 2mg Q12  Activity: Bedrest.   Palliative care following    PULMONARY: ARDS likely secondary to aspiration - improving  Lung protective ventilation. Monitor peak/plateau pressures  CXR, ABG  VAP bundle. Pulmonary toilet  POCUS: B lines R>L. IVC difficult to visualize. B/L effusions.     CARDIOVASCULAR: Hypotension in setting of likely septic shock (recurrent); bacteremia, Afib RVR  MAP goal 65-70  Pressors: Levophed, phenylephrine and vasopressin  TTE: EF 60-65%. No WMA, no vegetation  Lactate: 2.5  Amiodarone DCed per cardiology as converted to NSR    GASTROENTEROLOGY: Esophagitis and non-bleeding ulcer, r/o ileus.  GI ppx: PPI bid. Continue sucralfate  Will need PEG eventually; gen surg  AXR 8/5- no significant ileus. Consider CT abd/pelvis once stable  On reglan. LBM: 8/7  Keep NPO for now. NGT to LIWS  GI following    RENAL/: Chronic hyponatremia secondary to SIADH, urine retention. Difficult catheterisation  Metabolic acidosis  Hyponatremia- improving. Hypertonics: 3% at 30cc/hr  Campuzano catheter placed by Urology. Maintain per Urology. Monitor Is/Os  Na goal 135-145  Urology/nephrology following    ENDOCRINE: Diabetes mellitus  A1c- 5.7  ISS while NPO. Monitor fingersticks    HEME/ONC: Pancytopenia likely chemo-induced vs sepsis, bone marrow suppression, R/O hemolysis, coagulopathy, anemia  DIC ruled out.   Trend H/H and plt. Hb goal >7.0, plt goal >10.   Hb 8.7-> 7.2  DVT ppx: Will hold chemical DVT ppx in setting of low platelets/ bleed  B/L SCDs  Heme following    INFECTIOUS: S/P Klebsiella bacteremia. Now with enterococcus faecalis, pseudomonas and clostridium perfringens bacteremia  On Zosyn  ID following. No repeat cultures indicated per ID    MISC:  Family meeting 8/10. DNR/DNI  [] awaiting [x] updated daughter and son-in- law at bedside [] family meeting    Continue care per daytime intensivist Bello AMIN    Additional critical care time: 60 minutes

## 2023-08-10 NOTE — PROGRESS NOTE ADULT - PROBLEM SELECTOR PLAN 4
.  secondary to klebsiella bacteremia. intubated, off pressors. febrile o/n  - ID recs noted: suspect due to gut translocation in setting of GIB  - EGD today
.  s/p resection 1/27/2022, last dose of Avastin treatment 3/2023. CTH 7/7: Interval development of a 13 mm hyperdense nodule along the inferior margin of the resection cavity which may represent progression  of disease with hemorrhage. MRI suspicious for recurrent tumor and hydro.   - functional status/mentation improving so GOC remain aggressive, family wants to discuss DMTx that may be offered   - If no further disease modifying treatments offered or patient/family declined further disease modifying treatments,  patient would qualify for hospice.
PPSV 10%.  - Supportive care

## 2023-08-10 NOTE — PROGRESS NOTE ADULT - PROBLEM SELECTOR PLAN 3
.  AMS and mentation fluctuating throughout hospital course, hx gbm, hyper/hypoactive mixed delirium mentation further cb infection, anemia   - CT Brain Stroke 07.17.23 sequela of chronic microvascular ischemic disease,  underlying brain parenchymal volume loss. MR Head w/wo IV Cont 07.09.23 Since 5/25/25 there is progressive enhancing disease in the left inferior frontal lobe, with nodular and solid site highly suspicious for recurrent tumor    - Delirium precautions: bedside window with blinds open; frequent re-orientation; pictures of family if possible; minimize lines, physical restraints, nighttime awakenings as medically feasible; ensure bowel movements daily or every other day, monitor for urinary retention; avoid anticholinergic medications or benzodiazepines as clinically feasible.  - medical management per primary team, consultants ID, GI
.  2/2  fever/SIRS, metabolic encephalopathy or possible drug reaction. abx held, dt suspected drug reaction. CTAP wo infectious source.  mentation improving, though remains intermittently agitated.   - Neuro cs: no seizure activity on eeg  - Nephr recs for hyponatremia noted   - ID: blood cultures likely contaminant, f/u surveillance cultures, if any fever or repeat positive blood culture start Vanc and reconsult ID  - Delirium precautions: bedside window with blinds open; frequent re-orientation; pictures of family if possible; minimize lines, physical restraints, nighttime awakenings as medically feasible; ensure bowel movements daily or every other day, monitor for urinary retention; avoid anticholinergic medications or benzodiazepines as clinically feasible.  - For acute anxiety not responsive to verbal redirection, can use zyprexa 2.5mg PO BID PRN for acute anxiety/panic, and in anticipation of invasive procedures as needed. Please order EKG to check Qtc prior to first administration.  -Monitor for possible qTC prolongation daily. If >500ms, recommend discontinuing Zyprexa  - Do not co-administer IM zyprexa with IM/IV benzos within 4 hours of each other, due to risk of autonomic suppression
Platelets 18 overnight.  - Transfusion per primary team

## 2023-08-10 NOTE — PROGRESS NOTE ADULT - ASSESSMENT
67 yo M with GBM, severe hyponatremia, DM, HLD, iron deficiency anemia, BPH admitted with encephalopathy, found to have POD. Hospital course complicated by sepsis secondary to bacteremia, decompensated, intubated, started on antibiotics and upgrated to ICU on 7/29. S/p trach with ENT (8/4/23). Now with recurrent septic shock and bacteremia, hypoxic respiratory failure, ARDS, GIB. Palliative consulted for complex medical decision making and support.

## 2023-08-10 NOTE — PROGRESS NOTE ADULT - ASSESSMENT
Assessment and Plan:  IMPRESSION: JORDAN CHAKRABORTY  is a 68y Male with hx GBM s/p resection 2022, admitted for AMS and suspected recurrence of GBM, hospital stay complicated by bacteremia and septic shock requiring prolonged intubation. Now s/p trach on 8/4 (6 cuffed Shiley, cuff inflated).    Plan:  - Routine trach care  - Continue plan as per primary team   - Contact ENT to have patient changed to cuffless when off the vent and does not need any additional procedures   Page ENT at [phone] with any questions/concerns.    Herminia Kapoor PA-C  08-10-23 @ 09:58

## 2023-08-10 NOTE — PROGRESS NOTE ADULT - PROBLEM SELECTOR PLAN 2
.  pps 20%, intubated in ICU, unable to follow commands, total assist with ADLs  - cw supportive care, skin care, repositioning  - PT when appropriate  - prior to 7/29, pt had been pending transfer to Acute Rehab at [facility]; at this time there are no changes in family's wishes
Clinical evidence indicates that the patient has Moderate protein calorie malnutrition/ 2nd degree  In context of Chronic Illness (>1 month)  Energy/Food intake <75% of estimated energy requirement >7 days  Weight loss: Mild  (lbs lost recently)  Body Fat loss: Mild    Muscle mass loss: Mild   Fluid Accumulation: Mild    Strength: weakened Mild     Recommend:   - Encourage PO intake, uses medical cannabis for appetite at home   - c/w soft diet, aspiration precautions, keep head of bed at least 45 degrees during feeding
Due to bacteremia with pseudomonas and 2 other bacteria. Shock worsening  - On 2 pressors. Pressor requirements increasing  - Abx per ID

## 2023-08-10 NOTE — PROGRESS NOTE ADULT - NS ATTEST RISK PROBLEM GEN_ALL_CORE FT
Hyponatremia  positive blood culutres  GBM
Hyponatremia  Hyperglycemia/DM  GBM
Hyponatremia  Positive blood cultures  GBM
Acute Illness That Poses A Threat To Life  Decision Made To Not Resuscitate (DNR)
Acute Illness That Poses A Threat To Life

## 2023-08-10 NOTE — CONSULT NOTE ADULT - CONSULT REQUESTED BY NAME
Steven
Dr. Dorado
NSGY
Neurology
NSICU
Wood Dorado
house staff
neurosurgery
[facility]ukas[facility]
NSICU
Neurosurgery
Primary Team
Dr. Dorado

## 2023-08-11 NOTE — PROGRESS NOTE ADULT - ASSESSMENT
68y/M with  severe hyponatremia  GBM brain compression cerebral edema   Diabetes Mellitus Hypertension dyslipidemia  iron deficiency anemia  nephrolithiasis  thrombocytopenia  BPH    PLAN:   NEURO: comachecks q4h, VS q1h, fentanyl and propfol drip paralyzed, RASS goal -3 to -5  GBM - no neurosurgical plans, palliative care, continue steroids  defer MRI   REHAB:  physical therapy evaluation and management    EARLY MOB:   HOB up bed rest    PULM:  continue full vent support, cont nebs, FiO2 to 40%, PEEP to 5, ABG after 1 hour  CARDIO:  MAP 65-70, taper levo double strength, vasopressin  ENDO:  Blood sugar goals 140-180 mg/dL, continue insulin sliding scale,   GI:  GI Bleed - cont BID PPI, sucralfate  DIET: hold tube feeds for now  RENAL: Na goal 135-145, repeat BMP this afternoon, 3% @30  HEM/ONC: increase Vit K to 10 OD x 3 days; f/u repeat CBC; platelet transfusion x1, goal 50  VTE Prophylaxis: SCDs, no DVT chemoprophylaxis for now as patient is high risk for bleed (thrombocytopenia requiring platelet transfusion)  ID: severe septic shock pulmo (pseudomonas, enterococcus and acinetobacter) cont piperacillin/tazobactam, vanc trough, given 1 dose of tobramycin; ID on board, blood CS until culture NEG; f/u sensitivities of 3 bacteria  Social: C discussion with family, son and daughter, son-in-law    Active issues:  What's keeping patient in the ICU? [facility] vent  What is this patient's dispo plan?     ATTENDING ATTESTATION:  I was physically present for the key portions of the evaluation and management (E/M) service provided.  I agree with the above history, physical and plan, which I have reviewed and edited where appropriate.    Patient at high risk for neurological deterioration or death due to:  ICU delirium, aspiration PNA, DVT / PE.  Critical care time:  I have personally provided 60 minutes of critical care time, excluding time spent on separate procedures.      Plan discussed with RN, house staff. 68y/M with  severe hyponatremia  GBM brain compression cerebral edema   Diabetes Mellitus Hypertension dyslipidemia  iron deficiency anemia  nephrolithiasis  thrombocytopenia  BPH    PLAN:   NEURO: comachecks q4h, VS q1h, fentanyl and propfol drip paralyzed, RASS goal -3 to -5  GBM - no neurosurgical plans, palliative care, continue steroids  REHAB:  physical therapy evaluation and management    EARLY MOB:   HOB up bed rest    PULM:  continue full vent support, cont nebs, FiO2 to 40%, PEEP to 5, ABG   CARDIO:  MAP 65-70, taper levo [place], continue vasopressin   ENDO:  Blood sugar goals 140-180 mg/dL, continue insulin sliding scale  GI:  GI Bleed - cont BID PPI, sucralfate  DIET: start TPN   RENAL: Na goal 135-145, repeat BMP this afternoon, 3% @30, lasix 20mg IV x1  HEM/ONC: d/c Vit K, transfuse pRBC if < 7  VTE Prophylaxis: SCDs, no DVT chemoprophylaxis for now as patient is high risk for bleed (thrombocytopenia requiring platelet transfusion)  ID: severe septic shock pulmo (pseudomonas, enterococcus and acinetobacter) cont piperacillin/tazobactam, vanc trough, given 1 dose of tobramycin; ID on board, blood CS until culture NEG; f/u sensitivities of 3 bacteria  Social:     Active issues:  What's keeping patient in the ICU? [facility] vent  What is this patient's dispo plan?     ATTENDING ATTESTATION:  I was physically present for the key portions of the evaluation and management (E/M) service provided.  I agree with the above history, physical and plan, which I have reviewed and edited where appropriate.    Patient at high risk for neurological deterioration or death due to:  ICU delirium, aspiration PNA, DVT / PE.  Critical care time:  I have personally provided 60 minutes of critical care time, excluding time spent on separate procedures.      Plan discussed with RN, house staff.

## 2023-08-11 NOTE — PROGRESS NOTE ADULT - SUBJECTIVE AND OBJECTIVE BOX
Cardiology Consult    O/N:  Interval History/HPI: Pt seen and examined at bedside. He remains intubated and sedated. Pt remains in NSR, with intermittent A-fib/flutter.   Telemetry: Patient in NSR with intermittent A-fib/flutter     OBJECTIVE  T(C): 37.4 (08-11-23 @ 14:00), Max: 37.8 (08-11-23 @ 09:09)  HR: 103 (08-11-23 @ 16:59) (67 - 110)  BP: 101/60 (08-10-23 @ 21:00) (101/60 - 101/60)  RR: 13 (08-11-23 @ 16:00) (12 - 23)  SpO2: 99% (08-11-23 @ 16:59) (92% - 100%)    08-10-23 @ 07:01  -  08-11-23 @ 07:00  --------------------------------------------------------  IN: 5132 mL / OUT: 2310 mL / NET: 2822 mL    08-11-23 @ 07:01  -  08-11-23 @ 17:37  --------------------------------------------------------  IN: 1501.4 mL / OUT: 2750 mL / NET: -1248.6 mL        PHYSICAL EXAM:    Constitutional: resting sedated and intubated   Neck: no JVD   Respiratory: CTA B/L; no W/R/R, no retractions  Cardiac: +S1/S2; RRR; no M/R/G; PMI non-displaced  Extremities: WWP, no clubbing or cyanosis; no peripheral edema    LABS:                        7.3    11.07 )-----------( 17       ( 11 Aug 2023 02:22 )             22.5     08-11    142  |  110<H>  |  16  ----------------------------<  156<H>  2.6<LL>   |  23  |  0.45<L>    Ca    7.7<L>      11 Aug 2023 12:19  Phos  2.1     08-11  Mg     1.4     08-11      PT/INR - ( 11 Aug 2023 02:22 )   PT: 13.5 sec;   INR: 1.19          PTT - ( 10 Aug 2023 04:14 )  PTT:41.0 sec  Urinalysis Basic - ( 11 Aug 2023 12:19 )    Color: x / Appearance: x / SG: x / pH: x  Gluc: 156 mg/dL / Ketone: x  / Bili: x / Urobili: x   Blood: x / Protein: x / Nitrite: x   Leuk Esterase: x / RBC: x / WBC x   Sq Epi: x / Non Sq Epi: x / Bacteria: x        RADIOLOGY & ADDITIONAL TESTS:  Reviewed .    MEDICATIONS  (STANDING):  acetylcysteine 10%  Inhalation 4 milliLiter(s) Inhalation every 6 hours  albuterol/ipratropium for Nebulization 3 milliLiter(s) Nebulizer every 6 hours  atorvastatin 20 milliGRAM(s) Oral at bedtime  chlorhexidine 2% Cloths 1 Application(s) Topical <User Schedule>  chlorhexidine 4% Liquid 1 Application(s) Topical <User Schedule>  cisatracurium Infusion 3 MICROgram(s)/kG/Min (13.1 mL/Hr) IV Continuous <Continuous>  fentaNYL   Infusion. 0.5 MICROgram(s)/kG/Hr (3.63 mL/Hr) IV Continuous <Continuous>  hydrocortisone sodium succinate Injectable 25 milliGRAM(s) IV Push every 8 hours  insulin lispro (ADMELOG) corrective regimen sliding scale   SubCutaneous every 6 hours  lacosamide IVPB 50 milliGRAM(s) IV Intermittent every 12 hours  lactobacillus acidophilus 1 Tablet(s) Oral daily  metoclopramide Injectable 10 milliGRAM(s) IV Push every 8 hours  metoprolol tartrate Injectable 5 milliGRAM(s) IV Push once  multivitamin 1 Tablet(s) Oral daily  norepinephrine Infusion 0.05 MICROgram(s)/kG/Min (3.4 mL/Hr) IV Continuous <Continuous>  pantoprazole  Injectable 40 milliGRAM(s) IV Push every 12 hours  petrolatum Ophthalmic Ointment 1 Application(s) Both EYES three times a day  phenylephrine    Infusion 0.2 MICROgram(s)/kG/Min (2.72 mL/Hr) IV Continuous <Continuous>  piperacillin/tazobactam IVPB.. 4.5 Gram(s) IV Intermittent every 8 hours  propofol Infusion 50 MICROgram(s)/kG/Min (21.8 mL/Hr) IV Continuous <Continuous>  simethicone 80 milliGRAM(s) Chew every 6 hours  sodium chloride 3 Gram(s) Oral every 6 hours  sodium chloride 3%  Inhalation 4 milliLiter(s) Inhalation every 6 hours  sucralfate suspension 1 Gram(s) Oral every 6 hours  vasopressin Infusion 0.04 Unit(s)/Min (6 mL/Hr) IV Continuous <Continuous>    MEDICATIONS  (PRN):  acetaminophen     Tablet .. 650 milliGRAM(s) Oral every 6 hours PRN Temp greater or equal to 38C (100.4F), Mild Pain (1 - 3)  fentaNYL    Injectable 50 MICROGram(s) IV Push every 2 hours PRN agitation/tachycardia/vent desynchrony  sodium chloride 0.9% lock flush 10 milliLiter(s) IV Push every 1 hour PRN Pre/post blood products, medications, blood draw, and to maintain line patency  sodium chloride 0.9% lock flush 10 milliLiter(s) IV Push every 1 hour PRN Pre/post blood products, medications, blood draw, and to maintain line patency

## 2023-08-11 NOTE — PROGRESS NOTE ADULT - ASSESSMENT
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  He was medically managed.  Course c/b septic shock due to K.pneumo bacteremia s/p treatment with CTX and Flagyl. Patient developed septic shock with ARDS likely 2/2 aspiration PNA found to have PsA, E. faecalis, and Clostridium perfringens. Surveillance bcx NGTD. Spoke with micro lab today- PsA is S to Zosyn.  Drop in platelets likely DIC i/s/o sepsis- not likely zosyn induced thrombocytopenia     Suggest:  -f/u surveillance blood cultures. No need to send more surveillance cultures   -Continue Zosyn 4.5 g IV q8h via EI   -f/u GOC     Team 2 will follow you  Case d/w primary team.  Final recommendation pending attending note.    Kelsey Jolley, Infectious Diseases PA  Please reach out for any questions 9 am-5pm. For evenings and weekends, please call the ID physician on call.  Work cell: [phone] no cough, and no shortness of breath.

## 2023-08-11 NOTE — PROGRESS NOTE ADULT - NS ATTEND AMEND GEN_ALL_CORE FT
Informed by RN of critical value as follow:    Culture - Blood (09.22.18 @ 01:09)    Gram Stain:   Growth in aerobic bottle: Gram Positive Rods    Specimen Source: .Blood Blood    Culture Results:   Growth in aerobic bottle: Gram Positive Rods    69F pmhx of early onset Alzheimer disease, recent hip fracture from osteoporosis s/p repair last month, anxiety/depression, asthma, HLD and GERD who presents with severe sepsis and acute back pain and LE weakness.     Found to have mrsa bacteremia and septic hip; followed by ID who is aware of latest blood cx results    Hospital course include s/p leisa for septic left hip, stormy post op course now on suppressive minocycline due to hardware in knee and prolonged bacteremia. No fever for past day. Positive culture with grp is a contaminant. No uncontrolled infection is apparent at present. Plan is to continue minocycline      BENJI Zavala [id number]
#septic shock. K.pneumo bacteremia, acute GIB    Febrile to 100.8, still remains on pressor but levo requirement improving.  NGT output is billous and no obvious bleeding from NGT or rectum; however patient keep dropping Hgb and rquiring RBC transfusion.  BCx 7/30 ngtd, K.pneumo pan-susceptible except for amp.  Stop alexis, switch to CTX 1g IV q24h + flagyl 500mg IV q8h.  Agree with CT c/a/p to look for the source of bleed.      Team 2 will follow you.  Case d/w primary team.    Milagro Luevano MD, MS  Infectious Disease attending  work cell [phone]   For any questions during evening/weekend/holiday, please page ID on call
#septic shock. K.pneumo bacteremia, acute GIB, infectious colitis     Febrile to 100.8, off pressor.  BCx 7/30 ngtd, K.pneumo pan-susceptible except for amp.  CT a/p showed infetious vs inflammatory colitis, duodenal ulcer.  Plan for EGD today per GI.  Cont CTX 1g IV q24h + flagyl 500mg IV q8h.       Team 2 will follow you.  Case d/w primary team.    Milagro Luevano MD, MS  Infectious Disease attending  work cell [phone]   For any questions during evening/weekend/holiday, please page ID on call.
#septic shock. K.pneumo bacteremia, acute GIB, infectious colitis     Patient had EGD yesterday which showed severe esophagitis which is likely the cause of GIB.  Overnight febrile to 100.4 - likely due to post-procedural.   BCx 7/30 ngtd, K.pneumo pan-susceptible except for amp.  CT a/p showed infectious vs inflammatory colitis, duodenal ulcer.  Plan for EGD today per GI.  Cont CTX 1g IV q24h + flagyl 500mg IV q8h.   Plan to treat with 7 days of abx from first negative BCx.      Team 2 will follow you.  Dr. Raymond will take over the care tomorrow.  Case d/w primary team.    Milagro Luevano MD, MS  Infectious Disease attending  work cell [phone]   For any questions during evening/weekend/holiday, please page ID on call.
Management of VAP and bacteremia
Management of bacteremia
Management of bacteremia
Wild type Pseudomonas VAP and secondary BSI; Enterococcus faecalis and Clostridium perfringens BSI. Advise f/u surveillance bcx 8/8 PM, continue Zosyn, defer redosing of tobramycin given susceptible Pseudomonas isolate; f/u GOC.

## 2023-08-11 NOTE — PROVIDER CONTACT NOTE (CHANGE IN STATUS NOTIFICATION) - ASSESSMENT
Pt remains trach to vent on vasopressin, phenylephrine, norepinephrine, propofol, fentanyl, and nimbex gtts.

## 2023-08-11 NOTE — PROVIDER CONTACT NOTE (CRITICAL VALUE NOTIFICATION) - ASSESSMENT
Pt drowsy yet restless and constantly climbing OOB, wrist restraints on and patient with sitter.
Lactate 8.5 from stat labs
Pt non responsive verbally VS BP 71/43 HR 98 RR 23 O2 sat 94% POCT Glu 37
Baseline neuro
Blood culture drawn on 7/28, positive result for gram negative rods in aerobic tube
Pt on Levo, Steven, Vaso gtt for MAPs above 65. On Prop, Fentanyl & Nimbex. Pt trached to [facility]

## 2023-08-11 NOTE — PROGRESS NOTE ADULT - SUBJECTIVE AND OBJECTIVE BOX
=================================  NEUROCRITICAL CARE ATTENDING NOTE  =================================    JORDAN CHAKRABORTY   MRN-[id number]  Summary:  68y/M  with type 2 diabetes, hyperlipidemia, iron deficiency anemia, tracheal stenosis s/p tracheostomy (which was closed by ENT 7 days ago); glioblastoma s/p resection 1/27/2022, and Avastin treatment 3/2023 presenting with altered mental status and weakness x2 days. Per family, they last saw him last night around 6pm when he was at baseline. Patient has intermittent expressive aphasia but is getting worse and now is persistent, and has intermittent nausea and vomiting since last night. Patient was supposed to receive an outpatient brain MRI on 7/25/2023. Stroke code performed in  is negative for CVA. Head CT shows new hyperdensity in frontal parietal - surgical site. MRI is ordered to rule out tumor recurrence.  Per daughter at bedside, Entresto and Eliquis were stopped per his PC - cardiologist.   (07 Jul 2023 13:10)    COURSE IN THE HOSPITAL:  07/07 Admitted to [facility], started on 3%  07/08 37.8 pancultured No significant events overnight.   07/09 No significant events overnight.    07/29 back to ICU for septic shock - Klebsiella  08/07 back on pressors overnight, CT overnight, 1/2 amp glucose given  08/08 Tmax 38.3 septic shock, bacteremic  08/09 Tmax 38.2 desaturation overnight, pressor requirements increased, stared on  austin / vasopressin; CXR ARDS, paralyzed with  nimbex, given Ca Gluc, 1 amp NaHCO3, given platelet (40s): INR 1.9, given 10 vit K, given tobra x1 dose, vanc extra dose;  08/10 Tmax 38.7 remains paralyzed, given tobra x1 yesterday; platelets down to 18   08/11 Tmax 37.8    Past Medical History: No pertinent past medical history diabetes mellitus Hypertension  Glioblastoma  Type 2 diabetes mellitus  Hyperlipidemia  Iron deficiency anemia  Nephrolithiasis  Thrombocytopenia  Hyponatremia  BPH (benign prostatic hyperplasia)  Allergies:  amoxicillin (Rash)  Home meds:   ·	aspirin 81 mg oral tablet: 1 tab(s) orally once a day  ·	atorvastatin 20 mg oral tablet: 1 tab(s) orally once a day (at bedtime)  ·	escitalopram 5 mg oral tablet: 1 tab(s) orally once a day  ·	fluticasone 50 mcg/inh nasal spray: 1 spray(s) nasal 2 times a day  ·	Januvia 100 mg oral tablet: 1 tab(s) orally once a day  ·	levETIRAcetam 750 mg oral tablet: 1 tab(s) orally 2 times a day  ·	melatonin 3 mg oral tablet: 2 tab(s) orally once a day (at bedtime)  ·	ondansetron 4 mg oral tablet: 1 tab(s) orally once a day  ·	pantoprazole 40 mg oral delayed release tablet: 1 tab(s) orally once a day (before a meal)  ·	senna (sennosides) 8.6 mg oral tablet: 1 tab(s) orally once a day as needed for  constipation  ·	sodium chloride 1 g oral tablet: 2 tab(s) orally every 6 hours    PHYSICAL EXAMINATION  T(C): 37.6 (08-11 @ 05:35), Max: 37.8 (08-10 @ 16:00) HR: 90 (08-11 @ 07:00) (67 - 142) BP: 101/60 (08-10 @ 21:00) (101/60 - 118/70) RR: 22 (08-11 @ 07:00) (18 - 22) SpO2: 97% (08-11 @ 07:00) (96% - 100%)  NEUROLOGIC EXAMINATION:  Patient is paralyzed JUAN   GENERAL: 22 40 400 +8  EENT:  anicteric, trach site clean  CARDIOVASCULAR: (+) S1 S2, normal rate and regular rhythm  PULMONARY: clear to auscultation bilaterally  ABDOMEN: soft, nontender with normoactive bowel sounds  EXTREMITIES: no edema  SKIN: no rash    LABS: 08-11  CAPILLARY BLOOD GLUCOSE 158 156 171     (11.09) 7.3  (7.5)  11.07 )-----------( 17 (20)       ( 11 Aug 2023 02:22 )             22.5   (136)  143  |  114<H>  |  17  ----------------------------<  172<H>  2.8<LL>   |  21<L>  |  0.45<L>    Ca    7.7<L>      11 Aug 2023 02:22  Phos  2.2     08-11  Mg     1.6     08-11    TPro  4.1<L>  /  Alb  1.7<L>  /  TBili  2.3<H>  /  DBili  x   /  AST  42<H>  /  ALT  22  /  AlkPhos  73  08-09    08-10 @ 07:01  -  08-11 @ 07:00  IN: 5132 mL / OUT: 2260 mL / NET: 2872 mL    PT/INR - ( 10 Aug 2023 04:14 )   PT: 19.3 sec;   INR: 1.72    PTT - ( 10 Aug 2023 04:14 )  PTT:41.0 sec  INR TREND:1.72 (08-10 @ 04:14)1.85 (08-09 @ 02:38)1.99 (08-08 @ 20:08) 1.27 (08-04 @ 02:39) 1.26 (07-29 @ 15:34)    ABG - ( 10 Aug 2023 04:24 )pH, Arterial: 7.29  pH, Blood: x     /  pCO2: 39    /  pO2: 102   / HCO3: 19    / Base Excess: -7.3  /  SaO2: 99.1       Bacteriology:  08/08 blood CS NG1Dx2  08/08 sputum GS: numerous pseudomonas aeruginosa  08/08 Blood CS pseudomonas enterococcus clostridium perfringens  08/03 Blood CS NG5D x2   07/31 sputum rare to few Klebsiella    CSF studies:  EEG:  Neuroimaging:  Other imaging:    MEDICATIONS: 08-11    ·	piperacillin/tazobactam IVPB.. 4.5 IV Intermittent every 8 hours  ·	fentaNYL   Infusion. 0.5 IV Continuous <Continuous>  ·	lacosamide IVPB 50 IV Intermittent every 12 hours  ·	metoclopramide Injectable 10 IV Push every 8 hours  ·	metoprolol tartrate Injectable 5 milliGRAM(s) IV Push once  ·	acetylcysteine 10%  Inhalation 4 Inhalation every 6 hours  ·	albuterol/ipratropium for Nebulization 3 Nebulizer every 6 hours  ·	sodium chloride 3%  Inhalation 4 Inhalation every 6 hours  ·	pantoprazole  Injectable 40 IV Push every 12 hours  ·	simethicone 80 Chew every 6 hours  ·	sucralfate suspension 1 Oral every 6 hours  ·	atorvastatin 20 Oral at bedtime  ·	hydrocortisone sodium succinate Injectable 50 IV Push every 6 hours  ·	insulin lispro (ADMELOG) corrective regimen sliding scale  SubCutaneous every 6 hours  ·	lactobacillus acidophilus 1 Oral daily  ·	multivitamin 1 Oral daily  ·	petrolatum Ophthalmic Ointment 1 Both EYES three times a day  ·	phytonadione  IVPB 10 IV Intermittent daily  ·	sodium chloride 3 Oral every 6 hours  ·	acetaminophen     Tablet .. 650 Oral every 6 hours PRN  ·	fentaNYL    Injectable 50 IV Push every 2 hours PRN  ·	sodium chloride 0.9% lock flush 10 IV Push every 1 hour PRN  ·	sodium chloride 0.9% lock flush 10 IV Push every 1 hour PRN     IV FLUIDS: 3%@30cc/hr;   DRIPS:  ·	norepinephrine Infusion 0.05 MICROgram(s)/kG/Min IV Continuous <Continuous> 0.66  ·	phenylephrine    Infusion 0.1 MICROgram(s)/kG/Min IV Continuous <Continuous> - OFF  ·	vasopressin Infusion 0.04 IV Continuous <Continuous> 0.04  ·	cisatracurium Infusion 3 IV Continuous <Continuous> 3  ·	fentaNYL   Infusion. 0.5 IV Continuous <Continuous> 1.0  ·	propofol 50  DIET: OFF  Lines: R IJ Ceres  Drains:   Campuzano   Wounds:    CODE STATUS:  Full Code                       GOALS OF CARE:  aggressive                      DISPOSITION:  ICU =================================  NEUROCRITICAL CARE ATTENDING NOTE  =================================    JORDAN CHAKRABORTY   MRN-[id number]  Summary:  68y/M  with type 2 diabetes, hyperlipidemia, iron deficiency anemia, tracheal stenosis s/p tracheostomy (which was closed by ENT 7 days ago); glioblastoma s/p resection 1/27/2022, and Avastin treatment 3/2023 presenting with altered mental status and weakness x2 days. Per family, they last saw him last night around 6pm when he was at baseline. Patient has intermittent expressive aphasia but is getting worse and now is persistent, and has intermittent nausea and vomiting since last night. Patient was supposed to receive an outpatient brain MRI on 7/25/2023. Stroke code performed in  is negative for CVA. Head CT shows new hyperdensity in frontal parietal - surgical site. MRI is ordered to rule out tumor recurrence.  Per daughter at bedside, Entresto and Eliquis were stopped per his PC - cardiologist.   (07 Jul 2023 13:10)    COURSE IN THE HOSPITAL:  07/07 Admitted to [facility], started on 3%  07/08 37.8 pancultured No significant events overnight.   07/09 No significant events overnight.    07/29 back to ICU for septic shock - Klebsiella  08/07 back on pressors overnight, CT overnight, 1/2 amp glucose given  08/08 Tmax 38.3 septic shock, bacteremic  08/09 Tmax 38.2 desaturation overnight, pressor requirements increased, stared on  austin / vasopressin; CXR ARDS, paralyzed with  nimbex, given Ca Gluc, 1 amp NaHCO3, given platelet (40s): INR 1.9, given 10 vit K, given tobra x1 dose, vanc extra dose;  08/10 Tmax 38.7 remains paralyzed, given tobra x1 yesterday; platelets down to 18   08/11 Tmax 37.8    Past Medical History: No pertinent past medical history diabetes mellitus Hypertension  Glioblastoma  Type 2 diabetes mellitus  Hyperlipidemia  Iron deficiency anemia  Nephrolithiasis  Thrombocytopenia  Hyponatremia  BPH (benign prostatic hyperplasia)  Allergies:  amoxicillin (Rash)  Home meds:   ·	aspirin 81 mg oral tablet: 1 tab(s) orally once a day  ·	atorvastatin 20 mg oral tablet: 1 tab(s) orally once a day (at bedtime)  ·	escitalopram 5 mg oral tablet: 1 tab(s) orally once a day  ·	fluticasone 50 mcg/inh nasal spray: 1 spray(s) nasal 2 times a day  ·	Januvia 100 mg oral tablet: 1 tab(s) orally once a day  ·	levETIRAcetam 750 mg oral tablet: 1 tab(s) orally 2 times a day  ·	melatonin 3 mg oral tablet: 2 tab(s) orally once a day (at bedtime)  ·	ondansetron 4 mg oral tablet: 1 tab(s) orally once a day  ·	pantoprazole 40 mg oral delayed release tablet: 1 tab(s) orally once a day (before a meal)  ·	senna (sennosides) 8.6 mg oral tablet: 1 tab(s) orally once a day as needed for  constipation  ·	sodium chloride 1 g oral tablet: 2 tab(s) orally every 6 hours    PHYSICAL EXAMINATION  T(C): 37.6 (08-11 @ 05:35), Max: 37.8 (08-10 @ 16:00) HR: 90 (08-11 @ 07:00) (67 - 142) BP: 101/60 (08-10 @ 21:00) (101/60 - 118/70) RR: 22 (08-11 @ 07:00) (18 - 22) SpO2: 97% (08-11 @ 07:00) (96% - 100%)  NEUROLOGIC EXAMINATION:  Patient is paralyzed JUAN   GENERAL: 22 40 400 +8  EENT:  anicteric, trach site clean  CARDIOVASCULAR: (+) S1 S2, normal rate and regular rhythm  PULMONARY: clear to auscultation bilaterally  ABDOMEN: soft, nontender with normoactive bowel sounds  EXTREMITIES: no edema  SKIN: no rash    LABS: 08-11  CAPILLARY BLOOD GLUCOSE 158 156 171     (11.09) 7.3  (7.5)  11.07 )-----------( 17 (20)       ( 11 Aug 2023 02:22 )             22.5   (136)  143  |  114<H>  |  17  ----------------------------<  172<H>  2.8<LL>   |  21<L>  |  0.45<L>    Ca    7.7<L>      11 Aug 2023 02:22  Phos  2.2     08-11  Mg     1.6     08-11    TPro  4.1<L>  /  Alb  1.7<L>  /  TBili  2.3<H>  /  DBili  x   /  AST  42<H>  /  ALT  22  /  AlkPhos  73  08-09    08-10 @ 07:01  -  08-11 @ 07:00  IN: 5132 mL / OUT: 2260 mL / NET: 2872 mL    PT/INR - ( 10 Aug 2023 04:14 )   PT: 19.3 sec;   INR: 1.72    PTT - ( 10 Aug 2023 04:14 )  PTT:41.0 sec  INR TREND:1.72 (08-10 @ 04:14)1.85 (08-09 @ 02:38)1.99 (08-08 @ 20:08) 1.27 (08-04 @ 02:39) 1.26 (07-29 @ 15:34)    ABG - ( 10 Aug 2023 04:24 )pH, Arterial: 7.29  pH, Blood: x     /  pCO2: 39    /  pO2: 102   / HCO3: 19    / Base Excess: -7.3  /  SaO2: 99.1       Bacteriology:  08/08 blood CS NG1Dx2  08/08 sputum GS: numerous pseudomonas aeruginosa  08/08 Blood CS pseudomonas enterococcus clostridium perfringens  08/03 Blood CS NG5D x2   07/31 sputum rare to few Klebsiella    CSF studies:  EEG:  Neuroimaging:  Other imaging:    MEDICATIONS: 08-11    ·	piperacillin/tazobactam IVPB.. 4.5 IV Intermittent every 8 hours  ·	fentaNYL   Infusion. 0.5 IV Continuous <Continuous>  ·	lacosamide IVPB 50 IV Intermittent every 12 hours  ·	metoclopramide Injectable 10 IV Push every 8 hours  ·	metoprolol tartrate Injectable 5 milliGRAM(s) IV Push once  ·	acetylcysteine 10%  Inhalation 4 Inhalation every 6 hours  ·	albuterol/ipratropium for Nebulization 3 Nebulizer every 6 hours  ·	sodium chloride 3%  Inhalation 4 Inhalation every 6 hours  ·	pantoprazole  Injectable 40 IV Push every 12 hours  ·	simethicone 80 Chew every 6 hours  ·	sucralfate suspension 1 Oral every 6 hours  ·	atorvastatin 20 Oral at bedtime  ·	hydrocortisone sodium succinate Injectable 50 IV Push every 6 hours  ·	insulin lispro (ADMELOG) corrective regimen sliding scale  SubCutaneous every 6 hours  ·	lactobacillus acidophilus 1 Oral daily  ·	multivitamin 1 Oral daily  ·	petrolatum Ophthalmic Ointment 1 Both EYES three times a day  ·	phytonadione  IVPB 10 IV Intermittent daily  ·	sodium chloride 3 Oral every 6 hours  ·	acetaminophen     Tablet .. 650 Oral every 6 hours PRN  ·	fentaNYL    Injectable 50 IV Push every 2 hours PRN  ·	sodium chloride 0.9% lock flush 10 IV Push every 1 hour PRN  ·	sodium chloride 0.9% lock flush 10 IV Push every 1 hour PRN     IV FLUIDS: 3%@30cc/hr;   DRIPS:  ·	norepinephrine Infusion 0.05 MICROgram(s)/kG/Min IV Continuous <Continuous> 0.08  ·	phenylephrine    Infusion 0.1 MICROgram(s)/kG/Min IV Continuous <Continuous> - 6.5  ·	vasopressin Infusion 0.04 IV Continuous <Continuous> 0.04  ·	cisatracurium Infusion 3 IV Continuous <Continuous> 3  ·	fentaNYL   Infusion. 1.0 IV Continuous <Continuous> 1.0  ·	propofol 40  DIET: OFF  Lines: R IJ [place]ugger  Drains:   Campuzano   Wounds:    CODE STATUS:  Full Code                       GOALS OF CARE:  aggressive                      DISPOSITION:  ICU =================================  NEUROCRITICAL CARE ATTENDING NOTE  =================================    JORDAN CHAKRABORTY   MRN-[id number]  Summary:  68y/M  with type 2 diabetes, hyperlipidemia, iron deficiency anemia, tracheal stenosis s/p tracheostomy (which was closed by ENT 7 days ago); glioblastoma s/p resection 1/27/2022, and Avastin treatment 3/2023 presenting with altered mental status and weakness x2 days. Per family, they last saw him last night around 6pm when he was at baseline. Patient has intermittent expressive aphasia but is getting worse and now is persistent, and has intermittent nausea and vomiting since last night. Patient was supposed to receive an outpatient brain MRI on 7/25/2023. Stroke code performed in  is negative for CVA. Head CT shows new hyperdensity in frontal parietal - surgical site. MRI is ordered to rule out tumor recurrence.  Per daughter at bedside, Entresto and Eliquis were stopped per his PC - cardiologist.   (07 Jul 2023 13:10)    COURSE IN THE HOSPITAL:  07/07 Admitted to [facility], started on 3%  07/08 37.8 pancultured No significant events overnight.   07/09 No significant events overnight.    07/29 back to ICU for septic shock - Klebsiella  08/07 back on pressors overnight, CT overnight, 1/2 amp glucose given  08/08 Tmax 38.3 septic shock, bacteremic  08/09 Tmax 38.2 desaturation overnight, pressor requirements increased, stared on  austin / vasopressin; CXR ARDS, paralyzed with  nimbex, given Ca Gluc, 1 amp NaHCO3, given platelet (40s): INR 1.9, given 10 vit K, given tobra x1 dose, vanc extra dose;  08/10 Tmax 38.7 remains paralyzed, given tobra x1 yesterday; platelets down to 18, palliative GOC discussion - DNR   08/11 Tmax 37.8    Past Medical History: No pertinent past medical history diabetes mellitus Hypertension  Glioblastoma  Type 2 diabetes mellitus  Hyperlipidemia  Iron deficiency anemia  Nephrolithiasis  Thrombocytopenia  Hyponatremia  BPH (benign prostatic hyperplasia)  Allergies:  amoxicillin (Rash)  Home meds:   ·	aspirin 81 mg oral tablet: 1 tab(s) orally once a day  ·	atorvastatin 20 mg oral tablet: 1 tab(s) orally once a day (at bedtime)  ·	escitalopram 5 mg oral tablet: 1 tab(s) orally once a day  ·	fluticasone 50 mcg/inh nasal spray: 1 spray(s) nasal 2 times a day  ·	Januvia 100 mg oral tablet: 1 tab(s) orally once a day  ·	levETIRAcetam 750 mg oral tablet: 1 tab(s) orally 2 times a day  ·	melatonin 3 mg oral tablet: 2 tab(s) orally once a day (at bedtime)  ·	ondansetron 4 mg oral tablet: 1 tab(s) orally once a day  ·	pantoprazole 40 mg oral delayed release tablet: 1 tab(s) orally once a day (before a meal)  ·	senna (sennosides) 8.6 mg oral tablet: 1 tab(s) orally once a day as needed for  constipation  ·	sodium chloride 1 g oral tablet: 2 tab(s) orally every 6 hours    PHYSICAL EXAMINATION  T(C): 37.6 (08-11 @ 05:35), Max: 37.8 (08-10 @ 16:00) HR: 90 (08-11 @ 07:00) (67 - 142) BP: 101/60 (08-10 @ 21:00) (101/60 - 118/70) RR: 22 (08-11 @ 07:00) (18 - 22) SpO2: 97% (08-11 @ 07:00) (96% - 100%)  NEUROLOGIC EXAMINATION:  Patient is paralyzed JUAN   GENERAL: 22 40 400 +8  EENT:  anicteric, trach site clean  CARDIOVASCULAR: (+) S1 S2, normal rate and regular rhythm  PULMONARY: clear to auscultation bilaterally  ABDOMEN: soft, nontender with normoactive bowel sounds  EXTREMITIES: no edema  SKIN: no rash    LABS: 08-11  CAPILLARY BLOOD GLUCOSE 158 156 171  - given 2-2-2-2     (11.09) 7.3  (7.5)  11.07 )-----------( 17 (20)       ( 11 Aug 2023 02:22 )             22.5   (136)  143  |  114<H>  |  17  ----------------------------<  172<H>  2.8<LL>   |  21<L>  |  0.45<L>    Ca    7.7<L>      11 Aug 2023 02:22  Phos  2.2     08-11  Mg     1.6     08-11    TPro  4.1<L>  /  Alb  1.7<L>  /  TBili  2.3<H>  /  DBili  x   /  AST  42<H>  /  ALT  22  /  AlkPhos  73  08-09    lactate 2.3 <-- 2.5    08-10 @ 07:01  -  08-11 @ 07:00  IN: 5132 mL / OUT: 2260 mL / NET: 2872 mL    PT/INR - ( 10 Aug 2023 04:14 )   PT: 19.3 sec;   INR: 1.72    PTT - ( 10 Aug 2023 04:14 )  PTT:41.0 sec  INR TREND:1.72 (08-10 @ 04:14)1.85 (08-09 @ 02:38)1.99 (08-08 @ 20:08) 1.27 (08-04 @ 02:39) 1.26 (07-29 @ 15:34)    ABG - ( 10 Aug 2023 04:24 ) pH, Arterial: 7.29  pH, Blood: x     /  pCO2: 39    /  pO2: 102   / HCO3: 19    / Base Excess: -7.3  /  SaO2: 99.1       Bacteriology:  08/08 blood CS NG1Dx2  08/08 sputum GS: numerous pseudomonas aeruginosa  08/08 Blood CS pseudomonas enterococcus clostridium perfringens  08/03 Blood CS NG5D x2   07/31 sputum rare to few Klebsiella    CSF studies:  EEG:  Neuroimaging:  Other imaging:    MEDICATIONS: 08-11    ·	piperacillin/tazobactam IVPB.. 4.5 IV Intermittent every 8 hours  ·	lacosamide IVPB 50 IV Intermittent every 12 hours  ·	metoclopramide Injectable 10 IV Push every 8 hours  ·	acetylcysteine 10%  Inhalation 4 Inhalation every 6 hours  ·	albuterol/ipratropium for Nebulization 3 Nebulizer every 6 hours  ·	sodium chloride 3%  Inhalation 4 Inhalation every 6 hours  ·	pantoprazole  Injectable 40 IV Push every 12 hours  ·	simethicone 80 Chew every 6 hours  ·	sucralfate suspension 1 Oral every 6 hours  ·	atorvastatin 20 Oral at bedtime  ·	hydrocortisone sodium succinate Injectable 50 IV Push every 6 hours  ·	insulin lispro (ADMELOG) corrective regimen sliding scale  SubCutaneous every 6 hours  ·	lactobacillus acidophilus 1 Oral daily  ·	multivitamin 1 Oral daily  ·	petrolatum Ophthalmic Ointment 1 Both EYES three times a day  ·	phytonadione  IVPB 10 IV Intermittent daily  ·	sodium chloride 3 Oral every 6 hours  ·	acetaminophen     Tablet .. 650 Oral every 6 hours PRN  ·	fentaNYL    Injectable 50 IV Push every 2 hours PRN  ·	sodium chloride 0.9% lock flush 10 IV Push every 1 hour PRN  ·	sodium chloride 0.9% lock flush 10 IV Push every 1 hour PRN     IV FLUIDS: OFF   DRIPS:  ·	norepinephrine Infusion 0.05 MICROgram(s)/kG/Min IV Continuous <Continuous> 0.06  ·	phenylephrine    Infusion 0.1 MICROgram(s)/kG/Min IV Continuous <Continuous> - 6.5  ·	vasopressin Infusion 0.04 IV Continuous <Continuous> 0.04  ·	cisatracurium Infusion 3 IV Continuous <Continuous> 3  ·	fentaNYL   Infusion. 1.0 IV Continuous <Continuous> 1.0  ·	propofol 40  DIET: OFF  Lines: R IJ L radial [place]  Drains:   Campuzano    Wounds:    CODE STATUS:  Full Code                       GOALS OF CARE:  aggressive                      DISPOSITION:  ICU

## 2023-08-11 NOTE — PROGRESS NOTE ADULT - SUBJECTIVE AND OBJECTIVE BOX
HPI:  Osiel Norwood is a 67YO male with a past medical history of type 2 diabetes, hyperlipidemia, iron deficiency anemia, tracheal stenosis s/p tracheostomy (which was closed by ENT 7 days ago); glioblastoma s/p resection 1/27/2022, and Avastin treatment 3/2023 presenting with altered mental status and weakness x2 days. Per family, they last saw him last night around 6pm when he was at baseline. Patient has intermittent expressive aphasia but is getting worse and now is persistent, and has intermittent nausea and vomiting since last night. Patient was supposed to receive an outpatient brain MRI on 7/25/2023.  Stroke code performed in  is negative for CVA. Head CT shows new hyperdensity in frontal parietal - surgical site. MRI is ordered to rule out tumor recurrence.   Per daughter at bedside, Entresto and Eliquis were stopped per his PC - cardiologist.      (07 Jul 2023 13:10)    INTERVAL EVENTS:  DNR after GOC conversation    HOSPITAL COURSE:  8/1: Tachycardic, gave 20 IV lasix. Hgb at the time stable. NS@20 for renal protection. lantus increased to 14u. Lactate normalized. Given 500cc bolus for soft pressures, restarted levo gtt temporarily, now off. Endoscopy done with GI showed severe circumferential esophagitis, small ulcer to anterior stomach. propofol changed to precedex.   8/2: ANA LILIA overnight, remains sedated on precedex and intubated on ACVC.  Pt tolerating CPAP. Campuzano removed, failed TOV, and straight catheterized. Trickle feeds started 10cc/hr. Changed protonix gtt transitioned to 40mg IV BID per GI. ENT consulted for trach planning.  8/3: Remains on levo. Campuzano replaced by urology due to difficulty straight catheterizing. TF held for high residuals. 1u PRBC transfused. 250cc albumin and 500cc NS bolus. Plan for trach with ENT 8/4.  8/4: ANA LILIA overnight. Neuro exam stable. Off levo since 12am. POD 0 trach with ENT. Increased Lantus to 20u 2/2 high FS. +RT 2/2 multiple loose stools. Nephrology consulted 2/2 urine appearance, NTD from their standpoint. S/p 2u platelets and 1u PRBCs.  8/5: High residuals of TF, reglan 10IV q8h started. Neuro exam stable. Duonebs, 3% inhalation, mucomyst standing for secretions. Ordered repeat hemolytic studies. DC'd cental line. Holding TF for high residuals. ABD XR w/ stool burden. SBP in 100s, MAP in the low 60s. Given 250cc of albumin, started midodrine 15q8. Given 500c bolus. RT d/c'd.  8/6: ANA LILIA o/n. Metamucil added for diarhea. Tube feeds remain paused for high residuals. Metamucil d/c'd. Trickle feeds resumed.  Platelet antibody test negative. CTH stable. POCUS w/ b-lines. Given 10 IV lasix. s/p albumin 250 cc for hypotension.   8/7: Precedex started for increased alertness and tachypnea. Levo gtt restarted for MAP goal > 65. D/c lexapro. Florinef 0.2mg QD, Na goal 135-145. Advanced TF today, assess for intolerance. Goal to taper levo to off. EEG placed, slowing left hemispheric region no seizures.changed lantus to 10   8/8: switched to full vent support for tachypnea, spiked a fever, pancultured. Start 3% @30cc/hr. Dc'd eeg, negative for seizures. Holding TF 2/2 high residual and poss aspiration, increased NS to maintenace dose. Started reglan 10q8 for abdominal distention. Pressor requirements increased heavily, got 2L bolus wide open. Started Vanc/zosyn empirically. CXR complete. Levo and austin gtt. [place] sump placed for GI decompresion and emesis, abd XR showing possible ileus vs SBO. Dc'd lantus. Given aother 1L bolus.   8/9: Pt requiring 3 vaspressors around 8pm MAPs in 40s. Pt noted to be desaturating to 86 despite FiO2 100%, given total 15mg nimbex. ABG with P/F ratio 61 and chest xray consistent with ARDS. Nimbex drip, fentanyl drip, and propofol drip started. FiO2 decreased to 60. Per ID Dr. Laughlin, recommended to give tobramycin x1 dose, extra dose vanc 500mg and increase maintenance to 1.5g bid. Blood cultures growing gram positive rods and gram positive cocci in chains and pairs. INR elevated 1.9, given vitamin k 10mg and plan to continue 5mg daily for 3 days. Persistent respiratory acidosis, given 1amp bicarb. Hypocalcemia, given 1g calcium gluconate given. Increased vitamin k to 10mg x 3 days. Changed levo to double strength. Got 1 unit platelets. Per ID DC'd vanc, given 460mg Tobramycin 2/2 level <2.   8/10: 1 dose tobramycin given at 10pm. Remains on imbex, propofol and fentanyl drips. Serum sodium stable. Neuro exam unchanged. Stess dose steroids 2/2 sepsis hydrocort [id number]. Went into rapid a.fib, given 3 lopressor. Rapid a. fib again, given 5 lopressor, cards consulted. Given 1u platelets. Heme recs if no bleeding, plt threshold 10 for transfusion. Pressor switched from levo to austin gtt. ID rec continue Zosyn, no need for Tobramycin based on sensitivities. family meeting w/ palliative- now DNR.       Vital Signs Last 24 Hrs  T(C): 37.4 (11 Aug 2023 01:20), Max: 37.8 (10 Aug 2023 16:00)  T(F): 99.3 (11 Aug 2023 01:20), Max: 100 (10 Aug 2023 16:00)  HR: 86 (11 Aug 2023 01:00) (67 - 142)  BP: 101/60 (10 Aug 2023 21:00) (101/60 - 118/70)  BP(mean): 75 (10 Aug 2023 21:00) (75 - 87)  RR: 22 (11 Aug 2023 01:00) (18 - 22)  SpO2: 97% (11 Aug 2023 01:00) (96% - 100%)    Parameters below as of 11 Aug 2023 01:00  Patient On (Oxygen Delivery Method): ventilator    O2 Concentration (%): 40    I&O's Summary    09 Aug 2023 07:01  -  10 Aug 2023 07:00  --------------------------------------------------------  IN: 4107.2 mL / OUT: 2290 mL / NET: 1817.2 mL    10 Aug 2023 07:01  -  11 Aug 2023 01:56  --------------------------------------------------------  IN: 3647.7 mL / OUT: 1700 mL / NET: 1947.7 mL          PHYSICAL EXAM:  General: Pt sitting up in bed, on full vent support, paralyzed on nimbex  HEENT: PERRL 3mm  Cardiovascular: RRR, normal S1 and S2   Respiratory: non-labored breathing, symmetric chest rise, on trach to full vent  GI: abd soft, ND   Neuro: A&O x 0, not following commands, limited motor exam as patient is currently paralyzed on nimbex  Wounds: tracheostomy site dressing dry, sutures in place    TUBES/LINES:  [X] CVC  [X] A-line  [] Lumbar Drain  [] Ventriculostomy  [] Other        LABS:                        7.5    11.09 )-----------( 20       ( 10 Aug 2023 20:34 )             23.0     08-10    136  |  109<H>  |  16  ----------------------------<  207<H>  4.0   |  18<L>  |  0.48<L>    Ca    7.2<L>      10 Aug 2023 04:14  Phos  2.8     08-10  Mg     1.7     08-10    TPro  4.1<L>  /  Alb  1.7<L>  /  TBili  2.3<H>  /  DBili  x   /  AST  42<H>  /  ALT  22  /  AlkPhos  73  08-09    PT/INR - ( 10 Aug 2023 04:14 )   PT: 19.3 sec;   INR: 1.72          PTT - ( 10 Aug 2023 04:14 )  PTT:41.0 sec  Urinalysis Basic - ( 10 Aug 2023 04:14 )    Color: x / Appearance: x / SG: x / pH: x  Gluc: 207 mg/dL / Ketone: x  / Bili: x / Urobili: x   Blood: x / Protein: x / Nitrite: x   Leuk Esterase: x / RBC: x / WBC x   Sq Epi: x / Non Sq Epi: x / Bacteria: x          CAPILLARY BLOOD GLUCOSE      POCT Blood Glucose.: 156 mg/dL (10 Aug 2023 23:32)  POCT Blood Glucose.: 171 mg/dL (10 Aug 2023 11:49)  POCT Blood Glucose.: 203 mg/dL (10 Aug 2023 06:17)      Drug Levels: [] N/A    CSF Analysis: [] N/A      Allergies    amoxicillin (Rash)  ure-Na (Rash; Fever; Hypotension)    Intolerances      MEDICATIONS:  Antibiotics:  piperacillin/tazobactam IVPB.. 4.5 Gram(s) IV Intermittent every 8 hours    Neuro:  acetaminophen     Tablet .. 650 milliGRAM(s) Oral every 6 hours PRN  cisatracurium Infusion 3 MICROgram(s)/kG/Min IV Continuous <Continuous>  fentaNYL    Injectable 50 MICROGram(s) IV Push every 2 hours PRN  fentaNYL   Infusion. 0.5 MICROgram(s)/kG/Hr IV Continuous <Continuous>  lacosamide IVPB 50 milliGRAM(s) IV Intermittent every 12 hours  metoclopramide Injectable 10 milliGRAM(s) IV Push every 8 hours  propofol Infusion 50 MICROgram(s)/kG/Min IV Continuous <Continuous>    Anticoagulation:    OTHER:  acetylcysteine 10%  Inhalation 4 milliLiter(s) Inhalation every 6 hours  albuterol/ipratropium for Nebulization 3 milliLiter(s) Nebulizer every 6 hours  atorvastatin 20 milliGRAM(s) Oral at bedtime  chlorhexidine 2% Cloths 1 Application(s) Topical <User Schedule>  chlorhexidine 4% Liquid 1 Application(s) Topical <User Schedule>  fludroCORTISONE 0.2 milliGRAM(s) Oral daily  hydrocortisone sodium succinate Injectable 50 milliGRAM(s) IV Push every 6 hours  insulin lispro (ADMELOG) corrective regimen sliding scale   SubCutaneous every 6 hours  lactobacillus acidophilus 1 Tablet(s) Oral daily  metoprolol tartrate Injectable 5 milliGRAM(s) IV Push once  norepinephrine Infusion 0.05 MICROgram(s)/kG/Min IV Continuous <Continuous>  pantoprazole  Injectable 40 milliGRAM(s) IV Push every 12 hours  petrolatum Ophthalmic Ointment 1 Application(s) Both EYES three times a day  phenylephrine    Infusion 0.2 MICROgram(s)/kG/Min IV Continuous <Continuous>  simethicone 80 milliGRAM(s) Chew every 6 hours  sodium chloride 3%  Inhalation 4 milliLiter(s) Inhalation every 6 hours  sucralfate suspension 1 Gram(s) Oral every 6 hours  vasopressin Infusion 0.04 Unit(s)/Min IV Continuous <Continuous>    IVF:  multivitamin 1 Tablet(s) Oral daily  phytonadione  IVPB 10 milliGRAM(s) IV Intermittent daily  sodium chloride 3 Gram(s) Oral every 6 hours  sodium chloride 3%. 500 milliLiter(s) IV Continuous <Continuous>    CULTURES:  Culture Results:   No growth at 1 day. (08-08 @ 20:53)  Culture Results:   No growth at 1 day. (08-08 @ 20:53)    RADIOLOGY & ADDITIONAL TESTS:      ASSESSMENT:  67 YO male PMH T2DM, HLD, JAGDISH, tracheal stenosis s/p tracheostomy (s/p closure); glioblastoma s/p resection 1/27/2022, and [place] Avastin treatment 3/2023 adm with aphasia, N/V and severe hypoNa (118), imaging showing disesase progression, Hospital course complicated by sepsis secondary to bacteremia, decompensated, intubated, started on antibiotics and upgrated to ICU on 7/29. S/p trach with ENT (8/4/23). Now with recurrent septic shock and bacteremia, hypoxic respiratory failure, ARDS    Plan:  Neuro:  - pupil checks q4/vitals q1hrs   - CTH 7/7: Interval development of a 13 mm hyperdense nodule along the inferior margin of the resection cavity which may represent progression of disease with hemorrhage. Repeat CTH stable, CTH 7/17 stable   - CTA 7/7: negative, CTA 7/17 stable  - CTP 7/17 stable.   - fentanyl drip, propofol drip for RASS -3 to -5, nimbex drip train 2/4  - vimpat 50mg BID (switched from keppra 2/2 thrombocytopenia) for seizure tx  - Decadron 2mg BID  - c/w home Lexapro 5mg (home dose 5mg)  - home ASA 81 held i/s/o thrombocytopenia   - MRI brain 7/9 suspicious for recurrent tumor and hydro   - eeg dc'd 8/8, negative for seizures    Pulm:  - Trach (6 Shiley) to full vent support 40/400/22/5  - respiratory acidosis, trend  - duonebs/mucomyst/3% inhalation q6    Cardio:  - MAP goal >65, austin gtt, vasopressin gtt  - A. fib w/ RVR 8/10  - 4/17 outpatient echo EF 65%  - echo 7/31: 60-65%, 8/9: EF 62%,  - 8/8 QTc 503    GI:  - [place] sump to LIWS for ileus, NPO   - protonix BID and carafate 1g q6h for UGI bleed   - GI following, s/p EGD 8/1: severe circumferential esophagitis, small ulcer to anterior stomach  - reglan 10 IV q8h for GI dysmotility    Renal/:  - Hyponatremic (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 as well as SSRI use), s/p tolvaptan 7/21  - NaCl tabs 3g q6, Florinef 0.2 BID, 3% @ 30cc/hr  - Campuzano replaced 8/3 by urology for difficult straight cath    Endo:  - A1c 5.7  - ISS  - h/o T2DM: home Januvia held  - h/o HLD: c/w Atorvastatin 20mg    Heme:  - SCDs for DVT ppx, SQL and Aspirin 81 held for thrombocytopenia   - INR 1.9 elevated, trend. Vit k 10mg x3d  - heme consulted for thrombocytopenia- w/u sent, likely chronic thrombocytopenia d/t chemotherapy agent in past   - Per heme transfuse 1U plt if bleeding and platelet count <50k, if febrile and platelet count <20k. Hold transfusing platelets until tomorrow before procedure.   - 1u PRBC, 1U PLT 7/29, 2u PRBC, 1u PLT 7/30, 1u PRBC 8/3, 2u PLT 8/4, 1u PRBC 8/4, 1u PLT 8/9  - LE dopplers 7/8 negative  - LUE doppler 7/29: L superficial cephalic vein thrombophlebitis  - CTA PE protocol for tachycardia: negative     ID:  - 7/28, bcx growing K. pneumoniae, sputum cx K. pneumoniae  - 8/8 blood cx growing pseudomonas, enterococcus and clostridium  - meropenem 1g q8hrs (7/28-7/31), changed to Ceftriaxone/Flagyl per ID recs (7/31-8/5)  - CT CAP 7/31: Wall thickening in the colon suggesting an infectious or inflammatory colitis, Suspected small duodenal ulcer with surrounding edema in, Distended gallbladder w/o cholecystitis. Mild ascites. Bibasilar pneumonia L>R.  - Febrile, leukocytosis and tachycardia with elevated lactic acid on 7/17  - vancomycin/meropenem empirically (7/17-19), d/c per ID and repeat blood cx 7/19, NGTD  - Started Vanc/Zosyn started empirically (8/8-), s/p tobramycin IV x1 8/8 per ID    Dispo:   - NSICU status, DNR    D/w Dr. Dorado and Dr. Hendrix HPI:  Osiel Norwood is a 69YO male with a past medical history of type 2 diabetes, hyperlipidemia, iron deficiency anemia, tracheal stenosis s/p tracheostomy (which was closed by ENT 7 days ago); glioblastoma s/p resection 1/27/2022, and Avastin treatment 3/2023 presenting with altered mental status and weakness x2 days. Per family, they last saw him last night around 6pm when he was at baseline. Patient has intermittent expressive aphasia but is getting worse and now is persistent, and has intermittent nausea and vomiting since last night. Patient was supposed to receive an outpatient brain MRI on 7/25/2023.  Stroke code performed in  is negative for CVA. Head CT shows new hyperdensity in frontal parietal - surgical site. MRI is ordered to rule out tumor recurrence.   Per daughter at bedside, Entresto and Eliquis were stopped per his PC - cardiologist.      (07 Jul 2023 13:10)    INTERVAL EVENTS:  DNR after GOC conversation    HOSPITAL COURSE:  8/1: Tachycardic, gave 20 IV lasix. Hgb at the time stable. NS@20 for renal protection. lantus increased to 14u. Lactate normalized. Given 500cc bolus for soft pressures, restarted levo gtt temporarily, now off. Endoscopy done with GI showed severe circumferential esophagitis, small ulcer to anterior stomach. propofol changed to precedex.   8/2: ANA LILIA overnight, remains sedated on precedex and intubated on ACVC.  Pt tolerating CPAP. Campuzano removed, failed TOV, and straight catheterized. Trickle feeds started 10cc/hr. Changed protonix gtt transitioned to 40mg IV BID per GI. ENT consulted for trach planning.  8/3: Remains on levo. Campuzano replaced by urology due to difficulty straight catheterizing. TF held for high residuals. 1u PRBC transfused. 250cc albumin and 500cc NS bolus. Plan for trach with ENT 8/4.  8/4: ANA LILIA overnight. Neuro exam stable. Off levo since 12am. POD 0 trach with ENT. Increased Lantus to 20u 2/2 high FS. +RT 2/2 multiple loose stools. Nephrology consulted 2/2 urine appearance, NTD from their standpoint. S/p 2u platelets and 1u PRBCs.  8/5: High residuals of TF, reglan 10IV q8h started. Neuro exam stable. Duonebs, 3% inhalation, mucomyst standing for secretions. Ordered repeat hemolytic studies. DC'd cental line. Holding TF for high residuals. ABD XR w/ stool burden. SBP in 100s, MAP in the low 60s. Given 250cc of albumin, started midodrine 15q8. Given 500c bolus. RT d/c'd.  8/6: ANA LILIA o/n. Metamucil added for diarhea. Tube feeds remain paused for high residuals. Metamucil d/c'd. Trickle feeds resumed.  Platelet antibody test negative. CTH stable. POCUS w/ b-lines. Given 10 IV lasix. s/p albumin 250 cc for hypotension.   8/7: Precedex started for increased alertness and tachypnea. Levo gtt restarted for MAP goal > 65. D/c lexapro. Florinef 0.2mg QD, Na goal 135-145. Advanced TF today, assess for intolerance. Goal to taper levo to off. EEG placed, slowing left hemispheric region no seizures.changed lantus to 10   8/8: switched to full vent support for tachypnea, spiked a fever, pancultured. Start 3% @30cc/hr. Dc'd eeg, negative for seizures. Holding TF 2/2 high residual and poss aspiration, increased NS to maintenace dose. Started reglan 10q8 for abdominal distention. Pressor requirements increased heavily, got 2L bolus wide open. Started Vanc/zosyn empirically. CXR complete. Levo and austin gtt. [place] sump placed for GI decompresion and emesis, abd XR showing possible ileus vs SBO. Dc'd lantus. Given aother 1L bolus.   8/9: Pt requiring 3 vaspressors around 8pm MAPs in 40s. Pt noted to be desaturating to 86 despite FiO2 100%, given total 15mg nimbex. ABG with P/F ratio 61 and chest xray consistent with ARDS. Nimbex drip, fentanyl drip, and propofol drip started. FiO2 decreased to 60. Per ID Dr. Laughlin, recommended to give tobramycin x1 dose, extra dose vanc 500mg and increase maintenance to 1.5g bid. Blood cultures growing gram positive rods and gram positive cocci in chains and pairs. INR elevated 1.9, given vitamin k 10mg and plan to continue 5mg daily for 3 days. Persistent respiratory acidosis, given 1amp bicarb. Hypocalcemia, given 1g calcium gluconate given. Increased vitamin k to 10mg x 3 days. Changed levo to double strength. Got 1 unit platelets. Per ID DC'd vanc, given 460mg Tobramycin 2/2 level <2.   8/10: 1 dose tobramycin given at 10pm. Remains on imbex, propofol and fentanyl drips. Serum sodium stable. Neuro exam unchanged. Stess dose steroids 2/2 sepsis hydrocort 20m7uqx. Went into rapid a.fib, given 3 lopressor. Rapid a. fib again, given 5 lopressor, cards consulted. Given 1u platelets. Heme recs if no bleeding, plt threshold 10 for transfusion. Pressor switched from levo to austin gtt. ID rec continue Zosyn, no need for Tobramycin based on sensitivities. family meeting w/ palliative- now DNR.   8/11: O/n Na 143, 3% stopped, florinef d/c'd. Cont strict NPO for ileus, NGT to LIMWS. K repletion for hypoK, f/u repeat BMP. Cont ongoing GOC discussion. Remains intubated on full vent support, drips: prop, fent, levo, austin, nimbex.       Vital Signs Last 24 Hrs  T(C): 37.4 (11 Aug 2023 01:20), Max: 37.8 (10 Aug 2023 16:00)  T(F): 99.3 (11 Aug 2023 01:20), Max: 100 (10 Aug 2023 16:00)  HR: 86 (11 Aug 2023 01:00) (67 - 142)  BP: 101/60 (10 Aug 2023 21:00) (101/60 - 118/70)  BP(mean): 75 (10 Aug 2023 21:00) (75 - 87)  RR: 22 (11 Aug 2023 01:00) (18 - 22)  SpO2: 97% (11 Aug 2023 01:00) (96% - 100%)    Parameters below as of 11 Aug 2023 01:00  Patient On (Oxygen Delivery Method): ventilator    O2 Concentration (%): 40    I&O's Summary    09 Aug 2023 07:01  -  10 Aug 2023 07:00  --------------------------------------------------------  IN: 4107.2 mL / OUT: 2290 mL / NET: 1817.2 mL    10 Aug 2023 07:01  -  11 Aug 2023 01:56  --------------------------------------------------------  IN: 3647.7 mL / OUT: 1700 mL / NET: 1947.7 mL          PHYSICAL EXAM:  General: Pt sitting up in bed, on full vent support, paralyzed on nimbex, eyes open   HEENT: PERRL 3mm (via pupillometer)   Cardiovascular: RRR, normal S1 and S2   Respiratory: non-labored breathing, symmetric chest rise, on trach to full vent  GI: abd soft, ND   Neuro: A&O x 0, not following commands, limited motor exam as patient is currently paralyzed on nimbex  Wounds: tracheostomy site dressing dry, sutures in place    TUBES/LINES:  [X] CVC  [X] A-line  [] Lumbar Drain  [] Ventriculostomy  [] Other        LABS:                        7.5    11.09 )-----------( 20       ( 10 Aug 2023 20:34 )             23.0     08-10    136  |  109<H>  |  16  ----------------------------<  207<H>  4.0   |  18<L>  |  0.48<L>    Ca    7.2<L>      10 Aug 2023 04:14  Phos  2.8     08-10  Mg     1.7     08-10    TPro  4.1<L>  /  Alb  1.7<L>  /  TBili  2.3<H>  /  DBili  x   /  AST  42<H>  /  ALT  22  /  AlkPhos  73  08-09    PT/INR - ( 10 Aug 2023 04:14 )   PT: 19.3 sec;   INR: 1.72          PTT - ( 10 Aug 2023 04:14 )  PTT:41.0 sec  Urinalysis Basic - ( 10 Aug 2023 04:14 )    Color: x / Appearance: x / SG: x / pH: x  Gluc: 207 mg/dL / Ketone: x  / Bili: x / Urobili: x   Blood: x / Protein: x / Nitrite: x   Leuk Esterase: x / RBC: x / WBC x   Sq Epi: x / Non Sq Epi: x / Bacteria: x          CAPILLARY BLOOD GLUCOSE      POCT Blood Glucose.: 156 mg/dL (10 Aug 2023 23:32)  POCT Blood Glucose.: 171 mg/dL (10 Aug 2023 11:49)  POCT Blood Glucose.: 203 mg/dL (10 Aug 2023 06:17)      Drug Levels: [] N/A    CSF Analysis: [] N/A      Allergies    amoxicillin (Rash)  ure-Na (Rash; Fever; Hypotension)    Intolerances      MEDICATIONS:  Antibiotics:  piperacillin/tazobactam IVPB.. 4.5 Gram(s) IV Intermittent every 8 hours    Neuro:  acetaminophen     Tablet .. 650 milliGRAM(s) Oral every 6 hours PRN  cisatracurium Infusion 3 MICROgram(s)/kG/Min IV Continuous <Continuous>  fentaNYL    Injectable 50 MICROGram(s) IV Push every 2 hours PRN  fentaNYL   Infusion. 0.5 MICROgram(s)/kG/Hr IV Continuous <Continuous>  lacosamide IVPB 50 milliGRAM(s) IV Intermittent every 12 hours  metoclopramide Injectable 10 milliGRAM(s) IV Push every 8 hours  propofol Infusion 50 MICROgram(s)/kG/Min IV Continuous <Continuous>    Anticoagulation:    OTHER:  acetylcysteine 10%  Inhalation 4 milliLiter(s) Inhalation every 6 hours  albuterol/ipratropium for Nebulization 3 milliLiter(s) Nebulizer every 6 hours  atorvastatin 20 milliGRAM(s) Oral at bedtime  chlorhexidine 2% Cloths 1 Application(s) Topical <User Schedule>  chlorhexidine 4% Liquid 1 Application(s) Topical <User Schedule>  fludroCORTISONE 0.2 milliGRAM(s) Oral daily  hydrocortisone sodium succinate Injectable 50 milliGRAM(s) IV Push every 6 hours  insulin lispro (ADMELOG) corrective regimen sliding scale   SubCutaneous every 6 hours  lactobacillus acidophilus 1 Tablet(s) Oral daily  metoprolol tartrate Injectable 5 milliGRAM(s) IV Push once  norepinephrine Infusion 0.05 MICROgram(s)/kG/Min IV Continuous <Continuous>  pantoprazole  Injectable 40 milliGRAM(s) IV Push every 12 hours  petrolatum Ophthalmic Ointment 1 Application(s) Both EYES three times a day  phenylephrine    Infusion 0.2 MICROgram(s)/kG/Min IV Continuous <Continuous>  simethicone 80 milliGRAM(s) Chew every 6 hours  sodium chloride 3%  Inhalation 4 milliLiter(s) Inhalation every 6 hours  sucralfate suspension 1 Gram(s) Oral every 6 hours  vasopressin Infusion 0.04 Unit(s)/Min IV Continuous <Continuous>    IVF:  multivitamin 1 Tablet(s) Oral daily  phytonadione  IVPB 10 milliGRAM(s) IV Intermittent daily  sodium chloride 3 Gram(s) Oral every 6 hours  sodium chloride 3%. 500 milliLiter(s) IV Continuous <Continuous>    CULTURES:  Culture Results:   No growth at 1 day. (08-08 @ 20:53)  Culture Results:   No growth at 1 day. (08-08 @ 20:53)    RADIOLOGY & ADDITIONAL TESTS:      ASSESSMENT:  69 YO male PMH T2DM, HLD, JAGDISH, tracheal stenosis s/p tracheostomy (s/p closure); glioblastoma s/p resection 1/27/2022, and [place] Avastin treatment 3/2023 adm with aphasia, N/V and severe hypoNa (118), imaging showing disesase progression, Hospital course complicated by sepsis secondary to bacteremia, decompensated, intubated, started on antibiotics and upgrated to ICU on 7/29. S/p trach with ENT (8/4/23). Now with recurrent septic shock and bacteremia, hypoxic respiratory failure, ARDS, requiring nimbex drip and deep sedation for vent synchrony.     Plan:  Neuro:  - pupil checks q4/vitals q1hrs   - CTH 7/7: Interval development of a 13 mm hyperdense nodule along the inferior margin of the resection cavity which may represent progression of disease with hemorrhage. Repeat CTH stable, CTH 7/17 stable   - CTA 7/7: negative, CTA 7/17 stable  - CTP 7/17 stable.   - fentanyl drip, propofol drip for RASS -3 to -5, nimbex drip train 2/4  - vimpat 50mg BID (switched from keppra 2/2 thrombocytopenia) for seizure tx  - Decadron 2mg BID  - c/w home Lexapro 5mg (home dose 5mg)  - home ASA 81 held i/s/o thrombocytopenia   - MRI brain 7/9 suspicious for recurrent tumor and hydro   - eeg dc'd 8/8, negative for seizures    Pulm:  - Trach (6 Shiley) to full vent support 40/400/22/5  - respiratory acidosis, trend  - duonebs/mucomyst/3% inhalation q6    Cardio:  - MAP goal >65, [place] gtt, vasopressin gtt  - A. fib w/ RVR 8/10  - 4/17 outpatient echo EF 65%  - echo 7/31: 60-65%, 8/9: EF 62%,  - 8/8 QTc 503    GI:  - [place] sump to LIWS for ileus, NPO   - protonix BID and carafate 1g q6h for UGI bleed   - GI following, s/p EGD 8/1: severe circumferential esophagitis, small ulcer to anterior stomach  - reglan 10 IV q8h for GI dysmotility    Renal/:  - Hyponatremic (suspected to be SIADH in the setting of intracranial pathology as well as SSRI use), s/p tolvaptan 7/21  - NaCl tabs 3g q6, Florinef 0.2 BID off, d/c'd 3% drip 8/11   - Campuzano replaced 8/3 by urology for difficult straight cath    Endo:  - A1c 5.7  - ISS  - h/o T2DM: home Januvia held  - h/o HLD: c/w Atorvastatin 20mg    Heme:  - SCDs for DVT ppx, SQL and Aspirin 81 held for thrombocytopenia   - INR 1.9 elevated, trend. Vit k 10mg x3d  - heme consulted for thrombocytopenia- w/u sent, likely chronic thrombocytopenia d/t chemotherapy agent in past   - Per heme transfuse 1U plt if bleeding and platelet count <50k, if febrile and platelet count <20k. Hold transfusing platelets until tomorrow before procedure.   - 1u PRBC, 1U PLT 7/29, 2u PRBC, 1u PLT 7/30, 1u PRBC 8/3, 2u PLT 8/4, 1u PRBC 8/4, 1u PLT 8/9  - LE dopplers 7/8 negative  - LUE doppler 7/29: L superficial cephalic vein thrombophlebitis  - CTA PE protocol for tachycardia: negative     ID:  - 7/28, bcx growing K. pneumoniae, sputum cx K. pneumoniae  - 8/8 blood cx growing pseudomonas, enterococcus and clostridium  - meropenem 1g q8hrs (7/28-7/31), changed to Ceftriaxone/Flagyl per ID recs (7/31-8/5)  - CT CAP 7/31: Wall thickening in the colon suggesting an infectious or inflammatory colitis, Suspected small duodenal ulcer with surrounding edema in, Distended gallbladder w/o cholecystitis. Mild ascites. Bibasilar pneumonia L>R.  - Febrile, leukocytosis and tachycardia with elevated lactic acid on 7/17  - vancomycin/meropenem empirically (7/17-19), d/c per ID and repeat blood cx 7/19, NGTD  - Started Vanc/Zosyn started empirically (8/8-), s/p tobramycin IV x1 8/8 per ID    Dispo:   - NSICU status, DNR (ongoing GOC discussion with family and palliative team)     D/w Dr. Dorado and Dr. Hendrix

## 2023-08-11 NOTE — PROGRESS NOTE ADULT - SUBJECTIVE AND OBJECTIVE BOX
NSCU ATTENDING -- ADDITIONAL PROGRESS NOTE    Nighttime rounds were performed     67 y/o male with PMH of type 2 diabetes, hyperlipidemia, iron deficiency anemia, tracheal stenosis s/p tracheostomy, glioblastoma s/p resection 1/27/2022, and Avastin treatment 3/2023 presenting with altered mental status and weakness x2 days. Per family, LKN 6pm on 07/05 when he was at baseline. Patient has intermittent expressive aphasia but is getting worse and now is persistent, and has intermittent nausea and vomiting since 07/05. Patient was scheduled to have outpatient brain MRI on 7/25/2023.  Stroke code called in ED. Head CT shows new hyperdensity in frontal parietal - surgical site. MRI ordered to rule out tumor recurrence.   Per daughter at bedside, Entresto and Eliquis were stopped per his PC - cardiologist.  (07 Jul 2023 13:10)      ICU Vital Signs Last 24 Hrs  T(C): 36.9 (11 Aug 2023 17:41), Max: 37.8 (11 Aug 2023 09:09)  T(F): 98.5 (11 Aug 2023 17:41), Max: 100 (11 Aug 2023 09:09)  HR: 102 (11 Aug 2023 19:00) (74 - 110)  BP: 101/60 (10 Aug 2023 21:00) (101/60 - 101/60)  BP(mean): 75 (10 Aug 2023 21:00) (75 - 75)  ABP: 95/40 (11 Aug 2023 19:00) (95/40 - 124/50)  ABP(mean): 60 (11 Aug 2023 19:00) (60 - 79)  RR: 12 (11 Aug 2023 19:00) (12 - 23)  SpO2: 99% (11 Aug 2023 19:00) (92% - 100%)      08-10-23 @ 07:01  -  08-11-23 @ 07:00  --------------------------------------------------------  IN: 5132 mL / OUT: 2310 mL / NET: 2822 mL    08-11-23 @ 07:01  -  08-11-23 @ 19:53  --------------------------------------------------------  IN: 1501.4 mL / OUT: 2750 mL / NET: -1248.6 mL      Mode: AC/ CMV (Assist Control/ Continuous Mandatory Ventilation), RR (machine): 22, TV (machine): 410, FiO2: 40, PEEP: 8, ITime: 1, MAP: 13, PIP: 26    PHYSICAL EXAM:  General: Cachectic in appearance. Temporal wasting  Intubated, sedated and paralysed  HEENT: Tracheostomy site C/D/I, NGT, MM dry  Neurological: Pupils 3mm and reactive with NPI 3  Pulmonary: Diminished throughout  Cardiovascular: S1, S2, RRR  Gastrointestinal: Soft,distended   : Campuzano catheter in place  Extremities: Upper extremity edema noted  Skin: Petechial rash diffusely over trunk and groin      MEDICATIONS:   acetaminophen     Tablet .. 650 milliGRAM(s) Oral every 6 hours PRN  acetylcysteine 10%  Inhalation 4 milliLiter(s) Inhalation every 6 hours  albuterol/ipratropium for Nebulization 3 milliLiter(s) Nebulizer every 6 hours  atorvastatin 20 milliGRAM(s) Oral at bedtime  chlorhexidine 2% Cloths 1 Application(s) Topical <User Schedule>  chlorhexidine 4% Liquid 1 Application(s) Topical <User Schedule>  cisatracurium Infusion 3 MICROgram(s)/kG/Min (13.1 mL/Hr) IV Continuous <Continuous>  fentaNYL    Injectable 50 MICROGram(s) IV Push every 2 hours PRN  fentaNYL   Infusion. 0.5 MICROgram(s)/kG/Hr (3.63 mL/Hr) IV Continuous <Continuous>  hydrocortisone sodium succinate Injectable 25 milliGRAM(s) IV Push every 8 hours  insulin lispro (ADMELOG) corrective regimen sliding scale   SubCutaneous every 6 hours  lacosamide IVPB 50 milliGRAM(s) IV Intermittent every 12 hours  lactobacillus acidophilus 1 Tablet(s) Oral daily  metoclopramide Injectable 10 milliGRAM(s) IV Push every 8 hours  metoprolol tartrate Injectable 5 milliGRAM(s) IV Push once  multivitamin 1 Tablet(s) Oral daily  norepinephrine Infusion 0.05 MICROgram(s)/kG/Min (3.4 mL/Hr) IV Continuous <Continuous>  pantoprazole  Injectable 40 milliGRAM(s) IV Push every 12 hours  petrolatum Ophthalmic Ointment 1 Application(s) Both EYES three times a day  phenylephrine    Infusion 0.2 MICROgram(s)/kG/Min (2.72 mL/Hr) IV Continuous <Continuous>  piperacillin/tazobactam IVPB.. 4.5 Gram(s) IV Intermittent every 8 hours  propofol Infusion 50 MICROgram(s)/kG/Min (21.8 mL/Hr) IV Continuous <Continuous>  simethicone 80 milliGRAM(s) Chew every 6 hours  sodium chloride 3 Gram(s) Oral every 6 hours  sodium chloride 0.9% lock flush 10 milliLiter(s) IV Push every 1 hour PRN  sodium chloride 0.9% lock flush 10 milliLiter(s) IV Push every 1 hour PRN  sodium chloride 3%  Inhalation 4 milliLiter(s) Inhalation every 6 hours  sucralfate suspension 1 Gram(s) Oral every 6 hours  vasopressin Infusion 0.04 Unit(s)/Min (6 mL/Hr) IV Continuous <Continuous>      LABS:                      7.3    11.07 )-----------( 17       ( 11 Aug 2023 02:22 )             22.5     08-11    142  |  110<H>  |  16  ----------------------------<  156<H>  2.6<LL>   |  23  |  0.45<L>    Ca    7.7<L>      11 Aug 2023 12:19  Phos  2.1     08-11  Mg     1.4     08-11      ABG - ( 11 Aug 2023 12:19 )  pH, Arterial: 7.41  pH, Blood: x     /  pCO2: 37    /  pO2: 81    / HCO3: 24    / Base Excess: -0.8  /  SaO2: 97.3        ASSESSMENT:   67 y/o M with septic shock secondary to Klebsiella bacteremia   Recurrent shock- likely septic secondary to PNA ?aspiration  Hypoxemic respiratory failure  ARDS  Bacteremia  GI bleed  GBM s/p resection, recent chemotherapy  History of Takotsubo cardiomyopathy  Chronic hyponatremia secondary to SIADH  Type 2 DM      PLAN  NEURO:  Pupil checks Q1  Analgesia: Fentanyl gtt  Sedation: Propofol RASS neg 5  Paralytic: Nimbex train of four 2/4 / vent synchrony  Seizure history: Continue vimpat. VEEG negative for seizures. DCed.  Cerebral edema: On dexamethasone 2mg Q12    ARDS likely secondary to aspiration - improving  Lung protective ventilation. Monitor peak/plateau pressures  CXR, ABG  VAP bundle. Pulmonary toilet  POCUS: B lines R>L. IVC difficult to visualize. B/L effusions.     CARDIOVASCULAR: Hypotension in setting of likely septic shock (recurrent); bacteremia, Afib RVR  MAP goal 65-70  Pressors: Levophed, phenylephrine and vasopressin  TTE: EF 60-65%. No WMA, no vegetation  Lactate: 2.5  Amiodarone DCed per cardiology as converted to NSR    GASTROENTEROLOGY: Esophagitis and non-bleeding ulcer, r/o ileus.  GI ppx: PPI bid. Continue sucralfate  Will need PEG eventually; gen surg  AXR 8/5- no significant ileus. Consider CT abd/pelvis once stable  On reglan. LBM: 8/7  Keep NPO for now. NGT to LIWS  GI following    RENAL/: Chronic hyponatremia secondary to SIADH, urine retention. Difficult catheterisation  Severe hypokalemia  Hyponatremia- improving. Hypertonics Dced  Campuzano catheter placed by Urology. Maintain per Urology. Monitor Is/Os  Na goal 135-145  Aggressively replete lyte    ENDOCRINE: Diabetes mellitus  A1c- 5.7  ISS while NPO. Monitor fingersticks    HEME/ONC: Pancytopenia likely chemo-induced vs sepsis, bone marrow suppression, R/O hemolysis, coagulopathy, anemia  DIC ruled out.   Trend H/H and plt. Hb goal >7.0, plt goal >10.   Hb 8.7-> 7.2  DVT ppx: Will hold chemical DVT ppx in setting of low platelets/ bleed  B/L SCDs  Heme following    INFECTIOUS: S/P Klebsiella bacteremia. Now with enterococcus faecalis, pseudomonas and clostridium perfringens bacteremia  On Zosyn  ID following. No repeat cultures indicated per ID    MISC:  Family meeting held on 8/10. Patient's family decided on DNR/DNI  [] awaiting [x] updated daughter and son-in- law at bedside [] family meeting    Continue care per daytime intensivist Bello AMIN    Additional critical care time: 60 minutes                                   NSCU ATTENDING -- ADDITIONAL PROGRESS NOTE    Nighttime rounds were performed     69 y/o male with PMH of type 2 diabetes, hyperlipidemia, iron deficiency anemia, tracheal stenosis s/p tracheostomy, glioblastoma s/p resection 1/27/2022, and Avastin treatment 3/2023 presenting with altered mental status and weakness x2 days. Per family, LKN 6pm on 07/05 when he was at baseline. Patient has intermittent expressive aphasia but is getting worse and now is persistent, and has intermittent nausea and vomiting since 07/05. Patient was scheduled to have outpatient brain MRI on 7/25/2023.  Stroke code called in ED. Head CT shows new hyperdensity in frontal parietal - surgical site. MRI ordered to rule out tumor recurrence.   Per daughter at bedside, Entresto and Eliquis were stopped per his PC - cardiologist.  (07 Jul 2023 13:10)      ICU Vital Signs Last 24 Hrs  T(C): 36.9 (11 Aug 2023 17:41), Max: 37.8 (11 Aug 2023 09:09)  T(F): 98.5 (11 Aug 2023 17:41), Max: 100 (11 Aug 2023 09:09)  HR: 102 (11 Aug 2023 19:00) (74 - 110)  BP: 101/60 (10 Aug 2023 21:00) (101/60 - 101/60)  BP(mean): 75 (10 Aug 2023 21:00) (75 - 75)  ABP: 95/40 (11 Aug 2023 19:00) (95/40 - 124/50)  ABP(mean): 60 (11 Aug 2023 19:00) (60 - 79)  RR: 12 (11 Aug 2023 19:00) (12 - 23)  SpO2: 99% (11 Aug 2023 19:00) (92% - 100%)      08-10-23 @ 07:01  -  08-11-23 @ 07:00  --------------------------------------------------------  IN: 5132 mL / OUT: 2310 mL / NET: 2822 mL    08-11-23 @ 07:01  -  08-11-23 @ 19:53  --------------------------------------------------------  IN: 1501.4 mL / OUT: 2750 mL / NET: -1248.6 mL      Mode: AC/ CMV (Assist Control/ Continuous Mandatory Ventilation), RR (machine): 22, TV (machine): 410, FiO2: 40, PEEP: 8, ITime: 1, MAP: 13, PIP: 26    PHYSICAL EXAM:  General: Cachectic in appearance. Temporal wasting  Intubated, sedated and paralysed  HEENT: Tracheostomy site C/D/I, NGT, MM dry  Neurological: Pupils 3mm and reactive with NPI 3  Pulmonary: Diminished throughout  Cardiovascular: S1, S2, RRR  Gastrointestinal: Soft,distended   : Campuzano catheter in place  Extremities: Upper extremity edema noted  Skin: Petechial rash diffusely over trunk and groin      MEDICATIONS:   acetaminophen     Tablet .. 650 milliGRAM(s) Oral every 6 hours PRN  acetylcysteine 10%  Inhalation 4 milliLiter(s) Inhalation every 6 hours  albuterol/ipratropium for Nebulization 3 milliLiter(s) Nebulizer every 6 hours  atorvastatin 20 milliGRAM(s) Oral at bedtime  chlorhexidine 2% Cloths 1 Application(s) Topical <User Schedule>  chlorhexidine 4% Liquid 1 Application(s) Topical <User Schedule>  cisatracurium Infusion 3 MICROgram(s)/kG/Min (13.1 mL/Hr) IV Continuous <Continuous>  fentaNYL    Injectable 50 MICROGram(s) IV Push every 2 hours PRN  fentaNYL   Infusion. 0.5 MICROgram(s)/kG/Hr (3.63 mL/Hr) IV Continuous <Continuous>  hydrocortisone sodium succinate Injectable 25 milliGRAM(s) IV Push every 8 hours  insulin lispro (ADMELOG) corrective regimen sliding scale   SubCutaneous every 6 hours  lacosamide IVPB 50 milliGRAM(s) IV Intermittent every 12 hours  lactobacillus acidophilus 1 Tablet(s) Oral daily  metoclopramide Injectable 10 milliGRAM(s) IV Push every 8 hours  metoprolol tartrate Injectable 5 milliGRAM(s) IV Push once  multivitamin 1 Tablet(s) Oral daily  norepinephrine Infusion 0.05 MICROgram(s)/kG/Min (3.4 mL/Hr) IV Continuous <Continuous>  pantoprazole  Injectable 40 milliGRAM(s) IV Push every 12 hours  petrolatum Ophthalmic Ointment 1 Application(s) Both EYES three times a day  phenylephrine    Infusion 0.2 MICROgram(s)/kG/Min (2.72 mL/Hr) IV Continuous <Continuous>  piperacillin/tazobactam IVPB.. 4.5 Gram(s) IV Intermittent every 8 hours  propofol Infusion 50 MICROgram(s)/kG/Min (21.8 mL/Hr) IV Continuous <Continuous>  simethicone 80 milliGRAM(s) Chew every 6 hours  sodium chloride 3 Gram(s) Oral every 6 hours  sodium chloride 0.9% lock flush 10 milliLiter(s) IV Push every 1 hour PRN  sodium chloride 0.9% lock flush 10 milliLiter(s) IV Push every 1 hour PRN  sodium chloride 3%  Inhalation 4 milliLiter(s) Inhalation every 6 hours  sucralfate suspension 1 Gram(s) Oral every 6 hours  vasopressin Infusion 0.04 Unit(s)/Min (6 mL/Hr) IV Continuous <Continuous>      LABS:                      7.3    11.07 )-----------( 17       ( 11 Aug 2023 02:22 )             22.5     08-11    142  |  110<H>  |  16  ----------------------------<  156<H>  2.6<LL>   |  23  |  0.45<L>    Ca    7.7<L>      11 Aug 2023 12:19  Phos  2.1     08-11  Mg     1.4     08-11      ABG - ( 11 Aug 2023 12:19 )  pH, Arterial: 7.41  pH, Blood: x     /  pCO2: 37    /  pO2: 81    / HCO3: 24    / Base Excess: -0.8  /  SaO2: 97.3        ASSESSMENT:   69 y/o M with septic shock secondary to Klebsiella bacteremia   Recurrent shock- likely septic secondary to PNA ?aspiration  Hypoxemic respiratory failure  ARDS  Bacteremia  GI bleed  GBM s/p resection, recent chemotherapy  History of Takotsubo cardiomyopathy  Chronic hyponatremia secondary to SIADH  Type 2 DM      PLAN  NEURO:  Pupil checks Q1  Analgesia: Fentanyl gtt  Sedation: Propofol RASS neg 5  Paralytic: Nimbex train of four 2/4 / vent synchrony  Seizure history: Continue vimpat. VEEG negative for seizures. DCed.  Cerebral edema: On dexamethasone 2mg Q12    ARDS likely secondary to aspiration - improving  Lung protective ventilation. Monitor peak/plateau pressures  CXR, ABG  VAP bundle. Pulmonary toilet  POCUS: B lines R>L. IVC difficult to visualize. B/L effusions.     CARDIOVASCULAR: Hypotension in setting of likely septic shock (recurrent); bacteremia, Afib RVR  MAP goal 65-70  Pressors: Levophed, phenylephrine and vasopressin  TTE: EF 60-65%. No WMA, no vegetation  Lactate: 2.5  Amiodarone DCed per cardiology as converted to NSR    GASTROENTEROLOGY: Esophagitis and non-bleeding ulcer, r/o ileus.  GI ppx: PPI bid. Continue sucralfate  Will need PEG eventually; gen surg  AXR 8/5- no significant ileus. Consider CT abd/pelvis once stable  On reglan. LBM: 8/7  Keep NPO for now. NGT to LIWS  GI following    RENAL/: Chronic hyponatremia secondary to SIADH, urine retention. Difficult catheterisation  Severe hypokalemia- replete  Hyponatremia- improving. Hypertonics Dced  Campuzano catheter placed by Urology. Maintain per Urology. Monitor Is/Os  Na goal 135-145  Aggressively replete lyte    ENDOCRINE: Diabetes mellitus  A1c- 5.7  ISS while NPO. Monitor fingersticks    HEME/ONC: Pancytopenia likely chemo-induced vs sepsis, bone marrow suppression, R/O hemolysis, coagulopathy, anemia  DIC ruled out.   Trend H/H and plt. Hb goal >7.0, plt goal >10.   Hb 8.7-> 7.2  DVT ppx: Will hold chemical DVT ppx in setting of low platelets/ bleed  B/L SCDs  Heme following    INFECTIOUS: S/P Klebsiella bacteremia. Now with enterococcus faecalis, pseudomonas and clostridium perfringens bacteremia  On Zosyn  ID following. No repeat cultures indicated per ID    MISC:  Family meeting held on 8/10. Patient's family decided on DNR/DNI  [] awaiting [x] updated daughter and son-in- law at bedside [] family meeting    Continue care per daytime intensivist Bello AMIN    Additional critical care time: 60 minutes

## 2023-08-11 NOTE — PROGRESS NOTE ADULT - TIME BILLING
Review of hospital course, labs, vitals, medical records.   Bedside exam and interview    Discussed plan of care with primary team   Documenting the encounter
Review of hospital course, labs, vitals, medical records.   Bedside exam and interview   Discussed plan of care with primary team ACP and housestaff   Documenting the encounter
Management of VAP and bacteremia
Review of hospital course, labs, vitals, medical records.   Bedside exam and interview    Discussed plan of care with primary team   Documenting the encounter
Review of hospital course, labs, vitals, medical records.   Bedside exam and interview   Discussed plan of care with primary team ACP and housestaff   Documenting the encounter
Review of hospital course, labs, vitals, medical records.   Bedside exam and interview   Discussed plan of care with primary team ACP and housestaff   Documenting the encounter
Review of hospital course, labs, vitals, medical records.   Bedside exam and interview    Discussed plan of care with primary team   Documenting the encounter
GBM recurrence, CoNS BSI 2/2 infiltrated PIV 7/2023, then more recent Klebsiella BSI ?GI source s/p course of ceftriaxone with clearance of bacteremia, then with acute decompensation/septic shock i/s/o ileus/high tube feed residuals, O2 desaturation c/f VAP; polymicrobial (Pseudomonas, E. faecalis, C. perfringens) BSI--given organisms are gut lesa, advise pursue CT AP if/when stable. Send surveillance bcx today 8/9; f/u bcx 8/8 for susceptibility/additional growth. Zosyn +/- redosing of tobramycin as above. Dismal prognosis--f/u GOC--family meeting pending tomorrow--would be appropriate to transition to hospice.
Management of bacteremia
Management of bacteremia
Review of hospital course, labs, vitals, medical records.   Bedside exam and interview    Discussed plan of care with primary team   Documenting the encounter
Wild type Pseudomonas VAP and secondary BSI; Enterococcus faecalis and Clostridium perfringens BSI. Advise f/u surveillance bcx 8/8 PM, continue Zosyn, defer redosing of tobramycin given susceptible Pseudomonas isolate; f/u GOC.
Emotional Support/Supportive Care and Clarification of Potential Disease Trajectory related to GBM  Exploration of GOC including advanced directives such as HCP designation and code status

## 2023-08-11 NOTE — PROVIDER CONTACT NOTE (CHANGE IN STATUS NOTIFICATION) - ACTION/TREATMENT ORDERED:
Dr. Monsalve spoke with family and decided to not increase pressors if required.  Pt fio2 increased to 60% and PEEP increased to 10.  Awaiting further interventions.
Eli hugger placed on patient, will continue to assess
25mcg fentanyl IVP and 3mg total of metoprolol IVP.  Labs sent  Awaiting further interventions
Pt seen by Neurology team, rapid response activated at 0615. Blood work taken, type and screen previously ordered after critical value reported at 0538, Levophed 8mg in 250 ml started at 0615 at 10.9 ml/hr VSS retaken q 2 minutes. Patients ambubagged and transferred to . POCT blood GLU 37 at given 1 amp D50 reassessed at 0647 POCT blood Glu 242.

## 2023-08-11 NOTE — PROGRESS NOTE ADULT - SUBJECTIVE AND OBJECTIVE BOX
INFECTIOUS DISEASES CONSULT FOLLOW-UP NOTE    INTERVAL HPI/OVERNIGHT EVENTS:    Patient seen and examined at bedside. ANA LILIA.  Intubated, on pressors and sedation. Unable to obtain ROS    ROS:   Constitutional, eyes, ENT, cardiovascular, respiratory, gastrointestinal, genitourinary, integumentary, neurological, psychiatric and heme/lymph are otherwise negative other than noted above       ANTIBIOTICS/RELEVANT:    MEDICATIONS  (STANDING):  acetylcysteine 10%  Inhalation 4 milliLiter(s) Inhalation every 6 hours  albuterol/ipratropium for Nebulization 3 milliLiter(s) Nebulizer every 6 hours  atorvastatin 20 milliGRAM(s) Oral at bedtime  chlorhexidine 2% Cloths 1 Application(s) Topical <User Schedule>  chlorhexidine 4% Liquid 1 Application(s) Topical <User Schedule>  cisatracurium Infusion 3 MICROgram(s)/kG/Min (13.1 mL/Hr) IV Continuous <Continuous>  fentaNYL   Infusion. 0.5 MICROgram(s)/kG/Hr (3.63 mL/Hr) IV Continuous <Continuous>  hydrocortisone sodium succinate Injectable 25 milliGRAM(s) IV Push every 8 hours  insulin lispro (ADMELOG) corrective regimen sliding scale   SubCutaneous every 6 hours  lacosamide IVPB 50 milliGRAM(s) IV Intermittent every 12 hours  lactobacillus acidophilus 1 Tablet(s) Oral daily  lipid, fat emulsion (Fish Oil and Plant Based) 20% Infusion 50 Gm/kG/Day (1513 mL/Hr) IV Continuous <Continuous>  metoclopramide Injectable 10 milliGRAM(s) IV Push every 8 hours  metoprolol tartrate Injectable 5 milliGRAM(s) IV Push once  multivitamin 1 Tablet(s) Oral daily  norepinephrine Infusion 0.05 MICROgram(s)/kG/Min (3.4 mL/Hr) IV Continuous <Continuous>  pantoprazole  Injectable 40 milliGRAM(s) IV Push every 12 hours  Parenteral Nutrition - Adult 1 Each TPN Continuous <Continuous>  petrolatum Ophthalmic Ointment 1 Application(s) Both EYES three times a day  phenylephrine    Infusion 0.2 MICROgram(s)/kG/Min (2.72 mL/Hr) IV Continuous <Continuous>  phytonadione  IVPB 10 milliGRAM(s) IV Intermittent daily  piperacillin/tazobactam IVPB.. 4.5 Gram(s) IV Intermittent every 8 hours  potassium chloride  10 mEq/100 mL IVPB 10 milliEquivalent(s) IV Intermittent every 1 hour  propofol Infusion 50 MICROgram(s)/kG/Min (21.8 mL/Hr) IV Continuous <Continuous>  simethicone 80 milliGRAM(s) Chew every 6 hours  sodium chloride 3 Gram(s) Oral every 6 hours  sodium chloride 3%  Inhalation 4 milliLiter(s) Inhalation every 6 hours  sucralfate suspension 1 Gram(s) Oral every 6 hours  vasopressin Infusion 0.04 Unit(s)/Min (6 mL/Hr) IV Continuous <Continuous>    MEDICATIONS  (PRN):  acetaminophen     Tablet .. 650 milliGRAM(s) Oral every 6 hours PRN Temp greater or equal to 38C (100.4F), Mild Pain (1 - 3)  fentaNYL    Injectable 50 MICROGram(s) IV Push every 2 hours PRN agitation/tachycardia/vent desynchrony  sodium chloride 0.9% lock flush 10 milliLiter(s) IV Push every 1 hour PRN Pre/post blood products, medications, blood draw, and to maintain line patency  sodium chloride 0.9% lock flush 10 milliLiter(s) IV Push every 1 hour PRN Pre/post blood products, medications, blood draw, and to maintain line patency        Vital Signs Last 24 Hrs  T(C): 37.6 (11 Aug 2023 05:35), Max: 37.8 (10 Aug 2023 16:00)  T(F): 99.6 (11 Aug 2023 05:35), Max: 100 (10 Aug 2023 16:00)  HR: 90 (11 Aug 2023 09:00) (67 - 142)  BP: 101/60 (10 Aug 2023 21:00) (101/60 - 118/70)  BP(mean): 75 (10 Aug 2023 21:00) (75 - 87)  RR: 22 (11 Aug 2023 09:00) (18 - 23)  SpO2: 96% (11 Aug 2023 09:00) (94% - 100%)    Parameters below as of 11 Aug 2023 09:00  Patient On (Oxygen Delivery Method): ventilator    O2 Concentration (%): 40    08-10-23 @ 07:01  -  08-11-23 @ 07:00  --------------------------------------------------------  IN: 5132 mL / OUT: 2310 mL / NET: 2822 mL    08-11-23 @ 07:01  -  08-11-23 @ 09:58  --------------------------------------------------------  IN: 350.1 mL / OUT: 175 mL / NET: 175.1 mL      PHYSICAL EXAM:  Constitutional: NAD, sedated   Eyes: the sclera and conjunctiva were normal.   ENT: the ears and nose were normal in appearance.   Neck: the appearance of the neck was normal and the neck was supple. +trach  Pulmonary: no respiratory distress and lungs were clear to auscultation bilaterally.   Heart: heart rate was normal and rhythm regular, normal S1 and S2  Vascular:. there was no peripheral edema  Abdomen: normal bowel sounds, soft, non-tender  Neurological: no focal deficits.   Psychiatric: the affect was normal        LABS:                        7.3    11.07 )-----------( 17       ( 11 Aug 2023 02:22 )             22.5     08-11    143  |  114<H>  |  17  ----------------------------<  172<H>  2.8<LL>   |  21<L>  |  0.45<L>    Ca    7.7<L>      11 Aug 2023 02:22  Phos  2.2     08-11  Mg     1.6     08-11    TPro  4.1<L>  /  Alb  1.7<L>  /  TBili  2.3<H>  /  DBili  x   /  AST  42<H>  /  ALT  22  /  AlkPhos  73  08-09    PT/INR - ( 11 Aug 2023 02:22 )   PT: 13.5 sec;   INR: 1.19          PTT - ( 10 Aug 2023 04:14 )  PTT:41.0 sec  Urinalysis Basic - ( 11 Aug 2023 02:22 )    Color: x / Appearance: x / SG: x / pH: x  Gluc: 172 mg/dL / Ketone: x  / Bili: x / Urobili: x   Blood: x / Protein: x / Nitrite: x   Leuk Esterase: x / RBC: x / WBC x   Sq Epi: x / Non Sq Epi: x / Bacteria: x        MICROBIOLOGY:  reviewed    RADIOLOGY & ADDITIONAL STUDIES:  Reviewed

## 2023-08-11 NOTE — PROGRESS NOTE ADULT - ASSESSMENT
Review of Studies     TELEMETRY:  A-fib/flutter began at 11am this morning, and than A-fib/flutter rhythm again at 1 pm, returned to NSR post amiodarone 	    ECG: previous A-fib/flutter episodes present on EKG readings from July 17, July 28, and August 10th      #A-fib w/ RVR / flutter - likely in the setting of septic shock/ metabolic derangements vasopressors   -multiple EKG this admission noted to have A-fib/flutter   -Pt had 3 episodes of A-fib/flutter with RVR, episodes were treated with 3 mg Metroprolol tartrate, 5mg Metroprolol tartrate, and 150 mg of amiodarone respectively.   -pt converted to NSR with amiodarone bolus   -A-fib/flutter most likely 2/2 sepsis will continue to monitor for now  -We do not recommend giving additional doses of amiodarone at this time, he is currently in NSR but has intermittent episodes of A-fib/flutter- due to secondary side effects on liver, thyroid (pts LFTs are already elevated) and interactions with current medications that the pt is already on   -A-fib/flutter does not need to be treated at this time, GOC outlined by palliative   -DNR in place, family are visiting and family may want more time to talk about withdrawing care once family has a chance to see him.  -would optimally be on heparin drip due to risk of stroke but will defer for now in the setting of thrombocytopenia and DIC

## 2023-08-12 NOTE — PROGRESS NOTE ADULT - PROVIDER SPECIALTY LIST ADULT
ENT
Epilepsy
Gastroenterology
Heme/Onc
Hospitalist
Infectious Disease
NSICU
Neurosurgery
Palliative Care
Cardiology
ENT
Gastroenterology
Hospitalist
Infectious Disease
NSICU
Nephrology
Neurosurgery
Rehab Medicine
Heme/Onc
Hospitalist
Hospitalist
Infectious Disease
NSICU
Nephrology
Neurosurgery
Palliative Care
Gastroenterology
Hospitalist
Infectious Disease
NSICU
Nephrology
Neurosurgery
Urology
ENT
Epilepsy
Hospitalist
Hospitalist
NSICU
Neurosurgery
Neurosurgery
Hospitalist
NSICU
Hospitalist
NSICU
Palliative Care

## 2023-08-12 NOTE — CHART NOTE - NSCHARTNOTESELECT_GEN_ALL_CORE
7-24-23/Event Note
7.18.23/Event Note
Event Note
Gastroenterology
Nutrition Services
Progress Note/Event Note
Stroke Code/Rapid Response/Event Note
7-10-23/Event Note
7-23-23/Event Note
Comfort care/Event Note
EGD
ENT/Event Note
Event Note
Follow Up/Nutrition Services
NSGY/Event Note
Neurosurgery/Event Note
Nutrition Services
Progress Note/Event Note
Rapid Response
Rapid Response/Event Note
anti-infective approval/Event Note
chart note/Event Note
chemo plan/Event Note
daily events/Event Note

## 2023-08-12 NOTE — CHART NOTE - NSCHARTNOTEFT_GEN_A_CORE
Per family decision, patient's GOC changed to comfort- based care only earlier this pm    Contacted by RN to notify MD of asystole on monitor.     Patient seen and examined.   Unresponsive to all verbal/ tactile stimuli  Pupils 5mm dilated and fixed  No heart sounds present  No breath sounds present    Date of death: 8/12/2023  Time of death: 23:09    Primary M.D. notified

## 2023-08-12 NOTE — DISCHARGE NOTE FOR THE EXPIRED PATIENT - HOSPITAL COURSE
69 YO male PMH T2DM, HLD, JAGDISH, tracheal stenosis s/p tracheostomy (s/p closure); glioblastoma s/p resection 2022, and [place] Avastin treatment 3/2023 adm with aphasia, N/V and severe hypoNa (118), imaging showing disesase progression, Hospital course complicated by sepsis secondary to bacteremia, decompensated, intubated, started on antibiotics and upgrated to ICU on . S/p trach with ENT (23). Now with recurrent septic shock and bacteremia, hypoxic respiratory failure, ARDS. Comfort care measures taken 23 and patient .

## 2023-08-12 NOTE — PROVIDER CONTACT NOTE (CRITICAL VALUE NOTIFICATION) - TEST AND RESULT REPORTED:
Hemoglobin 6.8  Hematocrit 20.1
Na 120
Potassium 2.8
ABG results, pH
Blood Culture result
Blood culture results
Ca 6.5
Griselda
Venous Gas, pH
Chemistry
Glucose & calcium
Platelets 20,000
Na 118
Positive blood culture
Hemoglobin: 7.0   Platelets: 11.0
Potassium 2.7
Potassium 2.6
reported during rapid response
Lactate 4.1

## 2023-08-12 NOTE — PROGRESS NOTE ADULT - SUBJECTIVE AND OBJECTIVE BOX
NSCU ATTENDING -- ADDITIONAL PROGRESS NOTE    Nighttime rounds were performed     69 y/o male with PMH of type 2 diabetes, hyperlipidemia, iron deficiency anemia, tracheal stenosis s/p tracheostomy, glioblastoma s/p resection 1/27/2022, and Avastin treatment 3/2023 presenting with altered mental status and weakness x2 days. Per family, LKN 6pm on 07/05 when he was at baseline. Patient has intermittent expressive aphasia but is getting worse and now is persistent, and has intermittent nausea and vomiting since 07/05. Patient was scheduled to have outpatient brain MRI on 7/25/2023.  Stroke code called in ED. Head CT shows new hyperdensity in frontal parietal - surgical site. MRI ordered to rule out tumor recurrence.   Per daughter at bedside, Entresto and Eliquis were stopped per his PC - cardiologist.  (07 Jul 2023 13:10)      ICU Vital Signs Last 24 Hrs  T(C): 37.1 (12 Aug 2023 17:00), Max: 37.7 (12 Aug 2023 01:25)  T(F): 98.8 (12 Aug 2023 17:00), Max: 99.9 (12 Aug 2023 05:02)  HR: 137 (12 Aug 2023 19:00) (91 - 137)  ABP: 72/43 (12 Aug 2023 19:00) (72/43 - 102/44)  ABP(mean): 53 (12 Aug 2023 19:00) (53 - 65)  RR: 12 (12 Aug 2023 19:00) (12 - 13)  SpO2: 93% (12 Aug 2023 19:00) (93% - 100%)      08-11-23 @ 07:01  -  08-12-23 @ 07:00  --------------------------------------------------------  IN: 4530.4 mL / OUT: 4210 mL / NET: 320.4 mL    08-12-23 @ 07:01  -  08-12-23 @ 19:18  --------------------------------------------------------  IN: 1461 mL / OUT: 750 mL / NET: 711 mL      Mode: AC/ CMV (Assist Control/ Continuous Mandatory Ventilation), RR (machine): 12, TV (machine): 410, FiO2: 50, PEEP: 8, ITime: 1, MAP: 11, PIP: 19      PHYSICAL EXAM:  General: Cachectic in appearance. Temporal wasting  Intubated, sedated   HEENT: Tracheostomy site C/D/I, NGT, MM dry  Neurological: Pupils 3mm and reactive with NPI 3  Pulmonary: Diminished throughout  Cardiovascular: S1, S2, RRR  Gastrointestinal: Soft, distended   : Campuzano catheter in place  Extremities: Upper extremity edema noted  Skin: Petechial rash diffusely over trunk and groin      MEDICATIONS:  acetaminophen     Tablet .. 650 milliGRAM(s) Oral every 6 hours PRN  acetylcysteine 10%  Inhalation 4 milliLiter(s) Inhalation every 6 hours  albuterol/ipratropium for Nebulization 3 milliLiter(s) Nebulizer every 6 hours  chlorhexidine 2% Cloths 1 Application(s) Topical <User Schedule>  chlorhexidine 4% Liquid 1 Application(s) Topical <User Schedule>  dexAMETHasone  Injectable 2 milliGRAM(s) IV Push every 12 hours  fentaNYL    Injectable 50 MICROGram(s) IV Push every 2 hours PRN  fentaNYL   Infusion. 0.5 MICROgram(s)/kG/Hr (3.63 mL/Hr) IV Continuous <Continuous>  lacosamide IVPB 50 milliGRAM(s) IV Intermittent every 12 hours  metoclopramide Injectable 10 milliGRAM(s) IV Push every 8 hours  norepinephrine Infusion 0.05 MICROgram(s)/kG/Min (3.4 mL/Hr) IV Continuous <Continuous>  pantoprazole  Injectable 40 milliGRAM(s) IV Push every 12 hours  petrolatum Ophthalmic Ointment 1 Application(s) Both EYES three times a day  phenylephrine    Infusion 0.2 MICROgram(s)/kG/Min (2.72 mL/Hr) IV Continuous <Continuous>  piperacillin/tazobactam IVPB.. 4.5 Gram(s) IV Intermittent every 8 hours  propofol Infusion 50 MICROgram(s)/kG/Min (21.8 mL/Hr) IV Continuous <Continuous>  sodium chloride 3 Gram(s) Oral every 6 hours  sodium chloride 0.9% lock flush 10 milliLiter(s) IV Push every 1 hour PRN  sodium chloride 0.9% lock flush 10 milliLiter(s) IV Push every 1 hour PRN  sodium chloride 3%  Inhalation 4 milliLiter(s) Inhalation every 6 hours  sucralfate suspension 1 Gram(s) Oral every 6 hours  vasopressin Infusion 0.04 Unit(s)/Min (6 mL/Hr) IV Continuous <Continuous>    LABS:                      7.0    12.69 )-----------( 11       ( 12 Aug 2023 05:06 )             21.7     08-12    142  |  112<H>  |  22  ----------------------------<  127<H>  3.1<L>   |  24  |  0.65    Ca    7.8<L>      12 Aug 2023 05:06  Phos  3.4     08-12  Mg     1.9     08-12    ABG - ( 11 Aug 2023 12:19 )  pH, Arterial: 7.41  pH, Blood: x     /  pCO2: 37    /  pO2: 81    / HCO3: 24    / Base Excess: -0.8  /  SaO2: 97.3        ASSESSMENT:   69 y/o M with septic shock secondary to Klebsiella bacteremia   Recurrent shock- likely septic secondary to PNA ?aspiration  Hypoxemic respiratory failure  ARDS  Bacteremia  GI bleed  GBM s/p resection, recent chemotherapy  History of Takotsubo cardiomyopathy  Chronic hyponatremia secondary to SIADH  Type 2 DM      PLAN  NEURO:  Pupil checks Q1  Analgesia: Fentanyl gtt  Sedation: Propofol RASS neg 5  Seizure history: Continue vimpat.  Cerebral edema: On dexamethasone 2mg Q12    PULM:   ARDS likely secondary to aspiration   On full support. Nimbex DCed    CARDIOVASCULAR: Hypotension in setting of likely septic shock (recurrent); bacteremia, Afib RVR  MAP goal 65-70  Pressors: Levophed, phenylephrine- weaning  TTE: EF 60-65%. No WMA, no vegetation    GASTROENTEROLOGY: Esophagitis and non-bleeding ulcer, r/o ileus.  GI ppx: PPI bid. Continue sucralfate  LBM: 8/7. Keep NPO for now. NGT to LIWS    RENAL/: Chronic hyponatremia secondary to SIADH, urine retention. Difficult catheterisation  Severe hypokalemia  Hyponatremia- improving. Hypertonics Dced. Salt tabs. Na goal 135-145  Campuzano catheter placed by Urology. Maintain. Monitor Is/Os    ENDOCRINE: Diabetes mellitus  A1c- 5.7. ISS while NPO. Monitor fingersticks.    HEME/ONC: Pancytopenia likely chemo-induced vs sepsis, bone marrow suppression, R/O hemolysis, coagulopathy, anemia  No further PRBC/ platelet transfusions.    INFECTIOUS: S/P Klebsiella bacteremia. Now with enterococcus faecalis, pseudomonas and clostridium perfringens bacteremia  On Zosyn.  ID following. No repeat cultures indicated per ID.    MISC:  Family meeting held on 8/10. Patient's family decided on DNR/DNI.   Deescalating care as of 8/12.  [] awaiting [x] updated daughter and son-in- law at bedside [] family meeting    Continue care per daytime intensivist Bello AMIN    Additional critical care time: 60 minutes

## 2023-08-12 NOTE — PROGRESS NOTE ADULT - SUBJECTIVE AND OBJECTIVE BOX
=================================  NEUROCRITICAL CARE ATTENDING NOTE  =================================    JORDAN CHAKRABORTY   MRN-[id number]  Summary:  68y/M  with type 2 diabetes, hyperlipidemia, iron deficiency anemia, tracheal stenosis s/p tracheostomy (which was closed by ENT 7 days ago); glioblastoma s/p resection 1/27/2022, and Avastin treatment 3/2023 presenting with altered mental status and weakness x2 days. Per family, they last saw him last night around 6pm when he was at baseline. Patient has intermittent expressive aphasia but is getting worse and now is persistent, and has intermittent nausea and vomiting since last night. Patient was supposed to receive an outpatient brain MRI on 7/25/2023. Stroke code performed in  is negative for CVA. Head CT shows new hyperdensity in frontal parietal - surgical site. MRI is ordered to rule out tumor recurrence.  Per daughter at bedside, Entresto and Eliquis were stopped per his PC - cardiologist.   (07 Jul 2023 13:10)    COURSE IN THE HOSPITAL:  07/07 Admitted to [facility], started on 3%  07/08 37.8 pancultured No significant events overnight.   07/09 No significant events overnight.    07/29 back to ICU for septic shock - Klebsiella  08/07 back on pressors overnight, CT overnight, 1/2 amp glucose given  08/08 Tmax 38.3 septic shock, bacteremic  08/09 Tmax 38.2 desaturation overnight, pressor requirements increased, stared on  austin / vasopressin; CXR ARDS, paralyzed with  nimbex, given Ca Gluc, 1 amp NaHCO3, given platelet (40s): INR 1.9, given 10 vit K, given tobra x1 dose, vanc extra dose;  08/10 Tmax 38.7 remains paralyzed, given tobra x1 yesterday; platelets down to 18, palliative GOC discussion - DNR   08/11 Tmax 37.8  08/12 Tmax 37.8    Past Medical History: No pertinent past medical history diabetes mellitus Hypertension  Glioblastoma  Type 2 diabetes mellitus  Hyperlipidemia  Iron deficiency anemia  Nephrolithiasis  Thrombocytopenia  Hyponatremia  BPH (benign prostatic hyperplasia)  Allergies:  amoxicillin (Rash)  Home meds:   ·	aspirin 81 mg oral tablet: 1 tab(s) orally once a day  ·	atorvastatin 20 mg oral tablet: 1 tab(s) orally once a day (at bedtime)  ·	escitalopram 5 mg oral tablet: 1 tab(s) orally once a day  ·	fluticasone 50 mcg/inh nasal spray: 1 spray(s) nasal 2 times a day  ·	Januvia 100 mg oral tablet: 1 tab(s) orally once a day  ·	levETIRAcetam 750 mg oral tablet: 1 tab(s) orally 2 times a day  ·	melatonin 3 mg oral tablet: 2 tab(s) orally once a day (at bedtime)  ·	ondansetron 4 mg oral tablet: 1 tab(s) orally once a day  ·	pantoprazole 40 mg oral delayed release tablet: 1 tab(s) orally once a day (before a meal)  ·	senna (sennosides) 8.6 mg oral tablet: 1 tab(s) orally once a day as needed for  constipation  ·	sodium chloride 1 g oral tablet: 2 tab(s) orally every 6 hours    PHYSICAL EXAMINATION  T(C): 37.7 (08-12 @ 05:02), Max: 37.8 (08-11 @ 09:09) HR: 98 (08-12 @ 07:00) (89 - 110) BP: -- RR: 12 (08-12 @ 07:00) (12 - 22) SpO2: 100% (08-12 @ 07:00) (92% - 100%)   NEUROLOGIC EXAMINATION:  Patient is paralyzed JUAN   GENERAL: 22 40 400 +8  EENT:  anicteric, trach site clean  CARDIOVASCULAR: (+) S1 S2, normal rate and regular rhythm  PULMONARY: clear to auscultation bilaterally  ABDOMEN: soft, nontender with normoactive bowel sounds  EXTREMITIES: no edema  SKIN: no rash    LABS: 08-12  CAPILLARY BLOOD GLUCOSE 108 153 143 134             (11.07)  7.0  (7.3)  12.69 )-----------( 11       ( 12 Aug 2023 05:06 )             21.7      142  |  112<H>  |  22  ----------------------------<  127<H>  3.1<L>   |  24  |  0.65    Ca    7.8<L>      12 Aug 2023 05:06  Phos  3.4     08-12  Mg     1.9     08-12 08-11 @ 07:01  -  08-12 @ 07:00  IN: 4505.4 mL / OUT: 4210 mL / NET: 295.4 mL     lactate 2.3 <-- 2.5    08-10 @ 07:01  -  08-11 @ 07:00  IN: 5132 mL / OUT: 2260 mL / NET: 2872 mL    PT/INR - ( 10 Aug 2023 04:14 )   PT: 19.3 sec;   INR: 1.72    PTT - ( 10 Aug 2023 04:14 )  PTT:41.0 sec  INR TREND:1.72 (08-10 @ 04:14)1.85 (08-09 @ 02:38)1.99 (08-08 @ 20:08) 1.27 (08-04 @ 02:39) 1.26 (07-29 @ 15:34)    ABG - ( 10 Aug 2023 04:24 ) pH, Arterial: 7.29  pH, Blood: x     /  pCO2: 39    /  pO2: 102   / HCO3: 19    / Base Excess: -7.3  /  SaO2: 99.1       Bacteriology:  08/08 blood CS NG2Dx2  08/08 sputum GS: numerous pseudomonas aeruginosa  08/08 Blood CS pseudomonas enterococcus clostridium perfringens  08/03 Blood CS NG5D x2   07/31 sputum rare to few Klebsiella    CSF studies:  EEG:  Neuroimaging:  Other imaging:    MEDICATIONS: 08-12    ·	piperacillin/tazobactam IVPB.. 4.5 IV Intermittent every 8 hours  ·	lacosamide IVPB 50 IV Intermittent every 12 hours  ·	metoclopramide Injectable 10 IV Push every 8 hours  ·	acetylcysteine 10%  Inhalation 4 Inhalation every 6 hours  ·	albuterol/ipratropium for Nebulization 3 Nebulizer every 6 hours  ·	sodium chloride 3%  Inhalation 4 Inhalation every 6 hours  ·	pantoprazole  Injectable 40 IV Push every 12 hours  ·	sucralfate suspension 1 Oral every 6 hours  ·	hydrocortisone sodium succinate Injectable 25 IV Push every 8 hours  ·	insulin lispro (ADMELOG) corrective regimen sliding scale  SubCutaneous every 6 hours  ·	petrolatum Ophthalmic Ointment 1 Both EYES three times a day  ·	potassium chloride  10 mEq/100 mL IVPB 10 IV Intermittent every 1 hour  ·	sodium chloride 3 Oral every 6 hours  ·	acetaminophen     Tablet .. 650 Oral every 6 hours PRN  ·	fentaNYL    Injectable 50 IV Push every 2 hours PRN    IV FLUIDS: OFF   DRIPS:  ·	norepinephrine Infusion 0.05 MICROgram(s)/kG/Min IV Continuous <Continuous> 0.06  ·	phenylephrine    Infusion 0.1 MICROgram(s)/kG/Min IV Continuous <Continuous> - 6.5  ·	vasopressin Infusion 0.04 IV Continuous <Continuous> 0.04  ·	cisatracurium Infusion 3 IV Continuous <Continuous> 3  ·	fentaNYL   Infusion. 1.0 IV Continuous <Continuous> 1.0  ·	propofol 40  DIET: OFF  Lines: R IJ L radial [place]  Drains:   Campuzano    Wounds:    CODE STATUS:  Full Code                       GOALS OF CARE:  aggressive                      DISPOSITION:  ICU =================================  NEUROCRITICAL CARE ATTENDING NOTE  =================================    JORDAN CHAKRABORTY   MRN-[id number]  Summary:  68y/M  with type 2 diabetes, hyperlipidemia, iron deficiency anemia, tracheal stenosis s/p tracheostomy (which was closed by ENT 7 days ago); glioblastoma s/p resection 1/27/2022, and Avastin treatment 3/2023 presenting with altered mental status and weakness x2 days. Per family, they last saw him last night around 6pm when he was at baseline. Patient has intermittent expressive aphasia but is getting worse and now is persistent, and has intermittent nausea and vomiting since last night. Patient was supposed to receive an outpatient brain MRI on 7/25/2023. Stroke code performed in  is negative for CVA. Head CT shows new hyperdensity in frontal parietal - surgical site. MRI is ordered to rule out tumor recurrence.  Per daughter at bedside, Entresto and Eliquis were stopped per his PC - cardiologist.   (07 Jul 2023 13:10)    COURSE IN THE HOSPITAL:  07/07 Admitted to [facility], started on 3%  07/08 37.8 pancultured No significant events overnight.   07/09 No significant events overnight.    07/29 back to ICU for septic shock - Klebsiella  08/07 back on pressors overnight, CT overnight, 1/2 amp glucose given  08/08 Tmax 38.3 septic shock, bacteremic  08/09 Tmax 38.2 desaturation overnight, pressor requirements increased, stared on  austin / vasopressin; CXR ARDS, paralyzed with  nimbex, given Ca Gluc, 1 amp NaHCO3, given platelet (40s): INR 1.9, given 10 vit K, given tobra x1 dose, vanc extra dose;  08/10 Tmax 38.7 remains paralyzed, given tobra x1 yesterday; platelets down to 18, palliative GOC discussion - DNR   08/11 Tmax 37.8  08/12 Tmax 37.8    Past Medical History: No pertinent past medical history diabetes mellitus Hypertension  Glioblastoma  Type 2 diabetes mellitus  Hyperlipidemia  Iron deficiency anemia  Nephrolithiasis  Thrombocytopenia  Hyponatremia  BPH (benign prostatic hyperplasia)  Allergies:  amoxicillin (Rash)  Home meds:   ·	aspirin 81 mg oral tablet: 1 tab(s) orally once a day  ·	atorvastatin 20 mg oral tablet: 1 tab(s) orally once a day (at bedtime)  ·	escitalopram 5 mg oral tablet: 1 tab(s) orally once a day  ·	fluticasone 50 mcg/inh nasal spray: 1 spray(s) nasal 2 times a day  ·	Januvia 100 mg oral tablet: 1 tab(s) orally once a day  ·	levETIRAcetam 750 mg oral tablet: 1 tab(s) orally 2 times a day  ·	melatonin 3 mg oral tablet: 2 tab(s) orally once a day (at bedtime)  ·	ondansetron 4 mg oral tablet: 1 tab(s) orally once a day  ·	pantoprazole 40 mg oral delayed release tablet: 1 tab(s) orally once a day (before a meal)  ·	senna (sennosides) 8.6 mg oral tablet: 1 tab(s) orally once a day as needed for  constipation  ·	sodium chloride 1 g oral tablet: 2 tab(s) orally every 6 hours    PHYSICAL EXAMINATION  T(C): 37.7 (08-12 @ 05:02), Max: 37.8 (08-11 @ 09:09) HR: 98 (08-12 @ 07:00) (89 - 110) BP: -- RR: 12 (08-12 @ 07:00) (12 - 22) SpO2: 100% (08-12 @ 07:00) (92% - 100%)   NEUROLOGIC EXAMINATION:  Patient is paralyzed JUAN sluggish bilaterally  GENERAL: 12 50 400 +8  EENT:  anicteric, trach site clean  CARDIOVASCULAR: (+) S1 S2, normal rate and regular rhythm  PULMONARY: rhonchi all LFs  ABDOMEN: soft, nontender with normoactive bowel sounds  EXTREMITIES: no edema  SKIN: no rash    LABS: 08-12  CAPILLARY BLOOD GLUCOSE 108 153 143 134             (11.07)  7.0  (7.3)  12.69 )-----------( 11       ( 12 Aug 2023 05:06 )             21.7      142  |  112<H>  |  22  ----------------------------<  127<H>  3.1<L>   |  24  |  0.65    Ca    7.8<L>      12 Aug 2023 05:06  Phos  3.4     08-12  Mg     1.9     08-12 08-11 @ 07:01  -  08-12 @ 07:00  IN: 4505.4 mL / OUT: 4210 mL / NET: 295.4 mL     lactate 2.3 <-- 2.5    08-10 @ 07:01  -  08-11 @ 07:00  IN: 5132 mL / OUT: 2260 mL / NET: 2872 mL    PT/INR - ( 10 Aug 2023 04:14 )   PT: 19.3 sec;   INR: 1.72    PTT - ( 10 Aug 2023 04:14 )  PTT:41.0 sec  INR TREND:1.72 (08-10 @ 04:14)1.85 (08-09 @ 02:38)1.99 (08-08 @ 20:08) 1.27 (08-04 @ 02:39) 1.26 (07-29 @ 15:34)    ABG - ( 10 Aug 2023 04:24 ) pH, Arterial: 7.29  pH, Blood: x     /  pCO2: 39    /  pO2: 102   / HCO3: 19    / Base Excess: -7.3  /  SaO2: 99.1       Bacteriology:  08/08 blood CS NG2Dx2  08/08 sputum GS: numerous pseudomonas aeruginosa  08/08 Blood CS pseudomonas enterococcus clostridium perfringens  08/03 Blood CS NG5D x2   07/31 sputum rare to few Klebsiella    CSF studies:  EEG:  Neuroimaging:  Other imaging:    MEDICATIONS: 08-12    ·	piperacillin/tazobactam IVPB.. 4.5 IV Intermittent every 8 hours  ·	lacosamide IVPB 50 IV Intermittent every 12 hours  ·	metoclopramide Injectable 10 IV Push every 8 hours  ·	acetylcysteine 10%  Inhalation 4 Inhalation every 6 hours  ·	albuterol/ipratropium for Nebulization 3 Nebulizer every 6 hours  ·	sodium chloride 3%  Inhalation 4 Inhalation every 6 hours  ·	pantoprazole  Injectable 40 IV Push every 12 hours  ·	sucralfate suspension 1 Oral every 6 hours  ·	hydrocortisone sodium succinate Injectable 25 IV Push every 8 hours  ·	insulin lispro (ADMELOG) corrective regimen sliding scale  SubCutaneous every 6 hours  ·	petrolatum Ophthalmic Ointment 1 Both EYES three times a day  ·	sodium chloride 3 Oral every 6 hours  ·	acetaminophen     Tablet .. 650 Oral every 6 hours PRN  ·	fentaNYL    Injectable 50 IV Push every 2 hours PRN    IV FLUIDS: OFF   DRIPS:  ·	norepinephrine Infusion 0.05 MICROgram(s)/kG/Min IV Continuous <Continuous> 0.08  ·	phenylephrine    Infusion 0.1 MICROgram(s)/kG/Min IV Continuous <Continuous> - 6.9  ·	vasopressin Infusion 0.04 IV Continuous <Continuous> 0.04  ·	cisatracurium Infusion 3 IV Continuous <Continuous> 3  ·	fentaNYL   Infusion. 1.0 IV Continuous <Continuous> 1.0  ·	propofol 40  DIET: OFF  Lines: R IJ L radial [place]  Drains:   Campuzano    Wounds:    CODE STATUS:  Full Code                       GOALS OF CARE:  aggressive                      DISPOSITION:  ICU =================================  NEUROCRITICAL CARE ATTENDING NOTE  =================================    JORDAN CHAKRABORTY   MRN-[id number]  Summary:  68y/M  with type 2 diabetes, hyperlipidemia, iron deficiency anemia, tracheal stenosis s/p tracheostomy (which was closed by ENT 7 days ago); glioblastoma s/p resection 1/27/2022, and Avastin treatment 3/2023 presenting with altered mental status and weakness x2 days. Per family, they last saw him last night around 6pm when he was at baseline. Patient has intermittent expressive aphasia but is getting worse and now is persistent, and has intermittent nausea and vomiting since last night. Patient was supposed to receive an outpatient brain MRI on 7/25/2023. Stroke code performed in  is negative for CVA. Head CT shows new hyperdensity in frontal parietal - surgical site. MRI is ordered to rule out tumor recurrence.  Per daughter at bedside, Entresto and Eliquis were stopped per his PC - cardiologist.   (07 Jul 2023 13:10)    COURSE IN THE HOSPITAL:  07/07 Admitted to [facility], started on 3%  07/08 37.8 pancultured No significant events overnight.   07/09 No significant events overnight.    07/29 back to ICU for septic shock - Klebsiella  08/07 back on pressors overnight, CT overnight, 1/2 amp glucose given  08/08 Tmax 38.3 septic shock, bacteremic  08/09 Tmax 38.2 desaturation overnight, pressor requirements increased, stared on  austin / vasopressin; CXR ARDS, paralyzed with  nimbex, given Ca Gluc, 1 amp NaHCO3, given platelet (40s): INR 1.9, given 10 vit K, given tobra x1 dose, vanc extra dose;  08/10 Tmax 38.7 remains paralyzed, given tobra x1 yesterday; platelets down to 18, palliative GOC discussion - DNR   08/11 Tmax 37.8  08/12 Tmax 37.8    Past Medical History: No pertinent past medical history diabetes mellitus Hypertension  Glioblastoma  Type 2 diabetes mellitus  Hyperlipidemia  Iron deficiency anemia  Nephrolithiasis  Thrombocytopenia  Hyponatremia  BPH (benign prostatic hyperplasia)  Allergies:  amoxicillin (Rash)  Home meds:   ·	aspirin 81 mg oral tablet: 1 tab(s) orally once a day  ·	atorvastatin 20 mg oral tablet: 1 tab(s) orally once a day (at bedtime)  ·	escitalopram 5 mg oral tablet: 1 tab(s) orally once a day  ·	fluticasone 50 mcg/inh nasal spray: 1 spray(s) nasal 2 times a day  ·	Januvia 100 mg oral tablet: 1 tab(s) orally once a day  ·	levETIRAcetam 750 mg oral tablet: 1 tab(s) orally 2 times a day  ·	melatonin 3 mg oral tablet: 2 tab(s) orally once a day (at bedtime)  ·	ondansetron 4 mg oral tablet: 1 tab(s) orally once a day  ·	pantoprazole 40 mg oral delayed release tablet: 1 tab(s) orally once a day (before a meal)  ·	senna (sennosides) 8.6 mg oral tablet: 1 tab(s) orally once a day as needed for  constipation  ·	sodium chloride 1 g oral tablet: 2 tab(s) orally every 6 hours    PHYSICAL EXAMINATION  T(C): 37.7 (08-12 @ 05:02), Max: 37.8 (08-11 @ 09:09) HR: 98 (08-12 @ 07:00) (89 - 110) BP: -- RR: 12 (08-12 @ 07:00) (12 - 22) SpO2: 100% (08-12 @ 07:00) (92% - 100%)   NEUROLOGIC EXAMINATION:  Patient is paralyzed JUAN sluggish bilaterally  GENERAL: 12 50 400 +8  EENT:  anicteric, trach site clean  CARDIOVASCULAR: (+) S1 S2, normal rate and regular rhythm  PULMONARY: rhonchi all LFs  ABDOMEN: soft, nontender with normoactive bowel sounds  EXTREMITIES: no edema  SKIN: no rash    LABS: 08-12  CAPILLARY BLOOD GLUCOSE 108 153 143 134             (11.07)  7.0  (7.3)  12.69 )-----------( 11       ( 12 Aug 2023 05:06 )             21.7      142  |  112<H>  |  22  ----------------------------<  127<H>  3.1<L>   |  24  |  0.65    Ca    7.8<L>      12 Aug 2023 05:06  Phos  3.4     08-12  Mg     1.9     08-12 08-11 @ 07:01  -  08-12 @ 07:00  IN: 4505.4 mL / OUT: 4210 mL / NET: 295.4 mL     lactate 2.3 <-- 2.5    08-10 @ 07:01  -  08-11 @ 07:00  IN: 5132 mL / OUT: 2260 mL / NET: 2872 mL    PT/INR - ( 10 Aug 2023 04:14 )   PT: 19.3 sec;   INR: 1.72    PTT - ( 10 Aug 2023 04:14 )  PTT:41.0 sec  INR TREND:1.72 (08-10 @ 04:14)1.85 (08-09 @ 02:38)1.99 (08-08 @ 20:08) 1.27 (08-04 @ 02:39) 1.26 (07-29 @ 15:34)    ABG - ( 10 Aug 2023 04:24 ) pH, Arterial: 7.29  pH, Blood: x     /  pCO2: 39    /  pO2: 102   / HCO3: 19    / Base Excess: -7.3  /  SaO2: 99.1       Bacteriology:  08/08 blood CS NG2Dx2  08/08 sputum GS: numerous pseudomonas aeruginosa  08/08 Blood CS pseudomonas enterococcus clostridium perfringens  08/03 Blood CS NG5D x2   07/31 sputum rare to few Klebsiella    CSF studies:  EEG:  Neuroimaging:  Other imaging:    MEDICATIONS: 08-12    ·	piperacillin/tazobactam IVPB.. 4.5 IV Intermittent every 8 hours  ·	lacosamide IVPB 50 IV Intermittent every 12 hours  ·	metoclopramide Injectable 10 IV Push every 8 hours  ·	acetylcysteine 10%  Inhalation 4 Inhalation every 6 hours  ·	albuterol/ipratropium for Nebulization 3 Nebulizer every 6 hours  ·	sodium chloride 3%  Inhalation 4 Inhalation every 6 hours  ·	pantoprazole  Injectable 40 IV Push every 12 hours  ·	sucralfate suspension 1 Oral every 6 hours  ·	hydrocortisone sodium succinate Injectable 25 IV Push every 8 hours  ·	insulin lispro (ADMELOG) corrective regimen sliding scale  SubCutaneous every 6 hours  ·	petrolatum Ophthalmic Ointment 1 Both EYES three times a day  ·	sodium chloride 3 Oral every 6 hours  ·	acetaminophen     Tablet .. 650 Oral every 6 hours PRN  ·	fentaNYL    Injectable 50 IV Push every 2 hours PRN    IV FLUIDS: OFF   DRIPS:  ·	norepinephrine Infusion 0.05 MICROgram(s)/kG/Min IV Continuous <Continuous> 0.08  ·	phenylephrine    Infusion 0.1 MICROgram(s)/kG/Min IV Continuous <Continuous> - 6.9  ·	vasopressin Infusion 0.04 IV Continuous <Continuous> 0.04  ·	cisatracurium Infusion 3 IV Continuous <Continuous> 3  ·	fentaNYL   Infusion. 1.0 IV Continuous <Continuous> 1.0  ·	propofol 40  DIET: OFF  Lines: R IJ L radial [place]  Drains:   Campuzano    Wounds:    CODE STATUS:  DNR                    GOALS OF CARE:  no escalation DISPOSITION:  ICU

## 2023-08-12 NOTE — PROVIDER CONTACT NOTE (CRITICAL VALUE NOTIFICATION) - NS PROVIDER READ BACK TO LAB
yes
reported to team during rapid response/yes
yes

## 2023-08-12 NOTE — PROVIDER CONTACT NOTE (CRITICAL VALUE NOTIFICATION) - PERSON GIVING RESULT:
Chemistry
Chemistry
Lab
Lab
Nathalia/Chemistry
CHEM
Blood Gads Lab
Blood Gas Lab
Hematology
Microbiology
Microbiology
Chemistry
Chemistry
Hematology
Microbiology
POCT blood glu RN
Lab
VARUN Raymundo/Nicolasa
Chemistry
chemistry

## 2023-08-12 NOTE — PROGRESS NOTE ADULT - CRITICAL CARE SERVICES PROVIDED
Patient is critically ill, requiring critical care services.

## 2023-08-12 NOTE — DISCHARGE NOTE FOR THE EXPIRED PATIENT - NS PATIENT DEATH CRITERIA
cardiopulmonary arrest secondary to sepsis/Patient is dead based on Cardiopulmonary criteria including absent breath sounds, pulselessness and/or asystole

## 2023-08-12 NOTE — PROGRESS NOTE ADULT - REASON FOR ADMISSION
Altered Mental Status & Weakness x2 days

## 2023-08-12 NOTE — PROVIDER CONTACT NOTE (CRITICAL VALUE NOTIFICATION) - RECOMMENDATIONS
Glucose 38, calcium 6.3
PA at bedside
PA to assess
bolus if appropriate.
Neuro team PA Guanako Chino notified, says she will order electrolytes
Rapid response called, neuro team and providers at bedside
pt to be assessed by team and given dextrose

## 2023-08-12 NOTE — PROGRESS NOTE ADULT - ASSESSMENT
68y/M with  severe hyponatremia  GBM brain compression cerebral edema   Diabetes Mellitus Hypertension dyslipidemia  iron deficiency anemia  nephrolithiasis  thrombocytopenia  BPH    PLAN:   NEURO: comachecks q4h, VS q1h, fentanyl and propfol drip paralyzed, RASS goal -3 to -5  GBM - no neurosurgical plans, palliative care, continue steroids  REHAB:  physical therapy evaluation and management    EARLY MOB:   HOB up bed rest    PULM:  continue full vent support, cont nebs, FiO2 to 40%, PEEP to 5, ABG   CARDIO:  MAP 65-70, taper levo austin, continue vasopressin   ENDO:  Blood sugar goals 140-180 mg/dL, continue insulin sliding scale  GI:  GI Bleed - cont BID PPI, sucralfate  DIET: start TPN   RENAL: Na goal 135-145, repeat BMP this afternoon, 3% @30, lasix 20mg IV x1  HEM/ONC: d/c Vit K, transfuse pRBC if < 7  VTE Prophylaxis: SCDs, no DVT chemoprophylaxis for now as patient is high risk for bleed (thrombocytopenia requiring platelet transfusion)  ID: severe septic shock pulmo (pseudomonas, enterococcus and acinetobacter) cont piperacillin/tazobactam, vanc trough, given 1 dose of tobramycin; ID on board, blood CS until culture NEG; f/u sensitivities of 3 bacteria  Social:     Active issues:  What's keeping patient in the ICU? [facility] vent  What is this patient's dispo plan?     ATTENDING ATTESTATION:  I was physically present for the key portions of the evaluation and management (E/M) service provided.  I agree with the above history, physical and plan, which I have reviewed and edited where appropriate.    Patient at high risk for neurological deterioration or death due to:  ICU delirium, aspiration PNA, DVT / PE.  Critical care time:  I have personally provided 60 minutes of critical care time, excluding time spent on separate procedures.      Plan discussed with RN, house staff. 68y/M with  severe hyponatremia  GBM brain compression cerebral edema   Diabetes Mellitus Hypertension dyslipidemia  iron deficiency anemia  nephrolithiasis  thrombocytopenia  BPH    PLAN:   NEURO: comachecks q4h, VS q1h, fentanyl and propfol drip paralyzed, RASS goal -3 to -5  GBM - no neurosurgical plans, palliative care, continue steroids  REHAB:  physical therapy evaluation and management    EARLY MOB:   HOB up bed rest    PULM:  continue full vent support, cont nebs, FiO2 to 40%, PEEP to 5, taper nimbex  CARDIO:  MAP 65-70, taper levo austin, continue vasopressin   ENDO:  Blood sugar goals 140-180 mg/dL, d/c insulin sliding scale  GI:  GI Bleed - cont BID PPI, sucralfate  DIET: NPO   RENAL: Na goal 135-145  HEM/ONC: Hb low  VTE Prophylaxis: SCDs, no DVT chemoprophylaxis for now as patient is high risk for bleed (thrombocytopenia requiring platelet transfusion)  ID: severe septic shock pulmo (pseudomonas, enterococcus and acinetobacter) cont piperacillin/tazobactam, vanc trough, given 1 dose of tobramycin; ID on board, blood CS until culture NEG; f/u sensitivities of 3 bacteria  Social: family discussion today    Active issues:  What's keeping patient in the ICU? [facility] vent  What is this patient's dispo plan?     ATTENDING ATTESTATION:  I was physically present for the key portions of the evaluation and management (E/M) service provided.  I agree with the above history, physical and plan, which I have reviewed and edited where appropriate.    Patient at high risk for neurological deterioration or death due to:  ICU delirium, aspiration PNA, DVT / PE.  Critical care time:  I have personally provided 60 minutes of critical care time, excluding time spent on separate procedures.      Plan discussed with RN, house staff. 68y/M with  severe hyponatremia  GBM brain compression cerebral edema   Diabetes Mellitus Hypertension dyslipidemia  iron deficiency anemia  nephrolithiasis  thrombocytopenia  BPH    PLAN:   NEURO: comachecks q4h, VS q1h, fentanyl and propfol drip paralyzed, RASS goal -3 to -5  GBM - no neurosurgical plans, palliative care, continue steroids  REHAB:  physical therapy evaluation and management    EARLY MOB:   HOB up bed rest    PULM:  continue full vent support, cont nebs, FiO2 to 40%, PEEP to 5, taper nimbex  CARDIO:  MAP 65-70, taper levo austin, continue vasopressin   ENDO:  Blood sugar goals 140-180 mg/dL, d/c insulin sliding scale  GI:  GI Bleed - cont BID PPI, sucralfate  DIET: NPO   RENAL: Na goal 135-145  HEM/ONC: Hb low  VTE Prophylaxis: SCDs, no DVT chemoprophylaxis for now as patient is high risk for bleed (thrombocytopenia requiring platelet transfusion)  ID: severe septic shock pulmo (pseudomonas, enterococcus and acinetobacter) cont piperacillin/tazobactam, vanc trough, given 1 dose of tobramycin; ID on board, blood CS until culture NEG; f/u sensitivities of 3 bacteria  Social: family discussion today    Active issues:  What's keeping patient in the ICU? [facility] vent  What is this patient's dispo plan?     ATTENDING ATTESTATION:  I was physically present for the key portions of the evaluation and management (E/M) service provided.  I agree with the above history, physical and plan, which I have reviewed and edited where appropriate.    Patient at high risk for neurological deterioration or death due to:  ICU delirium, aspiration PNA, DVT / PE.  Critical care time:  I have personally provided 60 minutes of critical care time, excluding time spent on separate procedures.      Plan discussed with RN, house staff.      ADDENDUM:  Discussed plan with family who agreed to no escalation of care, and to taper pressors to OFF.

## 2023-08-12 NOTE — CHART NOTE - NSCHARTNOTEFT_GEN_A_CORE
HypoK, given IV repletion. Hydrocort d/c'd, restarted decadron 2 BID. ISS d/c'd. Titrating down on nimbex. deescalation of care, DNR

## 2023-08-12 NOTE — PROVIDER CONTACT NOTE (CRITICAL VALUE NOTIFICATION) - ACTION/TREATMENT ORDERED:
patient off to urgent CT head, on tele monitor
0.9  cc bolus ordered.
No interventions ordered. Will continue to monitor.
Per provider order, will give 3 runs of 10 mEq K
SERGIO Chnio made aware of critical values
SERGIO Chino made aware of result
Awaiting further interventions
Repeat BMP
To Admin Fluid Bolus per MD order
RN awaiting interventions
PRBC ordered for patient
pt given 1 amp D50 POCT blood glu rechecked 242 at 0647. pt transferred to 8E at 0620.
Antibiotic regimen assessed
Reassess and adjust ventilator settings. Repeat blood gas after vent settings adjusted
SERGIO Ahmadi made aware
reassess and change ventilator settings and repeat ABG an hour after changing vent settings

## 2023-08-12 NOTE — DISCHARGE NOTE FOR THE EXPIRED PATIENT - SECONDARY DIAGNOSIS.
BPH (benign prostatic hyperplasia) Tracheal stenosis Brain mass Acute sepsis Type 2 diabetes mellitus Iron deficiency anemia Hypertension Hyperlipidemia Thrombocytopenia Glioblastoma

## 2023-08-12 NOTE — CHART NOTE - NSCHARTNOTEFT_GEN_A_CORE
Family (daughter Roxanne, patient's wife, son, daughter) requested to speak with MD. They would like to pursue full comfort care measures at this time.     Discontinue all vasopressors.   Increase fentanyl gtt  MOLST    Goal: Comfort care Family (daughter Roxanne, patient's wife, son, daughter) requested to speak with MD. They would like to pursue full comfort care measures at this time.     Discontinue all vasopressors.   Increase fentanyl gtt  MOLST completed    Goal: Comfort care

## 2023-08-12 NOTE — PROGRESS NOTE ADULT - SUBJECTIVE AND OBJECTIVE BOX
HPI:  Osiel Norwood is a 69YO male with a past medical history of type 2 diabetes, hyperlipidemia, iron deficiency anemia, tracheal stenosis s/p tracheostomy (which was closed by ENT 7 days ago); glioblastoma s/p resection 1/27/2022, and Avastin treatment 3/2023 presenting with altered mental status and weakness x2 days. Per family, they last saw him last night around 6pm when he was at baseline. Patient has intermittent expressive aphasia but is getting worse and now is persistent, and has intermittent nausea and vomiting since last night. Patient was supposed to receive an outpatient brain MRI on 7/25/2023.  Stroke code performed in  is negative for CVA. Head CT shows new hyperdensity in frontal parietal - surgical site. MRI is ordered to rule out tumor recurrence.   Per daughter at bedside, Entresto and Eliquis were stopped per his PC - cardiologist.      (07 Jul 2023 13:10)    INTERVAL EVENTS:  Intubated, sedated, on 2 pressors and paralytic    HOSPITAL COURSE:  8/1: Tachycardic, gave 20 IV lasix. Hgb at the time stable. NS@20 for renal protection. lantus increased to 14u. Lactate normalized. Given 500cc bolus for soft pressures, restarted levo gtt temporarily, now off. Endoscopy done with GI showed severe circumferential esophagitis, small ulcer to anterior stomach. propofol changed to precedex.   8/2: ANA LILIA overnight, remains sedated on precedex and intubated on ACVC.  Pt tolerating CPAP. Campuzano removed, failed TOV, and straight catheterized. Trickle feeds started 10cc/hr. Changed protonix gtt transitioned to 40mg IV BID per GI. ENT consulted for trach planning.  8/3: Remains on levo. Campuzano replaced by urology due to difficulty straight catheterizing. TF held for high residuals. 1u PRBC transfused. 250cc albumin and 500cc NS bolus. Plan for trach with ENT 8/4.  8/4: ANA LILIA overnight. Neuro exam stable. Off levo since 12am. POD 0 trach with ENT. Increased Lantus to 20u 2/2 high FS. +RT 2/2 multiple loose stools. Nephrology consulted 2/2 urine appearance, NTD from their standpoint. S/p 2u platelets and 1u PRBCs.  8/5: High residuals of TF, reglan 10IV q8h started. Neuro exam stable. Duonebs, 3% inhalation, mucomyst standing for secretions. Ordered repeat hemolytic studies. DC'd cental line. Holding TF for high residuals. ABD XR w/ stool burden. SBP in 100s, MAP in the low 60s. Given 250cc of albumin, started midodrine 15q8. Given 500c bolus. RT d/c'd.  8/6: ANA LILIA o/n. Metamucil added for diarhea. Tube feeds remain paused for high residuals. Metamucil d/c'd. Trickle feeds resumed.  Platelet antibody test negative. CTH stable. POCUS w/ b-lines. Given 10 IV lasix. s/p albumin 250 cc for hypotension.   8/7: Precedex started for increased alertness and tachypnea. Levo gtt restarted for MAP goal > 65. D/c lexapro. Florinef 0.2mg QD, Na goal 135-145. Advanced TF today, assess for intolerance. Goal to taper levo to off. EEG placed, slowing left hemispheric region no seizures.changed lantus to 10   8/8: switched to full vent support for tachypnea, spiked a fever, pancultured. Start 3% @30cc/hr. Dc'd eeg, negative for seizures. Holding TF 2/2 high residual and poss aspiration, increased NS to maintenace dose. Started reglan 10q8 for abdominal distention. Pressor requirements increased heavily, got 2L bolus wide open. Started Vanc/zosyn empirically. CXR complete. Levo and austin gtt. Ashford sump placed for GI decompresion and emesis, abd XR showing possible ileus vs SBO. Dc'd lantus. Given aother 1L bolus.   8/9: Pt requiring 3 vaspressors around 8pm MAPs in 40s. Pt noted to be desaturating to 86 despite FiO2 100%, given total 15mg nimbex. ABG with P/F ratio 61 and chest xray consistent with ARDS. Nimbex drip, fentanyl drip, and propofol drip started. FiO2 decreased to 60. Per ID Dr. Laughlin, recommended to give tobramycin x1 dose, extra dose vanc 500mg and increase maintenance to 1.5g bid. Blood cultures growing gram positive rods and gram positive cocci in chains and pairs. INR elevated 1.9, given vitamin k 10mg and plan to continue 5mg daily for 3 days. Persistent respiratory acidosis, given 1amp bicarb. Hypocalcemia, given 1g calcium gluconate given. Increased vitamin k to 10mg x 3 days. Changed levo to double strength. Got 1 unit platelets. Per ID DC'd vanc, given 460mg Tobramycin 2/2 level <2.   8/10: 1 dose tobramycin given at 10pm. Remains on imbex, propofol and fentanyl drips. Serum sodium stable. Neuro exam unchanged. Stess dose steroids 2/2 sepsis hydrocort [id number]. Went into rapid a.fib, given 3 lopressor. Rapid a. fib again, given 5 lopressor, cards consulted. Given 1u platelets. Heme recs if no bleeding, plt threshold 10 for transfusion. Pressor switched from levo to austin gtt. ID rec continue Zosyn, no need for Tobramycin based on sensitivities. family meeting w/ palliative- now DNR.   8/11: O/n Na 143, 3% stopped, florinef d/c'd. Cont strict NPO for ileus, NGT to [facility]. K repletion for hypoK, f/u repeat BMP. Cont ongoing GOC discussion. Remains intubated on full vent support, drips: prop, fent, levo, austin, nimbex. Given 20 mg IV lasix. Acidosis resolved. No escalation of care.   8/12: HypoK, given IV repletion.       Vital Signs Last 24 Hrs  T(C): 37.7 (12 Aug 2023 01:25), Max: 37.8 (11 Aug 2023 09:09)  T(F): 99.8 (12 Aug 2023 01:25), Max: 100 (11 Aug 2023 09:09)  HR: 103 (12 Aug 2023 01:00) (86 - 110)  BP: --  BP(mean): --  RR: 12 (12 Aug 2023 02:00) (12 - 23)  SpO2: 99% (12 Aug 2023 01:00) (92% - 100%)    Parameters below as of 12 Aug 2023 02:00  Patient On (Oxygen Delivery Method): ventilator    O2 Concentration (%): 60    I&O's Summary    10 Aug 2023 07:01  -  11 Aug 2023 07:00  --------------------------------------------------------  IN: 5132 mL / OUT: 2310 mL / NET: 2822 mL    11 Aug 2023 07:01  -  12 Aug 2023 01:50  --------------------------------------------------------  IN: 3337.4 mL / OUT: 3725 mL / NET: -387.6 mL        PHYSICAL EXAM:  General: Trach to vent, paralyzed on nimbex, eyes open   HEENT: PERRL 3mm (via pupillometer)   Cardiovascular: RRR, normal S1 and S2   Respiratory: non-labored breathing, symmetric chest rise, on trach to full vent  GI: abd soft, ND   Neuro: A&O x 0, not following commands, limited motor exam as patient is currently paralyzed on nimbex  Wounds: tracheostomy site dressing dry, sutures in place    LABS:                        7.3    11.07 )-----------( 17       ( 11 Aug 2023 02:22 )             22.5     08-11    142  |  111<H>  |  20  ----------------------------<  166<H>  2.8<LL>   |  24  |  0.58    Ca    7.8<L>      11 Aug 2023 20:04  Phos  3.3     08-11  Mg     1.5     08-11      PT/INR - ( 11 Aug 2023 02:22 )   PT: 13.5 sec;   INR: 1.19          PTT - ( 10 Aug 2023 04:14 )  PTT:41.0 sec  Urinalysis Basic - ( 11 Aug 2023 20:04 )    Color: x / Appearance: x / SG: x / pH: x  Gluc: 166 mg/dL / Ketone: x  / Bili: x / Urobili: x   Blood: x / Protein: x / Nitrite: x   Leuk Esterase: x / RBC: x / WBC x   Sq Epi: x / Non Sq Epi: x / Bacteria: x          CAPILLARY BLOOD GLUCOSE      POCT Blood Glucose.: 153 mg/dL (11 Aug 2023 23:47)  POCT Blood Glucose.: 143 mg/dL (11 Aug 2023 16:49)  POCT Blood Glucose.: 134 mg/dL (11 Aug 2023 10:41)  POCT Blood Glucose.: 158 mg/dL (11 Aug 2023 05:13)      Drug Levels: [] N/A    CSF Analysis: [] N/A      Allergies    amoxicillin (Rash)  ure-Na (Rash; Fever; Hypotension)    Intolerances      MEDICATIONS:  Antibiotics:  piperacillin/tazobactam IVPB.. 4.5 Gram(s) IV Intermittent every 8 hours    Neuro:  acetaminophen     Tablet .. 650 milliGRAM(s) Oral every 6 hours PRN  cisatracurium Infusion 3 MICROgram(s)/kG/Min IV Continuous <Continuous>  fentaNYL    Injectable 50 MICROGram(s) IV Push every 2 hours PRN  fentaNYL   Infusion. 0.5 MICROgram(s)/kG/Hr IV Continuous <Continuous>  lacosamide IVPB 50 milliGRAM(s) IV Intermittent every 12 hours  metoclopramide Injectable 10 milliGRAM(s) IV Push every 8 hours  propofol Infusion 50 MICROgram(s)/kG/Min IV Continuous <Continuous>    Anticoagulation:    OTHER:  acetylcysteine 10%  Inhalation 4 milliLiter(s) Inhalation every 6 hours  albuterol/ipratropium for Nebulization 3 milliLiter(s) Nebulizer every 6 hours  chlorhexidine 2% Cloths 1 Application(s) Topical <User Schedule>  chlorhexidine 4% Liquid 1 Application(s) Topical <User Schedule>  hydrocortisone sodium succinate Injectable 25 milliGRAM(s) IV Push every 8 hours  insulin lispro (ADMELOG) corrective regimen sliding scale   SubCutaneous every 6 hours  norepinephrine Infusion 0.05 MICROgram(s)/kG/Min IV Continuous <Continuous>  pantoprazole  Injectable 40 milliGRAM(s) IV Push every 12 hours  petrolatum Ophthalmic Ointment 1 Application(s) Both EYES three times a day  phenylephrine    Infusion 0.2 MICROgram(s)/kG/Min IV Continuous <Continuous>  sodium chloride 3%  Inhalation 4 milliLiter(s) Inhalation every 6 hours  sucralfate suspension 1 Gram(s) Oral every 6 hours  vasopressin Infusion 0.04 Unit(s)/Min IV Continuous <Continuous>    IVF:  sodium chloride 3 Gram(s) Oral every 6 hours    CULTURES:  Culture Results:   No growth at 2 days. (08-08 @ 20:53)  Culture Results:   No growth at 2 days. (08-08 @ 20:53)    RADIOLOGY & ADDITIONAL TESTS:      ASSESSMENT:  67 YO male PMH T2DM, HLD, JAGDISH, tracheal stenosis s/p tracheostomy (s/p closure); glioblastoma s/p resection 1/27/2022, and [place] Avastin treatment 3/2023 adm with aphasia, N/V and severe hypoNa (118), imaging showing disesase progression, Hospital course complicated by sepsis secondary to bacteremia, decompensated, intubated, started on antibiotics and upgrated to ICU on 7/29. S/p trach with ENT (8/4/23). Now with recurrent septic shock and bacteremia, hypoxic respiratory failure, ARDS    Neuro:  - pupil checks q4/vitals q1hrs   - CTH 7/7: Interval development of a 13 mm hyperdense nodule along the inferior margin of the resection cavity which may represent progression of disease with hemorrhage. Repeat CTH stable, CTH 7/17 stable   - CTA 7/7: negative, CTA 7/17 stable  - CTP 7/17 stable.   - fentanyl drip, propofol drip for RASS -3 to -5, nimbex drip train 2/4  - vimpat 50mg BID (switched from keppra 2/2 thrombocytopenia) for seizure tx  - Hydrocortisone 25 q 8 s/p loading  - c/w home Lexapro 5mg (home dose 5mg)  - home ASA 81 held i/s/o thrombocytopenia   - MRI brain 7/9 suspicious for recurrent tumor and hydro   - eeg dc'd 8/8, negative for seizures    Pulm:  - Trach (6 Shiley) to full vent support 40/400/12/5  - duonebs/mucomyst/3% inhalation q6    Cardio:  - MAP goal >65, austin gtt, vasopressin gtt  - A. fib w/ RVR 8/10  - 4/17 outpatient echo EF 65%  - echo 7/31: 60-65%, 8/9: EF 62%,  - 8/8 QTc 503    GI:  - Ashford sump to LIWS for ileus, NPO   - protonix BID and carafate 1g q6h for UGI bleed   - GI following, s/p EGD 8/1: severe circumferential esophagitis, small ulcer to anterior stomach  - reglan 10 IV q8h for GI dysmotility    Renal/:  - NaCl tabs 3g q6  - Campuzano replaced 8/3 by urology for difficult straight cath    Endo:  - A1c 5.7  - ISS  - h/o T2DM: home Januvia held    Heme:  - SCDs for DVT ppx, SQL and Aspirin 81 held for thrombocytopenia   - INR 1.9 elevated, trend. Vit k 10mg x3d  - heme consulted for thrombocytopenia- w/u sent, likely chronic thrombocytopenia d/t chemotherapy agent in past   - Per heme transfuse 1U plt if bleeding and platelet count <50k, if febrile and platelet count <20k. Hold transfusing platelets until tomorrow before procedure.   - 1u PRBC, 1U PLT 7/29, 2u PRBC, 1u PLT 7/30, 1u PRBC 8/3, 2u PLT 8/4, 1u PRBC 8/4, 1u PLT 8/9  - LE dopplers 7/8 negative  - LUE doppler 7/29: L superficial cephalic vein thrombophlebitis  - CTA PE protocol for tachycardia: negative     ID:  - 7/28, bcx growing K. pneumoniae, sputum cx K. pneumoniae  - 8/8 blood cx growing pseudomonas, enterococcus and clostridium  - meropenem 1g q8hrs (7/28-7/31), changed to Ceftriaxone/Flagyl per ID recs (7/31-8/5)  - CT CAP 7/31: Wall thickening in the colon suggesting an infectious or inflammatory colitis, Suspected small duodenal ulcer with surrounding edema in, Distended gallbladder w/o cholecystitis. Mild ascites. Bibasilar pneumonia L>R.  - Febrile, leukocytosis and tachycardia with elevated lactic acid on 7/17  - vancomycin/meropenem empirically (7/17-19), d/c per ID and repeat blood cx 7/19, NGTD  - Started Zosyn started empirically (8/8-)    Dispo:   - NSICU status, DNR (ongoing GOC discussion with family and palliative team)     D/w Dr. Dorado and Dr. Hendrix

## 2023-08-12 NOTE — PROVIDER CONTACT NOTE (CRITICAL VALUE NOTIFICATION) - SITUATION
Pt is hyponatremic
Hemoglobin: 7.0   Platelets: 11.0
Patient labs came back with critical value of potassium 2.7
Pt POCT glu 37 during rapid response.
Lactate 4.1
ABG pH 7.20
Aerobic blood culture from 8/8 positive for cocci with pairs and chains
Gram negative rods, Enterococcus and Pseudomonas in Aerobic blood culture from 8/8
Potassium 2.8 on am labs, 6 runs of 10mEq potassium repleted this am  post repletion labs K was 2.6.
Pt Ca+2 level is 6.2
pH, Love 7.18
Pt serum potassium of 2.8, up from previous result of 2.6
patient complains of severe headache, SOB, wheezing, rapid response and code stroke called, stat lab w lactate 8.5

## 2023-08-12 NOTE — PROGRESS NOTE ADULT - NUTRITIONAL ASSESSMENT
68M h/o GBM (s/p resection 1/2022, Avastin 3/2023), tracheal stenosis (s/p prior trach) p/w symptomatic hyponatremia with AMS (improved), ccb shock, K. pneumo bacteremia, and c/f GI bleed.    He has been septic and it is unclear if there was also a hemorrhagic component to his shock but reassuringly he is improving. He does have a significant Hgb drop with possibly an episode of GIB (but no evidence of sustained bleeding) however also no evidence of hemolysis or DIC, thus if no obvious explanations for bleeding on CT will plan for EGD as next step.    Recommendations:  - F/u final CT read  - If no alternate explanation for bleeding seen on CT, please make NPO at midnight and will plan for EGD tomorrow AM  - Large-bore IV x 2 and active T&S  - Continue IV Protonix gtt  - If pt has UGIB overnight, please give erythromycin 250 mg IV or metoclopramide IV 10 mg 2 hrs prior to EGD if no contraindications    We will continue to follow. Please see attending addendum for final recommendations.     Vaughn (Terri Leija MD  Gastroenterology Fellow  Pager (M-F 7a-5p): [phone]  Pager (after hours): Please call  for on-call fellow
This patient has been assessed with a concern for Malnutrition and has been determined to have a diagnosis/diagnoses of Moderate protein-calorie malnutrition.    This patient is being managed with:   Diet Consistent Carbohydrate w/Evening Snack-  Entered: Jul 7 2023  5:36PM  
This patient has been assessed with a concern for Malnutrition and has been determined to have a diagnosis/diagnoses of Moderate protein-calorie malnutrition.    This patient is being managed with:   Diet Consistent Carbohydrate w/Evening Snack-  Entered: Jul 7 2023  5:36PM  
This patient has been assessed with a concern for Malnutrition and has been determined to have a diagnosis/diagnoses of Moderate protein-calorie malnutrition.    This patient is being managed with:   Diet Consistent Carbohydrate w/Evening Snack-  Supplement Feeding Modality:  Oral  Glucerna Shake Cans or Servings Per Day:  1       Frequency:  Three Times a day  Entered: Jul 23 2023  2:46PM  
This patient has been assessed with a concern for Malnutrition and has been determined to have a diagnosis/diagnoses of Moderate protein-calorie malnutrition.    This patient is being managed with:   Diet NPO-  Entered: Aug  9 2023  8:58AM  
This patient has been assessed with a concern for Malnutrition and has been determined to have a diagnosis/diagnoses of Moderate protein-calorie malnutrition.    This patient is being managed with:   Diet Consistent Carbohydrate w/Evening Snack-  Entered: Jul 19 2023  4:27PM  
This patient has been assessed with a concern for Malnutrition and has been determined to have a diagnosis/diagnoses of Moderate protein-calorie malnutrition.    This patient is being managed with:   Diet Consistent Carbohydrate w/Evening Snack-  Entered: Jul 19 2023  4:27PM  
This patient has been assessed with a concern for Malnutrition and has been determined to have a diagnosis/diagnoses of Moderate protein-calorie malnutrition.    This patient is being managed with:   Diet Consistent Carbohydrate w/Evening Snack-  Entered: Jul 7 2023  5:36PM  
This patient has been assessed with a concern for Malnutrition and has been determined to have a diagnosis/diagnoses of Moderate protein-calorie malnutrition.    This patient is being managed with:   Diet Consistent Carbohydrate w/Evening Snack-  Entered: Jul 7 2023  5:36PM  
This patient has been assessed with a concern for Malnutrition and has been determined to have a diagnosis/diagnoses of Moderate protein-calorie malnutrition.    This patient is being managed with:   Diet Consistent Carbohydrate w/Evening Snack-  Supplement Feeding Modality:  Oral  Glucerna Shake Cans or Servings Per Day:  1       Frequency:  Three Times a day  Entered: Jul 23 2023  2:46PM  
This patient has been assessed with a concern for Malnutrition and has been determined to have a diagnosis/diagnoses of Moderate protein-calorie malnutrition.    This patient is being managed with:   Diet Consistent Carbohydrate w/Evening Snack-  Supplement Feeding Modality:  Oral  Glucerna Shake Cans or Servings Per Day:  1       Frequency:  Three Times a day  Entered: Jul 23 2023  2:46PM  
This patient has been assessed with a concern for Malnutrition and has been determined to have a diagnosis/diagnoses of Moderate protein-calorie malnutrition.    This patient is being managed with:   Diet NPO with Tube Feed-  Tube Feeding Modality: Nasogastric  Vital 1 Jeb (VITAL1)  Total Volume for 24 Hours (mL): 1800  Total Number of Cans: 8  Continuous  Starting Tube Feed Rate {mL per Hour}: 20  Increase Tube Feed Rate by (mL): 10     Every 4 hours  Until Goal Tube Feed Rate (mL per Hour): 75  Tube Feed Duration (in Hours): 24  Tube Feed Start Time: 10:00  Entered: Aug  7 2023  9:46AM  
This patient has been assessed with a concern for Malnutrition and has been determined to have a diagnosis/diagnoses of Moderate protein-calorie malnutrition.    This patient is being managed with:   Diet NPO-  Entered: Aug  9 2023  8:58AM  
This patient has been assessed with a concern for Malnutrition and has been determined to have a diagnosis/diagnoses of Moderate protein-calorie malnutrition.    This patient is being managed with:   Diet Consistent Carbohydrate w/Evening Snack-  Entered: Jul 7 2023  5:36PM  
This patient has been assessed with a concern for Malnutrition and has been determined to have a diagnosis/diagnoses of Moderate protein-calorie malnutrition.    This patient is being managed with:   Diet Consistent Carbohydrate w/Evening Snack-  Supplement Feeding Modality:  Oral  Ensure Enlive Cans or Servings Per Day:  3       Frequency:  Three Times a day  Entered: Jul 20 2023  5:54PM  
This patient has been assessed with a concern for Malnutrition and has been determined to have a diagnosis/diagnoses of Moderate protein-calorie malnutrition.    This patient is being managed with:   Diet Consistent Carbohydrate w/Evening Snack-  Supplement Feeding Modality:  Oral  Ensure Enlive Cans or Servings Per Day:  3       Frequency:  Three Times a day  Entered: Jul 21 2023  1:24PM  
This patient has been assessed with a concern for Malnutrition and has been determined to have a diagnosis/diagnoses of Moderate protein-calorie malnutrition.    This patient is being managed with:   Diet Consistent Carbohydrate w/Evening Snack-  Supplement Feeding Modality:  Oral  Glucerna Shake Cans or Servings Per Day:  1       Frequency:  Three Times a day  Entered: Jul 23 2023  2:46PM  
This patient has been assessed with a concern for Malnutrition and has been determined to have a diagnosis/diagnoses of Moderate protein-calorie malnutrition.    This patient is being managed with:   Diet NPO with Tube Feed-  Tube Feeding Modality: Nasogastric  Glucerna 1.2 Jeb (GLUCERNARTH)  Total Volume for 24 Hours (mL): 240  Total Number of Cans: 1  Continuous  Starting Tube Feed Rate {mL per Hour}: 10  Until Goal Tube Feed Rate (mL per Hour): 10  Tube Feed Duration (in Hours): 24  Tube Feed Start Time: 17:30  Entered: Aug  2 2023  5:09PM  
This patient has been assessed with a concern for Malnutrition and has been determined to have a diagnosis/diagnoses of Moderate protein-calorie malnutrition.    This patient is being managed with:   Diet NPO-  Entered: Aug  9 2023  8:58AM  
This patient has been assessed with a concern for Malnutrition and has been determined to have a diagnosis/diagnoses of Moderate protein-calorie malnutrition.    This patient is being managed with:   Diet NPO-  Entered: Aug  9 2023  8:58AM  
This patient has been assessed with a concern for Malnutrition and has been determined to have a diagnosis/diagnoses of Moderate protein-calorie malnutrition.    This patient is being managed with:   Diet NPO-  Entered: Jul 29 2023  7:54AM  
This patient has been assessed with a concern for Malnutrition and has been determined to have a diagnosis/diagnoses of Moderate protein-calorie malnutrition.    This patient is being managed with:   Diet NPO-  Entered: Jul 29 2023  7:54AM  
This patient has been assessed with a concern for Malnutrition and has been determined to have a diagnosis/diagnoses of Moderate protein-calorie malnutrition.    This patient is being managed with:   Diet Consistent Carbohydrate w/Evening Snack-  Entered: Jul 7 2023  5:36PM  
This patient has been assessed with a concern for Malnutrition and has been determined to have a diagnosis/diagnoses of Moderate protein-calorie malnutrition.    This patient is being managed with:   Diet Consistent Carbohydrate w/Evening Snack-  Supplement Feeding Modality:  Oral  Glucerna Shake Cans or Servings Per Day:  1       Frequency:  Three Times a day  Entered: Jul 23 2023  2:46PM  
This patient has been assessed with a concern for Malnutrition and has been determined to have a diagnosis/diagnoses of Moderate protein-calorie malnutrition.    This patient is being managed with:   Diet NPO with Tube Feed-  Tube Feeding Modality: Nasogastric  Glucerna 1.2 Jeb (GLUCERNARTH)  Total Volume for 24 Hours (mL): 240  Total Number of Cans: 1  Continuous  Starting Tube Feed Rate {mL per Hour}: 10  Until Goal Tube Feed Rate (mL per Hour): 10  Tube Feed Duration (in Hours): 24  Tube Feed Start Time: 17:30  Entered: Aug  2 2023  5:09PM  
This patient has been assessed with a concern for Malnutrition and has been determined to have a diagnosis/diagnoses of Moderate protein-calorie malnutrition.    This patient is being managed with:   Diet NPO-  Entered: Aug  9 2023  8:58AM  
This patient has been assessed with a concern for Malnutrition and has been determined to have a diagnosis/diagnoses of Moderate protein-calorie malnutrition.    This patient is being managed with:   Diet NPO-  Entered: Jul 29 2023  7:54AM  
This patient has been assessed with a concern for Malnutrition and has been determined to have a diagnosis/diagnoses of Moderate protein-calorie malnutrition.    This patient is being managed with:   Diet NPO-  Entered: Jul 29 2023  7:54AM  
This patient has been assessed with a concern for Malnutrition and has been determined to have a diagnosis/diagnoses of Moderate protein-calorie malnutrition.    This patient is being managed with:   Diet NPO-  Except Medications  Entered: Aug  3 2023  9:19AM  
This patient has been assessed with a concern for Malnutrition and has been determined to have a diagnosis/diagnoses of Moderate protein-calorie malnutrition.    This patient is being managed with:   Diet NPO-  Except Medications  Entered: Jul 18 2023  6:57AM  
This patient has been assessed with a concern for Malnutrition and has been determined to have a diagnosis/diagnoses of Moderate protein-calorie malnutrition.    This patient is being managed with:   Diet NPO-  Except Medications  Entered: Jul 18 2023  6:57AM  
This patient has been assessed with a concern for Malnutrition and has been determined to have a diagnosis/diagnoses of Moderate protein-calorie malnutrition.    This patient is being managed with:   Diet Consistent Carbohydrate w/Evening Snack-  1500mL Fluid Restriction ([id number])  Supplement Feeding Modality:  Oral  Ensure Enlive Cans or Servings Per Day:  3       Frequency:  Three Times a day  Entered: Jul 22 2023 10:07PM  
This patient has been assessed with a concern for Malnutrition and has been determined to have a diagnosis/diagnoses of Moderate protein-calorie malnutrition.    This patient is being managed with:   Diet Consistent Carbohydrate w/Evening Snack-  Entered: Jul 7 2023  5:36PM  
This patient has been assessed with a concern for Malnutrition and has been determined to have a diagnosis/diagnoses of Moderate protein-calorie malnutrition.    This patient is being managed with:   Diet Consistent Carbohydrate w/Evening Snack-  Supplement Feeding Modality:  Oral  Glucerna Shake Cans or Servings Per Day:  1       Frequency:  Three Times a day  Entered: Jul 23 2023  2:46PM  
This patient has been assessed with a concern for Malnutrition and has been determined to have a diagnosis/diagnoses of Moderate protein-calorie malnutrition.    This patient is being managed with:   Diet Consistent Carbohydrate w/Evening Snack-  Supplement Feeding Modality:  Oral  Glucerna Shake Cans or Servings Per Day:  1       Frequency:  Three Times a day  Entered: Jul 23 2023  2:46PM  
This patient has been assessed with a concern for Malnutrition and has been determined to have a diagnosis/diagnoses of Moderate protein-calorie malnutrition.    This patient is being managed with:   Diet NPO with Tube Feed-  Tube Feeding Modality: Nasogastric  Vital 1 Jeb (VITAL1)  Continuous  Starting Tube Feed Rate {mL per Hour}: 10  Until Goal Tube Feed Rate (mL per Hour): 10  Tube Feed Duration (in Hours): 24  Tube Feed Start Time: 04:30  Entered: Aug  4 2023  4:11PM  
This patient has been assessed with a concern for Malnutrition and has been determined to have a diagnosis/diagnoses of Moderate protein-calorie malnutrition.    This patient is being managed with:   Diet NPO with Tube Feed-  Tube Feeding Modality: Nasogastric  Vital 1 Jeb (VITAL1)  Total Volume for 24 Hours (mL): 1800  Total Number of Cans: 8  Continuous  Starting Tube Feed Rate {mL per Hour}: 20  Increase Tube Feed Rate by (mL): 10     Every 4 hours  Until Goal Tube Feed Rate (mL per Hour): 75  Tube Feed Duration (in Hours): 24  Tube Feed Start Time: 10:00  Entered: Aug  7 2023  9:46AM  
This patient has been assessed with a concern for Malnutrition and has been determined to have a diagnosis/diagnoses of Moderate protein-calorie malnutrition.    This patient is being managed with:   Diet NPO-     Special Instructions for Nursing:  CODE STROKE; NPO until Dysphagia screen or Speech Bedside Swallow Evaluation completed and passed  Entered: Jul 17 2023  2:32PM  
This patient has been assessed with a concern for Malnutrition and has been determined to have a diagnosis/diagnoses of Moderate protein-calorie malnutrition.    This patient is being managed with:   Diet NPO-  Entered: Aug  9 2023  8:58AM  
This patient has been assessed with a concern for Malnutrition and has been determined to have a diagnosis/diagnoses of Moderate protein-calorie malnutrition.    This patient is being managed with:   Diet NPO-  Entered: Jul 29 2023  7:54AM  
This patient has been assessed with a concern for Malnutrition and has been determined to have a diagnosis/diagnoses of Moderate protein-calorie malnutrition.    This patient is being managed with:   Diet Consistent Carbohydrate w/Evening Snack-  Supplement Feeding Modality:  Oral  Glucerna Shake Cans or Servings Per Day:  1       Frequency:  Three Times a day  Entered: Jul 23 2023  2:46PM  
This patient has been assessed with a concern for Malnutrition and has been determined to have a diagnosis/diagnoses of Moderate protein-calorie malnutrition.    This patient is being managed with:   Diet NPO with Tube Feed-  Tube Feeding Modality: Nasogastric  Vital 1 Jeb (VITAL1)  Total Volume for 24 Hours (mL): 1800  Total Number of Cans: 8  Continuous  Starting Tube Feed Rate {mL per Hour}: 20  Increase Tube Feed Rate by (mL): 10     Every 4 hours  Until Goal Tube Feed Rate (mL per Hour): 75  Tube Feed Duration (in Hours): 24  Tube Feed Start Time: 10:00  Entered: Aug  7 2023  9:46AM  
This patient has been assessed with a concern for Malnutrition and has been determined to have a diagnosis/diagnoses of Moderate protein-calorie malnutrition.    This patient is being managed with:   Diet NPO-  Entered: Aug  9 2023  8:58AM  
This patient has been assessed with a concern for Malnutrition and has been determined to have a diagnosis/diagnoses of Moderate protein-calorie malnutrition.    This patient is being managed with:   Diet NPO-  Entered: Jul 29 2023  7:54AM  
This patient has been assessed with a concern for Malnutrition and has been determined to have a diagnosis/diagnoses of Moderate protein-calorie malnutrition.    This patient is being managed with:   Diet NPO-  Except Medications  Entered: Aug  3 2023  9:19AM  
This patient has been assessed with a concern for Malnutrition and has been determined to have a diagnosis/diagnoses of Moderate protein-calorie malnutrition.    This patient is being managed with:   Diet Consistent Carbohydrate w/Evening Snack-  Entered: Jul 7 2023  5:36PM  
This patient has been assessed with a concern for Malnutrition and has been determined to have a diagnosis/diagnoses of Moderate protein-calorie malnutrition.    This patient is being managed with:   Diet Consistent Carbohydrate w/Evening Snack-  Entered: Jul 7 2023  5:36PM  
This patient has been assessed with a concern for Malnutrition and has been determined to have a diagnosis/diagnoses of Moderate protein-calorie malnutrition.    This patient is being managed with:   Diet NPO with Tube Feed-  Tube Feeding Modality: Nasogastric  Glucerna 1.2 Jeb (GLUCERNARTH)  Total Volume for 24 Hours (mL): 240  Total Number of Cans: 1  Continuous  Starting Tube Feed Rate {mL per Hour}: 10  Until Goal Tube Feed Rate (mL per Hour): 10  Tube Feed Duration (in Hours): 24  Tube Feed Start Time: 17:30  Entered: Aug  2 2023  5:09PM  
This patient has been assessed with a concern for Malnutrition and has been determined to have a diagnosis/diagnoses of Moderate protein-calorie malnutrition.    This patient is being managed with:   Diet NPO with Tube Feed-  Tube Feeding Modality: Nasogastric  Vital 1 Jeb (VITAL1)  Continuous  Starting Tube Feed Rate {mL per Hour}: 10  Until Goal Tube Feed Rate (mL per Hour): 10  Tube Feed Duration (in Hours): 24  Tube Feed Start Time: 04:30  Entered: Aug  4 2023  4:11PM  
This patient has been assessed with a concern for Malnutrition and has been determined to have a diagnosis/diagnoses of Moderate protein-calorie malnutrition.    This patient is being managed with:   Diet NPO with Tube Feed-  Tube Feeding Modality: Nasogastric  Vital 1 Jeb (VITAL1)  Continuous  Starting Tube Feed Rate {mL per Hour}: 10  Until Goal Tube Feed Rate (mL per Hour): 10  Tube Feed Duration (in Hours): 24  Tube Feed Start Time: 04:30  Entered: Aug  4 2023  4:11PM  
This patient has been assessed with a concern for Malnutrition and has been determined to have a diagnosis/diagnoses of Moderate protein-calorie malnutrition.    This patient is being managed with:   Diet NPO with Tube Feed-  Tube Feeding Modality: Nasogastric  Vital 1 Jeb (VITAL1)  Total Volume for 24 Hours (mL): 1800  Total Number of Cans: 8  Continuous  Starting Tube Feed Rate {mL per Hour}: 20  Increase Tube Feed Rate by (mL): 10     Every 4 hours  Until Goal Tube Feed Rate (mL per Hour): 75  Tube Feed Duration (in Hours): 24  Tube Feed Start Time: 10:00  Entered: Aug  7 2023  9:46AM  
This patient has been assessed with a concern for Malnutrition and has been determined to have a diagnosis/diagnoses of Moderate protein-calorie malnutrition.    This patient is being managed with:   Diet Consistent Carbohydrate w/Evening Snack-  Entered: Jul 7 2023  5:36PM  
This patient has been assessed with a concern for Malnutrition and has been determined to have a diagnosis/diagnoses of Moderate protein-calorie malnutrition.    This patient is being managed with:   Diet NPO with Tube Feed-  Tube Feeding Modality: Nasogastric  Vital 1 Jeb (VITAL1)  Continuous  Starting Tube Feed Rate {mL per Hour}: 10  Until Goal Tube Feed Rate (mL per Hour): 10  Tube Feed Duration (in Hours): 24  Tube Feed Start Time: 04:30  Entered: Aug  4 2023  4:11PM  
This patient has been assessed with a concern for Malnutrition and has been determined to have a diagnosis/diagnoses of Moderate protein-calorie malnutrition.    This patient is being managed with:   Diet NPO-  Entered: Jul 29 2023  7:54AM  
This patient has been assessed with a concern for Malnutrition and has been determined to have a diagnosis/diagnoses of Moderate protein-calorie malnutrition.    This patient is being managed with:   Diet NPO-  Except Medications  Entered: Aug  3 2023  9:19AM  
This patient has been assessed with a concern for Malnutrition and has been determined to have a diagnosis/diagnoses of Moderate protein-calorie malnutrition.    This patient is being managed with:   Diet NPO with Tube Feed-  Tube Feeding Modality: Nasogastric  Vital 1 Jeb (VITAL1)  Continuous  Starting Tube Feed Rate {mL per Hour}: 10  Until Goal Tube Feed Rate (mL per Hour): 10  Tube Feed Duration (in Hours): 24  Tube Feed Start Time: 04:30  Entered: Aug  4 2023  4:11PM  
This patient has been assessed with a concern for Malnutrition and has been determined to have a diagnosis/diagnoses of Moderate protein-calorie malnutrition.    This patient is being managed with:   Diet NPO-  Except Medications  Entered: Aug  1 2023 11:50PM

## 2023-08-13 LAB
CULTURE RESULTS: SIGNIFICANT CHANGE UP
CULTURE RESULTS: SIGNIFICANT CHANGE UP
ORGANISM # SPEC MICROSCOPIC CNT: SIGNIFICANT CHANGE UP
SPECIMEN SOURCE: SIGNIFICANT CHANGE UP
SPECIMEN SOURCE: SIGNIFICANT CHANGE UP

## 2023-08-13 PROCEDURE — 97164 PT RE-EVAL EST PLAN CARE: CPT

## 2023-08-13 PROCEDURE — 83540 ASSAY OF IRON: CPT

## 2023-08-13 PROCEDURE — 82607 VITAMIN B-12: CPT

## 2023-08-13 PROCEDURE — 82728 ASSAY OF FERRITIN: CPT

## 2023-08-13 PROCEDURE — 83550 IRON BINDING TEST: CPT

## 2023-08-13 PROCEDURE — 80200 ASSAY OF TOBRAMYCIN: CPT

## 2023-08-13 PROCEDURE — 80053 COMPREHEN METABOLIC PANEL: CPT

## 2023-08-13 PROCEDURE — 83880 ASSAY OF NATRIURETIC PEPTIDE: CPT

## 2023-08-13 PROCEDURE — P9037: CPT

## 2023-08-13 PROCEDURE — 70450 CT HEAD/BRAIN W/O DYE: CPT | Mod: MA

## 2023-08-13 PROCEDURE — 86901 BLOOD TYPING SEROLOGIC RH(D): CPT

## 2023-08-13 PROCEDURE — 92610 EVALUATE SWALLOWING FUNCTION: CPT

## 2023-08-13 PROCEDURE — 97166 OT EVAL MOD COMPLEX 45 MIN: CPT

## 2023-08-13 PROCEDURE — 80061 LIPID PANEL: CPT

## 2023-08-13 PROCEDURE — 85610 PROTHROMBIN TIME: CPT

## 2023-08-13 PROCEDURE — 71260 CT THORAX DX C+: CPT

## 2023-08-13 PROCEDURE — 86705 HEP B CORE ANTIBODY IGM: CPT

## 2023-08-13 PROCEDURE — 83615 LACTATE (LD) (LDH) ENZYME: CPT

## 2023-08-13 PROCEDURE — 84484 ASSAY OF TROPONIN QUANT: CPT

## 2023-08-13 PROCEDURE — 95716 VEEG EA 12-26HR CONT MNTR: CPT

## 2023-08-13 PROCEDURE — 82746 ASSAY OF FOLIC ACID SERUM: CPT

## 2023-08-13 PROCEDURE — 82248 BILIRUBIN DIRECT: CPT

## 2023-08-13 PROCEDURE — 82570 ASSAY OF URINE CREATININE: CPT

## 2023-08-13 PROCEDURE — 82150 ASSAY OF AMYLASE: CPT

## 2023-08-13 PROCEDURE — 95708 EEG WO VID EA 12-26HR UNMNTR: CPT

## 2023-08-13 PROCEDURE — 83690 ASSAY OF LIPASE: CPT

## 2023-08-13 PROCEDURE — 87040 BLOOD CULTURE FOR BACTERIA: CPT

## 2023-08-13 PROCEDURE — 80235 DRUG ASSAY LACOSAMIDE: CPT

## 2023-08-13 PROCEDURE — 82803 BLOOD GASES ANY COMBINATION: CPT

## 2023-08-13 PROCEDURE — 86803 HEPATITIS C AB TEST: CPT

## 2023-08-13 PROCEDURE — 80076 HEPATIC FUNCTION PANEL: CPT

## 2023-08-13 PROCEDURE — 87186 SC STD MICRODIL/AGAR DIL: CPT

## 2023-08-13 PROCEDURE — 82962 GLUCOSE BLOOD TEST: CPT

## 2023-08-13 PROCEDURE — 97530 THERAPEUTIC ACTIVITIES: CPT

## 2023-08-13 PROCEDURE — 83735 ASSAY OF MAGNESIUM: CPT

## 2023-08-13 PROCEDURE — 81001 URINALYSIS AUTO W/SCOPE: CPT

## 2023-08-13 PROCEDURE — 85362 FIBRIN DEGRADATION PRODUCTS: CPT

## 2023-08-13 PROCEDURE — 70496 CT ANGIOGRAPHY HEAD: CPT | Mod: MA

## 2023-08-13 PROCEDURE — 85379 FIBRIN DEGRADATION QUANT: CPT

## 2023-08-13 PROCEDURE — 86704 HEP B CORE ANTIBODY TOTAL: CPT

## 2023-08-13 PROCEDURE — 84550 ASSAY OF BLOOD/URIC ACID: CPT

## 2023-08-13 PROCEDURE — 84466 ASSAY OF TRANSFERRIN: CPT

## 2023-08-13 PROCEDURE — 97161 PT EVAL LOW COMPLEX 20 MIN: CPT

## 2023-08-13 PROCEDURE — 84300 ASSAY OF URINE SODIUM: CPT

## 2023-08-13 PROCEDURE — 87340 HEPATITIS B SURFACE AG IA: CPT

## 2023-08-13 PROCEDURE — C9254: CPT

## 2023-08-13 PROCEDURE — 82140 ASSAY OF AMMONIA: CPT

## 2023-08-13 PROCEDURE — 85025 COMPLETE CBC W/AUTO DIFF WBC: CPT

## 2023-08-13 PROCEDURE — 86022 PLATELET ANTIBODIES: CPT

## 2023-08-13 PROCEDURE — 93306 TTE W/DOPPLER COMPLETE: CPT

## 2023-08-13 PROCEDURE — 36415 COLL VENOUS BLD VENIPUNCTURE: CPT

## 2023-08-13 PROCEDURE — A9585: CPT

## 2023-08-13 PROCEDURE — 84100 ASSAY OF PHOSPHORUS: CPT

## 2023-08-13 PROCEDURE — 82553 CREATINE MB FRACTION: CPT

## 2023-08-13 PROCEDURE — P9045: CPT

## 2023-08-13 PROCEDURE — 92526 ORAL FUNCTION THERAPY: CPT

## 2023-08-13 PROCEDURE — 80307 DRUG TEST PRSMV CHEM ANLYZR: CPT

## 2023-08-13 PROCEDURE — 87641 MR-STAPH DNA AMP PROBE: CPT

## 2023-08-13 PROCEDURE — 70553 MRI BRAIN STEM W/O & W/DYE: CPT | Mod: MA

## 2023-08-13 PROCEDURE — 87640 STAPH A DNA AMP PROBE: CPT

## 2023-08-13 PROCEDURE — 99285 EMERGENCY DEPT VISIT HI MDM: CPT | Mod: 25

## 2023-08-13 PROCEDURE — 93970 EXTREMITY STUDY: CPT

## 2023-08-13 PROCEDURE — 97116 GAIT TRAINING THERAPY: CPT

## 2023-08-13 PROCEDURE — 84439 ASSAY OF FREE THYROXINE: CPT

## 2023-08-13 PROCEDURE — 83605 ASSAY OF LACTIC ACID: CPT

## 2023-08-13 PROCEDURE — 82272 OCCULT BLD FECES 1-3 TESTS: CPT

## 2023-08-13 PROCEDURE — 92523 SPEECH SOUND LANG COMPREHEN: CPT

## 2023-08-13 PROCEDURE — 0042T: CPT

## 2023-08-13 PROCEDURE — 94640 AIRWAY INHALATION TREATMENT: CPT

## 2023-08-13 PROCEDURE — 82550 ASSAY OF CK (CPK): CPT

## 2023-08-13 PROCEDURE — 96374 THER/PROPH/DIAG INJ IV PUSH: CPT

## 2023-08-13 PROCEDURE — 86706 HEP B SURFACE ANTIBODY: CPT

## 2023-08-13 PROCEDURE — 86880 COOMBS TEST DIRECT: CPT

## 2023-08-13 PROCEDURE — 80048 BASIC METABOLIC PNL TOTAL CA: CPT

## 2023-08-13 PROCEDURE — 85027 COMPLETE CBC AUTOMATED: CPT

## 2023-08-13 PROCEDURE — 71275 CT ANGIOGRAPHY CHEST: CPT

## 2023-08-13 PROCEDURE — 70498 CT ANGIOGRAPHY NECK: CPT | Mod: MA

## 2023-08-13 PROCEDURE — P9040: CPT

## 2023-08-13 PROCEDURE — 83874 ASSAY OF MYOGLOBIN: CPT

## 2023-08-13 PROCEDURE — 71045 X-RAY EXAM CHEST 1 VIEW: CPT

## 2023-08-13 PROCEDURE — 87070 CULTURE OTHR SPECIMN AEROBIC: CPT

## 2023-08-13 PROCEDURE — 74177 CT ABD & PELVIS W/CONTRAST: CPT

## 2023-08-13 PROCEDURE — 74018 RADEX ABDOMEN 1 VIEW: CPT

## 2023-08-13 PROCEDURE — 87150 DNA/RNA AMPLIFIED PROBE: CPT

## 2023-08-13 PROCEDURE — 94003 VENT MGMT INPAT SUBQ DAY: CPT

## 2023-08-13 PROCEDURE — P9047: CPT

## 2023-08-13 PROCEDURE — 93971 EXTREMITY STUDY: CPT

## 2023-08-13 PROCEDURE — 84443 ASSAY THYROID STIM HORMONE: CPT

## 2023-08-13 PROCEDURE — 86923 COMPATIBILITY TEST ELECTRIC: CPT

## 2023-08-13 PROCEDURE — P9016: CPT

## 2023-08-13 PROCEDURE — 84478 ASSAY OF TRIGLYCERIDES: CPT

## 2023-08-13 PROCEDURE — 83036 HEMOGLOBIN GLYCOSYLATED A1C: CPT

## 2023-08-13 PROCEDURE — 97110 THERAPEUTIC EXERCISES: CPT

## 2023-08-13 PROCEDURE — 84145 PROCALCITONIN (PCT): CPT

## 2023-08-13 PROCEDURE — 93005 ELECTROCARDIOGRAM TRACING: CPT

## 2023-08-13 PROCEDURE — 84156 ASSAY OF PROTEIN URINE: CPT

## 2023-08-13 PROCEDURE — 86900 BLOOD TYPING SEROLOGIC ABO: CPT

## 2023-08-13 PROCEDURE — 82247 BILIRUBIN TOTAL: CPT

## 2023-08-13 PROCEDURE — 83930 ASSAY OF BLOOD OSMOLALITY: CPT

## 2023-08-13 PROCEDURE — 85384 FIBRINOGEN ACTIVITY: CPT

## 2023-08-13 PROCEDURE — 85045 AUTOMATED RETICULOCYTE COUNT: CPT

## 2023-08-13 PROCEDURE — 80202 ASSAY OF VANCOMYCIN: CPT

## 2023-08-13 PROCEDURE — 87389 HIV-1 AG W/HIV-1&-2 AB AG IA: CPT

## 2023-08-13 PROCEDURE — 82010 KETONE BODYS QUAN: CPT

## 2023-08-13 PROCEDURE — 36430 TRANSFUSION BLD/BLD COMPNT: CPT

## 2023-08-13 PROCEDURE — 86850 RBC ANTIBODY SCREEN: CPT

## 2023-08-13 PROCEDURE — 87517 HEPATITIS B DNA QUANT: CPT

## 2023-08-13 PROCEDURE — 86709 HEPATITIS A IGM ANTIBODY: CPT

## 2023-08-13 PROCEDURE — 82533 TOTAL CORTISOL: CPT

## 2023-08-13 PROCEDURE — 85730 THROMBOPLASTIN TIME PARTIAL: CPT

## 2023-08-13 PROCEDURE — 83010 ASSAY OF HAPTOGLOBIN QUANT: CPT

## 2023-08-13 PROCEDURE — P9100: CPT

## 2023-08-13 PROCEDURE — 87181 SC STD AGAR DILUTION PER AGT: CPT

## 2023-08-13 PROCEDURE — 83935 ASSAY OF URINE OSMOLALITY: CPT

## 2023-08-13 PROCEDURE — 95700 EEG CONT REC W/VID EEG TECH: CPT

## 2023-08-13 PROCEDURE — L8699: CPT

## 2023-08-14 LAB
CORTICOSTEROID BINDING GLOBULIN RESULT: 1.2 MG/DL — LOW
CORTIS F/TOTAL MFR SERPL: 71 % — SIGNIFICANT CHANGE UP
CORTIS SERPL-MCNC: 28 UG/DL — HIGH
CORTISOL, FREE RESULT: 20 UG/DL — HIGH
CULTURE RESULTS: SIGNIFICANT CHANGE UP
CULTURE RESULTS: SIGNIFICANT CHANGE UP
SPECIMEN SOURCE: SIGNIFICANT CHANGE UP
SPECIMEN SOURCE: SIGNIFICANT CHANGE UP

## 2023-08-15 LAB
CORTICOSTEROID BINDING GLOBULIN RESULT: 1.3 MG/DL — LOW
CORTIS F/TOTAL MFR SERPL: 57 % — SIGNIFICANT CHANGE UP
CORTIS SERPL-MCNC: 20 UG/DL — HIGH
CORTISOL, FREE RESULT: 11 UG/DL — HIGH

## 2023-08-18 DIAGNOSIS — T45.1X5A ADVERSE EFFECT OF ANTINEOPLASTIC AND IMMUNOSUPPRESSIVE DRUGS, INITIAL ENCOUNTER: ICD-10-CM
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
## 2023-08-18 DIAGNOSIS — E07.81 SICK-EUTHYROID SYNDROME: ICD-10-CM

## 2023-08-18 DIAGNOSIS — R65.21 SEVERE SEPSIS WITH SEPTIC SHOCK: ICD-10-CM

## 2023-08-18 DIAGNOSIS — R21 RASH AND OTHER NONSPECIFIC SKIN ERUPTION: ICD-10-CM

## 2023-08-18 DIAGNOSIS — A41.59 OTHER GRAM-NEGATIVE SEPSIS: ICD-10-CM

## 2023-08-18 DIAGNOSIS — R33.9 RETENTION OF URINE, UNSPECIFIED: ICD-10-CM

## 2023-08-18 DIAGNOSIS — Z51.5 ENCOUNTER FOR PALLIATIVE CARE: ICD-10-CM

## 2023-08-18 DIAGNOSIS — G93.5 COMPRESSION OF BRAIN: ICD-10-CM

## 2023-08-18 DIAGNOSIS — C71.9 MALIGNANT NEOPLASM OF BRAIN, UNSPECIFIED: ICD-10-CM

## 2023-08-18 DIAGNOSIS — D62 ACUTE POSTHEMORRHAGIC ANEMIA: ICD-10-CM

## 2023-08-18 DIAGNOSIS — R47.01 APHASIA: ICD-10-CM

## 2023-08-18 DIAGNOSIS — N17.0 ACUTE KIDNEY FAILURE WITH TUBULAR NECROSIS: ICD-10-CM

## 2023-08-18 DIAGNOSIS — I16.1 HYPERTENSIVE EMERGENCY: ICD-10-CM

## 2023-08-18 DIAGNOSIS — Z86.718 PERSONAL HISTORY OF OTHER VENOUS THROMBOSIS AND EMBOLISM: ICD-10-CM

## 2023-08-18 DIAGNOSIS — J39.8 OTHER SPECIFIED DISEASES OF UPPER RESPIRATORY TRACT: ICD-10-CM

## 2023-08-18 DIAGNOSIS — I25.10 ATHEROSCLEROTIC HEART DISEASE OF NATIVE CORONARY ARTERY WITHOUT ANGINA PECTORIS: ICD-10-CM

## 2023-08-18 DIAGNOSIS — I10 ESSENTIAL (PRIMARY) HYPERTENSION: ICD-10-CM

## 2023-08-18 DIAGNOSIS — G93.6 CEREBRAL EDEMA: ICD-10-CM

## 2023-08-18 DIAGNOSIS — I48.91 UNSPECIFIED ATRIAL FIBRILLATION: ICD-10-CM

## 2023-08-18 DIAGNOSIS — K25.9 GASTRIC ULCER, UNSPECIFIED AS ACUTE OR CHRONIC, WITHOUT HEMORRHAGE OR PERFORATION: ICD-10-CM

## 2023-08-18 DIAGNOSIS — N40.0 BENIGN PROSTATIC HYPERPLASIA WITHOUT LOWER URINARY TRACT SYMPTOMS: ICD-10-CM

## 2023-08-18 DIAGNOSIS — D61.818 OTHER PANCYTOPENIA: ICD-10-CM

## 2023-08-18 DIAGNOSIS — Z66 DO NOT RESUSCITATE: ICD-10-CM
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
## 2023-08-18 DIAGNOSIS — D61.810 ANTINEOPLASTIC CHEMOTHERAPY INDUCED PANCYTOPENIA: ICD-10-CM

## 2023-08-18 DIAGNOSIS — K22.11 ULCER OF ESOPHAGUS WITH BLEEDING: ICD-10-CM

## 2023-08-18 DIAGNOSIS — K56.7 ILEUS, UNSPECIFIED: ICD-10-CM

## 2023-08-18 DIAGNOSIS — Z88.0 ALLERGY STATUS TO PENICILLIN: ICD-10-CM

## 2023-08-18 DIAGNOSIS — K44.9 DIAPHRAGMATIC HERNIA WITHOUT OBSTRUCTION OR GANGRENE: ICD-10-CM

## 2023-08-18 DIAGNOSIS — G93.41 METABOLIC ENCEPHALOPATHY: ICD-10-CM

## 2023-08-18 DIAGNOSIS — E11.649 TYPE 2 DIABETES MELLITUS WITH HYPOGLYCEMIA WITHOUT COMA: ICD-10-CM

## 2023-08-18 DIAGNOSIS — I47.1 SUPRAVENTRICULAR TACHYCARDIA: ICD-10-CM

## 2023-08-18 DIAGNOSIS — I48.92 UNSPECIFIED ATRIAL FLUTTER: ICD-10-CM

## 2023-08-18 DIAGNOSIS — N20.0 CALCULUS OF KIDNEY: ICD-10-CM

## 2023-08-18 DIAGNOSIS — E22.2 SYNDROME OF INAPPROPRIATE SECRETION OF ANTIDIURETIC HORMONE: ICD-10-CM

## 2023-08-18 DIAGNOSIS — Z88.8 ALLERGY STATUS TO OTHER DRUGS, MEDICAMENTS AND BIOLOGICAL SUBSTANCES: ICD-10-CM

## 2023-08-18 DIAGNOSIS — J80 ACUTE RESPIRATORY DISTRESS SYNDROME: ICD-10-CM

## 2023-08-18 DIAGNOSIS — E44.0 MODERATE PROTEIN-CALORIE MALNUTRITION: ICD-10-CM

## 2023-08-18 DIAGNOSIS — D69.59 OTHER SECONDARY THROMBOCYTOPENIA: ICD-10-CM

## 2023-08-18 DIAGNOSIS — R53.2 FUNCTIONAL QUADRIPLEGIA: ICD-10-CM

## 2023-08-18 DIAGNOSIS — T17.998A OTHER FOREIGN OBJECT IN RESPIRATORY TRACT, PART UNSPECIFIED CAUSING OTHER INJURY, INITIAL ENCOUNTER: ICD-10-CM

## 2023-08-18 DIAGNOSIS — E78.5 HYPERLIPIDEMIA, UNSPECIFIED: ICD-10-CM

## 2023-08-18 DIAGNOSIS — Z99.11 DEPENDENCE ON RESPIRATOR [VENTILATOR] STATUS: ICD-10-CM

## 2023-08-18 DIAGNOSIS — E87.6 HYPOKALEMIA: ICD-10-CM

## 2023-08-18 DIAGNOSIS — D50.9 IRON DEFICIENCY ANEMIA, UNSPECIFIED: ICD-10-CM

## 2023-08-18 DIAGNOSIS — E87.20 ACIDOSIS, UNSPECIFIED: ICD-10-CM

## 2023-08-18 DIAGNOSIS — F41.9 ANXIETY DISORDER, UNSPECIFIED: ICD-10-CM

## 2023-09-22 NOTE — PROGRESS NOTE ADULT - PROBLEM/PLAN-5
DISPLAY PLAN FREE TEXT
no change in level of consciousness/no confusion/no dizziness/no fever/no loss of consciousness/no nausea/no numbness/no vomiting/no weakness

## 2023-11-12 ENCOUNTER — RX RENEWAL (OUTPATIENT)
Age: 68
End: 2023-11-12

## 2023-11-12 RX ORDER — ESCITALOPRAM OXALATE 5 MG/1
5 TABLET ORAL
Qty: 90 | Refills: 1 | Status: ACTIVE | COMMUNITY
Start: 2022-03-24 | End: 1900-01-01

## 2024-01-19 NOTE — PROGRESS NOTE ADULT - ATTENDING COMMENTS
Lipids: Cholesterol is at 255, triglycerides are at 173 and LDL is at 161 - per ASCVD risk score, no statin recommendations is needed at this time, no immediate risk factors, continue with heart healthy diet, we will repeat lipids at your next annual visit. We can also discuss with patient if she would like to have a heart scan to evaluate to see if she does have any calcification or plaque in heart arteries, if she would like to get this done, then an order will be needed.    Hepatitis C is negative    CMP, TSH studies, A1C and CBC without concerns    Leslee OSORIO Speaks English but prefers [place].    Flat affect, perseverates, slightly delayed responses, only oriented to self, follows commands with delay, brisker on right than left but 5/5 to confrontational testing    - MRI +/-  - OR planned for Thursday

## 2024-01-30 NOTE — PROCEDURAL SAFETY CHECKLIST WITH OR WITHOUT SEDATION - NSSIDESITEMARKD_GEN_ALL_CORE
done
done
Patient's pain was treated.  X-ray was obtained.  There is a questionable fracture of the right index finger.  Patient is in his own splint which she will keep.  Patient will follow with orthopedics.  Recommend to ice and elevate.

## 2024-04-15 NOTE — DIETITIAN INITIAL EVALUATION ADULT. - OBTAIN WEEKLY WEIGHT
Report note:    Verbal report received from ANSELMO Greco on Ms. Rhodes being transferred to [address] [place] [address] for routine post-op.     Report consisted of patient's Situation, Background, Assessment and Recommendations (SBAR).      Information from the following report(s) Nurse Handoff Report, Adult Overview, Surgery Report, and Intake/Output was reviewed with the transferring nurse.    yes

## 2024-04-16 LAB
25(OH)D3 SERPL-MCNC: 30.4 NG/ML
25(OH)D3 SERPL-MCNC: 33 NG/ML
ALBUMIN SERPL ELPH-MCNC: 3.6 G/DL
ALP BLD-CCNC: 107 U/L
ALT SERPL-CCNC: 9 U/L
AMYLASE/CREAT SERPL: 70 U/L
ANION GAP SERPL CALC-SCNC: 10 MMOL/L
ANION GAP SERPL CALC-SCNC: 11 MMOL/L
ANION GAP SERPL CALC-SCNC: 16 MMOL/L
ANION GAP SERPL CALC-SCNC: 16 MMOL/L
ANION GAP SERPL CALC-SCNC: 18 MMOL/L
ANION GAP SERPL CALC-SCNC: 25 MMOL/L
APPEARANCE: CLEAR
AST SERPL-CCNC: 15 U/L
BACTERIA UR CULT: NORMAL
BACTERIA: NEGATIVE
BASOPHILS # BLD AUTO: 0.04 K/UL
BASOPHILS # BLD AUTO: 0.05 K/UL
BASOPHILS # BLD AUTO: 0.07 K/UL
BASOPHILS # BLD AUTO: 0.09 K/UL
BASOPHILS NFR BLD AUTO: 0.7 %
BASOPHILS NFR BLD AUTO: 0.7 %
BASOPHILS NFR BLD AUTO: 0.9 %
BASOPHILS NFR BLD AUTO: 1.7 %
BILIRUB SERPL-MCNC: 0.6 MG/DL
BILIRUBIN URINE: NEGATIVE
BLOOD URINE: NEGATIVE
BUN SERPL-MCNC: 8 MG/DL
BUN SERPL-MCNC: 9 MG/DL
CALCIUM SERPL-MCNC: 8.9 MG/DL
CALCIUM SERPL-MCNC: 9 MG/DL
CALCIUM SERPL-MCNC: 9.3 MG/DL
CALCIUM SERPL-MCNC: 9.9 MG/DL
CHLORIDE SERPL-SCNC: 100 MMOL/L
CHLORIDE SERPL-SCNC: 100 MMOL/L
CHLORIDE SERPL-SCNC: 103 MMOL/L
CHLORIDE SERPL-SCNC: 91 MMOL/L
CHLORIDE SERPL-SCNC: 91 MMOL/L
CHLORIDE SERPL-SCNC: 96 MMOL/L
CHOLEST SERPL-MCNC: 117 MG/DL
CO2 SERPL-SCNC: 19 MMOL/L
CO2 SERPL-SCNC: 23 MMOL/L
CO2 SERPL-SCNC: 24 MMOL/L
CO2 SERPL-SCNC: 26 MMOL/L
CO2 SERPL-SCNC: 26 MMOL/L
CO2 SERPL-SCNC: 28 MMOL/L
COLOR: YELLOW
CREAT SERPL-MCNC: 0.51 MG/DL
CREAT SERPL-MCNC: 0.7 MG/DL
CREAT SERPL-MCNC: 0.74 MG/DL
CREAT SERPL-MCNC: 0.91 MG/DL
CREAT SERPL-MCNC: 1.02 MG/DL
CREAT SERPL-MCNC: 1.05 MG/DL
CREAT SPEC-SCNC: 236 MG/DL
EGFR: 100 ML/MIN/1.73M2
EGFR: 102 ML/MIN/1.73M2
EGFR: 112 ML/MIN/1.73M2
EGFR: 78 ML/MIN/1.73M2
EGFR: 81 ML/MIN/1.73M2
EGFR: 92 ML/MIN/1.73M2
EOSINOPHIL # BLD AUTO: 0.42 K/UL
EOSINOPHIL # BLD AUTO: 0.57 K/UL
EOSINOPHIL # BLD AUTO: 2.09 K/UL
EOSINOPHIL # BLD AUTO: 2.52 K/UL
EOSINOPHIL NFR BLD AUTO: 10.4 %
EOSINOPHIL NFR BLD AUTO: 23.9 %
EOSINOPHIL NFR BLD AUTO: 30.7 %
EOSINOPHIL NFR BLD AUTO: 9.6 %
ESTIMATED AVERAGE GLUCOSE: 123 MG/DL
ESTIMATED AVERAGE GLUCOSE: 143 MG/DL
FERRITIN SERPL-MCNC: 428 NG/ML
FOLATE SERPL-MCNC: >20 NG/ML
GLUCOSE QUALITATIVE U: NEGATIVE
GLUCOSE SERPL-MCNC: 116 MG/DL
GLUCOSE SERPL-MCNC: 161 MG/DL
GLUCOSE SERPL-MCNC: 166 MG/DL
GLUCOSE SERPL-MCNC: 86 MG/DL
GLUCOSE SERPL-MCNC: 90 MG/DL
GLUCOSE SERPL-MCNC: 98 MG/DL
HBA1C MFR BLD HPLC: 5.9 %
HBA1C MFR BLD HPLC: 6.6 %
HCT VFR BLD CALC: 35.1 %
HCT VFR BLD CALC: 37.9 %
HCT VFR BLD CALC: 38.6 %
HCT VFR BLD CALC: 44.6 %
HDLC SERPL-MCNC: 37 MG/DL
HGB BLD-MCNC: 11.6 G/DL
HGB BLD-MCNC: 12 G/DL
HGB BLD-MCNC: 12.2 G/DL
HGB BLD-MCNC: 14.7 G/DL
HYALINE CASTS: 0 /LPF
HYALINE CASTS: 0 /LPF
HYALINE CASTS: 2 /LPF
IMM GRANULOCYTES NFR BLD AUTO: 0.1 %
IMM GRANULOCYTES NFR BLD AUTO: 0.2 %
IMM GRANULOCYTES NFR BLD AUTO: 0.4 %
IRON SATN MFR SERPL: 15 %
IRON SERPL-MCNC: 33 UG/DL
KETONES URINE: NEGATIVE
KETONES URINE: NEGATIVE
KETONES URINE: NORMAL
LDLC SERPL CALC-MCNC: 56 MG/DL
LEUKOCYTE ESTERASE URINE: NEGATIVE
LPL SERPL-CCNC: 15 U/L
LYMPHOCYTES # BLD AUTO: 1.24 K/UL
LYMPHOCYTES # BLD AUTO: 1.29 K/UL
LYMPHOCYTES # BLD AUTO: 1.42 K/UL
LYMPHOCYTES # BLD AUTO: 1.58 K/UL
LYMPHOCYTES NFR BLD AUTO: 15 %
LYMPHOCYTES NFR BLD AUTO: 18.2 %
LYMPHOCYTES NFR BLD AUTO: 25.9 %
LYMPHOCYTES NFR BLD AUTO: 29.4 %
MAGNESIUM SERPL-MCNC: 1.6 MG/DL
MAN DIFF?: NORMAL
MCHC RBC-ENTMCNC: 30.2 PG
MCHC RBC-ENTMCNC: 31.1 GM/DL
MCHC RBC-ENTMCNC: 32.2 GM/DL
MCHC RBC-ENTMCNC: 33 GM/DL
MCHC RBC-ENTMCNC: 33 GM/DL
MCHC RBC-ENTMCNC: 33.2 PG
MCHC RBC-ENTMCNC: 33.7 PG
MCHC RBC-ENTMCNC: 33.8 PG
MCV RBC AUTO: 102.3 FL
MCV RBC AUTO: 103.3 FL
MCV RBC AUTO: 108.4 FL
MCV RBC AUTO: 91.8 FL
MICROALBUMIN 24H UR DL<=1MG/L-MCNC: 1.5 MG/DL
MICROALBUMIN/CREAT 24H UR-RTO: 6 MG/G
MICROSCOPIC-UA: NORMAL
MONOCYTES # BLD AUTO: 0.34 K/UL
MONOCYTES # BLD AUTO: 0.47 K/UL
MONOCYTES # BLD AUTO: 0.5 K/UL
MONOCYTES # BLD AUTO: 0.85 K/UL
MONOCYTES NFR BLD AUTO: 7.4 %
MONOCYTES NFR BLD AUTO: 7.7 %
MONOCYTES NFR BLD AUTO: 8.1 %
MONOCYTES NFR BLD AUTO: 8.6 %
NEUTROPHILS # BLD AUTO: 2.29 K/UL
NEUTROPHILS # BLD AUTO: 2.83 K/UL
NEUTROPHILS # BLD AUTO: 2.91 K/UL
NEUTROPHILS # BLD AUTO: 5.49 K/UL
NEUTROPHILS NFR BLD AUTO: 42.9 %
NEUTROPHILS NFR BLD AUTO: 51.7 %
NEUTROPHILS NFR BLD AUTO: 51.9 %
NEUTROPHILS NFR BLD AUTO: 52.2 %
NITRITE URINE: NEGATIVE
NONHDLC SERPL-MCNC: 80 MG/DL
PH URINE: 6
PH URINE: 6.5
PH URINE: 7
PLATELET # BLD AUTO: 101 K/UL
PLATELET # BLD AUTO: 101 K/UL
PLATELET # BLD AUTO: 103 K/UL
PLATELET # BLD AUTO: 298 K/UL
PLATELET # PLAS AUTO: 106 K/UL
POTASSIUM SERPL-SCNC: 3.9 MMOL/L
POTASSIUM SERPL-SCNC: 4.2 MMOL/L
POTASSIUM SERPL-SCNC: 4.3 MMOL/L
POTASSIUM SERPL-SCNC: 4.4 MMOL/L
POTASSIUM SERPL-SCNC: 4.6 MMOL/L
POTASSIUM SERPL-SCNC: 4.7 MMOL/L
PROT SERPL-MCNC: 7.4 G/DL
PROTEIN URINE: ABNORMAL
PROTEIN URINE: NORMAL
PROTEIN URINE: NORMAL
PSA SERPL-MCNC: 0.5 NG/ML
RBC # BLD: 3.43 M/UL
RBC # BLD: 3.56 M/UL
RBC # BLD: 3.67 M/UL
RBC # BLD: 4.86 M/UL
RBC # FLD: 13.6 %
RBC # FLD: 14.4 %
RBC # FLD: 14.5 %
RBC # FLD: 14.6 %
RED BLOOD CELLS URINE: 1 /HPF
RED BLOOD CELLS URINE: 2 /HPF
RED BLOOD CELLS URINE: 5 /HPF
SODIUM SERPL-SCNC: 133 MMOL/L
SODIUM SERPL-SCNC: 133 MMOL/L
SODIUM SERPL-SCNC: 136 MMOL/L
SODIUM SERPL-SCNC: 138 MMOL/L
SODIUM SERPL-SCNC: 139 MMOL/L
SODIUM SERPL-SCNC: 142 MMOL/L
SPECIFIC GRAVITY URINE: 1.02
SPECIFIC GRAVITY URINE: 1.02
SPECIFIC GRAVITY URINE: 1.03
SQUAMOUS EPITHELIAL CELLS: 1 /HPF
T4 FREE SERPL-MCNC: 0.7 NG/DL
T4 FREE SERPL-MCNC: 1.1 NG/DL
TIBC SERPL-MCNC: 216 UG/DL
TRIGL SERPL-MCNC: 119 MG/DL
TSH SERPL-ACNC: 2.21 UIU/ML
TSH SERPL-ACNC: 2.24 UIU/ML
UIBC SERPL-MCNC: 183 UG/DL
URINE COMMENTS: NORMAL
UROBILINOGEN URINE: ABNORMAL
UROBILINOGEN URINE: ABNORMAL
UROBILINOGEN URINE: NORMAL
VIT B12 SERPL-MCNC: 1027 PG/ML
WBC # FLD AUTO: 10.55 K/UL
WBC # FLD AUTO: 4.39 K/UL
WBC # FLD AUTO: 5.47 K/UL
WBC # FLD AUTO: 6.8 K/UL
WHITE BLOOD CELLS URINE: 1 /HPF
WHITE BLOOD CELLS URINE: 2 /HPF
WHITE BLOOD CELLS URINE: 2 /HPF

## 2024-05-08 NOTE — PROGRESS NOTE ADULT - NS ATTEND OPT1A GEN_ALL_CORE
show
History/Exam/Medical decision making

## 2024-05-21 NOTE — PROGRESS NOTE ADULT - SUBJECTIVE AND OBJECTIVE BOX
HPI:  66M hx HTN/DM was in the [place] for vacation early January, was confused and not answering appropriately, MRI Jan 7 showed L frontal enhancing mass with significant edema. Repeat CTH showed significant vasogenic edema and 1.5cm MLS     EVENTS:   No acute events overnight.    VITALS:  T(C): , Max: 36.7 (01-25-22 @ 23:00)  HR:  (53 - 81)  BP:  (102/65 - 130/88)  ABP: --  RR:  (14 - 20)  SpO2:  (93% - 97%)  Wt(kg): --      01-25-22 @ 07:01  -  01-26-22 @ 07:00  --------------------------------------------------------  IN: 2600 mL / OUT: 1275 mL / NET: 1325 mL    01-26-22 @ 07:01  -  01-26-22 @ 18:49  --------------------------------------------------------  IN: 1530 mL / OUT: 1050 mL / NET: 480 mL      LABS:  Na: 137 (01-26 @ 16:18), 139 (01-26 @ 09:30), 140 (01-26 @ 03:22), 134 (01-25 @ 21:12), 135 (01-25 @ 15:33), 136 (01-25 @ 10:13), 136 (01-24 @ 19:30)  K: 4.6 (01-26 @ 16:18), 4.1 (01-26 @ 09:30), 4.0 (01-26 @ 03:22), 4.0 (01-25 @ 21:12), 4.1 (01-25 @ 15:33), 4.5 (01-25 @ 10:13), 3.8 (01-24 @ 19:30)  Cl: 105 (01-26 @ 16:18), 106 (01-26 @ 09:30), 107 (01-26 @ 03:22), 104 (01-25 @ 21:12), 101 (01-25 @ 15:33), 100 (01-25 @ 10:13), 98 (01-24 @ 19:30)  CO2: 16 (01-26 @ 16:18), 19 (01-26 @ 09:30), 20 (01-26 @ 03:22), 20 (01-25 @ 21:12), 22 (01-25 @ 15:33), 23 (01-25 @ 10:13), 24 (01-24 @ 19:30)  BUN: 19 (01-26 @ 16:18), 15 (01-26 @ 09:30), 15 (01-26 @ 03:22), 16 (01-25 @ 21:12), 15 (01-25 @ 15:33), 12 (01-25 @ 10:13), 15 (01-24 @ 19:30)  Cr: 0.87 (01-26 @ 16:18), 0.80 (01-26 @ 09:30), 0.80 (01-26 @ 03:22), 0.89 (01-25 @ 21:12), 0.93 (01-25 @ 15:33), 0.78 (01-25 @ 10:13), 0.90 (01-24 @ 19:30)  Glu: 349(01-26 @ 16:18), 286(01-26 @ 09:30), 205(01-26 @ 03:22), 248(01-25 @ 21:12), 315(01-25 @ 15:33), 286(01-25 @ 10:13), 176(01-24 @ 19:30)    Hgb: 15.7 (01-24 @ 19:30)  Hct: 48.8 (01-24 @ 19:30)  WBC: 9.23 (01-24 @ 19:30)  Plt: 140 (01-24 @ 19:30)    INR: 1.02 01-24-22 @ 19:30  PTT: 27.6 01-24-22 @ 19:30    IMAGING:   Recent imaging studies were reviewed.    MEDICATIONS:  acetaminophen     Tablet .. 650 milliGRAM(s) Oral every 6 hours PRN  atorvastatin 20 milliGRAM(s) Oral at bedtime  bisacodyl 5 milliGRAM(s) Oral daily PRN  chlorhexidine 4% Liquid 1 Application(s) Topical daily  dexAMETHasone     Tablet 4 milliGRAM(s) Oral every 6 hours  dextrose 40% Gel 15 Gram(s) Oral once  dextrose 5%. 1000 milliLiter(s) IV Continuous <Continuous>  dextrose 5%. 1000 milliLiter(s) IV Continuous <Continuous>  dextrose 50% Injectable 25 Gram(s) IV Push once  dextrose 50% Injectable 12.5 Gram(s) IV Push once  dextrose 50% Injectable 25 Gram(s) IV Push once  glucagon  Injectable 1 milliGRAM(s) IntraMuscular once  insulin glargine Injectable (LANTUS) 10 Unit(s) SubCutaneous at bedtime  insulin lispro (ADMELOG) corrective regimen sliding scale   SubCutaneous Before meals and at bedtime  insulin lispro Injectable (ADMELOG) 12 Unit(s) SubCutaneous three times a day before meals  lacosamide 100 milliGRAM(s) Oral two times a day  lisinopril 2.5 milliGRAM(s) Oral daily  pantoprazole    Tablet 40 milliGRAM(s) Oral before breakfast  polyethylene glycol 3350 17 Gram(s) Oral every 12 hours  senna 2 Tablet(s) Oral at bedtime  sodium chloride 2% . 1000 milliLiter(s) IV Continuous <Continuous>    PHYSICAL EXAM:    General: calm  CVS: RRR  Pulm: CTAB  GI: Soft, NTND  Extremities: No LE Edema  Neuro: more awake today, Ox2 (person and place), perseverating, PERRL, EOMI, R facial, motor 5/5 throughout   Rx sent to the pharmacy.   HPI:  66M hx HTN/DM was [place] early January, was confused and not answering appropriately, MRI Jan 7 showed L frontal enhancing mass with significant edema. Repeat CTH showed significant vasogenic edema and 1.5cm MLS    1/26 MRI done     VITALS:  T(C): , Max: 36.7 (01-25-22 @ 23:00)  HR:  (53 - 81)  BP:  (102/65 - 130/88)  ABP: --  RR:  (14 - 20)  SpO2:  (93% - 97%)  Wt(kg): --      01-25-22 @ 07:01  -  01-26-22 @ 07:00  --------------------------------------------------------  IN: 2600 mL / OUT: 1275 mL / NET: 1325 mL    01-26-22 @ 07:01  -  01-26-22 @ 18:49  --------------------------------------------------------  IN: 1530 mL / OUT: 1050 mL / NET: 480 mL      LABS:  Na: 137 (01-26 @ 16:18), 139 (01-26 @ 09:30), 140 (01-26 @ 03:22), 134 (01-25 @ 21:12), 135 (01-25 @ 15:33), 136 (01-25 @ 10:13), 136 (01-24 @ 19:30)  K: 4.6 (01-26 @ 16:18), 4.1 (01-26 @ 09:30), 4.0 (01-26 @ 03:22), 4.0 (01-25 @ 21:12), 4.1 (01-25 @ 15:33), 4.5 (01-25 @ 10:13), 3.8 (01-24 @ 19:30)  Cl: 105 (01-26 @ 16:18), 106 (01-26 @ 09:30), 107 (01-26 @ 03:22), 104 (01-25 @ 21:12), 101 (01-25 @ 15:33), 100 (01-25 @ 10:13), 98 (01-24 @ 19:30)  CO2: 16 (01-26 @ 16:18), 19 (01-26 @ 09:30), 20 (01-26 @ 03:22), 20 (01-25 @ 21:12), 22 (01-25 @ 15:33), 23 (01-25 @ 10:13), 24 (01-24 @ 19:30)  BUN: 19 (01-26 @ 16:18), 15 (01-26 @ 09:30), 15 (01-26 @ 03:22), 16 (01-25 @ 21:12), 15 (01-25 @ 15:33), 12 (01-25 @ 10:13), 15 (01-24 @ 19:30)  Cr: 0.87 (01-26 @ 16:18), 0.80 (01-26 @ 09:30), 0.80 (01-26 @ 03:22), 0.89 (01-25 @ 21:12), 0.93 (01-25 @ 15:33), 0.78 (01-25 @ 10:13), 0.90 (01-24 @ 19:30)  Glu: 349(01-26 @ 16:18), 286(01-26 @ 09:30), 205(01-26 @ 03:22), 248(01-25 @ 21:12), 315(01-25 @ 15:33), 286(01-25 @ 10:13), 176(01-24 @ 19:30)    Hgb: 15.7 (01-24 @ 19:30)  Hct: 48.8 (01-24 @ 19:30)  WBC: 9.23 (01-24 @ 19:30)  Plt: 140 (01-24 @ 19:30)    INR: 1.02 01-24-22 @ 19:30  PTT: 27.6 01-24-22 @ 19:30    IMAGING:   Recent imaging studies were reviewed.    MEDICATIONS:  acetaminophen     Tablet .. 650 milliGRAM(s) Oral every 6 hours PRN  atorvastatin 20 milliGRAM(s) Oral at bedtime  bisacodyl 5 milliGRAM(s) Oral daily PRN  chlorhexidine 4% Liquid 1 Application(s) Topical daily  dexAMETHasone     Tablet 4 milliGRAM(s) Oral every 6 hours  dextrose 40% Gel 15 Gram(s) Oral once  dextrose 5%. 1000 milliLiter(s) IV Continuous <Continuous>  dextrose 5%. 1000 milliLiter(s) IV Continuous <Continuous>  dextrose 50% Injectable 25 Gram(s) IV Push once  dextrose 50% Injectable 12.5 Gram(s) IV Push once  dextrose 50% Injectable 25 Gram(s) IV Push once  glucagon  Injectable 1 milliGRAM(s) IntraMuscular once  insulin glargine Injectable (LANTUS) 10 Unit(s) SubCutaneous at bedtime  insulin lispro (ADMELOG) corrective regimen sliding scale   SubCutaneous Before meals and at bedtime  insulin lispro Injectable (ADMELOG) 12 Unit(s) SubCutaneous three times a day before meals  lacosamide 100 milliGRAM(s) Oral two times a day  lisinopril 2.5 milliGRAM(s) Oral daily  pantoprazole    Tablet 40 milliGRAM(s) Oral before breakfast  polyethylene glycol 3350 17 Gram(s) Oral every 12 hours  senna 2 Tablet(s) Oral at bedtime  sodium chloride 2% . 1000 milliLiter(s) IV Continuous <Continuous>    PHYSICAL EXAM:    General: calm  CVS: RRR  Pulm: CTAB  GI: Soft, NTND  Extremities: No LE Edema  Neuro: more awake today, Ox3, fluent speech in French, PERRL, EOMI, mild R facial, motor 5/5 throughout, RUE pronator drift

## 2024-07-12 NOTE — PROGRESS NOTE ADULT - ASSESSMENT
67 yo M with PMH of Hypertension, Type 2 Diabetes Mellitus, Hyperlipidemia Anemia, tracheal stenosis s/p tracheostomy, glioblastoma who presented to [facility] 3/7 for cerebral angiogram with Avastin treatment. Course complicated by metabolic acidosis, acute hypoxic respiratory failure requiring mechanical ventilation, takotsubo cardiomyopathy, A-fib, chronic DVT, dysphagia. Weaned off vent. Now admitted to [facility] for rehab.    # Glioblastoma   - PT/OT/ST per PMR  - Pain management per PMR  - Keppra 750mg BID    # Stress induced cardiomyopathy (Takotsubo Cardiomyopathy)  - EF improved per echo at [facility] - most recently 35-40%  - euvolemic on exam - continue lasix prn based on volume statis  - GDMT - continue entrsto 24/26mg bid, Toprol 50mg daily with holding parameters     # Tracheal stenosis s/p trach  - ENT eval noted  - trach care    # Hyponatremia   - Na 130 on recent labs  - on NaCl tab 2g q6h. plan to taper down as tolerated gradually and cautiously given low EF    # Type 2 Diabetes Mellitus  - HbA1C 5.8%  - Hold home oral med - Januvia  - FS and JANIE    # DVT  - chronic   - Lovenox BID    DVT Prophylaxis:  - Lovenox  No

## 2024-11-06 NOTE — REVIEW OF SYSTEMS
Please see refill request, has a f/u on 11/20 [As Noted in HPI] : as noted in HPI [Negative] : Heme/Lymph

## 2024-11-23 NOTE — DISCHARGE NOTE NURSING/CASE MANAGEMENT/SOCIAL WORK - NSDCPNDISPN_GEN_ALL_CORE
yes
Education provided on the pain management plan of care/Side effects of pain management treatment/Activities of daily living, including home environment that might     exacerbate pain or reduce effectiveness of the pain management plan of care as well as strategies to address these issues/Safe use, storage and disposal of opioids when prescribed

## 2024-12-23 NOTE — DATA REVIEWED
hide
[FreeTextEntry1] : 5/16/2023  Venous Doppler Elliot LE  no acute dvt svt \par

## 2025-04-14 NOTE — SWALLOW BEDSIDE ASSESSMENT ADULT - SWALLOW EVAL: ORAL MUSCULATURE
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

## 2025-05-30 NOTE — DISCHARGE NOTE PROVIDER - DISCHARGE SERVICE FOR PATIENT
Pt to ED with c/o SOB  and audible since yesterday. Audible wheezing noted. Last albuterol inhaler use PTA.    
on the discharge service for the patient. I have reviewed and made amendments to the documentation where necessary.

## (undated) DEVICE — WARMING BLANKET LOWER ADULT

## (undated) DEVICE — SPONGE PEANUT AUTO COUNT

## (undated) DEVICE — SUT SILK 2-0 12-18"

## (undated) DEVICE — ADAPTER FIBEROPTIC BRONCHOSCOPE DUAL AXIS SWIVEL

## (undated) DEVICE — PACK MINOR WITH LAP

## (undated) DEVICE — DURABLE MEDICAL EQUIPMENT: Type: DURABLE MEDICAL EQUIPMENT

## (undated) DEVICE — MARKING PEN W RULER

## (undated) DEVICE — WARMING BLANKET FULL ADULT

## (undated) DEVICE — SOL INJ LR 1000ML

## (undated) DEVICE — CHEST DRAIN OASIS DRY SUCTION WATER SEAL

## (undated) DEVICE — TUBING SUCTION NONCONDUCTIVE 6MM X 12FT

## (undated) DEVICE — VALVE SUCTION EVIS 160/200/240

## (undated) DEVICE — DRAPE TOWEL BLUE STICKY

## (undated) DEVICE — SUT SILK 2-0 30" SH

## (undated) DEVICE — PACK UPPER BODY

## (undated) DEVICE — VALVE BIOPSY BRONCHOVIDEOSCOPE

## (undated) DEVICE — TRAP SPECIMEN SPUTUM 40CC

## (undated) DEVICE — SUT SILK 2-0 30" PSL

## (undated) DEVICE — POLISHER OR CAUTERY TIP

## (undated) DEVICE — POSITIONER STRAP ARMBOARD VELCRO TS-30

## (undated) DEVICE — TUBING SUCTION 20FT

## (undated) DEVICE — LUBRICATING JELLY ONESHOT 1.25OZ

## (undated) DEVICE — HOLDER TRACH ADULT VELCRO 1IN

## (undated) DEVICE — VENODYNE/SCD SLEEVE CALF MEDIUM

## (undated) DEVICE — SYR SLIP 10CC

## (undated) DEVICE — DRAPE 3/4 SHEET 52X76"

## (undated) DEVICE — GLV 5.5 PROTEXIS (WHITE)

## (undated) DEVICE — DRAPE LARGE SHEET 72X85"

## (undated) DEVICE — ELCTR BOVIE TIP BLADE INSULATED 2.75" EDGE

## (undated) DEVICE — FORCEP BIOPSY 1.8MM JAW X 100CM DISP

## (undated) DEVICE — DRAPE MAGNETIC INSTRUMENT MEDIUM

## (undated) DEVICE — DRSG CURITY GAUZE SPONGE 4 X 4" 12-PLY

## (undated) DEVICE — ELCTR GROUNDING PAD ADULT COVIDIEN

## (undated) DEVICE — DRAPE THYROID 77" X 123"

## (undated) DEVICE — DRSG TELFA 3 X 8

## (undated) DEVICE — GLV 7.5 PROTEXIS (WHITE)

## (undated) DEVICE — FORCEP RADIAL JAW 4 PULMONARY

## (undated) DEVICE — POSITIONER PATIENT SAFETY STRAP 3X60"